# Patient Record
Sex: FEMALE | Race: WHITE
[De-identification: names, ages, dates, MRNs, and addresses within clinical notes are randomized per-mention and may not be internally consistent; named-entity substitution may affect disease eponyms.]

---

## 2017-02-02 ENCOUNTER — RECORD ABSTRACTING (OUTPATIENT)
Age: 32
End: 2017-02-02

## 2017-02-02 DIAGNOSIS — F15.90 OTHER STIMULANT USE, UNSPECIFIED, UNCOMPLICATED: ICD-10-CM

## 2017-02-17 ENCOUNTER — APPOINTMENT (OUTPATIENT)
Dept: ENDOCRINOLOGY | Facility: CLINIC | Age: 32
End: 2017-02-17

## 2017-03-22 ENCOUNTER — APPOINTMENT (OUTPATIENT)
Dept: OTOLARYNGOLOGY | Facility: CLINIC | Age: 32
End: 2017-03-22

## 2017-04-12 ENCOUNTER — APPOINTMENT (OUTPATIENT)
Dept: OTOLARYNGOLOGY | Facility: CLINIC | Age: 32
End: 2017-04-12

## 2017-04-20 ENCOUNTER — APPOINTMENT (OUTPATIENT)
Dept: OBGYN | Facility: CLINIC | Age: 32
End: 2017-04-20

## 2017-04-20 VITALS
HEIGHT: 64 IN | DIASTOLIC BLOOD PRESSURE: 90 MMHG | WEIGHT: 164 LBS | SYSTOLIC BLOOD PRESSURE: 140 MMHG | BODY MASS INDEX: 28 KG/M2

## 2017-04-24 LAB
APPEARANCE UR: CLEAR
BACTERIA UR CULT: NORMAL
BILIRUB UR QL STRIP: NEGATIVE
COLOR UR: YELLOW
GLUCOSE UR STRIP-MCNC: NEGATIVE MG/DL
HGB UR QL STRIP: NEGATIVE
KETONES UR STRIP-MCNC: NEGATIVE MG/DL
NITRITE UR QL STRIP: NEGATIVE
PH UR STRIP: 7.5
PROT UR STRIP-MCNC: NEGATIVE MG/DL
SP GR UR STRIP: 1.02
UROBILINOGEN UR STRIP-MCNC: 0.2 MG/DL
WBC URNS QL MICRO: NEGATIVE

## 2017-04-25 ENCOUNTER — RESULT REVIEW (OUTPATIENT)
Age: 32
End: 2017-04-25

## 2017-04-27 LAB
A VAGINAE DNA VAG NAA+PROBE-LOG#: NOT DETECTED
BV SCORE VAG QL NAA+PROBE: ABNORMAL
C GLABRATA DNA VAG QL NAA+PROBE: NOT DETECTED
C PARAP DNA VAG QL NAA+PROBE: NOT DETECTED
C TRACH RRNA SPEC QL NAA+PROBE: NOT DETECTED
C TROPICLS DNA VAG QL NAA+PROBE: NOT DETECTED
CANDIDA DNA VAG QL NAA+PROBE: NOT DETECTED
G VAGINALIS DNA VAG NAA+PROBE-LOG#: 7.1
LACTOBACILLUS DNA VAG NAA+PROBE-LOG#: 6.2
MEGASPHAERA SP DNA VAG NAA+PROBE-LOG#: NOT DETECTED
N GONORRHOEA RRNA SPEC QL NAA+PROBE: NOT DETECTED
T VAGINALIS RRNA SPEC QL NAA+PROBE: NOT DETECTED

## 2017-04-28 LAB
CHOLEST SERPL-MCNC: 159 MG/DL
HDLC SERPL-MCNC: 51 MG/DL
HDLC SERPL: 3.12
LDLC SERPL DIRECT ASSAY-MCNC: 94 MG/DL
TRIGL SERPL-MCNC: 75 MG/DL
VLDLC SERPL-MCNC: 15 MG/DL

## 2017-05-04 ENCOUNTER — APPOINTMENT (OUTPATIENT)
Dept: OBGYN | Facility: CLINIC | Age: 32
End: 2017-05-04

## 2017-05-04 VITALS
WEIGHT: 164 LBS | SYSTOLIC BLOOD PRESSURE: 134 MMHG | BODY MASS INDEX: 28 KG/M2 | DIASTOLIC BLOOD PRESSURE: 82 MMHG | HEIGHT: 64 IN

## 2017-05-10 ENCOUNTER — APPOINTMENT (OUTPATIENT)
Dept: OTOLARYNGOLOGY | Facility: CLINIC | Age: 32
End: 2017-05-10

## 2017-05-12 ENCOUNTER — OUTPATIENT (OUTPATIENT)
Dept: OUTPATIENT SERVICES | Facility: HOSPITAL | Age: 32
LOS: 1 days | Discharge: HOME | End: 2017-05-12

## 2017-05-12 ENCOUNTER — APPOINTMENT (OUTPATIENT)
Dept: ENDOCRINOLOGY | Facility: CLINIC | Age: 32
End: 2017-05-12

## 2017-05-12 VITALS
WEIGHT: 162 LBS | BODY MASS INDEX: 27.66 KG/M2 | DIASTOLIC BLOOD PRESSURE: 80 MMHG | HEART RATE: 106 BPM | SYSTOLIC BLOOD PRESSURE: 130 MMHG | HEIGHT: 64 IN

## 2017-05-18 ENCOUNTER — APPOINTMENT (OUTPATIENT)
Dept: OBGYN | Facility: CLINIC | Age: 32
End: 2017-05-18

## 2017-05-18 VITALS
BODY MASS INDEX: 28.34 KG/M2 | WEIGHT: 166 LBS | SYSTOLIC BLOOD PRESSURE: 116 MMHG | HEIGHT: 64 IN | DIASTOLIC BLOOD PRESSURE: 80 MMHG

## 2017-05-22 ENCOUNTER — OTHER (OUTPATIENT)
Age: 32
End: 2017-05-22

## 2017-05-26 ENCOUNTER — RECORD ABSTRACTING (OUTPATIENT)
Age: 32
End: 2017-05-26

## 2017-06-07 ENCOUNTER — APPOINTMENT (OUTPATIENT)
Dept: INTERNAL MEDICINE | Facility: CLINIC | Age: 32
End: 2017-06-07

## 2017-06-07 ENCOUNTER — RESULT REVIEW (OUTPATIENT)
Age: 32
End: 2017-06-07

## 2017-06-07 VITALS
WEIGHT: 166 LBS | BODY MASS INDEX: 28.34 KG/M2 | HEART RATE: 112 BPM | SYSTOLIC BLOOD PRESSURE: 131 MMHG | DIASTOLIC BLOOD PRESSURE: 83 MMHG | HEIGHT: 64 IN

## 2017-06-07 RX ORDER — PHENAZOPYRIDINE HCL 95 MG
95 TABLET ORAL 3 TIMES DAILY
Qty: 24 | Refills: 0 | Status: DISCONTINUED | COMMUNITY
Start: 2017-05-04 | End: 2017-06-07

## 2017-06-07 RX ORDER — DULOXETINE HYDROCHLORIDE 60 MG/1
60 CAPSULE, DELAYED RELEASE ORAL
Refills: 0 | Status: DISCONTINUED | COMMUNITY
End: 2017-06-07

## 2017-06-08 LAB
APPEARANCE UR: NORMAL
BACTERIA URNS QL MICRO: ABNORMAL
BILIRUB UR QL STRIP: NEGATIVE
COLOR UR: YELLOW
GLUCOSE UR STRIP-MCNC: 100 MG/DL
HGB UR QL STRIP: NEGATIVE
KETONES UR STRIP-MCNC: NEGATIVE MG/DL
NITRITE UR QL STRIP: NEGATIVE
PH UR STRIP: 6.5
PROT UR STRIP-MCNC: NEGATIVE MG/DL
SP GR UR STRIP: 1.02
URINE COMP/EPITH (NORTH): ABNORMAL
UROBILINOGEN UR STRIP-MCNC: 0.2 MG/DL
WBC URNS QL MICRO: ABNORMAL
WBC URNS QL MICRO: ABNORMAL P/HPF

## 2017-06-12 ENCOUNTER — TRANSCRIPTION ENCOUNTER (OUTPATIENT)
Age: 32
End: 2017-06-12

## 2017-06-12 LAB — BACTERIA UR CULT: NORMAL

## 2017-06-15 ENCOUNTER — APPOINTMENT (OUTPATIENT)
Dept: UROLOGY | Facility: CLINIC | Age: 32
End: 2017-06-15

## 2017-06-15 ENCOUNTER — OUTPATIENT (OUTPATIENT)
Dept: OUTPATIENT SERVICES | Facility: HOSPITAL | Age: 32
LOS: 1 days | Discharge: HOME | End: 2017-06-15

## 2017-06-15 VITALS — SYSTOLIC BLOOD PRESSURE: 130 MMHG | HEART RATE: 129 BPM | OXYGEN SATURATION: 99 % | DIASTOLIC BLOOD PRESSURE: 80 MMHG

## 2017-06-15 DIAGNOSIS — R25.1 TREMOR, UNSPECIFIED: ICD-10-CM

## 2017-06-15 DIAGNOSIS — E13.10 OTHER SPECIFIED DIABETES MELLITUS WITH KETOACIDOSIS WITHOUT COMA: ICD-10-CM

## 2017-06-15 RX ORDER — METRONIDAZOLE 500 MG/1
500 TABLET ORAL TWICE DAILY
Qty: 10 | Refills: 0 | Status: COMPLETED | COMMUNITY
Start: 2017-04-27 | End: 2017-06-15

## 2017-06-19 LAB
APPEARANCE UR: CLEAR
BILIRUB UR QL STRIP: NEGATIVE
COLOR UR: YELLOW
GLUCOSE UR STRIP-MCNC: 100 MG/DL
HGB UR QL STRIP: NEGATIVE
KETONES UR STRIP-MCNC: NEGATIVE MG/DL
NITRITE UR QL STRIP: NEGATIVE
PH UR STRIP: 6.5
PROT UR STRIP-MCNC: NEGATIVE MG/DL
SP GR UR STRIP: 1.01
UROBILINOGEN UR STRIP-MCNC: 0.2 MG/DL
WBC URNS QL MICRO: NEGATIVE

## 2017-06-21 ENCOUNTER — APPOINTMENT (OUTPATIENT)
Dept: INTERNAL MEDICINE | Facility: CLINIC | Age: 32
End: 2017-06-21

## 2017-06-21 ENCOUNTER — OUTPATIENT (OUTPATIENT)
Dept: OUTPATIENT SERVICES | Facility: HOSPITAL | Age: 32
LOS: 1 days | Discharge: HOME | End: 2017-06-21

## 2017-06-21 VITALS
HEART RATE: 121 BPM | HEIGHT: 64 IN | WEIGHT: 166 LBS | BODY MASS INDEX: 28.34 KG/M2 | SYSTOLIC BLOOD PRESSURE: 139 MMHG | DIASTOLIC BLOOD PRESSURE: 89 MMHG

## 2017-06-21 DIAGNOSIS — R39.9 UNSPECIFIED SYMPTOMS AND SIGNS INVOLVING THE GENITOURINARY SYSTEM: ICD-10-CM

## 2017-06-21 DIAGNOSIS — R25.1 TREMOR, UNSPECIFIED: ICD-10-CM

## 2017-06-21 DIAGNOSIS — E13.10 OTHER SPECIFIED DIABETES MELLITUS WITH KETOACIDOSIS WITHOUT COMA: ICD-10-CM

## 2017-06-28 DIAGNOSIS — L70.9 ACNE, UNSPECIFIED: ICD-10-CM

## 2017-06-28 DIAGNOSIS — R10.2 PELVIC AND PERINEAL PAIN: ICD-10-CM

## 2017-06-28 DIAGNOSIS — E10.9 TYPE 1 DIABETES MELLITUS WITHOUT COMPLICATIONS: ICD-10-CM

## 2017-06-29 ENCOUNTER — TRANSCRIPTION ENCOUNTER (OUTPATIENT)
Age: 32
End: 2017-06-29

## 2017-07-11 ENCOUNTER — TRANSCRIPTION ENCOUNTER (OUTPATIENT)
Age: 32
End: 2017-07-11

## 2017-07-24 ENCOUNTER — RX RENEWAL (OUTPATIENT)
Age: 32
End: 2017-07-24

## 2017-07-28 ENCOUNTER — OUTPATIENT (OUTPATIENT)
Dept: OUTPATIENT SERVICES | Facility: HOSPITAL | Age: 32
LOS: 1 days | Discharge: HOME | End: 2017-07-28

## 2017-07-28 DIAGNOSIS — E13.10 OTHER SPECIFIED DIABETES MELLITUS WITH KETOACIDOSIS WITHOUT COMA: ICD-10-CM

## 2017-07-28 DIAGNOSIS — R25.1 TREMOR, UNSPECIFIED: ICD-10-CM

## 2017-07-28 DIAGNOSIS — E11.9 TYPE 2 DIABETES MELLITUS WITHOUT COMPLICATIONS: ICD-10-CM

## 2017-08-03 ENCOUNTER — MED ADMIN CHARGE (OUTPATIENT)
Age: 32
End: 2017-08-03

## 2017-08-03 RX ORDER — LANCETS 28 GAUGE
EACH MISCELLANEOUS
Qty: 300 | Refills: 5 | Status: COMPLETED | COMMUNITY
Start: 2017-08-03

## 2017-08-04 ENCOUNTER — OTHER (OUTPATIENT)
Age: 32
End: 2017-08-04

## 2017-08-09 ENCOUNTER — APPOINTMENT (OUTPATIENT)
Dept: OTOLARYNGOLOGY | Facility: CLINIC | Age: 32
End: 2017-08-09
Payer: MEDICAID

## 2017-08-09 VITALS — WEIGHT: 166 LBS | HEIGHT: 64 IN | BODY MASS INDEX: 28.34 KG/M2

## 2017-08-09 PROCEDURE — 99213 OFFICE O/P EST LOW 20 MIN: CPT | Mod: 25

## 2017-08-09 PROCEDURE — 31575 DIAGNOSTIC LARYNGOSCOPY: CPT

## 2017-08-11 ENCOUNTER — APPOINTMENT (OUTPATIENT)
Dept: ENDOCRINOLOGY | Facility: CLINIC | Age: 32
End: 2017-08-11

## 2017-08-11 ENCOUNTER — OUTPATIENT (OUTPATIENT)
Dept: OUTPATIENT SERVICES | Facility: HOSPITAL | Age: 32
LOS: 1 days | Discharge: HOME | End: 2017-08-11

## 2017-08-11 VITALS — HEART RATE: 100 BPM | SYSTOLIC BLOOD PRESSURE: 136 MMHG | DIASTOLIC BLOOD PRESSURE: 80 MMHG

## 2017-08-11 VITALS — BODY MASS INDEX: 28.51 KG/M2 | HEIGHT: 64 IN | WEIGHT: 167 LBS

## 2017-08-11 DIAGNOSIS — E11.42 TYPE 2 DIABETES MELLITUS WITH DIABETIC POLYNEUROPATHY: ICD-10-CM

## 2017-08-11 DIAGNOSIS — E13.10 OTHER SPECIFIED DIABETES MELLITUS WITH KETOACIDOSIS WITHOUT COMA: ICD-10-CM

## 2017-08-11 DIAGNOSIS — E10.9 TYPE 1 DIABETES MELLITUS WITHOUT COMPLICATIONS: ICD-10-CM

## 2017-08-11 DIAGNOSIS — Z02.9 ENCOUNTER FOR ADMINISTRATIVE EXAMINATIONS, UNSPECIFIED: ICD-10-CM

## 2017-08-11 DIAGNOSIS — R25.1 TREMOR, UNSPECIFIED: ICD-10-CM

## 2017-08-21 ENCOUNTER — EMERGENCY (EMERGENCY)
Facility: HOSPITAL | Age: 32
LOS: 0 days | Discharge: HOME | End: 2017-08-21
Admitting: INTERNAL MEDICINE

## 2017-08-21 DIAGNOSIS — K21.9 GASTRO-ESOPHAGEAL REFLUX DISEASE WITHOUT ESOPHAGITIS: ICD-10-CM

## 2017-08-21 DIAGNOSIS — E10.65 TYPE 1 DIABETES MELLITUS WITH HYPERGLYCEMIA: ICD-10-CM

## 2017-08-21 DIAGNOSIS — J45.909 UNSPECIFIED ASTHMA, UNCOMPLICATED: ICD-10-CM

## 2017-08-21 DIAGNOSIS — Z88.8 ALLERGY STATUS TO OTHER DRUGS, MEDICAMENTS AND BIOLOGICAL SUBSTANCES: ICD-10-CM

## 2017-08-21 DIAGNOSIS — R25.1 TREMOR, UNSPECIFIED: ICD-10-CM

## 2017-08-21 DIAGNOSIS — Z88.7 ALLERGY STATUS TO SERUM AND VACCINE: ICD-10-CM

## 2017-08-21 DIAGNOSIS — Z79.4 LONG TERM (CURRENT) USE OF INSULIN: ICD-10-CM

## 2017-08-21 DIAGNOSIS — Z79.899 OTHER LONG TERM (CURRENT) DRUG THERAPY: ICD-10-CM

## 2017-08-21 DIAGNOSIS — Z88.0 ALLERGY STATUS TO PENICILLIN: ICD-10-CM

## 2017-08-21 DIAGNOSIS — Z88.1 ALLERGY STATUS TO OTHER ANTIBIOTIC AGENTS STATUS: ICD-10-CM

## 2017-08-21 DIAGNOSIS — E13.10 OTHER SPECIFIED DIABETES MELLITUS WITH KETOACIDOSIS WITHOUT COMA: ICD-10-CM

## 2017-08-30 ENCOUNTER — APPOINTMENT (OUTPATIENT)
Dept: PODIATRY | Facility: CLINIC | Age: 32
End: 2017-08-30

## 2017-08-30 ENCOUNTER — EMERGENCY (EMERGENCY)
Facility: HOSPITAL | Age: 32
LOS: 0 days | Discharge: HOME | End: 2017-08-31

## 2017-08-30 ENCOUNTER — TRANSCRIPTION ENCOUNTER (OUTPATIENT)
Age: 32
End: 2017-08-30

## 2017-08-30 DIAGNOSIS — E13.10 OTHER SPECIFIED DIABETES MELLITUS WITH KETOACIDOSIS WITHOUT COMA: ICD-10-CM

## 2017-08-30 DIAGNOSIS — Z88.8 ALLERGY STATUS TO OTHER DRUGS, MEDICAMENTS AND BIOLOGICAL SUBSTANCES: ICD-10-CM

## 2017-08-30 DIAGNOSIS — Z88.7 ALLERGY STATUS TO SERUM AND VACCINE: ICD-10-CM

## 2017-08-30 DIAGNOSIS — Z88.0 ALLERGY STATUS TO PENICILLIN: ICD-10-CM

## 2017-08-30 DIAGNOSIS — J45.909 UNSPECIFIED ASTHMA, UNCOMPLICATED: ICD-10-CM

## 2017-08-30 DIAGNOSIS — K21.9 GASTRO-ESOPHAGEAL REFLUX DISEASE WITHOUT ESOPHAGITIS: ICD-10-CM

## 2017-08-30 DIAGNOSIS — E11.65 TYPE 2 DIABETES MELLITUS WITH HYPERGLYCEMIA: ICD-10-CM

## 2017-08-30 DIAGNOSIS — Z79.4 LONG TERM (CURRENT) USE OF INSULIN: ICD-10-CM

## 2017-08-30 DIAGNOSIS — Z79.899 OTHER LONG TERM (CURRENT) DRUG THERAPY: ICD-10-CM

## 2017-08-30 DIAGNOSIS — R25.1 TREMOR, UNSPECIFIED: ICD-10-CM

## 2017-08-30 DIAGNOSIS — Z88.1 ALLERGY STATUS TO OTHER ANTIBIOTIC AGENTS STATUS: ICD-10-CM

## 2017-09-01 ENCOUNTER — APPOINTMENT (OUTPATIENT)
Dept: ENDOCRINOLOGY | Facility: CLINIC | Age: 32
End: 2017-09-01

## 2017-09-01 ENCOUNTER — OUTPATIENT (OUTPATIENT)
Dept: OUTPATIENT SERVICES | Facility: HOSPITAL | Age: 32
LOS: 1 days | Discharge: HOME | End: 2017-09-01

## 2017-09-01 VITALS
HEIGHT: 64 IN | WEIGHT: 165 LBS | BODY MASS INDEX: 28.17 KG/M2 | SYSTOLIC BLOOD PRESSURE: 130 MMHG | DIASTOLIC BLOOD PRESSURE: 80 MMHG | HEART RATE: 95 BPM

## 2017-09-01 DIAGNOSIS — Z02.9 ENCOUNTER FOR ADMINISTRATIVE EXAMINATIONS, UNSPECIFIED: ICD-10-CM

## 2017-09-01 DIAGNOSIS — R25.1 TREMOR, UNSPECIFIED: ICD-10-CM

## 2017-09-01 DIAGNOSIS — E13.10 OTHER SPECIFIED DIABETES MELLITUS WITH KETOACIDOSIS WITHOUT COMA: ICD-10-CM

## 2017-09-06 ENCOUNTER — APPOINTMENT (OUTPATIENT)
Dept: OTOLARYNGOLOGY | Facility: CLINIC | Age: 32
End: 2017-09-06
Payer: MEDICAID

## 2017-09-06 VITALS — WEIGHT: 165 LBS | BODY MASS INDEX: 28.17 KG/M2 | HEIGHT: 64 IN

## 2017-09-06 PROCEDURE — 99213 OFFICE O/P EST LOW 20 MIN: CPT | Mod: 25

## 2017-09-15 ENCOUNTER — OUTPATIENT (OUTPATIENT)
Dept: OUTPATIENT SERVICES | Facility: HOSPITAL | Age: 32
LOS: 1 days | Discharge: HOME | End: 2017-09-15

## 2017-09-15 ENCOUNTER — OTHER (OUTPATIENT)
Age: 32
End: 2017-09-15

## 2017-09-15 DIAGNOSIS — E13.10 OTHER SPECIFIED DIABETES MELLITUS WITH KETOACIDOSIS WITHOUT COMA: ICD-10-CM

## 2017-09-15 DIAGNOSIS — R25.1 TREMOR, UNSPECIFIED: ICD-10-CM

## 2017-09-19 DIAGNOSIS — E10.65 TYPE 1 DIABETES MELLITUS WITH HYPERGLYCEMIA: ICD-10-CM

## 2017-09-21 ENCOUNTER — OTHER (OUTPATIENT)
Age: 32
End: 2017-09-21

## 2017-10-11 ENCOUNTER — APPOINTMENT (OUTPATIENT)
Dept: OTOLARYNGOLOGY | Facility: CLINIC | Age: 32
End: 2017-10-11
Payer: MEDICAID

## 2017-10-11 VITALS — BODY MASS INDEX: 28.68 KG/M2 | WEIGHT: 168 LBS | HEIGHT: 64 IN

## 2017-10-11 PROCEDURE — 99213 OFFICE O/P EST LOW 20 MIN: CPT | Mod: 25

## 2017-10-11 PROCEDURE — 69210 REMOVE IMPACTED EAR WAX UNI: CPT

## 2017-10-16 ENCOUNTER — APPOINTMENT (OUTPATIENT)
Dept: PODIATRY | Facility: CLINIC | Age: 32
End: 2017-10-16

## 2017-10-16 ENCOUNTER — OUTPATIENT (OUTPATIENT)
Dept: OUTPATIENT SERVICES | Facility: HOSPITAL | Age: 32
LOS: 1 days | Discharge: HOME | End: 2017-10-16

## 2017-10-16 VITALS
WEIGHT: 166 LBS | HEART RATE: 114 BPM | BODY MASS INDEX: 28.34 KG/M2 | SYSTOLIC BLOOD PRESSURE: 125 MMHG | DIASTOLIC BLOOD PRESSURE: 86 MMHG | HEIGHT: 64 IN

## 2017-10-16 DIAGNOSIS — R25.1 TREMOR, UNSPECIFIED: ICD-10-CM

## 2017-10-16 DIAGNOSIS — E13.10 OTHER SPECIFIED DIABETES MELLITUS WITH KETOACIDOSIS WITHOUT COMA: ICD-10-CM

## 2017-10-18 DIAGNOSIS — M79.671 PAIN IN RIGHT FOOT: ICD-10-CM

## 2017-10-18 DIAGNOSIS — L85.3 XEROSIS CUTIS: ICD-10-CM

## 2017-10-18 DIAGNOSIS — E10.9 TYPE 1 DIABETES MELLITUS WITHOUT COMPLICATIONS: ICD-10-CM

## 2017-10-18 DIAGNOSIS — L84 CORNS AND CALLOSITIES: ICD-10-CM

## 2017-10-18 DIAGNOSIS — M79.672 PAIN IN LEFT FOOT: ICD-10-CM

## 2017-11-02 ENCOUNTER — APPOINTMENT (OUTPATIENT)
Dept: OBGYN | Facility: CLINIC | Age: 32
End: 2017-11-02

## 2017-11-06 ENCOUNTER — APPOINTMENT (OUTPATIENT)
Dept: OTOLARYNGOLOGY | Facility: CLINIC | Age: 32
End: 2017-11-06
Payer: MEDICAID

## 2017-11-06 VITALS — WEIGHT: 168 LBS | HEIGHT: 64 IN | BODY MASS INDEX: 28.68 KG/M2

## 2017-11-06 PROCEDURE — 99214 OFFICE O/P EST MOD 30 MIN: CPT | Mod: 25

## 2017-11-06 PROCEDURE — 31575 DIAGNOSTIC LARYNGOSCOPY: CPT

## 2017-11-10 ENCOUNTER — APPOINTMENT (OUTPATIENT)
Dept: ENDOCRINOLOGY | Facility: CLINIC | Age: 32
End: 2017-11-10

## 2017-11-10 ENCOUNTER — OUTPATIENT (OUTPATIENT)
Dept: OUTPATIENT SERVICES | Facility: HOSPITAL | Age: 32
LOS: 1 days | Discharge: HOME | End: 2017-11-10

## 2017-11-10 DIAGNOSIS — R25.1 TREMOR, UNSPECIFIED: ICD-10-CM

## 2017-11-10 DIAGNOSIS — E10.9 TYPE 1 DIABETES MELLITUS WITHOUT COMPLICATIONS: ICD-10-CM

## 2017-11-10 DIAGNOSIS — E13.10 OTHER SPECIFIED DIABETES MELLITUS WITH KETOACIDOSIS WITHOUT COMA: ICD-10-CM

## 2017-11-10 DIAGNOSIS — E11.42 TYPE 2 DIABETES MELLITUS WITH DIABETIC POLYNEUROPATHY: ICD-10-CM

## 2017-11-13 ENCOUNTER — RX RENEWAL (OUTPATIENT)
Age: 32
End: 2017-11-13

## 2017-11-29 ENCOUNTER — APPOINTMENT (OUTPATIENT)
Dept: OTOLARYNGOLOGY | Facility: CLINIC | Age: 32
End: 2017-11-29
Payer: MEDICAID

## 2017-11-29 VITALS — BODY MASS INDEX: 28.68 KG/M2 | WEIGHT: 168 LBS | HEIGHT: 64 IN

## 2017-11-29 PROCEDURE — 31575 DIAGNOSTIC LARYNGOSCOPY: CPT

## 2017-11-29 PROCEDURE — 99214 OFFICE O/P EST MOD 30 MIN: CPT | Mod: 25

## 2017-12-27 ENCOUNTER — APPOINTMENT (OUTPATIENT)
Dept: INTERNAL MEDICINE | Facility: CLINIC | Age: 32
End: 2017-12-27

## 2017-12-27 ENCOUNTER — OUTPATIENT (OUTPATIENT)
Dept: OUTPATIENT SERVICES | Facility: HOSPITAL | Age: 32
LOS: 1 days | Discharge: HOME | End: 2017-12-27

## 2017-12-27 VITALS
HEIGHT: 64 IN | BODY MASS INDEX: 28.17 KG/M2 | HEART RATE: 122 BPM | SYSTOLIC BLOOD PRESSURE: 145 MMHG | WEIGHT: 165 LBS | DIASTOLIC BLOOD PRESSURE: 84 MMHG

## 2017-12-27 DIAGNOSIS — R25.1 TREMOR, UNSPECIFIED: ICD-10-CM

## 2017-12-27 DIAGNOSIS — E13.10 OTHER SPECIFIED DIABETES MELLITUS WITH KETOACIDOSIS WITHOUT COMA: ICD-10-CM

## 2017-12-27 LAB — CYTOLOGY CVX/VAG DOC THIN PREP: NORMAL

## 2017-12-28 DIAGNOSIS — E10.9 TYPE 1 DIABETES MELLITUS WITHOUT COMPLICATIONS: ICD-10-CM

## 2017-12-28 DIAGNOSIS — N39.0 URINARY TRACT INFECTION, SITE NOT SPECIFIED: ICD-10-CM

## 2017-12-28 DIAGNOSIS — R25.1 TREMOR, UNSPECIFIED: ICD-10-CM

## 2017-12-28 DIAGNOSIS — R30.0 DYSURIA: ICD-10-CM

## 2017-12-28 LAB
APPEARANCE UR: CLEAR
BILIRUB UR QL STRIP: NEGATIVE
COLOR UR: YELLOW
GLUCOSE UR STRIP-MCNC: >=1000 MG/DL
HGB UR QL STRIP: NEGATIVE
KETONES UR STRIP-MCNC: NEGATIVE MG/DL
NITRITE UR QL STRIP: NEGATIVE
PH UR STRIP: 6.5
PROT UR STRIP-MCNC: NEGATIVE MG/DL
SP GR UR STRIP: >= 1.03
UROBILINOGEN UR STRIP-MCNC: 0.2 MG/DL
WBC URNS QL MICRO: NEGATIVE

## 2018-01-02 LAB — BACTERIA UR CULT: NORMAL

## 2018-01-12 ENCOUNTER — TRANSCRIPTION ENCOUNTER (OUTPATIENT)
Age: 33
End: 2018-01-12

## 2018-01-22 ENCOUNTER — OUTPATIENT (OUTPATIENT)
Dept: OUTPATIENT SERVICES | Facility: HOSPITAL | Age: 33
LOS: 1 days | Discharge: HOME | End: 2018-01-22

## 2018-01-22 ENCOUNTER — APPOINTMENT (OUTPATIENT)
Dept: PODIATRY | Facility: CLINIC | Age: 33
End: 2018-01-22

## 2018-01-22 VITALS
WEIGHT: 166 LBS | HEIGHT: 64 IN | HEART RATE: 111 BPM | SYSTOLIC BLOOD PRESSURE: 128 MMHG | DIASTOLIC BLOOD PRESSURE: 71 MMHG | BODY MASS INDEX: 28.34 KG/M2

## 2018-01-25 ENCOUNTER — APPOINTMENT (OUTPATIENT)
Dept: PODIATRY | Facility: CLINIC | Age: 33
End: 2018-01-25

## 2018-01-25 ENCOUNTER — OUTPATIENT (OUTPATIENT)
Dept: OUTPATIENT SERVICES | Facility: HOSPITAL | Age: 33
LOS: 1 days | Discharge: HOME | End: 2018-01-25

## 2018-01-25 ENCOUNTER — TRANSCRIPTION ENCOUNTER (OUTPATIENT)
Age: 33
End: 2018-01-25

## 2018-01-25 VITALS
HEIGHT: 64 IN | DIASTOLIC BLOOD PRESSURE: 77 MMHG | HEART RATE: 105 BPM | SYSTOLIC BLOOD PRESSURE: 135 MMHG | BODY MASS INDEX: 28.17 KG/M2 | WEIGHT: 165 LBS

## 2018-01-25 DIAGNOSIS — M79.671 PAIN IN RIGHT FOOT: ICD-10-CM

## 2018-01-25 DIAGNOSIS — M77.9 ENTHESOPATHY, UNSPECIFIED: ICD-10-CM

## 2018-01-26 DIAGNOSIS — M79.671 PAIN IN RIGHT FOOT: ICD-10-CM

## 2018-01-26 DIAGNOSIS — M77.9 ENTHESOPATHY, UNSPECIFIED: ICD-10-CM

## 2018-01-26 DIAGNOSIS — L84 CORNS AND CALLOSITIES: ICD-10-CM

## 2018-01-26 DIAGNOSIS — E11.42 TYPE 2 DIABETES MELLITUS WITH DIABETIC POLYNEUROPATHY: ICD-10-CM

## 2018-01-26 DIAGNOSIS — L85.3 XEROSIS CUTIS: ICD-10-CM

## 2018-01-26 DIAGNOSIS — M79.672 PAIN IN LEFT FOOT: ICD-10-CM

## 2018-01-28 ENCOUNTER — TRANSCRIPTION ENCOUNTER (OUTPATIENT)
Age: 33
End: 2018-01-28

## 2018-01-29 ENCOUNTER — OUTPATIENT (OUTPATIENT)
Dept: OUTPATIENT SERVICES | Facility: HOSPITAL | Age: 33
LOS: 1 days | Discharge: HOME | End: 2018-01-29

## 2018-01-29 DIAGNOSIS — M79.671 PAIN IN RIGHT FOOT: ICD-10-CM

## 2018-01-29 DIAGNOSIS — M77.9 ENTHESOPATHY, UNSPECIFIED: ICD-10-CM

## 2018-02-02 ENCOUNTER — APPOINTMENT (OUTPATIENT)
Dept: ENDOCRINOLOGY | Facility: CLINIC | Age: 33
End: 2018-02-02

## 2018-02-02 ENCOUNTER — OUTPATIENT (OUTPATIENT)
Dept: OUTPATIENT SERVICES | Facility: HOSPITAL | Age: 33
LOS: 1 days | Discharge: HOME | End: 2018-02-02

## 2018-02-02 VITALS
WEIGHT: 165 LBS | SYSTOLIC BLOOD PRESSURE: 122 MMHG | HEIGHT: 64 IN | BODY MASS INDEX: 28.17 KG/M2 | DIASTOLIC BLOOD PRESSURE: 80 MMHG | HEART RATE: 90 BPM

## 2018-02-04 DIAGNOSIS — E13.10 OTHER SPECIFIED DIABETES MELLITUS WITH KETOACIDOSIS WITHOUT COMA: ICD-10-CM

## 2018-02-04 DIAGNOSIS — R25.1 TREMOR, UNSPECIFIED: ICD-10-CM

## 2018-02-05 ENCOUNTER — APPOINTMENT (OUTPATIENT)
Dept: OTOLARYNGOLOGY | Facility: CLINIC | Age: 33
End: 2018-02-05
Payer: MEDICAID

## 2018-02-05 VITALS — BODY MASS INDEX: 28.17 KG/M2 | HEIGHT: 64 IN | WEIGHT: 165 LBS

## 2018-02-05 DIAGNOSIS — J34.0 ABSCESS, FURUNCLE AND CARBUNCLE OF NOSE: ICD-10-CM

## 2018-02-05 PROCEDURE — 31575 DIAGNOSTIC LARYNGOSCOPY: CPT

## 2018-02-05 PROCEDURE — 99214 OFFICE O/P EST MOD 30 MIN: CPT | Mod: 25

## 2018-02-05 PROCEDURE — 92567 TYMPANOMETRY: CPT

## 2018-02-10 ENCOUNTER — RX RENEWAL (OUTPATIENT)
Age: 33
End: 2018-02-10

## 2018-02-12 ENCOUNTER — OUTPATIENT (OUTPATIENT)
Dept: OUTPATIENT SERVICES | Facility: HOSPITAL | Age: 33
LOS: 1 days | Discharge: HOME | End: 2018-02-12

## 2018-02-12 ENCOUNTER — APPOINTMENT (OUTPATIENT)
Dept: PODIATRY | Facility: CLINIC | Age: 33
End: 2018-02-12
Payer: MEDICAID

## 2018-02-12 VITALS
SYSTOLIC BLOOD PRESSURE: 117 MMHG | HEART RATE: 120 BPM | HEIGHT: 64 IN | WEIGHT: 165 LBS | BODY MASS INDEX: 28.17 KG/M2 | DIASTOLIC BLOOD PRESSURE: 79 MMHG

## 2018-02-12 PROCEDURE — 99212 OFFICE O/P EST SF 10 MIN: CPT

## 2018-02-15 ENCOUNTER — RX RENEWAL (OUTPATIENT)
Age: 33
End: 2018-02-15

## 2018-02-16 ENCOUNTER — RX RENEWAL (OUTPATIENT)
Age: 33
End: 2018-02-16

## 2018-02-20 ENCOUNTER — EMERGENCY (EMERGENCY)
Facility: HOSPITAL | Age: 33
LOS: 0 days | Discharge: HOME | End: 2018-02-20
Attending: EMERGENCY MEDICINE

## 2018-02-20 VITALS
WEIGHT: 166.01 LBS | RESPIRATION RATE: 20 BRPM | HEART RATE: 122 BPM | OXYGEN SATURATION: 98 % | SYSTOLIC BLOOD PRESSURE: 143 MMHG | HEIGHT: 64 IN | DIASTOLIC BLOOD PRESSURE: 95 MMHG | TEMPERATURE: 98 F

## 2018-02-20 VITALS
HEART RATE: 104 BPM | TEMPERATURE: 98 F | DIASTOLIC BLOOD PRESSURE: 90 MMHG | OXYGEN SATURATION: 100 % | SYSTOLIC BLOOD PRESSURE: 148 MMHG | RESPIRATION RATE: 18 BRPM

## 2018-02-20 DIAGNOSIS — Z79.4 LONG TERM (CURRENT) USE OF INSULIN: ICD-10-CM

## 2018-02-20 DIAGNOSIS — Z79.1 LONG TERM (CURRENT) USE OF NON-STEROIDAL ANTI-INFLAMMATORIES (NSAID): ICD-10-CM

## 2018-02-20 DIAGNOSIS — R30.0 DYSURIA: ICD-10-CM

## 2018-02-20 DIAGNOSIS — E11.65 TYPE 2 DIABETES MELLITUS WITH HYPERGLYCEMIA: ICD-10-CM

## 2018-02-20 DIAGNOSIS — Z79.2 LONG TERM (CURRENT) USE OF ANTIBIOTICS: ICD-10-CM

## 2018-02-20 DIAGNOSIS — Z88.1 ALLERGY STATUS TO OTHER ANTIBIOTIC AGENTS STATUS: ICD-10-CM

## 2018-02-20 DIAGNOSIS — Z79.899 OTHER LONG TERM (CURRENT) DRUG THERAPY: ICD-10-CM

## 2018-02-20 DIAGNOSIS — Z88.0 ALLERGY STATUS TO PENICILLIN: ICD-10-CM

## 2018-02-20 LAB
ALBUMIN SERPL ELPH-MCNC: 4.2 G/DL — SIGNIFICANT CHANGE UP (ref 3–5.5)
ALP SERPL-CCNC: 80 U/L — SIGNIFICANT CHANGE UP (ref 30–115)
ALT FLD-CCNC: 22 U/L — SIGNIFICANT CHANGE UP (ref 0–41)
ANION GAP SERPL CALC-SCNC: 5 MMOL/L — LOW (ref 7–14)
APPEARANCE UR: CLEAR — SIGNIFICANT CHANGE UP
AST SERPL-CCNC: 63 U/L — HIGH (ref 0–41)
BASOPHILS # BLD AUTO: 0.07 K/UL — SIGNIFICANT CHANGE UP (ref 0–0.2)
BASOPHILS NFR BLD AUTO: 0.9 % — SIGNIFICANT CHANGE UP (ref 0–1)
BILIRUB SERPL-MCNC: 2.1 MG/DL — HIGH (ref 0.2–1.2)
BILIRUB UR-MCNC: NEGATIVE — SIGNIFICANT CHANGE UP
BUN SERPL-MCNC: 11 MG/DL — SIGNIFICANT CHANGE UP (ref 10–20)
CALCIUM SERPL-MCNC: 9.2 MG/DL — SIGNIFICANT CHANGE UP (ref 8.5–10.1)
CHLORIDE SERPL-SCNC: 101 MMOL/L — SIGNIFICANT CHANGE UP (ref 98–110)
CO2 SERPL-SCNC: 26 MMOL/L — SIGNIFICANT CHANGE UP (ref 17–32)
COLOR SPEC: YELLOW — SIGNIFICANT CHANGE UP
CREAT SERPL-MCNC: 1.1 MG/DL — SIGNIFICANT CHANGE UP (ref 0.7–1.5)
DIFF PNL FLD: NEGATIVE — SIGNIFICANT CHANGE UP
EOSINOPHIL # BLD AUTO: 0.14 K/UL — SIGNIFICANT CHANGE UP (ref 0–0.7)
EOSINOPHIL NFR BLD AUTO: 1.8 % — SIGNIFICANT CHANGE UP (ref 0–8)
GLUCOSE SERPL-MCNC: 234 MG/DL — HIGH (ref 70–110)
GLUCOSE UR QL: 500 MG/DL
HCT VFR BLD CALC: 40.8 % — SIGNIFICANT CHANGE UP (ref 37–47)
HGB BLD-MCNC: 14.2 G/DL — SIGNIFICANT CHANGE UP (ref 14–18)
IMM GRANULOCYTES NFR BLD AUTO: 0.3 % — SIGNIFICANT CHANGE UP (ref 0.1–0.3)
KETONES UR-MCNC: NEGATIVE — SIGNIFICANT CHANGE UP
LEUKOCYTE ESTERASE UR-ACNC: NEGATIVE — SIGNIFICANT CHANGE UP
LYMPHOCYTES # BLD AUTO: 2.26 K/UL — SIGNIFICANT CHANGE UP (ref 1.2–3.4)
LYMPHOCYTES # BLD AUTO: 29.2 % — SIGNIFICANT CHANGE UP (ref 20.5–51.1)
MCHC RBC-ENTMCNC: 30.9 PG — SIGNIFICANT CHANGE UP (ref 27–31)
MCHC RBC-ENTMCNC: 34.8 G/DL — SIGNIFICANT CHANGE UP (ref 32–37)
MCV RBC AUTO: 88.9 FL — SIGNIFICANT CHANGE UP (ref 81–91)
MONOCYTES # BLD AUTO: 0.47 K/UL — SIGNIFICANT CHANGE UP (ref 0.1–0.6)
MONOCYTES NFR BLD AUTO: 6.1 % — SIGNIFICANT CHANGE UP (ref 1.7–9.3)
NEUTROPHILS # BLD AUTO: 4.79 K/UL — SIGNIFICANT CHANGE UP (ref 1.4–6.5)
NEUTROPHILS NFR BLD AUTO: 61.7 % — SIGNIFICANT CHANGE UP (ref 42.2–75.2)
NITRITE UR-MCNC: NEGATIVE — SIGNIFICANT CHANGE UP
NRBC # BLD: 0 /100 WBCS — SIGNIFICANT CHANGE UP (ref 0–0)
PH UR: 7 — SIGNIFICANT CHANGE UP (ref 5–8)
PLATELET # BLD AUTO: 233 K/UL — SIGNIFICANT CHANGE UP (ref 130–400)
POTASSIUM SERPL-MCNC: 6.1 MMOL/L — CRITICAL HIGH (ref 3.5–5)
POTASSIUM SERPL-SCNC: 6.1 MMOL/L — CRITICAL HIGH (ref 3.5–5)
PROT SERPL-MCNC: 7.2 G/DL — SIGNIFICANT CHANGE UP (ref 6–8)
PROT UR-MCNC: NEGATIVE MG/DL — SIGNIFICANT CHANGE UP
RBC # BLD: 4.59 M/UL — SIGNIFICANT CHANGE UP (ref 4.2–5.4)
RBC # FLD: 12.3 % — SIGNIFICANT CHANGE UP (ref 11.5–14.5)
SODIUM SERPL-SCNC: 132 MMOL/L — LOW (ref 135–146)
SP GR SPEC: 1.02 — SIGNIFICANT CHANGE UP (ref 1.01–1.03)
UROBILINOGEN FLD QL: 0.2 MG/DL — SIGNIFICANT CHANGE UP (ref 0.2–0.2)
WBC # BLD: 7.75 K/UL — SIGNIFICANT CHANGE UP (ref 4.8–10.8)
WBC # FLD AUTO: 7.75 K/UL — SIGNIFICANT CHANGE UP (ref 4.8–10.8)

## 2018-02-20 RX ORDER — SODIUM CHLORIDE 9 MG/ML
1000 INJECTION INTRAMUSCULAR; INTRAVENOUS; SUBCUTANEOUS ONCE
Qty: 0 | Refills: 0 | Status: DISCONTINUED | OUTPATIENT
Start: 2018-02-20 | End: 2018-02-20

## 2018-02-20 RX ORDER — NITROFURANTOIN MACROCRYSTAL 50 MG
1 CAPSULE ORAL
Qty: 14 | Refills: 0 | OUTPATIENT
Start: 2018-02-20 | End: 2018-02-26

## 2018-02-20 NOTE — ED PROVIDER NOTE - PHYSICAL EXAMINATION
Vital Signs: I have reviewed the initial vital signs.  Constitutional: well-nourished, appears stated age, no acute distress  Cardiovascular: regular rate, regular rhythm, well-perfused extremities  Respiratory: unlabored respiratory effort, clear to auscultation bilaterally  Gastrointestinal: soft, non-tender abdomen, no pulsatile mass  Musculoskeletal: no CVA tenderness, supple neck, no lower extremity edema  Integumentary: warm, dry, no rash  Neurologic: awake, alert, cranial nerves II-XII grossly intact, extremities’ motor and sensory functions grossly intact  Psychiatric: appropriate mood, appropriate affect

## 2018-02-20 NOTE — ED PROVIDER NOTE - NS ED ROS FT
Review of Systems    Constitutional: (-) fever  Eyes/ENT: (-) blurry vision, (-) epistaxis  Cardiovascular: (-) chest pain, (-) syncope  Respiratory: (-) cough, (-) shortness of breath  Gastrointestinal: (+) vomiting, (-) diarrhea  Musculoskeletal: (-) neck pain, (-) back pain, (-) joint pain  Integumentary: (-) rash, (-) edema  Neurological: (-) headache, (-) altered mental status  Psychiatric: (-) hallucinations  Allergic/Immunologic: (-) pruritus

## 2018-02-20 NOTE — ED PROVIDER NOTE - OBJECTIVE STATEMENT
33 y/o insulin dependent diabetic c/o 2 months of dysuria. patient with multiple urine cultures presents with dysuria . patient started on bactrim yesterday. patient with nausea and vomited x 1 today. patient with elevated glucose readings today. patient denies any fever,chills. not sexually active at this time. patient with mucoid vaginal discharge.

## 2018-02-20 NOTE — ED PROVIDER NOTE - ATTENDING CONTRIBUTION TO CARE
32 year old female, pmhx DM and neuropathy, comes in with complaint of urinary symptoms, + frequency, + Dysuria, has been ongoing for 2 weeks, no cp/sob, no n/v/d, no loc, no fever, patient started on bactrim yesterday, states that it is upsetting her stomach and wants it changed, patient had macrobid in the past and it worked well and wants it again. Patient accompanied by mother.     Exam: Well appearing, no acute distress, AAO x 3, sitting up and providing history, EOMI, PERRL 3mm bilateral, no nystagmus, HEENT Unremarkable, + moist mucous membranes, no pooling of secretions, no jvd, + full passive rom in neck, negative Kernig, negative Brudzinski, s1s2, no mrg, rrr, + symmetric bilateral pulses, ctabl, no wrr, good air movement overall, no pulsatile abdominal mass, abd soft, nt nd, no rebound, no guarding, no signs of peritonitis, no cva tenderness, no rash, no leg edema, dp and pt pulses intact. No calf pain, swelling or erythema, Ambulatory. Strength intact symmetrically. CN 2-12 grossly intact, GCS 15. P: labs, tx, reassess

## 2018-02-21 ENCOUNTER — APPOINTMENT (OUTPATIENT)
Dept: OTOLARYNGOLOGY | Facility: CLINIC | Age: 33
End: 2018-02-21

## 2018-02-21 LAB
CULTURE RESULTS: NO GROWTH — SIGNIFICANT CHANGE UP
CULTURE RESULTS: SIGNIFICANT CHANGE UP
SPECIMEN SOURCE: SIGNIFICANT CHANGE UP

## 2018-02-26 ENCOUNTER — APPOINTMENT (OUTPATIENT)
Dept: PODIATRY | Facility: CLINIC | Age: 33
End: 2018-02-26

## 2018-02-26 ENCOUNTER — EMERGENCY (EMERGENCY)
Facility: HOSPITAL | Age: 33
LOS: 0 days | Discharge: HOME | End: 2018-02-26
Attending: EMERGENCY MEDICINE

## 2018-02-26 VITALS
TEMPERATURE: 98 F | WEIGHT: 160.06 LBS | SYSTOLIC BLOOD PRESSURE: 168 MMHG | HEIGHT: 64 IN | OXYGEN SATURATION: 98 % | HEART RATE: 123 BPM | RESPIRATION RATE: 20 BRPM | DIASTOLIC BLOOD PRESSURE: 103 MMHG

## 2018-02-26 VITALS — HEART RATE: 103 BPM

## 2018-02-26 DIAGNOSIS — Z88.8 ALLERGY STATUS TO OTHER DRUGS, MEDICAMENTS AND BIOLOGICAL SUBSTANCES: ICD-10-CM

## 2018-02-26 DIAGNOSIS — Z88.1 ALLERGY STATUS TO OTHER ANTIBIOTIC AGENTS STATUS: ICD-10-CM

## 2018-02-26 DIAGNOSIS — Z79.4 LONG TERM (CURRENT) USE OF INSULIN: ICD-10-CM

## 2018-02-26 DIAGNOSIS — K21.9 GASTRO-ESOPHAGEAL REFLUX DISEASE WITHOUT ESOPHAGITIS: ICD-10-CM

## 2018-02-26 DIAGNOSIS — E11.9 TYPE 2 DIABETES MELLITUS WITHOUT COMPLICATIONS: ICD-10-CM

## 2018-02-26 DIAGNOSIS — R10.84 GENERALIZED ABDOMINAL PAIN: ICD-10-CM

## 2018-02-26 DIAGNOSIS — M54.9 DORSALGIA, UNSPECIFIED: ICD-10-CM

## 2018-02-26 DIAGNOSIS — Z79.899 OTHER LONG TERM (CURRENT) DRUG THERAPY: ICD-10-CM

## 2018-02-26 DIAGNOSIS — Z88.0 ALLERGY STATUS TO PENICILLIN: ICD-10-CM

## 2018-02-26 LAB
ALBUMIN SERPL ELPH-MCNC: 4.5 G/DL — SIGNIFICANT CHANGE UP (ref 3–5.5)
ALP SERPL-CCNC: 78 U/L — SIGNIFICANT CHANGE UP (ref 30–115)
ALT FLD-CCNC: 23 U/L — SIGNIFICANT CHANGE UP (ref 0–41)
ANION GAP SERPL CALC-SCNC: 6 MMOL/L — LOW (ref 7–14)
APPEARANCE UR: CLEAR — SIGNIFICANT CHANGE UP
AST SERPL-CCNC: 35 U/L — SIGNIFICANT CHANGE UP (ref 0–41)
BASOPHILS # BLD AUTO: 0.05 K/UL — SIGNIFICANT CHANGE UP (ref 0–0.2)
BASOPHILS NFR BLD AUTO: 0.6 % — SIGNIFICANT CHANGE UP (ref 0–1)
BILIRUB SERPL-MCNC: 0.9 MG/DL — SIGNIFICANT CHANGE UP (ref 0.2–1.2)
BILIRUB UR-MCNC: NEGATIVE — SIGNIFICANT CHANGE UP
BUN SERPL-MCNC: 11 MG/DL — SIGNIFICANT CHANGE UP (ref 10–20)
CALCIUM SERPL-MCNC: 9.7 MG/DL — SIGNIFICANT CHANGE UP (ref 8.5–10.1)
CHLORIDE SERPL-SCNC: 102 MMOL/L — SIGNIFICANT CHANGE UP (ref 98–110)
CO2 SERPL-SCNC: 27 MMOL/L — SIGNIFICANT CHANGE UP (ref 17–32)
COLOR SPEC: YELLOW — SIGNIFICANT CHANGE UP
CREAT SERPL-MCNC: 0.6 MG/DL — LOW (ref 0.7–1.5)
DIFF PNL FLD: NEGATIVE — SIGNIFICANT CHANGE UP
EOSINOPHIL # BLD AUTO: 0.16 K/UL — SIGNIFICANT CHANGE UP (ref 0–0.7)
EOSINOPHIL NFR BLD AUTO: 2 % — SIGNIFICANT CHANGE UP (ref 0–8)
GLUCOSE SERPL-MCNC: 90 MG/DL — SIGNIFICANT CHANGE UP (ref 70–110)
GLUCOSE UR QL: NEGATIVE MG/DL — SIGNIFICANT CHANGE UP
HCT VFR BLD CALC: 42.4 % — SIGNIFICANT CHANGE UP (ref 37–47)
HGB BLD-MCNC: 14.4 G/DL — SIGNIFICANT CHANGE UP (ref 14–18)
IMM GRANULOCYTES NFR BLD AUTO: 0.4 % — HIGH (ref 0.1–0.3)
KETONES UR-MCNC: NEGATIVE — SIGNIFICANT CHANGE UP
LACTATE SERPL-SCNC: 1 MMOL/L — SIGNIFICANT CHANGE UP (ref 0.5–2.2)
LEUKOCYTE ESTERASE UR-ACNC: NEGATIVE — SIGNIFICANT CHANGE UP
LIDOCAIN IGE QN: 11 U/L — SIGNIFICANT CHANGE UP (ref 7–60)
LYMPHOCYTES # BLD AUTO: 2.8 K/UL — SIGNIFICANT CHANGE UP (ref 1.2–3.4)
LYMPHOCYTES # BLD AUTO: 34.4 % — SIGNIFICANT CHANGE UP (ref 20.5–51.1)
MCHC RBC-ENTMCNC: 30.3 PG — SIGNIFICANT CHANGE UP (ref 27–31)
MCHC RBC-ENTMCNC: 34 G/DL — SIGNIFICANT CHANGE UP (ref 32–37)
MCV RBC AUTO: 89.1 FL — SIGNIFICANT CHANGE UP (ref 81–91)
MONOCYTES # BLD AUTO: 0.6 K/UL — SIGNIFICANT CHANGE UP (ref 0.1–0.6)
MONOCYTES NFR BLD AUTO: 7.4 % — SIGNIFICANT CHANGE UP (ref 1.7–9.3)
NEUTROPHILS # BLD AUTO: 4.49 K/UL — SIGNIFICANT CHANGE UP (ref 1.4–6.5)
NEUTROPHILS NFR BLD AUTO: 55.2 % — SIGNIFICANT CHANGE UP (ref 42.2–75.2)
NITRITE UR-MCNC: NEGATIVE — SIGNIFICANT CHANGE UP
NRBC # BLD: 0 /100 WBCS — SIGNIFICANT CHANGE UP (ref 0–0)
PH UR: 7.5 — SIGNIFICANT CHANGE UP (ref 5–8)
PLATELET # BLD AUTO: 363 K/UL — SIGNIFICANT CHANGE UP (ref 130–400)
POTASSIUM SERPL-MCNC: 5.1 MMOL/L — HIGH (ref 3.5–5)
POTASSIUM SERPL-SCNC: 5.1 MMOL/L — HIGH (ref 3.5–5)
PROT SERPL-MCNC: 8.2 G/DL — HIGH (ref 6–8)
PROT UR-MCNC: NEGATIVE MG/DL — SIGNIFICANT CHANGE UP
RBC # BLD: 4.76 M/UL — SIGNIFICANT CHANGE UP (ref 4.2–5.4)
RBC # FLD: 12 % — SIGNIFICANT CHANGE UP (ref 11.5–14.5)
SODIUM SERPL-SCNC: 135 MMOL/L — SIGNIFICANT CHANGE UP (ref 135–146)
SP GR SPEC: 1.02 — SIGNIFICANT CHANGE UP (ref 1.01–1.03)
UROBILINOGEN FLD QL: 0.2 MG/DL — SIGNIFICANT CHANGE UP (ref 0.2–0.2)
WBC # BLD: 8.13 K/UL — SIGNIFICANT CHANGE UP (ref 4.8–10.8)
WBC # FLD AUTO: 8.13 K/UL — SIGNIFICANT CHANGE UP (ref 4.8–10.8)

## 2018-02-26 RX ORDER — SODIUM CHLORIDE 9 MG/ML
3 INJECTION INTRAMUSCULAR; INTRAVENOUS; SUBCUTANEOUS ONCE
Qty: 0 | Refills: 0 | Status: COMPLETED | OUTPATIENT
Start: 2018-02-26 | End: 2018-02-26

## 2018-02-26 RX ORDER — FAMOTIDINE 10 MG/ML
20 INJECTION INTRAVENOUS ONCE
Qty: 0 | Refills: 0 | Status: COMPLETED | OUTPATIENT
Start: 2018-02-26 | End: 2018-02-26

## 2018-02-26 RX ADMIN — SODIUM CHLORIDE 3 MILLILITER(S): 9 INJECTION INTRAMUSCULAR; INTRAVENOUS; SUBCUTANEOUS at 16:47

## 2018-02-26 RX ADMIN — FAMOTIDINE 20 MILLIGRAM(S): 10 INJECTION INTRAVENOUS at 16:47

## 2018-02-26 NOTE — ED PROVIDER NOTE - GASTROINTESTINAL, MLM
soft, diffusely tender. negative CVA tenderss, negative Mcburney, negative murphys. soft, diffusely tender. negative CVA tenderness, negative Mcburney, negative murphys.

## 2018-02-26 NOTE — ED PROVIDER NOTE - PROGRESS NOTE DETAILS
pt checked her blood sugar- 83...will continue to monitor. currently NAD supervising care of this patient Dr. Tanner printed results and discussed with patient. pt expressed verbal understanding of the results and agrees to follow up at rehab clinic for back pain.

## 2018-02-26 NOTE — ED PROVIDER NOTE - ATTENDING CONTRIBUTION TO CARE
I discussed with patient treatment, need to follow-up as well as reasons to return and they agree.  Copies of results were reviewed with and given to patient to bring to f/u.

## 2018-02-26 NOTE — ED PROVIDER NOTE - OBJECTIVE STATEMENT
31y/o F, h/o DM type I-has insulin pump, presents with diffuse abdominal, chest, and back pain ongoing for 3 weeks. Pt was seen initially by her OBGYn and eve 33y/o F, h/o DM type I-has insulin pump, presents with diffuse abdominal, chest, and back pain ongoing for 3 weeks. Pt was seen initially by her OBGYN about 3 weeks ago for dysuria and rx'd bactrim for a UTI. pt was seen here last week for no change in pain and increased abd discomfort from the bactrim. Pt is currently on her 6th day of Macrobid.   Pt states she is not sexually active. Denies new vaginal discharge, vaginal bleeding, rash, fever, n/v/d. 31y/o F, h/o DM type I-has insulin pump, GERD, vaginal neuropathy, presents with diffuse abdominal, chest, and back pain ongoing for 3 weeks. Pt was seen initially by her OBGYN about 3 weeks ago for dysuria and generalized abd pain and rx'd bactrim for a UTI. pt was seen here last week for no change in pain and increased abd discomfort from the bactrim. Pt is currently on her 6th day of Macrobid and is still experiencing dysuria, and generalized abd pain. Pt states she is not sexually active. Denies new vaginal discharge, vaginal bleeding, rash, fever, n/v/d, incontinence, saddle anesthesia,

## 2018-02-28 ENCOUNTER — OTHER (OUTPATIENT)
Age: 33
End: 2018-02-28

## 2018-02-28 LAB
CULTURE RESULTS: SIGNIFICANT CHANGE UP
SPECIMEN SOURCE: SIGNIFICANT CHANGE UP

## 2018-03-05 ENCOUNTER — OUTPATIENT (OUTPATIENT)
Dept: OUTPATIENT SERVICES | Facility: HOSPITAL | Age: 33
LOS: 1 days | Discharge: HOME | End: 2018-03-05

## 2018-03-05 ENCOUNTER — OTHER (OUTPATIENT)
Age: 33
End: 2018-03-05

## 2018-03-05 DIAGNOSIS — M54.5 LOW BACK PAIN: ICD-10-CM

## 2018-03-09 ENCOUNTER — EMERGENCY (EMERGENCY)
Facility: HOSPITAL | Age: 33
LOS: 0 days | Discharge: HOME | End: 2018-03-10
Attending: EMERGENCY MEDICINE

## 2018-03-09 VITALS
OXYGEN SATURATION: 100 % | TEMPERATURE: 97 F | RESPIRATION RATE: 17 BRPM | HEART RATE: 125 BPM | SYSTOLIC BLOOD PRESSURE: 154 MMHG | DIASTOLIC BLOOD PRESSURE: 93 MMHG

## 2018-03-09 DIAGNOSIS — Z88.0 ALLERGY STATUS TO PENICILLIN: ICD-10-CM

## 2018-03-09 DIAGNOSIS — Z79.2 LONG TERM (CURRENT) USE OF ANTIBIOTICS: ICD-10-CM

## 2018-03-09 DIAGNOSIS — Z88.8 ALLERGY STATUS TO OTHER DRUGS, MEDICAMENTS AND BIOLOGICAL SUBSTANCES STATUS: ICD-10-CM

## 2018-03-09 DIAGNOSIS — R07.89 OTHER CHEST PAIN: ICD-10-CM

## 2018-03-09 DIAGNOSIS — E10.9 TYPE 1 DIABETES MELLITUS WITHOUT COMPLICATIONS: ICD-10-CM

## 2018-03-09 DIAGNOSIS — R42 DIZZINESS AND GIDDINESS: ICD-10-CM

## 2018-03-09 DIAGNOSIS — R00.0 TACHYCARDIA, UNSPECIFIED: ICD-10-CM

## 2018-03-09 DIAGNOSIS — Z79.891 LONG TERM (CURRENT) USE OF OPIATE ANALGESIC: ICD-10-CM

## 2018-03-09 DIAGNOSIS — R07.81 PLEURODYNIA: ICD-10-CM

## 2018-03-09 DIAGNOSIS — Z79.4 LONG TERM (CURRENT) USE OF INSULIN: ICD-10-CM

## 2018-03-09 DIAGNOSIS — Z79.899 OTHER LONG TERM (CURRENT) DRUG THERAPY: ICD-10-CM

## 2018-03-09 DIAGNOSIS — Z88.1 ALLERGY STATUS TO OTHER ANTIBIOTIC AGENTS STATUS: ICD-10-CM

## 2018-03-09 DIAGNOSIS — R06.02 SHORTNESS OF BREATH: ICD-10-CM

## 2018-03-09 LAB
ALBUMIN SERPL ELPH-MCNC: 4.4 G/DL — SIGNIFICANT CHANGE UP (ref 3–5.5)
ALP SERPL-CCNC: 68 U/L — SIGNIFICANT CHANGE UP (ref 30–115)
ALT FLD-CCNC: 22 U/L — SIGNIFICANT CHANGE UP (ref 0–41)
ANION GAP SERPL CALC-SCNC: 13 MMOL/L — SIGNIFICANT CHANGE UP (ref 7–14)
AST SERPL-CCNC: 23 U/L — SIGNIFICANT CHANGE UP (ref 0–41)
BASOPHILS # BLD AUTO: 0.04 K/UL — SIGNIFICANT CHANGE UP (ref 0–0.2)
BASOPHILS NFR BLD AUTO: 0.4 % — SIGNIFICANT CHANGE UP (ref 0–1)
BILIRUB SERPL-MCNC: 0.6 MG/DL — SIGNIFICANT CHANGE UP (ref 0.2–1.2)
BUN SERPL-MCNC: 10 MG/DL — SIGNIFICANT CHANGE UP (ref 10–20)
CALCIUM SERPL-MCNC: 9.6 MG/DL — SIGNIFICANT CHANGE UP (ref 8.5–10.1)
CHLORIDE SERPL-SCNC: 105 MMOL/L — SIGNIFICANT CHANGE UP (ref 98–110)
CK MB CFR SERPL CALC: 0.8 NG/ML — SIGNIFICANT CHANGE UP (ref 0.6–6.3)
CK SERPL-CCNC: 55 U/L — SIGNIFICANT CHANGE UP (ref 0–225)
CO2 SERPL-SCNC: 20 MMOL/L — SIGNIFICANT CHANGE UP (ref 17–32)
CREAT SERPL-MCNC: 0.6 MG/DL — LOW (ref 0.7–1.5)
D DIMER BLD IA.RAPID-MCNC: 114 NG/ML DDU — SIGNIFICANT CHANGE UP (ref 0–230)
EOSINOPHIL # BLD AUTO: 0.24 K/UL — SIGNIFICANT CHANGE UP (ref 0–0.7)
EOSINOPHIL NFR BLD AUTO: 2.4 % — SIGNIFICANT CHANGE UP (ref 0–8)
GLUCOSE SERPL-MCNC: 70 MG/DL — SIGNIFICANT CHANGE UP (ref 70–110)
HCT VFR BLD CALC: 40.9 % — SIGNIFICANT CHANGE UP (ref 37–47)
HGB BLD-MCNC: 13.9 G/DL — SIGNIFICANT CHANGE UP (ref 12–16)
IMM GRANULOCYTES NFR BLD AUTO: 0.5 % — HIGH (ref 0.1–0.3)
LYMPHOCYTES # BLD AUTO: 3 K/UL — SIGNIFICANT CHANGE UP (ref 1.2–3.4)
LYMPHOCYTES # BLD AUTO: 30.5 % — SIGNIFICANT CHANGE UP (ref 20.5–51.1)
MCHC RBC-ENTMCNC: 30.4 PG — SIGNIFICANT CHANGE UP (ref 27–31)
MCHC RBC-ENTMCNC: 34 G/DL — SIGNIFICANT CHANGE UP (ref 32–37)
MCV RBC AUTO: 89.5 FL — SIGNIFICANT CHANGE UP (ref 81–99)
MONOCYTES # BLD AUTO: 0.77 K/UL — HIGH (ref 0.1–0.6)
MONOCYTES NFR BLD AUTO: 7.8 % — SIGNIFICANT CHANGE UP (ref 1.7–9.3)
NEUTROPHILS # BLD AUTO: 5.74 K/UL — SIGNIFICANT CHANGE UP (ref 1.4–6.5)
NEUTROPHILS NFR BLD AUTO: 58.4 % — SIGNIFICANT CHANGE UP (ref 42.2–75.2)
NRBC # BLD: 0 /100 WBCS — SIGNIFICANT CHANGE UP (ref 0–0)
PLATELET # BLD AUTO: 365 K/UL — SIGNIFICANT CHANGE UP (ref 130–400)
POTASSIUM SERPL-MCNC: 3.6 MMOL/L — SIGNIFICANT CHANGE UP (ref 3.5–5)
POTASSIUM SERPL-SCNC: 3.6 MMOL/L — SIGNIFICANT CHANGE UP (ref 3.5–5)
PROT SERPL-MCNC: 7.9 G/DL — SIGNIFICANT CHANGE UP (ref 6–8)
RBC # BLD: 4.57 M/UL — SIGNIFICANT CHANGE UP (ref 4.2–5.4)
RBC # FLD: 12.5 % — SIGNIFICANT CHANGE UP (ref 11.5–14.5)
SODIUM SERPL-SCNC: 138 MMOL/L — SIGNIFICANT CHANGE UP (ref 135–146)
TROPONIN I SERPL-MCNC: <0.02 NG/ML — SIGNIFICANT CHANGE UP (ref 0–0.05)
WBC # BLD: 9.84 K/UL — SIGNIFICANT CHANGE UP (ref 4.8–10.8)
WBC # FLD AUTO: 9.84 K/UL — SIGNIFICANT CHANGE UP (ref 4.8–10.8)

## 2018-03-09 RX ORDER — SODIUM CHLORIDE 9 MG/ML
3 INJECTION INTRAMUSCULAR; INTRAVENOUS; SUBCUTANEOUS ONCE
Qty: 0 | Refills: 0 | Status: COMPLETED | OUTPATIENT
Start: 2018-03-09 | End: 2018-03-09

## 2018-03-09 RX ADMIN — SODIUM CHLORIDE 3 MILLILITER(S): 9 INJECTION INTRAMUSCULAR; INTRAVENOUS; SUBCUTANEOUS at 21:53

## 2018-03-09 NOTE — ED ADULT NURSE NOTE - OBJECTIVE STATEMENT
pt c/o epigastric and mid sternal chest pain intermittently x 3 weeks. pt also reports palpitations, however states she is tachycardic and has palpitations at baseline.

## 2018-03-09 NOTE — ED PROVIDER NOTE - PHYSICAL EXAMINATION
CONST: Well appearing in NAD  EYES: Sclera and conjunctiva clear.   CARD: Normal S1 S2; Normal rate and rhythm. + sternal chest wall tenderness  RESP: Equal BS B/L, No wheezes, rhonchi or rales. No distress  GI: Soft, non-tender, non-distended.  NEURO: A&Ox3, EOM intact. Cerebellar intact.

## 2018-03-09 NOTE — ED PROVIDER NOTE - PMH
Diabetes mellitus, type 2    Neuropathy    Urinary tract infection Neuropathy    Tachycardia    Type 1 diabetes    Urinary tract infection

## 2018-03-09 NOTE — ED PROVIDER NOTE - CARE PLAN
Principal Discharge DX:	Atypical chest pain Principal Discharge DX:	Atypical chest pain  Secondary Diagnosis:	Tachycardia

## 2018-03-09 NOTE — ED PROVIDER NOTE - OBJECTIVE STATEMENT
32 year old female with a PMHx of DM type 1, neuropathy c/o intermittent sharp sternal chest pain x 3 weeks. The chest pain is not exertional, positional, pleuritic. It lasts for a few hours at a time. She admits to SOB yesterday and dizziness. Pt sts she feels unsteady when she walks. She denies smoking hx, recent travel, calf pain/swelling, n/v, syncope, recent URI, abdominal pain. 32 year old female with a PMHx of DM type 1, neuropathy c/o intermittent sharp sternal chest pain x 3 weeks. The chest pain is not exertional, positional, pleuritic. It lasts for a few hours at a time. She admits to SOB yesterday and dizziness. Pt sts she feels unsteady when she walks. She denies smoking hx, recent travel, calf pain/swelling, n/v, syncope, recent URI, abdominal pain. Pt was sent in from her neurologist Dr. Engle for the chest pain.

## 2018-03-09 NOTE — ED ADULT NURSE NOTE - PMH
Diabetes mellitus, type 2    Neuropathy    Urinary tract infection Neuropathy    Type 1 diabetes    Urinary tract infection

## 2018-03-09 NOTE — ED PROVIDER NOTE - NS ED ROS FT
CONST: No fever, chills or bodyaches  EYES: No visual changes  CARD: + midsernal chest pain, b/l rib pain.   RESP: + SOB. No cough, hemoptysis.   GI: No abdominal pain, N/V/D  NEURO: + dizziness. No headache, paresthesias or LOC

## 2018-03-09 NOTE — ED PROVIDER NOTE - ATTENDING CONTRIBUTION TO CARE
31 y/o F here for 3 weeks of cp. Pt has been following with Dr. Tabares for a gait abnl and had a recent lumbar MRI that was normal.  Pt with h/o tachycardia, and is followed by Dr. Kennedy her PCP. 31 y/o F here for 3 weeks of cp. Pt has been following with Dr. Tabares for a gait abnl and had a recent lumbar MRI that was normal.  Pt with h/o tachycardia, and is followed by Dr. Kennedy her PCP.  pt states the pain is b/l under her breasts and intermittent. No n/v/d. no new back pain. no cough.  no new leg pain or swelling.  No ap.  EXAM: well appearing. NAD. s1s2, reg. CTAB. abd soft, nd, nt.  Pt with slow gait and will not lift or bed knees while walking.  However, Pt when getting onto the stretcher and then turning and laying back in the stretcher will bend and use knees.  P: labs, ekg, cxr 31 y/o F here for 3 weeks of cp. Pt has been following with Dr. Tabares for a gait abnl and had a recent lumbar MRI that was normal.  Pt with h/o tachycardia, and is followed by Dr. Kennedy her PCP.  pt states the pain is b/l under her breasts and intermittent. No n/v/d. no new back pain. no cough.  no new leg pain or swelling.  No ap.  EXAM: well appearing. NAD. s1s2, reg. CTAB. abd soft, nd, nt.  kenna on palp of chest wall. Pt with slow gait and will not lift or bed knees while walking.  However, Pt when getting onto the stretcher and then turning and laying back in the stretcher will bend and use knees.  P: labs, ekg, cxr

## 2018-03-09 NOTE — ED PROVIDER NOTE - MEDICAL DECISION MAKING DETAILS
f/u with PCP and Pt should also f/u with a cardiologist for tachycardia.  Mom and Pt aware. f/u with PCP and Pt should also f/u with a cardiologist for her persistent tachycardia that is noted on very visit that she has had to the ER.   Mom and Pt aware.

## 2018-03-10 VITALS — RESPIRATION RATE: 18 BRPM | OXYGEN SATURATION: 99 %

## 2018-03-13 ENCOUNTER — APPOINTMENT (OUTPATIENT)
Dept: INTERNAL MEDICINE | Facility: CLINIC | Age: 33
End: 2018-03-13

## 2018-03-13 ENCOUNTER — OUTPATIENT (OUTPATIENT)
Dept: OUTPATIENT SERVICES | Facility: HOSPITAL | Age: 33
LOS: 1 days | Discharge: HOME | End: 2018-03-13

## 2018-03-13 VITALS
HEIGHT: 64 IN | BODY MASS INDEX: 28.51 KG/M2 | WEIGHT: 167 LBS | HEART RATE: 131 BPM | DIASTOLIC BLOOD PRESSURE: 89 MMHG | SYSTOLIC BLOOD PRESSURE: 134 MMHG

## 2018-03-13 RX ORDER — RANITIDINE HYDROCHLORIDE 300 MG/1
300 CAPSULE ORAL
Qty: 30 | Refills: 3 | Status: DISCONTINUED | COMMUNITY
Start: 2017-07-24 | End: 2018-03-13

## 2018-03-13 RX ORDER — ASPIRIN 81 MG
6.5 TABLET, DELAYED RELEASE (ENTERIC COATED) ORAL
Qty: 1 | Refills: 0 | Status: DISCONTINUED | COMMUNITY
Start: 2017-06-21 | End: 2018-03-13

## 2018-03-13 RX ORDER — RANITIDINE HYDROCHLORIDE 300 MG/1
TABLET, FILM COATED ORAL
Refills: 0 | Status: DISCONTINUED | COMMUNITY
End: 2018-03-13

## 2018-03-13 RX ORDER — AZELASTINE HYDROCHLORIDE 137 UG/1
137 SPRAY, METERED NASAL EVERY MORNING
Qty: 1 | Refills: 0 | Status: DISCONTINUED | COMMUNITY
Start: 2017-11-06 | End: 2018-03-13

## 2018-03-20 ENCOUNTER — OUTPATIENT (OUTPATIENT)
Dept: OUTPATIENT SERVICES | Facility: HOSPITAL | Age: 33
LOS: 1 days | Discharge: HOME | End: 2018-03-20

## 2018-03-20 ENCOUNTER — INPATIENT (INPATIENT)
Facility: HOSPITAL | Age: 33
LOS: 2 days | Discharge: HOME | End: 2018-03-23
Attending: INTERNAL MEDICINE

## 2018-03-20 VITALS
SYSTOLIC BLOOD PRESSURE: 134 MMHG | TEMPERATURE: 97 F | HEART RATE: 64 BPM | RESPIRATION RATE: 20 BRPM | DIASTOLIC BLOOD PRESSURE: 65 MMHG | OXYGEN SATURATION: 100 %

## 2018-03-20 DIAGNOSIS — R07.9 CHEST PAIN, UNSPECIFIED: ICD-10-CM

## 2018-03-20 LAB
ALBUMIN SERPL ELPH-MCNC: 4.1 G/DL — SIGNIFICANT CHANGE UP (ref 3.5–5.2)
ALP SERPL-CCNC: 81 U/L — SIGNIFICANT CHANGE UP (ref 30–115)
ALT FLD-CCNC: 17 U/L — SIGNIFICANT CHANGE UP (ref 0–41)
ANION GAP SERPL CALC-SCNC: 13 MMOL/L — SIGNIFICANT CHANGE UP (ref 7–14)
AST SERPL-CCNC: 17 U/L — SIGNIFICANT CHANGE UP (ref 0–41)
BILIRUB SERPL-MCNC: 0.2 MG/DL — SIGNIFICANT CHANGE UP (ref 0.2–1.2)
BUN SERPL-MCNC: 13 MG/DL — SIGNIFICANT CHANGE UP (ref 10–20)
CALCIUM SERPL-MCNC: 9 MG/DL — SIGNIFICANT CHANGE UP (ref 8.5–10.1)
CHLORIDE SERPL-SCNC: 101 MMOL/L — SIGNIFICANT CHANGE UP (ref 98–110)
CHOLEST SERPL-MCNC: 147 MG/DL — SIGNIFICANT CHANGE UP (ref 100–200)
CK MB BLD-MCNC: <0 % — SIGNIFICANT CHANGE UP (ref 0–4)
CK MB CFR SERPL CALC: <0.1 NG/ML — LOW (ref 0.6–6.3)
CK SERPL-CCNC: 49 U/L — SIGNIFICANT CHANGE UP (ref 0–225)
CK SERPL-CCNC: 53 U/L — SIGNIFICANT CHANGE UP (ref 0–225)
CO2 SERPL-SCNC: 24 MMOL/L — SIGNIFICANT CHANGE UP (ref 17–32)
CREAT SERPL-MCNC: 0.7 MG/DL — SIGNIFICANT CHANGE UP (ref 0.7–1.5)
D DIMER BLD IA.RAPID-MCNC: 122 NG/ML DDU — SIGNIFICANT CHANGE UP (ref 0–230)
GLUCOSE SERPL-MCNC: 197 MG/DL — HIGH (ref 70–110)
HCT VFR BLD CALC: 36.7 % — LOW (ref 37–47)
HDLC SERPL-MCNC: 50 MG/DL — SIGNIFICANT CHANGE UP (ref 40–125)
HGB BLD-MCNC: 12.6 G/DL — SIGNIFICANT CHANGE UP (ref 12–16)
LIPID PNL WITH DIRECT LDL SERPL: 85 MG/DL — SIGNIFICANT CHANGE UP (ref 4–129)
MCHC RBC-ENTMCNC: 30.4 PG — SIGNIFICANT CHANGE UP (ref 27–31)
MCHC RBC-ENTMCNC: 34.3 G/DL — SIGNIFICANT CHANGE UP (ref 32–37)
MCV RBC AUTO: 88.4 FL — SIGNIFICANT CHANGE UP (ref 81–99)
NRBC # BLD: 0 /100 WBCS — SIGNIFICANT CHANGE UP (ref 0–0)
PLATELET # BLD AUTO: 351 K/UL — SIGNIFICANT CHANGE UP (ref 130–400)
POTASSIUM SERPL-MCNC: 4.2 MMOL/L — SIGNIFICANT CHANGE UP (ref 3.5–5)
POTASSIUM SERPL-SCNC: 4.2 MMOL/L — SIGNIFICANT CHANGE UP (ref 3.5–5)
PROT SERPL-MCNC: 7 G/DL — SIGNIFICANT CHANGE UP (ref 6–8)
RBC # BLD: 4.15 M/UL — LOW (ref 4.2–5.4)
RBC # FLD: 12.3 % — SIGNIFICANT CHANGE UP (ref 11.5–14.5)
SODIUM SERPL-SCNC: 138 MMOL/L — SIGNIFICANT CHANGE UP (ref 135–146)
TOTAL CHOLESTEROL/HDL RATIO MEASUREMENT: 2.9 RATIO — LOW (ref 4–5.5)
TRIGL SERPL-MCNC: 125 MG/DL — SIGNIFICANT CHANGE UP (ref 10–149)
TROPONIN T SERPL-MCNC: <0.01 NG/ML — SIGNIFICANT CHANGE UP
TROPONIN T SERPL-MCNC: <0.01 NG/ML — SIGNIFICANT CHANGE UP
WBC # BLD: 9.01 K/UL — SIGNIFICANT CHANGE UP (ref 4.8–10.8)
WBC # FLD AUTO: 9.01 K/UL — SIGNIFICANT CHANGE UP (ref 4.8–10.8)

## 2018-03-20 RX ORDER — RANITIDINE HYDROCHLORIDE 150 MG/1
0 TABLET, FILM COATED ORAL
Qty: 0 | Refills: 0 | COMMUNITY

## 2018-03-20 RX ORDER — IBUPROFEN 200 MG
600 TABLET ORAL ONCE
Qty: 0 | Refills: 0 | Status: COMPLETED | OUTPATIENT
Start: 2018-03-20 | End: 2018-03-20

## 2018-03-20 RX ORDER — AMITRIPTYLINE HCL 25 MG
0 TABLET ORAL
Qty: 0 | Refills: 0 | COMMUNITY

## 2018-03-20 RX ADMIN — Medication 600 MILLIGRAM(S): at 19:06

## 2018-03-20 RX ADMIN — Medication 600 MILLIGRAM(S): at 16:51

## 2018-03-20 NOTE — ED PROVIDER NOTE - NEUROLOGICAL, MLM
Alert and oriented, no focal deficits. Strength 5/5 in b/l upper and lower extremities. Cranial nerves 2-11 intact.

## 2018-03-20 NOTE — ED ADULT NURSE REASSESSMENT NOTE - NS ED NURSE REASSESS COMMENT FT1
Pt aaoc4 denies chest discomfort, v/s stable. Nsr on the cardiac monitor,  Pt ambulated to the bathroom with  discomfort. safety maintained will continue to monitor.

## 2018-03-20 NOTE — CONSULT NOTE ADULT - ASSESSMENT
32 year female presented with chest pain. nuclear stress test at [facility] (Poor quality )   Plan:  tele monitoring OBSU   CMP CBC Cardiac emzymes  D-Dimer   CCTA   Echocardiogram   Lower extremity Duplex

## 2018-03-20 NOTE — ED ADULT NURSE NOTE - OBJECTIVE STATEMENT
Pt complains of chest discomfort during a stress test. Pt has a pmh of DM htn. Pt denies nausea vomiting or radiation.

## 2018-03-20 NOTE — CONSULT NOTE ADULT - SUBJECTIVE AND OBJECTIVE BOX
CHIEF COMPLAINT:Patient is a 32y old  Female who presents with a chief complaint of chest pain   HISTORY OF PRESENT ILLNESS:  HPI:  32 year old female with PMHx of Dm type 1 on insulin pump sent in from [facility] for chest pain during nuclear stress test. the patient has been having chest pain fro more than a month and was sent by her PMD for nuclear stress test. the pain is retrosternal radiating to both sides undneath the breast area. the pain si worse on climbing stairs and today was associated with Dyspnea.     PAST MEDICAL & SURGICAL HISTORY:  Tachycardia  Type 1 diabetes  Urinary tract infection  Neuropathy    FAMILY HISTORY:  unknown the patient is adopted   Allergies  amoxicillin (Unknown)  Ceclor (Rash)  ciprofloxacin (Unknown)  clindamycin (Unknown)  gabapentin (Unknown)  penicillins (Unknown)      Home Medications:  amitriptyline 50 mg oral tablet:  (20 Feb 2018 14:00)  ammonium lactate topical:  (20 Feb 2018 14:00)  Bactrim:  (20 Feb 2018 14:00)  cyclobenzaprine 5 mg oral tablet:  (20 Feb 2018 14:00)  HumaLOG:  (20 Feb 2018 14:00)  indomethacin 25 mg oral capsule:  (20 Feb 2018 14:00)  raNITIdine:  (20 Feb 2018 14:00)        SOCIAL HISTORY:    [ ] Non-smoker    REVIEW OF SYSTEMS:  Patient denies chest pain, shortness of breath, dyspnea on exertion, palpitations.   PHYSICAL EXAM:  T(C): 36.1 (03-20-18 @ 11:12), Max: 36.1 (03-20-18 @ 11:12)  HR: 64 (03-20-18 @ 11:12) (64 - 64)  BP: 134/65 (03-20-18 @ 11:12) (134/65 - 134/65)  RR: 20 (03-20-18 @ 11:12) (20 - 20)  SpO2: 100% (03-20-18 @ 11:12) (100% - 100%)    General Appearance: Normal	  Cardiovascular: Normal S1 S2, No JVD, No murmurs, No edema  Respiratory: Lungs clear to auscultation	  Gastrointestinal:  Soft, Non-tender  Skin: No rashes, No ecchymoses, No cyanosis	  Neurologic: Non-focal  Extremities: Normal range of motion, No clubbing, cyanosis or edema  Vascular: Peripheral pulses palpable 2+ bilaterally  LABS:	     Pending 	      CARDIAC MARKERS:      TELEMETRY EVENTS: 	    ECG:  	Nrmal Sinus rhythm

## 2018-03-20 NOTE — ED PROVIDER NOTE - PMH
Degenerative disc disease, thoracic    Gastroesophageal reflux disease, esophagitis presence not specified    Hypertension, unspecified type    Intractable headache, unspecified chronicity pattern, unspecified headache type    Major depressive disorder with single episode, in full remission  previously treated with zyprexa, celexa and lexapro  Neuropathy  right lower extremity, vaginal  Tachycardia    Type 1 diabetes    Urinary tract infection    Urinary tract infection, recurrent

## 2018-03-20 NOTE — ED PROVIDER NOTE - MUSCULOSKELETAL, MLM
Back non-tender. Spine appears normal, range of motion is not limited, no muscle or joint tenderness

## 2018-03-20 NOTE — ED CDU PROVIDER INITIAL DAY NOTE - ATTENDING CONTRIBUTION TO CARE
Patient was brought to EDOU for further evaluation of chest pains, lungs- clear, abdomen- soft no tenderness to region, neuro- non focal ,s1s2 no gallops or murmurs, full workup pending will continue to re-evaluate

## 2018-03-20 NOTE — ED PROVIDER NOTE - OBJECTIVE STATEMENT
31yo female with pmh of DMI on insulin pump, neuropathy, GERD, chronic headaches, HTN, recurrent UTIs and right hand tremor. Pt presenting for abnormal stress test today. H/o retrosternal squeezing chest pain x 1month, constant, radiating under b/l breast with tendernerness in sternal region. Pain associated with tachycardia. Referred for stress test by PMD. During the exercise she experienced SOB and tremors in addition to chest pain. Pt reports trying ibuprofen with no relief. Patient denies noting anything that worsens or improves her CP: climbing stairs, sitting, standing, laying, eating. Patient denies any fevers, chills, nausea, abdominal pain, diarrhea, or constipation. Denies exacerbation of chronic headache, vaginal neuropathy, right lower extremity neuropathy.   PMD: Juan Antonio  Neuro: Kadie

## 2018-03-20 NOTE — ED ADULT NURSE REASSESSMENT NOTE - NS ED NURSE REASSESS COMMENT FT1
Pt received from Main ER; pt A&Ox4. Pt states she is feeling much better than earlier; currently denies pain or discomfort. Continuous cardiac monitoring maintained. Will continue to monitor.

## 2018-03-20 NOTE — ED PROVIDER NOTE - ATTENDING CONTRIBUTION TO CARE
pt presents to ED with CP and elevated HR/Bp during outpt stress test.  h/o DM, no fevers, no sick contacts, no injury or fall. pt well appears well now and no pain.    AVSS, exam as noted, CTAB, RRR, abdomen soft NTND, (+) bowel sounds, neuro nonfocal

## 2018-03-21 RX ORDER — CYCLOBENZAPRINE HYDROCHLORIDE 10 MG/1
5 TABLET, FILM COATED ORAL AT BEDTIME
Qty: 0 | Refills: 0 | Status: DISCONTINUED | OUTPATIENT
Start: 2018-03-21 | End: 2018-03-23

## 2018-03-21 RX ORDER — SODIUM CHLORIDE 9 MG/ML
1000 INJECTION INTRAMUSCULAR; INTRAVENOUS; SUBCUTANEOUS
Qty: 0 | Refills: 0 | Status: DISCONTINUED | OUTPATIENT
Start: 2018-03-21 | End: 2018-03-23

## 2018-03-21 RX ORDER — FLUTICASONE PROPIONATE 50 MCG
0 SPRAY, SUSPENSION NASAL
Qty: 0 | Refills: 0 | COMMUNITY

## 2018-03-21 RX ORDER — SOD,AMMONIUM,POTASSIUM LACTATE
0 CREAM (GRAM) TOPICAL
Qty: 0 | Refills: 0 | COMMUNITY

## 2018-03-21 RX ORDER — CYCLOBENZAPRINE HYDROCHLORIDE 10 MG/1
0 TABLET, FILM COATED ORAL
Qty: 0 | Refills: 0 | COMMUNITY

## 2018-03-21 RX ORDER — LOPERAMIDE HCL 2 MG
2 TABLET ORAL
Qty: 0 | Refills: 0 | Status: DISCONTINUED | OUTPATIENT
Start: 2018-03-21 | End: 2018-03-23

## 2018-03-21 RX ORDER — FAMOTIDINE 10 MG/ML
20 INJECTION INTRAVENOUS ONCE
Qty: 0 | Refills: 0 | Status: COMPLETED | OUTPATIENT
Start: 2018-03-21 | End: 2018-03-21

## 2018-03-21 RX ORDER — METOPROLOL TARTRATE 50 MG
100 TABLET ORAL ONCE
Qty: 0 | Refills: 0 | Status: COMPLETED | OUTPATIENT
Start: 2018-03-21 | End: 2018-03-21

## 2018-03-21 RX ORDER — DEXTROSE 50 % IN WATER 50 %
25 SYRINGE (ML) INTRAVENOUS ONCE
Qty: 0 | Refills: 0 | Status: COMPLETED | OUTPATIENT
Start: 2018-03-21 | End: 2018-03-21

## 2018-03-21 RX ORDER — ENOXAPARIN SODIUM 100 MG/ML
40 INJECTION SUBCUTANEOUS DAILY
Qty: 0 | Refills: 0 | Status: DISCONTINUED | OUTPATIENT
Start: 2018-03-21 | End: 2018-03-23

## 2018-03-21 RX ORDER — INDOMETHACIN 50 MG
0 CAPSULE ORAL
Qty: 0 | Refills: 0 | COMMUNITY

## 2018-03-21 RX ORDER — METOPROLOL TARTRATE 50 MG
12.5 TABLET ORAL
Qty: 0 | Refills: 0 | Status: DISCONTINUED | OUTPATIENT
Start: 2018-03-21 | End: 2018-03-23

## 2018-03-21 RX ORDER — METOPROLOL TARTRATE 50 MG
0 TABLET ORAL
Qty: 0 | Refills: 0 | COMMUNITY

## 2018-03-21 RX ORDER — FAMOTIDINE 10 MG/ML
20 INJECTION INTRAVENOUS DAILY
Qty: 0 | Refills: 0 | Status: DISCONTINUED | OUTPATIENT
Start: 2018-03-21 | End: 2018-03-23

## 2018-03-21 RX ORDER — METOPROLOL TARTRATE 50 MG
50 TABLET ORAL ONCE
Qty: 0 | Refills: 0 | Status: COMPLETED | OUTPATIENT
Start: 2018-03-21 | End: 2018-03-21

## 2018-03-21 RX ORDER — METOPROLOL TARTRATE 50 MG
5 TABLET ORAL ONCE
Qty: 0 | Refills: 0 | Status: COMPLETED | OUTPATIENT
Start: 2018-03-21 | End: 2018-03-21

## 2018-03-21 RX ORDER — AMITRIPTYLINE HCL 25 MG
75 TABLET ORAL AT BEDTIME
Qty: 0 | Refills: 0 | Status: DISCONTINUED | OUTPATIENT
Start: 2018-03-21 | End: 2018-03-23

## 2018-03-21 RX ORDER — RANITIDINE HYDROCHLORIDE 150 MG/1
1 TABLET, FILM COATED ORAL
Qty: 0 | Refills: 0 | COMMUNITY

## 2018-03-21 RX ORDER — FLUTICASONE PROPIONATE 50 MCG
1 SPRAY, SUSPENSION NASAL
Qty: 0 | Refills: 0 | Status: DISCONTINUED | OUTPATIENT
Start: 2018-03-21 | End: 2018-03-23

## 2018-03-21 RX ORDER — ACETAMINOPHEN 500 MG
650 TABLET ORAL EVERY 4 HOURS
Qty: 0 | Refills: 0 | Status: DISCONTINUED | OUTPATIENT
Start: 2018-03-21 | End: 2018-03-23

## 2018-03-21 RX ADMIN — Medication 25 MILLILITER(S): at 11:21

## 2018-03-21 RX ADMIN — FAMOTIDINE 20 MILLIGRAM(S): 10 INJECTION INTRAVENOUS at 09:55

## 2018-03-21 RX ADMIN — FAMOTIDINE 20 MILLIGRAM(S): 10 INJECTION INTRAVENOUS at 18:17

## 2018-03-21 RX ADMIN — Medication 50 MILLIGRAM(S): at 10:58

## 2018-03-21 RX ADMIN — SODIUM CHLORIDE 1000 MILLILITER(S): 9 INJECTION INTRAMUSCULAR; INTRAVENOUS; SUBCUTANEOUS at 09:27

## 2018-03-21 RX ADMIN — Medication 50 MILLIGRAM(S): at 09:29

## 2018-03-21 RX ADMIN — CYCLOBENZAPRINE HYDROCHLORIDE 5 MILLIGRAM(S): 10 TABLET, FILM COATED ORAL at 22:02

## 2018-03-21 RX ADMIN — Medication 75 MILLIGRAM(S): at 22:02

## 2018-03-21 RX ADMIN — Medication 5 MILLIGRAM(S): at 12:50

## 2018-03-21 RX ADMIN — Medication 100 MILLIGRAM(S): at 08:00

## 2018-03-21 RX ADMIN — SODIUM CHLORIDE 1000 MILLILITER(S): 9 INJECTION INTRAMUSCULAR; INTRAVENOUS; SUBCUTANEOUS at 22:52

## 2018-03-21 NOTE — ED CDU PROVIDER SUBSEQUENT DAY NOTE - NS ED ATTENDING STATEMENT MOD
I have personally performed a face to face diagnostic evaluation on this patient. I have reviewed the ACP note and agree with the history, exam and plan of care, except as noted. Pt reports having unprotected sex last night and after being told partner was HIV+ with undetectable viral load.

## 2018-03-21 NOTE — ED ADULT NURSE REASSESSMENT NOTE - NS ED NURSE REASSESS COMMENT FT1
Pt currently has mild chest discomfort, nsr on the cardiac monitor, v/s stable, pt outstanding for cardiac echo in am.

## 2018-03-21 NOTE — ED CDU PROVIDER DISPOSITION NOTE - CLINICAL COURSE
Patient was sent to EDOU for further evaluation of atypical chest pains, ekgs times two no evidence of ischemic chnages, enzymes times two normal, Attempted to get CCTA of coronaries unable to complete secondary to increase in HR despite Lopressor PO and IV, spoke to radiology Monica CORRAL unable to complete it today secondary to fast hear rate, Cardiology fellow and Monica Dhillon aware of situation, they want patient admitted to telemetry

## 2018-03-21 NOTE — ED ADULT NURSE REASSESSMENT NOTE - NS ED NURSE REASSESS COMMENT FT1
Patient awaiting CTA .Medicated as per order. Alert, oriented X 3 ,resting in bed, cardiac moniter in place .Will continue to moniter

## 2018-03-21 NOTE — H&P ADULT - NSHPPHYSICALEXAM_GEN_ALL_CORE
Vital Signs Last 24 Hrs  T(C): 36.1 (21 Mar 2018 16:38), Max: 36.6 (20 Mar 2018 19:22)  T(F): 97 (21 Mar 2018 16:38), Max: 97.8 (20 Mar 2018 19:22)  HR: 76 (21 Mar 2018 16:38) (76 - 106)  BP: 105/65 (21 Mar 2018 16:38) (105/65 - 139/102)  RR: 20 (21 Mar 2018 16:38) (18 - 20)  SpO2: 99% (21 Mar 2018 16:38) (97% - 100%)      GENERAL: NAD, well-developed, Non-toxic, stated age   HEAD:  Atraumatic, Normocephalic  EYES: EOMI, PERRLA, conjunctiva and sclera clear  NECK: Supple, No JVD  CHEST/LUNG: Clear to auscultation bilaterally; No wheezing, rhonchi, or crackles  HEART: Regular rate and rhythm; s1, s2, No murmurs, rubs, or gallops  ABDOMEN: Soft, Nontender, Nondistended; Bowel sounds present, No rebound or guarding noted   EXTREMITIES:  No lower extremity edema or calf tenderness to palpation.  No clubbing or cyanosis  PSYCH: AAOx3  NEUROLOGY: non-focal  SKIN: No rashes or lesions

## 2018-03-21 NOTE — H&P ADULT - NSHPREVIEWOFSYSTEMS_GEN_ALL_CORE
CONSTITUTIONAL: No weakness, fevers or chills  EYES/ENT: No visual changes;  No vertigo or throat pain   NECK: No pain or stiffness  RESPIRATORY: No cough, wheezing, hemoptysis; No shortness of breath  CARDIOVASCULAR: See HPI   GASTROINTESTINAL: No abdominal or epigastric pain. No nausea, vomiting, or hematemesis; No diarrhea or constipation. No melena or hematochezia.  GENITOURINARY: No dysuria, frequency or hematuria  NEUROLOGICAL: No numbness or weakness  MUSCULOSKELETAL: No myalgias, arthralgias, or joint swelling  SKIN: No itching, rashes

## 2018-03-21 NOTE — H&P ADULT - NSHPSOCIALHISTORY_GEN_ALL_CORE
Marital Status:  (   )    (  x ) Single    (   )    (  )   Lives with: (  ) alone  (  ) children   (  ) spouse   ( x ) parents  (  ) other  Recent Travel: None    Substance Use (street drugs): (x  ) never used  (  ) other:  Tobacco Usage:  (  x ) never smoked   (   ) former smoker   (   ) current smoker  (     ) pack year  Alcohol Usage: Denied

## 2018-03-21 NOTE — H&P ADULT - ASSESSMENT
32 F with pmhx of Type I DM, DM neuropathy, GERD, Degenerative disc disease, Headaches, and R hand Tremor presents to ED for CP x1 month, now associated with SOB. Possible Unstable angina from CAD vs Atypical CP from GERD     Chest Pain: Unstable Angina vs GERD  CE Neg x3, no events on tele.   Echo normal   NM stress test shows reversible lesion suspicious for ischemia   Cardiology Follow up  Patient may need attempt CCTA again tomorrow. Decision to be made by cardio. ASA started   Continue home medications     GERD: uncontrolled  Continue with pepcid q12  Her endcrinologist doesnt want her on a PPI  Maalox PRN    DM I:  Pt managing with insulin pump.  check finger sticks     Degenerative Disc Dx: MRI in 3/2018 showed no changes  Continue with home meds  No acute management at this time  Is being worked up as out pat. for this.     Headache: Stable     DVT: Lovenox

## 2018-03-21 NOTE — H&P ADULT - NSHPLABSRESULTS_GEN_ALL_CORE
12.6   9.01  )-----------( 351      ( 20 Mar 2018 13:24 )             36.7       03-20    138  |  101  |  13  ----------------------------<  197<H>  4.2   |  24  |  0.7    Ca    9.0      20 Mar 2018 13:24  Mg     1.9     03-20    TPro  7.0  /  Alb  4.1  /  TBili  0.2  /  DBili  x   /  AST  17  /  ALT  17  /  AlkPhos  81  03-20          CARDIAC MARKERS ( 20 Mar 2018 17:58 )  x     / <0.01 ng/mL / 53 U/L / x     / <0.1 ng/mL  CARDIAC MARKERS ( 20 Mar 2018 13:24 )  x     / <0.01 ng/mL / 49 U/L / x     / <0.1 ng/mL

## 2018-03-21 NOTE — H&P ADULT - ATTENDING COMMENTS
Patient seen and examined independently. Agree with resident note/ history / physical exam and plan of care with following exceptions. Case discussed with house-staff, nursing and patient/pt decision maker.     CC: Patient is a 32y old  Female who presents with a chief complaint of persistent CP and palpitations x 1 month (21 Mar 2018 16:35)    HPI:  32 F with pmhx of Type I DM, DM neuropathy, GERD, Degenerative disc disease, Headaches, and R hand Tremor presents to ED for CP associated with SOB. Patient states the CP began on month ago,~7/10, intermittent, squeezing pain, beneath b/l breasts with radiation to the back, worsened with laying down and no alleviating factors. Pain is also associated with palpitations. Was seen by PMD and scheduled for a NM stress test at [facility]. During the later portion of the stress test pain began experiencing worsening CP, SOB (for the first time), R hand tremors, Leg and back pain. Patient also admits to worsening GERD symptoms, though no night time cough, and no morning sore throat. Patient was sent to ED. Cardiology was consulted in ED and told to schedule for CCTA and held in obs. unit. No significant events while in the unit. Pt couldnt receive CCTA due to inability to lower HR enough, received Diltiazem 60, Metoprolol , 50, 50, and 5mg IV push.   Currently no SOB but CP remains constant. Started on metoprolol 3/14/2018  CE neg x3, D-Dimer neg  2d Echo:  EF 55-60%, Trave MV regrug, Mild PV and TV regurg. Aortal normal.   NM Stress Test: Mod Size, Mild-Mod severity distal anterior, anteroapical defect with complete reversibility which may be consistent with ischemia in the distribution   of the mid to distal left anterior descending coronary artery  LE Venous Duplex: Negative for DVT (21 Mar 2018 16:35)    PAST MEDICAL & SURGICAL HISTORY:  Tremor of right hand  Major depressive disorder with single episode, in full remission: previously treated with zyprexa, celexa and lexapro  Intractable headache, unspecified chronicity pattern, unspecified headache type  Gastroesophageal reflux disease, esophagitis presence not specified  Urinary tract infection, recurrent  Degenerative disc disease, thoracic  Type 1 diabetes  Neuropathy: right lower extremity, vaginal  No significant past surgical history    Vital Signs Last 24 Hrs  T(C): 35.7 (22 Mar 2018 08:02), Max: 36.9 (21 Mar 2018 20:10)  T(F): 96.3 (22 Mar 2018 08:02), Max: 98.5 (21 Mar 2018 20:10)  HR: 90 (22 Mar 2018 11:37) (76 - 90)  BP: 93/53 (22 Mar 2018 11:37) (93/53 - 120/78)  BP(mean): --  RR: 18 (22 Mar 2018 08:02) (18 - 20)  SpO2: 99% (21 Mar 2018 23:15) (99% - 100%)                        12.6   9.01  )-----------( 351      ( 20 Mar 2018 13:24 )             36.7     03-20    138  |  101  |  13  ----------------------------<  197<H>  4.2   |  24  |  0.7    Ca    9.0      20 Mar 2018 13:24  Mg     1.9     03-20    TPro  7.0  /  Alb  4.1  /  TBili  0.2  /  DBili  x   /  AST  17  /  ALT  17  /  AlkPhos  81  03-20    CARDIAC MARKERS ( 20 Mar 2018 17:58 )  x     / <0.01 ng/mL / 53 U/L / x     / <0.1 ng/mL  CARDIAC MARKERS ( 20 Mar 2018 13:24 )  x     / <0.01 ng/mL / 49 U/L / x     / <0.1 ng/mL            MEDICATIONS  (STANDING):  amitriptyline 75 milliGRAM(s) Oral at bedtime  cyclobenzaprine 5 milliGRAM(s) Oral at bedtime  diltiazem Infusion 10 mG/Hr (10 mL/Hr) IV Continuous <Continuous>  enoxaparin Injectable 40 milliGRAM(s) SubCutaneous daily  famotidine    Tablet 20 milliGRAM(s) Oral daily  fluticasone propionate 50 MICROgram(s)/spray Nasal Spray 1 Spray(s) Both Nostrils <User Schedule>  loperamide 2 milliGRAM(s) Oral two times a day  metoprolol tartrate 12.5 milliGRAM(s) Oral two times a day  multivitamin 1 Tablet(s) Oral daily  sodium chloride 0.9% Bolus 500 milliLiter(s) IV Bolus once  sodium chloride 0.9%. 1000 milliLiter(s) (1000 mL/Hr) IV Continuous <Continuous>    Spoke with cardio    A/P  - CP( resolved, atypical : not related to activity) , positive stress test, for CCTA today ( postponed because HR is still too fast) cont Cardizem drip , when HR is <60 will reorder CCTA.   - DM with neuropathy, out pt follow up.     DC home if CCTA negative.

## 2018-03-21 NOTE — H&P ADULT - PMH
Degenerative disc disease, thoracic    Gastroesophageal reflux disease, esophagitis presence not specified    Intractable headache, unspecified chronicity pattern, unspecified headache type    Major depressive disorder with single episode, in full remission  previously treated with zyprexa, celexa and lexapro  Neuropathy  right lower extremity, vaginal  Tremor of right hand    Type 1 diabetes    Urinary tract infection, recurrent

## 2018-03-21 NOTE — ED CDU PROVIDER SUBSEQUENT DAY NOTE - PROGRESS NOTE DETAILS
Pt seen and examined at bedside, resting comfortably without any complaints.  CCTA ordered, will give Metoprolol 100mg for HR.  Will reasses and continue to monitor. Pt HR not coming down despite Metoprolol 200 mg PO, Cardizem 60 mg PO, Lopressor 5 mg IV.  CCTA to be cancelled after discussion with Radiology and Cardiology.  Pt to be admitted and started on IV cardizem.

## 2018-03-21 NOTE — H&P ADULT - HISTORY OF PRESENT ILLNESS
32 F with pmhx of Type I DM, DM neuropathy, GERD, Degenerative disc disease, Headaches, and R hand Tremor presents to ED for CP associated with SOB. Patient states the CP began on month ago,~7/10, intermittent, squeezing pain, beneath b/l breasts with radiation to the back, worsened with laying down and no alleviating factors. Pain is also associated with palpitations. Was seen by PMD and scheduled for a NM stress test at [facility]. During the later portion of the stress test pain began experiencing worsening CP, SOB (for the first time), R hand tremors, Leg and back pain. Patient also admits to worsening GERD symptoms, though no night time cough, and no morning sore throat. Patient was sent to ED. Cardiology was consulted in ED and told to schedule for CCTA and held in obs. unit. No significant events while in the unit. Pt couldnt receive CCTA due to inability to lower HR enough, received Diltiazem 60, Metoprolol , 50, 50, and 5mg IV push.   Currently no SOB but CP remains constant. Started on metoprolol 3/14/2018  CE neg x3, D-Dimer neg  2d Echo:  EF 55-60%, Trave MV regrug, Mild PV and TV regurg. Aortal normal.   NM Stress Test: Mod Size, Mild-Mod severity distal anterior, anteroapical defect with complete reversibility which may be consistent with ischemia in the distribution   of the mid to distal left anterior descending coronary artery  LE Venous Duplex: Negative for DVT

## 2018-03-22 ENCOUNTER — APPOINTMENT (OUTPATIENT)
Dept: UROLOGY | Facility: CLINIC | Age: 33
End: 2018-03-22

## 2018-03-22 DIAGNOSIS — R07.89 OTHER CHEST PAIN: ICD-10-CM

## 2018-03-22 DIAGNOSIS — E10.9 TYPE 1 DIABETES MELLITUS WITHOUT COMPLICATIONS: ICD-10-CM

## 2018-03-22 RX ORDER — DILTIAZEM HCL 120 MG
10 CAPSULE, EXT RELEASE 24 HR ORAL
Qty: 125 | Refills: 0 | Status: DISCONTINUED | OUTPATIENT
Start: 2018-03-22 | End: 2018-03-23

## 2018-03-22 RX ORDER — SODIUM CHLORIDE 9 MG/ML
500 INJECTION INTRAMUSCULAR; INTRAVENOUS; SUBCUTANEOUS ONCE
Qty: 0 | Refills: 0 | Status: COMPLETED | OUTPATIENT
Start: 2018-03-22 | End: 2018-03-22

## 2018-03-22 RX ORDER — ALPRAZOLAM 0.25 MG
0.25 TABLET ORAL ONCE
Qty: 0 | Refills: 0 | Status: DISCONTINUED | OUTPATIENT
Start: 2018-03-22 | End: 2018-03-22

## 2018-03-22 RX ADMIN — Medication 75 MILLIGRAM(S): at 21:59

## 2018-03-22 RX ADMIN — SODIUM CHLORIDE 1000 MILLILITER(S): 9 INJECTION INTRAMUSCULAR; INTRAVENOUS; SUBCUTANEOUS at 11:11

## 2018-03-22 RX ADMIN — SODIUM CHLORIDE 1000 MILLILITER(S): 9 INJECTION INTRAMUSCULAR; INTRAVENOUS; SUBCUTANEOUS at 13:07

## 2018-03-22 RX ADMIN — Medication 12.5 MILLIGRAM(S): at 17:14

## 2018-03-22 RX ADMIN — Medication 1 TABLET(S): at 12:15

## 2018-03-22 RX ADMIN — ENOXAPARIN SODIUM 40 MILLIGRAM(S): 100 INJECTION SUBCUTANEOUS at 12:15

## 2018-03-22 RX ADMIN — FAMOTIDINE 20 MILLIGRAM(S): 10 INJECTION INTRAVENOUS at 12:15

## 2018-03-22 RX ADMIN — Medication 1 SPRAY(S): at 12:15

## 2018-03-22 RX ADMIN — CYCLOBENZAPRINE HYDROCHLORIDE 5 MILLIGRAM(S): 10 TABLET, FILM COATED ORAL at 21:59

## 2018-03-22 RX ADMIN — Medication 10 MG/HR: at 11:11

## 2018-03-22 RX ADMIN — Medication 0.25 MILLIGRAM(S): at 15:54

## 2018-03-22 RX ADMIN — Medication 12.5 MILLIGRAM(S): at 06:19

## 2018-03-22 NOTE — PROGRESS NOTE ADULT - ASSESSMENT
32 F with pmhx of Type I DM, DM neuropathy, GERD, Degenerative disc disease, Headaches, and R hand Tremor presents to ED for CP x1 month, now associated with SOB. Possible Unstable angina from CAD vs Atypical CP from GERD     Chest Pain: r/o acs  CE Neg x3, no events on tele.   Echo normal   NM stress test shows reversible lesion suspicious for ischemia   Cardiology -  CCTA after HR lowered to 60's by cardizem drip;. ASA started   Continue home medications     GERD: uncontrolled  Continue with pepcid q12  Her endcrinologist doesnt want her on a PPI  Maalox PRN    DM I:  Pt managing with insulin pump.  check finger sticks     Degenerative Disc Dx: MRI in 3/2018 showed no changes  Continue with home meds  No acute management at this time  Is being worked up as out pat. for this.     Headache: Stable     DVT: Lovenox     dispo  home

## 2018-03-22 NOTE — PROGRESS NOTE ADULT - SUBJECTIVE AND OBJECTIVE BOX
SUBJECTIVE:    Patient is a 32y old Female who presents with a chief complaint of persistent CP and palpitations x 1 month (21 Mar 2018 16:35)    Currently admitted to medicine with the primary diagnosis of Chest pain, unspecified type     Today is hospital day 2d. This morning she is resting comfortably in bed and reports no new issues or overnight events.     PAST MEDICAL & SURGICAL HISTORY  Tremor of right hand  Major depressive disorder with single episode, in full remission: previously treated with zyprexa, celexa and lexapro  Intractable headache, unspecified chronicity pattern, unspecified headache type  Gastroesophageal reflux disease, esophagitis presence not specified  Urinary tract infection, recurrent  Degenerative disc disease, thoracic  Type 1 diabetes  Neuropathy: right lower extremity, vaginal  No significant past surgical history    SOCIAL HISTORY:  Negative for smoking/alcohol/drug use.     ALLERGIES:  amoxicillin (Unknown)  Ceclor (Rash)  ciprofloxacin (Unknown)  clindamycin (Unknown)  gabapentin (Unknown)  Influenza Virus Vaccine, H5N1, Inactivated (Unknown)  penicillins (Unknown)    MEDICATIONS:  STANDING MEDICATIONS  amitriptyline 75 milliGRAM(s) Oral at bedtime  cyclobenzaprine 5 milliGRAM(s) Oral at bedtime  diltiazem Infusion 10 mG/Hr IV Continuous <Continuous>  enoxaparin Injectable 40 milliGRAM(s) SubCutaneous daily  famotidine    Tablet 20 milliGRAM(s) Oral daily  fluticasone propionate 50 MICROgram(s)/spray Nasal Spray 1 Spray(s) Both Nostrils <User Schedule>  loperamide 2 milliGRAM(s) Oral two times a day  metoprolol tartrate 12.5 milliGRAM(s) Oral two times a day  multivitamin 1 Tablet(s) Oral daily  sodium chloride 0.9%. 1000 milliLiter(s) IV Continuous <Continuous>    PRN MEDICATIONS  acetaminophen   Tablet. 650 milliGRAM(s) Oral every 4 hours PRN  aluminum hydroxide/magnesium hydroxide/simethicone Suspension 30 milliLiter(s) Oral every 4 hours PRN    VITALS:   T(F): 96.3  HR: 79  BP: 109/63  RR: 18  SpO2: 99%    LABS:                        12.6   9.01  )-----------( 351      ( 20 Mar 2018 13:24 )             36.7     03-20    138  |  101  |  13  ----------------------------<  197<H>  4.2   |  24  |  0.7    Ca    9.0      20 Mar 2018 13:24  Mg     1.9     03-20    TPro  7.0  /  Alb  4.1  /  TBili  0.2  /  DBili  x   /  AST  17  /  ALT  17  /  AlkPhos  81  03-20              CARDIAC MARKERS ( 20 Mar 2018 17:58 )  x     / <0.01 ng/mL / 53 U/L / x     / <0.1 ng/mL  CARDIAC MARKERS ( 20 Mar 2018 13:24 )  x     / <0.01 ng/mL / 49 U/L / x     / <0.1 ng/mL      RADIOLOGY:    PHYSICAL EXAM:  GEN: No acute distress  LUNGS: Clear to auscultation bilaterally   HEART: S1/S2 present. RRR.   ABD: Soft, non-tender, non-distended. Bowel sounds present  EXT: NC/NC/NE/2+PP/JOHNSON  NEURO: AAOX3

## 2018-03-23 ENCOUNTER — TRANSCRIPTION ENCOUNTER (OUTPATIENT)
Age: 33
End: 2018-03-23

## 2018-03-23 VITALS
OXYGEN SATURATION: 100 % | SYSTOLIC BLOOD PRESSURE: 112 MMHG | HEART RATE: 111 BPM | DIASTOLIC BLOOD PRESSURE: 67 MMHG | TEMPERATURE: 98 F | RESPIRATION RATE: 18 BRPM

## 2018-03-23 LAB
ANION GAP SERPL CALC-SCNC: 12 MMOL/L — SIGNIFICANT CHANGE UP (ref 7–14)
BUN SERPL-MCNC: 11 MG/DL — SIGNIFICANT CHANGE UP (ref 10–20)
CALCIUM SERPL-MCNC: 8.8 MG/DL — SIGNIFICANT CHANGE UP (ref 8.5–10.1)
CHLORIDE SERPL-SCNC: 104 MMOL/L — SIGNIFICANT CHANGE UP (ref 98–110)
CO2 SERPL-SCNC: 22 MMOL/L — SIGNIFICANT CHANGE UP (ref 17–32)
CREAT SERPL-MCNC: 0.6 MG/DL — LOW (ref 0.7–1.5)
GLUCOSE SERPL-MCNC: 160 MG/DL — HIGH (ref 70–110)
HCT VFR BLD CALC: 36.9 % — LOW (ref 37–47)
HGB BLD-MCNC: 12.5 G/DL — SIGNIFICANT CHANGE UP (ref 12–16)
MAGNESIUM SERPL-MCNC: 1.7 MG/DL — LOW (ref 1.8–2.4)
MCHC RBC-ENTMCNC: 30.4 PG — SIGNIFICANT CHANGE UP (ref 27–31)
MCHC RBC-ENTMCNC: 33.9 G/DL — SIGNIFICANT CHANGE UP (ref 32–37)
MCV RBC AUTO: 89.8 FL — SIGNIFICANT CHANGE UP (ref 81–99)
NRBC # BLD: 0 /100 WBCS — SIGNIFICANT CHANGE UP (ref 0–0)
PLATELET # BLD AUTO: 322 K/UL — SIGNIFICANT CHANGE UP (ref 130–400)
POTASSIUM SERPL-MCNC: 4.1 MMOL/L — SIGNIFICANT CHANGE UP (ref 3.5–5)
POTASSIUM SERPL-SCNC: 4.1 MMOL/L — SIGNIFICANT CHANGE UP (ref 3.5–5)
RBC # BLD: 4.11 M/UL — LOW (ref 4.2–5.4)
RBC # FLD: 12.4 % — SIGNIFICANT CHANGE UP (ref 11.5–14.5)
SODIUM SERPL-SCNC: 138 MMOL/L — SIGNIFICANT CHANGE UP (ref 135–146)
WBC # BLD: 7.84 K/UL — SIGNIFICANT CHANGE UP (ref 4.8–10.8)
WBC # FLD AUTO: 7.84 K/UL — SIGNIFICANT CHANGE UP (ref 4.8–10.8)

## 2018-03-23 RX ORDER — MAGNESIUM SULFATE 500 MG/ML
1 VIAL (ML) INJECTION ONCE
Qty: 0 | Refills: 0 | Status: DISCONTINUED | OUTPATIENT
Start: 2018-03-23 | End: 2018-03-23

## 2018-03-23 RX ORDER — ALPRAZOLAM 0.25 MG
0.25 TABLET ORAL THREE TIMES A DAY
Qty: 0 | Refills: 0 | Status: DISCONTINUED | OUTPATIENT
Start: 2018-03-23 | End: 2018-03-23

## 2018-03-23 RX ADMIN — Medication 1 TABLET(S): at 13:40

## 2018-03-23 RX ADMIN — Medication 1 SPRAY(S): at 14:50

## 2018-03-23 RX ADMIN — ENOXAPARIN SODIUM 40 MILLIGRAM(S): 100 INJECTION SUBCUTANEOUS at 13:41

## 2018-03-23 RX ADMIN — Medication 12.5 MILLIGRAM(S): at 05:32

## 2018-03-23 RX ADMIN — Medication 0.25 MILLIGRAM(S): at 10:50

## 2018-03-23 RX ADMIN — FAMOTIDINE 20 MILLIGRAM(S): 10 INJECTION INTRAVENOUS at 13:41

## 2018-03-23 NOTE — DISCHARGE NOTE ADULT - PATIENT PORTAL LINK FT
You can access the [facility] Patient Portal, offered by [facility], by registering with the following website: [email or web address]

## 2018-03-23 NOTE — DISCHARGE NOTE ADULT - CARE PLAN
Principal Discharge DX:	Chest pain, unspecified type  Goal:	finding etiology; r/o CAD  Assessment and plan of treatment:	f/u with cardiology

## 2018-03-23 NOTE — DISCHARGE NOTE ADULT - MEDICATION SUMMARY - MEDICATIONS TO TAKE
I will START or STAY ON the medications listed below when I get home from the hospital:    amitriptyline 75 mg oral tablet  -- 1 tab(s) by mouth once a day (at bedtime)  -- Indication: For Major depressive disorder with single episode, in full remission    HumaLOG  -- Indication: For Diabetes    Imodium 2 mg oral capsule  -- 1 tab(s) by mouth 2 times a day  -- Indication: For Constipation    Metoprolol Tartrate 25 mg oral tablet  -- 0.5 tab(s) by mouth 2 times a day  -- Indication: For CHEST PAIN UNSPECIFIED TYPE    clotrimazole 2% vaginal cream with applicator  -- 1 applicatorful vaginally 2 times a day  -- Indication: For vaginal infection    raNITIdine 300 mg oral capsule  -- 1 cap(s) by mouth once a day (in the evening)  -- Indication: For Gastroesophageal reflux disease, esophagitis presence not specified    cyclobenzaprine 5 mg oral tablet  -- 1 tab(s) by mouth once a day (at bedtime)  -- Indication: For Degenerative disc disease, thoracic    Flonase 50 mcg/inh nasal spray  -- 1 spray(s) into nose once a day  -- Indication: For allergy    multivitamin  -- 1 tab(s) by mouth once a day  -- Indication: For health maintenance

## 2018-03-23 NOTE — DISCHARGE NOTE ADULT - CARE PROVIDER_API CALL
Vladimir Kennedy), Internal Medicine  [address] [place] [address]  Phone: [phone]  Fax: [phone]    Casey Christian), Cardiovascular Disease; Interventional Cardiology  [address] [place] [address]  Phone: [phone]  Fax: [phone]

## 2018-03-24 LAB — TSH SERPL-MCNC: 1.38 UIU/ML — SIGNIFICANT CHANGE UP (ref 0.27–4.2)

## 2018-03-26 DIAGNOSIS — E10.40 TYPE 1 DIABETES MELLITUS WITH DIABETIC NEUROPATHY, UNSPECIFIED: ICD-10-CM

## 2018-03-26 DIAGNOSIS — I95.9 HYPOTENSION, UNSPECIFIED: ICD-10-CM

## 2018-03-26 DIAGNOSIS — R00.0 TACHYCARDIA, UNSPECIFIED: ICD-10-CM

## 2018-03-26 DIAGNOSIS — R25.1 TREMOR, UNSPECIFIED: ICD-10-CM

## 2018-03-26 DIAGNOSIS — I34.0 NONRHEUMATIC MITRAL (VALVE) INSUFFICIENCY: ICD-10-CM

## 2018-03-26 DIAGNOSIS — R51 HEADACHE: ICD-10-CM

## 2018-03-26 DIAGNOSIS — I36.1 NONRHEUMATIC TRICUSPID (VALVE) INSUFFICIENCY: ICD-10-CM

## 2018-03-26 DIAGNOSIS — R07.9 CHEST PAIN, UNSPECIFIED: ICD-10-CM

## 2018-03-26 DIAGNOSIS — Z96.41 PRESENCE OF INSULIN PUMP (EXTERNAL) (INTERNAL): ICD-10-CM

## 2018-03-26 DIAGNOSIS — K21.9 GASTRO-ESOPHAGEAL REFLUX DISEASE WITHOUT ESOPHAGITIS: ICD-10-CM

## 2018-03-26 DIAGNOSIS — Z56.0 UNEMPLOYMENT, UNSPECIFIED: ICD-10-CM

## 2018-03-26 DIAGNOSIS — I10 ESSENTIAL (PRIMARY) HYPERTENSION: ICD-10-CM

## 2018-03-26 DIAGNOSIS — I37.1 NONRHEUMATIC PULMONARY VALVE INSUFFICIENCY: ICD-10-CM

## 2018-03-26 DIAGNOSIS — Z87.440 PERSONAL HISTORY OF URINARY (TRACT) INFECTIONS: ICD-10-CM

## 2018-03-26 DIAGNOSIS — M51.34 OTHER INTERVERTEBRAL DISC DEGENERATION, THORACIC REGION: ICD-10-CM

## 2018-03-26 PROBLEM — F32.5 MAJOR DEPRESSIVE DISORDER, SINGLE EPISODE, IN FULL REMISSION: Chronic | Status: ACTIVE | Noted: 2018-03-20

## 2018-03-26 PROBLEM — N39.0 URINARY TRACT INFECTION, SITE NOT SPECIFIED: Chronic | Status: ACTIVE | Noted: 2018-03-20

## 2018-03-26 SDOH — ECONOMIC STABILITY - INCOME SECURITY: UNEMPLOYMENT, UNSPECIFIED: Z56.0

## 2018-03-29 ENCOUNTER — APPOINTMENT (OUTPATIENT)
Dept: INTERNAL MEDICINE | Facility: CLINIC | Age: 33
End: 2018-03-29

## 2018-03-29 ENCOUNTER — EMERGENCY (EMERGENCY)
Facility: HOSPITAL | Age: 33
LOS: 0 days | Discharge: HOME | End: 2018-03-29
Attending: EMERGENCY MEDICINE

## 2018-03-29 VITALS
RESPIRATION RATE: 18 BRPM | OXYGEN SATURATION: 98 % | SYSTOLIC BLOOD PRESSURE: 136 MMHG | DIASTOLIC BLOOD PRESSURE: 82 MMHG | TEMPERATURE: 99 F | HEART RATE: 109 BPM

## 2018-03-29 VITALS
TEMPERATURE: 97 F | OXYGEN SATURATION: 99 % | RESPIRATION RATE: 18 BRPM | SYSTOLIC BLOOD PRESSURE: 121 MMHG | DIASTOLIC BLOOD PRESSURE: 82 MMHG | HEART RATE: 108 BPM

## 2018-03-29 NOTE — ED PROVIDER NOTE - PHYSICAL EXAMINATION
CONSTITUTIONAL: Well-developed; well-nourished; in no acute distress.   SKIN: warm, dry  HEAD: Normocephalic; atraumatic.  EYES: no conj injection  ENT: No nasal discharge; airway clear.  CARD: S1, S2 normal  RESP: clear to auscultation bilaterally.   BREAST:  left breast with normal skin.  no tenderness no palpable masses.  no nipple discharge.   ABD: soft ntnd.  no rebound or guarding  EXT: Normal ROM.  5/5 strength bilaterally. normal gait. 2+ radial pulses bilaterally.  no left arm edema.   LYMPH: No axillary lymphadenopathy  NEURO: alert, oriented. nonfocal exam

## 2018-03-29 NOTE — ED ADULT NURSE NOTE - OBJECTIVE STATEMENT
pt presents with left breast pain started 2 weeks ago and generalized abd pain . pt states last BM was on Friday. pt has used laxatives for the first time last night with no BM.  pt denies chest pain , SOB or fever at home

## 2018-03-29 NOTE — ED PROVIDER NOTE - NS ED ROS FT
Eyes:  no visual changes.    Cardiac:  No chest pain or sob.   Respiratory:  No cough or respiratory distress  Breast: left breast burning sensation.  no skin changes  GI:  No nausea, vomiting, or diarrhea. (+) constipation.   :  No dysuria, frequency or burning.  MS:  no myalgias. no back pain.   Neuro:  No headache or weakness.   Skin:  no skin rash.   Endocrine: (+) diabetes

## 2018-03-29 NOTE — ED PROVIDER NOTE - MEDICAL DECISION MAKING DETAILS
1. constipation - recommend miralax. Will f/u with PMRIK Black.  2. Breast tingling - will refer to breast clinic for further evaluation.  3. Cardiac - Will f/u with DR. Hopper for stress echo.

## 2018-03-29 NOTE — ED PROVIDER NOTE - PROGRESS NOTE DETAILS
ATTENDING NOTE: 33 y/o female with hx of DM, constipation. c/o pins and needles sensation to left breast. No nipple discharge. No fever. No redness. No chest pain, no SOB. Different than the chest pain symptoms she had on previous visit. Also reports constipation. Last BM 6 days ago. No vomiting. Not on opioids. Took bisacodyl with no improvement.   Seen in ED for chest pain. Had inconclusive stress test. Was unable to have CCTA because they were unable to get her heart rate low enough. Scheduled for stress echo with Dr. Hopper.  O/:E Non-toxic in appearance. No pallor, no jaundice. Lungs CTA b/l. Breast with no mass, no erythema, no cellulitis, no abscess, no skin changes, no nipple discharge. S1 S2 regular, no murmur. ABD soft, no tenderness. Rectal: No fecal impaction. No pedal edema, no calf tenderness. No skin rash. No neurologic deficits. A/P: 1. constipation - recommend miralax. Will f/u with PMRIK Black.  2. Breast tingling - will refer to breast clinic for further evaluation.  3. Cardiac - Will f/u with DR. Hopper for stress echo.

## 2018-03-29 NOTE — ED PROVIDER NOTE - OBJECTIVE STATEMENT
32 F pmh gerd, major depressive disorder, dm presents for left sided breast pain. reports burning sensation within the breast intermittently over the past month.  no nipple discharge. no skin changes. no breast trauma. denies chest pain. no sob. patient reports she was in the hospital recently for chest pain workup. inconclusive stress test. plan for stress echo outpatient. no nausea/vomiting. patient also complaining of constipation.  patient reports history of constipation.  reports she attempted bisacodyl last night without improvement. last normal bm 3 days ago.  patient denies urinary symptoms.  no fever/chills

## 2018-04-08 ENCOUNTER — TRANSCRIPTION ENCOUNTER (OUTPATIENT)
Age: 33
End: 2018-04-08

## 2018-04-09 ENCOUNTER — APPOINTMENT (OUTPATIENT)
Dept: PODIATRY | Facility: CLINIC | Age: 33
End: 2018-04-09

## 2018-04-09 ENCOUNTER — RX RENEWAL (OUTPATIENT)
Age: 33
End: 2018-04-09

## 2018-04-13 ENCOUNTER — TRANSCRIPTION ENCOUNTER (OUTPATIENT)
Age: 33
End: 2018-04-13

## 2018-04-16 ENCOUNTER — APPOINTMENT (OUTPATIENT)
Dept: OTOLARYNGOLOGY | Facility: CLINIC | Age: 33
End: 2018-04-16
Payer: MEDICAID

## 2018-04-16 VITALS
WEIGHT: 168 LBS | BODY MASS INDEX: 28.68 KG/M2 | HEIGHT: 64 IN | DIASTOLIC BLOOD PRESSURE: 70 MMHG | SYSTOLIC BLOOD PRESSURE: 112 MMHG

## 2018-04-16 PROCEDURE — 99213 OFFICE O/P EST LOW 20 MIN: CPT

## 2018-04-17 ENCOUNTER — OUTPATIENT (OUTPATIENT)
Dept: OUTPATIENT SERVICES | Facility: HOSPITAL | Age: 33
LOS: 1 days | Discharge: HOME | End: 2018-04-17

## 2018-04-17 DIAGNOSIS — N64.4 MASTODYNIA: ICD-10-CM

## 2018-04-18 ENCOUNTER — APPOINTMENT (OUTPATIENT)
Dept: OTOLARYNGOLOGY | Facility: CLINIC | Age: 33
End: 2018-04-18

## 2018-04-18 ENCOUNTER — OUTPATIENT (OUTPATIENT)
Dept: OUTPATIENT SERVICES | Facility: HOSPITAL | Age: 33
LOS: 1 days | Discharge: HOME | End: 2018-04-18

## 2018-04-18 DIAGNOSIS — K21.9 GASTRO-ESOPHAGEAL REFLUX DISEASE WITHOUT ESOPHAGITIS: ICD-10-CM

## 2018-04-25 ENCOUNTER — LABORATORY RESULT (OUTPATIENT)
Age: 33
End: 2018-04-25

## 2018-04-25 ENCOUNTER — APPOINTMENT (OUTPATIENT)
Dept: OTOLARYNGOLOGY | Facility: CLINIC | Age: 33
End: 2018-04-25
Payer: MEDICAID

## 2018-04-25 VITALS
HEIGHT: 64 IN | SYSTOLIC BLOOD PRESSURE: 123 MMHG | BODY MASS INDEX: 28.68 KG/M2 | DIASTOLIC BLOOD PRESSURE: 89 MMHG | WEIGHT: 168 LBS

## 2018-04-25 PROCEDURE — 99213 OFFICE O/P EST LOW 20 MIN: CPT

## 2018-04-26 ENCOUNTER — TRANSCRIPTION ENCOUNTER (OUTPATIENT)
Age: 33
End: 2018-04-26

## 2018-04-27 ENCOUNTER — RX RENEWAL (OUTPATIENT)
Age: 33
End: 2018-04-27

## 2018-04-30 LAB
T3FREE SERPL-MCNC: 3.21 PG/ML
T4 FREE SERPL-MCNC: 1.2 NG/DL
TSH SERPL-ACNC: 2.37 UIU/ML

## 2018-05-03 ENCOUNTER — APPOINTMENT (OUTPATIENT)
Dept: UROLOGY | Facility: CLINIC | Age: 33
End: 2018-05-03
Payer: MEDICAID

## 2018-05-03 ENCOUNTER — OUTPATIENT (OUTPATIENT)
Dept: OUTPATIENT SERVICES | Facility: HOSPITAL | Age: 33
LOS: 1 days | Discharge: HOME | End: 2018-05-03

## 2018-05-03 ENCOUNTER — LABORATORY RESULT (OUTPATIENT)
Age: 33
End: 2018-05-03

## 2018-05-03 VITALS
BODY MASS INDEX: 28.34 KG/M2 | HEART RATE: 103 BPM | SYSTOLIC BLOOD PRESSURE: 106 MMHG | DIASTOLIC BLOOD PRESSURE: 70 MMHG | HEIGHT: 64 IN | WEIGHT: 166 LBS

## 2018-05-03 PROCEDURE — 99213 OFFICE O/P EST LOW 20 MIN: CPT

## 2018-05-04 ENCOUNTER — OUTPATIENT (OUTPATIENT)
Dept: OUTPATIENT SERVICES | Facility: HOSPITAL | Age: 33
LOS: 1 days | Discharge: HOME | End: 2018-05-04

## 2018-05-04 ENCOUNTER — APPOINTMENT (OUTPATIENT)
Dept: ENDOCRINOLOGY | Facility: CLINIC | Age: 33
End: 2018-05-04

## 2018-05-04 VITALS
WEIGHT: 168 LBS | SYSTOLIC BLOOD PRESSURE: 127 MMHG | HEART RATE: 90 BPM | DIASTOLIC BLOOD PRESSURE: 75 MMHG | HEIGHT: 64 IN | BODY MASS INDEX: 28.68 KG/M2

## 2018-05-04 DIAGNOSIS — E11.42 TYPE 2 DIABETES MELLITUS WITH DIABETIC POLYNEUROPATHY: ICD-10-CM

## 2018-05-04 DIAGNOSIS — E10.9 TYPE 1 DIABETES MELLITUS WITHOUT COMPLICATIONS: ICD-10-CM

## 2018-05-11 RX ORDER — SULFAMETHOXAZOLE AND TRIMETHOPRIM 800; 160 MG/1; MG/1
800-160 TABLET ORAL
Qty: 10 | Refills: 0 | Status: DISCONTINUED | COMMUNITY
Start: 2018-05-10 | End: 2018-05-11

## 2018-05-21 ENCOUNTER — TRANSCRIPTION ENCOUNTER (OUTPATIENT)
Age: 33
End: 2018-05-21

## 2018-05-21 LAB
CHOLEST SERPL-MCNC: 148 MG/DL
CHOLEST/HDLC SERPL: 3.2 RATIO
HDLC SERPL-MCNC: 46 MG/DL
LDLC SERPL CALC-MCNC: 82 MG/DL
TRIGL SERPL-MCNC: 97 MG/DL

## 2018-05-29 ENCOUNTER — OTHER (OUTPATIENT)
Age: 33
End: 2018-05-29

## 2018-05-30 ENCOUNTER — EMERGENCY (EMERGENCY)
Facility: HOSPITAL | Age: 33
LOS: 0 days | Discharge: HOME | End: 2018-05-30
Attending: EMERGENCY MEDICINE | Admitting: EMERGENCY MEDICINE

## 2018-05-30 VITALS
DIASTOLIC BLOOD PRESSURE: 83 MMHG | TEMPERATURE: 98 F | OXYGEN SATURATION: 100 % | SYSTOLIC BLOOD PRESSURE: 135 MMHG | HEART RATE: 100 BPM | RESPIRATION RATE: 18 BRPM

## 2018-05-30 VITALS
TEMPERATURE: 98 F | OXYGEN SATURATION: 100 % | RESPIRATION RATE: 18 BRPM | DIASTOLIC BLOOD PRESSURE: 87 MMHG | SYSTOLIC BLOOD PRESSURE: 153 MMHG | HEART RATE: 95 BPM

## 2018-05-30 DIAGNOSIS — R10.13 EPIGASTRIC PAIN: ICD-10-CM

## 2018-05-30 DIAGNOSIS — K21.9 GASTRO-ESOPHAGEAL REFLUX DISEASE WITHOUT ESOPHAGITIS: ICD-10-CM

## 2018-05-30 DIAGNOSIS — R10.9 UNSPECIFIED ABDOMINAL PAIN: ICD-10-CM

## 2018-05-30 DIAGNOSIS — E10.9 TYPE 1 DIABETES MELLITUS WITHOUT COMPLICATIONS: ICD-10-CM

## 2018-05-30 DIAGNOSIS — R19.7 DIARRHEA, UNSPECIFIED: ICD-10-CM

## 2018-05-30 DIAGNOSIS — Z88.1 ALLERGY STATUS TO OTHER ANTIBIOTIC AGENTS STATUS: ICD-10-CM

## 2018-05-30 DIAGNOSIS — Z88.8 ALLERGY STATUS TO OTHER DRUGS, MEDICAMENTS AND BIOLOGICAL SUBSTANCES: ICD-10-CM

## 2018-05-30 DIAGNOSIS — Z79.899 OTHER LONG TERM (CURRENT) DRUG THERAPY: ICD-10-CM

## 2018-05-30 DIAGNOSIS — Z88.0 ALLERGY STATUS TO PENICILLIN: ICD-10-CM

## 2018-05-30 DIAGNOSIS — R11.2 NAUSEA WITH VOMITING, UNSPECIFIED: ICD-10-CM

## 2018-05-30 LAB
ALBUMIN SERPL ELPH-MCNC: 4.3 G/DL — SIGNIFICANT CHANGE UP (ref 3.5–5.2)
ALP SERPL-CCNC: 95 U/L — SIGNIFICANT CHANGE UP (ref 30–115)
ALT FLD-CCNC: 21 U/L — SIGNIFICANT CHANGE UP (ref 0–41)
ANION GAP SERPL CALC-SCNC: 16 MMOL/L — HIGH (ref 7–14)
APPEARANCE UR: (no result)
AST SERPL-CCNC: 20 U/L — SIGNIFICANT CHANGE UP (ref 0–41)
BACTERIA # UR AUTO: (no result) /HPF
BASE EXCESS BLDV CALC-SCNC: 3.7 MMOL/L — HIGH (ref -2–2)
BILIRUB DIRECT SERPL-MCNC: <0.2 MG/DL — SIGNIFICANT CHANGE UP (ref 0–0.2)
BILIRUB INDIRECT FLD-MCNC: >0.1 MG/DL — LOW (ref 0.2–1.2)
BILIRUB SERPL-MCNC: 0.3 MG/DL — SIGNIFICANT CHANGE UP (ref 0.2–1.2)
BILIRUB UR-MCNC: NEGATIVE — SIGNIFICANT CHANGE UP
BUN SERPL-MCNC: 11 MG/DL — SIGNIFICANT CHANGE UP (ref 10–20)
CA-I SERPL-SCNC: 1.2 MMOL/L — SIGNIFICANT CHANGE UP (ref 1.12–1.3)
CALCIUM SERPL-MCNC: 9.4 MG/DL — SIGNIFICANT CHANGE UP (ref 8.5–10.1)
CHLORIDE SERPL-SCNC: 95 MMOL/L — LOW (ref 98–110)
CO2 SERPL-SCNC: 24 MMOL/L — SIGNIFICANT CHANGE UP (ref 17–32)
COLOR SPEC: YELLOW — SIGNIFICANT CHANGE UP
CREAT SERPL-MCNC: 0.6 MG/DL — LOW (ref 0.7–1.5)
DIFF PNL FLD: NEGATIVE — SIGNIFICANT CHANGE UP
EPI CELLS # UR: (no result) /HPF
GAS PNL BLDV: 136 MMOL/L — SIGNIFICANT CHANGE UP (ref 136–145)
GAS PNL BLDV: SIGNIFICANT CHANGE UP
GLUCOSE SERPL-MCNC: 233 MG/DL — HIGH (ref 70–99)
GLUCOSE UR QL: 500 MG/DL
HCO3 BLDV-SCNC: 29 MMOL/L — SIGNIFICANT CHANGE UP (ref 22–29)
HCT VFR BLD CALC: 41.8 % — SIGNIFICANT CHANGE UP (ref 37–47)
HCT VFR BLDA CALC: 46 % — HIGH (ref 34–44)
HGB BLD CALC-MCNC: 15 G/DL — SIGNIFICANT CHANGE UP (ref 14–18)
HGB BLD-MCNC: 14.3 G/DL — SIGNIFICANT CHANGE UP (ref 12–16)
KETONES UR-MCNC: NEGATIVE — SIGNIFICANT CHANGE UP
LACTATE BLDV-MCNC: 0.9 MMOL/L — SIGNIFICANT CHANGE UP (ref 0.5–1.6)
LEUKOCYTE ESTERASE UR-ACNC: NEGATIVE — SIGNIFICANT CHANGE UP
MCHC RBC-ENTMCNC: 30.3 PG — SIGNIFICANT CHANGE UP (ref 27–31)
MCHC RBC-ENTMCNC: 34.2 G/DL — SIGNIFICANT CHANGE UP (ref 32–37)
MCV RBC AUTO: 88.6 FL — SIGNIFICANT CHANGE UP (ref 81–99)
NITRITE UR-MCNC: NEGATIVE — SIGNIFICANT CHANGE UP
NRBC # BLD: 0 /100 WBCS — SIGNIFICANT CHANGE UP (ref 0–0)
PCO2 BLDV: 47 MMHG — SIGNIFICANT CHANGE UP (ref 41–51)
PH BLDV: 7.4 — SIGNIFICANT CHANGE UP (ref 7.26–7.43)
PH UR: 7.5 — SIGNIFICANT CHANGE UP (ref 5–8)
PLATELET # BLD AUTO: 443 K/UL — HIGH (ref 130–400)
PO2 BLDV: 42 MMHG — HIGH (ref 20–40)
POTASSIUM BLDV-SCNC: 4.1 MMOL/L — SIGNIFICANT CHANGE UP (ref 3.3–5.6)
POTASSIUM SERPL-MCNC: 4.8 MMOL/L — SIGNIFICANT CHANGE UP (ref 3.5–5)
POTASSIUM SERPL-SCNC: 4.8 MMOL/L — SIGNIFICANT CHANGE UP (ref 3.5–5)
PROT SERPL-MCNC: 8 G/DL — SIGNIFICANT CHANGE UP (ref 6–8)
PROT UR-MCNC: 30
RBC # BLD: 4.72 M/UL — SIGNIFICANT CHANGE UP (ref 4.2–5.4)
RBC # FLD: 12.2 % — SIGNIFICANT CHANGE UP (ref 11.5–14.5)
SAO2 % BLDV: 77 % — SIGNIFICANT CHANGE UP
SODIUM SERPL-SCNC: 135 MMOL/L — SIGNIFICANT CHANGE UP (ref 135–146)
SP GR SPEC: >=1.03 — SIGNIFICANT CHANGE UP (ref 1.01–1.03)
UROBILINOGEN FLD QL: 0.2 — SIGNIFICANT CHANGE UP (ref 0.2–0.2)
WBC # BLD: 17.88 K/UL — HIGH (ref 4.8–10.8)
WBC # FLD AUTO: 17.88 K/UL — HIGH (ref 4.8–10.8)

## 2018-05-30 RX ORDER — METOCLOPRAMIDE HCL 10 MG
10 TABLET ORAL ONCE
Qty: 0 | Refills: 0 | Status: COMPLETED | OUTPATIENT
Start: 2018-05-30 | End: 2018-05-30

## 2018-05-30 RX ORDER — ONDANSETRON 8 MG/1
1 TABLET, FILM COATED ORAL
Qty: 21 | Refills: 0
Start: 2018-05-30

## 2018-05-30 RX ORDER — ACETAMINOPHEN 500 MG
650 TABLET ORAL ONCE
Qty: 0 | Refills: 0 | Status: COMPLETED | OUTPATIENT
Start: 2018-05-30 | End: 2018-05-30

## 2018-05-30 RX ORDER — SODIUM CHLORIDE 9 MG/ML
1000 INJECTION INTRAMUSCULAR; INTRAVENOUS; SUBCUTANEOUS ONCE
Qty: 0 | Refills: 0 | Status: COMPLETED | OUTPATIENT
Start: 2018-05-30 | End: 2018-05-30

## 2018-05-30 RX ORDER — DIATRIZOATE MEGLUMINE 180 MG/ML
20 INJECTION, SOLUTION INTRAVESICAL ONCE
Qty: 0 | Refills: 0 | Status: COMPLETED | OUTPATIENT
Start: 2018-05-30 | End: 2018-05-30

## 2018-05-30 RX ORDER — METOCLOPRAMIDE HCL 10 MG
10 TABLET ORAL ONCE
Qty: 0 | Refills: 0 | Status: DISCONTINUED | OUTPATIENT
Start: 2018-05-30 | End: 2018-05-30

## 2018-05-30 RX ORDER — KETOROLAC TROMETHAMINE 30 MG/ML
15 SYRINGE (ML) INJECTION ONCE
Qty: 0 | Refills: 0 | Status: DISCONTINUED | OUTPATIENT
Start: 2018-05-30 | End: 2018-05-30

## 2018-05-30 RX ADMIN — SODIUM CHLORIDE 1000 MILLILITER(S): 9 INJECTION INTRAMUSCULAR; INTRAVENOUS; SUBCUTANEOUS at 16:32

## 2018-05-30 RX ADMIN — SODIUM CHLORIDE 1000 MILLILITER(S): 9 INJECTION INTRAMUSCULAR; INTRAVENOUS; SUBCUTANEOUS at 14:46

## 2018-05-30 RX ADMIN — DIATRIZOATE MEGLUMINE 20 MILLILITER(S): 180 INJECTION, SOLUTION INTRAVESICAL at 16:50

## 2018-05-30 RX ADMIN — Medication 15 MILLIGRAM(S): at 20:27

## 2018-05-30 RX ADMIN — Medication 650 MILLIGRAM(S): at 17:41

## 2018-05-30 RX ADMIN — Medication 15 MILLIGRAM(S): at 21:03

## 2018-05-30 RX ADMIN — Medication 10 MILLIGRAM(S): at 20:28

## 2018-05-30 NOTE — ED PROVIDER NOTE - OBJECTIVE STATEMENT
This is a 32yo female with history of type 1 diabetes, diabetic neuropathy, GERD, anxiety who is presenting today with nausea and vomiting, and 3 episodes of diarrhea after having been constipated for 3 days and taking miralax this morning. She states that she is having abdominal pain along with the nausea/vomiting/diarrrhea. She states her vomit is food particles, brown/yellow in color. She reports that she hasn't been able to keep any food or liquid down since the nausea and vomiting started. She states she has left breast pain that she has been seen in the ED for in the past and is to schedule an appointment with breast surgeon. She denies any other acute complaints at this time.

## 2018-05-30 NOTE — ED PROVIDER NOTE - ATTENDING CONTRIBUTION TO CARE
I personally evaluated the patient. I reviewed the Resident’s or Physician Assistant’s note (as assigned above), and agree with the findings and plan except as documented in my note.     33 female here for evaluation of abdominal pain with constipation, subsequent intractable vomiting and nausea. No fever or chills.     PE: female in no distress. HEENT: non icteric. normal mucosa. CHEST: normal work of breathing. CV: pulses intact ABD: soft, non rigid, no guarding. SKIN: no pallor    Impression: abdominal pain    Plan: IV labs imaging supportive care and reevaluation

## 2018-05-30 NOTE — ED ADULT NURSE NOTE - CHIEF COMPLAINT QUOTE
Pt BIBA from home c/o abd pain, constipated x 3 days. Took Miralax before coming to ED, +BM & vomited x1 on arrival

## 2018-05-30 NOTE — ED PROVIDER NOTE - PROGRESS NOTE DETAILS
ADDENDUM by Amna Corbett M.D.: I received sign-off from Dr. DINESH Concepcion. 33yoM with h/o DM p/w abdominal pain, nausea/vomiting, labs remarkable for leukocytosis, I was to f/u CT, anticipated discharge. On my exam, pt well appearing, no more vomiting since presentation and reports mild persisting abdominal discomfort, abdomen entirely NT and benign. Given another 1L NS and Tylenol, awaiting CT. Patient began having more diarrhea in the ED, no more emesis. Given Toradol and Reglan and NS. CT unremarkable. Following this, well appearing, symptoms resolved. Hemodynamically stable, tolerating PO, pt and fam comfortable with discharge. Discharged with rx for Zofran, hydration, PMD f/u, and return precautions.

## 2018-05-30 NOTE — ED ADULT NURSE NOTE - OBJECTIVE STATEMENT
Pt presents c/o abd pain. Pt reports she was constipated and took a miralax and has been having diarrhea and vomiting. Pt states she had 5 episodes of vomiting PTA and 1 episode in ED. No fever. Pt reports mild relief of pain

## 2018-06-04 ENCOUNTER — APPOINTMENT (OUTPATIENT)
Dept: OTOLARYNGOLOGY | Facility: CLINIC | Age: 33
End: 2018-06-04
Payer: MEDICAID

## 2018-06-04 VITALS
SYSTOLIC BLOOD PRESSURE: 125 MMHG | WEIGHT: 170 LBS | HEIGHT: 64 IN | BODY MASS INDEX: 29.02 KG/M2 | DIASTOLIC BLOOD PRESSURE: 74 MMHG

## 2018-06-04 PROCEDURE — 99214 OFFICE O/P EST MOD 30 MIN: CPT

## 2018-06-05 ENCOUNTER — APPOINTMENT (OUTPATIENT)
Dept: SURGERY | Facility: CLINIC | Age: 33
End: 2018-06-05
Payer: MEDICAID

## 2018-06-05 VITALS — OXYGEN SATURATION: 98 % | HEIGHT: 64 IN | WEIGHT: 166 LBS | BODY MASS INDEX: 28.34 KG/M2 | HEART RATE: 104 BPM

## 2018-06-05 PROCEDURE — 99203 OFFICE O/P NEW LOW 30 MIN: CPT

## 2018-06-05 RX ORDER — INSULIN LISPRO 100 [IU]/ML
100 INJECTION, SOLUTION INTRAVENOUS; SUBCUTANEOUS
Qty: 3 | Refills: 5 | Status: DISCONTINUED | COMMUNITY
Start: 2018-04-27 | End: 2018-06-05

## 2018-06-05 RX ORDER — GLUCAGON 1 MG
1 KIT INJECTION
Qty: 1 | Refills: 0 | Status: DISCONTINUED | COMMUNITY
Start: 2018-05-04 | End: 2018-06-05

## 2018-06-05 RX ORDER — NITROFURANTOIN MACROCRYSTALS 100 MG/1
100 CAPSULE ORAL
Qty: 10 | Refills: 0 | Status: DISCONTINUED | COMMUNITY
Start: 2018-05-11 | End: 2018-06-05

## 2018-06-07 ENCOUNTER — OUTPATIENT (OUTPATIENT)
Dept: OUTPATIENT SERVICES | Facility: HOSPITAL | Age: 33
LOS: 1 days | Discharge: HOME | End: 2018-06-07

## 2018-06-07 ENCOUNTER — APPOINTMENT (OUTPATIENT)
Dept: UROLOGY | Facility: CLINIC | Age: 33
End: 2018-06-07
Payer: MEDICAID

## 2018-06-07 VITALS
SYSTOLIC BLOOD PRESSURE: 129 MMHG | BODY MASS INDEX: 28.34 KG/M2 | HEIGHT: 64 IN | WEIGHT: 166 LBS | HEART RATE: 103 BPM | DIASTOLIC BLOOD PRESSURE: 83 MMHG

## 2018-06-07 LAB — BACTERIA UR CULT: ABNORMAL

## 2018-06-07 PROCEDURE — 99213 OFFICE O/P EST LOW 20 MIN: CPT

## 2018-06-08 ENCOUNTER — MEDICATION RENEWAL (OUTPATIENT)
Age: 33
End: 2018-06-08

## 2018-06-12 ENCOUNTER — APPOINTMENT (OUTPATIENT)
Dept: INTERNAL MEDICINE | Facility: CLINIC | Age: 33
End: 2018-06-12

## 2018-06-14 DIAGNOSIS — N39.0 URINARY TRACT INFECTION, SITE NOT SPECIFIED: ICD-10-CM

## 2018-06-15 ENCOUNTER — RX RENEWAL (OUTPATIENT)
Age: 33
End: 2018-06-15

## 2018-06-19 ENCOUNTER — OTHER (OUTPATIENT)
Age: 33
End: 2018-06-19

## 2018-06-20 ENCOUNTER — OUTPATIENT (OUTPATIENT)
Dept: OUTPATIENT SERVICES | Facility: HOSPITAL | Age: 33
LOS: 1 days | Discharge: HOME | End: 2018-06-20

## 2018-06-20 ENCOUNTER — APPOINTMENT (OUTPATIENT)
Dept: INTERNAL MEDICINE | Facility: CLINIC | Age: 33
End: 2018-06-20

## 2018-06-20 VITALS
HEIGHT: 64 IN | HEART RATE: 102 BPM | DIASTOLIC BLOOD PRESSURE: 80 MMHG | SYSTOLIC BLOOD PRESSURE: 119 MMHG | WEIGHT: 165 LBS | BODY MASS INDEX: 28.17 KG/M2

## 2018-06-20 DIAGNOSIS — N39.0 URINARY TRACT INFECTION, SITE NOT SPECIFIED: ICD-10-CM

## 2018-06-20 DIAGNOSIS — R10.2 PELVIC AND PERINEAL PAIN: ICD-10-CM

## 2018-06-20 DIAGNOSIS — E10.9 TYPE 1 DIABETES MELLITUS WITHOUT COMPLICATIONS: ICD-10-CM

## 2018-06-20 LAB
CREAT SPEC-SCNC: 86 MG/DL
HBA1C MFR BLD HPLC: 7.6 %
MICROALBUMIN 24H UR DL<=1MG/L-MCNC: <0.3 MG/DL
MICROALBUMIN/CREAT 24H UR-RTO: NORMAL

## 2018-06-20 RX ORDER — NITROFURANTOIN (MONOHYDRATE/MACROCRYSTALS) 25; 75 MG/1; MG/1
100 CAPSULE ORAL
Qty: 14 | Refills: 0 | Status: DISCONTINUED | COMMUNITY
Start: 2018-06-07 | End: 2018-06-20

## 2018-06-20 NOTE — PHYSICAL EXAM
[No Acute Distress] : no acute distress [Normal Sclera/Conjunctiva] : normal sclera/conjunctiva [Normal Oropharynx] : the oropharynx was normal [Supple] : supple [Clear to Auscultation] : lungs were clear to auscultation bilaterally [No Accessory Muscle Use] : no accessory muscle use [Normal Rate] : normal rate  [Regular Rhythm] : with a regular rhythm [Normal S1, S2] : normal S1 and S2 [No Edema] : there was no peripheral edema [No CVA Tenderness] : no CVA  tenderness [Normal Gait] : normal gait [Coordination Grossly Intact] : coordination grossly intact [No Focal Deficits] : no focal deficits [Normal Affect] : the affect was normal [Normal Insight/Judgement] : insight and judgment were intact

## 2018-06-20 NOTE — HISTORY OF PRESENT ILLNESS
[de-identified] : 33 yo lady with pmhx of dm, reflux disease, atypical chest pain, diabetic neuropathy, recurrent UTI, neck pain, left breast pain came in here for regular follow up and c/o pain in her left ear and blockage of left ear since 3 months followed up by ENT no intervention was done. Denies any hearing loss, ear discharge, tinnitus Upper resp symptoms

## 2018-06-20 NOTE — ASSESSMENT
[FreeTextEntry1] : 33 yo lady with pmhx of dm came here for follow up was last seen in March 2018.\par \par #)DM\par cqN5o-4.6 followed up with endo in may.\par c/w insulin pump.\par \par #)Atypical chest pain/Tachycardia followed up with Dr. Christian echo stress test was done normal.\par Follow up with cardiology.\par c/w metoprolol.\par \par #)Recurrent UTI with positive cultures for Enterococcus faecalis([id number])\par Started on macrobid 100 BID completed on June 14 and repeat urine cultures showed Enterococcus faecalis([id number]).\par Follow up with urology decision about IV Abx.\par \par #)Vulvodynia\par following up with pain management she was scheduled for pudendal nerve block on June 14 but postponed for UTI. Needs clearance from urology for procedure.\par \par #)Left breast pain followed up with surgery Dr. Pool, likely musculoskeletal/proliferative breast, given mammo and US normal recommended to see plastic surgeon if pain persists for reduction mammoplasty.\par \par #)Neck pain resolved.was seen by ENT neck US was done negative.\par  \par #)Ear pain and blockage of left ear no signs of infection.\par follow up with ENT\par \par #)Tremors and herniated disc following up with neurology she has an appointment in sept 14\par \par #)HCM:\par follow up in 3 months.

## 2018-06-20 NOTE — REVIEW OF SYSTEMS
[Palpitations] : palpitations [Negative] : Psychiatric [FreeTextEntry4] : ear pain and blockage of ear. [FreeTextEntry5] : intermittent palpitations [FreeTextEntry8] : vaginal discharge

## 2018-06-21 ENCOUNTER — RESULT REVIEW (OUTPATIENT)
Age: 33
End: 2018-06-21

## 2018-06-22 ENCOUNTER — RX RENEWAL (OUTPATIENT)
Age: 33
End: 2018-06-22

## 2018-06-22 LAB — BACTERIA UR CULT: ABNORMAL

## 2018-07-03 RX ORDER — INSULIN LISPRO 100 [IU]/ML
100 INJECTION, SOLUTION INTRAVENOUS; SUBCUTANEOUS
Qty: 1 | Refills: 5 | Status: COMPLETED | COMMUNITY
End: 2018-06-15

## 2018-07-03 RX ORDER — LANCETS 28 GAUGE
EACH MISCELLANEOUS
Qty: 300 | Refills: 5 | Status: COMPLETED | COMMUNITY
Start: 2017-08-04 | End: 2018-07-03

## 2018-07-05 ENCOUNTER — LABORATORY RESULT (OUTPATIENT)
Age: 33
End: 2018-07-05

## 2018-07-08 ENCOUNTER — TRANSCRIPTION ENCOUNTER (OUTPATIENT)
Age: 33
End: 2018-07-08

## 2018-07-16 ENCOUNTER — APPOINTMENT (OUTPATIENT)
Dept: OTOLARYNGOLOGY | Facility: CLINIC | Age: 33
End: 2018-07-16
Payer: MEDICAID

## 2018-07-16 VITALS
BODY MASS INDEX: 28.17 KG/M2 | WEIGHT: 165 LBS | SYSTOLIC BLOOD PRESSURE: 128 MMHG | DIASTOLIC BLOOD PRESSURE: 81 MMHG | HEIGHT: 64 IN

## 2018-07-16 PROCEDURE — 99213 OFFICE O/P EST LOW 20 MIN: CPT | Mod: 25

## 2018-07-16 PROCEDURE — 92557 COMPREHENSIVE HEARING TEST: CPT

## 2018-07-16 PROCEDURE — 92550 TYMPANOMETRY & REFLEX THRESH: CPT

## 2018-07-17 PROBLEM — N39.0 URINARY TRACT INFECTION, SITE NOT SPECIFIED: Chronic | Status: INACTIVE | Noted: 2018-02-26 | Resolved: 2018-03-21

## 2018-07-17 PROBLEM — R00.0 TACHYCARDIA, UNSPECIFIED: Chronic | Status: INACTIVE | Noted: 2018-03-10 | Resolved: 2018-03-21

## 2018-07-17 PROBLEM — G62.9 POLYNEUROPATHY, UNSPECIFIED: Chronic | Status: ACTIVE | Noted: 2018-02-20

## 2018-07-17 PROBLEM — E11.9 TYPE 2 DIABETES MELLITUS WITHOUT COMPLICATIONS: Chronic | Status: INACTIVE | Noted: 2018-02-20 | Resolved: 2018-03-09

## 2018-08-08 NOTE — ED PROVIDER NOTE - NS ED MD DISPO DISCHARGE CCDA
August 8, 2018      [facility]  [address] [address]  Phone: [phone]  Fax: [phone]       Patient: Hossein Price   YOB: 2004  Date of Visit: 08/08/2018    To Whom It May Concern:    DEWEY Price  was at [facility] on 08/08/2018. He has no activity restrictions and can participate in all sports. If you have any questions or concerns, or if I can be of further assistance, please do not hesitate to contact me.    Sincerely,        Jose Hernandez MD  Pediatric Cardiologist  Director of Pediatric Heart Transplant and Heart Failure  [facility]  [address] [place] [address]    Pager: [phone]        Patient/Caregiver provided printed discharge information.

## 2018-08-23 ENCOUNTER — RX RENEWAL (OUTPATIENT)
Age: 33
End: 2018-08-23

## 2018-08-24 ENCOUNTER — RX RENEWAL (OUTPATIENT)
Age: 33
End: 2018-08-24

## 2018-08-27 ENCOUNTER — OTHER (OUTPATIENT)
Age: 33
End: 2018-08-27

## 2018-08-31 LAB
ALBUMIN SERPL ELPH-MCNC: 4.3 G/DL
ALP BLD-CCNC: 88 U/L
ALT SERPL-CCNC: 23 U/L
ANION GAP SERPL CALC-SCNC: 9 MMOL/L
AST SERPL-CCNC: 14 U/L
BILIRUB SERPL-MCNC: 0.3 MG/DL
BUN SERPL-MCNC: 9 MG/DL
CALCIUM SERPL-MCNC: 9.3 MG/DL
CHLORIDE SERPL-SCNC: 96 MMOL/L
CHOLEST SERPL-MCNC: 162 MG/DL
CHOLEST/HDLC SERPL: 3.2 RATIO
CO2 SERPL-SCNC: 28 MMOL/L
CREAT SERPL-MCNC: 0.7 MG/DL
ESTIMATED AVERAGE GLUCOSE: 197 MG/DL
GLUCOSE SERPL-MCNC: 143 MG/DL
HBA1C MFR BLD HPLC: 8.5 %
HDLC SERPL-MCNC: 51 MG/DL
LDLC SERPL CALC-MCNC: 107 MG/DL
POTASSIUM SERPL-SCNC: 4.4 MMOL/L
PROT SERPL-MCNC: 7.6 G/DL
SODIUM SERPL-SCNC: 133 MMOL/L
TRIGL SERPL-MCNC: 71 MG/DL

## 2018-09-06 ENCOUNTER — EMERGENCY (EMERGENCY)
Facility: HOSPITAL | Age: 33
LOS: 0 days | Discharge: HOME | End: 2018-09-07
Attending: EMERGENCY MEDICINE | Admitting: EMERGENCY MEDICINE

## 2018-09-06 ENCOUNTER — OTHER (OUTPATIENT)
Age: 33
End: 2018-09-06

## 2018-09-06 VITALS
SYSTOLIC BLOOD PRESSURE: 141 MMHG | OXYGEN SATURATION: 100 % | DIASTOLIC BLOOD PRESSURE: 88 MMHG | TEMPERATURE: 98 F | HEART RATE: 117 BPM | RESPIRATION RATE: 20 BRPM

## 2018-09-06 DIAGNOSIS — Z88.8 ALLERGY STATUS TO OTHER DRUGS, MEDICAMENTS AND BIOLOGICAL SUBSTANCES: ICD-10-CM

## 2018-09-06 DIAGNOSIS — K21.9 GASTRO-ESOPHAGEAL REFLUX DISEASE WITHOUT ESOPHAGITIS: ICD-10-CM

## 2018-09-06 DIAGNOSIS — R07.89 OTHER CHEST PAIN: ICD-10-CM

## 2018-09-06 DIAGNOSIS — E03.9 HYPOTHYROIDISM, UNSPECIFIED: ICD-10-CM

## 2018-09-06 DIAGNOSIS — Z79.899 OTHER LONG TERM (CURRENT) DRUG THERAPY: ICD-10-CM

## 2018-09-06 DIAGNOSIS — Z88.7 ALLERGY STATUS TO SERUM AND VACCINE: ICD-10-CM

## 2018-09-06 DIAGNOSIS — R07.9 CHEST PAIN, UNSPECIFIED: ICD-10-CM

## 2018-09-06 DIAGNOSIS — Z88.1 ALLERGY STATUS TO OTHER ANTIBIOTIC AGENTS STATUS: ICD-10-CM

## 2018-09-06 DIAGNOSIS — E10.9 TYPE 1 DIABETES MELLITUS WITHOUT COMPLICATIONS: ICD-10-CM

## 2018-09-06 DIAGNOSIS — Z88.0 ALLERGY STATUS TO PENICILLIN: ICD-10-CM

## 2018-09-06 DIAGNOSIS — F32.9 MAJOR DEPRESSIVE DISORDER, SINGLE EPISODE, UNSPECIFIED: ICD-10-CM

## 2018-09-06 LAB
ALBUMIN SERPL ELPH-MCNC: 4.5 G/DL — SIGNIFICANT CHANGE UP (ref 3.5–5.2)
ALP SERPL-CCNC: 115 U/L — SIGNIFICANT CHANGE UP (ref 30–115)
ALT FLD-CCNC: 23 U/L — SIGNIFICANT CHANGE UP (ref 0–41)
ANION GAP SERPL CALC-SCNC: 14 MMOL/L — SIGNIFICANT CHANGE UP (ref 7–14)
AST SERPL-CCNC: 16 U/L — SIGNIFICANT CHANGE UP (ref 0–41)
BASOPHILS # BLD AUTO: 0.08 K/UL — SIGNIFICANT CHANGE UP (ref 0–0.2)
BASOPHILS NFR BLD AUTO: 0.7 % — SIGNIFICANT CHANGE UP (ref 0–1)
BILIRUB SERPL-MCNC: <0.2 MG/DL — SIGNIFICANT CHANGE UP (ref 0.2–1.2)
BUN SERPL-MCNC: 13 MG/DL — SIGNIFICANT CHANGE UP (ref 10–20)
CALCIUM SERPL-MCNC: 9.5 MG/DL — SIGNIFICANT CHANGE UP (ref 8.5–10.1)
CHLORIDE SERPL-SCNC: 96 MMOL/L — LOW (ref 98–110)
CO2 SERPL-SCNC: 25 MMOL/L — SIGNIFICANT CHANGE UP (ref 17–32)
CREAT SERPL-MCNC: 0.7 MG/DL — SIGNIFICANT CHANGE UP (ref 0.7–1.5)
EOSINOPHIL # BLD AUTO: 0.21 K/UL — SIGNIFICANT CHANGE UP (ref 0–0.7)
EOSINOPHIL NFR BLD AUTO: 1.9 % — SIGNIFICANT CHANGE UP (ref 0–8)
GLUCOSE SERPL-MCNC: 288 MG/DL — HIGH (ref 70–99)
HCT VFR BLD CALC: 39.7 % — SIGNIFICANT CHANGE UP (ref 37–47)
HGB BLD-MCNC: 13.6 G/DL — SIGNIFICANT CHANGE UP (ref 12–16)
IMM GRANULOCYTES NFR BLD AUTO: 0.4 % — HIGH (ref 0.1–0.3)
LACTATE SERPL-SCNC: 1.8 MMOL/L — SIGNIFICANT CHANGE UP (ref 0.5–2.2)
LYMPHOCYTES # BLD AUTO: 27.1 % — SIGNIFICANT CHANGE UP (ref 20.5–51.1)
LYMPHOCYTES # BLD AUTO: 3.03 K/UL — SIGNIFICANT CHANGE UP (ref 1.2–3.4)
MCHC RBC-ENTMCNC: 30.7 PG — SIGNIFICANT CHANGE UP (ref 27–31)
MCHC RBC-ENTMCNC: 34.3 G/DL — SIGNIFICANT CHANGE UP (ref 32–37)
MCV RBC AUTO: 89.6 FL — SIGNIFICANT CHANGE UP (ref 81–99)
MONOCYTES # BLD AUTO: 0.77 K/UL — HIGH (ref 0.1–0.6)
MONOCYTES NFR BLD AUTO: 6.9 % — SIGNIFICANT CHANGE UP (ref 1.7–9.3)
NEUTROPHILS # BLD AUTO: 7.03 K/UL — HIGH (ref 1.4–6.5)
NEUTROPHILS NFR BLD AUTO: 63 % — SIGNIFICANT CHANGE UP (ref 42.2–75.2)
NRBC # BLD: 0 /100 WBCS — SIGNIFICANT CHANGE UP (ref 0–0)
PLATELET # BLD AUTO: 355 K/UL — SIGNIFICANT CHANGE UP (ref 130–400)
POTASSIUM SERPL-MCNC: 4.6 MMOL/L — SIGNIFICANT CHANGE UP (ref 3.5–5)
POTASSIUM SERPL-SCNC: 4.6 MMOL/L — SIGNIFICANT CHANGE UP (ref 3.5–5)
PROT SERPL-MCNC: 7.9 G/DL — SIGNIFICANT CHANGE UP (ref 6–8)
RBC # BLD: 4.43 M/UL — SIGNIFICANT CHANGE UP (ref 4.2–5.4)
RBC # FLD: 12.1 % — SIGNIFICANT CHANGE UP (ref 11.5–14.5)
SODIUM SERPL-SCNC: 135 MMOL/L — SIGNIFICANT CHANGE UP (ref 135–146)
TROPONIN T SERPL-MCNC: <0.01 NG/ML — SIGNIFICANT CHANGE UP
WBC # BLD: 11.17 K/UL — HIGH (ref 4.8–10.8)
WBC # FLD AUTO: 11.17 K/UL — HIGH (ref 4.8–10.8)

## 2018-09-06 RX ORDER — SODIUM CHLORIDE 9 MG/ML
1000 INJECTION INTRAMUSCULAR; INTRAVENOUS; SUBCUTANEOUS ONCE
Qty: 0 | Refills: 0 | Status: COMPLETED | OUTPATIENT
Start: 2018-09-06 | End: 2018-09-06

## 2018-09-06 RX ADMIN — SODIUM CHLORIDE 2000 MILLILITER(S): 9 INJECTION INTRAMUSCULAR; INTRAVENOUS; SUBCUTANEOUS at 23:11

## 2018-09-06 NOTE — ED ADULT NURSE NOTE - OBJECTIVE STATEMENT
Pt presents with midsternal and breast pain x 6 months. Pt denies sob, chills, n/v, fever. Pt states pain worsens with moving.

## 2018-09-06 NOTE — ED ADULT NURSE NOTE - NSIMPLEMENTINTERV_GEN_ALL_ED
Implemented All Universal Safety Interventions:  [place] to call system. Call bell, personal items and telephone within reach. Instruct patient to call for assistance. Room bathroom lighting operational. Non-slip footwear when patient is off stretcher. Physically safe environment: no spills, clutter or unnecessary equipment. Stretcher in lowest position, wheels locked, appropriate side rails in place.

## 2018-09-07 ENCOUNTER — APPOINTMENT (OUTPATIENT)
Dept: ENDOCRINOLOGY | Facility: CLINIC | Age: 33
End: 2018-09-07

## 2018-09-07 VITALS
TEMPERATURE: 98 F | SYSTOLIC BLOOD PRESSURE: 133 MMHG | RESPIRATION RATE: 18 BRPM | OXYGEN SATURATION: 99 % | DIASTOLIC BLOOD PRESSURE: 78 MMHG | HEART RATE: 86 BPM

## 2018-09-07 PROBLEM — E10.9 TYPE 1 DIABETES MELLITUS WITHOUT COMPLICATIONS: Chronic | Status: ACTIVE | Noted: 2018-03-09

## 2018-09-07 PROBLEM — R25.1 TREMOR, UNSPECIFIED: Chronic | Status: ACTIVE | Noted: 2018-03-21

## 2018-09-07 RX ORDER — ACETAMINOPHEN 500 MG
975 TABLET ORAL ONCE
Qty: 0 | Refills: 0 | Status: COMPLETED | OUTPATIENT
Start: 2018-09-07 | End: 2018-09-07

## 2018-09-07 RX ADMIN — Medication 975 MILLIGRAM(S): at 00:49

## 2018-09-07 NOTE — ED PROVIDER NOTE - NS ED ROS FT
Constitutional:  no fevers, no chills, no malaise  Eyes:  No visual changes  ENMT: No neck pain or stiffness, no nasal congestion, no ear pain, no throat pain  Cardiac:  As per HPI  Respiratory:  No cough or sob  GI:  No nausea, vomiting, diarrhea or abdominal pain, + hx of GERD  :  No dysuria, frequency or burning.  MS:  No back pain, no joint pain.  Neuro:  No headache, no dizziness, no change in mental status  Skin:  No skin rash

## 2018-09-07 NOTE — ED PROVIDER NOTE - OBJECTIVE STATEMENT
33 yr F with hx of DMI, hypothyroidism, GERD, depression, UTIs presents with complaints of non radiating mid chest pain. Also reports left sided breast pain, not new. Pt with hx of chest pain and has had a neg stress test, sees cardiologist Dr. Hopper. Reports CP ongoing for the past 8 months but worsened yesterday. Denies associated SOB, no nausea, vomiting or dizziness. Pt has also had workup with Dr. Pool, breast surgeon and has had a negative mammogram.

## 2018-09-07 NOTE — ED PROVIDER NOTE - PHYSICAL EXAMINATION
CONSTITUTIONAL: Well-developed; well-nourished; in no acute distress, nontoxic appearing  SKIN: skin exam is warm and dry,  HEAD: Normocephalic; atraumatic.  EYES: PERRL, 3 mm bilateral, no nystagmus, EOM intact; conjunctiva and sclera clear.  ENT: MMM, no nasal congestion  NECK: Supple; non tender.+ full passive ROM in all directions. No JVD  CARD: S1, S2 normal, no murmur  RESP: No wheezes, rales or rhonchi. Good air movement bilaterally  CHEST: + reproducible smid chest ttp  ABD: soft; non-distended; non-tender. No Rebound, No guarding  EXT: Normal ROM. No cyanosis or edema. Dp Pulses intact.   NEURO: awake, alert, following commands, oriented, grossly unremarkable. No Focal deficits. GCS 15.   PSYCH: Cooperative, appropriate.

## 2018-09-07 NOTE — ED PROVIDER NOTE - MEDICAL DECISION MAKING DETAILS
Pt w/o findings in ED. Given anticipatory guidance and return precautions. Has f/up with Dr. Hopper in 1 wk

## 2018-09-13 ENCOUNTER — APPOINTMENT (OUTPATIENT)
Dept: UROLOGY | Facility: CLINIC | Age: 33
End: 2018-09-13
Payer: MEDICAID

## 2018-09-13 ENCOUNTER — OUTPATIENT (OUTPATIENT)
Dept: OUTPATIENT SERVICES | Facility: HOSPITAL | Age: 33
LOS: 1 days | Discharge: HOME | End: 2018-09-13

## 2018-09-13 VITALS
BODY MASS INDEX: 28.34 KG/M2 | SYSTOLIC BLOOD PRESSURE: 127 MMHG | WEIGHT: 166 LBS | DIASTOLIC BLOOD PRESSURE: 88 MMHG | HEIGHT: 64 IN

## 2018-09-13 PROCEDURE — 99214 OFFICE O/P EST MOD 30 MIN: CPT

## 2018-09-14 ENCOUNTER — APPOINTMENT (OUTPATIENT)
Dept: ENDOCRINOLOGY | Facility: CLINIC | Age: 33
End: 2018-09-14

## 2018-09-14 ENCOUNTER — OUTPATIENT (OUTPATIENT)
Dept: OUTPATIENT SERVICES | Facility: HOSPITAL | Age: 33
LOS: 1 days | Discharge: HOME | End: 2018-09-14

## 2018-09-14 VITALS
HEART RATE: 95 BPM | HEIGHT: 64 IN | BODY MASS INDEX: 28.17 KG/M2 | SYSTOLIC BLOOD PRESSURE: 120 MMHG | DIASTOLIC BLOOD PRESSURE: 65 MMHG | WEIGHT: 165 LBS

## 2018-09-14 DIAGNOSIS — E10.9 TYPE 1 DIABETES MELLITUS WITHOUT COMPLICATIONS: ICD-10-CM

## 2018-09-14 DIAGNOSIS — E11.42 TYPE 2 DIABETES MELLITUS WITH DIABETIC POLYNEUROPATHY: ICD-10-CM

## 2018-09-14 RX ORDER — FLUTICASONE PROPIONATE 50 UG/1
50 SPRAY, METERED NASAL DAILY
Qty: 31.6 | Refills: 3 | Status: DISCONTINUED | COMMUNITY
Start: 2017-09-21 | End: 2018-09-14

## 2018-09-14 RX ORDER — SYRINGE-NEEDLE,INSULIN,0.5 ML 31 GX5/16"
31G X 5/16" SYRINGE, EMPTY DISPOSABLE MISCELLANEOUS
Qty: 1 | Refills: 3 | Status: DISCONTINUED | COMMUNITY
Start: 2017-06-06 | End: 2018-09-14

## 2018-09-14 RX ORDER — RANITIDINE 300 MG/1
300 TABLET ORAL
Qty: 30 | Refills: 3 | Status: DISCONTINUED | COMMUNITY
Start: 2017-11-13 | End: 2018-09-14

## 2018-09-14 RX ORDER — AMMONIUM LACTATE 12 %
12 CREAM (GRAM) TOPICAL TWICE DAILY
Qty: 1 | Refills: 10 | Status: DISCONTINUED | COMMUNITY
Start: 2017-10-16 | End: 2018-09-14

## 2018-09-19 ENCOUNTER — OUTPATIENT (OUTPATIENT)
Dept: OUTPATIENT SERVICES | Facility: HOSPITAL | Age: 33
LOS: 1 days | Discharge: HOME | End: 2018-09-19

## 2018-09-19 ENCOUNTER — OTHER (OUTPATIENT)
Age: 33
End: 2018-09-19

## 2018-10-01 ENCOUNTER — APPOINTMENT (OUTPATIENT)
Dept: OTOLARYNGOLOGY | Facility: CLINIC | Age: 33
End: 2018-10-01
Payer: MEDICAID

## 2018-10-01 VITALS — HEIGHT: 64 IN | WEIGHT: 165 LBS | BODY MASS INDEX: 28.17 KG/M2

## 2018-10-01 PROCEDURE — 31575 DIAGNOSTIC LARYNGOSCOPY: CPT

## 2018-10-01 PROCEDURE — 99214 OFFICE O/P EST MOD 30 MIN: CPT | Mod: 25

## 2018-10-01 PROCEDURE — 69210 REMOVE IMPACTED EAR WAX UNI: CPT

## 2018-10-03 ENCOUNTER — APPOINTMENT (OUTPATIENT)
Dept: INTERNAL MEDICINE | Facility: CLINIC | Age: 33
End: 2018-10-03

## 2018-10-03 ENCOUNTER — OUTPATIENT (OUTPATIENT)
Dept: OUTPATIENT SERVICES | Facility: HOSPITAL | Age: 33
LOS: 1 days | Discharge: HOME | End: 2018-10-03

## 2018-10-03 VITALS
BODY MASS INDEX: 29.53 KG/M2 | HEART RATE: 99 BPM | SYSTOLIC BLOOD PRESSURE: 122 MMHG | TEMPERATURE: 98.6 F | DIASTOLIC BLOOD PRESSURE: 83 MMHG | WEIGHT: 173 LBS | HEIGHT: 64 IN

## 2018-10-03 DIAGNOSIS — R00.2 PALPITATIONS: ICD-10-CM

## 2018-10-03 DIAGNOSIS — R07.89 OTHER CHEST PAIN: ICD-10-CM

## 2018-10-03 DIAGNOSIS — E10.9 TYPE 1 DIABETES MELLITUS WITHOUT COMPLICATIONS: ICD-10-CM

## 2018-10-03 DIAGNOSIS — Z87.440 PERSONAL HISTORY OF URINARY (TRACT) INFECTIONS: ICD-10-CM

## 2018-10-03 LAB — CYTOLOGY CVX/VAG DOC THIN PREP: NORMAL

## 2018-10-03 RX ORDER — CYCLOBENZAPRINE HYDROCHLORIDE 5 MG/1
5 TABLET, FILM COATED ORAL
Qty: 30 | Refills: 0 | Status: DISCONTINUED | COMMUNITY
Start: 2018-03-13 | End: 2018-10-03

## 2018-10-03 RX ORDER — METOPROLOL TARTRATE 25 MG/1
25 TABLET, FILM COATED ORAL TWICE DAILY
Qty: 30 | Refills: 5 | Status: DISCONTINUED | COMMUNITY
Start: 2018-03-13 | End: 2018-10-03

## 2018-10-03 NOTE — REVIEW OF SYSTEMS
[Chest Pain] : chest pain [Palpitations] : palpitations [Nausea] : nausea [Heartburn] : heartburn [Joint Pain] : joint pain [Fever] : no fever [Chills] : no chills [Night Sweats] : no night sweats [Discharge] : no discharge [Vision Problems] : no vision problems [Earache] : no earache [Nasal Discharge] : no nasal discharge [Leg Claudication] : no leg claudication [Lower Ext Edema] : no lower extremity edema [Shortness Of Breath] : no shortness of breath [Wheezing] : no wheezing [Cough] : no cough [Dyspnea on Exertion] : no dyspnea on exertion [Abdominal Pain] : no abdominal pain [Vomiting] : no vomiting [Itching] : no itching [Skin Rash] : no skin rash [Headache] : no headache [Dizziness] : no dizziness [Memory Loss] : no memory loss [Unsteady Walking] : no ataxia

## 2018-10-03 NOTE — PHYSICAL EXAM
[No Acute Distress] : no acute distress [Well Nourished] : well nourished [Normal Sclera/Conjunctiva] : normal sclera/conjunctiva [Normal Outer Ear/Nose] : the outer ears and nose were normal in appearance [Normal Oropharynx] : the oropharynx was normal [Supple] : supple [No Respiratory Distress] : no respiratory distress  [Clear to Auscultation] : lungs were clear to auscultation bilaterally [No Accessory Muscle Use] : no accessory muscle use [Normal Rate] : normal rate  [Regular Rhythm] : with a regular rhythm [No Edema] : there was no peripheral edema [Soft] : abdomen soft [Non Tender] : non-tender [No HSM] : no HSM [Normal Bowel Sounds] : normal bowel sounds [No Rash] : no rash [Normal Gait] : normal gait [Coordination Grossly Intact] : coordination grossly intact [No Focal Deficits] : no focal deficits [No Murmur] : no murmur heard [No CVA Tenderness] : no CVA  tenderness [No Joint Swelling] : no joint swelling [de-identified] : +overweight [de-identified] : +insulin pump

## 2018-10-03 NOTE — HISTORY OF PRESENT ILLNESS
[FreeTextEntry1] : follow up [de-identified] : 32 y/o F with PMHx chronic pelvic pain 2/2 vulvodynia/spasms, type I DM a/w neuropathy, acid reflux, recurrent UTI, tachycardia on metoprolol, R hand tremor, chronic headaches, degenerative disk dx, presenting for routine follow up. Pt just finished a couse of abx for a UTI, last day yesterday. Abx were given by Dr Alfaro (ID), and she has a follow up appt in 1 week. She also c/o midsternal chest pain, which has been going on for months. No association with activity, not associated with any sob, however she does have associated palpitations when it occurs. No connection to changes in position. She follows with Dr Christian for cardio. Pt endorses that her FS have been very high recently due to overeating from stress (her family has to move houses). She has an insulin pump and has gone to a DM educator a couple weeks ago.

## 2018-10-03 NOTE — PLAN
[FreeTextEntry1] : 1. Type 1 DM: c/w insulin pump, A1c in 8/2018 was 8.5, has been trending up due to stress and poor diet\par -pt has already seen a DM Educator\par -will need to f/up with Dr Velasquez\par -Optho appt at end of this month, will refer to podiatry\par -will do diabetic eye exam here\par 2. Tachycardiac: likely 2/2 anxiety, follows with Dr Christian, on metoprolol 25mg q8 (which was inc from q12), she states she notices some slight improvement\par 3. Atypical CP: EKG in office-sinus tachcardia\par follows Cardio-Dr Christian, has echo scheduled for this month, then CTA when her HR comes down, has f/up appt with Cardio in Nov\par 4. Recurrent UTI: finished abx x5days yest, pt will f/up with outpt ID-Dr Alfaro in 1 wk\par 5. Headaches: c/w amityptyline\par 6. GERD: c/w famotidine, has GI referral already but needs to make appt\par 7. MSK Pain: c/w cyclobenzaprine\par 8. HCM: pap 8/17/2018 wnl, refused flu shot bc it caused DKA before

## 2018-10-19 ENCOUNTER — OUTPATIENT (OUTPATIENT)
Dept: OUTPATIENT SERVICES | Facility: HOSPITAL | Age: 33
LOS: 1 days | Discharge: HOME | End: 2018-10-19

## 2018-10-19 ENCOUNTER — APPOINTMENT (OUTPATIENT)
Dept: PODIATRY | Facility: CLINIC | Age: 33
End: 2018-10-19
Payer: MEDICAID

## 2018-10-19 VITALS
HEIGHT: 64 IN | WEIGHT: 173 LBS | BODY MASS INDEX: 29.53 KG/M2 | SYSTOLIC BLOOD PRESSURE: 125 MMHG | DIASTOLIC BLOOD PRESSURE: 81 MMHG | HEART RATE: 102 BPM

## 2018-10-19 PROCEDURE — 11721 DEBRIDE NAIL 6 OR MORE: CPT

## 2018-10-30 DIAGNOSIS — M79.671 PAIN IN RIGHT FOOT: ICD-10-CM

## 2018-10-30 DIAGNOSIS — M79.672 PAIN IN LEFT FOOT: ICD-10-CM

## 2018-10-30 DIAGNOSIS — B35.1 TINEA UNGUIUM: ICD-10-CM

## 2018-12-07 ENCOUNTER — OUTPATIENT (OUTPATIENT)
Dept: OUTPATIENT SERVICES | Facility: HOSPITAL | Age: 33
LOS: 1 days | Discharge: HOME | End: 2018-12-07

## 2018-12-07 ENCOUNTER — APPOINTMENT (OUTPATIENT)
Dept: GASTROENTEROLOGY | Facility: CLINIC | Age: 33
End: 2018-12-07

## 2018-12-07 VITALS
WEIGHT: 172.5 LBS | HEART RATE: 102 BPM | HEIGHT: 64 IN | SYSTOLIC BLOOD PRESSURE: 142 MMHG | BODY MASS INDEX: 29.45 KG/M2 | DIASTOLIC BLOOD PRESSURE: 89 MMHG

## 2018-12-14 ENCOUNTER — RX RENEWAL (OUTPATIENT)
Age: 33
End: 2018-12-14

## 2018-12-14 ENCOUNTER — LABORATORY RESULT (OUTPATIENT)
Age: 33
End: 2018-12-14

## 2018-12-14 LAB
CHOLEST SERPL-MCNC: 148 MG/DL
CHOLEST/HDLC SERPL: 3.4 RATIO
HDLC SERPL-MCNC: 44 MG/DL
LDLC SERPL CALC-MCNC: 95 MG/DL
TRIGL SERPL-MCNC: 77 MG/DL

## 2018-12-21 ENCOUNTER — APPOINTMENT (OUTPATIENT)
Dept: ENDOCRINOLOGY | Facility: CLINIC | Age: 33
End: 2018-12-21

## 2018-12-21 ENCOUNTER — OUTPATIENT (OUTPATIENT)
Dept: OUTPATIENT SERVICES | Facility: HOSPITAL | Age: 33
LOS: 1 days | Discharge: HOME | End: 2018-12-21

## 2018-12-21 VITALS
SYSTOLIC BLOOD PRESSURE: 126 MMHG | DIASTOLIC BLOOD PRESSURE: 74 MMHG | BODY MASS INDEX: 29.02 KG/M2 | WEIGHT: 170 LBS | HEART RATE: 90 BPM | HEIGHT: 64 IN

## 2018-12-21 DIAGNOSIS — E10.9 TYPE 1 DIABETES MELLITUS WITHOUT COMPLICATIONS: ICD-10-CM

## 2018-12-21 LAB
BASOPHILS # BLD AUTO: 0.06 K/UL
BASOPHILS NFR BLD AUTO: 0.7 %
EOSINOPHIL # BLD AUTO: 0.26 K/UL
EOSINOPHIL NFR BLD AUTO: 3.2 %
HCT VFR BLD CALC: 42.4 %
HGB BLD-MCNC: 14 G/DL
IMM GRANULOCYTES NFR BLD AUTO: 0.5 %
LYMPHOCYTES # BLD AUTO: 3.04 K/UL
LYMPHOCYTES NFR BLD AUTO: 36.8 %
MAN DIFF?: NORMAL
MCHC RBC-ENTMCNC: 30.5 PG
MCHC RBC-ENTMCNC: 33 G/DL
MCV RBC AUTO: 92.4 FL
MONOCYTES # BLD AUTO: 0.54 K/UL
MONOCYTES NFR BLD AUTO: 6.5 %
NEUTROPHILS # BLD AUTO: 4.31 K/UL
NEUTROPHILS NFR BLD AUTO: 52.3 %
PLATELET # BLD AUTO: 360 K/UL
RBC # BLD: 4.59 M/UL
RBC # FLD: 12.3 %
WBC # FLD AUTO: 8.25 K/UL

## 2019-01-09 ENCOUNTER — OUTPATIENT (OUTPATIENT)
Dept: OUTPATIENT SERVICES | Facility: HOSPITAL | Age: 34
LOS: 1 days | Discharge: HOME | End: 2019-01-09

## 2019-01-09 ENCOUNTER — APPOINTMENT (OUTPATIENT)
Dept: INTERNAL MEDICINE | Facility: CLINIC | Age: 34
End: 2019-01-09

## 2019-01-09 VITALS
BODY MASS INDEX: 29.53 KG/M2 | WEIGHT: 173 LBS | DIASTOLIC BLOOD PRESSURE: 83 MMHG | HEIGHT: 64 IN | TEMPERATURE: 98.8 F | SYSTOLIC BLOOD PRESSURE: 120 MMHG | HEART RATE: 97 BPM

## 2019-01-09 NOTE — REVIEW OF SYSTEMS
[Negative] : Psychiatric [Incontinence] : no incontinence [Nocturia] : no nocturia [Poor Libido] : libido not poor [Hematuria] : no hematuria [Frequency] : no frequency [Vaginal Discharge] : no vaginal discharge [FreeTextEntry4] : left ear pain  [FreeTextEntry8] : vaginal pain

## 2019-01-09 NOTE — PHYSICAL EXAM
[No Acute Distress] : no acute distress [Well Nourished] : well nourished [Well Developed] : well developed [Well-Appearing] : well-appearing [Normal Sclera/Conjunctiva] : normal sclera/conjunctiva [Normal Outer Ear/Nose] : the outer ears and nose were normal in appearance [Normal Oropharynx] : the oropharynx was normal [Supple] : supple [No Lymphadenopathy] : no lymphadenopathy [Thyroid Normal, No Nodules] : the thyroid was normal and there were no nodules present [No Respiratory Distress] : no respiratory distress  [Clear to Auscultation] : lungs were clear to auscultation bilaterally [No Accessory Muscle Use] : no accessory muscle use [Normal Rate] : normal rate  [Regular Rhythm] : with a regular rhythm [Normal S1, S2] : normal S1 and S2 [No Carotid Bruits] : no carotid bruits [No Abdominal Bruit] : a ~M bruit was not heard ~T in the abdomen [No Varicosities] : no varicosities [Pedal Pulses Present] : the pedal pulses are present [No Edema] : there was no peripheral edema [No Extremity Clubbing/Cyanosis] : no extremity clubbing/cyanosis [No Palpable Aorta] : no palpable aorta [Soft] : abdomen soft [Non Tender] : non-tender [Non-distended] : non-distended [No HSM] : no HSM [Normal Bowel Sounds] : normal bowel sounds [Normal Posterior Cervical Nodes] : no posterior cervical lymphadenopathy [Normal Anterior Cervical Nodes] : no anterior cervical lymphadenopathy [No CVA Tenderness] : no CVA  tenderness [No Spinal Tenderness] : no spinal tenderness [No Joint Swelling] : no joint swelling [Grossly Normal Strength/Tone] : grossly normal strength/tone [Normal Gait] : normal gait [Coordination Grossly Intact] : coordination grossly intact [No Focal Deficits] : no focal deficits [Normal Insight/Judgement] : insight and judgment were intact

## 2019-01-09 NOTE — ASSESSMENT
[FreeTextEntry1] : 33 year old female with multiple medical problems. \par \par # DM1: Ha1c 8.6, f/u with endocrinology, \par \par # tachycardia: f/u with doctor alexander, c/w metoprolol\par \par # CPP? recurrent UTI: f/u with urology, and physical therapy, pain management referral DR GARDNER ( nerve block ?)\par \par # TMJ pain/ left ear pain \par - f/u ENT \par # HCM \par pap smear 8/2018\par refuses flu shot

## 2019-01-11 DIAGNOSIS — R00.2 PALPITATIONS: ICD-10-CM

## 2019-01-11 DIAGNOSIS — R07.89 OTHER CHEST PAIN: ICD-10-CM

## 2019-01-11 DIAGNOSIS — Z87.440 PERSONAL HISTORY OF URINARY (TRACT) INFECTIONS: ICD-10-CM

## 2019-01-11 DIAGNOSIS — E10.9 TYPE 1 DIABETES MELLITUS WITHOUT COMPLICATIONS: ICD-10-CM

## 2019-02-08 ENCOUNTER — RX RENEWAL (OUTPATIENT)
Age: 34
End: 2019-02-08

## 2019-02-08 ENCOUNTER — APPOINTMENT (OUTPATIENT)
Dept: OTOLARYNGOLOGY | Facility: CLINIC | Age: 34
End: 2019-02-08
Payer: MEDICAID

## 2019-02-08 VITALS
HEIGHT: 64 IN | DIASTOLIC BLOOD PRESSURE: 84 MMHG | WEIGHT: 175 LBS | BODY MASS INDEX: 29.88 KG/M2 | SYSTOLIC BLOOD PRESSURE: 122 MMHG

## 2019-02-08 PROCEDURE — 31575 DIAGNOSTIC LARYNGOSCOPY: CPT

## 2019-02-08 PROCEDURE — 69210 REMOVE IMPACTED EAR WAX UNI: CPT

## 2019-02-08 PROCEDURE — 99214 OFFICE O/P EST MOD 30 MIN: CPT | Mod: 25

## 2019-02-08 NOTE — HISTORY OF PRESENT ILLNESS
[de-identified] : Patient returns to the office c/o left otalgia, mainly at night. Patient has a h/o of TMJ. has been seeing Dr Moore and uses an mouth guard.\par No dizziness. no otorrhea, no change in hearing.\par She also has a history of acid reflux. Has been feeling ok except a scratchy throat in the past few days. No dysphonia. No dyspnea.

## 2019-02-08 NOTE — PHYSICAL EXAM
[de-identified] : bilateral impacted wax cleaned with curette [Midline] : trachea located in midline position [Normal] : no rashes

## 2019-02-15 ENCOUNTER — APPOINTMENT (OUTPATIENT)
Dept: PODIATRY | Facility: CLINIC | Age: 34
End: 2019-02-15
Payer: MEDICAID

## 2019-02-15 ENCOUNTER — OUTPATIENT (OUTPATIENT)
Dept: OUTPATIENT SERVICES | Facility: HOSPITAL | Age: 34
LOS: 1 days | Discharge: HOME | End: 2019-02-15

## 2019-02-15 VITALS
HEART RATE: 92 BPM | BODY MASS INDEX: 29.88 KG/M2 | SYSTOLIC BLOOD PRESSURE: 120 MMHG | WEIGHT: 175 LBS | HEIGHT: 64 IN | DIASTOLIC BLOOD PRESSURE: 79 MMHG

## 2019-02-15 PROCEDURE — 11721 DEBRIDE NAIL 6 OR MORE: CPT

## 2019-02-19 ENCOUNTER — TRANSCRIPTION ENCOUNTER (OUTPATIENT)
Age: 34
End: 2019-02-19

## 2019-02-24 NOTE — PHYSICAL EXAM
[General Appearance - Alert] : alert [General Appearance - In No Acute Distress] : in no acute distress [Abnormal Walk] : normal gait [Nail Clubbing] : no clubbing  or cyanosis of the fingernails [Involuntary Movements] : no involuntary movements were seen [Motor Tone] : muscle strength and tone were normal [Skin Color & Pigmentation] : normal skin color and pigmentation [] : no rash [Right Foot Was Examined] : The right foot was examined [Left Foot Was Examined] : The left foot was examined [Normal Appearance] : normal in appearance [Delayed in the Right Toes] : normal in the toes [Normal in Appearance] : normal in appearance [Full ROM] : with limited range of motion [Tenderness] : tender [Delayed in the Left Toes] : normal in the toes [1+] : 1+ in the dorsalis pedis [FreeTextEntry1] : calluses, plantar heel B/L [Deep Tendon Reflexes (DTR)] : deep tendon reflexes were 2+ and symmetric [Sensation] : the sensory exam was normal to light touch and pinprick [No Focal Deficits] : no focal deficits [Oriented To Time, Place, And Person] : oriented to person, place, and time [Impaired Insight] : insight and judgment were intact [Affect] : the affect was normal

## 2019-02-24 NOTE — HISTORY OF PRESENT ILLNESS
[FreeTextEntry1] : CRISTI MONTGOMERY   presents for a foot examination, patient was referred by PCP. Patient complains of  pain of both feet and denies an acute injury.  Patient states pain is worse at and about the nail folds. Patient denies NVFC and denies SOB.\par

## 2019-02-27 ENCOUNTER — TRANSCRIPTION ENCOUNTER (OUTPATIENT)
Age: 34
End: 2019-02-27

## 2019-03-01 ENCOUNTER — APPOINTMENT (OUTPATIENT)
Dept: GASTROENTEROLOGY | Facility: CLINIC | Age: 34
End: 2019-03-01

## 2019-03-01 ENCOUNTER — OUTPATIENT (OUTPATIENT)
Dept: OUTPATIENT SERVICES | Facility: HOSPITAL | Age: 34
LOS: 1 days | Discharge: HOME | End: 2019-03-01

## 2019-03-01 VITALS
DIASTOLIC BLOOD PRESSURE: 84 MMHG | SYSTOLIC BLOOD PRESSURE: 127 MMHG | WEIGHT: 174 LBS | HEART RATE: 77 BPM | BODY MASS INDEX: 29.87 KG/M2

## 2019-03-01 DIAGNOSIS — M79.671 PAIN IN RIGHT FOOT: ICD-10-CM

## 2019-03-01 DIAGNOSIS — B35.1 TINEA UNGUIUM: ICD-10-CM

## 2019-03-01 DIAGNOSIS — M79.672 PAIN IN LEFT FOOT: ICD-10-CM

## 2019-03-01 NOTE — HISTORY OF PRESENT ILLNESS
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 \par Pt c/o burning sensation in chest, gerd symptoms , currently on famotidine at dinner time , with this pt c/o exacerebations twice in a week also wakes her up from sleep. \par Pt never tried PPI \par Pt denies abd pain, dysphagia, odynophagia, vomiting, blood in stool, melena, nsaids use , weight loss, smoking . \par Never had EGD before \par  She follows with Dr Christian for cardio as pt has sinus tachycardia and takes metoprolol. Pt endorses that her FS have been very high recently due to overeating from stress (her family has to move houses). She has an insulin pump and has gone to a DM educator a couple weeks ago.\par \par \par \par  \par

## 2019-03-01 NOTE — END OF VISIT
[FreeTextEntry3] : Pt seen and examined with GI team.  For GERD we will start omeprazole 20 mg with dinner and schedule egd.

## 2019-03-01 NOTE — ASSESSMENT
[FreeTextEntry1] : 34 yo F known DMI, neuropathy, sinus tachycardia,GERD\par Is here c/o uncontrolled GERD symptoms \par \par Pt c/o burning sensation in chest, gerd symptoms , currently on famotidine at dinner time , with this pt c/o exacerebations twice in a week also wakes her up from sleep. \par Pt never tried PPI, no prior EGD  . Pt denies abd pain, dysphagia, odynophagia, vomiting, blood in stool, melena, nsaids use , weight loss, smoking . \par \par Rec:\par - Protonix 40 mg q12 \par - Will need EGD \par - Cardiac optimization prior to EGD as pt c/o tachycardia on and off \par - Avoid NSAIDS\par -Antireflux measures \par \par

## 2019-03-01 NOTE — PHYSICAL EXAM
[General Appearance - Alert] : alert [General Appearance - In No Acute Distress] : in no acute distress [Sclera] : the sclera and conjunctiva were normal [PERRL With Normal Accommodation] : pupils were equal in size, round, and reactive to light [Extraocular Movements] : extraocular movements were intact [Outer Ear] : the ears and nose were normal in appearance [Oropharynx] : the oropharynx was normal [Neck Appearance] : the appearance of the neck was normal [Neck Cervical Mass (___cm)] : no neck mass was observed [Jugular Venous Distention Increased] : there was no jugular-venous distention [Thyroid Diffuse Enlargement] : the thyroid was not enlarged [Thyroid Nodule] : there were no palpable thyroid nodules [Auscultation Breath Sounds / Voice Sounds] : lungs were clear to auscultation bilaterally [Bowel Sounds] : normal bowel sounds [Abdomen Soft] : soft [Abdomen Tenderness] : non-tender [] : no hepato-splenomegaly [Abdomen Mass (___ Cm)] : no abdominal mass palpated [No CVA Tenderness] : no ~M costovertebral angle tenderness [No Spinal Tenderness] : no spinal tenderness [Abnormal Walk] : normal gait [Nail Clubbing] : no clubbing  or cyanosis of the fingernails [Musculoskeletal - Swelling] : no joint swelling seen [Motor Tone] : muscle strength and tone were normal

## 2019-03-07 ENCOUNTER — EMERGENCY (EMERGENCY)
Facility: HOSPITAL | Age: 34
LOS: 0 days | Discharge: HOME | End: 2019-03-08
Attending: EMERGENCY MEDICINE | Admitting: EMERGENCY MEDICINE

## 2019-03-07 VITALS
DIASTOLIC BLOOD PRESSURE: 74 MMHG | HEART RATE: 115 BPM | OXYGEN SATURATION: 97 % | SYSTOLIC BLOOD PRESSURE: 125 MMHG | TEMPERATURE: 101 F | RESPIRATION RATE: 18 BRPM

## 2019-03-07 DIAGNOSIS — Z88.8 ALLERGY STATUS TO OTHER DRUGS, MEDICAMENTS AND BIOLOGICAL SUBSTANCES STATUS: ICD-10-CM

## 2019-03-07 DIAGNOSIS — Z88.1 ALLERGY STATUS TO OTHER ANTIBIOTIC AGENTS STATUS: ICD-10-CM

## 2019-03-07 DIAGNOSIS — F32.9 MAJOR DEPRESSIVE DISORDER, SINGLE EPISODE, UNSPECIFIED: ICD-10-CM

## 2019-03-07 DIAGNOSIS — E10.9 TYPE 1 DIABETES MELLITUS WITHOUT COMPLICATIONS: ICD-10-CM

## 2019-03-07 DIAGNOSIS — R11.2 NAUSEA WITH VOMITING, UNSPECIFIED: ICD-10-CM

## 2019-03-07 DIAGNOSIS — K21.9 GASTRO-ESOPHAGEAL REFLUX DISEASE WITHOUT ESOPHAGITIS: ICD-10-CM

## 2019-03-07 DIAGNOSIS — R10.31 RIGHT LOWER QUADRANT PAIN: ICD-10-CM

## 2019-03-07 DIAGNOSIS — R10.33 PERIUMBILICAL PAIN: ICD-10-CM

## 2019-03-07 DIAGNOSIS — R10.9 UNSPECIFIED ABDOMINAL PAIN: ICD-10-CM

## 2019-03-07 DIAGNOSIS — Z79.899 OTHER LONG TERM (CURRENT) DRUG THERAPY: ICD-10-CM

## 2019-03-07 DIAGNOSIS — Z88.0 ALLERGY STATUS TO PENICILLIN: ICD-10-CM

## 2019-03-07 DIAGNOSIS — R50.9 FEVER, UNSPECIFIED: ICD-10-CM

## 2019-03-07 DIAGNOSIS — R19.7 DIARRHEA, UNSPECIFIED: ICD-10-CM

## 2019-03-07 LAB
ALBUMIN SERPL ELPH-MCNC: 4.3 G/DL — SIGNIFICANT CHANGE UP (ref 3.5–5.2)
ALP SERPL-CCNC: 91 U/L — SIGNIFICANT CHANGE UP (ref 30–115)
ALT FLD-CCNC: 25 U/L — SIGNIFICANT CHANGE UP (ref 0–41)
ANION GAP SERPL CALC-SCNC: 13 MMOL/L — SIGNIFICANT CHANGE UP (ref 7–14)
APPEARANCE UR: ABNORMAL
AST SERPL-CCNC: 16 U/L — SIGNIFICANT CHANGE UP (ref 0–41)
BASOPHILS # BLD AUTO: 0.02 K/UL — SIGNIFICANT CHANGE UP (ref 0–0.2)
BASOPHILS NFR BLD AUTO: 0.2 % — SIGNIFICANT CHANGE UP (ref 0–1)
BILIRUB SERPL-MCNC: 0.4 MG/DL — SIGNIFICANT CHANGE UP (ref 0.2–1.2)
BILIRUB UR-MCNC: NEGATIVE — SIGNIFICANT CHANGE UP
BUN SERPL-MCNC: 15 MG/DL — SIGNIFICANT CHANGE UP (ref 10–20)
CALCIUM SERPL-MCNC: 9.2 MG/DL — SIGNIFICANT CHANGE UP (ref 8.5–10.1)
CHLORIDE SERPL-SCNC: 98 MMOL/L — SIGNIFICANT CHANGE UP (ref 98–110)
CO2 SERPL-SCNC: 24 MMOL/L — SIGNIFICANT CHANGE UP (ref 17–32)
COLOR SPEC: SIGNIFICANT CHANGE UP
CREAT SERPL-MCNC: 0.7 MG/DL — SIGNIFICANT CHANGE UP (ref 0.7–1.5)
DIFF PNL FLD: NEGATIVE — SIGNIFICANT CHANGE UP
EOSINOPHIL # BLD AUTO: 0 K/UL — SIGNIFICANT CHANGE UP (ref 0–0.7)
EOSINOPHIL NFR BLD AUTO: 0 % — SIGNIFICANT CHANGE UP (ref 0–8)
EPI CELLS # UR: ABNORMAL /HPF
GLUCOSE SERPL-MCNC: 260 MG/DL — HIGH (ref 70–99)
GLUCOSE UR QL: >=1000
HCG SERPL QL: NEGATIVE — SIGNIFICANT CHANGE UP
HCT VFR BLD CALC: 40.7 % — SIGNIFICANT CHANGE UP (ref 37–47)
HGB BLD-MCNC: 13.8 G/DL — SIGNIFICANT CHANGE UP (ref 12–16)
IMM GRANULOCYTES NFR BLD AUTO: 0.8 % — HIGH (ref 0.1–0.3)
KETONES UR-MCNC: ABNORMAL
LACTATE SERPL-SCNC: 1.8 MMOL/L — SIGNIFICANT CHANGE UP (ref 0.5–2.2)
LEUKOCYTE ESTERASE UR-ACNC: NEGATIVE — SIGNIFICANT CHANGE UP
LIDOCAIN IGE QN: 12 U/L — SIGNIFICANT CHANGE UP (ref 7–60)
LYMPHOCYTES # BLD AUTO: 1.31 K/UL — SIGNIFICANT CHANGE UP (ref 1.2–3.4)
LYMPHOCYTES # BLD AUTO: 12.4 % — LOW (ref 20.5–51.1)
MCHC RBC-ENTMCNC: 30.9 PG — SIGNIFICANT CHANGE UP (ref 27–31)
MCHC RBC-ENTMCNC: 33.9 G/DL — SIGNIFICANT CHANGE UP (ref 32–37)
MCV RBC AUTO: 91.3 FL — SIGNIFICANT CHANGE UP (ref 81–99)
MONOCYTES # BLD AUTO: 0.65 K/UL — HIGH (ref 0.1–0.6)
MONOCYTES NFR BLD AUTO: 6.2 % — SIGNIFICANT CHANGE UP (ref 1.7–9.3)
NEUTROPHILS # BLD AUTO: 8.47 K/UL — HIGH (ref 1.4–6.5)
NEUTROPHILS NFR BLD AUTO: 80.4 % — HIGH (ref 42.2–75.2)
NITRITE UR-MCNC: NEGATIVE — SIGNIFICANT CHANGE UP
NRBC # BLD: 0 /100 WBCS — SIGNIFICANT CHANGE UP (ref 0–0)
PH UR: 6 — SIGNIFICANT CHANGE UP (ref 5–8)
PLATELET # BLD AUTO: 362 K/UL — SIGNIFICANT CHANGE UP (ref 130–400)
POTASSIUM SERPL-MCNC: 4 MMOL/L — SIGNIFICANT CHANGE UP (ref 3.5–5)
POTASSIUM SERPL-SCNC: 4 MMOL/L — SIGNIFICANT CHANGE UP (ref 3.5–5)
PROT SERPL-MCNC: 8.2 G/DL — HIGH (ref 6–8)
PROT UR-MCNC: 30
RBC # BLD: 4.46 M/UL — SIGNIFICANT CHANGE UP (ref 4.2–5.4)
RBC # FLD: 12.3 % — SIGNIFICANT CHANGE UP (ref 11.5–14.5)
SODIUM SERPL-SCNC: 135 MMOL/L — SIGNIFICANT CHANGE UP (ref 135–146)
SP GR SPEC: >=1.03 — SIGNIFICANT CHANGE UP (ref 1.01–1.03)
UROBILINOGEN FLD QL: 0.2 — SIGNIFICANT CHANGE UP (ref 0.2–0.2)
WBC # BLD: 10.53 K/UL — SIGNIFICANT CHANGE UP (ref 4.8–10.8)
WBC # FLD AUTO: 10.53 K/UL — SIGNIFICANT CHANGE UP (ref 4.8–10.8)

## 2019-03-07 RX ORDER — ONDANSETRON 8 MG/1
4 TABLET, FILM COATED ORAL ONCE
Qty: 0 | Refills: 0 | Status: COMPLETED | OUTPATIENT
Start: 2019-03-07 | End: 2019-03-07

## 2019-03-07 RX ORDER — SODIUM CHLORIDE 9 MG/ML
1000 INJECTION INTRAMUSCULAR; INTRAVENOUS; SUBCUTANEOUS ONCE
Qty: 0 | Refills: 0 | Status: COMPLETED | OUTPATIENT
Start: 2019-03-07 | End: 2019-03-07

## 2019-03-07 RX ORDER — ACETAMINOPHEN 500 MG
975 TABLET ORAL ONCE
Qty: 0 | Refills: 0 | Status: COMPLETED | OUTPATIENT
Start: 2019-03-07 | End: 2019-03-07

## 2019-03-07 RX ORDER — SODIUM CHLORIDE 9 MG/ML
3 INJECTION INTRAMUSCULAR; INTRAVENOUS; SUBCUTANEOUS ONCE
Qty: 0 | Refills: 0 | Status: COMPLETED | OUTPATIENT
Start: 2019-03-07 | End: 2019-03-07

## 2019-03-07 RX ORDER — MORPHINE SULFATE 50 MG/1
4 CAPSULE, EXTENDED RELEASE ORAL ONCE
Qty: 0 | Refills: 0 | Status: DISCONTINUED | OUTPATIENT
Start: 2019-03-07 | End: 2019-03-07

## 2019-03-07 RX ADMIN — ONDANSETRON 4 MILLIGRAM(S): 8 TABLET, FILM COATED ORAL at 23:45

## 2019-03-07 RX ADMIN — ONDANSETRON 4 MILLIGRAM(S): 8 TABLET, FILM COATED ORAL at 20:18

## 2019-03-07 RX ADMIN — SODIUM CHLORIDE 3 MILLILITER(S): 9 INJECTION INTRAMUSCULAR; INTRAVENOUS; SUBCUTANEOUS at 20:18

## 2019-03-07 RX ADMIN — SODIUM CHLORIDE 1000 MILLILITER(S): 9 INJECTION INTRAMUSCULAR; INTRAVENOUS; SUBCUTANEOUS at 20:19

## 2019-03-07 RX ADMIN — Medication 975 MILLIGRAM(S): at 20:18

## 2019-03-07 NOTE — ED ADULT NURSE NOTE - OBJECTIVE STATEMENT
pt aox4 complaining of abdominal pain nausea + diarrhea.  denies any episodes of vomiting pt febrile on initial assessment.  iv in place, ivf infusing,  Will continue to monitor/assess.  urine sent. Will continue to monitor/assess

## 2019-03-08 ENCOUNTER — EMERGENCY (EMERGENCY)
Facility: HOSPITAL | Age: 34
LOS: 0 days | Discharge: HOME | End: 2019-03-09
Attending: EMERGENCY MEDICINE | Admitting: EMERGENCY MEDICINE

## 2019-03-08 VITALS
HEART RATE: 115 BPM | OXYGEN SATURATION: 98 % | DIASTOLIC BLOOD PRESSURE: 72 MMHG | RESPIRATION RATE: 20 BRPM | SYSTOLIC BLOOD PRESSURE: 131 MMHG | TEMPERATURE: 97 F

## 2019-03-08 VITALS — HEART RATE: 98 BPM | TEMPERATURE: 98 F

## 2019-03-08 DIAGNOSIS — R10.9 UNSPECIFIED ABDOMINAL PAIN: ICD-10-CM

## 2019-03-08 DIAGNOSIS — E11.9 TYPE 2 DIABETES MELLITUS WITHOUT COMPLICATIONS: ICD-10-CM

## 2019-03-08 DIAGNOSIS — Z88.0 ALLERGY STATUS TO PENICILLIN: ICD-10-CM

## 2019-03-08 DIAGNOSIS — E10.9 TYPE 1 DIABETES MELLITUS WITHOUT COMPLICATIONS: ICD-10-CM

## 2019-03-08 DIAGNOSIS — Z79.899 OTHER LONG TERM (CURRENT) DRUG THERAPY: ICD-10-CM

## 2019-03-08 DIAGNOSIS — F32.9 MAJOR DEPRESSIVE DISORDER, SINGLE EPISODE, UNSPECIFIED: ICD-10-CM

## 2019-03-08 DIAGNOSIS — R00.0 TACHYCARDIA, UNSPECIFIED: ICD-10-CM

## 2019-03-08 DIAGNOSIS — Z88.7 ALLERGY STATUS TO SERUM AND VACCINE: ICD-10-CM

## 2019-03-08 DIAGNOSIS — Z79.4 LONG TERM (CURRENT) USE OF INSULIN: ICD-10-CM

## 2019-03-08 DIAGNOSIS — Z88.1 ALLERGY STATUS TO OTHER ANTIBIOTIC AGENTS STATUS: ICD-10-CM

## 2019-03-08 DIAGNOSIS — R53.0 NEOPLASTIC (MALIGNANT) RELATED FATIGUE: ICD-10-CM

## 2019-03-08 DIAGNOSIS — R10.13 EPIGASTRIC PAIN: ICD-10-CM

## 2019-03-08 RX ORDER — SODIUM CHLORIDE 9 MG/ML
2000 INJECTION, SOLUTION INTRAVENOUS ONCE
Qty: 0 | Refills: 0 | Status: COMPLETED | OUTPATIENT
Start: 2019-03-08 | End: 2019-03-08

## 2019-03-08 RX ORDER — FAMOTIDINE 10 MG/ML
20 INJECTION INTRAVENOUS ONCE
Qty: 0 | Refills: 0 | Status: COMPLETED | OUTPATIENT
Start: 2019-03-08 | End: 2019-03-08

## 2019-03-08 RX ORDER — ONDANSETRON 8 MG/1
4 TABLET, FILM COATED ORAL ONCE
Qty: 0 | Refills: 0 | Status: COMPLETED | OUTPATIENT
Start: 2019-03-08 | End: 2019-03-08

## 2019-03-08 NOTE — ED ADULT TRIAGE NOTE - CHIEF COMPLAINT QUOTE
pt sts " I have been vomiting and diarrhea for the past two days and now I feel weak and nauseas and I have abdominal pain"

## 2019-03-08 NOTE — ED PROVIDER NOTE - PROGRESS NOTE DETAILS
VS improved.  afebrile. no abd pain on re-exam. tolerating po in the ED.  discussed s/s that warrant return to ED. Discussed results with pt.  All questions were answered and return precautions discussed.  Pt is asx and comfortable at this time.  Unremarkable re-exam.  No further concerns at this time from pt.  Will follow up with PMD and GI.  Pt understands and agrees with tx plan.

## 2019-03-08 NOTE — ED PROVIDER NOTE - OBJECTIVE STATEMENT
33 y.o female w/ PMHX stated below presents to the ED for evaluation of N/V/D.  states that since yesterday 5 episodes of NBNB emesis.  Also 4-5 episodes of watery diarrhea over same time span.  No recent abxs, travel, hospital admissions.  Today developed periumbilical cramping sensation and fever prompting visit to the ED.  Pain is dull/achy, intermittent, worse w/ ROM, alleviated w/ rest, non radiating, mild severity.  Denies chest pain, dyspnea, back pain, urinary sxs, vaginal /dc or bleeding.  not sexually active.

## 2019-03-08 NOTE — ED PROVIDER NOTE - NSFOLLOWUPINSTRUCTIONS_ED_ALL_ED_FT
Abdominal Pain    Many things can cause abdominal pain. Many times, abdominal pain is not caused by a disease and will improve without treatment. Your health care provider will do a physical exam to determine if there is a dangerous cause of your pain; blood tests and imaging may help determine the cause of your pain. However, in many cases, no cause may be found and you may need further testing as an outpatient. Monitor your abdominal pain for any changes.     SEEK IMMEDIATE MEDICAL CARE IF YOU HAVE ANY OF THE FOLLOWING SYMPTOMS: worsening abdominal pain, uncontrollable vomiting, profuse diarrhea, inability to have bowel movements or pass gas, black or bloody stools, fever accompanying chest pain or back pain, or fainting. These symptoms may represent a serious problem that is an emergency. Do not wait to see if the symptoms will go away. Get medical help right away. Call 911 and do not drive yourself to the hospital.    Nausea / Vomiting    Nausea is the feeling that you have to vomit. As nausea gets worse, it can lead to vomiting. Vomiting puts you at an increased risk for dehydration. Older adults and people with other diseases or a weak immune system are at higher risk for dehydration. Drink clear fluids in small but frequent amounts as tolerated. Eat bland, easy-to-digest foods in small amounts as tolerated.    SEEK IMMEDIATE MEDICAL CARE IF YOU HAVE ANY OF THE FOLLOWING SYMPTOMS: fever, inability to keep sufficient fluids down, black or bloody vomitus, black or bloody stools, lightheadedness/dizziness, chest pain, severe headache, rash, shortness of breath, cold or clammy skin, confusion, pain with urination, or any signs of dehydration.    Follow up with your primary medical doctor in 1-2 days  Follow up with Gastroenterology this week.

## 2019-03-08 NOTE — ED PROVIDER NOTE - ATTENDING CONTRIBUTION TO CARE
33 yr old female here for eval n/v/d X 1 day. however today with periumbilical pain associated with fever. no urinary sx's, back pain, vaginal discharge. CON: ao x 3, HENMT: clear oropharynx,  neck supple,  CV: rrr, equal pulses b/l, RESP: cta b/l, GI:  soft, ttp  rlq ttp no r/g  MSk; no cva ttp NEURO: no gross motor or sensory deficit Psychiatric: appropriate mood, appropriate affect

## 2019-03-08 NOTE — ED PROVIDER NOTE - GASTROINTESTINAL, MLM
+ TTP of periumbilical and RLQ abd, no rebound or guarding, soft, non-distended, no rebound or guarding, no cva tenderness

## 2019-03-08 NOTE — ED PROVIDER NOTE - CLINICAL SUMMARY MEDICAL DECISION MAKING FREE TEXT BOX
pt presented for abdominal pain and fever. workup negative, pt denies vaginal bleeding, no high risk sexual behaviors. pt tolerated po. dc home

## 2019-03-09 VITALS
OXYGEN SATURATION: 98 % | RESPIRATION RATE: 18 BRPM | DIASTOLIC BLOOD PRESSURE: 70 MMHG | SYSTOLIC BLOOD PRESSURE: 137 MMHG | HEART RATE: 100 BPM

## 2019-03-09 LAB
ALBUMIN SERPL ELPH-MCNC: 3.9 G/DL — SIGNIFICANT CHANGE UP (ref 3.5–5.2)
ALP SERPL-CCNC: 85 U/L — SIGNIFICANT CHANGE UP (ref 30–115)
ALT FLD-CCNC: 26 U/L — SIGNIFICANT CHANGE UP (ref 0–41)
ANION GAP SERPL CALC-SCNC: 12 MMOL/L — SIGNIFICANT CHANGE UP (ref 7–14)
APPEARANCE UR: ABNORMAL
AST SERPL-CCNC: 24 U/L — SIGNIFICANT CHANGE UP (ref 0–41)
BACTERIA # UR AUTO: ABNORMAL /HPF
BASOPHILS # BLD AUTO: 0.02 K/UL — SIGNIFICANT CHANGE UP (ref 0–0.2)
BASOPHILS NFR BLD AUTO: 0.3 % — SIGNIFICANT CHANGE UP (ref 0–1)
BILIRUB SERPL-MCNC: <0.2 MG/DL — SIGNIFICANT CHANGE UP (ref 0.2–1.2)
BILIRUB UR-MCNC: NEGATIVE — SIGNIFICANT CHANGE UP
BUN SERPL-MCNC: 8 MG/DL — LOW (ref 10–20)
CALCIUM SERPL-MCNC: 8.9 MG/DL — SIGNIFICANT CHANGE UP (ref 8.5–10.1)
CHLORIDE SERPL-SCNC: 103 MMOL/L — SIGNIFICANT CHANGE UP (ref 98–110)
CO2 SERPL-SCNC: 24 MMOL/L — SIGNIFICANT CHANGE UP (ref 17–32)
COLOR SPEC: YELLOW — SIGNIFICANT CHANGE UP
CREAT SERPL-MCNC: 0.6 MG/DL — LOW (ref 0.7–1.5)
DIFF PNL FLD: NEGATIVE — SIGNIFICANT CHANGE UP
EOSINOPHIL # BLD AUTO: 0.1 K/UL — SIGNIFICANT CHANGE UP (ref 0–0.7)
EOSINOPHIL NFR BLD AUTO: 1.5 % — SIGNIFICANT CHANGE UP (ref 0–8)
EPI CELLS # UR: ABNORMAL /HPF
GLUCOSE SERPL-MCNC: 139 MG/DL — HIGH (ref 70–99)
GLUCOSE UR QL: NEGATIVE MG/DL — SIGNIFICANT CHANGE UP
HCT VFR BLD CALC: 39.2 % — SIGNIFICANT CHANGE UP (ref 37–47)
HGB BLD-MCNC: 13.2 G/DL — SIGNIFICANT CHANGE UP (ref 12–16)
IMM GRANULOCYTES NFR BLD AUTO: 0.6 % — HIGH (ref 0.1–0.3)
KETONES UR-MCNC: >=80
LACTATE SERPL-SCNC: 0.9 MMOL/L — SIGNIFICANT CHANGE UP (ref 0.5–2.2)
LEUKOCYTE ESTERASE UR-ACNC: NEGATIVE — SIGNIFICANT CHANGE UP
LIDOCAIN IGE QN: 13 U/L — SIGNIFICANT CHANGE UP (ref 7–60)
LYMPHOCYTES # BLD AUTO: 1.45 K/UL — SIGNIFICANT CHANGE UP (ref 1.2–3.4)
LYMPHOCYTES # BLD AUTO: 21.5 % — SIGNIFICANT CHANGE UP (ref 20.5–51.1)
MCHC RBC-ENTMCNC: 30.6 PG — SIGNIFICANT CHANGE UP (ref 27–31)
MCHC RBC-ENTMCNC: 33.7 G/DL — SIGNIFICANT CHANGE UP (ref 32–37)
MCV RBC AUTO: 91 FL — SIGNIFICANT CHANGE UP (ref 81–99)
MONOCYTES # BLD AUTO: 0.58 K/UL — SIGNIFICANT CHANGE UP (ref 0.1–0.6)
MONOCYTES NFR BLD AUTO: 8.6 % — SIGNIFICANT CHANGE UP (ref 1.7–9.3)
NEUTROPHILS # BLD AUTO: 4.54 K/UL — SIGNIFICANT CHANGE UP (ref 1.4–6.5)
NEUTROPHILS NFR BLD AUTO: 67.5 % — SIGNIFICANT CHANGE UP (ref 42.2–75.2)
NITRITE UR-MCNC: NEGATIVE — SIGNIFICANT CHANGE UP
NRBC # BLD: 0 /100 WBCS — SIGNIFICANT CHANGE UP (ref 0–0)
PH UR: 6 — SIGNIFICANT CHANGE UP (ref 5–8)
PLATELET # BLD AUTO: 297 K/UL — SIGNIFICANT CHANGE UP (ref 130–400)
POTASSIUM SERPL-MCNC: 4.2 MMOL/L — SIGNIFICANT CHANGE UP (ref 3.5–5)
POTASSIUM SERPL-SCNC: 4.2 MMOL/L — SIGNIFICANT CHANGE UP (ref 3.5–5)
PROT SERPL-MCNC: 6.9 G/DL — SIGNIFICANT CHANGE UP (ref 6–8)
PROT UR-MCNC: 30 MG/DL
RBC # BLD: 4.31 M/UL — SIGNIFICANT CHANGE UP (ref 4.2–5.4)
RBC # FLD: 12.1 % — SIGNIFICANT CHANGE UP (ref 11.5–14.5)
SODIUM SERPL-SCNC: 139 MMOL/L — SIGNIFICANT CHANGE UP (ref 135–146)
SP GR SPEC: 1.02 — SIGNIFICANT CHANGE UP (ref 1.01–1.03)
UROBILINOGEN FLD QL: 0.2 MG/DL — SIGNIFICANT CHANGE UP (ref 0.2–0.2)
WBC # BLD: 6.73 K/UL — SIGNIFICANT CHANGE UP (ref 4.8–10.8)
WBC # FLD AUTO: 6.73 K/UL — SIGNIFICANT CHANGE UP (ref 4.8–10.8)

## 2019-03-09 RX ORDER — ONDANSETRON 8 MG/1
4 TABLET, FILM COATED ORAL ONCE
Qty: 0 | Refills: 0 | Status: COMPLETED | OUTPATIENT
Start: 2019-03-09 | End: 2019-03-09

## 2019-03-09 RX ORDER — METOCLOPRAMIDE HCL 10 MG
10 TABLET ORAL ONCE
Qty: 0 | Refills: 0 | Status: COMPLETED | OUTPATIENT
Start: 2019-03-09 | End: 2019-03-09

## 2019-03-09 RX ADMIN — ONDANSETRON 4 MILLIGRAM(S): 8 TABLET, FILM COATED ORAL at 02:57

## 2019-03-09 RX ADMIN — ONDANSETRON 4 MILLIGRAM(S): 8 TABLET, FILM COATED ORAL at 01:00

## 2019-03-09 RX ADMIN — FAMOTIDINE 20 MILLIGRAM(S): 10 INJECTION INTRAVENOUS at 01:15

## 2019-03-09 RX ADMIN — SODIUM CHLORIDE 2000 MILLILITER(S): 9 INJECTION, SOLUTION INTRAVENOUS at 01:00

## 2019-03-09 RX ADMIN — Medication 20 MILLIGRAM(S): at 02:57

## 2019-03-09 RX ADMIN — Medication 10 MILLIGRAM(S): at 04:49

## 2019-03-09 RX ADMIN — Medication 30 MILLILITER(S): at 04:49

## 2019-03-09 NOTE — ED PROVIDER NOTE - CARE PROVIDER_API CALL
Casey Christian)  Cardiovascular Disease; Interventional Cardiology  [address] [place] [address], [place] [address]  Phone: [phone]  Fax: [phone]  Follow Up Time: 1-3 Days    Steve Delatorre)  Gastroenterology; Internal Medicine  [address] [place] [address]  Phone: [phone]  Fax: [phone]  Follow Up Time: 1-3 Days

## 2019-03-09 NOTE — ED PROVIDER NOTE - CLINICAL SUMMARY MEDICAL DECISION MAKING FREE TEXT BOX
n, v, ab pain. relief with fluids, meds. ab soft, nt. labs done. imagnig done neg from yest.  margaret po. dc home.

## 2019-03-09 NOTE — ED PROVIDER NOTE - CARE PROVIDERS DIRECT ADDRESSES
,DirectAddress_Unknown,[email or web address]

## 2019-03-09 NOTE — ED PROVIDER NOTE - PHYSICAL EXAMINATION
CONSTITUTIONAL: WA / WN / NAD  HEAD: NCAT  EYES: PERRL; EOMI;   ENT: Normal pharynx; mucous membranes pink/moist, no erythema.  NECK: Supple; no meningeal signs  CARD: tachycardic nl S1/S2; no M/R/G.   RESP: Respiratory rate and effort are normal; breath sounds clear and equal bilaterally.  ABD: Soft, ND + epigastric ttp  MSK/EXT: No gross deformities; full range of motion.  SKIN: Warm and dry;   NEURO: AAOx3,  PSYCH: Memory Intact, Normal Affect

## 2019-03-09 NOTE — ED PROVIDER NOTE - PROVIDER TOKENS
PROVIDER:[TOKEN:[[id number]],FOLLOWUP:[1-3 Days]],PROVIDER:[TOKEN:[[id number]],FOLLOWUP:[1-3 Days]]

## 2019-03-09 NOTE — ED PROVIDER NOTE - OBJECTIVE STATEMENT
33 year old female with a pmh of diabetes on an insulin pump, tachycardia (on 50mg metoprolol bid), gerd presents here for abdominal pain and nausea. Patient was seen here in the ED yesterday for multiple episodes of vomiting and diarrhea in which she had a ct  scan performed which was negative. Patient states the vomiting and diarrhea resolved but continued to have nausea and now epigastric pain. Patient admits today she has been having urinary frequency but no burning. Patient states she has been feeling her palpitations today again and has taken her metoprolol. Patient has an appointment with dr. mckenzie in 1 week for followup.

## 2019-03-09 NOTE — ED PROVIDER NOTE - NS ED ROS FT
Constitutional: See HPI.  Eyes: No visual changes  ENMT: No neck pain   Cardiac: No cp + palpitations   Respiratory: No cough   GI: see hpi  : No dysuria, +frequency no burning.  MS: No myalgia, muscle weakness, joint pain or back pain.  Neuro: No headache or weakness. No LOC.  Skin: No skin rash.

## 2019-03-09 NOTE — ED PROVIDER NOTE - NSPTACCESSSVCSAPPTDETAILS_ED_ALL_ED_FT
Please follow up with your primary care doctor within 1-2 days.  Please follow up with your cardiologist   Please follow up with your a gastroenterologist

## 2019-03-09 NOTE — ED PROVIDER NOTE - ATTENDING CONTRIBUTION TO CARE
pt with dm, c/o n, v, ab cramps. seen recently for same. also had diarrhea which has resolved. still having cramps. no fever, cp, sob. +palps. hx of such. has cardio appt next week. pt in nad, anict/pink conj, necks up, ctab, rrr, ab sof,t nt, nd, nml bs. moist mm. will hydrate, check labs.

## 2019-03-10 LAB
CULTURE RESULTS: SIGNIFICANT CHANGE UP
SPECIMEN SOURCE: SIGNIFICANT CHANGE UP

## 2019-03-22 ENCOUNTER — TRANSCRIPTION ENCOUNTER (OUTPATIENT)
Age: 34
End: 2019-03-22

## 2019-03-22 ENCOUNTER — OUTPATIENT (OUTPATIENT)
Dept: OUTPATIENT SERVICES | Facility: HOSPITAL | Age: 34
LOS: 1 days | Discharge: HOME | End: 2019-03-22

## 2019-03-22 ENCOUNTER — APPOINTMENT (OUTPATIENT)
Dept: ENDOCRINOLOGY | Facility: CLINIC | Age: 34
End: 2019-03-22

## 2019-03-22 VITALS
SYSTOLIC BLOOD PRESSURE: 132 MMHG | BODY MASS INDEX: 29.53 KG/M2 | WEIGHT: 173 LBS | DIASTOLIC BLOOD PRESSURE: 78 MMHG | HEART RATE: 90 BPM | HEIGHT: 64 IN

## 2019-03-22 LAB
ALBUMIN SERPL ELPH-MCNC: 4.2 G/DL
ALP BLD-CCNC: 82 U/L
ALT SERPL-CCNC: 54 U/L
ANION GAP SERPL CALC-SCNC: 17 MMOL/L
AST SERPL-CCNC: 34 U/L
BILIRUB SERPL-MCNC: 0.2 MG/DL
BUN SERPL-MCNC: 7 MG/DL
CALCIUM SERPL-MCNC: 9.3 MG/DL
CHLORIDE SERPL-SCNC: 101 MMOL/L
CHOLEST SERPL-MCNC: 158 MG/DL
CHOLEST/HDLC SERPL: 4.3 RATIO
CO2 SERPL-SCNC: 28 MMOL/L
CREAT SERPL-MCNC: 0.6 MG/DL
CREAT SPEC-SCNC: 150 MG/DL
ESTIMATED AVERAGE GLUCOSE: 189 MG/DL
GLUCOSE SERPL-MCNC: 117 MG/DL
HBA1C MFR BLD HPLC: 8.2 %
HDLC SERPL-MCNC: 37 MG/DL
LDLC SERPL CALC-MCNC: 108 MG/DL
MICROALBUMIN 24H UR DL<=1MG/L-MCNC: <1.2 MG/DL
MICROALBUMIN/CREAT 24H UR-RTO: NORMAL MG/G
POTASSIUM SERPL-SCNC: 4.8 MMOL/L
PROT SERPL-MCNC: 7.1 G/DL
SODIUM SERPL-SCNC: 146 MMOL/L
TRIGL SERPL-MCNC: 134 MG/DL

## 2019-03-22 NOTE — ASSESSMENT
[FreeTextEntry1] : 34 yo Fwith DM I on insulin pump presenting for fu\par \par  - DM I\par Hba1c 8.2, from 8.6. \par - No microalbuminuria\par - pt educated on dietary modifications and how to adjust insulin dosages accordingly \par - Medications and supplies refilled\par - Get A1C, lipid profile and microalbumin/creatinine in 3 months\par - F/up in 3 months\par . \par

## 2019-03-22 NOTE — HISTORY OF PRESENT ILLNESS
[FreeTextEntry1] : 32 yo F with DM I on insulin pump presenting for regular fu\par She still has vagina yeast infection and was seen in urgent care yesterday and prescribed fluconazole 150 mg qd for 2 days and Terconazole cream 0.8 % 1 wesly a day for 3 days.\par She confesses that she is not compliant with insulin pump and most of the time especially at night, forgets to pump additional insulin when she has " a cheat meal". She is going through a lot of stress now as well. She also complains of chronic ear pain

## 2019-03-26 ENCOUNTER — TRANSCRIPTION ENCOUNTER (OUTPATIENT)
Age: 34
End: 2019-03-26

## 2019-03-28 NOTE — ED CDU PROVIDER INITIAL DAY NOTE - INDICATION FOR OBSERVATION
Problem: Adult Inpatient Plan of Care  Goal: Plan of Care Review  Outcome: Ongoing (interventions implemented as appropriate)     Pt  oriented x3.Voiding without difficulty. Tolerating regular diet. Denies passing flatus but has been belching.  LTV incision with aquacel dressing intact with one small area of drainage marked.  Pain management discussed and maintained; patient reports pain controlled with ordered medications. Bed in low position, side rails up x2, call light within reach, patient safety maintained. Educated patient to call for any needs or assistance. Patient verbalized understanding.  VSS; BP's 90's/40-50's.  NAD noted. Will continue to monitor.       Diagnostic Uncertainty

## 2019-03-29 ENCOUNTER — EMERGENCY (EMERGENCY)
Facility: HOSPITAL | Age: 34
LOS: 0 days | Discharge: HOME | End: 2019-03-30
Attending: EMERGENCY MEDICINE | Admitting: EMERGENCY MEDICINE

## 2019-03-29 VITALS
HEART RATE: 132 BPM | SYSTOLIC BLOOD PRESSURE: 130 MMHG | DIASTOLIC BLOOD PRESSURE: 70 MMHG | TEMPERATURE: 98 F | OXYGEN SATURATION: 97 % | RESPIRATION RATE: 20 BRPM

## 2019-03-29 DIAGNOSIS — E10.9 TYPE 1 DIABETES MELLITUS WITHOUT COMPLICATIONS: ICD-10-CM

## 2019-03-29 DIAGNOSIS — R10.2 PELVIC AND PERINEAL PAIN: ICD-10-CM

## 2019-03-29 DIAGNOSIS — Z79.4 LONG TERM (CURRENT) USE OF INSULIN: ICD-10-CM

## 2019-03-29 DIAGNOSIS — F32.9 MAJOR DEPRESSIVE DISORDER, SINGLE EPISODE, UNSPECIFIED: ICD-10-CM

## 2019-03-29 DIAGNOSIS — M54.5 LOW BACK PAIN: ICD-10-CM

## 2019-03-29 DIAGNOSIS — Z88.0 ALLERGY STATUS TO PENICILLIN: ICD-10-CM

## 2019-03-29 DIAGNOSIS — R30.0 DYSURIA: ICD-10-CM

## 2019-03-29 DIAGNOSIS — K21.9 GASTRO-ESOPHAGEAL REFLUX DISEASE WITHOUT ESOPHAGITIS: ICD-10-CM

## 2019-03-29 DIAGNOSIS — Z79.899 OTHER LONG TERM (CURRENT) DRUG THERAPY: ICD-10-CM

## 2019-03-29 DIAGNOSIS — Z79.2 LONG TERM (CURRENT) USE OF ANTIBIOTICS: ICD-10-CM

## 2019-03-29 DIAGNOSIS — K62.89 OTHER SPECIFIED DISEASES OF ANUS AND RECTUM: ICD-10-CM

## 2019-03-29 DIAGNOSIS — Z88.1 ALLERGY STATUS TO OTHER ANTIBIOTIC AGENTS STATUS: ICD-10-CM

## 2019-03-29 LAB
ALBUMIN SERPL ELPH-MCNC: 4.7 G/DL — SIGNIFICANT CHANGE UP (ref 3.5–5.2)
ALP SERPL-CCNC: 100 U/L — SIGNIFICANT CHANGE UP (ref 30–115)
ALT FLD-CCNC: 26 U/L — SIGNIFICANT CHANGE UP (ref 0–41)
ANION GAP SERPL CALC-SCNC: 13 MMOL/L — SIGNIFICANT CHANGE UP (ref 7–14)
APPEARANCE UR: CLEAR — SIGNIFICANT CHANGE UP
AST SERPL-CCNC: 15 U/L — SIGNIFICANT CHANGE UP (ref 0–41)
BASOPHILS # BLD AUTO: 0.01 K/UL — SIGNIFICANT CHANGE UP (ref 0–0.2)
BASOPHILS NFR BLD AUTO: 0.1 % — SIGNIFICANT CHANGE UP (ref 0–1)
BILIRUB DIRECT SERPL-MCNC: <0.2 MG/DL — SIGNIFICANT CHANGE UP (ref 0–0.2)
BILIRUB INDIRECT FLD-MCNC: >0.1 MG/DL — LOW (ref 0.2–1.2)
BILIRUB SERPL-MCNC: 0.3 MG/DL — SIGNIFICANT CHANGE UP (ref 0.2–1.2)
BILIRUB UR-MCNC: NEGATIVE — SIGNIFICANT CHANGE UP
BUN SERPL-MCNC: 16 MG/DL — SIGNIFICANT CHANGE UP (ref 10–20)
CALCIUM SERPL-MCNC: 9.9 MG/DL — SIGNIFICANT CHANGE UP (ref 8.5–10.1)
CHLORIDE SERPL-SCNC: 101 MMOL/L — SIGNIFICANT CHANGE UP (ref 98–110)
CO2 SERPL-SCNC: 23 MMOL/L — SIGNIFICANT CHANGE UP (ref 17–32)
COLOR SPEC: YELLOW — SIGNIFICANT CHANGE UP
CREAT SERPL-MCNC: 0.8 MG/DL — SIGNIFICANT CHANGE UP (ref 0.7–1.5)
DIFF PNL FLD: NEGATIVE — SIGNIFICANT CHANGE UP
EOSINOPHIL # BLD AUTO: 0 K/UL — SIGNIFICANT CHANGE UP (ref 0–0.7)
EOSINOPHIL NFR BLD AUTO: 0 % — SIGNIFICANT CHANGE UP (ref 0–8)
GLUCOSE SERPL-MCNC: 277 MG/DL — HIGH (ref 70–99)
GLUCOSE UR QL: >=1000
HCT VFR BLD CALC: 40.8 % — SIGNIFICANT CHANGE UP (ref 37–47)
HGB BLD-MCNC: 13.6 G/DL — SIGNIFICANT CHANGE UP (ref 12–16)
IMM GRANULOCYTES NFR BLD AUTO: 0.4 % — HIGH (ref 0.1–0.3)
KETONES UR-MCNC: ABNORMAL
LACTATE SERPL-SCNC: 1.5 MMOL/L — SIGNIFICANT CHANGE UP (ref 0.5–2.2)
LEUKOCYTE ESTERASE UR-ACNC: NEGATIVE — SIGNIFICANT CHANGE UP
LIDOCAIN IGE QN: 12 U/L — SIGNIFICANT CHANGE UP (ref 7–60)
LYMPHOCYTES # BLD AUTO: 1.1 K/UL — LOW (ref 1.2–3.4)
LYMPHOCYTES # BLD AUTO: 10.4 % — LOW (ref 20.5–51.1)
MCHC RBC-ENTMCNC: 30.1 PG — SIGNIFICANT CHANGE UP (ref 27–31)
MCHC RBC-ENTMCNC: 33.3 G/DL — SIGNIFICANT CHANGE UP (ref 32–37)
MCV RBC AUTO: 90.3 FL — SIGNIFICANT CHANGE UP (ref 81–99)
MONOCYTES # BLD AUTO: 0.13 K/UL — SIGNIFICANT CHANGE UP (ref 0.1–0.6)
MONOCYTES NFR BLD AUTO: 1.2 % — LOW (ref 1.7–9.3)
NEUTROPHILS # BLD AUTO: 9.26 K/UL — HIGH (ref 1.4–6.5)
NEUTROPHILS NFR BLD AUTO: 87.9 % — HIGH (ref 42.2–75.2)
NITRITE UR-MCNC: NEGATIVE — SIGNIFICANT CHANGE UP
NRBC # BLD: 0 /100 WBCS — SIGNIFICANT CHANGE UP (ref 0–0)
PH UR: 6 — SIGNIFICANT CHANGE UP (ref 5–8)
PLATELET # BLD AUTO: 374 K/UL — SIGNIFICANT CHANGE UP (ref 130–400)
POTASSIUM SERPL-MCNC: 4.3 MMOL/L — SIGNIFICANT CHANGE UP (ref 3.5–5)
POTASSIUM SERPL-SCNC: 4.3 MMOL/L — SIGNIFICANT CHANGE UP (ref 3.5–5)
PROT SERPL-MCNC: 8.3 G/DL — HIGH (ref 6–8)
PROT UR-MCNC: NEGATIVE — SIGNIFICANT CHANGE UP
RBC # BLD: 4.52 M/UL — SIGNIFICANT CHANGE UP (ref 4.2–5.4)
RBC # FLD: 12.1 % — SIGNIFICANT CHANGE UP (ref 11.5–14.5)
SODIUM SERPL-SCNC: 137 MMOL/L — SIGNIFICANT CHANGE UP (ref 135–146)
SP GR SPEC: >=1.03 — SIGNIFICANT CHANGE UP (ref 1.01–1.03)
UROBILINOGEN FLD QL: 0.2 — SIGNIFICANT CHANGE UP (ref 0.2–0.2)
WBC # BLD: 10.54 K/UL — SIGNIFICANT CHANGE UP (ref 4.8–10.8)
WBC # FLD AUTO: 10.54 K/UL — SIGNIFICANT CHANGE UP (ref 4.8–10.8)

## 2019-03-29 RX ORDER — ONDANSETRON 8 MG/1
4 TABLET, FILM COATED ORAL ONCE
Qty: 0 | Refills: 0 | Status: COMPLETED | OUTPATIENT
Start: 2019-03-29 | End: 2019-03-29

## 2019-03-29 RX ORDER — METOPROLOL TARTRATE 50 MG
50 TABLET ORAL ONCE
Qty: 0 | Refills: 0 | Status: COMPLETED | OUTPATIENT
Start: 2019-03-29 | End: 2019-03-29

## 2019-03-29 RX ORDER — ACETAMINOPHEN 500 MG
650 TABLET ORAL ONCE
Qty: 0 | Refills: 0 | Status: COMPLETED | OUTPATIENT
Start: 2019-03-29 | End: 2019-03-29

## 2019-03-29 RX ORDER — ACETAMINOPHEN WITH CODEINE 300MG-30MG
1 TABLET ORAL ONCE
Qty: 0 | Refills: 0 | Status: DISCONTINUED | OUTPATIENT
Start: 2019-03-29 | End: 2019-03-29

## 2019-03-29 RX ADMIN — ONDANSETRON 4 MILLIGRAM(S): 8 TABLET, FILM COATED ORAL at 22:59

## 2019-03-29 RX ADMIN — Medication 650 MILLIGRAM(S): at 20:19

## 2019-03-29 RX ADMIN — Medication 1 TABLET(S): at 22:58

## 2019-03-29 NOTE — ED PROVIDER NOTE - NS ED ROS FT
Constitutional: no fever, chills, no recent weight loss, change in appetite or malaise  Eyes: no redness/discharge/pain/vision changes  Cardiac: No chest pain, SOB or edema.  Respiratory: No cough or respiratory distress  GI: + crampy lower abdominal pain. No nausea, vomiting, diarrhea   : + vulvar burning pain. + dysuria. No frequency, urgency or hematuria  MS: + mid lower back pain.  no loss of ROM, no weakness  Neuro: No headache or weakness. No LOC.  Skin: No skin rash.  Endocrine: + hx of DM  Except as documented in the HPI, all other systems are negative.

## 2019-03-29 NOTE — ED PROVIDER NOTE - CLINICAL SUMMARY MEDICAL DECISION MAKING FREE TEXT BOX
Pt with recent pudendal nerve block, increase in pain, found to have air in the subcutaneous fat.  surgery consulted to r/o any viscous perforation, evaluated and cleared for dc.  due to pt's h/o iddm, will give rx for abx to prophylactically treat.  pt to f/u with her pain mgt doctor in 2-3 days

## 2019-03-29 NOTE — ED PROVIDER NOTE - PROGRESS NOTE DETAILS
discussed with surgery, will evaluate pt in ED Discussed with surgery, sts free air is most likely due to post procedure. Will d/c with follow up with pain management doctor.

## 2019-03-29 NOTE — ED PROVIDER NOTE - OBJECTIVE STATEMENT
33 year old F with pmhx of type 1 DM, GERD, spinal stenosis, tachycardia c/o vulvar pain, lower back pain and crampy lower abdominal pain after nerve block today. Pt has hx of chronic vulvar dynia and had pudendal nerve block done by her pain management dr. Pt sts 20 minutes after the procedure she has had worsening burning pain over vulvar, lower back pain and crampy lower abdominal pain. + dysuria (pt has hx of frequent UTIs). She denies any fever/chills, n/v/d, bladder/bowel incontinence, chest pain, sob.

## 2019-03-29 NOTE — ED ADULT NURSE NOTE - OBJECTIVE STATEMENT
Pt s/p nerve block from today, pt experiencing burning in groin and back. pt is a&ox4, steady gait, neuro exam intact.

## 2019-03-29 NOTE — ED PROVIDER NOTE - CARE PLAN
Principal Discharge DX:	Abdominal pain Principal Discharge DX:	Abdominal pain  Secondary Diagnosis:	Rectal pain

## 2019-03-29 NOTE — ED PROVIDER NOTE - PHYSICAL EXAMINATION
CONSTITUTIONAL: Well-appearing; well-nourished; in no apparent distress.   NECK: Supple; non-tender; no cervical lymphadenopathy. No JVD.   CARDIOVASCULAR: Normal S1, S2; no murmurs, rubs, or gallops.   RESPIRATORY: Normal chest excursion with respiration; breath sounds clear and equal bilaterally; no wheezes, rhonchi, or rales.  GI/: Normal bowel sounds; non-distended; non-tender; no rebound or guarding. no palpable organomegaly.  exam chaperoned by PCA. No external abnormalities, rashes/lesions. + ttp over vulvar region  MS: No evidence of trauma or deformity. No midline spinal ttp. Normal ROM in all four extremities; non-tender to palpation;   SKIN: Normal for age and race; warm; dry; good turgor; no apparent lesions or exudate.   NEURO/PSYCH: A & O x 4; grossly unremarkable. No focal deficits. No facial droop. No tongue deviation, cerebellar intact. Strength equal b/l 5/5 throughout. Sensation intact

## 2019-03-30 VITALS
HEART RATE: 85 BPM | TEMPERATURE: 97 F | OXYGEN SATURATION: 98 % | DIASTOLIC BLOOD PRESSURE: 60 MMHG | SYSTOLIC BLOOD PRESSURE: 115 MMHG | RESPIRATION RATE: 18 BRPM

## 2019-03-30 NOTE — CONSULT NOTE ADULT - SUBJECTIVE AND OBJECTIVE BOX
General Surgery Consultation Note  =====================================================  HPI: 33y Female w/ PMH of vulvodynia, depression, tremors, headaches, GERD, T1DM, and neuropathy is sent in from her pain mgmt provider for persistent pain after pudendal nerve block procedure at 10am yesterday. Pt reports that the pain is located in the vagina, which is her usual pain, but is also c/o diffuse mild abdominal pain, lower back pain, and buttock pain in addition. Pt states that her usual vulvodynia pain returned 5-30 minutes after the procedure, and that the additional pains began 2 hours later, around 12:00 noon on 3/29. Pt states that the pain is "like a knot" in her abdomen in nature, 8/10 in severity, and associated with nausea. Pt denies vom, diarrhea, CP, SOB.         PAST MEDICAL & SURGICAL HISTORY:  Tremor of right hand  Major depressive disorder with single episode, in full remission: previously treated with zyprexa, celexa and lexapro  Intractable headache, unspecified chronicity pattern, unspecified headache type  Gastroesophageal reflux disease, esophagitis presence not specified  Urinary tract infection, recurrent  Degenerative disc disease, thoracic  Type 1 diabetes  Neuropathy: right lower extremity, vaginal  No significant past surgical history    Home Meds: Home Medications:  amitriptyline 75 mg oral tablet: 1 tab(s) orally once a day (at bedtime) (30 May 2018 14:27)  ammonium lactate 12% topical lotion:  (30 May 2018 14:27)  cyclobenzaprine 5 mg oral tablet: 1 tab(s) orally once a day (at bedtime) (30 May 2018 14:27)  Flonase 50 mcg/inh nasal spray: 1 spray(s) nasal once a day (30 May 2018 14:27)  HumaLOG:  (30 May 2018 14:27)  Imodium 2 mg oral capsule: 1 tab(s) orally 2 times a day (30 May 2018 14:27)  indomethacin 25 mg oral capsule:  (30 May 2018 14:27)  Metoprolol Tartrate 25 mg oral tablet: 0.5 tab(s) orally 2 times a day (30 May 2018 14:27)  multivitamin: 1 tab(s) orally once a day (30 May 2018 14:27)  raNITIdine 300 mg oral capsule: 1 cap(s) orally once a day (in the evening) (30 May 2018 14:27)    Allergies: Allergies    amoxicillin (Unknown)  Ceclor (Rash)  ciprofloxacin (Unknown)  clindamycin (Unknown)  gabapentin (Unknown)  Influenza Virus Vaccine, H5N1, Inactivated (Unknown)  penicillins (Unknown)      Soc:   Advanced Directives: Presumed Full Code     ROS:    REVIEW OF SYSTEMS    [x] A ten-point review of systems was otherwise negative except as noted.  [ ] Due to altered mental status/intubation, subjective information were not able to be obtained from the patient. History was obtained, to the extent possible, from review of the chart and collateral sources of information.      CURRENT MEDICATIONS:   --------------------------------------------------------------------------------------  Neurologic Medications    Respiratory Medications    Cardiovascular Medications    Gastrointestinal Medications    Genitourinary Medications    Hematologic/Oncologic Medications    Antimicrobial/Immunologic Medications    Endocrine/Metabolic Medications    Topical/Other Medications    --------------------------------------------------------------------------------------    VITAL SIGNS, INS/OUTS (last 24 hours):  --------------------------------------------------------------------------------------  ICU Vital Signs Last 24 Hrs  T(C): 36.1 (29 Mar 2019 23:30), Max: 36.8 (29 Mar 2019 19:06)  T(F): 96.9 (29 Mar 2019 23:30), Max: 98.3 (29 Mar 2019 19:06)  HR: 77 (29 Mar 2019 23:30) (77 - 132)  BP: 114/57 (29 Mar 2019 23:30) (114/57 - 137/92)  BP(mean): --  ABP: --  ABP(mean): --  RR: 18 (29 Mar 2019 23:30) (18 - 20)  SpO2: 98% (29 Mar 2019 23:30) (97% - 98%)    I&O's Summary    --------------------------------------------------------------------------------------  PHYSICAL EXAM    General: NAD, AAOx3, calm and cooperative  HEENT: Trachea ML, Neck supple  Cardiac: RRR  Respiratory: CTABL, normal respiratory effort  Abdomen: Soft, non-distended, mild diffuse tenderness  Buttocks: multiple small punctate needle sites. No drainage, pain, or swelling.   Rectal exam: good tone, no palpable hematoma, no pain  Skin: Warm/dry, normal color, no jaundice      LABS  --------------------------------------------------------------------------------------  Labs:  CAPILLARY BLOOD GLUCOSE                              13.6   10.54 )-----------( 374      ( 29 Mar 2019 21:20 )             40.8       Auto Neutrophil %: 87.9 % (03-29-19 @ 21:20)  Auto Immature Granulocyte %: 0.4 % (03-29-19 @ 21:20)    03-29    137  |  101  |  16  ----------------------------<  277<H>  4.3   |  23  |  0.8      Calcium, Total Serum: 9.9 mg/dL (03-29-19 @ 21:20)      LFTs:             8.3  | 0.3  | 15       ------------------[100     ( 29 Mar 2019 21:20 )  4.7  | <0.2 | 26          Lipase:12     Amylase:x         Lactate, Blood: 1.5 mmol/L (03-29-19 @ 21:20)      Urinalysis Basic - ( 29 Mar 2019 20:45 )    Color: Yellow / Appearance: Clear / SG: >=1.030 / pH: x  Gluc: x / Ketone: Trace  / Bili: Negative / Urobili: 0.2   Blood: x / Protein: Negative / Nitrite: Negative   Leuk Esterase: Negative / RBC: x / WBC x   Sq Epi: x / Non Sq Epi: x / Bacteria: x    --------------------------------------------------------------------------------------  IMAGING RESULTS    < from: CT Abdomen and Pelvis w/ IV Cont (03.30.19 @ 01:07) >  IMPRESSION:    Inflammatory changesand several flecks of air along the left pelvic   sidewall are compatible with sequela of treatment related changes.     Several flecks of air in the mesorectal fat region. Underlying   perforation is not entirely excluded.    < end of copied text >    ---------------------------------------------------------------------------------------    ASSESSMENT:  33y Female ***    PLAN:       --------------------------------------------------------------------------------------    03-30-19 @ 04:35 General Surgery Consultation Note  =====================================================  HPI: 33y Female w/ PMH of vulvodynia, depression, tremors, headaches, GERD, T1DM, and neuropathy is sent in from her pain mgmt provider for persistent pain after pudendal nerve block procedure at 10am yesterday. Pt reports that the pain is located in the vagina, which is her usual pain, but is also c/o diffuse mild abdominal pain, lower back pain, and buttock pain in addition. Pt states that her usual vulvodynia pain returned 5-30 minutes after the procedure, and that the additional pains began 2 hours later, around 12:00 noon on 3/29. Pt states that the pain is "like a knot" in her abdomen in nature, 8/10 in severity, and associated with nausea. Pt denies vom, diarrhea, CP, SOB.         PAST MEDICAL & SURGICAL HISTORY:  Tremor of right hand  Major depressive disorder with single episode, in full remission: previously treated with zyprexa, celexa and lexapro  Intractable headache, unspecified chronicity pattern, unspecified headache type  Gastroesophageal reflux disease, esophagitis presence not specified  Urinary tract infection, recurrent  Degenerative disc disease, thoracic  Type 1 diabetes  Neuropathy: right lower extremity, vaginal  No significant past surgical history    Home Meds: Home Medications:  amitriptyline 75 mg oral tablet: 1 tab(s) orally once a day (at bedtime) (30 May 2018 14:27)  ammonium lactate 12% topical lotion:  (30 May 2018 14:27)  cyclobenzaprine 5 mg oral tablet: 1 tab(s) orally once a day (at bedtime) (30 May 2018 14:27)  Flonase 50 mcg/inh nasal spray: 1 spray(s) nasal once a day (30 May 2018 14:27)  HumaLOG:  (30 May 2018 14:27)  Imodium 2 mg oral capsule: 1 tab(s) orally 2 times a day (30 May 2018 14:27)  indomethacin 25 mg oral capsule:  (30 May 2018 14:27)  Metoprolol Tartrate 25 mg oral tablet: 0.5 tab(s) orally 2 times a day (30 May 2018 14:27)  multivitamin: 1 tab(s) orally once a day (30 May 2018 14:27)  raNITIdine 300 mg oral capsule: 1 cap(s) orally once a day (in the evening) (30 May 2018 14:27)    Allergies: Allergies    amoxicillin (Unknown)  Ceclor (Rash)  ciprofloxacin (Unknown)  clindamycin (Unknown)  gabapentin (Unknown)  Influenza Virus Vaccine, H5N1, Inactivated (Unknown)  penicillins (Unknown)      Soc:   Advanced Directives: Presumed Full Code     ROS:    REVIEW OF SYSTEMS    [x] A ten-point review of systems was otherwise negative except as noted.  [ ] Due to altered mental status/intubation, subjective information were not able to be obtained from the patient. History was obtained, to the extent possible, from review of the chart and collateral sources of information.      CURRENT MEDICATIONS:   --------------------------------------------------------------------------------------  Neurologic Medications    Respiratory Medications    Cardiovascular Medications    Gastrointestinal Medications    Genitourinary Medications    Hematologic/Oncologic Medications    Antimicrobial/Immunologic Medications    Endocrine/Metabolic Medications    Topical/Other Medications    --------------------------------------------------------------------------------------    VITAL SIGNS, INS/OUTS (last 24 hours):  --------------------------------------------------------------------------------------  ICU Vital Signs Last 24 Hrs  T(C): 36.1 (29 Mar 2019 23:30), Max: 36.8 (29 Mar 2019 19:06)  T(F): 96.9 (29 Mar 2019 23:30), Max: 98.3 (29 Mar 2019 19:06)  HR: 77 (29 Mar 2019 23:30) (77 - 132)  BP: 114/57 (29 Mar 2019 23:30) (114/57 - 137/92)  BP(mean): --  ABP: --  ABP(mean): --  RR: 18 (29 Mar 2019 23:30) (18 - 20)  SpO2: 98% (29 Mar 2019 23:30) (97% - 98%)    I&O's Summary    --------------------------------------------------------------------------------------  PHYSICAL EXAM    General: NAD, AAOx3, calm and cooperative  HEENT: Trachea ML, Neck supple  Cardiac: RRR  Respiratory: CTABL, normal respiratory effort  Abdomen: Soft, non-distended, mild diffuse tenderness  Buttocks: multiple small punctate needle sites. No drainage, pain, or swelling.   Rectal exam: good tone, no palpable hematoma, no pain, no blood  Skin: Warm/dry, normal color, no jaundice      LABS  --------------------------------------------------------------------------------------  Labs:  CAPILLARY BLOOD GLUCOSE                              13.6   10.54 )-----------( 374      ( 29 Mar 2019 21:20 )             40.8       Auto Neutrophil %: 87.9 % (03-29-19 @ 21:20)  Auto Immature Granulocyte %: 0.4 % (03-29-19 @ 21:20)    03-29    137  |  101  |  16  ----------------------------<  277<H>  4.3   |  23  |  0.8      Calcium, Total Serum: 9.9 mg/dL (03-29-19 @ 21:20)      LFTs:             8.3  | 0.3  | 15       ------------------[100     ( 29 Mar 2019 21:20 )  4.7  | <0.2 | 26          Lipase:12     Amylase:x         Lactate, Blood: 1.5 mmol/L (03-29-19 @ 21:20)      Urinalysis Basic - ( 29 Mar 2019 20:45 )    Color: Yellow / Appearance: Clear / SG: >=1.030 / pH: x  Gluc: x / Ketone: Trace  / Bili: Negative / Urobili: 0.2   Blood: x / Protein: Negative / Nitrite: Negative   Leuk Esterase: Negative / RBC: x / WBC x   Sq Epi: x / Non Sq Epi: x / Bacteria: x    --------------------------------------------------------------------------------------  IMAGING RESULTS    < from: CT Abdomen and Pelvis w/ IV Cont (03.30.19 @ 01:07) >  IMPRESSION:    Inflammatory changesand several flecks of air along the left pelvic   sidewall are compatible with sequela of treatment related changes.     Several flecks of air in the mesorectal fat region. Underlying   perforation is not entirely excluded.    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   CT abd pelvis reviewed, air likely post-procedural  No fever, no leucocytosis, lactate normal  Rectal exam nontender, with no blood    PLAN:     No acute surgical findings on exam  No acute surgical intervention at this time  Pt should f/u with her anesthesiologist as scheduled    Pt and her mother instructed on the following:  If the pt develops fevers, chills, naus/vom, diarrhea, bloody BM, increasing or new abdominal or rectal pain, she should return to the ED for re-evaluation.       --------------------------------------------------------------------------------------    03-30-19 @ 04:35 General Surgery Consultation Note  =====================================================  HPI: 33y Female w/ PMH of vulvodynia, depression, tremors, headaches, GERD, T1DM, and neuropathy is sent in from her pain mgmt provider for persistent pain after pudendal nerve block procedure at 10am yesterday. Pt reports that the pain is located in the vagina, which is her usual pain, but is also c/o diffuse mild abdominal pain, lower back pain, and buttock pain in addition. Pt states that her usual vulvodynia pain returned 5-30 minutes after the procedure, and that the additional pains began 2 hours later, around 12:00 noon on 3/29. Pt states that the pain is "like a knot" in her abdomen in nature, 8/10 in severity, and associated with nausea. Pt denies vom, diarrhea, CP, SOB.     PAST MEDICAL & SURGICAL HISTORY:  Tremor of right hand  Major depressive disorder with single episode, in full remission: previously treated with zyprexa, celexa and lexapro  Intractable headache, unspecified chronicity pattern, unspecified headache type  Gastroesophageal reflux disease, esophagitis presence not specified  Urinary tract infection, recurrent  Degenerative disc disease, thoracic  Type 1 diabetes  Neuropathy: right lower extremity, vaginal  No significant past surgical history    Home Meds: Home Medications:  amitriptyline 75 mg oral tablet: 1 tab(s) orally once a day (at bedtime) (30 May 2018 14:27)  ammonium lactate 12% topical lotion:  (30 May 2018 14:27)  cyclobenzaprine 5 mg oral tablet: 1 tab(s) orally once a day (at bedtime) (30 May 2018 14:27)  Flonase 50 mcg/inh nasal spray: 1 spray(s) nasal once a day (30 May 2018 14:27)  HumaLOG:  (30 May 2018 14:27)  Imodium 2 mg oral capsule: 1 tab(s) orally 2 times a day (30 May 2018 14:27)  indomethacin 25 mg oral capsule:  (30 May 2018 14:27)  Metoprolol Tartrate 25 mg oral tablet: 0.5 tab(s) orally 2 times a day (30 May 2018 14:27)  multivitamin: 1 tab(s) orally once a day (30 May 2018 14:27)  raNITIdine 300 mg oral capsule: 1 cap(s) orally once a day (in the evening) (30 May 2018 14:27)    Allergies: Allergies    amoxicillin (Unknown)  Ceclor (Rash)  ciprofloxacin (Unknown)  clindamycin (Unknown)  gabapentin (Unknown)  Influenza Virus Vaccine, H5N1, Inactivated (Unknown)  penicillins (Unknown)      Soc:   Advanced Directives: Presumed Full Code     ROS:    REVIEW OF SYSTEMS    [x] A ten-point review of systems was otherwise negative except as noted.  [ ] Due to altered mental status/intubation, subjective information were not able to be obtained from the patient. History was obtained, to the extent possible, from review of the chart and collateral sources of information.      CURRENT MEDICATIONS:   --------------------------------------------------------------------------------------  Neurologic Medications    Respiratory Medications    Cardiovascular Medications    Gastrointestinal Medications    Genitourinary Medications    Hematologic/Oncologic Medications    Antimicrobial/Immunologic Medications    Endocrine/Metabolic Medications    Topical/Other Medications    --------------------------------------------------------------------------------------    VITAL SIGNS, INS/OUTS (last 24 hours):  --------------------------------------------------------------------------------------  ICU Vital Signs Last 24 Hrs  T(C): 36.1 (29 Mar 2019 23:30), Max: 36.8 (29 Mar 2019 19:06)  T(F): 96.9 (29 Mar 2019 23:30), Max: 98.3 (29 Mar 2019 19:06)  HR: 77 (29 Mar 2019 23:30) (77 - 132)  BP: 114/57 (29 Mar 2019 23:30) (114/57 - 137/92)  BP(mean): --  ABP: --  ABP(mean): --  RR: 18 (29 Mar 2019 23:30) (18 - 20)  SpO2: 98% (29 Mar 2019 23:30) (97% - 98%)    I&O's Summary    --------------------------------------------------------------------------------------  PHYSICAL EXAM    General: NAD, AAOx3, calm and cooperative  HEENT: Trachea ML, Neck supple  Cardiac: RRR  Respiratory: CTABL, normal respiratory effort  Abdomen: Soft, non-distended, mild diffuse tenderness  Buttocks: multiple small punctate needle sites. No drainage, pain, or swelling.   Rectal exam: good tone, no palpable hematoma, no pain, no blood  Skin: Warm/dry, normal color, no jaundice      LABS  --------------------------------------------------------------------------------------  Labs:  CAPILLARY BLOOD GLUCOSE                              13.6   10.54 )-----------( 374      ( 29 Mar 2019 21:20 )             40.8       Auto Neutrophil %: 87.9 % (03-29-19 @ 21:20)  Auto Immature Granulocyte %: 0.4 % (03-29-19 @ 21:20)    03-29    137  |  101  |  16  ----------------------------<  277<H>  4.3   |  23  |  0.8      Calcium, Total Serum: 9.9 mg/dL (03-29-19 @ 21:20)      LFTs:             8.3  | 0.3  | 15       ------------------[100     ( 29 Mar 2019 21:20 )  4.7  | <0.2 | 26          Lipase:12     Amylase:x         Lactate, Blood: 1.5 mmol/L (03-29-19 @ 21:20)      Urinalysis Basic - ( 29 Mar 2019 20:45 )    Color: Yellow / Appearance: Clear / SG: >=1.030 / pH: x  Gluc: x / Ketone: Trace  / Bili: Negative / Urobili: 0.2   Blood: x / Protein: Negative / Nitrite: Negative   Leuk Esterase: Negative / RBC: x / WBC x   Sq Epi: x / Non Sq Epi: x / Bacteria: x    --------------------------------------------------------------------------------------  IMAGING RESULTS    < from: CT Abdomen and Pelvis w/ IV Cont (03.30.19 @ 01:07) >  IMPRESSION:    Inflammatory changesand several flecks of air along the left pelvic   sidewall are compatible with sequela of treatment related changes.     Several flecks of air in the mesorectal fat region. Underlying   perforation is not entirely excluded.    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   CT abd pelvis reviewed, air likely post-procedural  No fever, no leucocytosis, lactate normal  Rectal exam nontender, with no blood    PLAN:     No acute surgical findings on exam  No acute surgical intervention at this time  Pt should f/u with her anesthesiologist as scheduled    Pt and her mother instructed on the following:  If the pt develops fevers, chills, naus/vom, diarrhea, bloody BM, increasing or new abdominal or rectal pain, she should return to the ED for re-evaluation.       --------------------------------------------------------------------------------------    03-30-19 @ 04:35

## 2019-03-31 LAB
CULTURE RESULTS: SIGNIFICANT CHANGE UP
SPECIMEN SOURCE: SIGNIFICANT CHANGE UP

## 2019-04-03 ENCOUNTER — RX RENEWAL (OUTPATIENT)
Age: 34
End: 2019-04-03

## 2019-04-12 ENCOUNTER — RX RENEWAL (OUTPATIENT)
Age: 34
End: 2019-04-12

## 2019-05-01 ENCOUNTER — OUTPATIENT (OUTPATIENT)
Dept: OUTPATIENT SERVICES | Facility: HOSPITAL | Age: 34
LOS: 1 days | End: 2019-05-01

## 2019-05-01 ENCOUNTER — OUTPATIENT (OUTPATIENT)
Dept: OUTPATIENT SERVICES | Facility: HOSPITAL | Age: 34
LOS: 1 days | End: 2019-05-01
Payer: MEDICAID

## 2019-05-01 PROCEDURE — G9001: CPT

## 2019-05-10 ENCOUNTER — EMERGENCY (EMERGENCY)
Facility: HOSPITAL | Age: 34
LOS: 0 days | Discharge: HOME | End: 2019-05-10
Attending: EMERGENCY MEDICINE | Admitting: EMERGENCY MEDICINE
Payer: MEDICAID

## 2019-05-10 VITALS
HEIGHT: 64 IN | TEMPERATURE: 99 F | OXYGEN SATURATION: 100 % | SYSTOLIC BLOOD PRESSURE: 121 MMHG | RESPIRATION RATE: 19 BRPM | DIASTOLIC BLOOD PRESSURE: 90 MMHG | WEIGHT: 173.94 LBS | HEART RATE: 90 BPM

## 2019-05-10 VITALS
RESPIRATION RATE: 18 BRPM | DIASTOLIC BLOOD PRESSURE: 87 MMHG | HEART RATE: 75 BPM | TEMPERATURE: 97 F | SYSTOLIC BLOOD PRESSURE: 120 MMHG | OXYGEN SATURATION: 100 %

## 2019-05-10 DIAGNOSIS — M54.5 LOW BACK PAIN: ICD-10-CM

## 2019-05-10 DIAGNOSIS — M79.10 MYALGIA, UNSPECIFIED SITE: ICD-10-CM

## 2019-05-10 DIAGNOSIS — Z88.0 ALLERGY STATUS TO PENICILLIN: ICD-10-CM

## 2019-05-10 DIAGNOSIS — K21.9 GASTRO-ESOPHAGEAL REFLUX DISEASE WITHOUT ESOPHAGITIS: ICD-10-CM

## 2019-05-10 DIAGNOSIS — Z79.899 OTHER LONG TERM (CURRENT) DRUG THERAPY: ICD-10-CM

## 2019-05-10 DIAGNOSIS — Z88.8 ALLERGY STATUS TO OTHER DRUGS, MEDICAMENTS AND BIOLOGICAL SUBSTANCES: ICD-10-CM

## 2019-05-10 DIAGNOSIS — Z79.891 LONG TERM (CURRENT) USE OF OPIATE ANALGESIC: ICD-10-CM

## 2019-05-10 DIAGNOSIS — R35.0 FREQUENCY OF MICTURITION: ICD-10-CM

## 2019-05-10 DIAGNOSIS — E11.9 TYPE 2 DIABETES MELLITUS WITHOUT COMPLICATIONS: ICD-10-CM

## 2019-05-10 LAB
ANION GAP SERPL CALC-SCNC: 11 MMOL/L — SIGNIFICANT CHANGE UP (ref 7–14)
APPEARANCE UR: CLEAR — SIGNIFICANT CHANGE UP
BASOPHILS # BLD AUTO: 0.05 K/UL — SIGNIFICANT CHANGE UP (ref 0–0.2)
BASOPHILS NFR BLD AUTO: 0.6 % — SIGNIFICANT CHANGE UP (ref 0–1)
BILIRUB UR-MCNC: NEGATIVE — SIGNIFICANT CHANGE UP
BUN SERPL-MCNC: 9 MG/DL — LOW (ref 10–20)
CALCIUM SERPL-MCNC: 9.4 MG/DL — SIGNIFICANT CHANGE UP (ref 8.5–10.1)
CHLORIDE SERPL-SCNC: 102 MMOL/L — SIGNIFICANT CHANGE UP (ref 98–110)
CO2 SERPL-SCNC: 25 MMOL/L — SIGNIFICANT CHANGE UP (ref 17–32)
COLOR SPEC: YELLOW — SIGNIFICANT CHANGE UP
CREAT SERPL-MCNC: 0.7 MG/DL — SIGNIFICANT CHANGE UP (ref 0.7–1.5)
DIFF PNL FLD: NEGATIVE — SIGNIFICANT CHANGE UP
EOSINOPHIL # BLD AUTO: 0.1 K/UL — SIGNIFICANT CHANGE UP (ref 0–0.7)
EOSINOPHIL NFR BLD AUTO: 1.3 % — SIGNIFICANT CHANGE UP (ref 0–8)
GLUCOSE SERPL-MCNC: 162 MG/DL — HIGH (ref 70–99)
GLUCOSE UR QL: NEGATIVE MG/DL — SIGNIFICANT CHANGE UP
HCT VFR BLD CALC: 42.4 % — SIGNIFICANT CHANGE UP (ref 37–47)
HGB BLD-MCNC: 14.2 G/DL — SIGNIFICANT CHANGE UP (ref 12–16)
IMM GRANULOCYTES NFR BLD AUTO: 0.3 % — SIGNIFICANT CHANGE UP (ref 0.1–0.3)
KETONES UR-MCNC: ABNORMAL
LEUKOCYTE ESTERASE UR-ACNC: NEGATIVE — SIGNIFICANT CHANGE UP
LYMPHOCYTES # BLD AUTO: 2.5 K/UL — SIGNIFICANT CHANGE UP (ref 1.2–3.4)
LYMPHOCYTES # BLD AUTO: 32.2 % — SIGNIFICANT CHANGE UP (ref 20.5–51.1)
MCHC RBC-ENTMCNC: 30.7 PG — SIGNIFICANT CHANGE UP (ref 27–31)
MCHC RBC-ENTMCNC: 33.5 G/DL — SIGNIFICANT CHANGE UP (ref 32–37)
MCV RBC AUTO: 91.6 FL — SIGNIFICANT CHANGE UP (ref 81–99)
MONOCYTES # BLD AUTO: 0.5 K/UL — SIGNIFICANT CHANGE UP (ref 0.1–0.6)
MONOCYTES NFR BLD AUTO: 6.4 % — SIGNIFICANT CHANGE UP (ref 1.7–9.3)
NEUTROPHILS # BLD AUTO: 4.59 K/UL — SIGNIFICANT CHANGE UP (ref 1.4–6.5)
NEUTROPHILS NFR BLD AUTO: 59.2 % — SIGNIFICANT CHANGE UP (ref 42.2–75.2)
NITRITE UR-MCNC: NEGATIVE — SIGNIFICANT CHANGE UP
NRBC # BLD: 0 /100 WBCS — SIGNIFICANT CHANGE UP (ref 0–0)
PH UR: 5.5 — SIGNIFICANT CHANGE UP (ref 5–8)
PLATELET # BLD AUTO: 329 K/UL — SIGNIFICANT CHANGE UP (ref 130–400)
POTASSIUM SERPL-MCNC: 4.4 MMOL/L — SIGNIFICANT CHANGE UP (ref 3.5–5)
POTASSIUM SERPL-SCNC: 4.4 MMOL/L — SIGNIFICANT CHANGE UP (ref 3.5–5)
PROT UR-MCNC: NEGATIVE MG/DL — SIGNIFICANT CHANGE UP
RBC # BLD: 4.63 M/UL — SIGNIFICANT CHANGE UP (ref 4.2–5.4)
RBC # FLD: 12.2 % — SIGNIFICANT CHANGE UP (ref 11.5–14.5)
SODIUM SERPL-SCNC: 138 MMOL/L — SIGNIFICANT CHANGE UP (ref 135–146)
SP GR SPEC: 1.02 — SIGNIFICANT CHANGE UP (ref 1.01–1.03)
UROBILINOGEN FLD QL: 0.2 MG/DL — SIGNIFICANT CHANGE UP (ref 0.2–0.2)
WBC # BLD: 7.76 K/UL — SIGNIFICANT CHANGE UP (ref 4.8–10.8)
WBC # FLD AUTO: 7.76 K/UL — SIGNIFICANT CHANGE UP (ref 4.8–10.8)

## 2019-05-10 PROCEDURE — 99284 EMERGENCY DEPT VISIT MOD MDM: CPT

## 2019-05-10 PROCEDURE — 72190 X-RAY EXAM OF PELVIS: CPT | Mod: 26

## 2019-05-10 RX ORDER — ACETAMINOPHEN 500 MG
975 TABLET ORAL ONCE
Refills: 0 | Status: COMPLETED | OUTPATIENT
Start: 2019-05-10 | End: 2019-05-10

## 2019-05-10 RX ORDER — SODIUM CHLORIDE 9 MG/ML
1000 INJECTION INTRAMUSCULAR; INTRAVENOUS; SUBCUTANEOUS ONCE
Refills: 0 | Status: COMPLETED | OUTPATIENT
Start: 2019-05-10 | End: 2019-05-10

## 2019-05-10 RX ORDER — KETOROLAC TROMETHAMINE 30 MG/ML
15 SYRINGE (ML) INJECTION ONCE
Refills: 0 | Status: DISCONTINUED | OUTPATIENT
Start: 2019-05-10 | End: 2019-05-10

## 2019-05-10 RX ORDER — RANITIDINE HYDROCHLORIDE 150 MG/1
1 TABLET, FILM COATED ORAL
Qty: 0 | Refills: 0 | DISCHARGE

## 2019-05-10 RX ORDER — MICONAZOLE NITRATE 2 %
1 CREAM (GRAM) TOPICAL
Qty: 1 | Refills: 0
Start: 2019-05-10 | End: 2019-05-12

## 2019-05-10 RX ADMIN — Medication 15 MILLIGRAM(S): at 12:58

## 2019-05-10 RX ADMIN — Medication 975 MILLIGRAM(S): at 11:36

## 2019-05-10 RX ADMIN — SODIUM CHLORIDE 1000 MILLILITER(S): 9 INJECTION INTRAMUSCULAR; INTRAVENOUS; SUBCUTANEOUS at 12:57

## 2019-05-10 NOTE — ED PROVIDER NOTE - OBJECTIVE STATEMENT
33 yo F with hx of DM Type I with insulin pump, GERD, spinal stenosis, presents for evaluation of fall, onset PTA, associated with buttock pain and right knee pain. No fever, no chills, no headache, no n/v/d, no chest pain, no back pain, no abdominal pain. Pt states that she rolled out of the air mattress which was 8 in off the floor. She reports that last time she urinated was last night at 10pm and has had urgency today but has not been able to. Pt states she has a history of UTI and this feels similar to previous.

## 2019-05-10 NOTE — ED ADULT NURSE NOTE - NSIMPLEMENTINTERV_GEN_ALL_ED
Implemented All Universal Safety Interventions:  [place] to call system. Call bell, personal items and telephone within reach. Instruct patient to call for assistance. Room bathroom lighting operational. Non-slip footwear when patient is off stretcher. Physically safe environment: no spills, clutter or unnecessary equipment. Stretcher in lowest position, wheels locked, appropriate side rails in place.

## 2019-05-10 NOTE — ED PROVIDER NOTE - CARE PROVIDER_API CALL
Hugo Meyer)  [facility]  [address] [place] [address], [address]  [place] [address]  Phone: [phone]  Fax: [phone]  Follow Up Time: 4-6 Days

## 2019-05-10 NOTE — ED PROVIDER NOTE - ATTENDING CONTRIBUTION TO CARE
I personally evaluated patient. I agree with the findings and plan with all documentation on chart except as documented  in my note.    35 yo F with hx of DM Type I with insulin pump, GERD, spinal stenosis, presents for evaluation of fall, onset PTA, associated with buttock pain and right knee pain. No fever, no chills, no headache, no n/v/d, no chest pain, no back pain, no abdominal pain. Pt states that she rolled out of the air mattress which was 8 in off the floor. She reports that last time she urinated was last night at 10pm and has had urgency today but has not been able to. Pt states she has a history of UTI and this feels similar to previous.    On exam, VS reviewed. Patient non-toxic appearing. + Mild tenderness to right iliac crest and SI joint.  ED work up negative for UTI, serious trauma.  Patient felt improved with medications.  Will DC with proper follow up and strict return precautions.  Patient aware she may require an MRI for continued pain or symptoms. Patient ambulatory at baseline.    Full DC instructions discussed and patient knows when to seek immediate medical attention.  Patient has proper follow up.  All results discussed and patient aware they may require further work up.  Proper follow up ensured. Limitations of ED work up discussed.  Medications administered and prescribed/OTC home meds discussed.  All questions and concerns from patient or family addressed. Understanding of instructions verbalized.

## 2019-05-10 NOTE — ED ADULT NURSE NOTE - PMH
Degenerative disc disease, thoracic    Gastroesophageal reflux disease, esophagitis presence not specified    Intractable headache, unspecified chronicity pattern, unspecified headache type    Major depressive disorder with single episode, in full remission  previously treated with zyprexa, celexa and lexapro  Neuropathy  right lower extremity, vaginal  Tachycardia    Tremor of right hand    Type 1 diabetes    Urinary tract infection, recurrent

## 2019-05-10 NOTE — ED PROVIDER NOTE - DATE/TIME 1
Physical Therapy Daily Treatment    Visit Count: 4  Plan of Care: 11/7/2018 Through: 1/3/2019  30-day reassessment due on or before 12/7/18    Insurance Information:  [employer]/MEDIBLUE DUAL [id number]  Fax received from [facility]  Authorized for 6 total visits  11/07/2018 thru 12/22/2018  Auth # [id number]     Referred by: Zafar Salgado MD; Next provider visit (if known/scheduled): none scheduled  Medical Diagnosis (from order):  724.4 (ICD-9-CM) - M54.16 (ICD-10-CM) - Lumbar radiculitis    Diagnosis Precautions: chronic neck pain, cervical radiculopathy, lumbar surgery in past x 3, right total knee replacement    Other precautions: EtOH dependence/tobacco abuse, generalized anxiety disorder, recurrent major depressive disorder, chronic pain syndrome, COPD, GERD, history of hepatitis C, brown sequard syndrome    SUBJECTIVE   I had a little set back. I fell forward when trying to carry a 12 pack and gallon of milk. I should not have tried to do that. Feeling it through my left back of the hip. This is the area that bothers me when I am walking.    Current Pain (0-10 scale): 8, low back  Functional Change: increased discomfort since last PT session    OBJECTIVE   Hunched posture in sitting on mat table  Crouched gait with use of SPC to ambulate back to clinic    Treatment   Moist Heat (20256): 10 minutes  Location: left low back, posterior hip  Position: sitting Temperature: 160° F  Duration: 10 minutes   Results: decreased pain; no adverse reaction to treatment    Therapeutic Exercise: 29 minutes  Exercise Repetitions Sets 11-29-18 Position/Cues   Sitting hamstring stretch 2 30 seconds  ANATOLIY   Sitting calf stretch with heel on floor - using strap 2 30 seconds   ANATOLIY    Supine single knee to chest 2  30 seconds  2 x 30 seconds ANATOLIY   Double knee to chest   2 x 30 seconds    Hooklying lumbar rotation 2 30 seconds 15 x 1 ANATOLIY   Seated lumbar flexion stretch 2 30 seconds 2 x 30 seconds, adding in  sidebend/rotation    Clamshell   5 x 1 with yellow tband around distal thigh ANATOLIY   Sidelying hip abduction   5 x 1 with yellow tband around distal thigh ANATOLIY   Seated LAQ 10 1 10 x 1 with yellow tband around ANATOLIY ankles  ANATOLIY   Seated hip abduction   10 x 1 with yellow tband around distal thigh    Seated marching 10 1 10 x 1 with yellow tband around distal thigh 3 second holds   Seated lumbar extension       Seated heel raises/toe raises 10 1  Of each, ANATOLIY   Seated toe taps out to the side (hip abduction) 10 1 10 x 1 with yellow tband around ankles ANATOLIY ANATOLIY   Seated heel taps forward 10 1 10 x 1 with yellow tband around ankles ANATOLIY ANATOLIY                 Comments: Educated to complete 5-10 reps as able to progress strength. Work into more repetitions as exercises become easier.  Educated regarding calf strengthening - eg heel raises to build up calf strength.  Educated to add TB when exercises above continue to get easier.     Skilled input: HEP selection, updated HEP. Education as listed above. Educated regarding exercise progression with resistance     Home Program:   Exercise: Date issued Date DC Comments   Seated lumbar flexion stretch, seated SKTC stretch, seated hamstring stretch  Use of heating pad prior to stretching Eval     Clamshells, sidelie hip abduction, seated marching, lumbar extension in sitting, seated calf stretch with strap. 11-16-18     LAQ, seated heel and toe raises, seated toe taps sideway, seated heel taps forward 11-23-18 11-29-18: Added yellow TB to exercises (encouraged add yellow band gradually to exercises)   Seated hamstring curl with band, hooklying lumbar rotation 11-29-18  [id number]                Writer verbally educated the patient and received verbal consent from the patient on hand placement, positioning of patient, and techniques to be performed today including hand placement for cueing of exercises, education as above and how they are pertinent to the patient's plan of care.       Suggestions for next session as indicated: progress per plan of care,   progress per plan of care,  Assess response to HEP.  STM as needed to musculature surrounding spine.  Work into extension motion.   ANATOLIY LE strengthening progression as able.  LE stretching (hip flexors, quads, hamstrings, gastrocs).  TA bracing with LE movements.      ASSESSMENT   Increased pain through low back, hip on left side today following fall at home after last PT session. Patient stating feeling sore through these areas. Did not seek medical attention - do not feel that is a need for patient to follow up with primary care physician at this time. Will continue to assess symptoms over next few PT sessions.  Progressed current HEP exercises to include yellow TB when able. Encouraged gradual progression of adding in resistance bands to improve strength - working from low reps to higher reps as strength improves. Patient feeling comfortable with this, receptive to HEP suggestions. Plan to continue PT 1x/week with 2 more visits remaining. Plan to finalize HEP within next 2 sessions.    Continued skilled PT required to progress ANATOLIY LE and core strength, improve soft-tissue flexibility, reduce pain, improve activity tolerance, progress safe gait, with transition to independent HEP.    Pain after treatment (patient reported, 0-10 scale): 0,  \"feel good, tired, muscles are tight\"  Result of above outlined education: Verbalizes understanding and Needs reinforcement    THERAPY DAILY BILLING   Insurance: [employer] MEDICARE 2. [place] MEDICAID    Evaluation Procedures:  No evaluation codes were used on this date of service    Timed Procedures:  Therapeutic Exercise, 29 minutes    Untimed Procedures:  Hot/Cold Pack Therapy x 10 minutes    Total Treatment Time: 39 minutes    G-Code:  G-Code Score ABN form  reporting not required this treatment session  Modifier based on outcome measure(s)/functional testing/clinical judgement as listed above    Referring  provider signature on file   10-May-2019 12:05

## 2019-05-10 NOTE — ED PROVIDER NOTE - CLINICAL SUMMARY MEDICAL DECISION MAKING FREE TEXT BOX
On exam, VS reviewed. Patient non-toxic appearing. + Mild tenderness to right iliac crest and SI joint.  ED work up negative for UTI, serious trauma.  Patient felt improved with medications.  Will DC with proper follow up and strict return precautions.  Patient aware she may require an MRI for continued pain or symptoms. Patient ambulatory at baseline.    Full DC instructions discussed and patient knows when to seek immediate medical attention.  Patient has proper follow up.  All results discussed and patient aware they may require further work up.  Proper follow up ensured. Limitations of ED work up discussed.  Medications administered and prescribed/OTC home meds discussed.  All questions and concerns from patient or family addressed. Understanding of instructions verbalized. Hemostasis: Electrocautery

## 2019-05-10 NOTE — ED ADULT TRIAGE NOTE - CHIEF COMPLAINT QUOTE
pt states she fell off her mattress last night which was approx. 8 inches. Pt states now she cant urinate.

## 2019-05-10 NOTE — ED PROVIDER NOTE - NSFOLLOWUPINSTRUCTIONS_ED_ALL_ED_FT
Muscle Pain, Adult  Muscle pain (myalgia) may be mild or severe. In most cases, the pain lasts only a short time and it goes away without treatment. It is normal to feel some muscle pain after starting a workout program. Muscles that have not been used often will be sore at first.    Muscle pain may also be caused by many other things, including:  Overuse or muscle strain, especially if you are not in shape. This is the most common cause of muscle pain.  Injury.  Bruises.  Viruses, such as the flu.  Infectious diseases.  A chronic condition that causes muscle tenderness, fatigue, and headache (fibromyalgia).  A condition, such as lupus, in which the body’s disease-fighting system attacks other organs in the body (autoimmune or rheumatologic diseases).  Certain drugs, including ACE inhibitors and statins.  To diagnose the cause of your muscle pain, your health care provider will do a physical exam and ask questions about the pain and when it began. If you have not had muscle pain for very long, your health care provider may want to wait before doing much testing. If your muscle pain has lasted a long time, your health care provider may want to run tests right away. In some cases, this may include tests to rule out certain conditions or illnesses.    Treatment for muscle pain depends on the cause. Home care is often enough to relieve muscle pain. Your health care provider may also prescribe anti-inflammatory medicine.    Follow these instructions at home:  Activity     If overuse is causing your muscle pain:  Slow down your activities until the pain goes away.  Do regular, gentle exercises if you are not usually active.  Warm up before exercising. Stretch before and after exercising. This can help lower the risk of muscle pain.  Do not continue working out if the pain is very bad. Bad pain could mean that you have injured a muscle.  Managing pain and discomfort     Image   If directed, apply ice to the sore muscle:  Put ice in a plastic bag.  Place a towel between your skin and the bag.  Leave the ice on for 20 minutes, 2–3 times a day.  You may also alternate between applying ice and applying heat as told by your health care provider. To apply heat, use the heat source that your health care provider recommends, such as a moist heat pack or a heating pad.  Place a towel between your skin and the heat source.  Leave the heat on for 20–30 minutes.  Remove the heat if your skin turns bright red. This is especially important if you are unable to feel pain, heat, or cold. You may have a greater risk of getting burned.  Medicines     Take over-the-counter and prescription medicines only as told by your health care provider.  Do not drive or use heavy machinery while taking prescription pain medicine.  Contact a health care provider if:  Your muscle pain gets worse and medicines do not help.  You have muscle pain that lasts longer than 3 days.  You have a rash or fever along with muscle pain.  You have muscle pain after a tick bite.  You have muscle pain while working out, even though you are in good physical condition.  You have redness, soreness, or swelling along with muscle pain.  You have muscle pain after starting a new medicine or changing the dose of a medicine.  Get help right away if:  You have trouble breathing.  You have trouble swallowing.  You have muscle pain along with a stiff neck, fever, and vomiting.  You have severe muscle weakness or cannot move part of your body.  This information is not intended to replace advice given to you by your health care provider. Make sure you discuss any questions you have with your health care provider.

## 2019-05-10 NOTE — ED PROVIDER NOTE - PROGRESS NOTE DETAILS
Patient voided in the ED with no difficulty Discussed with patient labs and imaging. Discussed need to follow up with urologist. Pt would like monistat. Discussed signs and symptoms to return to the ED for. Pt understands and agrees with discharge plan

## 2019-05-11 LAB
CULTURE RESULTS: SIGNIFICANT CHANGE UP
SPECIMEN SOURCE: SIGNIFICANT CHANGE UP

## 2019-05-12 ENCOUNTER — EMERGENCY (EMERGENCY)
Facility: HOSPITAL | Age: 34
LOS: 0 days | Discharge: HOME | End: 2019-05-12
Attending: EMERGENCY MEDICINE | Admitting: EMERGENCY MEDICINE
Payer: MEDICAID

## 2019-05-12 VITALS
DIASTOLIC BLOOD PRESSURE: 82 MMHG | SYSTOLIC BLOOD PRESSURE: 120 MMHG | OXYGEN SATURATION: 99 % | HEART RATE: 98 BPM | RESPIRATION RATE: 18 BRPM | WEIGHT: 173.94 LBS | TEMPERATURE: 96 F | HEIGHT: 64 IN

## 2019-05-12 DIAGNOSIS — E10.9 TYPE 1 DIABETES MELLITUS WITHOUT COMPLICATIONS: ICD-10-CM

## 2019-05-12 DIAGNOSIS — F32.5 MAJOR DEPRESSIVE DISORDER, SINGLE EPISODE, IN FULL REMISSION: ICD-10-CM

## 2019-05-12 DIAGNOSIS — Z88.1 ALLERGY STATUS TO OTHER ANTIBIOTIC AGENTS STATUS: ICD-10-CM

## 2019-05-12 DIAGNOSIS — Z88.8 ALLERGY STATUS TO OTHER DRUGS, MEDICAMENTS AND BIOLOGICAL SUBSTANCES: ICD-10-CM

## 2019-05-12 DIAGNOSIS — Z79.899 OTHER LONG TERM (CURRENT) DRUG THERAPY: ICD-10-CM

## 2019-05-12 DIAGNOSIS — Z88.0 ALLERGY STATUS TO PENICILLIN: ICD-10-CM

## 2019-05-12 DIAGNOSIS — Z87.39 PERSONAL HISTORY OF OTHER DISEASES OF THE MUSCULOSKELETAL SYSTEM AND CONNECTIVE TISSUE: ICD-10-CM

## 2019-05-12 DIAGNOSIS — K21.9 GASTRO-ESOPHAGEAL REFLUX DISEASE WITHOUT ESOPHAGITIS: ICD-10-CM

## 2019-05-12 DIAGNOSIS — R06.02 SHORTNESS OF BREATH: ICD-10-CM

## 2019-05-12 DIAGNOSIS — N39.0 URINARY TRACT INFECTION, SITE NOT SPECIFIED: ICD-10-CM

## 2019-05-12 DIAGNOSIS — R06.00 DYSPNEA, UNSPECIFIED: ICD-10-CM

## 2019-05-12 LAB
APPEARANCE UR: CLEAR — SIGNIFICANT CHANGE UP
BILIRUB UR-MCNC: NEGATIVE — SIGNIFICANT CHANGE UP
COLOR SPEC: YELLOW — SIGNIFICANT CHANGE UP
DIFF PNL FLD: ABNORMAL
GLUCOSE UR QL: NEGATIVE MG/DL — SIGNIFICANT CHANGE UP
KETONES UR-MCNC: NEGATIVE — SIGNIFICANT CHANGE UP
LEUKOCYTE ESTERASE UR-ACNC: SIGNIFICANT CHANGE UP
NITRITE UR-MCNC: POSITIVE
PH UR: 7 — SIGNIFICANT CHANGE UP (ref 5–8)
PROT UR-MCNC: 30 MG/DL
RBC CASTS # UR COMP ASSIST: ABNORMAL /HPF
SP GR SPEC: 1.02 — SIGNIFICANT CHANGE UP (ref 1.01–1.03)
UROBILINOGEN FLD QL: 0.2 MG/DL — SIGNIFICANT CHANGE UP (ref 0.2–0.2)
WBC UR QL: >50 /HPF

## 2019-05-12 PROCEDURE — 71046 X-RAY EXAM CHEST 2 VIEWS: CPT | Mod: 26

## 2019-05-12 PROCEDURE — 99285 EMERGENCY DEPT VISIT HI MDM: CPT

## 2019-05-12 RX ORDER — ALBUTEROL 90 UG/1
2.5 AEROSOL, METERED ORAL ONCE
Refills: 0 | Status: COMPLETED | OUTPATIENT
Start: 2019-05-12 | End: 2019-05-12

## 2019-05-12 RX ORDER — NITROFURANTOIN MACROCRYSTAL 50 MG
1 CAPSULE ORAL
Qty: 10 | Refills: 0
Start: 2019-05-12 | End: 2019-05-16

## 2019-05-12 RX ADMIN — ALBUTEROL 2.5 MILLIGRAM(S): 90 AEROSOL, METERED ORAL at 10:50

## 2019-05-12 NOTE — ED PROVIDER NOTE - OBJECTIVE STATEMENT
34 y.o female w/ hx of tachycardia, MDD, HA, GERD, DM I, spinal stenosis presents to the ED of dyspnea.  States that she woke up this morning not feeling well.  States that she feels short of breath which is not made worse with walking.  No associated chest pain, hemoptysis, edema of lower extremities, calf pain, orthopnea, URI sxs. no further complaints at this time. PERC negative.
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
## 2019-05-12 NOTE — ED PROVIDER NOTE - PROGRESS NOTE DETAILS
perc negative. feels improved with nebs.  requesting d/c.  large leukocytes/nitrates on dip.  will tx. Discussed results with pt.  All questions were answered and return precautions discussed.  Pt is asx and comfortable at this time.  Unremarkable re-exam.  No further concerns at this time from pt.  Will follow up with PMD.  Pt understands and agrees with tx plan.

## 2019-05-12 NOTE — ED PROVIDER NOTE - PHYSICAL EXAMINATION
CONST: Well appearing in NAD  EYES: Sclera and conjunctiva clear.  ENT: No nasal discharge. Oropharynx normal appearing, no erythema or exudates. Uvula midline.  NECK: Non-tender,  supple, no lymphadenopathy   CARD: Normal S1 S2; Normal rate and rhythm  RESP: Equal BS B/L, No wheezes, rhonchi or rales. No distress  MS: Normal ROM in all extremities. No edema of lower extremities, no calf pain, radial pulses 2+ bilaterally  SKIN: Warm, dry, no acute rashes. Good turgor

## 2019-05-12 NOTE — ED PROVIDER NOTE - NS ED ROS FT
Constitutional: See HPI.  Eyes: No visual changes, eye pain or discharge.   ENMT: No hearing changes, pain, discharge or infections.   Cardiac: No SOB or edema. No chest pain with exertion.  Respiratory: + dyspnea. No cough or respiratory distress.   GI: No nausea, vomiting, diarrhea or abdominal pain.  : No dysuria, frequency or burning. No Discharge  MS: No myalgia, muscle weakness, joint pain or back pain.  Neuro: No headache or weakness.  Skin: No skin rash.  Except as documented in the HPI, all other systems are negative.

## 2019-05-12 NOTE — ED ADULT NURSE NOTE - CHPI ED NUR SYMPTOMS NEG
no body aches/no chills/no cough/no wheezing/no edema/no headache/no hemoptysis/no fever/no shortness of breath/no diaphoresis/no chest pain

## 2019-05-12 NOTE — ED PROVIDER NOTE - NSFOLLOWUPINSTRUCTIONS_ED_ALL_ED_FT
Urinary Tract Infection    A urinary tract infection (UTI) is an infection of any part of the urinary tract, which includes the kidneys, ureters, bladder, and urethra. Risk factors include ignoring your need to urinate, wiping back to front if female, being an uncircumcised male, and having diabetes or a weak immune system. Symptoms include frequent urination, pain or burning with urination, foul smelling urine, cloudy urine, pain in the lower abdomen, blood in the urine, and fever. If you were prescribed an antibiotic medicine, take it as told by your health care provider. Do not stop taking the antibiotic even if you start to feel better.    SEEK IMMEDIATE MEDICAL CARE IF YOU HAVE ANY OF THE FOLLOWING SYMPTOMS: severe back or abdominal pain, fever, inability to keep fluids or medicine down, dizziness/lightheadedness, or a change in mental status.    Shortness of breath    Shortness of breath (dyspnea) means you have trouble breathing and could indicate a medical problem. Causes include lung disease, heart disease, low amount of red blood cells (anemia), poor physical fitness, being overweight, smoking, etc. Your health care provider today may not be able to find a cause for your shortness of breath after your exam. In this case, it is important to have a follow-up exam with your primary care physician as instructed. If medicines were prescribed, take them as directed for the full length of time directed. Refrain from tobacco products.    SEEK IMMEDIATE MEDICAL CARE IF YOU HAVE ANY OF THE FOLLOWING SYMPTOMS: worsening shortness of breath, chest pain, back pain, abdominal pain, fever, coughing up blood, lightheadedness/dizziness.      Follow up with your primary medical doctor in 1-2 days

## 2019-05-12 NOTE — ED ADULT NURSE NOTE - NSIMPLEMENTINTERV_GEN_ALL_ED
Implemented All Universal Safety Interventions:  [place] to call system. Call bell, personal items and telephone within reach. Instruct patient to call for assistance. Room bathroom lighting operational. Non-slip footwear when patient is off stretcher. Physically safe environment: no spills, clutter or unnecessary equipment. Stretcher in lowest position, wheels locked, appropriate side rails in place.

## 2019-05-13 DIAGNOSIS — Z71.89 OTHER SPECIFIED COUNSELING: ICD-10-CM

## 2019-05-14 LAB
-  AMIKACIN: SIGNIFICANT CHANGE UP
-  AMPICILLIN/SULBACTAM: SIGNIFICANT CHANGE UP
-  AMPICILLIN: SIGNIFICANT CHANGE UP
-  AZTREONAM: SIGNIFICANT CHANGE UP
-  CEFAZOLIN: SIGNIFICANT CHANGE UP
-  CEFEPIME: SIGNIFICANT CHANGE UP
-  CEFOXITIN: SIGNIFICANT CHANGE UP
-  CEFTRIAXONE: SIGNIFICANT CHANGE UP
-  CIPROFLOXACIN: SIGNIFICANT CHANGE UP
-  ERTAPENEM: SIGNIFICANT CHANGE UP
-  GENTAMICIN: SIGNIFICANT CHANGE UP
-  IMIPENEM: SIGNIFICANT CHANGE UP
-  LEVOFLOXACIN: SIGNIFICANT CHANGE UP
-  MEROPENEM: SIGNIFICANT CHANGE UP
-  NITROFURANTOIN: SIGNIFICANT CHANGE UP
-  PIPERACILLIN/TAZOBACTAM: SIGNIFICANT CHANGE UP
-  TIGECYCLINE: SIGNIFICANT CHANGE UP
-  TOBRAMYCIN: SIGNIFICANT CHANGE UP
-  TRIMETHOPRIM/SULFAMETHOXAZOLE: SIGNIFICANT CHANGE UP
CULTURE RESULTS: SIGNIFICANT CHANGE UP
METHOD TYPE: SIGNIFICANT CHANGE UP
ORGANISM # SPEC MICROSCOPIC CNT: SIGNIFICANT CHANGE UP
ORGANISM # SPEC MICROSCOPIC CNT: SIGNIFICANT CHANGE UP
SPECIMEN SOURCE: SIGNIFICANT CHANGE UP

## 2019-05-17 PROBLEM — R00.0 TACHYCARDIA, UNSPECIFIED: Chronic | Status: ACTIVE | Noted: 2019-05-10

## 2019-05-21 ENCOUNTER — APPOINTMENT (OUTPATIENT)
Age: 34
End: 2019-05-21
Payer: MEDICAID

## 2019-05-21 PROCEDURE — 76830 TRANSVAGINAL US NON-OB: CPT

## 2019-05-21 PROCEDURE — 99214 OFFICE O/P EST MOD 30 MIN: CPT | Mod: 25

## 2019-05-21 PROCEDURE — 81000 URINALYSIS NONAUTO W/SCOPE: CPT

## 2019-05-21 PROCEDURE — 81025 URINE PREGNANCY TEST: CPT

## 2019-05-24 ENCOUNTER — RECORD ABSTRACTING (OUTPATIENT)
Age: 34
End: 2019-05-24

## 2019-05-24 DIAGNOSIS — M54.18 RADICULOPATHY, SACRAL AND SACROCOCCYGEAL REGION: ICD-10-CM

## 2019-05-31 ENCOUNTER — OUTPATIENT (OUTPATIENT)
Dept: OUTPATIENT SERVICES | Facility: HOSPITAL | Age: 34
LOS: 1 days | Discharge: HOME | End: 2019-05-31

## 2019-05-31 ENCOUNTER — APPOINTMENT (OUTPATIENT)
Dept: PODIATRY | Facility: CLINIC | Age: 34
End: 2019-05-31
Payer: MEDICAID

## 2019-05-31 VITALS
HEIGHT: 64 IN | HEART RATE: 91 BPM | SYSTOLIC BLOOD PRESSURE: 118 MMHG | WEIGHT: 174 LBS | DIASTOLIC BLOOD PRESSURE: 80 MMHG | BODY MASS INDEX: 29.71 KG/M2

## 2019-05-31 DIAGNOSIS — M79.672 PAIN IN LEFT FOOT: ICD-10-CM

## 2019-05-31 DIAGNOSIS — M79.671 PAIN IN RIGHT FOOT: ICD-10-CM

## 2019-05-31 DIAGNOSIS — L60.3 NAIL DYSTROPHY: ICD-10-CM

## 2019-05-31 DIAGNOSIS — B35.1 TINEA UNGUIUM: ICD-10-CM

## 2019-05-31 PROCEDURE — 11721 DEBRIDE NAIL 6 OR MORE: CPT

## 2019-06-03 ENCOUNTER — EMERGENCY (EMERGENCY)
Facility: HOSPITAL | Age: 34
LOS: 0 days | Discharge: HOME | End: 2019-06-03
Attending: EMERGENCY MEDICINE | Admitting: EMERGENCY MEDICINE
Payer: MEDICAID

## 2019-06-03 VITALS — OXYGEN SATURATION: 98 % | RESPIRATION RATE: 20 BRPM | HEART RATE: 106 BPM | TEMPERATURE: 100 F

## 2019-06-03 VITALS
OXYGEN SATURATION: 97 % | DIASTOLIC BLOOD PRESSURE: 68 MMHG | RESPIRATION RATE: 20 BRPM | TEMPERATURE: 101 F | HEART RATE: 107 BPM | SYSTOLIC BLOOD PRESSURE: 144 MMHG

## 2019-06-03 DIAGNOSIS — J06.9 ACUTE UPPER RESPIRATORY INFECTION, UNSPECIFIED: ICD-10-CM

## 2019-06-03 DIAGNOSIS — Z88.8 ALLERGY STATUS TO OTHER DRUGS, MEDICAMENTS AND BIOLOGICAL SUBSTANCES: ICD-10-CM

## 2019-06-03 DIAGNOSIS — Z79.899 OTHER LONG TERM (CURRENT) DRUG THERAPY: ICD-10-CM

## 2019-06-03 DIAGNOSIS — Z79.4 LONG TERM (CURRENT) USE OF INSULIN: ICD-10-CM

## 2019-06-03 DIAGNOSIS — Z88.0 ALLERGY STATUS TO PENICILLIN: ICD-10-CM

## 2019-06-03 DIAGNOSIS — K21.9 GASTRO-ESOPHAGEAL REFLUX DISEASE WITHOUT ESOPHAGITIS: ICD-10-CM

## 2019-06-03 DIAGNOSIS — H92.09 OTALGIA, UNSPECIFIED EAR: ICD-10-CM

## 2019-06-03 DIAGNOSIS — Z88.1 ALLERGY STATUS TO OTHER ANTIBIOTIC AGENTS STATUS: ICD-10-CM

## 2019-06-03 DIAGNOSIS — F32.9 MAJOR DEPRESSIVE DISORDER, SINGLE EPISODE, UNSPECIFIED: ICD-10-CM

## 2019-06-03 DIAGNOSIS — Z79.891 LONG TERM (CURRENT) USE OF OPIATE ANALGESIC: ICD-10-CM

## 2019-06-03 DIAGNOSIS — B34.9 VIRAL INFECTION, UNSPECIFIED: ICD-10-CM

## 2019-06-03 DIAGNOSIS — E10.9 TYPE 1 DIABETES MELLITUS WITHOUT COMPLICATIONS: ICD-10-CM

## 2019-06-03 LAB
ANION GAP SERPL CALC-SCNC: 9 MMOL/L — SIGNIFICANT CHANGE UP (ref 7–14)
BASE EXCESS BLDV CALC-SCNC: 1.4 MMOL/L — SIGNIFICANT CHANGE UP (ref -2–2)
BASOPHILS # BLD AUTO: 0.03 K/UL — SIGNIFICANT CHANGE UP (ref 0–0.2)
BASOPHILS NFR BLD AUTO: 0.4 % — SIGNIFICANT CHANGE UP (ref 0–1)
BUN SERPL-MCNC: 9 MG/DL — LOW (ref 10–20)
CA-I SERPL-SCNC: 1.2 MMOL/L — SIGNIFICANT CHANGE UP (ref 1.12–1.3)
CALCIUM SERPL-MCNC: 9 MG/DL — SIGNIFICANT CHANGE UP (ref 8.5–10.1)
CHLORIDE SERPL-SCNC: 98 MMOL/L — SIGNIFICANT CHANGE UP (ref 98–110)
CO2 SERPL-SCNC: 27 MMOL/L — SIGNIFICANT CHANGE UP (ref 17–32)
CREAT SERPL-MCNC: 0.6 MG/DL — LOW (ref 0.7–1.5)
EOSINOPHIL # BLD AUTO: 0.03 K/UL — SIGNIFICANT CHANGE UP (ref 0–0.7)
EOSINOPHIL NFR BLD AUTO: 0.4 % — SIGNIFICANT CHANGE UP (ref 0–8)
GAS PNL BLDV: 135 MMOL/L — LOW (ref 136–145)
GAS PNL BLDV: SIGNIFICANT CHANGE UP
GLUCOSE SERPL-MCNC: 249 MG/DL — HIGH (ref 70–99)
HCO3 BLDV-SCNC: 28 MMOL/L — SIGNIFICANT CHANGE UP (ref 22–29)
HCT VFR BLD CALC: 42 % — SIGNIFICANT CHANGE UP (ref 37–47)
HCT VFR BLDA CALC: 45.9 % — HIGH (ref 34–44)
HGB BLD CALC-MCNC: 15 G/DL — SIGNIFICANT CHANGE UP (ref 14–18)
HGB BLD-MCNC: 14 G/DL — SIGNIFICANT CHANGE UP (ref 12–16)
HOROWITZ INDEX BLDV+IHG-RTO: 21 — SIGNIFICANT CHANGE UP
IMM GRANULOCYTES NFR BLD AUTO: 0.4 % — HIGH (ref 0.1–0.3)
LACTATE BLDV-MCNC: 1.6 MMOL/L — SIGNIFICANT CHANGE UP (ref 0.5–1.6)
LYMPHOCYTES # BLD AUTO: 1.22 K/UL — SIGNIFICANT CHANGE UP (ref 1.2–3.4)
LYMPHOCYTES # BLD AUTO: 15.3 % — LOW (ref 20.5–51.1)
MCHC RBC-ENTMCNC: 30.5 PG — SIGNIFICANT CHANGE UP (ref 27–31)
MCHC RBC-ENTMCNC: 33.3 G/DL — SIGNIFICANT CHANGE UP (ref 32–37)
MCV RBC AUTO: 91.5 FL — SIGNIFICANT CHANGE UP (ref 81–99)
MONOCYTES # BLD AUTO: 0.62 K/UL — HIGH (ref 0.1–0.6)
MONOCYTES NFR BLD AUTO: 7.8 % — SIGNIFICANT CHANGE UP (ref 1.7–9.3)
NEUTROPHILS # BLD AUTO: 6.07 K/UL — SIGNIFICANT CHANGE UP (ref 1.4–6.5)
NEUTROPHILS NFR BLD AUTO: 75.7 % — HIGH (ref 42.2–75.2)
NRBC # BLD: 0 /100 WBCS — SIGNIFICANT CHANGE UP (ref 0–0)
PCO2 BLDV: 51 MMHG — SIGNIFICANT CHANGE UP (ref 41–51)
PH BLDV: 7.35 — SIGNIFICANT CHANGE UP (ref 7.26–7.43)
PLATELET # BLD AUTO: 327 K/UL — SIGNIFICANT CHANGE UP (ref 130–400)
PO2 BLDV: 46 MMHG — HIGH (ref 20–40)
POTASSIUM BLDV-SCNC: 4.9 MMOL/L — SIGNIFICANT CHANGE UP (ref 3.3–5.6)
POTASSIUM SERPL-MCNC: 5.2 MMOL/L — HIGH (ref 3.5–5)
POTASSIUM SERPL-SCNC: 5.2 MMOL/L — HIGH (ref 3.5–5)
RBC # BLD: 4.59 M/UL — SIGNIFICANT CHANGE UP (ref 4.2–5.4)
RBC # FLD: 12.3 % — SIGNIFICANT CHANGE UP (ref 11.5–14.5)
SAO2 % BLDV: 80 % — SIGNIFICANT CHANGE UP
SODIUM SERPL-SCNC: 134 MMOL/L — LOW (ref 135–146)
WBC # BLD: 8 K/UL — SIGNIFICANT CHANGE UP (ref 4.8–10.8)
WBC # FLD AUTO: 8 K/UL — SIGNIFICANT CHANGE UP (ref 4.8–10.8)

## 2019-06-03 PROCEDURE — 99284 EMERGENCY DEPT VISIT MOD MDM: CPT

## 2019-06-03 PROCEDURE — 71046 X-RAY EXAM CHEST 2 VIEWS: CPT | Mod: 26

## 2019-06-03 RX ORDER — SODIUM CHLORIDE 9 MG/ML
1000 INJECTION INTRAMUSCULAR; INTRAVENOUS; SUBCUTANEOUS ONCE
Refills: 0 | Status: DISCONTINUED | OUTPATIENT
Start: 2019-06-03 | End: 2019-06-03

## 2019-06-03 RX ORDER — ACETAMINOPHEN 500 MG
975 TABLET ORAL ONCE
Refills: 0 | Status: COMPLETED | OUTPATIENT
Start: 2019-06-03 | End: 2019-06-03

## 2019-06-03 RX ORDER — SODIUM CHLORIDE 9 MG/ML
3 INJECTION INTRAMUSCULAR; INTRAVENOUS; SUBCUTANEOUS EVERY 8 HOURS
Refills: 0 | Status: DISCONTINUED | OUTPATIENT
Start: 2019-06-03 | End: 2019-06-03

## 2019-06-03 RX ORDER — IBUPROFEN 200 MG
600 TABLET ORAL ONCE
Refills: 0 | Status: COMPLETED | OUTPATIENT
Start: 2019-06-03 | End: 2019-06-03

## 2019-06-03 RX ADMIN — Medication 975 MILLIGRAM(S): at 12:16

## 2019-06-03 RX ADMIN — Medication 600 MILLIGRAM(S): at 11:35

## 2019-06-03 NOTE — ED PROVIDER NOTE - OBJECTIVE STATEMENT
35 y/o female with hx DM, Neuropathy, GERD, Spinal stenosis presents to the ED c/o "I have a sore throat, burning, loss of voice, cough with yellow phlegm and fever Tmax 100.8 since Friday. I have left ear pain for about 1 year." no CP/ abdominal pain/ nausea/ vomit/ weakness

## 2019-06-03 NOTE — ED ADULT NURSE NOTE - NSIMPLEMENTINTERV_GEN_ALL_ED
Implemented All Universal Safety Interventions:  [place] to call system. Call bell, personal items and telephone within reach. Instruct patient to call for assistance. Room bathroom lighting operational. Non-slip footwear when patient is off stretcher. Physically safe environment: no spills, clutter or unnecessary equipment. Stretcher in lowest position, wheels locked, appropriate side rails in place.

## 2019-06-03 NOTE — ED PROVIDER NOTE - ATTENDING CONTRIBUTION TO CARE
viral syndrome, well appearing, active, well hydrated, non-toxic.  No warning signs.  Patient is stable for supportive care and discharge.

## 2019-06-05 ENCOUNTER — FORM ENCOUNTER (OUTPATIENT)
Age: 34
End: 2019-06-05

## 2019-06-06 ENCOUNTER — APPOINTMENT (OUTPATIENT)
Dept: INTERNAL MEDICINE | Facility: CLINIC | Age: 34
End: 2019-06-06

## 2019-06-06 ENCOUNTER — OUTPATIENT (OUTPATIENT)
Dept: OUTPATIENT SERVICES | Facility: HOSPITAL | Age: 34
LOS: 1 days | Discharge: HOME | End: 2019-06-06

## 2019-06-06 ENCOUNTER — OUTPATIENT (OUTPATIENT)
Dept: OUTPATIENT SERVICES | Facility: HOSPITAL | Age: 34
LOS: 1 days | Discharge: HOME | End: 2019-06-06
Payer: MEDICAID

## 2019-06-06 VITALS
HEIGHT: 64 IN | BODY MASS INDEX: 29.71 KG/M2 | WEIGHT: 174 LBS | SYSTOLIC BLOOD PRESSURE: 119 MMHG | HEART RATE: 91 BPM | DIASTOLIC BLOOD PRESSURE: 79 MMHG

## 2019-06-06 DIAGNOSIS — J18.9 PNEUMONIA, UNSPECIFIED ORGANISM: ICD-10-CM

## 2019-06-06 PROCEDURE — 71046 X-RAY EXAM CHEST 2 VIEWS: CPT | Mod: 26

## 2019-06-06 RX ORDER — FAMOTIDINE 40 MG/1
40 TABLET, FILM COATED ORAL
Qty: 30 | Refills: 3 | Status: DISCONTINUED | COMMUNITY
Start: 2019-04-12 | End: 2019-06-06

## 2019-06-06 RX ORDER — OMEPRAZOLE 20 MG/1
20 CAPSULE, DELAYED RELEASE ORAL DAILY
Qty: 30 | Refills: 3 | Status: DISCONTINUED | COMMUNITY
Start: 2019-03-01 | End: 2019-06-06

## 2019-06-06 RX ORDER — FAMOTIDINE 20 MG/1
20 TABLET, FILM COATED ORAL
Qty: 60 | Refills: 0 | Status: DISCONTINUED | COMMUNITY
Start: 2018-09-06 | End: 2019-06-06

## 2019-06-06 NOTE — PHYSICAL EXAM
[General Appearance - Alert] : alert [General Appearance - In No Acute Distress] : in no acute distress [Abnormal Walk] : normal gait [Nail Clubbing] : no clubbing  or cyanosis of the fingernails [Involuntary Movements] : no involuntary movements were seen [Motor Tone] : muscle strength and tone were normal [Skin Color & Pigmentation] : normal skin color and pigmentation [] : no rash [Right Foot Was Examined] : The right foot was examined [Left Foot Was Examined] : The left foot was examined [Normal Appearance] : normal in appearance [Delayed in the Right Toes] : normal in the toes [Normal in Appearance] : normal in appearance [Full ROM] : with limited range of motion [Tenderness] : tender [Delayed in the Left Toes] : normal in the toes [1+] : 1+ in the dorsalis pedis [FreeTextEntry1] : calluses, plantar heel B/L [Deep Tendon Reflexes (DTR)] : deep tendon reflexes were 2+ and symmetric [Sensation] : the sensory exam was normal to light touch and pinprick [No Focal Deficits] : no focal deficits [Oriented To Time, Place, And Person] : oriented to person, place, and time [Affect] : the affect was normal [Impaired Insight] : insight and judgment were intact

## 2019-06-07 ENCOUNTER — APPOINTMENT (OUTPATIENT)
Dept: OTOLARYNGOLOGY | Facility: CLINIC | Age: 34
End: 2019-06-07
Payer: MEDICAID

## 2019-06-07 VITALS — BODY MASS INDEX: 29.71 KG/M2 | WEIGHT: 174 LBS | HEIGHT: 64 IN

## 2019-06-07 DIAGNOSIS — M26.609 UNSPECIFIED TEMPOROMANDIBULAR JOINT DISORDER: ICD-10-CM

## 2019-06-07 PROCEDURE — 31575 DIAGNOSTIC LARYNGOSCOPY: CPT

## 2019-06-07 PROCEDURE — 99214 OFFICE O/P EST MOD 30 MIN: CPT | Mod: 25

## 2019-06-07 NOTE — REVIEW OF SYSTEMS
[Patient Intake Form Reviewed] : Patient intake form was reviewed [FreeTextEntry1] : all ros negative

## 2019-06-07 NOTE — HISTORY OF PRESENT ILLNESS
[FreeTextEntry1] : 6/7/19 Patient is following up for acid reflux, TMJ, and H/O OME. Patient currently being treated for pneumonia, she is being treated with azithromycin, cefpodoxime and benzonatate for cough.  Associated symptoms: cough, nasal congestion, frontal headaches, PND. \par Patient also continues to have tMJ complaints. She was given a mouth guard to wear by her dentist.

## 2019-06-07 NOTE — REASON FOR VISIT
[Subsequent Evaluation] : a subsequent evaluation for [FreeTextEntry2] : acid reflux, TMJ, and H/O OME

## 2019-06-10 DIAGNOSIS — Z00.00 ENCOUNTER FOR GENERAL ADULT MEDICAL EXAMINATION WITHOUT ABNORMAL FINDINGS: ICD-10-CM

## 2019-06-10 DIAGNOSIS — N39.0 URINARY TRACT INFECTION, SITE NOT SPECIFIED: ICD-10-CM

## 2019-06-11 ENCOUNTER — RX CHANGE (OUTPATIENT)
Age: 34
End: 2019-06-11

## 2019-06-12 NOTE — ASSESSMENT
[FreeTextEntry1] : 34 F w/ recent ED visits for URI\par \par # URI w/ underlying PNA \par - rpt CXR \par - start abx: azithromycin (500 mg once for first day, then 250 mg daily for 4 days, and Vantin for 5 days\par - tessalon pearls for 7 days\par \par # DM1: Ha1c 8.2%, follows up with Dr. Varela (endo)\par \par # tachycardia: f/u with doctor alexander, metoprolol increased to 50 mg bid\par \par # recurrent UTI w/ pelvic pain.\par - pt had nerve block with Dr. Fajardo for pelvic pain, however it did not work. The pt went to the ED for severe pain.\par - pain management recommends spinal cord stimulator \par - cytoscopy w/Dr. Meyer at end of this month\par \par # TMJ pain/ left ear pain \par - follows with ENT, they recommended dental eval\par \par # HCM \par - pap smear 8/2018- negative pt pt.\par - pt refuses influenza due to severe adverse reaction per pt\par - RTC in 3 months

## 2019-06-12 NOTE — PHYSICAL EXAM
[No Acute Distress] : no acute distress [Well Developed] : well developed [Well-Appearing] : well-appearing [PERRL] : pupils equal round and reactive to light [Normal Sclera/Conjunctiva] : normal sclera/conjunctiva [EOMI] : extraocular movements intact [Normal Outer Ear/Nose] : the outer ears and nose were normal in appearance [Normal Oropharynx] : the oropharynx was normal [No Accessory Muscle Use] : no accessory muscle use [No Respiratory Distress] : no respiratory distress  [Normal Rate] : normal rate  [Normal S1, S2] : normal S1 and S2 [Soft] : abdomen soft [Non Tender] : non-tender [No HSM] : no HSM [Normal Posterior Cervical Nodes] : no posterior cervical lymphadenopathy [Normal Anterior Cervical Nodes] : no anterior cervical lymphadenopathy [No CVA Tenderness] : no CVA  tenderness [No Spinal Tenderness] : no spinal tenderness [Grossly Normal Strength/Tone] : grossly normal strength/tone [No Rash] : no rash [Normal Gait] : normal gait [Normal Affect] : the affect was normal [Normal Insight/Judgement] : insight and judgment were intact [de-identified] : R lung crackles, + cough [de-identified] : tachycardic
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
## 2019-06-12 NOTE — REVIEW OF SYSTEMS
[Fever] : fever [Chills] : chills [Earache] : earache [Nasal Discharge] : nasal discharge [Sore Throat] : sore throat [Chest Pain] : chest pain [Palpitations] : palpitations [Cough] : cough [Headache] : headache [Dizziness] : dizziness [Anxiety] : anxiety [Negative] : Heme/Lymph [Fatigue] : no fatigue [Hot Flashes] : no hot flashes [Night Sweats] : no night sweats [Recent Change In Weight] : ~T no recent weight change [Nosebleed] : no nosebleeds [Hoarseness] : no hoarseness [Postnasal Drip] : no postnasal drip [Leg Claudication] : no leg claudication [Lower Ext Edema] : no lower extremity edema [Orthopnea] : no orthopnea [Paroysmal Nocturnal Dyspnea] : no paroysmal nocturnal dyspnea [Shortness Of Breath] : no shortness of breath [Wheezing] : no wheezing [Nausea] : no nausea [Vomiting] : no vomiting [Heartburn] : no heartburn [Dysuria] : no dysuria [Incontinence] : no incontinence [Nocturia] : no nocturia [Hematuria] : no hematuria [Frequency] : no frequency [Vaginal Discharge] : no vaginal discharge [Muscle Pain] : no muscle pain [Back Pain] : no back pain [Suicidal] : not suicidal [Insomnia] : no insomnia [FreeTextEntry4] : ear aches from TMJ [FreeTextEntry9] : back pain-fell off air matress unto coccyx [de-identified] : social issues

## 2019-06-14 ENCOUNTER — RX RENEWAL (OUTPATIENT)
Age: 34
End: 2019-06-14

## 2019-06-17 ENCOUNTER — LABORATORY RESULT (OUTPATIENT)
Age: 34
End: 2019-06-17

## 2019-06-17 ENCOUNTER — OUTPATIENT (OUTPATIENT)
Dept: OUTPATIENT SERVICES | Facility: HOSPITAL | Age: 34
LOS: 1 days | Discharge: HOME | End: 2019-06-17

## 2019-06-17 DIAGNOSIS — E10.9 TYPE 1 DIABETES MELLITUS WITHOUT COMPLICATIONS: ICD-10-CM

## 2019-06-27 ENCOUNTER — LABORATORY RESULT (OUTPATIENT)
Age: 34
End: 2019-06-27

## 2019-06-27 ENCOUNTER — APPOINTMENT (OUTPATIENT)
Dept: UROLOGY | Facility: CLINIC | Age: 34
End: 2019-06-27
Payer: MEDICAID

## 2019-06-27 ENCOUNTER — OUTPATIENT (OUTPATIENT)
Dept: OUTPATIENT SERVICES | Facility: HOSPITAL | Age: 34
LOS: 1 days | Discharge: HOME | End: 2019-06-27

## 2019-06-27 VITALS — WEIGHT: 170 LBS | BODY MASS INDEX: 29.18 KG/M2 | DIASTOLIC BLOOD PRESSURE: 87 MMHG | SYSTOLIC BLOOD PRESSURE: 133 MMHG

## 2019-06-27 PROCEDURE — 99213 OFFICE O/P EST LOW 20 MIN: CPT

## 2019-06-27 NOTE — PHYSICAL EXAM
[General Appearance - Well Developed] : well developed [General Appearance - Well Nourished] : well nourished [Normal Appearance] : normal appearance [Well Groomed] : well groomed [Abdomen Soft] : soft [General Appearance - In No Acute Distress] : no acute distress [Costovertebral Angle Tenderness] : no ~M costovertebral angle tenderness [Skin Color & Pigmentation] : normal skin color and pigmentation [Affect] : the affect was normal [] : no respiratory distress [Oriented To Time, Place, And Person] : oriented to person, place, and time [Not Anxious] : not anxious [Normal Station and Gait] : the gait and station were normal for the patient's age [Mood] : the mood was normal [No Focal Deficits] : no focal deficits

## 2019-06-28 ENCOUNTER — APPOINTMENT (OUTPATIENT)
Dept: ENDOCRINOLOGY | Facility: CLINIC | Age: 34
End: 2019-06-28
Payer: MEDICAID

## 2019-06-28 ENCOUNTER — OUTPATIENT (OUTPATIENT)
Dept: OUTPATIENT SERVICES | Facility: HOSPITAL | Age: 34
LOS: 1 days | Discharge: HOME | End: 2019-06-28

## 2019-06-28 VITALS
HEIGHT: 64 IN | BODY MASS INDEX: 29.02 KG/M2 | DIASTOLIC BLOOD PRESSURE: 75 MMHG | HEART RATE: 80 BPM | SYSTOLIC BLOOD PRESSURE: 125 MMHG | WEIGHT: 170 LBS

## 2019-06-28 PROCEDURE — 99215 OFFICE O/P EST HI 40 MIN: CPT

## 2019-06-28 RX ORDER — INSULIN LISPRO 100 [IU]/ML
100 INJECTION, SOLUTION INTRAVENOUS; SUBCUTANEOUS
Qty: 1.2 | Refills: 5 | Status: DISCONTINUED | COMMUNITY
Start: 2017-05-12 | End: 2019-06-28

## 2019-06-28 RX ORDER — AZITHROMYCIN 500 MG/1
500 TABLET, FILM COATED ORAL ONCE
Qty: 1 | Refills: 0 | Status: DISCONTINUED | COMMUNITY
Start: 2019-06-06 | End: 2019-06-28

## 2019-06-28 RX ORDER — FAMOTIDINE 40 MG/1
40 TABLET, FILM COATED ORAL TWICE DAILY
Qty: 120 | Refills: 0 | Status: DISCONTINUED | COMMUNITY
Start: 2018-12-07 | End: 2019-06-28

## 2019-06-28 RX ORDER — AZITHROMYCIN 250 MG/1
250 TABLET, FILM COATED ORAL DAILY
Qty: 1 | Refills: 0 | Status: DISCONTINUED | COMMUNITY
Start: 2019-06-06 | End: 2019-06-28

## 2019-06-28 RX ORDER — CEFPODOXIME PROXETIL 200 MG/1
200 TABLET, FILM COATED ORAL TWICE DAILY
Qty: 10 | Refills: 0 | Status: DISCONTINUED | COMMUNITY
Start: 2019-06-06 | End: 2019-06-28

## 2019-06-28 RX ORDER — FAMOTIDINE 40 MG/1
40 TABLET, FILM COATED ORAL TWICE DAILY
Qty: 60 | Refills: 0 | Status: DISCONTINUED | COMMUNITY
Start: 2019-03-01 | End: 2019-06-28

## 2019-06-28 NOTE — HISTORY OF PRESENT ILLNESS
[FreeTextEntry1] : 33 y/o F PMHx IDDM on insulin pump, recurrent UTI, Vulvodynia, TMJ last seen in clinic 3/22/19 presenting for followup. \par Since last visit pt notes having fluctuating glucose levels difficult to control worsened by recent UTI and PNA (with steroid treatment) and family stress due to housing problems. She was also diagnosed with PCOS and brought lab work done by her OBGYN showing elevated testosterone levels. \par Pt notes improved diet compliance with decreased cheating and increased exercise leading to a few pounds weight loss. \par

## 2019-06-28 NOTE — PHYSICAL EXAM
[Alert] : alert [No Acute Distress] : no acute distress [Normal Sclera/Conjunctiva] : normal sclera/conjunctiva [Normal Rate and Effort] : normal respiratory rhythm and effort [No Accessory Muscle Use] : no accessory muscle use [No Respiratory Distress] : no respiratory distress [Normal Rate] : heart rate was normal  [Normal S1, S2] : normal S1 and S2 [Clear to Auscultation] : lungs were clear to auscultation bilaterally [Regular Rhythm] : with a regular rhythm [No Edema] : there was no peripheral edema [Not Tender] : non-tender [Soft] : abdomen soft [Not Distended] : not distended [Oriented x3] : oriented to person, place, and time [Normal Insight/Judgement] : insight and judgment were intact [Normal Affect] : the affect was normal [Hirsutism] : hirsutism [de-identified] : mild facial hair on chin (pt notes that this is not new)

## 2019-06-28 NOTE — ASSESSMENT
[FreeTextEntry1] : 33 y/o F PMHx IDDM on insulin pump, PCOS, recurrent UTI, Vulvodynia last seen in clinic 3/22/19 presenting for followup. \par \par # INDM\par -Hgb A1c 6/2019: 8.5%\par -c/w FS diary\par -c/w insulin pump\par -repeat Hgb A1c in 3 months\par -pt recently saw opthalmology and podiatry\par -RTC in 3 months\par \par # PCOS\par -pt notes US at OBGYN consistent with PCOS\par -mild hirsutism noted on exam\par -elevated testosterone level 66\par -no current intervention from endocrinology\par

## 2019-06-28 NOTE — REVIEW OF SYSTEMS
[Palpitations] : palpitations [Stress] : stress [Anxiety] : anxiety [Negative] : Musculoskeletal [Depression] : no depression [FreeTextEntry4] : TMJ [FreeTextEntry5] : during exercise

## 2019-07-02 ENCOUNTER — FORM ENCOUNTER (OUTPATIENT)
Age: 34
End: 2019-07-02

## 2019-07-02 LAB
APPEARANCE: CLEAR
BACTERIA UR CULT: NORMAL
BILIRUBIN URINE: NEGATIVE
BLOOD URINE: NEGATIVE
COLOR: YELLOW
GLUCOSE QUALITATIVE U: NEGATIVE
KETONES URINE: NEGATIVE
LEUKOCYTE ESTERASE URINE: NEGATIVE
NITRITE URINE: NEGATIVE
PH URINE: 6
PROTEIN URINE: 30
SPECIFIC GRAVITY URINE: >=1.03
UROBILINOGEN URINE: 0.2 (ref 0.2–?)

## 2019-07-03 ENCOUNTER — OUTPATIENT (OUTPATIENT)
Dept: OUTPATIENT SERVICES | Facility: HOSPITAL | Age: 34
LOS: 1 days | Discharge: HOME | End: 2019-07-03
Payer: MEDICAID

## 2019-07-03 DIAGNOSIS — N39.0 URINARY TRACT INFECTION, SITE NOT SPECIFIED: ICD-10-CM

## 2019-07-03 PROCEDURE — 76857 US EXAM PELVIC LIMITED: CPT | Mod: 26

## 2019-07-06 NOTE — HISTORY OF PRESENT ILLNESS
[Dysuria] : dysuria [3] : 3 [FreeTextEntry1] : 35 yo female presents for f/u with mom, pt was seen 1 year ago, she was treated for uti at that time and has not followed up since. 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 Pt has seen multiple specialists for this issue and has tried various treatment modalities.Pt states she tried Gabapentin, Cymbalta and Amitriptyline in past and either the medication didn't work or she developed a side effect from the medication. In November 2018 pt was seen in [facility] by physical therapy where she underwent pelvic floor exercises and pelvic floor biofeedback w/o any improvement in her symptoms. Pt was referred to pain management where in 3/2019 she underwent a pudendal block, as per pt she had severe pain after the injection and no relief of symptoms. Pt was referred to  by Dr. Christine Leslie for cystoscopy to evaluate the chronic dysuria. Of note, pt is a Type 1 diabetic, has insulin pump, but her HgA1c is 8.5%, pt is poorly controlled, she sees endo already\par \par Pt states she had UTI treated in May\par \par

## 2019-07-06 NOTE — ASSESSMENT
[Urinary Symptom or Sign (788.99\R39.89)] : implantation [FreeTextEntry1] : 33 yo female presents for f/u with mom, pt was seen 1 year ago, she was treated for uti at that time and has not followed up since. 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 Pt has seen multiple specialists for this issue and has tried various treatment modalities.Pt states she tried Gabapentin, Cymbalta and Amitriptyline in past and either the medication didn't work or she developed a side effect from the medication. In November 2018 pt was seen in [facility] by physical therapy where she underwent pelvic floor exercises and pelvic floor biofeedback w/o any improvement in her symptoms. Pt was referred to pain management where in 3/2019 she underwent a pudendal block, as per pt she had severe pain after the injection and no relief of symptoms. Pt was referred to  by Dr. Christine Leslie for cystoscopy to evaluate the chronic dysuria. Of note, pt is a Type 1 diabetic, has insulin pump, but her HgA1c is 8.5%, pt is poorly controlled, she sees endo already\par \par Pt states she had UTI treated in May\par \par Pt has  issues which are multifactorial, pt may have neuropathic pain from uncontrolled diabetes, pt may have pudendal neuralgia or Interstitial Cystitis, she will most likely need a cystoscopic evaluation, with pt's extensive past hx and having seen a urogynecologist Dr. Cathy Ramos in past, we will refer pt to Dr. Kinney for re-evaluation\par \par all of mom's and pt's questions were fully answered  \par \par plan\par -ua, ucx, cytology\par -renal/bladder sonogram with pvr\par - CONSULTATION WITH DR.SARAH KINNEY UROGYNECOLOGIST \par \par

## 2019-07-10 ENCOUNTER — OUTPATIENT (OUTPATIENT)
Dept: OUTPATIENT SERVICES | Facility: HOSPITAL | Age: 34
LOS: 1 days | Discharge: HOME | End: 2019-07-10

## 2019-07-10 ENCOUNTER — APPOINTMENT (OUTPATIENT)
Dept: INTERNAL MEDICINE | Facility: CLINIC | Age: 34
End: 2019-07-10

## 2019-07-10 VITALS
BODY MASS INDEX: 29.19 KG/M2 | WEIGHT: 171 LBS | HEART RATE: 102 BPM | SYSTOLIC BLOOD PRESSURE: 109 MMHG | TEMPERATURE: 98.4 F | DIASTOLIC BLOOD PRESSURE: 76 MMHG | HEIGHT: 64 IN

## 2019-07-10 NOTE — HISTORY OF PRESENT ILLNESS
[FreeTextEntry1] : zach f/u  [de-identified] : 34 F with PMHx of chronic pelvic pain 2/2 vulvodynia/ spasm. type 1 DM, recurrent UTIs, PCOS, s/p nerve block for vulvodynia which did not improve her sxs, she was recommended for spinal stimulator which the pt is not interesting in trying at this time. The pt has palpitations she follows up with cardiology, urology, endocrinology for these problems. The pt was recently seen by a gyn (private). The pt still c/o vulvadynia and dysuria. She saw Dr. Meyer who recommended she sees UroGyn to evaluate for interstitial cystitis.  The pt recently saw Dr. Velasquez who tested her thyroid, and states that everything is normal. \par \par Of note, the pt was in the office in June for persistent URI, treated with azithromycin and her sxs resolved. \par

## 2019-07-10 NOTE — ASSESSMENT
[FreeTextEntry1] : 34 F with PMHx of chronic pelvic pain 2/2 vulvodynia/ spasm. type 1 DM, recurrent UTIs, PCOS, s/p nerve block for vulvodynia \par \par # URI- resolved \par - CXR negative \par - completed course of abx \par \par # DM1: Ha1c 8.5%, follows up with Dr. Varela (endo)\par - per endo, no changes in current DM regimen \par \par # tachycardia: f/u with doctor tonja, metoprolol increased to 50 mg bid\par \par # recurrent UTI w/ pelvic pain.\par - pt had nerve block with Dr. Fajardo for pelvic pain, however it did not work. The pt went to the ED for severe pain.\par - pain management recommends spinal cord stimulator, pt not interested in this\par - pt to go see UroGyn to evaluate for interstitial cystitis \par \par # TMJ pain/ left ear pain \par - follows with ENT, they recommended dental eval, pt will go see dental eval \par \par # HCM \par - pap smear 8/2018- negative pt pt.\par - pt refuses influenza due to severe adverse reaction per pt\par - RTC in 6 months

## 2019-07-10 NOTE — PHYSICAL EXAM
[No Acute Distress] : no acute distress [Well Nourished] : well nourished [Well Developed] : well developed [Well-Appearing] : well-appearing [Normal Sclera/Conjunctiva] : normal sclera/conjunctiva [EOMI] : extraocular movements intact [Normal Outer Ear/Nose] : the outer ears and nose were normal in appearance [PERRL] : pupils equal round and reactive to light [No Lymphadenopathy] : no lymphadenopathy [Normal Oropharynx] : the oropharynx was normal [No JVD] : no jugular venous distention [Supple] : supple [Thyroid Normal, No Nodules] : the thyroid was normal and there were no nodules present [Clear to Auscultation] : lungs were clear to auscultation bilaterally [No Respiratory Distress] : no respiratory distress  [No Accessory Muscle Use] : no accessory muscle use [Normal Rate] : normal rate  [Normal S1, S2] : normal S1 and S2 [Regular Rhythm] : with a regular rhythm [No Abdominal Bruit] : a ~M bruit was not heard ~T in the abdomen [No Carotid Bruits] : no carotid bruits [No Murmur] : no murmur heard [Pedal Pulses Present] : the pedal pulses are present [No Varicosities] : no varicosities [No Edema] : there was no peripheral edema [No Extremity Clubbing/Cyanosis] : no extremity clubbing/cyanosis [No Palpable Aorta] : no palpable aorta [Soft] : abdomen soft [Non Tender] : non-tender [No Masses] : no abdominal mass palpated [Non-distended] : non-distended [No HSM] : no HSM [Normal Bowel Sounds] : normal bowel sounds [Normal Anterior Cervical Nodes] : no anterior cervical lymphadenopathy [No CVA Tenderness] : no CVA  tenderness [Normal Posterior Cervical Nodes] : no posterior cervical lymphadenopathy [No Joint Swelling] : no joint swelling [Grossly Normal Strength/Tone] : grossly normal strength/tone [No Spinal Tenderness] : no spinal tenderness [No Rash] : no rash [Coordination Grossly Intact] : coordination grossly intact [No Focal Deficits] : no focal deficits [Normal Affect] : the affect was normal [Normal Gait] : normal gait [Deep Tendon Reflexes (DTR)] : deep tendon reflexes were 2+ and symmetric [Normal Insight/Judgement] : insight and judgment were intact [de-identified] : left ear canal redness with small abrasion, c/o pain

## 2019-07-10 NOTE — REVIEW OF SYSTEMS
[Dysuria] : dysuria [Negative] : Heme/Lymph [Hematuria] : no hematuria [Frequency] : no frequency [Vaginal Discharge] : no vaginal discharge

## 2019-07-10 NOTE — PAST MEDICAL HISTORY
[Menstruating] : menstruating [Normal Amount/Duration] : it was of a normal amount and duration [Normal Duration] : the duration was normal

## 2019-07-11 DIAGNOSIS — E10.9 TYPE 1 DIABETES MELLITUS WITHOUT COMPLICATIONS: ICD-10-CM

## 2019-07-11 DIAGNOSIS — Z00.00 ENCOUNTER FOR GENERAL ADULT MEDICAL EXAMINATION WITHOUT ABNORMAL FINDINGS: ICD-10-CM

## 2019-07-11 DIAGNOSIS — K21.9 GASTRO-ESOPHAGEAL REFLUX DISEASE WITHOUT ESOPHAGITIS: ICD-10-CM

## 2019-07-11 DIAGNOSIS — R00.0 TACHYCARDIA, UNSPECIFIED: ICD-10-CM

## 2019-07-11 DIAGNOSIS — J18.9 PNEUMONIA, UNSPECIFIED ORGANISM: ICD-10-CM

## 2019-07-22 ENCOUNTER — RECORD ABSTRACTING (OUTPATIENT)
Age: 34
End: 2019-07-22

## 2019-07-22 DIAGNOSIS — N83.202 UNSPECIFIED OVARIAN CYST, LEFT SIDE: ICD-10-CM

## 2019-07-22 LAB — CYTOLOGY CVX/VAG DOC THIN PREP: NORMAL

## 2019-07-23 ENCOUNTER — APPOINTMENT (OUTPATIENT)
Dept: OBGYN | Facility: CLINIC | Age: 34
End: 2019-07-23
Payer: MEDICAID

## 2019-07-23 VITALS
BODY MASS INDEX: 29.02 KG/M2 | SYSTOLIC BLOOD PRESSURE: 112 MMHG | HEIGHT: 64 IN | DIASTOLIC BLOOD PRESSURE: 68 MMHG | WEIGHT: 170 LBS

## 2019-07-23 DIAGNOSIS — R79.89 OTHER SPECIFIED ABNORMAL FINDINGS OF BLOOD CHEMISTRY: ICD-10-CM

## 2019-07-23 DIAGNOSIS — N92.6 IRREGULAR MENSTRUATION, UNSPECIFIED: ICD-10-CM

## 2019-07-23 LAB
GLUCOSE UR-MCNC: NORMAL
HGB UR QL STRIP.AUTO: NORMAL
LEUKOCYTE ESTERASE UR QL STRIP: NORMAL
PROT UR STRIP-MCNC: NORMAL

## 2019-07-23 PROCEDURE — 81002 URINALYSIS NONAUTO W/O SCOPE: CPT

## 2019-07-23 PROCEDURE — 99214 OFFICE O/P EST MOD 30 MIN: CPT | Mod: 25

## 2019-07-26 ENCOUNTER — APPOINTMENT (OUTPATIENT)
Dept: UROGYNECOLOGY | Facility: CLINIC | Age: 34
End: 2019-07-26
Payer: MEDICAID

## 2019-07-26 ENCOUNTER — OUTPATIENT (OUTPATIENT)
Dept: OUTPATIENT SERVICES | Facility: HOSPITAL | Age: 34
LOS: 1 days | Discharge: HOME | End: 2019-07-26

## 2019-07-26 VITALS
SYSTOLIC BLOOD PRESSURE: 122 MMHG | HEIGHT: 64 IN | WEIGHT: 170 LBS | DIASTOLIC BLOOD PRESSURE: 84 MMHG | BODY MASS INDEX: 29.02 KG/M2

## 2019-07-26 DIAGNOSIS — B96.89 ACUTE VAGINITIS: ICD-10-CM

## 2019-07-26 DIAGNOSIS — Z86.69 PERSONAL HISTORY OF OTHER DISEASES OF THE NERVOUS SYSTEM AND SENSE ORGANS: ICD-10-CM

## 2019-07-26 DIAGNOSIS — H65.90 UNSPECIFIED NONSUPPURATIVE OTITIS MEDIA, UNSPECIFIED EAR: ICD-10-CM

## 2019-07-26 DIAGNOSIS — Z87.898 PERSONAL HISTORY OF OTHER SPECIFIED CONDITIONS: ICD-10-CM

## 2019-07-26 DIAGNOSIS — M79.18 MYALGIA, OTHER SITE: ICD-10-CM

## 2019-07-26 DIAGNOSIS — M79.672 PAIN IN LEFT FOOT: ICD-10-CM

## 2019-07-26 DIAGNOSIS — H60.90 UNSPECIFIED OTITIS EXTERNA, UNSPECIFIED EAR: ICD-10-CM

## 2019-07-26 DIAGNOSIS — Z01.419 ENCOUNTER FOR GYNECOLOGICAL EXAMINATION (GENERAL) (ROUTINE) W/OUT ABNORMAL FINDINGS: ICD-10-CM

## 2019-07-26 DIAGNOSIS — M79.671 PAIN IN RIGHT FOOT: ICD-10-CM

## 2019-07-26 DIAGNOSIS — R49.0 DYSPHONIA: ICD-10-CM

## 2019-07-26 DIAGNOSIS — N76.0 ACUTE VAGINITIS: ICD-10-CM

## 2019-07-26 DIAGNOSIS — E28.2 POLYCYSTIC OVARIAN SYNDROME: ICD-10-CM

## 2019-07-26 DIAGNOSIS — Z87.42 PERSONAL HISTORY OF OTHER DISEASES OF THE FEMALE GENITAL TRACT: ICD-10-CM

## 2019-07-26 DIAGNOSIS — K21.9 GASTRO-ESOPHAGEAL REFLUX DISEASE W/OUT ESOPHAGITIS: ICD-10-CM

## 2019-07-26 DIAGNOSIS — L84 CORNS AND CALLOSITIES: ICD-10-CM

## 2019-07-26 DIAGNOSIS — Z87.39 PERSONAL HISTORY OF OTHER DISEASES OF THE MUSCULOSKELETAL SYSTEM AND CONNECTIVE TISSUE: ICD-10-CM

## 2019-07-26 DIAGNOSIS — B37.9 CANDIDIASIS, UNSPECIFIED: ICD-10-CM

## 2019-07-26 DIAGNOSIS — N39.0 URINARY TRACT INFECTION, SITE NOT SPECIFIED: ICD-10-CM

## 2019-07-26 DIAGNOSIS — H66.90 OTITIS MEDIA, UNSPECIFIED, UNSPECIFIED EAR: ICD-10-CM

## 2019-07-26 DIAGNOSIS — Z86.19 PERSONAL HISTORY OF OTHER INFECTIOUS AND PARASITIC DISEASES: ICD-10-CM

## 2019-07-26 DIAGNOSIS — J45.909 UNSPECIFIED ASTHMA, UNCOMPLICATED: ICD-10-CM

## 2019-07-26 DIAGNOSIS — R10.2 PELVIC AND PERINEAL PAIN: ICD-10-CM

## 2019-07-26 DIAGNOSIS — Z87.19 PERSONAL HISTORY OF OTHER DISEASES OF THE DIGESTIVE SYSTEM: ICD-10-CM

## 2019-07-26 DIAGNOSIS — Z87.01 PERSONAL HISTORY OF PNEUMONIA (RECURRENT): ICD-10-CM

## 2019-07-26 DIAGNOSIS — H92.10 OTORRHEA, UNSPECIFIED EAR: ICD-10-CM

## 2019-07-26 DIAGNOSIS — R39.9 UNSPECIFIED SYMPTOMS AND SIGNS INVOLVING THE GENITOURINARY SYSTEM: ICD-10-CM

## 2019-07-26 DIAGNOSIS — Z87.440 PERSONAL HISTORY OF URINARY (TRACT) INFECTIONS: ICD-10-CM

## 2019-07-26 DIAGNOSIS — E11.42 TYPE 2 DIABETES MELLITUS WITH DIABETIC POLYNEUROPATHY: ICD-10-CM

## 2019-07-26 DIAGNOSIS — H61.20 IMPACTED CERUMEN, UNSPECIFIED EAR: ICD-10-CM

## 2019-07-26 DIAGNOSIS — L29.2 PRURITUS VULVAE: ICD-10-CM

## 2019-07-26 DIAGNOSIS — Z78.9 OTHER SPECIFIED HEALTH STATUS: ICD-10-CM

## 2019-07-26 DIAGNOSIS — Z87.09 PERSONAL HISTORY OF OTHER DISEASES OF THE RESPIRATORY SYSTEM: ICD-10-CM

## 2019-07-26 DIAGNOSIS — A49.8 OTHER BACTERIAL INFECTIONS OF UNSPECIFIED SITE: ICD-10-CM

## 2019-07-26 DIAGNOSIS — S03.03XS: ICD-10-CM

## 2019-07-26 PROCEDURE — 99205 OFFICE O/P NEW HI 60 MIN: CPT

## 2019-07-26 RX ORDER — BENZONATATE 100 MG/1
100 CAPSULE ORAL 3 TIMES DAILY
Qty: 21 | Refills: 0 | Status: DISCONTINUED | COMMUNITY
Start: 2019-06-06 | End: 2019-07-26

## 2019-07-26 RX ORDER — OFLOXACIN OTIC 3 MG/ML
0.3 SOLUTION AURICULAR (OTIC)
Qty: 1 | Refills: 0 | Status: DISCONTINUED | COMMUNITY
Start: 2019-07-10 | End: 2019-07-26

## 2019-07-26 RX ORDER — CIPROFLOXACIN HYDROCHLORIDE AND HYDROCORTISONE 2; 10 MG/ML; MG/ML
0.2-1 SUSPENSION/ DROPS AURICULAR (OTIC) TWICE DAILY
Qty: 1 | Refills: 0 | Status: DISCONTINUED | COMMUNITY
Start: 2019-07-10 | End: 2019-07-26

## 2019-07-29 ENCOUNTER — RESULT REVIEW (OUTPATIENT)
Age: 34
End: 2019-07-29

## 2019-07-29 LAB
APPEARANCE: CLEAR
BACTERIA UR CULT: NORMAL
BILIRUBIN URINE: NEGATIVE
BLOOD URINE: NEGATIVE
CANDIDA VAG CYTO: NORMAL
COLOR: COLORLESS
G VAGINALIS+PREV SP MTYP VAG QL MICRO: NORMAL
GLUCOSE QUALITATIVE U: NEGATIVE
KETONES URINE: NEGATIVE
LEUKOCYTE ESTERASE URINE: NEGATIVE
NITRITE URINE: NEGATIVE
PH URINE: 7
PROTEIN URINE: NEGATIVE
SPECIFIC GRAVITY URINE: 1
T VAGINALIS VAG QL WET PREP: NORMAL
UROBILINOGEN URINE: NORMAL

## 2019-07-29 NOTE — HISTORY OF PRESENT ILLNESS
[FreeTextEntry1] : \par Pt with pelvic floor dysfunction here for urogynecologic evaluation. 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 3 months)\par s/p 3/19 pudendal nerve block- caused severe pain (Dr. Leslie on [facility]; through the buttocks), ended up in the ER\par s/p creams- Gabapentin (helped temporarily)\par 6/27/19 NGTD, micro neg\par Symptoms: itching and burning in the vagina. Tested at the hospital or by Dr. Monroy (always negative). Denies hematuria\par \par Pelvic organ prolapse: no bulge, denies splinting\par Stress urinary incontinence: denies\par Overactive bladder syndrome: hx of DM I (AIC 6/21/19 8.5), voids q few hours. Denies incontinence, urgency, and pain when she attempts to hold urine. Not bothered by frequency\par Voiding dysfunction: no Incomplete bladder emptying, no hesitancy \par Lower urinary tract/vaginal symptoms: as above UTIs per year, no hematuria, + dysuria, no bladder pain \par Fecal incontinence: denies\par Defecatory dysfunction: sausage\par Sexual dysfunction: not active (has never been active)\par Pelvic pain: on Elavil (75 mg, for the past 3 yrs), on Flexeril (1.5 years), constant burning in the vagina. Sitting aggravates the pain. Denies alleviating factors.\par Vaginal dryness: denies\par \par Her pelvic floor symptoms are significantly bothersome and negatively impacting her quality of life.

## 2019-07-29 NOTE — DISCUSSION/SUMMARY
[FreeTextEntry1] : \par Vulvodynia:\par Discussed the pathophysiology of the above condition. A handout was provided with greater detail. Management options were reviewed, including oral medications (neurontin or elavil) or estrogen cream treatment. We reviewed the risks, benefits, alternatives and indications of local estrogen therapy. She does opt to begin this therapy. I have given her a prescription and specific instructions on how to use the estrogen cream, applied with a finger at a low dose.

## 2019-07-30 DIAGNOSIS — N94.819 VULVODYNIA, UNSPECIFIED: ICD-10-CM

## 2019-08-02 ENCOUNTER — RX RENEWAL (OUTPATIENT)
Age: 34
End: 2019-08-02

## 2019-08-02 ENCOUNTER — RX CHANGE (OUTPATIENT)
Age: 34
End: 2019-08-02

## 2019-08-02 RX ORDER — FAMOTIDINE 10 MG/ML
1 INJECTION INTRAVENOUS
Qty: 0 | Refills: 0 | DISCHARGE

## 2019-08-08 ENCOUNTER — APPOINTMENT (OUTPATIENT)
Dept: NEUROLOGY | Facility: CLINIC | Age: 34
End: 2019-08-08
Payer: MEDICAID

## 2019-08-08 VITALS
DIASTOLIC BLOOD PRESSURE: 82 MMHG | WEIGHT: 172 LBS | HEART RATE: 94 BPM | BODY MASS INDEX: 29.37 KG/M2 | SYSTOLIC BLOOD PRESSURE: 127 MMHG | HEIGHT: 64 IN

## 2019-08-08 PROCEDURE — 99213 OFFICE O/P EST LOW 20 MIN: CPT

## 2019-08-08 NOTE — PHYSICAL EXAM
[FreeTextEntry1] : Neurological Exam: \par Mental status: Awake, alert and oriented x3.  Recent and remote memory intact.  Naming, repetition and comprehension intact.  Attention/concentration intact.  No dysarthria, no aphasia.  Fund of knowledge appropriate.  \par Cranial nerves: Pupils equally round and reactive to light, visual fields full, no nystagmus, extraocular muscles intact, V1 through V3 intact bilaterally and symmetric, face symmetric, hearing intact to finger rub, palate elevation symmetric, tongue was midline.\par Motor:  MRC grading 5/5 b/l UE/LE.   strength 5/5.  Normal tone and bulk.  No abnormal movements.  \par Sensation: Intact to light touch, proprioception, and pinprick. \par Coordination: No dysmetria on finger-to-nose and heel-to-shin.  No dysdiadokinesia.\par Reflexes: 2+ in bilateral UE/LE, downgoing toes bilaterally. (-) Humphreys.\par Gait: Narrow and steady. No ataxia.  Romberg negative\par \par

## 2019-08-08 NOTE — ASSESSMENT
[FreeTextEntry1] : PATIENT IS A 35 YO FEMALE WITH THE AFOREMENTIONED HX \par \par CONT INDOMETHACIN 50 MG PRN HA\par CONT FLEXERIL 5MG QHS AND ELAVIL 75 MG QHS FOR L/S RADICULOPATHY\par F/U IN 6 MOS

## 2019-08-08 NOTE — HISTORY OF PRESENT ILLNESS
[FreeTextEntry1] : PATIENT IS A 33 YO FEMALE WHO PRESENTS FOR F/U TODAY. PATIENT WITH DX OF MIGRAINE, ET, DIABETIC POLYNEUROPATHY, CLITORAL NEUROPATHY , L/S RADICULOPATHY\par PATIENT REPORTS THAT SHE IS STABLE\par FOLLOWING WITH UROGYN FOR VULVODYNOA\par HAD PUDENDAL NERVE BLOCK WHICH WAS NOT SUCCESSFUL\par MIGRAINES HAVE BEEN INTERMITTENT. RESPOND TO PRN INDOMETHACIN\par LBP CONTROLLED WITH ELAVIL AND FLEXERIL

## 2019-08-09 ENCOUNTER — RX RENEWAL (OUTPATIENT)
Age: 34
End: 2019-08-09

## 2019-08-16 ENCOUNTER — APPOINTMENT (OUTPATIENT)
Dept: GASTROENTEROLOGY | Facility: CLINIC | Age: 34
End: 2019-08-16
Payer: MEDICAID

## 2019-08-16 ENCOUNTER — OUTPATIENT (OUTPATIENT)
Dept: OUTPATIENT SERVICES | Facility: HOSPITAL | Age: 34
LOS: 1 days | Discharge: HOME | End: 2019-08-16

## 2019-08-16 VITALS
WEIGHT: 172 LBS | DIASTOLIC BLOOD PRESSURE: 86 MMHG | HEART RATE: 98 BPM | BODY MASS INDEX: 29.37 KG/M2 | SYSTOLIC BLOOD PRESSURE: 125 MMHG | HEIGHT: 64 IN

## 2019-08-16 PROCEDURE — 99214 OFFICE O/P EST MOD 30 MIN: CPT

## 2019-08-16 NOTE — REVIEW OF SYSTEMS
[Earache] : earache [As Noted in HPI] : as noted in HPI [Negative] : Neurological [Eye Pain] : no eye pain [Fever] : no fever [Chest Pain] : no chest pain [Shortness Of Breath] : no shortness of breath [Arthralgias] : no arthralgias [Dysuria] : no dysuria [Muscle Weakness] : no muscle weakness [Change In Personality] : no personality change [Easy Bleeding] : no tendency for easy bleeding

## 2019-08-16 NOTE — ASSESSMENT
[FreeTextEntry1] : 33 y/o F with PMHx chronic pelvic pain 2/2 vulvodynia/spasms, type I DM a/w neuropathy has insulin pump, acid reflux, recurrent UTI, tachycardia on metoprolol, R hand tremor, chronic headaches, degenerative disk dx, presenting for gerd symptoms. She is scheduled for egd but has questions about her diet before egd. She c/o heartburn, 1-2 x per week, not better on pepcid qd, with nausea, no wt loss. \par \par 1- Heartburn \par refused to take PPI because of possible worsening of her tremor  \par  Avoid NSAIDS\par Antireflux measures \par continue with pepcid 40mg qhs, can increase to twice daily \par hold off  egd in view of absence of alarm symptoms  \par If symptoms worsen, re-consider EGD\par \par 2- Questionable constipation\par patient unsure about frequency of her bms \par advised to keep diary about her bms \par high fiber diet

## 2019-08-16 NOTE — PHYSICAL EXAM
[General Appearance - Alert] : alert [Sclera] : the sclera and conjunctiva were normal [Neck Appearance] : the appearance of the neck was normal [Apical Impulse] : the apical impulse was normal [Bowel Sounds] : normal bowel sounds [Abdomen Soft] : soft [Abdomen Tenderness] : non-tender [No CVA Tenderness] : no ~M costovertebral angle tenderness [Abnormal Walk] : normal gait [Cranial Nerves] : cranial nerves 2-12 were intact [Outer Ear] : the ears and nose were normal in appearance [Edema] : there was no peripheral edema [] : no rash [Impaired Insight] : insight and judgment were intact

## 2019-08-16 NOTE — HISTORY OF PRESENT ILLNESS
[___ Month(s) Ago] : [unfilled] month(s) ago [None] : had no significant interval events [Adding Medication ___] : adding [unfilled] [Vomiting] : denies vomiting [Nausea] : denies nausea [Diarrhea] : denies diarrhea [Constipation] : denies constipation [Yellow Skin Or Eyes (Jaundice)] : denies jaundice [Abdominal Pain] : denies abdominal pain [Abdominal Swelling] : denies abdominal swelling [Rectal Pain] : denies rectal pain [Fair Compliance] : fair compliance with treatment [Heartburn] : heartburn [Wt Loss ___ Lbs] : no recent weight loss [de-identified] : \par \par \par  \par  [FreeTextEntry1] : 35 y/o F with PMHx chronic pelvic pain 2/2 vulvodynia/spasms, type I DM a/w neuropathy has insulin pump, acid reflux, recurrent UTI, tachycardia on metoprolol, R hand tremor, chronic headaches, degenerative disc dx, presenting for gerd symptoms. She is scheduled for egd but has questions about her diet before egd. She c/o heartburn, 1 x per week despite being off pepcid for 1 week.Not better when she is on pepcid qd, with nausea, no wt loss. \par \par no egd or colonoscopy\par FH ? adopted \par usually has 1 bm q1-6 days, no blood, good appetite

## 2019-08-20 DIAGNOSIS — K21.9 GASTRO-ESOPHAGEAL REFLUX DISEASE WITHOUT ESOPHAGITIS: ICD-10-CM

## 2019-08-20 NOTE — ED ADULT TRIAGE NOTE - AS HEIGHT TYPE
Parvez Osborn is a 45year old male who presents for a complete physical exam.   HPI:   Pt complains of nothing. 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  Has had testing done at [facility]. . fats closely     REVIEW OF SYSTEMS:     GENERAL: feels well otherwise  SKIN: denies any unusual skin lesions  EYES:denies blurred vision or double vision  HEENT: denies nasal congestion, sinus pain or ST  LUNGS: denies shortness of breath with exertion  CA self exam and prostate cancer screening. 2. Habitual snoring    Has habitual snoring wife states and seems to almost stop breathing at nite. Has brother with YUMIKO. Not much daytime sedation or other AM symptoms from the snoring.  Will check home sleep anastasiya stated

## 2019-08-23 ENCOUNTER — APPOINTMENT (OUTPATIENT)
Dept: GASTROENTEROLOGY | Facility: CLINIC | Age: 34
End: 2019-08-23

## 2019-08-23 ENCOUNTER — MEDICATION RENEWAL (OUTPATIENT)
Age: 34
End: 2019-08-23

## 2019-08-23 ENCOUNTER — OTHER (OUTPATIENT)
Age: 34
End: 2019-08-23

## 2019-08-23 ENCOUNTER — APPOINTMENT (OUTPATIENT)
Dept: PODIATRY | Facility: CLINIC | Age: 34
End: 2019-08-23
Payer: MEDICAID

## 2019-08-23 ENCOUNTER — OUTPATIENT (OUTPATIENT)
Dept: OUTPATIENT SERVICES | Facility: HOSPITAL | Age: 34
LOS: 1 days | Discharge: HOME | End: 2019-08-23

## 2019-08-23 VITALS
HEART RATE: 103 BPM | HEIGHT: 64 IN | SYSTOLIC BLOOD PRESSURE: 122 MMHG | DIASTOLIC BLOOD PRESSURE: 75 MMHG | BODY MASS INDEX: 29.37 KG/M2 | WEIGHT: 172 LBS

## 2019-08-23 PROCEDURE — 99213 OFFICE O/P EST LOW 20 MIN: CPT

## 2019-08-28 ENCOUNTER — APPOINTMENT (OUTPATIENT)
Dept: INTERNAL MEDICINE | Facility: CLINIC | Age: 34
End: 2019-08-28

## 2019-08-30 NOTE — ASSESSMENT
[FreeTextEntry1] : Patient examined, history and chart reviewed\par pinch callus dbx left 1st medial plantar using #15\par Patient tolerated procedure well\par patient educated on diabetic foot health and maintenance at length \par Follow up in 6 months or PRN\par \par

## 2019-08-30 NOTE — PHYSICAL EXAM
[General Appearance - In No Acute Distress] : in no acute distress [General Appearance - Alert] : alert [Abnormal Walk] : normal gait [Nail Clubbing] : no clubbing  or cyanosis of the fingernails [Involuntary Movements] : no involuntary movements were seen [Motor Tone] : muscle strength and tone were normal [Skin Color & Pigmentation] : normal skin color and pigmentation [] : no rash [Right Foot Was Examined] : The right foot was examined [Left Foot Was Examined] : The left foot was examined [Normal Appearance] : normal in appearance [Normal in Appearance] : normal in appearance [Tenderness] : tender [1+] : 1+ in the dorsalis pedis [Deep Tendon Reflexes (DTR)] : deep tendon reflexes were 2+ and symmetric [Sensation] : the sensory exam was normal to light touch and pinprick [No Focal Deficits] : no focal deficits [Oriented To Time, Place, And Person] : oriented to person, place, and time [Impaired Insight] : insight and judgment were intact [Affect] : the affect was normal [Delayed in the Right Toes] : normal in the toes [Full ROM] : with limited range of motion [Delayed in the Left Toes] : normal in the toes [FreeTextEntry1] : calluses, plantar heel B/L

## 2019-09-09 ENCOUNTER — LABORATORY RESULT (OUTPATIENT)
Age: 34
End: 2019-09-09

## 2019-09-09 ENCOUNTER — OUTPATIENT (OUTPATIENT)
Dept: OUTPATIENT SERVICES | Facility: HOSPITAL | Age: 34
LOS: 1 days | Discharge: HOME | End: 2019-09-09

## 2019-09-09 DIAGNOSIS — E10.9 TYPE 1 DIABETES MELLITUS WITHOUT COMPLICATIONS: ICD-10-CM

## 2019-09-18 ENCOUNTER — OUTPATIENT (OUTPATIENT)
Dept: OUTPATIENT SERVICES | Facility: HOSPITAL | Age: 34
LOS: 1 days | Discharge: HOME | End: 2019-09-18

## 2019-09-18 ENCOUNTER — APPOINTMENT (OUTPATIENT)
Dept: UROGYNECOLOGY | Facility: CLINIC | Age: 34
End: 2019-09-18
Payer: MEDICAID

## 2019-09-18 VITALS
BODY MASS INDEX: 29.37 KG/M2 | HEIGHT: 64 IN | WEIGHT: 172 LBS | SYSTOLIC BLOOD PRESSURE: 124 MMHG | DIASTOLIC BLOOD PRESSURE: 84 MMHG

## 2019-09-18 DIAGNOSIS — N89.8 OTHER SPECIFIED NONINFLAMMATORY DISORDERS OF VAGINA: ICD-10-CM

## 2019-09-18 PROCEDURE — 99213 OFFICE O/P EST LOW 20 MIN: CPT | Mod: 25

## 2019-09-18 PROCEDURE — 51702 INSERT TEMP BLADDER CATH: CPT

## 2019-09-18 NOTE — DISCUSSION/SUMMARY
[FreeTextEntry1] : \par Vulvodynia:\par Prescribed Amitriptyline 100 mg at bedtime\par Precautions reviewed\par Will follow up in 6 weeks or earlier if she has any issues\par \par Urinary Frequency:\par Urine culture obtained.\par Will follow up.\par Will treat accordingly if necessary \par \par Vaginal Itching:\par Aptima obtained.\par Will follow up.\par Will treat accordingly if necessary

## 2019-09-18 NOTE — HISTORY OF PRESENT ILLNESS
[FreeTextEntry1] : \par Patient is here for med check for vulvodynia\par Last seen 7/26/19 as NP for vulvodynia\par \par h/o DM I\par \par Diagnosed previously with vulvodynia and clitoral neuropathy and pudendal neuropathy\par \par On elavil 75mg, past 3 years\par On Flexeril, past 1.5 years\par s/p gabapentin- essential tremors (but tremors continued and still has them today)\par s/p cymbalta- no improvement\par s/p pelvic floor PT 11/18 no change in symptoms ([facility]. 3 months)\par s/p 3/19 pudendal nerve block- caused severe pain (Dr. Leslie on [facility]; through the buttocks), ended up in the ER\par s/p creams- Gabapentin (helped temporarily)\par \par Today, patient states for the past week, she has been experiencing dysuria and vaginal itching. Frequency since yesterday. Denies discharge, hematuria, fever, and confusion.\par \par Dr. Engle (Neurologist), states patient had worsening tremor when on gabapentin in the past. She recommended increase in Elavil. Dr. Engle prescribed Elavil 75 mg daily.\par \par Patient states she continued to have burning since using compound Estrace cream x 7 weeks. She does not think it is helping.

## 2019-09-18 NOTE — COUNSELING
[FreeTextEntry1] : \par We will contact you if the urine results are abnormal. \par \par We will contact you if the vaginal culture results are abnormal. \par \par Please continue to apply a pea size amount of the cream to the opening of the vagina three nights per week. \par \par Please begin taking Elavil 100 mg, at bedtime (vulvodynia). It takes up to 6 weeks to go into full effect. \par \par Please call my office if you have any issues with the cost or side effects of the medication. \par \par Schedule 6 week med check with TORY Grewal (Vulvodynia)

## 2019-09-22 LAB — BACTERIA UR CULT: NORMAL

## 2019-09-25 LAB
A VAGINAE DNA VAG QL NAA+PROBE: NORMAL
BVAB2 DNA VAG QL NAA+PROBE: NORMAL
C KRUSEI DNA VAG QL NAA+PROBE: NEGATIVE
C TRACH RRNA SPEC QL NAA+PROBE: NEGATIVE
MEGA1 DNA VAG QL NAA+PROBE: NORMAL
N GONORRHOEA RRNA SPEC QL NAA+PROBE: NEGATIVE
T VAGINALIS RRNA SPEC QL NAA+PROBE: NEGATIVE

## 2019-09-26 DIAGNOSIS — N94.819 VULVODYNIA, UNSPECIFIED: ICD-10-CM

## 2019-09-26 DIAGNOSIS — N89.8 OTHER SPECIFIED NONINFLAMMATORY DISORDERS OF VAGINA: ICD-10-CM

## 2019-09-26 DIAGNOSIS — R35.0 FREQUENCY OF MICTURITION: ICD-10-CM

## 2019-09-27 ENCOUNTER — APPOINTMENT (OUTPATIENT)
Dept: ENDOCRINOLOGY | Facility: CLINIC | Age: 34
End: 2019-09-27
Payer: MEDICAID

## 2019-09-27 ENCOUNTER — OUTPATIENT (OUTPATIENT)
Dept: OUTPATIENT SERVICES | Facility: HOSPITAL | Age: 34
LOS: 1 days | Discharge: HOME | End: 2019-09-27

## 2019-09-27 VITALS
HEART RATE: 78 BPM | SYSTOLIC BLOOD PRESSURE: 120 MMHG | HEIGHT: 64 IN | BODY MASS INDEX: 29.37 KG/M2 | WEIGHT: 172 LBS | DIASTOLIC BLOOD PRESSURE: 70 MMHG

## 2019-09-27 DIAGNOSIS — Z86.59 PERSONAL HISTORY OF OTHER MENTAL AND BEHAVIORAL DISORDERS: ICD-10-CM

## 2019-09-27 DIAGNOSIS — Z87.2 PERSONAL HISTORY OF DISEASES OF THE SKIN AND SUBCUTANEOUS TISSUE: ICD-10-CM

## 2019-09-27 DIAGNOSIS — G44.229 CHRONIC TENSION-TYPE HEADACHE, NOT INTRACTABLE: ICD-10-CM

## 2019-09-27 DIAGNOSIS — E11.10 TYPE 2 DIABETES MELLITUS WITH KETOACIDOSIS WITHOUT COMA: ICD-10-CM

## 2019-09-27 PROCEDURE — 99215 OFFICE O/P EST HI 40 MIN: CPT

## 2019-10-04 ENCOUNTER — TRANSCRIPTION ENCOUNTER (OUTPATIENT)
Age: 34
End: 2019-10-04

## 2019-10-04 NOTE — ASSESSMENT
[FreeTextEntry1] : Patient is a 35 y/o female presenting for a 3 month follow up for her type 1 DM. She is on a Humalog pump. Patient states she has felt fatigue and has been having headaches. She attributes the headaches to stress and has a pmh of migraines. Patient denies urinary frequency, excessive thirst, diaphoresis, lightheadedness, chest pain or SOB. Patient states she has had very few episodes of hypoglycemia, with the lowest with a glucose of 60. \par \par \par  INDM\par -Hgb A1c 6/2019: 8.0%\par -c/w FS diary\par -c/w insulin pump\par -repeat Hgb A1c in 3 months\par -pt recently saw opthalmology and podiatry\par -RTC in 3 months\par \par # PCOS\par -pt notes US at OBGYN consistent with PCOS\par -mild hirsutism noted on exam\par -elevated testosterone level 66\par -no current intervention from endocrinology\par

## 2019-10-04 NOTE — HISTORY OF PRESENT ILLNESS
[FreeTextEntry1] : Patient is a 33 y/o female presenting for a 3 month follow up for her type 1 DM. She is on a Humalog pump. Patient states she has felt fatigue and has been having headaches. She attributes the headaches to stress and has a pmh of migraines. Patient denies urinary frequency, excessive thirst, diaphoresis, lightheadedness, chest pain or SOB. Patient states she has had very few episodes of hypoglycemia, with the lowest with a glucose of 60.

## 2019-10-09 ENCOUNTER — APPOINTMENT (OUTPATIENT)
Dept: OTOLARYNGOLOGY | Facility: CLINIC | Age: 34
End: 2019-10-09
Payer: MEDICAID

## 2019-10-09 VITALS
DIASTOLIC BLOOD PRESSURE: 77 MMHG | HEIGHT: 64 IN | BODY MASS INDEX: 29.53 KG/M2 | SYSTOLIC BLOOD PRESSURE: 118 MMHG | WEIGHT: 173 LBS

## 2019-10-09 DIAGNOSIS — R09.89 OTHER SPECIFIED SYMPTOMS AND SIGNS INVOLVING THE CIRCULATORY AND RESPIRATORY SYSTEMS: ICD-10-CM

## 2019-10-09 PROCEDURE — 31575 DIAGNOSTIC LARYNGOSCOPY: CPT

## 2019-10-09 PROCEDURE — 99213 OFFICE O/P EST LOW 20 MIN: CPT | Mod: 25

## 2019-10-09 NOTE — PHYSICAL EXAM
[de-identified] : bilateral impacted wax cleaned with curette [Midline] : trachea located in midline position [Normal] : orientation to person, place, and time: normal

## 2019-10-09 NOTE — HISTORY OF PRESENT ILLNESS
[de-identified] : Patient feeling something stuck in throat. It started a week ago. she is on famotidine for acid reflux. She choked on a piece of egg then her throat started hurting.\par SHe went to the ER, told to start nystatin. this is her fourth day, she had no real improvement.\par no dysphonia. no dysphagia. No fever.

## 2019-10-09 NOTE — REASON FOR VISIT
[Subsequent Evaluation] : a subsequent evaluation for [FreeTextEntry2] : oral thrush, globus sensation

## 2019-10-12 ENCOUNTER — OUTPATIENT (OUTPATIENT)
Dept: OUTPATIENT SERVICES | Facility: HOSPITAL | Age: 34
LOS: 1 days | Discharge: HOME | End: 2019-10-12
Payer: MEDICAID

## 2019-10-12 DIAGNOSIS — R09.89 OTHER SPECIFIED SYMPTOMS AND SIGNS INVOLVING THE CIRCULATORY AND RESPIRATORY SYSTEMS: ICD-10-CM

## 2019-10-12 PROCEDURE — 70490 CT SOFT TISSUE NECK W/O DYE: CPT | Mod: 26

## 2019-10-24 ENCOUNTER — EMERGENCY (EMERGENCY)
Facility: HOSPITAL | Age: 34
LOS: 0 days | Discharge: HOME | End: 2019-10-24
Attending: EMERGENCY MEDICINE | Admitting: EMERGENCY MEDICINE
Payer: MEDICAID

## 2019-10-24 VITALS — OXYGEN SATURATION: 100 % | RESPIRATION RATE: 18 BRPM | HEART RATE: 98 BPM

## 2019-10-24 VITALS
WEIGHT: 171.96 LBS | HEART RATE: 112 BPM | RESPIRATION RATE: 17 BRPM | HEIGHT: 64 IN | OXYGEN SATURATION: 100 % | DIASTOLIC BLOOD PRESSURE: 88 MMHG | SYSTOLIC BLOOD PRESSURE: 148 MMHG | TEMPERATURE: 98 F

## 2019-10-24 DIAGNOSIS — Z88.7 ALLERGY STATUS TO SERUM AND VACCINE: ICD-10-CM

## 2019-10-24 DIAGNOSIS — J02.9 ACUTE PHARYNGITIS, UNSPECIFIED: ICD-10-CM

## 2019-10-24 DIAGNOSIS — Z88.0 ALLERGY STATUS TO PENICILLIN: ICD-10-CM

## 2019-10-24 DIAGNOSIS — R07.89 OTHER CHEST PAIN: ICD-10-CM

## 2019-10-24 DIAGNOSIS — Z88.1 ALLERGY STATUS TO OTHER ANTIBIOTIC AGENTS STATUS: ICD-10-CM

## 2019-10-24 LAB
ALBUMIN SERPL ELPH-MCNC: 4.6 G/DL — SIGNIFICANT CHANGE UP (ref 3.5–5.2)
ALP SERPL-CCNC: 102 U/L — SIGNIFICANT CHANGE UP (ref 30–115)
ALT FLD-CCNC: 21 U/L — SIGNIFICANT CHANGE UP (ref 0–41)
ANION GAP SERPL CALC-SCNC: 12 MMOL/L — SIGNIFICANT CHANGE UP (ref 7–14)
AST SERPL-CCNC: 20 U/L — SIGNIFICANT CHANGE UP (ref 0–41)
BILIRUB SERPL-MCNC: <0.2 MG/DL — SIGNIFICANT CHANGE UP (ref 0.2–1.2)
BUN SERPL-MCNC: 9 MG/DL — LOW (ref 10–20)
CALCIUM SERPL-MCNC: 9.9 MG/DL — SIGNIFICANT CHANGE UP (ref 8.5–10.1)
CHLORIDE SERPL-SCNC: 100 MMOL/L — SIGNIFICANT CHANGE UP (ref 98–110)
CO2 SERPL-SCNC: 28 MMOL/L — SIGNIFICANT CHANGE UP (ref 17–32)
CREAT SERPL-MCNC: 0.7 MG/DL — SIGNIFICANT CHANGE UP (ref 0.7–1.5)
GLUCOSE SERPL-MCNC: 69 MG/DL — LOW (ref 70–99)
HCT VFR BLD CALC: 41.8 % — SIGNIFICANT CHANGE UP (ref 37–47)
HGB BLD-MCNC: 14.2 G/DL — SIGNIFICANT CHANGE UP (ref 12–16)
INR BLD: 1.04 RATIO — SIGNIFICANT CHANGE UP (ref 0.65–1.3)
MCHC RBC-ENTMCNC: 30.7 PG — SIGNIFICANT CHANGE UP (ref 27–31)
MCHC RBC-ENTMCNC: 34 G/DL — SIGNIFICANT CHANGE UP (ref 32–37)
MCV RBC AUTO: 90.5 FL — SIGNIFICANT CHANGE UP (ref 81–99)
NRBC # BLD: 0 /100 WBCS — SIGNIFICANT CHANGE UP (ref 0–0)
PLATELET # BLD AUTO: 395 K/UL — SIGNIFICANT CHANGE UP (ref 130–400)
POTASSIUM SERPL-MCNC: 4.1 MMOL/L — SIGNIFICANT CHANGE UP (ref 3.5–5)
POTASSIUM SERPL-SCNC: 4.1 MMOL/L — SIGNIFICANT CHANGE UP (ref 3.5–5)
PROT SERPL-MCNC: 8.1 G/DL — HIGH (ref 6–8)
PROTHROM AB SERPL-ACNC: 12 SEC — SIGNIFICANT CHANGE UP (ref 9.95–12.87)
RBC # BLD: 4.62 M/UL — SIGNIFICANT CHANGE UP (ref 4.2–5.4)
RBC # FLD: 12.2 % — SIGNIFICANT CHANGE UP (ref 11.5–14.5)
SODIUM SERPL-SCNC: 140 MMOL/L — SIGNIFICANT CHANGE UP (ref 135–146)
TROPONIN T SERPL-MCNC: <0.01 NG/ML — SIGNIFICANT CHANGE UP
WBC # BLD: 10.75 K/UL — SIGNIFICANT CHANGE UP (ref 4.8–10.8)
WBC # FLD AUTO: 10.75 K/UL — SIGNIFICANT CHANGE UP (ref 4.8–10.8)

## 2019-10-24 PROCEDURE — 93010 ELECTROCARDIOGRAM REPORT: CPT

## 2019-10-24 PROCEDURE — 99285 EMERGENCY DEPT VISIT HI MDM: CPT

## 2019-10-24 PROCEDURE — 71046 X-RAY EXAM CHEST 2 VIEWS: CPT | Mod: 26

## 2019-10-24 NOTE — ED PROVIDER NOTE - PATIENT PORTAL LINK FT
You can access the FollowMyHealth Patient Portal offered by [facility] by registering at the following website: [email or web address]. By joining [employer]’s FollowMyHealth portal, you will also be able to view your health information using other applications (apps) compatible with our system.

## 2019-10-24 NOTE — ED PROVIDER NOTE - PROGRESS NOTE DETAILS
pt feeling better, drank 2 cups of water without effort at bedside without effort, (+) - US done at bedside pt feeling better, ready for d/c

## 2019-10-24 NOTE — ED PROVIDER NOTE - NSFOLLOWUPINSTRUCTIONS_ED_ALL_ED_FT
Nonspecific Chest Pain, Adult  Chest pain can be caused by many different conditions. It can be caused by a condition that is life-threatening and requires treatment right away. It can also be caused by something that is not life-threatening. If you have chest pain, it can be hard to know the difference, so it is important to get help right away to make sure that you do not have a serious condition.  Some life-threatening causes of chest pain include:  Heart attack.A tear in the body's main blood vessel (aortic dissection).Inflammation around your heart (pericarditis).A problem in the lungs, such as a blood clot (pulmonary embolism) or a collapsed lung (pneumothorax).Some non life-threatening causes of chest pain include:  Heartburn.Anxiety or stress.Damage to the bones, muscles, and cartilage that make up your chest wall.Pneumonia or bronchitis.Shingles infection (varicella-zoster virus).Chest pain can feel like:  Pain or discomfort on the surface of your chest or deep in your chest.Crushing, pressure, aching, or squeezing pain.Burning or tingling.Dull or sharp pain that is worse when you move, cough, or take a deep breath.Pain or discomfort that is also felt in your back, neck, jaw, shoulder, or arm, or pain that spreads to any of these areas.Your chest pain may come and go. It may also be constant. Your health care provider will do lab tests and other studies to find the cause of your pain. Treatment will depend on the cause of your chest pain.  Follow these instructions at home:  Medicines     Take over-the-counter and prescription medicines only as told by your health care provider.If you were prescribed an antibiotic, take it as told by your health care provider. Do not stop taking the antibiotic even if you start to feel better.Lifestyle     Image   Rest as directed by your health care provider.Do not use any products that contain nicotine or tobacco, such as cigarettes and e-cigarettes. If you need help quitting, ask your health care provider.Do not drink alcohol.Make healthy lifestyle choices as recommended. These may include:  Getting regular exercise. Ask your health care provider to suggest some activities that are safe for you.Eating a heart-healthy diet. This includes plenty of fresh fruits and vegetables, whole grains, low-fat (lean) protein, and low-fat dairy products. A dietitian can help you find healthy eating options.Maintaining a healthy weight.Managing any other health conditions you have, such as high blood pressure (hypertension) or diabetes.Reducing stress, such as with yoga or relaxation techniques.General instructions     Pay attention to any changes in your symptoms. Tell your health care provider about them or any new symptoms.Avoid any activities that cause chest pain.Keep all follow-up visits as told by your health care provider. This is important. This includes visits for any further testing if your chest pain does not go away.Contact a health care provider if:  Your chest pain does not go away.You feel depressed.You have a fever.Get help right away if:  Your chest pain gets worse.You have a cough that gets worse, or you cough up blood.You have severe pain in your abdomen.You faint.You have sudden, unexplained chest discomfort.You have sudden, unexplained discomfort in your arms, back, neck, or jaw.You have shortness of breath at any time.You suddenly start to sweat, or your skin gets clammy.You feel nausea or you vomit.You suddenly feel lightheaded or dizzy.You have severe weakness, or unexplained weakness or fatigue.Your heart begins to beat quickly, or it feels like it is skipping beats.These symptoms may represent a serious problem that is an emergency. Do not wait to see if the symptoms will go away. Get medical help right away. Call your local emergency services (911 in the [place] Do not drive yourself to the hospital.   Summary  Chest pain can be caused by a condition that is serious and requires urgent treatment. It may also be caused by something that is not life-threatening.If you have chest pain, it is very important to see your health care provider. Your health care provider may do lab tests and other studies to find the cause of your pain.Follow your health care provider's instructions on taking medicines, making lifestyle changes, and getting emergency treatment if symptoms become worse.Keep all follow-up visits as told by your health care provider. This includes visits for any further testing if your chest pain does not go away.This information is not intended to replace advice given to you by your health care provider. Make sure you discuss any questions you have with your health care provider.    Document Released: 09/27/2006 Document Revised: 06/20/2019 Document Reviewed: 06/20/2019  Elsevier Interactive Patient Education © 2019 [employer]

## 2019-10-24 NOTE — ED PROVIDER NOTE - ATTENDING CONTRIBUTION TO CARE
35 y/o F c/o of sore throat for one month for the past 5 days. Pain is in throat and radiates into her chest. Pt has no fevers, chills, SOB, or back pain. Exam: Well appearing, awake and alert. Posterior oropharynx clear with no exudates, MMM, no lymphadenopathy, Cardio – S1S2. Resp - CTAB. Abdomen – soft, NTND. Ext- no calf swelling. Neuro – grossly unremarkable. Impression: Sore throat, low risk CP, atypical sounding, pt not able to be perked out, low suspicion for PE. Pt feels better and will discharge.

## 2019-10-24 NOTE — ED PROVIDER NOTE - CARDIAC, MLM
Normal rate, regular rhythm.  Heart sounds S1, S2.  No murmurs, rubs or gallops.  No extremity edema.  No calf tenderness.

## 2019-10-24 NOTE — ED PROVIDER NOTE - OBJECTIVE STATEMENT
34 y.o. female with a PMH of DM, HTN, tremor, anxiety, GERD, vulvodynia, and allergic rhinitis presented to the ER c/o chest pain and throat pain for the past month worse over the past 5 days.  Pt seen by ENT.  CT neck and scope done.  CT negative for acute pathology.  Scope consistent with GERD.  Pt worried.  Admits to late night eating due to her diabetes.  Pt has been treated with po anbx and Nystatin.  Pt denies fever, chills, cough, SOB, nausea, vomiting, diaphoresis, palpitations, recent travel, smoking, drug use, family h/o early heart disease/sudden cardiac death/congenital heart disease. No other complaints.

## 2019-10-24 NOTE — ED ADULT NURSE NOTE - OBJECTIVE STATEMENT
Sore throat x1 month, feels like there is foreign body sensation, however has seen ENT with CT scan and there is no foreign body

## 2019-10-30 ENCOUNTER — OUTPATIENT (OUTPATIENT)
Dept: OUTPATIENT SERVICES | Facility: HOSPITAL | Age: 34
LOS: 1 days | Discharge: HOME | End: 2019-10-30

## 2019-10-30 ENCOUNTER — APPOINTMENT (OUTPATIENT)
Dept: UROGYNECOLOGY | Facility: CLINIC | Age: 34
End: 2019-10-30
Payer: MEDICAID

## 2019-10-30 VITALS
DIASTOLIC BLOOD PRESSURE: 78 MMHG | BODY MASS INDEX: 29.53 KG/M2 | HEIGHT: 64 IN | WEIGHT: 173 LBS | SYSTOLIC BLOOD PRESSURE: 128 MMHG

## 2019-10-30 DIAGNOSIS — R35.0 FREQUENCY OF MICTURITION: ICD-10-CM

## 2019-10-30 PROCEDURE — 99214 OFFICE O/P EST MOD 30 MIN: CPT

## 2019-10-30 RX ORDER — AMITRIPTYLINE HYDROCHLORIDE 100 MG/1
100 TABLET, FILM COATED ORAL
Qty: 30 | Refills: 1 | Status: DISCONTINUED | COMMUNITY
Start: 2019-09-18 | End: 2019-10-30

## 2019-10-30 NOTE — COUNSELING
[FreeTextEntry1] : \par I will contact your insurance company regarding Gabapentin compound cream.\par \par We will contact you if the urine results are abnormal. \par \par If you feel like you have an infection it is important for you to call our office and we will arrange testing of your urine.\par \par Please increase your daily water intake (at least 4 cups a day). \par \par Please take AZO (over the counter) three times a day. It will turn your urine red/orange. \par \par Please STOP taking Elavil 100 mg daily.\par \par Please start taking Elavil 75 mg daily.\par \par Please continue to apply a pea size amount to the opening of the vagina three times a week. \par \par For 4-5 days, cut everything out on the list. \par \par After 4-5 days, you may include one item from the list back into your diet for 4-5 days.\par \par Only put one thing back at a time to see which food is causing you pain. \par \par Follow up in 1 year or earlier if you have any issues

## 2019-10-30 NOTE — HISTORY OF PRESENT ILLNESS
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 3 months)\par s/p 3/19 pudendal nerve block- caused severe pain [address]; through the buttocks), ended up in the ER\par s/p creams- Gabapentin (helped temporarily)\par \par Dr. Engle (Neurologist), states patient had worsening tremor when on gabapentin in the past. She recommended increase in Elavil. Dr. Engle prescribed Elavil 75 mg daily.\par \par Prescribed Elavil 100 mg daily for vulvodynia\par Estrace cream for vulvodynia\par \par Today, patient states she since starting Elavil 100 mg, she has been getting daily frontal headaches, 7/10 intensity, no alleviation with Ibuprofen. Denies blurry vision, visual spots, or loss of consciousness. States she is not seeing an improvement with Elavil 100 mg or Estrace cream. Patient states she has dysuria for the past few days.\par \par Patient would like to return back to her regular dosage of Elavil, 75 mg daily. Also would like to try Gabapentin compound cream again if it's affordable.

## 2019-10-30 NOTE — DISCUSSION/SUMMARY
[FreeTextEntry1] : \par Vulvodynia:\par Discontinued Elavil 100 mg daily due to daily frontal headaches\par Will contact insurance company regarding Gabapentin compound cream\par Recommended diet changes\par Continue to apply a pea size amount of the cream to the opening of the vagina three nights per week. \par Follow up in 1 year for follow up or earlier if she has any issues\par \par Urinary Frequency:\par Urine culture obtained via clean catch.\par Will follow up.\par Will treat accordingly if necessary

## 2019-10-31 DIAGNOSIS — R35.0 FREQUENCY OF MICTURITION: ICD-10-CM

## 2019-10-31 DIAGNOSIS — N94.819 VULVODYNIA, UNSPECIFIED: ICD-10-CM

## 2019-11-01 ENCOUNTER — CHART COPY (OUTPATIENT)
Age: 34
End: 2019-11-01

## 2019-11-01 LAB — BACTERIA UR CULT: NORMAL

## 2019-11-08 ENCOUNTER — APPOINTMENT (OUTPATIENT)
Dept: OTOLARYNGOLOGY | Facility: CLINIC | Age: 34
End: 2019-11-08
Payer: MEDICAID

## 2019-11-08 PROCEDURE — 31575 DIAGNOSTIC LARYNGOSCOPY: CPT

## 2019-11-08 PROCEDURE — 99214 OFFICE O/P EST MOD 30 MIN: CPT | Mod: 25

## 2019-11-08 NOTE — ASSESSMENT
[FreeTextEntry1] : I reviewed CT images myself.\par \par Patient sent to pulmonology for tracheobronchomalacia.\par \par

## 2019-11-08 NOTE — HISTORY OF PRESENT ILLNESS
[FreeTextEntry1] : Patient following up on foreign body sensation in the throat. Patient admits sensation is still there. Feeling is not persistent. It comes and goes. Patient went for CT scan; \par She is also diabetic and has right TMJ pain that has been bothering her intermittently. getting better overall.\par No recent infection.

## 2019-11-14 ENCOUNTER — OTHER (OUTPATIENT)
Age: 34
End: 2019-11-14

## 2019-11-18 ENCOUNTER — RX CHANGE (OUTPATIENT)
Age: 34
End: 2019-11-18

## 2019-12-05 ENCOUNTER — APPOINTMENT (OUTPATIENT)
Dept: OBGYN | Facility: CLINIC | Age: 34
End: 2019-12-05
Payer: MEDICAID

## 2019-12-05 VITALS — DIASTOLIC BLOOD PRESSURE: 80 MMHG | WEIGHT: 174 LBS | SYSTOLIC BLOOD PRESSURE: 118 MMHG | BODY MASS INDEX: 29.87 KG/M2

## 2019-12-05 DIAGNOSIS — N94.819 VULVODYNIA, UNSPECIFIED: ICD-10-CM

## 2019-12-05 PROCEDURE — 99395 PREV VISIT EST AGE 18-39: CPT

## 2019-12-05 PROCEDURE — 99213 OFFICE O/P EST LOW 20 MIN: CPT | Mod: 25

## 2019-12-06 ENCOUNTER — RX RENEWAL (OUTPATIENT)
Age: 34
End: 2019-12-06

## 2019-12-12 ENCOUNTER — RX CHANGE (OUTPATIENT)
Age: 34
End: 2019-12-12

## 2019-12-12 ENCOUNTER — APPOINTMENT (OUTPATIENT)
Dept: OBGYN | Facility: CLINIC | Age: 34
End: 2019-12-12
Payer: MEDICAID

## 2019-12-12 DIAGNOSIS — N64.4 MASTODYNIA: ICD-10-CM

## 2019-12-12 DIAGNOSIS — Z87.42 PERSONAL HISTORY OF OTHER DISEASES OF THE FEMALE GENITAL TRACT: ICD-10-CM

## 2019-12-12 PROCEDURE — 99214 OFFICE O/P EST MOD 30 MIN: CPT

## 2019-12-12 RX ORDER — NYSTATIN 100000 [USP'U]/ML
100000 SUSPENSION ORAL
Qty: 140 | Refills: 0 | Status: COMPLETED | COMMUNITY
Start: 2019-10-05

## 2019-12-12 RX ORDER — ESTRADIOL 0.1 MG/G
0.1 CREAM VAGINAL
Qty: 1 | Refills: 3 | Status: DISCONTINUED | COMMUNITY
Start: 2019-07-26 | End: 2019-12-12

## 2019-12-12 NOTE — PHYSICAL EXAM
[Vulvitis] : vulvitis [Normal] : cervix [Discharge] : a  ~M vaginal discharge was present [Tenderness] : tenderness [Uterine Adnexae] : were not tender and not enlarged [No Bleeding] : there was no active vaginal bleeding

## 2019-12-17 ENCOUNTER — APPOINTMENT (OUTPATIENT)
Dept: OBGYN | Facility: CLINIC | Age: 34
End: 2019-12-17
Payer: MEDICAID

## 2019-12-17 VITALS — WEIGHT: 174 LBS | SYSTOLIC BLOOD PRESSURE: 118 MMHG | DIASTOLIC BLOOD PRESSURE: 80 MMHG | BODY MASS INDEX: 29.87 KG/M2

## 2019-12-17 DIAGNOSIS — Z86.19 PERSONAL HISTORY OF OTHER INFECTIOUS AND PARASITIC DISEASES: ICD-10-CM

## 2019-12-17 DIAGNOSIS — Z87.42 PERSONAL HISTORY OF OTHER DISEASES OF THE FEMALE GENITAL TRACT: ICD-10-CM

## 2019-12-17 DIAGNOSIS — N76.2 ACUTE VULVITIS: ICD-10-CM

## 2019-12-17 PROCEDURE — 81002 URINALYSIS NONAUTO W/O SCOPE: CPT

## 2019-12-17 PROCEDURE — 99214 OFFICE O/P EST MOD 30 MIN: CPT | Mod: 25

## 2019-12-17 NOTE — PHYSICAL EXAM
[Vulvitis] : vulvitis [Discharge] : a  ~M vaginal discharge was present [Normal] : uterus [Tenderness] : tenderness [No Bleeding] : there was no active vaginal bleeding [Uterine Adnexae] : were not tender and not enlarged

## 2019-12-20 ENCOUNTER — OUTPATIENT (OUTPATIENT)
Dept: OUTPATIENT SERVICES | Facility: HOSPITAL | Age: 34
LOS: 1 days | Discharge: HOME | End: 2019-12-20

## 2019-12-20 ENCOUNTER — APPOINTMENT (OUTPATIENT)
Dept: GASTROENTEROLOGY | Facility: CLINIC | Age: 34
End: 2019-12-20
Payer: MEDICAID

## 2019-12-20 VITALS
SYSTOLIC BLOOD PRESSURE: 126 MMHG | BODY MASS INDEX: 29.88 KG/M2 | HEART RATE: 91 BPM | HEIGHT: 64 IN | DIASTOLIC BLOOD PRESSURE: 80 MMHG | WEIGHT: 175 LBS

## 2019-12-20 PROCEDURE — 99214 OFFICE O/P EST MOD 30 MIN: CPT

## 2019-12-20 NOTE — PHYSICAL EXAM
[General Appearance - Alert] : alert [Sclera] : the sclera and conjunctiva were normal [Outer Ear] : the ears and nose were normal in appearance [Neck Appearance] : the appearance of the neck was normal [Apical Impulse] : the apical impulse was normal [Edema] : there was no peripheral edema [Bowel Sounds] : normal bowel sounds [Abdomen Soft] : soft [Abdomen Tenderness] : non-tender [No CVA Tenderness] : no ~M costovertebral angle tenderness [] : no rash [Abnormal Walk] : normal gait [Cranial Nerves] : cranial nerves 2-12 were intact [Impaired Insight] : insight and judgment were intact [General Appearance - Well Developed] : well developed [General Appearance - In No Acute Distress] : in no acute distress

## 2019-12-20 NOTE — REVIEW OF SYSTEMS
[Earache] : earache [As Noted in HPI] : as noted in HPI [Negative] : Neurological [Fever] : no fever [Eye Pain] : no eye pain [Chest Pain] : no chest pain [Shortness Of Breath] : no shortness of breath [Dysuria] : no dysuria [Arthralgias] : no arthralgias [Change In Personality] : no personality change [Muscle Weakness] : no muscle weakness [Easy Bleeding] : no tendency for easy bleeding

## 2019-12-20 NOTE — ASSESSMENT
[FreeTextEntry1] : 33 y/o F with PMHx chronic pelvic pain 2/2 vulvodynia/spasms, type I DM a/w neuropathy has insulin pump, acid reflux, recurrent UTI, tachycardia on metoprolol, R hand tremor, chronic headaches, degenerative disc dx, presenting for follow-up of her GERD symptoms. \par \par \par 1- GERD\par refused to take PPI because of possible worsening of her tremor, as advised by her endocrinologist\par  Avoid NSAIDS\par Antireflux measures \par continue with pepcid 40mg twice daily \par hold off EGD in view of absence of alarm symptoms  \par If symptoms worsen, re-consider EGD\par \par 2- History of constipation\par Currently 1 bowel movement every other day\par advised high fiber diet\par \par RTC as needed. Follow with Dr. John

## 2019-12-20 NOTE — HISTORY OF PRESENT ILLNESS
[___ Month(s) Ago] : [unfilled] month(s) ago [Adding Medication ___] : adding [unfilled] [Heartburn] : stable heartburn [None] : had no significant interval events [Nausea] : denies nausea [Vomiting] : denies vomiting [Diarrhea] : denies diarrhea [Constipation] : denies constipation [Yellow Skin Or Eyes (Jaundice)] : denies jaundice [Abdominal Pain] : denies abdominal pain [Abdominal Swelling] : denies abdominal swelling [Rectal Pain] : denies rectal pain [Fair Compliance] : fair compliance with treatment [GERD] : gastroesophageal reflux disease [Wt Loss ___ Lbs] : no recent weight loss [de-identified] : \par \par \par  \par  [FreeTextEntry1] : 33 y/o F with PMHx chronic pelvic pain 2/2 vulvodynia/spasms, type I DM a/w neuropathy has insulin pump, acid reflux, recurrent UTI, tachycardia on metoprolol, R hand tremor, chronic headaches, degenerative disc dx, presenting for follow-up of her gerd symptoms. \par \par She was scheduled for EGD but has questions about her diet before egd. She c/o heartburn, 1 x per week despite being off pepcid for 1 week. Now controlled when she is on pepcid qd, with nausea, no wt loss. Her endocrinologist recommend against using PPI, thus she never tried it. \par \par no egd or colonoscopy\par FH ? adopted \par usually has 1 bm q1-2 days, no blood, good appetite

## 2019-12-23 DIAGNOSIS — K21.9 GASTRO-ESOPHAGEAL REFLUX DISEASE WITHOUT ESOPHAGITIS: ICD-10-CM

## 2019-12-25 PROBLEM — R10.2 PELVIC PAIN IN FEMALE: Status: RESOLVED | Noted: 2019-06-27 | Resolved: 2019-12-25

## 2019-12-31 ENCOUNTER — LABORATORY RESULT (OUTPATIENT)
Age: 34
End: 2019-12-31

## 2019-12-31 ENCOUNTER — OUTPATIENT (OUTPATIENT)
Dept: OUTPATIENT SERVICES | Facility: HOSPITAL | Age: 34
LOS: 1 days | Discharge: HOME | End: 2019-12-31

## 2019-12-31 DIAGNOSIS — Z00.00 ENCOUNTER FOR GENERAL ADULT MEDICAL EXAMINATION WITHOUT ABNORMAL FINDINGS: ICD-10-CM

## 2020-01-03 ENCOUNTER — OUTPATIENT (OUTPATIENT)
Dept: OUTPATIENT SERVICES | Facility: HOSPITAL | Age: 35
LOS: 1 days | Discharge: HOME | End: 2020-01-03

## 2020-01-03 ENCOUNTER — APPOINTMENT (OUTPATIENT)
Dept: INTERNAL MEDICINE | Facility: CLINIC | Age: 35
End: 2020-01-03
Payer: MEDICAID

## 2020-01-03 VITALS
SYSTOLIC BLOOD PRESSURE: 123 MMHG | DIASTOLIC BLOOD PRESSURE: 83 MMHG | HEIGHT: 64 IN | WEIGHT: 173 LBS | HEART RATE: 90 BPM | BODY MASS INDEX: 29.53 KG/M2

## 2020-01-03 DIAGNOSIS — J39.8 OTHER SPECIFIED DISEASES OF UPPER RESPIRATORY TRACT: ICD-10-CM

## 2020-01-03 DIAGNOSIS — Z86.69 PERSONAL HISTORY OF OTHER DISEASES OF THE NERVOUS SYSTEM AND SENSE ORGANS: ICD-10-CM

## 2020-01-03 DIAGNOSIS — Z86.79 PERSONAL HISTORY OF OTHER DISEASES OF THE CIRCULATORY SYSTEM: ICD-10-CM

## 2020-01-03 DIAGNOSIS — N94.819 VULVODYNIA, UNSPECIFIED: ICD-10-CM

## 2020-01-03 LAB
ESTIMATED AVERAGE GLUCOSE: 183 MG/DL
HBA1C MFR BLD HPLC: 8 %

## 2020-01-03 PROCEDURE — 99213 OFFICE O/P EST LOW 20 MIN: CPT | Mod: GC

## 2020-01-03 RX ORDER — INDOMETHACIN 50 MG/1
CAPSULE ORAL
Refills: 0 | Status: DISCONTINUED | COMMUNITY
End: 2020-01-03

## 2020-01-03 RX ORDER — FAMOTIDINE 40 MG/1
40 TABLET, FILM COATED ORAL
Qty: 30 | Refills: 3 | Status: DISCONTINUED | COMMUNITY
Start: 2019-02-08 | End: 2020-01-03

## 2020-01-03 RX ORDER — FLUCONAZOLE 150 MG/1
150 TABLET ORAL DAILY
Qty: 3 | Refills: 0 | Status: DISCONTINUED | COMMUNITY
Start: 2019-12-17 | End: 2020-01-03

## 2020-01-03 RX ORDER — FLUCONAZOLE 150 MG/1
150 TABLET ORAL
Qty: 1 | Refills: 0 | Status: DISCONTINUED | COMMUNITY
Start: 2019-12-12 | End: 2020-01-03

## 2020-01-03 RX ORDER — FAMOTIDINE 40 MG/1
40 TABLET, FILM COATED ORAL
Qty: 30 | Refills: 3 | Status: DISCONTINUED | COMMUNITY
Start: 2019-12-06 | End: 2020-01-03

## 2020-01-03 NOTE — PHYSICAL EXAM
[No Acute Distress] : no acute distress [Well-Appearing] : well-appearing [Well Developed] : well developed [Well Nourished] : well nourished [Normal Sclera/Conjunctiva] : normal sclera/conjunctiva [Normal Outer Ear/Nose] : the outer ears and nose were normal in appearance [EOMI] : extraocular movements intact [PERRL] : pupils equal round and reactive to light [Normal Oropharynx] : the oropharynx was normal [No JVD] : no jugular venous distention [No Lymphadenopathy] : no lymphadenopathy [Thyroid Normal, No Nodules] : the thyroid was normal and there were no nodules present [Supple] : supple [No Accessory Muscle Use] : no accessory muscle use [No Respiratory Distress] : no respiratory distress  [Normal Rate] : normal rate  [Regular Rhythm] : with a regular rhythm [Clear to Auscultation] : lungs were clear to auscultation bilaterally [No Murmur] : no murmur heard [No Abdominal Bruit] : a ~M bruit was not heard ~T in the abdomen [Normal S1, S2] : normal S1 and S2 [No Carotid Bruits] : no carotid bruits [No Edema] : there was no peripheral edema [Pedal Pulses Present] : the pedal pulses are present [No Extremity Clubbing/Cyanosis] : no extremity clubbing/cyanosis [Soft] : abdomen soft [Non Tender] : non-tender [Non-distended] : non-distended [Normal Bowel Sounds] : normal bowel sounds [Normal Posterior Cervical Nodes] : no posterior cervical lymphadenopathy [Normal Anterior Cervical Nodes] : no anterior cervical lymphadenopathy [No CVA Tenderness] : no CVA  tenderness [No Joint Swelling] : no joint swelling [No Spinal Tenderness] : no spinal tenderness [Grossly Normal Strength/Tone] : grossly normal strength/tone [No Focal Deficits] : no focal deficits [Coordination Grossly Intact] : coordination grossly intact [No Rash] : no rash [Deep Tendon Reflexes (DTR)] : deep tendon reflexes were 2+ and symmetric [Normal Gait] : normal gait [Normal Affect] : the affect was normal [Normal Insight/Judgement] : insight and judgment were intact

## 2020-01-03 NOTE — COUNSELING
[Fall prevention counseling provided] : Fall prevention counseling provided [Behavioral health counseling provided] : Behavioral health counseling provided [Risk of tobacco use and health benefits of smoking cessation discussed] : Risk of tobacco use and health benefits of smoking cessation discussed [Potential consequences of obesity discussed] : Potential consequences of obesity discussed [Benefits of weight loss discussed] : Benefits of weight loss discussed [Encouraged to maintain food diary] : Encouraged to maintain food diary [Encouraged to use exercise tracking device] : Encouraged to use exercise tracking device [Encouraged to increase physical activity] : Encouraged to increase physical activity [Good understanding] : Patient has a good understanding of disease, goals and obesity follow-up plan

## 2020-01-10 ENCOUNTER — RX RENEWAL (OUTPATIENT)
Age: 35
End: 2020-01-10

## 2020-01-10 ENCOUNTER — APPOINTMENT (OUTPATIENT)
Dept: ENDOCRINOLOGY | Facility: CLINIC | Age: 35
End: 2020-01-10

## 2020-01-10 VITALS
WEIGHT: 173 LBS | BODY MASS INDEX: 29.53 KG/M2 | DIASTOLIC BLOOD PRESSURE: 60 MMHG | HEIGHT: 64 IN | SYSTOLIC BLOOD PRESSURE: 110 MMHG

## 2020-01-13 DIAGNOSIS — N94.819 VULVODYNIA, UNSPECIFIED: ICD-10-CM

## 2020-01-13 DIAGNOSIS — R51 HEADACHE: ICD-10-CM

## 2020-01-13 DIAGNOSIS — E28.2 POLYCYSTIC OVARIAN SYNDROME: ICD-10-CM

## 2020-01-13 DIAGNOSIS — K21.9 GASTRO-ESOPHAGEAL REFLUX DISEASE WITHOUT ESOPHAGITIS: ICD-10-CM

## 2020-01-13 DIAGNOSIS — M54.16 RADICULOPATHY, LUMBAR REGION: ICD-10-CM

## 2020-01-13 DIAGNOSIS — J39.8 OTHER SPECIFIED DISEASES OF UPPER RESPIRATORY TRACT: ICD-10-CM

## 2020-01-13 DIAGNOSIS — E10.9 TYPE 1 DIABETES MELLITUS WITHOUT COMPLICATIONS: ICD-10-CM

## 2020-01-16 NOTE — ASSESSMENT
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 Patient reports that her symptoms of polyneuropathy in the lower extremities have been worsening minimally but overall she is stable.  Patient denies fever, chills, cough, rhinorrhea, nasal congestion, ear pain, ear drainage, throat discomfort, chest pain, shortness of breath, dizziness, headache, blurry vision, abdominal pain, diarrhea, constipation, dysuria, increased urinary frequency, increased thirst, lower extremity swelling/tenderness/erythema. \par Patient has an appointment scheduled with a Breast Specialist on 1/31/20, will be making an appointment with a Pulmonologist today, is following with Podiatry and Ophthalmology, is following with Urogynecology, has an appointment with Endocrinology next week. Patient has had blood work done last week.\par \par # DM1: Ha1c 8%, follows up Endocrinology next week\par - per endo, no changes in current DM regimen, on an insulin pump.\par - home FS ranging "all over the place"\par - patient reports she is not compliant with diet and exercise, instructed patient on importance of diet and exercise and potential complications of worsening DM\par \par # Palpitations/Tachycardia: on Metoprolol 50mg bid, f/u with Cardiology. Currently asymptomatic\par # GERD; improved with Famotidine\par # Chronic pelvic pain, Vulvodynia; on Amitriptyline, Flexeril and Gabapentin. Follows with Urogyn \par # TMJ pain; currently denies pain, f/u with ENT and dental\par # Incidental Tracheobronchomalacia on CT scan; primary vs secondary. Can be congenital or can be due to chronic URI (PNA and OM) as a child. Scheduling an appointment with Pulmonologist for further recommendations\par # HCM \par - pap smear 8/2018- negative\par - pt refuses influenza due to severe adverse reaction per pt\par - RTC in 6 months

## 2020-01-16 NOTE — HISTORY OF PRESENT ILLNESS
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 Patient reports that her symptoms of polyneuropathy in the lower extremities have been worsening minimally but overall she is stable.  Patient denies fever, chills, cough, rhinorrhea, nasal congestion, ear pain, ear drainage, throat discomfort, chest pain, shortness of breath, dizziness, headache, blurry vision, abdominal pain, diarrhea, constipation, dysuria, increased urinary frequency, increased thirst, lower extremity swelling/tenderness/erythema. \par Patient has an appointment scheduled with a Breast Specialist on 1/31/20, will be making an appointment with a Pulmonologist today, is following with Podiatry and Ophthalmology, is following with Urogynecology, has an appointment with Endocrinology next week. Patient has had blood work done last week.

## 2020-01-16 NOTE — REVIEW OF SYSTEMS
[Nail Changes] : nail changes [Anxiety] : anxiety [Negative] : Heme/Lymph [Dysuria] : no dysuria [Incontinence] : no incontinence [Nocturia] : no nocturia [Hematuria] : no hematuria [Frequency] : no frequency [Vaginal Discharge] : no vaginal discharge [Itching] : no itching [Skin Rash] : no skin rash [Headache] : no headache [Dizziness] : no dizziness [Confusion] : no confusion [Fainting] : no fainting [Memory Loss] : no memory loss [Unsteady Walking] : no ataxia [FreeTextEntry8] : occasional vulvodynia [de-identified] : chronic polyneuropathy

## 2020-01-23 ENCOUNTER — APPOINTMENT (OUTPATIENT)
Dept: BREAST CENTER | Facility: CLINIC | Age: 35
End: 2020-01-23
Payer: MEDICAID

## 2020-01-23 VITALS
BODY MASS INDEX: 29.53 KG/M2 | DIASTOLIC BLOOD PRESSURE: 70 MMHG | HEIGHT: 64 IN | WEIGHT: 173 LBS | SYSTOLIC BLOOD PRESSURE: 112 MMHG | TEMPERATURE: 98.4 F

## 2020-01-23 DIAGNOSIS — R92.2 INCONCLUSIVE MAMMOGRAM: ICD-10-CM

## 2020-01-23 DIAGNOSIS — N64.4 MASTODYNIA: ICD-10-CM

## 2020-01-23 DIAGNOSIS — E28.2 POLYCYSTIC OVARIAN SYNDROME: ICD-10-CM

## 2020-01-23 PROCEDURE — 99203 OFFICE O/P NEW LOW 30 MIN: CPT

## 2020-01-23 NOTE — REVIEW OF SYSTEMS
[Breast Pain] : breast pain [Negative] : Heme/Lymph [Skin Lesions] : no skin lesions [Abn Vaginal Bleeding] : unexplained vaginal bleeding [As Noted in HPI] : as noted in HPI [Breast Lump] : no breast lump [Skin Wound] : no skin wound

## 2020-01-23 NOTE — ASSESSMENT
[FreeTextEntry1] : Ania is a 34 F with bilateral breast pain. \par \par On exam, there was no suspicious abnormalities palpated within either breast.  HOwever, because of her symptoms, I will schedule her for a b/l dx mammogram and US to ensure there are no underlying lesions contributing to her pain.  If the imaging is unrevealing, however, then I will have her follow up in 3 months for re-evaluation of her breast pain. \par \par In regards to her breast pain, it may be related to fibrocystic changes within her breast that are hormonally influenced. We spoke about possible interventions including evening primrose oil, supportive bras, and decreasing caffeine intake.  Although none of these have been consistently proven to improve breast pain, they may be tried.  If the pain becomes very severe, there have been studies of tamoxifen being effective for the treatment of breast pain, although there are risks with tamoxifen.  At this time she will try supportive measures.\par \par SHe is otherwise at an average risk for breast cancer, although her family history was not able to be obtained as she is adopted. \par \par All of her questions were answered. She knows to call with any further questions or concerns. \par Of note, her mother was present for the entirety of this consultation. \par \par PLAN: \par -b/l dx mammogram and US \par -f/up in three months for re-evaluation of breast pain if above imaging is unrevealing

## 2020-01-23 NOTE — HISTORY OF PRESENT ILLNESS
[FreeTextEntry1] : Ania is a 34 F with b/l breast pain. \par \par She has had b/l breast pain described as a burning sensation especially in the retroareolar location for the past several years.  THe pain had stopped for many months, but has recurred since 2019.  She has tried ibuprofen with little relief of her symptoms. \par \par SHe otherwise has not palpated any abnormal masses in either breast and denies any nipple discharge or retraction. \par \par Of note, she has irregular menses 2/2  PCOS, and has had her hormones checked in the past which revealed high testosterone per patient.  She also has type 1 DM and is being maintained on humalog. \par \par HISTORICAL RISK FACTORS: \par -no prior breast biopsies or surgeries \par -unknown family history as patient was adopted\par -\par -no prior OCP use \par -no gyn surgeries\par

## 2020-01-23 NOTE — PHYSICAL EXAM
[Normocephalic] : normocephalic [No Supraclavicular Adenopathy] : no supraclavicular adenopathy [No Cervical Adenopathy] : no cervical adenopathy [EOMI] : extra ocular movement intact [Atraumatic] : atraumatic [Examined in the supine and seated position] : examined in the supine and seated position [No dominant masses] : no dominant masses in right breast  [Bra Size: ___] : Bra Size: [unfilled] [Asymmetrical] : asymmetrical [No dominant masses] : no dominant masses left breast [No Nipple Retraction] : no left nipple retraction [No Nipple Discharge] : no left nipple discharge [No Axillary Lymphadenopathy] : no left axillary lymphadenopathy [Soft] : abdomen soft [Not Tender] : non-tender [No Edema] : no edema [No Ulceration] : no ulceration [No Rashes] : no rashes [de-identified] : L breast larger than right, dense breast tissue b/l, but no suspicious masses palpated within either breast

## 2020-01-23 NOTE — PAST MEDICAL HISTORY
[Menstruating] : The patient is menstruating [Menarche Age ____] : age at menarche was [unfilled] [Normal Amount/Duration] : it was of a normal amount and duration [Definite ___ (Date)] : the last menstrual period was [unfilled] [History of Hormone Replacement Treatment] : has no history of hormone replacement treatment [Irregular Cycle Intervals] : are  irregular [Total Preg ___] : G[unfilled] [FreeTextEntry5] : denies  [FreeTextEntry6] : denies [FreeTextEntry7] : denies [FreeTextEntry8] : n/a

## 2020-02-05 ENCOUNTER — APPOINTMENT (OUTPATIENT)
Dept: NEUROLOGY | Facility: CLINIC | Age: 35
End: 2020-02-05
Payer: MEDICAID

## 2020-02-05 ENCOUNTER — FORM ENCOUNTER (OUTPATIENT)
Age: 35
End: 2020-02-05

## 2020-02-05 PROCEDURE — 99213 OFFICE O/P EST LOW 20 MIN: CPT

## 2020-02-05 NOTE — ASSESSMENT
[FreeTextEntry1] : 33 yo female with hx of tremor, DM, vulvodynia, clitoral neuropathy, headaches\par \par will increase cyclobenzaprine 10 mg qd as this has helped her tremors in the past

## 2020-02-05 NOTE — HISTORY OF PRESENT ILLNESS
[FreeTextEntry1] : patient returns for follow up today\par patient with baseline UE tremor but now she feels the trmor is all over\par she is taking cyclobenzaprine 5 mg as needed\par patient had worsening ha on elavil 100 mg so she went back to 75 mg and ha improved\par she has worsening breast pain ? fibromyalgia vs fibrocystic changes in the breast\par patient's bad HAs are better with indomethacin 25 mg as needed\par \par

## 2020-02-06 ENCOUNTER — OUTPATIENT (OUTPATIENT)
Dept: OUTPATIENT SERVICES | Facility: HOSPITAL | Age: 35
LOS: 1 days | Discharge: HOME | End: 2020-02-06
Payer: MEDICAID

## 2020-02-06 DIAGNOSIS — N64.59 OTHER SIGNS AND SYMPTOMS IN BREAST: ICD-10-CM

## 2020-02-06 DIAGNOSIS — N64.4 MASTODYNIA: ICD-10-CM

## 2020-02-06 PROCEDURE — 77066 DX MAMMO INCL CAD BI: CPT | Mod: 26

## 2020-02-06 PROCEDURE — G0279: CPT | Mod: 26

## 2020-02-06 PROCEDURE — 76641 ULTRASOUND BREAST COMPLETE: CPT | Mod: 26,50

## 2020-02-06 PROCEDURE — 77062 BREAST TOMOSYNTHESIS BI: CPT | Mod: 26

## 2020-02-14 ENCOUNTER — OUTPATIENT (OUTPATIENT)
Dept: OUTPATIENT SERVICES | Facility: HOSPITAL | Age: 35
LOS: 1 days | Discharge: HOME | End: 2020-02-14

## 2020-02-14 ENCOUNTER — APPOINTMENT (OUTPATIENT)
Dept: ENDOCRINOLOGY | Facility: CLINIC | Age: 35
End: 2020-02-14
Payer: MEDICAID

## 2020-02-14 VITALS
WEIGHT: 173 LBS | SYSTOLIC BLOOD PRESSURE: 114 MMHG | HEIGHT: 64 IN | BODY MASS INDEX: 29.53 KG/M2 | DIASTOLIC BLOOD PRESSURE: 70 MMHG

## 2020-02-14 PROCEDURE — 99214 OFFICE O/P EST MOD 30 MIN: CPT

## 2020-02-14 NOTE — PHYSICAL EXAM
[Alert] : alert [No Acute Distress] : no acute distress [EOMI] : extra ocular movement intact [PERRL] : pupils equal, round and reactive to light [Clear to Auscultation] : lungs were clear to auscultation bilaterally [No Respiratory Distress] : no respiratory distress [Normal Rate and Effort] : normal respiratory rhythm and effort [Normal Rate] : heart rate was normal  [Normal S1, S2] : normal S1 and S2 [Regular Rhythm] : with a regular rhythm [Oriented x3] : oriented to person, place, and time [de-identified] : N [de-identified] : No gross sensory deficits on B/L LE. 5/5 motor strength in LE bilaterally

## 2020-02-14 NOTE — ASSESSMENT
[Carbohydrate Consistent Diet] : carbohydrate consistent diet [Diabetes Foot Care] : diabetes foot care [Importance of Diet and Exercise] : importance of diet and exercise to improve glycemic control, achieve weight loss and improve cardiovascular health [FreeTextEntry1] : Patient is a 35 y/o female presenting for a 3 month follow up for her type 1 DM. She is on a Humalog pump. Patient states she has b/l lower extremity burning that is worse with walking, preventing her from walking more than 2 blocks\par \par #T1DM\par -Complaining of burning in B/L LE extremities when walking\par -Hgb A1c 6/2019: 8.0%\par -c/w FS diary\par -c/w insulin pump\par -repeat Hgb A1c this visit, lipids, microalbumin, CMP\par -RTC in 3 months\par -Refilled humalog solution and ketostix\par

## 2020-02-14 NOTE — HISTORY OF PRESENT ILLNESS
[FreeTextEntry1] : Patient is a 33 y/o female presenting for a 3 month follow up for her type 1 DM. She is on a Humalog pump. Patient states she has b/l lower extremity burning that is worse with walking, preventing her from walking more than 2 blocks. Patient is with her mother, and they complain of severe life stress with looking for an apartment. Cannot cook their own food as they do not have a stove. Patient denies urinary frequency, excessive thirst, diaphoresis, lightheadedness, chest pain or SOB. Patient brought carbohydrate and fingerstick diary.

## 2020-02-17 ENCOUNTER — TRANSCRIPTION ENCOUNTER (OUTPATIENT)
Age: 35
End: 2020-02-17

## 2020-02-19 ENCOUNTER — EMERGENCY (EMERGENCY)
Facility: HOSPITAL | Age: 35
LOS: 0 days | Discharge: HOME | End: 2020-02-19
Attending: EMERGENCY MEDICINE | Admitting: EMERGENCY MEDICINE
Payer: MEDICAID

## 2020-02-19 VITALS
RESPIRATION RATE: 20 BRPM | TEMPERATURE: 101 F | OXYGEN SATURATION: 96 % | SYSTOLIC BLOOD PRESSURE: 132 MMHG | WEIGHT: 173.06 LBS | DIASTOLIC BLOOD PRESSURE: 78 MMHG | HEART RATE: 102 BPM

## 2020-02-19 VITALS
HEART RATE: 100 BPM | DIASTOLIC BLOOD PRESSURE: 76 MMHG | RESPIRATION RATE: 20 BRPM | OXYGEN SATURATION: 95 % | SYSTOLIC BLOOD PRESSURE: 149 MMHG

## 2020-02-19 DIAGNOSIS — Z88.8 ALLERGY STATUS TO OTHER DRUGS, MEDICAMENTS AND BIOLOGICAL SUBSTANCES STATUS: ICD-10-CM

## 2020-02-19 DIAGNOSIS — Z88.1 ALLERGY STATUS TO OTHER ANTIBIOTIC AGENTS STATUS: ICD-10-CM

## 2020-02-19 DIAGNOSIS — Z88.0 ALLERGY STATUS TO PENICILLIN: ICD-10-CM

## 2020-02-19 DIAGNOSIS — J06.9 ACUTE UPPER RESPIRATORY INFECTION, UNSPECIFIED: ICD-10-CM

## 2020-02-19 DIAGNOSIS — R05 COUGH: ICD-10-CM

## 2020-02-19 DIAGNOSIS — E10.9 TYPE 1 DIABETES MELLITUS WITHOUT COMPLICATIONS: ICD-10-CM

## 2020-02-19 PROCEDURE — 99284 EMERGENCY DEPT VISIT MOD MDM: CPT

## 2020-02-19 PROCEDURE — 71046 X-RAY EXAM CHEST 2 VIEWS: CPT | Mod: 26

## 2020-02-19 RX ORDER — CYCLOBENZAPRINE HYDROCHLORIDE 10 MG/1
1 TABLET, FILM COATED ORAL
Qty: 0 | Refills: 0 | DISCHARGE

## 2020-02-19 RX ORDER — ALBUTEROL 90 UG/1
2 AEROSOL, METERED ORAL ONCE
Refills: 0 | Status: COMPLETED | OUTPATIENT
Start: 2020-02-19 | End: 2020-02-19

## 2020-02-19 RX ORDER — IBUPROFEN 200 MG
600 TABLET ORAL ONCE
Refills: 0 | Status: COMPLETED | OUTPATIENT
Start: 2020-02-19 | End: 2020-02-19

## 2020-02-19 RX ORDER — INDOMETHACIN 50 MG
0 CAPSULE ORAL
Qty: 0 | Refills: 0 | DISCHARGE

## 2020-02-19 RX ORDER — SOD,AMMONIUM,POTASSIUM LACTATE
0 CREAM (GRAM) TOPICAL
Qty: 0 | Refills: 0 | DISCHARGE

## 2020-02-19 RX ORDER — IPRATROPIUM/ALBUTEROL SULFATE 18-103MCG
3 AEROSOL WITH ADAPTER (GRAM) INHALATION ONCE
Refills: 0 | Status: COMPLETED | OUTPATIENT
Start: 2020-02-19 | End: 2020-02-19

## 2020-02-19 RX ORDER — METOPROLOL TARTRATE 50 MG
0.5 TABLET ORAL
Qty: 0 | Refills: 0 | DISCHARGE

## 2020-02-19 RX ADMIN — Medication 3 MILLILITER(S): at 12:38

## 2020-02-19 RX ADMIN — ALBUTEROL 2 PUFF(S): 90 AEROSOL, METERED ORAL at 13:08

## 2020-02-19 RX ADMIN — Medication 600 MILLIGRAM(S): at 11:42

## 2020-02-19 NOTE — ED PROVIDER NOTE - CLINICAL SUMMARY MEDICAL DECISION MAKING FREE TEXT BOX
34yF T1DM p/w fever, cough, URI, ear/throat pain x 4-5d.  Pt febrile on arrival w/ borderline tachycardia (improving w/ PO antipyretics) but no resp distress (no tachypnea, inc WOB or dyspnea despite expiratory wheezing). CXR w/o ptx or pna.  No clinical concern for AOM, pharyngitis, meningitis or other serious bacterial infection.  Recommend supportive care, alb inh/neb, f/u PCP, return precautions.

## 2020-02-19 NOTE — ED PROVIDER NOTE - NSFOLLOWUPINSTRUCTIONS_ED_ALL_ED_FT
Respiratory Infection    A respiratory infection is an illness that affects parts of the body used for breathing, like the lungs, nose, and throat. It is usually caused by a germ called a virus. Symptoms can include runny nose, coughing, sneezing, fatigue, body aches, sore throat, fever, or headache. Over the counter medicine can be used to manage the symptoms but the infection typically goes away on its own in 5 to 10 days.     SEEK IMMEDIATE MEDICAL CARE IF YOU HAVE ANY OF THE FOLLOWING SYMPTOMS: shortness of breath, chest pain, fever over 10 days, or lightheadedness/dizziness.     Cough    Coughing is a reflex that clears your throat and your airways. Coughing helps to heal and protect your lungs. It is normal to cough occasionally, but a cough that happens with other symptoms or lasts a long time may be a sign of a condition that needs treatment. Coughing may be caused by infections, asthma or COPD, smoking, postnasal drip, gastroesophageal reflux, as well as other medical conditions. Take medicines only as instructed by your health care provider. Avoid environments or triggers that causes you to cough at work or at home.    SEEK IMMEDIATE MEDICAL CARE IF YOU HAVE ANY OF THE FOLLOWING SYMPTOMS: coughing up blood, shortness of breath, rapid heart rate, chest pain, unexplained weight loss or night sweats.

## 2020-02-19 NOTE — ED PROVIDER NOTE - NS ED ROS FT
Review of Systems:  	•	CONSTITUTIONAL - no fever, no diaphoresis, no chills  	•	SKIN - no rash  	•	HEMATOLOGIC - no bleeding, no bruising  	•	EYES - no eye pain, no blurry vision  	•	ENT - no change in hearing, no sore throat, no ear pain or tinnitus  	•	RESPIRATORY - no shortness of breath, positive cough  	•	CARDIAC - no chest pain, no palpitations  	  	•	MUSCULOSKELETAL - no joint paint, no swelling, no redness  	•	NEUROLOGIC - no weakness, no headache, no paresthesias, no LOC  	•	PSYCH - no anxiety, non suicidal, non homicidal, no hallucination, no depression

## 2020-02-19 NOTE — ED PROVIDER NOTE - ATTENDING CONTRIBUTION TO CARE
34yF T1DM on insulin GERD childhood asthma (no inh use x 15yr) p/w URI, fever, cough, congestion, b/l ear/throat burning x 4-5d.  Has been taking antipyretics w/ only temporary relief.  Had neg flu/strep rapid swabs at urgent care.  Unknown if she got flu shot this year or any sick contacts.    CONSTITUTIONAL: well developed; well nourished; well appearing in no acute distress  HEAD: normocephalic; atraumatic  EYES: no conjunctival injection, no scleral icterus  ENT: no nasal discharge; b/l TM w/ good light reflex, no purulent drainage, +mild throat erythema w/o edema or exudate, no sig submandibular lymphadenopathy  NECK: supple; non tender. + full passive ROM in all directions  CARD: S1, S2 normal; no murmurs, gallops, or rubs. Regular rate and rhythm  RESP: +b/l expiratory wheezing, rales or rhonchi. Good air movement bilaterally without significant accessory muscle use  ABD: soft; non-distended; non-tender. No rebound, no guarding, no pulsatile abdominal mass  EXT: moving all extremities spontaneously, normal ROM. No clubbing, cyanosis or edema  SKIN: warm and dry, no lesions noted  NEURO: alert, oriented, CN II-XII grossly intact, motor and sensory grossly intact, speech nonslurred, no focal deficits. GCS 15  PSYCH: calm, cooperative, appropriate, good eye contact, logical thought process, no apparent danger to self or others

## 2020-02-19 NOTE — ED PROVIDER NOTE - PHYSICAL EXAMINATION
--EXAM--  VITAL SIGNS: I have reviewed vs documented at present.  CONSTITUTIONAL: Well-developed; well-nourished; in no acute distress.   SKIN: Warm and dry, no acute rash.   HEAD: Normocephalic; atraumatic.  EYES: PERRL, EOM intact; conjunctiva and sclera clear. No nystagmus.  ENT: No nasal discharge; airway clear.  NECK: Supple; non tender.  CARD: S1, S2, Regular rate and rhythm.   RESP: No wheezes, rales or rhonchi.    EXT: Normal ROM.   NEURO: Alert, oriented, grossly unremarkable. Strength 5/5 in all extremities. Sensation intact throughout.  PSYCH: Cooperative, appropriate.

## 2020-02-19 NOTE — ED PROVIDER NOTE - OBJECTIVE STATEMENT
this is 35 yo female who presents to ed for evaluation of cough since friday. patient states that she went to urgent care and test negative for flu and strep. patient was prescribed tesselon and nasal spray. patient is still having cough and wanted to make sure she was ok and did not need antibiotics

## 2020-02-19 NOTE — ED PROVIDER NOTE - PATIENT PORTAL LINK FT
You can access the FollowMyHealth Patient Portal offered by [facility] by registering at the following website: [email or web address]. By joining [facility]’s FollowMyHealth portal, you will also be able to view your health information using other applications (apps) compatible with our system.

## 2020-02-19 NOTE — ED ADULT NURSE NOTE - CHIEF COMPLAINT QUOTE
"Since Friday I have had fever, cough and laryngitis." As per mom pt went to [facility] on Friday and was diagnosed with Pharyngitis and Acute URI. "She was up all night coughing." Pt prescribed Benzonatate 100 mg 3 X a day as needed, as well as Ipratropium Bromide  nasal spray. Pt took 2 Ibuprofen at 10:15 a.m.

## 2020-02-19 NOTE — ED ADULT NURSE NOTE - PMH
Degenerative disc disease, thoracic    Gastroesophageal reflux disease, esophagitis presence not specified    Intractable headache, unspecified chronicity pattern, unspecified headache type    Major depressive disorder with single episode, in full remission  previously treated with zyprexa, celexa and lexapro  Neuropathy  right lower extremity, vaginal  Spinal stenosis    Tachycardia    Tremor of right hand    Type 1 diabetes    Urinary tract infection, recurrent

## 2020-02-19 NOTE — ED ADULT TRIAGE NOTE - CHIEF COMPLAINT QUOTE
"Since Friday I have had fever, cough and laryngitis." As per mom pt went to [facility] on Friday and was diagnosed with Pharyngitis and Acute URI. "She was up all night coughing." Pt prescribed Benzonatate 100 mg 3 X a day as needed, as well as Ipratropium Bromide  nasal spray. Pt took 2 Ibuprofen at 10:15 a.m.

## 2020-02-19 NOTE — ED PROVIDER NOTE - CARE PLAN
Principal Discharge DX:	Fever  Secondary Diagnosis:	URI (upper respiratory infection)  Secondary Diagnosis:	Cough  Secondary Diagnosis:	Type 1 diabetes

## 2020-03-13 ENCOUNTER — APPOINTMENT (OUTPATIENT)
Dept: PODIATRY | Facility: CLINIC | Age: 35
End: 2020-03-13

## 2020-03-30 ENCOUNTER — TRANSCRIPTION ENCOUNTER (OUTPATIENT)
Age: 35
End: 2020-03-30

## 2020-06-04 NOTE — ED ADULT NURSE NOTE - NS ED NURSE DC INFO COMPLEXITY
0 = swallows foods/liquids without difficulty Verbalized Understanding/Simple: Patient demonstrates quick and easy understanding

## 2020-06-05 ENCOUNTER — APPOINTMENT (OUTPATIENT)
Dept: NEUROLOGY | Facility: CLINIC | Age: 35
End: 2020-06-05

## 2020-06-08 ENCOUNTER — APPOINTMENT (OUTPATIENT)
Dept: OTOLARYNGOLOGY | Facility: CLINIC | Age: 35
End: 2020-06-08

## 2020-06-23 PROBLEM — M48.00 SPINAL STENOSIS, SITE UNSPECIFIED: Chronic | Status: ACTIVE | Noted: 2020-02-19

## 2020-06-25 ENCOUNTER — APPOINTMENT (OUTPATIENT)
Dept: INTERNAL MEDICINE | Facility: CLINIC | Age: 35
End: 2020-06-25

## 2020-07-17 ENCOUNTER — APPOINTMENT (OUTPATIENT)
Dept: ENDOCRINOLOGY | Facility: CLINIC | Age: 35
End: 2020-07-17

## 2020-07-24 NOTE — ED ADULT NURSE NOTE - NS ED NURSE LEVEL OF CONSCIOUSNESS ORIENTATION
2nd message left, attempted to reach her before the weekend.   Oriented - self; Oriented - place; Oriented - time

## 2020-08-19 ENCOUNTER — APPOINTMENT (OUTPATIENT)
Dept: ENDOCRINOLOGY | Facility: CLINIC | Age: 35
End: 2020-08-19

## 2020-08-19 ENCOUNTER — OUTPATIENT (OUTPATIENT)
Dept: OUTPATIENT SERVICES | Facility: HOSPITAL | Age: 35
LOS: 1 days | Discharge: HOME | End: 2020-08-19

## 2020-08-19 VITALS
HEIGHT: 64 IN | BODY MASS INDEX: 29.88 KG/M2 | SYSTOLIC BLOOD PRESSURE: 110 MMHG | HEART RATE: 68 BPM | WEIGHT: 175 LBS | DIASTOLIC BLOOD PRESSURE: 70 MMHG

## 2020-08-19 NOTE — ASSESSMENT
[FreeTextEntry1] : patient is refusing any changes, and refusing glucose sensors. refusing diabetes education for now. [Long Term Vascular Complications] : long term vascular complications of diabetes [Diabetes Foot Care] : diabetes foot care [Hypoglycemia Management] : hypoglycemia management [Carbohydrate Consistent Diet] : carbohydrate consistent diet [Importance of Diet and Exercise] : importance of diet and exercise to improve glycemic control, achieve weight loss and improve cardiovascular health

## 2020-08-19 NOTE — PHYSICAL EXAM
[Alert] : alert [Well Developed] : well developed [Normal Sclera/Conjunctiva] : normal sclera/conjunctiva [No Acute Distress] : no acute distress [Well Nourished] : well nourished [EOMI] : extra ocular movement intact [No Proptosis] : no proptosis [Thyroid Not Enlarged] : the thyroid was not enlarged [Normal Oropharynx] : the oropharynx was normal [No Thyroid Nodules] : no palpable thyroid nodules [No Respiratory Distress] : no respiratory distress [No Accessory Muscle Use] : no accessory muscle use [Normal Rate] : heart rate was normal [Normal S1, S2] : normal S1 and S2 [Clear to Auscultation] : lungs were clear to auscultation bilaterally [Regular Rhythm] : with a regular rhythm [No Edema] : no peripheral edema [Pedal Pulses Normal] : the pedal pulses are present [Normal Bowel Sounds] : normal bowel sounds [Not Tender] : non-tender [Not Distended] : not distended [Normal Posterior Cervical Nodes] : no posterior cervical lymphadenopathy [Soft] : abdomen soft [Normal Anterior Cervical Nodes] : no anterior cervical lymphadenopathy [No Stigmata of Cushings Syndrome] : no stigmata of Cushings Syndrome [No Spinal Tenderness] : no spinal tenderness [Spine Straight] : spine straight [Normal Gait] : normal gait [Normal Strength/Tone] : muscle strength and tone were normal [No Rash] : no rash [No Tremors] : no tremors [Acanthosis Nigricans] : no acanthosis nigricans [Normal Reflexes] : deep tendon reflexes were 2+ and symmetric [Oriented x3] : oriented to person, place, and time

## 2020-08-21 ENCOUNTER — APPOINTMENT (OUTPATIENT)
Dept: PULMONOLOGY | Facility: CLINIC | Age: 35
End: 2020-08-21

## 2020-09-14 ENCOUNTER — APPOINTMENT (OUTPATIENT)
Dept: BREAST CENTER | Facility: CLINIC | Age: 35
End: 2020-09-14

## 2020-09-26 NOTE — ED ADULT NURSE NOTE - NS ED NURSE RECORD ANOTHER VITAL SIGN
Take Bentyl as needed for abdominal cramping. Take OTC Miralax as needed for soft bowel movements. Eat a high fiber diet.   Drink plenty of water to prevent dehydration.  Call your doctor to schedule a follow up appointment in 3 days if you still feel ill.  Return to ER if you develop any signs of dehydration (decreased urine output, weakness, lethargy), increased abdominal pain, or worsening of symptoms in any way.      Yes

## 2020-10-16 ENCOUNTER — TRANSCRIPTION ENCOUNTER (OUTPATIENT)
Age: 35
End: 2020-10-16

## 2020-10-28 ENCOUNTER — APPOINTMENT (OUTPATIENT)
Dept: UROGYNECOLOGY | Facility: CLINIC | Age: 35
End: 2020-10-28

## 2020-11-25 ENCOUNTER — APPOINTMENT (OUTPATIENT)
Dept: NEUROLOGY | Facility: CLINIC | Age: 35
End: 2020-11-25
Payer: MEDICAID

## 2020-11-25 VITALS
BODY MASS INDEX: 29.88 KG/M2 | HEART RATE: 99 BPM | WEIGHT: 175 LBS | DIASTOLIC BLOOD PRESSURE: 78 MMHG | TEMPERATURE: 97.8 F | SYSTOLIC BLOOD PRESSURE: 122 MMHG | HEIGHT: 64 IN | OXYGEN SATURATION: 99 %

## 2020-11-25 PROCEDURE — 99072 ADDL SUPL MATRL&STAF TM PHE: CPT

## 2020-11-25 PROCEDURE — 99213 OFFICE O/P EST LOW 20 MIN: CPT

## 2020-11-25 NOTE — ASSESSMENT
[FreeTextEntry1] : 36 yo female with hx of tremor, DM POLYNEUROPATHY, vulvodynia, clitoral neuropathy, headaches\par \par cont elavil 75 mg qd and indomethacin 25 mg prn for ha\par cont flexeril 10 mg prn for tremor\par f/u in 6 mos\par \par \par \par  \par

## 2020-11-25 NOTE — HISTORY OF PRESENT ILLNESS
[FreeTextEntry1] : 34 yo female who presents for follow up. She has dx of dm polyneuropathy, migraines, ET\par she is maintained on flexeril, elavil and indomethacin prn\par \par she is stable with no new complaints

## 2020-11-25 NOTE — PHYSICAL EXAM
[FreeTextEntry1] : Neurological Exam: \par Mental status: Awake, alert and oriented x3.  Recent and remote memory intact.  Naming, repetition and comprehension intact.  Attention/concentration intact.  No dysarthria, no aphasia.  Fund of knowledge appropriate.  \par Cranial nerves: Pupils equally round and reactive to light, visual fields full, no nystagmus, extraocular muscles intact, V1 through V3 intact bilaterally and symmetric, face symmetric, hearing intact to finger rub, palate elevation symmetric, tongue was midline.\par Motor:  MRC grading 5/5 b/l UE/LE.   strength 5/5.  Normal tone and bulk.  No abnormal movements.  \par Sensation: Intact to light touch, proprioception, and pinprick. \par Coordination: No dysmetria on finger-to-nose and heel-to-shin.  No dysdiadokinesia.\par Reflexes: hyporeflexic x 4\par Gait: Narrow and steady. No ataxia.  Romberg negative\par \par

## 2020-12-21 PROBLEM — Z86.19 HISTORY OF CANDIDAL VULVOVAGINITIS: Status: RESOLVED | Noted: 2019-12-17 | Resolved: 2020-12-21

## 2021-01-07 ENCOUNTER — RX RENEWAL (OUTPATIENT)
Age: 36
End: 2021-01-07

## 2021-01-23 LAB
25(OH)D3 SERPL-MCNC: 22 NG/ML
ALBUMIN SERPL ELPH-MCNC: 4.3 G/DL
ALP BLD-CCNC: 99 U/L
ALT SERPL-CCNC: 18 U/L
ANION GAP SERPL CALC-SCNC: 13 MMOL/L
AST SERPL-CCNC: 15 U/L
BASOPHILS # BLD AUTO: 0.06 K/UL
BASOPHILS NFR BLD AUTO: 0.6 %
BILIRUB SERPL-MCNC: 0.3 MG/DL
BUN SERPL-MCNC: 8 MG/DL
CALCIUM SERPL-MCNC: 9.7 MG/DL
CHLORIDE SERPL-SCNC: 98 MMOL/L
CHOLEST SERPL-MCNC: 175 MG/DL
CO2 SERPL-SCNC: 25 MMOL/L
CREAT SERPL-MCNC: 0.7 MG/DL
EOSINOPHIL # BLD AUTO: 0.12 K/UL
EOSINOPHIL NFR BLD AUTO: 1.2 %
ESTIMATED AVERAGE GLUCOSE: 203 MG/DL
GLUCOSE SERPL-MCNC: 257 MG/DL
HBA1C MFR BLD HPLC: 8.7 %
HCT VFR BLD CALC: 42.3 %
HDLC SERPL-MCNC: 48 MG/DL
HGB BLD-MCNC: 13.6 G/DL
IMM GRANULOCYTES NFR BLD AUTO: 0.5 %
LDLC SERPL CALC-MCNC: 116 MG/DL
LYMPHOCYTES # BLD AUTO: 2.92 K/UL
LYMPHOCYTES NFR BLD AUTO: 29.1 %
MAN DIFF?: NORMAL
MCHC RBC-ENTMCNC: 29.5 PG
MCHC RBC-ENTMCNC: 32.2 G/DL
MCV RBC AUTO: 91.8 FL
MONOCYTES # BLD AUTO: 0.72 K/UL
MONOCYTES NFR BLD AUTO: 7.2 %
NEUTROPHILS # BLD AUTO: 6.17 K/UL
NEUTROPHILS NFR BLD AUTO: 61.4 %
NONHDLC SERPL-MCNC: 127 MG/DL
PLATELET # BLD AUTO: 358 K/UL
POTASSIUM SERPL-SCNC: 4.7 MMOL/L
PROT SERPL-MCNC: 7.6 G/DL
RBC # BLD: 4.61 M/UL
RBC # FLD: 12.5 %
SODIUM SERPL-SCNC: 136 MMOL/L
TRIGL SERPL-MCNC: 66 MG/DL
TSH SERPL-ACNC: 1.71 UIU/ML
WBC # FLD AUTO: 10.04 K/UL

## 2021-01-25 ENCOUNTER — RX RENEWAL (OUTPATIENT)
Age: 36
End: 2021-01-25

## 2021-01-26 ENCOUNTER — OUTPATIENT (OUTPATIENT)
Dept: OUTPATIENT SERVICES | Facility: HOSPITAL | Age: 36
LOS: 1 days | Discharge: HOME | End: 2021-01-26

## 2021-01-26 ENCOUNTER — APPOINTMENT (OUTPATIENT)
Dept: INTERNAL MEDICINE | Facility: CLINIC | Age: 36
End: 2021-01-26
Payer: MEDICAID

## 2021-01-26 VITALS
HEART RATE: 88 BPM | DIASTOLIC BLOOD PRESSURE: 82 MMHG | OXYGEN SATURATION: 99 % | BODY MASS INDEX: 30.9 KG/M2 | WEIGHT: 181 LBS | TEMPERATURE: 97.9 F | HEIGHT: 64 IN | SYSTOLIC BLOOD PRESSURE: 134 MMHG

## 2021-01-26 PROCEDURE — 99213 OFFICE O/P EST LOW 20 MIN: CPT | Mod: GC

## 2021-01-26 RX ORDER — NYSTATIN 100000 U/G
100000 OINTMENT TOPICAL 3 TIMES DAILY
Qty: 1 | Refills: 0 | Status: DISCONTINUED | COMMUNITY
Start: 2019-12-17 | End: 2021-01-26

## 2021-01-26 RX ORDER — FLUTICASONE PROPIONATE 50 UG/1
50 SPRAY, METERED NASAL DAILY
Qty: 1 | Refills: 0 | Status: DISCONTINUED | COMMUNITY
Start: 2019-11-14 | End: 2021-01-26

## 2021-01-26 RX ORDER — LORATADINE 10 MG/1
10 TABLET ORAL
Qty: 30 | Refills: 0 | Status: DISCONTINUED | COMMUNITY
Start: 2019-10-04 | End: 2021-01-26

## 2021-01-26 RX ORDER — TERCONAZOLE 4 MG/G
0.4 CREAM VAGINAL
Qty: 1 | Refills: 2 | Status: DISCONTINUED | COMMUNITY
Start: 2019-12-12 | End: 2021-01-26

## 2021-01-26 RX ORDER — GABAPENTIN 100 %
POWDER (GRAM) MISCELLANEOUS
Qty: 1 | Refills: 0 | Status: DISCONTINUED | COMMUNITY
Start: 2019-11-19 | End: 2021-01-26

## 2021-01-26 NOTE — PHYSICAL EXAM
[No Respiratory Distress] : no respiratory distress  [No Accessory Muscle Use] : no accessory muscle use [Clear to Auscultation] : lungs were clear to auscultation bilaterally [Normal Rate] : normal rate  [Regular Rhythm] : with a regular rhythm [Normal S1, S2] : normal S1 and S2 [No Edema] : there was no peripheral edema [Soft] : abdomen soft [No HSM] : no HSM [No Rash] : no rash [Normal Gait] : normal gait

## 2021-01-27 DIAGNOSIS — E11.8 TYPE 2 DIABETES MELLITUS WITH UNSPECIFIED COMPLICATIONS: ICD-10-CM

## 2021-01-27 DIAGNOSIS — E55.9 VITAMIN D DEFICIENCY, UNSPECIFIED: ICD-10-CM

## 2021-01-27 DIAGNOSIS — E10.9 TYPE 1 DIABETES MELLITUS WITHOUT COMPLICATIONS: ICD-10-CM

## 2021-02-01 ENCOUNTER — APPOINTMENT (OUTPATIENT)
Dept: PODIATRY | Facility: CLINIC | Age: 36
End: 2021-02-01

## 2021-02-06 NOTE — ASSESSMENT
[FreeTextEntry1] : 35 year old female with a past medical history of chronic pelvic pain secondary to vulvodynia/spasm s/p pudendal nerve block for vulvodynia with no past improvement (recommended spinal stimulator but patient refused it, then estrogen cream was recommended but patient started experiencing breast tenderness and discontinued it, currently asymptomatic), Type 1 DM with chronic polyneuropathy of the lower extremities on an insulin pump, recurrent UTIs (most recently Candida), PCOS (elevated testosterone level of 66), GERD, migraines presenting for a follow up visit.\par Pt presently states that she feels fine. Pt has been having some family issues- so states her diet has been off track. \par \par # DM1:\par -  Ha1c 8.7 up from 8\par - follows with Dr Aguirre- has appt in feb 2021\par - C/w insulin pump\par - endorsed lifestyle changes. \par - follows with opthalmology, podiatry referral given\par \par #) H/o Migraines\par - follows with Neurology\par - c/w Amitriptyline, indomethacin prn\par \par #) Vitamin D insufficiency\par - start on supplementation\par \par # Palpitations/Tachycardia: on Metoprolol 50mg bid. Currently asymptomatic\par \par # GERD: improved with Famotidine\par \par # Chronic pelvic pain, Vulvodynia; on Amitriptyline, Flexeril and Gabapentin. Follows with Urogyn \par \par # HCM \par - pap smear 8/2018- negative\par - pt refuses influenza due to severe adverse reaction per pt, got Pneumovax\par - Mammogram negative in 2020\par - RTC in 3 months

## 2021-02-06 NOTE — HISTORY OF PRESENT ILLNESS
[FreeTextEntry1] : Pt is a 34 yo F who comes to the clinic for follow up.  [de-identified] : 35 year old female with a past medical history of chronic pelvic pain secondary to vulvodynia/spasm s/p pudendal nerve block for vulvodynia with no past improvement (recommended spinal stimulator but patient refused it, then estrogen cream was recommended but patient started experiencing breast tenderness and discontinued it, currently asymptomatic), Type 1 DM with chronic polyneuropathy of the lower extremities on an insulin pump, recurrent UTIs (most recently Candida), PCOS (elevated testosterone level of 66), GERD, migraines presenting for a follow up visit.\par Pt presently states that she feels fine. Pt has been having some family issues- so states her diet has been off track. \par No other complaints. \par \par \par \par

## 2021-02-17 ENCOUNTER — APPOINTMENT (OUTPATIENT)
Dept: ENDOCRINOLOGY | Facility: CLINIC | Age: 36
End: 2021-02-17

## 2021-03-08 ENCOUNTER — APPOINTMENT (OUTPATIENT)
Dept: PODIATRY | Facility: CLINIC | Age: 36
End: 2021-03-08

## 2021-03-15 ENCOUNTER — APPOINTMENT (OUTPATIENT)
Dept: ENDOCRINOLOGY | Facility: CLINIC | Age: 36
End: 2021-03-15
Payer: MEDICAID

## 2021-03-15 VITALS
BODY MASS INDEX: 30.39 KG/M2 | DIASTOLIC BLOOD PRESSURE: 74 MMHG | SYSTOLIC BLOOD PRESSURE: 130 MMHG | WEIGHT: 178 LBS | HEIGHT: 64 IN | OXYGEN SATURATION: 97 % | HEART RATE: 79 BPM | TEMPERATURE: 97 F

## 2021-03-15 PROCEDURE — 99214 OFFICE O/P EST MOD 30 MIN: CPT

## 2021-03-15 PROCEDURE — 99072 ADDL SUPL MATRL&STAF TM PHE: CPT

## 2021-03-15 RX ORDER — CLOTRIMAZOLE AND BETAMETHASONE DIPROPIONATE 10; .5 MG/G; MG/G
1-0.05 CREAM TOPICAL TWICE DAILY
Qty: 1 | Refills: 0 | Status: DISCONTINUED | COMMUNITY
Start: 2019-12-12 | End: 2021-03-15

## 2021-03-15 RX ORDER — INSULIN LISPRO 100 [IU]/ML
100 INJECTION, SOLUTION INTRAVENOUS; SUBCUTANEOUS
Qty: 3 | Refills: 5 | Status: DISCONTINUED | COMMUNITY
Start: 2018-06-15 | End: 2021-03-15

## 2021-03-15 RX ORDER — INSULIN LISPRO 100 [IU]/ML
100 INJECTION, SOLUTION INTRAVENOUS; SUBCUTANEOUS
Qty: 3 | Refills: 11 | Status: DISCONTINUED | COMMUNITY
Start: 2021-01-26 | End: 2021-03-15

## 2021-03-15 RX ORDER — FLUTICASONE PROPIONATE 50 MCG
50 SPRAY, SUSPENSION NASAL
Refills: 0 | Status: DISCONTINUED | COMMUNITY
End: 2021-03-15

## 2021-03-15 NOTE — ASSESSMENT
[Diabetes Foot Care] : diabetes foot care [Long Term Vascular Complications] : long term vascular complications of diabetes [Carbohydrate Consistent Diet] : carbohydrate consistent diet [Importance of Diet and Exercise] : importance of diet and exercise to improve glycemic control, achieve weight loss and improve cardiovascular health [Exercise/Effect on Glucose] : exercise/effect on glucose [Hypoglycemia Management] : hypoglycemia management [Action and use of Insulin] : action and use of short and long-acting insulin [Self Monitoring of Blood Glucose] : self monitoring of blood glucose [Insulin Self-Administration] : insulin self-administration [Injection Technique, Storage, Sharps Disposal] : injection technique, storage, and sharps disposal [FreeTextEntry1] : 35 year old patient with type 1 DM on insulin pump presents for evaluation ( new to me ) \par \par - log reviewed and pattern of fasting as well as prelunch hyperglycemia noted \par - I:C ratio changes from 1:14 g to 1:12 g with breakfast \par - advised not to eat a snack at bedtime if BG >200 and take correction instead , if BG <150 can have snack , advised about type of snack ( very limited food access ) \par - advised not to correct within 2 hours from taking insulin to avoid Stalking and hypoglycemia \par - uptodate with eye exam \par - patient not interested in CGM because of inability to be placed on her arms /legs , very happy with her pump \par - discussed diet and patient was given resources about carb counting \par - follow up in 3 months with me or Dr Velasquez \par - continue vitamin D \par

## 2021-03-15 NOTE — HISTORY OF PRESENT ILLNESS
[Finger Stick] : Finger Stick: Yes [Hypoglycemia] : Patient is hypoglycemic. [Continuous Glucose Monitoring] : Continuous Glucose Monitoring: No [FreeTextEntry1] : log scanned

## 2021-03-15 NOTE — DATA REVIEWED
[FreeTextEntry1] : previous data and record reviewed \par 1/15/21:  hba1c 8.7% glucose 203  LDL 116crea 0.7  AST 15  ALT 18  hb 13.6 \par

## 2021-03-15 NOTE — PHYSICAL EXAM
[Alert] : alert [Well Nourished] : well nourished [Healthy Appearance] : healthy appearance [No Acute Distress] : no acute distress [No Proptosis] : no proptosis [No Lid Lag] : no lid lag [No LAD] : no lymphadenopathy [Thyroid Not Enlarged] : the thyroid was not enlarged [No Thyroid Nodules] : no palpable thyroid nodules [No Accessory Muscle Use] : no accessory muscle use [Clear to Auscultation] : lungs were clear to auscultation bilaterally [Normal S1, S2] : normal S1 and S2 [No Murmurs] : no murmurs [Normal Rate] : heart rate was normal [Not Tender] : non-tender [Soft] : abdomen soft [No CVA Tenderness] : no ~M costovertebral angle tenderness [No Stigmata of Cushings Syndrome] : no stigmata of Cushings Syndrome [Normal Reflexes] : deep tendon reflexes were 2+ and symmetric [Oriented x3] : oriented to person, place, and time [Abdominal Striae] : no abdominal striae [Acanthosis Nigricans] : no acanthosis nigricans [Hirsutism] : no hirsutism

## 2021-03-29 ENCOUNTER — APPOINTMENT (OUTPATIENT)
Dept: PODIATRY | Facility: CLINIC | Age: 36
End: 2021-03-29
Payer: MEDICAID

## 2021-03-29 ENCOUNTER — OUTPATIENT (OUTPATIENT)
Dept: OUTPATIENT SERVICES | Facility: HOSPITAL | Age: 36
LOS: 1 days | Discharge: HOME | End: 2021-03-29

## 2021-03-29 PROCEDURE — 99213 OFFICE O/P EST LOW 20 MIN: CPT

## 2021-03-31 DIAGNOSIS — B35.1 TINEA UNGUIUM: ICD-10-CM

## 2021-03-31 DIAGNOSIS — E11.8 TYPE 2 DIABETES MELLITUS WITH UNSPECIFIED COMPLICATIONS: ICD-10-CM

## 2021-03-31 DIAGNOSIS — L60.3 NAIL DYSTROPHY: ICD-10-CM

## 2021-03-31 DIAGNOSIS — M79.671 PAIN IN RIGHT FOOT: ICD-10-CM

## 2021-03-31 DIAGNOSIS — E11.9 TYPE 2 DIABETES MELLITUS WITHOUT COMPLICATIONS: ICD-10-CM

## 2021-03-31 DIAGNOSIS — L85.3 XEROSIS CUTIS: ICD-10-CM

## 2021-03-31 NOTE — PHYSICAL EXAM
[General Appearance - Alert] : alert [General Appearance - In No Acute Distress] : in no acute distress [Abnormal Walk] : normal gait [Nail Clubbing] : no clubbing  or cyanosis of the fingernails [Involuntary Movements] : no involuntary movements were seen [Motor Tone] : muscle strength and tone were normal [Skin Color & Pigmentation] : normal skin color and pigmentation [] : no rash [Right Foot Was Examined] : The right foot was examined [Left Foot Was Examined] : The left foot was examined [Normal Appearance] : normal in appearance [Normal in Appearance] : normal in appearance [Tenderness] : tender [1+] : 1+ in the dorsalis pedis [Deep Tendon Reflexes (DTR)] : deep tendon reflexes were 2+ and symmetric [Sensation] : the sensory exam was normal to light touch and pinprick [No Focal Deficits] : no focal deficits [Oriented To Time, Place, And Person] : oriented to person, place, and time [Impaired Insight] : insight and judgment were intact [Affect] : the affect was normal [Delayed in the Right Toes] : normal in the toes [Full ROM] : with limited range of motion [Delayed in the Left Toes] : normal in the toes

## 2021-03-31 NOTE — ASSESSMENT
[FreeTextEntry1] : Patient examined, history and chart reviewed\par Patient tolerated procedure well\par patient educated on diabetic foot health and maintenance at length \par Follow up in 6 months or PRN\par control glucose \par \par \par

## 2021-03-31 NOTE — HISTORY OF PRESENT ILLNESS
[FreeTextEntry1] : CRISTI MONTGOMERY   presents for a foot examination, patient was referred by PCP. Patient complains of  pain of both feet and denies an acute injury.  Patient states pain is worse at and about the nail folds. Patient denies NVFC and denies SOB.\par last hgA1c 8.7% (1/22/2021)

## 2021-04-02 ENCOUNTER — APPOINTMENT (OUTPATIENT)
Dept: OBGYN | Facility: CLINIC | Age: 36
End: 2021-04-02
Payer: MEDICAID

## 2021-04-02 DIAGNOSIS — N76.2 ACUTE VULVITIS: ICD-10-CM

## 2021-04-02 DIAGNOSIS — Z01.411 ENCOUNTER FOR GYNECOLOGICAL EXAMINATION (GENERAL) (ROUTINE) WITH ABNORMAL FINDINGS: ICD-10-CM

## 2021-04-02 DIAGNOSIS — R30.0 DYSURIA: ICD-10-CM

## 2021-04-02 PROCEDURE — 99072 ADDL SUPL MATRL&STAF TM PHE: CPT

## 2021-04-02 PROCEDURE — 99395 PREV VISIT EST AGE 18-39: CPT

## 2021-04-02 PROCEDURE — 99213 OFFICE O/P EST LOW 20 MIN: CPT | Mod: 25

## 2021-04-02 NOTE — PHYSICAL EXAM
[Appropriately responsive] : appropriately responsive [Alert] : alert [No Acute Distress] : no acute distress [No Lymphadenopathy] : no lymphadenopathy [Regular Rate Rhythm] : regular rate rhythm [No Murmurs] : no murmurs [Clear to Auscultation B/L] : clear to auscultation bilaterally [Soft] : soft [Non-tender] : non-tender [No HSM] : No HSM [Non-distended] : non-distended [No Lesions] : no lesions [No Mass] : no mass [Oriented x3] : oriented x3 [Examination Of The Breasts] : a normal appearance [No Masses] : no breast masses were palpable [Vulvitis] : vulvitis [Labia Majora] : normal [Labia Minora] : normal [Normal] : normal [Uterine Adnexae] : normal

## 2021-04-02 NOTE — HISTORY OF PRESENT ILLNESS
[FreeTextEntry1] : Patient is 35 years old para 0-0-0-0 last menstrual period March 8, 2021, history of DM type I\par She notes regular menstrual periods every month\par She states that she recently had a urinary tract infection and recently finished Bactrim\par She notes burning on urination and vulvar irritation/inflammation\par Urine dip is noted to be within normal limits

## 2021-04-02 NOTE — DISCUSSION/SUMMARY
[FreeTextEntry1] : Pap done \par Self breast exam stressed\par Prescribed Lotrisone cream to be applied twice daily x7 days\par Follow-up 1 month or as needed

## 2021-04-20 NOTE — HISTORY OF PRESENT ILLNESS
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 type 1 DM, recurrent UTIs, and palpitations\par she follows up with cardiology, urology, endocrinology for these problems. she sees ENT for TMJ and left ear chronic pain. \par presents today for regular follow up, complains that the vaginal pain is not controlled on current plan asking to be referred to pain management for further options \par  several years ago

## 2021-04-26 ENCOUNTER — OUTPATIENT (OUTPATIENT)
Dept: OUTPATIENT SERVICES | Facility: HOSPITAL | Age: 36
LOS: 1 days | Discharge: HOME | End: 2021-04-26

## 2021-04-26 ENCOUNTER — APPOINTMENT (OUTPATIENT)
Dept: INTERNAL MEDICINE | Facility: CLINIC | Age: 36
End: 2021-04-26
Payer: MEDICAID

## 2021-04-26 VITALS
HEART RATE: 99 BPM | DIASTOLIC BLOOD PRESSURE: 84 MMHG | OXYGEN SATURATION: 98 % | HEIGHT: 64 IN | SYSTOLIC BLOOD PRESSURE: 136 MMHG | TEMPERATURE: 98.9 F | WEIGHT: 176 LBS | BODY MASS INDEX: 30.05 KG/M2

## 2021-04-26 DIAGNOSIS — E10.9 TYPE 1 DIABETES MELLITUS WITHOUT COMPLICATIONS: ICD-10-CM

## 2021-04-26 DIAGNOSIS — E66.9 OBESITY, UNSPECIFIED: ICD-10-CM

## 2021-04-26 DIAGNOSIS — J45.909 UNSPECIFIED ASTHMA, UNCOMPLICATED: ICD-10-CM

## 2021-04-26 PROCEDURE — 99213 OFFICE O/P EST LOW 20 MIN: CPT | Mod: GC

## 2021-04-26 NOTE — PHYSICAL EXAM
[Normal] : soft, non-tender, non-distended, no masses palpated, no HSM and normal bowel sounds [No Focal Deficits] : no focal deficits [Normal Affect] : the affect was normal [Normal Mood] : the mood was normal

## 2021-05-03 NOTE — ASSESSMENT
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

## 2021-05-03 NOTE — HISTORY OF PRESENT ILLNESS
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 Is following routinely with Endocrine, insulin pump was recently adjusted. Has follow up in June. Is following routinely with Optho, Podiatry. \par Was previously following with Urogyn for vulvodynia, but stopped following due to no therapies or treatment being offered. Was recommend to go to [place] to a vulvodynia specialist, however, cannot go there due to transportation issues because she lives in  group home.

## 2021-05-03 NOTE — PLAN
[FreeTextEntry1] : #DM Type I\par - HbA1c 8.7%\par - Follows with Endo, has follow up in June\par - Follows with Optho, Podiatry\par - C/w insulin pump\par \par #Hx of Migraines\par - Well controlled with NSAIDs as needed\par \par #Hx of Palpitations, Tachycardia\par - Reports significant improvement, has occasional palpitations\par - C/w Metoprolol 50 mg BID\par \par #GERD\par - C/w Famotidine\par \par #Chronic pelvic pain, vulvodynia\par - Was following with Uro gyn, no longer following due to no relief offered \par - C/w Amitriptyline, Flexeril\par \par #HCM\par - Pap Smear done this month, pending result\par - Mammo done 2020 BIRADS 1

## 2021-05-04 ENCOUNTER — APPOINTMENT (OUTPATIENT)
Dept: OBGYN | Facility: CLINIC | Age: 36
End: 2021-05-04
Payer: MEDICAID

## 2021-05-04 VITALS
HEIGHT: 64 IN | WEIGHT: 176 LBS | BODY MASS INDEX: 30.05 KG/M2 | SYSTOLIC BLOOD PRESSURE: 125 MMHG | TEMPERATURE: 97.7 F | DIASTOLIC BLOOD PRESSURE: 78 MMHG

## 2021-05-04 DIAGNOSIS — B37.3 CANDIDIASIS OF VULVA AND VAGINA: ICD-10-CM

## 2021-05-04 DIAGNOSIS — N76.2 ACUTE VULVITIS: ICD-10-CM

## 2021-05-04 DIAGNOSIS — Z87.42 PERSONAL HISTORY OF OTHER DISEASES OF THE FEMALE GENITAL TRACT: ICD-10-CM

## 2021-05-04 PROCEDURE — 99072 ADDL SUPL MATRL&STAF TM PHE: CPT

## 2021-05-04 PROCEDURE — 99214 OFFICE O/P EST MOD 30 MIN: CPT

## 2021-05-04 NOTE — HISTORY OF PRESENT ILLNESS
[FreeTextEntry1] : Patient is 36 years old para 0-0-0-0 last menstrual period April 10, 2021\par She has a history of DM type I\par Patient complains of recurrent vaginitis and vulvar irritation/inflammation

## 2021-05-04 NOTE — DISCUSSION/SUMMARY
[FreeTextEntry1] : B VV test done\par Prescribed terconazole 3 and nystatin-triamcinolone ointment\par Follow-up 3 months or as needed\par

## 2021-05-19 ENCOUNTER — APPOINTMENT (OUTPATIENT)
Dept: NEUROLOGY | Facility: CLINIC | Age: 36
End: 2021-05-19
Payer: MEDICAID

## 2021-05-19 VITALS
TEMPERATURE: 97.9 F | OXYGEN SATURATION: 98 % | HEIGHT: 64 IN | DIASTOLIC BLOOD PRESSURE: 85 MMHG | WEIGHT: 177 LBS | BODY MASS INDEX: 30.22 KG/M2 | HEART RATE: 92 BPM | SYSTOLIC BLOOD PRESSURE: 130 MMHG

## 2021-05-19 PROCEDURE — 99214 OFFICE O/P EST MOD 30 MIN: CPT

## 2021-05-19 NOTE — REVIEW OF SYSTEMS
[Numbness] : numbness [Tingling] : tingling [Abnormal Sensation] : an abnormal sensation [Migraine Headache] : migraine headaches [Tension Headache] : tension-type headaches [Negative] : Eyes

## 2021-05-19 NOTE — HISTORY OF PRESENT ILLNESS
[FreeTextEntry1] : CRISTI MONTGOMERY is a 36 year old woman if here as a follow up visit. She is former patient of Dr Engle been following with her for headache,right hand tremor, polyneuropathy, vulvodynia and clitoral neuropathy. She reports allodynia in her vagina and burning sensation in her feet . She is on Elavil 75 mg qhs and could not tolerate 100 mg in the past.  She tried gabapentin, Cymbalta, pudendal nerve block and creams and none of them were helpful. She developed tremor after using  gabapentin. For headache she takes Elavil 75 mg qhs and takes Indomethacin PRN for every few months. Her shaking is better and headaches are better controlled. Reports dropping objects occasionally. Reports that her left ankle is twisted outward.

## 2021-05-19 NOTE — ASSESSMENT
[FreeTextEntry1] : 37 yo female with hx of tremor, DM POLYNEUROPATHY, vulvodynia, clitoral neuropathy and headaches is here for follow up visit. Headache and tremor are well controlled. Still reports burning sensation in her feet and vagina. Also noted to have left ankle twisted outwards with no significant weakness. \par \par - Cont elavil 75 mg qd and indomethacin 25 mg prn for ha\par - Cont flexeril 10 mg prn for tremor\par - PT eval for left ankle \par - RTC in 6 months \par \par \par

## 2021-05-19 NOTE — PHYSICAL EXAM
[FreeTextEntry1] : Neurological Exam: \par Mental status: Awake, alert and oriented x3. Recent and remote memory intact. Naming, repetition and comprehension intact. Attention/concentration intact. No dysarthria, no aphasia. Fund of knowledge appropriate. \par Cranial nerves: Pupils equally round and reactive to light, visual fields full, no nystagmus, extraocular muscles intact, V1 through V3 intact bilaterally and symmetric, face symmetric, hearing intact to finger rub, palate elevation symmetric, tongue was midline.\par Motor: MRC grading 5/5 b/l UE/LE.  strength 5/5. Normal tone and bulk. No abnormal movements. OUtward rotated ankle.  \par Sensation: Intact to light touch, proprioception, and pinprick. \par Coordination: No dysmetria on finger-to-nose and heel-to-shin. No dysdiadokinesia.\par Reflexes: hyporeflexic\par Gait: Narrow and steady. No ataxia. Romberg negative\par

## 2021-05-25 ENCOUNTER — NON-APPOINTMENT (OUTPATIENT)
Age: 36
End: 2021-05-25

## 2021-06-01 ENCOUNTER — EMERGENCY (EMERGENCY)
Facility: HOSPITAL | Age: 36
LOS: 0 days | Discharge: HOME | End: 2021-06-02
Attending: EMERGENCY MEDICINE
Payer: MEDICAID

## 2021-06-01 VITALS
RESPIRATION RATE: 18 BRPM | HEIGHT: 64 IN | WEIGHT: 179.9 LBS | TEMPERATURE: 98 F | DIASTOLIC BLOOD PRESSURE: 86 MMHG | OXYGEN SATURATION: 97 % | HEART RATE: 115 BPM | SYSTOLIC BLOOD PRESSURE: 135 MMHG

## 2021-06-01 DIAGNOSIS — Z88.8 ALLERGY STATUS TO OTHER DRUGS, MEDICAMENTS AND BIOLOGICAL SUBSTANCES STATUS: ICD-10-CM

## 2021-06-01 DIAGNOSIS — Z88.0 ALLERGY STATUS TO PENICILLIN: ICD-10-CM

## 2021-06-01 DIAGNOSIS — Z87.448 PERSONAL HISTORY OF OTHER DISEASES OF URINARY SYSTEM: ICD-10-CM

## 2021-06-01 DIAGNOSIS — Z79.4 LONG TERM (CURRENT) USE OF INSULIN: ICD-10-CM

## 2021-06-01 DIAGNOSIS — Z88.1 ALLERGY STATUS TO OTHER ANTIBIOTIC AGENTS STATUS: ICD-10-CM

## 2021-06-01 DIAGNOSIS — Z86.59 PERSONAL HISTORY OF OTHER MENTAL AND BEHAVIORAL DISORDERS: ICD-10-CM

## 2021-06-01 DIAGNOSIS — Z79.899 OTHER LONG TERM (CURRENT) DRUG THERAPY: ICD-10-CM

## 2021-06-01 DIAGNOSIS — Z86.79 PERSONAL HISTORY OF OTHER DISEASES OF THE CIRCULATORY SYSTEM: ICD-10-CM

## 2021-06-01 DIAGNOSIS — E10.9 TYPE 1 DIABETES MELLITUS WITHOUT COMPLICATIONS: ICD-10-CM

## 2021-06-01 DIAGNOSIS — R10.31 RIGHT LOWER QUADRANT PAIN: ICD-10-CM

## 2021-06-01 DIAGNOSIS — K21.9 GASTRO-ESOPHAGEAL REFLUX DISEASE WITHOUT ESOPHAGITIS: ICD-10-CM

## 2021-06-01 DIAGNOSIS — Z86.69 PERSONAL HISTORY OF OTHER DISEASES OF THE NERVOUS SYSTEM AND SENSE ORGANS: ICD-10-CM

## 2021-06-01 DIAGNOSIS — R10.9 UNSPECIFIED ABDOMINAL PAIN: ICD-10-CM

## 2021-06-01 LAB
ALBUMIN SERPL ELPH-MCNC: 4.3 G/DL — SIGNIFICANT CHANGE UP (ref 3.5–5.2)
ALP SERPL-CCNC: 122 U/L — HIGH (ref 30–115)
ALT FLD-CCNC: 22 U/L — SIGNIFICANT CHANGE UP (ref 0–41)
ANION GAP SERPL CALC-SCNC: 9 MMOL/L — SIGNIFICANT CHANGE UP (ref 7–14)
APPEARANCE UR: CLEAR — SIGNIFICANT CHANGE UP
AST SERPL-CCNC: 16 U/L — SIGNIFICANT CHANGE UP (ref 0–41)
BASOPHILS # BLD AUTO: 0.05 K/UL — SIGNIFICANT CHANGE UP (ref 0–0.2)
BASOPHILS NFR BLD AUTO: 0.5 % — SIGNIFICANT CHANGE UP (ref 0–1)
BILIRUB DIRECT SERPL-MCNC: <0.2 MG/DL — SIGNIFICANT CHANGE UP (ref 0–0.2)
BILIRUB INDIRECT FLD-MCNC: SIGNIFICANT CHANGE UP MG/DL (ref 0.2–1.2)
BILIRUB SERPL-MCNC: <0.2 MG/DL — SIGNIFICANT CHANGE UP (ref 0.2–1.2)
BILIRUB UR-MCNC: NEGATIVE — SIGNIFICANT CHANGE UP
BUN SERPL-MCNC: 10 MG/DL — SIGNIFICANT CHANGE UP (ref 10–20)
CALCIUM SERPL-MCNC: 9 MG/DL — SIGNIFICANT CHANGE UP (ref 8.5–10.1)
CHLORIDE SERPL-SCNC: 100 MMOL/L — SIGNIFICANT CHANGE UP (ref 98–110)
CO2 SERPL-SCNC: 25 MMOL/L — SIGNIFICANT CHANGE UP (ref 17–32)
COLOR SPEC: YELLOW — SIGNIFICANT CHANGE UP
CREAT SERPL-MCNC: 0.6 MG/DL — LOW (ref 0.7–1.5)
DIFF PNL FLD: NEGATIVE — SIGNIFICANT CHANGE UP
EOSINOPHIL # BLD AUTO: 0.18 K/UL — SIGNIFICANT CHANGE UP (ref 0–0.7)
EOSINOPHIL NFR BLD AUTO: 1.8 % — SIGNIFICANT CHANGE UP (ref 0–8)
GLUCOSE SERPL-MCNC: 330 MG/DL — HIGH (ref 70–99)
GLUCOSE UR QL: ABNORMAL
HCG SERPL QL: NEGATIVE — SIGNIFICANT CHANGE UP
HCT VFR BLD CALC: 37 % — SIGNIFICANT CHANGE UP (ref 37–47)
HGB BLD-MCNC: 12.4 G/DL — SIGNIFICANT CHANGE UP (ref 12–16)
IMM GRANULOCYTES NFR BLD AUTO: 0.3 % — SIGNIFICANT CHANGE UP (ref 0.1–0.3)
KETONES UR-MCNC: NEGATIVE — SIGNIFICANT CHANGE UP
LACTATE SERPL-SCNC: 1.1 MMOL/L — SIGNIFICANT CHANGE UP (ref 0.7–2)
LEUKOCYTE ESTERASE UR-ACNC: NEGATIVE — SIGNIFICANT CHANGE UP
LIDOCAIN IGE QN: 11 U/L — SIGNIFICANT CHANGE UP (ref 7–60)
LYMPHOCYTES # BLD AUTO: 3.04 K/UL — SIGNIFICANT CHANGE UP (ref 1.2–3.4)
LYMPHOCYTES # BLD AUTO: 30.9 % — SIGNIFICANT CHANGE UP (ref 20.5–51.1)
MCHC RBC-ENTMCNC: 29.3 PG — SIGNIFICANT CHANGE UP (ref 27–31)
MCHC RBC-ENTMCNC: 33.5 G/DL — SIGNIFICANT CHANGE UP (ref 32–37)
MCV RBC AUTO: 87.5 FL — SIGNIFICANT CHANGE UP (ref 81–99)
MONOCYTES # BLD AUTO: 0.65 K/UL — HIGH (ref 0.1–0.6)
MONOCYTES NFR BLD AUTO: 6.6 % — SIGNIFICANT CHANGE UP (ref 1.7–9.3)
NEUTROPHILS # BLD AUTO: 5.9 K/UL — SIGNIFICANT CHANGE UP (ref 1.4–6.5)
NEUTROPHILS NFR BLD AUTO: 59.9 % — SIGNIFICANT CHANGE UP (ref 42.2–75.2)
NITRITE UR-MCNC: NEGATIVE — SIGNIFICANT CHANGE UP
NRBC # BLD: 0 /100 WBCS — SIGNIFICANT CHANGE UP (ref 0–0)
PH UR: 6.5 — SIGNIFICANT CHANGE UP (ref 5–8)
PLATELET # BLD AUTO: 356 K/UL — SIGNIFICANT CHANGE UP (ref 130–400)
POTASSIUM SERPL-MCNC: 4.2 MMOL/L — SIGNIFICANT CHANGE UP (ref 3.5–5)
POTASSIUM SERPL-SCNC: 4.2 MMOL/L — SIGNIFICANT CHANGE UP (ref 3.5–5)
PROT SERPL-MCNC: 7.2 G/DL — SIGNIFICANT CHANGE UP (ref 6–8)
PROT UR-MCNC: SIGNIFICANT CHANGE UP
RBC # BLD: 4.23 M/UL — SIGNIFICANT CHANGE UP (ref 4.2–5.4)
RBC # FLD: 12.3 % — SIGNIFICANT CHANGE UP (ref 11.5–14.5)
SODIUM SERPL-SCNC: 134 MMOL/L — LOW (ref 135–146)
SP GR SPEC: 1.03 — HIGH (ref 1.01–1.03)
UROBILINOGEN FLD QL: SIGNIFICANT CHANGE UP
WBC # BLD: 9.85 K/UL — SIGNIFICANT CHANGE UP (ref 4.8–10.8)
WBC # FLD AUTO: 9.85 K/UL — SIGNIFICANT CHANGE UP (ref 4.8–10.8)

## 2021-06-01 PROCEDURE — 99285 EMERGENCY DEPT VISIT HI MDM: CPT

## 2021-06-01 PROCEDURE — 71045 X-RAY EXAM CHEST 1 VIEW: CPT | Mod: 26

## 2021-06-01 RX ORDER — SODIUM CHLORIDE 9 MG/ML
1000 INJECTION INTRAMUSCULAR; INTRAVENOUS; SUBCUTANEOUS ONCE
Refills: 0 | Status: COMPLETED | OUTPATIENT
Start: 2021-06-01 | End: 2021-06-01

## 2021-06-01 RX ORDER — ACETAMINOPHEN 500 MG
975 TABLET ORAL ONCE
Refills: 0 | Status: COMPLETED | OUTPATIENT
Start: 2021-06-01 | End: 2021-06-01

## 2021-06-01 RX ORDER — SODIUM CHLORIDE 9 MG/ML
1000 INJECTION, SOLUTION INTRAVENOUS ONCE
Refills: 0 | Status: COMPLETED | OUTPATIENT
Start: 2021-06-01 | End: 2021-06-01

## 2021-06-01 RX ADMIN — Medication 975 MILLIGRAM(S): at 22:40

## 2021-06-01 RX ADMIN — SODIUM CHLORIDE 1000 MILLILITER(S): 9 INJECTION INTRAMUSCULAR; INTRAVENOUS; SUBCUTANEOUS at 22:40

## 2021-06-01 NOTE — ED PROVIDER NOTE - CARE PROVIDER_API CALL
Amadeo Jones  GASTROENTEROLOGY  [address] [place] [address]  Phone: [phone]  Fax: [phone]  Follow Up Time: 4-6 Days    Lyndsey Arellano)  Gastroenterology  [address] [place] [address]  Phone: [phone]  Fax: [phone]  Follow Up Time: 4-6 Days    Fatmata Jarrell  GASTROENTEROLOGY  [address] [place] [address]  Phone: [phone]  Fax: [phone]  Follow Up Time: 4-6 Days    Shaka Saab)  Gastroenterology; Internal Medicine  [address] [place] [address]  Phone: [phone]  Fax: [phone]  Follow Up Time: 4-6 Days

## 2021-06-01 NOTE — ED PROVIDER NOTE - PROVIDER TOKENS
PROVIDER:[TOKEN:[[id number]],FOLLOWUP:[4-6 Days]],PROVIDER:[TOKEN:[[id number]],FOLLOWUP:[4-6 Days]],PROVIDER:[TOKEN:[[id number]],FOLLOWUP:[4-6 Days]],PROVIDER:[TOKEN:[[id number]],FOLLOWUP:[4-6 Days]]

## 2021-06-01 NOTE — ED PROVIDER NOTE - OBJECTIVE STATEMENT
35 yo F with PMHx of depression, DM, tachycardia, and GERD presents to the ED for evaluation of abdominal pain x 2 weeks. Pain is mostly above her belly button and wraps around to her back. Pt states she thought her symptoms were related to constipation but she drank MOM and has BM but pain is persisting so she came to ED. She denies other complaints. Pt denies fever, chills, nausea, vomiting, diarrhea, headache, dizziness, weakness, chest pain, SOB, back pain, LOC, trauma, urinary symptoms, cough, calf pain/swelling, recent travel, recent surgery.

## 2021-06-01 NOTE — ED PROVIDER NOTE - PATIENT PORTAL LINK FT
You can access the FollowMyHealth Patient Portal offered by [facility] by registering at the following website: [email or web address]. By joining [employer]’s FollowMyHealth portal, you will also be able to view your health information using other applications (apps) compatible with our system.

## 2021-06-01 NOTE — ED PROVIDER NOTE - PROGRESS NOTE DETAILS
Pt reports improvement in symptoms. Discussed results and provided copy to pt. Pt understands to follow-up with PMD/GI. Pt understands to return to ED if symptoms return/worsen. Agreeable to discharge. KT: pt discharged, awaiting EMS transport back to [facility]; Dr. Vonda krishna

## 2021-06-01 NOTE — ED PROVIDER NOTE - PHYSICAL EXAMINATION
VITAL SIGNS: I have reviewed nursing notes and confirm.  CONSTITUTIONAL: Well-developed; well-nourished; in no acute distress.  SKIN: Skin exam is warm and dry, no acute rash.  HEAD: Normocephalic; atraumatic.  EYES: Conjunctiva and sclera clear.  ENT: No nasal discharge; airway clear.   CARD: S1, S2 normal; no murmurs, gallops, or rubs. Regular rate and rhythm.  RESP: No wheezes, rales or rhonchi. Speaking in full sentences.   ABD: Normal bowel sounds; soft; non-distended; (+) RLQ TTP; No rebound or guarding. No CVA tenderness.  EXT: Normal ROM. No clubbing, cyanosis or edema.  NEURO: Alert, oriented. Grossly unremarkable. No focal deficits.

## 2021-06-01 NOTE — ED PROVIDER NOTE - CARE PROVIDERS DIRECT ADDRESSES
,DirectAddress_Unknown,[email or web address],DirectAddress_Unknown,[email or web address]

## 2021-06-01 NOTE — ED ADULT NURSE NOTE - CHIEF COMPLAINT QUOTE
I've been having pain above my belly button going to both sides in the back for two weeks, I've been constipated also, I took MOM today and I went to the bathroom but it still hurts - patient    Mom at bedside, patient took her routine Metoprolol 50 mg PO and Pepcid 40 mg PO while waiting in the ambulance bay for a room assignment

## 2021-06-01 NOTE — ED PROVIDER NOTE - NS ED ROS FT
Review of Systems  Constitutional:  No fever, chills.  Eyes:  No visual changes, eye pain, or discharge.  ENMT:  No hearing changes, pain, or discharge. No nasal congestion, discharge, or bleeding. No throat pain, swelling, or difficulty swallowing.  Cardiac:  No chest pain, palpitations, syncope, or edema.  Respiratory:  No dyspnea, cough. No hemoptysis.  GI:  No nausea, vomiting, diarrhea. (+) abd pain  :  No dysuria, hematuria, frequency, or burning.   MS:  No back pain.  Skin:  No skin rash, pruritis, jaundice, or lesions.  Neuro:  No headache, dizziness, loss of sensation, or focal weakness.  No change in mental status.

## 2021-06-01 NOTE — ED PROVIDER NOTE - CLINICAL SUMMARY MEDICAL DECISION MAKING FREE TEXT BOX
Authored by Dr. Love Mandjuano: s/o to me by dr. Green - 36F p/w abd pain - ua/labs nl, s/o to me pending ct ap which was nl - all results d/w pt & copies given, strict return precautions discussed, rec outpt GI f/u

## 2021-06-01 NOTE — ED PROVIDER NOTE - ATTENDING CONTRIBUTION TO CARE
36 yr old f w/ a pmh significant for GERD, depression, DM who presents with abd pain. Pt states that for the past couple of days she has been having constipation and abd pain. Pt states that the pain is mostly "above the belly button". Pt did attempt milk of mg today and had a bowel movement but continues to have abd pain. Pt denies any nausea, vomiting, vaginal discharge, urinary symptoms, fevers, chills, sob, chest pain. Pt also denies any history of STIs or recent sexual activity.     VITAL SIGNS: I have reviewed nursing notes and confirm.  CONSTITUTIONAL: non-toxic, well appearing  SKIN: no rash, no petechiae.  EYES: PERRL, EOMI, pink conjunctiva, anicteric  ENT: tongue midline, no exudates, MMM  NECK: Supple; no meningismus, no JVD  CARD: RRR, no murmurs, equal radial pulses bilaterally 2+  RESP: CTAB, no respiratory distress  ABD: Soft, tender in the lower abd, non-distended, no peritoneal signs, no HSM, no CVA tenderness  EXT: Normal ROM x4. No edema. No calves tenderness  NEURO: Alert, oriented. CN2-12 intact, equal strength bilaterally, nl gait.  PSYCH: Cooperative, appropriate.    a/p  36 yr old f that presents with abd pain   -labs  -ua  -imaging  -reassess  -dispo pending

## 2021-06-02 VITALS
TEMPERATURE: 97 F | SYSTOLIC BLOOD PRESSURE: 113 MMHG | RESPIRATION RATE: 17 BRPM | HEART RATE: 87 BPM | OXYGEN SATURATION: 98 % | DIASTOLIC BLOOD PRESSURE: 56 MMHG

## 2021-06-02 PROCEDURE — 74177 CT ABD & PELVIS W/CONTRAST: CPT | Mod: 26,ME

## 2021-06-02 PROCEDURE — 93010 ELECTROCARDIOGRAM REPORT: CPT

## 2021-06-02 PROCEDURE — G1004: CPT

## 2021-06-02 RX ADMIN — SODIUM CHLORIDE 1000 MILLILITER(S): 9 INJECTION, SOLUTION INTRAVENOUS at 00:21

## 2021-06-02 NOTE — ED ADULT NURSE REASSESSMENT NOTE - NS ED NURSE REASSESS COMMENT FT1
Pt awaiting EMS transport since 2 AM; [facility] called and found out ETA is about 15 mins. Pt & mother informed and understanding.

## 2021-06-08 ENCOUNTER — APPOINTMENT (OUTPATIENT)
Dept: ENDOCRINOLOGY | Facility: CLINIC | Age: 36
End: 2021-06-08
Payer: MEDICAID

## 2021-06-08 VITALS
DIASTOLIC BLOOD PRESSURE: 80 MMHG | HEART RATE: 84 BPM | SYSTOLIC BLOOD PRESSURE: 130 MMHG | WEIGHT: 179 LBS | HEIGHT: 64 IN | TEMPERATURE: 97.2 F | OXYGEN SATURATION: 98 % | BODY MASS INDEX: 30.56 KG/M2

## 2021-06-08 PROCEDURE — 99214 OFFICE O/P EST MOD 30 MIN: CPT

## 2021-06-08 NOTE — ASSESSMENT
[Diabetes Foot Care] : diabetes foot care [Long Term Vascular Complications] : long term vascular complications of diabetes [Carbohydrate Consistent Diet] : carbohydrate consistent diet [Importance of Diet and Exercise] : importance of diet and exercise to improve glycemic control, achieve weight loss and improve cardiovascular health [Exercise/Effect on Glucose] : exercise/effect on glucose [Hypoglycemia Management] : hypoglycemia management [Action and use of Insulin] : action and use of short and long-acting insulin [Self Monitoring of Blood Glucose] : self monitoring of blood glucose [Insulin Self-Administration] : insulin self-administration [Injection Technique, Storage, Sharps Disposal] : injection technique, storage, and sharps disposal [FreeTextEntry1] : 35 year old patient with type 1 DM on insulin pump presents for evaluation ( new to me ) \par \par - log reviewed and pattern of hyperglycemia noted  \par - basal rate increased as follow :\par 12am-6am : 1.4 from 1.2 \par 6am -12pm : 1 u/hr from 0.8 \par 12pm-12am 1.6 from 1.4 u/hr \par also change her i:c for dinner and bedtime snack to 1:12 from 1:14 \par - advised not to correct within 2 hours from taking insulin to avoid Stalking and hypoglycemia \par - uptodate with eye exam , has cataract \par - patient not interested in CGM because of inability to be placed on her arms /legs , very happy with her pump \par - discussed diet and patient was given resources about carb counting \par - follow up in 3 months \par - continue vitamin D \par

## 2021-06-08 NOTE — REVIEW OF SYSTEMS
[Fatigue] : no fatigue [Decreased Appetite] : appetite not decreased [Recent Weight Gain (___ Lbs)] : no recent weight gain [Recent Weight Loss (___ Lbs)] : no recent weight loss [Blurred Vision] : no blurred vision [Visual Field Defect] : no visual field defect [Dysphagia] : no dysphagia [Dysphonia] : no dysphonia [Shortness Of Breath] : no shortness of breath [Cough] : no cough [Orthopnea] : no orthopnea [PND] : no Paroxysmal Nocturnal Dyspnea [Nausea] : no nausea [Constipation] : no constipation [Vomiting] : no vomiting [Diarrhea] : no diarrhea [Nocturia] : no nocturia [Acanthosis] : no acanthosis  [Acne] : no acne [Dry Skin] : no dry skin [Hirsutism] : no hirsutism [Headaches] : no headaches [Tremors] : no tremors [Cold Intolerance] : no cold intolerance [Heat Intolerance] : no heat intolerance

## 2021-06-08 NOTE — DATA REVIEWED
[FreeTextEntry1] : previous data and record reviewed \par 1/15/21:  hba1c 8.7% glucose 203  LDL 116crea 0.7  AST 15  ALT 18  hb 13.6 \par 6/1/21: Hba1c 8.9% glucose 283  crea0.62  \par

## 2021-06-08 NOTE — HISTORY OF PRESENT ILLNESS
[Finger Stick] : Finger Stick: Yes [Continuous Glucose Monitoring] : Continuous Glucose Monitoring: No [Hypoglycemia] : Patient is not hypoglycemic. [FreeTextEntry1] : log scanned

## 2021-07-15 ENCOUNTER — NON-APPOINTMENT (OUTPATIENT)
Age: 36
End: 2021-07-15

## 2021-07-27 ENCOUNTER — APPOINTMENT (OUTPATIENT)
Dept: INTERNAL MEDICINE | Facility: CLINIC | Age: 36
End: 2021-07-27

## 2021-08-18 ENCOUNTER — TRANSCRIPTION ENCOUNTER (OUTPATIENT)
Age: 36
End: 2021-08-18

## 2021-08-18 RX ORDER — LANCETS 33 GAUGE
EACH MISCELLANEOUS
Qty: 1 | Refills: 3 | Status: ACTIVE | COMMUNITY
Start: 2021-03-15 | End: 1900-01-01

## 2021-08-23 ENCOUNTER — RX RENEWAL (OUTPATIENT)
Age: 36
End: 2021-08-23

## 2021-08-26 ENCOUNTER — APPOINTMENT (OUTPATIENT)
Dept: OBGYN | Facility: CLINIC | Age: 36
End: 2021-08-26
Payer: MEDICAID

## 2021-08-26 VITALS — WEIGHT: 182 LBS | DIASTOLIC BLOOD PRESSURE: 80 MMHG | BODY MASS INDEX: 31.24 KG/M2 | SYSTOLIC BLOOD PRESSURE: 124 MMHG

## 2021-08-26 DIAGNOSIS — Z87.42 PERSONAL HISTORY OF OTHER DISEASES OF THE FEMALE GENITAL TRACT: ICD-10-CM

## 2021-08-26 DIAGNOSIS — N76.2 ACUTE VULVITIS: ICD-10-CM

## 2021-08-26 PROCEDURE — 99213 OFFICE O/P EST LOW 20 MIN: CPT

## 2021-08-26 NOTE — PHYSICAL EXAM
[Appropriately responsive] : appropriately responsive [Alert] : alert [No Acute Distress] : no acute distress [No Lymphadenopathy] : no lymphadenopathy [Regular Rate Rhythm] : regular rate rhythm [No Murmurs] : no murmurs [Clear to Auscultation B/L] : clear to auscultation bilaterally [Soft] : soft [Non-tender] : non-tender [Non-distended] : non-distended [No HSM] : No HSM [No Lesions] : no lesions [No Mass] : no mass [Oriented x3] : oriented x3 [Vulvitis] : vulvitis [Tenderness] : tenderness [Discharge] : a  ~M vaginal discharge was present

## 2021-08-31 DIAGNOSIS — B96.89 ACUTE VAGINITIS: ICD-10-CM

## 2021-08-31 DIAGNOSIS — N76.0 ACUTE VAGINITIS: ICD-10-CM

## 2021-09-14 ENCOUNTER — APPOINTMENT (OUTPATIENT)
Dept: OBGYN | Facility: CLINIC | Age: 36
End: 2021-09-14
Payer: MEDICAID

## 2021-09-14 VITALS — WEIGHT: 181 LBS | SYSTOLIC BLOOD PRESSURE: 122 MMHG | BODY MASS INDEX: 31.07 KG/M2 | DIASTOLIC BLOOD PRESSURE: 86 MMHG

## 2021-09-14 DIAGNOSIS — N89.8 OTHER SPECIFIED NONINFLAMMATORY DISORDERS OF VAGINA: ICD-10-CM

## 2021-09-14 DIAGNOSIS — Z87.42 PERSONAL HISTORY OF OTHER DISEASES OF THE FEMALE GENITAL TRACT: ICD-10-CM

## 2021-09-14 PROCEDURE — 99214 OFFICE O/P EST MOD 30 MIN: CPT

## 2021-09-14 NOTE — DISCUSSION/SUMMARY
[FreeTextEntry1] : B VV test done\par Prescribe Diflucan 150 mg #3 as directed\par Advise trial of Aquaphor/petroleum jelly as directed.

## 2021-09-14 NOTE — HISTORY OF PRESENT ILLNESS
[FreeTextEntry1] : Patient is 36 years old para 0-0-0-0 last menstrual period August 8, 2021\par Patient complains of vaginal discharge with burning and itching.  She is status post treatment for Gardnerella with metronidazole 500 mg twice daily x7 days.

## 2021-09-14 NOTE — HISTORY OF PRESENT ILLNESS
[FreeTextEntry1] : Patient is 36 years old last menstrual period August 16, 2021\par She complains of vaginal discharge with vulvar irritation, inflammation and pruritus.

## 2021-09-27 ENCOUNTER — APPOINTMENT (OUTPATIENT)
Dept: PODIATRY | Facility: CLINIC | Age: 36
End: 2021-09-27
Payer: MEDICAID

## 2021-09-27 ENCOUNTER — OUTPATIENT (OUTPATIENT)
Dept: OUTPATIENT SERVICES | Facility: HOSPITAL | Age: 36
LOS: 1 days | Discharge: HOME | End: 2021-09-27

## 2021-09-27 PROCEDURE — 99213 OFFICE O/P EST LOW 20 MIN: CPT

## 2021-09-27 NOTE — ASSESSMENT
[FreeTextEntry1] : Patient examined, history and chart reviewed\par Patient tolerated procedure well\par patient educated on diabetic foot health and maintenance at length \par Follow up in 3 months or PRN\par Xerosis bilateral feet \par rx amlac \par control glucose \par \par \par

## 2021-09-27 NOTE — PHYSICAL EXAM
[General Appearance - Alert] : alert [General Appearance - In No Acute Distress] : in no acute distress [Abnormal Walk] : normal gait [Nail Clubbing] : no clubbing  or cyanosis of the fingernails [Involuntary Movements] : no involuntary movements were seen [Motor Tone] : muscle strength and tone were normal [Skin Color & Pigmentation] : normal skin color and pigmentation [] : no rash [Right Foot Was Examined] : The right foot was examined [Left Foot Was Examined] : The left foot was examined [Normal Appearance] : normal in appearance [Delayed in the Right Toes] : normal in the toes [Normal in Appearance] : normal in appearance [Full ROM] : with limited range of motion [Tenderness] : tender [Delayed in the Left Toes] : normal in the toes [1+] : 1+ in the dorsalis pedis [Deep Tendon Reflexes (DTR)] : deep tendon reflexes were 2+ and symmetric [Sensation] : the sensory exam was normal to light touch and pinprick [No Focal Deficits] : no focal deficits [Oriented To Time, Place, And Person] : oriented to person, place, and time [Impaired Insight] : insight and judgment were intact [Affect] : the affect was normal

## 2021-09-29 DIAGNOSIS — L85.3 XEROSIS CUTIS: ICD-10-CM

## 2021-09-29 DIAGNOSIS — M79.672 PAIN IN LEFT FOOT: ICD-10-CM

## 2021-09-29 DIAGNOSIS — M79.671 PAIN IN RIGHT FOOT: ICD-10-CM

## 2021-09-30 ENCOUNTER — APPOINTMENT (OUTPATIENT)
Dept: INTERNAL MEDICINE | Facility: CLINIC | Age: 36
End: 2021-09-30

## 2021-10-03 ENCOUNTER — EMERGENCY (EMERGENCY)
Facility: HOSPITAL | Age: 36
LOS: 0 days | Discharge: LEFT BEFORE DISCHARGE | End: 2021-10-04
Attending: STUDENT IN AN ORGANIZED HEALTH CARE EDUCATION/TRAINING PROGRAM | Admitting: EMERGENCY MEDICINE
Payer: MEDICAID

## 2021-10-03 VITALS
HEIGHT: 64 IN | TEMPERATURE: 96 F | OXYGEN SATURATION: 99 % | DIASTOLIC BLOOD PRESSURE: 80 MMHG | HEART RATE: 90 BPM | SYSTOLIC BLOOD PRESSURE: 129 MMHG | WEIGHT: 182.1 LBS | RESPIRATION RATE: 20 BRPM

## 2021-10-03 DIAGNOSIS — Z88.0 ALLERGY STATUS TO PENICILLIN: ICD-10-CM

## 2021-10-03 DIAGNOSIS — Z88.7 ALLERGY STATUS TO SERUM AND VACCINE: ICD-10-CM

## 2021-10-03 DIAGNOSIS — M51.34 OTHER INTERVERTEBRAL DISC DEGENERATION, THORACIC REGION: ICD-10-CM

## 2021-10-03 DIAGNOSIS — R07.9 CHEST PAIN, UNSPECIFIED: ICD-10-CM

## 2021-10-03 DIAGNOSIS — Z79.899 OTHER LONG TERM (CURRENT) DRUG THERAPY: ICD-10-CM

## 2021-10-03 DIAGNOSIS — Z87.440 PERSONAL HISTORY OF URINARY (TRACT) INFECTIONS: ICD-10-CM

## 2021-10-03 DIAGNOSIS — E11.40 TYPE 2 DIABETES MELLITUS WITH DIABETIC NEUROPATHY, UNSPECIFIED: ICD-10-CM

## 2021-10-03 DIAGNOSIS — G89.29 OTHER CHRONIC PAIN: ICD-10-CM

## 2021-10-03 DIAGNOSIS — K21.9 GASTRO-ESOPHAGEAL REFLUX DISEASE WITHOUT ESOPHAGITIS: ICD-10-CM

## 2021-10-03 DIAGNOSIS — R06.00 DYSPNEA, UNSPECIFIED: ICD-10-CM

## 2021-10-03 DIAGNOSIS — Z20.822 CONTACT WITH AND (SUSPECTED) EXPOSURE TO COVID-19: ICD-10-CM

## 2021-10-03 DIAGNOSIS — Z88.1 ALLERGY STATUS TO OTHER ANTIBIOTIC AGENTS STATUS: ICD-10-CM

## 2021-10-03 DIAGNOSIS — F32.9 MAJOR DEPRESSIVE DISORDER, SINGLE EPISODE, UNSPECIFIED: ICD-10-CM

## 2021-10-03 LAB
ALBUMIN SERPL ELPH-MCNC: 4.4 G/DL — SIGNIFICANT CHANGE UP (ref 3.5–5.2)
ALP SERPL-CCNC: 124 U/L — HIGH (ref 30–115)
ALT FLD-CCNC: 53 U/L — HIGH (ref 0–41)
ANION GAP SERPL CALC-SCNC: 16 MMOL/L — HIGH (ref 7–14)
AST SERPL-CCNC: 39 U/L — SIGNIFICANT CHANGE UP (ref 0–41)
BASOPHILS # BLD AUTO: 0.07 K/UL — SIGNIFICANT CHANGE UP (ref 0–0.2)
BASOPHILS NFR BLD AUTO: 0.8 % — SIGNIFICANT CHANGE UP (ref 0–1)
BILIRUB SERPL-MCNC: <0.2 MG/DL — SIGNIFICANT CHANGE UP (ref 0.2–1.2)
BUN SERPL-MCNC: 8 MG/DL — LOW (ref 10–20)
CALCIUM SERPL-MCNC: 9.7 MG/DL — SIGNIFICANT CHANGE UP (ref 8.5–10.1)
CHLORIDE SERPL-SCNC: 100 MMOL/L — SIGNIFICANT CHANGE UP (ref 98–110)
CO2 SERPL-SCNC: 20 MMOL/L — SIGNIFICANT CHANGE UP (ref 17–32)
CREAT SERPL-MCNC: 0.6 MG/DL — LOW (ref 0.7–1.5)
EOSINOPHIL # BLD AUTO: 0.11 K/UL — SIGNIFICANT CHANGE UP (ref 0–0.7)
EOSINOPHIL NFR BLD AUTO: 1.2 % — SIGNIFICANT CHANGE UP (ref 0–8)
GLUCOSE SERPL-MCNC: 131 MG/DL — HIGH (ref 70–99)
HCT VFR BLD CALC: 37.8 % — SIGNIFICANT CHANGE UP (ref 37–47)
HGB BLD-MCNC: 12 G/DL — SIGNIFICANT CHANGE UP (ref 12–16)
IMM GRANULOCYTES NFR BLD AUTO: 0.4 % — HIGH (ref 0.1–0.3)
LIDOCAIN IGE QN: 8 U/L — SIGNIFICANT CHANGE UP (ref 7–60)
LYMPHOCYTES # BLD AUTO: 2.15 K/UL — SIGNIFICANT CHANGE UP (ref 1.2–3.4)
LYMPHOCYTES # BLD AUTO: 24 % — SIGNIFICANT CHANGE UP (ref 20.5–51.1)
MAGNESIUM SERPL-MCNC: 1.8 MG/DL — SIGNIFICANT CHANGE UP (ref 1.8–2.4)
MCHC RBC-ENTMCNC: 27.1 PG — SIGNIFICANT CHANGE UP (ref 27–31)
MCHC RBC-ENTMCNC: 31.7 G/DL — LOW (ref 32–37)
MCV RBC AUTO: 85.3 FL — SIGNIFICANT CHANGE UP (ref 81–99)
MONOCYTES # BLD AUTO: 0.69 K/UL — HIGH (ref 0.1–0.6)
MONOCYTES NFR BLD AUTO: 7.7 % — SIGNIFICANT CHANGE UP (ref 1.7–9.3)
NEUTROPHILS # BLD AUTO: 5.9 K/UL — SIGNIFICANT CHANGE UP (ref 1.4–6.5)
NEUTROPHILS NFR BLD AUTO: 65.9 % — SIGNIFICANT CHANGE UP (ref 42.2–75.2)
NRBC # BLD: 0 /100 WBCS — SIGNIFICANT CHANGE UP (ref 0–0)
PLATELET # BLD AUTO: 411 K/UL — HIGH (ref 130–400)
POTASSIUM SERPL-MCNC: 4.8 MMOL/L — SIGNIFICANT CHANGE UP (ref 3.5–5)
POTASSIUM SERPL-SCNC: 4.8 MMOL/L — SIGNIFICANT CHANGE UP (ref 3.5–5)
PROT SERPL-MCNC: 8 G/DL — SIGNIFICANT CHANGE UP (ref 6–8)
RBC # BLD: 4.43 M/UL — SIGNIFICANT CHANGE UP (ref 4.2–5.4)
RBC # FLD: 13.4 % — SIGNIFICANT CHANGE UP (ref 11.5–14.5)
SARS-COV-2 RNA SPEC QL NAA+PROBE: SIGNIFICANT CHANGE UP
SODIUM SERPL-SCNC: 136 MMOL/L — SIGNIFICANT CHANGE UP (ref 135–146)
TROPONIN T SERPL-MCNC: <0.01 NG/ML — SIGNIFICANT CHANGE UP
TROPONIN T SERPL-MCNC: <0.01 NG/ML — SIGNIFICANT CHANGE UP
WBC # BLD: 8.96 K/UL — SIGNIFICANT CHANGE UP (ref 4.8–10.8)
WBC # FLD AUTO: 8.96 K/UL — SIGNIFICANT CHANGE UP (ref 4.8–10.8)

## 2021-10-03 PROCEDURE — 93010 ELECTROCARDIOGRAM REPORT: CPT

## 2021-10-03 PROCEDURE — 71045 X-RAY EXAM CHEST 1 VIEW: CPT | Mod: 26

## 2021-10-03 PROCEDURE — 99220: CPT

## 2021-10-03 PROCEDURE — 76705 ECHO EXAM OF ABDOMEN: CPT | Mod: 26

## 2021-10-03 RX ORDER — FAMOTIDINE 10 MG/ML
20 INJECTION INTRAVENOUS ONCE
Refills: 0 | Status: COMPLETED | OUTPATIENT
Start: 2021-10-03 | End: 2021-10-03

## 2021-10-03 RX ORDER — LOPERAMIDE HCL 2 MG
1 TABLET ORAL
Qty: 0 | Refills: 0 | DISCHARGE

## 2021-10-03 RX ORDER — KETOROLAC TROMETHAMINE 30 MG/ML
15 SYRINGE (ML) INJECTION ONCE
Refills: 0 | Status: DISCONTINUED | OUTPATIENT
Start: 2021-10-03 | End: 2021-10-03

## 2021-10-03 RX ORDER — MAGNESIUM SULFATE 500 MG/ML
2 VIAL (ML) INJECTION ONCE
Refills: 0 | Status: COMPLETED | OUTPATIENT
Start: 2021-10-03 | End: 2021-10-03

## 2021-10-03 RX ORDER — INSULIN LISPRO 100/ML
0 VIAL (ML) SUBCUTANEOUS
Qty: 0 | Refills: 0 | DISCHARGE

## 2021-10-03 RX ORDER — CYCLOBENZAPRINE HYDROCHLORIDE 10 MG/1
10 TABLET, FILM COATED ORAL ONCE
Refills: 0 | Status: COMPLETED | OUTPATIENT
Start: 2021-10-03 | End: 2021-10-03

## 2021-10-03 RX ORDER — METOPROLOL TARTRATE 50 MG
100 TABLET ORAL ONCE
Refills: 0 | Status: COMPLETED | OUTPATIENT
Start: 2021-10-03 | End: 2021-10-03

## 2021-10-03 RX ORDER — AMITRIPTYLINE HCL 25 MG
75 TABLET ORAL ONCE
Refills: 0 | Status: COMPLETED | OUTPATIENT
Start: 2021-10-03 | End: 2021-10-03

## 2021-10-03 RX ADMIN — Medication 2 GRAM(S): at 20:59

## 2021-10-03 RX ADMIN — FAMOTIDINE 104 MILLIGRAM(S): 10 INJECTION INTRAVENOUS at 17:37

## 2021-10-03 RX ADMIN — Medication 15 MILLIGRAM(S): at 17:37

## 2021-10-03 RX ADMIN — FAMOTIDINE 20 MILLIGRAM(S): 10 INJECTION INTRAVENOUS at 18:45

## 2021-10-03 RX ADMIN — Medication 50 GRAM(S): at 19:19

## 2021-10-03 RX ADMIN — FAMOTIDINE 104 MILLIGRAM(S): 10 INJECTION INTRAVENOUS at 12:27

## 2021-10-03 RX ADMIN — Medication 100 MILLIGRAM(S): at 21:16

## 2021-10-03 RX ADMIN — CYCLOBENZAPRINE HYDROCHLORIDE 10 MILLIGRAM(S): 10 TABLET, FILM COATED ORAL at 22:06

## 2021-10-03 RX ADMIN — Medication 75 MILLIGRAM(S): at 22:01

## 2021-10-03 NOTE — ED ADULT NURSE NOTE - NSICDXPASTMEDICALHX_GEN_ALL_CORE_FT
PAST MEDICAL HISTORY:  Degenerative disc disease, thoracic     Gastroesophageal reflux disease, esophagitis presence not specified     Intractable headache, unspecified chronicity pattern, unspecified headache type     Major depressive disorder with single episode, in full remission previously treated with zyprexa, celexa and lexapro    Neuropathy right lower extremity, vaginal    Spinal stenosis     Tachycardia     Tremor of right hand     Type 1 diabetes     Urinary tract infection, recurrent

## 2021-10-03 NOTE — ED PROVIDER NOTE - CLINICAL SUMMARY MEDICAL DECISION MAKING FREE TEXT BOX
ed work up unremarkable, dispo to edou for acs r/o and provocative testing. dr de la cruz called back for adan, recommends ccta

## 2021-10-03 NOTE — ED ADULT TRIAGE NOTE - CHIEF COMPLAINT QUOTE
Patient BIBA from [facility] for midsternal chest pain for 3 months worsening over last 3 days.

## 2021-10-03 NOTE — ED PROVIDER NOTE - ATTENDING CONTRIBUTION TO CARE
36F PMH DM type 1, GERD, depression, p/w 3 months of intermittent substernal chest pain/heaviness, sob. associated. no fever, cough. no tearing bp. no trauma. nonsmoker. no le edema, le pain, immobilization, hormones, hemoptysis. lives at [facility] Resident RAFFI is a resident along with her parents. last stress 2018 was abnormal, unable to obtain CCTA after due to tachycardia. Cards- Dr Moya.    on exam, AFVSS, well wesly nad, ncat, eomi, perrla, mmm, lctab, rrr nl s1s2 no mrg, abd soft ntnd, aaox3, no focal deficits, no le edema or calf ttp,     a/p; CP, r/o ACS. PERC neg. will do labs, ekg/trop, cxr, tele asa, consult pts cardiologist re-eval

## 2021-10-03 NOTE — ED CDU PROVIDER INITIAL DAY NOTE - OBJECTIVE STATEMENT
35 y/o female with a PMH of DM type 1 with insulin pump, tachtycardia on metorprolol tartate 50mg 2x daily,  and MDD presents to the ED from [facility] for evaluation of constant of pressure like midsternal chest pain x 2 months that worsened over the past two days. pt reports today she feels dizzy. pt reports >2 years ago she had a nuclear stress test and echo done but does not know the results. pt report she was sent for CCTA in the past but could not lower her heart rate enough so it was not performed. pt follows cardiologist dr. deleon. pt reports she does not know her family hx of cardiac disease because she is adopted. pt denies cigarette smoking, use of alcohol, illicit drug use, sob, back pain, abdominal pain, n/v/d/c, neck pain, arm pain, jaw pain, headache, or visual changes. pt pending second trop & ekg. malou calderon who signed pt out to obs reports she spoke with dr. deleon partner dr. prado who states to plan for ccta in the AM, and if unable to perform ccta pt will have to undergo cardia cath.

## 2021-10-03 NOTE — ED PROVIDER NOTE - NS ED ROS FT
Constitutional: (-) fever (-) chills (-) (-) lightheadedness   Eyes/ENT: (-) blurry vision, (-) epistaxis (-) rhinorrhea (-) nasal congestion  Cardiovascular: (+) chest pain, (-) syncope (-) palpitations   Respiratory: (-) cough, (-) shortness of breath (-) pleurisy   Gastrointestinal: (-) vomiting, (-) diarrhea (+) abdominal pain (-) nausea (-) anorexia  Musculoskeletal: (-) neck pain, (-) back pain, (-) joint pain (-) joint swelling (-) painful ROM  Integumentary: (-) rash, (-) edema (-) lacerations (-) pruritis   Neurological: (-) headache, (-) altered mental status (-) LOC (-) dizziness (-) paresthesias (-) gait abnormalities

## 2021-10-03 NOTE — ED PROVIDER NOTE - PROGRESS NOTE DETAILS
dc: plan for EKG, CXR, labs, RUQ sono, CXr pepcid and meds. Will consult dr. de la rosa regarding previous CCTA.

## 2021-10-03 NOTE — ED ADULT NURSE NOTE - EXPLANATION OF PATIENT'S REASON FOR LEAVING
No longer wanted to stay because mother is unable to visit while on inpatient unit. Mother unvaccinated.

## 2021-10-03 NOTE — ED CDU PROVIDER INITIAL DAY NOTE - ATTENDING CONTRIBUTION TO CARE
36F PMH DM type 1, GERD, depression, p/w 3 months of intermittent substernal chest pain/heaviness, sob. associated. no fever, cough. no tearing bp. no trauma. nonsmoker. no le edema, le pain, immobilization, hormones, hemoptysis. lives at [facility] Resident RAFFI is a resident along with her parents. last stress 2018 was abnormal, unable to obtain CCTA after due to tachycardia. Cards- Dr Moya.    on exam, AFVSS, well wesly nad, ncat, eomi, perrla, mmm, lctab, rrr nl s1s2 no mrg, abd soft ntnd, aaox3, no focal deficits, no le edema or calf ttp,     a/p; CP, r/o ACS. PERC neg. will do labs, ekg/trop, cxr, tele asa, consult pts cardiologist re-eval. pt placed in EDOU for ACS r/o and provocative testing

## 2021-10-03 NOTE — ED ADULT NURSE NOTE - OBJECTIVE STATEMENT
Patient c/o midsternal chest pain x 3 months with increase in pain the last 3 days. Patient describes the pain as a constant tightness. Denies nausea/vomiting/fever/chills/SOB/diaphoresis. CM in place. No s/s of distress noted. Patient states she had +dizziness this morning.

## 2021-10-03 NOTE — ED CDU PROVIDER INITIAL DAY NOTE - PHYSICAL EXAMINATION
Physical Exam    Vital Signs: I have reviewed the initial vital signs.  Constitutional: well-nourished, appears stated age, no acute distress  Eyes: Conjunctiva pink, Sclera clear  Cardiovascular: S1 and S2, regular rate, regular rhythm, well-perfused extremities, radial pulses equal and 2+ b/l.   Respiratory: unlabored respiratory effort, clear to auscultation bilaterally no wheezing, rales and rhonchi. pt is speaking full sentences. no accessory muscle use. (+) mild reproducible anterior mid sternal chest tenderness  Gastrointestinal: soft, non-tender, nondistended abdomen, no pulsatile mass, normal bowl sounds, no rebound, no guarding, no organomegaly.   Musculoskeletal: supple neck, no lower extremity edema, no calf tenderness  Integumentary: warm, dry, no rash  Neurologic: awake, alert, cranial nerves II-XII grossly intact, extremities’ motor and sensory functions grossly intact.   Psychiatric: appropriate mood, appropriate affect

## 2021-10-03 NOTE — ED PROVIDER NOTE - PHYSICAL EXAMINATION
Physical Exam    Vital Signs: I have reviewed the initial vital signs.  Constitutional: well-nourished, appears stated age, no acute distress  Eyes: Conjunctiva pink, Sclera clear, PERRLA,  Cardiovascular: S1 and S2, regular rate, regular rhythm, well-perfused extremities, radial pulses equal and 2+, calves nonttp, equal in size  Respiratory: unlabored respiratory effort, speaking in full sentences, handling oral secretions, % on RA , on nasal cannula, clear to auscultation bilaterally no wheezing, rales and rhonchi  Gastrointestinal: soft, ttp RUQ, no CVAT b/l  Musculoskeletal: supple neck, no lower extremity edema,   Integumentary: warm, dry, no rashes, lacerations,  Neurologic: awake, alert x3

## 2021-10-03 NOTE — ED ADULT NURSE REASSESSMENT NOTE - NS ED NURSE REASSESS COMMENT FT1
pt received from previous RN. Cardiac monitoring ongoing. Resting comfortable in stretcher. Denies pain at this time. Will continue to monitor.

## 2021-10-03 NOTE — ED ADULT NURSE NOTE - NSSUHOSCREENINGYN_ED_ALL_ED
vomiting NBNB without diarrhea. abd soft nondistended and nontender. admin zofran and PO trial.  not concerned for acute abdomen. Yes - the patient is able to be screened

## 2021-10-03 NOTE — ED ADULT NURSE NOTE - CHIEF COMPLAINT QUOTE
Patient BIBA from [facility] for midsternal chest pain for 3 months worsening over last 3 days.

## 2021-10-03 NOTE — ED CDU PROVIDER INITIAL DAY NOTE - PROGRESS NOTE DETAILS
FF: pt repeat ekg shows qtc of 500, mag ordered mag level ordered received signout from Ishan Gordon - pt in obs for evaluation of chest pain - ce/ekg x 2 unremarkable for ischemia; previous ccta unsuccessful - as per pt's covering cardiologist Dr. ROBERTS - attempt ccta in am else pt to be admitted for cath;   covid neg;

## 2021-10-04 VITALS — RESPIRATION RATE: 18 BRPM | HEART RATE: 76 BPM | OXYGEN SATURATION: 100 %

## 2021-10-04 PROCEDURE — 99217: CPT

## 2021-10-04 RX ORDER — METOPROLOL TARTRATE 50 MG
100 TABLET ORAL ONCE
Refills: 0 | Status: COMPLETED | OUTPATIENT
Start: 2021-10-04 | End: 2021-10-04

## 2021-10-04 RX ORDER — SODIUM CHLORIDE 9 MG/ML
1000 INJECTION INTRAMUSCULAR; INTRAVENOUS; SUBCUTANEOUS ONCE
Refills: 0 | Status: COMPLETED | OUTPATIENT
Start: 2021-10-04 | End: 2021-10-04

## 2021-10-04 RX ADMIN — SODIUM CHLORIDE 1000 MILLILITER(S): 9 INJECTION INTRAMUSCULAR; INTRAVENOUS; SUBCUTANEOUS at 08:36

## 2021-10-04 RX ADMIN — Medication 100 MILLIGRAM(S): at 08:36

## 2021-10-04 RX ADMIN — Medication 100 MILLIGRAM(S): at 06:26

## 2021-10-04 NOTE — ED CDU PROVIDER DISPOSITION NOTE - PATIENT PORTAL LINK FT
You can access the FollowMyHealth Patient Portal offered by [facility] by registering at the following website: [email or web address]. By joining [employer]’s FollowMyHealth portal, you will also be able to view your health information using other applications (apps) compatible with our system.

## 2021-10-04 NOTE — ED CDU PROVIDER DISPOSITION NOTE - CARE PROVIDER_API CALL
Casey Christian  CARDIOVASCULAR DISEASE  [address]  [place] [address]  Phone: [phone]  Fax: [phone]  Follow Up Time: Urgent    Oskar John (DO)  Medicine  Physicians  [address] [place] [address]  Phone: [phone]  Fax: [phone]  Follow Up Time: Urgent

## 2021-10-04 NOTE — ED CDU PROVIDER SUBSEQUENT DAY NOTE - PROGRESS NOTE DETAILS
Patient comfortable, awaiting CCTA HR still 79 after meds. Spoke with Dr Sahu who recommends admission for cath tomorrow. Patient does not want to be admitted, will leave AMA. Aware of risks of severe illness including death.

## 2021-10-04 NOTE — ED CDU PROVIDER SUBSEQUENT DAY NOTE - ATTENDING CONTRIBUTION TO CARE
36-year-old female past medical history as stated in the chart presents with chest pain.  Patient has been undergoing cardiac work-up with her outpatient cardiologist for the past few months.  Patient still endorsing midsternal chest pain, discussed with cardiologist, plan for CCTA today if patient able to tolerate, if not plan for admission and potential cath.  No shortness of breath, no palpitations, no nausea/vomiting/diarrhea, no dizziness, no lightheadedness.    CONSTITUTIONAL: Well-developed; well-nourished; in no acute distress. Sitting up and providing appropriate history and physical examination  SKIN: skin exam is warm and dry, no acute rash.  HEAD: Normocephalic; atraumatic.  EYES: PERRL, 3 mm bilateral, no nystagmus, EOM intact; conjunctiva and sclera clear.  ENT: No nasal discharge; airway clear.  NECK: Supple; non tender. + full passive ROM in all directions. No JVD  CARD: S1, S2 normal; no murmurs, gallops, or rubs. Regular rate and rhythm. + Symmetric Strong Pulses  RESP: No wheezes, rales or rhonchi. Good air movement bilaterally  ABD: soft; non-distended; non-tender. No Rebound, No Guarding, No signs of peritonitis, No CVA tenderness. No pulsatile abdominal mass. + Strong and Symmetric Pulses  EXT: Normal ROM. No clubbing, cyanosis or edema. Dp and Pt Pulses intact. Cap refill less than 3 seconds  NEURO: CN 2-12 intact, normal finger to nose, normal romberg, stable gait, no sensory or motor deficits, Alert, oriented, grossly unremarkable. No Focal deficits. GCS 15. NIH 0  PSYCH: Cooperative, appropriate.

## 2021-10-04 NOTE — ED ADULT NURSE REASSESSMENT NOTE - NS ED NURSE REASSESS COMMENT FT1
Pt received AAo3, NAD, respirations even/unlabored. Cardiac monitor in place. Mother at bedside. Pending CCTA at this time.

## 2021-10-04 NOTE — ED CDU PROVIDER SUBSEQUENT DAY NOTE - HISTORY
pt resting in obs - ce/ekg x2 neg; covid neg; upreg neg; Hr 87 currently; will add metoprolol tart 100mg @6am;

## 2021-10-04 NOTE — ED CDU PROVIDER DISPOSITION NOTE - NSFOLLOWUPINSTRUCTIONS_ED_ALL_ED_FT
YOU ARE LEAVING AGAINST MEDICAL ADVICE. YOU ARE RISKING SEVERE ILLNESS INCLUDING DEATH. YOU MUST FOLLOW UP WITH YOUR DOCTOR WITHIN 24 HOURS. RETURN FOR WORSENING OF SYMPTOMS OR ANY OTHER CONCERNS.     Chest Pain    Chest pain can be caused by many different conditions which may or may not be dangerous. Causes include heartburn, lung infections, heart attack, blood clot in lungs, skin infections, strain or damage to muscle, cartilage, or bones, etc. In addition to a history and physical examination, an electrocardiogram (ECG) or other lab tests may have been performed to determine the cause of your chest pain. Follow up with your primary care provider or with a cardiologist as instructed.     SEEK IMMEDIATE MEDICAL CARE IF YOU HAVE ANY OF THE FOLLOWING SYMPTOMS: worsening chest pain, coughing up blood, unexplained back/neck/jaw pain, severe abdominal pain, dizziness or lightheadedness, fainting, shortness of breath, sweaty or clammy skin, vomiting, or racing heart beat. These symptoms may represent a serious problem that is an emergency. Do not wait to see if the symptoms will go away. Get medical help right away. Call 911 and do not drive yourself to the hospital.

## 2021-10-04 NOTE — ED CDU PROVIDER DISPOSITION NOTE - CLINICAL COURSE
Patient evaluated for chest pain.  Labs, EKG performed.  Cardiac enzymes and EKG negative x2.  Attempted to control heart rate and optimize for CCTA today, patient's heart rate unable to reach optimization.  Discussed with patient's cardiologist, recommending admission.  Discussed with mother and patient who stated that they did not want to stay in the hospital.  Mother states she will only allow her daughter to stay in the hospital if they are allowed to stay with her.  Patient and mother opted to leave AGAINST MEDICAL ADVICE. The patient wishes to leave against medical advice.  I have discussed the risks, benefits and alternatives (including the possibility of worsening of disease, pain, permanent disability, and/or death) with the patient and his/her family (if available).  The patient voices understanding of these risks, benefits, and alternatives and still wishes to sign out against medical advice.  The patient is awake, alert, oriented  x 3 and has demonstrated capacity to refuse/direct care.  I have advised the patient that they can and should return immediately should they develop any worse/different/additional symptoms, or if they change their mind and want to continue their care.

## 2021-10-04 NOTE — ED CDU PROVIDER DISPOSITION NOTE - PROVIDER TOKENS
PROVIDER:[TOKEN:[[id number]],FOLLOWUP:[Urgent]],PROVIDER:[TOKEN:[[id number]],FOLLOWUP:[Urgent]]

## 2021-10-04 NOTE — CONSULT NOTE ADULT - ASSESSMENT
Patient with DM and long Hx of chest pain, abnormal Adenosine nuclear stress test, unable to treadmill nuclear stress test or CCTA  Resume her medications  ASA 81 mg daily  DT prophylaxis   keep NPO after midnight for the cardiac Cath tomorrow by her cardiologist Dr Christian

## 2021-10-04 NOTE — ED CDU PROVIDER SUBSEQUENT DAY NOTE - MEDICAL DECISION MAKING DETAILS
Patient evaluated for chest pain.  Labs, EKG, chest x-ray performed during ED stay, placed in observation for further evaluation and treatment.  Cardiac enzymes and EKG negative for ischemic changes x2.  Plan to give metoprolol and continue heart rate control for CCTA today, will continue to monitor.

## 2021-10-07 ENCOUNTER — NON-APPOINTMENT (OUTPATIENT)
Age: 36
End: 2021-10-07

## 2021-10-08 ENCOUNTER — NON-APPOINTMENT (OUTPATIENT)
Age: 36
End: 2021-10-08

## 2021-10-12 ENCOUNTER — APPOINTMENT (OUTPATIENT)
Dept: ENDOCRINOLOGY | Facility: CLINIC | Age: 36
End: 2021-10-12
Payer: MEDICAID

## 2021-10-12 VITALS
HEART RATE: 109 BPM | TEMPERATURE: 97.3 F | SYSTOLIC BLOOD PRESSURE: 138 MMHG | BODY MASS INDEX: 30.39 KG/M2 | OXYGEN SATURATION: 97 % | WEIGHT: 178 LBS | HEIGHT: 64 IN | DIASTOLIC BLOOD PRESSURE: 88 MMHG

## 2021-10-12 PROCEDURE — 99214 OFFICE O/P EST MOD 30 MIN: CPT

## 2021-10-12 NOTE — REVIEW OF SYSTEMS
[Fatigue] : no fatigue [Decreased Appetite] : appetite not decreased [Recent Weight Gain (___ Lbs)] : no recent weight gain [Recent Weight Loss (___ Lbs)] : no recent weight loss [Visual Field Defect] : no visual field defect [Blurred Vision] : no blurred vision [Dysphagia] : no dysphagia [Dysphonia] : no dysphonia [Palpitations] : palpitations [Shortness Of Breath] : no shortness of breath [Cough] : no cough [Orthopnea] : no orthopnea [PND] : no Paroxysmal Nocturnal Dyspnea [Nausea] : no nausea [Constipation] : no constipation [Vomiting] : no vomiting [Diarrhea] : no diarrhea [Nocturia] : no nocturia [Acanthosis] : no acanthosis  [Acne] : no acne [Dry Skin] : no dry skin [Hirsutism] : no hirsutism [Headaches] : no headaches [Tremors] : no tremors [Cold Intolerance] : no cold intolerance [Heat Intolerance] : no heat intolerance

## 2021-10-12 NOTE — DATA REVIEWED
[FreeTextEntry1] : previous data and record reviewed \par 1/15/21:  hba1c 8.7% glucose 203  LDL 116crea 0.7  AST 15  ALT 18  hb 13.6 \par 6/1/21: Hba1c 8.9% glucose 283  crea0.62  \par 10/4/21: HBA1c 10.8% glucose 190   TSH 1.310  ft4 1.22 ALT 56

## 2021-10-12 NOTE — ASSESSMENT
[Diabetes Foot Care] : diabetes foot care [Long Term Vascular Complications] : long term vascular complications of diabetes [Carbohydrate Consistent Diet] : carbohydrate consistent diet [Importance of Diet and Exercise] : importance of diet and exercise to improve glycemic control, achieve weight loss and improve cardiovascular health [Exercise/Effect on Glucose] : exercise/effect on glucose [Hypoglycemia Management] : hypoglycemia management [Action and use of Insulin] : action and use of short and long-acting insulin [Self Monitoring of Blood Glucose] : self monitoring of blood glucose [Insulin Self-Administration] : insulin self-administration [Injection Technique, Storage, Sharps Disposal] : injection technique, storage, and sharps disposal [FreeTextEntry1] : 35 year old patient with type 1 DM on insulin pump presents for evaluation ( new to me ) \par \par - log reviewed and pattern noted mostly when she had anxiety attack , and few bedtime hyperglycemia due to snacking \par - basal rate not changed for now \par 12am-6am : 1.4 from 1.2 \par 6am -12pm : 1 u/hr from 0.8 \par 12pm-12am 1.6 from 1.4 u/hr \par - A1c not correlating with FS checks , she will notify me if numbers in 200 to adjust further \par - advised not to correct within 2 hours from taking insulin to avoid Stalking and hypoglycemia \par - uptodate with eye exam , has cataract \par - patient not interested in CGM because of inability to be placed on her arms /legs , very happy with her pump \par  - follow up in 3 months \par - continue vitamin D \par

## 2021-10-14 RX ORDER — URINE ACETONE TEST STRIPS
STRIP MISCELLANEOUS
Qty: 50 | Refills: 5 | Status: ACTIVE | COMMUNITY
Start: 2020-02-14 | End: 1900-01-01

## 2021-11-04 ENCOUNTER — EMERGENCY (EMERGENCY)
Facility: HOSPITAL | Age: 36
LOS: 0 days | Discharge: NURSING, REHAB OR LONG-TERM CARE | End: 2021-11-04
Attending: EMERGENCY MEDICINE | Admitting: EMERGENCY MEDICINE
Payer: MEDICAID

## 2021-11-04 VITALS
SYSTOLIC BLOOD PRESSURE: 122 MMHG | HEART RATE: 166 BPM | TEMPERATURE: 98 F | OXYGEN SATURATION: 100 % | DIASTOLIC BLOOD PRESSURE: 70 MMHG | RESPIRATION RATE: 18 BRPM

## 2021-11-04 VITALS — WEIGHT: 175.05 LBS | HEIGHT: 64 IN

## 2021-11-04 DIAGNOSIS — M48.00 SPINAL STENOSIS, SITE UNSPECIFIED: ICD-10-CM

## 2021-11-04 DIAGNOSIS — F32.A DEPRESSION, UNSPECIFIED: ICD-10-CM

## 2021-11-04 DIAGNOSIS — K21.9 GASTRO-ESOPHAGEAL REFLUX DISEASE WITHOUT ESOPHAGITIS: ICD-10-CM

## 2021-11-04 DIAGNOSIS — Z88.1 ALLERGY STATUS TO OTHER ANTIBIOTIC AGENTS STATUS: ICD-10-CM

## 2021-11-04 DIAGNOSIS — M51.34 OTHER INTERVERTEBRAL DISC DEGENERATION, THORACIC REGION: ICD-10-CM

## 2021-11-04 DIAGNOSIS — Z88.0 ALLERGY STATUS TO PENICILLIN: ICD-10-CM

## 2021-11-04 DIAGNOSIS — E11.9 TYPE 2 DIABETES MELLITUS WITHOUT COMPLICATIONS: ICD-10-CM

## 2021-11-04 DIAGNOSIS — R07.9 CHEST PAIN, UNSPECIFIED: ICD-10-CM

## 2021-11-04 DIAGNOSIS — R42 DIZZINESS AND GIDDINESS: ICD-10-CM

## 2021-11-04 DIAGNOSIS — R53.1 WEAKNESS: ICD-10-CM

## 2021-11-04 DIAGNOSIS — Z88.7 ALLERGY STATUS TO SERUM AND VACCINE: ICD-10-CM

## 2021-11-04 DIAGNOSIS — Z88.8 ALLERGY STATUS TO OTHER DRUGS, MEDICAMENTS AND BIOLOGICAL SUBSTANCES STATUS: ICD-10-CM

## 2021-11-04 DIAGNOSIS — R10.9 UNSPECIFIED ABDOMINAL PAIN: ICD-10-CM

## 2021-11-04 LAB
ALBUMIN SERPL ELPH-MCNC: 4.3 G/DL — SIGNIFICANT CHANGE UP (ref 3.5–5.2)
ALP SERPL-CCNC: 100 U/L — SIGNIFICANT CHANGE UP (ref 30–115)
ALT FLD-CCNC: 34 U/L — SIGNIFICANT CHANGE UP (ref 0–41)
ANION GAP SERPL CALC-SCNC: 9 MMOL/L — SIGNIFICANT CHANGE UP (ref 7–14)
AST SERPL-CCNC: 19 U/L — SIGNIFICANT CHANGE UP (ref 0–41)
BASOPHILS # BLD AUTO: 0.04 K/UL — SIGNIFICANT CHANGE UP (ref 0–0.2)
BASOPHILS NFR BLD AUTO: 0.4 % — SIGNIFICANT CHANGE UP (ref 0–1)
BILIRUB SERPL-MCNC: 0.2 MG/DL — SIGNIFICANT CHANGE UP (ref 0.2–1.2)
BUN SERPL-MCNC: 6 MG/DL — LOW (ref 10–20)
CALCIUM SERPL-MCNC: 9.5 MG/DL — SIGNIFICANT CHANGE UP (ref 8.5–10.1)
CHLORIDE SERPL-SCNC: 100 MMOL/L — SIGNIFICANT CHANGE UP (ref 98–110)
CO2 SERPL-SCNC: 27 MMOL/L — SIGNIFICANT CHANGE UP (ref 17–32)
CREAT SERPL-MCNC: 0.6 MG/DL — LOW (ref 0.7–1.5)
EOSINOPHIL # BLD AUTO: 0.01 K/UL — SIGNIFICANT CHANGE UP (ref 0–0.7)
EOSINOPHIL NFR BLD AUTO: 0.1 % — SIGNIFICANT CHANGE UP (ref 0–8)
GLUCOSE SERPL-MCNC: 192 MG/DL — HIGH (ref 70–99)
HCG SERPL QL: NEGATIVE — SIGNIFICANT CHANGE UP
HCT VFR BLD CALC: 36.4 % — LOW (ref 37–47)
HGB BLD-MCNC: 11.7 G/DL — LOW (ref 12–16)
IMM GRANULOCYTES NFR BLD AUTO: 0.4 % — HIGH (ref 0.1–0.3)
LIDOCAIN IGE QN: 13 U/L — SIGNIFICANT CHANGE UP (ref 7–60)
LYMPHOCYTES # BLD AUTO: 1.68 K/UL — SIGNIFICANT CHANGE UP (ref 1.2–3.4)
LYMPHOCYTES # BLD AUTO: 14.8 % — LOW (ref 20.5–51.1)
MAGNESIUM SERPL-MCNC: 1.9 MG/DL — SIGNIFICANT CHANGE UP (ref 1.8–2.4)
MCHC RBC-ENTMCNC: 28 PG — SIGNIFICANT CHANGE UP (ref 27–31)
MCHC RBC-ENTMCNC: 32.1 G/DL — SIGNIFICANT CHANGE UP (ref 32–37)
MCV RBC AUTO: 87.1 FL — SIGNIFICANT CHANGE UP (ref 81–99)
MONOCYTES # BLD AUTO: 0.5 K/UL — SIGNIFICANT CHANGE UP (ref 0.1–0.6)
MONOCYTES NFR BLD AUTO: 4.4 % — SIGNIFICANT CHANGE UP (ref 1.7–9.3)
NEUTROPHILS # BLD AUTO: 9.09 K/UL — HIGH (ref 1.4–6.5)
NEUTROPHILS NFR BLD AUTO: 79.9 % — HIGH (ref 42.2–75.2)
NRBC # BLD: 0 /100 WBCS — SIGNIFICANT CHANGE UP (ref 0–0)
PLATELET # BLD AUTO: 432 K/UL — HIGH (ref 130–400)
POTASSIUM SERPL-MCNC: 4.2 MMOL/L — SIGNIFICANT CHANGE UP (ref 3.5–5)
POTASSIUM SERPL-SCNC: 4.2 MMOL/L — SIGNIFICANT CHANGE UP (ref 3.5–5)
PROT SERPL-MCNC: 7.5 G/DL — SIGNIFICANT CHANGE UP (ref 6–8)
RBC # BLD: 4.18 M/UL — LOW (ref 4.2–5.4)
RBC # FLD: 13.6 % — SIGNIFICANT CHANGE UP (ref 11.5–14.5)
SODIUM SERPL-SCNC: 136 MMOL/L — SIGNIFICANT CHANGE UP (ref 135–146)
TROPONIN T SERPL-MCNC: <0.01 NG/ML — SIGNIFICANT CHANGE UP
WBC # BLD: 11.36 K/UL — HIGH (ref 4.8–10.8)
WBC # FLD AUTO: 11.36 K/UL — HIGH (ref 4.8–10.8)

## 2021-11-04 PROCEDURE — 71046 X-RAY EXAM CHEST 2 VIEWS: CPT | Mod: 26

## 2021-11-04 PROCEDURE — 99285 EMERGENCY DEPT VISIT HI MDM: CPT

## 2021-11-04 PROCEDURE — 93010 ELECTROCARDIOGRAM REPORT: CPT

## 2021-11-04 RX ORDER — SODIUM CHLORIDE 9 MG/ML
1000 INJECTION INTRAMUSCULAR; INTRAVENOUS; SUBCUTANEOUS ONCE
Refills: 0 | Status: COMPLETED | OUTPATIENT
Start: 2021-11-04 | End: 2021-11-04

## 2021-11-04 RX ORDER — ONDANSETRON 8 MG/1
8 TABLET, FILM COATED ORAL ONCE
Refills: 0 | Status: DISCONTINUED | OUTPATIENT
Start: 2021-11-04 | End: 2021-11-04

## 2021-11-04 RX ORDER — INDOMETHACIN 50 MG
0 CAPSULE ORAL
Qty: 0 | Refills: 0 | DISCHARGE

## 2021-11-04 RX ORDER — INSULIN LISPRO 100/ML
0 VIAL (ML) SUBCUTANEOUS
Qty: 0 | Refills: 0 | DISCHARGE

## 2021-11-04 RX ORDER — FLUTICASONE PROPIONATE 50 MCG
1 SPRAY, SUSPENSION NASAL
Qty: 0 | Refills: 0 | DISCHARGE

## 2021-11-04 RX ORDER — METOPROLOL TARTRATE 50 MG
1 TABLET ORAL
Qty: 0 | Refills: 0 | DISCHARGE

## 2021-11-04 RX ORDER — FAMOTIDINE 10 MG/ML
20 INJECTION INTRAVENOUS ONCE
Refills: 0 | Status: COMPLETED | OUTPATIENT
Start: 2021-11-04 | End: 2021-11-04

## 2021-11-04 RX ORDER — ONDANSETRON 8 MG/1
4 TABLET, FILM COATED ORAL ONCE
Refills: 0 | Status: COMPLETED | OUTPATIENT
Start: 2021-11-04 | End: 2021-11-04

## 2021-11-04 RX ADMIN — SODIUM CHLORIDE 1000 MILLILITER(S): 9 INJECTION INTRAMUSCULAR; INTRAVENOUS; SUBCUTANEOUS at 12:10

## 2021-11-04 RX ADMIN — FAMOTIDINE 104 MILLIGRAM(S): 10 INJECTION INTRAVENOUS at 13:42

## 2021-11-04 RX ADMIN — SODIUM CHLORIDE 1000 MILLILITER(S): 9 INJECTION INTRAMUSCULAR; INTRAVENOUS; SUBCUTANEOUS at 15:05

## 2021-11-04 RX ADMIN — ONDANSETRON 4 MILLIGRAM(S): 8 TABLET, FILM COATED ORAL at 13:42

## 2021-11-04 RX ADMIN — FAMOTIDINE 20 MILLIGRAM(S): 10 INJECTION INTRAVENOUS at 15:05

## 2021-11-04 NOTE — ED PROVIDER NOTE - CLINICAL SUMMARY MEDICAL DECISION MAKING FREE TEXT BOX
persisting sx for 1 month currently being w/u in by cardiology.  appears well.  In my opinion, out patient treatment and follow up are appropriate.

## 2021-11-04 NOTE — ED PROVIDER NOTE - OBJECTIVE STATEMENT
37 y/o female with hx of IDDM, GERD, depression, tachycardia presents to the ED with lightheadedness upon standing x 1 month. patient also c/o persistant abdominal cramping and chest pain. patient denies any sob.  No associated fever, cough, no trauma, nonsmoker, no leg edema, hemoptysis. lives at [facility] Last stress 2018 was abnormal, unable to obtain CCTA after due to tachycardia. Patient just had a 24 hr hour holter monitor by dr. deleon . patient is to have stress echo upcoming. patient treated in the ED multiple times for similar.

## 2021-11-04 NOTE — ED PROVIDER NOTE - PHYSICAL EXAMINATION
Vital Signs: I have reviewed the initial vital signs.  Constitutional: well-nourished, no acute distress, normocephalic  Eyes: PERRLA, EOMI, no nystagmus, clear conjunctiva  ENT: MMM,  Cardiovascular: regular rate, regular rhythm, no murmur appreciated  Respiratory: unlabored respiratory effort, clear to auscultation bilaterally  Gastrointestinal: soft, non-tender, non-distended  abdomen, no pulsatile mass  Musculoskeletal: supple neck, no calf tenderness or swelling, no bony tenderness  Integumentary: warm, dry, no rash  Neurologic: awake, alert, cranial nerves II-XII grossly intact, extremities’ motor and sensory functions grossly intact, no focal deficits,  Psychiatric: appropriate mood, appropriate affect

## 2021-11-04 NOTE — ED PROVIDER NOTE - PATIENT PORTAL LINK FT
You can access the FollowMyHealth Patient Portal offered by [facility] by registering at the following website: [email or web address]. By joining [employer]’s FollowMyHealth portal, you will also be able to view your health information using other applications (apps) compatible with our system.

## 2021-11-04 NOTE — ED PROVIDER NOTE - CARE PROVIDER_API CALL
Casey Christian  CARDIOVASCULAR DISEASE  [address]  [place] [address]  Phone: [phone]  Fax: [phone]  Follow Up Time:

## 2021-11-04 NOTE — ED PROVIDER NOTE - ATTENDING CONTRIBUTION TO CARE
lightheaded for 1 month.  no cp/sob.  VS noted.  Chest clear.  Heart RR no murmur.  abd NT.  Ext FROM.  =pulses.   dx testing reviewed.

## 2021-11-06 ENCOUNTER — EMERGENCY (EMERGENCY)
Facility: HOSPITAL | Age: 36
LOS: 0 days | Discharge: HOME | End: 2021-11-06
Attending: EMERGENCY MEDICINE | Admitting: EMERGENCY MEDICINE
Payer: MEDICAID

## 2021-11-06 VITALS
OXYGEN SATURATION: 100 % | HEIGHT: 64 IN | WEIGHT: 175.05 LBS | HEART RATE: 100 BPM | TEMPERATURE: 97 F | SYSTOLIC BLOOD PRESSURE: 131 MMHG | RESPIRATION RATE: 20 BRPM | DIASTOLIC BLOOD PRESSURE: 74 MMHG

## 2021-11-06 DIAGNOSIS — F32.9 MAJOR DEPRESSIVE DISORDER, SINGLE EPISODE, UNSPECIFIED: ICD-10-CM

## 2021-11-06 DIAGNOSIS — R10.9 UNSPECIFIED ABDOMINAL PAIN: ICD-10-CM

## 2021-11-06 DIAGNOSIS — Z96.41 PRESENCE OF INSULIN PUMP (EXTERNAL) (INTERNAL): ICD-10-CM

## 2021-11-06 DIAGNOSIS — R11.0 NAUSEA: ICD-10-CM

## 2021-11-06 DIAGNOSIS — Z88.7 ALLERGY STATUS TO SERUM AND VACCINE: ICD-10-CM

## 2021-11-06 DIAGNOSIS — E10.9 TYPE 1 DIABETES MELLITUS WITHOUT COMPLICATIONS: ICD-10-CM

## 2021-11-06 DIAGNOSIS — Z88.1 ALLERGY STATUS TO OTHER ANTIBIOTIC AGENTS STATUS: ICD-10-CM

## 2021-11-06 DIAGNOSIS — F41.9 ANXIETY DISORDER, UNSPECIFIED: ICD-10-CM

## 2021-11-06 DIAGNOSIS — M48.00 SPINAL STENOSIS, SITE UNSPECIFIED: ICD-10-CM

## 2021-11-06 DIAGNOSIS — R07.89 OTHER CHEST PAIN: ICD-10-CM

## 2021-11-06 DIAGNOSIS — G62.9 POLYNEUROPATHY, UNSPECIFIED: ICD-10-CM

## 2021-11-06 DIAGNOSIS — K21.9 GASTRO-ESOPHAGEAL REFLUX DISEASE WITHOUT ESOPHAGITIS: ICD-10-CM

## 2021-11-06 DIAGNOSIS — Z88.8 ALLERGY STATUS TO OTHER DRUGS, MEDICAMENTS AND BIOLOGICAL SUBSTANCES: ICD-10-CM

## 2021-11-06 DIAGNOSIS — R10.13 EPIGASTRIC PAIN: ICD-10-CM

## 2021-11-06 DIAGNOSIS — R00.2 PALPITATIONS: ICD-10-CM

## 2021-11-06 DIAGNOSIS — M51.34 OTHER INTERVERTEBRAL DISC DEGENERATION, THORACIC REGION: ICD-10-CM

## 2021-11-06 DIAGNOSIS — K59.00 CONSTIPATION, UNSPECIFIED: ICD-10-CM

## 2021-11-06 DIAGNOSIS — Z88.0 ALLERGY STATUS TO PENICILLIN: ICD-10-CM

## 2021-11-06 LAB
ALBUMIN SERPL ELPH-MCNC: 4.1 G/DL — SIGNIFICANT CHANGE UP (ref 3.5–5.2)
ALP SERPL-CCNC: 103 U/L — SIGNIFICANT CHANGE UP (ref 30–115)
ALT FLD-CCNC: 37 U/L — SIGNIFICANT CHANGE UP (ref 0–41)
ANION GAP SERPL CALC-SCNC: 12 MMOL/L — SIGNIFICANT CHANGE UP (ref 7–14)
APPEARANCE UR: CLEAR — SIGNIFICANT CHANGE UP
AST SERPL-CCNC: 23 U/L — SIGNIFICANT CHANGE UP (ref 0–41)
BASOPHILS # BLD AUTO: 0.04 K/UL — SIGNIFICANT CHANGE UP (ref 0–0.2)
BASOPHILS NFR BLD AUTO: 0.3 % — SIGNIFICANT CHANGE UP (ref 0–1)
BILIRUB SERPL-MCNC: <0.2 MG/DL — SIGNIFICANT CHANGE UP (ref 0.2–1.2)
BILIRUB UR-MCNC: NEGATIVE — SIGNIFICANT CHANGE UP
BUN SERPL-MCNC: 10 MG/DL — SIGNIFICANT CHANGE UP (ref 10–20)
CALCIUM SERPL-MCNC: 9.5 MG/DL — SIGNIFICANT CHANGE UP (ref 8.5–10.1)
CHLORIDE SERPL-SCNC: 100 MMOL/L — SIGNIFICANT CHANGE UP (ref 98–110)
CO2 SERPL-SCNC: 24 MMOL/L — SIGNIFICANT CHANGE UP (ref 17–32)
COLOR SPEC: YELLOW — SIGNIFICANT CHANGE UP
CREAT SERPL-MCNC: 0.6 MG/DL — LOW (ref 0.7–1.5)
DIFF PNL FLD: NEGATIVE — SIGNIFICANT CHANGE UP
EOSINOPHIL # BLD AUTO: 0.01 K/UL — SIGNIFICANT CHANGE UP (ref 0–0.7)
EOSINOPHIL NFR BLD AUTO: 0.1 % — SIGNIFICANT CHANGE UP (ref 0–8)
GLUCOSE SERPL-MCNC: 282 MG/DL — HIGH (ref 70–99)
GLUCOSE UR QL: >=1000 MG/DL
HCT VFR BLD CALC: 35.5 % — LOW (ref 37–47)
HGB BLD-MCNC: 11.3 G/DL — LOW (ref 12–16)
IMM GRANULOCYTES NFR BLD AUTO: 0.5 % — HIGH (ref 0.1–0.3)
KETONES UR-MCNC: ABNORMAL
LEUKOCYTE ESTERASE UR-ACNC: NEGATIVE — SIGNIFICANT CHANGE UP
LIDOCAIN IGE QN: 13 U/L — SIGNIFICANT CHANGE UP (ref 7–60)
LYMPHOCYTES # BLD AUTO: 1.68 K/UL — SIGNIFICANT CHANGE UP (ref 1.2–3.4)
LYMPHOCYTES # BLD AUTO: 13.9 % — LOW (ref 20.5–51.1)
MAGNESIUM SERPL-MCNC: 2 MG/DL — SIGNIFICANT CHANGE UP (ref 1.8–2.4)
MCHC RBC-ENTMCNC: 27.6 PG — SIGNIFICANT CHANGE UP (ref 27–31)
MCHC RBC-ENTMCNC: 31.8 G/DL — LOW (ref 32–37)
MCV RBC AUTO: 86.8 FL — SIGNIFICANT CHANGE UP (ref 81–99)
MONOCYTES # BLD AUTO: 0.53 K/UL — SIGNIFICANT CHANGE UP (ref 0.1–0.6)
MONOCYTES NFR BLD AUTO: 4.4 % — SIGNIFICANT CHANGE UP (ref 1.7–9.3)
NEUTROPHILS # BLD AUTO: 9.75 K/UL — HIGH (ref 1.4–6.5)
NEUTROPHILS NFR BLD AUTO: 80.8 % — HIGH (ref 42.2–75.2)
NITRITE UR-MCNC: NEGATIVE — SIGNIFICANT CHANGE UP
NRBC # BLD: 0 /100 WBCS — SIGNIFICANT CHANGE UP (ref 0–0)
PH UR: 6.5 — SIGNIFICANT CHANGE UP (ref 5–8)
PLATELET # BLD AUTO: 427 K/UL — HIGH (ref 130–400)
POTASSIUM SERPL-MCNC: 4.8 MMOL/L — SIGNIFICANT CHANGE UP (ref 3.5–5)
POTASSIUM SERPL-SCNC: 4.8 MMOL/L — SIGNIFICANT CHANGE UP (ref 3.5–5)
PROT SERPL-MCNC: 7.1 G/DL — SIGNIFICANT CHANGE UP (ref 6–8)
PROT UR-MCNC: NEGATIVE MG/DL — SIGNIFICANT CHANGE UP
RBC # BLD: 4.09 M/UL — LOW (ref 4.2–5.4)
RBC # FLD: 13.8 % — SIGNIFICANT CHANGE UP (ref 11.5–14.5)
SODIUM SERPL-SCNC: 136 MMOL/L — SIGNIFICANT CHANGE UP (ref 135–146)
SP GR SPEC: 1.02 — SIGNIFICANT CHANGE UP (ref 1.01–1.03)
TROPONIN T SERPL-MCNC: <0.01 NG/ML — SIGNIFICANT CHANGE UP
UROBILINOGEN FLD QL: 0.2 MG/DL — SIGNIFICANT CHANGE UP
WBC # BLD: 12.07 K/UL — HIGH (ref 4.8–10.8)
WBC # FLD AUTO: 12.07 K/UL — HIGH (ref 4.8–10.8)

## 2021-11-06 PROCEDURE — 74177 CT ABD & PELVIS W/CONTRAST: CPT | Mod: 26,MA

## 2021-11-06 PROCEDURE — 99285 EMERGENCY DEPT VISIT HI MDM: CPT

## 2021-11-06 PROCEDURE — 93010 ELECTROCARDIOGRAM REPORT: CPT

## 2021-11-06 RX ORDER — ACETAMINOPHEN 500 MG
975 TABLET ORAL ONCE
Refills: 0 | Status: COMPLETED | OUTPATIENT
Start: 2021-11-06 | End: 2021-11-06

## 2021-11-06 RX ORDER — ASPIRIN/CALCIUM CARB/MAGNESIUM 324 MG
324 TABLET ORAL ONCE
Refills: 0 | Status: COMPLETED | OUTPATIENT
Start: 2021-11-06 | End: 2021-11-06

## 2021-11-06 RX ORDER — ASPIRIN/CALCIUM CARB/MAGNESIUM 324 MG
1 TABLET ORAL
Qty: 30 | Refills: 0
Start: 2021-11-06 | End: 2021-12-05

## 2021-11-06 RX ADMIN — Medication 324 MILLIGRAM(S): at 16:11

## 2021-11-06 RX ADMIN — Medication 975 MILLIGRAM(S): at 12:49

## 2021-11-06 NOTE — ED PROVIDER NOTE - NS ED ROS FT
Review of Systems    Constitutional: (-) fever/ chills (-)loss of appetite   Eyes (-) visual changes  ENT: (-) epistaxis (-) sore throat (-) ear pain  Cardiovascular: (-) chest pain, (-) syncope (-) palpitations  Respiratory: (-) cough, (-) shortness of breath  Gastrointestinal: (-) vomiting, (-) diarrhea (+) abdominal pain  : (-) dysuria , hematuria   Musculoskeletal:  (-) back pain, (-) joint pain   Integumentary: (-) rash, (-) swelling  Neurological: (-) headache, (-) altered mental status

## 2021-11-06 NOTE — ED PROVIDER NOTE - NSFOLLOWUPCLINICS_GEN_ALL_ED_FT
[facility]  [facility]  [address] [place] [address]  Phone: [phone]  Fax:

## 2021-11-06 NOTE — ED PROVIDER NOTE - OBJECTIVE STATEMENT
35 y/o female with hx of anxiety, diabetes, tachycardia presents to the Ed with abdominal pain and constipation . patient c/o crampy pain. patient under increased stress . patient denies any vomiting  or fevers. patient had virtural appt with GI . patient not worse with movement or after eating . no dysuria or back pain. patient seen in the Ed two days earlier for chest pain and nausea. no black or bloody stool. patient states small amount of brown hard stools

## 2021-11-06 NOTE — ED PROVIDER NOTE - CARE PROVIDER_API CALL
Jaime Tracy (MD)  Gastroenterology  [address] [place] [address]  Phone: [phone]  Fax: [phone]  Follow Up Time:

## 2021-11-06 NOTE — ED PROVIDER NOTE - ATTENDING CONTRIBUTION TO CARE
Pt has a history of diabetes, IDDM on insulin pump. Hx of chest pain x 4 years. Under the care of Dr. Dhillon who has done Nuclear stress, Stress Echo and last month attempted to do a cardiac angiogram that the patient could not tolerate due to anxiety. Pt today reports palpitations, some mild chest pain, abdominal pain and felt slightly short of breath. Occas dose feel nausea. No diaphoresis. Pain dose not radiate. Pt states the type of pain and intensity is the same as usual. On exam S1S2 rrr, lungs clear, abdomen is soft nontender, ext neg for edema. There is tenderness to the left upper chest.

## 2021-11-06 NOTE — ED PROVIDER NOTE - PATIENT PORTAL LINK FT
You can access the FollowMyHealth Patient Portal offered by [facility] by registering at the following website: [email or web address]. By joining [employer]’s FollowMyHealth portal, you will also be able to view your health information using other applications (apps) compatible with our system.

## 2021-11-06 NOTE — ED PROVIDER NOTE - PHYSICAL EXAMINATION
Vital Signs: I have reviewed the initial vital signs.  Constitutional: well-nourished, no acute distress, normocephalic  Eyes: PERRLA, EOMI, , clear conjunctiva  ENT: MMM,   Cardiovascular: regular rate, regular rhythm, no murmur appreciated  Respiratory: unlabored respiratory effort, clear to auscultation bilaterally  Gastrointestinal: soft, non-tender, non-distended  abdomen, no pulsatile mass  Musculoskeletal: supple neck, no lower extremity edema, no bony tenderness  Integumentary: warm, dry, no rash  Neurologic: awake, alert, cranial nerves II-XII grossly intact, extremities’ motor and sensory functions grossly intact, no focal deficits

## 2021-11-06 NOTE — ED ADULT NURSE NOTE - NSIMPLEMENTINTERV_GEN_ALL_ED
Implemented All Universal Safety Interventions:  [place] to call system. Call bell, personal items and telephone within reach. Instruct patient to call for assistance. Room bathroom lighting operational. Non-slip footwear when patient is off stretcher. Physically safe environment: no spills, clutter or unnecessary equipment. Stretcher in lowest position, wheels locked, appropriate side rails in place.

## 2021-11-06 NOTE — ED PROVIDER NOTE - CLINICAL SUMMARY MEDICAL DECISION MAKING FREE TEXT BOX
Abdominal pain>>CT scan is normal. Chest pains>>Troponin neg. Pt has follow up with cardiology in a few weeks. Pt advised to start aspirin daily for now.

## 2021-11-06 NOTE — ED ADULT NURSE NOTE - EXTENSIONS OF SELF_ADULT
"Chief Complaint   Patient presents with     Ear Problem       Initial Pulse 118  Temp 98.8  F (37.1  C) (Tympanic)  Resp 20  Ht 2' 8.68\" (0.83 m)  Wt 28 lb (12.7 kg)  SpO2 100%  BMI 18.44 kg/m2 Estimated body mass index is 18.44 kg/(m^2) as calculated from the following:    Height as of this encounter: 2' 8.68\" (0.83 m).    Weight as of this encounter: 28 lb (12.7 kg).  Medication Reconciliation: complete  " None

## 2021-11-06 NOTE — ED PROVIDER NOTE - NSFOLLOWUPINSTRUCTIONS_ED_ALL_ED_FT
Abdominal Pain    Many things can cause abdominal pain. Usually, abdominal pain is not caused by a disease and will improve without treatment. Your health care provider will do a physical exam and possibly order blood tests and imaging to help determine the seriousness of your pain. However, in many cases, no cause may be found and you may need further testing as an outpatient. Monitor your abdominal pain for any changes.     SEEK IMMEDIATE MEDICAL CARE IF YOU HAVE THE FOLLOWING SYMPTOMS: worsening abdominal pain, vomiting, diarrhea, inability to have bowel movements or pass gas, black or bloody stool, fever accompanying chest pain or back pain, or dizziness/lightheadedness.    Gastritis    Gastritis is soreness and swelling (inflammation) of the lining of the stomach. Gastritis can develop as a sudden onset (acute) or long-term (chronic) condition. If gastritis is not treated, it can lead to stomach bleeding and ulcers. Causes include viral and bacterial infections, excessive alcohol consumption, tobacco use, or certain medications. Symptoms include abdominal pain or burning especially after eating, nausea, vomiting. Avoid foods or drinks that make your symptoms worse such as caffeine, chocolate, spicy foods, acidic foods, alcohol.    SEEK IMMEDIATE MEDICAL CARE IF YOU HAVE THE FOLLOWING SYMPTOMS: black or bloody stools, blood or coffee-ground-colored vomitus, worsening abdominal pain, fever, or inability to keep fluids down.

## 2021-11-13 ENCOUNTER — EMERGENCY (EMERGENCY)
Facility: HOSPITAL | Age: 36
LOS: 0 days | Discharge: HOME | End: 2021-11-13
Attending: STUDENT IN AN ORGANIZED HEALTH CARE EDUCATION/TRAINING PROGRAM | Admitting: STUDENT IN AN ORGANIZED HEALTH CARE EDUCATION/TRAINING PROGRAM
Payer: MEDICAID

## 2021-11-13 VITALS — RESPIRATION RATE: 18 BRPM | HEART RATE: 89 BPM | OXYGEN SATURATION: 98 %

## 2021-11-13 VITALS
SYSTOLIC BLOOD PRESSURE: 133 MMHG | HEART RATE: 104 BPM | RESPIRATION RATE: 19 BRPM | TEMPERATURE: 98 F | OXYGEN SATURATION: 98 % | DIASTOLIC BLOOD PRESSURE: 80 MMHG

## 2021-11-13 DIAGNOSIS — R09.89 OTHER SPECIFIED SYMPTOMS AND SIGNS INVOLVING THE CIRCULATORY AND RESPIRATORY SYSTEMS: ICD-10-CM

## 2021-11-13 DIAGNOSIS — F32.9 MAJOR DEPRESSIVE DISORDER, SINGLE EPISODE, UNSPECIFIED: ICD-10-CM

## 2021-11-13 DIAGNOSIS — E10.9 TYPE 1 DIABETES MELLITUS WITHOUT COMPLICATIONS: ICD-10-CM

## 2021-11-13 DIAGNOSIS — K21.9 GASTRO-ESOPHAGEAL REFLUX DISEASE WITHOUT ESOPHAGITIS: ICD-10-CM

## 2021-11-13 DIAGNOSIS — R05.1 ACUTE COUGH: ICD-10-CM

## 2021-11-13 DIAGNOSIS — Z88.1 ALLERGY STATUS TO OTHER ANTIBIOTIC AGENTS STATUS: ICD-10-CM

## 2021-11-13 DIAGNOSIS — Z20.822 CONTACT WITH AND (SUSPECTED) EXPOSURE TO COVID-19: ICD-10-CM

## 2021-11-13 DIAGNOSIS — R00.0 TACHYCARDIA, UNSPECIFIED: ICD-10-CM

## 2021-11-13 DIAGNOSIS — Z88.7 ALLERGY STATUS TO SERUM AND VACCINE: ICD-10-CM

## 2021-11-13 DIAGNOSIS — F41.9 ANXIETY DISORDER, UNSPECIFIED: ICD-10-CM

## 2021-11-13 DIAGNOSIS — Z88.0 ALLERGY STATUS TO PENICILLIN: ICD-10-CM

## 2021-11-13 DIAGNOSIS — Z88.8 ALLERGY STATUS TO OTHER DRUGS, MEDICAMENTS AND BIOLOGICAL SUBSTANCES STATUS: ICD-10-CM

## 2021-11-13 DIAGNOSIS — R07.0 PAIN IN THROAT: ICD-10-CM

## 2021-11-13 LAB
HPIV3 RNA SPEC QL NAA+PROBE: DETECTED
RAPID RVP RESULT: DETECTED
SARS-COV-2 RNA SPEC QL NAA+PROBE: SIGNIFICANT CHANGE UP

## 2021-11-13 PROCEDURE — 71046 X-RAY EXAM CHEST 2 VIEWS: CPT | Mod: 26

## 2021-11-13 PROCEDURE — 93010 ELECTROCARDIOGRAM REPORT: CPT

## 2021-11-13 PROCEDURE — 99284 EMERGENCY DEPT VISIT MOD MDM: CPT

## 2021-11-13 RX ORDER — DEXAMETHASONE 0.5 MG/5ML
10 ELIXIR ORAL ONCE
Refills: 0 | Status: COMPLETED | OUTPATIENT
Start: 2021-11-13 | End: 2021-11-13

## 2021-11-13 RX ORDER — KETOROLAC TROMETHAMINE 30 MG/ML
30 SYRINGE (ML) INJECTION ONCE
Refills: 0 | Status: DISCONTINUED | OUTPATIENT
Start: 2021-11-13 | End: 2021-11-13

## 2021-11-13 RX ORDER — ALBUTEROL 90 UG/1
1 AEROSOL, METERED ORAL ONCE
Refills: 0 | Status: COMPLETED | OUTPATIENT
Start: 2021-11-13 | End: 2021-11-13

## 2021-11-13 RX ADMIN — Medication 30 MILLIGRAM(S): at 14:45

## 2021-11-13 RX ADMIN — ALBUTEROL 1 PUFF(S): 90 AEROSOL, METERED ORAL at 13:25

## 2021-11-13 RX ADMIN — Medication 10 MILLIGRAM(S): at 14:45

## 2021-11-13 NOTE — ED PROVIDER NOTE - NS ED ROS FT
Constitutional: no fever, chills  Eyes: no redness/discharge/pain/vision changes  ENT: + runny nose, scratchy throat.   Cardiac: No chest pain, SOB or edema.  Respiratory: + cough. No respiratory distress  GI: No nausea, vomiting, diarrhea or abdominal pain.  : No dysuria, frequency, urgency or hematuria  MS: no pain to back or extremities, no loss of ROM, no weakness  Neuro: No headache or weakness. No LOC.  Skin: No skin rash.  Endocrine: + hx of diabetes.  Except as documented in the HPI, all other systems are negative.

## 2021-11-13 NOTE — ED PROVIDER NOTE - PATIENT PORTAL LINK FT
You can access the FollowMyHealth Patient Portal offered by [facility] by registering at the following website: [email or web address]. By joining [employer]’s FollowMyHealth portal, you will also be able to view your health information using other applications (apps) compatible with our system.

## 2021-11-13 NOTE — ED PROVIDER NOTE - PHYSICAL EXAMINATION
CONSTITUTIONAL: Well-appearing; well-nourished; in no apparent distress.   EYES: PERRL; EOM intact.   ENT: normal nose; no rhinorrhea; normal pharynx with no tonsillar hypertrophy.   NECK: Supple; non-tender; no cervical lymphadenopathy.   CARDIOVASCULAR: Normal S1, S2; no murmurs, rubs, or gallops.   RESPIRATORY: no signs of resp distress. Normal chest excursion with respiration; breath sounds clear and equal bilaterally; no wheezes, rhonchi, or rales.  GI/: Normal bowel sounds; non-distended; non-tender; no palpable organomegaly.   MS: No evidence of trauma or deformity. Normal ROM in all four extremities; non-tender to palpation; distal pulses are normal.   SKIN: Normal for age and race; warm; dry; good turgor; no apparent lesions or exudate.   NEURO/PSYCH: A & O x 4; grossly unremarkable. mood and manner are appropriate.

## 2021-11-13 NOTE — ED PROVIDER NOTE - NSFOLLOWUPINSTRUCTIONS_ED_ALL_ED_FT
Cough    Coughing is a reflex that clears your throat and your airways. Coughing helps to heal and protect your lungs. It is normal to cough occasionally, but a cough that happens with other symptoms or lasts a long time may be a sign of a condition that needs treatment. Coughing may be caused by infections, asthma or COPD, smoking, postnasal drip, gastroesophageal reflux, as well as other medical conditions. Take medicines only as instructed by your health care provider. Avoid environments or triggers that causes you to cough at work or at home.    SEEK IMMEDIATE MEDICAL CARE IF YOU HAVE ANY OF THE FOLLOWING SYMPTOMS: coughing up blood, shortness of breath, rapid heart rate, chest pain, unexplained weight loss or night sweats.    Please follow up with your primary care doctor within one week

## 2021-11-13 NOTE — ED PROVIDER NOTE - OBJECTIVE STATEMENT
36 year old F with hx of IDDM, anxiety/depression 36 year old F with hx of IDDM, anxiety/depression c/o 1 week of dry cough, runny nose and scratchy throat. Pt was given zpak and tessalon pearles yesterday. Sts has hx of bronchitis and symptoms feel similar. Pts mother is also sick with uri symptoms. No fever, sob, chills, n/v/d/abd pain, recent travel, leg pain/swelling, tobacco use. pt not utd with covid vaccine

## 2021-11-13 NOTE — ED ADULT TRIAGE NOTE - CHIEF COMPLAINT QUOTE
BIBA from [employer] for cough that started a week ago, pt started on the zpack yesterday. c/o back and chest pain

## 2021-11-13 NOTE — ED PROVIDER NOTE - CLINICAL SUMMARY MEDICAL DECISION MAKING FREE TEXT BOX
36 yr old f that presents with viral like illness. will obtain viral panel and cxr. meds. will dc pt with pcp follow up and strict return precautions

## 2021-11-13 NOTE — ED PROVIDER NOTE - ATTENDING CONTRIBUTION TO CARE
36 yr old f w/ a pmh significant for DM, anxiety/depression who presents with a week of cough, runny nose and scratchy throat. Pt states that she completed a course of azithromycin with no relief. Pt denies any fevers, chills, sob, chills, abd pain, nausea, vomiting, diarrhea or any other complaints.     VITAL SIGNS: I have reviewed nursing notes and confirm.  CONSTITUTIONAL: non-toxic, well appearing  SKIN: no rash, no petechiae.  EYES:  EOMI, pink conjunctiva, anicteric  ENT: tongue midline, no exudates, MMM  NECK: Supple; no meningismus, no JVD  CARD: RRR, no murmurs, equal radial pulses bilaterally 2+  RESP: CTAB, no respiratory distress  ABD: Soft, non-tender, non-distended, no peritoneal signs, no HSM, no CVA tenderness  EXT: Normal ROM x4. No edema. No calves tenderness  NEURO: Alert, oriented. CN2-12 intact, equal strength bilaterally, nl gait.  PSYCH: Cooperative, appropriate.    a/p  36 yr old f that presents with viral like illness. will obtain viral panel and cxr. meds. will dc pt with pcp follow up and strict return precautions.

## 2021-11-13 NOTE — ED ADULT NURSE NOTE - NS ED NURSE DISCH DISPOSITION
Left voicemail message for patient requesting a return call regarding scheduled video appt for 4/13/20.  Please send My Chart msg with link.  
Discharged

## 2021-11-13 NOTE — ED ADULT NURSE NOTE - CHIEF COMPLAINT QUOTE
BIBA from [employer] for cough that started a week ago, pt started on the zpack yesterday. c/o back and chest pain

## 2021-11-14 ENCOUNTER — EMERGENCY (EMERGENCY)
Facility: HOSPITAL | Age: 36
LOS: 0 days | Discharge: HOME | End: 2021-11-14
Attending: EMERGENCY MEDICINE | Admitting: EMERGENCY MEDICINE
Payer: MEDICAID

## 2021-11-14 VITALS
RESPIRATION RATE: 18 BRPM | HEART RATE: 103 BPM | DIASTOLIC BLOOD PRESSURE: 76 MMHG | OXYGEN SATURATION: 97 % | HEIGHT: 64 IN | WEIGHT: 175.05 LBS | SYSTOLIC BLOOD PRESSURE: 130 MMHG | TEMPERATURE: 97 F

## 2021-11-14 DIAGNOSIS — Z88.8 ALLERGY STATUS TO OTHER DRUGS, MEDICAMENTS AND BIOLOGICAL SUBSTANCES STATUS: ICD-10-CM

## 2021-11-14 DIAGNOSIS — E10.9 TYPE 1 DIABETES MELLITUS WITHOUT COMPLICATIONS: ICD-10-CM

## 2021-11-14 DIAGNOSIS — Z88.1 ALLERGY STATUS TO OTHER ANTIBIOTIC AGENTS STATUS: ICD-10-CM

## 2021-11-14 DIAGNOSIS — R00.0 TACHYCARDIA, UNSPECIFIED: ICD-10-CM

## 2021-11-14 DIAGNOSIS — Z88.7 ALLERGY STATUS TO SERUM AND VACCINE: ICD-10-CM

## 2021-11-14 DIAGNOSIS — Z88.0 ALLERGY STATUS TO PENICILLIN: ICD-10-CM

## 2021-11-14 DIAGNOSIS — K62.5 HEMORRHAGE OF ANUS AND RECTUM: ICD-10-CM

## 2021-11-14 DIAGNOSIS — Z79.82 LONG TERM (CURRENT) USE OF ASPIRIN: ICD-10-CM

## 2021-11-14 PROCEDURE — 99283 EMERGENCY DEPT VISIT LOW MDM: CPT

## 2021-11-14 NOTE — ED PROVIDER NOTE - PATIENT PORTAL LINK FT
You can access the FollowMyHealth Patient Portal offered by [facility] by registering at the following website: [email or web address]. By joining [employer]’s FollowMyHealth portal, you will also be able to view your health information using other applications (apps) compatible with our system.

## 2021-11-14 NOTE — ED PROVIDER NOTE - CLINICAL SUMMARY MEDICAL DECISION MAKING FREE TEXT BOX
right red blood when gong to bathroom. Pt is here clinically stable. Likely hemorrhoids. pt in ed for prolonged period no futher episodes.

## 2021-11-14 NOTE — ED PROVIDER NOTE - OBJECTIVE STATEMENT
35 y/o female with hx of tachycardia, hemorrhoid, DM who presents with rectal bleeding. Reports that she had a bowel movement earlier today and noticed some bright red blood in the tool and on her toilet paper. Denies rectal pain with bowel movement. Denies fever, vomiting, diarrhea, constipation, vaginal discharge/bleeding. 35 y/o female with hx of tachycardia, hemorrhoid, DM who presents with rectal bleeding. Reports that she had a bowel movement earlier today and noticed some bright red blood in the stool and on her toilet paper. Denies rectal pain with bowel movement. Denies fever, vomiting, diarrhea, constipation, vaginal discharge/bleeding.

## 2021-11-14 NOTE — ED PROVIDER NOTE - NS ED ROS FT
Constitutional: Negative for fever  HENT: Negative for headache,   Eyes: Negative for vision change.  Cardiovascular: Negative for chest pain, palpitation, and orthopnea.  Respiratory: Negative for SOB, wheezing, cough and sputum production.  Gastrointestinal: +hematochezia. Negative for nausea, vomiting, constipation, diarrhea  Genitourinary: Negative for flank pain, dysuria, urgency, frequency, hematuria, and urinary retention.  Neurological: Negative for dizziness, syncope, and loss of consciousness.  Musculoskeletal: Negative for joint swelling, arthralgias, back pain  Hematological: Does not bruise/bleed easily.

## 2021-11-14 NOTE — ED PROVIDER NOTE - NSFOLLOWUPINSTRUCTIONS_ED_ALL_ED_FT
Rectal Bleeding    WHAT YOU NEED TO KNOW:    What can cause rectal bleeding?   •Constipation      •Hemorrhoids (swollen blood vessels in your rectum)      •Anal fissures (tears in the tissue inside your anus)      •Medical conditions, such as cancer, colitis, or diverticulitis       •Growths, such as tumors or polyps      •Medical treatments, such as radiation or rectal surgery      What increases my risk for rectal bleeding?   •Older age      •Certain medicines, such as blood thinners and NSAIDs      •Medical conditions, such as inflammatory bowel disease, liver disease, or HIV      What other signs and symptoms may happen with rectal bleeding? You may have pain in your rectum or anus. You may also have abdominal pain or cramping.    How is the cause of rectal bleeding diagnosed?   •Rectal exam: Your healthcare provider may gently insert a gloved finger into your anus. He will collect a bowel movement sample and send it to a lab for tests.      •Blood tests: You may need blood taken to check for anemia (low amount of red blood cells).      •CT scan: This test is also called a CAT scan. An x-ray machine uses a computer to take pictures of the organs and blood vessels in your abdomen. The pictures may show problems that could cause bleeding. You may be given a dye before the pictures are taken to help healthcare providers see the pictures better. Tell the healthcare provider if you have ever had an allergic reaction to contrast dye.      •Colonoscopy: This is a procedure to look inside your lower bowel. It may show where the bleeding is coming from and what is causing it. A tube with a light on the end will be put into your anus and then moved into your colon. If your healthcare provider finds a growth, he may remove it.      •Endoscopy: This is a procedure to look inside your upper bowel. It may show where the bleeding is coming from and what is causing it. A tube with a light on the end is inserted into your throat and moved down into your stomach and upper bowel. If your healthcare provider finds a growth, he may remove it. He may put a shot of medicine in bleeding areas to narrow the blood vessels and stop the bleeding. Heat, laser, or electric currents may also be used to make the blood clot.      How is rectal bleeding treated?   •Medicine: ?Pain medicine: You may be given a prescription medicine to decrease pain. Do not wait until the pain is severe before you take this medicine.      ?Vasoconstrictors: This medicine decreases the size of your blood vessels and may help stop the bleeding.       ?Iron supplement: Iron helps your body make more red blood cells.       ?Steroids: This medicine decreases inflammation in your rectum. It may be applied as a cream, ointment, or lotion.      •IV: You may need an IV if you are dehydrated and need extra liquids.      •Blood transfusion: You will get whole or parts of blood through an IV during a transfusion. Blood is tested for diseases, such as hepatitis and HIV, to be sure it is safe.       •Surgery: You may need surgery to remove hemorrhoids, tumors, or polyps.      What are the risks of rectal bleeding?   •You may have abdominal pain or damage to nearby organs and blood vessels with surgery. Even with treatment, rectal bleeding may continue. Or, it may go away for a time and start again.       •Without treatment, you may continue to have pain and cramping. You may develop anemia. You may need a blood transfusion. You may lose a large amount of blood. This can be life-threatening.      How can I manage my symptoms? Ask your healthcare provider how much liquid to drink each day and which liquids are best for you. This will help prevent dehydration and constipation.    When should I contact my healthcare provider?   •You have a fever.      •Your rectal bleeding stopped for a time, but has started again.       •You have nausea.      •You have cold, sweaty, pale skin.      •You have changes in your bowel movements, such as diarrhea.      •You have questions or concerns about your condition or care.      When should I seek immediate care or call 911?   •You are breathing faster than usual.      •You are dizzy, lightheaded, or feel faint.      •You are confused or cannot think clearly.      •You urinate less than usual or not at all.      •Your rectal bleeding is constant or heavy.      •You have severe abdominal pain or cramping.      CARE AGREEMENT:    You have the right to help plan your care. Learn about your health condition and how it may be treated. Discuss treatment options with your healthcare providers to decide what care you want to receive. You always have the right to refuse treatment.

## 2021-11-14 NOTE — ED PROVIDER NOTE - PHYSICAL EXAMINATION
CONSTITUTIONAL: Well-appearing; well-nourished; in no apparent distress.   ENT: Hearing is intact with good acuity to spoken voice. Patient is speaking clearly, not muffled and airway is intact.   RESPIRATORY:  No signs of respiratory distress. Lung sounds are clear in all lobes bilaterally without rales, rhonchi, or wheezes.  CARDIOVASCULAR: Regular rate and rhythm.   GI: Abdomen is soft, non-tender,   Rectal: Chaperone: Lori . Normal rectal sphincter tone. No external masses or lesions. Small amount of bright red blood noticed with no profusely bleeding; no dark colored stool noticed.  EXT: Normal appearance and ROM in all four extremities. Steady gait noted.  NEURO: A & O x 4. Normal speech.   PSYCHOLOGICAL: Appropriate mood and affect. Good judgement and insight.

## 2021-11-14 NOTE — ED PROVIDER NOTE - PROGRESS NOTE DETAILS
ATTENDING NOTE:  37 y/o F, not on blood thinners, here for rectal bleeding. This AM, pt went to the bathroom and noticed blood on the toilet paper and in the bowl. To me, denies abdominal pain and light headedness.   Well appearing, walking around the room with no difficulty. Abdomen soft, non-tender. Rectal exam by TORY Diaz. No hemorrhoids visualized and traced bright red blood.   Impression: bright red blood when gong to bathroom. Pt is here clinically stable. Likely hemorrhoids. Will d/c after repeat heartrate. Concerning signs and Sx discussed with pt. Discharge HR is 106, but pt reports that her baseline HR is around 113. PA fellow note reviewed and agree, patient HR noted 106,, Noted patients previous ED visits with HR always low 100s as her normal baseline

## 2021-11-14 NOTE — ED PROVIDER NOTE - CARE PROVIDER_API CALL
Semaj Stroud)  Internal Medicine  [address], [address]  [place] [address]  Phone: [phone]  Fax: [phone]  Follow Up Time: 1-3 Days

## 2021-11-17 ENCOUNTER — OUTPATIENT (OUTPATIENT)
Dept: OUTPATIENT SERVICES | Facility: HOSPITAL | Age: 36
LOS: 1 days | Discharge: HOME | End: 2021-11-17

## 2021-11-17 ENCOUNTER — APPOINTMENT (OUTPATIENT)
Dept: INTERNAL MEDICINE | Facility: CLINIC | Age: 36
End: 2021-11-17
Payer: MEDICAID

## 2021-11-17 ENCOUNTER — NON-APPOINTMENT (OUTPATIENT)
Age: 36
End: 2021-11-17

## 2021-11-17 VITALS
TEMPERATURE: 98.5 F | HEIGHT: 64 IN | HEART RATE: 114 BPM | DIASTOLIC BLOOD PRESSURE: 96 MMHG | OXYGEN SATURATION: 98 % | BODY MASS INDEX: 29.37 KG/M2 | SYSTOLIC BLOOD PRESSURE: 154 MMHG | WEIGHT: 172 LBS

## 2021-11-17 PROCEDURE — 99214 OFFICE O/P EST MOD 30 MIN: CPT | Mod: GC

## 2021-11-17 RX ORDER — FLUCONAZOLE 150 MG/1
150 TABLET ORAL
Qty: 3 | Refills: 0 | Status: DISCONTINUED | COMMUNITY
Start: 2021-08-26 | End: 2021-11-17

## 2021-11-17 NOTE — REVIEW OF SYSTEMS
[Palpitations] : palpitations [Cough] : cough [Abdominal Pain] : abdominal pain [Anxiety] : anxiety [Negative] : Musculoskeletal [Chest Pain] : no chest pain [Shortness Of Breath] : no shortness of breath [Wheezing] : no wheezing [Nausea] : no nausea [Vomiting] : no vomiting [Suicidal] : not suicidal [Insomnia] : no insomnia [Depression] : no depression [FreeTextEntry4] : productive cough

## 2021-11-17 NOTE — ASSESSMENT
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 \par pt noted that she is having daily panic attack with palpitations, epigastric pain , light headedness. \par She is following routinely with Endocrine, psych for anxiety , insulin pump was recently adjusted, FS>200 persistently . She is following routinely with Optho, Podiatry. \par \par Was previously following with Uro-gyn for vulvodynia, but stopped following due to no therapies or treatment being offered. Was recommend to go to [place] to a vulvodynia specialist, however, cannot go there due to transportation issues because she lives in  group home.\par \par Pt has panic attack during the encounter and was sent to ED. \par \par \par # Recent viral URI:\par - still complaining of cough \par - will renew her tesslaon and z-pack for 1 week\par \par #Hx of Palpitations, Tachycardia\par # Hx of anxiety \par - next week stress echo test with cardio ( [facility]) \par - pt seen psych, now taking amitriptyline 75 OD, d/c melatonin and buspar\par - C/w Metoprolol 50 mg BID and amitriptyline \par - no SI/HI \par - Wishes to changes her psych, referral given \par \par # DM Type I\par - Uncontrolled, consistently>200, self injects insulin PRN \par - HbA1c 8.7% Jan 2021\par - will repeat for next visit \par - Follows with Endo\par - Follows with Optho, Podiatry\par - C/w insulin pump\par \par #Hx of Migraines\par - Well controlled with NSAIDs as needed\par \par # Hx of tremors:\par - Following with neuro, next week appt \par - c/w cyclobenzaprine and toprol\par \par #GERD\par - C/w Famotidine\par \par #Chronic pelvic pain, vulvodynia\par - Was following with Uro gyn, no longer following due to no relief offered \par - C/w Amitriptyline, Flexeril\par \par #HCM\par - Pap Smear 2021 , nl \par - Mammo done 2020 BIRADS 1\par - pt says she is unable to take vaccine( flu and covid)  d/t allergic reaction after flu vaccin previously . \par - will repeat routine labs

## 2021-11-17 NOTE — PHYSICAL EXAM
[Normal] : soft, non-tender, non-distended, no masses palpated, no HSM and normal bowel sounds [No Focal Deficits] : no focal deficits [Normal Affect] : the affect was normal [Normal Mood] : the mood was normal [de-identified] : anxious and rest

## 2021-11-17 NOTE — HISTORY OF PRESENT ILLNESS
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 will follow with GI outpt.  \par She is following routinely with Endocrine, insulin pump was recently adjusted, FS>200 persistently . She is following routinely with Optho, Podiatry. also following w/ psych for anxiety.\par \par Was previously following with Uro-gyn for vulvodynia, but stopped following due to no therapies or treatment being offered. Was recommend to go to [place] to a vulvodynia specialist, however, cannot go there due to transportation issues because she lives in  group home.\par \par Pt has panic attack during the encounter. \par Denies any fever, chills, dysuria, change in BM or urinary sx.

## 2021-11-18 DIAGNOSIS — R51.9 HEADACHE, UNSPECIFIED: ICD-10-CM

## 2021-11-18 DIAGNOSIS — F41.9 ANXIETY DISORDER, UNSPECIFIED: ICD-10-CM

## 2021-11-18 DIAGNOSIS — J45.909 UNSPECIFIED ASTHMA, UNCOMPLICATED: ICD-10-CM

## 2021-11-19 ENCOUNTER — EMERGENCY (EMERGENCY)
Facility: HOSPITAL | Age: 36
LOS: 0 days | Discharge: NURSING, REHAB OR LONG-TERM CARE | End: 2021-11-19
Attending: EMERGENCY MEDICINE | Admitting: EMERGENCY MEDICINE
Payer: MEDICAID

## 2021-11-19 VITALS
SYSTOLIC BLOOD PRESSURE: 140 MMHG | DIASTOLIC BLOOD PRESSURE: 90 MMHG | HEART RATE: 100 BPM | OXYGEN SATURATION: 100 % | RESPIRATION RATE: 18 BRPM

## 2021-11-19 VITALS
RESPIRATION RATE: 18 BRPM | TEMPERATURE: 98 F | HEART RATE: 100 BPM | DIASTOLIC BLOOD PRESSURE: 84 MMHG | SYSTOLIC BLOOD PRESSURE: 135 MMHG | OXYGEN SATURATION: 100 %

## 2021-11-19 DIAGNOSIS — K92.1 MELENA: ICD-10-CM

## 2021-11-19 DIAGNOSIS — R19.7 DIARRHEA, UNSPECIFIED: ICD-10-CM

## 2021-11-19 DIAGNOSIS — Z88.1 ALLERGY STATUS TO OTHER ANTIBIOTIC AGENTS STATUS: ICD-10-CM

## 2021-11-19 DIAGNOSIS — Z88.8 ALLERGY STATUS TO OTHER DRUGS, MEDICAMENTS AND BIOLOGICAL SUBSTANCES STATUS: ICD-10-CM

## 2021-11-19 DIAGNOSIS — K21.9 GASTRO-ESOPHAGEAL REFLUX DISEASE WITHOUT ESOPHAGITIS: ICD-10-CM

## 2021-11-19 DIAGNOSIS — Z20.822 CONTACT WITH AND (SUSPECTED) EXPOSURE TO COVID-19: ICD-10-CM

## 2021-11-19 DIAGNOSIS — R11.0 NAUSEA: ICD-10-CM

## 2021-11-19 DIAGNOSIS — Z88.7 ALLERGY STATUS TO SERUM AND VACCINE: ICD-10-CM

## 2021-11-19 DIAGNOSIS — E10.9 TYPE 1 DIABETES MELLITUS WITHOUT COMPLICATIONS: ICD-10-CM

## 2021-11-19 DIAGNOSIS — F32.9 MAJOR DEPRESSIVE DISORDER, SINGLE EPISODE, UNSPECIFIED: ICD-10-CM

## 2021-11-19 DIAGNOSIS — R10.30 LOWER ABDOMINAL PAIN, UNSPECIFIED: ICD-10-CM

## 2021-11-19 DIAGNOSIS — Z88.0 ALLERGY STATUS TO PENICILLIN: ICD-10-CM

## 2021-11-19 LAB
ALBUMIN SERPL ELPH-MCNC: 4.5 G/DL — SIGNIFICANT CHANGE UP (ref 3.5–5.2)
ALP SERPL-CCNC: 106 U/L — SIGNIFICANT CHANGE UP (ref 30–115)
ALT FLD-CCNC: 73 U/L — HIGH (ref 0–41)
ANION GAP SERPL CALC-SCNC: 14 MMOL/L — SIGNIFICANT CHANGE UP (ref 7–14)
APTT BLD: 34.8 SEC — SIGNIFICANT CHANGE UP (ref 27–39.2)
AST SERPL-CCNC: 43 U/L — HIGH (ref 0–41)
BASOPHILS # BLD AUTO: 0.03 K/UL — SIGNIFICANT CHANGE UP (ref 0–0.2)
BASOPHILS NFR BLD AUTO: 0.3 % — SIGNIFICANT CHANGE UP (ref 0–1)
BILIRUB SERPL-MCNC: 0.3 MG/DL — SIGNIFICANT CHANGE UP (ref 0.2–1.2)
BLD GP AB SCN SERPL QL: SIGNIFICANT CHANGE UP
BUN SERPL-MCNC: 9 MG/DL — LOW (ref 10–20)
CALCIUM SERPL-MCNC: 9.2 MG/DL — SIGNIFICANT CHANGE UP (ref 8.5–10.1)
CHLORIDE SERPL-SCNC: 101 MMOL/L — SIGNIFICANT CHANGE UP (ref 98–110)
CO2 SERPL-SCNC: 21 MMOL/L — SIGNIFICANT CHANGE UP (ref 17–32)
CREAT SERPL-MCNC: 0.6 MG/DL — LOW (ref 0.7–1.5)
EOSINOPHIL # BLD AUTO: 0.04 K/UL — SIGNIFICANT CHANGE UP (ref 0–0.7)
EOSINOPHIL NFR BLD AUTO: 0.4 % — SIGNIFICANT CHANGE UP (ref 0–8)
GLUCOSE SERPL-MCNC: 280 MG/DL — HIGH (ref 70–99)
HCG SERPL QL: NEGATIVE — SIGNIFICANT CHANGE UP
HCT VFR BLD CALC: 37.2 % — SIGNIFICANT CHANGE UP (ref 37–47)
HGB BLD-MCNC: 11.9 G/DL — LOW (ref 12–16)
IMM GRANULOCYTES NFR BLD AUTO: 0.5 % — HIGH (ref 0.1–0.3)
INR BLD: 1.11 RATIO — SIGNIFICANT CHANGE UP (ref 0.65–1.3)
LACTATE SERPL-SCNC: 1.3 MMOL/L — SIGNIFICANT CHANGE UP (ref 0.7–2)
LIDOCAIN IGE QN: 12 U/L — SIGNIFICANT CHANGE UP (ref 7–60)
LYMPHOCYTES # BLD AUTO: 2.07 K/UL — SIGNIFICANT CHANGE UP (ref 1.2–3.4)
LYMPHOCYTES # BLD AUTO: 20.8 % — SIGNIFICANT CHANGE UP (ref 20.5–51.1)
MCHC RBC-ENTMCNC: 27.5 PG — SIGNIFICANT CHANGE UP (ref 27–31)
MCHC RBC-ENTMCNC: 32 G/DL — SIGNIFICANT CHANGE UP (ref 32–37)
MCV RBC AUTO: 86.1 FL — SIGNIFICANT CHANGE UP (ref 81–99)
MONOCYTES # BLD AUTO: 0.5 K/UL — SIGNIFICANT CHANGE UP (ref 0.1–0.6)
MONOCYTES NFR BLD AUTO: 5 % — SIGNIFICANT CHANGE UP (ref 1.7–9.3)
NEUTROPHILS # BLD AUTO: 7.27 K/UL — HIGH (ref 1.4–6.5)
NEUTROPHILS NFR BLD AUTO: 73 % — SIGNIFICANT CHANGE UP (ref 42.2–75.2)
NRBC # BLD: 0 /100 WBCS — SIGNIFICANT CHANGE UP (ref 0–0)
PLATELET # BLD AUTO: 454 K/UL — HIGH (ref 130–400)
POTASSIUM SERPL-MCNC: 4.6 MMOL/L — SIGNIFICANT CHANGE UP (ref 3.5–5)
POTASSIUM SERPL-SCNC: 4.6 MMOL/L — SIGNIFICANT CHANGE UP (ref 3.5–5)
PROT SERPL-MCNC: 7.4 G/DL — SIGNIFICANT CHANGE UP (ref 6–8)
PROTHROM AB SERPL-ACNC: 12.8 SEC — SIGNIFICANT CHANGE UP (ref 9.95–12.87)
RBC # BLD: 4.32 M/UL — SIGNIFICANT CHANGE UP (ref 4.2–5.4)
RBC # FLD: 13.5 % — SIGNIFICANT CHANGE UP (ref 11.5–14.5)
SARS-COV-2 RNA SPEC QL NAA+PROBE: SIGNIFICANT CHANGE UP
SODIUM SERPL-SCNC: 136 MMOL/L — SIGNIFICANT CHANGE UP (ref 135–146)
WBC # BLD: 9.96 K/UL — SIGNIFICANT CHANGE UP (ref 4.8–10.8)
WBC # FLD AUTO: 9.96 K/UL — SIGNIFICANT CHANGE UP (ref 4.8–10.8)

## 2021-11-19 PROCEDURE — 99284 EMERGENCY DEPT VISIT MOD MDM: CPT

## 2021-11-19 RX ORDER — ONDANSETRON 8 MG/1
1 TABLET, FILM COATED ORAL
Qty: 9 | Refills: 0
Start: 2021-11-19 | End: 2021-11-21

## 2021-11-19 RX ORDER — HYDROXYZINE HCL 10 MG
25 TABLET ORAL ONCE
Refills: 0 | Status: COMPLETED | OUTPATIENT
Start: 2021-11-19 | End: 2021-11-19

## 2021-11-19 RX ORDER — SODIUM CHLORIDE 9 MG/ML
1000 INJECTION INTRAMUSCULAR; INTRAVENOUS; SUBCUTANEOUS ONCE
Refills: 0 | Status: COMPLETED | OUTPATIENT
Start: 2021-11-19 | End: 2021-11-19

## 2021-11-19 RX ADMIN — Medication 25 MILLIGRAM(S): at 13:41

## 2021-11-19 RX ADMIN — SODIUM CHLORIDE 1000 MILLILITER(S): 9 INJECTION INTRAMUSCULAR; INTRAVENOUS; SUBCUTANEOUS at 10:00

## 2021-11-19 NOTE — ED PROVIDER NOTE - PROVIDER TOKENS
FREE:[LAST:[your PMD],PHONE:[(   )    -],FAX:[(   )    -],FOLLOWUP:[1-3 Days]],PROVIDER:[TOKEN:[[id number]],FOLLOWUP:[1-3 Days]],PROVIDER:[TOKEN:[[id number]],FOLLOWUP:[1-3 Days]],PROVIDER:[TOKEN:[[id number]],FOLLOWUP:[1-3 Days]]

## 2021-11-19 NOTE — ED PROVIDER NOTE - NS ED ROS FT
Review of Systems    Constitutional: (-) fever, (-) chills  Eyes/ENT: (-) blurry vision, (-) epistaxis, (-) sore throat  Cardiovascular: (-) chest pain, (-) syncope  Respiratory: (-) cough, (-) shortness of breath  Gastrointestinal: (+) cramps, (-) nausea, (-) vomiting, (+) black and bloody diarrhea  Musculoskeletal: (-) neck pain, (-) back pain, (-) body aches  Integumentary: (-) rash, (-) edema  Neurological: (-) headache, (-) altered mental status  Psychiatric: (-) hallucinations  Allergic/Immunologic: (-) pruritus

## 2021-11-19 NOTE — ED PROVIDER NOTE - CARE PROVIDERS DIRECT ADDRESSES
,DirectAddress_Unknown,[email or web address],DirectAddress_Unknown,DirectAddress_Unknown

## 2021-11-19 NOTE — ED PROVIDER NOTE - OBJECTIVE STATEMENT
35 yo F hx of  angina, depression, GERD c/o black and bloody stools/diarrhea and abdominal cramping since yesterday. No f/c/n/v. Patient was seen in ED recently for abdominal pains and bloody stools. 35 yo F hx of  angina, depression, GERD c/o black and bloody stools/diarrhea and abdominal cramping since yesterday. No f/c/n/v. Patient was seen in ED recently for abdominal pains and bloody stools. Patient unable to get appt with GI until next year.

## 2021-11-19 NOTE — ED PROVIDER NOTE - CLINICAL SUMMARY MEDICAL DECISION MAKING FREE TEXT BOX
Patient presents with diarrhea. non tender abdomen. labs done. Feeling better after fluids. Tolerating po. Discharged with pmd follow up and return precautions.

## 2021-11-19 NOTE — ED PROVIDER NOTE - PHYSICAL EXAMINATION
Gen: Alert, NAD, well appearing  Head: NC, AT, PERRL, EOMI, normal lids/conjunctiva  ENT: normal hearing  Neck: +supple, no tenderness/meningismus,  Pulm: Bilateral BS, normal resp effort, no wheeze/stridor/retractions  CV: S1S2, tachy  Abd: soft, NT/ND. Rectal: No stool. No blood noted. chaperoned by Love, CANDELARIA  Mskel: no edema/erythema/cyanosis  Skin: no rash, warm/dry  Neuro: AAOx3, no sensory/motor deficits

## 2021-11-19 NOTE — ED PROVIDER NOTE - CARE PROVIDER_API CALL
your PMD,   Phone: (   )    -  Fax: (   )    -  Follow Up Time: 1-3 Days    Lyndsey Arellano)  Gastroenterology  [address] [place] [address]  Phone: [phone]  Fax: [phone]  Follow Up Time: 1-3 Days    Amadeo Jones (DO)  Gastroenterology  [address] [place] [address]  Phone: [phone]  Fax: [phone]  Follow Up Time: 1-3 Days    Shaka Harper)  Gastroenterology  [address] [place] [address]  Phone: [phone]  Fax: [phone]  Follow Up Time: 1-3 Days

## 2021-11-19 NOTE — ED PROVIDER NOTE - PROGRESS NOTE DETAILS
ATTENDING NOTE: I personally evaluated the patient. I reviewed the Resident’s note (as assigned above), and agree with the findings and plan except as documented in my note. 37 y/o F PMH DM, diabetic neuropathy and tachycardia presents with lower ABD pain and rectal bleeding. Pt reports that over the past 2 days, she has had multiple episodes of diarrhea and the last two noted black stools. Pt denies any bright red blood. Pt also reports cramping pain to the lower ABD, + nausea but no vomiting. Denies fever or urinary symptoms. Pt called her GI Dr. Mejia and has an appointment in January. No previous endoscopy or colonoscopy. Pt did get a CT ABD done November 6th which was normal.    On exam: CONSTITUTIONAL: Well-developed; well-nourished; in no acute distress. SKIN: warm, dry. HEAD: Normocephalic; atraumatic. EYES: PERRL, EOMI, no conjunctival erythema. ENT: No nasal discharge; airway clear. NECK: Supple; non tender. CARD: S1, S2 normal. Regular rate and rhythm.  RESP: No wheezes, rales or rhonchi. ABD: soft non tender, non distended, no rebound or guarding, no CVA tenderness. EXT:  5/5 strength in all 4 extremities. LYMPH: No acute cervical adenopathy. NEURO: responsive to painful stimuli, no focal deficits, neurovascularly intact. PSYCH: Cooperative, appropriate.    Plan for blood work, urine and meds. Patient tolerated PO

## 2021-11-19 NOTE — ED ADULT NURSE NOTE - NSIMPLEMENTINTERV_GEN_ALL_ED
Implemented All Universal Safety Interventions:  [place] to call system. Call bell, personal items and telephone within reach. Instruct patient to call for assistance. Room bathroom lighting operational. Non-slip footwear when patient is off stretcher. Physically safe environment: no spills, clutter or unnecessary equipment. Stretcher in lowest position, wheels locked, appropriate side rails in place.

## 2021-11-19 NOTE — ED PROVIDER NOTE - PATIENT PORTAL LINK FT
You can access the FollowMyHealth Patient Portal offered by [facility] by registering at the following website: [email or web address]. By joining [employer]’s FollowMyHealth portal, you will also be able to view your health information using other applications (apps) compatible with our system.

## 2021-11-19 NOTE — ED PROVIDER NOTE - NSFOLLOWUPINSTRUCTIONS_ED_ALL_ED_FT
Diarrhea    Diarrhea is frequent loose and watery bowel movements that has many causes. Diarrhea can make you feel weak and cause you to become dehydrated. Diarrhea typically lasts 3-5 days. However, it can last longer if it is a sign of something more serious. Drink clear fluids to prevent dehydration. Eat bland, easy-to-digest foods as tolerated.     SEEK IMMEDIATE MEDICAL CARE IF YOU HAVE THE FOLLOWING SYMPTOMS: fever, lightheadedness/dizziness, chest pain, black or bloody stools, shortness of breath, severe abdominal or back pain, or any signs of dehydration.    Rectal Bleeding      Rectal bleeding is when blood comes out of the opening of the butt (anus). People with this kind of bleeding may notice bright red blood in their underwear or in the toilet after they poop (have a bowel movement). They may also have dark red or black poop (stool). Rectal bleeding is often a sign that something is wrong. It needs to be checked by a doctor.      Follow these instructions at home:    Watch for any changes in your condition. Take these actions to help with bleeding and discomfort:  Eat a diet that is high in fiber. This will keep your poop soft so it is easier for you to poop without pushing too hard. Ask your doctor to tell you what foods and drinks are high in fiber.  Drink enough fluid to keep your pee (urine) clear or pale yellow. This also helps keep your poop soft.  Try taking a warm bath. This may help with pain.  Keep all follow-up visits as told by your doctor. This is important.      Get help right away if:    You have new bleeding.  ImageYou have more bleeding than before.  You have black or dark red poop.  You throw up (vomit) blood or something that looks like coffee grounds.  You have pain or tenderness in your belly (abdomen).  You have a fever.  You feel weak.  You feel sick to your stomach (nauseous).  You pass out (faint).  You have very bad pain in your butt.  You cannot poop.  This information is not intended to replace advice given to you by your health care provider. Make sure you discuss any questions you have with your health care provider.

## 2021-11-22 ENCOUNTER — APPOINTMENT (OUTPATIENT)
Dept: NEUROLOGY | Facility: CLINIC | Age: 36
End: 2021-11-22
Payer: MEDICAID

## 2021-11-22 ENCOUNTER — NON-APPOINTMENT (OUTPATIENT)
Age: 36
End: 2021-11-22

## 2021-11-22 VITALS
WEIGHT: 172 LBS | TEMPERATURE: 98.1 F | OXYGEN SATURATION: 96 % | SYSTOLIC BLOOD PRESSURE: 128 MMHG | BODY MASS INDEX: 29.37 KG/M2 | HEIGHT: 64 IN | HEART RATE: 105 BPM | DIASTOLIC BLOOD PRESSURE: 85 MMHG

## 2021-11-22 PROCEDURE — 99212 OFFICE O/P EST SF 10 MIN: CPT

## 2021-11-24 NOTE — PHYSICAL EXAM
[FreeTextEntry1] : Neurological Exam: \par Mental status: Awake, alert and oriented x3. Recent and remote memory intact. Naming, repetition and comprehension intact. Attention/concentration intact. No dysarthria, no aphasia. Fund of knowledge appropriate. \par Cranial nerves: Pupils equally round and reactive to light, visual fields full, no nystagmus, extraocular muscles intact, V1 through V3 intact bilaterally and symmetric, face symmetric, hearing intact to finger rub, palate elevation symmetric, tongue was midline.\par Motor: MRC grading 5/5 b/l UE/LE.  strength 5/5. Normal tone and bulk. No abnormal movements. OUtward rotated ankle. \par Sensation: Intact to light touch, proprioception, and pinprick. \par Coordination: No dysmetria on finger-to-nose and heel-to-shin. No dysdiadokinesia.\par Reflexes: hyporeflexic\par Gait: Narrow and steady. No ataxia. Romberg negative

## 2021-11-24 NOTE — HISTORY OF PRESENT ILLNESS
[FreeTextEntry1] : CRISTI MONTGOMERY is a 36 year old woman is here a follow up for migraine. In the last visit stated that Elavil was stopped since her psychiatrist changes her anxiety medication and stopped Elavil concerned to medication interaction. Her anxiety symptoms got worse so she is now back on Elavil. She always had good response to Elavil with good control of her headaches. For past three week she was having bronchitis with chest pain and productive cough.

## 2021-11-29 ENCOUNTER — EMERGENCY (EMERGENCY)
Facility: HOSPITAL | Age: 36
LOS: 0 days | Discharge: HOME | End: 2021-11-29
Attending: EMERGENCY MEDICINE | Admitting: EMERGENCY MEDICINE
Payer: MEDICAID

## 2021-11-29 VITALS
DIASTOLIC BLOOD PRESSURE: 78 MMHG | TEMPERATURE: 98 F | HEART RATE: 100 BPM | OXYGEN SATURATION: 99 % | RESPIRATION RATE: 18 BRPM | SYSTOLIC BLOOD PRESSURE: 130 MMHG

## 2021-11-29 VITALS
HEART RATE: 116 BPM | RESPIRATION RATE: 18 BRPM | WEIGHT: 169.98 LBS | HEIGHT: 64 IN | OXYGEN SATURATION: 99 % | SYSTOLIC BLOOD PRESSURE: 143 MMHG | DIASTOLIC BLOOD PRESSURE: 87 MMHG | TEMPERATURE: 98 F

## 2021-11-29 DIAGNOSIS — Z20.822 CONTACT WITH AND (SUSPECTED) EXPOSURE TO COVID-19: ICD-10-CM

## 2021-11-29 DIAGNOSIS — K52.9 NONINFECTIVE GASTROENTERITIS AND COLITIS, UNSPECIFIED: ICD-10-CM

## 2021-11-29 DIAGNOSIS — R07.81 PLEURODYNIA: ICD-10-CM

## 2021-11-29 DIAGNOSIS — F32.9 MAJOR DEPRESSIVE DISORDER, SINGLE EPISODE, UNSPECIFIED: ICD-10-CM

## 2021-11-29 DIAGNOSIS — Z88.8 ALLERGY STATUS TO OTHER DRUGS, MEDICAMENTS AND BIOLOGICAL SUBSTANCES STATUS: ICD-10-CM

## 2021-11-29 DIAGNOSIS — Z88.0 ALLERGY STATUS TO PENICILLIN: ICD-10-CM

## 2021-11-29 DIAGNOSIS — K21.9 GASTRO-ESOPHAGEAL REFLUX DISEASE WITHOUT ESOPHAGITIS: ICD-10-CM

## 2021-11-29 DIAGNOSIS — E10.9 TYPE 1 DIABETES MELLITUS WITHOUT COMPLICATIONS: ICD-10-CM

## 2021-11-29 DIAGNOSIS — Z88.7 ALLERGY STATUS TO SERUM AND VACCINE: ICD-10-CM

## 2021-11-29 DIAGNOSIS — Z88.1 ALLERGY STATUS TO OTHER ANTIBIOTIC AGENTS STATUS: ICD-10-CM

## 2021-11-29 LAB
ALBUMIN SERPL ELPH-MCNC: 4.6 G/DL — SIGNIFICANT CHANGE UP (ref 3.5–5.2)
ALP SERPL-CCNC: 134 U/L — HIGH (ref 30–115)
ALT FLD-CCNC: 135 U/L — HIGH (ref 0–41)
ANION GAP SERPL CALC-SCNC: 14 MMOL/L — SIGNIFICANT CHANGE UP (ref 7–14)
APPEARANCE UR: CLEAR — SIGNIFICANT CHANGE UP
AST SERPL-CCNC: 108 U/L — HIGH (ref 0–41)
B-OH-BUTYR SERPL-SCNC: 0.8 MMOL/L — HIGH
BASOPHILS # BLD AUTO: 0.05 K/UL — SIGNIFICANT CHANGE UP (ref 0–0.2)
BASOPHILS NFR BLD AUTO: 0.4 % — SIGNIFICANT CHANGE UP (ref 0–1)
BILIRUB SERPL-MCNC: 0.4 MG/DL — SIGNIFICANT CHANGE UP (ref 0.2–1.2)
BILIRUB UR-MCNC: ABNORMAL
BUN SERPL-MCNC: 7 MG/DL — LOW (ref 10–20)
CALCIUM SERPL-MCNC: 9.7 MG/DL — SIGNIFICANT CHANGE UP (ref 8.5–10.1)
CHLORIDE SERPL-SCNC: 98 MMOL/L — SIGNIFICANT CHANGE UP (ref 98–110)
CO2 SERPL-SCNC: 21 MMOL/L — SIGNIFICANT CHANGE UP (ref 17–32)
COLOR SPEC: YELLOW — SIGNIFICANT CHANGE UP
CREAT SERPL-MCNC: 0.6 MG/DL — LOW (ref 0.7–1.5)
D DIMER BLD IA.RAPID-MCNC: 124 NG/ML DDU — SIGNIFICANT CHANGE UP (ref 0–230)
DIFF PNL FLD: NEGATIVE — SIGNIFICANT CHANGE UP
EOSINOPHIL # BLD AUTO: 0.04 K/UL — SIGNIFICANT CHANGE UP (ref 0–0.7)
EOSINOPHIL NFR BLD AUTO: 0.3 % — SIGNIFICANT CHANGE UP (ref 0–8)
GLUCOSE SERPL-MCNC: 301 MG/DL — HIGH (ref 70–99)
GLUCOSE UR QL: >=1000
HCG SERPL QL: NEGATIVE — SIGNIFICANT CHANGE UP
HCT VFR BLD CALC: 40.5 % — SIGNIFICANT CHANGE UP (ref 37–47)
HGB BLD-MCNC: 12.8 G/DL — SIGNIFICANT CHANGE UP (ref 12–16)
IMM GRANULOCYTES NFR BLD AUTO: 0.3 % — SIGNIFICANT CHANGE UP (ref 0.1–0.3)
KETONES UR-MCNC: 80 — SIGNIFICANT CHANGE UP
LACTATE SERPL-SCNC: 1.3 MMOL/L — SIGNIFICANT CHANGE UP (ref 0.7–2)
LEUKOCYTE ESTERASE UR-ACNC: NEGATIVE — SIGNIFICANT CHANGE UP
LIDOCAIN IGE QN: 18 U/L — SIGNIFICANT CHANGE UP (ref 7–60)
LYMPHOCYTES # BLD AUTO: 1.42 K/UL — SIGNIFICANT CHANGE UP (ref 1.2–3.4)
LYMPHOCYTES # BLD AUTO: 12.2 % — LOW (ref 20.5–51.1)
MAGNESIUM SERPL-MCNC: 2.1 MG/DL — SIGNIFICANT CHANGE UP (ref 1.8–2.4)
MCHC RBC-ENTMCNC: 27.4 PG — SIGNIFICANT CHANGE UP (ref 27–31)
MCHC RBC-ENTMCNC: 31.6 G/DL — LOW (ref 32–37)
MCV RBC AUTO: 86.7 FL — SIGNIFICANT CHANGE UP (ref 81–99)
MONOCYTES # BLD AUTO: 0.33 K/UL — SIGNIFICANT CHANGE UP (ref 0.1–0.6)
MONOCYTES NFR BLD AUTO: 2.8 % — SIGNIFICANT CHANGE UP (ref 1.7–9.3)
NEUTROPHILS # BLD AUTO: 9.73 K/UL — HIGH (ref 1.4–6.5)
NEUTROPHILS NFR BLD AUTO: 84 % — HIGH (ref 42.2–75.2)
NITRITE UR-MCNC: NEGATIVE — SIGNIFICANT CHANGE UP
NRBC # BLD: 0 /100 WBCS — SIGNIFICANT CHANGE UP (ref 0–0)
NT-PROBNP SERPL-SCNC: 31 PG/ML — SIGNIFICANT CHANGE UP (ref 0–300)
PH UR: 5 — SIGNIFICANT CHANGE UP (ref 5–8)
PLATELET # BLD AUTO: 400 K/UL — SIGNIFICANT CHANGE UP (ref 130–400)
POTASSIUM SERPL-MCNC: 7.3 MMOL/L — CRITICAL HIGH (ref 3.5–5)
POTASSIUM SERPL-SCNC: 7.3 MMOL/L — CRITICAL HIGH (ref 3.5–5)
PROT SERPL-MCNC: 8.2 G/DL — HIGH (ref 6–8)
PROT UR-MCNC: NEGATIVE — SIGNIFICANT CHANGE UP
RBC # BLD: 4.67 M/UL — SIGNIFICANT CHANGE UP (ref 4.2–5.4)
RBC # FLD: 13.8 % — SIGNIFICANT CHANGE UP (ref 11.5–14.5)
SARS-COV-2 RNA SPEC QL NAA+PROBE: SIGNIFICANT CHANGE UP
SODIUM SERPL-SCNC: 133 MMOL/L — LOW (ref 135–146)
SP GR SPEC: 1.02 — SIGNIFICANT CHANGE UP (ref 1.01–1.03)
TROPONIN T SERPL-MCNC: <0.01 NG/ML — SIGNIFICANT CHANGE UP
UROBILINOGEN FLD QL: 0.2 — SIGNIFICANT CHANGE UP
WBC # BLD: 11.61 K/UL — HIGH (ref 4.8–10.8)
WBC # FLD AUTO: 11.61 K/UL — HIGH (ref 4.8–10.8)

## 2021-11-29 PROCEDURE — 93010 ELECTROCARDIOGRAM REPORT: CPT

## 2021-11-29 PROCEDURE — 93010 ELECTROCARDIOGRAM REPORT: CPT | Mod: 76

## 2021-11-29 PROCEDURE — 71045 X-RAY EXAM CHEST 1 VIEW: CPT | Mod: 26

## 2021-11-29 PROCEDURE — 74177 CT ABD & PELVIS W/CONTRAST: CPT | Mod: 26,MA

## 2021-11-29 PROCEDURE — 99285 EMERGENCY DEPT VISIT HI MDM: CPT

## 2021-11-29 RX ORDER — SODIUM CHLORIDE 9 MG/ML
1000 INJECTION INTRAMUSCULAR; INTRAVENOUS; SUBCUTANEOUS ONCE
Refills: 0 | Status: COMPLETED | OUTPATIENT
Start: 2021-11-29 | End: 2021-11-29

## 2021-11-29 RX ORDER — METRONIDAZOLE 500 MG
1 TABLET ORAL
Qty: 20 | Refills: 0
Start: 2021-11-29 | End: 2021-12-08

## 2021-11-29 RX ORDER — KETOROLAC TROMETHAMINE 30 MG/ML
15 SYRINGE (ML) INJECTION ONCE
Refills: 0 | Status: DISCONTINUED | OUTPATIENT
Start: 2021-11-29 | End: 2021-11-29

## 2021-11-29 RX ORDER — SODIUM CHLORIDE 9 MG/ML
1000 INJECTION INTRAMUSCULAR; INTRAVENOUS; SUBCUTANEOUS ONCE
Refills: 0 | Status: DISCONTINUED | OUTPATIENT
Start: 2021-11-29 | End: 2021-11-29

## 2021-11-29 RX ORDER — HYDROXYZINE HCL 10 MG
50 TABLET ORAL ONCE
Refills: 0 | Status: COMPLETED | OUTPATIENT
Start: 2021-11-29 | End: 2021-11-29

## 2021-11-29 RX ORDER — ACETAMINOPHEN 500 MG
650 TABLET ORAL ONCE
Refills: 0 | Status: COMPLETED | OUTPATIENT
Start: 2021-11-29 | End: 2021-11-29

## 2021-11-29 RX ADMIN — Medication 50 MILLIGRAM(S): at 10:31

## 2021-11-29 RX ADMIN — Medication 650 MILLIGRAM(S): at 12:31

## 2021-11-29 RX ADMIN — SODIUM CHLORIDE 1000 MILLILITER(S): 9 INJECTION INTRAMUSCULAR; INTRAVENOUS; SUBCUTANEOUS at 10:31

## 2021-11-29 RX ADMIN — Medication 15 MILLIGRAM(S): at 15:12

## 2021-11-29 NOTE — ED PROVIDER NOTE - PHYSICAL EXAMINATION
CONST: Well appearing in NAD  EYES:  Sclera and conjunctiva clear.  CARD: tachycardic, regular.   RESP: Equal BS B/L, No wheezes, rhonchi or rales. No distress  GI: Soft, non-tender, non-distended.  MS: Normal ROM in all extremities. No edema of lower extremities, no calf pain, radial pulses 2+ bilaterally  SKIN: Warm, dry, no acute rashes. Good turgor  NEURO: A&Ox3, No focal deficits. Strength 5/5 with no sensory deficits

## 2021-11-29 NOTE — ED PROVIDER NOTE - PROGRESS NOTE DETAILS
pt developed abd pain.  ct shows colitis.  will prescribe abx.  multiple drug allergies.  tolerating po in the ED.  Discussed case w/ sukhjinder cardio.  clear from cardiac standpoint. Discussed results with pt.  All questions were answered and return precautions discussed.  Pt is asx and comfortable at this time.  Unremarkable re-exam.  No further concerns at this time from pt.  Will follow up with PMD, cardio and GI.  Pt understands and agrees with tx plan.

## 2021-11-29 NOTE — ED PROVIDER NOTE - NS ED ROS FT
Constitutional: See HPI.  Eyes: No visual changes, eye pain or discharge.   ENMT: No hearing changes, pain, discharge or infections.  Cardiac: + chest pain. No SOB or edema. No chest pain with exertion.  Respiratory: No cough or respiratory distress. No hemoptysis  GI: No nausea, vomiting, diarrhea or abdominal pain.  : No dysuria, frequency or burning. No Discharge  MS: No myalgia, muscle weakness, joint pain or back pain.  Neuro: No headache or weakness.   Skin: No skin rash.  Except as documented in the HPI, all other systems are negative.

## 2021-11-29 NOTE — ED PROVIDER NOTE - ATTENDING CONTRIBUTION TO CARE
c/o chest pain.  VS noted.  Chest clear.  Heart RR no murmur.  abd NT.  Ext FROM.  =pulses.  dx testing reviewed.  close f/u arranged.

## 2021-11-29 NOTE — ED PROVIDER NOTE - PATIENT PORTAL LINK FT
You can access the FollowMyHealth Patient Portal offered by [facility] by registering at the following website: [email or web address]. By joining [employer]’s FollowMyHealth portal, you will also be able to view your health information using other applications (apps) compatible with our system.

## 2021-11-29 NOTE — ED PROVIDER NOTE - OBJECTIVE STATEMENT
36 y.o female w/ hx of DM, GERD presents to the ED for evaluation of chest pain.  Midsternal, pleuritic, mild severity, no radiation of pain. Recent stress test on Friday w/ Dr. Hopper. No fever, chills, abd pain, cough, edema of lower extremities, calf pain, hemoptysis.

## 2021-11-29 NOTE — ED ADULT NURSE NOTE - NS ED NURSE RECORD ANOTHER HT AND WT
Continue Regimen: Sun Screen daily with SPF of 30 or higher with re -application every 2 hours.  Wear sun protective clothing.
Plan: If areas continue to be of concern may consider laser treatment.
Detail Level: Zone
Yes

## 2021-12-02 ENCOUNTER — EMERGENCY (EMERGENCY)
Facility: HOSPITAL | Age: 36
LOS: 0 days | Discharge: HOME | End: 2021-12-02
Attending: EMERGENCY MEDICINE | Admitting: EMERGENCY MEDICINE
Payer: MEDICAID

## 2021-12-02 VITALS
TEMPERATURE: 98 F | HEART RATE: 112 BPM | WEIGHT: 175.05 LBS | DIASTOLIC BLOOD PRESSURE: 81 MMHG | RESPIRATION RATE: 17 BRPM | SYSTOLIC BLOOD PRESSURE: 143 MMHG | HEIGHT: 64 IN | OXYGEN SATURATION: 99 %

## 2021-12-02 VITALS
RESPIRATION RATE: 18 BRPM | DIASTOLIC BLOOD PRESSURE: 78 MMHG | SYSTOLIC BLOOD PRESSURE: 135 MMHG | OXYGEN SATURATION: 99 % | HEART RATE: 82 BPM

## 2021-12-02 DIAGNOSIS — Z88.1 ALLERGY STATUS TO OTHER ANTIBIOTIC AGENTS STATUS: ICD-10-CM

## 2021-12-02 DIAGNOSIS — E10.9 TYPE 1 DIABETES MELLITUS WITHOUT COMPLICATIONS: ICD-10-CM

## 2021-12-02 DIAGNOSIS — R10.9 UNSPECIFIED ABDOMINAL PAIN: ICD-10-CM

## 2021-12-02 DIAGNOSIS — R19.7 DIARRHEA, UNSPECIFIED: ICD-10-CM

## 2021-12-02 DIAGNOSIS — Z79.82 LONG TERM (CURRENT) USE OF ASPIRIN: ICD-10-CM

## 2021-12-02 DIAGNOSIS — R11.0 NAUSEA: ICD-10-CM

## 2021-12-02 DIAGNOSIS — Z88.0 ALLERGY STATUS TO PENICILLIN: ICD-10-CM

## 2021-12-02 DIAGNOSIS — Z88.7 ALLERGY STATUS TO SERUM AND VACCINE: ICD-10-CM

## 2021-12-02 DIAGNOSIS — Z88.8 ALLERGY STATUS TO OTHER DRUGS, MEDICAMENTS AND BIOLOGICAL SUBSTANCES STATUS: ICD-10-CM

## 2021-12-02 LAB
ALBUMIN SERPL ELPH-MCNC: 4.2 G/DL — SIGNIFICANT CHANGE UP (ref 3.5–5.2)
ALP SERPL-CCNC: 94 U/L — SIGNIFICANT CHANGE UP (ref 30–115)
ALT FLD-CCNC: 144 U/L — HIGH (ref 0–41)
ANION GAP SERPL CALC-SCNC: 16 MMOL/L — HIGH (ref 7–14)
APPEARANCE UR: CLEAR — SIGNIFICANT CHANGE UP
AST SERPL-CCNC: 79 U/L — HIGH (ref 0–41)
B-OH-BUTYR SERPL-SCNC: 1 MMOL/L — HIGH
BASOPHILS # BLD AUTO: 0.03 K/UL — SIGNIFICANT CHANGE UP (ref 0–0.2)
BASOPHILS NFR BLD AUTO: 0.3 % — SIGNIFICANT CHANGE UP (ref 0–1)
BILIRUB DIRECT SERPL-MCNC: <0.2 MG/DL — SIGNIFICANT CHANGE UP (ref 0–0.3)
BILIRUB INDIRECT FLD-MCNC: >0.1 MG/DL — LOW (ref 0.2–1.2)
BILIRUB SERPL-MCNC: 0.3 MG/DL — SIGNIFICANT CHANGE UP (ref 0.2–1.2)
BILIRUB UR-MCNC: NEGATIVE — SIGNIFICANT CHANGE UP
BUN SERPL-MCNC: 6 MG/DL — LOW (ref 10–20)
CALCIUM SERPL-MCNC: 9.2 MG/DL — SIGNIFICANT CHANGE UP (ref 8.5–10.1)
CHLORIDE SERPL-SCNC: 100 MMOL/L — SIGNIFICANT CHANGE UP (ref 98–110)
CO2 SERPL-SCNC: 21 MMOL/L — SIGNIFICANT CHANGE UP (ref 17–32)
COLOR SPEC: YELLOW — SIGNIFICANT CHANGE UP
CREAT SERPL-MCNC: 0.5 MG/DL — LOW (ref 0.7–1.5)
DIFF PNL FLD: NEGATIVE — SIGNIFICANT CHANGE UP
EOSINOPHIL # BLD AUTO: 0.02 K/UL — SIGNIFICANT CHANGE UP (ref 0–0.7)
EOSINOPHIL NFR BLD AUTO: 0.2 % — SIGNIFICANT CHANGE UP (ref 0–8)
GLUCOSE SERPL-MCNC: 152 MG/DL — HIGH (ref 70–99)
GLUCOSE UR QL: ABNORMAL
HCG SERPL QL: NEGATIVE — SIGNIFICANT CHANGE UP
HCT VFR BLD CALC: 35.7 % — LOW (ref 37–47)
HGB BLD-MCNC: 11.3 G/DL — LOW (ref 12–16)
IMM GRANULOCYTES NFR BLD AUTO: 0.6 % — HIGH (ref 0.1–0.3)
KETONES UR-MCNC: ABNORMAL
LACTATE SERPL-SCNC: 1.1 MMOL/L — SIGNIFICANT CHANGE UP (ref 0.7–2)
LEUKOCYTE ESTERASE UR-ACNC: NEGATIVE — SIGNIFICANT CHANGE UP
LIDOCAIN IGE QN: 8 U/L — SIGNIFICANT CHANGE UP (ref 7–60)
LYMPHOCYTES # BLD AUTO: 1.41 K/UL — SIGNIFICANT CHANGE UP (ref 1.2–3.4)
LYMPHOCYTES # BLD AUTO: 14.5 % — LOW (ref 20.5–51.1)
MCHC RBC-ENTMCNC: 27.6 PG — SIGNIFICANT CHANGE UP (ref 27–31)
MCHC RBC-ENTMCNC: 31.7 G/DL — LOW (ref 32–37)
MCV RBC AUTO: 87.1 FL — SIGNIFICANT CHANGE UP (ref 81–99)
MONOCYTES # BLD AUTO: 0.45 K/UL — SIGNIFICANT CHANGE UP (ref 0.1–0.6)
MONOCYTES NFR BLD AUTO: 4.6 % — SIGNIFICANT CHANGE UP (ref 1.7–9.3)
NEUTROPHILS # BLD AUTO: 7.77 K/UL — HIGH (ref 1.4–6.5)
NEUTROPHILS NFR BLD AUTO: 79.8 % — HIGH (ref 42.2–75.2)
NITRITE UR-MCNC: NEGATIVE — SIGNIFICANT CHANGE UP
NRBC # BLD: 0 /100 WBCS — SIGNIFICANT CHANGE UP (ref 0–0)
PH UR: 6.5 — SIGNIFICANT CHANGE UP (ref 5–8)
PLATELET # BLD AUTO: 407 K/UL — HIGH (ref 130–400)
POTASSIUM SERPL-MCNC: 4.1 MMOL/L — SIGNIFICANT CHANGE UP (ref 3.5–5)
POTASSIUM SERPL-SCNC: 4.1 MMOL/L — SIGNIFICANT CHANGE UP (ref 3.5–5)
PROT SERPL-MCNC: 7 G/DL — SIGNIFICANT CHANGE UP (ref 6–8)
PROT UR-MCNC: SIGNIFICANT CHANGE UP
RBC # BLD: 4.1 M/UL — LOW (ref 4.2–5.4)
RBC # FLD: 14 % — SIGNIFICANT CHANGE UP (ref 11.5–14.5)
SODIUM SERPL-SCNC: 137 MMOL/L — SIGNIFICANT CHANGE UP (ref 135–146)
SP GR SPEC: 1.02 — SIGNIFICANT CHANGE UP (ref 1.01–1.03)
UROBILINOGEN FLD QL: SIGNIFICANT CHANGE UP
WBC # BLD: 9.74 K/UL — SIGNIFICANT CHANGE UP (ref 4.8–10.8)
WBC # FLD AUTO: 9.74 K/UL — SIGNIFICANT CHANGE UP (ref 4.8–10.8)

## 2021-12-02 PROCEDURE — 99285 EMERGENCY DEPT VISIT HI MDM: CPT

## 2021-12-02 PROCEDURE — 74177 CT ABD & PELVIS W/CONTRAST: CPT | Mod: 26,MA

## 2021-12-02 RX ORDER — SODIUM CHLORIDE 9 MG/ML
1000 INJECTION INTRAMUSCULAR; INTRAVENOUS; SUBCUTANEOUS ONCE
Refills: 0 | Status: DISCONTINUED | OUTPATIENT
Start: 2021-12-02 | End: 2021-12-02

## 2021-12-02 RX ADMIN — Medication 20 MILLIGRAM(S): at 13:44

## 2021-12-02 NOTE — ED PROVIDER NOTE - PHYSICAL EXAMINATION
CONSTITUTIONAL: Well-developed; well-nourished; in no acute distress, nontoxic appearing  SKIN: skin exam is warm and dry  ENT: MMM    CARD: S1, S2 normal, no murmur  RESP:  Good air movement bilaterally  ABD: soft; non-distended; non-tender. No Rebound, No guarding  EXT: Normal ROM.    NEURO: awake, alert, following commands, oriented, grossly unremarkable. No Focal deficits. GCS 15.   PSYCH: Cooperative, appropriate.

## 2021-12-02 NOTE — ED PROVIDER NOTE - ATTENDING CONTRIBUTION TO CARE
35 y/o female h/o DM, denies PSH, non-smoker p/w lower abdominal pain x 11/28, persistent, denies radiation / modifying factors, + nausea and loose stools (w/blood streaks), passing flatus, denies fever, v/d, anorexia, cough, respiratory sx, urinary sx, vaginal d/c or other associated complaints at present. Seen 11/29 for similar complaints, dx w/colitis and on PO flagyl.    Old chart reviewed.  I have reviewed and agree with the initial nursing note, except as documented in my note.    VSS, awake, alert, no scleral icterus, oropharynx clear, mmm, no jaundice, skin rash or lesions, chest CTAB, non-labored breathing, no w/r/r, +S1/S2, RRR, no m/r/g, abdomen soft, mild ttp lower abdomen w/o peritoneal signs, ND, +BS, no hernias or distention, no pulsatile masses or bruits appreciated, no CVA tenderness, no peripheral edema or deformities, alert, clear speech and steady gait.

## 2021-12-02 NOTE — ED ADULT NURSE NOTE - OBJECTIVE STATEMENT
patient c/o abdominal pain with n/v for 3 days. denies any fevers patient c/o abdominal pain with n/v for 3 days. denies any fevers. states she was at [facility] ED the other day and told she has colitis

## 2021-12-02 NOTE — ED PROVIDER NOTE - PATIENT PORTAL LINK FT
You can access the FollowMyHealth Patient Portal offered by [facility] by registering at the following website: [email or web address]. By joining [employer]’s FollowMyHealth portal, you will also be able to view your health information using other applications (apps) compatible with our system.

## 2021-12-02 NOTE — ED PROVIDER NOTE - NS ED ROS FT
Review of Systems:  	•	CONSTITUTIONAL: no fever, no chills  	•	SKIN: no rash   	•	ENT: no sore throat   	•	RESPIRATORY: no shortness of breath, no cough  	•	CARDIAC: no chest pain, no palpitations  	•	GI: +abd pain, no nausea, no vomiting, +diarrhea   	•	GENITO-URINARY: no discharge, no dysuria; no hematuria, no increased urinary frequency  	•	MUSCULOSKELETAL: no joint paint, no swelling, no redness  	•	NEUROLOGIC: no weakness, no headache, no syncope  	•	PSYCH: no anxiety, non suicidal, non homicidal, no hallucination, no depression

## 2021-12-02 NOTE — ED PROVIDER NOTE - OBJECTIVE STATEMENT
36 year old female, past medical history 36 year old female, past medical history dm, htn, chronic back pain, who presents with abd pain and diarrhea. patient with recent presentation to ED for similar, diagnosed with colitis and started on flagyl. patient reports persistent symptoms prompting eval. denies fever, chills, sore throat, nausea/vomiting, back pain, urinary symptoms. patient has been compliant with antibiotics.

## 2021-12-02 NOTE — ED PROVIDER NOTE - CARE PROVIDER_API CALL
Oskar John (DO)  Medicine  [facility]  [address], [address] [place] [address]  Phone: [phone]  Fax: [phone]  Follow Up Time: Routine

## 2021-12-02 NOTE — ED PROVIDER NOTE - PROGRESS NOTE DETAILS
ck: patient reports moderate improvement of symptoms, rpt abd exam non-tender, no rebound/guarding. tolerating po. will give gi fu BH: Patient's labs, UA, CT WNL, no acute intra-abdominal process identified at this time, abdomen soft, non-tender. No indication for admission or further emergent evaluation at this time. Was explained that the source of their symptoms is unclear, possible due to resolving colitis and that the patient should still follow up with their primary physician or gastroenterology for further evaluation and management. These elements were discussed with the patient and they are amenable to outpatient follow-up at this time.    The patient was given detailed return precautions and advised to return to the emergency department in 2-3 days if not improving or sooner if any new symptoms develop, symptoms worsened or for any concerns. The patient was offered the opportunity to ask questions and verbalized that they understand the diagnosis and discharge instructions.

## 2021-12-03 ENCOUNTER — NON-APPOINTMENT (OUTPATIENT)
Age: 36
End: 2021-12-03

## 2021-12-04 LAB
CULTURE RESULTS: SIGNIFICANT CHANGE UP
SPECIMEN SOURCE: SIGNIFICANT CHANGE UP

## 2021-12-09 ENCOUNTER — APPOINTMENT (OUTPATIENT)
Dept: PSYCHIATRY | Facility: CLINIC | Age: 36
End: 2021-12-09

## 2021-12-09 ENCOUNTER — OUTPATIENT (OUTPATIENT)
Dept: OUTPATIENT SERVICES | Facility: HOSPITAL | Age: 36
LOS: 1 days | Discharge: HOME | End: 2021-12-09

## 2021-12-09 DIAGNOSIS — F41.9 ANXIETY DISORDER, UNSPECIFIED: ICD-10-CM

## 2021-12-10 ENCOUNTER — APPOINTMENT (OUTPATIENT)
Dept: GASTROENTEROLOGY | Facility: CLINIC | Age: 36
End: 2021-12-10
Payer: MEDICAID

## 2021-12-10 VITALS — HEIGHT: 64 IN | WEIGHT: 172 LBS | TEMPERATURE: 98.2 F | BODY MASS INDEX: 29.37 KG/M2

## 2021-12-10 DIAGNOSIS — K92.1 MELENA: ICD-10-CM

## 2021-12-10 DIAGNOSIS — R11.0 NAUSEA: ICD-10-CM

## 2021-12-10 DIAGNOSIS — R19.7 DIARRHEA, UNSPECIFIED: ICD-10-CM

## 2021-12-10 DIAGNOSIS — K52.9 NONINFECTIVE GASTROENTERITIS AND COLITIS, UNSPECIFIED: ICD-10-CM

## 2021-12-10 PROCEDURE — 99214 OFFICE O/P EST MOD 30 MIN: CPT

## 2021-12-10 PROCEDURE — 99204 OFFICE O/P NEW MOD 45 MIN: CPT

## 2021-12-10 RX ORDER — LANCETS 33 GAUGE
EACH MISCELLANEOUS
Qty: 1 | Refills: 3 | Status: DISCONTINUED | COMMUNITY
Start: 2021-04-09 | End: 2021-12-10

## 2021-12-10 RX ORDER — CLOTRIMAZOLE AND BETAMETHASONE DIPROPIONATE 10; .5 MG/G; MG/G
1-0.05 CREAM TOPICAL TWICE DAILY
Qty: 1 | Refills: 0 | Status: DISCONTINUED | COMMUNITY
Start: 2021-05-06 | End: 2021-12-10

## 2021-12-10 NOTE — HISTORY OF PRESENT ILLNESS
[de-identified] : Patient is a 36-year-old female with diabetes mellitus hypertension and chronic GERD who was recently in the ED on 11/29/2021 with the complaints of abdominal pain and diarrhea. CAT scan done at that time showed evidence of colitis. The patient was started on antibiotics Cipro and Flagyl and her symptoms have resolved. She no longer has any diarrhea and only has minimal abdominal pain.She also states that she noticed that there was blood in the stools when she was having diarrhea. Her appetite is good she gets reflux symptoms once or twice a week which is well controlled with Pepcid . She has never had an endoscopy.

## 2021-12-10 NOTE — PHYSICAL EXAM
[General Appearance - Alert] : alert [General Appearance - Well Nourished] : well nourished [Sclera] : the sclera and conjunctiva were normal [Outer Ear] : the ears and nose were normal in appearance [Neck Appearance] : the appearance of the neck was normal [] : no respiratory distress [Exaggerated Use Of Accessory Muscles For Inspiration] : no accessory muscle use [Auscultation Breath Sounds / Voice Sounds] : lungs were clear to auscultation bilaterally [Apical Impulse] : the apical impulse was normal [Heart Sounds] : normal S1 and S2 [Murmurs] : no murmurs [Bowel Sounds] : normal bowel sounds [Abdomen Tenderness] : non-tender [Abdomen Mass (___ Cm)] : no abdominal mass palpated [FreeTextEntry1] : mild tenderness.

## 2021-12-10 NOTE — ASSESSMENT
[FreeTextEntry1] : Patient's lab work reviewed hemoglobin on 12/2/21 was 11.3. LFTs were elevated and AST of 79 and an ALT of 144. The LFTs were normal at the beginning of November.\par CAT scan done on 11/29 and 12/ 2 both indicated a questionable lesion in liver.\par Problem #1 acute colitis symptoms have resolved. Although there was blood in the stools it appears to be a case of acute self-limited colitis will not recommend colonoscopy at that time however if symptoms recur will consider colonoscopy.\par Problem 2 abnormal LFTs recommend repeat LFTs and check acute hepatitis panel.\par Problem 3 abnormal CAT scan showing lesion in the liver advised to outline the lesions better with an MRI MRI ordered.\par Problem 4 GERD patient well controlled with Pepcid once a day diet advised to continue Pepcid.

## 2021-12-13 ENCOUNTER — OUTPATIENT (OUTPATIENT)
Dept: OUTPATIENT SERVICES | Facility: HOSPITAL | Age: 36
LOS: 1 days | Discharge: HOME | End: 2021-12-13

## 2021-12-13 ENCOUNTER — APPOINTMENT (OUTPATIENT)
Dept: PODIATRY | Facility: CLINIC | Age: 36
End: 2021-12-13
Payer: MEDICAID

## 2021-12-13 VITALS
BODY MASS INDEX: 28.85 KG/M2 | DIASTOLIC BLOOD PRESSURE: 86 MMHG | SYSTOLIC BLOOD PRESSURE: 136 MMHG | HEIGHT: 64 IN | HEART RATE: 111 BPM | WEIGHT: 169 LBS | OXYGEN SATURATION: 99 %

## 2021-12-13 DIAGNOSIS — L85.3 XEROSIS CUTIS: ICD-10-CM

## 2021-12-13 DIAGNOSIS — G62.9 POLYNEUROPATHY, UNSPECIFIED: ICD-10-CM

## 2021-12-13 DIAGNOSIS — M79.671 PAIN IN RIGHT FOOT: ICD-10-CM

## 2021-12-13 DIAGNOSIS — L84 CORNS AND CALLOSITIES: ICD-10-CM

## 2021-12-13 DIAGNOSIS — M79.672 PAIN IN LEFT FOOT: ICD-10-CM

## 2021-12-13 DIAGNOSIS — E11.8 TYPE 2 DIABETES MELLITUS WITH UNSPECIFIED COMPLICATIONS: ICD-10-CM

## 2021-12-13 DIAGNOSIS — G89.29 PAIN IN RIGHT FOOT: ICD-10-CM

## 2021-12-13 DIAGNOSIS — L60.3 NAIL DYSTROPHY: ICD-10-CM

## 2021-12-13 PROCEDURE — 99213 OFFICE O/P EST LOW 20 MIN: CPT

## 2021-12-13 NOTE — ASSESSMENT
[FreeTextEntry1] : Patient examined, history and chart reviewed\par patient educated on diabetic foot health and maintenance at length \par Complete Diabetic foot examination performed\par control glucose \par Follow up in 3 months or PRN\par \par \par \par

## 2021-12-13 NOTE — HISTORY OF PRESENT ILLNESS
[FreeTextEntry1] : CRISTI MONTGOMERY  returns to clinic for a follow-up visit for a routine diabetic foot examination. Pt. states that she will be having her A1C done in February. Patient denies NVFC and denies SOB. Pt. denies any other pedal complaints at this time. \par

## 2021-12-14 ENCOUNTER — APPOINTMENT (OUTPATIENT)
Dept: INTERNAL MEDICINE | Facility: CLINIC | Age: 36
End: 2021-12-14

## 2021-12-16 ENCOUNTER — OUTPATIENT (OUTPATIENT)
Dept: OUTPATIENT SERVICES | Facility: HOSPITAL | Age: 36
LOS: 1 days | Discharge: HOME | End: 2021-12-16
Payer: MEDICAID

## 2021-12-16 ENCOUNTER — RESULT REVIEW (OUTPATIENT)
Age: 36
End: 2021-12-16

## 2021-12-16 DIAGNOSIS — R94.5 ABNORMAL RESULTS OF LIVER FUNCTION STUDIES: ICD-10-CM

## 2021-12-16 PROCEDURE — 74183 MRI ABD W/O CNTR FLWD CNTR: CPT | Mod: 26

## 2021-12-22 ENCOUNTER — EMERGENCY (EMERGENCY)
Facility: HOSPITAL | Age: 36
LOS: 1 days | Discharge: HOME | End: 2021-12-22
Attending: EMERGENCY MEDICINE | Admitting: EMERGENCY MEDICINE
Payer: MEDICAID

## 2021-12-22 ENCOUNTER — NON-APPOINTMENT (OUTPATIENT)
Age: 36
End: 2021-12-22

## 2021-12-22 VITALS
RESPIRATION RATE: 18 BRPM | SYSTOLIC BLOOD PRESSURE: 141 MMHG | OXYGEN SATURATION: 99 % | TEMPERATURE: 97 F | HEIGHT: 64 IN | HEART RATE: 103 BPM | WEIGHT: 171.96 LBS | DIASTOLIC BLOOD PRESSURE: 85 MMHG

## 2021-12-22 VITALS
RESPIRATION RATE: 18 BRPM | HEART RATE: 111 BPM | OXYGEN SATURATION: 99 % | SYSTOLIC BLOOD PRESSURE: 138 MMHG | TEMPERATURE: 98 F | DIASTOLIC BLOOD PRESSURE: 74 MMHG

## 2021-12-22 DIAGNOSIS — Z88.7 ALLERGY STATUS TO SERUM AND VACCINE: ICD-10-CM

## 2021-12-22 DIAGNOSIS — R10.30 LOWER ABDOMINAL PAIN, UNSPECIFIED: ICD-10-CM

## 2021-12-22 DIAGNOSIS — Z88.1 ALLERGY STATUS TO OTHER ANTIBIOTIC AGENTS STATUS: ICD-10-CM

## 2021-12-22 DIAGNOSIS — Z88.8 ALLERGY STATUS TO OTHER DRUGS, MEDICAMENTS AND BIOLOGICAL SUBSTANCES STATUS: ICD-10-CM

## 2021-12-22 DIAGNOSIS — Z88.0 ALLERGY STATUS TO PENICILLIN: ICD-10-CM

## 2021-12-22 DIAGNOSIS — E11.9 TYPE 2 DIABETES MELLITUS WITHOUT COMPLICATIONS: ICD-10-CM

## 2021-12-22 DIAGNOSIS — I10 ESSENTIAL (PRIMARY) HYPERTENSION: ICD-10-CM

## 2021-12-22 PROCEDURE — 74018 RADEX ABDOMEN 1 VIEW: CPT | Mod: 26

## 2021-12-22 PROCEDURE — 99284 EMERGENCY DEPT VISIT MOD MDM: CPT

## 2021-12-22 PROCEDURE — 71045 X-RAY EXAM CHEST 1 VIEW: CPT | Mod: 26

## 2021-12-22 NOTE — ED PROVIDER NOTE - NS ED ROS FT
Constitutional: (-) fever (-) malaise (-) diaphoresis (-) chills (-) wt. loss (-) bodyaches (-) night sweats  Eyes: (-) visual changes (-) eye pain (-) eye discharge (-) photophobia (-) FB sensation  Cardiac: (-) chest pain  (-) palpitations (-) syncope (-) edema  Respiratory: (-) cough (-) SOB (-) LALAN  GI: (-) nausea (-) vomiting (-) diarrhea (+) abdominal pain  : (-) dysuria (-) increased frequency  (-) hematuria (-) incontinence  MS: (-) back pain (-) myalgia (-) muscle weakness (-)  joint pain  Neuro: (-) headache (-) dizziness (-) numbness/tingling to extremities B/L (-) weakness   Skin: (-) rash (-) laceration    Except as documented in the HPI, all other systems are negative.

## 2021-12-22 NOTE — ED PROVIDER NOTE - OBJECTIVE STATEMENT
35 yo F pmhx DM, HTN, chronic back pain BIBEMS from [facility] to the ED for evaluation of intermittent lower abdominal pain, cramping sensation x 1 month. Pt endorsing her FS was 500 today, administer 3.9 units of insulin prior to arrival. Denies any alleviating factors. Pt had CT abd/pelvis on 11/29/21 showing colitis, completed course of abx. Pt had MRI to evaluate a lesion on the liver on 12/16/21. Denies fever, chills, nausea, vomiting, diarrhea, chest pain, sob, dysuria, hematuria.

## 2021-12-22 NOTE — ED PROVIDER NOTE - ATTENDING CONTRIBUTION TO CARE
pt sts her glucose was over 500 at home, when checked by EMS it was 184. pt sts she gave herself 3.9units of insulin. she is having some abdominal cramps. this is going on for 1 month. no n, v, d, fever or chills.  she had a CT a/p 11/29/21 showing colitis. she finished a course of abx. she had another CT on 12/2/21 which was normal. She also had an MRI to evaluate a lesion on the liver on 12/16/21.     pt is well appearing, in nad, smiling, comfortable.  FS is nml in ED.   anicteric, neck sup, ctab, rrr, ab soft, nt, nd. nml bs.

## 2021-12-22 NOTE — ED PROVIDER NOTE - PATIENT PORTAL LINK FT
You can access the FollowMyHealth Patient Portal offered by [facility] by registering at the following website: [email or web address]. By joining [employer]’s FollowMyHealth portal, you will also be able to view your health information using other applications (apps) compatible with our system.

## 2021-12-22 NOTE — ED ADULT NURSE NOTE - CHIEF COMPLAINT QUOTE
BIBA from [facility] with complaints of lower abdominal pain radiating to back and hyperglycemia.

## 2021-12-22 NOTE — ED ADULT NURSE NOTE - PRIMARY CARE PROVIDER
PMD
71M hx NAGI, DM, MO, HTN and right ureteral stone s/p ureteral stent due to ureteral stenosis previously who presents for URS. No recent fevers or chills, difficulty urination.

## 2021-12-22 NOTE — ED PROVIDER NOTE - NSFOLLOWUPINSTRUCTIONS_ED_ALL_ED_FT
**FOLLOW UP WITH YOUR GI DOCTOR WITHIN 1-3 DAYS***    Acute Abdominal Pain    WHAT YOU NEED TO KNOW:    The cause of your abdominal pain may not be found. If a cause is found, treatment will depend on what the cause is.     DISCHARGE INSTRUCTIONS:    Return to the emergency department if:     You vomit blood or cannot stop vomiting.      You have blood in your bowel movement or it looks like tar.       You have bleeding from your rectum.       Your abdomen is larger than usual, more painful, and hard.       You have severe pain in your abdomen.       You stop passing gas and having bowel movements.       You feel weak, dizzy, or faint.    Contact your healthcare provider if:     You have a fever.      You have new signs and symptoms.      Your symptoms do not get better with treatment.       You have questions or concerns about your condition or care.    Medicines may be given to decrease pain, treat an infection, and manage your symptoms. Take your medicine as directed. Call your healthcare provider if you think your medicine is not helping or if you have side effects. Tell him if you are allergic to any medicine. Keep a list of the medicines, vitamins, and herbs you take. Include the amounts, and when and why you take them. Bring the list or the pill bottles to follow-up visits. Carry your medicine list with you in case of an emergency.    Manage your symptoms:     Apply heat on your abdomen for 20 to 30 minutes every 2 hours for as many days as directed. Heat helps decrease pain and muscle spasms.       Manage your stress. Stress may cause abdominal pain. Your healthcare provider may recommend relaxation techniques and deep breathing exercises to help decrease your stress. Your healthcare provider may recommend you talk to someone about your stress or anxiety, such as a counselor or a trusted friend. Get plenty of sleep and exercise regularly.       Limit or do not drink alcohol. Alcohol can make your abdominal pain worse. Ask your healthcare provider if it is safe for you to drink alcohol. Also ask how much is safe for you to drink.       Do not smoke. Nicotine and other chemicals in cigarettes can damage your esophagus and stomach. Ask your healthcare provider for information if you currently smoke and need help to quit. E-cigarettes or smokeless tobacco still contain nicotine. Talk to your healthcare provider before you use these products.     Make changes to the food you eat as directed: Do not eat foods that cause abdominal pain or other symptoms. Eat small meals more often.     Eat more high-fiber foods if you are constipated. High-fiber foods include fruits, vegetables, whole-grain foods, and legumes.       Do not eat foods that cause gas if you have bloating. Examples include broccoli, cabbage, and cauliflower. Do not drink soda or carbonated drinks, because these may also cause gas.       Do not eat foods or drinks that contain sorbitol or fructose if you have diarrhea and bloating. Some examples are fruit juices, candy, jelly, and sugar-free gum.       Do not eat high-fat foods, such as fried foods, cheeseburgers, hot dogs, and desserts.      Limit or do not drink caffeine. Caffeine may make symptoms, such as heart burn or nausea, worse.       Drink plenty of liquids to prevent dehydration from diarrhea or vomiting. Ask your healthcare provider how much liquid to drink each day and which liquids are best for you.     Follow up with your healthcare provider as directed: Write down your questions so you remember to ask them during your visits.       © Copyright [employer] 2019 All illustrations and images included in CareNotes are the copyrighted property of [employer] or [employer] Only

## 2021-12-23 ENCOUNTER — INPATIENT (INPATIENT)
Facility: HOSPITAL | Age: 36
LOS: 19 days | Discharge: HOME | End: 2022-01-12
Attending: INTERNAL MEDICINE | Admitting: INTERNAL MEDICINE
Payer: MEDICAID

## 2021-12-23 VITALS
HEART RATE: 115 BPM | HEIGHT: 64 IN | RESPIRATION RATE: 18 BRPM | OXYGEN SATURATION: 97 % | TEMPERATURE: 98 F | DIASTOLIC BLOOD PRESSURE: 98 MMHG | SYSTOLIC BLOOD PRESSURE: 154 MMHG | WEIGHT: 171.96 LBS

## 2021-12-23 LAB
ALBUMIN SERPL ELPH-MCNC: 4.6 G/DL — SIGNIFICANT CHANGE UP (ref 3.5–5.2)
ALP SERPL-CCNC: 109 U/L — SIGNIFICANT CHANGE UP (ref 30–115)
ALT FLD-CCNC: 108 U/L — HIGH (ref 0–41)
ANION GAP SERPL CALC-SCNC: 16 MMOL/L — HIGH (ref 7–14)
ANION GAP SERPL CALC-SCNC: 18 MMOL/L — HIGH (ref 7–14)
APPEARANCE UR: CLEAR — SIGNIFICANT CHANGE UP
AST SERPL-CCNC: 53 U/L — HIGH (ref 0–41)
B-OH-BUTYR SERPL-SCNC: 1.5 MMOL/L — HIGH
BASE EXCESS BLDV CALC-SCNC: 1.1 MMOL/L — SIGNIFICANT CHANGE UP (ref -2–3)
BASOPHILS # BLD AUTO: 0.06 K/UL — SIGNIFICANT CHANGE UP (ref 0–0.2)
BASOPHILS NFR BLD AUTO: 0.5 % — SIGNIFICANT CHANGE UP (ref 0–1)
BILIRUB SERPL-MCNC: 0.4 MG/DL — SIGNIFICANT CHANGE UP (ref 0.2–1.2)
BILIRUB UR-MCNC: NEGATIVE — SIGNIFICANT CHANGE UP
BUN SERPL-MCNC: 6 MG/DL — LOW (ref 10–20)
BUN SERPL-MCNC: 8 MG/DL — LOW (ref 10–20)
CA-I SERPL-SCNC: 1.26 MMOL/L — SIGNIFICANT CHANGE UP (ref 1.15–1.33)
CALCIUM SERPL-MCNC: 9.3 MG/DL — SIGNIFICANT CHANGE UP (ref 8.5–10.1)
CALCIUM SERPL-MCNC: 9.7 MG/DL — SIGNIFICANT CHANGE UP (ref 8.5–10.1)
CHLORIDE SERPL-SCNC: 97 MMOL/L — LOW (ref 98–110)
CHLORIDE SERPL-SCNC: 98 MMOL/L — SIGNIFICANT CHANGE UP (ref 98–110)
CO2 SERPL-SCNC: 19 MMOL/L — SIGNIFICANT CHANGE UP (ref 17–32)
CO2 SERPL-SCNC: 20 MMOL/L — SIGNIFICANT CHANGE UP (ref 17–32)
COLOR SPEC: YELLOW — SIGNIFICANT CHANGE UP
CREAT SERPL-MCNC: 0.5 MG/DL — LOW (ref 0.7–1.5)
CREAT SERPL-MCNC: 0.6 MG/DL — LOW (ref 0.7–1.5)
DIFF PNL FLD: NEGATIVE — SIGNIFICANT CHANGE UP
EOSINOPHIL # BLD AUTO: 0.01 K/UL — SIGNIFICANT CHANGE UP (ref 0–0.7)
EOSINOPHIL NFR BLD AUTO: 0.1 % — SIGNIFICANT CHANGE UP (ref 0–8)
ERYTHROCYTE [SEDIMENTATION RATE] IN BLOOD: 10 MM/HR — SIGNIFICANT CHANGE UP (ref 0–20)
ETHANOL SERPL-MCNC: <10 MG/DL — SIGNIFICANT CHANGE UP
GAS PNL BLDV: 134 MMOL/L — LOW (ref 136–145)
GAS PNL BLDV: SIGNIFICANT CHANGE UP
GGT SERPL-CCNC: 81 U/L — HIGH (ref 1–40)
GLUCOSE BLDC GLUCOMTR-MCNC: 103 MG/DL — HIGH (ref 70–99)
GLUCOSE BLDC GLUCOMTR-MCNC: 159 MG/DL — HIGH (ref 70–99)
GLUCOSE BLDC GLUCOMTR-MCNC: 182 MG/DL — HIGH (ref 70–99)
GLUCOSE BLDC GLUCOMTR-MCNC: 209 MG/DL — HIGH (ref 70–99)
GLUCOSE BLDC GLUCOMTR-MCNC: 293 MG/DL — HIGH (ref 70–99)
GLUCOSE BLDC GLUCOMTR-MCNC: 383 MG/DL — HIGH (ref 70–99)
GLUCOSE BLDC GLUCOMTR-MCNC: 393 MG/DL — HIGH (ref 70–99)
GLUCOSE BLDC GLUCOMTR-MCNC: 443 MG/DL — HIGH (ref 70–99)
GLUCOSE BLDC GLUCOMTR-MCNC: 463 MG/DL — CRITICAL HIGH (ref 70–99)
GLUCOSE SERPL-MCNC: 276 MG/DL — HIGH (ref 70–99)
GLUCOSE SERPL-MCNC: 396 MG/DL — HIGH (ref 70–99)
GLUCOSE UR QL: ABNORMAL
HCG SERPL QL: NEGATIVE — SIGNIFICANT CHANGE UP
HCO3 BLDV-SCNC: 27 MMOL/L — SIGNIFICANT CHANGE UP (ref 22–29)
HCT VFR BLD CALC: 35.5 % — LOW (ref 37–47)
HCT VFR BLDA CALC: 35 % — SIGNIFICANT CHANGE UP (ref 34.5–46.5)
HGB BLD CALC-MCNC: 11.5 G/DL — LOW (ref 11.7–16.1)
HGB BLD-MCNC: 11.4 G/DL — LOW (ref 12–16)
IMM GRANULOCYTES NFR BLD AUTO: 0.3 % — SIGNIFICANT CHANGE UP (ref 0.1–0.3)
IRON SATN MFR SERPL: 14 % — LOW (ref 15–50)
IRON SATN MFR SERPL: 41 UG/DL — SIGNIFICANT CHANGE UP (ref 35–150)
KETONES UR-MCNC: ABNORMAL
LACTATE BLDV-MCNC: 1.4 MMOL/L — SIGNIFICANT CHANGE UP (ref 0.5–2)
LEUKOCYTE ESTERASE UR-ACNC: NEGATIVE — SIGNIFICANT CHANGE UP
LIDOCAIN IGE QN: 10 U/L — SIGNIFICANT CHANGE UP (ref 7–60)
LYMPHOCYTES # BLD AUTO: 1.38 K/UL — SIGNIFICANT CHANGE UP (ref 1.2–3.4)
LYMPHOCYTES # BLD AUTO: 11.3 % — LOW (ref 20.5–51.1)
MAGNESIUM SERPL-MCNC: 1.8 MG/DL — SIGNIFICANT CHANGE UP (ref 1.8–2.4)
MCHC RBC-ENTMCNC: 27.1 PG — SIGNIFICANT CHANGE UP (ref 27–31)
MCHC RBC-ENTMCNC: 32.1 G/DL — SIGNIFICANT CHANGE UP (ref 32–37)
MCV RBC AUTO: 84.3 FL — SIGNIFICANT CHANGE UP (ref 81–99)
MONOCYTES # BLD AUTO: 0.48 K/UL — SIGNIFICANT CHANGE UP (ref 0.1–0.6)
MONOCYTES NFR BLD AUTO: 3.9 % — SIGNIFICANT CHANGE UP (ref 1.7–9.3)
NEUTROPHILS # BLD AUTO: 10.29 K/UL — HIGH (ref 1.4–6.5)
NEUTROPHILS NFR BLD AUTO: 83.9 % — HIGH (ref 42.2–75.2)
NITRITE UR-MCNC: NEGATIVE — SIGNIFICANT CHANGE UP
NRBC # BLD: 0 /100 WBCS — SIGNIFICANT CHANGE UP (ref 0–0)
NT-PROBNP SERPL-SCNC: 21 PG/ML — SIGNIFICANT CHANGE UP (ref 0–300)
PCO2 BLDV: 48 MMHG — HIGH (ref 39–42)
PH BLDV: 7.36 — SIGNIFICANT CHANGE UP (ref 7.32–7.43)
PH UR: 5.5 — SIGNIFICANT CHANGE UP (ref 5–8)
PHOSPHATE SERPL-MCNC: 2.9 MG/DL — SIGNIFICANT CHANGE UP (ref 2.1–4.9)
PLATELET # BLD AUTO: 451 K/UL — HIGH (ref 130–400)
PO2 BLDV: 26 MMHG — SIGNIFICANT CHANGE UP
POTASSIUM BLDV-SCNC: 4.4 MMOL/L — SIGNIFICANT CHANGE UP (ref 3.5–5.1)
POTASSIUM SERPL-MCNC: 4.1 MMOL/L — SIGNIFICANT CHANGE UP (ref 3.5–5)
POTASSIUM SERPL-MCNC: 4.2 MMOL/L — SIGNIFICANT CHANGE UP (ref 3.5–5)
POTASSIUM SERPL-SCNC: 4.1 MMOL/L — SIGNIFICANT CHANGE UP (ref 3.5–5)
POTASSIUM SERPL-SCNC: 4.2 MMOL/L — SIGNIFICANT CHANGE UP (ref 3.5–5)
PROT SERPL-MCNC: 7.8 G/DL — SIGNIFICANT CHANGE UP (ref 6–8)
PROT UR-MCNC: SIGNIFICANT CHANGE UP
RBC # BLD: 4.21 M/UL — SIGNIFICANT CHANGE UP (ref 4.2–5.4)
RBC # FLD: 14.4 % — SIGNIFICANT CHANGE UP (ref 11.5–14.5)
SAO2 % BLDV: 46.1 % — SIGNIFICANT CHANGE UP
SARS-COV-2 RNA SPEC QL NAA+PROBE: SIGNIFICANT CHANGE UP
SODIUM SERPL-SCNC: 133 MMOL/L — LOW (ref 135–146)
SODIUM SERPL-SCNC: 135 MMOL/L — SIGNIFICANT CHANGE UP (ref 135–146)
SP GR SPEC: 1.04 — HIGH (ref 1.01–1.03)
TIBC SERPL-MCNC: 288 UG/DL — SIGNIFICANT CHANGE UP (ref 220–430)
TRANSFERRIN SERPL-MCNC: 243 MG/DL — SIGNIFICANT CHANGE UP (ref 200–360)
TROPONIN T SERPL-MCNC: <0.01 NG/ML — SIGNIFICANT CHANGE UP
TROPONIN T SERPL-MCNC: <0.01 NG/ML — SIGNIFICANT CHANGE UP
UIBC SERPL-MCNC: 247 UG/DL — SIGNIFICANT CHANGE UP (ref 110–370)
UROBILINOGEN FLD QL: SIGNIFICANT CHANGE UP
WBC # BLD: 12.26 K/UL — HIGH (ref 4.8–10.8)
WBC # FLD AUTO: 12.26 K/UL — HIGH (ref 4.8–10.8)

## 2021-12-23 PROCEDURE — 93010 ELECTROCARDIOGRAM REPORT: CPT

## 2021-12-23 PROCEDURE — 99291 CRITICAL CARE FIRST HOUR: CPT

## 2021-12-23 PROCEDURE — 73030 X-RAY EXAM OF SHOULDER: CPT | Mod: 26,LT

## 2021-12-23 PROCEDURE — 99285 EMERGENCY DEPT VISIT HI MDM: CPT

## 2021-12-23 PROCEDURE — 71045 X-RAY EXAM CHEST 1 VIEW: CPT | Mod: 26

## 2021-12-23 RX ORDER — CHLORHEXIDINE GLUCONATE 213 G/1000ML
1 SOLUTION TOPICAL DAILY
Refills: 0 | Status: DISCONTINUED | OUTPATIENT
Start: 2021-12-23 | End: 2022-01-12

## 2021-12-23 RX ORDER — FAMOTIDINE 10 MG/ML
40 INJECTION INTRAVENOUS
Refills: 0 | Status: DISCONTINUED | OUTPATIENT
Start: 2021-12-23 | End: 2022-01-12

## 2021-12-23 RX ORDER — SOD,AMMONIUM,POTASSIUM LACTATE
1 CREAM (GRAM) TOPICAL EVERY 12 HOURS
Refills: 0 | Status: DISCONTINUED | OUTPATIENT
Start: 2021-12-23 | End: 2022-01-12

## 2021-12-23 RX ORDER — AMITRIPTYLINE HCL 25 MG
75 TABLET ORAL AT BEDTIME
Refills: 0 | Status: DISCONTINUED | OUTPATIENT
Start: 2021-12-23 | End: 2022-01-12

## 2021-12-23 RX ORDER — CYCLOBENZAPRINE HYDROCHLORIDE 10 MG/1
10 TABLET, FILM COATED ORAL ONCE
Refills: 0 | Status: COMPLETED | OUTPATIENT
Start: 2021-12-23 | End: 2021-12-23

## 2021-12-23 RX ORDER — METOPROLOL TARTRATE 50 MG
25 TABLET ORAL
Refills: 0 | Status: DISCONTINUED | OUTPATIENT
Start: 2021-12-23 | End: 2022-01-12

## 2021-12-23 RX ORDER — DEXTROSE 50 % IN WATER 50 %
50 SYRINGE (ML) INTRAVENOUS ONCE
Refills: 0 | Status: COMPLETED | OUTPATIENT
Start: 2021-12-23 | End: 2021-12-23

## 2021-12-23 RX ORDER — ASPIRIN/CALCIUM CARB/MAGNESIUM 324 MG
81 TABLET ORAL DAILY
Refills: 0 | Status: DISCONTINUED | OUTPATIENT
Start: 2021-12-23 | End: 2022-01-12

## 2021-12-23 RX ORDER — MULTIVIT-MIN/FERROUS GLUCONATE 9 MG/15 ML
0 LIQUID (ML) ORAL
Qty: 0 | Refills: 0 | DISCHARGE

## 2021-12-23 RX ORDER — INSULIN HUMAN 100 [IU]/ML
6 INJECTION, SOLUTION SUBCUTANEOUS
Qty: 100 | Refills: 0 | Status: DISCONTINUED | OUTPATIENT
Start: 2021-12-23 | End: 2021-12-24

## 2021-12-23 RX ORDER — SODIUM CHLORIDE 9 MG/ML
1000 INJECTION, SOLUTION INTRAVENOUS ONCE
Refills: 0 | Status: COMPLETED | OUTPATIENT
Start: 2021-12-23 | End: 2021-12-23

## 2021-12-23 RX ORDER — DEXTROSE MONOHYDRATE, SODIUM CHLORIDE, AND POTASSIUM CHLORIDE 50; .745; 4.5 G/1000ML; G/1000ML; G/1000ML
1000 INJECTION, SOLUTION INTRAVENOUS
Refills: 0 | Status: DISCONTINUED | OUTPATIENT
Start: 2021-12-23 | End: 2021-12-23

## 2021-12-23 RX ORDER — SODIUM CHLORIDE 9 MG/ML
1000 INJECTION INTRAMUSCULAR; INTRAVENOUS; SUBCUTANEOUS
Refills: 0 | Status: DISCONTINUED | OUTPATIENT
Start: 2021-12-23 | End: 2021-12-24

## 2021-12-23 RX ADMIN — INSULIN HUMAN 4 UNIT(S)/HR: 100 INJECTION, SOLUTION SUBCUTANEOUS at 14:35

## 2021-12-23 RX ADMIN — Medication 25 MILLIGRAM(S): at 23:50

## 2021-12-23 RX ADMIN — Medication 75 MILLIGRAM(S): at 22:56

## 2021-12-23 RX ADMIN — Medication 50 GRAM(S): at 15:44

## 2021-12-23 RX ADMIN — SODIUM CHLORIDE 1000 MILLILITER(S): 9 INJECTION, SOLUTION INTRAVENOUS at 09:15

## 2021-12-23 RX ADMIN — CYCLOBENZAPRINE HYDROCHLORIDE 10 MILLIGRAM(S): 10 TABLET, FILM COATED ORAL at 23:50

## 2021-12-23 RX ADMIN — INSULIN HUMAN 7.8 UNIT(S)/HR: 100 INJECTION, SOLUTION SUBCUTANEOUS at 12:27

## 2021-12-23 RX ADMIN — DEXTROSE MONOHYDRATE, SODIUM CHLORIDE, AND POTASSIUM CHLORIDE 250 MILLILITER(S): 50; .745; 4.5 INJECTION, SOLUTION INTRAVENOUS at 16:41

## 2021-12-23 RX ADMIN — INSULIN HUMAN 6 UNIT(S)/HR: 100 INJECTION, SOLUTION SUBCUTANEOUS at 12:51

## 2021-12-23 NOTE — ED PROVIDER NOTE - PROGRESS NOTE DETAILS
Authored by Dr. Busby: ED CXR prelim: No PTX, No infiltrates, No Free air stacia- called by nurse patient felt light headed getting up from toilet going to bathroom.  patient sat back down and hit left shoulder against wall while going back to sitting down on the toilet.  no LOC.  no N/v. will get left shoulder xray and reeval patient accepted to ICU

## 2021-12-23 NOTE — ED ADULT NURSE REASSESSMENT NOTE - NS ED NURSE REASSESS COMMENT FT1
pt slipped and fell in thew bathroom. denies LOC/ headstrike, pt states she was feeling dizzy. MD [place] at bedside. pt placed back on cardiac monitor. FS checked. close monitoring in progress. safety measures in place

## 2021-12-23 NOTE — ED ADULT TRIAGE NOTE - CHIEF COMPLAINT QUOTE
Patient from [facility] , recently seen for abdominal pain in ED. Presents today c/o worsening abdominal pain Nausea and midsternal CP that is not reproducible or radiating

## 2021-12-23 NOTE — H&P ADULT - NSHPLABSRESULTS_GEN_ALL_CORE
WBC Count: 12.26 K/uL   RBC Count: 4.21 M/uL   Hemoglobin: 11.4 g/dL   Hematocrit: 35.5 %   Mean Cell Volume: 84.3 fL   Mean Cell Hemoglobin: 27.1 pg   Mean Cell Hemoglobin Conc: 32.1 g/dL   Red Cell Distrib Width: 14.4 %   Platelet Count - Automated: 451 K/uL   Auto Neutrophil #: 10.29 K/uL   Auto Lymphocyte #: 1.38 K/uL   Auto Monocyte #: 0.48 K/uL   Auto Eosinophil #: 0.01 K/uL   Auto Basophil #: 0.06 K/uL   Auto Neutrophil %: 83.9: Differential percentages must be correlated with absolute numbers for   clinical significance. %   Auto Lymphocyte %: 11.3 %   Auto Monocyte %: 3.9 %   Auto Eosinophil %: 0.1 %   Auto Basophil %: 0.5 %   Auto Immature Granulocyte %: 0.3: (Includes meta, myelo and promyelocytes) %   Nucleated RBC: 0 /100 WBCs     Sodium, Serum: 133 mmol/L   Potassium, Serum: 4.2 mmol/L   Chloride, Serum: 97 mmol/L   Carbon Dioxide, Serum: 20 mmol/L   Anion Gap, Serum: 16 mmol/L   Blood Urea Nitrogen, Serum: 8 mg/dL   Creatinine, Serum: 0.6 mg/dL   Glucose, Serum: 276 mg/dL   Calcium, Total Serum: 9.7 mg/dL   Protein Total, Serum: 7.8 g/dL   Albumin, Serum: 4.6 g/dL   Bilirubin Total, Serum: 0.4 mg/dL   Alkaline Phosphatase, Serum: 109 U/L   Aspartate Aminotransferase (AST/SGOT): 53 U/L   Alanine Aminotransferase (ALT/SGPT): 108 U/L     uGlucose Qualitative, Urine: >= 1000 mg/dL   Blood, Urine: Negative   pH Urine: 5.5   Color: Yellow   Urine Appearance: Clear   Bilirubin: Negative   Ketone - Urine: Large   Specific Gravity: 1.037   Protein, Urine: Trace   Urobilinogen: <2 mg/dL   Nitrite: Negative   Leukocyte Esterase Concentration: Negative

## 2021-12-23 NOTE — PATIENT PROFILE ADULT - FALL HARM RISK - HARM RISK INTERVENTIONS

## 2021-12-23 NOTE — ED PROVIDER NOTE - NS ED ROS FT
Constitutional:  (+) weakness No fevers or chills.  Eyes:  No visual changes, eye pain, or discharge.  ENT:  No hearing changes. No sore throat.  Neck:  No neck pain or stiffness.  Cardiac:  No CP or edema.  Resp:  No cough or SOB. No hemoptysis.   GI:  (+) nausea, no vomiting or diarrhea, (+) abdominal pain.  :  No dysuria, frequency, or hematuria.  MSK:  No myalgias or joint pain/swelling.  Neuro:  No headache, dizziness, or weakness.  Skin:  No skin rash.

## 2021-12-23 NOTE — CONSULT NOTE ADULT - ASSESSMENT
IMPRESSION:    DKA  HO IDDM on insulin pump  Colitis  HO Neuropathy      PLAN:    CNS: Avoid CNS depressants    HEENT: Oral care    PULMONARY:  HOB @ 45 degrees    CARDIOVASCULAR: D5LR + K @ 150 cc/hr    GI: GI prophylaxis.  Feeding. Lipase.     RENAL:  Follow up lytes.  Correct as needed.    INFECTIOUS DISEASE: Follow up cultures. UA    HEMATOLOGICAL:  DVT prophylaxis.    ENDOCRINE:  Follow up FS.  Insulin protocol for DKA. Endocrine evaluation.    MUSCULOSKELETAL: OOBTC    MICU

## 2021-12-23 NOTE — ED PROVIDER NOTE - OBJECTIVE STATEMENT
36y female with PMh of DM on insulin pump, HTN, chronic back pain presents with non localized abd pain, nausea and generalized weakness for 3 days.  was seen yesterday for the same and d/c after unremarkable workup.  patient FS today was 277 with worsening abd pain.  Denies HA, dizziness, weakness, fever, cough, CP, SOB, palpitations, Leg swelling, dysuria, hematuria, dark or bloody stool.

## 2021-12-23 NOTE — H&P ADULT - NSHPPHYSICALEXAM_GEN_ALL_CORE
General: NAD   HEENT: Moderately injected conjunctiva, anicteric sclera  Lungs: GBAE, no added sounds   Heart: Tachycardic, normal s1s2, no audible murmurs  Abdomen: +BS, soft, non-distended, nontender. Suprapubic tenderness. No CVA tenderness.   LE: Mild Bilateral LE, pitting, +1.

## 2021-12-23 NOTE — ED ADULT NURSE NOTE - OBJECTIVE STATEMENT
pt presents to the ED with abd x 1 month and mid sternal CP that radiates to L side of back, dizziness.  pt denies fever/chills, n/v/d

## 2021-12-23 NOTE — H&P ADULT - HISTORY OF PRESENT ILLNESS
36-year-old, F, patient, PMHx: Type I DM ( since the age of 16 months) on an insulin pump complicated by Diabetic neuropathy and vulvodynia HTN, chronic back pain, migraine headaches,  presents with lower quadrant abdominal pain, nausea and generalized weakness for 3 days. The patient was seen yesterday for the same compliant and d/c after unremarkable workup from the ED. Today the patient represents to the hospital still complaining of lower quadrant abdominal pain. No Nausea, no vomiting. She denies any diarrhea, but reports that a few weeks ago she was having episodes of bloody diarrhea, and was given a diagnosis of colitis ( no further work up was done). The patient reports urinary frequency. No urgency or dysuria.  36-year-old, F, patient, PMHx: Type I DM ( since the age of 16 months) on an insulin pump complicated by Diabetic neuropathy , HTN, chronic back pain, migraine headaches,  presents with lower quadrant abdominal pain, nausea and generalized weakness for 3 days. The patient was seen yesterday for the same compliant and d/c after unremarkable workup from the ED. Today the patient represents to the hospital still complaining of lower quadrant abdominal pain. No Nausea, no vomiting. She denies any diarrhea, but reports that a few weeks ago she was having episodes of bloody diarrhea, and was given a diagnosis of colitis ( no further work up was done). The patient reports urinary frequency. No urgency or dysuria.

## 2021-12-23 NOTE — H&P ADULT - ASSESSMENT
36-year-old, F, patient, PMHx: Type I DM ( since the age of 16 months) on an insulin pump, complicated by Diabetic neuropathy and vulvodynia HTN, chronic back pain, migraine headaches,  presents with lower quadrant abdominal pain, nausea and generalized weakness for 3 days. The patient was seen yesterday for the same compliant and d/c after unremarkable workup from the ED. Today the patient represents to the hospital still complaining of lower quadrant abdominal pain. No Nausea, no vomiting. She denies any diarrhea, but reports that a few weeks ago she was having episodes of bloody diarrhea, and was given a diagnosis of colitis ( no further work up was done). The patient reports urinary frequency. No urgency or dysuria.     IMPRESSION:  #Mild DKA  #HTN  #Transaminitis  #Liver lesions ( likely fatty infiltration)  #Suspected Inappropriate Sinus Tachycardia      PLAN:    CNS: Avoid Sedatives.     HEENT: Oral care, HOB at 45.     PULMONARY: HOB at 45. Keep Sao2: 92%-96%    CARDIOVASCULAR: 1st Troponin negative, repeat second set. Obtain TTE. EKG. Continue Metoprolol q12hrs.     GI: Feeding ok, no indication for PPIs. Bowel regimen if needed. Transaminitis work up. Right upper quadrant sonogram.     RENAL: I=0, monitor electrolytes and replete as needed.  D5W + LR + 20mEq KCL at 150mL/hour for now.     INFECTIOUS DISEASE: UA is negative, CXR negative, no signs of infection that may have exacerbated or triggered DKA.     HEMATOLOGICAL:  DVT prophylaxis.     ENDOCRINE:  Insulin drip for now. DC patient's PUMP for now. Endocrinology follow up with Dr. Galvez. D5W + LR + 20mEq KCL at 150mL/hour for now.            36-year-old, F, patient, PMHx: Type I DM ( since the age of 16 months) on an insulin pump, complicated by Diabetic neuropathy and vulvodynia HTN, chronic back pain, migraine headaches,  presents with lower quadrant abdominal pain, nausea and generalized weakness for 3 days. The patient was seen yesterday for the same compliant and d/c after unremarkable workup from the ED. Today the patient represents to the hospital still complaining of lower quadrant abdominal pain. No Nausea, no vomiting. She denies any diarrhea, but reports that a few weeks ago she was having episodes of bloody diarrhea, and was given a diagnosis of colitis ( no further work up was done). The patient reports urinary frequency. No urgency or dysuria.     IMPRESSION:  #Mild DKA  #HTN  # abd pain sp CT AP 12/2 result reviewed  #Transaminitis  #Liver lesions ( likely fatty infiltration)  #Suspected Inappropriate Sinus Tachycardia      PLAN:    CNS: Avoid Sedatives.     HEENT: Oral care, HOB at 45.     PULMONARY: HOB at 45. Keep Sao2: 92%-96%    CARDIOVASCULAR: 1st Troponin negative, repeat second set. Obtain TTE. EKG. Continue Metoprolol q12hrs.     GI: Feeding. Bowel regimen if needed. Transaminitis work up. Right upper quadrant sonogram.     RENAL: I=0, monitor electrolytes and replete as needed.  D5W + LR + 20mEq KCL at 150mL/hour for now.     INFECTIOUS DISEASE: UA is negative, CXR negative, no signs of infection that may have exacerbated or triggered DKA.     HEMATOLOGICAL:  DVT prophylaxis.     ENDOCRINE:  Insulin drip for now. DC patient's PUMP for now. Endocrinology follow up with Dr. Galvez. D5W + LR + 20mEq KCL at 150mL/hour for now.

## 2021-12-23 NOTE — ED PROVIDER NOTE - ATTENDING CONTRIBUTION TO CARE
36 F hx of psych illness, gerd, dm s/p pump, now with chronic abd pain and chronic headache, no change in quality. followed by GI already. had MRI and CT scans. recent visit with axr with poss ileus, passing gas. no vomiting.  vss, nontoxic, well appearing, pink conj, anicteric, MMM, neck supple, CTAB, RRR, equal radial pulses bilat, abd soft/nt/nd, no cva tend. no calves tend, no edema, no fnd. no rashes.   a/p: labs, imaging, reassess

## 2021-12-23 NOTE — ED PROVIDER NOTE - CHIEF COMPLAINT
SUBJECTIVE:   Citlali Adrian is a 43 year old female who presents to clinic today for the following health issues:    RESPIRATORY SYMPTOMS      Duration: 1 month    Description  ear pain left and headache    Severity: severe at times    Accompanying signs and symptoms: vision concerns at times    History (predisposing factors):  none    Precipitating or alleviating factors: None    Therapies tried and outcome:  Excedrin    Symptoms consist of  isolated headache to the left side of head and plugged/muffled sensation of left ear. When she goes to unplug ear it sends pain down left side of neck. Headache is lasting 10-30 seconds while ear feels constantly plugged. She also notes occasional eye pain and visual disturbances, as though the upper part of her vision is blocked. Recurrent isolated headaches in past. Denies clenching her teeth, difficulty hearing. Has not tried any OTC medication for treatment. Had a head CT scan for similar symptoms in 2014 with normal results.     Migraine: Most recent migraine occurred over past weekend (03/17). Monthly migraine with period, lasting up to 3 days. Began months ago. Onset of migraine is sometime within the week of period. Resides on left side of head. Will take Excedrin with relief. Ibuprofen didn't help. Denies vision disturbance, light sensitivity, nausea, vomiting or dizziness with migraines.  Is able to go to work and complete normal daily activities when she has a headache.  Family hx of menstrual migraines.     Problem list and histories reviewed & adjusted, as indicated.  Additional history: as documented    Patient Active Problem List   Diagnosis     Other psoriasis     Abdominal pain, right upper quadrant     Performance anxiety     CARDIOVASCULAR SCREENING; LDL GOAL LESS THAN 160     Shoulder impingement syndrome     Fatty liver     Family hx-stroke (mom)     Hydronephrosis, unspecified hydronephrosis type     Past Surgical History:   Procedure Laterality Date      "D & C         Social History   Substance Use Topics     Smoking status: Never Smoker     Smokeless tobacco: Never Used     Alcohol use Yes      Comment: socially     Family History   Problem Relation Age of Onset     Hypertension Mother      Lipids Mother      CEREBROVASCULAR DISEASE Mother      Cancer - colorectal Father      age 78     Breast Cancer Maternal Grandmother      post-menopausal     Hypertension Brother      Lipids Brother      DIABETES Maternal Uncle      Genitourinary Problems Brother      kidney stones - age 50s     Hypertension Sister      50s         Current Outpatient Prescriptions   Medication Sig Dispense Refill     clobetasol propionate 0.05 % LIQD   2     clobetasol (TEMOVATE) 0.05 % ointment Apply sparingly to affected area twice daily as needed.  Do not apply to face. 45 g 1     Allergies   Allergen Reactions     No Known Allergies        Reviewed and updated as needed this visit by clinical staff       Reviewed and updated as needed this visit by Provider         ROS:  HENT: Positive for ear problems   Constitutional, HEENT, cardiovascular, pulmonary, GI,  neuro, and psych systems are negative, except as otherwise noted.    This document serves as a record of the services and decisions personally performed and made by Susan Haase, CNP. It was created on her behalf by Carolyn Dawkins, a trained medical scribe. The creation of this document is based on the provider's statements to the medical scribe.  Carolyn Dawkins 8:51 AM March 21, 2018  OBJECTIVE:   /76 (BP Location: Left arm, Patient Position: Chair, Cuff Size: Adult Regular)  Pulse 70  Temp 98.1  F (36.7  C) (Oral)  Resp 12  Ht 1.626 m (5' 4\")  Wt 69.4 kg (153 lb)  SpO2 100%  BMI 26.26 kg/m2  Body mass index is 26.26 kg/(m^2).  GENERAL: healthy, alert and no distress  HENT: ear canals and TM's normal, nose and mouth without ulcers or lesions  NECK: no adenopathy, no asymmetry, masses, or scars and thyroid normal to " The patient is a 36y Female complaining of multiple medical complaints. palpation  RESP: lungs clear to auscultation - no rales, rhonchi or wheezes  CV: regular rate and rhythm, normal S1 S2, no S3 or S4, no murmur, click or rub, no peripheral edema  NEURO:  Alert, oriented.  PSYCH: mentation appears normal, affect normal/bright    ASSESSMENT/PLAN:   Diagnoses and all orders for this visit:    Dysfunction of left eustachian tube: discussed taking a daily decongestant (sudafed) for the next 2 weeks to see if symptoms resolve. If symptoms continue would recommend referral to ENT    Nonintractable episodic headache, unspecified headache type: headaches are menstrual related, unclear if true menstrual migraines (not the usual symptoms).  Discussed taking Excedrin migraines at onset of headache.  If migraines occur at other times of the month would suggest referral to neurology.       FUTURE APPOINTMENTS:       - Follow-up visit in 2 weeks if symptoms get worse or do not improve     The information in this document, created by the medical scribe for me, accurately reflects the services I personally performed and the decisions made by me. I have reviewed and approved this document for accuracy prior to leaving the patient care area.  March 21, 2018 8:58 AM  Susan Haase, APRN [facility]

## 2021-12-23 NOTE — CONSULT NOTE ADULT - SUBJECTIVE AND OBJECTIVE BOX
Patient is a 36y old  Female who presents with a chief complaint of     HPI:      PAST MEDICAL & SURGICAL HISTORY:  Neuropathy  right lower extremity, vaginal    Type 1 diabetes    Degenerative disc disease, thoracic    Urinary tract infection, recurrent    Gastroesophageal reflux disease, esophagitis presence not specified    Intractable headache, unspecified chronicity pattern, unspecified headache type    Major depressive disorder with single episode, in full remission  previously treated with zyprexa, celexa and lexapro    Tremor of right hand    Tachycardia    Spinal stenosis    No significant past surgical history        Occupational hx:    Social hx:    FAMILY HISTORY:  :  No known cardiovacular family hisotry     ROS:  See HPI     Allergies    amoxicillin (Unknown)  Ceclor (Rash)  ciprofloxacin (Unknown)  clindamycin (Unknown)  flu vaccines (Other)  gabapentin (Unknown)  Influenza Virus Vaccine, H5N1, Inactivated (Unknown)  penicillins (Unknown)    Intolerances          PHYSICAL EXAM    ICU Vital Signs Last 24 Hrs  T(C): 36.8 (23 Dec 2021 08:06), Max: 36.8 (23 Dec 2021 08:06)  T(F): 98.3 (23 Dec 2021 08:06), Max: 98.3 (23 Dec 2021 08:06)  HR: 121 (23 Dec 2021 10:31) (115 - 121)  BP: 146/73 (23 Dec 2021 10:31) (146/73 - 154/98)  BP(mean): --  ABP: --  ABP(mean): --  RR: 26 (23 Dec 2021 10:31) (18 - 26)  SpO2: 100% (23 Dec 2021 10:31) (97% - 100%)      CONSTITUTIONAL:  Well nourished.  NAD    ENT:   Airway patent,   No thrush    EYES:   Clear bilaterally,   pupils equal,   round and reactive to light.    CARDIAC:   Normal rate,   regular rhythm.    no edema      CAROTID:   normal systolic impulse  no bruits    RESPIRATORY:   No wheezing  Normal chest expansion  Not tachypneic,  No use of accessory muscles    GASTROINTESTINAL:  Abdomen soft,   non-tender,   no guarding,   + BS    MUSCULOSKELETAL:   range of motion is not limited,  no clubbing, cyanosis    NEUROLOGICAL:   Alert and oriented   no motor deficits.    SKIN:   Skin normal color for race,   No evidence of rash.    PSYCHIATRIC:   normal mood and affect.   no apparent risk to self or others.        LABS:                          11.4   12.26 )-----------( 451      ( 23 Dec 2021 09:12 )             35.5                                               12    133<L>  |  97<L>  |  8<L>  ----------------------------<  276<H>  4.2   |  20  |  0.6<L>    Ca    9.7      23 Dec 2021 09:12  Phos  2.9       Mg     1.8         TPro  7.8  /  Alb  4.6  /  TBili  0.4  /  DBili  x   /  AST  53<H>  /  ALT  108<H>  /  AlkPhos  109                                               Urinalysis Basic - ( 23 Dec 2021 09:44 )    Color: Yellow / Appearance: Clear / S.037 / pH: x  Gluc: x / Ketone: Large  / Bili: Negative / Urobili: <2 mg/dL   Blood: x / Protein: Trace / Nitrite: Negative   Leuk Esterase: Negative / RBC: x / WBC x   Sq Epi: x / Non Sq Epi: x / Bacteria: x        CARDIAC MARKERS ( 23 Dec 2021 09:12 )  x     / <0.01 ng/mL / x     / x     / x                                                LIVER FUNCTIONS - ( 23 Dec 2021 09:12 )  Alb: 4.6 g/dL / Pro: 7.8 g/dL / ALK PHOS: 109 U/L / ALT: 108 U/L / AST: 53 U/L / GGT: x                                                                                                                                       X-Rays                                                                                     ECHO    MEDICATIONS  (STANDING):  chlorhexidine 4% Liquid 1 Application(s) Topical daily  insulin regular Infusion 6 Unit(s)/Hr (6 mL/Hr) IV Continuous <Continuous>    MEDICATIONS  (PRN):

## 2021-12-23 NOTE — ED ADULT NURSE NOTE - CHIEF COMPLAINT QUOTE
Patient from [facility] , recently seen for abdominal pain in ED. Presents today c/o worsening abdominal pain Nausea and midsternal CP that is not reproducible or radiating

## 2021-12-23 NOTE — ED ADULT NURSE NOTE - ED STAT RN HANDOFF DETAILS
pt endorsed to KALLIE Freed in critical care. pt on cardiac monitor, bed alarm in place. insulin at 7.8 initiated. safety measures in place

## 2021-12-24 LAB
ANION GAP SERPL CALC-SCNC: 12 MMOL/L — SIGNIFICANT CHANGE UP (ref 7–14)
ANION GAP SERPL CALC-SCNC: 15 MMOL/L — HIGH (ref 7–14)
ANION GAP SERPL CALC-SCNC: 15 MMOL/L — HIGH (ref 7–14)
ANION GAP SERPL CALC-SCNC: 19 MMOL/L — HIGH (ref 7–14)
BUN SERPL-MCNC: 6 MG/DL — LOW (ref 10–20)
BUN SERPL-MCNC: 7 MG/DL — LOW (ref 10–20)
BUN SERPL-MCNC: 8 MG/DL — LOW (ref 10–20)
BUN SERPL-MCNC: 9 MG/DL — LOW (ref 10–20)
CALCIUM SERPL-MCNC: 8.6 MG/DL — SIGNIFICANT CHANGE UP (ref 8.5–10.1)
CALCIUM SERPL-MCNC: 9.1 MG/DL — SIGNIFICANT CHANGE UP (ref 8.5–10.1)
CALCIUM SERPL-MCNC: 9.1 MG/DL — SIGNIFICANT CHANGE UP (ref 8.5–10.1)
CALCIUM SERPL-MCNC: 9.5 MG/DL — SIGNIFICANT CHANGE UP (ref 8.5–10.1)
CERULOPLASMIN SERPL-MCNC: 28 MG/DL — SIGNIFICANT CHANGE UP (ref 16–45)
CHLORIDE SERPL-SCNC: 102 MMOL/L — SIGNIFICANT CHANGE UP (ref 98–110)
CHLORIDE SERPL-SCNC: 106 MMOL/L — SIGNIFICANT CHANGE UP (ref 98–110)
CHLORIDE SERPL-SCNC: 107 MMOL/L — SIGNIFICANT CHANGE UP (ref 98–110)
CHLORIDE SERPL-SCNC: 95 MMOL/L — LOW (ref 98–110)
CO2 SERPL-SCNC: 17 MMOL/L — SIGNIFICANT CHANGE UP (ref 17–32)
CO2 SERPL-SCNC: 19 MMOL/L — SIGNIFICANT CHANGE UP (ref 17–32)
CO2 SERPL-SCNC: 19 MMOL/L — SIGNIFICANT CHANGE UP (ref 17–32)
CO2 SERPL-SCNC: 20 MMOL/L — SIGNIFICANT CHANGE UP (ref 17–32)
CREAT SERPL-MCNC: 0.5 MG/DL — LOW (ref 0.7–1.5)
CREAT SERPL-MCNC: 0.5 MG/DL — LOW (ref 0.7–1.5)
CREAT SERPL-MCNC: 0.6 MG/DL — LOW (ref 0.7–1.5)
CREAT SERPL-MCNC: 0.7 MG/DL — SIGNIFICANT CHANGE UP (ref 0.7–1.5)
D DIMER BLD IA.RAPID-MCNC: 13 NG/ML DDU — SIGNIFICANT CHANGE UP (ref 0–230)
FERRITIN SERPL-MCNC: 9 NG/ML — LOW (ref 15–150)
GLUCOSE BLDC GLUCOMTR-MCNC: 159 MG/DL — HIGH (ref 70–99)
GLUCOSE BLDC GLUCOMTR-MCNC: 164 MG/DL — HIGH (ref 70–99)
GLUCOSE BLDC GLUCOMTR-MCNC: 174 MG/DL — HIGH (ref 70–99)
GLUCOSE BLDC GLUCOMTR-MCNC: 217 MG/DL — HIGH (ref 70–99)
GLUCOSE BLDC GLUCOMTR-MCNC: 239 MG/DL — HIGH (ref 70–99)
GLUCOSE BLDC GLUCOMTR-MCNC: 249 MG/DL — HIGH (ref 70–99)
GLUCOSE BLDC GLUCOMTR-MCNC: 252 MG/DL — HIGH (ref 70–99)
GLUCOSE BLDC GLUCOMTR-MCNC: 258 MG/DL — HIGH (ref 70–99)
GLUCOSE BLDC GLUCOMTR-MCNC: 268 MG/DL — HIGH (ref 70–99)
GLUCOSE BLDC GLUCOMTR-MCNC: 278 MG/DL — HIGH (ref 70–99)
GLUCOSE BLDC GLUCOMTR-MCNC: 287 MG/DL — HIGH (ref 70–99)
GLUCOSE BLDC GLUCOMTR-MCNC: 300 MG/DL — HIGH (ref 70–99)
GLUCOSE BLDC GLUCOMTR-MCNC: 325 MG/DL — HIGH (ref 70–99)
GLUCOSE BLDC GLUCOMTR-MCNC: 84 MG/DL — SIGNIFICANT CHANGE UP (ref 70–99)
GLUCOSE BLDC GLUCOMTR-MCNC: 95 MG/DL — SIGNIFICANT CHANGE UP (ref 70–99)
GLUCOSE BLDC GLUCOMTR-MCNC: 99 MG/DL — SIGNIFICANT CHANGE UP (ref 70–99)
GLUCOSE SERPL-MCNC: 159 MG/DL — HIGH (ref 70–99)
GLUCOSE SERPL-MCNC: 279 MG/DL — HIGH (ref 70–99)
GLUCOSE SERPL-MCNC: 280 MG/DL — HIGH (ref 70–99)
GLUCOSE SERPL-MCNC: 92 MG/DL — SIGNIFICANT CHANGE UP (ref 70–99)
HAV IGM SER-ACNC: SIGNIFICANT CHANGE UP
HBV CORE IGM SER-ACNC: SIGNIFICANT CHANGE UP
HBV SURFACE AG SER-ACNC: SIGNIFICANT CHANGE UP
HCG SERPL QL: NEGATIVE — SIGNIFICANT CHANGE UP
HCG UR QL: NEGATIVE — SIGNIFICANT CHANGE UP
HCT VFR BLD CALC: 30.2 % — LOW (ref 37–47)
HCV AB S/CO SERPL IA: 0.1 S/CO — SIGNIFICANT CHANGE UP (ref 0–0.99)
HCV AB SERPL-IMP: SIGNIFICANT CHANGE UP
HGB BLD-MCNC: 9.5 G/DL — LOW (ref 12–16)
MAGNESIUM SERPL-MCNC: 1.7 MG/DL — LOW (ref 1.8–2.4)
MCHC RBC-ENTMCNC: 26.8 PG — LOW (ref 27–31)
MCHC RBC-ENTMCNC: 31.5 G/DL — LOW (ref 32–37)
MCV RBC AUTO: 85.3 FL — SIGNIFICANT CHANGE UP (ref 81–99)
NRBC # BLD: 0 /100 WBCS — SIGNIFICANT CHANGE UP (ref 0–0)
PLATELET # BLD AUTO: 386 K/UL — SIGNIFICANT CHANGE UP (ref 130–400)
POTASSIUM SERPL-MCNC: 3.8 MMOL/L — SIGNIFICANT CHANGE UP (ref 3.5–5)
POTASSIUM SERPL-MCNC: 4 MMOL/L — SIGNIFICANT CHANGE UP (ref 3.5–5)
POTASSIUM SERPL-MCNC: 4.2 MMOL/L — SIGNIFICANT CHANGE UP (ref 3.5–5)
POTASSIUM SERPL-MCNC: 4.9 MMOL/L — SIGNIFICANT CHANGE UP (ref 3.5–5)
POTASSIUM SERPL-SCNC: 3.8 MMOL/L — SIGNIFICANT CHANGE UP (ref 3.5–5)
POTASSIUM SERPL-SCNC: 4 MMOL/L — SIGNIFICANT CHANGE UP (ref 3.5–5)
POTASSIUM SERPL-SCNC: 4.2 MMOL/L — SIGNIFICANT CHANGE UP (ref 3.5–5)
POTASSIUM SERPL-SCNC: 4.9 MMOL/L — SIGNIFICANT CHANGE UP (ref 3.5–5)
RBC # BLD: 3.54 M/UL — LOW (ref 4.2–5.4)
RBC # FLD: 14.6 % — HIGH (ref 11.5–14.5)
SODIUM SERPL-SCNC: 131 MMOL/L — LOW (ref 135–146)
SODIUM SERPL-SCNC: 136 MMOL/L — SIGNIFICANT CHANGE UP (ref 135–146)
SODIUM SERPL-SCNC: 138 MMOL/L — SIGNIFICANT CHANGE UP (ref 135–146)
SODIUM SERPL-SCNC: 141 MMOL/L — SIGNIFICANT CHANGE UP (ref 135–146)
WBC # BLD: 9.22 K/UL — SIGNIFICANT CHANGE UP (ref 4.8–10.8)
WBC # FLD AUTO: 9.22 K/UL — SIGNIFICANT CHANGE UP (ref 4.8–10.8)

## 2021-12-24 PROCEDURE — 99233 SBSQ HOSP IP/OBS HIGH 50: CPT

## 2021-12-24 PROCEDURE — 99222 1ST HOSP IP/OBS MODERATE 55: CPT

## 2021-12-24 PROCEDURE — 76705 ECHO EXAM OF ABDOMEN: CPT | Mod: 26

## 2021-12-24 PROCEDURE — 93306 TTE W/DOPPLER COMPLETE: CPT | Mod: 26

## 2021-12-24 RX ORDER — ENOXAPARIN SODIUM 100 MG/ML
40 INJECTION SUBCUTANEOUS DAILY
Refills: 0 | Status: DISCONTINUED | OUTPATIENT
Start: 2021-12-24 | End: 2022-01-12

## 2021-12-24 RX ORDER — INSULIN GLARGINE 100 [IU]/ML
24 INJECTION, SOLUTION SUBCUTANEOUS AT BEDTIME
Refills: 0 | Status: DISCONTINUED | OUTPATIENT
Start: 2021-12-24 | End: 2021-12-24

## 2021-12-24 RX ORDER — POLYETHYLENE GLYCOL 3350 17 G/17G
17 POWDER, FOR SOLUTION ORAL ONCE
Refills: 0 | Status: DISCONTINUED | OUTPATIENT
Start: 2021-12-24 | End: 2021-12-24

## 2021-12-24 RX ORDER — INSULIN LISPRO 100/ML
VIAL (ML) SUBCUTANEOUS
Refills: 0 | Status: DISCONTINUED | OUTPATIENT
Start: 2021-12-24 | End: 2021-12-24

## 2021-12-24 RX ORDER — DEXTROSE MONOHYDRATE, SODIUM CHLORIDE, AND POTASSIUM CHLORIDE 50; .745; 4.5 G/1000ML; G/1000ML; G/1000ML
1000 INJECTION, SOLUTION INTRAVENOUS
Refills: 0 | Status: DISCONTINUED | OUTPATIENT
Start: 2021-12-24 | End: 2021-12-24

## 2021-12-24 RX ORDER — INSULIN LISPRO 100/ML
5 VIAL (ML) SUBCUTANEOUS
Refills: 0 | Status: DISCONTINUED | OUTPATIENT
Start: 2021-12-24 | End: 2021-12-24

## 2021-12-24 RX ORDER — DEXTROSE 50 % IN WATER 50 %
25 SYRINGE (ML) INTRAVENOUS ONCE
Refills: 0 | Status: DISCONTINUED | OUTPATIENT
Start: 2021-12-24 | End: 2022-01-03

## 2021-12-24 RX ORDER — CYCLOBENZAPRINE HYDROCHLORIDE 10 MG/1
10 TABLET, FILM COATED ORAL THREE TIMES A DAY
Refills: 0 | Status: DISCONTINUED | OUTPATIENT
Start: 2021-12-24 | End: 2022-01-12

## 2021-12-24 RX ORDER — ACETAMINOPHEN 500 MG
650 TABLET ORAL EVERY 6 HOURS
Refills: 0 | Status: DISCONTINUED | OUTPATIENT
Start: 2021-12-24 | End: 2022-01-12

## 2021-12-24 RX ORDER — SODIUM CHLORIDE 9 MG/ML
1000 INJECTION, SOLUTION INTRAVENOUS
Refills: 0 | Status: DISCONTINUED | OUTPATIENT
Start: 2021-12-24 | End: 2022-01-03

## 2021-12-24 RX ORDER — INSULIN HUMAN 100 [IU]/ML
2 INJECTION, SOLUTION SUBCUTANEOUS
Qty: 100 | Refills: 0 | Status: DISCONTINUED | OUTPATIENT
Start: 2021-12-24 | End: 2021-12-25

## 2021-12-24 RX ORDER — POTASSIUM CHLORIDE 20 MEQ
20 PACKET (EA) ORAL ONCE
Refills: 0 | Status: COMPLETED | OUTPATIENT
Start: 2021-12-24 | End: 2021-12-24

## 2021-12-24 RX ORDER — INSULIN LISPRO 100/ML
8 VIAL (ML) SUBCUTANEOUS
Refills: 0 | Status: DISCONTINUED | OUTPATIENT
Start: 2021-12-24 | End: 2021-12-24

## 2021-12-24 RX ORDER — GLUCAGON INJECTION, SOLUTION 0.5 MG/.1ML
1 INJECTION, SOLUTION SUBCUTANEOUS ONCE
Refills: 0 | Status: DISCONTINUED | OUTPATIENT
Start: 2021-12-24 | End: 2022-01-03

## 2021-12-24 RX ORDER — SENNA PLUS 8.6 MG/1
1 TABLET ORAL ONCE
Refills: 0 | Status: DISCONTINUED | OUTPATIENT
Start: 2021-12-24 | End: 2021-12-24

## 2021-12-24 RX ORDER — DEXTROSE 50 % IN WATER 50 %
50 SYRINGE (ML) INTRAVENOUS ONCE
Refills: 0 | Status: COMPLETED | OUTPATIENT
Start: 2021-12-24 | End: 2021-12-24

## 2021-12-24 RX ORDER — DEXTROSE 50 % IN WATER 50 %
15 SYRINGE (ML) INTRAVENOUS ONCE
Refills: 0 | Status: DISCONTINUED | OUTPATIENT
Start: 2021-12-24 | End: 2022-01-03

## 2021-12-24 RX ORDER — POLYETHYLENE GLYCOL 3350 17 G/17G
17 POWDER, FOR SOLUTION ORAL DAILY
Refills: 0 | Status: DISCONTINUED | OUTPATIENT
Start: 2021-12-24 | End: 2022-01-06

## 2021-12-24 RX ORDER — ACETAMINOPHEN 500 MG
650 TABLET ORAL ONCE
Refills: 0 | Status: COMPLETED | OUTPATIENT
Start: 2021-12-24 | End: 2021-12-24

## 2021-12-24 RX ORDER — DEXTROSE MONOHYDRATE, SODIUM CHLORIDE, AND POTASSIUM CHLORIDE 50; .745; 4.5 G/1000ML; G/1000ML; G/1000ML
1000 INJECTION, SOLUTION INTRAVENOUS
Refills: 0 | Status: DISCONTINUED | OUTPATIENT
Start: 2021-12-24 | End: 2021-12-25

## 2021-12-24 RX ORDER — ONDANSETRON 8 MG/1
4 TABLET, FILM COATED ORAL ONCE
Refills: 0 | Status: COMPLETED | OUTPATIENT
Start: 2021-12-24 | End: 2021-12-24

## 2021-12-24 RX ORDER — DEXTROSE 50 % IN WATER 50 %
12.5 SYRINGE (ML) INTRAVENOUS ONCE
Refills: 0 | Status: DISCONTINUED | OUTPATIENT
Start: 2021-12-24 | End: 2022-01-03

## 2021-12-24 RX ORDER — INSULIN GLARGINE 100 [IU]/ML
18 INJECTION, SOLUTION SUBCUTANEOUS EVERY MORNING
Refills: 0 | Status: DISCONTINUED | OUTPATIENT
Start: 2021-12-24 | End: 2021-12-24

## 2021-12-24 RX ORDER — ONDANSETRON 8 MG/1
4 TABLET, FILM COATED ORAL ONCE
Refills: 0 | Status: DISCONTINUED | OUTPATIENT
Start: 2021-12-24 | End: 2021-12-24

## 2021-12-24 RX ORDER — SENNA PLUS 8.6 MG/1
1 TABLET ORAL DAILY
Refills: 0 | Status: DISCONTINUED | OUTPATIENT
Start: 2021-12-24 | End: 2021-12-27

## 2021-12-24 RX ORDER — MAGNESIUM SULFATE 500 MG/ML
2 VIAL (ML) INJECTION ONCE
Refills: 0 | Status: COMPLETED | OUTPATIENT
Start: 2021-12-24 | End: 2021-12-24

## 2021-12-24 RX ADMIN — Medication 25 MILLIGRAM(S): at 16:53

## 2021-12-24 RX ADMIN — Medication 650 MILLIGRAM(S): at 01:06

## 2021-12-24 RX ADMIN — ONDANSETRON 4 MILLIGRAM(S): 8 TABLET, FILM COATED ORAL at 09:53

## 2021-12-24 RX ADMIN — Medication 1 APPLICATION(S): at 17:01

## 2021-12-24 RX ADMIN — FAMOTIDINE 40 MILLIGRAM(S): 10 INJECTION INTRAVENOUS at 06:00

## 2021-12-24 RX ADMIN — Medication 650 MILLIGRAM(S): at 18:34

## 2021-12-24 RX ADMIN — Medication 650 MILLIGRAM(S): at 12:59

## 2021-12-24 RX ADMIN — Medication 3: at 16:54

## 2021-12-24 RX ADMIN — DEXTROSE MONOHYDRATE, SODIUM CHLORIDE, AND POTASSIUM CHLORIDE 200 MILLILITER(S): 50; .745; 4.5 INJECTION, SOLUTION INTRAVENOUS at 06:37

## 2021-12-24 RX ADMIN — Medication 25 GRAM(S): at 06:50

## 2021-12-24 RX ADMIN — Medication 1 APPLICATION(S): at 05:01

## 2021-12-24 RX ADMIN — FAMOTIDINE 40 MILLIGRAM(S): 10 INJECTION INTRAVENOUS at 16:53

## 2021-12-24 RX ADMIN — SENNA PLUS 1 TABLET(S): 8.6 TABLET ORAL at 15:16

## 2021-12-24 RX ADMIN — DEXTROSE MONOHYDRATE, SODIUM CHLORIDE, AND POTASSIUM CHLORIDE 200 MILLILITER(S): 50; .745; 4.5 INJECTION, SOLUTION INTRAVENOUS at 01:31

## 2021-12-24 RX ADMIN — CYCLOBENZAPRINE HYDROCHLORIDE 10 MILLIGRAM(S): 10 TABLET, FILM COATED ORAL at 22:38

## 2021-12-24 RX ADMIN — CYCLOBENZAPRINE HYDROCHLORIDE 10 MILLIGRAM(S): 10 TABLET, FILM COATED ORAL at 15:16

## 2021-12-24 RX ADMIN — Medication 650 MILLIGRAM(S): at 18:55

## 2021-12-24 RX ADMIN — Medication 20 MILLIEQUIVALENT(S): at 05:00

## 2021-12-24 RX ADMIN — Medication 81 MILLIGRAM(S): at 12:13

## 2021-12-24 RX ADMIN — ENOXAPARIN SODIUM 40 MILLIGRAM(S): 100 INJECTION SUBCUTANEOUS at 12:12

## 2021-12-24 RX ADMIN — Medication 8 UNIT(S): at 16:54

## 2021-12-24 RX ADMIN — Medication 25 MILLIGRAM(S): at 06:01

## 2021-12-24 RX ADMIN — CHLORHEXIDINE GLUCONATE 1 APPLICATION(S): 213 SOLUTION TOPICAL at 12:58

## 2021-12-24 RX ADMIN — Medication 75 MILLIGRAM(S): at 21:07

## 2021-12-24 RX ADMIN — POLYETHYLENE GLYCOL 3350 17 GRAM(S): 17 POWDER, FOR SOLUTION ORAL at 15:16

## 2021-12-24 RX ADMIN — Medication 650 MILLIGRAM(S): at 00:19

## 2021-12-24 RX ADMIN — INSULIN GLARGINE 18 UNIT(S): 100 INJECTION, SOLUTION SUBCUTANEOUS at 10:07

## 2021-12-24 RX ADMIN — Medication 650 MILLIGRAM(S): at 11:25

## 2021-12-24 NOTE — CONSULT NOTE ADULT - SUBJECTIVE AND OBJECTIVE BOX
CRISTI MONTGOMERY 36y Female  MRN#: [id number]   Hospital Day: 1d    HPI:  36-year-old, F, patient, PMHx: Type I DM ( since the age of 16 months) on an insulin pump complicated by Diabetic neuropathy , HTN, chronic back pain, migraine headaches,  presents with lower quadrant abdominal pain, nausea and generalized weakness for 3 days. The patient was seen yesterday for the same compliant and d/c after unremarkable workup from the ED. Today the patient represents to the hospital still complaining of lower quadrant abdominal pain. No Nausea, no vomiting. She denies any diarrhea, but reports that a few weeks ago she was having episodes of bloody diarrhea, and was given a diagnosis of colitis ( no further work up was done). The patient reports urinary frequency. No urgency or dysuria.  (23 Dec 2021 13:20)      OBJECTIVE  PAST MEDICAL & SURGICAL HISTORY  Neuropathy  right lower extremity, vaginal    Type 1 diabetes    Degenerative disc disease, thoracic    Urinary tract infection, recurrent    Gastroesophageal reflux disease, esophagitis presence not specified    Intractable headache, unspecified chronicity pattern, unspecified headache type    Major depressive disorder with single episode, in full remission  previously treated with zyprexa, celexa and lexapro    Tremor of right hand    Tachycardia    Spinal stenosis    No significant past surgical history      ALLERGIES:  amoxicillin (Unknown)  Ceclor (Rash)  ciprofloxacin (Unknown)  clindamycin (Unknown)  flu vaccines (Other)  gabapentin (Unknown)  Influenza Virus Vaccine, H5N1, Inactivated (Unknown)  penicillins (Unknown)    MEDICATIONS:  STANDING MEDICATIONS  amitriptyline Oral Tab/Cap - Peds 75 milliGRAM(s) Oral at bedtime  ammonium lactate 12% Lotion 1 Application(s) Topical every 12 hours  aspirin  chewable 81 milliGRAM(s) Oral daily  chlorhexidine 4% Liquid 1 Application(s) Topical daily  dextrose 40% Gel 15 Gram(s) Oral once  dextrose 5% + lactated ringers with potassium chloride 20 mEq/L 1000 milliLiter(s) IV Continuous <Continuous>  dextrose 5%. 1000 milliLiter(s) IV Continuous <Continuous>  dextrose 5%. 1000 milliLiter(s) IV Continuous <Continuous>  dextrose 50% Injectable 25 Gram(s) IV Push once  dextrose 50% Injectable 12.5 Gram(s) IV Push once  dextrose 50% Injectable 25 Gram(s) IV Push once  famotidine    Tablet 40 milliGRAM(s) Oral two times a day  glucagon  Injectable 1 milliGRAM(s) IntraMuscular once  insulin glargine Injectable (LANTUS) 18 Unit(s) SubCutaneous every morning  insulin lispro (ADMELOG) corrective regimen sliding scale   SubCutaneous three times a day before meals  insulin lispro Injectable (ADMELOG) 5 Unit(s) SubCutaneous before breakfast  insulin lispro Injectable (ADMELOG) 5 Unit(s) SubCutaneous before lunch  insulin lispro Injectable (ADMELOG) 5 Unit(s) SubCutaneous before dinner  insulin regular Infusion 6 Unit(s)/Hr IV Continuous <Continuous>  metoprolol tartrate 25 milliGRAM(s) Oral two times a day    PRN MEDICATIONS      VITAL SIGNS: Last 24 Hours  T(C): 36.4 (24 Dec 2021 08:00), Max: 37.6 (23 Dec 2021 22:10)  T(F): 97.5 (24 Dec 2021 08:00), Max: 99.7 (23 Dec 2021 22:10)  HR: 100 (24 Dec 2021 09:00) (94 - 126)  BP: 98/46 (24 Dec 2021 09:00) (98/46 - 146/73)  BP(mean): 66 (24 Dec 2021 09:00) (65 - 96)  RR: 28 (24 Dec 2021 09:00) (16 - 35)  SpO2: 100% (24 Dec 2021 09:00) (97% - 100%)    LABS:                        9.5    9.22  )-----------( 386      ( 24 Dec 2021 04:50 )             30.2     12-    138  |  106  |  6<L>  ----------------------------<  159<H>  4.0   |  20  |  0.6<L>    Ca    8.6      24 Dec 2021 04:50  Phos  2.9     12-  Mg     1.7     12-    TPro  7.8  /  Alb  4.6  /  TBili  0.4  /  DBili  x   /  AST  53<H>  /  ALT  108<H>  /  AlkPhos  109  12      Urinalysis Basic - ( 23 Dec 2021 09:44 )    Color: Yellow / Appearance: Clear / S.037 / pH: x  Gluc: x / Ketone: Large  / Bili: Negative / Urobili: <2 mg/dL   Blood: x / Protein: Trace / Nitrite: Negative   Leuk Esterase: Negative / RBC: x / WBC x   Sq Epi: x / Non Sq Epi: x / Bacteria: x        Troponin T, Serum: <0.01 ng/mL (21 @ 16:52)  Sedimentation Rate, Erythrocyte: 10 mm/Hr (21 @ 16:52)      CARDIAC MARKERS ( 23 Dec 2021 16:52 )  x     / <0.01 ng/mL / x     / x     / x      CARDIAC MARKERS ( 23 Dec 2021 09:12 )  x     / <0.01 ng/mL / x     / x     / x          RADIOLOGY:  < from: US Abdomen Upper Quadrant Right (21 @ 06:31) >  IMPRESSION:    Unremarkable right upper quadrant ultrasound.    < end of copied text >      PHYSICAL EXAM:  CONSTITUTIONAL: No acute distress, well-developed, well-groomed, AAOx3  ENT: moist mucous membranes  PULMONARY: Dec BS  CARDIOVASCULAR: Tachy   GASTROINTESTINAL: tenderness +ve  MUSCULOSKELETAL: 1+ LE edema   NEUROLOGY: Moves all extremities

## 2021-12-24 NOTE — CHART NOTE - NSCHARTNOTEFT_GEN_A_CORE
36-year-old, F, patient, PMHx: Type I DM, on an insulin pump, complicated by Diabetic neuropathy and vulvodynia, HTN, chronic back pain, migraine headaches,  presents with lower quadrant abdominal pain, nausea and generalized weakness for 3 days, admitted for management of DKA.    Patient was put on IV fluids D5+LR, and insulin drip started.  Potassium was repleted and it is running in the IV fluids.  CT abdomen shows aspect of colitis, no need for antibiotics right now, might have decompensated her state  Patient is now tolerating feeds, will be switched to lantus and subcutaneous lispro now that the anion gap has closed.    Lantus order as per endocrine is 24 units, and pre-meal lispro as 8 units each      To continue lopressor for her inappropriate sinus tachycardia 36-year-old, F, patient, PMHx: Type I DM, on an insulin pump, complicated by Diabetic neuropathy and vulvodynia, HTN, chronic back pain, migraine headaches,  presents with lower quadrant abdominal pain, nausea and generalized weakness for 3 days, admitted for management of DKA.    Patient was put on IV fluids D5+LR, and insulin drip started.  Potassium was repleted and it is running in the IV fluids.  CT abdomen shows aspect of colitis, no need for antibiotics right now, might have decompensated her state  Patient is now tolerating feeds, will be switched to lantus and subcutaneous lispro now that the anion gap has closed.    Lantus order as per endocrine is 35 units, and pre-meal lispro as 15 units each  Also, patient had an episode of abnormal movements of both lower and upper extremities, that might be secondary to her polyneuropathy.  Neuro were consulted, recommended vEEG, and a psych consult; consult is in    To continue lopressor for her inappropriate sinus tachycardia

## 2021-12-24 NOTE — PROGRESS NOTE ADULT - SUBJECTIVE AND OBJECTIVE BOX
Patient is a 36y old  Female who presents with a chief complaint of Mild DKA (23 Dec 2021 13:20)        Over Night Events:  remains on IV insulin.  Off pressors.  Feels Nauseous         ROS:     All ROS are negative except HPI         PHYSICAL EXAM    ICU Vital Signs Last 24 Hrs  T(C): 37.4 (24 Dec 2021 04:00), Max: 37.6 (23 Dec 2021 22:10)  T(F): 99.4 (24 Dec 2021 04:00), Max: 99.7 (23 Dec 2021 22:10)  HR: 106 (24 Dec 2021 06:00) (96 - 126)  BP: 113/69 (24 Dec 2021 06:00) (100/48 - 146/73)  BP(mean): 88 (24 Dec 2021 06:00) (65 - 96)  ABP: --  ABP(mean): --  RR: 24 (24 Dec 2021 06:00) (16 - 35)  SpO2: 100% (24 Dec 2021 06:00) (97% - 100%)      CONSTITUTIONAL:  Well nourished.  NAD    ENT:   Airway patent,   Mouth with normal mucosa.   No thrush    EYES:   Pupils equal,   Round and reactive to light.    CARDIAC:   Normal rate,   Regular rhythm.    No edema      Vascular:  Normal systolic impulse  No Carotid bruits    RESPIRATORY:   No wheezing  Bilateral BS  Normal chest expansion  Not tachypneic,  No use of accessory muscles    GASTROINTESTINAL:  Abdomen soft,   Non-tender,   No guarding,   + BS    MUSCULOSKELETAL:   Range of motion is not limited,  No clubbing, cyanosis    NEUROLOGICAL:   Alert and oriented   No motor  deficits.    SKIN:   Skin normal color for race,   Warm and dry and intact.   No evidence of rash.    PSYCHIATRIC:   Normal mood and affect.   No apparent risk to self or others.    HEMATOLOGICAL:  No cervical  lymphadenopathy.  no inguinal lymphadenopathy      21 @ 07:01  -  21 @ 07:00  --------------------------------------------------------  IN:    dextrose 5% + lactated ringers w/ Additives: 1400 mL    Insulin: 26 mL    IV PiggyBack: 50 mL    sodium chloride 0.9%: 600 mL  Total IN: 2076 mL    OUT:    Voided (mL): 1600 mL  Total OUT: 1600 mL    Total NET: 476 mL          LABS:                            9.5    9.22  )-----------( 386      ( 24 Dec 2021 04:50 )             30.2                                                   138  |  106  |  6<L>  ----------------------------<  159<H>  4.0   |  20  |  0.6<L>    Ca    8.6      24 Dec 2021 04:50  Phos  2.9       Mg     1.7         TPro  7.8  /  Alb  4.6  /  TBili  0.4  /  DBili  x   /  AST  53<H>  /  ALT  108<H>  /  AlkPhos  109                                               Urinalysis Basic - ( 23 Dec 2021 09:44 )    Color: Yellow / Appearance: Clear / S.037 / pH: x  Gluc: x / Ketone: Large  / Bili: Negative / Urobili: <2 mg/dL   Blood: x / Protein: Trace / Nitrite: Negative   Leuk Esterase: Negative / RBC: x / WBC x   Sq Epi: x / Non Sq Epi: x / Bacteria: x        CARDIAC MARKERS ( 23 Dec 2021 16:52 )  x     / <0.01 ng/mL / x     / x     / x      CARDIAC MARKERS ( 23 Dec 2021 09:12 )  x     / <0.01 ng/mL / x     / x     / x                                                LIVER FUNCTIONS - ( 23 Dec 2021 16:52 )  Alb: x     / Pro: x     / ALK PHOS: x     / ALT: x     / AST: x     / GGT: 81 U/L                                                                                                                                   MEDICATIONS  (STANDING):  amitriptyline Oral Tab/Cap - Peds 75 milliGRAM(s) Oral at bedtime  ammonium lactate 12% Lotion 1 Application(s) Topical every 12 hours  aspirin  chewable 81 milliGRAM(s) Oral daily  chlorhexidine 4% Liquid 1 Application(s) Topical daily  dextrose 40% Gel 15 Gram(s) Oral once  dextrose 5% + lactated ringers with potassium chloride 20 mEq/L 1000 milliLiter(s) (200 mL/Hr) IV Continuous <Continuous>  dextrose 5%. 1000 milliLiter(s) (50 mL/Hr) IV Continuous <Continuous>  dextrose 5%. 1000 milliLiter(s) (100 mL/Hr) IV Continuous <Continuous>  dextrose 50% Injectable 25 Gram(s) IV Push once  dextrose 50% Injectable 12.5 Gram(s) IV Push once  dextrose 50% Injectable 25 Gram(s) IV Push once  famotidine    Tablet 40 milliGRAM(s) Oral two times a day  glucagon  Injectable 1 milliGRAM(s) IntraMuscular once  insulin glargine Injectable (LANTUS) 18 Unit(s) SubCutaneous every morning  insulin lispro (ADMELOG) corrective regimen sliding scale   SubCutaneous three times a day before meals  insulin lispro Injectable (ADMELOG) 5 Unit(s) SubCutaneous before breakfast  insulin lispro Injectable (ADMELOG) 5 Unit(s) SubCutaneous before lunch  insulin lispro Injectable (ADMELOG) 5 Unit(s) SubCutaneous before dinner  insulin regular Infusion 6 Unit(s)/Hr (6 mL/Hr) IV Continuous <Continuous>  metoprolol tartrate 25 milliGRAM(s) Oral two times a day    MEDICATIONS  (PRN):      New X-rays reviewed:                                                                                  ECHO    CXR interpreted by me:  NO infiltrates

## 2021-12-24 NOTE — CONSULT NOTE ADULT - ASSESSMENT
#)Lower quadrant abdominal pain/alternate constipation/diarrhea  -Last BM 4 days ago passing gas  -reported 4-5 loose stools 2 times/week rest of the days 1-2 BM non bloody   -h/o bloody stools and continuous diarrhea one month ago resolved with flagyl  -ESR 10    Recs:   continue with regular diet  Check CRP, fecal calprotectin   if pain is improving tolerating diet can follow up as an OP for colonoscopy and EGD      #)Transaminitis hepatocellular pattern likely NAFLD vs r/o other CLD  -Two liver lesions in CT scan likely fatty deposition   -Normal liver enzymes in 10/2021  -US hepatic steatosis, CBD 3 mm  -perform a CLD work up which includes HAV IgM/IgG, HBsAg, HBsAb, HBcAb IgM/IgG, HCV Ab, Anti HEV, Serum Ferritin, Transferrin Saturation, Ceruloplasmin level, SETH, SMA, immunoglobulins panel, AMA, Anti LK microsomal Ab, anti-soluble liver Ag, EBV, HSV, CMV, Tissue transglutaminase IgA, Tital IgA, Deaminated gliadin peptide IgG, Tissue transglutaminase IgG    will discuss with attending  36-year-old, F, patient, PMHx: Type I DM ( since the age of 16 months) on an insulin pump complicated by Diabetic neuropathy , HTN, chronic back pain, migraine headaches,  presents with lower quadrant abdominal pain, nausea and generalized weakness for 3 days admitted for DKA. She has lower abdominal pain which started last month crampy 7-8/10 associated with 10-15 loose stools with 2 episodes of blood mixed with stool came to D CT showed colitis was given flagyl resolved in a week with Abx. Again same pain started on 12/2 with no diarrhea came to ED CT scan showed no colitis was discharged to f/u as an OP. She saw Dr. Mejia after that as an OP.   Currently c/o lower quadrant pain 4-5/1o in intensity, constant pain, not associated with food or bowel movement. Denies any nausea, vomiting, melena, hematochezia, weight loss, loss of appetite, family h/o GI cancers.     #)Chronic Lower quadrant abdominal pain/alternate constipation/diarrhea  -Last BM 4 days ago passing gas, able to tolerate diet  -reported 4-5 loose stools 2 times/week rest of the days 1-2 BM non bloody   -h/o bloody stools and continuous diarrhea one month ago resolved with flagyl  -ESR 10  -CT scan 11/29 showed colitis  -CT scan 12/2-No colitis  -MRI 12/26 limited due to motion     Recs:   continue with regular diet  Check CRP, fecal calprotectin even though low suspicion for IBD  tolerating diet can follow up as an OP for colonoscopy and EGD    #)Transaminitis hepatocellular pattern likely NAFLD vs r/o other CLD  -Two liver lesions in CT scan likely fatty deposition   -Normal liver enzymes in 10/2021  -US hepatic steatosis, CBD 3 mm  -perform a CLD work up which includes HAV IgM/IgG, HBsAg, HBsAb, HBcAb IgM/IgG, HCV Ab, Anti HEV, Serum Ferritin, Transferrin Saturation, Ceruloplasmin level, SETH, SMA, immunoglobulins panel, AMA, Anti LK microsomal Ab, anti-soluble liver Ag, EBV, HSV, CMV, Tissue transglutaminase IgA, Tital IgA, Deaminated gliadin peptide IgG, Tissue transglutaminase IgG  -OP MRI for further evaluation of liver lesions    recall as needed

## 2021-12-24 NOTE — PROGRESS NOTE ADULT - ASSESSMENT
36-year-old, F, patient, PMHx: Type I DM, on an insulin pump, complicated by Diabetic neuropathy and vulvodynia, HTN, chronic back pain, migraine headaches,  presents with lower quadrant abdominal pain, nausea and generalized weakness for 3 days, admitted for management of DKA.  IMPRESSION:  #Mild DKA  # abd pain sp CT abdomen/ pelvis showing colitis  #Transaminitis  #Liver lesions ( likely fatty infiltration)  #Suspected Inappropriate Sinus Tachycardia      PLAN:    CNS: Avoid Sedatives.     HEENT: Oral care, HOB at 45.     PULMONARY: HOB at 45. Keep Sao2: 92%-96%    CARDIOVASCULAR: Troponins are negative, on Lopressor for inappropriate sinus tachycardia    GI: Feeding: Feeds right now, will see if patient tolerates    RENAL: I=0, monitor electrolytes and replete as needed.  D5W + LR + 20mEq KCL at 200 mL/hour for now.     INFECTIOUS DISEASE: UA is negative, CXR negative, no signs of infection that may have exacerbated or triggered DKA.     HEMATOLOGICAL:  DVT prophylaxis.     ENDOCRINE:  Insulin drip for now running according to patient's hourly finger stick. Pump d/c'ed. Endocrinology consult pending. D5W + LR + 20mEq KCL at 200 mL/hour for now.   Now that anion gap has closed, will be monitoring patient's finger sticks and if she is tolerating feeds, switch her to Lantus + lispro

## 2021-12-24 NOTE — PROGRESS NOTE ADULT - SUBJECTIVE AND OBJECTIVE BOX
SUBJECTIVE:    Patient is a 36y old Female who presents with a chief complaint of Mild DKA (24 Dec 2021 08:42)    Overnight Events: No overnight events. Patient is less symptomatic, with less nausea, no vomiting, less abdominal pain, no diarrhea overnight.  Patient is still receiving the insulin drip, and considering transition to subcutaneous insulin once finger stick is less than 200, patient tolerating feeds.  Anion gap has already closed.    PAST MEDICAL & SURGICAL HISTORY  Neuropathy  right lower extremity, vaginal    Type 1 diabetes    Degenerative disc disease, thoracic    Urinary tract infection, recurrent    Gastroesophageal reflux disease, esophagitis presence not specified    Intractable headache, unspecified chronicity pattern, unspecified headache type    Major depressive disorder with single episode, in full remission  previously treated with zyprexa, celexa and lexapro    Tremor of right hand    Tachycardia    Spinal stenosis    No significant past surgical history      SOCIAL HISTORY:  Negative for smoking/alcohol/drug use.     ALLERGIES:  amoxicillin (Unknown)  Ceclor (Rash)  ciprofloxacin (Unknown)  clindamycin (Unknown)  flu vaccines (Other)  gabapentin (Unknown)  Influenza Virus Vaccine, H5N1, Inactivated (Unknown)  penicillins (Unknown)    MEDICATIONS:  STANDING MEDICATIONS  amitriptyline Oral Tab/Cap - Peds 75 milliGRAM(s) Oral at bedtime  ammonium lactate 12% Lotion 1 Application(s) Topical every 12 hours  aspirin  chewable 81 milliGRAM(s) Oral daily  chlorhexidine 4% Liquid 1 Application(s) Topical daily  dextrose 40% Gel 15 Gram(s) Oral once  dextrose 5% + lactated ringers with potassium chloride 20 mEq/L 1000 milliLiter(s) IV Continuous <Continuous>  dextrose 5%. 1000 milliLiter(s) IV Continuous <Continuous>  dextrose 5%. 1000 milliLiter(s) IV Continuous <Continuous>  dextrose 50% Injectable 25 Gram(s) IV Push once  dextrose 50% Injectable 12.5 Gram(s) IV Push once  dextrose 50% Injectable 25 Gram(s) IV Push once  famotidine    Tablet 40 milliGRAM(s) Oral two times a day  glucagon  Injectable 1 milliGRAM(s) IntraMuscular once  insulin glargine Injectable (LANTUS) 18 Unit(s) SubCutaneous every morning  insulin lispro (ADMELOG) corrective regimen sliding scale   SubCutaneous three times a day before meals  insulin lispro Injectable (ADMELOG) 5 Unit(s) SubCutaneous before breakfast  insulin lispro Injectable (ADMELOG) 5 Unit(s) SubCutaneous before lunch  insulin lispro Injectable (ADMELOG) 5 Unit(s) SubCutaneous before dinner  insulin regular Infusion 6 Unit(s)/Hr IV Continuous <Continuous>  metoprolol tartrate 25 milliGRAM(s) Oral two times a day    PRN MEDICATIONS    VITALS:   T(F): 97.5, Max: 99.7 (21 @ 22:10)  HR: 100 (94 - 126)  BP: 98/46 (98/46 - 146/73)  RR: 28 (16 - 35)  SpO2: 100% (97% - 100%)    LABS:                        9.5    9.22  )-----------( 386      ( 24 Dec 2021 04:50 )             30.2         138  |  106  |  6<L>  ----------------------------<  159<H>  4.0   |  20  |  0.6<L>    Ca    8.6      24 Dec 2021 04:50  Phos  2.9       Mg     1.7         TPro  7.8  /  Alb  4.6  /  TBili  0.4  /  DBili  x   /  AST  53<H>  /  ALT  108<H>  /  AlkPhos  109        Urinalysis Basic - ( 23 Dec 2021 09:44 )    Color: Yellow / Appearance: Clear / S.037 / pH: x  Gluc: x / Ketone: Large  / Bili: Negative / Urobili: <2 mg/dL   Blood: x / Protein: Trace / Nitrite: Negative   Leuk Esterase: Negative / RBC: x / WBC x   Sq Epi: x / Non Sq Epi: x / Bacteria: x        Troponin T, Serum: <0.01 ng/mL (21 @ 16:52)  Sedimentation Rate, Erythrocyte: 10 mm/Hr (21 @ 16:52)      CARDIAC MARKERS ( 23 Dec 2021 16:52 )  x     / <0.01 ng/mL / x     / x     / x      CARDIAC MARKERS ( 23 Dec 2021 09:12 )  x     / <0.01 ng/mL / x     / x     / x              21 @ 07:01  -  21 @ 07:00  --------------------------------------------------------  IN: 2076 mL / OUT: 1600 mL / NET: 476 mL    21 @ 07:01  -  21 @ 10:05  --------------------------------------------------------  IN: 603 mL / OUT: 0 mL / NET: 603 mL          IMAGING/EKG: No overnight imaging    PHYSICAL EXAM:  GEN: NAD, comfortable  LUNGS: CTAB, no w/r/r  HEART: RRR, s1 and s2 appreciated, no m/r/g  ABD: Diffuse abdominal tenderness, no guarding, no rebound tenderness  EXT: no edema, PP b/l  NEURO: AAOX3

## 2021-12-24 NOTE — CONSULT NOTE ADULT - ASSESSMENT
36-year-old, F, patient, PMHx: Type I DM ( since the age of 16 months) on an insulin pump complicated by Diabetic neuropathy , HTN, chronic back pain, migraine headaches, presents with lower quadrant abdominal pain, nausea and generalized weakness for 3 days.     IMPRESSION:  #DM type I; on Insulin pump. Last seen by Dr. Galvez on 10/12/2021; rate adjusted to 1.4 unit/hr from 12am to 6am, 1 unit/hr from 6AM to 12PM, 1.6 unit/hr from 12PM to 12AM  #DKA resolved    PLAN: 36-year-old, F, patient, PMHx: Type I DM ( since the age of 16 months) on an insulin pump complicated by Diabetic neuropathy , HTN, chronic back pain, migraine headaches, presents with lower quadrant abdominal pain, nausea and generalized weakness for 3 days.     IMPRESSION:  #DM type I; on Insulin pump. Last seen by Dr. Galvez on 10/12/2021; rate adjusted to 1.4 unit/hr from 12am to 6am, 1 unit/hr from 6AM to 12PM, 1.6 unit/hr from 12PM to 12AM  #DKA resolved    PLAN:  increase glargine to 24 units, and lispro to 8 units before meals. discuss with family whether to resume pump, or stay on injections until has had further diabetes education.

## 2021-12-24 NOTE — CONSULT NOTE ADULT - SUBJECTIVE AND OBJECTIVE BOX
Gastroenterology Consultation:    Patient is a 36y old  Female who presents with a chief complaint of Mild DKA (24 Dec 2021 08:10)      Admitted on: 12-23-21  HPI:  36-year-old, F, patient, PMHx: Type I DM ( since the age of 16 months) on an insulin pump complicated by Diabetic neuropathy , HTN, chronic back pain, migraine headaches,  presents with lower quadrant abdominal pain, nausea and generalized weakness for 3 days. The patient was seen yesterday for the same compliant and d/c after unremarkable workup from the ED. Today the patient represents to the hospital still complaining of lower quadrant abdominal pain. No Nausea, no vomiting. She denies any diarrhea, but reports that a few weeks ago she was having episodes of bloody diarrhea, and was given a diagnosis of colitis ( no further work up was done). The patient reports urinary frequency. No urgency or dysuria.  (23 Dec 2021 13:20)      Prior EGD:  Prior Colonoscopy:      PAST MEDICAL & SURGICAL HISTORY:  Neuropathy  right lower extremity, vaginal    Type 1 diabetes    Degenerative disc disease, thoracic    Urinary tract infection, recurrent    Gastroesophageal reflux disease, esophagitis presence not specified    Intractable headache, unspecified chronicity pattern, unspecified headache type    Major depressive disorder with single episode, in full remission  previously treated with zyprexa, celexa and lexapro    Tremor of right hand    Tachycardia    Spinal stenosis    No significant past surgical history        FAMILY HISTORY:      Social History:  Tobacco:  Alcohol:  Drugs:    Home Medications:  amitriptyline 75 mg oral tablet: 1 tab(s) orally once a day (at bedtime) (04 Nov 2021 15:06)  ammonium lactate 12% topical cream: Apply topically to affected area 2 times a day (23 Dec 2021 13:53)  cyclobenzaprine 10 mg oral tablet:  (04 Nov 2021 15:06)  famotidine 40 mg oral tablet: 1 tab(s) orally 2 times a day (23 Dec 2021 13:53)  metoprolol tartrate 25 mg oral tablet: 1 tab(s) orally 2 times a day (04 Nov 2021 15:06)  multivitamin: 1 tab(s) orally once a day (04 Nov 2021 15:06)  naproxen 500 mg oral delayed release tablet: 1 tab(s) orally 2 times a day (04 Nov 2021 15:06)    MEDICATIONS  (STANDING):  amitriptyline Oral Tab/Cap - Peds 75 milliGRAM(s) Oral at bedtime  ammonium lactate 12% Lotion 1 Application(s) Topical every 12 hours  aspirin  chewable 81 milliGRAM(s) Oral daily  chlorhexidine 4% Liquid 1 Application(s) Topical daily  dextrose 40% Gel 15 Gram(s) Oral once  dextrose 5% + lactated ringers with potassium chloride 20 mEq/L 1000 milliLiter(s) (200 mL/Hr) IV Continuous <Continuous>  dextrose 5%. 1000 milliLiter(s) (50 mL/Hr) IV Continuous <Continuous>  dextrose 5%. 1000 milliLiter(s) (100 mL/Hr) IV Continuous <Continuous>  dextrose 50% Injectable 25 Gram(s) IV Push once  dextrose 50% Injectable 12.5 Gram(s) IV Push once  dextrose 50% Injectable 25 Gram(s) IV Push once  famotidine    Tablet 40 milliGRAM(s) Oral two times a day  glucagon  Injectable 1 milliGRAM(s) IntraMuscular once  insulin glargine Injectable (LANTUS) 18 Unit(s) SubCutaneous every morning  insulin lispro (ADMELOG) corrective regimen sliding scale   SubCutaneous three times a day before meals  insulin lispro Injectable (ADMELOG) 5 Unit(s) SubCutaneous before breakfast  insulin lispro Injectable (ADMELOG) 5 Unit(s) SubCutaneous before lunch  insulin lispro Injectable (ADMELOG) 5 Unit(s) SubCutaneous before dinner  insulin regular Infusion 6 Unit(s)/Hr (6 mL/Hr) IV Continuous <Continuous>  metoprolol tartrate 25 milliGRAM(s) Oral two times a day    MEDICATIONS  (PRN):      Allergies  amoxicillin (Unknown)  Ceclor (Rash)  ciprofloxacin (Unknown)  clindamycin (Unknown)  flu vaccines (Other)  gabapentin (Unknown)  Influenza Virus Vaccine, H5N1, Inactivated (Unknown)  penicillins (Unknown)      Review of Systems:   Constitutional:  No Fever, No Chills  ENT/Mouth:  No Hearing Changes,  No Difficulty Swallowing  Eyes:  No Eye Pain, No Vision Changes  Cardiovascular:  No Chest Pain, No Palpitations  Respiratory:  No Cough, No Dyspnea  Gastrointestinal:  As described in HPI  Musculoskeletal:  No Joint Swelling, No Back Pain  Skin:  No Skin Lesions, No Jaundice  Neuro:  No Syncope, No Dizziness  Heme/Lymph:  No Bruising, No Bleeding.          Physical Examination:  T(C): 37.4 (12-24-21 @ 04:00), Max: 37.6 (12-23-21 @ 22:10)  HR: 106 (12-24-21 @ 06:00) (96 - 126)  BP: 113/69 (12-24-21 @ 06:00) (100/48 - 146/73)  RR: 24 (12-24-21 @ 06:00) (16 - 35)  SpO2: 100% (12-24-21 @ 08:21) (97% - 100%)      12-23-21 @ 07:01  -  12-24-21 @ 07:00  --------------------------------------------------------  IN: 2076 mL / OUT: 1600 mL / NET: 476 mL        Constitutional: No acute distress.  Eyes:. Conjunctivae are clear, Sclera is non-icteric.  Ears Nose and Throat: The external ears are normal appearing,  Oral mucosa is pink and moist.  Respiratory:  No signs of respiratory distress. Lung sounds are clear bilaterally.  Cardiovascular:  S1 S2, Regular rate and rhythm.  GI: Abdomen is soft, symmetric, and non-tender without distention. There are no visible lesions or scars. Bowel sounds are present and normoactive in all four quadrants. No masses, hepatomegaly, or splenomegaly are noted.   Neuro: No Tremor, No involuntary movements  Skin: No rashes, No Jaundice.          Data:                        9.5    9.22  )-----------( 386      ( 24 Dec 2021 04:50 )             30.2     Hgb Trend:  9.5  12-24-21 @ 04:50  11.4  12-23-21 @ 09:12        12-24    138  |  106  |  6<L>  ----------------------------<  159<H>  4.0   |  20  |  0.6<L>    Ca    8.6      24 Dec 2021 04:50  Phos  2.9     12-23  Mg     1.7     12-24    TPro  7.8  /  Alb  4.6  /  TBili  0.4  /  DBili  x   /  AST  53<H>  /  ALT  108<H>  /  AlkPhos  109  12-23    Liver panel trend:  TBili 0.4   /   AST 53   /      /   AlkP 109   /   Tptn 7.8   /   Alb 4.6    /   DBili --      12-23              Radiology:    US Abdomen Upper Quadrant Right:   ACC: [id number] EXAM:  US ABDOMEN RT UPR QUADRANT                          PROCEDURE DATE:  12/24/2021          INTERPRETATION:  CLINICAL HISTORY / REASON FOR EXAM: Transaminitis.    CORRELATION: CT abdomen and pelvis from December 2, 2021    PROCEDURE: Ultrasound of the right upper quadrant was performed.    FINDINGS:    LIVER: Measures 18.1 cm. Within normal limits.  BILE DUCTS: Normal caliber. Common bile duct measures 5 mm.  GALLBLADDER: Within normal limits.  PANCREAS: Poorly visualized.  RIGHT KIDNEY: 10.3 cm. No hydronephrosis.  ASCITES: None.  IVC: Visualized portions are within normal limits.      IMPRESSION:    Unremarkable right upper quadrant ultrasound.    --- End of Report ---            PATRICIA ROCHE MD; Attending Radiologist  This document has been electronically signed. Dec 24 2021  8:38AM (12-24-21 @ 06:31)     Gastroenterology Consultation:    Patient is a 36y old  Female who presents with a chief complaint of Mild DKA (24 Dec 2021 08:10)      Admitted on: 12-23-21  HPI:  36-year-old, F, patient, PMHx: Type I DM ( since the age of 16 months) on an insulin pump complicated by Diabetic neuropathy , HTN, chronic back pain, migraine headaches,  presents with lower quadrant abdominal pain, nausea and generalized weakness for 3 days admitted for DKA. She has lower abdominal pain which started last month crampy 7-8/10 associated with 10-15 loose stools with 2 episodes of blood mixed with stool came to D CT showed colitis was given flagyl resolved in a week with Abx. Again same pain started on 12/2 with no diarrhea came to ED CT scan showed no colitis was discharged to f/u as an OP. She saw Dr. Mejia after that as an OP.   Thiagoenдмитрий c/o lower quadrant pain 4-5/1o in intensity, constant pain, not associated with food or bowel movement. Denies any nausea, vomiting, melena, hematochezia, weight loss, loss of appetite, family h/o GI cancers.       Prior EGD: never had  Prior Colonoscopy: never had      PAST MEDICAL & SURGICAL HISTORY:  Neuropathy  right lower extremity, vaginal    Type 1 diabetes    Degenerative disc disease, thoracic    Urinary tract infection, recurrent    Gastroesophageal reflux disease, esophagitis presence not specified    Intractable headache, unspecified chronicity pattern, unspecified headache type    Major depressive disorder with single episode, in full remission  previously treated with zyprexa, celexa and lexapro    Tremor of right hand    Tachycardia    Spinal stenosis    No significant past surgical history        FAMILY HISTORY:  no family h/o GI cnacers    Social History:  Tobacco: N  Alcohol: N  Drugs: N    Home Medications:  amitriptyline 75 mg oral tablet: 1 tab(s) orally once a day (at bedtime) (04 Nov 2021 15:06)  ammonium lactate 12% topical cream: Apply topically to affected area 2 times a day (23 Dec 2021 13:53)  cyclobenzaprine 10 mg oral tablet:  (04 Nov 2021 15:06)  famotidine 40 mg oral tablet: 1 tab(s) orally 2 times a day (23 Dec 2021 13:53)  metoprolol tartrate 25 mg oral tablet: 1 tab(s) orally 2 times a day (04 Nov 2021 15:06)  multivitamin: 1 tab(s) orally once a day (04 Nov 2021 15:06)  naproxen 500 mg oral delayed release tablet: 1 tab(s) orally 2 times a day (04 Nov 2021 15:06)    MEDICATIONS  (STANDING):  amitriptyline Oral Tab/Cap - Peds 75 milliGRAM(s) Oral at bedtime  ammonium lactate 12% Lotion 1 Application(s) Topical every 12 hours  aspirin  chewable 81 milliGRAM(s) Oral daily  chlorhexidine 4% Liquid 1 Application(s) Topical daily  dextrose 40% Gel 15 Gram(s) Oral once  dextrose 5% + lactated ringers with potassium chloride 20 mEq/L 1000 milliLiter(s) (200 mL/Hr) IV Continuous <Continuous>  dextrose 5%. 1000 milliLiter(s) (50 mL/Hr) IV Continuous <Continuous>  dextrose 5%. 1000 milliLiter(s) (100 mL/Hr) IV Continuous <Continuous>  dextrose 50% Injectable 25 Gram(s) IV Push once  dextrose 50% Injectable 12.5 Gram(s) IV Push once  dextrose 50% Injectable 25 Gram(s) IV Push once  famotidine    Tablet 40 milliGRAM(s) Oral two times a day  glucagon  Injectable 1 milliGRAM(s) IntraMuscular once  insulin glargine Injectable (LANTUS) 18 Unit(s) SubCutaneous every morning  insulin lispro (ADMELOG) corrective regimen sliding scale   SubCutaneous three times a day before meals  insulin lispro Injectable (ADMELOG) 5 Unit(s) SubCutaneous before breakfast  insulin lispro Injectable (ADMELOG) 5 Unit(s) SubCutaneous before lunch  insulin lispro Injectable (ADMELOG) 5 Unit(s) SubCutaneous before dinner  insulin regular Infusion 6 Unit(s)/Hr (6 mL/Hr) IV Continuous <Continuous>  metoprolol tartrate 25 milliGRAM(s) Oral two times a day    MEDICATIONS  (PRN):      Allergies  amoxicillin (Unknown)  Ceclor (Rash)  ciprofloxacin (Unknown)  clindamycin (Unknown)  flu vaccines (Other)  gabapentin (Unknown)  Influenza Virus Vaccine, H5N1, Inactivated (Unknown)  penicillins (Unknown)      Review of Systems:   Constitutional:  No Fever, No Chills  ENT/Mouth:  No Hearing Changes,  No Difficulty Swallowing  Eyes:  No Eye Pain, No Vision Changes  Cardiovascular:  No Chest Pain, No Palpitations  Respiratory:  No Cough, No Dyspnea  Gastrointestinal:  As described in HPI  Musculoskeletal:  No Joint Swelling, No Back Pain  Skin:  No Skin Lesions, No Jaundice  Neuro:  No Syncope, No Dizziness  Heme/Lymph:  No Bruising, No Bleeding.          Physical Examination:  T(C): 37.4 (12-24-21 @ 04:00), Max: 37.6 (12-23-21 @ 22:10)  HR: 106 (12-24-21 @ 06:00) (96 - 126)  BP: 113/69 (12-24-21 @ 06:00) (100/48 - 146/73)  RR: 24 (12-24-21 @ 06:00) (16 - 35)  SpO2: 100% (12-24-21 @ 08:21) (97% - 100%)      12-23-21 @ 07:01  -  12-24-21 @ 07:00  --------------------------------------------------------  IN: 2076 mL / OUT: 1600 mL / NET: 476 mL        Constitutional: No acute distress.  Eyes:. Conjunctivae are clear, Sclera is non-icteric.  Ears Nose and Throat: The external ears are normal appearing,  Oral mucosa is pink and moist.  Respiratory:  No signs of respiratory distress. Lung sounds are clear bilaterally.  Cardiovascular:  S1 S2, Regular rate and rhythm.  GI: Abdomen is soft, symmetric, and non-tender without distention. There are no visible lesions or scars. Bowel sounds are present and normoactive in all four quadrants. No masses, hepatomegaly, or splenomegaly are noted.   Neuro: No Tremor, No involuntary movements  Skin: No rashes, No Jaundice.          Data:                        9.5    9.22  )-----------( 386      ( 24 Dec 2021 04:50 )             30.2     Hgb Trend:  9.5  12-24-21 @ 04:50  11.4  12-23-21 @ 09:12        12-24    138  |  106  |  6<L>  ----------------------------<  159<H>  4.0   |  20  |  0.6<L>    Ca    8.6      24 Dec 2021 04:50  Phos  2.9     12-23  Mg     1.7     12-24    TPro  7.8  /  Alb  4.6  /  TBili  0.4  /  DBili  x   /  AST  53<H>  /  ALT  108<H>  /  AlkPhos  109  12-23    Liver panel trend:  TBili 0.4   /   AST 53   /      /   AlkP 109   /   Tptn 7.8   /   Alb 4.6    /   DBili --      12-23              Radiology:    US Abdomen Upper Quadrant Right:   ACC: [id number] EXAM:  US ABDOMEN RT UPR QUADRANT                          PROCEDURE DATE:  12/24/2021          INTERPRETATION:  CLINICAL HISTORY / REASON FOR EXAM: Transaminitis.    CORRELATION: CT abdomen and pelvis from December 2, 2021    PROCEDURE: Ultrasound of the right upper quadrant was performed.    FINDINGS:    LIVER: Measures 18.1 cm. Within normal limits.  BILE DUCTS: Normal caliber. Common bile duct measures 5 mm.  GALLBLADDER: Within normal limits.  PANCREAS: Poorly visualized.  RIGHT KIDNEY: 10.3 cm. No hydronephrosis.  ASCITES: None.  IVC: Visualized portions are within normal limits.      IMPRESSION:    Unremarkable right upper quadrant ultrasound.    --- End of Report ---            PATRICIA ROCHE MD; Attending Radiologist  This document has been electronically signed. Dec 24 2021  8:38AM (12-24-21 @ 06:31)

## 2021-12-25 LAB
A1C WITH ESTIMATED AVERAGE GLUCOSE RESULT: 8.6 % — HIGH (ref 4–5.6)
ALBUMIN SERPL ELPH-MCNC: 3.8 G/DL — SIGNIFICANT CHANGE UP (ref 3.5–5.2)
ALBUMIN SERPL ELPH-MCNC: 4.1 G/DL — SIGNIFICANT CHANGE UP (ref 3.5–5.2)
ALP SERPL-CCNC: 88 U/L — SIGNIFICANT CHANGE UP (ref 30–115)
ALP SERPL-CCNC: 98 U/L — SIGNIFICANT CHANGE UP (ref 30–115)
ALT FLD-CCNC: 73 U/L — HIGH (ref 0–41)
ALT FLD-CCNC: 79 U/L — HIGH (ref 0–41)
ANION GAP SERPL CALC-SCNC: 12 MMOL/L — SIGNIFICANT CHANGE UP (ref 7–14)
ANION GAP SERPL CALC-SCNC: 13 MMOL/L — SIGNIFICANT CHANGE UP (ref 7–14)
ANION GAP SERPL CALC-SCNC: 15 MMOL/L — HIGH (ref 7–14)
ANION GAP SERPL CALC-SCNC: 17 MMOL/L — HIGH (ref 7–14)
AST SERPL-CCNC: 30 U/L — SIGNIFICANT CHANGE UP (ref 0–41)
AST SERPL-CCNC: 32 U/L — SIGNIFICANT CHANGE UP (ref 0–41)
BILIRUB SERPL-MCNC: 0.2 MG/DL — SIGNIFICANT CHANGE UP (ref 0.2–1.2)
BILIRUB SERPL-MCNC: 0.3 MG/DL — SIGNIFICANT CHANGE UP (ref 0.2–1.2)
BUN SERPL-MCNC: 5 MG/DL — LOW (ref 10–20)
BUN SERPL-MCNC: 5 MG/DL — LOW (ref 10–20)
BUN SERPL-MCNC: 7 MG/DL — LOW (ref 10–20)
BUN SERPL-MCNC: 8 MG/DL — LOW (ref 10–20)
CALCIUM SERPL-MCNC: 8.4 MG/DL — LOW (ref 8.5–10.1)
CALCIUM SERPL-MCNC: 8.8 MG/DL — SIGNIFICANT CHANGE UP (ref 8.5–10.1)
CALCIUM SERPL-MCNC: 9.3 MG/DL — SIGNIFICANT CHANGE UP (ref 8.5–10.1)
CALCIUM SERPL-MCNC: 9.4 MG/DL — SIGNIFICANT CHANGE UP (ref 8.5–10.1)
CHLORIDE SERPL-SCNC: 102 MMOL/L — SIGNIFICANT CHANGE UP (ref 98–110)
CHLORIDE SERPL-SCNC: 103 MMOL/L — SIGNIFICANT CHANGE UP (ref 98–110)
CHLORIDE SERPL-SCNC: 104 MMOL/L — SIGNIFICANT CHANGE UP (ref 98–110)
CHLORIDE SERPL-SCNC: 99 MMOL/L — SIGNIFICANT CHANGE UP (ref 98–110)
CO2 SERPL-SCNC: 17 MMOL/L — SIGNIFICANT CHANGE UP (ref 17–32)
CO2 SERPL-SCNC: 21 MMOL/L — SIGNIFICANT CHANGE UP (ref 17–32)
CO2 SERPL-SCNC: 21 MMOL/L — SIGNIFICANT CHANGE UP (ref 17–32)
CO2 SERPL-SCNC: 22 MMOL/L — SIGNIFICANT CHANGE UP (ref 17–32)
CREAT SERPL-MCNC: 0.5 MG/DL — LOW (ref 0.7–1.5)
CREAT SERPL-MCNC: 0.6 MG/DL — LOW (ref 0.7–1.5)
ESTIMATED AVERAGE GLUCOSE: 200 MG/DL — HIGH (ref 68–114)
GLUCOSE BLDC GLUCOMTR-MCNC: 114 MG/DL — HIGH (ref 70–99)
GLUCOSE BLDC GLUCOMTR-MCNC: 134 MG/DL — HIGH (ref 70–99)
GLUCOSE BLDC GLUCOMTR-MCNC: 141 MG/DL — HIGH (ref 70–99)
GLUCOSE BLDC GLUCOMTR-MCNC: 152 MG/DL — HIGH (ref 70–99)
GLUCOSE BLDC GLUCOMTR-MCNC: 185 MG/DL — HIGH (ref 70–99)
GLUCOSE BLDC GLUCOMTR-MCNC: 189 MG/DL — HIGH (ref 70–99)
GLUCOSE BLDC GLUCOMTR-MCNC: 195 MG/DL — HIGH (ref 70–99)
GLUCOSE BLDC GLUCOMTR-MCNC: 196 MG/DL — HIGH (ref 70–99)
GLUCOSE BLDC GLUCOMTR-MCNC: 215 MG/DL — HIGH (ref 70–99)
GLUCOSE BLDC GLUCOMTR-MCNC: 216 MG/DL — HIGH (ref 70–99)
GLUCOSE BLDC GLUCOMTR-MCNC: 223 MG/DL — HIGH (ref 70–99)
GLUCOSE BLDC GLUCOMTR-MCNC: 224 MG/DL — HIGH (ref 70–99)
GLUCOSE BLDC GLUCOMTR-MCNC: 224 MG/DL — HIGH (ref 70–99)
GLUCOSE BLDC GLUCOMTR-MCNC: 229 MG/DL — HIGH (ref 70–99)
GLUCOSE BLDC GLUCOMTR-MCNC: 229 MG/DL — HIGH (ref 70–99)
GLUCOSE BLDC GLUCOMTR-MCNC: 245 MG/DL — HIGH (ref 70–99)
GLUCOSE BLDC GLUCOMTR-MCNC: 258 MG/DL — HIGH (ref 70–99)
GLUCOSE BLDC GLUCOMTR-MCNC: 274 MG/DL — HIGH (ref 70–99)
GLUCOSE BLDC GLUCOMTR-MCNC: 279 MG/DL — HIGH (ref 70–99)
GLUCOSE BLDC GLUCOMTR-MCNC: 285 MG/DL — HIGH (ref 70–99)
GLUCOSE BLDC GLUCOMTR-MCNC: 300 MG/DL — HIGH (ref 70–99)
GLUCOSE SERPL-MCNC: 129 MG/DL — HIGH (ref 70–99)
GLUCOSE SERPL-MCNC: 229 MG/DL — HIGH (ref 70–99)
GLUCOSE SERPL-MCNC: 235 MG/DL — HIGH (ref 70–99)
GLUCOSE SERPL-MCNC: 240 MG/DL — HIGH (ref 70–99)
HCT VFR BLD CALC: 32.1 % — LOW (ref 37–47)
HGB BLD-MCNC: 10.1 G/DL — LOW (ref 12–16)
MAGNESIUM SERPL-MCNC: 1.7 MG/DL — LOW (ref 1.8–2.4)
MAGNESIUM SERPL-MCNC: 1.7 MG/DL — LOW (ref 1.8–2.4)
MCHC RBC-ENTMCNC: 26.9 PG — LOW (ref 27–31)
MCHC RBC-ENTMCNC: 31.5 G/DL — LOW (ref 32–37)
MCV RBC AUTO: 85.6 FL — SIGNIFICANT CHANGE UP (ref 81–99)
MITOCHONDRIA AB SER-ACNC: SIGNIFICANT CHANGE UP
NRBC # BLD: 0 /100 WBCS — SIGNIFICANT CHANGE UP (ref 0–0)
PHOSPHATE SERPL-MCNC: 3.3 MG/DL — SIGNIFICANT CHANGE UP (ref 2.1–4.9)
PHOSPHATE SERPL-MCNC: 3.3 MG/DL — SIGNIFICANT CHANGE UP (ref 2.1–4.9)
PLATELET # BLD AUTO: 373 K/UL — SIGNIFICANT CHANGE UP (ref 130–400)
POTASSIUM SERPL-MCNC: 3.8 MMOL/L — SIGNIFICANT CHANGE UP (ref 3.5–5)
POTASSIUM SERPL-MCNC: 4.2 MMOL/L — SIGNIFICANT CHANGE UP (ref 3.5–5)
POTASSIUM SERPL-MCNC: 4.4 MMOL/L — SIGNIFICANT CHANGE UP (ref 3.5–5)
POTASSIUM SERPL-MCNC: 4.5 MMOL/L — SIGNIFICANT CHANGE UP (ref 3.5–5)
POTASSIUM SERPL-SCNC: 3.8 MMOL/L — SIGNIFICANT CHANGE UP (ref 3.5–5)
POTASSIUM SERPL-SCNC: 4.2 MMOL/L — SIGNIFICANT CHANGE UP (ref 3.5–5)
POTASSIUM SERPL-SCNC: 4.4 MMOL/L — SIGNIFICANT CHANGE UP (ref 3.5–5)
POTASSIUM SERPL-SCNC: 4.5 MMOL/L — SIGNIFICANT CHANGE UP (ref 3.5–5)
PROCALCITONIN SERPL-MCNC: 0.49 NG/ML — HIGH (ref 0.02–0.1)
PROT SERPL-MCNC: 6.4 G/DL — SIGNIFICANT CHANGE UP (ref 6–8)
PROT SERPL-MCNC: 6.7 G/DL — SIGNIFICANT CHANGE UP (ref 6–8)
RBC # BLD: 3.75 M/UL — LOW (ref 4.2–5.4)
RBC # FLD: 14.6 % — HIGH (ref 11.5–14.5)
SODIUM SERPL-SCNC: 133 MMOL/L — LOW (ref 135–146)
SODIUM SERPL-SCNC: 136 MMOL/L — SIGNIFICANT CHANGE UP (ref 135–146)
SODIUM SERPL-SCNC: 138 MMOL/L — SIGNIFICANT CHANGE UP (ref 135–146)
SODIUM SERPL-SCNC: 139 MMOL/L — SIGNIFICANT CHANGE UP (ref 135–146)
WBC # BLD: 5.28 K/UL — SIGNIFICANT CHANGE UP (ref 4.8–10.8)
WBC # FLD AUTO: 5.28 K/UL — SIGNIFICANT CHANGE UP (ref 4.8–10.8)

## 2021-12-25 PROCEDURE — 93010 ELECTROCARDIOGRAM REPORT: CPT

## 2021-12-25 PROCEDURE — 74018 RADEX ABDOMEN 1 VIEW: CPT | Mod: 26

## 2021-12-25 PROCEDURE — 99232 SBSQ HOSP IP/OBS MODERATE 35: CPT

## 2021-12-25 RX ORDER — DEXTROSE MONOHYDRATE, SODIUM CHLORIDE, AND POTASSIUM CHLORIDE 50; .745; 4.5 G/1000ML; G/1000ML; G/1000ML
1000 INJECTION, SOLUTION INTRAVENOUS
Refills: 0 | Status: DISCONTINUED | OUTPATIENT
Start: 2021-12-25 | End: 2021-12-25

## 2021-12-25 RX ORDER — MAGNESIUM SULFATE 500 MG/ML
2 VIAL (ML) INJECTION ONCE
Refills: 0 | Status: COMPLETED | OUTPATIENT
Start: 2021-12-25 | End: 2021-12-25

## 2021-12-25 RX ORDER — SODIUM CHLORIDE 9 MG/ML
1000 INJECTION, SOLUTION INTRAVENOUS
Refills: 0 | Status: DISCONTINUED | OUTPATIENT
Start: 2021-12-25 | End: 2021-12-26

## 2021-12-25 RX ORDER — INSULIN GLARGINE 100 [IU]/ML
24 INJECTION, SOLUTION SUBCUTANEOUS EVERY MORNING
Refills: 0 | Status: DISCONTINUED | OUTPATIENT
Start: 2021-12-25 | End: 2021-12-25

## 2021-12-25 RX ORDER — MAGNESIUM OXIDE 400 MG ORAL TABLET 241.3 MG
400 TABLET ORAL EVERY 4 HOURS
Refills: 0 | Status: COMPLETED | OUTPATIENT
Start: 2021-12-25 | End: 2021-12-25

## 2021-12-25 RX ORDER — POTASSIUM CHLORIDE 20 MEQ
20 PACKET (EA) ORAL ONCE
Refills: 0 | Status: COMPLETED | OUTPATIENT
Start: 2021-12-25 | End: 2021-12-25

## 2021-12-25 RX ORDER — CYCLOBENZAPRINE HYDROCHLORIDE 10 MG/1
10 TABLET, FILM COATED ORAL ONCE
Refills: 0 | Status: COMPLETED | OUTPATIENT
Start: 2021-12-25 | End: 2021-12-25

## 2021-12-25 RX ORDER — INSULIN HUMAN 100 [IU]/ML
4 INJECTION, SOLUTION SUBCUTANEOUS
Qty: 100 | Refills: 0 | Status: DISCONTINUED | OUTPATIENT
Start: 2021-12-25 | End: 2021-12-26

## 2021-12-25 RX ORDER — DIPHENHYDRAMINE HCL 50 MG
50 CAPSULE ORAL ONCE
Refills: 0 | Status: COMPLETED | OUTPATIENT
Start: 2021-12-25 | End: 2021-12-25

## 2021-12-25 RX ORDER — KETOROLAC TROMETHAMINE 30 MG/ML
15 SYRINGE (ML) INJECTION THREE TIMES A DAY
Refills: 0 | Status: DISCONTINUED | OUTPATIENT
Start: 2021-12-25 | End: 2021-12-28

## 2021-12-25 RX ORDER — SODIUM CHLORIDE 9 MG/ML
1000 INJECTION, SOLUTION INTRAVENOUS
Refills: 0 | Status: DISCONTINUED | OUTPATIENT
Start: 2021-12-25 | End: 2021-12-25

## 2021-12-25 RX ORDER — KETOROLAC TROMETHAMINE 30 MG/ML
15 SYRINGE (ML) INJECTION ONCE
Refills: 0 | Status: DISCONTINUED | OUTPATIENT
Start: 2021-12-25 | End: 2021-12-25

## 2021-12-25 RX ADMIN — Medication 650 MILLIGRAM(S): at 09:33

## 2021-12-25 RX ADMIN — Medication 15 MILLIGRAM(S): at 14:41

## 2021-12-25 RX ADMIN — Medication 20 MILLIEQUIVALENT(S): at 05:57

## 2021-12-25 RX ADMIN — Medication 15 MILLIGRAM(S): at 11:30

## 2021-12-25 RX ADMIN — CYCLOBENZAPRINE HYDROCHLORIDE 10 MILLIGRAM(S): 10 TABLET, FILM COATED ORAL at 12:03

## 2021-12-25 RX ADMIN — Medication 2 MILLIGRAM(S): at 15:48

## 2021-12-25 RX ADMIN — Medication 1 APPLICATION(S): at 18:09

## 2021-12-25 RX ADMIN — Medication 50 MILLIGRAM(S): at 15:25

## 2021-12-25 RX ADMIN — Medication 0.5 MILLIGRAM(S): at 14:59

## 2021-12-25 RX ADMIN — SENNA PLUS 1 TABLET(S): 8.6 TABLET ORAL at 10:43

## 2021-12-25 RX ADMIN — CYCLOBENZAPRINE HYDROCHLORIDE 10 MILLIGRAM(S): 10 TABLET, FILM COATED ORAL at 15:26

## 2021-12-25 RX ADMIN — FAMOTIDINE 40 MILLIGRAM(S): 10 INJECTION INTRAVENOUS at 18:04

## 2021-12-25 RX ADMIN — DEXTROSE MONOHYDRATE, SODIUM CHLORIDE, AND POTASSIUM CHLORIDE 150 MILLILITER(S): 50; .745; 4.5 INJECTION, SOLUTION INTRAVENOUS at 00:40

## 2021-12-25 RX ADMIN — MAGNESIUM OXIDE 400 MG ORAL TABLET 400 MILLIGRAM(S): 241.3 TABLET ORAL at 05:57

## 2021-12-25 RX ADMIN — CHLORHEXIDINE GLUCONATE 1 APPLICATION(S): 213 SOLUTION TOPICAL at 11:14

## 2021-12-25 RX ADMIN — MAGNESIUM OXIDE 400 MG ORAL TABLET 400 MILLIGRAM(S): 241.3 TABLET ORAL at 05:58

## 2021-12-25 RX ADMIN — Medication 650 MILLIGRAM(S): at 09:06

## 2021-12-25 RX ADMIN — Medication 1 APPLICATION(S): at 05:56

## 2021-12-25 RX ADMIN — Medication 25 GRAM(S): at 07:53

## 2021-12-25 RX ADMIN — CYCLOBENZAPRINE HYDROCHLORIDE 10 MILLIGRAM(S): 10 TABLET, FILM COATED ORAL at 15:35

## 2021-12-25 RX ADMIN — Medication 15 MILLIGRAM(S): at 10:43

## 2021-12-25 RX ADMIN — Medication 25 GRAM(S): at 14:59

## 2021-12-25 RX ADMIN — ENOXAPARIN SODIUM 40 MILLIGRAM(S): 100 INJECTION SUBCUTANEOUS at 11:31

## 2021-12-25 RX ADMIN — Medication 81 MILLIGRAM(S): at 11:31

## 2021-12-25 RX ADMIN — POLYETHYLENE GLYCOL 3350 17 GRAM(S): 17 POWDER, FOR SOLUTION ORAL at 10:43

## 2021-12-25 RX ADMIN — FAMOTIDINE 40 MILLIGRAM(S): 10 INJECTION INTRAVENOUS at 05:56

## 2021-12-25 RX ADMIN — Medication 15 MILLIGRAM(S): at 14:50

## 2021-12-25 RX ADMIN — Medication 25 MILLIGRAM(S): at 05:55

## 2021-12-25 RX ADMIN — Medication 75 MILLIGRAM(S): at 21:11

## 2021-12-25 RX ADMIN — Medication 10 MILLIGRAM(S): at 10:44

## 2021-12-25 NOTE — CONSULT NOTE ADULT - SUBJECTIVE AND OBJECTIVE BOX
Neurology Consult    Patient is a 36y old  Female who presents with a chief complaint of Mild DKA (25 Dec 2021 09:38)      HPI:  36-year-old, F, patient, PMHx: Type I DM ( since the age of 16 months) on an insulin pump complicated by Diabetic neuropathy , HTN, chronic back pain, migraine headaches,  presents with lower quadrant abdominal pain, nausea and generalized weakness for 3 days. The patient was seen yesterday for the same compliant and d/c after unremarkable workup from the ED. Today the patient represents to the hospital still complaining of lower quadrant abdominal pain. No Nausea, no vomiting. She denies any diarrhea, but reports that a few weeks ago she was having episodes of bloody diarrhea, and was given a diagnosis of colitis ( no further work up was done). The patient reports urinary frequency. No urgency or dysuria.  (23 Dec 2021 13:20)    Neurology team consulted for Abnormal involuntary movements involving her whole body that started today.Patient used to follow up with Dr Engel currently following with Dr Dumont for headache, right hand tremor, polyneuropathy, vulvodynia and clitoral neuropathy. She is on Elavil 75 mg qhs and could not tolerate 100 mg in the past. She tried gabapentin, Cymbalta, pudendal nerve block and creams and none of them were helpful. She developed tremor after using gabapentin. For headache she takes Elavil 75 mg qhs and takes Indomethacin PRN for every few months.Tremors and headaches were controlled during her last visit.        PAST MEDICAL & SURGICAL HISTORY:  Neuropathy  right lower extremity, vaginal    Type 1 diabetes    Degenerative disc disease, thoracic    Urinary tract infection, recurrent    Gastroesophageal reflux disease, esophagitis presence not specified    Intractable headache, unspecified chronicity pattern, unspecified headache type    Major depressive disorder with single episode, in full remission  previously treated with zyprexa, celexa and lexapro    Tremor of right hand    Tachycardia    Spinal stenosis    No significant past surgical history        FAMILY HISTORY:      Social History: (-) x 3    Allergies    amoxicillin (Unknown)  Ceclor (Rash)  ciprofloxacin (Unknown)  clindamycin (Unknown)  flu vaccines (Other)  gabapentin (Unknown)  Influenza Virus Vaccine, H5N1, Inactivated (Unknown)  penicillins (Unknown)    Intolerances        MEDICATIONS  (STANDING):  amitriptyline Oral Tab/Cap - Peds 75 milliGRAM(s) Oral at bedtime  ammonium lactate 12% Lotion 1 Application(s) Topical every 12 hours  aspirin  chewable 81 milliGRAM(s) Oral daily  chlorhexidine 4% Liquid 1 Application(s) Topical daily  dextrose 40% Gel 15 Gram(s) Oral once  dextrose 5% + sodium chloride 0.9%. 1000 milliLiter(s) (200 mL/Hr) IV Continuous <Continuous>  dextrose 5%. 1000 milliLiter(s) (100 mL/Hr) IV Continuous <Continuous>  dextrose 5%. 1000 milliLiter(s) (50 mL/Hr) IV Continuous <Continuous>  dextrose 50% Injectable 25 Gram(s) IV Push once  dextrose 50% Injectable 12.5 Gram(s) IV Push once  dextrose 50% Injectable 25 Gram(s) IV Push once  enoxaparin Injectable 40 milliGRAM(s) SubCutaneous daily  famotidine    Tablet 40 milliGRAM(s) Oral two times a day  glucagon  Injectable 1 milliGRAM(s) IntraMuscular once  insulin regular Infusion 4 Unit(s)/Hr (4 mL/Hr) IV Continuous <Continuous>  LORazepam   Injectable 2 milliGRAM(s) IV Push once  metoprolol tartrate 25 milliGRAM(s) Oral two times a day  polyethylene glycol 3350 17 Gram(s) Oral daily  senna 1 Tablet(s) Oral daily    MEDICATIONS  (PRN):  acetaminophen     Tablet .. 650 milliGRAM(s) Oral every 6 hours PRN Moderate Pain (4 - 6)  bisacodyl Suppository 10 milliGRAM(s) Rectal daily PRN Constipation  cyclobenzaprine 10 milliGRAM(s) Oral three times a day PRN Muscle Spasm  ketorolac   Injectable 15 milliGRAM(s) IV Push three times a day PRN Severe Pain (7 - 10)      Vital Signs Last 24 Hrs  T(C): 36.4 (25 Dec 2021 08:00), Max: 36.4 (25 Dec 2021 08:00)  T(F): 97.5 (25 Dec 2021 08:00), Max: 97.5 (25 Dec 2021 08:00)  HR: 120 (25 Dec 2021 15:00) (94 - 120)  BP: 132/69 (25 Dec 2021 15:00) (98/63 - 155/60)  BP(mean): 92 (25 Dec 2021 15:00) (73 - 110)  RR: 32 (25 Dec 2021 15:00) (0 - 34)  SpO2: 100% (25 Dec 2021 15:00) (95% - 100%)      Neurological Examination:  General:  Appearance is consistent with chronologic age.  No abnormal facies.  Gross skin survey within normal limits.    Cognitive/Language:  Awake, alert, and oriented to person, place, time and date.  Recent and remote memory intact.  Fund of knowledge is appropriate.  Naming, repetition and comprehension intact.   Nondysarthric.    Cranial Nerves  - Eyes: Visual acuity intact, Visual fields full.  EOMI w/o nystagmus, skew or reported double vision.  PERRL.  No ptosis/weakness of eyelid closure.    - Face:  Facial sensation normal V1 - 3, no facial asymmetry.    - Ears/Nose/Throat:  Hearing grossly intact b/l to finger rub.  Palate elevates midline.  Tongue and uvula midline.   Motor examination:  No observable drift. Normal tone and bulk/ B/L upper , abdomen and lower extremities abnormal jerky movements that can be stopped  Sensory examination:  No sensory defecit  Reflexes:   2+ b/l biceps, triceps, brachioradialis, patella and achilles.        Labs:   CBC Full  -  ( 25 Dec 2021 04:30 )  WBC Count : 5.28 K/uL  RBC Count : 3.75 M/uL  Hemoglobin : 10.1 g/dL  Hematocrit : 32.1 %  Platelet Count - Automated : 373 K/uL  Mean Cell Volume : 85.6 fL  Mean Cell Hemoglobin : 26.9 pg  Mean Cell Hemoglobin Concentration : 31.5 g/dL  Auto Neutrophil # : x  Auto Lymphocyte # : x  Auto Monocyte # : x  Auto Eosinophil # : x  Auto Basophil # : x  Auto Neutrophil % : x  Auto Lymphocyte % : x  Auto Monocyte % : x  Auto Eosinophil % : x  Auto Basophil % : x    12-25    133<L>  |  99  |  5<L>  ----------------------------<  240<H>  4.5   |  22  |  0.5<L>    Ca    9.4      25 Dec 2021 11:00  Phos  3.3     12-25  Mg     1.7     12-25    TPro  6.4  /  Alb  3.8  /  TBili  0.3  /  DBili  x   /  AST  30  /  ALT  73<H>  /  AlkPhos  88  12-25    LIVER FUNCTIONS - ( 25 Dec 2021 04:30 )  Alb: 3.8 g/dL / Pro: 6.4 g/dL / ALK PHOS: 88 U/L / ALT: 73 U/L / AST: 30 U/L / GGT: x

## 2021-12-25 NOTE — PROGRESS NOTE ADULT - ASSESSMENT
type 1 diabetes, now stable, can change to insulin glargine 35 units stat now, then 35 units daily in AM, insulinl ispro 15 units before meals 1200 kcal diet, stop all fruit juices,mobilize patient out of bed,. lives in adult home, so when she goes home she will be on insulin injections, until she is seen by diabetes educator, and then can resume pump, this will not happen until next year, so home on insulin injections.

## 2021-12-25 NOTE — CONSULT NOTE ADULT - ASSESSMENT
36-year-old, F, patient, PMHx: Type I DM ( since the age of 16 months) on an insulin pump complicated by Diabetic neuropathy , HTN, chronic back pain, migraine headaches,  presents with lower quadrant abdominal pain, nausea and generalized weakness for 3 days. Admitted for mild DKA  Neurology team consulted for Abnormal involuntary movements involving her whole body that started today. Patient used to follow up with Dr Engle currently following with Dr Dumont for headache, right hand tremor, polyneuropathy, vulvodynia and clitoral neuropathy. She is on Elavil 75 mg qhs and could not tolerate 100 mg in the past. She tried gabapentin, Cymbalta, pudendal nerve block and creams and none of them were helpful. She developed tremor after using gabapentin. Tremors and headaches were controlled during her last visit. She has a history of Major depressive disorder with single episode, in full remission, previously treated with zyprexa, celexa and lexapro.    #Abnormal Involuntary jerky movements  -No impaired awareness  -Ativan 0.5 mg IV for  36-year-old, F, patient, PMHx: Type I DM ( since the age of 16 months) on an insulin pump complicated by Diabetic neuropathy , HTN, chronic back pain, migraine headaches,  presents with lower quadrant abdominal pain, nausea and generalized weakness for 3 days. Admitted for mild DKA  Neurology team consulted for Abnormal involuntary movements involving her whole body that started today. Patient used to follow up with Dr Engle currently following with Dr Dumont for headache, right hand tremor, polyneuropathy, vulvodynia and clitoral neuropathy. She is on Elavil 75 mg qhs and could not tolerate 100 mg in the past. She tried gabapentin, Cymbalta, pudendal nerve block and creams and none of them were helpful. She developed tremor after using gabapentin. Tremors and headaches were controlled during her last visit. She has a history of Major depressive disorder with single episode, in full remission, previously treated with zyprexa, celexa and lexapro.    #Abnormal Involuntary jerky movements  -No impaired awareness  -Ativan 0.5 mg IV once and follow up response  -VEEG in am  -can consider clonazepam 0.5 mg daily after VEEG  -Consider psych consult    Discussed with Attending 36-year-old, F, patient, PMHx: Type I DM ( since the age of 16 months) on an insulin pump complicated by Diabetic neuropathy , HTN, chronic back pain, migraine headaches,  presents with lower quadrant abdominal pain, nausea and generalized weakness for 3 days. Admitted for mild DKA  Neurology team consulted for Abnormal involuntary movements involving her whole body that started today. Patient used to follow up with Dr Engle currently following with Dr Dumont for headache, right hand tremor, polyneuropathy, vulvodynia and clitoral neuropathy. She is on Elavil 75 mg qhs and could not tolerate 100 mg in the past. She tried gabapentin, Cymbalta, pudendal nerve block and creams and none of them were helpful. She developed tremor after using gabapentin. Tremors and headaches were controlled during her last visit. She has a history of Major depressive disorder with single episode, in full remission, previously treated with zyprexa, celexa and lexapro.    #Abnormal Involuntary jerky movements  -No impaired awareness  -Ativan 0.5 mg IV once and follow up response. It was stopped   -VEEG in am. if not available will do REEG in am   -Psych consult for anxiety.     Discussed with Attending

## 2021-12-25 NOTE — CONSULT NOTE ADULT - ATTENDING COMMENTS
Patient known to me from outpatient follow-up. Admittted for DKA. pain chronic but tolerating food.
I have personally seen and examined this patient on 12/26.   I have fully participated in the care of this patient.  I have reviewed all pertinent clinical information, including history, physical exam, plan and note.  Patient with history of tremor and peripheral diabetic neuropathy is here for DKA. Overnight noted to have involuntary jerky shaking movement in her entire body. It was stopped after one dose 0.5 mg Ativan. More suggestive of anxiety. On exam today she has very mild tremor with no parkinsonisms. Low suspicion for seizure but will assess with REEG and VEEG when machine becomes available.  Alternatively could do outpatient 72 hours ambulatory EEG in outpatient setting. Patient has history of anxiety and is not on any anxiety medication. Her migraine is well controlled on Elavil.  I have reviewed all pertinent clinical information and reviewed all relevant imaging and diagnostic studies personally.  Recommendations as above.  Agree with above assessment except as noted.

## 2021-12-25 NOTE — PROGRESS NOTE ADULT - SUBJECTIVE AND OBJECTIVE BOX
Reason for Endocrinology Consult: Diabetes    HPI: 36y Female      PAST MEDICAL & SURGICAL HISTORY:  Neuropathy  right lower extremity, vaginal    Type 1 diabetes    Degenerative disc disease, thoracic    Urinary tract infection, recurrent    Gastroesophageal reflux disease, esophagitis presence not specified    Intractable headache, unspecified chronicity pattern, unspecified headache type    Major depressive disorder with single episode, in full remission  previously treated with zyprexa, celexa and lexapro    Tremor of right hand    Tachycardia    Spinal stenosis    No significant past surgical history      FAMILY HISTORY:      SH:  Smoking  Etoh:  Recreational Drugs:    Home Medications:  amitriptyline 75 mg oral tablet: 1 tab(s) orally once a day (at bedtime) (2021 15:06)  ammonium lactate 12% topical cream: Apply topically to affected area 2 times a day (23 Dec 2021 13:53)  cyclobenzaprine 10 mg oral tablet:  (2021 15:06)  famotidine 40 mg oral tablet: 1 tab(s) orally 2 times a day (23 Dec 2021 13:53)  metoprolol tartrate 25 mg oral tablet: 1 tab(s) orally 2 times a day (2021 15:06)  multivitamin: 1 tab(s) orally once a day (2021 15:06)  naproxen 500 mg oral delayed release tablet: 1 tab(s) orally 2 times a day (2021 15:06)      Current (Non-Endocrine) Meds:  acetaminophen     Tablet .. 650 milliGRAM(s) Oral every 6 hours PRN  amitriptyline Oral Tab/Cap - Peds 75 milliGRAM(s) Oral at bedtime  ammonium lactate 12% Lotion 1 Application(s) Topical every 12 hours  aspirin  chewable 81 milliGRAM(s) Oral daily  bisacodyl Suppository 10 milliGRAM(s) Rectal daily PRN  chlorhexidine 4% Liquid 1 Application(s) Topical daily  cyclobenzaprine 10 milliGRAM(s) Oral three times a day PRN  dextrose 5%. 1000 milliLiter(s) IV Continuous <Continuous>  dextrose 5%. 1000 milliLiter(s) IV Continuous <Continuous>  enoxaparin Injectable 40 milliGRAM(s) SubCutaneous daily  famotidine    Tablet 40 milliGRAM(s) Oral two times a day  ketorolac   Injectable 15 milliGRAM(s) IV Push three times a day PRN  metoprolol tartrate 25 milliGRAM(s) Oral two times a day  polyethylene glycol 3350 17 Gram(s) Oral daily  senna 1 Tablet(s) Oral daily  sodium chloride 0.9% with potassium chloride 20 mEq/L 1000 milliLiter(s) IV Continuous <Continuous>      Current Endocrine Meds:   dextrose 40% Gel 15 Gram(s) Oral once  dextrose 50% Injectable 25 Gram(s) IV Push once  dextrose 50% Injectable 12.5 Gram(s) IV Push once  dextrose 50% Injectable 25 Gram(s) IV Push once  glucagon  Injectable 1 milliGRAM(s) IntraMuscular once  insulin regular Infusion 4 Unit(s)/Hr IV Continuous <Continuous>      Allergies:  amoxicillin (Unknown)  Ceclor (Rash)  ciprofloxacin (Unknown)  clindamycin (Unknown)  flu vaccines (Other)  gabapentin (Unknown)  Influenza Virus Vaccine, H5N1, Inactivated (Unknown)  penicillins (Unknown)      ROS:  Denies the following except as indicated.    General: weight loss/weight gain, decreased appetite, fatigue, fever  Eyes: blurry vision, double vision  ENT: neck swelling, dysphagia, voice changes   CV: palpitations, SOB, chest pain, cough  GI: nausea, vomiting, diarrhea, constipation, abdominal pain  : nocturia,  polyuria, dysuria  Endo: decreased libido, heat/cold intolerance, jitteriness  MSK: arthralgias, myalgias  Skin: rash, dryness, diaphoresis  Neuro: pedal numbness,pedal paresthesias, pedal pain    Height (cm): 162.6 ( @ 08:06), 162.6 ( @ 01:49)  Weight (kg): 78 ( @ 08:06), 78 ( @ 01:49)  BMI (kg/m2): 29.5 ( @ 08:06), 29.5 ( @ 01:49)    Vital Signs Last 24 Hrs  T(C): 36.4 (25 Dec 2021 08:00), Max: 36.5 (24 Dec 2021 12:00)  T(F): 97.5 (25 Dec 2021 08:00), Max: 97.7 (24 Dec 2021 12:00)  HR: 98 (25 Dec 2021 08:00) (94 - 114)  BP: 144/87 (25 Dec 2021 08:00) (98/63 - 155/60)  BP(mean): 104 (25 Dec 2021 08:00) (71 - 110)  RR: 22 (25 Dec 2021 08:00) (0 - 32)  SpO2: 100% (25 Dec 2021 08:33) (95% - 100%)  Constitutional: WN/WD in NAD.   Neck: no thyromegaly or palpable thyroid nodules   Respiratory: lungs CTAB.  Cardiovascular: regular rate and rhythm, normal S1 and S2, no audible murmurs  GI: soft, NT/ND, no masses/HSM appreciated.  Ext: no edema, no ulcers, pedal pulses palpable bilaterally  Neurology: no tremor, monofilament sensation intact in feet  Psychiatric: A&O x 3, normal affect/mood.        LABS:                        10.1   5.28  )-----------( 373      ( 25 Dec 2021 04:30 )             32.1     12-25    139  |  103  |  5<L>  ----------------------------<  235<H>  4.4   |  21  |  0.5<L>    Ca    8.8      25 Dec 2021 04:30  Phos  3.3     12-25  Mg     1.7     12-25    TPro  6.4  /  Alb  3.8  /  TBili  0.3  /  DBili  x   /  AST  30  /  ALT  73<H>  /  AlkPhos  88  12-25      Urinalysis Basic - ( 23 Dec 2021 09:44 )    Color: Yellow / Appearance: Clear / S.037 / pH: x  Gluc: x / Ketone: Large  / Bili: Negative / Urobili: <2 mg/dL   Blood: x / Protein: Trace / Nitrite: Negative   Leuk Esterase: Negative / RBC: x / WBC x   Sq Epi: x / Non Sq Epi: x / Bacteria: x                                     RADIOLOGY & ADDITIONAL STUDIES:    A/P:36yFemale

## 2021-12-25 NOTE — PROGRESS NOTE ADULT - ASSESSMENT
IMPRESSION:    DKA resolved  Abdominal pain SP CTA   HO DM     PLAN:    CNS:  No depressants     HEENT: Oral care    PULMONARY:  HOB @ 45 degrees.  Aspiration precautions.      CARDIOVASCULAR:  Decrease IVF and DC once tolerating PO     GI: GI prophylaxis.  Feeding.  Bowel regimen.  GI eval     RENAL:  Follow up lytes.  Correct as needed    INFECTIOUS DISEASE: Follow up cultures    HEMATOLOGICAL:  DVT prophylaxis.  Dimer     ENDOCRINE:  Follow up FS.  Insulin protocol if needed.  Switch to SQ insulin once tolerating PO     MUSCULOSKELETAL:  OOB to chair     Downgrade to floor          IMPRESSION:    DKA   Colitis   HO DM     PLAN:    CNS:  No depressants     HEENT: Oral care    PULMONARY:  HOB @ 45 degrees.  Aspiration precautions.      CARDIOVASCULAR:  Decrease IVF and DC once tolerating PO     GI: GI prophylaxis.  Feeding.  Bowel regimen.  GI f/u, RUQ sono unremarkable, no bowel movements reportedly for 5 days, increase lactulose add miralax, suppository, KUB     RENAL:  Follow up lytes.  Correct as needed    INFECTIOUS DISEASE: Follow up cultures    HEMATOLOGICAL:  DVT prophylaxis.  Dimer     ENDOCRINE:  Follow up FS.  Insulin protocol if needed.      MUSCULOSKELETAL:  OOB to chair     MICU monitoring          IMPRESSION:    DKA   Colitis   HO DM     PLAN:    CNS:  No depressants     HEENT: Oral care    PULMONARY:  HOB @ 45 degrees.  Aspiration precautions.      CARDIOVASCULAR:  Decrease IVF and DC once tolerating PO     GI: GI prophylaxis.  Feeding.  Bowel regimen.  GI f/u, RUQ sono unremarkable, no bowel movements reportedly for 5 days, increase lactulose add miralax, suppository, KUB     RENAL:  Follow up lytes.  Correct as needed    INFECTIOUS DISEASE: Follow up cultures    HEMATOLOGICAL:  DVT prophylaxis.  Dimer     ENDOCRINE:  Follow up FS.  can change to SQ Insulin once taking PO well  MUSCULOSKELETAL:  OOB to chair     MICU monitoring

## 2021-12-25 NOTE — PROGRESS NOTE ADULT - SUBJECTIVE AND OBJECTIVE BOX
Patient is a 36y old  Female who presents with a chief complaint of Mild DKA (24 Dec 2021 10:16)        Over Night Events:        ROS:     All pertinent ROS are negative except HPI         PHYSICAL EXAM    ICU Vital Signs Last 24 Hrs  T(C): 36.1 (25 Dec 2021 04:00), Max: 36.5 (24 Dec 2021 12:00)  T(F): 96.9 (25 Dec 2021 04:00), Max: 97.7 (24 Dec 2021 12:00)  HR: 106 (25 Dec 2021 06:00) (94 - 114)  BP: 135/81 (25 Dec 2021 06:00) (98/46 - 155/60)  BP(mean): 102 (25 Dec 2021 06:00) (66 - 110)  ABP: --  ABP(mean): --  RR: 24 (25 Dec 2021 06:00) (0 - 32)  SpO2: 99% (25 Dec 2021 06:00) (95% - 100%)      CONSTITUTIONAL:   Ill appearing.  Well nourished.  NAD    ENT:   Airway patent,   Mouth with normal mucosa.   No thrush    EYES:   Pupils equal,   Round and reactive to light.    CARDIAC:   Normal rate,   Regular rhythm.    No edema      Vascular:  Normal systolic impulse  No Carotid bruits    RESPIRATORY:   No wheezing  Bilateral BS  Normal chest expansion  Not tachypneic,  No use of accessory muscles    GASTROINTESTINAL:  Abdomen soft,   Non-tender,   No guarding,   + BS    GENITOURINARY  normal genitalia for sex  no edema    MUSCULOSKELETAL:   Range of motion is not limited,  No clubbing, cyanosis    NEUROLOGICAL:   Alert and oriented   No motor  deficits.  pertinent DTRs normal    SKIN:   Skin normal color for race,   Warm and dry  No evidence of rash.    PSYCHIATRIC:   Normal mood and affect.   No apparent risk to self or others.    HEMATOLOGICAL:  No cervical  lymphadenopathy.  no inguinal lymphadenopathy      21 @ 07:01  -  21 @ 07:00  --------------------------------------------------------  IN:    dextrose 5% + lactated ringers w/ Additives: 300 mL    dextrose 5% + lactated ringers w/ Additives: 750 mL    dextrose 5% + lactated ringers w/ Additives: 600 mL    Insulin: 26 mL    sodium chloride 0.9% w/ Additives: 350 mL  Total IN:  mL    OUT:  Total OUT: 0 mL    Total NET:  mL          LABS:                            10.1   5.28  )-----------( 373      ( 25 Dec 2021 04:30 )             32.1                                                   139  |  103  |  5<L>  ----------------------------<  235<H>  4.4   |  21  |  0.5<L>    Ca    8.8      25 Dec 2021 04:30  Phos  3.3       Mg     1.7         TPro  6.4  /  Alb  3.8  /  TBili  0.3  /  DBili  x   /  AST  30  /  ALT  73<H>  /  AlkPhos  88                                               Urinalysis Basic - ( 23 Dec 2021 09:44 )    Color: Yellow / Appearance: Clear / S.037 / pH: x  Gluc: x / Ketone: Large  / Bili: Negative / Urobili: <2 mg/dL   Blood: x / Protein: Trace / Nitrite: Negative   Leuk Esterase: Negative / RBC: x / WBC x   Sq Epi: x / Non Sq Epi: x / Bacteria: x        CARDIAC MARKERS ( 23 Dec 2021 16:52 )  x     / <0.01 ng/mL / x     / x     / x      CARDIAC MARKERS ( 23 Dec 2021 09:12 )  x     / <0.01 ng/mL / x     / x     / x                                                LIVER FUNCTIONS - ( 25 Dec 2021 04:30 )  Alb: 3.8 g/dL / Pro: 6.4 g/dL / ALK PHOS: 88 U/L / ALT: 73 U/L / AST: 30 U/L / GGT: x                                                                                                                                       MEDICATIONS  (STANDING):  amitriptyline Oral Tab/Cap - Peds 75 milliGRAM(s) Oral at bedtime  ammonium lactate 12% Lotion 1 Application(s) Topical every 12 hours  aspirin  chewable 81 milliGRAM(s) Oral daily  chlorhexidine 4% Liquid 1 Application(s) Topical daily  dextrose 40% Gel 15 Gram(s) Oral once  dextrose 5%. 1000 milliLiter(s) (100 mL/Hr) IV Continuous <Continuous>  dextrose 5%. 1000 milliLiter(s) (50 mL/Hr) IV Continuous <Continuous>  dextrose 50% Injectable 25 Gram(s) IV Push once  dextrose 50% Injectable 12.5 Gram(s) IV Push once  dextrose 50% Injectable 25 Gram(s) IV Push once  enoxaparin Injectable 40 milliGRAM(s) SubCutaneous daily  famotidine    Tablet 40 milliGRAM(s) Oral two times a day  glucagon  Injectable 1 milliGRAM(s) IntraMuscular once  insulin regular Infusion 4 Unit(s)/Hr (4 mL/Hr) IV Continuous <Continuous>  magnesium sulfate  IVPB 2 Gram(s) IV Intermittent once  metoprolol tartrate 25 milliGRAM(s) Oral two times a day  polyethylene glycol 3350 17 Gram(s) Oral daily  senna 1 Tablet(s) Oral daily  sodium chloride 0.9% with potassium chloride 20 mEq/L 1000 milliLiter(s) (200 mL/Hr) IV Continuous <Continuous>    MEDICATIONS  (PRN):  acetaminophen     Tablet .. 650 milliGRAM(s) Oral every 6 hours PRN Moderate Pain (4 - 6)  cyclobenzaprine 10 milliGRAM(s) Oral three times a day PRN Muscle Spasm      New X-rays reviewed:                                                                                  ECHO    CXR interpreted by me:       Patient is a 36y old  Female who presents with a chief complaint of Mild DKA (24 Dec 2021 10:16)        Over Night Events: AG opened overnight         ROS:     All pertinent ROS are negative except HPI         PHYSICAL EXAM    ICU Vital Signs Last 24 Hrs  T(C): 36.1 (25 Dec 2021 04:00), Max: 36.5 (24 Dec 2021 12:00)  T(F): 96.9 (25 Dec 2021 04:00), Max: 97.7 (24 Dec 2021 12:00)  HR: 106 (25 Dec 2021 06:00) (94 - 114)  BP: 135/81 (25 Dec 2021 06:00) (98/46 - 155/60)  BP(mean): 102 (25 Dec 2021 06:00) (66 - 110)  ABP: --  ABP(mean): --  RR: 24 (25 Dec 2021 06:00) (0 - 32)  SpO2: 99% (25 Dec 2021 06:00) (95% - 100%)      CONSTITUTIONAL:  NAD    ENT:   Airway patent,   Mouth with normal mucosa.   No thrush    EYES:   Pupils equal,   Round and reactive to light.    CARDIAC:   Normal rate,   Regular rhythm.    No edema      Vascular:  Normal systolic impulse  No Carotid bruits    RESPIRATORY:   No wheezing  Bilateral BS  Normal chest expansion  Not tachypneic,  No use of accessory muscles    GASTROINTESTINAL:  Abdomen soft,   Non-tender,   No guarding,   + BS    GENITOURINARY  normal genitalia for sex  no edema    MUSCULOSKELETAL:   Range of motion is not limited,  No clubbing, cyanosis    NEUROLOGICAL:   Alert and oriented   No motor  deficits.  pertinent DTRs normal    SKIN:   Skin normal color for race,   Warm and dry  No evidence of rash.    PSYCHIATRIC:   Normal mood and affect.   No apparent risk to self or others.    HEMATOLOGICAL:  No cervical  lymphadenopathy.  no inguinal lymphadenopathy      21 @ 07:01  -  21 @ 07:00  --------------------------------------------------------  IN:    dextrose 5% + lactated ringers w/ Additives: 300 mL    dextrose 5% + lactated ringers w/ Additives: 750 mL    dextrose 5% + lactated ringers w/ Additives: 600 mL    Insulin: 26 mL    sodium chloride 0.9% w/ Additives: 350 mL  Total IN:  mL    OUT:  Total OUT: 0 mL    Total NET:  mL          LABS:                            10.1   5.28  )-----------( 373      ( 25 Dec 2021 04:30 )             32.1                                                   139  |  103  |  5<L>  ----------------------------<  235<H>  4.4   |  21  |  0.5<L>    Ca    8.8      25 Dec 2021 04:30  Phos  3.3       Mg     1.7         TPro  6.4  /  Alb  3.8  /  TBili  0.3  /  DBili  x   /  AST  30  /  ALT  73<H>  /  AlkPhos  88                                               Urinalysis Basic - ( 23 Dec 2021 09:44 )    Color: Yellow / Appearance: Clear / S.037 / pH: x  Gluc: x / Ketone: Large  / Bili: Negative / Urobili: <2 mg/dL   Blood: x / Protein: Trace / Nitrite: Negative   Leuk Esterase: Negative / RBC: x / WBC x   Sq Epi: x / Non Sq Epi: x / Bacteria: x        CARDIAC MARKERS ( 23 Dec 2021 16:52 )  x     / <0.01 ng/mL / x     / x     / x      CARDIAC MARKERS ( 23 Dec 2021 09:12 )  x     / <0.01 ng/mL / x     / x     / x                                                LIVER FUNCTIONS - ( 25 Dec 2021 04:30 )  Alb: 3.8 g/dL / Pro: 6.4 g/dL / ALK PHOS: 88 U/L / ALT: 73 U/L / AST: 30 U/L / GGT: x                                                                                                                                       MEDICATIONS  (STANDING):  amitriptyline Oral Tab/Cap - Peds 75 milliGRAM(s) Oral at bedtime  ammonium lactate 12% Lotion 1 Application(s) Topical every 12 hours  aspirin  chewable 81 milliGRAM(s) Oral daily  chlorhexidine 4% Liquid 1 Application(s) Topical daily  dextrose 40% Gel 15 Gram(s) Oral once  dextrose 5%. 1000 milliLiter(s) (100 mL/Hr) IV Continuous <Continuous>  dextrose 5%. 1000 milliLiter(s) (50 mL/Hr) IV Continuous <Continuous>  dextrose 50% Injectable 25 Gram(s) IV Push once  dextrose 50% Injectable 12.5 Gram(s) IV Push once  dextrose 50% Injectable 25 Gram(s) IV Push once  enoxaparin Injectable 40 milliGRAM(s) SubCutaneous daily  famotidine    Tablet 40 milliGRAM(s) Oral two times a day  glucagon  Injectable 1 milliGRAM(s) IntraMuscular once  insulin regular Infusion 4 Unit(s)/Hr (4 mL/Hr) IV Continuous <Continuous>  magnesium sulfate  IVPB 2 Gram(s) IV Intermittent once  metoprolol tartrate 25 milliGRAM(s) Oral two times a day  polyethylene glycol 3350 17 Gram(s) Oral daily  senna 1 Tablet(s) Oral daily  sodium chloride 0.9% with potassium chloride 20 mEq/L 1000 milliLiter(s) (200 mL/Hr) IV Continuous <Continuous>    MEDICATIONS  (PRN):  acetaminophen     Tablet .. 650 milliGRAM(s) Oral every 6 hours PRN Moderate Pain (4 - 6)  cyclobenzaprine 10 milliGRAM(s) Oral three times a day PRN Muscle Spasm

## 2021-12-25 NOTE — CHART NOTE - NSCHARTNOTEFT_GEN_A_CORE
Around 2-3pm, notified by RN that patient was having "spasmic movements" of the stomach. Went to see patient and she started having involuntary tremors of her arms, head, neck, and legs. Patient then started complaining that she "could not breath" due to the movements she was making. Mother was at bedside when this was happening. No stridor on physical exam. Gave benadryl x1. Consulted neuro. On exam, patient's movements can be stopped if hand is placed on area. Video was recorded and discussed with neuro team. Movements may be psychogenic. Consulted psych. Placed patient back into bed for seizure precaution. Ordered vEEG. Did 1x 2mg IV ativan which subsided her movements.

## 2021-12-26 LAB
ALBUMIN SERPL ELPH-MCNC: 3.2 G/DL — LOW (ref 3.5–5.2)
ALP SERPL-CCNC: 93 U/L — SIGNIFICANT CHANGE UP (ref 30–115)
ALT FLD-CCNC: 195 U/L — HIGH (ref 0–41)
ANION GAP SERPL CALC-SCNC: 13 MMOL/L — SIGNIFICANT CHANGE UP (ref 7–14)
ANION GAP SERPL CALC-SCNC: 14 MMOL/L — SIGNIFICANT CHANGE UP (ref 7–14)
ANION GAP SERPL CALC-SCNC: 14 MMOL/L — SIGNIFICANT CHANGE UP (ref 7–14)
AST SERPL-CCNC: 175 U/L — HIGH (ref 0–41)
BASOPHILS # BLD AUTO: 0.02 K/UL — SIGNIFICANT CHANGE UP (ref 0–0.2)
BASOPHILS NFR BLD AUTO: 0.2 % — SIGNIFICANT CHANGE UP (ref 0–1)
BILIRUB SERPL-MCNC: 0.2 MG/DL — SIGNIFICANT CHANGE UP (ref 0.2–1.2)
BUN SERPL-MCNC: 8 MG/DL — LOW (ref 10–20)
BUN SERPL-MCNC: 8 MG/DL — LOW (ref 10–20)
BUN SERPL-MCNC: 9 MG/DL — LOW (ref 10–20)
CALCIUM SERPL-MCNC: 7.9 MG/DL — LOW (ref 8.5–10.1)
CALCIUM SERPL-MCNC: 8 MG/DL — LOW (ref 8.5–10.1)
CALCIUM SERPL-MCNC: 8.3 MG/DL — LOW (ref 8.5–10.1)
CHLORIDE SERPL-SCNC: 106 MMOL/L — SIGNIFICANT CHANGE UP (ref 98–110)
CHLORIDE SERPL-SCNC: 107 MMOL/L — SIGNIFICANT CHANGE UP (ref 98–110)
CHLORIDE SERPL-SCNC: 109 MMOL/L — SIGNIFICANT CHANGE UP (ref 98–110)
CO2 SERPL-SCNC: 17 MMOL/L — SIGNIFICANT CHANGE UP (ref 17–32)
CO2 SERPL-SCNC: 17 MMOL/L — SIGNIFICANT CHANGE UP (ref 17–32)
CO2 SERPL-SCNC: 18 MMOL/L — SIGNIFICANT CHANGE UP (ref 17–32)
CREAT SERPL-MCNC: 0.5 MG/DL — LOW (ref 0.7–1.5)
CREAT SERPL-MCNC: 0.6 MG/DL — LOW (ref 0.7–1.5)
CREAT SERPL-MCNC: 0.6 MG/DL — LOW (ref 0.7–1.5)
CULTURE RESULTS: SIGNIFICANT CHANGE UP
EOSINOPHIL # BLD AUTO: 0.06 K/UL — SIGNIFICANT CHANGE UP (ref 0–0.7)
EOSINOPHIL NFR BLD AUTO: 0.5 % — SIGNIFICANT CHANGE UP (ref 0–8)
GLUCOSE BLDC GLUCOMTR-MCNC: 115 MG/DL — HIGH (ref 70–99)
GLUCOSE BLDC GLUCOMTR-MCNC: 124 MG/DL — HIGH (ref 70–99)
GLUCOSE BLDC GLUCOMTR-MCNC: 142 MG/DL — HIGH (ref 70–99)
GLUCOSE BLDC GLUCOMTR-MCNC: 148 MG/DL — HIGH (ref 70–99)
GLUCOSE BLDC GLUCOMTR-MCNC: 166 MG/DL — HIGH (ref 70–99)
GLUCOSE BLDC GLUCOMTR-MCNC: 168 MG/DL — HIGH (ref 70–99)
GLUCOSE BLDC GLUCOMTR-MCNC: 183 MG/DL — HIGH (ref 70–99)
GLUCOSE BLDC GLUCOMTR-MCNC: 189 MG/DL — HIGH (ref 70–99)
GLUCOSE BLDC GLUCOMTR-MCNC: 197 MG/DL — HIGH (ref 70–99)
GLUCOSE BLDC GLUCOMTR-MCNC: 203 MG/DL — HIGH (ref 70–99)
GLUCOSE BLDC GLUCOMTR-MCNC: 211 MG/DL — HIGH (ref 70–99)
GLUCOSE BLDC GLUCOMTR-MCNC: 282 MG/DL — HIGH (ref 70–99)
GLUCOSE BLDC GLUCOMTR-MCNC: 63 MG/DL — LOW (ref 70–99)
GLUCOSE BLDC GLUCOMTR-MCNC: 65 MG/DL — LOW (ref 70–99)
GLUCOSE SERPL-MCNC: 158 MG/DL — HIGH (ref 70–99)
GLUCOSE SERPL-MCNC: 213 MG/DL — HIGH (ref 70–99)
GLUCOSE SERPL-MCNC: 243 MG/DL — HIGH (ref 70–99)
HCT VFR BLD CALC: 28.9 % — LOW (ref 37–47)
HGB BLD-MCNC: 9.3 G/DL — LOW (ref 12–16)
IMM GRANULOCYTES NFR BLD AUTO: 0.6 % — HIGH (ref 0.1–0.3)
LYMPHOCYTES # BLD AUTO: 1.18 K/UL — LOW (ref 1.2–3.4)
LYMPHOCYTES # BLD AUTO: 10.5 % — LOW (ref 20.5–51.1)
MAGNESIUM SERPL-MCNC: 1.9 MG/DL — SIGNIFICANT CHANGE UP (ref 1.8–2.4)
MCHC RBC-ENTMCNC: 27.1 PG — SIGNIFICANT CHANGE UP (ref 27–31)
MCHC RBC-ENTMCNC: 32.2 G/DL — SIGNIFICANT CHANGE UP (ref 32–37)
MCV RBC AUTO: 84.3 FL — SIGNIFICANT CHANGE UP (ref 81–99)
MONOCYTES # BLD AUTO: 0.72 K/UL — HIGH (ref 0.1–0.6)
MONOCYTES NFR BLD AUTO: 6.4 % — SIGNIFICANT CHANGE UP (ref 1.7–9.3)
NEUTROPHILS # BLD AUTO: 9.24 K/UL — HIGH (ref 1.4–6.5)
NEUTROPHILS NFR BLD AUTO: 81.8 % — HIGH (ref 42.2–75.2)
NRBC # BLD: 0 /100 WBCS — SIGNIFICANT CHANGE UP (ref 0–0)
PHOSPHATE SERPL-MCNC: 2.8 MG/DL — SIGNIFICANT CHANGE UP (ref 2.1–4.9)
PLATELET # BLD AUTO: 361 K/UL — SIGNIFICANT CHANGE UP (ref 130–400)
POTASSIUM SERPL-MCNC: 4.1 MMOL/L — SIGNIFICANT CHANGE UP (ref 3.5–5)
POTASSIUM SERPL-MCNC: 4.4 MMOL/L — SIGNIFICANT CHANGE UP (ref 3.5–5)
POTASSIUM SERPL-MCNC: 4.5 MMOL/L — SIGNIFICANT CHANGE UP (ref 3.5–5)
POTASSIUM SERPL-SCNC: 4.1 MMOL/L — SIGNIFICANT CHANGE UP (ref 3.5–5)
POTASSIUM SERPL-SCNC: 4.4 MMOL/L — SIGNIFICANT CHANGE UP (ref 3.5–5)
POTASSIUM SERPL-SCNC: 4.5 MMOL/L — SIGNIFICANT CHANGE UP (ref 3.5–5)
PROT SERPL-MCNC: 5.7 G/DL — LOW (ref 6–8)
RBC # BLD: 3.43 M/UL — LOW (ref 4.2–5.4)
RBC # FLD: 14.8 % — HIGH (ref 11.5–14.5)
SODIUM SERPL-SCNC: 138 MMOL/L — SIGNIFICANT CHANGE UP (ref 135–146)
SODIUM SERPL-SCNC: 138 MMOL/L — SIGNIFICANT CHANGE UP (ref 135–146)
SODIUM SERPL-SCNC: 139 MMOL/L — SIGNIFICANT CHANGE UP (ref 135–146)
SPECIMEN SOURCE: SIGNIFICANT CHANGE UP
WBC # BLD: 11.29 K/UL — HIGH (ref 4.8–10.8)
WBC # FLD AUTO: 11.29 K/UL — HIGH (ref 4.8–10.8)

## 2021-12-26 PROCEDURE — 95819 EEG AWAKE AND ASLEEP: CPT | Mod: 26

## 2021-12-26 PROCEDURE — 99291 CRITICAL CARE FIRST HOUR: CPT

## 2021-12-26 PROCEDURE — 99232 SBSQ HOSP IP/OBS MODERATE 35: CPT

## 2021-12-26 RX ORDER — INSULIN LISPRO 100/ML
4 VIAL (ML) SUBCUTANEOUS ONCE
Refills: 0 | Status: COMPLETED | OUTPATIENT
Start: 2021-12-26 | End: 2021-12-26

## 2021-12-26 RX ORDER — INSULIN LISPRO 100/ML
15 VIAL (ML) SUBCUTANEOUS ONCE
Refills: 0 | Status: COMPLETED | OUTPATIENT
Start: 2021-12-26 | End: 2021-12-26

## 2021-12-26 RX ORDER — FLUCONAZOLE 150 MG/1
200 TABLET ORAL ONCE
Refills: 0 | Status: DISCONTINUED | OUTPATIENT
Start: 2021-12-26 | End: 2021-12-26

## 2021-12-26 RX ORDER — INSULIN GLARGINE 100 [IU]/ML
24 INJECTION, SOLUTION SUBCUTANEOUS EVERY MORNING
Refills: 0 | Status: DISCONTINUED | OUTPATIENT
Start: 2021-12-26 | End: 2021-12-26

## 2021-12-26 RX ORDER — ESCITALOPRAM OXALATE 10 MG/1
5 TABLET, FILM COATED ORAL DAILY
Refills: 0 | Status: DISCONTINUED | OUTPATIENT
Start: 2021-12-26 | End: 2022-01-05

## 2021-12-26 RX ORDER — FLUCONAZOLE 150 MG/1
150 TABLET ORAL ONCE
Refills: 0 | Status: COMPLETED | OUTPATIENT
Start: 2021-12-26 | End: 2021-12-26

## 2021-12-26 RX ORDER — INSULIN LISPRO 100/ML
15 VIAL (ML) SUBCUTANEOUS
Refills: 0 | Status: DISCONTINUED | OUTPATIENT
Start: 2021-12-26 | End: 2021-12-27

## 2021-12-26 RX ORDER — INSULIN LISPRO 100/ML
VIAL (ML) SUBCUTANEOUS
Refills: 0 | Status: DISCONTINUED | OUTPATIENT
Start: 2021-12-26 | End: 2021-12-27

## 2021-12-26 RX ORDER — INSULIN GLARGINE 100 [IU]/ML
35 INJECTION, SOLUTION SUBCUTANEOUS EVERY MORNING
Refills: 0 | Status: DISCONTINUED | OUTPATIENT
Start: 2021-12-27 | End: 2021-12-27

## 2021-12-26 RX ADMIN — Medication 15 UNIT(S): at 11:48

## 2021-12-26 RX ADMIN — SENNA PLUS 1 TABLET(S): 8.6 TABLET ORAL at 11:12

## 2021-12-26 RX ADMIN — FAMOTIDINE 40 MILLIGRAM(S): 10 INJECTION INTRAVENOUS at 05:50

## 2021-12-26 RX ADMIN — Medication 4 UNIT(S): at 22:13

## 2021-12-26 RX ADMIN — Medication 25 MILLIGRAM(S): at 05:49

## 2021-12-26 RX ADMIN — ENOXAPARIN SODIUM 40 MILLIGRAM(S): 100 INJECTION SUBCUTANEOUS at 11:12

## 2021-12-26 RX ADMIN — Medication 650 MILLIGRAM(S): at 19:27

## 2021-12-26 RX ADMIN — Medication 75 MILLIGRAM(S): at 21:43

## 2021-12-26 RX ADMIN — CHLORHEXIDINE GLUCONATE 1 APPLICATION(S): 213 SOLUTION TOPICAL at 11:50

## 2021-12-26 RX ADMIN — FLUCONAZOLE 150 MILLIGRAM(S): 150 TABLET ORAL at 16:09

## 2021-12-26 RX ADMIN — POLYETHYLENE GLYCOL 3350 17 GRAM(S): 17 POWDER, FOR SOLUTION ORAL at 11:13

## 2021-12-26 RX ADMIN — Medication 15 MILLIGRAM(S): at 05:50

## 2021-12-26 RX ADMIN — INSULIN GLARGINE 24 UNIT(S): 100 INJECTION, SOLUTION SUBCUTANEOUS at 03:34

## 2021-12-26 RX ADMIN — Medication 81 MILLIGRAM(S): at 11:12

## 2021-12-26 RX ADMIN — Medication 650 MILLIGRAM(S): at 20:00

## 2021-12-26 RX ADMIN — FAMOTIDINE 40 MILLIGRAM(S): 10 INJECTION INTRAVENOUS at 17:53

## 2021-12-26 RX ADMIN — Medication 1 APPLICATION(S): at 17:53

## 2021-12-26 RX ADMIN — Medication 25 MILLIGRAM(S): at 17:52

## 2021-12-26 RX ADMIN — Medication 15 MILLIGRAM(S): at 06:29

## 2021-12-26 RX ADMIN — Medication 4: at 11:48

## 2021-12-26 RX ADMIN — Medication 15 UNIT(S): at 09:30

## 2021-12-26 NOTE — PROGRESS NOTE ADULT - ASSESSMENT
type 1 diabetes, it seems to be fairly well controlled on current regimen, please add an extre 10 units glargine at bedtime, in addition to am glargine. patient can go back to adult home with basaglar kwik pen as glargine, and admelog solostar pen as insulin lispro, needs a box of pen needles, she will follow up in endocrinology clinic.

## 2021-12-26 NOTE — BEHAVIORAL HEALTH ASSESSMENT NOTE - NSBHCHARTREVIEWVS_PSY_A_CORE FT
Vital Signs Last 24 Hrs  T(C): 37.3 (26 Dec 2021 04:00), Max: 37.3 (26 Dec 2021 04:00)  T(F): 99.1 (26 Dec 2021 04:00), Max: 99.1 (26 Dec 2021 04:00)  HR: 102 (26 Dec 2021 11:00) (96 - 128)  BP: 120/76 (26 Dec 2021 11:00) (79/53 - 151/64)  BP(mean): 88 (26 Dec 2021 11:00) (55 - 99)  RR: 13 (26 Dec 2021 11:00) (13 - 34)  SpO2: 99% (26 Dec 2021 11:00) (95% - 100%)

## 2021-12-26 NOTE — BEHAVIORAL HEALTH ASSESSMENT NOTE - RISK ASSESSMENT
risk factors: hx of suicide attempts, hx of mood disorder, acute medical issues, not engaged in school or work, dissatisfied with living conditions     protective: care seeking behaviors, not suicidal Low Acute Suicide Risk

## 2021-12-26 NOTE — PROGRESS NOTE ADULT - SUBJECTIVE AND OBJECTIVE BOX
Patient is a 36y old  Female who presents with a chief complaint of Mild DKA (25 Dec 2021 15:37)        Over Night Events:  Full body tonic clonic clonic activity while awake, s/p ativan s/p cyclobenzaprine, toradol, Mg. Not postictal after.    No overnight events, tolerating feeds.      ROS:  See HPI    PHYSICAL EXAM    ICU Vital Signs Last 24 Hrs  T(C): 37.3 (26 Dec 2021 04:00), Max: 37.3 (26 Dec 2021 04:00)  T(F): 99.1 (26 Dec 2021 04:00), Max: 99.1 (26 Dec 2021 04:00)  HR: 98 (26 Dec 2021 09:00) (96 - 128)  BP: 97/60 (26 Dec 2021 09:00) (79/53 - 151/64)  BP(mean): 76 (26 Dec 2021 09:00) (55 - 99)  ABP: --  ABP(mean): --  RR: 21 (26 Dec 2021 09:00) (16 - 34)  SpO2: 100% (26 Dec 2021 09:19) (95% - 100%)      12-25-21 @ 07:01  -  12-26-21 @ 07:00  --------------------------------------------------------  IN:    dextrose 5% + sodium chloride 0.9%: 2800 mL    Insulin: 20 mL    Insulin: 14 mL    Insulin: 61 mL    IV PiggyBack: 100 mL    sodium chloride 0.9% w/ Additives: 1400 mL  Total IN: 4395 mL    OUT:    Voided (mL): 400 mL  Total OUT: 400 mL    Total NET: 3995 mL            CONSTITUTIONAL:  In NAD    ENT:   Airway patent,   No thrush    EYES:   Clear bilaterally,   pupils equal,   round and reactive to light.    CARDIAC:   Normal rate,   regular rhythm.    no edema      CAROTID:   normal systolic impulse  no bruits    RESPIRATORY:   No wheezing  Normal chest expansion  Not tachypneic,  No use of accessory muscles    GASTROINTESTINAL:  Abdomen soft,   non-tender,   no guarding,   + BS    MUSCULOSKELETAL:   range of motion is not limited,  no clubbing, cyanosis    NEUROLOGICAL:   Alert and oriented   no motor deficits.    SKIN:   Skin normal color for race,   No evidence of rash.    PSYCHIATRIC:   normal mood and affect.   no apparent risk to self or others.        LABS:                            9.3    11.29 )-----------( 361      ( 26 Dec 2021 04:46 )             28.9                                               12-26    139  |  109  |  8<L>  ----------------------------<  158<H>  4.1   |  17  |  0.6<L>    Ca    7.9<L>      26 Dec 2021 04:46  Phos  2.8     12-26  Mg     1.9     12-26    TPro  5.7<L>  /  Alb  3.2<L>  /  TBili  0.2  /  DBili  x   /  AST  175<H>  /  ALT  195<H>  /  AlkPhos  93  12-26                                                                                           LIVER FUNCTIONS - ( 26 Dec 2021 04:46 )  Alb: 3.2 g/dL / Pro: 5.7 g/dL / ALK PHOS: 93 U/L / ALT: 195 U/L / AST: 175 U/L / GGT: x                    Procalcitonin, Serum: 0.49 ng/mL (12-24-21 @ 11:00)  D-Dimer Assay, Quantitative: 13 ng/mL DDU (12-24-21 @ 11:00)  Ferritin, Serum: 9 ng/mL (12-23-21 @ 16:52)                    POCT Blood Glucose.: 203 mg/dL (12-26-21 @ 09:27)  POCT Blood Glucose.: 166 mg/dL (12-26-21 @ 08:00)  POCT Blood Glucose.: 124 mg/dL (12-26-21 @ 06:59)  POCT Blood Glucose.: 142 mg/dL (12-26-21 @ 06:07)  POCT Blood Glucose.: 148 mg/dL (12-26-21 @ 04:58)  POCT Blood Glucose.: 168 mg/dL (12-26-21 @ 03:54)  POCT Blood Glucose.: 183 mg/dL (12-26-21 @ 02:55)  POCT Blood Glucose.: 197 mg/dL (12-26-21 @ 01:57)  POCT Blood Glucose.: 189 mg/dL (12-26-21 @ 00:55)  POCT Blood Glucose.: 245 mg/dL (12-25-21 @ 23:53)  POCT Blood Glucose.: 285 mg/dL (12-25-21 @ 22:59)  POCT Blood Glucose.: 300 mg/dL (12-25-21 @ 22:14)  POCT Blood Glucose.: 279 mg/dL (12-25-21 @ 20:54)  POCT Blood Glucose.: 224 mg/dL (12-25-21 @ 19:58)  POCT Blood Glucose.: 223 mg/dL (12-25-21 @ 18:57)  POCT Blood Glucose.: 216 mg/dL (12-25-21 @ 18:06)  POCT Blood Glucose.: 229 mg/dL (12-25-21 @ 17:04)  POCT Blood Glucose.: 229 mg/dL (12-25-21 @ 16:02)  POCT Blood Glucose.: 141 mg/dL (12-25-21 @ 14:34)  POCT Blood Glucose.: 152 mg/dL (12-25-21 @ 14:02)  POCT Blood Glucose.: 196 mg/dL (12-25-21 @ 13:04)  POCT Blood Glucose.: 224 mg/dL (12-25-21 @ 12:06)  POCT Blood Glucose.: 245 mg/dL (12-25-21 @ 11:02)                                                                                                           MEDICATIONS  (STANDING):  amitriptyline Oral Tab/Cap - Peds 75 milliGRAM(s) Oral at bedtime  ammonium lactate 12% Lotion 1 Application(s) Topical every 12 hours  aspirin  chewable 81 milliGRAM(s) Oral daily  chlorhexidine 4% Liquid 1 Application(s) Topical daily  dextrose 40% Gel 15 Gram(s) Oral once  dextrose 5% + sodium chloride 0.9%. 1000 milliLiter(s) (200 mL/Hr) IV Continuous <Continuous>  dextrose 5%. 1000 milliLiter(s) (100 mL/Hr) IV Continuous <Continuous>  dextrose 5%. 1000 milliLiter(s) (50 mL/Hr) IV Continuous <Continuous>  dextrose 50% Injectable 25 Gram(s) IV Push once  dextrose 50% Injectable 12.5 Gram(s) IV Push once  dextrose 50% Injectable 25 Gram(s) IV Push once  enoxaparin Injectable 40 milliGRAM(s) SubCutaneous daily  famotidine    Tablet 40 milliGRAM(s) Oral two times a day  glucagon  Injectable 1 milliGRAM(s) IntraMuscular once  insulin lispro (ADMELOG) corrective regimen sliding scale   SubCutaneous three times a day before meals  insulin lispro Injectable (ADMELOG) 15 Unit(s) SubCutaneous three times a day before meals  insulin regular Infusion 4 Unit(s)/Hr (4 mL/Hr) IV Continuous <Continuous>  metoprolol tartrate 25 milliGRAM(s) Oral two times a day  polyethylene glycol 3350 17 Gram(s) Oral daily  senna 1 Tablet(s) Oral daily    MEDICATIONS  (PRN):  acetaminophen     Tablet .. 650 milliGRAM(s) Oral every 6 hours PRN Moderate Pain (4 - 6)  bisacodyl Suppository 10 milliGRAM(s) Rectal daily PRN Constipation  cyclobenzaprine 10 milliGRAM(s) Oral three times a day PRN Muscle Spasm  ketorolac   Injectable 15 milliGRAM(s) IV Push three times a day PRN Severe Pain (7 - 10)      Xrays:                                                                                     ECHO

## 2021-12-26 NOTE — BEHAVIORAL HEALTH ASSESSMENT NOTE - NSBHCHARTREVIEWINVESTIGATE_PSY_A_CORE FT
< from: 12 Lead ECG (12.25.21 @ 07:17) >    Ventricular Rate 92 BPM    Atrial Rate 92 BPM    P-R Interval 129 ms    QRS Duration 83 ms    Q-T Interval 371 ms    QTC Calculation(Bazett) 459 ms    P Axis 30 degrees    R Axis 108 degrees    T Axis 26 degrees    Diagnosis Line Normal sinus rhythm  Possible Right ventricular hypertrophy  Abnormal ECG    Confirmed by Lazaro Bright ([id number]) on 12/25/2021 9:00:03 AM    < end of copied text >

## 2021-12-26 NOTE — PROGRESS NOTE ADULT - ASSESSMENT
IMPRESSION:       DKA resolved  Abdominal pain SP CTA   HO DM  Seizure?    PLAN:    CNS: VVEG. Neuro f/u. Psych f/u.    HEENT: Oral care    PULMONARY:  HOB @ 45 degrees    CARDIOVASCULAR:    GI: GI prophylaxis.  Feeding     RENAL:  Follow up lytes.  Correct as needed    INFECTIOUS DISEASE: Follow up cultures    HEMATOLOGICAL:  DVT prophylaxis.    ENDOCRINE:  Follow up FS.  Insulin protocol if needed. Insulin lantus and lispro. Endo f/u    MUSCULOSKELETAL:    Possible downgrade to step down.

## 2021-12-26 NOTE — PROGRESS NOTE ADULT - SUBJECTIVE AND OBJECTIVE BOX
Reason for Endocrinology Consult: Diabetes    HPI: 36y Female        PAST MEDICAL & SURGICAL HISTORY:  Neuropathy  right lower extremity, vaginal    Type 1 diabetes    Degenerative disc disease, thoracic    Urinary tract infection, recurrent    Gastroesophageal reflux disease, esophagitis presence not specified    Intractable headache, unspecified chronicity pattern, unspecified headache type    Major depressive disorder with single episode, in full remission  previously treated with zyprexa, celexa and lexapro    Tremor of right hand    Tachycardia    Spinal stenosis    No significant past surgical history      FAMILY HISTORY:      SH:  Smoking  Etoh:  Recreational Drugs:    Home Medications:  amitriptyline 75 mg oral tablet: 1 tab(s) orally once a day (at bedtime) (04 Nov 2021 15:06)  ammonium lactate 12% topical cream: Apply topically to affected area 2 times a day (23 Dec 2021 13:53)  cyclobenzaprine 10 mg oral tablet:  (04 Nov 2021 15:06)  famotidine 40 mg oral tablet: 1 tab(s) orally 2 times a day (23 Dec 2021 13:53)  metoprolol tartrate 25 mg oral tablet: 1 tab(s) orally 2 times a day (04 Nov 2021 15:06)  multivitamin: 1 tab(s) orally once a day (04 Nov 2021 15:06)  naproxen 500 mg oral delayed release tablet: 1 tab(s) orally 2 times a day (04 Nov 2021 15:06)      Current (Non-Endocrine) Meds:  acetaminophen     Tablet .. 650 milliGRAM(s) Oral every 6 hours PRN  amitriptyline Oral Tab/Cap - Peds 75 milliGRAM(s) Oral at bedtime  ammonium lactate 12% Lotion 1 Application(s) Topical every 12 hours  aspirin  chewable 81 milliGRAM(s) Oral daily  bisacodyl Suppository 10 milliGRAM(s) Rectal daily PRN  chlorhexidine 4% Liquid 1 Application(s) Topical daily  cyclobenzaprine 10 milliGRAM(s) Oral three times a day PRN  dextrose 5%. 1000 milliLiter(s) IV Continuous <Continuous>  dextrose 5%. 1000 milliLiter(s) IV Continuous <Continuous>  enoxaparin Injectable 40 milliGRAM(s) SubCutaneous daily  famotidine    Tablet 40 milliGRAM(s) Oral two times a day  ketorolac   Injectable 15 milliGRAM(s) IV Push three times a day PRN  metoprolol tartrate 25 milliGRAM(s) Oral two times a day  polyethylene glycol 3350 17 Gram(s) Oral daily  senna 1 Tablet(s) Oral daily      Current Endocrine Meds:   dextrose 40% Gel 15 Gram(s) Oral once  dextrose 50% Injectable 25 Gram(s) IV Push once  dextrose 50% Injectable 12.5 Gram(s) IV Push once  dextrose 50% Injectable 25 Gram(s) IV Push once  glucagon  Injectable 1 milliGRAM(s) IntraMuscular once  insulin lispro (ADMELOG) corrective regimen sliding scale   SubCutaneous three times a day before meals  insulin lispro Injectable (ADMELOG) 15 Unit(s) SubCutaneous three times a day before meals      Allergies:  amoxicillin (Unknown)  Ceclor (Rash)  ciprofloxacin (Unknown)  clindamycin (Unknown)  flu vaccines (Other)  gabapentin (Unknown)  Influenza Virus Vaccine, H5N1, Inactivated (Unknown)  penicillins (Unknown)      ROS:  Denies the following except as indicated.    General: weight loss/weight gain, decreased appetite, fatigue, fever  Eyes: blurry vision, double vision  ENT: neck swelling, dysphagia, voice changes   CV: palpitations, SOB, chest pain, cough  GI: nausea, vomiting, diarrhea, constipation, abdominal pain  : nocturia,  polyuria, dysuria  Endo: decreased libido, heat/cold intolerance, jitteriness  MSK: arthralgias, myalgias  Skin: rash, dryness, diaphoresis  Neuro: pedal numbness,pedal paresthesias, pedal pain    Height (cm): 162.6 (12-23 @ 08:06), 162.6 (12-22 @ 01:49)  Weight (kg): 78 (12-23 @ 08:06), 78 (12-22 @ 01:49)  BMI (kg/m2): 29.5 (12-23 @ 08:06), 29.5 (12-22 @ 01:49)    Vital Signs Last 24 Hrs  T(C): 37.3 (26 Dec 2021 04:00), Max: 37.3 (26 Dec 2021 04:00)  T(F): 99.1 (26 Dec 2021 04:00), Max: 99.1 (26 Dec 2021 04:00)  HR: 102 (26 Dec 2021 11:00) (96 - 128)  BP: 120/76 (26 Dec 2021 11:00) (79/53 - 151/64)  BP(mean): 88 (26 Dec 2021 11:00) (55 - 99)  RR: 13 (26 Dec 2021 11:00) (13 - 34)  SpO2: 99% (26 Dec 2021 11:00) (95% - 100%)  Constitutional: WN/WD in NAD.   Neck: no thyromegaly or palpable thyroid nodules   Respiratory: lungs CTAB.  Cardiovascular: regular rate and rhythm, normal S1 and S2, no audible murmurs  GI: soft, NT/ND, no masses/HSM appreciated.  Ext: no edema, no ulcers, pedal pulses palpable bilaterally  Neurology: no tremor, monofilament sensation intact in feet  Psychiatric: A&O x 3, normal affect/mood.        LABS:                        9.3    11.29 )-----------( 361      ( 26 Dec 2021 04:46 )             28.9     12-26    139  |  109  |  8<L>  ----------------------------<  158<H>  4.1   |  17  |  0.6<L>    Ca    7.9<L>      26 Dec 2021 04:46  Phos  2.8     12-26  Mg     1.9     12-26    TPro  5.7<L>  /  Alb  3.2<L>  /  TBili  0.2  /  DBili  x   /  AST  175<H>  /  ALT  195<H>  /  AlkPhos  93  12-26                                       RADIOLOGY & ADDITIONAL STUDIES:    A/P:36yFemale

## 2021-12-26 NOTE — PROGRESS NOTE ADULT - ASSESSMENT
36-year-old, F, patient, PMHx: Type I DM, on an insulin pump, complicated by Diabetic neuropathy and vulvodynia, HTN, chronic back pain, migraine headaches,  presents with lower quadrant abdominal pain, nausea and generalized weakness for 3 days, admitted for management of DKA.    IMPRESSION:  #DKA  # abd pain sp CT abdomen/ pelvis showing colitis  #Transaminitis  #Liver lesions ( likely fatty infiltration)  #Suspected Inappropriate Sinus Tachycardia  # Chaotic movements      PLAN:    CNS: Behavioral health consulted; recommended starting Xalepro 5 mg and then increase, with OP f/u for possible anxiety disorders    HEENT: Oral care, HOB at 45.     PULMONARY: HOB at 45. Keep Sao2: 92%-96%    CARDIOVASCULAR: Troponins are negative, on Lopressor for inappropriate sinus tachycardia    GI: Feeding: Feeds right now, patient is tolerating; for abdominal pain, the patient is taking cyclobenzaprine 100 mg q12h    RENAL: I=0, monitor electrolytes and replete as needed.  d/c'ed IV fluids    INFECTIOUS DISEASE: UA is negative, CXR negative, no signs of infection that may have exacerbated or triggered DKA.     HEMATOLOGICAL:  DVT prophylaxis.     ENDOCRINE:  Insulin drip d/c'ed, on Lantus 35 and 15 units of premeal lispros

## 2021-12-26 NOTE — BEHAVIORAL HEALTH ASSESSMENT NOTE - HPI (INCLUDE ILLNESS QUALITY, SEVERITY, DURATION, TIMING, CONTEXT, MODIFYING FACTORS, ASSOCIATED SIGNS AND SYMPTOMS)
36F, domiciled at [facility] with parents, unemployed, single, with PMHx of T1DM, Diabetic neuropathy , HTN, chronic back pain, migraine headaches, PPHx of anxiety/depression, 3 IPP admissions for suicide attempts as a teenager (all by overdose); admitted for DKA and colitis.  Psych consulted for involuntary movements/tremors.      Upon approach pt is sleeping calmly in bed, alert to voice and oriented x3.  No abnormal movements seen on exam.  Speech, behavior, thought process and content unremarkable. Per nursing team and medical resident patient had an episode of tremors last night that involved the whole body, during which the pt was alert and able to speak/follow commands; reportedly the tremors were diminished if someone touched the patient's arm and/or consoled her; there was no post-ictal state.      Patient states that yesterday she had an episode of tremors that similar to previous episodes however more sever due to involvement of the entire body.  pt states that in the past, tremors were isolated to her hands only but last night involved both UEs and LEs, as well as her trunk.  Patient endorses being alert during the event and being able to hear and see others around her, but had  a hard time speaking because her speech was "slurred" and "garbled."  Patient denies any events that occurred prior to the tremors, denies feeling anxious at the time, denies headaches or paresthesias before or after.     Patient endorses hx of ARMAAN and MDD since her teen years.  She endorses hx of taking zyprexa, celexa, lexapro in the past.  Also takes Amitriptyline for migraines.  PT states that lexapro was helpful for anxiety and depression in the past but this was stopped once she turned 18 and couldn't get a psychiatrist, per the pt. Patient endorses hx of 3 suicide attempts during teen years, by overdosing on various medications and one time with hydrogen peroxide.  Patient was admitted to IPP 3 times, each following these SAs.  Currently has no psychiatrist and has not taken meds for ARMAAN or MDD in years, however she recently had an intake appointment with [facility] on 12/7 and will start seeing a psychiatrist at the clinic in January.      Patient denies current SI, HI, AVH.  Denies depressive symptoms, but endorses anxiety attacks/panic attacks that occur 2-3 times per week that are associated with fear of having another attack.  Denies agoraphobia or social anxiety.  She does endorse a lot of anxiety related to her living condition at [facility], stating that there is no diabetic diet/food available, and that she is in general not well taken care of there.  Patient has been living there for the past 14 months ever since her parents lost their apartment and her father became sick.  Patient has never worked, has always lived with parents, and is supported by parent's half-way fund.  Patient does not get SSD or SSI, but had EBT and "cash assistance from a elizabeth" at one point.  Patient is generally anxious about her living condition, her own health, parents' health.

## 2021-12-26 NOTE — PROGRESS NOTE ADULT - SUBJECTIVE AND OBJECTIVE BOX
SUBJECTIVE:    Patient is a 36y old Female who presents with a chief complaint of Mild DKA (26 Dec 2021 11:10)    Overnight Events: No overnight events. Patient was still receiving the insulin infusion; anion gap closed, multiple fingerstick reads less than 200 and patient asymptomatic.  Lantus started at 3 am, and regular insulin infusion stopped at 6 am.    PAST MEDICAL & SURGICAL HISTORY  Neuropathy  right lower extremity, vaginal    Type 1 diabetes    Degenerative disc disease, thoracic    Urinary tract infection, recurrent    Gastroesophageal reflux disease, esophagitis presence not specified    Intractable headache, unspecified chronicity pattern, unspecified headache type    Major depressive disorder with single episode, in full remission  previously treated with zyprexa, celexa and lexapro    Tremor of right hand    Tachycardia    Spinal stenosis    No significant past surgical history      SOCIAL HISTORY:  Negative for smoking/alcohol/drug use.     ALLERGIES:  amoxicillin (Unknown)  Ceclor (Rash)  ciprofloxacin (Unknown)  clindamycin (Unknown)  flu vaccines (Other)  gabapentin (Unknown)  Influenza Virus Vaccine, H5N1, Inactivated (Unknown)  penicillins (Unknown)    MEDICATIONS:  STANDING MEDICATIONS  amitriptyline Oral Tab/Cap - Peds 75 milliGRAM(s) Oral at bedtime  ammonium lactate 12% Lotion 1 Application(s) Topical every 12 hours  aspirin  chewable 81 milliGRAM(s) Oral daily  chlorhexidine 4% Liquid 1 Application(s) Topical daily  dextrose 40% Gel 15 Gram(s) Oral once  dextrose 5%. 1000 milliLiter(s) IV Continuous <Continuous>  dextrose 5%. 1000 milliLiter(s) IV Continuous <Continuous>  dextrose 50% Injectable 25 Gram(s) IV Push once  dextrose 50% Injectable 12.5 Gram(s) IV Push once  dextrose 50% Injectable 25 Gram(s) IV Push once  enoxaparin Injectable 40 milliGRAM(s) SubCutaneous daily  escitalopram 5 milliGRAM(s) Oral daily  famotidine    Tablet 40 milliGRAM(s) Oral two times a day  glucagon  Injectable 1 milliGRAM(s) IntraMuscular once  insulin lispro (ADMELOG) corrective regimen sliding scale   SubCutaneous three times a day before meals  insulin lispro Injectable (ADMELOG) 15 Unit(s) SubCutaneous three times a day before meals  metoprolol tartrate 25 milliGRAM(s) Oral two times a day  polyethylene glycol 3350 17 Gram(s) Oral daily  senna 1 Tablet(s) Oral daily    PRN MEDICATIONS  acetaminophen     Tablet .. 650 milliGRAM(s) Oral every 6 hours PRN  bisacodyl Suppository 10 milliGRAM(s) Rectal daily PRN  cyclobenzaprine 10 milliGRAM(s) Oral three times a day PRN  ketorolac   Injectable 15 milliGRAM(s) IV Push three times a day PRN    VITALS:   T(F): 98.3, Max: 99.1 (12-26-21 @ 04:00)  HR: 114 (96 - 128)  BP: 129/75 (79/53 - 151/64)  RR: 29 (13 - 34)  SpO2: 99% (95% - 100%)    LABS:                        9.3    11.29 )-----------( 361      ( 26 Dec 2021 04:46 )             28.9     12-26    138  |  107  |  8<L>  ----------------------------<  213<H>  4.4   |  17  |  0.5<L>    Ca    8.0<L>      26 Dec 2021 11:41  Phos  2.8     12-26  Mg     1.9     12-26    TPro  5.7<L>  /  Alb  3.2<L>  /  TBili  0.2  /  DBili  x   /  AST  175<H>  /  ALT  195<H>  /  AlkPhos  93  12-26 12-25-21 @ 07:01  -  12-26-21 @ 07:00  --------------------------------------------------------  IN: 4395 mL / OUT: 400 mL / NET: 3995 mL          IMAGING/EKG:     Moderate colonic stool burden, without evidence of small bowel  obstruction; correlate for constipation    PHYSICAL EXAM:  GEN: NAD, comfortable  LUNGS: CTAB, no w/r/r  HEART: RRR, s1 and s2 appreciated, no m/r/g  ABD: soft, NT/ND, +BS  EXT: no edema, PP b/l  NEURO: AAOX3

## 2021-12-26 NOTE — BEHAVIORAL HEALTH ASSESSMENT NOTE - NSBHREFERDETAILS_PSY_A_CORE_FT
consulting for management of her anxiety+involuntary movements (see neuro note) suspecting from psychogenic etiology. Patient gets very anxious, feels like her throat will close up, and have involuntary shaking of entire body that can be controlled when physically holding her down.

## 2021-12-26 NOTE — BEHAVIORAL HEALTH ASSESSMENT NOTE - SUMMARY
36F, domiciled at [facility] with parents, unemployed, single, with PMHx of T1DM, Diabetic neuropathy , HTN, chronic back pain, migraine headaches, PPHx of anxiety/depression, 3 IPP admissions for suicide attempts as a teenager (all by overdose); admitted for DKA and colitis.  Psych consulted for involuntary movements/tremors.    Patient appears to be suffering form worsening anxiety in the context of acute medical problems and stressors about her living situation.  She is not suffering from an acute depressive episode, manic symptoms, or psychotic symptoms; she does not require IPP admission at this time.  She would benefit from starting Lexapro for anxiety as this was helpful in the past.  Currently her tremors and involuntary movements are pending full neuro workup with VEEG, and as such, is it unclear whether these symptoms are related to an organic cause vs. PNES.  PT has followup with [facility]    Recommendations:  - Start lexapro 5mg QD; can titrate up to 10mg QD after 4-5 days if pt is still in the hospital at that time.    - pt will followup with [facility] in January

## 2021-12-26 NOTE — BEHAVIORAL HEALTH ASSESSMENT NOTE - NSBHCHARTREVIEWLAB_PSY_A_CORE FT
9.3    11.29 )-----------( 361      ( 26 Dec 2021 04:46 )             28.9   12-26    139  |  109  |  8<L>  ----------------------------<  158<H>  4.1   |  17  |  0.6<L>    Ca    7.9<L>      26 Dec 2021 04:46  Phos  2.8     12-26  Mg     1.9     12-26    TPro  5.7<L>  /  Alb  3.2<L>  /  TBili  0.2  /  DBili  x   /  AST  175<H>  /  ALT  195<H>  /  AlkPhos  93  12-26

## 2021-12-26 NOTE — BEHAVIORAL HEALTH ASSESSMENT NOTE - OTHER
denies SI at this time or recently hx of anxiety and anxiety/panic attacks and pseudoseizures dissatisfaction with current living situation/residence

## 2021-12-26 NOTE — BEHAVIORAL HEALTH ASSESSMENT NOTE - ORIENTED TO PERSON
Records from [address] [place] [address]. Corder received and scanned into pt's MEDIA. Yes

## 2021-12-27 ENCOUNTER — NON-APPOINTMENT (OUTPATIENT)
Age: 36
End: 2021-12-27

## 2021-12-27 LAB
ALBUMIN SERPL ELPH-MCNC: 3.5 G/DL — SIGNIFICANT CHANGE UP (ref 3.5–5.2)
ALP SERPL-CCNC: 135 U/L — HIGH (ref 30–115)
ALT FLD-CCNC: 209 U/L — HIGH (ref 0–41)
ANION GAP SERPL CALC-SCNC: 16 MMOL/L — HIGH (ref 7–14)
AST SERPL-CCNC: 112 U/L — HIGH (ref 0–41)
BASOPHILS # BLD AUTO: 0.04 K/UL — SIGNIFICANT CHANGE UP (ref 0–0.2)
BASOPHILS NFR BLD AUTO: 0.5 % — SIGNIFICANT CHANGE UP (ref 0–1)
BILIRUB SERPL-MCNC: 0.3 MG/DL — SIGNIFICANT CHANGE UP (ref 0.2–1.2)
BUN SERPL-MCNC: 8 MG/DL — LOW (ref 10–20)
CALCIUM SERPL-MCNC: 8.8 MG/DL — SIGNIFICANT CHANGE UP (ref 8.5–10.1)
CHLORIDE SERPL-SCNC: 104 MMOL/L — SIGNIFICANT CHANGE UP (ref 98–110)
CO2 SERPL-SCNC: 19 MMOL/L — SIGNIFICANT CHANGE UP (ref 17–32)
CREAT SERPL-MCNC: 0.6 MG/DL — LOW (ref 0.7–1.5)
EOSINOPHIL # BLD AUTO: 0.27 K/UL — SIGNIFICANT CHANGE UP (ref 0–0.7)
EOSINOPHIL NFR BLD AUTO: 3.2 % — SIGNIFICANT CHANGE UP (ref 0–8)
GLUCOSE BLDC GLUCOMTR-MCNC: 115 MG/DL — HIGH (ref 70–99)
GLUCOSE BLDC GLUCOMTR-MCNC: 233 MG/DL — HIGH (ref 70–99)
GLUCOSE BLDC GLUCOMTR-MCNC: 297 MG/DL — HIGH (ref 70–99)
GLUCOSE BLDC GLUCOMTR-MCNC: 298 MG/DL — HIGH (ref 70–99)
GLUCOSE BLDC GLUCOMTR-MCNC: 355 MG/DL — HIGH (ref 70–99)
GLUCOSE BLDC GLUCOMTR-MCNC: 373 MG/DL — HIGH (ref 70–99)
GLUCOSE SERPL-MCNC: 270 MG/DL — HIGH (ref 70–99)
HCT VFR BLD CALC: 31.9 % — LOW (ref 37–47)
HGB BLD-MCNC: 10.1 G/DL — LOW (ref 12–16)
IMM GRANULOCYTES NFR BLD AUTO: 0.5 % — HIGH (ref 0.1–0.3)
LKM AB SER-ACNC: <20.1 UNITS — SIGNIFICANT CHANGE UP (ref 0–20)
LYMPHOCYTES # BLD AUTO: 1.72 K/UL — SIGNIFICANT CHANGE UP (ref 1.2–3.4)
LYMPHOCYTES # BLD AUTO: 20.4 % — LOW (ref 20.5–51.1)
MAGNESIUM SERPL-MCNC: 1.8 MG/DL — SIGNIFICANT CHANGE UP (ref 1.8–2.4)
MCHC RBC-ENTMCNC: 27.2 PG — SIGNIFICANT CHANGE UP (ref 27–31)
MCHC RBC-ENTMCNC: 31.7 G/DL — LOW (ref 32–37)
MCV RBC AUTO: 86 FL — SIGNIFICANT CHANGE UP (ref 81–99)
MONOCYTES # BLD AUTO: 0.72 K/UL — HIGH (ref 0.1–0.6)
MONOCYTES NFR BLD AUTO: 8.6 % — SIGNIFICANT CHANGE UP (ref 1.7–9.3)
NEUTROPHILS # BLD AUTO: 5.63 K/UL — SIGNIFICANT CHANGE UP (ref 1.4–6.5)
NEUTROPHILS NFR BLD AUTO: 66.8 % — SIGNIFICANT CHANGE UP (ref 42.2–75.2)
NRBC # BLD: 0 /100 WBCS — SIGNIFICANT CHANGE UP (ref 0–0)
PLATELET # BLD AUTO: 376 K/UL — SIGNIFICANT CHANGE UP (ref 130–400)
POTASSIUM SERPL-MCNC: 5 MMOL/L — SIGNIFICANT CHANGE UP (ref 3.5–5)
POTASSIUM SERPL-SCNC: 5 MMOL/L — SIGNIFICANT CHANGE UP (ref 3.5–5)
PROT SERPL-MCNC: 6.3 G/DL — SIGNIFICANT CHANGE UP (ref 6–8)
RBC # BLD: 3.71 M/UL — LOW (ref 4.2–5.4)
RBC # FLD: 14.7 % — HIGH (ref 11.5–14.5)
SODIUM SERPL-SCNC: 139 MMOL/L — SIGNIFICANT CHANGE UP (ref 135–146)
WBC # BLD: 8.42 K/UL — SIGNIFICANT CHANGE UP (ref 4.8–10.8)
WBC # FLD AUTO: 8.42 K/UL — SIGNIFICANT CHANGE UP (ref 4.8–10.8)

## 2021-12-27 PROCEDURE — 99232 SBSQ HOSP IP/OBS MODERATE 35: CPT

## 2021-12-27 RX ORDER — INSULIN GLARGINE 100 [IU]/ML
10 INJECTION, SOLUTION SUBCUTANEOUS AT BEDTIME
Refills: 0 | Status: DISCONTINUED | OUTPATIENT
Start: 2021-12-27 | End: 2022-01-03

## 2021-12-27 RX ORDER — INSULIN GLARGINE 100 [IU]/ML
35 INJECTION, SOLUTION SUBCUTANEOUS EVERY MORNING
Refills: 0 | Status: DISCONTINUED | OUTPATIENT
Start: 2021-12-27 | End: 2022-01-03

## 2021-12-27 RX ORDER — INSULIN HUMAN 100 [IU]/ML
10 INJECTION, SOLUTION SUBCUTANEOUS ONCE
Refills: 0 | Status: COMPLETED | OUTPATIENT
Start: 2021-12-27 | End: 2021-12-27

## 2021-12-27 RX ORDER — INSULIN LISPRO 100/ML
12 VIAL (ML) SUBCUTANEOUS
Refills: 0 | Status: DISCONTINUED | OUTPATIENT
Start: 2021-12-27 | End: 2021-12-30

## 2021-12-27 RX ORDER — SENNA PLUS 8.6 MG/1
2 TABLET ORAL AT BEDTIME
Refills: 0 | Status: DISCONTINUED | OUTPATIENT
Start: 2021-12-27 | End: 2022-01-06

## 2021-12-27 RX ORDER — INSULIN LISPRO 100/ML
VIAL (ML) SUBCUTANEOUS
Refills: 0 | Status: DISCONTINUED | OUTPATIENT
Start: 2021-12-27 | End: 2022-01-11

## 2021-12-27 RX ADMIN — INSULIN GLARGINE 10 UNIT(S): 100 INJECTION, SOLUTION SUBCUTANEOUS at 19:49

## 2021-12-27 RX ADMIN — FAMOTIDINE 40 MILLIGRAM(S): 10 INJECTION INTRAVENOUS at 17:29

## 2021-12-27 RX ADMIN — Medication 1 APPLICATION(S): at 05:04

## 2021-12-27 RX ADMIN — CYCLOBENZAPRINE HYDROCHLORIDE 10 MILLIGRAM(S): 10 TABLET, FILM COATED ORAL at 08:52

## 2021-12-27 RX ADMIN — SENNA PLUS 2 TABLET(S): 8.6 TABLET ORAL at 21:05

## 2021-12-27 RX ADMIN — INSULIN GLARGINE 35 UNIT(S): 100 INJECTION, SOLUTION SUBCUTANEOUS at 13:05

## 2021-12-27 RX ADMIN — Medication 15 UNIT(S): at 08:48

## 2021-12-27 RX ADMIN — FAMOTIDINE 40 MILLIGRAM(S): 10 INJECTION INTRAVENOUS at 05:03

## 2021-12-27 RX ADMIN — Medication 650 MILLIGRAM(S): at 15:28

## 2021-12-27 RX ADMIN — ENOXAPARIN SODIUM 40 MILLIGRAM(S): 100 INJECTION SUBCUTANEOUS at 13:08

## 2021-12-27 RX ADMIN — INSULIN HUMAN 10 UNIT(S): 100 INJECTION, SOLUTION SUBCUTANEOUS at 21:02

## 2021-12-27 RX ADMIN — ESCITALOPRAM OXALATE 5 MILLIGRAM(S): 10 TABLET, FILM COATED ORAL at 13:07

## 2021-12-27 RX ADMIN — CHLORHEXIDINE GLUCONATE 1 APPLICATION(S): 213 SOLUTION TOPICAL at 13:06

## 2021-12-27 RX ADMIN — Medication 10: at 08:49

## 2021-12-27 RX ADMIN — Medication 25 MILLIGRAM(S): at 05:04

## 2021-12-27 RX ADMIN — Medication 25 MILLIGRAM(S): at 17:28

## 2021-12-27 RX ADMIN — Medication 81 MILLIGRAM(S): at 13:11

## 2021-12-27 RX ADMIN — Medication 75 MILLIGRAM(S): at 21:04

## 2021-12-27 NOTE — PROGRESS NOTE ADULT - ASSESSMENT
type 1 diabetes, high fasting glucoses, add glargine 10 at bedtime, reduce lispro to 12 units, patient will follow up with dr. mcbride as an outpatient. please write for basaglar kwik pen as glargine, and admelog solostar pen as lispro, also needs a box of pen needles

## 2021-12-27 NOTE — PROGRESS NOTE ADULT - SUBJECTIVE AND OBJECTIVE BOX
Reason for Endocrinology Consult: Diabetes    HPI: 36y Female      PAST MEDICAL & SURGICAL HISTORY:  Neuropathy  right lower extremity, vaginal    Type 1 diabetes    Degenerative disc disease, thoracic    Urinary tract infection, recurrent    Gastroesophageal reflux disease, esophagitis presence not specified    Intractable headache, unspecified chronicity pattern, unspecified headache type    Major depressive disorder with single episode, in full remission  previously treated with zyprexa, celexa and lexapro    Tremor of right hand    Tachycardia    Spinal stenosis    No significant past surgical history      FAMILY HISTORY:      SH:  Smoking  Etoh:  Recreational Drugs:    Home Medications:  amitriptyline 75 mg oral tablet: 1 tab(s) orally once a day (at bedtime) (04 Nov 2021 15:06)  ammonium lactate 12% topical cream: Apply topically to affected area 2 times a day (23 Dec 2021 13:53)  cyclobenzaprine 10 mg oral tablet:  (04 Nov 2021 15:06)  famotidine 40 mg oral tablet: 1 tab(s) orally 2 times a day (23 Dec 2021 13:53)  metoprolol tartrate 25 mg oral tablet: 1 tab(s) orally 2 times a day (04 Nov 2021 15:06)  multivitamin: 1 tab(s) orally once a day (04 Nov 2021 15:06)  naproxen 500 mg oral delayed release tablet: 1 tab(s) orally 2 times a day (04 Nov 2021 15:06)      Current (Non-Endocrine) Meds:  acetaminophen     Tablet .. 650 milliGRAM(s) Oral every 6 hours PRN  amitriptyline Oral Tab/Cap - Peds 75 milliGRAM(s) Oral at bedtime  ammonium lactate 12% Lotion 1 Application(s) Topical every 12 hours  aspirin  chewable 81 milliGRAM(s) Oral daily  bisacodyl Suppository 10 milliGRAM(s) Rectal daily PRN  chlorhexidine 4% Liquid 1 Application(s) Topical daily  cyclobenzaprine 10 milliGRAM(s) Oral three times a day PRN  dextrose 5%. 1000 milliLiter(s) IV Continuous <Continuous>  dextrose 5%. 1000 milliLiter(s) IV Continuous <Continuous>  enoxaparin Injectable 40 milliGRAM(s) SubCutaneous daily  escitalopram 5 milliGRAM(s) Oral daily  famotidine    Tablet 40 milliGRAM(s) Oral two times a day  ketorolac   Injectable 15 milliGRAM(s) IV Push three times a day PRN  metoprolol tartrate 25 milliGRAM(s) Oral two times a day  polyethylene glycol 3350 17 Gram(s) Oral daily  senna 2 Tablet(s) Oral at bedtime      Current Endocrine Meds:   dextrose 40% Gel 15 Gram(s) Oral once  dextrose 50% Injectable 25 Gram(s) IV Push once  dextrose 50% Injectable 12.5 Gram(s) IV Push once  dextrose 50% Injectable 25 Gram(s) IV Push once  glucagon  Injectable 1 milliGRAM(s) IntraMuscular once  insulin glargine Injectable (LANTUS) 35 Unit(s) SubCutaneous every morning  insulin glargine Injectable (LANTUS) 10 Unit(s) SubCutaneous at bedtime  insulin lispro (ADMELOG) corrective regimen sliding scale   SubCutaneous three times a day before meals  insulin lispro Injectable (ADMELOG) 12 Unit(s) SubCutaneous three times a day before meals      Allergies:  amoxicillin (Unknown)  Ceclor (Rash)  ciprofloxacin (Unknown)  clindamycin (Unknown)  flu vaccines (Other)  gabapentin (Unknown)  Influenza Virus Vaccine, H5N1, Inactivated (Unknown)  penicillins (Unknown)      ROS:  Denies the following except as indicated.    General: weight loss/weight gain, decreased appetite, fatigue, fever  Eyes: blurry vision, double vision  ENT: neck swelling, dysphagia, voice changes   CV: palpitations, SOB, chest pain, cough  GI: nausea, vomiting, diarrhea, constipation, abdominal pain  : nocturia,  polyuria, dysuria  Endo: decreased libido, heat/cold intolerance, jitteriness  MSK: arthralgias, myalgias  Skin: rash, dryness, diaphoresis  Neuro: pedal numbness,pedal paresthesias, pedal pain    Height (cm): 162.6 (12-23 @ 08:06), 162.6 (12-22 @ 01:49)  Weight (kg): 78 (12-23 @ 08:06), 78 (12-22 @ 01:49)  BMI (kg/m2): 29.5 (12-23 @ 08:06), 29.5 (12-22 @ 01:49)    Vital Signs Last 24 Hrs  T(C): 37.2 (27 Dec 2021 07:26), Max: 37.2 (27 Dec 2021 07:26)  T(F): 98.9 (27 Dec 2021 07:26), Max: 98.9 (27 Dec 2021 07:26)  HR: 104 (27 Dec 2021 07:26) (99 - 114)  BP: 135/84 (27 Dec 2021 07:26) (112/73 - 148/85)  BP(mean): 103 (26 Dec 2021 21:00) (79 - 103)  RR: 18 (27 Dec 2021 07:26) (13 - 31)  SpO2: 98% (27 Dec 2021 00:00) (97% - 99%)  Constitutional: WN/WD in NAD.   Neck: no thyromegaly or palpable thyroid nodules   Respiratory: lungs CTAB.  Cardiovascular: regular rate and rhythm, normal S1 and S2, no audible murmurs  GI: soft, NT/ND, no masses/HSM appreciated.  Ext: no edema, no ulcers, pedal pulses palpable bilaterally  Neurology: no tremor, monofilament sensation intact in feet  Psychiatric: A&O x 3, normal affect/mood.        LABS:                        10.1   8.42  )-----------( 376      ( 27 Dec 2021 04:30 )             31.9     12-27    139  |  104  |  8<L>  ----------------------------<  270<H>  5.0   |  19  |  0.6<L>    Ca    8.8      27 Dec 2021 04:30  Phos  2.8     12-26  Mg     1.8     12-27    TPro  6.3  /  Alb  3.5  /  TBili  0.3  /  DBili  x   /  AST  112<H>  /  ALT  209<H>  /  AlkPhos  135<H>  12-27                                       RADIOLOGY & ADDITIONAL STUDIES:    A/P:36yFemale

## 2021-12-27 NOTE — PROGRESS NOTE ADULT - SUBJECTIVE AND OBJECTIVE BOX
CRISTI MONTGOMERY 36y Female  MRN#: [id number]   Hospital Day: 4d    SUBJECTIVE  Patient is a 36y old Female who presents with a chief complaint of Mild DKA (27 Dec 2021 10:15)  Currently admitted to medicine with the primary diagnosis of DKA (diabetic ketoacidosis)    INTERVAL HPI AND OVERNIGHT EVENTS:  Patient was examined and seen at bedside. This morning she is resting comfortably in bed and reports no issues or overnight events    OBJECTIVE  PAST MEDICAL & SURGICAL HISTORY  Neuropathy  right lower extremity, vaginal  Type 1 diabetes  Degenerative disc disease, thoracic  Urinary tract infection, recurrent  Gastroesophageal reflux disease, esophagitis presence not specified  Intractable headache, unspecified chronicity pattern, unspecified headache type  Major depressive disorder with single episode, in full remission  previously treated with zyprexa, celexa and lexapro  Tremor of right hand  Tachycardia  Spinal stenosis  No significant past surgical history      ALLERGIES:  amoxicillin (Unknown)  Ceclor (Rash)  ciprofloxacin (Unknown)  clindamycin (Unknown)  flu vaccines (Other)  gabapentin (Unknown)  Influenza Virus Vaccine, H5N1, Inactivated (Unknown)  penicillins (Unknown)    MEDICATIONS:  STANDING MEDICATIONS  amitriptyline Oral Tab/Cap - Peds 75 milliGRAM(s) Oral at bedtime  ammonium lactate 12% Lotion 1 Application(s) Topical every 12 hours  aspirin  chewable 81 milliGRAM(s) Oral daily  chlorhexidine 4% Liquid 1 Application(s) Topical daily  dextrose 40% Gel 15 Gram(s) Oral once  dextrose 5%. 1000 milliLiter(s) IV Continuous <Continuous>  dextrose 5%. 1000 milliLiter(s) IV Continuous <Continuous>  dextrose 50% Injectable 25 Gram(s) IV Push once  dextrose 50% Injectable 12.5 Gram(s) IV Push once  dextrose 50% Injectable 25 Gram(s) IV Push once  enoxaparin Injectable 40 milliGRAM(s) SubCutaneous daily  escitalopram 5 milliGRAM(s) Oral daily  famotidine    Tablet 40 milliGRAM(s) Oral two times a day  glucagon  Injectable 1 milliGRAM(s) IntraMuscular once  insulin glargine Injectable (LANTUS) 35 Unit(s) SubCutaneous every morning  insulin glargine Injectable (LANTUS) 10 Unit(s) SubCutaneous at bedtime  insulin lispro (ADMELOG) corrective regimen sliding scale   SubCutaneous three times a day before meals  insulin lispro Injectable (ADMELOG) 12 Unit(s) SubCutaneous three times a day before meals  metoprolol tartrate 25 milliGRAM(s) Oral two times a day  polyethylene glycol 3350 17 Gram(s) Oral daily  senna 2 Tablet(s) Oral at bedtime    PRN MEDICATIONS  acetaminophen     Tablet .. 650 milliGRAM(s) Oral every 6 hours PRN  bisacodyl Suppository 10 milliGRAM(s) Rectal daily PRN  cyclobenzaprine 10 milliGRAM(s) Oral three times a day PRN  ketorolac   Injectable 15 milliGRAM(s) IV Push three times a day PRN      VITAL SIGNS: Last 24 Hours  T(C): 37.2 (27 Dec 2021 07:26), Max: 37.2 (27 Dec 2021 07:26)  T(F): 98.9 (27 Dec 2021 07:26), Max: 98.9 (27 Dec 2021 07:26)  HR: 104 (27 Dec 2021 07:26) (99 - 114)  BP: 135/84 (27 Dec 2021 07:26) (112/73 - 148/85)  BP(mean): 103 (26 Dec 2021 21:00) (86 - 103)  RR: 18 (27 Dec 2021 07:26) (18 - 29)  SpO2: 98% (27 Dec 2021 00:00) (97% - 99%)    LABS:                        10.1   8.42  )-----------( 376      ( 27 Dec 2021 04:30 )             31.9     12-27    139  |  104  |  8<L>  ----------------------------<  270<H>  5.0   |  19  |  0.6<L>    Ca    8.8      27 Dec 2021 04:30  Phos  2.8     12-26  Mg     1.8     12-27    TPro  6.3  /  Alb  3.5  /  TBili  0.3  /  DBili  x   /  AST  112<H>  /  ALT  209<H>  /  AlkPhos  135<H>  12-27      PHYSICAL EXAM:  CONSTITUTIONAL: No acute distress, well-developed, well-groomed, AAOx3  HEAD: Atraumatic, normocephalic  PULMONARY: Clear to auscultation bilaterally  CARDIOVASCULAR: Regular rate and rhythm  GASTROINTESTINAL: Soft, non-tender, non-distended  MUSCULOSKELETAL: no edema  NEUROLOGY: non-focal  SKIN: No rashes or lesions    ASSESSMENT & PLAN    Diabetic Ketoacidosis- resolved  - patient treated in the ICU for DKA, now resolved  - followed by Endocrinology, previously on insulin pump  - Per Dr. Hendricks, d/c insulin pump  - c/w lantus 35 units at breakfast, lantus 10 units at bedtime, lispro 12 units qAC  - patient requesting consult with Dr. Galvez- pending     Anxiety Disorder   - patient with hx of anxiety- started on lexapro 5 mg per psychiatry  - continue to monitor     Sinus Tachycardia  - continue with metoprolol       Abdominal Pain   -CT Abdomen and Pelvis w/ IV Cont (12.02.21 @ 12:13)  No acute intra-abdominal or pelvic pathology is identified  - CLD Work up: completed, all labs collected   - follow up outpatient with GI For MRI of liver lesions   - complaining of abdominal pain 2/2 to constipation    Dispo: prepare for discharge on 12/28/21 to group home

## 2021-12-28 ENCOUNTER — TRANSCRIPTION ENCOUNTER (OUTPATIENT)
Age: 36
End: 2021-12-28

## 2021-12-28 LAB
ALBUMIN SERPL ELPH-MCNC: 3.7 G/DL — SIGNIFICANT CHANGE UP (ref 3.5–5.2)
ALBUMIN SERPL ELPH-MCNC: 3.9 G/DL — SIGNIFICANT CHANGE UP (ref 3.5–5.2)
ALBUMIN SERPL ELPH-MCNC: 4.3 G/DL — SIGNIFICANT CHANGE UP (ref 3.5–5.2)
ALP SERPL-CCNC: 128 U/L — HIGH (ref 30–115)
ALP SERPL-CCNC: 151 U/L — HIGH (ref 30–115)
ALP SERPL-CCNC: 156 U/L — HIGH (ref 30–115)
ALT FLD-CCNC: 169 U/L — HIGH (ref 0–41)
ALT FLD-CCNC: 196 U/L — HIGH (ref 0–41)
ALT FLD-CCNC: 198 U/L — HIGH (ref 0–41)
ANA TITR SER: NEGATIVE — SIGNIFICANT CHANGE UP
ANION GAP SERPL CALC-SCNC: 13 MMOL/L — SIGNIFICANT CHANGE UP (ref 7–14)
ANION GAP SERPL CALC-SCNC: 16 MMOL/L — HIGH (ref 7–14)
ANION GAP SERPL CALC-SCNC: 18 MMOL/L — HIGH (ref 7–14)
ANION GAP SERPL CALC-SCNC: 19 MMOL/L — HIGH (ref 7–14)
AST SERPL-CCNC: 121 U/L — HIGH (ref 0–41)
AST SERPL-CCNC: 55 U/L — HIGH (ref 0–41)
AST SERPL-CCNC: 75 U/L — HIGH (ref 0–41)
B-OH-BUTYR SERPL-SCNC: 0.2 MMOL/L — SIGNIFICANT CHANGE UP
BASOPHILS # BLD AUTO: 0.04 K/UL — SIGNIFICANT CHANGE UP (ref 0–0.2)
BASOPHILS NFR BLD AUTO: 0.6 % — SIGNIFICANT CHANGE UP (ref 0–1)
BILIRUB SERPL-MCNC: 0.2 MG/DL — SIGNIFICANT CHANGE UP (ref 0.2–1.2)
BILIRUB SERPL-MCNC: 0.3 MG/DL — SIGNIFICANT CHANGE UP (ref 0.2–1.2)
BILIRUB SERPL-MCNC: <0.2 MG/DL — SIGNIFICANT CHANGE UP (ref 0.2–1.2)
BUN SERPL-MCNC: 10 MG/DL — SIGNIFICANT CHANGE UP (ref 10–20)
BUN SERPL-MCNC: 13 MG/DL — SIGNIFICANT CHANGE UP (ref 10–20)
CALCIUM SERPL-MCNC: 8.9 MG/DL — SIGNIFICANT CHANGE UP (ref 8.5–10.1)
CALCIUM SERPL-MCNC: 9.2 MG/DL — SIGNIFICANT CHANGE UP (ref 8.5–10.1)
CALCIUM SERPL-MCNC: 9.6 MG/DL — SIGNIFICANT CHANGE UP (ref 8.5–10.1)
CALCIUM SERPL-MCNC: 9.6 MG/DL — SIGNIFICANT CHANGE UP (ref 8.5–10.1)
CALPROTECTIN STL-MCNT: 77 UG/G — SIGNIFICANT CHANGE UP (ref 0–120)
CHLORIDE SERPL-SCNC: 100 MMOL/L — SIGNIFICANT CHANGE UP (ref 98–110)
CHLORIDE SERPL-SCNC: 97 MMOL/L — LOW (ref 98–110)
CHLORIDE SERPL-SCNC: 98 MMOL/L — SIGNIFICANT CHANGE UP (ref 98–110)
CHLORIDE SERPL-SCNC: 99 MMOL/L — SIGNIFICANT CHANGE UP (ref 98–110)
CO2 SERPL-SCNC: 19 MMOL/L — SIGNIFICANT CHANGE UP (ref 17–32)
CO2 SERPL-SCNC: 20 MMOL/L — SIGNIFICANT CHANGE UP (ref 17–32)
CO2 SERPL-SCNC: 22 MMOL/L — SIGNIFICANT CHANGE UP (ref 17–32)
CO2 SERPL-SCNC: 22 MMOL/L — SIGNIFICANT CHANGE UP (ref 17–32)
CREAT SERPL-MCNC: 0.6 MG/DL — LOW (ref 0.7–1.5)
CREAT SERPL-MCNC: 0.6 MG/DL — LOW (ref 0.7–1.5)
CREAT SERPL-MCNC: 0.7 MG/DL — SIGNIFICANT CHANGE UP (ref 0.7–1.5)
CREAT SERPL-MCNC: 0.7 MG/DL — SIGNIFICANT CHANGE UP (ref 0.7–1.5)
EOSINOPHIL # BLD AUTO: 0.17 K/UL — SIGNIFICANT CHANGE UP (ref 0–0.7)
EOSINOPHIL NFR BLD AUTO: 2.4 % — SIGNIFICANT CHANGE UP (ref 0–8)
GLUCOSE BLDC GLUCOMTR-MCNC: 170 MG/DL — HIGH (ref 70–99)
GLUCOSE BLDC GLUCOMTR-MCNC: 197 MG/DL — HIGH (ref 70–99)
GLUCOSE BLDC GLUCOMTR-MCNC: 234 MG/DL — HIGH (ref 70–99)
GLUCOSE BLDC GLUCOMTR-MCNC: 270 MG/DL — HIGH (ref 70–99)
GLUCOSE BLDC GLUCOMTR-MCNC: 295 MG/DL — HIGH (ref 70–99)
GLUCOSE BLDC GLUCOMTR-MCNC: 69 MG/DL — LOW (ref 70–99)
GLUCOSE SERPL-MCNC: 205 MG/DL — HIGH (ref 70–99)
GLUCOSE SERPL-MCNC: 222 MG/DL — HIGH (ref 70–99)
GLUCOSE SERPL-MCNC: 305 MG/DL — HIGH (ref 70–99)
GLUCOSE SERPL-MCNC: 91 MG/DL — SIGNIFICANT CHANGE UP (ref 70–99)
HCT VFR BLD CALC: 32.3 % — LOW (ref 37–47)
HGB BLD-MCNC: 10.1 G/DL — LOW (ref 12–16)
IMM GRANULOCYTES NFR BLD AUTO: 0.4 % — HIGH (ref 0.1–0.3)
LYMPHOCYTES # BLD AUTO: 2.31 K/UL — SIGNIFICANT CHANGE UP (ref 1.2–3.4)
LYMPHOCYTES # BLD AUTO: 33 % — SIGNIFICANT CHANGE UP (ref 20.5–51.1)
MCHC RBC-ENTMCNC: 26.8 PG — LOW (ref 27–31)
MCHC RBC-ENTMCNC: 31.3 G/DL — LOW (ref 32–37)
MCV RBC AUTO: 85.7 FL — SIGNIFICANT CHANGE UP (ref 81–99)
MONOCYTES # BLD AUTO: 0.8 K/UL — HIGH (ref 0.1–0.6)
MONOCYTES NFR BLD AUTO: 11.4 % — HIGH (ref 1.7–9.3)
NEUTROPHILS # BLD AUTO: 3.64 K/UL — SIGNIFICANT CHANGE UP (ref 1.4–6.5)
NEUTROPHILS NFR BLD AUTO: 52.2 % — SIGNIFICANT CHANGE UP (ref 42.2–75.2)
NRBC # BLD: 0 /100 WBCS — SIGNIFICANT CHANGE UP (ref 0–0)
PLATELET # BLD AUTO: 365 K/UL — SIGNIFICANT CHANGE UP (ref 130–400)
POTASSIUM SERPL-MCNC: 4.2 MMOL/L — SIGNIFICANT CHANGE UP (ref 3.5–5)
POTASSIUM SERPL-MCNC: 4.2 MMOL/L — SIGNIFICANT CHANGE UP (ref 3.5–5)
POTASSIUM SERPL-MCNC: 4.3 MMOL/L — SIGNIFICANT CHANGE UP (ref 3.5–5)
POTASSIUM SERPL-MCNC: 4.5 MMOL/L — SIGNIFICANT CHANGE UP (ref 3.5–5)
POTASSIUM SERPL-SCNC: 4.2 MMOL/L — SIGNIFICANT CHANGE UP (ref 3.5–5)
POTASSIUM SERPL-SCNC: 4.2 MMOL/L — SIGNIFICANT CHANGE UP (ref 3.5–5)
POTASSIUM SERPL-SCNC: 4.3 MMOL/L — SIGNIFICANT CHANGE UP (ref 3.5–5)
POTASSIUM SERPL-SCNC: 4.5 MMOL/L — SIGNIFICANT CHANGE UP (ref 3.5–5)
PROT SERPL-MCNC: 6.7 G/DL — SIGNIFICANT CHANGE UP (ref 6–8)
PROT SERPL-MCNC: 6.7 G/DL — SIGNIFICANT CHANGE UP (ref 6–8)
PROT SERPL-MCNC: 7.6 G/DL — SIGNIFICANT CHANGE UP (ref 6–8)
RBC # BLD: 3.77 M/UL — LOW (ref 4.2–5.4)
RBC # FLD: 14.5 % — SIGNIFICANT CHANGE UP (ref 11.5–14.5)
SODIUM SERPL-SCNC: 134 MMOL/L — LOW (ref 135–146)
SODIUM SERPL-SCNC: 135 MMOL/L — SIGNIFICANT CHANGE UP (ref 135–146)
SODIUM SERPL-SCNC: 137 MMOL/L — SIGNIFICANT CHANGE UP (ref 135–146)
SODIUM SERPL-SCNC: 137 MMOL/L — SIGNIFICANT CHANGE UP (ref 135–146)
WBC # BLD: 6.99 K/UL — SIGNIFICANT CHANGE UP (ref 4.8–10.8)
WBC # FLD AUTO: 6.99 K/UL — SIGNIFICANT CHANGE UP (ref 4.8–10.8)

## 2021-12-28 RX ORDER — INSULIN LISPRO 100/ML
8 VIAL (ML) SUBCUTANEOUS ONCE
Refills: 0 | Status: COMPLETED | OUTPATIENT
Start: 2021-12-28 | End: 2021-12-28

## 2021-12-28 RX ORDER — SODIUM CHLORIDE 9 MG/ML
1000 INJECTION INTRAMUSCULAR; INTRAVENOUS; SUBCUTANEOUS ONCE
Refills: 0 | Status: COMPLETED | OUTPATIENT
Start: 2021-12-28 | End: 2021-12-28

## 2021-12-28 RX ORDER — INSULIN GLARGINE 100 [IU]/ML
10 INJECTION, SOLUTION SUBCUTANEOUS
Qty: 20 | Refills: 2
Start: 2021-12-28 | End: 2022-03-27

## 2021-12-28 RX ORDER — ISOPROPYL ALCOHOL, BENZOCAINE .7; .06 ML/ML; ML/ML
1 SWAB TOPICAL
Qty: 100 | Refills: 1
Start: 2021-12-28 | End: 2022-02-15

## 2021-12-28 RX ORDER — FAMOTIDINE 10 MG/ML
1 INJECTION INTRAVENOUS
Qty: 0 | Refills: 0 | DISCHARGE

## 2021-12-28 RX ORDER — INSULIN GLARGINE 100 [IU]/ML
35 INJECTION, SOLUTION SUBCUTANEOUS
Qty: 15 | Refills: 2
Start: 2021-12-28 | End: 2022-03-27

## 2021-12-28 RX ORDER — INSULIN LISPRO 100/ML
12 VIAL (ML) SUBCUTANEOUS
Qty: 20 | Refills: 0
Start: 2021-12-28 | End: 2022-01-26

## 2021-12-28 RX ORDER — CYCLOBENZAPRINE HYDROCHLORIDE 10 MG/1
0 TABLET, FILM COATED ORAL
Qty: 0 | Refills: 0 | DISCHARGE

## 2021-12-28 RX ORDER — SENNA PLUS 8.6 MG/1
2 TABLET ORAL
Qty: 30 | Refills: 0
Start: 2021-12-28 | End: 2022-01-11

## 2021-12-28 RX ORDER — SODIUM CHLORIDE 9 MG/ML
1000 INJECTION INTRAMUSCULAR; INTRAVENOUS; SUBCUTANEOUS
Refills: 0 | Status: DISCONTINUED | OUTPATIENT
Start: 2021-12-28 | End: 2022-01-03

## 2021-12-28 RX ORDER — HYDROMORPHONE HYDROCHLORIDE 2 MG/ML
8 INJECTION INTRAMUSCULAR; INTRAVENOUS; SUBCUTANEOUS EVERY 8 HOURS
Refills: 0 | Status: DISCONTINUED | OUTPATIENT
Start: 2021-12-28 | End: 2021-12-28

## 2021-12-28 RX ORDER — INSULIN LISPRO 100/ML
1 VIAL (ML) SUBCUTANEOUS ONCE
Refills: 0 | Status: COMPLETED | OUTPATIENT
Start: 2021-12-28 | End: 2021-12-28

## 2021-12-28 RX ORDER — POLYETHYLENE GLYCOL 3350 17 G/17G
17 POWDER, FOR SOLUTION ORAL
Qty: 510 | Refills: 0
Start: 2021-12-28 | End: 2022-01-26

## 2021-12-28 RX ORDER — ASPIRIN/CALCIUM CARB/MAGNESIUM 324 MG
1 TABLET ORAL
Qty: 0 | Refills: 0 | DISCHARGE
Start: 2021-12-28

## 2021-12-28 RX ORDER — ESCITALOPRAM OXALATE 10 MG/1
1 TABLET, FILM COATED ORAL
Qty: 30 | Refills: 1
Start: 2021-12-28 | End: 2022-02-25

## 2021-12-28 RX ADMIN — ESCITALOPRAM OXALATE 5 MILLIGRAM(S): 10 TABLET, FILM COATED ORAL at 11:27

## 2021-12-28 RX ADMIN — Medication 12 UNIT(S): at 08:14

## 2021-12-28 RX ADMIN — Medication 12 UNIT(S): at 11:45

## 2021-12-28 RX ADMIN — Medication 3: at 08:15

## 2021-12-28 RX ADMIN — Medication 15 MILLIGRAM(S): at 20:18

## 2021-12-28 RX ADMIN — FAMOTIDINE 40 MILLIGRAM(S): 10 INJECTION INTRAVENOUS at 05:52

## 2021-12-28 RX ADMIN — CYCLOBENZAPRINE HYDROCHLORIDE 10 MILLIGRAM(S): 10 TABLET, FILM COATED ORAL at 21:41

## 2021-12-28 RX ADMIN — FAMOTIDINE 40 MILLIGRAM(S): 10 INJECTION INTRAVENOUS at 17:50

## 2021-12-28 RX ADMIN — Medication 25 MILLIGRAM(S): at 05:52

## 2021-12-28 RX ADMIN — Medication 1 APPLICATION(S): at 05:51

## 2021-12-28 RX ADMIN — Medication 81 MILLIGRAM(S): at 11:28

## 2021-12-28 RX ADMIN — Medication 8 UNIT(S): at 13:40

## 2021-12-28 RX ADMIN — Medication 25 MILLIGRAM(S): at 17:49

## 2021-12-28 RX ADMIN — SODIUM CHLORIDE 1000 MILLILITER(S): 9 INJECTION INTRAMUSCULAR; INTRAVENOUS; SUBCUTANEOUS at 13:40

## 2021-12-28 RX ADMIN — Medication 2: at 11:36

## 2021-12-28 RX ADMIN — INSULIN GLARGINE 35 UNIT(S): 100 INJECTION, SOLUTION SUBCUTANEOUS at 11:29

## 2021-12-28 RX ADMIN — Medication 75 MILLIGRAM(S): at 21:32

## 2021-12-28 RX ADMIN — INSULIN GLARGINE 10 UNIT(S): 100 INJECTION, SOLUTION SUBCUTANEOUS at 21:32

## 2021-12-28 RX ADMIN — Medication 1 UNIT(S): at 14:45

## 2021-12-28 RX ADMIN — ENOXAPARIN SODIUM 40 MILLIGRAM(S): 100 INJECTION SUBCUTANEOUS at 11:28

## 2021-12-28 RX ADMIN — Medication 1 APPLICATION(S): at 17:50

## 2021-12-28 NOTE — DISCHARGE NOTE PROVIDER - NSDCFUSCHEDAPPT_GEN_ALL_CORE_FT
CRISTI MONTGOMERY ; 01/05/2022 ; NPP Internal Med [address] ; 01/07/2022 ; NPP Gastro Doc Off [address] ; 01/24/2022 ; NPP PSYCHIATRY  CRISTI Pabon ; 02/01/2022 ; [facility] ; 02/17/2022 ; NPP Neurology [facility] CRISTI Jacobs ; 03/14/2022 ; NPP Podiatry [address] CRISTI MONTGOMERY ; 01/19/2022 ; NPP Internal Med [address] ; 01/24/2022 ; NPP PSYCHIATRY  [place]  CRISTI MONTGOMERY ; 02/01/2022 ; NPP Endocrin [address] ; 02/17/2022 ; NPP Neurology [facility] CRISTI Jacobs ; 03/14/2022 ; NPP Podiatry [address] CRISTI MONTGOMERY ; 01/14/2022 ; NPP Gastro Doc Off [address] ; 01/19/2022 ; NPP Internal Med [address] ; 01/24/2022 ; NPP PSYCHIATRY  CRISTI Pabon ; 02/01/2022 ; [facility] ; 02/17/2022 ; NPP Neurology [facility] CRISTI Jacobs ; 03/14/2022 ; NPP Podiatry [address]

## 2021-12-28 NOTE — DISCHARGE NOTE PROVIDER - NSDCMRMEDTOKEN_GEN_ALL_CORE_FT
alcohol swabs : Apply topically to affected area 4 times a day   amitriptyline 75 mg oral tablet: 1 tab(s) orally once a day (at bedtime)  ammonium lactate 12% topical cream: Apply topically to affected area 2 times a day  Basaglar KwikPen 100 units/mL subcutaneous solution: 35 unit(s) subcutaneous once a day   Basaglar KwikPen 100 units/mL subcutaneous solution: 10 unit(s) subcutaneous once a day (at bedtime)   ClearLax oral powder for reconstitution: 17 gram(s) orally once a day  cyclobenzaprine 10 mg oral tablet: 1 tab(s) orally once a day (at bedtime)  glucometer (per patient&#x27;s insurance): Test blood sugars four times a day. Dispense #1 glucometer.  glucose tablets: Follow instructions on bottle when sugar is low.  HumaLOG KwikPen 100 units/mL injectable solution: 12 unit(s) subcutaneous 3 times a day (with meals)   Innerclean oral tablet: 2 tab(s) orally once a day (at bedtime)  Insulin Pen Needles, 4mm: 1 application subcutaneously 4 times a day. ** Use with insulin pen **   lancets: 1 application subcutaneously 4 times a day   Lexapro 5 mg oral tablet: 1 tab(s) orally once a day  metoprolol tartrate 25 mg oral tablet: 1 tab(s) orally 2 times a day  multivitamin: 1 tab(s) orally once a day  test strips (per patient&#x27;s insurance): 1 application subcutaneously 4 times a day. ** Compatible with patient&#x27;s glucometer **   alcohol swabs : Apply topically to affected area 4 times a day   amitriptyline 75 mg oral tablet: 1 tab(s) orally once a day (at bedtime)  ammonium lactate 12% topical cream: Apply topically to affected area 2 times a day  aspirin 81 mg oral tablet, chewable: 1 tab(s) orally once a day  Basaglar KwikPen 100 units/mL subcutaneous solution: 35 unit(s) subcutaneous once a day   Basaglar KwikPen 100 units/mL subcutaneous solution: 10 unit(s) subcutaneous once a day (at bedtime)   ClearLax oral powder for reconstitution: 17 gram(s) orally once a day  cyclobenzaprine 10 mg oral tablet: 1 tab(s) orally once a day (at bedtime)  glucometer (per patient&#x27;s insurance): Test blood sugars four times a day. Dispense #1 glucometer.  glucose tablets: Follow instructions on bottle when sugar is low.  guaifenesin-dextromethorphan 100 mg-10 mg/5 mL oral liquid: 10 milliliter(s) orally every 4 hours, As needed, Cough  HumaLOG KwikPen 100 units/mL injectable solution: 1 Unit(s) if Glucose 151 - 200  2 Unit(s) if Glucose 201 - 250  3 Unit(s) if Glucose 251 - 300  4 Unit(s) if Glucose 301 - 350  5 Unit(s) if Glucose 351 - 400  6 Unit(s) if Glucose Greater Than 400  HumaLOG KwikPen 100 units/mL injectable solution: 12 unit(s) subcutaneous 3 times a day (with meals)   Innerclean oral tablet: 2 tab(s) orally once a day (at bedtime)  Insulin Pen Needles, 4mm: 1 application subcutaneously 4 times a day. ** Use with insulin pen **   lancets: 1 application subcutaneously 4 times a day   Lexapro 5 mg oral tablet: 1 tab(s) orally once a day  metoprolol tartrate 25 mg oral tablet: 1 tab(s) orally 2 times a day  multivitamin: 1 tab(s) orally once a day  test strips (per patient&#x27;s insurance): 1 application subcutaneously 4 times a day. ** Compatible with patient&#x27;s glucometer **   alcohol swabs : Apply topically to affected area 4 times a day   amitriptyline 75 mg oral tablet: 1 tab(s) orally once a day (at bedtime)  ammonium lactate 12% topical cream: Apply topically to affected area 2 times a day  aspirin 81 mg oral tablet, chewable: 1 tab(s) orally once a day  ClearLax oral powder for reconstitution: 17 gram(s) orally once a day  cyclobenzaprine 10 mg oral tablet: 1 tab(s) orally once a day (at bedtime)  famotidine 40 mg oral tablet: 1 tab(s) orally 2 times a day  glucometer (per patient&#x27;s insurance): Test blood sugars four times a day. Dispense #1 glucometer.  glucose tablets: Follow instructions on bottle when sugar is low.  guaifenesin-dextromethorphan 100 mg-10 mg/5 mL oral liquid: 10 milliliter(s) orally every 4 hours, As needed, Cough  Innerclean oral tablet: 2 tab(s) orally once a day (at bedtime)  Insulin Pen Needles, 4mm: 1 application subcutaneously 4 times a day. ** Use with insulin pen **   lancets: 1 application subcutaneously 4 times a day   Lexapro 5 mg oral tablet: 1 tab(s) orally once a day  metoprolol tartrate 25 mg oral tablet: 1 tab(s) orally 2 times a day  multivitamin: 1 tab(s) orally once a day  naproxen 500 mg oral delayed release tablet: 1 tab(s) orally 2 times a day, As Needed  -for bladder spasms   test strips (per patient&#x27;s insurance): 1 application subcutaneously 4 times a day. ** Compatible with patient&#x27;s glucometer **   Admelog 100 units/mL injectable solution: 80 unit(s) injectable once a day for pump  alcohol swabs : Apply topically to affected area 4 times a day   amitriptyline 75 mg oral tablet: 1 tab(s) orally once a day (at bedtime)  ammonium lactate 12% topical cream: Apply topically to affected area 2 times a day  aspirin 81 mg oral tablet, chewable: 1 tab(s) orally once a day  ClearLax oral powder for reconstitution: 17 gram(s) orally once a day  cyclobenzaprine 10 mg oral tablet: 1 tab(s) orally once a day (at bedtime)  famotidine 40 mg oral tablet: 1 tab(s) orally 2 times a day  glucometer (per patient&#x27;s insurance): Test blood sugars four times a day. Dispense #1 glucometer.  glucose tablets: Follow instructions on bottle when sugar is low.  guaifenesin-dextromethorphan 100 mg-10 mg/5 mL oral liquid: 10 milliliter(s) orally every 4 hours, As needed, Cough  Innerclean oral tablet: 2 tab(s) orally once a day (at bedtime)  Insulin Pen Needles, 4mm: 1 application subcutaneously 4 times a day. ** Use with insulin pen **   lancets: 1 application subcutaneously 4 times a day   Lexapro 5 mg oral tablet: 1 tab(s) orally once a day  metoprolol tartrate 25 mg oral tablet: 1 tab(s) orally 2 times a day  multivitamin: 1 tab(s) orally once a day  naproxen 500 mg oral delayed release tablet: 1 tab(s) orally 2 times a day, As Needed  -for bladder spasms   test strips (per patient&#x27;s insurance): 1 application subcutaneously 4 times a day. ** Compatible with patient&#x27;s glucometer **

## 2021-12-28 NOTE — DISCHARGE NOTE PROVIDER - PROVIDER TOKENS
PROVIDER:[TOKEN:[[id number]],FOLLOWUP:[1 week]] PROVIDER:[TOKEN:[[id number]],FOLLOWUP:[1 week]],PROVIDER:[TOKEN:[[id number]],FOLLOWUP:[1 week]],PROVIDER:[TOKEN:[[id number]],FOLLOWUP:[1 week]]

## 2021-12-28 NOTE — DISCHARGE NOTE PROVIDER - CARE PROVIDER_API CALL
Elizabeth Galvez)  Medicine  [address] [place] [address]  Phone: [phone]  Fax: [phone]  Follow Up Time: 1 week   Elizabeth Galvez)  Medicine  [address] [place] [id number]  Phone: [phone]  Fax: [phone]  Follow Up Time: 1 week    Leonardo Mejia)  Gastroenterology; Internal Medicine  [address] [place] [address]  Phone: [phone]  Fax: [phone]  Follow Up Time: 1 week    Martha Robertson)  Internal Medicine  [address] [place] [address]  Phone: [phone]  Fax: [phone]  Follow Up Time: 1 week

## 2021-12-28 NOTE — CHART NOTE - NSCHARTNOTEFT_GEN_A_CORE
Patient noted to have steadily increasing anion gap over the last 24 hours    discussed case with resident-draw stat BMP, hydrate with IVF, and administer SQ insulin    Observe until AM    Discharge hold until 12/29

## 2021-12-28 NOTE — DISCHARGE NOTE PROVIDER - CARE PROVIDERS DIRECT ADDRESSES
,DirectAddress_Unknown ,DirectAddress_Unknown,[email or web address],DirectAddress_Unknown

## 2021-12-28 NOTE — DISCHARGE NOTE PROVIDER - HOSPITAL COURSE
36-year-old, F, patient, PMHx: Type I DM ( since the age of 16 months) on an insulin pump complicated by Diabetic neuropathy , HTN, chronic back pain, migraine headaches,  presents with lower quadrant abdominal pain, nausea and generalized weakness for 3 days. The patient was seen yesterday for the same compliant and d/c after unremarkable workup from the ED. Today the patient represents to the hospital still complaining of lower quadrant abdominal pain. No Nausea, no vomiting. She denies any diarrhea, but reports that a few weeks ago she was having episodes of bloody diarrhea, and was given a diagnosis of colitis ( no further work up was done). The patient reports urinary frequency. No urgency or dysuria.   she was treated in the icu for dka   she is stable for dc 36-year-old, F, patient, PMHx: Type I DM ( since the age of 16 months) on an insulin pump complicated by Diabetic neuropathy , HTN, chronic back pain, migraine headaches,  presents with lower quadrant abdominal pain, nausea and generalized weakness for 3 days. The patient was seen yesterday for the same compliant and d/c after unremarkable workup from the ED. Today the patient represents to the hospital still complaining of lower quadrant abdominal pain. No Nausea, no vomiting. She denies any diarrhea, but reports that a few weeks ago she was having episodes of bloody diarrhea, and was given a diagnosis of colitis ( no further work up was done). The patient reports urinary frequency. No urgency or dysuria.     Patient was admitted to ICU for management of DKA which resolved and patient was   followed by Endocrinology, previously on insulin pump however admitted for DKA likely because of pump malfunctioning, patient unable to take insulin by pen because of policy where she lives (can not take injection and there is not nurse on site 24 hours. Patient had a delayed discharging waiting for new pump to be delivered which was successfully delivered today 1/11/2022; During patients hospital stay she was on lantus 40 units AM, 13 units bedtime, lispro 10 units before meals. Patient also found to be COVID + on admission; on room air and endorsed abdominal pain which CT abd/pelvis: negative for acute pathology; KUB + for moderate stool burden in which patient currently having adequate bowel movements. Patient is stable for discharged after be advised how to use insulin pump and to follow up with endocrinology and gastroenterology outpatient for continue workup of intermittent abd pain.       # COVID-19 PNA  - PCR positive 12/31 and 1/4  - 1 episode of high grade fever  - was started on dexamethasone but ID said not necessary  - s/p remdesivir 200mg x1 - now stopped  - on RA and pulse ox is acceptable  - possibly worsening abdominal pain and diarrhea due to COVID and laxatives  - continue isolation per protocol  - c/w Robitussin 10mL Q4 PRN Cough  - c/w Benzonatate 100mg Oral three times a day PRN Cough      # Abdominal Pain, resolved  # Uptrending LFTs, resolved  -CT Abdomen and Pelvis w/ IV Cont 12/2/21:  No acute intra-abdominal or pelvic pathology is identified  - CLD Work up: completed, all labs collected   - LFTs uptrending, stopped RDV, steroids as per ID  - blood cultures 12/31 NGTD  - RUQ US no significant findings  - stopped miralax ,senna due to complaints of multiple episodes of diarrhea  - pepto-bismol for cramping, diarrhea  - monitor LFTs  - follow up outpatient with GI For MRI of liver lesions and colonoscopy to r/o IBD    # Diabetic Ketoacidosis, resolved  - patient treated in the ICU for DKA, now resolved  - episode of hypoglycemia, tx w/ dextrose, lispro decreased  - insulin lantus 40 units AM, 13 units bedtime, lispro 10 units before meals, SS  - followed by Endocrinology, previously on insulin pump however admitted for DKA likely because of pump malfunctioning, patient unable to take insulin by pen because of policy where she lives (can not take injection and there is not nurse on site 24 hours  - Pending delivery of new pump sent to [employer] and will be delivered to the hospital and to have virtual training to be trained on new pump    # Anxiety Disorder   - patient with hx of anxiety- started on lexapro 5 mg per psychiatry   - requests xanax occasionally    # Sinus Tachycardia, resolved  - c/w metoprolol     # Abdominal Pain   # Uptrending LFTs  -CT Abdomen and Pelvis w/ IV Cont 12/2/21:  No acute intra-abdominal or pelvic pathology is identified  - CLD Work up: completed, all labs collected   - RUQ US: no significant findings  - follow up outpatient with GI For MRI of liver lesions and colonoscopy to r/o ibd 36-year-old, F, patient, PMHx: Type I DM ( since the age of 16 months) on an insulin pump complicated by Diabetic neuropathy , HTN, chronic back pain, migraine headaches,  presents with lower quadrant abdominal pain, nausea and generalized weakness for 3 days. The patient was seen yesterday for the same compliant and d/c after unremarkable workup from the ED. Today the patient represents to the hospital still complaining of lower quadrant abdominal pain. No Nausea, no vomiting. She denies any diarrhea, but reports that a few weeks ago she was having episodes of bloody diarrhea, and was given a diagnosis of colitis ( no further work up was done). The patient reports urinary frequency. No urgency or dysuria.     Patient was admitted to ICU for management of DKA which resolved and patient was   followed by Endocrinology, previously on insulin pump however admitted for DKA likely because of pump malfunctioning, patient unable to take insulin by pen because of policy where she lives (can not take injection and there is not nurse on site 24 hours. Patient had a delayed discharging waiting for new pump to be delivered which was successfully delivered today 1/11/2022; During patients hospital stay she was on lantus 40 units AM, 13 units bedtime, lispro 10 units before meals. Patient also found to be COVID + on admission; on room air and endorsed abdominal pain which CT abd/pelvis: negative for acute pathology; KUB + for moderate stool burden in which patient currently having adequate bowel movements. Patient is stable for discharged after be advised how to use insulin pump and to follow up with endocrinology and gastroenterology outpatient for continue workup of intermittent abd pain.       # COVID-19 PNA  - PCR positive 12/31, 1/4 and 1/11  - 1 episode of high grade fever  - was started on dexamethasone but ID said not necessary  - s/p remdesivir 200mg x1 - now stopped  - on RA and pulse ox is acceptable  - possibly worsening abdominal pain and diarrhea due to COVID and laxatives  - continue isolation per protocol  - c/w Robitussin 10mL Q4 PRN Cough  - c/w Benzonatate 100mg Oral three times a day PRN Cough      # Abdominal Pain, resolved  # Uptrending LFTs, resolved  -CT Abdomen and Pelvis w/ IV Cont 12/2/21:  No acute intra-abdominal or pelvic pathology is identified  - CLD Work up: completed, all labs collected   - LFTs uptrending, stopped RDV, steroids as per ID  - blood cultures 12/31 NGTD  - RUQ US no significant findings  - stopped miralax ,senna due to complaints of multiple episodes of diarrhea  - pepto-bismol for cramping, diarrhea  - monitor LFTs  - follow up outpatient with GI For MRI of liver lesions and colonoscopy to r/o IBD    # Diabetic Ketoacidosis, resolved  - patient treated in the ICU for DKA, now resolved  - episode of hypoglycemia, tx w/ dextrose, lispro decreased  - insulin lantus 40 units AM, 13 units bedtime, lispro 10 units before meals, SS  - followed by Endocrinology, previously on insulin pump however admitted for DKA likely because of pump malfunctioning, patient unable to take insulin by pen because of policy where she lives (can not take injection and there is not nurse on site 24 hours  - Pending delivery of new pump sent to [employer] and will be delivered to the hospital and to have virtual training to be trained on new pump    # Anxiety Disorder   - patient with hx of anxiety- started on lexapro 5 mg per psychiatry   - requests xanax occasionally    # Sinus Tachycardia, resolved  - c/w metoprolol     # Abdominal Pain   # Uptrending LFTs  -CT Abdomen and Pelvis w/ IV Cont 12/2/21:  No acute intra-abdominal or pelvic pathology is identified  - CLD Work up: completed, all labs collected   - RUQ US: no significant findings  - follow up outpatient with GI For MRI of liver lesions and colonoscopy to r/o ibd

## 2021-12-28 NOTE — DISCHARGE NOTE PROVIDER - NSDCCPCAREPLAN_GEN_ALL_CORE_FT
PRINCIPAL DISCHARGE DIAGNOSIS  Diagnosis: DKA (diabetic ketoacidosis)  Assessment and Plan of Treatment: You presented here for high acid levels in the blood due to diabetic ketoacidosis. You were treated with insulin and fluids and your blood sugars are contolled now. Please stop the insulin oum and use the injectavle insulin as prescribed. Please follow up with dr mcbride      SECONDARY DISCHARGE DIAGNOSES  Diagnosis: Liver lesion  Assessment and Plan of Treatment: You had a liver lesion that is probably fat, however other diagnosis should be excluded. Please follow up with the gastroenterologists as out patient.     PRINCIPAL DISCHARGE DIAGNOSIS  Diagnosis: DKA (diabetic ketoacidosis)  Assessment and Plan of Treatment: You presented here for high acid levels in the blood due to diabetic ketoacidosis. You were treated with insulin and fluids and your blood sugars are contolled now. Your home insulin pump was found to be malfunctioned which probably lead to you being in diabetic ketoacidosis (emergency medical condition). We had a new insulin pump deliverred to the hospital and you were shown how instructions on how to use this device. Please check your blood sugars frequently, consume a healthy diet and lifestyle. Its very important you follow closely with your endocrinologist on discharge.      SECONDARY DISCHARGE DIAGNOSES  Diagnosis: Abdominal pain  Assessment and Plan of Treatment: During your admission you experienced intermittent abdominal pain in which we performed at CT abdomen: which did not show any abnormality. We performed an xray of your abdomen which showed moderate stool burden and improved on laxatives. Please follow up with your gastroenterologist for further work up of your chronic intermittent abdominal pain symptoms as it may warrant a colonoscopy. If you experience worsening unbearable symptoms please go to your nearest emergency department.    Diagnosis: COVID-19 viremia  Assessment and Plan of Treatment:      PRINCIPAL DISCHARGE DIAGNOSIS  Diagnosis: DKA (diabetic ketoacidosis)  Assessment and Plan of Treatment: You presented here for high acid levels in the blood due to diabetic ketoacidosis. You were treated with insulin and fluids and your blood sugars are contolled now. Your home insulin pump was found to be malfunctioned which probably lead to you being in diabetic ketoacidosis (emergency medical condition). We had a new insulin pump deliverred to the hospital and you were shown how instructions on how to use this device. Please check your blood sugars frequently, consume a healthy diet and lifestyle. Its very important you follow closely with your endocrinologist on discharge.      SECONDARY DISCHARGE DIAGNOSES  Diagnosis: Abdominal pain  Assessment and Plan of Treatment: During your admission you experienced intermittent abdominal pain in which we performed at CT abdomen: which did not show any abnormality. We performed an xray of your abdomen which showed moderate stool burden and improved on laxatives. Please follow up with your gastroenterologist for further work up of your chronic intermittent abdominal pain symptoms as it may warrant a colonoscopy. If you experience worsening unbearable symptoms please go to your nearest emergency department.    Diagnosis: COVID-19 viremia  Assessment and Plan of Treatment: 1. You should restrict activities outside your home, except for getting medical care.  2. Do not go to  public areas.  3. Avoid using public transportation, ride-sharing, or taxis.  4. Separate yourself from other people and animals in your home.  5.. Wear a facemask when around other people or pets.  6. Cover your coughs and sneezes.   7. Clean your hands often.  Avoid touching your face with unwashed hands.  8. Avoid sharing personal household items.    9. Clean all “high-touch” surfaces every day.  ie counters, tabletops, doorknobs, bathroom fixtures, toilets, phones, keyboards, tablets, and bedside tables.  10.Monitor your symptoms.  12. Maitain home isolation precautions as per CDC guidelines

## 2021-12-29 LAB
ALBUMIN SERPL ELPH-MCNC: 3.5 G/DL — SIGNIFICANT CHANGE UP (ref 3.5–5.2)
ALP SERPL-CCNC: 119 U/L — HIGH (ref 30–115)
ALT FLD-CCNC: 191 U/L — HIGH (ref 0–41)
ANION GAP SERPL CALC-SCNC: 14 MMOL/L — SIGNIFICANT CHANGE UP (ref 7–14)
AST SERPL-CCNC: 117 U/L — HIGH (ref 0–41)
BILIRUB SERPL-MCNC: <0.2 MG/DL — SIGNIFICANT CHANGE UP (ref 0.2–1.2)
BUN SERPL-MCNC: 9 MG/DL — LOW (ref 10–20)
CALCIUM SERPL-MCNC: 8.4 MG/DL — LOW (ref 8.5–10.1)
CHLORIDE SERPL-SCNC: 102 MMOL/L — SIGNIFICANT CHANGE UP (ref 98–110)
CO2 SERPL-SCNC: 19 MMOL/L — SIGNIFICANT CHANGE UP (ref 17–32)
CREAT SERPL-MCNC: 0.5 MG/DL — LOW (ref 0.7–1.5)
GLUCOSE BLDC GLUCOMTR-MCNC: 120 MG/DL — HIGH (ref 70–99)
GLUCOSE BLDC GLUCOMTR-MCNC: 165 MG/DL — HIGH (ref 70–99)
GLUCOSE BLDC GLUCOMTR-MCNC: 181 MG/DL — HIGH (ref 70–99)
GLUCOSE BLDC GLUCOMTR-MCNC: 201 MG/DL — HIGH (ref 70–99)
GLUCOSE BLDC GLUCOMTR-MCNC: 45 MG/DL — CRITICAL LOW (ref 70–99)
GLUCOSE BLDC GLUCOMTR-MCNC: 66 MG/DL — LOW (ref 70–99)
GLUCOSE BLDC GLUCOMTR-MCNC: 85 MG/DL — SIGNIFICANT CHANGE UP (ref 70–99)
GLUCOSE SERPL-MCNC: 167 MG/DL — HIGH (ref 70–99)
HCT VFR BLD CALC: 31.5 % — LOW (ref 37–47)
HGB BLD-MCNC: 10 G/DL — LOW (ref 12–16)
MCHC RBC-ENTMCNC: 26.5 PG — LOW (ref 27–31)
MCHC RBC-ENTMCNC: 31.7 G/DL — LOW (ref 32–37)
MCV RBC AUTO: 83.6 FL — SIGNIFICANT CHANGE UP (ref 81–99)
NRBC # BLD: 0 /100 WBCS — SIGNIFICANT CHANGE UP (ref 0–0)
PLATELET # BLD AUTO: 352 K/UL — SIGNIFICANT CHANGE UP (ref 130–400)
POTASSIUM SERPL-MCNC: 4.2 MMOL/L — SIGNIFICANT CHANGE UP (ref 3.5–5)
POTASSIUM SERPL-SCNC: 4.2 MMOL/L — SIGNIFICANT CHANGE UP (ref 3.5–5)
PROT SERPL-MCNC: 6.2 G/DL — SIGNIFICANT CHANGE UP (ref 6–8)
RBC # BLD: 3.77 M/UL — LOW (ref 4.2–5.4)
RBC # FLD: 14.5 % — SIGNIFICANT CHANGE UP (ref 11.5–14.5)
SARS-COV-2 RNA SPEC QL NAA+PROBE: SIGNIFICANT CHANGE UP
SODIUM SERPL-SCNC: 135 MMOL/L — SIGNIFICANT CHANGE UP (ref 135–146)
WBC # BLD: 6.06 K/UL — SIGNIFICANT CHANGE UP (ref 4.8–10.8)
WBC # FLD AUTO: 6.06 K/UL — SIGNIFICANT CHANGE UP (ref 4.8–10.8)

## 2021-12-29 PROCEDURE — 99232 SBSQ HOSP IP/OBS MODERATE 35: CPT

## 2021-12-29 RX ORDER — DEXTROSE 50 % IN WATER 50 %
25 SYRINGE (ML) INTRAVENOUS ONCE
Refills: 0 | Status: DISCONTINUED | OUTPATIENT
Start: 2021-12-29 | End: 2022-01-03

## 2021-12-29 RX ORDER — DEXTROSE 50 % IN WATER 50 %
25 SYRINGE (ML) INTRAVENOUS ONCE
Refills: 0 | Status: COMPLETED | OUTPATIENT
Start: 2021-12-29 | End: 2021-12-29

## 2021-12-29 RX ORDER — INSULIN LISPRO 100/ML
1 VIAL (ML) SUBCUTANEOUS
Qty: 3 | Refills: 0
Start: 2021-12-29 | End: 2022-01-27

## 2021-12-29 RX ADMIN — Medication 12 UNIT(S): at 07:55

## 2021-12-29 RX ADMIN — FAMOTIDINE 40 MILLIGRAM(S): 10 INJECTION INTRAVENOUS at 17:48

## 2021-12-29 RX ADMIN — ENOXAPARIN SODIUM 40 MILLIGRAM(S): 100 INJECTION SUBCUTANEOUS at 12:00

## 2021-12-29 RX ADMIN — CYCLOBENZAPRINE HYDROCHLORIDE 10 MILLIGRAM(S): 10 TABLET, FILM COATED ORAL at 22:02

## 2021-12-29 RX ADMIN — Medication 1: at 07:56

## 2021-12-29 RX ADMIN — Medication 25 MILLIGRAM(S): at 17:48

## 2021-12-29 RX ADMIN — INSULIN GLARGINE 35 UNIT(S): 100 INJECTION, SOLUTION SUBCUTANEOUS at 07:54

## 2021-12-29 RX ADMIN — SENNA PLUS 2 TABLET(S): 8.6 TABLET ORAL at 22:03

## 2021-12-29 RX ADMIN — CHLORHEXIDINE GLUCONATE 1 APPLICATION(S): 213 SOLUTION TOPICAL at 11:59

## 2021-12-29 RX ADMIN — Medication 25 MILLIGRAM(S): at 06:15

## 2021-12-29 RX ADMIN — Medication 1 APPLICATION(S): at 06:11

## 2021-12-29 RX ADMIN — Medication 12 UNIT(S): at 17:46

## 2021-12-29 RX ADMIN — Medication 25 GRAM(S): at 14:31

## 2021-12-29 RX ADMIN — POLYETHYLENE GLYCOL 3350 17 GRAM(S): 17 POWDER, FOR SOLUTION ORAL at 12:00

## 2021-12-29 RX ADMIN — FAMOTIDINE 40 MILLIGRAM(S): 10 INJECTION INTRAVENOUS at 06:15

## 2021-12-29 RX ADMIN — Medication 1 APPLICATION(S): at 18:11

## 2021-12-29 RX ADMIN — Medication 12 UNIT(S): at 12:00

## 2021-12-29 RX ADMIN — Medication 81 MILLIGRAM(S): at 11:59

## 2021-12-29 RX ADMIN — ESCITALOPRAM OXALATE 5 MILLIGRAM(S): 10 TABLET, FILM COATED ORAL at 12:00

## 2021-12-29 RX ADMIN — Medication 75 MILLIGRAM(S): at 22:02

## 2021-12-29 NOTE — CHART NOTE - NSCHARTNOTEFT_GEN_A_CORE
This morning at about 9:20 am, the patient fell in the bathroom and was shaking. However she was responsive and following commands.   There was no tongue bitting or loss of bowel or bladder control. During the episodes the patient was aware of the surrounding and was even following commands.   After the episodes all her vitals including the fs was normal   Her physical exam was non focal   the patient has a hx of such symptoms with negative workup   no need for imaging   psychological pseudoseizures

## 2021-12-29 NOTE — PROGRESS NOTE ADULT - SUBJECTIVE AND OBJECTIVE BOX
Chart note reviewed, extensive discussion with mother at [facility]    Awaiting insulin pump per Dr. Galvez, insulin pen not realistic nor practical in assisted living facility    Allergy: amoxicillin (Unknown)  Ceclor (Rash)  ciprofloxacin (Unknown)  clindamycin (Unknown)  flu vaccines (Other)  gabapentin (Unknown)  Influenza Virus Vaccine, H5N1, Inactivated (Unknown)  penicillins (Unknown)      OVERNIGHT EVENTS-per resident chart note    PAST MEDICAL & SURGICAL HISTORY:  Neuropathy  right lower extremity, vaginal    Type 1 diabetes    Degenerative disc disease, thoracic    Urinary tract infection, recurrent    Gastroesophageal reflux disease, esophagitis presence not specified    Intractable headache, unspecified chronicity pattern, unspecified headache type    Major depressive disorder with single episode, in full remission  previously treated with zyprexa, celexa and lexapro    Tremor of right hand    Tachycardia    Spinal stenosis    No significant past surgical history        VITALS:  T(F): 98.3 (12-29-21 @ 08:18), Max: 98.3 (12-29-21 @ 08:18)  HR: 93 (12-29-21 @ 08:18)  BP: 153/95 (12-29-21 @ 08:18) (134/58 - 153/95)  RR: 16 (12-29-21 @ 08:18)  SpO2: --    TESTS & MEASUREMENTS:      12-28-21 @ 07:01  -  12-29-21 @ 07:00  --------------------------------------------------------  IN: 400 mL / OUT: 2 mL / NET: 398 mL    OPTHO: EOMI  NEURO: 5/5 B/L WITH TREMORS  PSYCH: A X O X 3                              10.0   6.06  )-----------( 352      ( 29 Dec 2021 04:30 )             31.5       12-29    135  |  102  |  9<L>  ----------------------------<  167<H>  4.2   |  19  |  0.5<L>    Ca    8.4<L>      29 Dec 2021 04:30    TPro  6.2  /  Alb  3.5  /  TBili  <0.2  /  DBili  x   /  AST  117<H>  /  ALT  191<H>  /  AlkPhos  119<H>  12-29    LIVER FUNCTIONS - ( 29 Dec 2021 04:30 )  Alb: 3.5 g/dL / Pro: 6.2 g/dL / ALK PHOS: 119 U/L / ALT: 191 U/L / AST: 117 U/L / GGT: x                 Culture - Urine (collected 12-23-21 @ 09:44)  Source: Clean Catch Clean Catch (Midstream)  Final Report (12-26-21 @ 15:09):    10,000 - 49,000 CFU/mL Streptococcus agalactiae (Group B) isolated    Group B streptococci are susceptible to ampicillin,    penicillin and cefazolin, but may be resistant to    erythromycin and clindamycin.    Recommendations for intrapartum prophylaxis for Group B    streptococci are penicillin or ampicillin.              MEDICATIONS:  MEDICATIONS  (STANDING):  amitriptyline Oral Tab/Cap - Peds 75 milliGRAM(s) Oral at bedtime  ammonium lactate 12% Lotion 1 Application(s) Topical every 12 hours  aspirin  chewable 81 milliGRAM(s) Oral daily  chlorhexidine 4% Liquid 1 Application(s) Topical daily  dextrose 40% Gel 15 Gram(s) Oral once  dextrose 5%. 1000 milliLiter(s) (100 mL/Hr) IV Continuous <Continuous>  dextrose 5%. 1000 milliLiter(s) (50 mL/Hr) IV Continuous <Continuous>  dextrose 50% Injectable 25 Gram(s) IV Push once  dextrose 50% Injectable 12.5 Gram(s) IV Push once  dextrose 50% Injectable 25 Gram(s) IV Push once  dextrose 50% Injectable 25 milliLiter(s) IV Push once  enoxaparin Injectable 40 milliGRAM(s) SubCutaneous daily  escitalopram 5 milliGRAM(s) Oral daily  famotidine    Tablet 40 milliGRAM(s) Oral two times a day  glucagon  Injectable 1 milliGRAM(s) IntraMuscular once  insulin glargine Injectable (LANTUS) 35 Unit(s) SubCutaneous every morning  insulin glargine Injectable (LANTUS) 10 Unit(s) SubCutaneous at bedtime  insulin lispro (ADMELOG) corrective regimen sliding scale   SubCutaneous three times a day before meals  insulin lispro Injectable (ADMELOG) 12 Unit(s) SubCutaneous three times a day before meals  metoprolol tartrate 25 milliGRAM(s) Oral two times a day  polyethylene glycol 3350 17 Gram(s) Oral daily  senna 2 Tablet(s) Oral at bedtime  sodium chloride 0.9%. 1000 milliLiter(s) (150 mL/Hr) IV Continuous <Continuous>    MEDICATIONS  (PRN):  acetaminophen     Tablet .. 650 milliGRAM(s) Oral every 6 hours PRN Moderate Pain (4 - 6)  bisacodyl Suppository 10 milliGRAM(s) Rectal daily PRN Constipation  cyclobenzaprine 10 milliGRAM(s) Oral three times a day PRN Muscle Spasm  ketorolac   Injectable 15 milliGRAM(s) IV Push three times a day PRN Severe Pain (7 - 10)      A/P  T1DM, CONTROLLED S/P DKA  PSEUDUSEIZURES  -HOPEFUL D/C IN AM  -CONT TO MONITOR FS  -NO RX FOR TREMORS REQUIRED

## 2021-12-30 LAB
ANION GAP SERPL CALC-SCNC: 14 MMOL/L — SIGNIFICANT CHANGE UP (ref 7–14)
BILIRUB SERPL-MCNC: 0.2 MG/DL — SIGNIFICANT CHANGE UP (ref 0.2–1.2)
BUN SERPL-MCNC: 6 MG/DL — LOW (ref 10–20)
CALCIUM SERPL-MCNC: 8.5 MG/DL — SIGNIFICANT CHANGE UP (ref 8.5–10.1)
CHLORIDE SERPL-SCNC: 104 MMOL/L — SIGNIFICANT CHANGE UP (ref 98–110)
CO2 SERPL-SCNC: 20 MMOL/L — SIGNIFICANT CHANGE UP (ref 17–32)
CREAT SERPL-MCNC: 0.5 MG/DL — LOW (ref 0.7–1.5)
GLUCOSE BLDC GLUCOMTR-MCNC: 114 MG/DL — HIGH (ref 70–99)
GLUCOSE BLDC GLUCOMTR-MCNC: 152 MG/DL — HIGH (ref 70–99)
GLUCOSE BLDC GLUCOMTR-MCNC: 190 MG/DL — HIGH (ref 70–99)
GLUCOSE BLDC GLUCOMTR-MCNC: 262 MG/DL — HIGH (ref 70–99)
GLUCOSE BLDC GLUCOMTR-MCNC: 286 MG/DL — HIGH (ref 70–99)
GLUCOSE BLDC GLUCOMTR-MCNC: 290 MG/DL — HIGH (ref 70–99)
GLUCOSE BLDC GLUCOMTR-MCNC: 50 MG/DL — CRITICAL LOW (ref 70–99)
GLUCOSE BLDC GLUCOMTR-MCNC: 97 MG/DL — SIGNIFICANT CHANGE UP (ref 70–99)
GLUCOSE SERPL-MCNC: 155 MG/DL — HIGH (ref 70–99)
INR BLD: 1.09 RATIO — SIGNIFICANT CHANGE UP (ref 0.65–1.3)
MELD SCORE WITH DIALYSIS: 21 POINTS — SIGNIFICANT CHANGE UP
MELD SCORE WITHOUT DIALYSIS: 7 POINTS — SIGNIFICANT CHANGE UP
POTASSIUM SERPL-MCNC: 4.2 MMOL/L — SIGNIFICANT CHANGE UP (ref 3.5–5)
POTASSIUM SERPL-SCNC: 4.2 MMOL/L — SIGNIFICANT CHANGE UP (ref 3.5–5)
PROTHROM AB SERPL-ACNC: 12.5 SEC — SIGNIFICANT CHANGE UP (ref 9.95–12.87)
SODIUM SERPL-SCNC: 138 MMOL/L — SIGNIFICANT CHANGE UP (ref 135–146)

## 2021-12-30 RX ORDER — DEXTROSE 50 % IN WATER 50 %
15 SYRINGE (ML) INTRAVENOUS ONCE
Refills: 0 | Status: COMPLETED | OUTPATIENT
Start: 2021-12-30 | End: 2021-12-30

## 2021-12-30 RX ORDER — ALPRAZOLAM 0.25 MG
0.5 TABLET ORAL ONCE
Refills: 0 | Status: DISCONTINUED | OUTPATIENT
Start: 2021-12-30 | End: 2021-12-30

## 2021-12-30 RX ORDER — ESCITALOPRAM OXALATE 10 MG/1
10 TABLET, FILM COATED ORAL DAILY
Refills: 0 | Status: DISCONTINUED | OUTPATIENT
Start: 2021-12-30 | End: 2022-01-12

## 2021-12-30 RX ORDER — INSULIN LISPRO 100/ML
10 VIAL (ML) SUBCUTANEOUS
Refills: 0 | Status: DISCONTINUED | OUTPATIENT
Start: 2021-12-30 | End: 2022-01-03

## 2021-12-30 RX ORDER — DEXTROSE 50 % IN WATER 50 %
25 SYRINGE (ML) INTRAVENOUS ONCE
Refills: 0 | Status: COMPLETED | OUTPATIENT
Start: 2021-12-30 | End: 2021-12-30

## 2021-12-30 RX ADMIN — INSULIN GLARGINE 10 UNIT(S): 100 INJECTION, SOLUTION SUBCUTANEOUS at 21:02

## 2021-12-30 RX ADMIN — ENOXAPARIN SODIUM 40 MILLIGRAM(S): 100 INJECTION SUBCUTANEOUS at 11:36

## 2021-12-30 RX ADMIN — ESCITALOPRAM OXALATE 10 MILLIGRAM(S): 10 TABLET, FILM COATED ORAL at 10:57

## 2021-12-30 RX ADMIN — Medication 1: at 11:37

## 2021-12-30 RX ADMIN — Medication 0.5 MILLIGRAM(S): at 11:34

## 2021-12-30 RX ADMIN — Medication 25 MILLIGRAM(S): at 06:23

## 2021-12-30 RX ADMIN — SENNA PLUS 2 TABLET(S): 8.6 TABLET ORAL at 21:02

## 2021-12-30 RX ADMIN — FAMOTIDINE 40 MILLIGRAM(S): 10 INJECTION INTRAVENOUS at 06:24

## 2021-12-30 RX ADMIN — FAMOTIDINE 40 MILLIGRAM(S): 10 INJECTION INTRAVENOUS at 17:08

## 2021-12-30 RX ADMIN — INSULIN GLARGINE 35 UNIT(S): 100 INJECTION, SOLUTION SUBCUTANEOUS at 08:14

## 2021-12-30 RX ADMIN — Medication 12 UNIT(S): at 11:34

## 2021-12-30 RX ADMIN — Medication 1 APPLICATION(S): at 17:06

## 2021-12-30 RX ADMIN — CYCLOBENZAPRINE HYDROCHLORIDE 10 MILLIGRAM(S): 10 TABLET, FILM COATED ORAL at 21:02

## 2021-12-30 RX ADMIN — Medication 81 MILLIGRAM(S): at 11:35

## 2021-12-30 RX ADMIN — Medication 75 MILLIGRAM(S): at 21:02

## 2021-12-30 RX ADMIN — Medication 25 MILLIGRAM(S): at 17:08

## 2021-12-30 RX ADMIN — CHLORHEXIDINE GLUCONATE 1 APPLICATION(S): 213 SOLUTION TOPICAL at 11:37

## 2021-12-30 RX ADMIN — Medication 1 APPLICATION(S): at 06:22

## 2021-12-30 RX ADMIN — Medication 1: at 08:13

## 2021-12-30 RX ADMIN — Medication 12 UNIT(S): at 08:13

## 2021-12-30 RX ADMIN — Medication 15 GRAM(S): at 15:17

## 2021-12-30 NOTE — PROGRESS NOTE ADULT - SUBJECTIVE AND OBJECTIVE BOX
Reason for Endocrinology Consult: Diabetes    HPI: 36y Female      PAST MEDICAL & SURGICAL HISTORY:  Neuropathy  right lower extremity, vaginal    Type 1 diabetes    Degenerative disc disease, thoracic    Urinary tract infection, recurrent    Gastroesophageal reflux disease, esophagitis presence not specified    Intractable headache, unspecified chronicity pattern, unspecified headache type    Major depressive disorder with single episode, in full remission  previously treated with zyprexa, celexa and lexapro    Tremor of right hand    Tachycardia    Spinal stenosis    No significant past surgical history      FAMILY HISTORY:      SH:  Smoking  Etoh:  Recreational Drugs:    Home Medications:  amitriptyline 75 mg oral tablet: 1 tab(s) orally once a day (at bedtime) (04 Nov 2021 15:06)  ammonium lactate 12% topical cream: Apply topically to affected area 2 times a day (23 Dec 2021 13:53)  aspirin 81 mg oral tablet, chewable: 1 tab(s) orally once a day (28 Dec 2021 09:29)  cyclobenzaprine 10 mg oral tablet: 1 tab(s) orally once a day (at bedtime) (28 Dec 2021 08:33)  metoprolol tartrate 25 mg oral tablet: 1 tab(s) orally 2 times a day (04 Nov 2021 15:06)  multivitamin: 1 tab(s) orally once a day (04 Nov 2021 15:06)      Current (Non-Endocrine) Meds:  acetaminophen     Tablet .. 650 milliGRAM(s) Oral every 6 hours PRN  amitriptyline Oral Tab/Cap - Peds 75 milliGRAM(s) Oral at bedtime  ammonium lactate 12% Lotion 1 Application(s) Topical every 12 hours  aspirin  chewable 81 milliGRAM(s) Oral daily  bisacodyl Suppository 10 milliGRAM(s) Rectal daily PRN  chlorhexidine 4% Liquid 1 Application(s) Topical daily  cyclobenzaprine 10 milliGRAM(s) Oral three times a day PRN  dextrose 5%. 1000 milliLiter(s) IV Continuous <Continuous>  dextrose 5%. 1000 milliLiter(s) IV Continuous <Continuous>  enoxaparin Injectable 40 milliGRAM(s) SubCutaneous daily  escitalopram 10 milliGRAM(s) Oral daily  escitalopram 5 milliGRAM(s) Oral daily  famotidine    Tablet 40 milliGRAM(s) Oral two times a day  ketorolac   Injectable 15 milliGRAM(s) IV Push three times a day PRN  metoprolol tartrate 25 milliGRAM(s) Oral two times a day  polyethylene glycol 3350 17 Gram(s) Oral daily  senna 2 Tablet(s) Oral at bedtime  sodium chloride 0.9%. 1000 milliLiter(s) IV Continuous <Continuous>      Current Endocrine Meds:   dextrose 40% Gel 15 Gram(s) Oral once  dextrose 50% Injectable 25 Gram(s) IV Push once  dextrose 50% Injectable 12.5 Gram(s) IV Push once  dextrose 50% Injectable 25 Gram(s) IV Push once  dextrose 50% Injectable 25 milliLiter(s) IV Push once  glucagon  Injectable 1 milliGRAM(s) IntraMuscular once  insulin glargine Injectable (LANTUS) 35 Unit(s) SubCutaneous every morning  insulin glargine Injectable (LANTUS) 10 Unit(s) SubCutaneous at bedtime  insulin lispro (ADMELOG) corrective regimen sliding scale   SubCutaneous three times a day before meals  insulin lispro Injectable (ADMELOG) 12 Unit(s) SubCutaneous three times a day before meals      Allergies:  amoxicillin (Unknown)  Ceclor (Rash)  ciprofloxacin (Unknown)  clindamycin (Unknown)  flu vaccines (Other)  gabapentin (Unknown)  Influenza Virus Vaccine, H5N1, Inactivated (Unknown)  penicillins (Unknown)      ROS:  Denies the following except as indicated.    General: weight loss/weight gain, decreased appetite, fatigue, fever  Eyes: blurry vision, double vision  ENT: neck swelling, dysphagia, voice changes   CV: palpitations, SOB, chest pain, cough  GI: nausea, vomiting, diarrhea, constipation, abdominal pain  : nocturia,  polyuria, dysuria  Endo: decreased libido, heat/cold intolerance, jitteriness  MSK: arthralgias, myalgias  Skin: rash, dryness, diaphoresis  Neuro: pedal numbness,pedal paresthesias, pedal pain        Vital Signs Last 24 Hrs  T(C): 36.3 (30 Dec 2021 09:18), Max: 36.3 (30 Dec 2021 09:18)  T(F): 97.4 (30 Dec 2021 09:18), Max: 97.4 (30 Dec 2021 09:18)  HR: 92 (30 Dec 2021 09:18) (92 - 99)  BP: 136/74 (30 Dec 2021 09:18) (136/74 - 137/72)  BP(mean): --  RR: 18 (30 Dec 2021 09:18) (18 - 18)  SpO2: 98% (30 Dec 2021 09:18) (98% - 98%)  Constitutional: WN/WD in NAD.   Neck: no thyromegaly or palpable thyroid nodules   Respiratory: lungs CTAB.  Cardiovascular: regular rate and rhythm, normal S1 and S2, no audible murmurs  GI: soft, NT/ND, no masses/HSM appreciated.  Ext: no edema, no ulcers, pedal pulses palpable bilaterally  Neurology: no tremor, monofilament sensation intact in feet  Psychiatric: A&O x 3, normal affect/mood.        LABS:                        10.0   6.06  )-----------( 352      ( 29 Dec 2021 04:30 )             31.5     12-30    138  |  104  |  6<L>  ----------------------------<  155<H>  4.2   |  20  |  0.5<L>    Ca    8.5      30 Dec 2021 06:14    TPro  x   /  Alb  x   /  TBili  0.2  /  DBili  x   /  AST  x   /  ALT  x   /  AlkPhos  x   12-30    PT/INR - ( 30 Dec 2021 06:14 )   PT: 12.50 sec;   INR: 1.09 ratio                                            RADIOLOGY & ADDITIONAL STUDIES:    A/P:36yFemale    1.  DM  For now:  Glargine-    units at bedtime  Lispro-    units three times a day before meals   Will continue to monitor     At discharge, recommend:      Pt can follow up at discharge with:    Above discussed with resident.

## 2021-12-30 NOTE — PROGRESS NOTE ADULT - ASSESSMENT
Diabetic Ketoacidosis- resolved  - patient treated in the ICU for DKA, now resolved  - followed by Endocrinology, previously on insulin pump  - Per Dr. Hendricks, insulin pump will be available on tuesday, so the family are refusing dc and wants to wait till tuesday  has episode of hypoglycemia, ttt with dextrose and her lispro decreased       Anxiety Disorder   - patient with hx of anxiety- started on lexapro 5 mg per psychiatry  - continue to monitor   -required xanax 0.5 today     Sinus Tachycardia  - continue with metoprolol       Abdominal Pain   -CT Abdomen and Pelvis w/ IV Cont (12.02.21 @ 12:13)  No acute intra-abdominal or pelvic pathology is identified  - CLD Work up: completed, all labs collected   - follow up outpatient with GI For MRI of liver lesions   - complaining of abdominal pain 2/2 to constipation

## 2021-12-30 NOTE — PROGRESS NOTE ADULT - ASSESSMENT
type 1 diabetes, glucoses are controlled on multiple insulin injections, but reduce lispro dosage to 10 units before meals, family and patient is refusing to go home without insulin pump, her old pump is out of warranty, and no longer available, she will be upgraded to a new insulin pump, but all her settings etc., need to be reprogrammed into the new pump,so no diabetes educators at the weekend, so we are looking towards next monday for discharge, dr rebecca daniels is on the weekend,he may be able to help, new pump needs to be shipped to her first.. please call dr perry , or dr daniels at [facility] .

## 2021-12-30 NOTE — PROGRESS NOTE ADULT - SUBJECTIVE AND OBJECTIVE BOX
SUBJECTIVE:    Patient is a 36y old Female who presents with a chief complaint of Mild DKA (30 Dec 2021 13:33)    Currently admitted to medicine with the primary diagnosis of DKA (diabetic ketoacidosis)       Today is hospital day 7d. This morning she is resting comfortably in bed and reports no new issues or overnight events.     PAST MEDICAL & SURGICAL HISTORY  Neuropathy  right lower extremity, vaginal    Type 1 diabetes    Degenerative disc disease, thoracic    Urinary tract infection, recurrent    Gastroesophageal reflux disease, esophagitis presence not specified    Intractable headache, unspecified chronicity pattern, unspecified headache type    Major depressive disorder with single episode, in full remission  previously treated with zyprexa, celexa and lexapro    Tremor of right hand    Tachycardia    Spinal stenosis    No significant past surgical history      SOCIAL HISTORY:  Negative for smoking/alcohol/drug use.     ALLERGIES:  amoxicillin (Unknown)  Ceclor (Rash)  ciprofloxacin (Unknown)  clindamycin (Unknown)  flu vaccines (Other)  gabapentin (Unknown)  Influenza Virus Vaccine, H5N1, Inactivated (Unknown)  penicillins (Unknown)    MEDICATIONS:  STANDING MEDICATIONS  amitriptyline Oral Tab/Cap - Peds 75 milliGRAM(s) Oral at bedtime  ammonium lactate 12% Lotion 1 Application(s) Topical every 12 hours  aspirin  chewable 81 milliGRAM(s) Oral daily  chlorhexidine 4% Liquid 1 Application(s) Topical daily  dextrose 40% Gel 15 Gram(s) Oral once  dextrose 40% Gel 15 Gram(s) Oral once  dextrose 5%. 1000 milliLiter(s) IV Continuous <Continuous>  dextrose 5%. 1000 milliLiter(s) IV Continuous <Continuous>  dextrose 50% Injectable 25 Gram(s) IV Push once  dextrose 50% Injectable 12.5 Gram(s) IV Push once  dextrose 50% Injectable 25 Gram(s) IV Push once  dextrose 50% Injectable 25 milliLiter(s) IV Push once  dextrose 50% Injectable 25 milliLiter(s) IV Push once  enoxaparin Injectable 40 milliGRAM(s) SubCutaneous daily  escitalopram 10 milliGRAM(s) Oral daily  escitalopram 5 milliGRAM(s) Oral daily  famotidine    Tablet 40 milliGRAM(s) Oral two times a day  glucagon  Injectable 1 milliGRAM(s) IntraMuscular once  insulin glargine Injectable (LANTUS) 35 Unit(s) SubCutaneous every morning  insulin glargine Injectable (LANTUS) 10 Unit(s) SubCutaneous at bedtime  insulin lispro (ADMELOG) corrective regimen sliding scale   SubCutaneous three times a day before meals  insulin lispro Injectable (ADMELOG) 10 Unit(s) SubCutaneous three times a day before meals  metoprolol tartrate 25 milliGRAM(s) Oral two times a day  polyethylene glycol 3350 17 Gram(s) Oral daily  senna 2 Tablet(s) Oral at bedtime  sodium chloride 0.9%. 1000 milliLiter(s) IV Continuous <Continuous>    PRN MEDICATIONS  acetaminophen     Tablet .. 650 milliGRAM(s) Oral every 6 hours PRN  bisacodyl Suppository 10 milliGRAM(s) Rectal daily PRN  cyclobenzaprine 10 milliGRAM(s) Oral three times a day PRN  ketorolac   Injectable 15 milliGRAM(s) IV Push three times a day PRN    VITALS:   T(F): 97.4  HR: 92  BP: 136/74  RR: 18  SpO2: 98%    LABS:                        10.0   6.06  )-----------( 352      ( 29 Dec 2021 04:30 )             31.5     12-30    138  |  104  |  6<L>  ----------------------------<  155<H>  4.2   |  20  |  0.5<L>    Ca    8.5      30 Dec 2021 06:14    TPro  x   /  Alb  x   /  TBili  0.2  /  DBili  x   /  AST  x   /  ALT  x   /  AlkPhos  x   12-30    PT/INR - ( 30 Dec 2021 06:14 )   PT: 12.50 sec;   INR: 1.09 ratio                       RADIOLOGY:    PHYSICAL EXAM:  GEN: No acute distress  LUNGS: Clear to auscultation bilaterally   HEART: S1/S2 present. RRR.   ABD: Soft, non-tender, non-distended. Bowel sounds present  EXT: NC/NC/NE/2+PP/JOHNSON/Skin Intact.   NEURO: AAOX3    Intravenous access:   NG tube:   Freitas Catheter:

## 2021-12-31 LAB
ALBUMIN SERPL ELPH-MCNC: 3.8 G/DL — SIGNIFICANT CHANGE UP (ref 3.5–5.2)
ALP SERPL-CCNC: 166 U/L — HIGH (ref 30–115)
ALT FLD-CCNC: 294 U/L — HIGH (ref 0–41)
ANION GAP SERPL CALC-SCNC: 13 MMOL/L — SIGNIFICANT CHANGE UP (ref 7–14)
AST SERPL-CCNC: 199 U/L — HIGH (ref 0–41)
BILIRUB SERPL-MCNC: <0.2 MG/DL — SIGNIFICANT CHANGE UP (ref 0.2–1.2)
BUN SERPL-MCNC: 7 MG/DL — LOW (ref 10–20)
CALCIUM SERPL-MCNC: 8.5 MG/DL — SIGNIFICANT CHANGE UP (ref 8.5–10.1)
CHLORIDE SERPL-SCNC: 101 MMOL/L — SIGNIFICANT CHANGE UP (ref 98–110)
CO2 SERPL-SCNC: 19 MMOL/L — SIGNIFICANT CHANGE UP (ref 17–32)
CREAT SERPL-MCNC: 0.5 MG/DL — LOW (ref 0.7–1.5)
GLUCOSE BLDC GLUCOMTR-MCNC: 167 MG/DL — HIGH (ref 70–99)
GLUCOSE BLDC GLUCOMTR-MCNC: 193 MG/DL — HIGH (ref 70–99)
GLUCOSE BLDC GLUCOMTR-MCNC: 210 MG/DL — HIGH (ref 70–99)
GLUCOSE BLDC GLUCOMTR-MCNC: 218 MG/DL — HIGH (ref 70–99)
GLUCOSE BLDC GLUCOMTR-MCNC: 219 MG/DL — HIGH (ref 70–99)
GLUCOSE BLDC GLUCOMTR-MCNC: 238 MG/DL — HIGH (ref 70–99)
GLUCOSE SERPL-MCNC: 292 MG/DL — HIGH (ref 70–99)
HCT VFR BLD CALC: 28.7 % — LOW (ref 37–47)
HGB BLD-MCNC: 9.1 G/DL — LOW (ref 12–16)
MAGNESIUM SERPL-MCNC: 1.5 MG/DL — LOW (ref 1.8–2.4)
MCHC RBC-ENTMCNC: 26.7 PG — LOW (ref 27–31)
MCHC RBC-ENTMCNC: 31.7 G/DL — LOW (ref 32–37)
MCV RBC AUTO: 84.2 FL — SIGNIFICANT CHANGE UP (ref 81–99)
NRBC # BLD: 0 /100 WBCS — SIGNIFICANT CHANGE UP (ref 0–0)
PLATELET # BLD AUTO: 357 K/UL — SIGNIFICANT CHANGE UP (ref 130–400)
POTASSIUM SERPL-MCNC: 4 MMOL/L — SIGNIFICANT CHANGE UP (ref 3.5–5)
POTASSIUM SERPL-SCNC: 4 MMOL/L — SIGNIFICANT CHANGE UP (ref 3.5–5)
PROT SERPL-MCNC: 6.4 G/DL — SIGNIFICANT CHANGE UP (ref 6–8)
RBC # BLD: 3.41 M/UL — LOW (ref 4.2–5.4)
RBC # FLD: 14.5 % — SIGNIFICANT CHANGE UP (ref 11.5–14.5)
SODIUM SERPL-SCNC: 133 MMOL/L — LOW (ref 135–146)
WBC # BLD: 6.47 K/UL — SIGNIFICANT CHANGE UP (ref 4.8–10.8)
WBC # FLD AUTO: 6.47 K/UL — SIGNIFICANT CHANGE UP (ref 4.8–10.8)

## 2021-12-31 PROCEDURE — 71045 X-RAY EXAM CHEST 1 VIEW: CPT | Mod: 26

## 2021-12-31 PROCEDURE — 99232 SBSQ HOSP IP/OBS MODERATE 35: CPT | Mod: GC

## 2021-12-31 RX ORDER — ALPRAZOLAM 0.25 MG
0.5 TABLET ORAL ONCE
Refills: 0 | Status: DISCONTINUED | OUTPATIENT
Start: 2021-12-31 | End: 2021-12-31

## 2021-12-31 RX ORDER — MEROPENEM 1 G/30ML
1000 INJECTION INTRAVENOUS ONCE
Refills: 0 | Status: COMPLETED | OUTPATIENT
Start: 2021-12-31 | End: 2021-12-31

## 2021-12-31 RX ADMIN — Medication 1: at 11:32

## 2021-12-31 RX ADMIN — Medication 25 MILLIGRAM(S): at 16:49

## 2021-12-31 RX ADMIN — Medication 650 MILLIGRAM(S): at 21:12

## 2021-12-31 RX ADMIN — CHLORHEXIDINE GLUCONATE 1 APPLICATION(S): 213 SOLUTION TOPICAL at 11:31

## 2021-12-31 RX ADMIN — Medication 10 UNIT(S): at 16:45

## 2021-12-31 RX ADMIN — Medication 1 APPLICATION(S): at 16:49

## 2021-12-31 RX ADMIN — Medication 10 UNIT(S): at 07:38

## 2021-12-31 RX ADMIN — MEROPENEM 100 MILLIGRAM(S): 1 INJECTION INTRAVENOUS at 23:00

## 2021-12-31 RX ADMIN — ESCITALOPRAM OXALATE 10 MILLIGRAM(S): 10 TABLET, FILM COATED ORAL at 08:22

## 2021-12-31 RX ADMIN — INSULIN GLARGINE 35 UNIT(S): 100 INJECTION, SOLUTION SUBCUTANEOUS at 07:39

## 2021-12-31 RX ADMIN — FAMOTIDINE 20 MILLIGRAM(S): 10 INJECTION INTRAVENOUS at 16:47

## 2021-12-31 RX ADMIN — Medication 0.5 MILLIGRAM(S): at 15:28

## 2021-12-31 RX ADMIN — ESCITALOPRAM OXALATE 5 MILLIGRAM(S): 10 TABLET, FILM COATED ORAL at 08:22

## 2021-12-31 RX ADMIN — CYCLOBENZAPRINE HYDROCHLORIDE 10 MILLIGRAM(S): 10 TABLET, FILM COATED ORAL at 11:22

## 2021-12-31 RX ADMIN — Medication 75 MILLIGRAM(S): at 21:13

## 2021-12-31 RX ADMIN — Medication 2: at 16:44

## 2021-12-31 RX ADMIN — Medication 1 APPLICATION(S): at 05:54

## 2021-12-31 RX ADMIN — ENOXAPARIN SODIUM 40 MILLIGRAM(S): 100 INJECTION SUBCUTANEOUS at 11:20

## 2021-12-31 RX ADMIN — Medication 2: at 07:37

## 2021-12-31 RX ADMIN — INSULIN GLARGINE 10 UNIT(S): 100 INJECTION, SOLUTION SUBCUTANEOUS at 21:36

## 2021-12-31 RX ADMIN — Medication 81 MILLIGRAM(S): at 11:19

## 2021-12-31 RX ADMIN — FAMOTIDINE 40 MILLIGRAM(S): 10 INJECTION INTRAVENOUS at 05:53

## 2021-12-31 RX ADMIN — Medication 25 MILLIGRAM(S): at 05:53

## 2021-12-31 RX ADMIN — Medication 10 UNIT(S): at 11:33

## 2021-12-31 RX ADMIN — SENNA PLUS 2 TABLET(S): 8.6 TABLET ORAL at 21:14

## 2021-12-31 RX ADMIN — POLYETHYLENE GLYCOL 3350 17 GRAM(S): 17 POWDER, FOR SOLUTION ORAL at 11:20

## 2021-12-31 RX ADMIN — Medication 650 MILLIGRAM(S): at 15:28

## 2021-12-31 NOTE — PROGRESS NOTE ADULT - SUBJECTIVE AND OBJECTIVE BOX
SUBJECTIVE:    Patient is a 36y old Female who presents with a chief complaint of Mild DKA (30 Dec 2021 15:14)    Currently admitted to medicine with the primary diagnosis of DKA (diabetic ketoacidosis)       Today is hospital day 8d. This morning she is resting comfortably in bed and reports no new issues or overnight events.     PAST MEDICAL & SURGICAL HISTORY  Neuropathy  right lower extremity, vaginal    Type 1 diabetes    Degenerative disc disease, thoracic    Urinary tract infection, recurrent    Gastroesophageal reflux disease, esophagitis presence not specified    Intractable headache, unspecified chronicity pattern, unspecified headache type    Major depressive disorder with single episode, in full remission  previously treated with zyprexa, celexa and lexapro    Tremor of right hand    Tachycardia    Spinal stenosis    No significant past surgical history      SOCIAL HISTORY:  Negative for smoking/alcohol/drug use.     ALLERGIES:  amoxicillin (Unknown)  Ceclor (Rash)  ciprofloxacin (Unknown)  clindamycin (Unknown)  flu vaccines (Other)  gabapentin (Unknown)  Influenza Virus Vaccine, H5N1, Inactivated (Unknown)  penicillins (Unknown)    MEDICATIONS:  STANDING MEDICATIONS  amitriptyline Oral Tab/Cap - Peds 75 milliGRAM(s) Oral at bedtime  ammonium lactate 12% Lotion 1 Application(s) Topical every 12 hours  aspirin  chewable 81 milliGRAM(s) Oral daily  chlorhexidine 4% Liquid 1 Application(s) Topical daily  dextrose 40% Gel 15 Gram(s) Oral once  dextrose 5%. 1000 milliLiter(s) IV Continuous <Continuous>  dextrose 5%. 1000 milliLiter(s) IV Continuous <Continuous>  dextrose 50% Injectable 25 Gram(s) IV Push once  dextrose 50% Injectable 12.5 Gram(s) IV Push once  dextrose 50% Injectable 25 Gram(s) IV Push once  dextrose 50% Injectable 25 milliLiter(s) IV Push once  enoxaparin Injectable 40 milliGRAM(s) SubCutaneous daily  escitalopram 10 milliGRAM(s) Oral daily  escitalopram 5 milliGRAM(s) Oral daily  famotidine    Tablet 40 milliGRAM(s) Oral two times a day  glucagon  Injectable 1 milliGRAM(s) IntraMuscular once  insulin glargine Injectable (LANTUS) 35 Unit(s) SubCutaneous every morning  insulin glargine Injectable (LANTUS) 10 Unit(s) SubCutaneous at bedtime  insulin lispro (ADMELOG) corrective regimen sliding scale   SubCutaneous three times a day before meals  insulin lispro Injectable (ADMELOG) 10 Unit(s) SubCutaneous three times a day before meals  metoprolol tartrate 25 milliGRAM(s) Oral two times a day  polyethylene glycol 3350 17 Gram(s) Oral daily  senna 2 Tablet(s) Oral at bedtime  sodium chloride 0.9%. 1000 milliLiter(s) IV Continuous <Continuous>    PRN MEDICATIONS  acetaminophen     Tablet .. 650 milliGRAM(s) Oral every 6 hours PRN  bisacodyl Suppository 10 milliGRAM(s) Rectal daily PRN  cyclobenzaprine 10 milliGRAM(s) Oral three times a day PRN    VITALS:   T(F): 97.5  HR: 95  BP: 151/86  RR: 18  SpO2: --    LABS:    12-30    138  |  104  |  6<L>  ----------------------------<  155<H>  4.2   |  20  |  0.5<L>    Ca    8.5      30 Dec 2021 06:14    TPro  x   /  Alb  x   /  TBili  0.2  /  DBili  x   /  AST  x   /  ALT  x   /  AlkPhos  x   12-30    PT/INR - ( 30 Dec 2021 06:14 )   PT: 12.50 sec;   INR: 1.09 ratio                       RADIOLOGY:    PHYSICAL EXAM:  GEN: No acute distress  LUNGS: Clear to auscultation bilaterally   HEART: S1/S2 present. RRR.   ABD: Soft, non-tender, non-distended. Bowel sounds present  EXT: NC/NC/NE/2+PP/JOHNSON/Skin Intact.   NEURO: AAOX3    Intravenous access:   NG tube:   Freitas Catheter:

## 2021-12-31 NOTE — PROGRESS NOTE ADULT - ASSESSMENT
Diabetic Ketoacidosis- resolved  - patient treated in the ICU for DKA, now resolved  - followed by Endocrinology, previously on insulin pump  - Per Dr. Hendricks, insulin pump will be available on tuesday, so the family are refusing dc and wants to wait till tuesday  has episode of hypoglycemia, ttt with dextrose and her lispro decreased       Anxiety Disorder   - patient with hx of anxiety- started on lexapro 5 mg per psychiatry  - continue to monitor   -required xanax 0.5 today     Sinus Tachycardia  - continue with metoprolol       Abdominal Pain   -CT Abdomen and Pelvis w/ IV Cont (12.02.21 @ 12:13)  No acute intra-abdominal or pelvic pathology is identified  - CLD Work up: completed, all labs collected   - follow up outpatient with GI For MRI of liver lesions and colonoscopy to r/o ibd

## 2022-01-01 LAB
ALBUMIN SERPL ELPH-MCNC: 3.6 G/DL — SIGNIFICANT CHANGE UP (ref 3.5–5.2)
ALBUMIN SERPL ELPH-MCNC: 4 G/DL — SIGNIFICANT CHANGE UP (ref 3.5–5.2)
ALP SERPL-CCNC: 151 U/L — HIGH (ref 30–115)
ALP SERPL-CCNC: 176 U/L — HIGH (ref 30–115)
ALT FLD-CCNC: 287 U/L — HIGH (ref 0–41)
ALT FLD-CCNC: 436 U/L — HIGH (ref 0–41)
ANION GAP SERPL CALC-SCNC: 14 MMOL/L — SIGNIFICANT CHANGE UP (ref 7–14)
APPEARANCE UR: CLEAR — SIGNIFICANT CHANGE UP
AST SERPL-CCNC: 186 U/L — HIGH (ref 0–41)
AST SERPL-CCNC: 310 U/L — HIGH (ref 0–41)
BILIRUB DIRECT SERPL-MCNC: <0.2 MG/DL — SIGNIFICANT CHANGE UP (ref 0–0.3)
BILIRUB INDIRECT FLD-MCNC: SIGNIFICANT CHANGE UP MG/DL (ref 0.2–1.2)
BILIRUB SERPL-MCNC: <0.2 MG/DL — SIGNIFICANT CHANGE UP (ref 0.2–1.2)
BILIRUB SERPL-MCNC: <0.2 MG/DL — SIGNIFICANT CHANGE UP (ref 0.2–1.2)
BILIRUB UR-MCNC: NEGATIVE — SIGNIFICANT CHANGE UP
BUN SERPL-MCNC: 6 MG/DL — LOW (ref 10–20)
CALCIUM SERPL-MCNC: 8.5 MG/DL — SIGNIFICANT CHANGE UP (ref 8.5–10.1)
CHLORIDE SERPL-SCNC: 103 MMOL/L — SIGNIFICANT CHANGE UP (ref 98–110)
CO2 SERPL-SCNC: 18 MMOL/L — SIGNIFICANT CHANGE UP (ref 17–32)
COLOR SPEC: SIGNIFICANT CHANGE UP
CREAT SERPL-MCNC: 0.6 MG/DL — LOW (ref 0.7–1.5)
CREAT SERPL-MCNC: 0.6 MG/DL — LOW (ref 0.7–1.5)
DIFF PNL FLD: NEGATIVE — SIGNIFICANT CHANGE UP
GLUCOSE BLDC GLUCOMTR-MCNC: 151 MG/DL — HIGH (ref 70–99)
GLUCOSE BLDC GLUCOMTR-MCNC: 180 MG/DL — HIGH (ref 70–99)
GLUCOSE BLDC GLUCOMTR-MCNC: 240 MG/DL — HIGH (ref 70–99)
GLUCOSE BLDC GLUCOMTR-MCNC: 252 MG/DL — HIGH (ref 70–99)
GLUCOSE BLDC GLUCOMTR-MCNC: 275 MG/DL — HIGH (ref 70–99)
GLUCOSE SERPL-MCNC: 251 MG/DL — HIGH (ref 70–99)
GLUCOSE UR QL: ABNORMAL
HCT VFR BLD CALC: 30.2 % — LOW (ref 37–47)
HGB BLD-MCNC: 9.7 G/DL — LOW (ref 12–16)
INR BLD: 1.09 RATIO — SIGNIFICANT CHANGE UP (ref 0.65–1.3)
KETONES UR-MCNC: ABNORMAL
LEUKOCYTE ESTERASE UR-ACNC: NEGATIVE — SIGNIFICANT CHANGE UP
MAGNESIUM SERPL-MCNC: 1.6 MG/DL — LOW (ref 1.8–2.4)
MCHC RBC-ENTMCNC: 26.9 PG — LOW (ref 27–31)
MCHC RBC-ENTMCNC: 32.1 G/DL — SIGNIFICANT CHANGE UP (ref 32–37)
MCV RBC AUTO: 83.9 FL — SIGNIFICANT CHANGE UP (ref 81–99)
NITRITE UR-MCNC: NEGATIVE — SIGNIFICANT CHANGE UP
NRBC # BLD: 0 /100 WBCS — SIGNIFICANT CHANGE UP (ref 0–0)
PH UR: 6.5 — SIGNIFICANT CHANGE UP (ref 5–8)
PLATELET # BLD AUTO: 317 K/UL — SIGNIFICANT CHANGE UP (ref 130–400)
POTASSIUM SERPL-MCNC: 4.3 MMOL/L — SIGNIFICANT CHANGE UP (ref 3.5–5)
POTASSIUM SERPL-SCNC: 4.3 MMOL/L — SIGNIFICANT CHANGE UP (ref 3.5–5)
PROCALCITONIN SERPL-MCNC: 0.47 NG/ML — HIGH (ref 0.02–0.1)
PROT SERPL-MCNC: 6.3 G/DL — SIGNIFICANT CHANGE UP (ref 6–8)
PROT SERPL-MCNC: 7.2 G/DL — SIGNIFICANT CHANGE UP (ref 6–8)
PROT UR-MCNC: NEGATIVE — SIGNIFICANT CHANGE UP
PROTHROM AB SERPL-ACNC: 12.5 SEC — SIGNIFICANT CHANGE UP (ref 9.95–12.87)
RBC # BLD: 3.6 M/UL — LOW (ref 4.2–5.4)
RBC # FLD: 14.5 % — SIGNIFICANT CHANGE UP (ref 11.5–14.5)
SARS-COV-2 RNA SPEC QL NAA+PROBE: DETECTED
SODIUM SERPL-SCNC: 135 MMOL/L — SIGNIFICANT CHANGE UP (ref 135–146)
SP GR SPEC: 1.02 — SIGNIFICANT CHANGE UP (ref 1.01–1.03)
UROBILINOGEN FLD QL: SIGNIFICANT CHANGE UP
WBC # BLD: 6.05 K/UL — SIGNIFICANT CHANGE UP (ref 4.8–10.8)
WBC # FLD AUTO: 6.05 K/UL — SIGNIFICANT CHANGE UP (ref 4.8–10.8)

## 2022-01-01 PROCEDURE — 99233 SBSQ HOSP IP/OBS HIGH 50: CPT

## 2022-01-01 RX ORDER — GUAIFENESIN/DEXTROMETHORPHAN 600MG-30MG
10 TABLET, EXTENDED RELEASE 12 HR ORAL EVERY 4 HOURS
Refills: 0 | Status: DISCONTINUED | OUTPATIENT
Start: 2022-01-01 | End: 2022-01-12

## 2022-01-01 RX ORDER — REMDESIVIR 5 MG/ML
200 INJECTION INTRAVENOUS ONCE
Refills: 0 | Status: COMPLETED | OUTPATIENT
Start: 2022-01-01 | End: 2022-01-01

## 2022-01-01 RX ORDER — MAGNESIUM SULFATE 500 MG/ML
2 VIAL (ML) INJECTION EVERY 4 HOURS
Refills: 0 | Status: COMPLETED | OUTPATIENT
Start: 2022-01-01 | End: 2022-01-01

## 2022-01-01 RX ORDER — MAGNESIUM SULFATE 500 MG/ML
2 VIAL (ML) INJECTION ONCE
Refills: 0 | Status: COMPLETED | OUTPATIENT
Start: 2022-01-01 | End: 2022-01-03

## 2022-01-01 RX ORDER — MAGNESIUM SULFATE 500 MG/ML
2 VIAL (ML) INJECTION EVERY 4 HOURS
Refills: 0 | Status: DISCONTINUED | OUTPATIENT
Start: 2022-01-01 | End: 2022-01-01

## 2022-01-01 RX ORDER — DEXAMETHASONE 0.5 MG/5ML
6 ELIXIR ORAL DAILY
Refills: 0 | Status: DISCONTINUED | OUTPATIENT
Start: 2022-01-01 | End: 2022-01-02

## 2022-01-01 RX ADMIN — Medication 75 MILLIGRAM(S): at 22:08

## 2022-01-01 RX ADMIN — ENOXAPARIN SODIUM 40 MILLIGRAM(S): 100 INJECTION SUBCUTANEOUS at 11:47

## 2022-01-01 RX ADMIN — INSULIN GLARGINE 35 UNIT(S): 100 INJECTION, SOLUTION SUBCUTANEOUS at 08:31

## 2022-01-01 RX ADMIN — Medication 1: at 17:20

## 2022-01-01 RX ADMIN — POLYETHYLENE GLYCOL 3350 17 GRAM(S): 17 POWDER, FOR SOLUTION ORAL at 11:48

## 2022-01-01 RX ADMIN — FAMOTIDINE 40 MILLIGRAM(S): 10 INJECTION INTRAVENOUS at 05:35

## 2022-01-01 RX ADMIN — Medication 10 UNIT(S): at 11:43

## 2022-01-01 RX ADMIN — Medication 10 UNIT(S): at 17:20

## 2022-01-01 RX ADMIN — REMDESIVIR 500 MILLIGRAM(S): 5 INJECTION INTRAVENOUS at 17:23

## 2022-01-01 RX ADMIN — Medication 3: at 11:42

## 2022-01-01 RX ADMIN — Medication 25 GRAM(S): at 05:35

## 2022-01-01 RX ADMIN — Medication 1 APPLICATION(S): at 18:18

## 2022-01-01 RX ADMIN — Medication 1 APPLICATION(S): at 05:30

## 2022-01-01 RX ADMIN — FAMOTIDINE 40 MILLIGRAM(S): 10 INJECTION INTRAVENOUS at 17:25

## 2022-01-01 RX ADMIN — ESCITALOPRAM OXALATE 10 MILLIGRAM(S): 10 TABLET, FILM COATED ORAL at 11:47

## 2022-01-01 RX ADMIN — SENNA PLUS 2 TABLET(S): 8.6 TABLET ORAL at 22:08

## 2022-01-01 RX ADMIN — ESCITALOPRAM OXALATE 5 MILLIGRAM(S): 10 TABLET, FILM COATED ORAL at 11:49

## 2022-01-01 RX ADMIN — INSULIN GLARGINE 10 UNIT(S): 100 INJECTION, SOLUTION SUBCUTANEOUS at 22:08

## 2022-01-01 RX ADMIN — Medication 10 UNIT(S): at 08:31

## 2022-01-01 RX ADMIN — Medication 650 MILLIGRAM(S): at 03:15

## 2022-01-01 RX ADMIN — Medication 6 MILLIGRAM(S): at 17:24

## 2022-01-01 RX ADMIN — Medication 81 MILLIGRAM(S): at 11:47

## 2022-01-01 RX ADMIN — Medication 25 MILLIGRAM(S): at 05:35

## 2022-01-01 RX ADMIN — Medication 25 GRAM(S): at 03:00

## 2022-01-01 RX ADMIN — Medication 3: at 08:30

## 2022-01-01 RX ADMIN — Medication 25 MILLIGRAM(S): at 17:25

## 2022-01-01 NOTE — PROGRESS NOTE ADULT - SUBJECTIVE AND OBJECTIVE BOX
VALENTINASTARLA DEELE  36y  Female      Patient is a 36y old  Female who presents with a chief complaint of Mild DKA (31 Dec 2021 09:29)      INTERVAL HPI/OVERNIGHT EVENTS: patient spike fever(102F) and required supplemental 02. Gave a stat dose of Meropenem x1. COVID swab done and came up positive. HDS and stable on NC. per nursing coughing alot.       REVIEW OF SYSTEMS:  CARDIOVASCULAR: No chest pain, palpitations, dizziness, or leg swelling  GASTROINTESTINAL: No abdominal or epigastric pain. No nausea, vomiting, or hematemesis; No diarrhea or constipation. No melena or hematochezia.  GENITOURINARY: No dysuria, frequency, hematuria, or incontinence  NEUROLOGICAL: No headaches, memory loss, loss of strength, numbness, or tremors  SKIN: No itching, burning, rashes, or lesions   MUSCULOSKELETAL: No joint pain or swelling; No muscle, back, or extremity pain  PSYCHIATRIC: No depression, anxiety, mood swings, or difficulty sleeping  All other review of systems negative    VITALS  T(C): 37.7 (22 @ 12:12), Max: 39.3 (21 @ 21:00)  HR: 94 (22 @ 08:10) (94 - 119)  BP: 122/77 (22 @ 08:10) (122/59 - 141/79)  RR: 20 (22 @ 08:10) (18 - 20)  SpO2: --  Wt(kg): --Vital Signs Last 24 Hrs  T(C): 37.7 (2022 12:12), Max: 39.3 (31 Dec 2021 21:00)  T(F): 99.8 (2022 12:12), Max: 102.7 (31 Dec 2021 21:00)  HR: 94 (2022 08:10) (94 - 119)  BP: 122/77 (2022 08:10) (122/59 - 141/79)  BP(mean): --  RR: 20 (2022 08:10) (18 - 20)  SpO2: --      21 @ 07:01  -  22 @ 07:00  --------------------------------------------------------  IN: 1650 mL / OUT: 500 mL / NET: 1150 mL        PHYSICAL EXAM:  GENERAL: young F, toxic appearing  EYES: anicteric sclera, non injected conjunctiva, EOMI  PSYCH: no agitation, baseline mentation  NERVOUS SYSTEM:  Alert & Oriented X3, normal mentation, CN 2-12 grossly intact  PULMONARY: rhonchitic breath sounds b/l, no accessory muscle use  CARDIOVASCULAR: Regular rate and rhythm; No murmurs, rubs, or gallops  GI: Soft, Nontender, Nondistended; Bowel sounds present  EXTREMITIES:  2+ Peripheral Pulses, No clubbing, cyanosis, or edema  SKIN: No rashes or lesions    Consultant(s) Notes Reviewed:  [x ] YES  [ ] NO    Discussed with Consultants/Other Providers [ x] YES     LABS                          9.7    6.05  )-----------( 317      ( 31 Dec 2021 23:30 )             30.2         135  |  103  |  6<L>  ----------------------------<  251<H>  4.3   |  18  |  0.6<L>    Ca    8.5      31 Dec 2021 23:30  Mg     1.6         TPro  6.3  /  Alb  3.6  /  TBili  <0.2  /  DBili  x   /  AST  186<H>  /  ALT  287<H>  /  AlkPhos  151<H>        Urinalysis Basic - ( 2022 03:39 )    Color: Light Yellow / Appearance: Clear / S.018 / pH: x  Gluc: x / Ketone: Moderate  / Bili: Negative / Urobili: <2 mg/dL   Blood: x / Protein: Negative / Nitrite: Negative   Leuk Esterase: Negative / RBC: x / WBC x   Sq Epi: x / Non Sq Epi: x / Bacteria: x    POCT Blood Glucose.: 252 mg/dL (2022 11:25)    RADIOLOGY & ADDITIONAL TESTS:   Xray Chest 1 View- PORTABLE-Urgent (Xray Chest 1 View- PORTABLE-Urgent .) (21 @ 21:51) >  Impression:  No radiographic evidence of acute cardiopulmonary disease.      Imaging Personally Reviewed:  [ ] YES  [ ] NO    HEALTH ISSUES - PROBLEM Dx:    MEDICATIONS  (STANDING):  amitriptyline Oral Tab/Cap - Peds 75 milliGRAM(s) Oral at bedtime  ammonium lactate 12% Lotion 1 Application(s) Topical every 12 hours  aspirin  chewable 81 milliGRAM(s) Oral daily  chlorhexidine 4% Liquid 1 Application(s) Topical daily  dexAMETHasone     Tablet 6 milliGRAM(s) Oral daily  dextrose 40% Gel 15 Gram(s) Oral once  dextrose 5%. 1000 milliLiter(s) (100 mL/Hr) IV Continuous <Continuous>  dextrose 5%. 1000 milliLiter(s) (50 mL/Hr) IV Continuous <Continuous>  dextrose 50% Injectable 25 Gram(s) IV Push once  dextrose 50% Injectable 12.5 Gram(s) IV Push once  dextrose 50% Injectable 25 Gram(s) IV Push once  dextrose 50% Injectable 25 milliLiter(s) IV Push once  enoxaparin Injectable 40 milliGRAM(s) SubCutaneous daily  escitalopram 10 milliGRAM(s) Oral daily  escitalopram 5 milliGRAM(s) Oral daily  famotidine    Tablet 40 milliGRAM(s) Oral two times a day  glucagon  Injectable 1 milliGRAM(s) IntraMuscular once  insulin glargine Injectable (LANTUS) 35 Unit(s) SubCutaneous every morning  insulin glargine Injectable (LANTUS) 10 Unit(s) SubCutaneous at bedtime  insulin lispro (ADMELOG) corrective regimen sliding scale   SubCutaneous three times a day before meals  insulin lispro Injectable (ADMELOG) 10 Unit(s) SubCutaneous three times a day before meals  magnesium sulfate  IVPB 2 Gram(s) IV Intermittent once  metoprolol tartrate 25 milliGRAM(s) Oral two times a day  polyethylene glycol 3350 17 Gram(s) Oral daily  remdesivir  IVPB 200 milliGRAM(s) IV Intermittent once  senna 2 Tablet(s) Oral at bedtime  sodium chloride 0.9%. 1000 milliLiter(s) (150 mL/Hr) IV Continuous <Continuous>    MEDICATIONS  (PRN):  acetaminophen     Tablet .. 650 milliGRAM(s) Oral every 6 hours PRN Moderate Pain (4 - 6)  benzonatate 100 milliGRAM(s) Oral three times a day PRN Cough  bisacodyl Suppository 10 milliGRAM(s) Rectal daily PRN Constipation  cyclobenzaprine 10 milliGRAM(s) Oral three times a day PRN Muscle Spasm  guaifenesin/dextromethorphan Oral Liquid 10 milliLiter(s) Oral every 4 hours PRN Cough

## 2022-01-01 NOTE — CHART NOTE - NSCHARTNOTEFT_GEN_A_CORE
Pt febrile >102 F no cbc for 2 days no BMP for 1 day. Sky stat labs and cultures    #SIRS  - F/U CBC, Cultures, UA, Urine culture   - CXR without opacities  - F/U covid swab  - 1 time stat dose of meropenum as pt has many ABX allergies (please re-assess in the am)  - BP stable.

## 2022-01-01 NOTE — PROGRESS NOTE ADULT - ASSESSMENT
36-year-old, F, patient, PMHx Type I DM on an insulin pump, complicated by Diabetic neuropathy and vulvodynia HTN, chronic back pain, migraine headaches,  presents with lower quadrant abdominal pain, nausea and generalized weakness for 3 days in the setting of elevated sugars, elevated beta-hydroxybuturate, mild GAP, all concerning for DKA.    # COVID-19 PNA  - spike high grade fever overnight  - PCR positive  - started dexamethasone 6mg q24h(day 1 of 10)  - dose of remdesivir 200mg x1 today; holding further doses till ID consult  - ID consult pending  - guaifenesin/dextromethorphan Oral Liquid 10 milliLiter(s) Oral every 4 hours PRN Cough  - benzonatate 100 milliGRAM(s) Oral three times a day PRN Cough  - wean 02 as titrated    # Diabetic Ketoacidosis- resolved  - patient treated in the ICU for DKA, now resolved  - followed by Endocrinology, previously on insulin pump  - Per Dr. Hendricks, insulin pump will be available on tuesday, so the family are refusing dc and wants to wait till tuesday  has episode of hypoglycemia, ttt with dextrose and her lispro decreased     Anxiety Disorder   - patient with hx of anxiety- started on lexapro 5 mg per psychiatry  - continue to monitor   -required xanax 0.5 today     Sinus Tachycardia  - continue with metoprolol       Abdominal Pain   -CT Abdomen and Pelvis w/ IV Cont (12.02.21 @ 12:13)  No acute intra-abdominal or pelvic pathology is identified  - CLD Work up: completed, all labs collected   - follow up outpatient with GI For MRI of liver lesions and colonoscopy to r/o ibd       #Progress Note Handoff  Pending (specify):  Clinical improvement, ID consult  Family discussion: updated. in agreement of plan  Disposition: Unknown at this time________

## 2022-01-02 LAB
CULTURE RESULTS: SIGNIFICANT CHANGE UP
D DIMER BLD IA.RAPID-MCNC: 236 NG/ML DDU — HIGH (ref 0–230)
GLUCOSE BLDC GLUCOMTR-MCNC: 254 MG/DL — HIGH (ref 70–99)
GLUCOSE BLDC GLUCOMTR-MCNC: 290 MG/DL — HIGH (ref 70–99)
GLUCOSE BLDC GLUCOMTR-MCNC: 312 MG/DL — HIGH (ref 70–99)
GLUCOSE BLDC GLUCOMTR-MCNC: 320 MG/DL — HIGH (ref 70–99)
GLUCOSE BLDC GLUCOMTR-MCNC: 325 MG/DL — HIGH (ref 70–99)
GLUCOSE BLDC GLUCOMTR-MCNC: 405 MG/DL — HIGH (ref 70–99)
HCT VFR BLD CALC: 30.9 % — LOW (ref 37–47)
HCT VFR BLD CALC: 32.4 % — LOW (ref 37–47)
HGB BLD-MCNC: 10.4 G/DL — LOW (ref 12–16)
HGB BLD-MCNC: 9.6 G/DL — LOW (ref 12–16)
MAGNESIUM SERPL-MCNC: 1.9 MG/DL — SIGNIFICANT CHANGE UP (ref 1.8–2.4)
MCHC RBC-ENTMCNC: 26.4 PG — LOW (ref 27–31)
MCHC RBC-ENTMCNC: 26.8 PG — LOW (ref 27–31)
MCHC RBC-ENTMCNC: 31.1 G/DL — LOW (ref 32–37)
MCHC RBC-ENTMCNC: 32.1 G/DL — SIGNIFICANT CHANGE UP (ref 32–37)
MCV RBC AUTO: 83.5 FL — SIGNIFICANT CHANGE UP (ref 81–99)
MCV RBC AUTO: 85.1 FL — SIGNIFICANT CHANGE UP (ref 81–99)
NRBC # BLD: 0 /100 WBCS — SIGNIFICANT CHANGE UP (ref 0–0)
NRBC # BLD: 0 /100 WBCS — SIGNIFICANT CHANGE UP (ref 0–0)
PLATELET # BLD AUTO: 322 K/UL — SIGNIFICANT CHANGE UP (ref 130–400)
PLATELET # BLD AUTO: 400 K/UL — SIGNIFICANT CHANGE UP (ref 130–400)
RBC # BLD: 3.63 M/UL — LOW (ref 4.2–5.4)
RBC # BLD: 3.88 M/UL — LOW (ref 4.2–5.4)
RBC # FLD: 14.6 % — HIGH (ref 11.5–14.5)
RBC # FLD: 14.7 % — HIGH (ref 11.5–14.5)
SPECIMEN SOURCE: SIGNIFICANT CHANGE UP
WBC # BLD: 8.26 K/UL — SIGNIFICANT CHANGE UP (ref 4.8–10.8)
WBC # BLD: 8.74 K/UL — SIGNIFICANT CHANGE UP (ref 4.8–10.8)
WBC # FLD AUTO: 8.26 K/UL — SIGNIFICANT CHANGE UP (ref 4.8–10.8)
WBC # FLD AUTO: 8.74 K/UL — SIGNIFICANT CHANGE UP (ref 4.8–10.8)

## 2022-01-02 PROCEDURE — 99233 SBSQ HOSP IP/OBS HIGH 50: CPT

## 2022-01-02 RX ORDER — REMDESIVIR 5 MG/ML
100 INJECTION INTRAVENOUS ONCE
Refills: 0 | Status: DISCONTINUED | OUTPATIENT
Start: 2022-01-02 | End: 2022-01-02

## 2022-01-02 RX ADMIN — Medication 1 APPLICATION(S): at 05:44

## 2022-01-02 RX ADMIN — Medication 3: at 08:40

## 2022-01-02 RX ADMIN — INSULIN GLARGINE 10 UNIT(S): 100 INJECTION, SOLUTION SUBCUTANEOUS at 22:35

## 2022-01-02 RX ADMIN — Medication 10 UNIT(S): at 15:48

## 2022-01-02 RX ADMIN — ESCITALOPRAM OXALATE 5 MILLIGRAM(S): 10 TABLET, FILM COATED ORAL at 12:07

## 2022-01-02 RX ADMIN — Medication 25 MILLIGRAM(S): at 05:42

## 2022-01-02 RX ADMIN — Medication 81 MILLIGRAM(S): at 12:06

## 2022-01-02 RX ADMIN — ENOXAPARIN SODIUM 40 MILLIGRAM(S): 100 INJECTION SUBCUTANEOUS at 12:08

## 2022-01-02 RX ADMIN — FAMOTIDINE 40 MILLIGRAM(S): 10 INJECTION INTRAVENOUS at 05:42

## 2022-01-02 RX ADMIN — Medication 25 MILLIGRAM(S): at 17:17

## 2022-01-02 RX ADMIN — Medication 10 UNIT(S): at 08:41

## 2022-01-02 RX ADMIN — Medication 75 MILLIGRAM(S): at 22:41

## 2022-01-02 RX ADMIN — Medication 4: at 15:48

## 2022-01-02 RX ADMIN — INSULIN GLARGINE 35 UNIT(S): 100 INJECTION, SOLUTION SUBCUTANEOUS at 08:39

## 2022-01-02 RX ADMIN — ESCITALOPRAM OXALATE 10 MILLIGRAM(S): 10 TABLET, FILM COATED ORAL at 12:06

## 2022-01-02 RX ADMIN — Medication 10 UNIT(S): at 12:06

## 2022-01-02 RX ADMIN — SENNA PLUS 2 TABLET(S): 8.6 TABLET ORAL at 22:44

## 2022-01-02 RX ADMIN — FAMOTIDINE 40 MILLIGRAM(S): 10 INJECTION INTRAVENOUS at 17:16

## 2022-01-02 RX ADMIN — Medication 1 APPLICATION(S): at 17:12

## 2022-01-02 RX ADMIN — Medication 6 MILLIGRAM(S): at 05:42

## 2022-01-02 RX ADMIN — Medication 6: at 12:04

## 2022-01-02 NOTE — PROGRESS NOTE ADULT - ASSESSMENT
35 y/o F with PMHx Type I DM on an insulin pump, complicated by Diabetic neuropathy and vulvodynia, HTN, chronic back pain, migraine headaches,  presents with lower quadrant abdominal pain, nausea and generalized weakness for 3 days in the setting of elevated sugars, elevated beta-hydroxybuturate, mild GAP, all concerning for DKA.    # COVID-19 PNA  - PCR positive  - 1 episode of high grade fever  - on dexamethasone 6mg q24h (day 2 of 10)  - s/p remdesivir 200mg x1, awaiting further doses till ID consult  - ID consulted  - Robitussin 10mL Q4 PRN Cough  - Benzonatate 100mg Oral three times a day PRN Cough  - wean 02 as titrated    # Diabetic Ketoacidosis, resolved  - patient treated in the ICU for DKA, now resolved  - episode of hypoglycemia, tx w/ dextrose, lispro decreased  - followed by Endocrinology, previously on insulin pump  - Per Dr. Hendricks, insulin pump will be available on Tuesday, family refusing discharge until insulin pump delivered     # Anxiety Disorder   - patient with hx of anxiety- started on lexapro 5 mg per psychiatry   - requests xanax occasionally    # Sinus Tachycardia  - c/w metoprolol     # Abdominal Pain   -CT Abdomen and Pelvis w/ IV Cont 12/2/21:  No acute intra-abdominal or pelvic pathology is identified  - CLD Work up: completed, all labs collected   - follow up outpatient with GI For MRI of liver lesions and colonoscopy to r/o ibd     Misc:   # DVT ppx: lovenox 40mg subQ daily  # GI ppx: N/A  # Diet: DASH/TLC & CC  # Activity: AAT  # CODE: Full Code  # Dispo: acute    #Progress Note Handoff  Pending (specify):  Clinical improvement, ID consult

## 2022-01-02 NOTE — PROGRESS NOTE ADULT - SUBJECTIVE AND OBJECTIVE BOX
CRISTI MONTGOMERY 36y Female  MRN#: [id number]     SUBJECTIVE  Patient is a 36y old Female who presents with a chief complaint of Mild DKA (2022 14:44)    Interval/Overnight Events:    Today is hospital day 10d, and this morning she is lying in bed without distress.   No acute overnight events.     OBJECTIVE  PAST MEDICAL & SURGICAL HISTORY  Neuropathy  right lower extremity, vaginal    Type 1 diabetes    Degenerative disc disease, thoracic    Urinary tract infection, recurrent    Gastroesophageal reflux disease, esophagitis presence not specified    Intractable headache, unspecified chronicity pattern, unspecified headache type    Major depressive disorder with single episode, in full remission  previously treated with zyprexa, celexa and lexapro    Tremor of right hand    Tachycardia    Spinal stenosis    No significant past surgical history      ALLERGIES:  amoxicillin (Unknown)  Ceclor (Rash)  ciprofloxacin (Unknown)  clindamycin (Unknown)  flu vaccines (Other)  gabapentin (Unknown)  Influenza Virus Vaccine, H5N1, Inactivated (Unknown)  penicillins (Unknown)    MEDICATIONS:  STANDING MEDICATIONS  amitriptyline Oral Tab/Cap - Peds 75 milliGRAM(s) Oral at bedtime  ammonium lactate 12% Lotion 1 Application(s) Topical every 12 hours  aspirin  chewable 81 milliGRAM(s) Oral daily  chlorhexidine 4% Liquid 1 Application(s) Topical daily  dexAMETHasone     Tablet 6 milliGRAM(s) Oral daily  dextrose 40% Gel 15 Gram(s) Oral once  dextrose 5%. 1000 milliLiter(s) IV Continuous <Continuous>  dextrose 5%. 1000 milliLiter(s) IV Continuous <Continuous>  dextrose 50% Injectable 25 Gram(s) IV Push once  dextrose 50% Injectable 12.5 Gram(s) IV Push once  dextrose 50% Injectable 25 Gram(s) IV Push once  dextrose 50% Injectable 25 milliLiter(s) IV Push once  enoxaparin Injectable 40 milliGRAM(s) SubCutaneous daily  escitalopram 10 milliGRAM(s) Oral daily  escitalopram 5 milliGRAM(s) Oral daily  famotidine    Tablet 40 milliGRAM(s) Oral two times a day  glucagon  Injectable 1 milliGRAM(s) IntraMuscular once  insulin glargine Injectable (LANTUS) 35 Unit(s) SubCutaneous every morning  insulin glargine Injectable (LANTUS) 10 Unit(s) SubCutaneous at bedtime  insulin lispro (ADMELOG) corrective regimen sliding scale   SubCutaneous three times a day before meals  insulin lispro Injectable (ADMELOG) 10 Unit(s) SubCutaneous three times a day before meals  magnesium sulfate  IVPB 2 Gram(s) IV Intermittent once  metoprolol tartrate 25 milliGRAM(s) Oral two times a day  polyethylene glycol 3350 17 Gram(s) Oral daily  senna 2 Tablet(s) Oral at bedtime  sodium chloride 0.9%. 1000 milliLiter(s) IV Continuous <Continuous>    PRN MEDICATIONS  acetaminophen     Tablet .. 650 milliGRAM(s) Oral every 6 hours PRN  benzonatate 100 milliGRAM(s) Oral three times a day PRN  bisacodyl Suppository 10 milliGRAM(s) Rectal daily PRN  cyclobenzaprine 10 milliGRAM(s) Oral three times a day PRN  guaifenesin/dextromethorphan Oral Liquid 10 milliLiter(s) Oral every 4 hours PRN    HOME MEDICATIONS  Home Medications:  amitriptyline 75 mg oral tablet: 1 tab(s) orally once a day (at bedtime) (2021 15:06)  ammonium lactate 12% topical cream: Apply topically to affected area 2 times a day (23 Dec 2021 13:53)  aspirin 81 mg oral tablet, chewable: 1 tab(s) orally once a day (28 Dec 2021 09:29)  cyclobenzaprine 10 mg oral tablet: 1 tab(s) orally once a day (at bedtime) (28 Dec 2021 08:33)  metoprolol tartrate 25 mg oral tablet: 1 tab(s) orally 2 times a day (2021 15:06)  multivitamin: 1 tab(s) orally once a day (2021 15:06)      LABS:                        9.7    6.05  )-----------( 317      ( 31 Dec 2021 23:30 )             30.2         x   |  x   |  x   ----------------------------<  x   x    |  x   |  0.6<L>    Ca    8.5      31 Dec 2021 23:30  Mg     1.6         TPro  7.2  /  Alb  4.0  /  TBili  <0.2  /  DBili  <0.2  /  AST  310<H>  /  ALT  436<H>  /  AlkPhos  176<H>      LIVER FUNCTIONS - ( 2022 16:00 )  Alb: 4.0 g/dL / Pro: 7.2 g/dL / ALK PHOS: 176 U/L / ALT: 436 U/L / AST: 310 U/L / GGT: x           PT/INR - ( 2022 16:00 )   PT: 12.50 sec;   INR: 1.09 ratio           Urinalysis Basic - ( 2022 03:39 )    Color: Light Yellow / Appearance: Clear / S.018 / pH: x  Gluc: x / Ketone: Moderate  / Bili: Negative / Urobili: <2 mg/dL   Blood: x / Protein: Negative / Nitrite: Negative   Leuk Esterase: Negative / RBC: x / WBC x   Sq Epi: x / Non Sq Epi: x / Bacteria: x                CAPILLARY BLOOD GLUCOSE      POCT Blood Glucose.: 151 mg/dL (2022 22:04)      PHYSICAL EXAM:  T(C): 36.7 (22 @ 15:00), Max: 38.6 (22 @ 03:00)  HR: 102 (22 @ 15:00) (94 - 108)  BP: 136/74 (22 @ 15:00) (122/59 - 136/74)  RR: 19 (22 @ 15:00) (19 - 20)  SpO2: 100% (22 @ 15:00) (97% - 100%)    GENERAL: NAD, well-developed, 36y  EENT: EOMI, conjunctiva and sclera clear, No nasal obstruction or discharge  RESPIRATORY: Clear to auscultation bilaterally; No wheeze or crackles  CARDIOVASCULAR: Regular rate and rhythm; No murmurs, rubs, or gallops, no pitting edema  GASTROINTESTINAL: Abdomen Soft, Nontender, Nondistended  MUSCULOSKELETAL:  No cyanosis, extremities grossly symmetrical  PSYCH: AAOx3, affect appropriate  NEUROLOGY: non-focal, cognition grossly intact, MAEE    ADMISSION SUMMARY  Patient is a 36y old Female who presents with a chief complaint of Mild DKA (2022 14:44)

## 2022-01-02 NOTE — CONSULT NOTE ADULT - SUBJECTIVE AND OBJECTIVE BOX
CRISTI MONTGOMERY  36y, Female  Allergy: amoxicillin (Unknown)  Ceclor (Rash)  ciprofloxacin (Unknown)  clindamycin (Unknown)  flu vaccines (Other)  gabapentin (Unknown)  Influenza Virus Vaccine, H5N1, Inactivated (Unknown)  penicillins (Unknown)      All historical available data reviewed.    HPI:  36-year-old, F, patient, PMHx: Type I DM ( since the age of 16 months) on an insulin pump complicated by Diabetic neuropathy , HTN, chronic back pain, migraine headaches,  presents with lower quadrant abdominal pain, nausea and generalized weakness for 3 days. The patient was seen yesterday for the same compliant and d/c after unremarkable workup from the ED. Today the patient represents to the hospital still complaining of lower quadrant abdominal pain. No Nausea, no vomiting. She denies any diarrhea, but reports that a few weeks ago she was having episodes of bloody diarrhea, and was given a diagnosis of colitis ( no further work up was done). The patient reports urinary frequency. No urgency or dysuria.  (23 Dec 2021 13:20)   COVID-19 NG   COVID-19 positive    FAMILY HISTORY:    PAST MEDICAL & SURGICAL HISTORY:  Neuropathy  right lower extremity, vaginal    Type 1 diabetes    Degenerative disc disease, thoracic    Urinary tract infection, recurrent    Gastroesophageal reflux disease, esophagitis presence not specified    Intractable headache, unspecified chronicity pattern, unspecified headache type    Major depressive disorder with single episode, in full remission  previously treated with zyprexa, celexa and lexapro    Tremor of right hand    Tachycardia    Spinal stenosis    No significant past surgical history          VITALS:  T(F): 97, Max: 99.1 (22 @ 00:00)  HR: 91  BP: 122/57  RR: 18Vital Signs Last 24 Hrs  T(C): 36.1 (2022 05:29), Max: 37.3 (2022 00:00)  T(F): 97 (2022 05:29), Max: 99.1 (2022 00:00)  HR: 91 (2022 05:29) (91 - 102)  BP: 122/57 (2022 05:29) (112/66 - 136/74)  BP(mean): --  RR: 18 (2022 05:29) (18 - 19)  SpO2: 98% (2022 05:29) (98% - 100%)    TESTS & MEASUREMENTS:                        9.6    8.74  )-----------( 322      ( 2022 07:10 )             30.9         x   |  x   |  x   ----------------------------<  x   x    |  x   |  0.6<L>    Ca    8.5      31 Dec 2021 23:30  Mg     1.9         TPro  7.2  /  Alb  4.0  /  TBili  <0.2  /  DBili  <0.2  /  AST  310<H>  /  ALT  436<H>  /  AlkPhos  176<H>      LIVER FUNCTIONS - ( 2022 16:00 )  Alb: 4.0 g/dL / Pro: 7.2 g/dL / ALK PHOS: 176 U/L / ALT: 436 U/L / AST: 310 U/L / GGT: x             Culture - Urine (collected 22 @ 03:38)  Source: Clean Catch Clean Catch (Midstream)  Final Report (22 @ 12:01):    <10,000 CFU/mL Normal Urogenital Yadira    Culture - Blood (collected 21 @ 23:30)  Source: .Blood Blood  Preliminary Report (22 @ 13:01):    No growth to date.    Culture - Blood (collected 21 @ 22:44)  Source: .Blood Blood  Preliminary Report (22 @ 09:01):    No growth to date.      Urinalysis Basic - ( 2022 03:39 )    Color: Light Yellow / Appearance: Clear / S.018 / pH: x  Gluc: x / Ketone: Moderate  / Bili: Negative / Urobili: <2 mg/dL   Blood: x / Protein: Negative / Nitrite: Negative   Leuk Esterase: Negative / RBC: x / WBC x   Sq Epi: x / Non Sq Epi: x / Bacteria: x          RADIOLOGY & ADDITIONAL TESTS:  Personal review of radiological diagnostics performed  Echo and EKG results noted when applicable.     MEDICATIONS:  acetaminophen     Tablet .. 650 milliGRAM(s) Oral every 6 hours PRN  amitriptyline Oral Tab/Cap - Peds 75 milliGRAM(s) Oral at bedtime  ammonium lactate 12% Lotion 1 Application(s) Topical every 12 hours  aspirin  chewable 81 milliGRAM(s) Oral daily  benzonatate 100 milliGRAM(s) Oral three times a day PRN  bisacodyl Suppository 10 milliGRAM(s) Rectal daily PRN  chlorhexidine 4% Liquid 1 Application(s) Topical daily  cyclobenzaprine 10 milliGRAM(s) Oral three times a day PRN  dexAMETHasone     Tablet 6 milliGRAM(s) Oral daily  dextrose 40% Gel 15 Gram(s) Oral once  dextrose 5%. 1000 milliLiter(s) IV Continuous <Continuous>  dextrose 5%. 1000 milliLiter(s) IV Continuous <Continuous>  dextrose 50% Injectable 25 Gram(s) IV Push once  dextrose 50% Injectable 12.5 Gram(s) IV Push once  dextrose 50% Injectable 25 Gram(s) IV Push once  dextrose 50% Injectable 25 milliLiter(s) IV Push once  enoxaparin Injectable 40 milliGRAM(s) SubCutaneous daily  escitalopram 10 milliGRAM(s) Oral daily  escitalopram 5 milliGRAM(s) Oral daily  famotidine    Tablet 40 milliGRAM(s) Oral two times a day  glucagon  Injectable 1 milliGRAM(s) IntraMuscular once  guaifenesin/dextromethorphan Oral Liquid 10 milliLiter(s) Oral every 4 hours PRN  insulin glargine Injectable (LANTUS) 35 Unit(s) SubCutaneous every morning  insulin glargine Injectable (LANTUS) 10 Unit(s) SubCutaneous at bedtime  insulin lispro (ADMELOG) corrective regimen sliding scale   SubCutaneous three times a day before meals  insulin lispro Injectable (ADMELOG) 10 Unit(s) SubCutaneous three times a day before meals  magnesium sulfate  IVPB 2 Gram(s) IV Intermittent once  metoprolol tartrate 25 milliGRAM(s) Oral two times a day  polyethylene glycol 3350 17 Gram(s) Oral daily  senna 2 Tablet(s) Oral at bedtime  sodium chloride 0.9%. 1000 milliLiter(s) IV Continuous <Continuous>      ANTIBIOTICS:

## 2022-01-02 NOTE — CONSULT NOTE ADULT - ASSESSMENT
37 y/o F with PMHx Type I DM on an insulin pump, complicated by Diabetic neuropathy and vulvodynia, HTN, chronic back pain, migraine headaches,  presents with lower quadrant abdominal pain, nausea and generalized weakness for 3 days in the setting of elevated sugars, elevated beta-hydroxybuturate, mild GAP, all concerning for DKA.    IMPRESSION;  COVID with Moderate illness : clinically has  lower respiratory disease ( SOB/cough ) and abnormal CXR and a SaO2>93% on supplemental O2  12/28 COVID-19 NG  12/31 COVID-19 positive  Pt is in the early viral replicative phase based on the timeline/onset of symptoms.  CXR no GGO  Transaminitis : increasing. Clinically no cholangitis > but will r/o  WBC 8.7    RECOMMENDATIONS;  D/c RDV/steroids  BCx  RUQ sono  Monitor LFTs

## 2022-01-03 LAB
ALBUMIN SERPL ELPH-MCNC: 3.8 G/DL — SIGNIFICANT CHANGE UP (ref 3.5–5.2)
ALBUMIN SERPL ELPH-MCNC: 4.2 G/DL — SIGNIFICANT CHANGE UP (ref 3.5–5.2)
ALP SERPL-CCNC: 151 U/L — HIGH (ref 30–115)
ALP SERPL-CCNC: 165 U/L — HIGH (ref 30–115)
ALT FLD-CCNC: 281 U/L — HIGH (ref 0–41)
ALT FLD-CCNC: 331 U/L — HIGH (ref 0–41)
ANION GAP SERPL CALC-SCNC: 17 MMOL/L — HIGH (ref 7–14)
ANION GAP SERPL CALC-SCNC: 20 MMOL/L — HIGH (ref 7–14)
AST SERPL-CCNC: 103 U/L — HIGH (ref 0–41)
AST SERPL-CCNC: 125 U/L — HIGH (ref 0–41)
BASOPHILS # BLD AUTO: 0.04 K/UL — SIGNIFICANT CHANGE UP (ref 0–0.2)
BASOPHILS NFR BLD AUTO: 0.6 % — SIGNIFICANT CHANGE UP (ref 0–1)
BILIRUB SERPL-MCNC: <0.2 MG/DL — SIGNIFICANT CHANGE UP (ref 0.2–1.2)
BILIRUB SERPL-MCNC: <0.2 MG/DL — SIGNIFICANT CHANGE UP (ref 0.2–1.2)
BUN SERPL-MCNC: 10 MG/DL — SIGNIFICANT CHANGE UP (ref 10–20)
BUN SERPL-MCNC: 9 MG/DL — LOW (ref 10–20)
CALCIUM SERPL-MCNC: 8.7 MG/DL — SIGNIFICANT CHANGE UP (ref 8.5–10.1)
CALCIUM SERPL-MCNC: 8.8 MG/DL — SIGNIFICANT CHANGE UP (ref 8.5–10.1)
CHLORIDE SERPL-SCNC: 102 MMOL/L — SIGNIFICANT CHANGE UP (ref 98–110)
CHLORIDE SERPL-SCNC: 98 MMOL/L — SIGNIFICANT CHANGE UP (ref 98–110)
CO2 SERPL-SCNC: 19 MMOL/L — SIGNIFICANT CHANGE UP (ref 17–32)
CO2 SERPL-SCNC: 20 MMOL/L — SIGNIFICANT CHANGE UP (ref 17–32)
CREAT SERPL-MCNC: 0.6 MG/DL — LOW (ref 0.7–1.5)
CREAT SERPL-MCNC: 0.6 MG/DL — LOW (ref 0.7–1.5)
EOSINOPHIL # BLD AUTO: 0.01 K/UL — SIGNIFICANT CHANGE UP (ref 0–0.7)
EOSINOPHIL NFR BLD AUTO: 0.1 % — SIGNIFICANT CHANGE UP (ref 0–8)
GLUCOSE BLDC GLUCOMTR-MCNC: 195 MG/DL — HIGH (ref 70–99)
GLUCOSE BLDC GLUCOMTR-MCNC: 255 MG/DL — HIGH (ref 70–99)
GLUCOSE BLDC GLUCOMTR-MCNC: 279 MG/DL — HIGH (ref 70–99)
GLUCOSE BLDC GLUCOMTR-MCNC: 294 MG/DL — HIGH (ref 70–99)
GLUCOSE SERPL-MCNC: 200 MG/DL — HIGH (ref 70–99)
GLUCOSE SERPL-MCNC: 273 MG/DL — HIGH (ref 70–99)
HCT VFR BLD CALC: 34 % — LOW (ref 37–47)
HGB BLD-MCNC: 10.6 G/DL — LOW (ref 12–16)
IMM GRANULOCYTES NFR BLD AUTO: 0.3 % — SIGNIFICANT CHANGE UP (ref 0.1–0.3)
LYMPHOCYTES # BLD AUTO: 3.31 K/UL — SIGNIFICANT CHANGE UP (ref 1.2–3.4)
LYMPHOCYTES # BLD AUTO: 49.4 % — SIGNIFICANT CHANGE UP (ref 20.5–51.1)
MAGNESIUM SERPL-MCNC: 1.6 MG/DL — LOW (ref 1.8–2.4)
MCHC RBC-ENTMCNC: 26.4 PG — LOW (ref 27–31)
MCHC RBC-ENTMCNC: 31.2 G/DL — LOW (ref 32–37)
MCV RBC AUTO: 84.8 FL — SIGNIFICANT CHANGE UP (ref 81–99)
MONOCYTES # BLD AUTO: 0.52 K/UL — SIGNIFICANT CHANGE UP (ref 0.1–0.6)
MONOCYTES NFR BLD AUTO: 7.8 % — SIGNIFICANT CHANGE UP (ref 1.7–9.3)
NEUTROPHILS # BLD AUTO: 2.8 K/UL — SIGNIFICANT CHANGE UP (ref 1.4–6.5)
NEUTROPHILS NFR BLD AUTO: 41.8 % — LOW (ref 42.2–75.2)
NRBC # BLD: 0 /100 WBCS — SIGNIFICANT CHANGE UP (ref 0–0)
PLATELET # BLD AUTO: 364 K/UL — SIGNIFICANT CHANGE UP (ref 130–400)
POTASSIUM SERPL-MCNC: 4 MMOL/L — SIGNIFICANT CHANGE UP (ref 3.5–5)
POTASSIUM SERPL-MCNC: 4.2 MMOL/L — SIGNIFICANT CHANGE UP (ref 3.5–5)
POTASSIUM SERPL-SCNC: 4 MMOL/L — SIGNIFICANT CHANGE UP (ref 3.5–5)
POTASSIUM SERPL-SCNC: 4.2 MMOL/L — SIGNIFICANT CHANGE UP (ref 3.5–5)
PROT SERPL-MCNC: 6.9 G/DL — SIGNIFICANT CHANGE UP (ref 6–8)
PROT SERPL-MCNC: 7.5 G/DL — SIGNIFICANT CHANGE UP (ref 6–8)
RBC # BLD: 4.01 M/UL — LOW (ref 4.2–5.4)
RBC # FLD: 14.6 % — HIGH (ref 11.5–14.5)
SODIUM SERPL-SCNC: 134 MMOL/L — LOW (ref 135–146)
SODIUM SERPL-SCNC: 142 MMOL/L — SIGNIFICANT CHANGE UP (ref 135–146)
WBC # BLD: 6.7 K/UL — SIGNIFICANT CHANGE UP (ref 4.8–10.8)
WBC # FLD AUTO: 6.7 K/UL — SIGNIFICANT CHANGE UP (ref 4.8–10.8)

## 2022-01-03 PROCEDURE — 99233 SBSQ HOSP IP/OBS HIGH 50: CPT

## 2022-01-03 PROCEDURE — 76705 ECHO EXAM OF ABDOMEN: CPT | Mod: 26

## 2022-01-03 RX ORDER — INSULIN LISPRO 100/ML
16 VIAL (ML) SUBCUTANEOUS ONCE
Refills: 0 | Status: DISCONTINUED | OUTPATIENT
Start: 2022-01-03 | End: 2022-01-04

## 2022-01-03 RX ORDER — SODIUM CHLORIDE 9 MG/ML
1000 INJECTION INTRAMUSCULAR; INTRAVENOUS; SUBCUTANEOUS
Refills: 0 | Status: DISCONTINUED | OUTPATIENT
Start: 2022-01-03 | End: 2022-01-07

## 2022-01-03 RX ORDER — INSULIN GLARGINE 100 [IU]/ML
40 INJECTION, SOLUTION SUBCUTANEOUS EVERY MORNING
Refills: 0 | Status: DISCONTINUED | OUTPATIENT
Start: 2022-01-04 | End: 2022-01-11

## 2022-01-03 RX ORDER — INSULIN LISPRO 100/ML
13 VIAL (ML) SUBCUTANEOUS
Refills: 0 | Status: DISCONTINUED | OUTPATIENT
Start: 2022-01-03 | End: 2022-01-10

## 2022-01-03 RX ORDER — MAGNESIUM SULFATE 500 MG/ML
2 VIAL (ML) INJECTION ONCE
Refills: 0 | Status: COMPLETED | OUTPATIENT
Start: 2022-01-03 | End: 2022-01-03

## 2022-01-03 RX ORDER — INSULIN GLARGINE 100 [IU]/ML
5 INJECTION, SOLUTION SUBCUTANEOUS ONCE
Refills: 0 | Status: COMPLETED | OUTPATIENT
Start: 2022-01-03 | End: 2022-01-03

## 2022-01-03 RX ORDER — INSULIN LISPRO 100/ML
5 VIAL (ML) SUBCUTANEOUS ONCE
Refills: 0 | Status: COMPLETED | OUTPATIENT
Start: 2022-01-03 | End: 2022-01-03

## 2022-01-03 RX ORDER — INSULIN GLARGINE 100 [IU]/ML
15 INJECTION, SOLUTION SUBCUTANEOUS AT BEDTIME
Refills: 0 | Status: DISCONTINUED | OUTPATIENT
Start: 2022-01-03 | End: 2022-01-04

## 2022-01-03 RX ADMIN — Medication 75 MILLIGRAM(S): at 21:55

## 2022-01-03 RX ADMIN — Medication 81 MILLIGRAM(S): at 11:22

## 2022-01-03 RX ADMIN — Medication 3: at 11:18

## 2022-01-03 RX ADMIN — Medication 5 UNIT(S): at 15:32

## 2022-01-03 RX ADMIN — INSULIN GLARGINE 35 UNIT(S): 100 INJECTION, SOLUTION SUBCUTANEOUS at 08:16

## 2022-01-03 RX ADMIN — FAMOTIDINE 40 MILLIGRAM(S): 10 INJECTION INTRAVENOUS at 16:52

## 2022-01-03 RX ADMIN — Medication 30 MILLILITER(S): at 16:04

## 2022-01-03 RX ADMIN — CYCLOBENZAPRINE HYDROCHLORIDE 10 MILLIGRAM(S): 10 TABLET, FILM COATED ORAL at 00:04

## 2022-01-03 RX ADMIN — Medication 25 GRAM(S): at 08:16

## 2022-01-03 RX ADMIN — Medication 3: at 08:29

## 2022-01-03 RX ADMIN — SODIUM CHLORIDE 100 MILLILITER(S): 9 INJECTION INTRAMUSCULAR; INTRAVENOUS; SUBCUTANEOUS at 15:35

## 2022-01-03 RX ADMIN — Medication 25 MILLIGRAM(S): at 05:36

## 2022-01-03 RX ADMIN — SENNA PLUS 2 TABLET(S): 8.6 TABLET ORAL at 21:55

## 2022-01-03 RX ADMIN — INSULIN GLARGINE 15 UNIT(S): 100 INJECTION, SOLUTION SUBCUTANEOUS at 21:55

## 2022-01-03 RX ADMIN — CHLORHEXIDINE GLUCONATE 1 APPLICATION(S): 213 SOLUTION TOPICAL at 11:18

## 2022-01-03 RX ADMIN — Medication 1 APPLICATION(S): at 16:50

## 2022-01-03 RX ADMIN — INSULIN GLARGINE 5 UNIT(S): 100 INJECTION, SOLUTION SUBCUTANEOUS at 16:49

## 2022-01-03 RX ADMIN — FAMOTIDINE 40 MILLIGRAM(S): 10 INJECTION INTRAVENOUS at 05:36

## 2022-01-03 RX ADMIN — ENOXAPARIN SODIUM 40 MILLIGRAM(S): 100 INJECTION SUBCUTANEOUS at 11:19

## 2022-01-03 RX ADMIN — Medication 10 UNIT(S): at 08:29

## 2022-01-03 RX ADMIN — ESCITALOPRAM OXALATE 5 MILLIGRAM(S): 10 TABLET, FILM COATED ORAL at 11:19

## 2022-01-03 RX ADMIN — Medication 3: at 16:50

## 2022-01-03 RX ADMIN — SODIUM CHLORIDE 150 MILLILITER(S): 9 INJECTION INTRAMUSCULAR; INTRAVENOUS; SUBCUTANEOUS at 05:36

## 2022-01-03 RX ADMIN — Medication 13 UNIT(S): at 16:51

## 2022-01-03 RX ADMIN — ESCITALOPRAM OXALATE 10 MILLIGRAM(S): 10 TABLET, FILM COATED ORAL at 11:22

## 2022-01-03 RX ADMIN — Medication 1 APPLICATION(S): at 08:16

## 2022-01-03 RX ADMIN — POLYETHYLENE GLYCOL 3350 17 GRAM(S): 17 POWDER, FOR SOLUTION ORAL at 11:20

## 2022-01-03 RX ADMIN — Medication 10 UNIT(S): at 11:18

## 2022-01-03 RX ADMIN — Medication 25 MILLIGRAM(S): at 16:51

## 2022-01-03 NOTE — PROGRESS NOTE ADULT - ASSESSMENT
37 y/o F with PMHx Type I DM on an insulin pump, complicated by Diabetic neuropathy and vulvodynia, HTN, chronic back pain, migraine headaches,  presents with lower quadrant abdominal pain, nausea and generalized weakness for 3 days in the setting of elevated sugars, elevated beta-hydroxybuturate, mild GAP, all concerning for DKA.    # COVID-19 PNA  - PCR positive  - 1 episode of high grade fever  - on dexamethasone 6mg q24h (day 2 of 10)  - s/p remdesivir 200mg x1, awaiting further doses till ID consult  - ID consulted  - Robitussin 10mL Q4 PRN Cough  - Benzonatate 100mg Oral three times a day PRN Cough  - wean 02 as titrated    # Abdominal Pain   # Uptrending LFTs  -CT Abdomen and Pelvis w/ IV Cont 12/2/21:  No acute intra-abdominal or pelvic pathology is identified  - CLD Work up: completed, all labs collected   - follow up outpatient with GI For MRI of liver lesions and colonoscopy to r/o ibd     # Diabetic Ketoacidosis, resolved  - patient treated in the ICU for DKA, now resolved  - episode of hypoglycemia, tx w/ dextrose, lispro decreased  - followed by Endocrinology, previously on insulin pump  - Per Dr. Hendricks, insulin pump will be available on Tuesday, family refusing discharge until insulin pump delivered     # Anxiety Disorder   - patient with hx of anxiety- started on lexapro 5 mg per psychiatry   - requests xanax occasionally    # Sinus Tachycardia  - c/w metoprolol     # Abdominal Pain   # Uptrending LFTs  -CT Abdomen and Pelvis w/ IV Cont 12/2/21:  No acute intra-abdominal or pelvic pathology is identified  - CLD Work up: completed, all labs collected   - follow up outpatient with GI For MRI of liver lesions and colonoscopy to r/o ibd     Misc:   # DVT ppx: lovenox 40mg subQ daily  # GI ppx: N/A  # Diet: DASH/TLC & CC  # Activity: AAT  # CODE: Full Code  # Dispo: acute    #Progress Note Handoff  Pending (specify): trend LFTs, f/u RUQ US   35 y/o F with PMHx Type I DM on an insulin pump, complicated by Diabetic neuropathy and vulvodynia, HTN, chronic back pain, migraine headaches,  presents with lower quadrant abdominal pain, nausea and generalized weakness for 3 days in the setting of elevated sugars, elevated beta-hydroxybuturate, mild GAP, all concerning for DKA.    # COVID-19 PNA  - PCR positive  - 1 episode of high grade fever  - on dexamethasone 6mg q24h (day 2 of 10)  - s/p remdesivir 200mg x1  - ID consulted  - course complicated by rising LFTs, AST/ALT: 310/436  - stopped RDV, steroids as per ID. LFTs trending down  - c/w Robitussin 10mL Q4 PRN Cough  - c/w Benzonatate 100mg Oral three times a day PRN Cough  - wean 02 as titrated    # Abdominal Pain   # Uptrending LFTs  -CT Abdomen and Pelvis w/ IV Cont 12/2/21:  No acute intra-abdominal or pelvic pathology is identified  - CLD Work up: completed, all labs collected   - LFTs uptrending, stopped RDV, steroids as per ID  - obtained blood culture  - RUQ US orderd  - monitor LFTs  - follow up outpatient with GI For MRI of liver lesions and colonoscopy to r/o ibd     # Diabetic Ketoacidosis, resolved  - patient treated in the ICU for DKA, now resolved  - episode of hypoglycemia, tx w/ dextrose, lispro decreased  - followed by Endocrinology, previously on insulin pump  - Per Dr. Hendricks, insulin pump will be available on Tuesday, family refusing discharge until insulin pump delivered     # Anxiety Disorder   - patient with hx of anxiety- started on lexapro 5 mg per psychiatry   - requests xanax occasionally    # Sinus Tachycardia  - c/w metoprolol     # Abdominal Pain   # Uptrending LFTs  -CT Abdomen and Pelvis w/ IV Cont 12/2/21:  No acute intra-abdominal or pelvic pathology is identified  - CLD Work up: completed, all labs collected   - follow up outpatient with GI For MRI of liver lesions and colonoscopy to r/o ibd     Misc:   # DVT ppx: lovenox 40mg subQ daily  # GI ppx: N/A  # Diet: DASH/TLC & CC  # Activity: AAT  # CODE: Full Code  # Dispo: acute    #Progress Note Handoff  Pending (specify): trend LFTs, f/u RUQ US   35 y/o F with PMHx Type I DM on an insulin pump, complicated by Diabetic neuropathy and vulvodynia, HTN, chronic back pain, migraine headaches,  presents with lower quadrant abdominal pain, nausea and generalized weakness for 3 days in the setting of elevated sugars, elevated beta-hydroxybuturate, mild GAP, all concerning for DKA, now resolved, was waiting for d/c upon arrival of new insulin pump but became COVID + while waiting     # COVID-19 PNA  - PCR positive  - 1 episode of high grade fever  - on dexamethasone 6mg q24h (day 2 of 10)  - s/p remdesivir 200mg x1  - ID consulted  - course complicated by rising LFTs, AST/ALT: 310/436  - stopped RDV, steroids as per ID. LFTs trending down  - c/w Robitussin 10mL Q4 PRN Cough  - c/w Benzonatate 100mg Oral three times a day PRN Cough  - wean 02 as titrated    # Abdominal Pain   # Uptrending LFTs  -CT Abdomen and Pelvis w/ IV Cont 12/2/21:  No acute intra-abdominal or pelvic pathology is identified  - CLD Work up: completed, all labs collected   - LFTs uptrending, stopped RDV, steroids as per ID  - obtained blood culture  - RUQ US orderd  - monitor LFTs  - follow up outpatient with GI For MRI of liver lesions and colonoscopy to r/o ibd     # Diabetic Ketoacidosis, resolved  - patient treated in the ICU for DKA, now resolved  - episode of hypoglycemia, tx w/ dextrose, lispro decreased  - followed by Endocrinology, previously on insulin pump  - Per Dr. Hendricks, insulin pump will be available on Tuesday, family refusing discharge until insulin pump delivered     # Anxiety Disorder   - patient with hx of anxiety- started on lexapro 5 mg per psychiatry   - requests xanax occasionally    # Sinus Tachycardia  - c/w metoprolol     # Abdominal Pain   # Uptrending LFTs  -CT Abdomen and Pelvis w/ IV Cont 12/2/21:  No acute intra-abdominal or pelvic pathology is identified  - CLD Work up: completed, all labs collected   - follow up outpatient with GI For MRI of liver lesions and colonoscopy to r/o ibd     Misc:   # DVT ppx: lovenox 40mg subQ daily  # GI ppx: N/A  # Diet: DASH/TLC & CC  # Activity: AAT  # CODE: Full Code  # Dispo: acute    #Progress Note Handoff  Pending (specify): trend LFTs, f/u RUQ US

## 2022-01-03 NOTE — PROGRESS NOTE ADULT - SUBJECTIVE AND OBJECTIVE BOX
CRISTI MONTGOMERY 36y Female  MRN#: [id number]     SUBJECTIVE  Patient is a 36y old Female who presents with a chief complaint of Mild DKA (02 Jan 2022 14:19)    Interval/Overnight Events:    Today is hospital day 11d, and this morning she is lying in bed without distress. Patient endorsed having 1 episode of bowel movement with blood.  No acute overnight events.     OBJECTIVE  PAST MEDICAL & SURGICAL HISTORY  Neuropathy  right lower extremity, vaginal    Type 1 diabetes    Degenerative disc disease, thoracic    Urinary tract infection, recurrent    Gastroesophageal reflux disease, esophagitis presence not specified    Intractable headache, unspecified chronicity pattern, unspecified headache type    Major depressive disorder with single episode, in full remission  previously treated with zyprexa, celexa and lexapro    Tremor of right hand    Tachycardia    Spinal stenosis    No significant past surgical history      ALLERGIES:  amoxicillin (Unknown)  Ceclor (Rash)  ciprofloxacin (Unknown)  clindamycin (Unknown)  flu vaccines (Other)  gabapentin (Unknown)  Influenza Virus Vaccine, H5N1, Inactivated (Unknown)  penicillins (Unknown)    MEDICATIONS:  STANDING MEDICATIONS  amitriptyline Oral Tab/Cap - Peds 75 milliGRAM(s) Oral at bedtime  ammonium lactate 12% Lotion 1 Application(s) Topical every 12 hours  aspirin  chewable 81 milliGRAM(s) Oral daily  chlorhexidine 4% Liquid 1 Application(s) Topical daily  dextrose 40% Gel 15 Gram(s) Oral once  dextrose 5%. 1000 milliLiter(s) IV Continuous <Continuous>  dextrose 5%. 1000 milliLiter(s) IV Continuous <Continuous>  dextrose 50% Injectable 25 Gram(s) IV Push once  dextrose 50% Injectable 12.5 Gram(s) IV Push once  dextrose 50% Injectable 25 Gram(s) IV Push once  dextrose 50% Injectable 25 milliLiter(s) IV Push once  enoxaparin Injectable 40 milliGRAM(s) SubCutaneous daily  escitalopram 10 milliGRAM(s) Oral daily  escitalopram 5 milliGRAM(s) Oral daily  famotidine    Tablet 40 milliGRAM(s) Oral two times a day  glucagon  Injectable 1 milliGRAM(s) IntraMuscular once  insulin glargine Injectable (LANTUS) 35 Unit(s) SubCutaneous every morning  insulin glargine Injectable (LANTUS) 10 Unit(s) SubCutaneous at bedtime  insulin lispro (ADMELOG) corrective regimen sliding scale   SubCutaneous three times a day before meals  insulin lispro Injectable (ADMELOG) 10 Unit(s) SubCutaneous three times a day before meals  metoprolol tartrate 25 milliGRAM(s) Oral two times a day  polyethylene glycol 3350 17 Gram(s) Oral daily  senna 2 Tablet(s) Oral at bedtime  sodium chloride 0.9%. 1000 milliLiter(s) IV Continuous <Continuous>    PRN MEDICATIONS  acetaminophen     Tablet .. 650 milliGRAM(s) Oral every 6 hours PRN  benzonatate 100 milliGRAM(s) Oral three times a day PRN  bisacodyl Suppository 10 milliGRAM(s) Rectal daily PRN  cyclobenzaprine 10 milliGRAM(s) Oral three times a day PRN  guaifenesin/dextromethorphan Oral Liquid 10 milliLiter(s) Oral every 4 hours PRN    HOME MEDICATIONS  Home Medications:  amitriptyline 75 mg oral tablet: 1 tab(s) orally once a day (at bedtime) (04 Nov 2021 15:06)  ammonium lactate 12% topical cream: Apply topically to affected area 2 times a day (23 Dec 2021 13:53)  aspirin 81 mg oral tablet, chewable: 1 tab(s) orally once a day (28 Dec 2021 09:29)  cyclobenzaprine 10 mg oral tablet: 1 tab(s) orally once a day (at bedtime) (28 Dec 2021 08:33)  metoprolol tartrate 25 mg oral tablet: 1 tab(s) orally 2 times a day (04 Nov 2021 15:06)  multivitamin: 1 tab(s) orally once a day (04 Nov 2021 15:06)      LABS:                        10.4   8.26  )-----------( 400      ( 02 Jan 2022 13:20 )             32.4     01-03    142  |  102  |  10  ----------------------------<  273<H>  4.2   |  20  |  0.6<L>    Ca    8.8      03 Jan 2022 04:30  Mg     1.9     01-02    TPro  7.5  /  Alb  4.2  /  TBili  <0.2  /  DBili  x   /  AST  125<H>  /  ALT  331<H>  /  AlkPhos  151<H>  01-03    LIVER FUNCTIONS - ( 03 Jan 2022 04:30 )  Alb: 4.2 g/dL / Pro: 7.5 g/dL / ALK PHOS: 151 U/L / ALT: 331 U/L / AST: 125 U/L / GGT: x           PT/INR - ( 01 Jan 2022 16:00 )   PT: 12.50 sec;   INR: 1.09 ratio                   Culture - Urine (collected 01 Jan 2022 03:38)  Source: Clean Catch Clean Catch (Midstream)  Final Report (02 Jan 2022 12:01):    <10,000 CFU/mL Normal Urogenital Yadira    Culture - Blood (collected 31 Dec 2021 23:30)  Source: .Blood Blood  Preliminary Report (02 Jan 2022 13:01):    No growth to date.    Culture - Blood (collected 31 Dec 2021 22:44)  Source: .Blood Blood  Preliminary Report (02 Jan 2022 09:01):    No growth to date.          CAPILLARY BLOOD GLUCOSE      POCT Blood Glucose.: 255 mg/dL (03 Jan 2022 08:19)      PHYSICAL EXAM:  T(C): 35.7 (01-03-22 @ 07:00), Max: 36.7 (01-03-22 @ 01:00)  HR: 96 (01-03-22 @ 07:00) (96 - 103)  BP: 120/65 (01-03-22 @ 07:00) (120/65 - 138/73)  RR: 19 (01-03-22 @ 07:00) (18 - 19)  SpO2: 100% (01-03-22 @ 07:00) (100% - 100%)    GENERAL: NAD, well-developed, 36y  EENT: EOMI, conjunctiva and sclera clear, No nasal obstruction or discharge  RESPIRATORY: Clear to auscultation bilaterally; No wheeze or crackles  CARDIOVASCULAR: Regular rate and rhythm; No murmurs, rubs, or gallops, no pitting edema  GASTROINTESTINAL: Abdomen Soft, Nontender, Nondistended, +BS  MUSCULOSKELETAL:  No cyanosis, extremities grossly symmetrical  PSYCH: AAOx3, affect appropriate  NEUROLOGY: non-focal, cognition grossly intact, MAEE    ADMISSION SUMMARY  Patient is a 36y old Female who presents with a chief complaint of Mild DKA (02 Jan 2022 14:19)    CRISTI MONTGOMERY 36y Female  MRN#: [id number]     SUBJECTIVE      Interval/Overnight Events:    Today is hospital day 11d, and this morning she is lying in bed without distress. Patient endorsed having 1 episode of bowel movement with blood.  No acute overnight events.     OBJECTIVE  PAST MEDICAL & SURGICAL HISTORY  Neuropathy  right lower extremity, vaginal    Type 1 diabetes    Degenerative disc disease, thoracic    Urinary tract infection, recurrent    Gastroesophageal reflux disease, esophagitis presence not specified    Intractable headache, unspecified chronicity pattern, unspecified headache type    Major depressive disorder with single episode, in full remission  previously treated with zyprexa, celexa and lexapro    Tremor of right hand    Tachycardia    Spinal stenosis    No significant past surgical history      ALLERGIES:  amoxicillin (Unknown)  Ceclor (Rash)  ciprofloxacin (Unknown)  clindamycin (Unknown)  flu vaccines (Other)  gabapentin (Unknown)  Influenza Virus Vaccine, H5N1, Inactivated (Unknown)  penicillins (Unknown)    MEDICATIONS:  STANDING MEDICATIONS  amitriptyline Oral Tab/Cap - Peds 75 milliGRAM(s) Oral at bedtime  ammonium lactate 12% Lotion 1 Application(s) Topical every 12 hours  aspirin  chewable 81 milliGRAM(s) Oral daily  chlorhexidine 4% Liquid 1 Application(s) Topical daily  dextrose 40% Gel 15 Gram(s) Oral once  dextrose 5%. 1000 milliLiter(s) IV Continuous <Continuous>  dextrose 5%. 1000 milliLiter(s) IV Continuous <Continuous>  dextrose 50% Injectable 25 Gram(s) IV Push once  dextrose 50% Injectable 12.5 Gram(s) IV Push once  dextrose 50% Injectable 25 Gram(s) IV Push once  dextrose 50% Injectable 25 milliLiter(s) IV Push once  enoxaparin Injectable 40 milliGRAM(s) SubCutaneous daily  escitalopram 10 milliGRAM(s) Oral daily  escitalopram 5 milliGRAM(s) Oral daily  famotidine    Tablet 40 milliGRAM(s) Oral two times a day  glucagon  Injectable 1 milliGRAM(s) IntraMuscular once  insulin glargine Injectable (LANTUS) 35 Unit(s) SubCutaneous every morning  insulin glargine Injectable (LANTUS) 10 Unit(s) SubCutaneous at bedtime  insulin lispro (ADMELOG) corrective regimen sliding scale   SubCutaneous three times a day before meals  insulin lispro Injectable (ADMELOG) 10 Unit(s) SubCutaneous three times a day before meals  metoprolol tartrate 25 milliGRAM(s) Oral two times a day  polyethylene glycol 3350 17 Gram(s) Oral daily  senna 2 Tablet(s) Oral at bedtime  sodium chloride 0.9%. 1000 milliLiter(s) IV Continuous <Continuous>    PRN MEDICATIONS  acetaminophen     Tablet .. 650 milliGRAM(s) Oral every 6 hours PRN  benzonatate 100 milliGRAM(s) Oral three times a day PRN  bisacodyl Suppository 10 milliGRAM(s) Rectal daily PRN  cyclobenzaprine 10 milliGRAM(s) Oral three times a day PRN  guaifenesin/dextromethorphan Oral Liquid 10 milliLiter(s) Oral every 4 hours PRN    HOME MEDICATIONS  Home Medications:  amitriptyline 75 mg oral tablet: 1 tab(s) orally once a day (at bedtime) (04 Nov 2021 15:06)  ammonium lactate 12% topical cream: Apply topically to affected area 2 times a day (23 Dec 2021 13:53)  aspirin 81 mg oral tablet, chewable: 1 tab(s) orally once a day (28 Dec 2021 09:29)  cyclobenzaprine 10 mg oral tablet: 1 tab(s) orally once a day (at bedtime) (28 Dec 2021 08:33)  metoprolol tartrate 25 mg oral tablet: 1 tab(s) orally 2 times a day (04 Nov 2021 15:06)  multivitamin: 1 tab(s) orally once a day (04 Nov 2021 15:06)      LABS:                        10.4   8.26  )-----------( 400      ( 02 Jan 2022 13:20 )             32.4     01-03    142  |  102  |  10  ----------------------------<  273<H>  4.2   |  20  |  0.6<L>    Ca    8.8      03 Jan 2022 04:30  Mg     1.9     01-02    TPro  7.5  /  Alb  4.2  /  TBili  <0.2  /  DBili  x   /  AST  125<H>  /  ALT  331<H>  /  AlkPhos  151<H>  01-03    LIVER FUNCTIONS - ( 03 Jan 2022 04:30 )  Alb: 4.2 g/dL / Pro: 7.5 g/dL / ALK PHOS: 151 U/L / ALT: 331 U/L / AST: 125 U/L / GGT: x           PT/INR - ( 01 Jan 2022 16:00 )   PT: 12.50 sec;   INR: 1.09 ratio                   Culture - Urine (collected 01 Jan 2022 03:38)  Source: Clean Catch Clean Catch (Midstream)  Final Report (02 Jan 2022 12:01):    <10,000 CFU/mL Normal Urogenital Yadira    Culture - Blood (collected 31 Dec 2021 23:30)  Source: .Blood Blood  Preliminary Report (02 Jan 2022 13:01):    No growth to date.    Culture - Blood (collected 31 Dec 2021 22:44)  Source: .Blood Blood  Preliminary Report (02 Jan 2022 09:01):    No growth to date.          CAPILLARY BLOOD GLUCOSE      POCT Blood Glucose.: 255 mg/dL (03 Jan 2022 08:19)      PHYSICAL EXAM:  T(C): 35.7 (01-03-22 @ 07:00), Max: 36.7 (01-03-22 @ 01:00)  HR: 96 (01-03-22 @ 07:00) (96 - 103)  BP: 120/65 (01-03-22 @ 07:00) (120/65 - 138/73)  RR: 19 (01-03-22 @ 07:00) (18 - 19)  SpO2: 100% (01-03-22 @ 07:00) (100% - 100%)    GENERAL: NAD, well-developed, 36y  EENT: EOMI, conjunctiva and sclera clear, No nasal obstruction or discharge  RESPIRATORY: Clear to auscultation bilaterally; No wheeze or crackles  CARDIOVASCULAR: Regular rate and rhythm; No murmurs, rubs, or gallops, no pitting edema  GASTROINTESTINAL: Abdomen Soft, Nontender, Nondistended, +BS  MUSCULOSKELETAL:  No cyanosis, extremities grossly symmetrical  PSYCH: AAOx3, affect appropriate  NEUROLOGY: non-focal, cognition grossly intact, MAEE

## 2022-01-04 LAB
ALBUMIN SERPL ELPH-MCNC: 4 G/DL — SIGNIFICANT CHANGE UP (ref 3.5–5.2)
ALP SERPL-CCNC: 123 U/L — HIGH (ref 30–115)
ALT FLD-CCNC: 244 U/L — HIGH (ref 0–41)
ANION GAP SERPL CALC-SCNC: 15 MMOL/L — HIGH (ref 7–14)
ANION GAP SERPL CALC-SCNC: 16 MMOL/L — HIGH (ref 7–14)
AST SERPL-CCNC: 71 U/L — HIGH (ref 0–41)
B-OH-BUTYR SERPL-SCNC: 0.4 MMOL/L — SIGNIFICANT CHANGE UP
BASOPHILS # BLD AUTO: 0.02 K/UL — SIGNIFICANT CHANGE UP (ref 0–0.2)
BASOPHILS NFR BLD AUTO: 0.5 % — SIGNIFICANT CHANGE UP (ref 0–1)
BILIRUB SERPL-MCNC: <0.2 MG/DL — SIGNIFICANT CHANGE UP (ref 0.2–1.2)
BUN SERPL-MCNC: 5 MG/DL — LOW (ref 10–20)
BUN SERPL-MCNC: 7 MG/DL — LOW (ref 10–20)
CALCIUM SERPL-MCNC: 8.5 MG/DL — SIGNIFICANT CHANGE UP (ref 8.5–10.1)
CALCIUM SERPL-MCNC: 8.8 MG/DL — SIGNIFICANT CHANGE UP (ref 8.5–10.1)
CHLORIDE SERPL-SCNC: 101 MMOL/L — SIGNIFICANT CHANGE UP (ref 98–110)
CHLORIDE SERPL-SCNC: 99 MMOL/L — SIGNIFICANT CHANGE UP (ref 98–110)
CO2 SERPL-SCNC: 21 MMOL/L — SIGNIFICANT CHANGE UP (ref 17–32)
CO2 SERPL-SCNC: 23 MMOL/L — SIGNIFICANT CHANGE UP (ref 17–32)
CREAT SERPL-MCNC: 0.5 MG/DL — LOW (ref 0.7–1.5)
CREAT SERPL-MCNC: 0.5 MG/DL — LOW (ref 0.7–1.5)
EOSINOPHIL # BLD AUTO: 0.01 K/UL — SIGNIFICANT CHANGE UP (ref 0–0.7)
EOSINOPHIL NFR BLD AUTO: 0.2 % — SIGNIFICANT CHANGE UP (ref 0–8)
GLUCOSE BLDC GLUCOMTR-MCNC: 116 MG/DL — HIGH (ref 70–99)
GLUCOSE BLDC GLUCOMTR-MCNC: 159 MG/DL — HIGH (ref 70–99)
GLUCOSE BLDC GLUCOMTR-MCNC: 162 MG/DL — HIGH (ref 70–99)
GLUCOSE BLDC GLUCOMTR-MCNC: 253 MG/DL — HIGH (ref 70–99)
GLUCOSE BLDC GLUCOMTR-MCNC: 263 MG/DL — HIGH (ref 70–99)
GLUCOSE BLDC GLUCOMTR-MCNC: 290 MG/DL — HIGH (ref 70–99)
GLUCOSE BLDC GLUCOMTR-MCNC: 61 MG/DL — LOW (ref 70–99)
GLUCOSE BLDC GLUCOMTR-MCNC: 75 MG/DL — SIGNIFICANT CHANGE UP (ref 70–99)
GLUCOSE SERPL-MCNC: 174 MG/DL — HIGH (ref 70–99)
GLUCOSE SERPL-MCNC: 191 MG/DL — HIGH (ref 70–99)
HCT VFR BLD CALC: 30 % — LOW (ref 37–47)
HCT VFR BLD CALC: 33.2 % — LOW (ref 37–47)
HGB BLD-MCNC: 10.6 G/DL — LOW (ref 12–16)
HGB BLD-MCNC: 9.4 G/DL — LOW (ref 12–16)
IMM GRANULOCYTES NFR BLD AUTO: 0.5 % — HIGH (ref 0.1–0.3)
LYMPHOCYTES # BLD AUTO: 1.82 K/UL — SIGNIFICANT CHANGE UP (ref 1.2–3.4)
LYMPHOCYTES # BLD AUTO: 41.3 % — SIGNIFICANT CHANGE UP (ref 20.5–51.1)
MAGNESIUM SERPL-MCNC: 1.5 MG/DL — LOW (ref 1.8–2.4)
MAGNESIUM SERPL-MCNC: 2.4 MG/DL — SIGNIFICANT CHANGE UP (ref 1.8–2.4)
MCHC RBC-ENTMCNC: 26.3 PG — LOW (ref 27–31)
MCHC RBC-ENTMCNC: 26.6 PG — LOW (ref 27–31)
MCHC RBC-ENTMCNC: 31.3 G/DL — LOW (ref 32–37)
MCHC RBC-ENTMCNC: 31.9 G/DL — LOW (ref 32–37)
MCV RBC AUTO: 83.4 FL — SIGNIFICANT CHANGE UP (ref 81–99)
MCV RBC AUTO: 84 FL — SIGNIFICANT CHANGE UP (ref 81–99)
MONOCYTES # BLD AUTO: 0.49 K/UL — SIGNIFICANT CHANGE UP (ref 0.1–0.6)
MONOCYTES NFR BLD AUTO: 11.1 % — HIGH (ref 1.7–9.3)
NEUTROPHILS # BLD AUTO: 2.05 K/UL — SIGNIFICANT CHANGE UP (ref 1.4–6.5)
NEUTROPHILS NFR BLD AUTO: 46.4 % — SIGNIFICANT CHANGE UP (ref 42.2–75.2)
NRBC # BLD: 0 /100 WBCS — SIGNIFICANT CHANGE UP (ref 0–0)
NRBC # BLD: 0 /100 WBCS — SIGNIFICANT CHANGE UP (ref 0–0)
PLATELET # BLD AUTO: 220 K/UL — SIGNIFICANT CHANGE UP (ref 130–400)
PLATELET # BLD AUTO: 397 K/UL — SIGNIFICANT CHANGE UP (ref 130–400)
POTASSIUM SERPL-MCNC: 3.9 MMOL/L — SIGNIFICANT CHANGE UP (ref 3.5–5)
POTASSIUM SERPL-MCNC: 4.3 MMOL/L — SIGNIFICANT CHANGE UP (ref 3.5–5)
POTASSIUM SERPL-SCNC: 3.9 MMOL/L — SIGNIFICANT CHANGE UP (ref 3.5–5)
POTASSIUM SERPL-SCNC: 4.3 MMOL/L — SIGNIFICANT CHANGE UP (ref 3.5–5)
PROT SERPL-MCNC: 6.9 G/DL — SIGNIFICANT CHANGE UP (ref 6–8)
RBC # BLD: 3.57 M/UL — LOW (ref 4.2–5.4)
RBC # BLD: 3.98 M/UL — LOW (ref 4.2–5.4)
RBC # FLD: 14.4 % — SIGNIFICANT CHANGE UP (ref 11.5–14.5)
RBC # FLD: 14.4 % — SIGNIFICANT CHANGE UP (ref 11.5–14.5)
SARS-COV-2 RNA SPEC QL NAA+PROBE: DETECTED
SODIUM SERPL-SCNC: 137 MMOL/L — SIGNIFICANT CHANGE UP (ref 135–146)
SODIUM SERPL-SCNC: 138 MMOL/L — SIGNIFICANT CHANGE UP (ref 135–146)
WBC # BLD: 4.41 K/UL — LOW (ref 4.8–10.8)
WBC # BLD: 4.84 K/UL — SIGNIFICANT CHANGE UP (ref 4.8–10.8)
WBC # FLD AUTO: 4.41 K/UL — LOW (ref 4.8–10.8)
WBC # FLD AUTO: 4.84 K/UL — SIGNIFICANT CHANGE UP (ref 4.8–10.8)

## 2022-01-04 PROCEDURE — 99233 SBSQ HOSP IP/OBS HIGH 50: CPT

## 2022-01-04 PROCEDURE — 99232 SBSQ HOSP IP/OBS MODERATE 35: CPT

## 2022-01-04 RX ORDER — INSULIN GLARGINE 100 [IU]/ML
12 INJECTION, SOLUTION SUBCUTANEOUS AT BEDTIME
Refills: 0 | Status: DISCONTINUED | OUTPATIENT
Start: 2022-01-04 | End: 2022-01-11

## 2022-01-04 RX ORDER — INSULIN LISPRO 100/ML
10 VIAL (ML) SUBCUTANEOUS ONCE
Refills: 0 | Status: COMPLETED | OUTPATIENT
Start: 2022-01-04 | End: 2022-01-04

## 2022-01-04 RX ORDER — MAGNESIUM OXIDE 400 MG ORAL TABLET 241.3 MG
400 TABLET ORAL ONCE
Refills: 0 | Status: COMPLETED | OUTPATIENT
Start: 2022-01-04 | End: 2022-01-04

## 2022-01-04 RX ADMIN — CHLORHEXIDINE GLUCONATE 1 APPLICATION(S): 213 SOLUTION TOPICAL at 12:26

## 2022-01-04 RX ADMIN — Medication 3: at 11:31

## 2022-01-04 RX ADMIN — Medication 13 UNIT(S): at 11:32

## 2022-01-04 RX ADMIN — Medication 75 MILLIGRAM(S): at 22:28

## 2022-01-04 RX ADMIN — Medication 13 UNIT(S): at 17:16

## 2022-01-04 RX ADMIN — ESCITALOPRAM OXALATE 10 MILLIGRAM(S): 10 TABLET, FILM COATED ORAL at 11:33

## 2022-01-04 RX ADMIN — ESCITALOPRAM OXALATE 5 MILLIGRAM(S): 10 TABLET, FILM COATED ORAL at 11:34

## 2022-01-04 RX ADMIN — Medication 3: at 17:15

## 2022-01-04 RX ADMIN — FAMOTIDINE 40 MILLIGRAM(S): 10 INJECTION INTRAVENOUS at 05:43

## 2022-01-04 RX ADMIN — FAMOTIDINE 40 MILLIGRAM(S): 10 INJECTION INTRAVENOUS at 17:18

## 2022-01-04 RX ADMIN — Medication 25 MILLIGRAM(S): at 05:47

## 2022-01-04 RX ADMIN — CYCLOBENZAPRINE HYDROCHLORIDE 10 MILLIGRAM(S): 10 TABLET, FILM COATED ORAL at 22:57

## 2022-01-04 RX ADMIN — Medication 25 GRAM(S): at 05:43

## 2022-01-04 RX ADMIN — INSULIN GLARGINE 40 UNIT(S): 100 INJECTION, SOLUTION SUBCUTANEOUS at 12:24

## 2022-01-04 RX ADMIN — Medication 1 APPLICATION(S): at 18:08

## 2022-01-04 RX ADMIN — INSULIN GLARGINE 12 UNIT(S): 100 INJECTION, SOLUTION SUBCUTANEOUS at 22:10

## 2022-01-04 RX ADMIN — MAGNESIUM OXIDE 400 MG ORAL TABLET 400 MILLIGRAM(S): 241.3 TABLET ORAL at 05:47

## 2022-01-04 RX ADMIN — Medication 10 UNIT(S): at 00:30

## 2022-01-04 RX ADMIN — SENNA PLUS 2 TABLET(S): 8.6 TABLET ORAL at 22:27

## 2022-01-04 RX ADMIN — POLYETHYLENE GLYCOL 3350 17 GRAM(S): 17 POWDER, FOR SOLUTION ORAL at 11:35

## 2022-01-04 RX ADMIN — Medication 25 MILLIGRAM(S): at 17:18

## 2022-01-04 RX ADMIN — Medication 81 MILLIGRAM(S): at 11:32

## 2022-01-04 RX ADMIN — ENOXAPARIN SODIUM 40 MILLIGRAM(S): 100 INJECTION SUBCUTANEOUS at 11:33

## 2022-01-04 NOTE — PROGRESS NOTE ADULT - ASSESSMENT
37 y/o F with PMHx Type I DM on an insulin pump, complicated by Diabetic neuropathy and vulvodynia, HTN, chronic back pain, migraine headaches,  presents with lower quadrant abdominal pain, nausea and generalized weakness for 3 days in the setting of elevated sugars, elevated beta-hydroxybuturate, mild GAP, all concerning for DKA, now resolved, was waiting for d/c upon arrival of new insulin pump but became COVID + while waiting     # COVID-19 PNA  - PCR positive  - 1 episode of high grade fever  - on dexamethasone 6mg q24h (day 2 of 10)  - s/p remdesivir 200mg x1  - ID consulted  - course complicated by rising LFTs, AST/ALT: 310/436  - stopped RDV, steroids as per ID. LFTs trending down  - c/w Robitussin 10mL Q4 PRN Cough  - c/w Benzonatate 100mg Oral three times a day PRN Cough  - wean 02 as titrated    # Abdominal Pain   # Uptrending LFTs  -CT Abdomen and Pelvis w/ IV Cont 12/2/21:  No acute intra-abdominal or pelvic pathology is identified  - CLD Work up: completed, all labs collected   - LFTs uptrending, stopped RDV, steroids as per ID  - obtained blood culture  - RUQ US no significant findings  - monitor LFTs  - follow up outpatient with GI For MRI of liver lesions and colonoscopy to r/o ibd     # Diabetic Ketoacidosis, resolved  - patient treated in the ICU for DKA, now resolved  - episode of hypoglycemia, tx w/ dextrose, lispro decreased  - followed by Endocrinology, previously on insulin pump  - Per Dr. Hendricks, insulin pump will be available on Tuesday, family refusing discharge until insulin pump delivered     # Anxiety Disorder   - patient with hx of anxiety- started on lexapro 5 mg per psychiatry   - requests xanax occasionally    # Sinus Tachycardia, resolved  - c/w metoprolol     # Abdominal Pain   # Uptrending LFTs  -CT Abdomen and Pelvis w/ IV Cont 12/2/21:  No acute intra-abdominal or pelvic pathology is identified  - CLD Work up: completed, all labs collected   - RUQ US: no significant findings  - follow up outpatient with GI For MRI of liver lesions and colonoscopy to r/o ibd     Misc:   # DVT ppx: lovenox 40mg subQ daily  # GI ppx: N/A  # Diet: DASH/TLC & CC  # Activity: AAT  # CODE: Full Code  # Dispo: discharge planning    #Progress Note Handoff  Pending (specify): trend LFTs   37 y/o F with PMHx Type I DM on an insulin pump, complicated by Diabetic neuropathy and vulvodynia, HTN, chronic back pain, migraine headaches,  presents with lower quadrant abdominal pain, nausea and generalized weakness for 3 days in the setting of elevated sugars, elevated beta-hydroxybuturate, mild GAP, all concerning for DKA, now resolved, was waiting for d/c upon arrival of new insulin pump but became COVID + while waiting     # COVID-19 PNA  - PCR positive  - 1 episode of high grade fever  - on dexamethasone 6mg q24h (day 2 of 10)  - s/p remdesivir 200mg x1  - ID consulted  - course complicated by rising LFTs, AST/ALT: 310/436  - stopped RDV, steroids as per ID. LFTs trending down  - c/w Robitussin 10mL Q4 PRN Cough  - c/w Benzonatate 100mg Oral three times a day PRN Cough  - wean 02 as titrated    # Abdominal Pain, resolved  # Uptrending LFTs, resolved  -CT Abdomen and Pelvis w/ IV Cont 12/2/21:  No acute intra-abdominal or pelvic pathology is identified  - CLD Work up: completed, all labs collected   - LFTs uptrending, stopped RDV, steroids as per ID  - obtained blood culture  - RUQ US no significant findings  - monitor LFTs  - follow up outpatient with GI For MRI of liver lesions and colonoscopy to r/o ibd     # Diabetic Ketoacidosis, resolved  - patient treated in the ICU for DKA, now resolved  - episode of hypoglycemia, tx w/ dextrose, lispro decreased  - followed by Endocrinology, previously on insulin pump  - Per Dr. Hendricks, insulin pump will be available on Tuesday, family refusing discharge until insulin pump delivered     # Anxiety Disorder   - patient with hx of anxiety- started on lexapro 5 mg per psychiatry   - requests xanax occasionally    # Sinus Tachycardia, resolved  - c/w metoprolol     # Abdominal Pain   # Uptrending LFTs  -CT Abdomen and Pelvis w/ IV Cont 12/2/21:  No acute intra-abdominal or pelvic pathology is identified  - CLD Work up: completed, all labs collected   - RUQ US: no significant findings  - follow up outpatient with GI For MRI of liver lesions and colonoscopy to r/o ibd     Misc:   # DVT ppx: lovenox 40mg subQ daily  # GI ppx: N/A  # Diet: DASH/TLC & CC  # Activity: AAT  # CODE: Full Code  # Dispo: discharge planning    #Progress Note Handoff  Pending (specify): trend LFTs   37 y/o F with PMHx Type I DM on an insulin pump, complicated by Diabetic neuropathy and vulvodynia, HTN, chronic back pain, migraine headaches,  presents with lower quadrant abdominal pain, nausea and generalized weakness for 3 days in the setting of elevated sugars, elevated beta-hydroxybuturate, mild GAP, all concerning for DKA, now resolved, was waiting for d/c upon arrival of new insulin pump but became COVID + while waiting     # COVID-19 PNA  - PCR positive  - 1 episode of high grade fever  - on dexamethasone 6mg q24h (day 2 of 10)  - s/p remdesivir 200mg x1  - ID consulted  - course complicated by rising LFTs, AST/ALT: 310/436  - stopped RDV, steroids as per ID. LFTs trending down  - c/w Robitussin 10mL Q4 PRN Cough  - c/w Benzonatate 100mg Oral three times a day PRN Cough  - wean 02 as titrated    # Abdominal Pain, resolved  # Uptrending LFTs, resolved  -CT Abdomen and Pelvis w/ IV Cont 12/2/21:  No acute intra-abdominal or pelvic pathology is identified  - CLD Work up: completed, all labs collected   - LFTs uptrending, stopped RDV, steroids as per ID  - blood cultures 12/31 NGTD  - RUQ US no significant findings  - monitor LFTs  - follow up outpatient with GI For MRI of liver lesions and colonoscopy to r/o ibd     # Diabetic Ketoacidosis, resolved  - patient treated in the ICU for DKA, now resolved  - episode of hypoglycemia, tx w/ dextrose, lispro decreased  - insulin lantus 40 units AM, 12 units bedtime, lispro 13 units before meals, SS  - followed by Endocrinology, previously on insulin pump  - Per Dr. Hendricks, insulin pump will be available on Tuesday, family refusing discharge until insulin pump delivered     # Anxiety Disorder   - patient with hx of anxiety- started on lexapro 5 mg per psychiatry   - requests xanax occasionally    # Sinus Tachycardia, resolved  - c/w metoprolol     # Abdominal Pain   # Uptrending LFTs  -CT Abdomen and Pelvis w/ IV Cont 12/2/21:  No acute intra-abdominal or pelvic pathology is identified  - CLD Work up: completed, all labs collected   - RUQ US: no significant findings  - follow up outpatient with GI For MRI of liver lesions and colonoscopy to r/o ibd     Misc:   # DVT ppx: lovenox 40mg subQ daily  # GI ppx: N/A  # Diet: DASH/TLC & CC  # Activity: AAT  # CODE: Full Code  # Dispo: discharge planning    #Progress Note Handoff  Pending (specify): trend LFTs

## 2022-01-04 NOTE — PROGRESS NOTE ADULT - SUBJECTIVE AND OBJECTIVE BOX
S: patient seen this pm , continue to have chest pain and nausea , no SOB and not on any O2   o:  T(C): 36.6 (04 Jan 2022 07:00), Max: 36.9 (04 Jan 2022 00:00)  T(F): 97.9 (04 Jan 2022 07:00), Max: 98.5 (04 Jan 2022 00:00)  HR: 92 (04 Jan 2022 07:00) (85 - 92)  BP: 140/78 (04 Jan 2022 07:00) (140/78 - 146/70)  BP(mean): --  ABP: --  ABP(mean): --  RR: 20 (04 Jan 2022 07:00) (18 - 20)  SpO2: 100% (04 Jan 2022 07:00) (95% - 100%)      PE: not in acute distress , awake alert and oriented x3   deferred      MEDICATIONS  (STANDING):  amitriptyline Oral Tab/Cap - Peds 75 milliGRAM(s) Oral at bedtime  ammonium lactate 12% Lotion 1 Application(s) Topical every 12 hours  aspirin  chewable 81 milliGRAM(s) Oral daily  chlorhexidine 4% Liquid 1 Application(s) Topical daily  enoxaparin Injectable 40 milliGRAM(s) SubCutaneous daily  escitalopram 5 milliGRAM(s) Oral daily  escitalopram 10 milliGRAM(s) Oral daily  famotidine    Tablet 40 milliGRAM(s) Oral two times a day  insulin glargine Injectable (LANTUS) 40 Unit(s) SubCutaneous every morning  insulin glargine Injectable (LANTUS) 12 Unit(s) SubCutaneous at bedtime  insulin lispro (ADMELOG) corrective regimen sliding scale   SubCutaneous three times a day before meals  insulin lispro Injectable (ADMELOG) 13 Unit(s) SubCutaneous three times a day before meals  metoprolol tartrate 25 milliGRAM(s) Oral two times a day  polyethylene glycol 3350 17 Gram(s) Oral daily  senna 2 Tablet(s) Oral at bedtime  sodium chloride 0.9%. 1000 milliLiter(s) (100 mL/Hr) IV Continuous <Continuous>    MEDICATIONS  (PRN):  acetaminophen     Tablet .. 650 milliGRAM(s) Oral every 6 hours PRN Moderate Pain (4 - 6)  aluminum hydroxide/magnesium hydroxide/simethicone Suspension 30 milliLiter(s) Oral every 4 hours PRN Dyspepsia  benzonatate 100 milliGRAM(s) Oral three times a day PRN Cough  bisacodyl Suppository 10 milliGRAM(s) Rectal daily PRN Constipation  cyclobenzaprine 10 milliGRAM(s) Oral three times a day PRN Muscle Spasm  guaifenesin/dextromethorphan Oral Liquid 10 milliLiter(s) Oral every 4 hours PRN Cough         01-04    138  |  99  |  5<L>  ----------------------------<  191<H>  4.3   |  23  |  0.5<L>    Ca    8.8      04 Jan 2022 08:56  Mg     2.4     01-04    TPro  6.9  /  Alb  4.0  /  TBili  <0.2  /  DBili  x   /  AST  71<H>  /  ALT  244<H>  /  AlkPhos  123<H>  01-04      A1C with Estimated Average Glucose Result: 8.6: Method: Immunoassay

## 2022-01-04 NOTE — PROGRESS NOTE ADULT - SUBJECTIVE AND OBJECTIVE BOX
CRISTI MONTGOMERY 36y Female  MRN#: [id number]     SUBJECTIVE  Patient is a 36y old Female who presents with a chief complaint of Mild DKA (04 Jan 2022 09:20)    Interval/Overnight Events:    Today is hospital day 12d, and this morning she is lying in bed without distress.   No acute overnight events.     OBJECTIVE  PAST MEDICAL & SURGICAL HISTORY  Neuropathy  right lower extremity, vaginal    Type 1 diabetes    Degenerative disc disease, thoracic    Urinary tract infection, recurrent    Gastroesophageal reflux disease, esophagitis presence not specified    Intractable headache, unspecified chronicity pattern, unspecified headache type    Major depressive disorder with single episode, in full remission  previously treated with zyprexa, celexa and lexapro    Tremor of right hand    Tachycardia    Spinal stenosis    No significant past surgical history      ALLERGIES:  amoxicillin (Unknown)  Ceclor (Rash)  ciprofloxacin (Unknown)  clindamycin (Unknown)  flu vaccines (Other)  gabapentin (Unknown)  Influenza Virus Vaccine, H5N1, Inactivated (Unknown)  penicillins (Unknown)    MEDICATIONS:  STANDING MEDICATIONS  amitriptyline Oral Tab/Cap - Peds 75 milliGRAM(s) Oral at bedtime  ammonium lactate 12% Lotion 1 Application(s) Topical every 12 hours  aspirin  chewable 81 milliGRAM(s) Oral daily  chlorhexidine 4% Liquid 1 Application(s) Topical daily  enoxaparin Injectable 40 milliGRAM(s) SubCutaneous daily  escitalopram 10 milliGRAM(s) Oral daily  escitalopram 5 milliGRAM(s) Oral daily  famotidine    Tablet 40 milliGRAM(s) Oral two times a day  insulin glargine Injectable (LANTUS) 40 Unit(s) SubCutaneous every morning  insulin glargine Injectable (LANTUS) 12 Unit(s) SubCutaneous at bedtime  insulin lispro (ADMELOG) corrective regimen sliding scale   SubCutaneous three times a day before meals  insulin lispro Injectable (ADMELOG) 13 Unit(s) SubCutaneous three times a day before meals  metoprolol tartrate 25 milliGRAM(s) Oral two times a day  polyethylene glycol 3350 17 Gram(s) Oral daily  senna 2 Tablet(s) Oral at bedtime  sodium chloride 0.9%. 1000 milliLiter(s) IV Continuous <Continuous>    PRN MEDICATIONS  acetaminophen     Tablet .. 650 milliGRAM(s) Oral every 6 hours PRN  aluminum hydroxide/magnesium hydroxide/simethicone Suspension 30 milliLiter(s) Oral every 4 hours PRN  benzonatate 100 milliGRAM(s) Oral three times a day PRN  bisacodyl Suppository 10 milliGRAM(s) Rectal daily PRN  cyclobenzaprine 10 milliGRAM(s) Oral three times a day PRN  guaifenesin/dextromethorphan Oral Liquid 10 milliLiter(s) Oral every 4 hours PRN    HOME MEDICATIONS  Home Medications:  amitriptyline 75 mg oral tablet: 1 tab(s) orally once a day (at bedtime) (04 Nov 2021 15:06)  ammonium lactate 12% topical cream: Apply topically to affected area 2 times a day (23 Dec 2021 13:53)  aspirin 81 mg oral tablet, chewable: 1 tab(s) orally once a day (28 Dec 2021 09:29)  cyclobenzaprine 10 mg oral tablet: 1 tab(s) orally once a day (at bedtime) (28 Dec 2021 08:33)  metoprolol tartrate 25 mg oral tablet: 1 tab(s) orally 2 times a day (04 Nov 2021 15:06)  multivitamin: 1 tab(s) orally once a day (04 Nov 2021 15:06)      LABS:                        10.6   4.41  )-----------( 397      ( 04 Jan 2022 08:56 )             33.2     01-04    138  |  99  |  5<L>  ----------------------------<  191<H>  4.3   |  23  |  0.5<L>    Ca    8.8      04 Jan 2022 08:56  Mg     2.4     01-04    TPro  6.9  /  Alb  4.0  /  TBili  <0.2  /  DBili  x   /  AST  71<H>  /  ALT  244<H>  /  AlkPhos  123<H>  01-04    LIVER FUNCTIONS - ( 04 Jan 2022 08:56 )  Alb: 4.0 g/dL / Pro: 6.9 g/dL / ALK PHOS: 123 U/L / ALT: 244 U/L / AST: 71 U/L / GGT: x                         CAPILLARY BLOOD GLUCOSE      POCT Blood Glucose.: 290 mg/dL (04 Jan 2022 11:27)      PHYSICAL EXAM:  T(C): 36.6 (01-04-22 @ 07:00), Max: 36.9 (01-04-22 @ 00:00)  HR: 92 (01-04-22 @ 07:00) (70 - 92)  BP: 140/78 (01-04-22 @ 07:00) (140/78 - 151/86)  RR: 20 (01-04-22 @ 07:00) (18 - 20)  SpO2: 100% (01-04-22 @ 07:00) (95% - 100%)    GENERAL: NAD, well-developed, 36y  EENT: EOMI, conjunctiva and sclera clear, No nasal obstruction or discharge  RESPIRATORY: Clear to auscultation bilaterally; No wheeze or crackles  CARDIOVASCULAR: Regular rate and rhythm; No murmurs, rubs, or gallops, no pitting edema  GASTROINTESTINAL: Abdomen Soft, Nontender, Nondistended, +BS  MUSCULOSKELETAL:  No cyanosis, extremities grossly symmetrical  PSYCH: AAOx3, affect appropriate  NEUROLOGY: non-focal, cognition grossly intact, MAEE    ADMISSION SUMMARY  Patient is a 36y old Female who presents with a chief complaint of Mild DKA (04 Jan 2022 09:20)

## 2022-01-04 NOTE — PROGRESS NOTE ADULT - ASSESSMENT
· Assessment	  35 y/o F with PMHx Type I DM on an insulin pump, complicated by Diabetic neuropathy and vulvodynia, HTN, chronic back pain, migraine headaches,  presents with lower quadrant abdominal pain, nausea and generalized weakness for 3 days in the setting of elevated sugars, elevated beta-hydroxybuturate, mild GAP, all concerning for DKA.    IMPRESSION;  Pt is clinically stable. No complaints, improved oxygenation,   COVID with Moderate illness : clinically has  lower respiratory disease ( SOB/cough ) and abnormal CXR and a SaO2>93% on supplemental O2  12/28 COVID-19 NG  12/31 COVID-19 positive  Pt is in the early viral replicative phase based on the timeline/onset of symptoms.  CXR no GGO  Transaminitis : decreased . Clinically no cholangitis > US unremarkbale  WBC 4.8    RECOMMENDATIONS;  No RDV/steroids  Could d/c  Please do not hesitate to recall ID if any questions arise either through [facility] or through [facility] teams

## 2022-01-04 NOTE — PROGRESS NOTE ADULT - SUBJECTIVE AND OBJECTIVE BOX
CRISTI MONTGOMERY  36y, Female    All available historical data reviewed    OVERNIGHT EVENTS:  no fevers  having breakfast  98% RA    ROS:  General: Denies rigors, nightsweats  HEENT: Denies headache, rhinorrhea, sore throat, eye pain  CV: Denies CP, palpitations  PULM: Denies wheezing, hemoptysis  GI: Denies hematemesis, hematochezia, melena  : Denies discharge, hematuria  MSK: Denies arthralgias, myalgias  SKIN: Denies rash, lesions  NEURO: Denies paresthesias, weakness  PSYCH: Denies depression, anxiety    VITALS:  T(F): 97.9, Max: 98.5 (01-04-22 @ 00:00)  HR: 92  BP: 140/78  RR: 20Vital Signs Last 24 Hrs  T(C): 36.6 (04 Jan 2022 07:00), Max: 36.9 (04 Jan 2022 00:00)  T(F): 97.9 (04 Jan 2022 07:00), Max: 98.5 (04 Jan 2022 00:00)  HR: 92 (04 Jan 2022 07:00) (70 - 92)  BP: 140/78 (04 Jan 2022 07:00) (140/78 - 151/86)  BP(mean): --  RR: 20 (04 Jan 2022 07:00) (18 - 20)  SpO2: 100% (04 Jan 2022 07:00) (95% - 100%)    TESTS & MEASUREMENTS:                        9.4    4.84  )-----------( 220      ( 04 Jan 2022 00:58 )             30.0     01-04    137  |  101  |  7<L>  ----------------------------<  174<H>  3.9   |  21  |  0.5<L>    Ca    8.5      04 Jan 2022 00:58  Mg     1.5     01-04    TPro  6.9  /  Alb  3.8  /  TBili  <0.2  /  DBili  x   /  AST  103<H>  /  ALT  281<H>  /  AlkPhos  165<H>  01-03    LIVER FUNCTIONS - ( 03 Jan 2022 16:00 )  Alb: 3.8 g/dL / Pro: 6.9 g/dL / ALK PHOS: 165 U/L / ALT: 281 U/L / AST: 103 U/L / GGT: x             Culture - Urine (collected 01-01-22 @ 03:38)  Source: Clean Catch Clean Catch (Midstream)  Final Report (01-02-22 @ 12:01):    <10,000 CFU/mL Normal Urogenital Yadira    Culture - Blood (collected 12-31-21 @ 23:30)  Source: .Blood Blood  Preliminary Report (01-02-22 @ 13:01):    No growth to date.    Culture - Blood (collected 12-31-21 @ 22:44)  Source: .Blood Blood  Preliminary Report (01-02-22 @ 09:01):    No growth to date.            RADIOLOGY & ADDITIONAL TESTS:  Personal review of radiological diagnostics performed  Echo and EKG results noted when applicable.     MEDICATIONS:  acetaminophen     Tablet .. 650 milliGRAM(s) Oral every 6 hours PRN  aluminum hydroxide/magnesium hydroxide/simethicone Suspension 30 milliLiter(s) Oral every 4 hours PRN  amitriptyline Oral Tab/Cap - Peds 75 milliGRAM(s) Oral at bedtime  ammonium lactate 12% Lotion 1 Application(s) Topical every 12 hours  aspirin  chewable 81 milliGRAM(s) Oral daily  benzonatate 100 milliGRAM(s) Oral three times a day PRN  bisacodyl Suppository 10 milliGRAM(s) Rectal daily PRN  chlorhexidine 4% Liquid 1 Application(s) Topical daily  cyclobenzaprine 10 milliGRAM(s) Oral three times a day PRN  enoxaparin Injectable 40 milliGRAM(s) SubCutaneous daily  escitalopram 5 milliGRAM(s) Oral daily  escitalopram 10 milliGRAM(s) Oral daily  famotidine    Tablet 40 milliGRAM(s) Oral two times a day  guaifenesin/dextromethorphan Oral Liquid 10 milliLiter(s) Oral every 4 hours PRN  insulin glargine Injectable (LANTUS) 40 Unit(s) SubCutaneous every morning  insulin glargine Injectable (LANTUS) 12 Unit(s) SubCutaneous at bedtime  insulin lispro (ADMELOG) corrective regimen sliding scale   SubCutaneous three times a day before meals  insulin lispro Injectable (ADMELOG) 13 Unit(s) SubCutaneous three times a day before meals  metoprolol tartrate 25 milliGRAM(s) Oral two times a day  polyethylene glycol 3350 17 Gram(s) Oral daily  senna 2 Tablet(s) Oral at bedtime  sodium chloride 0.9%. 1000 milliLiter(s) IV Continuous <Continuous>      ANTIBIOTICS:

## 2022-01-04 NOTE — PROGRESS NOTE ADULT - ASSESSMENT
35 y/o F with PMHx Type I DM on an insulin pump, complicated by Diabetic neuropathy and vulvodynia, HTN, chronic back pain, migraine headaches,  presented with  DKA, now resolved, was waiting for d/c upon arrival of new insulin pump but became COVID + required steroids , now off .   patient well known to e from outpatient setting , DKA likely because of pump malfunctioning , patient unable to take insulin by pen because of policy where she lives ( can not take injection and there is not nurse on site 24 hours ).  - i resent prescription for new insulin pump and and will arrange her a virtual training to be trained on new pump . will keep same settings as her old pump (all in allscripts )   - Fs reviewed and hypo/hyperglycemia noted , now off insulin   - continue Lantus 40 units in am and agree with decreasing Lantus to 12 units at bedtime if any hypoglycemia stop bedtime lantus   - Continue lispro 13 units TIDAC   - change ISS to 2:50 >150 from 1:50 >150 TIDAC     will follow

## 2022-01-05 LAB
ALBUMIN SERPL ELPH-MCNC: 3.9 G/DL — SIGNIFICANT CHANGE UP (ref 3.5–5.2)
ALP SERPL-CCNC: 125 U/L — HIGH (ref 30–115)
ALT FLD-CCNC: 192 U/L — HIGH (ref 0–41)
ANION GAP SERPL CALC-SCNC: 15 MMOL/L — HIGH (ref 7–14)
AST SERPL-CCNC: 59 U/L — HIGH (ref 0–41)
BASOPHILS # BLD AUTO: 0.02 K/UL — SIGNIFICANT CHANGE UP (ref 0–0.2)
BASOPHILS NFR BLD AUTO: 0.6 % — SIGNIFICANT CHANGE UP (ref 0–1)
BILIRUB SERPL-MCNC: 0.3 MG/DL — SIGNIFICANT CHANGE UP (ref 0.2–1.2)
BLD GP AB SCN SERPL QL: SIGNIFICANT CHANGE UP
BUN SERPL-MCNC: 5 MG/DL — LOW (ref 10–20)
CALCIUM SERPL-MCNC: 8.8 MG/DL — SIGNIFICANT CHANGE UP (ref 8.5–10.1)
CHLORIDE SERPL-SCNC: 103 MMOL/L — SIGNIFICANT CHANGE UP (ref 98–110)
CO2 SERPL-SCNC: 20 MMOL/L — SIGNIFICANT CHANGE UP (ref 17–32)
CREAT SERPL-MCNC: 0.5 MG/DL — LOW (ref 0.7–1.5)
EOSINOPHIL # BLD AUTO: 0.02 K/UL — SIGNIFICANT CHANGE UP (ref 0–0.7)
EOSINOPHIL NFR BLD AUTO: 0.6 % — SIGNIFICANT CHANGE UP (ref 0–8)
GLUCOSE BLDC GLUCOMTR-MCNC: 103 MG/DL — HIGH (ref 70–99)
GLUCOSE BLDC GLUCOMTR-MCNC: 104 MG/DL — HIGH (ref 70–99)
GLUCOSE BLDC GLUCOMTR-MCNC: 137 MG/DL — HIGH (ref 70–99)
GLUCOSE BLDC GLUCOMTR-MCNC: 55 MG/DL — LOW (ref 70–99)
GLUCOSE BLDC GLUCOMTR-MCNC: 98 MG/DL — SIGNIFICANT CHANGE UP (ref 70–99)
GLUCOSE SERPL-MCNC: 138 MG/DL — HIGH (ref 70–99)
HCT VFR BLD CALC: 34.1 % — LOW (ref 37–47)
HGB BLD-MCNC: 10.7 G/DL — LOW (ref 12–16)
IMM GRANULOCYTES NFR BLD AUTO: 0.3 % — SIGNIFICANT CHANGE UP (ref 0.1–0.3)
LYMPHOCYTES # BLD AUTO: 1.54 K/UL — SIGNIFICANT CHANGE UP (ref 1.2–3.4)
LYMPHOCYTES # BLD AUTO: 44.1 % — SIGNIFICANT CHANGE UP (ref 20.5–51.1)
MAGNESIUM SERPL-MCNC: 2 MG/DL — SIGNIFICANT CHANGE UP (ref 1.8–2.4)
MCHC RBC-ENTMCNC: 26.4 PG — LOW (ref 27–31)
MCHC RBC-ENTMCNC: 31.4 G/DL — LOW (ref 32–37)
MCV RBC AUTO: 84.2 FL — SIGNIFICANT CHANGE UP (ref 81–99)
MONOCYTES # BLD AUTO: 0.39 K/UL — SIGNIFICANT CHANGE UP (ref 0.1–0.6)
MONOCYTES NFR BLD AUTO: 11.2 % — HIGH (ref 1.7–9.3)
NEUTROPHILS # BLD AUTO: 1.51 K/UL — SIGNIFICANT CHANGE UP (ref 1.4–6.5)
NEUTROPHILS NFR BLD AUTO: 43.2 % — SIGNIFICANT CHANGE UP (ref 42.2–75.2)
NRBC # BLD: 0 /100 WBCS — SIGNIFICANT CHANGE UP (ref 0–0)
PLATELET # BLD AUTO: 403 K/UL — HIGH (ref 130–400)
POTASSIUM SERPL-MCNC: 4.3 MMOL/L — SIGNIFICANT CHANGE UP (ref 3.5–5)
POTASSIUM SERPL-SCNC: 4.3 MMOL/L — SIGNIFICANT CHANGE UP (ref 3.5–5)
PROT SERPL-MCNC: 6.9 G/DL — SIGNIFICANT CHANGE UP (ref 6–8)
RBC # BLD: 4.05 M/UL — LOW (ref 4.2–5.4)
RBC # FLD: 14.5 % — SIGNIFICANT CHANGE UP (ref 11.5–14.5)
SODIUM SERPL-SCNC: 138 MMOL/L — SIGNIFICANT CHANGE UP (ref 135–146)
WBC # BLD: 3.49 K/UL — LOW (ref 4.8–10.8)
WBC # FLD AUTO: 3.49 K/UL — LOW (ref 4.8–10.8)

## 2022-01-05 PROCEDURE — 93010 ELECTROCARDIOGRAM REPORT: CPT

## 2022-01-05 PROCEDURE — 99232 SBSQ HOSP IP/OBS MODERATE 35: CPT

## 2022-01-05 PROCEDURE — 99233 SBSQ HOSP IP/OBS HIGH 50: CPT

## 2022-01-05 RX ORDER — ESCITALOPRAM OXALATE 10 MG/1
1 TABLET, FILM COATED ORAL
Qty: 30 | Refills: 1
Start: 2022-01-05 | End: 2022-03-05

## 2022-01-05 RX ADMIN — Medication 81 MILLIGRAM(S): at 11:21

## 2022-01-05 RX ADMIN — ESCITALOPRAM OXALATE 5 MILLIGRAM(S): 10 TABLET, FILM COATED ORAL at 11:20

## 2022-01-05 RX ADMIN — SODIUM CHLORIDE 100 MILLILITER(S): 9 INJECTION INTRAMUSCULAR; INTRAVENOUS; SUBCUTANEOUS at 00:43

## 2022-01-05 RX ADMIN — Medication 13 UNIT(S): at 11:23

## 2022-01-05 RX ADMIN — INSULIN GLARGINE 40 UNIT(S): 100 INJECTION, SOLUTION SUBCUTANEOUS at 11:24

## 2022-01-05 RX ADMIN — Medication 1 APPLICATION(S): at 05:05

## 2022-01-05 RX ADMIN — Medication 1 APPLICATION(S): at 17:37

## 2022-01-05 RX ADMIN — Medication 25 MILLIGRAM(S): at 17:34

## 2022-01-05 RX ADMIN — ENOXAPARIN SODIUM 40 MILLIGRAM(S): 100 INJECTION SUBCUTANEOUS at 11:20

## 2022-01-05 RX ADMIN — Medication 25 MILLIGRAM(S): at 05:04

## 2022-01-05 RX ADMIN — FAMOTIDINE 40 MILLIGRAM(S): 10 INJECTION INTRAVENOUS at 05:04

## 2022-01-05 RX ADMIN — Medication 13 UNIT(S): at 08:09

## 2022-01-05 RX ADMIN — ESCITALOPRAM OXALATE 10 MILLIGRAM(S): 10 TABLET, FILM COATED ORAL at 11:20

## 2022-01-05 RX ADMIN — POLYETHYLENE GLYCOL 3350 17 GRAM(S): 17 POWDER, FOR SOLUTION ORAL at 11:19

## 2022-01-05 RX ADMIN — FAMOTIDINE 40 MILLIGRAM(S): 10 INJECTION INTRAVENOUS at 17:34

## 2022-01-05 RX ADMIN — Medication 13 UNIT(S): at 17:34

## 2022-01-05 RX ADMIN — CHLORHEXIDINE GLUCONATE 1 APPLICATION(S): 213 SOLUTION TOPICAL at 11:19

## 2022-01-05 RX ADMIN — Medication 75 MILLIGRAM(S): at 21:41

## 2022-01-05 NOTE — DIETITIAN INITIAL EVALUATION ADULT. - OTHER INFO
Pt is a 37 y/o F with PMHx Type I DM on an insulin pump, complicated by Diabetic neuropathy and vulvodynia, HTN, chronic back pain, migraine headaches,  presents with lower quadrant abdominal pain, nausea and generalized weakness for 3 days in the setting of elevated sugars, elevated beta-hydroxybuturate, mild GAP, all concerning for DKA, now resolved, was waiting for d/c upon arrival of new insulin pump but became COVID + while waiting.    # COVID-19 PNA  # Abdominal Pain, resolved  # Uptrending LFTs, resolved  # Diabetic Ketoacidosis, resolved  # Anxiety Disorder   # Sinus Tachycardia, resolved  # Abdominal Pain   # Uptrending LFTs  - follow up outpatient with GI For MRI of liver lesions and colonoscopy to r/o ibd UBW 81.6 kg from previous admission taken on 6/1/2021.    Subjective  Reports ongoing abdominal pain. Also endorses persistent diarrhea with "a lot" of episodes. Denies n/v. Reports 75% tray intake on average.     Pt is a 37 y/o F with PMHx Type I DM on an insulin pump, complicated by Diabetic neuropathy and vulvodynia, HTN, chronic back pain, migraine headaches,  presents with lower quadrant abdominal pain, nausea and generalized weakness for 3 days in the setting of elevated sugars, elevated beta-hydroxybuturate, mild GAP, all concerning for DKA, now resolved, was waiting for d/c upon arrival of new insulin pump but became COVID + while waiting.    # COVID-19 PNA  # Abdominal Pain, resolved  # Uptrending LFTs, resolved  # Diabetic Ketoacidosis, resolved  # Anxiety Disorder   # Sinus Tachycardia, resolved  # Abdominal Pain   # Uptrending LFTs  - follow up outpatient with GI For MRI of liver lesions and colonoscopy to r/o ibd UBW 81.6 kg from previous admission taken on 6/1/2021.    Subjective  Reports ongoing abdominal pain. Also endorses persistent diarrhea with "a lot" of episodes. Denies n/v. Reports 75% tray intake on average.     Pt is a 37 y/o F with PMHx Type I DM on an insulin pump, complicated by Diabetic neuropathy and vulvodynia, HTN, chronic back pain, migraine headaches,  presents with lower quadrant abdominal pain, nausea and generalized weakness for 3 days in the setting of elevated sugars, elevated beta-hydroxybuturate, mild GAP, all concerning for DKA, now resolved, was waiting for d/c upon arrival of new insulin pump but became COVID + while waiting.    # COVID-19 PNA  # Abdominal Pain, resolved  # Uptrending LFTs, resolved  # Diabetic Ketoacidosis, resolved  # Anxiety Disorder   # Sinus Tachycardia, resolved  # Abdominal Pain   # Uptrending LFTs  - follow up outpatient with GI For MRI of liver lesions and colonoscopy to r/o ibd    Endocrinology 1/4:   - continue Lantus 40 units in am and agree with decreasing Lantus to 12 units at bedtime if any hypoglycemia stop bedtime lantus   - Continue lispro 13 units TIDAC   - change ISS to 2:50 >150 from 1:50 >150 TIDAC

## 2022-01-05 NOTE — PROGRESS NOTE ADULT - SUBJECTIVE AND OBJECTIVE BOX
CRISTI MONTGOMERY  36y Female    INTERVAL HPI/OVERNIGHT EVENTS:    Pt c/o abdominal bloating, diarrhea (occasionally with blood)  did not eat much breakfast  she contacted Dr. Mejia today  no fever  no SOB    T(F): 98.3 (01-05-22 @ 07:30), Max: 98.5 (01-04-22 @ 15:20)  HR: 95 (01-05-22 @ 07:30) (88 - 99)  BP: 152/74 (01-05-22 @ 07:30) (134/71 - 153/70)  RR: 18 (01-05-22 @ 07:30) (18 - 19)  SpO2: 99% (01-05-22 @ 07:30) (99% - 99%) on RA  I&O's Summary    04 Jan 2022 07:01  -  05 Jan 2022 07:00  --------------------------------------------------------  IN: 1110 mL / OUT: 0 mL / NET: 1110 mL    05 Jan 2022 07:01  -  05 Jan 2022 12:26  --------------------------------------------------------  IN: 200 mL / OUT: 0 mL / NET: 200 mL      CAPILLARY BLOOD GLUCOSE      POCT Blood Glucose.: 137 mg/dL (05 Jan 2022 11:19)  POCT Blood Glucose.: 103 mg/dL (05 Jan 2022 08:04)  POCT Blood Glucose.: 116 mg/dL (04 Jan 2022 21:23)  POCT Blood Glucose.: 253 mg/dL (04 Jan 2022 16:52)  POCT Blood Glucose.: 263 mg/dL (04 Jan 2022 12:28)        PHYSICAL EXAM:  GENERAL: NAD  HEAD:  Normocephalic  EYES:  conjunctiva and sclera clear  ENMT: Moist mucous membranes  NERVOUS SYSTEM:  Alert, awake, Good concentration  CHEST/LUNG: CTA b/l  HEART: Regular rate and rhythm  ABDOMEN: Soft, Nontender, distended; Bowel sounds present  EXTREMITIES:   No edema    Consultant(s) Notes Reviewed:  [x ] YES  [ ] NO  Care Discussed with Consultants/Other Providers [ x] YES  [ ] NO    MEDICATIONS  (STANDING):  amitriptyline Oral Tab/Cap - Peds 75 milliGRAM(s) Oral at bedtime  ammonium lactate 12% Lotion 1 Application(s) Topical every 12 hours  aspirin  chewable 81 milliGRAM(s) Oral daily  chlorhexidine 4% Liquid 1 Application(s) Topical daily  enoxaparin Injectable 40 milliGRAM(s) SubCutaneous daily  escitalopram 10 milliGRAM(s) Oral daily  escitalopram 5 milliGRAM(s) Oral daily  famotidine    Tablet 40 milliGRAM(s) Oral two times a day  insulin glargine Injectable (LANTUS) 40 Unit(s) SubCutaneous every morning  insulin glargine Injectable (LANTUS) 12 Unit(s) SubCutaneous at bedtime  insulin lispro (ADMELOG) corrective regimen sliding scale   SubCutaneous three times a day before meals  insulin lispro Injectable (ADMELOG) 13 Unit(s) SubCutaneous three times a day before meals  metoprolol tartrate 25 milliGRAM(s) Oral two times a day  polyethylene glycol 3350 17 Gram(s) Oral daily  senna 2 Tablet(s) Oral at bedtime  sodium chloride 0.9%. 1000 milliLiter(s) (100 mL/Hr) IV Continuous <Continuous>    MEDICATIONS  (PRN):  acetaminophen     Tablet .. 650 milliGRAM(s) Oral every 6 hours PRN Moderate Pain (4 - 6)  aluminum hydroxide/magnesium hydroxide/simethicone Suspension 30 milliLiter(s) Oral every 4 hours PRN Dyspepsia  benzonatate 100 milliGRAM(s) Oral three times a day PRN Cough  bisacodyl Suppository 10 milliGRAM(s) Rectal daily PRN Constipation  cyclobenzaprine 10 milliGRAM(s) Oral three times a day PRN Muscle Spasm  guaifenesin/dextromethorphan Oral Liquid 10 milliLiter(s) Oral every 4 hours PRN Cough      LABS:                        10.7   3.49  )-----------( 403      ( 05 Jan 2022 04:30 )             34.1     01-05    138  |  103  |  5<L>  ----------------------------<  138<H>  4.3   |  20  |  0.5<L>    Ca    8.8      05 Jan 2022 04:30  Mg     2.0     01-05    TPro  6.9  /  Alb  3.9  /  TBili  0.3  /  DBili  x   /  AST  59<H>  /  ALT  192<H>  /  AlkPhos  125<H>  01-05          COVID-19 PCR: Detected (04 Jan 2022 15:21)  COVID-19 PCR: Detected (31 Dec 2021 21:46)  COVID-19 PCR: NotDetec (28 Dec 2021 09:46)  COVID-19 PCR: NotDetec (23 Dec 2021 09:56)  COVID-19 PCR: NotDetec (29 Nov 2021 10:00)  COVID-19 PCR: NotDetec (19 Nov 2021 10:00)  SARS-CoV-2: NotDetec (13 Nov 2021 13:25)  COVID-19 PCR: NotDetec (03 Oct 2021 14:45)    Ferritin, Serum: 9 ng/mL (12-23-21 @ 16:52)      D-Dimer Assay, Quantitative: 236 ng/mL DDU (01-02-22 @ 13:20)    Procalcitonin, Serum: 0.47 ng/mL (12-31-21 @ 22:44)      RADIOLOGY & ADDITIONAL TESTS:    Imaging or report Personally Reviewed:  [x ] YES  [ ] NO    < from: US Abdomen Upper Quadrant Right (01.03.22 @ 17:41) >  IMPRESSION:    Normal right upper quadrant abdominal ultrasound.    < end of copied text >  < from: Xray Chest 1 View- PORTABLE-Urgent (Xray Chest 1 View- PORTABLE-Urgent .) (12.31.21 @ 21:51) >  Impression:    No radiographic evidence of acute cardiopulmonary disease.    < end of copied text >            Case discussed with residents and RN on rounds today    Care discussed with pt

## 2022-01-05 NOTE — PROGRESS NOTE ADULT - ASSESSMENT
35 y/o woman with PMH of Type I DM on insulin pump, complicated by Diabetic neuropathy and vulvodynia, HTN, chronic back pain and migraine headaches presents with lower quadrant abdominal pain, nausea and generalized weakness for 3 days in the setting of elevated sugars, elevated beta-hydroxybuturate, mild HAGMA, all concerning for DKA which is now resolved. She was waiting for new insulin pump and then became COVID + during this time.     1. COVID 19  - PCR positive 12/31 and 1/4  - 1 episode of high grade fever  - was started on dexamethasone but ID said not necessary  - s/p remdesivir 200mg x1 - now stopped  - on RA and pulse ox is acceptable  - asymptomatic   - continue isolation per protocol    2. Intermittent episodes of abdominal Pain, diarrhea (sometimes with some blood) and constipation  - CT Abdomen and Pelvis w/ IV Cont 12/2/21:  No acute intra-abdominal or pelvic pathology is identified  - seen by GI earlier this admission  - miralax and senna  - follows with Dr. Mejia  - tolerating current diet  - outpt f/u for EGD and colonoscopy per GI - need to rule out IBD    3. Transaminitis  - now resolving  - remdesivir stopped  - s/p CLD Work up   - RUQ US normal  - monitor LFTs  - s/p recent MRI of abdomen 12/16     4. DKA now resolved  - on lantus 40 units AM, 12 units bedtime, lispro 13 units before meals  - Endocrine f/u appreciated  - awaiting insulin pump    5. Anxiety Disorder   - started on lexapro 5 mg per psychiatry and increased to 10mg daily    6. Sinus Tachycardia, resolved  - c/w metoprolol     7. DVT ppx: lovenox 40mg subQ daily      PROGRESS NOTE HANDOFF    Pending: insulin pump    pt aware of the plan of care    Disposition: group home

## 2022-01-05 NOTE — PROGRESS NOTE ADULT - SUBJECTIVE AND OBJECTIVE BOX
CRISTI MONTGOMERY 36y Female  MRN#: [id number]     SUBJECTIVE  Patient is a 36y old Female who presents with a chief complaint of Mild DKA (05 Jan 2022 11:23)    Interval/Overnight Events:    Today is hospital day 13d, and this morning she is lying in bed without distress.   No acute overnight events.     OBJECTIVE  PAST MEDICAL & SURGICAL HISTORY  Neuropathy  right lower extremity, vaginal    Type 1 diabetes    Degenerative disc disease, thoracic    Urinary tract infection, recurrent    Gastroesophageal reflux disease, esophagitis presence not specified    Intractable headache, unspecified chronicity pattern, unspecified headache type    Major depressive disorder with single episode, in full remission  previously treated with zyprexa, celexa and lexapro    Tremor of right hand    Tachycardia    Spinal stenosis    No significant past surgical history      ALLERGIES:  amoxicillin (Unknown)  Ceclor (Rash)  ciprofloxacin (Unknown)  clindamycin (Unknown)  flu vaccines (Other)  gabapentin (Unknown)  Influenza Virus Vaccine, H5N1, Inactivated (Unknown)  penicillins (Unknown)    MEDICATIONS:  STANDING MEDICATIONS  amitriptyline Oral Tab/Cap - Peds 75 milliGRAM(s) Oral at bedtime  ammonium lactate 12% Lotion 1 Application(s) Topical every 12 hours  aspirin  chewable 81 milliGRAM(s) Oral daily  chlorhexidine 4% Liquid 1 Application(s) Topical daily  enoxaparin Injectable 40 milliGRAM(s) SubCutaneous daily  escitalopram 10 milliGRAM(s) Oral daily  escitalopram 5 milliGRAM(s) Oral daily  famotidine    Tablet 40 milliGRAM(s) Oral two times a day  insulin glargine Injectable (LANTUS) 40 Unit(s) SubCutaneous every morning  insulin glargine Injectable (LANTUS) 12 Unit(s) SubCutaneous at bedtime  insulin lispro (ADMELOG) corrective regimen sliding scale   SubCutaneous three times a day before meals  insulin lispro Injectable (ADMELOG) 13 Unit(s) SubCutaneous three times a day before meals  metoprolol tartrate 25 milliGRAM(s) Oral two times a day  polyethylene glycol 3350 17 Gram(s) Oral daily  senna 2 Tablet(s) Oral at bedtime  sodium chloride 0.9%. 1000 milliLiter(s) IV Continuous <Continuous>    PRN MEDICATIONS  acetaminophen     Tablet .. 650 milliGRAM(s) Oral every 6 hours PRN  aluminum hydroxide/magnesium hydroxide/simethicone Suspension 30 milliLiter(s) Oral every 4 hours PRN  benzonatate 100 milliGRAM(s) Oral three times a day PRN  bisacodyl Suppository 10 milliGRAM(s) Rectal daily PRN  cyclobenzaprine 10 milliGRAM(s) Oral three times a day PRN  guaifenesin/dextromethorphan Oral Liquid 10 milliLiter(s) Oral every 4 hours PRN    HOME MEDICATIONS  Home Medications:  amitriptyline 75 mg oral tablet: 1 tab(s) orally once a day (at bedtime) (04 Nov 2021 15:06)  ammonium lactate 12% topical cream: Apply topically to affected area 2 times a day (23 Dec 2021 13:53)  aspirin 81 mg oral tablet, chewable: 1 tab(s) orally once a day (28 Dec 2021 09:29)  cyclobenzaprine 10 mg oral tablet: 1 tab(s) orally once a day (at bedtime) (28 Dec 2021 08:33)  metoprolol tartrate 25 mg oral tablet: 1 tab(s) orally 2 times a day (04 Nov 2021 15:06)  multivitamin: 1 tab(s) orally once a day (04 Nov 2021 15:06)      LABS:                        10.7   3.49  )-----------( 403      ( 05 Jan 2022 04:30 )             34.1     01-05    138  |  103  |  5<L>  ----------------------------<  138<H>  4.3   |  20  |  0.5<L>    Ca    8.8      05 Jan 2022 04:30  Mg     2.0     01-05    TPro  6.9  /  Alb  3.9  /  TBili  0.3  /  DBili  x   /  AST  59<H>  /  ALT  192<H>  /  AlkPhos  125<H>  01-05    LIVER FUNCTIONS - ( 05 Jan 2022 04:30 )  Alb: 3.9 g/dL / Pro: 6.9 g/dL / ALK PHOS: 125 U/L / ALT: 192 U/L / AST: 59 U/L / GGT: x                         CAPILLARY BLOOD GLUCOSE      POCT Blood Glucose.: 137 mg/dL (05 Jan 2022 11:19)      PHYSICAL EXAM:  T(C): 36.8 (01-05-22 @ 07:30), Max: 36.9 (01-04-22 @ 15:20)  HR: 95 (01-05-22 @ 07:30) (88 - 99)  BP: 152/74 (01-05-22 @ 07:30) (134/71 - 153/70)  RR: 18 (01-05-22 @ 07:30) (18 - 19)  SpO2: 99% (01-05-22 @ 07:30) (99% - 99%)    GENERAL: NAD, well-developed, 36y  EENT: EOMI, conjunctiva and sclera clear, No nasal obstruction or discharge  RESPIRATORY: Clear to auscultation bilaterally; No wheeze or crackles  CARDIOVASCULAR: Regular rate and rhythm; No murmurs, rubs, or gallops, no pitting edema  GASTROINTESTINAL: Abdomen Soft, Nontender, Nondistended, +BS  MUSCULOSKELETAL:  No cyanosis, extremities grossly symmetrical  PSYCH: AAOx3, affect appropriate  NEUROLOGY: non-focal, cognition grossly intact, MAEE    ADMISSION SUMMARY  Patient is a 36y old Female who presents with a chief complaint of Mild DKA (05 Jan 2022 11:23)

## 2022-01-05 NOTE — PROGRESS NOTE ADULT - ASSESSMENT
35 y/o F with PMHx Type I DM on an insulin pump, complicated by Diabetic neuropathy and vulvodynia, HTN, chronic back pain, migraine headaches,  presented with  DKA, now resolved, was waiting for d/c upon arrival of new insulin pump but became COVID + required steroids , now off .   patient well known to me from outpatient setting , DKA likely because of pump malfunctioning , patient unable to take insulin by pen because of policy where she lives ( can not take injection and there is not nurse on site 24 hours ).  - prescription for new pump sent to [facility] and will be delivered to the hospital ,  will arrange her a virtual training to be trained on new pump . will keep same settings as her old pump (all in allscripts )   - FS reviewed   - continue Lantus 40 units in am and  Lantus to 12 units at bedtime if any hypoglycemia stop bedtime lantus   - Continue lispro 13 units TIDAC   - continue 1:50 >150 TIDAC   - discussed with dr Guerrero , and      will follow

## 2022-01-05 NOTE — DIETITIAN INITIAL EVALUATION ADULT. - ORAL INTAKE PTA/DIET HISTORY
UBW 81.6 kg from previous admission taken on 6/1/2021. Pt greeted at bedside and detailed hx obtained via room phone [phone]. Pt reports good PO intake PTA with stable weight. UBW 170lbs. Follows DM diet with 60g CHO at 3 meals and 1-2 snacks daily of 25g CHO. Only recent change to her insulin regimen is switch from insulin pump to injection since her pump malfunctioned. Before pump malfunction, she was able to maintain her BG at 100-130 with target of 120. No food allergy/intolerance. No supplement use. No chewing/swallowing issues. Pt greeted at bedside and detailed hx obtained via room phone [phone]. Pt reports good PO intake PTA with stable weight. UBW 170lbs. Follows DM diet with 60g CHO at 3 meals and 1-2 snacks daily of 25g CHO. Only recent change to her insulin regimen was switching from insulin pump to injection since her pump malfunctioned. Before pump malfunction, she was able to maintain her BG at 100-130 with target of 120. No food allergy/intolerance. No supplement use. No chewing/swallowing issues.

## 2022-01-05 NOTE — DIETITIAN INITIAL EVALUATION ADULT. - PERTINENT MEDS FT
NS@100ml/hr, lantus 40U in AM, lantus 12U at HS, SSI lispro, maalox, senna, dulcolax, miralax, pepcid

## 2022-01-05 NOTE — DIETITIAN INITIAL EVALUATION ADULT. - PERTINENT LABORATORY DATA
(1/8) WBC 3.49, Na 138, K 4.3, Cl 103, Co2 20, BUN 5, creat 0.5, gluc 138, Mag 2.0    CAPILLARY BLOOD GLUCOSE  POCT Blood Glucose.: 137 mg/dL (05 Jan 2022 11:19)  POCT Blood Glucose.: 103 mg/dL (05 Jan 2022 08:04)  POCT Blood Glucose.: 116 mg/dL (04 Jan 2022 21:23)  POCT Blood Glucose.: 253 mg/dL (04 Jan 2022 16:52)  POCT Blood Glucose.: 263 mg/dL (04 Jan 2022 12:28)  POCT Blood Glucose.: 290 mg/dL (04 Jan 2022 11:27)    A1C 8.6 (12/25/21) (1/8) WBC 3.49, Na 138, K 4.3, Cl 103, Co2 20, BUN 5, creat 0.5, gluc 138, Mag 2.0    CAPILLARY BLOOD GLUCOSE  POCT Blood Glucose.: 137 mg/dL (05 Jan 2022 11:19)  POCT Blood Glucose.: 103 mg/dL (05 Jan 2022 08:04)  POCT Blood Glucose.: 116 mg/dL (04 Jan 2022 21:23)  POCT Blood Glucose.: 253 mg/dL (04 Jan 2022 16:52)  POCT Blood Glucose.: 263 mg/dL (04 Jan 2022 12:28)  POCT Blood Glucose.: 290 mg/dL (04 Jan 2022 11:27)    A1C 8.6 (12/25/21)    Depressed BUN more likely related to medical condition and less likely nutritional.

## 2022-01-05 NOTE — PROGRESS NOTE ADULT - ASSESSMENT
37 y/o F with PMHx Type I DM on an insulin pump, complicated by Diabetic neuropathy and vulvodynia, HTN, chronic back pain, migraine headaches,  presents with lower quadrant abdominal pain, nausea and generalized weakness for 3 days in the setting of elevated sugars, elevated beta-hydroxybuturate, mild GAP, all concerning for DKA, now resolved, was waiting for d/c upon arrival of new insulin pump but became COVID + while waiting     # COVID-19 PNA  - PCR positive  - 1 episode of high grade fever  - on dexamethasone 6mg q24h (day 2 of 10)  - s/p remdesivir 200mg x1  - ID consulted  - course complicated by rising LFTs, AST/ALT: 310/436  - stopped RDV, steroids as per ID. LFTs trending down  - c/w Robitussin 10mL Q4 PRN Cough  - c/w Benzonatate 100mg Oral three times a day PRN Cough  - wean 02 as titrated    # Abdominal Pain, resolved  # Uptrending LFTs, resolved  -CT Abdomen and Pelvis w/ IV Cont 12/2/21:  No acute intra-abdominal or pelvic pathology is identified  - CLD Work up: completed, all labs collected   - LFTs uptrending, stopped RDV, steroids as per ID  - blood cultures 12/31 NGTD  - RUQ US no significant findings  - monitor LFTs  - follow up outpatient with GI For MRI of liver lesions and colonoscopy to r/o ibd     # Diabetic Ketoacidosis, resolved  - patient treated in the ICU for DKA, now resolved  - episode of hypoglycemia, tx w/ dextrose, lispro decreased  - insulin lantus 40 units AM, 12 units bedtime, lispro 13 units before meals, SS  - followed by Endocrinology, previously on insulin pump  - Insulin pump prescription sent to [facility] as per endocrine     # Anxiety Disorder   - patient with hx of anxiety- started on lexapro 5 mg per psychiatry   - requests xanax occasionally    # Sinus Tachycardia, resolved  - c/w metoprolol     # Abdominal Pain   # Uptrending LFTs  -CT Abdomen and Pelvis w/ IV Cont 12/2/21:  No acute intra-abdominal or pelvic pathology is identified  - CLD Work up: completed, all labs collected   - RUQ US: no significant findings  - follow up outpatient with GI For MRI of liver lesions and colonoscopy to r/o ibd     Misc:   # DVT ppx: lovenox 40mg subQ daily  # GI ppx: N/A  # Diet: DASH/TLC & CC  # Activity: AAT  # CODE: Full Code  # Dispo: discharge planning

## 2022-01-05 NOTE — PROGRESS NOTE ADULT - SUBJECTIVE AND OBJECTIVE BOX
S: patient seen this pm, feels ok except for diarrhea   O:Vital Signs Last 24 Hrs  T(C): 36.8 (05 Jan 2022 07:30), Max: 36.9 (04 Jan 2022 15:20)  T(F): 98.3 (05 Jan 2022 07:30), Max: 98.5 (04 Jan 2022 15:20)  HR: 95 (05 Jan 2022 07:30) (88 - 99)  BP: 152/74 (05 Jan 2022 07:30) (134/71 - 153/70)  BP(mean): --  RR: 18 (05 Jan 2022 07:30) (18 - 19)  SpO2: 99% (05 Jan 2022 07:30) (99% - 99%)      MEDICATIONS  (STANDING):  amitriptyline Oral Tab/Cap - Peds 75 milliGRAM(s) Oral at bedtime  ammonium lactate 12% Lotion 1 Application(s) Topical every 12 hours  aspirin  chewable 81 milliGRAM(s) Oral daily  chlorhexidine 4% Liquid 1 Application(s) Topical daily  enoxaparin Injectable 40 milliGRAM(s) SubCutaneous daily  escitalopram 10 milliGRAM(s) Oral daily  famotidine    Tablet 40 milliGRAM(s) Oral two times a day  insulin glargine Injectable (LANTUS) 40 Unit(s) SubCutaneous every morning  insulin glargine Injectable (LANTUS) 12 Unit(s) SubCutaneous at bedtime  insulin lispro (ADMELOG) corrective regimen sliding scale   SubCutaneous three times a day before meals  insulin lispro Injectable (ADMELOG) 13 Unit(s) SubCutaneous three times a day before meals  metoprolol tartrate 25 milliGRAM(s) Oral two times a day  polyethylene glycol 3350 17 Gram(s) Oral daily  senna 2 Tablet(s) Oral at bedtime  sodium chloride 0.9%. 1000 milliLiter(s) (100 mL/Hr) IV Continuous <Continuous>    MEDICATIONS  (PRN):  acetaminophen     Tablet .. 650 milliGRAM(s) Oral every 6 hours PRN Moderate Pain (4 - 6)  aluminum hydroxide/magnesium hydroxide/simethicone Suspension 30 milliLiter(s) Oral every 4 hours PRN Dyspepsia  benzonatate 100 milliGRAM(s) Oral three times a day PRN Cough  bisacodyl Suppository 10 milliGRAM(s) Rectal daily PRN Constipation  cyclobenzaprine 10 milliGRAM(s) Oral three times a day PRN Muscle Spasm  guaifenesin/dextromethorphan Oral Liquid 10 milliLiter(s) Oral every 4 hours PRN Cough      01-05    138  |  103  |  5<L>  ----------------------------<  138<H>  4.3   |  20  |  0.5<L>    Ca    8.8      05 Jan 2022 04:30  Mg     2.0     01-05    TPro  6.9  /  Alb  3.9  /  TBili  0.3  /  DBili  x   /  AST  59<H>  /  ALT  192<H>  /  AlkPhos  125<H>  01-05

## 2022-01-06 ENCOUNTER — APPOINTMENT (OUTPATIENT)
Dept: INTERNAL MEDICINE | Facility: CLINIC | Age: 37
End: 2022-01-06

## 2022-01-06 LAB
ALBUMIN SERPL ELPH-MCNC: 4.3 G/DL — SIGNIFICANT CHANGE UP (ref 3.5–5.2)
ALP SERPL-CCNC: 135 U/L — HIGH (ref 30–115)
ALT FLD-CCNC: 177 U/L — HIGH (ref 0–41)
ANION GAP SERPL CALC-SCNC: 18 MMOL/L — HIGH (ref 7–14)
AST SERPL-CCNC: 61 U/L — HIGH (ref 0–41)
BASOPHILS # BLD AUTO: 0.02 K/UL — SIGNIFICANT CHANGE UP (ref 0–0.2)
BASOPHILS NFR BLD AUTO: 0.5 % — SIGNIFICANT CHANGE UP (ref 0–1)
BILIRUB SERPL-MCNC: 0.3 MG/DL — SIGNIFICANT CHANGE UP (ref 0.2–1.2)
BUN SERPL-MCNC: 3 MG/DL — LOW (ref 10–20)
CALCIUM SERPL-MCNC: 9.5 MG/DL — SIGNIFICANT CHANGE UP (ref 8.5–10.1)
CHLORIDE SERPL-SCNC: 103 MMOL/L — SIGNIFICANT CHANGE UP (ref 98–110)
CO2 SERPL-SCNC: 19 MMOL/L — SIGNIFICANT CHANGE UP (ref 17–32)
CREAT SERPL-MCNC: 0.5 MG/DL — LOW (ref 0.7–1.5)
CULTURE RESULTS: SIGNIFICANT CHANGE UP
CULTURE RESULTS: SIGNIFICANT CHANGE UP
EOSINOPHIL # BLD AUTO: 0.02 K/UL — SIGNIFICANT CHANGE UP (ref 0–0.7)
EOSINOPHIL NFR BLD AUTO: 0.5 % — SIGNIFICANT CHANGE UP (ref 0–8)
GLUCOSE BLDC GLUCOMTR-MCNC: 107 MG/DL — HIGH (ref 70–99)
GLUCOSE BLDC GLUCOMTR-MCNC: 117 MG/DL — HIGH (ref 70–99)
GLUCOSE BLDC GLUCOMTR-MCNC: 213 MG/DL — HIGH (ref 70–99)
GLUCOSE BLDC GLUCOMTR-MCNC: 216 MG/DL — HIGH (ref 70–99)
GLUCOSE SERPL-MCNC: 132 MG/DL — HIGH (ref 70–99)
HCT VFR BLD CALC: 36.8 % — LOW (ref 37–47)
HGB BLD-MCNC: 11.6 G/DL — LOW (ref 12–16)
IMM GRANULOCYTES NFR BLD AUTO: 0.5 % — HIGH (ref 0.1–0.3)
LYMPHOCYTES # BLD AUTO: 1.59 K/UL — SIGNIFICANT CHANGE UP (ref 1.2–3.4)
LYMPHOCYTES # BLD AUTO: 43.4 % — SIGNIFICANT CHANGE UP (ref 20.5–51.1)
MAGNESIUM SERPL-MCNC: 1.9 MG/DL — SIGNIFICANT CHANGE UP (ref 1.8–2.4)
MCHC RBC-ENTMCNC: 26.3 PG — LOW (ref 27–31)
MCHC RBC-ENTMCNC: 31.5 G/DL — LOW (ref 32–37)
MCV RBC AUTO: 83.4 FL — SIGNIFICANT CHANGE UP (ref 81–99)
MONOCYTES # BLD AUTO: 0.4 K/UL — SIGNIFICANT CHANGE UP (ref 0.1–0.6)
MONOCYTES NFR BLD AUTO: 10.9 % — HIGH (ref 1.7–9.3)
NEUTROPHILS # BLD AUTO: 1.61 K/UL — SIGNIFICANT CHANGE UP (ref 1.4–6.5)
NEUTROPHILS NFR BLD AUTO: 44.2 % — SIGNIFICANT CHANGE UP (ref 42.2–75.2)
NRBC # BLD: 0 /100 WBCS — SIGNIFICANT CHANGE UP (ref 0–0)
PLATELET # BLD AUTO: 438 K/UL — HIGH (ref 130–400)
POTASSIUM SERPL-MCNC: 4.7 MMOL/L — SIGNIFICANT CHANGE UP (ref 3.5–5)
POTASSIUM SERPL-SCNC: 4.7 MMOL/L — SIGNIFICANT CHANGE UP (ref 3.5–5)
PROT SERPL-MCNC: 7.7 G/DL — SIGNIFICANT CHANGE UP (ref 6–8)
RBC # BLD: 4.41 M/UL — SIGNIFICANT CHANGE UP (ref 4.2–5.4)
RBC # FLD: 14.4 % — SIGNIFICANT CHANGE UP (ref 11.5–14.5)
SODIUM SERPL-SCNC: 140 MMOL/L — SIGNIFICANT CHANGE UP (ref 135–146)
SPECIMEN SOURCE: SIGNIFICANT CHANGE UP
SPECIMEN SOURCE: SIGNIFICANT CHANGE UP
WBC # BLD: 3.66 K/UL — LOW (ref 4.8–10.8)
WBC # FLD AUTO: 3.66 K/UL — LOW (ref 4.8–10.8)

## 2022-01-06 PROCEDURE — 99233 SBSQ HOSP IP/OBS HIGH 50: CPT

## 2022-01-06 PROCEDURE — 99232 SBSQ HOSP IP/OBS MODERATE 35: CPT

## 2022-01-06 RX ADMIN — Medication 13 UNIT(S): at 11:20

## 2022-01-06 RX ADMIN — SODIUM CHLORIDE 100 MILLILITER(S): 9 INJECTION INTRAMUSCULAR; INTRAVENOUS; SUBCUTANEOUS at 02:10

## 2022-01-06 RX ADMIN — CYCLOBENZAPRINE HYDROCHLORIDE 10 MILLIGRAM(S): 10 TABLET, FILM COATED ORAL at 21:46

## 2022-01-06 RX ADMIN — ESCITALOPRAM OXALATE 10 MILLIGRAM(S): 10 TABLET, FILM COATED ORAL at 11:19

## 2022-01-06 RX ADMIN — INSULIN GLARGINE 40 UNIT(S): 100 INJECTION, SOLUTION SUBCUTANEOUS at 08:03

## 2022-01-06 RX ADMIN — ENOXAPARIN SODIUM 40 MILLIGRAM(S): 100 INJECTION SUBCUTANEOUS at 11:19

## 2022-01-06 RX ADMIN — Medication 25 MILLIGRAM(S): at 17:21

## 2022-01-06 RX ADMIN — Medication 75 MILLIGRAM(S): at 21:40

## 2022-01-06 RX ADMIN — FAMOTIDINE 40 MILLIGRAM(S): 10 INJECTION INTRAVENOUS at 17:22

## 2022-01-06 RX ADMIN — Medication 1 APPLICATION(S): at 05:43

## 2022-01-06 RX ADMIN — Medication 2: at 11:19

## 2022-01-06 RX ADMIN — Medication 13 UNIT(S): at 17:21

## 2022-01-06 RX ADMIN — Medication 30 MILLILITER(S): at 21:43

## 2022-01-06 RX ADMIN — INSULIN GLARGINE 12 UNIT(S): 100 INJECTION, SOLUTION SUBCUTANEOUS at 21:40

## 2022-01-06 RX ADMIN — FAMOTIDINE 40 MILLIGRAM(S): 10 INJECTION INTRAVENOUS at 05:41

## 2022-01-06 RX ADMIN — POLYETHYLENE GLYCOL 3350 17 GRAM(S): 17 POWDER, FOR SOLUTION ORAL at 11:21

## 2022-01-06 RX ADMIN — Medication 2: at 17:21

## 2022-01-06 RX ADMIN — Medication 1 APPLICATION(S): at 17:22

## 2022-01-06 RX ADMIN — Medication 25 MILLIGRAM(S): at 05:41

## 2022-01-06 RX ADMIN — Medication 13 UNIT(S): at 08:02

## 2022-01-06 RX ADMIN — Medication 81 MILLIGRAM(S): at 11:18

## 2022-01-06 NOTE — PROGRESS NOTE ADULT - SUBJECTIVE AND OBJECTIVE BOX
CRISTI MONTGOMERY 36y Female  MRN#: [id number]     SUBJECTIVE  Patient is a 36y old Female who presents with a chief complaint of Mild DKA (05 Jan 2022 14:39)    Interval/Overnight Events:    Today is hospital day 14d, and this morning she is lying in bed without distress.   No acute overnight events.     OBJECTIVE  PAST MEDICAL & SURGICAL HISTORY  Neuropathy  right lower extremity, vaginal    Type 1 diabetes    Degenerative disc disease, thoracic    Urinary tract infection, recurrent    Gastroesophageal reflux disease, esophagitis presence not specified    Intractable headache, unspecified chronicity pattern, unspecified headache type    Major depressive disorder with single episode, in full remission  previously treated with zyprexa, celexa and lexapro    Tremor of right hand    Tachycardia    Spinal stenosis    No significant past surgical history      ALLERGIES:  amoxicillin (Unknown)  Ceclor (Rash)  ciprofloxacin (Unknown)  clindamycin (Unknown)  flu vaccines (Other)  gabapentin (Unknown)  Influenza Virus Vaccine, H5N1, Inactivated (Unknown)  penicillins (Unknown)    MEDICATIONS:  STANDING MEDICATIONS  amitriptyline Oral Tab/Cap - Peds 75 milliGRAM(s) Oral at bedtime  ammonium lactate 12% Lotion 1 Application(s) Topical every 12 hours  aspirin  chewable 81 milliGRAM(s) Oral daily  chlorhexidine 4% Liquid 1 Application(s) Topical daily  enoxaparin Injectable 40 milliGRAM(s) SubCutaneous daily  escitalopram 10 milliGRAM(s) Oral daily  famotidine    Tablet 40 milliGRAM(s) Oral two times a day  insulin glargine Injectable (LANTUS) 40 Unit(s) SubCutaneous every morning  insulin glargine Injectable (LANTUS) 12 Unit(s) SubCutaneous at bedtime  insulin lispro (ADMELOG) corrective regimen sliding scale   SubCutaneous three times a day before meals  insulin lispro Injectable (ADMELOG) 13 Unit(s) SubCutaneous three times a day before meals  metoprolol tartrate 25 milliGRAM(s) Oral two times a day  polyethylene glycol 3350 17 Gram(s) Oral daily  senna 2 Tablet(s) Oral at bedtime  sodium chloride 0.9%. 1000 milliLiter(s) IV Continuous <Continuous>    PRN MEDICATIONS  acetaminophen     Tablet .. 650 milliGRAM(s) Oral every 6 hours PRN  aluminum hydroxide/magnesium hydroxide/simethicone Suspension 30 milliLiter(s) Oral every 4 hours PRN  benzonatate 100 milliGRAM(s) Oral three times a day PRN  bisacodyl Suppository 10 milliGRAM(s) Rectal daily PRN  cyclobenzaprine 10 milliGRAM(s) Oral three times a day PRN  guaifenesin/dextromethorphan Oral Liquid 10 milliLiter(s) Oral every 4 hours PRN    HOME MEDICATIONS  Home Medications:  amitriptyline 75 mg oral tablet: 1 tab(s) orally once a day (at bedtime) (04 Nov 2021 15:06)  ammonium lactate 12% topical cream: Apply topically to affected area 2 times a day (23 Dec 2021 13:53)  aspirin 81 mg oral tablet, chewable: 1 tab(s) orally once a day (28 Dec 2021 09:29)  cyclobenzaprine 10 mg oral tablet: 1 tab(s) orally once a day (at bedtime) (28 Dec 2021 08:33)  metoprolol tartrate 25 mg oral tablet: 1 tab(s) orally 2 times a day (04 Nov 2021 15:06)  multivitamin: 1 tab(s) orally once a day (04 Nov 2021 15:06)      LABS:                        10.7   3.49  )-----------( 403      ( 05 Jan 2022 04:30 )             34.1     01-05    138  |  103  |  5<L>  ----------------------------<  138<H>  4.3   |  20  |  0.5<L>    Ca    8.8      05 Jan 2022 04:30  Mg     2.0     01-05    TPro  6.9  /  Alb  3.9  /  TBili  0.3  /  DBili  x   /  AST  59<H>  /  ALT  192<H>  /  AlkPhos  125<H>  01-05    LIVER FUNCTIONS - ( 05 Jan 2022 04:30 )  Alb: 3.9 g/dL / Pro: 6.9 g/dL / ALK PHOS: 125 U/L / ALT: 192 U/L / AST: 59 U/L / GGT: x                         CAPILLARY BLOOD GLUCOSE      POCT Blood Glucose.: 107 mg/dL (06 Jan 2022 07:19)      PHYSICAL EXAM:  T(C): 36.6 (01-06-22 @ 08:00), Max: 37.6 (01-05-22 @ 15:29)  HR: 102 (01-06-22 @ 08:00) (102 - 114)  BP: 147/89 (01-06-22 @ 08:00) (141/78 - 147/89)  RR: 19 (01-06-22 @ 08:00) (18 - 19)  SpO2: 99% (01-06-22 @ 08:00) (98% - 100%)    GENERAL: NAD, well-developed, 36y  EENT: EOMI, conjunctiva and sclera clear, No nasal obstruction or discharge  RESPIRATORY: Clear to auscultation bilaterally; No wheeze or crackles  CARDIOVASCULAR: Regular rate and rhythm; No murmurs, rubs, or gallops, no pitting edema  GASTROINTESTINAL: Abdomen Soft, Nontender, Nondistended, +BS  MUSCULOSKELETAL:  No cyanosis, extremities grossly symmetrical  PSYCH: AAOx3, affect appropriate  NEUROLOGY: non-focal, cognition grossly intact, MAEE    ADMISSION SUMMARY  Patient is a 36y old Female who presents with a chief complaint of Mild DKA (05 Jan 2022 14:39)

## 2022-01-06 NOTE — PROGRESS NOTE ADULT - ASSESSMENT
37 y/o woman with PMH of Type I DM on insulin pump, complicated by Diabetic neuropathy and vulvodynia, HTN, chronic back pain and migraine headaches presents with lower quadrant abdominal pain, nausea and generalized weakness for 3 days in the setting of elevated sugars, elevated beta-hydroxybuturate, mild HAGMA, all concerning for DKA which is now resolved. She was waiting for new insulin pump and then became COVID + during this time.     1. COVID 19  - PCR positive 12/31 and 1/4  - 1 episode of high grade fever  - was started on dexamethasone but ID said not necessary  - s/p remdesivir 200mg x1 - now stopped  - on RA and pulse ox is acceptable  - ? worsening abdominal pain and diarrhea due to COVID or GI pathology  - continue isolation per protocol    2. Intermittent episodes of abdominal Pain, diarrhea (sometimes with some blood) and constipation  - CT Abdomen and Pelvis w/ IV Cont 12/2/21:  No acute intra-abdominal or pelvic pathology is identified  - seen by GI earlier this admission  - on miralax and senna  - follows with Dr. Mejia - recall GI for f/u due to persistent symptoms  - tolerating current diet per staff  - check GI PCR, CRP and fecal calprotectin  - ? repeat CT scan of abdomen and pelvis  - outpt f/u for EGD and colonoscopy per GI - need to rule out IBD    3. Transaminitis  - now resolving  - remdesivir stopped  - s/p CLD Work up   - RUQ US normal  - monitor LFTs  - s/p recent MRI of abdomen 12/16     4. DKA now resolved  - on lantus 40 units AM, 12 units bedtime, lispro 13 units before meals  - Endocrine f/u appreciated  - awaiting insulin pump    5. Anxiety Disorder   - started on lexapro 5 mg per psychiatry and increased to 10mg daily    6. Sinus Tachycardia  - improved  - c/w metoprolol     7. DVT ppx: lovenox 40mg subQ daily      PROGRESS NOTE HANDOFF    Pending: insulin pump, GI f/u    pt aware of the plan of care  pt's mother updated today on the plan of care    Disposition: group home

## 2022-01-06 NOTE — PROGRESS NOTE ADULT - ASSESSMENT
35 y/o F with PMHx Type I DM on an insulin pump, complicated by Diabetic neuropathy and vulvodynia, HTN, chronic back pain, migraine headaches,  presented with  DKA, now resolved, was waiting for d/c upon arrival of new insulin pump but became COVID + required steroids , now off .   patient well known to me from outpatient setting , DKA likely because of pump malfunctioning , patient unable to take insulin by pen because of policy where she lives ( can not take injection and there is not nurse on site 24 hours ).  - prescription for new pump sent to [facility] and will be delivered to the hospital ,  will arrange her a virtual training to be trained on new pump . will keep same settings as her old pump (all in allscripts )   - FS reviewed   - continue Lantus 40 units in am agree with stopping   Lantus to 12 units at bedtime    - Continue lispro 13 units TIDAC   - continue 1:50 >150 TIDAC   - patient has to call [employer] (number provided to her ) to confirm she is agreeable that pump is delivered to the hospital and to order also a quick serter , i confirmed she has all other supplies needed to insert pump , will do virtual visit with [employer] from [employer] as soon as she get pump delivered

## 2022-01-06 NOTE — PROGRESS NOTE ADULT - SUBJECTIVE AND OBJECTIVE BOX
S: continue to have loose stools but improved from yesterday on IV fluids   O: Vital Signs Last 24 Hrs  T(C): 36.8 (06 Jan 2022 17:00), Max: 37.6 (05 Jan 2022 16:50)  T(F): 98.2 (06 Jan 2022 17:00), Max: 99.7 (05 Jan 2022 16:50)  HR: 106 (06 Jan 2022 17:00) (102 - 114)  BP: 127/74 (06 Jan 2022 17:00) (127/74 - 147/89)  BP(mean): 18 (06 Jan 2022 17:00) (18 - 18)  ABP: --  ABP(mean): --  RR: 100 (06 Jan 2022 17:00) (18 - 100)  SpO2: 99% (06 Jan 2022 08:00) (98% - 100%)      MEDICATIONS  (STANDING):  amitriptyline Oral Tab/Cap - Peds 75 milliGRAM(s) Oral at bedtime  ammonium lactate 12% Lotion 1 Application(s) Topical every 12 hours  aspirin  chewable 81 milliGRAM(s) Oral daily  chlorhexidine 4% Liquid 1 Application(s) Topical daily  enoxaparin Injectable 40 milliGRAM(s) SubCutaneous daily  escitalopram 10 milliGRAM(s) Oral daily  famotidine    Tablet 40 milliGRAM(s) Oral two times a day  insulin glargine Injectable (LANTUS) 40 Unit(s) SubCutaneous every morning  insulin glargine Injectable (LANTUS) 12 Unit(s) SubCutaneous at bedtime  insulin lispro (ADMELOG) corrective regimen sliding scale   SubCutaneous three times a day before meals  insulin lispro Injectable (ADMELOG) 13 Unit(s) SubCutaneous three times a day before meals  metoprolol tartrate 25 milliGRAM(s) Oral two times a day  sodium chloride 0.9%. 1000 milliLiter(s) (100 mL/Hr) IV Continuous <Continuous>    MEDICATIONS  (PRN):  acetaminophen     Tablet .. 650 milliGRAM(s) Oral every 6 hours PRN Moderate Pain (4 - 6)  aluminum hydroxide/magnesium hydroxide/simethicone Suspension 30 milliLiter(s) Oral every 4 hours PRN Dyspepsia  benzonatate 100 milliGRAM(s) Oral three times a day PRN Cough  bisacodyl Suppository 10 milliGRAM(s) Rectal daily PRN Constipation  cyclobenzaprine 10 milliGRAM(s) Oral three times a day PRN Muscle Spasm  guaifenesin/dextromethorphan Oral Liquid 10 milliLiter(s) Oral every 4 hours PRN Cough    01-06    140  |  103  |  3<L>  ----------------------------<  132<H>  4.7   |  19  |  0.5<L>    Ca    9.5      06 Jan 2022 04:30  Mg     1.9     01-06    TPro  7.7  /  Alb  4.3  /  TBili  0.3  /  DBili  x   /  AST  61<H>  /  ALT  177<H>  /  AlkPhos  135<H>  01-06

## 2022-01-06 NOTE — PROGRESS NOTE ADULT - SUBJECTIVE AND OBJECTIVE BOX
CRISTI MONTGOMERY  36y Female    INTERVAL HPI/OVERNIGHT EVENTS:    pt still c/o abdominal cramps, diarrhea - no bleeding  ate breakfast per staff  no fever  no SOB or cough    T(F): 97.8 (01-06-22 @ 08:00), Max: 99.7 (01-05-22 @ 15:29)  HR: 102 (01-06-22 @ 08:00) (102 - 114)  BP: 147/89 (01-06-22 @ 08:00) (141/78 - 147/89)  RR: 19 (01-06-22 @ 08:00) (18 - 19)  SpO2: 99% (01-06-22 @ 08:00) (98% - 100%) on RA      I&O's Summary    05 Jan 2022 07:01  -  06 Jan 2022 07:00  --------------------------------------------------------  IN: 3200 mL / OUT: 0 mL / NET: 3200 mL      CAPILLARY BLOOD GLUCOSE      POCT Blood Glucose.: 216 mg/dL (06 Jan 2022 11:09)  POCT Blood Glucose.: 107 mg/dL (06 Jan 2022 07:19)  POCT Blood Glucose.: 104 mg/dL (05 Jan 2022 22:02)  POCT Blood Glucose.: 55 mg/dL (05 Jan 2022 21:10)  POCT Blood Glucose.: 98 mg/dL (05 Jan 2022 17:02)        PHYSICAL EXAM:  GENERAL: NAD  HEAD:  Normocephalic  EYES:  conjunctiva and sclera clear  ENMT: Moist mucous membranes  NERVOUS SYSTEM:  Alert, awake, Good concentration  CHEST/LUNG: CTA b/l  HEART: Regular rate and rhythm; No murmurs  ABDOMEN: Soft, tender, distended; Bowel sounds present  EXTREMITIES:   No edema    Consultant(s) Notes Reviewed:  [x ] YES  [ ] NO  Care Discussed with Consultants/Other Providers [ x] YES  [ ] NO    MEDICATIONS  (STANDING):  amitriptyline Oral Tab/Cap - Peds 75 milliGRAM(s) Oral at bedtime  ammonium lactate 12% Lotion 1 Application(s) Topical every 12 hours  aspirin  chewable 81 milliGRAM(s) Oral daily  chlorhexidine 4% Liquid 1 Application(s) Topical daily  enoxaparin Injectable 40 milliGRAM(s) SubCutaneous daily  escitalopram 10 milliGRAM(s) Oral daily  famotidine    Tablet 40 milliGRAM(s) Oral two times a day  insulin glargine Injectable (LANTUS) 40 Unit(s) SubCutaneous every morning  insulin glargine Injectable (LANTUS) 12 Unit(s) SubCutaneous at bedtime  insulin lispro (ADMELOG) corrective regimen sliding scale   SubCutaneous three times a day before meals  insulin lispro Injectable (ADMELOG) 13 Unit(s) SubCutaneous three times a day before meals  metoprolol tartrate 25 milliGRAM(s) Oral two times a day  polyethylene glycol 3350 17 Gram(s) Oral daily  senna 2 Tablet(s) Oral at bedtime  sodium chloride 0.9%. 1000 milliLiter(s) (100 mL/Hr) IV Continuous <Continuous>    MEDICATIONS  (PRN):  acetaminophen     Tablet .. 650 milliGRAM(s) Oral every 6 hours PRN Moderate Pain (4 - 6)  aluminum hydroxide/magnesium hydroxide/simethicone Suspension 30 milliLiter(s) Oral every 4 hours PRN Dyspepsia  benzonatate 100 milliGRAM(s) Oral three times a day PRN Cough  bisacodyl Suppository 10 milliGRAM(s) Rectal daily PRN Constipation  cyclobenzaprine 10 milliGRAM(s) Oral three times a day PRN Muscle Spasm  guaifenesin/dextromethorphan Oral Liquid 10 milliLiter(s) Oral every 4 hours PRN Cough      LABS:                        11.6   3.66  )-----------( 438      ( 06 Jan 2022 04:30 )             36.8     01-06    140  |  103  |  3<L>  ----------------------------<  132<H>  4.7   |  19  |  0.5<L>    Ca    9.5      06 Jan 2022 04:30  Mg     1.9     01-06    TPro  7.7  /  Alb  4.3  /  TBili  0.3  /  DBili  x   /  AST  61<H>  /  ALT  177<H>  /  AlkPhos  135<H>  01-06          COVID-19 PCR: Detected (04 Jan 2022 15:21)  COVID-19 PCR: Detected (31 Dec 2021 21:46)  COVID-19 PCR: NotDetec (28 Dec 2021 09:46)  COVID-19 PCR: NotDetec (23 Dec 2021 09:56)  COVID-19 PCR: NotDetec (29 Nov 2021 10:00)  COVID-19 PCR: NotDetec (19 Nov 2021 10:00)  SARS-CoV-2: NotDetec (13 Nov 2021 13:25)  COVID-19 PCR: NotDetec (03 Oct 2021 14:45)    Ferritin, Serum: 9 ng/mL (12-23-21 @ 16:52)      D-Dimer Assay, Quantitative: 236 ng/mL DDU (01-02-22 @ 13:20)    Procalcitonin, Serum: 0.47 ng/mL (12-31-21 @ 22:44)              Case discussed with residents and RN on rounds today    Care discussed with pt and family

## 2022-01-06 NOTE — PROGRESS NOTE ADULT - ASSESSMENT
37 y/o F with PMHx Type I DM on an insulin pump, complicated by Diabetic neuropathy and vulvodynia, HTN, chronic back pain, migraine headaches,  presents with lower quadrant abdominal pain, nausea and generalized weakness for 3 days in the setting of elevated sugars, elevated beta-hydroxybuturate, mild GAP, all concerning for DKA, now resolved, was waiting for d/c upon arrival of new insulin pump but became COVID + while waiting. Pt initially required oxygen support w/ NC, but currently on room air, asymptomatic. Pt is awaiting delivery of insulin pump.    # COVID-19 PNA  - PCR positive  - 1 episode of high grade fever  - on dexamethasone 6mg q24h (day 2 of 10)  - s/p remdesivir 200mg x1  - ID consulted  - course complicated by rising LFTs, AST/ALT: 310/436  - stopped RDV, steroids as per ID. LFTs trending down  - c/w Robitussin 10mL Q4 PRN Cough  - c/w Benzonatate 100mg Oral three times a day PRN Cough  - wean 02 as titrated    # Abdominal Pain, resolved  # Uptrending LFTs, resolved  -CT Abdomen and Pelvis w/ IV Cont 12/2/21:  No acute intra-abdominal or pelvic pathology is identified  - CLD Work up: completed, all labs collected   - LFTs uptrending, stopped RDV, steroids as per ID  - blood cultures 12/31 NGTD  - RUQ US no significant findings  - monitor LFTs  - follow up outpatient with GI For MRI of liver lesions and colonoscopy to r/o ibd     # Diabetic Ketoacidosis, resolved  - patient treated in the ICU for DKA, now resolved  - episode of hypoglycemia, tx w/ dextrose, lispro decreased  - insulin lantus 40 units AM, 12 units bedtime, lispro 13 units before meals, SS  - followed by Endocrinology, previously on insulin pump  - Insulin pump prescription sent to [facility] as per endocrine     # Anxiety Disorder   - patient with hx of anxiety- started on lexapro 5 mg per psychiatry   - requests xanax occasionally    # Sinus Tachycardia, resolved  - c/w metoprolol     # Abdominal Pain   # Uptrending LFTs  -CT Abdomen and Pelvis w/ IV Cont 12/2/21:  No acute intra-abdominal or pelvic pathology is identified  - CLD Work up: completed, all labs collected   - RUQ US: no significant findings  - follow up outpatient with GI For MRI of liver lesions and colonoscopy to r/o ibd     Misc:   # DVT ppx: lovenox 40mg subQ daily  # GI ppx: N/A  # Diet: DASH/TLC & CC  # Activity: AAT  # CODE: Full Code  # Dispo: discharge planning

## 2022-01-07 ENCOUNTER — APPOINTMENT (OUTPATIENT)
Dept: GASTROENTEROLOGY | Facility: CLINIC | Age: 37
End: 2022-01-07

## 2022-01-07 LAB
ALBUMIN SERPL ELPH-MCNC: 3.9 G/DL — SIGNIFICANT CHANGE UP (ref 3.5–5.2)
ALP SERPL-CCNC: 119 U/L — HIGH (ref 30–115)
ALT FLD-CCNC: 135 U/L — HIGH (ref 0–41)
ANION GAP SERPL CALC-SCNC: 12 MMOL/L — SIGNIFICANT CHANGE UP (ref 7–14)
AST SERPL-CCNC: 47 U/L — HIGH (ref 0–41)
BASOPHILS # BLD AUTO: 0.02 K/UL — SIGNIFICANT CHANGE UP (ref 0–0.2)
BASOPHILS NFR BLD AUTO: 0.5 % — SIGNIFICANT CHANGE UP (ref 0–1)
BILIRUB SERPL-MCNC: 0.3 MG/DL — SIGNIFICANT CHANGE UP (ref 0.2–1.2)
BUN SERPL-MCNC: 6 MG/DL — LOW (ref 10–20)
C DIFF BY PCR RESULT: NEGATIVE — SIGNIFICANT CHANGE UP
C DIFF TOX GENS STL QL NAA+PROBE: SIGNIFICANT CHANGE UP
CALCIUM SERPL-MCNC: 8.4 MG/DL — LOW (ref 8.5–10.1)
CHLORIDE SERPL-SCNC: 100 MMOL/L — SIGNIFICANT CHANGE UP (ref 98–110)
CO2 SERPL-SCNC: 20 MMOL/L — SIGNIFICANT CHANGE UP (ref 17–32)
CREAT SERPL-MCNC: 0.5 MG/DL — LOW (ref 0.7–1.5)
EOSINOPHIL # BLD AUTO: 0.03 K/UL — SIGNIFICANT CHANGE UP (ref 0–0.7)
EOSINOPHIL NFR BLD AUTO: 0.7 % — SIGNIFICANT CHANGE UP (ref 0–8)
GLUCOSE BLDC GLUCOMTR-MCNC: 182 MG/DL — HIGH (ref 70–99)
GLUCOSE BLDC GLUCOMTR-MCNC: 185 MG/DL — HIGH (ref 70–99)
GLUCOSE BLDC GLUCOMTR-MCNC: 201 MG/DL — HIGH (ref 70–99)
GLUCOSE BLDC GLUCOMTR-MCNC: 87 MG/DL — SIGNIFICANT CHANGE UP (ref 70–99)
GLUCOSE SERPL-MCNC: 218 MG/DL — HIGH (ref 70–99)
HCT VFR BLD CALC: 33.8 % — LOW (ref 37–47)
HGB BLD-MCNC: 10.5 G/DL — LOW (ref 12–16)
IMM GRANULOCYTES NFR BLD AUTO: 0.2 % — SIGNIFICANT CHANGE UP (ref 0.1–0.3)
LYMPHOCYTES # BLD AUTO: 1.87 K/UL — SIGNIFICANT CHANGE UP (ref 1.2–3.4)
LYMPHOCYTES # BLD AUTO: 45 % — SIGNIFICANT CHANGE UP (ref 20.5–51.1)
MAGNESIUM SERPL-MCNC: 1.8 MG/DL — SIGNIFICANT CHANGE UP (ref 1.8–2.4)
MCHC RBC-ENTMCNC: 26.1 PG — LOW (ref 27–31)
MCHC RBC-ENTMCNC: 31.1 G/DL — LOW (ref 32–37)
MCV RBC AUTO: 84.1 FL — SIGNIFICANT CHANGE UP (ref 81–99)
MONOCYTES # BLD AUTO: 0.46 K/UL — SIGNIFICANT CHANGE UP (ref 0.1–0.6)
MONOCYTES NFR BLD AUTO: 11.1 % — HIGH (ref 1.7–9.3)
NEUTROPHILS # BLD AUTO: 1.77 K/UL — SIGNIFICANT CHANGE UP (ref 1.4–6.5)
NEUTROPHILS NFR BLD AUTO: 42.5 % — SIGNIFICANT CHANGE UP (ref 42.2–75.2)
NRBC # BLD: 0 /100 WBCS — SIGNIFICANT CHANGE UP (ref 0–0)
PLATELET # BLD AUTO: 377 K/UL — SIGNIFICANT CHANGE UP (ref 130–400)
POTASSIUM SERPL-MCNC: 4.1 MMOL/L — SIGNIFICANT CHANGE UP (ref 3.5–5)
POTASSIUM SERPL-SCNC: 4.1 MMOL/L — SIGNIFICANT CHANGE UP (ref 3.5–5)
PROT SERPL-MCNC: 6.6 G/DL — SIGNIFICANT CHANGE UP (ref 6–8)
RBC # BLD: 4.02 M/UL — LOW (ref 4.2–5.4)
RBC # FLD: 14.3 % — SIGNIFICANT CHANGE UP (ref 11.5–14.5)
SODIUM SERPL-SCNC: 132 MMOL/L — LOW (ref 135–146)
WBC # BLD: 4.16 K/UL — LOW (ref 4.8–10.8)
WBC # FLD AUTO: 4.16 K/UL — LOW (ref 4.8–10.8)

## 2022-01-07 PROCEDURE — 99232 SBSQ HOSP IP/OBS MODERATE 35: CPT

## 2022-01-07 RX ADMIN — Medication 650 MILLIGRAM(S): at 21:46

## 2022-01-07 RX ADMIN — INSULIN GLARGINE 40 UNIT(S): 100 INJECTION, SOLUTION SUBCUTANEOUS at 08:00

## 2022-01-07 RX ADMIN — ESCITALOPRAM OXALATE 10 MILLIGRAM(S): 10 TABLET, FILM COATED ORAL at 11:41

## 2022-01-07 RX ADMIN — Medication 2: at 08:01

## 2022-01-07 RX ADMIN — INSULIN GLARGINE 12 UNIT(S): 100 INJECTION, SOLUTION SUBCUTANEOUS at 21:40

## 2022-01-07 RX ADMIN — Medication 13 UNIT(S): at 11:42

## 2022-01-07 RX ADMIN — FAMOTIDINE 40 MILLIGRAM(S): 10 INJECTION INTRAVENOUS at 05:57

## 2022-01-07 RX ADMIN — Medication 13 UNIT(S): at 17:22

## 2022-01-07 RX ADMIN — SODIUM CHLORIDE 100 MILLILITER(S): 9 INJECTION INTRAMUSCULAR; INTRAVENOUS; SUBCUTANEOUS at 02:30

## 2022-01-07 RX ADMIN — Medication 1: at 17:22

## 2022-01-07 RX ADMIN — Medication 13 UNIT(S): at 08:01

## 2022-01-07 RX ADMIN — ENOXAPARIN SODIUM 40 MILLIGRAM(S): 100 INJECTION SUBCUTANEOUS at 11:41

## 2022-01-07 RX ADMIN — Medication 1 APPLICATION(S): at 17:21

## 2022-01-07 RX ADMIN — Medication 81 MILLIGRAM(S): at 11:41

## 2022-01-07 RX ADMIN — Medication 25 MILLIGRAM(S): at 05:57

## 2022-01-07 RX ADMIN — Medication 30 MILLILITER(S): at 06:06

## 2022-01-07 RX ADMIN — Medication 75 MILLIGRAM(S): at 21:40

## 2022-01-07 RX ADMIN — Medication 1: at 11:42

## 2022-01-07 RX ADMIN — Medication 1 APPLICATION(S): at 06:09

## 2022-01-07 RX ADMIN — FAMOTIDINE 40 MILLIGRAM(S): 10 INJECTION INTRAVENOUS at 17:37

## 2022-01-07 RX ADMIN — Medication 25 MILLIGRAM(S): at 17:22

## 2022-01-07 NOTE — PROGRESS NOTE ADULT - ASSESSMENT
37 y/o F with PMHx Type I DM on an insulin pump, complicated by Diabetic neuropathy and vulvodynia, HTN, chronic back pain, migraine headaches,  presents with lower quadrant abdominal pain, nausea and generalized weakness for 3 days in the setting of elevated sugars, elevated beta-hydroxybuturate, mild GAP, all concerning for DKA, now resolved, was waiting for d/c upon arrival of new insulin pump but became COVID + while waiting. Pt initially required oxygen support w/ NC, but currently on room air, asymptomatic. Pt is awaiting delivery of insulin pump.    # COVID-19 PNA  - PCR positive  - 1 episode of high grade fever  - on dexamethasone 6mg q24h (day 2 of 10)  - s/p remdesivir 200mg x1  - ID consulted  - course complicated by rising LFTs, AST/ALT: 310/436  - stopped RDV, steroids as per ID. LFTs trending down  - c/w Robitussin 10mL Q4 PRN Cough  - c/w Benzonatate 100mg Oral three times a day PRN Cough  - wean 02 as titrated    # Abdominal Pain, resolved  # Uptrending LFTs, resolved  -CT Abdomen and Pelvis w/ IV Cont 12/2/21:  No acute intra-abdominal or pelvic pathology is identified  - CLD Work up: completed, all labs collected   - LFTs uptrending, stopped RDV, steroids as per ID  - blood cultures 12/31 NGTD  - RUQ US no significant findings  - monitor LFTs  - follow up outpatient with GI For MRI of liver lesions and colonoscopy to r/o IBD    # Diabetic Ketoacidosis, resolved  - patient treated in the ICU for DKA, now resolved  - episode of hypoglycemia, tx w/ dextrose, lispro decreased  - insulin lantus 40 units AM, 12 units bedtime, lispro 13 units before meals, SS  - followed by Endocrinology, previously on insulin pump  - Insulin pump prescription sent to [facility] as per endocrine     # Anxiety Disorder   - patient with hx of anxiety- started on lexapro 5 mg per psychiatry   - requests xanax occasionally    # Sinus Tachycardia, resolved  - c/w metoprolol     # Abdominal Pain   # Uptrending LFTs  -CT Abdomen and Pelvis w/ IV Cont 12/2/21:  No acute intra-abdominal or pelvic pathology is identified  - CLD Work up: completed, all labs collected   - RUQ US: no significant findings  - follow up outpatient with GI For MRI of liver lesions and colonoscopy to r/o ibd     Misc:   # DVT ppx: lovenox 40mg subQ daily  # GI ppx: N/A  # Diet: DASH/TLC & CC  # Activity: AAT  # CODE: Full Code  # Dispo: discharge planning   35 y/o F with PMHx Type I DM on an insulin pump, complicated by Diabetic neuropathy and vulvodynia, HTN, chronic back pain, migraine headaches,  presents with lower quadrant abdominal pain, nausea and generalized weakness for 3 days in the setting of elevated sugars, elevated beta-hydroxybuturate, mild GAP, all concerning for DKA, now resolved, was waiting for d/c upon arrival of new insulin pump but became COVID + while waiting. Pt initially required oxygen support w/ NC, but currently on room air, asymptomatic. Pt is awaiting delivery of insulin pump.    # COVID-19 PNA  - PCR positive  - 1 episode of high grade fever  - on dexamethasone 6mg q24h (day 2 of 10)  - s/p remdesivir 200mg x1  - ID consulted  - course complicated by rising LFTs, AST/ALT: 310/436  - stopped RDV, steroids as per ID. LFTs trending down  - c/w Robitussin 10mL Q4 PRN Cough  - c/w Benzonatate 100mg Oral three times a day PRN Cough  - wean 02 as titrated    # Abdominal Pain, resolved  # Uptrending LFTs, resolved  -CT Abdomen and Pelvis w/ IV Cont 12/2/21:  No acute intra-abdominal or pelvic pathology is identified  - CLD Work up: completed, all labs collected   - LFTs uptrending, stopped RDV, steroids as per ID  - blood cultures 12/31 NGTD  - RUQ US no significant findings  - stopped miralax ,senna due to complaints of multiple episodes of diarrhea  - pepto-bismol for cramping, diarrhea  - monitor LFTs  - follow up outpatient with GI For MRI of liver lesions and colonoscopy to r/o IBD    # Diabetic Ketoacidosis, resolved  - patient treated in the ICU for DKA, now resolved  - episode of hypoglycemia, tx w/ dextrose, lispro decreased  - insulin lantus 40 units AM, 12 units bedtime, lispro 13 units before meals, SS  - followed by Endocrinology, previously on insulin pump  - Insulin pump prescription sent to [facility] as per endocrine     # Anxiety Disorder   - patient with hx of anxiety- started on lexapro 5 mg per psychiatry   - requests xanax occasionally    # Sinus Tachycardia, resolved  - c/w metoprolol     # Abdominal Pain   # Uptrending LFTs  -CT Abdomen and Pelvis w/ IV Cont 12/2/21:  No acute intra-abdominal or pelvic pathology is identified  - CLD Work up: completed, all labs collected   - RUQ US: no significant findings  - follow up outpatient with GI For MRI of liver lesions and colonoscopy to r/o ibd     Misc:   # DVT ppx: lovenox 40mg subQ daily  # GI ppx: N/A  # Diet: DASH/TLC & CC  # Activity: AAT  # CODE: Full Code  # Dispo: discharge planning, awaiting insulin pump delivery

## 2022-01-07 NOTE — PROGRESS NOTE ADULT - SUBJECTIVE AND OBJECTIVE BOX
CRISTI MONTGOMERY 36y Female  MRN#: [id number]     SUBJECTIVE  Patient is a 36y old Female who presents with a chief complaint of Mild DKA (06 Jan 2022 16:23)    Interval/Overnight Events:    Today is hospital day 15d, and this morning she is lying in bed without distress.   No acute overnight events.     OBJECTIVE  PAST MEDICAL & SURGICAL HISTORY  Neuropathy  right lower extremity, vaginal    Type 1 diabetes    Degenerative disc disease, thoracic    Urinary tract infection, recurrent    Gastroesophageal reflux disease, esophagitis presence not specified    Intractable headache, unspecified chronicity pattern, unspecified headache type    Major depressive disorder with single episode, in full remission  previously treated with zyprexa, celexa and lexapro    Tremor of right hand    Tachycardia    Spinal stenosis    No significant past surgical history      ALLERGIES:  amoxicillin (Unknown)  Ceclor (Rash)  ciprofloxacin (Unknown)  clindamycin (Unknown)  flu vaccines (Other)  gabapentin (Unknown)  Influenza Virus Vaccine, H5N1, Inactivated (Unknown)  penicillins (Unknown)    MEDICATIONS:  STANDING MEDICATIONS  amitriptyline Oral Tab/Cap - Peds 75 milliGRAM(s) Oral at bedtime  ammonium lactate 12% Lotion 1 Application(s) Topical every 12 hours  aspirin  chewable 81 milliGRAM(s) Oral daily  chlorhexidine 4% Liquid 1 Application(s) Topical daily  enoxaparin Injectable 40 milliGRAM(s) SubCutaneous daily  escitalopram 10 milliGRAM(s) Oral daily  famotidine    Tablet 40 milliGRAM(s) Oral two times a day  insulin glargine Injectable (LANTUS) 40 Unit(s) SubCutaneous every morning  insulin glargine Injectable (LANTUS) 12 Unit(s) SubCutaneous at bedtime  insulin lispro (ADMELOG) corrective regimen sliding scale   SubCutaneous three times a day before meals  insulin lispro Injectable (ADMELOG) 13 Unit(s) SubCutaneous three times a day before meals  metoprolol tartrate 25 milliGRAM(s) Oral two times a day  sodium chloride 0.9%. 1000 milliLiter(s) IV Continuous <Continuous>    PRN MEDICATIONS  acetaminophen     Tablet .. 650 milliGRAM(s) Oral every 6 hours PRN  aluminum hydroxide/magnesium hydroxide/simethicone Suspension 30 milliLiter(s) Oral every 4 hours PRN  benzonatate 100 milliGRAM(s) Oral three times a day PRN  bisacodyl Suppository 10 milliGRAM(s) Rectal daily PRN  cyclobenzaprine 10 milliGRAM(s) Oral three times a day PRN  guaifenesin/dextromethorphan Oral Liquid 10 milliLiter(s) Oral every 4 hours PRN    HOME MEDICATIONS  Home Medications:  amitriptyline 75 mg oral tablet: 1 tab(s) orally once a day (at bedtime) (04 Nov 2021 15:06)  ammonium lactate 12% topical cream: Apply topically to affected area 2 times a day (23 Dec 2021 13:53)  aspirin 81 mg oral tablet, chewable: 1 tab(s) orally once a day (28 Dec 2021 09:29)  cyclobenzaprine 10 mg oral tablet: 1 tab(s) orally once a day (at bedtime) (28 Dec 2021 08:33)  metoprolol tartrate 25 mg oral tablet: 1 tab(s) orally 2 times a day (04 Nov 2021 15:06)  multivitamin: 1 tab(s) orally once a day (04 Nov 2021 15:06)      LABS:                        10.5   4.16  )-----------( 377      ( 07 Jan 2022 07:00 )             33.8     01-07    132<L>  |  100  |  6<L>  ----------------------------<  218<H>  4.1   |  20  |  0.5<L>    Ca    8.4<L>      07 Jan 2022 07:00  Mg     1.8     01-07    TPro  6.6  /  Alb  3.9  /  TBili  0.3  /  DBili  x   /  AST  47<H>  /  ALT  135<H>  /  AlkPhos  119<H>  01-07    LIVER FUNCTIONS - ( 07 Jan 2022 07:00 )  Alb: 3.9 g/dL / Pro: 6.6 g/dL / ALK PHOS: 119 U/L / ALT: 135 U/L / AST: 47 U/L / GGT: x                         CAPILLARY BLOOD GLUCOSE      POCT Blood Glucose.: 201 mg/dL (07 Jan 2022 07:48)      PHYSICAL EXAM:  T(C): 35.7 (01-07-22 @ 08:00), Max: 36.8 (01-06-22 @ 15:27)  HR: 95 (01-07-22 @ 08:00) (94 - 106)  BP: 145/90 (01-07-22 @ 08:00) (124/74 - 145/90)  RR: 18 (01-07-22 @ 08:00) (18 - 18)  SpO2: 100% (01-07-22 @ 08:00) (99% - 100%)    GENERAL: NAD, well-developed, 36y  EENT: EOMI, conjunctiva and sclera clear, No nasal obstruction or discharge  RESPIRATORY: Clear to auscultation bilaterally; No wheeze or crackles  CARDIOVASCULAR: Regular rate and rhythm; No murmurs, rubs, or gallops, no pitting edema  GASTROINTESTINAL: Abdomen Soft, Nontender, Nondistended, +BS  MUSCULOSKELETAL:  No cyanosis, extremities grossly symmetrical  PSYCH: AAOx3, affect appropriate  NEUROLOGY: non-focal, cognition grossly intact, MAEE    ADMISSION SUMMARY  Patient is a 36y old Female who presents with a chief complaint of Mild DKA (06 Jan 2022 16:23)

## 2022-01-07 NOTE — PROGRESS NOTE ADULT - ASSESSMENT
37 y/o woman with PMH of Type I DM on insulin pump, complicated by Diabetic neuropathy and vulvodynia, HTN, chronic back pain and migraine headaches presents with lower quadrant abdominal pain, nausea and generalized weakness for 3 days in the setting of elevated sugars, elevated beta-hydroxybuturate, mild HAGMA, all concerning for DKA which is now resolved. She was waiting for new insulin pump and then became COVID + during this time.     1. COVID 19  - PCR positive 12/31 and 1/4  - 1 episode of high grade fever  - was started on dexamethasone but ID said not necessary  - s/p remdesivir 200mg x1 - now stopped  - on RA and pulse ox is acceptable  -  possibly worsening abdominal pain and diarrhea due to COVID and laxatives  - continue isolation per protocol    2. Intermittent episodes of abdominal Pain, diarrhea (sometimes with some blood) and constipation  - CT Abdomen and Pelvis w/ IV Cont 12/2/21:  No acute intra-abdominal or pelvic pathology is identified  - seen by GI earlier this admission  - on miralax and senna - now stopped  - follows with Dr. Mejia - recalled GI on 1/6 who recommended to stop laxatives and for outpt f/u - pt with chronic symptoms  - tolerating current diet per staff  - check GI PCR, CRP and fecal calprotectin if persistent diarrhea off laxatives  - outpt f/u for EGD and colonoscopy per GI - need to rule out IBD  - PPI and maalox for heartburn - pt instructed on antireflux measures    3. Transaminitis  - now resolving  - remdesivir stopped  - s/p CLD Work up   - RUQ US normal  - monitor LFTs  - s/p recent MRI of abdomen 12/16     4. DKA now resolved  - on lantus 40 units AM, 12 units bedtime, lispro 13 units before meals  - Endocrine f/u appreciated  - awaiting insulin pump delivery - pt needs to use insulin pump at her residence    5. Anxiety Disorder   - started on lexapro 5 mg per psychiatry and increased to 10mg daily    6. Sinus Tachycardia  - improved  - c/w metoprolol     7. DVT ppx: lovenox 40mg subQ daily      PROGRESS NOTE HANDOFF    Pending: insulin pump delivery    pt aware of the plan of care  pt's mother updated on 1/6 on the plan of care    [facility]: [facility]

## 2022-01-07 NOTE — PROGRESS NOTE ADULT - SUBJECTIVE AND OBJECTIVE BOX
CRISTI MONTGOMERY  36y Female    INTERVAL HPI/OVERNIGHT EVENTS:    Diarrhea now resolved after laxatives stopped  had BM  still with abdominal cramping  + chronic heartburn  pt admits to not sitting upright after eating - usually lies down  instructed pt to sit up after meals  + cough  no fever    T(F): 96.2 (01-07-22 @ 08:00), Max: 98.2 (01-06-22 @ 15:27)  HR: 95 (01-07-22 @ 08:00) (94 - 106)  BP: 145/90 (01-07-22 @ 08:00) (124/74 - 145/90)  RR: 18 (01-07-22 @ 08:00) (18 - 18)  SpO2: 100% (01-07-22 @ 08:00) (99% - 100%) on RA  I&O's Summary    CAPILLARY BLOOD GLUCOSE      POCT Blood Glucose.: 185 mg/dL (07 Jan 2022 11:27)  POCT Blood Glucose.: 201 mg/dL (07 Jan 2022 07:48)  POCT Blood Glucose.: 117 mg/dL (06 Jan 2022 21:21)  POCT Blood Glucose.: 213 mg/dL (06 Jan 2022 17:00)        PHYSICAL EXAM:  GENERAL: NAD  HEAD:  Normocephalic  EYES:  conjunctiva and sclera clear  ENMT: Moist mucous membranes  NERVOUS SYSTEM:  Alert, awake, Good concentration  CHEST/LUNG: CTA b/l  HEART: Regular rate and rhythm  ABDOMEN: Soft, Nontender, distended; Bowel sounds present  EXTREMITIES:   No edema    Consultant(s) Notes Reviewed:  [x ] YES  [ ] NO  Care Discussed with Consultants/Other Providers [ x] YES  [ ] NO    MEDICATIONS  (STANDING):  amitriptyline Oral Tab/Cap - Peds 75 milliGRAM(s) Oral at bedtime  ammonium lactate 12% Lotion 1 Application(s) Topical every 12 hours  aspirin  chewable 81 milliGRAM(s) Oral daily  chlorhexidine 4% Liquid 1 Application(s) Topical daily  enoxaparin Injectable 40 milliGRAM(s) SubCutaneous daily  escitalopram 10 milliGRAM(s) Oral daily  famotidine    Tablet 40 milliGRAM(s) Oral two times a day  insulin glargine Injectable (LANTUS) 40 Unit(s) SubCutaneous every morning  insulin glargine Injectable (LANTUS) 12 Unit(s) SubCutaneous at bedtime  insulin lispro (ADMELOG) corrective regimen sliding scale   SubCutaneous three times a day before meals  insulin lispro Injectable (ADMELOG) 13 Unit(s) SubCutaneous three times a day before meals  metoprolol tartrate 25 milliGRAM(s) Oral two times a day    MEDICATIONS  (PRN):  acetaminophen     Tablet .. 650 milliGRAM(s) Oral every 6 hours PRN Moderate Pain (4 - 6)  aluminum hydroxide/magnesium hydroxide/simethicone Suspension 30 milliLiter(s) Oral every 4 hours PRN Dyspepsia  benzonatate 100 milliGRAM(s) Oral three times a day PRN Cough  bisacodyl Suppository 10 milliGRAM(s) Rectal daily PRN Constipation  bismuth subsalicylate Liquid 10 milliLiter(s) Oral every 8 hours PRN Diarrhea  cyclobenzaprine 10 milliGRAM(s) Oral three times a day PRN Muscle Spasm  guaifenesin/dextromethorphan Oral Liquid 10 milliLiter(s) Oral every 4 hours PRN Cough      LABS:                        10.5   4.16  )-----------( 377      ( 07 Jan 2022 07:00 )             33.8     01-07    132<L>  |  100  |  6<L>  ----------------------------<  218<H>  4.1   |  20  |  0.5<L>    Ca    8.4<L>      07 Jan 2022 07:00  Mg     1.8     01-07    TPro  6.6  /  Alb  3.9  /  TBili  0.3  /  DBili  x   /  AST  47<H>  /  ALT  135<H>  /  AlkPhos  119<H>  01-07          COVID-19 PCR: Detected (04 Jan 2022 15:21)  COVID-19 PCR: Detected (31 Dec 2021 21:46)  COVID-19 PCR: NotDetec (28 Dec 2021 09:46)  COVID-19 PCR: NotDetec (23 Dec 2021 09:56)  COVID-19 PCR: NotDetec (29 Nov 2021 10:00)  COVID-19 PCR: NotDetec (19 Nov 2021 10:00)  SARS-CoV-2: NotDetec (13 Nov 2021 13:25)  COVID-19 PCR: NotDetec (03 Oct 2021 14:45)    Ferritin, Serum: 9 ng/mL (12-23-21 @ 16:52)      D-Dimer Assay, Quantitative: 236 ng/mL DDU (01-02-22 @ 13:20)    Procalcitonin, Serum: 0.47 ng/mL (12-31-21 @ 22:44)      Case discussed with residents and RN on rounds today    Care discussed with pt

## 2022-01-08 LAB
ALBUMIN SERPL ELPH-MCNC: 4.3 G/DL — SIGNIFICANT CHANGE UP (ref 3.5–5.2)
ALP SERPL-CCNC: 122 U/L — HIGH (ref 30–115)
ALT FLD-CCNC: 132 U/L — HIGH (ref 0–41)
ANION GAP SERPL CALC-SCNC: 17 MMOL/L — HIGH (ref 7–14)
AST SERPL-CCNC: 49 U/L — HIGH (ref 0–41)
BASOPHILS # BLD AUTO: 0.03 K/UL — SIGNIFICANT CHANGE UP (ref 0–0.2)
BASOPHILS NFR BLD AUTO: 0.4 % — SIGNIFICANT CHANGE UP (ref 0–1)
BILIRUB SERPL-MCNC: 0.3 MG/DL — SIGNIFICANT CHANGE UP (ref 0.2–1.2)
BUN SERPL-MCNC: 9 MG/DL — LOW (ref 10–20)
CALCIUM SERPL-MCNC: 9.3 MG/DL — SIGNIFICANT CHANGE UP (ref 8.5–10.1)
CHLORIDE SERPL-SCNC: 99 MMOL/L — SIGNIFICANT CHANGE UP (ref 98–110)
CO2 SERPL-SCNC: 21 MMOL/L — SIGNIFICANT CHANGE UP (ref 17–32)
CREAT SERPL-MCNC: 0.6 MG/DL — LOW (ref 0.7–1.5)
EOSINOPHIL # BLD AUTO: 0.02 K/UL — SIGNIFICANT CHANGE UP (ref 0–0.7)
EOSINOPHIL NFR BLD AUTO: 0.3 % — SIGNIFICANT CHANGE UP (ref 0–8)
GLUCOSE BLDC GLUCOMTR-MCNC: 173 MG/DL — HIGH (ref 70–99)
GLUCOSE BLDC GLUCOMTR-MCNC: 220 MG/DL — HIGH (ref 70–99)
GLUCOSE BLDC GLUCOMTR-MCNC: 225 MG/DL — HIGH (ref 70–99)
GLUCOSE BLDC GLUCOMTR-MCNC: 70 MG/DL — SIGNIFICANT CHANGE UP (ref 70–99)
GLUCOSE SERPL-MCNC: 269 MG/DL — HIGH (ref 70–99)
HCT VFR BLD CALC: 36.4 % — LOW (ref 37–47)
HGB BLD-MCNC: 11.1 G/DL — LOW (ref 12–16)
IMM GRANULOCYTES NFR BLD AUTO: 0.6 % — HIGH (ref 0.1–0.3)
LYMPHOCYTES # BLD AUTO: 1.62 K/UL — SIGNIFICANT CHANGE UP (ref 1.2–3.4)
LYMPHOCYTES # BLD AUTO: 24.1 % — SIGNIFICANT CHANGE UP (ref 20.5–51.1)
MAGNESIUM SERPL-MCNC: 1.9 MG/DL — SIGNIFICANT CHANGE UP (ref 1.8–2.4)
MCHC RBC-ENTMCNC: 25.8 PG — LOW (ref 27–31)
MCHC RBC-ENTMCNC: 30.5 G/DL — LOW (ref 32–37)
MCV RBC AUTO: 84.7 FL — SIGNIFICANT CHANGE UP (ref 81–99)
MONOCYTES # BLD AUTO: 0.51 K/UL — SIGNIFICANT CHANGE UP (ref 0.1–0.6)
MONOCYTES NFR BLD AUTO: 7.6 % — SIGNIFICANT CHANGE UP (ref 1.7–9.3)
NEUTROPHILS # BLD AUTO: 4.51 K/UL — SIGNIFICANT CHANGE UP (ref 1.4–6.5)
NEUTROPHILS NFR BLD AUTO: 67 % — SIGNIFICANT CHANGE UP (ref 42.2–75.2)
NRBC # BLD: 0 /100 WBCS — SIGNIFICANT CHANGE UP (ref 0–0)
PLATELET # BLD AUTO: 418 K/UL — HIGH (ref 130–400)
POTASSIUM SERPL-MCNC: 5.1 MMOL/L — HIGH (ref 3.5–5)
POTASSIUM SERPL-SCNC: 5.1 MMOL/L — HIGH (ref 3.5–5)
PROT SERPL-MCNC: 7.3 G/DL — SIGNIFICANT CHANGE UP (ref 6–8)
RBC # BLD: 4.3 M/UL — SIGNIFICANT CHANGE UP (ref 4.2–5.4)
RBC # FLD: 14.3 % — SIGNIFICANT CHANGE UP (ref 11.5–14.5)
SODIUM SERPL-SCNC: 137 MMOL/L — SIGNIFICANT CHANGE UP (ref 135–146)
WBC # BLD: 6.73 K/UL — SIGNIFICANT CHANGE UP (ref 4.8–10.8)
WBC # FLD AUTO: 6.73 K/UL — SIGNIFICANT CHANGE UP (ref 4.8–10.8)

## 2022-01-08 PROCEDURE — 99232 SBSQ HOSP IP/OBS MODERATE 35: CPT

## 2022-01-08 RX ORDER — SODIUM ZIRCONIUM CYCLOSILICATE 10 G/10G
5 POWDER, FOR SUSPENSION ORAL ONCE
Refills: 0 | Status: COMPLETED | OUTPATIENT
Start: 2022-01-08 | End: 2022-01-08

## 2022-01-08 RX ORDER — OXYCODONE HYDROCHLORIDE 5 MG/1
5 TABLET ORAL ONCE
Refills: 0 | Status: DISCONTINUED | OUTPATIENT
Start: 2022-01-08 | End: 2022-01-08

## 2022-01-08 RX ADMIN — Medication 25 MILLIGRAM(S): at 05:59

## 2022-01-08 RX ADMIN — Medication 13 UNIT(S): at 11:36

## 2022-01-08 RX ADMIN — Medication 10 MILLILITER(S): at 05:58

## 2022-01-08 RX ADMIN — CYCLOBENZAPRINE HYDROCHLORIDE 10 MILLIGRAM(S): 10 TABLET, FILM COATED ORAL at 21:44

## 2022-01-08 RX ADMIN — Medication 1: at 11:36

## 2022-01-08 RX ADMIN — Medication 25 MILLIGRAM(S): at 17:06

## 2022-01-08 RX ADMIN — Medication 2: at 07:39

## 2022-01-08 RX ADMIN — CHLORHEXIDINE GLUCONATE 1 APPLICATION(S): 213 SOLUTION TOPICAL at 11:37

## 2022-01-08 RX ADMIN — OXYCODONE HYDROCHLORIDE 5 MILLIGRAM(S): 5 TABLET ORAL at 16:20

## 2022-01-08 RX ADMIN — SODIUM ZIRCONIUM CYCLOSILICATE 5 GRAM(S): 10 POWDER, FOR SUSPENSION ORAL at 12:00

## 2022-01-08 RX ADMIN — INSULIN GLARGINE 40 UNIT(S): 100 INJECTION, SOLUTION SUBCUTANEOUS at 07:40

## 2022-01-08 RX ADMIN — Medication 1 APPLICATION(S): at 16:49

## 2022-01-08 RX ADMIN — FAMOTIDINE 40 MILLIGRAM(S): 10 INJECTION INTRAVENOUS at 17:06

## 2022-01-08 RX ADMIN — INSULIN GLARGINE 12 UNIT(S): 100 INJECTION, SOLUTION SUBCUTANEOUS at 21:35

## 2022-01-08 RX ADMIN — Medication 1 APPLICATION(S): at 06:00

## 2022-01-08 RX ADMIN — ESCITALOPRAM OXALATE 10 MILLIGRAM(S): 10 TABLET, FILM COATED ORAL at 11:37

## 2022-01-08 RX ADMIN — ENOXAPARIN SODIUM 40 MILLIGRAM(S): 100 INJECTION SUBCUTANEOUS at 11:37

## 2022-01-08 RX ADMIN — Medication 13 UNIT(S): at 07:39

## 2022-01-08 RX ADMIN — Medication 75 MILLIGRAM(S): at 21:35

## 2022-01-08 RX ADMIN — FAMOTIDINE 40 MILLIGRAM(S): 10 INJECTION INTRAVENOUS at 05:59

## 2022-01-08 RX ADMIN — Medication 81 MILLIGRAM(S): at 11:37

## 2022-01-08 NOTE — PROVIDER CONTACT NOTE (OTHER) - BACKGROUND
Pt still complaining about abd pain and cramping. has not had pain meds ordered yet, requesting medication
Pt c/o of abd pain and diarrhea this morning. pt states abd pain and cramping is not new. pt requesting pain medication. tylenol ordered but refusing tylenol

## 2022-01-08 NOTE — PROGRESS NOTE ADULT - ASSESSMENT
37 y/o F with PMHx Type I DM on an insulin pump, complicated by Diabetic neuropathy and vulvodynia, HTN, chronic back pain, migraine headaches,  presents with lower quadrant abdominal pain, nausea and generalized weakness for 3 days in the setting of elevated sugars, elevated beta-hydroxybuturate, mild GAP, all concerning for DKA, now resolved, was waiting for d/c upon arrival of new insulin pump but became COVID + while waiting. Pt initially required oxygen support w/ NC, but currently on room air, asymptomatic. Pt is awaiting delivery of insulin pump.    # COVID-19 PNA  - PCR positive  - 1 episode of high grade fever  - on dexamethasone 6mg q24h (day 2 of 10)  - s/p remdesivir 200mg x1  - ID consulted  - course complicated by rising LFTs, AST/ALT: 310/436  - stopped RDV, steroids as per ID. LFTs trending down  - c/w Robitussin 10mL Q4 PRN Cough  - c/w Benzonatate 100mg Oral three times a day PRN Cough  - wean 02 as titrated    # Abdominal Pain, resolved  # Uptrending LFTs, resolved  -CT Abdomen and Pelvis w/ IV Cont 12/2/21:  No acute intra-abdominal or pelvic pathology is identified  - CLD Work up: completed, all labs collected   - LFTs uptrending, stopped RDV, steroids as per ID  - blood cultures 12/31 NGTD  - RUQ US no significant findings  - stopped miralax ,senna due to complaints of multiple episodes of diarrhea  - pepto-bismol for cramping, diarrhea  - monitor LFTs  - follow up outpatient with GI For MRI of liver lesions and colonoscopy to r/o IBD    # Diabetic Ketoacidosis, resolved  - patient treated in the ICU for DKA, now resolved  - episode of hypoglycemia, tx w/ dextrose, lispro decreased  - insulin lantus 40 units AM, 12 units bedtime, lispro 13 units before meals, SS  - followed by Endocrinology, previously on insulin pump  - Insulin pump prescription sent to [facility] as per endocrine     # Anxiety Disorder   - patient with hx of anxiety- started on lexapro 5 mg per psychiatry   - requests xanax occasionally    # Sinus Tachycardia, resolved  - c/w metoprolol     # Abdominal Pain   # Uptrending LFTs  -CT Abdomen and Pelvis w/ IV Cont 12/2/21:  No acute intra-abdominal or pelvic pathology is identified  - CLD Work up: completed, all labs collected   - RUQ US: no significant findings  - follow up outpatient with GI For MRI of liver lesions and colonoscopy to r/o ibd     Misc:   # DVT ppx: lovenox 40mg subQ daily  # GI ppx: N/A  # Diet: DASH/TLC & CC  # Activity: AAT  # CODE: Full Code  # Dispo: discharge planning, awaiting insulin pump delivery

## 2022-01-08 NOTE — PROGRESS NOTE ADULT - ATTENDING COMMENTS
seen this morning    as above    d/w resident    hopeful d/c to group home 12/28
37 y/o F with PMHx Type I DM on an insulin pump, complicated by Diabetic neuropathy and vulvodynia, HTN, chronic back pain, migraine headaches,  presents with lower quadrant abdominal pain, nausea and generalized weakness for 3 days in the setting of elevated sugars, elevated beta-hydroxybuturate, mild GAP, all concerning for DKA. Hospital stay complicated by fevers, dyspnea/desaturations, and COVID + PCR. ID following along- asked to discontinue the remdesivir and dexamethasone that was empirically started. Rising LFTs- last normal in November admission but have been wax/waning in mild range but have acutely risen in the last several days. No abdominal abdominal pain currently. Imaging earlier on admission including MRI have not demonstrated any focal findings granted very limited study 2/2 to motion. Consider hepatology c/s in AM if continue to rise.
37 y/o woman with PMH of Type I DM on insulin pump, complicated by Diabetic neuropathy and vulvodynia, HTN, chronic back pain and migraine headaches presents with lower quadrant abdominal pain, nausea and generalized weakness for 3 days in the setting of elevated sugars, elevated beta-hydroxybuturate, mild HAGMA, all concerning for DKA which is now resolved. She was waiting for new insulin pump and then became COVID + during this time.     [] COVID 19  - PCR positive 12/31 and 1/4  - 1 episode of high grade fever  - was started on dexamethasone but ID said not necessary  - s/p remdesivir 200mg x1 - now stopped  - on RA and pulse ox is acceptable  -  possibly worsening abdominal pain and diarrhea due to COVID and laxatives  - continue isolation per protocol    [] Intermittent episodes of abdominal Pain, diarrhea (sometimes with some blood) and constipation  today she has worsning of the pain, on exam: the abdomen is soft, mild generalized tenderness, no guarding   she stated that she has still diarrhea, and she passes gases.   one dose of oxycoden 5 mg given   - CT Abdomen and Pelvis w/ IV Cont 12/2/21:  No acute intra-abdominal or pelvic pathology is identified  - seen by GI earlier this admission  - was on miralax and senna - stopped recenty   - follows with Dr. Mejia - recalled GI on 1/6 who recommended to stop laxatives and for outpt f/u - pt with chronic symptoms  - tolerating current diet per staff  -  c diff negative   will check stool culture and fecal calprotectin   - outpt f/u for EGD and colonoscopy per GI - need to rule out IBD  - PPI and maalox for heartburn - pt instructed on antireflux measures    []Hyperkalemia 5.1   lokelma, monitor     [] Transaminitis  - now resolving  - remdesivir stopped  - s/p CLD Work up   - RUQ US normal  - monitor LFTs  - s/p recent MRI of abdomen 12/16   avoid hepatotoxic     [] DKA now resolved  - on lantus 40 units AM, 12 units bedtime, lispro 13 units before meals  - Endocrine f/u appreciated  - awaiting insulin pump delivery - pt needs to use insulin pump at her residence    [] Anxiety Disorder   - started on lexapro 5 mg per psychiatry and increased to 10mg daily    [] Sinus Tachycardia  - improved  - c/w metoprolol     [] DVT ppx: lovenox 40mg subQ daily      PROGRESS NOTE HANDOFF    Pending: insulin pump delivery    pt aware of the plan of care  pt's mother updated on 1/8 on the plan of care ( over the phon)     Disposition: [facility]
Diabetic Ketoacidosis- resolved  - patient treated in the ICU for DKA, now resolved  - followed by Endocrinology, previously on insulin pump  Today she stated that she feels fine with some intermittent abdominal discomfort,   Her mother at bedside- they stated that they insisted to wait for the pump to be delivered   monitor Fs and adjust insulin accordingly - her insulin decreased due to low Fs   consider PT evaluation.  reportedly that the insulin pump will be available on Tuesday
Attending Statement: I have personally performed a face to face diagnostic evaluation on this patient. The patient is suffering from:  DKA   Colitis   I have reviewed the above note and agree with the history, exam and suggestions for care, except as I have noted in the text. I have amended the treatment plans as necessary.
****My note supersedes any discrepancies that may be above in the resident's note***    37 y/o F with PMHx Type I DM on an insulin pump, complicated by Diabetic neuropathy and vulvodynia, HTN, chronic back pain, migraine headaches,  presents with lower quadrant abdominal pain, nausea and generalized weakness for 3 days in the setting of elevated sugars, elevated beta-hydroxybuturate, mild GAP, all concerning for DKA, now resolved, was waiting for d/c upon arrival of new insulin pump but became COVID + while waiting     # COVID-19 PNA  - PCR positive  - 1 episode of high grade fever  - was started on dexamethasone but ID said not necessary  - s/p remdesivir 200mg x1  - on RA and satting well  - asymptomatic currently    # Abdominal Pain, resolved  # Uptrending LFTs, resolved  -CT Abdomen and Pelvis w/ IV Cont 12/2/21:  No acute intra-abdominal or pelvic pathology is identified  - CLD Work up: completed, all labs collected   - LFTs uptrending, stopped RDV, steroids as per ID  - blood cultures 12/31 NGTD  - RUQ US no significant findings  - monitor LFTs  - follow up outpatient with GI For MRI of liver lesions and colonoscopy to r/o ibd     # Diabetic Ketoacidosis, resolved  - patient treated in the ICU for DKA, now resolved  - episode of hypoglycemia, tx w/ dextrose, lispro decreased  - insulin lantus 40 units AM, 12 units bedtime, lispro 13 units before meals, SS  - followed by Endocrinology, previously on insulin pump  - Dr. Wells to arrange insulin pump- requires paperwork?    # Anxiety Disorder   - patient with hx of anxiety- started on lexapro 5 mg per psychiatry   - requests xanax occasionally    # Sinus Tachycardia, resolved  - c/w metoprolol     # Abdominal Pain   # Uptrending LFTs  -CT Abdomen and Pelvis w/ IV Cont 12/2/21:  No acute intra-abdominal or pelvic pathology is identified  - CLD Work up: completed, all labs collected   - RUQ US: no significant findings  - follow up outpatient with GI For MRI of liver lesions and colonoscopy to r/o ibd     Misc:   # DVT ppx: lovenox 40mg subQ daily  # GI ppx: N/A  # Diet: DASH/TLC & CC  # Activity: AAT  # CODE: Full Code  # Dispo: discharge planning    #Progress Note Handoff  Pending (specify): insulin pump and then can return back to [facility].
Attending Statement: I have personally performed a face to face diagnostic evaluation on this patient. The patient is suffering from:  DKA   Colitis   I have reviewed the above note and agree with the history, exam and suggestions for care, except as I have noted in the text. I have amended the treatment plans as necessary.
as above    Anion gap this morning 20 but HCO3 WNL  FS persistently elevated    discussed with resident [id number] to recheck BMP for status of anion gap     need to increase insulin dose    careful monitoring to avoid DKA     hydrate

## 2022-01-08 NOTE — PROVIDER CONTACT NOTE (OTHER) - ACTION/TREATMENT ORDERED:
MD Mireles aware.
MD Mireles aware. Will continue to monitor.
MD to assess at bedside and order pain meds
MD Mireles aware. Covid swab performed, labs ordered, Safety and comfort maintained. Will continue to monitor.
will take a look at med orders

## 2022-01-08 NOTE — PROGRESS NOTE ADULT - SUBJECTIVE AND OBJECTIVE BOX
CRISTI MONTGOMERY 36y Female  MRN#: [id number]     SUBJECTIVE  Patient is a 36y old Female who presents with a chief complaint of Mild DKA (07 Jan 2022 13:46)    Interval/Overnight Events:    Today is hospital day 16d, and this morning she is lying in bed without distress.   No acute overnight events.     OBJECTIVE  PAST MEDICAL & SURGICAL HISTORY  Neuropathy  right lower extremity, vaginal    Type 1 diabetes    Degenerative disc disease, thoracic    Urinary tract infection, recurrent    Gastroesophageal reflux disease, esophagitis presence not specified    Intractable headache, unspecified chronicity pattern, unspecified headache type    Major depressive disorder with single episode, in full remission  previously treated with zyprexa, celexa and lexapro    Tremor of right hand    Tachycardia    Spinal stenosis    No significant past surgical history      ALLERGIES:  amoxicillin (Unknown)  Ceclor (Rash)  ciprofloxacin (Unknown)  clindamycin (Unknown)  flu vaccines (Other)  gabapentin (Unknown)  Influenza Virus Vaccine, H5N1, Inactivated (Unknown)  penicillins (Unknown)    MEDICATIONS:  STANDING MEDICATIONS  amitriptyline Oral Tab/Cap - Peds 75 milliGRAM(s) Oral at bedtime  ammonium lactate 12% Lotion 1 Application(s) Topical every 12 hours  aspirin  chewable 81 milliGRAM(s) Oral daily  chlorhexidine 4% Liquid 1 Application(s) Topical daily  enoxaparin Injectable 40 milliGRAM(s) SubCutaneous daily  escitalopram 10 milliGRAM(s) Oral daily  famotidine    Tablet 40 milliGRAM(s) Oral two times a day  insulin glargine Injectable (LANTUS) 40 Unit(s) SubCutaneous every morning  insulin glargine Injectable (LANTUS) 12 Unit(s) SubCutaneous at bedtime  insulin lispro (ADMELOG) corrective regimen sliding scale   SubCutaneous three times a day before meals  insulin lispro Injectable (ADMELOG) 13 Unit(s) SubCutaneous three times a day before meals  metoprolol tartrate 25 milliGRAM(s) Oral two times a day    PRN MEDICATIONS  acetaminophen     Tablet .. 650 milliGRAM(s) Oral every 6 hours PRN  aluminum hydroxide/magnesium hydroxide/simethicone Suspension 30 milliLiter(s) Oral every 4 hours PRN  benzonatate 100 milliGRAM(s) Oral three times a day PRN  bisacodyl Suppository 10 milliGRAM(s) Rectal daily PRN  bismuth subsalicylate Liquid 10 milliLiter(s) Oral every 8 hours PRN  cyclobenzaprine 10 milliGRAM(s) Oral three times a day PRN  guaifenesin/dextromethorphan Oral Liquid 10 milliLiter(s) Oral every 4 hours PRN    HOME MEDICATIONS  Home Medications:  amitriptyline 75 mg oral tablet: 1 tab(s) orally once a day (at bedtime) (04 Nov 2021 15:06)  ammonium lactate 12% topical cream: Apply topically to affected area 2 times a day (23 Dec 2021 13:53)  aspirin 81 mg oral tablet, chewable: 1 tab(s) orally once a day (28 Dec 2021 09:29)  cyclobenzaprine 10 mg oral tablet: 1 tab(s) orally once a day (at bedtime) (28 Dec 2021 08:33)  metoprolol tartrate 25 mg oral tablet: 1 tab(s) orally 2 times a day (04 Nov 2021 15:06)  multivitamin: 1 tab(s) orally once a day (04 Nov 2021 15:06)      LABS:                        11.1   6.73  )-----------( 418      ( 08 Jan 2022 04:30 )             36.4     01-08    137  |  99  |  9<L>  ----------------------------<  269<H>  5.1<H>   |  21  |  0.6<L>    Ca    9.3      08 Jan 2022 04:30  Mg     1.9     01-08    TPro  7.3  /  Alb  4.3  /  TBili  0.3  /  DBili  x   /  AST  49<H>  /  ALT  132<H>  /  AlkPhos  122<H>  01-08    LIVER FUNCTIONS - ( 08 Jan 2022 04:30 )  Alb: 4.3 g/dL / Pro: 7.3 g/dL / ALK PHOS: 122 U/L / ALT: 132 U/L / AST: 49 U/L / GGT: x                         CAPILLARY BLOOD GLUCOSE      POCT Blood Glucose.: 173 mg/dL (08 Jan 2022 11:18)      PHYSICAL EXAM:  T(C): 36.7 (01-08-22 @ 08:00), Max: 36.9 (01-07-22 @ 15:11)  HR: 89 (01-08-22 @ 08:00) (89 - 111)  BP: 133/82 (01-08-22 @ 08:00) (131/79 - 136/81)  RR: 18 (01-08-22 @ 08:00) (18 - 18)  SpO2: 99% (01-08-22 @ 00:00) (99% - 99%)    GENERAL: NAD, well-developed, 36y  EENT: EOMI, conjunctiva and sclera clear, No nasal obstruction or discharge  RESPIRATORY: Clear to auscultation bilaterally; No wheeze or crackles  CARDIOVASCULAR: Regular rate and rhythm; No murmurs, rubs, or gallops, no pitting edema  GASTROINTESTINAL: Abdomen Soft, Nontender, Nondistended, +BS  MUSCULOSKELETAL:  No cyanosis, extremities grossly symmetrical  PSYCH: AAOx3, affect appropriate  NEUROLOGY: non-focal, cognition grossly intact, MAEE    ADMISSION SUMMARY  Patient is a 36y old Female who presents with a chief complaint of Mild DKA (07 Jan 2022 13:46)

## 2022-01-09 LAB
ALBUMIN SERPL ELPH-MCNC: 4.2 G/DL — SIGNIFICANT CHANGE UP (ref 3.5–5.2)
ALP SERPL-CCNC: 133 U/L — HIGH (ref 30–115)
ALT FLD-CCNC: 112 U/L — HIGH (ref 0–41)
ANION GAP SERPL CALC-SCNC: 14 MMOL/L — SIGNIFICANT CHANGE UP (ref 7–14)
AST SERPL-CCNC: 38 U/L — SIGNIFICANT CHANGE UP (ref 0–41)
BASOPHILS # BLD AUTO: 0.03 K/UL — SIGNIFICANT CHANGE UP (ref 0–0.2)
BASOPHILS NFR BLD AUTO: 0.5 % — SIGNIFICANT CHANGE UP (ref 0–1)
BILIRUB SERPL-MCNC: 0.3 MG/DL — SIGNIFICANT CHANGE UP (ref 0.2–1.2)
BUN SERPL-MCNC: 12 MG/DL — SIGNIFICANT CHANGE UP (ref 10–20)
CALCIUM SERPL-MCNC: 9.3 MG/DL — SIGNIFICANT CHANGE UP (ref 8.5–10.1)
CHLORIDE SERPL-SCNC: 97 MMOL/L — LOW (ref 98–110)
CO2 SERPL-SCNC: 23 MMOL/L — SIGNIFICANT CHANGE UP (ref 17–32)
CREAT SERPL-MCNC: 0.6 MG/DL — LOW (ref 0.7–1.5)
EOSINOPHIL # BLD AUTO: 0.03 K/UL — SIGNIFICANT CHANGE UP (ref 0–0.7)
EOSINOPHIL NFR BLD AUTO: 0.5 % — SIGNIFICANT CHANGE UP (ref 0–8)
GLUCOSE BLDC GLUCOMTR-MCNC: 130 MG/DL — HIGH (ref 70–99)
GLUCOSE BLDC GLUCOMTR-MCNC: 131 MG/DL — HIGH (ref 70–99)
GLUCOSE BLDC GLUCOMTR-MCNC: 141 MG/DL — HIGH (ref 70–99)
GLUCOSE BLDC GLUCOMTR-MCNC: 304 MG/DL — HIGH (ref 70–99)
GLUCOSE BLDC GLUCOMTR-MCNC: 61 MG/DL — LOW (ref 70–99)
GLUCOSE BLDC GLUCOMTR-MCNC: 73 MG/DL — SIGNIFICANT CHANGE UP (ref 70–99)
GLUCOSE SERPL-MCNC: 322 MG/DL — HIGH (ref 70–99)
HCT VFR BLD CALC: 35.1 % — LOW (ref 37–47)
HGB BLD-MCNC: 11.1 G/DL — LOW (ref 12–16)
IMM GRANULOCYTES NFR BLD AUTO: 0.7 % — HIGH (ref 0.1–0.3)
LYMPHOCYTES # BLD AUTO: 1.74 K/UL — SIGNIFICANT CHANGE UP (ref 1.2–3.4)
LYMPHOCYTES # BLD AUTO: 31.5 % — SIGNIFICANT CHANGE UP (ref 20.5–51.1)
MAGNESIUM SERPL-MCNC: 1.8 MG/DL — SIGNIFICANT CHANGE UP (ref 1.8–2.4)
MCHC RBC-ENTMCNC: 26.6 PG — LOW (ref 27–31)
MCHC RBC-ENTMCNC: 31.6 G/DL — LOW (ref 32–37)
MCV RBC AUTO: 84.2 FL — SIGNIFICANT CHANGE UP (ref 81–99)
MONOCYTES # BLD AUTO: 0.49 K/UL — SIGNIFICANT CHANGE UP (ref 0.1–0.6)
MONOCYTES NFR BLD AUTO: 8.9 % — SIGNIFICANT CHANGE UP (ref 1.7–9.3)
NEUTROPHILS # BLD AUTO: 3.2 K/UL — SIGNIFICANT CHANGE UP (ref 1.4–6.5)
NEUTROPHILS NFR BLD AUTO: 57.9 % — SIGNIFICANT CHANGE UP (ref 42.2–75.2)
NRBC # BLD: 0 /100 WBCS — SIGNIFICANT CHANGE UP (ref 0–0)
PLATELET # BLD AUTO: 428 K/UL — HIGH (ref 130–400)
POTASSIUM SERPL-MCNC: 4.9 MMOL/L — SIGNIFICANT CHANGE UP (ref 3.5–5)
POTASSIUM SERPL-SCNC: 4.9 MMOL/L — SIGNIFICANT CHANGE UP (ref 3.5–5)
PROT SERPL-MCNC: 7.2 G/DL — SIGNIFICANT CHANGE UP (ref 6–8)
RBC # BLD: 4.17 M/UL — LOW (ref 4.2–5.4)
RBC # FLD: 14.4 % — SIGNIFICANT CHANGE UP (ref 11.5–14.5)
SODIUM SERPL-SCNC: 134 MMOL/L — LOW (ref 135–146)
WBC # BLD: 5.53 K/UL — SIGNIFICANT CHANGE UP (ref 4.8–10.8)
WBC # FLD AUTO: 5.53 K/UL — SIGNIFICANT CHANGE UP (ref 4.8–10.8)

## 2022-01-09 PROCEDURE — 99232 SBSQ HOSP IP/OBS MODERATE 35: CPT

## 2022-01-09 RX ORDER — KETOROLAC TROMETHAMINE 30 MG/ML
15 SYRINGE (ML) INJECTION ONCE
Refills: 0 | Status: DISCONTINUED | OUTPATIENT
Start: 2022-01-09 | End: 2022-01-09

## 2022-01-09 RX ADMIN — Medication 15 MILLIGRAM(S): at 09:38

## 2022-01-09 RX ADMIN — Medication 75 MILLIGRAM(S): at 21:49

## 2022-01-09 RX ADMIN — Medication 25 MILLIGRAM(S): at 05:05

## 2022-01-09 RX ADMIN — ESCITALOPRAM OXALATE 10 MILLIGRAM(S): 10 TABLET, FILM COATED ORAL at 11:50

## 2022-01-09 RX ADMIN — CYCLOBENZAPRINE HYDROCHLORIDE 10 MILLIGRAM(S): 10 TABLET, FILM COATED ORAL at 21:55

## 2022-01-09 RX ADMIN — Medication 1 APPLICATION(S): at 17:02

## 2022-01-09 RX ADMIN — INSULIN GLARGINE 40 UNIT(S): 100 INJECTION, SOLUTION SUBCUTANEOUS at 07:56

## 2022-01-09 RX ADMIN — CHLORHEXIDINE GLUCONATE 1 APPLICATION(S): 213 SOLUTION TOPICAL at 11:50

## 2022-01-09 RX ADMIN — Medication 81 MILLIGRAM(S): at 11:50

## 2022-01-09 RX ADMIN — Medication 4: at 07:56

## 2022-01-09 RX ADMIN — Medication 13 UNIT(S): at 07:57

## 2022-01-09 RX ADMIN — Medication 13 UNIT(S): at 16:48

## 2022-01-09 RX ADMIN — Medication 650 MILLIGRAM(S): at 23:13

## 2022-01-09 RX ADMIN — Medication 650 MILLIGRAM(S): at 21:49

## 2022-01-09 RX ADMIN — Medication 15 MILLIGRAM(S): at 09:08

## 2022-01-09 RX ADMIN — Medication 25 MILLIGRAM(S): at 17:01

## 2022-01-09 RX ADMIN — FAMOTIDINE 40 MILLIGRAM(S): 10 INJECTION INTRAVENOUS at 05:07

## 2022-01-09 RX ADMIN — Medication 1 APPLICATION(S): at 05:11

## 2022-01-09 RX ADMIN — Medication 13 UNIT(S): at 11:49

## 2022-01-09 RX ADMIN — FAMOTIDINE 40 MILLIGRAM(S): 10 INJECTION INTRAVENOUS at 17:01

## 2022-01-09 RX ADMIN — ENOXAPARIN SODIUM 40 MILLIGRAM(S): 100 INJECTION SUBCUTANEOUS at 11:50

## 2022-01-09 NOTE — PROGRESS NOTE ADULT - SUBJECTIVE AND OBJECTIVE BOX
T H I S   I S    N O  T   A    F I N A L I Z E D   N O T E      CRISTI MONTGOMERY  36y, Female  Allergy: amoxicillin (Unknown)  Ceclor (Rash)  ciprofloxacin (Unknown)  clindamycin (Unknown)  flu vaccines (Other)  gabapentin (Unknown)  Influenza Virus Vaccine, H5N1, Inactivated (Unknown)  penicillins (Unknown)    Hospital Day: 17d    Patient seen and examined earlier today.     PMH/PSH:  PAST MEDICAL & SURGICAL HISTORY:  Neuropathy  right lower extremity, vaginal    Type 1 diabetes    Degenerative disc disease, thoracic    Urinary tract infection, recurrent    Gastroesophageal reflux disease, esophagitis presence not specified    Intractable headache, unspecified chronicity pattern, unspecified headache type    Major depressive disorder with single episode, in full remission  previously treated with zyprexa, celexa and lexapro    Tremor of right hand    Tachycardia    Spinal stenosis    No significant past surgical history        LAST 24-Hr EVENTS:    VITALS:  T(F): 97.9 (01-09-22 @ 08:00), Max: 97.9 (01-09-22 @ 08:00)  HR: 95 (01-09-22 @ 08:00)  BP: 130/72 (01-09-22 @ 08:00) (130/72 - 150/78)  RR: 18 (01-09-22 @ 08:00)  SpO2: 100% (01-09-22 @ 00:14)          TESTS & MEASUREMENTS:  Weight/BMI                            11.1   5.53  )-----------( 428      ( 09 Jan 2022 04:30 )             35.1         01-09    134<L>  |  97<L>  |  12  ----------------------------<  322<H>  4.9   |  23  |  0.6<L>    Ca    9.3      09 Jan 2022 04:30  Mg     1.8     01-09    TPro  7.2  /  Alb  4.2  /  TBili  0.3  /  DBili  x   /  AST  38  /  ALT  112<H>  /  AlkPhos  133<H>  01-09    LIVER FUNCTIONS - ( 09 Jan 2022 04:30 )  Alb: 4.2 g/dL / Pro: 7.2 g/dL / ALK PHOS: 133 U/L / ALT: 112 U/L / AST: 38 U/L / GGT: x                   Procalcitonin, Serum: 0.47 ng/mL (12-31-21 @ 22:44)    D-Dimer Assay, Quantitative: 236 ng/mL DDU (01-02-22 @ 13:20)        COVID-19 PCR: Detected (01-04-22 @ 15:21)        A1C with Estimated Average Glucose Result: 8.6 % (12-25-21 @ 08:05)          RADIOLOGY, ECG, & ADDITIONAL TESTS:  12 Lead ECG:   Ventricular Rate 92 BPM    Atrial Rate 92 BPM    P-R Interval 128 ms    QRS Duration 84 ms    Q-T Interval 396 ms    QTC Calculation(Bazett) 489 ms    P Axis 24 degrees    R Axis 42 degrees    T Axis 73 degrees    Diagnosis Line Normal sinus rhythm  Prolonged QT  Abnormal ECG    Confirmed by Umer Robertson ([id number]) on 1/6/2022 7:46:15 AM (01-05-22 @ 19:41)      RECENT DIAGNOSTIC ORDERS:  Prothrombin Time and INR, Plasma:  Start Date:09-Jan-2022. Routine  Addl Info: Repeat Order: Daily for 10 occurrences (01-09-22 @ 00:00)  Creatinine, Serum: Routine  Addl Info: Repeat Order: Daily for 10 occurrences (01-09-22 @ 00:00)  Hepatic Function Panel: Routine  Addl Info: Repeat Order: Daily for 10 occurrences (01-09-22 @ 00:00)  Calprotectin, Stool: Routine  Specimen Source: feces (01-08-22 @ 17:45)  Culture - Stool: Routine  Specimen Source: Feces (01-08-22 @ 17:45)      MEDICATIONS:  MEDICATIONS  (STANDING):  amitriptyline Oral Tab/Cap - Peds 75 milliGRAM(s) Oral at bedtime  ammonium lactate 12% Lotion 1 Application(s) Topical every 12 hours  aspirin  chewable 81 milliGRAM(s) Oral daily  chlorhexidine 4% Liquid 1 Application(s) Topical daily  enoxaparin Injectable 40 milliGRAM(s) SubCutaneous daily  escitalopram 10 milliGRAM(s) Oral daily  famotidine    Tablet 40 milliGRAM(s) Oral two times a day  insulin glargine Injectable (LANTUS) 40 Unit(s) SubCutaneous every morning  insulin glargine Injectable (LANTUS) 12 Unit(s) SubCutaneous at bedtime  insulin lispro (ADMELOG) corrective regimen sliding scale   SubCutaneous three times a day before meals  insulin lispro Injectable (ADMELOG) 13 Unit(s) SubCutaneous three times a day before meals  metoprolol tartrate 25 milliGRAM(s) Oral two times a day    MEDICATIONS  (PRN):  acetaminophen     Tablet .. 650 milliGRAM(s) Oral every 6 hours PRN Moderate Pain (4 - 6)  aluminum hydroxide/magnesium hydroxide/simethicone Suspension 30 milliLiter(s) Oral every 4 hours PRN Dyspepsia  benzonatate 100 milliGRAM(s) Oral three times a day PRN Cough  bisacodyl Suppository 10 milliGRAM(s) Rectal daily PRN Constipation  bismuth subsalicylate Liquid 10 milliLiter(s) Oral every 8 hours PRN Diarrhea  cyclobenzaprine 10 milliGRAM(s) Oral three times a day PRN Muscle Spasm  guaifenesin/dextromethorphan Oral Liquid 10 milliLiter(s) Oral every 4 hours PRN Cough      HOME MEDICATIONS:  alcohol swabs  (12-28)  amitriptyline 75 mg oral tablet (11-04)  ammonium lactate 12% topical cream (12-23)  aspirin 81 mg oral tablet, chewable (12-28)  Basaglar KwikPen 100 units/mL subcutaneous solution (12-28)  Basaglar KwikPen 100 units/mL subcutaneous solution (12-28)  ClearLax oral powder for reconstitution (12-28)  cyclobenzaprine 10 mg oral tablet (12-28)  glucometer (per patient&#x27;s insurance) (12-28)  glucose tablets (12-28)  HumaLOG KwikPen 100 units/mL injectable solution (12-29)  HumaLOG KwikPen 100 units/mL injectable solution (12-28)  Innerclean oral tablet (12-28)  Insulin Pen Needles, 4mm (12-28)  lancets (12-28)  Lexapro 5 mg oral tablet (01-05)  metoprolol tartrate 25 mg oral tablet (11-04)  multivitamin (11-04)  test strips (per patient&#x27;s insurance) (12-28)      PHYSICAL EXAM:  GENERAL: aler, oriented, NAD   CHEST/LUNG:  CTA B//L   HEART: REGULAR RHYTHM   ABDOMEN: SOFT,VERY MILD TENDERNESS GENERLIZED, NOT DISTENDED   EXTREMITIES:

## 2022-01-09 NOTE — PROGRESS NOTE ADULT - ASSESSMENT
35 y/o woman with PMH of Type I DM on insulin pump, complicated by Diabetic neuropathy and vulvodynia, HTN, chronic back pain and migraine headaches presents with lower quadrant abdominal pain, nausea and generalized weakness for 3 days in the setting of elevated sugars, elevated beta-hydroxybuturate, mild HAGMA, all concerning for DKA which is now resolved. She was waiting for new insulin pump and then became COVID + during this time.     today the patient stated that she feels the same , intermittent abd discomfort and that the oxy did not help.       [] COVID 19  - PCR positive 12/31 and 1/4  - 1 episode of high grade fever  - was started on dexamethasone but ID said not necessary  - s/p remdesivir 200mg x1 - now stopped  - on RA and pulse ox is acceptable  -  possibly worsening abdominal pain and diarrhea due to COVID and laxatives  - continue isolation per protocol    [] Intermittent episodes of abdominal Pain, diarrhea (sometimes with some blood) and constipation    - CT Abdomen and Pelvis w/ IV Cont 12/2/21:  No acute intra-abdominal or pelvic pathology is identified  - seen by GI earlier this admission  - was on miralax and senna - stopped recenty   - follows with Dr. Mejia - recalled GI on 1/6 who recommended to stop laxatives and for outpt f/u - pt with chronic symptoms  - tolerating current diet per staff  -  c diff negative   F/U stool culture and fecal calprotectin   - outpt f/u for EGD and colonoscopy per GI - need to rule out IBD  - PPI and maalox for heartburn - pt instructed on antireflux measures    []Hyperkalemia 5.1   lokelma, monitor     [] Transaminitis  - now resolving  - remdesivir stopped  - s/p CLD Work up   - RUQ US normal  - monitor LFTs  - s/p recent MRI of abdomen 12/16   avoid hepatotoxic     [] DKA now resolved  - on lantus 40 units AM, 12 units bedtime, lispro 13 units before meals  - Endocrine f/u appreciated  - awaiting insulin pump delivery - pt needs to use insulin pump at her residence    [] Anxiety Disorder   - started on lexapro 5 mg per psychiatry and increased to 10mg daily    [] Sinus Tachycardia  - improved  - c/w metoprolol     [] DVT ppx: lovenox 40mg subQ daily      PROGRESS NOTE HANDOFF    Pending:  F/U Stool studies,  insulin pump delivery    pt aware of the plan of care  pt's mother updated on 1/8 on the plan of care ( over the phon)     Disposition: [facility] .

## 2022-01-10 LAB
ALBUMIN SERPL ELPH-MCNC: 4 G/DL — SIGNIFICANT CHANGE UP (ref 3.5–5.2)
ALP SERPL-CCNC: 115 U/L — SIGNIFICANT CHANGE UP (ref 30–115)
ALT FLD-CCNC: 90 U/L — HIGH (ref 0–41)
ANION GAP SERPL CALC-SCNC: 16 MMOL/L — HIGH (ref 7–14)
AST SERPL-CCNC: 34 U/L — SIGNIFICANT CHANGE UP (ref 0–41)
BASOPHILS # BLD AUTO: 0.04 K/UL — SIGNIFICANT CHANGE UP (ref 0–0.2)
BASOPHILS NFR BLD AUTO: 0.9 % — SIGNIFICANT CHANGE UP (ref 0–1)
BILIRUB SERPL-MCNC: 0.3 MG/DL — SIGNIFICANT CHANGE UP (ref 0.2–1.2)
BUN SERPL-MCNC: 9 MG/DL — LOW (ref 10–20)
CALCIUM SERPL-MCNC: 9.2 MG/DL — SIGNIFICANT CHANGE UP (ref 8.5–10.1)
CHLORIDE SERPL-SCNC: 100 MMOL/L — SIGNIFICANT CHANGE UP (ref 98–110)
CO2 SERPL-SCNC: 20 MMOL/L — SIGNIFICANT CHANGE UP (ref 17–32)
CREAT SERPL-MCNC: 0.6 MG/DL — LOW (ref 0.7–1.5)
EOSINOPHIL # BLD AUTO: 0.06 K/UL — SIGNIFICANT CHANGE UP (ref 0–0.7)
EOSINOPHIL NFR BLD AUTO: 1.4 % — SIGNIFICANT CHANGE UP (ref 0–8)
GLUCOSE BLDC GLUCOMTR-MCNC: 118 MG/DL — HIGH (ref 70–99)
GLUCOSE BLDC GLUCOMTR-MCNC: 183 MG/DL — HIGH (ref 70–99)
GLUCOSE BLDC GLUCOMTR-MCNC: 267 MG/DL — HIGH (ref 70–99)
GLUCOSE BLDC GLUCOMTR-MCNC: 70 MG/DL — SIGNIFICANT CHANGE UP (ref 70–99)
GLUCOSE SERPL-MCNC: 278 MG/DL — HIGH (ref 70–99)
HCT VFR BLD CALC: 35.4 % — LOW (ref 37–47)
HGB BLD-MCNC: 11.2 G/DL — LOW (ref 12–16)
IMM GRANULOCYTES NFR BLD AUTO: 0.7 % — HIGH (ref 0.1–0.3)
LYMPHOCYTES # BLD AUTO: 1.98 K/UL — SIGNIFICANT CHANGE UP (ref 1.2–3.4)
LYMPHOCYTES # BLD AUTO: 44.7 % — SIGNIFICANT CHANGE UP (ref 20.5–51.1)
MAGNESIUM SERPL-MCNC: 1.9 MG/DL — SIGNIFICANT CHANGE UP (ref 1.8–2.4)
MCHC RBC-ENTMCNC: 26.4 PG — LOW (ref 27–31)
MCHC RBC-ENTMCNC: 31.6 G/DL — LOW (ref 32–37)
MCV RBC AUTO: 83.3 FL — SIGNIFICANT CHANGE UP (ref 81–99)
MONOCYTES # BLD AUTO: 0.6 K/UL — SIGNIFICANT CHANGE UP (ref 0.1–0.6)
MONOCYTES NFR BLD AUTO: 13.5 % — HIGH (ref 1.7–9.3)
NEUTROPHILS # BLD AUTO: 1.72 K/UL — SIGNIFICANT CHANGE UP (ref 1.4–6.5)
NEUTROPHILS NFR BLD AUTO: 38.8 % — LOW (ref 42.2–75.2)
NRBC # BLD: 0 /100 WBCS — SIGNIFICANT CHANGE UP (ref 0–0)
PLATELET # BLD AUTO: 378 K/UL — SIGNIFICANT CHANGE UP (ref 130–400)
POTASSIUM SERPL-MCNC: 4.8 MMOL/L — SIGNIFICANT CHANGE UP (ref 3.5–5)
POTASSIUM SERPL-SCNC: 4.8 MMOL/L — SIGNIFICANT CHANGE UP (ref 3.5–5)
PROT SERPL-MCNC: 7 G/DL — SIGNIFICANT CHANGE UP (ref 6–8)
RBC # BLD: 4.25 M/UL — SIGNIFICANT CHANGE UP (ref 4.2–5.4)
RBC # FLD: 14.3 % — SIGNIFICANT CHANGE UP (ref 11.5–14.5)
SODIUM SERPL-SCNC: 136 MMOL/L — SIGNIFICANT CHANGE UP (ref 135–146)
WBC # BLD: 4.43 K/UL — LOW (ref 4.8–10.8)
WBC # FLD AUTO: 4.43 K/UL — LOW (ref 4.8–10.8)

## 2022-01-10 PROCEDURE — 99232 SBSQ HOSP IP/OBS MODERATE 35: CPT

## 2022-01-10 RX ORDER — KETOROLAC TROMETHAMINE 30 MG/ML
15 SYRINGE (ML) INJECTION ONCE
Refills: 0 | Status: DISCONTINUED | OUTPATIENT
Start: 2022-01-10 | End: 2022-01-10

## 2022-01-10 RX ORDER — INSULIN LISPRO 100/ML
10 VIAL (ML) SUBCUTANEOUS
Refills: 0 | Status: DISCONTINUED | OUTPATIENT
Start: 2022-01-10 | End: 2022-01-11

## 2022-01-10 RX ADMIN — FAMOTIDINE 40 MILLIGRAM(S): 10 INJECTION INTRAVENOUS at 05:06

## 2022-01-10 RX ADMIN — Medication 15 MILLIGRAM(S): at 06:06

## 2022-01-10 RX ADMIN — FAMOTIDINE 40 MILLIGRAM(S): 10 INJECTION INTRAVENOUS at 17:25

## 2022-01-10 RX ADMIN — ENOXAPARIN SODIUM 40 MILLIGRAM(S): 100 INJECTION SUBCUTANEOUS at 12:25

## 2022-01-10 RX ADMIN — CHLORHEXIDINE GLUCONATE 1 APPLICATION(S): 213 SOLUTION TOPICAL at 12:24

## 2022-01-10 RX ADMIN — Medication 1 APPLICATION(S): at 17:26

## 2022-01-10 RX ADMIN — Medication 10 UNIT(S): at 12:23

## 2022-01-10 RX ADMIN — Medication 30 MILLILITER(S): at 15:05

## 2022-01-10 RX ADMIN — Medication 81 MILLIGRAM(S): at 12:23

## 2022-01-10 RX ADMIN — Medication 10 UNIT(S): at 17:20

## 2022-01-10 RX ADMIN — Medication 3: at 08:01

## 2022-01-10 RX ADMIN — Medication 1: at 12:21

## 2022-01-10 RX ADMIN — Medication 13 UNIT(S): at 08:03

## 2022-01-10 RX ADMIN — CYCLOBENZAPRINE HYDROCHLORIDE 10 MILLIGRAM(S): 10 TABLET, FILM COATED ORAL at 22:21

## 2022-01-10 RX ADMIN — ESCITALOPRAM OXALATE 10 MILLIGRAM(S): 10 TABLET, FILM COATED ORAL at 12:25

## 2022-01-10 RX ADMIN — Medication 25 MILLIGRAM(S): at 17:25

## 2022-01-10 RX ADMIN — INSULIN GLARGINE 40 UNIT(S): 100 INJECTION, SOLUTION SUBCUTANEOUS at 13:49

## 2022-01-10 RX ADMIN — Medication 25 MILLIGRAM(S): at 05:06

## 2022-01-10 RX ADMIN — Medication 1 APPLICATION(S): at 05:09

## 2022-01-10 RX ADMIN — Medication 75 MILLIGRAM(S): at 22:21

## 2022-01-10 NOTE — PROGRESS NOTE ADULT - ASSESSMENT
35 y/o woman with PMH of Type I DM on insulin pump, complicated by Diabetic neuropathy and vulvodynia, HTN, chronic back pain and migraine headaches presents with lower quadrant abdominal pain, nausea and generalized weakness for 3 days in the setting of elevated sugars, elevated beta-hydroxybuturate, mild HAGMA, all concerning for DKA which is now resolved. She was waiting for new insulin pump and then became COVID + during this time.     1. COVID 19  - PCR positive 12/31 and 1/4  - 1 episode of high grade fever  - was started on dexamethasone but ID said not necessary  - s/p remdesivir 200mg x1 - now stopped  - on RA and pulse ox is acceptable  - possibly worsening abdominal pain and diarrhea due to COVID and laxatives  - continue isolation per protocol    2. Intermittent episodes of abdominal Pain, diarrhea (sometimes with some blood) and constipation  - CT Abdomen and Pelvis w/ IV Cont 12/2/21:  No acute intra-abdominal or pelvic pathology is identified  - seen by GI earlier this admission  - on miralax and senna - now stopped  - follows with Dr. Mejia - recalled GI on 1/6 who recommended to stop laxatives and for outpt f/u - pt with chronic symptoms  - tolerating current diet per staff  - check pending stool studies  - outpt f/u for EGD and colonoscopy per GI - need to rule out IBD  - PPI and maalox for heartburn - pt instructed on antireflux measures    3. Transaminitis  - now resolving  - remdesivir stopped  - s/p CLD Work up   - RUQ US normal  - monitor LFTs  - s/p recent MRI of abdomen 12/16     4. DKA now resolved  - on lantus 40 units AM, 12 units bedtime  - decreased lispro to 10 units before meals  - Endocrine f/u appreciated  - awaiting insulin pump delivery - pt needs to use insulin pump at her residence    5. Anxiety Disorder   - started on lexapro 5 mg per psychiatry and increased to 10mg daily    6. Sinus Tachycardia  - improved  - c/w metoprolol     7. DVT ppx: lovenox 40mg subQ daily      PROGRESS NOTE HANDOFF    Pending: insulin pump delivery    pt aware of the plan of care  communication team will update mother on the plan of care    Disposition: [facility]

## 2022-01-10 NOTE — PROGRESS NOTE ADULT - ASSESSMENT
37 y/o F with PMHx Type I DM on an insulin pump, complicated by Diabetic neuropathy and vulvodynia, HTN, chronic back pain, migraine headaches,  presents with lower quadrant abdominal pain, nausea and generalized weakness for 3 days in the setting of elevated sugars, elevated beta-hydroxybuturate, mild GAP, all concerning for DKA, now resolved, was waiting for d/c upon arrival of new insulin pump but became COVID + while waiting. Pt initially required oxygen support w/ NC, but currently on room air, asymptomatic. Pt is awaiting delivery of insulin pump.    # COVID-19 PNA  - PCR positive 12/31 and 1/4  - 1 episode of high grade fever  - was started on dexamethasone but ID said not necessary  - s/p remdesivir 200mg x1 - now stopped  - on RA and pulse ox is acceptable  - possibly worsening abdominal pain and diarrhea due to COVID and laxatives  - continue isolation per protocol  - c/w Robitussin 10mL Q4 PRN Cough  - c/w Benzonatate 100mg Oral three times a day PRN Cough      # Abdominal Pain, resolved  # Uptrending LFTs, resolved  -CT Abdomen and Pelvis w/ IV Cont 12/2/21:  No acute intra-abdominal or pelvic pathology is identified  - CLD Work up: completed, all labs collected   - LFTs uptrending, stopped RDV, steroids as per ID  - blood cultures 12/31 NGTD  - RUQ US no significant findings  - stopped miralax ,senna due to complaints of multiple episodes of diarrhea  - pepto-bismol for cramping, diarrhea  - monitor LFTs  - follow up outpatient with GI For MRI of liver lesions and colonoscopy to r/o IBD    # Diabetic Ketoacidosis, resolved  - patient treated in the ICU for DKA, now resolved  - episode of hypoglycemia, tx w/ dextrose, lispro decreased  - insulin lantus 40 units AM, 13 units bedtime, lispro 10 units before meals, SS  - followed by Endocrinology, previously on insulin pump however admitted for DKA likely because of pump malfunctioning, patient unable to take insulin by pen because of policy where she lives (can not take injection and there is not nurse on site 24 hours  - Pending delivery of new pump sent to [facility] [employer] and will be delivered to the hospital and to have virtual training to be trained on new pump    # Anxiety Disorder   - patient with hx of anxiety- started on lexapro 5 mg per psychiatry   - requests xanax occasionally    # Sinus Tachycardia, resolved  - c/w metoprolol     # Abdominal Pain   # Uptrending LFTs  -CT Abdomen and Pelvis w/ IV Cont 12/2/21:  No acute intra-abdominal or pelvic pathology is identified  - CLD Work up: completed, all labs collected   - RUQ US: no significant findings  - follow up outpatient with GI For MRI of liver lesions and colonoscopy to r/o ibd     Misc:   # DVT ppx: lovenox 40mg subQ daily  # GI ppx: N/A  # Diet: DASH/TLC & CC  # Activity: AAT  # CODE: Full Code  # Dispo: discharge planning, awaiting insulin pump delivery

## 2022-01-10 NOTE — PROGRESS NOTE ADULT - ASSESSMENT
type 1 diabetes, needs training on insulin pump, this can only be done as an outpatient, so patient can be discharged to nursing home today on basaglar kwik pen as insulin glargine , and admelog solostar pen as insulin lispro, please reduce insulin lisprio to 10 units before meals.. please call dr perry . patient will not know how to use insulin pump without outpatient training and this will require several training sessions.

## 2022-01-10 NOTE — PROGRESS NOTE ADULT - SUBJECTIVE AND OBJECTIVE BOX
Reason for Endocrinology Consult: Diabetes    HPI: 36y Female      PAST MEDICAL & SURGICAL HISTORY:  Neuropathy  right lower extremity, vaginal    Type 1 diabetes    Degenerative disc disease, thoracic    Urinary tract infection, recurrent    Gastroesophageal reflux disease, esophagitis presence not specified    Intractable headache, unspecified chronicity pattern, unspecified headache type    Major depressive disorder with single episode, in full remission  previously treated with zyprexa, celexa and lexapro    Tremor of right hand    Tachycardia    Spinal stenosis    No significant past surgical history      FAMILY HISTORY:      SH:  Smoking  Etoh:  Recreational Drugs:    Home Medications:  amitriptyline 75 mg oral tablet: 1 tab(s) orally once a day (at bedtime) (04 Nov 2021 15:06)  ammonium lactate 12% topical cream: Apply topically to affected area 2 times a day (23 Dec 2021 13:53)  aspirin 81 mg oral tablet, chewable: 1 tab(s) orally once a day (28 Dec 2021 09:29)  cyclobenzaprine 10 mg oral tablet: 1 tab(s) orally once a day (at bedtime) (28 Dec 2021 08:33)  metoprolol tartrate 25 mg oral tablet: 1 tab(s) orally 2 times a day (04 Nov 2021 15:06)  multivitamin: 1 tab(s) orally once a day (04 Nov 2021 15:06)      Current (Non-Endocrine) Meds:  acetaminophen     Tablet .. 650 milliGRAM(s) Oral every 6 hours PRN  aluminum hydroxide/magnesium hydroxide/simethicone Suspension 30 milliLiter(s) Oral every 4 hours PRN  amitriptyline Oral Tab/Cap - Peds 75 milliGRAM(s) Oral at bedtime  ammonium lactate 12% Lotion 1 Application(s) Topical every 12 hours  aspirin  chewable 81 milliGRAM(s) Oral daily  benzonatate 100 milliGRAM(s) Oral three times a day PRN  bisacodyl Suppository 10 milliGRAM(s) Rectal daily PRN  bismuth subsalicylate Liquid 10 milliLiter(s) Oral every 8 hours PRN  chlorhexidine 4% Liquid 1 Application(s) Topical daily  cyclobenzaprine 10 milliGRAM(s) Oral three times a day PRN  enoxaparin Injectable 40 milliGRAM(s) SubCutaneous daily  escitalopram 10 milliGRAM(s) Oral daily  famotidine    Tablet 40 milliGRAM(s) Oral two times a day  guaifenesin/dextromethorphan Oral Liquid 10 milliLiter(s) Oral every 4 hours PRN  metoprolol tartrate 25 milliGRAM(s) Oral two times a day      Current Endocrine Meds:   insulin glargine Injectable (LANTUS) 12 Unit(s) SubCutaneous at bedtime  insulin glargine Injectable (LANTUS) 40 Unit(s) SubCutaneous every morning  insulin lispro (ADMELOG) corrective regimen sliding scale   SubCutaneous three times a day before meals  insulin lispro Injectable (ADMELOG) 13 Unit(s) SubCutaneous three times a day before meals      Allergies:  amoxicillin (Unknown)  Ceclor (Rash)  ciprofloxacin (Unknown)  clindamycin (Unknown)  flu vaccines (Other)  gabapentin (Unknown)  Influenza Virus Vaccine, H5N1, Inactivated (Unknown)  penicillins (Unknown)      ROS:  Denies the following except as indicated.    General: weight loss/weight gain, decreased appetite, fatigue, fever  Eyes: blurry vision, double vision  ENT: neck swelling, dysphagia, voice changes   CV: palpitations, SOB, chest pain, cough  GI: nausea, vomiting, diarrhea, constipation, abdominal pain  : nocturia,  polyuria, dysuria  Endo: decreased libido, heat/cold intolerance, jitteriness  MSK: arthralgias, myalgias  Skin: rash, dryness, diaphoresis  Neuro: pedal numbness,pedal paresthesias, pedal pain    Height (cm): 162.6 (01-05 @ 11:23)    Vital Signs Last 24 Hrs  T(C): 36.6 (10 Brayden 2022 08:00), Max: 37 (09 Jan 2022 15:00)  T(F): 97.9 (10 Brayden 2022 08:00), Max: 98.6 (09 Jan 2022 15:00)  HR: 92 (10 Brayden 2022 08:00) (92 - 101)  BP: 124/78 (10 Brayden 2022 08:00) (124/78 - 134/68)  BP(mean): --  RR: 18 (10 Brayden 2022 08:00) (18 - 18)  SpO2: 100% (10 Brayden 2022 08:00) (100% - 100%)  Constitutional: WN/WD in NAD.   Neck: no thyromegaly or palpable thyroid nodules   Respiratory: lungs CTAB.  Cardiovascular: regular rate and rhythm, normal S1 and S2, no audible murmurs  GI: soft, NT/ND, no masses/HSM appreciated.  Ext: no edema, no ulcers, pedal pulses palpable bilaterally  Neurology: no tremor, monofilament sensation intact in feet  Psychiatric: A&O x 3, normal affect/mood.        LABS:                        11.2   4.43  )-----------( 378      ( 10 Brayden 2022 06:35 )             35.4     01-10    136  |  100  |  9<L>  ----------------------------<  278<H>  4.8   |  20  |  0.6<L>    Ca    9.2      10 Brayden 2022 06:35  Mg     1.9     01-10    TPro  7.0  /  Alb  4.0  /  TBili  0.3  /  DBili  x   /  AST  34  /  ALT  90<H>  /  AlkPhos  115  01-10                                       RADIOLOGY & ADDITIONAL STUDIES:    A/P:36yFemale      Pt can follow up at discharge with:    Above discussed with resident.

## 2022-01-10 NOTE — CHART NOTE - NSCHARTNOTEFT_GEN_A_CORE
I attended COVID rounds with the Hospitalist and housestaff and called family.    [  ] I attempted to reach                            but was unable to leave a message  [ x] I left a message with mother Argelia [phone]  [  ] I reached                              and gave an update on the patient's condition

## 2022-01-10 NOTE — PROGRESS NOTE ADULT - SUBJECTIVE AND OBJECTIVE BOX
CRISTI MONTGOMERY  36y Female    INTERVAL HPI/OVERNIGHT EVENTS:    Pt still with abdominal cramping and nausea at times  states she vomited earlier  no SOB, fever, cough    T(F): 97.9 (01-10-22 @ 08:00), Max: 98.6 (01-09-22 @ 15:00)  HR: 92 (01-10-22 @ 08:00) (92 - 101)  BP: 124/78 (01-10-22 @ 08:00) (124/78 - 134/68)  RR: 18 (01-10-22 @ 08:00) (18 - 18)  SpO2: 100% (01-10-22 @ 08:00) (100% - 100%) on RA      I&O's Summary    CAPILLARY BLOOD GLUCOSE      POCT Blood Glucose.: 183 mg/dL (10 Brayden 2022 11:18)  POCT Blood Glucose.: 267 mg/dL (10 Brayden 2022 07:43)  POCT Blood Glucose.: 131 mg/dL (09 Jan 2022 22:33)  POCT Blood Glucose.: 61 mg/dL (09 Jan 2022 20:51)  POCT Blood Glucose.: 130 mg/dL (09 Jan 2022 16:45)  POCT Blood Glucose.: 73 mg/dL (09 Jan 2022 14:40)        PHYSICAL EXAM:  GENERAL: NAD  HEAD:  Normocephalic  EYES:  conjunctiva and sclera clear  ENMT: Moist mucous membranes  NERVOUS SYSTEM:  Alert, awake, Good concentration  CHEST/LUNG: CTA b/l  HEART: Regular rate and rhythms  ABDOMEN: Soft, tender in lower quadrants, Nondistended; Bowel sounds present  EXTREMITIES:   No edema    Consultant(s) Notes Reviewed:  [x ] YES  [ ] NO  Care Discussed with Consultants/Other Providers [ x] YES  [ ] NO    MEDICATIONS  (STANDING):  amitriptyline Oral Tab/Cap - Peds 75 milliGRAM(s) Oral at bedtime  ammonium lactate 12% Lotion 1 Application(s) Topical every 12 hours  aspirin  chewable 81 milliGRAM(s) Oral daily  chlorhexidine 4% Liquid 1 Application(s) Topical daily  enoxaparin Injectable 40 milliGRAM(s) SubCutaneous daily  escitalopram 10 milliGRAM(s) Oral daily  famotidine    Tablet 40 milliGRAM(s) Oral two times a day  insulin glargine Injectable (LANTUS) 40 Unit(s) SubCutaneous every morning  insulin glargine Injectable (LANTUS) 12 Unit(s) SubCutaneous at bedtime  insulin lispro (ADMELOG) corrective regimen sliding scale   SubCutaneous three times a day before meals  insulin lispro Injectable (ADMELOG) 10 Unit(s) SubCutaneous three times a day before meals  metoprolol tartrate 25 milliGRAM(s) Oral two times a day    MEDICATIONS  (PRN):  acetaminophen     Tablet .. 650 milliGRAM(s) Oral every 6 hours PRN Moderate Pain (4 - 6)  aluminum hydroxide/magnesium hydroxide/simethicone Suspension 30 milliLiter(s) Oral every 4 hours PRN Dyspepsia  benzonatate 100 milliGRAM(s) Oral three times a day PRN Cough  bisacodyl Suppository 10 milliGRAM(s) Rectal daily PRN Constipation  bismuth subsalicylate Liquid 10 milliLiter(s) Oral every 8 hours PRN Diarrhea  cyclobenzaprine 10 milliGRAM(s) Oral three times a day PRN Muscle Spasm  guaifenesin/dextromethorphan Oral Liquid 10 milliLiter(s) Oral every 4 hours PRN Cough      LABS:                        11.2   4.43  )-----------( 378      ( 10 Brayden 2022 06:35 )             35.4     01-10    136  |  100  |  9<L>  ----------------------------<  278<H>  4.8   |  20  |  0.6<L>    Ca    9.2      10 Brayden 2022 06:35  Mg     1.9     01-10    TPro  7.0  /  Alb  4.0  /  TBili  0.3  /  DBili  x   /  AST  34  /  ALT  90<H>  /  AlkPhos  115  01-10          COVID-19 PCR: Detected (04 Jan 2022 15:21)  COVID-19 PCR: Detected (31 Dec 2021 21:46)  COVID-19 PCR: NotDetec (28 Dec 2021 09:46)  COVID-19 PCR: NotDetec (23 Dec 2021 09:56)  COVID-19 PCR: NotDetec (29 Nov 2021 10:00)  COVID-19 PCR: NotDetec (19 Nov 2021 10:00)  SARS-CoV-2: NotDetec (13 Nov 2021 13:25)  COVID-19 PCR: NotDetec (03 Oct 2021 14:45)    Ferritin, Serum: 9 ng/mL (12-23-21 @ 16:52)                  Case discussed with residents and RN on rounds today    Care discussed with pt

## 2022-01-10 NOTE — PROGRESS NOTE ADULT - SUBJECTIVE AND OBJECTIVE BOX
----------Daily Progress Note----------    HISTORY OF PRESENT ILLNESS:  Patient is a 36y old Female who presents with a chief complaint of Mild DKA (10 Brayden 2022 13:48)    Currently admitted to medicine with the primary diagnosis of DKA (diabetic ketoacidosis)       Today is hospital day 18d.     INTERVAL HOSPITAL COURSE / OVERNIGHT EVENTS:    Patient was examined and seen at bedside. This morning she is resting comfortably in bed and reports no new issues or overnight events. Patient pending delivery of insulin pump.    Review of Systems: Otherwise unremarkable     <<<<<PAST MEDICAL & SURGICAL HISTORY>>>>>  Neuropathy  right lower extremity, vaginal    Type 1 diabetes    Degenerative disc disease, thoracic    Urinary tract infection, recurrent    Gastroesophageal reflux disease, esophagitis presence not specified    Intractable headache, unspecified chronicity pattern, unspecified headache type    Major depressive disorder with single episode, in full remission  previously treated with zyprexa, celexa and lexapro    Tremor of right hand    Tachycardia    Spinal stenosis    No significant past surgical history      ALLERGIES  amoxicillin (Unknown)  Ceclor (Rash)  ciprofloxacin (Unknown)  clindamycin (Unknown)  flu vaccines (Other)  gabapentin (Unknown)  Influenza Virus Vaccine, H5N1, Inactivated (Unknown)  penicillins (Unknown)      Home Medications:  amitriptyline 75 mg oral tablet: 1 tab(s) orally once a day (at bedtime) (04 Nov 2021 15:06)  ammonium lactate 12% topical cream: Apply topically to affected area 2 times a day (23 Dec 2021 13:53)  aspirin 81 mg oral tablet, chewable: 1 tab(s) orally once a day (28 Dec 2021 09:29)  cyclobenzaprine 10 mg oral tablet: 1 tab(s) orally once a day (at bedtime) (28 Dec 2021 08:33)  metoprolol tartrate 25 mg oral tablet: 1 tab(s) orally 2 times a day (04 Nov 2021 15:06)  multivitamin: 1 tab(s) orally once a day (04 Nov 2021 15:06)        MEDICATIONS  STANDING MEDICATIONS  amitriptyline Oral Tab/Cap - Peds 75 milliGRAM(s) Oral at bedtime  ammonium lactate 12% Lotion 1 Application(s) Topical every 12 hours  aspirin  chewable 81 milliGRAM(s) Oral daily  chlorhexidine 4% Liquid 1 Application(s) Topical daily  enoxaparin Injectable 40 milliGRAM(s) SubCutaneous daily  escitalopram 10 milliGRAM(s) Oral daily  famotidine    Tablet 40 milliGRAM(s) Oral two times a day  insulin glargine Injectable (LANTUS) 12 Unit(s) SubCutaneous at bedtime  insulin glargine Injectable (LANTUS) 40 Unit(s) SubCutaneous every morning  insulin lispro (ADMELOG) corrective regimen sliding scale   SubCutaneous three times a day before meals  insulin lispro Injectable (ADMELOG) 10 Unit(s) SubCutaneous three times a day before meals  metoprolol tartrate 25 milliGRAM(s) Oral two times a day    PRN MEDICATIONS  acetaminophen     Tablet .. 650 milliGRAM(s) Oral every 6 hours PRN  aluminum hydroxide/magnesium hydroxide/simethicone Suspension 30 milliLiter(s) Oral every 4 hours PRN  benzonatate 100 milliGRAM(s) Oral three times a day PRN  bisacodyl Suppository 10 milliGRAM(s) Rectal daily PRN  bismuth subsalicylate Liquid 10 milliLiter(s) Oral every 8 hours PRN  cyclobenzaprine 10 milliGRAM(s) Oral three times a day PRN  guaifenesin/dextromethorphan Oral Liquid 10 milliLiter(s) Oral every 4 hours PRN    VITALS:  T(F): 97.9  HR: 92  BP: 124/78  RR: 18  SpO2: 100%    <<<<<LABS>>>>>                        11.2   4.43  )-----------( 378      ( 10 Brayden 2022 06:35 )             35.4     01-10    136  |  100  |  9<L>  ----------------------------<  278<H>  4.8   |  20  |  0.6<L>    Ca    9.2      10 Brayden 2022 06:35  Mg     1.9     01-10    TPro  7.0  /  Alb  4.0  /  TBili  0.3  /  DBili  x   /  AST  34  /  ALT  90<H>  /  AlkPhos  115  01-10            [id number]        <<<<<RADIOLOGY>>>>>    <<<<<PHYSICAL EXAM>>>>>  GENERAL: Well developed, well nourished and in no acute distress. Resting comfortably in bed.  HEENT: Normocephalic, atraumatic, mucous membranes moist, EOMI, PERRLA, bilateral sclera anicteric, no conjunctival injection  Neck: Supple, non-tender, no lymphadenopathy.  PULMONARY: Clear to auscultation bilaterally. No rales, rhonchi, or wheezing.  CARDIOVASCULAR: Regular rate and rhythm, S1-S2, no murmurs  GASTROINTESTINAL: Soft, non-tender, non-distended, no guarding.  RENAL: No CVA tenderness.  SKIN/EXTREMITIES: No clubbing or edema  NEUROLOGIC/MUSCULOSKELETAL: AOx4, grossly moving all extremities, no focal deficits.      -----------------------------------------------------------------------------------------------------------------------------------------------------------------------------------------------

## 2022-01-11 LAB
ANION GAP SERPL CALC-SCNC: 15 MMOL/L — HIGH (ref 7–14)
BASOPHILS # BLD AUTO: 0.03 K/UL — SIGNIFICANT CHANGE UP (ref 0–0.2)
BASOPHILS NFR BLD AUTO: 0.6 % — SIGNIFICANT CHANGE UP (ref 0–1)
BUN SERPL-MCNC: 12 MG/DL — SIGNIFICANT CHANGE UP (ref 10–20)
CALCIUM SERPL-MCNC: 9.3 MG/DL — SIGNIFICANT CHANGE UP (ref 8.5–10.1)
CALPROTECTIN STL-MCNT: 96 UG/G — SIGNIFICANT CHANGE UP (ref 0–120)
CHLORIDE SERPL-SCNC: 100 MMOL/L — SIGNIFICANT CHANGE UP (ref 98–110)
CO2 SERPL-SCNC: 23 MMOL/L — SIGNIFICANT CHANGE UP (ref 17–32)
CREAT SERPL-MCNC: 0.6 MG/DL — LOW (ref 0.7–1.5)
CULTURE RESULTS: SIGNIFICANT CHANGE UP
EOSINOPHIL # BLD AUTO: 0.07 K/UL — SIGNIFICANT CHANGE UP (ref 0–0.7)
EOSINOPHIL NFR BLD AUTO: 1.3 % — SIGNIFICANT CHANGE UP (ref 0–8)
GLUCOSE BLDC GLUCOMTR-MCNC: 133 MG/DL — HIGH (ref 70–99)
GLUCOSE BLDC GLUCOMTR-MCNC: 140 MG/DL — HIGH (ref 70–99)
GLUCOSE BLDC GLUCOMTR-MCNC: 150 MG/DL — HIGH (ref 70–99)
GLUCOSE BLDC GLUCOMTR-MCNC: 235 MG/DL — HIGH (ref 70–99)
GLUCOSE BLDC GLUCOMTR-MCNC: 64 MG/DL — LOW (ref 70–99)
GLUCOSE SERPL-MCNC: 189 MG/DL — HIGH (ref 70–99)
HCT VFR BLD CALC: 34.6 % — LOW (ref 37–47)
HGB BLD-MCNC: 10.9 G/DL — LOW (ref 12–16)
IMM GRANULOCYTES NFR BLD AUTO: 0.4 % — HIGH (ref 0.1–0.3)
LYMPHOCYTES # BLD AUTO: 2.16 K/UL — SIGNIFICANT CHANGE UP (ref 1.2–3.4)
LYMPHOCYTES # BLD AUTO: 40.7 % — SIGNIFICANT CHANGE UP (ref 20.5–51.1)
MAGNESIUM SERPL-MCNC: 1.9 MG/DL — SIGNIFICANT CHANGE UP (ref 1.8–2.4)
MCHC RBC-ENTMCNC: 26.2 PG — LOW (ref 27–31)
MCHC RBC-ENTMCNC: 31.5 G/DL — LOW (ref 32–37)
MCV RBC AUTO: 83.2 FL — SIGNIFICANT CHANGE UP (ref 81–99)
MONOCYTES # BLD AUTO: 0.71 K/UL — HIGH (ref 0.1–0.6)
MONOCYTES NFR BLD AUTO: 13.4 % — HIGH (ref 1.7–9.3)
NEUTROPHILS # BLD AUTO: 2.32 K/UL — SIGNIFICANT CHANGE UP (ref 1.4–6.5)
NEUTROPHILS NFR BLD AUTO: 43.6 % — SIGNIFICANT CHANGE UP (ref 42.2–75.2)
NRBC # BLD: 0 /100 WBCS — SIGNIFICANT CHANGE UP (ref 0–0)
PLATELET # BLD AUTO: 431 K/UL — HIGH (ref 130–400)
POTASSIUM SERPL-MCNC: 4.8 MMOL/L — SIGNIFICANT CHANGE UP (ref 3.5–5)
POTASSIUM SERPL-SCNC: 4.8 MMOL/L — SIGNIFICANT CHANGE UP (ref 3.5–5)
RBC # BLD: 4.16 M/UL — LOW (ref 4.2–5.4)
RBC # FLD: 14.3 % — SIGNIFICANT CHANGE UP (ref 11.5–14.5)
SARS-COV-2 RNA SPEC QL NAA+PROBE: DETECTED
SODIUM SERPL-SCNC: 138 MMOL/L — SIGNIFICANT CHANGE UP (ref 135–146)
SPECIMEN SOURCE: SIGNIFICANT CHANGE UP
WBC # BLD: 5.31 K/UL — SIGNIFICANT CHANGE UP (ref 4.8–10.8)
WBC # FLD AUTO: 5.31 K/UL — SIGNIFICANT CHANGE UP (ref 4.8–10.8)

## 2022-01-11 PROCEDURE — 99232 SBSQ HOSP IP/OBS MODERATE 35: CPT

## 2022-01-11 RX ORDER — ISOPROPYL ALCOHOL, BENZOCAINE .7; .06 ML/ML; ML/ML
1 SWAB TOPICAL
Qty: 100 | Refills: 1
Start: 2022-01-11 | End: 2022-03-01

## 2022-01-11 RX ORDER — FAMOTIDINE 10 MG/ML
1 INJECTION INTRAVENOUS
Qty: 0 | Refills: 0 | DISCHARGE
Start: 2022-01-11

## 2022-01-11 RX ORDER — GUAIFENESIN/DEXTROMETHORPHAN 600MG-30MG
10 TABLET, EXTENDED RELEASE 12 HR ORAL
Qty: 50 | Refills: 0
Start: 2022-01-11 | End: 2022-01-17

## 2022-01-11 RX ORDER — KETOROLAC TROMETHAMINE 30 MG/ML
15 SYRINGE (ML) INJECTION ONCE
Refills: 0 | Status: DISCONTINUED | OUTPATIENT
Start: 2022-01-11 | End: 2022-01-11

## 2022-01-11 RX ORDER — GUAIFENESIN/DEXTROMETHORPHAN 600MG-30MG
10 TABLET, EXTENDED RELEASE 12 HR ORAL
Qty: 0 | Refills: 0 | DISCHARGE
Start: 2022-01-11

## 2022-01-11 RX ORDER — KETOROLAC TROMETHAMINE 30 MG/ML
15 SYRINGE (ML) INJECTION EVERY 6 HOURS
Refills: 0 | Status: DISCONTINUED | OUTPATIENT
Start: 2022-01-11 | End: 2022-01-11

## 2022-01-11 RX ORDER — ONDANSETRON 8 MG/1
4 TABLET, FILM COATED ORAL EVERY 6 HOURS
Refills: 0 | Status: DISCONTINUED | OUTPATIENT
Start: 2022-01-11 | End: 2022-01-11

## 2022-01-11 RX ORDER — IBUPROFEN 200 MG
200 TABLET ORAL EVERY 6 HOURS
Refills: 0 | Status: DISCONTINUED | OUTPATIENT
Start: 2022-01-11 | End: 2022-01-12

## 2022-01-11 RX ORDER — ONDANSETRON 8 MG/1
4 TABLET, FILM COATED ORAL EVERY 6 HOURS
Refills: 0 | Status: DISCONTINUED | OUTPATIENT
Start: 2022-01-11 | End: 2022-01-12

## 2022-01-11 RX ADMIN — FAMOTIDINE 40 MILLIGRAM(S): 10 INJECTION INTRAVENOUS at 17:37

## 2022-01-11 RX ADMIN — Medication 1 APPLICATION(S): at 05:41

## 2022-01-11 RX ADMIN — FAMOTIDINE 40 MILLIGRAM(S): 10 INJECTION INTRAVENOUS at 05:42

## 2022-01-11 RX ADMIN — Medication 75 MILLIGRAM(S): at 21:03

## 2022-01-11 RX ADMIN — Medication 1 APPLICATION(S): at 17:35

## 2022-01-11 RX ADMIN — ENOXAPARIN SODIUM 40 MILLIGRAM(S): 100 INJECTION SUBCUTANEOUS at 11:18

## 2022-01-11 RX ADMIN — Medication 15 MILLIGRAM(S): at 11:16

## 2022-01-11 RX ADMIN — Medication 25 MILLIGRAM(S): at 17:37

## 2022-01-11 RX ADMIN — ESCITALOPRAM OXALATE 10 MILLIGRAM(S): 10 TABLET, FILM COATED ORAL at 11:17

## 2022-01-11 RX ADMIN — Medication 10 UNIT(S): at 12:12

## 2022-01-11 RX ADMIN — INSULIN GLARGINE 40 UNIT(S): 100 INJECTION, SOLUTION SUBCUTANEOUS at 08:49

## 2022-01-11 RX ADMIN — Medication 2: at 08:43

## 2022-01-11 RX ADMIN — CYCLOBENZAPRINE HYDROCHLORIDE 10 MILLIGRAM(S): 10 TABLET, FILM COATED ORAL at 21:11

## 2022-01-11 RX ADMIN — Medication 15 MILLIGRAM(S): at 05:41

## 2022-01-11 RX ADMIN — CHLORHEXIDINE GLUCONATE 1 APPLICATION(S): 213 SOLUTION TOPICAL at 11:22

## 2022-01-11 RX ADMIN — Medication 81 MILLIGRAM(S): at 11:17

## 2022-01-11 RX ADMIN — Medication 10 UNIT(S): at 08:44

## 2022-01-11 RX ADMIN — ONDANSETRON 4 MILLIGRAM(S): 8 TABLET, FILM COATED ORAL at 09:55

## 2022-01-11 NOTE — PROGRESS NOTE ADULT - SUBJECTIVE AND OBJECTIVE BOX
CRISTI MONTGOMERY  36y Female    INTERVAL HPI/OVERNIGHT EVENTS:    still with chronic abdominal cramps  occasional vomiting (small amount)  ate breakfast  no fever, diarrhea  had bM yesterday  still lying flat in bed - continuously educating pt on need to sit up after eating to reduce reflux  pt was supposed to get teaching on new insulin pump at bedside but she postponed it to this afternoon or tomorrow due to vomiting  suspect pt is trying to delay her discharge  anticipate discharge 1/12    T(F): 97.2 (01-11-22 @ 08:00), Max: 97.2 (01-11-22 @ 08:00)  HR: 111 (01-11-22 @ 08:00) (81 - 111)  BP: 130/77 (01-11-22 @ 08:00) (109/58 - 130/77)  RR: 18 (01-11-22 @ 08:00) (18 - 19)  SpO2: 99% (01-11-22 @ 08:00) (98% - 99%) on RA    I&O's Summary    CAPILLARY BLOOD GLUCOSE  112 (11 Jan 2022 11:00)      POCT Blood Glucose.: 235 mg/dL (11 Jan 2022 08:41)  POCT Blood Glucose.: 140 mg/dL (11 Jan 2022 00:12)  POCT Blood Glucose.: 70 mg/dL (10 Brayden 2022 21:16)  POCT Blood Glucose.: 118 mg/dL (10 Brayden 2022 16:47)        PHYSICAL EXAM:  GENERAL: NAD  HEAD:  Normocephalic  EYES:  conjunctiva and sclera clear  ENMT: Moist mucous membranes  NERVOUS SYSTEM:  Alert, awake, Good concentration  CHEST/LUNG: CTA b/l  HEART: Regular rate and rhythm  ABDOMEN: Soft, mildly tender in lower quadrants, no guarding, less distended;   Bowel sounds present  EXTREMITIES:   No edema    Consultant(s) Notes Reviewed:  [x ] YES  [ ] NO  Care Discussed with Consultants/Other Providers [ x] YES  [ ] NO    MEDICATIONS  (STANDING):  amitriptyline Oral Tab/Cap - Peds 75 milliGRAM(s) Oral at bedtime  ammonium lactate 12% Lotion 1 Application(s) Topical every 12 hours  aspirin  chewable 81 milliGRAM(s) Oral daily  chlorhexidine 4% Liquid 1 Application(s) Topical daily  enoxaparin Injectable 40 milliGRAM(s) SubCutaneous daily  escitalopram 10 milliGRAM(s) Oral daily  famotidine    Tablet 40 milliGRAM(s) Oral two times a day  insulin glargine Injectable (LANTUS) 40 Unit(s) SubCutaneous every morning  insulin glargine Injectable (LANTUS) 12 Unit(s) SubCutaneous at bedtime  insulin lispro (ADMELOG) corrective regimen sliding scale   SubCutaneous three times a day before meals  insulin lispro Injectable (ADMELOG) 10 Unit(s) SubCutaneous three times a day before meals  metoprolol tartrate 25 milliGRAM(s) Oral two times a day    MEDICATIONS  (PRN):  acetaminophen     Tablet .. 650 milliGRAM(s) Oral every 6 hours PRN Moderate Pain (4 - 6)  aluminum hydroxide/magnesium hydroxide/simethicone Suspension 30 milliLiter(s) Oral every 4 hours PRN Dyspepsia  benzonatate 100 milliGRAM(s) Oral three times a day PRN Cough  bisacodyl Suppository 10 milliGRAM(s) Rectal daily PRN Constipation  bismuth subsalicylate Liquid 10 milliLiter(s) Oral every 8 hours PRN Diarrhea  cyclobenzaprine 10 milliGRAM(s) Oral three times a day PRN Muscle Spasm  guaifenesin/dextromethorphan Oral Liquid 10 milliLiter(s) Oral every 4 hours PRN Cough  ketorolac   Injectable 15 milliGRAM(s) IV Push every 6 hours PRN Moderate Pain (4 - 6)  ondansetron Injectable 4 milliGRAM(s) IV Push every 6 hours PRN Nausea and/or Vomiting      LABS:                        10.9   5.31  )-----------( 431      ( 11 Jan 2022 06:20 )             34.6     01-11    138  |  100  |  12  ----------------------------<  189<H>  4.8   |  23  |  0.6<L>    Ca    9.3      11 Jan 2022 06:20  Mg     1.9     01-11    TPro  7.0  /  Alb  4.0  /  TBili  0.3  /  DBili  x   /  AST  34  /  ALT  90<H>  /  AlkPhos  115  01-10        Culture - Stool (collected 09 Jan 2022 12:20)  Source: .Stool Feces  Preliminary Report (10 Brayden 2022 18:03):    No enteric pathogens to date: Final culture pending        COVID-19 PCR: Detected (04 Jan 2022 15:21)  COVID-19 PCR: Detected (31 Dec 2021 21:46)  COVID-19 PCR: NotDetec (28 Dec 2021 09:46)  COVID-19 PCR: NotDetec (23 Dec 2021 09:56)  COVID-19 PCR: NotDetec (29 Nov 2021 10:00)  COVID-19 PCR: NotDetec (19 Nov 2021 10:00)  SARS-CoV-2: NotDetec (13 Nov 2021 13:25)  COVID-19 PCR: NotDetec (03 Oct 2021 14:45)    Ferritin, Serum: 9 ng/mL (12-23-21 @ 16:52)                    Case discussed with residents and RN on rounds today    Care discussed with pt

## 2022-01-11 NOTE — PROVIDER CONTACT NOTE (OTHER) - SITUATION
Pts P: 108
patient blood sugar was 61  patient asymptomatic . Patient has an insulin pump in place MD stated to recheck sugar patient given juice
pt P: 105
pts temp 102.7 and developed a cough.

## 2022-01-11 NOTE — PROVIDER CONTACT NOTE (OTHER) - REASON
Patient is scheduled for surgery with Dr. Justice      left message with: Omid    Date of Surgery: 6/30/2020    Location: [facility]    Informed patient they will need an adult  yes    Pre-op with surgeon (if applicable): n/a    H&P: Patient to schedule with pcp    Additional imaging/appointments: n/a    Surgery packet: mailed via [employer] 6/19/2020     Additional comments: n/a    
abd pain
low blood sugar
abd pain

## 2022-01-11 NOTE — PROGRESS NOTE ADULT - ASSESSMENT
35 y/o F with PMHx Type I DM on an insulin pump, complicated by Diabetic neuropathy and vulvodynia, HTN, chronic back pain, migraine headaches,  presents with lower quadrant abdominal pain, nausea and generalized weakness for 3 days in the setting of elevated sugars, elevated beta-hydroxybuturate, mild GAP, all concerning for DKA, now resolved, was waiting for d/c upon arrival of new insulin pump but became COVID + while waiting. Pt initially required oxygen support w/ NC, but currently on room air, asymptomatic. Pt is awaiting delivery of insulin pump.    # COVID-19 PNA  - PCR positive 12/31 and 1/4  - 1 episode of high grade fever  - was started on dexamethasone but ID said not necessary  - s/p remdesivir 200mg x1 - now stopped  - on RA and pulse ox is acceptable  - possibly worsening abdominal pain and diarrhea due to COVID and laxatives  - continue isolation per protocol  - c/w Robitussin 10mL Q4 PRN Cough  - c/w Benzonatate 100mg Oral three times a day PRN Cough      # Abdominal Pain  # Uptrending LFTs, resolved  -CT Abdomen and Pelvis w/ IV Cont 12/2/21:  No acute intra-abdominal or pelvic pathology is identified  - CLD Work up: completed, all labs collected   - LFTs uptrending, stopped RDV, steroids as per ID  - blood cultures 12/31 NGTD  - RUQ US no significant findings  - stopped miralax ,senna due to complaints of multiple episodes of diarrhea  - pepto-bismol for cramping, diarrhea  - monitor LFTs  - follow up outpatient with GI For MRI of liver lesions and colonoscopy to r/o IBD    # Diabetic Ketoacidosis, resolved  - patient treated in the ICU for DKA, now resolved  - episode of hypoglycemia, tx w/ dextrose, lispro decreased  - insulin lantus 40 units AM, 13 units bedtime, lispro 10 units before meals, SS  - followed by Endocrinology, previously on insulin pump however admitted for DKA likely because of pump malfunctioning, patient unable to take insulin by pen because of policy where she lives (can not take injection and there is not nurse on site 24 hours  - Pending delivery of new pump sent to [employer] and will be delivered to the hospital and to have virtual training to be trained on new pump    # Anxiety Disorder   - patient with hx of anxiety- started on lexapro 5 mg per psychiatry   - requests xanax occasionally    # Sinus Tachycardia, resolved  - c/w metoprolol     # Abdominal Pain   # Uptrending LFTs  -CT Abdomen and Pelvis w/ IV Cont 12/2/21:  No acute intra-abdominal or pelvic pathology is identified  - CLD Work up: completed, all labs collected   - RUQ US: no significant findings  - follow up outpatient with GI For MRI of liver lesions and colonoscopy to r/o ibd     Misc:   # DVT ppx: lovenox 40mg subQ daily  # GI ppx: N/A  # Diet: DASH/TLC & CC  # Activity: AAT  # CODE: Full Code  # Dispo: discharge planning, awaiting instructions on usage of insulin pump

## 2022-01-11 NOTE — CHART NOTE - NSCHARTNOTEFT_GEN_A_CORE
I attended COVID rounds with the Hospitalist and housestaff and called family.    [  ] I attempted to reach                            but was unable to leave a message  [x ] I left a message with patients mother Argelia [phone]  [  ] I reached                              and gave an update on the patient's condition     Left message that the patient's insulin pump arrived and the patient will be taught today how to use it; the patient will be discharged home tomorrow 1/12/22.

## 2022-01-11 NOTE — PROGRESS NOTE ADULT - SUBJECTIVE AND OBJECTIVE BOX
----------Daily Progress Note----------    HISTORY OF PRESENT ILLNESS:  Patient is a 36y old Female who presents with a chief complaint of Mild DKA (10 Brayden 2022 14:57)    Currently admitted to medicine with the primary diagnosis of DKA (diabetic ketoacidosis)       Today is hospital day 19d.     INTERVAL HOSPITAL COURSE / OVERNIGHT EVENTS:    Patient was examined and seen at bedside. This morning patient complaining of intermittent lower abdominal pain, 6/10 associated with nausea. Patient reports this a similar pain she had on admission, requesting for pain medications. No other complaints, patient has insulin pump delivered.    Review of Systems: Otherwise unremarkable     <<<<<PAST MEDICAL & SURGICAL HISTORY>>>>>  Neuropathy  right lower extremity, vaginal    Type 1 diabetes    Degenerative disc disease, thoracic    Urinary tract infection, recurrent    Gastroesophageal reflux disease, esophagitis presence not specified    Intractable headache, unspecified chronicity pattern, unspecified headache type    Major depressive disorder with single episode, in full remission  previously treated with zyprexa, celexa and lexapro    Tremor of right hand    Tachycardia    Spinal stenosis    No significant past surgical history      ALLERGIES  amoxicillin (Unknown)  Ceclor (Rash)  ciprofloxacin (Unknown)  clindamycin (Unknown)  flu vaccines (Other)  gabapentin (Unknown)  Influenza Virus Vaccine, H5N1, Inactivated (Unknown)  penicillins (Unknown)      Home Medications:  amitriptyline 75 mg oral tablet: 1 tab(s) orally once a day (at bedtime) (04 Nov 2021 15:06)  ammonium lactate 12% topical cream: Apply topically to affected area 2 times a day (23 Dec 2021 13:53)  aspirin 81 mg oral tablet, chewable: 1 tab(s) orally once a day (28 Dec 2021 09:29)  cyclobenzaprine 10 mg oral tablet: 1 tab(s) orally once a day (at bedtime) (28 Dec 2021 08:33)  guaifenesin-dextromethorphan 100 mg-10 mg/5 mL oral liquid: 10 milliliter(s) orally every 4 hours, As needed, Cough (11 Jan 2022 09:23)  metoprolol tartrate 25 mg oral tablet: 1 tab(s) orally 2 times a day (04 Nov 2021 15:06)  multivitamin: 1 tab(s) orally once a day (04 Nov 2021 15:06)        MEDICATIONS  STANDING MEDICATIONS  amitriptyline Oral Tab/Cap - Peds 75 milliGRAM(s) Oral at bedtime  ammonium lactate 12% Lotion 1 Application(s) Topical every 12 hours  aspirin  chewable 81 milliGRAM(s) Oral daily  chlorhexidine 4% Liquid 1 Application(s) Topical daily  enoxaparin Injectable 40 milliGRAM(s) SubCutaneous daily  escitalopram 10 milliGRAM(s) Oral daily  famotidine    Tablet 40 milliGRAM(s) Oral two times a day  insulin glargine Injectable (LANTUS) 40 Unit(s) SubCutaneous every morning  insulin glargine Injectable (LANTUS) 12 Unit(s) SubCutaneous at bedtime  insulin lispro (ADMELOG) corrective regimen sliding scale   SubCutaneous three times a day before meals  insulin lispro Injectable (ADMELOG) 10 Unit(s) SubCutaneous three times a day before meals  metoprolol tartrate 25 milliGRAM(s) Oral two times a day    PRN MEDICATIONS  acetaminophen     Tablet .. 650 milliGRAM(s) Oral every 6 hours PRN  aluminum hydroxide/magnesium hydroxide/simethicone Suspension 30 milliLiter(s) Oral every 4 hours PRN  benzonatate 100 milliGRAM(s) Oral three times a day PRN  bisacodyl Suppository 10 milliGRAM(s) Rectal daily PRN  bismuth subsalicylate Liquid 10 milliLiter(s) Oral every 8 hours PRN  cyclobenzaprine 10 milliGRAM(s) Oral three times a day PRN  guaifenesin/dextromethorphan Oral Liquid 10 milliLiter(s) Oral every 4 hours PRN  ketorolac   Injectable 15 milliGRAM(s) IV Push every 6 hours PRN  ondansetron Injectable 4 milliGRAM(s) IV Push every 6 hours PRN    VITALS:  T(F): 97.2  HR: 111  BP: 130/77  RR: 18  SpO2: 99%    <<<<<LABS>>>>>                        10.9   5.31  )-----------( 431      ( 11 Jan 2022 06:20 )             34.6     01-11    138  |  100  |  12  ----------------------------<  189<H>  4.8   |  23  |  0.6<L>    Ca    9.3      11 Jan 2022 06:20  Mg     1.9     01-11    TPro  7.0  /  Alb  4.0  /  TBili  0.3  /  DBili  x   /  AST  34  /  ALT  90<H>  /  AlkPhos  115  01-10              Culture - Stool (collected 09 Jan 2022 12:20)  Source: .Stool Feces  Preliminary Report (10 Brayden 2022 18:03):    No enteric pathogens to date: Final culture pending    [id number]        <<<<<RADIOLOGY>>>>>    <<<<<PHYSICAL EXAM>>>>>  GENERAL: Well developed, well nourished, curled in bed   HEENT: Normocephalic, atraumatic  Neck: Supple, non-tender, no lymphadenopathy.  PULMONARY: Clear to auscultation bilaterally. No rales, rhonchi, or wheezing.  CARDIOVASCULAR: Regular rate and rhythm, S1-S2, no murmurs  GASTROINTESTINAL: Soft, +tenderness in lower quadrants   RENAL: No CVA tenderness.  SKIN/EXTREMITIES: No clubbing or edema  NEUROLOGIC/MUSCULOSKELETAL: AOx4, grossly moving all extremities, no focal deficits.      -----------------------------------------------------------------------------------------------------------------------------------------------------------------------------------------------

## 2022-01-11 NOTE — PROGRESS NOTE ADULT - ASSESSMENT
37 y/o woman with PMH of Type I DM on insulin pump, complicated by Diabetic neuropathy and vulvodynia, HTN, chronic back pain and migraine headaches presents with lower quadrant abdominal pain, nausea and generalized weakness for 3 days in the setting of elevated sugars, elevated beta-hydroxybuturate, mild HAGMA, all concerning for DKA which is now resolved. She was waiting for new insulin pump and then became COVID + during this time.     1. COVID 19  - PCR positive 12/31 and 1/4  - 1 episode of high grade fever  - was started on dexamethasone but ID said not necessary  - s/p remdesivir 200mg x1 - now stopped  - on RA and pulse ox is acceptable  - possibly increased abdominal pain and diarrhea due to COVID and laxatives  - continue isolation per protocol  - repeat PCR ordered    2. Intermittent episodes of abdominal Pain, diarrhea (sometimes with some blood) and constipation  - CT Abdomen and Pelvis w/ IV Cont 12/2/21:  No acute intra-abdominal or pelvic pathology is identified  - seen by GI earlier this admission  - on miralax and senna - now stopped  - follows with Dr. Mejia - recalled GI on 1/6 who recommended to stop laxatives and for outpt f/u - pt with chronic symptoms  - tolerating current diet per staff  - check pending stool studies - stool culture so far negative growth  - outpt f/u for EGD and colonoscopy per GI - need to rule out IBD  - PPI and maalox for heartburn - pt instructed on antireflux measures    3. Transaminitis  - now resolving  - remdesivir stopped  - s/p CLD Work up   - RUQ US normal  - s/p recent MRI of abdomen 12/16     4. DKA now resolved  - on lantus 40 units AM, 12 units bedtime  - decreased lispro to 10 units before meals  - Endocrine f/u appreciated  - insulin pump arrived on 1/10 - pt needs to do virtual teaching today     5. Anxiety Disorder   - started on lexapro 5 mg per psychiatry and increased to 10mg daily    6. Sinus Tachycardia  - improved  - c/w metoprolol     7. DVT ppx: lovenox 40mg subQ daily      PROGRESS NOTE HANDOFF    Pending: insulin pump virtual teaching    pt aware of the plan of care  communication team will update mother on the plan of care    Disposition: [facility] - anticipate discharge 1/12

## 2022-01-11 NOTE — CHART NOTE - NSCHARTNOTEFT_GEN_A_CORE
Registered Dietitian Follow-Up     Patient Profile Reviewed                           Yes [x]   No []     Nutrition History Previously Obtained        Yes [x]  No []       Pertinent Subjective Information: pt hysterically crying upon RD assessment. Discussed w/ RN -- aware, pt likely crying d/t severe pain. However, says pt did eat this morning, had some spit-up this morning, not exactly vomiting. EMR po doc: 1/4-1/10: 0-100%. No chewing/swallowing difficulty reported.      Pertinent Medical Interventions:  # COVID-19 PNA  # Abdominal Pain  # Uptrending LFTs, resolved- follow up outpatient with GI For MRI of liver lesions and colonoscopy to r/o IBD  # Diabetic Ketoacidosis, resolved  - patient treated in the ICU for DKA, now resolved     Diet order: Diet, Consistent Carbohydrate w/Evening Snack:   Fiber/Residue Restricted  Lactose Restricted (Milk Sugar Intoler.)  Banatrol TF     Qty per Day:  2 (01-05-22 @ 13:37) [Active]    Anthropometrics:  Height (cm): 162.6 (01-05-22 @ 11:23)  wt: 78kg vs. 12/24: 74.8, 12/26: 81.1kg -- wt fluctuations d/t?? will need to monitor wts   BMI: 29.5 using 78kg   IBW: 54.4kg     MEDICATIONS  (STANDING):  amitriptyline Oral Tab/Cap - Peds 75 milliGRAM(s) Oral at bedtime  aspirin  chewable 81 milliGRAM(s) Oral daily  enoxaparin Injectable 40 milliGRAM(s) SubCutaneous daily  escitalopram 10 milliGRAM(s) Oral daily  famotidine    Tablet 40 milliGRAM(s) Oral two times a day  insulin glargine Injectable (LANTUS) 40 Unit(s) SubCutaneous every morning  insulin glargine Injectable (LANTUS) 12 Unit(s) SubCutaneous at bedtime  insulin lispro (ADMELOG) corrective regimen sliding scale   SubCutaneous three times a day before meals  insulin lispro Injectable (ADMELOG) 10 Unit(s) SubCutaneous three times a day before meals  metoprolol tartrate 25 milliGRAM(s) Oral two times a day    MEDICATIONS  (PRN):  aluminum hydroxide/magnesium hydroxide/simethicone Suspension 30 milliLiter(s) Oral every 4 hours PRN Dyspepsia  benzonatate 100 milliGRAM(s) Oral three times a day PRN Cough  bisacodyl Suppository 10 milliGRAM(s) Rectal daily PRN Constipation  bismuth subsalicylate Liquid 10 milliLiter(s) Oral every 8 hours PRN Diarrhea  cyclobenzaprine 10 milliGRAM(s) Oral three times a day PRN Muscle Spasm  ondansetron Injectable 4 milliGRAM(s) IV Push every 6 hours PRN Nausea and/or Vomiting    Pertinent Labs: 01-11 @ 06:20: Na 138, BUN 12, Cr 0.6<L>, <H>, K+ 4.8, Phos --, Mg 1.9, Alk Phos --, ALT/SGPT --, AST/SGOT --, HbA1c --    Finger Sticks:  POCT Blood Glucose.: 235 mg/dL (01-11 @ 08:41)  POCT Blood Glucose.: 140 mg/dL (01-11 @ 00:12)  POCT Blood Glucose.: 70 mg/dL (01-10 @ 21:16)  POCT Blood Glucose.: 118 mg/dL (01-10 @ 16:47)    Physical Findings:  - Appearance: AAOX4; no edema noted   - GI function: abdominal discomfort, LBM -- RN unsure LBM 1/7?? unable to clarify w/ pt about diarrhaea   - Tubes: n/a   - Oral/Mouth cavity: no chewing/swallowing difficult reported   - Skin: WDL      Nutrition Requirements: per initial RD assessment 1/5  Weight Used: 78kg      Estimated Energy Needs    Continue [x]  Adjust []  MSJ*1.1= 1603kcal/day      Estimated Protein Needs    Continue []  Adjust []  78-94g/day (1-1.2g/kg -- covid vs. near obese BMI considered)      Estimated Fluid Needs        Continue [x]  Adjust []  2340-2730mL/day (30-35mL/day)     Nutrient Intake: 0-100% per EMR      [] Previous Nutrition Diagnosis: Increased Nutrient Needs...             [x] Ongoing          [] Resolved     Nutrition Intervention: meals and snacks     Goal/Expected Outcome: Pt to consume and tolerate >75% of meals/snacks and PO supplements in 4 days.      Nutrition Monitoring:diet order,energy intake,body composition,NFPF, glucose profile     Recommendation:  1. cont w/ current diet order   2. Will monitor PO closely and attempt to speak w/ pt at f/u   3. Obtain daily wts     RD remains available: Danika Patel, RDN [phone]

## 2022-01-12 ENCOUNTER — TRANSCRIPTION ENCOUNTER (OUTPATIENT)
Age: 37
End: 2022-01-12

## 2022-01-12 VITALS
DIASTOLIC BLOOD PRESSURE: 68 MMHG | HEART RATE: 111 BPM | RESPIRATION RATE: 19 BRPM | OXYGEN SATURATION: 99 % | SYSTOLIC BLOOD PRESSURE: 135 MMHG

## 2022-01-12 LAB
GLUCOSE BLDC GLUCOMTR-MCNC: 106 MG/DL — HIGH (ref 70–99)
GLUCOSE BLDC GLUCOMTR-MCNC: 173 MG/DL — HIGH (ref 70–99)
GLUCOSE BLDC GLUCOMTR-MCNC: 255 MG/DL — HIGH (ref 70–99)
GLUCOSE BLDC GLUCOMTR-MCNC: 91 MG/DL — SIGNIFICANT CHANGE UP (ref 70–99)

## 2022-01-12 PROCEDURE — 99232 SBSQ HOSP IP/OBS MODERATE 35: CPT

## 2022-01-12 PROCEDURE — 93010 ELECTROCARDIOGRAM REPORT: CPT

## 2022-01-12 RX ORDER — INSULIN LISPRO 100/ML
80 VIAL (ML) SUBCUTANEOUS
Qty: 100 | Refills: 0
Start: 2022-01-12

## 2022-01-12 RX ORDER — CYCLOBENZAPRINE HYDROCHLORIDE 10 MG/1
1 TABLET, FILM COATED ORAL
Qty: 30 | Refills: 0
Start: 2022-01-12 | End: 2022-02-10

## 2022-01-12 RX ADMIN — Medication 75 MILLIGRAM(S): at 20:59

## 2022-01-12 RX ADMIN — ENOXAPARIN SODIUM 40 MILLIGRAM(S): 100 INJECTION SUBCUTANEOUS at 11:24

## 2022-01-12 RX ADMIN — FAMOTIDINE 40 MILLIGRAM(S): 10 INJECTION INTRAVENOUS at 17:19

## 2022-01-12 RX ADMIN — Medication 650 MILLIGRAM(S): at 20:59

## 2022-01-12 RX ADMIN — Medication 200 MILLIGRAM(S): at 08:41

## 2022-01-12 RX ADMIN — Medication 30 MILLILITER(S): at 09:16

## 2022-01-12 RX ADMIN — CYCLOBENZAPRINE HYDROCHLORIDE 10 MILLIGRAM(S): 10 TABLET, FILM COATED ORAL at 21:11

## 2022-01-12 RX ADMIN — Medication 200 MILLIGRAM(S): at 02:57

## 2022-01-12 RX ADMIN — Medication 30 MILLILITER(S): at 20:59

## 2022-01-12 RX ADMIN — Medication 81 MILLIGRAM(S): at 11:24

## 2022-01-12 RX ADMIN — Medication 25 MILLIGRAM(S): at 05:33

## 2022-01-12 RX ADMIN — Medication 200 MILLIGRAM(S): at 09:12

## 2022-01-12 RX ADMIN — Medication 25 MILLIGRAM(S): at 17:19

## 2022-01-12 RX ADMIN — ONDANSETRON 4 MILLIGRAM(S): 8 TABLET, FILM COATED ORAL at 08:47

## 2022-01-12 RX ADMIN — Medication 1 APPLICATION(S): at 05:32

## 2022-01-12 RX ADMIN — CHLORHEXIDINE GLUCONATE 1 APPLICATION(S): 213 SOLUTION TOPICAL at 11:23

## 2022-01-12 RX ADMIN — Medication 1 APPLICATION(S): at 17:18

## 2022-01-12 RX ADMIN — ESCITALOPRAM OXALATE 10 MILLIGRAM(S): 10 TABLET, FILM COATED ORAL at 11:24

## 2022-01-12 RX ADMIN — Medication 200 MILLIGRAM(S): at 02:35

## 2022-01-12 RX ADMIN — FAMOTIDINE 40 MILLIGRAM(S): 10 INJECTION INTRAVENOUS at 05:33

## 2022-01-12 NOTE — PROGRESS NOTE ADULT - REASON FOR ADMISSION
Mild DKA
Diabetic ketoacidosis
Mild DKA

## 2022-01-12 NOTE — PROGRESS NOTE ADULT - SUBJECTIVE AND OBJECTIVE BOX
CRISTI MONTGOMERY  36y  [facility] [id number]      Patient is a 36y old  Female who presents with a chief complaint of Mild DKA (11 Jan 2022 12:19)      INTERVAL HPI/OVERNIGHT EVENTS:  no acute events overnight       REVIEW OF SYSTEMS:  CONSTITUTIONAL: No fever, weight loss, or fatigue  EYES: No eye pain, visual disturbances, or discharge  ENMT:  No difficulty hearing, tinnitus, vertigo; No sinus or throat pain  NECK: No pain or stiffness  BREASTS: No pain, masses, or nipple discharge  RESPIRATORY: No cough, wheezing, chills or hemoptysis; No shortness of breath  CARDIOVASCULAR: No chest pain, palpitations, dizziness, or leg swelling  GASTROINTESTINAL: Epigastric pain radiating retrosternal post breakfast  GENITOURINARY: No dysuria, frequency, hematuria, or incontinence  NEUROLOGICAL: No headaches, memory loss, loss of strength, numbness, or tremors  SKIN: No itching, burning, rashes, or lesions   LYMPH NODES: No enlarged glands  ENDOCRINE: No heat or cold intolerance; No hair loss  MUSCULOSKELETAL: No joint pain or swelling; No muscle, back, or extremity pain  PSYCHIATRIC: No depression, anxiety, mood swings, or difficulty sleeping  HEME/LYMPH: No easy bruising, or bleeding gums  ALLERY AND IMMUNOLOGIC: No hives or eczema  FAMILY HISTORY:    T(C): 36.2 (01-12-22 @ 08:00), Max: 37.1 (01-12-22 @ 00:00)  HR: 111 (01-12-22 @ 08:54) (97 - 129)  BP: 135/68 (01-12-22 @ 08:54) (117/71 - 137/81)  RR: 19 (01-12-22 @ 08:54) (18 - 19)  SpO2: 99% (01-12-22 @ 08:54) (99% - 99%)  Wt(kg): --Vital Signs Last 24 Hrs  T(C): 36.2 (12 Jan 2022 08:00), Max: 37.1 (12 Jan 2022 00:00)  T(F): 97.2 (12 Jan 2022 08:00), Max: 98.8 (12 Jan 2022 00:00)  HR: 111 (12 Jan 2022 08:54) (97 - 129)  BP: 135/68 (12 Jan 2022 08:54) (117/71 - 137/81)  BP(mean): --  RR: 19 (12 Jan 2022 08:54) (18 - 19)  SpO2: 99% (12 Jan 2022 08:54) (99% - 99%)    PHYSICAL EXAM:  GENERAL: NAD, well-groomed, well-developed  HEAD:  Atraumatic, Normocephalic  EYES: EOMI, PERRLA, conjunctiva and sclera clear  ENMT: No tonsillar erythema, exudates, or enlargement; Moist mucous membranes, Good dentition, No lesions  NECK: Supple, No JVD, Normal thyroid  NERVOUS SYSTEM:  Alert & Oriented X3, Good concentration; Motor Strength 5/5 B/L upper and lower extremities; DTRs 2+ intact and symmetric  PULM: Clear to auscultation bilaterally  CARDIAC: Regular rate and rhythm; No murmurs, rubs, or gallops  GI: Soft, Nontender, Nondistended; Bowel sounds present  EXTREMITIES:  2+ Peripheral Pulses, No clubbing, cyanosis, or edema  LYMPH: No lymphadenopathy noted  SKIN: No rashes or lesions    Consultant(s) Notes Reviewed:  [x ] YES  [ ] NO  Care Discussed with Consultants/Other Providers [ x] YES  [ ] NO    LABS:                            10.9   5.31  )-----------( 431      ( 11 Jan 2022 06:20 )             34.6   01-11    138  |  100  |  12  ----------------------------<  189<H>  4.8   |  23  |  0.6<L>    Ca    9.3      11 Jan 2022 06:20  Mg     1.9     01-11              Culture - Stool (collected 09 Jan 2022 12:20)  Source: .Stool Feces  Final Report (11 Jan 2022 18:33):    No enteric pathogens isolated.    (Stool culture examined for Salmonella,    Shigella, Campylobacter, Aeromonas, Plesiomonas,    Vibrio, E.coli O157 and Yersinia)      acetaminophen     Tablet .. 650 milliGRAM(s) Oral every 6 hours PRN  aluminum hydroxide/magnesium hydroxide/simethicone Suspension 30 milliLiter(s) Oral every 4 hours PRN  amitriptyline Oral Tab/Cap - Peds 75 milliGRAM(s) Oral at bedtime  ammonium lactate 12% Lotion 1 Application(s) Topical every 12 hours  aspirin  chewable 81 milliGRAM(s) Oral daily  benzonatate 100 milliGRAM(s) Oral three times a day PRN  bisacodyl Suppository 10 milliGRAM(s) Rectal daily PRN  bismuth subsalicylate Liquid 10 milliLiter(s) Oral every 8 hours PRN  chlorhexidine 4% Liquid 1 Application(s) Topical daily  cyclobenzaprine 10 milliGRAM(s) Oral three times a day PRN  enoxaparin Injectable 40 milliGRAM(s) SubCutaneous daily  escitalopram 10 milliGRAM(s) Oral daily  famotidine    Tablet 40 milliGRAM(s) Oral two times a day  guaifenesin/dextromethorphan Oral Liquid 10 milliLiter(s) Oral every 4 hours PRN  ibuprofen  Tablet. 200 milliGRAM(s) Oral every 6 hours PRN  metoprolol tartrate 25 milliGRAM(s) Oral two times a day  ondansetron    Tablet 4 milliGRAM(s) Oral every 6 hours PRN      HEALTH ISSUES - PROBLEM Dx:          Case Discussed with House Staff     Spectra [phone]

## 2022-01-12 NOTE — DISCHARGE NOTE NURSING/CASE MANAGEMENT/SOCIAL WORK - PATIENT PORTAL LINK FT
You can access the FollowMyHealth Patient Portal offered by [facility] by registering at the following website: [email or web address]. By joining [employer]’s FollowMyHealth portal, you will also be able to view your health information using other applications (apps) compatible with our system.

## 2022-01-12 NOTE — DISCHARGE NOTE NURSING/CASE MANAGEMENT/SOCIAL WORK - NSDCPEFALRISK_GEN_ALL_CORE
For information on Fall & Injury Prevention, visit: [email or web address] OR  [email or web address] OR  https://www.cdc.gov/steadi/patient.html

## 2022-01-12 NOTE — CHART NOTE - NSCHARTNOTESELECT_GEN_ALL_CORE
Event Note
Transfer Note
covid communication team/Event Note
covid communication team/Event Note
Event Note
Nutrition/Event Note

## 2022-01-12 NOTE — PROGRESS NOTE ADULT - ASSESSMENT
37 y/o woman with PMH of Type I DM on insulin pump, complicated by Diabetic neuropathy and vulvodynia, HTN, chronic back pain and migraine headaches presents with lower quadrant abdominal pain, nausea and generalized weakness for 3 days in the setting of elevated sugars, elevated beta-hydroxybuturate, mild HAGMA, all concerning for DKA which is now resolved. She was waiting for new insulin pump and then became COVID + during this time.     #. COVID 19  assymptomatic     #. Intermittent episodes of abdominal Pain, diarrhea (sometimes with some blood) and constipation  better controlled pain , no episodes of hematochezia     #HTN   BP: 135/68 (12 Jan 2022 08:54) (117/71 - 137/81)  controlled    #. Transaminitis    #. DKA now resolved in the setting of Diabetes  on insulin pump   CAPILLARY BLOOD GLUCOSE  112 (11 Jan 2022 11:00)      POCT Blood Glucose.: 173 mg/dL (12 Jan 2022 07:58)  POCT Blood Glucose.: 150 mg/dL (11 Jan 2022 22:42)  POCT Blood Glucose.: 64 mg/dL (11 Jan 2022 21:10)  POCT Blood Glucose.: 133 mg/dL (11 Jan 2022 17:28)  controlled    #. Anxiety Disorder     #. Sinus Tachycardia improved    PROGRESS NOTE HANDOFF    Pending: DC     pt aware of the plan of care  communication team will update mother on the plan of care    Disposition: [facility] -

## 2022-01-12 NOTE — PROGRESS NOTE ADULT - PROVIDER SPECIALTY LIST ADULT
Endocrinology
Endocrinology
Hospitalist
Internal Medicine
Critical Care
Endocrinology
Hospitalist
Internal Medicine
Endocrinology
Hospitalist
Hospitalist
Internal Medicine
Critical Care
Critical Care
Endocrinology
Hospitalist
Hospitalist
Internal Medicine
Infectious Disease

## 2022-01-13 ENCOUNTER — INPATIENT (INPATIENT)
Facility: HOSPITAL | Age: 37
LOS: 6 days | Discharge: HOME | End: 2022-01-20
Attending: STUDENT IN AN ORGANIZED HEALTH CARE EDUCATION/TRAINING PROGRAM | Admitting: STUDENT IN AN ORGANIZED HEALTH CARE EDUCATION/TRAINING PROGRAM
Payer: MEDICAID

## 2022-01-13 VITALS
DIASTOLIC BLOOD PRESSURE: 75 MMHG | SYSTOLIC BLOOD PRESSURE: 138 MMHG | OXYGEN SATURATION: 100 % | HEART RATE: 95 BPM | TEMPERATURE: 98 F | HEIGHT: 64 IN | RESPIRATION RATE: 18 BRPM

## 2022-01-13 DIAGNOSIS — Z79.4 LONG TERM (CURRENT) USE OF INSULIN: ICD-10-CM

## 2022-01-13 DIAGNOSIS — Z96.41 PRESENCE OF INSULIN PUMP (EXTERNAL) (INTERNAL): ICD-10-CM

## 2022-01-13 DIAGNOSIS — G43.909 MIGRAINE, UNSPECIFIED, NOT INTRACTABLE, WITHOUT STATUS MIGRAINOSUS: ICD-10-CM

## 2022-01-13 DIAGNOSIS — N94.819 VULVODYNIA, UNSPECIFIED: ICD-10-CM

## 2022-01-13 DIAGNOSIS — Z86.16 PERSONAL HISTORY OF COVID-19: ICD-10-CM

## 2022-01-13 DIAGNOSIS — E10.40 TYPE 1 DIABETES MELLITUS WITH DIABETIC NEUROPATHY, UNSPECIFIED: ICD-10-CM

## 2022-01-13 DIAGNOSIS — E86.0 DEHYDRATION: ICD-10-CM

## 2022-01-13 DIAGNOSIS — R56.9 UNSPECIFIED CONVULSIONS: ICD-10-CM

## 2022-01-13 DIAGNOSIS — R55 SYNCOPE AND COLLAPSE: ICD-10-CM

## 2022-01-13 DIAGNOSIS — I10 ESSENTIAL (PRIMARY) HYPERTENSION: ICD-10-CM

## 2022-01-13 DIAGNOSIS — F41.9 ANXIETY DISORDER, UNSPECIFIED: ICD-10-CM

## 2022-01-13 DIAGNOSIS — K58.9 IRRITABLE BOWEL SYNDROME WITHOUT DIARRHEA: ICD-10-CM

## 2022-01-13 LAB
ALBUMIN SERPL ELPH-MCNC: 4.5 G/DL — SIGNIFICANT CHANGE UP (ref 3.5–5.2)
ALP SERPL-CCNC: 122 U/L — HIGH (ref 30–115)
ALT FLD-CCNC: 83 U/L — HIGH (ref 0–41)
ANION GAP SERPL CALC-SCNC: 13 MMOL/L — SIGNIFICANT CHANGE UP (ref 7–14)
APPEARANCE UR: CLEAR — SIGNIFICANT CHANGE UP
AST SERPL-CCNC: 43 U/L — HIGH (ref 0–41)
BASOPHILS # BLD AUTO: 0.02 K/UL — SIGNIFICANT CHANGE UP (ref 0–0.2)
BASOPHILS NFR BLD AUTO: 0.4 % — SIGNIFICANT CHANGE UP (ref 0–1)
BILIRUB SERPL-MCNC: 0.3 MG/DL — SIGNIFICANT CHANGE UP (ref 0.2–1.2)
BILIRUB UR-MCNC: NEGATIVE — SIGNIFICANT CHANGE UP
BUN SERPL-MCNC: 7 MG/DL — LOW (ref 10–20)
CALCIUM SERPL-MCNC: 9.4 MG/DL — SIGNIFICANT CHANGE UP (ref 8.5–10.1)
CHLORIDE SERPL-SCNC: 98 MMOL/L — SIGNIFICANT CHANGE UP (ref 98–110)
CO2 SERPL-SCNC: 21 MMOL/L — SIGNIFICANT CHANGE UP (ref 17–32)
COLOR SPEC: YELLOW — SIGNIFICANT CHANGE UP
CREAT SERPL-MCNC: 0.6 MG/DL — LOW (ref 0.7–1.5)
DIFF PNL FLD: NEGATIVE — SIGNIFICANT CHANGE UP
EOSINOPHIL # BLD AUTO: 0.02 K/UL — SIGNIFICANT CHANGE UP (ref 0–0.7)
EOSINOPHIL NFR BLD AUTO: 0.4 % — SIGNIFICANT CHANGE UP (ref 0–8)
GLUCOSE BLDC GLUCOMTR-MCNC: 117 MG/DL — HIGH (ref 70–99)
GLUCOSE SERPL-MCNC: 262 MG/DL — HIGH (ref 70–99)
GLUCOSE UR QL: ABNORMAL
HCG SERPL QL: NEGATIVE — SIGNIFICANT CHANGE UP
HCT VFR BLD CALC: 34.4 % — LOW (ref 37–47)
HGB BLD-MCNC: 10.6 G/DL — LOW (ref 12–16)
IMM GRANULOCYTES NFR BLD AUTO: 0.4 % — HIGH (ref 0.1–0.3)
KETONES UR-MCNC: NEGATIVE — SIGNIFICANT CHANGE UP
LACTATE SERPL-SCNC: 1.9 MMOL/L — SIGNIFICANT CHANGE UP (ref 0.7–2)
LEUKOCYTE ESTERASE UR-ACNC: NEGATIVE — SIGNIFICANT CHANGE UP
LIDOCAIN IGE QN: 11 U/L — SIGNIFICANT CHANGE UP (ref 7–60)
LYMPHOCYTES # BLD AUTO: 1.47 K/UL — SIGNIFICANT CHANGE UP (ref 1.2–3.4)
LYMPHOCYTES # BLD AUTO: 32.4 % — SIGNIFICANT CHANGE UP (ref 20.5–51.1)
MCHC RBC-ENTMCNC: 25.8 PG — LOW (ref 27–31)
MCHC RBC-ENTMCNC: 30.8 G/DL — LOW (ref 32–37)
MCV RBC AUTO: 83.7 FL — SIGNIFICANT CHANGE UP (ref 81–99)
MONOCYTES # BLD AUTO: 0.42 K/UL — SIGNIFICANT CHANGE UP (ref 0.1–0.6)
MONOCYTES NFR BLD AUTO: 9.3 % — SIGNIFICANT CHANGE UP (ref 1.7–9.3)
NEUTROPHILS # BLD AUTO: 2.59 K/UL — SIGNIFICANT CHANGE UP (ref 1.4–6.5)
NEUTROPHILS NFR BLD AUTO: 57.1 % — SIGNIFICANT CHANGE UP (ref 42.2–75.2)
NITRITE UR-MCNC: NEGATIVE — SIGNIFICANT CHANGE UP
NRBC # BLD: 0 /100 WBCS — SIGNIFICANT CHANGE UP (ref 0–0)
NT-PROBNP SERPL-SCNC: <5 PG/ML — SIGNIFICANT CHANGE UP (ref 0–300)
PH UR: 7 — SIGNIFICANT CHANGE UP (ref 5–8)
PLATELET # BLD AUTO: 425 K/UL — HIGH (ref 130–400)
POTASSIUM SERPL-MCNC: 5.1 MMOL/L — HIGH (ref 3.5–5)
POTASSIUM SERPL-SCNC: 5.1 MMOL/L — HIGH (ref 3.5–5)
PROT SERPL-MCNC: 7.6 G/DL — SIGNIFICANT CHANGE UP (ref 6–8)
PROT UR-MCNC: SIGNIFICANT CHANGE UP
RBC # BLD: 4.11 M/UL — LOW (ref 4.2–5.4)
RBC # FLD: 14.3 % — SIGNIFICANT CHANGE UP (ref 11.5–14.5)
SARS-COV-2 RNA SPEC QL NAA+PROBE: SIGNIFICANT CHANGE UP
SODIUM SERPL-SCNC: 132 MMOL/L — LOW (ref 135–146)
SP GR SPEC: 1.02 — SIGNIFICANT CHANGE UP (ref 1.01–1.03)
TROPONIN T SERPL-MCNC: <0.01 NG/ML — SIGNIFICANT CHANGE UP
TROPONIN T SERPL-MCNC: <0.01 NG/ML — SIGNIFICANT CHANGE UP
UROBILINOGEN FLD QL: SIGNIFICANT CHANGE UP
WBC # BLD: 4.54 K/UL — LOW (ref 4.8–10.8)
WBC # FLD AUTO: 4.54 K/UL — LOW (ref 4.8–10.8)

## 2022-01-13 PROCEDURE — 71045 X-RAY EXAM CHEST 1 VIEW: CPT | Mod: 26

## 2022-01-13 PROCEDURE — 99223 1ST HOSP IP/OBS HIGH 75: CPT

## 2022-01-13 PROCEDURE — 71275 CT ANGIOGRAPHY CHEST: CPT | Mod: 26,MA

## 2022-01-13 PROCEDURE — 99218: CPT

## 2022-01-13 PROCEDURE — 99233 SBSQ HOSP IP/OBS HIGH 50: CPT

## 2022-01-13 PROCEDURE — 93010 ELECTROCARDIOGRAM REPORT: CPT

## 2022-01-13 RX ORDER — METOPROLOL TARTRATE 50 MG
25 TABLET ORAL
Refills: 0 | Status: DISCONTINUED | OUTPATIENT
Start: 2022-01-13 | End: 2022-01-13

## 2022-01-13 RX ORDER — SODIUM CHLORIDE 9 MG/ML
2000 INJECTION, SOLUTION INTRAVENOUS ONCE
Refills: 0 | Status: COMPLETED | OUTPATIENT
Start: 2022-01-13 | End: 2022-01-13

## 2022-01-13 RX ORDER — AMITRIPTYLINE HCL 25 MG
75 TABLET ORAL AT BEDTIME
Refills: 0 | Status: DISCONTINUED | OUTPATIENT
Start: 2022-01-13 | End: 2022-01-20

## 2022-01-13 RX ORDER — MAGNESIUM SULFATE 500 MG/ML
2 VIAL (ML) INJECTION ONCE
Refills: 0 | Status: COMPLETED | OUTPATIENT
Start: 2022-01-13 | End: 2022-01-13

## 2022-01-13 RX ORDER — IVABRADINE 7.5 MG/1
5 TABLET, FILM COATED ORAL
Refills: 0 | Status: DISCONTINUED | OUTPATIENT
Start: 2022-01-13 | End: 2022-01-19

## 2022-01-13 RX ORDER — FAMOTIDINE 10 MG/ML
40 INJECTION INTRAVENOUS
Refills: 0 | Status: DISCONTINUED | OUTPATIENT
Start: 2022-01-13 | End: 2022-01-20

## 2022-01-13 RX ORDER — SODIUM CHLORIDE 9 MG/ML
1000 INJECTION INTRAMUSCULAR; INTRAVENOUS; SUBCUTANEOUS
Refills: 0 | Status: DISCONTINUED | OUTPATIENT
Start: 2022-01-13 | End: 2022-01-15

## 2022-01-13 RX ORDER — SOD,AMMONIUM,POTASSIUM LACTATE
1 CREAM (GRAM) TOPICAL
Refills: 0 | Status: DISCONTINUED | OUTPATIENT
Start: 2022-01-13 | End: 2022-01-20

## 2022-01-13 RX ORDER — POLYETHYLENE GLYCOL 3350 17 G/17G
17 POWDER, FOR SOLUTION ORAL DAILY
Refills: 0 | Status: DISCONTINUED | OUTPATIENT
Start: 2022-01-13 | End: 2022-01-15

## 2022-01-13 RX ORDER — SENNA PLUS 8.6 MG/1
2 TABLET ORAL AT BEDTIME
Refills: 0 | Status: DISCONTINUED | OUTPATIENT
Start: 2022-01-13 | End: 2022-01-13

## 2022-01-13 RX ORDER — ASPIRIN/CALCIUM CARB/MAGNESIUM 324 MG
81 TABLET ORAL DAILY
Refills: 0 | Status: DISCONTINUED | OUTPATIENT
Start: 2022-01-13 | End: 2022-01-20

## 2022-01-13 RX ORDER — ESCITALOPRAM OXALATE 10 MG/1
5 TABLET, FILM COATED ORAL DAILY
Refills: 0 | Status: DISCONTINUED | OUTPATIENT
Start: 2022-01-13 | End: 2022-01-20

## 2022-01-13 RX ORDER — METOPROLOL TARTRATE 50 MG
25 TABLET ORAL EVERY 8 HOURS
Refills: 0 | Status: DISCONTINUED | OUTPATIENT
Start: 2022-01-13 | End: 2022-01-20

## 2022-01-13 RX ORDER — CHLORHEXIDINE GLUCONATE 213 G/1000ML
1 SOLUTION TOPICAL
Refills: 0 | Status: DISCONTINUED | OUTPATIENT
Start: 2022-01-13 | End: 2022-01-20

## 2022-01-13 RX ORDER — ENOXAPARIN SODIUM 100 MG/ML
40 INJECTION SUBCUTANEOUS DAILY
Refills: 0 | Status: DISCONTINUED | OUTPATIENT
Start: 2022-01-13 | End: 2022-01-20

## 2022-01-13 RX ORDER — CYCLOBENZAPRINE HYDROCHLORIDE 10 MG/1
10 TABLET, FILM COATED ORAL ONCE
Refills: 0 | Status: COMPLETED | OUTPATIENT
Start: 2022-01-13 | End: 2022-01-14

## 2022-01-13 RX ORDER — GUAIFENESIN/DEXTROMETHORPHAN 600MG-30MG
10 TABLET, EXTENDED RELEASE 12 HR ORAL EVERY 4 HOURS
Refills: 0 | Status: DISCONTINUED | OUTPATIENT
Start: 2022-01-13 | End: 2022-01-20

## 2022-01-13 RX ADMIN — SODIUM CHLORIDE 2000 MILLILITER(S): 9 INJECTION, SOLUTION INTRAVENOUS at 16:03

## 2022-01-13 RX ADMIN — Medication 75 MILLIGRAM(S): at 22:28

## 2022-01-13 RX ADMIN — Medication 2 GRAM(S): at 16:03

## 2022-01-13 RX ADMIN — Medication 25 GRAM(S): at 12:37

## 2022-01-13 RX ADMIN — SODIUM CHLORIDE 75 MILLILITER(S): 9 INJECTION INTRAMUSCULAR; INTRAVENOUS; SUBCUTANEOUS at 22:29

## 2022-01-13 RX ADMIN — Medication 25 MILLIGRAM(S): at 22:28

## 2022-01-13 NOTE — ED ADULT NURSE NOTE - NSIMPLEMENTINTERV_GEN_ALL_ED
Implemented All Fall Risk Interventions:  [place] to call system. Call bell, personal items and telephone within reach. Instruct patient to call for assistance. Room bathroom lighting operational. Non-slip footwear when patient is off stretcher. Physically safe environment: no spills, clutter or unnecessary equipment. Stretcher in lowest position, wheels locked, appropriate side rails in place. Provide visual cue, wrist band, yellow gown, etc. Monitor gait and stability. Monitor for mental status changes and reorient to person, place, and time. Review medications for side effects contributing to fall risk. Reinforce activity limits and safety measures with patient and family.

## 2022-01-13 NOTE — H&P ADULT - ATTENDING COMMENTS
in progress 36 yr old female with hx of DMI on insulin pump, diabetic neuropathy, HTN, anxiety/depression, history of suicide attempts 3x during adolescence, pseudoseizures?, recent admission for DKA complicated by COVID-19 infection, discharged yesterday (lives at assisted living facility), coming back today because of 2 episodes of fainting. Patient says she was at the assisted living, waiting for the elevator, when she collapsed in her mom's hands. She said she was having continuous chest pain before the event, as well as shortness of breath and palpitations. She says she was dizzy, weak, and nauseous and shaking. The second time, she was alone, and also fainted.    # Syncope   - etiology unclear orthostatic vs arrhythmia vs neurovascular vs somatic disorder   - trop negx2  - ekg: NSR with no acute ST changes   - orthostatic negative   - CCTA  - EP following: inc metoprolol to 25mg q8; start ivabradine; mcot prior to discharge    - cardiology consult (Dr. Christian)  - Psychiatry evaluation    # Mild Transaminitis   - check hep panel   - trend LFT    #  Type I DM   # Diabetic neuropathy  - HbA1C (12/25/21): 8.6  - c/w insulin pump    # Anxiety Disorder   - c/w Lexapro and amitriptyline    # DVT ppx  - start Lovenox      # DASH diet, CHO consistent     # Ambualte as tolerated    # Full code 36 yr old female with hx of COVID 19, DM type I on insulin pump, diabetic neuropathy, HTN, anxiety/depression, history of suicide attempts 3x during adolescence, pseudoseizures?, recent admission for DKA complicated by COVID-19 infection, discharged yesterday (lives at assisted living facility), coming back today because of 2 episodes of fainting. Patient says she was at the assisted living, waiting for the elevator, when she felt lightheaded but never lost consciousness and fell in her mom's hands. She said she was having continuous chest pain before the event, as well as shortness of breath and palpitations. She says she was dizzy, weak, and nauseous and shaking. Later in the day, patient states she was getting ready to come to the hospital and synopsized in the bathroom unwitnessed.     # Syncope   - etiology unclear: orthostatic vs arrhythmia vs neurovascular vs somatic disorder vs Factitious disorder  - trop negx2  - ekg: NSR with no acute ST changes   - orthostatic negative   - CCTA, reeg  - EP following: inc metoprolol to 25mg q8; start ivabradine; mcot prior to discharge    - cardiology consult (Dr. Dhillon)  - Psychiatry evaluation  - F/u Neurology recommendations     # Mild Transaminitis   - check hep panel   - trend LFT    #  Type I DM   # Diabetic neuropathy  - HbA1C (12/25/21): 8.6  - c/w insulin pump    # Anxiety Disorder   - c/w Lexapro and amitriptyline    # DVT ppx  - start Lovenox      # DASH diet, CHO consistent     # Ambulate as tolerated    # Full code

## 2022-01-13 NOTE — CONSULT NOTE ADULT - SUBJECTIVE AND OBJECTIVE BOX
NEUROLOGY CONSULT    HPI:  36-year-old, F, patient, PMHx: Type I DM ( since the age of 16 months) on an insulin pump complicated by Diabetic neuropathy , HTN, chronic back pain, migraine headaches, recently admitted to the hospital fo DKA and COVID, discharged yesterday presents to the hospital due to syncope.   Patient reports syncopal episode twice today, which was witnessed by her mother. Pt was at the assisted living when she collapsed, and her mother caught her; second time patient was in bathroom getting dressed and felt lightheaded and weak, and passed out. She reported having continuous chest pain, SOB and palpitations before the event.   + dizzy, weakness, and nausea. Denies head injury, no seizure like activity, no urinary or bowel incontinence, no tongue biting.   Pt dad 3 similar syncopal episodes during her last admission, has also been having retrosternal chest pain for past 3 weeks during her past hospitalization.  Reports lower abdominal pain for past 3 weeks, not sexually active.   Denies fever, chills, headache, SOB, palpitations, urinary symptoms, leg pain, leg swelling, and pain elsewhere.    Labs significant for glucose 262. Hgb 10.6, WBC 4.5. Na 132. UA negative. Trop negative.   EKG QTc 492.  CXR negative for cardiopulmonary disease. CTA negative for Pulm embolism.  Pt was given 2g Mag and 2L of LR bolus in ED.      (2022 18:34)    Neurology assessment:   Ptn states she had 3 falls during the recent hospital stay, had Hx of RUE shakes and tongue bite during ICU stay, rEEG and 24h EEG resulted negative for any epileptiform activities, background 10HZ. Pt was at the assisted living when she collapsed, and her mother caught her; second time patient was in bathroom getting dressed and felt lightheaded and weak, and passed out.  she experienced nausea before the episodes and felt nauseous and tired afterwards, does not remember about the incidence, denies incontinence, tongue bite or any shake. She also complains of being more anxious recently. She had episode of hypoglycemia and received a new insuline pump 2 days ago, mentioned reading of finger stick glucose 55 today. Labs . SHe is currently back to baseline and no complains.      MEDICATIONS  Home Medications:  amitriptyline 75 mg oral tablet: 1 tab(s) orally once a day (at bedtime) (2021 15:06)  ammonium lactate 12% topical cream: Apply topically to affected area 2 times a day (23 Dec 2021 13:53)  aspirin 81 mg oral tablet, chewable: 1 tab(s) orally once a day (28 Dec 2021 09:29)  cyclobenzaprine 10 mg oral tablet: 1 tab(s) orally once a day (at bedtime) (28 Dec 2021 08:33)  famotidine 40 mg oral tablet: 1 tab(s) orally 2 times a day (2022 14:26)  metoprolol tartrate 25 mg oral tablet: 1 tab(s) orally 2 times a day (2021 15:06)  multivitamin: 1 tab(s) orally once a day (2021 15:06)    MEDICATIONS  (STANDING):  amitriptyline Oral Tab/Cap - Peds 75 milliGRAM(s) Oral at bedtime  ammonium lactate 12% Lotion 1 Application(s) Topical two times a day  aspirin  chewable 81 milliGRAM(s) Oral daily  chlorhexidine 4% Liquid 1 Application(s) Topical <User Schedule>  cyclobenzaprine Oral Tab/Cap - Peds 10 milliGRAM(s) Oral once  enoxaparin Injectable 40 milliGRAM(s) SubCutaneous daily  escitalopram 5 milliGRAM(s) Oral daily  famotidine    Tablet 40 milliGRAM(s) Oral two times a day  ivabradine 5 milliGRAM(s) Oral two times a day  metoprolol tartrate 25 milliGRAM(s) Oral every 8 hours  multivitamin Oral Tab/Cap - Peds 1 Tablet(s) Oral daily  naproxen 250 milliGRAM(s) Oral two times a day  polyethylene glycol 3350 17 Gram(s) Oral daily  sodium chloride 0.9%. 1000 milliLiter(s) (75 mL/Hr) IV Continuous <Continuous>    MEDICATIONS  (PRN):  guaifenesin/dextromethorphan Oral Liquid 10 milliLiter(s) Oral every 4 hours PRN Cough      FAMILY HISTORY:    SOCIAL HISTORY: negative for tobacco, alcohol, or ilicit drug use.    Allergies    amoxicillin (Unknown)  Ceclor (Rash)  ciprofloxacin (Unknown)  clindamycin (Unknown)  flu vaccines (Other)  gabapentin (Unknown)  Influenza Virus Vaccine, H5N1, Inactivated (Unknown)  penicillins (Unknown)    Intolerances        Physical exam:  Constitutional: Mildly anxious, conversant  Eyes: Anicteric sclerae, moist conjunctivae, see below for CNs  Neck: trachea midline, FROM, supple, no thyromegaly or lymphadenopathy  Cardiovascular: Regular rate and rhythm, no murmurs, rubs, or gallops. No carotid bruits.   Pulmonary: Anterior breath sounds clear bilaterally, no crackles or wheezing. No use of accessory muscles  GI: Abdomen soft, non-distended, non-tender  Extremities: Radial and DP pulses +2, no edema    Neurologic:  -Mental status: Awake, alert, oriented to person, place, and time. Speech is fluent with intact naming, repetition, and comprehension, no dysarthria. Recent and remote memory intact. Follows commands. Attention/concentration intact. Fund of knowledge appropriate.  -Cranial nerves:   II: Visual fields are full to confrontation.  III, IV, VI: Extraocular movements are intact without nystagmus. Pupils equally round and reactive to light  V:  Facial sensation V1-V3 equal and intact   VII: Face is symmetric with normal eye closure and smile  VIII: Hearing is bilaterally intact to finger rub  IX, X: Uvula is midline and soft palate rises symmetrically  XI: Head turning and shoulder shrug are intact.  XII: Tongue protrudes midline  Motor: Normal bulk and tone. No pronator drift. Strength bilateral upper extremity 5/5, bilateral lower extremities 5/5.  Rapid alternating movements intact and symmetric  Sensation: Intact to light touch bilaterally. No neglect or extinction on double simultaneous testing.  Coordination: No dysmetria  Reflexes: 2+ B/L upper and LE.    Gait: Narrow gait and steady    NIHSS: 0    LABS:                        10.6   4.54  )-----------( 425      ( 2022 11:47 )             34.4         132<L>  |  98  |  7<L>  ----------------------------<  262<H>  5.1<H>   |  21  |  0.6<L>    Ca    9.4      2022 11:47    TPro  7.6  /  Alb  4.5  /  TBili  0.3  /  DBili  x   /  AST  43<H>  /  ALT  83<H>  /  AlkPhos  122<H>      Hemoglobin A1C:   Vitamin B12     CAPILLARY BLOOD GLUCOSE      POCT Blood Glucose.: 91 mg/dL (2022 22:43)      Urinalysis Basic - ( 2022 12:55 )    Color: Yellow / Appearance: Clear / S.024 / pH: x  Gluc: x / Ketone: Negative  / Bili: Negative / Urobili: <2 mg/dL   Blood: x / Protein: Trace / Nitrite: Negative   Leuk Esterase: Negative / RBC: x / WBC x   Sq Epi: x / Non Sq Epi: x / Bacteria: x            Microbiology:      RADIOLOGY, EKG AND ADDITIONAL TESTS: Reviewed.  `< from: CT Angio Chest PE Protocol w/ IV Cont (22 @ 13:17) >  IMPRESSION:    1.  No evidence of a pulmonary embolism.    < end of copied text >  < from: Xray Chest 1 View- PORTABLE-Urgent (22 @ 12:43) >  Impression:    No radiographic evidence of acute cardiopulmonary disease.    < end of copied text >           NEUROLOGY CONSULT    HPI:  36-year-old, F, patient, PMHx: Type I DM ( since the age of 16 months) on an insulin pump complicated by Diabetic neuropathy ,MDD, HTN, chronic back pain, migraine headaches, recently admitted to the hospital fo DKA and COVID, discharged yesterday presents to the hospital due to syncope.   Patient reports syncopal episode twice today, which was witnessed by her mother. Pt was at the assisted living when she collapsed, and her mother caught her; second time patient was in bathroom getting dressed and felt lightheaded and weak, and passed out. She reported having continuous chest pain, SOB and palpitations before the event.   + dizzy, weakness, and nausea. Denies head injury, no seizure like activity, no urinary or bowel incontinence, no tongue biting.   Pt dad 3 similar syncopal episodes during her last admission, has also been having retrosternal chest pain for past 3 weeks during her past hospitalization.  Reports lower abdominal pain for past 3 weeks, not sexually active.   Denies fever, chills, headache, SOB, palpitations, urinary symptoms, leg pain, leg swelling, and pain elsewhere.    Labs significant for glucose 262. Hgb 10.6, WBC 4.5. Na 132. UA negative. Trop negative.   EKG QTc 492.  CXR negative for cardiopulmonary disease. CTA negative for Pulm embolism.  Pt was given 2g Mag and 2L of LR bolus in ED.      (2022 18:34)    Neurology assessment:   Ptn states she had 3 falls during the recent hospital stay, had Hx of RUE shakes and tongue bite during ICU stay, rEEG and 24h EEG resulted negative for any epileptiform activities, background 10HZ. Pt was at the assisted living when she collapsed, and her mother caught her; second time patient was in bathroom getting dressed and felt lightheaded and weak, and passed out.  she experienced nausea before the episodes and felt nauseous and tired afterwards, does not remember about the incidence, denies incontinence, tongue bite or any shake. She also complains of being more anxious recently. She had episode of hypoglycemia and received a new insuline pump 2 days ago, mentioned reading of finger stick glucose 55 today. Labs . SHe is currently back to baseline and no complains.      MEDICATIONS  Home Medications:  amitriptyline 75 mg oral tablet: 1 tab(s) orally once a day (at bedtime) (2021 15:06)  ammonium lactate 12% topical cream: Apply topically to affected area 2 times a day (23 Dec 2021 13:53)  aspirin 81 mg oral tablet, chewable: 1 tab(s) orally once a day (28 Dec 2021 09:29)  cyclobenzaprine 10 mg oral tablet: 1 tab(s) orally once a day (at bedtime) (28 Dec 2021 08:33)  famotidine 40 mg oral tablet: 1 tab(s) orally 2 times a day (2022 14:26)  metoprolol tartrate 25 mg oral tablet: 1 tab(s) orally 2 times a day (2021 15:06)  multivitamin: 1 tab(s) orally once a day (2021 15:06)    MEDICATIONS  (STANDING):  amitriptyline Oral Tab/Cap - Peds 75 milliGRAM(s) Oral at bedtime  ammonium lactate 12% Lotion 1 Application(s) Topical two times a day  aspirin  chewable 81 milliGRAM(s) Oral daily  chlorhexidine 4% Liquid 1 Application(s) Topical <User Schedule>  cyclobenzaprine Oral Tab/Cap - Peds 10 milliGRAM(s) Oral once  enoxaparin Injectable 40 milliGRAM(s) SubCutaneous daily  escitalopram 5 milliGRAM(s) Oral daily  famotidine    Tablet 40 milliGRAM(s) Oral two times a day  ivabradine 5 milliGRAM(s) Oral two times a day  metoprolol tartrate 25 milliGRAM(s) Oral every 8 hours  multivitamin Oral Tab/Cap - Peds 1 Tablet(s) Oral daily  naproxen 250 milliGRAM(s) Oral two times a day  polyethylene glycol 3350 17 Gram(s) Oral daily  sodium chloride 0.9%. 1000 milliLiter(s) (75 mL/Hr) IV Continuous <Continuous>    MEDICATIONS  (PRN):  guaifenesin/dextromethorphan Oral Liquid 10 milliLiter(s) Oral every 4 hours PRN Cough      FAMILY HISTORY:    SOCIAL HISTORY: negative for tobacco, alcohol, or ilicit drug use.    Allergies    amoxicillin (Unknown)  Ceclor (Rash)  ciprofloxacin (Unknown)  clindamycin (Unknown)  flu vaccines (Other)  gabapentin (Unknown)  Influenza Virus Vaccine, H5N1, Inactivated (Unknown)  penicillins (Unknown)    Intolerances        Physical exam:  Constitutional: Mildly anxious, conversant  Eyes: Anicteric sclerae, moist conjunctivae, see below for CNs  Neck: trachea midline, FROM, supple, no thyromegaly or lymphadenopathy  Cardiovascular: Regular rate and rhythm, no murmurs, rubs, or gallops. No carotid bruits.   Pulmonary: Anterior breath sounds clear bilaterally, no crackles or wheezing. No use of accessory muscles  GI: Abdomen soft, non-distended, non-tender  Extremities: Radial and DP pulses +2, no edema    Neurologic:  -Mental status: Awake, alert, oriented to person, place, and time. Speech is fluent with intact naming, repetition, and comprehension, no dysarthria. Recent and remote memory intact. Follows commands. Attention/concentration intact. Fund of knowledge appropriate.  -Cranial nerves:   II: Visual fields are full to confrontation.  III, IV, VI: Extraocular movements are intact without nystagmus. Pupils equally round and reactive to light  V:  Facial sensation V1-V3 equal and intact   VII: Face is symmetric with normal eye closure and smile  VIII: Hearing is bilaterally intact to finger rub  IX, X: Uvula is midline and soft palate rises symmetrically  XI: Head turning and shoulder shrug are intact.  XII: Tongue protrudes midline  Motor: Normal bulk and tone. No pronator drift. Strength bilateral upper extremity 5/5, bilateral lower extremities 5/5.  Rapid alternating movements intact and symmetric  Sensation: Intact to light touch bilaterally. No neglect or extinction on double simultaneous testing.  Coordination: No dysmetria  Reflexes: 2+ B/L upper and LE.    Gait: Narrow gait and steady    NIHSS: 0    LABS:                        10.6   4.54  )-----------( 425      ( 2022 11:47 )             34.4         132<L>  |  98  |  7<L>  ----------------------------<  262<H>  5.1<H>   |  21  |  0.6<L>    Ca    9.4      2022 11:47    TPro  7.6  /  Alb  4.5  /  TBili  0.3  /  DBili  x   /  AST  43<H>  /  ALT  83<H>  /  AlkPhos  122<H>      Hemoglobin A1C:   Vitamin B12     CAPILLARY BLOOD GLUCOSE      POCT Blood Glucose.: 91 mg/dL (2022 22:43)      Urinalysis Basic - ( 2022 12:55 )    Color: Yellow / Appearance: Clear / S.024 / pH: x  Gluc: x / Ketone: Negative  / Bili: Negative / Urobili: <2 mg/dL   Blood: x / Protein: Trace / Nitrite: Negative   Leuk Esterase: Negative / RBC: x / WBC x   Sq Epi: x / Non Sq Epi: x / Bacteria: x            Microbiology:      RADIOLOGY, EKG AND ADDITIONAL TESTS: Reviewed.  `< from: CT Angio Chest PE Protocol w/ IV Cont (22 @ 13:17) >  IMPRESSION:    1.  No evidence of a pulmonary embolism.    < end of copied text >  < from: Xray Chest 1 View- PORTABLE-Urgent (22 @ 12:43) >  Impression:    No radiographic evidence of acute cardiopulmonary disease.    < end of copied text >           NEUROLOGY CONSULT    HPI:  36-year-old, F, patient, PMHx: Type I DM ( since the age of 16 months) on an insulin pump complicated by Diabetic neuropathy ,MDD, HTN, chronic back pain, migraine headaches, recently admitted to the hospital fo DKA and COVID, discharged yesterday presents to the hospital due to syncope.   Patient reports syncopal episode twice today, which was witnessed by her mother. Pt was at the assisted living when she collapsed, and her mother caught her; second time patient was in bathroom getting dressed and felt lightheaded and weak, and passed out. She reported having continuous chest pain, SOB and palpitations before the event.   + dizzy, weakness, and nausea. Denies head injury, no seizure like activity, no urinary or bowel incontinence, no tongue biting.   Pt had 3 similar syncopal episodes during her last admission, has also been having retrosternal chest pain for past 3 weeks during her past hospitalization.  Reports lower abdominal pain for past 3 weeks, not sexually active.   Denies fever, chills, headache, SOB, palpitations, urinary symptoms, leg pain, leg swelling, and pain elsewhere.    Labs significant for glucose 262. Hgb 10.6, WBC 4.5. Na 132. UA negative. Trop negative.   EKG QTc 492.  CXR negative for cardiopulmonary disease. CTA negative for Pulm embolism.  Pt was given 2g Mag and 2L of LR bolus in ED.      (2022 18:34)    Neurology assessment:   Ptn states she had 3 falls during the recent hospital stay, had Hx of RUE shakes and tongue bite during ICU stay, rEEG and 24h EEG resulted negative for any epileptiform activities, background 10HZ. Pt was at the assisted living when she collapsed, and her mother caught her; second time patient was in bathroom getting dressed and felt lightheaded and weak, and passed out.  she experienced nausea before the episodes and felt nauseous and tired afterwards, does not remember about the incidence, denies incontinence, tongue bite or any shake. She also complains of being more anxious recently. She had episode of hypoglycemia and received a new insuline pump 2 days ago, mentioned reading of finger stick glucose 55 today. Labs . SHe is currently back to baseline and no complains.      MEDICATIONS  Home Medications:  amitriptyline 75 mg oral tablet: 1 tab(s) orally once a day (at bedtime) (2021 15:06)  ammonium lactate 12% topical cream: Apply topically to affected area 2 times a day (23 Dec 2021 13:53)  aspirin 81 mg oral tablet, chewable: 1 tab(s) orally once a day (28 Dec 2021 09:29)  cyclobenzaprine 10 mg oral tablet: 1 tab(s) orally once a day (at bedtime) (28 Dec 2021 08:33)  famotidine 40 mg oral tablet: 1 tab(s) orally 2 times a day (2022 14:26)  metoprolol tartrate 25 mg oral tablet: 1 tab(s) orally 2 times a day (2021 15:06)  multivitamin: 1 tab(s) orally once a day (2021 15:06)    MEDICATIONS  (STANDING):  amitriptyline Oral Tab/Cap - Peds 75 milliGRAM(s) Oral at bedtime  ammonium lactate 12% Lotion 1 Application(s) Topical two times a day  aspirin  chewable 81 milliGRAM(s) Oral daily  chlorhexidine 4% Liquid 1 Application(s) Topical <User Schedule>  cyclobenzaprine Oral Tab/Cap - Peds 10 milliGRAM(s) Oral once  enoxaparin Injectable 40 milliGRAM(s) SubCutaneous daily  escitalopram 5 milliGRAM(s) Oral daily  famotidine    Tablet 40 milliGRAM(s) Oral two times a day  ivabradine 5 milliGRAM(s) Oral two times a day  metoprolol tartrate 25 milliGRAM(s) Oral every 8 hours  multivitamin Oral Tab/Cap - Peds 1 Tablet(s) Oral daily  naproxen 250 milliGRAM(s) Oral two times a day  polyethylene glycol 3350 17 Gram(s) Oral daily  sodium chloride 0.9%. 1000 milliLiter(s) (75 mL/Hr) IV Continuous <Continuous>    MEDICATIONS  (PRN):  guaifenesin/dextromethorphan Oral Liquid 10 milliLiter(s) Oral every 4 hours PRN Cough      FAMILY HISTORY:    SOCIAL HISTORY: negative for tobacco, alcohol, or ilicit drug use.    Allergies    amoxicillin (Unknown)  Ceclor (Rash)  ciprofloxacin (Unknown)  clindamycin (Unknown)  flu vaccines (Other)  gabapentin (Unknown)  Influenza Virus Vaccine, H5N1, Inactivated (Unknown)  penicillins (Unknown)    Intolerances        Physical exam:  Constitutional: Mildly anxious, conversant  Eyes: Anicteric sclerae, moist conjunctivae, see below for CNs  Neck: trachea midline, FROM, supple, no thyromegaly or lymphadenopathy  Cardiovascular: Regular rate and rhythm, no murmurs, rubs, or gallops. No carotid bruits.   Pulmonary: Anterior breath sounds clear bilaterally, no crackles or wheezing. No use of accessory muscles  GI: Abdomen soft, non-distended, non-tender  Extremities: Radial and DP pulses +2, no edema    Neurologic:  -Mental status: Awake, alert, oriented to person, place, and time. Speech is fluent with intact naming, repetition, and comprehension, no dysarthria. Recent and remote memory intact. Follows commands. Attention/concentration intact. Fund of knowledge appropriate.  -Cranial nerves:   II: Visual fields are full to confrontation.  III, IV, VI: Extraocular movements are intact without nystagmus. Pupils equally round and reactive to light  V:  Facial sensation V1-V3 equal and intact   VII: Face is symmetric with normal eye closure and smile  VIII: Hearing is bilaterally intact to finger rub  IX, X: Uvula is midline and soft palate rises symmetrically  XI: Head turning and shoulder shrug are intact.  XII: Tongue protrudes midline  Motor: Normal bulk and tone. No pronator drift. Strength bilateral upper extremity 5/5, bilateral lower extremities 5/5.  Rapid alternating movements intact and symmetric  Sensation: Intact to light touch bilaterally. No neglect or extinction on double simultaneous testing.  Coordination: No dysmetria  Reflexes: 2+ B/L upper and LE.    Gait: Narrow gait and steady    NIHSS: 0    LABS:                        10.6   4.54  )-----------( 425      ( 2022 11:47 )             34.4         132<L>  |  98  |  7<L>  ----------------------------<  262<H>  5.1<H>   |  21  |  0.6<L>    Ca    9.4      2022 11:47    TPro  7.6  /  Alb  4.5  /  TBili  0.3  /  DBili  x   /  AST  43<H>  /  ALT  83<H>  /  AlkPhos  122<H>      Hemoglobin A1C:   Vitamin B12     CAPILLARY BLOOD GLUCOSE      POCT Blood Glucose.: 91 mg/dL (2022 22:43)      Urinalysis Basic - ( 2022 12:55 )    Color: Yellow / Appearance: Clear / S.024 / pH: x  Gluc: x / Ketone: Negative  / Bili: Negative / Urobili: <2 mg/dL   Blood: x / Protein: Trace / Nitrite: Negative   Leuk Esterase: Negative / RBC: x / WBC x   Sq Epi: x / Non Sq Epi: x / Bacteria: x            Microbiology:      RADIOLOGY, EKG AND ADDITIONAL TESTS: Reviewed.  `< from: CT Angio Chest PE Protocol w/ IV Cont (22 @ 13:17) >  IMPRESSION:    1.  No evidence of a pulmonary embolism.    < end of copied text >  < from: Xray Chest 1 View- PORTABLE-Urgent (22 @ 12:43) >  Impression:    No radiographic evidence of acute cardiopulmonary disease.    < end of copied text >

## 2022-01-13 NOTE — ED CDU PROVIDER DISPOSITION NOTE - CLINICAL COURSE
37 yo female w/ PMH of DM, DKA, HTN, recurrent UTIs, recently admitted for DKA and COVID+ 3 weeks ago Presents for syncope.  According to patient she has had 2 episodes of syncope within the last 24 hours, has had prior episodes as well.  She has seen cardiology and had echo and Holter in the past.  ED work-up was unremarkable, patient is nontoxic-appearing on my eval, no orthostasis.  Lungs clear, CV S1-S2, RRR, no murmur.  Abdomen soft, no peripheral edema.  EP was consulted due to recurrent symptoms, recommended admission for additional work-up and management.  Patient amenable to plan.

## 2022-01-13 NOTE — H&P ADULT - ASSESSMENT
36-year-old, F, patient, PMHx: Type I DM ( since the age of 16 months) on an insulin pump complicated by Diabetic neuropathy , HTN, chronic back pain, migraine headaches, recently admitted to the hospital fo DKA and COVID, discharged yesterday, presents to the hospital due to syncope.    # fainting x2, r/o syncope  # r/o Munchausen's syndrome, factitious disorder. Pt with multiple somatic complaints  - Tele admit  - trops neg x 2, f/u cardiac enzymes  - orthostatic VS negative in ED  - f/u EKG in AM  - EP consult appreciated.  - s/p 2L LR bolus in ED, c/w IV hydration  - Psychiatry and Neurology cs  - obtain records from patient's cardiologist (Dr. Christian), check results of stress echo and holter monitor( pt reports it was done in nov. 2021)  - Start corlanor 5mg po bid  - Increase metoprolol to 25mg q8hrs  - CCTA  - 30-day cardiac monitor (MCOT) prior to discharg- f/u TSH, folate/B12   - Fall precautions.     # Hx of COVID-19 PNA, resolved  - c/w Robitussin 10mL Q4 PRN Cough    # Abdominal Pain, diarrhea  # mild LFTs elevation  - CT Abdomen and Pelvis w/ IV Cont 12/2/21:  No acute intra-abdominal or pelvic pathology is identified  - RUQ US no significant findings  - monitor LFTs  - follow up outpatient with GI For MRI of liver lesions and colonoscopy to r/o IBD    #  Type I DM  # Diabetic neuropathy  - pt is on insulin pump  - insulin lantus 40 units AM, 13 units bedtime, lispro 10 units before meals, SS    # Anxiety Disorder   - c/w lexapro 5 mg and amitriptyline 75mg HS    Diet: CC  Activity: as tolerated  DVT Prophylaxis: lovenox  GI Prophylaxis: famotidine  CHG Order  Code Status: full code  Disposition: telemetry     36-year-old, F, patient, PMHx: Type I DM ( since the age of 16 months) on an insulin pump complicated by Diabetic neuropathy , HTN, chronic back pain, migraine headaches, recently admitted to the hospital fo DKA and COVID, discharged yesterday, presents to the hospital due to syncope.    # fainting x2, r/o syncope  # r/o Munchausen's syndrome, factitious disorder. Pt with multiple somatic complaints  - Tele admit  - trops neg x 2, f/u cardiac enzymes  - orthostatic VS negative in ED  - ECG (01.13.22 @ 12:13): Line Sinus rhythm with short WY; Prolonged QT  - f/u EKG in AM  - EP consult appreciated.  - s/p 2L LR bolus in ED, c/w IV hydration  - Psychiatry and Neurology cs  - obtain records from patient's cardiologist (Dr. Christian), check results of stress echo and holter monitor( pt reports it was done in nov. 2021)  - Start corlanor 5mg po bid  - Increase metoprolol to 25mg q8hrs  - CCTA  - 30-day cardiac monitor (MCOT) prior to discharg- f/u TSH, folate/B12   - Fall precautions.     # Hx of COVID-19 PNA, resolved  - c/w Robitussin 10mL Q4 PRN Cough    # Abdominal Pain,  # mild LFTs elevation  - CT Abdomen and Pelvis w/ IV Cont 12/2/21:  No acute intra-abdominal or pelvic pathology is identified  - RUQ US no significant findings  - monitor LFTs  - follow up outpatient with GI For MRI of liver lesions and colonoscopy to r/o IBD    #  Type I DM   # Diabetic neuropathy  - pt is on insulin pump ( insulin supply will last for 3 days in the pump. May need to switch to subQ when insulin running out from the pump, or get non-formulary from pharmacy)    # Anxiety Disorder   - c/w lexapro 5 mg and amitriptyline 75mg HS    Diet: CC  Activity: as tolerated  DVT Prophylaxis: lovenox subQ  GI Prophylaxis: famotidine  CHG Order  Code Status: full code  Disposition: telemetry

## 2022-01-13 NOTE — CONSULT NOTE ADULT - SUBJECTIVE AND OBJECTIVE BOX
HPI:  37 yo female patient with PMHx of DMI on insulin pump, diabetic neuropathy, HTN, anxiety/depression, history of suicide attempts 3x during adolescence, pseudoseizures?, recent admission for DKA complicated by COVID-19 infection, discharged yesterday (lives at assisted living facility), coming back today because of 2 episodes of fainting.  Patient says she was at the assisted living, waiting for the elevator, when she collapsed in her mom's hands. She said she was having continuous chest pain before the event, as well as shortness of breath and palpitations. She says she was dizzy, weak, and nauseous and shaking. The second time, she was wearing her clothes, alone, and also fainted.    PAST MEDICAL & SURGICAL HISTORY  Neuropathy  right lower extremity, vaginal    Type 1 diabetes    Degenerative disc disease, thoracic    Urinary tract infection, recurrent    Gastroesophageal reflux disease, esophagitis presence not specified    Intractable headache, unspecified chronicity pattern, unspecified headache type    Major depressive disorder with single episode, in full remission  previously treated with zyprexa, celexa and lexapro    Tremor of right hand    Tachycardia    Spinal stenosis    No significant past surgical history      SOCIAL HISTORY:  []smoker  []Alcohol  []Drug    ALLERGIES:  amoxicillin (Unknown)  Ceclor (Rash)  ciprofloxacin (Unknown)  clindamycin (Unknown)  flu vaccines (Other)  gabapentin (Unknown)  Influenza Virus Vaccine, H5N1, Inactivated (Unknown)  penicillins (Unknown)    HOME MEDICATIONS:  amitriptyline 75 mg oral tablet: 1 tab(s) orally once a day (at bedtime) (04 Nov 2021 15:06)  ammonium lactate 12% topical cream: Apply topically to affected area 2 times a day (23 Dec 2021 13:53)  aspirin 81 mg oral tablet, chewable: 1 tab(s) orally once a day (28 Dec 2021 09:29)  cyclobenzaprine 10 mg oral tablet: 1 tab(s) orally once a day (at bedtime) (28 Dec 2021 08:33)  famotidine 40 mg oral tablet: 1 tab(s) orally 2 times a day (11 Jan 2022 14:26)  metoprolol tartrate 25 mg oral tablet: 1 tab(s) orally 2 times a day (04 Nov 2021 15:06)  multivitamin: 1 tab(s) orally once a day (04 Nov 2021 15:06)      VITALS:   T(F): 98.9 (01-13 @ 15:30), Max: 98.9 (01-13 @ 15:30)  HR: 108 (01-13 @ 15:30) (81 - 129)  BP: 139/83 (01-13 @ 15:30) (109/58 - 139/83)  BP(mean): --  RR: 18 (01-13 @ 15:30) (18 - 19)  SpO2: 100% (01-13 @ 15:30) (98% - 100%)    I&O's Summary      REVIEW OF SYSTEMS:  CONSTITUTIONAL: (+) weakness, no fevers or chills  EYES: No visual changes  ENT: No vertigo or throat pain (+) dizziness  NECK: No pain or stiffness  RESPIRATORY: (+) cough, wheezing, no hemoptysis; (+) shortness of breath  CARDIOVASCULAR: (+) chest pain (+) palpitations  GASTROINTESTINAL: (+) abdominal pain. (+) nausea (+) vomiting, no hematemesis; (+) diarrhea no constipation. (+) melena (+) hematochezia.  GENITOURINARY: No dysuria, frequency or hematuria  NEUROLOGICAL: No numbness or weakness  SKIN: No itching, no rashes  MSK: (+) pain    PHYSICAL EXAM:  NEURO: patient is awake , alert and oriented  GEN: Not in acute distress  NECK: no thyroid enlargement, no JVD  LUNGS: Clear to auscultation bilaterally   CARDIOVASCULAR: S1/S2 present, RRR , no murmurs or rubs, no carotid bruits,  + PP bilaterally  ABD: Soft, non-tender, non-distended, +BS  EXT: No KASSANDRA  SKIN: Intact    LABS:                        10.6   4.54  )-----------( 425      ( 13 Jan 2022 11:47 )             34.4     01-13    132<L>  |  98  |  7<L>  ----------------------------<  262<H>  5.1<H>   |  21  |  0.6<L>    Ca    9.4      13 Jan 2022 11:47    TPro  7.6  /  Alb  4.5  /  TBili  0.3  /  DBili  x   /  AST  43<H>  /  ALT  83<H>  /  AlkPhos  122<H>  01-13      Troponin T, Serum: <0.01 ng/mL (01-13-22 @ 11:47)  Lactate, Blood: 1.9 mmol/L (01-13-22 @ 11:47)    CARDIAC MARKERS ( 13 Jan 2022 11:47 )  x     / <0.01 ng/mL / x     / x     / x            Troponin trend:    Serum Pro-Brain Natriuretic Peptide: <5 pg/mL (01-13-22 @ 11:47)          RADIOLOGY:  -CXR: No acute pathology    -TTE: < from: TTE Echo Complete w/o Contrast w/ Doppler (12.24.21 @ 07:28) >    Summary:   1. Normal global left ventricular systolic function.   2. LV Ejection Fraction by Leahy's Method with a biplane EF of 56 %.   3. Normal left ventricular internal cavity size.   4. The mean global longitudinal peak strain by speckle tracking is   -16.8% which is borderline normal.   5. Normal left atrial size.   6. Normal right atrial size.   7. No evidence of mitral valve regurgitation.   8. Mild tricuspid regurgitation.   9. LA volume Index is 21.2 ml/m² ml/m2.    < end of copied text >    -STRESS TEST: < from: NM Nuclear Stress Pharmacologic Multiple (03.20.18 @ 10:29) >  Impression:  1. IV Adenosine Dual Isotope Study which was negative with respect to   symptoms and EKG changes.  2. Myocardial perfusion imaging reveals a moderate in size mild to   moderate in severity distal anterior, anteroapical defect with complete   reversibility which may be consistent with ischemia in the distribution   of the mid to distal left anterior descending coronary artery  3. Gated imaging reveals mild global hypokinesia with normal thickening   with ejection fraction borderline low at 50%  Recommendation:  Medical therapy.    Risk factor modification.    Compare to two-dimensional echo with Doppler.    In lieu of quality of thestudy with marked hepatobiliary uptake which   may accentuate the inferior wall resulting in a false positive anterior   wall defect with consider CTA.    < end of copied text >      ECG:  sinus tachycardia 100bpm

## 2022-01-13 NOTE — ED CDU PROVIDER INITIAL DAY NOTE - PHYSICAL EXAMINATION
GENERAL: NAD   	SKIN: warm, dry  	HEAD: Normocephalic; atraumatic.  	EYES: PERRLA, EOMI   	CARD: S1, S2 normal; no murmurs, gallops, or rubs. tachycardic  	RESP: LCTAB; No wheezes, rales, rhonchi, or stridor.  	ABD: Diffuse TTP  	BACK: No CVA tenderness   	EXT: No LE TTP or edema bilaterally.  	NEURO: Alert, oriented, grossly unremarkable. Strength 5/5 and sensation intact in bilateral UE and LEs. CN2-12 grossly intact.   PSYCH: Cooperative, appropriate.

## 2022-01-13 NOTE — H&P ADULT - HISTORY OF PRESENT ILLNESS
36-year-old, F, patient, PMHx: Type I DM ( since the age of 16 months) on an insulin pump complicated by Diabetic neuropathy , HTN, chronic back pain, migraine headaches, recently admitted to the hospital fo DKA and COVID, discharged yesterday presents to the hospital due to syncope.   Patient reports syncopal episode twice today, which was witnessed by her mother. Pt was at the assisted living when she collapsed, and her mother caught her; second time patient was in bathroom getting dressed and felt lightheaded and weak, and passed out. She reported having continuous chest pain, SOB and palpitations before the event.   + dizzy, weakness, and nausea. Denies head injury, no seizure like activity, no urinary or bowel incontinence, no tongue biting.   Pt dad 3 similar syncopal episodes during her last admission, has also been having retrosternal chest pain for past 3 weeks during her past hospitalization.  Reports lower abdominal pain for past 3 weeks, not sexually active.   Denies fever, chills, headache, SOB, palpitations, urinary symptoms, leg pain, leg swelling, and pain elsewhere.    Labs significant for glucose 262. Hgb 10.6, WBC 4.5. Na 132. UA negative. Trop negative.   EKG QTc 492.  CXR negative for cardiopulmonary disease. CTA negative for Pulm embolism.  Pt was given 2g Mag and 2L of LR bolus in ED.

## 2022-01-13 NOTE — H&P ADULT - NSHPPHYSICALEXAM_GEN_ALL_CORE
PHYSICAL EXAM:  GENERAL: NAD, AAO x 4, 36y F  HEAD:  Atraumatic, Normocephalic  EYES: EOMI, conjunctiva and sclera white  NECK: Supple, No JVD  CHEST/LUNG: Clear to auscultation bilaterally; No wheeze; No crackles; No accessory muscles used  HEART: Regular rate and rhythm; No murmurs;   ABDOMEN: Soft, Nontender, Nondistended; Bowel sounds present; No guarding, No organomegaly  EXTREMITIES:  2+ Peripheral Pulses, No cyanosis or edema  NEUROLOGY: non-focal

## 2022-01-13 NOTE — H&P ADULT - NSHPRISKHIVSCREEN_GEN_ALL_CORE
Offered and patient declined

## 2022-01-13 NOTE — ED PROVIDER NOTE - PROGRESS NOTE DETAILS
ED Attending OBIE Barreto  Pt aware of all results, reports no syncope work up including during her previous admission, due to comorbidities and frequency of syncope pt did not feel comfortable going home, discussed obs for syncope work up, pt aware and agrees, obs team aware of pt.

## 2022-01-13 NOTE — H&P ADULT - NSHPLABSRESULTS_GEN_ALL_CORE
LABS:                        10.6   4.54  )-----------( 425      ( 2022 11:47 )             34.4           132<L>  |  98  |  7<L>  ----------------------------<  262<H>  5.1<H>   |  21  |  0.6<L>    Ca    9.4      2022 11:47    TPro  7.6  /  Alb  4.5  /  TBili  0.3  /  DBili  x   /  AST  43<H>  /  ALT  83<H>  /  AlkPhos  122<H>                Urinalysis Basic - ( 2022 12:55 )    Color: Yellow / Appearance: Clear / S.024 / pH: x  Gluc: x / Ketone: Negative  / Bili: Negative / Urobili: <2 mg/dL   Blood: x / Protein: Trace / Nitrite: Negative   Leuk Esterase: Negative / RBC: x / WBC x   Sq Epi: x / Non Sq Epi: x / Bacteria: x            Lactate Trend   @ 11:47 Lactate:1.9       CARDIAC MARKERS ( 2022 15:26 )  x     / <0.01 ng/mL / x     / x     / x      CARDIAC MARKERS ( 2022 11:47 )  x     / <0.01 ng/mL / x     / x     / x            CAPILLARY BLOOD GLUCOSE      POCT Blood Glucose.: 91 mg/dL (2022 22:43)        Culture Results:   No enteric pathogens isolated.  (Stool culture examined for Salmonella,  Shigella, Campylobacter, Aeromonas, Plesiomonas,  Vibrio, E.coli O157 and Yersinia) ( @ 12:20)  Culture Results:   <10,000 CFU/mL Normal Urogenital Yadira ( @ 03:38)  Culture Results:   No Growth Final ( @ 23:30)  Culture Results:   No Growth Final ( @ 22:44)  Culture Results:   10,000 - 49,000 CFU/mL Streptococcus agalactiae (Group B) isolated  Group B streptococci are susceptible to ampicillin,  penicillin and cefazolin, but may be resistant to  erythromycin and clindamycin.  Recommendations for intrapartum prophylaxis for Group B  streptococci are penicillin or ampicillin. ( @ 09:44)    < from: CT Angio Chest PE Protocol w/ IV Cont (22 @ 13:17) >      IMPRESSION:    1.  No evidence of a pulmonary embolism.    < end of copied text >    < from: Xray Chest 1 View- PORTABLE-Urgent (22 @ 12:43) >    Impression:    No radiographic evidence of acute cardiopulmonary disease.    < end of copied text >

## 2022-01-13 NOTE — ED PROVIDER NOTE - NS ED ROS FT
Constitutional: No fevers, chills, or malaise.  HEENT: No headache, visual changes  Cardiac:  Reports chest pain. No SOB, leg edema, or leg pain.  Respiratory:  No cough, respiratory distress, or hemoptysis.  GI:  No nausea, vomiting, diarrhea, or abdominal pain.  :  No dysuria, frequency, or urgency.  MS:  No myalgia, muscle weakness  Neuro:  Reports LOC. No paralysis, or N/T.  Skin:  No skin rash.   Endocrine: No polyuria, polyphagia, or polydipsia.

## 2022-01-13 NOTE — CONSULT NOTE ADULT - ASSESSMENT
36-year-old, F, patient, PMHx: Type I DM ( since the age of 16 months) on an insulin pump (New device 2 days ago) , HTN, migraine headaches, MDD, recently admitted to the hospital fo DKA and COVID, discharged yesterday presents to the hospital due to passing out. two episodes today, which was witnessed by her mother. Pt was at the assisted living when she collapsed, and her mother caught her; second time patient was in bathroom getting dressed and felt lightheaded and weak, and passed out. She reported having nausea, continuous chest pain, SOB and palpitations before the event.   Denies head injury, no seizure like activity, no urinary or bowel incontinence, no tongue biting.   Pt dad 3 similar syncopal episodes during her last admission, has also been having retrosternal chest pain for past 3 weeks during her past hospitalization.   Recent rEEG and 24h EEG resulted negative for any epileptiform activities, background 10HZ. She had episodes of hypoglycemia and received a new insuline pump 2 days ago, mentioned reading of finger stick glucose 55 today. Labs . She is currently back to baseline and no complains.    Seizure vs hypoglycemia vs factitious disorder vs panic attack:    Recs:   rEEG  CT scan of head w and w/o  CTA Head and neck  Reassess the insuline pump function for possible hypoglycemia episodes  Psychiatric consult.      Attending note will follow.    36-year-old, F, patient, PMHx: Type I DM ( since the age of 16 months) on an insulin pump (New device 2 days ago) , HTN, migraine headaches, MDD, recently admitted to the hospital fo DKA and COVID, discharged yesterday presents to the hospital due to passing out. two episodes today, which was witnessed by her mother. Pt was at the assisted living when she collapsed, and her mother caught her; second time patient was in bathroom getting dressed and felt lightheaded and weak, and passed out. She reported having nausea, continuous chest pain, SOB and palpitations before the event.   Denies head injury, no seizure like activity, no urinary or bowel incontinence, no tongue biting.   Pt dad 3 similar syncopal episodes during her last admission, has also been having retrosternal chest pain for past 3 weeks during her past hospitalization.   Recent rEEG and 24h EEG resulted negative for any epileptiform activities, background 10HZ. She had episodes of hypoglycemia and received a new insuline pump 2 days ago, mentioned reading of finger stick glucose 55 today. Labs . She is currently back to baseline and no complains.    Seizure vs hypoglycemia vs factitious disorder vs panic attack:    Recs:   rEEG  CT scan of head w and w/o  CTA Head and neck  Reassess the insuline pump function for possible hypoglycemia episodes  Serum insuline and C-Peptide   Psychiatric consult.      Attending note will follow.    36-year-old, F, patient, PMHx: Type I DM ( since the age of 16 months) on an insulin pump (New device 2 days ago) , HTN, migraine headaches, MDD, recently admitted to the hospital fo DKA and COVID, discharged yesterday presents to the hospital due to passing out. two episodes today, which was witnessed by her mother. Pt was at the assisted living when she collapsed, and her mother caught her; second time patient was in bathroom getting dressed and felt lightheaded and weak, and passed out. She reported having nausea, continuous chest pain, SOB and palpitations before the event, no seizure like activity, no urinary or bowel incontinence, no tongue biting.   Pt Had 3 similar syncopal episodes during her last admission, has also been having retrosternal chest pain for past 3 weeks during her past hospitalization.   Recent rEEG and 24h EEG resulted negative for any epileptiform activities, background 10HZ. She reported episodes of hypoglycemia and received a new insuline pump 2 days ago, mentioned reading of finger stick glucose 55 today. Labs . She is currently back to baseline and no complains.    Seizure vs true hypoglycemia vs factitious hypoglycemia and other factitious disorder spectrum vs panic attack:    Recs:   rEEG  CT scan of head w and w/o  CTA Head and neck  Reassess the insuline pump function for possible hypoglycemia episodes  Serum insulin, serum Proinsulin, and C-Peptide, sulfonylurea and meglitinides level.   Utox  Psychiatric consult.      Attending note will follow.    36-year-old, F, patient, PMHx: Type I DM ( since the age of 16 months) on an insulin pump (New device 2 days ago) , HTN, migraine headaches, MDD, recently admitted to the hospital fo DKA and COVID, discharged yesterday presents to the hospital due to passing out. two episodes today, which was witnessed by her mother. Pt was at the assisted living when she collapsed, and her mother caught her; second time patient was in bathroom getting dressed and felt lightheaded and weak, and passed out. She reported having nausea, continuous chest pain, SOB and palpitations before the event, no seizure like activity, no urinary or bowel incontinence, no tongue biting.   Pt Had 3 similar syncopal episodes during her last admission, has also been having retrosternal chest pain for past 3 weeks during her past hospitalization.   Recent rEEG and 24h EEG resulted negative for any epileptiform activities, background 10HZ. She reported episodes of hypoglycemia and received a new insuline pump 2 days ago, mentioned reading of finger stick glucose 55 today. Labs . She is currently back to baseline and no complains.    Seizure vs Panic attack vs true hypoglycemia vs factitious hypoglycemia and other pscyh underlying disorders:   Recs:   rEEG  CT scan of head w and w/o  CTA Head and neck  Reassess the insulin pump  for possible malfunction and hypoglycemia episodes  Serum insulin, serum Proinsulin, and C-Peptide, sulfonylurea and meglitinides level.   Utox, TSH.   Psychiatric consult.      Attending note will follow.    36-year-old, F, patient, PMHx: Type I DM ( since the age of 16 months) on an insulin pump (New device 2 days ago) , HTN, migraine headaches, MDD, recently admitted to the hospital fo DKA and COVID, discharged yesterday presents to the hospital due to passing out. two episodes today, which was witnessed by her mother. Pt was at the assisted living when she collapsed, and her mother caught her; second time patient was in bathroom getting dressed and felt lightheaded and weak, and passed out. She reported having nausea, continuous chest pain, SOB and palpitations before the event, no seizure like activity, no urinary or bowel incontinence, no tongue biting.   Pt Had 3 similar syncopal episodes during her last admission, has also been having retrosternal chest pain for past 3 weeks during her past hospitalization.   Recent rEEG and 24h EEG resulted negative for any epileptiform activities, background 10HZ. She reported episodes of hypoglycemia and received a new insuline pump 2 days ago, mentioned reading of finger stick glucose 55 today. Labs . She is currently back to baseline and no complains.    Seizure vs Panic attack vs true hypoglycemia vs factitious hypoglycemia and other pscyh underlying disorders:   Recs:   rEEG  CT scan of head w and w/o  CTA Head and neck  Medical evaluation for causes of hypoglycemia per primary team.   Utox, TSH.   Psychiatric consult.      Attending note will follow.

## 2022-01-13 NOTE — CONSULT NOTE ADULT - ASSESSMENT
IMPRESSION:  37 yo female, DM I on insulin pump, Anxiety/depression, history of suicide attempts and pseudoseizures, presenting for syncope  - Fainted while waiting for the elevator and when she was wearing her clothes - R/O syncope  - Orthostatic vital signs negative (performed at bedside)  - Chest pain with history of positive nuclear stress test 2018; per patient she had a repeat stress echo in Nov. 2021 and a 24h Holter monitoring with her cardiologist which were negative - no cath performed  - Chronic tachycardia r/o inappropriate sinus tachycardia  - Multiple somatic complaints: dizziness, nausea, vomiting, abdominal pain, diarrhea, blood in stools, chest pain, shortness of breath, palpitations --> R/O Munchausen's syndrome, factitious disorder    PLAN:  - IV hydration (patient has been having diarrhea more than 8 episodes daily?)  - Official psychiatry evaluation  - F/u with patient's cardiologist (Dr. Christian), check results of stress echo and holter monitor that were done in nov. 2021 per patient    ** INCOMPLETE ** IMPRESSION:  37 yo female, DM I on insulin pump, Anxiety/depression, history of suicide attempts and pseudoseizures, presenting for syncope  - Fainted while waiting for the elevator and when she was wearing her clothes - R/O syncope  - Orthostatic vital signs negative (performed at bedside)  - Chest pain with history of positive nuclear stress test 2018; per patient she had a repeat stress echo in Nov. 2021 and a 24h Holter monitoring with her cardiologist which were negative - no cath performed  - Chronic tachycardia r/o inappropriate sinus tachycardia  - Multiple somatic complaints: dizziness, nausea, vomiting, abdominal pain, diarrhea, blood in stools, chest pain, shortness of breath, palpitations --> R/O Munchausen's syndrome, factitious disorder    PLAN:  - IV hydration (patient has been having diarrhea more than 8 episodes daily?)  - Psychiatry evaluation  - Neurology evaluation  - F/u with patient's cardiologist (Dr. Christian), check results of stress echo and holter monitor that were done in nov. 2021 per patient  - Start corlanor 5mg po bid  - Increase metoprolol to 25mg q8hrs  - CCTA  - 30-day cardiac monitor (MCOT) prior to discharge  - Follow-up with EP in a month for MCOT results as outpatient

## 2022-01-13 NOTE — ED CDU PROVIDER DISPOSITION NOTE - ATTENDING CONTRIBUTION TO CARE
35 yo female w/ PMH of DM, DKA, HTN, recurrent UTIs, recently admitted for DKA and COVID+ 3 weeks ago Presents for syncope.  According to patient she has had 2 episodes of syncope within the last 24 hours, has had prior episodes as well.  She has seen cardiology and had echo and Holter in the past.  ED work-up was unremarkable, patient is nontoxic-appearing on my eval, no orthostasis.  Lungs clear, CV S1-S2, RRR, no murmur.  Abdomen soft, no peripheral edema.  EP was consulted due to recurrent symptoms, recommended admission for additional work-up and management.  Patient amenable to plan.

## 2022-01-13 NOTE — ED CDU PROVIDER INITIAL DAY NOTE - ATTENDING CONTRIBUTION TO CARE
37 y/o f w/ pmhx of Type I DM (on an insulin pump complicated by Diabetic neuropathy , HTN, chronic back pain, migraine headaches, (+) COVID, admission from 12/231-1/12 for DKA and abd pain presents today for syncope x 2 today, mom was with pt and caught pt first time, second time was in bathroom getting dressed and felt LH and weak again, and passed out, did not hit head, last few seconds, witnessed, no seizure like activity, no urinary or bowel incontinence, no seizure like activity, no tongue biting.   Had similar episode during admission x3. Pt reports mid sternal chest pain, sharp, intermittent, non radiating, mild in intensity, no alleviating or precipitating factors, associated with sob , worse with deep breaths. Had this pain during admission as well. Pt still with the bad pain she also had during admission, lower, constant, dull, non-radiating, no alleviating or precipitating factors.   denies fever, chills, n/v, palpitations, diaphoresis, cough, ear pain, hearing loss, neck pain/stiffness, back pain, photophobia/phonophobia, blurry vision/visual changes, diarrhea, constipation, melena/brbpr, urinary symptoms, numbness/tingling, recent travel or rash.     on exam:   Constitutional: wdwn pt sitting on stretcher in nad.  Skin: no rash, no signs of trauma:  HEENT: PERRL, EOM intact, no nystagmus, mmm. No tongue deviation.   NECK and BACK: neck supple, no spinous ttp to neck or back, FROM, no palpable shelves or step offs, no meningeal signs.  CARDIO: regular rate, radial pulses 2/4 b/l, dp and pt pulses 2/4 b/l.  Lungs: Ctabl w/ breath sounds present b/l, no wheezing or crackles, no accessory muscle use, no tachypnea, no stridor  ABD: BS present throughout all 4 quadrants, abd soft, nd, nt, no rebound tenderness or guarding, no cvat,;  EXT: FROM of upper and lower ext, no drift, no calf pain/swelling/erythema.  NEURO: AAOx3. Motor 5/5 and sensation intact throughout upper and lower ext. CN II-XII intact. No facial droop or slurring of speech. (-) Pronator (NIHSS O.

## 2022-01-13 NOTE — ED PROVIDER NOTE - OBJECTIVE STATEMENT
37 yo female w/ PMH of DM, DKA, HTN, recurrent UTIs, recently admitted for DKA and COVID+ 3 weeks ago presents for syncopal episodes.  Had 2 episodes today, one witnessed by mother who caught her and another unwitnessed.  Had 3 similar syncopal episodes during her last admission, has also been having retrosternal chest pain, no radiation, no alleviating/provoking factors for past 3 weeks since she was hospitalized.  Reports lower abdominal pain for past 3 weeks, not sexually active. Denies headache, SOB, leg pain, leg swelling, hemoptysis, hx of blood clots, and pain elsewhere.

## 2022-01-13 NOTE — CONSULT NOTE ADULT - ATTENDING COMMENTS
Complex patient   Reported 2 episodes of syncope;  Etiology unclear, could be related to dehydration  Recommend CCTA to r/o obstructive CAD  Recommend MCOT at discharge to evaluate for arrhythmias
Patient seen and examined and agree with above except as noted.  Patients history, notes, labs, imaging, vitals and meds reviewed personally.  Patient with recent evaluation for seizure like episodes found to not be seizures.  Presenting with once again similar possibilities.  Exam normal no tremors or shaking  Unlikely epilepsy and no further neurological workup required at this time    Plan  1. Recall as needed  2. No need ot repeat EEG  3. Cardiac workup  4. Medical management

## 2022-01-13 NOTE — ED PROVIDER NOTE - CARE PLAN
Assessment and plan of treatment:	Plan: Monitor, EKG, CXR, labs, u preg, urine, imaging, reassess.   Principal Discharge DX:	Syncope  Assessment and plan of treatment:	Plan: Monitor, EKG, CXR, labs, u preg, urine, imaging, reassess.   1

## 2022-01-13 NOTE — ED PROVIDER NOTE - CLINICAL SUMMARY MEDICAL DECISION MAKING FREE TEXT BOX
I have fully discussed the medical management and delivery of care with the patient. I have discussed any available labs, imaging and treatment options with the patient.  Pt admitted for further care & management.  Obs team aware of pt. 36 year old female with a history of DM, DKA, HTN, recurrent UTIs, recently admitted for DKA and Covid+ 3 weeks ago presenting for syncopal episodes. and c/o chest pain and sob. No HI, no seizure like activity. On exam. no focal neuro deficits. Labs significant for glucose 262. UA negative for infection or ketones. Trop negative. EKG QTc 492; 2g Mag given. CXR within normal limits. CTA negative for PE. Will place in obs for syncope workup.    I have fully discussed the medical management and delivery of care with the patient. I have discussed any available labs, imaging and treatment options with the patient.  Pt admitted for further care & management.  Obs team aware of pt.

## 2022-01-14 ENCOUNTER — APPOINTMENT (OUTPATIENT)
Dept: GASTROENTEROLOGY | Facility: CLINIC | Age: 37
End: 2022-01-14

## 2022-01-14 DIAGNOSIS — F41.1 GENERALIZED ANXIETY DISORDER: ICD-10-CM

## 2022-01-14 LAB
ALBUMIN SERPL ELPH-MCNC: 4 G/DL — SIGNIFICANT CHANGE UP (ref 3.5–5.2)
ALP SERPL-CCNC: 106 U/L — SIGNIFICANT CHANGE UP (ref 30–115)
ALT FLD-CCNC: 65 U/L — HIGH (ref 0–41)
ANION GAP SERPL CALC-SCNC: 13 MMOL/L — SIGNIFICANT CHANGE UP (ref 7–14)
AST SERPL-CCNC: 27 U/L — SIGNIFICANT CHANGE UP (ref 0–41)
BASOPHILS # BLD AUTO: 0.03 K/UL — SIGNIFICANT CHANGE UP (ref 0–0.2)
BASOPHILS NFR BLD AUTO: 0.6 % — SIGNIFICANT CHANGE UP (ref 0–1)
BILIRUB SERPL-MCNC: <0.2 MG/DL — SIGNIFICANT CHANGE UP (ref 0.2–1.2)
BUN SERPL-MCNC: 5 MG/DL — LOW (ref 10–20)
CALCIUM SERPL-MCNC: 9.4 MG/DL — SIGNIFICANT CHANGE UP (ref 8.5–10.1)
CALPROTECTIN STL-MCNT: 70 UG/G — SIGNIFICANT CHANGE UP (ref 0–120)
CHLORIDE SERPL-SCNC: 103 MMOL/L — SIGNIFICANT CHANGE UP (ref 98–110)
CO2 SERPL-SCNC: 23 MMOL/L — SIGNIFICANT CHANGE UP (ref 17–32)
CREAT SERPL-MCNC: 0.5 MG/DL — LOW (ref 0.7–1.5)
EOSINOPHIL # BLD AUTO: 0.08 K/UL — SIGNIFICANT CHANGE UP (ref 0–0.7)
EOSINOPHIL NFR BLD AUTO: 1.5 % — SIGNIFICANT CHANGE UP (ref 0–8)
FOLATE SERPL-MCNC: 15.8 NG/ML — SIGNIFICANT CHANGE UP
GLUCOSE BLDC GLUCOMTR-MCNC: 223 MG/DL — HIGH (ref 70–99)
GLUCOSE BLDC GLUCOMTR-MCNC: 245 MG/DL — HIGH (ref 70–99)
GLUCOSE BLDC GLUCOMTR-MCNC: 271 MG/DL — HIGH (ref 70–99)
GLUCOSE BLDC GLUCOMTR-MCNC: 296 MG/DL — HIGH (ref 70–99)
GLUCOSE BLDC GLUCOMTR-MCNC: 345 MG/DL — HIGH (ref 70–99)
GLUCOSE SERPL-MCNC: 137 MG/DL — HIGH (ref 70–99)
HCT VFR BLD CALC: 30.7 % — LOW (ref 37–47)
HGB BLD-MCNC: 9.4 G/DL — LOW (ref 12–16)
IMM GRANULOCYTES NFR BLD AUTO: 0.6 % — HIGH (ref 0.1–0.3)
LYMPHOCYTES # BLD AUTO: 2.59 K/UL — SIGNIFICANT CHANGE UP (ref 1.2–3.4)
LYMPHOCYTES # BLD AUTO: 48.1 % — SIGNIFICANT CHANGE UP (ref 20.5–51.1)
MAGNESIUM SERPL-MCNC: 2.1 MG/DL — SIGNIFICANT CHANGE UP (ref 1.8–2.4)
MCHC RBC-ENTMCNC: 26.1 PG — LOW (ref 27–31)
MCHC RBC-ENTMCNC: 30.6 G/DL — LOW (ref 32–37)
MCV RBC AUTO: 85.3 FL — SIGNIFICANT CHANGE UP (ref 81–99)
MONOCYTES # BLD AUTO: 0.69 K/UL — HIGH (ref 0.1–0.6)
MONOCYTES NFR BLD AUTO: 12.8 % — HIGH (ref 1.7–9.3)
NEUTROPHILS # BLD AUTO: 1.97 K/UL — SIGNIFICANT CHANGE UP (ref 1.4–6.5)
NEUTROPHILS NFR BLD AUTO: 36.4 % — LOW (ref 42.2–75.2)
NRBC # BLD: 0 /100 WBCS — SIGNIFICANT CHANGE UP (ref 0–0)
PLATELET # BLD AUTO: 377 K/UL — SIGNIFICANT CHANGE UP (ref 130–400)
POTASSIUM SERPL-MCNC: 4.5 MMOL/L — SIGNIFICANT CHANGE UP (ref 3.5–5)
POTASSIUM SERPL-SCNC: 4.5 MMOL/L — SIGNIFICANT CHANGE UP (ref 3.5–5)
PROT SERPL-MCNC: 6.6 G/DL — SIGNIFICANT CHANGE UP (ref 6–8)
RBC # BLD: 3.6 M/UL — LOW (ref 4.2–5.4)
RBC # FLD: 14.6 % — HIGH (ref 11.5–14.5)
SODIUM SERPL-SCNC: 139 MMOL/L — SIGNIFICANT CHANGE UP (ref 135–146)
TROPONIN T SERPL-MCNC: <0.01 NG/ML — SIGNIFICANT CHANGE UP
TROPONIN T SERPL-MCNC: <0.01 NG/ML — SIGNIFICANT CHANGE UP
TSH SERPL-MCNC: 1.89 UIU/ML — SIGNIFICANT CHANGE UP (ref 0.27–4.2)
VIT B12 SERPL-MCNC: 707 PG/ML — SIGNIFICANT CHANGE UP (ref 232–1245)
WBC # BLD: 5.39 K/UL — SIGNIFICANT CHANGE UP (ref 4.8–10.8)
WBC # FLD AUTO: 5.39 K/UL — SIGNIFICANT CHANGE UP (ref 4.8–10.8)

## 2022-01-14 PROCEDURE — 99221 1ST HOSP IP/OBS SF/LOW 40: CPT | Mod: GC

## 2022-01-14 PROCEDURE — 99253 IP/OBS CNSLTJ NEW/EST LOW 45: CPT | Mod: GC

## 2022-01-14 PROCEDURE — 99222 1ST HOSP IP/OBS MODERATE 55: CPT

## 2022-01-14 RX ORDER — DEXTROSE 50 % IN WATER 50 %
25 SYRINGE (ML) INTRAVENOUS ONCE
Refills: 0 | Status: DISCONTINUED | OUTPATIENT
Start: 2022-01-14 | End: 2022-01-20

## 2022-01-14 RX ORDER — INSULIN LISPRO 100/ML
10 VIAL (ML) SUBCUTANEOUS
Refills: 0 | Status: DISCONTINUED | OUTPATIENT
Start: 2022-01-14 | End: 2022-01-14

## 2022-01-14 RX ORDER — SODIUM CHLORIDE 9 MG/ML
1000 INJECTION, SOLUTION INTRAVENOUS
Refills: 0 | Status: DISCONTINUED | OUTPATIENT
Start: 2022-01-14 | End: 2022-01-20

## 2022-01-14 RX ORDER — INSULIN GLARGINE 100 [IU]/ML
12 INJECTION, SOLUTION SUBCUTANEOUS AT BEDTIME
Refills: 0 | Status: COMPLETED | OUTPATIENT
Start: 2022-01-14 | End: 2022-01-14

## 2022-01-14 RX ORDER — ACETAMINOPHEN 500 MG
650 TABLET ORAL EVERY 6 HOURS
Refills: 0 | Status: DISCONTINUED | OUTPATIENT
Start: 2022-01-14 | End: 2022-01-20

## 2022-01-14 RX ORDER — INSULIN LISPRO 100/ML
3 VIAL (ML) SUBCUTANEOUS
Refills: 0 | Status: DISCONTINUED | OUTPATIENT
Start: 2022-01-14 | End: 2022-01-14

## 2022-01-14 RX ORDER — INSULIN LISPRO 100/ML
13 VIAL (ML) SUBCUTANEOUS
Refills: 0 | Status: DISCONTINUED | OUTPATIENT
Start: 2022-01-14 | End: 2022-01-15

## 2022-01-14 RX ORDER — CYCLOBENZAPRINE HYDROCHLORIDE 10 MG/1
5 TABLET, FILM COATED ORAL ONCE
Refills: 0 | Status: COMPLETED | OUTPATIENT
Start: 2022-01-14 | End: 2022-01-14

## 2022-01-14 RX ORDER — DEXTROSE 50 % IN WATER 50 %
12.5 SYRINGE (ML) INTRAVENOUS ONCE
Refills: 0 | Status: DISCONTINUED | OUTPATIENT
Start: 2022-01-14 | End: 2022-01-20

## 2022-01-14 RX ORDER — DEXTROSE 50 % IN WATER 50 %
15 SYRINGE (ML) INTRAVENOUS ONCE
Refills: 0 | Status: DISCONTINUED | OUTPATIENT
Start: 2022-01-14 | End: 2022-01-20

## 2022-01-14 RX ORDER — INSULIN GLARGINE 100 [IU]/ML
12 INJECTION, SOLUTION SUBCUTANEOUS AT BEDTIME
Refills: 0 | Status: DISCONTINUED | OUTPATIENT
Start: 2022-01-14 | End: 2022-01-14

## 2022-01-14 RX ORDER — INSULIN GLARGINE 100 [IU]/ML
40 INJECTION, SOLUTION SUBCUTANEOUS EVERY MORNING
Refills: 0 | Status: DISCONTINUED | OUTPATIENT
Start: 2022-01-15 | End: 2022-01-18

## 2022-01-14 RX ORDER — INSULIN GLARGINE 100 [IU]/ML
7 INJECTION, SOLUTION SUBCUTANEOUS AT BEDTIME
Refills: 0 | Status: DISCONTINUED | OUTPATIENT
Start: 2022-01-14 | End: 2022-01-14

## 2022-01-14 RX ORDER — GLUCAGON INJECTION, SOLUTION 0.5 MG/.1ML
1 INJECTION, SOLUTION SUBCUTANEOUS ONCE
Refills: 0 | Status: DISCONTINUED | OUTPATIENT
Start: 2022-01-14 | End: 2022-01-20

## 2022-01-14 RX ORDER — INSULIN LISPRO 100/ML
VIAL (ML) SUBCUTANEOUS
Refills: 0 | Status: DISCONTINUED | OUTPATIENT
Start: 2022-01-14 | End: 2022-01-15

## 2022-01-14 RX ADMIN — INSULIN GLARGINE 12 UNIT(S): 100 INJECTION, SOLUTION SUBCUTANEOUS at 22:01

## 2022-01-14 RX ADMIN — ENOXAPARIN SODIUM 40 MILLIGRAM(S): 100 INJECTION SUBCUTANEOUS at 11:42

## 2022-01-14 RX ADMIN — Medication 2: at 08:15

## 2022-01-14 RX ADMIN — FAMOTIDINE 40 MILLIGRAM(S): 10 INJECTION INTRAVENOUS at 05:20

## 2022-01-14 RX ADMIN — Medication 81 MILLIGRAM(S): at 11:41

## 2022-01-14 RX ADMIN — Medication 250 MILLIGRAM(S): at 17:27

## 2022-01-14 RX ADMIN — CYCLOBENZAPRINE HYDROCHLORIDE 5 MILLIGRAM(S): 10 TABLET, FILM COATED ORAL at 22:00

## 2022-01-14 RX ADMIN — Medication 25 MILLIGRAM(S): at 14:09

## 2022-01-14 RX ADMIN — ESCITALOPRAM OXALATE 5 MILLIGRAM(S): 10 TABLET, FILM COATED ORAL at 16:17

## 2022-01-14 RX ADMIN — POLYETHYLENE GLYCOL 3350 17 GRAM(S): 17 POWDER, FOR SOLUTION ORAL at 11:42

## 2022-01-14 RX ADMIN — Medication 25 MILLIGRAM(S): at 22:01

## 2022-01-14 RX ADMIN — FAMOTIDINE 40 MILLIGRAM(S): 10 INJECTION INTRAVENOUS at 17:27

## 2022-01-14 RX ADMIN — Medication 250 MILLIGRAM(S): at 05:20

## 2022-01-14 RX ADMIN — CYCLOBENZAPRINE HYDROCHLORIDE 10 MILLIGRAM(S): 10 TABLET, FILM COATED ORAL at 03:50

## 2022-01-14 RX ADMIN — Medication 75 MILLIGRAM(S): at 22:01

## 2022-01-14 RX ADMIN — Medication 650 MILLIGRAM(S): at 14:45

## 2022-01-14 RX ADMIN — Medication 4: at 17:31

## 2022-01-14 RX ADMIN — Medication 1 APPLICATION(S): at 17:28

## 2022-01-14 RX ADMIN — Medication 1 TABLET(S): at 11:42

## 2022-01-14 RX ADMIN — IVABRADINE 5 MILLIGRAM(S): 7.5 TABLET, FILM COATED ORAL at 05:21

## 2022-01-14 RX ADMIN — IVABRADINE 5 MILLIGRAM(S): 7.5 TABLET, FILM COATED ORAL at 18:36

## 2022-01-14 RX ADMIN — Medication 650 MILLIGRAM(S): at 15:30

## 2022-01-14 RX ADMIN — Medication 3 UNIT(S): at 08:15

## 2022-01-14 RX ADMIN — Medication 25 MILLIGRAM(S): at 05:20

## 2022-01-14 RX ADMIN — Medication 13 UNIT(S): at 17:24

## 2022-01-14 RX ADMIN — Medication 1 APPLICATION(S): at 05:20

## 2022-01-14 NOTE — BEHAVIORAL HEALTH ASSESSMENT NOTE - NSBHCHARTREVIEWVS_PSY_A_CORE FT
Vital Signs Last 24 Hrs  T(C): 36.3 (14 Jan 2022 12:58), Max: 37 (13 Jan 2022 21:59)  T(F): 97.4 (14 Jan 2022 12:58), Max: 98.6 (13 Jan 2022 21:59)  HR: 91 (14 Jan 2022 12:58) (90 - 114)  BP: 117/56 (14 Jan 2022 12:58) (117/56 - 132/65)  BP(mean): --  RR: 18 (14 Jan 2022 12:58) (18 - 18)  SpO2: 99% (13 Jan 2022 21:59) (99% - 99%)

## 2022-01-14 NOTE — CONSULT NOTE ADULT - CONSULT REQUESTED BY NAME
Discussed message below with Lexi, Dr. Verdugo's nurse.  Lexi reports that patient was referred to Dr. Verdugo as he needed sedation for the injection.  Lexi sent an update to Dr. Cochran's staff to make them aware of the denial as they were the ordering provider of the injection and insurance company is requiring more documentation.  Explained that writer will call patient and update him to contact Dr. Cochran's office.     Patient contacted and updated call from Lexi.  Patient verbalized understanding and will reach out to Dr. Cochran's office.    
ED team
Patient contacted regarding message below.  Explained to patient that writer has contacted Dr. Verdugo's nurse to discuss denial for more information and am waiting on a response back.  Patient verbalized understanding and thanked writer for working on it.    
Pt is scheduled for injection 7/1/2021. Pt states insurance denied injection and he was told to reach out and see what the office can do so he can still get injection 7/1/2021. Thank You  
ED Team
medicine

## 2022-01-14 NOTE — BEHAVIORAL HEALTH ASSESSMENT NOTE - SUMMARY
36F who is domiciled at [facility] with parents, unemployed, single, with PMHx of T1DM, Diabetic neuropathy, HTN, chronic back pain, migraine headaches, PPHx of anxiety/depression, 3 IPP admissions for suicide attempts as a teenager (once by taking a capful of acetone, another by drinking hydrogen peroxide, another attempt by taking half a bottle of ibuprofen or acetaminophen), was re-admitted for syncopal episode the day after her discharge for an admission for COVID and DKA. Psychiatry was consulted for a mental health evaluation.     Pt's medical condition appears to still be resolving post-COVID infection and near 3-week long hospitalization. Her syncopal episode may be secondary to dehydration and does not appear to be associated with psychiatric symptoms. She endorse mild anxiety regarding her ongoing medical investigations and unknown etiology for multiple syncopal episodes, but feels that her sxs are managed on the lexapro 5 mg PO qdaily. She is amenable to following up with her psychiatrist OP at [address] [place] [id number].

## 2022-01-14 NOTE — CHART NOTE - NSCHARTNOTEFT_GEN_A_CORE
The patient is a 37 y/o female with PMHx of T1DM, Diabetic neuropathy, HTN, chronic back pain, migraine headaches admitted for syncopal episodes. Psychiatry was consulted for mental status examination. She has a past psychiatric history of anxiety, unspecified bipolar disorder, and 3 IPP admissions for suicide attempts as a teenager. She reports multiple episodes of dizziness, diaphoresis, and feelings of severe anxiety which come on in seconds and occur almost every day for the last two months    - Social history: The patient has been living at [facility] for 1 year with her two parents after her family was evicted from their apartment and she alternated living with her aunt and in her car for a span of a few months. She is single and currently unemployed, and receives financial assistance from her parents. 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 She spends most of her time with her parents and reports that she does not have many peer relationships as many of her friends had kids and moved away, though she has one close friend who she met online years ago who lives in [place]. She has attempted to socialize and make friends with the other residents at [facility] but notes that the age gap has made it difficult. She reports being adopted and does not know anything about her family history nor has she ever met any biological family members. Her highest level education was graduating high school. She denies any substance use currently or in the past. She is not Sabianism.

## 2022-01-14 NOTE — CONSULT NOTE ADULT - SUBJECTIVE AND OBJECTIVE BOX
HPI:  36-year-old, F, patient, PMHx: Type I DM ( since the age of 16 months) on an insulin pump complicated by Diabetic neuropathy , HTN, chronic back pain, migraine headaches, recently admitted to the hospital fo DKA and COVID, discharged yesterday presents to the hospital due to syncope.   Patient reports syncopal episode twice today, which was witnessed by her mother. Pt was at the assisted living when she collapsed, and her mother caught her; second time patient was in bathroom getting dressed and felt lightheaded and weak, and passed out. She reported having continuous chest pain, SOB and palpitations before the event.   + dizzy, weakness, and nausea. Denies head injury, no seizure like activity, no urinary or bowel incontinence, no tongue biting.   Pt dad 3 similar syncopal episodes during her last admission, has also been having retrosternal chest pain for past 3 weeks during her past hospitalization.  Reports lower abdominal pain for past 3 weeks, not sexually active.   Denies fever, chills, headache, SOB, palpitations, urinary symptoms, leg pain, leg swelling, and pain elsewhere.    Labs significant for glucose 262. Hgb 10.6, WBC 4.5. Na 132. UA negative. Trop negative.   EKG QTc 492.  CXR negative for cardiopulmonary disease. CTA negative for Pulm embolism.  Pt was given 2g Mag and 2L of LR bolus in ED.           PAST MEDICAL & SURGICAL HISTORY:  Neuropathy  right lower extremity, vaginal    Type 1 diabetes    Degenerative disc disease, thoracic    Urinary tract infection, recurrent    Gastroesophageal reflux disease, esophagitis presence not specified    Intractable headache, unspecified chronicity pattern, unspecified headache type    Major depressive disorder with single episode, in full remission  previously treated with zyprexa, celexa and lexapro    Tremor of right hand    Tachycardia    Spinal stenosis    No significant past surgical history        Hospital Course:    TODAY'S SUBJECTIVE & REVIEW OF SYMPTOMS:     Constitutional WNL   Cardio WNL   Resp WNL   GI WNL  Heme WNL  Endo WNL  Skin WNL  MSK Weakness  Neuro WNL  Cognitive syncope  Psych WNL      MEDICATIONS  (STANDING):  amitriptyline Oral Tab/Cap - Peds 75 milliGRAM(s) Oral at bedtime  ammonium lactate 12% Lotion 1 Application(s) Topical two times a day  aspirin  chewable 81 milliGRAM(s) Oral daily  chlorhexidine 4% Liquid 1 Application(s) Topical <User Schedule>  dextrose 40% Gel 15 Gram(s) Oral once  dextrose 5%. 1000 milliLiter(s) (50 mL/Hr) IV Continuous <Continuous>  dextrose 5%. 1000 milliLiter(s) (100 mL/Hr) IV Continuous <Continuous>  dextrose 50% Injectable 25 Gram(s) IV Push once  dextrose 50% Injectable 12.5 Gram(s) IV Push once  dextrose 50% Injectable 25 Gram(s) IV Push once  enoxaparin Injectable 40 milliGRAM(s) SubCutaneous daily  escitalopram 5 milliGRAM(s) Oral daily  famotidine    Tablet 40 milliGRAM(s) Oral two times a day  glucagon  Injectable 1 milliGRAM(s) IntraMuscular once  insulin glargine Injectable (LANTUS) 7 Unit(s) SubCutaneous at bedtime  insulin lispro (ADMELOG) corrective regimen sliding scale   SubCutaneous three times a day before meals  insulin lispro Injectable (ADMELOG) 3 Unit(s) SubCutaneous three times a day before meals  ivabradine 5 milliGRAM(s) Oral two times a day  metoprolol tartrate 25 milliGRAM(s) Oral every 8 hours  multivitamin Oral Tab/Cap - Peds 1 Tablet(s) Oral daily  naproxen 250 milliGRAM(s) Oral two times a day  polyethylene glycol 3350 17 Gram(s) Oral daily  sodium chloride 0.9%. 1000 milliLiter(s) (75 mL/Hr) IV Continuous <Continuous>    MEDICATIONS  (PRN):  guaifenesin/dextromethorphan Oral Liquid 10 milliLiter(s) Oral every 4 hours PRN Cough      FAMILY HISTORY:      Allergies    amoxicillin (Unknown)  Ceclor (Rash)  ciprofloxacin (Unknown)  clindamycin (Unknown)  flu vaccines (Other)  gabapentin (Unknown)  Influenza Virus Vaccine, H5N1, Inactivated (Unknown)  penicillins (Unknown)    Intolerances        SOCIAL HISTORY:    [  ] Etoh  [  ] Smoking  [  ] Substance abuse     Home Environment:  [   ] Home Alone  [   ] Lives with Family  [   ] Home Health Aid    Dwelling:  [   ] Apartment  [   ] Private House  [   ] Adult Home  [ a  ] Skilled Nursing Facility      [   ] Short Term  [   ] Long Term  [   ] Stairs       Elevator [   ]    FUNCTIONAL STATUS PTA: (Check all that apply)  Ambulation: [  x  ]Independent    [   ] Dependent     [   ] Non-Ambulatory  Assistive Device: [   ] SA Cane  [   ]  Q Cane  [   ] Walker  [   ]  Wheelchair  ADL : [   x] Independent  [    ]  Dependent       Vital Signs Last 24 Hrs  T(C): 36.3 (2022 04:19), Max: 37.2 (2022 15:30)  T(F): 97.4 (2022 04:19), Max: 98.9 (2022 15:30)  HR: 90 (2022 04:19) (90 - 114)  BP: 122/58 (2022 04:19) (122/58 - 139/83)  BP(mean): --  RR: 18 (2022 04:19) (18 - 18)  SpO2: 99% (2022 21:59) (99% - 100%)      Physical Exam: referenced - ED H&P on 22  GENERAL: NAD, AAO x 4, 36y F  HEAD:  Atraumatic, Normocephalic  EYES: EOMI, conjunctiva and sclera white  NECK: Supple, No JVD  CHEST/LUNG: Clear to auscultation bilaterally; No wheeze; No crackles; No accessory muscles used  HEART: Regular rate and rhythm; No murmurs;   ABDOMEN: Soft, Nontender, Nondistended; Bowel sounds present; No guarding, No organomegaly  EXTREMITIES:  2+ Peripheral Pulses, No cyanosis or edema  NEUROLOGY: non-focal      LABS:                        9.4    5.39  )-----------( 377      ( 2022 04:30 )             30.7     14    139  |  103  |  5<L>  ----------------------------<  137<H>  4.5   |  23  |  0.5<L>    Ca    9.4      2022 04:30  Mg     2.1         TPro  6.6  /  Alb  4.0  /  TBili  <0.2  /  DBili  x   /  AST  27  /  ALT  65<H>  /  AlkPhos  106  14      Urinalysis Basic - ( 2022 12:55 )    Color: Yellow / Appearance: Clear / S.024 / pH: x  Gluc: x / Ketone: Negative  / Bili: Negative / Urobili: <2 mg/dL   Blood: x / Protein: Trace / Nitrite: Negative   Leuk Esterase: Negative / RBC: x / WBC x   Sq Epi: x / Non Sq Epi: x / Bacteria: x        RADIOLOGY & ADDITIONAL STUDIES:

## 2022-01-14 NOTE — BEHAVIORAL HEALTH ASSESSMENT NOTE - PAST PSYCHOTROPIC MEDICATION
lexapro, buspar, zyprexa, celexa (latter 2 stopped over 18 years ago reportedly; zyprexa reportedly gave her galactorrhea.)

## 2022-01-14 NOTE — BEHAVIORAL HEALTH ASSESSMENT NOTE - DETAILS
Patient has a remote hx of 3 suicide attempts in her teens after 9/11. Hx of multiple recent familial deaths that have been very distressing to the pt.

## 2022-01-14 NOTE — BEHAVIORAL HEALTH ASSESSMENT NOTE - COMMENTS ON SUICIDE RISK/PROTECTIVE FACTORS:
Remote hx of suicide attempts, however pt has not felt suicidal in years and has many supports in place to make her feel safe at her current residence.

## 2022-01-14 NOTE — BEHAVIORAL HEALTH ASSESSMENT NOTE - RISK ASSESSMENT
Risk factors: hx of suicide attempts, hx of mood disorder, acute medical issues, not engaged in school or work    Protective: care seeking behaviors, not suicidal Low Acute Suicide Risk

## 2022-01-14 NOTE — BEHAVIORAL HEALTH ASSESSMENT NOTE - SUICIDE PROTECTIVE FACTORS
Responsibility to family and others/Identifies reasons for living/Supportive social network of family or friends/Positive therapeutic relationships/Frustration tolerance

## 2022-01-14 NOTE — PATIENT PROFILE ADULT - FALL HARM RISK - UNIVERSAL INTERVENTIONS
Bed in lowest position, wheels locked, appropriate side rails in place/Call bell, personal items and telephone in reach/Instruct patient to call for assistance before getting out of bed or chair/Non-slip footwear when patient is out of bed/[place] to call system/Physically safe environment - no spills, clutter or unnecessary equipment/Purposeful Proactive Rounding/Room/bathroom lighting operational, light cord in reach

## 2022-01-14 NOTE — BEHAVIORAL HEALTH ASSESSMENT NOTE - CASE SUMMARY
Ms Mei is a 36 year old woman with a history of Depression and anxiety who was admitted to the medical floor for the evaluation of Syncope . Patient was discharged yesterday following a medical admission for COVID pneumonia and Diabetes Keto acidosis.    Psychiatry consult was called for a mental health evaluation.  The causer of patient's syncopal episode is unclear. A medical cause can not be completely ruled out given her recent medical hospitalization and her multiple medical problems. However, there is a possibility that patient's syncope could be related to anxiety .   For now, patient does not appear to have acute symptoms of psychosis, alvina or depression. She denies having current suicidal ideations, intent or plan.  At this time, patient is not considered an imminent danger to himself or others and does not need inpatient psychiatric hospitalization. Patient can continue her current dose of Lexapro with the plan to follow up with her new outpatient psychiatrist Dr Gallegos at  [facility], [address] [place] [address], and Phone number: [phone]

## 2022-01-14 NOTE — BEHAVIORAL HEALTH ASSESSMENT NOTE - NSBHCHARTREVIEWINVESTIGATE_PSY_A_CORE FT
< from: 12 Lead ECG (01.13.22 @ 12:13) >      Ventricular Rate 98 BPM    Atrial Rate 98 BPM    P-R Interval 110 ms    QRS Duration 90 ms    Q-T Interval 386 ms    QTC Calculation(Bazett) 492 ms    P Axis 45 degrees    R Axis 42 degrees    T Axis 40 degrees    Diagnosis Line Sinus rhythm with short MS  Prolonged QT  Abnormal ECG    Confirmed by Umer Robertson ([id number]) on 1/13/2022 12:42:34 PM    < end of copied text >

## 2022-01-14 NOTE — BEHAVIORAL HEALTH ASSESSMENT NOTE - HPI (INCLUDE ILLNESS QUALITY, SEVERITY, DURATION, TIMING, CONTEXT, MODIFYING FACTORS, ASSOCIATED SIGNS AND SYMPTOMS)
36F who is domiciled at [facility] with parents, unemployed, single, with PMHx of T1DM, Diabetic neuropathy, HTN, chronic back pain, migraine headaches, PPHx of anxiety/depression, 3 [facility] admissions for suicide attempts as a teenager (once by taking a capful of acetone, another by drinking hydrogen peroxide, another attempt by taking half a bottle of ibuprofen or acetaminophen), was re-admitted for syncopal episode the day after her discharge for an admission for COVID and DKA. Psychiatry was consulted for a mental health evaluation.     Upon approach the pt was lying calmly in bed, alert to voice and oriented x3. Speech, behavior, thought process and content were unremarkable. She states that she had fainted 3 times during her last admission for COVID and DKA, and after she was discharged on 1/12/22, she fainted twice at [facility]. She stated that she felt she had not fully recovered and her mother called 911 to revisit the hospital. She denied any trigger for her fainting episode, but stated that upon waking that morning she endorsed a headache, lightheadedness, sweating and intense nausea. She stated that she might have been dehydrated, but endorsed good food intake and a stable blood glucose level of approximately 200 at the time of fainting.     Currently, she states that she is mildly anxious due to concern for her current symptoms and ongoing medical investigations, such as several CT scans. She states that her anxiety can also be triggered by the recent passings of residents at [facility]. She currently denies any feelings of depression or alvina. She endorsed adequate sleep and appetite. She denies any desire to end her life or harm herself or others. She denies any auditory or visual hallucinations, paranoia, or delusions.     Pt endorses hx of bipolar disorder since her teen years. Reports a hx of taking zyprexa, celexa, lexapro and buspar in the past. Also takes Amitriptyline for migraines, which was briefly switched to buspar and melatonin by a psychiatrist at [facility], Dr. Worley, but was switched back after 2 weeks due to "feeling horrible on them." She also endorses following with a psychotherapist named Dr. Pawan Alves occasionally (every few weeks) when she requires it. PT states that lexapro 5 mg qdaily which was restarted for her during a previous psychiatric evaluation at [facility] on 12/26/21 has been helpful for anxiety. She stated in the past her Lexapro had been stopped once she turned 18 and could not continue with [facility] for psychiatric care. Patient endorses hx of 3 suicide attempts during teen years, by overdosing on various medications and one time with hydrogen peroxide, after which the patient was reportedly admitted to [facility] following each suicide attempt. She states this all occurred after losing a cousin in 9/11 and that she was diagnosed with bipolar disorder at age 18. Notably, she has completed her intake with [facility] at [address] [place] [address]. She is scheduled for her first appointment with the psychiatrist on 1/24/22 and is agreeable to follow up.

## 2022-01-14 NOTE — CHART NOTE - NSCHARTNOTEFT_GEN_A_CORE
Pt seen and examiend at bedside. Insulin adjusted based on endocrine recs from last week. Case d/w ep, awaiting ccta and cardio eval. c/w telemetry.

## 2022-01-14 NOTE — CONSULT NOTE ADULT - ASSESSMENT
IMPRESSION: Rehab of gait dysfunction / s/p fall, covid-19 +    PRECAUTIONS: [   ] Cardiac  [  x ] Respiratory  [   ] Seizures [  x ] Contact Isolation  [ x  ] Droplet Isolation  [   ] Other    Weight Bearing Status:     RECOMMENDATION:    Out of Bed to Chair     DVT/Decubiti Prophylaxis    REHAB PLAN:     [ x   ] Bedside P/T 3-5 times a week   [    ]   Bedside O/T  2-3 times a week             [    ] Speech Therapy               [    ]  No Rehab Therapy Indicated   Conditioning/ROM                                    ADL  Bed Mobility                                               Conditioning/ROM  Transfers                                                     Bed Mobility  Sitting /Standing Balance                         Transfers                                        Gait Training                                               Sitting/Standing Balance  Stair Training [   ]Applicable                    Home equipment Eval                                                                        Splinting  [   ] Only      GOALS:   ADL   [    ]   Independent                    Transfers  [  x  ] Independent                          Ambulation  [x    ] Independent     [x     ] With device                            [    ]  CG                                                         [    ]  CG                                                                  [    ] CG                            [    ] Min A                                                   [    ] Min A                                                              [    ] Min  A          DISCHARGE PLAN:   [    ]  Good candidate for Intensive Rehabilitation/Hospital based                                             Will tolerate 3hrs Intensive Rehab Daily                                       [ x    ]  Short Term Rehab in Skilled Nursing Facility / assisted living facility                                        [     ]  Home with Outpatient or  services                                         [     ]  Possible Candidate for Intensive Hospital based Rehab

## 2022-01-14 NOTE — PROGRESS NOTE ADULT - SUBJECTIVE AND OBJECTIVE BOX
CRISTI MONTGOMERY 36y Female  MRN#: [id number]   CODE STATUS: FULL     Hospital Day: 1d    Pt is currently admitted with the primary diagnosis of     SUBJECTIVE  Hospital Course  36-year-old, F, patient, PMHx: Type I DM ( since the age of 16 months) on an insulin pump complicated by Diabetic neuropathy , HTN, chronic back pain, migraine headaches, recently admitted to the hospital fo DKA and COVID, discharged yesterday presents to the hospital due to syncope.   Patient reports syncopal episode twice today, which was witnessed by her mother. Pt was at the assisted living when she collapsed, and her mother caught her; second time patient was in bathroom getting dressed and felt lightheaded and weak, and passed out. She reported having continuous chest pain, SOB and palpitations before the event.   + dizzy, weakness, and nausea. Denies head injury, no seizure like activity, no urinary or bowel incontinence, no tongue biting.   Pt dad 3 similar syncopal episodes during her last admission, has also been having retrosternal chest pain for past 3 weeks during her past hospitalization.  Reports lower abdominal pain for past 3 weeks, not sexually active.   Denies fever, chills, headache, SOB, palpitations, urinary symptoms, leg pain, leg swelling, and pain elsewhere.    Labs significant for glucose 262. Hgb 10.6, WBC 4.5. Na 132. UA negative. Trop negative.   EKG QTc 492.  CXR negative for cardiopulmonary disease. CTA negative for Pulm embolism.  Pt was given 2g Mag and 2L of LR bolus in ED.     Overnight events: No events overnight     Subjective complaints     Present Today:   - Freitas:  No [  ], Yes [   ] : Indication:     - Type of IV Access:       .. CVC/Piccline:  No [  ], Yes [   ] : Indication:       .. Midline: No [  ], Yes [   ] : Indication:                                             ----------------------------------------------------------  OBJECTIVE  PAST MEDICAL & SURGICAL HISTORY  Neuropathy  right lower extremity, vaginal    Type 1 diabetes    Degenerative disc disease, thoracic    Urinary tract infection, recurrent    Gastroesophageal reflux disease, esophagitis presence not specified    Intractable headache, unspecified chronicity pattern, unspecified headache type    Major depressive disorder with single episode, in full remission  previously treated with zyprexa, celexa and lexapro    Tremor of right hand    Tachycardia    Spinal stenosis    No significant past surgical history                                              -----------------------------------------------------------  ALLERGIES:  amoxicillin (Unknown)  Ceclor (Rash)  ciprofloxacin (Unknown)  clindamycin (Unknown)  flu vaccines (Other)  gabapentin (Unknown)  Influenza Virus Vaccine, H5N1, Inactivated (Unknown)  penicillins (Unknown)                                            ------------------------------------------------------------    HOME MEDICATIONS  Home Medications:  amitriptyline 75 mg oral tablet: 1 tab(s) orally once a day (at bedtime) (2021 15:06)  ammonium lactate 12% topical cream: Apply topically to affected area 2 times a day (23 Dec 2021 13:53)  aspirin 81 mg oral tablet, chewable: 1 tab(s) orally once a day (28 Dec 2021 09:29)  cyclobenzaprine 10 mg oral tablet: 1 tab(s) orally once a day (at bedtime) (28 Dec 2021 08:33)  famotidine 40 mg oral tablet: 1 tab(s) orally 2 times a day (2022 14:26)  metoprolol tartrate 25 mg oral tablet: 1 tab(s) orally 2 times a day (2021 15:06)  multivitamin: 1 tab(s) orally once a day (2021 15:06)                           MEDICATIONS:  STANDING MEDICATIONS  amitriptyline Oral Tab/Cap - Peds 75 milliGRAM(s) Oral at bedtime  ammonium lactate 12% Lotion 1 Application(s) Topical two times a day  aspirin  chewable 81 milliGRAM(s) Oral daily  chlorhexidine 4% Liquid 1 Application(s) Topical <User Schedule>  dextrose 40% Gel 15 Gram(s) Oral once  dextrose 5%. 1000 milliLiter(s) IV Continuous <Continuous>  dextrose 5%. 1000 milliLiter(s) IV Continuous <Continuous>  dextrose 50% Injectable 25 Gram(s) IV Push once  dextrose 50% Injectable 12.5 Gram(s) IV Push once  dextrose 50% Injectable 25 Gram(s) IV Push once  enoxaparin Injectable 40 milliGRAM(s) SubCutaneous daily  escitalopram 5 milliGRAM(s) Oral daily  famotidine    Tablet 40 milliGRAM(s) Oral two times a day  glucagon  Injectable 1 milliGRAM(s) IntraMuscular once  insulin glargine Injectable (LANTUS) 12 Unit(s) SubCutaneous at bedtime  insulin lispro (ADMELOG) corrective regimen sliding scale   SubCutaneous three times a day before meals  insulin lispro Injectable (ADMELOG) 10 Unit(s) SubCutaneous three times a day before meals  ivabradine 5 milliGRAM(s) Oral two times a day  metoprolol tartrate 25 milliGRAM(s) Oral every 8 hours  multivitamin Oral Tab/Cap - Peds 1 Tablet(s) Oral daily  naproxen 250 milliGRAM(s) Oral two times a day  polyethylene glycol 3350 17 Gram(s) Oral daily  sodium chloride 0.9%. 1000 milliLiter(s) IV Continuous <Continuous>    PRN MEDICATIONS  acetaminophen     Tablet .. 650 milliGRAM(s) Oral every 6 hours PRN  guaifenesin/dextromethorphan Oral Liquid 10 milliLiter(s) Oral every 4 hours PRN                                            ------------------------------------------------------------  VITAL SIGNS: Last 24 Hours  T(C): 36.3 (2022 12:58), Max: 37.2 (2022 15:30)  T(F): 97.4 (2022 12:58), Max: 98.9 (2022 15:30)  HR: 91 (2022 12:58) (90 - 114)  BP: 117/56 (2022 12:58) (117/56 - 139/83)  BP(mean): --  RR: 18 (2022 12:58) (18 - 18)  SpO2: 99% (2022 21:59) (99% - 100%)      22 @ 07:01  -  22 @ 07:00  --------------------------------------------------------  IN: 435 mL / OUT: 300 mL / NET: 135 mL    22 @ 07:  -  22 @ 14:53  --------------------------------------------------------  IN: 330 mL / OUT: 0 mL / NET: 330 mL                                             --------------------------------------------------------------  LABS:                        9.4    5.39  )-----------( 377      ( 2022 04:30 )             30.7         139  |  103  |  5<L>  ----------------------------<  137<H>  4.5   |  23  |  0.5<L>    Ca    9.4      2022 04:30  Mg     2.1         TPro  6.6  /  Alb  4.0  /  TBili  <0.2  /  DBili  x   /  AST  27  /  ALT  65<H>  /  AlkPhos  106        Urinalysis Basic - ( 2022 12:55 )    Color: Yellow / Appearance: Clear / S.024 / pH: x  Gluc: x / Ketone: Negative  / Bili: Negative / Urobili: <2 mg/dL   Blood: x / Protein: Trace / Nitrite: Negative   Leuk Esterase: Negative / RBC: x / WBC x   Sq Epi: x / Non Sq Epi: x / Bacteria: x        Troponin T, Serum: <0.01 ng/mL (22 @ 04:30)  Troponin T, Serum: <0.01 ng/mL (22 @ 23:30)  Troponin T, Serum: <0.01 ng/mL (22 @ 15:26)          CARDIAC MARKERS ( 2022 04:30 )  x     / <0.01 ng/mL / x     / x     / x      CARDIAC MARKERS ( 2022 23:30 )  x     / <0.01 ng/mL / x     / x     / x      CARDIAC MARKERS ( 2022 15:26 )  x     / <0.01 ng/mL / x     / x     / x      CARDIAC MARKERS ( 2022 11:47 )  x     / <0.01 ng/mL / x     / x     / x                                                  -------------------------------------------------------------  RADIOLOGY:                                            --------------------------------------------------------------    PHYSICAL EXAM:                                             --------------------------------------------------------------    ASSESSMENT & PLAN   36-year-old, F, patient, PMHx: Type I DM ( since the age of 16 months) on an insulin pump complicated by Diabetic neuropathy , HTN, chronic back pain, migraine headaches, recently admitted to the hospital fo DKA and COVID, discharged yesterday, presents to the hospital due to syncope.    # fainting x2, r/o syncope  # r/o Munchausen's syndrome, factitious disorder. Pt with multiple somatic complaints  - Tele admit  - trops neg x 2, f/u cardiac enzymes  - orthostatic VS negative in ED  - ECG (22 @ 12:13): Line Sinus rhythm with short NC; Prolonged QT  - f/u EKG in AM  - EP consult appreciated.  - s/p 2L LR bolus in ED, c/w IV hydration  - Psychiatry and Neurology cs  - obtain records from patient's cardiologist (Dr. Christian), check results of stress echo and holter monitor( pt reports it was done in 2021)  - Start corlanor 5mg po bid  - Increase metoprolol to 25mg q8hrs  - CCTA  - 30-day cardiac monitor (MCOT) prior to discharg- f/u TSH, folate/B12   - Fall precautions.     # Hx of COVID-19 PNA, resolved  - c/w Robitussin 10mL Q4 PRN Cough    # Abdominal Pain,  # mild LFTs elevation  - CT Abdomen and Pelvis w/ IV Cont 21:  No acute intra-abdominal or pelvic pathology is identified  - RUQ US no significant findings  - monitor LFTs  - follow up outpatient with GI For MRI of liver lesions and colonoscopy to r/o IBD    #  Type I DM   # Diabetic neuropathy  - pt is on insulin pump ( insulin supply will last for 3 days in the pump. May need to switch to subQ when insulin running out from the pump, or get non-formulary from pharmacy)    # Anxiety Disorder   - c/w lexapro 5 mg and amitriptyline 75mg HS    Diet: CC  Activity: as tolerated  DVT Prophylaxis: lovenox subQ  GI Prophylaxis: famotidine  CHG Order  Code Status: full code  Disposition: telemetry         CRISTI MONTGOMERY 36y Female  MRN#: [id number]   CODE STATUS: FULL     Hospital Day: 1d    Pt is currently admitted with the primary diagnosis of     SUBJECTIVE  Hospital Course  36-year-old, F, patient, PMHx: Type I DM ( since the age of 16 months) on an insulin pump complicated by Diabetic neuropathy , HTN, chronic back pain, migraine headaches, recently admitted to the hospital fo DKA and COVID, discharged yesterday presents to the hospital due to syncope.   Patient reports syncopal episode twice today, which was witnessed by her mother. Pt was at the assisted living when she collapsed, and her mother caught her; second time patient was in bathroom getting dressed and felt lightheaded and weak, and passed out. She reported having continuous chest pain, SOB and palpitations before the event.   + dizzy, weakness, and nausea. Denies head injury, no seizure like activity, no urinary or bowel incontinence, no tongue biting.   Pt dad 3 similar syncopal episodes during her last admission, has also been having retrosternal chest pain for past 3 weeks during her past hospitalization.  Reports lower abdominal pain for past 3 weeks, not sexually active.   Denies fever, chills, headache, SOB, palpitations, urinary symptoms, leg pain, leg swelling, and pain elsewhere.    Labs significant for glucose 262. Hgb 10.6, WBC 4.5. Na 132. UA negative. Trop negative.   EKG QTc 492.  CXR negative for cardiopulmonary disease. CTA negative for Pulm embolism.  Pt was given 2g Mag and 2L of LR bolus in ED.     Overnight events: No events overnight, no events on tele     Subjective complaints: She states that chest pain is pressure like, has palpitations and has been occurring for years. Abdominal pain has also been occurring on and off for years. She is in assisted living with her parents. Family was evicted from apartment, was living temporarily with family and in there car. Now in assisted living.     Present Today:   - Freitas:  No [  ], Yes [   ] : Indication:     - Type of IV Access:       .. CVC/Piccline:  No [  ], Yes [   ] : Indication:       .. Midline: No [  ], Yes [   ] : Indication:                                             ----------------------------------------------------------  OBJECTIVE  PAST MEDICAL & SURGICAL HISTORY  Neuropathy  right lower extremity, vaginal    Type 1 diabetes    Degenerative disc disease, thoracic    Urinary tract infection, recurrent    Gastroesophageal reflux disease, esophagitis presence not specified    Intractable headache, unspecified chronicity pattern, unspecified headache type    Major depressive disorder with single episode, in full remission  previously treated with zyprexa, celexa and lexapro    Tremor of right hand    Tachycardia    Spinal stenosis    No significant past surgical history                                              -----------------------------------------------------------  ALLERGIES:  amoxicillin (Unknown)  Ceclor (Rash)  ciprofloxacin (Unknown)  clindamycin (Unknown)  flu vaccines (Other)  gabapentin (Unknown)  Influenza Virus Vaccine, H5N1, Inactivated (Unknown)  penicillins (Unknown)                                            ------------------------------------------------------------    HOME MEDICATIONS  Home Medications:  amitriptyline 75 mg oral tablet: 1 tab(s) orally once a day (at bedtime) (2021 15:06)  ammonium lactate 12% topical cream: Apply topically to affected area 2 times a day (23 Dec 2021 13:53)  aspirin 81 mg oral tablet, chewable: 1 tab(s) orally once a day (28 Dec 2021 09:29)  cyclobenzaprine 10 mg oral tablet: 1 tab(s) orally once a day (at bedtime) (28 Dec 2021 08:33)  famotidine 40 mg oral tablet: 1 tab(s) orally 2 times a day (2022 14:26)  metoprolol tartrate 25 mg oral tablet: 1 tab(s) orally 2 times a day (2021 15:06)  multivitamin: 1 tab(s) orally once a day (2021 15:06)                           MEDICATIONS:  STANDING MEDICATIONS  amitriptyline Oral Tab/Cap - Peds 75 milliGRAM(s) Oral at bedtime  ammonium lactate 12% Lotion 1 Application(s) Topical two times a day  aspirin  chewable 81 milliGRAM(s) Oral daily  chlorhexidine 4% Liquid 1 Application(s) Topical <User Schedule>  dextrose 40% Gel 15 Gram(s) Oral once  dextrose 5%. 1000 milliLiter(s) IV Continuous <Continuous>  dextrose 5%. 1000 milliLiter(s) IV Continuous <Continuous>  dextrose 50% Injectable 25 Gram(s) IV Push once  dextrose 50% Injectable 12.5 Gram(s) IV Push once  dextrose 50% Injectable 25 Gram(s) IV Push once  enoxaparin Injectable 40 milliGRAM(s) SubCutaneous daily  escitalopram 5 milliGRAM(s) Oral daily  famotidine    Tablet 40 milliGRAM(s) Oral two times a day  glucagon  Injectable 1 milliGRAM(s) IntraMuscular once  insulin glargine Injectable (LANTUS) 12 Unit(s) SubCutaneous at bedtime  insulin lispro (ADMELOG) corrective regimen sliding scale   SubCutaneous three times a day before meals  insulin lispro Injectable (ADMELOG) 10 Unit(s) SubCutaneous three times a day before meals  ivabradine 5 milliGRAM(s) Oral two times a day  metoprolol tartrate 25 milliGRAM(s) Oral every 8 hours  multivitamin Oral Tab/Cap - Peds 1 Tablet(s) Oral daily  naproxen 250 milliGRAM(s) Oral two times a day  polyethylene glycol 3350 17 Gram(s) Oral daily  sodium chloride 0.9%. 1000 milliLiter(s) IV Continuous <Continuous>    PRN MEDICATIONS  acetaminophen     Tablet .. 650 milliGRAM(s) Oral every 6 hours PRN  guaifenesin/dextromethorphan Oral Liquid 10 milliLiter(s) Oral every 4 hours PRN                                            ------------------------------------------------------------  VITAL SIGNS: Last 24 Hours  T(C): 36.3 (2022 12:58), Max: 37.2 (2022 15:30)  T(F): 97.4 (2022 12:58), Max: 98.9 (2022 15:30)  HR: 91 (2022 12:58) (90 - 114)  BP: 117/56 (2022 12:58) (117/56 - 139/83)  BP(mean): --  RR: 18 (2022 12:58) (18 - 18)  SpO2: 99% (2022 21:59) (99% - 100%)      22 @ 07:01  -  22 @ 07:00  --------------------------------------------------------  IN: 435 mL / OUT: 300 mL / NET: 135 mL    22 @ 07:01  -  22 @ 14:53  --------------------------------------------------------  IN: 330 mL / OUT: 0 mL / NET: 330 mL                                             --------------------------------------------------------------  LABS:                        9.4    5.39  )-----------( 377      ( 2022 04:30 )             30.7         139  |  103  |  5<L>  ----------------------------<  137<H>  4.5   |  23  |  0.5<L>    Ca    9.4      2022 04:30  Mg     2.1         TPro  6.6  /  Alb  4.0  /  TBili  <0.2  /  DBili  x   /  AST  27  /  ALT  65<H>  /  AlkPhos  106        Urinalysis Basic - ( 2022 12:55 )    Color: Yellow / Appearance: Clear / S.024 / pH: x  Gluc: x / Ketone: Negative  / Bili: Negative / Urobili: <2 mg/dL   Blood: x / Protein: Trace / Nitrite: Negative   Leuk Esterase: Negative / RBC: x / WBC x   Sq Epi: x / Non Sq Epi: x / Bacteria: x        Troponin T, Serum: <0.01 ng/mL (22 @ 04:30)  Troponin T, Serum: <0.01 ng/mL (22 @ 23:30)  Troponin T, Serum: <0.01 ng/mL (22 @ 15:26)          CARDIAC MARKERS ( 2022 04:30 )  x     / <0.01 ng/mL / x     / x     / x      CARDIAC MARKERS ( 2022 23:30 )  x     / <0.01 ng/mL / x     / x     / x      CARDIAC MARKERS ( 2022 15:26 )  x     / <0.01 ng/mL / x     / x     / x      CARDIAC MARKERS ( 2022 11:47 )  x     / <0.01 ng/mL / x     / x     / x                                                  -------------------------------------------------------------  RADIOLOGY:  Chest Xray noted: No acute cardiopulmonary disease   CTA noted: No PE                                          --------------------------------------------------------------    PHYSICAL EXAM:  GENERAL: NAD, AAO x 4, 36y F  HEAD:  Atraumatic, Normocephalic  EYES: EOMI, conjunctiva and sclera white  NECK: Supple, No JVD  CHEST/LUNG: Clear to auscultation bilaterally; No wheeze; No crackles; No accessory muscles used  HEART: Regular rate and rhythm; No murmurs;   ABDOMEN: Soft, Nontender, Nondistended; Bowel sounds present; No guarding, No organomegaly  EXTREMITIES:  2+ Peripheral Pulses, No cyanosis or edema  NEUROLOGY: non-focal                                           --------------------------------------------------------------    ASSESSMENT & PLAN   36-year-old, F, patient, PMHx: Type I DM ( since the age of 16 months) on an insulin pump complicated by Diabetic neuropathy , HTN, chronic back pain, migraine headaches, recently admitted to the hospital fo DKA and COVID, discharged yesterday, presents to the hospital due to syncope.    # Fainting x2, r/o syncope  # R/o Munchausen's syndrome, factitious disorder. Pt with multiple somatic complaints  - trops neg x 3   - orthostatic VS negative in ED  - s/p 2L LR bolus in ED, c/w IV hydration  - ECG (22 @ 12:13): Line Sinus rhythm with short AR; Prolonged QT  - EP consult appreciated: Patient had outpatient workup with Dr. Hopper   - obtain records from patient's cardiologist (Dr. Christian), check results of stress echo and holter monitor( pt reports it was done in 2021)  - Start corlanor 5mg po bid  - Increase metoprolol to 25mg q8hrs  - CCTA  - 30-day cardiac monitor (MCOT) prior to discharg- f/u TSH, folate/B12   - Fall precautions.   - Psychiatry and Neurology cs    # Hx of COVID-19 PNA, resolved  - c/w Robitussin 10mL Q4 PRN Cough  - Currently COVID neg  - On RA, afebrile, no indication for steroid     # Abdominal Pain, possibly IBS  # mild LFTs elevation  - Seen by GI during last admission   CT Abdomen and Pelvis w/ IV Cont 21:  No acute intra-abdominal or pelvic pathology is identified  - RUQ US no significant findings  - monitor LFTs > downtrending   - follow up outpatient with GI For MRI of liver lesions and colonoscopy to r/o IBD    #  Type I DM   # Diabetic neuropathy  - pt is on insulin pump (insulin supply will last for 3 days in the pump. May need to switch to subQ when insulin running out from the pump, or get non-formulary from pharmacy)  - patient discharged on  with pump.   - Last admission: Seen by endocrine. Was on Lantus 40 am, 12 pm, and 10 lispro TID before meals  - Will restart Lantus pm and lispro TID, f/u am for lantus adjustment   - Patient not using insulin pump currently     # Anxiety Disorder   - c/w lexapro 5 mg and amitriptyline 75mg HS  - Psych consult f/u     Diet: CC  Activity: as tolerated  DVT Prophylaxis: lovenox subQ  GI Prophylaxis: famotidine  CHG Order  Code Status: full code  Disposition: From assisted living, acute          CRISTI MONTGOMERY 36y Female  MRN#: [id number]   CODE STATUS: FULL     Hospital Day: 1d    Pt is currently admitted with the primary diagnosis of syncope     SUBJECTIVE  Hospital Course  36-year-old, F, patient, PMHx: Type I DM ( since the age of 16 months) on an insulin pump complicated by Diabetic neuropathy , HTN, chronic back pain, migraine headaches, recently admitted to the hospital fo DKA and COVID, discharged yesterday presents to the hospital due to syncope.   Patient reports syncopal episode twice today, which was witnessed by her mother. Pt was at the assisted living when she collapsed, and her mother caught her; second time patient was in bathroom getting dressed and felt lightheaded and weak, and passed out. She reported having continuous chest pain, SOB and palpitations before the event.   + dizzy, weakness, and nausea. Denies head injury, no seizure like activity, no urinary or bowel incontinence, no tongue biting.   Pt dad 3 similar syncopal episodes during her last admission, has also been having retrosternal chest pain for past 3 weeks during her past hospitalization.  Reports lower abdominal pain for past 3 weeks, not sexually active.   Denies fever, chills, headache, SOB, palpitations, urinary symptoms, leg pain, leg swelling, and pain elsewhere.    Labs significant for glucose 262. Hgb 10.6, WBC 4.5. Na 132. UA negative. Trop negative.   EKG QTc 492.  CXR negative for cardiopulmonary disease. CTA negative for Pulm embolism.  Pt was given 2g Mag and 2L of LR bolus in ED.     Overnight events: No events overnight, no events on tele     Subjective complaints: She states that chest pain is pressure like, has palpitations and has been occurring for years. Abdominal pain has also been occurring on and off for years. She is in assisted living with her parents. Family was evicted from apartment, was living temporarily with family and in there car. Now in assisted living.     Present Today:   - Freitas:  No [  ], Yes [   ] : Indication:     - Type of IV Access:       .. CVC/Piccline:  No [  ], Yes [   ] : Indication:       .. Midline: No [  ], Yes [   ] : Indication:                                             ----------------------------------------------------------  OBJECTIVE  PAST MEDICAL & SURGICAL HISTORY  Neuropathy  right lower extremity, vaginal    Type 1 diabetes    Degenerative disc disease, thoracic    Urinary tract infection, recurrent    Gastroesophageal reflux disease, esophagitis presence not specified    Intractable headache, unspecified chronicity pattern, unspecified headache type    Major depressive disorder with single episode, in full remission  previously treated with zyprexa, celexa and lexapro    Tremor of right hand    Tachycardia    Spinal stenosis    No significant past surgical history                                              -----------------------------------------------------------  ALLERGIES:  amoxicillin (Unknown)  Ceclor (Rash)  ciprofloxacin (Unknown)  clindamycin (Unknown)  flu vaccines (Other)  gabapentin (Unknown)  Influenza Virus Vaccine, H5N1, Inactivated (Unknown)  penicillins (Unknown)                                            ------------------------------------------------------------    HOME MEDICATIONS  Home Medications:  amitriptyline 75 mg oral tablet: 1 tab(s) orally once a day (at bedtime) (2021 15:06)  ammonium lactate 12% topical cream: Apply topically to affected area 2 times a day (23 Dec 2021 13:53)  aspirin 81 mg oral tablet, chewable: 1 tab(s) orally once a day (28 Dec 2021 09:29)  cyclobenzaprine 10 mg oral tablet: 1 tab(s) orally once a day (at bedtime) (28 Dec 2021 08:33)  famotidine 40 mg oral tablet: 1 tab(s) orally 2 times a day (2022 14:26)  metoprolol tartrate 25 mg oral tablet: 1 tab(s) orally 2 times a day (2021 15:06)  multivitamin: 1 tab(s) orally once a day (2021 15:06)                           MEDICATIONS:  STANDING MEDICATIONS  amitriptyline Oral Tab/Cap - Peds 75 milliGRAM(s) Oral at bedtime  ammonium lactate 12% Lotion 1 Application(s) Topical two times a day  aspirin  chewable 81 milliGRAM(s) Oral daily  chlorhexidine 4% Liquid 1 Application(s) Topical <User Schedule>  dextrose 40% Gel 15 Gram(s) Oral once  dextrose 5%. 1000 milliLiter(s) IV Continuous <Continuous>  dextrose 5%. 1000 milliLiter(s) IV Continuous <Continuous>  dextrose 50% Injectable 25 Gram(s) IV Push once  dextrose 50% Injectable 12.5 Gram(s) IV Push once  dextrose 50% Injectable 25 Gram(s) IV Push once  enoxaparin Injectable 40 milliGRAM(s) SubCutaneous daily  escitalopram 5 milliGRAM(s) Oral daily  famotidine    Tablet 40 milliGRAM(s) Oral two times a day  glucagon  Injectable 1 milliGRAM(s) IntraMuscular once  insulin glargine Injectable (LANTUS) 12 Unit(s) SubCutaneous at bedtime  insulin lispro (ADMELOG) corrective regimen sliding scale   SubCutaneous three times a day before meals  insulin lispro Injectable (ADMELOG) 10 Unit(s) SubCutaneous three times a day before meals  ivabradine 5 milliGRAM(s) Oral two times a day  metoprolol tartrate 25 milliGRAM(s) Oral every 8 hours  multivitamin Oral Tab/Cap - Peds 1 Tablet(s) Oral daily  naproxen 250 milliGRAM(s) Oral two times a day  polyethylene glycol 3350 17 Gram(s) Oral daily  sodium chloride 0.9%. 1000 milliLiter(s) IV Continuous <Continuous>    PRN MEDICATIONS  acetaminophen     Tablet .. 650 milliGRAM(s) Oral every 6 hours PRN  guaifenesin/dextromethorphan Oral Liquid 10 milliLiter(s) Oral every 4 hours PRN                                            ------------------------------------------------------------  VITAL SIGNS: Last 24 Hours  T(C): 36.3 (2022 12:58), Max: 37.2 (2022 15:30)  T(F): 97.4 (2022 12:58), Max: 98.9 (2022 15:30)  HR: 91 (2022 12:58) (90 - 114)  BP: 117/56 (2022 12:58) (117/56 - 139/83)  BP(mean): --  RR: 18 (2022 12:58) (18 - 18)  SpO2: 99% (2022 21:59) (99% - 100%)      22 @ 07:01  -  22 @ 07:00  --------------------------------------------------------  IN: 435 mL / OUT: 300 mL / NET: 135 mL    22 @ 07:01  -  22 @ 14:53  --------------------------------------------------------  IN: 330 mL / OUT: 0 mL / NET: 330 mL                                             --------------------------------------------------------------  LABS:                        9.4    5.39  )-----------( 377      ( 2022 04:30 )             30.7         139  |  103  |  5<L>  ----------------------------<  137<H>  4.5   |  23  |  0.5<L>    Ca    9.4      2022 04:30  Mg     2.1         TPro  6.6  /  Alb  4.0  /  TBili  <0.2  /  DBili  x   /  AST  27  /  ALT  65<H>  /  AlkPhos  106        Urinalysis Basic - ( 2022 12:55 )    Color: Yellow / Appearance: Clear / S.024 / pH: x  Gluc: x / Ketone: Negative  / Bili: Negative / Urobili: <2 mg/dL   Blood: x / Protein: Trace / Nitrite: Negative   Leuk Esterase: Negative / RBC: x / WBC x   Sq Epi: x / Non Sq Epi: x / Bacteria: x        Troponin T, Serum: <0.01 ng/mL (22 @ 04:30)  Troponin T, Serum: <0.01 ng/mL (22 @ 23:30)  Troponin T, Serum: <0.01 ng/mL (22 @ 15:26)          CARDIAC MARKERS ( 2022 04:30 )  x     / <0.01 ng/mL / x     / x     / x      CARDIAC MARKERS ( 2022 23:30 )  x     / <0.01 ng/mL / x     / x     / x      CARDIAC MARKERS ( 2022 15:26 )  x     / <0.01 ng/mL / x     / x     / x      CARDIAC MARKERS ( 2022 11:47 )  x     / <0.01 ng/mL / x     / x     / x                                                  -------------------------------------------------------------  RADIOLOGY:  Chest Xray noted: No acute cardiopulmonary disease   CTA noted: No PE                                          --------------------------------------------------------------    PHYSICAL EXAM:  GENERAL: NAD, AAO x 4, 36y F  HEAD:  Atraumatic, Normocephalic  EYES: EOMI, conjunctiva and sclera white  NECK: Supple, No JVD  CHEST/LUNG: Clear to auscultation bilaterally; No wheeze; No crackles; No accessory muscles used  HEART: Regular rate and rhythm; No murmurs;   ABDOMEN: Soft, Nontender, Nondistended; Bowel sounds present; No guarding, No organomegaly  EXTREMITIES:  2+ Peripheral Pulses, No cyanosis or edema  NEUROLOGY: non-focal                                           --------------------------------------------------------------    ASSESSMENT & PLAN   36-year-old, F, patient, PMHx: Type I DM ( since the age of 16 months) on an insulin pump complicated by Diabetic neuropathy , HTN, chronic back pain, migraine headaches, recently admitted to the hospital fo DKA and COVID, discharged yesterday, presents to the hospital due to syncope.    # Fainting x2, r/o syncope  # R/o Munchausen's syndrome, factitious disorder. Pt with multiple somatic complaints  - trops neg x 3   - orthostatic VS negative in ED  - s/p 2L LR bolus in ED, c/w IV hydration  - ECG (22 @ 12:13): Line Sinus rhythm with short IA; Prolonged QT  - EP consult appreciated: Patient had outpatient workup with Dr. Hopper. F/U records. Recommending [facility] on d/c and CCTA. Metoprolol increased. Started on Corlanor.   - obtain records from patient's cardiologist (Dr. Christian), check results of stress echo and holter monitor( pt reports it was done in 2021)  - Started on corlanor 5mg po bid as per EP  - Increased metoprolol to 25mg q8hrs as per EP  - CCTA ordered however, she cannot  - f/u TSH, folate/B12   - Fall precautions.   - Psychiatry Consulted: Not factitious disorder. Likely Anxiety related  - Neurology consult: Recommended cardiac workup. No EEG needed. From neuro standpoint, no further work up needed.   - F/U Cardiology Dr. Hopper     # Hx of COVID-19 PNA, resolved  - c/w Robitussin 10mL Q4 PRN Cough  - Currently COVID neg  - On RA, afebrile, no indication for steroid     # Abdominal Pain, possibly IBS  # mild LFTs elevation  - Seen by GI during last admission   CT Abdomen and Pelvis w/ IV Cont 21:  No acute intra-abdominal or pelvic pathology is identified  - RUQ US no significant findings  - monitor LFTs > downtrending   - follow up outpatient with GI For MRI of liver lesions and colonoscopy to r/o IBD    #  Type I DM   # Diabetic neuropathy  - pt is on insulin pump (insulin supply will last for 3 days in the pump. May need to switch to subQ when insulin running out from the pump, or get non-formulary from pharmacy)  - patient discharged on  with pump.   - Last admission: Seen by endocrine. Was on Lantus 40 am, 12 pm, and 10 lispro TID before meals  - Will restart Lantus pm and lispro TID, f/u am for lantus adjustment   - Patient not using insulin pump currently     # Anxiety Disorder   - c/w lexapro 5 mg and amitriptyline 75mg HS  - Psych consult f/u     Diet: CC  Activity: as tolerated  DVT Prophylaxis: lovenox subQ  GI Prophylaxis: famotidine  CHG Order  Code Status: full code  Disposition: From assisted living, acute

## 2022-01-15 LAB
ALBUMIN SERPL ELPH-MCNC: 4 G/DL — SIGNIFICANT CHANGE UP (ref 3.5–5.2)
ALP SERPL-CCNC: 120 U/L — HIGH (ref 30–115)
ALT FLD-CCNC: 56 U/L — HIGH (ref 0–41)
ANION GAP SERPL CALC-SCNC: 14 MMOL/L — SIGNIFICANT CHANGE UP (ref 7–14)
AST SERPL-CCNC: 19 U/L — SIGNIFICANT CHANGE UP (ref 0–41)
BASOPHILS # BLD AUTO: 0.03 K/UL — SIGNIFICANT CHANGE UP (ref 0–0.2)
BASOPHILS NFR BLD AUTO: 0.7 % — SIGNIFICANT CHANGE UP (ref 0–1)
BILIRUB SERPL-MCNC: 0.3 MG/DL — SIGNIFICANT CHANGE UP (ref 0.2–1.2)
BUN SERPL-MCNC: 9 MG/DL — LOW (ref 10–20)
CALCIUM SERPL-MCNC: 9.1 MG/DL — SIGNIFICANT CHANGE UP (ref 8.5–10.1)
CHLORIDE SERPL-SCNC: 96 MMOL/L — LOW (ref 98–110)
CO2 SERPL-SCNC: 21 MMOL/L — SIGNIFICANT CHANGE UP (ref 17–32)
CREAT SERPL-MCNC: 0.6 MG/DL — LOW (ref 0.7–1.5)
CULTURE RESULTS: SIGNIFICANT CHANGE UP
EOSINOPHIL # BLD AUTO: 0.12 K/UL — SIGNIFICANT CHANGE UP (ref 0–0.7)
EOSINOPHIL NFR BLD AUTO: 2.8 % — SIGNIFICANT CHANGE UP (ref 0–8)
GLUCOSE BLDC GLUCOMTR-MCNC: 255 MG/DL — HIGH (ref 70–99)
GLUCOSE BLDC GLUCOMTR-MCNC: 294 MG/DL — HIGH (ref 70–99)
GLUCOSE BLDC GLUCOMTR-MCNC: 49 MG/DL — CRITICAL LOW (ref 70–99)
GLUCOSE BLDC GLUCOMTR-MCNC: 82 MG/DL — SIGNIFICANT CHANGE UP (ref 70–99)
GLUCOSE BLDC GLUCOMTR-MCNC: 92 MG/DL — SIGNIFICANT CHANGE UP (ref 70–99)
GLUCOSE SERPL-MCNC: 380 MG/DL — HIGH (ref 70–99)
HAV IGM SER-ACNC: SIGNIFICANT CHANGE UP
HBV CORE IGM SER-ACNC: SIGNIFICANT CHANGE UP
HBV SURFACE AG SER-ACNC: SIGNIFICANT CHANGE UP
HCT VFR BLD CALC: 33.2 % — LOW (ref 37–47)
HCV AB S/CO SERPL IA: 0.1 S/CO — SIGNIFICANT CHANGE UP (ref 0–0.99)
HCV AB SERPL-IMP: SIGNIFICANT CHANGE UP
HGB BLD-MCNC: 10.3 G/DL — LOW (ref 12–16)
IMM GRANULOCYTES NFR BLD AUTO: 0.5 % — HIGH (ref 0.1–0.3)
LYMPHOCYTES # BLD AUTO: 2.22 K/UL — SIGNIFICANT CHANGE UP (ref 1.2–3.4)
LYMPHOCYTES # BLD AUTO: 52.2 % — HIGH (ref 20.5–51.1)
MAGNESIUM SERPL-MCNC: 1.8 MG/DL — SIGNIFICANT CHANGE UP (ref 1.8–2.4)
MCHC RBC-ENTMCNC: 26.1 PG — LOW (ref 27–31)
MCHC RBC-ENTMCNC: 31 G/DL — LOW (ref 32–37)
MCV RBC AUTO: 84.1 FL — SIGNIFICANT CHANGE UP (ref 81–99)
MONOCYTES # BLD AUTO: 0.47 K/UL — SIGNIFICANT CHANGE UP (ref 0.1–0.6)
MONOCYTES NFR BLD AUTO: 11.1 % — HIGH (ref 1.7–9.3)
NEUTROPHILS # BLD AUTO: 1.39 K/UL — LOW (ref 1.4–6.5)
NEUTROPHILS NFR BLD AUTO: 32.7 % — LOW (ref 42.2–75.2)
NRBC # BLD: 0 /100 WBCS — SIGNIFICANT CHANGE UP (ref 0–0)
PLATELET # BLD AUTO: 376 K/UL — SIGNIFICANT CHANGE UP (ref 130–400)
POTASSIUM SERPL-MCNC: 5 MMOL/L — SIGNIFICANT CHANGE UP (ref 3.5–5)
POTASSIUM SERPL-SCNC: 5 MMOL/L — SIGNIFICANT CHANGE UP (ref 3.5–5)
PROT SERPL-MCNC: 6.6 G/DL — SIGNIFICANT CHANGE UP (ref 6–8)
RBC # BLD: 3.95 M/UL — LOW (ref 4.2–5.4)
RBC # FLD: 14.2 % — SIGNIFICANT CHANGE UP (ref 11.5–14.5)
SODIUM SERPL-SCNC: 131 MMOL/L — LOW (ref 135–146)
SPECIMEN SOURCE: SIGNIFICANT CHANGE UP
WBC # BLD: 4.25 K/UL — LOW (ref 4.8–10.8)
WBC # FLD AUTO: 4.25 K/UL — LOW (ref 4.8–10.8)

## 2022-01-15 PROCEDURE — 99232 SBSQ HOSP IP/OBS MODERATE 35: CPT

## 2022-01-15 RX ORDER — INSULIN LISPRO 100/ML
VIAL (ML) SUBCUTANEOUS
Refills: 0 | Status: DISCONTINUED | OUTPATIENT
Start: 2022-01-15 | End: 2022-01-20

## 2022-01-15 RX ORDER — INSULIN LISPRO 100/ML
15 VIAL (ML) SUBCUTANEOUS
Refills: 0 | Status: DISCONTINUED | OUTPATIENT
Start: 2022-01-15 | End: 2022-01-18

## 2022-01-15 RX ORDER — INSULIN LISPRO 100/ML
4 VIAL (ML) SUBCUTANEOUS ONCE
Refills: 0 | Status: DISCONTINUED | OUTPATIENT
Start: 2022-01-15 | End: 2022-01-15

## 2022-01-15 RX ORDER — INSULIN HUMAN 100 [IU]/ML
4 INJECTION, SOLUTION SUBCUTANEOUS ONCE
Refills: 0 | Status: COMPLETED | OUTPATIENT
Start: 2022-01-15 | End: 2022-01-15

## 2022-01-15 RX ORDER — INSULIN LISPRO 100/ML
5 VIAL (ML) SUBCUTANEOUS ONCE
Refills: 0 | Status: COMPLETED | OUTPATIENT
Start: 2022-01-15 | End: 2022-01-15

## 2022-01-15 RX ORDER — CYCLOBENZAPRINE HYDROCHLORIDE 10 MG/1
10 TABLET, FILM COATED ORAL AT BEDTIME
Refills: 0 | Status: DISCONTINUED | OUTPATIENT
Start: 2022-01-15 | End: 2022-01-20

## 2022-01-15 RX ADMIN — INSULIN GLARGINE 40 UNIT(S): 100 INJECTION, SOLUTION SUBCUTANEOUS at 08:18

## 2022-01-15 RX ADMIN — Medication 1 TABLET(S): at 12:13

## 2022-01-15 RX ADMIN — FAMOTIDINE 40 MILLIGRAM(S): 10 INJECTION INTRAVENOUS at 18:06

## 2022-01-15 RX ADMIN — Medication 75 MILLIGRAM(S): at 21:16

## 2022-01-15 RX ADMIN — Medication 1 APPLICATION(S): at 05:34

## 2022-01-15 RX ADMIN — ESCITALOPRAM OXALATE 5 MILLIGRAM(S): 10 TABLET, FILM COATED ORAL at 12:13

## 2022-01-15 RX ADMIN — ENOXAPARIN SODIUM 40 MILLIGRAM(S): 100 INJECTION SUBCUTANEOUS at 12:12

## 2022-01-15 RX ADMIN — Medication 5 UNIT(S): at 03:30

## 2022-01-15 RX ADMIN — Medication 250 MILLIGRAM(S): at 18:23

## 2022-01-15 RX ADMIN — Medication 81 MILLIGRAM(S): at 12:13

## 2022-01-15 RX ADMIN — FAMOTIDINE 40 MILLIGRAM(S): 10 INJECTION INTRAVENOUS at 05:36

## 2022-01-15 RX ADMIN — Medication 3: at 08:17

## 2022-01-15 RX ADMIN — Medication 25 MILLIGRAM(S): at 21:16

## 2022-01-15 RX ADMIN — Medication 13 UNIT(S): at 08:17

## 2022-01-15 RX ADMIN — Medication 250 MILLIGRAM(S): at 05:32

## 2022-01-15 RX ADMIN — IVABRADINE 5 MILLIGRAM(S): 7.5 TABLET, FILM COATED ORAL at 18:07

## 2022-01-15 RX ADMIN — Medication 25 MILLIGRAM(S): at 05:32

## 2022-01-15 RX ADMIN — Medication 25 MILLIGRAM(S): at 18:07

## 2022-01-15 RX ADMIN — CYCLOBENZAPRINE HYDROCHLORIDE 10 MILLIGRAM(S): 10 TABLET, FILM COATED ORAL at 22:36

## 2022-01-15 RX ADMIN — Medication 250 MILLIGRAM(S): at 18:06

## 2022-01-15 RX ADMIN — IVABRADINE 5 MILLIGRAM(S): 7.5 TABLET, FILM COATED ORAL at 05:35

## 2022-01-15 RX ADMIN — INSULIN HUMAN 4 UNIT(S): 100 INJECTION, SOLUTION SUBCUTANEOUS at 06:49

## 2022-01-15 NOTE — PHYSICAL THERAPY INITIAL EVALUATION ADULT - LEVEL OF INDEPENDENCE: STAIR NEGOTIATION, REHAB EVAL
Due to COVID-19 precautions; however pt. independent with mobility and lives in home without stairs./unable to perform

## 2022-01-15 NOTE — PHYSICAL THERAPY INITIAL EVALUATION ADULT - SPECIFY REASON(S)
PT not indicated in acute setting. Education provided at pt. request regarding OP PT for balance training due to diabetic neuropathy. Rec. cont mobility with nursing staff Supervision.

## 2022-01-15 NOTE — PROGRESS NOTE ADULT - SUBJECTIVE AND OBJECTIVE BOX
CRISTI MONTGOMERY 36y Female  MRN#: [id number]   CODE STATUS: full     Hospital Day: 2d    Pt is currently admitted with the primary diagnosis of syncope     SUBJECTIVE    Overnight events: No events overnight     Subjective complaints: She still endorses chest pain, abdominal     Present Today:   - Freitas:  No [  ], Yes [   ] : Indication:     - Type of IV Access:       .. CVC/Piccline:  No [  ], Yes [   ] : Indication:       .. Midline: No [  ], Yes [   ] : Indication:                                             ----------------------------------------------------------  OBJECTIVE  PAST MEDICAL & SURGICAL HISTORY  Neuropathy  right lower extremity, vaginal    Type 1 diabetes    Degenerative disc disease, thoracic    Urinary tract infection, recurrent    Gastroesophageal reflux disease, esophagitis presence not specified    Intractable headache, unspecified chronicity pattern, unspecified headache type    Major depressive disorder with single episode, in full remission  previously treated with zyprexa, celexa and lexapro    Tremor of right hand    Tachycardia    Spinal stenosis    No significant past surgical history                                              -----------------------------------------------------------  ALLERGIES:  amoxicillin (Unknown)  Ceclor (Rash)  ciprofloxacin (Unknown)  clindamycin (Unknown)  flu vaccines (Other)  gabapentin (Unknown)  Influenza Virus Vaccine, H5N1, Inactivated (Unknown)  penicillins (Unknown)                                            ------------------------------------------------------------    HOME MEDICATIONS  Home Medications:  amitriptyline 75 mg oral tablet: 1 tab(s) orally once a day (at bedtime) (04 Nov 2021 15:06)  ammonium lactate 12% topical cream: Apply topically to affected area 2 times a day (23 Dec 2021 13:53)  aspirin 81 mg oral tablet, chewable: 1 tab(s) orally once a day (28 Dec 2021 09:29)  cyclobenzaprine 10 mg oral tablet: 1 tab(s) orally once a day (at bedtime) (28 Dec 2021 08:33)  famotidine 40 mg oral tablet: 1 tab(s) orally 2 times a day (11 Jan 2022 14:26)  metoprolol tartrate 25 mg oral tablet: 1 tab(s) orally 2 times a day (04 Nov 2021 15:06)  multivitamin: 1 tab(s) orally once a day (04 Nov 2021 15:06)                           MEDICATIONS:  STANDING MEDICATIONS  amitriptyline Oral Tab/Cap - Peds 75 milliGRAM(s) Oral at bedtime  ammonium lactate 12% Lotion 1 Application(s) Topical two times a day  aspirin  chewable 81 milliGRAM(s) Oral daily  chlorhexidine 4% Liquid 1 Application(s) Topical <User Schedule>  dextrose 40% Gel 15 Gram(s) Oral once  dextrose 5%. 1000 milliLiter(s) IV Continuous <Continuous>  dextrose 5%. 1000 milliLiter(s) IV Continuous <Continuous>  dextrose 50% Injectable 25 Gram(s) IV Push once  dextrose 50% Injectable 12.5 Gram(s) IV Push once  dextrose 50% Injectable 25 Gram(s) IV Push once  enoxaparin Injectable 40 milliGRAM(s) SubCutaneous daily  escitalopram 5 milliGRAM(s) Oral daily  famotidine    Tablet 40 milliGRAM(s) Oral two times a day  glucagon  Injectable 1 milliGRAM(s) IntraMuscular once  insulin glargine Injectable (LANTUS) 40 Unit(s) SubCutaneous every morning  insulin lispro (ADMELOG) corrective regimen sliding scale   SubCutaneous three times a day before meals  insulin lispro Injectable (ADMELOG) 15 Unit(s) SubCutaneous three times a day before meals  ivabradine 5 milliGRAM(s) Oral two times a day  metoprolol tartrate 25 milliGRAM(s) Oral every 8 hours  multivitamin Oral Tab/Cap - Peds 1 Tablet(s) Oral daily  naproxen 250 milliGRAM(s) Oral two times a day  polyethylene glycol 3350 17 Gram(s) Oral daily    PRN MEDICATIONS  acetaminophen     Tablet .. 650 milliGRAM(s) Oral every 6 hours PRN  guaifenesin/dextromethorphan Oral Liquid 10 milliLiter(s) Oral every 4 hours PRN                                            ------------------------------------------------------------  VITAL SIGNS: Last 24 Hours  T(C): 36.1 (15 Brayden 2022 05:17), Max: 36.1 (14 Jan 2022 20:09)  T(F): 96.9 (15 Brayden 2022 05:17), Max: 96.9 (14 Jan 2022 20:09)  HR: 75 (15 Brayden 2022 05:17) (75 - 94)  BP: 108/55 (15 Brayden 2022 05:17) (108/55 - 134/70)  BP(mean): --  RR: 18 (15 Brayden 2022 08:52) (18 - 18)  SpO2: 100% (15 Brayden 2022 08:52) (100% - 100%)      01-14-22 @ 07:01  -  01-15-22 @ 07:00  --------------------------------------------------------  IN: 1790 mL / OUT: 500 mL / NET: 1290 mL                                             --------------------------------------------------------------  LABS:                        10.3   4.25  )-----------( 376      ( 15 Brayden 2022 06:49 )             33.2     01-15    131<L>  |  96<L>  |  9<L>  ----------------------------<  380<H>  5.0   |  21  |  0.6<L>    Ca    9.1      15 Brayden 2022 06:49  Mg     1.8     01-15    TPro  6.6  /  Alb  4.0  /  TBili  0.3  /  DBili  x   /  AST  19  /  ALT  56<H>  /  AlkPhos  120<H>  01-15                Culture - Urine (collected 13 Jan 2022 12:55)  Source: Clean Catch Clean Catch (Midstream)  Preliminary Report (15 Brayden 2022 11:01):    >100,000 CFU/ml Gram positive organisms    <10,000 CFU/ml Normal Urogenital susie present        CARDIAC MARKERS ( 14 Jan 2022 04:30 )  x     / <0.01 ng/mL / x     / x     / x      CARDIAC MARKERS ( 13 Jan 2022 23:30 )  x     / <0.01 ng/mL / x     / x     / x      CARDIAC MARKERS ( 13 Jan 2022 15:26 )  x     / <0.01 ng/mL / x     / x     / x                                                  -------------------------------------------------------------  RADIOLOGY:                                            --------------------------------------------------------------    PHYSICAL EXAM:  GENERAL: NAD, AAO x 4, 36y F  HEAD:  Atraumatic, Normocephalic  EYES: EOMI, conjunctiva and sclera white  NECK: Supple, No JVD  CHEST/LUNG: Clear to auscultation bilaterally; No wheeze; No crackles; No accessory muscles used  HEART: Regular rate and rhythm; No murmurs;   ABDOMEN: Soft, Nontender, Nondistended; Bowel sounds present; No guarding, No organomegaly  EXTREMITIES:  2+ Peripheral Pulses, No cyanosis or edema  NEUROLOGY: non-focal                                           --------------------------------------------------------------    ASSESSMENT & PLAN  36-year-old, F, patient, PMHx: Type I DM ( since the age of 16 months) on an insulin pump complicated by Diabetic neuropathy , HTN, chronic back pain, migraine headaches, recently admitted to the hospital fo DKA and COVID, discharged yesterday, presents to the hospital due to syncope.    # Fainting x2, r/o syncope  - trops neg x 3   - orthostatic VS negative in ED  - s/p 2L LR bolus in ED, c/w IV hydration  - ECG (01.13.22 @ 12:13): Line Sinus rhythm with short [place]; Prolonged QT  - EP consult appreciated: Patient had outpatient workup with Dr. Hopper. F/U records. Recommending [facility] on d/c and CCTA. Metoprolol increased. Started on Corlanor.   - obtain records from patient's cardiologist (Dr. Christian), check results of stress echo and holter monitor( pt reports it was done in nov. 2021)  - Started on corlanor 5mg po bid as per EP  - Increased metoprolol to 25mg q8hrs as per EP  - f/u TSH, folate/B12   - Fall precautions.   - Psychiatry Consulted: Not factitious disorder. Likely Anxiety related  - Neurology consult: Recommended cardiac workup. No EEG needed. From neuro standpoint, no further work up needed.   - Cardiology Dr. Hopper: No further cardiology work up this admission. No CCTA. Can f/u outpatient for [facility] with Dr. Hopper's office.   - D/C tele  - Cannot return to assisted living till Monday     # Hx of COVID-19 PNA, resolved  - c/w Robitussin 10mL Q4 PRN Cough  - Currently COVID neg  - On RA, afebrile, no indication for steroid     # Abdominal Pain, possibly IBS  # mild LFTs elevation  - Seen by GI during last admission   CT Abdomen and Pelvis w/ IV Cont 12/2/21:  No acute intra-abdominal or pelvic pathology is identified  - RUQ US no significant findings  - monitor LFTs > downtrending   - follow up outpatient with GI For MRI of liver lesions and colonoscopy to r/o IBD    #  Type I DM   # Diabetic neuropathy  - pt is on insulin pump (insulin supply will last for 3 days in the pump. May need to switch to subQ when insulin running out from the pump, or get non-formulary from pharmacy)  - patient discharged on 1/12 with pump.   - Last admission: Seen by endocrine. Was on Lantus 40 am, 12 pm, and 10 lispro TID before meals  - Lantus 40 am, Lispro 15 TID this admission   - Patient needs instruction on insulin pump    # Anxiety Disorder   - c/w lexapro 5 mg and amitriptyline 75mg HS  - Psych consult f/u     Diet: CC  Activity: as tolerated  DVT Prophylaxis: lovenox subQ  GI Prophylaxis: famotidine  CHG Order  Code Status: full code  Disposition: From assisted living    Progress note handoff:  - No further workup for syncope/ chest pain. F/u with Dr. Hopper outpatient for [facility]    - No further neuro workup   - Gi f/u outpatient for IBS  - D/C Monday back to assisted living        CRISTI MONTGOMERY 36y Female  MRN#: [id number]   CODE STATUS: full     Hospital Day: 2d    Pt is currently admitted with the primary diagnosis of syncope     SUBJECTIVE    Overnight events: No events overnight     Subjective complaints: She still endorses chest pain, abdominal pain.     Present Today:   - Freitas:  No [  ], Yes [   ] : Indication:     - Type of IV Access:       .. CVC/Piccline:  No [  ], Yes [   ] : Indication:       .. Midline: No [  ], Yes [   ] : Indication:                                             ----------------------------------------------------------  OBJECTIVE  PAST MEDICAL & SURGICAL HISTORY  Neuropathy  right lower extremity, vaginal    Type 1 diabetes    Degenerative disc disease, thoracic    Urinary tract infection, recurrent    Gastroesophageal reflux disease, esophagitis presence not specified    Intractable headache, unspecified chronicity pattern, unspecified headache type    Major depressive disorder with single episode, in full remission  previously treated with zyprexa, celexa and lexapro    Tremor of right hand    Tachycardia    Spinal stenosis    No significant past surgical history                                              -----------------------------------------------------------  ALLERGIES:  amoxicillin (Unknown)  Ceclor (Rash)  ciprofloxacin (Unknown)  clindamycin (Unknown)  flu vaccines (Other)  gabapentin (Unknown)  Influenza Virus Vaccine, H5N1, Inactivated (Unknown)  penicillins (Unknown)                                            ------------------------------------------------------------    HOME MEDICATIONS  Home Medications:  amitriptyline 75 mg oral tablet: 1 tab(s) orally once a day (at bedtime) (04 Nov 2021 15:06)  ammonium lactate 12% topical cream: Apply topically to affected area 2 times a day (23 Dec 2021 13:53)  aspirin 81 mg oral tablet, chewable: 1 tab(s) orally once a day (28 Dec 2021 09:29)  cyclobenzaprine 10 mg oral tablet: 1 tab(s) orally once a day (at bedtime) (28 Dec 2021 08:33)  famotidine 40 mg oral tablet: 1 tab(s) orally 2 times a day (11 Jan 2022 14:26)  metoprolol tartrate 25 mg oral tablet: 1 tab(s) orally 2 times a day (04 Nov 2021 15:06)  multivitamin: 1 tab(s) orally once a day (04 Nov 2021 15:06)                           MEDICATIONS:  STANDING MEDICATIONS  amitriptyline Oral Tab/Cap - Peds 75 milliGRAM(s) Oral at bedtime  ammonium lactate 12% Lotion 1 Application(s) Topical two times a day  aspirin  chewable 81 milliGRAM(s) Oral daily  chlorhexidine 4% Liquid 1 Application(s) Topical <User Schedule>  dextrose 40% Gel 15 Gram(s) Oral once  dextrose 5%. 1000 milliLiter(s) IV Continuous <Continuous>  dextrose 5%. 1000 milliLiter(s) IV Continuous <Continuous>  dextrose 50% Injectable 25 Gram(s) IV Push once  dextrose 50% Injectable 12.5 Gram(s) IV Push once  dextrose 50% Injectable 25 Gram(s) IV Push once  enoxaparin Injectable 40 milliGRAM(s) SubCutaneous daily  escitalopram 5 milliGRAM(s) Oral daily  famotidine    Tablet 40 milliGRAM(s) Oral two times a day  glucagon  Injectable 1 milliGRAM(s) IntraMuscular once  insulin glargine Injectable (LANTUS) 40 Unit(s) SubCutaneous every morning  insulin lispro (ADMELOG) corrective regimen sliding scale   SubCutaneous three times a day before meals  insulin lispro Injectable (ADMELOG) 15 Unit(s) SubCutaneous three times a day before meals  ivabradine 5 milliGRAM(s) Oral two times a day  metoprolol tartrate 25 milliGRAM(s) Oral every 8 hours  multivitamin Oral Tab/Cap - Peds 1 Tablet(s) Oral daily  naproxen 250 milliGRAM(s) Oral two times a day  polyethylene glycol 3350 17 Gram(s) Oral daily    PRN MEDICATIONS  acetaminophen     Tablet .. 650 milliGRAM(s) Oral every 6 hours PRN  guaifenesin/dextromethorphan Oral Liquid 10 milliLiter(s) Oral every 4 hours PRN                                            ------------------------------------------------------------  VITAL SIGNS: Last 24 Hours  T(C): 36.1 (15 Brayden 2022 05:17), Max: 36.1 (14 Jan 2022 20:09)  T(F): 96.9 (15 Brayden 2022 05:17), Max: 96.9 (14 Jan 2022 20:09)  HR: 75 (15 Brayden 2022 05:17) (75 - 94)  BP: 108/55 (15 Brayden 2022 05:17) (108/55 - 134/70)  BP(mean): --  RR: 18 (15 Brayden 2022 08:52) (18 - 18)  SpO2: 100% (15 Brayden 2022 08:52) (100% - 100%)      01-14-22 @ 07:01  -  01-15-22 @ 07:00  --------------------------------------------------------  IN: 1790 mL / OUT: 500 mL / NET: 1290 mL                                             --------------------------------------------------------------  LABS:                        10.3   4.25  )-----------( 376      ( 15 Brayden 2022 06:49 )             33.2     01-15    131<L>  |  96<L>  |  9<L>  ----------------------------<  380<H>  5.0   |  21  |  0.6<L>    Ca    9.1      15 Brayden 2022 06:49  Mg     1.8     01-15    TPro  6.6  /  Alb  4.0  /  TBili  0.3  /  DBili  x   /  AST  19  /  ALT  56<H>  /  AlkPhos  120<H>  01-15                Culture - Urine (collected 13 Jan 2022 12:55)  Source: Clean Catch Clean Catch (Midstream)  Preliminary Report (15 Brayden 2022 11:01):    >100,000 CFU/ml Gram positive organisms    <10,000 CFU/ml Normal Urogenital susie present        CARDIAC MARKERS ( 14 Jan 2022 04:30 )  x     / <0.01 ng/mL / x     / x     / x      CARDIAC MARKERS ( 13 Jan 2022 23:30 )  x     / <0.01 ng/mL / x     / x     / x      CARDIAC MARKERS ( 13 Jan 2022 15:26 )  x     / <0.01 ng/mL / x     / x     / x                                                  -------------------------------------------------------------  RADIOLOGY:                                            --------------------------------------------------------------    PHYSICAL EXAM:  GENERAL: NAD, AAO x 4, 36y F  HEAD:  Atraumatic, Normocephalic  EYES: EOMI, conjunctiva and sclera white  NECK: Supple, No JVD  CHEST/LUNG: Clear to auscultation bilaterally; No wheeze; No crackles; No accessory muscles used  HEART: Regular rate and rhythm; No murmurs;   ABDOMEN: Soft, Nontender, Nondistended; Bowel sounds present; No guarding, No organomegaly  EXTREMITIES:  2+ Peripheral Pulses, No cyanosis or edema  NEUROLOGY: non-focal                                           --------------------------------------------------------------    ASSESSMENT & PLAN  36-year-old, F, patient, PMHx: Type I DM ( since the age of 16 months) on an insulin pump complicated by Diabetic neuropathy , HTN, chronic back pain, migraine headaches, recently admitted to the hospital fo DKA and COVID, discharged yesterday, presents to the hospital due to syncope.    # Fainting x2, r/o syncope  - trops neg x 3   - orthostatic VS negative in ED  - s/p 2L LR bolus in ED, c/w IV hydration  - ECG (01.13.22 @ 12:13): Line Sinus rhythm with short [place]; Prolonged QT  - EP consult appreciated: Patient had outpatient workup with Dr. Hopper. F/U records. Recommending [facility] on d/c and CCTA. Metoprolol increased. Started on Corlanor.   - obtain records from patient's cardiologist (Dr. Christian), check results of stress echo and holter monitor( pt reports it was done in nov. 2021)  - Started on corlanor 5mg po bid as per EP  - Increased metoprolol to 25mg q8hrs as per EP  - f/u TSH, folate/B12   - Fall precautions.   - Psychiatry Consulted: Not factitious disorder. Likely Anxiety related  - Neurology consult: Recommended cardiac workup. No EEG needed. From neuro standpoint, no further work up needed.   - Cardiology Dr. Hopper: No further cardiology work up this admission. No CCTA. Can f/u outpatient for [facility] with Dr. Hopper's office.   - D/C tele  - Cannot return to assisted living till Monday     # Hx of COVID-19 PNA, resolved  - c/w Robitussin 10mL Q4 PRN Cough  - Currently COVID neg  - On RA, afebrile, no indication for steroid     # Abdominal Pain, possibly IBS  # mild LFTs elevation  - Seen by GI during last admission   CT Abdomen and Pelvis w/ IV Cont 12/2/21:  No acute intra-abdominal or pelvic pathology is identified  - RUQ US no significant findings  - monitor LFTs > downtrending   - follow up outpatient with GI For MRI of liver lesions and colonoscopy to r/o IBD    #  Type I DM   # Diabetic neuropathy  - pt is on insulin pump (insulin supply will last for 3 days in the pump. May need to switch to subQ when insulin running out from the pump, or get non-formulary from pharmacy)  - patient discharged on 1/12 with pump.   - Last admission: Seen by endocrine. Was on Lantus 40 am, 12 pm, and 10 lispro TID before meals  - Lantus 40 am, Lispro 15 TID this admission   - Patient needs instruction on insulin pump    # Anxiety Disorder   - c/w lexapro 5 mg and amitriptyline 75mg HS  - Psych consult f/u     Diet: CC  Activity: as tolerated  DVT Prophylaxis: lovenox subQ  GI Prophylaxis: famotidine  CHG Order  Code Status: full code  Disposition: From assisted living    Progress note handoff:  - No further workup for syncope/ chest pain. F/u with Dr. Hopper outpatient for [facility]    - No further neuro workup   - Gi f/u outpatient for IBS  - D/C Monday back to assisted living        CRISTI MONTGOMERY 36y Female  MRN#: [id number]   CODE STATUS: full     Hospital Day: 2d    Pt is currently admitted with the primary diagnosis of syncope     SUBJECTIVE    Overnight events: No events overnight     Subjective complaints: She still endorses chest pain, abdominal pain.     Present Today:   - Freitas:  No [  ], Yes [   ] : Indication:     - Type of IV Access:       .. CVC/Piccline:  No [  ], Yes [   ] : Indication:       .. Midline: No [  ], Yes [   ] : Indication:                                             ----------------------------------------------------------  OBJECTIVE  PAST MEDICAL & SURGICAL HISTORY  Neuropathy  right lower extremity, vaginal    Type 1 diabetes    Degenerative disc disease, thoracic    Urinary tract infection, recurrent    Gastroesophageal reflux disease, esophagitis presence not specified    Intractable headache, unspecified chronicity pattern, unspecified headache type    Major depressive disorder with single episode, in full remission  previously treated with zyprexa, celexa and lexapro    Tremor of right hand    Tachycardia    Spinal stenosis    No significant past surgical history                                              -----------------------------------------------------------  ALLERGIES:  amoxicillin (Unknown)  Ceclor (Rash)  ciprofloxacin (Unknown)  clindamycin (Unknown)  flu vaccines (Other)  gabapentin (Unknown)  Influenza Virus Vaccine, H5N1, Inactivated (Unknown)  penicillins (Unknown)                                            ------------------------------------------------------------    HOME MEDICATIONS  Home Medications:  amitriptyline 75 mg oral tablet: 1 tab(s) orally once a day (at bedtime) (04 Nov 2021 15:06)  ammonium lactate 12% topical cream: Apply topically to affected area 2 times a day (23 Dec 2021 13:53)  aspirin 81 mg oral tablet, chewable: 1 tab(s) orally once a day (28 Dec 2021 09:29)  cyclobenzaprine 10 mg oral tablet: 1 tab(s) orally once a day (at bedtime) (28 Dec 2021 08:33)  famotidine 40 mg oral tablet: 1 tab(s) orally 2 times a day (11 Jan 2022 14:26)  metoprolol tartrate 25 mg oral tablet: 1 tab(s) orally 2 times a day (04 Nov 2021 15:06)  multivitamin: 1 tab(s) orally once a day (04 Nov 2021 15:06)                           MEDICATIONS:  STANDING MEDICATIONS  amitriptyline Oral Tab/Cap - Peds 75 milliGRAM(s) Oral at bedtime  ammonium lactate 12% Lotion 1 Application(s) Topical two times a day  aspirin  chewable 81 milliGRAM(s) Oral daily  chlorhexidine 4% Liquid 1 Application(s) Topical <User Schedule>  dextrose 40% Gel 15 Gram(s) Oral once  dextrose 5%. 1000 milliLiter(s) IV Continuous <Continuous>  dextrose 5%. 1000 milliLiter(s) IV Continuous <Continuous>  dextrose 50% Injectable 25 Gram(s) IV Push once  dextrose 50% Injectable 12.5 Gram(s) IV Push once  dextrose 50% Injectable 25 Gram(s) IV Push once  enoxaparin Injectable 40 milliGRAM(s) SubCutaneous daily  escitalopram 5 milliGRAM(s) Oral daily  famotidine    Tablet 40 milliGRAM(s) Oral two times a day  glucagon  Injectable 1 milliGRAM(s) IntraMuscular once  insulin glargine Injectable (LANTUS) 40 Unit(s) SubCutaneous every morning  insulin lispro (ADMELOG) corrective regimen sliding scale   SubCutaneous three times a day before meals  insulin lispro Injectable (ADMELOG) 15 Unit(s) SubCutaneous three times a day before meals  ivabradine 5 milliGRAM(s) Oral two times a day  metoprolol tartrate 25 milliGRAM(s) Oral every 8 hours  multivitamin Oral Tab/Cap - Peds 1 Tablet(s) Oral daily  naproxen 250 milliGRAM(s) Oral two times a day  polyethylene glycol 3350 17 Gram(s) Oral daily    PRN MEDICATIONS  acetaminophen     Tablet .. 650 milliGRAM(s) Oral every 6 hours PRN  guaifenesin/dextromethorphan Oral Liquid 10 milliLiter(s) Oral every 4 hours PRN                                            ------------------------------------------------------------  VITAL SIGNS: Last 24 Hours  T(C): 36.1 (15 Brayden 2022 05:17), Max: 36.1 (14 Jan 2022 20:09)  T(F): 96.9 (15 Brayden 2022 05:17), Max: 96.9 (14 Jan 2022 20:09)  HR: 75 (15 Brayden 2022 05:17) (75 - 94)  BP: 108/55 (15 Brayden 2022 05:17) (108/55 - 134/70)  BP(mean): --  RR: 18 (15 Brayden 2022 08:52) (18 - 18)  SpO2: 100% (15 Brayden 2022 08:52) (100% - 100%)      01-14-22 @ 07:01  -  01-15-22 @ 07:00  --------------------------------------------------------  IN: 1790 mL / OUT: 500 mL / NET: 1290 mL                                             --------------------------------------------------------------  LABS:                        10.3   4.25  )-----------( 376      ( 15 Brayden 2022 06:49 )             33.2     01-15    131<L>  |  96<L>  |  9<L>  ----------------------------<  380<H>  5.0   |  21  |  0.6<L>    Ca    9.1      15 Brayden 2022 06:49  Mg     1.8     01-15    TPro  6.6  /  Alb  4.0  /  TBili  0.3  /  DBili  x   /  AST  19  /  ALT  56<H>  /  AlkPhos  120<H>  01-15                Culture - Urine (collected 13 Jan 2022 12:55)  Source: Clean Catch Clean Catch (Midstream)  Preliminary Report (15 Brayden 2022 11:01):    >100,000 CFU/ml Gram positive organisms    <10,000 CFU/ml Normal Urogenital susie present        CARDIAC MARKERS ( 14 Jan 2022 04:30 )  x     / <0.01 ng/mL / x     / x     / x      CARDIAC MARKERS ( 13 Jan 2022 23:30 )  x     / <0.01 ng/mL / x     / x     / x      CARDIAC MARKERS ( 13 Jan 2022 15:26 )  x     / <0.01 ng/mL / x     / x     / x                                                  -------------------------------------------------------------  RADIOLOGY:                                            --------------------------------------------------------------    PHYSICAL EXAM:  GENERAL: NAD,  36y F  HEAD:  Atraumatic, Normocephalic  EYES: EOMI, conjunctiva and sclera white  NECK: Supple, No JVD  CHEST/LUNG: Clear to auscultation bilaterally; No wheeze; No crackles; No accessory muscles used  HEART: Regular rate and rhythm; No murmurs;   ABDOMEN: Soft, Nontender, Nondistended; Bowel sounds present; No guarding, No organomegaly  EXTREMITIES:  2+ Peripheral Pulses, No cyanosis or edema  NEUROLOGY: non-focal  PSYCH: AAO x 4,                                           --------------------------------------------------------------    ASSESSMENT & PLAN  36-year-old, F, patient, PMHx: Type I DM ( since the age of 16 months) on an insulin pump complicated by Diabetic neuropathy , HTN, chronic back pain, migraine headaches, recently admitted to the hospital fo DKA and COVID, discharged yesterday, presents to the hospital due to syncope.    # Syncope x2,  - trops neg x 3   - orthostatic VS negative in ED  - s/p 2L LR bolus in ED, c/w IV hydration  - ECG (01.13.22 @ 12:13): Line Sinus rhythm with short MI; Prolonged QT  - EP consult appreciated: Patient had outpatient workup with Dr. Hopper. F/U records. Recommending [facility]OT on d/c and CCTA. Metoprolol increased. Started on Corlanor.   - obtain records from patient's cardiologist (Dr. Christian), check results of stress echo and holter monitor( pt reports it was done in nov. 2021)  - Started on corlanor 5mg po bid as per EP  - Increased metoprolol to 25mg q8hrs as per EP  - f/u TSH, folate/B12   - Fall precautions.   - Psychiatry Consulted: Not factitious disorder. Likely Anxiety related  - Neurology consult: Recommended cardiac workup. No EEG needed. From neuro standpoint, no further work up needed.   - Cardiology Dr. Hopper: No further cardiology work up this admission. No CCTA. Can f/u outpatient for MCOT with Dr. Hopper's office.   - D/C tele  - Cannot return to assisted living till Monday     # Hx of COVID-19 PNA, resolved  - c/w Robitussin 10mL Q4 PRN Cough  - Currently COVID neg  - On RA, afebrile, no indication for steroid     # Abdominal Pain, possibly IBS  # mild LFTs elevation  - Seen by GI during last admission   CT Abdomen and Pelvis w/ IV Cont 12/2/21:  No acute intra-abdominal or pelvic pathology is identified  - RUQ US no significant findings  - monitor LFTs > downtrending   - follow up outpatient with GI For MRI of liver lesions and colonoscopy to r/o IBD    #  Type I DM   # Diabetic neuropathy  - pt is on insulin pump (insulin supply will last for 3 days in the pump. May need to switch to subQ when insulin running out from the pump, or get non-formulary from pharmacy)  - patient discharged on 1/12 with pump.   - Last admission: Seen by endocrine. Was on Lantus 40 am, 12 pm, and 10 lispro TID before meals  - Lantus 40 am, Lispro 15 TID this admission   - Patient needs instruction on insulin pump    # Anxiety Disorder   - c/w lexapro 5 mg and amitriptyline 75mg HS  - Psych consult f/u     Diet: CC  Activity: as tolerated  DVT Prophylaxis: lovenox subQ  GI Prophylaxis: famotidine  CHG Order  Code Status: full code  Disposition: From assisted living    Progress note handoff:  - No further workup for syncope/ chest pain. F/u with Dr. Hopper outpatient for MCOT    - No further neuro workup   - Gi f/u outpatient for IBS  - D/C Monday back to assisted living

## 2022-01-15 NOTE — PROGRESS NOTE ADULT - ATTENDING COMMENTS
Pt seen and examined at bedside. Case d/w cardiology who recommended no further in patient work up therefore patient is ready for discharge, however pt cannot return to [facility] until Monday (1/17). Will c/w telemetry until discharge/when pt gets MCOT.    #Progress Note Handoff:  Pending (specify):  MCOT, return to group home  Family discussion: see above  Disposition: Home___/SNF___/Other__group home______/Unknown at this time________

## 2022-01-15 NOTE — PROGRESS NOTE ADULT - SUBJECTIVE AND OBJECTIVE BOX
Date of Admission: 1/13/22    CHIEF COMPLAINT: syncope, multiple medical cmoplaints    HISTORY OF PRESENT ILLNESS: 36yFemale with PMH below presented to the hospital for multiple medical complaints but mainly syncope. Well known to our practice, visits the ER every few days with a new complaint. EPS following for syncope. Had a recent stress echo in our office which was WNL.     PAST MEDICAL & SURGICAL HISTORY:  Neuropathy  right lower extremity, vaginal    Type 1 diabetes    Degenerative disc disease, thoracic    Urinary tract infection, recurrent    Gastroesophageal reflux disease, esophagitis presence not specified    Intractable headache, unspecified chronicity pattern, unspecified headache type    Major depressive disorder with single episode, in full remission  previously treated with zyprexa, celexa and lexapro    Tremor of right hand    Tachycardia    Spinal stenosis    No significant past surgical history        FAMILY HISTORY:  [ ] no pertinent family history of premature cardiovascular disease in first degree relatives.  Mother:   Father:   Siblings:     SOCIAL HISTORY:    [ ] Non-smoker  [ ] Smoker  [ ] Alcohol    Allergies    amoxicillin (Unknown)  Ceclor (Rash)  ciprofloxacin (Unknown)  clindamycin (Unknown)  flu vaccines (Other)  gabapentin (Unknown)  Influenza Virus Vaccine, H5N1, Inactivated (Unknown)  penicillins (Unknown)    Intolerances    	    REVIEW OF SYSTEMS:  unable to appropriately assess due to patient stating that everything is wrong with her.     PHYSICAL EXAM:  T(C): 36.1 (01-15-22 @ 05:17), Max: 36.3 (01-14-22 @ 12:58)  HR: 75 (01-15-22 @ 05:17) (75 - 94)  BP: 108/55 (01-15-22 @ 05:17) (108/55 - 134/70)  RR: 18 (01-15-22 @ 08:52) (18 - 18)  SpO2: 100% (01-15-22 @ 08:52) (100% - 100%)  Wt(kg): --  I&O's Summary    14 Jan 2022 07:01  -  15 Brayden 2022 07:00  --------------------------------------------------------  IN: 1790 mL / OUT: 500 mL / NET: 1290 mL        General Appearance: older than stated age. 	  Neck: normal JVP, no bruit.   Eyes: No xanthomalasia, Extra Ocular muscles intact.   Cardiovascular: regular rate and rhythm S1 S2, No JVD, No murmurs, No edema  Respiratory: Lungs clear to auscultation	  Psychiatry: Alert and oriented x 3, Mood & affect appropriate  Gastrointestinal:  Soft, Non-tender  Skin/Integumen: No rashes, No ecchymoses, No cyanosis	  Neurologic: Non-focal  Musculoskeletal/ extremities: Normal range of motion, No clubbing, cyanosis or edema  Vascular: Peripheral pulses palpable 2+ bilaterally    LABS:	 	                          10.3   4.25  )-----------( 376      ( 15 Brayden 2022 06:49 )             33.2     01-15    131<L>  |  96<L>  |  9<L>  ----------------------------<  380<H>  5.0   |  21  |  0.6<L>    Ca    9.1      15 Brayden 2022 06:49  Mg     1.8     01-15    TPro  6.6  /  Alb  4.0  /  TBili  0.3  /  DBili  x   /  AST  19  /  ALT  56<H>  /  AlkPhos  120<H>  01-15    CARDIAC MARKERS ( 14 Jan 2022 04:30 )  x     / <0.01 ng/mL / x     / x     / x      CARDIAC MARKERS ( 13 Jan 2022 23:30 )  x     / <0.01 ng/mL / x     / x     / x      CARDIAC MARKERS ( 13 Jan 2022 15:26 )  x     / <0.01 ng/mL / x     / x     / x      CARDIAC MARKERS ( 13 Jan 2022 11:47 )  x     / <0.01 ng/mL / x     / x     / x              CARDIAC MARKERS:            TELEMETRY EVENTS: 	    ECG: < from: 12 Lead ECG (01.13.22 @ 12:13) >  Diagnosis Line Sinus rhythm with short NH  Prolonged QT  Abnormal ECG    < end of copied text >   	  RADIOLOGY:  OTHER: 	    PREVIOUS DIAGNOSTIC TESTING:    [x ] Echocardiogram: e< from: TTE Echo Complete w/o Contrast w/ Doppler (12.24.21 @ 07:28) >   1. Normal global left ventricular systolic function.   2. LV Ejection Fraction by Leahy's Method with a biplane EF of 56 %.   3. Normal left ventricular internal cavity size.   4. The mean global longitudinal peak strain by speckle tracking is   -16.8% which is borderline normal.   5. Normal left atrial size.   6. Normal right atrial size.   7. No evidence of mitral valve regurgitation.   8. Mild tricuspid regurgitation.   9. LA volume Index is 21.2 ml/m² ml/m2.    < end of copied text >    [ ]  Catheterization:  [x ] Stress Test:  12/1/21 1. Submaximal Stress test  2. There was no clinical, EKG or echocardiographic evidence of exercise induced ischemia at target HR  <85%.  3. The LVEF is 50-55%.  4. There is trace tricuspid regurgitation.  5. There is trace mitral regurgitation.  6. There is no pericardial effusion.  	  	    Home Medications:  amitriptyline 75 mg oral tablet: 1 tab(s) orally once a day (at bedtime) (04 Nov 2021 15:06)  ammonium lactate 12% topical cream: Apply topically to affected area 2 times a day (23 Dec 2021 13:53)  aspirin 81 mg oral tablet, chewable: 1 tab(s) orally once a day (28 Dec 2021 09:29)  cyclobenzaprine 10 mg oral tablet: 1 tab(s) orally once a day (at bedtime) (28 Dec 2021 08:33)  famotidine 40 mg oral tablet: 1 tab(s) orally 2 times a day (11 Jan 2022 14:26)  metoprolol tartrate 25 mg oral tablet: 1 tab(s) orally 2 times a day (04 Nov 2021 15:06)  multivitamin: 1 tab(s) orally once a day (04 Nov 2021 15:06)    MEDICATIONS  (STANDING):  amitriptyline Oral Tab/Cap - Peds 75 milliGRAM(s) Oral at bedtime  ammonium lactate 12% Lotion 1 Application(s) Topical two times a day  aspirin  chewable 81 milliGRAM(s) Oral daily  chlorhexidine 4% Liquid 1 Application(s) Topical <User Schedule>  dextrose 40% Gel 15 Gram(s) Oral once  dextrose 5%. 1000 milliLiter(s) (50 mL/Hr) IV Continuous <Continuous>  dextrose 5%. 1000 milliLiter(s) (100 mL/Hr) IV Continuous <Continuous>  dextrose 50% Injectable 25 Gram(s) IV Push once  dextrose 50% Injectable 12.5 Gram(s) IV Push once  dextrose 50% Injectable 25 Gram(s) IV Push once  enoxaparin Injectable 40 milliGRAM(s) SubCutaneous daily  escitalopram 5 milliGRAM(s) Oral daily  famotidine    Tablet 40 milliGRAM(s) Oral two times a day  glucagon  Injectable 1 milliGRAM(s) IntraMuscular once  insulin glargine Injectable (LANTUS) 40 Unit(s) SubCutaneous every morning  insulin lispro (ADMELOG) corrective regimen sliding scale   SubCutaneous three times a day before meals  insulin lispro Injectable (ADMELOG) 15 Unit(s) SubCutaneous three times a day before meals  ivabradine 5 milliGRAM(s) Oral two times a day  metoprolol tartrate 25 milliGRAM(s) Oral every 8 hours  multivitamin Oral Tab/Cap - Peds 1 Tablet(s) Oral daily  naproxen 250 milliGRAM(s) Oral two times a day  polyethylene glycol 3350 17 Gram(s) Oral daily    MEDICATIONS  (PRN):  acetaminophen     Tablet .. 650 milliGRAM(s) Oral every 6 hours PRN Mild Pain (1 - 3)  guaifenesin/dextromethorphan Oral Liquid 10 milliLiter(s) Oral every 4 hours PRN Cough

## 2022-01-15 NOTE — PHYSICAL THERAPY INITIAL EVALUATION ADULT - GENERAL OBSERVATIONS, REHAB EVAL
Chart reviewed. Pt. seated EOB upon PT arrival. D/w RN Venita who reports pt. is independent with mobility, ase reflected in AM-PAC scoring. Pt. without c/o, reports no needs for PT in acute setting.

## 2022-01-15 NOTE — PROGRESS NOTE ADULT - ASSESSMENT
2 episodes Syncope  - agree with MCOT on discharge either through EPS or our office.   - continue corlanor 5 mg po q12h and metoprolol. she did not tolerate 50 mg po 12h (hypotension)  - possible dehrydation as a cause of syncope  - syncope workup as per EPS   2 episodes Syncope  - agree with MCOT on discharge either through EPS or our office.   - continue corlanor 5 mg po q12h and metoprolol. she did not tolerate 50 mg po 12h (hypotension)  - possible dehrydation as a cause of syncope  - dispo as per primary team

## 2022-01-16 LAB
GLUCOSE BLDC GLUCOMTR-MCNC: 134 MG/DL — HIGH (ref 70–99)
GLUCOSE BLDC GLUCOMTR-MCNC: 182 MG/DL — HIGH (ref 70–99)
GLUCOSE BLDC GLUCOMTR-MCNC: 202 MG/DL — HIGH (ref 70–99)
GLUCOSE BLDC GLUCOMTR-MCNC: 221 MG/DL — HIGH (ref 70–99)
GLUCOSE BLDC GLUCOMTR-MCNC: 283 MG/DL — HIGH (ref 70–99)
GLUCOSE BLDC GLUCOMTR-MCNC: 368 MG/DL — HIGH (ref 70–99)
GLUCOSE BLDC GLUCOMTR-MCNC: 69 MG/DL — LOW (ref 70–99)

## 2022-01-16 PROCEDURE — 99233 SBSQ HOSP IP/OBS HIGH 50: CPT

## 2022-01-16 RX ORDER — NITROFURANTOIN MACROCRYSTAL 50 MG
100 CAPSULE ORAL
Refills: 0 | Status: COMPLETED | OUTPATIENT
Start: 2022-01-16 | End: 2022-01-19

## 2022-01-16 RX ORDER — INSULIN LISPRO 100/ML
5 VIAL (ML) SUBCUTANEOUS ONCE
Refills: 0 | Status: COMPLETED | OUTPATIENT
Start: 2022-01-16 | End: 2022-01-16

## 2022-01-16 RX ADMIN — ENOXAPARIN SODIUM 40 MILLIGRAM(S): 100 INJECTION SUBCUTANEOUS at 11:50

## 2022-01-16 RX ADMIN — ESCITALOPRAM OXALATE 5 MILLIGRAM(S): 10 TABLET, FILM COATED ORAL at 11:51

## 2022-01-16 RX ADMIN — Medication 2: at 08:10

## 2022-01-16 RX ADMIN — Medication 1 TABLET(S): at 11:51

## 2022-01-16 RX ADMIN — CHLORHEXIDINE GLUCONATE 1 APPLICATION(S): 213 SOLUTION TOPICAL at 05:13

## 2022-01-16 RX ADMIN — IVABRADINE 5 MILLIGRAM(S): 7.5 TABLET, FILM COATED ORAL at 18:05

## 2022-01-16 RX ADMIN — Medication 25 MILLIGRAM(S): at 21:39

## 2022-01-16 RX ADMIN — Medication 1 APPLICATION(S): at 17:08

## 2022-01-16 RX ADMIN — Medication 15 UNIT(S): at 11:49

## 2022-01-16 RX ADMIN — Medication 25 MILLIGRAM(S): at 17:08

## 2022-01-16 RX ADMIN — Medication 5 UNIT(S): at 00:25

## 2022-01-16 RX ADMIN — Medication 15 UNIT(S): at 08:10

## 2022-01-16 RX ADMIN — INSULIN GLARGINE 40 UNIT(S): 100 INJECTION, SOLUTION SUBCUTANEOUS at 08:11

## 2022-01-16 RX ADMIN — Medication 81 MILLIGRAM(S): at 11:50

## 2022-01-16 RX ADMIN — CYCLOBENZAPRINE HYDROCHLORIDE 10 MILLIGRAM(S): 10 TABLET, FILM COATED ORAL at 21:42

## 2022-01-16 RX ADMIN — Medication 15 UNIT(S): at 17:07

## 2022-01-16 RX ADMIN — FAMOTIDINE 40 MILLIGRAM(S): 10 INJECTION INTRAVENOUS at 18:05

## 2022-01-16 RX ADMIN — Medication 75 MILLIGRAM(S): at 21:39

## 2022-01-16 RX ADMIN — Medication 1: at 11:49

## 2022-01-16 RX ADMIN — Medication 2: at 17:07

## 2022-01-16 RX ADMIN — Medication 25 MILLIGRAM(S): at 05:13

## 2022-01-16 RX ADMIN — IVABRADINE 5 MILLIGRAM(S): 7.5 TABLET, FILM COATED ORAL at 05:14

## 2022-01-16 RX ADMIN — Medication 250 MILLIGRAM(S): at 17:09

## 2022-01-16 RX ADMIN — Medication 1 APPLICATION(S): at 05:14

## 2022-01-16 RX ADMIN — FAMOTIDINE 40 MILLIGRAM(S): 10 INJECTION INTRAVENOUS at 05:13

## 2022-01-16 RX ADMIN — Medication 250 MILLIGRAM(S): at 05:42

## 2022-01-16 RX ADMIN — Medication 250 MILLIGRAM(S): at 05:12

## 2022-01-16 RX ADMIN — Medication 100 MILLIGRAM(S): at 17:09

## 2022-01-16 RX ADMIN — Medication 250 MILLIGRAM(S): at 17:20

## 2022-01-16 NOTE — CHART NOTE - NSCHARTNOTEFT_GEN_A_CORE
Electrophysiology PA Note    Pt was discussed with primary cardiology, Dr Ovalles, yesterday  Pt is known to their office  Office, Dr Christian, will arrange and place MCOT as out pt  con't corlanor  no further EP work up at this time  recall per [id number]

## 2022-01-16 NOTE — PROGRESS NOTE ADULT - ASSESSMENT
35 yo F PMHx DM I since the age of 16 months) on an insulin pump complicated by Diabetic neuropathy , HTN, chronic back pain, migraine headaches, suspected IBS, and vulvodynia recently admitted to the hospital fo DKA and COVID, discharged yesterday, presents to the hospital due to syncope.    Syncope  - unclear etiology,  - trops neg x 3   - orthostatic VS negative in ED  - NSR on EKG  - I spoke with cardiology yesterday-recommended getting MCOT in office upon discharge  - had normal stress and holter monitor several months ago, as per cardiology no furhte rwork up  - c/ corlanor, metoprolol  - Psychiatry Consulted: Not factitious disorder. Likely Anxiety related  - Neurology consult: Recommended cardiac workup. No EEG needed. From neuro standpoint, no further work up needed.       # Hx of COVID-19 PNA, resolved  - c/w Robitussin 10mL Q4 PRN Cough  - Currently COVID neg  - On RA, afebrile, no indication for steroid     Vulvodynia  - chronic  - UA sent, pyuria, will do short course of macrobid  - follow up outpatient with GI For MRI of liver lesions and colonoscopy to r/o IBD    Type I DM   Diabetic neuropathy  - pt had pump teaching as outpt, states she is comfortable with it  - c/w Lantus/lispro while in patient       Anxiety Disorder   - c/w lexapro 5 mg and amitriptyline 75mg HS      Diet: CC  Activity: as tolerated  DVT Prophylaxis: lovenox subQ  GI Prophylaxis: famotidine  CHG Order  Code Status: full code  Disposition: From assisted living    #Progress Note Handoff:  Pending (specify):  [facility] tomorrow  Family discussion: discussed pending discharge  Disposition: Home___/SNF___/Other_____[facility]___/Unknown at this time________

## 2022-01-16 NOTE — PROGRESS NOTE ADULT - SUBJECTIVE AND OBJECTIVE BOX
CHIEF COMPLAINT:    Patient is a 36y old  Female who presents with a chief complaint of syncope       INTERVAL HPI/OVERNIGHT EVENTS:    Patient seen and examined at bedside. No acute overnight events occurred.    ROS: Reports frequent BM, suprapubic pain. All other systems are negative.    Vital Signs:    T(F): 97.2 (22 @ 04:24), Max: 97.8 (01-15-22 @ 13:54)  HR: 80 (22 @ 04:24) (80 - 89)  BP: 125/69 (22 @ 04:24) (111/61 - 125/69)  RR: 18 (01-15-22 @ 13:54) (18 - 18)  SpO2: --  I&O's Summary    15 Brayden 2022 07:01  -  2022 07:00  --------------------------------------------------------  IN: 960 mL / OUT: 1000 mL / NET: -40 mL      Daily     Daily Weight in k.6 (2022 04:24)  CAPILLARY BLOOD GLUCOSE      POCT Blood Glucose.: 202 mg/dL (2022 07:49)  POCT Blood Glucose.: 283 mg/dL (2022 01:55)  POCT Blood Glucose.: 368 mg/dL (2022 00:13)  POCT Blood Glucose.: 294 mg/dL (15 Brayden 2022 21:37)  POCT Blood Glucose.: 82 mg/dL (15 Brayden 2022 16:25)  POCT Blood Glucose.: 49 mg/dL (15 Brayden 2022 15:28)  POCT Blood Glucose.: 92 mg/dL (15 Brayden 2022 11:48)      PHYSICAL EXAM:  GENERAL:  NAD  SKIN: No rashes or lesions  HEENT: Atraumatic. Normocephalic. Anicteric  NECK:  No JVD.   PULMONARY: Clear to ausculation bilaterally. No wheezing. No rales  CVS: Normal S1, S2. Regular rate and rhythm. No murmurs.  ABDOMEN/GI: Soft, Nontender, Nondistended; Bowel sounds are present  EXTREMITIES:  No edema B/L LE.  NEUROLOGIC:  No motor deficit.  PSYCH: Alert & oriented x 3, normal affect    LABS:                        10.3   4.25  )-----------( 376      ( 15 Brayden 2022 06:49 )             33.2     01-15    131<L>  |  96<L>  |  9<L>  ----------------------------<  380<H>  5.0   |  21  |  0.6<L>    Ca    9.1      15 Brayden 2022 06:49  Mg     1.8     01-15    TPro  6.6  /  Alb  4.0  /  TBili  0.3  /  DBili  x   /  AST  19  /  ALT  56<H>  /  AlkPhos  120<H>  01-15      Serum Pro-Brain Natriuretic Peptide: <5 pg/mL (22 @ 11:47)    Trop <0.01, CKMB --, CK --, 22 @ 04:30  Trop <0.01, CKMB --, CK --, 22 @ 23:30  Trop <0.01, CKMB --, CK --, 22 @ 15:26  Trop <0.01, CKMB --, CK --, 22 @ 11:47      Culture - Urine (collected 2022 12:55)  Source: Clean Catch Clean Catch (Midstream)  Final Report (15 Brayden 2022 20:36):    >100,000 CFU/ml Streptococcus agalactiae (Group B) isolated    Group B streptococci are susceptible to ampicillin,    penicillin and cefazolin, but may be resistant to    erythromycin and clindamycin.    Recommendations for intrapartum prophylaxis for Group B    streptococci are penicillin or ampicillin.    <10,000 CFU/ml Normal Urogenital susie present        RADIOLOGY & ADDITIONAL TESTS:  Imaging or report Personally Reviewed:  [ ] YES  [ ] NO    Telemetry reviewed independently -     Medications:  Standing  amitriptyline Oral Tab/Cap - Peds 75 milliGRAM(s) Oral at bedtime  ammonium lactate 12% Lotion 1 Application(s) Topical two times a day  aspirin  chewable 81 milliGRAM(s) Oral daily  chlorhexidine 4% Liquid 1 Application(s) Topical <User Schedule>  dextrose 40% Gel 15 Gram(s) Oral once  dextrose 5%. 1000 milliLiter(s) IV Continuous <Continuous>  dextrose 5%. 1000 milliLiter(s) IV Continuous <Continuous>  dextrose 50% Injectable 25 Gram(s) IV Push once  dextrose 50% Injectable 12.5 Gram(s) IV Push once  dextrose 50% Injectable 25 Gram(s) IV Push once  enoxaparin Injectable 40 milliGRAM(s) SubCutaneous daily  escitalopram 5 milliGRAM(s) Oral daily  famotidine    Tablet 40 milliGRAM(s) Oral two times a day  glucagon  Injectable 1 milliGRAM(s) IntraMuscular once  insulin glargine Injectable (LANTUS) 40 Unit(s) SubCutaneous every morning  insulin lispro (ADMELOG) corrective regimen sliding scale   SubCutaneous Before meals and at bedtime  insulin lispro Injectable (ADMELOG) 15 Unit(s) SubCutaneous three times a day before meals  ivabradine 5 milliGRAM(s) Oral two times a day  metoprolol tartrate 25 milliGRAM(s) Oral every 8 hours  multivitamin Oral Tab/Cap - Peds 1 Tablet(s) Oral daily  naproxen 250 milliGRAM(s) Oral two times a day    PRN Meds  acetaminophen     Tablet .. 650 milliGRAM(s) Oral every 6 hours PRN  cyclobenzaprine 10 milliGRAM(s) Oral at bedtime PRN  guaifenesin/dextromethorphan Oral Liquid 10 milliLiter(s) Oral every 4 hours PRN      Case discussed with resident  Care discussed with pt         CHIEF COMPLAINT:    Patient is a 36y old  Female who presents with a chief complaint of syncope       INTERVAL HPI/OVERNIGHT EVENTS:    Patient seen and examined at bedside. No acute overnight events occurred.    ROS: Reports frequent BM, suprapubic pain. All other systems are negative.    Vital Signs:    T(F): 97.2 (22 @ 04:24), Max: 97.8 (01-15-22 @ 13:54)  HR: 80 (22 @ 04:24) (80 - 89)  BP: 125/69 (22 @ 04:24) (111/61 - 125/69)  RR: 18 (01-15-22 @ 13:54) (18 - 18)  SpO2: --  I&O's Summary    15 Brayden 2022 07:01  -  2022 07:00  --------------------------------------------------------  IN: 960 mL / OUT: 1000 mL / NET: -40 mL      Daily     Daily Weight in k.6 (2022 04:24)  CAPILLARY BLOOD GLUCOSE      POCT Blood Glucose.: 202 mg/dL (2022 07:49)  POCT Blood Glucose.: 283 mg/dL (2022 01:55)  POCT Blood Glucose.: 368 mg/dL (2022 00:13)  POCT Blood Glucose.: 294 mg/dL (15 Brayden 2022 21:37)  POCT Blood Glucose.: 82 mg/dL (15 Brayden 2022 16:25)  POCT Blood Glucose.: 49 mg/dL (15 Brayden 2022 15:28)  POCT Blood Glucose.: 92 mg/dL (15 Brayden 2022 11:48)      PHYSICAL EXAM:  GENERAL:  NAD  SKIN: No rashes or lesions  HEENT: Atraumatic. Normocephalic. Anicteric  NECK:  No JVD.   PULMONARY: Clear to ausculation bilaterally. No wheezing. No rales  CVS: Normal S1, S2. Regular rate and rhythm. No murmurs.  ABDOMEN/GI: Soft, Nontender, Nondistended; Bowel sounds are present  EXTREMITIES:  No edema B/L LE.  NEUROLOGIC:  No motor deficit.  PSYCH: Alert & oriented x 3, normal affect    LABS:                        10.3   4.25  )-----------( 376      ( 15 Brayden 2022 06:49 )             33.2     01-15    131<L>  |  96<L>  |  9<L>  ----------------------------<  380<H>  5.0   |  21  |  0.6<L>    Ca    9.1      15 Brayden 2022 06:49  Mg     1.8     01-15    TPro  6.6  /  Alb  4.0  /  TBili  0.3  /  DBili  x   /  AST  19  /  ALT  56<H>  /  AlkPhos  120<H>  01-15      Serum Pro-Brain Natriuretic Peptide: <5 pg/mL (22 @ 11:47)    Trop <0.01, CKMB --, CK --, 22 @ 04:30  Trop <0.01, CKMB --, CK --, 22 @ 23:30  Trop <0.01, CKMB --, CK --, 22 @ 15:26  Trop <0.01, CKMB --, CK --, 22 @ 11:47      Culture - Urine (collected 2022 12:55)  Source: Clean Catch Clean Catch (Midstream)  Final Report (15 Brayden 2022 20:36):    >100,000 CFU/ml Streptococcus agalactiae (Group B) isolated    Group B streptococci are susceptible to ampicillin,    penicillin and cefazolin, but may be resistant to    erythromycin and clindamycin.    Recommendations for intrapartum prophylaxis for Group B    streptococci are penicillin or ampicillin.    <10,000 CFU/ml Normal Urogenital susie present        RADIOLOGY & ADDITIONAL TESTS:  Imaging or report Personally Reviewed:  [ ] YES  [ ] NO    Telemetry reviewed independently - rapid heart rate briefly this morning    Medications:  Standing  amitriptyline Oral Tab/Cap - Peds 75 milliGRAM(s) Oral at bedtime  ammonium lactate 12% Lotion 1 Application(s) Topical two times a day  aspirin  chewable 81 milliGRAM(s) Oral daily  chlorhexidine 4% Liquid 1 Application(s) Topical <User Schedule>  dextrose 40% Gel 15 Gram(s) Oral once  dextrose 5%. 1000 milliLiter(s) IV Continuous <Continuous>  dextrose 5%. 1000 milliLiter(s) IV Continuous <Continuous>  dextrose 50% Injectable 25 Gram(s) IV Push once  dextrose 50% Injectable 12.5 Gram(s) IV Push once  dextrose 50% Injectable 25 Gram(s) IV Push once  enoxaparin Injectable 40 milliGRAM(s) SubCutaneous daily  escitalopram 5 milliGRAM(s) Oral daily  famotidine    Tablet 40 milliGRAM(s) Oral two times a day  glucagon  Injectable 1 milliGRAM(s) IntraMuscular once  insulin glargine Injectable (LANTUS) 40 Unit(s) SubCutaneous every morning  insulin lispro (ADMELOG) corrective regimen sliding scale   SubCutaneous Before meals and at bedtime  insulin lispro Injectable (ADMELOG) 15 Unit(s) SubCutaneous three times a day before meals  ivabradine 5 milliGRAM(s) Oral two times a day  metoprolol tartrate 25 milliGRAM(s) Oral every 8 hours  multivitamin Oral Tab/Cap - Peds 1 Tablet(s) Oral daily  naproxen 250 milliGRAM(s) Oral two times a day    PRN Meds  acetaminophen     Tablet .. 650 milliGRAM(s) Oral every 6 hours PRN  cyclobenzaprine 10 milliGRAM(s) Oral at bedtime PRN  guaifenesin/dextromethorphan Oral Liquid 10 milliLiter(s) Oral every 4 hours PRN      Case discussed with resident  Care discussed with pt

## 2022-01-17 ENCOUNTER — TRANSCRIPTION ENCOUNTER (OUTPATIENT)
Age: 37
End: 2022-01-17

## 2022-01-17 LAB
GLUCOSE BLDC GLUCOMTR-MCNC: 105 MG/DL — HIGH (ref 70–99)
GLUCOSE BLDC GLUCOMTR-MCNC: 126 MG/DL — HIGH (ref 70–99)
GLUCOSE BLDC GLUCOMTR-MCNC: 146 MG/DL — HIGH (ref 70–99)
GLUCOSE BLDC GLUCOMTR-MCNC: 229 MG/DL — HIGH (ref 70–99)
GLUCOSE BLDC GLUCOMTR-MCNC: 69 MG/DL — LOW (ref 70–99)
SARS-COV-2 RNA SPEC QL NAA+PROBE: DETECTED

## 2022-01-17 PROCEDURE — 99232 SBSQ HOSP IP/OBS MODERATE 35: CPT

## 2022-01-17 RX ORDER — IVABRADINE 7.5 MG/1
1 TABLET, FILM COATED ORAL
Qty: 2 | Refills: 0
Start: 2022-01-17 | End: 2022-01-17

## 2022-01-17 RX ADMIN — ENOXAPARIN SODIUM 40 MILLIGRAM(S): 100 INJECTION SUBCUTANEOUS at 12:03

## 2022-01-17 RX ADMIN — CHLORHEXIDINE GLUCONATE 1 APPLICATION(S): 213 SOLUTION TOPICAL at 06:00

## 2022-01-17 RX ADMIN — INSULIN GLARGINE 40 UNIT(S): 100 INJECTION, SOLUTION SUBCUTANEOUS at 07:57

## 2022-01-17 RX ADMIN — IVABRADINE 5 MILLIGRAM(S): 7.5 TABLET, FILM COATED ORAL at 17:13

## 2022-01-17 RX ADMIN — Medication 250 MILLIGRAM(S): at 17:13

## 2022-01-17 RX ADMIN — Medication 15 UNIT(S): at 07:58

## 2022-01-17 RX ADMIN — Medication 15 UNIT(S): at 17:13

## 2022-01-17 RX ADMIN — IVABRADINE 5 MILLIGRAM(S): 7.5 TABLET, FILM COATED ORAL at 06:00

## 2022-01-17 RX ADMIN — Medication 100 MILLIGRAM(S): at 05:59

## 2022-01-17 RX ADMIN — Medication 1 APPLICATION(S): at 06:00

## 2022-01-17 RX ADMIN — Medication 81 MILLIGRAM(S): at 12:03

## 2022-01-17 RX ADMIN — FAMOTIDINE 40 MILLIGRAM(S): 10 INJECTION INTRAVENOUS at 18:05

## 2022-01-17 RX ADMIN — ESCITALOPRAM OXALATE 5 MILLIGRAM(S): 10 TABLET, FILM COATED ORAL at 12:03

## 2022-01-17 RX ADMIN — Medication 1 APPLICATION(S): at 17:16

## 2022-01-17 RX ADMIN — CYCLOBENZAPRINE HYDROCHLORIDE 10 MILLIGRAM(S): 10 TABLET, FILM COATED ORAL at 22:18

## 2022-01-17 RX ADMIN — Medication 15 UNIT(S): at 12:03

## 2022-01-17 RX ADMIN — Medication 25 MILLIGRAM(S): at 05:59

## 2022-01-17 RX ADMIN — FAMOTIDINE 40 MILLIGRAM(S): 10 INJECTION INTRAVENOUS at 05:59

## 2022-01-17 RX ADMIN — Medication 2: at 07:58

## 2022-01-17 RX ADMIN — Medication 25 MILLIGRAM(S): at 13:26

## 2022-01-17 RX ADMIN — Medication 100 MILLIGRAM(S): at 17:13

## 2022-01-17 RX ADMIN — Medication 25 MILLIGRAM(S): at 21:45

## 2022-01-17 RX ADMIN — Medication 1 TABLET(S): at 12:03

## 2022-01-17 RX ADMIN — Medication 650 MILLIGRAM(S): at 14:19

## 2022-01-17 RX ADMIN — Medication 250 MILLIGRAM(S): at 17:43

## 2022-01-17 RX ADMIN — Medication 250 MILLIGRAM(S): at 05:59

## 2022-01-17 RX ADMIN — Medication 75 MILLIGRAM(S): at 21:45

## 2022-01-17 RX ADMIN — Medication 650 MILLIGRAM(S): at 14:49

## 2022-01-17 NOTE — DISCHARGE NOTE PROVIDER - NSDCCPCAREPLAN_GEN_ALL_CORE_FT
PRINCIPAL DISCHARGE DIAGNOSIS  Diagnosis: Syncope  Assessment and Plan of Treatment: Syncope  Syncope is when you temporarily lose consciousness, also called fainting or passing out. It is caused by a sudden decrease in blood flow to the brain. Even though most causes of syncope are not dangerous, syncope can possibly be a sign of a serious medical problem. Signs that you may be about to faint include feeling dizzy, lightheaded, nausea, visual changes, or cold/clammy skin. Do not drive, operate heavy machinery, or play sports until your health care provider says it is okay.  SEEK IMMEDIATE MEDICAL CARE IF YOU HAVE ANY OF THE FOLLOWING SYMPTOMS: severe headache, pain in your chest/abdomen/back, bleeding from your mouth or rectum, palpitations, shortness of breath, pain with breathing, seizure, confusion, or trouble walking.  A MCOT was placed to monitor your heart. Please follow up with Dr. Hopper and his office.          PRINCIPAL DISCHARGE DIAGNOSIS  Diagnosis: Syncope  Assessment and Plan of Treatment: Syncope  Syncope is when you temporarily lose consciousness, also called fainting or passing out. It is caused by a sudden decrease in blood flow to the brain. Even though most causes of syncope are not dangerous, syncope can possibly be a sign of a serious medical problem. Signs that you may be about to faint include feeling dizzy, lightheaded, nausea, visual changes, or cold/clammy skin. Do not drive, operate heavy machinery, or play sports until your health care provider says it is okay.  SEEK IMMEDIATE MEDICAL CARE IF YOU HAVE ANY OF THE FOLLOWING SYMPTOMS: severe headache, pain in your chest/abdomen/back, bleeding from your mouth or rectum, palpitations, shortness of breath, pain with breathing, seizure, confusion, or trouble walking.  A MCOT was placed to monitor your heart. Please follow up with Dr. Hopper and his office.         SECONDARY DISCHARGE DIAGNOSES  Diagnosis: Anxiety  Assessment and Plan of Treatment: You have an appointment with psychiatry.

## 2022-01-17 NOTE — DISCHARGE NOTE PROVIDER - CARE PROVIDERS DIRECT ADDRESSES
,DirectAddress_Unknown ,DirectAddress_Unknown,DirectAddress_Unknown ,DirectAddress_Unknown,DirectAddress_Unknown,DirectAddress_Unknown ,DirectAddress_Unknown,DirectAddress_Unknown,DirectAddress_Unknown,[email or web address] ,DirectAddress_Unknown,DirectAddress_Unknown,DirectAddress_Unknown,[email or web address],DirectAddress_Unknown

## 2022-01-17 NOTE — PROGRESS NOTE ADULT - ASSESSMENT
35 yo F PMHx DM I since the age of 16 months) on an insulin pump complicated by Diabetic neuropathy , HTN, chronic back pain, migraine headaches, suspected IBS, and vulvodynia recently admitted to the hospital fo DKA and COVID, discharged yesterday, presents to the hospital due to syncope.    Syncope  - unclear etiology,  - trops neg x 3   - orthostatic VS negative in ED  - NSR on EKG  - MCOT in place, can dc telemetry  - had normal stress and holter monitor several months ago, as per cardiology no furhte rwork up  - c/ corlanor, metoprolol  - Psychiatry Consulted: Not factitious disorder. Likely Anxiety related  - Neurology consult: Recommended cardiac workup. No EEG needed. From neuro standpoint, no further work up needed.       # Hx of COVID-19 PNA, resolved  - c/w Robitussin 10mL Q4 PRN Cough  - Currently COVID neg  - On RA, afebrile, no indication for steroid     Vulvodynia  - chronic  - UA sent, pyuria, will do short course of macrobid  - follow up outpatient with GI For MRI of liver lesions and colonoscopy to r/o IBD    Type I DM   Diabetic neuropathy  - pt had pump teaching as outpt, states she is comfortable with it  - c/w Lantus/lispro while in patient       Anxiety Disorder   - c/w lexapro 5 mg and amitriptyline 75mg HS      Diet: CC  Activity: as tolerated  DVT Prophylaxis: lovenox subQ  GI Prophylaxis: famotidine  CHG Order  Code Status: full code  Disposition: From assisted living    #Progress Note Handoff:  Pending (specify):  negative covid swab, prior auth for ibravadine  Family discussion: discussed pending discharge  Disposition: Home___/SNF___/Other_____[facility]'s residence___/Unknown at this time________

## 2022-01-17 NOTE — DISCHARGE NOTE PROVIDER - CARE PROVIDER_API CALL
Casey Christian (MD)  Cardiovascular Disease; Interventional Cardiology  [address] [facility] [address]  [place] [address]  Phone: [phone]  Fax: [phone]  Follow Up Time:    Casey Christian)  Cardiovascular Disease; Interventional Cardiology  [address] [place] [address]  Phone: [phone]  Fax: [phone]  Follow Up Time:     Martha Robertson)  Internal Medicine  [address] [place] [address]  Phone: [phone]  Fax: [phone]  Follow Up Time:    Casey Christian)  Cardiovascular Disease; Interventional Cardiology  [address] [facility] [address]  [place] [address]  Phone: [phone]  Fax: [phone]  Follow Up Time:     Martha Robertson)  Internal Medicine  [address] [place] [address]  Phone: [phone]  Fax: [phone]  Follow Up Time:     Trevor Gallegos)  Psychiatry  [address] [place] [address]  Phone: [phone]  Fax: [phone]  Follow Up Time: 1 week   Casey Christian)  Cardiovascular Disease; Interventional Cardiology  [address] [facility] [address]  [place] [address]  Phone: [phone]  Fax: [phone]  Follow Up Time:     Martha Robertson)  Internal Medicine  [address] [place] [address]  Phone: [phone]  Fax: [phone]  Follow Up Time:     Trevor Gallegos)  Psychiatry  [address] [place] [address]  Phone: [phone]  Fax: [phone]  Follow Up Time: 1 week    aRissa Schaeffer)  Gastroenterology; Internal Medicine  [address] [place] [address]  Phone: [phone]  Fax: [phone]  Follow Up Time: 2 weeks   Casey Christian)  Cardiovascular Disease; Interventional Cardiology  [address] [facility] [address]  [place] [address]  Phone: [phone]  Fax: [phone]  Follow Up Time:     Martha Robertson)  Internal Medicine  [address] [place] [address]  Phone: [phone]  Fax: [phone]  Follow Up Time:     Trevor Gallegos)  Psychiatry  [address] [place] [address]  Phone: [phone]  Fax: [phone]  Follow Up Time: 1 week    Raissa Schaeffer)  Gastroenterology; Internal Medicine  [address] [place] [address]  Phone: [phone]  Fax: [phone]  Follow Up Time: 2 weeks    Elizabeth Galvez)  Medicine  [address] [place] [address]  Phone: [phone]  Fax: [phone]  Established Patient  Follow Up Time: 1 week

## 2022-01-17 NOTE — DISCHARGE NOTE PROVIDER - NSDCMRMEDTOKEN_GEN_ALL_CORE_FT
Admelog 100 units/mL injectable solution: 80 unit(s) injectable once a day for pump  amitriptyline 75 mg oral tablet: 1 tab(s) orally once a day (at bedtime)  ammonium lactate 12% topical cream: Apply topically to affected area 2 times a day  aspirin 81 mg oral tablet, chewable: 1 tab(s) orally once a day  ClearLax oral powder for reconstitution: 17 gram(s) orally once a day  cyclobenzaprine 10 mg oral tablet: 1 tab(s) orally once a day (at bedtime)  famotidine 40 mg oral tablet: 1 tab(s) orally 2 times a day  guaifenesin-dextromethorphan 100 mg-10 mg/5 mL oral liquid: 10 milliliter(s) orally every 4 hours, As needed, Cough  Innerclean oral tablet: 2 tab(s) orally once a day (at bedtime)  Lexapro 5 mg oral tablet: 1 tab(s) orally once a day  metoprolol tartrate 25 mg oral tablet: 1 tab(s) orally 2 times a day  multivitamin: 1 tab(s) orally once a day  naproxen 500 mg oral delayed release tablet: 1 tab(s) orally 2 times a day, As Needed  -for bladder spasms    Admelog 100 units/mL injectable solution: 80 unit(s) injectable once a day for pump  amitriptyline 75 mg oral tablet: 1 tab(s) orally once a day (at bedtime)  ammonium lactate 12% topical cream: Apply topically to affected area 2 times a day  aspirin 81 mg oral tablet, chewable: 1 tab(s) orally once a day  ClearLax oral powder for reconstitution: 17 gram(s) orally once a day  cyclobenzaprine 10 mg oral tablet: 1 tab(s) orally once a day (at bedtime)  famotidine 40 mg oral tablet: 1 tab(s) orally 2 times a day  guaifenesin-dextromethorphan 100 mg-10 mg/5 mL oral liquid: 10 milliliter(s) orally every 4 hours, As needed, Cough  Innerclean oral tablet: 2 tab(s) orally once a day (at bedtime)  ivabradine 5 mg oral tablet: 1 tab(s) orally 2 times a day  Lexapro 5 mg oral tablet: 1 tab(s) orally once a day  metoprolol tartrate 25 mg oral tablet: 1 tab(s) orally 2 times a day  multivitamin: 1 tab(s) orally once a day  naproxen 500 mg oral delayed release tablet: 1 tab(s) orally 2 times a day, As Needed  -for bladder spasms

## 2022-01-17 NOTE — DISCHARGE NOTE PROVIDER - NSDCFUADDAPPT_GEN_ALL_CORE_FT
Please follow up with your outpatient neurologist to work up further your episodes of syncpe and whether they may be due to your current medication regimen.  Please follow up with your outpatient neurologist to work up further your episodes of syncope and whether they may be due to your current medication regimen. You have an appointment scheduled for neurology at the [facility] [place] clinic on 2/17/22    Please follow up with your cardiologist and psychiatrist recommended in this paperwork. You have appointments scheduled for psychiatry on 1/24/22.     Please call to make an appointment with Dr Schaeffer, the gastroenterologist for further work up of your possible IBD and liver lesions found on imaging. Please call her office to make sure you can be seen by her or one of her associates.

## 2022-01-17 NOTE — CHART NOTE - NSCHARTNOTEFT_GEN_A_CORE
Spoke with Madhu Conley, Infection , regarding patient's recent positive COVID swab. Patient originally tested positive on 12/31/2021. As patient is 10 days out from original positive swab, and patient is asymptomatic (on RA, afebrile), patient is not considered infectious and there is no need for patient - or patient's roommate in [facility] - to be isolated. Infection Control has followed up with bed-management regarding update. Spoke with Madhu Conley, Infection , regarding patient's recent positive COVID swab. Patient originally tested positive on 12/31/2021. As patient is 10 days out from original positive swab, and patient is asymptomatic (on RA, afebrile), patient is not considered infectious and there is no need for patient - or patient's roommate - to be isolated. Infection Control has followed up with bed-management regarding update.

## 2022-01-17 NOTE — DISCHARGE NOTE PROVIDER - PROVIDER TOKENS
PROVIDER:[TOKEN:[[id number]]] PROVIDER:[TOKEN:[[id number]]],PROVIDER:[TOKEN:[[id number]]] PROVIDER:[TOKEN:[[id number]]],PROVIDER:[TOKEN:[[id number]]],PROVIDER:[TOKEN:[[id number]],FOLLOWUP:[1 week]] PROVIDER:[TOKEN:[[id number]]],PROVIDER:[TOKEN:[[id number]]],PROVIDER:[TOKEN:[[id number]],FOLLOWUP:[1 week]],PROVIDER:[TOKEN:[[id number]],FOLLOWUP:[2 weeks]] PROVIDER:[TOKEN:[[id number]]],PROVIDER:[TOKEN:[[id number]]],PROVIDER:[TOKEN:[[id number]],FOLLOWUP:[1 week]],PROVIDER:[TOKEN:[[id number]],FOLLOWUP:[2 weeks]],PROVIDER:[TOKEN:[[id number]],FOLLOWUP:[1 week],ESTABLISHEDPATIENT:[T]]

## 2022-01-17 NOTE — DISCHARGE NOTE PROVIDER - HOSPITAL COURSE
37 yo F PMHx DM I since the age of 16 months) on an insulin pump complicated by Diabetic neuropathy , HTN, chronic back pain, migraine headaches, suspected IBS, and vulvodynia recently admitted to the hospital fo DKA and COVID, discharged yesterday, presents to the hospital due to syncope.    Syncope  - unclear etiology,  - trops neg x 3   - orthostatic VS negative in ED  - NSR on EKG  - Per Cardiology Dr. hopper: recommended, getting MCOT in office upon discharge > F/U with Dr. Hopper's office  - had normal stress and holter monitor several months ago, as per cardiology no further work up  - c/ corlanor, metoprolol  - Psychiatry Consulted: Not factitious disorder. Likely Anxiety related  - Neurology consult: Recommended cardiac workup. No EEG needed. From neuro standpoint, no further work up needed.     # Hx of COVID-19 PNA, resolved  - c/w Robitussin 10mL Q4 PRN Cough  - Currently COVID neg  - On RA, afebrile, no indication for steroid     Vulvodynia  - chronic  - UA sent, pyuria, will do short course of macrobid  - follow up outpatient with GI For MRI of liver lesions and colonoscopy to r/o IBD    Type I DM   Diabetic neuropathy  - pt had pump teaching as outpt, states she is comfortable with it  - c/w Lantus/lispro while in patient  - C/w insulin pump upon return to assisted living      Anxiety Disorder   - c/w lexapro 5 mg and amitriptyline 75mg HS     37 yo F PMHx DM I since the age of 16 months) on an insulin pump complicated by Diabetic neuropathy , HTN, chronic back pain, migraine headaches, suspected IBS, and vulvodynia recently admitted to the hospital fo DKA and COVID, discharged yesterday, presents to the hospital due to syncope. Patient reported syncopal episode twice at assisted living which was witnessed by her mother. Pt was at the assisted living when she collapsed, and her mother caught her; second time patient was in bathroom getting dressed and felt lightheaded and weak, and passed out. She reported having continuous chest pain, SOB and palpitations before the event. + dizzy, weakness, and nausea. Denies head injury, no seizure like activity, no urinary or bowel incontinence, no tongue biting.   Pt dad 3 similar syncopal episodes during her last admission, has also been having retrosternal chest pain for past 3 weeks during her past hospitalization.  Reports lower abdominal pain for past 3 weeks, not sexually active. Labs significant for glucose 262. Hgb 10.6, WBC 4.5. Na 132. UA negative. Trop negative x3. EKG QTc 492. NSR on EKG. No events on telemetry. CXR negative for cardiopulmonary disease. CTA negative for Pulm embolism. Patient had normal stress and holter monitor several months ago with Dr. Christian. Evaluated by EP this admission as well who recommended MCOT. MCOT provided today. She will follow up with Dr. Hopper's office for MCOT monitoring. She can continue with corlanor and metoprolol. Of note, evaluated by Psych this admission for possible factitious disorder given multiple symptoms with negative work up and readmission. Per psych team, likely anxiety reated. She can f/u outpatient with psych for anxiety. Neurlogy was also consulted and recommended no further workup from neurology stand point.   Today, she is stable for discharge.     Syncope  - unclear etiology,  - trops neg x 3   - orthostatic VS negative in ED  - NSR on EKG  - Per Cardiology Dr. hopper: recommended, getting MCOT in office upon discharge > F/U with Dr. Hopper's office  - had normal stress and holter monitor several months ago, as per cardiology no further work up  - c/ corlanor, metoprolol  - Psychiatry Consulted: Not factitious disorder. Likely Anxiety related  - Neurology consult: Recommended cardiac workup. No EEG needed. From neuro standpoint, no further work up needed.     # Hx of COVID-19 PNA, resolved  - c/w Robitussin 10mL Q4 PRN Cough  - Currently COVID neg  - On RA, afebrile, no indication for steroid     Vulvodynia  - chronic  - UA sent, pyuria, will do short course of macrobid  - follow up outpatient with GI For MRI of liver lesions and colonoscopy to r/o IBD    Type I DM   Diabetic neuropathy  - pt had pump teaching as outpt, states she is comfortable with it  - c/w Lantus/lispro while in patient  - C/w insulin pump upon return to assisted living      Anxiety Disorder   - c/w lexapro 5 mg and amitriptyline 75mg HS     37 yo F PMHx DM I since the age of 16 months) on an insulin pump complicated by Diabetic neuropathy , HTN, chronic back pain, migraine headaches, suspected IBS, and vulvodynia recently admitted to the hospital fo DKA and COVID, discharged yesterday, presents to the hospital due to syncope. Patient reported syncopal episode twice at assisted living which was witnessed by her mother. Pt was at the assisted living when she collapsed, and her mother caught her; second time patient was in bathroom getting dressed and felt lightheaded and weak, and passed out. She reported having continuous chest pain, SOB and palpitations before the event. + dizzy, weakness, and nausea. Denies head injury, no seizure like activity, no urinary or bowel incontinence, no tongue biting.   Pt had 3 similar syncopal episodes during her last admission, has also been having retrosternal chest pain for past 3 weeks during her past hospitalization.  Reports lower abdominal pain for past 3 weeks, not sexually active. Labs significant for glucose 262. Hgb 10.6, WBC 4.5. Na 132. UA negative. Trop negative x3. EKG QTc 492. NSR on EKG. No events on telemetry. CXR negative for cardiopulmonary disease. CTA negative for Pulm embolism. Patient had normal stress and holter monitor several months ago with Dr. Christian. Evaluated by EP this admission as well who recommended MCOT. MCOT provided. She will follow up with Dr. Hopper's office for MCOT monitoring. She can continue with metoprolol at current dose. Of note, evaluated by Psych this admission for possible factitious disorder given multiple symptoms with negative work up and readmission. Per psych team, likely anxiety reated. She can f/u outpatient with psych for anxiety. Neurlogy was also consulted and recommended no further workup from neurology stand point.     Syncope  - unclear etiology, orthostatic neg in ED but ortho static positive after most recent episode in hospital on 1/19; received liter bolus  - trops neg x 3   - NSR on EKG  - Per Cardiology Dr. hopper: recommended, getting MCOT upon discharge > F/U with Dr. Hopper's office  - had normal stress and holter monitor several months ago, as per cardiology no further work up  - c/w  metoprolol  - Psychiatry Consulted: Not factitious disorder. Likely Anxiety related. needs to f/u with psych o/p   - Neurology consult: Recommended cardiac workup. No EEG needed. From neuro standpoint, no further work up needed.     # Hx of COVID-19 PNA, resolved  - c/w Robitussin 10mL Q4 PRN Cough  - Currently COVID neg  - On RA, afebrile, no indication for steroid     Vulvodynia  - chronic  - UA sent, pyuria, will do short course of macrobid  - follow up outpatient with GI For MRI of liver lesions and colonoscopy to r/o IBD    Type I DM   Diabetic neuropathy  - pt had pump teaching as outpt, states she is comfortable with it  - c/w Lantus/lispro while in patient  - C/w insulin pump upon return to assisted living      Anxiety Disorder   - c/w lexapro 5 mg and amitriptyline 75mg HS

## 2022-01-17 NOTE — PROGRESS NOTE ADULT - SUBJECTIVE AND OBJECTIVE BOX
CHIEF COMPLAINT:    Patient is a 36y old  Female who presents with a chief complaint of syncope       INTERVAL HPI/OVERNIGHT EVENTS:    Patient seen and examined at bedside. No acute overnight events occurred.    ROS: Reports chronic abdominal pain. All other systems are negative.    Vital Signs:    T(F): 97.8 (22 @ 14:08), Max: 97.8 (22 @ 14:08)  HR: 88 (22 @ 14:08) (81 - 103)  BP: 135/64 (22 @ 14:08) (107/79 - 135/64)  RR: 18 (22 @ 14:08) (17 - 18)  SpO2: 99% (22 @ 19:28) (99% - 99%)  I&O's Summary    2022 07:01  -  2022 07:00  --------------------------------------------------------  IN: 100 mL / OUT: 0 mL / NET: 100 mL    2022 07:01  -  2022 16:30  --------------------------------------------------------  IN: 1330 mL / OUT: 1275 mL / NET: 55 mL      Daily     Daily Weight in k.3 (2022 04:54)  CAPILLARY BLOOD GLUCOSE      POCT Blood Glucose.: 126 mg/dL (2022 11:21)  POCT Blood Glucose.: 229 mg/dL (2022 07:34)  POCT Blood Glucose.: 134 mg/dL (2022 21:50)  POCT Blood Glucose.: 69 mg/dL (2022 20:38)      PHYSICAL EXAM:  GENERAL:  NAD  SKIN: No rashes or lesions  HEENT: Atraumatic. Normocephalic. Anicteric  NECK:  No JVD.   PULMONARY: Clear to ausculation bilaterally. No wheezing. No rales  CVS: Normal S1, S2. Regular rate and rhythm. No murmurs.  ABDOMEN/GI: Soft, Nontender, Nondistended; Bowel sounds are present  EXTREMITIES:  No edema B/L LE.  NEUROLOGIC:  No motor deficit.  PSYCH: Alert & oriented x 3, normal affect    Consultant(s) Notes Reviewed:  [x ] YES  [ ] NO  Care Discussed with Consultants/Other Providers [ x] YES  [ ] NO    LABS:            Serum Pro-Brain Natriuretic Peptide: <5 pg/mL (22 @ 11:47)          RADIOLOGY & ADDITIONAL TESTS:  Imaging or report Personally Reviewed:  [ ] YES  [ ] NO    Telemetry reviewed independently - no events    Medications:  Standing  amitriptyline Oral Tab/Cap - Peds 75 milliGRAM(s) Oral at bedtime  ammonium lactate 12% Lotion 1 Application(s) Topical two times a day  aspirin  chewable 81 milliGRAM(s) Oral daily  chlorhexidine 4% Liquid 1 Application(s) Topical <User Schedule>  dextrose 40% Gel 15 Gram(s) Oral once  dextrose 5%. 1000 milliLiter(s) IV Continuous <Continuous>  dextrose 5%. 1000 milliLiter(s) IV Continuous <Continuous>  dextrose 50% Injectable 25 Gram(s) IV Push once  dextrose 50% Injectable 12.5 Gram(s) IV Push once  dextrose 50% Injectable 25 Gram(s) IV Push once  enoxaparin Injectable 40 milliGRAM(s) SubCutaneous daily  escitalopram 5 milliGRAM(s) Oral daily  famotidine    Tablet 40 milliGRAM(s) Oral two times a day  glucagon  Injectable 1 milliGRAM(s) IntraMuscular once  insulin glargine Injectable (LANTUS) 40 Unit(s) SubCutaneous every morning  insulin lispro (ADMELOG) corrective regimen sliding scale   SubCutaneous Before meals and at bedtime  insulin lispro Injectable (ADMELOG) 15 Unit(s) SubCutaneous three times a day before meals  ivabradine 5 milliGRAM(s) Oral two times a day  metoprolol tartrate 25 milliGRAM(s) Oral every 8 hours  multivitamin Oral Tab/Cap - Peds 1 Tablet(s) Oral daily  naproxen 250 milliGRAM(s) Oral two times a day  nitrofurantoin monohydrate/macrocrystals (MACROBID) 100 milliGRAM(s) Oral two times a day    PRN Meds  acetaminophen     Tablet .. 650 milliGRAM(s) Oral every 6 hours PRN  cyclobenzaprine 10 milliGRAM(s) Oral at bedtime PRN  guaifenesin/dextromethorphan Oral Liquid 10 milliLiter(s) Oral every 4 hours PRN      Case discussed with resident  Care discussed with pt

## 2022-01-17 NOTE — DISCHARGE NOTE PROVIDER - NSDCFUSCHEDAPPT_GEN_ALL_CORE_FT
CRISTI MONTGOMERY ; 01/24/2022 ; NPP PSYCHIATRY  [place]  CRISTI MONTGOMERY ; 02/01/2022 ; NPP Endocrin [address] [facility] CRISTI Jacobs ; 02/17/2022 ; NPP Neurology [address] [facility] CRISTI Jacobs ; 03/14/2022 ; NPP Podiatry [address]

## 2022-01-17 NOTE — DISCHARGE NOTE PROVIDER - NPI NUMBER (FOR SYSADMIN USE ONLY) :
[[id number]] [[id number] [[id number]] [[id number]],[[id number]],[[id number]],[[id number]] [[id number]],[[id number]],[[id number]],[[id number]],[[id number]]

## 2022-01-18 LAB
ALBUMIN SERPL ELPH-MCNC: 4 G/DL — SIGNIFICANT CHANGE UP (ref 3.5–5.2)
ALP SERPL-CCNC: 105 U/L — SIGNIFICANT CHANGE UP (ref 30–115)
ALT FLD-CCNC: 52 U/L — HIGH (ref 0–41)
ANION GAP SERPL CALC-SCNC: 15 MMOL/L — HIGH (ref 7–14)
AST SERPL-CCNC: 28 U/L — SIGNIFICANT CHANGE UP (ref 0–41)
BASOPHILS # BLD AUTO: 0.04 K/UL — SIGNIFICANT CHANGE UP (ref 0–0.2)
BASOPHILS NFR BLD AUTO: 0.6 % — SIGNIFICANT CHANGE UP (ref 0–1)
BILIRUB SERPL-MCNC: <0.2 MG/DL — SIGNIFICANT CHANGE UP (ref 0.2–1.2)
BUN SERPL-MCNC: 12 MG/DL — SIGNIFICANT CHANGE UP (ref 10–20)
CALCIUM SERPL-MCNC: 9.1 MG/DL — SIGNIFICANT CHANGE UP (ref 8.5–10.1)
CHLORIDE SERPL-SCNC: 101 MMOL/L — SIGNIFICANT CHANGE UP (ref 98–110)
CO2 SERPL-SCNC: 22 MMOL/L — SIGNIFICANT CHANGE UP (ref 17–32)
CREAT SERPL-MCNC: 0.6 MG/DL — LOW (ref 0.7–1.5)
EOSINOPHIL # BLD AUTO: 0.23 K/UL — SIGNIFICANT CHANGE UP (ref 0–0.7)
EOSINOPHIL NFR BLD AUTO: 3.6 % — SIGNIFICANT CHANGE UP (ref 0–8)
GLUCOSE BLDC GLUCOMTR-MCNC: 121 MG/DL — HIGH (ref 70–99)
GLUCOSE BLDC GLUCOMTR-MCNC: 307 MG/DL — HIGH (ref 70–99)
GLUCOSE BLDC GLUCOMTR-MCNC: 332 MG/DL — HIGH (ref 70–99)
GLUCOSE BLDC GLUCOMTR-MCNC: 333 MG/DL — HIGH (ref 70–99)
GLUCOSE BLDC GLUCOMTR-MCNC: 336 MG/DL — HIGH (ref 70–99)
GLUCOSE BLDC GLUCOMTR-MCNC: 356 MG/DL — HIGH (ref 70–99)
GLUCOSE BLDC GLUCOMTR-MCNC: 381 MG/DL — HIGH (ref 70–99)
GLUCOSE BLDC GLUCOMTR-MCNC: 54 MG/DL — CRITICAL LOW (ref 70–99)
GLUCOSE BLDC GLUCOMTR-MCNC: 92 MG/DL — SIGNIFICANT CHANGE UP (ref 70–99)
GLUCOSE BLDC GLUCOMTR-MCNC: 97 MG/DL — SIGNIFICANT CHANGE UP (ref 70–99)
GLUCOSE SERPL-MCNC: 94 MG/DL — SIGNIFICANT CHANGE UP (ref 70–99)
HCT VFR BLD CALC: 33.9 % — LOW (ref 37–47)
HGB BLD-MCNC: 10.5 G/DL — LOW (ref 12–16)
IMM GRANULOCYTES NFR BLD AUTO: 0.6 % — HIGH (ref 0.1–0.3)
LYMPHOCYTES # BLD AUTO: 3.04 K/UL — SIGNIFICANT CHANGE UP (ref 1.2–3.4)
LYMPHOCYTES # BLD AUTO: 47.1 % — SIGNIFICANT CHANGE UP (ref 20.5–51.1)
MAGNESIUM SERPL-MCNC: 1.9 MG/DL — SIGNIFICANT CHANGE UP (ref 1.8–2.4)
MCHC RBC-ENTMCNC: 26.3 PG — LOW (ref 27–31)
MCHC RBC-ENTMCNC: 31 G/DL — LOW (ref 32–37)
MCV RBC AUTO: 84.8 FL — SIGNIFICANT CHANGE UP (ref 81–99)
MONOCYTES # BLD AUTO: 0.72 K/UL — HIGH (ref 0.1–0.6)
MONOCYTES NFR BLD AUTO: 11.1 % — HIGH (ref 1.7–9.3)
NEUTROPHILS # BLD AUTO: 2.39 K/UL — SIGNIFICANT CHANGE UP (ref 1.4–6.5)
NEUTROPHILS NFR BLD AUTO: 37 % — LOW (ref 42.2–75.2)
NRBC # BLD: 0 /100 WBCS — SIGNIFICANT CHANGE UP (ref 0–0)
PLATELET # BLD AUTO: 371 K/UL — SIGNIFICANT CHANGE UP (ref 130–400)
POTASSIUM SERPL-MCNC: 4.5 MMOL/L — SIGNIFICANT CHANGE UP (ref 3.5–5)
POTASSIUM SERPL-SCNC: 4.5 MMOL/L — SIGNIFICANT CHANGE UP (ref 3.5–5)
PROT SERPL-MCNC: 6.7 G/DL — SIGNIFICANT CHANGE UP (ref 6–8)
RBC # BLD: 4 M/UL — LOW (ref 4.2–5.4)
RBC # FLD: 14.5 % — SIGNIFICANT CHANGE UP (ref 11.5–14.5)
SODIUM SERPL-SCNC: 138 MMOL/L — SIGNIFICANT CHANGE UP (ref 135–146)
WBC # BLD: 6.46 K/UL — SIGNIFICANT CHANGE UP (ref 4.8–10.8)
WBC # FLD AUTO: 6.46 K/UL — SIGNIFICANT CHANGE UP (ref 4.8–10.8)

## 2022-01-18 PROCEDURE — 99233 SBSQ HOSP IP/OBS HIGH 50: CPT

## 2022-01-18 PROCEDURE — 95816 EEG AWAKE AND DROWSY: CPT | Mod: 26

## 2022-01-18 PROCEDURE — 99232 SBSQ HOSP IP/OBS MODERATE 35: CPT

## 2022-01-18 RX ORDER — INSULIN LISPRO 100/ML
10 VIAL (ML) SUBCUTANEOUS
Refills: 0 | Status: DISCONTINUED | OUTPATIENT
Start: 2022-01-18 | End: 2022-01-20

## 2022-01-18 RX ORDER — MAGNESIUM OXIDE 400 MG ORAL TABLET 241.3 MG
400 TABLET ORAL ONCE
Refills: 0 | Status: COMPLETED | OUTPATIENT
Start: 2022-01-18 | End: 2022-01-20

## 2022-01-18 RX ORDER — INSULIN GLARGINE 100 [IU]/ML
30 INJECTION, SOLUTION SUBCUTANEOUS EVERY MORNING
Refills: 0 | Status: DISCONTINUED | OUTPATIENT
Start: 2022-01-18 | End: 2022-01-20

## 2022-01-18 RX ADMIN — Medication 1 TABLET(S): at 12:40

## 2022-01-18 RX ADMIN — Medication 4: at 21:53

## 2022-01-18 RX ADMIN — IVABRADINE 5 MILLIGRAM(S): 7.5 TABLET, FILM COATED ORAL at 17:17

## 2022-01-18 RX ADMIN — Medication 75 MILLIGRAM(S): at 21:51

## 2022-01-18 RX ADMIN — Medication 25 MILLIGRAM(S): at 06:16

## 2022-01-18 RX ADMIN — CHLORHEXIDINE GLUCONATE 1 APPLICATION(S): 213 SOLUTION TOPICAL at 05:36

## 2022-01-18 RX ADMIN — Medication 100 MILLIGRAM(S): at 06:16

## 2022-01-18 RX ADMIN — FAMOTIDINE 40 MILLIGRAM(S): 10 INJECTION INTRAVENOUS at 06:16

## 2022-01-18 RX ADMIN — Medication 25 MILLIGRAM(S): at 13:35

## 2022-01-18 RX ADMIN — Medication 4: at 12:39

## 2022-01-18 RX ADMIN — IVABRADINE 5 MILLIGRAM(S): 7.5 TABLET, FILM COATED ORAL at 06:16

## 2022-01-18 RX ADMIN — INSULIN GLARGINE 40 UNIT(S): 100 INJECTION, SOLUTION SUBCUTANEOUS at 08:36

## 2022-01-18 RX ADMIN — ESCITALOPRAM OXALATE 5 MILLIGRAM(S): 10 TABLET, FILM COATED ORAL at 12:40

## 2022-01-18 RX ADMIN — Medication 15 UNIT(S): at 12:39

## 2022-01-18 RX ADMIN — ENOXAPARIN SODIUM 40 MILLIGRAM(S): 100 INJECTION SUBCUTANEOUS at 12:40

## 2022-01-18 RX ADMIN — Medication 250 MILLIGRAM(S): at 17:32

## 2022-01-18 RX ADMIN — Medication 81 MILLIGRAM(S): at 12:40

## 2022-01-18 RX ADMIN — Medication 250 MILLIGRAM(S): at 06:16

## 2022-01-18 RX ADMIN — Medication 25 MILLIGRAM(S): at 21:50

## 2022-01-18 RX ADMIN — Medication 250 MILLIGRAM(S): at 17:18

## 2022-01-18 RX ADMIN — FAMOTIDINE 40 MILLIGRAM(S): 10 INJECTION INTRAVENOUS at 17:32

## 2022-01-18 RX ADMIN — Medication 650 MILLIGRAM(S): at 11:09

## 2022-01-18 RX ADMIN — Medication 1 APPLICATION(S): at 05:36

## 2022-01-18 RX ADMIN — Medication 1 APPLICATION(S): at 17:16

## 2022-01-18 RX ADMIN — Medication 650 MILLIGRAM(S): at 11:10

## 2022-01-18 RX ADMIN — Medication 100 MILLIGRAM(S): at 17:18

## 2022-01-18 NOTE — PROGRESS NOTE BEHAVIORAL HEALTH - NSBHCHARTREVIEWVS_PSY_A_CORE FT
Vital Signs Last 24 Hrs  T(C): 36.3 (18 Jan 2022 13:41), Max: 37 (17 Jan 2022 20:06)  T(F): 97.3 (18 Jan 2022 13:41), Max: 98.6 (17 Jan 2022 20:06)  HR: 82 (18 Jan 2022 13:41) (82 - 86)  BP: 124/60 (18 Jan 2022 13:41) (124/60 - 153/81)  BP(mean): --  RR: 18 (18 Jan 2022 13:41) (18 - 18)  SpO2: 97% (17 Jan 2022 20:06) (97% - 97%)

## 2022-01-18 NOTE — PROGRESS NOTE BEHAVIORAL HEALTH - NSBHCHARTREVIEWLAB_PSY_A_CORE FT
10.5   6.46  )-----------( 371      ( 18 Jan 2022 06:00 )             33.9       01-18    138  |  101  |  12  ----------------------------<  94  4.5   |  22  |  0.6<L>    Ca    9.1      18 Jan 2022 06:00  Mg     1.9     01-18    TPro  6.7  /  Alb  4.0  /  TBili  <0.2  /  DBili  x   /  AST  28  /  ALT  52<H>  /  AlkPhos  105  01-18

## 2022-01-18 NOTE — PROGRESS NOTE BEHAVIORAL HEALTH - NSBHCONSULTFOLLOWAFTERCARE_PSY_A_CORE FT
Upon discharge, patient can follow up with her outpatient psychiatrist Dr Gallegos at  [facility], [address] [place] [address], and Phone number: [phone] on Monday January 24th , 2022.

## 2022-01-18 NOTE — PROGRESS NOTE BEHAVIORAL HEALTH - NSBHCHARTREVIEWIMAGING_PSY_A_CORE FT
EEG  Consistent with non-epileptic event. A normal EEG does not exclude the   possibility of seizure disorder. Clinical correlation is recommended.   1/18/22

## 2022-01-18 NOTE — PROGRESS NOTE BEHAVIORAL HEALTH - SUMMARY
Ms Mei is a 36 year old woman with a history of Depression and anxiety who was admitted to the medical floor for the evaluation of Syncope . Patient was discharged yesterday following a medical admission for COVID pneumonia and Diabetes Keto acidosis.    Psychiatry consult was initially called for a mental health evaluation as it was unclear if her syncope was secondary to an acute psychiatric cause. At that time, it was determined that there was no acute psychiatric cause for her syncope.    The psychiatry team was re-called because patient was said to have Non epileptiform seizure like activity.  The cause of patient's behavior is unclear at this time. It is possible that it is secondary to an anxiety disorder , however patient does not appear to have any acute symptoms of anxiety or panic attacks. in addition , she does not appear to have acute symptoms of psychosis, alvina or depression. She denies having current suicidal ideations, intent or plan.  At this time, patient is not considered an imminent danger to himself or others and does not need inpatient psychiatric hospitalization. We agree with the Neurology consult to evaluate for the cause of her syncope  and re-evaluation of her medications . We recommend that patient follows up with her neurologist on out patient basis . Patient can continue Lexapro 5mg p.o daily  with the plan to follow up with her new outpatient psychiatrist Dr Gallegos at  [facility], [address] [place] [address], and Phone number: [phone]

## 2022-01-18 NOTE — PROGRESS NOTE ADULT - SUBJECTIVE AND OBJECTIVE BOX
CHIEF COMPLAINT:  Patient is a 36y old  Female who presents with a chief complaint of syncope (17 Jan 2022 16:29)      INTERVAL HISTORY/OVERNIGHT EVENTS:  1/18  -rapid response for fall in bathroom, pt shaking uncontrollably, bilateral UE; on exam less likely seizure; stat EEG ruled out epileptiform activity  -f/u psych for medication adjustment (ho depression and anxiety)    ======================  MEDICATIONS:  amitriptyline Oral Tab/Cap - Peds 75 milliGRAM(s) Oral at bedtime  ammonium lactate 12% Lotion 1 Application(s) Topical two times a day  aspirin  chewable 81 milliGRAM(s) Oral daily  chlorhexidine 4% Liquid 1 Application(s) Topical <User Schedule>  dextrose 40% Gel 15 Gram(s) Oral once  dextrose 50% Injectable 25 Gram(s) IV Push once  dextrose 50% Injectable 12.5 Gram(s) IV Push once  dextrose 50% Injectable 25 Gram(s) IV Push once  enoxaparin Injectable 40 milliGRAM(s) SubCutaneous daily  escitalopram 5 milliGRAM(s) Oral daily  famotidine    Tablet 40 milliGRAM(s) Oral two times a day  glucagon  Injectable 1 milliGRAM(s) IntraMuscular once  insulin glargine Injectable (LANTUS) 40 Unit(s) SubCutaneous every morning  insulin lispro (ADMELOG) corrective regimen sliding scale   SubCutaneous Before meals and at bedtime  insulin lispro Injectable (ADMELOG) 15 Unit(s) SubCutaneous three times a day before meals  ivabradine 5 milliGRAM(s) Oral two times a day  metoprolol tartrate 25 milliGRAM(s) Oral every 8 hours  multivitamin Oral Tab/Cap - Peds 1 Tablet(s) Oral daily  naproxen 250 milliGRAM(s) Oral two times a day  nitrofurantoin monohydrate/macrocrystals (MACROBID) 100 milliGRAM(s) Oral two times a day    DRIPS:  dextrose 5%. 1000 milliLiter(s) (50 mL/Hr) IV Continuous <Continuous>  dextrose 5%. 1000 milliLiter(s) (100 mL/Hr) IV Continuous <Continuous>    PRN:       ======================  PHYSICAL EXAMINATION:  GEN:  nad.   HEENT:  eomi. ncat  PULM:  b/l bs.  clear.  no wheezing. no crackles or rales.   CARD: regular. s1, s2.  no murmurs.   ABD: +bs. ntnd  EXT:  no new rashes.  no pitting edema b/l .   NEURO:  no new focal deficits.   ======================  OBJECTIVE:        VS:  T(F): 96 (01-18 @ 05:46), Max: 98.6 (01-17 @ 20:06)  HR: 86 (01-18 @ 05:46) (85 - 88)  BP: 153/81 (01-18 @ 05:46) (125/59 - 153/81)  RR: 18 (01-17 @ 20:06) (18 - 18)  SpO2: 97% (01-17 @ 20:06) (97% - 97%)  CVP(mm Hg): --  CO: --  CI: --  PA: --  PCWP: --    I/O:      01-15 @ 07:01  -  01-16 @ 07:00  --------------------------------------------------------  IN: 960 mL / OUT: 1000 mL / NET: -40 mL    01-16 @ 07:01 - 01-17 @ 07:00  --------------------------------------------------------  IN: 100 mL / OUT: 0 mL / NET: 100 mL    01-17 @ 07:01  -  01-18 @ 07:00  --------------------------------------------------------  IN: 2090 mL / OUT: 1525 mL / NET: 565 mL    01-18 @ 07:01 - 01-18 @ 10:54  --------------------------------------------------------  IN: 500 mL / OUT: 0 mL / NET: 500 mL        Weight trend:  Weight (kg): 76.4 (01-14)    ======================    LABS:                          10.5   6.46  )-----------( 371      ( 18 Jan 2022 06:00 )             33.9     01-18    138  |  101  |  12  ----------------------------<  94  4.5   |  22  |  0.6<L>    Ca    9.1      18 Jan 2022 06:00  Mg     1.9     01-18    TPro  6.7  /  Alb  4.0  /  TBili  <0.2  /  DBili  x   /  AST  28  /  ALT  52<H>  /  AlkPhos  105  01-18    LIVER FUNCTIONS - ( 18 Jan 2022 06:00 )  Alb: 4.0 g/dL / Pro: 6.7 g/dL / ALK PHOS: 105 U/L / ALT: 52 U/L / AST: 28 U/L / GGT: x                   Serum Pro-Brain Natriuretic Peptide: <5 pg/mL (01-13)        Cultures:    Culture - Urine (collected 01-13)  Source: Clean Catch Clean Catch (Midstream)  Final Report:    >100,000 CFU/ml Streptococcus agalactiae (Group B) isolated    Group B streptococci are susceptible to ampicillin,    penicillin and cefazolin, but may be resistant to    erythromycin and clindamycin.    Recommendations for intrapartum prophylaxis for Group B    streptococci are penicillin or ampicillin.    <10,000 CFU/ml Normal Urogenital susie present           CHIEF COMPLAINT:  Patient is a 36y old  Female who presents with a chief complaint of syncope (17 Jan 2022 16:29)      INTERVAL HISTORY/OVERNIGHT EVENTS:  1/18  -rapid response for fall in bathroom, pt shaking uncontrollably, bilateral UE; on exam less likely seizure; stat EEG ruled out epileptiform activity  -f/u psych for medication adjustment (ho depression and anxiety)    ======================  MEDICATIONS:  amitriptyline Oral Tab/Cap - Peds 75 milliGRAM(s) Oral at bedtime  ammonium lactate 12% Lotion 1 Application(s) Topical two times a day  aspirin  chewable 81 milliGRAM(s) Oral daily  chlorhexidine 4% Liquid 1 Application(s) Topical <User Schedule>  dextrose 40% Gel 15 Gram(s) Oral once  dextrose 50% Injectable 25 Gram(s) IV Push once  dextrose 50% Injectable 12.5 Gram(s) IV Push once  dextrose 50% Injectable 25 Gram(s) IV Push once  enoxaparin Injectable 40 milliGRAM(s) SubCutaneous daily  escitalopram 5 milliGRAM(s) Oral daily  famotidine    Tablet 40 milliGRAM(s) Oral two times a day  glucagon  Injectable 1 milliGRAM(s) IntraMuscular once  insulin glargine Injectable (LANTUS) 40 Unit(s) SubCutaneous every morning  insulin lispro (ADMELOG) corrective regimen sliding scale   SubCutaneous Before meals and at bedtime  insulin lispro Injectable (ADMELOG) 15 Unit(s) SubCutaneous three times a day before meals  ivabradine 5 milliGRAM(s) Oral two times a day  metoprolol tartrate 25 milliGRAM(s) Oral every 8 hours  multivitamin Oral Tab/Cap - Peds 1 Tablet(s) Oral daily  naproxen 250 milliGRAM(s) Oral two times a day  nitrofurantoin monohydrate/macrocrystals (MACROBID) 100 milliGRAM(s) Oral two times a day    DRIPS:  dextrose 5%. 1000 milliLiter(s) (50 mL/Hr) IV Continuous <Continuous>  dextrose 5%. 1000 milliLiter(s) (100 mL/Hr) IV Continuous <Continuous>    PRN:       ======================  PHYSICAL EXAMINATION:  GEN:  nad.   HEENT:  eomi. ncat  PULM:  b/l bs.  clear.  no wheezing. no crackles or rales.   CARD: regular. s1, s2.  no murmurs.   ABD: +bs. ntnd  EXT:  no new rashes.  no pitting edema b/l .   NEURO:  cn 2-12 intact, normal strength, normal sensation  ======================  OBJECTIVE:        VS:  T(F): 96 (01-18 @ 05:46), Max: 98.6 (01-17 @ 20:06)  HR: 86 (01-18 @ 05:46) (85 - 88)  BP: 153/81 (01-18 @ 05:46) (125/59 - 153/81)  RR: 18 (01-17 @ 20:06) (18 - 18)  SpO2: 97% (01-17 @ 20:06) (97% - 97%)  CVP(mm Hg): --  CO: --  CI: --  PA: --  PCWP: --    I/O:      01-15 @ 07:01  -  01-16 @ 07:00  --------------------------------------------------------  IN: 960 mL / OUT: 1000 mL / NET: -40 mL    01-16 @ 07:01 - 01-17 @ 07:00  --------------------------------------------------------  IN: 100 mL / OUT: 0 mL / NET: 100 mL    01-17 @ 07:01 - 01-18 @ 07:00  --------------------------------------------------------  IN: 2090 mL / OUT: 1525 mL / NET: 565 mL    01-18 @ 07:01 - 01-18 @ 10:54  --------------------------------------------------------  IN: 500 mL / OUT: 0 mL / NET: 500 mL        Weight trend:  Weight (kg): 76.4 (01-14)    ======================    LABS:                          10.5   6.46  )-----------( 371      ( 18 Jan 2022 06:00 )             33.9     01-18    138  |  101  |  12  ----------------------------<  94  4.5   |  22  |  0.6<L>    Ca    9.1      18 Jan 2022 06:00  Mg     1.9     01-18    TPro  6.7  /  Alb  4.0  /  TBili  <0.2  /  DBili  x   /  AST  28  /  ALT  52<H>  /  AlkPhos  105  01-18    LIVER FUNCTIONS - ( 18 Jan 2022 06:00 )  Alb: 4.0 g/dL / Pro: 6.7 g/dL / ALK PHOS: 105 U/L / ALT: 52 U/L / AST: 28 U/L / GGT: x                   Serum Pro-Brain Natriuretic Peptide: <5 pg/mL (01-13)        Cultures:    Culture - Urine (collected 01-13)  Source: Clean Catch Clean Catch (Midstream)  Final Report:    >100,000 CFU/ml Streptococcus agalactiae (Group B) isolated    Group B streptococci are susceptible to ampicillin,    penicillin and cefazolin, but may be resistant to    erythromycin and clindamycin.    Recommendations for intrapartum prophylaxis for Group B    streptococci are penicillin or ampicillin.    <10,000 CFU/ml Normal Urogenital susie present

## 2022-01-18 NOTE — PROGRESS NOTE BEHAVIORAL HEALTH - NSBHFUPINTERVALHXFT_PSY_A_CORE
Patient was seen and interviewed.  Upon approach, patient was observed to be on her bed , sleeping, easily work up when we called her name. She reports that she remembers getting up from bed to go to the bathroom and was later told that she was found on the floor appearing to have seizure like activity. she reports that she noting precipitated the event, and she had no feelings of dizziness or anxiety prior to going to the bathroom. she reports that she was informed by the team that she was observed to be shaking. patient reports that she has had similar episodes in the past, last one was during her last discharge from the hospital. She however reports that she had an episode of dizziness prior in that episode.   She reports that she follows with a neurologist on outpatient basis for the management of her migraines and peripheral neuropathy. She reports that she is on Amytriptalin and metoprolol for over 6 years. patyient states ' someone said , it could be the medication". Writer informed patient that this is a discussion she should have with her neurologist and medical doctors however , it her syncopal episodes are secondary to the medications, it is likely she would have more frequent episodes and they would have started earlier.   Patient denies having any acute symptoms of depression, alvina or psychosis , she denies having current suicidal thoughts , intent or plan.   According to the nurse taking care of patient, patient has found on the floor after an alarm shubham off . She reports that patient was observed to be blinking and later shaking and was not talking . she reports that patient was not orthostatic and was not hypoglycemic. She report that the whole episode lasted about 30mins after which patient started talking and interacting appropriately with staff .

## 2022-01-18 NOTE — PROVIDER CONTACT NOTE (FALL NOTIFICATION) - SITUATION
Bathroom alarm going off, Patient found on bathroom floor  as per patient roommate, pt. has been getting up without calling for assistance and turning off alarm by herself throughout the night

## 2022-01-18 NOTE — PROGRESS NOTE ADULT - SUBJECTIVE AND OBJECTIVE BOX
CHIEF COMPLAINT:    Patient is a 36y old  Female who presents with a chief complaint of syncope     INTERVAL HPI/OVERNIGHT EVENTS:    Patient seen and examined at bedside. pt had rapid response this morning    ROS: Reports feeling anxious. All other systems are negative.    Vital Signs:    T(F): 96 (22 @ 05:46), Max: 98.6 (22 @ 20:06)  HR: 86 (22 @ 05:46) (85 - 88)  BP: 153/81 (22 @ 05:46) (125/59 - 153/81)  RR: 18 (22 @ 20:06) (18 - 18)  SpO2: 97% (22 @ 20:06) (97% - 97%)  I&O's Summary    2022 07:01  -  2022 07:00  --------------------------------------------------------  IN: 2090 mL / OUT: 1525 mL / NET: 565 mL    2022 07:01  -  2022 13:22  --------------------------------------------------------  IN: 500 mL / OUT: 0 mL / NET: 500 mL      Daily     Daily Weight in k.3 (2022 05:46)  CAPILLARY BLOOD GLUCOSE      POCT Blood Glucose.: 307 mg/dL (2022 11:45)  POCT Blood Glucose.: 97 mg/dL (2022 08:20)  POCT Blood Glucose.: 92 mg/dL (2022 07:29)  POCT Blood Glucose.: 146 mg/dL (2022 21:23)  POCT Blood Glucose.: 69 mg/dL (2022 19:30)  POCT Blood Glucose.: 105 mg/dL (2022 16:48)      PHYSICAL EXAM:  GENERAL:  NAD  SKIN: No rashes or lesions  HEENT: Atraumatic. Normocephalic. Anicteric  NECK:  No JVD.   PULMONARY: Clear to ausculation bilaterally. No wheezing. No rales  CVS: Normal S1, S2. Regular rate and rhythm. No murmurs.  ABDOMEN/GI: Soft, Nontender, Nondistended; Bowel sounds are present  EXTREMITIES:  No edema B/L LE.  NEUROLOGIC:  No motor deficit.  PSYCH: Alert & oriented x 3, normal affect    Consultant(s) Notes Reviewed:  [x ] YES  [ ] NO  Care Discussed with Consultants/Other Providers [ x] YES  [ ] NO - elipepsy, psychiatry    LABS:                        10.5   6.46  )-----------( 371      ( 2022 06:00 )             33.9         138  |  101  |  12  ----------------------------<  94  4.5   |  22  |  0.6<L>    Ca    9.1      2022 06:00  Mg     1.9         TPro  6.7  /  Alb  4.0  /  TBili  <0.2  /  DBili  x   /  AST  28  /  ALT  52<H>  /  AlkPhos  105        Serum Pro-Brain Natriuretic Peptide: <5 pg/mL (22 @ 11:47)      RADIOLOGY & ADDITIONAL TESTS:  Imaging or report Personally Reviewed:  [ ] YES  [ ] NO    Telemetry reviewed independently - no events    Medications:  Standing  amitriptyline Oral Tab/Cap - Peds 75 milliGRAM(s) Oral at bedtime  ammonium lactate 12% Lotion 1 Application(s) Topical two times a day  aspirin  chewable 81 milliGRAM(s) Oral daily  chlorhexidine 4% Liquid 1 Application(s) Topical <User Schedule>  dextrose 40% Gel 15 Gram(s) Oral once  dextrose 5%. 1000 milliLiter(s) IV Continuous <Continuous>  dextrose 5%. 1000 milliLiter(s) IV Continuous <Continuous>  dextrose 50% Injectable 25 Gram(s) IV Push once  dextrose 50% Injectable 12.5 Gram(s) IV Push once  dextrose 50% Injectable 25 Gram(s) IV Push once  enoxaparin Injectable 40 milliGRAM(s) SubCutaneous daily  escitalopram 5 milliGRAM(s) Oral daily  famotidine    Tablet 40 milliGRAM(s) Oral two times a day  glucagon  Injectable 1 milliGRAM(s) IntraMuscular once  insulin glargine Injectable (LANTUS) 40 Unit(s) SubCutaneous every morning  insulin lispro (ADMELOG) corrective regimen sliding scale   SubCutaneous Before meals and at bedtime  insulin lispro Injectable (ADMELOG) 15 Unit(s) SubCutaneous three times a day before meals  ivabradine 5 milliGRAM(s) Oral two times a day  magnesium oxide 400 milliGRAM(s) Oral once  metoprolol tartrate 25 milliGRAM(s) Oral every 8 hours  multivitamin Oral Tab/Cap - Peds 1 Tablet(s) Oral daily  naproxen 250 milliGRAM(s) Oral two times a day  nitrofurantoin monohydrate/macrocrystals (MACROBID) 100 milliGRAM(s) Oral two times a day    PRN Meds  acetaminophen     Tablet .. 650 milliGRAM(s) Oral every 6 hours PRN  cyclobenzaprine 10 milliGRAM(s) Oral at bedtime PRN  guaifenesin/dextromethorphan Oral Liquid 10 milliLiter(s) Oral every 4 hours PRN      Case discussed with resident  Care discussed with pt

## 2022-01-18 NOTE — PROGRESS NOTE ADULT - ASSESSMENT
36-year-old, F, patient, PMHx: Type I DM ( since the age of 16 months) on an insulin pump complicated by Diabetic neuropathy , HTN, chronic back pain, migraine headaches, recently admitted to the hospital fo DKA and COVID, discharged yesterday, presents to the hospital due to syncope.    # Syncope x2,  - trops neg x 3   - orthostatic VS negative in ED  - s/p 2L LR bolus in ED, c/w IV hydration  - ECG (01.13.22 @ 12:13): Line Sinus rhythm with short IN; Prolonged QT  - EP consult appreciated: Patient had outpatient workup with Dr. Hopper. F/U records. Recommending MCOT on d/c and CCTA. Metoprolol increased. Started on Corlanor.   - obtain records from patient's cardiologist (Dr. Christian), check results of stress echo and holter monitor( pt reports it was done in nov. 2021)  - Started on corlanor 5mg po bid as per EP  - Increased metoprolol to 25mg q8hrs as per EP  - f/u TSH, folate/B12   - Fall precautions.   - Psychiatry Consulted: Not factitious disorder. Likely Anxiety related o/p f/u  - Neurology consult: Recommended cardiac workup. No EEG needed. From neuro standpoint, no further work up needed.   - Cardiology Dr. Hopper: No further cardiology work up this admission. No CCTA. Can f/u outpatient for MCOT with Dr. Hopper's office.   -mcot placed 1/17  - D/C tele      #pseudoseizures/Psychogenic nonepileptic seizures  -seizure like episode this am  -no eye rolling, no flaccid muscle tone, no tongue biting, response to pain stimuli  -EEG done during episode, no epileptiform activity  -psych f/u; no acute intervention, no acute barrier to d/c  -f/u with Dr Gallegos o/p (psych) and o/p neurologist    # Abdominal Pain, possibly IBS  # mild LFTs elevation  - Seen by GI during last admission   CT Abdomen and Pelvis w/ IV Cont 12/2/21:  No acute intra-abdominal or pelvic pathology is identified  - RUQ US no significant findings  - monitor LFTs > downtrending   - follow up outpatient with GI For MRI of liver lesions and colonoscopy to r/o IBD    # Type I DM   # Diabetic neuropathy  - pt is on insulin pump (insulin supply will last for 3 days in the pump. May need to switch to subQ when insulin running out from the pump, or get non-formulary from pharmacy)  - patient discharged on 1/12 with pump.   - Last admission: Seen by endocrine. Was on Lantus 40 am, 12 pm, and 10 lispro TID before meals  - Lantus 40 am, Lispro 15 TID this admission   >>sugars uncontrolled today  - Patient needs instruction on insulin pump    # Hx of COVID-19 PNA, resolved  - c/w Robitussin 10mL Q4 PRN Cough  - Currently COVID neg  - On RA, afebrile, no indication for steroid   # Anxiety Disorder   - c/w lexapro 5 mg and amitriptyline 75mg HS  - o/p psych follow up with Dr Gallegos    Diet: CC  Activity: as tolerated  DVT Prophylaxis: lovenox subQ  GI Prophylaxis: famotidine  CHG Order  Code Status: full code  Disposition: From [facility], [facility], d/c tomorrow    Progress note handoff:  - No further workup for syncope/ chest pain. F/u with Dr. Hopper outpatient for MCOT    - No further neuro workup; f/u with neurologist o/p  - Gi f/u outpatient for IBD  - D/C Wednesday back to assisted living ([facility])

## 2022-01-19 ENCOUNTER — APPOINTMENT (OUTPATIENT)
Dept: INTERNAL MEDICINE | Facility: CLINIC | Age: 37
End: 2022-01-19

## 2022-01-19 LAB
GLUCOSE BLDC GLUCOMTR-MCNC: 129 MG/DL — HIGH (ref 70–99)
GLUCOSE BLDC GLUCOMTR-MCNC: 133 MG/DL — HIGH (ref 70–99)
GLUCOSE BLDC GLUCOMTR-MCNC: 205 MG/DL — HIGH (ref 70–99)
GLUCOSE BLDC GLUCOMTR-MCNC: 278 MG/DL — HIGH (ref 70–99)
GLUCOSE BLDC GLUCOMTR-MCNC: 54 MG/DL — CRITICAL LOW (ref 70–99)
SARS-COV-2 RNA SPEC QL NAA+PROBE: DETECTED

## 2022-01-19 PROCEDURE — 99232 SBSQ HOSP IP/OBS MODERATE 35: CPT

## 2022-01-19 RX ORDER — SODIUM CHLORIDE 9 MG/ML
1000 INJECTION, SOLUTION INTRAVENOUS ONCE
Refills: 0 | Status: COMPLETED | OUTPATIENT
Start: 2022-01-19 | End: 2022-01-19

## 2022-01-19 RX ADMIN — FAMOTIDINE 40 MILLIGRAM(S): 10 INJECTION INTRAVENOUS at 05:13

## 2022-01-19 RX ADMIN — Medication 250 MILLIGRAM(S): at 05:14

## 2022-01-19 RX ADMIN — Medication 3: at 22:58

## 2022-01-19 RX ADMIN — Medication 1 APPLICATION(S): at 06:31

## 2022-01-19 RX ADMIN — Medication 650 MILLIGRAM(S): at 21:09

## 2022-01-19 RX ADMIN — ESCITALOPRAM OXALATE 5 MILLIGRAM(S): 10 TABLET, FILM COATED ORAL at 12:05

## 2022-01-19 RX ADMIN — ENOXAPARIN SODIUM 40 MILLIGRAM(S): 100 INJECTION SUBCUTANEOUS at 12:06

## 2022-01-19 RX ADMIN — Medication 1 APPLICATION(S): at 17:11

## 2022-01-19 RX ADMIN — Medication 25 MILLIGRAM(S): at 13:35

## 2022-01-19 RX ADMIN — Medication 650 MILLIGRAM(S): at 22:37

## 2022-01-19 RX ADMIN — IVABRADINE 5 MILLIGRAM(S): 7.5 TABLET, FILM COATED ORAL at 05:16

## 2022-01-19 RX ADMIN — Medication 1 TABLET(S): at 12:05

## 2022-01-19 RX ADMIN — Medication 75 MILLIGRAM(S): at 21:16

## 2022-01-19 RX ADMIN — Medication 10 UNIT(S): at 08:10

## 2022-01-19 RX ADMIN — Medication 100 MILLIGRAM(S): at 05:15

## 2022-01-19 RX ADMIN — Medication 81 MILLIGRAM(S): at 12:05

## 2022-01-19 RX ADMIN — Medication 250 MILLIGRAM(S): at 06:30

## 2022-01-19 RX ADMIN — Medication 2: at 08:11

## 2022-01-19 RX ADMIN — SODIUM CHLORIDE 2000 MILLILITER(S): 9 INJECTION, SOLUTION INTRAVENOUS at 22:22

## 2022-01-19 RX ADMIN — Medication 250 MILLIGRAM(S): at 17:10

## 2022-01-19 RX ADMIN — Medication 10 UNIT(S): at 12:05

## 2022-01-19 RX ADMIN — FAMOTIDINE 40 MILLIGRAM(S): 10 INJECTION INTRAVENOUS at 17:11

## 2022-01-19 RX ADMIN — INSULIN GLARGINE 30 UNIT(S): 100 INJECTION, SOLUTION SUBCUTANEOUS at 08:29

## 2022-01-19 RX ADMIN — IVABRADINE 5 MILLIGRAM(S): 7.5 TABLET, FILM COATED ORAL at 17:11

## 2022-01-19 RX ADMIN — CYCLOBENZAPRINE HYDROCHLORIDE 10 MILLIGRAM(S): 10 TABLET, FILM COATED ORAL at 21:12

## 2022-01-19 RX ADMIN — Medication 25 MILLIGRAM(S): at 05:14

## 2022-01-19 RX ADMIN — Medication 25 MILLIGRAM(S): at 21:08

## 2022-01-19 NOTE — PROVIDER CONTACT NOTE (FALL NOTIFICATION) - BACKGROUND
Patient admitted for syncopal event
Patient found on the bathroom floor near the door. Patient felt dizzy, fell to the floor and pulled on the bathroom call light. PCA + RN went to answer call light and patient was already on the floor.

## 2022-01-19 NOTE — PROGRESS NOTE ADULT - ASSESSMENT
36-year-old, F, patient, PMHx: Type I DM ( since the age of 16 months) on an insulin pump complicated by Diabetic neuropathy , HTN, chronic back pain, migraine headaches, recently admitted to the hospital fo DKA and COVID, discharged yesterday, presents to the hospital due to syncope.    # Syncope x4  # generalized tremors secondary to pscyhogenic nonepileptic seizures  -two episodes prior to admission, two episodes on 1/18 1/19  - trops neg x 3, orthostatic neg in ED, received IV fluids; orthostatics positive on 1/19 will receive 1L bolus; please measure orthostatics 1/20 prior to d/c  -pt had severe diarrhea, recent admission for dka recently; episodes of presyncope likely combination of orthostatics and pseudoseizures  - ECG (01.13.22 @ 12:13): Line Sinus rhythm with short OR; Prolonged QT  - Increased metoprolol to 25mg q8hrs as per EP; do not increase any further per Dr Ovalles  - Sees Dr Christian o/p, had workup done; MCOT placed this admission will f/u with him o/p for MCOT management  - Psychiatry Consulted: Not factitious disorder. Likely Anxiety related o/p f/u with Dr Gallegos  - Neurology consult: Recommended cardiac workup. No EEG needed. From neuro standpoint, no further work up needed. Should f/u with o/p neurologist    #pseudoseizures/Psychogenic nonepileptic seizures  -seizure like episode this am  -no eye rolling, no flaccid muscle tone, no tongue biting, response to pain stimuli, no post ictal period  -EEG done during episode, no epileptiform activity  -pt with hx of suicide attempts, labile emotional behaviors; depression/anxiety  -when interviewed by psych pt denied any anxiety or depression  -f/u with Dr Gallegos o/p (psych) and o/p neurologist    # Type I DM   # Diabetic neuropathy  - pt is on insulin pump (insulin supply will last for 3 days in the pump. May need to switch to subQ when insulin running out from the pump, or get non-formulary from pharmacy)  - patient discharged on 1/12 with pump.   - Last admission: Seen by endocrine. Was on Lantus 40 am, 12 pm, and 10 lispro TID before meals  -CONFUSION REGARDING CURRENT D/C PLAN REGARDING INSULIN PUMP; PRIMARY TEAM NEEDS TO F/U WITH DR GALVEZ on 1/20; PT WAS VERY DIFFICULT on 1/19 REFUSING TO SPEAK WITH DR GALVEZ; POSSIBLY MALINGERING TO CONTINUE STAY HERE pt states insulin pump not working and she cannot go home until it is working, per Dr Galvez insuliln pump was installed last admission and should be fine; Dr Galvez could not get a hold of pt to talk to her and pt refused to speak to her when I had the doctor on the phone; pt then began to throw fit and began crying inconsolably  - Patient needs instruction on insulin pump but received virtual training on prior admission; unsure what the issue is here as her supplies are at home, if d/c'd in am pt can have [facility] nurse do her insulin injections while she sets up her insulin pump; if she leaves later in day then pt shoud receive long acting insulin in AM to help her keep sugars down until pump is set up but please clarify with endo Dr Galvez    # Abdominal Pain, possibly IBS  # mild LFTs elevation  - Seen by GI during last admission   CT Abdomen and Pelvis w/ IV Cont 12/2/21:  No acute intra-abdominal or pelvic pathology is identified  - RUQ US no significant findings  - monitor LFTs > downtrending   - follow up outpatient with GI For MRI of liver lesions and colonoscopy to r/o IBD    # Hx of COVID-19 PNA, resolved  - c/w Robitussin 10mL Q4 PRN Cough  - Currently COVID neg  - On RA, afebrile, no indication for steroid   # Anxiety Disorder   - c/w lexapro 5 mg and amitriptyline 75mg HS  - o/p psych follow up with Dr Gallegos    Diet: CC  Activity: as tolerated  DVT Prophylaxis: lovenox subQ  GI Prophylaxis: famotidine  CHG Order  Code Status: full code  Disposition: From [facility], [facility], d/c tomorrow    Progress note handoff:  - No further workup for syncope/ chest pain. F/u with Dr. Hopper outpatient for MCOT    - No further neuro workup; f/u with neurologist o/p  - Gi f/u outpatient for IBD    PENDING: REPEAT ORTHOSTATICS, F/U DR GALVEZ REGARDING INSULIN PUMP

## 2022-01-19 NOTE — CHART NOTE - NSCHARTNOTEFT_GEN_A_CORE
pt found by nurse after pulling emergency cord in bathroom pt found by nurse after pulling emergency cord in bathroom  pt has bed alarm hooked up but disconnects the alarm when she needs to use the bathroom and "can't hold it in"  on hx, presyncopal, no loc, no prodromal symptoms, found to be mildly shaking bilaterally, no postictal period with almost immediate return to baseline  (pt has been diagnosed with psychogenic pseudoepileptic seizures)    pt states she hit the back of her head on the door  no significant trauma on physical exam, there is a reddish/purple lesion at top of head which appears to be a self inflicted scratch sandy (pt states she has been scratching at her head after application of eeg leads)  neurological exam intact  cnII-XII intact, no motor or sensory deficits, intact nose to finger touch    this is second presyncopal episode in last two days, rapid response yesterday for fall and shaking (likely pseudogenic, stat eeg negative)  orthostatics were checked today and positive 138 systolic to 100 systolic laying to standing up  will d/w team appropriate management

## 2022-01-19 NOTE — PROGRESS NOTE ADULT - ASSESSMENT
35 yo F PMHx DM I since the age of 16 months) on an insulin pump complicated by Diabetic neuropathy , HTN, chronic back pain, migraine headaches, suspected IBS, and vulvodynia recently admitted to the hospital fo DKA and COVID, discharged yesterday, presents to the hospital due to syncope.    A/P:   Questionable Syncope:   Patient with found on the floor yesterday with shaking with evidence of pseudoseizure, kept her eyelid shut tight, legs up.   EEG was negative.   EKG and telemetry no arrhythmia. Serial Troponin negative.   MCOT in place  Patient had normal stress and holter monitor several months ago.     Orthostatic hypotension:   Fluid challenge, recheck orthostatic tomorrow.     COVID-19 infection:   Still with Cough.   COVID-PCR negative.   No hypoxia, no fever.     Vulvodynia  chronic  UTI: continue Macrobid.     Type I DM   Diabetic neuropathy  Continue Lantus/lispro, plan to switch to Insulin pump.     Anxiety Disorder: Lexapro 5 mg and amitriptyline 75mg HS    DVT Prophylaxis: lovenox subQ      #Progress Note Handoff:  Pending (specify): improving orthostatic.   Family discussion: discussed pending discharge  Disposition: Home in 24 hrs.

## 2022-01-19 NOTE — PROVIDER CONTACT NOTE (FALL NOTIFICATION) - ACTION/TREATMENT ORDERED:
Pt. educated to call for assistance before getting up out of bed. Pt. educated to not turn off mattress alarm on own. At this time patient refusing to respond- MD Aguilar aware.
Patient is not compliant with call bell thus placed patient on constant observation for safety.

## 2022-01-19 NOTE — PROVIDER CONTACT NOTE (FALL NOTIFICATION) - ASSESSMENT
upon assessment, patient BP 98/50, HR 77, Pulse ox 99% on room air, Fingerstick of 97. Pt. found laying on left side with red socks and fall risk bracelet in place.
Patient is alert. No LOC. Vitals stable. No bruising noted.

## 2022-01-19 NOTE — PROGRESS NOTE ADULT - SUBJECTIVE AND OBJECTIVE BOX
CHIEF COMPLAINT:  Patient is a 36y old  Female who presents with a chief complaint of syncope (19 Jan 2022 17:06)      INTERVAL HISTORY/OVERNIGHT EVENTS:  1/19  -presyncope again this AM, see fall note  -pt ready for discharge, confusion regarding insulin pump; please contact Dr Galvez via teams to clarify tomorrow in AM See A&P regarding DM for further details  -ivabradine discontinued; per Dr Ovalles it is not needed on discharge; was Rx'd to help lower HR during cardiac CT which was never peformed  -pt being transferred to  as she is no longer tele monitoring; pt is not covid positive but only shedding virus which explains recent positive test (tested positive over ten days ago with one negative test in-between, positive test 2 days ago) asymptomatic, past quarantine period    1/18  -rapid response for fall in bathroom, pt shaking uncontrollably, bilateral UE; on exam less likely seizure; stat EEG ruled out epileptiform activity  -f/u psych for medication adjustment (ho depression and anxiety)    ======================  MEDICATIONS:  amitriptyline Oral Tab/Cap - Peds 75 milliGRAM(s) Oral at bedtime  ammonium lactate 12% Lotion 1 Application(s) Topical two times a day  aspirin  chewable 81 milliGRAM(s) Oral daily  chlorhexidine 4% Liquid 1 Application(s) Topical <User Schedule>  dextrose 40% Gel 15 Gram(s) Oral once  dextrose 50% Injectable 25 Gram(s) IV Push once  dextrose 50% Injectable 12.5 Gram(s) IV Push once  dextrose 50% Injectable 25 Gram(s) IV Push once  enoxaparin Injectable 40 milliGRAM(s) SubCutaneous daily  escitalopram 5 milliGRAM(s) Oral daily  famotidine    Tablet 40 milliGRAM(s) Oral two times a day  glucagon  Injectable 1 milliGRAM(s) IntraMuscular once  insulin glargine Injectable (LANTUS) 30 Unit(s) SubCutaneous every morning  insulin lispro (ADMELOG) corrective regimen sliding scale   SubCutaneous Before meals and at bedtime  insulin lispro Injectable (ADMELOG) 10 Unit(s) SubCutaneous three times a day before meals  lactated ringers Bolus 1000 milliLiter(s) IV Bolus once  magnesium oxide 400 milliGRAM(s) Oral once  metoprolol tartrate 25 milliGRAM(s) Oral every 8 hours  multivitamin Oral Tab/Cap - Peds 1 Tablet(s) Oral daily  naproxen 250 milliGRAM(s) Oral two times a day    DRIPS:  dextrose 5%. 1000 milliLiter(s) (100 mL/Hr) IV Continuous <Continuous>  dextrose 5%. 1000 milliLiter(s) (50 mL/Hr) IV Continuous <Continuous>    PRN:       ======================  PHYSICAL EXAMINATION:  GEN:  nad.   HEENT:  eomi. ncat  PULM:  b/l bs.  clear.  no wheezing. no crackles or rales.   CARD: regular. s1, s2.  no murmurs.   ABD: +bs. ntnd  EXT:  no new rashes.  no pitting edema b/l .   NEURO:  no new focal deficits.   ======================  OBJECTIVE:        VS:  T(F): 98.3 (01-19 @ 20:18), Max: 98.4 (01-19 @ 12:58)  HR: 95 (01-19 @ 20:18) (71 - 95)  BP: 123/66 (01-19 @ 20:18) (101/55 - 155/76)  RR: 18 (01-19 @ 20:18) (18 - 18)  SpO2: 98% (01-19 @ 20:18) (97% - 98%)  CVP(mm Hg): --  CO: --  CI: --  PA: --  PCWP: --    I/O:      01-16 @ 07:01  -  01-17 @ 07:00  --------------------------------------------------------  IN: 100 mL / OUT: 0 mL / NET: 100 mL    01-17 @ 07:01  -  01-18 @ 07:00  --------------------------------------------------------  IN: 2090 mL / OUT: 1525 mL / NET: 565 mL    01-18 @ 07:01  -  01-19 @ 07:00  --------------------------------------------------------  IN: 500 mL / OUT: 0 mL / NET: 500 mL    01-19 @ 07:01  -  01-19 @ 22:01  --------------------------------------------------------  IN: 870 mL / OUT: 500 mL / NET: 370 mL        Weight trend:      ======================    LABS:                          10.5   6.46  )-----------( 371      ( 18 Jan 2022 06:00 )             33.9     01-18    138  |  101  |  12  ----------------------------<  94  4.5   |  22  |  0.6<L>    Ca    9.1      18 Jan 2022 06:00  Mg     1.9     01-18    TPro  6.7  /  Alb  4.0  /  TBili  <0.2  /  DBili  x   /  AST  28  /  ALT  52<H>  /  AlkPhos  105  01-18    LIVER FUNCTIONS - ( 18 Jan 2022 06:00 )  Alb: 4.0 g/dL / Pro: 6.7 g/dL / ALK PHOS: 105 U/L / ALT: 52 U/L / AST: 28 U/L / GGT: x                         Cultures:

## 2022-01-19 NOTE — PROGRESS NOTE ADULT - SUBJECTIVE AND OBJECTIVE BOX
CRISTI MONTGOMERY  36y  Female      Patient is a 36y old  Female who presents with a chief complaint of syncope (18 Jan 2022 13:22)      INTERVAL HPI/OVERNIGHT EVENTS:  She had another episode of pre-syncope,   Vital Signs Last 24 Hrs  T(C): 36.9 (19 Jan 2022 12:58), Max: 36.9 (19 Jan 2022 12:58)  T(F): 98.4 (19 Jan 2022 12:58), Max: 98.4 (19 Jan 2022 12:58)  HR: 90 (19 Jan 2022 12:58) (71 - 91)  BP: 123/66 (19 Jan 2022 12:58) (101/55 - 155/76)  BP(mean): --  RR: 18 (19 Jan 2022 12:58) (18 - 18)  SpO2: 97% (18 Jan 2022 22:10) (97% - 97%)      01-18-22 @ 07:01  - 01-19-22 @ 07:00  --------------------------------------------------------  IN: 500 mL / OUT: 0 mL / NET: 500 mL    01-19-22 @ 07:01  - 01-19-22 @ 17:19  --------------------------------------------------------  IN: 550 mL / OUT: 500 mL / NET: 50 mL            Consultant(s) Notes Reviewed:  [x ] YES  [ ] NO          MEDICATIONS  (STANDING):  amitriptyline Oral Tab/Cap - Peds 75 milliGRAM(s) Oral at bedtime  ammonium lactate 12% Lotion 1 Application(s) Topical two times a day  aspirin  chewable 81 milliGRAM(s) Oral daily  chlorhexidine 4% Liquid 1 Application(s) Topical <User Schedule>  dextrose 40% Gel 15 Gram(s) Oral once  dextrose 5%. 1000 milliLiter(s) (100 mL/Hr) IV Continuous <Continuous>  dextrose 5%. 1000 milliLiter(s) (50 mL/Hr) IV Continuous <Continuous>  dextrose 50% Injectable 25 Gram(s) IV Push once  dextrose 50% Injectable 12.5 Gram(s) IV Push once  dextrose 50% Injectable 25 Gram(s) IV Push once  enoxaparin Injectable 40 milliGRAM(s) SubCutaneous daily  escitalopram 5 milliGRAM(s) Oral daily  famotidine    Tablet 40 milliGRAM(s) Oral two times a day  glucagon  Injectable 1 milliGRAM(s) IntraMuscular once  insulin glargine Injectable (LANTUS) 30 Unit(s) SubCutaneous every morning  insulin lispro (ADMELOG) corrective regimen sliding scale   SubCutaneous Before meals and at bedtime  insulin lispro Injectable (ADMELOG) 10 Unit(s) SubCutaneous three times a day before meals  ivabradine 5 milliGRAM(s) Oral two times a day  magnesium oxide 400 milliGRAM(s) Oral once  metoprolol tartrate 25 milliGRAM(s) Oral every 8 hours  multivitamin Oral Tab/Cap - Peds 1 Tablet(s) Oral daily  naproxen 250 milliGRAM(s) Oral two times a day    MEDICATIONS  (PRN):  acetaminophen     Tablet .. 650 milliGRAM(s) Oral every 6 hours PRN Mild Pain (1 - 3)  cyclobenzaprine 10 milliGRAM(s) Oral at bedtime PRN Muscle Spasm  guaifenesin/dextromethorphan Oral Liquid 10 milliLiter(s) Oral every 4 hours PRN Cough      LABS                          10.5   6.46  )-----------( 371      ( 18 Jan 2022 06:00 )             33.9     01-18    138  |  101  |  12  ----------------------------<  94  4.5   |  22  |  0.6<L>    Ca    9.1      18 Jan 2022 06:00  Mg     1.9     01-18    TPro  6.7  /  Alb  4.0  /  TBili  <0.2  /  DBili  x   /  AST  28  /  ALT  52<H>  /  AlkPhos  105  01-18          Lactate Trend        CAPILLARY BLOOD GLUCOSE      POCT Blood Glucose.: 129 mg/dL (19 Jan 2022 17:11)      Culture - Urine (collected 01-13-22 @ 12:55)  Source: Clean Catch Clean Catch (Midstream)  Final Report (01-15-22 @ 20:36):    >100,000 CFU/ml Streptococcus agalactiae (Group B) isolated    Group B streptococci are susceptible to ampicillin,    penicillin and cefazolin, but may be resistant to    erythromycin and clindamycin.    Recommendations for intrapartum prophylaxis for Group B    streptococci are penicillin or ampicillin.    <10,000 CFU/ml Normal Urogenital susie present        RADIOLOGY & ADDITIONAL TESTS:    Imaging Personally Reviewed:  [ ] YES  [ ] NO    HEALTH ISSUES - PROBLEM Dx:  ARMAAN (generalized anxiety disorder)            PHYSICAL EXAM:  GENERAL: NAD, well-developed.  HEAD:  Atraumatic, Normocephalic.  EYES: EOMI, PERRLA, conjunctiva and sclera clear.  NECK: Supple, No JVD.  CHEST/LUNG: Clear to auscultation bilaterally; No wheeze.  HEART: Regular rate and rhythm; S1 S2.   ABDOMEN: Soft, Nontender, Nondistended; Bowel sounds present.  EXTREMITIES:  2+ Peripheral Pulses, No clubbing, cyanosis, or edema.  PSYCH: AAOx3.  NEUROLOGY: non-focal.  SKIN: No rashes or lesions.

## 2022-01-19 NOTE — PROVIDER CONTACT NOTE (FALL NOTIFICATION) - SITUATION
Dr Mireles and Dr Abbasi notified in person of fall incident. Dr Mireles at bedside assessing patient.

## 2022-01-20 ENCOUNTER — TRANSCRIPTION ENCOUNTER (OUTPATIENT)
Age: 37
End: 2022-01-20

## 2022-01-20 VITALS
TEMPERATURE: 98 F | RESPIRATION RATE: 18 BRPM | DIASTOLIC BLOOD PRESSURE: 60 MMHG | HEART RATE: 91 BPM | SYSTOLIC BLOOD PRESSURE: 132 MMHG

## 2022-01-20 LAB
ALBUMIN SERPL ELPH-MCNC: 4 G/DL — SIGNIFICANT CHANGE UP (ref 3.5–5.2)
ALP SERPL-CCNC: 102 U/L — SIGNIFICANT CHANGE UP (ref 30–115)
ALT FLD-CCNC: 56 U/L — HIGH (ref 0–41)
ANION GAP SERPL CALC-SCNC: 14 MMOL/L — SIGNIFICANT CHANGE UP (ref 7–14)
AST SERPL-CCNC: 35 U/L — SIGNIFICANT CHANGE UP (ref 0–41)
BASOPHILS # BLD AUTO: 0.02 K/UL — SIGNIFICANT CHANGE UP (ref 0–0.2)
BASOPHILS NFR BLD AUTO: 0.4 % — SIGNIFICANT CHANGE UP (ref 0–1)
BILIRUB SERPL-MCNC: 0.3 MG/DL — SIGNIFICANT CHANGE UP (ref 0.2–1.2)
BUN SERPL-MCNC: 9 MG/DL — LOW (ref 10–20)
CALCIUM SERPL-MCNC: 9 MG/DL — SIGNIFICANT CHANGE UP (ref 8.5–10.1)
CHLORIDE SERPL-SCNC: 99 MMOL/L — SIGNIFICANT CHANGE UP (ref 98–110)
CO2 SERPL-SCNC: 22 MMOL/L — SIGNIFICANT CHANGE UP (ref 17–32)
CREAT SERPL-MCNC: 0.5 MG/DL — LOW (ref 0.7–1.5)
EOSINOPHIL # BLD AUTO: 0.14 K/UL — SIGNIFICANT CHANGE UP (ref 0–0.7)
EOSINOPHIL NFR BLD AUTO: 2.8 % — SIGNIFICANT CHANGE UP (ref 0–8)
GLUCOSE BLDC GLUCOMTR-MCNC: 152 MG/DL — HIGH (ref 70–99)
GLUCOSE BLDC GLUCOMTR-MCNC: 165 MG/DL — HIGH (ref 70–99)
GLUCOSE BLDC GLUCOMTR-MCNC: 58 MG/DL — LOW (ref 70–99)
GLUCOSE BLDC GLUCOMTR-MCNC: 70 MG/DL — SIGNIFICANT CHANGE UP (ref 70–99)
GLUCOSE SERPL-MCNC: 173 MG/DL — HIGH (ref 70–99)
HCT VFR BLD CALC: 32.8 % — LOW (ref 37–47)
HGB BLD-MCNC: 10.4 G/DL — LOW (ref 12–16)
IMM GRANULOCYTES NFR BLD AUTO: 0.2 % — SIGNIFICANT CHANGE UP (ref 0.1–0.3)
LYMPHOCYTES # BLD AUTO: 2.28 K/UL — SIGNIFICANT CHANGE UP (ref 1.2–3.4)
LYMPHOCYTES # BLD AUTO: 44.8 % — SIGNIFICANT CHANGE UP (ref 20.5–51.1)
MAGNESIUM SERPL-MCNC: 1.8 MG/DL — SIGNIFICANT CHANGE UP (ref 1.8–2.4)
MCHC RBC-ENTMCNC: 26.6 PG — LOW (ref 27–31)
MCHC RBC-ENTMCNC: 31.7 G/DL — LOW (ref 32–37)
MCV RBC AUTO: 83.9 FL — SIGNIFICANT CHANGE UP (ref 81–99)
MONOCYTES # BLD AUTO: 0.46 K/UL — SIGNIFICANT CHANGE UP (ref 0.1–0.6)
MONOCYTES NFR BLD AUTO: 9 % — SIGNIFICANT CHANGE UP (ref 1.7–9.3)
NEUTROPHILS # BLD AUTO: 2.18 K/UL — SIGNIFICANT CHANGE UP (ref 1.4–6.5)
NEUTROPHILS NFR BLD AUTO: 42.8 % — SIGNIFICANT CHANGE UP (ref 42.2–75.2)
NRBC # BLD: 0 /100 WBCS — SIGNIFICANT CHANGE UP (ref 0–0)
PLATELET # BLD AUTO: 351 K/UL — SIGNIFICANT CHANGE UP (ref 130–400)
POTASSIUM SERPL-MCNC: 4.4 MMOL/L — SIGNIFICANT CHANGE UP (ref 3.5–5)
POTASSIUM SERPL-SCNC: 4.4 MMOL/L — SIGNIFICANT CHANGE UP (ref 3.5–5)
PROT SERPL-MCNC: 6.7 G/DL — SIGNIFICANT CHANGE UP (ref 6–8)
RBC # BLD: 3.91 M/UL — LOW (ref 4.2–5.4)
RBC # FLD: 14.9 % — HIGH (ref 11.5–14.5)
SODIUM SERPL-SCNC: 135 MMOL/L — SIGNIFICANT CHANGE UP (ref 135–146)
WBC # BLD: 5.09 K/UL — SIGNIFICANT CHANGE UP (ref 4.8–10.8)
WBC # FLD AUTO: 5.09 K/UL — SIGNIFICANT CHANGE UP (ref 4.8–10.8)

## 2022-01-20 PROCEDURE — 99239 HOSP IP/OBS DSCHRG MGMT >30: CPT

## 2022-01-20 RX ORDER — SODIUM CHLORIDE 9 MG/ML
1000 INJECTION, SOLUTION INTRAVENOUS ONCE
Refills: 0 | Status: COMPLETED | OUTPATIENT
Start: 2022-01-20 | End: 2022-01-20

## 2022-01-20 RX ADMIN — Medication 10 UNIT(S): at 08:30

## 2022-01-20 RX ADMIN — CYCLOBENZAPRINE HYDROCHLORIDE 10 MILLIGRAM(S): 10 TABLET, FILM COATED ORAL at 05:46

## 2022-01-20 RX ADMIN — Medication 650 MILLIGRAM(S): at 05:43

## 2022-01-20 RX ADMIN — INSULIN GLARGINE 30 UNIT(S): 100 INJECTION, SOLUTION SUBCUTANEOUS at 08:29

## 2022-01-20 RX ADMIN — Medication 81 MILLIGRAM(S): at 12:00

## 2022-01-20 RX ADMIN — SODIUM CHLORIDE 1000 MILLILITER(S): 9 INJECTION, SOLUTION INTRAVENOUS at 12:00

## 2022-01-20 RX ADMIN — ESCITALOPRAM OXALATE 5 MILLIGRAM(S): 10 TABLET, FILM COATED ORAL at 12:00

## 2022-01-20 RX ADMIN — Medication 250 MILLIGRAM(S): at 05:40

## 2022-01-20 RX ADMIN — Medication 25 MILLIGRAM(S): at 05:40

## 2022-01-20 RX ADMIN — ENOXAPARIN SODIUM 40 MILLIGRAM(S): 100 INJECTION SUBCUTANEOUS at 12:07

## 2022-01-20 RX ADMIN — Medication 1: at 12:04

## 2022-01-20 RX ADMIN — CHLORHEXIDINE GLUCONATE 1 APPLICATION(S): 213 SOLUTION TOPICAL at 05:42

## 2022-01-20 RX ADMIN — Medication 650 MILLIGRAM(S): at 06:31

## 2022-01-20 RX ADMIN — Medication 1: at 08:29

## 2022-01-20 RX ADMIN — Medication 250 MILLIGRAM(S): at 06:31

## 2022-01-20 RX ADMIN — Medication 1 TABLET(S): at 12:00

## 2022-01-20 RX ADMIN — Medication 10 UNIT(S): at 12:05

## 2022-01-20 RX ADMIN — FAMOTIDINE 40 MILLIGRAM(S): 10 INJECTION INTRAVENOUS at 05:41

## 2022-01-20 RX ADMIN — MAGNESIUM OXIDE 400 MG ORAL TABLET 400 MILLIGRAM(S): 241.3 TABLET ORAL at 05:42

## 2022-01-20 NOTE — PROGRESS NOTE ADULT - PROVIDER SPECIALTY LIST ADULT
Cardiology
Internal Medicine
Internal Medicine
Hospitalist
Internal Medicine

## 2022-01-20 NOTE — PROGRESS NOTE ADULT - ASSESSMENT
36-year-old, F, patient, PMHx: Type I DM ( since the age of 16 months) on an insulin pump complicated by Diabetic neuropathy , HTN, chronic back pain, migraine headaches, recently admitted to the hospital fo DKA and COVID, discharged yesterday, presents to the hospital due to syncope.    # Syncope x4  # generalized tremors secondary to pscyhogenic nonepileptic seizures  -two episodes prior to admission, two episodes on 1/18 1/19  - trops neg x 3, orthostatic neg in ED, received IV fluids; orthostatics positive on 1/19 will receive 1L bolus; please measure orthostatics 1/20 prior to d/c  -pt had severe diarrhea, recent admission for dka recently; episodes of presyncope likely combination of orthostatics and pseudoseizures  - ECG (01.13.22 @ 12:13): Line Sinus rhythm with short CA; Prolonged QT  - Increased metoprolol to 25mg q8hrs as per EP; do not increase any further per Dr Ovalles  - Sees Dr Christian o/p, had workup done; MCOT placed this admission will f/u with him o/p for MCOT management  - Psychiatry Consulted: Not factitious disorder. Likely Anxiety related o/p f/u with Dr Gallegos  - Neurology consult: f/u with o/p neurologist  - check orthostatics today     # Type I DM   # Diabetic neuropathy  - pt is on insulin pump (insulin supply will last for 3 days in the pump. May need to switch to subQ when insulin running out from the pump, or get non-formulary from pharmacy)  - patient discharged on 1/12 with pump.   - Last admission: Seen by endocrine. Was on Lantus 40 am, 12 pm, and 10 lispro TID before meals    # Abdominal Pain, possibly IBS  # mild LFTs elevation  - Seen by GI during last admission   CT Abdomen and Pelvis w/ IV Cont 12/2/21:  No acute intra-abdominal or pelvic pathology is identified  - RUQ US no significant findings  - monitor LFTs > downtrending   - follow up outpatient with GI For MRI of liver lesions and colonoscopy to r/o IBD    # Hx of COVID-19 PNA, resolved  - c/w Robitussin 10mL Q4 PRN Cough  - Currently COVID neg  - On RA, afebrile, no indication for steroid     # Anxiety Disorder   - c/w lexapro 5 mg and amitriptyline 75mg HS  - o/p psych follow up with Dr Gallegos    Diet: CC  Activity: as tolerated  DVT Prophylaxis: lovenox subQ  GI Prophylaxis: famotidine  CHG Order  Code Status: full code  Disposition: From [facility], d/c tomorrow

## 2022-01-20 NOTE — DISCHARGE NOTE NURSING/CASE MANAGEMENT/SOCIAL WORK - NSDCFUADDAPPT_GEN_ALL_CORE_FT
Please follow up with your outpatient neurologist to work up further your episodes of syncope and whether they may be due to your current medication regimen. You have an appointment scheduled for neurology at the [facility] clinic on 2/17/22    Please follow up with your cardiologist and psychiatrist recommended in this paperwork. You have appointments scheduled for psychiatry on 1/24/22.     Please call to make an appointment with Dr Schaeffer, the gastroenterologist for further work up of your possible IBD and liver lesions found on imaging. Please call her office to make sure you can be seen by her or one of her associates.

## 2022-01-20 NOTE — PROGRESS NOTE ADULT - SUBJECTIVE AND OBJECTIVE BOX
SUBJECTIVE:    Patient is a 36y old Female who presents with a chief complaint of syncope (19 Jan 2022 22:00)    Currently admitted to medicine with the primary diagnosis of Syncope    Today is hospital day 7d. This morning she is in bed and was not responding to medical team. Pt was squinting her eyes and remained silent. Pt later had breakfast and spoke to attending. Pt is alert, oriented and is responsive.       PAST MEDICAL & SURGICAL HISTORY  Neuropathy  right lower extremity, vaginal    Type 1 diabetes    Degenerative disc disease, thoracic    Urinary tract infection, recurrent    Gastroesophageal reflux disease, esophagitis presence not specified    Intractable headache, unspecified chronicity pattern, unspecified headache type    Major depressive disorder with single episode, in full remission  previously treated with zyprexa, celexa and lexapro    Tremor of right hand    Tachycardia    Spinal stenosis    No significant past surgical history    ALLERGIES:  amoxicillin (Unknown)  Ceclor (Rash)  ciprofloxacin (Unknown)  clindamycin (Unknown)  flu vaccines (Other)  gabapentin (Unknown)  Influenza Virus Vaccine, H5N1, Inactivated (Unknown)  penicillins (Unknown)    MEDICATIONS:  STANDING MEDICATIONS  amitriptyline Oral Tab/Cap - Peds 75 milliGRAM(s) Oral at bedtime  ammonium lactate 12% Lotion 1 Application(s) Topical two times a day  aspirin  chewable 81 milliGRAM(s) Oral daily  chlorhexidine 4% Liquid 1 Application(s) Topical <User Schedule>  dextrose 40% Gel 15 Gram(s) Oral once  dextrose 5%. 1000 milliLiter(s) IV Continuous <Continuous>  dextrose 5%. 1000 milliLiter(s) IV Continuous <Continuous>  dextrose 50% Injectable 25 Gram(s) IV Push once  dextrose 50% Injectable 12.5 Gram(s) IV Push once  dextrose 50% Injectable 25 Gram(s) IV Push once  enoxaparin Injectable 40 milliGRAM(s) SubCutaneous daily  escitalopram 5 milliGRAM(s) Oral daily  famotidine    Tablet 40 milliGRAM(s) Oral two times a day  glucagon  Injectable 1 milliGRAM(s) IntraMuscular once  insulin glargine Injectable (LANTUS) 30 Unit(s) SubCutaneous every morning  insulin lispro (ADMELOG) corrective regimen sliding scale   SubCutaneous Before meals and at bedtime  insulin lispro Injectable (ADMELOG) 10 Unit(s) SubCutaneous three times a day before meals  lactated ringers Bolus 1000 milliLiter(s) IV Bolus once  metoprolol tartrate 25 milliGRAM(s) Oral every 8 hours  multivitamin Oral Tab/Cap - Peds 1 Tablet(s) Oral daily  naproxen 250 milliGRAM(s) Oral two times a day    PRN MEDICATIONS  acetaminophen     Tablet .. 650 milliGRAM(s) Oral every 6 hours PRN  cyclobenzaprine 10 milliGRAM(s) Oral at bedtime PRN  guaifenesin/dextromethorphan Oral Liquid 10 milliLiter(s) Oral every 4 hours PRN    VITALS:   T(F): 96.7  HR: 86  BP: 126/60  RR: 18  SpO2: 97%    LABS:                        10.4   5.09  )-----------( 351      ( 20 Jan 2022 04:30 )             32.8     01-20    135  |  99  |  9<L>  ----------------------------<  173<H>  4.4   |  22  |  0.5<L>    Ca    9.0      20 Jan 2022 04:30  Mg     1.8     01-20    TPro  6.7  /  Alb  4.0  /  TBili  0.3  /  DBili  x   /  AST  35  /  ALT  56<H>  /  AlkPhos  102  01-20    RADIOLOGY:    < from: CT Angio Chest PE Protocol w/ IV Cont (01.13.22 @ 13:17) >    ACC: [id number] EXAM:  CT ANGIO CHEST PULM [facility]                          PROCEDURE DATE:  01/13/2022      INTERPRETATION:  CLINICAL HISTORY: Chest pain.    TECHNIQUE: Multislice helical sections were obtained from the thoracic   inlet to thelung bases during rapid administration of intravenous   contrast. Thin sections were reconstructed through the pulmonary   vasculature. Coronal and sagittal reformatted images are also submitted.   MIP images were also added.    COMPARISON: None.    FINDINGS:    PULMONARY EMBOLUS: No filling defects to suggest a pulmonary embolism    LUNGS, PLEURA, AIRWAYS: No lobar consolidation, mass, pleural effusion,   or pneumothorax. No evidence of central endobronchial obstruction. No   bronchiectasis or honeycombing.    THORACIC NODES: No mediastinal, hilar, or axillary lymphadenopathy.    MEDIASTINUM/GREAT VESSELS: No pericardial effusion. Heart size is within   normal limits. The aorta and main pulmonary artery are normal in caliber.    BONES/SOFT TISSUES: Unremarkable.    VISUALIZED UPPER ABDOMEN: Unremarkable.      IMPRESSION:    1.  No evidence of a pulmonary embolism.    --- End of Report ---    MESFIN BLEVINS MD; Attending Radiologist  This document has been electronically signed. Jan13 2022  1:48PM    < end of copied text >  < from: Xray Chest 1 View- PORTABLE-Urgent (01.13.22 @ 12:43) >    ACC: [id number] EXAM:  XR CHEST PORTABLE URGENT 1V                          PROCEDURE DATE:  01/13/2022      INTERPRETATION:  Clinical History / Reason for exam: Chest pain    Comparison : Chest radiograph December 31, 2021.    Technique/Positioning: AP.    Findings:    Support devices: None.    Cardiac/mediastinum/hilum: Unremarkable.    Lung parenchyma/Pleura: Within normal limits.    Skeleton/soft tissues: Unremarkable.    Impression:    No radiographic evidence of acute cardiopulmonary disease.    --- End of Report ---    < end of copied text >      PHYSICAL EXAM:  GEN: No acute distress  PULM/CHEST: Clear to auscultation bilaterally, no rales, rhonchi or wheezes   CVS: Regular rate and rhythm, S1-S2, no murmurs  ABD: Soft, non-tender, non-distended, +BS  EXT: No edema  NEURO: AAOx3

## 2022-01-20 NOTE — DISCHARGE NOTE NURSING/CASE MANAGEMENT/SOCIAL WORK - PATIENT PORTAL LINK FT
You can access the FollowMyHealth Patient Portal offered by [facility] by registering at the following website: [email or web address]. By joining [employer]’s FollowMyHealth portal, you will also be able to view your health information using other applications (apps) compatible with our system.

## 2022-01-20 NOTE — PROGRESS NOTE ADULT - ATTENDING COMMENTS
36-year-old, F, patient, PMHx: Type I DM ( since the age of 16 months) on an insulin pump complicated by Diabetic neuropathy , HTN, chronic back pain, migraine headaches, recently admitted to the hospital fo DKA and COVID, presents to the hospital due to syncope.    # Syncope x4  # generalized tremors secondary to psychogenic nonepileptic seizures  -two episodes prior to admission, two episodes on 1/18 1/19  - trops neg x 3, orthostatic neg in ED, received IV fluids; now orthostatics positive however asymptomatic   -pt had severe diarrhea, recent admission for dka recently; episodes of presyncope likely combination of orthostatics and pseudoseizures  - ECG (01.13.22 @ 12:13): Line Sinus rhythm with short MD; Prolonged QT  - Sees Dr Christian o/p, had workup done; MCOT placed this admission will f/u with him o/p for MCOT management  - Psychiatry Consulted: Not factitious disorder. Likely Anxiety related o/p f/u with Dr. Gallegos  - Neurology consult: f/u with o/p neurologist    # Type I DM   # Diabetic neuropathy  - pt is on insulin pump (insulin supply will last for 3 days in the pump. May need to switch to subQ when insulin running out from the pump, or get non-formulary from pharmacy)  - patient discharged on 1/12 with pump.   - Last admission: Seen by endocrine. Was on Lantus 40 am, 12 pm, and 10 lispro TID before meals    # Abdominal Pain, possibly IBS  # Transaminitis   - Seen by GI during last admission   CT Abdomen and Pelvis w/ IV Cont 12/2/21:  No acute intra-abdominal or pelvic pathology is identified  - RUQ US no significant findings  - monitor LFTs > downtrending   - follow up outpatient with GI For MRI of liver lesions and colonoscopy to r/o IBD    # Hx of COVID-19 PNA, resolved  - stable     # Anxiety Disorder   - c/w lexapro 5 mg and amitriptyline 75mg HS  - o/p psych follow up with Dr. Gallegos      DVT Prophylaxis: lovenox subQ  GI Prophylaxis: famotidine    Disposition: From [facility], d/c today

## 2022-01-20 NOTE — DISCHARGE NOTE NURSING/CASE MANAGEMENT/SOCIAL WORK - NSDCPEFALRISK_GEN_ALL_CORE
For information on Fall & Injury Prevention, visit: [email or web address] OR  [email or web address] OR  https://www.cdc.gov/steadi/patient.html

## 2022-01-20 NOTE — CHART NOTE - NSCHARTNOTEFT_GEN_A_CORE
Pt was d/cd today, prior to d/c she was screaming regarding concerns of her MCOT not working. Mother was on the phone with the pt. MD was notified and came to speak to pt and mother. MD explained how the pt can go back to her assisted living facility and that she can follow with cardio o/p. Mother and pt stated they were told that it is dangerous to leave without the MCOT working and that they received a call from a nurse saying how it was seen on her device that her heart stopped multiple times and that she 'could die if she didn't have the MCOT working'. She also emphasized that Dr. Rodriguez told them to stay in the hospital to get the device fixed. Dr. Rodriguez was called he said pt is cleared for d/c, no abnormalities were seen during her syncopal episodes, no one called the pt from the office, he DID NOT speak to the pt or family today, pt can safely f/u o/p with Dr. Hopper. MCOT still works, likely a reception issue. Mother was called and she agrees with the plan. Pt agreed to leave. As MD is writing this pt locked herself in the bathroom. On call team notified. Pt was d/cd today, prior to d/c she was screaming regarding concerns of her MCOT not working. Mother was on the phone with the pt. MD was notified and came to speak to pt and mother. MD explained how the pt can go back to her assisted living facility and that she can follow with cardio o/p. Mother and pt stated they were told that it is dangerous to leave without the MCOT working and that they received a call from a nurse saying how it was seen on her device that her heart stopped multiple times and that she 'could die if she didn't have the MCOT working'. She also emphasized that Dr. Somers told them to stay in the hospital to get the device fixed. Dr. Somers was called he said pt is cleared for d/c, no abnormalities were seen during her syncopal episodes, no one called the pt from the office, he DID NOT speak to the pt or family today, pt can safely f/u o/p with Dr. Hopper. MCOT still works, likely a reception issue. Mother was called and she agrees with the plan. Pt agreed to leave. As MD is writing this pt locked herself in the bathroom. On call team notified.
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
## 2022-01-22 DIAGNOSIS — F41.1 GENERALIZED ANXIETY DISORDER: ICD-10-CM

## 2022-01-22 DIAGNOSIS — E10.40 TYPE 1 DIABETES MELLITUS WITH DIABETIC NEUROPATHY, UNSPECIFIED: ICD-10-CM

## 2022-01-22 DIAGNOSIS — E87.5 HYPERKALEMIA: ICD-10-CM

## 2022-01-22 DIAGNOSIS — R19.7 DIARRHEA, UNSPECIFIED: ICD-10-CM

## 2022-01-22 DIAGNOSIS — Z88.0 ALLERGY STATUS TO PENICILLIN: ICD-10-CM

## 2022-01-22 DIAGNOSIS — Z96.41 PRESENCE OF INSULIN PUMP (EXTERNAL) (INTERNAL): ICD-10-CM

## 2022-01-22 DIAGNOSIS — K21.9 GASTRO-ESOPHAGEAL REFLUX DISEASE WITHOUT ESOPHAGITIS: ICD-10-CM

## 2022-01-22 DIAGNOSIS — R56.9 UNSPECIFIED CONVULSIONS: ICD-10-CM

## 2022-01-22 DIAGNOSIS — Z71.3 DIETARY COUNSELING AND SURVEILLANCE: ICD-10-CM

## 2022-01-22 DIAGNOSIS — K76.0 FATTY (CHANGE OF) LIVER, NOT ELSEWHERE CLASSIFIED: ICD-10-CM

## 2022-01-22 DIAGNOSIS — Z79.82 LONG TERM (CURRENT) USE OF ASPIRIN: ICD-10-CM

## 2022-01-22 DIAGNOSIS — R74.01 ELEVATION OF LEVELS OF LIVER TRANSAMINASE LEVELS: ICD-10-CM

## 2022-01-22 DIAGNOSIS — E10.649 TYPE 1 DIABETES MELLITUS WITH HYPOGLYCEMIA WITHOUT COMA: ICD-10-CM

## 2022-01-22 DIAGNOSIS — J12.82 PNEUMONIA DUE TO CORONAVIRUS DISEASE 2019: ICD-10-CM

## 2022-01-22 DIAGNOSIS — U07.1 COVID-19: ICD-10-CM

## 2022-01-22 DIAGNOSIS — Z56.0 UNEMPLOYMENT, UNSPECIFIED: ICD-10-CM

## 2022-01-22 DIAGNOSIS — K59.00 CONSTIPATION, UNSPECIFIED: ICD-10-CM

## 2022-01-22 DIAGNOSIS — I10 ESSENTIAL (PRIMARY) HYPERTENSION: ICD-10-CM

## 2022-01-22 DIAGNOSIS — Z88.1 ALLERGY STATUS TO OTHER ANTIBIOTIC AGENTS STATUS: ICD-10-CM

## 2022-01-22 DIAGNOSIS — Z88.7 ALLERGY STATUS TO SERUM AND VACCINE: ICD-10-CM

## 2022-01-22 DIAGNOSIS — T85.614A BREAKDOWN (MECHANICAL) OF INSULIN PUMP, INITIAL ENCOUNTER: ICD-10-CM

## 2022-01-22 DIAGNOSIS — E10.10 TYPE 1 DIABETES MELLITUS WITH KETOACIDOSIS WITHOUT COMA: ICD-10-CM
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
## 2022-01-22 DIAGNOSIS — R00.0 TACHYCARDIA, UNSPECIFIED: ICD-10-CM

## 2022-01-22 SDOH — ECONOMIC STABILITY - INCOME SECURITY: UNEMPLOYMENT, UNSPECIFIED: Z56.0

## 2022-01-24 ENCOUNTER — APPOINTMENT (OUTPATIENT)
Dept: PSYCHIATRY | Facility: CLINIC | Age: 37
End: 2022-01-24

## 2022-01-25 ENCOUNTER — EMERGENCY (EMERGENCY)
Facility: HOSPITAL | Age: 37
LOS: 0 days | Discharge: HOME | End: 2022-01-25
Attending: EMERGENCY MEDICINE | Admitting: EMERGENCY MEDICINE
Payer: MEDICAID

## 2022-01-25 VITALS
RESPIRATION RATE: 20 BRPM | HEIGHT: 64 IN | TEMPERATURE: 98 F | OXYGEN SATURATION: 99 % | HEART RATE: 117 BPM | SYSTOLIC BLOOD PRESSURE: 122 MMHG | DIASTOLIC BLOOD PRESSURE: 64 MMHG

## 2022-01-25 VITALS
HEART RATE: 110 BPM | TEMPERATURE: 98 F | SYSTOLIC BLOOD PRESSURE: 147 MMHG | DIASTOLIC BLOOD PRESSURE: 73 MMHG | RESPIRATION RATE: 20 BRPM | OXYGEN SATURATION: 100 %

## 2022-01-25 DIAGNOSIS — Z88.1 ALLERGY STATUS TO OTHER ANTIBIOTIC AGENTS STATUS: ICD-10-CM

## 2022-01-25 DIAGNOSIS — R56.9 UNSPECIFIED CONVULSIONS: ICD-10-CM

## 2022-01-25 DIAGNOSIS — R55 SYNCOPE AND COLLAPSE: ICD-10-CM
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
## 2022-01-25 DIAGNOSIS — Z88.0 ALLERGY STATUS TO PENICILLIN: ICD-10-CM

## 2022-01-25 DIAGNOSIS — Z88.8 ALLERGY STATUS TO OTHER DRUGS, MEDICAMENTS AND BIOLOGICAL SUBSTANCES: ICD-10-CM
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
## 2022-01-25 DIAGNOSIS — Z79.82 LONG TERM (CURRENT) USE OF ASPIRIN: ICD-10-CM

## 2022-01-25 DIAGNOSIS — Z88.7 ALLERGY STATUS TO SERUM AND VACCINE: ICD-10-CM

## 2022-01-25 DIAGNOSIS — Z87.890 PERSONAL HISTORY OF SEX REASSIGNMENT: ICD-10-CM

## 2022-01-25 LAB
ALBUMIN SERPL ELPH-MCNC: 4.4 G/DL — SIGNIFICANT CHANGE UP (ref 3.5–5.2)
ALP SERPL-CCNC: 136 U/L — HIGH (ref 30–115)
ALT FLD-CCNC: 80 U/L — HIGH (ref 0–41)
ANION GAP SERPL CALC-SCNC: 14 MMOL/L — SIGNIFICANT CHANGE UP (ref 7–14)
AST SERPL-CCNC: 41 U/L — SIGNIFICANT CHANGE UP (ref 0–41)
BASOPHILS # BLD AUTO: 0.04 K/UL — SIGNIFICANT CHANGE UP (ref 0–0.2)
BASOPHILS NFR BLD AUTO: 0.5 % — SIGNIFICANT CHANGE UP (ref 0–1)
BILIRUB SERPL-MCNC: 0.2 MG/DL — SIGNIFICANT CHANGE UP (ref 0.2–1.2)
BUN SERPL-MCNC: 10 MG/DL — SIGNIFICANT CHANGE UP (ref 10–20)
CALCIUM SERPL-MCNC: 9.2 MG/DL — SIGNIFICANT CHANGE UP (ref 8.5–10.1)
CHLORIDE SERPL-SCNC: 100 MMOL/L — SIGNIFICANT CHANGE UP (ref 98–110)
CO2 SERPL-SCNC: 21 MMOL/L — SIGNIFICANT CHANGE UP (ref 17–32)
CREAT SERPL-MCNC: 0.6 MG/DL — LOW (ref 0.7–1.5)
EOSINOPHIL # BLD AUTO: 0.05 K/UL — SIGNIFICANT CHANGE UP (ref 0–0.7)
EOSINOPHIL NFR BLD AUTO: 0.6 % — SIGNIFICANT CHANGE UP (ref 0–8)
GLUCOSE SERPL-MCNC: 220 MG/DL — HIGH (ref 70–99)
HCG SERPL QL: NEGATIVE — SIGNIFICANT CHANGE UP
HCT VFR BLD CALC: 34.4 % — LOW (ref 37–47)
HGB BLD-MCNC: 10.8 G/DL — LOW (ref 12–16)
IMM GRANULOCYTES NFR BLD AUTO: 0.4 % — HIGH (ref 0.1–0.3)
LACTATE SERPL-SCNC: 1.8 MMOL/L — SIGNIFICANT CHANGE UP (ref 0.7–2)
LYMPHOCYTES # BLD AUTO: 1.82 K/UL — SIGNIFICANT CHANGE UP (ref 1.2–3.4)
LYMPHOCYTES # BLD AUTO: 22.1 % — SIGNIFICANT CHANGE UP (ref 20.5–51.1)
MAGNESIUM SERPL-MCNC: 1.9 MG/DL — SIGNIFICANT CHANGE UP (ref 1.8–2.4)
MCHC RBC-ENTMCNC: 26.2 PG — LOW (ref 27–31)
MCHC RBC-ENTMCNC: 31.4 G/DL — LOW (ref 32–37)
MCV RBC AUTO: 83.3 FL — SIGNIFICANT CHANGE UP (ref 81–99)
MONOCYTES # BLD AUTO: 0.59 K/UL — SIGNIFICANT CHANGE UP (ref 0.1–0.6)
MONOCYTES NFR BLD AUTO: 7.2 % — SIGNIFICANT CHANGE UP (ref 1.7–9.3)
NEUTROPHILS # BLD AUTO: 5.69 K/UL — SIGNIFICANT CHANGE UP (ref 1.4–6.5)
NEUTROPHILS NFR BLD AUTO: 69.2 % — SIGNIFICANT CHANGE UP (ref 42.2–75.2)
NRBC # BLD: 0 /100 WBCS — SIGNIFICANT CHANGE UP (ref 0–0)
PLATELET # BLD AUTO: 423 K/UL — HIGH (ref 130–400)
POTASSIUM SERPL-MCNC: 4.4 MMOL/L — SIGNIFICANT CHANGE UP (ref 3.5–5)
POTASSIUM SERPL-SCNC: 4.4 MMOL/L — SIGNIFICANT CHANGE UP (ref 3.5–5)
PROT SERPL-MCNC: 7.1 G/DL — SIGNIFICANT CHANGE UP (ref 6–8)
RBC # BLD: 4.13 M/UL — LOW (ref 4.2–5.4)
RBC # FLD: 15.2 % — HIGH (ref 11.5–14.5)
SODIUM SERPL-SCNC: 135 MMOL/L — SIGNIFICANT CHANGE UP (ref 135–146)
WBC # BLD: 8.22 K/UL — SIGNIFICANT CHANGE UP (ref 4.8–10.8)
WBC # FLD AUTO: 8.22 K/UL — SIGNIFICANT CHANGE UP (ref 4.8–10.8)

## 2022-01-25 PROCEDURE — 99284 EMERGENCY DEPT VISIT MOD MDM: CPT

## 2022-01-25 PROCEDURE — 71045 X-RAY EXAM CHEST 1 VIEW: CPT | Mod: 26

## 2022-01-25 PROCEDURE — 93010 ELECTROCARDIOGRAM REPORT: CPT

## 2022-01-25 NOTE — ED PROVIDER NOTE - CARE PROVIDER_API CALL
Casey Christian (MD)  Cardiovascular Disease; Interventional Cardiology  [address]  [place] [address]  Phone: [phone]  Fax: [phone]  Follow Up Time: 1-3 Days

## 2022-01-25 NOTE — ED ADULT NURSE NOTE - CINV DISCH MEDS REVIEWED YN
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 Treated for                           HLH with Anakinra and Dexamethasone from 9/27/2019. HLH genetic panel negative. New lymphadenopathy while on Anakinra and diagnosed with ALCL on                                   11/20/19\par PROTOCOL:     [id number] with Brentuximab - 6 cycles of Arm BV\par PATHOLOGY:   At diagnosis - right mandibular lymph node showed effaced architecture with large atypical cells which were CD 30+ with cytoplasmic (non nuclear) ALK + \par FLOW CYTOMETRY: 12 % cells were dim CD45, dim CD4, + CD56 ; CD30 not performed\par CYTOGENETICS/FISH: 49,XY,+7,zaid(8)t(2;8)(p?11.2;p?11.2),+19/46,XY/ 65 % nuclei with positive ALK rearrangement\par FOUNDATION ONE: TPM4-ALK fusion with stable MS status ; VUS - CREBBP, EGFR, EPHA5, MAP3K6, MLL2\par \par DISEASE RESPONSE:\par AFTER CYCLE 2, CT/PET showed small residual lymph nodes in the mesentry and right cervical area, significant reduction from prior to chemotherapy\par AFTER CYCLE 4, CT showed no change from after CYCLE 2\par \par DISEASE SURVEILLANCE - \par COMPLETE REMISSION after 6 CYCLES of [id number] Arm BV (Brentuximab cycles)\par END OF THERAPY SCAN: 4/8/20 PET CT [place] 1\par MEDIPORT REMOVAL: 6/19/20\par END OF THERAPY ECHO: Normal\par CUMULATIVE ANTHRACYCLINE EXPOSURE: 150 mg/m2 of Doxorubicin\par \par 3 month scans on 7/21/20 - CT neck, chest, abdomen, pelvis - no evidence of disease, some evidence of thymus hyperplasia reflecting post chemo changes\par 6 month scans on 10/16/20 - CT neck, chest and abdomen show no interval change. Residual cervical and shotty mesenteric lymphadenopathy unchanged from end of therapy\par 9 month scans on 1/18/21 - CT neck, chest and abdomen show no interval change. Residual cervical lymphadenopathy unchanged from end of therapy\par 12 month scans on 4/12/21- No interval change [de-identified] : Yehuda is following today for surveillance of his anaplastic large cell lymphoma\par He is doing well overall. \par No fevers, swelling anywhere on the body\par He had some increased fatigue over the fall which is now improving. He is doing well at school as well. Excited about the upcoming year.\par Received COVID-19 vaccines and booster Yes

## 2022-01-25 NOTE — ED PROVIDER NOTE - ATTENDING CONTRIBUTION TO CARE
36-year-old female to ED with falls patient states she was at [facility] and had a mechanical fall in the bathroom.   She was worried because she had a second fall and was feeling shaky so came to ED for evaluation.     AVSS, exam as noted, CTAB, RRR, abdomen soft NTND, (+) bowel sounds, neuro nonfocal

## 2022-01-25 NOTE — ED ADULT TRIAGE NOTE - CHIEF COMPLAINT QUOTE
c/o possible seizure while walking  to the bathroom today, patient states she did not hit her head, denies anticoagulation, denies seizure history, patient had a recent EEG, Hx halter monitor in place

## 2022-01-25 NOTE — ED ADULT NURSE NOTE - OBJECTIVE STATEMENT
Pt from assistant living facility C/O tremors S/P fall  times 2 today pas out , going to the bathroom pt report no injury from the fall  report pas out ,pt HX Anxiety and DM ,pt report  chest pain on and off for 2 weeks .

## 2022-01-25 NOTE — ED ADULT NURSE REASSESSMENT NOTE - NS ED NURSE REASSESS COMMENT FT1
Pt reassessed A/O times 4 Vs stable remain safety no pain no seizures activity no pain , VS stable ambulate steady  pt is seen evaluate by ED attending clear to go home  waiting for transport .

## 2022-01-25 NOTE — ED PROVIDER NOTE - PATIENT PORTAL LINK FT
You can access the FollowMyHealth Patient Portal offered by [facility] by registering at the following website: [email or web address]. By joining [facility]’s FollowMyHealth portal, you will also be able to view your health information using other applications (apps) compatible with our system.

## 2022-01-25 NOTE — ED PROVIDER NOTE - OBJECTIVE STATEMENT
37 yo female presenting with mechanical fall while getting out of bathroom and then second fall while leaving the room, witnessed. no loc, chest pain, sob, abd pain, weakness, numbness, tingling. Currently being worked up for syncope with holter monitor, extensively worked up for seizure by neurology, has outpatient f/u.

## 2022-01-25 NOTE — ED PROVIDER NOTE - NSFOLLOWUPINSTRUCTIONS_ED_ALL_ED_FT
Fall Prevention in the Home, Adult  Falls can cause injuries. They can happen to people of all ages. There are many things you can do to make your home safe and to help prevent falls. Ask for help when making these changes, if needed.    What actions can I take to prevent falls?  General Instructions     Use good lighting in all rooms. Replace any light bulbs that burn out.  Turn on the lights when you go into a dark area. Use night-lights.  Keep items that you use often in easy-to-reach places. Lower the shelves around your home if necessary.  Set up your furniture so you have a clear path. Avoid moving your furniture around.  Do not have throw rugs and other things on the floor that can make you trip.  Avoid walking on wet floors.  If any of your floors are uneven, fix them.  Add color or contrast paint or tape to clearly sandy and help you see:  Any grab bars or handrails.  First and last steps of stairways.  Where the edge of each step is.  If you use a stepladder:  Make sure that it is fully opened. Do not climb a closed stepladder.  Make sure that both sides of the stepladder are locked into place.  Ask someone to hold the stepladder for you while you use it.  If there are any pets around you, be aware of where they are.  What can I do in the bathroom?     Keep the floor dry. Clean up any water that spills onto the floor as soon as it happens.  Remove soap buildup in the tub or shower regularly.  Use non-skid mats or decals on the floor of the tub or shower.  Attach bath mats securely with double-sided, non-slip rug tape.  If you need to sit down in the shower, use a plastic, non-slip stool.  Install grab bars by the toilet and in the tub and shower. Do not use towel bars as grab bars.  What can I do in the bedroom?     Make sure that you have a light by your bed that is easy to reach.  Do not use any sheets or blankets that are too big for your bed. They should not hang down onto the floor.  Have a firm chair that has side arms. You can use this for support while you get dressed.  What can I do in the kitchen?     Clean up any spills right away.  If you need to reach something above you, use a strong step stool that has a grab bar.  Keep electrical cords out of the way.  Do not use floor polish or wax that makes floors slippery. If you must use wax, use non-skid floor wax.  What can I do with my stairs?     Do not leave any items on the stairs.  Make sure that you have a light switch at the top of the stairs and the bottom of the stairs. If you do not have them, ask someone to add them for you.  Make sure that there are handrails on both sides of the stairs, and use them. Fix handrails that are broken or loose. Make sure that handrails are as long as the stairways.  Install non-slip stair treads on all stairs in your home.  Avoid having throw rugs at the top or bottom of the stairs. If you do have throw rugs, attach them to the floor with carpet tape.  Choose a carpet that does not hide the edge of the steps on the stairway.  Check any carpeting to make sure that it is firmly attached to the stairs. Fix any carpet that is loose or worn.  What can I do on the outside of my home?     Use bright outdoor lighting.  Regularly fix the edges of walkways and driveways and fix any cracks.  Remove anything that might make you trip as you walk through a door, such as a raised step or threshold.  Trim any bushes or trees on the path to your home.  Regularly check to see if handrails are loose or broken. Make sure that both sides of any steps have handrails.  Install guardrails along the edges of any raised decks and porches.  Clear walking paths of anything that might make someone trip, such as tools or rocks.  Have any leaves, snow, or ice cleared regularly.  Use sand or salt on walking paths during winter.  Clean up any spills in your garage right away. This includes grease or oil spills.  What other actions can I take?     Wear shoes that:  Have a low heel. Do not wear high heels.  Have rubber bottoms.  Are comfortable and fit you well.  Are closed at the toe. Do not wear open-toe sandals.  Use tools that help you move around (mobility aids) if they are needed. These include:  Canes.  Walkers.  Scooters.  Crutches.  Review your medicines with your doctor. Some medicines can make you feel dizzy. This can increase your chance of falling.  Ask your doctor what other things you can do to help prevent falls.    Where to find more information  Centers for Disease Control and PreventionROGER: https://cdc.gov  National [place] on Aging: https://hb9qbvv.guera.nih.gov  Contact a doctor if:  You are afraid of falling at home.  You feel weak, drowsy, or dizzy at home.  You fall at home.  Summary  There are many simple things that you can do to make your home safe and to help prevent falls.  Ways to make your home safe include removing tripping hazards and installing grab bars in the bathroom.  Ask for help when making these changes in your home.  This information is not intended to replace advice given to you by your health care provider. Make sure you discuss any questions you have with your health care provider.

## 2022-01-26 ENCOUNTER — INPATIENT (INPATIENT)
Facility: HOSPITAL | Age: 37
LOS: 4 days | Discharge: HOME HEALTH | End: 2022-01-31
Attending: FAMILY MEDICINE | Admitting: FAMILY MEDICINE
Payer: MEDICAID

## 2022-01-26 VITALS
HEART RATE: 125 BPM | DIASTOLIC BLOOD PRESSURE: 72 MMHG | SYSTOLIC BLOOD PRESSURE: 140 MMHG | HEIGHT: 64 IN | OXYGEN SATURATION: 100 % | WEIGHT: 158.07 LBS | RESPIRATION RATE: 18 BRPM

## 2022-01-26 DIAGNOSIS — K21.9 GASTRO-ESOPHAGEAL REFLUX DISEASE WITHOUT ESOPHAGITIS: ICD-10-CM

## 2022-01-26 DIAGNOSIS — E10.9 TYPE 1 DIABETES MELLITUS WITHOUT COMPLICATIONS: ICD-10-CM

## 2022-01-26 DIAGNOSIS — F32.5 MAJOR DEPRESSIVE DISORDER, SINGLE EPISODE, IN FULL REMISSION: ICD-10-CM

## 2022-01-26 DIAGNOSIS — R55 SYNCOPE AND COLLAPSE: ICD-10-CM

## 2022-01-26 LAB
ALBUMIN SERPL ELPH-MCNC: 4.5 G/DL — SIGNIFICANT CHANGE UP (ref 3.5–5.2)
ALP SERPL-CCNC: 132 U/L — HIGH (ref 30–115)
ALT FLD-CCNC: 73 U/L — HIGH (ref 0–41)
ANION GAP SERPL CALC-SCNC: 15 MMOL/L — HIGH (ref 7–14)
ANION GAP SERPL CALC-SCNC: 18 MMOL/L — HIGH (ref 7–14)
APPEARANCE UR: CLEAR — SIGNIFICANT CHANGE UP
APTT BLD: 35.8 SEC — SIGNIFICANT CHANGE UP (ref 27–39.2)
AST SERPL-CCNC: 40 U/L — SIGNIFICANT CHANGE UP (ref 0–41)
B-OH-BUTYR SERPL-SCNC: 0.7 MMOL/L — HIGH
B-OH-BUTYR SERPL-SCNC: 3.5 MMOL/L — HIGH
BASOPHILS # BLD AUTO: 0.04 K/UL — SIGNIFICANT CHANGE UP (ref 0–0.2)
BASOPHILS NFR BLD AUTO: 0.3 % — SIGNIFICANT CHANGE UP (ref 0–1)
BILIRUB SERPL-MCNC: 0.4 MG/DL — SIGNIFICANT CHANGE UP (ref 0.2–1.2)
BILIRUB UR-MCNC: NEGATIVE — SIGNIFICANT CHANGE UP
BUN SERPL-MCNC: 10 MG/DL — SIGNIFICANT CHANGE UP (ref 10–20)
BUN SERPL-MCNC: 9 MG/DL — LOW (ref 10–20)
CALCIUM SERPL-MCNC: 9.7 MG/DL — SIGNIFICANT CHANGE UP (ref 8.5–10.1)
CALCIUM SERPL-MCNC: 9.7 MG/DL — SIGNIFICANT CHANGE UP (ref 8.5–10.1)
CHLORIDE SERPL-SCNC: 101 MMOL/L — SIGNIFICANT CHANGE UP (ref 98–110)
CHLORIDE SERPL-SCNC: 95 MMOL/L — LOW (ref 98–110)
CK MB CFR SERPL CALC: 1.7 NG/ML — SIGNIFICANT CHANGE UP (ref 0.6–6.3)
CK MB CFR SERPL CALC: 1.9 NG/ML — SIGNIFICANT CHANGE UP (ref 0.6–6.3)
CK SERPL-CCNC: 58 U/L — SIGNIFICANT CHANGE UP (ref 0–225)
CO2 SERPL-SCNC: 18 MMOL/L — SIGNIFICANT CHANGE UP (ref 17–32)
CO2 SERPL-SCNC: 19 MMOL/L — SIGNIFICANT CHANGE UP (ref 17–32)
COLOR SPEC: YELLOW — SIGNIFICANT CHANGE UP
CREAT SERPL-MCNC: 0.7 MG/DL — SIGNIFICANT CHANGE UP (ref 0.7–1.5)
CREAT SERPL-MCNC: 0.7 MG/DL — SIGNIFICANT CHANGE UP (ref 0.7–1.5)
D DIMER BLD IA.RAPID-MCNC: 148 NG/ML DDU — SIGNIFICANT CHANGE UP (ref 0–230)
D DIMER BLD IA.RAPID-MCNC: 88 NG/ML DDU — SIGNIFICANT CHANGE UP (ref 0–230)
DIFF PNL FLD: NEGATIVE — SIGNIFICANT CHANGE UP
EOSINOPHIL # BLD AUTO: 0.01 K/UL — SIGNIFICANT CHANGE UP (ref 0–0.7)
EOSINOPHIL NFR BLD AUTO: 0.1 % — SIGNIFICANT CHANGE UP (ref 0–8)
GLUCOSE BLDC GLUCOMTR-MCNC: 232 MG/DL — HIGH (ref 70–99)
GLUCOSE BLDC GLUCOMTR-MCNC: 288 MG/DL — HIGH (ref 70–99)
GLUCOSE BLDC GLUCOMTR-MCNC: 291 MG/DL — HIGH (ref 70–99)
GLUCOSE BLDC GLUCOMTR-MCNC: 340 MG/DL — HIGH (ref 70–99)
GLUCOSE BLDC GLUCOMTR-MCNC: 366 MG/DL — HIGH (ref 70–99)
GLUCOSE BLDC GLUCOMTR-MCNC: 375 MG/DL — HIGH (ref 70–99)
GLUCOSE SERPL-MCNC: 146 MG/DL — HIGH (ref 70–99)
GLUCOSE SERPL-MCNC: 382 MG/DL — HIGH (ref 70–99)
GLUCOSE UR QL: 500 MG/DL
HCG SERPL QL: NEGATIVE — SIGNIFICANT CHANGE UP
HCT VFR BLD CALC: 36.2 % — LOW (ref 37–47)
HGB BLD-MCNC: 11.3 G/DL — LOW (ref 12–16)
IMM GRANULOCYTES NFR BLD AUTO: 0.7 % — HIGH (ref 0.1–0.3)
INR BLD: 1.24 RATIO — SIGNIFICANT CHANGE UP (ref 0.65–1.3)
KETONES UR-MCNC: 160
LEUKOCYTE ESTERASE UR-ACNC: NEGATIVE — SIGNIFICANT CHANGE UP
LYMPHOCYTES # BLD AUTO: 1.4 K/UL — SIGNIFICANT CHANGE UP (ref 1.2–3.4)
LYMPHOCYTES # BLD AUTO: 11.9 % — LOW (ref 20.5–51.1)
MAGNESIUM SERPL-MCNC: 1.8 MG/DL — SIGNIFICANT CHANGE UP (ref 1.8–2.4)
MCHC RBC-ENTMCNC: 26.1 PG — LOW (ref 27–31)
MCHC RBC-ENTMCNC: 31.2 G/DL — LOW (ref 32–37)
MCV RBC AUTO: 83.6 FL — SIGNIFICANT CHANGE UP (ref 81–99)
MONOCYTES # BLD AUTO: 0.61 K/UL — HIGH (ref 0.1–0.6)
MONOCYTES NFR BLD AUTO: 5.2 % — SIGNIFICANT CHANGE UP (ref 1.7–9.3)
NEUTROPHILS # BLD AUTO: 9.65 K/UL — HIGH (ref 1.4–6.5)
NEUTROPHILS NFR BLD AUTO: 81.8 % — HIGH (ref 42.2–75.2)
NITRITE UR-MCNC: NEGATIVE — SIGNIFICANT CHANGE UP
NRBC # BLD: 0 /100 WBCS — SIGNIFICANT CHANGE UP (ref 0–0)
NT-PROBNP SERPL-SCNC: 33 PG/ML — SIGNIFICANT CHANGE UP (ref 0–300)
PH UR: 5.5 — SIGNIFICANT CHANGE UP (ref 5–8)
PLATELET # BLD AUTO: 426 K/UL — HIGH (ref 130–400)
POTASSIUM SERPL-MCNC: 4.3 MMOL/L — SIGNIFICANT CHANGE UP (ref 3.5–5)
POTASSIUM SERPL-MCNC: 5.4 MMOL/L — HIGH (ref 3.5–5)
POTASSIUM SERPL-SCNC: 4.3 MMOL/L — SIGNIFICANT CHANGE UP (ref 3.5–5)
POTASSIUM SERPL-SCNC: 5.4 MMOL/L — HIGH (ref 3.5–5)
PROT SERPL-MCNC: 7.9 G/DL — SIGNIFICANT CHANGE UP (ref 6–8)
PROT UR-MCNC: NEGATIVE MG/DL — SIGNIFICANT CHANGE UP
PROTHROM AB SERPL-ACNC: 14.2 SEC — HIGH (ref 9.95–12.87)
RBC # BLD: 4.33 M/UL — SIGNIFICANT CHANGE UP (ref 4.2–5.4)
RBC # FLD: 15 % — HIGH (ref 11.5–14.5)
SARS-COV-2 RNA SPEC QL NAA+PROBE: DETECTED
SODIUM SERPL-SCNC: 131 MMOL/L — LOW (ref 135–146)
SODIUM SERPL-SCNC: 135 MMOL/L — SIGNIFICANT CHANGE UP (ref 135–146)
SP GR SPEC: 1.02 — SIGNIFICANT CHANGE UP (ref 1.01–1.03)
TROPONIN T SERPL-MCNC: <0.01 NG/ML — SIGNIFICANT CHANGE UP
UROBILINOGEN FLD QL: 0.2 MG/DL — SIGNIFICANT CHANGE UP
WBC # BLD: 11.79 K/UL — HIGH (ref 4.8–10.8)
WBC # FLD AUTO: 11.79 K/UL — HIGH (ref 4.8–10.8)

## 2022-01-26 PROCEDURE — 99254 IP/OBS CNSLTJ NEW/EST MOD 60: CPT

## 2022-01-26 PROCEDURE — 99233 SBSQ HOSP IP/OBS HIGH 50: CPT

## 2022-01-26 PROCEDURE — 99221 1ST HOSP IP/OBS SF/LOW 40: CPT

## 2022-01-26 PROCEDURE — 70450 CT HEAD/BRAIN W/O DYE: CPT | Mod: 26,MA

## 2022-01-26 PROCEDURE — 93010 ELECTROCARDIOGRAM REPORT: CPT

## 2022-01-26 PROCEDURE — 71045 X-RAY EXAM CHEST 1 VIEW: CPT | Mod: 26

## 2022-01-26 PROCEDURE — 99285 EMERGENCY DEPT VISIT HI MDM: CPT

## 2022-01-26 RX ORDER — SODIUM CHLORIDE 9 MG/ML
1000 INJECTION, SOLUTION INTRAVENOUS
Refills: 0 | Status: DISCONTINUED | OUTPATIENT
Start: 2022-01-26 | End: 2022-01-31

## 2022-01-26 RX ORDER — HYDROXYZINE HCL 10 MG
50 TABLET ORAL EVERY 6 HOURS
Refills: 0 | Status: DISCONTINUED | OUTPATIENT
Start: 2022-01-26 | End: 2022-01-31

## 2022-01-26 RX ORDER — INSULIN GLARGINE 100 [IU]/ML
10 INJECTION, SOLUTION SUBCUTANEOUS EVERY MORNING
Refills: 0 | Status: DISCONTINUED | OUTPATIENT
Start: 2022-01-27 | End: 2022-01-27

## 2022-01-26 RX ORDER — GLUCAGON INJECTION, SOLUTION 0.5 MG/.1ML
1 INJECTION, SOLUTION SUBCUTANEOUS ONCE
Refills: 0 | Status: DISCONTINUED | OUTPATIENT
Start: 2022-01-26 | End: 2022-01-31

## 2022-01-26 RX ORDER — AMITRIPTYLINE HCL 25 MG
75 TABLET ORAL AT BEDTIME
Refills: 0 | Status: DISCONTINUED | OUTPATIENT
Start: 2022-01-26 | End: 2022-01-31

## 2022-01-26 RX ORDER — ENOXAPARIN SODIUM 100 MG/ML
40 INJECTION SUBCUTANEOUS DAILY
Refills: 0 | Status: DISCONTINUED | OUTPATIENT
Start: 2022-01-26 | End: 2022-01-31

## 2022-01-26 RX ORDER — INSULIN HUMAN 100 [IU]/ML
6 INJECTION, SOLUTION SUBCUTANEOUS ONCE
Refills: 0 | Status: COMPLETED | OUTPATIENT
Start: 2022-01-26 | End: 2022-01-26

## 2022-01-26 RX ORDER — CHLORHEXIDINE GLUCONATE 213 G/1000ML
1 SOLUTION TOPICAL
Refills: 0 | Status: DISCONTINUED | OUTPATIENT
Start: 2022-01-26 | End: 2022-01-31

## 2022-01-26 RX ORDER — SODIUM CHLORIDE 9 MG/ML
2000 INJECTION, SOLUTION INTRAVENOUS
Refills: 0 | Status: DISCONTINUED | OUTPATIENT
Start: 2022-01-26 | End: 2022-01-26

## 2022-01-26 RX ORDER — ESCITALOPRAM OXALATE 10 MG/1
5 TABLET, FILM COATED ORAL DAILY
Refills: 0 | Status: DISCONTINUED | OUTPATIENT
Start: 2022-01-26 | End: 2022-01-26

## 2022-01-26 RX ORDER — METOPROLOL TARTRATE 50 MG
25 TABLET ORAL
Refills: 0 | Status: DISCONTINUED | OUTPATIENT
Start: 2022-01-26 | End: 2022-01-26

## 2022-01-26 RX ORDER — CYCLOBENZAPRINE HYDROCHLORIDE 10 MG/1
10 TABLET, FILM COATED ORAL AT BEDTIME
Refills: 0 | Status: DISCONTINUED | OUTPATIENT
Start: 2022-01-26 | End: 2022-01-31

## 2022-01-26 RX ORDER — DEXTROSE 50 % IN WATER 50 %
25 SYRINGE (ML) INTRAVENOUS ONCE
Refills: 0 | Status: DISCONTINUED | OUTPATIENT
Start: 2022-01-26 | End: 2022-01-31

## 2022-01-26 RX ORDER — ASPIRIN/CALCIUM CARB/MAGNESIUM 324 MG
81 TABLET ORAL DAILY
Refills: 0 | Status: DISCONTINUED | OUTPATIENT
Start: 2022-01-26 | End: 2022-01-31

## 2022-01-26 RX ORDER — METOPROLOL TARTRATE 50 MG
25 TABLET ORAL EVERY 12 HOURS
Refills: 0 | Status: DISCONTINUED | OUTPATIENT
Start: 2022-01-26 | End: 2022-01-27

## 2022-01-26 RX ORDER — INSULIN LISPRO 100/ML
5 VIAL (ML) SUBCUTANEOUS
Refills: 0 | Status: DISCONTINUED | OUTPATIENT
Start: 2022-01-26 | End: 2022-01-28

## 2022-01-26 RX ORDER — DEXTROSE 50 % IN WATER 50 %
15 SYRINGE (ML) INTRAVENOUS ONCE
Refills: 0 | Status: DISCONTINUED | OUTPATIENT
Start: 2022-01-26 | End: 2022-01-31

## 2022-01-26 RX ORDER — INSULIN LISPRO 100/ML
VIAL (ML) SUBCUTANEOUS
Refills: 0 | Status: DISCONTINUED | OUTPATIENT
Start: 2022-01-26 | End: 2022-01-29

## 2022-01-26 RX ORDER — INSULIN LISPRO 100/ML
3 VIAL (ML) SUBCUTANEOUS ONCE
Refills: 0 | Status: COMPLETED | OUTPATIENT
Start: 2022-01-26 | End: 2022-01-26

## 2022-01-26 RX ORDER — SODIUM CHLORIDE 9 MG/ML
500 INJECTION INTRAMUSCULAR; INTRAVENOUS; SUBCUTANEOUS ONCE
Refills: 0 | Status: COMPLETED | OUTPATIENT
Start: 2022-01-26 | End: 2022-01-26

## 2022-01-26 RX ORDER — INSULIN GLARGINE 100 [IU]/ML
10 INJECTION, SOLUTION SUBCUTANEOUS ONCE
Refills: 0 | Status: COMPLETED | OUTPATIENT
Start: 2022-01-26 | End: 2022-01-26

## 2022-01-26 RX ORDER — SOD,AMMONIUM,POTASSIUM LACTATE
1 CREAM (GRAM) TOPICAL
Qty: 0 | Refills: 0 | DISCHARGE

## 2022-01-26 RX ORDER — DEXTROSE 50 % IN WATER 50 %
12.5 SYRINGE (ML) INTRAVENOUS ONCE
Refills: 0 | Status: DISCONTINUED | OUTPATIENT
Start: 2022-01-26 | End: 2022-01-31

## 2022-01-26 RX ORDER — ONDANSETRON 8 MG/1
4 TABLET, FILM COATED ORAL EVERY 4 HOURS
Refills: 0 | Status: DISCONTINUED | OUTPATIENT
Start: 2022-01-26 | End: 2022-01-31

## 2022-01-26 RX ORDER — ESCITALOPRAM OXALATE 10 MG/1
10 TABLET, FILM COATED ORAL DAILY
Refills: 0 | Status: DISCONTINUED | OUTPATIENT
Start: 2022-01-26 | End: 2022-01-31

## 2022-01-26 RX ADMIN — ENOXAPARIN SODIUM 40 MILLIGRAM(S): 100 INJECTION SUBCUTANEOUS at 11:58

## 2022-01-26 RX ADMIN — SODIUM CHLORIDE 500 MILLILITER(S): 9 INJECTION INTRAMUSCULAR; INTRAVENOUS; SUBCUTANEOUS at 09:26

## 2022-01-26 RX ADMIN — Medication 5: at 12:26

## 2022-01-26 RX ADMIN — INSULIN GLARGINE 10 UNIT(S): 100 INJECTION, SOLUTION SUBCUTANEOUS at 11:00

## 2022-01-26 RX ADMIN — ONDANSETRON 4 MILLIGRAM(S): 8 TABLET, FILM COATED ORAL at 20:22

## 2022-01-26 RX ADMIN — ESCITALOPRAM OXALATE 5 MILLIGRAM(S): 10 TABLET, FILM COATED ORAL at 11:58

## 2022-01-26 RX ADMIN — SODIUM CHLORIDE 1000 MILLILITER(S): 9 INJECTION, SOLUTION INTRAVENOUS at 04:49

## 2022-01-26 RX ADMIN — Medication 1 TABLET(S): at 11:58

## 2022-01-26 RX ADMIN — ONDANSETRON 4 MILLIGRAM(S): 8 TABLET, FILM COATED ORAL at 11:02

## 2022-01-26 RX ADMIN — Medication 75 MILLIGRAM(S): at 21:16

## 2022-01-26 RX ADMIN — Medication 25 MILLIGRAM(S): at 14:21

## 2022-01-26 RX ADMIN — Medication 81 MILLIGRAM(S): at 11:58

## 2022-01-26 RX ADMIN — Medication 3 UNIT(S): at 21:28

## 2022-01-26 RX ADMIN — Medication 5 UNIT(S): at 12:27

## 2022-01-26 RX ADMIN — INSULIN HUMAN 6 UNIT(S): 100 INJECTION, SOLUTION SUBCUTANEOUS at 04:49

## 2022-01-26 NOTE — ED PROVIDER NOTE - PHYSICAL EXAMINATION
VITAL SIGNS: I have reviewed nursing notes and confirm.  CONSTITUTIONAL: Well-developed; well-nourished; in no acute distress.  SKIN: Skin exam is warm and dry, no acute rash.  HEAD: Normocephalic; atraumatic.  EYES: PERRL, EOM intact; conjunctiva and sclera clear.  ENT: No nasal discharge; airway clear.   NECK: Supple; non tender.  CARD: S1, S2 normal; no murmurs, gallops, or rubs. Regular rate and rhythm.  RESP: No wheezes, rales or rhonchi. Speaking in full sentences.   ABD: Normal bowel sounds; soft; non-distended; non-tender; No rebound or guarding.   EXT: Normal ROM.   NEURO: Alert, oriented. Grossly unremarkable. No focal deficits.

## 2022-01-26 NOTE — H&P ADULT - HISTORY OF PRESENT ILLNESS
37yo female whose PMH includes type 1 diabetes on insulin pump and tachycardia, presents to ER after recurrent episodes of syncope (or near syncope). She reports that yesterday she had episode of body shaking. Saw her cardiologist who recommended she go to [facility] for psych evaluation. Tonight, while at home, she went into her parent's room where she apparently collapsed  35yo female whose PMH includes type 1 diabetes on insulin pump and tachycardia, presents to ER after recurrent episodes of syncope (or near syncope). She reports that yesterday she had episode of body shaking. Saw her cardiologist who recommended she go to [facility] for psych evaluation. Tonight, while at home, she went into her parent's room where she apparently collapsed . At this point I left to obtain copy of medications from EMR to review but when I returned she was on the phone with her father hysterical crying and not stopping to talk to me Hospitalist Note:   37yo female whose PMH includes type 1 diabetes on insulin pump and tachycardia, presents to ER after recurrent episodes of syncope (or near syncope). She reports that yesterday she had episode of body shaking. Saw her cardiologist who recommended she go to [facility] [place] for psych evaluation. Tonight, while at home, she went into her parent's room where she apparently collapsed . At this point I left to obtain copy of medications from EMR to review but when I returned she was on the phone with her father hysterical crying and not stopping to talk to me    Medical PA Note:  Patient is a resident at [facility] Residence: 7:30 AM yesterday fainted in her bathroom after a shower. Fainting spells recently since Christmas. Was admitted to [facility] about 1 1/2 weeks ago for evaluation. She was placed on a MCOT by the Cardiologist: Dr Hopper.   - she was taken to [facility] yesterday after the the above fainting spell: EKG, Labwork, CXR done. Seen by Neurologist: Dr Dash who recommends 24-48 hr EEG to r/o Seizure activity.  She was D/C back to [facility] but had a shaking episode: sent to [facility] [place]: evaluated and discharged home again. At home she fell again and sent to [facility] for evaluation.  -

## 2022-01-26 NOTE — ED PROVIDER NOTE - OBJECTIVE STATEMENT
37 yo F with PMHx of DM I, diabetic neuropathy, HTN, chronic back pain, migraine headaches, IBS, anxiety, depression, GERD, R hand tremor, and pseudoseizures? presents to the ED for evaluation of possible seizure. According to staff at [facility] pt was having episodes of unresponsiveness/syncope. Pt had multiple episodes today, was evaluated in hospital earlier today and sent back because symptoms reoccurred. Pt also states "I might be in DKA because I'm nauseous and threw up once." Pt denies other complaints. Pt denies fever, chills, abdominal pain, diarrhea, headache, dizziness, weakness, chest pain, SOB, back pain, urinary symptoms, cough.

## 2022-01-26 NOTE — H&P ADULT - NSHPLABSRESULTS_GEN_ALL_CORE
11.3   11.79 )-----------( 426      ( 2022 02:55 )             36.2           135  |  101  |  9<L>  ----------------------------<  146<H>  4.3   |  19  |  0.7    Ca    9.7      2022 05:25  Mg     1.8         TPro  7.9  /  Alb  4.5  /  TBili  0.4  /  DBili  x   /  AST  40  /  ALT  73<H>  /  AlkPhos  132<H>                Urinalysis Basic - ( 2022 04:20 )    Color: Yellow / Appearance: Clear / S.025 / pH: x  Gluc: x / Ketone: 160  / Bili: Negative / Urobili: 0.2 mg/dL   Blood: x / Protein: Negative mg/dL / Nitrite: Negative   Leuk Esterase: Negative / RBC: x / WBC x   Sq Epi: x / Non Sq Epi: x / Bacteria: x        PT/INR - ( 2022 02:55 )   PT: 14.20 sec;   INR: 1.24 ratio         PTT - ( 2022 02:55 )  PTT:35.8 sec    Lactate Trend   @ 09:12 Lactate:1.8       CARDIAC MARKERS ( 2022 02:55 )  x     / <0.01 ng/mL / 58 U/L / x     / x            CAPILLARY BLOOD GLUCOSE      POCT Blood Glucose.: 142 mg/dL (2022 06:24)        Culture Results:   >100,000 CFU/ml Streptococcus agalactiae (Group B) isolated  Group B streptococci are susceptible to ampicillin,  penicillin and cefazolin, but may be resistant to  erythromycin and clindamycin.  Recommendations for intrapartum prophylaxis for Group B  streptococci are penicillin or ampicillin.  <10,000 CFU/ml Normal Urogenital susie present ( @ 12:55)  Culture Results:   No enteric pathogens isolated.  (Stool culture examined for Salmonella,  Shigella, Campylobacter, Aeromonas, Plesiomonas,  Vibrio, E.coli O157 and Yersinia) ( @ 12:20)  Culture Results:   <10,000 CFU/mL Normal Urogenital Susie ( @ 03:38)  Culture Results:   No Growth Final ( @ 23:30)  Culture Results:   No Growth Final ( @ 22:44)    CT Head No Cont (22 @ 03:50) >    Impression:    No acute intracranial abnormality.    < from: Xray Chest 1 View- PORTABLE-Urgent (22 @ 09:41) >    Impression:    No radiographic evidence of acute cardiopulmonary disease.     CT Angio Chest PE Protocol w/ IV Cont (22 @ 13:17) >    IMPRESSION:    1.  No evidence of a pulmonary embolism.

## 2022-01-26 NOTE — PATIENT PROFILE ADULT - FALL HARM RISK - HARM RISK INTERVENTIONS

## 2022-01-26 NOTE — CONSULT NOTE ADULT - SUBJECTIVE AND OBJECTIVE BOX
HPI: Pt is a 36F who is domiciled at [facility] with parents, unemployed, single, with PMHx of T1DM, Diabetic neuropathy, HTN, chronic back pain, migraine headaches, PPHx of anxiety/depression, 3 IPP admissions for suicide attempts as a teenager, presented to the ED for episodes of shaking and syncopal episodes. Psychiatry consulted for depression.     On evaluation, pt reports that she had a tough few weeks. Reports she was in [facility] for diabetic ketoacidod due to her pump malfunction, she then contracted COVID while in the hospital. States for the last 4 months she has been having "hand tremors" then about 3 months ago started to have "leg tremors." States recently she had "whole body shakes" with periods where she faints. She has been following up with a neurologist. Reports she was also sent to [facility] for a psychiatric evaluation as someone told her she is "faking her symptoms." Reports that she has been in and out of the hospitals for te last several weeks. States she is currentlyon a heart monitor also. States that her neurologist recommended her to have a video EEG done.     Pt denied any acute stressors lately. Reports she had a stressful situation with being evicted from her house with her family and having to move to [facility]. However, she has been living there for 1 year. Endorsed fair appetite, though today is feeling nauseous. Reports fair sleep. She denied depressed mood. Denied manic symptoms. Denied auditory/visual hallucinations. Denied paranoia. Denied suicidal/homicidal ideation, intent or plan.      PMHx: PMHx of T1DM, Diabetic neuropathy , HTN, chronic back pain, migraine headaches      PPHx:History of bipolar vs. depression. 3 past IPP admissions as a teenager, about 20 years ago. Recently has been started on lexapro 5 mg qd while she was admitted to medicine at [facility]. Had an outpatient appointment this past Mon, which she did not make due to her symptoms.     Past trials of: lexapro, buspar, zyprexa, celexa (latter 2 stopped over 18 years ago reportedly; zyprexa reportedly gave her galactorrhea.)    Substance Hx: Denied drug/alcohol use.     Family Hx: Pt is adopted and does not know her family medical/psychiatric hx.    Social Hx: Lives at [facility].       Vitals:  ICU Vital Signs Last 24 Hrs  T(C): 36.9 (26 Jan 2022 02:17), Max: 36.9 (26 Jan 2022 02:17)  T(F): 98.5 (26 Jan 2022 02:17), Max: 98.5 (26 Jan 2022 02:17)  HR: 126 (26 Jan 2022 09:44) (119 - 126)  BP: 115/58 (26 Jan 2022 09:26) (108/59 - 145/77)  BP(mean): --  ABP: --  ABP(mean): --  RR: 18 (26 Jan 2022 09:44) (18 - 18)  SpO2: 100% (26 Jan 2022 09:44) (100% - 100%)    EKG:  < from: 12 Lead ECG (01.25.22 @ 09:45) >  Ventricular Rate 107 BPM    Atrial Rate 107 BPM    P-R Interval 124 ms    QRS Duration 86 ms    Q-T Interval 360 ms    QTC Calculation(Bazett) 480 ms    < end of copied text >    MSE:  MSE: In bed, calm, cooperative, with good eye contact, AAO x3,, no pma/r, speech soft, Nl rate/rhythm, mood "ok", affect constricted , t/p linear, t/c denies SI/HI/AVH, Insight/judgement: fair   Diagnosis:  ARMAAN    Assessment: Pt is a 36F who is domiciled at [facility] with parents, unemployed, single, with PMHx of T1DM, Diabetic neuropathy, HTN, chronic back pain, migraine headaches, PPHx of anxiety/depression, 3 IPP admissions for suicide attempts as a teenager, presented to the ED for episodes of shaking and syncopal episodes. Psychiatry consulted for depression. Pt denied acute depressive mood or acute anxiety.     The cause of patient's behavior is unclear at this time. It is possible that it is secondary to an anxiety disorder , however patient does not appear to have any acute symptoms of anxiety or panic attacks. in addition , she does not appear to have acute symptoms of psychosis, alvina or depression. She denies having current suicidal ideations, intent or plan.At this time, patient is not considered an imminent danger to himself or others and does not need inpatient psychiatric hospitalization.     Plan:  -Can increase Lexapro to 10 mg qd  -Hydroxyzine 50 mg q6h prn anxiety/insomnia  -No indication for IPP admission at this time  - Neuro workup recommended   -Psych CL is signing off, please call back with any further questions

## 2022-01-26 NOTE — ED PROVIDER NOTE - NS ED ROS FT
Review of Systems  Constitutional:  No fever, chills.  Eyes:  No visual changes, eye pain, or discharge.  ENMT:  No hearing changes, pain, or discharge. No nasal congestion, discharge, or bleeding. No throat pain, swelling, or difficulty swallowing.  Cardiac:  No chest pain, palpitations, or edema. (+) LOC  Respiratory:  No dyspnea, cough. No hemoptysis.  GI:  No diarrhea, or abdominal pain. (+) nausea, vomiting  :  No dysuria, hematuria, frequency, or burning.   MS:  No back pain.  Skin:  No skin rash, pruritis, jaundice, or lesions.  Neuro:  No headache, dizziness, loss of sensation, or focal weakness.  No change in mental status.

## 2022-01-26 NOTE — CONSULT NOTE ADULT - ASSESSMENT
Assessment:  This is a 36y Female who is COVID +, with h/o type 1 DM, HTN, chronic back pain, peripheral neuropathy, migraine headaches, mood disorder, with recurrent episodes of passing out. Patient describes typical episode as being preceded by lightheadedness, at times associated with generalized body shaking that patient remembers. Patient had recent hospital admission, and had non-epileptic event captured during EEG.    Discussed with Dr. Da Silva    Plan:   - VEEG monitoring for further characterization and assessment  - Seizure precautions  - Ativan 2mg IV PRN for generalized tonic-clonic seizure lasting longer than 2 minutes, or two consecutive seizures without return to baseline in-between (ordered)  - CBC, CMP, Mg  - Keep Mg above 2  - Orthostatic vital signs  - cardiology evaluation for arrhythmias/syncope  - if VEEG (-), no further inpt neurologic w/u and f/u with Dr. Dumont in 4 wks    Plan discussed with patient in details, all questions answered.    Discussed with nursing team, hospitalist.

## 2022-01-26 NOTE — H&P ADULT - PROBLEM SELECTOR PLAN 1
admit to Telemetry  Cardiac evaluation  Neurology consult  trop x 2 more sets  ECHO  Orthostatic's   DVT prophylaxis admit to Telemetry  Cardiac evaluation  Neurology consult  trop x 2 more sets  ECHO  Orthostatic's   VEEG  DVT prophylaxis

## 2022-01-26 NOTE — ED PROVIDER NOTE - CLINICAL SUMMARY MEDICAL DECISION MAKING FREE TEXT BOX
36-year-old, F, patient, PMHx: Type I DM ( since the age of 16 months) on an insulin pump complicated by Diabetic neuropathy ,Muscle spasm HTN, chronic back pain, migraine headaches, recently admitted to the hospital fo DKA and COVID,Presents with third visit to ED for syncope EKG sinus tach, D-dimer negative Hgb 11, hyperglycemic 382 elevated beta hydroxy butyrate AG 18 HCO3 22 pH 7.34 IV push insulin 2 L LR No indication for insulin drip given beta hydroxybutyrate repeated 0.7 patient with Holter monitor to left chest patient concern for DC given third presentation for syncope patient admitted to LRT

## 2022-01-26 NOTE — ED ADULT NURSE REASSESSMENT NOTE - NS ED NURSE REASSESS COMMENT FT1
Pt. noted to have episode of shaking while on the phone, states she felt like she was going to pass out. V/S: /58, , SP O2 100%. FS= 340. Alert and responsive. MD Huff made aware. No new orders at this time. Monitoring ongoing.

## 2022-01-26 NOTE — H&P ADULT - NSHPPHYSICALEXAM_GEN_ALL_CORE
Vital Signs Last 24 Hrs    T(F): 98.5 (01-26-22 @ 02:17), Max: 98.5 (01-26-22 @ 02:17)  HR: 123 (01-26-22 @ 07:50) (110 - 125)  BP: 132/60 (01-26-22 @ 07:50)  RR: 18 (01-26-22 @ 07:50) (18 - 20)  SpO2: 100% (01-26-22 @ 07:50) (99% - 100%)  PHYSICAL EXAM:      Constitutional: NAD, A&O x3    Eyes: PERRLA    Respiratory: +air entry, no rales, no rhonchi, no wheezes    Cardiovascular: +S1 and S2, regular rate and rhythm    Gastrointestinal: +BS, soft, non-tender, not distended    Extremities:  no edema, no calf tenderness    Vascular: +dorsal pedis and radial pulses, no extremity cyanosis    Neurological: sensation intact, ROM equal B/L, CN II-XII intact    Skin: no rashes, normal turgor    ** Overweight

## 2022-01-26 NOTE — CONSULT NOTE ADULT - ASSESSMENT
35yo female whose PMH includes type 1 diabetes on insulin pump, was admitted for evaluation recurrent episodes of syncope (or near syncope).      Impression:  #Syncope(or near syncope)?        Plan:  -Pt c/o CP: appears Atypical. Doubt atheroembolic event. Troponin flat x1. Trend x2 more  -ECG: Sinus Tachy. No stable arrhthymias noted  -Check D-dimer  -TSH: (1/14/22)-1.89  -ECHO (12/24/2021): EF 56%. Normal global LV systolic function  -C/w home meds and ASA and Metoprolol  -Has MCOT in place. F/u with Dr Christian outpt  -F/u with Neuro recommendations    Discussed with Dr caballero

## 2022-01-26 NOTE — H&P ADULT - ASSESSMENT
syncope with known history of tachycardia- admit to telementry, if no cardiac issues found consider neurology evaluation / EEG    Type 1 DM on insulin - monitor on insulin pump     obesity- noted     neuropathy- noted

## 2022-01-26 NOTE — CONSULT NOTE ADULT - SUBJECTIVE AND OBJECTIVE BOX
HPI:  Hospitalist Note:   37yo female whose PMH includes type 1 diabetes on insulin pump and tachycardia, presents to ER after recurrent episodes of syncope (or near syncope). She reports that yesterday she had episode of body shaking. Saw her cardiologist who recommended she go to [facility] for psych evaluation. Tonight, while at home, she went into her parent's room where she apparently collapsed . At this point I left to obtain copy of medications from EMR to review but when I returned she was on the phone with her father hysterical crying and not stopping to talk to me    Medical PA Note:  Patient is a resident at [facility]: 7:30 AM yesterday fainted in her bathroom after a shower. Fainting spells recently since Christmas. Was admitted to [facility] about 1 1/2 weeks ago for evaluation. She was placed on a MCOT by the Cardiologist: Dr Hopper.   - she was taken to [facility] yesterday after the the above fainting spell: EKG, Labwork, CXR done. Seen by Neurologist: Dr Dash who recommends 24-48 hr EEG to r/o Seizure activity.  She was D/C back to [facility] but had a shaking episode: sent to [facility]: evaluated and discharged home again. At home she fell again and sent to [facility] for evaluation.  -    (26 Jan 2022 06:42)        HPI-Cardiology   Pt evaluated at bedside with Dr Teague. Currently admitted for evaluation of body shaking and recurrent syncopal episodes. Radiology tests and hospital records, were reviewed, as well as previous notes on this patient. Pt states that she was recently discharged from [facility] with episodes of "fainting, and body shakes". Was sent home experienced similar symptoms again and came back to the hospital ([facility]) for evaluation. Pt states she has an MCOT placed by her cardiologist Dr Christian and is supposed to come off on 2/15/2022. Pt endorses CP, and describes it as discomfort, non-radaiting, 4/10 in pain scale. Pt endorses palpitations but denies lightheadedness/dizziness.       PAST MEDICAL & SURGICAL HISTORY  Neuropathy  right lower extremity, vaginal    Type 1 diabetes    Degenerative disc disease, thoracic    Urinary tract infection, recurrent    Gastroesophageal reflux disease, esophagitis presence not specified    Intractable headache, unspecified chronicity pattern, unspecified headache type    Major depressive disorder with single episode, in full remission  previously treated with zyprexa, celexa and lexapro    Tremor of right hand    Tachycardia    Spinal stenosis        FAMILY HISTORY:  FAMILY HISTORY:  unknown    SOCIAL HISTORY:  []smoker-denies  []Alcohol-denies  []Drug-denies    ALLERGIES:  Ceclor (Rash)  clindamycin (Hives; Nephrotoxicity)  penicillins (Hives)      MEDICATIONS:  MEDICATIONS  (STANDING):  amitriptyline 75 milliGRAM(s) Oral at bedtime  aspirin  chewable 81 milliGRAM(s) Oral daily  chlorhexidine 4% Liquid 1 Application(s) Topical <User Schedule>  dextrose 40% Gel 15 Gram(s) Oral once  dextrose 5%. 1000 milliLiter(s) (50 mL/Hr) IV Continuous <Continuous>  dextrose 5%. 1000 milliLiter(s) (100 mL/Hr) IV Continuous <Continuous>  dextrose 50% Injectable 25 Gram(s) IV Push once  dextrose 50% Injectable 12.5 Gram(s) IV Push once  dextrose 50% Injectable 25 Gram(s) IV Push once  enoxaparin Injectable 40 milliGRAM(s) SubCutaneous daily  escitalopram 5 milliGRAM(s) Oral daily  glucagon  Injectable 1 milliGRAM(s) IntraMuscular once  insulin lispro (ADMELOG) corrective regimen sliding scale   SubCutaneous three times a day before meals  insulin lispro Injectable (ADMELOG) 5 Unit(s) SubCutaneous three times a day before meals  lactated ringers. 2000 milliLiter(s) (1000 mL/Hr) IV Continuous <Continuous>  metoprolol tartrate 25 milliGRAM(s) Oral two times a day  multivitamin 1 Tablet(s) Oral daily    MEDICATIONS  (PRN):  cyclobenzaprine 10 milliGRAM(s) Oral at bedtime PRN Muscle Spasm  ondansetron Injectable 4 milliGRAM(s) IV Push every 4 hours PRN Nausea and/or Vomiting      HOME MEDICATIONS:  Home Medications:  amitriptyline 75 mg oral tablet: 1 tab(s) orally once a day (at bedtime) (26 Jan 2022 07:51)  aspirin 81 mg oral tablet, chewable: 1 tab(s) orally once a day (26 Jan 2022 07:51)  cyclobenzaprine 10 mg oral tablet: 1 tab(s) orally once a day (at bedtime) (26 Jan 2022 07:51)  famotidine 40 mg oral tablet: 1 tab(s) orally 2 times a day (26 Jan 2022 07:51)  metoprolol tartrate 25 mg oral tablet: 1 tab(s) orally 2 times a day (26 Jan 2022 07:51)  multivitamin: 1 tab(s) orally once a day (26 Jan 2022 07:51)      VITALS:   T(F): 98.5 (01-26 @ 02:17), Max: 98.5 (01-26 @ 02:17)  HR: 126 (01-26 @ 09:44) (110 - 126)  BP: 115/58 (01-26 @ 09:26) (108/59 - 147/73)  BP(mean): --  RR: 18 (01-26 @ 09:44) (18 - 20)  SpO2: 100% (01-26 @ 09:44) (99% - 100%)    I&O's Summary      REVIEW OF SYSTEMS:  CONSTITUTIONAL: No weakness, fevers or chills  EYES: No visual changes  ENT: No vertigo or throat pain   NECK: No pain or stiffness  RESPIRATORY: No cough, wheezing, hemoptysis; No shortness of breath  CARDIOVASCULAR: Endorses CP and palpitations  GASTROINTESTINAL: No abdominal or epigastric pain. No nausea, vomiting, or hematemesis; No diarrhea or constipation. No melena or hematochezia.  GENITOURINARY: No dysuria, frequency or hematuria  NEUROLOGICAL: No numbness or weakness  SKIN: No itching, no rashes  MSK: no    PHYSICAL EXAM:  NEURO: patient is awake , alert and oriented  GEN: Not in acute distress  NECK: no thyroid enlargement, no JVD  LUNGS: Clear to auscultation bilaterally   CARDIOVASCULAR: S1/S2 present, Tachycardic , no murmurs or rubs, no carotid bruits,  + PP bilaterally  ABD: Soft, non-tender, non-distended, +BS  EXT: No KASSANDRA  SKIN: Intact    LABS:                        11.3   11.79 )-----------( 426      ( 26 Jan 2022 02:55 )             36.2     01-26    135  |  101  |  9<L>  ----------------------------<  146<H>  4.3   |  19  |  0.7    Ca    9.7      26 Jan 2022 05:25  Mg     1.8     01-26    TPro  7.9  /  Alb  4.5  /  TBili  0.4  /  DBili  x   /  AST  40  /  ALT  73<H>  /  AlkPhos  132<H>  01-26    PT/INR - ( 26 Jan 2022 02:55 )   PT: 14.20 sec;   INR: 1.24 ratio         PTT - ( 26 Jan 2022 02:55 )  PTT:35.8 sec  Troponin T, Serum: <0.01 ng/mL (01-26-22 @ 02:55)  Creatine Kinase, Serum: 58 U/L (01-26-22 @ 02:55)    CARDIAC MARKERS ( 26 Jan 2022 02:55 )  x     / <0.01 ng/mL / 58 U/L / x     / x            Serum Pro-Brain Natriuretic Peptide: 33 pg/mL (01-26-22 @ 02:55)          RADIOLOGY:  -CXR:  < from: Xray Chest 1 View- PORTABLE-Urgent (Xray Chest 1 View- PORTABLE-Urgent .) (01.26.22 @ 03:03) >  Impression:    No radiographic evidence of acute cardiopulmonary disease.        --- End of Report ---            NICOLAS MERIDA MD; Attending Radiologist  This document has been electronically signed. Jan 26 2022  8:08AM    < end of copied text >        ECHO:  < from: TTE Echo Complete w/o Contrast w/ Doppler (12.24.21 @ 07:28) >  ummary:   1. Normal global left ventricular systolic function.   2. LV Ejection Fraction by Leahy's Method with a biplane EF of 56 %.   3. Normal left ventricular internal cavity size.   4. The mean global longitudinal peak strain by speckle tracking is   -16.8% which is borderline normal.   5. Normal left atrial size.   6. Normal right atrial size.   7. No evidence of mitral valve regurgitation.   8. Mild tricuspid regurgitation.   9. LA volume Index is 21.2 ml/m² ml/m2.    < end of copied text >        ECG:  < from: 12 Lead ECG (01.25.22 @ 09:45) >  Sinus tachycardia  Possible Left atrial enlargement  Borderline ECG    < end of copied text >

## 2022-01-26 NOTE — H&P ADULT - PROBLEM SELECTOR PLAN 2
Has Insulin pump with Ademalog: will use Sliding scale while hospitalized.  Pump needs to be changed today Pump needs to be changed today  will start patient on lantus, and continue with ISS  Monitor POC

## 2022-01-26 NOTE — CONSULT NOTE ADULT - SUBJECTIVE AND OBJECTIVE BOX
Neurology/Epilepsy Consult:    CRISTI MONTGOMERY 36y Female  MRN-[id number]    Patient is a 36y old right-handed Female who presents with a chief complaint of LOC      HPI: History obtained from patient. EMR reviewed.  Patient presents to ER for evaluation of recurrent episodes of syncope/near syncope.   Yesterday morning she fainted at her residence after taking a shower. Patient describes this episode as feeling very lightheaded with bilateral legs weakness, followed by fall but no LOC. Patient was taken to ED [place] for evaluation, was not admitted. After returning to her residence in the afternoon patient had an episode of typical " seizure" that patient recalls. She reports having on and off body shaking that she recalls. House doctor recommended sending her to [facility] for evaluation by psychiatrist. Patient was cleared for discharge by psych, but upon return to her residence fainted again and fell.  Patient is being followed as outpatient by Dr. Dumont for headaches and back pain.  She reports multiple prior episodes that were similar and most were associated with LOC of unclear duration. During recent admission to [facility] for LOC she had REEG during which nonepileptic event was captured (see below). She also had 1 prior VEEG in 2016 that was normal with one LIDA.        PAST MEDICAL & SURGICAL HISTORY:  Neuropathy  Type 1 diabetes  Degenerative disc disease  Urinary tract infection, recurrent  Gastroesophageal reflux disease  Migraine headaches  Mood disorder  Tachycardia        FAMILY HISTORY:  No seizures        Social History:  Lives at assisted living facility  Denies smoking/ETOH/illicit drug use        Risk factors:  No febrile seizures/CNS infections  No head trauma with loss of consciousness      Allergy:  Ceclor (Rash)  Cipro (Other)  clindamycin (Hives; Nephrotoxicity)  gabapentin (Other)  Influenza Virus Vaccine, H5N1, Inactivated (Other)  penicillins (Hives)        Home Medications:  amitriptyline 75 mg oral tablet: 1 tab(s) orally once a day (at bedtime) (2022 07:51)  aspirin 81 mg oral tablet, chewable: 1 tab(s) orally once a day (2022 07:51)  cyclobenzaprine 10 mg oral tablet: 1 tab(s) orally once a day (at bedtime) (2022 07:51)  famotidine 40 mg oral tablet: 1 tab(s) orally 2 times a day (2022 07:51)  metoprolol tartrate 25 mg oral tablet: 1 tab(s) orally 2 times a day (2022 07:51)  multivitamin: 1 tab(s) orally once a day (2022 07:51)        MEDICATIONS  (STANDING):  amitriptyline 75 milliGRAM(s) Oral at bedtime  aspirin  chewable 81 milliGRAM(s) Oral daily  chlorhexidine 4% Liquid 1 Application(s) Topical <User Schedule>  dextrose 40% Gel 15 Gram(s) Oral once  dextrose 5%. 1000 milliLiter(s) (50 mL/Hr) IV Continuous <Continuous>  dextrose 5%. 1000 milliLiter(s) (100 mL/Hr) IV Continuous <Continuous>  dextrose 50% Injectable 25 Gram(s) IV Push once  dextrose 50% Injectable 12.5 Gram(s) IV Push once  dextrose 50% Injectable 25 Gram(s) IV Push once  enoxaparin Injectable 40 milliGRAM(s) SubCutaneous daily  escitalopram 10 milliGRAM(s) Oral daily  glucagon  Injectable 1 milliGRAM(s) IntraMuscular once  insulin lispro (ADMELOG) corrective regimen sliding scale   SubCutaneous three times a day before meals  insulin lispro Injectable (ADMELOG) 5 Unit(s) SubCutaneous three times a day before meals  metoprolol tartrate 25 milliGRAM(s) Oral every 12 hours  multivitamin 1 Tablet(s) Oral daily    MEDICATIONS  (PRN):  cyclobenzaprine 10 milliGRAM(s) Oral at bedtime PRN Muscle Spasm  hydrOXYzine hydrochloride 50 milliGRAM(s) Oral every 6 hours PRN Anxiety  ondansetron Injectable 4 milliGRAM(s) IV Push every 4 hours PRN Nausea and/or Vomiting          T(F): 98.9 (22 @ 14:21), Max: 98.9 (22 @ 14:21)  HR: 130 (22 @ 14:21) (119 - 130)  BP: 130/82 (22 @ 14:21) (108/59 - 145/77)  RR: 18 (22 @ 14:21) (18 - 18)  SpO2: 100% (22 @ 14:21) (100% - 100%)        Neurologic Examination:  General:  Appearance is consistent with chronologic age.  No abnormal facies.   General: The patient is oriented to person, place, time and date.  Recent and remote memory intact.  Follows 4-step directions. Fund of knowledge is intact and normal.  Language with normal repetition, comprehension and naming.  Nondysarthric.    Cranial nerves: EOMI w/o nystagmus, skew or reported double vision.  PERRL.  No ptosis/weakness of eyelid closure.  Facial sensation is normal with normal bite.  No facial asymmetry.  Hearing grossly intact b/l.  Palate elevates midline.  Tongue midline.  Motor examination:   Normal tone, bulk and range of motion.  No tenderness, twitching, tremors or involuntary movements.  Formal Muscle Strength Testin/5 UE; 5/5 LE.  No observable drift.  Reflexes:   !+ b/l   Sensory examination:   Intact to touch and pain.  Cerebellum:   FTN/HKS intact with normal MARIN in all limbs.  No dysmetria or dysdiadokinesia.  Gait examination deferred.        Labs:                         11.3   11.79 )-----------( 426      ( 2022 02:55 )             36.2         135  |  101  |  9<L>  ----------------------------<  146<H>  4.3   |  19  |  0.7    Ca    9.7      2022 05:25  Mg     1.8         TPro  7.9  /  Alb  4.5  /  TBili  0.4  /  DBili  x   /  AST  40  /  ALT  73<H>  /  AlkPhos  132<H>      LIVER FUNCTIONS - ( 2022 02:55 )  Alb: 4.5 g/dL / Pro: 7.9 g/dL / ALK PHOS: 132 U/L / ALT: 73 U/L / AST: 40 U/L / GGT: x           PT/INR - ( 2022 02:55 )   PT: 14.20 sec;   INR: 1.24 ratio         PTT - ( 2022 02:55 )  PTT:35.8 sec  Urinalysis Basic - ( 2022 04:20 )    Color: Yellow / Appearance: Clear / S.025 / pH: x  Gluc: x / Ketone: 160  / Bili: Negative / Urobili: 0.2 mg/dL   Blood: x / Protein: Negative mg/dL / Nitrite: Negative   Leuk Esterase: Negative / RBC: x / WBC x   Sq Epi: x / Non Sq Epi: x / Bacteria: x          Serum Pregnancy: Negative (22 @ 02:55)        COVID-19 PCR: Detected (22 @ 02:55)  COVID-19 PCR: Detected (22 @ 17:25)  COVID-19 PCR: Detected (22 @ 15:22)  COVID-19 PCR: NotDetec (22 @ 11:47)  COVID-19 PCR: Detected (22 @ 13:54)  COVID-19 PCR: Detected (22 @ 15:21)  COVID-19 PCR: Detected (21 @ 21:46)  COVID-19 PCR: NotDetec (21 @ 09:46)  COVID-19 PCR: NotDetec (21 @ 09:56)  COVID-19 PCR: NotDetec (21 @ 10:00)          Neuroimaging:  CT Head: CT Head No Cont:   ACC: [id number] EXAM:  CT BRAIN                          PROCEDURE DATE:  2022      INTERPRETATION:  Clinical History / Reason for exam: Trauma.    Technique: Multiple axial CT images were obtained from the base of skull   to the vertex without the administration of IV contrast.  Coronal and   Sagittal reformations provided.    Comparison: 2016.    Findings:    The ventricular system, basal cisterns, and sulcal pattern are   appropriate for patients age.    There is no acute extra-axial collection.  No mass effect, midline shift   or acute hemorrhage is seen.    Gray-white differentiation appears preserved.    There is no depressed skull fracture.    The paranasal sinuses and mastoid air cells are well-aerated.    Impression:    No acute intracranial abnormality.    --- End of Report ---          EEG:     EXAM:  EEG AWAKE AND ASLEEP      PROCEDURE DATE:  2022      INTERPRETATION:  Exam Performed on: 16 Channel Digital Routine EEG done   on [employer] recording system.    History  Possible Seizures  HEADACHES, NEUROPATHY, SPINAL STENOSIS, DM, UTI, TACHYCARDIA, DEPRESSION    Medications    Medication  Date  <Medication>FLEXERIL  <Date>  <Medication>TYLENOL  <Date>  <Medication>LOPRESSOR  <Date>  <Medication>CORLANTOR  <Date>  <Medication>LANTUS  <Date>  <Medication>ELAVIL  <Date>  <Medication>LOVENOX  <Date>  <Medication>LEXAPRO  <Date>  <Medication>PEPCID  <Date>  <Medication>Aspirin  <Date>  <Medication>INSULIN  <Date>    State  Awake, Unresponsive    Symmetry  Symmetric    Organization  Well organized    PDR  Continuous  Background: 10 hz    Generalized Slowing  No    Focal Slowing  No    Breach Artifact:  No    Activation Procedure  Hyper Ventilation  No    Photic Stimulation  No      Epileptiform Activity  No    Events:  Yes  clinical  Rapid head shaking up and down. Movement artifact on EEG.    Impression:  Normal      Clinical Correlation & Recommendations  Consistent with non-epileptic event. A normal EEG does not exclude the   possibility of seizure disorder. Clinical correlation is recommended.    Signed by:  Laina Ellington    --- End of Report ---      REEG 2021 - normal    VEEG x 24hrs 2016 - normal, 1 non-epileptic event captured          Assessment:  This is a 36y Female who is COVID +, with h/o type 1 DM, HTN, chronic back pain, peripheral neuropathy, migraine headaches, mood disorder, with recurrent episodes of passing out. Patient describes typical episode as being preceded by lightheadedness, at times associated with generalized body shaking that patient remembers. Patient had recent hospital admission, and had non-epileptic event captured during EEG.        Discussed with Dr. Da Silva        Plan:   - VEEG monitoring for further characterization and assessment  - Seizure precautions  - Ativan 2mg IV PRN for generalized tonic-clonic seizure lasting longer than 2 minutes, or two consecutive seizures without return to baseline in-between (ordered)  - CBC, CMP, Mg  - Keep Mg above 2  - Orthostatic vital signs    Plan discussed with patient in details, all questions answered.    Discussed with nursing team, hospitalist. Neurology/Epilepsy Consult:    CRISTI MONTGOMERY 36y Female  MRN-[id number]    Patient is a 36y old right-handed Female who presents with a chief complaint of LOC      HPI: History obtained from patient. EMR reviewed.  Patient presents to ER for evaluation of recurrent episodes of LOC/near LOC.   Yesterday morning she fainted at her residence after taking a shower. Patient describes this episode as feeling very lightheaded with bilateral legs weakness, followed by fall but no LOC. Patient was taken to ED [place] for evaluation, was not admitted. After returning to her residence in the afternoon patient had an episode of typical " seizure" that patient recalls. She reports having on and off body shaking that she recalls. House doctor recommended sending her to [facility] for evaluation by psychiatrist. Patient was cleared for discharge by psych, but upon return to her residence fainted again and fell.  Patient is being followed as outpatient by Dr. Dumont for headaches and back pain.  She reports multiple prior episodes that were similar and most were associated with LOC of unclear duration. During recent admission to [facility] for LOC she had REEG during which nonepileptic event was captured (see below). She also had 1 prior VEEG in 2016 that was normal with one LIDA.        PAST MEDICAL & SURGICAL HISTORY:  Neuropathy  Type 1 diabetes  Degenerative disc disease  Urinary tract infection, recurrent  Gastroesophageal reflux disease  Migraine headaches  Mood disorder  Tachycardia        FAMILY HISTORY:  No seizures        Social History:  Lives at assisted living facility  Denies smoking/ETOH/illicit drug use        Risk factors:  No febrile seizures/CNS infections  No head trauma with loss of consciousness      Allergy:  Ceclor (Rash)  Cipro (Other)  clindamycin (Hives; Nephrotoxicity)  gabapentin (Other)  Influenza Virus Vaccine, H5N1, Inactivated (Other)  penicillins (Hives)        Home Medications:  amitriptyline 75 mg oral tablet: 1 tab(s) orally once a day (at bedtime) (2022 07:51)  aspirin 81 mg oral tablet, chewable: 1 tab(s) orally once a day (2022 07:51)  cyclobenzaprine 10 mg oral tablet: 1 tab(s) orally once a day (at bedtime) (2022 07:51)  famotidine 40 mg oral tablet: 1 tab(s) orally 2 times a day (2022 07:51)  metoprolol tartrate 25 mg oral tablet: 1 tab(s) orally 2 times a day (2022 07:51)  multivitamin: 1 tab(s) orally once a day (2022 07:51)        MEDICATIONS  (STANDING):  amitriptyline 75 milliGRAM(s) Oral at bedtime  aspirin  chewable 81 milliGRAM(s) Oral daily  chlorhexidine 4% Liquid 1 Application(s) Topical <User Schedule>  dextrose 40% Gel 15 Gram(s) Oral once  dextrose 5%. 1000 milliLiter(s) (50 mL/Hr) IV Continuous <Continuous>  dextrose 5%. 1000 milliLiter(s) (100 mL/Hr) IV Continuous <Continuous>  dextrose 50% Injectable 25 Gram(s) IV Push once  dextrose 50% Injectable 12.5 Gram(s) IV Push once  dextrose 50% Injectable 25 Gram(s) IV Push once  enoxaparin Injectable 40 milliGRAM(s) SubCutaneous daily  escitalopram 10 milliGRAM(s) Oral daily  glucagon  Injectable 1 milliGRAM(s) IntraMuscular once  insulin lispro (ADMELOG) corrective regimen sliding scale   SubCutaneous three times a day before meals  insulin lispro Injectable (ADMELOG) 5 Unit(s) SubCutaneous three times a day before meals  metoprolol tartrate 25 milliGRAM(s) Oral every 12 hours  multivitamin 1 Tablet(s) Oral daily    MEDICATIONS  (PRN):  cyclobenzaprine 10 milliGRAM(s) Oral at bedtime PRN Muscle Spasm  hydrOXYzine hydrochloride 50 milliGRAM(s) Oral every 6 hours PRN Anxiety  ondansetron Injectable 4 milliGRAM(s) IV Push every 4 hours PRN Nausea and/or Vomiting          T(F): 98.9 (22 @ 14:21), Max: 98.9 (22 @ 14:21)  HR: 130 (22 @ 14:21) (119 - 130)  BP: 130/82 (22 @ 14:21) (108/59 - 145/77)  RR: 18 (22 @ 14:21) (18 - 18)  SpO2: 100% (22 @ 14:21) (100% - 100%)        Neurologic Examination:  General:  Appearance is consistent with chronologic age.  No abnormal facies.   General: The patient is oriented to person, place, time and date.  Recent and remote memory intact.  Follows 4-step directions. Fund of knowledge is intact and normal.  Language with normal repetition, comprehension and naming.  Nondysarthric.    Cranial nerves: EOMI w/o nystagmus, skew or reported double vision.  PERRL.  No ptosis/weakness of eyelid closure.  Facial sensation is normal with normal bite.  No facial asymmetry.  Hearing grossly intact b/l.  Palate elevates midline.  Tongue midline.  Motor examination:   Normal tone, bulk and range of motion.  No tenderness, twitching, tremors or involuntary movements.  Formal Muscle Strength Testin/5 UE; 5/5 LE.  No observable drift.  Reflexes:   !+ b/l   Sensory examination:   Intact to touch and pain.  Cerebellum:   FTN/HKS intact with normal MARIN in all limbs.  No dysmetria or dysdiadokinesia.  Gait examination deferred.        Labs:                         11.3   11.79 )-----------( 426      ( 2022 02:55 )             36.2         135  |  101  |  9<L>  ----------------------------<  146<H>  4.3   |  19  |  0.7    Ca    9.7      2022 05:25  Mg     1.8         TPro  7.9  /  Alb  4.5  /  TBili  0.4  /  DBili  x   /  AST  40  /  ALT  73<H>  /  AlkPhos  132<H>      LIVER FUNCTIONS - ( 2022 02:55 )  Alb: 4.5 g/dL / Pro: 7.9 g/dL / ALK PHOS: 132 U/L / ALT: 73 U/L / AST: 40 U/L / GGT: x           PT/INR - ( 2022 02:55 )   PT: 14.20 sec;   INR: 1.24 ratio         PTT - ( 2022 02:55 )  PTT:35.8 sec  Urinalysis Basic - ( 2022 04:20 )    Color: Yellow / Appearance: Clear / S.025 / pH: x  Gluc: x / Ketone: 160  / Bili: Negative / Urobili: 0.2 mg/dL   Blood: x / Protein: Negative mg/dL / Nitrite: Negative   Leuk Esterase: Negative / RBC: x / WBC x   Sq Epi: x / Non Sq Epi: x / Bacteria: x          Serum Pregnancy: Negative (22 @ 02:55)        COVID-19 PCR: Detected (22 @ 02:55)  COVID-19 PCR: Detected (22 @ 17:25)  COVID-19 PCR: Detected (22 @ 15:22)  COVID-19 PCR: NotDetec (22 @ 11:47)  COVID-19 PCR: Detected (22 @ 13:54)  COVID-19 PCR: Detected (22 @ 15:21)  COVID-19 PCR: Detected (21 @ 21:46)  COVID-19 PCR: NotDetec (21 @ 09:46)  COVID-19 PCR: NotDetec (21 @ 09:56)  COVID-19 PCR: NotDetec (21 @ 10:00)          Neuroimaging:  CT Head: CT Head No Cont:   ACC: [id number] EXAM:  CT BRAIN                          PROCEDURE DATE:  2022      INTERPRETATION:  Clinical History / Reason for exam: Trauma.    Technique: Multiple axial CT images were obtained from the base of skull   to the vertex without the administration of IV contrast.  Coronal and   Sagittal reformations provided.    Comparison: 2016.    Findings:    The ventricular system, basal cisterns, and sulcal pattern are   appropriate for patients age.    There is no acute extra-axial collection.  No mass effect, midline shift   or acute hemorrhage is seen.    Gray-white differentiation appears preserved.    There is no depressed skull fracture.    The paranasal sinuses and mastoid air cells are well-aerated.    Impression:    No acute intracranial abnormality.    --- End of Report ---          EEG:     EXAM:  EEG AWAKE AND ASLEEP      PROCEDURE DATE:  2022      INTERPRETATION:  Exam Performed on: 16 Channel Digital Routine EEG done   on [employer] recording system.    History  Possible Seizures  HEADACHES, NEUROPATHY, SPINAL STENOSIS, DM, UTI, TACHYCARDIA, DEPRESSION    Medications    Medication  Date  <Medication>FLEXERIL  <Date>  <Medication>TYLENOL  <Date>  <Medication>LOPRESSOR  <Date>  <Medication>CORLANTOR  <Date>  <Medication>LANTUS  <Date>  <Medication>ELAVIL  <Date>  <Medication>LOVENOX  <Date>  <Medication>LEXAPRO  <Date>  <Medication>PEPCID  <Date>  <Medication>Aspirin  <Date>  <Medication>INSULIN  <Date>    State  Awake, Unresponsive    Symmetry  Symmetric    Organization  Well organized    PDR  Continuous  Background: 10 hz    Generalized Slowing  No    Focal Slowing  No    Breach Artifact:  No    Activation Procedure  Hyper Ventilation  No    Photic Stimulation  No      Epileptiform Activity  No    Events:  Yes  clinical  Rapid head shaking up and down. Movement artifact on EEG.    Impression:  Normal      Clinical Correlation & Recommendations  Consistent with non-epileptic event. A normal EEG does not exclude the   possibility of seizure disorder. Clinical correlation is recommended.    Signed by:  Laina Ellington    --- End of Report ---      REEG 2021 - normal    VEEG x 24hrs 2016 - normal, 1 non-epileptic event captured          Assessment:  This is a 36y Female who is COVID +, with h/o type 1 DM, HTN, chronic back pain, peripheral neuropathy, migraine headaches, mood disorder, with recurrent episodes of passing out. Patient describes typical episode as being preceded by lightheadedness, at times associated with generalized body shaking that patient remembers. Patient had recent hospital admission, and had non-epileptic event captured during EEG.        Discussed with Dr. Da Silva        Plan:   - VEEG monitoring for further characterization and assessment  - Seizure precautions  - Ativan 2mg IV PRN for generalized tonic-clonic seizure lasting longer than 2 minutes, or two consecutive seizures without return to baseline in-between (ordered)  - CBC, CMP, Mg  - Keep Mg above 2  - Orthostatic vital signs    Plan discussed with patient in details, all questions answered.    Discussed with nursing team, hospitalist. Neurology/Epilepsy Consult:    CRISTI MONTGOMERY 36y Female  MRN-[id number]    Patient is a 36y old right-handed Female who presents with a chief complaint of LOC      HPI: History obtained from patient. EMR reviewed.  Patient presents to ER for evaluation of recurrent episodes of LOC/near LOC.   Yesterday morning she fainted at her residence after taking a shower. Patient describes this episode as feeling very lightheaded with bilateral legs weakness, followed by fall but no LOC. Patient was taken to ED [place] for evaluation, was not admitted. After returning to her residence in the afternoon patient had an episode of typical " seizure" that patient recalls. She reports having on and off body shaking that she recalls. House doctor recommended sending her to [facility] for evaluation by psychiatrist. Patient was cleared for discharge by psych, but upon return to her residence fainted again and fell.  Patient is being followed as outpatient by Dr. Dumont for headaches and back pain.  She reports multiple prior episodes that were similar and most were associated with LOC of unclear duration. During recent admission to [facility] for LOC she had REEG during which nonepileptic event was captured (see below). She also had 1 prior VEEG in 2016 that was normal with one LIDA.        PAST MEDICAL & SURGICAL HISTORY:  Neuropathy  Type 1 diabetes  Degenerative disc disease  Urinary tract infection, recurrent  Gastroesophageal reflux disease  Migraine headaches  Mood disorder  Tachycardia        FAMILY HISTORY:  No seizures        Social History:  Lives at assisted living facility  Denies smoking/ETOH/illicit drug use        Risk factors:  No febrile seizures/CNS infections  No head trauma with loss of consciousness      Allergy:  Ceclor (Rash)  Cipro (Other)  clindamycin (Hives; Nephrotoxicity)  gabapentin (Other)  Influenza Virus Vaccine, H5N1, Inactivated (Other)  penicillins (Hives)        Home Medications:  amitriptyline 75 mg oral tablet: 1 tab(s) orally once a day (at bedtime) (2022 07:51)  aspirin 81 mg oral tablet, chewable: 1 tab(s) orally once a day (2022 07:51)  cyclobenzaprine 10 mg oral tablet: 1 tab(s) orally once a day (at bedtime) (2022 07:51)  famotidine 40 mg oral tablet: 1 tab(s) orally 2 times a day (2022 07:51)  metoprolol tartrate 25 mg oral tablet: 1 tab(s) orally 2 times a day (2022 07:51)  multivitamin: 1 tab(s) orally once a day (2022 07:51)        MEDICATIONS  (STANDING):  amitriptyline 75 milliGRAM(s) Oral at bedtime  aspirin  chewable 81 milliGRAM(s) Oral daily  chlorhexidine 4% Liquid 1 Application(s) Topical <User Schedule>  dextrose 40% Gel 15 Gram(s) Oral once  dextrose 5%. 1000 milliLiter(s) (50 mL/Hr) IV Continuous <Continuous>  dextrose 5%. 1000 milliLiter(s) (100 mL/Hr) IV Continuous <Continuous>  dextrose 50% Injectable 25 Gram(s) IV Push once  dextrose 50% Injectable 12.5 Gram(s) IV Push once  dextrose 50% Injectable 25 Gram(s) IV Push once  enoxaparin Injectable 40 milliGRAM(s) SubCutaneous daily  escitalopram 10 milliGRAM(s) Oral daily  glucagon  Injectable 1 milliGRAM(s) IntraMuscular once  insulin lispro (ADMELOG) corrective regimen sliding scale   SubCutaneous three times a day before meals  insulin lispro Injectable (ADMELOG) 5 Unit(s) SubCutaneous three times a day before meals  metoprolol tartrate 25 milliGRAM(s) Oral every 12 hours  multivitamin 1 Tablet(s) Oral daily    MEDICATIONS  (PRN):  cyclobenzaprine 10 milliGRAM(s) Oral at bedtime PRN Muscle Spasm  hydrOXYzine hydrochloride 50 milliGRAM(s) Oral every 6 hours PRN Anxiety  ondansetron Injectable 4 milliGRAM(s) IV Push every 4 hours PRN Nausea and/or Vomiting          T(F): 98.9 (22 @ 14:21), Max: 98.9 (22 @ 14:21)  HR: 130 (22 @ 14:21) (119 - 130)  BP: 130/82 (22 @ 14:21) (108/59 - 145/77)  RR: 18 (22 @ 14:21) (18 - 18)  SpO2: 100% (22 @ 14:21) (100% - 100%)        Neurologic Examination:  General:  Appearance is consistent with chronologic age.  No abnormal facies.   General: The patient is oriented to person, place, time and date.  Recent and remote memory intact.  Follows 4-step directions. Fund of knowledge is intact and normal.  Language with normal repetition, comprehension and naming.  Nondysarthric.    Cranial nerves: EOMI w/o nystagmus, skew or reported double vision.  PERRL.  No ptosis/weakness of eyelid closure.  Facial sensation is normal with normal bite.  No facial asymmetry.  Hearing grossly intact b/l.  Palate elevates midline.  Tongue midline.  Motor examination:   Normal tone, bulk and range of motion.  No tenderness, twitching, tremors or involuntary movements.  Formal Muscle Strength Testin/5 UE; 5/5 LE.  No observable drift.  Reflexes:   !+ b/l   Sensory examination:   Intact to touch and pain.  Cerebellum:   FTN/HKS intact with normal MARIN in all limbs.  No dysmetria or dysdiadokinesia.  Gait examination deferred.        Labs:                         11.3   11.79 )-----------( 426      ( 2022 02:55 )             36.2         135  |  101  |  9<L>  ----------------------------<  146<H>  4.3   |  19  |  0.7    Ca    9.7      2022 05:25  Mg     1.8         TPro  7.9  /  Alb  4.5  /  TBili  0.4  /  DBili  x   /  AST  40  /  ALT  73<H>  /  AlkPhos  132<H>      LIVER FUNCTIONS - ( 2022 02:55 )  Alb: 4.5 g/dL / Pro: 7.9 g/dL / ALK PHOS: 132 U/L / ALT: 73 U/L / AST: 40 U/L / GGT: x           PT/INR - ( 2022 02:55 )   PT: 14.20 sec;   INR: 1.24 ratio         PTT - ( 2022 02:55 )  PTT:35.8 sec  Urinalysis Basic - ( 2022 04:20 )    Color: Yellow / Appearance: Clear / S.025 / pH: x  Gluc: x / Ketone: 160  / Bili: Negative / Urobili: 0.2 mg/dL   Blood: x / Protein: Negative mg/dL / Nitrite: Negative   Leuk Esterase: Negative / RBC: x / WBC x   Sq Epi: x / Non Sq Epi: x / Bacteria: x          Serum Pregnancy: Negative (22 @ 02:55)        COVID-19 PCR: Detected (22 @ 02:55)  COVID-19 PCR: Detected (22 @ 17:25)  COVID-19 PCR: Detected (22 @ 15:22)  COVID-19 PCR: NotDetec (22 @ 11:47)  COVID-19 PCR: Detected (22 @ 13:54)  COVID-19 PCR: Detected (22 @ 15:21)  COVID-19 PCR: Detected (21 @ 21:46)  COVID-19 PCR: NotDetec (21 @ 09:46)  COVID-19 PCR: NotDetec (21 @ 09:56)  COVID-19 PCR: NotDetec (21 @ 10:00)          Neuroimaging:  CT Head: CT Head No Cont:   ACC: [id number] EXAM:  CT BRAIN                          PROCEDURE DATE:  2022      INTERPRETATION:  Clinical History / Reason for exam: Trauma.    Technique: Multiple axial CT images were obtained from the base of skull   to the vertex without the administration of IV contrast.  Coronal and   Sagittal reformations provided.    Comparison: 2016.    Findings:    The ventricular system, basal cisterns, and sulcal pattern are   appropriate for patients age.    There is no acute extra-axial collection.  No mass effect, midline shift   or acute hemorrhage is seen.    Gray-white differentiation appears preserved.    There is no depressed skull fracture.    The paranasal sinuses and mastoid air cells are well-aerated.    Impression:    No acute intracranial abnormality.    --- End of Report ---          EEG:     EXAM:  EEG AWAKE AND ASLEEP      PROCEDURE DATE:  2022      INTERPRETATION:  Exam Performed on: 16 Channel Digital Routine EEG done   on [email or web address] recording system.    History  Possible Seizures  HEADACHES, NEUROPATHY, SPINAL STENOSIS, DM, UTI, TACHYCARDIA, DEPRESSION    Medications    Medication  Date  <Medication>FLEXERIL  <Date>  <Medication>TYLENOL  <Date>  <Medication>LOPRESSOR  <Date>  <Medication>CORLANTOR  <Date>  <Medication>LANTUS  <Date>  <Medication>ELAVIL  <Date>  <Medication>LOVENOX  <Date>  <Medication>LEXAPRO  <Date>  <Medication>PEPCID  <Date>  <Medication>Aspirin  <Date>  <Medication>INSULIN  <Date>    State  Awake, Unresponsive    Symmetry  Symmetric    Organization  Well organized    PDR  Continuous  Background: 10 hz    Generalized Slowing  No    Focal Slowing  No    Breach Artifact:  No    Activation Procedure  Hyper Ventilation  No    Photic Stimulation  No      Epileptiform Activity  No    Events:  Yes  clinical  Rapid head shaking up and down. Movement artifact on EEG.    Impression:  Normal      Clinical Correlation & Recommendations  Consistent with non-epileptic event. A normal EEG does not exclude the   possibility of seizure disorder. Clinical correlation is recommended.    Signed by:  Laina Ellington    --- End of Report ---      REEG 2021 - normal    VEEG x 24hrs 2016 - normal, 1 non-epileptic event captured

## 2022-01-27 LAB
GLUCOSE BLDC GLUCOMTR-MCNC: 277 MG/DL — HIGH (ref 70–99)
GLUCOSE BLDC GLUCOMTR-MCNC: 278 MG/DL — HIGH (ref 70–99)
GLUCOSE BLDC GLUCOMTR-MCNC: 286 MG/DL — HIGH (ref 70–99)
GLUCOSE BLDC GLUCOMTR-MCNC: 367 MG/DL — HIGH (ref 70–99)
GLUCOSE BLDC GLUCOMTR-MCNC: 394 MG/DL — HIGH (ref 70–99)

## 2022-01-27 PROCEDURE — 99222 1ST HOSP IP/OBS MODERATE 55: CPT

## 2022-01-27 PROCEDURE — 99232 SBSQ HOSP IP/OBS MODERATE 35: CPT

## 2022-01-27 PROCEDURE — 99231 SBSQ HOSP IP/OBS SF/LOW 25: CPT

## 2022-01-27 PROCEDURE — 99252 IP/OBS CONSLTJ NEW/EST SF 35: CPT

## 2022-01-27 PROCEDURE — 93306 TTE W/DOPPLER COMPLETE: CPT | Mod: 26

## 2022-01-27 PROCEDURE — 95720 EEG PHY/QHP EA INCR W/VEEG: CPT

## 2022-01-27 RX ORDER — INSULIN GLARGINE 100 [IU]/ML
10 INJECTION, SOLUTION SUBCUTANEOUS EVERY MORNING
Refills: 0 | Status: DISCONTINUED | OUTPATIENT
Start: 2022-01-27 | End: 2022-01-27

## 2022-01-27 RX ORDER — SODIUM CHLORIDE 9 MG/ML
1000 INJECTION INTRAMUSCULAR; INTRAVENOUS; SUBCUTANEOUS
Refills: 0 | Status: DISCONTINUED | OUTPATIENT
Start: 2022-01-27 | End: 2022-01-28

## 2022-01-27 RX ORDER — INSULIN GLARGINE 100 [IU]/ML
20 INJECTION, SOLUTION SUBCUTANEOUS EVERY MORNING
Refills: 0 | Status: DISCONTINUED | OUTPATIENT
Start: 2022-01-27 | End: 2022-01-27

## 2022-01-27 RX ORDER — METOPROLOL TARTRATE 50 MG
25 TABLET ORAL EVERY 8 HOURS
Refills: 0 | Status: DISCONTINUED | OUTPATIENT
Start: 2022-01-27 | End: 2022-01-31

## 2022-01-27 RX ORDER — INSULIN GLARGINE 100 [IU]/ML
25 INJECTION, SOLUTION SUBCUTANEOUS EVERY MORNING
Refills: 0 | Status: DISCONTINUED | OUTPATIENT
Start: 2022-01-27 | End: 2022-01-28

## 2022-01-27 RX ORDER — PANTOPRAZOLE SODIUM 20 MG/1
40 TABLET, DELAYED RELEASE ORAL
Refills: 0 | Status: DISCONTINUED | OUTPATIENT
Start: 2022-01-27 | End: 2022-01-31

## 2022-01-27 RX ADMIN — ONDANSETRON 4 MILLIGRAM(S): 8 TABLET, FILM COATED ORAL at 15:58

## 2022-01-27 RX ADMIN — Medication 5: at 16:18

## 2022-01-27 RX ADMIN — Medication 5: at 07:56

## 2022-01-27 RX ADMIN — INSULIN GLARGINE 25 UNIT(S): 100 INJECTION, SOLUTION SUBCUTANEOUS at 16:18

## 2022-01-27 RX ADMIN — Medication 81 MILLIGRAM(S): at 18:02

## 2022-01-27 RX ADMIN — Medication 25 MILLIGRAM(S): at 21:46

## 2022-01-27 RX ADMIN — Medication 75 MILLIGRAM(S): at 21:46

## 2022-01-27 RX ADMIN — Medication 25 MILLIGRAM(S): at 15:47

## 2022-01-27 RX ADMIN — Medication 5 UNIT(S): at 07:57

## 2022-01-27 RX ADMIN — Medication 25 MILLIGRAM(S): at 05:34

## 2022-01-27 RX ADMIN — ONDANSETRON 4 MILLIGRAM(S): 8 TABLET, FILM COATED ORAL at 21:47

## 2022-01-27 RX ADMIN — SODIUM CHLORIDE 100 MILLILITER(S): 9 INJECTION INTRAMUSCULAR; INTRAVENOUS; SUBCUTANEOUS at 20:29

## 2022-01-27 RX ADMIN — PANTOPRAZOLE SODIUM 40 MILLIGRAM(S): 20 TABLET, DELAYED RELEASE ORAL at 16:19

## 2022-01-27 RX ADMIN — ESCITALOPRAM OXALATE 10 MILLIGRAM(S): 10 TABLET, FILM COATED ORAL at 18:02

## 2022-01-27 RX ADMIN — Medication 5 UNIT(S): at 16:19

## 2022-01-27 NOTE — CONSULT NOTE ADULT - SUBJECTIVE AND OBJECTIVE BOX
HPI:     37yo female whose PMH includes type 1 diabetes on insulin pump and tachycardia, presents to ER after recurrent episodes of syncope (or near syncope). She reports that yesterday she had episode of body shaking. Saw her cardiologist who recommended she go to [facility] for psych evaluation. Tonight, while at home, she went into her parent's room where she apparently collapsed . At this point I left to obtain copy of medications from EMR to review but when I returned she was on the phone with her father hysterical crying and not stopping to talk to me    Medical PA Note:  Patient is a resident at [facility]: 7:30 AM yesterday fainted in her bathroom after a shower. Fainting spells recently since Christmas. Was admitted to [facility] about 1 1/2 weeks ago for evaluation. She was placed on a MCOT by the Cardiologist: Dr Hopper.   - she was taken to [facility] yesterday after the the above fainting spell: EKG, Labwork, CXR done. Seen by Neurologist: Dr Dash who recommends 24-48 hr EEG to r/o Seizure activity.  She was D/C back to [facility] but had a shaking episode: sent to [facility]: evaluated and discharged home again. At home she fell again and sent to [facility] for evaluation.  -    (26 Jan 2022 06:42)      PAST MEDICAL & SURGICAL HISTORY  Neuropathy  right lower extremity, vaginal    Type 1 diabetes    Degenerative disc disease, thoracic    Urinary tract infection, recurrent    Gastroesophageal reflux disease, esophagitis presence not specified    Intractable headache, unspecified chronicity pattern, unspecified headache type    Major depressive disorder with single episode, in full remission  previously treated with zyprexa, celexa and lexapro    Tremor of right hand    Tachycardia    Spinal stenosis        FAMILY HISTORY:  FAMILY HISTORY:      SOCIAL HISTORY:  []smoker  []Alcohol  []Drug    ALLERGIES:  Ceclor (Rash)  clindamycin (Hives; Nephrotoxicity)  penicillins (Hives)      MEDICATIONS:  MEDICATIONS  (STANDING):  amitriptyline 75 milliGRAM(s) Oral at bedtime  aspirin  chewable 81 milliGRAM(s) Oral daily  chlorhexidine 4% Liquid 1 Application(s) Topical <User Schedule>  dextrose 40% Gel 15 Gram(s) Oral once  dextrose 5%. 1000 milliLiter(s) (50 mL/Hr) IV Continuous <Continuous>  dextrose 5%. 1000 milliLiter(s) (100 mL/Hr) IV Continuous <Continuous>  dextrose 50% Injectable 25 Gram(s) IV Push once  dextrose 50% Injectable 12.5 Gram(s) IV Push once  dextrose 50% Injectable 25 Gram(s) IV Push once  enoxaparin Injectable 40 milliGRAM(s) SubCutaneous daily  escitalopram 10 milliGRAM(s) Oral daily  glucagon  Injectable 1 milliGRAM(s) IntraMuscular once  insulin glargine Injectable (LANTUS) 25 Unit(s) SubCutaneous every morning  insulin lispro (ADMELOG) corrective regimen sliding scale   SubCutaneous three times a day before meals  insulin lispro Injectable (ADMELOG) 5 Unit(s) SubCutaneous three times a day before meals  metoprolol tartrate 25 milliGRAM(s) Oral every 8 hours  multivitamin 1 Tablet(s) Oral daily    MEDICATIONS  (PRN):  cyclobenzaprine 10 milliGRAM(s) Oral at bedtime PRN Muscle Spasm  hydrOXYzine hydrochloride 50 milliGRAM(s) Oral every 6 hours PRN Anxiety  ondansetron Injectable 4 milliGRAM(s) IV Push every 4 hours PRN Nausea and/or Vomiting      HOME MEDICATIONS:  Home Medications:  amitriptyline 75 mg oral tablet: 1 tab(s) orally once a day (at bedtime) (26 Jan 2022 07:51)  aspirin 81 mg oral tablet, chewable: 1 tab(s) orally once a day (26 Jan 2022 07:51)  cyclobenzaprine 10 mg oral tablet: 1 tab(s) orally once a day (at bedtime) (26 Jan 2022 07:51)  famotidine 40 mg oral tablet: 1 tab(s) orally 2 times a day (26 Jan 2022 07:51)  metoprolol tartrate 25 mg oral tablet: 1 tab(s) orally 2 times a day (26 Jan 2022 07:51)  multivitamin: 1 tab(s) orally once a day (26 Jan 2022 07:51)      VITALS:   T(F): 96.4 (01-27 @ 14:35), Max: 99 (01-26 @ 18:30)  HR: 111 (01-27 @ 14:35) (108 - 130)  BP: 115/63 (01-27 @ 14:35) (108/59 - 147/73)  BP(mean): --  RR: 16 (01-27 @ 14:35) (16 - 20)  SpO2: 100% (01-27 @ 08:59) (99% - 100%)    I&O's Summary      REVIEW OF SYSTEMS:  CONSTITUTIONAL: No weakness, fevers or chills  EYES: occasional blurry vision   NECK: No pain or stiffness  RESPIRATORY: No cough, wheezing, hemoptysis; No shortness of breath  CARDIOVASCULAR: episodic chest pain no palpitations  GASTROINTESTINAL: No abdominal or epigastric pain. No nausea, vomiting, or hematemesis; No diarrhea or constipation.   GENITOURINARY: No Polyuria  NEUROLOGICAL:  No tremors, no Weakness or numbness  SKIN: No itching, no rashes,  MSK: no joint pain    PHYSICAL EXAM:(limited )   GENERAL: Patient is awake , alert and oriented,  not in acute distress getting EEG done   EYES: No proptosis, no lid lag  NECK: No thyroid enlargement  LUNGS: Clear to auscultation bilaterally   CARDIOVASCULAR: S1/S2 present, RRR , no murmurs or rubs  ABD: Soft, tender to deep palpation , non-distended, +BS  EXT: No KASSANDRA  SKIN: No abdominal striae, + hirsutism   NEURO: No tremors, DTR 2+    LABS:                        11.3   11.79 )-----------( 426      ( 26 Jan 2022 02:55 )             36.2     01-26    135  |  101  |  9<L>  ----------------------------<  146<H>  4.3   |  19  |  0.7    Ca    9.7      26 Jan 2022 05:25  Mg     1.8     01-26    TPro  7.9  /  Alb  4.5  /  TBili  0.4  /  DBili  x   /  AST  40  /  ALT  73<H>  /  AlkPhos  132<H>  01-26    PT/INR - ( 26 Jan 2022 02:55 )   PT: 14.20 sec;   INR: 1.24 ratio         PTT - ( 26 Jan 2022 02:55 )  PTT:35.8 sec  Creatine Kinase, Serum: 58 U/L (01-26-22 @ 18:50)  Troponin T, Serum: <0.01 ng/mL (01-26-22 @ 18:50)    CARDIAC MARKERS ( 26 Jan 2022 18:50 )  x     / <0.01 ng/mL / 58 U/L / x     / 1.7 ng/mL  CARDIAC MARKERS ( 26 Jan 2022 12:59 )  x     / <0.01 ng/mL / 58 U/L / x     / 1.9 ng/mL  CARDIAC MARKERS ( 26 Jan 2022 02:55 )  x     / <0.01 ng/mL / 58 U/L / x     / x            POCT Blood Glucose.: 286 mg/dL (01-27-22 @ 12:51)  POCT Blood Glucose.: 277 mg/dL (01-27-22 @ 11:14)  POCT Blood Glucose.: 367 mg/dL (01-27-22 @ 07:46)  POCT Blood Glucose.: 288 mg/dL (01-26-22 @ 20:34)  POCT Blood Glucose.: 232 mg/dL (01-26-22 @ 18:18)  POCT Blood Glucose.: 375 mg/dL (01-26-22 @ 12:20)  POCT Blood Glucose.: 366 mg/dL (01-26-22 @ 10:57)  POCT Blood Glucose.: 340 mg/dL (01-26-22 @ 09:34)  POCT Blood Glucose.: 291 mg/dL (01-26-22 @ 08:38)  POCT Blood Glucose.: 142 mg/dL (01-26-22 @ 06:24)  POCT Blood Glucose.: 254 mg/dL (01-26-22 @ 04:46)  POCT Blood Glucose.: 376 mg/dL (01-26-22 @ 02:08)    TSH 1.89; Total T3 --; Free T4 --

## 2022-01-27 NOTE — PROGRESS NOTE ADULT - ASSESSMENT
Patient is 37 yo female with hx of Degenerative disc disease, thoracic, Gastroesophageal reflux disease, Intractable headache, major depressive disorder with single episode, Neuropathy, spinal stenosis, Tachycardia, Tremor of right hand, Type 1 diabetes   and urinary tract infection, recurrent presenting with     #Syncope.   -Cardiac monitor shows no arrythemia   -Trop negative x3  -D-dimer negative  -CXR shows no acute process  -TTE shows NL EF  -Cardiology recommended outpatient followup  evaluation  -Neurology recommended VEEG  -No seizure activity  -F/U orthostatic BP       #Type 1 diabetes.   -Continue with lantus, scheduled insulin lispro, and ISS  -Monitor POC    # Major depressive disorder with single episode, in full remission.   -Continue with increased dose of Lexapro  -Psychiatry to follow up     #Gastroesophageal reflux disease,   -Continue with PPI          #Progress Note Handoff  Pending (specify):  as above   Family discussion:  plan of care was discussed with patient   in details.  all questions were answered.  seems to understand, and in agreement  Disposition:  unknown

## 2022-01-27 NOTE — EEG REPORT - NS EEG TEXT BOX
KOKI MONTGOMERY   MRN [id number]    Patient is a 36y old  Female who presents with a chief complaint of     INTERVAL/OVERNIGHT EVENTS:      PAST MEDICAL & SURGICAL HISTORY:  Neuropathy  right lower extremity, vaginal    Type 1 diabetes    Degenerative disc disease, thoracic    Urinary tract infection, recurrent    Gastroesophageal reflux disease, esophagitis presence not specified    Intractable headache, unspecified chronicity pattern, unspecified headache type    Major depressive disorder with single episode, in full remission  previously treated with zyprexa, celexa and lexapro    Tremor of right hand    Tachycardia    Spinal stenosis        FAMILY HISTORY:      MEDICATIONS, ALLERGIES, & DIET:  MEDICATIONS  (STANDING):  amitriptyline 75 milliGRAM(s) Oral at bedtime  aspirin  chewable 81 milliGRAM(s) Oral daily  chlorhexidine 4% Liquid 1 Application(s) Topical <User Schedule>  dextrose 40% Gel 15 Gram(s) Oral once  dextrose 5%. 1000 milliLiter(s) (50 mL/Hr) IV Continuous <Continuous>  dextrose 5%. 1000 milliLiter(s) (100 mL/Hr) IV Continuous <Continuous>  dextrose 50% Injectable 25 Gram(s) IV Push once  dextrose 50% Injectable 12.5 Gram(s) IV Push once  dextrose 50% Injectable 25 Gram(s) IV Push once  enoxaparin Injectable 40 milliGRAM(s) SubCutaneous daily  escitalopram 10 milliGRAM(s) Oral daily  glucagon  Injectable 1 milliGRAM(s) IntraMuscular once  insulin glargine Injectable (LANTUS) 10 Unit(s) SubCutaneous every morning  insulin lispro (ADMELOG) corrective regimen sliding scale   SubCutaneous three times a day before meals  insulin lispro Injectable (ADMELOG) 5 Unit(s) SubCutaneous three times a day before meals  metoprolol tartrate 25 milliGRAM(s) Oral every 12 hours  multivitamin 1 Tablet(s) Oral daily    MEDICATIONS  (PRN):  cyclobenzaprine 10 milliGRAM(s) Oral at bedtime PRN Muscle Spasm  hydrOXYzine hydrochloride 50 milliGRAM(s) Oral every 6 hours PRN Anxiety  ondansetron Injectable 4 milliGRAM(s) IV Push every 4 hours PRN Nausea and/or Vomiting    Allergies    Ceclor (Rash)  clindamycin (Hives; Nephrotoxicity)  penicillins (Hives)    Intolerances    Cipro (Other)  gabapentin (Other)  Influenza Virus Vaccine, H5N1, Inactivated (Other)        VITALS, INTAKE/OUTPUT:  Vital Signs Last 24 Hrs  T(C): 36.5 (2022 05:07), Max: 37.2 (2022 14:21)  T(F): 97.7 (2022 05:07), Max: 99 (2022 18:30)  HR: 112 (2022 05:07) (108 - 130)  BP: 125/65 (2022 21:59) (122/57 - 130/82)  BP(mean): --  RR: 16 (2022 08:59) (16 - 18)  SpO2: 100% (2022 08:59) (100% - 100%)    Daily Height in cm: 165.1 (2022 18:30)    Daily   BMI (kg/m2): 26.8 ( @ 18:30)    I&O's Summary        PHYSICAL EXAM:  I examined the patient at approximately_____ during Family Centered rounds with mother/father present at bedside  VS reviewed, stable.  Gen: patient is _________________, smiling, interactive, well appearing, no acute distress  HEENT: NC/AT, pupils equal, responsive, reactive to light and accomodation, no conjunctivitis or scleral icterus; no nasal discharge or congestion. OP without exudates/erythema.   Neck: FROM, supple, no cervical LAD  Chest: CTA b/l, no crackles/wheezes, good air entry, no tachypnea or retractions  CV: regular rate and rhythm, no murmurs   Abd: soft, nontender, nondistended, no HSM appreciated, +BS  : normal external genitalia  Back: no vertebral or paraspinal tenderness along entire spine; no CVAT  Extrem: No joint effusion or tenderness; FROM of all joints; no deformities or erythema noted. 2+ peripheral pulses, WWP.   Neuro: CN II-XII intact--did not test visual acuity. Strength in B/L UEs and LEs 5/5; sensation intact and equal in b/l LEs and b/l UEs. Gait wnl. Patellar DTRs 2+ b/l     INTERVAL LAB RESULTS:                        11.3   11.79 )-----------( 426      ( 2022 02:55 )             36.2                         10.8   8.22  )-----------( 423      ( 2022 09:12 )             34.4         Urinalysis Basic - ( 2022 04:20 )    Color: Yellow / Appearance: Clear / S.025 / pH: x  Gluc: x / Ketone: 160  / Bili: Negative / Urobili: 0.2 mg/dL   Blood: x / Protein: Negative mg/dL / Nitrite: Negative   Leuk Esterase: Negative / RBC: x / WBC x   Sq Epi: x / Non Sq Epi: x / Bacteria: x      UCx           INTERVAL IMAGING STUDIES:    VEEGx24 hrs:       Background---  continues, symmetrical well organized reaching 8-9 hz     Intericatal activity------none        Focal and generalized slowing---------  none    Events-------------  none    Impression:     normal V-EEG X 24 hours    Clinical correlation:----- -none    Plan - as/Neurology team

## 2022-01-28 LAB
ALBUMIN SERPL ELPH-MCNC: 4.3 G/DL — SIGNIFICANT CHANGE UP (ref 3.5–5.2)
ALP SERPL-CCNC: 133 U/L — HIGH (ref 30–115)
ALT FLD-CCNC: 46 U/L — HIGH (ref 0–41)
ANION GAP SERPL CALC-SCNC: 14 MMOL/L — SIGNIFICANT CHANGE UP (ref 7–14)
ANION GAP SERPL CALC-SCNC: 24 MMOL/L — HIGH (ref 7–14)
AST SERPL-CCNC: 16 U/L — SIGNIFICANT CHANGE UP (ref 0–41)
BILIRUB SERPL-MCNC: 0.3 MG/DL — SIGNIFICANT CHANGE UP (ref 0.2–1.2)
BUN SERPL-MCNC: 11 MG/DL — SIGNIFICANT CHANGE UP (ref 10–20)
BUN SERPL-MCNC: 12 MG/DL — SIGNIFICANT CHANGE UP (ref 10–20)
CALCIUM SERPL-MCNC: 9.1 MG/DL — SIGNIFICANT CHANGE UP (ref 8.5–10.1)
CALCIUM SERPL-MCNC: 9.3 MG/DL — SIGNIFICANT CHANGE UP (ref 8.5–10.1)
CHLORIDE SERPL-SCNC: 104 MMOL/L — SIGNIFICANT CHANGE UP (ref 98–110)
CHLORIDE SERPL-SCNC: 97 MMOL/L — LOW (ref 98–110)
CO2 SERPL-SCNC: 10 MMOL/L — LOW (ref 17–32)
CO2 SERPL-SCNC: 16 MMOL/L — LOW (ref 17–32)
CREAT SERPL-MCNC: 0.8 MG/DL — SIGNIFICANT CHANGE UP (ref 0.7–1.5)
CREAT SERPL-MCNC: 0.8 MG/DL — SIGNIFICANT CHANGE UP (ref 0.7–1.5)
GLUCOSE BLDC GLUCOMTR-MCNC: 104 MG/DL — HIGH (ref 70–99)
GLUCOSE BLDC GLUCOMTR-MCNC: 186 MG/DL — HIGH (ref 70–99)
GLUCOSE BLDC GLUCOMTR-MCNC: 193 MG/DL — HIGH (ref 70–99)
GLUCOSE BLDC GLUCOMTR-MCNC: 222 MG/DL — HIGH (ref 70–99)
GLUCOSE BLDC GLUCOMTR-MCNC: 306 MG/DL — HIGH (ref 70–99)
GLUCOSE BLDC GLUCOMTR-MCNC: 307 MG/DL — HIGH (ref 70–99)
GLUCOSE BLDC GLUCOMTR-MCNC: 340 MG/DL — HIGH (ref 70–99)
GLUCOSE BLDC GLUCOMTR-MCNC: 84 MG/DL — SIGNIFICANT CHANGE UP (ref 70–99)
GLUCOSE SERPL-MCNC: 129 MG/DL — HIGH (ref 70–99)
GLUCOSE SERPL-MCNC: 359 MG/DL — HIGH (ref 70–99)
HCT VFR BLD CALC: 35.9 % — LOW (ref 37–47)
HGB BLD-MCNC: 10.9 G/DL — LOW (ref 12–16)
MCHC RBC-ENTMCNC: 26.3 PG — LOW (ref 27–31)
MCHC RBC-ENTMCNC: 30.4 G/DL — LOW (ref 32–37)
MCV RBC AUTO: 86.7 FL — SIGNIFICANT CHANGE UP (ref 81–99)
NRBC # BLD: 0 /100 WBCS — SIGNIFICANT CHANGE UP (ref 0–0)
PLATELET # BLD AUTO: 520 K/UL — HIGH (ref 130–400)
POTASSIUM SERPL-MCNC: 4.5 MMOL/L — SIGNIFICANT CHANGE UP (ref 3.5–5)
POTASSIUM SERPL-MCNC: 5 MMOL/L — SIGNIFICANT CHANGE UP (ref 3.5–5)
POTASSIUM SERPL-SCNC: 4.5 MMOL/L — SIGNIFICANT CHANGE UP (ref 3.5–5)
POTASSIUM SERPL-SCNC: 5 MMOL/L — SIGNIFICANT CHANGE UP (ref 3.5–5)
PROT SERPL-MCNC: 7.7 G/DL — SIGNIFICANT CHANGE UP (ref 6–8)
RBC # BLD: 4.14 M/UL — LOW (ref 4.2–5.4)
RBC # FLD: 15.6 % — HIGH (ref 11.5–14.5)
SODIUM SERPL-SCNC: 131 MMOL/L — LOW (ref 135–146)
SODIUM SERPL-SCNC: 134 MMOL/L — LOW (ref 135–146)
WBC # BLD: 17.4 K/UL — HIGH (ref 4.8–10.8)
WBC # FLD AUTO: 17.4 K/UL — HIGH (ref 4.8–10.8)

## 2022-01-28 PROCEDURE — 95720 EEG PHY/QHP EA INCR W/VEEG: CPT

## 2022-01-28 PROCEDURE — 74177 CT ABD & PELVIS W/CONTRAST: CPT | Mod: 26

## 2022-01-28 PROCEDURE — 99223 1ST HOSP IP/OBS HIGH 75: CPT

## 2022-01-28 PROCEDURE — 99232 SBSQ HOSP IP/OBS MODERATE 35: CPT

## 2022-01-28 PROCEDURE — 99231 SBSQ HOSP IP/OBS SF/LOW 25: CPT

## 2022-01-28 RX ORDER — INSULIN GLARGINE 100 [IU]/ML
10 INJECTION, SOLUTION SUBCUTANEOUS ONCE
Refills: 0 | Status: COMPLETED | OUTPATIENT
Start: 2022-01-28 | End: 2022-01-28

## 2022-01-28 RX ORDER — SENNA PLUS 8.6 MG/1
2 TABLET ORAL AT BEDTIME
Refills: 0 | Status: DISCONTINUED | OUTPATIENT
Start: 2022-01-28 | End: 2022-01-31

## 2022-01-28 RX ORDER — POLYETHYLENE GLYCOL 3350 17 G/17G
17 POWDER, FOR SOLUTION ORAL DAILY
Refills: 0 | Status: DISCONTINUED | OUTPATIENT
Start: 2022-01-28 | End: 2022-01-31

## 2022-01-28 RX ORDER — SODIUM CHLORIDE 9 MG/ML
1000 INJECTION, SOLUTION INTRAVENOUS
Refills: 0 | Status: DISCONTINUED | OUTPATIENT
Start: 2022-01-28 | End: 2022-01-28

## 2022-01-28 RX ORDER — INSULIN GLARGINE 100 [IU]/ML
40 INJECTION, SOLUTION SUBCUTANEOUS EVERY MORNING
Refills: 0 | Status: DISCONTINUED | OUTPATIENT
Start: 2022-01-29 | End: 2022-01-29

## 2022-01-28 RX ORDER — INSULIN HUMAN 100 [IU]/ML
5 INJECTION, SOLUTION SUBCUTANEOUS ONCE
Refills: 0 | Status: COMPLETED | OUTPATIENT
Start: 2022-01-28 | End: 2022-01-28

## 2022-01-28 RX ORDER — SODIUM CHLORIDE 9 MG/ML
1000 INJECTION INTRAMUSCULAR; INTRAVENOUS; SUBCUTANEOUS
Refills: 0 | Status: DISCONTINUED | OUTPATIENT
Start: 2022-01-28 | End: 2022-01-29

## 2022-01-28 RX ADMIN — INSULIN GLARGINE 10 UNIT(S): 100 INJECTION, SOLUTION SUBCUTANEOUS at 11:55

## 2022-01-28 RX ADMIN — Medication 25 MILLIGRAM(S): at 06:08

## 2022-01-28 RX ADMIN — INSULIN HUMAN 5 UNIT(S): 100 INJECTION, SOLUTION SUBCUTANEOUS at 11:54

## 2022-01-28 RX ADMIN — Medication 4: at 12:05

## 2022-01-28 RX ADMIN — CHLORHEXIDINE GLUCONATE 1 APPLICATION(S): 213 SOLUTION TOPICAL at 05:29

## 2022-01-28 RX ADMIN — Medication 4: at 08:03

## 2022-01-28 RX ADMIN — ESCITALOPRAM OXALATE 10 MILLIGRAM(S): 10 TABLET, FILM COATED ORAL at 17:03

## 2022-01-28 RX ADMIN — PANTOPRAZOLE SODIUM 40 MILLIGRAM(S): 20 TABLET, DELAYED RELEASE ORAL at 06:08

## 2022-01-28 RX ADMIN — Medication 1 TABLET(S): at 12:06

## 2022-01-28 RX ADMIN — SODIUM CHLORIDE 100 MILLILITER(S): 9 INJECTION INTRAMUSCULAR; INTRAVENOUS; SUBCUTANEOUS at 20:47

## 2022-01-28 RX ADMIN — Medication 75 MILLIGRAM(S): at 22:04

## 2022-01-28 RX ADMIN — Medication 5 UNIT(S): at 12:05

## 2022-01-28 RX ADMIN — SENNA PLUS 2 TABLET(S): 8.6 TABLET ORAL at 22:04

## 2022-01-28 RX ADMIN — Medication 5 UNIT(S): at 08:04

## 2022-01-28 RX ADMIN — INSULIN GLARGINE 25 UNIT(S): 100 INJECTION, SOLUTION SUBCUTANEOUS at 08:06

## 2022-01-28 RX ADMIN — Medication 25 MILLIGRAM(S): at 12:19

## 2022-01-28 RX ADMIN — Medication 81 MILLIGRAM(S): at 12:06

## 2022-01-28 RX ADMIN — ENOXAPARIN SODIUM 40 MILLIGRAM(S): 100 INJECTION SUBCUTANEOUS at 12:08

## 2022-01-28 RX ADMIN — Medication 25 MILLIGRAM(S): at 22:03

## 2022-01-28 NOTE — EEG REPORT - NS EEG TEXT BOX
Epilepsy Attending Note:     CRISTI MONTGOMERY    36y Female  MRN [id number]    Vital Signs Last 24 Hrs  T(C): 36.4 (28 Jan 2022 05:00), Max: 37.1 (27 Jan 2022 20:35)  T(F): 97.5 (28 Jan 2022 05:00), Max: 98.7 (27 Jan 2022 20:35)  HR: 105 (28 Jan 2022 05:00) (98 - 113)  BP: 107/56 (28 Jan 2022 05:00) (107/56 - 122/60)  BP(mean): --  RR: 18 (28 Jan 2022 05:00) (16 - 18)  SpO2: 100% (27 Jan 2022 20:35) (100% - 100%)                MEDICATIONS  (STANDING):  amitriptyline 75 milliGRAM(s) Oral at bedtime  aspirin  chewable 81 milliGRAM(s) Oral daily  chlorhexidine 4% Liquid 1 Application(s) Topical <User Schedule>  dextrose 40% Gel 15 Gram(s) Oral once  dextrose 5%. 1000 milliLiter(s) (50 mL/Hr) IV Continuous <Continuous>  dextrose 5%. 1000 milliLiter(s) (100 mL/Hr) IV Continuous <Continuous>  dextrose 50% Injectable 25 Gram(s) IV Push once  dextrose 50% Injectable 12.5 Gram(s) IV Push once  dextrose 50% Injectable 25 Gram(s) IV Push once  enoxaparin Injectable 40 milliGRAM(s) SubCutaneous daily  escitalopram 10 milliGRAM(s) Oral daily  glucagon  Injectable 1 milliGRAM(s) IntraMuscular once  insulin glargine Injectable (LANTUS) 25 Unit(s) SubCutaneous every morning  insulin lispro (ADMELOG) corrective regimen sliding scale   SubCutaneous three times a day before meals  insulin lispro Injectable (ADMELOG) 5 Unit(s) SubCutaneous three times a day before meals  metoprolol tartrate 25 milliGRAM(s) Oral every 8 hours  multivitamin 1 Tablet(s) Oral daily  pantoprazole    Tablet 40 milliGRAM(s) Oral before breakfast  sodium chloride 0.9%. 1000 milliLiter(s) (100 mL/Hr) IV Continuous <Continuous>    MEDICATIONS  (PRN):  cyclobenzaprine 10 milliGRAM(s) Oral at bedtime PRN Muscle Spasm  hydrOXYzine hydrochloride 50 milliGRAM(s) Oral every 6 hours PRN Anxiety  ondansetron Injectable 4 milliGRAM(s) IV Push every 4 hours PRN Nausea and/or Vomiting            VEEG in the last 24 hours:    Background--------------------continues, well organized reaching frequencies in the range of 8-9 hz    Focal and generalized slowing-------------   none    Interictal activity-----none    Events---------one event  reported and captured on the video yesterday about 10 am.                       It was seen as decreased responsiveness and eyelid blinking. The event was not associated with epileptiform EEG changes from the baseline    Seizures-----------  none    Impression:    normal A/D/S V-EEG and none epileptic event    Plan - as/ the primary teams. would not recommend AED

## 2022-01-28 NOTE — CONSULT NOTE ADULT - ASSESSMENT
36yFemale pmh UTI, DM, admitted with syncope. GI consulted for abdominal pain.       Problem 1-Suprapubic tenderness  Rec  -CT scan abdomen and pelvis with IV contrast  -IVF  -CBC,CMP,INR    Problem 2-Previously noted regions of focal hypoattenuation measuring approximately 1.2 cm and 2.8 cm are redemonstrated without short interval change. Findings suspected to be on the basis of focal fat deposition, however evaluation with MRI can be obtained as previously recommended.  Rec  - Follow up with our GI office located on [address] [place] [id number], Phone number [phone] with Dr. Munoz 36yFemale pmh UTI, DM, admitted with syncope. GI consulted for abdominal pain.       Problem 1-Suprapubic tenderness  Rec  -CT scan abdomen and pelvis with IV contrast  -IVF  -CBC,CMP,INR  will follow up    Problem 2-Previously noted regions of focal hypoattenuation measuring approximately 1.2 cm and 2.8 cm are redemonstrated without short interval change. Findings suspected to be on the basis of focal fat deposition, however evaluation with MRI can be obtained as previously recommended.  Rec  - Follow up with our GI office located on [address] [place] [address], Phone number [phone] with Dr. Munoz

## 2022-01-28 NOTE — CONSULT NOTE ADULT - CONSULT REASON
Recurrent LOC, r/o seizure
Recurrent syncopal episodes
abdominal pain
depression
type 1 DM on insulin pump

## 2022-01-28 NOTE — CONSULT NOTE ADULT - SUBJECTIVE AND OBJECTIVE BOX
Chief complaint/Reason for consult: abdominal pain     HPI:  Hospitalist Note:   37yo female whose PMH includes type 1 diabetes on insulin pump and tachycardia, presents to ER after recurrent episodes of syncope (or near syncope). She reports that yesterday she had episode of body shaking. Saw her cardiologist who recommended she go to [facility] for psych evaluation. Tonight, while at home, she went into her parent's room where she apparently collapsed . At this point I left to obtain copy of medications from EMR to review but when I returned she was on the phone with her father hysterical crying and not stopping to talk to me    Medical PA Note:  Patient is a resident at [facility]: 7:30 AM yesterday fainted in her bathroom after a shower. Fainting spells recently since Christmas. Was admitted to [facility] about 1 1/2 weeks ago for evaluation. She was placed on a MCOT by the Cardiologist: Dr Hopper.   - she was taken to [facility] yesterday after the the above fainting spell: EKG, Labwork, CXR done. Seen by Neurologist: Dr Dash who recommends 24-48 hr EEG to r/o Seizure activity.  She was D/C back to [facility] but had a shaking episode: sent to [facility]: evaluated and discharged home again. At home she fell again and sent to [facility] for evaluation.  -    (26 Jan 2022 06:42)    GI updates: 36yFemale pmh UTI, DM, admitted with syncope. GI consulted for abdominal pain. Patient denies nausea, vomiting, hematemesis, melena, blood in stool, diarrhea, constipation. Patient reports suprapubic tenderness.      PAST MEDICAL & SURGICAL HISTORY:  Neuropathy  right lower extremity, vaginal    Type 1 diabetes    Degenerative disc disease, thoracic    Urinary tract infection, recurrent    Gastroesophageal reflux disease, esophagitis presence not specified    Intractable headache, unspecified chronicity pattern, unspecified headache type    Major depressive disorder with single episode, in full remission  previously treated with zyprexa, celexa and lexapro    Tremor of right hand    Tachycardia    Spinal stenosis          Family history:  FAMILY HISTORY:    No GI cancers in first or second degree relatives    Social History: No smoking. No alcohol. No illegal drug use.    Allergies:   Ceclor (Rash)  clindamycin (Hives; Nephrotoxicity)  penicillins (Hives)  Intolerances  Cipro (Other)  gabapentin (Other)  Influenza Virus Vaccine, H5N1, Inactivated (Other)          MEDICATIONS: Home Medications:  amitriptyline 75 mg oral tablet: 1 tab(s) orally once a day (at bedtime) (26 Jan 2022 07:51)  aspirin 81 mg oral tablet, chewable: 1 tab(s) orally once a day (26 Jan 2022 07:51)  cyclobenzaprine 10 mg oral tablet: 1 tab(s) orally once a day (at bedtime) (26 Jan 2022 07:51)  famotidine 40 mg oral tablet: 1 tab(s) orally 2 times a day (26 Jan 2022 07:51)  metoprolol tartrate 25 mg oral tablet: 1 tab(s) orally 2 times a day (26 Jan 2022 07:51)  multivitamin: 1 tab(s) orally once a day (26 Jan 2022 07:51)    MEDICATIONS  (STANDING):  amitriptyline 75 milliGRAM(s) Oral at bedtime  aspirin  chewable 81 milliGRAM(s) Oral daily  chlorhexidine 4% Liquid 1 Application(s) Topical <User Schedule>  dextrose 40% Gel 15 Gram(s) Oral once  dextrose 5%. 1000 milliLiter(s) (50 mL/Hr) IV Continuous <Continuous>  dextrose 5%. 1000 milliLiter(s) (100 mL/Hr) IV Continuous <Continuous>  dextrose 50% Injectable 25 Gram(s) IV Push once  dextrose 50% Injectable 12.5 Gram(s) IV Push once  dextrose 50% Injectable 25 Gram(s) IV Push once  enoxaparin Injectable 40 milliGRAM(s) SubCutaneous daily  escitalopram 10 milliGRAM(s) Oral daily  glucagon  Injectable 1 milliGRAM(s) IntraMuscular once  insulin lispro (ADMELOG) corrective regimen sliding scale   SubCutaneous three times a day before meals  insulin lispro Injectable (ADMELOG) 5 Unit(s) SubCutaneous three times a day before meals  metoprolol tartrate 25 milliGRAM(s) Oral every 8 hours  multivitamin 1 Tablet(s) Oral daily  pantoprazole    Tablet 40 milliGRAM(s) Oral before breakfast  polyethylene glycol 3350 17 Gram(s) Oral daily  senna 2 Tablet(s) Oral at bedtime  sodium chloride 0.9%. 1000 milliLiter(s) (100 mL/Hr) IV Continuous <Continuous>    MEDICATIONS  (PRN):  cyclobenzaprine 10 milliGRAM(s) Oral at bedtime PRN Muscle Spasm  hydrOXYzine hydrochloride 50 milliGRAM(s) Oral every 6 hours PRN Anxiety  ondansetron Injectable 4 milliGRAM(s) IV Push every 4 hours PRN Nausea and/or Vomiting        REVIEW OF SYSTEMS  General:  No weight loss, fevers, or chills.  Eyes:  No reported pain or visual changes  ENT:  No sore throat or runny nose.  NECK: No stiffness or lymphadenopathy  CV:  No chest pain or palpitations.  Resp:  No shortness of breath, cough, wheezing or hemoptysis  GI:  +suprapubic tenderness, No nausea, vomiting, dysphagia, diarrhea or constipation. No rectal bleeding, melena, or hematemesis.  Muscle:  No aches or weakness  Neuro:  No tingling, numbness       VITALS:   T(F): 97.8 (01-28-22 @ 14:39), Max: 98.7 (01-27-22 @ 20:35)  HR: 92 (01-28-22 @ 14:39) (92 - 105)  BP: 126/63 (01-28-22 @ 14:39) (107/56 - 126/63)  RR: 18 (01-28-22 @ 14:39) (18 - 18)  SpO2: 100% (01-27-22 @ 20:35) (100% - 100%)    PHYSICAL EXAM:  GENERAL: AAOx3, no acute distress.  HEAD:  Atraumatic, Normocephalic  EYES: conjunctiva and sclera clear  NECK: Supple, No thyromegaly   CHEST/LUNG: Clear to auscultation bilaterally; No wheeze, rhonchi, or rales  HEART: Regular rate and rhythm; normal S1, S2, No murmurs.  ABDOMEN: Soft, nontender, nondistended; Bowel sounds present  NEUROLOGY: No asterixis or tremor  SKIN: Intact, no jaundice          LABS:  01-28    131<L>  |  97<L>  |  12  ----------------------------<  359<H>  5.0   |  10<L>  |  0.8    Ca    9.1      28 Jan 2022 07:11    TPro  7.7  /  Alb  4.3  /  TBili  0.3  /  DBili  x   /  AST  16  /  ALT  46<H>  /  AlkPhos  133<H>  01-28                          10.9   17.40 )-----------( 520      ( 28 Jan 2022 07:11 )             35.9     LIVER FUNCTIONS - ( 28 Jan 2022 07:11 )  Alb: 4.3 g/dL / Pro: 7.7 g/dL / ALK PHOS: 133 U/L / ALT: 46 U/L / AST: 16 U/L / GGT: x               IMAGING:      < from: CT Abdomen and Pelvis w/ IV Cont (12.02.21 @ 12:13) >    EXAM:  CT ABDOMEN AND PELVIS IC            PROCEDURE DATE:  12/02/2021            INTERPRETATION:  CLINICAL STATEMENT: Intractable lower abdominal pain    TECHNIQUE: Contiguous CT images were obtained of the abdomen and pelvis.  Intravenous Contrast:  Intravenous contrast administered.  Oral contrast: was not administered.      COMPARISON:  CT abdomen pelvis dated 11/29/2021    FINDINGS:    LOWER CHEST: Unremarkable.    LIVER: Previously noted regions of focal hypoattenuation measuring approximately 1.2 cm and 2.8 cm are redemonstrated without short interval change. Findings suspected to be on the basis of focal fat deposition, however evaluation with MRI can be obtained as previously recommended.    SPLEEN: Unremarkable.    PANCREAS: Unremarkable.    GALLBLADDER AND BILIARY TREE: Unremarkable CT appearance of the gallbladder. No biliary ductal dilatation.    ADRENALS: Unremarkable.    KIDNEYS: Symmetric pattern of renal enhancement. No hydronephrosis.    LYMPH NODES: There are no enlarged abdominal or pelvic lymph nodes.    VASCULATURE: The abdominal aorta is normal in caliber.    BOWEL: No evidence for bowel obstruction or bowel wall thickening. Unremarkable appendix.    PERITONEUM/RETROPERITONEUM/MESENTERY: There is no ascites orpneumoperitoneum.    PELVIC VISCERA: Unremarkable as visualized.    BONES AND SOFT TISSUES: No acute osseous abnormality is noted.      IMPRESSION:    No acute intra-abdominal or pelvic pathology is identified.    --- End of Report ---              CLAUDIA ANNE MD; Attending Radiologist  This document has been electronically signed. Dec  2 2021 12:34PM    < end of copied text >

## 2022-01-28 NOTE — PROGRESS NOTE ADULT - ASSESSMENT
- increase Lantus to 40 units ( dose patient was on when off pump ) daily in am starting today   - increase Admelog to 10 units TIDAC   - continue sliding scale  - BMP pending

## 2022-01-28 NOTE — PROGRESS NOTE ADULT - ASSESSMENT
Patient is 35 yo female with hx of Degenerative disc disease, thoracic, Gastroesophageal reflux disease, Intractable headache, major depressive disorder with single episode, Neuropathy, spinal stenosis, Tachycardia, Tremor of right hand, Type 1 diabetes   and urinary tract infection, recurrent presenting with     #Syncope.   -Cardiac monitor shows no arrythemia   -Trop negative x3  -D-dimer negative  -CXR shows no acute process  -TTE shows NL EF  -Cardiology recommended outpatient followup  evaluation  -Neurology recommended VEEG, which showed no seizure activity, outpatient follow up with neurology  -No seizure activity  -F/U orthostatic BP       #Type 1 diabetes.   -Elevated POC with increased anion gap  -Improved with hydrations, and increased dose of insulin  -Continue with increased dose of lantus, scheduled insulin lispro, and ISS  -Monitor POC    # Major depressive disorder with single episode, in full remission.   -Continue with increased dose of Lexapro  -Psychiatry to follow up     #Abdm pain  -Seems to be secondary to GERD  -Continue with PPI   -Abdm CT scan shows no acute process  -LFT ok       #Progress Note Handoff  Pending (specify):  anticipate for am  Family discussion:  plan of care was discussed with patient   in details.  all questions were answered.  seems to understand, and in agreement  Disposition:  unknown

## 2022-01-28 NOTE — CONSULT NOTE ADULT - ATTENDING COMMENTS
Patient with reported syncope. No clear cardiac etiology. Neuro to see, Check thyroid and d- dimer,  F/u with Dr Christian outpatient
I edited the note
I have personally seen and examined this patient.  I have fully participated in the care of this patient.  I have reviewed all pertinent clinical information, including history, physical exam, plan and note.   I have reviewed all pertinent clinical information and reviewed all relevant imaging and diagnostic studies personally.  Pt w/ episodes of LOC w/ ? arrhythmia on heart monitor currently here for recurrent events on VEEG at Dr. Dumont's request.  Recommendations as above.  Agree with above assessment except as noted.

## 2022-01-29 LAB
ALBUMIN SERPL ELPH-MCNC: 3.5 G/DL — SIGNIFICANT CHANGE UP (ref 3.5–5.2)
ALP SERPL-CCNC: 103 U/L — SIGNIFICANT CHANGE UP (ref 30–115)
ALT FLD-CCNC: 32 U/L — SIGNIFICANT CHANGE UP (ref 0–41)
ANION GAP SERPL CALC-SCNC: 13 MMOL/L — SIGNIFICANT CHANGE UP (ref 7–14)
AST SERPL-CCNC: 19 U/L — SIGNIFICANT CHANGE UP (ref 0–41)
BILIRUB SERPL-MCNC: 1.1 MG/DL — SIGNIFICANT CHANGE UP (ref 0.2–1.2)
BUN SERPL-MCNC: 8 MG/DL — LOW (ref 10–20)
CALCIUM SERPL-MCNC: 8.6 MG/DL — SIGNIFICANT CHANGE UP (ref 8.5–10.1)
CHLORIDE SERPL-SCNC: 103 MMOL/L — SIGNIFICANT CHANGE UP (ref 98–110)
CO2 SERPL-SCNC: 20 MMOL/L — SIGNIFICANT CHANGE UP (ref 17–32)
CREAT SERPL-MCNC: 0.5 MG/DL — LOW (ref 0.7–1.5)
GLUCOSE BLDC GLUCOMTR-MCNC: 127 MG/DL — HIGH (ref 70–99)
GLUCOSE BLDC GLUCOMTR-MCNC: 135 MG/DL — HIGH (ref 70–99)
GLUCOSE BLDC GLUCOMTR-MCNC: 156 MG/DL — HIGH (ref 70–99)
GLUCOSE BLDC GLUCOMTR-MCNC: 157 MG/DL — HIGH (ref 70–99)
GLUCOSE BLDC GLUCOMTR-MCNC: 258 MG/DL — HIGH (ref 70–99)
GLUCOSE BLDC GLUCOMTR-MCNC: 57 MG/DL — LOW (ref 70–99)
GLUCOSE BLDC GLUCOMTR-MCNC: 79 MG/DL — SIGNIFICANT CHANGE UP (ref 70–99)
GLUCOSE SERPL-MCNC: 162 MG/DL — HIGH (ref 70–99)
HCT VFR BLD CALC: 30.7 % — LOW (ref 37–47)
HGB BLD-MCNC: 9.8 G/DL — LOW (ref 12–16)
MCHC RBC-ENTMCNC: 26.6 PG — LOW (ref 27–31)
MCHC RBC-ENTMCNC: 31.9 G/DL — LOW (ref 32–37)
MCV RBC AUTO: 83.2 FL — SIGNIFICANT CHANGE UP (ref 81–99)
NRBC # BLD: 0 /100 WBCS — SIGNIFICANT CHANGE UP (ref 0–0)
PLATELET # BLD AUTO: 428 K/UL — HIGH (ref 130–400)
POTASSIUM SERPL-MCNC: 4 MMOL/L — SIGNIFICANT CHANGE UP (ref 3.5–5)
POTASSIUM SERPL-SCNC: 4 MMOL/L — SIGNIFICANT CHANGE UP (ref 3.5–5)
PROT SERPL-MCNC: 6.3 G/DL — SIGNIFICANT CHANGE UP (ref 6–8)
RBC # BLD: 3.69 M/UL — LOW (ref 4.2–5.4)
RBC # FLD: 15.4 % — HIGH (ref 11.5–14.5)
SODIUM SERPL-SCNC: 136 MMOL/L — SIGNIFICANT CHANGE UP (ref 135–146)
WBC # BLD: 9.44 K/UL — SIGNIFICANT CHANGE UP (ref 4.8–10.8)
WBC # FLD AUTO: 9.44 K/UL — SIGNIFICANT CHANGE UP (ref 4.8–10.8)

## 2022-01-29 PROCEDURE — 99231 SBSQ HOSP IP/OBS SF/LOW 25: CPT

## 2022-01-29 PROCEDURE — 99232 SBSQ HOSP IP/OBS MODERATE 35: CPT

## 2022-01-29 RX ORDER — INSULIN GLARGINE 100 [IU]/ML
15 INJECTION, SOLUTION SUBCUTANEOUS EVERY MORNING
Refills: 0 | Status: DISCONTINUED | OUTPATIENT
Start: 2022-01-30 | End: 2022-01-30

## 2022-01-29 RX ORDER — INSULIN GLARGINE 100 [IU]/ML
30 INJECTION, SOLUTION SUBCUTANEOUS EVERY MORNING
Refills: 0 | Status: DISCONTINUED | OUTPATIENT
Start: 2022-01-29 | End: 2022-01-29

## 2022-01-29 RX ADMIN — Medication 25 MILLIGRAM(S): at 13:22

## 2022-01-29 RX ADMIN — SODIUM CHLORIDE 100 MILLILITER(S): 9 INJECTION INTRAMUSCULAR; INTRAVENOUS; SUBCUTANEOUS at 05:44

## 2022-01-29 RX ADMIN — Medication 75 MILLIGRAM(S): at 22:47

## 2022-01-29 RX ADMIN — INSULIN GLARGINE 30 UNIT(S): 100 INJECTION, SOLUTION SUBCUTANEOUS at 10:53

## 2022-01-29 RX ADMIN — PANTOPRAZOLE SODIUM 40 MILLIGRAM(S): 20 TABLET, DELAYED RELEASE ORAL at 05:43

## 2022-01-29 RX ADMIN — Medication 30 MILLILITER(S): at 13:22

## 2022-01-29 RX ADMIN — Medication 81 MILLIGRAM(S): at 13:22

## 2022-01-29 RX ADMIN — ESCITALOPRAM OXALATE 10 MILLIGRAM(S): 10 TABLET, FILM COATED ORAL at 13:22

## 2022-01-29 RX ADMIN — CHLORHEXIDINE GLUCONATE 1 APPLICATION(S): 213 SOLUTION TOPICAL at 04:16

## 2022-01-29 RX ADMIN — Medication 1 TABLET(S): at 13:22

## 2022-01-29 RX ADMIN — Medication 25 MILLIGRAM(S): at 22:47

## 2022-01-29 RX ADMIN — Medication 25 MILLIGRAM(S): at 05:43

## 2022-01-29 RX ADMIN — POLYETHYLENE GLYCOL 3350 17 GRAM(S): 17 POWDER, FOR SOLUTION ORAL at 13:22

## 2022-01-29 RX ADMIN — ENOXAPARIN SODIUM 40 MILLIGRAM(S): 100 INJECTION SUBCUTANEOUS at 13:23

## 2022-01-29 RX ADMIN — Medication 1: at 13:27

## 2022-01-29 NOTE — PROGRESS NOTE ADULT - ASSESSMENT
Patient is 37 yo female with hx of Degenerative disc disease, thoracic, Gastroesophageal reflux disease, Intractable headache, major depressive disorder with single episode, Neuropathy, spinal stenosis, Tachycardia, Tremor of right hand, Type 1 diabetes   and urinary tract infection, recurrent presenting with     #Syncope.   -Cardiac monitor shows no arrythemia   -Trop negative x3  -D-dimer negative  -CXR shows no acute process  -TTE shows NL EF  -Cardiology recommended outpatient followup  evaluation  -Neurology recommended VEEG, which showed no seizure activity, outpatient follow up with neurology  -No seizure activity  -F/U orthostatic BP       #Type 1 diabetes.   -Anion gap resolved  -Continue with current dose of lantus, and ISS  -Monitor POC  -Discussed with endocrinology    # Major depressive disorder with single episode, in full remission.   -Continue with increased dose of Lexapro  -Psychiatry to follow up     #Abdm pain  -Seems to be secondary to GERD  -Continue with PPI   -Abdm CT scan shows no acute process  -LFT ok       #Progress Note Handoff  Pending (specify):  anticipate for discharge on monday  Family discussion:  plan of care was discussed with patient   in details.  all questions were answered.  seems to understand, and in agreement  Disposition:  unknown

## 2022-01-29 NOTE — PROGRESS NOTE ADULT - ASSESSMENT
#type 1 DM on insulin pump at home   - Fs reviewed noted low after getting admelog correction   - for tomorrow decrease Lantus ro 32 units daily in am ( please do not hold lantus as patient has type 1 DM and can go into DKA )   - decrease Ademlog to 3 units TIDAC , hold if NPO   - change sliding scale to 1:50 >200    - upon discharge she can resume her insulin pump , she will follow in office ( has Laurence 2/1 with me ) to get CGM

## 2022-01-30 LAB
GLUCOSE BLDC GLUCOMTR-MCNC: 106 MG/DL — HIGH (ref 70–99)
GLUCOSE BLDC GLUCOMTR-MCNC: 175 MG/DL — HIGH (ref 70–99)
GLUCOSE BLDC GLUCOMTR-MCNC: 237 MG/DL — HIGH (ref 70–99)
GLUCOSE BLDC GLUCOMTR-MCNC: 278 MG/DL — HIGH (ref 70–99)
GLUCOSE BLDC GLUCOMTR-MCNC: 303 MG/DL — HIGH (ref 70–99)
GLUCOSE BLDC GLUCOMTR-MCNC: 313 MG/DL — HIGH (ref 70–99)

## 2022-01-30 PROCEDURE — 99232 SBSQ HOSP IP/OBS MODERATE 35: CPT

## 2022-01-30 RX ORDER — INSULIN LISPRO 100/ML
8 VIAL (ML) SUBCUTANEOUS ONCE
Refills: 0 | Status: COMPLETED | OUTPATIENT
Start: 2022-01-30 | End: 2022-01-30

## 2022-01-30 RX ORDER — INSULIN LISPRO 100/ML
3 VIAL (ML) SUBCUTANEOUS
Refills: 0 | Status: DISCONTINUED | OUTPATIENT
Start: 2022-01-30 | End: 2022-01-31

## 2022-01-30 RX ORDER — SUCRALFATE 1 G
1 TABLET ORAL
Refills: 0 | Status: DISCONTINUED | OUTPATIENT
Start: 2022-01-30 | End: 2022-01-31

## 2022-01-30 RX ORDER — INSULIN LISPRO 100/ML
VIAL (ML) SUBCUTANEOUS
Refills: 0 | Status: DISCONTINUED | OUTPATIENT
Start: 2022-01-30 | End: 2022-01-31

## 2022-01-30 RX ORDER — INSULIN GLARGINE 100 [IU]/ML
25 INJECTION, SOLUTION SUBCUTANEOUS EVERY MORNING
Refills: 0 | Status: DISCONTINUED | OUTPATIENT
Start: 2022-01-30 | End: 2022-01-31

## 2022-01-30 RX ADMIN — PANTOPRAZOLE SODIUM 40 MILLIGRAM(S): 20 TABLET, DELAYED RELEASE ORAL at 05:35

## 2022-01-30 RX ADMIN — Medication 2: at 16:05

## 2022-01-30 RX ADMIN — POLYETHYLENE GLYCOL 3350 17 GRAM(S): 17 POWDER, FOR SOLUTION ORAL at 13:01

## 2022-01-30 RX ADMIN — INSULIN GLARGINE 25 UNIT(S): 100 INJECTION, SOLUTION SUBCUTANEOUS at 08:12

## 2022-01-30 RX ADMIN — ESCITALOPRAM OXALATE 10 MILLIGRAM(S): 10 TABLET, FILM COATED ORAL at 13:00

## 2022-01-30 RX ADMIN — Medication 81 MILLIGRAM(S): at 13:00

## 2022-01-30 RX ADMIN — Medication 75 MILLIGRAM(S): at 23:10

## 2022-01-30 RX ADMIN — CHLORHEXIDINE GLUCONATE 1 APPLICATION(S): 213 SOLUTION TOPICAL at 05:38

## 2022-01-30 RX ADMIN — Medication 1 TABLET(S): at 13:00

## 2022-01-30 RX ADMIN — ENOXAPARIN SODIUM 40 MILLIGRAM(S): 100 INJECTION SUBCUTANEOUS at 13:00

## 2022-01-30 RX ADMIN — Medication 1 GRAM(S): at 18:00

## 2022-01-30 RX ADMIN — Medication 25 MILLIGRAM(S): at 13:35

## 2022-01-30 RX ADMIN — Medication 8 UNIT(S): at 23:18

## 2022-01-30 RX ADMIN — Medication 25 MILLIGRAM(S): at 23:10

## 2022-01-30 RX ADMIN — Medication 25 MILLIGRAM(S): at 05:35

## 2022-01-30 NOTE — PROGRESS NOTE ADULT - ASSESSMENT
Patient is 37 yo female with hx of Degenerative disc disease, thoracic, Gastroesophageal reflux disease, Intractable headache, major depressive disorder with single episode, Neuropathy, spinal stenosis, Tachycardia, Tremor of right hand, Type 1 diabetes   and urinary tract infection, recurrent presenting with     #Syncope.   -Cardiac monitor shows no arrythemia   -Trop negative x3  -D-dimer negative  -CXR shows no acute process  -TTE shows NL EF.  Negative stress one month ago  -Cardiology recommended outpatient followup    -Neurology recommended VEEG, which showed no seizure activity, outpatient follow up with neurology  -No seizure activity  - orthostatic BP negative      #Type 1 diabetes.   -Anion gap resolved  -Continue with current dose of lantus, and ISS  -Monitor POC  -Discussed with endocrinology    # Major depressive disorder with single episode, in full remission.   -Continue with increased dose of Lexapro  -Psychiatry to follow up     #Abdm pain  -Seems to be secondary to GERD  -Continue with PPI   -Abdm CT scan shows no acute process  -LFT ok       #Progress Note Handoff  Pending (specify):  anticipate for discharge on monday  Family discussion:  plan of care was discussed with patient   in details.  all questions were answered.  seems to understand, and in agreement  Disposition:  unknown

## 2022-01-30 NOTE — CHART NOTE - NSCHARTNOTEFT_GEN_A_CORE
Patient is anticipated for dc tomorrow.  Patient seen at bedside resting comfortably.  Routine Covid swab obtained via nare, atraumatic no complications  Patient without questions of concerns at this time.  Swab sent to lab.

## 2022-01-31 ENCOUNTER — TRANSCRIPTION ENCOUNTER (OUTPATIENT)
Age: 37
End: 2022-01-31

## 2022-01-31 VITALS
TEMPERATURE: 98 F | RESPIRATION RATE: 16 BRPM | HEART RATE: 111 BPM | DIASTOLIC BLOOD PRESSURE: 61 MMHG | SYSTOLIC BLOOD PRESSURE: 135 MMHG

## 2022-01-31 LAB
GLUCOSE BLDC GLUCOMTR-MCNC: 153 MG/DL — HIGH (ref 70–99)
GLUCOSE BLDC GLUCOMTR-MCNC: 230 MG/DL — HIGH (ref 70–99)
GLUCOSE BLDC GLUCOMTR-MCNC: 263 MG/DL — HIGH (ref 70–99)
GLUCOSE BLDC GLUCOMTR-MCNC: 71 MG/DL — SIGNIFICANT CHANGE UP (ref 70–99)
GLUCOSE BLDC GLUCOMTR-MCNC: 95 MG/DL — SIGNIFICANT CHANGE UP (ref 70–99)
SARS-COV-2 RNA SPEC QL NAA+PROBE: SIGNIFICANT CHANGE UP

## 2022-01-31 PROCEDURE — 99233 SBSQ HOSP IP/OBS HIGH 50: CPT

## 2022-01-31 PROCEDURE — 99239 HOSP IP/OBS DSCHRG MGMT >30: CPT

## 2022-01-31 RX ORDER — POLYETHYLENE GLYCOL 3350 17 G/17G
17 POWDER, FOR SOLUTION ORAL
Qty: 510 | Refills: 0
Start: 2022-01-31 | End: 2022-03-01

## 2022-01-31 RX ORDER — PANTOPRAZOLE SODIUM 20 MG/1
1 TABLET, DELAYED RELEASE ORAL
Qty: 30 | Refills: 0
Start: 2022-01-31 | End: 2022-03-01

## 2022-01-31 RX ORDER — INSULIN LISPRO 100/ML
0 VIAL (ML) SUBCUTANEOUS
Qty: 0 | Refills: 0 | DISCHARGE
Start: 2022-01-31

## 2022-01-31 RX ORDER — SENNA PLUS 8.6 MG/1
2 TABLET ORAL
Qty: 60 | Refills: 0
Start: 2022-01-31 | End: 2022-03-01

## 2022-01-31 RX ORDER — SUCRALFATE 1 G
1 TABLET ORAL
Qty: 60 | Refills: 0
Start: 2022-01-31 | End: 2022-03-01

## 2022-01-31 RX ORDER — INSULIN GLARGINE 100 [IU]/ML
28 INJECTION, SOLUTION SUBCUTANEOUS EVERY MORNING
Refills: 0 | Status: DISCONTINUED | OUTPATIENT
Start: 2022-01-31 | End: 2022-01-31

## 2022-01-31 RX ORDER — METOPROLOL TARTRATE 50 MG
1 TABLET ORAL
Qty: 0 | Refills: 0 | DISCHARGE

## 2022-01-31 RX ADMIN — CHLORHEXIDINE GLUCONATE 1 APPLICATION(S): 213 SOLUTION TOPICAL at 06:02

## 2022-01-31 RX ADMIN — INSULIN GLARGINE 28 UNIT(S): 100 INJECTION, SOLUTION SUBCUTANEOUS at 09:23

## 2022-01-31 RX ADMIN — Medication 25 MILLIGRAM(S): at 06:00

## 2022-01-31 RX ADMIN — Medication 81 MILLIGRAM(S): at 12:04

## 2022-01-31 RX ADMIN — PANTOPRAZOLE SODIUM 40 MILLIGRAM(S): 20 TABLET, DELAYED RELEASE ORAL at 06:01

## 2022-01-31 RX ADMIN — Medication 3 UNIT(S): at 12:02

## 2022-01-31 RX ADMIN — Medication 1 TABLET(S): at 12:04

## 2022-01-31 RX ADMIN — Medication 1 GRAM(S): at 06:00

## 2022-01-31 RX ADMIN — ESCITALOPRAM OXALATE 10 MILLIGRAM(S): 10 TABLET, FILM COATED ORAL at 12:04

## 2022-01-31 RX ADMIN — Medication 3: at 12:03

## 2022-01-31 RX ADMIN — ENOXAPARIN SODIUM 40 MILLIGRAM(S): 100 INJECTION SUBCUTANEOUS at 12:05

## 2022-01-31 NOTE — DISCHARGE NOTE PROVIDER - CARE PROVIDER_API CALL
Casey Christian)  Cardiovascular Disease; Interventional Cardiology  [address]  [place] [address]  Phone: [phone]  Fax: [phone]  Follow Up Time: 1 week    Steve Delatorre)  Gastroenterology; Internal Medicine  [address] [place] [address]  Phone: [phone]  Fax: [phone]  Follow Up Time: 1 week    Elizabeth Galvez)  Medicine  [address] [place] [address]  Phone: [phone]  Fax: [phone]  Follow Up Time: 1-3 days    Ajith Kearney)  Neurology  [address] [place] [address], [address]  [place] [address]  Phone: [phone]  Fax: [phone]  Follow Up Time: 1 week

## 2022-01-31 NOTE — DISCHARGE NOTE PROVIDER - PROVIDER TOKENS
PROVIDER:[TOKEN:[[id number]],FOLLOWUP:[1 week]],PROVIDER:[TOKEN:[[id number]],FOLLOWUP:[1 week]],PROVIDER:[TOKEN:[[id number]],FOLLOWUP:[1-3 days]],PROVIDER:[TOKEN:[[id number]],FOLLOWUP:[1 week]]

## 2022-01-31 NOTE — DISCHARGE NOTE NURSING/CASE MANAGEMENT/SOCIAL WORK - PATIENT PORTAL LINK FT
You can access the FollowMyHealth Patient Portal offered by [facility] by registering at the following website: [email or web address]. By joining [employer]’s FollowMyHealth portal, you will also be able to view your health information using other applications (apps) compatible with our system.

## 2022-01-31 NOTE — DISCHARGE NOTE PROVIDER - NSDCCPCAREPLAN_GEN_ALL_CORE_FT
PRINCIPAL DISCHARGE DIAGNOSIS  Diagnosis: Recurrent syncope  Assessment and Plan of Treatment: outpatient follow up with cardiology, and neurology in one week      SECONDARY DISCHARGE DIAGNOSES  Diagnosis: DM (diabetes mellitus)  Assessment and Plan of Treatment: continue with insulin pump.  starts on 2/1/2022 in the morning follow up with endocrinology in 2-3 days    Diagnosis: GERD (gastroesophageal reflux disease)  Assessment and Plan of Treatment: resume current medications, and follow up with gastroenterology in one week

## 2022-01-31 NOTE — DISCHARGE NOTE PROVIDER - HOSPITAL COURSE
Patient is 35 yo female with hx of Degenerative disc disease, thoracic, Gastroesophageal reflux disease, Intractable headache, major depressive disorder with single episode, Neuropathy, spinal stenosis, Tachycardia, Tremor of right hand, Type 1 diabetes   and urinary tract infection, recurrent presenting with     #Syncope.   -Cardiac monitor shows no arrythemia   -Trop negative x3  -D-dimer negative  -CXR shows no acute process  -TTE shows NL EF.  Negative stress one month ago  -Cardiology recommended outpatient followup    -Neurology recommended VEEG, which showed no seizure activity, outpatient follow up with neurology  -No seizure activity  - orthostatic BP negative      #Type 1 diabetes.   -Anion gap resolved  -Continue with current dose of lantus, and ISS  -Monitor POC  -Discussed with endocrinology    # Major depressive disorder with single episode, in full remission.   -Continue with increased dose of Lexapro  -Psychiatry to follow up     #Abdm pain  -Seems to be secondary to GERD  -Continue with PPI   -Abdm CT scan shows no acute process  -LFT ok     Hospital course, and discharge planning were discussed with patient, and mother in details.  all questions were answered.  seems to understand, and in agreement.  time 70 min

## 2022-01-31 NOTE — PROGRESS NOTE ADULT - ASSESSMENT
36yFemale pmh UTI, DM, admitted with syncope. GI consulted for abdominal pain.       Problem 1-Suprapubic tenderness--->resolved  Rec  -CT scan abdomen and pelvis with IV contrast appreciated  -No Acute GI intervention    Problem 2-Previously noted regions of focal hypoattenuation measuring approximately 1.2 cm and 2.8 cm are redemonstrated without short interval change. Findings suspected to be on the basis of focal fat deposition, however evaluation with MRI can be obtained as previously recommended.  Hepatic steatosis is noted.  Rec  -dietary and lifestyle modifications discussed   - Follow up with our GI office located on [address] [place] [address], Phone number [phone] with Dr. Munoz    Recall GI if needed  36yFemale pmh UTI, DM, admitted with syncope. GI consulted for abdominal pain.       Problem 1-Suprapubic tenderness--->resolved  Rec  -CT scan abdomen and pelvis with IV contrast appreciated  -No Acute GI intervention    Problem 2-Previously noted regions of focal hypoattenuation in the liver  measuring approximately 1.2 cm and 2.8 cm are redemonstrated without short interval change. Findings suspected to be on the basis of focal fat deposition, however evaluation with MRI can be obtained as previously recommended.  Hepatic steatosis is noted.  Rec  -dietary and lifestyle modifications discussed   - Follow up with our GI office located on [address] [place] [address], Phone number [phone] with Dr. Munoz    Recall GI if needed

## 2022-01-31 NOTE — PROGRESS NOTE ADULT - ASSESSMENT
Patient is 35 yo female with hx of Degenerative disc disease, thoracic, Gastroesophageal reflux disease, Intractable headache, major depressive disorder with single episode, Neuropathy, spinal stenosis, Tachycardia, Tremor of right hand, Type 1 diabetes   and urinary tract infection, recurrent presenting with     #Syncope.   -Cardiac monitor shows no arrythemia   -Trop negative x3  -D-dimer negative  -CXR shows no acute process  -TTE shows NL EF.  Negative stress one month ago  -Cardiology recommended outpatient followup    -Neurology recommended VEEG, which showed no seizure activity, outpatient follow up with neurology  -No seizure activity  - orthostatic BP negative      #Type 1 diabetes.   -Anion gap resolved  -Continue with current dose of lantus, and ISS  -Monitor POC  -Discussed with endocrinology    # Major depressive disorder with single episode, in full remission.   -Continue with increased dose of Lexapro  -Psychiatry to follow up     #Abdm pain  -Seems to be secondary to GERD  -Continue with PPI   -Abdm CT scan shows no acute process  -LFT ok       #Progress Note Handoff  Pending (specify):  anticipate for discharge on monday  Family discussion:  plan of care was discussed with patient   in details.  all questions were answered.  seems to understand, and in agreement  Disposition:  unknown

## 2022-01-31 NOTE — DISCHARGE NOTE PROVIDER - NSDCFUSCHEDAPPT_GEN_ALL_CORE_FT
CRISTI MONTGOMERY ; 02/01/2022 ; NPP Endocrin [address] [facility] CRISTI Jacobs ; 02/23/2022 ; NP Neurology [address] CRISTI Jacobs ; 03/14/2022 ; NPP Podiatry [address]

## 2022-01-31 NOTE — DISCHARGE NOTE PROVIDER - NSDCMRMEDTOKEN_GEN_ALL_CORE_FT
amitriptyline 75 mg oral tablet: 1 tab(s) orally once a day (at bedtime)  aspirin 81 mg oral tablet, chewable: 1 tab(s) orally once a day  cyclobenzaprine 10 mg oral tablet: 1 tab(s) orally once a day (at bedtime)  insulin lispro: once a day  ivabradine 5 mg oral tablet: 1 tab(s) orally 2 times a day  Lexapro 5 mg oral tablet: 1 tab(s) orally once a day  metoprolol tartrate 25 mg oral tablet: 1 tab(s) orally 2 times a day  multivitamin: 1 tab(s) orally once a day  pantoprazole 40 mg oral delayed release tablet: 1 tab(s) orally once a day (before a meal)  polyethylene glycol 3350 oral powder for reconstitution: 17 gram(s) orally once a day  senna oral tablet: 2 tab(s) orally once a day (at bedtime)  sucralfate 1 g oral tablet: 1 tab(s) orally 2 times a day

## 2022-01-31 NOTE — DISCHARGE NOTE NURSING/CASE MANAGEMENT/SOCIAL WORK - NSDCPEFALRISK_GEN_ALL_CORE
For information on Fall & Injury Prevention, visit: [email or web address] OR  [email or web address] OR  https://www.cdc.gov/steadi/patient.html

## 2022-01-31 NOTE — PROGRESS NOTE ADULT - PROVIDER SPECIALTY LIST ADULT
Endocrinology
Hospitalist
Gastroenterology
Hospitalist
Hospitalist
Endocrinology
Hospitalist
Hospitalist

## 2022-01-31 NOTE — DISCHARGE NOTE PROVIDER - CARE PROVIDERS DIRECT ADDRESSES
,DirectAddress_Unknown,[email or web address],DirectAddress_Unknown,[email or web address]

## 2022-01-31 NOTE — PROGRESS NOTE ADULT - SUBJECTIVE AND OBJECTIVE BOX
CC.  Syncope  HPI  Patient reports heart burn improving   afebrile  Offers no other complaints             Constitutional: No fever, fatigue or weight loss.  Skin: No rash.  Eyes: No recent vision problems or eye pain.  ENT: No congestion, ear pain, or sore throat.  Endocrine: No thyroid problems.  Cardiovascular: No chest pain or palpation.  Respiratory: No cough, shortness of breath, congestion, or wheezing.  Gastrointestinal: No diarrhea.  Genitourinary: No dysuria.  Musculoskeletal: No joint swelling.  Neurologic: No headache.      Vital Signs Last 24 Hrs  T(C): 36.2 (30 Jan 2022 05:18), Max: 37.2 (29 Jan 2022 20:24)  T(F): 97.2 (30 Jan 2022 05:18), Max: 98.9 (29 Jan 2022 20:24)  HR: 98 (30 Jan 2022 05:18) (98 - 112)  BP: 123/60 (30 Jan 2022 05:18) (119/65 - 123/60)  BP(mean): --  RR: 18 (30 Jan 2022 05:18) (16 - 18)  SpO2: 97% (29 Jan 2022 22:35) (97% - 97%)        PHYSICAL EXAM-  GENERAL: NAD,   HEAD:  Atraumatic, Normocephalic  EYES: EOMI, PERRLA, conjunctiva and sclera clear  NECK: Supple, No JVD, Normal thyroid  NERVOUS SYSTEM:  Alert & Oriented X3, Moving all extremities  CHEST/LUNG: Clear to percussion bilaterally; No rales, rhonchi, wheezing, or rubs  HEART: Regular rate and rhythm; No murmurs, rubs, or gallops  ABDOMEN: Soft, min epigastric tenderness , Nondistended; Bowel sounds present  EXTREMITIES:    No clubbing, cyanosis, or edema  SKIN: No rashes or lesions                               9.8    9.44  )-----------( 428      ( 29 Jan 2022 08:46 )             30.7     01-29    136  |  103  |  8<L>  ----------------------------<  162<H>  4.0   |  20  |  0.5<L>    Ca    8.6      29 Jan 2022 08:46    TPro  6.3  /  Alb  3.5  /  TBili  1.1  /  DBili  x   /  AST  19  /  ALT  32  /  AlkPhos  103  01-29                         MEDICATIONS  (STANDING):  amitriptyline 75 milliGRAM(s) Oral at bedtime  aspirin  chewable 81 milliGRAM(s) Oral daily  chlorhexidine 4% Liquid 1 Application(s) Topical <User Schedule>  dextrose 40% Gel 15 Gram(s) Oral once  dextrose 5%. 1000 milliLiter(s) (50 mL/Hr) IV Continuous <Continuous>  dextrose 5%. 1000 milliLiter(s) (100 mL/Hr) IV Continuous <Continuous>  dextrose 50% Injectable 25 Gram(s) IV Push once  dextrose 50% Injectable 12.5 Gram(s) IV Push once  dextrose 50% Injectable 25 Gram(s) IV Push once  enoxaparin Injectable 40 milliGRAM(s) SubCutaneous daily  escitalopram 10 milliGRAM(s) Oral daily  glucagon  Injectable 1 milliGRAM(s) IntraMuscular once  insulin glargine Injectable (LANTUS) 30 Unit(s) SubCutaneous every morning  insulin lispro (ADMELOG) corrective regimen sliding scale   SubCutaneous three times a day before meals  metoprolol tartrate 25 milliGRAM(s) Oral every 8 hours  multivitamin 1 Tablet(s) Oral daily  pantoprazole    Tablet 40 milliGRAM(s) Oral before breakfast  polyethylene glycol 3350 17 Gram(s) Oral daily  senna 2 Tablet(s) Oral at bedtime    MEDICATIONS  (PRN):  cyclobenzaprine 10 milliGRAM(s) Oral at bedtime PRN Muscle Spasm  hydrOXYzine hydrochloride 50 milliGRAM(s) Oral every 6 hours PRN Anxiety  ondansetron Injectable 4 milliGRAM(s) IV Push every 4 hours PRN Nausea and/or Vomiting                                       Imaging Personally Reviewed:     [x ] YES  [ ] NO    Consultant(s) Notes Reviewed:  [x ] YES  [ ] NO    Care Discussed with Consultants/Other Providers [x ] YES  [ ] No medical contraindication for discharge  
S: fs noted , despite lowering Lantus to 30 units , Bg dropped after getting 1 unit correction   O:Vital Signs Last 24 Hrs  T(C): 36.7 (29 Jan 2022 13:19), Max: 37.3 (28 Jan 2022 21:00)  T(F): 98.1 (29 Jan 2022 13:19), Max: 99.2 (28 Jan 2022 21:00)  HR: 107 (29 Jan 2022 13:19) (101 - 107)  BP: 119/65 (29 Jan 2022 13:19) (117/56 - 131/70)  BP(mean): --  RR: 16 (29 Jan 2022 13:19) (16 - 18)  SpO2: 100% (28 Jan 2022 21:03) (100% - 100%)      MEDICATIONS  (STANDING):  amitriptyline 75 milliGRAM(s) Oral at bedtime  aspirin  chewable 81 milliGRAM(s) Oral daily  chlorhexidine 4% Liquid 1 Application(s) Topical <User Schedule>  dextrose 40% Gel 15 Gram(s) Oral once  dextrose 5%. 1000 milliLiter(s) (50 mL/Hr) IV Continuous <Continuous>  dextrose 5%. 1000 milliLiter(s) (100 mL/Hr) IV Continuous <Continuous>  dextrose 50% Injectable 25 Gram(s) IV Push once  dextrose 50% Injectable 12.5 Gram(s) IV Push once  dextrose 50% Injectable 25 Gram(s) IV Push once  enoxaparin Injectable 40 milliGRAM(s) SubCutaneous daily  escitalopram 10 milliGRAM(s) Oral daily  glucagon  Injectable 1 milliGRAM(s) IntraMuscular once  metoprolol tartrate 25 milliGRAM(s) Oral every 8 hours  multivitamin 1 Tablet(s) Oral daily  pantoprazole    Tablet 40 milliGRAM(s) Oral before breakfast  polyethylene glycol 3350 17 Gram(s) Oral daily  senna 2 Tablet(s) Oral at bedtime    MEDICATIONS  (PRN):  aluminum hydroxide/magnesium hydroxide/simethicone Suspension 30 milliLiter(s) Oral every 6 hours PRN Dyspepsia  cyclobenzaprine 10 milliGRAM(s) Oral at bedtime PRN Muscle Spasm  hydrOXYzine hydrochloride 50 milliGRAM(s) Oral every 6 hours PRN Anxiety  ondansetron Injectable 4 milliGRAM(s) IV Push every 4 hours PRN Nausea and/or Vomiting      CAPILLARY BLOOD GLUCOSE      POCT Blood Glucose.: 127 mg/dL (29 Jan 2022 17:34)  POCT Blood Glucose.: 57 mg/dL (29 Jan 2022 16:50)  POCT Blood Glucose.: 157 mg/dL (29 Jan 2022 11:38)  POCT Blood Glucose.: 156 mg/dL (29 Jan 2022 10:39)  POCT Blood Glucose.: 79 mg/dL (29 Jan 2022 07:44)  POCT Blood Glucose.: 135 mg/dL (29 Jan 2022 04:19)  POCT Blood Glucose.: 186 mg/dL (28 Jan 2022 22:40)  POCT Blood Glucose.: 84 mg/dL (28 Jan 2022 21:02)  
CC.  Syncope  HPI  Patient reports that she feels better  had an episode of hard to wake where he eyelid is blinking, and appears to be responsive, when mentioned to that no seizure activity on the monitor, patient became wide awake            Constitutional: No fever, fatigue or weight loss.  Skin: No rash.  Eyes: No recent vision problems or eye pain.  ENT: No congestion, ear pain, or sore throat.  Endocrine: No thyroid problems.  Cardiovascular: No chest pain or palpation.  Respiratory: No cough, shortness of breath, congestion, or wheezing.  Gastrointestinal: No abdominal pain, nausea, vomiting, or diarrhea.  Genitourinary: No dysuria.  Musculoskeletal: No joint swelling.  Neurologic: No headache.      Vital Signs Last 24 Hrs  T(C): 36.1 (22 @ 15:50), Max: 37.2 (22 @ 18:30)  T(F): 97 (22 @ 15:50), Max: 99 (22 @ 18:30)  HR: 113 (22 @ 15:50) (108 - 113)  BP: 122/60 (22 @ 15:50) (115/63 - 125/65)  BP(mean): --  RR: 18 (22 @ 15:50) (16 - 18)  SpO2: 100% (22 @ 08:59) (100% - 100%)        PHYSICAL EXAM-  GENERAL: NAD,   HEAD:  Atraumatic, Normocephalic  EYES: EOMI, PERRLA, conjunctiva and sclera clear  NECK: Supple, No JVD, Normal thyroid  NERVOUS SYSTEM:  Alert & Oriented X3, Moving all extremities  CHEST/LUNG: Clear to percussion bilaterally; No rales, rhonchi, wheezing, or rubs  HEART: Regular rate and rhythm; No murmurs, rubs, or gallops  ABDOMEN: Soft, Nontender, Nondistended; Bowel sounds present  EXTREMITIES:    No clubbing, cyanosis, or edema  SKIN: No rashes or lesions                                  11.3   11.79 )-----------( 426      ( 2022 02:55 )             36.2         135  |  101  |  9<L>  ----------------------------<  146<H>  4.3   |  19  |  0.7    Ca    9.7      2022 05:25  Mg     1.8         TPro  7.9  /  Alb  4.5  /  TBili  0.4  /  DBili  x   /  AST  40  /  ALT  73<H>  /  AlkPhos  132<H>      CARDIAC MARKERS ( 2022 18:50 )  x     / <0.01 ng/mL / 58 U/L / x     / 1.7 ng/mL  CARDIAC MARKERS ( 2022 12:59 )  x     / <0.01 ng/mL / 58 U/L / x     / 1.9 ng/mL  CARDIAC MARKERS ( 2022 02:55 )  x     / <0.01 ng/mL / 58 U/L / x     / x            Urinalysis Basic - ( 2022 04:20 )    Color: Yellow / Appearance: Clear / S.025 / pH: x  Gluc: x / Ketone: 160  / Bili: Negative / Urobili: 0.2 mg/dL   Blood: x / Protein: Negative mg/dL / Nitrite: Negative   Leuk Esterase: Negative / RBC: x / WBC x   Sq Epi: x / Non Sq Epi: x / Bacteria: x      PT/INR - ( 2022 02:55 )   PT: 14.20 sec;   INR: 1.24 ratio         PTT - ( 2022 02:55 )  PTT:35.8 sec        MEDICATIONS  (STANDING):  amitriptyline 75 milliGRAM(s) Oral at bedtime  aspirin  chewable 81 milliGRAM(s) Oral daily  chlorhexidine 4% Liquid 1 Application(s) Topical <User Schedule>  dextrose 40% Gel 15 Gram(s) Oral once  dextrose 5%. 1000 milliLiter(s) (50 mL/Hr) IV Continuous <Continuous>  dextrose 5%. 1000 milliLiter(s) (100 mL/Hr) IV Continuous <Continuous>  dextrose 50% Injectable 25 Gram(s) IV Push once  dextrose 50% Injectable 12.5 Gram(s) IV Push once  dextrose 50% Injectable 25 Gram(s) IV Push once  enoxaparin Injectable 40 milliGRAM(s) SubCutaneous daily  escitalopram 10 milliGRAM(s) Oral daily  glucagon  Injectable 1 milliGRAM(s) IntraMuscular once  insulin glargine Injectable (LANTUS) 25 Unit(s) SubCutaneous every morning  insulin lispro (ADMELOG) corrective regimen sliding scale   SubCutaneous three times a day before meals  insulin lispro Injectable (ADMELOG) 5 Unit(s) SubCutaneous three times a day before meals  metoprolol tartrate 25 milliGRAM(s) Oral every 8 hours  multivitamin 1 Tablet(s) Oral daily  pantoprazole    Tablet 40 milliGRAM(s) Oral before breakfast    MEDICATIONS  (PRN):  cyclobenzaprine 10 milliGRAM(s) Oral at bedtime PRN Muscle Spasm  hydrOXYzine hydrochloride 50 milliGRAM(s) Oral every 6 hours PRN Anxiety  ondansetron Injectable 4 milliGRAM(s) IV Push every 4 hours PRN Nausea and/or Vomiting      Imaging Personally Reviewed:     [x ] YES  [ ] NO    Consultant(s) Notes Reviewed:  [x ] YES  [ ] NO    Care Discussed with Consultants/Other Providers [x ] YES  [ ] No medical contraindication for discharge  
FS reviewed     CAPILLARY BLOOD GLUCOSE      POCT Blood Glucose.: 340 mg/dL (28 Jan 2022 07:39)  POCT Blood Glucose.: 278 mg/dL (27 Jan 2022 20:50)  POCT Blood Glucose.: 394 mg/dL (27 Jan 2022 16:15)  POCT Blood Glucose.: 286 mg/dL (27 Jan 2022 12:51)  POCT Blood Glucose.: 277 mg/dL (27 Jan 2022 11:14)      MEDICATIONS  (STANDING):  amitriptyline 75 milliGRAM(s) Oral at bedtime  aspirin  chewable 81 milliGRAM(s) Oral daily  chlorhexidine 4% Liquid 1 Application(s) Topical <User Schedule>  dextrose 40% Gel 15 Gram(s) Oral once  dextrose 5%. 1000 milliLiter(s) (50 mL/Hr) IV Continuous <Continuous>  dextrose 5%. 1000 milliLiter(s) (100 mL/Hr) IV Continuous <Continuous>  dextrose 50% Injectable 25 Gram(s) IV Push once  dextrose 50% Injectable 12.5 Gram(s) IV Push once  dextrose 50% Injectable 25 Gram(s) IV Push once  enoxaparin Injectable 40 milliGRAM(s) SubCutaneous daily  escitalopram 10 milliGRAM(s) Oral daily  glucagon  Injectable 1 milliGRAM(s) IntraMuscular once  insulin glargine Injectable (LANTUS) 25 Unit(s) SubCutaneous every morning  insulin lispro (ADMELOG) corrective regimen sliding scale   SubCutaneous three times a day before meals  insulin lispro Injectable (ADMELOG) 5 Unit(s) SubCutaneous three times a day before meals  metoprolol tartrate 25 milliGRAM(s) Oral every 8 hours  multivitamin 1 Tablet(s) Oral daily  pantoprazole    Tablet 40 milliGRAM(s) Oral before breakfast  sodium chloride 0.9%. 1000 milliLiter(s) (100 mL/Hr) IV Continuous <Continuous>    MEDICATIONS  (PRN):  cyclobenzaprine 10 milliGRAM(s) Oral at bedtime PRN Muscle Spasm  hydrOXYzine hydrochloride 50 milliGRAM(s) Oral every 6 hours PRN Anxiety  ondansetron Injectable 4 milliGRAM(s) IV Push every 4 hours PRN Nausea and/or Vomiting  
36yFemale  Being followed for abdominal pain  Interval history: Patient denies nausea, vomiting, hematemesis, melena, blood in stool, diarrhea, constipation, abdominal pain. Patient reports abdominal pain resolved.      PAST MEDICAL & SURGICAL HISTORY:   Neuropathy  right lower extremity, vaginal    Type 1 diabetes    Degenerative disc disease, thoracic    Urinary tract infection, recurrent    Gastroesophageal reflux disease, esophagitis presence not specified    Intractable headache, unspecified chronicity pattern, unspecified headache type    Major depressive disorder with single episode, in full remission  previously treated with zyprexa, celexa and lexapro    Tremor of right hand    Tachycardia    Spinal stenosis            Social History: No smoking. No alcohol. No illegal drug use.          MEDICATIONS  (STANDING):  amitriptyline 75 milliGRAM(s) Oral at bedtime  aspirin  chewable 81 milliGRAM(s) Oral daily  chlorhexidine 4% Liquid 1 Application(s) Topical <User Schedule>  dextrose 40% Gel 15 Gram(s) Oral once  dextrose 5%. 1000 milliLiter(s) (50 mL/Hr) IV Continuous <Continuous>  dextrose 5%. 1000 milliLiter(s) (100 mL/Hr) IV Continuous <Continuous>  dextrose 50% Injectable 25 Gram(s) IV Push once  dextrose 50% Injectable 12.5 Gram(s) IV Push once  dextrose 50% Injectable 25 Gram(s) IV Push once  enoxaparin Injectable 40 milliGRAM(s) SubCutaneous daily  escitalopram 10 milliGRAM(s) Oral daily  glucagon  Injectable 1 milliGRAM(s) IntraMuscular once  insulin glargine Injectable (LANTUS) 28 Unit(s) SubCutaneous every morning  insulin lispro (ADMELOG) corrective regimen sliding scale   SubCutaneous three times a day before meals  insulin lispro Injectable (ADMELOG) 3 Unit(s) SubCutaneous three times a day before meals  metoprolol tartrate 25 milliGRAM(s) Oral every 8 hours  multivitamin 1 Tablet(s) Oral daily  pantoprazole    Tablet 40 milliGRAM(s) Oral before breakfast  polyethylene glycol 3350 17 Gram(s) Oral daily  senna 2 Tablet(s) Oral at bedtime  sucralfate 1 Gram(s) Oral two times a day    MEDICATIONS  (PRN):  aluminum hydroxide/magnesium hydroxide/simethicone Suspension 30 milliLiter(s) Oral every 6 hours PRN Dyspepsia  cyclobenzaprine 10 milliGRAM(s) Oral at bedtime PRN Muscle Spasm  hydrOXYzine hydrochloride 50 milliGRAM(s) Oral every 6 hours PRN Anxiety  ondansetron Injectable 4 milliGRAM(s) IV Push every 4 hours PRN Nausea and/or Vomiting      Allergies:   Ceclor (Rash)  clindamycin (Hives; Nephrotoxicity)  penicillins (Hives)  Intolerances  Cipro (Other)  gabapentin (Other)  Influenza Virus Vaccine, H5N1, Inactivated (Other)        REVIEW OF SYSTEMS:  General:  No weight loss, fevers, or chills.  Eyes:  No reported pain or visual changes  ENT:  No sore throat or runny nose.  NECK: No stiffness   CV:  No chest pain or palpitations.  Resp:  No shortness of breath, cough  GI:  No abdominal pain, nausea, vomiting, dysphagia, diarrhea or constipation. No rectal bleeding, melena, or hematemesis.  Muscle:  No aches or weakness  Neuro:  No tingling, numbness           VITAL SIGNS:   T(F): 97.4 (01-31-22 @ 04:34), Max: 99.4 (01-30-22 @ 21:26)  HR: 92 (01-31-22 @ 04:34) (88 - 101)  BP: 134/78 (01-31-22 @ 04:34) (124/72 - 136/68)  RR: 18 (01-31-22 @ 04:34) (18 - 18)  SpO2: 96% (01-31-22 @ 05:38) (96% - 96%)    PHYSICAL EXAM:  GENERAL: AAOx3, no acute distress.  HEAD:  Atraumatic, Normocephalic  EYES: conjunctiva and sclera clear  NECK: Supple, no JVD or thyromegaly  CHEST/LUNG: Clear to auscultation bilaterally; No wheeze, rhonchi, or rales  HEART: Regular rate and rhythm; normal S1, S2, No murmurs.  ABDOMEN: Soft, nontender, nondistended; Bowel sounds present  NEUROLOGY: No asterixis or tremor.   SKIN: Intact, no jaundice            LABS:      no labs so far today          IMAGING:    < from: CT Abdomen and Pelvis w/ IV Cont (01.28.22 @ 17:00) >    ACC: [id number] EXAM:  CT ABDOMEN AND PELVIS IC                          PROCEDURE DATE:  01/28/2022          INTERPRETATION:  REASON FOR EXAM / CLINICAL STATEMENT:  Abdominal pain.   WBC 17.40    PMHx of Neuropathy, right lower extremity, Type 1 diabetes,   Degenerative disc disease, thoracic,Spinal stenosis. Urinary tract   infection, Gastroesophageal reflux disease, esophagitis, headache, Major   depressive disorder    TECHNIQUE:  Contiguous axial CT images were obtained from the lower chest   to the pubic symphysis with IV contrast.  Reformatted images in the   coronal and sagittal planes were acquired.      COMPARISON CT: CT scan of the abdomen and pelvis dated 12/2/2021    OTHER STUDIES USED FOR CORRELATION: None.    FINDINGS:    TUBES AND LINES: None.    LOWER CHEST: The lung bases are clear. No pleural or pericardial effusion.    HEPATIC: The liver is normal in size with no evidence of solid mass or   bile duct dilatation. Hepatic steatosis is noted. The portal vein is   patent. The hepatic veins are opacified.    BILIARY: No calcified gallstones are noted.    SPLEEN: Unremarkable.    PANCREAS: The pancreas is normal in size and configuration. No evidence   of mass or pancreatitis.    ADRENAL GLANDS: Unremarkable.    KIDNEYS: There is symmetric renal enhancement. No evidence of   hydronephrosis, calcified stones, or solid mass.    ABDOMINOPELVIC NODES: Unremarkable.    PELVIC ORGANS: 2.6 cm right ovarian cyst. No evidence of pelvic mass,   lymphadenopathy, or fluid collection.    BLADDER: Unremarkable.    PERITONEUM/MESENTERY/BOWEL: No evidence of bowel obstruction, colitis,   inflammatory process, or ascites. No pneumoperitoneum.  The appendix is   normal in appearance. Small nonspecific mesenteric lymph nodes are noted   in the pericecal region.    BONES/SOFT TISSUES: Degenerative changes of the spine are noted at L5-S1.    OTHER: Normal caliber aorta.      IMPRESSION:    No evidence of abdominal or pelvic mass or inflammatory process    --- End of Report ---            ANTONIO TREADWELL MD; Attending Interventional Radiologist  This document has been electronically signed. Jan 28 2022  5:36PM    < end of copied text >        
CC.  Syncope  HPI  Patient reports heart burn  afebrile  Offers no other complaints             Constitutional: No fever, fatigue or weight loss.  Skin: No rash.  Eyes: No recent vision problems or eye pain.  ENT: No congestion, ear pain, or sore throat.  Endocrine: No thyroid problems.  Cardiovascular: No chest pain or palpation.  Respiratory: No cough, shortness of breath, congestion, or wheezing.  Gastrointestinal: No diarrhea.  Genitourinary: No dysuria.  Musculoskeletal: No joint swelling.  Neurologic: No headache.      Vital Signs Last 24 Hrs  T(C): 36.8 (29 Jan 2022 05:00), Max: 37.3 (28 Jan 2022 21:00)  T(F): 98.2 (29 Jan 2022 05:00), Max: 99.2 (28 Jan 2022 21:00)  HR: 101 (29 Jan 2022 05:00) (92 - 105)  BP: 131/70 (29 Jan 2022 05:00) (117/56 - 131/70)  BP(mean): --  RR: 18 (29 Jan 2022 05:00) (16 - 18)  SpO2: 100% (28 Jan 2022 21:03) (100% - 100%)        PHYSICAL EXAM-  GENERAL: NAD,   HEAD:  Atraumatic, Normocephalic  EYES: EOMI, PERRLA, conjunctiva and sclera clear  NECK: Supple, No JVD, Normal thyroid  NERVOUS SYSTEM:  Alert & Oriented X3, Moving all extremities  CHEST/LUNG: Clear to percussion bilaterally; No rales, rhonchi, wheezing, or rubs  HEART: Regular rate and rhythm; No murmurs, rubs, or gallops  ABDOMEN: Soft, min epigastric tenderness , Nondistended; Bowel sounds present  EXTREMITIES:    No clubbing, cyanosis, or edema  SKIN: No rashes or lesions                              10.9   17.40 )-----------( 520      ( 28 Jan 2022 07:11 )             35.9     01-28    134<L>  |  104  |  11  ----------------------------<  129<H>  4.5   |  16<L>  |  0.8    Ca    9.3      28 Jan 2022 15:30    TPro  7.7  /  Alb  4.3  /  TBili  0.3  /  DBili  x   /  AST  16  /  ALT  46<H>  /  AlkPhos  133<H>  01-28               MEDICATIONS  (STANDING):  amitriptyline 75 milliGRAM(s) Oral at bedtime  aspirin  chewable 81 milliGRAM(s) Oral daily  chlorhexidine 4% Liquid 1 Application(s) Topical <User Schedule>  dextrose 40% Gel 15 Gram(s) Oral once  dextrose 5%. 1000 milliLiter(s) (50 mL/Hr) IV Continuous <Continuous>  dextrose 5%. 1000 milliLiter(s) (100 mL/Hr) IV Continuous <Continuous>  dextrose 50% Injectable 25 Gram(s) IV Push once  dextrose 50% Injectable 12.5 Gram(s) IV Push once  dextrose 50% Injectable 25 Gram(s) IV Push once  enoxaparin Injectable 40 milliGRAM(s) SubCutaneous daily  escitalopram 10 milliGRAM(s) Oral daily  glucagon  Injectable 1 milliGRAM(s) IntraMuscular once  insulin glargine Injectable (LANTUS) 30 Unit(s) SubCutaneous every morning  insulin lispro (ADMELOG) corrective regimen sliding scale   SubCutaneous three times a day before meals  metoprolol tartrate 25 milliGRAM(s) Oral every 8 hours  multivitamin 1 Tablet(s) Oral daily  pantoprazole    Tablet 40 milliGRAM(s) Oral before breakfast  polyethylene glycol 3350 17 Gram(s) Oral daily  senna 2 Tablet(s) Oral at bedtime    MEDICATIONS  (PRN):  cyclobenzaprine 10 milliGRAM(s) Oral at bedtime PRN Muscle Spasm  hydrOXYzine hydrochloride 50 milliGRAM(s) Oral every 6 hours PRN Anxiety  ondansetron Injectable 4 milliGRAM(s) IV Push every 4 hours PRN Nausea and/or Vomiting                                       Imaging Personally Reviewed:     [x ] YES  [ ] NO    Consultant(s) Notes Reviewed:  [x ] YES  [ ] NO    Care Discussed with Consultants/Other Providers [x ] YES  [ ] No medical contraindication for discharge  
CC.  Syncope  HPI  Patient reports epigastric pain with multiple episode of N/V overnight  afebrile  Offers no other complaints             Constitutional: No fever, fatigue or weight loss.  Skin: No rash.  Eyes: No recent vision problems or eye pain.  ENT: No congestion, ear pain, or sore throat.  Endocrine: No thyroid problems.  Cardiovascular: No chest pain or palpation.  Respiratory: No cough, shortness of breath, congestion, or wheezing.  Gastrointestinal: No diarrhea.  Genitourinary: No dysuria.  Musculoskeletal: No joint swelling.  Neurologic: No headache.      Vital Signs Last 24 Hrs  T(C): 36.6 (28 Jan 2022 14:39), Max: 37.1 (27 Jan 2022 20:35)  T(F): 97.8 (28 Jan 2022 14:39), Max: 98.7 (27 Jan 2022 20:35)  HR: 92 (28 Jan 2022 14:39) (92 - 105)  BP: 126/63 (28 Jan 2022 14:39) (107/56 - 126/63)  BP(mean): --  RR: 18 (28 Jan 2022 14:39) (18 - 18)  SpO2: 100% (27 Jan 2022 20:35) (100% - 100%)        PHYSICAL EXAM-  GENERAL: NAD,   HEAD:  Atraumatic, Normocephalic  EYES: EOMI, PERRLA, conjunctiva and sclera clear  NECK: Supple, No JVD, Normal thyroid  NERVOUS SYSTEM:  Alert & Oriented X3, Moving all extremities  CHEST/LUNG: Clear to percussion bilaterally; No rales, rhonchi, wheezing, or rubs  HEART: Regular rate and rhythm; No murmurs, rubs, or gallops  ABDOMEN: Soft, min epigastric tenderness , Nondistended; Bowel sounds present  EXTREMITIES:    No clubbing, cyanosis, or edema  SKIN: No rashes or lesions                                10.9   17.40 )-----------( 520      ( 28 Jan 2022 07:11 )             35.9     01-28    134<L>  |  104  |  11  ----------------------------<  129<H>  4.5   |  16<L>  |  0.8    Ca    9.3      28 Jan 2022 15:30    TPro  7.7  /  Alb  4.3  /  TBili  0.3  /  DBili  x   /  AST  16  /  ALT  46<H>  /  AlkPhos  133<H>  01-28    CARDIAC MARKERS ( 26 Jan 2022 18:50 )  x     / <0.01 ng/mL / 58 U/L / x     / 1.7 ng/mL      MEDICATIONS  (STANDING):  amitriptyline 75 milliGRAM(s) Oral at bedtime  aspirin  chewable 81 milliGRAM(s) Oral daily  chlorhexidine 4% Liquid 1 Application(s) Topical <User Schedule>  dextrose 40% Gel 15 Gram(s) Oral once  dextrose 5%. 1000 milliLiter(s) (50 mL/Hr) IV Continuous <Continuous>  dextrose 5%. 1000 milliLiter(s) (100 mL/Hr) IV Continuous <Continuous>  dextrose 50% Injectable 25 Gram(s) IV Push once  dextrose 50% Injectable 12.5 Gram(s) IV Push once  dextrose 50% Injectable 25 Gram(s) IV Push once  enoxaparin Injectable 40 milliGRAM(s) SubCutaneous daily  escitalopram 10 milliGRAM(s) Oral daily  glucagon  Injectable 1 milliGRAM(s) IntraMuscular once  insulin lispro (ADMELOG) corrective regimen sliding scale   SubCutaneous three times a day before meals  metoprolol tartrate 25 milliGRAM(s) Oral every 8 hours  multivitamin 1 Tablet(s) Oral daily  pantoprazole    Tablet 40 milliGRAM(s) Oral before breakfast  polyethylene glycol 3350 17 Gram(s) Oral daily  senna 2 Tablet(s) Oral at bedtime  sodium chloride 0.9%. 1000 milliLiter(s) (100 mL/Hr) IV Continuous <Continuous>    MEDICATIONS  (PRN):  cyclobenzaprine 10 milliGRAM(s) Oral at bedtime PRN Muscle Spasm  hydrOXYzine hydrochloride 50 milliGRAM(s) Oral every 6 hours PRN Anxiety  ondansetron Injectable 4 milliGRAM(s) IV Push every 4 hours PRN Nausea and/or Vomiting                                Imaging Personally Reviewed:     [x ] YES  [ ] NO    Consultant(s) Notes Reviewed:  [x ] YES  [ ] NO    Care Discussed with Consultants/Other Providers [x ] YES  [ ] No medical contraindication for discharge  
CC.  Syncope  HPI  Patient reports heart burn improving   afebrile  Offers no other complaints             Constitutional: No fever, fatigue or weight loss.  Skin: No rash.  Eyes: No recent vision problems or eye pain.  ENT: No congestion, ear pain, or sore throat.  Endocrine: No thyroid problems.  Cardiovascular: No chest pain or palpation.  Respiratory: No cough, shortness of breath, congestion, or wheezing.  Gastrointestinal: No diarrhea.  Genitourinary: No dysuria.  Musculoskeletal: No joint swelling.  Neurologic: No headache.      Vital Signs Last 24 Hrs  T(C): 36.3 (31 Jan 2022 04:34), Max: 37.4 (30 Jan 2022 21:26)  T(F): 97.4 (31 Jan 2022 04:34), Max: 99.4 (30 Jan 2022 21:26)  HR: 92 (31 Jan 2022 04:34) (88 - 101)  BP: 134/78 (31 Jan 2022 04:34) (124/72 - 136/68)  BP(mean): --  RR: 18 (31 Jan 2022 04:34) (18 - 18)  SpO2: 96% (31 Jan 2022 05:38) (96% - 96%)      PHYSICAL EXAM-  GENERAL: NAD,   HEAD:  Atraumatic, Normocephalic  EYES: EOMI, PERRLA, conjunctiva and sclera clear  NECK: Supple, No JVD, Normal thyroid  NERVOUS SYSTEM:  Alert & Oriented X3, Moving all extremities  CHEST/LUNG: Clear to percussion bilaterally; No rales, rhonchi, wheezing, or rubs  HEART: Regular rate and rhythm; No murmurs, rubs, or gallops  ABDOMEN: Soft, min epigastric tenderness , Nondistended; Bowel sounds present  EXTREMITIES:    No clubbing, cyanosis, or edema  SKIN: No rashes or lesions                           MEDICATIONS  (STANDING):  amitriptyline 75 milliGRAM(s) Oral at bedtime  aspirin  chewable 81 milliGRAM(s) Oral daily  chlorhexidine 4% Liquid 1 Application(s) Topical <User Schedule>  dextrose 40% Gel 15 Gram(s) Oral once  dextrose 5%. 1000 milliLiter(s) (50 mL/Hr) IV Continuous <Continuous>  dextrose 5%. 1000 milliLiter(s) (100 mL/Hr) IV Continuous <Continuous>  dextrose 50% Injectable 25 Gram(s) IV Push once  dextrose 50% Injectable 12.5 Gram(s) IV Push once  dextrose 50% Injectable 25 Gram(s) IV Push once  enoxaparin Injectable 40 milliGRAM(s) SubCutaneous daily  escitalopram 10 milliGRAM(s) Oral daily  glucagon  Injectable 1 milliGRAM(s) IntraMuscular once  insulin glargine Injectable (LANTUS) 30 Unit(s) SubCutaneous every morning  insulin lispro (ADMELOG) corrective regimen sliding scale   SubCutaneous three times a day before meals  metoprolol tartrate 25 milliGRAM(s) Oral every 8 hours  multivitamin 1 Tablet(s) Oral daily  pantoprazole    Tablet 40 milliGRAM(s) Oral before breakfast  polyethylene glycol 3350 17 Gram(s) Oral daily  senna 2 Tablet(s) Oral at bedtime    MEDICATIONS  (PRN):  cyclobenzaprine 10 milliGRAM(s) Oral at bedtime PRN Muscle Spasm  hydrOXYzine hydrochloride 50 milliGRAM(s) Oral every 6 hours PRN Anxiety  ondansetron Injectable 4 milliGRAM(s) IV Push every 4 hours PRN Nausea and/or Vomiting                                       Imaging Personally Reviewed:     [x ] YES  [ ] NO    Consultant(s) Notes Reviewed:  [x ] YES  [ ] NO    Care Discussed with Consultants/Other Providers [x ] YES  [ ] No medical contraindication for discharge

## 2022-01-31 NOTE — DISCHARGE NOTE PROVIDER - NSDCFUADDAPPT_GEN_ALL_CORE_FT
Please follow up with your doctor, and your specialist as indicated above  Please come back to the hospital if you developed any new symptoms or concerns

## 2022-02-03 NOTE — CDI QUERY NOTE - NSCDIOTHERTXTBX_GEN_ALL_CORE_HH
_______________________________________________________________________________________________________________________________    Clinical Indicators:  Lab:  Covid 19PCR :  Not Detected: - 2022                                            Detected- 2022, 2022  PN: Attendin2022: COVID-19 infection:   Still with Cough.  COVID-PCR negative.  No hypoxia, no fever.     2022:  D/C note: Attending:   # Hx of COVID-19 PNA, resolved  - c/w Robitussin 10mL Q4 PRN Cough  - Currently COVID neg  - On RA, afebrile, no indication for steroid    Based on your professional judgment and clinical indicators, can the diagnosis of Covid 19 infection be further specified as:     []  Covid 19 infection, symptomatic, resolving  []  Covid 19 infection, resolved, personal/past medical history  []  Other (please specify)  []  Clinically unable to determine    Thank you.

## 2022-02-07 ENCOUNTER — NON-APPOINTMENT (OUTPATIENT)
Age: 37
End: 2022-02-07

## 2022-02-08 ENCOUNTER — NON-APPOINTMENT (OUTPATIENT)
Age: 37
End: 2022-02-08

## 2022-02-08 DIAGNOSIS — R55 SYNCOPE AND COLLAPSE: ICD-10-CM

## 2022-02-08 DIAGNOSIS — K21.9 GASTRO-ESOPHAGEAL REFLUX DISEASE WITHOUT ESOPHAGITIS: ICD-10-CM

## 2022-02-08 DIAGNOSIS — K58.9 IRRITABLE BOWEL SYNDROME WITHOUT DIARRHEA: ICD-10-CM

## 2022-02-08 DIAGNOSIS — R25.1 TREMOR, UNSPECIFIED: ICD-10-CM

## 2022-02-08 DIAGNOSIS — G89.29 OTHER CHRONIC PAIN: ICD-10-CM

## 2022-02-08 DIAGNOSIS — Z88.0 ALLERGY STATUS TO PENICILLIN: ICD-10-CM

## 2022-02-08 DIAGNOSIS — M54.9 DORSALGIA, UNSPECIFIED: ICD-10-CM

## 2022-02-08 DIAGNOSIS — Z88.1 ALLERGY STATUS TO OTHER ANTIBIOTIC AGENTS STATUS: ICD-10-CM

## 2022-02-08 DIAGNOSIS — I10 ESSENTIAL (PRIMARY) HYPERTENSION: ICD-10-CM

## 2022-02-08 DIAGNOSIS — F41.1 GENERALIZED ANXIETY DISORDER: ICD-10-CM

## 2022-02-08 DIAGNOSIS — F32.5 MAJOR DEPRESSIVE DISORDER, SINGLE EPISODE, IN FULL REMISSION: ICD-10-CM

## 2022-02-08 DIAGNOSIS — E11.65 TYPE 2 DIABETES MELLITUS WITH HYPERGLYCEMIA: ICD-10-CM

## 2022-02-08 DIAGNOSIS — U07.1 COVID-19: ICD-10-CM

## 2022-02-08 DIAGNOSIS — Z96.41 PRESENCE OF INSULIN PUMP (EXTERNAL) (INTERNAL): ICD-10-CM

## 2022-02-08 DIAGNOSIS — E10.40 TYPE 1 DIABETES MELLITUS WITH DIABETIC NEUROPATHY, UNSPECIFIED: ICD-10-CM

## 2022-02-08 DIAGNOSIS — E66.9 OBESITY, UNSPECIFIED: ICD-10-CM

## 2022-02-11 ENCOUNTER — NON-APPOINTMENT (OUTPATIENT)
Age: 37
End: 2022-02-11

## 2022-02-13 ENCOUNTER — RX RENEWAL (OUTPATIENT)
Age: 37
End: 2022-02-13

## 2022-02-17 ENCOUNTER — NON-APPOINTMENT (OUTPATIENT)
Age: 37
End: 2022-02-17

## 2022-02-18 ENCOUNTER — APPOINTMENT (OUTPATIENT)
Dept: NUTRITION | Facility: CLINIC | Age: 37
End: 2022-02-18

## 2022-02-18 ENCOUNTER — OUTPATIENT (OUTPATIENT)
Dept: OUTPATIENT SERVICES | Facility: HOSPITAL | Age: 37
LOS: 1 days | Discharge: HOME | End: 2022-02-18

## 2022-02-21 ENCOUNTER — EMERGENCY (EMERGENCY)
Facility: HOSPITAL | Age: 37
LOS: 0 days | Discharge: HOME | End: 2022-02-21
Attending: EMERGENCY MEDICINE | Admitting: EMERGENCY MEDICINE
Payer: MEDICAID

## 2022-02-21 VITALS
HEART RATE: 90 BPM | TEMPERATURE: 98 F | OXYGEN SATURATION: 98 % | DIASTOLIC BLOOD PRESSURE: 68 MMHG | SYSTOLIC BLOOD PRESSURE: 124 MMHG | WEIGHT: 160.94 LBS | RESPIRATION RATE: 18 BRPM | HEIGHT: 65 IN

## 2022-02-21 VITALS
HEART RATE: 87 BPM | RESPIRATION RATE: 18 BRPM | SYSTOLIC BLOOD PRESSURE: 140 MMHG | DIASTOLIC BLOOD PRESSURE: 67 MMHG | OXYGEN SATURATION: 100 %

## 2022-02-21 DIAGNOSIS — R05.9 COUGH, UNSPECIFIED: ICD-10-CM

## 2022-02-21 DIAGNOSIS — R07.9 CHEST PAIN, UNSPECIFIED: ICD-10-CM

## 2022-02-21 DIAGNOSIS — M79.10 MYALGIA, UNSPECIFIED SITE: ICD-10-CM

## 2022-02-21 DIAGNOSIS — Z79.4 LONG TERM (CURRENT) USE OF INSULIN: ICD-10-CM

## 2022-02-21 DIAGNOSIS — E11.9 TYPE 2 DIABETES MELLITUS WITHOUT COMPLICATIONS: ICD-10-CM

## 2022-02-21 DIAGNOSIS — Z88.1 ALLERGY STATUS TO OTHER ANTIBIOTIC AGENTS STATUS: ICD-10-CM

## 2022-02-21 DIAGNOSIS — R00.0 TACHYCARDIA, UNSPECIFIED: ICD-10-CM

## 2022-02-21 DIAGNOSIS — Z88.8 ALLERGY STATUS TO OTHER DRUGS, MEDICAMENTS AND BIOLOGICAL SUBSTANCES: ICD-10-CM

## 2022-02-21 DIAGNOSIS — Z79.82 LONG TERM (CURRENT) USE OF ASPIRIN: ICD-10-CM

## 2022-02-21 DIAGNOSIS — K21.9 GASTRO-ESOPHAGEAL REFLUX DISEASE WITHOUT ESOPHAGITIS: ICD-10-CM

## 2022-02-21 DIAGNOSIS — U07.1 COVID-19: ICD-10-CM

## 2022-02-21 LAB
APPEARANCE UR: ABNORMAL
BACTERIA # UR AUTO: NEGATIVE — SIGNIFICANT CHANGE UP
BILIRUB UR-MCNC: NEGATIVE — SIGNIFICANT CHANGE UP
COLOR SPEC: ABNORMAL
DIFF PNL FLD: ABNORMAL
EPI CELLS # UR: 4 /HPF — SIGNIFICANT CHANGE UP (ref 0–5)
GLUCOSE UR QL: SIGNIFICANT CHANGE UP
HYALINE CASTS # UR AUTO: 1 /LPF — SIGNIFICANT CHANGE UP (ref 0–7)
KETONES UR-MCNC: ABNORMAL
LEUKOCYTE ESTERASE UR-ACNC: NEGATIVE — SIGNIFICANT CHANGE UP
NITRITE UR-MCNC: NEGATIVE — SIGNIFICANT CHANGE UP
PH UR: 6 — SIGNIFICANT CHANGE UP (ref 5–8)
PROT UR-MCNC: ABNORMAL
RBC CASTS # UR COMP ASSIST: 25 /HPF — HIGH (ref 0–4)
SARS-COV-2 RNA SPEC QL NAA+PROBE: DETECTED
SP GR SPEC: 1.03 — SIGNIFICANT CHANGE UP (ref 1.01–1.03)
UROBILINOGEN FLD QL: SIGNIFICANT CHANGE UP
WBC UR QL: 3 /HPF — SIGNIFICANT CHANGE UP (ref 0–5)

## 2022-02-21 PROCEDURE — 71045 X-RAY EXAM CHEST 1 VIEW: CPT | Mod: 26

## 2022-02-21 PROCEDURE — 99284 EMERGENCY DEPT VISIT MOD MDM: CPT

## 2022-02-21 PROCEDURE — 93010 ELECTROCARDIOGRAM REPORT: CPT

## 2022-02-21 RX ORDER — IBUPROFEN 200 MG
600 TABLET ORAL ONCE
Refills: 0 | Status: COMPLETED | OUTPATIENT
Start: 2022-02-21 | End: 2022-02-21

## 2022-02-21 RX ORDER — DEXAMETHASONE 0.5 MG/5ML
10 ELIXIR ORAL ONCE
Refills: 0 | Status: COMPLETED | OUTPATIENT
Start: 2022-02-21 | End: 2022-02-21

## 2022-02-21 RX ADMIN — Medication 600 MILLIGRAM(S): at 11:23

## 2022-02-21 RX ADMIN — Medication 600 MILLIGRAM(S): at 12:30

## 2022-02-21 RX ADMIN — Medication 10 MILLIGRAM(S): at 12:51

## 2022-02-21 NOTE — ED PROVIDER NOTE - ATTENDING CONTRIBUTION TO CARE
egenerative disc disease, thoracic, Gastroesophageal reflux disease, Intractable headache, major depressive disorder with single episode, Neuropathy, spinal stenosis, Tachycardia, Tremor of right hand, Type 1 diabetes   and urinary tract infection, recurrent 35 y/o f w/ pmhx of degenerative disc disease, thoracic, Gastroesophageal reflux disease, Intractable headache, major depressive disorder with single episode, Neuropathy, spinal stenosis, Tachycardia, Tremor of right hand, Type 1 diabetes, urinary tract infection, recurrent, syncope with recent work up that was negative presents for dry cough, body aches, and patient reports had dysuria but that subsided, for the past 2 days.  Reports her mom and dad are sick at home, mom was recently tested for Covid and was negative, patient and dad had Covid last month.  Patient does states she had a recent stress test and further work-up for her syncope that was all negative, last menstrual.–Patient currently menstruating. pt denies sore throat, states that dry cough irritates her throat at times and also causes her to feel chest tightness when she coughs. No fever, chills, n/v,  sob, pleuritic cp, palpitations, diaphoresis, ear pain,, neck pain/stiffness, abd pain, diarrhea, constipation, urinary symptoms, trauma, ha/lh/dizziness, numbness/tingling, sick contacts, recent travel, or rash. Denies smoking or etoh abuse.     on exam: WDWN appearing non toxic appearing pt sitting on stretcher in nad, speaking full sentences, no rash, mmm, no erythema, exudates, or petchiae, no rhinorrhea, TM's visualzied b/l with good cone of light, no erythema or effusions, regular rate, radial pulses 2/4 b/l, no jvd, ctabl w/ breath sounds present b/l, no wheezing or crackles,  no accessory muscle use, no tachypnea, no stridor, bs present throughout all 4 quadrants, abd soft, nd, nt, no rebound tenderness or guarding, no cvat, no pelvic pain to palpation, no suprapubic pain to palpation, FROM of ext, no edema, no calf pain/swelling/erythema, AAOx3.  motor 5/5 and sensation intact throughout upper and lower ext. No focal deficits.

## 2022-02-21 NOTE — ED ADULT NURSE NOTE - NSIMPLEMENTINTERV_GEN_ALL_ED
Implemented All Universal Safety Interventions:  [place] to call system. Call bell, personal items and telephone within reach. Instruct patient to call for assistance. Room bathroom lighting operational. Non-slip footwear when patient is off stretcher. Physically safe environment: no spills, clutter or unnecessary equipment. Stretcher in lowest position, wheels locked, appropriate side rails in place. Implemented All Fall with Harm Risk Interventions:  [place] to call system. Call bell, personal items and telephone within reach. Instruct patient to call for assistance. Room bathroom lighting operational. Non-slip footwear when patient is off stretcher. Physically safe environment: no spills, clutter or unnecessary equipment. Stretcher in lowest position, wheels locked, appropriate side rails in place. Provide visual cue, wrist band, yellow gown, etc. Monitor gait and stability. Monitor for mental status changes and reorient to person, place, and time. Review medications for side effects contributing to fall risk. Reinforce activity limits and safety measures with patient and family. Provide visual clues: red socks.

## 2022-02-21 NOTE — ED PROVIDER NOTE - NS ED ROS FT
Constitutional: (-) fever  Eyes/ENT: (-) blurry vision, (-) epistaxis  Cardiovascular: (+) chest pain, (-) syncope  Respiratory: (+) cough, (-) shortness of breath  Gastrointestinal: (-) vomiting, (-) diarrhea  Musculoskeletal: (+) generalized myalgias   Integumentary: (-) rash, (-) edema  Neurological: (-) headache, (-) altered mental status  Psychiatric: (-) hallucinations  Allergic/Immunologic: (-) pruritus

## 2022-02-21 NOTE — ED ADULT NURSE NOTE - OBJECTIVE STATEMENT
pt c/o sore throat, cough, body aches x 2 days pt c/o sore throat, cough, body aches x 2 days, denies dizziness

## 2022-02-21 NOTE — ED PROVIDER NOTE - CARE PLAN
1 Principal Discharge DX:	Viral illness   Principal Discharge DX:	Viral illness  Assessment and plan of treatment:	Plan: EKG, u preg, urine, cxr, decadron, covid swab, reassess.

## 2022-02-21 NOTE — ED PROVIDER NOTE - OBJECTIVE STATEMENT
The pt is a 36y F w/ hx of DM, GERD, tachycardia on metoprolol presenting with cough, chest pain, generalized myalgias, dysuria x 2 days. Afebrile. Mom and dad at home are sick. Denies SOB, N/V, abdominal pain, diarrhea.

## 2022-02-21 NOTE — ED PROVIDER NOTE - PROGRESS NOTE DETAILS
Pt advised to drink lots of fluids, Tylenol/Motrin for symptoms at home. Aware that they will call for any positive swab results. F/u with PMD. ED Attending Estevan, S  Patient aware of all results, understands symptomatic treatment, signs and symptoms to her forearm for, will follow up with PMD.  Tolerated p.o. in ED, ambulatory in ED, saturation 100, heart rates in 60s, copy of results provided.

## 2022-02-21 NOTE — ED PROVIDER NOTE - CLINICAL SUMMARY MEDICAL DECISION MAKING FREE TEXT BOX
37 yo female hx of dm, gerd, syncope recently worked up with neg stress one month ago/neg echo presenting with cough, mild chest pain, sore throat for the past 2 days with sick contacts at home with similar symptoms (all tested covid negative). Pt had covid last month, covid unvaccinated. Exam as noted. Pt non toxic-appearing, no respiratory distress, lungs clear b/l, oropharynx no erythema, abd soft nontender.     Plan  - ekg, cxr, ua, covid swab, reassess 37 yo female hx of dm, gerd, syncope recently worked up with neg stress one month ago/neg echo presenting with cough, mild chest pain, sore throat for the past 2 days with sick contacts at home with similar symptoms (all tested covid negative). Pt had covid last month, covid unvaccinated. Exam as noted. Pt non toxic-appearing, no respiratory distress, lungs clear b/l, oropharynx no erythema, abd soft nontender.     Plan  - ekg, cxr, ua, covid swab, reassess    Patient is a good candidate to attempt outpatient management. Supportive care and home care discussed in detail. Patient aware they may have to return for re-evaluation if outpatient treatment fails. Strict return precautions discussed. Will follow up as discussed.

## 2022-02-21 NOTE — ED ADULT NURSE NOTE - NS ED NURSE DISCH DISPOSITION
Subjective   Patient ID: Daisy is a 22 year old female who presents today for prenatal visit.  She is of 31w5d gestation.  OB History    Para Term  AB Living   1 0 0 0 0 0   SAB TAB Ectopic Molar Multiple Live Births   0 0 0 0 0 0        Patient denies any problem at present.  No bleeding, No rupture of membranes, No uterine contractions, good fetal movements.  Fetal movement explained to the patient.    Patient is known case of multiple anomalies with the fetus.  Patient had a follow-up ultrasound which confirms the diagnosis.  Patient advised to do the fetal echocardiogram for follow-up at [facility].  Considering patient's baby has a multiple problems and patient wants to do best for the baby so patient advised to transfer to the high-risk unit at [facility].  Considering patient's had a problem with the baby's heart and the baby requires immediate surgery there is a cardiothoracic surgeon at the [facility] so it is better for the baby and give good chances/more chances for survival informed to the patient.  Patient verbalized understand everything and.    Patient knows to [facility].  Patient went there for echocardiogram..  Patient advised to go back again for follow-up echocardiogram and possible transfer of the care.    ASSESSMENT:  Pregnancy at 31w5d weeks gestation.      PLAN:  Follow up visit in 1 weeks     Reviewed COVID-19 precautions:  Instructed to practice frequent hand washing and social distancing.    Discharged

## 2022-02-21 NOTE — ED PROVIDER NOTE - PATIENT PORTAL LINK FT
You can access the FollowMyHealth Patient Portal offered by [facility] by registering at the following website: [email or web address]. By joining [employer]’s FollowMyHealth portal, you will also be able to view your health information using other applications (apps) compatible with our system.

## 2022-02-23 ENCOUNTER — APPOINTMENT (OUTPATIENT)
Dept: NEUROLOGY | Facility: CLINIC | Age: 37
End: 2022-02-23
Payer: MEDICAID

## 2022-02-23 VITALS
HEART RATE: 108 BPM | HEIGHT: 64 IN | BODY MASS INDEX: 27.31 KG/M2 | WEIGHT: 160 LBS | SYSTOLIC BLOOD PRESSURE: 130 MMHG | OXYGEN SATURATION: 97 % | DIASTOLIC BLOOD PRESSURE: 76 MMHG

## 2022-02-23 LAB
CULTURE RESULTS: SIGNIFICANT CHANGE UP
SPECIMEN SOURCE: SIGNIFICANT CHANGE UP

## 2022-02-23 PROCEDURE — 99213 OFFICE O/P EST LOW 20 MIN: CPT

## 2022-02-23 RX ORDER — CYCLOBENZAPRINE HYDROCHLORIDE 10 MG/1
10 TABLET, FILM COATED ORAL
Qty: 30 | Refills: 5 | Status: DISCONTINUED | COMMUNITY
Start: 2019-08-08 | End: 2022-02-23

## 2022-02-23 RX ORDER — CYCLOBENZAPRINE HCL 5 MG
5 TABLET ORAL 3 TIMES DAILY
Refills: 0 | Status: DISCONTINUED | COMMUNITY
End: 2022-02-23

## 2022-02-23 RX ORDER — INDOMETHACIN 25 MG/1
25 CAPSULE ORAL DAILY
Qty: 30 | Refills: 1 | Status: DISCONTINUED | COMMUNITY
Start: 2019-08-09 | End: 2022-02-23

## 2022-02-23 NOTE — PHYSICAL EXAM
[FreeTextEntry1] : Neurological Exam: \par Mental status: Awake, alert and oriented x3. Recent and remote memory intact. Naming, repetition and comprehension intact. Attention/concentration intact. No dysarthria, no aphasia. Fund of knowledge appropriate. \par Cranial nerves: Pupils equally round and reactive to light, visual fields full, no nystagmus, extraocular muscles intact, V1 through V3 intact bilaterally and symmetric, face symmetric, hearing intact to finger rub, palate elevation symmetric, tongue was midline.\par Motor: MRC grading 5/5 b/l UE/LE.  strength 5/5. Normal tone and bulk. No abnormal movements. OUtward rotated ankle. \par Sensation: Reduced vibration in the toes. \par Coordination: No dysmetria on finger-to-nose and heel-to-shin. No dysdiadokinesia.\par Reflexes: 1+ in the knees and absent in the ankles \par Gait: Unsteady tandem. No ataxia. Romberg positive

## 2022-02-23 NOTE — ASSESSMENT
[FreeTextEntry1] : 35 yo female with hx of tremor, DM POLYNEUROPATHY, vulvodynia, clitoral neuropathy and headaches is here for follow up visit. Her headaches are better controlled on Elavil 75 mg qhs. Right hand tremor is not worse since she is off from Flexeril. It only gets worse when she is nervous. She was diagnosed with non epileptic seizure during her multiple admissions recently to [facility] based on VEEG. She is going to follow up with psychiatrist. \par \par # Neuropathy: \par - Repeat EMG/NCS of one arm and leg \par - Refer to rolling walker due to imbalance \par - OT already declined her request \par \par # Migraine: \par - Continue Elavil \par \par # Tremor: \par Monitor clinically \par \par # Non epileptic seizures: \par - Follow up with psychiatrist \par \par RTC in 6 months \par \par

## 2022-02-23 NOTE — HISTORY OF PRESENT ILLNESS
[FreeTextEntry1] : CRISTI MONTGOMERY is a 36 year old woman is here as a follow up visit for neuropathy, migraine and tremor. Since last visit she was admitted multiple times to [facility] and then [facility] for syncope, fall and collapse. Multiple VEEGs showed no evidence of epileptiform discharges and she was discharged with diagnosis of non epileptic seizures. She is scheduled for follow up with psychiatrist. She stopped taking Flexeril and did not notice any worsening on her right hand tremor. Headaches are better since she is back on Elavil. She reports numbness in her feet and has poor balance. Her last A1C level: 8.4.

## 2022-02-25 ENCOUNTER — APPOINTMENT (OUTPATIENT)
Dept: GASTROENTEROLOGY | Facility: CLINIC | Age: 37
End: 2022-02-25

## 2022-02-25 ENCOUNTER — APPOINTMENT (OUTPATIENT)
Dept: GASTROENTEROLOGY | Facility: CLINIC | Age: 37
End: 2022-02-25
Payer: MEDICAID

## 2022-02-25 PROCEDURE — 99443: CPT

## 2022-02-25 RX ORDER — FAMOTIDINE 40 MG/1
40 TABLET, FILM COATED ORAL TWICE DAILY
Qty: 60 | Refills: 5 | Status: DISCONTINUED | COMMUNITY
Start: 2019-08-16 | End: 2022-02-25

## 2022-02-25 NOTE — ASSESSMENT
[FreeTextEntry1] : 1) Abnormal Ct scan of [place]. Patient has had @ CT scans in the last year and MRI which was inconclusive because of motion artefact. Latest CT scan shows no liver lesion. Will not pursue work up further.\par 2) Abnormal LFTs continue to be elevated Hep serology negative Will do AMA< SETH IgG levels \par 3) Abdominal Pain Prilosec 40 mg PO Od.

## 2022-02-25 NOTE — HISTORY OF PRESENT ILLNESS
[Home] : at home, [unfilled] , at the time of the visit. [Medical Office: ([facility])___] : at the medical office located in  [Verbal consent obtained from patient] : the patient, [unfilled] [___ Month(s) Ago] : [unfilled] month(s) ago [FreeTextEntry4] : [place] [de-identified] : 12/10/21

## 2022-03-01 ENCOUNTER — APPOINTMENT (OUTPATIENT)
Dept: PSYCHIATRY | Facility: CLINIC | Age: 37
End: 2022-03-01
Payer: MEDICAID

## 2022-03-01 ENCOUNTER — OUTPATIENT (OUTPATIENT)
Dept: OUTPATIENT SERVICES | Facility: HOSPITAL | Age: 37
LOS: 1 days | Discharge: HOME | End: 2022-03-01

## 2022-03-01 DIAGNOSIS — F41.9 ANXIETY DISORDER, UNSPECIFIED: ICD-10-CM

## 2022-03-01 PROCEDURE — 99215 OFFICE O/P EST HI 40 MIN: CPT

## 2022-03-08 DIAGNOSIS — E10.65 TYPE 1 DIABETES MELLITUS WITH HYPERGLYCEMIA: ICD-10-CM

## 2022-03-09 ENCOUNTER — INPATIENT (INPATIENT)
Facility: HOSPITAL | Age: 37
LOS: 8 days | Discharge: HOME | End: 2022-03-18
Attending: INTERNAL MEDICINE | Admitting: STUDENT IN AN ORGANIZED HEALTH CARE EDUCATION/TRAINING PROGRAM
Payer: MEDICAID

## 2022-03-09 VITALS
DIASTOLIC BLOOD PRESSURE: 71 MMHG | RESPIRATION RATE: 20 BRPM | SYSTOLIC BLOOD PRESSURE: 138 MMHG | OXYGEN SATURATION: 100 % | HEART RATE: 135 BPM | WEIGHT: 160.94 LBS | TEMPERATURE: 102 F | HEIGHT: 65 IN

## 2022-03-09 DIAGNOSIS — K92.1 MELENA: ICD-10-CM

## 2022-03-09 DIAGNOSIS — E83.42 HYPOMAGNESEMIA: ICD-10-CM

## 2022-03-09 DIAGNOSIS — I10 ESSENTIAL (PRIMARY) HYPERTENSION: ICD-10-CM

## 2022-03-09 DIAGNOSIS — A08.11 ACUTE GASTROENTEROPATHY DUE TO NORWALK AGENT: ICD-10-CM

## 2022-03-09 DIAGNOSIS — F41.9 ANXIETY DISORDER, UNSPECIFIED: ICD-10-CM

## 2022-03-09 DIAGNOSIS — R56.9 UNSPECIFIED CONVULSIONS: ICD-10-CM

## 2022-03-09 DIAGNOSIS — E10.40 TYPE 1 DIABETES MELLITUS WITH DIABETIC NEUROPATHY, UNSPECIFIED: ICD-10-CM

## 2022-03-09 DIAGNOSIS — Z88.1 ALLERGY STATUS TO OTHER ANTIBIOTIC AGENTS STATUS: ICD-10-CM

## 2022-03-09 DIAGNOSIS — M54.9 DORSALGIA, UNSPECIFIED: ICD-10-CM

## 2022-03-09 DIAGNOSIS — E10.65 TYPE 1 DIABETES MELLITUS WITH HYPERGLYCEMIA: ICD-10-CM

## 2022-03-09 DIAGNOSIS — Z91.11 PATIENT'S NONCOMPLIANCE WITH DIETARY REGIMEN: ICD-10-CM

## 2022-03-09 DIAGNOSIS — K21.9 GASTRO-ESOPHAGEAL REFLUX DISEASE WITHOUT ESOPHAGITIS: ICD-10-CM

## 2022-03-09 DIAGNOSIS — G89.29 OTHER CHRONIC PAIN: ICD-10-CM

## 2022-03-09 DIAGNOSIS — K52.9 NONINFECTIVE GASTROENTERITIS AND COLITIS, UNSPECIFIED: ICD-10-CM

## 2022-03-09 DIAGNOSIS — Z96.41 PRESENCE OF INSULIN PUMP (EXTERNAL) (INTERNAL): ICD-10-CM

## 2022-03-09 DIAGNOSIS — Z88.0 ALLERGY STATUS TO PENICILLIN: ICD-10-CM

## 2022-03-09 DIAGNOSIS — R74.01 ELEVATION OF LEVELS OF LIVER TRANSAMINASE LEVELS: ICD-10-CM

## 2022-03-09 DIAGNOSIS — A41.9 SEPSIS, UNSPECIFIED ORGANISM: ICD-10-CM

## 2022-03-09 DIAGNOSIS — E86.0 DEHYDRATION: ICD-10-CM

## 2022-03-09 LAB
ALBUMIN SERPL ELPH-MCNC: 4.9 G/DL — SIGNIFICANT CHANGE UP (ref 3.5–5.2)
ALP SERPL-CCNC: 108 U/L — SIGNIFICANT CHANGE UP (ref 30–115)
ALT FLD-CCNC: 26 U/L — SIGNIFICANT CHANGE UP (ref 0–41)
ANION GAP SERPL CALC-SCNC: 16 MMOL/L — HIGH (ref 7–14)
ANISOCYTOSIS BLD QL: SLIGHT — SIGNIFICANT CHANGE UP
APPEARANCE UR: CLEAR — SIGNIFICANT CHANGE UP
AST SERPL-CCNC: 18 U/L — SIGNIFICANT CHANGE UP (ref 0–41)
B-OH-BUTYR SERPL-SCNC: 1 MMOL/L — HIGH
BASE EXCESS BLDV CALC-SCNC: -1.5 MMOL/L — SIGNIFICANT CHANGE UP (ref -2–3)
BASOPHILS # BLD AUTO: 0 K/UL — SIGNIFICANT CHANGE UP (ref 0–0.2)
BASOPHILS NFR BLD AUTO: 0 % — SIGNIFICANT CHANGE UP (ref 0–1)
BILIRUB SERPL-MCNC: 0.5 MG/DL — SIGNIFICANT CHANGE UP (ref 0.2–1.2)
BILIRUB UR-MCNC: NEGATIVE — SIGNIFICANT CHANGE UP
BUN SERPL-MCNC: 12 MG/DL — SIGNIFICANT CHANGE UP (ref 10–20)
CALCIUM SERPL-MCNC: 9.6 MG/DL — SIGNIFICANT CHANGE UP (ref 8.5–10.1)
CHLORIDE SERPL-SCNC: 94 MMOL/L — LOW (ref 98–110)
CO2 SERPL-SCNC: 22 MMOL/L — SIGNIFICANT CHANGE UP (ref 17–32)
COLOR SPEC: SIGNIFICANT CHANGE UP
CREAT SERPL-MCNC: 0.7 MG/DL — SIGNIFICANT CHANGE UP (ref 0.7–1.5)
DIFF PNL FLD: NEGATIVE — SIGNIFICANT CHANGE UP
EGFR: 115 ML/MIN/1.73M2 — SIGNIFICANT CHANGE UP
EOSINOPHIL # BLD AUTO: 0 K/UL — SIGNIFICANT CHANGE UP (ref 0–0.7)
EOSINOPHIL NFR BLD AUTO: 0 % — SIGNIFICANT CHANGE UP (ref 0–8)
FLUAV AG NPH QL: SIGNIFICANT CHANGE UP
FLUBV AG NPH QL: SIGNIFICANT CHANGE UP
GIANT PLATELETS BLD QL SMEAR: PRESENT — SIGNIFICANT CHANGE UP
GLUCOSE BLDC GLUCOMTR-MCNC: 312 MG/DL — HIGH (ref 70–99)
GLUCOSE BLDC GLUCOMTR-MCNC: 316 MG/DL — HIGH (ref 70–99)
GLUCOSE SERPL-MCNC: 246 MG/DL — HIGH (ref 70–99)
GLUCOSE UR QL: ABNORMAL
HCG SERPL QL: NEGATIVE — SIGNIFICANT CHANGE UP
HCO3 BLDV-SCNC: 24 MMOL/L — SIGNIFICANT CHANGE UP (ref 22–29)
HCT VFR BLD CALC: 37.4 % — SIGNIFICANT CHANGE UP (ref 37–47)
HGB BLD-MCNC: 11.4 G/DL — LOW (ref 12–16)
HYPOCHROMIA BLD QL: SLIGHT — SIGNIFICANT CHANGE UP
KETONES UR-MCNC: ABNORMAL
LACTATE BLDV-MCNC: 2.5 MMOL/L — HIGH (ref 0.5–2)
LACTATE SERPL-SCNC: 2 MMOL/L — SIGNIFICANT CHANGE UP (ref 0.7–2)
LEUKOCYTE ESTERASE UR-ACNC: NEGATIVE — SIGNIFICANT CHANGE UP
LIDOCAIN IGE QN: 9 U/L — SIGNIFICANT CHANGE UP (ref 7–60)
LYMPHOCYTES # BLD AUTO: 0.09 K/UL — LOW (ref 1.2–3.4)
LYMPHOCYTES # BLD AUTO: 0.9 % — LOW (ref 20.5–51.1)
MAGNESIUM SERPL-MCNC: 1.4 MG/DL — LOW (ref 1.8–2.4)
MANUAL SMEAR VERIFICATION: SIGNIFICANT CHANGE UP
MCHC RBC-ENTMCNC: 24.4 PG — LOW (ref 27–31)
MCHC RBC-ENTMCNC: 30.5 G/DL — LOW (ref 32–37)
MCV RBC AUTO: 80.1 FL — LOW (ref 81–99)
MICROCYTES BLD QL: SLIGHT — SIGNIFICANT CHANGE UP
MONOCYTES # BLD AUTO: 0.09 K/UL — LOW (ref 0.1–0.6)
MONOCYTES NFR BLD AUTO: 0.9 % — LOW (ref 1.7–9.3)
NEUTROPHILS # BLD AUTO: 10.17 K/UL — HIGH (ref 1.4–6.5)
NEUTROPHILS NFR BLD AUTO: 92.2 % — HIGH (ref 42.2–75.2)
NEUTS BAND # BLD: 6 % — SIGNIFICANT CHANGE UP (ref 0–6)
NITRITE UR-MCNC: NEGATIVE — SIGNIFICANT CHANGE UP
OSMOLALITY SERPL: 284 MOS/KG — SIGNIFICANT CHANGE UP (ref 275–300)
PCO2 BLDV: 44 MMHG — HIGH (ref 39–42)
PH BLDV: 7.35 — SIGNIFICANT CHANGE UP (ref 7.32–7.43)
PH UR: 6 — SIGNIFICANT CHANGE UP (ref 5–8)
PHOSPHATE SERPL-MCNC: 2.6 MG/DL — SIGNIFICANT CHANGE UP (ref 2.1–4.9)
PLAT MORPH BLD: NORMAL — SIGNIFICANT CHANGE UP
PLATELET # BLD AUTO: 527 K/UL — HIGH (ref 130–400)
PO2 BLDV: 24 MMHG — SIGNIFICANT CHANGE UP
POLYCHROMASIA BLD QL SMEAR: SIGNIFICANT CHANGE UP
POTASSIUM SERPL-MCNC: 4.4 MMOL/L — SIGNIFICANT CHANGE UP (ref 3.5–5)
POTASSIUM SERPL-SCNC: 4.4 MMOL/L — SIGNIFICANT CHANGE UP (ref 3.5–5)
PROT SERPL-MCNC: 8.2 G/DL — HIGH (ref 6–8)
PROT UR-MCNC: NEGATIVE — SIGNIFICANT CHANGE UP
RBC # BLD: 4.67 M/UL — SIGNIFICANT CHANGE UP (ref 4.2–5.4)
RBC # FLD: 15.9 % — HIGH (ref 11.5–14.5)
RBC BLD AUTO: ABNORMAL
RSV RNA NPH QL NAA+NON-PROBE: SIGNIFICANT CHANGE UP
SAO2 % BLDV: 31.6 % — SIGNIFICANT CHANGE UP
SARS-COV-2 RNA SPEC QL NAA+PROBE: SIGNIFICANT CHANGE UP
SODIUM SERPL-SCNC: 132 MMOL/L — LOW (ref 135–146)
SP GR SPEC: 1.01 — SIGNIFICANT CHANGE UP (ref 1.01–1.03)
TROPONIN T SERPL-MCNC: <0.01 NG/ML — SIGNIFICANT CHANGE UP
UROBILINOGEN FLD QL: SIGNIFICANT CHANGE UP
WBC # BLD: 10.36 K/UL — SIGNIFICANT CHANGE UP (ref 4.8–10.8)
WBC # FLD AUTO: 10.36 K/UL — SIGNIFICANT CHANGE UP (ref 4.8–10.8)

## 2022-03-09 PROCEDURE — 93010 ELECTROCARDIOGRAM REPORT: CPT | Mod: 77

## 2022-03-09 PROCEDURE — 74177 CT ABD & PELVIS W/CONTRAST: CPT | Mod: 26,MA

## 2022-03-09 PROCEDURE — 93010 ELECTROCARDIOGRAM REPORT: CPT

## 2022-03-09 PROCEDURE — 99285 EMERGENCY DEPT VISIT HI MDM: CPT

## 2022-03-09 PROCEDURE — 99223 1ST HOSP IP/OBS HIGH 75: CPT

## 2022-03-09 PROCEDURE — 71045 X-RAY EXAM CHEST 1 VIEW: CPT | Mod: 26

## 2022-03-09 RX ORDER — SODIUM CHLORIDE 9 MG/ML
1000 INJECTION, SOLUTION INTRAVENOUS
Refills: 0 | Status: DISCONTINUED | OUTPATIENT
Start: 2022-03-09 | End: 2022-03-14

## 2022-03-09 RX ORDER — ESCITALOPRAM OXALATE 10 MG/1
10 TABLET, FILM COATED ORAL DAILY
Refills: 0 | Status: DISCONTINUED | OUTPATIENT
Start: 2022-03-09 | End: 2022-03-18

## 2022-03-09 RX ORDER — METOPROLOL TARTRATE 50 MG
25 TABLET ORAL
Refills: 0 | Status: DISCONTINUED | OUTPATIENT
Start: 2022-03-09 | End: 2022-03-18

## 2022-03-09 RX ORDER — INSULIN GLARGINE 100 [IU]/ML
20 INJECTION, SOLUTION SUBCUTANEOUS AT BEDTIME
Refills: 0 | Status: DISCONTINUED | OUTPATIENT
Start: 2022-03-09 | End: 2022-03-10

## 2022-03-09 RX ORDER — CYCLOBENZAPRINE HYDROCHLORIDE 10 MG/1
1 TABLET, FILM COATED ORAL
Qty: 0 | Refills: 0 | DISCHARGE

## 2022-03-09 RX ORDER — AZTREONAM 2 G
2000 VIAL (EA) INJECTION EVERY 8 HOURS
Refills: 0 | Status: DISCONTINUED | OUTPATIENT
Start: 2022-03-09 | End: 2022-03-11

## 2022-03-09 RX ORDER — SODIUM CHLORIDE 9 MG/ML
1000 INJECTION, SOLUTION INTRAVENOUS ONCE
Refills: 0 | Status: COMPLETED | OUTPATIENT
Start: 2022-03-09 | End: 2022-03-09

## 2022-03-09 RX ORDER — SOD,AMMONIUM,POTASSIUM LACTATE
1 CREAM (GRAM) TOPICAL
Refills: 0 | Status: DISCONTINUED | OUTPATIENT
Start: 2022-03-09 | End: 2022-03-18

## 2022-03-09 RX ORDER — INSULIN LISPRO 100/ML
VIAL (ML) SUBCUTANEOUS
Refills: 0 | Status: DISCONTINUED | OUTPATIENT
Start: 2022-03-09 | End: 2022-03-18

## 2022-03-09 RX ORDER — AMITRIPTYLINE HCL 25 MG
75 TABLET ORAL AT BEDTIME
Refills: 0 | Status: DISCONTINUED | OUTPATIENT
Start: 2022-03-09 | End: 2022-03-18

## 2022-03-09 RX ORDER — SENNA PLUS 8.6 MG/1
2 TABLET ORAL AT BEDTIME
Refills: 0 | Status: DISCONTINUED | OUTPATIENT
Start: 2022-03-09 | End: 2022-03-18

## 2022-03-09 RX ORDER — GLUCAGON INJECTION, SOLUTION 0.5 MG/.1ML
1 INJECTION, SOLUTION SUBCUTANEOUS ONCE
Refills: 0 | Status: DISCONTINUED | OUTPATIENT
Start: 2022-03-09 | End: 2022-03-14

## 2022-03-09 RX ORDER — METRONIDAZOLE 500 MG
500 TABLET ORAL EVERY 8 HOURS
Refills: 0 | Status: DISCONTINUED | OUTPATIENT
Start: 2022-03-09 | End: 2022-03-11

## 2022-03-09 RX ORDER — PANTOPRAZOLE SODIUM 20 MG/1
40 TABLET, DELAYED RELEASE ORAL
Refills: 0 | Status: DISCONTINUED | OUTPATIENT
Start: 2022-03-09 | End: 2022-03-18

## 2022-03-09 RX ORDER — ONDANSETRON 8 MG/1
4 TABLET, FILM COATED ORAL ONCE
Refills: 0 | Status: COMPLETED | OUTPATIENT
Start: 2022-03-09 | End: 2022-03-09

## 2022-03-09 RX ORDER — FAMOTIDINE 10 MG/ML
20 INJECTION INTRAVENOUS ONCE
Refills: 0 | Status: COMPLETED | OUTPATIENT
Start: 2022-03-09 | End: 2022-03-09

## 2022-03-09 RX ORDER — KETOROLAC TROMETHAMINE 30 MG/ML
15 SYRINGE (ML) INJECTION ONCE
Refills: 0 | Status: DISCONTINUED | OUTPATIENT
Start: 2022-03-09 | End: 2022-03-09

## 2022-03-09 RX ORDER — METRONIDAZOLE 500 MG
500 TABLET ORAL ONCE
Refills: 0 | Status: COMPLETED | OUTPATIENT
Start: 2022-03-09 | End: 2022-03-09

## 2022-03-09 RX ORDER — INSULIN LISPRO 100/ML
VIAL (ML) SUBCUTANEOUS AT BEDTIME
Refills: 0 | Status: DISCONTINUED | OUTPATIENT
Start: 2022-03-09 | End: 2022-03-09

## 2022-03-09 RX ORDER — INSULIN LISPRO 100/ML
5 VIAL (ML) SUBCUTANEOUS
Refills: 0 | Status: DISCONTINUED | OUTPATIENT
Start: 2022-03-09 | End: 2022-03-10

## 2022-03-09 RX ORDER — DEXTROSE 50 % IN WATER 50 %
25 SYRINGE (ML) INTRAVENOUS ONCE
Refills: 0 | Status: DISCONTINUED | OUTPATIENT
Start: 2022-03-09 | End: 2022-03-14

## 2022-03-09 RX ORDER — ACETAMINOPHEN 500 MG
650 TABLET ORAL ONCE
Refills: 0 | Status: COMPLETED | OUTPATIENT
Start: 2022-03-09 | End: 2022-03-09

## 2022-03-09 RX ORDER — CEFEPIME 1 G/1
2000 INJECTION, POWDER, FOR SOLUTION INTRAMUSCULAR; INTRAVENOUS ONCE
Refills: 0 | Status: DISCONTINUED | OUTPATIENT
Start: 2022-03-09 | End: 2022-03-09

## 2022-03-09 RX ORDER — ASPIRIN/CALCIUM CARB/MAGNESIUM 324 MG
81 TABLET ORAL DAILY
Refills: 0 | Status: DISCONTINUED | OUTPATIENT
Start: 2022-03-09 | End: 2022-03-18

## 2022-03-09 RX ORDER — DEXTROSE 50 % IN WATER 50 %
12.5 SYRINGE (ML) INTRAVENOUS ONCE
Refills: 0 | Status: DISCONTINUED | OUTPATIENT
Start: 2022-03-09 | End: 2022-03-14

## 2022-03-09 RX ORDER — MAGNESIUM SULFATE 500 MG/ML
2 VIAL (ML) INJECTION ONCE
Refills: 0 | Status: COMPLETED | OUTPATIENT
Start: 2022-03-09 | End: 2022-03-09

## 2022-03-09 RX ORDER — AZTREONAM 2 G
2000 VIAL (EA) INJECTION ONCE
Refills: 0 | Status: COMPLETED | OUTPATIENT
Start: 2022-03-09 | End: 2022-03-09

## 2022-03-09 RX ORDER — ENOXAPARIN SODIUM 100 MG/ML
40 INJECTION SUBCUTANEOUS EVERY 24 HOURS
Refills: 0 | Status: DISCONTINUED | OUTPATIENT
Start: 2022-03-09 | End: 2022-03-18

## 2022-03-09 RX ORDER — DEXTROSE 50 % IN WATER 50 %
15 SYRINGE (ML) INTRAVENOUS ONCE
Refills: 0 | Status: DISCONTINUED | OUTPATIENT
Start: 2022-03-09 | End: 2022-03-14

## 2022-03-09 RX ADMIN — SODIUM CHLORIDE 100 MILLILITER(S): 9 INJECTION, SOLUTION INTRAVENOUS at 18:05

## 2022-03-09 RX ADMIN — SODIUM CHLORIDE 1000 MILLILITER(S): 9 INJECTION, SOLUTION INTRAVENOUS at 11:47

## 2022-03-09 RX ADMIN — SODIUM CHLORIDE 1000 MILLILITER(S): 9 INJECTION, SOLUTION INTRAVENOUS at 14:01

## 2022-03-09 RX ADMIN — FAMOTIDINE 104 MILLIGRAM(S): 10 INJECTION INTRAVENOUS at 11:58

## 2022-03-09 RX ADMIN — Medication 100 MILLIGRAM(S): at 17:46

## 2022-03-09 RX ADMIN — SENNA PLUS 2 TABLET(S): 8.6 TABLET ORAL at 22:55

## 2022-03-09 RX ADMIN — INSULIN GLARGINE 20 UNIT(S): 100 INJECTION, SOLUTION SUBCUTANEOUS at 22:21

## 2022-03-09 RX ADMIN — Medication 25 GRAM(S): at 13:57

## 2022-03-09 RX ADMIN — Medication 25 MILLIGRAM(S): at 18:06

## 2022-03-09 RX ADMIN — SODIUM CHLORIDE 1000 MILLILITER(S): 9 INJECTION, SOLUTION INTRAVENOUS at 14:02

## 2022-03-09 RX ADMIN — Medication 650 MILLIGRAM(S): at 14:01

## 2022-03-09 RX ADMIN — FAMOTIDINE 20 MILLIGRAM(S): 10 INJECTION INTRAVENOUS at 14:01

## 2022-03-09 RX ADMIN — Medication 75 MILLIGRAM(S): at 22:22

## 2022-03-09 RX ADMIN — Medication 650 MILLIGRAM(S): at 11:47

## 2022-03-09 RX ADMIN — Medication 100 MILLIGRAM(S): at 16:39

## 2022-03-09 RX ADMIN — SODIUM CHLORIDE 1000 MILLILITER(S): 9 INJECTION, SOLUTION INTRAVENOUS at 11:58

## 2022-03-09 RX ADMIN — ONDANSETRON 4 MILLIGRAM(S): 8 TABLET, FILM COATED ORAL at 11:58

## 2022-03-09 RX ADMIN — Medication 4: at 18:04

## 2022-03-09 RX ADMIN — ENOXAPARIN SODIUM 40 MILLIGRAM(S): 100 INJECTION SUBCUTANEOUS at 18:05

## 2022-03-09 RX ADMIN — Medication 100 MILLIGRAM(S): at 22:20

## 2022-03-09 NOTE — ED PROVIDER NOTE - PHYSICAL EXAMINATION
CONSTITUTIONAL: Well-appearing; well-nourished; in no apparent distress.   EYES: PERRL; EOM intact.   ENT: Normal pharynx.   CARDIOVASCULAR: Tachycardia. Normal S1, S2; no murmurs, rubs, or gallops.   RESPIRATORY: Normal chest excursion with respiration; breath sounds clear and equal bilaterally; no wheezes, rhonchi, or rales.  GI/: Right lower quadrant tenderness; Normal bowel sounds; non-distended; no palpable organomegaly. Rectal: Brown stool   MS: No calf swelling and tenderness. No pitting edema to b/l LE.   SKIN: Normal for age and race; warm; dry; good turgor; no apparent lesions or exudate.   NEURO/PSYCH: A & O x 4; grossly unremarkable.

## 2022-03-09 NOTE — PROVIDER CONTACT NOTE (OTHER) - RECOMMENDATIONS
RN asked MD whether she would like to give the pt any additional insulin on top of the 20 units Lantus

## 2022-03-09 NOTE — ED ADULT NURSE NOTE - NSIMPLEMENTINTERV_GEN_ALL_ED
Implemented All Fall with Harm Risk Interventions:  [place] to call system. Call bell, personal items and telephone within reach. Instruct patient to call for assistance. Room bathroom lighting operational. Non-slip footwear when patient is off stretcher. Physically safe environment: no spills, clutter or unnecessary equipment. Stretcher in lowest position, wheels locked, appropriate side rails in place. Provide visual cue, wrist band, yellow gown, etc. Monitor gait and stability. Monitor for mental status changes and reorient to person, place, and time. Review medications for side effects contributing to fall risk. Reinforce activity limits and safety measures with patient and family. Provide visual clues: red socks.

## 2022-03-09 NOTE — H&P ADULT - ASSESSMENT
This is a 36 year old female with PMHx of Type 1 Diabetes on insulin pump, Diabetic Neuropathy, sinus tachycardia, Anxiety with syncope, pseudoseizures, HTN, Chronic back pain, Migraines who presented to the ED with one day history of NBNB emesis and diarrhea (non bloody).     #Fevers, Abdominal pain, Nausea, Vomiting, Diarrhea  #Suspected Colitis   - Admit to medicine  - CT results noted  - Given 2L in the ED; Will continue on LR at 100cc/hour  - Continue on Aztreonam and Flagyl given antibiotic intolerances this appears to be the best regimen for her    - Stool studies ordered: GI PCR, C. Diff, Lactoferrin, Calprotectin  - Will likely need colonoscopy in 2-3 months following discharge    #Type 1 Diabetes with Diabetic Neuropathy  - Insulin pump removed by patient   - Endo; Dr. Galvez consulted   - Start on Lantus 20 and Sliding Scale  - Check a1c  - Adjust insulin orders as needed    #Anxiety  #Pseudoseizures  - Continue on home meds: lexapro 10mg PO daily, Amitriptyline 75mg PO qHS    #Sinus Tachycardia  - Patient states this a known issue and normally has HR up to 130s.   - Continue on Metoprolol 25mg PO BID     #GERD  - Continue on Protonix    Activity: As tolerated   Diet: Clear Liquid  DVT ppx: Lovenox  GI ppx: Protonix  Code Status: Full Code  DISPO: Acute     Plan discussed with hospitalist [phone] This is a 36 year old female with PMHx of Type 1 Diabetes on insulin pump, Diabetic Neuropathy, sinus tachycardia, Anxiety with syncope, pseudoseizures, HTN, Chronic back pain, Migraines who presented to the ED with one day history of NBNB emesis and diarrhea (non bloody).     # Colitis   # Sepsis on Admission   - hemodynamically stable  - CT Abdomen and Pelvis w/ IV Cont (03.09.22): Questionable cecal wall thickening, which may represent colitis.  - Given 2L in the ED; Will continue on LR at 100cc/hour  - Continue on Aztreonam and Flagyl given antibiotic intolerances  - Stool studies ordered: GI PCR, C. Diff, Lactoferrin, Calprotectin  - Will likely need colonoscopy in 2-3 months following discharge    # Type 1 Diabetes with Diabetic Neuropathy  - Insulin pump removed by patient   - Endo; Dr. Galvez consulted   - Start on Lantus 20 and Sliding Scale  - Check a1c  - Adjust insulin orders as needed    # Incidental Hepatic Hypodensities   - outpt GI and  Outpatient MRI follow-up     # Anxiety  # Pseudoseizures  - Continue on home meds: lexapro 10mg PO daily, Amitriptyline 75mg PO qHS    # GERD  - Continue on Protonix    Activity: As tolerated   Diet: Clear Liquid  DVT ppx: Lovenox  GI ppx: Protonix  Code Status: Full Code  DISPO: Acute

## 2022-03-09 NOTE — PATIENT PROFILE ADULT - FALL HARM RISK - HARM RISK INTERVENTIONS

## 2022-03-09 NOTE — H&P ADULT - NSHPPHYSICALEXAM_GEN_ALL_CORE
T(C): 37.1 (03-09-22 @ 17:29), Max: 38.8 (03-09-22 @ 11:18)  HR: 124 (03-09-22 @ 17:29) (124 - 135)  BP: 130/64 (03-09-22 @ 17:29) (126/57 - 143/63)  RR: 19 (03-09-22 @ 17:29) (17 - 20)  SpO2: 100% (03-09-22 @ 17:29) (100% - 100%)    PHYSICAL EXAM:  GENERAL: NAD, well-developed, appearing stated age   HEAD:  Atraumatic, Normocephalic  EYES: EOMI, PERRLA, conjunctiva and sclera clear  ENT:No nasal obstruction or discharge. No tonsillar exudate, swelling or erythema.  NECK: Supple, No JVD  CHEST/LUNG: Clear to auscultation bilaterally; No wheeze  HEART: tachycardic rate and regular rhythm; No murmurs, rubs, or gallops  ABDOMEN: Soft, Nontender, Nondistended; Bowel sounds present  EXTREMITIES:  2+ Peripheral Pulses, No clubbing, cyanosis, or edema  PSYCH: AAOx3  NEUROLOGY: Sensation diminished in bilateral lower extremities from knees down, Muscle strength 5/5 bilaterally upper and lower extremities, cranial nerves ii-xii grossly intact   SKIN: No rashes or lesions

## 2022-03-09 NOTE — H&P ADULT - NSHPLABSRESULTS_GEN_ALL_CORE
11.4   10.36 )-----------( 527      ( 09 Mar 2022 12:30 )             37.4       -    132<L>  |  94<L>  |  12  ----------------------------<  246<H>  4.4   |  22  |  0.7    Ca    9.6      09 Mar 2022 12:30  Phos  2.6     -  Mg     1.4         TPro  8.2<H>  /  Alb  4.9  /  TBili  0.5  /  DBili  x   /  AST  18  /  ALT  26  /  AlkPhos  108          Urinalysis Basic - ( 09 Mar 2022 13:42 )    Color: Light Yellow / Appearance: Clear / S.014 / pH: x  Gluc: x / Ketone: Large  / Bili: Negative / Urobili: <2 mg/dL   Blood: x / Protein: Negative / Nitrite: Negative   Leuk Esterase: Negative / RBC: x / WBC x   Sq Epi: x / Non Sq Epi: x / Bacteria: x    Lactate Trend   @ 12:30 Lactate:2.0     CARDIAC MARKERS ( 09 Mar 2022 12:30 )  x     / <0.01 ng/mL / x     / x     / x        CAPILLARY BLOOD GLUCOSE  POCT Blood Glucose.: 229 mg/dL (09 Mar 2022 14:14)    Culture Results:   <10,000 CFU/mL Normal Urogenital Yadira ( @ 10:10)    CT Abdomen and Pelvis w/ IV Cont (22 @ 13:50)  IMPRESSION:  Questionable cecal wall thickening, which may represent colitis.    Interval follow-up is recommended to demonstrate resolution.    Stable indeterminate hepatic hypodensities dating back to at least   abdominal pelvic CT of 2021. Outpatient MRI follow-up is   recommended when the patient is able to      12 Lead ECG (22 @ 12:05)  Ventricular Rate 121 BPM  Atrial Rate 121 BPM  P-R Interval 138 ms  QRS Duration 80 ms  Q-T Interval 334 ms  QTC Calculation(Bazett) 474 ms  P Axis 21 degrees  R Axis 61 degrees  T Axis 41 degrees    Diagnosis Line Sinus tachycardia  Possible Left atrial enlargement  Nonspecific T wave abnormality  Abnormal ECG 11.4   10.36 )-----------( 527      ( 09 Mar 2022 12:30 )             37.4       -    132<L>  |  94<L>  |  12  ----------------------------<  246<H>  4.4   |  22  |  0.7    Ca    9.6      09 Mar 2022 12:30  Phos  2.6     -  Mg     1.4         TPro  8.2<H>  /  Alb  4.9  /  TBili  0.5  /  DBili  x   /  AST  18  /  ALT  26  /  AlkPhos  108          Urinalysis Basic - ( 09 Mar 2022 13:42 )    Color: Light Yellow / Appearance: Clear / S.014 / pH: x  Gluc: x / Ketone: Large  / Bili: Negative / Urobili: <2 mg/dL   Blood: x / Protein: Negative / Nitrite: Negative       Leuk Esterase: Negative / RBC: x / WBC x   Sq Epi: x / Non Sq Epi: x / Bacteria: x    Lactate Trend   @ 12:30 Lactate:2.0     CARDIAC MARKERS ( 09 Mar 2022 12:30 )  x     / <0.01 ng/mL / x     / x     / x        CAPILLARY BLOOD GLUCOSE  POCT Blood Glucose.: 229 mg/dL (09 Mar 2022 14:14)    Culture Results:   <10,000 CFU/mL Normal Urogenital Yadira ( @ 10:10)    CT Abdomen and Pelvis w/ IV Cont (22 @ 13:50)  IMPRESSION:  Questionable cecal wall thickening, which may represent colitis.    Interval follow-up is recommended to demonstrate resolution.    Stable indeterminate hepatic hypodensities dating back to at least   abdominal pelvic CT of 2021. Outpatient MRI follow-up is   recommended when the patient is able to      12 Lead ECG (22 @ 12:05)  Ventricular Rate 121 BPM  Atrial Rate 121 BPM  P-R Interval 138 ms  QRS Duration 80 ms  Q-T Interval 334 ms  QTC Calculation(Bazett) 474 ms  P Axis 21 degrees  R Axis 61 degrees  T Axis 41 degrees    Diagnosis Line Sinus tachycardia  Possible Left atrial enlargement  Nonspecific T wave abnormality  Abnormal ECG

## 2022-03-09 NOTE — ED PROVIDER NOTE - NS ED ROS FT
Constitutional: See HPI  Eyes: no redness/discharge/pain/vision changes  ENT: no rhinorrhea/ear pain/sore throat  Cardiac: No chest pain, SOB or edema.  Respiratory: + cough No respiratory distress  GI: See HPI  : No dysuria, frequency, urgency or hematuria  MS: no pain to back or extremities, no loss of ROM, no weakness  Neuro: No headache or weakness. No LOC.  Skin: No skin rash.  Endocrine: No history of thyroid disease or diabetes.

## 2022-03-09 NOTE — ED ADULT NURSE NOTE - OBJECTIVE STATEMENT
brought in from adult facility for nausea and vomiting since last night, per pt she checked her fs and it was 450. has an insulin pump. per pt she dipped her urine and found to have a lot of ketones. also endorses bleeding from her rectum, denies any rectal pain. per pt she has an 2 episodes of syncope and fell twice

## 2022-03-09 NOTE — ED ADULT TRIAGE NOTE - BP NONINVASIVE SYSTOLIC (MM HG)
RN called and spoke to mom, RN had some follow up from the e-advice messages sent by Urszula. Mom stated that Urszula is aware at college that asked that RN call Urszula directly and ask her the follow up questions.     RN called and spoke to Urszula. Per Urszula, she not had any other medication changes recently. She currently is taking Concerta 36mg and Methylphenidate 5mg in the morning around 1000 and Methylphenidate 5mg around [id number] in the afternoon. She said especially in the afternoon, the medication has only helped her stay awake and alert for about an hour after taking it then she feels sleepy around 1700. If she can, she will take a nap at that time. She also said that in the morning she has not been feeling as alert as she used too but it is not as bad as it is in the afternoon.     Urszula states her feelings of anxiety are \"better\". She said she did not see a therapist as she wanted to see how she felt when she went away to college and now that she is at college she does not feel anxious.     She is asking if her Methylphenidate dose can be increased, particularly the afternoon dose.     RN will speak with Dr. Luciano and get back to Urszula with the plan.     Patient verbalized understanding and is in agreement with the plan. Patient knows to call/message with any further questions or concerns.    138

## 2022-03-09 NOTE — H&P ADULT - ATTENDING COMMENTS
Agree with resident's Assessment and Plan  Pt seen on 03/10/22 36 year old non compliant (to diet) female with Hx of Type 1 Diabetes on insulin pump, Diabetic Neuropathy, sinus tachycardia, Anxiety with syncope, pseudoseizures, HTN, Chronic back pain, Migraines admitted for diffuse abdominal pain associated with NBNB emesis and diarrhea. Patient also states having BM with fresh blood yesterday.    GENERAL: obese  HEAD:  Atraumatic, Normocephalic  EYES: EOMI, Sclera White   NECK: Supple, No JVD  CHEST/LUNG: clear b/l breath sounds; No wheezing, rhonchi, or crackles  HEART: Regular rate and rhythm; s1, s2, No murmurs, rubs, or gallops  ABDOMEN: Soft, diffuse abd tenderness, Nondistended; Bowel sounds present, No rebound or guarding noted   EXTREMITIES:  No lower extremity edema or calf tenderness to palpation.  No clubbing or cyanosis  PSYCH: AAOx3  NEUROLOGY: CN intact, 5/5 strength in all extremities, Sensation grossly intact.   SKIN: No rashes or lesions      # Acute Colitis   # Hematochezia  # Sepsis on Admission   - hemodynamically stable  - CT Abdomen and Pelvis w/ IV Cont (03.09.22): Questionable cecal wall thickening, which may represent colitis.  - pt never had EGD or colonoscopy   - Clear liquid diet  - c/w IVF  - c/w abx  - f/u stool studies  - GI consult     # Type 1 Diabetes with elevated blood glucose  # Diabetic Neuropathy  - pt not complaint with diet, drinks soda, ice cream   - Insulin pump removed by patient   - monitor FS  - SS insulin   - Endo consult     # Incidental Hepatic Hypodensities   - outpt GI and  Outpatient MRI follow-up 36 year old non compliant (to diet) female with Hx of Type 1 Diabetes on insulin pump, Diabetic Neuropathy, sinus tachycardia, Anxiety with syncope, pseudoseizures, HTN, Chronic back pain, Migraines admitted for diffuse abdominal pain associated with NBNB emesis and diarrhea. Patient also states having BM with fresh blood yesterday.    GENERAL: obese  HEAD:  Atraumatic, Normocephalic  EYES: EOMI, Sclera White   NECK: Supple, No JVD  CHEST/LUNG: clear b/l breath sounds; No wheezing, rhonchi, or crackles  HEART: Regular rate and rhythm; s1, s2, No murmurs, rubs, or gallops  ABDOMEN: Soft, diffuse abd tenderness, Nondistended; Bowel sounds present, No rebound or guarding noted   EXTREMITIES:  No lower extremity edema or calf tenderness to palpation.  No clubbing or cyanosis  PSYCH: AAOx3  NEUROLOGY: CN intact, 5/5 strength in all extremities, Sensation grossly intact.   SKIN: No rashes or lesions    # Acute Colitis   # Hematochezia  # Sepsis on Admission   - hemodynamically stable  - CT Abdomen and Pelvis w/ IV Cont (03.09.22): Questionable cecal wall thickening, which may represent colitis.  - pt never had EGD or colonoscopy   - Clear liquid diet  - c/w IVF  - c/w abx  - f/u stool studies  - GI consult     # Type 1 Diabetes with elevated blood glucose  # Diabetic Neuropathy  - pt not complaint with diet, drinks soda, ice cream   - Insulin pump removed by patient   - monitor FS  - SS insulin   - Endo consult     # Incidental Hepatic Hypodensities   - outpt GI and  Outpatient MRI follow-up    **Pt seen on 03/09/22

## 2022-03-09 NOTE — H&P ADULT - NSHPREVIEWOFSYSTEMS_GEN_ALL_CORE
General: No fevers, chills, weight changes	  Skin/Breast: No skin rashes or wounds  Ophthalmologic: No blurry vision, double vision, recent changes in vision  ENMT: No difficulty hearing, ringing in ears, nasal discharge, throat pain, difficulty swallowing  Respiratory and Thorax: No coughing, wheezing, shortness of breath  Cardiovascular: No chest pain, palpitations  Gastrointestinal: + abdominal pain, diarrhea, nausea, vomiting; No constipation   Genitourinary: No dysuria, polyuria, pyuria, hematuria  Musculoskeletal: No muscle aches or joint aches  Neurological: No numbness or tingling  Psychiatric: Regular mood  Hematology/Lymphatics: No easy bruising	  Endocrine: No hot or cold intolerance

## 2022-03-09 NOTE — ED PROVIDER NOTE - ATTENDING CONTRIBUTION TO CARE
36-year-old female with a past medical history significant for diabetes, GERD depression who presents with vomiting and diarrhea.  Patient states that since yesterday she has been having vomiting diarrhea and abdominal pain.  Of note patient noted that her blood sugar was above 400 and gave herself 9 units of insulin.  Patient states that she also noted ketones in her urine today while she performed a dipstick at home.  Patient denies any chest pain shortness of breath or any other complaints.    VITAL SIGNS: I have reviewed nursing notes and confirm.  CONSTITUTIONAL: non-toxic, well appearing  SKIN: no rash, no petechiae.  EYES: EOMI, pink conjunctiva, anicteric  ENT: tongue midline, no exudates, MMM  NECK: Supple; no meningismus, no JVD  CARD: RRR, no murmurs, equal radial pulses bilaterally 2+  RESP: CTAB, no respiratory distress  ABD: Soft, non-tender, non-distended, no peritoneal signs, no HSM, no CVA tenderness  EXT: Normal ROM x4.    a/p  36 yr old f that presents with n/v/abd pain   -labs  -imaging  -ua  -ivf  -reassess  -dispo pending

## 2022-03-09 NOTE — H&P ADULT - HISTORY OF PRESENT ILLNESS
This is a 36 year old female with PMHx of Type 1 Diabetes on insulin pump, Diabetic Neuropathy, Anxiety with syncope, pseudoseizures, HTN, Chronic back pain, Migraines who presented to the ED  This is a 36 year old female with PMHx of Type 1 Diabetes on insulin pump, Diabetic Neuropathy, sinus tachycardia, Anxiety with syncope, pseudoseizures, HTN, Chronic back pain, Migraines who presented to the ED with one day history of NBNB emesis and diarrhea (non bloody). The patient states that it started one day ago and she had been unable to keep anything down. She notes that her insulin pump detected a finger stick over 400 and she subsequently gave herself 9 units of insulin. The patient then came to the ED.     In the ED initial vitals: /71, , T 101.8, Sat 100% on room air. Labs noted for mildly elevated Beta-hydroxy at 1.0, UA noted for ketones  in the urine and glucose over 1000. VBG pH 7.35 with lactate over 2.5. CT concerning for colitis. Given antibiotics and admitted to medicine.     [facility] rec reviewed with the patient who has an active list of her medications and is of sound mind.

## 2022-03-09 NOTE — ED PROVIDER NOTE - OBJECTIVE STATEMENT
36-year-old female history of GERD, depressions, type 1 diabetes BIBA from adult home secondary to multiple vomiting and diarrhea associated with abdominal pain since yesterday.  Reports mother has similar symptoms.  Denies recent hospitalizations, antibiotic use and recent travel.  Patient also report fever, chills and coughing.  Also noted mild blood in stool.  Denies ear pain, sore throat, headache, chest pain, shortness of breath, rash and urinary symptoms.

## 2022-03-10 LAB
A1C WITH ESTIMATED AVERAGE GLUCOSE RESULT: 7.8 % — HIGH (ref 4–5.6)
ALBUMIN SERPL ELPH-MCNC: 4.1 G/DL — SIGNIFICANT CHANGE UP (ref 3.5–5.2)
ALP SERPL-CCNC: 89 U/L — SIGNIFICANT CHANGE UP (ref 30–115)
ALT FLD-CCNC: 25 U/L — SIGNIFICANT CHANGE UP (ref 0–41)
ANION GAP SERPL CALC-SCNC: 13 MMOL/L — SIGNIFICANT CHANGE UP (ref 7–14)
AST SERPL-CCNC: 20 U/L — SIGNIFICANT CHANGE UP (ref 0–41)
BASOPHILS # BLD AUTO: 0.04 K/UL — SIGNIFICANT CHANGE UP (ref 0–0.2)
BASOPHILS NFR BLD AUTO: 0.6 % — SIGNIFICANT CHANGE UP (ref 0–1)
BILIRUB SERPL-MCNC: <0.2 MG/DL — SIGNIFICANT CHANGE UP (ref 0.2–1.2)
BUN SERPL-MCNC: 7 MG/DL — LOW (ref 10–20)
C DIFF BY PCR RESULT: NEGATIVE — SIGNIFICANT CHANGE UP
C DIFF TOX GENS STL QL NAA+PROBE: SIGNIFICANT CHANGE UP
CALCIUM SERPL-MCNC: 9 MG/DL — SIGNIFICANT CHANGE UP (ref 8.5–10.1)
CHLORIDE SERPL-SCNC: 100 MMOL/L — SIGNIFICANT CHANGE UP (ref 98–110)
CO2 SERPL-SCNC: 21 MMOL/L — SIGNIFICANT CHANGE UP (ref 17–32)
CREAT SERPL-MCNC: 0.6 MG/DL — LOW (ref 0.7–1.5)
EGFR: 119 ML/MIN/1.73M2 — SIGNIFICANT CHANGE UP
EOSINOPHIL # BLD AUTO: 0.01 K/UL — SIGNIFICANT CHANGE UP (ref 0–0.7)
EOSINOPHIL NFR BLD AUTO: 0.2 % — SIGNIFICANT CHANGE UP (ref 0–8)
ESTIMATED AVERAGE GLUCOSE: 177 MG/DL — HIGH (ref 68–114)
GLUCOSE BLDC GLUCOMTR-MCNC: 127 MG/DL — HIGH (ref 70–99)
GLUCOSE BLDC GLUCOMTR-MCNC: 152 MG/DL — HIGH (ref 70–99)
GLUCOSE BLDC GLUCOMTR-MCNC: 205 MG/DL — HIGH (ref 70–99)
GLUCOSE BLDC GLUCOMTR-MCNC: 270 MG/DL — HIGH (ref 70–99)
GLUCOSE BLDC GLUCOMTR-MCNC: 314 MG/DL — HIGH (ref 70–99)
GLUCOSE BLDC GLUCOMTR-MCNC: 333 MG/DL — HIGH (ref 70–99)
GLUCOSE BLDC GLUCOMTR-MCNC: 353 MG/DL — HIGH (ref 70–99)
GLUCOSE SERPL-MCNC: 208 MG/DL — HIGH (ref 70–99)
HCT VFR BLD CALC: 32.8 % — LOW (ref 37–47)
HGB BLD-MCNC: 10 G/DL — LOW (ref 12–16)
IMM GRANULOCYTES NFR BLD AUTO: 0.6 % — HIGH (ref 0.1–0.3)
LYMPHOCYTES # BLD AUTO: 1.27 K/UL — SIGNIFICANT CHANGE UP (ref 1.2–3.4)
LYMPHOCYTES # BLD AUTO: 20.6 % — SIGNIFICANT CHANGE UP (ref 20.5–51.1)
MAGNESIUM SERPL-MCNC: 1.8 MG/DL — SIGNIFICANT CHANGE UP (ref 1.8–2.4)
MCHC RBC-ENTMCNC: 24.5 PG — LOW (ref 27–31)
MCHC RBC-ENTMCNC: 30.5 G/DL — LOW (ref 32–37)
MCV RBC AUTO: 80.4 FL — LOW (ref 81–99)
MONOCYTES # BLD AUTO: 0.57 K/UL — SIGNIFICANT CHANGE UP (ref 0.1–0.6)
MONOCYTES NFR BLD AUTO: 9.2 % — SIGNIFICANT CHANGE UP (ref 1.7–9.3)
NEUTROPHILS # BLD AUTO: 4.24 K/UL — SIGNIFICANT CHANGE UP (ref 1.4–6.5)
NEUTROPHILS NFR BLD AUTO: 68.8 % — SIGNIFICANT CHANGE UP (ref 42.2–75.2)
NRBC # BLD: 0 /100 WBCS — SIGNIFICANT CHANGE UP (ref 0–0)
PLATELET # BLD AUTO: 438 K/UL — HIGH (ref 130–400)
POTASSIUM SERPL-MCNC: 4.4 MMOL/L — SIGNIFICANT CHANGE UP (ref 3.5–5)
POTASSIUM SERPL-SCNC: 4.4 MMOL/L — SIGNIFICANT CHANGE UP (ref 3.5–5)
PROT SERPL-MCNC: 6.8 G/DL — SIGNIFICANT CHANGE UP (ref 6–8)
RBC # BLD: 4.08 M/UL — LOW (ref 4.2–5.4)
RBC # FLD: 15.9 % — HIGH (ref 11.5–14.5)
SODIUM SERPL-SCNC: 134 MMOL/L — LOW (ref 135–146)
WBC # BLD: 6.17 K/UL — SIGNIFICANT CHANGE UP (ref 4.8–10.8)
WBC # FLD AUTO: 6.17 K/UL — SIGNIFICANT CHANGE UP (ref 4.8–10.8)

## 2022-03-10 PROCEDURE — 99233 SBSQ HOSP IP/OBS HIGH 50: CPT

## 2022-03-10 PROCEDURE — 99254 IP/OBS CNSLTJ NEW/EST MOD 60: CPT

## 2022-03-10 PROCEDURE — 99231 SBSQ HOSP IP/OBS SF/LOW 25: CPT | Mod: GC

## 2022-03-10 PROCEDURE — 99252 IP/OBS CONSLTJ NEW/EST SF 35: CPT | Mod: GC

## 2022-03-10 RX ORDER — INSULIN LISPRO 100/ML
6 VIAL (ML) SUBCUTANEOUS
Refills: 0 | Status: DISCONTINUED | OUTPATIENT
Start: 2022-03-10 | End: 2022-03-15

## 2022-03-10 RX ORDER — INSULIN LISPRO 100/ML
8 VIAL (ML) SUBCUTANEOUS ONCE
Refills: 0 | Status: COMPLETED | OUTPATIENT
Start: 2022-03-10 | End: 2022-03-10

## 2022-03-10 RX ORDER — ACETAMINOPHEN 500 MG
650 TABLET ORAL EVERY 6 HOURS
Refills: 0 | Status: DISCONTINUED | OUTPATIENT
Start: 2022-03-10 | End: 2022-03-18

## 2022-03-10 RX ORDER — INSULIN GLARGINE 100 [IU]/ML
26 INJECTION, SOLUTION SUBCUTANEOUS AT BEDTIME
Refills: 0 | Status: DISCONTINUED | OUTPATIENT
Start: 2022-03-10 | End: 2022-03-13

## 2022-03-10 RX ADMIN — Medication 75 MILLIGRAM(S): at 21:19

## 2022-03-10 RX ADMIN — ENOXAPARIN SODIUM 40 MILLIGRAM(S): 100 INJECTION SUBCUTANEOUS at 17:53

## 2022-03-10 RX ADMIN — Medication 100 MILLIGRAM(S): at 11:51

## 2022-03-10 RX ADMIN — Medication 8 UNIT(S): at 03:48

## 2022-03-10 RX ADMIN — Medication 25 MILLIGRAM(S): at 17:52

## 2022-03-10 RX ADMIN — Medication 5 UNIT(S): at 11:52

## 2022-03-10 RX ADMIN — Medication 2: at 08:10

## 2022-03-10 RX ADMIN — Medication 100 MILLIGRAM(S): at 05:04

## 2022-03-10 RX ADMIN — ESCITALOPRAM OXALATE 10 MILLIGRAM(S): 10 TABLET, FILM COATED ORAL at 11:51

## 2022-03-10 RX ADMIN — Medication 100 MILLIGRAM(S): at 02:47

## 2022-03-10 RX ADMIN — Medication 100 MILLIGRAM(S): at 21:18

## 2022-03-10 RX ADMIN — Medication 100 MILLIGRAM(S): at 14:25

## 2022-03-10 RX ADMIN — Medication 5 UNIT(S): at 08:11

## 2022-03-10 RX ADMIN — INSULIN GLARGINE 26 UNIT(S): 100 INJECTION, SOLUTION SUBCUTANEOUS at 21:19

## 2022-03-10 RX ADMIN — Medication 81 MILLIGRAM(S): at 11:51

## 2022-03-10 RX ADMIN — Medication 25 MILLIGRAM(S): at 05:04

## 2022-03-10 RX ADMIN — Medication 1 APPLICATION(S): at 04:59

## 2022-03-10 RX ADMIN — Medication 1: at 17:52

## 2022-03-10 RX ADMIN — Medication 650 MILLIGRAM(S): at 19:47

## 2022-03-10 RX ADMIN — Medication 100 MILLIGRAM(S): at 17:05

## 2022-03-10 RX ADMIN — Medication 6 UNIT(S): at 17:53

## 2022-03-10 RX ADMIN — SENNA PLUS 2 TABLET(S): 8.6 TABLET ORAL at 21:19

## 2022-03-10 RX ADMIN — Medication 1 TABLET(S): at 11:55

## 2022-03-10 RX ADMIN — PANTOPRAZOLE SODIUM 40 MILLIGRAM(S): 20 TABLET, DELAYED RELEASE ORAL at 05:04

## 2022-03-10 NOTE — PROGRESS NOTE ADULT - SUBJECTIVE AND OBJECTIVE BOX
Progress Note:  Provider Speciality                            Hospitalist      CRISTI MONTGOMERY MRN-[id number] 36y Female     CHIEF PRESENTING COMPLAINT:  Patient is a 36y old  Female who presents with a chief complaint of Colitis (10 Mar 2022 10:13)        SUBJECTIVE:  Patient was seen and examined at bedside. Reports no active symptoms of bleeding in AM  No significant overnight events reported.     HISTORY OF PRESENTING ILLNESS:  HPI:  This is a 36 year old female with PMHx of Type 1 Diabetes on insulin pump, Diabetic Neuropathy, sinus tachycardia, Anxiety with syncope, pseudoseizures, HTN, Chronic back pain, Migraines who presented to the ED with one day history of NBNB emesis and diarrhea (non bloody). The patient states that it started one day ago and she had been unable to keep anything down. She notes that her insulin pump detected a finger stick over 400 and she subsequently gave herself 9 units of insulin. The patient then came to the ED.     In the ED initial vitals: /71, , T 101.8, Sat 100% on room air. Labs noted for mildly elevated Beta-hydroxy at 1.0, UA noted for ketones  in the urine and glucose over 1000. VBG pH 7.35 with lactate over 2.5. CT concerning for colitis. Given antibiotics and admitted to medicine.     [facility] rec reviewed with the patient who has an active list of her medications and is of sound mind.  (09 Mar 2022 17:20)        REVIEW OF SYSTEMS:  Patient denies any headache, any vision complaints, runny nose, fever, chills. Denies chest pain, shortness of breath, palpitation. Denies nausea, vomiting, abdominal pain , Denies dysuria. Denies  localized weakness in any part of the body or numbness.   At least 10 systems were reviewed in ROS. All systems reviewed  are within normal limits except for the complaints as described in Subjective.    PAST MEDICAL & SURGICAL HISTORY:  PAST MEDICAL & SURGICAL HISTORY:  Neuropathy  right lower extremity, vaginal    Type 1 diabetes    Degenerative disc disease, thoracic    Urinary tract infection, recurrent    Gastroesophageal reflux disease, esophagitis presence not specified    Intractable headache, unspecified chronicity pattern, unspecified headache type    Major depressive disorder with single episode, in full remission  previously treated with zyprexa, celexa and lexapro    Tremor of right hand    Tachycardia    Spinal stenosis    No significant past surgical history            VITAL SIGNS:  Vital Signs Last 24 Hrs  T(C): 36.6 (10 Mar 2022 08:00), Max: 37.2 (09 Mar 2022 14:00)  T(F): 97.9 (10 Mar 2022 08:00), Max: 98.9 (09 Mar 2022 14:00)  HR: 89 (10 Mar 2022 08:00) (89 - 124)  BP: 99/52 (10 Mar 2022 08:00) (99/52 - 130/64)  BP(mean): --  RR: 18 (10 Mar 2022 08:00) (17 - 19)  SpO2: 97% (10 Mar 2022 08:00) (97% - 100%)          PHYSICAL EXAMINATION:  Not in acute distress  General: No icterus  HEENT:   no JVD.  Heart: S1+S2 audible  Lungs: bilateral  moderate air entry, no wheezing, no crepitations.  Abdomen: Soft, non-tender, non-distended , no  rigidity or guarding.  CNS: Awake alert, CN  grossly intact.  Extremities:  No edema            CONSULTS:  Consultant(s) Notes Reviewed by me.   Care Discussed with Consultants/Other Providers where required.        MEDICATIONS:  MEDICATIONS  (STANDING):  amitriptyline 75 milliGRAM(s) Oral at bedtime  ammonium lactate 12% Lotion 1 Application(s) Topical two times a day  aspirin  chewable 81 milliGRAM(s) Oral daily  aztreonam  IVPB 2000 milliGRAM(s) IV Intermittent every 8 hours  dextrose 40% Gel 15 Gram(s) Oral once  dextrose 5%. 1000 milliLiter(s) (50 mL/Hr) IV Continuous <Continuous>  dextrose 5%. 1000 milliLiter(s) (100 mL/Hr) IV Continuous <Continuous>  dextrose 50% Injectable 25 Gram(s) IV Push once  dextrose 50% Injectable 12.5 Gram(s) IV Push once  dextrose 50% Injectable 25 Gram(s) IV Push once  enoxaparin Injectable 40 milliGRAM(s) SubCutaneous every 24 hours  escitalopram 10 milliGRAM(s) Oral daily  glucagon  Injectable 1 milliGRAM(s) IntraMuscular once  insulin glargine Injectable (LANTUS) 20 Unit(s) SubCutaneous at bedtime  insulin lispro (ADMELOG) corrective regimen sliding scale   SubCutaneous three times a day before meals  insulin lispro Injectable (ADMELOG) 5 Unit(s) SubCutaneous three times a day before meals  lactated ringers. 1000 milliLiter(s) (100 mL/Hr) IV Continuous <Continuous>  metoprolol tartrate 25 milliGRAM(s) Oral two times a day  metroNIDAZOLE  IVPB 500 milliGRAM(s) IV Intermittent every 8 hours  multivitamin 1 Tablet(s) Oral daily  pantoprazole    Tablet 40 milliGRAM(s) Oral before breakfast  senna 2 Tablet(s) Oral at bedtime    MEDICATIONS  (PRN):            ASSESSMENT:

## 2022-03-10 NOTE — CONSULT NOTE ADULT - ATTENDING COMMENTS
Rt sided colitis (second episode). Awaiting infectious stool studies. Discussed with the patient the importance of colonoscopy once colitis subsides, [facility] work up.

## 2022-03-10 NOTE — CONSULT NOTE ADULT - SUBJECTIVE AND OBJECTIVE BOX
HPI:  This is a 36 year old female with PMHx of Type 1 Diabetes on insulin pump, Diabetic Neuropathy, sinus tachycardia, Anxiety with syncope, pseudoseizures, HTN, Chronic back pain, Migraines who presented to the ED with one day history of NBNB emesis and diarrhea (non bloody). The patient states that it started one day ago and she had been unable to keep anything down. She notes that her insulin pump detected a finger stick over 400 and she subsequently gave herself 9 units of insulin. The patient then came to the ED.     In the ED initial vitals: /71, , T 101.8, Sat 100% on room air. Labs noted for mildly elevated Beta-hydroxy at 1.0, UA noted for ketones  in the urine and glucose over 1000. VBG pH 7.35 with lactate over 2.5. CT concerning for colitis. Given antibiotics and admitted to medicine.     [facility] reviewed with the patient who has an active list of her medications and is of sound mind.  (09 Mar 2022 17:20)      PAST MEDICAL & SURGICAL HISTORY  Neuropathy  right lower extremity, vaginal    Type 1 diabetes    Degenerative disc disease, thoracic    Urinary tract infection, recurrent    Gastroesophageal reflux disease, esophagitis presence not specified    Intractable headache, unspecified chronicity pattern, unspecified headache type    Major depressive disorder with single episode, in full remission  previously treated with zyprexa, celexa and lexapro    Tremor of right hand    Tachycardia    Spinal stenosis    No significant past surgical history      FAMILY HISTORY:  FAMILY HISTORY:  No pertinent family history in first degree relatives      SOCIAL HISTORY:  [  ] Non-smoker  [  ] Smoker  [  ] Alcohol use:  [  ] Drug use:     ALLERGIES:  Ceclor (Rash)  clindamycin (Hives; Nephrotoxicity)  penicillins (Hives)      MEDICATIONS:  MEDICATIONS  (STANDING):  amitriptyline 75 milliGRAM(s) Oral at bedtime  ammonium lactate 12% Lotion 1 Application(s) Topical two times a day  aspirin  chewable 81 milliGRAM(s) Oral daily  aztreonam  IVPB 2000 milliGRAM(s) IV Intermittent every 8 hours  dextrose 40% Gel 15 Gram(s) Oral once  dextrose 5%. 1000 milliLiter(s) (50 mL/Hr) IV Continuous <Continuous>  dextrose 5%. 1000 milliLiter(s) (100 mL/Hr) IV Continuous <Continuous>  dextrose 50% Injectable 25 Gram(s) IV Push once  dextrose 50% Injectable 12.5 Gram(s) IV Push once  dextrose 50% Injectable 25 Gram(s) IV Push once  enoxaparin Injectable 40 milliGRAM(s) SubCutaneous every 24 hours  escitalopram 10 milliGRAM(s) Oral daily  glucagon  Injectable 1 milliGRAM(s) IntraMuscular once  insulin glargine Injectable (LANTUS) 20 Unit(s) SubCutaneous at bedtime  insulin lispro (ADMELOG) corrective regimen sliding scale   SubCutaneous three times a day before meals  insulin lispro Injectable (ADMELOG) 5 Unit(s) SubCutaneous three times a day before meals  lactated ringers. 1000 milliLiter(s) (100 mL/Hr) IV Continuous <Continuous>  metoprolol tartrate 25 milliGRAM(s) Oral two times a day  metroNIDAZOLE  IVPB 500 milliGRAM(s) IV Intermittent every 8 hours  multivitamin 1 Tablet(s) Oral daily  pantoprazole    Tablet 40 milliGRAM(s) Oral before breakfast  senna 2 Tablet(s) Oral at bedtime    MEDICATIONS  (PRN):      HOME MEDICATIONS:  Home Medications:  amitriptyline 75 mg oral tablet: 1 tab(s) orally once a day (at bedtime) (09 Mar 2022 17:50)  ammonium lactate 12% topical lotion: Apply topically to affected area 2 times a day (09 Mar 2022 17:50)  aspirin 81 mg oral tablet, chewable: 1 tab(s) orally once a day (09 Mar 2022 17:50)  indomethacin 50 mg oral capsule: 1 cap(s) orally 3 times a day, As Needed (09 Mar 2022 17:50)  insulin lispro: once a day (09 Mar 2022 17:50)  Lexapro 10 mg oral tablet: 1 tab(s) orally once a day (09 Mar 2022 17:50)  metoprolol tartrate 25 mg oral tablet: 1 tab(s) orally 2 times a day (09 Mar 2022 17:50)  multivitamin: 1 tab(s) orally once a day (09 Mar 2022 17:50)      VITALS:   T(F): 98.3 (03-10 @ 00:17), Max: 101.8 (03-09 @ 11:18)  HR: 107 (03-10 @ 00:17) (89 - 135)  BP: 117/61 (03-10 @ 00:17) (117/61 - 143/63)  BP(mean): 89 (03-09 @ 11:42) (89 - 89)  RR: 18 (03-10 @ 00:17) (17 - 20)  SpO2: 100% (03-09 @ 21:23) (99% - 100%)    I&O's Summary      REVIEW OF SYSTEMS:  CONSTITUTIONAL: No weakness, fevers or chills  EYES: No visual changes  ENT: No vertigo or throat pain   NECK: No pain or stiffness  RESPIRATORY: No cough, wheezing, hemoptysis; No shortness of breath  CARDIOVASCULAR: No chest pain or palpitations  GASTROINTESTINAL: No abdominal or epigastric pain. No nausea, vomiting, or hematemesis; No diarrhea or constipation. No melena or hematochezia.  GENITOURINARY: No Polyuria  NEUROLOGICAL:  No tremors, no Weakness or numbness  SKIN: No itching, no rashes  MSK: no joint pain    PHYSICAL EXAM:  GENERAL: Patient is awake , alert and oriented,  not in acute distress  EYES: No proptosis, no lid lag  NECK: No thyroid enlargement, no palpable nodules , no bruit  LUNGS: Clear to auscultation bilaterally   CARDIOVASCULAR: S1/S2 present, RRR , no murmurs or rubs  ABD: Soft, non-tender, non-distended, +BS  EXT: No KASSANDRA  SKIN: No abdominal striae  NEURO: No tremors, DTR 2+    LABS:                        11.4   10.36 )-----------( 527      ( 09 Mar 2022 12:30 )             37.4     03-09    132<L>  |  94<L>  |  12  ----------------------------<  246<H>  4.4   |  22  |  0.7    Ca    9.6      09 Mar 2022 12:30  Phos  2.6     03-09  Mg     1.4     03-09    TPro  8.2<H>  /  Alb  4.9  /  TBili  0.5  /  DBili  x   /  AST  18  /  ALT  26  /  AlkPhos  108  03-09      Lactate, Blood: 2.0 mmol/L (03-09-22 @ 12:30)  Troponin T, Serum: <0.01 ng/mL (03-09-22 @ 12:30)    CARDIAC MARKERS ( 09 Mar 2022 12:30 )  x     / <0.01 ng/mL / x     / x     / x            POCT Blood Glucose.: 205 mg/dL (03-10-22 @ 08:01)  POCT Blood Glucose.: 314 mg/dL (03-10-22 @ 05:05)  POCT Blood Glucose.: 353 mg/dL (03-10-22 @ 02:16)  POCT Blood Glucose.: 333 mg/dL (03-09-22 @ 22:06)  POCT Blood Glucose.: 312 mg/dL (03-09-22 @ 20:13)  POCT Blood Glucose.: 316 mg/dL (03-09-22 @ 17:58)  POCT Blood Glucose.: 229 mg/dL (03-09-22 @ 14:14)  POCT Blood Glucose.: 227 mg/dL (03-09-22 @ 12:56)  POCT Blood Glucose.: 248 mg/dL (03-09-22 @ 11:20)    TSH 1.89; Total T3 --; Free T4 --   HPI:  This is a 36 year old female with PMHx of Type 1 Diabetes on insulin pump, Diabetic Neuropathy, sinus tachycardia, Anxiety with syncope, pseudoseizures, HTN, Chronic back pain, Migraines who presented to the ED with one day history of NBNB emesis and diarrhea (non bloody). The patient states that it started one day ago and she had been unable to keep anything down. She notes that her insulin pump detected a finger stick over 400 and she subsequently gave herself 9 units of insulin. The patient then came to the ED.     In the ED initial vitals: /71, , T 101.8, Sat 100% on room air. Labs noted for mildly elevated Beta-hydroxy at 1.0, UA noted for ketones  in the urine and glucose over 1000. VBG pH 7.35 with lactate over 2.5. CT concerning for colitis. Given antibiotics and admitted to medicine.     [facility] reviewed with the patient who has an active list of her medications and is of sound mind.  (09 Mar 2022 17:20)      PAST MEDICAL & SURGICAL HISTORY  Neuropathy  right lower extremity, vaginal    Type 1 diabetes    Degenerative disc disease, thoracic    Urinary tract infection, recurrent    Gastroesophageal reflux disease, esophagitis presence not specified    Intractable headache, unspecified chronicity pattern, unspecified headache type    Major depressive disorder with single episode, in full remission  previously treated with zyprexa, celexa and lexapro    Tremor of right hand    Tachycardia    Spinal stenosis    No significant past surgical history      FAMILY HISTORY:  FAMILY HISTORY:  No pertinent family history in first degree relatives      SOCIAL HISTORY:  [  ] Non-smoker  [  ] Smoker  [  ] Alcohol use:  [  ] Drug use:     ALLERGIES:  Ceclor (Rash)  clindamycin (Hives; Nephrotoxicity)  penicillins (Hives)      MEDICATIONS:  MEDICATIONS  (STANDING):  amitriptyline 75 milliGRAM(s) Oral at bedtime  ammonium lactate 12% Lotion 1 Application(s) Topical two times a day  aspirin  chewable 81 milliGRAM(s) Oral daily  aztreonam  IVPB 2000 milliGRAM(s) IV Intermittent every 8 hours  dextrose 40% Gel 15 Gram(s) Oral once  dextrose 5%. 1000 milliLiter(s) (50 mL/Hr) IV Continuous <Continuous>  dextrose 5%. 1000 milliLiter(s) (100 mL/Hr) IV Continuous <Continuous>  dextrose 50% Injectable 25 Gram(s) IV Push once  dextrose 50% Injectable 12.5 Gram(s) IV Push once  dextrose 50% Injectable 25 Gram(s) IV Push once  enoxaparin Injectable 40 milliGRAM(s) SubCutaneous every 24 hours  escitalopram 10 milliGRAM(s) Oral daily  glucagon  Injectable 1 milliGRAM(s) IntraMuscular once  insulin glargine Injectable (LANTUS) 20 Unit(s) SubCutaneous at bedtime  insulin lispro (ADMELOG) corrective regimen sliding scale   SubCutaneous three times a day before meals  insulin lispro Injectable (ADMELOG) 5 Unit(s) SubCutaneous three times a day before meals  lactated ringers. 1000 milliLiter(s) (100 mL/Hr) IV Continuous <Continuous>  metoprolol tartrate 25 milliGRAM(s) Oral two times a day  metroNIDAZOLE  IVPB 500 milliGRAM(s) IV Intermittent every 8 hours  multivitamin 1 Tablet(s) Oral daily  pantoprazole    Tablet 40 milliGRAM(s) Oral before breakfast  senna 2 Tablet(s) Oral at bedtime    MEDICATIONS  (PRN):      HOME MEDICATIONS:  Home Medications:  amitriptyline 75 mg oral tablet: 1 tab(s) orally once a day (at bedtime) (09 Mar 2022 17:50)  ammonium lactate 12% topical lotion: Apply topically to affected area 2 times a day (09 Mar 2022 17:50)  aspirin 81 mg oral tablet, chewable: 1 tab(s) orally once a day (09 Mar 2022 17:50)  indomethacin 50 mg oral capsule: 1 cap(s) orally 3 times a day, As Needed (09 Mar 2022 17:50)  insulin lispro: once a day (09 Mar 2022 17:50)  Lexapro 10 mg oral tablet: 1 tab(s) orally once a day (09 Mar 2022 17:50)  metoprolol tartrate 25 mg oral tablet: 1 tab(s) orally 2 times a day (09 Mar 2022 17:50)  multivitamin: 1 tab(s) orally once a day (09 Mar 2022 17:50)      VITALS:   T(F): 98.3 (03-10 @ 00:17), Max: 101.8 (03-09 @ 11:18)  HR: 107 (03-10 @ 00:17) (89 - 135)  BP: 117/61 (03-10 @ 00:17) (117/61 - 143/63)  BP(mean): 89 (03-09 @ 11:42) (89 - 89)  RR: 18 (03-10 @ 00:17) (17 - 20)  SpO2: 100% (03-09 @ 21:23) (99% - 100%)    I&O's Summary      REVIEW OF SYSTEMS:  CONSTITUTIONAL: No weakness, fevers or chills  EYES: occasional blurry vision   RESPIRATORY: No cough, wheezing No shortness of breath  CARDIOVASCULAR: No chest pain or palpitations  GASTROINTESTINAL: No abdominal + vomiting but improved now and diarrhea   GENITOURINARY: No Polyuria  SKIN: No itching, no rashes  MSK: no joint pain    PHYSICAL EXAM:  GENERAL: Patient is awake , alert and oriented,  not in acute distress  EYES: No proptosis, no lid lag  NECK: No thyroid enlargement  LUNGS: Clear to auscultation bilaterally   CARDIOVASCULAR: S1/S2 present, RRR , no murmurs or rubs  ABD: Soft, non-tender, non-distended, +BS, + pump site   EXT: No KASSANDRA  SKIN: No abdominal striae      LABS:                        11.4   10.36 )-----------( 527      ( 09 Mar 2022 12:30 )             37.4     03-09    132<L>  |  94<L>  |  12  ----------------------------<  246<H>  4.4   |  22  |  0.7    Ca    9.6      09 Mar 2022 12:30  Phos  2.6     03-09  Mg     1.4     03-09    TPro  8.2<H>  /  Alb  4.9  /  TBili  0.5  /  DBili  x   /  AST  18  /  ALT  26  /  AlkPhos  108  03-09      Lactate, Blood: 2.0 mmol/L (03-09-22 @ 12:30)  Troponin T, Serum: <0.01 ng/mL (03-09-22 @ 12:30)    CARDIAC MARKERS ( 09 Mar 2022 12:30 )  x     / <0.01 ng/mL / x     / x     / x            POCT Blood Glucose.: 205 mg/dL (03-10-22 @ 08:01)  POCT Blood Glucose.: 314 mg/dL (03-10-22 @ 05:05)  POCT Blood Glucose.: 353 mg/dL (03-10-22 @ 02:16)  POCT Blood Glucose.: 333 mg/dL (03-09-22 @ 22:06)  POCT Blood Glucose.: 312 mg/dL (03-09-22 @ 20:13)  POCT Blood Glucose.: 316 mg/dL (03-09-22 @ 17:58)  POCT Blood Glucose.: 229 mg/dL (03-09-22 @ 14:14)  POCT Blood Glucose.: 227 mg/dL (03-09-22 @ 12:56)  POCT Blood Glucose.: 248 mg/dL (03-09-22 @ 11:20)    TSH 1.89; Total T3 --; Free T4 --

## 2022-03-10 NOTE — PROGRESS NOTE ADULT - ASSESSMENT
36 year old female with PMHx of Type 1 Diabetes on insulin pump, Diabetic Neuropathy, sinus tachycardia, Anxiety with syncope, pseudoseizures, HTN, Chronic back pain, Migraines who presented to the ED with one day history of NBNB emesis and diarrhea (non bloody)      ASSESSMENT & PLAN:      Acute Colitis   Hematochezia  Sepsis on Admission   Hyperglycemia      C diff needs to be ruled out  Follow GI pcr  Clears for now and advance as tolerated  Continue IVF until po improves  GI noted- outpatient colonoscopy & EGD in 6-8 weeks  Hyperglycemia- optimize Lantus & lispro with sliding scale coverage( possible non-compliance with diabetic diet)  Incidental Hepatic Hypodensities -MRI as outpatient & follow with GI  Continue current medical management for active chronic comorbid conditions.  DVT Prophylaxis- lovenox s/q  Supportive care including activity, diet, goals of care discussed in AM rounds.  Discussed with  / in AM rounds  Handoff: Acute inpatient management  required further

## 2022-03-10 NOTE — CONSULT NOTE ADULT - ATTENDING COMMENTS
well known to be from outpatient and multiple recent admissions, patient with type 1 DM on insulin pump , came in with vomiting and diarrhea found to have colitis , now on IV antibiotics , ketones in urine and gap mildly elevated.     #type 1 DM   - off insulin pump now   - POC reviewed   - Increase Lantus to 26 units at bedtime   - increase lispro to 6 units TIDAC ( on clear liquid )   - Continue ISS 2:50> 150 well known to be from outpatient and multiple recent admissions, patient with type 1 DM on insulin pump , came in with vomiting and diarrhea found to have colitis , now on IV antibiotics , ketones in urine and gap mildly elevated.     #type 1 DM   - off insulin pump now   - POC reviewed   - Increase Lantus to 26 units at bedtime   - increase lispro to 6 units TIDAC ( on clear liquid )   - Continue ISS 1:50> 150  please do not hold her basal insulin as patient has type 1 DM and can go into DKA

## 2022-03-10 NOTE — CONSULT NOTE ADULT - ASSESSMENT
IMPRESSION    ·	T1DM on insulin pump at home  ·	HO DKA 12/2021    PLAN    FS remain elevated on current regimen on 20/5/5/5 + 1 sliding scale > Increase Lantus to 26 w/ Lispro 8 pre-meals and 2 sliding scale IMPRESSION    ·	T1DM on insulin pump at home  ·	HO DKA 12/2021    PLAN    FS remain elevated on current regimen on 20/5/5/5 + 1 sliding scale > Increase Lantus to 26; add Lispro 6 pre-meals and 1 sliding scale if eating

## 2022-03-10 NOTE — PROGRESS NOTE ADULT - SUBJECTIVE AND OBJECTIVE BOX
CRISTI MONTGOMERY 36y Female  MRN#: [id number]   CODE STATUS:________    Hospital Day: 1d    Pt is currently admitted with the primary diagnosis of colitis.    SUBJECTIVE  Hospital Course    This is a 36 year old female with PMHx of Type 1 Diabetes on insulin pump, Diabetic Neuropathy, sinus tachycardia, Anxiety with syncope, pseudoseizures, HTN, Chronic back pain, Migraines who presented to the ED with one day history of NBNB emesis and diarrhea (non bloody). The patient states that it started one day ago and she had been unable to keep anything down. She notes that her insulin pump detected a finger stick over 400 and she subsequently gave herself 9 units of insulin. The patient then came to the ED.     The patient was seen and examined ,alert .oriented comfortable in bed.  Reports lower abdominal pain.                                            ----------------------------------------------------------  OBJECTIVE  PAST MEDICAL & SURGICAL HISTORY  Neuropathy  right lower extremity, vaginal    Type 1 diabetes    Degenerative disc disease, thoracic    Urinary tract infection, recurrent    Gastroesophageal reflux disease, esophagitis presence not specified    Intractable headache, unspecified chronicity pattern, unspecified headache type    Major depressive disorder with single episode, in full remission  previously treated with zyprexa, celexa and lexapro    Tremor of right hand    Tachycardia    Spinal stenosis    No significant past surgical history                                              -----------------------------------------------------------  ALLERGIES:  Ceclor (Rash)  clindamycin (Hives; Nephrotoxicity)  penicillins (Hives)                                            ------------------------------------------------------------    HOME MEDICATIONS  Home Medications:  amitriptyline 75 mg oral tablet: 1 tab(s) orally once a day (at bedtime) (09 Mar 2022 17:50)  ammonium lactate 12% topical lotion: Apply topically to affected area 2 times a day (09 Mar 2022 17:50)  aspirin 81 mg oral tablet, chewable: 1 tab(s) orally once a day (09 Mar 2022 17:50)  indomethacin 50 mg oral capsule: 1 cap(s) orally 3 times a day, As Needed (09 Mar 2022 17:50)  insulin lispro: once a day (09 Mar 2022 17:50)  Lexapro 10 mg oral tablet: 1 tab(s) orally once a day (09 Mar 2022 17:50)  metoprolol tartrate 25 mg oral tablet: 1 tab(s) orally 2 times a day (09 Mar 2022 17:50)  multivitamin: 1 tab(s) orally once a day (09 Mar 2022 17:50)                           MEDICATIONS:  STANDING MEDICATIONS  amitriptyline 75 milliGRAM(s) Oral at bedtime  ammonium lactate 12% Lotion 1 Application(s) Topical two times a day  aspirin  chewable 81 milliGRAM(s) Oral daily  aztreonam  IVPB 2000 milliGRAM(s) IV Intermittent every 8 hours  dextrose 40% Gel 15 Gram(s) Oral once  dextrose 5%. 1000 milliLiter(s) IV Continuous <Continuous>  dextrose 5%. 1000 milliLiter(s) IV Continuous <Continuous>  dextrose 50% Injectable 25 Gram(s) IV Push once  dextrose 50% Injectable 12.5 Gram(s) IV Push once  dextrose 50% Injectable 25 Gram(s) IV Push once  enoxaparin Injectable 40 milliGRAM(s) SubCutaneous every 24 hours  escitalopram 10 milliGRAM(s) Oral daily  glucagon  Injectable 1 milliGRAM(s) IntraMuscular once  insulin glargine Injectable (LANTUS) 20 Unit(s) SubCutaneous at bedtime  insulin lispro (ADMELOG) corrective regimen sliding scale   SubCutaneous three times a day before meals  insulin lispro Injectable (ADMELOG) 5 Unit(s) SubCutaneous three times a day before meals  lactated ringers. 1000 milliLiter(s) IV Continuous <Continuous>  metoprolol tartrate 25 milliGRAM(s) Oral two times a day  metroNIDAZOLE  IVPB 500 milliGRAM(s) IV Intermittent every 8 hours  multivitamin 1 Tablet(s) Oral daily  pantoprazole    Tablet 40 milliGRAM(s) Oral before breakfast  senna 2 Tablet(s) Oral at bedtime    PRN MEDICATIONS                                            ------------------------------------------------------------  VITAL SIGNS: Last 24 Hours  T(C): 36.6 (10 Mar 2022 08:00), Max: 37.1 (09 Mar 2022 17:29)  T(F): 97.9 (10 Mar 2022 08:00), Max: 98.8 (09 Mar 2022 17:29)  HR: 89 (10 Mar 2022 08:00) (89 - 124)  BP: 99/52 (10 Mar 2022 08:00) (99/52 - 130/64)  BP(mean): --  RR: 18 (10 Mar 2022 08:00) (18 - 19)  SpO2: 97% (10 Mar 2022 08:00) (97% - 100%)                                             --------------------------------------------------------------  LABS:                        10.0   6.17  )-----------( 438      ( 10 Mar 2022 04:30 )             32.8     03-10    134<L>  |  100  |  7<L>  ----------------------------<  208<H>  4.4   |  21  |  0.6<L>    Ca    9.0      10 Mar 2022 04:30  Phos  2.6     03-09  Mg     1.8     03-10    TPro  6.8  /  Alb  4.1  /  TBili  <0.2  /  DBili  x   /  AST  20  /  ALT  25  /  AlkPhos  89  03-10      Urinalysis Basic - ( 09 Mar 2022 13:42 )    Color: Light Yellow / Appearance: Clear / S.014 / pH: x  Gluc: x / Ketone: Large  / Bili: Negative / Urobili: <2 mg/dL   Blood: x / Protein: Negative / Nitrite: Negative   Leuk Esterase: Negative / RBC: x / WBC x   Sq Epi: x / Non Sq Epi: x / Bacteria: x          CARDIAC MARKERS ( 09 Mar 2022 12:30 )  x     / <0.01 ng/mL / x     / x     / x                                                  -------------------------------------------------------------  RADIOLOGY:  < from: CT Abdomen and Pelvis w/ IV Cont (22 @ 13:50) >  Questionable cecal wall thickening, which may represent colitis.    Interval follow-up is recommended to demonstrate resolution.    Stable indeterminate hepatic hypodensities dating back to at least   abdominal pelvic CT of 2021. Outpatient MRI follow-up is   recommended when the patient is able to    < end of copied text >  < from: Xray Chest 1 View- PORTABLE-Urgent (22 @ 13:03) >  Impression:    No radiographic evidence of acute cardiopulmonary disease.    < end of copied text >  < from: Xray Chest 1 View-PORTABLE IMMEDIATE (Xray Chest 1 View-PORTABLE IMMEDIATE .) (22 @ 09:41) >  No radiographic evidence of acute cardiopulmonary disease.    < end of copied text >                                            --------------------------------------------------------------    PHYSICAL EXAM:  GENERAL: NAD, lying in bed comfortably  HEAD:  Atraumatic, Normocephalic  EYES: EOMI, PERRLA, conjunctiva and sclera clear  ENT: Moist mucous membranes  CHEST/LUNG: Clear to auscultation bilaterally  HEART: Regular rate and rhythm; No murmurs, rubs, or gallops  ABDOMEN: Soft, Nontender, Nondistended.   EXTREMITIES: No clubbing, cyanosis, or edema  NERVOUS SYSTEM:  Alert & Oriented X3, speech clear. No deficits   MSK: FROM all 4 extremities, full and equal strength  SKIN: No rashes or lesions                                           --------------------------------------------------------------    ASSESSMENT & PLAN    Past medical history and hospital course     This is a 36 year old female with PMHx of Type 1 Diabetes on insulin pump, Diabetic Neuropathy, sinus tachycardia, Anxiety with syncope, pseudoseizures, HTN, Chronic back pain, Migraines who presented to the ED with one day history of NBNB emesis and diarrhea (non bloody).     # Colitis   # Sepsis on Admission   - Hematochezia   - hemodynamically stable  - CT Abdomen and Pelvis w/ IV Cont (22): Questionable cecal wall thickening, which may represent colitis.  - Given 2L in the ED; Will continue on LR at 100cc/hour  - Continue on Aztreonam and Flagyl given antibiotic intolerances  - Stool studies ordered: GI PCR, C. Diff, Lactoferrin, Calprotectin  - GI consult : f/u ESR and CRP , outpatient colonoscopy in 6-8 weeks + EGD   - clear liquid diet  - f/u Bcx ,Ux         # Type 1 Diabetes with Diabetic Neuropathy  - Insulin pump removed by patient   - Endo; Dr. Galvez consulted   - Start on Lantus 20 and Sliding Scale  - a1c 7.8  - Adjust insulin orders as needed    # Incidental Hepatic Hypodensities   - outpt GI and  Outpatient MRI follow-up     # Anxiety  # Pseudoseizures  - Continue on home meds: lexapro 10mg PO daily, Amitriptyline 75mg PO qHS    # GERD  - Continue on Protonix    Activity: As tolerated   Diet: Clear Liquid  DVT ppx: Lovenox  GI ppx: Protonix  Code Status: Full Code  DISPO: Acute   Handoff : f/u stool studies ,ESR ,CRP ,Bcx ,Ux                                                                                                        ----------------------------------------------------  # DVT prophylaxis     # GI prophylaxis     # Diet     # Activity Score (AM-PAC)    # Code status     # Disposition                                                                              --------------------------------------------------------    # Handoff      CRISTI MONTGOMERY 36y Female  MRN#: [id number]   CODE STATUS:________    Hospital Day: 1d    Pt is currently admitted with the primary diagnosis of colitis.    SUBJECTIVE  Hospital Course    This is a 36 year old female with PMHx of Type 1 Diabetes on insulin pump, Diabetic Neuropathy, sinus tachycardia, Anxiety with syncope, pseudoseizures, HTN, Chronic back pain, Migraines who presented to the ED with one day history of NBNB emesis and diarrhea (non bloody). The patient states that it started one day ago and she had been unable to keep anything down. She notes that her insulin pump detected a finger stick over 400 and she subsequently gave herself 9 units of insulin. The patient then came to the ED.     The patient was seen and examined ,alert .oriented comfortable in bed.  Reports lower abdominal pain.                                            ----------------------------------------------------------  OBJECTIVE  PAST MEDICAL & SURGICAL HISTORY  Neuropathy  right lower extremity, vaginal    Type 1 diabetes    Degenerative disc disease, thoracic    Urinary tract infection, recurrent    Gastroesophageal reflux disease, esophagitis presence not specified    Intractable headache, unspecified chronicity pattern, unspecified headache type    Major depressive disorder with single episode, in full remission  previously treated with zyprexa, celexa and lexapro    Tremor of right hand    Tachycardia    Spinal stenosis    No significant past surgical history                                              -----------------------------------------------------------  ALLERGIES:  Ceclor (Rash)  clindamycin (Hives; Nephrotoxicity)  penicillins (Hives)                                            ------------------------------------------------------------    HOME MEDICATIONS  Home Medications:  amitriptyline 75 mg oral tablet: 1 tab(s) orally once a day (at bedtime) (09 Mar 2022 17:50)  ammonium lactate 12% topical lotion: Apply topically to affected area 2 times a day (09 Mar 2022 17:50)  aspirin 81 mg oral tablet, chewable: 1 tab(s) orally once a day (09 Mar 2022 17:50)  indomethacin 50 mg oral capsule: 1 cap(s) orally 3 times a day, As Needed (09 Mar 2022 17:50)  insulin lispro: once a day (09 Mar 2022 17:50)  Lexapro 10 mg oral tablet: 1 tab(s) orally once a day (09 Mar 2022 17:50)  metoprolol tartrate 25 mg oral tablet: 1 tab(s) orally 2 times a day (09 Mar 2022 17:50)  multivitamin: 1 tab(s) orally once a day (09 Mar 2022 17:50)                           MEDICATIONS:  STANDING MEDICATIONS  amitriptyline 75 milliGRAM(s) Oral at bedtime  ammonium lactate 12% Lotion 1 Application(s) Topical two times a day  aspirin  chewable 81 milliGRAM(s) Oral daily  aztreonam  IVPB 2000 milliGRAM(s) IV Intermittent every 8 hours  dextrose 40% Gel 15 Gram(s) Oral once  dextrose 5%. 1000 milliLiter(s) IV Continuous <Continuous>  dextrose 5%. 1000 milliLiter(s) IV Continuous <Continuous>  dextrose 50% Injectable 25 Gram(s) IV Push once  dextrose 50% Injectable 12.5 Gram(s) IV Push once  dextrose 50% Injectable 25 Gram(s) IV Push once  enoxaparin Injectable 40 milliGRAM(s) SubCutaneous every 24 hours  escitalopram 10 milliGRAM(s) Oral daily  glucagon  Injectable 1 milliGRAM(s) IntraMuscular once  insulin glargine Injectable (LANTUS) 20 Unit(s) SubCutaneous at bedtime  insulin lispro (ADMELOG) corrective regimen sliding scale   SubCutaneous three times a day before meals  insulin lispro Injectable (ADMELOG) 5 Unit(s) SubCutaneous three times a day before meals  lactated ringers. 1000 milliLiter(s) IV Continuous <Continuous>  metoprolol tartrate 25 milliGRAM(s) Oral two times a day  metroNIDAZOLE  IVPB 500 milliGRAM(s) IV Intermittent every 8 hours  multivitamin 1 Tablet(s) Oral daily  pantoprazole    Tablet 40 milliGRAM(s) Oral before breakfast  senna 2 Tablet(s) Oral at bedtime    PRN MEDICATIONS                                            ------------------------------------------------------------  VITAL SIGNS: Last 24 Hours  T(C): 36.6 (10 Mar 2022 08:00), Max: 37.1 (09 Mar 2022 17:29)  T(F): 97.9 (10 Mar 2022 08:00), Max: 98.8 (09 Mar 2022 17:29)  HR: 89 (10 Mar 2022 08:00) (89 - 124)  BP: 99/52 (10 Mar 2022 08:00) (99/52 - 130/64)  BP(mean): --  RR: 18 (10 Mar 2022 08:00) (18 - 19)  SpO2: 97% (10 Mar 2022 08:00) (97% - 100%)                                             --------------------------------------------------------------  LABS:                        10.0   6.17  )-----------( 438      ( 10 Mar 2022 04:30 )             32.8     03-10    134<L>  |  100  |  7<L>  ----------------------------<  208<H>  4.4   |  21  |  0.6<L>    Ca    9.0      10 Mar 2022 04:30  Phos  2.6     03-09  Mg     1.8     03-10    TPro  6.8  /  Alb  4.1  /  TBili  <0.2  /  DBili  x   /  AST  20  /  ALT  25  /  AlkPhos  89  03-10      Urinalysis Basic - ( 09 Mar 2022 13:42 )    Color: Light Yellow / Appearance: Clear / S.014 / pH: x  Gluc: x / Ketone: Large  / Bili: Negative / Urobili: <2 mg/dL   Blood: x / Protein: Negative / Nitrite: Negative   Leuk Esterase: Negative / RBC: x / WBC x   Sq Epi: x / Non Sq Epi: x / Bacteria: x          CARDIAC MARKERS ( 09 Mar 2022 12:30 )  x     / <0.01 ng/mL / x     / x     / x                                                  -------------------------------------------------------------  RADIOLOGY:  < from: CT Abdomen and Pelvis w/ IV Cont (22 @ 13:50) >  Questionable cecal wall thickening, which may represent colitis.    Interval follow-up is recommended to demonstrate resolution.    Stable indeterminate hepatic hypodensities dating back to at least   abdominal pelvic CT of 2021. Outpatient MRI follow-up is   recommended when the patient is able to    < end of copied text >  < from: Xray Chest 1 View- PORTABLE-Urgent (22 @ 13:03) >  Impression:    No radiographic evidence of acute cardiopulmonary disease.    < end of copied text >  < from: Xray Chest 1 View-PORTABLE IMMEDIATE (Xray Chest 1 View-PORTABLE IMMEDIATE .) (22 @ 09:41) >  No radiographic evidence of acute cardiopulmonary disease.    < end of copied text >                                            --------------------------------------------------------------    PHYSICAL EXAM:  GENERAL: NAD, lying in bed comfortably  HEAD:  Atraumatic, Normocephalic  EYES: EOMI, PERRLA, conjunctiva and sclera clear  ENT: Moist mucous membranes  CHEST/LUNG: Clear to auscultation bilaterally  HEART: Regular rate and rhythm; No murmurs, rubs, or gallops  ABDOMEN: Soft, Nontender, Nondistended.   EXTREMITIES: No clubbing, cyanosis, or edema  NERVOUS SYSTEM:  Alert & Oriented X3, speech clear. No deficits   MSK: FROM all 4 extremities, full and equal strength  SKIN: No rashes or lesions                                           --------------------------------------------------------------    ASSESSMENT & PLAN    Past medical history and hospital course     This is a 36 year old female with PMHx of Type 1 Diabetes on insulin pump, Diabetic Neuropathy, sinus tachycardia, Anxiety with syncope, pseudoseizures, HTN, Chronic back pain, Migraines who presented to the ED with one day history of NBNB emesis and diarrhea (non bloody).     # Colitis   # Sepsis on Admission   - Hematochezia   - hemodynamically stable  - CT Abdomen and Pelvis w/ IV Cont (22): Questionable cecal wall thickening, which may represent colitis.  - Given 2L in the ED; Will continue on LR at 100cc/hour  - Continue on Aztreonam and Flagyl given antibiotic intolerances  - Stool studies ordered: GI PCR, C. Diff, Lactoferrin, Calprotectin  - GI consult : f/u ESR and CRP , outpatient colonoscopy in 6-8 weeks + EGD   - clear liquid diet  - f/u Bcx ,Ux         # Type 1 Diabetes with Diabetic Neuropathy  - Insulin pump removed by patient   - Endo; Dr. Galvez consulted - Increased to Lantus to 26; Lispro 6 pre-meals and 1 sliding scale if eating  - Start on Lantus 20 and Sliding Scale  - a1c 7.8      # Incidental Hepatic Hypodensities   - outpt GI and  Outpatient MRI follow-up     # Anxiety  # Pseudoseizures  - Continue on home meds: lexapro 10mg PO daily, Amitriptyline 75mg PO qHS    # GERD  - Continue on Protonix    Activity: As tolerated   Diet: Clear Liquid  DVT ppx: Lovenox  GI ppx: Protonix  Code Status: Full Code  DISPO: Acute   Handoff : f/u stool studies ,ESR ,CRP ,Bcx ,Ux                                                                                                        ----------------------------------------------------  # DVT prophylaxis     # GI prophylaxis     # Diet     # Activity Score (AM-PAC)    # Code status     # Disposition                                                                              --------------------------------------------------------    Quincy Garcia

## 2022-03-10 NOTE — CONSULT NOTE ADULT - ASSESSMENT
36 year old female with PMHx of Type 1 Diabetes on insulin pump, Diabetic Neuropathy, sinus tachycardia, Anxiety with syncope, pseudoseizures, HTN, Chronic back pain, Migraines who presented to the ED with one day history of NBNB emesis and diarrhea (non bloody).     #2nd episode of acute colitis (involving cecum) in 1 year - r/o infectious vs IBD etiology  - +sepsis  - never had egd/cf  - CT findings as above    Rec  - Please send GI PCR and C. diff along with fecal calprotectin and lactoferrin  - please send ESR and CRP  - IV abx  - clear liquid diet  - outpatient colonoscopy in 6-8 weeks +EGd

## 2022-03-10 NOTE — CONSULT NOTE ADULT - SUBJECTIVE AND OBJECTIVE BOX
Gastroenterology Consultation:    Patient is a 36y old  Female who presents with a chief complaint of Colitis (10 Mar 2022 08:13)        Admitted on: 03-09-22      HPI:  This is a 36 year old female with PMHx of Type 1 Diabetes on insulin pump, Diabetic Neuropathy, sinus tachycardia, Anxiety with syncope, pseudoseizures, HTN, Chronic back pain, Migraines who presented to the ED with one day history of NBNB emesis and diarrhea (non bloody). The patient states that it started one day ago and she had been unable to keep anything down. She notes that her insulin pump detected a finger stick over 400 and she subsequently gave herself 9 units of insulin. The patient then came to the ED.     In the ED initial vitals: /71, , T 101.8, Sat 100% on room air. Labs noted for mildly elevated Beta-hydroxy at 1.0, UA noted for ketones  in the urine and glucose over 1000. VBG pH 7.35 with lactate over 2.5. CT concerning for colitis. Given antibiotics and admitted to medicine.     [facility] rec reviewed with the patient who has an active list of her medications and is of sound mind.  (09 Mar 2022 17:20)        Prior EGD: none     Prior Colonoscopy: none      PAST MEDICAL & SURGICAL HISTORY:  Neuropathy  right lower extremity, vaginal    Type 1 diabetes    Degenerative disc disease, thoracic    Urinary tract infection, recurrent    Gastroesophageal reflux disease, esophagitis presence not specified    Intractable headache, unspecified chronicity pattern, unspecified headache type    Major depressive disorder with single episode, in full remission  previously treated with zyprexa, celexa and lexapro    Tremor of right hand    Tachycardia    Spinal stenosis    No significant past surgical history          FAMILY HISTORY:  No pertinent family history in first degree relatives        Social History:  Tobacco: deneis   Alcohol: denies   Drugs: denies    Home Medications:  amitriptyline 75 mg oral tablet: 1 tab(s) orally once a day (at bedtime) (09 Mar 2022 17:50)  ammonium lactate 12% topical lotion: Apply topically to affected area 2 times a day (09 Mar 2022 17:50)  aspirin 81 mg oral tablet, chewable: 1 tab(s) orally once a day (09 Mar 2022 17:50)  indomethacin 50 mg oral capsule: 1 cap(s) orally 3 times a day, As Needed (09 Mar 2022 17:50)  insulin lispro: once a day (09 Mar 2022 17:50)  Lexapro 10 mg oral tablet: 1 tab(s) orally once a day (09 Mar 2022 17:50)  metoprolol tartrate 25 mg oral tablet: 1 tab(s) orally 2 times a day (09 Mar 2022 17:50)  multivitamin: 1 tab(s) orally once a day (09 Mar 2022 17:50)        MEDICATIONS  (STANDING):  amitriptyline 75 milliGRAM(s) Oral at bedtime  ammonium lactate 12% Lotion 1 Application(s) Topical two times a day  aspirin  chewable 81 milliGRAM(s) Oral daily  aztreonam  IVPB 2000 milliGRAM(s) IV Intermittent every 8 hours  dextrose 40% Gel 15 Gram(s) Oral once  dextrose 5%. 1000 milliLiter(s) (50 mL/Hr) IV Continuous <Continuous>  dextrose 5%. 1000 milliLiter(s) (100 mL/Hr) IV Continuous <Continuous>  dextrose 50% Injectable 25 Gram(s) IV Push once  dextrose 50% Injectable 12.5 Gram(s) IV Push once  dextrose 50% Injectable 25 Gram(s) IV Push once  enoxaparin Injectable 40 milliGRAM(s) SubCutaneous every 24 hours  escitalopram 10 milliGRAM(s) Oral daily  glucagon  Injectable 1 milliGRAM(s) IntraMuscular once  insulin glargine Injectable (LANTUS) 20 Unit(s) SubCutaneous at bedtime  insulin lispro (ADMELOG) corrective regimen sliding scale   SubCutaneous three times a day before meals  insulin lispro Injectable (ADMELOG) 5 Unit(s) SubCutaneous three times a day before meals  lactated ringers. 1000 milliLiter(s) (100 mL/Hr) IV Continuous <Continuous>  metoprolol tartrate 25 milliGRAM(s) Oral two times a day  metroNIDAZOLE  IVPB 500 milliGRAM(s) IV Intermittent every 8 hours  multivitamin 1 Tablet(s) Oral daily  pantoprazole    Tablet 40 milliGRAM(s) Oral before breakfast  senna 2 Tablet(s) Oral at bedtime    MEDICATIONS  (PRN):      Allergies  Ceclor (Rash)  Cipro (Other)  clindamycin (Hives; Nephrotoxicity)  gabapentin (Other)  Influenza Virus Vaccine, H5N1, Inactivated (Other)  penicillins (Hives)      Review of Systems:   Constitutional:  No Fever, No Chills  ENT/Mouth:  No Hearing Changes,  No Difficulty Swallowing  Eyes:  No Eye Pain, No Vision Changes  Cardiovascular:  No Chest Pain, No Palpitations  Respiratory:  No Cough, No Dyspnea  Gastrointestinal:  As described in HPI  Musculoskeletal:  No Joint Swelling, No Back Pain  Skin:  No Skin Lesions, No Jaundice  Neuro:  No Syncope, No Dizziness  Heme/Lymph:  No Bruising, No Bleeding.          Physical Examination:  T(C): 36.6 (03-10-22 @ 08:00), Max: 38.8 (03-09-22 @ 11:18)  HR: 89 (03-10-22 @ 08:00) (89 - 135)  BP: 99/52 (03-10-22 @ 08:00) (99/52 - 143/63)  RR: 18 (03-10-22 @ 08:00) (17 - 20)  SpO2: 97% (03-10-22 @ 08:00) (97% - 100%)  Height (cm): 165.1 (03-09-22 @ 11:18)  Weight (kg): 73 (03-09-22 @ 11:18)        GENERAL: AAOx3, no acute distress.  HEAD:  Atraumatic, Normocephalic  EYES: conjunctiva and sclera clear  NECK: Supple, no JVD or thyromegaly  CHEST/LUNG: Clear to auscultation bilaterally; No wheeze, rhonchi, or rales  HEART: Regular rate and rhythm; normal S1, S2, No murmurs.  ABDOMEN: Soft, nontender, nondistended; Bowel sounds present  NEUROLOGY: No asterixis or tremor.   SKIN: Intact, no jaundice        Data:                        10.0   6.17  )-----------( 438      ( 10 Mar 2022 04:30 )             32.8     Hgb Trend:  10.0  03-10-22 @ 04:30  11.4  03-09-22 @ 12:30      03-10    134<L>  |  100  |  7<L>  ----------------------------<  208<H>  4.4   |  21  |  0.6<L>    Ca    9.0      10 Mar 2022 04:30  Phos  2.6     03-09  Mg     1.8     03-10    TPro  6.8  /  Alb  4.1  /  TBili  <0.2  /  DBili  x   /  AST  20  /  ALT  25  /  AlkPhos  89  03-10    Liver panel trend:  TBili <0.2   /   AST 20   /   ALT 25   /   AlkP 89   /   Tptn 6.8   /   Alb 4.1    /   DBili --      03-10  TBili 0.5   /   AST 18   /   ALT 26   /   AlkP 108   /   Tptn 8.2   /   Alb 4.9    /   DBili --      03-09              Radiology:  CT Abdomen and Pelvis w/ IV Cont:   ACC: [id number] EXAM:  CT ABDOMEN AND PELVIS IC                          PROCEDURE DATE:  03/09/2022          INTERPRETATION:  CLINICAL STATEMENT: Right lower quadrant pain    TECHNIQUE: Contiguous axial CT images were obtained from the lower chest   to the pubic symphysis after 100 cc of Omnipaque 350 intravenous   contrast.  Oral contrast was not given.  Reformatted images in the   coronal and sagittal planes were acquired.    COMPARISON CT: 11/29/2021 and 12/2/2021 and 1/28/2022      FINDINGS:    LOWER CHEST: Minimal atelectasis..    HEPATOBILIARY: Fatty infiltration of the liver. The gallbladder is   present. Previously noted hepatic hypodensities noted on CT of 12/2/2021   are stable, but are better seen on that prior examination.    SPLEEN: Unremarkable..    PANCREAS: Unremarkable..    ADRENAL GLANDS: Unremarkable..    KIDNEYS: Unremarkable..    ABDOMINOPELVIC NODES: Stable mesenteric and right lower quadrant   subcentimeter lymph nodes, nonspecific.    PELVIC ORGANS: Uterus and adnexa are incompletely evaluated on CT.    PERITONEUM/MESENTERY/BOWEL: There is questionable cecal wall thickening.   There is no free air or obstruction. The appendix is unremarkable.    BONES/SOFT TISSUES: Unremarkable for age..    OTHER: Abdominal aorta is normal in caliber..      IMPRESSION:    Questionable cecal wall thickening, which may represent colitis.    Interval follow-up is recommended to demonstrate resolution.    Stable indeterminate hepatic hypodensities dating back to at least   abdominal pelvic CT of 11/29/2021. Outpatient MRI follow-up is   recommended when the patient is able to    --- End of Report ---            SALOME FOX MD; Attending Radiologist  This document has been electronically signed. Mar  9 2022  3:27PM (03-09-22 @ 13:50)

## 2022-03-11 ENCOUNTER — NON-APPOINTMENT (OUTPATIENT)
Age: 37
End: 2022-03-11

## 2022-03-11 LAB
ALBUMIN SERPL ELPH-MCNC: 3.7 G/DL — SIGNIFICANT CHANGE UP (ref 3.5–5.2)
ALP SERPL-CCNC: 79 U/L — SIGNIFICANT CHANGE UP (ref 30–115)
ALT FLD-CCNC: 44 U/L — HIGH (ref 0–41)
ANION GAP SERPL CALC-SCNC: 13 MMOL/L — SIGNIFICANT CHANGE UP (ref 7–14)
AST SERPL-CCNC: 40 U/L — SIGNIFICANT CHANGE UP (ref 0–41)
BASOPHILS # BLD AUTO: 0.04 K/UL — SIGNIFICANT CHANGE UP (ref 0–0.2)
BASOPHILS NFR BLD AUTO: 0.8 % — SIGNIFICANT CHANGE UP (ref 0–1)
BILIRUB SERPL-MCNC: <0.2 MG/DL — SIGNIFICANT CHANGE UP (ref 0.2–1.2)
BUN SERPL-MCNC: 5 MG/DL — LOW (ref 10–20)
CALCIUM SERPL-MCNC: 8.5 MG/DL — SIGNIFICANT CHANGE UP (ref 8.5–10.1)
CHLORIDE SERPL-SCNC: 101 MMOL/L — SIGNIFICANT CHANGE UP (ref 98–110)
CO2 SERPL-SCNC: 22 MMOL/L — SIGNIFICANT CHANGE UP (ref 17–32)
CREAT SERPL-MCNC: 0.5 MG/DL — LOW (ref 0.7–1.5)
CRP SERPL-MCNC: 45 MG/L — HIGH
CULTURE RESULTS: SIGNIFICANT CHANGE UP
CULTURE RESULTS: SIGNIFICANT CHANGE UP
EGFR: 125 ML/MIN/1.73M2 — SIGNIFICANT CHANGE UP
EOSINOPHIL # BLD AUTO: 0.06 K/UL — SIGNIFICANT CHANGE UP (ref 0–0.7)
EOSINOPHIL NFR BLD AUTO: 1.2 % — SIGNIFICANT CHANGE UP (ref 0–8)
ERYTHROCYTE [SEDIMENTATION RATE] IN BLOOD: 20 MM/HR — SIGNIFICANT CHANGE UP (ref 0–20)
GLUCOSE BLDC GLUCOMTR-MCNC: 134 MG/DL — HIGH (ref 70–99)
GLUCOSE BLDC GLUCOMTR-MCNC: 136 MG/DL — HIGH (ref 70–99)
GLUCOSE BLDC GLUCOMTR-MCNC: 141 MG/DL — HIGH (ref 70–99)
GLUCOSE BLDC GLUCOMTR-MCNC: 205 MG/DL — HIGH (ref 70–99)
GLUCOSE BLDC GLUCOMTR-MCNC: 252 MG/DL — HIGH (ref 70–99)
GLUCOSE BLDC GLUCOMTR-MCNC: 271 MG/DL — HIGH (ref 70–99)
GLUCOSE BLDC GLUCOMTR-MCNC: 48 MG/DL — CRITICAL LOW (ref 70–99)
GLUCOSE SERPL-MCNC: 266 MG/DL — HIGH (ref 70–99)
HCT VFR BLD CALC: 30.3 % — LOW (ref 37–47)
HGB BLD-MCNC: 9.4 G/DL — LOW (ref 12–16)
IMM GRANULOCYTES NFR BLD AUTO: 0.4 % — HIGH (ref 0.1–0.3)
LYMPHOCYTES # BLD AUTO: 1.63 K/UL — SIGNIFICANT CHANGE UP (ref 1.2–3.4)
LYMPHOCYTES # BLD AUTO: 33.2 % — SIGNIFICANT CHANGE UP (ref 20.5–51.1)
MAGNESIUM SERPL-MCNC: 1.7 MG/DL — LOW (ref 1.8–2.4)
MCHC RBC-ENTMCNC: 24.4 PG — LOW (ref 27–31)
MCHC RBC-ENTMCNC: 31 G/DL — LOW (ref 32–37)
MCV RBC AUTO: 78.5 FL — LOW (ref 81–99)
MONOCYTES # BLD AUTO: 0.6 K/UL — SIGNIFICANT CHANGE UP (ref 0.1–0.6)
MONOCYTES NFR BLD AUTO: 12.2 % — HIGH (ref 1.7–9.3)
NEUTROPHILS # BLD AUTO: 2.56 K/UL — SIGNIFICANT CHANGE UP (ref 1.4–6.5)
NEUTROPHILS NFR BLD AUTO: 52.2 % — SIGNIFICANT CHANGE UP (ref 42.2–75.2)
NRBC # BLD: 0 /100 WBCS — SIGNIFICANT CHANGE UP (ref 0–0)
PLATELET # BLD AUTO: 330 K/UL — SIGNIFICANT CHANGE UP (ref 130–400)
POTASSIUM SERPL-MCNC: 4 MMOL/L — SIGNIFICANT CHANGE UP (ref 3.5–5)
POTASSIUM SERPL-SCNC: 4 MMOL/L — SIGNIFICANT CHANGE UP (ref 3.5–5)
PROT SERPL-MCNC: 6.5 G/DL — SIGNIFICANT CHANGE UP (ref 6–8)
RBC # BLD: 3.86 M/UL — LOW (ref 4.2–5.4)
RBC # FLD: 15.9 % — HIGH (ref 11.5–14.5)
SARS-COV-2 RNA SPEC QL NAA+PROBE: SIGNIFICANT CHANGE UP
SODIUM SERPL-SCNC: 136 MMOL/L — SIGNIFICANT CHANGE UP (ref 135–146)
SPECIMEN SOURCE: SIGNIFICANT CHANGE UP
SPECIMEN SOURCE: SIGNIFICANT CHANGE UP
WBC # BLD: 4.91 K/UL — SIGNIFICANT CHANGE UP (ref 4.8–10.8)
WBC # FLD AUTO: 4.91 K/UL — SIGNIFICANT CHANGE UP (ref 4.8–10.8)

## 2022-03-11 PROCEDURE — 99231 SBSQ HOSP IP/OBS SF/LOW 25: CPT

## 2022-03-11 PROCEDURE — 99233 SBSQ HOSP IP/OBS HIGH 50: CPT

## 2022-03-11 RX ORDER — SIMETHICONE 80 MG/1
80 TABLET, CHEWABLE ORAL ONCE
Refills: 0 | Status: COMPLETED | OUTPATIENT
Start: 2022-03-11 | End: 2022-03-11

## 2022-03-11 RX ORDER — DEXTROSE 10 % IN WATER 10 %
10 INTRAVENOUS SOLUTION INTRAVENOUS ONCE
Refills: 0 | Status: COMPLETED | OUTPATIENT
Start: 2022-03-11 | End: 2022-03-11

## 2022-03-11 RX ORDER — MAGNESIUM SULFATE 500 MG/ML
2 VIAL (ML) INJECTION ONCE
Refills: 0 | Status: COMPLETED | OUTPATIENT
Start: 2022-03-11 | End: 2022-03-11

## 2022-03-11 RX ADMIN — Medication 1 APPLICATION(S): at 05:16

## 2022-03-11 RX ADMIN — Medication 6 UNIT(S): at 09:01

## 2022-03-11 RX ADMIN — Medication 100 MILLIGRAM(S): at 05:17

## 2022-03-11 RX ADMIN — Medication 100 MILLIGRAM(S): at 14:31

## 2022-03-11 RX ADMIN — Medication 25 GRAM(S): at 12:58

## 2022-03-11 RX ADMIN — ENOXAPARIN SODIUM 40 MILLIGRAM(S): 100 INJECTION SUBCUTANEOUS at 17:09

## 2022-03-11 RX ADMIN — Medication 75 MILLIGRAM(S): at 22:36

## 2022-03-11 RX ADMIN — Medication 25 MILLIGRAM(S): at 05:17

## 2022-03-11 RX ADMIN — Medication 25 MILLIGRAM(S): at 17:09

## 2022-03-11 RX ADMIN — SIMETHICONE 80 MILLIGRAM(S): 80 TABLET, CHEWABLE ORAL at 02:13

## 2022-03-11 RX ADMIN — Medication 81 MILLIGRAM(S): at 12:57

## 2022-03-11 RX ADMIN — Medication 650 MILLIGRAM(S): at 22:36

## 2022-03-11 RX ADMIN — Medication 1 TABLET(S): at 12:57

## 2022-03-11 RX ADMIN — INSULIN GLARGINE 26 UNIT(S): 100 INJECTION, SOLUTION SUBCUTANEOUS at 22:01

## 2022-03-11 RX ADMIN — Medication 6 UNIT(S): at 12:59

## 2022-03-11 RX ADMIN — Medication 100 MILLIGRAM(S): at 05:16

## 2022-03-11 RX ADMIN — Medication 3: at 09:01

## 2022-03-11 RX ADMIN — Medication 1 APPLICATION(S): at 17:19

## 2022-03-11 RX ADMIN — Medication 40 MILLILITER(S): at 17:19

## 2022-03-11 RX ADMIN — ESCITALOPRAM OXALATE 10 MILLIGRAM(S): 10 TABLET, FILM COATED ORAL at 12:57

## 2022-03-11 NOTE — PROGRESS NOTE ADULT - ASSESSMENT
36 year old female with PMHx of Type 1 Diabetes on insulin pump, Diabetic Neuropathy, sinus tachycardia, Anxiety with syncope, pseudoseizures, HTN, Chronic back pain, Migraines who presented to the ED with one day history of NBNB emesis and diarrhea (non bloody)      ASSESSMENT & PLAN:      Acute Colitis   Hematochezia  Sepsis on Admission   Hyperglycemia      C diff  ruled out  GI pcr pending  Advance diet as tolerated  Discontinue IVF  GI noted- outpatient colonoscopy & EGD in 6-8 weeks  Hyperglycemia- optimize Lantus & lispro with sliding scale coverage( possible non-compliance with diabetic diet)  Incidental Hepatic Hypodensities -MRI as outpatient & follow with GI  Continue current medical management for active chronic comorbid conditions.  DVT Prophylaxis- lovenox s/q  Supportive care including activity, diet, goals of care discussed in AM rounds.  Discussed with  / in AM rounds  Handoff: Acute inpatient management  required further

## 2022-03-11 NOTE — PROGRESS NOTE ADULT - SUBJECTIVE AND OBJECTIVE BOX
Progress Note:  Provider Speciality                            Hospitalist      CRISTI MONTGOMERY MRN-[id number] 36y Female     CHIEF PRESENTING COMPLAINT:  Patient is a 36y old  Female who presents with a chief complaint of Colitis (10 Mar 2022 10:13)        SUBJECTIVE:  Patient was seen and examined at bedside. Reports no active symptoms of bleeding in AM  No significant overnight events reported.     HISTORY OF PRESENTING ILLNESS:  HPI:  This is a 36 year old female with PMHx of Type 1 Diabetes on insulin pump, Diabetic Neuropathy, sinus tachycardia, Anxiety with syncope, pseudoseizures, HTN, Chronic back pain, Migraines who presented to the ED with one day history of NBNB emesis and diarrhea (non bloody). The patient states that it started one day ago and she had been unable to keep anything down. She notes that her insulin pump detected a finger stick over 400 and she subsequently gave herself 9 units of insulin. The patient then came to the ED.     In the ED initial vitals: /71, , T 101.8, Sat 100% on room air. Labs noted for mildly elevated Beta-hydroxy at 1.0, UA noted for ketones  in the urine and glucose over 1000. VBG pH 7.35 with lactate over 2.5. CT concerning for colitis. Given antibiotics and admitted to medicine.     [facility] rec reviewed with the patient who has an active list of her medications and is of sound mind.  (09 Mar 2022 17:20)        REVIEW OF SYSTEMS:  Patient denies any headache, any vision complaints, runny nose, fever, chills. Denies chest pain, shortness of breath, palpitation. Denies nausea, vomiting, abdominal pain , Denies dysuria. Denies  localized weakness in any part of the body or numbness.   At least 10 systems were reviewed in ROS. All systems reviewed  are within normal limits except for the complaints as described in Subjective.    PAST MEDICAL & SURGICAL HISTORY:  PAST MEDICAL & SURGICAL HISTORY:  Neuropathy  right lower extremity, vaginal    Type 1 diabetes    Degenerative disc disease, thoracic    Urinary tract infection, recurrent    Gastroesophageal reflux disease, esophagitis presence not specified    Intractable headache, unspecified chronicity pattern, unspecified headache type    Major depressive disorder with single episode, in full remission  previously treated with zyprexa, celexa and lexapro    Tremor of right hand    Tachycardia    Spinal stenosis    No significant past surgical history            VITAL SIGNS:  Vital Signs Last 24 Hrs  T(C): 36.6 (11 Mar 2022 08:02), Max: 36.6 (11 Mar 2022 08:02)  T(F): 97.8 (11 Mar 2022 08:02), Max: 97.8 (11 Mar 2022 08:02)  HR: 89 (11 Mar 2022 08:02) (89 - 104)  BP: 132/63 (11 Mar 2022 08:02) (116/59 - 132/63)  BP(mean): --  RR: 18 (11 Mar 2022 08:02) (18 - 18)  SpO2: --        PHYSICAL EXAMINATION:  Not in acute distress  General: No icterus  HEENT:   no JVD.  Heart: S1+S2 audible  Lungs: bilateral  moderate air entry, no wheezing, no crepitations.  Abdomen: Soft, non-tender, non-distended , no  rigidity or guarding.  CNS: Awake alert, CN  grossly intact.  Extremities:  No edema            CONSULTS:  Consultant(s) Notes Reviewed by me.   Care Discussed with Consultants/Other Providers where required.        MEDICATIONS:  MEDICATIONS  (STANDING):  amitriptyline 75 milliGRAM(s) Oral at bedtime  ammonium lactate 12% Lotion 1 Application(s) Topical two times a day  aspirin  chewable 81 milliGRAM(s) Oral daily  aztreonam  IVPB 2000 milliGRAM(s) IV Intermittent every 8 hours  dextrose 40% Gel 15 Gram(s) Oral once  dextrose 5%. 1000 milliLiter(s) (50 mL/Hr) IV Continuous <Continuous>  dextrose 5%. 1000 milliLiter(s) (100 mL/Hr) IV Continuous <Continuous>  dextrose 50% Injectable 25 Gram(s) IV Push once  dextrose 50% Injectable 12.5 Gram(s) IV Push once  dextrose 50% Injectable 25 Gram(s) IV Push once  enoxaparin Injectable 40 milliGRAM(s) SubCutaneous every 24 hours  escitalopram 10 milliGRAM(s) Oral daily  glucagon  Injectable 1 milliGRAM(s) IntraMuscular once  insulin glargine Injectable (LANTUS) 20 Unit(s) SubCutaneous at bedtime  insulin lispro (ADMELOG) corrective regimen sliding scale   SubCutaneous three times a day before meals  insulin lispro Injectable (ADMELOG) 5 Unit(s) SubCutaneous three times a day before meals  lactated ringers. 1000 milliLiter(s) (100 mL/Hr) IV Continuous <Continuous>  metoprolol tartrate 25 milliGRAM(s) Oral two times a day  metroNIDAZOLE  IVPB 500 milliGRAM(s) IV Intermittent every 8 hours  multivitamin 1 Tablet(s) Oral daily  pantoprazole    Tablet 40 milliGRAM(s) Oral before breakfast  senna 2 Tablet(s) Oral at bedtime    MEDICATIONS  (PRN):            ASSESSMENT:

## 2022-03-11 NOTE — PROGRESS NOTE ADULT - ASSESSMENT
#type 1 DM   - off insulin pump now   - POC reviewed , tolerated clear liquid this am but was unable to tolerate lunch   -  Keep Lantus  26 units at bedtime , if in am FS > 180 then increase to 28 units   - Continue  lispro  6 units TIDAC , hold if NPO or not tolerating diet   - Continue ISS 1:50> 150  please do not hold her basal insulin as patient has type 1 DM and can go into DKA.   on discharge she will resume her insulin pump

## 2022-03-11 NOTE — PROGRESS NOTE ADULT - SUBJECTIVE AND OBJECTIVE BOX
S: patient seen this pm ,tolerated breakfast but since lunch time  feeling lightheaded and dizzy did not eat much for lunch , FS checked stat was 136   O:Vital Signs Last 24 Hrs  T(C): 36.6 (11 Mar 2022 08:02), Max: 36.6 (11 Mar 2022 08:02)  T(F): 97.8 (11 Mar 2022 08:02), Max: 97.8 (11 Mar 2022 08:02)  HR: 89 (11 Mar 2022 08:02) (89 - 104)  BP: 132/63 (11 Mar 2022 08:02) (116/59 - 132/63)  BP(mean): --  RR: 18 (11 Mar 2022 08:02) (18 - 18)  SpO2: --    PE: awake alert, lying in bed   Heart : Rs1s2   lungs : clear   abdomen : soft   LE: no edema     03-11    136  |  101  |  5<L>  ----------------------------<  266<H>  4.0   |  22  |  0.5<L>    Ca    8.5      11 Mar 2022 07:04  Mg     1.7     03-11    TPro  6.5  /  Alb  3.7  /  TBili  <0.2  /  DBili  x   /  AST  40  /  ALT  44<H>  /  AlkPhos  79  03-11      CAPILLARY BLOOD GLUCOSE      POCT Blood Glucose.: 134 mg/dL (11 Mar 2022 14:00)  POCT Blood Glucose.: 136 mg/dL (11 Mar 2022 12:10)  POCT Blood Glucose.: 271 mg/dL (11 Mar 2022 08:45)  POCT Blood Glucose.: 252 mg/dL (11 Mar 2022 06:40)  POCT Blood Glucose.: 270 mg/dL (10 Mar 2022 21:14)  POCT Blood Glucose.: 152 mg/dL (10 Mar 2022 17:09)        MEDICATIONS  (STANDING):  amitriptyline 75 milliGRAM(s) Oral at bedtime  ammonium lactate 12% Lotion 1 Application(s) Topical two times a day  aspirin  chewable 81 milliGRAM(s) Oral daily  dextrose 40% Gel 15 Gram(s) Oral once  dextrose 5%. 1000 milliLiter(s) (50 mL/Hr) IV Continuous <Continuous>  dextrose 5%. 1000 milliLiter(s) (100 mL/Hr) IV Continuous <Continuous>  dextrose 50% Injectable 25 Gram(s) IV Push once  dextrose 50% Injectable 12.5 Gram(s) IV Push once  dextrose 50% Injectable 25 Gram(s) IV Push once  enoxaparin Injectable 40 milliGRAM(s) SubCutaneous every 24 hours  escitalopram 10 milliGRAM(s) Oral daily  glucagon  Injectable 1 milliGRAM(s) IntraMuscular once  insulin glargine Injectable (LANTUS) 26 Unit(s) SubCutaneous at bedtime  insulin lispro (ADMELOG) corrective regimen sliding scale   SubCutaneous three times a day before meals  insulin lispro Injectable (ADMELOG) 6 Unit(s) SubCutaneous three times a day before meals  lactated ringers. 1000 milliLiter(s) (100 mL/Hr) IV Continuous <Continuous>  metoprolol tartrate 25 milliGRAM(s) Oral two times a day  multivitamin 1 Tablet(s) Oral daily  pantoprazole    Tablet 40 milliGRAM(s) Oral before breakfast  senna 2 Tablet(s) Oral at bedtime    MEDICATIONS  (PRN):  acetaminophen     Tablet .. 650 milliGRAM(s) Oral every 6 hours PRN Moderate Pain (4 - 6)

## 2022-03-11 NOTE — PROGRESS NOTE ADULT - SUBJECTIVE AND OBJECTIVE BOX
CRISTI MONTGOMERY 36y Female  MRN#: [id number]   CODE STATUS:________    Hospital Day: 2d    Pt is currently admitted with the primary diagnosis of colitis.    SUBJECTIVE  Hospital Course  This is a 36 year old female with PMHx of Type 1 Diabetes on insulin pump, Diabetic Neuropathy, sinus tachycardia, Anxiety with syncope, pseudoseizures, HTN, Chronic back pain, Migraines who presented to the ED with one day history of NBNB emesis and diarrhea (non bloody). The patient states that it started one day ago and she had been unable to keep anything down. She notes that her insulin pump detected a finger stick over 400 and she subsequently gave herself 9 units of insulin. The patient then came to the ED.     The patient was seen and examined ,alert .oriented comfortable in bed.  Reports lower abdominal pain.                                              ----------------------------------------------------------  OBJECTIVE  PAST MEDICAL & SURGICAL HISTORY  Neuropathy  right lower extremity, vaginal    Type 1 diabetes    Degenerative disc disease, thoracic    Urinary tract infection, recurrent    Gastroesophageal reflux disease, esophagitis presence not specified    Intractable headache, unspecified chronicity pattern, unspecified headache type    Major depressive disorder with single episode, in full remission  previously treated with zyprexa, celexa and lexapro    Tremor of right hand    Tachycardia    Spinal stenosis    No significant past surgical history                                              -----------------------------------------------------------  ALLERGIES:  Ceclor (Rash)  clindamycin (Hives; Nephrotoxicity)  penicillins (Hives)                                            ------------------------------------------------------------    HOME MEDICATIONS  Home Medications:  amitriptyline 75 mg oral tablet: 1 tab(s) orally once a day (at bedtime) (09 Mar 2022 17:50)  ammonium lactate 12% topical lotion: Apply topically to affected area 2 times a day (09 Mar 2022 17:50)  aspirin 81 mg oral tablet, chewable: 1 tab(s) orally once a day (09 Mar 2022 17:50)  indomethacin 50 mg oral capsule: 1 cap(s) orally 3 times a day, As Needed (09 Mar 2022 17:50)  insulin lispro: once a day (09 Mar 2022 17:50)  Lexapro 10 mg oral tablet: 1 tab(s) orally once a day (09 Mar 2022 17:50)  metoprolol tartrate 25 mg oral tablet: 1 tab(s) orally 2 times a day (09 Mar 2022 17:50)  multivitamin: 1 tab(s) orally once a day (09 Mar 2022 17:50)                           MEDICATIONS:  STANDING MEDICATIONS  amitriptyline 75 milliGRAM(s) Oral at bedtime  ammonium lactate 12% Lotion 1 Application(s) Topical two times a day  aspirin  chewable 81 milliGRAM(s) Oral daily  aztreonam  IVPB 2000 milliGRAM(s) IV Intermittent every 8 hours  dextrose 40% Gel 15 Gram(s) Oral once  dextrose 5%. 1000 milliLiter(s) IV Continuous <Continuous>  dextrose 5%. 1000 milliLiter(s) IV Continuous <Continuous>  dextrose 50% Injectable 25 Gram(s) IV Push once  dextrose 50% Injectable 12.5 Gram(s) IV Push once  dextrose 50% Injectable 25 Gram(s) IV Push once  enoxaparin Injectable 40 milliGRAM(s) SubCutaneous every 24 hours  escitalopram 10 milliGRAM(s) Oral daily  glucagon  Injectable 1 milliGRAM(s) IntraMuscular once  insulin glargine Injectable (LANTUS) 26 Unit(s) SubCutaneous at bedtime  insulin lispro (ADMELOG) corrective regimen sliding scale   SubCutaneous three times a day before meals  insulin lispro Injectable (ADMELOG) 6 Unit(s) SubCutaneous three times a day before meals  lactated ringers. 1000 milliLiter(s) IV Continuous <Continuous>  magnesium sulfate  IVPB 2 Gram(s) IV Intermittent once  metoprolol tartrate 25 milliGRAM(s) Oral two times a day  metroNIDAZOLE  IVPB 500 milliGRAM(s) IV Intermittent every 8 hours  multivitamin 1 Tablet(s) Oral daily  pantoprazole    Tablet 40 milliGRAM(s) Oral before breakfast  senna 2 Tablet(s) Oral at bedtime    PRN MEDICATIONS  acetaminophen     Tablet .. 650 milliGRAM(s) Oral every 6 hours PRN                                            ------------------------------------------------------------  VITAL SIGNS: Last 24 Hours  T(C): 36.5 (11 Mar 2022 00:51), Max: 36.5 (11 Mar 2022 00:51)  T(F): 97.7 (11 Mar 2022 00:51), Max: 97.7 (11 Mar 2022 00:51)  HR: 89 (11 Mar 2022 00:51) (89 - 104)  BP: 116/59 (11 Mar 2022 00:51) (116/59 - 123/58)  BP(mean): --  RR: 18 (11 Mar 2022 00:51) (18 - 18)  SpO2: --                                             --------------------------------------------------------------  LABS:                        9.4    4.91  )-----------( 330      ( 11 Mar 2022 07:04 )             30.3     03-11    136  |  101  |  5<L>  ----------------------------<  266<H>  4.0   |  22  |  0.5<L>    Ca    8.5      11 Mar 2022 07:04  Phos  2.6     03-09  Mg     1.7     03-11    TPro  6.5  /  Alb  3.7  /  TBili  <0.2  /  DBili  x   /  AST  40  /  ALT  44<H>  /  AlkPhos  79  03-11      Urinalysis Basic - ( 09 Mar 2022 13:42 )    Color: Light Yellow / Appearance: Clear / S.014 / pH: x  Gluc: x / Ketone: Large  / Bili: Negative / Urobili: <2 mg/dL   Blood: x / Protein: Negative / Nitrite: Negative   Leuk Esterase: Negative / RBC: x / WBC x   Sq Epi: x / Non Sq Epi: x / Bacteria: x        Sedimentation Rate, Erythrocyte: 20 mm/Hr (22 @ 07:04)        GI PCR Panel, Stool (collected 10 Mar 2022 15:08)  Source: .Stool Feces  Final Report (11 Mar 2022 07:28):    Norovirus GI/GII    DETECTED by PCR    *******Please Note:*******    GI panel PCR evaluates for:    Campylobacter, Plesiomonas shigelloides, Salmonella,    Vibrio, Yersinia enterocolitica, Enteroaggregative    Escherichia coli (EAEC), Enteropathogenic E.coli (EPEC),    Enterotoxigenic E. coli (ETEC) lt/st, Shiga-like    toxin-producing E. coli (STEC) stx1/stx2,    Shigella/ Enteroinvasive E. coli (EIEC), Cryptosporidium,    Cyclospora cayetanensis, Entamoeba histolytica,    Giardia lamblia, Adenovirus F 40/41, Astrovirus,    Norovirus GI/GII, Rotavirus A, Sapovirus    Culture - Blood (collected 09 Mar 2022 11:50)  Source: .Blood Blood-Peripheral  Preliminary Report (10 Mar 2022 22:01):    No growth to date.    Culture - Blood (collected 09 Mar 2022 11:50)  Source: .Blood Blood-Peripheral  Preliminary Report (10 Mar 2022 22:01):    No growth to date.        CARDIAC MARKERS ( 09 Mar 2022 12:30 )  x     / <0.01 ng/mL / x     / x     / x                                                  -------------------------------------------------------------  RADIOLOGY:  < from: CT Abdomen and Pelvis w/ IV Cont (22 @ 13:50) >  Questionable cecal wall thickening, which may represent colitis.    Interval follow-up is recommended to demonstrate resolution.    Stable indeterminate hepatic hypodensities dating back to at least   abdominal pelvic CT of 2021. Outpatient MRI follow-up is   recommended when the patient is able to    < end of copied text >  < from: Xray Chest 1 View- PORTABLE-Urgent (22 @ 13:03) >  Impression:    No radiographic evidence of acute cardiopulmonary disease.    < end of copied text >                                            --------------------------------------------------------------    PHYSICAL EXAM:  GENERAL: NAD, lying in bed comfortably  HEAD:  Atraumatic, Normocephalic  EYES: EOMI, PERRLA, conjunctiva and sclera clear  ENT: Moist mucous membranes  CHEST/LUNG: Clear to auscultation bilaterally  HEART: Regular rate and rhythm; No murmurs, rubs, or gallops  ABDOMEN: Soft, Nontender, Nondistended.   EXTREMITIES: No clubbing, cyanosis, or edema  NERVOUS SYSTEM:  Alert & Oriented X3, speech clear. No deficits   MSK: FROM all 4 extremities, full and equal strength  SKIN: No rashes or lesions                                           --------------------------------------------------------------    ASSESSMENT & PLAN    Past medical history and hospital course     This is a 36 year old female with PMHx of Type 1 Diabetes on insulin pump, Diabetic Neuropathy, sinus tachycardia, Anxiety with syncope, pseudoseizures, HTN, Chronic back pain, Migraines who presented to the ED with one day history of NBNB emesis and diarrhea (non bloody).     # Colitis   # Sepsis on Admission   - Hematochezia   - hemodynamically stable  - CT Abdomen and Pelvis w/ IV Cont (22): Questionable cecal wall thickening, which may represent colitis.  - Given 2L in the ED; Will continue on LR at 100cc/hour  - Continue on Aztreonam and Flagyl given antibiotic intolerances  - Stool studies ordered: GI PCR, C. Diff, Lactoferrin, Calprotectin  - GI consult : f/u ESR and CRP , outpatient colonoscopy in 6-8 weeks + EGD   - clear liquid diet ,advanced to soft   - Bcx negative ,f/u Ucx         # Type 1 Diabetes with Diabetic Neuropathy  - Insulin pump removed by patient   - Endo; Dr. Galvez consulted - Increased to Lantus to 26; Lispro 6 pre-meals and 1 sliding scale if eating  - Start on Lantus 20 and Sliding Scale  - a1c 7.8      # Incidental Hepatic Hypodensities   - outpt GI and  Outpatient MRI follow-up     # Anxiety  # Pseudoseizures  - Continue on home meds: lexapro 10mg PO daily, Amitriptyline 75mg PO qHS    # GERD  - Continue on Protonix    Activity: As tolerated   Diet: Clear Liquid  DVT ppx: Lovenox  GI ppx: Protonix  Code Status: Full Code  DISPO: Acute   Handoff : f/u stool studies ,ESR ,CRP ,Bcx ,Ux ,advanced diet.

## 2022-03-12 LAB
ALBUMIN SERPL ELPH-MCNC: 3.8 G/DL — SIGNIFICANT CHANGE UP (ref 3.5–5.2)
ALP SERPL-CCNC: 87 U/L — SIGNIFICANT CHANGE UP (ref 30–115)
ALT FLD-CCNC: 44 U/L — HIGH (ref 0–41)
ANION GAP SERPL CALC-SCNC: 12 MMOL/L — SIGNIFICANT CHANGE UP (ref 7–14)
AST SERPL-CCNC: 33 U/L — SIGNIFICANT CHANGE UP (ref 0–41)
BASOPHILS # BLD AUTO: 0.03 K/UL — SIGNIFICANT CHANGE UP (ref 0–0.2)
BASOPHILS NFR BLD AUTO: 0.5 % — SIGNIFICANT CHANGE UP (ref 0–1)
BILIRUB SERPL-MCNC: <0.2 MG/DL — SIGNIFICANT CHANGE UP (ref 0.2–1.2)
BUN SERPL-MCNC: 3 MG/DL — LOW (ref 10–20)
CALCIUM SERPL-MCNC: 8.5 MG/DL — SIGNIFICANT CHANGE UP (ref 8.5–10.1)
CHLORIDE SERPL-SCNC: 98 MMOL/L — SIGNIFICANT CHANGE UP (ref 98–110)
CO2 SERPL-SCNC: 24 MMOL/L — SIGNIFICANT CHANGE UP (ref 17–32)
CREAT SERPL-MCNC: 0.5 MG/DL — LOW (ref 0.7–1.5)
EGFR: 125 ML/MIN/1.73M2 — SIGNIFICANT CHANGE UP
EOSINOPHIL # BLD AUTO: 0.05 K/UL — SIGNIFICANT CHANGE UP (ref 0–0.7)
EOSINOPHIL NFR BLD AUTO: 0.9 % — SIGNIFICANT CHANGE UP (ref 0–8)
GLUCOSE BLDC GLUCOMTR-MCNC: 184 MG/DL — HIGH (ref 70–99)
GLUCOSE BLDC GLUCOMTR-MCNC: 240 MG/DL — HIGH (ref 70–99)
GLUCOSE BLDC GLUCOMTR-MCNC: 317 MG/DL — HIGH (ref 70–99)
GLUCOSE BLDC GLUCOMTR-MCNC: 328 MG/DL — HIGH (ref 70–99)
GLUCOSE SERPL-MCNC: 242 MG/DL — HIGH (ref 70–99)
HCT VFR BLD CALC: 31.7 % — LOW (ref 37–47)
HGB BLD-MCNC: 9.9 G/DL — LOW (ref 12–16)
IMM GRANULOCYTES NFR BLD AUTO: 0.2 % — SIGNIFICANT CHANGE UP (ref 0.1–0.3)
LYMPHOCYTES # BLD AUTO: 1.77 K/UL — SIGNIFICANT CHANGE UP (ref 1.2–3.4)
LYMPHOCYTES # BLD AUTO: 31.9 % — SIGNIFICANT CHANGE UP (ref 20.5–51.1)
MAGNESIUM SERPL-MCNC: 1.8 MG/DL — SIGNIFICANT CHANGE UP (ref 1.8–2.4)
MCHC RBC-ENTMCNC: 24.8 PG — LOW (ref 27–31)
MCHC RBC-ENTMCNC: 31.2 G/DL — LOW (ref 32–37)
MCV RBC AUTO: 79.4 FL — LOW (ref 81–99)
MONOCYTES # BLD AUTO: 0.53 K/UL — SIGNIFICANT CHANGE UP (ref 0.1–0.6)
MONOCYTES NFR BLD AUTO: 9.5 % — HIGH (ref 1.7–9.3)
NEUTROPHILS # BLD AUTO: 3.16 K/UL — SIGNIFICANT CHANGE UP (ref 1.4–6.5)
NEUTROPHILS NFR BLD AUTO: 57 % — SIGNIFICANT CHANGE UP (ref 42.2–75.2)
NRBC # BLD: 0 /100 WBCS — SIGNIFICANT CHANGE UP (ref 0–0)
PLATELET # BLD AUTO: 394 K/UL — SIGNIFICANT CHANGE UP (ref 130–400)
POTASSIUM SERPL-MCNC: 4.5 MMOL/L — SIGNIFICANT CHANGE UP (ref 3.5–5)
POTASSIUM SERPL-SCNC: 4.5 MMOL/L — SIGNIFICANT CHANGE UP (ref 3.5–5)
PROT SERPL-MCNC: 6.4 G/DL — SIGNIFICANT CHANGE UP (ref 6–8)
RBC # BLD: 3.99 M/UL — LOW (ref 4.2–5.4)
RBC # FLD: 15.7 % — HIGH (ref 11.5–14.5)
SODIUM SERPL-SCNC: 134 MMOL/L — LOW (ref 135–146)
WBC # BLD: 5.55 K/UL — SIGNIFICANT CHANGE UP (ref 4.8–10.8)
WBC # FLD AUTO: 5.55 K/UL — SIGNIFICANT CHANGE UP (ref 4.8–10.8)

## 2022-03-12 PROCEDURE — 99231 SBSQ HOSP IP/OBS SF/LOW 25: CPT

## 2022-03-12 PROCEDURE — 99233 SBSQ HOSP IP/OBS HIGH 50: CPT

## 2022-03-12 RX ORDER — ONDANSETRON 8 MG/1
4 TABLET, FILM COATED ORAL ONCE
Refills: 0 | Status: COMPLETED | OUTPATIENT
Start: 2022-03-12 | End: 2022-03-12

## 2022-03-12 RX ADMIN — ONDANSETRON 4 MILLIGRAM(S): 8 TABLET, FILM COATED ORAL at 23:11

## 2022-03-12 RX ADMIN — PANTOPRAZOLE SODIUM 40 MILLIGRAM(S): 20 TABLET, DELAYED RELEASE ORAL at 09:30

## 2022-03-12 RX ADMIN — Medication 10 MILLIGRAM(S): at 17:23

## 2022-03-12 RX ADMIN — Medication 1 TABLET(S): at 12:36

## 2022-03-12 RX ADMIN — SODIUM CHLORIDE 100 MILLILITER(S): 9 INJECTION, SOLUTION INTRAVENOUS at 17:25

## 2022-03-12 RX ADMIN — ENOXAPARIN SODIUM 40 MILLIGRAM(S): 100 INJECTION SUBCUTANEOUS at 17:24

## 2022-03-12 RX ADMIN — ESCITALOPRAM OXALATE 10 MILLIGRAM(S): 10 TABLET, FILM COATED ORAL at 12:36

## 2022-03-12 RX ADMIN — Medication 10 MILLIGRAM(S): at 12:27

## 2022-03-12 RX ADMIN — Medication 4: at 11:41

## 2022-03-12 RX ADMIN — INSULIN GLARGINE 26 UNIT(S): 100 INJECTION, SOLUTION SUBCUTANEOUS at 22:16

## 2022-03-12 RX ADMIN — SENNA PLUS 2 TABLET(S): 8.6 TABLET ORAL at 22:16

## 2022-03-12 RX ADMIN — Medication 25 MILLIGRAM(S): at 05:54

## 2022-03-12 RX ADMIN — Medication 4: at 09:30

## 2022-03-12 RX ADMIN — Medication 650 MILLIGRAM(S): at 05:53

## 2022-03-12 RX ADMIN — Medication 25 MILLIGRAM(S): at 17:24

## 2022-03-12 RX ADMIN — Medication 1 APPLICATION(S): at 05:53

## 2022-03-12 RX ADMIN — Medication 6 UNIT(S): at 09:30

## 2022-03-12 RX ADMIN — Medication 6 UNIT(S): at 11:42

## 2022-03-12 RX ADMIN — Medication 75 MILLIGRAM(S): at 22:15

## 2022-03-12 RX ADMIN — Medication 81 MILLIGRAM(S): at 12:36

## 2022-03-12 RX ADMIN — Medication 1 APPLICATION(S): at 17:25

## 2022-03-12 NOTE — PROGRESS NOTE ADULT - ASSESSMENT
36 year old female with PMHx of Type 1 Diabetes on insulin pump, Diabetic Neuropathy, sinus tachycardia, Anxiety with syncope, pseudoseizures, HTN, Chronic back pain, Migraines who presented to the ED with one day history of NBNB emesis and diarrhea (non bloody)      ASSESSMENT & PLAN:      Viral gastroenteritis  Hematochezia  Sepsis on Admission   Hyperglycemia      C diff  ruled out  GI pcr positive for norovirus  Advance diet as tolerated  Continue IVF  Added bentyl for abd cramps  GI noted- outpatient colonoscopy & EGD in 6-8 weeks  Hyperglycemia- optimize Lantus & lispro with sliding scale coverage( possible non-compliance with diabetic diet)  Incidental Hepatic Hypodensities -MRI as outpatient & follow with GI  Continue current medical management for active chronic comorbid conditions.  DVT Prophylaxis- lovenox s/q  Supportive care including activity, diet, goals of care discussed in AM rounds.  Discussed with  / in AM rounds  Handoff: Acute inpatient management  required further as still no resolution of GI symtoms breast exam done by MD

## 2022-03-12 NOTE — PROGRESS NOTE ADULT - SUBJECTIVE AND OBJECTIVE BOX
CRISTI MONTGOMERY 36y Female  MRN#: [id number]   CODE STATUS:________    Hospital Day: 3d    Pt is currently admitted with the primary diagnosis of colitis.    SUBJECTIVE  Hospital Course    This is a 36 year old female with PMHx of Type 1 Diabetes on insulin pump, Diabetic Neuropathy, sinus tachycardia, Anxiety with syncope, pseudoseizures, HTN, Chronic back pain, Migraines who presented to the ED with one day history of NBNB emesis and diarrhea (non bloody). The patient states that it started one day ago and she had been unable to keep anything down. She notes that her insulin pump detected a finger stick over 400 and she subsequently gave herself 9 units of insulin. The patient then came to the ED.     The patient was seen and examined ,alert .oriented comfortable in bed.  Reports lower abdominal pain.                                            ----------------------------------------------------------  OBJECTIVE  PAST MEDICAL & SURGICAL HISTORY  Neuropathy  right lower extremity, vaginal    Type 1 diabetes    Degenerative disc disease, thoracic    Urinary tract infection, recurrent    Gastroesophageal reflux disease, esophagitis presence not specified    Intractable headache, unspecified chronicity pattern, unspecified headache type    Major depressive disorder with single episode, in full remission  previously treated with zyprexa, celexa and lexapro    Tremor of right hand    Tachycardia    Spinal stenosis    No significant past surgical history                                              -----------------------------------------------------------  ALLERGIES:  Ceclor (Rash)  clindamycin (Hives; Nephrotoxicity)  penicillins (Hives)                                            ------------------------------------------------------------    HOME MEDICATIONS  Home Medications:  amitriptyline 75 mg oral tablet: 1 tab(s) orally once a day (at bedtime) (09 Mar 2022 17:50)  ammonium lactate 12% topical lotion: Apply topically to affected area 2 times a day (09 Mar 2022 17:50)  aspirin 81 mg oral tablet, chewable: 1 tab(s) orally once a day (09 Mar 2022 17:50)  indomethacin 50 mg oral capsule: 1 cap(s) orally 3 times a day, As Needed (09 Mar 2022 17:50)  insulin lispro: once a day (09 Mar 2022 17:50)  Lexapro 10 mg oral tablet: 1 tab(s) orally once a day (09 Mar 2022 17:50)  metoprolol tartrate 25 mg oral tablet: 1 tab(s) orally 2 times a day (09 Mar 2022 17:50)  multivitamin: 1 tab(s) orally once a day (09 Mar 2022 17:50)                           MEDICATIONS:  STANDING MEDICATIONS  amitriptyline 75 milliGRAM(s) Oral at bedtime  ammonium lactate 12% Lotion 1 Application(s) Topical two times a day  aspirin  chewable 81 milliGRAM(s) Oral daily  dextrose 40% Gel 15 Gram(s) Oral once  dextrose 5%. 1000 milliLiter(s) IV Continuous <Continuous>  dextrose 5%. 1000 milliLiter(s) IV Continuous <Continuous>  dextrose 50% Injectable 25 Gram(s) IV Push once  dextrose 50% Injectable 12.5 Gram(s) IV Push once  dextrose 50% Injectable 25 Gram(s) IV Push once  dicyclomine 10 milliGRAM(s) Oral three times a day before meals  enoxaparin Injectable 40 milliGRAM(s) SubCutaneous every 24 hours  escitalopram 10 milliGRAM(s) Oral daily  glucagon  Injectable 1 milliGRAM(s) IntraMuscular once  insulin glargine Injectable (LANTUS) 26 Unit(s) SubCutaneous at bedtime  insulin lispro (ADMELOG) corrective regimen sliding scale   SubCutaneous three times a day before meals  insulin lispro Injectable (ADMELOG) 6 Unit(s) SubCutaneous three times a day before meals  lactated ringers. 1000 milliLiter(s) IV Continuous <Continuous>  metoprolol tartrate 25 milliGRAM(s) Oral two times a day  multivitamin 1 Tablet(s) Oral daily  pantoprazole    Tablet 40 milliGRAM(s) Oral before breakfast  senna 2 Tablet(s) Oral at bedtime    PRN MEDICATIONS  acetaminophen     Tablet .. 650 milliGRAM(s) Oral every 6 hours PRN                                            ------------------------------------------------------------  VITAL SIGNS: Last 24 Hours  T(C): 35.9 (12 Mar 2022 07:18), Max: 35.9 (12 Mar 2022 07:18)  T(F): 96.7 (12 Mar 2022 07:18), Max: 96.7 (12 Mar 2022 07:18)  HR: 109 (12 Mar 2022 07:18) (104 - 109)  BP: 142/61 (12 Mar 2022 07:18) (120/62 - 142/61)  BP(mean): --  RR: 18 (12 Mar 2022 07:18) (18 - 18)  SpO2: --                                             --------------------------------------------------------------  LABS:                        9.9    5.55  )-----------( 394      ( 12 Mar 2022 07:21 )             31.7     03-12    134<L>  |  98  |  3<L>  ----------------------------<  242<H>  4.5   |  24  |  0.5<L>    Ca    8.5      12 Mar 2022 07:21  Mg     1.8     03-12    TPro  6.4  /  Alb  3.8  /  TBili  <0.2  /  DBili  x   /  AST  33  /  ALT  44<H>  /  AlkPhos  87  03-12                GI PCR Panel, Stool (collected 10 Mar 2022 15:08)  Source: .Stool Feces  Final Report (11 Mar 2022 07:28):    Norovirus GI/GII    DETECTED by PCR    *******Please Note:*******    GI panel PCR evaluates for:    Campylobacter, Plesiomonas shigelloides, Salmonella,    Vibrio, Yersinia enterocolitica, Enteroaggregative    Escherichia coli (EAEC), Enteropathogenic E.coli (EPEC),    Enterotoxigenic E. coli (ETEC) lt/st, Shiga-like    toxin-producing E. coli (STEC) stx1/stx2,    Shigella/ Enteroinvasive E. coli (EIEC), Cryptosporidium,    Cyclospora cayetanensis, Entamoeba histolytica,    Giardia lamblia, Adenovirus F 40/41, Astrovirus,    Norovirus GI/GII, Rotavirus A, Sapovirus    Culture - Urine (collected 09 Mar 2022 13:42)  Source: Clean Catch Clean Catch (Midstream)  Final Report (11 Mar 2022 13:55):    >100,000 CFU/ml Streptococcus agalactiae (Group B) isolated    Group B streptococci are susceptible to ampicillin,    penicillin and cefazolin, but may be resistant to    erythromycin and clindamycin.    Recommendations for intrapartum prophylaxis for Group B    streptococci are penicillin or ampicillin.    Culture - Blood (collected 09 Mar 2022 11:50)  Source: .Blood Blood-Peripheral  Preliminary Report (10 Mar 2022 22:01):    No growth to date.    Culture - Blood (collected 09 Mar 2022 11:50)  Source: .Blood Blood-Peripheral  Preliminary Report (10 Mar 2022 22:01):    No growth to date.                                                    -------------------------------------------------------------  RADIOLOGY:                                            --------------------------------------------------------------    PHYSICAL EXAM:  GENERAL: NAD, lying in bed comfortably  HEAD:  Atraumatic, Normocephalic  EYES: EOMI, PERRLA, conjunctiva and sclera clear  ENT: Moist mucous membranes  CHEST/LUNG: Clear to auscultation bilaterally  HEART: Regular rate and rhythm; No murmurs, rubs, or gallops  ABDOMEN: Soft, Nontender, Nondistended.   EXTREMITIES: No clubbing, cyanosis, or edema  NERVOUS SYSTEM:  Alert & Oriented X3, speech clear. No deficits   MSK: FROM all 4 extremities, full and equal strength  SKIN: No rashes or lesions                                           --------------------------------------------------------------    ASSESSMENT & PLAN    Past medical history and hospital course     This is a 36 year old female with PMHx of Type 1 Diabetes on insulin pump, Diabetic Neuropathy, sinus tachycardia, Anxiety with syncope, pseudoseizures, HTN, Chronic back pain, Migraines who presented to the ED with one day history of NBNB emesis and diarrhea (non bloody).     # Colitis   # Sepsis on Admission   - Hematochezia   - hemodynamically stable  - CT Abdomen and Pelvis w/ IV Cont (03.09.22): Questionable cecal wall thickening, which may represent colitis.  - Given 2L in the ED; Will continue on LR at 100cc/hour  - Continue on Aztreonam and Flagyl given antibiotic intolerances  - Stool studies ordered: GI PCR, C. Diff, Lactoferrin, Calprotectin  - Ucx >100,000 CFU/ml Streptococcus agalactiae ,asymptomatic  - GI PCR positive for Norovirus  - Bcx 3/9 negative x2  - GI consult :  outpatient colonoscopy in 6-8 weeks + EGD   - clear liquid diet ,advanced to soft did not tolerate yesterday          # Type 1 Diabetes with Diabetic Neuropathy  - Insulin pump removed by patient   - Endo; Dr. Galvez consulted - Increased to Lantus to 26; Lispro 6 units TIDAC , hold if NPO or not tolerating diet  - Start on Lantus 20 and Sliding Scale  - a1c 7.8      # Incidental Hepatic Hypodensities   - outpt GI and  Outpatient MRI follow-up     # Anxiety  # Pseudoseizures  - Continue on home meds: lexapro 10mg PO daily, Amitriptyline 75mg PO qHS    # GERD  - Continue on Protonix    Activity: As tolerated   Diet: Clear Liquid  DVT ppx: Lovenox  GI ppx: Protonix  Code Status: Full Code  DISPO: Acute   Handoff : advanced diet.

## 2022-03-12 NOTE — PROGRESS NOTE ADULT - ASSESSMENT
#type 1 DM   - off insulin pump now   - POC reviewed    -  increase Lantus to   28 units at bedtime   - Continue  lispro  6 units TIDAC , hold if NPO or not tolerating diet   - Continue ISS 1:50> 150  please do not hold her basal insulin as patient has type 1 DM and can go into DKA.   on discharge she will resume her insulin pump

## 2022-03-12 NOTE — PROGRESS NOTE ADULT - SUBJECTIVE AND OBJECTIVE BOX
Progress Note:  Provider Speciality                            Hospitalist      CRISTI MONTGOMERY MRN-[id number] 36y Female     CHIEF PRESENTING COMPLAINT:  Patient is a 36y old  Female who presents with a chief complaint of Colitis (10 Mar 2022 10:13)        SUBJECTIVE:  Patient was seen and examined at bedside. Reports no active symptoms of bleeding in AM  No significant overnight events reported.     HISTORY OF PRESENTING ILLNESS:  HPI:  This is a 36 year old female with PMHx of Type 1 Diabetes on insulin pump, Diabetic Neuropathy, sinus tachycardia, Anxiety with syncope, pseudoseizures, HTN, Chronic back pain, Migraines who presented to the ED with one day history of NBNB emesis and diarrhea (non bloody). The patient states that it started one day ago and she had been unable to keep anything down. She notes that her insulin pump detected a finger stick over 400 and she subsequently gave herself 9 units of insulin. The patient then came to the ED.     In the ED initial vitals: /71, , T 101.8, Sat 100% on room air. Labs noted for mildly elevated Beta-hydroxy at 1.0, UA noted for ketones  in the urine and glucose over 1000. VBG pH 7.35 with lactate over 2.5. CT concerning for colitis. Given antibiotics and admitted to medicine.     [facility] rec reviewed with the patient who has an active list of her medications and is of sound mind.  (09 Mar 2022 17:20)        REVIEW OF SYSTEMS:  Patient denies any headache, any vision complaints, runny nose, fever, chills. Denies chest pain, shortness of breath, palpitation. Denies nausea, vomiting, abdominal pain , Denies dysuria. Denies  localized weakness in any part of the body or numbness.   At least 10 systems were reviewed in ROS. All systems reviewed  are within normal limits except for the complaints as described in Subjective.    PAST MEDICAL & SURGICAL HISTORY:  PAST MEDICAL & SURGICAL HISTORY:  Neuropathy  right lower extremity, vaginal    Type 1 diabetes    Degenerative disc disease, thoracic    Urinary tract infection, recurrent    Gastroesophageal reflux disease, esophagitis presence not specified    Intractable headache, unspecified chronicity pattern, unspecified headache type    Major depressive disorder with single episode, in full remission  previously treated with zyprexa, celexa and lexapro    Tremor of right hand    Tachycardia    Spinal stenosis    No significant past surgical history            VITAL SIGNS:  Vital Signs Last 24 Hrs  T(C): 35.9 (12 Mar 2022 07:18), Max: 35.9 (12 Mar 2022 07:18)  T(F): 96.7 (12 Mar 2022 07:18), Max: 96.7 (12 Mar 2022 07:18)  HR: 109 (12 Mar 2022 07:18) (104 - 109)  BP: 142/61 (12 Mar 2022 07:18) (120/62 - 142/61)  BP(mean): --  RR: 18 (12 Mar 2022 07:18) (18 - 18)  SpO2: --      PHYSICAL EXAMINATION:  Not in acute distress  General: No icterus  HEENT:   no JVD.  Heart: S1+S2 audible  Lungs: bilateral  moderate air entry, no wheezing, no crepitations.  Abdomen: Soft, non-tender, non-distended , no  rigidity or guarding.  CNS: Awake alert, CN  grossly intact.  Extremities:  No edema            CONSULTS:  Consultant(s) Notes Reviewed by me.   Care Discussed with Consultants/Other Providers where required.        MEDICATIONS:  MEDICATIONS  (STANDING):  amitriptyline 75 milliGRAM(s) Oral at bedtime  ammonium lactate 12% Lotion 1 Application(s) Topical two times a day  aspirin  chewable 81 milliGRAM(s) Oral daily  aztreonam  IVPB 2000 milliGRAM(s) IV Intermittent every 8 hours  dextrose 40% Gel 15 Gram(s) Oral once  dextrose 5%. 1000 milliLiter(s) (50 mL/Hr) IV Continuous <Continuous>  dextrose 5%. 1000 milliLiter(s) (100 mL/Hr) IV Continuous <Continuous>  dextrose 50% Injectable 25 Gram(s) IV Push once  dextrose 50% Injectable 12.5 Gram(s) IV Push once  dextrose 50% Injectable 25 Gram(s) IV Push once  enoxaparin Injectable 40 milliGRAM(s) SubCutaneous every 24 hours  escitalopram 10 milliGRAM(s) Oral daily  glucagon  Injectable 1 milliGRAM(s) IntraMuscular once  insulin glargine Injectable (LANTUS) 20 Unit(s) SubCutaneous at bedtime  insulin lispro (ADMELOG) corrective regimen sliding scale   SubCutaneous three times a day before meals  insulin lispro Injectable (ADMELOG) 5 Unit(s) SubCutaneous three times a day before meals  lactated ringers. 1000 milliLiter(s) (100 mL/Hr) IV Continuous <Continuous>  metoprolol tartrate 25 milliGRAM(s) Oral two times a day  metroNIDAZOLE  IVPB 500 milliGRAM(s) IV Intermittent every 8 hours  multivitamin 1 Tablet(s) Oral daily  pantoprazole    Tablet 40 milliGRAM(s) Oral before breakfast  senna 2 Tablet(s) Oral at bedtime    MEDICATIONS  (PRN):

## 2022-03-12 NOTE — PROGRESS NOTE ADULT - SUBJECTIVE AND OBJECTIVE BOX
S: FS reviewed         CAPILLARY BLOOD GLUCOSE      POCT Blood Glucose.: 317 mg/dL (12 Mar 2022 11:22)  POCT Blood Glucose.: 328 mg/dL (12 Mar 2022 09:25)  POCT Blood Glucose.: 205 mg/dL (11 Mar 2022 21:17)  POCT Blood Glucose.: 141 mg/dL (11 Mar 2022 17:52)  POCT Blood Glucose.: 48 mg/dL (11 Mar 2022 16:42)        MEDICATIONS  (STANDING):  amitriptyline 75 milliGRAM(s) Oral at bedtime  ammonium lactate 12% Lotion 1 Application(s) Topical two times a day  aspirin  chewable 81 milliGRAM(s) Oral daily  dextrose 40% Gel 15 Gram(s) Oral once  dextrose 5%. 1000 milliLiter(s) (50 mL/Hr) IV Continuous <Continuous>  dextrose 5%. 1000 milliLiter(s) (100 mL/Hr) IV Continuous <Continuous>  dextrose 50% Injectable 25 Gram(s) IV Push once  dextrose 50% Injectable 12.5 Gram(s) IV Push once  dextrose 50% Injectable 25 Gram(s) IV Push once  dicyclomine 10 milliGRAM(s) Oral three times a day before meals  enoxaparin Injectable 40 milliGRAM(s) SubCutaneous every 24 hours  escitalopram 10 milliGRAM(s) Oral daily  glucagon  Injectable 1 milliGRAM(s) IntraMuscular once  insulin glargine Injectable (LANTUS) 26 Unit(s) SubCutaneous at bedtime  insulin lispro (ADMELOG) corrective regimen sliding scale   SubCutaneous three times a day before meals  insulin lispro Injectable (ADMELOG) 6 Unit(s) SubCutaneous three times a day before meals  lactated ringers. 1000 milliLiter(s) (100 mL/Hr) IV Continuous <Continuous>  metoprolol tartrate 25 milliGRAM(s) Oral two times a day  multivitamin 1 Tablet(s) Oral daily  pantoprazole    Tablet 40 milliGRAM(s) Oral before breakfast  senna 2 Tablet(s) Oral at bedtime    MEDICATIONS  (PRN):  acetaminophen     Tablet .. 650 milliGRAM(s) Oral every 6 hours PRN Moderate Pain (4 - 6)   S: FS reviewed , continue to have diarrhea and not tolerating diet well     O: Vital Signs Last 24 Hrs  T(C): 35.9 (12 Mar 2022 16:00), Max: 35.9 (12 Mar 2022 07:18)  T(F): 96.6 (12 Mar 2022 16:00), Max: 96.7 (12 Mar 2022 07:18)  HR: 94 (12 Mar 2022 16:00) (94 - 109)  BP: 128/78 (12 Mar 2022 16:00) (120/62 - 142/61)  BP(mean): --  RR: 18 (12 Mar 2022 16:00) (18 - 18)  SpO2: --      PE: awake , alert, not in acute distress   heart : rs1s2  heart: clear  soft abdomen   LE: no edema     CAPILLARY BLOOD GLUCOSE      POCT Blood Glucose.: 317 mg/dL (12 Mar 2022 11:22)  POCT Blood Glucose.: 328 mg/dL (12 Mar 2022 09:25)  POCT Blood Glucose.: 205 mg/dL (11 Mar 2022 21:17)  POCT Blood Glucose.: 141 mg/dL (11 Mar 2022 17:52)  POCT Blood Glucose.: 48 mg/dL (11 Mar 2022 16:42)        MEDICATIONS  (STANDING):  amitriptyline 75 milliGRAM(s) Oral at bedtime  ammonium lactate 12% Lotion 1 Application(s) Topical two times a day  aspirin  chewable 81 milliGRAM(s) Oral daily  dextrose 40% Gel 15 Gram(s) Oral once  dextrose 5%. 1000 milliLiter(s) (50 mL/Hr) IV Continuous <Continuous>  dextrose 5%. 1000 milliLiter(s) (100 mL/Hr) IV Continuous <Continuous>  dextrose 50% Injectable 25 Gram(s) IV Push once  dextrose 50% Injectable 12.5 Gram(s) IV Push once  dextrose 50% Injectable 25 Gram(s) IV Push once  dicyclomine 10 milliGRAM(s) Oral three times a day before meals  enoxaparin Injectable 40 milliGRAM(s) SubCutaneous every 24 hours  escitalopram 10 milliGRAM(s) Oral daily  glucagon  Injectable 1 milliGRAM(s) IntraMuscular once  insulin glargine Injectable (LANTUS) 26 Unit(s) SubCutaneous at bedtime  insulin lispro (ADMELOG) corrective regimen sliding scale   SubCutaneous three times a day before meals  insulin lispro Injectable (ADMELOG) 6 Unit(s) SubCutaneous three times a day before meals  lactated ringers. 1000 milliLiter(s) (100 mL/Hr) IV Continuous <Continuous>  metoprolol tartrate 25 milliGRAM(s) Oral two times a day  multivitamin 1 Tablet(s) Oral daily  pantoprazole    Tablet 40 milliGRAM(s) Oral before breakfast  senna 2 Tablet(s) Oral at bedtime    MEDICATIONS  (PRN):  acetaminophen     Tablet .. 650 milliGRAM(s) Oral every 6 hours PRN Moderate Pain (4 - 6)

## 2022-03-13 LAB
ALBUMIN SERPL ELPH-MCNC: 4 G/DL — SIGNIFICANT CHANGE UP (ref 3.5–5.2)
ALP SERPL-CCNC: 105 U/L — SIGNIFICANT CHANGE UP (ref 30–115)
ALT FLD-CCNC: 55 U/L — HIGH (ref 0–41)
ANION GAP SERPL CALC-SCNC: 13 MMOL/L — SIGNIFICANT CHANGE UP (ref 7–14)
AST SERPL-CCNC: 48 U/L — HIGH (ref 0–41)
BASOPHILS # BLD AUTO: 0.02 K/UL — SIGNIFICANT CHANGE UP (ref 0–0.2)
BASOPHILS NFR BLD AUTO: 0.3 % — SIGNIFICANT CHANGE UP (ref 0–1)
BILIRUB SERPL-MCNC: <0.2 MG/DL — SIGNIFICANT CHANGE UP (ref 0.2–1.2)
BUN SERPL-MCNC: 4 MG/DL — LOW (ref 10–20)
CALCIUM SERPL-MCNC: 8.8 MG/DL — SIGNIFICANT CHANGE UP (ref 8.5–10.1)
CHLORIDE SERPL-SCNC: 101 MMOL/L — SIGNIFICANT CHANGE UP (ref 98–110)
CO2 SERPL-SCNC: 25 MMOL/L — SIGNIFICANT CHANGE UP (ref 17–32)
CREAT SERPL-MCNC: 0.5 MG/DL — LOW (ref 0.7–1.5)
EGFR: 125 ML/MIN/1.73M2 — SIGNIFICANT CHANGE UP
EOSINOPHIL # BLD AUTO: 0.15 K/UL — SIGNIFICANT CHANGE UP (ref 0–0.7)
EOSINOPHIL NFR BLD AUTO: 2.2 % — SIGNIFICANT CHANGE UP (ref 0–8)
GLUCOSE BLDC GLUCOMTR-MCNC: 193 MG/DL — HIGH (ref 70–99)
GLUCOSE BLDC GLUCOMTR-MCNC: 212 MG/DL — HIGH (ref 70–99)
GLUCOSE BLDC GLUCOMTR-MCNC: 219 MG/DL — HIGH (ref 70–99)
GLUCOSE BLDC GLUCOMTR-MCNC: 271 MG/DL — HIGH (ref 70–99)
GLUCOSE SERPL-MCNC: 244 MG/DL — HIGH (ref 70–99)
HCT VFR BLD CALC: 32.5 % — LOW (ref 37–47)
HGB BLD-MCNC: 10.3 G/DL — LOW (ref 12–16)
IMM GRANULOCYTES NFR BLD AUTO: 0.3 % — SIGNIFICANT CHANGE UP (ref 0.1–0.3)
LYMPHOCYTES # BLD AUTO: 2.66 K/UL — SIGNIFICANT CHANGE UP (ref 1.2–3.4)
LYMPHOCYTES # BLD AUTO: 38.2 % — SIGNIFICANT CHANGE UP (ref 20.5–51.1)
MAGNESIUM SERPL-MCNC: 1.7 MG/DL — LOW (ref 1.8–2.4)
MCHC RBC-ENTMCNC: 25.4 PG — LOW (ref 27–31)
MCHC RBC-ENTMCNC: 31.7 G/DL — LOW (ref 32–37)
MCV RBC AUTO: 80.2 FL — LOW (ref 81–99)
MONOCYTES # BLD AUTO: 0.59 K/UL — SIGNIFICANT CHANGE UP (ref 0.1–0.6)
MONOCYTES NFR BLD AUTO: 8.5 % — SIGNIFICANT CHANGE UP (ref 1.7–9.3)
NEUTROPHILS # BLD AUTO: 3.52 K/UL — SIGNIFICANT CHANGE UP (ref 1.4–6.5)
NEUTROPHILS NFR BLD AUTO: 50.5 % — SIGNIFICANT CHANGE UP (ref 42.2–75.2)
NRBC # BLD: 0 /100 WBCS — SIGNIFICANT CHANGE UP (ref 0–0)
PLATELET # BLD AUTO: 457 K/UL — HIGH (ref 130–400)
POTASSIUM SERPL-MCNC: 4.4 MMOL/L — SIGNIFICANT CHANGE UP (ref 3.5–5)
POTASSIUM SERPL-SCNC: 4.4 MMOL/L — SIGNIFICANT CHANGE UP (ref 3.5–5)
PROT SERPL-MCNC: 6.4 G/DL — SIGNIFICANT CHANGE UP (ref 6–8)
RBC # BLD: 4.05 M/UL — LOW (ref 4.2–5.4)
RBC # FLD: 15.9 % — HIGH (ref 11.5–14.5)
SODIUM SERPL-SCNC: 139 MMOL/L — SIGNIFICANT CHANGE UP (ref 135–146)
WBC # BLD: 6.96 K/UL — SIGNIFICANT CHANGE UP (ref 4.8–10.8)
WBC # FLD AUTO: 6.96 K/UL — SIGNIFICANT CHANGE UP (ref 4.8–10.8)

## 2022-03-13 PROCEDURE — 93010 ELECTROCARDIOGRAM REPORT: CPT

## 2022-03-13 PROCEDURE — 99233 SBSQ HOSP IP/OBS HIGH 50: CPT

## 2022-03-13 RX ORDER — METRONIDAZOLE 500 MG
500 TABLET ORAL EVERY 8 HOURS
Refills: 0 | Status: DISCONTINUED | OUTPATIENT
Start: 2022-03-13 | End: 2022-03-16

## 2022-03-13 RX ORDER — ONDANSETRON 8 MG/1
4 TABLET, FILM COATED ORAL ONCE
Refills: 0 | Status: COMPLETED | OUTPATIENT
Start: 2022-03-13 | End: 2022-03-13

## 2022-03-13 RX ORDER — AZTREONAM 2 G
2000 VIAL (EA) INJECTION EVERY 8 HOURS
Refills: 0 | Status: DISCONTINUED | OUTPATIENT
Start: 2022-03-13 | End: 2022-03-16

## 2022-03-13 RX ORDER — INSULIN GLARGINE 100 [IU]/ML
28 INJECTION, SOLUTION SUBCUTANEOUS AT BEDTIME
Refills: 0 | Status: DISCONTINUED | OUTPATIENT
Start: 2022-03-13 | End: 2022-03-15

## 2022-03-13 RX ADMIN — Medication 6 UNIT(S): at 12:40

## 2022-03-13 RX ADMIN — Medication 2: at 17:05

## 2022-03-13 RX ADMIN — ESCITALOPRAM OXALATE 10 MILLIGRAM(S): 10 TABLET, FILM COATED ORAL at 12:29

## 2022-03-13 RX ADMIN — Medication 10 MILLIGRAM(S): at 12:29

## 2022-03-13 RX ADMIN — Medication 100 MILLIGRAM(S): at 12:28

## 2022-03-13 RX ADMIN — Medication 6 UNIT(S): at 17:04

## 2022-03-13 RX ADMIN — Medication 25 MILLIGRAM(S): at 05:24

## 2022-03-13 RX ADMIN — Medication 1 TABLET(S): at 12:29

## 2022-03-13 RX ADMIN — Medication 100 MILLIGRAM(S): at 22:35

## 2022-03-13 RX ADMIN — Medication 81 MILLIGRAM(S): at 12:29

## 2022-03-13 RX ADMIN — PANTOPRAZOLE SODIUM 40 MILLIGRAM(S): 20 TABLET, DELAYED RELEASE ORAL at 05:25

## 2022-03-13 RX ADMIN — Medication 10 MILLIGRAM(S): at 08:05

## 2022-03-13 RX ADMIN — Medication 75 MILLIGRAM(S): at 22:35

## 2022-03-13 RX ADMIN — Medication 25 MILLIGRAM(S): at 18:35

## 2022-03-13 RX ADMIN — ENOXAPARIN SODIUM 40 MILLIGRAM(S): 100 INJECTION SUBCUTANEOUS at 18:35

## 2022-03-13 RX ADMIN — Medication 1: at 12:38

## 2022-03-13 RX ADMIN — ONDANSETRON 4 MILLIGRAM(S): 8 TABLET, FILM COATED ORAL at 12:28

## 2022-03-13 RX ADMIN — Medication 2: at 08:06

## 2022-03-13 RX ADMIN — SODIUM CHLORIDE 100 MILLILITER(S): 9 INJECTION, SOLUTION INTRAVENOUS at 08:09

## 2022-03-13 RX ADMIN — INSULIN GLARGINE 28 UNIT(S): 100 INJECTION, SOLUTION SUBCUTANEOUS at 22:35

## 2022-03-13 RX ADMIN — Medication 100 MILLIGRAM(S): at 14:48

## 2022-03-13 RX ADMIN — Medication 6 UNIT(S): at 08:06

## 2022-03-13 RX ADMIN — Medication 100 MILLIGRAM(S): at 14:43

## 2022-03-13 RX ADMIN — Medication 10 MILLIGRAM(S): at 17:06

## 2022-03-13 NOTE — PROGRESS NOTE ADULT - ASSESSMENT
36 year old female with PMHx of Type 1 Diabetes on insulin pump, Diabetic Neuropathy, sinus tachycardia, Anxiety with syncope, pseudoseizures, HTN, Chronic back pain, Migraines who presented to the ED with one day history of NBNB emesis and diarrhea (non bloody)      ASSESSMENT & PLAN:      Viral gastroenteritis  Hematochezia  Sepsis on Admission   Hyperglycemia      C diff  ruled out  GI pcr positive for norovirus  Advance diet as tolerated  Restarted antibiotics as she was not improving clinically  Added bentyl for abd cramps  GI noted- outpatient complaint oflonoscopy & EGD in 6-8 weeks  Hyperglycemia- optimize Lantus & lispro with sliding scale coverage( possible non-compliance with diabetic diet)  Incidental Hepatic Hypodensities -MRI as outpatient & follow with GI  Continue current medical management for active chronic comorbid conditions.  DVT Prophylaxis- lovenox s/q  Supportive care including activity, diet, goals of care discussed in AM rounds.  Discussed with  / in AM rounds  Handoff: Acute inpatient management  required further as still no resolution of GI symtoms

## 2022-03-13 NOTE — PROGRESS NOTE ADULT - SUBJECTIVE AND OBJECTIVE BOX
Progress Note:  Provider Speciality                            Hospitalist      CRISTI MONTGOMERY MRN-[id number] 36y Female     CHIEF PRESENTING COMPLAINT:  Patient is a 36y old  Female who presents with a chief complaint of Colitis (10 Mar 2022 10:13)        SUBJECTIVE:  Patient was seen and examined at bedside. Reports no active symptoms of bleeding in AM. Complaints of persistent abdominal cramps  No significant overnight events reported.     HISTORY OF PRESENTING ILLNESS:  HPI:  This is a 36 year old female with PMHx of Type 1 Diabetes on insulin pump, Diabetic Neuropathy, sinus tachycardia, Anxiety with syncope, pseudoseizures, HTN, Chronic back pain, Migraines who presented to the ED with one day history of NBNB emesis and diarrhea (non bloody). The patient states that it started one day ago and she had been unable to keep anything down. She notes that her insulin pump detected a finger stick over 400 and she subsequently gave herself 9 units of insulin. The patient then came to the ED.     In the ED initial vitals: /71, , T 101.8, Sat 100% on room air. Labs noted for mildly elevated Beta-hydroxy at 1.0, UA noted for ketones  in the urine and glucose over 1000. VBG pH 7.35 with lactate over 2.5. CT concerning for colitis. Given antibiotics and admitted to medicine.     [facility] rec reviewed with the patient who has an active list of her medications and is of sound mind.  (09 Mar 2022 17:20)        REVIEW OF SYSTEMS:  Patient denies any headache, any vision complaints, runny nose, fever, chills. Denies chest pain, shortness of breath, palpitation.  Denies dysuria. Denies  localized weakness in any part of the body or numbness.   At least 10 systems were reviewed in ROS. All systems reviewed  are within normal limits except for the complaints as described in Subjective.    PAST MEDICAL & SURGICAL HISTORY:  PAST MEDICAL & SURGICAL HISTORY:  Neuropathy  right lower extremity, vaginal    Type 1 diabetes    Degenerative disc disease, thoracic    Urinary tract infection, recurrent    Gastroesophageal reflux disease, esophagitis presence not specified    Intractable headache, unspecified chronicity pattern, unspecified headache type    Major depressive disorder with single episode, in full remission  previously treated with zyprexa, celexa and lexapro    Tremor of right hand    Tachycardia    Spinal stenosis    No significant past surgical history            VITAL SIGNS:      PHYSICAL EXAMINATION:  Not in acute distress  General: No icterus  HEENT:   no JVD.  Heart: S1+S2 audible  Lungs: bilateral  moderate air entry, no wheezing, no crepitations.  Abdomen: Soft, non-tender, non-distended , no  rigidity or guarding.  CNS: Awake alert, CN  grossly intact.  Extremities:  No edema            CONSULTS:  Consultant(s) Notes Reviewed by me.   Care Discussed with Consultants/Other Providers where required.        MEDICATIONS:  MEDICATIONS  (STANDING):  amitriptyline 75 milliGRAM(s) Oral at bedtime  ammonium lactate 12% Lotion 1 Application(s) Topical two times a day  aspirin  chewable 81 milliGRAM(s) Oral daily  aztreonam  IVPB 2000 milliGRAM(s) IV Intermittent every 8 hours  dextrose 40% Gel 15 Gram(s) Oral once  dextrose 5%. 1000 milliLiter(s) (50 mL/Hr) IV Continuous <Continuous>  dextrose 5%. 1000 milliLiter(s) (100 mL/Hr) IV Continuous <Continuous>  dextrose 50% Injectable 25 Gram(s) IV Push once  dextrose 50% Injectable 12.5 Gram(s) IV Push once  dextrose 50% Injectable 25 Gram(s) IV Push once  enoxaparin Injectable 40 milliGRAM(s) SubCutaneous every 24 hours  escitalopram 10 milliGRAM(s) Oral daily  glucagon  Injectable 1 milliGRAM(s) IntraMuscular once  insulin glargine Injectable (LANTUS) 20 Unit(s) SubCutaneous at bedtime  insulin lispro (ADMELOG) corrective regimen sliding scale   SubCutaneous three times a day before meals  insulin lispro Injectable (ADMELOG) 5 Unit(s) SubCutaneous three times a day before meals  lactated ringers. 1000 milliLiter(s) (100 mL/Hr) IV Continuous <Continuous>  metoprolol tartrate 25 milliGRAM(s) Oral two times a day  metroNIDAZOLE  IVPB 500 milliGRAM(s) IV Intermittent every 8 hours  multivitamin 1 Tablet(s) Oral daily  pantoprazole    Tablet 40 milliGRAM(s) Oral before breakfast  senna 2 Tablet(s) Oral at bedtime    MEDICATIONS  (PRN):

## 2022-03-14 LAB
ALBUMIN SERPL ELPH-MCNC: 3.7 G/DL — SIGNIFICANT CHANGE UP (ref 3.5–5.2)
ALP SERPL-CCNC: 79 U/L — SIGNIFICANT CHANGE UP (ref 30–115)
ALT FLD-CCNC: 70 U/L — HIGH (ref 0–41)
ANION GAP SERPL CALC-SCNC: 10 MMOL/L — SIGNIFICANT CHANGE UP (ref 7–14)
AST SERPL-CCNC: 67 U/L — HIGH (ref 0–41)
BASOPHILS # BLD AUTO: 0.02 K/UL — SIGNIFICANT CHANGE UP (ref 0–0.2)
BASOPHILS NFR BLD AUTO: 0.3 % — SIGNIFICANT CHANGE UP (ref 0–1)
BILIRUB SERPL-MCNC: <0.2 MG/DL — SIGNIFICANT CHANGE UP (ref 0.2–1.2)
BUN SERPL-MCNC: 4 MG/DL — LOW (ref 10–20)
CALCIUM SERPL-MCNC: 8.6 MG/DL — SIGNIFICANT CHANGE UP (ref 8.5–10.1)
CHLORIDE SERPL-SCNC: 102 MMOL/L — SIGNIFICANT CHANGE UP (ref 98–110)
CO2 SERPL-SCNC: 26 MMOL/L — SIGNIFICANT CHANGE UP (ref 17–32)
CREAT SERPL-MCNC: <0.5 MG/DL — LOW (ref 0.7–1.5)
CULTURE RESULTS: SIGNIFICANT CHANGE UP
CULTURE RESULTS: SIGNIFICANT CHANGE UP
EGFR: 131 ML/MIN/1.73M2 — SIGNIFICANT CHANGE UP
EOSINOPHIL # BLD AUTO: 0.21 K/UL — SIGNIFICANT CHANGE UP (ref 0–0.7)
EOSINOPHIL NFR BLD AUTO: 3.5 % — SIGNIFICANT CHANGE UP (ref 0–8)
GLUCOSE BLDC GLUCOMTR-MCNC: 129 MG/DL — HIGH (ref 70–99)
GLUCOSE BLDC GLUCOMTR-MCNC: 136 MG/DL — HIGH (ref 70–99)
GLUCOSE BLDC GLUCOMTR-MCNC: 141 MG/DL — HIGH (ref 70–99)
GLUCOSE BLDC GLUCOMTR-MCNC: 245 MG/DL — HIGH (ref 70–99)
GLUCOSE SERPL-MCNC: 138 MG/DL — HIGH (ref 70–99)
HCT VFR BLD CALC: 29.8 % — LOW (ref 37–47)
HGB BLD-MCNC: 9.4 G/DL — LOW (ref 12–16)
IMM GRANULOCYTES NFR BLD AUTO: 0.3 % — SIGNIFICANT CHANGE UP (ref 0.1–0.3)
LYMPHOCYTES # BLD AUTO: 2.42 K/UL — SIGNIFICANT CHANGE UP (ref 1.2–3.4)
LYMPHOCYTES # BLD AUTO: 40.5 % — SIGNIFICANT CHANGE UP (ref 20.5–51.1)
MAGNESIUM SERPL-MCNC: 1.5 MG/DL — LOW (ref 1.8–2.4)
MCHC RBC-ENTMCNC: 25 PG — LOW (ref 27–31)
MCHC RBC-ENTMCNC: 31.5 G/DL — LOW (ref 32–37)
MCV RBC AUTO: 79.3 FL — LOW (ref 81–99)
MONOCYTES # BLD AUTO: 0.7 K/UL — HIGH (ref 0.1–0.6)
MONOCYTES NFR BLD AUTO: 11.7 % — HIGH (ref 1.7–9.3)
NEUTROPHILS # BLD AUTO: 2.61 K/UL — SIGNIFICANT CHANGE UP (ref 1.4–6.5)
NEUTROPHILS NFR BLD AUTO: 43.7 % — SIGNIFICANT CHANGE UP (ref 42.2–75.2)
NRBC # BLD: 0 /100 WBCS — SIGNIFICANT CHANGE UP (ref 0–0)
PLATELET # BLD AUTO: 382 K/UL — SIGNIFICANT CHANGE UP (ref 130–400)
POTASSIUM SERPL-MCNC: 4 MMOL/L — SIGNIFICANT CHANGE UP (ref 3.5–5)
POTASSIUM SERPL-SCNC: 4 MMOL/L — SIGNIFICANT CHANGE UP (ref 3.5–5)
PROT SERPL-MCNC: 6.1 G/DL — SIGNIFICANT CHANGE UP (ref 6–8)
RBC # BLD: 3.76 M/UL — LOW (ref 4.2–5.4)
RBC # FLD: 15.9 % — HIGH (ref 11.5–14.5)
SODIUM SERPL-SCNC: 138 MMOL/L — SIGNIFICANT CHANGE UP (ref 135–146)
SPECIMEN SOURCE: SIGNIFICANT CHANGE UP
SPECIMEN SOURCE: SIGNIFICANT CHANGE UP
TROPONIN T SERPL-MCNC: <0.01 NG/ML — SIGNIFICANT CHANGE UP
WBC # BLD: 5.98 K/UL — SIGNIFICANT CHANGE UP (ref 4.8–10.8)
WBC # FLD AUTO: 5.98 K/UL — SIGNIFICANT CHANGE UP (ref 4.8–10.8)

## 2022-03-14 PROCEDURE — 99233 SBSQ HOSP IP/OBS HIGH 50: CPT

## 2022-03-14 PROCEDURE — 93010 ELECTROCARDIOGRAM REPORT: CPT

## 2022-03-14 RX ORDER — MAGNESIUM SULFATE 500 MG/ML
2 VIAL (ML) INJECTION
Refills: 0 | Status: COMPLETED | OUTPATIENT
Start: 2022-03-14 | End: 2022-03-14

## 2022-03-14 RX ORDER — OXYCODONE AND ACETAMINOPHEN 5; 325 MG/1; MG/1
2 TABLET ORAL EVERY 6 HOURS
Refills: 0 | Status: DISCONTINUED | OUTPATIENT
Start: 2022-03-14 | End: 2022-03-16

## 2022-03-14 RX ADMIN — Medication 100 MILLIGRAM(S): at 16:28

## 2022-03-14 RX ADMIN — Medication 1 APPLICATION(S): at 06:08

## 2022-03-14 RX ADMIN — Medication 1 TABLET(S): at 12:07

## 2022-03-14 RX ADMIN — Medication 6 UNIT(S): at 16:41

## 2022-03-14 RX ADMIN — ENOXAPARIN SODIUM 40 MILLIGRAM(S): 100 INJECTION SUBCUTANEOUS at 17:48

## 2022-03-14 RX ADMIN — Medication 25 GRAM(S): at 09:52

## 2022-03-14 RX ADMIN — Medication 6 UNIT(S): at 08:42

## 2022-03-14 RX ADMIN — Medication 81 MILLIGRAM(S): at 12:07

## 2022-03-14 RX ADMIN — PANTOPRAZOLE SODIUM 40 MILLIGRAM(S): 20 TABLET, DELAYED RELEASE ORAL at 08:42

## 2022-03-14 RX ADMIN — Medication 100 MILLIGRAM(S): at 23:12

## 2022-03-14 RX ADMIN — Medication 10 MILLIGRAM(S): at 07:14

## 2022-03-14 RX ADMIN — Medication 25 MILLIGRAM(S): at 17:48

## 2022-03-14 RX ADMIN — Medication 650 MILLIGRAM(S): at 06:05

## 2022-03-14 RX ADMIN — Medication 100 MILLIGRAM(S): at 07:14

## 2022-03-14 RX ADMIN — Medication 10 MILLIGRAM(S): at 16:28

## 2022-03-14 RX ADMIN — Medication 100 MILLIGRAM(S): at 21:36

## 2022-03-14 RX ADMIN — Medication 75 MILLIGRAM(S): at 21:37

## 2022-03-14 RX ADMIN — SENNA PLUS 2 TABLET(S): 8.6 TABLET ORAL at 21:36

## 2022-03-14 RX ADMIN — Medication 100 MILLIGRAM(S): at 17:48

## 2022-03-14 RX ADMIN — Medication 100 MILLIGRAM(S): at 06:05

## 2022-03-14 RX ADMIN — Medication 1 APPLICATION(S): at 17:48

## 2022-03-14 RX ADMIN — Medication 10 MILLIGRAM(S): at 12:08

## 2022-03-14 RX ADMIN — Medication 6 UNIT(S): at 12:08

## 2022-03-14 RX ADMIN — OXYCODONE AND ACETAMINOPHEN 2 TABLET(S): 5; 325 TABLET ORAL at 16:40

## 2022-03-14 RX ADMIN — ESCITALOPRAM OXALATE 10 MILLIGRAM(S): 10 TABLET, FILM COATED ORAL at 12:07

## 2022-03-14 RX ADMIN — Medication 25 MILLIGRAM(S): at 06:07

## 2022-03-14 RX ADMIN — INSULIN GLARGINE 28 UNIT(S): 100 INJECTION, SOLUTION SUBCUTANEOUS at 21:43

## 2022-03-14 RX ADMIN — Medication 25 GRAM(S): at 12:42

## 2022-03-14 NOTE — PROGRESS NOTE ADULT - SUBJECTIVE AND OBJECTIVE BOX
Progress Note:  Provider Speciality                            Hospitalist      CRISTI MONTGOMERY MRN-[id number] 36y Female     CHIEF PRESENTING COMPLAINT:  Patient is a 36y old  Female who presents with a chief complaint of Colitis (10 Mar 2022 10:13)        SUBJECTIVE:  Patient was seen and examined at bedside. Reports no active symptoms of bleeding in AM. Complaints of intermittent abdominal cramps  No significant overnight events reported.     HISTORY OF PRESENTING ILLNESS:  HPI:  This is a 36 year old female with PMHx of Type 1 Diabetes on insulin pump, Diabetic Neuropathy, sinus tachycardia, Anxiety with syncope, pseudoseizures, HTN, Chronic back pain, Migraines who presented to the ED with one day history of NBNB emesis and diarrhea (non bloody). The patient states that it started one day ago and she had been unable to keep anything down. She notes that her insulin pump detected a finger stick over 400 and she subsequently gave herself 9 units of insulin. The patient then came to the ED.     In the ED initial vitals: /71, , T 101.8, Sat 100% on room air. Labs noted for mildly elevated Beta-hydroxy at 1.0, UA noted for ketones  in the urine and glucose over 1000. VBG pH 7.35 with lactate over 2.5. CT concerning for colitis. Given antibiotics and admitted to medicine.     [facility] rec reviewed with the patient who has an active list of her medications and is of sound mind.  (09 Mar 2022 17:20)        REVIEW OF SYSTEMS:  Patient denies any headache, any vision complaints, runny nose, fever, chills. Denies chest pain, shortness of breath, palpitation.  Denies dysuria. Denies  localized weakness in any part of the body or numbness.   At least 10 systems were reviewed in ROS. All systems reviewed  are within normal limits except for the complaints as described in Subjective.    PAST MEDICAL & SURGICAL HISTORY:  PAST MEDICAL & SURGICAL HISTORY:  Neuropathy  right lower extremity, vaginal    Type 1 diabetes    Degenerative disc disease, thoracic    Urinary tract infection, recurrent    Gastroesophageal reflux disease, esophagitis presence not specified    Intractable headache, unspecified chronicity pattern, unspecified headache type    Major depressive disorder with single episode, in full remission  previously treated with zyprexa, celexa and lexapro    Tremor of right hand    Tachycardia    Spinal stenosis    No significant past surgical history            VITAL SIGNS:  Vital Signs Last 24 Hrs  T(C): 36.8 (14 Mar 2022 08:00), Max: 36.8 (14 Mar 2022 08:00)  T(F): 98.2 (14 Mar 2022 08:00), Max: 98.2 (14 Mar 2022 08:00)  HR: 87 (14 Mar 2022 08:00) (87 - 101)  BP: 119/61 (14 Mar 2022 08:00) (106/58 - 134/67)  BP(mean): --  RR: 18 (14 Mar 2022 08:00) (18 - 19)  SpO2: 98% (14 Mar 2022 08:00) (98% - 98%)      PHYSICAL EXAMINATION:  Not in acute distress  General: No icterus  HEENT:   no JVD.  Heart: S1+S2 audible  Lungs: bilateral  moderate air entry, no wheezing, no crepitations.  Abdomen: Soft, non-tender, non-distended , no  rigidity or guarding.  CNS: Awake alert, CN  grossly intact.  Extremities:  No edema            CONSULTS:  Consultant(s) Notes Reviewed by me.   Care Discussed with Consultants/Other Providers where required.        MEDICATIONS:  MEDICATIONS  (STANDING):  amitriptyline 75 milliGRAM(s) Oral at bedtime  ammonium lactate 12% Lotion 1 Application(s) Topical two times a day  aspirin  chewable 81 milliGRAM(s) Oral daily  aztreonam  IVPB 2000 milliGRAM(s) IV Intermittent every 8 hours  dextrose 40% Gel 15 Gram(s) Oral once  dextrose 5%. 1000 milliLiter(s) (50 mL/Hr) IV Continuous <Continuous>  dextrose 5%. 1000 milliLiter(s) (100 mL/Hr) IV Continuous <Continuous>  dextrose 50% Injectable 25 Gram(s) IV Push once  dextrose 50% Injectable 12.5 Gram(s) IV Push once  dextrose 50% Injectable 25 Gram(s) IV Push once  enoxaparin Injectable 40 milliGRAM(s) SubCutaneous every 24 hours  escitalopram 10 milliGRAM(s) Oral daily  glucagon  Injectable 1 milliGRAM(s) IntraMuscular once  insulin glargine Injectable (LANTUS) 20 Unit(s) SubCutaneous at bedtime  insulin lispro (ADMELOG) corrective regimen sliding scale   SubCutaneous three times a day before meals  insulin lispro Injectable (ADMELOG) 5 Unit(s) SubCutaneous three times a day before meals  lactated ringers. 1000 milliLiter(s) (100 mL/Hr) IV Continuous <Continuous>  metoprolol tartrate 25 milliGRAM(s) Oral two times a day  metroNIDAZOLE  IVPB 500 milliGRAM(s) IV Intermittent every 8 hours  multivitamin 1 Tablet(s) Oral daily  pantoprazole    Tablet 40 milliGRAM(s) Oral before breakfast  senna 2 Tablet(s) Oral at bedtime    MEDICATIONS  (PRN):

## 2022-03-14 NOTE — PROGRESS NOTE ADULT - ASSESSMENT
This is a 36 year old female with PMHx of Type 1 Diabetes on insulin pump, Diabetic Neuropathy, sinus tachycardia, Anxiety with syncope, pseudoseizures, HTN, Chronic back pain, Migraines who presented to the ED with one day history of NBNB emesis and diarrhea (non bloody).     # Colitis   # Sepsis on Admission   - Hematochezia   - hemodynamically stable  - CT Abdomen and Pelvis w/ IV Cont (03.09.22): Questionable cecal wall thickening, which may represent colitis.  - Abx Aztreonam and Flagyl   - Stool studies ordered: GI PCR, C. Diff, Lactoferrin, Calprotectin  - Ucx >100,000 CFU/ml Streptococcus agalactiae ,asymptomatic  - GI PCR positive for Norovirus  - Bcx 3/9 negative x2  - GI consult :  outpatient colonoscopy in 6-8 weeks + EGD   - clear liquid diet ,advanced to soft did not tolerate yesterday          # Type 1 Diabetes with Diabetic Neuropathy  - Insulin pump removed by patient   - Endo; Dr. Galvez consulted - Increased to Lantus to 26; Lispro 6 units TIDAC , hold if NPO or not tolerating diet  - Start on Lantus 20 and Sliding Scale  - a1c 7.8      # Incidental Hepatic Hypodensities   - outpt GI and  Outpatient MRI follow-up     # Anxiety  # Pseudoseizures  - Continue on home meds: lexapro 10mg PO daily, Amitriptyline 75mg PO qHS    # GERD  - Continue on Protonix    Activity: As tolerated   Diet: Clear Liquid  DVT ppx: Lovenox  GI ppx: Protonix  Code Status: Full Code  DISPO: Acute   Handoff : advanced diet. This is a 36 year old female with PMHx of Type 1 Diabetes on insulin pump, Diabetic Neuropathy, sinus tachycardia, Anxiety with syncope, pseudoseizures, HTN, Chronic back pain, Migraines who presented to the ED with one day history of NBNB emesis and diarrhea (non bloody).     # Colitis   # Norovirus  - septic on admission   - currently afebrile and WBC WNL     - CT Abdomen and Pelvis w/ IV Cont (03.09.22): Questionable cecal wall thickening, which may represent colitis.  - Abx c/w Aztreonam and Flagyl  - GI c/s: given  that this is the second episode of acute colitis--> r/o infectious vs IBD etiology --> outpatient colonoscopy in 6-8 weeks + EGD   - GI PCR +ve Norovirus   - Calprotectin 70   - C diff negative   - Bcx 3/9 NGTD x2     # Type 1 Diabetes with Diabetic Neuropathy  - Insulin pump removed by patient   - Endo; Dr. Galvez consulted   - lantus 28 and lispro 6 TID       # Incidental Hepatic Hypodensities   - outpt GI and  Outpatient MRI follow-up     # Anxiety  # Pseudoseizures  - Continue on home meds: lexapro 10mg PO daily, Amitriptyline 75mg PO qHS    # GERD  - Continue on Protonix    Activity: As tolerated   Diet: soft and bite sized   DVT ppx: Lovenox  GI ppx: Protonix  Code Status: Full Code  DISPO: anticipate for tomorrow

## 2022-03-14 NOTE — PROGRESS NOTE ADULT - SUBJECTIVE AND OBJECTIVE BOX
CRISTI MONTGOMERY 36y Female      Patient is a 36y old  Female who presents with a chief complaint of Colitis (14 Mar 2022 13:38)        INTERVAL HPI/OVERNIGHT EVENTS: Pt with chest pain overnight. EKG and trops negative. Pt still complaining of nausea and abd pain.       PHYSICAL EXAM:  GENERAL: NAD  HEAD:  Normocephalic  EYES:  conjunctiva and sclera clear  ENMT: Moist mucous membranes  NECK: Supple  NERVOUS SYSTEM:  Alert, awake  CHEST/LUNG: Good air exchange bilaterally, no wheeze  HEART: Regular rate and rhythm        Vital Signs Last 24 Hrs  T(C): 36.8 (14 Mar 2022 08:00), Max: 36.8 (14 Mar 2022 08:00)  T(F): 98.2 (14 Mar 2022 08:00), Max: 98.2 (14 Mar 2022 08:00)  HR: 87 (14 Mar 2022 08:00) (87 - 101)  BP: 119/61 (14 Mar 2022 08:00) (106/58 - 134/67)  BP(mean): --  RR: 18 (14 Mar 2022 08:00) (18 - 19)  SpO2: 98% (14 Mar 2022 08:00) (98% - 98%)      Consultant(s) Notes Reviewed:  [X] YES  [ ] NO  Care Discussed with Consultants/Other Providers [X] YES  [ ] NO  Imaging Personally Reviewed:  [X] YES  [ ] NO      LABS:                        9.4    5.98  )-----------( 382      ( 14 Mar 2022 04:30 )             29.8     03-14    138  |  102  |  4<L>  ----------------------------<  138<H>  4.0   |  26  |  <0.5<L>    Ca    8.6      14 Mar 2022 04:30  Mg     1.5     03-14    TPro  6.1  /  Alb  3.7  /  TBili  <0.2  /  DBili  x   /  AST  67<H>  /  ALT  70<H>  /  AlkPhos  79  03-14          CAPILLARY BLOOD GLUCOSE      POCT Blood Glucose.: 141 mg/dL (14 Mar 2022 12:05)  POCT Blood Glucose.: 136 mg/dL (14 Mar 2022 08:06)  POCT Blood Glucose.: 271 mg/dL (13 Mar 2022 21:45)  POCT Blood Glucose.: 212 mg/dL (13 Mar 2022 16:36)

## 2022-03-14 NOTE — PROGRESS NOTE ADULT - ASSESSMENT
36 year old female with PMHx of Type 1 Diabetes on insulin pump, Diabetic Neuropathy, sinus tachycardia, Anxiety with syncope, pseudoseizures, HTN, Chronic back pain, Migraines who presented to the ED with one day history of NBNB emesis and diarrhea (non bloody)      ASSESSMENT & PLAN:      Viral gastroenteritis  Hematochezia  Sepsis on Admission   Hyperglycemia      C diff  ruled out  GI pcr positive for norovirus  Advance diet as tolerated  Restarted antibiotics as she was not improving clinically  Added bentyl for abd cramps  GI noted- outpatient complaint oflonoscopy & EGD in 6-8 weeks  Hyperglycemia- optimize Lantus & lispro with sliding scale coverage( possible non-compliance with diabetic diet)  Incidental Hepatic Hypodensities -MRI as outpatient & follow with GI  Continue current medical management for active chronic comorbid conditions.  DVT Prophylaxis- lovenox s/q  Supportive care including activity, diet, goals of care discussed in AM rounds.  Discussed with  / in AM rounds  Handoff: PO intake still minimal due to persistent GI symptoms including nausea & abd cramps & diarrhoea . Anticipated for discharge tomorrow

## 2022-03-14 NOTE — CHART NOTE - NSCHARTNOTEFT_GEN_A_CORE
Called by RN patient with chest pain onset 1 hour ago.  Assessed patient at bedside. Reporting midsternal "squeezing" chest pain, non-radiating, non-exertional, non-pleuritic, 5/10 woke her up from sleep. Associated with SOB which has now resolved and fast palpitations, no nausea, vomiting, diaphoresis.  Reports she has had similar episode 2 days ago as well as a few months ago. Evaluated by cardiologist, had a stress test a few months ago which was normal. Also states has had a loop recorder for palpitations, results pending. Unknown family history (adopted), risk factors include HTN and T1DM.   On my assessment, VS okay - slightly tachy. Lower sternum noted tender to palpation, however feels like a dull ache different from her "squeezing chest pain". Heart RRR no murmurs, Lungs CTA b/l, abdomen soft non-tender, no peripheral edema, otherwise benign exam.    Impression:  Atypical chest pain, acute - uncertain etiology however cannot exclude ACS    Plan:  12-lead EKG  Troponin in AM  Tylenol for pain, avoiding nsaids given hematochezia documented in chart

## 2022-03-15 ENCOUNTER — APPOINTMENT (OUTPATIENT)
Dept: PSYCHIATRY | Facility: CLINIC | Age: 37
End: 2022-03-15

## 2022-03-15 LAB
ALBUMIN SERPL ELPH-MCNC: 3.7 G/DL — SIGNIFICANT CHANGE UP (ref 3.5–5.2)
ALP SERPL-CCNC: 93 U/L — SIGNIFICANT CHANGE UP (ref 30–115)
ALT FLD-CCNC: 76 U/L — HIGH (ref 0–41)
ANION GAP SERPL CALC-SCNC: 9 MMOL/L — SIGNIFICANT CHANGE UP (ref 7–14)
AST SERPL-CCNC: 58 U/L — HIGH (ref 0–41)
BASOPHILS # BLD AUTO: 0.04 K/UL — SIGNIFICANT CHANGE UP (ref 0–0.2)
BASOPHILS NFR BLD AUTO: 0.7 % — SIGNIFICANT CHANGE UP (ref 0–1)
BILIRUB SERPL-MCNC: <0.2 MG/DL — SIGNIFICANT CHANGE UP (ref 0.2–1.2)
BUN SERPL-MCNC: 4 MG/DL — LOW (ref 10–20)
CALCIUM SERPL-MCNC: 8.3 MG/DL — LOW (ref 8.5–10.1)
CHLORIDE SERPL-SCNC: 104 MMOL/L — SIGNIFICANT CHANGE UP (ref 98–110)
CO2 SERPL-SCNC: 25 MMOL/L — SIGNIFICANT CHANGE UP (ref 17–32)
CREAT SERPL-MCNC: 0.6 MG/DL — LOW (ref 0.7–1.5)
EGFR: 119 ML/MIN/1.73M2 — SIGNIFICANT CHANGE UP
EOSINOPHIL # BLD AUTO: 0.31 K/UL — SIGNIFICANT CHANGE UP (ref 0–0.7)
EOSINOPHIL NFR BLD AUTO: 5.2 % — SIGNIFICANT CHANGE UP (ref 0–8)
GLUCOSE BLDC GLUCOMTR-MCNC: 196 MG/DL — HIGH (ref 70–99)
GLUCOSE BLDC GLUCOMTR-MCNC: 214 MG/DL — HIGH (ref 70–99)
GLUCOSE BLDC GLUCOMTR-MCNC: 221 MG/DL — HIGH (ref 70–99)
GLUCOSE BLDC GLUCOMTR-MCNC: 234 MG/DL — HIGH (ref 70–99)
GLUCOSE SERPL-MCNC: 246 MG/DL — HIGH (ref 70–99)
HCT VFR BLD CALC: 30.3 % — LOW (ref 37–47)
HGB BLD-MCNC: 9.4 G/DL — LOW (ref 12–16)
IMM GRANULOCYTES NFR BLD AUTO: 0.7 % — HIGH (ref 0.1–0.3)
LYMPHOCYTES # BLD AUTO: 2.14 K/UL — SIGNIFICANT CHANGE UP (ref 1.2–3.4)
LYMPHOCYTES # BLD AUTO: 35.8 % — SIGNIFICANT CHANGE UP (ref 20.5–51.1)
MAGNESIUM SERPL-MCNC: 2 MG/DL — SIGNIFICANT CHANGE UP (ref 1.8–2.4)
MCHC RBC-ENTMCNC: 24.7 PG — LOW (ref 27–31)
MCHC RBC-ENTMCNC: 31 G/DL — LOW (ref 32–37)
MCV RBC AUTO: 79.5 FL — LOW (ref 81–99)
MONOCYTES # BLD AUTO: 0.65 K/UL — HIGH (ref 0.1–0.6)
MONOCYTES NFR BLD AUTO: 10.9 % — HIGH (ref 1.7–9.3)
NEUTROPHILS # BLD AUTO: 2.79 K/UL — SIGNIFICANT CHANGE UP (ref 1.4–6.5)
NEUTROPHILS NFR BLD AUTO: 46.7 % — SIGNIFICANT CHANGE UP (ref 42.2–75.2)
NRBC # BLD: 0 /100 WBCS — SIGNIFICANT CHANGE UP (ref 0–0)
PLATELET # BLD AUTO: 379 K/UL — SIGNIFICANT CHANGE UP (ref 130–400)
POTASSIUM SERPL-MCNC: 5 MMOL/L — SIGNIFICANT CHANGE UP (ref 3.5–5)
POTASSIUM SERPL-SCNC: 5 MMOL/L — SIGNIFICANT CHANGE UP (ref 3.5–5)
PROT SERPL-MCNC: 5.9 G/DL — LOW (ref 6–8)
RBC # BLD: 3.81 M/UL — LOW (ref 4.2–5.4)
RBC # FLD: 15.9 % — HIGH (ref 11.5–14.5)
SODIUM SERPL-SCNC: 138 MMOL/L — SIGNIFICANT CHANGE UP (ref 135–146)
WBC # BLD: 5.97 K/UL — SIGNIFICANT CHANGE UP (ref 4.8–10.8)
WBC # FLD AUTO: 5.97 K/UL — SIGNIFICANT CHANGE UP (ref 4.8–10.8)

## 2022-03-15 PROCEDURE — 99233 SBSQ HOSP IP/OBS HIGH 50: CPT

## 2022-03-15 RX ORDER — INSULIN GLARGINE 100 [IU]/ML
30 INJECTION, SOLUTION SUBCUTANEOUS AT BEDTIME
Refills: 0 | Status: DISCONTINUED | OUTPATIENT
Start: 2022-03-15 | End: 2022-03-16

## 2022-03-15 RX ORDER — INSULIN LISPRO 100/ML
10 VIAL (ML) SUBCUTANEOUS
Refills: 0 | Status: DISCONTINUED | OUTPATIENT
Start: 2022-03-15 | End: 2022-03-16

## 2022-03-15 RX ADMIN — Medication 10 MILLIGRAM(S): at 11:25

## 2022-03-15 RX ADMIN — Medication 2: at 16:48

## 2022-03-15 RX ADMIN — Medication 2: at 08:36

## 2022-03-15 RX ADMIN — ESCITALOPRAM OXALATE 10 MILLIGRAM(S): 10 TABLET, FILM COATED ORAL at 11:25

## 2022-03-15 RX ADMIN — Medication 1 APPLICATION(S): at 05:24

## 2022-03-15 RX ADMIN — OXYCODONE AND ACETAMINOPHEN 2 TABLET(S): 5; 325 TABLET ORAL at 22:18

## 2022-03-15 RX ADMIN — Medication 25 MILLIGRAM(S): at 17:16

## 2022-03-15 RX ADMIN — Medication 100 MILLIGRAM(S): at 13:05

## 2022-03-15 RX ADMIN — Medication 1 TABLET(S): at 11:25

## 2022-03-15 RX ADMIN — INSULIN GLARGINE 30 UNIT(S): 100 INJECTION, SOLUTION SUBCUTANEOUS at 22:01

## 2022-03-15 RX ADMIN — Medication 100 MILLIGRAM(S): at 21:59

## 2022-03-15 RX ADMIN — Medication 100 MILLIGRAM(S): at 05:22

## 2022-03-15 RX ADMIN — Medication 25 MILLIGRAM(S): at 05:22

## 2022-03-15 RX ADMIN — Medication 100 MILLIGRAM(S): at 06:36

## 2022-03-15 RX ADMIN — Medication 6 UNIT(S): at 08:37

## 2022-03-15 RX ADMIN — Medication 10 MILLIGRAM(S): at 08:38

## 2022-03-15 RX ADMIN — Medication 75 MILLIGRAM(S): at 22:00

## 2022-03-15 RX ADMIN — SENNA PLUS 2 TABLET(S): 8.6 TABLET ORAL at 22:00

## 2022-03-15 RX ADMIN — PANTOPRAZOLE SODIUM 40 MILLIGRAM(S): 20 TABLET, DELAYED RELEASE ORAL at 08:36

## 2022-03-15 RX ADMIN — Medication 10 UNIT(S): at 16:49

## 2022-03-15 RX ADMIN — ENOXAPARIN SODIUM 40 MILLIGRAM(S): 100 INJECTION SUBCUTANEOUS at 17:17

## 2022-03-15 RX ADMIN — Medication 6 UNIT(S): at 11:26

## 2022-03-15 RX ADMIN — Medication 1 APPLICATION(S): at 17:18

## 2022-03-15 RX ADMIN — Medication 81 MILLIGRAM(S): at 11:25

## 2022-03-15 RX ADMIN — Medication 100 MILLIGRAM(S): at 23:30

## 2022-03-15 RX ADMIN — Medication 1: at 11:26

## 2022-03-15 RX ADMIN — Medication 10 MILLIGRAM(S): at 17:16

## 2022-03-15 NOTE — PROGRESS NOTE ADULT - SUBJECTIVE AND OBJECTIVE BOX
CRISTI MONTGOMERY 36y Female      Patient is a 36y old  Female who presents with a chief complaint of Colitis (14 Mar 2022 13:38)        INTERVAL HPI/OVERNIGHT EVENTS: Pt with chest pain overnight. EKG and trops negative. Pt still complaining of nausea and abd pain.       PHYSICAL EXAM:  GENERAL: NAD  HEAD:  Normocephalic  EYES:  conjunctiva and sclera clear  ENMT: Moist mucous membranes  NECK: Supple  NERVOUS SYSTEM:  Alert, awake  CHEST/LUNG: Good air exchange bilaterally, no wheeze  HEART: Regular rate and rhythm        Vital Signs Last 24 Hrs  T(C): 36.8 (14 Mar 2022 08:00), Max: 36.8 (14 Mar 2022 08:00)  T(F): 98.2 (14 Mar 2022 08:00), Max: 98.2 (14 Mar 2022 08:00)  HR: 87 (14 Mar 2022 08:00) (87 - 101)  BP: 119/61 (14 Mar 2022 08:00) (106/58 - 134/67)  BP(mean): --  RR: 18 (14 Mar 2022 08:00) (18 - 19)  SpO2: 98% (14 Mar 2022 08:00) (98% - 98%)      Consultant(s) Notes Reviewed:  [X] YES  [ ] NO  Care Discussed with Consultants/Other Providers [X] YES  [ ] NO  Imaging Personally Reviewed:  [X] YES  [ ] NO      LABS:                        9.4    5.98  )-----------( 382      ( 14 Mar 2022 04:30 )             29.8     03-14    138  |  102  |  4<L>  ----------------------------<  138<H>  4.0   |  26  |  <0.5<L>    Ca    8.6      14 Mar 2022 04:30  Mg     1.5     03-14    TPro  6.1  /  Alb  3.7  /  TBili  <0.2  /  DBili  x   /  AST  67<H>  /  ALT  70<H>  /  AlkPhos  79  03-14          CAPILLARY BLOOD GLUCOSE      POCT Blood Glucose.: 141 mg/dL (14 Mar 2022 12:05)  POCT Blood Glucose.: 136 mg/dL (14 Mar 2022 08:06)  POCT Blood Glucose.: 271 mg/dL (13 Mar 2022 21:45)  POCT Blood Glucose.: 212 mg/dL (13 Mar 2022 16:36)           CRISTI MONTGOMERY 36y Female      Patient is a 36y old  Female who presents with a chief complaint of Colitis (14 Mar 2022 13:38)        INTERVAL HPI/OVERNIGHT EVENTS: Pt still with mild nausea and abdominal cramping. No vomit or diarrhea reported.       PHYSICAL EXAM:  GENERAL: NAD  HEAD:  Normocephalic  EYES:  conjunctiva and sclera clear  ENMT: Moist mucous membranes  NECK: Supple  NERVOUS SYSTEM:  Alert, awake  CHEST/LUNG: Good air exchange bilaterally, no wheeze  HEART: Regular rate and rhythm        Vital Signs Last 24 Hrs  T(C): 36.8 (14 Mar 2022 08:00), Max: 36.8 (14 Mar 2022 08:00)  T(F): 98.2 (14 Mar 2022 08:00), Max: 98.2 (14 Mar 2022 08:00)  HR: 87 (14 Mar 2022 08:00) (87 - 101)  BP: 119/61 (14 Mar 2022 08:00) (106/58 - 134/67)  BP(mean): --  RR: 18 (14 Mar 2022 08:00) (18 - 19)  SpO2: 98% (14 Mar 2022 08:00) (98% - 98%)      Consultant(s) Notes Reviewed:  [X] YES  [ ] NO  Care Discussed with Consultants/Other Providers [X] YES  [ ] NO  Imaging Personally Reviewed:  [X] YES  [ ] NO      LABS:                        9.4    5.98  )-----------( 382      ( 14 Mar 2022 04:30 )             29.8     03-14    138  |  102  |  4<L>  ----------------------------<  138<H>  4.0   |  26  |  <0.5<L>    Ca    8.6      14 Mar 2022 04:30  Mg     1.5     03-14    TPro  6.1  /  Alb  3.7  /  TBili  <0.2  /  DBili  x   /  AST  67<H>  /  ALT  70<H>  /  AlkPhos  79  03-14          CAPILLARY BLOOD GLUCOSE      POCT Blood Glucose.: 141 mg/dL (14 Mar 2022 12:05)  POCT Blood Glucose.: 136 mg/dL (14 Mar 2022 08:06)  POCT Blood Glucose.: 271 mg/dL (13 Mar 2022 21:45)  POCT Blood Glucose.: 212 mg/dL (13 Mar 2022 16:36)

## 2022-03-15 NOTE — PROGRESS NOTE ADULT - ASSESSMENT
36 year old female with PMHx of Type 1 Diabetes on insulin pump, Diabetic Neuropathy, sinus tachycardia, Anxiety with syncope, pseudoseizures, HTN, Chronic back pain, Migraines who presented to the ED with one day history of NBNB emesis and diarrhea (non bloody)      ASSESSMENT & PLAN:      Viral gastroenteritis  Hematochezia  Sepsis on Admission   Hyperglycemia      C diff  ruled out  GI pcr positive for norovirus  Advance diet as tolerated  Restarted antibiotics as she was not improving clinically  Added bentyl for abd cramps  GI noted- outpatient complaint oflonoscopy & EGD in 6-8 weeks  Hyperglycemia- optimize Lantus & lispro with sliding scale coverage( possible non-compliance with diabetic diet)  Incidental Hepatic Hypodensities -MRI as outpatient & follow with GI  Continue current medical management for active chronic comorbid conditions.  DVT Prophylaxis- lovenox s/q  Supportive care including activity, diet, goals of care discussed in AM rounds.  Discussed with  / in AM rounds  Handoff: PO intake still minimal due to persistent GI symptoms including nausea & abd cramps & diarrhoea . Anticipated for discharge tomorrow again

## 2022-03-15 NOTE — PROGRESS NOTE ADULT - ASSESSMENT
This is a 36 year old female with PMHx of Type 1 Diabetes on insulin pump, Diabetic Neuropathy, sinus tachycardia, Anxiety with syncope, pseudoseizures, HTN, Chronic back pain, Migraines who presented to the ED with one day history of NBNB emesis and diarrhea (non bloody).     # Colitis   # Norovirus  - septic on admission   - currently afebrile and WBC WNL     - CT Abdomen and Pelvis w/ IV Cont (03.09.22): Questionable cecal wall thickening, which may represent colitis.  - Abx c/w Aztreonam and Flagyl  - GI c/s: given  that this is the second episode of acute colitis--> r/o infectious vs IBD etiology --> outpatient colonoscopy in 6-8 weeks + EGD   - GI PCR +ve Norovirus   - Calprotectin 70   - C diff negative   - Bcx 3/9 NGTD x2     # Type 1 Diabetes with Diabetic Neuropathy  - Insulin pump removed by patient   - Endo; Dr. Galvez consulted   - lantus 30 and lispro 10 TID       # Incidental Hepatic Hypodensities   - outpt GI and  Outpatient MRI follow-up     # Anxiety  # Pseudoseizures  - Continue on home meds: lexapro 10mg PO daily, Amitriptyline 75mg PO qHS    # GERD  - Continue on Protonix    Activity: As tolerated   Diet: regular   DVT ppx: Lovenox  GI ppx: Protonix  Code Status: Full Code  DISPO: anticipate for tomorrow

## 2022-03-15 NOTE — PROGRESS NOTE ADULT - SUBJECTIVE AND OBJECTIVE BOX
Progress Note:  Provider Speciality                            Hospitalist      CRISTI MONTGOMERY MRN-[id number] 36y Female     CHIEF PRESENTING COMPLAINT:  Patient is a 36y old  Female who presents with a chief complaint of Colitis (10 Mar 2022 10:13)        SUBJECTIVE:  Patient was seen and examined at bedside. Reports no active symptoms of bleeding in AM. Complaints of intermittent abdominal cramps  No significant overnight events reported.     HISTORY OF PRESENTING ILLNESS:  HPI:  This is a 36 year old female with PMHx of Type 1 Diabetes on insulin pump, Diabetic Neuropathy, sinus tachycardia, Anxiety with syncope, pseudoseizures, HTN, Chronic back pain, Migraines who presented to the ED with one day history of NBNB emesis and diarrhea (non bloody). The patient states that it started one day ago and she had been unable to keep anything down. She notes that her insulin pump detected a finger stick over 400 and she subsequently gave herself 9 units of insulin. The patient then came to the ED.     In the ED initial vitals: /71, , T 101.8, Sat 100% on room air. Labs noted for mildly elevated Beta-hydroxy at 1.0, UA noted for ketones  in the urine and glucose over 1000. VBG pH 7.35 with lactate over 2.5. CT concerning for colitis. Given antibiotics and admitted to medicine.     [facility] rec reviewed with the patient who has an active list of her medications and is of sound mind.  (09 Mar 2022 17:20)        REVIEW OF SYSTEMS:  Patient denies any headache, any vision complaints, runny nose, fever, chills. Denies chest pain, shortness of breath, palpitation.  Denies dysuria. Denies  localized weakness in any part of the body or numbness.   At least 10 systems were reviewed in ROS. All systems reviewed  are within normal limits except for the complaints as described in Subjective.    PAST MEDICAL & SURGICAL HISTORY:  PAST MEDICAL & SURGICAL HISTORY:  Neuropathy  right lower extremity, vaginal    Type 1 diabetes    Degenerative disc disease, thoracic    Urinary tract infection, recurrent    Gastroesophageal reflux disease, esophagitis presence not specified    Intractable headache, unspecified chronicity pattern, unspecified headache type    Major depressive disorder with single episode, in full remission  previously treated with zyprexa, celexa and lexapro    Tremor of right hand    Tachycardia    Spinal stenosis    No significant past surgical history            VITAL SIGNS:  Vital Signs Last 24 Hrs  T(C): 36.7 (15 Mar 2022 08:00), Max: 36.9 (14 Mar 2022 15:49)  T(F): 98.1 (15 Mar 2022 08:00), Max: 98.4 (14 Mar 2022 15:49)  HR: 90 (15 Mar 2022 08:00) (85 - 109)  BP: 121/60 (15 Mar 2022 08:00) (119/70 - 130/82)  BP(mean): --  RR: 18 (15 Mar 2022 08:00) (18 - 18)  SpO2: 99% (15 Mar 2022 08:00) (98% - 99%)      PHYSICAL EXAMINATION:  Not in acute distress  General: No icterus  HEENT:   no JVD.  Heart: S1+S2 audible  Lungs: bilateral  moderate air entry, no wheezing, no crepitations.  Abdomen: Soft, non-tender, non-distended , no  rigidity or guarding.  CNS: Awake alert, CN  grossly intact.  Extremities:  No edema            CONSULTS:  Consultant(s) Notes Reviewed by me.   Care Discussed with Consultants/Other Providers where required.        MEDICATIONS:  MEDICATIONS  (STANDING):  amitriptyline 75 milliGRAM(s) Oral at bedtime  ammonium lactate 12% Lotion 1 Application(s) Topical two times a day  aspirin  chewable 81 milliGRAM(s) Oral daily  aztreonam  IVPB 2000 milliGRAM(s) IV Intermittent every 8 hours  dextrose 40% Gel 15 Gram(s) Oral once  dextrose 5%. 1000 milliLiter(s) (50 mL/Hr) IV Continuous <Continuous>  dextrose 5%. 1000 milliLiter(s) (100 mL/Hr) IV Continuous <Continuous>  dextrose 50% Injectable 25 Gram(s) IV Push once  dextrose 50% Injectable 12.5 Gram(s) IV Push once  dextrose 50% Injectable 25 Gram(s) IV Push once  enoxaparin Injectable 40 milliGRAM(s) SubCutaneous every 24 hours  escitalopram 10 milliGRAM(s) Oral daily  glucagon  Injectable 1 milliGRAM(s) IntraMuscular once  insulin glargine Injectable (LANTUS) 20 Unit(s) SubCutaneous at bedtime  insulin lispro (ADMELOG) corrective regimen sliding scale   SubCutaneous three times a day before meals  insulin lispro Injectable (ADMELOG) 5 Unit(s) SubCutaneous three times a day before meals  lactated ringers. 1000 milliLiter(s) (100 mL/Hr) IV Continuous <Continuous>  metoprolol tartrate 25 milliGRAM(s) Oral two times a day  metroNIDAZOLE  IVPB 500 milliGRAM(s) IV Intermittent every 8 hours  multivitamin 1 Tablet(s) Oral daily  pantoprazole    Tablet 40 milliGRAM(s) Oral before breakfast  senna 2 Tablet(s) Oral at bedtime    MEDICATIONS  (PRN):

## 2022-03-16 ENCOUNTER — TRANSCRIPTION ENCOUNTER (OUTPATIENT)
Age: 37
End: 2022-03-16

## 2022-03-16 LAB
ALBUMIN SERPL ELPH-MCNC: 3.6 G/DL — SIGNIFICANT CHANGE UP (ref 3.5–5.2)
ALP SERPL-CCNC: 98 U/L — SIGNIFICANT CHANGE UP (ref 30–115)
ALT FLD-CCNC: 84 U/L — HIGH (ref 0–41)
ANION GAP SERPL CALC-SCNC: 9 MMOL/L — SIGNIFICANT CHANGE UP (ref 7–14)
APPEARANCE UR: CLEAR — SIGNIFICANT CHANGE UP
AST SERPL-CCNC: 71 U/L — HIGH (ref 0–41)
BASOPHILS # BLD AUTO: 0.04 K/UL — SIGNIFICANT CHANGE UP (ref 0–0.2)
BASOPHILS NFR BLD AUTO: 0.6 % — SIGNIFICANT CHANGE UP (ref 0–1)
BILIRUB SERPL-MCNC: <0.2 MG/DL — SIGNIFICANT CHANGE UP (ref 0.2–1.2)
BILIRUB UR-MCNC: NEGATIVE — SIGNIFICANT CHANGE UP
BUN SERPL-MCNC: 8 MG/DL — LOW (ref 10–20)
CALCIUM SERPL-MCNC: 8.8 MG/DL — SIGNIFICANT CHANGE UP (ref 8.5–10.1)
CHLORIDE SERPL-SCNC: 98 MMOL/L — SIGNIFICANT CHANGE UP (ref 98–110)
CO2 SERPL-SCNC: 26 MMOL/L — SIGNIFICANT CHANGE UP (ref 17–32)
COLOR SPEC: YELLOW — SIGNIFICANT CHANGE UP
CREAT SERPL-MCNC: 0.5 MG/DL — LOW (ref 0.7–1.5)
DIFF PNL FLD: NEGATIVE — SIGNIFICANT CHANGE UP
EGFR: 125 ML/MIN/1.73M2 — SIGNIFICANT CHANGE UP
EOSINOPHIL # BLD AUTO: 0.39 K/UL — SIGNIFICANT CHANGE UP (ref 0–0.7)
EOSINOPHIL NFR BLD AUTO: 6.1 % — SIGNIFICANT CHANGE UP (ref 0–8)
GLUCOSE BLDC GLUCOMTR-MCNC: 200 MG/DL — HIGH (ref 70–99)
GLUCOSE BLDC GLUCOMTR-MCNC: 228 MG/DL — HIGH (ref 70–99)
GLUCOSE BLDC GLUCOMTR-MCNC: 245 MG/DL — HIGH (ref 70–99)
GLUCOSE BLDC GLUCOMTR-MCNC: 286 MG/DL — HIGH (ref 70–99)
GLUCOSE SERPL-MCNC: 255 MG/DL — HIGH (ref 70–99)
GLUCOSE UR QL: ABNORMAL
HCT VFR BLD CALC: 31.2 % — LOW (ref 37–47)
HGB BLD-MCNC: 9.7 G/DL — LOW (ref 12–16)
IMM GRANULOCYTES NFR BLD AUTO: 0.8 % — HIGH (ref 0.1–0.3)
KETONES UR-MCNC: ABNORMAL
LEUKOCYTE ESTERASE UR-ACNC: NEGATIVE — SIGNIFICANT CHANGE UP
LYMPHOCYTES # BLD AUTO: 2.86 K/UL — SIGNIFICANT CHANGE UP (ref 1.2–3.4)
LYMPHOCYTES # BLD AUTO: 44.4 % — SIGNIFICANT CHANGE UP (ref 20.5–51.1)
MAGNESIUM SERPL-MCNC: 1.7 MG/DL — LOW (ref 1.8–2.4)
MCHC RBC-ENTMCNC: 24.9 PG — LOW (ref 27–31)
MCHC RBC-ENTMCNC: 31.1 G/DL — LOW (ref 32–37)
MCV RBC AUTO: 80 FL — LOW (ref 81–99)
MONOCYTES # BLD AUTO: 0.53 K/UL — SIGNIFICANT CHANGE UP (ref 0.1–0.6)
MONOCYTES NFR BLD AUTO: 8.2 % — SIGNIFICANT CHANGE UP (ref 1.7–9.3)
NEUTROPHILS # BLD AUTO: 2.57 K/UL — SIGNIFICANT CHANGE UP (ref 1.4–6.5)
NEUTROPHILS NFR BLD AUTO: 39.9 % — LOW (ref 42.2–75.2)
NITRITE UR-MCNC: NEGATIVE — SIGNIFICANT CHANGE UP
NRBC # BLD: 0 /100 WBCS — SIGNIFICANT CHANGE UP (ref 0–0)
PH UR: 6.5 — SIGNIFICANT CHANGE UP (ref 5–8)
PLATELET # BLD AUTO: 411 K/UL — HIGH (ref 130–400)
POTASSIUM SERPL-MCNC: 4.4 MMOL/L — SIGNIFICANT CHANGE UP (ref 3.5–5)
POTASSIUM SERPL-SCNC: 4.4 MMOL/L — SIGNIFICANT CHANGE UP (ref 3.5–5)
PROT SERPL-MCNC: 6.4 G/DL — SIGNIFICANT CHANGE UP (ref 6–8)
PROT UR-MCNC: SIGNIFICANT CHANGE UP
RBC # BLD: 3.9 M/UL — LOW (ref 4.2–5.4)
RBC # FLD: 15.8 % — HIGH (ref 11.5–14.5)
SODIUM SERPL-SCNC: 133 MMOL/L — LOW (ref 135–146)
SP GR SPEC: 1.03 — HIGH (ref 1.01–1.03)
UROBILINOGEN FLD QL: SIGNIFICANT CHANGE UP
WBC # BLD: 6.44 K/UL — SIGNIFICANT CHANGE UP (ref 4.8–10.8)
WBC # FLD AUTO: 6.44 K/UL — SIGNIFICANT CHANGE UP (ref 4.8–10.8)

## 2022-03-16 PROCEDURE — 99232 SBSQ HOSP IP/OBS MODERATE 35: CPT

## 2022-03-16 RX ORDER — INSULIN HUMAN 100 [IU]/ML
1 INJECTION, SOLUTION SUBCUTANEOUS
Qty: 1 | Refills: 0
Start: 2022-03-16 | End: 2022-04-14

## 2022-03-16 RX ORDER — INSULIN LISPRO 100/ML
11 VIAL (ML) SUBCUTANEOUS
Refills: 0 | Status: DISCONTINUED | OUTPATIENT
Start: 2022-03-16 | End: 2022-03-17

## 2022-03-16 RX ORDER — INDOMETHACIN 50 MG
1 CAPSULE ORAL
Qty: 0 | Refills: 0 | DISCHARGE

## 2022-03-16 RX ORDER — MAGNESIUM SULFATE 500 MG/ML
2 VIAL (ML) INJECTION ONCE
Refills: 0 | Status: COMPLETED | OUTPATIENT
Start: 2022-03-16 | End: 2022-03-16

## 2022-03-16 RX ORDER — INSULIN GLARGINE 100 [IU]/ML
33 INJECTION, SOLUTION SUBCUTANEOUS AT BEDTIME
Refills: 0 | Status: DISCONTINUED | OUTPATIENT
Start: 2022-03-16 | End: 2022-03-18

## 2022-03-16 RX ADMIN — ENOXAPARIN SODIUM 40 MILLIGRAM(S): 100 INJECTION SUBCUTANEOUS at 17:24

## 2022-03-16 RX ADMIN — Medication 2: at 11:43

## 2022-03-16 RX ADMIN — ESCITALOPRAM OXALATE 10 MILLIGRAM(S): 10 TABLET, FILM COATED ORAL at 11:42

## 2022-03-16 RX ADMIN — Medication 75 MILLIGRAM(S): at 22:36

## 2022-03-16 RX ADMIN — Medication 1 TABLET(S): at 11:41

## 2022-03-16 RX ADMIN — SENNA PLUS 2 TABLET(S): 8.6 TABLET ORAL at 22:37

## 2022-03-16 RX ADMIN — Medication 1: at 17:24

## 2022-03-16 RX ADMIN — Medication 10 MILLIGRAM(S): at 11:42

## 2022-03-16 RX ADMIN — Medication 81 MILLIGRAM(S): at 11:42

## 2022-03-16 RX ADMIN — Medication 25 GRAM(S): at 11:42

## 2022-03-16 RX ADMIN — Medication 10 MILLIGRAM(S): at 07:32

## 2022-03-16 RX ADMIN — Medication 100 MILLIGRAM(S): at 14:13

## 2022-03-16 RX ADMIN — Medication 25 MILLIGRAM(S): at 05:45

## 2022-03-16 RX ADMIN — Medication 11 UNIT(S): at 17:24

## 2022-03-16 RX ADMIN — Medication 2: at 07:32

## 2022-03-16 RX ADMIN — Medication 1 APPLICATION(S): at 05:45

## 2022-03-16 RX ADMIN — Medication 10 MILLIGRAM(S): at 17:23

## 2022-03-16 RX ADMIN — OXYCODONE AND ACETAMINOPHEN 2 TABLET(S): 5; 325 TABLET ORAL at 05:50

## 2022-03-16 RX ADMIN — Medication 10 UNIT(S): at 07:32

## 2022-03-16 RX ADMIN — Medication 100 MILLIGRAM(S): at 07:07

## 2022-03-16 RX ADMIN — Medication 100 MILLIGRAM(S): at 05:45

## 2022-03-16 RX ADMIN — Medication 1 APPLICATION(S): at 17:27

## 2022-03-16 RX ADMIN — INSULIN GLARGINE 33 UNIT(S): 100 INJECTION, SOLUTION SUBCUTANEOUS at 22:05

## 2022-03-16 RX ADMIN — PANTOPRAZOLE SODIUM 40 MILLIGRAM(S): 20 TABLET, DELAYED RELEASE ORAL at 07:32

## 2022-03-16 RX ADMIN — Medication 25 MILLIGRAM(S): at 17:23

## 2022-03-16 RX ADMIN — Medication 11 UNIT(S): at 11:43

## 2022-03-16 NOTE — PROGRESS NOTE ADULT - SUBJECTIVE AND OBJECTIVE BOX
SUBJECTIVE:    Patient is a 36y old Female who presents with a chief complaint of Colitis (16 Mar 2022 09:12)    Currently admitted to medicine with the primary diagnosis of Acute diarrhea       Today is hospital day 7d.     PAST MEDICAL & SURGICAL HISTORY  Neuropathy  right lower extremity, vaginal    Type 1 diabetes    Degenerative disc disease, thoracic    Urinary tract infection, recurrent    Gastroesophageal reflux disease, esophagitis presence not specified    Intractable headache, unspecified chronicity pattern, unspecified headache type    Major depressive disorder with single episode, in full remission  previously treated with zyprexa, celexa and lexapro    Tremor of right hand    Tachycardia    Spinal stenosis    No significant past surgical history      ALLERGIES:  Ceclor (Rash)  clindamycin (Hives; Nephrotoxicity)  penicillins (Hives)    MEDICATIONS:  STANDING MEDICATIONS  amitriptyline 75 milliGRAM(s) Oral at bedtime  ammonium lactate 12% Lotion 1 Application(s) Topical two times a day  aspirin  chewable 81 milliGRAM(s) Oral daily  dicyclomine 10 milliGRAM(s) Oral three times a day before meals  enoxaparin Injectable 40 milliGRAM(s) SubCutaneous every 24 hours  escitalopram 10 milliGRAM(s) Oral daily  insulin glargine Injectable (LANTUS) 33 Unit(s) SubCutaneous at bedtime  insulin lispro (ADMELOG) corrective regimen sliding scale   SubCutaneous three times a day before meals  insulin lispro Injectable (ADMELOG) 11 Unit(s) SubCutaneous three times a day before meals  metoprolol tartrate 25 milliGRAM(s) Oral two times a day  multivitamin 1 Tablet(s) Oral daily  pantoprazole    Tablet 40 milliGRAM(s) Oral before breakfast  senna 2 Tablet(s) Oral at bedtime    PRN MEDICATIONS  acetaminophen     Tablet .. 650 milliGRAM(s) Oral every 6 hours PRN    VITALS:   T(F): 97.5  HR: 85  BP: 127/61  RR: 18  SpO2: 98%    LABS:                        9.7    6.44  )-----------( 411      ( 16 Mar 2022 06:40 )             31.2     03-16    133<L>  |  98  |  8<L>  ----------------------------<  255<H>  4.4   |  26  |  0.5<L>    Ca    8.8      16 Mar 2022 06:40  Mg     1.7     03-16    TPro  6.4  /  Alb  3.6  /  TBili  <0.2  /  DBili  x   /  AST  71<H>  /  ALT  84<H>  /  AlkPhos  98        Urinalysis Basic - ( 16 Mar 2022 06:10 )    Color: Yellow / Appearance: Clear / S.035 / pH: x  Gluc: x / Ketone: Small  / Bili: Negative / Urobili: <2 mg/dL   Blood: x / Protein: Trace / Nitrite: Negative   Leuk Esterase: Negative / RBC: x / WBC x   Sq Epi: x / Non Sq Epi: x / Bacteria: x                RADIOLOGY:    PHYSICAL EXAM:  GEN: No acute distress  LUNGS: Clear to auscultation bilaterally   HEART: S1/S2 present. RRR.   ABD/ GI: Soft, non-tender, non-distended. Bowel sounds present  EXT: NC/NC/NE/2+PP/JOHNSON  NEURO: AAOX3

## 2022-03-16 NOTE — PROGRESS NOTE ADULT - SUBJECTIVE AND OBJECTIVE BOX
CRISTI MONTGOMERY 36y Female      Patient is a 36y old  Female who presents with a chief complaint of Colitis (14 Mar 2022 13:38)        INTERVAL HPI/OVERNIGHT EVENTS: Pt still with mild nausea and abdominal cramping. No vomit or diarrhea reported.       PHYSICAL EXAM:  GENERAL: NAD  HEAD:  Normocephalic  EYES:  conjunctiva and sclera clear  ENMT: Moist mucous membranes  NECK: Supple  NERVOUS SYSTEM:  Alert, awake  CHEST/LUNG: Good air exchange bilaterally, no wheeze  HEART: Regular rate and rhythm        Vital Signs Last 24 Hrs  T(C): 36.8 (14 Mar 2022 08:00), Max: 36.8 (14 Mar 2022 08:00)  T(F): 98.2 (14 Mar 2022 08:00), Max: 98.2 (14 Mar 2022 08:00)  HR: 87 (14 Mar 2022 08:00) (87 - 101)  BP: 119/61 (14 Mar 2022 08:00) (106/58 - 134/67)  BP(mean): --  RR: 18 (14 Mar 2022 08:00) (18 - 19)  SpO2: 98% (14 Mar 2022 08:00) (98% - 98%)      Consultant(s) Notes Reviewed:  [X] YES  [ ] NO  Care Discussed with Consultants/Other Providers [X] YES  [ ] NO  Imaging Personally Reviewed:  [X] YES  [ ] NO      LABS:                        9.4    5.98  )-----------( 382      ( 14 Mar 2022 04:30 )             29.8     03-14    138  |  102  |  4<L>  ----------------------------<  138<H>  4.0   |  26  |  <0.5<L>    Ca    8.6      14 Mar 2022 04:30  Mg     1.5     03-14    TPro  6.1  /  Alb  3.7  /  TBili  <0.2  /  DBili  x   /  AST  67<H>  /  ALT  70<H>  /  AlkPhos  79  03-14          CAPILLARY BLOOD GLUCOSE      POCT Blood Glucose.: 141 mg/dL (14 Mar 2022 12:05)  POCT Blood Glucose.: 136 mg/dL (14 Mar 2022 08:06)  POCT Blood Glucose.: 271 mg/dL (13 Mar 2022 21:45)  POCT Blood Glucose.: 212 mg/dL (13 Mar 2022 16:36)

## 2022-03-16 NOTE — DISCHARGE NOTE NURSING/CASE MANAGEMENT/SOCIAL WORK - PATIENT PORTAL LINK FT
You can access the FollowMyHealth Patient Portal offered by [facility] by registering at the following website: [email or web address]. By joining [employer]’s FollowMyHealth portal, you will also be able to view your health information using other applications (apps) compatible with our system.

## 2022-03-16 NOTE — DISCHARGE NOTE PROVIDER - HOSPITAL COURSE
36 year old female with PMHx of Type 1 Diabetes on insulin pump, Diabetic Neuropathy, sinus tachycardia, Anxiety with syncope, pseudoseizures, HTN, Chronic back pain, Migraines who presented to the ED with one day history of NBNB emesis and diarrhea (non bloody). The patient states that it started one day ago and she had been unable to keep anything down. She notes that her insulin pump detected a finger stick over 400 and she subsequently gave herself 9 units of insulin. The patient then came to the ED.     In the ED initial vitals: /71, , T 101.8, Sat 100% on room air. Labs noted for mildly elevated Beta-hydroxy at 1.0, UA noted for ketones  in the urine and glucose over 1000. VBG pH 7.35 with lactate over 2.5. CT concerning for colitis. Given antibiotics and admitted to medicine.     [facility] rec reviewed with the patient who has an active list of her medications and is of sound mind.   Hospital course was uncomplicated. Pt's stool sample tested positive for Norovirus, cdiff (-). Pt was started on Bentyl for persistent abd spasms and IV hydration. Diarrhea resolved spontaneously, labs were unremarkable but pt's continued to complain of feeling unwell, which delayed hospital stay. She was also seen by endocrinology who recommended Lantus lispro + SS and returning to insulin pump upon d/c.   Pt is stabel for d/c home today with PMD and endocrine follow up.      36 year old female with PMHx of Type 1 Diabetes on insulin pump, Diabetic Neuropathy, sinus tachycardia, Anxiety with syncope, pseudoseizures, HTN, Chronic back pain, Migraines who presented to the ED with one day history of NBNB emesis and diarrhea (non bloody). The patient states that it started one day ago and she had been unable to keep anything down. She notes that her insulin pump detected a finger stick over 400 and she subsequently gave herself 9 units of insulin. The patient then came to the ED.     In the ED initial vitals: /71, , T 101.8, Sat 100% on room air. Labs noted for mildly elevated Beta-hydroxy at 1.0, UA noted for ketones  in the urine and glucose over 1000. VBG pH 7.35 with lactate over 2.5. CT concerning for colitis. Given antibiotics and admitted to medicine.     [facility] rec reviewed with the patient who has an active list of her medications and is of sound mind.   Hospital course was uncomplicated. Pt's stool sample tested positive for Norovirus, cdiff (-). Pt was started on Bentyl for persistent abd spasms and IV hydration. Diarrhea resolved spontaneously, labs were unremarkable but pt's continued to complain of feeling unwell, which delayed hospital stay. Pt to follow up with GI as outpt to r/o IBD as this is the second episode of colitis. She was also seen by endocrinology who recommended Lantus lispro + SS and returning to insulin pump upon d/c.   Pt is stabel for d/c home today with PMD and endocrine follow up.

## 2022-03-16 NOTE — DISCHARGE NOTE PROVIDER - NSDCCPCAREPLAN_GEN_ALL_CORE_FT
PRINCIPAL DISCHARGE DIAGNOSIS  Diagnosis: Acute diarrhea  Assessment and Plan of Treatment: Your stool sample was positive for Norovirus which is an infection that commonly couses diarrhea and abdominal spasms. We worked you up for cdiff infection, which was negative. You completed a short course of antibiotics for slow clinical improvement, but you do not need to continue it on discharge.      SECONDARY DISCHARGE DIAGNOSES  Diagnosis: Dehydration  Assessment and Plan of Treatment: Due to dirrhea. Please continue with ample hydration.    Diagnosis: H/O type 1 diabetes mellitus  Assessment and Plan of Treatment: You received subcutenous insuline during your hospital admission. Please follow up with endocrinology in 1-3 days and continue with your insulin pump.

## 2022-03-16 NOTE — DISCHARGE NOTE PROVIDER - CARE PROVIDER_API CALL
Davidson Navas)  Internal Medicine  [address] [place] [id number]  Phone: [phone]  Fax: [phone]  Follow Up Time: 1 week    Elizabeth Galvez)  Medicine  [address] [place] [address]  Phone: [phone]  Fax: [phone]  Follow Up Time: 1-3 days

## 2022-03-16 NOTE — DISCHARGE NOTE PROVIDER - PROVIDER TOKENS
PROVIDER:[TOKEN:[[id number]],FOLLOWUP:[1 week]],PROVIDER:[TOKEN:[[id number]],FOLLOWUP:[1-3 days]]

## 2022-03-16 NOTE — DISCHARGE NOTE NURSING/CASE MANAGEMENT/SOCIAL WORK - NSDCPEFALRISK_GEN_ALL_CORE
For information on Fall & Injury Prevention, visit: [email or web address] OR  [email or web address] OR  https://www.cdc.gov/steadi/patient.html

## 2022-03-16 NOTE — DISCHARGE NOTE PROVIDER - NSDCFUSCHEDAPPT_GEN_ALL_CORE_FT
CRISTI MONTGOMERY ; 03/22/2022 ; NPP Endocrin [address] ; 04/11/2022 ; NPP Podiatry [address] ; 05/10/2022 ; NPP OB/ CRISTI Sawyer ; 05/16/2022 ; NPP Internal Med [address] ; 06/13/2022 ; NPP Gastro Doc Off [id number]

## 2022-03-16 NOTE — DISCHARGE NOTE PROVIDER - NSDCMRMEDTOKEN_GEN_ALL_CORE_FT
amitriptyline 75 mg oral tablet: 1 tab(s) orally once a day (at bedtime)  ammonium lactate 12% topical lotion: Apply topically to affected area 2 times a day  aspirin 81 mg oral tablet, chewable: 1 tab(s) orally once a day  dicyclomine 10 mg oral capsule: 1 cap(s) orally 3 times a day (before meals)  indomethacin 50 mg oral capsule: 1 cap(s) orally 3 times a day, As Needed  insulin lispro: once a day  Lexapro 10 mg oral tablet: 1 tab(s) orally once a day  metoprolol tartrate 25 mg oral tablet: 1 tab(s) orally 2 times a day  multivitamin: 1 tab(s) orally once a day  pantoprazole 40 mg oral delayed release tablet: 1 tab(s) orally once a day (before a meal)   amitriptyline 75 mg oral tablet: 1 tab(s) orally once a day (at bedtime)  ammonium lactate 12% topical lotion: Apply topically to affected area 2 times a day  aspirin 81 mg oral tablet, chewable: 1 tab(s) orally once a day  dicyclomine 10 mg oral capsule: 1 cap(s) orally 3 times a day (before meals)  insulin lispro: once a day  insulin pump: 1 unit(s) subcutaneous once a day   Lexapro 10 mg oral tablet: 1 tab(s) orally once a day  metoprolol tartrate 25 mg oral tablet: 1 tab(s) orally 2 times a day  multivitamin: 1 tab(s) orally once a day  pantoprazole 40 mg oral delayed release tablet: 1 tab(s) orally once a day (before a meal)

## 2022-03-16 NOTE — PROGRESS NOTE ADULT - ASSESSMENT
36 year old female with PMHx of Type 1 Diabetes on insulin pump, Diabetic Neuropathy, sinus tachycardia, Anxiety with syncope, pseudoseizures, HTN, Chronic back pain, Migraines who presented to the ED with one day history of NBNB emesis and diarrhea (non bloody)      ASSESSMENT & PLAN:      Viral gastroenteritis  Hematochezia  Sepsis on Admission   Hyperglycemia      C diff  ruled out  GI pcr positive for norovirus  Advance diet as tolerated  Added bentyl for abd cramps  GI noted- outpatient complaint colonoscopy & EGD in 6-8 weeks  Hyperglycemia/ DM- optimize Lantus & lispro with sliding scale coverage( possible non-compliance with diabetic diet)  Incidental Hepatic Hypodensities -MRI as outpatient & follow with GI  patient is still refusing DC-- she said [facility] will not take her as her father is ill-- DC to [facility] today-- spent more than 30mins.   36 year old female with PMHx of Type 1 Diabetes on insulin pump, Diabetic Neuropathy, sinus tachycardia, Anxiety with syncope, pseudoseizures, HTN, Chronic back pain, Migraines who presented to the ED with one day history of NBNB emesis and diarrhea (non bloody)      ASSESSMENT & PLAN:      Viral gastroenteritis  Hematochezia  Sepsis on Admission   Hyperglycemia      C diff  ruled out  GI pcr positive for norovirus   diet as tolerated  Added bentyl for abd cramps  GI noted- outpatient complaint colonoscopy & EGD in 6-8 weeks   DM- hba1c is 7.8-- has insulin pump  Incidental Hepatic Hypodensities -MRI as outpatient & follow with GI  patient is still refusing DC-- she said [facility] will not take her as her father is ill-- DC to [facility] today-- spent more than 30mins.

## 2022-03-17 LAB
ALBUMIN SERPL ELPH-MCNC: 4 G/DL — SIGNIFICANT CHANGE UP (ref 3.5–5.2)
ALP SERPL-CCNC: 100 U/L — SIGNIFICANT CHANGE UP (ref 30–115)
ALT FLD-CCNC: 110 U/L — HIGH (ref 0–41)
ANION GAP SERPL CALC-SCNC: 12 MMOL/L — SIGNIFICANT CHANGE UP (ref 7–14)
AST SERPL-CCNC: 82 U/L — HIGH (ref 0–41)
BASOPHILS # BLD AUTO: 0.03 K/UL — SIGNIFICANT CHANGE UP (ref 0–0.2)
BASOPHILS NFR BLD AUTO: 0.5 % — SIGNIFICANT CHANGE UP (ref 0–1)
BILIRUB SERPL-MCNC: <0.2 MG/DL — SIGNIFICANT CHANGE UP (ref 0.2–1.2)
BUN SERPL-MCNC: 6 MG/DL — LOW (ref 10–20)
CALCIUM SERPL-MCNC: 9.1 MG/DL — SIGNIFICANT CHANGE UP (ref 8.5–10.1)
CALPROTECTIN STL-MCNT: 555 UG/G — HIGH (ref 0–120)
CHLORIDE SERPL-SCNC: 98 MMOL/L — SIGNIFICANT CHANGE UP (ref 98–110)
CO2 SERPL-SCNC: 24 MMOL/L — SIGNIFICANT CHANGE UP (ref 17–32)
CREAT SERPL-MCNC: 0.5 MG/DL — LOW (ref 0.7–1.5)
CULTURE RESULTS: SIGNIFICANT CHANGE UP
EGFR: 125 ML/MIN/1.73M2 — SIGNIFICANT CHANGE UP
EOSINOPHIL # BLD AUTO: 0.35 K/UL — SIGNIFICANT CHANGE UP (ref 0–0.7)
EOSINOPHIL NFR BLD AUTO: 5.5 % — SIGNIFICANT CHANGE UP (ref 0–8)
GLUCOSE BLDC GLUCOMTR-MCNC: 160 MG/DL — HIGH (ref 70–99)
GLUCOSE BLDC GLUCOMTR-MCNC: 237 MG/DL — HIGH (ref 70–99)
GLUCOSE BLDC GLUCOMTR-MCNC: 279 MG/DL — HIGH (ref 70–99)
GLUCOSE BLDC GLUCOMTR-MCNC: 88 MG/DL — SIGNIFICANT CHANGE UP (ref 70–99)
GLUCOSE SERPL-MCNC: 168 MG/DL — HIGH (ref 70–99)
HCT VFR BLD CALC: 33.1 % — LOW (ref 37–47)
HGB BLD-MCNC: 10.5 G/DL — LOW (ref 12–16)
IMM GRANULOCYTES NFR BLD AUTO: 0.6 % — HIGH (ref 0.1–0.3)
LYMPHOCYTES # BLD AUTO: 2.14 K/UL — SIGNIFICANT CHANGE UP (ref 1.2–3.4)
LYMPHOCYTES # BLD AUTO: 33.5 % — SIGNIFICANT CHANGE UP (ref 20.5–51.1)
MAGNESIUM SERPL-MCNC: 1.8 MG/DL — SIGNIFICANT CHANGE UP (ref 1.8–2.4)
MCHC RBC-ENTMCNC: 24.8 PG — LOW (ref 27–31)
MCHC RBC-ENTMCNC: 31.7 G/DL — LOW (ref 32–37)
MCV RBC AUTO: 78.3 FL — LOW (ref 81–99)
MONOCYTES # BLD AUTO: 0.57 K/UL — SIGNIFICANT CHANGE UP (ref 0.1–0.6)
MONOCYTES NFR BLD AUTO: 8.9 % — SIGNIFICANT CHANGE UP (ref 1.7–9.3)
NEUTROPHILS # BLD AUTO: 3.25 K/UL — SIGNIFICANT CHANGE UP (ref 1.4–6.5)
NEUTROPHILS NFR BLD AUTO: 51 % — SIGNIFICANT CHANGE UP (ref 42.2–75.2)
NRBC # BLD: 0 /100 WBCS — SIGNIFICANT CHANGE UP (ref 0–0)
PLATELET # BLD AUTO: 476 K/UL — HIGH (ref 130–400)
POTASSIUM SERPL-MCNC: 4.6 MMOL/L — SIGNIFICANT CHANGE UP (ref 3.5–5)
POTASSIUM SERPL-SCNC: 4.6 MMOL/L — SIGNIFICANT CHANGE UP (ref 3.5–5)
PROT SERPL-MCNC: 6.8 G/DL — SIGNIFICANT CHANGE UP (ref 6–8)
RBC # BLD: 4.23 M/UL — SIGNIFICANT CHANGE UP (ref 4.2–5.4)
RBC # FLD: 16.1 % — HIGH (ref 11.5–14.5)
SODIUM SERPL-SCNC: 134 MMOL/L — LOW (ref 135–146)
SPECIMEN SOURCE: SIGNIFICANT CHANGE UP
WBC # BLD: 6.38 K/UL — SIGNIFICANT CHANGE UP (ref 4.8–10.8)
WBC # FLD AUTO: 6.38 K/UL — SIGNIFICANT CHANGE UP (ref 4.8–10.8)

## 2022-03-17 PROCEDURE — 99232 SBSQ HOSP IP/OBS MODERATE 35: CPT

## 2022-03-17 RX ORDER — INSULIN LISPRO 100/ML
13 VIAL (ML) SUBCUTANEOUS
Refills: 0 | Status: DISCONTINUED | OUTPATIENT
Start: 2022-03-17 | End: 2022-03-18

## 2022-03-17 RX ADMIN — Medication 650 MILLIGRAM(S): at 06:17

## 2022-03-17 RX ADMIN — Medication 1 TABLET(S): at 11:55

## 2022-03-17 RX ADMIN — Medication 11 UNIT(S): at 07:55

## 2022-03-17 RX ADMIN — ESCITALOPRAM OXALATE 10 MILLIGRAM(S): 10 TABLET, FILM COATED ORAL at 11:55

## 2022-03-17 RX ADMIN — Medication 10 MILLIGRAM(S): at 06:18

## 2022-03-17 RX ADMIN — Medication 81 MILLIGRAM(S): at 11:55

## 2022-03-17 RX ADMIN — Medication 13 UNIT(S): at 17:15

## 2022-03-17 RX ADMIN — Medication 25 MILLIGRAM(S): at 06:18

## 2022-03-17 RX ADMIN — Medication 75 MILLIGRAM(S): at 21:49

## 2022-03-17 RX ADMIN — Medication 1 APPLICATION(S): at 06:18

## 2022-03-17 RX ADMIN — Medication 1: at 07:55

## 2022-03-17 RX ADMIN — Medication 2: at 11:55

## 2022-03-17 RX ADMIN — Medication 10 MILLIGRAM(S): at 17:16

## 2022-03-17 RX ADMIN — Medication 25 MILLIGRAM(S): at 17:16

## 2022-03-17 RX ADMIN — Medication 11 UNIT(S): at 11:56

## 2022-03-17 RX ADMIN — Medication 1 APPLICATION(S): at 17:14

## 2022-03-17 RX ADMIN — PANTOPRAZOLE SODIUM 40 MILLIGRAM(S): 20 TABLET, DELAYED RELEASE ORAL at 07:55

## 2022-03-17 RX ADMIN — Medication 3: at 17:15

## 2022-03-17 RX ADMIN — ENOXAPARIN SODIUM 40 MILLIGRAM(S): 100 INJECTION SUBCUTANEOUS at 17:16

## 2022-03-17 RX ADMIN — Medication 10 MILLIGRAM(S): at 11:54

## 2022-03-17 NOTE — PROGRESS NOTE ADULT - ASSESSMENT
This is a 36 year old female with PMHx of Type 1 Diabetes on insulin pump, Diabetic Neuropathy, sinus tachycardia, Anxiety with syncope, pseudoseizures, HTN, Chronic back pain, Migraines who presented to the ED with one day history of NBNB emesis and diarrhea (non bloody).     # Colitis   # Norovirus  - septic on admission   - currently afebrile and WBC WNL     - CT Abdomen and Pelvis w/ IV Cont (03.09.22): Questionable cecal wall thickening, which may represent colitis.  - sp aztreonam and flagyl   - GI c/s: given  that this is the second episode of acute colitis--> r/o infectious vs IBD etiology --> outpatient colonoscopy in 6-8 weeks + EGD   - GI PCR +ve Norovirus   - Calprotectin 70   - C diff negative   - Bcx 3/9 NGTD x2    # Transaminitis   - likely in the setting of taking percocet      # Type 1 Diabetes with Diabetic Neuropathy  - Insulin pump removed by patient   - Endo; Dr. Galvez consulted   - lantus 33 and lispro 13 TID       # Incidental Hepatic Hypodensities   - outpt GI and  Outpatient MRI follow-up     # Anxiety  # Pseudoseizures  - Continue on home meds: lexapro 10mg PO daily, Amitriptyline 75mg PO qHS    # GERD  - Continue on Protonix    Activity: As tolerated   Diet: regular   DVT ppx: Lovenox  GI ppx: Protonix  Code Status: Full Code  DISPO: anticipate for tomorrow

## 2022-03-17 NOTE — PROGRESS NOTE ADULT - ATTENDING COMMENTS
Viral gastroenteritis  Hematochezia  Sepsis on Admission   Hyperglycemia      C diff  ruled out  GI pcr positive for norovirus   diet as tolerated  on bentyl for abd cramps  GI noted- outpatient complaint colonoscopy & EGD in 6-8 weeks   DM- hba1c is 7.8-- has insulin pump  Incidental Hepatic Hypodensities -MRI as outpatient & follow with GI  patient is still refusing DC-- she said [facility] will not take her as her father is ill-- DC to [facility]-- patient will appeal for DC.

## 2022-03-17 NOTE — PROGRESS NOTE ADULT - SUBJECTIVE AND OBJECTIVE BOX
CRISTI MONTGOMERY 36y Female      Patient is a 36y old  Female who presents with a chief complaint of Colitis (16 Mar 2022 15:29)        INTERVAL HPI/OVERNIGHT EVENTS: Pt appealing dc. Pt still complaining of crampy abdominal pain.     PHYSICAL EXAM:  GENERAL: NAD  HEAD:  Normocephalic  EYES:  conjunctiva and sclera clear  ENMT: Moist mucous membranes  NECK: Supple  NERVOUS SYSTEM:  Alert, awake  CHEST/LUNG: Good air exchange bilaterally, no wheeze  HEART: Regular rate and rhythm        Vital Signs Last 24 Hrs  T(C): 36.8 (17 Mar 2022 07:31), Max: 37.1 (17 Mar 2022 00:00)  T(F): 98.3 (17 Mar 2022 07:31), Max: 98.8 (17 Mar 2022 00:00)  HR: 81 (17 Mar 2022 07:31) (81 - 104)  BP: 121/71 (17 Mar 2022 07:31) (108/62 - 129/64)  BP(mean): --  RR: 18 (17 Mar 2022 07:31) (18 - 18)  SpO2: 100% (17 Mar 2022 07:31) (100% - 100%)      Consultant(s) Notes Reviewed:  [X] YES  [ ] NO  Care Discussed with Consultants/Other Providers [X] YES  [ ] NO  Imaging Personally Reviewed:  [X] YES  [ ] NO      LABS:                        10.5   6.38  )-----------( 476      ( 17 Mar 2022 07:52 )             33.1     03-17    134<L>  |  98  |  6<L>  ----------------------------<  168<H>  4.6   |  24  |  0.5<L>    Ca    9.1      17 Mar 2022 07:52  Mg     1.8     03-    TPro  6.8  /  Alb  4.0  /  TBili  <0.2  /  DBili  x   /  AST  82<H>  /  ALT  110<H>  /  AlkPhos  100  03-      Urinalysis Basic - ( 16 Mar 2022 06:10 )    Color: Yellow / Appearance: Clear / S.035 / pH: x  Gluc: x / Ketone: Small  / Bili: Negative / Urobili: <2 mg/dL   Blood: x / Protein: Trace / Nitrite: Negative   Leuk Esterase: Negative / RBC: x / WBC x   Sq Epi: x / Non Sq Epi: x / Bacteria: x        CAPILLARY BLOOD GLUCOSE      POCT Blood Glucose.: 237 mg/dL (17 Mar 2022 11:53)  POCT Blood Glucose.: 160 mg/dL (17 Mar 2022 07:51)  POCT Blood Glucose.: 286 mg/dL (16 Mar 2022 21:20)  POCT Blood Glucose.: 200 mg/dL (16 Mar 2022 16:47)

## 2022-03-18 VITALS
HEART RATE: 94 BPM | TEMPERATURE: 98 F | SYSTOLIC BLOOD PRESSURE: 111 MMHG | RESPIRATION RATE: 18 BRPM | DIASTOLIC BLOOD PRESSURE: 57 MMHG

## 2022-03-18 LAB
GLUCOSE BLDC GLUCOMTR-MCNC: 214 MG/DL — HIGH (ref 70–99)
GLUCOSE BLDC GLUCOMTR-MCNC: 215 MG/DL — HIGH (ref 70–99)
GLUCOSE BLDC GLUCOMTR-MCNC: 303 MG/DL — HIGH (ref 70–99)
GLUCOSE BLDC GLUCOMTR-MCNC: 317 MG/DL — HIGH (ref 70–99)
GLUCOSE BLDC GLUCOMTR-MCNC: 320 MG/DL — HIGH (ref 70–99)

## 2022-03-18 PROCEDURE — 99239 HOSP IP/OBS DSCHRG MGMT >30: CPT

## 2022-03-18 RX ORDER — IBUPROFEN 200 MG
400 TABLET ORAL ONCE
Refills: 0 | Status: COMPLETED | OUTPATIENT
Start: 2022-03-18 | End: 2022-03-18

## 2022-03-18 RX ORDER — TRAMADOL HYDROCHLORIDE 50 MG/1
25 TABLET ORAL ONCE
Refills: 0 | Status: DISCONTINUED | OUTPATIENT
Start: 2022-03-18 | End: 2022-03-18

## 2022-03-18 RX ADMIN — ESCITALOPRAM OXALATE 10 MILLIGRAM(S): 10 TABLET, FILM COATED ORAL at 12:28

## 2022-03-18 RX ADMIN — Medication 4: at 17:18

## 2022-03-18 RX ADMIN — Medication 13 UNIT(S): at 17:18

## 2022-03-18 RX ADMIN — Medication 4: at 10:40

## 2022-03-18 RX ADMIN — Medication 4: at 08:10

## 2022-03-18 RX ADMIN — Medication 400 MILLIGRAM(S): at 12:28

## 2022-03-18 RX ADMIN — Medication 10 MILLIGRAM(S): at 17:19

## 2022-03-18 RX ADMIN — Medication 81 MILLIGRAM(S): at 12:28

## 2022-03-18 RX ADMIN — ENOXAPARIN SODIUM 40 MILLIGRAM(S): 100 INJECTION SUBCUTANEOUS at 17:19

## 2022-03-18 RX ADMIN — Medication 10 MILLIGRAM(S): at 06:02

## 2022-03-18 RX ADMIN — Medication 10 MILLIGRAM(S): at 10:43

## 2022-03-18 RX ADMIN — Medication 25 MILLIGRAM(S): at 06:02

## 2022-03-18 RX ADMIN — Medication 400 MILLIGRAM(S): at 12:57

## 2022-03-18 RX ADMIN — Medication 13 UNIT(S): at 08:11

## 2022-03-18 RX ADMIN — Medication 13 UNIT(S): at 10:41

## 2022-03-18 RX ADMIN — Medication 1 APPLICATION(S): at 06:02

## 2022-03-18 RX ADMIN — Medication 25 MILLIGRAM(S): at 17:19

## 2022-03-18 RX ADMIN — PANTOPRAZOLE SODIUM 40 MILLIGRAM(S): 20 TABLET, DELAYED RELEASE ORAL at 06:03

## 2022-03-18 RX ADMIN — Medication 1 TABLET(S): at 12:28

## 2022-03-18 RX ADMIN — TRAMADOL HYDROCHLORIDE 25 MILLIGRAM(S): 50 TABLET ORAL at 06:07

## 2022-03-18 RX ADMIN — Medication 1 APPLICATION(S): at 17:19

## 2022-03-18 NOTE — PROGRESS NOTE ADULT - SUBJECTIVE AND OBJECTIVE BOX
SUBJECTIVE:    Patient is a 36y old Female who presents with a chief complaint of Colitis (18 Mar 2022 09:03)    Currently admitted to medicine with the primary diagnosis of Acute diarrhea       Today is hospital day 9d.     PAST MEDICAL & SURGICAL HISTORY  Neuropathy  right lower extremity, vaginal    Type 1 diabetes    Degenerative disc disease, thoracic    Urinary tract infection, recurrent    Gastroesophageal reflux disease, esophagitis presence not specified    Intractable headache, unspecified chronicity pattern, unspecified headache type    Major depressive disorder with single episode, in full remission  previously treated with zyprexa, celexa and lexapro    Tremor of right hand    Tachycardia    Spinal stenosis    No significant past surgical history      ALLERGIES:  Ceclor (Rash)  clindamycin (Hives; Nephrotoxicity)  penicillins (Hives)    MEDICATIONS:  STANDING MEDICATIONS  amitriptyline 75 milliGRAM(s) Oral at bedtime  ammonium lactate 12% Lotion 1 Application(s) Topical two times a day  aspirin  chewable 81 milliGRAM(s) Oral daily  dicyclomine 10 milliGRAM(s) Oral three times a day before meals  enoxaparin Injectable 40 milliGRAM(s) SubCutaneous every 24 hours  escitalopram 10 milliGRAM(s) Oral daily  insulin glargine Injectable (LANTUS) 33 Unit(s) SubCutaneous at bedtime  insulin lispro (ADMELOG) corrective regimen sliding scale   SubCutaneous three times a day before meals  insulin lispro Injectable (ADMELOG) 13 Unit(s) SubCutaneous three times a day before meals  metoprolol tartrate 25 milliGRAM(s) Oral two times a day  multivitamin 1 Tablet(s) Oral daily  pantoprazole    Tablet 40 milliGRAM(s) Oral before breakfast  senna 2 Tablet(s) Oral at bedtime    PRN MEDICATIONS  acetaminophen     Tablet .. 650 milliGRAM(s) Oral every 6 hours PRN    VITALS:   T(F): 98.4  HR: 94  BP: 111/57  RR: 18  SpO2: --    LABS:                        10.5   6.38  )-----------( 476      ( 17 Mar 2022 07:52 )             33.1     03-17    134<L>  |  98  |  6<L>  ----------------------------<  168<H>  4.6   |  24  |  0.5<L>    Ca    9.1      17 Mar 2022 07:52  Mg     1.8     03-17    TPro  6.8  /  Alb  4.0  /  TBili  <0.2  /  DBili  x   /  AST  82<H>  /  ALT  110<H>  /  AlkPhos  100  03-17              Culture - Urine (collected 16 Mar 2022 06:10)  Source: Clean Catch Clean Catch (Midstream)  Final Report (17 Mar 2022 11:16):    <10,000 CFU/mL Normal Urogenital Yadira          RADIOLOGY:    PHYSICAL EXAM:  GEN: No acute distress  LUNGS: Clear to auscultation bilaterally   HEART: S1/S2 present. RRR.   ABD/ GI: Soft, non-tender, non-distended. Bowel sounds present  EXT: NC/NC/NE/2+PP/JOHNSON  NEURO: AAOX3

## 2022-03-18 NOTE — PROGRESS NOTE ADULT - REASON FOR ADMISSION
Colitis

## 2022-03-18 NOTE — PROGRESS NOTE ADULT - ASSESSMENT
Viral gastroenteritis  Hematochezia  Sepsis on Admission   Hyperglycemia      C diff  ruled out  GI pcr positive for norovirus   diet as tolerated  on bentyl for abd cramps  GI noted- outpatient complaint colonoscopy & EGD in 6-8 weeks   DM- hba1c is 7.8-- has insulin pump  Incidental Hepatic Hypodensities -MRI as outpatient & follow with GI  patient is still refusing DC-- she said [facility] will not take her as her father is ill-- DC to [facility]-- patient had appealed discharge but was rejected.  Dc home --spent more than 30mins.

## 2022-03-18 NOTE — PROGRESS NOTE ADULT - PROVIDER SPECIALTY LIST ADULT
Endocrinology
Hospitalist
Internal Medicine
Endocrinology
Hospitalist
Internal Medicine
Hospitalist
Internal Medicine

## 2022-03-19 ENCOUNTER — EMERGENCY (EMERGENCY)
Facility: HOSPITAL | Age: 37
LOS: 0 days | Discharge: HOME | End: 2022-03-19
Attending: STUDENT IN AN ORGANIZED HEALTH CARE EDUCATION/TRAINING PROGRAM | Admitting: STUDENT IN AN ORGANIZED HEALTH CARE EDUCATION/TRAINING PROGRAM
Payer: MEDICAID

## 2022-03-19 VITALS
HEIGHT: 65 IN | TEMPERATURE: 99 F | OXYGEN SATURATION: 100 % | WEIGHT: 160.06 LBS | RESPIRATION RATE: 18 BRPM | HEART RATE: 105 BPM | DIASTOLIC BLOOD PRESSURE: 78 MMHG | SYSTOLIC BLOOD PRESSURE: 123 MMHG

## 2022-03-19 VITALS
SYSTOLIC BLOOD PRESSURE: 130 MMHG | DIASTOLIC BLOOD PRESSURE: 72 MMHG | OXYGEN SATURATION: 100 % | HEART RATE: 101 BPM | TEMPERATURE: 97 F | RESPIRATION RATE: 20 BRPM

## 2022-03-19 DIAGNOSIS — Z96.41 PRESENCE OF INSULIN PUMP (EXTERNAL) (INTERNAL): ICD-10-CM

## 2022-03-19 DIAGNOSIS — R19.7 DIARRHEA, UNSPECIFIED: ICD-10-CM

## 2022-03-19 DIAGNOSIS — Z88.1 ALLERGY STATUS TO OTHER ANTIBIOTIC AGENTS STATUS: ICD-10-CM

## 2022-03-19 DIAGNOSIS — R11.0 NAUSEA: ICD-10-CM

## 2022-03-19 DIAGNOSIS — Z88.7 ALLERGY STATUS TO SERUM AND VACCINE: ICD-10-CM

## 2022-03-19 DIAGNOSIS — R00.0 TACHYCARDIA, UNSPECIFIED: ICD-10-CM

## 2022-03-19 DIAGNOSIS — Z79.4 LONG TERM (CURRENT) USE OF INSULIN: ICD-10-CM

## 2022-03-19 DIAGNOSIS — I10 ESSENTIAL (PRIMARY) HYPERTENSION: ICD-10-CM

## 2022-03-19 DIAGNOSIS — E10.65 TYPE 1 DIABETES MELLITUS WITH HYPERGLYCEMIA: ICD-10-CM

## 2022-03-19 DIAGNOSIS — Z79.82 LONG TERM (CURRENT) USE OF ASPIRIN: ICD-10-CM

## 2022-03-19 DIAGNOSIS — E10.40 TYPE 1 DIABETES MELLITUS WITH DIABETIC NEUROPATHY, UNSPECIFIED: ICD-10-CM

## 2022-03-19 DIAGNOSIS — F41.9 ANXIETY DISORDER, UNSPECIFIED: ICD-10-CM

## 2022-03-19 DIAGNOSIS — R10.9 UNSPECIFIED ABDOMINAL PAIN: ICD-10-CM

## 2022-03-19 DIAGNOSIS — K52.9 NONINFECTIVE GASTROENTERITIS AND COLITIS, UNSPECIFIED: ICD-10-CM

## 2022-03-19 DIAGNOSIS — Z88.8 ALLERGY STATUS TO OTHER DRUGS, MEDICAMENTS AND BIOLOGICAL SUBSTANCES STATUS: ICD-10-CM

## 2022-03-19 DIAGNOSIS — Z88.0 ALLERGY STATUS TO PENICILLIN: ICD-10-CM

## 2022-03-19 DIAGNOSIS — A08.11 ACUTE GASTROENTEROPATHY DUE TO NORWALK AGENT: ICD-10-CM

## 2022-03-19 LAB
ALBUMIN SERPL ELPH-MCNC: 4.6 G/DL — SIGNIFICANT CHANGE UP (ref 3.5–5.2)
ALP SERPL-CCNC: 111 U/L — SIGNIFICANT CHANGE UP (ref 30–115)
ALT FLD-CCNC: 103 U/L — HIGH (ref 0–41)
ANION GAP SERPL CALC-SCNC: 13 MMOL/L — SIGNIFICANT CHANGE UP (ref 7–14)
APPEARANCE UR: CLEAR — SIGNIFICANT CHANGE UP
AST SERPL-CCNC: 50 U/L — HIGH (ref 0–41)
B-OH-BUTYR SERPL-SCNC: 0.6 MMOL/L — HIGH
BASE EXCESS BLDV CALC-SCNC: 0.2 MMOL/L — SIGNIFICANT CHANGE UP (ref -2–3)
BASOPHILS # BLD AUTO: 0.05 K/UL — SIGNIFICANT CHANGE UP (ref 0–0.2)
BASOPHILS NFR BLD AUTO: 0.5 % — SIGNIFICANT CHANGE UP (ref 0–1)
BILIRUB DIRECT SERPL-MCNC: <0.2 MG/DL — SIGNIFICANT CHANGE UP (ref 0–0.3)
BILIRUB INDIRECT FLD-MCNC: >0 MG/DL — LOW (ref 0.2–1.2)
BILIRUB SERPL-MCNC: 0.2 MG/DL — SIGNIFICANT CHANGE UP (ref 0.2–1.2)
BILIRUB UR-MCNC: NEGATIVE — SIGNIFICANT CHANGE UP
BUN SERPL-MCNC: 6 MG/DL — LOW (ref 10–20)
CA-I SERPL-SCNC: 1.26 MMOL/L — SIGNIFICANT CHANGE UP (ref 1.15–1.33)
CALCIUM SERPL-MCNC: 9.8 MG/DL — SIGNIFICANT CHANGE UP (ref 8.5–10.1)
CHLORIDE SERPL-SCNC: 98 MMOL/L — SIGNIFICANT CHANGE UP (ref 98–110)
CO2 SERPL-SCNC: 23 MMOL/L — SIGNIFICANT CHANGE UP (ref 17–32)
COLOR SPEC: SIGNIFICANT CHANGE UP
CREAT SERPL-MCNC: 0.5 MG/DL — LOW (ref 0.7–1.5)
DIFF PNL FLD: NEGATIVE — SIGNIFICANT CHANGE UP
EGFR: 125 ML/MIN/1.73M2 — SIGNIFICANT CHANGE UP
EOSINOPHIL # BLD AUTO: 0.02 K/UL — SIGNIFICANT CHANGE UP (ref 0–0.7)
EOSINOPHIL NFR BLD AUTO: 0.2 % — SIGNIFICANT CHANGE UP (ref 0–8)
GAS PNL BLDV: 129 MMOL/L — LOW (ref 136–145)
GAS PNL BLDV: SIGNIFICANT CHANGE UP
GAS PNL BLDV: SIGNIFICANT CHANGE UP
GLUCOSE SERPL-MCNC: 232 MG/DL — HIGH (ref 70–99)
GLUCOSE UR QL: ABNORMAL
HCO3 BLDV-SCNC: 27 MMOL/L — SIGNIFICANT CHANGE UP (ref 22–29)
HCT VFR BLD CALC: 30.7 % — LOW (ref 37–47)
HCT VFR BLDA CALC: 39 % — SIGNIFICANT CHANGE UP (ref 39–51)
HGB BLD CALC-MCNC: 13 G/DL — SIGNIFICANT CHANGE UP (ref 12.6–17.4)
HGB BLD-MCNC: 9.9 G/DL — LOW (ref 12–16)
IMM GRANULOCYTES NFR BLD AUTO: 0.7 % — HIGH (ref 0.1–0.3)
KETONES UR-MCNC: ABNORMAL
LACTATE BLDV-MCNC: 1.8 MMOL/L — SIGNIFICANT CHANGE UP (ref 0.5–2)
LEUKOCYTE ESTERASE UR-ACNC: NEGATIVE — SIGNIFICANT CHANGE UP
LIDOCAIN IGE QN: 18 U/L — SIGNIFICANT CHANGE UP (ref 7–60)
LYMPHOCYTES # BLD AUTO: 1.45 K/UL — SIGNIFICANT CHANGE UP (ref 1.2–3.4)
LYMPHOCYTES # BLD AUTO: 15.9 % — LOW (ref 20.5–51.1)
MAGNESIUM SERPL-MCNC: 1.8 MG/DL — SIGNIFICANT CHANGE UP (ref 1.8–2.4)
MCHC RBC-ENTMCNC: 25.1 PG — LOW (ref 27–31)
MCHC RBC-ENTMCNC: 32.2 G/DL — SIGNIFICANT CHANGE UP (ref 32–37)
MCV RBC AUTO: 77.7 FL — LOW (ref 81–99)
MONOCYTES # BLD AUTO: 0.63 K/UL — HIGH (ref 0.1–0.6)
MONOCYTES NFR BLD AUTO: 6.9 % — SIGNIFICANT CHANGE UP (ref 1.7–9.3)
NEUTROPHILS # BLD AUTO: 6.9 K/UL — HIGH (ref 1.4–6.5)
NEUTROPHILS NFR BLD AUTO: 75.8 % — HIGH (ref 42.2–75.2)
NITRITE UR-MCNC: NEGATIVE — SIGNIFICANT CHANGE UP
NRBC # BLD: 0 /100 WBCS — SIGNIFICANT CHANGE UP (ref 0–0)
PCO2 BLDV: 51 MMHG — HIGH (ref 39–42)
PH BLDV: 7.33 — SIGNIFICANT CHANGE UP (ref 7.32–7.43)
PH UR: 6 — SIGNIFICANT CHANGE UP (ref 5–8)
PLATELET # BLD AUTO: 524 K/UL — HIGH (ref 130–400)
PO2 BLDV: 31 MMHG — SIGNIFICANT CHANGE UP
POTASSIUM BLDV-SCNC: 4.3 MMOL/L — SIGNIFICANT CHANGE UP (ref 3.5–5.1)
POTASSIUM SERPL-MCNC: 4.5 MMOL/L — SIGNIFICANT CHANGE UP (ref 3.5–5)
POTASSIUM SERPL-SCNC: 4.5 MMOL/L — SIGNIFICANT CHANGE UP (ref 3.5–5)
PROT SERPL-MCNC: 7.5 G/DL — SIGNIFICANT CHANGE UP (ref 6–8)
PROT UR-MCNC: NEGATIVE — SIGNIFICANT CHANGE UP
RBC # BLD: 3.95 M/UL — LOW (ref 4.2–5.4)
RBC # FLD: 16.2 % — HIGH (ref 11.5–14.5)
SAO2 % BLDV: 49.1 % — SIGNIFICANT CHANGE UP
SODIUM SERPL-SCNC: 134 MMOL/L — LOW (ref 135–146)
SP GR SPEC: 1.01 — LOW (ref 1.01–1.03)
UROBILINOGEN FLD QL: SIGNIFICANT CHANGE UP
WBC # BLD: 9.11 K/UL — SIGNIFICANT CHANGE UP (ref 4.8–10.8)
WBC # FLD AUTO: 9.11 K/UL — SIGNIFICANT CHANGE UP (ref 4.8–10.8)

## 2022-03-19 PROCEDURE — 93010 ELECTROCARDIOGRAM REPORT: CPT

## 2022-03-19 PROCEDURE — 99285 EMERGENCY DEPT VISIT HI MDM: CPT

## 2022-03-19 RX ORDER — ACETAMINOPHEN 500 MG
975 TABLET ORAL ONCE
Refills: 0 | Status: COMPLETED | OUTPATIENT
Start: 2022-03-19 | End: 2022-03-19

## 2022-03-19 RX ORDER — SODIUM CHLORIDE 9 MG/ML
2000 INJECTION, SOLUTION INTRAVENOUS ONCE
Refills: 0 | Status: COMPLETED | OUTPATIENT
Start: 2022-03-19 | End: 2022-03-19

## 2022-03-19 RX ORDER — ONDANSETRON 8 MG/1
4 TABLET, FILM COATED ORAL ONCE
Refills: 0 | Status: COMPLETED | OUTPATIENT
Start: 2022-03-19 | End: 2022-03-19

## 2022-03-19 RX ADMIN — ONDANSETRON 4 MILLIGRAM(S): 8 TABLET, FILM COATED ORAL at 14:10

## 2022-03-19 RX ADMIN — Medication 10 MILLIGRAM(S): at 14:21

## 2022-03-19 RX ADMIN — Medication 975 MILLIGRAM(S): at 14:10

## 2022-03-19 RX ADMIN — SODIUM CHLORIDE 2000 MILLILITER(S): 9 INJECTION, SOLUTION INTRAVENOUS at 14:09

## 2022-03-19 NOTE — ED PROVIDER NOTE - NS ED ATTENDING STATEMENT MOD
This was a shared visit with the JABARI. I reviewed and verified the documentation and independently performed the documented:

## 2022-03-19 NOTE — ED PROVIDER NOTE - OBJECTIVE STATEMENT
Check 1 check nonsequential: This is check 1 check 1 check 1 check line check to check check check to check check 36-year-old female past medical history type 1 diabetes on insulin pump, diabetic neuropathy, anxiety, pseudoseizures, hypertension, chronic back pain, with recent admission for diarrhea, abdominal pain, hyperglycemia, with stool sample positive for norovirus, p/w persistent abdominal crampy pain, loose stool, and elevated fingerstick.  Patient can maintain p.o. but has decreased appetite and generally feels unwell.  + nausea. No fever, shortness of breath, bloody stool, or vomiting. 36-year-old female past medical history type 1 diabetes on insulin pump, diabetic neuropathy, anxiety, pseudoseizures, hypertension, chronic back pain, with recent admission for diarrhea, abdominal pain, hyperglycemia, with stool sample positive for norovirus, p/w persistent abdominal crampy pain, loose stool, and elevated fingerstick.  Patient can maintain p.o. but has decreased appetite and generally feels unwell.  + nausea. No fever, shortness of breath, bloody stool, or vomiting.

## 2022-03-19 NOTE — ED ADULT TRIAGE NOTE - NS ED TRIAGE AVPU SCALE
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 SUBJECTIVE:   Kneyetta Butler has now received 3600 cGy in 16/25 fractions directed to the larynx. Seen after completing today XRT treatment. Accompanied by his brother Perla Elliott into exam room. Patient denies skin complaints. Patient continues to eat solid foods/ drink liquids by mouth. Reports early start of odynophagia, denies dysphagia. No fevers, chills, nausea or decreased appetite. Patient follows with Dr. Leonela Cortes for Otolaryngology. Past medical, surgical, social and family histories reviewed and updated as indicated. Pain: odynophagia     ALLERGIES:  Patient has no known allergies. Current Outpatient Medications   Medication Sig Dispense Refill    finasteride (PROSCAR) 5 MG tablet Take 1 tablet every day by oral route.       spironolactone (ALDACTONE) 25 MG tablet       SITagliptin (JANUVIA) 100 MG tablet Take 100 mg by mouth Daily with lunch      furosemide (LASIX) 40 MG tablet Take 20 mg by mouth every morning       Colchicine (MITIGARE) 0.6 MG CAPS Take 1 capsule by mouth every evening       albuterol sulfate  (90 Base) MCG/ACT inhaler Inhale 2 puffs into the lungs every 6 hours as needed for Wheezing or Shortness of Breath       apixaban (ELIQUIS) 5 MG TABS tablet Take 5 mg by mouth 2 times daily       atorvastatin (LIPITOR) 20 MG tablet Take 20 mg by mouth nightly       glimepiride (AMARYL) 2 MG tablet Take 2 mg by mouth Daily with supper       potassium chloride (KLOR-CON M) 20 MEQ extended release tablet Take 20 mEq by mouth Daily with lunch       insulin detemir (LEVEMIR) 100 UNIT/ML injection Inject 24 Units into the skin every morning (before breakfast)       ramipril (ALTACE) 5 MG capsule Take 10 mg by mouth every morning
Alert-The patient is alert, awake and responds to voice. The patient is oriented to time, place, and person. The triage nurse is able to obtain subjective information.

## 2022-03-19 NOTE — ED PROVIDER NOTE - PHYSICAL EXAMINATION
Gen: NAD  Head: NCAT  ENT: MMM  Eyes: NL inspection  Neck: supple  Cardio: RRR  Pulm: No resp distress  Abd: Soft, + mild diffuse ttp, no r/g  Extrem: No pedal edema  Neuro: Grossly intact  Psyche: cooperative

## 2022-03-19 NOTE — ED PROVIDER NOTE - NS ED ROS FT
Constitutional:  See HPI  Eyes:  No visual changes  ENMT: No neck pain or stiffness  Cardiac:  No chest pain  Respiratory:  No cough or respiratory distress.   GI:  See HPI  :  No frequency. See HPI  MS:  No back pain.  Neuro:  No headache   Skin:  No skin rash  Except as documented in the HPI,  all other systems are negative

## 2022-03-19 NOTE — ED ADULT TRIAGE NOTE - CHIEF COMPLAINT QUOTE
"I woke up dizzy, diarrhea. I took my sugar it was 500. I have type 1 diabetes. im also having chest pain for about 9 days " fs triage 279

## 2022-03-19 NOTE — ED PROVIDER NOTE - CLINICAL SUMMARY MEDICAL DECISION MAKING FREE TEXT BOX
.    Pt w/ decreased PO, hyperglycemia, diarrhea. Labs reviewed. Pt not in DKA. PO tolerant in ED. EKG NL sinus, similar to prior.  Pt walking around ED w/o distress. IMP: diarrhea/ norovirus. Pt stable for dc w/ continued oupt w/up, pmd f/up, and care as discussed.  Pt understands plan and signs and symptoms for ED return. DC home.     .

## 2022-03-19 NOTE — ED PROVIDER NOTE - ATTENDING CONTRIBUTION TO CARE
Check 1 check nonsequential: This is check 1 check 1 check 1 check line check to check check check to check check 36-year-old female past medical history type 1 diabetes on insulin pump, diabetic neuropathy, anxiety, pseudoseizures, hypertension, chronic back pain, with recent admission for diarrhea, abdominal pain, hyperglycemia, with stool sample positive for norovirus, p/w persistent abdominal crampy pain, loose stool, and elevated fingerstick.  Patient can maintain p.o. but has decreased appetite and generally feels unwell.  + nausea . No fever, shortness of breath, bloody stool, or vomiting.    Gen: NAD  Head: NCAT  ENT: MMM  Eyes: NL inspection  Neck: supple  Cardio: RRR  Pulm: No resp distress  Abd: Soft, + mild diffuse ttp, no r/g  Extrem: No pedal edema  Neuro: Grossly intact  Psyche: cooperative    IMP: decreased PO, abd pain. hyperglycemia  P: labs, ivf, ua, zofran, Bentyl, reassess. 36-year-old female past medical history type 1 diabetes on insulin pump, diabetic neuropathy, anxiety, pseudoseizures, hypertension, chronic back pain, with recent admission for diarrhea, abdominal pain, hyperglycemia, with stool sample positive for norovirus, p/w persistent abdominal crampy pain, loose stool, and elevated fingerstick.  Patient can maintain p.o. but has decreased appetite and generally feels unwell.  + nausea . No fever, shortness of breath, bloody stool, or vomiting.    Gen: NAD  Head: NCAT  ENT: MMM  Eyes: NL inspection  Neck: supple  Cardio: RRR  Pulm: No resp distress  Abd: Soft, + mild diffuse ttp, no r/g  Extrem: No pedal edema  Neuro: Grossly intact  Psyche: cooperative    IMP: decreased PO, abd pain. hyperglycemia  P: labs, ivf, ua, zofran, Bentyl, reassess. 36-year-old female past medical history type 1 diabetes on insulin pump, diabetic neuropathy, anxiety, pseudoseizures, hypertension, chronic back pain, with recent admission for diarrhea, abdominal pain, hyperglycemia, with stool sample positive for norovirus, p/w persistent abdominal crampy pain, loose stool, and elevated fingerstick.  Patient can maintain p.o. but has decreased appetite and generally feels unwell.  + nausea . No fever, shortness of breath, bloody stool, or vomiting. Pt c/o also constant chest discomfort that is unchanged > 1 wk.    Gen: NAD  Head: NCAT  ENT: MMM  Eyes: NL inspection  Neck: supple  Cardio: RRR  Pulm: No resp distress  Abd: Soft, + mild diffuse ttp, no r/g  Extrem: No pedal edema  Neuro: Grossly intact  Psyche: cooperative    IMP: decreased PO, abd pain. hyperglycemia  P: labs, ivf, ua, zofran, EKG, Bentyl, reassess.

## 2022-03-19 NOTE — ED ADULT NURSE NOTE - NSIMPLEMENTINTERV_GEN_ALL_ED
Implemented All Universal Safety Interventions:  [place] to call system. Call bell, personal items and telephone within reach. Instruct patient to call for assistance. Room bathroom lighting operational. Non-slip footwear when patient is off stretcher. Physically safe environment: no spills, clutter or unnecessary equipment. Stretcher in lowest position, wheels locked, appropriate side rails in place.

## 2022-03-19 NOTE — ED PROVIDER NOTE - PATIENT PORTAL LINK FT
You can access the FollowMyHealth Patient Portal offered by [facility] by registering at the following website: [email or web address]. By joining [employer]’s FollowMyHealth portal, you will also be able to view your health information using other applications (apps) compatible with our system.

## 2022-03-20 LAB
CULTURE RESULTS: SIGNIFICANT CHANGE UP
SPECIMEN SOURCE: SIGNIFICANT CHANGE UP

## 2022-03-21 LAB — LACTOFERRIN STL-MCNC: 353.11 — HIGH (ref 0–7.24)

## 2022-03-22 ENCOUNTER — EMERGENCY (EMERGENCY)
Facility: HOSPITAL | Age: 37
LOS: 0 days | Discharge: HOME | End: 2022-03-22
Attending: EMERGENCY MEDICINE | Admitting: EMERGENCY MEDICINE
Payer: MEDICAID

## 2022-03-22 ENCOUNTER — APPOINTMENT (OUTPATIENT)
Dept: ENDOCRINOLOGY | Facility: CLINIC | Age: 37
End: 2022-03-22
Payer: MEDICAID

## 2022-03-22 VITALS
RESPIRATION RATE: 18 BRPM | OXYGEN SATURATION: 99 % | SYSTOLIC BLOOD PRESSURE: 131 MMHG | TEMPERATURE: 98 F | HEART RATE: 88 BPM | DIASTOLIC BLOOD PRESSURE: 72 MMHG

## 2022-03-22 VITALS
HEART RATE: 97 BPM | WEIGHT: 154.1 LBS | RESPIRATION RATE: 18 BRPM | OXYGEN SATURATION: 100 % | DIASTOLIC BLOOD PRESSURE: 66 MMHG | HEIGHT: 65 IN | SYSTOLIC BLOOD PRESSURE: 128 MMHG

## 2022-03-22 DIAGNOSIS — E10.40 TYPE 1 DIABETES MELLITUS WITH DIABETIC NEUROPATHY, UNSPECIFIED: ICD-10-CM

## 2022-03-22 DIAGNOSIS — Z88.7 ALLERGY STATUS TO SERUM AND VACCINE: ICD-10-CM

## 2022-03-22 DIAGNOSIS — R11.2 NAUSEA WITH VOMITING, UNSPECIFIED: ICD-10-CM

## 2022-03-22 DIAGNOSIS — R55 SYNCOPE AND COLLAPSE: ICD-10-CM
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
## 2022-03-22 DIAGNOSIS — Z87.39 PERSONAL HISTORY OF OTHER DISEASES OF THE MUSCULOSKELETAL SYSTEM AND CONNECTIVE TISSUE: ICD-10-CM

## 2022-03-22 DIAGNOSIS — R19.7 DIARRHEA, UNSPECIFIED: ICD-10-CM

## 2022-03-22 DIAGNOSIS — Z88.0 ALLERGY STATUS TO PENICILLIN: ICD-10-CM

## 2022-03-22 DIAGNOSIS — Z88.8 ALLERGY STATUS TO OTHER DRUGS, MEDICAMENTS AND BIOLOGICAL SUBSTANCES: ICD-10-CM

## 2022-03-22 DIAGNOSIS — K21.9 GASTRO-ESOPHAGEAL REFLUX DISEASE WITHOUT ESOPHAGITIS: ICD-10-CM

## 2022-03-22 DIAGNOSIS — Z88.1 ALLERGY STATUS TO OTHER ANTIBIOTIC AGENTS STATUS: ICD-10-CM

## 2022-03-22 DIAGNOSIS — Z79.82 LONG TERM (CURRENT) USE OF ASPIRIN: ICD-10-CM

## 2022-03-22 DIAGNOSIS — F32.9 MAJOR DEPRESSIVE DISORDER, SINGLE EPISODE, UNSPECIFIED: ICD-10-CM
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
## 2022-03-22 DIAGNOSIS — Z79.4 LONG TERM (CURRENT) USE OF INSULIN: ICD-10-CM

## 2022-03-22 DIAGNOSIS — Z96.41 PRESENCE OF INSULIN PUMP (EXTERNAL) (INTERNAL): ICD-10-CM

## 2022-03-22 LAB
ALBUMIN SERPL ELPH-MCNC: 4.2 G/DL — SIGNIFICANT CHANGE UP (ref 3.5–5.2)
ALP SERPL-CCNC: 95 U/L — SIGNIFICANT CHANGE UP (ref 30–115)
ALT FLD-CCNC: 75 U/L — HIGH (ref 0–41)
ANION GAP SERPL CALC-SCNC: 11 MMOL/L — SIGNIFICANT CHANGE UP (ref 7–14)
AST SERPL-CCNC: 30 U/L — SIGNIFICANT CHANGE UP (ref 0–41)
B-OH-BUTYR SERPL-SCNC: 0.3 MMOL/L — SIGNIFICANT CHANGE UP
BASE EXCESS BLDV CALC-SCNC: 1.4 MMOL/L — SIGNIFICANT CHANGE UP (ref -2–3)
BASOPHILS # BLD AUTO: 0.03 K/UL — SIGNIFICANT CHANGE UP (ref 0–0.2)
BASOPHILS NFR BLD AUTO: 0.4 % — SIGNIFICANT CHANGE UP (ref 0–1)
BILIRUB SERPL-MCNC: <0.2 MG/DL — SIGNIFICANT CHANGE UP (ref 0.2–1.2)
BUN SERPL-MCNC: 5 MG/DL — LOW (ref 10–20)
CA-I SERPL-SCNC: 1.23 MMOL/L — SIGNIFICANT CHANGE UP (ref 1.15–1.33)
CALCIUM SERPL-MCNC: 9 MG/DL — SIGNIFICANT CHANGE UP (ref 8.5–10.1)
CHLORIDE SERPL-SCNC: 99 MMOL/L — SIGNIFICANT CHANGE UP (ref 98–110)
CO2 SERPL-SCNC: 24 MMOL/L — SIGNIFICANT CHANGE UP (ref 17–32)
CREAT SERPL-MCNC: 0.5 MG/DL — LOW (ref 0.7–1.5)
EGFR: 125 ML/MIN/1.73M2 — SIGNIFICANT CHANGE UP
EOSINOPHIL # BLD AUTO: 0.04 K/UL — SIGNIFICANT CHANGE UP (ref 0–0.7)
EOSINOPHIL NFR BLD AUTO: 0.5 % — SIGNIFICANT CHANGE UP (ref 0–8)
GAS PNL BLDV: 135 MMOL/L — LOW (ref 136–145)
GAS PNL BLDV: SIGNIFICANT CHANGE UP
GLUCOSE BLDC GLUCOMTR-MCNC: 264
GLUCOSE SERPL-MCNC: 218 MG/DL — HIGH (ref 70–99)
HCG SERPL QL: NEGATIVE — SIGNIFICANT CHANGE UP
HCO3 BLDV-SCNC: 27 MMOL/L — SIGNIFICANT CHANGE UP (ref 22–29)
HCT VFR BLD CALC: 28.1 % — LOW (ref 37–47)
HCT VFR BLDA CALC: 28 % — LOW (ref 39–51)
HGB BLD CALC-MCNC: 9.2 G/DL — LOW (ref 12.6–17.4)
HGB BLD-MCNC: 8.7 G/DL — LOW (ref 12–16)
IMM GRANULOCYTES NFR BLD AUTO: 0.4 % — HIGH (ref 0.1–0.3)
LACTATE BLDV-MCNC: 1.2 MMOL/L — SIGNIFICANT CHANGE UP (ref 0.5–2)
LIDOCAIN IGE QN: 16 U/L — SIGNIFICANT CHANGE UP (ref 7–60)
LYMPHOCYTES # BLD AUTO: 1.61 K/UL — SIGNIFICANT CHANGE UP (ref 1.2–3.4)
LYMPHOCYTES # BLD AUTO: 19.4 % — LOW (ref 20.5–51.1)
MAGNESIUM SERPL-MCNC: 1.8 MG/DL — SIGNIFICANT CHANGE UP (ref 1.8–2.4)
MCHC RBC-ENTMCNC: 24.4 PG — LOW (ref 27–31)
MCHC RBC-ENTMCNC: 31 G/DL — LOW (ref 32–37)
MCV RBC AUTO: 78.9 FL — LOW (ref 81–99)
MONOCYTES # BLD AUTO: 0.89 K/UL — HIGH (ref 0.1–0.6)
MONOCYTES NFR BLD AUTO: 10.7 % — HIGH (ref 1.7–9.3)
NEUTROPHILS # BLD AUTO: 5.69 K/UL — SIGNIFICANT CHANGE UP (ref 1.4–6.5)
NEUTROPHILS NFR BLD AUTO: 68.6 % — SIGNIFICANT CHANGE UP (ref 42.2–75.2)
NRBC # BLD: 0 /100 WBCS — SIGNIFICANT CHANGE UP (ref 0–0)
PCO2 BLDV: 44 MMHG — HIGH (ref 39–42)
PLATELET # BLD AUTO: 565 K/UL — HIGH (ref 130–400)
PO2 BLDV: 46 MMHG — SIGNIFICANT CHANGE UP
POTASSIUM BLDV-SCNC: 4 MMOL/L — SIGNIFICANT CHANGE UP (ref 3.5–5.1)
POTASSIUM SERPL-MCNC: 4 MMOL/L — SIGNIFICANT CHANGE UP (ref 3.5–5)
POTASSIUM SERPL-SCNC: 4 MMOL/L — SIGNIFICANT CHANGE UP (ref 3.5–5)
PROT SERPL-MCNC: 7.1 G/DL — SIGNIFICANT CHANGE UP (ref 6–8)
RBC # BLD: 3.56 M/UL — LOW (ref 4.2–5.4)
RBC # FLD: 16.1 % — HIGH (ref 11.5–14.5)
SAO2 % BLDV: 81.9 % — SIGNIFICANT CHANGE UP
SODIUM SERPL-SCNC: 134 MMOL/L — LOW (ref 135–146)
TROPONIN T SERPL-MCNC: <0.01 NG/ML — SIGNIFICANT CHANGE UP
WBC # BLD: 8.29 K/UL — SIGNIFICANT CHANGE UP (ref 4.8–10.8)
WBC # FLD AUTO: 8.29 K/UL — SIGNIFICANT CHANGE UP (ref 4.8–10.8)

## 2022-03-22 PROCEDURE — 93010 ELECTROCARDIOGRAM REPORT: CPT

## 2022-03-22 PROCEDURE — 99285 EMERGENCY DEPT VISIT HI MDM: CPT

## 2022-03-22 PROCEDURE — 82962 GLUCOSE BLOOD TEST: CPT

## 2022-03-22 RX ORDER — SODIUM CHLORIDE 9 MG/ML
2200 INJECTION, SOLUTION INTRAVENOUS ONCE
Refills: 0 | Status: COMPLETED | OUTPATIENT
Start: 2022-03-22 | End: 2022-03-22

## 2022-03-22 RX ORDER — ONDANSETRON 8 MG/1
4 TABLET, FILM COATED ORAL ONCE
Refills: 0 | Status: COMPLETED | OUTPATIENT
Start: 2022-03-22 | End: 2022-03-22

## 2022-03-22 RX ADMIN — ONDANSETRON 4 MILLIGRAM(S): 8 TABLET, FILM COATED ORAL at 15:55

## 2022-03-22 RX ADMIN — SODIUM CHLORIDE 2200 MILLILITER(S): 9 INJECTION, SOLUTION INTRAVENOUS at 15:55

## 2022-03-22 NOTE — ED PROVIDER NOTE - ATTENDING CONTRIBUTION TO CARE
36y female well known to ED sent from endocrinologist's office s/p syncope, pt reports persistent n/v/d (was admitted for same, discharged after appeal, lives at [facility]), on exam vital signs appreciated, nontoxic, head nc/at, perrla, EOMI, conj pink op clear neck supple cor rrr lungs cta abd snt no c/c/e pulses equal calves nontender neuro intact, will check ekg, labs, ivf, reassess
no

## 2022-03-22 NOTE — ED ADULT NURSE NOTE - NSIMPLEMENTINTERV_GEN_ALL_ED
Implemented All Fall with Harm Risk Interventions:  [place] to call system. Call bell, personal items and telephone within reach. Instruct patient to call for assistance. Room bathroom lighting operational. Non-slip footwear when patient is off stretcher. Physically safe environment: no spills, clutter or unnecessary equipment. Stretcher in lowest position, wheels locked, appropriate side rails in place. Provide visual cue, wrist band, yellow gown, etc. Monitor gait and stability. Monitor for mental status changes and reorient to person, place, and time. Review medications for side effects contributing to fall risk. Reinforce activity limits and safety measures with patient and family. Provide visual clues: red socks.

## 2022-03-22 NOTE — ED ADULT NURSE NOTE - CHIEF COMPLAINT QUOTE
Pt was at doctors office and was walking with a walker, felt dizzy and lightheaded and fell forewards hitting the L side of head and her L wrist against the walker. No LOC  pt also states she fell this morning at "[employer]"

## 2022-03-22 NOTE — ED PROVIDER NOTE - PATIENT PORTAL LINK FT
You can access the FollowMyHealth Patient Portal offered by [facility] by registering at the following website: [email or web address]. By joining [employer]’s FollowMyHealth portal, you will also be able to view your health information using other applications (apps) compatible with our system.

## 2022-03-22 NOTE — ED ADULT TRIAGE NOTE - CHIEF COMPLAINT QUOTE
Pt was at doctors office and was walking with a walker, felt dizzy and lightheaded and fell forewards hitting the L side of head and her L wrist against the walker. No LOC  pt also states she fell this morning at [employer]

## 2022-03-22 NOTE — ED PROVIDER NOTE - NSFOLLOWUPINSTRUCTIONS_ED_ALL_ED_FT
MAKE AN APPOINTMENT WITH YOUR GASTROENTEROLOGIST. DRINK PEDIALITE AND EAT BREAD/APPLES/RICE/SOUP.      Viral Gastroenteritis, Adult       Viral gastroenteritis is also known as the stomach flu. This condition may affect your stomach, your small intestine, and your large intestine. It can cause sudden watery poop (diarrhea), fever, and throwing up (vomiting). This condition is caused by certain germs (viruses). These germs can be passed from person to person very easily (are contagious).    Having watery poop and throwing up can make you feel weak and cause you to not have enough water in your body (get dehydrated). This can make you tired and thirsty, make you have a dry mouth, and make it so you pee (urinate) less often. It is important to replace the fluids that you lose from having watery poop and throwing up.      What are the causes?    •You can get sick by catching viruses from other people.    •You can also get sick by:  •Eating food, drinking water, or touching a surface that has the viruses on it (is contaminated).      •Sharing utensils or other personal items with a person who is sick.          What increases the risk?    •Having a weak body defense system (immune system).      •Living with one or more children who are younger than 2 years old.      •Living in a nursing home.      •Going on cruise ships.        What are the signs or symptoms?    Symptoms of this condition start suddenly. Symptoms may last for a few days or for as long as a week.•Common symptoms include:  •Watery poop.      •Throwing up.      •Other symptoms include:  •Fever.      •Headache.      •Feeling tired (fatigue).      •Pain in the belly (abdomen).      •Chills.      •Feeling weak.      •Feeling sick to your stomach (nauseous).      •Muscle aches.      •Not feeling hungry.          How is this treated?    •This condition typically goes away on its own.    •The focus of treatment is to replace the fluids that you lose. This condition may be treated with:  •An ORS (oral rehydration solution). This is a drink that is sold at pharmacies and stores.      •Medicines to help with your symptoms.      •Probiotic supplements to reduce symptoms of diarrhea.      •Fluids given through an IV tube, if needed.        •Older adults and people with other diseases or a weak body defense system are at higher risk for not having enough water in the body.        Follow these instructions at home:      Eating and drinking      •Take an ORS as told by your doctor.    •Drink clear fluids in small amounts as you are able. Clear fluids include:  •Water.      •Ice chips.      •Fruit juice with water added to it (diluted).      •Low-calorie sports drinks.        •Drink enough fluid to keep your pee (urine) pale yellow.      •Eat small amounts of healthy foods every 3–4 hours as you are able. This may include whole grains, fruits, vegetables, lean meats, and yogurt.      •Avoid fluids that have a lot of sugar or caffeine in them, such as energy drinks, sports drinks, and soda.      •Avoid spicy or fatty foods.      •Avoid alcohol.        General instructions      •Wash your hands often. This is very important after you have watery poop or you throw up. If you cannot use soap and water, use hand .      •Make sure that all people in your home wash their hands well and often.      •Take over-the-counter and prescription medicines only as told by your doctor.      •Rest at home while you get better.      •Watch your condition for any changes.      •Take a warm bath to help with any burning or pain from having watery poop.      •Keep all follow-up visits as told by your doctor. This is important.        Contact a doctor if:    •You cannot keep fluids down.      •Your symptoms get worse.      •You have new symptoms.      •You feel light-headed.      •You feel dizzy.      •You have muscle cramps.        Get help right away if:    •You have chest pain.      •You feel very weak.      •You pass out (faint).      •You see blood in your throw-up.      •Your throw-up looks like coffee grounds.      •You have bloody or black poop (stools) or poop that looks like tar.      •You have a very bad headache, or a stiff neck, or both.      •You have a rash.      •You have very bad pain, cramping, or bloating in your belly.      •You have trouble breathing.      •You are breathing very quickly.      •You have a fast heartbeat.      •Your skin feels cold and clammy.      •You feel mixed up (confused).      •You have pain when you pee.    •You have signs of not having enough water in the body, such as:  •Dark pee, hardly any pee, or no pee.      •Cracked lips.      •Dry mouth.      •Sunken eyes.      •Feeling very sleepy.      •Feeling weak.          Summary    •Viral gastroenteritis is also known as the stomach flu.      •This condition can cause sudden watery poop (diarrhea), fever, and throwing up (vomiting).      •These germs can be passed from person to person very easily.      •Take an ORS as told by your doctor. This is a drink that is sold at pharmacies and stores.      •Drink fluids in small amounts many times each day as you are able.      This information is not intended to replace advice given to you by

## 2022-03-22 NOTE — ED PROVIDER NOTE - CLINICAL SUMMARY MEDICAL DECISION MAKING FREE TEXT BOX
Labs obtained.  Clinically pt not in DKA.  Labs confirm,  Pt treated with IVFs.  Will need to cont oral hydration.  will d/c to residence.  Pt to f/u with PMD

## 2022-03-22 NOTE — ED PROVIDER NOTE - PHYSICAL EXAMINATION
Physical Exam    Vital Signs: I have reviewed the initial vital signs.  Constitutional: well-nourished, appears stated age, no acute distress, nontoxic appearing  Head: NCAT no crepitus or tenderness  Eyes: Conjunctiva pink, Sclera clear, PERRLA  Cardiovascular: S1 and S2, regular rate, regular rhythm, well-perfused extremities, radial pulses equal and 2+, calves nonttp, equal in size  Respiratory: unlabored respiratory effort, speaking in full sentences, handling oral secretions, , clear to auscultation bilaterally no wheezing, rales and rhonchi  Gastrointestinal: soft, non-tender abdomen, no pulsatile mass, normal bowl sounds  Musculoskeletal: supple neck, no lower extremity edema,   Integumentary: warm, dry, no rashes, lacerations,  Neurologic: awake, alert

## 2022-03-22 NOTE — ED PROVIDER NOTE - CARE PROVIDER_API CALL
Shaka Saab)  Gastroenterology; Internal Medicine  [address] [place] [address]  Phone: [phone]  Fax: [phone]  Follow Up Time:

## 2022-03-22 NOTE — ED PROVIDER NOTE - OBJECTIVE STATEMENT
36 y.o. F,  pmh of DMI insulin pump dependent,. recently d/c'd from hospital for colitis, here for mechanical trip and fall while in endocrinology office. Pt denies LOC, HA, vision loss, paresthesias, neck pain back pain urinary/bowel incontinence. +N/V yesterday. + Diarrhea continued ( recently dx'd with noro virus). No cp or sob

## 2022-03-24 ENCOUNTER — APPOINTMENT (OUTPATIENT)
Dept: PSYCHIATRY | Facility: CLINIC | Age: 37
End: 2022-03-24
Payer: MEDICAID

## 2022-03-24 ENCOUNTER — OUTPATIENT (OUTPATIENT)
Dept: OUTPATIENT SERVICES | Facility: HOSPITAL | Age: 37
LOS: 1 days | Discharge: HOME | End: 2022-03-24

## 2022-03-24 DIAGNOSIS — F41.9 ANXIETY DISORDER, UNSPECIFIED: ICD-10-CM

## 2022-03-24 PROCEDURE — 99214 OFFICE O/P EST MOD 30 MIN: CPT | Mod: 95

## 2022-03-24 NOTE — CDI QUERY NOTE - NSCDIOTHERTXTBX_GEN_ALL_CORE_HH
DOCUMENTATION CLARIFICATION FORM      Encounter #: [id number]                                       Patient’s Name: Ania Mei  Medical Record #: [id number]                             Admit Date: 3-9-2022  CDI Specialist/: Kimmy                             Contact #: [phone]    Dear Dr. Troncoso,                        Date: 3-                  The Physician’s or Provider’s documentation of the patient’s presentation, evaluation and  medical management, as identified below, may support a diagnosis that is not documented in the medical record.  In order to accurately capture all diagnoses to the greatest degree of specificity reflecting the patient’s actual severity of illness, the documentation in this patient’s medical record requires additional clarification.  Please include more specific documentation, either known or suspected, of a corresponding diagnosis associated with the clinical information described below in your Progress Note and/or Discharge Summary.    CLINICAL INDICATORS  •	3-09 Blood Magnesium Level 1.4  •	3-10 Blood Magnesium Level 1.8  •	3-11 Blood Magnesium Level 1.7  •	3-12 Blood Magnesium Level 1.8  •	3-14 Blood Magnesium Level 1.5  •	3-15 Blood Magnesium Level 2.0  •	3-16 Blood Magnesium Level 1.7  •	3-18 Blood Magnesium Level 1.8  Treatment:   •	3-09 Daily serum Magnesium levels  •	3-09 Magnesium Sulfate 2G IVPB x 1 dose  •	3-11 Magnesium Sulfate 2G IVPB x 1 dose  •	3-14 Magnesium Sulfate 2G IVPB x 1 dose  •	3-16 Magnesium Sulfate 2G IVPB x 1 dose    QUERY  Based on your clinical judgment and the above clinical indicators, can a blood Magnesium level above treated with IV Magnesium Sulfate be further specified as:  (  ) Hypomagnesemia treated and resolved  (  ) Other (please specify) __________  (  ) Unable to clinically determine     Documentation clarification is required for compliance, accuracy in coding and billing, and reporting severity of illness, quality data   and risk of mortality.  --------------------------------------------------------------------------------------------------------------------------------------------  DO NOT REMOVE THIS RECORD WITHOUT FIRST NOTIFYING THE CDI SPECIALIST  This form is NOT a part of the permanent Medical Record.

## 2022-03-25 ENCOUNTER — EMERGENCY (EMERGENCY)
Facility: HOSPITAL | Age: 37
LOS: 0 days | Discharge: NURSING, REHAB OR LONG-TERM CARE | End: 2022-03-25
Attending: EMERGENCY MEDICINE | Admitting: EMERGENCY MEDICINE
Payer: MEDICAID

## 2022-03-25 VITALS
SYSTOLIC BLOOD PRESSURE: 142 MMHG | OXYGEN SATURATION: 99 % | RESPIRATION RATE: 18 BRPM | TEMPERATURE: 97 F | HEART RATE: 100 BPM | DIASTOLIC BLOOD PRESSURE: 72 MMHG

## 2022-03-25 VITALS
OXYGEN SATURATION: 95 % | WEIGHT: 154.1 LBS | HEART RATE: 98 BPM | TEMPERATURE: 100 F | SYSTOLIC BLOOD PRESSURE: 129 MMHG | RESPIRATION RATE: 18 BRPM | DIASTOLIC BLOOD PRESSURE: 70 MMHG | HEIGHT: 65 IN

## 2022-03-25 DIAGNOSIS — Z79.82 LONG TERM (CURRENT) USE OF ASPIRIN: ICD-10-CM

## 2022-03-25 DIAGNOSIS — K62.5 HEMORRHAGE OF ANUS AND RECTUM: ICD-10-CM

## 2022-03-25 DIAGNOSIS — K21.9 GASTRO-ESOPHAGEAL REFLUX DISEASE WITHOUT ESOPHAGITIS: ICD-10-CM

## 2022-03-25 DIAGNOSIS — Z88.8 ALLERGY STATUS TO OTHER DRUGS, MEDICAMENTS AND BIOLOGICAL SUBSTANCES STATUS: ICD-10-CM

## 2022-03-25 DIAGNOSIS — Z87.891 PERSONAL HISTORY OF NICOTINE DEPENDENCE: ICD-10-CM

## 2022-03-25 DIAGNOSIS — Z79.4 LONG TERM (CURRENT) USE OF INSULIN: ICD-10-CM

## 2022-03-25 DIAGNOSIS — Z88.1 ALLERGY STATUS TO OTHER ANTIBIOTIC AGENTS STATUS: ICD-10-CM

## 2022-03-25 DIAGNOSIS — Z88.7 ALLERGY STATUS TO SERUM AND VACCINE: ICD-10-CM

## 2022-03-25 DIAGNOSIS — R10.30 LOWER ABDOMINAL PAIN, UNSPECIFIED: ICD-10-CM

## 2022-03-25 DIAGNOSIS — Z88.0 ALLERGY STATUS TO PENICILLIN: ICD-10-CM

## 2022-03-25 DIAGNOSIS — K52.9 NONINFECTIVE GASTROENTERITIS AND COLITIS, UNSPECIFIED: ICD-10-CM

## 2022-03-25 DIAGNOSIS — F32.A DEPRESSION, UNSPECIFIED: ICD-10-CM

## 2022-03-25 LAB
ALBUMIN SERPL ELPH-MCNC: 4.4 G/DL — SIGNIFICANT CHANGE UP (ref 3.5–5.2)
ALP SERPL-CCNC: 99 U/L — SIGNIFICANT CHANGE UP (ref 30–115)
ALT FLD-CCNC: 62 U/L — HIGH (ref 0–41)
ANION GAP SERPL CALC-SCNC: 12 MMOL/L — SIGNIFICANT CHANGE UP (ref 7–14)
APPEARANCE UR: CLEAR — SIGNIFICANT CHANGE UP
APTT BLD: 36 SEC — SIGNIFICANT CHANGE UP (ref 27–39.2)
AST SERPL-CCNC: 30 U/L — SIGNIFICANT CHANGE UP (ref 0–41)
BASOPHILS # BLD AUTO: 0.05 K/UL — SIGNIFICANT CHANGE UP (ref 0–0.2)
BASOPHILS NFR BLD AUTO: 1.1 % — HIGH (ref 0–1)
BILIRUB SERPL-MCNC: <0.2 MG/DL — SIGNIFICANT CHANGE UP (ref 0.2–1.2)
BILIRUB UR-MCNC: NEGATIVE — SIGNIFICANT CHANGE UP
BLD GP AB SCN SERPL QL: SIGNIFICANT CHANGE UP
BUN SERPL-MCNC: 6 MG/DL — LOW (ref 10–20)
CALCIUM SERPL-MCNC: 8.9 MG/DL — SIGNIFICANT CHANGE UP (ref 8.5–10.1)
CHLORIDE SERPL-SCNC: 102 MMOL/L — SIGNIFICANT CHANGE UP (ref 98–110)
CO2 SERPL-SCNC: 24 MMOL/L — SIGNIFICANT CHANGE UP (ref 17–32)
COLOR SPEC: YELLOW — SIGNIFICANT CHANGE UP
CREAT SERPL-MCNC: 0.5 MG/DL — LOW (ref 0.7–1.5)
DIFF PNL FLD: NEGATIVE — SIGNIFICANT CHANGE UP
EGFR: 125 ML/MIN/1.73M2 — SIGNIFICANT CHANGE UP
EOSINOPHIL # BLD AUTO: 0.12 K/UL — SIGNIFICANT CHANGE UP (ref 0–0.7)
EOSINOPHIL NFR BLD AUTO: 2.7 % — SIGNIFICANT CHANGE UP (ref 0–8)
GLUCOSE SERPL-MCNC: 167 MG/DL — HIGH (ref 70–99)
GLUCOSE UR QL: NEGATIVE MG/DL — SIGNIFICANT CHANGE UP
HCG SERPL QL: NEGATIVE — SIGNIFICANT CHANGE UP
HCT VFR BLD CALC: 28.9 % — LOW (ref 37–47)
HGB BLD-MCNC: 8.7 G/DL — LOW (ref 12–16)
IMM GRANULOCYTES NFR BLD AUTO: 0.4 % — HIGH (ref 0.1–0.3)
INR BLD: 1.18 RATIO — SIGNIFICANT CHANGE UP (ref 0.65–1.3)
KETONES UR-MCNC: NEGATIVE — SIGNIFICANT CHANGE UP
LACTATE SERPL-SCNC: 1 MMOL/L — SIGNIFICANT CHANGE UP (ref 0.7–2)
LEUKOCYTE ESTERASE UR-ACNC: NEGATIVE — SIGNIFICANT CHANGE UP
LIDOCAIN IGE QN: 13 U/L — SIGNIFICANT CHANGE UP (ref 7–60)
LYMPHOCYTES # BLD AUTO: 1.35 K/UL — SIGNIFICANT CHANGE UP (ref 1.2–3.4)
LYMPHOCYTES # BLD AUTO: 30.1 % — SIGNIFICANT CHANGE UP (ref 20.5–51.1)
MAGNESIUM SERPL-MCNC: 1.9 MG/DL — SIGNIFICANT CHANGE UP (ref 1.8–2.4)
MCHC RBC-ENTMCNC: 24 PG — LOW (ref 27–31)
MCHC RBC-ENTMCNC: 30.1 G/DL — LOW (ref 32–37)
MCV RBC AUTO: 79.6 FL — LOW (ref 81–99)
MONOCYTES # BLD AUTO: 0.66 K/UL — HIGH (ref 0.1–0.6)
MONOCYTES NFR BLD AUTO: 14.7 % — HIGH (ref 1.7–9.3)
NEUTROPHILS # BLD AUTO: 2.29 K/UL — SIGNIFICANT CHANGE UP (ref 1.4–6.5)
NEUTROPHILS NFR BLD AUTO: 51 % — SIGNIFICANT CHANGE UP (ref 42.2–75.2)
NITRITE UR-MCNC: NEGATIVE — SIGNIFICANT CHANGE UP
NRBC # BLD: 0 /100 WBCS — SIGNIFICANT CHANGE UP (ref 0–0)
PH UR: 6 — SIGNIFICANT CHANGE UP (ref 5–8)
PLATELET # BLD AUTO: 581 K/UL — HIGH (ref 130–400)
POTASSIUM SERPL-MCNC: 4.4 MMOL/L — SIGNIFICANT CHANGE UP (ref 3.5–5)
POTASSIUM SERPL-SCNC: 4.4 MMOL/L — SIGNIFICANT CHANGE UP (ref 3.5–5)
PROT SERPL-MCNC: 6.9 G/DL — SIGNIFICANT CHANGE UP (ref 6–8)
PROT UR-MCNC: NEGATIVE MG/DL — SIGNIFICANT CHANGE UP
PROTHROM AB SERPL-ACNC: 13.5 SEC — HIGH (ref 9.95–12.87)
RBC # BLD: 3.63 M/UL — LOW (ref 4.2–5.4)
RBC # FLD: 16.2 % — HIGH (ref 11.5–14.5)
SODIUM SERPL-SCNC: 138 MMOL/L — SIGNIFICANT CHANGE UP (ref 135–146)
SP GR SPEC: 1.02 — SIGNIFICANT CHANGE UP (ref 1.01–1.03)
UROBILINOGEN FLD QL: 0.2 MG/DL — SIGNIFICANT CHANGE UP
WBC # BLD: 4.49 K/UL — LOW (ref 4.8–10.8)
WBC # FLD AUTO: 4.49 K/UL — LOW (ref 4.8–10.8)

## 2022-03-25 PROCEDURE — 99285 EMERGENCY DEPT VISIT HI MDM: CPT

## 2022-03-25 PROCEDURE — 74177 CT ABD & PELVIS W/CONTRAST: CPT | Mod: 26,MA

## 2022-03-25 RX ORDER — SODIUM CHLORIDE 9 MG/ML
1000 INJECTION, SOLUTION INTRAVENOUS ONCE
Refills: 0 | Status: COMPLETED | OUTPATIENT
Start: 2022-03-25 | End: 2022-03-25

## 2022-03-25 RX ORDER — MORPHINE SULFATE 50 MG/1
4 CAPSULE, EXTENDED RELEASE ORAL ONCE
Refills: 0 | Status: DISCONTINUED | OUTPATIENT
Start: 2022-03-25 | End: 2022-03-25

## 2022-03-25 RX ADMIN — MORPHINE SULFATE 4 MILLIGRAM(S): 50 CAPSULE, EXTENDED RELEASE ORAL at 18:55

## 2022-03-25 RX ADMIN — SODIUM CHLORIDE 1000 MILLILITER(S): 9 INJECTION, SOLUTION INTRAVENOUS at 17:36

## 2022-03-25 NOTE — ED PROVIDER NOTE - CLINICAL SUMMARY MEDICAL DECISION MAKING FREE TEXT BOX
36y female above PMH well known to ED recently admitted for colitis cdif neg GI PCR+ noro on bentyl with brbpr, has h/o same, referred by GI's office for eval as unable to see today, no n/v, +crampy lower abd pain, PE as above, labs and studies reviewed and d/w patient, will d/c to continue current regimen. Patient counseled regarding conditions which should prompt return.

## 2022-03-25 NOTE — ED ADULT NURSE NOTE - NS ED NURSE AMBULANCES2
Detail Level: Zone Plan: Courtesy cryotherapy right scalp Initiate Treatment: [facility].

## 2022-03-25 NOTE — ED ADULT TRIAGE NOTE - AS HEIGHT TYPE
Dr Karsten Bloch aware of bp. Pt denies pain. Pt given direct admit by car directions to him and his mother. Both verbalized understanding. stated

## 2022-03-25 NOTE — ED PROVIDER NOTE - PATIENT PORTAL LINK FT
You can access the FollowMyHealth Patient Portal offered by [facility] by registering at the following website: [email or web address]. By joining [employer]’s FollowMyHealth portal, you will also be able to view your health information using other applications (apps) compatible with our system.

## 2022-03-25 NOTE — ED PROVIDER NOTE - OBJECTIVE STATEMENT
Patient from [facility] c/o BRBPR with stool,lower abdominal pain,  past few days, 2-3 / day. no fever, no N/V, H/o colitis, Unable to see PMD, Told to come to ED for eval.

## 2022-03-25 NOTE — ED PROVIDER NOTE - CARE PROVIDER_API CALL
Leonardo Mejia)  Gastroenterology; Internal Medicine  [address] [place] [address]  Phone: [phone]  Fax: [phone]  Follow Up Time: 4-6 Days

## 2022-03-27 LAB
CULTURE RESULTS: SIGNIFICANT CHANGE UP
SPECIMEN SOURCE: SIGNIFICANT CHANGE UP

## 2022-03-31 ENCOUNTER — APPOINTMENT (OUTPATIENT)
Dept: PSYCHIATRY | Facility: CLINIC | Age: 37
End: 2022-03-31
Payer: MEDICAID

## 2022-03-31 ENCOUNTER — OUTPATIENT (OUTPATIENT)
Dept: OUTPATIENT SERVICES | Facility: HOSPITAL | Age: 37
LOS: 1 days | Discharge: HOME | End: 2022-03-31

## 2022-03-31 DIAGNOSIS — F41.9 ANXIETY DISORDER, UNSPECIFIED: ICD-10-CM

## 2022-03-31 PROCEDURE — 99213 OFFICE O/P EST LOW 20 MIN: CPT | Mod: 95

## 2022-04-06 ENCOUNTER — OUTPATIENT (OUTPATIENT)
Dept: OUTPATIENT SERVICES | Facility: HOSPITAL | Age: 37
LOS: 1 days | Discharge: HOME | End: 2022-04-06

## 2022-04-06 ENCOUNTER — APPOINTMENT (OUTPATIENT)
Dept: PSYCHIATRY | Facility: CLINIC | Age: 37
End: 2022-04-06
Payer: MEDICAID

## 2022-04-06 DIAGNOSIS — F41.9 ANXIETY DISORDER, UNSPECIFIED: ICD-10-CM

## 2022-04-06 PROCEDURE — 99214 OFFICE O/P EST MOD 30 MIN: CPT

## 2022-04-11 ENCOUNTER — OUTPATIENT (OUTPATIENT)
Dept: OUTPATIENT SERVICES | Facility: HOSPITAL | Age: 37
LOS: 1 days | Discharge: HOME | End: 2022-04-11

## 2022-04-11 ENCOUNTER — APPOINTMENT (OUTPATIENT)
Dept: PODIATRY | Facility: CLINIC | Age: 37
End: 2022-04-11
Payer: MEDICAID

## 2022-04-11 VITALS — HEIGHT: 64 IN | BODY MASS INDEX: 28.51 KG/M2 | WEIGHT: 167 LBS

## 2022-04-11 PROCEDURE — 99213 OFFICE O/P EST LOW 20 MIN: CPT

## 2022-04-11 NOTE — HISTORY OF PRESENT ILLNESS
[FreeTextEntry1] : CC: "I am here to check my routine foot care"\par HPI:\par Mrs. Mei is a 36 yr old female pt who presents for diabetic foot check up; eval BL feet;

## 2022-04-11 NOTE — PHYSICAL EXAM
[General Appearance - Alert] : alert [General Appearance - In No Acute Distress] : in no acute distress [General Appearance - Well Nourished] : well nourished [General Appearance - Well Developed] : well developed [de-identified] : No abnormality was found [FreeTextEntry1] : Mild xerosis noted on BL feet;  [Sensation] : the sensory exam was normal to light touch and pinprick [No Focal Deficits] : no focal deficits

## 2022-04-11 NOTE — ASSESSMENT
[FreeTextEntry1] : Assessment: xerosis cutis\par Plan:\par Pt seen and eval\par Pt already has ammonium lactate at home; educated to keep using it on xerosis area; \par Educated pt regarding importance of sugar control; \par F/u in 6 months for foot eval

## 2022-04-14 ENCOUNTER — APPOINTMENT (OUTPATIENT)
Dept: PSYCHIATRY | Facility: CLINIC | Age: 37
End: 2022-04-14

## 2022-04-18 DIAGNOSIS — R23.4 CHANGES IN SKIN TEXTURE: ICD-10-CM

## 2022-04-18 DIAGNOSIS — R26.2 DIFFICULTY IN WALKING, NOT ELSEWHERE CLASSIFIED: ICD-10-CM

## 2022-04-18 DIAGNOSIS — L85.3 XEROSIS CUTIS: ICD-10-CM

## 2022-04-18 DIAGNOSIS — E11.621 TYPE 2 DIABETES MELLITUS WITH FOOT ULCER: ICD-10-CM

## 2022-04-18 DIAGNOSIS — E11.42 TYPE 2 DIABETES MELLITUS WITH DIABETIC POLYNEUROPATHY: ICD-10-CM

## 2022-04-25 ENCOUNTER — EMERGENCY (EMERGENCY)
Facility: HOSPITAL | Age: 37
LOS: 0 days | Discharge: HOME | End: 2022-04-25
Attending: EMERGENCY MEDICINE | Admitting: EMERGENCY MEDICINE
Payer: MEDICAID

## 2022-04-25 VITALS
WEIGHT: 160.06 LBS | OXYGEN SATURATION: 100 % | HEIGHT: 65 IN | SYSTOLIC BLOOD PRESSURE: 137 MMHG | TEMPERATURE: 98 F | HEART RATE: 107 BPM | RESPIRATION RATE: 18 BRPM | DIASTOLIC BLOOD PRESSURE: 77 MMHG

## 2022-04-25 VITALS
OXYGEN SATURATION: 97 % | HEART RATE: 101 BPM | TEMPERATURE: 99 F | DIASTOLIC BLOOD PRESSURE: 60 MMHG | SYSTOLIC BLOOD PRESSURE: 120 MMHG | RESPIRATION RATE: 18 BRPM

## 2022-04-25 DIAGNOSIS — Z79.4 LONG TERM (CURRENT) USE OF INSULIN: ICD-10-CM

## 2022-04-25 DIAGNOSIS — D72.829 ELEVATED WHITE BLOOD CELL COUNT, UNSPECIFIED: ICD-10-CM

## 2022-04-25 DIAGNOSIS — R10.13 EPIGASTRIC PAIN: ICD-10-CM

## 2022-04-25 DIAGNOSIS — Z88.7 ALLERGY STATUS TO SERUM AND VACCINE: ICD-10-CM

## 2022-04-25 DIAGNOSIS — Z79.82 LONG TERM (CURRENT) USE OF ASPIRIN: ICD-10-CM

## 2022-04-25 DIAGNOSIS — Z87.19 PERSONAL HISTORY OF OTHER DISEASES OF THE DIGESTIVE SYSTEM: ICD-10-CM

## 2022-04-25 DIAGNOSIS — K21.9 GASTRO-ESOPHAGEAL REFLUX DISEASE WITHOUT ESOPHAGITIS: ICD-10-CM

## 2022-04-25 DIAGNOSIS — E10.40 TYPE 1 DIABETES MELLITUS WITH DIABETIC NEUROPATHY, UNSPECIFIED: ICD-10-CM

## 2022-04-25 DIAGNOSIS — Z88.8 ALLERGY STATUS TO OTHER DRUGS, MEDICAMENTS AND BIOLOGICAL SUBSTANCES STATUS: ICD-10-CM

## 2022-04-25 DIAGNOSIS — Z88.0 ALLERGY STATUS TO PENICILLIN: ICD-10-CM

## 2022-04-25 DIAGNOSIS — Z88.1 ALLERGY STATUS TO OTHER ANTIBIOTIC AGENTS STATUS: ICD-10-CM

## 2022-04-25 DIAGNOSIS — F32.9 MAJOR DEPRESSIVE DISORDER, SINGLE EPISODE, UNSPECIFIED: ICD-10-CM

## 2022-04-25 LAB
ALBUMIN SERPL ELPH-MCNC: 4.7 G/DL — SIGNIFICANT CHANGE UP (ref 3.5–5.2)
ALP SERPL-CCNC: 105 U/L — SIGNIFICANT CHANGE UP (ref 30–115)
ALT FLD-CCNC: 27 U/L — SIGNIFICANT CHANGE UP (ref 0–41)
ANION GAP SERPL CALC-SCNC: 14 MMOL/L — SIGNIFICANT CHANGE UP (ref 7–14)
APPEARANCE UR: CLEAR — SIGNIFICANT CHANGE UP
AST SERPL-CCNC: 25 U/L — SIGNIFICANT CHANGE UP (ref 0–41)
BASOPHILS # BLD AUTO: 0.04 K/UL — SIGNIFICANT CHANGE UP (ref 0–0.2)
BASOPHILS NFR BLD AUTO: 0.2 % — SIGNIFICANT CHANGE UP (ref 0–1)
BILIRUB SERPL-MCNC: 0.3 MG/DL — SIGNIFICANT CHANGE UP (ref 0.2–1.2)
BILIRUB UR-MCNC: NEGATIVE — SIGNIFICANT CHANGE UP
BUN SERPL-MCNC: 11 MG/DL — SIGNIFICANT CHANGE UP (ref 10–20)
CALCIUM SERPL-MCNC: 9.6 MG/DL — SIGNIFICANT CHANGE UP (ref 8.5–10.1)
CHLORIDE SERPL-SCNC: 102 MMOL/L — SIGNIFICANT CHANGE UP (ref 98–110)
CO2 SERPL-SCNC: 24 MMOL/L — SIGNIFICANT CHANGE UP (ref 17–32)
COLOR SPEC: SIGNIFICANT CHANGE UP
CREAT SERPL-MCNC: 0.6 MG/DL — LOW (ref 0.7–1.5)
DIFF PNL FLD: NEGATIVE — SIGNIFICANT CHANGE UP
EGFR: 119 ML/MIN/1.73M2 — SIGNIFICANT CHANGE UP
EOSINOPHIL # BLD AUTO: 0 K/UL — SIGNIFICANT CHANGE UP (ref 0–0.7)
EOSINOPHIL NFR BLD AUTO: 0 % — SIGNIFICANT CHANGE UP (ref 0–8)
GLUCOSE SERPL-MCNC: 151 MG/DL — HIGH (ref 70–99)
GLUCOSE UR QL: NEGATIVE — SIGNIFICANT CHANGE UP
HCG SERPL QL: NEGATIVE — SIGNIFICANT CHANGE UP
HCT VFR BLD CALC: 30.2 % — LOW (ref 37–47)
HGB BLD-MCNC: 9.1 G/DL — LOW (ref 12–16)
IMM GRANULOCYTES NFR BLD AUTO: 0.6 % — HIGH (ref 0.1–0.3)
KETONES UR-MCNC: NEGATIVE — SIGNIFICANT CHANGE UP
LACTATE SERPL-SCNC: 1.2 MMOL/L — SIGNIFICANT CHANGE UP (ref 0.7–2)
LEUKOCYTE ESTERASE UR-ACNC: NEGATIVE — SIGNIFICANT CHANGE UP
LIDOCAIN IGE QN: 9 U/L — SIGNIFICANT CHANGE UP (ref 7–60)
LYMPHOCYTES # BLD AUTO: 0.55 K/UL — LOW (ref 1.2–3.4)
LYMPHOCYTES # BLD AUTO: 3.2 % — LOW (ref 20.5–51.1)
MCHC RBC-ENTMCNC: 22.5 PG — LOW (ref 27–31)
MCHC RBC-ENTMCNC: 30.1 G/DL — LOW (ref 32–37)
MCV RBC AUTO: 74.8 FL — LOW (ref 81–99)
MONOCYTES # BLD AUTO: 0.61 K/UL — HIGH (ref 0.1–0.6)
MONOCYTES NFR BLD AUTO: 3.5 % — SIGNIFICANT CHANGE UP (ref 1.7–9.3)
NEUTROPHILS # BLD AUTO: 16.07 K/UL — HIGH (ref 1.4–6.5)
NEUTROPHILS NFR BLD AUTO: 92.5 % — HIGH (ref 42.2–75.2)
NITRITE UR-MCNC: NEGATIVE — SIGNIFICANT CHANGE UP
NRBC # BLD: 0 /100 WBCS — SIGNIFICANT CHANGE UP (ref 0–0)
PH UR: 8.5 — HIGH (ref 5–8)
PLATELET # BLD AUTO: 569 K/UL — HIGH (ref 130–400)
POTASSIUM SERPL-MCNC: 4.9 MMOL/L — SIGNIFICANT CHANGE UP (ref 3.5–5)
POTASSIUM SERPL-SCNC: 4.9 MMOL/L — SIGNIFICANT CHANGE UP (ref 3.5–5)
PROT SERPL-MCNC: 7.5 G/DL — SIGNIFICANT CHANGE UP (ref 6–8)
PROT UR-MCNC: SIGNIFICANT CHANGE UP
RBC # BLD: 4.04 M/UL — LOW (ref 4.2–5.4)
RBC # FLD: 16.9 % — HIGH (ref 11.5–14.5)
SODIUM SERPL-SCNC: 140 MMOL/L — SIGNIFICANT CHANGE UP (ref 135–146)
SP GR SPEC: >1.05 (ref 1.01–1.03)
UROBILINOGEN FLD QL: SIGNIFICANT CHANGE UP
WBC # BLD: 17.37 K/UL — HIGH (ref 4.8–10.8)
WBC # FLD AUTO: 17.37 K/UL — HIGH (ref 4.8–10.8)

## 2022-04-25 PROCEDURE — 74177 CT ABD & PELVIS W/CONTRAST: CPT | Mod: 26,MA

## 2022-04-25 PROCEDURE — 71045 X-RAY EXAM CHEST 1 VIEW: CPT | Mod: 26

## 2022-04-25 PROCEDURE — 99285 EMERGENCY DEPT VISIT HI MDM: CPT

## 2022-04-25 PROCEDURE — 93010 ELECTROCARDIOGRAM REPORT: CPT

## 2022-04-25 RX ORDER — SODIUM CHLORIDE 9 MG/ML
1000 INJECTION INTRAMUSCULAR; INTRAVENOUS; SUBCUTANEOUS ONCE
Refills: 0 | Status: COMPLETED | OUTPATIENT
Start: 2022-04-25 | End: 2022-04-25

## 2022-04-25 RX ORDER — FAMOTIDINE 10 MG/ML
20 INJECTION INTRAVENOUS ONCE
Refills: 0 | Status: COMPLETED | OUTPATIENT
Start: 2022-04-25 | End: 2022-04-25

## 2022-04-25 RX ADMIN — SODIUM CHLORIDE 1000 MILLILITER(S): 9 INJECTION INTRAMUSCULAR; INTRAVENOUS; SUBCUTANEOUS at 15:10

## 2022-04-25 RX ADMIN — Medication 30 MILLILITER(S): at 10:53

## 2022-04-25 RX ADMIN — Medication 20 MILLIGRAM(S): at 10:58

## 2022-04-25 RX ADMIN — SODIUM CHLORIDE 1000 MILLILITER(S): 9 INJECTION INTRAMUSCULAR; INTRAVENOUS; SUBCUTANEOUS at 10:53

## 2022-04-25 RX ADMIN — FAMOTIDINE 20 MILLIGRAM(S): 10 INJECTION INTRAVENOUS at 10:53

## 2022-04-25 NOTE — ED PROVIDER NOTE - PHYSICAL EXAMINATION
GENERAL: Well-nourished, Well-developed. NAD.  HEAD: No visible or palpable bumps or hematomas. No ecchymosis behind ears B/L.  Eyes: PERRLA, EOMI. No asymmetry. No nystagmus. No conjunctival injection. Non-icteric sclera.  ENMT: MMM.   Neck: Supple. FROM  CVS: RRR. Normal S1,S2. No murmurs appreciated on auscultation   RESP: No use of accessory muscles. Chest rise symmetrical with good expansion. Lungs clear to auscultation B/L. No wheezing, rales, or rhonchi auscultated.  GI: Normal auscultation of bowel sounds in all 4 quadrants. Soft, Nontender, Nondistended. No guarding or rebound tenderness. No CVAT B/L.  Skin: Warm, Dry. No rashes or lesions. Good cap refill < 2 sec B/L.  EXT: Radial and pedal pulses present B/L. No calf tenderness or swelling B/L. No palpable cords. No pedal edema B/L.

## 2022-04-25 NOTE — ED PROVIDER NOTE - OBJECTIVE STATEMENT
37 yo F pmhx GERD, colitis, DM presenting to the ED for evaluation of epigastric abdominal pain x 1 week. Pt reports persistent crampy/spasms to upper abdomen, denies any modifying factors. Denies any fever, chills, nausea, vomiting, dysuria, hematuria, chest pain, sob.

## 2022-04-25 NOTE — ED PROVIDER NOTE - CLINICAL SUMMARY MEDICAL DECISION MAKING FREE TEXT BOX
Patient presented with abdominal pain x 1 week. (+) tender on exam but otherwise afebrile, HD stable, well appearing. Obtained labs which showed non-specific leukocytosis but otherwise were grossly unremarkable including no significant anemia, signs of dehydration/CELINA, transaminitis or significant electrolyte abnormalities. UA negative for infection. CT abd/pelvis negative for emergent processes. Patient felt much better after tx in ED, and serial abdominal exams benign. Able to tolerate PO. Given the above, will discharge home with outpatient follow up. Patient agreeable with plan. Agrees to return to ED for any new or worsening symptoms.

## 2022-04-25 NOTE — ED PROVIDER NOTE - NSFOLLOWUPINSTRUCTIONS_ED_ALL_ED_FT
**Follow up with your GI doctor and primary care doctor within 1-3 days***  Acute Abdominal Pain    WHAT YOU NEED TO KNOW:    The cause of your abdominal pain may not be found. If a cause is found, treatment will depend on what the cause is.     DISCHARGE INSTRUCTIONS:    Return to the emergency department if:     You vomit blood or cannot stop vomiting.      You have blood in your bowel movement or it looks like tar.       You have bleeding from your rectum.       Your abdomen is larger than usual, more painful, and hard.       You have severe pain in your abdomen.       You stop passing gas and having bowel movements.       You feel weak, dizzy, or faint.    Contact your healthcare provider if:     You have a fever.      You have new signs and symptoms.      Your symptoms do not get better with treatment.       You have questions or concerns about your condition or care.    Medicines may be given to decrease pain, treat an infection, and manage your symptoms. Take your medicine as directed. Call your healthcare provider if you think your medicine is not helping or if you have side effects. Tell him if you are allergic to any medicine. Keep a list of the medicines, vitamins, and herbs you take. Include the amounts, and when and why you take them. Bring the list or the pill bottles to follow-up visits. Carry your medicine list with you in case of an emergency.    Manage your symptoms:     Apply heat on your abdomen for 20 to 30 minutes every 2 hours for as many days as directed. Heat helps decrease pain and muscle spasms.       Manage your stress. Stress may cause abdominal pain. Your healthcare provider may recommend relaxation techniques and deep breathing exercises to help decrease your stress. Your healthcare provider may recommend you talk to someone about your stress or anxiety, such as a counselor or a trusted friend. Get plenty of sleep and exercise regularly.       Limit or do not drink alcohol. Alcohol can make your abdominal pain worse. Ask your healthcare provider if it is safe for you to drink alcohol. Also ask how much is safe for you to drink.       Do not smoke. Nicotine and other chemicals in cigarettes can damage your esophagus and stomach. Ask your healthcare provider for information if you currently smoke and need help to quit. E-cigarettes or smokeless tobacco still contain nicotine. Talk to your healthcare provider before you use these products.     Make changes to the food you eat as directed: Do not eat foods that cause abdominal pain or other symptoms. Eat small meals more often.     Eat more high-fiber foods if you are constipated. High-fiber foods include fruits, vegetables, whole-grain foods, and legumes.       Do not eat foods that cause gas if you have bloating. Examples include broccoli, cabbage, and cauliflower. Do not drink soda or carbonated drinks, because these may also cause gas.       Do not eat foods or drinks that contain sorbitol or fructose if you have diarrhea and bloating. Some examples are fruit juices, candy, jelly, and sugar-free gum.       Do not eat high-fat foods, such as fried foods, cheeseburgers, hot dogs, and desserts.      Limit or do not drink caffeine. Caffeine may make symptoms, such as heart burn or nausea, worse.       Drink plenty of liquids to prevent dehydration from diarrhea or vomiting. Ask your healthcare provider how much liquid to drink each day and which liquids are best for you.     Follow up with your healthcare provider as directed: Write down your questions so you remember to ask them during your visits.       © Copyright [employer] 2019 All illustrations and images included in CareNotes are the copyrighted property of [employer]., Inc. or [employer]

## 2022-04-25 NOTE — ED PROVIDER NOTE - PATIENT PORTAL LINK FT
You can access the FollowMyHealth Patient Portal offered by [facility] by registering at the following website: [email or web address]. By joining [employer]’s FollowMyHealth portal, you will also be able to view your health information using other applications (apps) compatible with our system.

## 2022-04-25 NOTE — ED PROVIDER NOTE - NS ED ROS FT
Constitutional: (-) fever (-) malaise (-) diaphoresis (-) chills   Eyes: (-) visual changes (-) eye pain (-) eye discharge (-) photophobia (-) FB sensation  Cardiac: (-) chest pain  (-) palpitations (-) syncope (-) edema  Respiratory: (-) cough (-) SOB (-) ALLAN  GI: (-) nausea (-) vomiting (-) diarrhea (+) abdominal pain  : (-) dysuria (-) increased frequency  (-) hematuria (-) incontinence  MS: (-) back pain (-) myalgia (-) muscle weakness (-)  joint pain  Neuro: (-) headache (-) dizziness (-) numbness/tingling to extremities B/L (-) weakness   Skin: (-) rash (-) laceration  GYN: (-) pelvic pain (-) abnormal bleeding (-) abnormal discharge or odor  Except as documented in the HPI, all other systems are negative.

## 2022-04-25 NOTE — ED PROVIDER NOTE - ATTENDING APP SHARED VISIT CONTRIBUTION OF CARE
36-year-old female with a past medical history significant for diabetes colitis presents with abdominal pain.  Patient states that she has been having abdominal pain for the last week.  Patient denies any nausea vomiting fever chills diarrhea or any medical complaints.     VITAL SIGNS: I have reviewed nursing notes and confirm.  CONSTITUTIONAL: non-toxic, well appearing  SKIN: no rash, no petechiae.  EYES: EOMI, pink conjunctiva, anicteric  ENT: tongue midline, no exudates, MMM  NECK: Supple; no meningismus, no JVD  CARD: RRR, no murmurs, equal radial pulses bilaterally 2+  RESP: CTAB, no respiratory distress  ABD: Soft, non-tender, non-distended, no peritoneal signs, no HSM, no CVA tenderness  EXT: Normal ROM x4. No edema. No calves tenderness  NEURO: Alert, oriented x3. CN2-12 intact, equal strength bilaterally, nl gait.  PSYCH: Cooperative, appropriate.     a/p  36 yr old f that presents with abd pain   -labs  -imaging   -ekg  -pain management  -reassess  -dispo pending

## 2022-04-26 LAB
CULTURE RESULTS: SIGNIFICANT CHANGE UP
SPECIMEN SOURCE: SIGNIFICANT CHANGE UP

## 2022-04-29 ENCOUNTER — OUTPATIENT (OUTPATIENT)
Dept: OUTPATIENT SERVICES | Facility: HOSPITAL | Age: 37
LOS: 1 days | Discharge: HOME | End: 2022-04-29
Payer: MEDICAID

## 2022-04-29 ENCOUNTER — APPOINTMENT (OUTPATIENT)
Dept: PSYCHIATRY | Facility: CLINIC | Age: 37
End: 2022-04-29

## 2022-04-29 ENCOUNTER — NON-APPOINTMENT (OUTPATIENT)
Age: 37
End: 2022-04-29

## 2022-04-29 DIAGNOSIS — F41.9 ANXIETY DISORDER, UNSPECIFIED: ICD-10-CM

## 2022-04-29 PROCEDURE — 99214 OFFICE O/P EST MOD 30 MIN: CPT

## 2022-05-10 ENCOUNTER — APPOINTMENT (OUTPATIENT)
Dept: OBGYN | Facility: CLINIC | Age: 37
End: 2022-05-10
Payer: MEDICAID

## 2022-05-10 VITALS — BODY MASS INDEX: 26.61 KG/M2 | DIASTOLIC BLOOD PRESSURE: 80 MMHG | SYSTOLIC BLOOD PRESSURE: 124 MMHG | WEIGHT: 155 LBS

## 2022-05-10 PROCEDURE — 99395 PREV VISIT EST AGE 18-39: CPT

## 2022-05-10 NOTE — HISTORY OF PRESENT ILLNESS
[FreeTextEntry1] : Patient is 37 years old para 0-0-0-0 last menstrual period April 18, 2022.\par She has a history of irregular cycles and PCOS.  Patient notes menstrual cycles approximately monthly.\par She denies pain or heavy bleeding.

## 2022-05-10 NOTE — DISCUSSION/SUMMARY
[FreeTextEntry1] : Pap done\par Self breast exam stressed\par Prescribed bilateral screening mammogram and bilateral breast ultrasound\par Keep menstrual calendar\par Follow-up yearly or as needed

## 2022-05-13 ENCOUNTER — OUTPATIENT (OUTPATIENT)
Dept: OUTPATIENT SERVICES | Facility: HOSPITAL | Age: 37
LOS: 1 days | Discharge: HOME | End: 2022-05-13

## 2022-05-13 ENCOUNTER — EMERGENCY (EMERGENCY)
Facility: HOSPITAL | Age: 37
LOS: 0 days | Discharge: NURSING, REHAB OR LONG-TERM CARE | End: 2022-05-14
Attending: EMERGENCY MEDICINE | Admitting: EMERGENCY MEDICINE
Payer: MEDICAID

## 2022-05-13 ENCOUNTER — APPOINTMENT (OUTPATIENT)
Dept: PSYCHIATRY | Facility: CLINIC | Age: 37
End: 2022-05-13

## 2022-05-13 VITALS
TEMPERATURE: 99 F | OXYGEN SATURATION: 99 % | SYSTOLIC BLOOD PRESSURE: 132 MMHG | DIASTOLIC BLOOD PRESSURE: 72 MMHG | RESPIRATION RATE: 18 BRPM | HEART RATE: 109 BPM

## 2022-05-13 VITALS
HEART RATE: 115 BPM | RESPIRATION RATE: 18 BRPM | DIASTOLIC BLOOD PRESSURE: 57 MMHG | SYSTOLIC BLOOD PRESSURE: 137 MMHG | WEIGHT: 166.89 LBS | OXYGEN SATURATION: 99 % | TEMPERATURE: 98 F | HEIGHT: 65 IN

## 2022-05-13 DIAGNOSIS — G89.29 OTHER CHRONIC PAIN: ICD-10-CM

## 2022-05-13 DIAGNOSIS — R10.13 EPIGASTRIC PAIN: ICD-10-CM

## 2022-05-13 DIAGNOSIS — Z88.1 ALLERGY STATUS TO OTHER ANTIBIOTIC AGENTS STATUS: ICD-10-CM

## 2022-05-13 DIAGNOSIS — Z88.0 ALLERGY STATUS TO PENICILLIN: ICD-10-CM

## 2022-05-13 DIAGNOSIS — Z79.4 LONG TERM (CURRENT) USE OF INSULIN: ICD-10-CM

## 2022-05-13 DIAGNOSIS — R07.89 OTHER CHEST PAIN: ICD-10-CM

## 2022-05-13 DIAGNOSIS — Z88.7 ALLERGY STATUS TO SERUM AND VACCINE: ICD-10-CM

## 2022-05-13 DIAGNOSIS — Z96.41 PRESENCE OF INSULIN PUMP (EXTERNAL) (INTERNAL): ICD-10-CM

## 2022-05-13 DIAGNOSIS — Z79.82 LONG TERM (CURRENT) USE OF ASPIRIN: ICD-10-CM

## 2022-05-13 DIAGNOSIS — Z88.8 ALLERGY STATUS TO OTHER DRUGS, MEDICAMENTS AND BIOLOGICAL SUBSTANCES STATUS: ICD-10-CM

## 2022-05-13 DIAGNOSIS — Z91.51 PERSONAL HISTORY OF SUICIDAL BEHAVIOR: ICD-10-CM

## 2022-05-13 DIAGNOSIS — E11.9 TYPE 2 DIABETES MELLITUS WITHOUT COMPLICATIONS: ICD-10-CM

## 2022-05-13 DIAGNOSIS — I10 ESSENTIAL (PRIMARY) HYPERTENSION: ICD-10-CM

## 2022-05-13 DIAGNOSIS — F41.1 GENERALIZED ANXIETY DISORDER: ICD-10-CM

## 2022-05-13 DIAGNOSIS — Z20.822 CONTACT WITH AND (SUSPECTED) EXPOSURE TO COVID-19: ICD-10-CM

## 2022-05-13 LAB
ALBUMIN SERPL ELPH-MCNC: 4.4 G/DL — SIGNIFICANT CHANGE UP (ref 3.5–5.2)
ALP SERPL-CCNC: 113 U/L — SIGNIFICANT CHANGE UP (ref 30–115)
ALT FLD-CCNC: 21 U/L — SIGNIFICANT CHANGE UP (ref 0–41)
ANION GAP SERPL CALC-SCNC: 14 MMOL/L — SIGNIFICANT CHANGE UP (ref 7–14)
APAP SERPL-MCNC: <5 UG/ML — LOW (ref 10–30)
AST SERPL-CCNC: 17 U/L — SIGNIFICANT CHANGE UP (ref 0–41)
BASOPHILS # BLD AUTO: 0.05 K/UL — SIGNIFICANT CHANGE UP (ref 0–0.2)
BASOPHILS NFR BLD AUTO: 0.6 % — SIGNIFICANT CHANGE UP (ref 0–1)
BILIRUB SERPL-MCNC: <0.2 MG/DL — SIGNIFICANT CHANGE UP (ref 0.2–1.2)
BUN SERPL-MCNC: 10 MG/DL — SIGNIFICANT CHANGE UP (ref 10–20)
CALCIUM SERPL-MCNC: 9.3 MG/DL — SIGNIFICANT CHANGE UP (ref 8.5–10.1)
CHLORIDE SERPL-SCNC: 102 MMOL/L — SIGNIFICANT CHANGE UP (ref 98–110)
CO2 SERPL-SCNC: 21 MMOL/L — SIGNIFICANT CHANGE UP (ref 17–32)
CREAT SERPL-MCNC: 0.6 MG/DL — LOW (ref 0.7–1.5)
EGFR: 118 ML/MIN/1.73M2 — SIGNIFICANT CHANGE UP
EOSINOPHIL # BLD AUTO: 0.1 K/UL — SIGNIFICANT CHANGE UP (ref 0–0.7)
EOSINOPHIL NFR BLD AUTO: 1.2 % — SIGNIFICANT CHANGE UP (ref 0–8)
ETHANOL SERPL-MCNC: <10 MG/DL — SIGNIFICANT CHANGE UP
GLUCOSE SERPL-MCNC: 307 MG/DL — HIGH (ref 70–99)
HCG SERPL QL: NEGATIVE — SIGNIFICANT CHANGE UP
HCT VFR BLD CALC: 28.3 % — LOW (ref 37–47)
HGB BLD-MCNC: 8.2 G/DL — LOW (ref 12–16)
IMM GRANULOCYTES NFR BLD AUTO: 0.2 % — SIGNIFICANT CHANGE UP (ref 0.1–0.3)
LIDOCAIN IGE QN: 16 U/L — SIGNIFICANT CHANGE UP (ref 7–60)
LYMPHOCYTES # BLD AUTO: 2.43 K/UL — SIGNIFICANT CHANGE UP (ref 1.2–3.4)
LYMPHOCYTES # BLD AUTO: 28.9 % — SIGNIFICANT CHANGE UP (ref 20.5–51.1)
MCHC RBC-ENTMCNC: 21.4 PG — LOW (ref 27–31)
MCHC RBC-ENTMCNC: 29 G/DL — LOW (ref 32–37)
MCV RBC AUTO: 73.7 FL — LOW (ref 81–99)
MONOCYTES # BLD AUTO: 0.69 K/UL — HIGH (ref 0.1–0.6)
MONOCYTES NFR BLD AUTO: 8.2 % — SIGNIFICANT CHANGE UP (ref 1.7–9.3)
NEUTROPHILS # BLD AUTO: 5.12 K/UL — SIGNIFICANT CHANGE UP (ref 1.4–6.5)
NEUTROPHILS NFR BLD AUTO: 60.9 % — SIGNIFICANT CHANGE UP (ref 42.2–75.2)
NRBC # BLD: 0 /100 WBCS — SIGNIFICANT CHANGE UP (ref 0–0)
PLATELET # BLD AUTO: 449 K/UL — HIGH (ref 130–400)
POTASSIUM SERPL-MCNC: 4.3 MMOL/L — SIGNIFICANT CHANGE UP (ref 3.5–5)
POTASSIUM SERPL-SCNC: 4.3 MMOL/L — SIGNIFICANT CHANGE UP (ref 3.5–5)
PROT SERPL-MCNC: 7 G/DL — SIGNIFICANT CHANGE UP (ref 6–8)
RBC # BLD: 3.84 M/UL — LOW (ref 4.2–5.4)
RBC # FLD: 17.6 % — HIGH (ref 11.5–14.5)
SALICYLATES SERPL-MCNC: <0.3 MG/DL — LOW (ref 4–30)
SARS-COV-2 RNA SPEC QL NAA+PROBE: SIGNIFICANT CHANGE UP
SODIUM SERPL-SCNC: 137 MMOL/L — SIGNIFICANT CHANGE UP (ref 135–146)
TROPONIN T SERPL-MCNC: <0.01 NG/ML — SIGNIFICANT CHANGE UP
WBC # BLD: 8.41 K/UL — SIGNIFICANT CHANGE UP (ref 4.8–10.8)
WBC # FLD AUTO: 8.41 K/UL — SIGNIFICANT CHANGE UP (ref 4.8–10.8)

## 2022-05-13 PROCEDURE — XXXXX: CPT | Mod: 1L

## 2022-05-13 PROCEDURE — 93010 ELECTROCARDIOGRAM REPORT: CPT

## 2022-05-13 PROCEDURE — 99285 EMERGENCY DEPT VISIT HI MDM: CPT

## 2022-05-13 PROCEDURE — 71045 X-RAY EXAM CHEST 1 VIEW: CPT | Mod: 26

## 2022-05-13 RX ORDER — METOCLOPRAMIDE HCL 10 MG
10 TABLET ORAL ONCE
Refills: 0 | Status: COMPLETED | OUTPATIENT
Start: 2022-05-13 | End: 2022-05-14

## 2022-05-13 RX ORDER — SODIUM CHLORIDE 9 MG/ML
1000 INJECTION, SOLUTION INTRAVENOUS ONCE
Refills: 0 | Status: COMPLETED | OUTPATIENT
Start: 2022-05-13 | End: 2022-05-13

## 2022-05-13 RX ORDER — ACETAMINOPHEN 500 MG
650 TABLET ORAL ONCE
Refills: 0 | Status: COMPLETED | OUTPATIENT
Start: 2022-05-13 | End: 2022-05-13

## 2022-05-13 RX ADMIN — Medication 650 MILLIGRAM(S): at 21:00

## 2022-05-13 NOTE — ED PROVIDER NOTE - PHYSICAL EXAMINATION
CONSTITUTIONAL: Well-developed; well-nourished; in no acute distress.   SKIN: warm, dry  HEAD: Normocephalic; atraumatic.  EYES: PERRL, EOMI, normal sclera and conjunctiva   ENT: No nasal discharge; airway clear.  NECK: Supple; non tender.  CARD: S1, S2 normal; no murmurs, gallops, or rubs. tachycardia  RESP: No wheezes, rales or rhonchi.  ABD: soft ntnd  EXT: Normal ROM.  No clubbing, cyanosis or edema.   LYMPH: No acute cervical adenopathy.  NEURO: Alert, oriented, grossly unremarkable  PSYCH: Cooperative, appropriate.

## 2022-05-13 NOTE — ED PROVIDER NOTE - OBJECTIVE STATEMENT
38 yo female hx of dm, pseudoseizures, htn, prior suicidal attempts presenting with mild chest pain since tuesday, no alleviating or exacerbation factors and epigastric pain intermittently for the past year. no cough, fever, nausea, vomiting, diarrhea. Also reports having an argument with her parents and reports saying that "it would be better if I weren't alive". Currently denies SI/HI,AVH, drug/alcohol use.

## 2022-05-13 NOTE — ED PROVIDER NOTE - CARE PROVIDER_API CALL
Oskar John (DO)  Medicine  [address] [place] [address], [address] [place] [address]  Phone: [phone]  Fax: [phone]  Follow Up Time: 1-3 Days

## 2022-05-13 NOTE — ED PROVIDER NOTE - NSFOLLOWUPINSTRUCTIONS_ED_ALL_ED_FT
Nonspecific Chest Pain  ImageChest pain can be caused by many different conditions. There is always a chance that your pain could be related to something serious, such as a heart attack or a blood clot in your lungs. Chest pain can also be caused by conditions that are not life-threatening. If you have chest pain, it is very important to follow up with your health care provider.    What are the causes?  Causes of this condition include:    Heartburn.  Pneumonia or bronchitis.  Anxiety or stress.  Inflammation around your heart (pericarditis) or lung (pleuritis or pleurisy).  A blood clot in your lung.  A collapsed lung (pneumothorax). This can develop suddenly on its own (spontaneous pneumothorax) or from trauma to the chest.  Shingles infection (varicella-zoster virus).  Heart attack.  Damage to the bones, muscles, and cartilage that make up your chest wall. This can include:    Bruised bones due to injury.  Strained muscles or cartilage due to frequent or repeated coughing or overwork.  Fracture to one or more ribs.  Sore cartilage due to inflammation (costochondritis).      What increases the risk?  Risk factors for this condition may include:    Activities that increase your risk for trauma or injury to your chest.  Respiratory infections or conditions that cause frequent coughing.  Medical conditions or overeating that can cause heartburn.  Heart disease or family history of heart disease.  Conditions or health behaviors that increase your risk of developing a blood clot.  Having had chicken pox (varicella zoster).    What are the signs or symptoms?  Chest pain can feel like:    Burning or tingling on the surface of your chest or deep in your chest.  Crushing, pressure, aching, or squeezing pain.  Dull or sharp pain that is worse when you move, cough, or take a deep breath.  Pain that is also felt in your back, neck, shoulder, or arm, or pain that spreads to any of these areas.    Your chest pain may come and go, or it may stay constant.    How is this diagnosed?  Lab tests or other studies may be needed to find the cause of your pain. Your health care provider may have you take a test called an ECG (electrocardiogram). An ECG records your heartbeat patterns at the time the test is performed. You may also have other tests, such as:    Transthoracic echocardiogram (TTE). In this test, sound waves are used to create a picture of the heart structures and to look at how blood flows through your heart.  Transesophageal echocardiogram (ELAINA). This is a more advanced imaging test that takes images from inside your body. It allows your health care provider to see your heart in finer detail.  Cardiac monitoring. This allows your health care provider to monitor your heart rate and rhythm in real time.  Holter monitor. This is a portable device that records your heartbeat and can help to diagnose abnormal heartbeats. It allows your health care provider to track your heart activity for several days, if needed.  Stress tests. These can be done through exercise or by taking medicine that makes your heart beat more quickly.  Blood tests.  Other imaging tests.    How is this treated?  Treatment depends on what is causing your chest pain. Treatment may include:    Medicines. These may include:    Acid blockers for heartburn.  Anti-inflammatory medicine.  Pain medicine for inflammatory conditions.  Antibiotic medicine, if an infection is present.  Medicines to dissolve blood clots.  Medicines to treat coronary artery disease (CAD).    Supportive care for conditions that do not require medicines. This may include:    Resting.  Applying heat or cold packs to injured areas.  Limiting activities until pain decreases.      Follow these instructions at home:  Medicines     If you were prescribed an antibiotic, take it as told by your health care provider. Do not stop taking the antibiotic even if you start to feel better.  Take over-the-counter and prescription medicines only as told by your health care provider.  Lifestyle     Do not use any products that contain nicotine or tobacco, such as cigarettes and e-cigarettes. If you need help quitting, ask your health care provider.  Do not drink alcohol.  ImageMake lifestyle changes as directed by your health care provider. These may include:    Getting regular exercise. Ask your health care provider to suggest some activities that are safe for you.  Eating a heart-healthy diet. A registered dietitian can help you to learn healthy eating options.  Maintaining a healthy weight.  Managing diabetes, if necessary.  Reducing stress, such as with yoga or relaxation techniques.    General instructions     Avoid any activities that bring on chest pain.  If heartburn is the cause for your chest pain, raise (elevate) the head of your bed about 6 inches (15 cm) by putting blocks under the legs. Sleeping with more pillows does not effectively relieve heartburn because it only changes the position of your head.  Keep all follow-up visits as told by your health care provider. This is important. This includes any further testing if your chest pain does not go away.  Contact a health care provider if:  Your chest pain does not go away.  You have a rash with blisters on your chest.  You have a fever.  You have chills.  Get help right away if:  Your chest pain is worse.  You have a cough that gets worse, or you cough up blood.  You have severe pain in your abdomen.  You have severe weakness.  You faint.  You have sudden, unexplained chest discomfort.  You have sudden, unexplained discomfort in your arms, back, neck, or jaw.  You have shortness of breath at any time.  You suddenly start to sweat, or your skin gets clammy.  You feel nauseous or you vomit.  You suddenly feel light-headed or dizzy.  Your heart begins to beat quickly, or it feels like it is skipping beats.  These symptoms may represent a serious problem that is an emergency. Do not wait to see if the symptoms will go away. Get medical help right away. Call your local emergency services (911 in the [place] Do not drive yourself to the hospital.     This information is not intended to replace advice given to you by your health care provider. Make sure you discuss any questions you have with your health care provider.

## 2022-05-13 NOTE — ED PROVIDER NOTE - ATTENDING CONTRIBUTION TO CARE
Patient comes in for chronic chest and abdominal pains.  She was getting frustrated with her parents and said "maybe I don't want to be alive anymore" in a moment of heat.  For that remark she was forced to go for evaluation by the [facility]'s residence.  Patient denies any suicidality.    Exam: Regular rate and rhythm, lungs clear to auscultation, soft abdomen, no guarding, calm cooperative, no acute distress  Plan: Labs, chest x-ray, EKG

## 2022-05-13 NOTE — ED PROVIDER NOTE - PATIENT PORTAL LINK FT
You can access the FollowMyHealth Patient Portal offered by [facility] by registering at the following website: [email or web address]. By joining [employer]’s FollowMyHealth portal, you will also be able to view your health information using other applications (apps) compatible with our system.

## 2022-05-13 NOTE — ED PROVIDER NOTE - NS ED ROS FT
Constitutional:  see HPI  Head:  no headache, dizziness, loss of consciousness  Eyes:  no visual changes; no eye pain, redness, or discharge  ENMT:  no ear pain or discharge; no hearing problems; no mouth or throat sores or lesions; no throat pain  Cardiac: chest pain  Respiratory: no cough, wheezing, shortness of breath, chest tightness, or trouble breathing  GI: abd pain  :  no dysuria, frequency, or burning with urination; no change in urine output  MS: no myalgias, muscle weakness, joint pain,or  injury; no joint swelling  Neuro: no weakness; no numbness or tingling; no seizure  Skin:  no rashes or color changes; no lacerations or abrasions

## 2022-05-14 RX ADMIN — Medication 10 MILLIGRAM(S): at 00:09

## 2022-05-16 ENCOUNTER — OUTPATIENT (OUTPATIENT)
Dept: OUTPATIENT SERVICES | Facility: HOSPITAL | Age: 37
LOS: 1 days | Discharge: HOME | End: 2022-05-16

## 2022-05-16 ENCOUNTER — APPOINTMENT (OUTPATIENT)
Dept: INTERNAL MEDICINE | Facility: CLINIC | Age: 37
End: 2022-05-16
Payer: MEDICAID

## 2022-05-16 ENCOUNTER — NON-APPOINTMENT (OUTPATIENT)
Age: 37
End: 2022-05-16

## 2022-05-16 VITALS
HEIGHT: 64 IN | DIASTOLIC BLOOD PRESSURE: 87 MMHG | SYSTOLIC BLOOD PRESSURE: 143 MMHG | TEMPERATURE: 96.1 F | OXYGEN SATURATION: 99 % | HEART RATE: 103 BPM | BODY MASS INDEX: 26.63 KG/M2 | WEIGHT: 156 LBS

## 2022-05-16 DIAGNOSIS — R07.89 OTHER CHEST PAIN: ICD-10-CM

## 2022-05-16 PROCEDURE — 99213 OFFICE O/P EST LOW 20 MIN: CPT | Mod: GC

## 2022-05-16 RX ORDER — INSULIN LISPRO 100 U/ML
100 INJECTION, SOLUTION SUBCUTANEOUS DAILY
Qty: 30 | Refills: 5 | Status: DISCONTINUED | COMMUNITY
Start: 2021-06-08 | End: 2022-05-16

## 2022-05-16 NOTE — REVIEW OF SYSTEMS
[Vision Problems] : vision problems [Earache] : earache [Chest Pain] : chest pain [Palpitations] : palpitations [Abdominal Pain] : abdominal pain [Diarrhea] : diarrhea [Headache] : headache [Dizziness] : dizziness [Anxiety] : anxiety [Negative] : Genitourinary [Fever] : no fever [Chills] : no chills [Night Sweats] : no night sweats [Redness] : no redness [Itching] : no itching [Nasal Discharge] : no nasal discharge [Sore Throat] : no sore throat [Wheezing] : no wheezing [Cough] : no cough [Nausea] : no nausea [Vomiting] : no vomiting [Skin Rash] : no skin rash [Confusion] : no confusion [Memory Loss] : no memory loss [FreeTextEntry3] : ~ 1 month

## 2022-05-16 NOTE — ASSESSMENT
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 s/p pudendal nerve block for vulvodynia with no past improvement (recommended spinal stimulator but patient refused it, then estrogen cream was recommended but patient started experiencing breast tenderness and discontinued it, currently asymptomatic), Type 1 DM with chronic polyneuropathy of the lower extremities on an insulin pump, recurrent UTIs, PCOS (elevated testosterone level of 66), GERD, anxiety, and migraines who presents for a follow up visit.\par \par \par \par #Hx of Palpitations, Tachycardia\par # Hx of anxiety \par - echo in Oct 2021 showed EF 50-55%, trace TV and MV regurg, follows with Dr. Dhillon\par - pt seen psych, now taking amitriptyline 75 OD\par - C/w Metoprolol 50 mg BID and amitriptyline \par \par # DM Type I\par - Uncontrolled, consistently >200, self injects insulin PRN \par - HbA1c 8.7% Jan 2021\par - will repeat for next visit \par - Follows with Endo\par - Follows with Ophtho, Podiatry\par - C/w insulin pump\par \par #Hx of Migraines\par - Well controlled with NSAIDs as needed\par \par # Hx of tremors:\par - Following with neuro\par - c/w cyclobenzaprine and toprol\par \par #GERD\par - C/w Famotidine\par \par #Chronic pelvic pain, vulvodynia\par - Was following with Uro gyn, no longer following due to no relief offered \par - C/w Amitriptyline, Flexeril\par \par #HCM\par - Pap Smear 5/10/22, results pending\par - Mammo done 2020 BIRADS 1\par - pt says she is unable to take vaccine (flu and covid) d/t allergic reaction after flu vaccine previously. \par - will repeat routine labs: CBC, CMP, TSH, A1C, lipid panel, Vit D\par

## 2022-05-16 NOTE — PHYSICAL EXAM
[Well Nourished] : well nourished [Well Developed] : well developed [Well-Appearing] : well-appearing [Normal Sclera/Conjunctiva] : normal sclera/conjunctiva [Normal Outer Ear/Nose] : the outer ears and nose were normal in appearance [Supple] : supple [No Respiratory Distress] : no respiratory distress  [No Accessory Muscle Use] : no accessory muscle use [Clear to Auscultation] : lungs were clear to auscultation bilaterally [Regular Rhythm] : with a regular rhythm [Normal S1, S2] : normal S1 and S2 [No Edema] : there was no peripheral edema [Soft] : abdomen soft [Non-distended] : non-distended [No CVA Tenderness] : no CVA  tenderness [No Joint Swelling] : no joint swelling [No Rash] : no rash [No Focal Deficits] : no focal deficits [Alert and Oriented x3] : oriented to person, place, and time [de-identified] : tachycardic [de-identified] : mild distress [de-identified] : 2+ radial pulse bilaterally [de-identified] : suprapubic tenderness on palpation

## 2022-05-17 DIAGNOSIS — F41.9 ANXIETY DISORDER, UNSPECIFIED: ICD-10-CM

## 2022-05-17 DIAGNOSIS — E11.9 TYPE 2 DIABETES MELLITUS WITHOUT COMPLICATIONS: ICD-10-CM

## 2022-05-17 DIAGNOSIS — R51.9 HEADACHE, UNSPECIFIED: ICD-10-CM

## 2022-05-17 DIAGNOSIS — R07.89 OTHER CHEST PAIN: ICD-10-CM

## 2022-05-17 DIAGNOSIS — R10.9 UNSPECIFIED ABDOMINAL PAIN: ICD-10-CM

## 2022-05-17 DIAGNOSIS — Z00.00 ENCOUNTER FOR GENERAL ADULT MEDICAL EXAMINATION WITHOUT ABNORMAL FINDINGS: ICD-10-CM

## 2022-05-18 ENCOUNTER — EMERGENCY (EMERGENCY)
Facility: HOSPITAL | Age: 37
LOS: 0 days | Discharge: HOME | End: 2022-05-19
Attending: EMERGENCY MEDICINE | Admitting: EMERGENCY MEDICINE
Payer: MEDICAID

## 2022-05-18 VITALS
RESPIRATION RATE: 19 BRPM | HEART RATE: 87 BPM | OXYGEN SATURATION: 99 % | DIASTOLIC BLOOD PRESSURE: 63 MMHG | TEMPERATURE: 98 F | SYSTOLIC BLOOD PRESSURE: 118 MMHG

## 2022-05-18 VITALS — HEIGHT: 65 IN

## 2022-05-18 PROCEDURE — 99283 EMERGENCY DEPT VISIT LOW MDM: CPT

## 2022-05-18 NOTE — ED ADULT TRIAGE NOTE - IDEAL BODY WEIGHT(KG)
Chief complaint:   Chief Complaint   Patient presents with   • Numbness     right leg x 2 months       Vitals:  Visit Vitals  /66 (BP Location: RUE - Right upper extremity, Patient Position: Sitting, Cuff Size: Regular)   Pulse 66   Temp 98.4 °F (36.9 °C) (Temporal)   Resp 18   Ht 5' 8\" (1.727 m)   Wt 64 kg   LMP 09/23/2020   SpO2 98%   BMI 21.44 kg/m²       HISTORY OF PRESENT ILLNESS     Numbness  Associated symptoms include fatigue and numbness. Pertinent negatives include no fever, headaches or weakness.   Two months ago she started working out again by running and doing more of the elliptical .  She states after about a week of her workouts she started noticing anterior lower leg pain or shin splints.  She then went and had a deep tissue massage and since the massage she has noticed loss of sensation to both soft touch of the right lateral lower extremity.  She has not noticed any tripping, gait changes, difficultly walking, muscle weakness.  It has not worsened nor has improved.  She does not have any other numbness or tingling to any of the upper extremities.  She does occasionally have some urinary frequency.  She also has been fatigued.  This is not new for her.  She has been eating  A healthy eating plan and taking a multivitamin daily.    Other significant problems:  Patient Active Problem List    Diagnosis Date Noted   • Depression 04/27/2018     Priority: Low   • Cervical intraepithelial neoplasia grade 2 01/18/2013     Priority: Low     Declined LEEP, then repap 1/13 normal     • Hallux valgus (acquired) 10/14/2012     Priority: Low   • Panic disorder      Priority: Low   • Anxiety disorder      Priority: Low   • Allergic rhinitis      Priority: Low   • Cervical radiculopathy      Priority: Low       PAST MEDICAL, FAMILY AND SOCIAL HISTORY     Medications:  Current Outpatient Medications   Medication   • valACYclovir (VALTREX) 500 MG tablet   • Rhodiola 300 MG Cap   • Probiotic Product  (PROBIOTIC PO)   • Veronica Root Powder   • hydrOXYzine (ATARAX) 25 MG tablet   • alclometasone (ACLOVATE) 0.05 % cream   • adapalene (DIFFERIN) 0.3 % gel   • dapsone (ACZONE) 5 % gel   • VALERIAN ROOT PO   • fluticasone (FLONASE) 50 MCG/ACT nasal spray     No current facility-administered medications for this visit.        Allergies:  ALLERGIES:  No Known Allergies    Past Medical  History/Surgeries:  Past Medical History:   Diagnosis Date   • Abnormal Pap smear    • Anxiety disorder    • Depression    • Drug addiction (CMS/HCC)    • Genital HSV    • Panic disorder        Past Surgical History:   Procedure Laterality Date   • [place] tooth extraction         Family History:  Family History   Problem Relation Age of Onset   • Heart Mother         needs a defibrillator   • Arrhythmia Mother         has defibrillator   • Other Mother         precancerous cells on uterus   • Heart Paternal Grandfather         heart transplant   • TRANSPLANTATION RENAL Maternal Grandfather        Social History:  Social History     Tobacco Use   • Smoking status: Former Smoker     Packs/day: 0.00     Types: Cigarettes     Quit date: 2015     Years since quittin.3   • Smokeless tobacco: Never Used   Substance Use Topics   • Alcohol use: Yes     Alcohol/week: 0.0 standard drinks     Comment: occ       REVIEW OF SYSTEMS     Review of Systems   Constitutional: Positive for activity change and fatigue. Negative for fever.   Respiratory: Negative.    Cardiovascular: Negative.    Musculoskeletal: Negative.    Skin: Negative.    Neurological: Positive for numbness. Negative for dizziness, tremors, weakness and headaches.       PHYSICAL EXAM     Physical Exam  Constitutional:       General: She is not in acute distress.     Appearance: Normal appearance. She is not ill-appearing or toxic-appearing.   Cardiovascular:      Rate and Rhythm: Normal rate and regular rhythm.      Pulses: Normal pulses.      Heart sounds: Normal heart sounds. No  murmur.   Pulmonary:      Effort: Pulmonary effort is normal.      Breath sounds: Normal breath sounds. No wheezing or rhonchi.   Musculoskeletal: Normal range of motion.         General: No swelling, tenderness or deformity.      Right knee: She exhibits normal range of motion, no swelling, no ecchymosis, no deformity, no erythema, normal patellar mobility, no bony tenderness, normal meniscus and no MCL laxity. No tenderness found. No medial joint line, no lateral joint line, no MCL, no LCL and no patellar tendon tenderness noted.      Right lower leg: No edema.      Left lower leg: No edema.        Legs:       Comments: Plantar and dorsiflex feet against resistance.  Flex and extend LLE at knee.  No pain with palpation     Skin:     General: Skin is warm and dry.      Capillary Refill: Capillary refill takes less than 2 seconds.      Coloration: Skin is not pale.      Findings: No bruising, erythema, lesion or rash.   Neurological:      Mental Status: She is oriented to person, place, and time.      Sensory: Sensory deficit present.      Motor: No weakness.      Coordination: Coordination normal.      Gait: Gait normal.      Deep Tendon Reflexes: Reflexes normal.      Comments: Loss of soft touch and sharp sensation localized to right lower leg lateral aspect about 3 cm area. Deep peroneal nerve region   Psychiatric:         Mood and Affect: Mood normal.         Behavior: Behavior normal.         Thought Content: Thought content normal.         Judgment: Judgment normal.         ASSESSMENT/PLAN     Yadira was seen today for numbness.    Diagnoses and all orders for this visit:    Fatigue, unspecified type  -     CBC WITH DIFFERENTIAL; Future  -     COMPREHENSIVE METABOLIC PANEL  -     THYROID STIMULATING HORMONE; Future    Need for vaccination  -     TD TOXOID ADSORBED PRES FREE VACC 7+ YRS (TDVAX); Standing  -     Cancel: INFLUENZA QUADRIVALENT SPLIT PRES FREE 0.5 ML VACC, IM (FLULAVAL,FLUARIX,FLUZONE);  Standing  -     TD TOXOID ADSORBED PRES FREE VACC 7+ YRS (TDVAX)    Numbness in right leg  -     SEDIMENTATION RATE WESTERGREN; Future    Lipid screening  -     LIPID PANEL WITHOUT REFLEX      Probable peroneal nerve irritation.  Will continue to monitor for an additional 3 weeks.  Gentle stretching.  Avoid applying any pressure to the area.  No weakness, foot drop or gait change.  If this would occur or worsening to contact the office.   This will probably be self eliminating.  Avoid flip flops, running, elliptical for now, where she has to press off her toes or toe  aggravating the surrounding muscles.  Consider EMG studies if persist  Supervision Dr Deanna Estrada   [phone]

## 2022-05-18 NOTE — ED PROVIDER NOTE - NS ED ROS FT
Constitutional: no fever, chills, no recent weight loss, change in appetite or malaise  Cardiac: No chest pain, SOB or edema.  ENT: see HPI  Respiratory: No cough or respiratory distress  GI: No nausea, vomiting, diarrhea or abdominal pain.  MS: no pain to back or extremities  Neuro: No headache or weakness. No LOC.  Skin: No skin rash.  Except as documented in the HPI, all other systems are negative.

## 2022-05-18 NOTE — ED PROVIDER NOTE - PATIENT PORTAL LINK FT
You can access the FollowMyHealth Patient Portal offered by [facility] by registering at the following website: [email or web address]. By joining [employer]’s FollowMyHealth portal, you will also be able to view your health information using other applications (apps) compatible with our system.

## 2022-05-18 NOTE — ED PROVIDER NOTE - OBJECTIVE STATEMENT
37-year-old female, type I diabetic, presents to the ED for the assessment of foreign body in right ear.  Patient notes that just prior to arrival she was listening to music with ear buds and when she removed the earbud from her right ear, the plastic covering was still in her ear.  No pain or decreased hearing.

## 2022-05-18 NOTE — ED PROVIDER NOTE - NSFOLLOWUPINSTRUCTIONS_ED_ALL_ED_FT
Ear Foreign Body      An ear foreign body is an object that is stuck in the ear. The object is usually stuck in the ear canal.    Do not try to remove an ear foreign body by yourself. This could push the object farther into the ear. It is important to see a health care provider to have the object removed as soon as possible.      What are the causes?    It is common for young children to put objects into their ear canal. These may include food items, estefania, beads, parts of toys, and any other small objects that fit into the ear.    In adults, objects such as cotton swabs or hearing aid parts may get stuck in the ear canal.      What are the signs or symptoms?    An object in the ear may cause:  •Pain.      •Buzzing or roaring sounds.      •Hearing loss.      •Fluid or blood to come out of the ear.      •Nausea and vomiting.      •A feeling that your ear is stuffed up.        How is this diagnosed?    This condition may be diagnosed based on:  •Information you provide about what the object is and how it got stuck.      •Your symptoms.      •A physical exam.      •Tests of your hearing or the pressure inside your ear.        How is this treated?  Ear drops being put into an ear.   Treatment depends on what the object is, where it is in your ear, and whether any part of your ear is injured. If your health care provider can see the object in your ear, he or she may remove it by:  •Using a tool, such as medical tweezers (forceps) or a suction tube (catheter).      •Flushing your ear with water (irrigation). This is done only if the object is not likely to get bigger when it is put in water.      If your health care provider cannot see the object, or cannot remove it using one of these methods, you may need to see a specialist for removal.    Treatment may also include:  •Antibiotic medicine taken as pills or given as ear drops. These may be prescribed to prevent infection.      •Cleaning and treating any injuries in your ear.        Follow these instructions at home:    •Take over-the-counter and prescription medicines only as told by your health care provider.      •If you were prescribed an antibiotic medicine, such as pills or ear drops, take or use the antibiotic as told by your health care provider. Do not stop taking or using the antibiotic even if you start to feel better.    •To prevent getting objects stuck in your ear:  •Do not put anything into your ear, including cotton swabs. Talk with your health care provider about how to clean your ears safely.      •Keep small objects out of the reach of young children. Teach children not to put anything into their ears.        •Keep all follow-up visits. This is important.        Contact a health care provider if:    •You have a headache.      •You have a fever.      •You have pain or swelling that gets worse.      •You have reduced hearing in your ear.      •You have ringing in your ear.        Get help right away if:    •You notice blood or fluid coming from your ear.      •You have sudden hearing loss.        Summary    •An ear foreign body is an object that is stuck in the ear.      • Do not try to remove an ear foreign body by yourself. This can push the object farther into the ear.      •See a health care provider to have the object removed from your ear as soon as possible. This may keep you from developing an infection or hearing loss.      This information is not intended to replace advice given to you by your health care provider. Make sure you discuss any questions you have with your health care provider.

## 2022-05-18 NOTE — ED PROVIDER NOTE - PHYSICAL EXAMINATION
VITAL SIGNS: I have reviewed nursing notes and confirm.  CONSTITUTIONAL: Well-developed; well-nourished; in no acute distress.  SKIN: Skin exam is warm and dry, no acute rash.  HEAD: Normocephalic; atraumatic.  EYES: PERRL, EOM intact; conjunctiva and sclera clear.  ENT: No nasal discharge; airway clear. teal, soft FB in R ear, after removal both TMs normal, canal without redness, maceration, retained FB  NECK: Supple; non tender.  CARD: S1, S2 normal; no murmurs, gallops, or rubs. Regular rate and rhythm.  RESP: No wheezes, rales or rhonchi.  ABD: Normal bowel sounds; soft; non-distended; non-tender  EXT: Normal ROM.   NEURO: Alert, oriented. Grossly unremarkable. No focal deficits.  PSYCH: Cooperative, appropriate.

## 2022-05-19 ENCOUNTER — NON-APPOINTMENT (OUTPATIENT)
Age: 37
End: 2022-05-19

## 2022-05-19 DIAGNOSIS — Z88.8 ALLERGY STATUS TO OTHER DRUGS, MEDICAMENTS AND BIOLOGICAL SUBSTANCES: ICD-10-CM
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
## 2022-05-19 DIAGNOSIS — Z79.82 LONG TERM (CURRENT) USE OF ASPIRIN: ICD-10-CM
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
## 2022-05-19 DIAGNOSIS — Z88.1 ALLERGY STATUS TO OTHER ANTIBIOTIC AGENTS STATUS: ICD-10-CM

## 2022-05-19 DIAGNOSIS — E10.9 TYPE 1 DIABETES MELLITUS WITHOUT COMPLICATIONS: ICD-10-CM

## 2022-05-19 DIAGNOSIS — Z88.0 ALLERGY STATUS TO PENICILLIN: ICD-10-CM
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
## 2022-05-19 DIAGNOSIS — Z79.4 LONG TERM (CURRENT) USE OF INSULIN: ICD-10-CM

## 2022-05-19 DIAGNOSIS — T16.1XXA FOREIGN BODY IN RIGHT EAR, INITIAL ENCOUNTER: ICD-10-CM

## 2022-05-19 DIAGNOSIS — Z88.7 ALLERGY STATUS TO SERUM AND VACCINE: ICD-10-CM

## 2022-05-19 NOTE — ED ADULT NURSE NOTE - OBJECTIVE STATEMENT
Patient presents to ED in NAD a+ox4 co ear bud stuck in right ear. Pt denies change in hearing or any other complaints. Object removed by MD

## 2022-05-19 NOTE — ED PROCEDURE NOTE - CPROC ED FOREIGN BODY DETAIL1
normal canal and TM/The area was draped and prepped and the anatomic location of the suspected foreign body was explored in a bloodless field.

## 2022-05-23 ENCOUNTER — APPOINTMENT (OUTPATIENT)
Dept: NEUROLOGY | Facility: CLINIC | Age: 37
End: 2022-05-23

## 2022-05-26 ENCOUNTER — OUTPATIENT (OUTPATIENT)
Dept: OUTPATIENT SERVICES | Facility: HOSPITAL | Age: 37
LOS: 1 days | Discharge: HOME | End: 2022-05-26

## 2022-05-26 ENCOUNTER — APPOINTMENT (OUTPATIENT)
Dept: NUTRITION | Facility: CLINIC | Age: 37
End: 2022-05-26

## 2022-05-27 ENCOUNTER — APPOINTMENT (OUTPATIENT)
Dept: GASTROENTEROLOGY | Facility: CLINIC | Age: 37
End: 2022-05-27

## 2022-06-03 ENCOUNTER — APPOINTMENT (OUTPATIENT)
Dept: PSYCHIATRY | Facility: CLINIC | Age: 37
End: 2022-06-03
Payer: MEDICAID

## 2022-06-03 ENCOUNTER — OUTPATIENT (OUTPATIENT)
Dept: OUTPATIENT SERVICES | Facility: HOSPITAL | Age: 37
LOS: 1 days | Discharge: HOME | End: 2022-06-03

## 2022-06-03 DIAGNOSIS — F41.1 GENERALIZED ANXIETY DISORDER: ICD-10-CM

## 2022-06-03 DIAGNOSIS — F41.9 ANXIETY DISORDER, UNSPECIFIED: ICD-10-CM

## 2022-06-03 PROCEDURE — 99214 OFFICE O/P EST MOD 30 MIN: CPT

## 2022-06-09 ENCOUNTER — EMERGENCY (EMERGENCY)
Facility: HOSPITAL | Age: 37
LOS: 0 days | Discharge: NURSING, REHAB OR LONG-TERM CARE | End: 2022-06-09
Attending: EMERGENCY MEDICINE | Admitting: EMERGENCY MEDICINE
Payer: MEDICAID

## 2022-06-09 VITALS
SYSTOLIC BLOOD PRESSURE: 128 MMHG | HEART RATE: 94 BPM | DIASTOLIC BLOOD PRESSURE: 72 MMHG | OXYGEN SATURATION: 99 % | TEMPERATURE: 98 F | HEIGHT: 65 IN | RESPIRATION RATE: 18 BRPM | WEIGHT: 119.93 LBS

## 2022-06-09 DIAGNOSIS — Z88.7 ALLERGY STATUS TO SERUM AND VACCINE: ICD-10-CM

## 2022-06-09 DIAGNOSIS — R30.9 PAINFUL MICTURITION, UNSPECIFIED: ICD-10-CM

## 2022-06-09 DIAGNOSIS — Z79.84 LONG TERM (CURRENT) USE OF ORAL HYPOGLYCEMIC DRUGS: ICD-10-CM

## 2022-06-09 DIAGNOSIS — R10.9 UNSPECIFIED ABDOMINAL PAIN: ICD-10-CM

## 2022-06-09 DIAGNOSIS — K21.9 GASTRO-ESOPHAGEAL REFLUX DISEASE WITHOUT ESOPHAGITIS: ICD-10-CM

## 2022-06-09 DIAGNOSIS — R35.0 FREQUENCY OF MICTURITION: ICD-10-CM

## 2022-06-09 DIAGNOSIS — Z02.9 ENCOUNTER FOR ADMINISTRATIVE EXAMINATIONS, UNSPECIFIED: ICD-10-CM

## 2022-06-09 DIAGNOSIS — E10.65 TYPE 1 DIABETES MELLITUS WITH HYPERGLYCEMIA: ICD-10-CM

## 2022-06-09 DIAGNOSIS — Z79.4 LONG TERM (CURRENT) USE OF INSULIN: ICD-10-CM

## 2022-06-09 DIAGNOSIS — Z88.1 ALLERGY STATUS TO OTHER ANTIBIOTIC AGENTS STATUS: ICD-10-CM

## 2022-06-09 DIAGNOSIS — F32.9 MAJOR DEPRESSIVE DISORDER, SINGLE EPISODE, UNSPECIFIED: ICD-10-CM

## 2022-06-09 DIAGNOSIS — Z88.0 ALLERGY STATUS TO PENICILLIN: ICD-10-CM

## 2022-06-09 DIAGNOSIS — Z88.8 ALLERGY STATUS TO OTHER DRUGS, MEDICAMENTS AND BIOLOGICAL SUBSTANCES: ICD-10-CM

## 2022-06-09 DIAGNOSIS — E10.9 TYPE 1 DIABETES MELLITUS WITHOUT COMPLICATIONS: ICD-10-CM

## 2022-06-09 LAB
ALBUMIN SERPL ELPH-MCNC: 4.4 G/DL — SIGNIFICANT CHANGE UP (ref 3.5–5.2)
ALP SERPL-CCNC: 136 U/L — HIGH (ref 30–115)
ALT FLD-CCNC: 26 U/L — SIGNIFICANT CHANGE UP (ref 0–41)
ANION GAP SERPL CALC-SCNC: 12 MMOL/L — SIGNIFICANT CHANGE UP (ref 7–14)
APPEARANCE UR: CLEAR — SIGNIFICANT CHANGE UP
AST SERPL-CCNC: 18 U/L — SIGNIFICANT CHANGE UP (ref 0–41)
B-OH-BUTYR SERPL-SCNC: <0.2 MMOL/L — SIGNIFICANT CHANGE UP
BACTERIA # UR AUTO: ABNORMAL
BASE EXCESS BLDV CALC-SCNC: 0.9 MMOL/L — SIGNIFICANT CHANGE UP (ref -2–3)
BASOPHILS # BLD AUTO: 0.06 K/UL — SIGNIFICANT CHANGE UP (ref 0–0.2)
BASOPHILS NFR BLD AUTO: 0.5 % — SIGNIFICANT CHANGE UP (ref 0–1)
BILIRUB SERPL-MCNC: <0.2 MG/DL — SIGNIFICANT CHANGE UP (ref 0.2–1.2)
BILIRUB UR-MCNC: NEGATIVE — SIGNIFICANT CHANGE UP
BUN SERPL-MCNC: 9 MG/DL — LOW (ref 10–20)
CA-I SERPL-SCNC: 1.24 MMOL/L — SIGNIFICANT CHANGE UP (ref 1.15–1.33)
CALCIUM SERPL-MCNC: 9.4 MG/DL — SIGNIFICANT CHANGE UP (ref 8.5–10.1)
CHLORIDE SERPL-SCNC: 101 MMOL/L — SIGNIFICANT CHANGE UP (ref 98–110)
CO2 SERPL-SCNC: 24 MMOL/L — SIGNIFICANT CHANGE UP (ref 17–32)
COD CRY URNS QL: NEGATIVE — SIGNIFICANT CHANGE UP
COLOR SPEC: YELLOW — SIGNIFICANT CHANGE UP
CREAT SERPL-MCNC: 0.6 MG/DL — LOW (ref 0.7–1.5)
DIFF PNL FLD: ABNORMAL
EGFR: 118 ML/MIN/1.73M2 — SIGNIFICANT CHANGE UP
EOSINOPHIL # BLD AUTO: 0.07 K/UL — SIGNIFICANT CHANGE UP (ref 0–0.7)
EOSINOPHIL NFR BLD AUTO: 0.6 % — SIGNIFICANT CHANGE UP (ref 0–8)
EPI CELLS # UR: ABNORMAL /HPF
GAS PNL BLDV: 133 MMOL/L — LOW (ref 136–145)
GAS PNL BLDV: SIGNIFICANT CHANGE UP
GLUCOSE SERPL-MCNC: 221 MG/DL — HIGH (ref 70–99)
GLUCOSE UR QL: 500 MG/DL
GRAN CASTS # UR COMP ASSIST: NEGATIVE — SIGNIFICANT CHANGE UP
HCG SERPL QL: NEGATIVE — SIGNIFICANT CHANGE UP
HCO3 BLDV-SCNC: 27 MMOL/L — SIGNIFICANT CHANGE UP (ref 22–29)
HCT VFR BLD CALC: 30.4 % — LOW (ref 37–47)
HCT VFR BLDA CALC: 34 % — LOW (ref 39–51)
HGB BLD-MCNC: 8.7 G/DL — LOW (ref 12–16)
HYALINE CASTS # UR AUTO: NEGATIVE — SIGNIFICANT CHANGE UP
IMM GRANULOCYTES NFR BLD AUTO: 0.4 % — HIGH (ref 0.1–0.3)
KETONES UR-MCNC: NEGATIVE — SIGNIFICANT CHANGE UP
LACTATE BLDV-MCNC: 1.1 MMOL/L — SIGNIFICANT CHANGE UP (ref 0.5–2)
LEUKOCYTE ESTERASE UR-ACNC: NEGATIVE — SIGNIFICANT CHANGE UP
LIDOCAIN IGE QN: 19 U/L — SIGNIFICANT CHANGE UP (ref 7–60)
LYMPHOCYTES # BLD AUTO: 1.9 K/UL — SIGNIFICANT CHANGE UP (ref 1.2–3.4)
LYMPHOCYTES # BLD AUTO: 16 % — LOW (ref 20.5–51.1)
MCHC RBC-ENTMCNC: 20.4 PG — LOW (ref 27–31)
MCHC RBC-ENTMCNC: 28.6 G/DL — LOW (ref 32–37)
MCV RBC AUTO: 71.2 FL — LOW (ref 81–99)
MONOCYTES # BLD AUTO: 0.7 K/UL — HIGH (ref 0.1–0.6)
MONOCYTES NFR BLD AUTO: 5.9 % — SIGNIFICANT CHANGE UP (ref 1.7–9.3)
NEUTROPHILS # BLD AUTO: 9.1 K/UL — HIGH (ref 1.4–6.5)
NEUTROPHILS NFR BLD AUTO: 76.6 % — HIGH (ref 42.2–75.2)
NITRITE UR-MCNC: NEGATIVE — SIGNIFICANT CHANGE UP
NRBC # BLD: 0 /100 WBCS — SIGNIFICANT CHANGE UP (ref 0–0)
PCO2 BLDV: 49 MMHG — HIGH (ref 39–42)
PH BLDV: 7.35 — SIGNIFICANT CHANGE UP (ref 7.32–7.43)
PH UR: 7 — SIGNIFICANT CHANGE UP (ref 5–8)
PLATELET # BLD AUTO: 529 K/UL — HIGH (ref 130–400)
PO2 BLDV: 28 MMHG — SIGNIFICANT CHANGE UP
POTASSIUM BLDV-SCNC: 4.4 MMOL/L — SIGNIFICANT CHANGE UP (ref 3.5–5.1)
POTASSIUM SERPL-MCNC: 4.6 MMOL/L — SIGNIFICANT CHANGE UP (ref 3.5–5)
POTASSIUM SERPL-SCNC: 4.6 MMOL/L — SIGNIFICANT CHANGE UP (ref 3.5–5)
PROT SERPL-MCNC: 7.5 G/DL — SIGNIFICANT CHANGE UP (ref 6–8)
PROT UR-MCNC: NEGATIVE MG/DL — SIGNIFICANT CHANGE UP
RBC # BLD: 4.27 M/UL — SIGNIFICANT CHANGE UP (ref 4.2–5.4)
RBC # FLD: 17.9 % — HIGH (ref 11.5–14.5)
RBC CASTS # UR COMP ASSIST: ABNORMAL /HPF
SODIUM SERPL-SCNC: 137 MMOL/L — SIGNIFICANT CHANGE UP (ref 135–146)
SP GR SPEC: 1.02 — SIGNIFICANT CHANGE UP (ref 1.01–1.03)
TRI-PHOS CRY UR QL COMP ASSIST: NEGATIVE — SIGNIFICANT CHANGE UP
URATE CRY FLD QL MICRO: NEGATIVE — SIGNIFICANT CHANGE UP
UROBILINOGEN FLD QL: 0.2 MG/DL — SIGNIFICANT CHANGE UP
WBC # BLD: 11.88 K/UL — HIGH (ref 4.8–10.8)
WBC # FLD AUTO: 11.88 K/UL — HIGH (ref 4.8–10.8)
WBC UR QL: SIGNIFICANT CHANGE UP /HPF

## 2022-06-09 PROCEDURE — 74176 CT ABD & PELVIS W/O CONTRAST: CPT | Mod: 26,MA

## 2022-06-09 PROCEDURE — 99285 EMERGENCY DEPT VISIT HI MDM: CPT

## 2022-06-09 RX ORDER — ACETAMINOPHEN 500 MG
650 TABLET ORAL ONCE
Refills: 0 | Status: COMPLETED | OUTPATIENT
Start: 2022-06-09 | End: 2022-06-09

## 2022-06-09 RX ORDER — SODIUM CHLORIDE 9 MG/ML
1000 INJECTION, SOLUTION INTRAVENOUS ONCE
Refills: 0 | Status: COMPLETED | OUTPATIENT
Start: 2022-06-09 | End: 2022-06-09

## 2022-06-09 RX ADMIN — Medication 650 MILLIGRAM(S): at 16:04

## 2022-06-09 RX ADMIN — SODIUM CHLORIDE 1000 MILLILITER(S): 9 INJECTION, SOLUTION INTRAVENOUS at 12:51

## 2022-06-09 NOTE — ED ADULT NURSE NOTE - NSICDXNOPASTSURGICALHX_GEN_ALL_CORE
Post-Op Assessment Note    CV Status:  Stable    Pain management: adequate     Mental Status:  Alert and awake   Hydration Status:  Euvolemic   PONV Controlled:  Controlled   Airway Patency:  Patent   Post Op Vitals Reviewed: Yes      Staff: Anesthesiologist           BP     Temp      Pulse     Resp      SpO2 <-- Click to add NO significant Past Surgical History

## 2022-06-09 NOTE — ED PROVIDER NOTE - OBJECTIVE STATEMENT
37F hx of dm presents from group home for urinary frequency and hyperglycemia, notes mild abdominal pain for 2 days and nausea without fever/back pain/chest pain/shortness of breath/syncope. no vaginal bleeding.

## 2022-06-09 NOTE — ED PROVIDER NOTE - CARE PLAN
Assessment and plan of treatment:	Plan: Labs, urine, imaging, ivf, pain control, reassess.   1 Principal Discharge DX:	Hyperglycemia  Assessment and plan of treatment:	Plan: Labs, urine, imaging, ivf, pain control, reassess.   Principal Discharge DX:	Hyperglycemia  Assessment and plan of treatment:	Plan: Labs, urine, imaging, ivf, pain control, reassess.  Secondary Diagnosis:	Urinary symptom or sign

## 2022-06-09 NOTE — ED ADULT NURSE NOTE - OBJECTIVE STATEMENT
pt complaining of burning with urination and abdominal pain for 1 week  "it feels like a uti"   pt denies fevers, nausea or vomiting

## 2022-06-09 NOTE — ED PROVIDER NOTE - NSFOLLOWUPINSTRUCTIONS_ED_ALL_ED_FT
Hyperglycemia    Hyperglycemia occurs when the glucose (a sugar) level in your blood is too high. Symptoms include increased urination, increased thirst, a dry mouth, or changes in appetite. If started on a medication, take exactly as prescribed by your health care professional. Maintain a healthy lifestyle and follow up with your primary care physician.    SEEK IMMEDIATE MEDICAL CARE IF YOU HAVE THE FOLLOWING SYMPTOMS: shortness of breath, change in mental status, nausea or vomiting, fruity smell to your breath, or any signs of dehydration.      Abdominal Pain    Many things can cause abdominal pain. Usually, abdominal pain is not caused by a disease and will improve without treatment. Your health care provider will do a physical exam and possibly order blood tests and imaging to help determine the seriousness of your pain. However, in many cases, no cause may be found and you may need further testing as an outpatient. Monitor your abdominal pain for any changes.     SEEK IMMEDIATE MEDICAL CARE IF YOU HAVE THE FOLLOWING SYMPTOMS: worsening abdominal pain, vomiting, diarrhea, inability to have bowel movements or pass gas, black or bloody stool, fever accompanying chest pain or back pain, or dizziness/lightheadedness. Hyperglycemia    Hyperglycemia occurs when the glucose (a sugar) level in your blood is too high. Symptoms include increased urination, increased thirst, a dry mouth, or changes in appetite. If started on a medication, take exactly as prescribed by your health care professional. Maintain a healthy lifestyle and follow up with your primary care physician.    SEEK IMMEDIATE MEDICAL CARE IF YOU HAVE THE FOLLOWING SYMPTOMS: shortness of breath, change in mental status, nausea or vomiting, fruity smell to your breath, or any signs of dehydration.      Abdominal Pain    Many things can cause abdominal pain. Usually, abdominal pain is not caused by a disease and will improve without treatment. Your health care provider will do a physical exam and possibly order blood tests and imaging to help determine the seriousness of your pain. However, in many cases, no cause may be found and you may need further testing as an outpatient. Monitor your abdominal pain for any changes.     SEEK IMMEDIATE MEDICAL CARE IF YOU HAVE THE FOLLOWING SYMPTOMS: worsening abdominal pain, vomiting, diarrhea, inability to have bowel movements or pass gas, black or bloody stool, fever accompanying chest pain or back pain, or dizziness/lightheadedness.    Urinary Tract Infection    A urinary tract infection (UTI) is an infection of any part of the urinary tract, which includes the kidneys, ureters, bladder, and urethra. Risk factors include ignoring your need to urinate, wiping back to front if female, being an uncircumcised male, and having diabetes or a weak immune system. Symptoms include frequent urination, pain or burning with urination, foul smelling urine, cloudy urine, pain in the lower abdomen, blood in the urine, and fever. If you were prescribed an antibiotic medicine, take it as told by your health care provider. Do not stop taking the antibiotic even if you start to feel better.    SEEK IMMEDIATE MEDICAL CARE IF YOU HAVE ANY OF THE FOLLOWING SYMPTOMS: severe back or abdominal pain, fever, inability to keep fluids or medicine down, dizziness/lightheadedness, or a change in mental status.

## 2022-06-09 NOTE — ED PROVIDER NOTE - PROGRESS NOTE DETAILS
ED Attending OBIE Barreto  Patient aware of all results, history of anemia, previous 8.2, hemoglobin today 8.7, serum prep negative, glucose 221, reports will take her medications, prescribed, anion gap negative at 12, pH normal 7.35, beta hydroxy negative, urine with negative leukocytes, occasional epithelial cells, due to urinary symptoms discussed antibiotics, which patient prefers, urine culture sent.  Copy of results provided.  Patient aware signs and symptoms to return for, will follow up with PMD as discussed. ED Attending OBIE Barreto  Patient aware of all results, history of anemia, previous 8.2, hemoglobin today 8.7, serum preg negative, glucose 221, reports will take her medications,  anion gap negative at 12, pH normal 7.35, beta hydroxy negative, urine with negative leukocytes, occasional epithelial cells, due to urinary symptoms discussed antibiotics, which patient prefers, urine culture sent.  pt reports no allergy to bactrim and states this has helped her before. Copy of results provided.  Patient aware signs and symptoms to return for, will follow up with PMD as discussed. ED Attending OBIE Barreto  Pt endorsed to Dr. Greenfield, pending transport.

## 2022-06-09 NOTE — ED PROVIDER NOTE - PHYSICAL EXAMINATION
Vital Signs: I have reviewed the initial vital signs.  Constitutional: appears stated age, no acute distress.  HEENT: Airway patent, moist MM, no erythema/swelling/deformity of oral structures. EOMI, PERRLA.  CV: regular rate, regular rhythm, well-perfused extremities, 2+ b/l DP and radial pulses equal.  Lungs: BCTA, no increased WOB.  ABD: soft, mild lower abdominal ttp, ND, no guarding or rebound, no pulsatile mass, no hernias, no flank pain.   MSK: Neck supple, nontender, nl ROM, no stepoff. Chest nontender. Back nontender in TLS spine or to b/l bony structures. Ext nontender, nl rom, no deformity.   INTEG: Skin warm, dry, no rash.  NEURO: A&Ox3, moving all extremities, normal speech  PSYCH: Calm, cooperative, normal affect and interaction.

## 2022-06-09 NOTE — ED PROVIDER NOTE - ATTENDING CONTRIBUTION TO CARE
37-year-old female past medical history of diabetes presents with approximately a week of suprapubic discomfort, throbbing in nature, constant, radiating to the right flank, associated with burning upon urination, moderate in intensity, no alleviating or precipitating factors.  Patient reports last menstrual period May 18.    No fever, chills, n/v, cp,  pleuritic cp, sob, palpitations, diaphoresis, cough, ha/lh/dizziness, numbness/tingling, neck pain/ stiffness, diarrhea, constipation, melena/brbpr, vaginal bleeding/discharge/odor, trauma, weakness, edema, calf pain/swelling/erythema, sick contacts, recent travel or rash. pt states she is not sexually active.    On Exam: Vital Signs: I have reviewed the initial vital signs. Constitutional: Non toxic appearing pt sitting on stretcher speaking full sentences. Integumentary: No rash. No jaundice. EYES: No sclera Icterus. ENT: MMM NECK: Supple, non-tender, no meningeal signs. Cardiovascular: RRR, radial pulses 2/4 b/l. No JVD. Respiratory: BS present b/l, ctabl, no wheezing or crackles, no accessory muscle use, no stridor. Gastrointestinal: BS present throughout all 4 quadrants, soft, nd, suprapubic discomfort to palpation, no rebound tenderness or guarding, (+) R cvat. (-) Rojo's (-) Rovsing (-) Obturator (-) Psoas, Musculoskeletal: FROM, no edema, no calf pain/swelling/erythema. Neurologic: AAOx3, motor 5/5 and sensation intact throughout upper and lowe ext, CN II-XII intact, No facial droop or slurring of speech. No focal deficits.

## 2022-06-09 NOTE — ED PROVIDER NOTE - PATIENT PORTAL LINK FT
You can access the FollowMyHealth Patient Portal offered by [facility] by registering at the following website: [email or web address]. By joining [employer]’s FollowMyHealth portal, you will also be able to view your health information using other applications (apps) compatible with our system.

## 2022-06-10 LAB
CULTURE RESULTS: SIGNIFICANT CHANGE UP
SPECIMEN SOURCE: SIGNIFICANT CHANGE UP

## 2022-06-10 NOTE — ED PROVIDER NOTE - NS ED MD DISPO DISCHARGE CCDA
You are being discharged from [facility]. Please call to schedule a follow up appointment with Dr. Hurd as previously discussed in 1week. Please take the medications prescribed for you pre-operatively in the office by the nurse practitioner including the lovenox injections. Do not take NSAIDs or aspirin for pain. You have also been prescribed zofran for nausea, please only take as needed. You may take the carafate as needed up to 4 times a day.  You may shower, but do not submerge in a water bath, and do not scrub at your incisions. Please avoid heavy weight lifting (nothing greater than 10 lbs), or strenuous activity for the next 4-6 weeks.  If you have any further questions about your care, please do not hesitate to contact Dr. Hurd's office or return to the Emergency Department.  Patient/Caregiver provided printed discharge information.

## 2022-06-13 ENCOUNTER — APPOINTMENT (OUTPATIENT)
Dept: GASTROENTEROLOGY | Facility: CLINIC | Age: 37
End: 2022-06-13
Payer: MEDICAID

## 2022-06-13 VITALS — DIASTOLIC BLOOD PRESSURE: 79 MMHG | SYSTOLIC BLOOD PRESSURE: 129 MMHG | HEART RATE: 89 BPM | OXYGEN SATURATION: 99 %

## 2022-06-13 PROCEDURE — 99214 OFFICE O/P EST MOD 30 MIN: CPT

## 2022-06-13 RX ORDER — METOPROLOL TARTRATE 50 MG/1
50 TABLET, FILM COATED ORAL
Qty: 60 | Refills: 3 | Status: DISCONTINUED | COMMUNITY
End: 2022-06-13

## 2022-06-13 NOTE — HISTORY OF PRESENT ILLNESS
[de-identified] : 37-year-old female patient with severe anxiety being followed up for diffuse chronic abdominal pain.\par She had 1 self-limited episode of colitis a few months ago with bloody bowel movements.  Since then there has been no recurrence.  She also remains anemic\par Today she is complaining of diffuse abdominal pain but does not seem to be in apparent gets distress

## 2022-06-13 NOTE — ASSESSMENT
[FreeTextEntry1] : Patient being followed up for chronic abdominal pain the etiology of which is unclear multiple CAT scans have proved to be negative.\par To being followed up for abnormal LFTs however on her last blood tests apart from the alkaline phosphatase the LFTs were normal.  Hep serology negative SETH AMA also negative\par Patient continues to be anemic will discuss with her primary care physician and then decide about colonoscopy and endoscopy.\par

## 2022-06-13 NOTE — PHYSICAL EXAM
[General Appearance - Alert] : alert [General Appearance - Well Nourished] : well nourished [General Appearance - Well Developed] : well developed [Sclera] : the sclera and conjunctiva were normal [Outer Ear] : the ears and nose were normal in appearance [Neck Appearance] : the appearance of the neck was normal [] : no respiratory distress [Auscultation Breath Sounds / Voice Sounds] : lungs were clear to auscultation bilaterally [Apical Impulse] : the apical impulse was normal [Heart Sounds] : normal S1 and S2 [Murmurs] : no murmurs [Bowel Sounds] : normal bowel sounds [Abdomen Tenderness] : non-tender [Abdomen Hernia] : no hernia was discovered

## 2022-06-16 ENCOUNTER — EMERGENCY (EMERGENCY)
Facility: HOSPITAL | Age: 37
LOS: 0 days | Discharge: NURSING, REHAB OR LONG-TERM CARE | End: 2022-06-17
Attending: EMERGENCY MEDICINE | Admitting: EMERGENCY MEDICINE
Payer: MEDICAID

## 2022-06-16 VITALS
HEIGHT: 65 IN | DIASTOLIC BLOOD PRESSURE: 69 MMHG | WEIGHT: 158.07 LBS | OXYGEN SATURATION: 99 % | HEART RATE: 110 BPM | TEMPERATURE: 97 F | RESPIRATION RATE: 20 BRPM | SYSTOLIC BLOOD PRESSURE: 127 MMHG

## 2022-06-16 DIAGNOSIS — Z79.4 LONG TERM (CURRENT) USE OF INSULIN: ICD-10-CM

## 2022-06-16 DIAGNOSIS — R10.84 GENERALIZED ABDOMINAL PAIN: ICD-10-CM

## 2022-06-16 DIAGNOSIS — Z88.7 ALLERGY STATUS TO SERUM AND VACCINE: ICD-10-CM

## 2022-06-16 DIAGNOSIS — Z88.1 ALLERGY STATUS TO OTHER ANTIBIOTIC AGENTS STATUS: ICD-10-CM

## 2022-06-16 DIAGNOSIS — R10.13 EPIGASTRIC PAIN: ICD-10-CM

## 2022-06-16 DIAGNOSIS — Z87.39 PERSONAL HISTORY OF OTHER DISEASES OF THE MUSCULOSKELETAL SYSTEM AND CONNECTIVE TISSUE: ICD-10-CM

## 2022-06-16 DIAGNOSIS — G89.29 OTHER CHRONIC PAIN: ICD-10-CM

## 2022-06-16 DIAGNOSIS — Z96.41 PRESENCE OF INSULIN PUMP (EXTERNAL) (INTERNAL): ICD-10-CM

## 2022-06-16 DIAGNOSIS — R53.83 OTHER FATIGUE: ICD-10-CM

## 2022-06-16 DIAGNOSIS — Z88.8 ALLERGY STATUS TO OTHER DRUGS, MEDICAMENTS AND BIOLOGICAL SUBSTANCES: ICD-10-CM

## 2022-06-16 DIAGNOSIS — Z79.82 LONG TERM (CURRENT) USE OF ASPIRIN: ICD-10-CM

## 2022-06-16 DIAGNOSIS — Z87.440 PERSONAL HISTORY OF URINARY (TRACT) INFECTIONS: ICD-10-CM

## 2022-06-16 DIAGNOSIS — K21.9 GASTRO-ESOPHAGEAL REFLUX DISEASE WITHOUT ESOPHAGITIS: ICD-10-CM

## 2022-06-16 DIAGNOSIS — F32.5 MAJOR DEPRESSIVE DISORDER, SINGLE EPISODE, IN FULL REMISSION: ICD-10-CM

## 2022-06-16 DIAGNOSIS — E10.9 TYPE 1 DIABETES MELLITUS WITHOUT COMPLICATIONS: ICD-10-CM

## 2022-06-16 DIAGNOSIS — Z88.0 ALLERGY STATUS TO PENICILLIN: ICD-10-CM

## 2022-06-16 DIAGNOSIS — Z20.822 CONTACT WITH AND (SUSPECTED) EXPOSURE TO COVID-19: ICD-10-CM

## 2022-06-16 LAB
ALBUMIN SERPL ELPH-MCNC: 4.2 G/DL — SIGNIFICANT CHANGE UP (ref 3.5–5.2)
ALP SERPL-CCNC: 138 U/L — HIGH (ref 30–115)
ALT FLD-CCNC: 22 U/L — SIGNIFICANT CHANGE UP (ref 0–41)
ANION GAP SERPL CALC-SCNC: 9 MMOL/L — SIGNIFICANT CHANGE UP (ref 7–14)
ANISOCYTOSIS BLD QL: SIGNIFICANT CHANGE UP
AST SERPL-CCNC: 14 U/L — SIGNIFICANT CHANGE UP (ref 0–41)
BASE EXCESS BLDV CALC-SCNC: 1.9 MMOL/L — SIGNIFICANT CHANGE UP (ref -2–3)
BASOPHILS # BLD AUTO: 0.06 K/UL — SIGNIFICANT CHANGE UP (ref 0–0.2)
BASOPHILS NFR BLD AUTO: 0.8 % — SIGNIFICANT CHANGE UP (ref 0–1)
BILIRUB DIRECT SERPL-MCNC: <0.2 MG/DL — SIGNIFICANT CHANGE UP (ref 0–0.3)
BILIRUB INDIRECT FLD-MCNC: < 0.2 MG/DL — SIGNIFICANT CHANGE UP (ref 0.2–1.2)
BILIRUB SERPL-MCNC: <0.2 MG/DL — SIGNIFICANT CHANGE UP (ref 0.2–1.2)
BUN SERPL-MCNC: 9 MG/DL — LOW (ref 10–20)
CA-I SERPL-SCNC: 1.2 MMOL/L — SIGNIFICANT CHANGE UP (ref 1.15–1.33)
CALCIUM SERPL-MCNC: 8.8 MG/DL — SIGNIFICANT CHANGE UP (ref 8.5–10.1)
CHLORIDE SERPL-SCNC: 99 MMOL/L — SIGNIFICANT CHANGE UP (ref 98–110)
CO2 SERPL-SCNC: 24 MMOL/L — SIGNIFICANT CHANGE UP (ref 17–32)
CREAT SERPL-MCNC: 0.6 MG/DL — LOW (ref 0.7–1.5)
EGFR: 118 ML/MIN/1.73M2 — SIGNIFICANT CHANGE UP
EOSINOPHIL # BLD AUTO: 0.17 K/UL — SIGNIFICANT CHANGE UP (ref 0–0.7)
EOSINOPHIL NFR BLD AUTO: 2.4 % — SIGNIFICANT CHANGE UP (ref 0–8)
GAS PNL BLDV: 134 MMOL/L — LOW (ref 136–145)
GAS PNL BLDV: SIGNIFICANT CHANGE UP
GIANT PLATELETS BLD QL SMEAR: PRESENT — SIGNIFICANT CHANGE UP
GLUCOSE SERPL-MCNC: 263 MG/DL — HIGH (ref 70–99)
HCG SERPL QL: NEGATIVE — SIGNIFICANT CHANGE UP
HCO3 BLDV-SCNC: 28 MMOL/L — SIGNIFICANT CHANGE UP (ref 22–29)
HCT VFR BLD CALC: 26.9 % — LOW (ref 37–47)
HCT VFR BLDA CALC: 25 % — LOW (ref 39–51)
HGB BLD CALC-MCNC: 8.3 G/DL — LOW (ref 12.6–17.4)
HGB BLD-MCNC: 7.8 G/DL — LOW (ref 12–16)
HYPOCHROMIA BLD QL: SIGNIFICANT CHANGE UP
IMM GRANULOCYTES NFR BLD AUTO: 0.4 % — HIGH (ref 0.1–0.3)
LACTATE BLDV-MCNC: 1.4 MMOL/L — SIGNIFICANT CHANGE UP (ref 0.5–2)
LIDOCAIN IGE QN: 20 U/L — SIGNIFICANT CHANGE UP (ref 7–60)
LYMPHOCYTES # BLD AUTO: 2.94 K/UL — SIGNIFICANT CHANGE UP (ref 1.2–3.4)
LYMPHOCYTES # BLD AUTO: 41.4 % — SIGNIFICANT CHANGE UP (ref 20.5–51.1)
MAGNESIUM SERPL-MCNC: 1.9 MG/DL — SIGNIFICANT CHANGE UP (ref 1.8–2.4)
MCHC RBC-ENTMCNC: 20.3 PG — LOW (ref 27–31)
MCHC RBC-ENTMCNC: 29 G/DL — LOW (ref 32–37)
MCV RBC AUTO: 70.1 FL — LOW (ref 81–99)
MICROCYTES BLD QL: SIGNIFICANT CHANGE UP
MONOCYTES # BLD AUTO: 0.53 K/UL — SIGNIFICANT CHANGE UP (ref 0.1–0.6)
MONOCYTES NFR BLD AUTO: 7.5 % — SIGNIFICANT CHANGE UP (ref 1.7–9.3)
NEUTROPHILS # BLD AUTO: 3.37 K/UL — SIGNIFICANT CHANGE UP (ref 1.4–6.5)
NEUTROPHILS NFR BLD AUTO: 47.5 % — SIGNIFICANT CHANGE UP (ref 42.2–75.2)
NRBC # BLD: 0 /100 WBCS — SIGNIFICANT CHANGE UP (ref 0–0)
OVALOCYTES BLD QL SMEAR: SLIGHT — SIGNIFICANT CHANGE UP
PCO2 BLDV: 49 MMHG — HIGH (ref 39–42)
PH BLDV: 7.36 — SIGNIFICANT CHANGE UP (ref 7.32–7.43)
PLAT MORPH BLD: NORMAL — SIGNIFICANT CHANGE UP
PLATELET # BLD AUTO: 579 K/UL — HIGH (ref 130–400)
PLATELET COUNT - ESTIMATE: ABNORMAL
PO2 BLDV: 31 MMHG — SIGNIFICANT CHANGE UP
POIKILOCYTOSIS BLD QL AUTO: SLIGHT — SIGNIFICANT CHANGE UP
POLYCHROMASIA BLD QL SMEAR: SLIGHT — SIGNIFICANT CHANGE UP
POTASSIUM BLDV-SCNC: 4.3 MMOL/L — SIGNIFICANT CHANGE UP (ref 3.5–5.1)
POTASSIUM SERPL-MCNC: 4.5 MMOL/L — SIGNIFICANT CHANGE UP (ref 3.5–5)
POTASSIUM SERPL-SCNC: 4.5 MMOL/L — SIGNIFICANT CHANGE UP (ref 3.5–5)
PROT SERPL-MCNC: 7 G/DL — SIGNIFICANT CHANGE UP (ref 6–8)
RBC # BLD: 3.84 M/UL — LOW (ref 4.2–5.4)
RBC # FLD: 18.3 % — HIGH (ref 11.5–14.5)
RBC BLD AUTO: ABNORMAL
SAO2 % BLDV: 50.9 % — SIGNIFICANT CHANGE UP
SARS-COV-2 RNA SPEC QL NAA+PROBE: SIGNIFICANT CHANGE UP
SODIUM SERPL-SCNC: 132 MMOL/L — LOW (ref 135–146)
TROPONIN T SERPL-MCNC: <0.01 NG/ML — SIGNIFICANT CHANGE UP
WBC # BLD: 7.1 K/UL — SIGNIFICANT CHANGE UP (ref 4.8–10.8)
WBC # FLD AUTO: 7.1 K/UL — SIGNIFICANT CHANGE UP (ref 4.8–10.8)

## 2022-06-16 PROCEDURE — 93010 ELECTROCARDIOGRAM REPORT: CPT

## 2022-06-16 PROCEDURE — 99285 EMERGENCY DEPT VISIT HI MDM: CPT

## 2022-06-16 PROCEDURE — 71045 X-RAY EXAM CHEST 1 VIEW: CPT | Mod: 26

## 2022-06-16 RX ORDER — SODIUM CHLORIDE 9 MG/ML
1000 INJECTION, SOLUTION INTRAVENOUS ONCE
Refills: 0 | Status: COMPLETED | OUTPATIENT
Start: 2022-06-16 | End: 2022-06-16

## 2022-06-16 RX ORDER — KETOROLAC TROMETHAMINE 30 MG/ML
15 SYRINGE (ML) INJECTION ONCE
Refills: 0 | Status: DISCONTINUED | OUTPATIENT
Start: 2022-06-16 | End: 2022-06-16

## 2022-06-16 RX ADMIN — Medication 15 MILLIGRAM(S): at 22:32

## 2022-06-16 RX ADMIN — Medication 20 MILLIGRAM(S): at 22:31

## 2022-06-16 RX ADMIN — SODIUM CHLORIDE 1000 MILLILITER(S): 9 INJECTION, SOLUTION INTRAVENOUS at 22:32

## 2022-06-16 NOTE — ED ADULT TRIAGE NOTE - BEFAST SPEECH PHRASE
86 y/o female with PMHx Seizure disorder, HTN, CVA, GI bleed, CHF, UTIs, HLD, syncope, pacemaker,  shunt, dementia, presents to the ED from [facility] rehab with nausea, vomiting, weakness, and dizziness for 1-2 weeks. Her urine was checked today and she had a positive UTI, has not received antibiotics yet. Patient unable to give good story, AOx1. Family notes patient hasn't been eating well for 1-2 weeks, vomited twice today, and has been complaining of intermittent nausea and dizziness for 1-2 weeks  CT scan performed and showed Small bowel obstruction with a transition to collapsed loops in a right   inguinal hernia. Small volume interloop edema. Right-sided double-J ureteral stent with mild to moderate right   hydroureteronephrosis. This is not significantly changed from the prior study dated June 6, 2017.    Pt admitted ot surgical service. NGT placed for decompression. Cultures obtained and pt started on antibiotics.     Pt taken to OR emergently for exploratory laparotomy, R inguinal hernia repair, and small bowel resection. Pt tolerated procedure well. Pt transferred to SICU  post op intubated.     Pt underwent spontaneous breathing trial and was successfully extubated.    When hemodynamically stable pt transferred ot general surgical floor.    Cultures finalized as ecoli bacteremia. ID consulted and recommend pt be discharged on .....    Pt's NGT removed and diet slowly advanced as tolerated when return bowel function    Pt with EKG changes for which Cards consulted and recommend .......    PT consulted and recommend pt be discharged to rehab.    Per Attending pt now stable for discharge to rehab. Pt tolerating diet, pain well controlled, and +bowel function. Pt to follow up with Dr Montgomery as an outpatient, instructed to call to schedule appointment Yes 86 y/o female with PMHx Seizure disorder, HTN, CVA, GI bleed, CHF, UTIs, HLD, syncope, pacemaker,  shunt, dementia, presents to the ED from [facility] rehab with nausea, vomiting, weakness, and dizziness for 1-2 weeks. Her urine was checked today and she had a positive UTI, has not received antibiotics yet. Patient unable to give good story, AOx1. Family notes patient hasn't been eating well for 1-2 weeks, vomited twice today, and has been complaining of intermittent nausea and dizziness for 1-2 weeks  CT scan performed and showed Small bowel obstruction with a transition to collapsed loops in a right   inguinal hernia. Small volume interloop edema. Right-sided double-J ureteral stent with mild to moderate right   hydroureteronephrosis. This is not significantly changed from the prior study dated June 6, 2017.    Pt admitted ot surgical service. NGT placed for decompression. Cultures obtained and pt started on antibiotics.     Pt taken to OR emergently for exploratory laparotomy, R inguinal hernia repair, and small bowel resection. Pt tolerated procedure well. Pt transferred to SICU  post op intubated.     Pt underwent spontaneous breathing trial and was successfully extubated.    When hemodynamically stable pt transferred ot general surgical floor.    Cultures finalized as ecoli bacteremia. ID consulted and recommend pt complete antibiotic course as an outpatient with 6 more days of bactrim DS.    Pt's NGT removed and diet slowly advanced as tolerated when return bowel function    Pt with EKG changes for which Cards consulted.  Pt clinically stable; no current cardiovascular complaints BPs better controlled on oral regimen which has been resumed and no further cardiac testing needed at this time.    PT consulted and recommend pt be discharged to rehab.    Per Attending pt now stable for discharge to rehab. Pt tolerating diet, pain well controlled, and +bowel function. Pt to follow up with Dr Montgomery as an outpatient, instructed to call to schedule appointment 86 y/o female with PMHx Seizure disorder, HTN, CVA, GI bleed, CHF, UTIs, HLD, syncope, pacemaker,  shunt, dementia, presents to the ED from [facility] rehab with nausea, vomiting, weakness, and dizziness for 1-2 weeks. Her urine was checked today and she had a positive UTI, has not received antibiotics yet. Patient unable to give good story, AOx1. Family notes patient hasn't been eating well for 1-2 weeks, vomited twice today, and has been complaining of intermittent nausea and dizziness for 1-2 weeks  CT scan performed and showed Small bowel obstruction with a transition to collapsed loops in a right   inguinal hernia. Small volume interloop edema. Right-sided double-J ureteral stent with mild to moderate right   hydroureteronephrosis. This is not significantly changed from the prior study dated June 6, 2017.    Pt admitted ot surgical service. NGT placed for decompression. Cultures obtained and pt started on antibiotics.     Pt taken to OR emergently for exploratory laparotomy, R inguinal hernia repair, and small bowel resection. Pt tolerated procedure well. Pt transferred to SICU  post op intubated.     Pt underwent spontaneous breathing trial and was successfully extubated.    When hemodynamically stable pt transferred ot general surgical floor.    Cultures finalized as ecoli bacteremia. ID consulted and recommend pt complete antibiotic course as an outpatient with 6 more days of bactrim DS.    Pt's NGT removed and diet slowly advanced as tolerated when return bowel function    Pt with EKG changes for which Cards consulted.  Pt clinically stable; no current cardiovascular complaints BPs better controlled on oral regimen which has been resumed and no further cardiac testing needed at this time.    Pt failed TOV and hua catheter replaced, will follow up as an outpatient for removal at [facility].    PT consulted and recommend pt be discharged to rehab with hua catheter.    Per Attending pt now stable for discharge to rehab. Pt tolerating diet, pain well controlled, and +bowel function. Pt to follow up with Dr Montgomery as an outpatient, instructed to call to schedule appointment 86 y/o female with PMHx Seizure disorder, HTN, CVA, GI bleed, CHF, UTIs, HLD, syncope, pacemaker,  shunt, dementia, presents to the ED from [facility] rehab with nausea, vomiting, weakness, and dizziness for 1-2 weeks. Her urine was checked today and she had a positive UTI, has not received antibiotics yet. Patient unable to give good story, AOx1. Family notes patient hasn't been eating well for 1-2 weeks, vomited twice today, and has been complaining of intermittent nausea and dizziness for 1-2 weeks  CT scan performed and showed Small bowel obstruction with a transition to collapsed loops in a right   inguinal hernia. Small volume interloop edema. Right-sided double-J ureteral stent with mild to moderate right   hydroureteronephrosis. This is not significantly changed from the prior study dated June 6, 2017.    Pt admitted ot surgical service. NGT placed for decompression. Cultures obtained and pt started on antibiotics.     Pt taken to OR emergently for exploratory laparotomy, R inguinal hernia repair, and small bowel resection. Pt tolerated procedure well. Pt transferred to SICU  post op intubated.     Pt underwent spontaneous breathing trial and was successfully extubated.    When hemodynamically stable pt transferred to general surgical floor.    Cultures finalized as ecoli bacteremia. ID consulted and recommend pt complete antibiotic course as an outpatient with 2 additional days of bactrim double strength, concluding on 7/11/2018.    Pt's NGT removed and diet slowly advanced as tolerated when return bowel function    Pt with EKG changes for which Cards consulted.  Pt clinically stable; no current cardiovascular complaints BPs better controlled on oral regimen which has been resumed and no further cardiac testing needed at this time.    Pt failed TOV and hua catheter replaced, will follow up as an outpatient for removal at [facility].    PT consulted and recommend pt be discharged to rehab with hua catheter.    Per Attending pt now stable for discharge to rehab. Pt tolerating diet, pain well controlled, and +bowel function. Pt to follow up with Dr Montgomery as an outpatient, instructed to call to schedule appointment

## 2022-06-17 ENCOUNTER — APPOINTMENT (OUTPATIENT)
Dept: PSYCHIATRY | Facility: CLINIC | Age: 37
End: 2022-06-17

## 2022-06-17 ENCOUNTER — NON-APPOINTMENT (OUTPATIENT)
Age: 37
End: 2022-06-17

## 2022-06-17 ENCOUNTER — EMERGENCY (EMERGENCY)
Facility: HOSPITAL | Age: 37
LOS: 0 days | Discharge: HOME | End: 2022-06-17
Attending: EMERGENCY MEDICINE | Admitting: EMERGENCY MEDICINE
Payer: MEDICAID

## 2022-06-17 VITALS
RESPIRATION RATE: 18 BRPM | TEMPERATURE: 97 F | OXYGEN SATURATION: 98 % | SYSTOLIC BLOOD PRESSURE: 111 MMHG | DIASTOLIC BLOOD PRESSURE: 56 MMHG | HEART RATE: 85 BPM

## 2022-06-17 VITALS
SYSTOLIC BLOOD PRESSURE: 118 MMHG | DIASTOLIC BLOOD PRESSURE: 66 MMHG | HEIGHT: 65 IN | RESPIRATION RATE: 20 BRPM | OXYGEN SATURATION: 100 % | HEART RATE: 89 BPM | TEMPERATURE: 98 F

## 2022-06-17 DIAGNOSIS — Z96.41 PRESENCE OF INSULIN PUMP (EXTERNAL) (INTERNAL): ICD-10-CM

## 2022-06-17 DIAGNOSIS — Z79.4 LONG TERM (CURRENT) USE OF INSULIN: ICD-10-CM

## 2022-06-17 DIAGNOSIS — F41.9 ANXIETY DISORDER, UNSPECIFIED: ICD-10-CM

## 2022-06-17 DIAGNOSIS — R51.9 HEADACHE, UNSPECIFIED: ICD-10-CM

## 2022-06-17 DIAGNOSIS — K21.9 GASTRO-ESOPHAGEAL REFLUX DISEASE WITHOUT ESOPHAGITIS: ICD-10-CM

## 2022-06-17 DIAGNOSIS — R10.30 LOWER ABDOMINAL PAIN, UNSPECIFIED: ICD-10-CM

## 2022-06-17 DIAGNOSIS — Z87.440 PERSONAL HISTORY OF URINARY (TRACT) INFECTIONS: ICD-10-CM

## 2022-06-17 DIAGNOSIS — E10.40 TYPE 1 DIABETES MELLITUS WITH DIABETIC NEUROPATHY, UNSPECIFIED: ICD-10-CM

## 2022-06-17 DIAGNOSIS — G89.29 OTHER CHRONIC PAIN: ICD-10-CM

## 2022-06-17 DIAGNOSIS — Z88.1 ALLERGY STATUS TO OTHER ANTIBIOTIC AGENTS STATUS: ICD-10-CM

## 2022-06-17 DIAGNOSIS — Z79.82 LONG TERM (CURRENT) USE OF ASPIRIN: ICD-10-CM

## 2022-06-17 DIAGNOSIS — R42 DIZZINESS AND GIDDINESS: ICD-10-CM

## 2022-06-17 DIAGNOSIS — Z88.7 ALLERGY STATUS TO SERUM AND VACCINE: ICD-10-CM

## 2022-06-17 DIAGNOSIS — F32.9 MAJOR DEPRESSIVE DISORDER, SINGLE EPISODE, UNSPECIFIED: ICD-10-CM

## 2022-06-17 DIAGNOSIS — Z88.0 ALLERGY STATUS TO PENICILLIN: ICD-10-CM

## 2022-06-17 LAB
ALBUMIN SERPL ELPH-MCNC: 4.5 G/DL — SIGNIFICANT CHANGE UP (ref 3.5–5.2)
ALP SERPL-CCNC: 121 U/L — HIGH (ref 30–115)
ALT FLD-CCNC: 26 U/L — SIGNIFICANT CHANGE UP (ref 0–41)
ANION GAP SERPL CALC-SCNC: 11 MMOL/L — SIGNIFICANT CHANGE UP (ref 7–14)
ANISOCYTOSIS BLD QL: SIGNIFICANT CHANGE UP
APPEARANCE UR: ABNORMAL
AST SERPL-CCNC: 18 U/L — SIGNIFICANT CHANGE UP (ref 0–41)
BASOPHILS # BLD AUTO: 0 K/UL — SIGNIFICANT CHANGE UP (ref 0–0.2)
BASOPHILS NFR BLD AUTO: 0 % — SIGNIFICANT CHANGE UP (ref 0–1)
BILIRUB DIRECT SERPL-MCNC: <0.2 MG/DL — SIGNIFICANT CHANGE UP (ref 0–0.3)
BILIRUB INDIRECT FLD-MCNC: SIGNIFICANT CHANGE UP MG/DL (ref 0.2–1.2)
BILIRUB SERPL-MCNC: <0.2 MG/DL — SIGNIFICANT CHANGE UP (ref 0.2–1.2)
BILIRUB UR-MCNC: NEGATIVE — SIGNIFICANT CHANGE UP
BUN SERPL-MCNC: 11 MG/DL — SIGNIFICANT CHANGE UP (ref 10–20)
CALCIUM SERPL-MCNC: 9.3 MG/DL — SIGNIFICANT CHANGE UP (ref 8.5–10.1)
CHLORIDE SERPL-SCNC: 99 MMOL/L — SIGNIFICANT CHANGE UP (ref 98–110)
CO2 SERPL-SCNC: 22 MMOL/L — SIGNIFICANT CHANGE UP (ref 17–32)
COLOR SPEC: YELLOW — SIGNIFICANT CHANGE UP
CREAT SERPL-MCNC: 0.6 MG/DL — LOW (ref 0.7–1.5)
DIFF PNL FLD: ABNORMAL
EGFR: 118 ML/MIN/1.73M2 — SIGNIFICANT CHANGE UP
EOSINOPHIL # BLD AUTO: 0.08 K/UL — SIGNIFICANT CHANGE UP (ref 0–0.7)
EOSINOPHIL NFR BLD AUTO: 0.9 % — SIGNIFICANT CHANGE UP (ref 0–8)
EPI CELLS # UR: ABNORMAL /HPF
GIANT PLATELETS BLD QL SMEAR: PRESENT — SIGNIFICANT CHANGE UP
GLUCOSE SERPL-MCNC: 222 MG/DL — HIGH (ref 70–99)
GLUCOSE UR QL: 500 MG/DL
HCG SERPL QL: NEGATIVE — SIGNIFICANT CHANGE UP
HCT VFR BLD CALC: 28.9 % — LOW (ref 37–47)
HGB BLD-MCNC: 8.7 G/DL — LOW (ref 12–16)
HYPOCHROMIA BLD QL: SIGNIFICANT CHANGE UP
KETONES UR-MCNC: NEGATIVE — SIGNIFICANT CHANGE UP
LACTATE SERPL-SCNC: 1.2 MMOL/L — SIGNIFICANT CHANGE UP (ref 0.7–2)
LEUKOCYTE ESTERASE UR-ACNC: NEGATIVE — SIGNIFICANT CHANGE UP
LIDOCAIN IGE QN: 18 U/L — SIGNIFICANT CHANGE UP (ref 7–60)
LYMPHOCYTES # BLD AUTO: 2.07 K/UL — SIGNIFICANT CHANGE UP (ref 1.2–3.4)
LYMPHOCYTES # BLD AUTO: 23.7 % — SIGNIFICANT CHANGE UP (ref 20.5–51.1)
MANUAL SMEAR VERIFICATION: SIGNIFICANT CHANGE UP
MCHC RBC-ENTMCNC: 20.7 PG — LOW (ref 27–31)
MCHC RBC-ENTMCNC: 30.1 G/DL — LOW (ref 32–37)
MCV RBC AUTO: 68.8 FL — LOW (ref 81–99)
MICROCYTES BLD QL: SIGNIFICANT CHANGE UP
MONOCYTES # BLD AUTO: 0.31 K/UL — SIGNIFICANT CHANGE UP (ref 0.1–0.6)
MONOCYTES NFR BLD AUTO: 3.5 % — SIGNIFICANT CHANGE UP (ref 1.7–9.3)
NEUTROPHILS # BLD AUTO: 6.29 K/UL — SIGNIFICANT CHANGE UP (ref 1.4–6.5)
NEUTROPHILS NFR BLD AUTO: 71.9 % — SIGNIFICANT CHANGE UP (ref 42.2–75.2)
NITRITE UR-MCNC: NEGATIVE — SIGNIFICANT CHANGE UP
OVALOCYTES BLD QL SMEAR: SLIGHT — SIGNIFICANT CHANGE UP
PH UR: 6 — SIGNIFICANT CHANGE UP (ref 5–8)
PLAT MORPH BLD: NORMAL — SIGNIFICANT CHANGE UP
PLATELET # BLD AUTO: 621 K/UL — HIGH (ref 130–400)
POLYCHROMASIA BLD QL SMEAR: SIGNIFICANT CHANGE UP
POTASSIUM SERPL-MCNC: 4.6 MMOL/L — SIGNIFICANT CHANGE UP (ref 3.5–5)
POTASSIUM SERPL-SCNC: 4.6 MMOL/L — SIGNIFICANT CHANGE UP (ref 3.5–5)
PROT SERPL-MCNC: 7.3 G/DL — SIGNIFICANT CHANGE UP (ref 6–8)
PROT UR-MCNC: NEGATIVE MG/DL — SIGNIFICANT CHANGE UP
RBC # BLD: 4.2 M/UL — SIGNIFICANT CHANGE UP (ref 4.2–5.4)
RBC # FLD: 18.3 % — HIGH (ref 11.5–14.5)
RBC BLD AUTO: ABNORMAL
RBC CASTS # UR COMP ASSIST: >50 /HPF
SCHISTOCYTES BLD QL AUTO: SLIGHT — SIGNIFICANT CHANGE UP
SODIUM SERPL-SCNC: 132 MMOL/L — LOW (ref 135–146)
SP GR SPEC: >=1.03 (ref 1.01–1.03)
TROPONIN T SERPL-MCNC: <0.01 NG/ML — SIGNIFICANT CHANGE UP
UROBILINOGEN FLD QL: 0.2 MG/DL — SIGNIFICANT CHANGE UP
WBC # BLD: 8.75 K/UL — SIGNIFICANT CHANGE UP (ref 4.8–10.8)
WBC # FLD AUTO: 8.75 K/UL — SIGNIFICANT CHANGE UP (ref 4.8–10.8)

## 2022-06-17 PROCEDURE — 74177 CT ABD & PELVIS W/CONTRAST: CPT | Mod: 26,MG

## 2022-06-17 PROCEDURE — G1004: CPT

## 2022-06-17 PROCEDURE — 71045 X-RAY EXAM CHEST 1 VIEW: CPT | Mod: 26

## 2022-06-17 PROCEDURE — 99285 EMERGENCY DEPT VISIT HI MDM: CPT

## 2022-06-17 PROCEDURE — 93010 ELECTROCARDIOGRAM REPORT: CPT

## 2022-06-17 RX ORDER — MECLIZINE HCL 12.5 MG
25 TABLET ORAL ONCE
Refills: 0 | Status: COMPLETED | OUTPATIENT
Start: 2022-06-17 | End: 2022-06-17

## 2022-06-17 RX ORDER — SODIUM CHLORIDE 9 MG/ML
1000 INJECTION INTRAMUSCULAR; INTRAVENOUS; SUBCUTANEOUS ONCE
Refills: 0 | Status: COMPLETED | OUTPATIENT
Start: 2022-06-17 | End: 2022-06-17

## 2022-06-17 RX ORDER — ACETAMINOPHEN 500 MG
975 TABLET ORAL ONCE
Refills: 0 | Status: COMPLETED | OUTPATIENT
Start: 2022-06-17 | End: 2022-06-17

## 2022-06-17 RX ORDER — MECLIZINE HCL 12.5 MG
1 TABLET ORAL
Qty: 28 | Refills: 0
Start: 2022-06-17 | End: 2022-06-23

## 2022-06-17 RX ADMIN — SODIUM CHLORIDE 1000 MILLILITER(S): 9 INJECTION INTRAMUSCULAR; INTRAVENOUS; SUBCUTANEOUS at 15:00

## 2022-06-17 RX ADMIN — SODIUM CHLORIDE 1000 MILLILITER(S): 9 INJECTION INTRAMUSCULAR; INTRAVENOUS; SUBCUTANEOUS at 17:17

## 2022-06-17 RX ADMIN — Medication 25 MILLIGRAM(S): at 17:28

## 2022-06-17 RX ADMIN — Medication 975 MILLIGRAM(S): at 17:28

## 2022-06-17 NOTE — ED PROVIDER NOTE - PATIENT PORTAL LINK FT
You can access the FollowMyHealth Patient Portal offered by Health system by registering at the following website: [email or web address]. By joining [employer]’s FollowMyHealth portal, you will also be able to view your health information using other applications (apps) compatible with our system.

## 2022-06-17 NOTE — ED PROVIDER NOTE - PATIENT PORTAL LINK FT
You can access the FollowMyHealth Patient Portal offered by [facility] by registering at the following website: [email or web address]. By joining [employer]’s FollowMyHealth portal, you will also be able to view your health information using other applications (apps) compatible with our system.

## 2022-06-17 NOTE — ED PROVIDER NOTE - CARE PROVIDER_API CALL
Lyndsey Arellano (MD)  Gastroenterology  [address] [place] [address]  Phone: [phone]  Fax: [phone]  Follow Up Time:

## 2022-06-17 NOTE — ED ADULT NURSE NOTE - NSIMPLEMENTINTERV_GEN_ALL_ED
Implemented All Universal Safety Interventions:  [place] to call system. Call bell, personal items and telephone within reach. Instruct patient to call for assistance. Room bathroom lighting operational. Non-slip footwear when patient is off stretcher. Physically safe environment: no spills, clutter or unnecessary equipment. Stretcher in lowest position, wheels locked, appropriate side rails in place. Cold/Sinus

## 2022-06-17 NOTE — ED PROVIDER NOTE - OBJECTIVE STATEMENT
Patient is a 37-year-old female with a past medical history of pseudoseizures, anxiety, depression, diabetes here for evaluation of lower abdominal pain for 1 year, dizziness, headache.  Patient had appointment to see her psychiatrist this morning but had a pseudoseizure which prompted visit to the ED.  Patient has been evaluated multiple times in the past for same complaints.

## 2022-06-17 NOTE — ED PROVIDER NOTE - OBJECTIVE STATEMENT
37y F pmh DM, GERD, MDD, Spinal Stenosis presents for eval of abd pain. Pt has intermittent aching generalized abd pain x1yr, no inciting or relieving factors. Recently evaluated by GI with no intervention. Endorses fatigue.

## 2022-06-17 NOTE — ED ADULT NURSE NOTE - NSIMPLEMENTINTERV_GEN_ALL_ED
Implemented All Fall with Harm Risk Interventions:  [place] to call system. Call bell, personal items and telephone within reach. Instruct patient to call for assistance. Room bathroom lighting operational. Non-slip footwear when patient is off stretcher. Physically safe environment: no spills, clutter or unnecessary equipment. Stretcher in lowest position, wheels locked, appropriate side rails in place. Provide visual cue, wrist band, yellow gown, etc. Monitor gait and stability. Monitor for mental status changes and reorient to person, place, and time. Review medications for side effects contributing to fall risk. Reinforce activity limits and safety measures with patient and family. Provide visual clues: red socks.

## 2022-06-17 NOTE — ED PROVIDER NOTE - ATTENDING APP SHARED VISIT CONTRIBUTION OF CARE
Patient is a 37-year-old female presents emergency department planing of epigastric abdominal pain burning in nature mild no fevers no chills no vomiting no diarrhea onset over the past several months however patient had worsening tonight    On physical exam patient is normocephalic atraumatic pupils equal round react light accommodation extraocular muscles intact oropharynx clear chest clear to auscultation bilaterally abdomen soft nontender nondistended no guarding no rebound no CVA tenderness radial pulses 2+ pedal pulses 2+ no edema patient is moving all extremities well    Assessment plan patient complains of epigastric abdominal pain is chronic in nature however worsening tonight no fevers no vomiting vital signs are normal her abdominal exam reveals no guarding or rebound no CVA tenderness routine labs which are normal patient actually improved here in the emergency department currently asymptomatic tolerating p.o. she will ambulate well I reviewed prior charts patient had a negative CT abdomen pelvis on June 9 given her improvement and ability to tolerate p.o. unlikely to be related to ovarian torsion she is afebrile with normal urine no evidence of urinary tract infection at this time patient is a resident at [facility] will discharge to her facility instructed to return for further evaluation Patient is a 37-year-old female presents emergency department planing of epigastric abdominal pain burning in nature mild no fevers no chills no vomiting no diarrhea onset over the past several months however patient had worsening tonight    On physical exam patient is normocephalic atraumatic pupils equal round react light accommodation extraocular muscles intact oropharynx clear chest clear to auscultation bilaterally abdomen soft nontender nondistended no guarding no rebound no CVA tenderness radial pulses 2+ pedal pulses 2+ no edema patient is moving all extremities well    Assessment plan patient complains of epigastric abdominal pain is chronic in nature however worsening tonight no fevers no vomiting vital signs are normal her abdominal exam reveals no guarding or rebound no CVA tenderness routine labs which are normal patient actually improved here in the emergency department currently asymptomatic tolerating p.o. she will ambulate well I reviewed prior charts patient had a negative CT abdomen pelvis on June 9 given her improvement and ability to tolerate p.o. unlikely to be related to ovarian torsion she is afebrile with normal urine no evidence of urinary tract infection at this time patient is a resident at [facility] will discharge to her facility instructed to return for further evaluation. V-Y Plasty Text: The defect edges were debeveled with a #15 scalpel blade.  Given the location of the defect, shape of the defect and the proximity to free margins an V-Y advancement flap was deemed most appropriate.  Using a sterile surgical marker, an appropriate advancement flap was drawn incorporating the defect and placing the expected incisions within the relaxed skin tension lines where possible.    The area thus outlined was incised deep to adipose tissue with a #15 scalpel blade.  The skin margins were undermined to an appropriate distance in all directions utilizing iris scissors.

## 2022-06-17 NOTE — ED PROVIDER NOTE - ATTENDING APP SHARED VISIT CONTRIBUTION OF CARE
37-year-old female history of depression, neuropathy, GERD, now with chronic abdominal pain for about a year.  Worse today.  No fever.  No vomiting.    Exam as noted.  Nontoxic.  Abdomen soft.  No focal neurodeficits.  Lungs clear.

## 2022-06-17 NOTE — ED PROVIDER NOTE - NS ED ROS FT
Eyes:  No visual changes, eye pain or discharge.  ENMT:  No hearing changes, pain, discharge or infections. No neck pain or stiffness.  Cardiac:  No chest pain, SOB or edema. No chest pain with exertion.  Respiratory:  No cough or respiratory distress. No hemoptysis. No history of asthma or RAD.  GI:  +abd pain No nausea, vomiting, diarrhea   :  No dysuria, frequency or burning.  MS:  No myalgia, muscle weakness, joint pain or back pain.  Neuro:  No headache or weakness.  No LOC.  Skin:  No skin rash.   Endocrine: + DM No history of thyroid disease  Except as documented in the HPI,  all other systems are negative.

## 2022-06-17 NOTE — ED PROVIDER NOTE - CLINICAL SUMMARY MEDICAL DECISION MAKING FREE TEXT BOX
patient complains of epigastric abdominal pain is chronic in nature however worsening tonight no fevers no vomiting vital signs are normal her abdominal exam reveals no guarding or rebound no CVA tenderness routine labs which are normal patient actually improved here in the emergency department currently asymptomatic tolerating p.o. she will ambulate well I reviewed prior charts patient had a negative CT abdomen pelvis on June 9 given her improvement and ability to tolerate p.o. unlikely to be related to ovarian torsion she is afebrile with normal urine no evidence of urinary tract infection at this time patient is a resident at [facility] will discharge to her facility instructed to return for further evaluation

## 2022-06-17 NOTE — ED ADULT TRIAGE NOTE - CHIEF COMPLAINT QUOTE
s/p weakness and fall, patient states her legs felt weak and she fell, +dizziness, denies hitting her head, denies injury, denies anticoagulation Hx tremors and anxiety

## 2022-06-18 LAB
CULTURE RESULTS: SIGNIFICANT CHANGE UP
SPECIMEN SOURCE: SIGNIFICANT CHANGE UP

## 2022-06-21 ENCOUNTER — RX RENEWAL (OUTPATIENT)
Age: 37
End: 2022-06-21

## 2022-06-28 NOTE — ED ADULT TRIAGE NOTE - HEART RATE (BEATS/MIN)
----- Message from Parish Joshua sent at 6/28/2022  2:17 PM CDT -----  Regarding: appt  Type:  Sooner Apoointment Request    Caller is requesting a sooner appointment.  Caller declined first available appointment listed below.  Caller will not accept being placed on the waitlist and is requesting a message be sent to doctor.  Name of Caller:patient     When is the first available appointment?n/a    Symptoms:annual     Would the patient rather a call back or a response via [employer]? Call back     Best Call Back Number:[phone]  Additional Information: n/a         109

## 2022-06-30 ENCOUNTER — APPOINTMENT (OUTPATIENT)
Dept: OBGYN | Facility: CLINIC | Age: 37
End: 2022-06-30

## 2022-06-30 VITALS — WEIGHT: 165 LBS | DIASTOLIC BLOOD PRESSURE: 78 MMHG | SYSTOLIC BLOOD PRESSURE: 122 MMHG | BODY MASS INDEX: 28.32 KG/M2

## 2022-06-30 DIAGNOSIS — N76.2 ACUTE VULVITIS: ICD-10-CM

## 2022-06-30 DIAGNOSIS — Z87.42 PERSONAL HISTORY OF OTHER DISEASES OF THE FEMALE GENITAL TRACT: ICD-10-CM

## 2022-06-30 PROCEDURE — 81002 URINALYSIS NONAUTO W/O SCOPE: CPT

## 2022-06-30 PROCEDURE — 99214 OFFICE O/P EST MOD 30 MIN: CPT

## 2022-06-30 NOTE — DISCUSSION/SUMMARY
[FreeTextEntry1] : B VV test done\par Prescribed bilateral screening mammogram and bilateral breast ultrasound\par Keep menstrual calendar\par Follow-up yearly or as needed

## 2022-06-30 NOTE — PHYSICAL EXAM
[Appropriately responsive] : appropriately responsive [Alert] : alert [No Acute Distress] : no acute distress [No Lymphadenopathy] : no lymphadenopathy [Regular Rate Rhythm] : regular rate rhythm [No Murmurs] : no murmurs [Clear to Auscultation B/L] : clear to auscultation bilaterally [Soft] : soft [Non-tender] : non-tender [Non-distended] : non-distended [No HSM] : No HSM [No Lesions] : no lesions [No Mass] : no mass [Oriented x3] : oriented x3 [Labia Majora] : normal [Labia Minora] : normal [Discharge] : a  ~M vaginal discharge was present [Normal] : normal [Uterine Adnexae] : normal

## 2022-06-30 NOTE — HISTORY OF PRESENT ILLNESS
[FreeTextEntry1] : Patient is 37 years old para 0-0-0-0 last menstrual period June 18, 2022.\par Patient states that she was recently treated for urinary tract infection.  Urine dip is noted to be within normal limits.\par Patient also complains of vaginal discharge with vulvar irritation.  Patient is adopted and is unaware of her family history.  She is requesting bilateral screening mammogram/bilateral breast ultrasound.

## 2022-07-05 DIAGNOSIS — B96.89 ACUTE VAGINITIS: ICD-10-CM

## 2022-07-05 DIAGNOSIS — N76.0 ACUTE VAGINITIS: ICD-10-CM

## 2022-07-08 ENCOUNTER — APPOINTMENT (OUTPATIENT)
Dept: PSYCHIATRY | Facility: CLINIC | Age: 37
End: 2022-07-08

## 2022-07-08 ENCOUNTER — NON-APPOINTMENT (OUTPATIENT)
Age: 37
End: 2022-07-08

## 2022-07-08 ENCOUNTER — OUTPATIENT (OUTPATIENT)
Dept: OUTPATIENT SERVICES | Facility: HOSPITAL | Age: 37
LOS: 1 days | Discharge: HOME | End: 2022-07-08

## 2022-07-08 DIAGNOSIS — F41.1 GENERALIZED ANXIETY DISORDER: ICD-10-CM

## 2022-07-08 PROCEDURE — 99214 OFFICE O/P EST MOD 30 MIN: CPT

## 2022-07-13 NOTE — ED ADULT NURSE NOTE - CCCP TRG CHIEF CMPLNT
Informed wife of diflucan and she was very thankful      Will monitor for completed UC abdominal pain

## 2022-07-22 ENCOUNTER — OUTPATIENT (OUTPATIENT)
Dept: OUTPATIENT SERVICES | Facility: HOSPITAL | Age: 37
LOS: 1 days | Discharge: HOME | End: 2022-07-22

## 2022-07-22 ENCOUNTER — APPOINTMENT (OUTPATIENT)
Dept: PSYCHIATRY | Facility: CLINIC | Age: 37
End: 2022-07-22

## 2022-07-22 DIAGNOSIS — F41.1 GENERALIZED ANXIETY DISORDER: ICD-10-CM

## 2022-07-25 ENCOUNTER — APPOINTMENT (OUTPATIENT)
Dept: ENDOCRINOLOGY | Facility: CLINIC | Age: 37
End: 2022-07-25

## 2022-07-25 VITALS
OXYGEN SATURATION: 98 % | SYSTOLIC BLOOD PRESSURE: 150 MMHG | HEART RATE: 92 BPM | HEIGHT: 64 IN | WEIGHT: 165 LBS | TEMPERATURE: 97.4 F | BODY MASS INDEX: 28.17 KG/M2 | DIASTOLIC BLOOD PRESSURE: 80 MMHG

## 2022-07-25 PROCEDURE — 99214 OFFICE O/P EST MOD 30 MIN: CPT

## 2022-07-26 NOTE — ASSESSMENT
[Diabetes Foot Care] : diabetes foot care [Long Term Vascular Complications] : long term vascular complications of diabetes [Carbohydrate Consistent Diet] : carbohydrate consistent diet [Importance of Diet and Exercise] : importance of diet and exercise to improve glycemic control, achieve weight loss and improve cardiovascular health [Exercise/Effect on Glucose] : exercise/effect on glucose [Hypoglycemia Management] : hypoglycemia management [Action and use of Insulin] : action and use of short and long-acting insulin [Self Monitoring of Blood Glucose] : self monitoring of blood glucose [Insulin Self-Administration] : insulin self-administration [Injection Technique, Storage, Sharps Disposal] : injection technique, storage, and sharps disposal [FreeTextEntry1] : 37 year old patient with type 1 DM on insulin pump presents for  follow up \par \par \par #poorly controlled type 1 DM  on insulin pump Medtronic 630 G \par - log reviewed , pattern of hyperglycemia , when eating noted to maintain same numbers indicating good I:C ratio but given amount of insulin she is using TOTAL of aorund 60- 70 units will lower her sensitivity to 1:75 and if reamin hgih she will change it to 1:60 (   Ania is very worried about hypoglycemia ) \par - basal rate as follow \par 12am-6am : 1.4 from 1.2 \par 6am -12pm : 1 u/hr from 0.8 \par 12pm-12am 1.6 from 1.4 u/hr \par - advised not to correct within 2 hours from taking insulin to avoid Stalking and hypoglycemia \par - uptodate with eye exam , has cataract \par - periods regular, has hirsutism will do PCOS work up again \par - patient not interested in CGM because of inability to be placed on her arms /legs , very happy with her pump \par - Given situation with anxiety advised to closely follow with BT and psychiatrist as patient and mother were very anxious during visit \par

## 2022-07-26 NOTE — REVIEW OF SYSTEMS
[Palpitations] : palpitations [Fatigue] : no fatigue [Decreased Appetite] : appetite not decreased [Recent Weight Gain (___ Lbs)] : no recent weight gain [Recent Weight Loss (___ Lbs)] : no recent weight loss [Visual Field Defect] : no visual field defect [Blurred Vision] : no blurred vision [Dysphagia] : no dysphagia [Dysphonia] : no dysphonia [Shortness Of Breath] : no shortness of breath [Cough] : no cough [Orthopnea] : no orthopnea [PND] : no Paroxysmal Nocturnal Dyspnea [Nausea] : no nausea [Constipation] : no constipation [Vomiting] : no vomiting [Diarrhea] : no diarrhea [Nocturia] : no nocturia [Acanthosis] : no acanthosis  [Acne] : no acne [Dry Skin] : no dry skin [Hirsutism] : no hirsutism [Headaches] : no headaches [Tremors] : no tremors [Depression] : depression [Suicidal Ideation] : no suicidal ideation [Anxiety] : anxiety [Stress] : stress [As Noted in HPI] : as noted in HPI [Cold Intolerance] : no cold intolerance [Heat Intolerance] : no heat intolerance

## 2022-07-28 ENCOUNTER — EMERGENCY (EMERGENCY)
Facility: HOSPITAL | Age: 37
LOS: 0 days | Discharge: NURSING, REHAB OR LONG-TERM CARE | End: 2022-07-28
Attending: EMERGENCY MEDICINE | Admitting: EMERGENCY MEDICINE

## 2022-07-28 VITALS
HEIGHT: 65 IN | WEIGHT: 171.08 LBS | SYSTOLIC BLOOD PRESSURE: 144 MMHG | HEART RATE: 114 BPM | DIASTOLIC BLOOD PRESSURE: 86 MMHG | OXYGEN SATURATION: 98 % | RESPIRATION RATE: 18 BRPM | TEMPERATURE: 98 F

## 2022-07-28 DIAGNOSIS — R07.2 PRECORDIAL PAIN: ICD-10-CM

## 2022-07-28 DIAGNOSIS — K21.9 GASTRO-ESOPHAGEAL REFLUX DISEASE WITHOUT ESOPHAGITIS: ICD-10-CM

## 2022-07-28 DIAGNOSIS — R10.11 RIGHT UPPER QUADRANT PAIN: ICD-10-CM

## 2022-07-28 DIAGNOSIS — R53.1 WEAKNESS: ICD-10-CM

## 2022-07-28 DIAGNOSIS — Z79.82 LONG TERM (CURRENT) USE OF ASPIRIN: ICD-10-CM

## 2022-07-28 DIAGNOSIS — Z88.1 ALLERGY STATUS TO OTHER ANTIBIOTIC AGENTS STATUS: ICD-10-CM

## 2022-07-28 DIAGNOSIS — Z88.0 ALLERGY STATUS TO PENICILLIN: ICD-10-CM

## 2022-07-28 DIAGNOSIS — I10 ESSENTIAL (PRIMARY) HYPERTENSION: ICD-10-CM

## 2022-07-28 DIAGNOSIS — R11.0 NAUSEA: ICD-10-CM

## 2022-07-28 DIAGNOSIS — E10.40 TYPE 1 DIABETES MELLITUS WITH DIABETIC NEUROPATHY, UNSPECIFIED: ICD-10-CM

## 2022-07-28 DIAGNOSIS — Z79.4 LONG TERM (CURRENT) USE OF INSULIN: ICD-10-CM

## 2022-07-28 DIAGNOSIS — F41.9 ANXIETY DISORDER, UNSPECIFIED: ICD-10-CM

## 2022-07-28 DIAGNOSIS — R10.13 EPIGASTRIC PAIN: ICD-10-CM

## 2022-07-28 DIAGNOSIS — Z96.41 PRESENCE OF INSULIN PUMP (EXTERNAL) (INTERNAL): ICD-10-CM

## 2022-07-28 DIAGNOSIS — Z87.39 PERSONAL HISTORY OF OTHER DISEASES OF THE MUSCULOSKELETAL SYSTEM AND CONNECTIVE TISSUE: ICD-10-CM

## 2022-07-28 DIAGNOSIS — Z88.8 ALLERGY STATUS TO OTHER DRUGS, MEDICAMENTS AND BIOLOGICAL SUBSTANCES STATUS: ICD-10-CM

## 2022-07-28 DIAGNOSIS — K58.9 IRRITABLE BOWEL SYNDROME WITHOUT DIARRHEA: ICD-10-CM

## 2022-07-28 DIAGNOSIS — F32.A DEPRESSION, UNSPECIFIED: ICD-10-CM

## 2022-07-28 DIAGNOSIS — Z88.7 ALLERGY STATUS TO SERUM AND VACCINE: ICD-10-CM

## 2022-07-28 DIAGNOSIS — M54.9 DORSALGIA, UNSPECIFIED: ICD-10-CM

## 2022-07-28 DIAGNOSIS — G89.29 OTHER CHRONIC PAIN: ICD-10-CM

## 2022-07-28 DIAGNOSIS — R10.10 UPPER ABDOMINAL PAIN, UNSPECIFIED: ICD-10-CM

## 2022-07-28 DIAGNOSIS — Z20.822 CONTACT WITH AND (SUSPECTED) EXPOSURE TO COVID-19: ICD-10-CM

## 2022-07-28 DIAGNOSIS — R51.9 HEADACHE, UNSPECIFIED: ICD-10-CM

## 2022-07-28 LAB
ALBUMIN SERPL ELPH-MCNC: 4.2 G/DL — SIGNIFICANT CHANGE UP (ref 3.5–5.2)
ALP SERPL-CCNC: 136 U/L — HIGH (ref 30–115)
ALT FLD-CCNC: 52 U/L — HIGH (ref 0–41)
ANION GAP SERPL CALC-SCNC: 11 MMOL/L — SIGNIFICANT CHANGE UP (ref 7–14)
ANISOCYTOSIS BLD QL: SIGNIFICANT CHANGE UP
APPEARANCE UR: CLEAR — SIGNIFICANT CHANGE UP
AST SERPL-CCNC: 48 U/L — HIGH (ref 0–41)
B-OH-BUTYR SERPL-SCNC: <0.2 MMOL/L — SIGNIFICANT CHANGE UP
BACTERIA # UR AUTO: NEGATIVE — SIGNIFICANT CHANGE UP
BASE EXCESS BLDV CALC-SCNC: 1.7 MMOL/L — SIGNIFICANT CHANGE UP (ref -2–3)
BASOPHILS # BLD AUTO: 0.11 K/UL — SIGNIFICANT CHANGE UP (ref 0–0.2)
BASOPHILS NFR BLD AUTO: 0.9 % — SIGNIFICANT CHANGE UP (ref 0–1)
BILIRUB DIRECT SERPL-MCNC: <0.2 MG/DL — SIGNIFICANT CHANGE UP (ref 0–0.3)
BILIRUB INDIRECT FLD-MCNC: SIGNIFICANT CHANGE UP MG/DL (ref 0.2–1.2)
BILIRUB SERPL-MCNC: <0.2 MG/DL — SIGNIFICANT CHANGE UP (ref 0.2–1.2)
BILIRUB UR-MCNC: NEGATIVE — SIGNIFICANT CHANGE UP
BUN SERPL-MCNC: 10 MG/DL — SIGNIFICANT CHANGE UP (ref 10–20)
CA-I SERPL-SCNC: 1.18 MMOL/L — SIGNIFICANT CHANGE UP (ref 1.15–1.33)
CALCIUM SERPL-MCNC: 9 MG/DL — SIGNIFICANT CHANGE UP (ref 8.5–10.1)
CHLORIDE SERPL-SCNC: 96 MMOL/L — LOW (ref 98–110)
CO2 SERPL-SCNC: 25 MMOL/L — SIGNIFICANT CHANGE UP (ref 17–32)
COLOR SPEC: SIGNIFICANT CHANGE UP
CREAT SERPL-MCNC: 0.6 MG/DL — LOW (ref 0.7–1.5)
DIFF PNL FLD: NEGATIVE — SIGNIFICANT CHANGE UP
EGFR: 118 ML/MIN/1.73M2 — SIGNIFICANT CHANGE UP
EOSINOPHIL # BLD AUTO: 0 K/UL — SIGNIFICANT CHANGE UP (ref 0–0.7)
EOSINOPHIL NFR BLD AUTO: 0 % — SIGNIFICANT CHANGE UP (ref 0–8)
EPI CELLS # UR: 2 /HPF — SIGNIFICANT CHANGE UP (ref 0–5)
GAS PNL BLDV: 130 MMOL/L — LOW (ref 136–145)
GAS PNL BLDV: SIGNIFICANT CHANGE UP
GAS PNL BLDV: SIGNIFICANT CHANGE UP
GLUCOSE SERPL-MCNC: 333 MG/DL — HIGH (ref 70–99)
GLUCOSE UR QL: ABNORMAL
HCG SERPL QL: NEGATIVE — SIGNIFICANT CHANGE UP
HCO3 BLDV-SCNC: 27 MMOL/L — SIGNIFICANT CHANGE UP (ref 22–29)
HCT VFR BLD CALC: 30.1 % — LOW (ref 37–47)
HCT VFR BLDA CALC: 31 % — LOW (ref 39–51)
HGB BLD CALC-MCNC: 10.3 G/DL — LOW (ref 12.6–17.4)
HGB BLD-MCNC: 8.6 G/DL — LOW (ref 12–16)
HYALINE CASTS # UR AUTO: 1 /LPF — SIGNIFICANT CHANGE UP (ref 0–7)
HYPOCHROMIA BLD QL: SIGNIFICANT CHANGE UP
KETONES UR-MCNC: NEGATIVE — SIGNIFICANT CHANGE UP
LACTATE BLDV-MCNC: 1.8 MMOL/L — SIGNIFICANT CHANGE UP (ref 0.5–2)
LACTATE SERPL-SCNC: 1.7 MMOL/L — SIGNIFICANT CHANGE UP (ref 0.7–2)
LEUKOCYTE ESTERASE UR-ACNC: ABNORMAL
LIDOCAIN IGE QN: 15 U/L — SIGNIFICANT CHANGE UP (ref 7–60)
LYMPHOCYTES # BLD AUTO: 1.5 K/UL — SIGNIFICANT CHANGE UP (ref 1.2–3.4)
LYMPHOCYTES # BLD AUTO: 12.3 % — LOW (ref 20.5–51.1)
MAGNESIUM SERPL-MCNC: 1.7 MG/DL — LOW (ref 1.8–2.4)
MANUAL SMEAR VERIFICATION: SIGNIFICANT CHANGE UP
MCHC RBC-ENTMCNC: 19.3 PG — LOW (ref 27–31)
MCHC RBC-ENTMCNC: 28.6 G/DL — LOW (ref 32–37)
MCV RBC AUTO: 67.5 FL — LOW (ref 81–99)
MICROCYTES BLD QL: SIGNIFICANT CHANGE UP
MONOCYTES # BLD AUTO: 0.54 K/UL — SIGNIFICANT CHANGE UP (ref 0.1–0.6)
MONOCYTES NFR BLD AUTO: 4.4 % — SIGNIFICANT CHANGE UP (ref 1.7–9.3)
NEUTROPHILS # BLD AUTO: 10.02 K/UL — HIGH (ref 1.4–6.5)
NEUTROPHILS NFR BLD AUTO: 82.4 % — HIGH (ref 42.2–75.2)
NITRITE UR-MCNC: NEGATIVE — SIGNIFICANT CHANGE UP
NRBC # BLD: 1 /100 — HIGH (ref 0–0)
NRBC # BLD: SIGNIFICANT CHANGE UP /100 WBCS (ref 0–0)
OVALOCYTES BLD QL SMEAR: SLIGHT — SIGNIFICANT CHANGE UP
PCO2 BLDV: 42 MMHG — SIGNIFICANT CHANGE UP (ref 39–42)
PH BLDV: 7.41 — SIGNIFICANT CHANGE UP (ref 7.32–7.43)
PH UR: 7.5 — SIGNIFICANT CHANGE UP (ref 5–8)
PLAT MORPH BLD: NORMAL — SIGNIFICANT CHANGE UP
PLATELET # BLD AUTO: 514 K/UL — HIGH (ref 130–400)
PO2 BLDV: 74 MMHG — SIGNIFICANT CHANGE UP
POIKILOCYTOSIS BLD QL AUTO: SLIGHT — SIGNIFICANT CHANGE UP
POLYCHROMASIA BLD QL SMEAR: SLIGHT — SIGNIFICANT CHANGE UP
POTASSIUM BLDV-SCNC: 4.4 MMOL/L — SIGNIFICANT CHANGE UP (ref 3.5–5.1)
POTASSIUM SERPL-MCNC: 5.2 MMOL/L — HIGH (ref 3.5–5)
POTASSIUM SERPL-SCNC: 5.2 MMOL/L — HIGH (ref 3.5–5)
PROT SERPL-MCNC: 7.1 G/DL — SIGNIFICANT CHANGE UP (ref 6–8)
PROT UR-MCNC: SIGNIFICANT CHANGE UP
RBC # BLD: 4.46 M/UL — SIGNIFICANT CHANGE UP (ref 4.2–5.4)
RBC # FLD: 18.7 % — HIGH (ref 11.5–14.5)
RBC BLD AUTO: ABNORMAL
RBC CASTS # UR COMP ASSIST: 0 /HPF — SIGNIFICANT CHANGE UP (ref 0–4)
SAO2 % BLDV: 96 % — SIGNIFICANT CHANGE UP
SARS-COV-2 RNA SPEC QL NAA+PROBE: SIGNIFICANT CHANGE UP
SODIUM SERPL-SCNC: 132 MMOL/L — LOW (ref 135–146)
SP GR SPEC: 1.03 — SIGNIFICANT CHANGE UP (ref 1.01–1.03)
TROPONIN T SERPL-MCNC: <0.01 NG/ML — SIGNIFICANT CHANGE UP
UROBILINOGEN FLD QL: SIGNIFICANT CHANGE UP
WBC # BLD: 12.16 K/UL — HIGH (ref 4.8–10.8)
WBC # FLD AUTO: 12.16 K/UL — HIGH (ref 4.8–10.8)
WBC UR QL: 1 /HPF — SIGNIFICANT CHANGE UP (ref 0–5)

## 2022-07-28 PROCEDURE — 76705 ECHO EXAM OF ABDOMEN: CPT | Mod: 26

## 2022-07-28 PROCEDURE — 93010 ELECTROCARDIOGRAM REPORT: CPT

## 2022-07-28 PROCEDURE — 71045 X-RAY EXAM CHEST 1 VIEW: CPT | Mod: 26

## 2022-07-28 PROCEDURE — 99285 EMERGENCY DEPT VISIT HI MDM: CPT

## 2022-07-28 RX ORDER — MAGNESIUM SULFATE 500 MG/ML
2 VIAL (ML) INJECTION ONCE
Refills: 0 | Status: COMPLETED | OUTPATIENT
Start: 2022-07-28 | End: 2022-07-28

## 2022-07-28 RX ORDER — SODIUM CHLORIDE 9 MG/ML
1000 INJECTION, SOLUTION INTRAVENOUS ONCE
Refills: 0 | Status: COMPLETED | OUTPATIENT
Start: 2022-07-28 | End: 2022-07-28

## 2022-07-28 RX ORDER — FAMOTIDINE 10 MG/ML
20 INJECTION INTRAVENOUS ONCE
Refills: 0 | Status: COMPLETED | OUTPATIENT
Start: 2022-07-28 | End: 2022-07-28

## 2022-07-28 RX ADMIN — Medication 25 GRAM(S): at 12:16

## 2022-07-28 RX ADMIN — FAMOTIDINE 20 MILLIGRAM(S): 10 INJECTION INTRAVENOUS at 10:10

## 2022-07-28 RX ADMIN — SODIUM CHLORIDE 1000 MILLILITER(S): 9 INJECTION, SOLUTION INTRAVENOUS at 13:01

## 2022-07-28 NOTE — ED PROVIDER NOTE - ATTENDING APP SHARED VISIT CONTRIBUTION OF CARE
36 y/o female h/o DM, GERD, spinal stenosis, neuropathy, chronic HAs and abdominal pain, denies PSH, p/w upper abdominal pain x 3 days, persistent, denies modifying factors, + gradual onset non-exertional HA, tremor, non-exertional cp, generalized weakness, nausea and dyspnea, denies fever, v/d, cough, respiratory sx (other than noted above), change in bowel habits or urinary sx, vaginal d/c or other associated complaints at present.  Pt with multiple CT for abd pain (mmost recently 6/17 and 6/9), cardiac eval 1/27 with normal ELAINA, neuro eval 1/4 with normal CTA.    Old chart reviewed.  I have reviewed and agree with the initial nursing note, except as documented in my note.    VSS, awake, alert, non-toxic appearing, no scleral icterus, oropharynx clear, mmm, no jaundice, skin rash or lesions, chest CTAB, non-labored breathing, no w/r/r, +S1/S2, RRR, no m/r/g, abdomen soft, mild ttp upper abdomen w/o peritoneal signs, +BS, no hernias or distention, no pulsatile masses or bruits appreciated, no CVA tenderness, no peripheral edema or deformities, alert, clear speech and steady gait.

## 2022-07-28 NOTE — ED ADULT NURSE NOTE - ABDOMEN
[FreeTextEntry1] : Is a 41-year-old female for annual health assessment.  Surgically we will address her history of reflux and 2 fibroadenomas of the breast and exposure to Covid\par \par Patient has been under considerable stress this year because of her 's Covid and at work.  However she states she is feeling much better and relieved.  She does still get occasional reflux [de-identified] : Patient is feeling well she has no systemic complaints except for occasional reflux.  She had been drinking just a bit more which is 1 glass of wine a day which she has cut back on soft

## 2022-07-28 NOTE — ED PROVIDER NOTE - CARE PROVIDER_API CALL
Oskar John (DO)  Medicine  [address] [place] [address], [address] [place] [address]  Phone: [phone]  Fax: [phone]  Follow Up Time: 4-6 Days

## 2022-07-28 NOTE — ED PROVIDER NOTE - PROGRESS NOTE DETAILS
Pt reports improvement in symptoms, currently asymptomatic. Discussed results and provided copy to pt. Pt understands to follow-up with PMD/her GI doctor/her neurologist. Pt understands to return to ED if symptoms return/worsen. Agreeable to discharge.

## 2022-07-28 NOTE — ED PROVIDER NOTE - OBJECTIVE STATEMENT
38 yo F with PMHx of DM I, diabetic neuropathy, HTN, tachycardia, neuropathy, chronic back pain, migraine headaches, IBS, anxiety, depression, GERD, R hand tremor, and pseudoseizures? presents to the ED c/o moderate upper abdominal pain, mild substernal chest pain, mild frontal headache, and generalized weakness x 3 days. Pain is sharp, intermittent, unrelated to exertion, non-radiating and associated with nausea. She denies taking medication to improve symptoms. She denies other complaints. Pt denies fever, chills, vomiting, diarrhea, headache, dizziness, SOB, back pain, LOC, trauma, urinary symptoms, cough, calf pain/swelling, recent travel, recent surgery.

## 2022-07-28 NOTE — ED PROVIDER NOTE - PATIENT PORTAL LINK FT
You can access the FollowMyHealth Patient Portal offered by [facility] by registering at the following website: [email or web address]. By joining [facility] [employer]’s FollowMyHealth portal, you will also be able to view your health information using other applications (apps) compatible with our system.

## 2022-07-28 NOTE — ED PROVIDER NOTE - NSCAREINITIATED _GEN_ER
Your Child's Health  15-Month-Old Visit      Adrian Shelby  January 27, 2020    Visit Vitals  Pulse 126 Comment: Crying   Temp 97.7 °F (36.5 °C) (Axillary)   Ht 31.5\" (80 cm)   Wt 12.5 kg   HC 45.7 cm (18\")   BMI 19.57 kg/m²     Weight: 27.63 lbs    Patient: Adrian Shelby  Clinician: JULIUS Frey      [facility] Services Description     [x]    Cost Assessment Pharmacy will review therapeutic alternatives, $3.99 medication list, and apply coupons where appropriate     []  Automatic Refill   Pharmacy will assist patient in signing up for automatic refill      []  1:1 Patient  Appointment Pharmacy will schedule a patient appointment to review patient’s medications, provide education, and answer questions     []  Medication  Bubble  Packaging Pharmacy will presort and prepackage medications by dose, time of day and day of the week     []  Immunization Assessment* Pharmacy will assess patient for updated immunizations. Pharmacist will provide as appropriate*     []  Home Delivery Pharmacy will assist patient in signing up for home delivery service     []  Sharps container* ($3.99 initial cost) Pharmacy will provide initial sharps container for $3.99* Disposal and replacement will be free.     *Costs per insurance copay or as specified. Other services are free of charge.    [place] pharmacy locations can be found at [email or web address]      YOUR CHILD’S 15 MONTH-OLD VISIT      Key points at this age…  • Laia should continue to ride in a properly-installed car seat in the back seat. Correct use of a car seat is always critically important for your baby’s safety. For maximum safety, keep the seat rear facing until your child reaches the top height or weight limit allowed by the car seat .        • Lifelong nutritional habits begin now. Avoid starting unhealthy foods (fried fast food, sweets, chips, other bagged snacks) and sweetened drinks like juice and soda. If you do give juice,  limit it to 4 ounces or less of 100% fruit juice daily.    • Take care of that gislea smile! Brush twice daily with a tiny amount of regular (not baby) toothpaste. Avoid sugary and sticky foods as well as juice.       NUTRITION:    It is normal for a child’s appetite to go up and down quite a bit after one year of age. This is because the rate of growth is slowing down - it is normal! Let them work on their self-feeding skills (finger foods, using a spoon) even if they are messy and you don't think they are eating enough. Some days they will eat a lot, some days much less. The key is to offer them healthy food choices in small amounts. If they finish what you give them, they can have more. Most \"picky\" eaters still grow and gain weight normally! Overweight and obesity are serious health problems in our [place], and they often start in childhood. So don't push your child to eat more than they want and don’t give unhealthy foods (fried fast food, sweets, chips and other bagged snacks) to them. Right now you are in charge of what's going into them - keep it healthy!    Milk and water are the healthiest drink choices for your child. The milk is important because it provides calcium for bone health; they should ideally get 16 to 20 ounces of milk each day.     Eating fruit is much healthier than drinking juice. Even \"natural\" juices have extra sugars that can lead to tooth decay and weight gain. Children between 1 and 3 years of age should have no more than 4 ounces of 100% fruit juice daily. Do not water it down in a bottle or sippy cup that they can carry around and drink from over an extended period of time; this frequent exposure to the sugars in juice (even if diluted with water) just increases their risk of tooth decay.      Keep avoiding hard, chunky or chewy foods that a child could choke on. Be careful when serving foods like fruits (especially grapes) and vegetables--make sure they are cut into small pieces  that will not pose a choking risk.     Even when eating a very well balanced diet, children need some extra vitamin D. A liquid preparation of pure vitamin D (400 or 600 IU) or a multivitamin with iron drop is recommended.  (They are currently too young for a chewable vitamin because they could choke on those.)    Everything your child drinks at this age should be from a cup. If your child is not yet off the bottle, talk to your doctor about ways to work on this. Using the bottle at this age is only going to cause problems (nutritional, dental).      DEVELOPMENT:  Most children at this age will listen to a story and imitate what you do.  They may have just a few words (besides “mama” and “adriana”) but will let you know what they want by pulling you towards something, grunting and pointing. They understand when you tell them to do simple things (although they might not always do it!). They may scribble if you give them a crayon or marker. And they will start moving around faster and faster! Most will run and climb whenever they see an opportunity and really won’t be bothered by minor falls and bruises--watching a toddler requires constant attention!    They may bring a book to you to read--encourage this because books are a great way to work on their language. (You don’t even have to read the story--just point to pictures, talk about the story and encourage your child to say words.) Avoid the temptation to put them in front of the TV or to put a cell phone or pad in their hands--the American Academy of Pediatrics does not recommend any “screen time” before age 2 years.      DISCIPLINE:  Parents and older family members need to agree on rules about how to respond to your toddler when he or she does something dangerous or undesirable (like hitting or biting - those behaviors will not be cute as they grow older). Consistency is important. If all family members react to the child's behaviors in the same way, the child will  soon learn which behaviors are more likely to earn them praise and smiles (positive attention).     Keep it simple at this age. Saying a firm “no”, redirecting the child to a different toy or activity or briefly ignoring them (as long as they are not getting into harm’s way) will work better than yelling or long explanations about why you want them to not do something. Long lectures may actually reinforce the undesired behavior because you are paying attention to your child when you are lecturing them--remember that your attention is what your child wants most from you!     Temper tantrums may start in the near future. Toddlers get frustrated easily because they realize there are so many things they want to be doing, but they just are not capable (or permitted!) to do them. If your child starts having tantrums, leave them in a safe place (like the center of a room) and walk away or turn your back.  Don't say anything until they are done. Once they quiet down, don't lecture them (they don't get that!). Instead, start a new activity or grab a book - they need to focus on something new rather than dwell on what upset them. To make life easier (and reduce the number of rules in your home), set up your home to be as safe as possible. Put away dangerous and valuable or fragile items. If you have fewer things that are off-limits to children, there are fewer opportunities for them to get into trouble.    DENTAL CARE:   Establish a regular brushing routine twice a day, ideally after breakfast and before bed. You should be brushing twice a day with a tiny smear (the size of a grain of rice!) of regular (fluoridated) toothpaste (not baby toothpaste).  Many toddlers want to do this themselves--they may be enthusiastic about it, but they are going to miss spots! It’s important for a parent to get in there every time and make sure all the teeth have been cleaned.     Fluoride is very important for your child’s dental health.  In  addition to brushing with fluoridated toothpaste, they should be drinking the tap (city) water (which is carefully monitored so it has the ideal amount of fluoride for dental health). If you have well water instead of a city water supply, however, you should have it tested for fluoride content to determine whether your child might need fluoride supplements.     SAFETY/ACCIDENT PREVENTION:    Car Safety:  An approved car seat in the back seat is required by [place] law. Your child is safest in a rear-facing convertible car seat right now: keep the seat in a rear-facing position until your child reaches the top height or weight limit allowed by the car seat . (Even if your child is tall and they have to keep their legs bent, they are still safer when rear facing.)    Poisoning: Keep all cleaning and chemical products safely stored out of sight and out of reach. (Check for what is under your bathroom sink as well as your kitchen sink.) Keep purses (your own and ones belonging to visitors) out of reach of curious toddlers; they can quickly find medicines, cigarettes, and small objects to choke on!     Keep the original bottles and safety caps for all medicines, including vitamins; do not transfer medicines to non-child-proofed or unlabeled containers. Be especially careful when around older people because they may keep their medicines out in the open or in non-childproofed containers.     Is this number in your cell phone? Call the Poison Center at 1-360.830.5018 for any known or suspected poisoning.       Falls: Your toddler will start moving around faster and faster! Be aware of what they can run into (furniture with sharp edges) and fall down, out or off of (stairs, windows, stepstools). Keep gordon on stairs and window latches on upper-story windows.    Burns: A toddler’s independence puts them at risk for burns because they want to touch everything they see (pans on the stove or freshly out of the  oven, any water faucet within reach, irons, curling irons). Keep your water heater at a maximum temperature of 120-125°F to prevent scald burns. Be very careful to keep hot items out of reach. Have a family fire safety plan, including regular smoke detector (and carbon monoxide detector) battery checks, working fire extinguishers, and escape routes.    Water Safety: Toddlers often love the water but they need to be very closely supervised around it (this means tubs, buckets, wading pools and toilets as well as pools and lakes!).Children can drown in just a couple inches of water, so never leave an infant or toddler alone in a tub. Also, do not rely on older children to properly supervise the younger ones. An adult should always be within arm’s reach whenever a young child is in or around water.     Most children are not developmentally ready for swimming lessons until ~4 years of age. Swim programs for younger children have not been proven to prevent drowning, nor will life jackets and “swimmies” protect your child from drowning! Constant supervision by an adult who knows how to swim is critical. Permanent pools (in ground and above ground) should be fenced off (and locked), and wading pools should be drained when not in use. Keep large buckets empty and keep toilet seat lids down and secured with a safety lock if possible.     Smoke Exposure: Continue to protect your child from cigarette smoke. Exposure to smoke will increase their risk of asthma, ear infections, and respiratory infections (pneumonia). If you smoke and are ready to consider quitting, talk to your doctor. Nicotine replacement products can be very helpful in breaking this tough addiction.       SLEEP: Consistency is key to good sleep habits!  1.  Provide a consistent dinner time (ideally not immediately before bedtime).   2.  Provide a consistent bedtime--keep it the same for weeknights and weekdays.  3.  Avoid stimulating activity before bedtime,  including scary or intense movies or TV shows and high-energy physical activity or play.   4. Provide a consistent and reassuring routine (bath, brushing teeth, song/story, sleep). Your child should always be put to bed in the same bed.  5. Your child should always be put to bed sleepy but awake.  6.  Provide a “transition” object.  This could be a pillow, favorite blanket, or stuffed animal. Don't give pacifiers or other items to suck on; also avoid items on strings and anything with small pieces that could be swallowed.  7.  Provide a consistent wake-up time (unless your child is ill and needs extra sleep).    8. Night waking may develop, even in children who have previously slept well. If your child wakes up at night, visit them briefly, but do not spend a lot of time or do a lot of talking or extended reassurance. Make sure they have their “transition” object so they can console themselves back to sleep.     SHOES:  Children need shoes to protect their feet when they start walking outside. They don’t need special arches or  fashions.  Buy shoes that have a roomy fit and flat, flexible soles with nonskid bottoms. Inexpensive ones are okay--they outgrow them quickly!     SUN EXPOSURE:  Protect your child from direct sun as much as you can. Keep them in the shade as much as possible and use protective clothing (including hats and sunglasses) when you are out. Always use sunscreen when your child is going to be outside. Choose one labelled as “broad spectrum” (which means it protects against both UVA and UVB rays) and with an SPF of 15 to 30; apply it to your child’s skin at least 20 to 30 minutes before going out in the sun. Zinc oxide (or titanium dioxide) products are good for sensitive spots (nose, cheeks, ears, shoulders); these don’t “rub in” all the way, but kids often like the fun colors they come in!      MEDICATION FOR FEVER OR PAIN:   Acetaminophen liquid (e.g., Tylenol or Tempra) may be given  every four hours as needed for pain or fever.  Be sure to check which product CONCENTRATION you are using.    INFANT Tylenol/Acetaminophen  Drops (160 mg/5 mL)    Child’s Weight: Dose:  18 - 23 pounds:   120 mg (3.75 mL (3/4 Teaspoon))  23 - 27 pounds:   160 mg (5.0 mL (1 Teaspoon))    CHILDREN’S Tylenol/Acetaminophen  (160 mg/5 mL)    Child’s Weight: Dose:  18 - 23 pounds:   120 mg (3.75 mL (3/4 Teaspoon))  23 - 27 pounds:   160 mg (5.0 mL (1 Teaspoon))    INFANT Ibuprofen (50 mg/1.25 ml) liquid (for example Advil or Motrin) may be given every 6 hours as needed for pain or fever.    Child’s Weight: Dose:  18 - 23 pounds:   75 mg (1.875 mL)    CHILDREN'S Ibuprofen (100 mg/5 mL) liquid (for example Advil or Motrin) may be given every 6 hours as needed for pain or fever.    Child’s Weight: Dose:  18 - 23 pounds:   75 mg (3.75 mL (3/4 Teaspoon))  24 - 35 pounds:   100 mg (5 mL (1Teaspoon)))    If Laia is outside these weight ranges, call your pediatrician's office for advice.    Most Recent Immunizations   Administered Date(s) Administered   • DTaP/Hep B/IPV 07/23/2019   • Hib (PRP-T) 07/23/2019   • Influenza, injectable, quadrivalent, preservative-free 10/23/2019   • MMR 10/23/2019   • Pneumococcal Conjugate 13 valent 10/23/2019   • Rotavirus - pentavalent 04/23/2019   • Varicella 10/23/2019       If Laia develops any of the following reactions within 72 hours after an immunization, notify your pediatrician by calling the pediatric phone nurse:  1. A temperature of 105 degrees or above  2. More than 3 hours of continuous crying  3. A shrill, high-pitched cry  4. A pale, limp spell  5. A seizure or fainting spell.  In this case, you should call 911 or go immediately to the emergency room.      NEXT VISIT: 18 MONTHS OF AGE    Thank you for entrusting your care to [facility].      Also, check out “Children’s Health” on the [facility] Blog for updates on timely topics regarding children’s health!   Serge Corey(PA)

## 2022-07-28 NOTE — ED PROVIDER NOTE - NS ED ROS FT
Review of Systems  Constitutional:  No fever, chills. (+) generalized weakness  Eyes:  No visual changes, eye pain, or discharge.  ENMT:  No hearing changes, pain, or discharge. No nasal congestion, discharge, or bleeding. No throat pain, swelling, or difficulty swallowing.  Cardiac:  No palpitations, syncope, or edema. (+) chest pain  Respiratory:  No dyspnea, cough. No hemoptysis.  GI:  No vomiting, diarrhea. (+) abd pain, nausea  :  No dysuria, hematuria, frequency, or burning.   MS:  No back pain.  Skin:  No skin rash, pruritis, jaundice, or lesions.  Neuro:  No dizziness, loss of sensation, or focal weakness.  No change in mental status. (+) headache

## 2022-07-28 NOTE — ED PROVIDER NOTE - PHYSICAL EXAMINATION
VITAL SIGNS: I have reviewed nursing notes and confirm.  CONSTITUTIONAL: Well-developed; well-nourished; in no acute distress.  SKIN: Skin exam is warm and dry, no acute rash.  HEAD: Normocephalic; atraumatic.  EYES: PERRL, EOM intact; conjunctiva and sclera clear.  ENT: No nasal discharge; airway clear.  NECK: Supple; non tender.  CARD: S1, S2 normal; no murmurs, gallops, or rubs. Regular rate and rhythm.  RESP: No wheezes, rales or rhonchi. Speaking in full sentences.   ABD: Normal bowel sounds; soft; non-distended; (+) mild epigastric/RUQ TTP; No rebound or guarding. No CVA tenderness.  EXT: Normal ROM. No clubbing, cyanosis or edema. No calf TTP or swelling.   NEURO: Alert, oriented. Grossly unremarkable. No focal deficits.

## 2022-07-28 NOTE — ED ADULT TRIAGE NOTE - CHIEF COMPLAINT QUOTE
Patient complaining of headache. tremors, weakness in legs, chest pain and abdominal pain x3 days. Denies any nausea, vomiting or diarrhea.

## 2022-07-30 LAB
CULTURE RESULTS: SIGNIFICANT CHANGE UP
SPECIMEN SOURCE: SIGNIFICANT CHANGE UP

## 2022-08-01 RX ORDER — NITROFURANTOIN MACROCRYSTAL 50 MG
1 CAPSULE ORAL
Qty: 14 | Refills: 0
Start: 2022-08-01 | End: 2022-08-07

## 2022-08-01 NOTE — ED POST DISCHARGE NOTE - DETAILS
SPOKE TO JAYY AT [facility] AND [facility] SENT TO [facility]. SHE WILL NOTIFY PMD AT FACILITY AND CALL LAB TO GET RESULTS

## 2022-08-05 ENCOUNTER — APPOINTMENT (OUTPATIENT)
Dept: PSYCHIATRY | Facility: CLINIC | Age: 37
End: 2022-08-05

## 2022-08-05 ENCOUNTER — OUTPATIENT (OUTPATIENT)
Dept: OUTPATIENT SERVICES | Facility: HOSPITAL | Age: 37
LOS: 1 days | Discharge: HOME | End: 2022-08-05

## 2022-08-05 DIAGNOSIS — F41.1 GENERALIZED ANXIETY DISORDER: ICD-10-CM

## 2022-08-05 PROCEDURE — 99214 OFFICE O/P EST MOD 30 MIN: CPT

## 2022-08-12 ENCOUNTER — EMERGENCY (EMERGENCY)
Facility: HOSPITAL | Age: 37
LOS: 0 days | Discharge: HOME | End: 2022-08-13
Attending: STUDENT IN AN ORGANIZED HEALTH CARE EDUCATION/TRAINING PROGRAM | Admitting: STUDENT IN AN ORGANIZED HEALTH CARE EDUCATION/TRAINING PROGRAM

## 2022-08-12 VITALS
HEART RATE: 100 BPM | TEMPERATURE: 99 F | RESPIRATION RATE: 18 BRPM | DIASTOLIC BLOOD PRESSURE: 80 MMHG | SYSTOLIC BLOOD PRESSURE: 138 MMHG | HEIGHT: 65 IN | OXYGEN SATURATION: 100 %

## 2022-08-12 DIAGNOSIS — F32.9 MAJOR DEPRESSIVE DISORDER, SINGLE EPISODE, UNSPECIFIED: ICD-10-CM

## 2022-08-12 DIAGNOSIS — Z79.4 LONG TERM (CURRENT) USE OF INSULIN: ICD-10-CM
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
## 2022-08-12 DIAGNOSIS — Z88.8 ALLERGY STATUS TO OTHER DRUGS, MEDICAMENTS AND BIOLOGICAL SUBSTANCES STATUS: ICD-10-CM

## 2022-08-12 DIAGNOSIS — R30.0 DYSURIA: ICD-10-CM

## 2022-08-12 DIAGNOSIS — M25.552 PAIN IN LEFT HIP: ICD-10-CM

## 2022-08-12 DIAGNOSIS — R00.0 TACHYCARDIA, UNSPECIFIED: ICD-10-CM

## 2022-08-12 DIAGNOSIS — Z88.0 ALLERGY STATUS TO PENICILLIN: ICD-10-CM

## 2022-08-12 DIAGNOSIS — E10.9 TYPE 1 DIABETES MELLITUS WITHOUT COMPLICATIONS: ICD-10-CM

## 2022-08-12 DIAGNOSIS — Z79.82 LONG TERM (CURRENT) USE OF ASPIRIN: ICD-10-CM

## 2022-08-12 DIAGNOSIS — K21.9 GASTRO-ESOPHAGEAL REFLUX DISEASE WITHOUT ESOPHAGITIS: ICD-10-CM

## 2022-08-12 DIAGNOSIS — R11.10 VOMITING, UNSPECIFIED: ICD-10-CM

## 2022-08-12 DIAGNOSIS — Z88.7 ALLERGY STATUS TO SERUM AND VACCINE: ICD-10-CM
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
## 2022-08-12 DIAGNOSIS — Z88.1 ALLERGY STATUS TO OTHER ANTIBIOTIC AGENTS STATUS: ICD-10-CM

## 2022-08-12 LAB
ALBUMIN SERPL ELPH-MCNC: 4.3 G/DL — SIGNIFICANT CHANGE UP (ref 3.5–5.2)
ALP SERPL-CCNC: 177 U/L — HIGH (ref 30–115)
ALT FLD-CCNC: 30 U/L — SIGNIFICANT CHANGE UP (ref 0–41)
ANION GAP SERPL CALC-SCNC: 11 MMOL/L — SIGNIFICANT CHANGE UP (ref 7–14)
ANISOCYTOSIS BLD QL: SIGNIFICANT CHANGE UP
APPEARANCE UR: CLEAR — SIGNIFICANT CHANGE UP
AST SERPL-CCNC: 18 U/L — SIGNIFICANT CHANGE UP (ref 0–41)
BACTERIA # UR AUTO: NEGATIVE — SIGNIFICANT CHANGE UP
BASOPHILS # BLD AUTO: 0.14 K/UL — SIGNIFICANT CHANGE UP (ref 0–0.2)
BASOPHILS NFR BLD AUTO: 1.7 % — HIGH (ref 0–1)
BILIRUB DIRECT SERPL-MCNC: <0.2 MG/DL — SIGNIFICANT CHANGE UP (ref 0–0.3)
BILIRUB INDIRECT FLD-MCNC: SIGNIFICANT CHANGE UP MG/DL (ref 0.2–1.2)
BILIRUB SERPL-MCNC: <0.2 MG/DL — SIGNIFICANT CHANGE UP (ref 0.2–1.2)
BILIRUB UR-MCNC: NEGATIVE — SIGNIFICANT CHANGE UP
BUN SERPL-MCNC: 10 MG/DL — SIGNIFICANT CHANGE UP (ref 10–20)
CALCIUM SERPL-MCNC: 8.9 MG/DL — SIGNIFICANT CHANGE UP (ref 8.5–10.1)
CHLORIDE SERPL-SCNC: 105 MMOL/L — SIGNIFICANT CHANGE UP (ref 98–110)
CO2 SERPL-SCNC: 25 MMOL/L — SIGNIFICANT CHANGE UP (ref 17–32)
COLOR SPEC: SIGNIFICANT CHANGE UP
CREAT SERPL-MCNC: 0.5 MG/DL — LOW (ref 0.7–1.5)
DIFF PNL FLD: ABNORMAL
EGFR: 124 ML/MIN/1.73M2 — SIGNIFICANT CHANGE UP
EOSINOPHIL # BLD AUTO: 0.22 K/UL — SIGNIFICANT CHANGE UP (ref 0–0.7)
EOSINOPHIL NFR BLD AUTO: 2.6 % — SIGNIFICANT CHANGE UP (ref 0–8)
EPI CELLS # UR: 1 /HPF — SIGNIFICANT CHANGE UP (ref 0–5)
GIANT PLATELETS BLD QL SMEAR: PRESENT — SIGNIFICANT CHANGE UP
GLUCOSE SERPL-MCNC: 398 MG/DL — HIGH (ref 70–99)
GLUCOSE UR QL: ABNORMAL
HCG SERPL QL: NEGATIVE — SIGNIFICANT CHANGE UP
HCT VFR BLD CALC: 28.7 % — LOW (ref 37–47)
HGB BLD-MCNC: 8.1 G/DL — LOW (ref 12–16)
HYALINE CASTS # UR AUTO: 0 /LPF — SIGNIFICANT CHANGE UP (ref 0–7)
HYPOCHROMIA BLD QL: SIGNIFICANT CHANGE UP
KETONES UR-MCNC: NEGATIVE — SIGNIFICANT CHANGE UP
LACTATE SERPL-SCNC: 2.4 MMOL/L — HIGH (ref 0.7–2)
LEUKOCYTE ESTERASE UR-ACNC: NEGATIVE — SIGNIFICANT CHANGE UP
LIDOCAIN IGE QN: 28 U/L — SIGNIFICANT CHANGE UP (ref 7–60)
LYMPHOCYTES # BLD AUTO: 1.82 K/UL — SIGNIFICANT CHANGE UP (ref 1.2–3.4)
LYMPHOCYTES # BLD AUTO: 21.8 % — SIGNIFICANT CHANGE UP (ref 20.5–51.1)
MANUAL SMEAR VERIFICATION: SIGNIFICANT CHANGE UP
MCHC RBC-ENTMCNC: 18.9 PG — LOW (ref 27–31)
MCHC RBC-ENTMCNC: 28.2 G/DL — LOW (ref 32–37)
MCV RBC AUTO: 66.9 FL — LOW (ref 81–99)
MICROCYTES BLD QL: SIGNIFICANT CHANGE UP
MONOCYTES # BLD AUTO: 0.65 K/UL — HIGH (ref 0.1–0.6)
MONOCYTES NFR BLD AUTO: 7.8 % — SIGNIFICANT CHANGE UP (ref 1.7–9.3)
NEUTROPHILS # BLD AUTO: 5.52 K/UL — SIGNIFICANT CHANGE UP (ref 1.4–6.5)
NEUTROPHILS NFR BLD AUTO: 66.1 % — SIGNIFICANT CHANGE UP (ref 42.2–75.2)
NITRITE UR-MCNC: NEGATIVE — SIGNIFICANT CHANGE UP
PH UR: 6 — SIGNIFICANT CHANGE UP (ref 5–8)
PLAT MORPH BLD: NORMAL — SIGNIFICANT CHANGE UP
PLATELET # BLD AUTO: 624 K/UL — HIGH (ref 130–400)
POLYCHROMASIA BLD QL SMEAR: SIGNIFICANT CHANGE UP
POTASSIUM SERPL-MCNC: 4.8 MMOL/L — SIGNIFICANT CHANGE UP (ref 3.5–5)
POTASSIUM SERPL-SCNC: 4.8 MMOL/L — SIGNIFICANT CHANGE UP (ref 3.5–5)
PROT SERPL-MCNC: 7.1 G/DL — SIGNIFICANT CHANGE UP (ref 6–8)
PROT UR-MCNC: NEGATIVE — SIGNIFICANT CHANGE UP
RBC # BLD: 4.29 M/UL — SIGNIFICANT CHANGE UP (ref 4.2–5.4)
RBC # FLD: 19.8 % — HIGH (ref 11.5–14.5)
RBC BLD AUTO: ABNORMAL
RBC CASTS # UR COMP ASSIST: 131 /HPF — HIGH (ref 0–4)
SODIUM SERPL-SCNC: 141 MMOL/L — SIGNIFICANT CHANGE UP (ref 135–146)
SP GR SPEC: 1.04 — HIGH (ref 1.01–1.03)
UROBILINOGEN FLD QL: SIGNIFICANT CHANGE UP
WBC # BLD: 8.35 K/UL — SIGNIFICANT CHANGE UP (ref 4.8–10.8)
WBC # FLD AUTO: 8.35 K/UL — SIGNIFICANT CHANGE UP (ref 4.8–10.8)
WBC UR QL: 5 /HPF — SIGNIFICANT CHANGE UP (ref 0–5)

## 2022-08-12 PROCEDURE — 99284 EMERGENCY DEPT VISIT MOD MDM: CPT

## 2022-08-12 PROCEDURE — 73502 X-RAY EXAM HIP UNI 2-3 VIEWS: CPT | Mod: 26,LT

## 2022-08-12 RX ORDER — SODIUM CHLORIDE 9 MG/ML
1000 INJECTION, SOLUTION INTRAVENOUS ONCE
Refills: 0 | Status: COMPLETED | OUTPATIENT
Start: 2022-08-12 | End: 2022-08-12

## 2022-08-12 RX ORDER — ONDANSETRON 8 MG/1
4 TABLET, FILM COATED ORAL ONCE
Refills: 0 | Status: COMPLETED | OUTPATIENT
Start: 2022-08-12 | End: 2022-08-12

## 2022-08-12 RX ADMIN — SODIUM CHLORIDE 1000 MILLILITER(S): 9 INJECTION, SOLUTION INTRAVENOUS at 22:27

## 2022-08-12 RX ADMIN — ONDANSETRON 4 MILLIGRAM(S): 8 TABLET, FILM COATED ORAL at 22:27

## 2022-08-12 NOTE — ED PROVIDER NOTE - NS ED ROS FT
Review of Systems:  	•	CONSTITUTIONAL: no fever   	•	SKIN: no rash   	•	ENT: no sore throat   	•	RESPIRATORY: no shortness of breath, no cough  	•	CARDIAC: no chest pain, no palpitations  	•	GI: no abd pain, no diarrhea   	•	GENITO-URINARY: no discharge, no dysuria; no hematuria, no increased urinary frequency  	•	MUSCULOSKELETAL: +L sided hip pain, +L sided back pain   	•	NEUROLOGIC: no weakness, no headache, no syncope   	•	PSYCH: no anxiety, non suicidal, non homicidal, no hallucination, no depression

## 2022-08-12 NOTE — ED ADULT TRIAGE NOTE - NSPATIENTFLAG_GEN_A_ER
Purple DH (Discharge Huddle; Vulnerable Patient)
Clear bilaterally, pupils equal, round and reactive to light.

## 2022-08-12 NOTE — ED PROVIDER NOTE - NSFOLLOWUPINSTRUCTIONS_ED_ALL_ED_FT
Hip Pain    Your hip is the joint between your upper legs and your lower pelvis. The bones, cartilage, tendons, and muscles of your hip joint perform a lot of work each day supporting your body weight and allowing you to move around.    Hip pain can range from a minor ache to severe pain in one or both of your hips. Pain may be felt on the inside of the hip joint near the groin, or the outside near the buttocks and upper thigh. You may have swelling or stiffness as well.     HOME CARE INSTRUCTIONS  Take medicines only as directed by your health care provider.  Apply ice to the injured area:  Put ice in a plastic bag.  Place a towel between your skin and the bag.  Leave the ice on for 15–20 minutes at a time, 3–4 times a day.  Keep your leg raised (elevated) when possible to lessen swelling.  Avoid activities that cause pain.  Follow specific exercises as directed by your health care provider.  Sleep with a pillow between your legs on your most comfortable side.  Record how often you have hip pain, the location of the pain, and what it feels like.     SEEK MEDICAL CARE IF:  You are unable to put weight on your leg.  Your hip is red or swollen or very tender to touch.  Your pain or swelling continues or worsens after 1 week.  You have increasing difficulty walking.  You have a fever.    SEEK IMMEDIATE MEDICAL CARE IF:  You have fallen.  You have a sudden increase in pain and swelling in your hip.    MAKE SURE YOU:  Understand these instructions.  Will watch your condition.  Will get help right away if you are not doing well or get worse.    ADDITIONAL NOTES AND INSTRUCTIONS    Please follow up with your Primary MD in 24-48 hr.  Seek immediate medical care for any new/worsening signs or symptoms.     Fall Prevention in the Home    Falls can cause injuries and can affect people from all age groups. There are many simple things that you can do to make your home safe and to help prevent falls.    WHAT CAN I DO ON THE OUTSIDE OF MY HOME?  Regularly repair the edges of walkways and driveways and fix any cracks.  Remove high doorway thresholds.  Trim any shrubbery on the main path into your home.  Use bright outdoor lighting.  Clear walkways of debris and clutter, including tools and rocks.  Regularly check that handrails are securely fastened and in good repair. Both sides of any steps should have handrails.  Install guardrails along the edges of any raised decks or porches.  Have leaves, snow, and ice cleared regularly.  Use sand or salt on walkways during winter months.  In the garage, clean up any spills right away, including grease or oil spills.    WHAT CAN I DO IN THE BATHROOM?  Use night lights.  Install grab bars by the toilet and in the tub and shower. Do not use towel bars as grab bars.  Use non-skid mats or decals on the floor of the tub or shower.  If you need to sit down while you are in the shower, use a plastic, non-slip stool.  Keep the floor dry. Immediately clean up any water that spills on the floor.  Remove soap buildup in the tub or shower on a regular basis.  Attach bath mats securely with double-sided non-slip rug tape.  Remove throw rugs and other tripping hazards from the floor.    WHAT CAN I DO IN THE BEDROOM?  Use night lights.  Make sure that a bedside light is easy to reach.  Do not use oversized bedding that drapes onto the floor.  Have a firm chair that has side arms to use for getting dressed.  Remove throw rugs and other tripping hazards from the floor.    WHAT CAN I DO IN THE KITCHEN?  Clean up any spills right away.  Avoid walking on wet floors.  Place frequently used items in easy-to-reach places.  If you need to reach for something above you, use a sturdy step stool that has a grab bar.  Keep electrical cables out of the way.  Do not use floor polish or wax that makes floors slippery. If you have to use wax, make sure that it is non-skid floor wax.  Remove throw rugs and other tripping hazards from the floor.    WHAT CAN I DO IN THE STAIRWAYS?  Do not leave any items on the stairs.  Make sure that there are handrails on both sides of the stairs. Fix handrails that are broken or loose. Make sure that handrails are as long as the stairways.  Check any carpeting to make sure that it is firmly attached to the stairs. Fix any carpet that is loose or worn.  Avoid having throw rugs at the top or bottom of stairways, or secure the rugs with carpet tape to prevent them from moving.  Make sure that you have a light switch at the top of the stairs and the bottom of the stairs. If you do not have them, have them installed.    WHAT ARE SOME OTHER FALL PREVENTION TIPS?  Wear closed-toe shoes that fit well and support your feet. Wear shoes that have rubber soles or low heels.  When you use a stepladder, make sure that it is completely opened and that the sides are firmly locked. Have someone hold the ladder while you are using it. Do not climb a closed stepladder.  Add color or contrast paint or tape to grab bars and handrails in your home. Place contrasting color strips on the first and last steps.  Use mobility aids as needed, such as canes, walkers, scooters, and crutches.  Turn on lights if it is dark. Replace any light bulbs that burn out.  Set up furniture so that there are clear paths. Keep the furniture in the same spot.  Fix any uneven floor surfaces.  Choose a carpet design that does not hide the edge of steps of a stairway.  Be aware of any and all pets.  Review your medicines with your healthcare provider. Some medicines can cause dizziness or changes in blood pressure, which increase your risk of falling.     Talk with your health care provider about other ways that you can decrease your risk of falls. This may include working with a physical therapist or  to improve your strength, balance, and endurance.    ADDITIONAL NOTES AND INSTRUCTIONS    Please follow up with your Primary MD in 24-48 hr.  Seek immediate medical care for any new/worsening signs or symptoms.

## 2022-08-12 NOTE — ED PROVIDER NOTE - CARE PROVIDER_API CALL
Oskar John (DO)  Medicine  [address] [place] [address], [address] [place] [address]  Phone: [phone]  Fax: [phone]  Follow Up Time: 4-6 Days

## 2022-08-12 NOTE — ED ADULT NURSE NOTE - OBJECTIVE STATEMENT
pt presents to the department s/p fall 2 days ago. Pt endorses L sided lower back/hip pain at rest and walking. Pt states bilaterally her legs feel "wobbly." Pt also endorses vomiting. PMHx: DM and pt states her fingersticks have been running high this week (200s). Pt AAOx4, speaking in clear and complete sentences.

## 2022-08-12 NOTE — ED PROVIDER NOTE - PHYSICAL EXAMINATION
CONSTITUTIONAL: Well-developed; well-nourished; in no acute distress, nontoxic appearing  SKIN: skin exam is warm and dry  ENT: MMM   CARD: S1, S2 normal, no murmur  RESP: No wheezes, rales or rhonchi. Good air movement bilaterally  ABD: soft; non-distended; non-tender. No Rebound, No guarding. No CVAT   EXT: +TTP overlying L hip, no skin changes, no midline tenderness.   NEURO: awake, alert, following commands, oriented, grossly unremarkable. No Focal deficits. GCS 15.   PSYCH: Cooperative, appropriate.

## 2022-08-12 NOTE — ED ADULT NURSE REASSESSMENT NOTE - NS ED NURSE REASSESS COMMENT FT1
PIV #18 L AC was inserted via ultrasound by TORY Zafar. Pt received LR bolus and zofran 4mg outside of scheduled medication due to lack of access. Will continue to monitor.

## 2022-08-12 NOTE — ED PROVIDER NOTE - CLINICAL SUMMARY MEDICAL DECISION MAKING FREE TEXT BOX
36 yo F w/ hx as documented p/w left hip pain after fall as well as dysuria and 1 episode of vomiting this afternoon. no fevers. no abd pain. On exam, vitals stable, nontender abdomen, no cva tenderness bilaterally. ambulating without difficulty, tenderness along the lateral hip however ROM intact to the left hip without pain. no ecchymosis. labs reviewed. imaging without acute fracture. follow up with PMD. return precaution discussed in detail.

## 2022-08-12 NOTE — ED PROVIDER NOTE - OBJECTIVE STATEMENT
37 year old female, past medical history type I DM, degenerative disc disease, mdd, gerd, tachycardia, who presents with L hip pain. patient with recent mechanical slip and fall in bathroom x2 days ago, hitting L hip on corner of toilet. patient did not hit head, no loc, no ac use. patient reports pain worse with movement and upon palpation. patient recently diagnosed with uti, completed macrobid prescription, reports dysuria since. denies f/c, headache, sore throat, chest pain, shortness of breath, abd pain, diarrhea. patient currently on menses.

## 2022-08-12 NOTE — ED ADULT NURSE NOTE - CAS EDN DISCHARGE ASSESSMENT
Subjective:      CC:  Primary hyperparathyroidism     HPI:  Nely Arevalo is an 55 year old female whom Dr. Guerrero was consulted by Dr. Granger for evaluation of primary hyperparathyroidism. Elevated calcium levels were noted on routine labs. Susequent evaluation revealed elevated PTH and 24 hour urinary calcium (302). Highest calcium has been 10.6 with a PTH of 124. Vitamin D has been replete. Indications for surgical intervention include bone loss, elevated urinary calcium and relatively young age. Parathyroid US suggests right inferior parathyroid adenoma, CT suggests possible bilateral inferior parathyroid adenomas. Plan to initially target right inferior lesion. Patient denies major medical changes since last seen by Dr. Guerrero. 8/2/19.     Past Medical History:   Diagnosis Date   • Elbow fracture     right   • Osteopenia     Per DEXA     Past Surgical History:   Procedure Laterality Date   • Breast surgery  06/2014    reduction   • Colonoscopy  06/14/2011   • Colonoscopy  05/23/2017    Repeat in 5 years   • Hysterectomy  07/07/2009   • Knee surgery  1986    right   • Occult blood test tube  02/06/2012       ALLERGIES:   Allergen Reactions   • Penicillins RASH       Family History   Problem Relation Age of Onset   • Cancer Mother         colon   • Heart disease Mother    • Stroke Father        Review of Systems  Patient denies nausea and vomiting, tolerating oral intake    Objective:     Visit Vitals  /76   Pulse 51   Temp 97.4 °F (36.3 °C) (Temporal)   Resp 16   Ht 5' 5\" (1.651 m)   Wt 76.8 kg   SpO2 99%   BMI 28.18 kg/m²       General: alert, appears stated age and cooperative  Neck: No masses or lymphadenopathy, No thyromegaly and Trachea is midline  Pulmonary: CTA Bilat. No w/r/r  Heart: regular rate and rhythm, S1, S2 normal, no murmur, click, rub or gallop  Extremities: No edema BLE. Full ROM all 4 ext.   Psychiatric: Normal affect.  Skin: Warm and dry.    Labs Reviewed    I have independently  visualized and interpreted all images.    Imaging:   US Parathyroid:   Closely associated with the lower pole of the right lobe of thyroid gland and external to the thyroid gland is a 7 x 6 x 6 mm hypoechoic nodule with increased vascularity most suggestive of failure parathyroid adenoma in the given clinical setting of hypercalcemia and increased parathormone levels.     A small hypoechoic nodule is seen in the inferior pole of left lobe of thyroid gland with the maximal diameter of 7 mm and the thyroid gland score of 4. Continued follow-up is recommended. No other nodule is seen in the  neck. There is no adenopathy in the neck.    4D Parathyroid CT:   IMPRESSION:  A parathyroid adenoma is seen on the right side posterior and slightly medial to the lower lobe of thyroid gland just a few millimeters medially to the right common carotid artery as detailed above. This lesion demonstrates classical enhancement pattern.     An additional soft tissue lesion is seen on the left side posterior and medial to the inferior pole of left lobe of the thyroid gland, which also demonstrates classical enhancement pattern of a parathyroid adenoma. In addition the lesion seen on the ultrasound within the lower pole of the thyroid gland is not visualized on CT. Hence this lesion is also highly suspicious for a second parathyroid adenoma.      Assessment/Plan:     Nely Arevalo is a 55 year old female with primary hyperparathyroidism. Indications for surgical intervention include bone loss, elevated urinary calcium and relatively young age. Parathyroid US suggests right inferior parathyroid adenoma. I reviewed the H&P, I examined the patient, and there are no changes in the patient's condition. Proceed with parathyroidectomy as planned.      Delores Ruff PA-C     Alert and oriented to person, place and time

## 2022-08-13 PROCEDURE — 71046 X-RAY EXAM CHEST 2 VIEWS: CPT | Mod: 26

## 2022-08-13 RX ORDER — LIDOCAINE 4 G/100G
1 CREAM TOPICAL
Qty: 30 | Refills: 0
Start: 2022-08-13 | End: 2022-09-11

## 2022-08-13 RX ORDER — KETOROLAC TROMETHAMINE 30 MG/ML
15 SYRINGE (ML) INJECTION ONCE
Refills: 0 | Status: DISCONTINUED | OUTPATIENT
Start: 2022-08-13 | End: 2022-08-13

## 2022-08-13 RX ADMIN — Medication 15 MILLIGRAM(S): at 00:56

## 2022-08-14 LAB
CULTURE RESULTS: SIGNIFICANT CHANGE UP
SPECIMEN SOURCE: SIGNIFICANT CHANGE UP

## 2022-08-15 ENCOUNTER — NON-APPOINTMENT (OUTPATIENT)
Age: 37
End: 2022-08-15

## 2022-08-15 NOTE — ED POST DISCHARGE NOTE - RESULT SUMMARY
+ UCX- STREP. AGALACTICAE- [facility] PATIENT: MULTIPLE ABX ALLERGIES AND INTOLERANCES. LEFT MESSAGE WITH JAYY, THE ASSISTANT TO MD TO CALL LAB AND GET REPORT AND SHOW TO PMD. MAY NEED TO RETURN TO ED. + UCX- STREP. AGALACTICAE- [facility] PATIENT: MULTIPLE ABX ALLERGIES AND INTOLERANCES. SPOKE WITH JAYY, THE ASSISTANT TO MD TO CALL LAB AND GET REPORT AND SHOW TO PMD. MAY NEED TO RETURN TO ED.

## 2022-08-17 ENCOUNTER — OUTPATIENT (OUTPATIENT)
Dept: OUTPATIENT SERVICES | Facility: HOSPITAL | Age: 37
LOS: 1 days | Discharge: HOME | End: 2022-08-17

## 2022-08-17 ENCOUNTER — APPOINTMENT (OUTPATIENT)
Dept: INTERNAL MEDICINE | Facility: CLINIC | Age: 37
End: 2022-08-17

## 2022-08-17 ENCOUNTER — NON-APPOINTMENT (OUTPATIENT)
Age: 37
End: 2022-08-17

## 2022-08-17 ENCOUNTER — RESULT CHARGE (OUTPATIENT)
Age: 37
End: 2022-08-17

## 2022-08-17 VITALS
WEIGHT: 166 LBS | BODY MASS INDEX: 28.34 KG/M2 | TEMPERATURE: 97.3 F | OXYGEN SATURATION: 100 % | SYSTOLIC BLOOD PRESSURE: 136 MMHG | DIASTOLIC BLOOD PRESSURE: 86 MMHG | HEIGHT: 64 IN | HEART RATE: 101 BPM

## 2022-08-17 LAB
GLUCOSE BLDC GLUCOMTR-MCNC: 230
GLUCOSE BLDC GLUCOMTR-MCNC: 230 MG/DL — HIGH (ref 70–99)

## 2022-08-17 PROCEDURE — 99214 OFFICE O/P EST MOD 30 MIN: CPT | Mod: GC

## 2022-08-17 NOTE — REVIEW OF SYSTEMS
[Fever] : no fever [Chills] : no chills [Night Sweats] : no night sweats [Redness] : no redness [Vision Problems] : no vision problems [Itching] : no itching [Earache] : no earache [Nasal Discharge] : no nasal discharge [Sore Throat] : no sore throat [Chest Pain] : no chest pain [Palpitations] : palpitations [Wheezing] : no wheezing [Cough] : no cough [Abdominal Pain] : no abdominal pain [Nausea] : no nausea [Diarrhea] : diarrhea [Vomiting] : no vomiting [Itching] : no itching [Skin Rash] : no skin rash [Headache] : headache [Dizziness] : dizziness [Confusion] : no confusion [Memory Loss] : no memory loss [Anxiety] : anxiety [Negative] : Genitourinary [FreeTextEntry3] : ~ 1 month

## 2022-08-17 NOTE — ASSESSMENT
[FreeTextEntry1] : \par \par \par \par #Hx of Palpitations, Tachycardia\par # Hx of anxiety \par \par - f/u with psych\par - C/w Metoprolol 50 mg BID and amitriptyline \par \par # DM Type I\par - Uncontrolled, consistently >200, self injects insulin PRN \par - Follows with Endo\par - Follows with Ophtho, Podiatry\par - C/w insulin pump\par \par #Hx of Migraines\par - Well controlled with NSAIDs as needed\par \par # Hx of tremors:\par - Following with neuro\par - c/w cyclobenzaprine and toprol\par \par #GERD\par - C/w Famotidine\par \par #Chronic pelvic pain, vulvodynia\par - Was following with Uro gyn, no longer following due to no relief offered \par - C/w Amitriptyline, Flexeril\par \par #HCM\par - Pap Smear 5/10/22,\par \par

## 2022-08-17 NOTE — PHYSICAL EXAM
[Well Nourished] : well nourished [Well Developed] : well developed [Well-Appearing] : well-appearing [Normal Sclera/Conjunctiva] : normal sclera/conjunctiva [Normal Outer Ear/Nose] : the outer ears and nose were normal in appearance [Supple] : supple [No Respiratory Distress] : no respiratory distress  [No Accessory Muscle Use] : no accessory muscle use [Clear to Auscultation] : lungs were clear to auscultation bilaterally [Regular Rhythm] : with a regular rhythm [Normal S1, S2] : normal S1 and S2 [No Edema] : there was no peripheral edema [Soft] : abdomen soft [Non-distended] : non-distended [No CVA Tenderness] : no CVA  tenderness [No Joint Swelling] : no joint swelling [No Rash] : no rash [No Focal Deficits] : no focal deficits [Alert and Oriented x3] : oriented to person, place, and time [de-identified] : mild distress [de-identified] : tachycardic [de-identified] : 2+ radial pulse bilaterally [de-identified] : suprapubic tenderness on palpation [de-identified] : Very anxious

## 2022-08-17 NOTE — HISTORY OF PRESENT ILLNESS
[FreeTextEntry1] :  follow-up visit [de-identified] : 38yo female with a past medical history of chronic pelvic kenna Type 1 DM with chronic polyneuropathy of the lower extremities on an insulin pump, recurrent UTIs, PCOS (elevated testosterone level of 66), GERD, anxiety, and migraines who presents for a follow up visit.   recently treated in the ED for  UTI and anxiety. \par She is currently still feeling very anxious, has appt to see her psychiatrist in 2 days.\par \par She follows routinely with Endocrine, Ophtho, and Podiatry. Also follows w/ psych for anxiety.\par \par Was previously following with Uro-gyn for vulvodynia, but stopped following due to no therapies or treatment being offered. Was recommend to go to [place] to a vulvodynia specialist, however, cannot go there due to transportation issues because she lives in group home.

## 2022-08-19 ENCOUNTER — APPOINTMENT (OUTPATIENT)
Dept: PSYCHIATRY | Facility: CLINIC | Age: 37
End: 2022-08-19

## 2022-08-22 DIAGNOSIS — F41.9 ANXIETY DISORDER, UNSPECIFIED: ICD-10-CM

## 2022-08-22 DIAGNOSIS — Z00.00 ENCOUNTER FOR GENERAL ADULT MEDICAL EXAMINATION WITHOUT ABNORMAL FINDINGS: ICD-10-CM

## 2022-08-22 DIAGNOSIS — I10 ESSENTIAL (PRIMARY) HYPERTENSION: ICD-10-CM

## 2022-08-22 DIAGNOSIS — R51.9 HEADACHE, UNSPECIFIED: ICD-10-CM

## 2022-08-23 ENCOUNTER — APPOINTMENT (OUTPATIENT)
Dept: OBGYN | Facility: CLINIC | Age: 37
End: 2022-08-23

## 2022-08-25 ENCOUNTER — APPOINTMENT (OUTPATIENT)
Dept: NEUROLOGY | Facility: CLINIC | Age: 37
End: 2022-08-25

## 2022-08-25 DIAGNOSIS — M54.16 RADICULOPATHY, LUMBAR REGION: ICD-10-CM

## 2022-08-25 PROCEDURE — 95911 NRV CNDJ TEST 9-10 STUDIES: CPT

## 2022-08-25 PROCEDURE — 95886 MUSC TEST DONE W/N TEST COMP: CPT

## 2022-08-28 NOTE — ED PROVIDER NOTE - ATTENDING CONTRIBUTION TO CARE
35 y/o f w/ pmhx of Type I DM (on an insulin pump complicated by Diabetic neuropathy , HTN, chronic back pain, migraine headaches, (+) COVID, admission from 12/231-1/12 for DKA and abd pain present today for 35 y/o f w/ pmhx of Type I DM (on an insulin pump complicated by Diabetic neuropathy , HTN, chronic back pain, migraine headaches, (+) COVID, admission from 12/231-1/12 for DKA and abd pain presents today for syncope x 2 today, mom was with pt and caught pt first time, second time was in bathroom getting dressed and felt LH and weak again, and passed out, did not hit head, last few seconds, witnessed, no seizure like activity, no urinary or bowel incontinence, no seizure like activity, no tongue biting.   Has similar episode during admission x3. Pt reports mid sternal chest pain, sharp, intermittent, non radiating, mild in intensity, no alleviating or precipitating factors, associated with sob , worse with deep breaths. Had this pain during admission as well. Pt still with the bad pain she also had during admission, lower, constant, dull, non-radiating, no alleviating or precipitating factors.   denies fever, chills, n/v, palpitations, diaphoresis, cough, ear pain, hearing loss, neck pain/stiffness, back pain, photophobia/phonophobia, blurry vision/visual changes, diarrhea, constipation, melena/brbpr, urinary symptoms, numbness/tingling, recent travel or rash.     on exam:   Constitutional: wdwn pt sitting on stretcher in nad.  Skin: no rash, no signs of trauma:  HEENT: PERRL, EOM intact, no nystagmus, mmm. No tongue deviation.   NECK and BACK: neck supple, no spinous ttp to neck or back, FROM, no palpable shelves or step offs, no meningeal signs.  CARDIO: regular rate, radial pulses 2/4 b/l, dp and pt pulses 2/4 b/l.  Lungs: Ctabl w/ breath sounds present b/l, no wheezing or crackles, no accessory muscle use, no tachypnea, no stridor  ABD: BS present throughout all 4 quadrants, abd soft, nd, nt, no rebound tenderness or guarding, no cvat,;  EXT: FROM of upper and lower ext, no drift, no calf pain/swelling/erythema.  NEURO: AAOx3. Motor 5/5 and sensation intact throughout upper and lower ext. CN II-XII intact. No facial droop or slurring of speech. (-) Pronator (NIHSS O. <-- Click to add NO pertinent Past Medical History

## 2022-08-29 NOTE — ED ADULT NURSE NOTE - ASSOCIATED SYMPTOMS
Last refill 1/14/22 #90 x1  LOV 12/27/21 f/u appt  NOV not scheduled  Lipid 12/27/21    Please advise.   was seen yesterday and dx w/ gastritid

## 2022-08-31 ENCOUNTER — APPOINTMENT (OUTPATIENT)
Dept: PSYCHIATRY | Facility: CLINIC | Age: 37
End: 2022-08-31

## 2022-08-31 ENCOUNTER — OUTPATIENT (OUTPATIENT)
Dept: OUTPATIENT SERVICES | Facility: HOSPITAL | Age: 37
LOS: 1 days | Discharge: HOME | End: 2022-08-31

## 2022-08-31 DIAGNOSIS — F41.1 GENERALIZED ANXIETY DISORDER: ICD-10-CM

## 2022-09-02 ENCOUNTER — APPOINTMENT (OUTPATIENT)
Dept: PSYCHIATRY | Facility: CLINIC | Age: 37
End: 2022-09-02

## 2022-09-02 ENCOUNTER — OUTPATIENT (OUTPATIENT)
Dept: OUTPATIENT SERVICES | Facility: HOSPITAL | Age: 37
LOS: 1 days | Discharge: HOME | End: 2022-09-02

## 2022-09-02 DIAGNOSIS — F41.1 GENERALIZED ANXIETY DISORDER: ICD-10-CM

## 2022-09-02 PROCEDURE — 99214 OFFICE O/P EST MOD 30 MIN: CPT | Mod: 95

## 2022-09-07 ENCOUNTER — NON-APPOINTMENT (OUTPATIENT)
Age: 37
End: 2022-09-07

## 2022-09-08 ENCOUNTER — NON-APPOINTMENT (OUTPATIENT)
Age: 37
End: 2022-09-08

## 2022-09-09 ENCOUNTER — APPOINTMENT (OUTPATIENT)
Dept: GASTROENTEROLOGY | Facility: CLINIC | Age: 37
End: 2022-09-09

## 2022-09-09 VITALS
SYSTOLIC BLOOD PRESSURE: 144 MMHG | WEIGHT: 177.4 LBS | OXYGEN SATURATION: 99 % | BODY MASS INDEX: 30.29 KG/M2 | DIASTOLIC BLOOD PRESSURE: 94 MMHG | HEART RATE: 95 BPM | HEIGHT: 64 IN

## 2022-09-09 PROCEDURE — XXXXX: CPT | Mod: 1L

## 2022-09-09 NOTE — ASSESSMENT
[FreeTextEntry1] : Will proceed with EGD after discussing with Dr Galvez (endocrine) and Dr Robertson

## 2022-09-12 ENCOUNTER — APPOINTMENT (OUTPATIENT)
Dept: INTERNAL MEDICINE | Facility: CLINIC | Age: 37
End: 2022-09-12

## 2022-09-15 ENCOUNTER — RESULT REVIEW (OUTPATIENT)
Age: 37
End: 2022-09-15

## 2022-09-15 ENCOUNTER — OUTPATIENT (OUTPATIENT)
Dept: OUTPATIENT SERVICES | Facility: HOSPITAL | Age: 37
LOS: 1 days | Discharge: HOME | End: 2022-09-15

## 2022-09-15 DIAGNOSIS — R10.2 PELVIC AND PERINEAL PAIN: ICD-10-CM

## 2022-09-15 PROCEDURE — 76856 US EXAM PELVIC COMPLETE: CPT | Mod: 26

## 2022-09-19 ENCOUNTER — APPOINTMENT (OUTPATIENT)
Dept: OBGYN | Facility: CLINIC | Age: 37
End: 2022-09-19

## 2022-09-19 VITALS — SYSTOLIC BLOOD PRESSURE: 122 MMHG | HEIGHT: 64 IN | DIASTOLIC BLOOD PRESSURE: 80 MMHG

## 2022-09-19 DIAGNOSIS — N76.2 ACUTE VULVITIS: ICD-10-CM

## 2022-09-19 DIAGNOSIS — Z87.42 PERSONAL HISTORY OF OTHER DISEASES OF THE FEMALE GENITAL TRACT: ICD-10-CM

## 2022-09-19 DIAGNOSIS — L29.2 PRURITUS VULVAE: ICD-10-CM

## 2022-09-19 PROCEDURE — 99214 OFFICE O/P EST MOD 30 MIN: CPT

## 2022-09-19 NOTE — PHYSICAL EXAM
[Appropriately responsive] : appropriately responsive [Alert] : alert [No Acute Distress] : no acute distress [No Lymphadenopathy] : no lymphadenopathy [Regular Rate Rhythm] : regular rate rhythm [No Murmurs] : no murmurs [Clear to Auscultation B/L] : clear to auscultation bilaterally [Soft] : soft [Non-tender] : non-tender [Non-distended] : non-distended [No HSM] : No HSM [No Lesions] : no lesions [No Mass] : no mass [Oriented x3] : oriented x3 [Vulvitis] : vulvitis [Labia Majora] : normal [Labia Minora] : normal [Normal] : normal [Discharge] : a  ~M vaginal discharge was present

## 2022-09-19 NOTE — HISTORY OF PRESENT ILLNESS
[FreeTextEntry1] : Patient is 37 years old para 0-0-0-0 last menstrual period September 10, 2022.\par Patient complains of vaginal discharge with vulvar irritation and inflammation.\par Pelvic ultrasound (transabdominal) performed on September 15, 2022 is noted and reviewed with the patient.

## 2022-09-19 NOTE — DISCUSSION/SUMMARY
[FreeTextEntry1] : B VV test done\par Prescribed Lotrisone cream with instructions/precautions\par Follow-up as needed\par

## 2022-09-20 DIAGNOSIS — N76.0 ACUTE VAGINITIS: ICD-10-CM

## 2022-09-20 DIAGNOSIS — B96.89 ACUTE VAGINITIS: ICD-10-CM

## 2022-09-21 ENCOUNTER — NON-APPOINTMENT (OUTPATIENT)
Age: 37
End: 2022-09-21

## 2022-09-23 ENCOUNTER — OUTPATIENT (OUTPATIENT)
Dept: OUTPATIENT SERVICES | Facility: HOSPITAL | Age: 37
LOS: 1 days | Discharge: HOME | End: 2022-09-23

## 2022-09-23 ENCOUNTER — APPOINTMENT (OUTPATIENT)
Dept: PSYCHIATRY | Facility: CLINIC | Age: 37
End: 2022-09-23

## 2022-09-23 DIAGNOSIS — F44.5 CONVERSION DISORDER WITH SEIZURES OR CONVULSIONS: ICD-10-CM

## 2022-09-23 DIAGNOSIS — F41.1 GENERALIZED ANXIETY DISORDER: ICD-10-CM

## 2022-09-23 PROCEDURE — 99213 OFFICE O/P EST LOW 20 MIN: CPT

## 2022-10-01 PROBLEM — F44.5 PSEUDOSEIZURE: Status: ACTIVE | Noted: 2022-02-23

## 2022-10-03 ENCOUNTER — NON-APPOINTMENT (OUTPATIENT)
Age: 37
End: 2022-10-03

## 2022-10-04 ENCOUNTER — EMERGENCY (EMERGENCY)
Facility: HOSPITAL | Age: 37
LOS: 0 days | Discharge: HOME | End: 2022-10-04
Attending: EMERGENCY MEDICINE | Admitting: EMERGENCY MEDICINE

## 2022-10-04 VITALS
TEMPERATURE: 99 F | SYSTOLIC BLOOD PRESSURE: 146 MMHG | OXYGEN SATURATION: 98 % | HEIGHT: 65 IN | RESPIRATION RATE: 17 BRPM | DIASTOLIC BLOOD PRESSURE: 88 MMHG | HEART RATE: 103 BPM

## 2022-10-04 DIAGNOSIS — Z28.310 UNVACCINATED FOR COVID-19: ICD-10-CM

## 2022-10-04 DIAGNOSIS — Z87.39 PERSONAL HISTORY OF OTHER DISEASES OF THE MUSCULOSKELETAL SYSTEM AND CONNECTIVE TISSUE: ICD-10-CM

## 2022-10-04 DIAGNOSIS — F32.5 MAJOR DEPRESSIVE DISORDER, SINGLE EPISODE, IN FULL REMISSION: ICD-10-CM

## 2022-10-04 DIAGNOSIS — Z20.822 CONTACT WITH AND (SUSPECTED) EXPOSURE TO COVID-19: ICD-10-CM

## 2022-10-04 DIAGNOSIS — Z88.7 ALLERGY STATUS TO SERUM AND VACCINE: ICD-10-CM

## 2022-10-04 DIAGNOSIS — E10.9 TYPE 1 DIABETES MELLITUS WITHOUT COMPLICATIONS: ICD-10-CM

## 2022-10-04 DIAGNOSIS — Z79.4 LONG TERM (CURRENT) USE OF INSULIN: ICD-10-CM

## 2022-10-04 DIAGNOSIS — Z88.8 ALLERGY STATUS TO OTHER DRUGS, MEDICAMENTS AND BIOLOGICAL SUBSTANCES STATUS: ICD-10-CM

## 2022-10-04 DIAGNOSIS — K21.9 GASTRO-ESOPHAGEAL REFLUX DISEASE WITHOUT ESOPHAGITIS: ICD-10-CM

## 2022-10-04 DIAGNOSIS — Z79.82 LONG TERM (CURRENT) USE OF ASPIRIN: ICD-10-CM

## 2022-10-04 DIAGNOSIS — Z96.41 PRESENCE OF INSULIN PUMP (EXTERNAL) (INTERNAL): ICD-10-CM

## 2022-10-04 DIAGNOSIS — R09.81 NASAL CONGESTION: ICD-10-CM

## 2022-10-04 DIAGNOSIS — F41.9 ANXIETY DISORDER, UNSPECIFIED: ICD-10-CM

## 2022-10-04 DIAGNOSIS — R05.8 OTHER SPECIFIED COUGH: ICD-10-CM

## 2022-10-04 DIAGNOSIS — J06.9 ACUTE UPPER RESPIRATORY INFECTION, UNSPECIFIED: ICD-10-CM

## 2022-10-04 DIAGNOSIS — Z86.16 PERSONAL HISTORY OF COVID-19: ICD-10-CM

## 2022-10-04 DIAGNOSIS — I10 ESSENTIAL (PRIMARY) HYPERTENSION: ICD-10-CM

## 2022-10-04 DIAGNOSIS — Z88.0 ALLERGY STATUS TO PENICILLIN: ICD-10-CM

## 2022-10-04 DIAGNOSIS — Z88.1 ALLERGY STATUS TO OTHER ANTIBIOTIC AGENTS STATUS: ICD-10-CM

## 2022-10-04 LAB
FLUAV AG NPH QL: SIGNIFICANT CHANGE UP
FLUBV AG NPH QL: SIGNIFICANT CHANGE UP
RSV RNA NPH QL NAA+NON-PROBE: SIGNIFICANT CHANGE UP
SARS-COV-2 RNA SPEC QL NAA+PROBE: SIGNIFICANT CHANGE UP

## 2022-10-04 PROCEDURE — 71046 X-RAY EXAM CHEST 2 VIEWS: CPT | Mod: 26

## 2022-10-04 PROCEDURE — 99284 EMERGENCY DEPT VISIT MOD MDM: CPT

## 2022-10-04 RX ORDER — ALBUTEROL 90 UG/1
2 AEROSOL, METERED ORAL
Qty: 18 | Refills: 0
Start: 2022-10-04 | End: 2022-10-10

## 2022-10-04 NOTE — ED PROVIDER NOTE - ATTENDING APP SHARED VISIT CONTRIBUTION OF CARE
I personally evaluated the patient. I reviewed the Resident’s or Physician Assistant’s note (as assigned above), and agree with the findings and plan except as documented in my note.  37-year-old female past medical history significant for hypertension, diabetes (on insulin pump), anxiety/depression, GERD sent to the ED from [facility] residence with cough for 3 days.  Cough is nonproductive.  Positive chest pain with coughing.  No fever, chills.  No shortness of breath or dyspnea on exertion.  No nasal congestion, sore throat, headache.  No myalgias or joint pains.  No rash.  No nausea, vomiting or diarrhea.  Patient tolerating p.o. well.  No abdominal pain.  Patient is unvaccinated.  Vitals noted.  CONSTITUTIONAL: Well-appearing; well-nourished; in no apparent distress.   HEAD: Normocephalic; atraumatic.   EYES: PERRL; EOM intact. Conjunctiva normal B/L.   ENT: Normal pharynx with no tonsillar hypertrophy. MMM.  NECK: Supple; non-tender; no cervical lymphadenopathy.   CHEST: Normal chest excursion with respiration.   CARDIOVASCULAR: Normal S1, S2; no murmurs, rubs, or gallops.   RESPIRATORY: + Rhonchi B/L. L>R. No wheezing.   GI/: Normal bowel sounds; non-distended; non-tender.  BACK: No evidence of trauma or deformity. Non-tender to palpation. No CVA tenderness.   NEURO: A & O x 4; CN 2-12 intact. Grossly unremarkable.

## 2022-10-04 NOTE — ED PROVIDER NOTE - PHYSICAL EXAMINATION
CONST: Well appearing in NAD  EYES: PERRL, EOMI, Sclera and conjunctiva clear.   ENT: Oropharynx normal appearing, no erythema or exudates. Uvula midline.  NECK: Non-tender, no meningeal signs  CARD: Normal S1 S2; Normal rate and rhythm  RESP scattered rhonchi. Mostly left. No wheezing  GI: Soft, non-tender, non-distended.  MS: Normal ROM in all extremities. No midline spinal tenderness.  SKIN: Warm, dry, no acute rashes. Good turgor  NEURO: A&Ox3, No focal deficits. Strength 5/5 with no sensory deficits. Steady gait

## 2022-10-04 NOTE — ED PROVIDER NOTE - NS ED ROS FT
CONST: No fever, chills or bodyaches  EYES: No pain, redness, drainage or visual changes.  ENT: No ear pain or discharge,(+) nasal discharge congestion. No sore throat  CARD: No chest pain, palpitations  RESP: No SOB, (+)cough, No hemoptysis. No hx of asthma or COPD  GI: No abdominal pain, N/V/D  MS: No joint pain, back pain or extremity pain/injury  SKIN: No rashes  NEURO: No headache, dizziness, paresthesias or LOC

## 2022-10-04 NOTE — ED PROVIDER NOTE - OBJECTIVE STATEMENT
Pt with hx of DM, depression, GERD presents with 3 days of dry cough, nasal congestion. No fever, chills or SOB. Denies hx of asthma. Pt had Covid 10 months ago. Not vaccinated. Denies CP, leg pain or swelling

## 2022-10-04 NOTE — ED ADULT TRIAGE NOTE - HEIGHT IN FEET
[de-identified] : 5

## 2022-10-04 NOTE — ED ADULT TRIAGE NOTE - CHIEF COMPLAINT QUOTE
Pt BIBA from [facility] residence for cough and wheezing x 3 days. denies fevers

## 2022-10-04 NOTE — ED ADULT NURSE NOTE - OBJECTIVE STATEMENT
Pt BIBA from [facility] residence for cough and wheezing x 3 days. denies fevers

## 2022-10-04 NOTE — ED PROVIDER NOTE - CLINICAL SUMMARY MEDICAL DECISION MAKING FREE TEXT BOX
37-year-old female past medical history as documented with 3 days of cough.  Chest x-ray with no acute pathology. patient improved with symptomatic treatment in the ED.  Patient discharged back to [facility] with strict return instructions.

## 2022-10-04 NOTE — ED PROVIDER NOTE - PATIENT PORTAL LINK FT
You can access the FollowMyHealth Patient Portal offered by [facility] by registering at the following website: [email or web address]. By joining [facility]’s FollowMyHealth portal, you will also be able to view your health information using other applications (apps) compatible with our system.

## 2022-10-07 ENCOUNTER — NON-APPOINTMENT (OUTPATIENT)
Age: 37
End: 2022-10-07

## 2022-10-07 ENCOUNTER — APPOINTMENT (OUTPATIENT)
Dept: PSYCHIATRY | Facility: CLINIC | Age: 37
End: 2022-10-07

## 2022-10-13 ENCOUNTER — OUTPATIENT (OUTPATIENT)
Dept: OUTPATIENT SERVICES | Facility: HOSPITAL | Age: 37
LOS: 1 days | Discharge: HOME | End: 2022-10-13

## 2022-10-13 ENCOUNTER — APPOINTMENT (OUTPATIENT)
Dept: INTERNAL MEDICINE | Facility: CLINIC | Age: 37
End: 2022-10-13

## 2022-10-13 VITALS
OXYGEN SATURATION: 98 % | WEIGHT: 171 LBS | HEIGHT: 64 IN | HEART RATE: 105 BPM | TEMPERATURE: 96.5 F | BODY MASS INDEX: 29.19 KG/M2 | SYSTOLIC BLOOD PRESSURE: 134 MMHG | DIASTOLIC BLOOD PRESSURE: 95 MMHG

## 2022-10-13 DIAGNOSIS — J06.9 ACUTE UPPER RESPIRATORY INFECTION, UNSPECIFIED: ICD-10-CM

## 2022-10-13 PROCEDURE — 99213 OFFICE O/P EST LOW 20 MIN: CPT | Mod: GC

## 2022-10-13 NOTE — PLAN
[FreeTextEntry1] : #Post viral Cough\par - 2 weeks of dry cough, vitals stable\par - RVP at ED negative\par - Chest xray at ED negative\par - Took a week of Benzonatate\par - C/w Albuterol\par - Prescribed Promethazine PRN this visit\par - F/u with Dr. Robertson or as needed\par

## 2022-10-13 NOTE — ED PROCEDURE NOTE - NS ED PROC PERFORMED BY1 FT
[facility] on [address] [place] [address] is closed and patient would like it sent to [facility] on [address] [place] [address].
Rx sent to [facility]

## 2022-10-13 NOTE — ASSESSMENT
[FreeTextEntry1] : 37 year old female with a past medical history of chronic pelvic pain, Type 1 DM with chronic polyneuropathy of the lower extremities on an insulin pump, recurrent UTIs, PCOS, GERD, anxiety, and migraines who presents for a cough of 2 weeks associated with mild SOB and chest pain. No fever, no palpitations, no sputum. Patient went to ED on Oct 4 and vitals were stable, chest xray negative, RVP negative, she was discharged on cough medications and albuterol. No abx given.

## 2022-10-13 NOTE — REVIEW OF SYSTEMS
[Hoarseness] : hoarseness [Shortness Of Breath] : shortness of breath [Cough] : cough [Joint Pain] : joint pain [Headache] : headache [Anxiety] : anxiety [Negative] : Heme/Lymph [FreeTextEntry5] : Mild chest pain

## 2022-10-13 NOTE — HISTORY OF PRESENT ILLNESS
[FreeTextEntry1] : Cough [de-identified] : 37 year old female with a past medical history of chronic pelvic pain Type 1 DM with chronic polyneuropathy of the lower extremities on an insulin pump, recurrent UTIs, PCOS, GERD, anxiety, and migraines who presents for a cough of 2 weeks associated with mild SOB and chest pain. No fever, no palpitations, no sputum. Patient went to ED on Oct 4 and vitals were stable, chest xray negative, RVP negative, she was discharged on cough medications and albuterol. No abx given.

## 2022-10-13 NOTE — PHYSICAL EXAM
[Well Nourished] : well nourished [Well Developed] : well developed [Well-Appearing] : well-appearing [Normal Sclera/Conjunctiva] : normal sclera/conjunctiva [EOMI] : extraocular movements intact [Normal Outer Ear/Nose] : the outer ears and nose were normal in appearance [Normal Oropharynx] : the oropharynx was normal [No Lymphadenopathy] : no lymphadenopathy [Supple] : supple [No Respiratory Distress] : no respiratory distress  [No Accessory Muscle Use] : no accessory muscle use [Clear to Auscultation] : lungs were clear to auscultation bilaterally [Normal Rate] : normal rate  [Regular Rhythm] : with a regular rhythm [Normal S1, S2] : normal S1 and S2 [No Edema] : there was no peripheral edema [Soft] : abdomen soft [Non Tender] : non-tender [Non-distended] : non-distended [Normal Bowel Sounds] : normal bowel sounds [Normal Posterior Cervical Nodes] : no posterior cervical lymphadenopathy [Normal Anterior Cervical Nodes] : no anterior cervical lymphadenopathy [No CVA Tenderness] : no CVA  tenderness [No Joint Swelling] : no joint swelling [No Rash] : no rash [Coordination Grossly Intact] : coordination grossly intact [Normal Gait] : normal gait [Normal Affect] : the affect was normal [de-identified] : In mild distress

## 2022-10-14 ENCOUNTER — APPOINTMENT (OUTPATIENT)
Dept: PSYCHIATRY | Facility: CLINIC | Age: 37
End: 2022-10-14

## 2022-10-14 ENCOUNTER — NON-APPOINTMENT (OUTPATIENT)
Age: 37
End: 2022-10-14

## 2022-10-14 DIAGNOSIS — J06.9 ACUTE UPPER RESPIRATORY INFECTION, UNSPECIFIED: ICD-10-CM

## 2022-10-18 ENCOUNTER — APPOINTMENT (OUTPATIENT)
Dept: ENDOCRINOLOGY | Facility: CLINIC | Age: 37
End: 2022-10-18

## 2022-10-18 VITALS
HEIGHT: 64 IN | DIASTOLIC BLOOD PRESSURE: 82 MMHG | WEIGHT: 180 LBS | OXYGEN SATURATION: 98 % | HEART RATE: 85 BPM | BODY MASS INDEX: 30.73 KG/M2 | SYSTOLIC BLOOD PRESSURE: 122 MMHG | TEMPERATURE: 97.2 F

## 2022-10-18 DIAGNOSIS — E55.9 VITAMIN D DEFICIENCY, UNSPECIFIED: ICD-10-CM

## 2022-10-18 PROCEDURE — 99215 OFFICE O/P EST HI 40 MIN: CPT

## 2022-10-18 RX ORDER — PEN NEEDLE, DIABETIC 31 GX5/16"
NEEDLE, DISPOSABLE MISCELLANEOUS
Qty: 1 | Refills: 3 | Status: ACTIVE | COMMUNITY
Start: 2021-08-23 | End: 1900-01-01

## 2022-10-18 NOTE — PHYSICAL EXAM
[Alert] : alert [Well Nourished] : well nourished [Healthy Appearance] : healthy appearance [No Acute Distress] : no acute distress [No Proptosis] : no proptosis [No Lid Lag] : no lid lag [No LAD] : no lymphadenopathy [Thyroid Not Enlarged] : the thyroid was not enlarged [No Thyroid Nodules] : no palpable thyroid nodules [No Accessory Muscle Use] : no accessory muscle use [Clear to Auscultation] : lungs were clear to auscultation bilaterally [Normal S1, S2] : normal S1 and S2 [No Murmurs] : no murmurs [Normal Rate] : heart rate was normal [Soft] : abdomen soft [No CVA Tenderness] : no ~M costovertebral angle tenderness [No Stigmata of Cushings Syndrome] : no stigmata of Cushings Syndrome [Normal Reflexes] : deep tendon reflexes were 2+ and symmetric [Oriented x3] : oriented to person, place, and time [Abdominal Striae] : no abdominal striae [Acanthosis Nigricans] : no acanthosis nigricans [Hirsutism] : no hirsutism [de-identified] : [place] hump

## 2022-10-18 NOTE — ASSESSMENT
[Diabetes Foot Care] : diabetes foot care [Long Term Vascular Complications] : long term vascular complications of diabetes [Carbohydrate Consistent Diet] : carbohydrate consistent diet [Importance of Diet and Exercise] : importance of diet and exercise to improve glycemic control, achieve weight loss and improve cardiovascular health [Exercise/Effect on Glucose] : exercise/effect on glucose [Hypoglycemia Management] : hypoglycemia management [Action and use of Insulin] : action and use of short and long-acting insulin [Self Monitoring of Blood Glucose] : self monitoring of blood glucose [Insulin Self-Administration] : insulin self-administration [Injection Technique, Storage, Sharps Disposal] : injection technique, storage, and sharps disposal [FreeTextEntry1] : 37 year old patient with type 1 DM on insulin pump presents for  follow up \par \par \par #poorly controlled type 1 DM  on insulin pump Medtronic 630 G \par - log reviewed , pattern of hyperglycemia pre and post meals , attributed to recent infections but also to non compliance with diet , per mother and mario , she was not watching her carbs and \par - basal rate changed  \par 12am-6am : increased to 1.8  \par 6am -12pm : increased to 1.6 \par 12pm-12am increased to 2 u/hr \par - I: C ratio changed to 1: 10 from 1:12 and ISF 1: 75 \par - advised not to correct within 2 hours from taking insulin to avoid Stalking and hypoglycemia \par - uptodate with eye exam , has cataract \par - periods regular, has hirsutism total test ok following with GYn, might benefit from metformin in the future \par - patient now interested to use CGM but mom very anxious about it and prefer not to use it \par - she need better glucose control as she will undergo EGD and colonoscopy , aware of anemia \par - advised to keep her f/u with BT too \par \par \par  [Weight Loss] : weight loss

## 2022-10-18 NOTE — DATA REVIEWED
[FreeTextEntry1] : previous data and record reviewed \par 1/15/21:  hba1c 8.7% glucose 203  LDL 116crea 0.7  AST 15  ALT 18  hb 13.6 \par 6/1/21: Hba1c 8.9% glucose 283  crea0.62  \par 10/4/21: HBA1c 10.8% glucose 190   TSH 1.310  ft4 1.22 ALT 56 \par \par 9/30/22: HBA1c 9.9%   hb 8.3 crea 0.63    alk phos 138  alb/crea  39  25 vit D 25.2 TSH 1.02

## 2022-10-18 NOTE — REVIEW OF SYSTEMS
[Palpitations] : palpitations [Depression] : depression [Anxiety] : anxiety [Stress] : stress [As Noted in HPI] : as noted in HPI [Fatigue] : no fatigue [Decreased Appetite] : appetite not decreased [Recent Weight Gain (___ Lbs)] : no recent weight gain [Recent Weight Loss (___ Lbs)] : no recent weight loss [Visual Field Defect] : no visual field defect [Blurred Vision] : no blurred vision [Dysphagia] : no dysphagia [Dysphonia] : no dysphonia [Shortness Of Breath] : no shortness of breath [Cough] : no cough [Orthopnea] : no orthopnea [PND] : no Paroxysmal Nocturnal Dyspnea [Nausea] : no nausea [Constipation] : no constipation [Vomiting] : no vomiting [Diarrhea] : no diarrhea [Nocturia] : no nocturia [Acne] : no acne [Acanthosis] : no acanthosis  [Dry Skin] : no dry skin [Hirsutism] : no hirsutism [Headaches] : no headaches [Tremors] : no tremors [Suicidal Ideation] : no suicidal ideation [Cold Intolerance] : no cold intolerance [Heat Intolerance] : no heat intolerance

## 2022-10-19 ENCOUNTER — NON-APPOINTMENT (OUTPATIENT)
Age: 37
End: 2022-10-19

## 2022-10-20 ENCOUNTER — EMERGENCY (EMERGENCY)
Facility: HOSPITAL | Age: 37
LOS: 0 days | Discharge: NURSING, REHAB OR LONG-TERM CARE | End: 2022-10-21
Attending: EMERGENCY MEDICINE | Admitting: EMERGENCY MEDICINE

## 2022-10-20 VITALS
OXYGEN SATURATION: 99 % | RESPIRATION RATE: 18 BRPM | SYSTOLIC BLOOD PRESSURE: 142 MMHG | HEART RATE: 122 BPM | HEIGHT: 65 IN | DIASTOLIC BLOOD PRESSURE: 74 MMHG | TEMPERATURE: 100 F

## 2022-10-20 DIAGNOSIS — Z96.41 PRESENCE OF INSULIN PUMP (EXTERNAL) (INTERNAL): ICD-10-CM

## 2022-10-20 DIAGNOSIS — Z88.7 ALLERGY STATUS TO SERUM AND VACCINE: ICD-10-CM

## 2022-10-20 DIAGNOSIS — R05.8 OTHER SPECIFIED COUGH: ICD-10-CM

## 2022-10-20 DIAGNOSIS — R07.9 CHEST PAIN, UNSPECIFIED: ICD-10-CM

## 2022-10-20 DIAGNOSIS — Z88.0 ALLERGY STATUS TO PENICILLIN: ICD-10-CM

## 2022-10-20 DIAGNOSIS — R50.9 FEVER, UNSPECIFIED: ICD-10-CM

## 2022-10-20 DIAGNOSIS — E10.40 TYPE 1 DIABETES MELLITUS WITH DIABETIC NEUROPATHY, UNSPECIFIED: ICD-10-CM

## 2022-10-20 DIAGNOSIS — K21.9 GASTRO-ESOPHAGEAL REFLUX DISEASE WITHOUT ESOPHAGITIS: ICD-10-CM

## 2022-10-20 DIAGNOSIS — Z79.82 LONG TERM (CURRENT) USE OF ASPIRIN: ICD-10-CM

## 2022-10-20 DIAGNOSIS — M51.34 OTHER INTERVERTEBRAL DISC DEGENERATION, THORACIC REGION: ICD-10-CM

## 2022-10-20 DIAGNOSIS — Z88.8 ALLERGY STATUS TO OTHER DRUGS, MEDICAMENTS AND BIOLOGICAL SUBSTANCES: ICD-10-CM

## 2022-10-20 DIAGNOSIS — F32.9 MAJOR DEPRESSIVE DISORDER, SINGLE EPISODE, UNSPECIFIED: ICD-10-CM

## 2022-10-20 DIAGNOSIS — Z79.4 LONG TERM (CURRENT) USE OF INSULIN: ICD-10-CM

## 2022-10-20 DIAGNOSIS — Z88.1 ALLERGY STATUS TO OTHER ANTIBIOTIC AGENTS STATUS: ICD-10-CM

## 2022-10-20 DIAGNOSIS — Z87.440 PERSONAL HISTORY OF URINARY (TRACT) INFECTIONS: ICD-10-CM

## 2022-10-20 DIAGNOSIS — U07.1 COVID-19: ICD-10-CM

## 2022-10-20 LAB
ALBUMIN SERPL ELPH-MCNC: 4.3 G/DL — SIGNIFICANT CHANGE UP (ref 3.5–5.2)
ALP SERPL-CCNC: 172 U/L — HIGH (ref 30–115)
ALT FLD-CCNC: 31 U/L — SIGNIFICANT CHANGE UP (ref 0–41)
ANION GAP SERPL CALC-SCNC: 12 MMOL/L — SIGNIFICANT CHANGE UP (ref 7–14)
ANISOCYTOSIS BLD QL: SIGNIFICANT CHANGE UP
APPEARANCE UR: CLEAR — SIGNIFICANT CHANGE UP
AST SERPL-CCNC: 20 U/L — SIGNIFICANT CHANGE UP (ref 0–41)
B-OH-BUTYR SERPL-SCNC: <0.2 MMOL/L — SIGNIFICANT CHANGE UP
BASE EXCESS BLDV CALC-SCNC: -0.2 MMOL/L — SIGNIFICANT CHANGE UP (ref -2–3)
BASOPHILS # BLD AUTO: 0 K/UL — SIGNIFICANT CHANGE UP (ref 0–0.2)
BASOPHILS NFR BLD AUTO: 0 % — SIGNIFICANT CHANGE UP (ref 0–1)
BILIRUB SERPL-MCNC: <0.2 MG/DL — SIGNIFICANT CHANGE UP (ref 0.2–1.2)
BILIRUB UR-MCNC: NEGATIVE — SIGNIFICANT CHANGE UP
BUN SERPL-MCNC: 9 MG/DL — LOW (ref 10–20)
CA-I SERPL-SCNC: 1.12 MMOL/L — LOW (ref 1.15–1.33)
CALCIUM SERPL-MCNC: 8.8 MG/DL — SIGNIFICANT CHANGE UP (ref 8.4–10.4)
CHLORIDE SERPL-SCNC: 96 MMOL/L — LOW (ref 98–110)
CO2 SERPL-SCNC: 25 MMOL/L — SIGNIFICANT CHANGE UP (ref 17–32)
COLOR SPEC: SIGNIFICANT CHANGE UP
CREAT SERPL-MCNC: 0.6 MG/DL — LOW (ref 0.7–1.5)
DIFF PNL FLD: NEGATIVE — SIGNIFICANT CHANGE UP
EGFR: 118 ML/MIN/1.73M2 — SIGNIFICANT CHANGE UP
EOSINOPHIL # BLD AUTO: 0.16 K/UL — SIGNIFICANT CHANGE UP (ref 0–0.7)
EOSINOPHIL NFR BLD AUTO: 1.8 % — SIGNIFICANT CHANGE UP (ref 0–8)
GAS PNL BLDV: 132 MMOL/L — LOW (ref 136–145)
GAS PNL BLDV: SIGNIFICANT CHANGE UP
GIANT PLATELETS BLD QL SMEAR: PRESENT — SIGNIFICANT CHANGE UP
GLUCOSE SERPL-MCNC: 356 MG/DL — HIGH (ref 70–99)
GLUCOSE UR QL: ABNORMAL
HCO3 BLDV-SCNC: 25 MMOL/L — SIGNIFICANT CHANGE UP (ref 22–29)
HCT VFR BLD CALC: 32.6 % — LOW (ref 37–47)
HCT VFR BLDA CALC: 25 % — LOW (ref 39–51)
HGB BLD CALC-MCNC: 8.4 G/DL — LOW (ref 12.6–17.4)
HGB BLD-MCNC: 8.9 G/DL — LOW (ref 12–16)
HYPOCHROMIA BLD QL: SIGNIFICANT CHANGE UP
KETONES UR-MCNC: NEGATIVE — SIGNIFICANT CHANGE UP
LACTATE BLDV-MCNC: 0.9 MMOL/L — SIGNIFICANT CHANGE UP (ref 0.5–2)
LACTATE SERPL-SCNC: 2.1 MMOL/L — HIGH (ref 0.7–2)
LEUKOCYTE ESTERASE UR-ACNC: NEGATIVE — SIGNIFICANT CHANGE UP
LIDOCAIN IGE QN: 19 U/L — SIGNIFICANT CHANGE UP (ref 7–60)
LYMPHOCYTES # BLD AUTO: 0.84 K/UL — LOW (ref 1.2–3.4)
LYMPHOCYTES # BLD AUTO: 9.7 % — LOW (ref 20.5–51.1)
MANUAL SMEAR VERIFICATION: SIGNIFICANT CHANGE UP
MCHC RBC-ENTMCNC: 18.2 PG — LOW (ref 27–31)
MCHC RBC-ENTMCNC: 27.3 G/DL — LOW (ref 32–37)
MCV RBC AUTO: 66.7 FL — LOW (ref 81–99)
MICROCYTES BLD QL: SIGNIFICANT CHANGE UP
MONOCYTES # BLD AUTO: 0.69 K/UL — HIGH (ref 0.1–0.6)
MONOCYTES NFR BLD AUTO: 8 % — SIGNIFICANT CHANGE UP (ref 1.7–9.3)
NEUTROPHILS # BLD AUTO: 6.94 K/UL — HIGH (ref 1.4–6.5)
NEUTROPHILS NFR BLD AUTO: 75.2 % — SIGNIFICANT CHANGE UP (ref 42.2–75.2)
NEUTS BAND # BLD: 5.3 % — SIGNIFICANT CHANGE UP (ref 0–6)
NITRITE UR-MCNC: NEGATIVE — SIGNIFICANT CHANGE UP
PCO2 BLDV: 45 MMHG — HIGH (ref 39–42)
PH BLDV: 7.36 — SIGNIFICANT CHANGE UP (ref 7.32–7.43)
PH UR: 6 — SIGNIFICANT CHANGE UP (ref 5–8)
PLAT MORPH BLD: NORMAL — SIGNIFICANT CHANGE UP
PLATELET # BLD AUTO: 608 K/UL — HIGH (ref 130–400)
PO2 BLDV: 25 MMHG — SIGNIFICANT CHANGE UP
POLYCHROMASIA BLD QL SMEAR: SLIGHT — SIGNIFICANT CHANGE UP
POTASSIUM BLDV-SCNC: 3.3 MMOL/L — LOW (ref 3.5–5.1)
POTASSIUM SERPL-MCNC: 4 MMOL/L — SIGNIFICANT CHANGE UP (ref 3.5–5)
POTASSIUM SERPL-SCNC: 4 MMOL/L — SIGNIFICANT CHANGE UP (ref 3.5–5)
PROT SERPL-MCNC: 7.6 G/DL — SIGNIFICANT CHANGE UP (ref 6–8)
PROT UR-MCNC: SIGNIFICANT CHANGE UP
RBC # BLD: 4.89 M/UL — SIGNIFICANT CHANGE UP (ref 4.2–5.4)
RBC # FLD: 20.5 % — HIGH (ref 11.5–14.5)
RBC BLD AUTO: ABNORMAL
SAO2 % BLDV: 35.7 % — SIGNIFICANT CHANGE UP
SCHISTOCYTES BLD QL AUTO: SLIGHT — SIGNIFICANT CHANGE UP
SMUDGE CELLS # BLD: PRESENT — SIGNIFICANT CHANGE UP
SODIUM SERPL-SCNC: 133 MMOL/L — LOW (ref 135–146)
SP GR SPEC: 1.05 — HIGH (ref 1.01–1.03)
UROBILINOGEN FLD QL: SIGNIFICANT CHANGE UP
WBC # BLD: 8.62 K/UL — SIGNIFICANT CHANGE UP (ref 4.8–10.8)
WBC # FLD AUTO: 8.62 K/UL — SIGNIFICANT CHANGE UP (ref 4.8–10.8)

## 2022-10-20 PROCEDURE — 99285 EMERGENCY DEPT VISIT HI MDM: CPT

## 2022-10-20 PROCEDURE — 71045 X-RAY EXAM CHEST 1 VIEW: CPT | Mod: 26

## 2022-10-20 PROCEDURE — 93010 ELECTROCARDIOGRAM REPORT: CPT

## 2022-10-20 PROCEDURE — 71275 CT ANGIOGRAPHY CHEST: CPT | Mod: 26,MA

## 2022-10-20 RX ORDER — ACETAMINOPHEN 500 MG
975 TABLET ORAL ONCE
Refills: 0 | Status: COMPLETED | OUTPATIENT
Start: 2022-10-20 | End: 2022-10-20

## 2022-10-20 RX ORDER — SODIUM CHLORIDE 9 MG/ML
1000 INJECTION INTRAMUSCULAR; INTRAVENOUS; SUBCUTANEOUS ONCE
Refills: 0 | Status: COMPLETED | OUTPATIENT
Start: 2022-10-20 | End: 2022-10-20

## 2022-10-20 RX ORDER — DEXAMETHASONE 0.5 MG/5ML
6 ELIXIR ORAL ONCE
Refills: 0 | Status: COMPLETED | OUTPATIENT
Start: 2022-10-20 | End: 2022-10-20

## 2022-10-20 RX ADMIN — SODIUM CHLORIDE 2000 MILLILITER(S): 9 INJECTION INTRAMUSCULAR; INTRAVENOUS; SUBCUTANEOUS at 21:37

## 2022-10-20 NOTE — ED PROVIDER NOTE - NS ED ROS FT
Review of Systems:  CONSTITUTIONAL: (+)fever, (+)chills  SKIN: (-)rash  EYES: (-) eye pain, (-) vision changes  ENT: (+) rhinorrhea, (+) congestion, (+) sore throat  RESPIRATORY: (+) shortness of breath, (+) cough  CARDIAC: (+) chest pain, (+) palpitations  GI: (-) abdominal pain, (+) nausea, (+) vomiting, (-) diarrhea, (-) constipation, (-) melena (-) hematochezia  : (-) dysuria, (+) frequency, (-) hematuria  NEUROLOGIC: (-) numbness or tingling, (-) focal weakness, (-) headache, (-) dizziness  All other systems negative, unless specified in HPI

## 2022-10-20 NOTE — ED PROVIDER NOTE - PROGRESS NOTE DETAILS
ER: pt signed out Dr. Salazar. reassess. dispo pending. BG: pt reassessed. Feeling better. Still has cough AN: Sign out received from Dr. Green. Pt with diagnosis of covid 19 1 day ago here with worsening SOB. Required ekg, labs, imaging, meds. Symptoms improved. Will dc with outpt f/up

## 2022-10-20 NOTE — ED PROVIDER NOTE - PHYSICAL EXAMINATION
CONSTITUTIONAL: awake, sitting in stretcher in no acute distress  SKIN: Warm, dry  HEAD: Normocephalic; atraumatic  EYES: No conjunctival injection. EOMI. PERRL  ENT: +congestion; oropharynx nonerythematous; airway clear  NECK: Supple; non tender, no lymphadenopathy  CARD: tachycardic to 110s; S1, S2 normal; no murmurs, gallops, or rubs  RESP: + bibasilar crackles, no wheezing or rhonchi  ABD: Soft, nontender, nondistended, nonrigid, no guarding or rebound tenderness  BACK: no midline back pain or stepoff, left paraspinal and lower lumbar tenderness  EXT: Normal ROM,  no edema, good radial pulse  NEURO: A&O x3, speaking in full clear sentences, no facial asymmetry, moving all extremities

## 2022-10-20 NOTE — ED PROVIDER NOTE - PATIENT PORTAL LINK FT
You can access the FollowMyHealth Patient Portal offered by [facility] by registering at the following website: [email or web address]. By joining [facility]’s FollowMyHealth portal, you will also be able to view your health information using other applications (apps) compatible with our system.

## 2022-10-20 NOTE — ED PROVIDER NOTE - ATTENDING CONTRIBUTION TO CARE
37 yr old f w/ a pmh significant for DM (on insulin pump), sciatica, depression, pseuodseizures, who presents with cough, fever, sob. Pt states that she was recently ddx with covid and has been having worsening symptoms of sob, pt denies any chest pain. Of note, pt also states that she has been having urinary symptoms. Pt denies any other medical complaints.     VITAL SIGNS: I have reviewed nursing notes and confirm.  CONSTITUTIONAL: non-toxic, well appearing  SKIN: no rash, no petechiae.  EYES: EOMI, pink conjunctiva, anicteric  ENT: tongue midline, no exudates, MMM  NECK: Supple; no meningismus, no JVD  CARD: tachycardic , no murmurs, equal radial pulses bilaterally 2+  RESP: CTAB, no respiratory distress  ABD: Soft, non-tender, non-distended, no peritoneal signs, no HSM, no CVA tenderness  EXT: Normal ROM x4. No edema. No calves tenderness  NEURO: Alert, oriented x3.     a/p  37 yr old f that presents with cough and sob   -labs  -ekg  -imaging  -ua  -ivf  -reassess  -dispo pending

## 2022-10-20 NOTE — ED PROVIDER NOTE - OBJECTIVE STATEMENT
37yoF PMHx DM (on insulin pump), sciatica, depression, pseudoseizures presenting from assisted living home for evaluation of productive cough, fever, chest pain, vomiting, sob, back pain for 1 day. Patient reports the cough has been unchanged for the past month, previously evaluated at and urgent care and by her PMD, not improved by tessalon perlses, inhalers. Prior CXR was negative. Patient tested positive for COVID yesterday. Patient reports 3 episodes of vomiting up a teaspoon of clear fluid x3, most recently just after dinner. Patient also complains of urinary incontinence when she coughs, denies dysuria, abdominal pain. Patient states her sugar has also been high recently, and was over 500 3 days ago.

## 2022-10-20 NOTE — ED PROVIDER NOTE - CARE PLAN
1 Principal Discharge DX:	Pneumonia due to COVID-19 virus   Principal Discharge DX:	2019 novel coronavirus disease (COVID-19)

## 2022-10-20 NOTE — ED PROVIDER NOTE - CLINICAL SUMMARY MEDICAL DECISION MAKING FREE TEXT BOX
Sign out received from Dr. Green. Pt with diagnosis of covid 19 1 day ago here with worsening SOB. Required ekg, labs, imaging, meds. Symptoms improved. Will dc with outpt f/up

## 2022-10-21 ENCOUNTER — NON-APPOINTMENT (OUTPATIENT)
Age: 37
End: 2022-10-21

## 2022-10-21 ENCOUNTER — APPOINTMENT (OUTPATIENT)
Dept: PSYCHIATRY | Facility: CLINIC | Age: 37
End: 2022-10-21

## 2022-10-21 VITALS — HEART RATE: 98 BPM

## 2022-10-21 LAB — SARS-COV-2 RNA SPEC QL NAA+PROBE: DETECTED

## 2022-10-21 RX ORDER — IBUPROFEN 200 MG
1 TABLET ORAL
Qty: 40 | Refills: 0
Start: 2022-10-21 | End: 2022-10-30

## 2022-10-21 RX ORDER — ACETAMINOPHEN 500 MG
2 TABLET ORAL
Qty: 80 | Refills: 0
Start: 2022-10-21 | End: 2022-10-30

## 2022-10-21 RX ADMIN — Medication 975 MILLIGRAM(S): at 00:56

## 2022-10-21 RX ADMIN — SODIUM CHLORIDE 1000 MILLILITER(S): 9 INJECTION INTRAMUSCULAR; INTRAVENOUS; SUBCUTANEOUS at 00:00

## 2022-10-21 RX ADMIN — Medication 101.2 MILLIGRAM(S): at 00:56

## 2022-10-22 LAB
CULTURE RESULTS: SIGNIFICANT CHANGE UP
SPECIMEN SOURCE: SIGNIFICANT CHANGE UP

## 2022-10-28 ENCOUNTER — NON-APPOINTMENT (OUTPATIENT)
Age: 37
End: 2022-10-28

## 2022-11-02 ENCOUNTER — RX RENEWAL (OUTPATIENT)
Age: 37
End: 2022-11-02

## 2022-11-07 ENCOUNTER — APPOINTMENT (OUTPATIENT)
Dept: PODIATRY | Facility: CLINIC | Age: 37
End: 2022-11-07

## 2022-11-07 ENCOUNTER — OUTPATIENT (OUTPATIENT)
Dept: OUTPATIENT SERVICES | Facility: HOSPITAL | Age: 37
LOS: 1 days | Discharge: HOME | End: 2022-11-07

## 2022-11-07 PROCEDURE — 99213 OFFICE O/P EST LOW 20 MIN: CPT

## 2022-11-09 ENCOUNTER — APPOINTMENT (OUTPATIENT)
Dept: PSYCHIATRY | Facility: CLINIC | Age: 37
End: 2022-11-09

## 2022-11-09 ENCOUNTER — OUTPATIENT (OUTPATIENT)
Dept: OUTPATIENT SERVICES | Facility: HOSPITAL | Age: 37
LOS: 1 days | Discharge: HOME | End: 2022-11-09

## 2022-11-09 ENCOUNTER — RX RENEWAL (OUTPATIENT)
Age: 37
End: 2022-11-09

## 2022-11-09 DIAGNOSIS — F41.1 GENERALIZED ANXIETY DISORDER: ICD-10-CM

## 2022-11-09 LAB
25(OH)D3 SERPL-MCNC: 24 NG/ML
ALBUMIN SERPL ELPH-MCNC: 4.4 G/DL
ALP BLD-CCNC: 156 U/L
ALT SERPL-CCNC: 39 U/L
ANION GAP SERPL CALC-SCNC: 12 MMOL/L
AST SERPL-CCNC: 22 U/L
BASOPHILS # BLD AUTO: 0.05 K/UL
BASOPHILS NFR BLD AUTO: 0.6 %
BILIRUB SERPL-MCNC: <0.2 MG/DL
BUN SERPL-MCNC: 11 MG/DL
CALCIUM SERPL-MCNC: 9.2 MG/DL
CHLORIDE SERPL-SCNC: 98 MMOL/L
CHOLEST SERPL-MCNC: 200 MG/DL
CO2 SERPL-SCNC: 23 MMOL/L
CREAT SERPL-MCNC: 0.6 MG/DL
EGFR: 118 ML/MIN/1.73M2
EOSINOPHIL # BLD AUTO: 0.09 K/UL
EOSINOPHIL NFR BLD AUTO: 1.1 %
ESTIMATED AVERAGE GLUCOSE: 235 MG/DL
GLUCOSE SERPL-MCNC: 265 MG/DL
HBA1C MFR BLD HPLC: 9.8 %
HCT VFR BLD CALC: 32.4 %
HDLC SERPL-MCNC: 64 MG/DL
HGB BLD-MCNC: 8.8 G/DL
IMM GRANULOCYTES NFR BLD AUTO: 0.4 %
LDLC SERPL CALC-MCNC: 109 MG/DL
LYMPHOCYTES # BLD AUTO: 2.43 K/UL
LYMPHOCYTES NFR BLD AUTO: 29.2 %
MAN DIFF?: NORMAL
MCHC RBC-ENTMCNC: 18.4 PG
MCHC RBC-ENTMCNC: 27.2 G/DL
MCV RBC AUTO: 67.9 FL
MONOCYTES # BLD AUTO: 0.6 K/UL
MONOCYTES NFR BLD AUTO: 7.2 %
NEUTROPHILS # BLD AUTO: 5.12 K/UL
NEUTROPHILS NFR BLD AUTO: 61.5 %
NONHDLC SERPL-MCNC: 136 MG/DL
PLATELET # BLD AUTO: 589 K/UL
POTASSIUM SERPL-SCNC: 4.9 MMOL/L
PROT SERPL-MCNC: 7.6 G/DL
RBC # BLD: 4.77 M/UL
RBC # FLD: 21 %
SODIUM SERPL-SCNC: 133 MMOL/L
TRIGL SERPL-MCNC: 136 MG/DL
TSH SERPL-ACNC: 1.13 UIU/ML
WBC # FLD AUTO: 8.32 K/UL

## 2022-11-10 ENCOUNTER — OUTPATIENT (OUTPATIENT)
Dept: OUTPATIENT SERVICES | Facility: HOSPITAL | Age: 37
LOS: 1 days | Discharge: HOME | End: 2022-11-10

## 2022-11-10 ENCOUNTER — APPOINTMENT (OUTPATIENT)
Dept: PSYCHIATRY | Facility: CLINIC | Age: 37
End: 2022-11-10

## 2022-11-10 DIAGNOSIS — F45.0 SOMATIZATION DISORDER: ICD-10-CM

## 2022-11-10 DIAGNOSIS — F41.1 GENERALIZED ANXIETY DISORDER: ICD-10-CM

## 2022-11-10 PROCEDURE — 99215 OFFICE O/P EST HI 40 MIN: CPT

## 2022-11-10 RX ORDER — NYSTATIN AND TRIAMCINOLONE ACETONIDE 100000; 1 [USP'U]/G; MG/G
100000-0.1 OINTMENT TOPICAL 3 TIMES DAILY
Qty: 1 | Refills: 1 | Status: DISCONTINUED | COMMUNITY
Start: 2021-05-04 | End: 2022-11-10

## 2022-11-10 RX ORDER — INDOMETHACIN 50 MG/1
50 CAPSULE ORAL DAILY
Qty: 30 | Refills: 3 | Status: DISCONTINUED | COMMUNITY
Start: 2019-08-08 | End: 2022-11-10

## 2022-11-10 RX ORDER — BLOOD SUGAR DIAGNOSTIC
STRIP MISCELLANEOUS
Qty: 200 | Refills: 5 | Status: DISCONTINUED | COMMUNITY
Start: 2020-09-09 | End: 2022-11-10

## 2022-11-10 RX ORDER — NAPROXEN 500 MG/1
500 TABLET ORAL
Qty: 30 | Refills: 0 | Status: DISCONTINUED | COMMUNITY
Start: 2020-06-08 | End: 2022-11-10

## 2022-11-10 RX ORDER — CHOLECALCIFEROL (VITAMIN D3) 25 MCG
25 MCG TABLET ORAL DAILY
Qty: 30 | Refills: 5 | Status: DISCONTINUED | COMMUNITY
Start: 2021-01-26 | End: 2022-11-10

## 2022-11-10 RX ORDER — METRONIDAZOLE 7.5 MG/G
0.75 GEL VAGINAL
Qty: 1 | Refills: 0 | Status: DISCONTINUED | COMMUNITY
Start: 2022-07-05 | End: 2022-11-10

## 2022-11-10 RX ORDER — METRONIDAZOLE 500 MG/1
500 TABLET ORAL TWICE DAILY
Qty: 14 | Refills: 0 | Status: DISCONTINUED | COMMUNITY
Start: 2021-08-31 | End: 2022-11-10

## 2022-11-10 RX ORDER — BENZONATATE 200 MG/1
200 CAPSULE ORAL
Qty: 21 | Refills: 0 | Status: DISCONTINUED | COMMUNITY
Start: 2021-11-17 | End: 2022-11-10

## 2022-11-10 RX ORDER — BLOOD SUGAR DIAGNOSTIC
STRIP MISCELLANEOUS EVERY 4 HOURS
Qty: 200 | Refills: 11 | Status: DISCONTINUED | COMMUNITY
Start: 2020-08-19 | End: 2022-11-10

## 2022-11-10 RX ORDER — DEXTROMETHORPHAN HYDROBROMIDE AND GUAIFENESIN 20; 400 MG/20ML; MG/20ML
5-100 SOLUTION ORAL EVERY 8 HOURS
Qty: 1 | Refills: 0 | Status: DISCONTINUED | COMMUNITY
Start: 2022-10-13 | End: 2022-11-10

## 2022-11-10 RX ORDER — CLOTRIMAZOLE AND BETAMETHASONE DIPROPIONATE 10; .5 MG/G; MG/G
1-0.05 CREAM TOPICAL TWICE DAILY
Qty: 1 | Refills: 0 | Status: DISCONTINUED | COMMUNITY
Start: 2021-04-02 | End: 2022-11-10

## 2022-11-10 RX ORDER — FLUCONAZOLE 150 MG/1
150 TABLET ORAL
Qty: 1 | Refills: 0 | Status: DISCONTINUED | COMMUNITY
Start: 2022-07-05 | End: 2022-11-10

## 2022-11-10 RX ORDER — ESCITALOPRAM OXALATE 10 MG/1
10 TABLET, FILM COATED ORAL
Refills: 0 | Status: DISCONTINUED | COMMUNITY
End: 2022-11-10

## 2022-11-10 RX ORDER — AMMONIUM LACTATE 12 %
12 CREAM (GRAM) TOPICAL
Qty: 1 | Refills: 5 | Status: DISCONTINUED | COMMUNITY
Start: 2021-09-27 | End: 2022-11-10

## 2022-11-10 RX ORDER — DICLOFENAC SODIUM 1% 10 MG/G
1 GEL TOPICAL DAILY
Qty: 3 | Refills: 4 | Status: DISCONTINUED | COMMUNITY
Start: 2022-09-22 | End: 2022-11-10

## 2022-11-10 RX ORDER — SENNOSIDES 8.6 MG/1
8.6 TABLET ORAL
Qty: 60 | Refills: 3 | Status: DISCONTINUED | COMMUNITY
Start: 2022-02-25 | End: 2022-11-10

## 2022-11-10 RX ORDER — FLUCONAZOLE 150 MG/1
150 TABLET ORAL
Qty: 1 | Refills: 1 | Status: DISCONTINUED | COMMUNITY
Start: 2021-05-06 | End: 2022-11-10

## 2022-11-10 RX ORDER — METRONIDAZOLE 500 MG/1
500 TABLET ORAL TWICE DAILY
Qty: 14 | Refills: 0 | Status: DISCONTINUED | COMMUNITY
Start: 2022-09-20 | End: 2022-11-10

## 2022-11-10 RX ORDER — PROMETHAZINE HYDROCHLORIDE AND DEXTROMETHORPHAN HYDROBROMIDE ORAL SOLUTION 15; 6.25 MG/5ML; MG/5ML
6.25-15 SOLUTION ORAL
Qty: 1 | Refills: 0 | Status: DISCONTINUED | COMMUNITY
Start: 2022-10-13 | End: 2022-11-10

## 2022-11-10 RX ORDER — DICLOFENAC SODIUM 1% 10 MG/G
1 GEL TOPICAL DAILY
Qty: 4 | Refills: 2 | Status: DISCONTINUED | COMMUNITY
Start: 2022-09-27 | End: 2022-11-10

## 2022-11-10 RX ORDER — TERCONAZOLE 8 MG/G
0.8 CREAM VAGINAL DAILY
Qty: 1 | Refills: 1 | Status: DISCONTINUED | COMMUNITY
Start: 2021-05-04 | End: 2022-11-10

## 2022-11-10 NOTE — PLAN
[[facility]] : [facility]  [Supportive Therapy] : Supportive Therapy [Recommended Frequency of Visits: ____] : Recommended frequency of visits: [unfilled] [Return in ____ week(s)] : Return in [unfilled] week(s) [de-identified] : Patient complained of not feeling well physically and emotionally. She reports feeling overwhelmed and stressed with her health issues and father being in the hospital due to his cancer. She reports that she is trying to stay positive for him so he does not give up and is talking to him daily but unable to see him in the hospital since she is not vaccinated.She reports that her sugar was up to 500 today. She has been eating sweets related to emotional eating. Spoke about other interpersonal and relationship conflict. Addressed thoughts that trigger anxiety, panic and emotional eating. Addressed ways to manage, set limits, and focus on solutions to problems. She is afraid to be alone in her room which she had to do when she was quarantined during her COVID episode. She is afraid of getting a seizure and falling while she is alone. She will inquire about getting life alert. Assigned and provided patient with CBT/DBT exercises and worksheets.

## 2022-11-10 NOTE — DISCUSSION/SUMMARY
[FreeTextEntry1] : Ms. Ania Mei is a 38yo single  female.   Ania reported severe severe stressors sine beginning of 2020 , including homelessness , changes in living situation, stress over the covid pandemic with losses within the family and constant worries about her fathers deteriorating physical health.  Ania reported increased of anxiety with episodes of falling, generalized shakiness, head pressure and inability to move, patients EEG and video EEG were reportedly negative for any seizure activity \par \par Switch Clonazepam to 0.5mg po twice daily\par Escitalopram 20mg po daily\par Hydroxyzine 25mg po prn \par \par \par

## 2022-11-10 NOTE — PHYSICAL EXAM
[Cooperative] : cooperative [Euthymic] : euthymic [Full] : full [Clear] : clear [Linear/Goal Directed] : linear/goal directed [WNL] : within normal limits [Average] : average [de-identified] : Fair [de-identified] : Fair

## 2022-11-10 NOTE — PAST MEDICAL HISTORY
[FreeTextEntry1] : Four inpatient admissions, two admissions 2022.  (March 2022).  Pseudoseizures\par Three SA: 16-18yo in context of deaths of several family members.  OD on pills, nail polish remover, peroxide. . Trials of Zyprexa, Celexa.\par \par

## 2022-11-10 NOTE — REASON FOR VISIT
[Patient] : Patient [FreeTextEntry1] : Patient is a 37 year old female being seen for a follow up therapy session. COVID screening negative.  [Follow-Up Visit] : a follow-up visit

## 2022-11-10 NOTE — HISTORY OF PRESENT ILLNESS
[No] : no [FreeTextEntry1] : Ania presents to the clinic accompanied by her mother\par \par She is seen alone in the session. She expresses appropriate sleep. She has been stressed due to her 71yo father's health. As a result she has been 'overeating' daily and experienced subjective weight gain of thirty pounds over the past month. She expresses intermittent low mood, anxiety as she worries about a possible syncopal episode despite controlled sugars. She experiences episodes of chest tightness, headaches and shaking for duration of few minutes. \par \par She engages in recreational activities with peers. She is social with peers, goes on walks with mother or friends. She denies self injurious behavior. She is in agreement to switch Clonazepam to bid dosing.  Presently she denies thoughts of harm to self or others, safety plan is discussed.   [facility],  Reference [id number] (#60, last filled 11/3/22)\par  [de-identified] : Ms. Ania Mei is a 36 year old single  female of [place] descent, and she resides in [facility] an adult assisted living facility for “a little bit over a year” with her parents after they became homeless 2019. She reports a medical history relevant for diabetes, diabetic neuropathy, back pain, migraine headaches, hand & leg tremors, tachycardia, GERD, & vulvodynia, and she has a psychiatric history relevant for anxiety disorder & bipolar disorder as well as 4 IPP admissions for suicidal ideations & attempts about 20 years ago. She reported that she was last in therapy “4 or 5 years ago” in this clinic program.  \par \par Ms. Mei reported that she initially diagnosed with bipolar disorder "right after 911", that was when she first experienced suicidal ideations, and had her first IPP admission. She reported being in treatment at [facility] until she turned 18 years old and did not engage in treatment again until she was "31 or 32 years old. She reported that she attended this clinic, had 2 sessions and never returned to treatment.\par \par  She reports that she has had a great deal of stressors, she and her parents became homeless "just before the pandemic", they lived between hotels and their car, her father became severe medically ill (cancer on his nose, liver issues, kidney issues, gall bladder issues, nodule on his lung), after her father had surgery the [facility] was able to facilitate a place for the family and they have resided in [facility]  since 10/2020. With all these stressors in her life she noticed her anxiety started to spike, she also witnessed several residents die, her aunt  from COVID.\par \par She reports being anxious constantly where she experiences dizziness, light handedness, chest pains, heart palpitations, and stomach pains “and they have told me I’m having anxiety & panic attacks”. She reported that she saw the psychiatrist at her residence and she prescribed Buspar & melatonin but she “did not feel right on it” and she stopped taking the medications. \par

## 2022-11-10 NOTE — SOCIAL HISTORY
[Assisted Living Facility] : lives in an assisted living facility [Disabled] : disabled [Never ] : never  [High School] : high school [None] : none [No Known Substance Use] : no known substance use [No Known Use] : no known use [Yes] : yes [FreeTextEntry1] : [facility] for the past 2.5yrs [TextBox_52] : Prescribed

## 2022-11-14 NOTE — PLAN
[[facility]] : [facility]  [Supportive Therapy] : Supportive Therapy [Recommended Frequency of Visits: ____] : Recommended frequency of visits: [unfilled] [Return in ____ week(s)] : Return in [unfilled] week(s) [FreeTextEntry2] : Goal #1 " I want to feel better, and need help managing my anxiety and stress" \par \par Objective #1 Revised..  Patient will learn assertive communication and be able to set boundaries and limits.  \par Objective #2 Continued... Patient will learn and utilize positive coping, stress management, and CBT/DBT methods. \par  \par  [de-identified] : Patient complained of not feeling well physically and emotionally. She reports feeling overwhelmed and stressed with her health issues and father being in the hospital due to his cancer. She reports that she is trying to stay positive for him so he does not give up and is talking to him daily but unable to see him in the hospital since she is not vaccinated.She reports that her sugar was up to 500 today. She has been eating sweets related to emotional eating. Spoke about other interpersonal and relationship conflict. Addressed thoughts that trigger anxiety, panic and emotional eating. Addressed ways to manage, set limits, and focus on solutions to problems. She is afraid to be alone in her room which she had to do when she was quarantined during her COVID episode. She is afraid of getting a seizure and falling while she is alone. She will inquire about getting life alert. Assigned and provided patient with CBT/DBT exercises and worksheets.  [FreeTextEntry1] : Patient will focus on solutions to problems, positive coping, relaxation, stress management, and CBT methods. She will attend to medical issues and medical follow up appointments. She will follow up with psychiatrist tomorrow and follow up with therapy on 11/21.

## 2022-11-14 NOTE — PLAN
[[facility]] : [facility]  [Supportive Therapy] : Supportive Therapy [Recommended Frequency of Visits: ____] : Recommended frequency of visits: [unfilled] [Return in ____ week(s)] : Return in [unfilled] week(s) [FreeTextEntry2] : Goal #1 " I want to feel better, and need help managing my anxiety and stress" \par \par Objective #1 Revised..  Patient will learn assertive communication and be able to set boundaries and limits.  \par Objective #2 Continued... Patient will learn and utilize positive coping, stress management, and CBT/DBT methods. \par  \par  [de-identified] : Patient complained of not feeling well physically and emotionally. She reports feeling overwhelmed and stressed with her health issues and father being in the hospital due to his cancer. She reports that she is trying to stay positive for him so he does not give up and is talking to him daily but unable to see him in the hospital since she is not vaccinated.She reports that her sugar was up to 500 today. She has been eating sweets related to emotional eating. Spoke about other interpersonal and relationship conflict. Addressed thoughts that trigger anxiety, panic and emotional eating. Addressed ways to manage, set limits, and focus on solutions to problems. She is afraid to be alone in her room which she had to do when she was quarantined during her COVID episode. She is afraid of getting a seizure and falling while she is alone. She will inquire about getting life alert. Assigned and provided patient with CBT/DBT exercises and worksheets.  [FreeTextEntry1] : Patient will focus on solutions to problems, positive coping, relaxation, stress management, and CBT methods. She will attend to medical issues and medical follow up appointments. She will follow up with psychiatrist tomorrow and follow up with therapy on 11/21.

## 2022-11-14 NOTE — REASON FOR VISIT
[Patient] : Patient [FreeTextEntry1] : Patient is a 37 year old female being seen for a follow up therapy session. COVID screening negative.

## 2022-11-15 ENCOUNTER — APPOINTMENT (OUTPATIENT)
Dept: OPHTHALMOLOGY | Facility: CLINIC | Age: 37
End: 2022-11-15

## 2022-11-15 NOTE — ED PROVIDER NOTE - NS_EDPROVIDERDISPOUSERTYPE_ED_A_ED
[facility] Pre-Visit Screening/Summary     Sumi Roque is a 30 year old  who was referred to our service by Dr. Billy Calloway for dizziness.     This patient has a history of significant anxiety and panic disorder which is treated by psychiatry with benzodiazepines, propranolol, Remeron, hydroxyzine, Viibryd, and CBT.    She has a history of dizziness since at least 2018 per chart review. She has been evaluated by neurology and completed a tilt table test that was negative for POTS. She also completed autonomic testing in Sept 2021 noting: \"this findings on this study are not indicative of autonomic failure. This study does provide evidence for autonomic peripheral neuropathy in a somewhat length dependent pattern involving postganglionic sudomotor function\".  She has also completed a cardiac work-up including ECHO April 2021 which was unremarkable with EF 67%, a 7 day cardiac monitor in May 2021 showing underlying sinus rhythm with episodes of sinus tachycardia without SVT/supraventricular ectopy or arrhthymias, and a 48 hour Holter monitor in July 2021 with 4 symptomatic transmission which all correlated with sinus or mild sinus tachycardia and no arrhthymias.    Regarding an ENT work-up, she saw Dr. Qureshi 6/25/2021 and complained of lightheadedness at that time when rising from a seated position and noted worsening since starting mirtazapine and Klonopin. She denied room spinning dizziness or accompanying otologic symptoms. He did not feel her symptoms were otologic in nature. She was also evaluated by Dr. Calloway 10/26/2021 and described it as always feeling off balance and noted room spinning sensation for a couple minutes when turning her head quickly. She noted intermittent tinnitus which was not associated with the dizziness. Dr. Calloway also noted her presentation was not consistent with an otologic etiology. Further work-up included VNG completed 11/11/2021 which was unremarkable.    Most  recently she returned to Dr. Calloway 10/24/2022 complaining of dizziness with head position change and imbalance. She continued to deny other otologic symptoms, but did note she often has headaches with her dizziness. He suspected vestibular migraine or possible 3PD and discussed possible referral to neurotology which the patient was agreeable to.    She is not a smoker. She has a past medical history including, but not limited to, GERD, HTN, and anxiety/depression.    Past Medical History:   Diagnosis Date   • Anxiety 08/2020   • Chronic pain    • Depression 11/2020   • GERD (gastroesophageal reflux disease) 2020   • History of chickenpox    • History of normal resting EKG 2008   • HSV-2 (herpes simplex virus 2) infection 03/2021   • Hypertension 2021   • Infected pierced belly button    • Laceration     left forehead   • Mild dysplasia of cervix 2010    Positive HPV, 3/15 [facility] (NOT 16 or18)   • Miscarriage    • Molluscum contagiosum 07/2012    right inner thigh   • Trichomonas infection 05/15/2014   • Unplanned pregnancy      Patient Active Problem List    Diagnosis Date Noted   • Iron deficiency 07/03/2022     Priority: Low   • Obesity (BMI 30.0-34.9) 01/27/2022     Priority: Low   • Hyperlipidemia 01/27/2022     Priority: Low   • Restless legs syndrome 01/05/2022     Priority: Low   • Tachycardia/Palpitations 06/10/2021     Priority: Low     Echo 5/13/2021 - 67%  Event monitor - SR - average HR 92 bpm     • Acute non-recurrent sphenoidal sinusitis 05/18/2021     Priority: Low   • Chest wall pain 05/06/2021     Priority: Low   • Neck pain, bilateral 03/03/2021     Priority: Low   • Generalized anxiety disorder 01/28/2021     Priority: Low   • Herpes zoster dermatitis of eyelids 11/05/2020     Priority: Low       Imaging:   MRI Brain w/ and w/o Contrast 7/8/2022 (completed at [facility])  Comparison(s): MRI brain without and with contrast, 12/29/2021. CT head without contrast, 8/12/2021. CT head and neck  angiography with contrast, 4/10/2021.     Findings:   Parenchyma: Few similar small T2 FLAIR hyperintense signal foci of nonspecific gliosis scattered within the bilateral frontal subcortical white matter. No convincing new or enhancing parenchymal lesions are identified relative to 12/29/2021. No abnormal restricted diffusion to suggest acute or subacute ischemic infarct.     Additional comments: There is no midline shift, abnormal extra-axial fluid collection, or acute intracranial hemorrhage. No stigmata of remote hemosiderin deposition. The basal cisterns are patent.     Ventricles: Similar, normal ventricle sizes and configuration. No hydrocephalus.     Flow voids: The normal major intracranial arterial flow voids are visualized.     Enhancement: No abnormal intra-axial or extra-axial enhancement is identified.     Sinuses and mastoid air cells: The imaged paranasal sinuses and mastoid air cells are clear. The internal auditory canals and cerebellopontine angle cisterns appear preserved.     Orbits: The bilateral globes and retrobulbar soft tissues appear preserved.     Midline structures: The pituitary fossa and craniocervical junction are normal.     Marrow: Similar small lipid-rich hemangioma suspected within the left parietal calvarium. Otherwise, T1 marrow signal of the skull and upper cervical spine is appropriate for age.    Impression:  1. No convincing evidence of acute intracranial abnormality or significant interval changes occurred MRI brain study performed on 12/29/2021.     2. Few similar small hyperintense signal foci of nonspecific gliosis scattered within the bilateral frontal subcortical white matter, of uncertain clinical significance with similar relatively broad differential considerations delineated previously on 12/29/2021. No convincing new or enhancing parenchymal lesions are identified.     CT Head level 1 6/13/2022  COMPARISON:  CT head dated October 13, 2021. MRI brain dated February  24, 2021.     FINDINGS:    No acute intracranial hemorrhage. No evidence of regional hypodensity to suggest an evolving vascular insult in a major vascular territory.  MRI is more sensitive for detection of  acute ischemia. No evidence of hyperdense intravascular thrombosis. No pathologic extra-axial fluid collection, mass effect, or midline shift identified.  Brain morphology and volume are normal for age.  No significant white matter abnormality.  The imaged paranasal sinuses are clear.  The mastoid air cells are clear.  The osseous structures are unremarkable.     IMPRESSION:    1.  No acute intracranial abnormality identified.  If there is concern for an underlying abnormality, follow-up MRI could be considered.     Audiogram:   Comprehensive Hearing Test 10/24/2022  SUBJECTIVE:  Sumi Roque is seen today at the request of Nora Caraballo MD.      Patient has a history of dizziness. She had previous hearing testing and VNG testing completed last year which both showed normal results. Patient reported that she continues to struggle with her dizziness. This has not changed over the past year. She also reported occasional aural fullness in both ears. She noted occasional pain as well as ringing tinnitus more so in the right ear.     Patient denied hearing loss, noise exposure, family history of hearing loss.     OBJECTIVE:  AUDIOMETRIC RESULTS  Otoscopic Evaluation:  Examination reveals clear ear canals bilaterally     Audiogram:  Right ear:  Hearing within normal limits from 250-8000 Hz  Left ear:  Hearing within normal limits from 250-8000 Hz  Bone-conduction was not tested due to stable air-conduction thresholds.     Speech Audiometry:  Speech Reception Thresholds:  10 dB HL right ear and 5 dB HL left ear.  Word Recognition Test Scores:  100% right ear when presented at 50 dB HL in quiet and 100% left ear when presented at 50 dB HL in quiet.     Acoustic Immittance:  Right ear:  Middle ear  pressure and eardrum mobility within defined limits     Left ear:  Middle ear pressure and eardrum mobility within defined limits     IMPRESSIONS:  Hearing within normal limits in both ears. Thresholds have remained stable when compared with the previous evaluation. Dizziness.     VNG Testing 11/11/2021  OBJECTIVE:  VIDEONYSTAGMOGRAPHY (VNG) RESULTS:  Otoscopy: clear ear canals bilaterally  Spontaneous nystagmus: (Within Normal Limits) No spontaneous nystagmus was noted.  Saccade test: (Within Normal Limits) Saccades were normal for latency, velocity and accuracy.  Gaze test: (Within Normal Limits) No eyes-open horizontal or vertical nystagmus was noted.  Tracking tests:  Smooth pursuit: (Within Normal Limits) Satisfactory smooth pursuit tracking in each direction.  Optokinetic test : (Within Normal Limits) Optokinetic responses symmetric in both directions.  Head Shake test: (Within Normal Limits) Head-shake testing evoked no consistent nystagmus post active head shake.  Roslyn-Hallpike:              Head Right: No nystagmus, no dizziness while lying down or sitting up. Negative Flora-Hallpike.             Head Left: No nystagmus, no dizziness while lying down or sitting up. Negative Flora-Hallpike.  Vertebral Artery Screening Test (VAST):             Head Right: Patient showed no symptoms in test position. Negative VAST.             Head Left: Patient showed no symptoms in test position. Negative VAST.  Positional Testing:             Seated: no nystagmus observed.             Supine: no nystagmus observed.             Head Right: 3º/sec right-beating nystagmus              Head Left: no nystagmus observed.             Body Right: Did not test.             Body Left: Did not test.             Pre-Caloric: no nystagmus observed.  Caloric Testing (air irrigations):             Right Cool: 25º/sec left-beating nystagmus              Left Cool: 39º/sec right-beating nystagmus              Right Warm: 40º/sec right-beating  nystagmus              Left Warm: 53º/sec left-beating nystagmus   Caloric Weakness: 17% in the right ear     IMPRESSION:  Normal VNG. Caloric responses are robust and symmetrical. Oculomotor exam and positional/positioning tests are unremarkable.     Scheduling:  · PSRs: Please schedule as:  [facility] Consult In Office Appointment, TANDEM with PA, with PT evaluation   · Nursing: Please order above.  Also, please obtain the following records/imaging upload to PACS: MRI Brain w/ and w/o Contrast 7/8/2022 completed at [facility]       Attending Attestation (For Attendings USE Only)...

## 2022-11-16 ENCOUNTER — NON-APPOINTMENT (OUTPATIENT)
Age: 37
End: 2022-11-16

## 2022-11-16 ENCOUNTER — APPOINTMENT (OUTPATIENT)
Dept: INTERNAL MEDICINE | Facility: CLINIC | Age: 37
End: 2022-11-16

## 2022-11-16 ENCOUNTER — OUTPATIENT (OUTPATIENT)
Dept: OUTPATIENT SERVICES | Facility: HOSPITAL | Age: 37
LOS: 1 days | Discharge: HOME | End: 2022-11-16

## 2022-11-16 VITALS
BODY MASS INDEX: 31.41 KG/M2 | HEART RATE: 61 BPM | OXYGEN SATURATION: 99 % | SYSTOLIC BLOOD PRESSURE: 140 MMHG | DIASTOLIC BLOOD PRESSURE: 82 MMHG | WEIGHT: 184 LBS | HEIGHT: 64 IN | TEMPERATURE: 98 F

## 2022-11-16 DIAGNOSIS — D64.9 ANEMIA, UNSPECIFIED: ICD-10-CM

## 2022-11-16 PROCEDURE — 99214 OFFICE O/P EST MOD 30 MIN: CPT | Mod: GC

## 2022-11-16 RX ORDER — MULTIVITAMIN WITH FOLIC ACID 400 MCG
TABLET ORAL
Qty: 30 | Refills: 0 | Status: ACTIVE | COMMUNITY
Start: 2022-05-20

## 2022-11-16 RX ORDER — CLOTRIMAZOLE AND BETAMETHASONE DIPROPIONATE 10; .5 MG/G; MG/G
1-0.05 CREAM TOPICAL TWICE DAILY
Qty: 1 | Refills: 0 | Status: DISCONTINUED | COMMUNITY
Start: 2022-09-19 | End: 2022-11-16

## 2022-11-20 NOTE — PHYSICAL EXAM
[General Appearance - Alert] : alert [General Appearance - In No Acute Distress] : in no acute distress [Abnormal Walk] : normal gait [Nail Clubbing] : no clubbing  or cyanosis of the fingernails [Involuntary Movements] : no involuntary movements were seen [Motor Tone] : muscle strength and tone were normal [Skin Color & Pigmentation] : normal skin color and pigmentation [] : no rash [Right Foot Was Examined] : The right foot was examined [Left Foot Was Examined] : The left foot was examined [Normal Appearance] : normal in appearance [Normal in Appearance] : normal in appearance [Tenderness] : tender [1+] : 1+ in the dorsalis pedis [Deep Tendon Reflexes (DTR)] : deep tendon reflexes were 2+ and symmetric [Sensation] : the sensory exam was normal to light touch and pinprick [No Focal Deficits] : no focal deficits [Oriented To Time, Place, And Person] : oriented to person, place, and time [Impaired Insight] : insight and judgment were intact [Affect] : the affect was normal [Foot Ulcer] : no foot ulcer [FreeTextEntry1] : Hyperkeratotic tissue to medial hallux IPJ [Delayed in the Right Toes] : normal in the toes [Full ROM] : with limited range of motion [Delayed in the Left Toes] : normal in the toes

## 2022-11-20 NOTE — ASSESSMENT
[FreeTextEntry1] : Patient examined, history and chart reviewed\par Patient educated on diabetic foot health and maintenance at length \par Complete Diabetic foot examination performed, control glucose\par Hyperkeratotic tissue as noted above debrided with dermal curette and #15 blade w/o incident\par Follow up in 6 months or PRN\par \par \par \par

## 2022-11-20 NOTE — HISTORY OF PRESENT ILLNESS
[Sneakers] : reema [Walker] : a walker [FreeTextEntry1] : CRISTI MONTGOMERY  returns to clinic for a follow-up visit for a routine diabetic foot examination. Pt. states that she will be having her A1C done in February. Patient denies NVFC and denies SOB. Pt. denies any other pedal complaints at this time. Patient trims her own nails and callus with ped egg\par \par She was in the hospital a few months ago for COVID, colitis, ketoacidosis\par

## 2022-11-21 ENCOUNTER — APPOINTMENT (OUTPATIENT)
Dept: PSYCHIATRY | Facility: CLINIC | Age: 37
End: 2022-11-21

## 2022-11-21 ENCOUNTER — OUTPATIENT (OUTPATIENT)
Dept: OUTPATIENT SERVICES | Facility: HOSPITAL | Age: 37
LOS: 1 days | Discharge: HOME | End: 2022-11-21

## 2022-11-21 DIAGNOSIS — R26.2 DIFFICULTY IN WALKING, NOT ELSEWHERE CLASSIFIED: ICD-10-CM

## 2022-11-21 DIAGNOSIS — B35.1 TINEA UNGUIUM: ICD-10-CM

## 2022-11-21 DIAGNOSIS — M79.672 PAIN IN LEFT FOOT: ICD-10-CM

## 2022-11-21 DIAGNOSIS — M79.671 PAIN IN RIGHT FOOT: ICD-10-CM

## 2022-11-21 DIAGNOSIS — F45.0 SOMATIZATION DISORDER: ICD-10-CM

## 2022-11-21 DIAGNOSIS — E11.42 TYPE 2 DIABETES MELLITUS WITH DIABETIC POLYNEUROPATHY: ICD-10-CM

## 2022-11-21 DIAGNOSIS — F41.1 GENERALIZED ANXIETY DISORDER: ICD-10-CM

## 2022-11-21 PROBLEM — D64.9 ANEMIA, UNSPECIFIED TYPE: Status: ACTIVE | Noted: 2022-06-13

## 2022-11-21 NOTE — REVIEW OF SYSTEMS
[Earache] : earache [Sore Throat] : sore throat [Postnasal Drip] : postnasal drip [Negative] : Musculoskeletal [FreeTextEntry4] : cough

## 2022-11-21 NOTE — REASON FOR VISIT
[Patient] : Patient [FreeTextEntry1] : Patient is a 37 year old female being seen for a followup therapy session.COVID screening negative.

## 2022-11-21 NOTE — PLAN
[[facility]] : [facility]  [Supportive Therapy] : Supportive Therapy [Recommended Frequency of Visits: ____] : Recommended frequency of visits: [unfilled] [Return in ____ week(s)] : Return in [unfilled] week(s) [FreeTextEntry2] : Goal #1 " I want to feel better, and need help managing my anxiety and stress" \par \par Objective #1 Revised..  Patient will learn assertive communication and be able to set boundaries and limits.  \par Objective #2 Continued... Patient will learn and utilize positive coping, stress management, and CBT/DBT methods. \par  \par  [de-identified] : Patient reports that yesterday was a very difficult day since her father is back in the hospital due to pneumonia, MRSA infection, and other medical complications. She reports that she and mother do not want him back in [facility] since they feel he was not being taken care of properly. She reports that yesterday was the first time she has been father in a month and she was crying to see how he has deteriorated and is suffering. She reports that she was able to talk to him and get a positive response from him. She reports that she is also trying to be strong for her mother who is very upset, anxious, and dependent on her father. She reports that she and mother have been discussing what they will do if or when he dies. She reports that there is a DNR in place as her father wanted. She reports that she has been practicing deep breathing and reviewing CBT/DBT exercises that were provided at last session. She reports that she has been setting limits with male friend at her residence in order to prioritize time with father and for herself. She reports that her medication was adjusted to Klonopin .5mg twice a day. She reports that her Residence is not giving her the proper dosage and needs psychiatrist prescription on letterhead. She reports that she called psychiatrics to discuss this issue.  [FreeTextEntry1] : Patient will focus on positive coping, stress management, relaxation, and CBT methods. She will attend to her medical issues, continue medication regimen, and followup with therapy on 12/5.

## 2022-11-21 NOTE — HISTORY OF PRESENT ILLNESS
[FreeTextEntry1] : Follow up visit [de-identified] : 37 year old female with a past medical history of chronic pelvic pain Type 1 DM with chronic polyneuropathy of the lower extremities on an insulin pump, recurrent UTIs, PCOS, GERD, anxiety, and migraines presents for regular followup and cough of 4 weeks duration post COVID-19 on october 20/22 and anemia workup. she also reports mild SOB and chest pain. No fever, no palpitations.  Recently hospitalized on 10/22 for COVID-19 and  chest xray negative, RVP negative, she was discharged on cough medications and albuterol. No abx given. patient also reports ear pain and sore throat of 1 month duration most prominent over the past week. \par

## 2022-11-21 NOTE — PHYSICAL EXAM
[No Acute Distress] : no acute distress [Well Nourished] : well nourished [Well Developed] : well developed [Well-Appearing] : well-appearing [Normal Sclera/Conjunctiva] : normal sclera/conjunctiva [PERRL] : pupils equal round and reactive to light [EOMI] : extraocular movements intact [No JVD] : no jugular venous distention [No Lymphadenopathy] : no lymphadenopathy [Supple] : supple [Thyroid Normal, No Nodules] : the thyroid was normal and there were no nodules present [No Respiratory Distress] : no respiratory distress  [No Accessory Muscle Use] : no accessory muscle use [Clear to Auscultation] : lungs were clear to auscultation bilaterally [Normal Rate] : normal rate  [Regular Rhythm] : with a regular rhythm [Normal S1, S2] : normal S1 and S2 [No Murmur] : no murmur heard [No Carotid Bruits] : no carotid bruits [No Abdominal Bruit] : a ~M bruit was not heard ~T in the abdomen [No Varicosities] : no varicosities [Pedal Pulses Present] : the pedal pulses are present [No Edema] : there was no peripheral edema [No Palpable Aorta] : no palpable aorta [No Extremity Clubbing/Cyanosis] : no extremity clubbing/cyanosis [Soft] : abdomen soft [Non Tender] : non-tender [Non-distended] : non-distended [No Masses] : no abdominal mass palpated [No HSM] : no HSM [Normal Bowel Sounds] : normal bowel sounds [Normal Posterior Cervical Nodes] : no posterior cervical lymphadenopathy [Normal Anterior Cervical Nodes] : no anterior cervical lymphadenopathy [No CVA Tenderness] : no CVA  tenderness [No Spinal Tenderness] : no spinal tenderness [No Joint Swelling] : no joint swelling [Grossly Normal Strength/Tone] : grossly normal strength/tone [No Rash] : no rash [Coordination Grossly Intact] : coordination grossly intact [No Focal Deficits] : no focal deficits [Normal Gait] : normal gait [Deep Tendon Reflexes (DTR)] : deep tendon reflexes were 2+ and symmetric [Normal Affect] : the affect was normal [Normal Insight/Judgement] : insight and judgment were intact [de-identified] : mild inflammation in the left ear, tonsillar enlargement

## 2022-11-21 NOTE — ASSESSMENT
[FreeTextEntry1] : #Post viral Cough\par - 4 weeks of dry cough, vitals stable\par - RVP at ED negative\par - Chest xray at ED negative\par - Took a week of Benzonatate\par - C/w Albuterol\par - COVID-19 on 10/22\par - Promethazine prescribed last visit\par - losanges given \par \par \par # anemia :\par - Hb 8.8\par - FU iron studies, FU GI and GYN for anemia\par - possible upper endoscopy. \par \par # DM:\par - A1c 9.8 on 11/22 , recently her insulin regimen was increased on 10/18/22\par - Follows up with endocrinology \par

## 2022-11-22 DIAGNOSIS — J45.909 UNSPECIFIED ASTHMA, UNCOMPLICATED: ICD-10-CM

## 2022-11-22 DIAGNOSIS — Z00.00 ENCOUNTER FOR GENERAL ADULT MEDICAL EXAMINATION WITHOUT ABNORMAL FINDINGS: ICD-10-CM

## 2022-11-22 DIAGNOSIS — E11.9 TYPE 2 DIABETES MELLITUS WITHOUT COMPLICATIONS: ICD-10-CM

## 2022-11-22 DIAGNOSIS — D64.9 ANEMIA, UNSPECIFIED: ICD-10-CM

## 2022-11-23 ENCOUNTER — NON-APPOINTMENT (OUTPATIENT)
Age: 37
End: 2022-11-23

## 2022-11-23 DIAGNOSIS — J06.9 ACUTE UPPER RESPIRATORY INFECTION, UNSPECIFIED: ICD-10-CM

## 2022-11-30 ENCOUNTER — INPATIENT (INPATIENT)
Facility: HOSPITAL | Age: 37
LOS: 1 days | Discharge: HOME | End: 2022-12-02
Attending: HOSPITALIST | Admitting: HOSPITALIST

## 2022-11-30 VITALS
OXYGEN SATURATION: 100 % | SYSTOLIC BLOOD PRESSURE: 129 MMHG | HEART RATE: 120 BPM | RESPIRATION RATE: 22 BRPM | DIASTOLIC BLOOD PRESSURE: 65 MMHG | TEMPERATURE: 100 F | HEIGHT: 65 IN | WEIGHT: 158.07 LBS

## 2022-11-30 DIAGNOSIS — R11.2 NAUSEA WITH VOMITING, UNSPECIFIED: ICD-10-CM

## 2022-11-30 DIAGNOSIS — R10.9 UNSPECIFIED ABDOMINAL PAIN: ICD-10-CM

## 2022-11-30 DIAGNOSIS — D72.829 ELEVATED WHITE BLOOD CELL COUNT, UNSPECIFIED: ICD-10-CM

## 2022-11-30 DIAGNOSIS — U09.9 POST COVID-19 CONDITION, UNSPECIFIED: ICD-10-CM

## 2022-11-30 LAB
ALBUMIN SERPL ELPH-MCNC: 4.5 G/DL — SIGNIFICANT CHANGE UP (ref 3.5–5.2)
ALP SERPL-CCNC: 153 U/L — HIGH (ref 30–115)
ALT FLD-CCNC: 52 U/L — HIGH (ref 0–41)
ANION GAP SERPL CALC-SCNC: 14 MMOL/L — SIGNIFICANT CHANGE UP (ref 7–14)
APPEARANCE UR: CLEAR — SIGNIFICANT CHANGE UP
AST SERPL-CCNC: 47 U/L — HIGH (ref 0–41)
BASOPHILS # BLD AUTO: 0.07 K/UL — SIGNIFICANT CHANGE UP (ref 0–0.2)
BASOPHILS NFR BLD AUTO: 0.3 % — SIGNIFICANT CHANGE UP (ref 0–1)
BILIRUB SERPL-MCNC: 0.2 MG/DL — SIGNIFICANT CHANGE UP (ref 0.2–1.2)
BILIRUB UR-MCNC: NEGATIVE — SIGNIFICANT CHANGE UP
BUN SERPL-MCNC: 11 MG/DL — SIGNIFICANT CHANGE UP (ref 10–20)
CALCIUM SERPL-MCNC: 9.2 MG/DL — SIGNIFICANT CHANGE UP (ref 8.4–10.5)
CHLORIDE SERPL-SCNC: 97 MMOL/L — LOW (ref 98–110)
CO2 SERPL-SCNC: 23 MMOL/L — SIGNIFICANT CHANGE UP (ref 17–32)
COLOR SPEC: SIGNIFICANT CHANGE UP
CREAT SERPL-MCNC: 0.6 MG/DL — LOW (ref 0.7–1.5)
D DIMER BLD IA.RAPID-MCNC: 1049 NG/ML DDU — HIGH (ref 0–230)
DIFF PNL FLD: NEGATIVE — SIGNIFICANT CHANGE UP
EGFR: 118 ML/MIN/1.73M2 — SIGNIFICANT CHANGE UP
EOSINOPHIL # BLD AUTO: 0 K/UL — SIGNIFICANT CHANGE UP (ref 0–0.7)
EOSINOPHIL NFR BLD AUTO: 0 % — SIGNIFICANT CHANGE UP (ref 0–8)
FLUAV AG NPH QL: SIGNIFICANT CHANGE UP
FLUBV AG NPH QL: SIGNIFICANT CHANGE UP
GLUCOSE SERPL-MCNC: 234 MG/DL — HIGH (ref 70–99)
GLUCOSE UR QL: ABNORMAL
HCG SERPL QL: NEGATIVE — SIGNIFICANT CHANGE UP
HCT VFR BLD CALC: 34.2 % — LOW (ref 37–47)
HGB BLD-MCNC: 9.2 G/DL — LOW (ref 12–16)
IMM GRANULOCYTES NFR BLD AUTO: 0.6 % — HIGH (ref 0.1–0.3)
KETONES UR-MCNC: NEGATIVE — SIGNIFICANT CHANGE UP
LACTATE SERPL-SCNC: 2.4 MMOL/L — HIGH (ref 0.7–2)
LEUKOCYTE ESTERASE UR-ACNC: NEGATIVE — SIGNIFICANT CHANGE UP
LIDOCAIN IGE QN: 14 U/L — SIGNIFICANT CHANGE UP (ref 7–60)
LYMPHOCYTES # BLD AUTO: 1.06 K/UL — LOW (ref 1.2–3.4)
LYMPHOCYTES # BLD AUTO: 5 % — LOW (ref 20.5–51.1)
MCHC RBC-ENTMCNC: 18.1 PG — LOW (ref 27–31)
MCHC RBC-ENTMCNC: 26.9 G/DL — LOW (ref 32–37)
MCV RBC AUTO: 67.3 FL — LOW (ref 81–99)
MONOCYTES # BLD AUTO: 1.01 K/UL — HIGH (ref 0.1–0.6)
MONOCYTES NFR BLD AUTO: 4.8 % — SIGNIFICANT CHANGE UP (ref 1.7–9.3)
NEUTROPHILS # BLD AUTO: 18.81 K/UL — HIGH (ref 1.4–6.5)
NEUTROPHILS NFR BLD AUTO: 89.3 % — HIGH (ref 42.2–75.2)
NITRITE UR-MCNC: NEGATIVE — SIGNIFICANT CHANGE UP
NRBC # BLD: 0 /100 WBCS — SIGNIFICANT CHANGE UP (ref 0–0)
PH UR: 5.5 — SIGNIFICANT CHANGE UP (ref 5–8)
PLATELET # BLD AUTO: 482 K/UL — HIGH (ref 130–400)
POTASSIUM SERPL-MCNC: 5 MMOL/L — SIGNIFICANT CHANGE UP (ref 3.5–5)
POTASSIUM SERPL-SCNC: 5 MMOL/L — SIGNIFICANT CHANGE UP (ref 3.5–5)
PROT SERPL-MCNC: 7.8 G/DL — SIGNIFICANT CHANGE UP (ref 6–8)
PROT UR-MCNC: SIGNIFICANT CHANGE UP
RBC # BLD: 5.08 M/UL — SIGNIFICANT CHANGE UP (ref 4.2–5.4)
RBC # FLD: 20.8 % — HIGH (ref 11.5–14.5)
RSV RNA NPH QL NAA+NON-PROBE: SIGNIFICANT CHANGE UP
SARS-COV-2 RNA SPEC QL NAA+PROBE: DETECTED
SODIUM SERPL-SCNC: 134 MMOL/L — LOW (ref 135–146)
SP GR SPEC: 1.04 — HIGH (ref 1.01–1.03)
TROPONIN T SERPL-MCNC: <0.01 NG/ML — SIGNIFICANT CHANGE UP
UROBILINOGEN FLD QL: SIGNIFICANT CHANGE UP
WBC # BLD: 21.08 K/UL — HIGH (ref 4.8–10.8)
WBC # FLD AUTO: 21.08 K/UL — HIGH (ref 4.8–10.8)

## 2022-11-30 PROCEDURE — 99285 EMERGENCY DEPT VISIT HI MDM: CPT | Mod: 25

## 2022-11-30 PROCEDURE — 76705 ECHO EXAM OF ABDOMEN: CPT | Mod: 26

## 2022-11-30 PROCEDURE — 74177 CT ABD & PELVIS W/CONTRAST: CPT | Mod: 26,MA

## 2022-11-30 PROCEDURE — 93010 ELECTROCARDIOGRAM REPORT: CPT

## 2022-11-30 PROCEDURE — 71275 CT ANGIOGRAPHY CHEST: CPT | Mod: 26,MA

## 2022-11-30 PROCEDURE — 71045 X-RAY EXAM CHEST 1 VIEW: CPT | Mod: 26

## 2022-11-30 RX ORDER — IBUPROFEN 200 MG
800 TABLET ORAL ONCE
Refills: 0 | Status: COMPLETED | OUTPATIENT
Start: 2022-11-30 | End: 2022-11-30

## 2022-11-30 RX ORDER — SODIUM CHLORIDE 9 MG/ML
1000 INJECTION INTRAMUSCULAR; INTRAVENOUS; SUBCUTANEOUS ONCE
Refills: 0 | Status: COMPLETED | OUTPATIENT
Start: 2022-11-30 | End: 2022-11-30

## 2022-11-30 RX ORDER — ONDANSETRON 8 MG/1
4 TABLET, FILM COATED ORAL ONCE
Refills: 0 | Status: COMPLETED | OUTPATIENT
Start: 2022-11-30 | End: 2022-11-30

## 2022-11-30 RX ADMIN — ONDANSETRON 4 MILLIGRAM(S): 8 TABLET, FILM COATED ORAL at 11:47

## 2022-11-30 RX ADMIN — Medication 800 MILLIGRAM(S): at 11:40

## 2022-11-30 RX ADMIN — Medication 800 MILLIGRAM(S): at 12:10

## 2022-11-30 RX ADMIN — SODIUM CHLORIDE 1000 MILLILITER(S): 9 INJECTION INTRAMUSCULAR; INTRAVENOUS; SUBCUTANEOUS at 11:39

## 2022-11-30 RX ADMIN — SODIUM CHLORIDE 1000 MILLILITER(S): 9 INJECTION INTRAMUSCULAR; INTRAVENOUS; SUBCUTANEOUS at 13:49

## 2022-11-30 NOTE — ED PROVIDER NOTE - PHYSICAL EXAMINATION
Vital Signs: I have reviewed the initial vital signs.  Constitutional: NAD, well-nourished, appears stated age, no acute distress.  HEENT: Airway patent, moist MM, no erythema/swelling/deformity of oral structures. EOMI, PERRLA.  CV: regular rate, regular rhythm, well-perfused extremities, 2+ b/l DP and radial pulses equal.  Lungs: +wheezes, no increased WOB.  ABD: +tender RUQ, ND, no guarding or rebound, no pulsatile mass, no hernias.   MSK: Neck supple, nontender, nl ROM, no stepoff. Chest nontender. Back nontender. Ext nontender, nl rom, no deformity.   INTEG: Skin warm, dry, no rash.  NEURO: A&Ox3, normal strength, nl sensation throughout, normal speech.   PSYCH: Calm, cooperative, normal affect and interaction.

## 2022-11-30 NOTE — ED PROVIDER NOTE - NS ED ROS FT
Constitutional: (+) fever, (+)chills  Eyes/ENT: (-) blurry vision, (-) epistaxis  Cardiovascular: (-) chest pain, (-) syncope  Respiratory: (+) cough, (+) shortness of breath  Gastrointestinal:(+)nausea, (+) vomiting, (+) diarrhea  Musculoskeletal: (-) neck pain, (-) back pain, (-) joint pain  Integumentary: (-) rash, (-) edema  Neurological: (-) headache, (-) altered mental status  Psychiatric: (-) hallucinations  Allergic/Immunologic: (-) pruritus

## 2022-11-30 NOTE — ED ADULT NURSE NOTE - NSIMPLEMENTINTERV_GEN_ALL_ED
Implemented All Universal Safety Interventions:  [place] to call system. Call bell, personal items and telephone within reach. Instruct patient to call for assistance. Room bathroom lighting operational. Non-slip footwear when patient is off stretcher. Physically safe environment: no spills, clutter or unnecessary equipment. Stretcher in lowest position, wheels locked, appropriate side rails in place.

## 2022-11-30 NOTE — ED ADULT NURSE NOTE - CHIEF COMPLAINT QUOTE
Pt sent from [facility] c/o cough x 1 month, (+) shortness of breath x 2-3 days.

## 2022-11-30 NOTE — ED PROVIDER NOTE - CLINICAL SUMMARY MEDICAL DECISION MAKING FREE TEXT BOX
patient evaluated for chest pain and abd pain, lab work showed leukocytosis with multiple infectious complaints. a ddimer was obtained given her symptoms which was positive, a ct angio and ct abd were obtained. otherwise lab work was nml patient admitted fo rfurther management.

## 2022-11-30 NOTE — ED PROVIDER NOTE - OBJECTIVE STATEMENT
37-year-old female with history of diabetes, depression, pseudoseizures, GERD presents to the ED complaining of cough, chest pain, headache, nausea, diarrhea and weakness for a couple of days.  No fever, chills or urinary symptoms. 37-year-old female with history of diabetes, depression, pseudoseizures, GERD presents to the ED complaining of cough, chest pain, headache, nausea, diarrhea and weakness for a couple of days. Patient states had covid recently. No fever, chills or urinary symptoms.

## 2022-11-30 NOTE — ED ADULT TRIAGE NOTE - CHIEF COMPLAINT QUOTE
Pt sent from [facility] c/o cough x 1 month, (+) shortness of breath x 2-3 days.

## 2022-11-30 NOTE — ED PROVIDER NOTE - ATTENDING APP SHARED VISIT CONTRIBUTION OF CARE
36 yo f with pmh of depression, pseudoseizures, gerd, dm, presents with cough, headache, nausea, diarrhea and weakness.  pt says has been going on for a few days.  denies sob.  had recent covid.  no fever, chills.  no recent travel.  no leg pain or swelling.  exam: nad, ncat, perrl, eomi, mmm, rrr, ctab, abd soft, nt, nd, aox3, cn2-12 normal, 5/5 strength all ext, sensation intact, gait normal imp: pt with sx c/w viral syndrome, has cp, will check labs, ekg, cxr, trop and ddimer due to recent covid

## 2022-11-30 NOTE — ED ADULT NURSE NOTE - OBJECTIVE STATEMENT
Patient stated "I have chest pain, I'm short of breath and I have stomach pain for one month. I was taking a z-pack but it didn't help."

## 2022-12-01 LAB
ALBUMIN SERPL ELPH-MCNC: 4 G/DL — SIGNIFICANT CHANGE UP (ref 3.5–5.2)
ALP SERPL-CCNC: 124 U/L — HIGH (ref 30–115)
ALT FLD-CCNC: 37 U/L — SIGNIFICANT CHANGE UP (ref 0–41)
ANION GAP SERPL CALC-SCNC: 9 MMOL/L — SIGNIFICANT CHANGE UP (ref 7–14)
AST SERPL-CCNC: 20 U/L — SIGNIFICANT CHANGE UP (ref 0–41)
BILIRUB SERPL-MCNC: <0.2 MG/DL — SIGNIFICANT CHANGE UP (ref 0.2–1.2)
BUN SERPL-MCNC: 7 MG/DL — LOW (ref 10–20)
CALCIUM SERPL-MCNC: 8.7 MG/DL — SIGNIFICANT CHANGE UP (ref 8.4–10.5)
CHLORIDE SERPL-SCNC: 100 MMOL/L — SIGNIFICANT CHANGE UP (ref 98–110)
CO2 SERPL-SCNC: 24 MMOL/L — SIGNIFICANT CHANGE UP (ref 17–32)
CREAT SERPL-MCNC: 0.5 MG/DL — LOW (ref 0.7–1.5)
EGFR: 124 ML/MIN/1.73M2 — SIGNIFICANT CHANGE UP
GLUCOSE BLDC GLUCOMTR-MCNC: 143 MG/DL — HIGH (ref 70–99)
GLUCOSE BLDC GLUCOMTR-MCNC: 168 MG/DL — HIGH (ref 70–99)
GLUCOSE BLDC GLUCOMTR-MCNC: 206 MG/DL — HIGH (ref 70–99)
GLUCOSE BLDC GLUCOMTR-MCNC: 213 MG/DL — HIGH (ref 70–99)
GLUCOSE BLDC GLUCOMTR-MCNC: 285 MG/DL — HIGH (ref 70–99)
GLUCOSE SERPL-MCNC: 252 MG/DL — HIGH (ref 70–99)
HCT VFR BLD CALC: 27.8 % — LOW (ref 37–47)
HCT VFR BLD CALC: 28.7 % — LOW (ref 37–47)
HGB BLD-MCNC: 7.6 G/DL — LOW (ref 12–16)
HGB BLD-MCNC: 7.8 G/DL — LOW (ref 12–16)
LACTATE SERPL-SCNC: 1.5 MMOL/L — SIGNIFICANT CHANGE UP (ref 0.7–2)
MAGNESIUM SERPL-MCNC: 1.7 MG/DL — LOW (ref 1.8–2.4)
MCHC RBC-ENTMCNC: 18.4 PG — LOW (ref 27–31)
MCHC RBC-ENTMCNC: 18.5 PG — LOW (ref 27–31)
MCHC RBC-ENTMCNC: 27.2 G/DL — LOW (ref 32–37)
MCHC RBC-ENTMCNC: 27.3 G/DL — LOW (ref 32–37)
MCV RBC AUTO: 67.5 FL — LOW (ref 81–99)
MCV RBC AUTO: 67.8 FL — LOW (ref 81–99)
NRBC # BLD: 0 /100 WBCS — SIGNIFICANT CHANGE UP (ref 0–0)
NRBC # BLD: 0 /100 WBCS — SIGNIFICANT CHANGE UP (ref 0–0)
PLATELET # BLD AUTO: 508 K/UL — HIGH (ref 130–400)
PLATELET # BLD AUTO: 537 K/UL — HIGH (ref 130–400)
POTASSIUM SERPL-MCNC: 4.4 MMOL/L — SIGNIFICANT CHANGE UP (ref 3.5–5)
POTASSIUM SERPL-SCNC: 4.4 MMOL/L — SIGNIFICANT CHANGE UP (ref 3.5–5)
PROT SERPL-MCNC: 6.9 G/DL — SIGNIFICANT CHANGE UP (ref 6–8)
RBC # BLD: 4.1 M/UL — LOW (ref 4.2–5.4)
RBC # BLD: 4.25 M/UL — SIGNIFICANT CHANGE UP (ref 4.2–5.4)
RBC # FLD: 20.3 % — HIGH (ref 11.5–14.5)
RBC # FLD: 20.5 % — HIGH (ref 11.5–14.5)
SODIUM SERPL-SCNC: 133 MMOL/L — LOW (ref 135–146)
TROPONIN T SERPL-MCNC: <0.01 NG/ML — SIGNIFICANT CHANGE UP
WBC # BLD: 10.45 K/UL — SIGNIFICANT CHANGE UP (ref 4.8–10.8)
WBC # BLD: 9.66 K/UL — SIGNIFICANT CHANGE UP (ref 4.8–10.8)
WBC # FLD AUTO: 10.45 K/UL — SIGNIFICANT CHANGE UP (ref 4.8–10.8)
WBC # FLD AUTO: 9.66 K/UL — SIGNIFICANT CHANGE UP (ref 4.8–10.8)

## 2022-12-01 PROCEDURE — 76705 ECHO EXAM OF ABDOMEN: CPT | Mod: 26

## 2022-12-01 PROCEDURE — 99223 1ST HOSP IP/OBS HIGH 75: CPT

## 2022-12-01 PROCEDURE — 99291 CRITICAL CARE FIRST HOUR: CPT

## 2022-12-01 PROCEDURE — 99253 IP/OBS CNSLTJ NEW/EST LOW 45: CPT

## 2022-12-01 RX ORDER — GLUCAGON INJECTION, SOLUTION 0.5 MG/.1ML
1 INJECTION, SOLUTION SUBCUTANEOUS ONCE
Refills: 0 | Status: DISCONTINUED | OUTPATIENT
Start: 2022-12-01 | End: 2022-12-02

## 2022-12-01 RX ORDER — DEXTROSE 50 % IN WATER 50 %
25 SYRINGE (ML) INTRAVENOUS ONCE
Refills: 0 | Status: DISCONTINUED | OUTPATIENT
Start: 2022-12-01 | End: 2022-12-02

## 2022-12-01 RX ORDER — ACETAMINOPHEN 500 MG
650 TABLET ORAL EVERY 6 HOURS
Refills: 0 | Status: DISCONTINUED | OUTPATIENT
Start: 2022-12-01 | End: 2022-12-02

## 2022-12-01 RX ORDER — OMEPRAZOLE 10 MG/1
1 CAPSULE, DELAYED RELEASE ORAL
Qty: 0 | Refills: 0 | DISCHARGE

## 2022-12-01 RX ORDER — MAGNESIUM SULFATE 500 MG/ML
2 VIAL (ML) INJECTION ONCE
Refills: 0 | Status: COMPLETED | OUTPATIENT
Start: 2022-12-01 | End: 2022-12-01

## 2022-12-01 RX ORDER — CLONAZEPAM 1 MG
0.5 TABLET ORAL
Refills: 0 | Status: DISCONTINUED | OUTPATIENT
Start: 2022-12-01 | End: 2022-12-02

## 2022-12-01 RX ORDER — SODIUM CHLORIDE 9 MG/ML
1000 INJECTION, SOLUTION INTRAVENOUS
Refills: 0 | Status: DISCONTINUED | OUTPATIENT
Start: 2022-12-01 | End: 2022-12-02

## 2022-12-01 RX ORDER — SODIUM CHLORIDE 9 MG/ML
1000 INJECTION INTRAMUSCULAR; INTRAVENOUS; SUBCUTANEOUS
Refills: 0 | Status: DISCONTINUED | OUTPATIENT
Start: 2022-12-01 | End: 2022-12-01

## 2022-12-01 RX ORDER — METOPROLOL TARTRATE 50 MG
25 TABLET ORAL
Refills: 0 | Status: DISCONTINUED | OUTPATIENT
Start: 2022-12-01 | End: 2022-12-02

## 2022-12-01 RX ORDER — ENOXAPARIN SODIUM 100 MG/ML
40 INJECTION SUBCUTANEOUS EVERY 12 HOURS
Refills: 0 | Status: DISCONTINUED | OUTPATIENT
Start: 2022-12-01 | End: 2022-12-02

## 2022-12-01 RX ORDER — HYDROXYZINE HCL 10 MG
20 TABLET ORAL DAILY
Refills: 0 | Status: DISCONTINUED | OUTPATIENT
Start: 2022-12-01 | End: 2022-12-02

## 2022-12-01 RX ORDER — DEXTROSE 50 % IN WATER 50 %
15 SYRINGE (ML) INTRAVENOUS ONCE
Refills: 0 | Status: DISCONTINUED | OUTPATIENT
Start: 2022-12-01 | End: 2022-12-02

## 2022-12-01 RX ORDER — ESCITALOPRAM OXALATE 10 MG/1
1 TABLET, FILM COATED ORAL
Qty: 0 | Refills: 1 | DISCHARGE

## 2022-12-01 RX ORDER — ONDANSETRON 8 MG/1
4 TABLET, FILM COATED ORAL EVERY 8 HOURS
Refills: 0 | Status: DISCONTINUED | OUTPATIENT
Start: 2022-12-01 | End: 2022-12-02

## 2022-12-01 RX ORDER — MAGNESIUM OXIDE 400 MG ORAL TABLET 241.3 MG
400 TABLET ORAL
Refills: 0 | Status: DISCONTINUED | OUTPATIENT
Start: 2022-12-01 | End: 2022-12-02

## 2022-12-01 RX ORDER — ASPIRIN/CALCIUM CARB/MAGNESIUM 324 MG
81 TABLET ORAL DAILY
Refills: 0 | Status: DISCONTINUED | OUTPATIENT
Start: 2022-12-01 | End: 2022-12-02

## 2022-12-01 RX ORDER — INSULIN GLARGINE 100 [IU]/ML
22 INJECTION, SOLUTION SUBCUTANEOUS EVERY MORNING
Refills: 0 | Status: DISCONTINUED | OUTPATIENT
Start: 2022-12-01 | End: 2022-12-02

## 2022-12-01 RX ORDER — ESCITALOPRAM OXALATE 10 MG/1
20 TABLET, FILM COATED ORAL DAILY
Refills: 0 | Status: DISCONTINUED | OUTPATIENT
Start: 2022-12-01 | End: 2022-12-02

## 2022-12-01 RX ORDER — INSULIN LISPRO 100/ML
VIAL (ML) SUBCUTANEOUS
Refills: 0 | Status: DISCONTINUED | OUTPATIENT
Start: 2022-12-01 | End: 2022-12-02

## 2022-12-01 RX ORDER — ALBUTEROL 90 UG/1
2 AEROSOL, METERED ORAL EVERY 6 HOURS
Refills: 0 | Status: DISCONTINUED | OUTPATIENT
Start: 2022-12-01 | End: 2022-12-02

## 2022-12-01 RX ORDER — HYDROXYZINE HCL 10 MG
20 TABLET ORAL AT BEDTIME
Refills: 0 | Status: DISCONTINUED | OUTPATIENT
Start: 2022-12-01 | End: 2022-12-02

## 2022-12-01 RX ORDER — DEXTROSE 50 % IN WATER 50 %
12.5 SYRINGE (ML) INTRAVENOUS ONCE
Refills: 0 | Status: DISCONTINUED | OUTPATIENT
Start: 2022-12-01 | End: 2022-12-02

## 2022-12-01 RX ORDER — INSULIN LISPRO 100/ML
7 VIAL (ML) SUBCUTANEOUS
Refills: 0 | Status: DISCONTINUED | OUTPATIENT
Start: 2022-12-01 | End: 2022-12-02

## 2022-12-01 RX ORDER — POLYETHYLENE GLYCOL 3350 17 G/17G
17 POWDER, FOR SOLUTION ORAL EVERY 12 HOURS
Refills: 0 | Status: DISCONTINUED | OUTPATIENT
Start: 2022-12-01 | End: 2022-12-02

## 2022-12-01 RX ORDER — SODIUM CHLORIDE 9 MG/ML
1000 INJECTION INTRAMUSCULAR; INTRAVENOUS; SUBCUTANEOUS
Refills: 0 | Status: DISCONTINUED | OUTPATIENT
Start: 2022-12-01 | End: 2022-12-02

## 2022-12-01 RX ORDER — AMITRIPTYLINE HCL 25 MG
75 TABLET ORAL AT BEDTIME
Refills: 0 | Status: DISCONTINUED | OUTPATIENT
Start: 2022-12-01 | End: 2022-12-02

## 2022-12-01 RX ORDER — SENNA PLUS 8.6 MG/1
1 TABLET ORAL AT BEDTIME
Refills: 0 | Status: DISCONTINUED | OUTPATIENT
Start: 2022-12-01 | End: 2022-12-02

## 2022-12-01 RX ORDER — SOD,AMMONIUM,POTASSIUM LACTATE
1 CREAM (GRAM) TOPICAL
Qty: 0 | Refills: 0 | DISCHARGE

## 2022-12-01 RX ORDER — HEPARIN SODIUM 5000 [USP'U]/ML
5000 INJECTION INTRAVENOUS; SUBCUTANEOUS EVERY 12 HOURS
Refills: 0 | Status: DISCONTINUED | OUTPATIENT
Start: 2022-12-01 | End: 2022-12-01

## 2022-12-01 RX ORDER — PANTOPRAZOLE SODIUM 20 MG/1
40 TABLET, DELAYED RELEASE ORAL
Refills: 0 | Status: DISCONTINUED | OUTPATIENT
Start: 2022-12-01 | End: 2022-12-02

## 2022-12-01 RX ORDER — LANOLIN ALCOHOL/MO/W.PET/CERES
3 CREAM (GRAM) TOPICAL AT BEDTIME
Refills: 0 | Status: DISCONTINUED | OUTPATIENT
Start: 2022-12-01 | End: 2022-12-02

## 2022-12-01 RX ADMIN — POLYETHYLENE GLYCOL 3350 17 GRAM(S): 17 POWDER, FOR SOLUTION ORAL at 17:24

## 2022-12-01 RX ADMIN — Medication 7 UNIT(S): at 17:26

## 2022-12-01 RX ADMIN — Medication 20 MILLIGRAM(S): at 03:48

## 2022-12-01 RX ADMIN — MAGNESIUM OXIDE 400 MG ORAL TABLET 400 MILLIGRAM(S): 241.3 TABLET ORAL at 17:23

## 2022-12-01 RX ADMIN — Medication 75 MILLIGRAM(S): at 03:48

## 2022-12-01 RX ADMIN — Medication 81 MILLIGRAM(S): at 12:20

## 2022-12-01 RX ADMIN — PANTOPRAZOLE SODIUM 40 MILLIGRAM(S): 20 TABLET, DELAYED RELEASE ORAL at 06:06

## 2022-12-01 RX ADMIN — Medication 7 UNIT(S): at 08:04

## 2022-12-01 RX ADMIN — Medication 25 GRAM(S): at 14:41

## 2022-12-01 RX ADMIN — Medication 0.5 MILLIGRAM(S): at 12:20

## 2022-12-01 RX ADMIN — SENNA PLUS 1 TABLET(S): 8.6 TABLET ORAL at 21:57

## 2022-12-01 RX ADMIN — Medication 20 MILLIGRAM(S): at 21:57

## 2022-12-01 RX ADMIN — ENOXAPARIN SODIUM 40 MILLIGRAM(S): 100 INJECTION SUBCUTANEOUS at 12:20

## 2022-12-01 RX ADMIN — SODIUM CHLORIDE 75 MILLILITER(S): 9 INJECTION INTRAMUSCULAR; INTRAVENOUS; SUBCUTANEOUS at 10:33

## 2022-12-01 RX ADMIN — Medication 1: at 17:26

## 2022-12-01 RX ADMIN — Medication 2: at 12:21

## 2022-12-01 RX ADMIN — Medication 0.5 MILLIGRAM(S): at 08:44

## 2022-12-01 RX ADMIN — HEPARIN SODIUM 5000 UNIT(S): 5000 INJECTION INTRAVENOUS; SUBCUTANEOUS at 06:06

## 2022-12-01 RX ADMIN — ENOXAPARIN SODIUM 40 MILLIGRAM(S): 100 INJECTION SUBCUTANEOUS at 21:59

## 2022-12-01 RX ADMIN — Medication 25 MILLIGRAM(S): at 06:06

## 2022-12-01 RX ADMIN — Medication 75 MILLIGRAM(S): at 21:58

## 2022-12-01 RX ADMIN — ESCITALOPRAM OXALATE 20 MILLIGRAM(S): 10 TABLET, FILM COATED ORAL at 12:20

## 2022-12-01 RX ADMIN — INSULIN GLARGINE 22 UNIT(S): 100 INJECTION, SOLUTION SUBCUTANEOUS at 09:43

## 2022-12-01 RX ADMIN — Medication 7 UNIT(S): at 12:21

## 2022-12-01 RX ADMIN — Medication 25 MILLIGRAM(S): at 17:24

## 2022-12-01 NOTE — CONSULT NOTE ADULT - ASSESSMENT
37-year-old female with history of Obesity, T1DM, DLD depression, sinus tachycardia, ?IBS, pseudoseizures, GERD presents to the ED complaining of cough, chest pain, headache, and weakness for a couple of days and nausea/abdominal pain for 1 week. Pt states she tested herself for COVID 2 days ago and was negative, subsequently found positive in the ED here. Pt states her symptoms are very similar to prior COVID infections (last 1mo ago), has remained with cough since then but has had general worsening of symptoms over the past two days so decided to come in. Pt states abdominal pain has remained as a fullness and tenderness with pressure over the area for the past week, associated with nausea. Pt denies lightheadedness, dizziness, palpitations, vomiting, changes to stools or urination. 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 no gallstones or GBW thickening or edema. fecal retention.  - MRI liver protocol (2021): no cirrhosis. T2 hyperintense lesion in right inferior hepatic lobe. no size mentioned. limited by motion artifact  - Medications reviewed  - Liver injury workup: negative HAV/HBV, SETH, AMA, LKM. negative hemochromatosis    Rec  - Please obtain LFTs and INR qdaily  - Please send HCV a/b  - Please send Iron studies  - please send  ACE level  - Please send  anti-Smooth Muscle Ab type 1,  Anti-soluble liver Ag, immunoglobulin panel  - Please send CMV PCR, EBV PCR, HSV IgM  - Please send Serum Drug screen and Utox  - Please avoid hepatotoxic medications    #Constipation  - please give miralax and senna  - enemas prn if no bms while on oral laxatives    #Hx of Liver lesion  - - MRI liver protocol (2021): no cirrhosis. T2 hyperintense lesion in right inferior hepatic lobe. no size mentioned. limited by motion artifact    Plan  - outpatient MRI liver protocol with MRCP    #Iron Deficiency anemia  #High risk for Colon cancer d/t father   - outpatient EGD and colonoscopy       37-year-old female with history of Obesity, T1DM, DLD depression, sinus tachycardia, ?IBS, pseudoseizures, GERD presents to the ED complaining of cough, chest pain, headache, and weakness for a couple of days and nausea/abdominal pain for 1 week. Pt states she tested herself for COVID 2 days ago and was negative, subsequently found positive in the ED here. Pt states her symptoms are very similar to prior COVID infections (last 1mo ago), has remained with cough since then but has had general worsening of symptoms over the past two days so decided to come in. Pt states abdominal pain has remained as a fullness and tenderness with pressure over the area for the past week, associated with nausea. Pt denies lightheadedness, dizziness, palpitations, vomiting, changes to stools or urination. 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 no gallstones or GBW thickening or edema. fecal retention.  - MRI liver protocol (2021): no cirrhosis. T2 hyperintense lesion in right inferior hepatic lobe. no size mentioned. limited by motion artifact  - Medications reviewed  - Liver injury workup: negative HAV/HBV, SETH, AMA, LKM. negative hemochromatosis    Rec  - Please obtain LFTs and INR qdaily  - Please send HCV a/b  - Please send Iron studies  - please send  ACE level  - Please send  anti-Smooth Muscle Ab type 1,  Anti-soluble liver Ag, immunoglobulin panel  - Please send CMV PCR, EBV PCR, HSV IgM  - Please send Serum Drug screen and Utox  - Please avoid hepatotoxic medications  - Follow up with our [facility] located at [address] [place] [address], [id number]. Telephone No: [phone]  - diet and exercise counselling provided    #Constipation  - please give miralax and senna  - enemas prn if no bms while on oral laxatives    #Hx of Liver lesion  - - MRI liver protocol (2021): no cirrhosis. T2 hyperintense lesion in right inferior hepatic lobe. no size mentioned. limited by motion artifact    Plan  - outpatient MRI liver protocol with MRCP    #Iron Deficiency anemia  #High risk for Colon cancer d/t father   - outpatient EGD and colonoscopy

## 2022-12-01 NOTE — CONSULT NOTE ADULT - ATTENDING COMMENTS
37 y o  F with obesity HLD T2DM admitted with cough chest pain headache nausea and abdominal pain for a week seen for elevated liver tests.   Looks well  Abdomen soft non tender no hepatomegaly  Mild elevation of transaminases in past. Now has mild elevation of ALP along with transaminases which is improving.  Current rise may be related to COVID but NAFLD is likely   Can follow up as OP.

## 2022-12-01 NOTE — H&P ADULT - ASSESSMENT
37-year-old female with history of T1DD, depression, sinus tachycardia, ?IBS, pseudoseizures, GERD presents to the ED complaining of cough, chest pain, headache, and weakness for a couple of days and nausea/abdominal pain for 1 week.  37-year-old female with history of T1DD, depression, sinus tachycardia, ?IBS, pseudoseizures, GERD presents to the ED complaining of cough, chest pain, headache, and weakness for a couple of days and nausea/abdominal pain for 1 week.     #Acute Cholecystitis  #NAFLD    #Mild COVID-19 infection    #Sinus Tachycardia  #?hx Angina  #?Left fascicular block    # 37-year-old female with history of T1DD, depression, sinus tachycardia, ?IBS, pseudoseizures, GERD presents to the ED complaining of cough, chest pain, headache, and weakness for a couple of days and nausea/abdominal pain for 1 week.     #Acute Cholecystitis  #Leukocytosis  #NAFLD  -CTAP: no GB wall stranding but fatty infiltrates of liver  -ED US: GB wall 2.2mm, CBD 5.6mm  -pt with mild transaminitis on admission, franks +, tenderness with palpation for the past week  -f/u RUQ US  -withhold ABx for now, monitor fever curve and WBC  -f/u hepatology eval    #Mild COVID-19 infection  -similar symptoms to past infections  -pt recommended not to get COVID vaccine due to flu vaccine reaction  -off O2  -symptomatic treatment  -low dose IVF x12hr  -c/w albuterol PRN  -f/u infl markers    #Chest pain  #Sinus Tachycardia  #?hx Angina  #?Left fascicular block  -Likely secondary to COVID infection and chronic cough x1mo since last COVID infection  -Trop <0.01 x1  -EKG: sinus tachycardia with ?L fascicular block  -Pt states sinus tachycardia of unknown origin, follows Dr. Christian  -c/w metoprolol  -c/w ASA  -trend trop x2more    #Type I diabetes mellitus  -on 44u through insulin pump daily  -will start B/B/SS insulin, f/u daily needs    #Depression  -c/w hydroxyzine, clonazepam, lexapro, amitriptyline as prescribed outpt    #DVT ppx  -hep subq    #GERD  #GI ppx  -protonix therapeutic interchange     #Diet  -CC    #Activity  -IAT    #Code  -full code    #Dispo  -24-48hr, hep eval + WBC monitoring

## 2022-12-01 NOTE — H&P ADULT - ATTENDING COMMENTS
37-year-old female with history of T1DD, depression, sinus tachycardia, ?IBS, pseudoseizures, GERD presents to the ED complaining of cough, chest pain, headache, and weakness for a couple of days and nausea/abdominal pain for 1 week. Pt states she tested herself for COVID 2 days ago and was negative, subsequently found positive in the ED here. Pt states her symptoms are very similar to prior COVID infections (last 1mo ago), has remained with cough since then but has had general worsening of symptoms over the past two days so decided to come in. Pt states abdominal pain has remained as a fullness and tenderness with pressure over the area for the past week, associated with nausea. Pt denies lightheadedness, dizziness, palpitations, vomiting, changes to stools or urination.    In ED, VSS except for tachycardia up to the 120s. EKG showed Sinus tachycardia with ?L fascicular block. Pt had D-Dimer 1049, received CTangio chest PE protocol without PE. CTAP revealed fatty liver with RLQ lymphadenopathy. Pt received NS, zofran, ibuprofen with relief of symptoms. Labs relevant for WBC 21k, Trop <0.01 x1, HG 9.2 (baseline). Transaminitis (153/47/52) and lactate 2.4. UA relevant for high glucose and possible dehydration given specific gravity of 1.036. pt COVID +. Pt admitted for leukocytosis.    Interval history. Pt is poor/fair historian. This afternoon feels better. Tolerating diet. No further CP, SOB. Reports coming in due to persistent cough x1 month and chronic abdominal pain. +BMs at home. No further nuasea, denies D/C/F/chills, urinary Sx    ROS: all systems unremarkable except as above.     General: NAD. Looks stated age.  HEENT: clean oropharynx, PERRLA EOMI, no LAD  Neck: trachea midline, no thyromegaly  CV: S1 S2; no m/r/g  Resp: decreased breath sounds at base  GI: b/l flank tenderness, ND/S +BS  MS: no clubbing/cyanosis/edema, +pulses  Neuro: motor, sensory intact; + reflexes  Skin: no rashes, nl turgor  Psychiatric: AA0x3 w/ fair insight and judgement    EKG - nonspecific changes (my read)  Chart and consultant notes personally reviewed.  Care Discussed with Consultants/Other Providers/ Housestaff [ x] YES [ ] NO   Radiology, labs, old records personally reviewed.    #Leukocytosis/Elevated lactate/Chronic abd pain  #NAFLD  -CTAP: no GB wall stranding but fatty infiltrates of liver  -ED US: GB wall 2.2mm, CBD 5.6mm  -pt with mild transaminitis on admission,   -RUQ US: no acute pathology  -withhold ABx for now, monitor fever curve and WBC  -needs outpt EGD, c-scopy  - IVFs  - U/A negative but check renal/bladder U/s    #?Old COVID-19 infection / Persistent cough  -c/w albuterol     #Chronic Sinus Tachycardia  -Trop <0.01 x1  -EKG: sinus tachycardia with ?L fascicular block  -Pt states sinus tachycardia of unknown origin, follows Dr. Christian  -c/w metoprolol  -c/w ASA  -trend trop     #Type I diabetes mellitus Uncontrolled  -Insulin  -goal -180    #Depression  -c/w hydroxyzine, clonazepam, lexapro, amitriptyline as prescribed outpt    #DVT ppx  -hep subq  check LE doppler due to high d-dimer    #GERD  #GI ppx  -protonix therapeutic interchange     #Morbid Obesity  wt loss advised    #Progress Note Handoff  Pending: Clinical improvement and stability__x___LE doppler, Renal, bladder U/s, Resolution of leukocytosis and LA___  Pt/Family discussion: Pt/mother informed and agree with the current plan  Disposition: Home___    My note supersedes the residents note should a discrepancy arise.    Chart and notes personally reviewed.  Care Discussed with Consultants/Other Providers/ Housestaff [ x] YES [ ] NO   Radiology, labs, old records personally reviewed.    discussed w/ housestaff, nursing, case management, mother

## 2022-12-01 NOTE — H&P ADULT - NSHPLABSRESULTS_GEN_ALL_CORE
Vital Signs Last 24 Hrs  T(C): 36.2 (01 Dec 2022 00:14), Max: 37.6 (2022 10:07)  T(F): 97.2 (01 Dec 2022 00:14), Max: 99.7 (2022 10:07)  HR: 110 (01 Dec 2022 00:14) (104 - 120)  BP: 135/62 (01 Dec 2022 00:14) (129/65 - 147/70)  BP(mean): 99 (2022 17:32) (94 - 99)  RR: 20 (2022 17:32) (20 - 22)  SpO2: 99% (01 Dec 2022 00:14) (99% - 100%)    Parameters below as of 01 Dec 2022 00:14  Patient On (Oxygen Delivery Method): room air      CBC Full  -  ( 2022 12:17 )  WBC Count : 21.08 K/uL  RBC Count : 5.08 M/uL  Hemoglobin : 9.2 g/dL  Hematocrit : 34.2 %  Platelet Count - Automated : 482 K/uL  Mean Cell Volume : 67.3 fL  Mean Cell Hemoglobin : 18.1 pg  Mean Cell Hemoglobin Concentration : 26.9 g/dL  Auto Neutrophil # : 18.81 K/uL  Auto Lymphocyte # : 1.06 K/uL  Auto Monocyte # : 1.01 K/uL  Auto Eosinophil # : 0.00 K/uL  Auto Basophil # : 0.07 K/uL  Auto Neutrophil % : 89.3 %  Auto Lymphocyte % : 5.0 %  Auto Monocyte % : 4.8 %  Auto Eosinophil % : 0.0 %  Auto Basophil % : 0.3 %        134<L>  |  97<L>  |  11  ----------------------------<  234<H>  5.0   |  23  |  0.6<L>    Ca    9.2      2022 12:17    TPro  7.8  /  Alb  4.5  /  TBili  0.2  /  DBili  x   /  AST  47<H>  /  ALT  52<H>  /  AlkPhos  153<H>  1130    LIVER FUNCTIONS - ( 2022 12:17 )  Alb: 4.5 g/dL / Pro: 7.8 g/dL / ALK PHOS: 153 U/L / ALT: 52 U/L / AST: 47 U/L / GGT: x           Urinalysis Basic - ( 2022 13:08 )    Color: Light Yellow / Appearance: Clear / S.036 / pH: x  Gluc: x / Ketone: Negative  / Bili: Negative / Urobili: <2 mg/dL   Blood: x / Protein: Trace / Nitrite: Negative   Leuk Esterase: Negative / RBC: x / WBC x   Sq Epi: x / Non Sq Epi: x / Bacteria: x

## 2022-12-01 NOTE — H&P ADULT - HISTORY OF PRESENT ILLNESS
37-year-old female with history of T1DD, depression, sinus tachycardia, ?IBS, pseudoseizures, GERD presents to the ED complaining of cough, chest pain, headache, and weakness for a couple of days and nausea/abdominal pain for 1 week. Pt states she tested herself for COVID 2 days ago and was negative, subsequently found positive in the ED here. Pt states her symptoms are very similar to prior COVID infections (last 1mo ago), has remained with cough since then but has had general worsening of symptoms over the past two days so decided to come in. Pt states abdominal pain has remained as a fullness and tenderness with pressure over the area for the past week, associated with nausea. Pt denies lightheadedness, dizziness, palpitations, vomiting, changes to stools or urination.    In ED, VSS except for tachycardia up to the 120s. EKG showed NSR with ?L fascicular block. Pt had D-Dimer 1049, received CTangio chest PE protocol without PE. CTAP revealed fatty liver with RLQ lymphadenopathy. Pt received NS, zofran, ibuprofen with relief of symptoms. Labs relevant for WBC 21k, Trop <0.01 x1, HG 9.2 (baseline). Transaminitis (153/47/52) and lactate 2.4. UA relevant for high glucose and possible dehydration given specific gravity of 1.036. pt COVID +. Pt admitted for leukocytosis 37-year-old female with history of T1DD, depression, sinus tachycardia, ?IBS, pseudoseizures, GERD presents to the ED complaining of cough, chest pain, headache, and weakness for a couple of days and nausea/abdominal pain for 1 week. Pt states she tested herself for COVID 2 days ago and was negative, subsequently found positive in the ED here. Pt states her symptoms are very similar to prior COVID infections (last 1mo ago), has remained with cough since then but has had general worsening of symptoms over the past two days so decided to come in. Pt states abdominal pain has remained as a fullness and tenderness with pressure over the area for the past week, associated with nausea. Pt denies lightheadedness, dizziness, palpitations, vomiting, changes to stools or urination.    In ED, VSS except for tachycardia up to the 120s. EKG showed Sinus tachycardia with ?L fascicular block. Pt had D-Dimer 1049, received CTangio chest PE protocol without PE. CTAP revealed fatty liver with RLQ lymphadenopathy. Pt received NS, zofran, ibuprofen with relief of symptoms. Labs relevant for WBC 21k, Trop <0.01 x1, HG 9.2 (baseline). Transaminitis (153/47/52) and lactate 2.4. UA relevant for high glucose and possible dehydration given specific gravity of 1.036. pt COVID +. Pt admitted for leukocytosis.

## 2022-12-01 NOTE — PATIENT PROFILE ADULT - FALL HARM RISK - HARM RISK INTERVENTIONS

## 2022-12-01 NOTE — H&P ADULT - NSHPPHYSICALEXAM_GEN_ALL_CORE
GENERAL: AAOx4. Overweight, laying in bed appearing in no acute distress  HEENT: No FNDs, atraumatic, normocephalic, moist mucus membranes  LUNGS: Clear to auscultation bilaterally  HEART: S1/S2. No heaves or thrills  ABD: Obese, Rojo +. Bowel sounds present  EXT/NEURO: 5/5 strength in all extremity joints. Sensation and ROM grossly intact.  SKIN: No breaks, erythema, edema

## 2022-12-01 NOTE — CONSULT NOTE ADULT - SUBJECTIVE AND OBJECTIVE BOX
Gastroenterology Consultation:    Patient is a 37y old  Female who presents with a chief complaint of Leukocytosis (01 Dec 2022 00:54)        Admitted on: 11-30-22      HPI:    37-year-old female with history of Obesity, T1DM, DLD, depression, sinus tachycardia, ?IBS, pseudoseizures, GERD presents to the ED complaining of cough, chest pain, headache, and weakness for a couple of days and nausea/abdominal pain for 1 week. Pt states she tested herself for COVID 2 days ago and was negative, subsequently found positive in the ED here. Pt states her symptoms are very similar to prior COVID infections (last 1mo ago), has remained with cough since then but has had general worsening of symptoms over the past two days so decided to come in. Pt states abdominal pain has remained as a fullness and tenderness with pressure over the area for the past week, associated with nausea. Pt denies lightheadedness, dizziness, palpitations, vomiting, changes to stools or urination. Pt was noted to have elevated liver enzymes and hepatology was consulted for management    Prior EGD: none     Prior Colonoscopy: none      PAST MEDICAL & SURGICAL HISTORY:  Neuropathy  right lower extremity, vaginal      Type 1 diabetes      Degenerative disc disease, thoracic      Urinary tract infection, recurrent      Gastroesophageal reflux disease, esophagitis presence not specified      Intractable headache, unspecified chronicity pattern, unspecified headache type      Major depressive disorder with single episode, in full remission  previously treated with zyprexa, celexa and lexapro      Tremor of right hand      Tachycardia      Spinal stenosis      No significant past surgical history            FAMILY HISTORY:  famiyl hx of liver cirrhosis and HCC and Colon cancer    Social History:  Tobacco: denies   Alcohol: denhies   Drugs: denies    Home Medications:  amitriptyline 75 mg oral tablet: 1 tab(s) orally once a day (at bedtime) (01 Dec 2022 01:15)  aspirin 81 mg oral tablet, chewable: 1 tab(s) orally once a day (01 Dec 2022 01:15)  clonazePAM 0.5 mg oral tablet: 1 tab(s) orally 2 times a day at 8AM and 12 noon (01 Dec 2022 01:15)  dicyclomine 20 mg oral tablet: 1 tab(s) orally 3 times a day, As Needed for pain (01 Dec 2022 01:15)  hydrOXYzine hydrochloride 10 mg oral tablet: 2 tab(s) orally once a day (at bedtime) (01 Dec 2022 01:15)  indomethacin 50 mg oral capsule: 1 tab(s) orally once a day as needed for headache (01 Dec 2022 01:15)  Lexapro 20 mg oral tablet: 1 tab(s) orally once a day (01 Dec 2022 01:15)  metoprolol tartrate 25 mg oral tablet: 1 tab(s) orally 2 times a day (01 Dec 2022 01:15)  PriLOSEC 20 mg oral delayed release capsule: 1 cap(s) orally once a day (01 Dec 2022 01:15)  Senna 8.6 mg oral tablet: 1 tab(s) orally 2 times a day, As Needed for constipation (01 Dec 2022 01:15)  Zofran 4 mg oral tablet: 1 tab(s) orally every 8 hours, As Needed for pain (01 Dec 2022 01:15)        MEDICATIONS  (STANDING):  amitriptyline 75 milliGRAM(s) Oral at bedtime  aspirin  chewable 81 milliGRAM(s) Oral daily  clonazePAM  Tablet 0.5 milliGRAM(s) Oral <User Schedule>  dextrose 5%. 1000 milliLiter(s) (50 mL/Hr) IV Continuous <Continuous>  dextrose 5%. 1000 milliLiter(s) (100 mL/Hr) IV Continuous <Continuous>  dextrose 50% Injectable 25 Gram(s) IV Push once  dextrose 50% Injectable 12.5 Gram(s) IV Push once  dextrose 50% Injectable 25 Gram(s) IV Push once  enoxaparin Injectable 40 milliGRAM(s) SubCutaneous every 12 hours  escitalopram 20 milliGRAM(s) Oral daily  glucagon  Injectable 1 milliGRAM(s) IntraMuscular once  hydrOXYzine hydrochloride 20 milliGRAM(s) Oral at bedtime  insulin glargine Injectable (LANTUS) 22 Unit(s) SubCutaneous every morning  insulin lispro (ADMELOG) corrective regimen sliding scale   SubCutaneous three times a day before meals  insulin lispro Injectable (ADMELOG) 7 Unit(s) SubCutaneous three times a day before meals  magnesium sulfate  IVPB 2 Gram(s) IV Intermittent once  metoprolol tartrate 25 milliGRAM(s) Oral two times a day  pantoprazole    Tablet 40 milliGRAM(s) Oral before breakfast  senna 1 Tablet(s) Oral at bedtime  sodium chloride 0.9%. 1000 milliLiter(s) (75 mL/Hr) IV Continuous <Continuous>    MEDICATIONS  (PRN):  acetaminophen     Tablet .. 650 milliGRAM(s) Oral every 6 hours PRN Temp greater or equal to 38C (100.4F), Mild Pain (1 - 3)  albuterol    90 MICROgram(s) HFA Inhaler 2 Puff(s) Inhalation every 6 hours PRN Shortness of Breath and/or Wheezing  dextrose Oral Gel 15 Gram(s) Oral once PRN Blood Glucose LESS THAN 70 milliGRAM(s)/deciliter  dicyclomine 20 milliGRAM(s) Oral three times a day before meals PRN nausea  hydrOXYzine hydrochloride 20 milliGRAM(s) Oral daily PRN Anxiety  melatonin 3 milliGRAM(s) Oral at bedtime PRN Insomnia  ondansetron    Tablet 4 milliGRAM(s) Oral every 8 hours PRN Nausea and/or Vomiting      Allergies  amoxicillin (Hives)  Ceclor (Rash)  Cipro (Other)  clindamycin (Hives; Nephrotoxicity)  gabapentin (Other)  Influenza Virus Vaccine, H5N1, Inactivated (Other)  penicillins (Hives)      Review of Systems:   Constitutional:  No Fever, No Chills  ENT/Mouth:  No Hearing Changes,  No Difficulty Swallowing  Eyes:  No Eye Pain, No Vision Changes  Cardiovascular:  No Chest Pain, No Palpitations  Respiratory:  No Cough, No Dyspnea  Gastrointestinal:  As described in HPI          Physical Examination:  T(C): 37.1 (12-01-22 @ 13:25), Max: 37.1 (12-01-22 @ 13:25)  HR: 101 (12-01-22 @ 13:25) (101 - 110)  BP: 123/65 (12-01-22 @ 13:25) (123/65 - 147/70)  RR: 18 (12-01-22 @ 05:23) (18 - 20)  SpO2: 100% (12-01-22 @ 13:40) (99% - 100%)  Height (cm): 162.6 (12-01-22 @ 05:23)  Weight (kg): 83.5 (12-01-22 @ 05:23)        GENERAL: AAOx3, no acute distress.  HEAD:  Atraumatic, Normocephalic  EYES: conjunctiva and sclera clear  CHEST/LUNG: Clear to auscultation bilaterally; No wheeze, rhonchi, or rales  HEART: Regular rate and rhythm; normal S1, S2, No murmurs.  ABDOMEN: Soft, LLQ tenderness, nondistended; Bowel sounds present  SKIN: Intact, no jaundice        Data:                        7.8    9.66  )-----------( 537      ( 01 Dec 2022 12:55 )             28.7     Hgb Trend:  7.8  12-01-22 @ 12:55  9.2  11-30-22 @ 12:17      12-01    133<L>  |  100  |  7<L>  ----------------------------<  252<H>  4.4   |  24  |  0.5<L>    Ca    8.7      01 Dec 2022 12:55  Mg     1.7     12-01    TPro  6.9  /  Alb  4.0  /  TBili  <0.2  /  DBili  x   /  AST  20  /  ALT  37  /  AlkPhos  124<H>  12-01    Liver panel trend:  TBili <0.2   /   AST 20   /   ALT 37   /   AlkP 124   /   Tptn 6.9   /   Alb 4.0    /   DBili --      12-01  TBili 0.2   /   AST 47   /   ALT 52   /   AlkP 153   /   Tptn 7.8   /   Alb 4.5    /   DBili --      11-30              Radiology:

## 2022-12-02 ENCOUNTER — TRANSCRIPTION ENCOUNTER (OUTPATIENT)
Age: 37
End: 2022-12-02

## 2022-12-02 VITALS — RESPIRATION RATE: 20 BRPM | HEART RATE: 109 BPM | DIASTOLIC BLOOD PRESSURE: 67 MMHG | SYSTOLIC BLOOD PRESSURE: 130 MMHG

## 2022-12-02 LAB
ALBUMIN SERPL ELPH-MCNC: 4 G/DL — SIGNIFICANT CHANGE UP (ref 3.5–5.2)
ALP SERPL-CCNC: 120 U/L — HIGH (ref 30–115)
ALT FLD-CCNC: 35 U/L — SIGNIFICANT CHANGE UP (ref 0–41)
ANION GAP SERPL CALC-SCNC: 10 MMOL/L — SIGNIFICANT CHANGE UP (ref 7–14)
AST SERPL-CCNC: 16 U/L — SIGNIFICANT CHANGE UP (ref 0–41)
BASOPHILS # BLD AUTO: 0.06 K/UL — SIGNIFICANT CHANGE UP (ref 0–0.2)
BASOPHILS NFR BLD AUTO: 0.6 % — SIGNIFICANT CHANGE UP (ref 0–1)
BILIRUB SERPL-MCNC: 0.2 MG/DL — SIGNIFICANT CHANGE UP (ref 0.2–1.2)
BUN SERPL-MCNC: 8 MG/DL — LOW (ref 10–20)
CALCIUM SERPL-MCNC: 8.8 MG/DL — SIGNIFICANT CHANGE UP (ref 8.4–10.4)
CHLORIDE SERPL-SCNC: 101 MMOL/L — SIGNIFICANT CHANGE UP (ref 98–110)
CO2 SERPL-SCNC: 23 MMOL/L — SIGNIFICANT CHANGE UP (ref 17–32)
CREAT SERPL-MCNC: 0.5 MG/DL — LOW (ref 0.7–1.5)
EGFR: 124 ML/MIN/1.73M2 — SIGNIFICANT CHANGE UP
EOSINOPHIL # BLD AUTO: 0.13 K/UL — SIGNIFICANT CHANGE UP (ref 0–0.7)
EOSINOPHIL NFR BLD AUTO: 1.2 % — SIGNIFICANT CHANGE UP (ref 0–8)
FERRITIN SERPL-MCNC: 6 NG/ML — LOW (ref 15–150)
GLUCOSE BLDC GLUCOMTR-MCNC: 124 MG/DL — HIGH (ref 70–99)
GLUCOSE BLDC GLUCOMTR-MCNC: 279 MG/DL — HIGH (ref 70–99)
GLUCOSE BLDC GLUCOMTR-MCNC: 337 MG/DL — HIGH (ref 70–99)
GLUCOSE SERPL-MCNC: 366 MG/DL — HIGH (ref 70–99)
HCT VFR BLD CALC: 30 % — LOW (ref 37–47)
HGB BLD-MCNC: 8.2 G/DL — LOW (ref 12–16)
IMM GRANULOCYTES NFR BLD AUTO: 0.5 % — HIGH (ref 0.1–0.3)
IRON SATN MFR SERPL: 22 UG/DL — LOW (ref 35–150)
IRON SATN MFR SERPL: 7 % — LOW (ref 15–50)
LACTATE SERPL-SCNC: 1.3 MMOL/L — SIGNIFICANT CHANGE UP (ref 0.7–2)
LYMPHOCYTES # BLD AUTO: 2.19 K/UL — SIGNIFICANT CHANGE UP (ref 1.2–3.4)
LYMPHOCYTES # BLD AUTO: 20.4 % — LOW (ref 20.5–51.1)
MAGNESIUM SERPL-MCNC: 1.9 MG/DL — SIGNIFICANT CHANGE UP (ref 1.8–2.4)
MCHC RBC-ENTMCNC: 18.2 PG — LOW (ref 27–31)
MCHC RBC-ENTMCNC: 27.3 G/DL — LOW (ref 32–37)
MCV RBC AUTO: 66.7 FL — LOW (ref 81–99)
MONOCYTES # BLD AUTO: 0.77 K/UL — HIGH (ref 0.1–0.6)
MONOCYTES NFR BLD AUTO: 7.2 % — SIGNIFICANT CHANGE UP (ref 1.7–9.3)
NEUTROPHILS # BLD AUTO: 7.53 K/UL — HIGH (ref 1.4–6.5)
NEUTROPHILS NFR BLD AUTO: 70.1 % — SIGNIFICANT CHANGE UP (ref 42.2–75.2)
NRBC # BLD: 0 /100 WBCS — SIGNIFICANT CHANGE UP (ref 0–0)
PLATELET # BLD AUTO: 509 K/UL — HIGH (ref 130–400)
POTASSIUM SERPL-MCNC: 5 MMOL/L — SIGNIFICANT CHANGE UP (ref 3.5–5)
POTASSIUM SERPL-SCNC: 5 MMOL/L — SIGNIFICANT CHANGE UP (ref 3.5–5)
PROT SERPL-MCNC: 7 G/DL — SIGNIFICANT CHANGE UP (ref 6–8)
RBC # BLD: 4.5 M/UL — SIGNIFICANT CHANGE UP (ref 4.2–5.4)
RBC # FLD: 20.4 % — HIGH (ref 11.5–14.5)
SODIUM SERPL-SCNC: 134 MMOL/L — LOW (ref 135–146)
TIBC SERPL-MCNC: 326 UG/DL — SIGNIFICANT CHANGE UP (ref 220–430)
UIBC SERPL-MCNC: 304 UG/DL — SIGNIFICANT CHANGE UP (ref 110–370)
WBC # BLD: 10.73 K/UL — SIGNIFICANT CHANGE UP (ref 4.8–10.8)
WBC # FLD AUTO: 10.73 K/UL — SIGNIFICANT CHANGE UP (ref 4.8–10.8)

## 2022-12-02 PROCEDURE — 93970 EXTREMITY STUDY: CPT | Mod: 26

## 2022-12-02 PROCEDURE — 99233 SBSQ HOSP IP/OBS HIGH 50: CPT

## 2022-12-02 PROCEDURE — 76770 US EXAM ABDO BACK WALL COMP: CPT | Mod: 26

## 2022-12-02 RX ORDER — MAGNESIUM OXIDE 400 MG ORAL TABLET 241.3 MG
1 TABLET ORAL
Qty: 30 | Refills: 0
Start: 2022-12-02 | End: 2022-12-31

## 2022-12-02 RX ORDER — ASPIRIN/CALCIUM CARB/MAGNESIUM 324 MG
1 TABLET ORAL
Qty: 30 | Refills: 1
Start: 2022-12-02 | End: 2023-01-30

## 2022-12-02 RX ORDER — INSULIN GLARGINE 100 [IU]/ML
25 INJECTION, SOLUTION SUBCUTANEOUS EVERY MORNING
Refills: 0 | Status: DISCONTINUED | OUTPATIENT
Start: 2022-12-02 | End: 2022-12-02

## 2022-12-02 RX ORDER — ALBUTEROL 90 UG/1
2 AEROSOL, METERED ORAL
Qty: 18 | Refills: 0
Start: 2022-12-02 | End: 2022-12-08

## 2022-12-02 RX ORDER — IRON SUCROSE 20 MG/ML
200 INJECTION, SOLUTION INTRAVENOUS ONCE
Refills: 0 | Status: COMPLETED | OUTPATIENT
Start: 2022-12-02 | End: 2022-12-02

## 2022-12-02 RX ORDER — INSULIN LISPRO 100/ML
9 VIAL (ML) SUBCUTANEOUS
Refills: 0 | Status: DISCONTINUED | OUTPATIENT
Start: 2022-12-02 | End: 2022-12-02

## 2022-12-02 RX ORDER — BUDESONIDE AND FORMOTEROL FUMARATE DIHYDRATE 160; 4.5 UG/1; UG/1
2 AEROSOL RESPIRATORY (INHALATION)
Qty: 1 | Refills: 0
Start: 2022-12-02

## 2022-12-02 RX ORDER — POLYETHYLENE GLYCOL 3350 17 G/17G
17 POWDER, FOR SOLUTION ORAL
Qty: 1020 | Refills: 0
Start: 2022-12-02 | End: 2022-12-31

## 2022-12-02 RX ORDER — SENNA PLUS 8.6 MG/1
1 TABLET ORAL
Qty: 0 | Refills: 0 | DISCHARGE

## 2022-12-02 RX ORDER — INSULIN LISPRO 100/ML
8 VIAL (ML) SUBCUTANEOUS
Refills: 0 | Status: DISCONTINUED | OUTPATIENT
Start: 2022-12-02 | End: 2022-12-02

## 2022-12-02 RX ORDER — ACETAMINOPHEN 500 MG
2 TABLET ORAL
Qty: 0 | Refills: 0 | DISCHARGE
Start: 2022-12-02

## 2022-12-02 RX ORDER — INDOMETHACIN 50 MG
1 CAPSULE ORAL
Qty: 0 | Refills: 0 | DISCHARGE

## 2022-12-02 RX ORDER — BUDESONIDE AND FORMOTEROL FUMARATE DIHYDRATE 160; 4.5 UG/1; UG/1
2 AEROSOL RESPIRATORY (INHALATION)
Refills: 0 | Status: DISCONTINUED | OUTPATIENT
Start: 2022-12-02 | End: 2022-12-02

## 2022-12-02 RX ORDER — SENNA PLUS 8.6 MG/1
1 TABLET ORAL
Qty: 60 | Refills: 0
Start: 2022-12-02 | End: 2022-12-31

## 2022-12-02 RX ADMIN — Medication 0.5 MILLIGRAM(S): at 07:40

## 2022-12-02 RX ADMIN — Medication 81 MILLIGRAM(S): at 12:19

## 2022-12-02 RX ADMIN — Medication 9 UNIT(S): at 12:15

## 2022-12-02 RX ADMIN — Medication 3: at 12:14

## 2022-12-02 RX ADMIN — ENOXAPARIN SODIUM 40 MILLIGRAM(S): 100 INJECTION SUBCUTANEOUS at 05:33

## 2022-12-02 RX ADMIN — MAGNESIUM OXIDE 400 MG ORAL TABLET 400 MILLIGRAM(S): 241.3 TABLET ORAL at 12:19

## 2022-12-02 RX ADMIN — Medication 25 MILLIGRAM(S): at 17:24

## 2022-12-02 RX ADMIN — ESCITALOPRAM OXALATE 20 MILLIGRAM(S): 10 TABLET, FILM COATED ORAL at 12:18

## 2022-12-02 RX ADMIN — MAGNESIUM OXIDE 400 MG ORAL TABLET 400 MILLIGRAM(S): 241.3 TABLET ORAL at 07:40

## 2022-12-02 RX ADMIN — POLYETHYLENE GLYCOL 3350 17 GRAM(S): 17 POWDER, FOR SOLUTION ORAL at 05:33

## 2022-12-02 RX ADMIN — IRON SUCROSE 110 MILLIGRAM(S): 20 INJECTION, SOLUTION INTRAVENOUS at 11:12

## 2022-12-02 RX ADMIN — Medication 4: at 07:55

## 2022-12-02 RX ADMIN — INSULIN GLARGINE 22 UNIT(S): 100 INJECTION, SOLUTION SUBCUTANEOUS at 07:57

## 2022-12-02 RX ADMIN — Medication 25 MILLIGRAM(S): at 05:32

## 2022-12-02 RX ADMIN — Medication 7 UNIT(S): at 07:56

## 2022-12-02 RX ADMIN — Medication 9 UNIT(S): at 17:23

## 2022-12-02 RX ADMIN — BUDESONIDE AND FORMOTEROL FUMARATE DIHYDRATE 2 PUFF(S): 160; 4.5 AEROSOL RESPIRATORY (INHALATION) at 13:19

## 2022-12-02 RX ADMIN — PANTOPRAZOLE SODIUM 40 MILLIGRAM(S): 20 TABLET, DELAYED RELEASE ORAL at 06:50

## 2022-12-02 RX ADMIN — Medication 0.5 MILLIGRAM(S): at 12:18

## 2022-12-02 RX ADMIN — MAGNESIUM OXIDE 400 MG ORAL TABLET 400 MILLIGRAM(S): 241.3 TABLET ORAL at 17:25

## 2022-12-02 NOTE — DISCHARGE NOTE NURSING/CASE MANAGEMENT/SOCIAL WORK - PATIENT PORTAL LINK FT
You can access the FollowMyHealth Patient Portal offered by [facility] by registering at the following website: [email or web address]. By joining [email or web address]’s FollowMyHealth portal, you will also be able to view your health information using other applications (apps) compatible with our system.

## 2022-12-02 NOTE — DISCHARGE NOTE PROVIDER - PROVIDER TOKENS
PROVIDER:[TOKEN:[[id number]]] PROVIDER:[TOKEN:[[id number]],FOLLOWUP:[2 weeks]],PROVIDER:[TOKEN:[[id number]],FOLLOWUP:[1 week]],PROVIDER:[TOKEN:[[id number]],FOLLOWUP:[1 week]],PROVIDER:[TOKEN:[[id number]],FOLLOWUP:[2 weeks]],PROVIDER:[TOKEN:[[id number]],FOLLOWUP:[2 weeks]]

## 2022-12-02 NOTE — DISCHARGE NOTE PROVIDER - HOSPITAL COURSE
HPI:  37-year-old female with history of T1DD, depression, sinus tachycardia, ?IBS, pseudoseizures, GERD presents to the ED complaining of cough, chest pain, headache, and weakness for a couple of days and nausea/abdominal pain for 1 week. Pt states she tested herself for COVID 2 days ago and was negative, subsequently found positive in the ED here. Pt states her symptoms are very similar to prior COVID infections (last 1mo ago), has remained with cough since then but has had general worsening of symptoms over the past two days so decided to come in. Pt states abdominal pain has remained as a fullness and tenderness with pressure over the area for the past week, associated with nausea. Pt denies lightheadedness, dizziness, palpitations, vomiting, changes to stools or urination.    In ED, VSS except for tachycardia up to the 120s. EKG showed Sinus tachycardia with ?L fascicular block. Pt had D-Dimer 1049, received CTangio chest PE protocol without PE. CTAP revealed fatty liver with RLQ lymphadenopathy. Pt received NS, zofran, ibuprofen with relief of symptoms. Labs relevant for WBC 21k, Trop <0.01 x1, HG 9.2 (baseline). Transaminitis (153/47/52) and lactate 2.4. UA relevant for high glucose and possible dehydration given specific gravity of 1.036. pt COVID +. Pt admitted for leukocytosis. (01 Dec 2022 00:54)      #Leukocytosis  #NAFLD  -CTAP: no GB wall stranding but fatty infiltrates of liver  -ED US: GB wall 2.2mm, CBD 5.6mm  -pt with mild transaminitis on admission, franks +, tenderness with palpation for the past week  -f/u RUQ US  -withhold ABx for now, monitor fever curve and WBC  -f/u hepatology eval    #Mild COVID-19 infection  -similar symptoms to past infections  -pt recommended not to get COVID vaccine due to flu vaccine reaction  -off O2  -symptomatic treatment  -low dose IVF x12hr  -c/w albuterol PRN    #Chest pain  #Sinus Tachycardia  #?hx Angina  #?Left fascicular block  -Likely secondary to COVID infection and chronic cough x1mo since last COVID infection  -Trop <0.01 x1  -EKG: sinus tachycardia with ?L fascicular block  -Pt states sinus tachycardia of unknown origin, follows Dr. Christian  -c/w metoprolol  -c/w ASA  -trend trop x2more    #Type I diabetes mellitus  -on 44u through insulin pump daily  -will start B/B/SS insulin, f/u daily needs    #Depression  -c/w hydroxyzine, clonazepam, lexapro, amitriptyline as prescribed outpt    #DVT ppx  -hep subq    #GERD  #GI ppx  -protonix therapeutic interchange     #Diet  -CC    #Activity  -IAT    #Code  -full code    #Dispo  -24-48hr, hep eval + WBC monitoring   HPI:  37-year-old female with history of T1DD, depression, sinus tachycardia, ?IBS, pseudoseizures, GERD presents to the ED complaining of cough, chest pain, headache, and weakness for a couple of days and nausea/abdominal pain for 1 week. Pt states she tested herself for COVID 2 days ago and was negative, subsequently found positive in the ED here. Pt states her symptoms are very similar to prior COVID infections (last 1mo ago), has remained with cough since then but has had general worsening of symptoms over the past two days so decided to come in. Pt states abdominal pain has remained as a fullness and tenderness with pressure over the area for the past week, associated with nausea. Pt denies lightheadedness, dizziness, palpitations, vomiting, changes to stools or urination.    In ED, VSS except for tachycardia up to the 120s. EKG showed Sinus tachycardia with ?L fascicular block. Pt had D-Dimer 1049, received CTangio chest PE protocol without PE. CTAP revealed fatty liver with RLQ lymphadenopathy. Pt received NS, zofran, ibuprofen with relief of symptoms. Labs relevant for WBC 21k, Trop <0.01 x1, HG 9.2 (baseline). Transaminitis (153/47/52) and lactate 2.4. UA relevant for high glucose and possible dehydration given specific gravity of 1.036. pt COVID +. Pt admitted for leukocytosis. (01 Dec 2022 00:54)    12/2/22 Attending PN.    Pt seen and examined independently. No new complaints. Feeling better. Tolerating diet. Cough is still there but better. +BMs. Decreased abdominal discomfort after BMs. No N/V. Denies CP, SOB. Remainder ROS unremarkable.    Gen: NAD, AAOx3  CV: S1 S2  Resp: Decreased BS b/l  GI: NT/ND/S +BS  MS: neg c/c/e, +pulses  Neuro: nonfocal, +reflexes    Discharge instructions discussed and patient knows when to seek immediate medical attention.  Patient has proper follow up.  All results discussed and patient aware they require further follow up/work up.  Stressed importance of proper follow up.  Medications prescribed and changes discussed.  All questions and concerns from patient and family addressed. Understanding of instructions verbalized.    Time spent in completing discharge process and coordinating care 45 minutes.    Discussed with housestaff, nursing, case mgmt    #Leukocytosis - likely reactive due to vomiting  #Elevated lactate - resolved  #Chronic abd pain  #NAFLD  #Acute on chronic iron deficiency anemia  -CTAP: no GB wall stranding but fatty infiltrates of liver  -ED US: GB wall 2.2mm, CBD 5.6mm  -pt with mild transaminitis on admission,   -RUQ US: no acute pathology  -IV iron  -needs outpt EGD, c-scopy    #?Old COVID-19 infection / Persistent cough  -c/w albuterol   -added Symbicort  -outpt pulm f/u w/ pulm if persists    #Chronic Sinus Tachycardia  -Trop <0.01 x1  -EKG: sinus tachycardia with ?L fascicular block  -Pt states sinus tachycardia of unknown origin, follows Dr. Christian  -c/w metoprolol  -c/w ASA    #Type I diabetes mellitus Uncontrolled  -Insulin  -goal -180  - pt/mother counselled    #Depression/Anxiety  -c/w hydroxyzine, clonazepam, lexapro, amitriptyline as prescribed outpt    #Morbid Obesity  wt loss advised

## 2022-12-02 NOTE — DISCHARGE NOTE PROVIDER - NSDCMRMEDTOKEN_GEN_ALL_CORE_FT
acetaminophen 500 mg oral tablet: 2 tab(s) orally every 8 hours, As Needed  albuterol 90 mcg/inh inhalation powder: 2 puff(s) inhaled every 6 hours, As Needed  -for bronchospasm   amitriptyline 75 mg oral tablet: 1 tab(s) orally once a day (at bedtime)  aspirin 81 mg oral tablet, chewable: 1 tab(s) orally once a day  budesonide-formoterol 160 mcg-4.5 mcg/inh inhalation aerosol: 2 puff(s) inhaled 2 times a day   clonazePAM 0.5 mg oral tablet: 1 tab(s) orally 2 times a day at 8AM and 12 noon  dicyclomine 20 mg oral tablet: 1 tab(s) orally 3 times a day, As Needed for pain  hydrOXYzine hydrochloride 10 mg oral tablet: 2 tab(s) orally once a day (at bedtime)  insulin pump: 1 unit(s) subcutaneous once a day   Lexapro 20 mg oral tablet: 1 tab(s) orally once a day  magnesium oxide 400 mg oral tablet: 1 tab(s) orally once a day   metoprolol tartrate 25 mg oral tablet: 1 tab(s) orally 2 times a day  polyethylene glycol 3350 oral powder for reconstitution: 17 gram(s) orally every 12 hours  PriLOSEC 20 mg oral delayed release capsule: 1 cap(s) orally once a day  Senna 8.6 mg oral tablet: 1 tab(s) orally 2 times a day, As Needed for constipation  Zofran 4 mg oral tablet: 1 tab(s) orally every 8 hours, As Needed for pain

## 2022-12-02 NOTE — DISCHARGE NOTE PROVIDER - CARE PROVIDERS DIRECT ADDRESSES
,DirectAddress_Unknown ,DirectAddress_Unknown,DirectAddress_Unknown,[email or web address],DirectAddress_Unknown,DirectAddress_Unknown

## 2022-12-02 NOTE — DISCHARGE NOTE PROVIDER - EXTENDED VTE YES NO FOR MLM ENOXAPARIN
Date of Service: 08/09/2021    [facility]  [address] [place] [address]     CHIEF COMPLAINT:  1.  Morbid obesity.  2.  Type 2 diabetes mellitus with multiple complications thereof including diabetic retinopathy, microalbuminuria and polyneuropathy.  3.  Peripheral vascular disease.  4.  Glaucoma with legal blindness of the left eye.  5.  History of CVA.  6.  Dyslipidemia.  7.  Hypertension.    HISTORY OF PRESENT ILLNESS:  The patient is an unfortunate 63-year-old female who is seen today for routine monthly followup visit at [facility] in [place].  She is a long-term care resident.  She lives in the same room as her , Jerry at the facility.  She was admitted in 05/2020.    Both the  and the patient today confess to me that they have not received the COVID vaccine.  When asked why, they cannot seem to give me a good answer.    The patient's blood sugars continue to be in good control since modification of her diabetic program after admission.  She denies any other new symptoms or problems.  Unfortunately, the patient and her  do not leave the room, and in fact, I have never seen them out of their room since acquiring her as a patient now some 15 months ago.    MEDICATIONS:  The patient's medications are once again reviewed and updated.  She does remain on Plavix and a Baby Aspirin.    PHYSICAL EXAMINATION:  GENERAL:  The patient is alert and talkative.  VITAL SIGNS:  Blood pressure of 132/58, pulse 80 and regular, respirations are 16, the temperature is 97.6.  Patient is 5 feet 3 inches tall, weighs 402.3 pounds, giving her a body mass index of 71.26.  Her blood sugar this morning was 144.  NECK:  Supple.  I can detect no carotid bruits.  There is opacification of the lens of the left eye.  CHEST:  Fields are clear.  No rales, rhonchi, rubs or wheezes.  HEART:  Demonstrates a regular rate and rhythm, no murmur, gallop or rub.  ABDOMEN:  Morbidly obese.   I cannot palpate liver or spleen.  EXTREMITIES:  No skin breakdowns but significant edema.    MEDICAL DECISION MAKING:  A 63-year-old female with multiple comorbid medical problems.  She and for that matter, her  refused to make any lifestyle changes to improve their weight or increase their activity level, which places them at significant risk for other medical issues.  They also have not been immunized against COVID by their choice.    I explained the concerns and issues regarding not being immunized against COVID and living in the nursing home and having multiple comorbid medical problems.  They will consider and let me know.    PLAN:  Recheck again in 1 month.  No change in treatment.      Dictated By: Mikey Castaneda MD  Signing Provider: MD ALEA Love/lucila ([id number])  DD: 08/09/2021 13:39:09 TD: 08/10/2021 04:35:30    Copy Sent To:     cc: [facility]    ,

## 2022-12-02 NOTE — DISCHARGE NOTE PROVIDER - CARE PROVIDER_API CALL
Jeremy Munoz)  Internal Medicine  [address] [place] [id number]  Phone: [phone]  Fax: [phone]  Follow Up Time:    Jeremy Munoz)  Internal Medicine  [address] [place] [address]  Phone: [phone]  Fax: [phone]  Follow Up Time: 2 weeks    Martha Robertson)  Internal Medicine  [address] [place] [address]  Phone: [phone]  Fax: [phone]  Follow Up Time: 1 week    Leonardo Mejia)  Gastroenterology; Internal Medicine  [address] [place] [address]  Phone: [phone]  Fax: [phone]  Follow Up Time: 1 week    Elizabeth Galvez)  Medicine  [address] [place] [address]  Phone: [phone]  Fax: [phone]  Follow Up Time: 2 weeks    Raulito Escobar)  Critical Care Medicine; Internal Medicine; Pulmonary Disease; Sleep Medicine  [address] [place] [address]  Phone: [phone]  Fax: [phone]  Follow Up Time: 2 weeks

## 2022-12-02 NOTE — DISCHARGE NOTE PROVIDER - NSDCFUSCHEDAPPT_GEN_ALL_CORE_FT
[facility]  PSYCHIATRY  Vicky  Scheduled Appointment: 12/05/2022    Amadeo Palafox  [facility]  OPHTHALM  [address]  Scheduled Appointment: 12/21/2022    Elizabeth Galvez  [facility]  ENDOCRIN [address]  Scheduled Appointment: 01/19/2023    Martha Robertson  [facility]  INTMED [address]  Scheduled Appointment: 03/01/2023

## 2022-12-02 NOTE — DISCHARGE NOTE PROVIDER - NSDCCPCAREPLAN_GEN_ALL_CORE_FT
PRINCIPAL DISCHARGE DIAGNOSIS  Diagnosis: Abdominal pain  Assessment and Plan of Treatment:       SECONDARY DISCHARGE DIAGNOSES  Diagnosis: Lung nodule  Assessment and Plan of Treatment: CT chest in 6months    Diagnosis: Cough  Assessment and Plan of Treatment:     Diagnosis: Anemia due to chronic blood loss  Assessment and Plan of Treatment:      PRINCIPAL DISCHARGE DIAGNOSIS  Diagnosis: Abdominal pain  Assessment and Plan of Treatment: You came in with abdominal pain and SOB. CT chest was done to rule out clot in your lungs, it came back negative. CT abdomen was also done which didnt show signs of inflammed gall bladder or any emergent problem. Hepatologist was consulted to find out cause of abd pain and mildly elevated liver enzymes, please follow up with them as you will need to get workup outpaitent. Take all meds as prescribed. Come back to hospital if you have severe pain again.      SECONDARY DISCHARGE DIAGNOSES  Diagnosis: Lung nodule  Assessment and Plan of Treatment: CT chest in 6months    Diagnosis: Cough  Assessment and Plan of Treatment:     Diagnosis: Anemia due to chronic blood loss  Assessment and Plan of Treatment:     Diagnosis: Diabetes  Assessment and Plan of Treatment:   Please see your PCP  to have your A1c checked every 3 months. You will need to return at least once each year to have your feet checked. You will need an eye exam once a year to check for retinopathy. You will also need urine tests every year to check for kidney problems. You may need tests to monitor for heart disease such as an EKG, stress test, blood pressure monitoring, and blood tests. Write down your questions so you remember to ask them during your visits.  You will need to check your blood sugar level at least 3 times each day if you are on insulin. If you check your blood sugar level before a meal , it should be between 80 and 130 mg/dL. If you check your blood sugar level 1 to 2 hours after a meal , it should be less than 180 mg/dL.  Your blood sugar level is too low if it goes below 70 mg/dL. If the level is too low, eat or drink 15 grams of fast-acting carbohydrates, such as 1/2 cup fruit juice or 4 oz. regular soda. Check your blood sugar level 15 minutes later. If the level is still low (less than 100 mg/dL), drink another serving.   Do not skip meals. Your blood sugar level may drop too low if you have taken diabetes medicine and do not eat.  Please seek medical attention immediately if:  You have severe abdominal pain, or the pain spreads to your back. You may also be vomiting.  You have trouble staying awake or focusing.  You are shaking or sweating.  You have blurred or double vision.  Your breath has a fruity, sweet smell.  Your breathing is deep and labored, or rapid and shallow.  Your heartbeat is fast and weak.       PRINCIPAL DISCHARGE DIAGNOSIS  Diagnosis: Abdominal pain  Assessment and Plan of Treatment: You came in with abdominal pain and cough. CT chest was done to rule out clot in your lungs, it came back negative. CT abdomen was also done which didnt show signs of inflammed gall bladder or any emergent problem. Please schedule EGD, colonoscopy at earliest convenience. Take all meds as prescribed. Come back to hospital if you have severe pain again.      SECONDARY DISCHARGE DIAGNOSES  Diagnosis: Lung nodule  Assessment and Plan of Treatment: CT chest in 6months    Diagnosis: Cough  Assessment and Plan of Treatment: take Symbicort twice daily    Diagnosis: Anemia due to chronic blood loss  Assessment and Plan of Treatment: Please schedule EGD, colonoscopy at earliest convenience.    Diagnosis: Diabetes  Assessment and Plan of Treatment: please work on weight loss via diet, exercise and better control of your sugars

## 2022-12-05 ENCOUNTER — APPOINTMENT (OUTPATIENT)
Dept: PSYCHIATRY | Facility: CLINIC | Age: 37
End: 2022-12-05

## 2022-12-05 ENCOUNTER — OUTPATIENT (OUTPATIENT)
Dept: OUTPATIENT SERVICES | Facility: HOSPITAL | Age: 37
LOS: 1 days | Discharge: HOME | End: 2022-12-05

## 2022-12-05 DIAGNOSIS — F45.0 SOMATIZATION DISORDER: ICD-10-CM

## 2022-12-05 DIAGNOSIS — F41.1 GENERALIZED ANXIETY DISORDER: ICD-10-CM

## 2022-12-05 RX ORDER — BLOOD-GLUCOSE CONTROL, NORMAL
NORMAL EACH MISCELLANEOUS
Qty: 3 | Refills: 5 | Status: ACTIVE | COMMUNITY
Start: 2022-12-05 | End: 1900-01-01
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
## 2022-12-05 NOTE — REASON FOR VISIT
[Patient] : Patient [FreeTextEntry1] : Patient is a 37 year old female being seen for a followup therapy session COVID screening negative

## 2022-12-08 DIAGNOSIS — Z79.82 LONG TERM (CURRENT) USE OF ASPIRIN: ICD-10-CM

## 2022-12-08 DIAGNOSIS — D72.829 ELEVATED WHITE BLOOD CELL COUNT, UNSPECIFIED: ICD-10-CM

## 2022-12-08 DIAGNOSIS — D50.9 IRON DEFICIENCY ANEMIA, UNSPECIFIED: ICD-10-CM

## 2022-12-08 DIAGNOSIS — K59.00 CONSTIPATION, UNSPECIFIED: ICD-10-CM

## 2022-12-08 DIAGNOSIS — E86.0 DEHYDRATION: ICD-10-CM

## 2022-12-08 DIAGNOSIS — U07.1 COVID-19: ICD-10-CM

## 2022-12-08 DIAGNOSIS — F32.A DEPRESSION, UNSPECIFIED: ICD-10-CM

## 2022-12-08 DIAGNOSIS — R91.1 SOLITARY PULMONARY NODULE: ICD-10-CM

## 2022-12-08 DIAGNOSIS — F41.9 ANXIETY DISORDER, UNSPECIFIED: ICD-10-CM

## 2022-12-08 DIAGNOSIS — Z87.440 PERSONAL HISTORY OF URINARY (TRACT) INFECTIONS: ICD-10-CM

## 2022-12-08 DIAGNOSIS — K76.0 FATTY (CHANGE OF) LIVER, NOT ELSEWHERE CLASSIFIED: ICD-10-CM

## 2022-12-08 DIAGNOSIS — Z88.0 ALLERGY STATUS TO PENICILLIN: ICD-10-CM

## 2022-12-08 DIAGNOSIS — R00.0 TACHYCARDIA, UNSPECIFIED: ICD-10-CM

## 2022-12-08 DIAGNOSIS — K21.9 GASTRO-ESOPHAGEAL REFLUX DISEASE WITHOUT ESOPHAGITIS: ICD-10-CM

## 2022-12-08 DIAGNOSIS — E10.65 TYPE 1 DIABETES MELLITUS WITH HYPERGLYCEMIA: ICD-10-CM

## 2022-12-08 DIAGNOSIS — E66.01 MORBID (SEVERE) OBESITY DUE TO EXCESS CALORIES: ICD-10-CM

## 2022-12-08 DIAGNOSIS — Z86.16 PERSONAL HISTORY OF COVID-19: ICD-10-CM

## 2022-12-08 DIAGNOSIS — Z88.7 ALLERGY STATUS TO SERUM AND VACCINE: ICD-10-CM

## 2022-12-08 DIAGNOSIS — Z96.41 PRESENCE OF INSULIN PUMP (EXTERNAL) (INTERNAL): ICD-10-CM

## 2022-12-08 DIAGNOSIS — Z88.1 ALLERGY STATUS TO OTHER ANTIBIOTIC AGENTS STATUS: ICD-10-CM

## 2022-12-08 DIAGNOSIS — Z28.310 UNVACCINATED FOR COVID-19: ICD-10-CM

## 2022-12-08 DIAGNOSIS — Z88.8 ALLERGY STATUS TO OTHER DRUGS, MEDICAMENTS AND BIOLOGICAL SUBSTANCES STATUS: ICD-10-CM

## 2022-12-08 DIAGNOSIS — D50.0 IRON DEFICIENCY ANEMIA SECONDARY TO BLOOD LOSS (CHRONIC): ICD-10-CM

## 2022-12-15 ENCOUNTER — APPOINTMENT (OUTPATIENT)
Dept: PSYCHIATRY | Facility: CLINIC | Age: 37
End: 2022-12-15

## 2022-12-15 NOTE — ED ADULT NURSE NOTE - CCCP TRG CHIEF CMPLNT
Patients first measurements in the medical weight loss program     Date: 12/15/22    Neck 15 inches  Waist 52 inches  Hip 56 inches     urinary symptoms

## 2022-12-16 ENCOUNTER — APPOINTMENT (OUTPATIENT)
Dept: PSYCHIATRY | Facility: CLINIC | Age: 37
End: 2022-12-16

## 2022-12-16 ENCOUNTER — OUTPATIENT (OUTPATIENT)
Dept: OUTPATIENT SERVICES | Facility: HOSPITAL | Age: 37
LOS: 1 days | Discharge: HOME | End: 2022-12-16

## 2022-12-16 DIAGNOSIS — F41.1 GENERALIZED ANXIETY DISORDER: ICD-10-CM

## 2022-12-16 DIAGNOSIS — F45.0 SOMATIZATION DISORDER: ICD-10-CM

## 2022-12-16 NOTE — DISCUSSION/SUMMARY
[FreeTextEntry1] : \par \par Ms. Ania Mei is a 38yo single  female.   Ania reported severe severe stressors sine beginning of 2020 , including homelessness , changes in living situation, stress over the covid pandemic with losses within the family including his father on 11/28.  Ania reported increased of anxiety with episodes of falling, generalized shakiness, head pressure and inability to move, patients EEG and video EEG were reportedly negative for any seizure activity \par \par No suicidal or homicidal thoughts. No overt psychosis or alvina on exam. Patient has appropriate protective factors in place. Is engaged in his treatment. No acute safety concerns. Outpatient level of care remains appropriate.\par Risks, benefits, and alteratives of treatment options reviewed as well as importance of compliance with chosen options. Patient demonstrated an understanding of above and is amenable with plan.\par \par \par increase  Clonazepam 0.5mg po twice daily to three times daily \par Escitalopram 20mg po daily\par Hydroxyzine 20mg po qhs PRN\par RTC 4 weeks
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
## 2022-12-16 NOTE — SOCIAL HISTORY
[Assisted Living Facility] : lives in an assisted living facility [Disabled] : disabled [Never ] : never  [High School] : high school [None] : none [FreeTextEntry1] : [facility] for the past 2.5yrs [No Known Substance Use] : no known substance use [Yes] : yes [No Known Use] : no known use [TextBox_52] : Prescribed

## 2022-12-16 NOTE — REASON FOR VISIT
[Patient] : Patient [FreeTextEntry1] : Patient is a 37 year old female being seen for a followup therapy session. COVID screening negative.

## 2022-12-16 NOTE — HISTORY OF PRESENT ILLNESS
[FreeTextEntry1] : Ania presents to the clinic accompanied by her mother\par \par She is seen alone in the session. Pt reports increase anxiety and sadness after the death of her father on 11/28. She reports panic attacks occurring a few times weekly, described as chest tightness, SOB, lasting a few minutes. She also notes waking up with heart palpitations, head aches. She reports upset stomach and at times feels short of breath. She notes that she has also been having intermittently low mood. \par \par She engages in recreational activities with peers. She is social with peers, goes on walks with mother or friends. She denies self injurious behavior. She denies SI.  She is in agreement to switch Clonazepam to TID dosing. \par  [FreeTextEntry3] : celexa\par cymbalta (not effective)\par zyprexa\par buspar (not effective)\par gabapentin (hives)\par melatonin  [No] : no [de-identified] : Ms. Ania Mei is a 36 year old single  female of [place] descent, and she resides in [facility] an adult assisted living facility for “a little bit over a year” with her parents after they became homeless 2019. She reports a medical history relevant for diabetes, diabetic neuropathy, back pain, migraine headaches, hand & leg tremors, tachycardia, GERD, & vulvodynia, and she has a psychiatric history relevant for anxiety disorder & bipolar disorder as well as 4 IPP admissions for suicidal ideations & attempts about 20 years ago. She reported that she was last in therapy “4 or 5 years ago” in this clinic program.  \par \par Ms. Mei reported that she initially diagnosed with bipolar disorder "right after 911", that was when she first experienced suicidal ideations, and had her first IPP admission. She reported being in treatment at [facility] until she turned 18 years old and did not engage in treatment again until she was "31 or 32 years old. She reported that she attended this clinic, had 2 sessions and never returned to treatment.\par \par  She reports that she has had a great deal of stressors, she and her parents became homeless "just before the pandemic", they lived between hotels and their car, her father became severe medically ill (cancer on his nose, liver issues, kidney issues, gall bladder issues, nodule on his lung), after her father had surgery the [facility] was able to facilitate a place for the family and they have resided in [facility]  since 10/2020. With all these stressors in her life she noticed her anxiety started to spike, she also witnessed several residents die, her aunt  from COVID.\par \par She reports being anxious constantly where she experiences dizziness, light handedness, chest pains, heart palpitations, and stomach pains “and they have told me I’m having anxiety & panic attacks”. She reported that she saw the psychiatrist at her residence and she prescribed Buspar & melatonin but she “did not feel right on it” and she stopped taking the medications. \par

## 2022-12-16 NOTE — PHYSICAL EXAM
[Cooperative] : cooperative [Euthymic] : euthymic [Full] : full [Clear] : clear [Linear/Goal Directed] : linear/goal directed [WNL] : within normal limits [Average] : average [de-identified] : Fair [de-identified] : Fair

## 2022-12-21 ENCOUNTER — OUTPATIENT (OUTPATIENT)
Dept: OUTPATIENT SERVICES | Facility: HOSPITAL | Age: 37
LOS: 1 days | Discharge: HOME | End: 2022-12-21

## 2022-12-21 ENCOUNTER — APPOINTMENT (OUTPATIENT)
Dept: OPHTHALMOLOGY | Facility: CLINIC | Age: 37
End: 2022-12-21

## 2022-12-21 PROCEDURE — 92134 CPTRZ OPH DX IMG PST SGM RTA: CPT | Mod: 26

## 2022-12-21 PROCEDURE — 92004 COMPRE OPH EXAM NEW PT 1/>: CPT

## 2022-12-21 NOTE — ED ADULT NURSE NOTE - CHPI ED NUR QUALITY2
4 Eyes Skin Assessment     NAME:  Susana Mendoza  YOB: 1962  MEDICAL RECORD NUMBER:  [id number]    The patient is being assessed for  Admission    I agree that One RN have performed a thorough Head to Toe Skin Assessment on the patient. ALL assessment sites listed below have been assessed. Areas assessed by both nurses:    Head, Face, Ears, Shoulders, Back, Chest, Arms, Elbows, Hands, Sacrum. Buttock, Coccyx, Ischium, and Legs. Feet and Heels        Does the Patient have a Wound?  No noted wound(s)       Tomy Prevention initiated by RN: No   Wound Care Orders initiated by RN: NA    Pressure Injury (Stage 3,4, Unstageable, DTI, NWPT, and Complex wounds) if present place referral order by RN under : NA    New and Established Ostomies, if present place, referral order under : NA      Nurse 1 eSignature: Electronically signed by Jake Vyas RN on 12/21/22 at 12:42 AM EST    **SHARE this note so that the co-signing nurse is able to place an eSignature**    Nurse 2 eSignature: Electronically signed by Nya Curry RN on 12/21/22 at 1:07 AM EST burning

## 2022-12-27 NOTE — ED POST DISCHARGE NOTE - NSPOSTDCCALLS_ED_ALL_ED_NU
Office Note, Pulmonary  CHIEF COMPLAINT:  Chief Complaint   Patient presents with   • Follow-up     CT Results        VITALS:  Visit Vitals  /80 (BP Location: LUE - Left upper extremity, Patient Position: Sitting, Cuff Size: Regular)   Pulse 72   Ht 5' 8\" (1.727 m)   Wt 73 kg (161 lb)   SpO2 98%   BMI 24.48 kg/m²       HPI:  He is here for follow up on pulmonary nodule originally found on an abdominal CT scan. He denies any increased respiratory symptoms, hemoptysis. He had a follow up lung ct today to reevaluate and is here to discuss results. Denies significant cough without a recent URI or increased respiratory symptoms. No chest pain, weight loss or hemoptysis. Non smoker. He does use cpap for LOUIE as well. Denies problems using.    Current Outpatient Medications   Medication Sig Dispense Refill   • fluticasone (FLONASE) 50 MCG/ACT nasal spray Spray 1 spray in each nostril daily. 16 g 12   • Coenzyme Q10 (CO Q 10 PO) Take by mouth daily.     • Multiple Vitamins-Minerals (ZINC PO) Take by mouth daily.     • Pyridoxine HCl (B-6 PO) Take by mouth daily.     • Cyanocobalamin (B-12 PO) Take by mouth daily.     • TURMERIC CURCUMIN PO Take by mouth daily.     • VITAMIN E PO Take by mouth daily.     • Cholecalciferol (D3 PO) Take by mouth daily.     • Multiple Vitamins-Minerals (OCUVITE PO) Take by mouth daily.     • aspirin (ECOTRIN) 81 MG EC tablet Take 81 mg by mouth daily.     • Thiamine HCl (VITAMIN B1 PO) Take 1 tablet by mouth daily.     • Calcium Carbonate Antacid (TUMS PO) Take 1 tablet by mouth every 4 hours as needed (acid reflex).     • Multiple Vitamin (MULTI-VITAMIN DAILY PO) Take 1 tablet by mouth daily.     • metoPROLOL tartrate (LOPRESSOR) 25 MG tablet Take 0.5 tablets by mouth 2 times daily.        No current facility-administered medications for this visit.       I have reviewed the patient's medications and allergies, past medical, surgical, social and family history, updating these as 
Physical Therapy  Cancellation/No-show Note  Patient Name:  Justina Kelley  :  1955   Date:  2022  Cancelled visits to date: 02  No-shows to date: 0    For today's appointment patient:  [x]  Cancelled  []  Rescheduled appointment  []  No-show     Reason given by patient:  []  Patient ill  []  Conflicting appointment  []  No transportation    []  Conflict with work  [x]  No reason given  []  Other:     Comments:      Electronically signed by:  Flores Teixeira, PT, DPT
appropriate.  See Histories section of the EMR for a display of this information.    Social History     Tobacco Use   Smoking Status Never Smoker   Smokeless Tobacco Never Used       REVIEW OF SYSTEMS:  Otherwise unchanged      PHYSICAL EXAM:  Lungs: CTA bilaterally  Heart: RRR  Skin: no rashes    CT CHEST WO CONTRAST    Result Date: 5/4/2022  Narrative: INTERPRETATION LOCATION: [facility] PROCEDURE:  CT CHEST WO CONTRAST DATE: 5/4/2022 10:42 AM COMPARISONS: CT chest 8/24/2021, CT abdomen and pelvis 10/21/2021 CLINICAL INDICATION: 82 years Male  Lung nodule, < 6mm, high cancer risk  TECHNIQUE:  Axial images were obtained.  Coronal and sagittal reformations were created. Individualized dose optimization technique was used for the procedure performed. FINDINGS: LUNGS: The left hemidiaphragm is again elevated with linear scarring and/or atelectasis as well as scattered groundglass opacities at the left lung base. 5 mm solid pulmonary nodule at the right lower lobe posteriorly is no longer identified. No new or enlarging pulmonary nodule identified. Small calcified granuloma within left lower lobe. HEART AND GREAT VESSELS: The heart is normal in size.  The is a physiologic amount of pericardial fluid. Advanced coronary artery calcification and possible changes of stenting. The thoracic aorta is normal in caliber and contour with moderate atherosclerotic plaquing. MEDIASTINUM AND NITO: There is no evidence of mediastinal or hilar mass or adenopathy. Scattered prominent though nonenlarged mediastinal lymph nodes, the largest being a precarinal lymph node measuring 9 mm in short axis. PLEURAL SPACES: There is no pleural effusion or pneumothorax. CHEST WALL AND AXILLA: No axillary or supraclavicular lymphadenopathy is demonstrated. Stable subcentimeter hypoattenuating left thyroid nodules. No significant chest wall abnormalities are demonstrated. Degenerative changes of the thoracic spine with multilevel 
1
endplate Schmorl's nodes. UPPER ABDOMEN: Prior cholecystectomy. Stable cystic appearing lesion in the pancreatic tail measuring 2.3 cm.     Impression: IMPRESSION: 1. No acute abnormality within the chest. 2. The previous 5 mm solid pulmonary nodule within the right lower lobe is no longer identified, consistent with a benign etiology. 3. Stable 2.3 cm cystic appearing lesion at the pancreatic tail. Signed by: Ketan Neville    ASSESSMENT:  1. LOUIE (obstructive sleep apnea)    2. Resolved condition, follow-up        PLAN:  Lung nodule resolved - no further CT's needed  He would like to keep LOUIE appt with Dr Fregoso in sept  Return if symptoms worsen or fail to improve.

## 2022-12-29 ENCOUNTER — EMERGENCY (EMERGENCY)
Facility: HOSPITAL | Age: 37
LOS: 0 days | Discharge: HOME | End: 2022-12-29
Admitting: EMERGENCY MEDICINE
Payer: MEDICAID

## 2022-12-29 ENCOUNTER — EMERGENCY (EMERGENCY)
Facility: HOSPITAL | Age: 37
LOS: 0 days | Discharge: HOME | End: 2022-12-29
Attending: EMERGENCY MEDICINE | Admitting: EMERGENCY MEDICINE
Payer: MEDICAID

## 2022-12-29 VITALS
SYSTOLIC BLOOD PRESSURE: 124 MMHG | RESPIRATION RATE: 17 BRPM | HEIGHT: 64 IN | DIASTOLIC BLOOD PRESSURE: 71 MMHG | OXYGEN SATURATION: 99 % | HEART RATE: 97 BPM | TEMPERATURE: 98 F | WEIGHT: 158.07 LBS

## 2022-12-29 VITALS — HEART RATE: 88 BPM

## 2022-12-29 DIAGNOSIS — R05.8 OTHER SPECIFIED COUGH: ICD-10-CM

## 2022-12-29 DIAGNOSIS — F32.A DEPRESSION, UNSPECIFIED: ICD-10-CM

## 2022-12-29 DIAGNOSIS — Z88.7 ALLERGY STATUS TO SERUM AND VACCINE: ICD-10-CM

## 2022-12-29 DIAGNOSIS — Z88.0 ALLERGY STATUS TO PENICILLIN: ICD-10-CM

## 2022-12-29 DIAGNOSIS — Z88.8 ALLERGY STATUS TO OTHER DRUGS, MEDICAMENTS AND BIOLOGICAL SUBSTANCES STATUS: ICD-10-CM

## 2022-12-29 DIAGNOSIS — H43.829 VITREOMACULAR ADHESION, UNSPECIFIED EYE: ICD-10-CM

## 2022-12-29 DIAGNOSIS — E10.9 TYPE 1 DIABETES MELLITUS WITHOUT COMPLICATIONS: ICD-10-CM

## 2022-12-29 DIAGNOSIS — E11.9 TYPE 2 DIABETES MELLITUS WITHOUT COMPLICATIONS: ICD-10-CM

## 2022-12-29 DIAGNOSIS — Z79.82 LONG TERM (CURRENT) USE OF ASPIRIN: ICD-10-CM

## 2022-12-29 DIAGNOSIS — Z02.9 ENCOUNTER FOR ADMINISTRATIVE EXAMINATIONS, UNSPECIFIED: ICD-10-CM
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
## 2022-12-29 DIAGNOSIS — Z88.1 ALLERGY STATUS TO OTHER ANTIBIOTIC AGENTS STATUS: ICD-10-CM

## 2022-12-29 DIAGNOSIS — H04.129 DRY EYE SYNDROME OF UNSPECIFIED LACRIMAL GLAND: ICD-10-CM

## 2022-12-29 DIAGNOSIS — Z79.4 LONG TERM (CURRENT) USE OF INSULIN: ICD-10-CM

## 2022-12-29 DIAGNOSIS — K21.9 GASTRO-ESOPHAGEAL REFLUX DISEASE WITHOUT ESOPHAGITIS: ICD-10-CM

## 2022-12-29 DIAGNOSIS — H93.12 TINNITUS, LEFT EAR: ICD-10-CM

## 2022-12-29 DIAGNOSIS — R30.0 DYSURIA: ICD-10-CM

## 2022-12-29 DIAGNOSIS — T16.2XXA FOREIGN BODY IN LEFT EAR, INITIAL ENCOUNTER: ICD-10-CM

## 2022-12-29 DIAGNOSIS — R35.0 FREQUENCY OF MICTURITION: ICD-10-CM
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
## 2022-12-29 DIAGNOSIS — Z20.822 CONTACT WITH AND (SUSPECTED) EXPOSURE TO COVID-19: ICD-10-CM

## 2022-12-29 DIAGNOSIS — R05.3 CHRONIC COUGH: ICD-10-CM

## 2022-12-29 DIAGNOSIS — Z86.16 PERSONAL HISTORY OF COVID-19: ICD-10-CM
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
## 2022-12-29 LAB
ALBUMIN SERPL ELPH-MCNC: 4 G/DL — SIGNIFICANT CHANGE UP (ref 3.5–5.2)
ALP SERPL-CCNC: 150 U/L — HIGH (ref 30–115)
ALT FLD-CCNC: 63 U/L — HIGH (ref 0–41)
ANION GAP SERPL CALC-SCNC: 10 MMOL/L — SIGNIFICANT CHANGE UP (ref 7–14)
APPEARANCE UR: CLEAR — SIGNIFICANT CHANGE UP
AST SERPL-CCNC: 39 U/L — SIGNIFICANT CHANGE UP (ref 0–41)
BASE EXCESS BLDV CALC-SCNC: 1.4 MMOL/L — SIGNIFICANT CHANGE UP (ref -2–3)
BASOPHILS # BLD AUTO: 0.06 K/UL — SIGNIFICANT CHANGE UP (ref 0–0.2)
BASOPHILS NFR BLD AUTO: 0.6 % — SIGNIFICANT CHANGE UP (ref 0–1)
BILIRUB SERPL-MCNC: <0.2 MG/DL — SIGNIFICANT CHANGE UP (ref 0.2–1.2)
BILIRUB UR-MCNC: NEGATIVE — SIGNIFICANT CHANGE UP
BUN SERPL-MCNC: 9 MG/DL — LOW (ref 10–20)
CA-I SERPL-SCNC: 1.2 MMOL/L — SIGNIFICANT CHANGE UP (ref 1.15–1.33)
CALCIUM SERPL-MCNC: 8.8 MG/DL — SIGNIFICANT CHANGE UP (ref 8.4–10.5)
CHLORIDE SERPL-SCNC: 100 MMOL/L — SIGNIFICANT CHANGE UP (ref 98–110)
CO2 SERPL-SCNC: 23 MMOL/L — SIGNIFICANT CHANGE UP (ref 17–32)
COLOR SPEC: SIGNIFICANT CHANGE UP
CREAT SERPL-MCNC: 0.6 MG/DL — LOW (ref 0.7–1.5)
DIFF PNL FLD: NEGATIVE — SIGNIFICANT CHANGE UP
EGFR: 118 ML/MIN/1.73M2 — SIGNIFICANT CHANGE UP
EOSINOPHIL # BLD AUTO: 0.14 K/UL — SIGNIFICANT CHANGE UP (ref 0–0.7)
EOSINOPHIL NFR BLD AUTO: 1.4 % — SIGNIFICANT CHANGE UP (ref 0–8)
GAS PNL BLDV: 131 MMOL/L — LOW (ref 136–145)
GAS PNL BLDV: SIGNIFICANT CHANGE UP
GLUCOSE SERPL-MCNC: 203 MG/DL — HIGH (ref 70–99)
GLUCOSE UR QL: ABNORMAL
HCO3 BLDV-SCNC: 27 MMOL/L — SIGNIFICANT CHANGE UP (ref 22–29)
HCT VFR BLD CALC: 33.1 % — LOW (ref 37–47)
HCT VFR BLDA CALC: 48 % — SIGNIFICANT CHANGE UP (ref 39–51)
HGB BLD CALC-MCNC: 16 G/DL — SIGNIFICANT CHANGE UP (ref 12.6–17.4)
HGB BLD-MCNC: 9.1 G/DL — LOW (ref 12–16)
IMM GRANULOCYTES NFR BLD AUTO: 0.5 % — HIGH (ref 0.1–0.3)
KETONES UR-MCNC: NEGATIVE — SIGNIFICANT CHANGE UP
LACTATE BLDV-MCNC: 1.3 MMOL/L — SIGNIFICANT CHANGE UP (ref 0.5–2)
LEUKOCYTE ESTERASE UR-ACNC: NEGATIVE — SIGNIFICANT CHANGE UP
LYMPHOCYTES # BLD AUTO: 2.41 K/UL — SIGNIFICANT CHANGE UP (ref 1.2–3.4)
LYMPHOCYTES # BLD AUTO: 24.3 % — SIGNIFICANT CHANGE UP (ref 20.5–51.1)
MAGNESIUM SERPL-MCNC: 1.9 MG/DL — SIGNIFICANT CHANGE UP (ref 1.8–2.4)
MCHC RBC-ENTMCNC: 19.2 PG — LOW (ref 27–31)
MCHC RBC-ENTMCNC: 27.5 G/DL — LOW (ref 32–37)
MCV RBC AUTO: 70 FL — LOW (ref 81–99)
MONOCYTES # BLD AUTO: 0.62 K/UL — HIGH (ref 0.1–0.6)
MONOCYTES NFR BLD AUTO: 6.3 % — SIGNIFICANT CHANGE UP (ref 1.7–9.3)
NEUTROPHILS # BLD AUTO: 6.62 K/UL — HIGH (ref 1.4–6.5)
NEUTROPHILS NFR BLD AUTO: 66.9 % — SIGNIFICANT CHANGE UP (ref 42.2–75.2)
NITRITE UR-MCNC: NEGATIVE — SIGNIFICANT CHANGE UP
NRBC # BLD: 0 /100 WBCS — SIGNIFICANT CHANGE UP (ref 0–0)
PCO2 BLDV: 46 MMHG — HIGH (ref 39–42)
PH BLDV: 7.38 — SIGNIFICANT CHANGE UP (ref 7.32–7.43)
PH UR: 6.5 — SIGNIFICANT CHANGE UP (ref 5–8)
PLATELET # BLD AUTO: 535 K/UL — HIGH (ref 130–400)
PO2 BLDV: 60 MMHG — SIGNIFICANT CHANGE UP
POTASSIUM BLDV-SCNC: 4.2 MMOL/L — SIGNIFICANT CHANGE UP (ref 3.5–5.1)
POTASSIUM SERPL-MCNC: 5.4 MMOL/L — HIGH (ref 3.5–5)
POTASSIUM SERPL-SCNC: 5.4 MMOL/L — HIGH (ref 3.5–5)
PROT SERPL-MCNC: 6.9 G/DL — SIGNIFICANT CHANGE UP (ref 6–8)
PROT UR-MCNC: SIGNIFICANT CHANGE UP
RBC # BLD: 4.73 M/UL — SIGNIFICANT CHANGE UP (ref 4.2–5.4)
RBC # FLD: 22.8 % — HIGH (ref 11.5–14.5)
SAO2 % BLDV: 90.7 % — SIGNIFICANT CHANGE UP
SARS-COV-2 RNA SPEC QL NAA+PROBE: SIGNIFICANT CHANGE UP
SODIUM SERPL-SCNC: 133 MMOL/L — LOW (ref 135–146)
SP GR SPEC: 1.03 — HIGH (ref 1.01–1.03)
UROBILINOGEN FLD QL: SIGNIFICANT CHANGE UP
WBC # BLD: 9.9 K/UL — SIGNIFICANT CHANGE UP (ref 4.8–10.8)
WBC # FLD AUTO: 9.9 K/UL — SIGNIFICANT CHANGE UP (ref 4.8–10.8)

## 2022-12-29 PROCEDURE — 71045 X-RAY EXAM CHEST 1 VIEW: CPT | Mod: 26

## 2022-12-29 PROCEDURE — 99284 EMERGENCY DEPT VISIT MOD MDM: CPT | Mod: 25

## 2022-12-29 PROCEDURE — L9981: CPT

## 2022-12-29 PROCEDURE — 69200 CLEAR OUTER EAR CANAL: CPT

## 2022-12-29 NOTE — ED PROVIDER NOTE - NSFOLLOWUPINSTRUCTIONS_ED_ALL_ED_FT
Follow up with your pulmonary doctor and primary in 2-3 days.  If you do not have a doctor, CALL [phone] to find a physician to follow up with.      Cough, Adult      Coughing is a reflex that clears your throat and your airways (respiratory system). Coughing helps to heal and protect your lungs. It is normal to cough occasionally, but a cough that happens with other symptoms or lasts a long time may be a sign of a condition that needs treatment. An acute cough may only last 2–3 weeks, while a chronic cough may last 8 or more weeks.    Coughing is commonly caused by:  •Infection of the respiratory systemby viruses or bacteria.      •Breathing in substances that irritate your lungs.      •Allergies.      •Asthma.      •Mucus that runs down the back of your throat (postnasal drip).      •Smoking.      •Acid backing up from the stomach into the esophagus (gastroesophageal reflux).      •Certain medicines.      •Chronic lung problems.      •Other medical conditions such as heart failure or a blood clot in the lung (pulmonary embolism).        Follow these instructions at home:    Medicines     •Take over-the-counter and prescription medicines only as told by your health care provider.      •Talk with your health care provider before you take a cough suppressant medicine.        Lifestyle      •Avoid cigarette smoke. Do not use any products that contain nicotine or tobacco, such as cigarettes, e-cigarettes, and chewing tobacco. If you need help quitting, ask your health care provider.      •Drink enough fluid to keep your urine pale yellow.      •Avoid caffeine.      • Do not drink alcohol if your health care provider tells you not to drink.        General instructions      •Pay close attention to changes in your cough. Tell your health care provider about them.      •Always cover your mouth when you cough.      •Avoid things that make you cough, such as perfume, candles, cleaning products, or campfire or tobacco smoke.      •If the air is dry, use a cool mist vaporizer or humidifier in your bedroom or your home to help loosen secretions.      •If your cough is worse at night, try to sleep in a semi-upright position.      •Rest as needed.      •Keep all follow-up visits as told by your health care provider. This is important.        Contact a health care provider if you:    •Have new symptoms.      •Cough up pus.      •Have a cough that does not get better after 2–3 weeks or gets worse.      •Cannot control your cough with cough suppressant medicines and you are losing sleep.      •Have pain that gets worse or pain that is not helped with medicine.      •Have a fever.      •Have unexplained weight loss.      •Have night sweats.        Get help right away if:    •You cough up blood.      •You have difficulty breathing.      •Your heartbeat is very fast.      These symptoms may represent a serious problem that is an emergency. Do not wait to see if the symptoms will go away. Get medical help right away. Call your local emergency services (911 in the [place] Do not drive yourself to the hospital.       Summary    •Coughing is a reflex that clears your throat and your airways. It is normal to cough occasionally, but a cough that happens with other symptoms or lasts a long time may be a sign of a condition that needs treatment.      •Take over-the-counter and prescription medicines only as told by your health care provider.      •Always cover your mouth when you cough.      •Contact a health care provider if you have new symptoms or a cough that does not get better after 2–3 weeks or gets worse.

## 2022-12-29 NOTE — ED PROVIDER NOTE - CLINICAL SUMMARY MEDICAL DECISION MAKING FREE TEXT BOX
37-year-old female with history of T1DD, depression, sinus tachycardia, ?IBS, pseudoseizures, GERD presents to the ED complaining of persistent dry cough for more than 1 month. She was recently admitted in the hospital for similar complaints and discharged on Symbicort and albuterol inhalers and cough syrup. Patient tested positive for COVID 2 months ago and again 1 month ago during hospital admission. She also reports new onset ringing in left ear, increased urinary frequency and dysuria for the past few days. She uses insulin pump for DM but reports chronically uncontrolled blood glucose. Also c/o abdominal pain.    s/p Foreign body removal in ED  pending CXR, COVID PCR and labs.

## 2022-12-29 NOTE — ED PROVIDER NOTE - PATIENT PORTAL LINK FT
You can access the FollowMyHealth Patient Portal offered by [facility] by registering at the following website: [email or web address]. By joining [facility]’s FollowMyHealth portal, you will also be able to view your health information using other applications (apps) compatible with our system.

## 2022-12-29 NOTE — ED PROVIDER NOTE - NS ED ROS FT
Constitutional: No fever, chills.  Cardiac: No chest pain, SOB or edema.  Respiratory: No respiratory distress.   GI: No nausea, vomiting, diarrhea or abdominal pain.  Except as documented in the HPI, all other systems are negative.

## 2022-12-29 NOTE — ED PROVIDER NOTE - ATTENDING CONTRIBUTION TO CARE
38 y/o female h/o DM, GERD, sinus tachycardia, COVID two months ago, admitted 11/30-12/2 with cough / leukocytosis now p/w (1) cough x 1 month, denies modifying factors, also (2) left ear pain and tinnitus x several days, denies modifying factors, + dysuria and elevated blood glucose, states is compliant with home medications, denies fever, hemoptysis, orthopnea, PND, chest / abd pain, n/v/d, LE pain or swelling, recent immobilization or travel or other associated complaints at present. Old chart reviewed. I have reviewed and agree with the initial nursing note, except as documented in my note.    VSS, awake, alert, non-toxic appearing, left ear fb, otherwise ears clear, oropharynx clear, no skin rash or lesions, no tracheal deviation, non-labored breathing without accessory muscle use apparent or pursed lip breathing, no retractions, speaks full sentences, chest CTAB, no w/r/r, +S1/S2, RRR, no m/r/g, abdomen soft, NT, ND, +BS, AO x 3, clear speech and steady gait.

## 2022-12-29 NOTE — ED PROVIDER NOTE - PHYSICAL EXAMINATION
VITALS:  T(F): 98 (12-29-22 @ 11:34), Max: 98 (12-29-22 @ 11:34)  HR: 97 (12-29-22 @ 11:34) (97 - 97)  BP: 124/71 (12-29-22 @ 11:34) (124/71 - 124/71)  RR: 17 (12-29-22 @ 11:34) (17 - 17)  SpO2: 99% (12-29-22 @ 11:34) (99% - 99%)      PHYSICAL EXAM:  GENERAL: NAD, well-developed  HEENT: normal sclera, PERRL, normal external ears, no periauricular tenderness, redness or lymphadenopathy, foreign body in left ear canal noted on otoscopic examination  CHEST/LUNG: CTAB; No wheeze  HEART: RRR, No murmurs, rubs, or gallops  ABDOMEN: Soft, NT/ND; BS present  EXTREMITIES:  No cyanosis, or edema  NEUROLOGY: AAOx3  SKIN: No rashes or lesions

## 2022-12-29 NOTE — ED PROVIDER NOTE - CARE PLAN
Assessment and plan of treatment:	# Cough  - likely chronic post covid cough worsened by underlying anxiety  - c/w inhalers and cough syrup as needed  - CXR ordered  - Routine labs ordered  - VBG ordered    # Left ear canal foreign body  - part Q-tip in the ear canal noted  - foreign body removed with Alligator forceps in ED  - Patient advised to avoid using Q-tips   Principal Discharge DX:	Cough  Assessment and plan of treatment:	# Cough  - likely chronic post covid cough worsened by underlying anxiety  - c/w inhalers and cough syrup as needed  - CXR ordered  - Routine labs ordered  - VBG ordered    # Left ear canal foreign body  - part Q-tip in the ear canal noted  - foreign body removed with Alligator forceps in ED  - Patient advised to avoid using Q-tips  Secondary Diagnosis:	Foreign body in left ear   1

## 2022-12-29 NOTE — ED ADULT TRIAGE NOTE - CHIEF COMPLAINT QUOTE
Pt here for cough x 1month. Denies fevers.. Covid swab negative. From [facility].

## 2022-12-29 NOTE — ED PROVIDER NOTE - IV ALTEPLASE DOOR HIDDEN
Patient would like to speak to someone about her biometric screening forms. There is an issue with it. She would like to speak to ADE OCONNOR.    Please call patient.         show

## 2022-12-29 NOTE — PROCEDURE NOTE - ADDITIONAL PROCEDURE DETAILS
Foreign body removed from left ear canal using Alligator forceps Foreign body removed from left ear canal using Alligator forceps, re-examined and tm intact, no residual fb

## 2022-12-29 NOTE — ED PROVIDER NOTE - OBJECTIVE STATEMENT
37-year-old female with history of T1DD, depression, sinus tachycardia, ?IBS, pseudoseizures, GERD presents to the ED complaining of cough, chest pain, headache, and weakness for a couple of days and nausea/abdominal pain for 1 week. 37-year-old female with history of T1DD, depression, sinus tachycardia, ?IBS, pseudoseizures, GERD presents to the ED complaining of persistent dry cough for more than 1 month. She was recently admitted in the hospital for similar complaints and discharged on Symbicort and albuterol inhalers and cough syrup. Patient tested positive for COVID 2 months ago and again 1 month ago during hospital admission. She also reports new onset ringing in left ear, increased urinary frequency and dysuria for the past few days. She uses insulin pump for DM but reports chronically uncontrolled blood glucose. Also c/o abdominal pain.  Denies any SOB, fever, chills, nausea, vomiting, headache, weakness, numbness.

## 2022-12-29 NOTE — ED PROVIDER NOTE - PLAN OF CARE
# Cough  - likely chronic post covid cough worsened by underlying anxiety  - c/w inhalers and cough syrup as needed  - CXR ordered  - Routine labs ordered  - VBG ordered    # Left ear canal foreign body  - part Q-tip in the ear canal noted  - foreign body removed with Alligator forceps in ED  - Patient advised to avoid using Q-tips

## 2022-12-29 NOTE — ED ADULT NURSE NOTE - OBJECTIVE STATEMENT
Pt came form assisted living, c/o cough for 1 month. Labs drawn, PMH of depression, on meds, no suicidal thoughts voiced.

## 2023-01-04 LAB
ALBUMIN SERPL ELPH-MCNC: 4.4 G/DL
ALP BLD-CCNC: 149 U/L
ALT SERPL-CCNC: 52 U/L
ANION GAP SERPL CALC-SCNC: 11 MMOL/L
AST SERPL-CCNC: 26 U/L
BASOPHILS # BLD AUTO: 0.06 K/UL
BASOPHILS NFR BLD AUTO: 0.7 %
BILIRUB SERPL-MCNC: <0.2 MG/DL
BUN SERPL-MCNC: 11 MG/DL
CALCIUM SERPL-MCNC: 9.9 MG/DL
CHLORIDE SERPL-SCNC: 96 MMOL/L
CO2 SERPL-SCNC: 26 MMOL/L
CREAT SERPL-MCNC: 0.6 MG/DL
EGFR: 118 ML/MIN/1.73M2
EOSINOPHIL # BLD AUTO: 0.1 K/UL
EOSINOPHIL NFR BLD AUTO: 1.2 %
ESTIMATED AVERAGE GLUCOSE: 223 MG/DL
FERRITIN SERPL-MCNC: 13 NG/ML
GLUCOSE SERPL-MCNC: 309 MG/DL
HBA1C MFR BLD HPLC: 9.4 %
HCT VFR BLD CALC: 34.3 %
HGB BLD-MCNC: 9.5 G/DL
IMM GRANULOCYTES NFR BLD AUTO: 0.5 %
IRON SATN MFR SERPL: 7 %
IRON SERPL-MCNC: 23 UG/DL
LYMPHOCYTES # BLD AUTO: 2.29 K/UL
LYMPHOCYTES NFR BLD AUTO: 27.9 %
MAN DIFF?: NORMAL
MCHC RBC-ENTMCNC: 19.5 PG
MCHC RBC-ENTMCNC: 27.7 G/DL
MCV RBC AUTO: 70.6 FL
MONOCYTES # BLD AUTO: 0.52 K/UL
MONOCYTES NFR BLD AUTO: 6.3 %
NEUTROPHILS # BLD AUTO: 5.19 K/UL
NEUTROPHILS NFR BLD AUTO: 63.4 %
PLATELET # BLD AUTO: 589 K/UL
POTASSIUM SERPL-SCNC: 5.1 MMOL/L
PROT SERPL-MCNC: 7.7 G/DL
RBC # BLD: 4.86 M/UL
RBC # BLD: 4.86 M/UL
RBC # FLD: 23.9 %
RETICS # AUTO: 1.9 %
RETICS AGGREG/RBC NFR: 90.9 K/UL
SODIUM SERPL-SCNC: 133 MMOL/L
TIBC SERPL-MCNC: 317 UG/DL
TRANSFERRIN SERPL-MCNC: 273 MG/DL
UIBC SERPL-MCNC: 294 UG/DL
WBC # FLD AUTO: 8.2 K/UL

## 2023-01-10 ENCOUNTER — EMERGENCY (EMERGENCY)
Facility: HOSPITAL | Age: 38
LOS: 0 days | Discharge: HOME | End: 2023-01-10
Attending: STUDENT IN AN ORGANIZED HEALTH CARE EDUCATION/TRAINING PROGRAM | Admitting: STUDENT IN AN ORGANIZED HEALTH CARE EDUCATION/TRAINING PROGRAM
Payer: MEDICAID

## 2023-01-10 VITALS
OXYGEN SATURATION: 97 % | HEART RATE: 103 BPM | DIASTOLIC BLOOD PRESSURE: 59 MMHG | RESPIRATION RATE: 18 BRPM | SYSTOLIC BLOOD PRESSURE: 120 MMHG | TEMPERATURE: 100 F

## 2023-01-10 VITALS
TEMPERATURE: 100 F | WEIGHT: 149.91 LBS | HEART RATE: 108 BPM | HEIGHT: 64 IN | DIASTOLIC BLOOD PRESSURE: 68 MMHG | RESPIRATION RATE: 19 BRPM | SYSTOLIC BLOOD PRESSURE: 142 MMHG | OXYGEN SATURATION: 99 %

## 2023-01-10 DIAGNOSIS — Z79.4 LONG TERM (CURRENT) USE OF INSULIN: ICD-10-CM

## 2023-01-10 DIAGNOSIS — J02.9 ACUTE PHARYNGITIS, UNSPECIFIED: ICD-10-CM

## 2023-01-10 DIAGNOSIS — Z88.1 ALLERGY STATUS TO OTHER ANTIBIOTIC AGENTS STATUS: ICD-10-CM

## 2023-01-10 DIAGNOSIS — Z88.8 ALLERGY STATUS TO OTHER DRUGS, MEDICAMENTS AND BIOLOGICAL SUBSTANCES STATUS: ICD-10-CM

## 2023-01-10 DIAGNOSIS — Z88.7 ALLERGY STATUS TO SERUM AND VACCINE: ICD-10-CM

## 2023-01-10 DIAGNOSIS — R05.3 CHRONIC COUGH: ICD-10-CM

## 2023-01-10 DIAGNOSIS — Z88.0 ALLERGY STATUS TO PENICILLIN: ICD-10-CM

## 2023-01-10 DIAGNOSIS — Z79.82 LONG TERM (CURRENT) USE OF ASPIRIN: ICD-10-CM

## 2023-01-10 DIAGNOSIS — Z87.19 PERSONAL HISTORY OF OTHER DISEASES OF THE DIGESTIVE SYSTEM: ICD-10-CM

## 2023-01-10 DIAGNOSIS — H92.02 OTALGIA, LEFT EAR: ICD-10-CM

## 2023-01-10 DIAGNOSIS — F32.A DEPRESSION, UNSPECIFIED: ICD-10-CM

## 2023-01-10 DIAGNOSIS — K21.9 GASTRO-ESOPHAGEAL REFLUX DISEASE WITHOUT ESOPHAGITIS: ICD-10-CM

## 2023-01-10 DIAGNOSIS — E10.9 TYPE 1 DIABETES MELLITUS WITHOUT COMPLICATIONS: ICD-10-CM

## 2023-01-10 DIAGNOSIS — Z20.822 CONTACT WITH AND (SUSPECTED) EXPOSURE TO COVID-19: ICD-10-CM

## 2023-01-10 DIAGNOSIS — J10.1 INFLUENZA DUE TO OTHER IDENTIFIED INFLUENZA VIRUS WITH OTHER RESPIRATORY MANIFESTATIONS: ICD-10-CM

## 2023-01-10 LAB
FLUAV AG NPH QL: DETECTED
FLUBV AG NPH QL: SIGNIFICANT CHANGE UP
RSV RNA NPH QL NAA+NON-PROBE: SIGNIFICANT CHANGE UP
SARS-COV-2 RNA SPEC QL NAA+PROBE: SIGNIFICANT CHANGE UP

## 2023-01-10 PROCEDURE — 99284 EMERGENCY DEPT VISIT MOD MDM: CPT

## 2023-01-10 PROCEDURE — 71046 X-RAY EXAM CHEST 2 VIEWS: CPT | Mod: 26

## 2023-01-10 RX ORDER — IBUPROFEN 200 MG
600 TABLET ORAL ONCE
Refills: 0 | Status: COMPLETED | OUTPATIENT
Start: 2023-01-10 | End: 2023-01-10

## 2023-01-10 RX ORDER — AZITHROMYCIN 500 MG/1
1 TABLET, FILM COATED ORAL
Qty: 1 | Refills: 0
Start: 2023-01-10 | End: 2023-01-14

## 2023-01-10 RX ORDER — MECLIZINE HCL 12.5 MG
50 TABLET ORAL ONCE
Refills: 0 | Status: COMPLETED | OUTPATIENT
Start: 2023-01-10 | End: 2023-01-10

## 2023-01-10 RX ORDER — ACETAMINOPHEN 500 MG
1 TABLET ORAL
Qty: 24 | Refills: 0
Start: 2023-01-10 | End: 2023-01-13

## 2023-01-10 RX ORDER — ACETAMINOPHEN 500 MG
650 TABLET ORAL ONCE
Refills: 0 | Status: COMPLETED | OUTPATIENT
Start: 2023-01-10 | End: 2023-01-10

## 2023-01-10 RX ORDER — ONDANSETRON 8 MG/1
4 TABLET, FILM COATED ORAL ONCE
Refills: 0 | Status: COMPLETED | OUTPATIENT
Start: 2023-01-10 | End: 2023-01-10

## 2023-01-10 RX ADMIN — Medication 50 MILLIGRAM(S): at 11:52

## 2023-01-10 RX ADMIN — Medication 600 MILLIGRAM(S): at 10:35

## 2023-01-10 RX ADMIN — ONDANSETRON 4 MILLIGRAM(S): 8 TABLET, FILM COATED ORAL at 11:53

## 2023-01-10 RX ADMIN — Medication 650 MILLIGRAM(S): at 11:37

## 2023-01-10 NOTE — ED PROVIDER NOTE - ATTENDING CONTRIBUTION TO CARE
I personally evaluated the patient. I reviewed the Resident’s or Physician Assistant’s note (as assigned above), and agree with the findings and plan except as documented in my note.  37-year-old female with a past medical history of diabetes depression tachycardia history follows up with cardiologist Dr. Christian PMD Dr. Kwok's man resenting here with several complaints.  Patient states she has been having chronic cough for several months was told it is due to long COVID has an appointment with Dr. Mahendra Pinto pulmonologist this month began having some sore throat continued left ear pain has a history of frequent ear infections she had a Q-tip in her left ears 10 days ago which was removed but symptoms been having pain no worsening shortness of breath no vomiting   CONSTITUTIONAL: WA / WN / NAD  HEAD: NCAT  EYES: PERRL; EOMI; anicteric.  ENT: Normal pharynx; mucous membranes pink/moist, no erythema. right tm wnl left tm mild opacification  NECK: Supple;   CARD: RRR; nl S1/S2; no M/R/G.   RESP: Respiratory rate and effort are normal; breath sounds clear and equal bilaterally.  ABD: Soft, NT ND  MSK/EXT: No gross deformities; full range of motion.  SKIN: Warm and dry;   NEURO: AAOx3, Motor 5/5 x 4 extremities,  PSYCH: Memory Intact, Normal Affect

## 2023-01-10 NOTE — ED PROVIDER NOTE - PATIENT PORTAL LINK FT
You can access the FollowMyHealth Patient Portal offered by [facility] by registering at the following website: [email or web address]. By joining [employer]’s FollowMyHealth portal, you will also be able to view your health information using other applications (apps) compatible with our system.

## 2023-01-10 NOTE — ED PROVIDER NOTE - PROGRESS NOTE DETAILS
[place]: pt noted to be febrile while in ED, tylenol and motrin given.  Was also having dizziness and nausea, likely secondary to perforated L TM, meclizine given. [place]: pt tolerating PO, temp/HR improved, will DC

## 2023-01-10 NOTE — ED PROVIDER NOTE - CLINICAL SUMMARY MEDICAL DECISION MAKING FREE TEXT BOX
37-year-old female with a past medical history of diabetes depression tachycardia history follows up with cardiologist Dr. Christian PMD Dr. Kwok's man resenting here with several complaints.  Patient states she has been having chronic cough for several months was told it is due to long COVID has an appointment with Dr. Mahendra Pinto pulmonologist this month began having some sore throat continued left ear pain has a history of frequent ear infections she had a Q-tip in her left ears 10 days ago which was removed but symptoms been having pain no worsening shortness of breath no vomiting  vs reviewed meds given for fever with improvement in fever, patient has an appointment with her cardiologist next month for stress echo for tachycardia. found to be Flu + , given ear patient antibiotics sent. Return precautions provided.

## 2023-01-10 NOTE — ED PROVIDER NOTE - NS ED ROS FT
Constitutional: Reports elevated temp  HEENT: Reports sore throat.   Cardiac:  No chest pain, leg edema, or leg pain.  Respiratory:  Reports cough. no hemoptysis.  GI:  No nausea, vomiting, diarrhea, or abdominal pain.  :  No dysuria, frequency, or urgency.  MS:  No myalgia, muscle weakness   Neuro: No LOC   Skin:  No skin rash.   Endocrine: No polyuria, polyphagia, or polydipsia.

## 2023-01-10 NOTE — ED PROVIDER NOTE - NSFOLLOWUPINSTRUCTIONS_ED_ALL_ED_FT
Viral Illness, Adult  Viruses are tiny germs that can get into a person's body and cause illness. There are many different types of viruses, and they cause many types of illness. Viral illnesses can range from mild to severe. They can affect various parts of the body.    Common illnesses that are caused by a virus include colds and the flu. Viral illnesses also include serious conditions such as HIV/AIDS (human immunodeficiency virus/acquired immunodeficiency syndrome). A few viruses have been linked to certain cancers.    What are the causes?  Many types of viruses can cause illness. Viruses invade cells in your body, multiply, and cause the infected cells to malfunction or die. When the cell dies, it releases more of the virus. When this happens, you develop symptoms of the illness, and the virus continues to spread to other cells. If the virus takes over the function of the cell, it can cause the cell to divide and grow out of control, as is the case when a virus causes cancer.    Different viruses get into the body in different ways. You can get a virus by:    Swallowing food or water that is contaminated with the virus.  Breathing in droplets that have been coughed or sneezed into the air by an infected person.  Touching a surface that has been contaminated with the virus and then touching your eyes, nose, or mouth.  Being bitten by an insect or animal that carries the virus.  Having sexual contact with a person who is infected with the virus.  Being exposed to blood or fluids that contain the virus, either through an open cut or during a transfusion.    If a virus enters your body, your body's defense system (immune system) will try to fight the virus. You may be at higher risk for a viral illness if your immune system is weak.    What are the signs or symptoms?  Symptoms vary depending on the type of virus and the location of the cells that it invades. Common symptoms of the main types of viral illnesses include:    Cold and flu viruses     Fever.  Headache.  Sore throat.  Muscle aches.  Nasal congestion.  Cough.  Digestive system (gastrointestinal) viruses     Fever.  Abdominal pain.  Nausea.  Diarrhea.  Liver viruses (hepatitis)     Loss of appetite.  Tiredness.  Yellowing of the skin (jaundice).  Brain and spinal cord viruses     Fever.  Headache.  Stiff neck.  Nausea and vomiting.  Confusion or sleepiness.  Skin viruses     Warts.  Itching.  Rash.  Sexually transmitted viruses     Discharge.  Swelling.  Redness.  Rash.  How is this treated?  Viruses can be difficult to treat because they live within cells. Antibiotic medicines do not treat viruses because these drugs do not get inside cells. Treatment for a viral illness may include:    Resting and drinking plenty of fluids.  Medicines to relieve symptoms. These can include over-the-counter medicine for pain and fever, medicines for cough or congestion, and medicines to relieve diarrhea.  Antiviral medicines. These drugs are available only for certain types of viruses. They may help reduce flu symptoms if taken early. There are also many antiviral medicines for hepatitis and HIV/AIDS.    Some viral illnesses can be prevented with vaccinations. A common example is the flu shot.    Follow these instructions at home:  Medicines     Image   Take over-the-counter and prescription medicines only as told by your health care provider.  If you were prescribed an antiviral medicine, take it as told by your health care provider. Do not stop taking the medicine even if you start to feel better.  Be aware of when antibiotics are needed and when they are not needed. Antibiotics do not treat viruses. If your health care provider thinks that you may have a bacterial infection as well as a viral infection, you may get an antibiotic.    Do not ask for an antibiotic prescription if you have been diagnosed with a viral illness. That will not make your illness go away faster.  Frequently taking antibiotics when they are not needed can lead to antibiotic resistance. When this develops, the medicine no longer works against the bacteria that it normally fights.    General instructions     Drink enough fluids to keep your urine clear or pale yellow.  Rest as much as possible.  Return to your normal activities as told by your health care provider. Ask your health care provider what activities are safe for you.  Keep all follow-up visits as told by your health care provider. This is important.  How is this prevented?  ImageTake these actions to reduce your risk of viral infection:    Eat a healthy diet and get enough rest.  Wash your hands often with soap and water. This is especially important when you are in public places. If soap and water are not available, use hand .  Avoid close contact with friends and family who have a viral illness.  If you travel to areas where viral gastrointestinal infection is common, avoid drinking water or eating raw food.  Keep your immunizations up to date. Get a flu shot every year as told by your health care provider.  Do not share toothbrushes, nail clippers, razors, or needles with other people.  Always practice safe sex.    Contact a health care provider if:  You have symptoms of a viral illness that do not go away.  Your symptoms come back after going away.  Your symptoms get worse.  Get help right away if:  You have trouble breathing.  You have a severe headache or a stiff neck.  You have severe vomiting or abdominal pain.  This information is not intended to replace advice given to you by your health care provider. Make sure you discuss any questions you have with your health care provider.    Earache    WHAT YOU NEED TO KNOW:    An earache can be caused by a problem within your ear or from another body area. Common causes include earwax buildup, objects in your ear, injury, infections, or jaw or dental problems. Less often, earaches may be caused by arthritis in your upper spine.    DISCHARGE INSTRUCTIONS:    Return to the emergency department if:     You have a severe earache.    You have ear pain with itching, hearing loss, dizziness, a feeling of fullness in your ear, or ringing in your ears.    Contact your healthcare provider if:     Your ear pain worsens or does not go away with treatment.  You have drainage from your ear.  You have a fever.   Your outer ear becomes red, swollen, and warm.    You have questions or concerns about your condition or care.    Medicines: You may need any of the following:     NSAIDs, such as ibuprofen, help decrease swelling, pain, and fever. This medicine is available with or without a doctor's order. NSAIDs can cause stomach bleeding or kidney problems in certain people. If you take blood thinner medicine, always ask if NSAIDs are safe for you. Always read the medicine label and follow directions. Do not give these medicines to children under 6 months of age without direction from your child's healthcare provider.    Acetaminophen decreases pain and fever. It is available without a doctor's order. Ask how much to take and how often to take it. Follow directions. Acetaminophen can cause liver damage if not taken correctly.    Do not give aspirin to children under 18 years of age. Your child could develop Reye syndrome if he takes aspirin. Reye syndrome can cause life-threatening brain and liver damage. Check your child's medicine labels for aspirin, salicylates, or oil of wintergreen.     Take your medicine as directed. Call your healthcare provider if you think your medicine is not helping or if you have side effects. Tell him if you are allergic to any medicine. Keep a list of the medicines, vitamins, and herbs you take. Include the amounts, and when and why you take them. Bring the list or the pill bottles to follow-up visits. Carry your medicine list with you in case of an emergency.    Follow up with your healthcare provider as directed: Write down your questions so you remember to ask them during your visits.

## 2023-01-10 NOTE — ED PROVIDER NOTE - OBJECTIVE STATEMENT
38 yo female w/ PMH of T1DD, depression, sinus tachycardia, IBS, pseudoseizures, GERD presents for sore throat x 3 days and L ear pain x 10 days.  Was in ED 10 days ago, had Q-tip in L ear which was removed, has been having pain since then.  Has been having chronic cough and SOB for past 4 months, no worsening of cough/SOB. Associated elevated temp, Tm 99.8 at home.

## 2023-01-10 NOTE — ED PROVIDER NOTE - PHYSICAL EXAMINATION
GENERAL: NAD   SKIN: warm, dry  HEAD: Normocephalic; atraumatic.  EYES: PERRLA, EOMI   ENT: Oropharynx erythematous. No tonsillar exudates or palatal petechiae noted. L TM pinpoint perforation noted with mild erythema. No mastoid TTP.   CARD: S1, S2 normal; no murmurs, gallops, or rubs. Regular rate and rhythm.   RESP: LCTAB; No wheezes, rales, rhonchi, or stridor.  ABD: soft, nontender, and nondistended  NEURO: Alert, oriented, grossly unremarkable  PSYCH: Cooperative, appropriate.

## 2023-01-10 NOTE — ED ADULT NURSE NOTE - NSIMPLEMENTINTERV_GEN_ALL_ED
Implemented All Universal Safety Interventions:  [place] to call system. Call bell, personal items and telephone within reach. Instruct patient to call for assistance. Room bathroom lighting operational. Non-slip footwear when patient is off stretcher. Physically safe environment: no spills, clutter or unnecessary equipment. Stretcher in lowest position, wheels locked, appropriate side rails in place.

## 2023-01-10 NOTE — ED PROVIDER NOTE - CARE PROVIDER_API CALL
Martha Robertson)  Internal Medicine  [address] [place] [address]  Phone: [phone]  Fax: [phone]  Established Patient  Follow Up Time: 1-3 Days

## 2023-01-12 ENCOUNTER — NON-APPOINTMENT (OUTPATIENT)
Age: 38
End: 2023-01-12

## 2023-01-12 ENCOUNTER — EMERGENCY (EMERGENCY)
Facility: HOSPITAL | Age: 38
LOS: 0 days | Discharge: HOME | End: 2023-01-12
Attending: EMERGENCY MEDICINE | Admitting: EMERGENCY MEDICINE
Payer: MEDICAID

## 2023-01-12 VITALS
HEART RATE: 114 BPM | DIASTOLIC BLOOD PRESSURE: 73 MMHG | OXYGEN SATURATION: 100 % | TEMPERATURE: 103 F | HEIGHT: 64 IN | RESPIRATION RATE: 20 BRPM | SYSTOLIC BLOOD PRESSURE: 136 MMHG

## 2023-01-12 VITALS
HEART RATE: 102 BPM | RESPIRATION RATE: 18 BRPM | OXYGEN SATURATION: 96 % | DIASTOLIC BLOOD PRESSURE: 58 MMHG | TEMPERATURE: 98 F | SYSTOLIC BLOOD PRESSURE: 111 MMHG

## 2023-01-12 DIAGNOSIS — F41.9 ANXIETY DISORDER, UNSPECIFIED: ICD-10-CM

## 2023-01-12 DIAGNOSIS — J02.9 ACUTE PHARYNGITIS, UNSPECIFIED: ICD-10-CM

## 2023-01-12 DIAGNOSIS — M54.9 DORSALGIA, UNSPECIFIED: ICD-10-CM

## 2023-01-12 DIAGNOSIS — R94.31 ABNORMAL ELECTROCARDIOGRAM [ECG] [EKG]: ICD-10-CM

## 2023-01-12 DIAGNOSIS — Z88.0 ALLERGY STATUS TO PENICILLIN: ICD-10-CM

## 2023-01-12 DIAGNOSIS — Z79.82 LONG TERM (CURRENT) USE OF ASPIRIN: ICD-10-CM

## 2023-01-12 DIAGNOSIS — E10.40 TYPE 1 DIABETES MELLITUS WITH DIABETIC NEUROPATHY, UNSPECIFIED: ICD-10-CM

## 2023-01-12 DIAGNOSIS — Z88.1 ALLERGY STATUS TO OTHER ANTIBIOTIC AGENTS STATUS: ICD-10-CM

## 2023-01-12 DIAGNOSIS — F32.A DEPRESSION, UNSPECIFIED: ICD-10-CM

## 2023-01-12 DIAGNOSIS — Z88.8 ALLERGY STATUS TO OTHER DRUGS, MEDICAMENTS AND BIOLOGICAL SUBSTANCES STATUS: ICD-10-CM

## 2023-01-12 DIAGNOSIS — R00.0 TACHYCARDIA, UNSPECIFIED: ICD-10-CM

## 2023-01-12 DIAGNOSIS — G89.29 OTHER CHRONIC PAIN: ICD-10-CM

## 2023-01-12 DIAGNOSIS — G43.909 MIGRAINE, UNSPECIFIED, NOT INTRACTABLE, WITHOUT STATUS MIGRAINOSUS: ICD-10-CM

## 2023-01-12 DIAGNOSIS — R05.9 COUGH, UNSPECIFIED: ICD-10-CM

## 2023-01-12 DIAGNOSIS — Z87.19 PERSONAL HISTORY OF OTHER DISEASES OF THE DIGESTIVE SYSTEM: ICD-10-CM

## 2023-01-12 DIAGNOSIS — Z20.822 CONTACT WITH AND (SUSPECTED) EXPOSURE TO COVID-19: ICD-10-CM

## 2023-01-12 DIAGNOSIS — K21.9 GASTRO-ESOPHAGEAL REFLUX DISEASE WITHOUT ESOPHAGITIS: ICD-10-CM

## 2023-01-12 DIAGNOSIS — J10.1 INFLUENZA DUE TO OTHER IDENTIFIED INFLUENZA VIRUS WITH OTHER RESPIRATORY MANIFESTATIONS: ICD-10-CM

## 2023-01-12 DIAGNOSIS — Z79.4 LONG TERM (CURRENT) USE OF INSULIN: ICD-10-CM

## 2023-01-12 DIAGNOSIS — Z88.7 ALLERGY STATUS TO SERUM AND VACCINE: ICD-10-CM

## 2023-01-12 DIAGNOSIS — R50.9 FEVER, UNSPECIFIED: ICD-10-CM

## 2023-01-12 DIAGNOSIS — Z96.41 PRESENCE OF INSULIN PUMP (EXTERNAL) (INTERNAL): ICD-10-CM

## 2023-01-12 LAB
ALBUMIN SERPL ELPH-MCNC: 3.7 G/DL — SIGNIFICANT CHANGE UP (ref 3.5–5.2)
ALP SERPL-CCNC: 110 U/L — SIGNIFICANT CHANGE UP (ref 30–115)
ALT FLD-CCNC: 42 U/L — HIGH (ref 0–41)
ANION GAP SERPL CALC-SCNC: 12 MMOL/L — SIGNIFICANT CHANGE UP (ref 7–14)
ANISOCYTOSIS BLD QL: SIGNIFICANT CHANGE UP
APPEARANCE UR: CLEAR — SIGNIFICANT CHANGE UP
AST SERPL-CCNC: 44 U/L — HIGH (ref 0–41)
BASOPHILS # BLD AUTO: 0.11 K/UL — SIGNIFICANT CHANGE UP (ref 0–0.2)
BASOPHILS NFR BLD AUTO: 0.9 % — SIGNIFICANT CHANGE UP (ref 0–1)
BILIRUB SERPL-MCNC: <0.2 MG/DL — SIGNIFICANT CHANGE UP (ref 0.2–1.2)
BILIRUB UR-MCNC: NEGATIVE — SIGNIFICANT CHANGE UP
BUN SERPL-MCNC: 12 MG/DL — SIGNIFICANT CHANGE UP (ref 10–20)
CALCIUM SERPL-MCNC: 8.5 MG/DL — SIGNIFICANT CHANGE UP (ref 8.4–10.5)
CHLORIDE SERPL-SCNC: 98 MMOL/L — SIGNIFICANT CHANGE UP (ref 98–110)
CO2 SERPL-SCNC: 20 MMOL/L — SIGNIFICANT CHANGE UP (ref 17–32)
COLOR SPEC: SIGNIFICANT CHANGE UP
CREAT SERPL-MCNC: 0.7 MG/DL — SIGNIFICANT CHANGE UP (ref 0.7–1.5)
DIFF PNL FLD: NEGATIVE — SIGNIFICANT CHANGE UP
EGFR: 114 ML/MIN/1.73M2 — SIGNIFICANT CHANGE UP
EOSINOPHIL # BLD AUTO: 0 K/UL — SIGNIFICANT CHANGE UP (ref 0–0.7)
EOSINOPHIL NFR BLD AUTO: 0 % — SIGNIFICANT CHANGE UP (ref 0–8)
FLUAV AG NPH QL: DETECTED
FLUBV AG NPH QL: SIGNIFICANT CHANGE UP
GIANT PLATELETS BLD QL SMEAR: PRESENT — SIGNIFICANT CHANGE UP
GLUCOSE SERPL-MCNC: 301 MG/DL — HIGH (ref 70–99)
GLUCOSE UR QL: ABNORMAL
HCG SERPL QL: NEGATIVE — SIGNIFICANT CHANGE UP
HCT VFR BLD CALC: 32.9 % — LOW (ref 37–47)
HGB BLD-MCNC: 9.2 G/DL — LOW (ref 12–16)
HYPOCHROMIA BLD QL: SIGNIFICANT CHANGE UP
KETONES UR-MCNC: ABNORMAL
LEUKOCYTE ESTERASE UR-ACNC: NEGATIVE — SIGNIFICANT CHANGE UP
LIDOCAIN IGE QN: 15 U/L — SIGNIFICANT CHANGE UP (ref 7–60)
LYMPHOCYTES # BLD AUTO: 0.53 K/UL — LOW (ref 1.2–3.4)
LYMPHOCYTES # BLD AUTO: 4.3 % — LOW (ref 20.5–51.1)
MANUAL SMEAR VERIFICATION: SIGNIFICANT CHANGE UP
MCHC RBC-ENTMCNC: 19.7 PG — LOW (ref 27–31)
MCHC RBC-ENTMCNC: 28 G/DL — LOW (ref 32–37)
MCV RBC AUTO: 70.4 FL — LOW (ref 81–99)
MICROCYTES BLD QL: SIGNIFICANT CHANGE UP
MONOCYTES # BLD AUTO: 0.43 K/UL — SIGNIFICANT CHANGE UP (ref 0.1–0.6)
MONOCYTES NFR BLD AUTO: 3.5 % — SIGNIFICANT CHANGE UP (ref 1.7–9.3)
NEUTROPHILS # BLD AUTO: 10.89 K/UL — HIGH (ref 1.4–6.5)
NEUTROPHILS NFR BLD AUTO: 86.1 % — HIGH (ref 42.2–75.2)
NEUTS BAND # BLD: 2.6 % — SIGNIFICANT CHANGE UP (ref 0–6)
NITRITE UR-MCNC: NEGATIVE — SIGNIFICANT CHANGE UP
OVALOCYTES BLD QL SMEAR: SLIGHT — SIGNIFICANT CHANGE UP
PH UR: 6 — SIGNIFICANT CHANGE UP (ref 5–8)
PLAT MORPH BLD: ABNORMAL
PLATELET # BLD AUTO: 452 K/UL — HIGH (ref 130–400)
POIKILOCYTOSIS BLD QL AUTO: SLIGHT — SIGNIFICANT CHANGE UP
POLYCHROMASIA BLD QL SMEAR: SLIGHT — SIGNIFICANT CHANGE UP
POTASSIUM SERPL-MCNC: 4.9 MMOL/L — SIGNIFICANT CHANGE UP (ref 3.5–5)
POTASSIUM SERPL-SCNC: 4.9 MMOL/L — SIGNIFICANT CHANGE UP (ref 3.5–5)
PROT SERPL-MCNC: 7 G/DL — SIGNIFICANT CHANGE UP (ref 6–8)
PROT UR-MCNC: SIGNIFICANT CHANGE UP
RBC # BLD: 4.67 M/UL — SIGNIFICANT CHANGE UP (ref 4.2–5.4)
RBC # FLD: 22.2 % — HIGH (ref 11.5–14.5)
RBC BLD AUTO: ABNORMAL
RSV RNA NPH QL NAA+NON-PROBE: SIGNIFICANT CHANGE UP
SARS-COV-2 RNA SPEC QL NAA+PROBE: SIGNIFICANT CHANGE UP
SODIUM SERPL-SCNC: 130 MMOL/L — LOW (ref 135–146)
SP GR SPEC: 1.03 — HIGH (ref 1.01–1.03)
STOMATOCYTES BLD QL SMEAR: SLIGHT — SIGNIFICANT CHANGE UP
UROBILINOGEN FLD QL: SIGNIFICANT CHANGE UP
VARIANT LYMPHS # BLD: 2.6 % — SIGNIFICANT CHANGE UP (ref 0–5)
WBC # BLD: 12.28 K/UL — HIGH (ref 4.8–10.8)
WBC # FLD AUTO: 12.28 K/UL — HIGH (ref 4.8–10.8)

## 2023-01-12 PROCEDURE — 99284 EMERGENCY DEPT VISIT MOD MDM: CPT

## 2023-01-12 PROCEDURE — 71045 X-RAY EXAM CHEST 1 VIEW: CPT | Mod: 26

## 2023-01-12 PROCEDURE — 93010 ELECTROCARDIOGRAM REPORT: CPT

## 2023-01-12 RX ORDER — SODIUM CHLORIDE 9 MG/ML
1000 INJECTION, SOLUTION INTRAVENOUS ONCE
Refills: 0 | Status: COMPLETED | OUTPATIENT
Start: 2023-01-12 | End: 2023-01-12

## 2023-01-12 RX ORDER — KETOROLAC TROMETHAMINE 30 MG/ML
15 SYRINGE (ML) INJECTION ONCE
Refills: 0 | Status: DISCONTINUED | OUTPATIENT
Start: 2023-01-12 | End: 2023-01-12

## 2023-01-12 RX ORDER — METOCLOPRAMIDE HCL 10 MG
10 TABLET ORAL ONCE
Refills: 0 | Status: COMPLETED | OUTPATIENT
Start: 2023-01-12 | End: 2023-01-12

## 2023-01-12 RX ORDER — FAMOTIDINE 10 MG/ML
20 INJECTION INTRAVENOUS ONCE
Refills: 0 | Status: COMPLETED | OUTPATIENT
Start: 2023-01-12 | End: 2023-01-12

## 2023-01-12 RX ORDER — ACETAMINOPHEN 500 MG
650 TABLET ORAL ONCE
Refills: 0 | Status: COMPLETED | OUTPATIENT
Start: 2023-01-12 | End: 2023-01-12

## 2023-01-12 RX ADMIN — Medication 15 MILLIGRAM(S): at 12:29

## 2023-01-12 RX ADMIN — FAMOTIDINE 20 MILLIGRAM(S): 10 INJECTION INTRAVENOUS at 12:11

## 2023-01-12 RX ADMIN — SODIUM CHLORIDE 1000 MILLILITER(S): 9 INJECTION, SOLUTION INTRAVENOUS at 14:46

## 2023-01-12 RX ADMIN — Medication 15 MILLIGRAM(S): at 15:30

## 2023-01-12 RX ADMIN — Medication 15 MILLIGRAM(S): at 14:44

## 2023-01-12 RX ADMIN — Medication 15 MILLIGRAM(S): at 12:11

## 2023-01-12 RX ADMIN — Medication 650 MILLIGRAM(S): at 12:11

## 2023-01-12 RX ADMIN — SODIUM CHLORIDE 1000 MILLILITER(S): 9 INJECTION, SOLUTION INTRAVENOUS at 12:13

## 2023-01-12 RX ADMIN — Medication 650 MILLIGRAM(S): at 12:29

## 2023-01-12 RX ADMIN — Medication 104 MILLIGRAM(S): at 14:45

## 2023-01-12 NOTE — ED PROVIDER NOTE - OBJECTIVE STATEMENT
38 yo F with PMHx of DM I, diabetic neuropathy, HTN, tachycardia, neuropathy, chronic back pain, migraine headaches, IBS, anxiety, depression, GERD, R hand tremor, and pseudoseizures presents to the ED c/o 38 yo F with PMHx of DM I, diabetic neuropathy, HTN, tachycardia, neuropathy, chronic back pain, migraine headaches, IBS, anxiety, depression, GERD, R hand tremor, and pseudoseizures presents to the ED c/o fever, chills, body aches, non-productive cough, sore throat, and left ear pain x 1 week. Pt was seen in ED 2 days ago, tested positive for FLU and was sent home on azithromycin for ear infection. She took 325mg of Tylenol around 830AM without any improvement in symptoms. She denies other complaints.

## 2023-01-12 NOTE — ED PROVIDER NOTE - CLINICAL SUMMARY MEDICAL DECISION MAKING FREE TEXT BOX
VSS, non-toxic appearing, exam non-focal, ED testing non-diagnostic, clinical picture c/w VIRAL etiology, abx NOT recommended at this time, unlikely underlying cardiac or vascular complications, results d/w pt, states feels well enough to go home, no ALLAN, ambulating in ED w/o difficulty, advised they do not require hospitalization at this time although if symptoms change or worsen, may need to be hospitalized at a later date, also advised to self isolate.    The patient was given detailed return precautions and advised to return to the emergency department if any new symptoms developed, symptoms worsened or for any concerns. The patient was offered the opportunity to ask questions and verbalized that they understand the diagnosis and discharge instructions.

## 2023-01-12 NOTE — CDI QUERY NOTE - NSCDIOTHERTXTBX_GEN_ALL_CORE_HH
CLINICAL INDICATORS:    HP 12/1- 37 YOF Hx: T1DD, ?IBS, GERD presents to the ED complaining of cough, chest pain, HA, & weakness for a couple of days & nausea/abdominal pain for 1 week. tested herself for COVID 2 days ago and was negative, subsequently found positive in the ED here. Pt states her symptoms are very similar to prior COVID infections (last 1mo ago), has remained with cough since then but has had general worsening of symptoms over the past two days; A/P: #mild covid infection; Attestation: #?Old COVID-19 infection / Persistent cough    Hep CS 12/1- Mild elevation of transaminases in past. Now has mild elevation of ALP along with transaminases which is improving. Current rise may be related to COVID but NAFLD is likely    DCS 12/2- #?Old COVID-19 infection    Sars-CoV-2 11/30: Detected    Orders: BED Clean, service MED, Isolation: Airborne/Contact, confirmed COVID-19; Diagnosis: Leukocytosis, chest pain    MAR:  Symbicort 160 Microgram, inhaled 2 puffs BID, admin 12/2 once  Proventil HFA, inhaled 2 puffs Q6 PRN for SOB and or wheezing, admin 12/1-2  (not administered)    Based on the above clinical indicators, and your professional judgment, can the diagnosis of COVID be further clarified?    -	COVID infection current, active  -	Old COVID, no current active infection  -	Other, please specify  -	Unable to determine    Thank you,  Leydi Hogue, RN, BSN, BBA, CCDS, CCS  [phone]
CLINICAL INDICATORS:    HP 12/1- 37 YOF Hx: T1DD, ?IBS, GERD presents to the ED complaining of cough, chest pain, HA, & weakness for a couple of days & nausea/abdominal pain for 1 week.    Hepatology CS 12/1- reason: elevated liver enzymes. ROS: no fever, chills, chest pain, cough, or dyspnea. No jaundice. 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 negative hemochromatosis; #Constipation; Mild elevation of transaminases in past. Now has mild elevation of ALP along with transaminases which is improving. Current rise may be related to COVID but NAFLD is likely    DCS- Admitted for leukocytosis. Leukocytosis- likely reactive due to vomiting; +BMs. Decreased abdominal discomfort after BMs; Chronic abdominal pain; NAFLD; ?Old COVID-19 infection/persistent cough; PRINCIPAL DISCHARGE DIAGNOSIS: Abdominal pain. CT chest was done to rule out clot in your lungs, it came back negative. CT abdomen was also done which didn't show signs of inflamed gall bladder or any emergent problem. Please schedule EGD, colonoscopy    Liver panel trend:  Tbili: 0.2, <0.2, 0.2  AST: 47, 20, 16  ALT: 52, 37, 35  AlkP: 153, 124, 120  Tptn: 7.8, 6.9, 7.0    Sars-CoV-2 11/30: Detected    MAR:   0.9% bolus 1000ml over 60min, admin 11/30 then rate 75 stop after 12 hr admin  12/1 then rate 75 stop after 24 hr 12/1-2  Magnesium oxide 400mg PO BID, admin 12/1-2  Magnesium sulfate 2G IVPB once over 120 min, admin 12/1, indication  hypomagnesemia  Miralax 17G Po Q12 hr, admin 12/1-2  Senna 1 tab PO at HS, admin 12/1-2    CT Angio chest PE protocol w/IV Cont 11/30- No evidence of pulmonary embolism.  Subpleural nodule again noted in the left midlung which may be fibrotic or a  lymph node. Follow-up in 6 months is recommended.  CT abdomen and pelvis IC 11/30- IMPRESSION: Sub-centimeter lymph nodes within  the right lower quadrant. Question reactive to a viral syndrome. Appendix not  identified with no  inflammatory changes to suggest acute appendicitis. Fatty liver. Subpleural  nodule left midlung which could be fibrotic in nature. Seen on prior CAT scan  abdomen and pelvis or on CT chest October 20, 2022. Findings are therefore  likely fibrotic in nature.    Based on the above clinical indicators, and your professional judgment, can the  etiology of the patient's symptoms be further clarified?    -	Patient's symptoms possibly sequelae of recent COVID  -	Patient's symptoms possibly associated with long COVID w/current infection  -	Patient's symptoms possibly associated with constipation  -	Patient's symptoms possibly associated with fatty liver disease  -	Other, please specify:  -	Unable to determine    Thank you!  Leydi Hogue RN  [phone]

## 2023-01-12 NOTE — ED PROVIDER NOTE - ATTENDING APP SHARED VISIT CONTRIBUTION OF CARE
36 y/o female h/o DM, GERD, sinus tachycardia, COVID two months ago, admitted 11/30-12/2 with cough / leukocytosis now p/w sore throat, cough and left ear pain, seen in ED 1/10 and started on PO abx, flu positive identified 1/11, denies fever, hemoptysis, orthopnea, PND, chest / abd pain, n/v/d, LE pain or swelling, recent immobilization or travel or other associated complaints at present. Old chart reviewed. I have reviewed and agree with the initial nursing note, except as documented in my note.     VSS, awake, alert, non-toxic appearing, ears clear, oropharynx clear, no skin rash or lesions, no tracheal deviation, non-labored breathing without accessory muscle use apparent or pursed lip breathing, no retractions, speaks full sentences, chest CTAB, no w/r/r, +S1/S2, RRR, no m/r/g, abdomen soft, NT, ND, +BS, AO x 3, clear speech and steady gait.

## 2023-01-12 NOTE — ED PROVIDER NOTE - PATIENT PORTAL LINK FT
You can access the FollowMyHealth Patient Portal offered by [facility] by registering at the following website: [email or web address]. By joining [facility]’s FollowMyHealth portal, you will also be able to view your health information using other applications (apps) compatible with our system.

## 2023-01-12 NOTE — ED PROVIDER NOTE - PHYSICAL EXAMINATION
VITAL SIGNS: I have reviewed nursing notes and confirm.  CONSTITUTIONAL: Well-developed; well-nourished; in no acute distress.  SKIN: Skin exam is warm and dry, no acute rash.  HEAD: Normocephalic; atraumatic.  EYES: Conjunctiva and sclera clear.  ENT: No nasal discharge; airway clear. TMs clear.  NECK: Supple; non tender.  CARD: S1, S2 normal; no murmurs, gallops, or rubs. Tachycardic, regular.   RESP: No wheezes, rales or rhonchi. Speaking in full sentences.   ABD: Normal bowel sounds; soft; non-distended; non-tender; No rebound or guarding. No CVA tenderness.  EXT: Normal ROM. No clubbing, cyanosis or edema. No calf TTP or swelling.   NEURO: Alert, oriented. Grossly unremarkable. No focal deficits.

## 2023-01-12 NOTE — ED PROVIDER NOTE - NS ED ROS FT
Review of Systems  Constitutional:  (+) fever, chills.  Eyes:  No visual changes, eye pain, or discharge.  ENMT:  No hearing changes, or discharge. No nasal congestion, discharge, or bleeding. No throat swelling, or difficulty swallowing. (+) sore throat, L ear pain  Cardiac:  No chest pain, palpitations, syncope, or edema.  Respiratory:  No dyspnea. No hemoptysis. (+) cough  GI:  No nausea, vomiting, diarrhea, or abdominal pain.   :  No dysuria, hematuria, frequency, or burning.   MS:  No back pain.  Skin:  No skin rash, pruritis, jaundice, or lesions.  Neuro:  No headache, dizziness, loss of sensation, or focal weakness.  No change in mental status.

## 2023-01-13 ENCOUNTER — NON-APPOINTMENT (OUTPATIENT)
Age: 38
End: 2023-01-13

## 2023-01-13 NOTE — CHART NOTE - NSCHARTNOTEFT_GEN_A_CORE
Pt with suspected old COVID, no current active infection  Patient's symptoms likely sequelae of recent COVID

## 2023-01-14 LAB
CULTURE RESULTS: SIGNIFICANT CHANGE UP
SPECIMEN SOURCE: SIGNIFICANT CHANGE UP

## 2023-01-18 ENCOUNTER — APPOINTMENT (OUTPATIENT)
Dept: INTERNAL MEDICINE | Facility: CLINIC | Age: 38
End: 2023-01-18
Payer: MEDICAID

## 2023-01-18 ENCOUNTER — OUTPATIENT (OUTPATIENT)
Dept: OUTPATIENT SERVICES | Facility: HOSPITAL | Age: 38
LOS: 1 days | Discharge: HOME | End: 2023-01-18

## 2023-01-18 VITALS
HEIGHT: 64 IN | HEART RATE: 110 BPM | SYSTOLIC BLOOD PRESSURE: 134 MMHG | DIASTOLIC BLOOD PRESSURE: 84 MMHG | TEMPERATURE: 96.6 F | OXYGEN SATURATION: 98 %

## 2023-01-18 DIAGNOSIS — R79.89 OTHER SPECIFIED ABNORMAL FINDINGS OF BLOOD CHEMISTRY: ICD-10-CM

## 2023-01-18 DIAGNOSIS — D50.9 IRON DEFICIENCY ANEMIA, UNSPECIFIED: ICD-10-CM

## 2023-01-18 PROCEDURE — 99214 OFFICE O/P EST MOD 30 MIN: CPT | Mod: GC

## 2023-01-18 NOTE — ASSESSMENT
[FreeTextEntry1] : 37 year old female with a PMH of chronic pelvic pain, Type 1 DM with chronic polyneuropathy of the lower extremities on an insulin pump, recurrent UTIs, PCOS, GERD, anxiety, and migraines presents for post hospital discharge follow up . Patient has been admitted 12/1-12/2/2022 in hospital for leucocytosis.  Patient had CTAP: no GB wall stranding but fatty infiltrates of liver;  patient had mild transaminitis on admission, but RUQ US: no acute pathology. Patient was discharge with OP follow up with GI.\par \par #Recent admission for Leukocytosis, Elevated lactate, abdominal pain - resolved\par #NAFLD\par -CTAP: no GB wall stranding but fatty infiltrates of liver\par - RUQ US: no acute pathology\par - GI and Hepatologist referral\par \par # pulmonary nodule\par # chronic cough\par - pulm f/u\par - albuterol PRN\par  \par #Chronic Sinus Tachycardia\par -follows Dr. Christian\par -c/w metoprolol\par -c/w ASA\par \par #Depression/Anxiety\par -c/w clonazepam, lexapro, amitriptyline\par  \par #Obesity\par -wt loss advised\par \par # iron deficiency anemia\par - recent labs consistent with Iron deficiency: Iron 23, % sat 7\par - iron supplement start\par \par # type 1 DM:\par - A1c 9.4 on 12/22\par - Follows up with endocrinology \par \par HCM\par - Follows GYN regularly\par - Opthalmology f/u up to date\par RTC in 6 months or PRN\par

## 2023-01-18 NOTE — PHYSICAL EXAM
[No Acute Distress] : no acute distress [Well Nourished] : well nourished [Well Developed] : well developed [Normal Sclera/Conjunctiva] : normal sclera/conjunctiva [EOMI] : extraocular movements intact [No Lymphadenopathy] : no lymphadenopathy [Supple] : supple [No Respiratory Distress] : no respiratory distress  [No Accessory Muscle Use] : no accessory muscle use [Clear to Auscultation] : lungs were clear to auscultation bilaterally [Normal Rate] : normal rate  [Regular Rhythm] : with a regular rhythm [Normal S1, S2] : normal S1 and S2 [No Varicosities] : no varicosities [No Edema] : there was no peripheral edema [No Extremity Clubbing/Cyanosis] : no extremity clubbing/cyanosis [Soft] : abdomen soft [Non Tender] : non-tender [No Joint Swelling] : no joint swelling [No Rash] : no rash [Normal Affect] : the affect was normal [Normal Insight/Judgement] : insight and judgment were intact

## 2023-01-18 NOTE — HISTORY OF PRESENT ILLNESS
[FreeTextEntry1] :  follow up [de-identified] : 37 year old female with a PMH of chronic pelvic pain, Type 1 DM with chronic polyneuropathy of the lower extremities on an insulin pump, recurrent UTIs, PCOS, GERD, anxiety, and migraines presents for post hospital discharge follow up . Patient has been admitted 12/1-12/2/2022 in hospital for leucocytosis.  Patient had CTAP: no GB wall stranding but fatty infiltrates of liver;  patient had mild transaminitis on admission, but RUQ US: no acute pathology. Patient was discharge with OP follow up with GI.\par Patient would like to see pulmonologist due to subpleural nodule on the left mid lung found on the recent admission. Patient also would like to see Liver specialist Dr Alexander Luna. \par Patient denies any complaints. Reports recent flu infection, which is resolved now.\par \par \par

## 2023-01-18 NOTE — REVIEW OF SYSTEMS
[Fever] : no fever [Chills] : no chills [Fatigue] : no fatigue [Discharge] : no discharge [Earache] : no earache [Hearing Loss] : no hearing loss [Chest Pain] : no chest pain [Palpitations] : no palpitations [Shortness Of Breath] : no shortness of breath [Wheezing] : no wheezing [Cough] : no cough [Abdominal Pain] : no abdominal pain [Nausea] : no nausea [Constipation] : no constipation [Dysuria] : no dysuria [Incontinence] : no incontinence [Joint Pain] : no joint pain [Joint Stiffness] : no joint stiffness [Itching] : no itching [Headache] : no headache [Dizziness] : no dizziness [Anxiety] : no anxiety [Depression] : no depression

## 2023-01-19 ENCOUNTER — APPOINTMENT (OUTPATIENT)
Dept: ENDOCRINOLOGY | Facility: CLINIC | Age: 38
End: 2023-01-19
Payer: MEDICAID

## 2023-01-19 VITALS
SYSTOLIC BLOOD PRESSURE: 124 MMHG | HEART RATE: 85 BPM | WEIGHT: 187 LBS | BODY MASS INDEX: 31.92 KG/M2 | OXYGEN SATURATION: 98 % | HEIGHT: 64 IN | TEMPERATURE: 97.4 F | DIASTOLIC BLOOD PRESSURE: 80 MMHG

## 2023-01-19 PROCEDURE — 99214 OFFICE O/P EST MOD 30 MIN: CPT

## 2023-01-19 NOTE — REVIEW OF SYSTEMS
[Palpitations] : palpitations [Depression] : depression [Anxiety] : anxiety [Stress] : stress [As Noted in HPI] : as noted in HPI [Fatigue] : no fatigue [Decreased Appetite] : appetite not decreased [Recent Weight Gain (___ Lbs)] : no recent weight gain [Recent Weight Loss (___ Lbs)] : no recent weight loss [Visual Field Defect] : no visual field defect [Blurred Vision] : no blurred vision [Dysphagia] : no dysphagia [Dysphonia] : no dysphonia [Shortness Of Breath] : no shortness of breath [Cough] : no cough [Orthopnea] : no orthopnea [PND] : no Paroxysmal Nocturnal Dyspnea [Nausea] : no nausea [Constipation] : no constipation [Vomiting] : no vomiting [Diarrhea] : no diarrhea [Nocturia] : no nocturia [Acanthosis] : no acanthosis  [Acne] : no acne [Dry Skin] : no dry skin [Hirsutism] : no hirsutism [Headaches] : no headaches [Tremors] : no tremors [Suicidal Ideation] : no suicidal ideation [Cold Intolerance] : no cold intolerance [Heat Intolerance] : no heat intolerance

## 2023-01-19 NOTE — PHYSICAL EXAM
[Alert] : alert [Well Nourished] : well nourished [Healthy Appearance] : healthy appearance [No Acute Distress] : no acute distress [No Proptosis] : no proptosis [No Lid Lag] : no lid lag [No LAD] : no lymphadenopathy [Thyroid Not Enlarged] : the thyroid was not enlarged [No Thyroid Nodules] : no palpable thyroid nodules [No Accessory Muscle Use] : no accessory muscle use [Clear to Auscultation] : lungs were clear to auscultation bilaterally [Normal S1, S2] : normal S1 and S2 [No Murmurs] : no murmurs [Normal Rate] : heart rate was normal [Soft] : abdomen soft [No CVA Tenderness] : no ~M costovertebral angle tenderness [No Stigmata of Cushings Syndrome] : no stigmata of Cushings Syndrome [Normal Reflexes] : deep tendon reflexes were 2+ and symmetric [Oriented x3] : oriented to person, place, and time [Abdominal Striae] : no abdominal striae [Acanthosis Nigricans] : no acanthosis nigricans [Hirsutism] : no hirsutism [de-identified] : Hanover hump

## 2023-01-19 NOTE — ASSESSMENT
[Diabetes Foot Care] : diabetes foot care [Long Term Vascular Complications] : long term vascular complications of diabetes [Carbohydrate Consistent Diet] : carbohydrate consistent diet [Importance of Diet and Exercise] : importance of diet and exercise to improve glycemic control, achieve weight loss and improve cardiovascular health [Exercise/Effect on Glucose] : exercise/effect on glucose [Hypoglycemia Management] : hypoglycemia management [Action and use of Insulin] : action and use of short and long-acting insulin [Self Monitoring of Blood Glucose] : self monitoring of blood glucose [Insulin Self-Administration] : insulin self-administration [Injection Technique, Storage, Sharps Disposal] : injection technique, storage, and sharps disposal [Weight Loss] : weight loss [FreeTextEntry1] : 37 year old patient with type 1 DM on insulin pump presents for  follow up \par \par \par #poorly controlled type 1 DM  on insulin pump Medtronic 630 G \par - log reviewed , pattern of hyperglycemia pre and post meals , attributed to recent infections but also to non compliance with diet , per mother and mario , she was not watching her carbs and eating snack \par - basal rate changed  \par 12am-6am : increased to 1.9\par 6am -12pm : increased to 1.6 \par 12pm-12am increased to 2.1  u/hr \par \par - I: C ratio changed to 1: 9  with dinner but 1: 10 g with breakfast and lunch  from 1:12 and ISF 1: 60 from 1: 75 \par - discussed behavioral changes \par - uptodate with eye exam , has cataract \par - periods regular, has hirsutism total test ok following with GYn, might benefit from metformin in the future , prefer not to start \par - LDL ok , will benefit from statin if higher given long standing type 1 DM \par - following with podiatry , planned to see pulmonary \par \par \par

## 2023-01-19 NOTE — DATA REVIEWED
awaiting bed, no change [FreeTextEntry1] : previous data and record reviewed \par 1/15/21:  hba1c 8.7% glucose 203  LDL 116crea 0.7  AST 15  ALT 18  hb 13.6 \par 6/1/21: Hba1c 8.9% glucose 283  crea0.62  \par 10/4/21: HBA1c 10.8% glucose 190   TSH 1.310  ft4 1.22 ALT 56 \par \par 9/30/22: HBA1c 9.9%   hb 8.3 crea 0.63    alk phos 138  alb/crea  39  25 vit D 25.2 TSH 1.02\par \par 1/2023:A1c 9.8%  hb 9.6  glucose 355 crea 0.63   AST 29  ALT 52     ACR 24  TSH 0.791 fructosamine 440 b12 715

## 2023-01-20 ENCOUNTER — APPOINTMENT (OUTPATIENT)
Dept: PSYCHIATRY | Facility: CLINIC | Age: 38
End: 2023-01-20

## 2023-01-20 ENCOUNTER — OUTPATIENT (OUTPATIENT)
Dept: OUTPATIENT SERVICES | Facility: HOSPITAL | Age: 38
LOS: 1 days | Discharge: HOME | End: 2023-01-20

## 2023-01-20 DIAGNOSIS — E66.9 OBESITY, UNSPECIFIED: ICD-10-CM

## 2023-01-20 DIAGNOSIS — F41.1 GENERALIZED ANXIETY DISORDER: ICD-10-CM

## 2023-01-20 DIAGNOSIS — K21.9 GASTRO-ESOPHAGEAL REFLUX DISEASE WITHOUT ESOPHAGITIS: ICD-10-CM

## 2023-01-20 DIAGNOSIS — E28.2 POLYCYSTIC OVARIAN SYNDROME: ICD-10-CM

## 2023-01-20 DIAGNOSIS — E10.9 TYPE 1 DIABETES MELLITUS WITHOUT COMPLICATIONS: ICD-10-CM

## 2023-01-20 DIAGNOSIS — D50.9 IRON DEFICIENCY ANEMIA, UNSPECIFIED: ICD-10-CM

## 2023-01-20 DIAGNOSIS — R79.89 OTHER SPECIFIED ABNORMAL FINDINGS OF BLOOD CHEMISTRY: ICD-10-CM

## 2023-01-20 DIAGNOSIS — E11.9 TYPE 2 DIABETES MELLITUS WITHOUT COMPLICATIONS: ICD-10-CM

## 2023-01-20 DIAGNOSIS — I10 ESSENTIAL (PRIMARY) HYPERTENSION: ICD-10-CM
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
## 2023-01-20 NOTE — DISCUSSION/SUMMARY
[FreeTextEntry1] : Ms. Ania Mei is a 36yo single  female.   Ania reported severe severe stressors sine beginning of 2020 , including homelessness , changes in living situation, stress over the covid pandemic with losses within the family including his father on 11/28.  Ania reported increased of anxiety with episodes of falling, generalized shakiness, head pressure and inability to move, patients EEG and video EEG were reportedly negative for any seizure activity \par \par No suicidal or homicidal thoughts. No overt psychosis or alvina on exam. Patient has appropriate protective factors in place. Is engaged in his treatment. No acute safety concerns. Outpatient level of care remains appropriate.\par Risks, benefits, and alteratives of treatment options reviewed as well as importance of compliance with chosen options. Patient demonstrated an understanding of above and is amenable with plan.\par \par \par decrease klonpin 0.5mg BID to 0.5mg po qhs w/ plans to taper off \par Escitalopram 20mg po daily\par RTC 2 weeks

## 2023-01-20 NOTE — HISTORY OF PRESENT ILLNESS
[FreeTextEntry1] : \par  [FreeTextEntry3] : celexa\par cymbalta (not effective)\par zyprexa\par buspar (not effective)\par gabapentin (hives)\par melatonin  [No] : no [de-identified] : Ms. Ania Mei is a 36 year old single  female of [place] descent, and she resides in [facility] an adult assisted living facility for “a little bit over a year” with her parents after they became homeless 2019. She reports a medical history relevant for diabetes, diabetic neuropathy, back pain, migraine headaches, hand & leg tremors, tachycardia, GERD, & vulvodynia, and she has a psychiatric history relevant for anxiety disorder & bipolar disorder as well as 4 IPP admissions for suicidal ideations & attempts about 20 years ago. She reported that she was last in therapy “4 or 5 years ago” in this clinic program.  \par \par Ms. Mei reported that she initially diagnosed with bipolar disorder "right after 911", that was when she first experienced suicidal ideations, and had her first IPP admission. She reported being in treatment at [facility] until she turned 18 years old and did not engage in treatment again until she was "31 or 32 years old. She reported that she attended this clinic, had 2 sessions and never returned to treatment.\par \par  She reports that she has had a great deal of stressors, she and her parents became homeless "just before the pandemic", they lived between hotels and their car, her father became severe medically ill (cancer on his nose, liver issues, kidney issues, gall bladder issues, nodule on his lung), after her father had surgery the [facility] was able to facilitate a place for the family and they have resided in [facility]  since 10/2020. With all these stressors in her life she noticed her anxiety started to spike, she also witnessed several residents die, her aunt  from COVID.\par \par She reports being anxious constantly where she experiences dizziness, light handedness, chest pains, heart palpitations, and stomach pains “and they have told me I’m having anxiety & panic attacks”. She reported that she saw the psychiatrist at her residence and she prescribed Buspar & melatonin but she “did not feel right on it” and she stopped taking the medications. \par
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
## 2023-01-20 NOTE — REASON FOR VISIT
[Patient] : Patient [FreeTextEntry1] : Patient is a 37 year old female being seen for a followup therapy session. COVID screening is negative.

## 2023-01-20 NOTE — PHYSICAL EXAM
[Cooperative] : cooperative [Euthymic] : euthymic [Full] : full [Clear] : clear [Linear/Goal Directed] : linear/goal directed [WNL] : within normal limits [Average] : average [de-identified] : Fair [de-identified] : Fair

## 2023-01-20 NOTE — SOCIAL HISTORY
[Assisted Living Facility] : lives in an assisted living facility [Disabled] : disabled [Never ] : never  [High School] : high school [None] : none [FreeTextEntry1] : [facility] for the past 2.5yrs [No Known Substance Use] : no known substance use [Yes] : yes [No Known Use] : no known use [TextBox_52] : Prescribed

## 2023-01-31 ENCOUNTER — EMERGENCY (EMERGENCY)
Facility: HOSPITAL | Age: 38
LOS: 0 days | Discharge: HOME | End: 2023-01-31
Attending: STUDENT IN AN ORGANIZED HEALTH CARE EDUCATION/TRAINING PROGRAM | Admitting: STUDENT IN AN ORGANIZED HEALTH CARE EDUCATION/TRAINING PROGRAM
Payer: MEDICAID

## 2023-01-31 VITALS
SYSTOLIC BLOOD PRESSURE: 134 MMHG | HEART RATE: 121 BPM | OXYGEN SATURATION: 99 % | RESPIRATION RATE: 18 BRPM | TEMPERATURE: 100 F | WEIGHT: 190.04 LBS | DIASTOLIC BLOOD PRESSURE: 69 MMHG | HEIGHT: 64 IN

## 2023-01-31 VITALS — HEART RATE: 110 BPM

## 2023-01-31 DIAGNOSIS — R09.81 NASAL CONGESTION: ICD-10-CM

## 2023-01-31 DIAGNOSIS — Z88.7 ALLERGY STATUS TO SERUM AND VACCINE: ICD-10-CM

## 2023-01-31 DIAGNOSIS — N39.0 URINARY TRACT INFECTION, SITE NOT SPECIFIED: ICD-10-CM

## 2023-01-31 DIAGNOSIS — F32.A DEPRESSION, UNSPECIFIED: ICD-10-CM

## 2023-01-31 DIAGNOSIS — R51.9 HEADACHE, UNSPECIFIED: ICD-10-CM

## 2023-01-31 DIAGNOSIS — R00.0 TACHYCARDIA, UNSPECIFIED: ICD-10-CM

## 2023-01-31 DIAGNOSIS — R05.1 ACUTE COUGH: ICD-10-CM

## 2023-01-31 DIAGNOSIS — J02.9 ACUTE PHARYNGITIS, UNSPECIFIED: ICD-10-CM

## 2023-01-31 DIAGNOSIS — K21.9 GASTRO-ESOPHAGEAL REFLUX DISEASE WITHOUT ESOPHAGITIS: ICD-10-CM

## 2023-01-31 DIAGNOSIS — E83.42 HYPOMAGNESEMIA: ICD-10-CM

## 2023-01-31 DIAGNOSIS — Z87.39 PERSONAL HISTORY OF OTHER DISEASES OF THE MUSCULOSKELETAL SYSTEM AND CONNECTIVE TISSUE: ICD-10-CM

## 2023-01-31 DIAGNOSIS — R11.0 NAUSEA: ICD-10-CM

## 2023-01-31 DIAGNOSIS — M79.10 MYALGIA, UNSPECIFIED SITE: ICD-10-CM

## 2023-01-31 DIAGNOSIS — Z79.82 LONG TERM (CURRENT) USE OF ASPIRIN: ICD-10-CM

## 2023-01-31 DIAGNOSIS — Z88.1 ALLERGY STATUS TO OTHER ANTIBIOTIC AGENTS STATUS: ICD-10-CM

## 2023-01-31 DIAGNOSIS — E10.9 TYPE 1 DIABETES MELLITUS WITHOUT COMPLICATIONS: ICD-10-CM

## 2023-01-31 DIAGNOSIS — Z86.16 PERSONAL HISTORY OF COVID-19: ICD-10-CM

## 2023-01-31 DIAGNOSIS — Z88.8 ALLERGY STATUS TO OTHER DRUGS, MEDICAMENTS AND BIOLOGICAL SUBSTANCES STATUS: ICD-10-CM

## 2023-01-31 DIAGNOSIS — Z79.4 LONG TERM (CURRENT) USE OF INSULIN: ICD-10-CM

## 2023-01-31 DIAGNOSIS — R10.9 UNSPECIFIED ABDOMINAL PAIN: ICD-10-CM

## 2023-01-31 DIAGNOSIS — Z20.822 CONTACT WITH AND (SUSPECTED) EXPOSURE TO COVID-19: ICD-10-CM

## 2023-01-31 DIAGNOSIS — Z86.69 PERSONAL HISTORY OF OTHER DISEASES OF THE NERVOUS SYSTEM AND SENSE ORGANS: ICD-10-CM

## 2023-01-31 DIAGNOSIS — Z88.0 ALLERGY STATUS TO PENICILLIN: ICD-10-CM

## 2023-01-31 LAB
ALBUMIN SERPL ELPH-MCNC: 3.9 G/DL — SIGNIFICANT CHANGE UP (ref 3.5–5.2)
ALP SERPL-CCNC: 123 U/L — HIGH (ref 30–115)
ALT FLD-CCNC: 43 U/L — HIGH (ref 0–41)
ANION GAP SERPL CALC-SCNC: 12 MMOL/L — SIGNIFICANT CHANGE UP (ref 7–14)
APPEARANCE UR: ABNORMAL
AST SERPL-CCNC: 30 U/L — SIGNIFICANT CHANGE UP (ref 0–41)
BACTERIA # UR AUTO: ABNORMAL
BASOPHILS # BLD AUTO: 0.01 K/UL — SIGNIFICANT CHANGE UP (ref 0–0.2)
BASOPHILS NFR BLD AUTO: 0.1 % — SIGNIFICANT CHANGE UP (ref 0–1)
BILIRUB SERPL-MCNC: 0.3 MG/DL — SIGNIFICANT CHANGE UP (ref 0.2–1.2)
BILIRUB UR-MCNC: NEGATIVE — SIGNIFICANT CHANGE UP
BUN SERPL-MCNC: 11 MG/DL — SIGNIFICANT CHANGE UP (ref 10–20)
CALCIUM SERPL-MCNC: 8.9 MG/DL — SIGNIFICANT CHANGE UP (ref 8.4–10.4)
CHLORIDE SERPL-SCNC: 95 MMOL/L — LOW (ref 98–110)
CO2 SERPL-SCNC: 22 MMOL/L — SIGNIFICANT CHANGE UP (ref 17–32)
COLOR SPEC: YELLOW — SIGNIFICANT CHANGE UP
CREAT SERPL-MCNC: 0.6 MG/DL — LOW (ref 0.7–1.5)
DIFF PNL FLD: ABNORMAL
EGFR: 118 ML/MIN/1.73M2 — SIGNIFICANT CHANGE UP
EOSINOPHIL # BLD AUTO: 0.01 K/UL — SIGNIFICANT CHANGE UP (ref 0–0.7)
EOSINOPHIL NFR BLD AUTO: 0.1 % — SIGNIFICANT CHANGE UP (ref 0–8)
EPI CELLS # UR: 15 /HPF — HIGH (ref 0–5)
FLUAV AG NPH QL: SIGNIFICANT CHANGE UP
FLUBV AG NPH QL: SIGNIFICANT CHANGE UP
GLUCOSE SERPL-MCNC: 164 MG/DL — HIGH (ref 70–99)
GLUCOSE UR QL: ABNORMAL
HCG SERPL QL: NEGATIVE — SIGNIFICANT CHANGE UP
HCT VFR BLD CALC: 37.6 % — SIGNIFICANT CHANGE UP (ref 37–47)
HGB BLD-MCNC: 10.7 G/DL — LOW (ref 12–16)
HYALINE CASTS # UR AUTO: 4 /LPF — SIGNIFICANT CHANGE UP (ref 0–7)
IMM GRANULOCYTES NFR BLD AUTO: 0.5 % — HIGH (ref 0.1–0.3)
KETONES UR-MCNC: SIGNIFICANT CHANGE UP
LEUKOCYTE ESTERASE UR-ACNC: ABNORMAL
LYMPHOCYTES # BLD AUTO: 0.72 K/UL — LOW (ref 1.2–3.4)
LYMPHOCYTES # BLD AUTO: 7.9 % — LOW (ref 20.5–51.1)
MAGNESIUM SERPL-MCNC: 1.5 MG/DL — LOW (ref 1.8–2.4)
MCHC RBC-ENTMCNC: 20.5 PG — LOW (ref 27–31)
MCHC RBC-ENTMCNC: 28.5 G/DL — LOW (ref 32–37)
MCV RBC AUTO: 72 FL — LOW (ref 81–99)
MONOCYTES # BLD AUTO: 0.39 K/UL — SIGNIFICANT CHANGE UP (ref 0.1–0.6)
MONOCYTES NFR BLD AUTO: 4.3 % — SIGNIFICANT CHANGE UP (ref 1.7–9.3)
NEUTROPHILS # BLD AUTO: 7.95 K/UL — HIGH (ref 1.4–6.5)
NEUTROPHILS NFR BLD AUTO: 87.1 % — HIGH (ref 42.2–75.2)
NITRITE UR-MCNC: NEGATIVE — SIGNIFICANT CHANGE UP
NRBC # BLD: 0 /100 WBCS — SIGNIFICANT CHANGE UP (ref 0–0)
PH UR: 6 — SIGNIFICANT CHANGE UP (ref 5–8)
PLATELET # BLD AUTO: 546 K/UL — HIGH (ref 130–400)
POTASSIUM SERPL-MCNC: 4.6 MMOL/L — SIGNIFICANT CHANGE UP (ref 3.5–5)
POTASSIUM SERPL-SCNC: 4.6 MMOL/L — SIGNIFICANT CHANGE UP (ref 3.5–5)
PROT SERPL-MCNC: 7.8 G/DL — SIGNIFICANT CHANGE UP (ref 6–8)
PROT UR-MCNC: ABNORMAL
RBC # BLD: 5.22 M/UL — SIGNIFICANT CHANGE UP (ref 4.2–5.4)
RBC # FLD: 24.9 % — HIGH (ref 11.5–14.5)
RBC CASTS # UR COMP ASSIST: 25 /HPF — HIGH (ref 0–4)
RSV RNA NPH QL NAA+NON-PROBE: SIGNIFICANT CHANGE UP
SARS-COV-2 RNA SPEC QL NAA+PROBE: SIGNIFICANT CHANGE UP
SODIUM SERPL-SCNC: 129 MMOL/L — LOW (ref 135–146)
SP GR SPEC: 1.02 — SIGNIFICANT CHANGE UP (ref 1.01–1.03)
UROBILINOGEN FLD QL: SIGNIFICANT CHANGE UP
WBC # BLD: 9.13 K/UL — SIGNIFICANT CHANGE UP (ref 4.8–10.8)
WBC # FLD AUTO: 9.13 K/UL — SIGNIFICANT CHANGE UP (ref 4.8–10.8)
WBC UR QL: 8 /HPF — HIGH (ref 0–5)

## 2023-01-31 PROCEDURE — 71045 X-RAY EXAM CHEST 1 VIEW: CPT | Mod: 26

## 2023-01-31 PROCEDURE — 93010 ELECTROCARDIOGRAM REPORT: CPT

## 2023-01-31 PROCEDURE — 99284 EMERGENCY DEPT VISIT MOD MDM: CPT

## 2023-01-31 RX ORDER — ONDANSETRON 8 MG/1
1 TABLET, FILM COATED ORAL
Qty: 6 | Refills: 0
Start: 2023-01-31 | End: 2023-02-01

## 2023-01-31 RX ORDER — NITROFURANTOIN MACROCRYSTAL 50 MG
1 CAPSULE ORAL
Qty: 14 | Refills: 0
Start: 2023-01-31 | End: 2023-02-06

## 2023-01-31 RX ORDER — SODIUM CHLORIDE 9 MG/ML
1000 INJECTION, SOLUTION INTRAVENOUS ONCE
Refills: 0 | Status: COMPLETED | OUTPATIENT
Start: 2023-01-31 | End: 2023-01-31

## 2023-01-31 RX ORDER — MAGNESIUM SULFATE 500 MG/ML
2 VIAL (ML) INJECTION ONCE
Refills: 0 | Status: COMPLETED | OUTPATIENT
Start: 2023-01-31 | End: 2023-01-31

## 2023-01-31 RX ORDER — ONDANSETRON 8 MG/1
4 TABLET, FILM COATED ORAL ONCE
Refills: 0 | Status: COMPLETED | OUTPATIENT
Start: 2023-01-31 | End: 2023-01-31

## 2023-01-31 RX ORDER — METOCLOPRAMIDE HCL 10 MG
10 TABLET ORAL ONCE
Refills: 0 | Status: COMPLETED | OUTPATIENT
Start: 2023-01-31 | End: 2023-01-31

## 2023-01-31 RX ORDER — ACETAMINOPHEN 500 MG
650 TABLET ORAL ONCE
Refills: 0 | Status: COMPLETED | OUTPATIENT
Start: 2023-01-31 | End: 2023-01-31

## 2023-01-31 RX ADMIN — Medication 25 GRAM(S): at 18:09

## 2023-01-31 RX ADMIN — Medication 104 MILLIGRAM(S): at 16:48

## 2023-01-31 RX ADMIN — SODIUM CHLORIDE 1000 MILLILITER(S): 9 INJECTION, SOLUTION INTRAVENOUS at 17:48

## 2023-01-31 RX ADMIN — SODIUM CHLORIDE 1000 MILLILITER(S): 9 INJECTION, SOLUTION INTRAVENOUS at 16:08

## 2023-01-31 RX ADMIN — Medication 650 MILLIGRAM(S): at 16:07

## 2023-01-31 RX ADMIN — Medication 10 MILLIGRAM(S): at 17:47

## 2023-01-31 RX ADMIN — ONDANSETRON 4 MILLIGRAM(S): 8 TABLET, FILM COATED ORAL at 19:39

## 2023-01-31 RX ADMIN — Medication 650 MILLIGRAM(S): at 17:47

## 2023-01-31 RX ADMIN — Medication 2 GRAM(S): at 21:18

## 2023-01-31 NOTE — ED ADULT TRIAGE NOTE - CHIEF COMPLAINT QUOTE
Pt BIBA for generalized pain, fever, and pseudoseizure this morning. Pt had 101 temp today and took motrin at 12

## 2023-01-31 NOTE — ED ADULT TRIAGE NOTE - WEIGHT METHOD
Continue supportive treatment for likely viral upper respiratory infection. Call or return if new or worsening symptoms, or if no significant improvement over the next 4-6 days. stated

## 2023-01-31 NOTE — ED PROVIDER NOTE - PHYSICAL EXAMINATION
CONSTITUTIONAL: non-toxic appearing female, no acute distress   SKIN: skin exam is warm and dry  EYES: PERRL, conjunctiva and sclera clear.  ENT: MMM   CARD: S1, S2 normal, no murmur  RESP: No wheezes, rales or rhonchi. Good air movement bilaterally  ABD: soft; non-distended; non-tender   EXT: Normal ROM   NEURO: awake, alert, following commands, oriented, grossly unremarkable. No Focal deficits. GCS 15.   PSYCH: Cooperative, appropriate.

## 2023-01-31 NOTE — ED ADULT NURSE NOTE - OBJECTIVE STATEMENT
PT presents with body aches, cough, pseudo seizures similar to her usual presentation lasting 3 minutes.  pt presents from nursing home. family at bedside. pt reports she was febrile  101 today took motrin. reports nausea and abd pain denies cp/sob.

## 2023-01-31 NOTE — ED PROVIDER NOTE - NSFOLLOWUPINSTRUCTIONS_ED_ALL_ED_FT
Urinary Tract Infection    A urinary tract infection (UTI) is an infection of any part of the urinary tract, which includes the kidneys, ureters, bladder, and urethra. Risk factors include ignoring your need to urinate, wiping back to front if female, being an uncircumcised male, and having diabetes or a weak immune system. Symptoms include frequent urination, pain or burning with urination, foul smelling urine, cloudy urine, pain in the lower abdomen, blood in the urine, and fever. If you were prescribed an antibiotic medicine, take it as told by your health care provider. Do not stop taking the antibiotic even if you start to feel better.    SEEK IMMEDIATE MEDICAL CARE IF YOU HAVE THE FOLLOWING SYMPTOMS: severe back or abdominal pain, inability to keep fluids or medicine down, dizziness/lightheadedness, or a change in mental status.          Acute Nausea and Vomiting    WHAT YOU NEED TO KNOW:    Acute nausea and vomiting start suddenly, worsen quickly, and last a short time.    DISCHARGE INSTRUCTIONS:    Return to the emergency department if:     You see blood in your vomit or your bowel movements.      You have sudden, severe pain in your chest and upper abdomen after hard vomiting or retching.      You have swelling in your neck and chest.       You are dizzy, cold, and thirsty and your eyes and mouth are dry.      You are urinating very little or not at all.      You have muscle weakness, leg cramps, and trouble breathing.       Your heart is beating much faster than normal.       You continue to vomit for more than 48 hours.     Contact your healthcare provider if:     You have frequent dry heaves (vomiting but nothing comes out).      Your nausea and vomiting does not get better or go away after you use medicine.      You have questions or concerns about your condition or treatment.    Medicines: You may need any of the following:     Medicines may be given to calm your stomach and stop your vomiting. You may also need medicines to help you feel more relaxed or to stop nausea and vomiting caused by motion sickness.      Gastrointestinal stimulants are used to help empty your stomach and bowels. This may help decrease nausea and vomiting.      Take your medicine as directed. Contact your healthcare provider if you think your medicine is not helping or if you have side effects. Tell him or her if you are allergic to any medicine. Keep a list of the medicines, vitamins, and herbs you take. Include the amounts, and when and why you take them. Bring the list or the pill bottles to follow-up visits. Carry your medicine list with you in case of an emergency.    Prevent or manage acute nausea and vomiting:     Do not drink alcohol. Alcohol may upset or irritate your stomach. Too much alcohol can also cause acute nausea and vomiting.      Control stress. Headaches due to stress may cause nausea and vomiting. Find ways to relax and manage your stress. Get more rest and sleep.      Drink more liquids as directed. Vomiting can lead to dehydration. It is important to drink more liquids to help replace lost body fluids. Ask your healthcare provider how much liquid to drink each day and which liquids are best for you. Your provider may recommend that you drink an oral rehydration solution (ORS). ORS contains water, salts, and sugar that are needed to replace the lost body fluids. Ask what kind of ORS to use, how much to drink, and where to get it.      Eat smaller meals, more often. Eat small amounts of food every 2 to 3 hours, even if you are not hungry. Food in your stomach may decrease your nausea.      Talk to your healthcare provider before you take over-the-counter (OTC) medicines. These medicines can cause serious problems if you use certain other medicines, or you have a medical condition. You may have problems if you use too much or use them for longer than the label says. Follow directions on the label carefully.     Follow up with your healthcare provider as directed: Write down your questions so you remember to ask them during your follow-up visits.       © Copyright [employer] 2019 All illustrations and images included in CareNotes are the copyrighted property of [email or web address] or [employer].

## 2023-01-31 NOTE — ED PROVIDER NOTE - ATTENDING APP SHARED VISIT CONTRIBUTION OF CARE
38 yo f hx DM1, depression, sinus tachycardia, pseudosz, gerd, sciatica  pt presents for eval of body aches, frontal ha, ST, nasal congestion, cough. pt w/ fever to 101F. pt had witnessed pseudosz this am. sx lasted 3 seconds, no post ictal period. no incontinence of stool/urine. immediate return to baseline. no vomiting/diarrhea/vision changes. pt w/ nonspecific abd discomfort. pt menstruating now.  pt recently tested positive for flu and had covid 1-2 months ago.  pt took albuterol for wheezing.      vs sinus tach  gen- NAD, aaox3  card-sinus tach  lungs-no increased WOB, ctab, no wheezing or rhonchi  abd-sntnd, no guarding or rebound  neuro- full str/sensation, cn ii-xii grossly intact, normal coordination  Spine- no midline c/t/l spine ttp, neck supple, FROM to neck

## 2023-01-31 NOTE — ED PROVIDER NOTE - OBJECTIVE STATEMENT
37 year old female, past medical history dm, depression, sinus tachycardia, pseudoseizures, GERD, bib ems from [facility] for eval. patient with body aches, cough, nausea that began this morning with fever tmax 101 today. patient with witnessed pseudoseizure this morning lasting ~3 seconds and self-resolving, no post-ictal period, no urinary/bowel incontinence. no recent travel or sick contacts.

## 2023-01-31 NOTE — ED PROVIDER NOTE - PATIENT PORTAL LINK FT
You can access the FollowMyHealth Patient Portal offered by [facility] by registering at the following website: [email or web address]. By joining [employer]’s FollowMyHealth portal, you will also be able to view your health information using other applications (apps) compatible with our system.

## 2023-01-31 NOTE — ED PROVIDER NOTE - CARE PROVIDER_API CALL
Davidson Navas)  Internal Medicine  [address] [place] [address]  Phone: [phone]  Fax: [phone]  Follow Up Time: 1-3 Days

## 2023-01-31 NOTE — ED PROVIDER NOTE - CARE PLAN
Principal Discharge DX:	Urinary tract infection  Secondary Diagnosis:	Nausea and vomiting  Secondary Diagnosis:	Headache  Secondary Diagnosis:	Hypomagnesemia   1

## 2023-01-31 NOTE — ED PROVIDER NOTE - CLINICAL SUMMARY MEDICAL DECISION MAKING FREE TEXT BOX
Throughout ED observation period, pt remained clinically and hemodynamically stable. Serial abd exams benign.   sx more consistent w/ urti, xr negative for pna  labs w/ mild hypomag- repleted  ua positive- will treat for uti w/ macrobid (pt w/ multiple allergies limiting abx options)  will dc w/ rec for OP f/u and return precautions

## 2023-02-02 ENCOUNTER — NON-APPOINTMENT (OUTPATIENT)
Age: 38
End: 2023-02-02

## 2023-02-02 LAB
CULTURE RESULTS: SIGNIFICANT CHANGE UP
SPECIMEN SOURCE: SIGNIFICANT CHANGE UP

## 2023-02-03 ENCOUNTER — EMERGENCY (EMERGENCY)
Facility: HOSPITAL | Age: 38
LOS: 0 days | Discharge: HOME | End: 2023-02-04
Attending: EMERGENCY MEDICINE | Admitting: EMERGENCY MEDICINE
Payer: MEDICAID

## 2023-02-03 ENCOUNTER — APPOINTMENT (OUTPATIENT)
Dept: PSYCHIATRY | Facility: CLINIC | Age: 38
End: 2023-02-03

## 2023-02-03 VITALS
RESPIRATION RATE: 18 BRPM | HEIGHT: 64 IN | TEMPERATURE: 98 F | HEART RATE: 105 BPM | DIASTOLIC BLOOD PRESSURE: 82 MMHG | WEIGHT: 139.99 LBS | OXYGEN SATURATION: 99 % | SYSTOLIC BLOOD PRESSURE: 148 MMHG

## 2023-02-03 DIAGNOSIS — Z79.82 LONG TERM (CURRENT) USE OF ASPIRIN: ICD-10-CM

## 2023-02-03 DIAGNOSIS — N39.0 URINARY TRACT INFECTION, SITE NOT SPECIFIED: ICD-10-CM

## 2023-02-03 DIAGNOSIS — K21.9 GASTRO-ESOPHAGEAL REFLUX DISEASE WITHOUT ESOPHAGITIS: ICD-10-CM

## 2023-02-03 DIAGNOSIS — Z79.4 LONG TERM (CURRENT) USE OF INSULIN: ICD-10-CM

## 2023-02-03 DIAGNOSIS — I10 ESSENTIAL (PRIMARY) HYPERTENSION: ICD-10-CM

## 2023-02-03 DIAGNOSIS — R07.9 CHEST PAIN, UNSPECIFIED: ICD-10-CM

## 2023-02-03 DIAGNOSIS — Z87.440 PERSONAL HISTORY OF URINARY (TRACT) INFECTIONS: ICD-10-CM

## 2023-02-03 DIAGNOSIS — R10.9 UNSPECIFIED ABDOMINAL PAIN: ICD-10-CM

## 2023-02-03 DIAGNOSIS — Z88.1 ALLERGY STATUS TO OTHER ANTIBIOTIC AGENTS STATUS: ICD-10-CM

## 2023-02-03 DIAGNOSIS — Z86.69 PERSONAL HISTORY OF OTHER DISEASES OF THE NERVOUS SYSTEM AND SENSE ORGANS: ICD-10-CM

## 2023-02-03 DIAGNOSIS — Z87.39 PERSONAL HISTORY OF OTHER DISEASES OF THE MUSCULOSKELETAL SYSTEM AND CONNECTIVE TISSUE: ICD-10-CM

## 2023-02-03 DIAGNOSIS — Z88.7 ALLERGY STATUS TO SERUM AND VACCINE: ICD-10-CM

## 2023-02-03 DIAGNOSIS — F32.9 MAJOR DEPRESSIVE DISORDER, SINGLE EPISODE, UNSPECIFIED: ICD-10-CM

## 2023-02-03 DIAGNOSIS — E10.9 TYPE 1 DIABETES MELLITUS WITHOUT COMPLICATIONS: ICD-10-CM

## 2023-02-03 DIAGNOSIS — Z88.0 ALLERGY STATUS TO PENICILLIN: ICD-10-CM

## 2023-02-03 DIAGNOSIS — Z88.8 ALLERGY STATUS TO OTHER DRUGS, MEDICAMENTS AND BIOLOGICAL SUBSTANCES STATUS: ICD-10-CM

## 2023-02-03 DIAGNOSIS — R11.2 NAUSEA WITH VOMITING, UNSPECIFIED: ICD-10-CM

## 2023-02-03 PROCEDURE — 99285 EMERGENCY DEPT VISIT HI MDM: CPT

## 2023-02-03 NOTE — DISCUSSION/SUMMARY
[FreeTextEntry1] : Patient's mother called to cancel patient's appointments for therapy and with psychiatrist for tomorrow because she is sick with a UTI. Left message with rescheduled appointment for 2/10 10 AM for therapy and 11 AM with psychiatrist.

## 2023-02-03 NOTE — ED ADULT TRIAGE NOTE - CHIEF COMPLAINT QUOTE
Pt BIBA from [facility] residence c/o of abd pain and vomiting. EMS states pt started with vomiting yesterday with diarrhea. PT also was just D/C'd here and diagnosed with a UTI. [id number] by EMS

## 2023-02-04 LAB
ALBUMIN SERPL ELPH-MCNC: 3.9 G/DL — SIGNIFICANT CHANGE UP (ref 3.5–5.2)
ALP SERPL-CCNC: 101 U/L — SIGNIFICANT CHANGE UP (ref 30–115)
ALT FLD-CCNC: 46 U/L — HIGH (ref 0–41)
ANION GAP SERPL CALC-SCNC: 11 MMOL/L — SIGNIFICANT CHANGE UP (ref 7–14)
APPEARANCE UR: CLEAR — SIGNIFICANT CHANGE UP
AST SERPL-CCNC: 28 U/L — SIGNIFICANT CHANGE UP (ref 0–41)
BACTERIA # UR AUTO: ABNORMAL
BASOPHILS # BLD AUTO: 0.02 K/UL — SIGNIFICANT CHANGE UP (ref 0–0.2)
BASOPHILS NFR BLD AUTO: 0.2 % — SIGNIFICANT CHANGE UP (ref 0–1)
BILIRUB SERPL-MCNC: <0.2 MG/DL — SIGNIFICANT CHANGE UP (ref 0.2–1.2)
BILIRUB UR-MCNC: NEGATIVE — SIGNIFICANT CHANGE UP
BUN SERPL-MCNC: 11 MG/DL — SIGNIFICANT CHANGE UP (ref 10–20)
CALCIUM SERPL-MCNC: 8.8 MG/DL — SIGNIFICANT CHANGE UP (ref 8.4–10.5)
CHLORIDE SERPL-SCNC: 97 MMOL/L — LOW (ref 98–110)
CO2 SERPL-SCNC: 25 MMOL/L — SIGNIFICANT CHANGE UP (ref 17–32)
COLOR SPEC: YELLOW — SIGNIFICANT CHANGE UP
CREAT SERPL-MCNC: 0.6 MG/DL — LOW (ref 0.7–1.5)
DIFF PNL FLD: NEGATIVE — SIGNIFICANT CHANGE UP
EGFR: 118 ML/MIN/1.73M2 — SIGNIFICANT CHANGE UP
EOSINOPHIL # BLD AUTO: 0.02 K/UL — SIGNIFICANT CHANGE UP (ref 0–0.7)
EOSINOPHIL NFR BLD AUTO: 0.2 % — SIGNIFICANT CHANGE UP (ref 0–8)
EPI CELLS # UR: 9 /HPF — HIGH (ref 0–5)
GLUCOSE SERPL-MCNC: 247 MG/DL — HIGH (ref 70–99)
GLUCOSE UR QL: ABNORMAL
HCG SERPL QL: NEGATIVE — SIGNIFICANT CHANGE UP
HCT VFR BLD CALC: 36.6 % — LOW (ref 37–47)
HGB BLD-MCNC: 10.4 G/DL — LOW (ref 12–16)
HYALINE CASTS # UR AUTO: >145 /LPF — HIGH (ref 0–7)
IMM GRANULOCYTES NFR BLD AUTO: 0.6 % — HIGH (ref 0.1–0.3)
KETONES UR-MCNC: ABNORMAL
LACTATE SERPL-SCNC: 0.7 MMOL/L — SIGNIFICANT CHANGE UP (ref 0.7–2)
LEUKOCYTE ESTERASE UR-ACNC: ABNORMAL
LIDOCAIN IGE QN: 12 U/L — SIGNIFICANT CHANGE UP (ref 7–60)
LYMPHOCYTES # BLD AUTO: 1.97 K/UL — SIGNIFICANT CHANGE UP (ref 1.2–3.4)
LYMPHOCYTES # BLD AUTO: 20.7 % — SIGNIFICANT CHANGE UP (ref 20.5–51.1)
MCHC RBC-ENTMCNC: 21.2 PG — LOW (ref 27–31)
MCHC RBC-ENTMCNC: 28.4 G/DL — LOW (ref 32–37)
MCV RBC AUTO: 74.5 FL — LOW (ref 81–99)
MONOCYTES # BLD AUTO: 0.62 K/UL — HIGH (ref 0.1–0.6)
MONOCYTES NFR BLD AUTO: 6.5 % — SIGNIFICANT CHANGE UP (ref 1.7–9.3)
NEUTROPHILS # BLD AUTO: 6.83 K/UL — HIGH (ref 1.4–6.5)
NEUTROPHILS NFR BLD AUTO: 71.8 % — SIGNIFICANT CHANGE UP (ref 42.2–75.2)
NITRITE UR-MCNC: NEGATIVE — SIGNIFICANT CHANGE UP
NRBC # BLD: 0 /100 WBCS — SIGNIFICANT CHANGE UP (ref 0–0)
PH UR: 6 — SIGNIFICANT CHANGE UP (ref 5–8)
PLATELET # BLD AUTO: 506 K/UL — HIGH (ref 130–400)
POTASSIUM SERPL-MCNC: 4.4 MMOL/L — SIGNIFICANT CHANGE UP (ref 3.5–5)
POTASSIUM SERPL-SCNC: 4.4 MMOL/L — SIGNIFICANT CHANGE UP (ref 3.5–5)
PROT SERPL-MCNC: 7.3 G/DL — SIGNIFICANT CHANGE UP (ref 6–8)
PROT UR-MCNC: ABNORMAL
RBC # BLD: 4.91 M/UL — SIGNIFICANT CHANGE UP (ref 4.2–5.4)
RBC # FLD: 25 % — HIGH (ref 11.5–14.5)
RBC CASTS # UR COMP ASSIST: 1 /HPF — SIGNIFICANT CHANGE UP (ref 0–4)
SODIUM SERPL-SCNC: 133 MMOL/L — LOW (ref 135–146)
SP GR SPEC: >1.05 (ref 1.01–1.03)
TROPONIN T SERPL-MCNC: <0.01 NG/ML — SIGNIFICANT CHANGE UP
UROBILINOGEN FLD QL: SIGNIFICANT CHANGE UP
WBC # BLD: 9.52 K/UL — SIGNIFICANT CHANGE UP (ref 4.8–10.8)
WBC # FLD AUTO: 9.52 K/UL — SIGNIFICANT CHANGE UP (ref 4.8–10.8)
WBC UR QL: 50 /HPF — HIGH (ref 0–5)

## 2023-02-04 PROCEDURE — 71045 X-RAY EXAM CHEST 1 VIEW: CPT | Mod: 26

## 2023-02-04 PROCEDURE — 74177 CT ABD & PELVIS W/CONTRAST: CPT | Mod: 26,MA

## 2023-02-04 PROCEDURE — 93010 ELECTROCARDIOGRAM REPORT: CPT

## 2023-02-04 RX ORDER — SODIUM CHLORIDE 9 MG/ML
1000 INJECTION INTRAMUSCULAR; INTRAVENOUS; SUBCUTANEOUS ONCE
Refills: 0 | Status: COMPLETED | OUTPATIENT
Start: 2023-02-04 | End: 2023-02-04

## 2023-02-04 RX ORDER — ONDANSETRON 8 MG/1
4 TABLET, FILM COATED ORAL ONCE
Refills: 0 | Status: COMPLETED | OUTPATIENT
Start: 2023-02-04 | End: 2023-02-04

## 2023-02-04 RX ADMIN — ONDANSETRON 4 MILLIGRAM(S): 8 TABLET, FILM COATED ORAL at 01:31

## 2023-02-04 RX ADMIN — SODIUM CHLORIDE 1000 MILLILITER(S): 9 INJECTION INTRAMUSCULAR; INTRAVENOUS; SUBCUTANEOUS at 01:31

## 2023-02-04 RX ADMIN — ONDANSETRON 4 MILLIGRAM(S): 8 TABLET, FILM COATED ORAL at 07:09

## 2023-02-04 NOTE — ED PROVIDER NOTE - PROVIDER TOKENS
PROVIDER:[TOKEN:[[id number]],FOLLOWUP:[1-3 Days],ESTABLISHEDPATIENT:[T]],PROVIDER:[TOKEN:[[id number]],FOLLOWUP:[7-10 Days],ESTABLISHEDPATIENT:[T]]

## 2023-02-04 NOTE — ED PROVIDER NOTE - CARE PLAN
Principal Discharge DX:	Abdominal pain  Secondary Diagnosis:	Frequent UTI  Secondary Diagnosis:	Chest pain   1

## 2023-02-04 NOTE — ED PROVIDER NOTE - OBJECTIVE STATEMENT
37 yold female to Ed Pmhx Htn, Dm, depression, pseudoseizures presents to Ed with multiple complaints; pt c/o n/v, diffuse abdominal pain, chest pain x 4 days; pt seen here 4 days ago dx with Uti given macrobid(took 5 doses); pt with chronic abdominal pain - prior workups negative; sees Dr. Mejia(GI) no specific cause found; pt denies fever, chills, dysuria, frequency, urgency;

## 2023-02-04 NOTE — ED PROVIDER NOTE - ATTENDING APP SHARED VISIT CONTRIBUTION OF CARE
36 yo female with PMH DM, HTN, depression presents for evaluation of NBNB vomiting, abdominal discomfort and chest pain for several days. Pt seen several days ago and treated with Macrobid for UTI which she states she is taking. Denies any cough, diarrhea, urinary complaints, SOB, dizziness, or weakness. no exertional complaints. Prt reports she follows with Dr. Mejia for chronic abdominal discomfort.    VITAL SIGNS: noted  CONSTITUTIONAL: Well-developed; well-nourished; in no acute distress  HEAD: Normocephalic; atraumatic  EYES: PERRL, EOM intact; conjunctiva and sclera clear  ENT: No nasal discharge; airway clear. MMM  NECK: Supple; non tender.    CARD: S1, S2 normal; no murmurs, gallops, or rubs. Regular rate and rhythm  RESP: CTAB/L, no wheezes, rales or rhonchi  ABD: Normal bowel sounds; soft; non-distended; non-tender; no CVA tenderness  EXT: Normal ROM. No calf tenderness or edema. Distal pulses intact  NEURO: Alert, oriented. Grossly unremarkable. No focal deficits  SKIN: Skin exam is warm and dry, no acute rash  MS: No midline spinal tenderness

## 2023-02-04 NOTE — ED ADULT NURSE NOTE - CHIEF COMPLAINT QUOTE
Pt BIBA from [facility] residence c/o of abd pain and vomiting. EMS states pt started with vomiting yesterday with diarrhea. PT also was just D/C'd here and diagnosed with a UTI. FS-[id number] by EMS

## 2023-02-04 NOTE — ED PROVIDER NOTE - PATIENT PORTAL LINK FT
You can access the FollowMyHealth Patient Portal offered by [facility] by registering at the following website: [email or web address]. By joining [employer]’s FollowMyHealth portal, you will also be able to view your health information using other applications (apps) compatible with our system.

## 2023-02-04 NOTE — ED PROVIDER NOTE - CARE PROVIDERS DIRECT ADDRESSES
[email or web address]

## 2023-02-04 NOTE — ED PROVIDER NOTE - NSDCPRINTRESULTS_ED_ALL_ED
"  Occupational Therapy Daily Treatment Note      Date: 10/9/2018  Name: Constantino Mayorga  Clinic Number: [id number]     Medical Diagnosis: Crush injury to left hand without tendon, bone, or nerve involvement  Date of Onset: 08/17/2018  Date of Surgery: NA  Surgical Procedure: NA  Therapy Diagnosis:        Encounter Diagnoses   Name Primary?    Pain in left hand      Stiffness of finger joint of left hand        Precautions: Standard     Physician: Jason De La Torre Jr., *  Physician Orders: eval and treat  Date of Return to MD: 10/10/2018     Evaluation Date: 10/4/2018  Plan of Care Certification Period: 10/04/2018-11/15/2018     Visit #:/ Visits authorized: 2/12  Insurance Authorization period Expiration: 11/30/2018    Time In:1000AM  Time Out: 1055AM  Total Billable Time: 55 minutes  Charges for this Visit: FL, BONILLA x 3    Subjective     Pt reports: " I was able to open a water bottle- I surprised myself"   she was compliant with home exercise program given last session.   Response to previous treatment:difficulty with finger extension  Functional change: opened water bottle    Pain: 0/10  Location: left     Objective     Observation/Appearance: well healed incision to dorsal hand just proximal to MC heads     Edema. Measured in centimeters.    10/4/2018 10/4/2018     Right Left   2in. Above elbow       2in. Below elbow       Wrist Crease       Figure of 8       MCPs 21.0 21.0            Hand ROM. Measured in degrees.    10/4/2018 10/4/2018     Right Left *           Index: MP  80 75              PIP     104 95              DIP 65 62              ABBOTT 249 232           Long:  MP 85 80               99              DIP 75 64              ABBOTT 265 243           Ring:   MP 85 75               112              DIP 74 47              ABBOTT 264 234           Small:  MP 95 80               PIP 90 100               DIP 76 65              ABBOTT 261 245               Strength (Dynamometer) and Pinch Strength (Pinch " Gauge)  Measured in pounds.    10/4/2018 10/4/2018     Right Left   Rung II 70 21   Key Pinch 15 8   3pt Pinch 17 6   2pt Pinch 15 5      Sensation: intact per pt report                 CMS Impairment/Limitation/Restriction for FOTO Survey  Therapist reviewed FOTO scores for Constantino Mayorga.  FOTO documents entered into [facility] - see Media section.     Intake Limitation Score: 38% - 10/4/2018             Constantino received the following supervised modalities after being cleared for contradictions for 15 minutes:   -Fluidotherapy: To left hand, continuous air, 110 deg, air speed 100 to decrease pain, edema & scar tissue and increased tissue extensibility.      Constantino received the following direct contact modalities after being cleared for contraindications for 0 minutes:  -none today     Constantino received the following manual therapy techniques for 3 minutes:   -retrograde and scar massage     Constantino received therapeutic exercises for 37 minutes including:  -  Composite wrist flex with fist (elbow straight)     Prayer stretch 30 sec holds x 3 reps        30 sec holds x 3 reps   AROM   Wave, Hook, Straight Fist, Comp Fist, Lifts, ABD/ADD    X 10 reps each    Isospheres X 3 min    [place]ach Medium resistance putty X 3 min molding  X 3 min squeezing  X 5 pancake/blooms  X 3 logs key pinch  X 3 logs 3pt pinch   Red hand X  X 2/10 extension    velcro board Key pinch simulation x 10 passes             Home Exercises and Education Provided     Education provided:   - Progress towards goals     Written Home Exercises Provided: Patient instructed to cont prior HEP.  Exercises were reviewed and Constantino was able to demonstrate them prior to the end of the session.  Constantino demonstrated good  understanding of the education provided.   .   See EMR under Patient Instructions for exercises provided prior visit.     Assessment     Constantino aMyorga is a 61 y.o. female referred to outpatient occupational/hand therapy and presents with a medical  diagnosis of left hand laceration sans tendon, bone, or nerve involvement. She is Almost 2 months post injury. Pt. Able to complete HEP independently. Added light resistive strengthening with slight fatigue reported but no other complaints. Pt. Sees MD tomorrow.       Constantino is progressing well towards her goals and there are no updates to goals at this time. Pt prognosis continues as Excellent. Pt will continue to benefit from skilled outpatient occupational therapy to address the deficits listed in the problem list on initial evaluation, provide pt/family education and to maximize pt's level of independence in the home and community environment.     Anticipated barriers to continued occupational therapy: none    Pt's spiritual, cultural and educational needs considered and pt agreeable to plan of care and goals.    Goals     The following goals were discussed with the patient and patient is in agreement with them as to be addressed in the treatment plan.   Long Term Goals (LTGs); to be met by discharge.  LTG #1: Pt will report return to work full duty without pain or difficulty            LTG #2: Pt will demo improved FOTO score by 15 points.   LTG #3: Pt will return to prior level of function for ADLs and household management.      Short Term Goals (STGs); to be met within 4 weeks (11/05/2018).  STG #1a: Pt will demo ability to lift 25# box from floor to waist level without pain or difficulty in order to return to full duty at work safely.   STG #2a: Pt will report/demo [place] with opening containers and turning deadbolts.  STG #3a: Pt will demonstrate independence with issued HEP.   STG #3b: Pt will demo improved  strength of left hand by 20# needed to aid with work occupations.       Plan     Continue skilled occupational therapy with individualized plan of care 2x/week for 6 weeks from 10/04/2018 to 11/15/2018.    Discussed Plan of Care with patient: Yes  Updates/Grading for next session: cont to  progress as able    Alejandra Dong, OTANTONIO, OTR/L, CHT   Occupational therapist, Certified Hand Therapist              Patient requests all Lab, Cardiology, and Radiology Results on their Discharge Instructions

## 2023-02-04 NOTE — ED PROVIDER NOTE - CARE PROVIDER_API CALL
Davidson Navas)  Internal Medicine  [facility] [place] [address]  Phone: [phone]  Fax: [phone]  Established Patient  Follow Up Time: 1-3 Days    Leonardo Mejia)  Gastroenterology; Internal Medicine  [address] [place] [address]  Phone: [phone]  Fax: [phone]  Established Patient  Follow Up Time: 7-10 Days

## 2023-02-04 NOTE — ED PROVIDER NOTE - CLINICAL SUMMARY MEDICAL DECISION MAKING FREE TEXT BOX
pt evaluated for vomiting and abdominal discomfort, labs reviewed, EKG nonischemic, pt sx improved in ED. Pt reported feeling ok for dc, advised close follow up with PMD and GI for reevaluation and further workup and pt agreed. Strict return precautions advised and parent verbalized understanding. pt evaluated for vomiting and abdominal discomfort, labs reviewed, EKG nonischemic, pt sx improved in ED. Imaging negative for acute pathology. Pt reported feeling ok for dc, advised close follow up with PMD and GI for reevaluation and further workup and pt agreed. Strict return precautions advised and parent verbalized understanding.

## 2023-02-04 NOTE — ED PROVIDER NOTE - PHYSICAL EXAMINATION
Constitutional: Well developed, well nourished. NAD  Head: Normocephalic, atraumatic.  Eyes: PERRL, EOMI.  ENT: No nasal discharge. Mucous membranes dry.  Neck: Supple. Painless ROM.  Cardiovascular:  Regular rate and    Pulmonary: Lungs clear to auscultation bilaterally.    Abdominal: Soft. Nondistended. No rebound, guarding, rigidity.  Extremities. Pelvis stable. No lower extremity edema, symmetric calves.  Skin: No rashes, cyanosis.  Neuro: AAOx3. No focal neurological deficits.  Psych: Normal mood. Normal affect.

## 2023-02-05 LAB
CULTURE RESULTS: NO GROWTH — SIGNIFICANT CHANGE UP
SPECIMEN SOURCE: SIGNIFICANT CHANGE UP

## 2023-02-08 NOTE — ED PROVIDER NOTE - NECK [-], MLM
Acquired hypothyroidism with hx of MNG s/p total thyroidectomy 1/25/17  Patient takes synthroid 88 mcg daily. TSH 4 months ago within normal limits, on admission significantly elevated to 30.6 concerning for inadequate dosage. Patient is also taking famotidine.    Plan:  - Continue levothyroxine dosage to 100 mcg and recheck in 4-6 weeks. TSH may be falsely elevated in setting of infection     no neck mass

## 2023-02-09 ENCOUNTER — APPOINTMENT (OUTPATIENT)
Dept: OBGYN | Facility: CLINIC | Age: 38
End: 2023-02-09
Payer: MEDICAID

## 2023-02-09 VITALS — WEIGHT: 187 LBS | SYSTOLIC BLOOD PRESSURE: 118 MMHG | DIASTOLIC BLOOD PRESSURE: 68 MMHG | BODY MASS INDEX: 32.1 KG/M2

## 2023-02-09 DIAGNOSIS — Z87.42 PERSONAL HISTORY OF OTHER DISEASES OF THE FEMALE GENITAL TRACT: ICD-10-CM

## 2023-02-09 DIAGNOSIS — N39.0 URINARY TRACT INFECTION, SITE NOT SPECIFIED: ICD-10-CM

## 2023-02-09 DIAGNOSIS — N76.2 ACUTE VULVITIS: ICD-10-CM

## 2023-02-09 DIAGNOSIS — N39.3 STRESS INCONTINENCE (FEMALE) (MALE): ICD-10-CM

## 2023-02-09 DIAGNOSIS — L29.2 PRURITUS VULVAE: ICD-10-CM

## 2023-02-09 LAB
GLUCOSE UR-MCNC: NORMAL
HGB UR QL STRIP.AUTO: NORMAL
KETONES UR-MCNC: NORMAL
LEUKOCYTE ESTERASE UR QL STRIP: NORMAL
PROT UR STRIP-MCNC: NORMAL

## 2023-02-09 PROCEDURE — 99214 OFFICE O/P EST MOD 30 MIN: CPT

## 2023-02-09 PROCEDURE — 81002 URINALYSIS NONAUTO W/O SCOPE: CPT

## 2023-02-09 NOTE — DISCUSSION/SUMMARY
[FreeTextEntry1] : Issues regarding recurrent urinary tract infections discussed with patient and her mother\par Issues regarding urinary stress incontinence discussed with patient and her mother\par Advised consultation with urogynecologist\par B VV test done\par Advised use of Aquaphor to external genitalia as a skin protectant\par Prescribed Lotrisone cream as directed

## 2023-02-09 NOTE — REASON FOR VISIT
[Follow-Up] : a follow-up evaluation of [Urinary Complaints] : urinary complaints [Vulvar/Vaginal Complaint] : vulvar/vaginal complaint [Parent] : parent

## 2023-02-09 NOTE — HISTORY OF PRESENT ILLNESS
[FreeTextEntry1] : Patient is 37 years old para 0-0-0-0 last menstrual period January 17, 2023\par She is status post evaluation in the emergency department noted to have a urinary tract infection and treated with Macrobid x7 days.  Presently she notes resolution of urinary symptoms and urine dip is noted to be within normal limits.  Patient states that since having COVID in October 2022 she has had a persistent cough and notes urinary stress incontinence upon coughing.  She also complains of vaginal discharge and vulvar irritation/inflammation

## 2023-02-09 NOTE — PHYSICAL EXAM
[Appropriately responsive] : appropriately responsive [Alert] : alert [No Acute Distress] : no acute distress [No Lymphadenopathy] : no lymphadenopathy [Regular Rate Rhythm] : regular rate rhythm [No Murmurs] : no murmurs [Clear to Auscultation B/L] : clear to auscultation bilaterally [Soft] : soft [Non-tender] : non-tender [Non-distended] : non-distended [No HSM] : No HSM [No Lesions] : no lesions [No Mass] : no mass [Oriented x3] : oriented x3 [Normal] : normal [Discharge] : a  ~M vaginal discharge was present

## 2023-02-10 ENCOUNTER — APPOINTMENT (OUTPATIENT)
Dept: PSYCHIATRY | Facility: CLINIC | Age: 38
End: 2023-02-10

## 2023-02-10 ENCOUNTER — OUTPATIENT (OUTPATIENT)
Dept: OUTPATIENT SERVICES | Facility: HOSPITAL | Age: 38
LOS: 1 days | End: 2023-02-10
Payer: MEDICAID

## 2023-02-10 DIAGNOSIS — Z00.8 ENCOUNTER FOR OTHER GENERAL EXAMINATION: ICD-10-CM

## 2023-02-10 PROCEDURE — 99213 OFFICE O/P EST LOW 20 MIN: CPT | Mod: 25

## 2023-02-10 PROCEDURE — 90832 PSYTX W PT 30 MINUTES: CPT

## 2023-02-10 NOTE — PHYSICAL EXAM
[Cooperative] : cooperative [Euthymic] : euthymic [Full] : full [Clear] : clear [Linear/Goal Directed] : linear/goal directed [WNL] : within normal limits [Average] : average [de-identified] : Fair [de-identified] : Fair

## 2023-02-10 NOTE — SOCIAL HISTORY
[Assisted Living Facility] : lives in an assisted living facility [Disabled] : disabled [Never ] : never  [High School] : high school [None] : none [FreeTextEntry1] : [facility] for the past 2.5yrs [No Known Substance Use] : no known substance use [Yes] : yes [No Known Use] : no known use [TextBox_52] : Prescribed

## 2023-02-10 NOTE — PLAN
[FreeTextEntry2] : Goal #1 " I want to feel better, and need help managing my anxiety and stress" \par \par Objective #1 Revised..  Patient will learn assertive communication and be able to set boundaries and limits.  \par Objective #2 Continued... Patient will learn and utilize positive coping, stress management, and CBT/DBT methods. \par  \par  [[facility]] : [facility]  [Supportive Therapy] : Supportive Therapy [de-identified] : Patient reports that she is feeling a little better physically. She reports that she had to go to the ER last week due to UTI and stomach virus. SHe has been on antibiotics.She will be going for a biopsy of nodule in her lung. She expressed anxiety and feelings of grief. She reports use of positive grieving methods such as watching shows and listening to music that father liked. She will also wear some of father's clothes in his honor since he didn't get to wear them. Suggested bereavement group but patient is unable able to connect virtually. She continues to express some anxiety and thoughts that trigger or worsen anxiety. She reports that she had a Pseudoseizure while at physical therapy. She reports that her body is not used to exercises or movement instructed at PT.  She was able to recognize how anxiety and negative or catastrophizing thoughts also worsen taxiway and trigger Pseudoseizures. Redirected patient to practice thought restructuring methods. Also encouraged her to use mindfulness, being more aware of her body tension, and use PT or other stretching exercises at home. Spoke about conflict with male friend at her residence. She reports that she has been setting limits with him and having time for herself or with mother. She reports that he is now in treatment for his mental illness and anger so she hopes that there will be less conflict with him.  [Recommended Frequency of Visits: ____] : Recommended frequency of visits: [unfilled] [Return in ____ week(s)] : Return in [unfilled] week(s) [FreeTextEntry1] : Patient will focus on positive coping, grieving, and CBT methods. She will followup with medical needs, continue Physical Therapy, continue medication regimen,followup with psychiatrist, and followup with therapy on 2/24.

## 2023-02-10 NOTE — HISTORY OF PRESENT ILLNESS
[FreeTextEntry1] : \par  [FreeTextEntry3] : celexa\par cymbalta (not effective)\par zyprexa\par buspar (not effective)\par gabapentin (hives)\par melatonin  [No] : no [de-identified] : Ms. Ania Mei is a 36 year old single  female of [place] descent, and she resides in [facility] an adult assisted living facility for “a little bit over a year” with her parents after they became homeless 2019. She reports a medical history relevant for diabetes, diabetic neuropathy, back pain, migraine headaches, hand & leg tremors, tachycardia, GERD, & vulvodynia, and she has a psychiatric history relevant for anxiety disorder & bipolar disorder as well as 4 IPP admissions for suicidal ideations & attempts about 20 years ago. She reported that she was last in therapy “4 or 5 years ago” in this clinic program.  \par \par Ms. Mei reported that she initially diagnosed with bipolar disorder "right after 911", that was when she first experienced suicidal ideations, and had her first IPP admission. She reported being in treatment at [facility] until she turned 18 years old and did not engage in treatment again until she was "31 or 32 years old. She reported that she attended this clinic, had 2 sessions and never returned to treatment.\par \par  She reports that she has had a great deal of stressors, she and her parents became homeless "just before the pandemic", they lived between hotels and their car, her father became severe medically ill (cancer on his nose, liver issues, kidney issues, gall bladder issues, nodule on his lung), after her father had surgery the [facility] was able to facilitate a place for the family and they have resided in [facility]  since 10/2020. With all these stressors in her life she noticed her anxiety started to spike, she also witnessed several residents die, her aunt  from COVID.\par \par She reports being anxious constantly where she experiences dizziness, light handedness, chest pains, heart palpitations, and stomach pains “and they have told me I’m having anxiety & panic attacks”. She reported that she saw the psychiatrist at her residence and she prescribed Buspar & melatonin but she “did not feel right on it” and she stopped taking the medications. \par

## 2023-02-10 NOTE — DISCUSSION/SUMMARY
[FreeTextEntry1] : Ms. Ania Mei is a 36yo single  female.   Ania reported severe severe stressors sine beginning of 2020 , including homelessness , changes in living situation, stress over the covid pandemic with losses within the family including his father on 11/28.  Ania reported increased of anxiety with episodes of falling, generalized shakiness, head pressure and inability to move, patients EEG and video EEG were reportedly negative for any seizure activity \par \par No suicidal or homicidal thoughts. No overt psychosis or alvina on exam. Patient has appropriate protective factors in place. Is engaged in his treatment. No acute safety concerns. Outpatient level of care remains appropriate.\par Risks, benefits, and alteratives of treatment options reviewed as well as importance of compliance with chosen options. Patient demonstrated an understanding of above and is amenable with plan.\par \par \par decrease klonpin 0.5mg po qhs to 0.25mg po daily w/ plans to taper off \par Escitalopram 20mg po daily\par RTC 2 weeks

## 2023-02-13 ENCOUNTER — APPOINTMENT (OUTPATIENT)
Dept: GASTROENTEROLOGY | Facility: CLINIC | Age: 38
End: 2023-02-13
Payer: MEDICAID

## 2023-02-13 VITALS
BODY MASS INDEX: 31.76 KG/M2 | HEART RATE: 95 BPM | SYSTOLIC BLOOD PRESSURE: 127 MMHG | DIASTOLIC BLOOD PRESSURE: 91 MMHG | WEIGHT: 186 LBS | OXYGEN SATURATION: 98 % | HEIGHT: 64 IN

## 2023-02-13 DIAGNOSIS — R11.0 NAUSEA: ICD-10-CM

## 2023-02-13 DIAGNOSIS — N76.0 ACUTE VAGINITIS: ICD-10-CM

## 2023-02-13 DIAGNOSIS — B96.89 ACUTE VAGINITIS: ICD-10-CM

## 2023-02-13 DIAGNOSIS — R11.10 VOMITING, UNSPECIFIED: ICD-10-CM

## 2023-02-13 PROCEDURE — 99214 OFFICE O/P EST MOD 30 MIN: CPT

## 2023-02-13 RX ORDER — INSULIN LISPRO 100 U/ML
100 INJECTION, SOLUTION INTRAVENOUS; SUBCUTANEOUS DAILY
Qty: 30 | Refills: 11 | Status: DISCONTINUED | COMMUNITY
Start: 2020-08-19 | End: 2023-02-13

## 2023-02-22 ENCOUNTER — APPOINTMENT (OUTPATIENT)
Dept: PULMONOLOGY | Facility: CLINIC | Age: 38
End: 2023-02-22
Payer: MEDICAID

## 2023-02-22 ENCOUNTER — APPOINTMENT (OUTPATIENT)
Dept: PULMONOLOGY | Facility: CLINIC | Age: 38
End: 2023-02-22

## 2023-02-22 VITALS
WEIGHT: 186 LBS | BODY MASS INDEX: 31.76 KG/M2 | DIASTOLIC BLOOD PRESSURE: 78 MMHG | SYSTOLIC BLOOD PRESSURE: 124 MMHG | OXYGEN SATURATION: 98 % | HEIGHT: 64 IN | HEART RATE: 74 BPM

## 2023-02-22 DIAGNOSIS — T78.40XA ALLERGY, UNSPECIFIED, INITIAL ENCOUNTER: ICD-10-CM

## 2023-02-22 DIAGNOSIS — F41.1 GENERALIZED ANXIETY DISORDER: ICD-10-CM

## 2023-02-22 DIAGNOSIS — K21.9 GASTRO-ESOPHAGEAL REFLUX DISEASE W/OUT ESOPHAGITIS: ICD-10-CM

## 2023-02-22 DIAGNOSIS — R93.2 ABNORMAL FINDINGS ON DIAGNOSTIC IMAGING OF LIVER AND BILIARY TRACT: ICD-10-CM

## 2023-02-22 PROCEDURE — 99215 OFFICE O/P EST HI 40 MIN: CPT

## 2023-02-22 PROCEDURE — 99205 OFFICE O/P NEW HI 60 MIN: CPT

## 2023-02-23 ENCOUNTER — APPOINTMENT (OUTPATIENT)
Dept: GASTROENTEROLOGY | Facility: CLINIC | Age: 38
End: 2023-02-23
Payer: MEDICAID

## 2023-02-23 VITALS
WEIGHT: 186 LBS | BODY MASS INDEX: 31.76 KG/M2 | SYSTOLIC BLOOD PRESSURE: 140 MMHG | DIASTOLIC BLOOD PRESSURE: 87 MMHG | HEART RATE: 101 BPM | OXYGEN SATURATION: 99 % | HEIGHT: 64 IN

## 2023-02-23 VITALS
HEIGHT: 64 IN | SYSTOLIC BLOOD PRESSURE: 140 MMHG | OXYGEN SATURATION: 99 % | HEART RATE: 101 BPM | BODY MASS INDEX: 31.76 KG/M2 | WEIGHT: 186 LBS | DIASTOLIC BLOOD PRESSURE: 87 MMHG

## 2023-02-23 DIAGNOSIS — K75.81 NONALCOHOLIC STEATOHEPATITIS (NASH): ICD-10-CM

## 2023-02-23 PROCEDURE — 99417 PROLNG OP E/M EACH 15 MIN: CPT

## 2023-02-23 PROCEDURE — 99215 OFFICE O/P EST HI 40 MIN: CPT

## 2023-02-23 PROCEDURE — 91200 LIVER ELASTOGRAPHY: CPT

## 2023-02-23 RX ORDER — HYDROXYZINE HYDROCHLORIDE 10 MG/1
10 TABLET ORAL
Qty: 60 | Refills: 0 | Status: DISCONTINUED | COMMUNITY
End: 2023-02-23

## 2023-02-23 RX ORDER — AZITHROMYCIN 250 MG/1
250 TABLET, FILM COATED ORAL
Qty: 1 | Refills: 0 | Status: DISCONTINUED | COMMUNITY
Start: 2022-11-23 | End: 2023-02-23

## 2023-02-24 ENCOUNTER — OUTPATIENT (OUTPATIENT)
Dept: OUTPATIENT SERVICES | Facility: HOSPITAL | Age: 38
LOS: 1 days | End: 2023-02-24
Payer: MEDICAID

## 2023-02-24 ENCOUNTER — APPOINTMENT (OUTPATIENT)
Dept: PSYCHIATRY | Facility: CLINIC | Age: 38
End: 2023-02-24

## 2023-02-24 DIAGNOSIS — F41.0 PANIC DISORDER [EPISODIC PAROXYSMAL ANXIETY]: ICD-10-CM

## 2023-02-24 DIAGNOSIS — F41.1 GENERALIZED ANXIETY DISORDER: ICD-10-CM

## 2023-02-24 PROBLEM — K75.81 NASH (NONALCOHOLIC STEATOHEPATITIS): Status: ACTIVE | Noted: 2023-02-24

## 2023-02-24 PROCEDURE — 90832 PSYTX W PT 30 MINUTES: CPT

## 2023-02-24 PROCEDURE — 99213 OFFICE O/P EST LOW 20 MIN: CPT | Mod: 25

## 2023-02-24 NOTE — SOCIAL HISTORY
[Assisted Living Facility] : lives in an assisted living facility [Disabled] : disabled [Never ] : never  [High School] : high school [None] : none [FreeTextEntry1] : [facility] for the past 2.5yrs [No Known Substance Use] : no known substance use [Yes] : yes [No Known Use] : no known use [TextBox_52] : Prescribed

## 2023-02-24 NOTE — PHYSICAL EXAM
[General Appearance - Alert] : alert [Sclera] : the sclera and conjunctiva were normal [Outer Ear] : the ears and nose were normal in appearance [Neck Appearance] : the appearance of the neck was normal [Respiration, Rhythm And Depth] : normal respiratory rhythm and effort [Auscultation Breath Sounds / Voice Sounds] : lungs were clear to auscultation bilaterally [Heart Sounds] : normal S1 and S2 [Murmurs] : no murmurs [Edema] : there was no peripheral edema [Abdomen Soft] : soft [Abdomen Tenderness] : non-tender [Nail Clubbing] : no clubbing  or cyanosis of the fingernails [] : no rash [No Focal Deficits] : no focal deficits [Oriented To Time, Place, And Person] : oriented to person, place, and time [Scleral Icterus] : No Scleral Icterus [Spider Angioma] : No spider angioma(s) were observed [Abdominal  Ascites] : no ascites [Asterixis] : no asterixis observed [Jaundice] : No jaundice [FreeTextEntry1] : obese BMI 31

## 2023-02-24 NOTE — HISTORY OF PRESENT ILLNESS
[de-identified] : Has had intermittent elevation of liver enzymes intermittently since 2018. Had significant elevation of liver tests in Dec 2021 with    while admitted for abdominal pain and then returned to baseline. Admitted with chest pain due to COVID in Dec 2022 with elevation of liver tests. \par Labs in Jan 2023 AST 28 aLT 46 . Fatty liver on imaging since Nov 2022. \par No hx of jaundice confusion hematemesis or melena\par Reports upper abdominal pain for past 2 years. Pain radiates to back and sides. Crampy pain. Initially on and off and but now constant all day. No relation to food or BM or position. No specific increasing or reducing factors. No nausea vomiting or diarrhea or constipation. Reports lots of gas. Has heart burn acid reflux. \par No dysphagia or odynophagia. \par Hx of T1DM on insulin from first month \par Awaiting disability. Previously in retail. Lives with mother in assisted living.\par Adopted \par No alcohol smoking or drug use.

## 2023-02-24 NOTE — DISCUSSION/SUMMARY
[Plan Review] : Plan Review [Adherent to treatment recommendations] : adherent to treatment recommendations [Articulate] : articulate [Cognitively intact] : cognitively intact [Motivated to participate in treatment] : motivated to participate in treatment [Has vocational interests or hobbies] : has vocational interests or hobbies [Part of a supportive family] : part of a supportive family [Has a supportive network] : has a supportive network [Housing stability] : housing stability [English fluency] : English fluency [Connected to healthcare] : connected to healthcare [Social supports] : social supports [Mental Health] : Mental Health [Interpersonal Relationships] : Interpersonal Relationships [Revised - Rationale] : Revised - Rationale: [every ___ months] : every [unfilled] months [every ___ weeks] : every [unfilled] weeks [Improvement in symptoms as evidenced by:] : Improvement in symptoms as evidenced by: [None - Reason others did not participate:] : None - Reason others did not participate:  [Yes] : Yes [Psychiatric Provider/Prescriber] : Psychiatric Provider/Prescriber [Therapist] : Therapist [de-identified] : P [FreeTextEntry2] : 11/7/23 [FreeTextEntry3] : 3/1/22 [FreeTextEntry8] : None [FreeTextEntry9] : None  [de-identified] : None  [Physical Health] : Physical Health [Continued - Progress made] : Continued - Progress made: [Initial] : Initial [de-identified] : 11/7/23 [de-identified] : Patient continues to be reactive related to stress, conflict, and her emotions. She is dependent but has started to set limits with boyfriend so she can try to spend time alone and with father who is very ill.  [de-identified] : 11/7/23 [FreeTextEntry1] : Patient has not been attending to her Diabetic diet and overall health or self-care.  [FreeTextEntry4] : " I want to be healthier"  [de-identified] : Patient continues to have frequent ER visits due to medical issues and anxiety worsening somatic symptoms.  [de-identified] : Patient will followup with all medical appointments and procedures.  [de-identified] : Patient will comply with medical recommendations or treatment including diet.  [de-identified] : Patient was not been complaint with Diabetic diet.  [FreeTextEntry5] : Supportive and [place]  Individual therapy  [de-identified] : Symptoms have significantly reduced, patient can function independently, and continue medication regimen with PCP or other provider.  [de-identified] : Not clinically indicated

## 2023-02-24 NOTE — PHYSICAL EXAM
[FreeTextEntry2] : uses a walker  [Cooperative] : cooperative [Euthymic] : euthymic [Full] : full [Clear] : clear [Linear/Goal Directed] : linear/goal directed [WNL] : within normal limits [Average] : average [de-identified] : Fair [de-identified] : Fair

## 2023-02-24 NOTE — DISCUSSION/SUMMARY
[Plan Review] : Plan Review [Adherent to treatment recommendations] : adherent to treatment recommendations [Articulate] : articulate [Cognitively intact] : cognitively intact [Motivated to participate in treatment] : motivated to participate in treatment [Has vocational interests or hobbies] : has vocational interests or hobbies [Part of a supportive family] : part of a supportive family [Has a supportive network] : has a supportive network [Housing stability] : housing stability [English fluency] : English fluency [Connected to healthcare] : connected to healthcare [Social supports] : social supports [Mental Health] : Mental Health [Interpersonal Relationships] : Interpersonal Relationships [Revised - Rationale] : Revised - Rationale: [every ___ months] : every [unfilled] months [every ___ weeks] : every [unfilled] weeks [Improvement in symptoms as evidenced by:] : Improvement in symptoms as evidenced by: [None - Reason others did not participate:] : None - Reason others did not participate:  [Yes] : Yes [Psychiatric Provider/Prescriber] : Psychiatric Provider/Prescriber [Therapist] : Therapist [de-identified] : P [FreeTextEntry2] : 11/7/23 [FreeTextEntry3] : 3/1/22 [FreeTextEntry8] : None [FreeTextEntry9] : None  [de-identified] : None  [Physical Health] : Physical Health [Continued - Progress made] : Continued - Progress made: [Initial] : Initial [de-identified] : 11/7/23 [de-identified] : Patient continues to be reactive related to stress, conflict, and her emotions. She is dependent but has started to set limits with boyfriend so she can try to spend time alone and with father who is very ill.  [de-identified] : 11/7/23 [FreeTextEntry1] : Patient has not been attending to her Diabetic diet and overall health or self-care.  [FreeTextEntry4] : " I want to be healthier"  [de-identified] : Patient continues to have frequent ER visits due to medical issues and anxiety worsening somatic symptoms.  [de-identified] : Patient will followup with all medical appointments and procedures.  [de-identified] : Patient will comply with medical recommendations or treatment including diet.  [de-identified] : Patient was not been complaint with Diabetic diet.  [FreeTextEntry5] : Supportive and [place]  Individual therapy  [de-identified] : Symptoms have significantly reduced, patient can function independently, and continue medication regimen with PCP or other provider.  [de-identified] : Not clinically indicated

## 2023-02-24 NOTE — DISCUSSION/SUMMARY
[FreeTextEntry1] : Ms. Ania Mei is a 36yo single  female.   Ania reported severe severe stressors sine beginning of 2020 , including homelessness , changes in living situation, stress over the covid pandemic with losses within the family including his father on 11/28.  Ania reported increased of anxiety with episodes of falling, generalized shakiness, head pressure and inability to move, patients EEG and video EEG were reportedly negative for any seizure activity \par \par Pt is tolerating Klonopin taper, no acute exacerbation of anxiety sxs. No suicidal or homicidal thoughts. No overt psychosis or alvina on exam. Patient has appropriate protective factors in place. Is engaged in his treatment. No acute safety concerns. Outpatient level of care remains appropriate.\par Risks, benefits, and alteratives of treatment options reviewed as well as importance of compliance with chosen options. Patient demonstrated an understanding of above and is amenable with plan.\par \par continue klonopin 0.25mg x 8 8days then stop \par Escitalopram 20mg po daily\par nutritional counseling provided \par RTC 3 weeks

## 2023-02-24 NOTE — ADDENDUM
[FreeTextEntry1] : This review is late due to patient cancelling sessions since she was sick with COVID.

## 2023-02-24 NOTE — HISTORY OF PRESENT ILLNESS
[FreeTextEntry1] : \par  [FreeTextEntry3] : celexa\par cymbalta (not effective)\par zyprexa\par buspar (not effective)\par gabapentin (hives)\par melatonin  [No] : no [de-identified] : Ms. Ania Mei is a 36 year old single  female of [place] descent, and she resides in [facility] an adult assisted living facility for “a little bit over a year” with her parents after they became homeless 2019. She reports a medical history relevant for diabetes, diabetic neuropathy, back pain, migraine headaches, hand & leg tremors, tachycardia, GERD, & vulvodynia, and she has a psychiatric history relevant for anxiety disorder & bipolar disorder as well as 4 IPP admissions for suicidal ideations & attempts about 20 years ago. She reported that she was last in therapy “4 or 5 years ago” in this clinic program.  \par \par Ms. Mei reported that she initially diagnosed with bipolar disorder "right after 911", that was when she first experienced suicidal ideations, and had her first IPP admission. She reported being in treatment at [facility] until she turned 18 years old and did not engage in treatment again until she was "31 or 32 years old. She reported that she attended this clinic, had 2 sessions and never returned to treatment.\par \par  She reports that she has had a great deal of stressors, she and her parents became homeless "just before the pandemic", they lived between hotels and their car, her father became severe medically ill (cancer on his nose, liver issues, kidney issues, gall bladder issues, nodule on his lung), after her father had surgery the [facility] was able to facilitate a place for the family and they have resided in [facility]  since 10/2020. With all these stressors in her life she noticed her anxiety started to spike, she also witnessed several residents die, her aunt  from COVID.\par \par She reports being anxious constantly where she experiences dizziness, light handedness, chest pains, heart palpitations, and stomach pains “and they have told me I’m having anxiety & panic attacks”. She reported that she saw the psychiatrist at her residence and she prescribed Buspar & melatonin but she “did not feel right on it” and she stopped taking the medications. \par

## 2023-02-24 NOTE — ASSESSMENT
[FreeTextEntry1] : 37 y o  F with type 1 DM obesity seen for fatty liver with elevated liver tests\par \par JOEL\par Patient has elevated liver tests for several years. Mild elevation of ALT only recently\par Has Met S. US shows fatty liver\par SETH AMA hepatitis B C negative\par Fibroscan shows  S1 steatosis LS 7.3 F0-F1\par Multiple US abdomen CT abdomen and also had MRI abdomen with no lesion reported\par Patient is on insulin therapy. Would defer to endocrinology if can use GLP1 agonist in type 1 DM for wt loss.\par With T2DM would avoid Vitamin E as well\par Advised wt loss of 10 % - diet and  if possible exercise. Limited by neuropathy\par \par Abdominal pain\par Chronic non specific pain. No alarm symptoms  and no concerns on imaging  ESR 20 \par Patient appears to have anxiety. May be IBS\par Seeing GI. \par \par Health maintenance \par Will check immune status for hepatitis A and B.

## 2023-02-24 NOTE — REASON FOR VISIT
[Patient] : Patient [FreeTextEntry1] : Patient is a 37 year old female seen for a followup therapy session, 11:30 AM to 12:00 PM.

## 2023-02-24 NOTE — REVIEW OF SYSTEMS
[As Noted in HPI] : as noted in HPI [Emotional Problems] : emotional problems [Fever] : no fever [Chills] : no chills [Eye Pain] : no eye pain [Red Eyes] : eyes not red [Earache] : no earache [Loss Of Hearing] : no hearing loss [Chest Pain] : no chest pain [Palpitations] : no palpitations [Lower Ext Edema] : no lower extremity edema [Cough] : no cough [SOB on Exertion] : no shortness of breath during exertion [Skin Lesions] : no skin lesions [Confused] : no confusion [Easy Bleeding] : no tendency for easy bleeding [Easy Bruising] : no tendency for easy bruising
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
## 2023-02-24 NOTE — HISTORY OF PRESENT ILLNESS
[de-identified] : Has had intermittent elevation of liver enzymes intermittently since 2018. Had significant elevation of liver tests in Dec 2021 with    while admitted for abdominal pain and then returned to baseline. Admitted with chest pain due to COVID in Dec 2022 with elevation of liver tests. \par Labs in Jan 2023 AST 28 aLT 46 . Fatty liver on imaging since Nov 2022. \par No hx of jaundice confusion hematemesis or melena\par Reports upper abdominal pain for past 2 years. Pain radiates to back and sides. Crampy pain. Initially on and off and but now constant all day. No relation to food or BM or position. No specific increasing or reducing factors. No nausea vomiting or diarrhea or constipation. Reports lots of gas. Has heart burn acid reflux. \par No dysphagia or odynophagia. \par Hx of T1DM on insulin from first month \par Awaiting disability. Previously in retail. Lives with mother in assisted living.\par Adopted \par No alcohol smoking or drug use.

## 2023-02-26 ENCOUNTER — EMERGENCY (EMERGENCY)
Facility: HOSPITAL | Age: 38
LOS: 0 days | Discharge: HOME | End: 2023-02-27
Attending: STUDENT IN AN ORGANIZED HEALTH CARE EDUCATION/TRAINING PROGRAM
Payer: MEDICAID

## 2023-02-26 VITALS
TEMPERATURE: 96 F | DIASTOLIC BLOOD PRESSURE: 76 MMHG | HEART RATE: 106 BPM | HEIGHT: 64 IN | SYSTOLIC BLOOD PRESSURE: 146 MMHG | RESPIRATION RATE: 18 BRPM | OXYGEN SATURATION: 98 %

## 2023-02-26 DIAGNOSIS — Z79.82 LONG TERM (CURRENT) USE OF ASPIRIN: ICD-10-CM

## 2023-02-26 DIAGNOSIS — E10.9 TYPE 1 DIABETES MELLITUS WITHOUT COMPLICATIONS: ICD-10-CM

## 2023-02-26 DIAGNOSIS — Z87.39 PERSONAL HISTORY OF OTHER DISEASES OF THE MUSCULOSKELETAL SYSTEM AND CONNECTIVE TISSUE: ICD-10-CM

## 2023-02-26 DIAGNOSIS — G62.9 POLYNEUROPATHY, UNSPECIFIED: ICD-10-CM

## 2023-02-26 DIAGNOSIS — Z88.7 ALLERGY STATUS TO SERUM AND VACCINE: ICD-10-CM

## 2023-02-26 DIAGNOSIS — Z88.0 ALLERGY STATUS TO PENICILLIN: ICD-10-CM

## 2023-02-26 DIAGNOSIS — R07.89 OTHER CHEST PAIN: ICD-10-CM

## 2023-02-26 DIAGNOSIS — Z88.8 ALLERGY STATUS TO OTHER DRUGS, MEDICAMENTS AND BIOLOGICAL SUBSTANCES: ICD-10-CM

## 2023-02-26 DIAGNOSIS — Z79.4 LONG TERM (CURRENT) USE OF INSULIN: ICD-10-CM

## 2023-02-26 DIAGNOSIS — Z88.1 ALLERGY STATUS TO OTHER ANTIBIOTIC AGENTS STATUS: ICD-10-CM

## 2023-02-26 DIAGNOSIS — F32.9 MAJOR DEPRESSIVE DISORDER, SINGLE EPISODE, UNSPECIFIED: ICD-10-CM

## 2023-02-26 LAB
ALBUMIN SERPL ELPH-MCNC: 4.1 G/DL — SIGNIFICANT CHANGE UP (ref 3.5–5.2)
ALP SERPL-CCNC: 136 U/L — HIGH (ref 30–115)
ALT FLD-CCNC: 31 U/L — SIGNIFICANT CHANGE UP (ref 0–41)
ANION GAP SERPL CALC-SCNC: 9 MMOL/L — SIGNIFICANT CHANGE UP (ref 7–14)
AST SERPL-CCNC: 19 U/L — SIGNIFICANT CHANGE UP (ref 0–41)
BASE EXCESS BLDV CALC-SCNC: 3.9 MMOL/L — HIGH (ref -2–3)
BASOPHILS # BLD AUTO: 0.06 K/UL — SIGNIFICANT CHANGE UP (ref 0–0.2)
BASOPHILS NFR BLD AUTO: 0.6 % — SIGNIFICANT CHANGE UP (ref 0–1)
BILIRUB SERPL-MCNC: <0.2 MG/DL — SIGNIFICANT CHANGE UP (ref 0.2–1.2)
BUN SERPL-MCNC: 11 MG/DL — SIGNIFICANT CHANGE UP (ref 10–20)
CA-I SERPL-SCNC: 1.21 MMOL/L — SIGNIFICANT CHANGE UP (ref 1.15–1.33)
CALCIUM SERPL-MCNC: 9.4 MG/DL — SIGNIFICANT CHANGE UP (ref 8.4–10.5)
CHLORIDE SERPL-SCNC: 98 MMOL/L — SIGNIFICANT CHANGE UP (ref 98–110)
CO2 SERPL-SCNC: 27 MMOL/L — SIGNIFICANT CHANGE UP (ref 17–32)
CREAT SERPL-MCNC: 0.7 MG/DL — SIGNIFICANT CHANGE UP (ref 0.7–1.5)
EGFR: 114 ML/MIN/1.73M2 — SIGNIFICANT CHANGE UP
EOSINOPHIL # BLD AUTO: 0.32 K/UL — SIGNIFICANT CHANGE UP (ref 0–0.7)
EOSINOPHIL NFR BLD AUTO: 3.4 % — SIGNIFICANT CHANGE UP (ref 0–8)
GAS PNL BLDV: 131 MMOL/L — LOW (ref 136–145)
GAS PNL BLDV: SIGNIFICANT CHANGE UP
GAS PNL BLDV: SIGNIFICANT CHANGE UP
GLUCOSE SERPL-MCNC: 173 MG/DL — HIGH (ref 70–99)
HCO3 BLDV-SCNC: 29 MMOL/L — SIGNIFICANT CHANGE UP (ref 22–29)
HCT VFR BLD CALC: 38.5 % — SIGNIFICANT CHANGE UP (ref 37–47)
HCT VFR BLDA CALC: 36 % — LOW (ref 39–51)
HGB BLD CALC-MCNC: 11.9 G/DL — LOW (ref 12.6–17.4)
HGB BLD-MCNC: 11.4 G/DL — LOW (ref 12–16)
IMM GRANULOCYTES NFR BLD AUTO: 0.6 % — HIGH (ref 0.1–0.3)
LACTATE BLDV-MCNC: 1.2 MMOL/L — SIGNIFICANT CHANGE UP (ref 0.5–2)
LYMPHOCYTES # BLD AUTO: 3.48 K/UL — HIGH (ref 1.2–3.4)
LYMPHOCYTES # BLD AUTO: 36.7 % — SIGNIFICANT CHANGE UP (ref 20.5–51.1)
MCHC RBC-ENTMCNC: 22.3 PG — LOW (ref 27–31)
MCHC RBC-ENTMCNC: 29.6 G/DL — LOW (ref 32–37)
MCV RBC AUTO: 75.3 FL — LOW (ref 81–99)
MONOCYTES # BLD AUTO: 0.75 K/UL — HIGH (ref 0.1–0.6)
MONOCYTES NFR BLD AUTO: 7.9 % — SIGNIFICANT CHANGE UP (ref 1.7–9.3)
NEUTROPHILS # BLD AUTO: 4.82 K/UL — SIGNIFICANT CHANGE UP (ref 1.4–6.5)
NEUTROPHILS NFR BLD AUTO: 50.8 % — SIGNIFICANT CHANGE UP (ref 42.2–75.2)
NRBC # BLD: 0 /100 WBCS — SIGNIFICANT CHANGE UP (ref 0–0)
PCO2 BLDV: 46 MMHG — HIGH (ref 39–42)
PH BLDV: 7.41 — SIGNIFICANT CHANGE UP (ref 7.32–7.43)
PLATELET # BLD AUTO: 508 K/UL — HIGH (ref 130–400)
PO2 BLDV: 55 MMHG — SIGNIFICANT CHANGE UP
POTASSIUM BLDV-SCNC: 4.1 MMOL/L — SIGNIFICANT CHANGE UP (ref 3.5–5.1)
POTASSIUM SERPL-MCNC: 4.5 MMOL/L — SIGNIFICANT CHANGE UP (ref 3.5–5)
POTASSIUM SERPL-SCNC: 4.5 MMOL/L — SIGNIFICANT CHANGE UP (ref 3.5–5)
PROT SERPL-MCNC: 7.8 G/DL — SIGNIFICANT CHANGE UP (ref 6–8)
RBC # BLD: 5.11 M/UL — SIGNIFICANT CHANGE UP (ref 4.2–5.4)
RBC # FLD: 24.9 % — HIGH (ref 11.5–14.5)
SAO2 % BLDV: 85.5 % — SIGNIFICANT CHANGE UP
SODIUM SERPL-SCNC: 134 MMOL/L — LOW (ref 135–146)
TROPONIN T SERPL-MCNC: <0.01 NG/ML — SIGNIFICANT CHANGE UP
WBC # BLD: 9.49 K/UL — SIGNIFICANT CHANGE UP (ref 4.8–10.8)
WBC # FLD AUTO: 9.49 K/UL — SIGNIFICANT CHANGE UP (ref 4.8–10.8)

## 2023-02-26 PROCEDURE — 80053 COMPREHEN METABOLIC PANEL: CPT

## 2023-02-26 PROCEDURE — 84295 ASSAY OF SERUM SODIUM: CPT

## 2023-02-26 PROCEDURE — 99285 EMERGENCY DEPT VISIT HI MDM: CPT

## 2023-02-26 PROCEDURE — 93005 ELECTROCARDIOGRAM TRACING: CPT

## 2023-02-26 PROCEDURE — 82962 GLUCOSE BLOOD TEST: CPT

## 2023-02-26 PROCEDURE — 99285 EMERGENCY DEPT VISIT HI MDM: CPT | Mod: 25

## 2023-02-26 PROCEDURE — 36415 COLL VENOUS BLD VENIPUNCTURE: CPT

## 2023-02-26 PROCEDURE — 82803 BLOOD GASES ANY COMBINATION: CPT

## 2023-02-26 PROCEDURE — 85025 COMPLETE CBC W/AUTO DIFF WBC: CPT

## 2023-02-26 PROCEDURE — 84132 ASSAY OF SERUM POTASSIUM: CPT

## 2023-02-26 PROCEDURE — 85018 HEMOGLOBIN: CPT

## 2023-02-26 PROCEDURE — 71046 X-RAY EXAM CHEST 2 VIEWS: CPT | Mod: 26

## 2023-02-26 PROCEDURE — 82330 ASSAY OF CALCIUM: CPT

## 2023-02-26 PROCEDURE — 85014 HEMATOCRIT: CPT

## 2023-02-26 PROCEDURE — 84484 ASSAY OF TROPONIN QUANT: CPT

## 2023-02-26 PROCEDURE — 83605 ASSAY OF LACTIC ACID: CPT

## 2023-02-26 PROCEDURE — 71046 X-RAY EXAM CHEST 2 VIEWS: CPT

## 2023-02-26 RX ORDER — ACETAMINOPHEN 500 MG
975 TABLET ORAL ONCE
Refills: 0 | Status: COMPLETED | OUTPATIENT
Start: 2023-02-26 | End: 2023-02-26

## 2023-02-26 RX ADMIN — Medication 975 MILLIGRAM(S): at 22:12

## 2023-02-27 PROCEDURE — 93010 ELECTROCARDIOGRAM REPORT: CPT

## 2023-02-27 NOTE — ED PROVIDER NOTE - NSFOLLOWUPINSTRUCTIONS_ED_ALL_ED_FT
Noncardiac Chest Pain    WHAT YOU NEED TO KNOW:    Noncardiac chest pain is pain or discomfort in your chest not caused by a heart problem. Possible causes include acid reflux, nerve or muscle problems, emotions, or chest wall or rib pain.    DISCHARGE INSTRUCTIONS:    Return to the emergency department if:   •You have severe chest pain.    Call your doctor if:   •Your chest pain does not get better, even with treatment.  •You have questions or concerns about your condition or care.    Medicines:   •Medicines may be given to treat the cause of your chest pain. You may be given medicines to decrease pain, relieve anxiety, decrease acid reflux, or relax muscles in your esophagus.  •Take your medicine as directed. Contact your healthcare provider if you think your medicine is not helping or if you have side effects. Tell him of her if you are allergic to any medicine. Keep a list of the medicines, vitamins, and herbs you take. Include the amounts, and when and why you take them. Bring the list or the pill bottles to follow-up visits. Carry your medicine list with you in case of an emergency.    Cognitive therapy: Cognitive therapy may be helpful if you have panic attacks, anxiety, or depression. It can help you change how you react to situations that tend to trigger your chest pain.      Healthy living tips: The following are general healthy guidelines. If the cause of your chest pain is known, your healthcare provider will give you specific guidelines to follow.  •Do not smoke. Nicotine and other chemicals in cigarettes and cigars can cause lung and heart damage. Ask your healthcare provider for information if you currently smoke and need help to quit. E-cigarettes or smokeless tobacco still contain nicotine. Talk to your healthcare provider before you use these products.  •Choose a variety of healthy foods as often as possible. Include fresh, frozen, or canned fruits and vegetables. Also include low-fat dairy products, fish, chicken (without skin), and lean meats. Your healthcare provider or a dietitian can help you create meal plans. You may need to avoid certain foods or drinks if your pain is caused by a digestion problem.  Healthy Foods  •Lower your sodium (salt) intake. Limit foods that are high in sodium, such as canned foods, salty snacks, and cold cuts. If you add salt when you cook food, do not add more at the table. Choose low-sodium canned foods as much as possible.  •Ask about activity. Your healthcare provider will tell you which activities to limit or avoid. Ask when you can drive, return to work, and have sex. Ask about the best exercise plan for you.  •Maintain a healthy weight. Ask your healthcare provider what a healthy weight is for you. Ask him or her to help you create a weight loss plan if you are overweight.  •Ask about vaccines you may need. Get the influenza (flu) vaccine every year as soon as recommended, usually in September or October. You may also need a pneumococcal vaccine to prevent pneumonia. The vaccine is usually given every 5 years, starting at age 65. Your healthcare provider can tell you if should get other vaccines, and when to get them.    Follow up with your doctor as directed: Write down your questions so you remember to ask them during your visits.

## 2023-02-27 NOTE — ED PROVIDER NOTE - PATIENT PORTAL LINK FT
You can access the FollowMyHealth Patient Portal offered by [facility] by registering at the following website: [email or web address]. By joining [facility] [employer]’s FollowMyHealth portal, you will also be able to view your health information using other applications (apps) compatible with our system.

## 2023-02-27 NOTE — ED PROVIDER NOTE - CLINICAL SUMMARY MEDICAL DECISION MAKING FREE TEXT BOX
37-year-old female presenting today with left-sided chest pain.  Patient is hemodynamically stable and well-appearing on evaluation.  Patient's EKG was grossly unremarkable for cardiac ischemic changes as interpreted by myself.  Patient's x-ray was also unremarkable.  Patient's labs were indicative of no leukocytosis, normal troponin.  Patient at this time will be discharged to close follow-up with PMD.  Patient had resolution of her symptomatology on reevaluation.  Patient discharged with return precautions explained to her.

## 2023-02-27 NOTE — ED PROVIDER NOTE - PHYSICAL EXAMINATION
CONSTITUTIONAL: Well-appearing; well-nourished; in no apparent distress.   EYES: PERRL; EOM intact.   CARDIOVASCULAR: Normal S1, S2; no murmurs, rubs, or gallops.   RESPIRATORY: Normal chest excursion with respiration; breath sounds clear and equal bilaterally; no wheezes, rhonchi, or rales.  GI/: Normal bowel sounds; non-distended; non-tender; no palpable organomegaly.   MS: Reproducible chest wall tenderness.  No crepitus to chest wall.  SKIN: Normal for age and race; warm; dry; good turgor; no apparent lesions or exudate.   NEURO/PSYCH: A & O x 4; grossly unremarkable.

## 2023-02-27 NOTE — ED PROVIDER NOTE - OBJECTIVE STATEMENT
37 years old female history of diabetes, MDD, neuropathy present complaint left-sided chest pain for about 1 day.  Denies fall or injury.  Chest pain exacerbated with movement.  Denies shortness of breath, diaphoresis and weakness associated chest pain.  Denies exogenous hormone use/recent hospitalization/hx of DVT. denies recent illness/fever/chill/HA/dizziness/sob/abd pain/n/v/d/urinary sxs.

## 2023-03-01 ENCOUNTER — APPOINTMENT (OUTPATIENT)
Dept: INTERNAL MEDICINE | Facility: CLINIC | Age: 38
End: 2023-03-01

## 2023-03-06 ENCOUNTER — RX RENEWAL (OUTPATIENT)
Age: 38
End: 2023-03-06

## 2023-03-08 NOTE — REVIEW OF SYSTEMS
[Feeling Tired] : feeling tired [As Noted in HPI] : as noted in HPI [Abdominal Pain] : abdominal pain [Heartburn] : heartburn [Negative] : Heme/Lymph [Fever] : no fever [Chills] : no chills [Feeling Poorly] : not feeling poorly [Recent Weight Gain (___ Lbs)] : no recent weight gain [Recent Weight Loss (___ Lbs)] : no recent weight loss [Vomiting] : no vomiting [Constipation] : no constipation [Diarrhea] : no diarrhea [Melena (black stool)] : no melena [Bleeding] : no bleeding [Fecal Incontinence (soiling)] : no fecal incontinence [Bloating (gassiness)] : no bloating

## 2023-03-08 NOTE — PHYSICAL EXAM
[Alert] : alert [Normal Voice/Communication] : normal voice/communication [No Acute Distress] : no acute distress [Obese (BMI >= 30)] : obese (BMI >= 30) [Sclera] : the sclera and conjunctiva were normal [None] : no edema [Normal] : normal bowel sounds, non-tender, no masses, soft, no no hepato-splenomegaly [Normal Color / Pigmentation] : normal skin color and pigmentation [Oriented To Time, Place, And Person] : oriented to person, place, and time [de-identified] : Walker

## 2023-03-08 NOTE — ASSESSMENT
[FreeTextEntry1] : 37 yr old female with H/O GERD ,Anemia, and abdominal pain here for F/U. She was hospitalized 11/30/22 for abdominal pain; CT Scan of Abdomen showed Hepatic Steatosis without any other GI abnormalities. She was found to have severe Anemia (Hgb 7.8) with  low Iron and she was given an Iron infusion. It was recommended that she have an EGD and colonoscopy done as an outpatient.  \par \par Abdominal pain\par \par - Will schedule an EGD when her DM is well controlled (BS < or equal to 200)\par - CT Scan of Abdomen reviewed from 12/22 and 2/4/23 and showed Hepatic Steatosis without any other intra-abdominal pathology\par - Try Simethicone 1/2 hr before meals tid\par \par Anemia\par \par - CBCs from 12/22 and 2/4/23 reviewed\par - Patient needs an EGD and Colonoscopy once her BS are 200 or less; this was discussed with patient and her mother today\par - Continue Ferrous Sulfate\par - F/U with Hematology\par \par Hepatic Steatosis\par \par - CMP from 2/4/23 reviewed; ALT minimally increased at 46 and the rest of her LFTs were normal\par - CT Scan of Abdomen from 12/22 and 2/4/23 and US from 12/22 were reviewed and showed hepatis steatosis without any biliary duct dilation or hepatic lesions\par - F/U with Hepatologist, Dr. Munoz (has appt later this month)\par - MRI of Abdomen was recommended to be done as an outpatient \par \par H/O GERD\par \par - Symptoms are well controlled with PPI\par - Continue PPI OD\par \par \par \par \par \par

## 2023-03-08 NOTE — HISTORY OF PRESENT ILLNESS
[FreeTextEntry1] : 37 yr old female with H/O GERD ,Anemia, and abdominal pain here for F/U. She was hospitalized 11/30/22 for abdominal pain; CT Scan of Abdomen showed Hepatic Steatosis without any other GI abnormalities. She was found to have severe Anemia (Hgb 7.8) with  low Iron and she was given an Iron infusion. It was recommended that she have an EGD and colonoscopy done as an outpatient.  She went to the ED again on 2/4/23 for abdominal pain; CT Scan of Abdomen was done and showed Hepatic Steatosis without any acute intra-abdominal pathology. Her Hgb had improved to 10.4; she has been on Ferrous Sulfate 325 mg OD since January. She still C/O constant, abdominal pain across upper abdomen, described as crampy and sometimes radiating laterally. She stated that she stopped the Dicyclomine because it made the abdominal pain worse. She denied recent vomiting, hematemesis, dysphagia, constipation, diarrhea, melena, weight loss, jaundice, or blood in the stool. She never had an EGd and colonoscopy due to the poor control of her IDDM. Blood sugars were again reviewed today and have still gone as high as 400 recently. She has a H/O heartburn that is well controlled by Omeprazole OD. She was adopted so her family history is unknown.  [de-identified] : 2/4/2023

## 2023-03-09 ENCOUNTER — OUTPATIENT (OUTPATIENT)
Dept: OUTPATIENT SERVICES | Facility: HOSPITAL | Age: 38
LOS: 1 days | End: 2023-03-09
Payer: MEDICAID

## 2023-03-09 ENCOUNTER — APPOINTMENT (OUTPATIENT)
Dept: OPHTHALMOLOGY | Facility: CLINIC | Age: 38
End: 2023-03-09
Payer: COMMERCIAL

## 2023-03-09 DIAGNOSIS — H53.8 OTHER VISUAL DISTURBANCES: ICD-10-CM

## 2023-03-09 PROCEDURE — 92012 INTRM OPH EXAM EST PATIENT: CPT

## 2023-03-10 ENCOUNTER — OUTPATIENT (OUTPATIENT)
Dept: OUTPATIENT SERVICES | Facility: HOSPITAL | Age: 38
LOS: 1 days | End: 2023-03-10
Payer: MEDICAID

## 2023-03-10 ENCOUNTER — APPOINTMENT (OUTPATIENT)
Dept: PSYCHIATRY | Facility: CLINIC | Age: 38
End: 2023-03-10

## 2023-03-10 DIAGNOSIS — F41.0 PANIC DISORDER [EPISODIC PAROXYSMAL ANXIETY]: ICD-10-CM

## 2023-03-10 DIAGNOSIS — H35.373 PUCKERING OF MACULA, BILATERAL: ICD-10-CM

## 2023-03-10 DIAGNOSIS — E11.9 TYPE 2 DIABETES MELLITUS WITHOUT COMPLICATIONS: ICD-10-CM

## 2023-03-10 DIAGNOSIS — F41.1 GENERALIZED ANXIETY DISORDER: ICD-10-CM

## 2023-03-10 PROCEDURE — 90832 PSYTX W PT 30 MINUTES: CPT

## 2023-03-10 NOTE — REASON FOR VISIT
[Patient] : Patient [FreeTextEntry1] : Patient is a 37 year old female seen for a followup therapy session, 10:45 AM to 11:15 AM. COVID screening is negative.
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
## 2023-03-10 NOTE — END OF VISIT
[Individual Psychotherapy for 16-37 minutes] : Individual Psychotherapy for 16-37 minutes [Duration of Psychotherapy Visit (minutes spent in synchronous communication): ____] : Duration of Psychotherapy Visit (minutes spent in synchronous communication): [unfilled]

## 2023-03-11 DIAGNOSIS — F41.0 PANIC DISORDER [EPISODIC PAROXYSMAL ANXIETY]: ICD-10-CM

## 2023-03-11 DIAGNOSIS — F41.1 GENERALIZED ANXIETY DISORDER: ICD-10-CM

## 2023-03-14 ENCOUNTER — EMERGENCY (EMERGENCY)
Facility: HOSPITAL | Age: 38
LOS: 0 days | Discharge: HOME | End: 2023-03-14
Attending: STUDENT IN AN ORGANIZED HEALTH CARE EDUCATION/TRAINING PROGRAM
Payer: MEDICAID

## 2023-03-14 VITALS
HEIGHT: 64 IN | OXYGEN SATURATION: 99 % | WEIGHT: 186.07 LBS | SYSTOLIC BLOOD PRESSURE: 136 MMHG | HEART RATE: 98 BPM | DIASTOLIC BLOOD PRESSURE: 78 MMHG | RESPIRATION RATE: 20 BRPM | TEMPERATURE: 99 F

## 2023-03-14 DIAGNOSIS — E10.40 TYPE 1 DIABETES MELLITUS WITH DIABETIC NEUROPATHY, UNSPECIFIED: ICD-10-CM

## 2023-03-14 DIAGNOSIS — E10.65 TYPE 1 DIABETES MELLITUS WITH HYPERGLYCEMIA: ICD-10-CM

## 2023-03-14 DIAGNOSIS — R07.89 OTHER CHEST PAIN: ICD-10-CM

## 2023-03-14 DIAGNOSIS — Z88.7 ALLERGY STATUS TO SERUM AND VACCINE: ICD-10-CM

## 2023-03-14 DIAGNOSIS — R10.9 UNSPECIFIED ABDOMINAL PAIN: ICD-10-CM

## 2023-03-14 DIAGNOSIS — Z79.4 LONG TERM (CURRENT) USE OF INSULIN: ICD-10-CM

## 2023-03-14 DIAGNOSIS — R11.2 NAUSEA WITH VOMITING, UNSPECIFIED: ICD-10-CM

## 2023-03-14 DIAGNOSIS — Z86.69 PERSONAL HISTORY OF OTHER DISEASES OF THE NERVOUS SYSTEM AND SENSE ORGANS: ICD-10-CM

## 2023-03-14 DIAGNOSIS — Z87.440 PERSONAL HISTORY OF URINARY (TRACT) INFECTIONS: ICD-10-CM

## 2023-03-14 DIAGNOSIS — Z87.39 PERSONAL HISTORY OF OTHER DISEASES OF THE MUSCULOSKELETAL SYSTEM AND CONNECTIVE TISSUE: ICD-10-CM

## 2023-03-14 DIAGNOSIS — Z79.82 LONG TERM (CURRENT) USE OF ASPIRIN: ICD-10-CM

## 2023-03-14 DIAGNOSIS — Z88.8 ALLERGY STATUS TO OTHER DRUGS, MEDICAMENTS AND BIOLOGICAL SUBSTANCES STATUS: ICD-10-CM

## 2023-03-14 DIAGNOSIS — K21.9 GASTRO-ESOPHAGEAL REFLUX DISEASE WITHOUT ESOPHAGITIS: ICD-10-CM

## 2023-03-14 DIAGNOSIS — Z20.822 CONTACT WITH AND (SUSPECTED) EXPOSURE TO COVID-19: ICD-10-CM

## 2023-03-14 DIAGNOSIS — Z88.1 ALLERGY STATUS TO OTHER ANTIBIOTIC AGENTS STATUS: ICD-10-CM

## 2023-03-14 DIAGNOSIS — R42 DIZZINESS AND GIDDINESS: ICD-10-CM

## 2023-03-14 DIAGNOSIS — F32.9 MAJOR DEPRESSIVE DISORDER, SINGLE EPISODE, UNSPECIFIED: ICD-10-CM

## 2023-03-14 DIAGNOSIS — H92.01 OTALGIA, RIGHT EAR: ICD-10-CM

## 2023-03-14 DIAGNOSIS — I10 ESSENTIAL (PRIMARY) HYPERTENSION: ICD-10-CM

## 2023-03-14 DIAGNOSIS — Z88.0 ALLERGY STATUS TO PENICILLIN: ICD-10-CM

## 2023-03-14 LAB
ALBUMIN SERPL ELPH-MCNC: 4.2 G/DL — SIGNIFICANT CHANGE UP (ref 3.5–5.2)
ALP SERPL-CCNC: 166 U/L — HIGH (ref 30–115)
ALT FLD-CCNC: 53 U/L — HIGH (ref 0–41)
ANION GAP SERPL CALC-SCNC: 9 MMOL/L — SIGNIFICANT CHANGE UP (ref 7–14)
APPEARANCE UR: CLEAR — SIGNIFICANT CHANGE UP
AST SERPL-CCNC: 20 U/L — SIGNIFICANT CHANGE UP (ref 0–41)
B-OH-BUTYR SERPL-SCNC: <0.2 MMOL/L — SIGNIFICANT CHANGE UP
BASE EXCESS BLDV CALC-SCNC: 2.6 MMOL/L — SIGNIFICANT CHANGE UP (ref -2–3)
BASOPHILS # BLD AUTO: 0.07 K/UL — SIGNIFICANT CHANGE UP (ref 0–0.2)
BASOPHILS NFR BLD AUTO: 0.7 % — SIGNIFICANT CHANGE UP (ref 0–1)
BILIRUB SERPL-MCNC: <0.2 MG/DL — SIGNIFICANT CHANGE UP (ref 0.2–1.2)
BILIRUB UR-MCNC: NEGATIVE — SIGNIFICANT CHANGE UP
BUN SERPL-MCNC: 10 MG/DL — SIGNIFICANT CHANGE UP (ref 10–20)
CA-I SERPL-SCNC: 1.24 MMOL/L — SIGNIFICANT CHANGE UP (ref 1.15–1.33)
CALCIUM SERPL-MCNC: 9.1 MG/DL — SIGNIFICANT CHANGE UP (ref 8.4–10.5)
CHLORIDE SERPL-SCNC: 96 MMOL/L — LOW (ref 98–110)
CO2 SERPL-SCNC: 25 MMOL/L — SIGNIFICANT CHANGE UP (ref 17–32)
COLOR SPEC: SIGNIFICANT CHANGE UP
CREAT SERPL-MCNC: 0.6 MG/DL — LOW (ref 0.7–1.5)
DIFF PNL FLD: NEGATIVE — SIGNIFICANT CHANGE UP
EGFR: 118 ML/MIN/1.73M2 — SIGNIFICANT CHANGE UP
EOSINOPHIL # BLD AUTO: 0.09 K/UL — SIGNIFICANT CHANGE UP (ref 0–0.7)
EOSINOPHIL NFR BLD AUTO: 0.9 % — SIGNIFICANT CHANGE UP (ref 0–8)
FLUAV AG NPH QL: SIGNIFICANT CHANGE UP
FLUBV AG NPH QL: SIGNIFICANT CHANGE UP
GAS PNL BLDV: 131 MMOL/L — LOW (ref 136–145)
GAS PNL BLDV: SIGNIFICANT CHANGE UP
GAS PNL BLDV: SIGNIFICANT CHANGE UP
GLUCOSE SERPL-MCNC: 367 MG/DL — HIGH (ref 70–99)
GLUCOSE UR QL: ABNORMAL
HCG SERPL QL: NEGATIVE — SIGNIFICANT CHANGE UP
HCO3 BLDV-SCNC: 28 MMOL/L — SIGNIFICANT CHANGE UP (ref 22–29)
HCT VFR BLD CALC: 39.3 % — SIGNIFICANT CHANGE UP (ref 37–47)
HCT VFR BLDA CALC: 37 % — LOW (ref 39–51)
HGB BLD CALC-MCNC: 12.4 G/DL — LOW (ref 12.6–17.4)
HGB BLD-MCNC: 12.1 G/DL — SIGNIFICANT CHANGE UP (ref 12–16)
IMM GRANULOCYTES NFR BLD AUTO: 0.8 % — HIGH (ref 0.1–0.3)
KETONES UR-MCNC: NEGATIVE — SIGNIFICANT CHANGE UP
LACTATE BLDV-MCNC: 1.1 MMOL/L — SIGNIFICANT CHANGE UP (ref 0.5–2)
LEUKOCYTE ESTERASE UR-ACNC: NEGATIVE — SIGNIFICANT CHANGE UP
LIDOCAIN IGE QN: 14 U/L — SIGNIFICANT CHANGE UP (ref 7–60)
LYMPHOCYTES # BLD AUTO: 2.2 K/UL — SIGNIFICANT CHANGE UP (ref 1.2–3.4)
LYMPHOCYTES # BLD AUTO: 21.7 % — SIGNIFICANT CHANGE UP (ref 20.5–51.1)
MAGNESIUM SERPL-MCNC: 1.8 MG/DL — SIGNIFICANT CHANGE UP (ref 1.8–2.4)
MCHC RBC-ENTMCNC: 24.5 PG — LOW (ref 27–31)
MCHC RBC-ENTMCNC: 30.8 G/DL — LOW (ref 32–37)
MCV RBC AUTO: 79.6 FL — LOW (ref 81–99)
MONOCYTES # BLD AUTO: 0.73 K/UL — HIGH (ref 0.1–0.6)
MONOCYTES NFR BLD AUTO: 7.2 % — SIGNIFICANT CHANGE UP (ref 1.7–9.3)
NEUTROPHILS # BLD AUTO: 6.98 K/UL — HIGH (ref 1.4–6.5)
NEUTROPHILS NFR BLD AUTO: 68.7 % — SIGNIFICANT CHANGE UP (ref 42.2–75.2)
NITRITE UR-MCNC: NEGATIVE — SIGNIFICANT CHANGE UP
NRBC # BLD: 0 /100 WBCS — SIGNIFICANT CHANGE UP (ref 0–0)
PCO2 BLDV: 48 MMHG — HIGH (ref 39–42)
PH BLDV: 7.38 — SIGNIFICANT CHANGE UP (ref 7.32–7.43)
PH UR: 6 — SIGNIFICANT CHANGE UP (ref 5–8)
PHOSPHATE SERPL-MCNC: 3.1 MG/DL — SIGNIFICANT CHANGE UP (ref 2.1–4.9)
PLATELET # BLD AUTO: 453 K/UL — HIGH (ref 130–400)
PO2 BLDV: 49 MMHG — SIGNIFICANT CHANGE UP
POTASSIUM BLDV-SCNC: 4.4 MMOL/L — SIGNIFICANT CHANGE UP (ref 3.5–5.1)
POTASSIUM SERPL-MCNC: 4.6 MMOL/L — SIGNIFICANT CHANGE UP (ref 3.5–5)
POTASSIUM SERPL-SCNC: 4.6 MMOL/L — SIGNIFICANT CHANGE UP (ref 3.5–5)
PROT SERPL-MCNC: 7.6 G/DL — SIGNIFICANT CHANGE UP (ref 6–8)
PROT UR-MCNC: SIGNIFICANT CHANGE UP
RBC # BLD: 4.94 M/UL — SIGNIFICANT CHANGE UP (ref 4.2–5.4)
RBC # FLD: 25 % — HIGH (ref 11.5–14.5)
RSV RNA NPH QL NAA+NON-PROBE: SIGNIFICANT CHANGE UP
SAO2 % BLDV: 80.9 % — SIGNIFICANT CHANGE UP
SARS-COV-2 RNA SPEC QL NAA+PROBE: SIGNIFICANT CHANGE UP
SODIUM SERPL-SCNC: 130 MMOL/L — LOW (ref 135–146)
SP GR SPEC: 1.04 — HIGH (ref 1.01–1.03)
TROPONIN T SERPL-MCNC: <0.01 NG/ML — SIGNIFICANT CHANGE UP
UROBILINOGEN FLD QL: SIGNIFICANT CHANGE UP
WBC # BLD: 10.15 K/UL — SIGNIFICANT CHANGE UP (ref 4.8–10.8)
WBC # FLD AUTO: 10.15 K/UL — SIGNIFICANT CHANGE UP (ref 4.8–10.8)

## 2023-03-14 PROCEDURE — 0241U: CPT

## 2023-03-14 PROCEDURE — 96374 THER/PROPH/DIAG INJ IV PUSH: CPT | Mod: XU

## 2023-03-14 PROCEDURE — 99284 EMERGENCY DEPT VISIT MOD MDM: CPT

## 2023-03-14 PROCEDURE — 96375 TX/PRO/DX INJ NEW DRUG ADDON: CPT

## 2023-03-14 PROCEDURE — 99285 EMERGENCY DEPT VISIT HI MDM: CPT | Mod: 25

## 2023-03-14 PROCEDURE — 82010 KETONE BODYS QUAN: CPT

## 2023-03-14 PROCEDURE — 84703 CHORIONIC GONADOTROPIN ASSAY: CPT

## 2023-03-14 PROCEDURE — 93010 ELECTROCARDIOGRAM REPORT: CPT

## 2023-03-14 PROCEDURE — 82330 ASSAY OF CALCIUM: CPT

## 2023-03-14 PROCEDURE — 93005 ELECTROCARDIOGRAM TRACING: CPT

## 2023-03-14 PROCEDURE — 80053 COMPREHEN METABOLIC PANEL: CPT

## 2023-03-14 PROCEDURE — 85018 HEMOGLOBIN: CPT

## 2023-03-14 PROCEDURE — 36415 COLL VENOUS BLD VENIPUNCTURE: CPT

## 2023-03-14 PROCEDURE — 83605 ASSAY OF LACTIC ACID: CPT

## 2023-03-14 PROCEDURE — 85014 HEMATOCRIT: CPT

## 2023-03-14 PROCEDURE — 81003 URINALYSIS AUTO W/O SCOPE: CPT

## 2023-03-14 PROCEDURE — 84100 ASSAY OF PHOSPHORUS: CPT

## 2023-03-14 PROCEDURE — 71045 X-RAY EXAM CHEST 1 VIEW: CPT

## 2023-03-14 PROCEDURE — 84484 ASSAY OF TROPONIN QUANT: CPT

## 2023-03-14 PROCEDURE — 83735 ASSAY OF MAGNESIUM: CPT

## 2023-03-14 PROCEDURE — 83690 ASSAY OF LIPASE: CPT

## 2023-03-14 PROCEDURE — 84132 ASSAY OF SERUM POTASSIUM: CPT

## 2023-03-14 PROCEDURE — 85025 COMPLETE CBC W/AUTO DIFF WBC: CPT

## 2023-03-14 PROCEDURE — 82803 BLOOD GASES ANY COMBINATION: CPT

## 2023-03-14 PROCEDURE — 84295 ASSAY OF SERUM SODIUM: CPT

## 2023-03-14 PROCEDURE — 74177 CT ABD & PELVIS W/CONTRAST: CPT | Mod: MA

## 2023-03-14 PROCEDURE — 74177 CT ABD & PELVIS W/CONTRAST: CPT | Mod: 26,MA

## 2023-03-14 PROCEDURE — 71045 X-RAY EXAM CHEST 1 VIEW: CPT | Mod: 26

## 2023-03-14 PROCEDURE — 82962 GLUCOSE BLOOD TEST: CPT

## 2023-03-14 RX ORDER — FAMOTIDINE 10 MG/ML
20 INJECTION INTRAVENOUS ONCE
Refills: 0 | Status: COMPLETED | OUTPATIENT
Start: 2023-03-14 | End: 2023-03-14

## 2023-03-14 RX ORDER — SODIUM CHLORIDE 9 MG/ML
1000 INJECTION, SOLUTION INTRAVENOUS ONCE
Refills: 0 | Status: COMPLETED | OUTPATIENT
Start: 2023-03-14 | End: 2023-03-14

## 2023-03-14 RX ORDER — ALPRAZOLAM 0.25 MG
0.5 TABLET ORAL ONCE
Refills: 0 | Status: DISCONTINUED | OUTPATIENT
Start: 2023-03-14 | End: 2023-03-14

## 2023-03-14 RX ORDER — ONDANSETRON 8 MG/1
4 TABLET, FILM COATED ORAL ONCE
Refills: 0 | Status: COMPLETED | OUTPATIENT
Start: 2023-03-14 | End: 2023-03-14

## 2023-03-14 RX ORDER — KETOROLAC TROMETHAMINE 30 MG/ML
15 SYRINGE (ML) INJECTION ONCE
Refills: 0 | Status: DISCONTINUED | OUTPATIENT
Start: 2023-03-14 | End: 2023-03-14

## 2023-03-14 RX ADMIN — ONDANSETRON 4 MILLIGRAM(S): 8 TABLET, FILM COATED ORAL at 12:56

## 2023-03-14 RX ADMIN — FAMOTIDINE 20 MILLIGRAM(S): 10 INJECTION INTRAVENOUS at 12:56

## 2023-03-14 RX ADMIN — SODIUM CHLORIDE 1000 MILLILITER(S): 9 INJECTION, SOLUTION INTRAVENOUS at 13:15

## 2023-03-14 RX ADMIN — SODIUM CHLORIDE 1000 MILLILITER(S): 9 INJECTION, SOLUTION INTRAVENOUS at 11:15

## 2023-03-14 RX ADMIN — Medication 0.5 MILLIGRAM(S): at 15:41

## 2023-03-14 RX ADMIN — Medication 15 MILLIGRAM(S): at 14:50

## 2023-03-14 NOTE — ED ADULT NURSE NOTE - OBJECTIVE STATEMENT
BIBA from assisted living, pt c/o elevated blood sugar and chest tightness, fs 390 with ems pta, fs in triage 348

## 2023-03-14 NOTE — ED PROVIDER NOTE - CLINICAL SUMMARY MEDICAL DECISION MAKING FREE TEXT BOX
Patient signed out to me pending reevaluation and CT scan results.  Patient CT scan was grossly unremarkable.  Patient had improvement in her symptomatology and was discharged.

## 2023-03-14 NOTE — ED PROVIDER NOTE - ATTENDING APP SHARED VISIT CONTRIBUTION OF CARE
37-year-old female history of type 1 diabetes depression transfer evaluation of prolonged history of anterior chest pain as well as feeling lightheaded and elevated fingerstick.  Patient reports additionally right ear pain.  Lastly patient wants make sure she 9 diabetic acidosis due to hyperglycemia.  Her symptoms not associate with any vomiting but there is nausea. Here on exam, patient is no distress awake alert S1-S2 clear to auscultation belly soft nontender moist mucous membranes TMs clear bilaterally  Impression  Patient here with hyperglycemia and lightheaded as well as atypical anterior chest pain. The pain is prolonged in nature.  Here labs demonstrate no evidence of diabetic acidosis with improvement of hyperglycemia after IV fluids.  On reevaluation the patient is now hysterically crying complaining of abdominal discomfort.  Given Xanax with no improvement and having persistent abdominal pain will obtain CT scan

## 2023-03-14 NOTE — ED PROVIDER NOTE - NS ED ROS FT
Constitutional: (-) diaphoresis  Eyes/ENT: (-) runny nose  Cardiovascular: (-) chest pain  Respiratory: see HPI  Gastrointestinal: see HPI  : (-) dysuria   Musculoskeletal: (-) joint pain  Integumentary: (-) rash  Neurological: (-) LOC  Allergic/Immunologic: (-) pruritus

## 2023-03-14 NOTE — ED ADULT TRIAGE NOTE - HEIGHT IN CM
162.56 Received prescription renewal request for Methylphenidate HCl ER 36 MG Oral Tablet Extended Release ([facility])    Last appt: 10/5/20    Next appt:  none    Verified dosage(s) against:   [] provider appt note   [x] Other: Medication list/Rx history    Last Rx written on 9/14/20.    Per ePDMP, last dispensed on 9/14/20    Is the patient due for refill of this medication(s):   [x]  Yes  []  NO, Start date(s) adjusted accordingly    PDMP review:   [x]  Criteria met. Prescription sent to provider to sign.  []  Criteria NOT MET-

## 2023-03-14 NOTE — ED PROVIDER NOTE - PROGRESS NOTE DETAILS
s/o Dr. Guzmán f/u imaging and re-eval Ace: CT imaging without acute abdominal pathology.  Patient no reports abdominal pain resolved, tolerating p.o. Eager for discharge at this time.  Patient to be discharged from ED. Any available test results were discussed with patient and/or family. Verbal instructions given, including instructions to return to ED immediately for any new, worsening, or concerning symptoms. Patient endorsed understanding. Written discharge instructions additionally given, including follow-up plan.

## 2023-03-14 NOTE — ED PROVIDER NOTE - CARE PROVIDER_API CALL
Martha Robertson)  Internal Medicine  [address] [place] [address]  Phone: [phone]  Fax: [phone]  Follow Up Time:

## 2023-03-14 NOTE — ED ADULT NURSE NOTE - BEFAST SPEECH SLURRED
Pt calling to advise he was recently exposed on 9/9/20 to +COVID then was seen in office on 9/11/20 after exposure. Pt currently has not S&S is following CDC guidelines will be tested on 9/16/20 @ [facility].  RN instructed pt if he develops sx to go to [facility].  RN will f/u with pt on 9/16/20.   No

## 2023-03-14 NOTE — ED PROVIDER NOTE - NSFOLLOWUPINSTRUCTIONS_ED_ALL_ED_FT
Please follow up with your primary care doctor and GI doctor.  Take ibuprofen (Motrin) and acetaminophen (Tylenol) as needed for your symptoms.  A copy of your results is attached.    Abdominal Pain    Many things can cause abdominal pain. Many times, abdominal pain is not caused by a disease and will improve without treatment. Your health care provider will do a physical exam to determine if there is a dangerous cause of your pain; blood tests and imaging may help determine the cause of your pain. However, in many cases, no cause may be found and you may need further testing as an outpatient. Monitor your abdominal pain for any changes.     SEEK IMMEDIATE MEDICAL CARE IF YOU HAVE ANY OF THE FOLLOWING SYMPTOMS: worsening abdominal pain, uncontrollable vomiting, profuse diarrhea, inability to have bowel movements or pass gas, black or bloody stools, fever accompanying chest pain or back pain, or fainting. These symptoms may represent a serious problem that is an emergency. Do not wait to see if the symptoms will go away. Get medical help right away. Call 911 and do not drive yourself to the hospital.    Diabetes is a chronic condition caused by high blood sugar levels. Your blood sugar levels become high because your body does not have enough insulin. Insulin helps move sugar out of the blood so it can be used for energy. Increased sugar in your blood can cause problems in several organs of your body including but not limited to your blood vessels, your kidneys, your brain, and your eyes. The lack of insulin forces your body to use fat instead of sugar for energy. This can be dangerous if not controlled.  Seek Medical Attention If: You have a seizure. You begin to breathe fast, or are short of breath. You become weak and confused. You are more drowsy than usual. You have fruity, sweet breath. You have severe, new stomach pain and are vomiting. Your blood sugar level is lower or higher than your healthcare provider says it should be. You have ketones in your blood or urine. You have a fever or chills. You are more thirsty than usual. You are urinating more often than usual. You have questions or concerns about your condition or care.  Insulin and diabetes medicine decreases the amount of sugar in your blood. The best way to prevent problems from Diabetes is to control your blood sugars.  Monitor your blood sugar levels closely. Administer Insulin as directed by your physician.  Speak with your doctor and/or a nutritionist about the best way to change your lifestyle and dietary habits to avoid any problems from Diabetes in the future.

## 2023-03-14 NOTE — ED PROVIDER NOTE - PHYSICAL EXAMINATION
VITAL SIGNS: I have reviewed nursing notes and confirm.  CONSTITUTIONAL: well-appearing, non-toxic, NAD  SKIN: Warm dry, normal skin turgor  HEAD: NCAT  EYES: EOMI, no scleral icterus  ENT: Moist mucous membranes, normal pharynx with no erythema or exudates  NECK: Supple; non tender. Full ROM. No cervical LAD  CARD: RRR, no murmurs, rubs or gallops  RESP: clear to ausculation b/l.  No rales, rhonchi, or wheezing.  ABD: soft, non-tender, non-distended, no rebound or guarding. No CVA tenderness  EXT: Full ROM, no bony tenderness, no pedal edema, no calf tenderness  NEURO: normal motor. normal sensory. Normal gait.  PSYCH: Cooperative, appropriate.

## 2023-03-14 NOTE — ED ADULT TRIAGE NOTE - CHIEF COMPLAINT QUOTE
BIBA from assisted living, pt c/o elevated blood sugar and chest tightness, fs 390 with ems pta, fs in triage BIBA from assisted living, pt c/o elevated blood sugar and chest tightness, fs 390 with ems pta, fs in triage 348

## 2023-03-14 NOTE — ED ADULT NURSE NOTE - NSIMPLEMENTINTERV_GEN_ALL_ED
Implemented All Fall with Harm Risk Interventions:  [place] to call system. Call bell, personal items and telephone within reach. Instruct patient to call for assistance. Room bathroom lighting operational. Non-slip footwear when patient is off stretcher. Physically safe environment: no spills, clutter or unnecessary equipment. Stretcher in lowest position, wheels locked, appropriate side rails in place. Provide visual cue, wrist band, yellow gown, etc. Monitor gait and stability. Monitor for mental status changes and reorient to person, place, and time. Review medications for side effects contributing to fall risk. Reinforce activity limits and safety measures with patient and family. Provide visual clues: red socks.

## 2023-03-14 NOTE — ED PROVIDER NOTE - PATIENT PORTAL LINK FT
You can access the FollowMyHealth Patient Portal offered by [facility] by registering at the following website: [email or web address]. By joining [employer]’s FollowMyHealth portal, you will also be able to view your health information using other applications (apps) compatible with our system.

## 2023-03-14 NOTE — ED PROVIDER NOTE - OBJECTIVE STATEMENT
37-year-old female past medical history hypertension, diabetes, pseudoseizures, major depressive disorder presents to ED for elevated blood sugars.  Patient states she read she could be in diabetic ketoacidosis and came in to rule out DKA.  Denies fever, chills, congestion,, shortness of breath, diarrhea, constipation, urinary symptoms.  Endorses mild cough.  Reports mild diffuse abdominal pain. Endorses nausea and dry retching and vomiting.

## 2023-03-15 RX ORDER — LANCETS 28 GAUGE
EACH MISCELLANEOUS
Qty: 1 | Refills: 3 | Status: ACTIVE | COMMUNITY
Start: 2022-11-02 | End: 1900-01-01

## 2023-03-17 ENCOUNTER — EMERGENCY (EMERGENCY)
Facility: HOSPITAL | Age: 38
LOS: 0 days | Discharge: HOME | End: 2023-03-17
Attending: EMERGENCY MEDICINE
Payer: MEDICAID

## 2023-03-17 VITALS
HEIGHT: 64 IN | SYSTOLIC BLOOD PRESSURE: 136 MMHG | WEIGHT: 186.07 LBS | HEART RATE: 115 BPM | TEMPERATURE: 98 F | RESPIRATION RATE: 20 BRPM | DIASTOLIC BLOOD PRESSURE: 94 MMHG | OXYGEN SATURATION: 100 %

## 2023-03-17 DIAGNOSIS — E11.65 TYPE 2 DIABETES MELLITUS WITH HYPERGLYCEMIA: ICD-10-CM

## 2023-03-17 DIAGNOSIS — F32.9 MAJOR DEPRESSIVE DISORDER, SINGLE EPISODE, UNSPECIFIED: ICD-10-CM

## 2023-03-17 DIAGNOSIS — Z87.39 PERSONAL HISTORY OF OTHER DISEASES OF THE MUSCULOSKELETAL SYSTEM AND CONNECTIVE TISSUE: ICD-10-CM

## 2023-03-17 DIAGNOSIS — Z88.8 ALLERGY STATUS TO OTHER DRUGS, MEDICAMENTS AND BIOLOGICAL SUBSTANCES STATUS: ICD-10-CM

## 2023-03-17 DIAGNOSIS — Z79.82 LONG TERM (CURRENT) USE OF ASPIRIN: ICD-10-CM

## 2023-03-17 DIAGNOSIS — E10.65 TYPE 1 DIABETES MELLITUS WITH HYPERGLYCEMIA: ICD-10-CM

## 2023-03-17 DIAGNOSIS — Z79.4 LONG TERM (CURRENT) USE OF INSULIN: ICD-10-CM

## 2023-03-17 DIAGNOSIS — F41.9 ANXIETY DISORDER, UNSPECIFIED: ICD-10-CM

## 2023-03-17 DIAGNOSIS — Z88.1 ALLERGY STATUS TO OTHER ANTIBIOTIC AGENTS STATUS: ICD-10-CM

## 2023-03-17 DIAGNOSIS — E10.40 TYPE 1 DIABETES MELLITUS WITH DIABETIC NEUROPATHY, UNSPECIFIED: ICD-10-CM

## 2023-03-17 DIAGNOSIS — G89.29 OTHER CHRONIC PAIN: ICD-10-CM

## 2023-03-17 DIAGNOSIS — R10.9 UNSPECIFIED ABDOMINAL PAIN: ICD-10-CM

## 2023-03-17 DIAGNOSIS — Z86.69 PERSONAL HISTORY OF OTHER DISEASES OF THE NERVOUS SYSTEM AND SENSE ORGANS: ICD-10-CM

## 2023-03-17 DIAGNOSIS — I10 ESSENTIAL (PRIMARY) HYPERTENSION: ICD-10-CM

## 2023-03-17 DIAGNOSIS — Z88.0 ALLERGY STATUS TO PENICILLIN: ICD-10-CM

## 2023-03-17 DIAGNOSIS — Z88.7 ALLERGY STATUS TO SERUM AND VACCINE: ICD-10-CM

## 2023-03-17 LAB
ALBUMIN SERPL ELPH-MCNC: 4.3 G/DL — SIGNIFICANT CHANGE UP (ref 3.5–5.2)
ALP SERPL-CCNC: 124 U/L — HIGH (ref 30–115)
ALT FLD-CCNC: 47 U/L — HIGH (ref 0–41)
ANION GAP SERPL CALC-SCNC: 9 MMOL/L — SIGNIFICANT CHANGE UP (ref 7–14)
APPEARANCE UR: CLEAR — SIGNIFICANT CHANGE UP
AST SERPL-CCNC: 26 U/L — SIGNIFICANT CHANGE UP (ref 0–41)
B-OH-BUTYR SERPL-SCNC: 0.2 MMOL/L — SIGNIFICANT CHANGE UP
BACTERIA # UR AUTO: NEGATIVE — SIGNIFICANT CHANGE UP
BASOPHILS # BLD AUTO: 0.06 K/UL — SIGNIFICANT CHANGE UP (ref 0–0.2)
BASOPHILS NFR BLD AUTO: 0.6 % — SIGNIFICANT CHANGE UP (ref 0–1)
BILIRUB SERPL-MCNC: 0.2 MG/DL — SIGNIFICANT CHANGE UP (ref 0.2–1.2)
BILIRUB UR-MCNC: NEGATIVE — SIGNIFICANT CHANGE UP
BUN SERPL-MCNC: 12 MG/DL — SIGNIFICANT CHANGE UP (ref 10–20)
CALCIUM SERPL-MCNC: 9.2 MG/DL — SIGNIFICANT CHANGE UP (ref 8.4–10.5)
CHLORIDE SERPL-SCNC: 95 MMOL/L — LOW (ref 98–110)
CO2 SERPL-SCNC: 24 MMOL/L — SIGNIFICANT CHANGE UP (ref 17–32)
COLOR SPEC: YELLOW — SIGNIFICANT CHANGE UP
CREAT SERPL-MCNC: 0.6 MG/DL — LOW (ref 0.7–1.5)
DIFF PNL FLD: NEGATIVE — SIGNIFICANT CHANGE UP
EGFR: 118 ML/MIN/1.73M2 — SIGNIFICANT CHANGE UP
EOSINOPHIL # BLD AUTO: 0.03 K/UL — SIGNIFICANT CHANGE UP (ref 0–0.7)
EOSINOPHIL NFR BLD AUTO: 0.3 % — SIGNIFICANT CHANGE UP (ref 0–8)
EPI CELLS # UR: 4 /HPF — SIGNIFICANT CHANGE UP (ref 0–5)
GLUCOSE SERPL-MCNC: 239 MG/DL — HIGH (ref 70–99)
GLUCOSE UR QL: ABNORMAL
HCT VFR BLD CALC: 39.5 % — SIGNIFICANT CHANGE UP (ref 37–47)
HGB BLD-MCNC: 12.2 G/DL — SIGNIFICANT CHANGE UP (ref 12–16)
HYALINE CASTS # UR AUTO: 2 /LPF — SIGNIFICANT CHANGE UP (ref 0–7)
IMM GRANULOCYTES NFR BLD AUTO: 0.6 % — HIGH (ref 0.1–0.3)
KETONES UR-MCNC: ABNORMAL
LEUKOCYTE ESTERASE UR-ACNC: NEGATIVE — SIGNIFICANT CHANGE UP
LIDOCAIN IGE QN: 12 U/L — SIGNIFICANT CHANGE UP (ref 7–60)
LYMPHOCYTES # BLD AUTO: 1.9 K/UL — SIGNIFICANT CHANGE UP (ref 1.2–3.4)
LYMPHOCYTES # BLD AUTO: 19.5 % — LOW (ref 20.5–51.1)
MAGNESIUM SERPL-MCNC: 1.8 MG/DL — SIGNIFICANT CHANGE UP (ref 1.8–2.4)
MCHC RBC-ENTMCNC: 24.4 PG — LOW (ref 27–31)
MCHC RBC-ENTMCNC: 30.9 G/DL — LOW (ref 32–37)
MCV RBC AUTO: 79 FL — LOW (ref 81–99)
MONOCYTES # BLD AUTO: 0.6 K/UL — SIGNIFICANT CHANGE UP (ref 0.1–0.6)
MONOCYTES NFR BLD AUTO: 6.2 % — SIGNIFICANT CHANGE UP (ref 1.7–9.3)
NEUTROPHILS # BLD AUTO: 7.09 K/UL — HIGH (ref 1.4–6.5)
NEUTROPHILS NFR BLD AUTO: 72.8 % — SIGNIFICANT CHANGE UP (ref 42.2–75.2)
NITRITE UR-MCNC: NEGATIVE — SIGNIFICANT CHANGE UP
NRBC # BLD: 0 /100 WBCS — SIGNIFICANT CHANGE UP (ref 0–0)
PH UR: 5.5 — SIGNIFICANT CHANGE UP (ref 5–8)
PLATELET # BLD AUTO: 462 K/UL — HIGH (ref 130–400)
POTASSIUM SERPL-MCNC: 4.7 MMOL/L — SIGNIFICANT CHANGE UP (ref 3.5–5)
POTASSIUM SERPL-SCNC: 4.7 MMOL/L — SIGNIFICANT CHANGE UP (ref 3.5–5)
PROT SERPL-MCNC: 7.8 G/DL — SIGNIFICANT CHANGE UP (ref 6–8)
PROT UR-MCNC: ABNORMAL
RBC # BLD: 5 M/UL — SIGNIFICANT CHANGE UP (ref 4.2–5.4)
RBC # FLD: 24.7 % — HIGH (ref 11.5–14.5)
RBC CASTS # UR COMP ASSIST: 1 /HPF — SIGNIFICANT CHANGE UP (ref 0–4)
SODIUM SERPL-SCNC: 128 MMOL/L — LOW (ref 135–146)
SP GR SPEC: 1.03 — SIGNIFICANT CHANGE UP (ref 1.01–1.03)
UROBILINOGEN FLD QL: SIGNIFICANT CHANGE UP
WBC # BLD: 9.74 K/UL — SIGNIFICANT CHANGE UP (ref 4.8–10.8)
WBC # FLD AUTO: 9.74 K/UL — SIGNIFICANT CHANGE UP (ref 4.8–10.8)
WBC UR QL: 5 /HPF — SIGNIFICANT CHANGE UP (ref 0–5)

## 2023-03-17 PROCEDURE — 96374 THER/PROPH/DIAG INJ IV PUSH: CPT

## 2023-03-17 PROCEDURE — 99284 EMERGENCY DEPT VISIT MOD MDM: CPT | Mod: 25

## 2023-03-17 PROCEDURE — 81001 URINALYSIS AUTO W/SCOPE: CPT

## 2023-03-17 PROCEDURE — 85025 COMPLETE CBC W/AUTO DIFF WBC: CPT

## 2023-03-17 PROCEDURE — 80053 COMPREHEN METABOLIC PANEL: CPT

## 2023-03-17 PROCEDURE — 82962 GLUCOSE BLOOD TEST: CPT

## 2023-03-17 PROCEDURE — 83735 ASSAY OF MAGNESIUM: CPT

## 2023-03-17 PROCEDURE — 93010 ELECTROCARDIOGRAM REPORT: CPT

## 2023-03-17 PROCEDURE — 83690 ASSAY OF LIPASE: CPT

## 2023-03-17 PROCEDURE — 93005 ELECTROCARDIOGRAM TRACING: CPT

## 2023-03-17 PROCEDURE — 99285 EMERGENCY DEPT VISIT HI MDM: CPT

## 2023-03-17 PROCEDURE — 82010 KETONE BODYS QUAN: CPT

## 2023-03-17 PROCEDURE — 36415 COLL VENOUS BLD VENIPUNCTURE: CPT

## 2023-03-17 RX ORDER — KETOROLAC TROMETHAMINE 30 MG/ML
15 SYRINGE (ML) INJECTION ONCE
Refills: 0 | Status: DISCONTINUED | OUTPATIENT
Start: 2023-03-17 | End: 2023-03-17

## 2023-03-17 RX ORDER — HYDROXYZINE HCL 10 MG
25 TABLET ORAL ONCE
Refills: 0 | Status: COMPLETED | OUTPATIENT
Start: 2023-03-17 | End: 2023-03-17

## 2023-03-17 RX ORDER — SODIUM CHLORIDE 9 MG/ML
1000 INJECTION, SOLUTION INTRAVENOUS ONCE
Refills: 0 | Status: COMPLETED | OUTPATIENT
Start: 2023-03-17 | End: 2023-03-17

## 2023-03-17 RX ADMIN — Medication 15 MILLIGRAM(S): at 11:35

## 2023-03-17 RX ADMIN — Medication 25 MILLIGRAM(S): at 11:35

## 2023-03-17 RX ADMIN — Medication 15 MILLIGRAM(S): at 11:50

## 2023-03-17 RX ADMIN — SODIUM CHLORIDE 1000 MILLILITER(S): 9 INJECTION, SOLUTION INTRAVENOUS at 11:36

## 2023-03-17 NOTE — ED PROVIDER NOTE - PROGRESS NOTE DETAILS
TN -insulin pump functioning at baseline; pt does not alter her insulin dose as it is managed by her diabetic educator.

## 2023-03-17 NOTE — ED PROVIDER NOTE - NSFOLLOWUPINSTRUCTIONS_ED_ALL_ED_FT
Anxiety    Generalized anxiety disorder (ARMAAN) is a mental disorder. It is defined as anxiety that is not necessarily related to specific events or activities or is out of proportion to said events. Symptoms include restlessness, fatigue, difficulty concentrations, irritability and difficulty concentrating. It may interfere with life functions, including relationships, work, and school. If you were started on a medication, make sure to take exactly as prescribed and follow up with a psychiatrist.    SEEK IMMEDIATE MEDICAL CARE IF YOU HAVE ANY OF THE FOLLOWING SYMPTOMS: thoughts about hurting killing yourself, thoughts about hurting or killing somebody else, hallucinations, or worsening depression.        Diabetes and Exercise    WHAT YOU NEED TO KNOW:    How will exercise help me manage diabetes? Physical activity, such as exercise, can help keep your blood sugar level steady or improve insulin resistance. Activity can help decrease your risk for heart disease, and help you lose weight. Exercise can also help lower your A1c or keep it at goal. Your diabetes care team will help you create an exercise plan. The plan will be based on the type of diabetes you have and your starting fitness level.    What are some tips to help me create and meet my exercise goals? •Set a goal for 150 minutes (2.5 hours) of moderate to vigorous aerobic activity each week. Aerobic activity helps your heart stay strong. Aerobic activity includes walking, bicycling, dancing, swimming, and raking leaves. Spread aerobic activity over 3 to 5 days. Do not take more than 2 days off in a row. It is best to do at least 10 minutes at a time and 30 minutes each day. You can work up to these goals. Remember that any activity is better than no activity. Over time, you can make exercise more intense or last longer. You can also add more days of exercise as your fitness level improves. Your diabetes care team can help you make a step-by-step plan to achieve your goals.  FAMILY WALKING FOR EXERCISE      •Set a strength training goal of 2 to 3 times a week. Take at least 1 day off in between strength training sessions. Strength training helps you keep the muscles you have and build new muscles. Strength training includes lifting weights, climbing stairs, yoga, and gary chi. Strength Training for Adults      •Older adults should include balance training 2 to 3 times each week. These include walking backwards, standing on one foot, and walking heel to toe in a straight line. Walking Backward for Seniors     Balance on 1 Foot     Walking in a Straight Line for Seniors      What are some other healthy activity tips? •Stretch before and after you exercise to prevent injury. Warm up and Cool Down      •Drink water or liquids that do not contain sugar before, during, and after exercise. Ask your dietitian or healthcare provider which liquids you should drink when you exercise.     •Do not sit for longer than 30 minutes at a time. If you cannot walk around, at least stand up. This will help you stay active and keep your blood circulating. Try to be active throughout your day. Ways to Be Physically Active      What do I need to know about exercise and my blood sugar levels? Check your blood sugar level before and after exercise, if you use insulin. Healthcare providers may tell you to change the amount of insulin you take or food you eat. •If your blood sugar level is high, check your blood or urine for ketones before you exercise. Do not exercise if your blood sugar level is high and you have ketones.     •If your blood sugar level is less than 100 mg/dL, have a carbohydrate snack before you exercise. Examples are 4 to 6 crackers, ½ banana, 8 ounces (1 cup) of milk, or 4 ounces (½ cup) of juice. Ways to Raise Your Blood Sugar      Call your local emergency number (911 in the [place]) if: •You have chest pain or shortness of breath.         When should I seek immediate care? •You have a low blood sugar level and it does not improve with treatment. Symptoms are trouble thinking, a pounding heartbeat, and sweating.     •Your blood sugar level is above 240 mg/dL and does not come down within 15 minutes of treatment.     •You have blurred or double vision.     •Your breath has a fruity, sweet smell, or your breathing is shallow.     When should I call my doctor or diabetes care team? •You have ketones in your blood or urine.     •You have a fever.     •Your blood sugar levels are higher than your target goals.     •You often have low blood sugar levels.     •Your skin is red, dry, warm, or swollen.     •You have a wound that does not heal.     •You have trouble coping with diabetes, or you feel anxious or depressed.     •You have questions or concerns about your condition or care.     CARE AGREEMENT:    You have the right to help plan your care. Learn about your health condition and how it may be treated. Discuss treatment options with your healthcare providers to decide what care you want to receive. You always have the right to refuse treatment.      © Copyright [employer] 2023

## 2023-03-17 NOTE — ED ADULT NURSE NOTE - CHIEF COMPLAINT QUOTE
KRISTIN from [facility] for elevated blood sugar x few days, pt also c/o feeling anxious, denies si/hi, fs in triage 229

## 2023-03-17 NOTE — ED PROVIDER NOTE - OBJECTIVE STATEMENT
37-year-old female past medical history of pseudoseizures, MDD, anxiety, diabetes with an insulin pump that is managed by her diabetic educator,, hypertension that presents to the ED for hyperglycemia.  Patient states that in the home she had a glucose of 300 she states that she looked without what that meant and became increasingly concerned for DKA. pt states that she is suffering from her chronic abd pain that is not different from before.

## 2023-03-17 NOTE — ED ADULT NURSE NOTE - COVID-19 ORDERING FACILITY
[facility]:  I discussed with the patient the risks of golimumab including but not limited to myelosuppression, immunosuppression, autoimmune hepatitis, demyelinating diseases, lymphoma, and serious infections.  The patient understands that monitoring is required including a PPD at baseline and must alert us or the primary physician if symptoms of infection or other concerning signs are noted.

## 2023-03-17 NOTE — ED ADULT TRIAGE NOTE - CHIEF COMPLAINT QUOTE
KRISTIN from [facility] for elevated blood sugar x few days, pt also c/o feeling anxious, denies si/hi, fs in triage 229

## 2023-03-17 NOTE — ED PROVIDER NOTE - CLINICAL SUMMARY MEDICAL DECISION MAKING FREE TEXT BOX
37-year-old female, PMH DM, MDD, HTN, anxiety, presents with hyperglycemia and generalized abdominal pain.  Patient seen for abdominal pain 3 days ago, had negative CT abdomen and negative laboratory work-up.  Patient states pain is about the same, no fever, vomiting diarrhea urinary complaints or back pain.  Notes also elevated blood glucose.  No polyuria or polydipsia.    Exam as noted, patient is NAD, abdomen is soft nontender, no CVAT.  All available lab tests, imaging tests, and EKGs independently reviewed and interpreted by me.  Patient not in DKA.  EKG no STEMI.    IMP: Abdominal pain, elevated blood glucose.  Pt stable for dc w/ continued oupt w/up, pmd f/up, and care as discussed.  Pt understands plan and signs and symptoms for ED return. DC home.    .

## 2023-03-17 NOTE — ED PROVIDER NOTE - NSFOLLOWUPCLINICS_GEN_ALL_ED_FT
[facility]  Medicine  [address], [place]   Phone: [phone]  Fax:

## 2023-03-17 NOTE — ED PROVIDER NOTE - PATIENT PORTAL LINK FT
You can access the FollowMyHealth Patient Portal offered by [facility] by registering at the following website: [email or web address]. By joining [employer]’s FollowMyHealth portal, you will also be able to view your health information using other applications (apps) compatible with our system.

## 2023-03-22 ENCOUNTER — EMERGENCY (EMERGENCY)
Facility: HOSPITAL | Age: 38
LOS: 0 days | Discharge: HOME | End: 2023-03-23
Attending: EMERGENCY MEDICINE
Payer: MEDICAID

## 2023-03-22 DIAGNOSIS — I10 ESSENTIAL (PRIMARY) HYPERTENSION: ICD-10-CM

## 2023-03-22 DIAGNOSIS — Z79.4 LONG TERM (CURRENT) USE OF INSULIN: ICD-10-CM

## 2023-03-22 DIAGNOSIS — G43.909 MIGRAINE, UNSPECIFIED, NOT INTRACTABLE, WITHOUT STATUS MIGRAINOSUS: ICD-10-CM

## 2023-03-22 DIAGNOSIS — Z88.0 ALLERGY STATUS TO PENICILLIN: ICD-10-CM

## 2023-03-22 DIAGNOSIS — F41.9 ANXIETY DISORDER, UNSPECIFIED: ICD-10-CM

## 2023-03-22 DIAGNOSIS — Z79.82 LONG TERM (CURRENT) USE OF ASPIRIN: ICD-10-CM

## 2023-03-22 DIAGNOSIS — M54.9 DORSALGIA, UNSPECIFIED: ICD-10-CM

## 2023-03-22 DIAGNOSIS — Z20.822 CONTACT WITH AND (SUSPECTED) EXPOSURE TO COVID-19: ICD-10-CM

## 2023-03-22 DIAGNOSIS — E10.65 TYPE 1 DIABETES MELLITUS WITH HYPERGLYCEMIA: ICD-10-CM

## 2023-03-22 DIAGNOSIS — Z96.41 PRESENCE OF INSULIN PUMP (EXTERNAL) (INTERNAL): ICD-10-CM

## 2023-03-22 DIAGNOSIS — K21.9 GASTRO-ESOPHAGEAL REFLUX DISEASE WITHOUT ESOPHAGITIS: ICD-10-CM

## 2023-03-22 DIAGNOSIS — Z88.1 ALLERGY STATUS TO OTHER ANTIBIOTIC AGENTS STATUS: ICD-10-CM

## 2023-03-22 DIAGNOSIS — R73.9 HYPERGLYCEMIA, UNSPECIFIED: ICD-10-CM

## 2023-03-22 DIAGNOSIS — E10.40 TYPE 1 DIABETES MELLITUS WITH DIABETIC NEUROPATHY, UNSPECIFIED: ICD-10-CM

## 2023-03-22 DIAGNOSIS — G89.29 OTHER CHRONIC PAIN: ICD-10-CM

## 2023-03-22 DIAGNOSIS — Z87.19 PERSONAL HISTORY OF OTHER DISEASES OF THE DIGESTIVE SYSTEM: ICD-10-CM

## 2023-03-22 DIAGNOSIS — F32.A DEPRESSION, UNSPECIFIED: ICD-10-CM

## 2023-03-22 PROCEDURE — 82803 BLOOD GASES ANY COMBINATION: CPT

## 2023-03-22 PROCEDURE — 99284 EMERGENCY DEPT VISIT MOD MDM: CPT | Mod: 25

## 2023-03-22 PROCEDURE — 99284 EMERGENCY DEPT VISIT MOD MDM: CPT

## 2023-03-22 PROCEDURE — 82330 ASSAY OF CALCIUM: CPT

## 2023-03-22 PROCEDURE — 82962 GLUCOSE BLOOD TEST: CPT

## 2023-03-22 PROCEDURE — 83605 ASSAY OF LACTIC ACID: CPT

## 2023-03-22 PROCEDURE — 36415 COLL VENOUS BLD VENIPUNCTURE: CPT

## 2023-03-22 PROCEDURE — 81003 URINALYSIS AUTO W/O SCOPE: CPT

## 2023-03-22 PROCEDURE — 0241U: CPT

## 2023-03-22 PROCEDURE — 85025 COMPLETE CBC W/AUTO DIFF WBC: CPT

## 2023-03-22 PROCEDURE — 80053 COMPREHEN METABOLIC PANEL: CPT

## 2023-03-22 PROCEDURE — 82010 KETONE BODYS QUAN: CPT

## 2023-03-22 PROCEDURE — 84703 CHORIONIC GONADOTROPIN ASSAY: CPT

## 2023-03-22 PROCEDURE — 84295 ASSAY OF SERUM SODIUM: CPT

## 2023-03-22 PROCEDURE — 84132 ASSAY OF SERUM POTASSIUM: CPT

## 2023-03-22 PROCEDURE — 87086 URINE CULTURE/COLONY COUNT: CPT

## 2023-03-22 PROCEDURE — 83735 ASSAY OF MAGNESIUM: CPT

## 2023-03-22 PROCEDURE — 85014 HEMATOCRIT: CPT

## 2023-03-22 PROCEDURE — 96374 THER/PROPH/DIAG INJ IV PUSH: CPT

## 2023-03-22 PROCEDURE — 85018 HEMOGLOBIN: CPT

## 2023-03-23 VITALS
HEIGHT: 64 IN | RESPIRATION RATE: 18 BRPM | TEMPERATURE: 99 F | DIASTOLIC BLOOD PRESSURE: 78 MMHG | HEART RATE: 90 BPM | SYSTOLIC BLOOD PRESSURE: 135 MMHG | OXYGEN SATURATION: 98 %

## 2023-03-23 LAB
ALBUMIN SERPL ELPH-MCNC: 4.1 G/DL — SIGNIFICANT CHANGE UP (ref 3.5–5.2)
ALP SERPL-CCNC: 157 U/L — HIGH (ref 30–115)
ALT FLD-CCNC: 34 U/L — SIGNIFICANT CHANGE UP (ref 0–41)
ANION GAP SERPL CALC-SCNC: 12 MMOL/L — SIGNIFICANT CHANGE UP (ref 7–14)
APPEARANCE UR: CLEAR — SIGNIFICANT CHANGE UP
AST SERPL-CCNC: 14 U/L — SIGNIFICANT CHANGE UP (ref 0–41)
B-OH-BUTYR SERPL-SCNC: <0.2 MMOL/L — SIGNIFICANT CHANGE UP
BASE EXCESS BLDV CALC-SCNC: 1.7 MMOL/L — SIGNIFICANT CHANGE UP (ref -2–3)
BASOPHILS # BLD AUTO: 0.07 K/UL — SIGNIFICANT CHANGE UP (ref 0–0.2)
BASOPHILS NFR BLD AUTO: 0.6 % — SIGNIFICANT CHANGE UP (ref 0–1)
BILIRUB SERPL-MCNC: <0.2 MG/DL — SIGNIFICANT CHANGE UP (ref 0.2–1.2)
BILIRUB UR-MCNC: NEGATIVE — SIGNIFICANT CHANGE UP
BUN SERPL-MCNC: 15 MG/DL — SIGNIFICANT CHANGE UP (ref 10–20)
CA-I SERPL-SCNC: 1.23 MMOL/L — SIGNIFICANT CHANGE UP (ref 1.15–1.33)
CALCIUM SERPL-MCNC: 9.7 MG/DL — SIGNIFICANT CHANGE UP (ref 8.4–10.4)
CHLORIDE SERPL-SCNC: 96 MMOL/L — LOW (ref 98–110)
CO2 SERPL-SCNC: 23 MMOL/L — SIGNIFICANT CHANGE UP (ref 17–32)
COLOR SPEC: SIGNIFICANT CHANGE UP
CREAT SERPL-MCNC: 0.7 MG/DL — SIGNIFICANT CHANGE UP (ref 0.7–1.5)
DIFF PNL FLD: NEGATIVE — SIGNIFICANT CHANGE UP
EGFR: 114 ML/MIN/1.73M2 — SIGNIFICANT CHANGE UP
EOSINOPHIL # BLD AUTO: 0.13 K/UL — SIGNIFICANT CHANGE UP (ref 0–0.7)
EOSINOPHIL NFR BLD AUTO: 1.2 % — SIGNIFICANT CHANGE UP (ref 0–8)
FLUAV AG NPH QL: SIGNIFICANT CHANGE UP
FLUBV AG NPH QL: SIGNIFICANT CHANGE UP
GAS PNL BLDV: 130 MMOL/L — LOW (ref 136–145)
GAS PNL BLDV: SIGNIFICANT CHANGE UP
GLUCOSE SERPL-MCNC: 407 MG/DL — HIGH (ref 70–99)
GLUCOSE UR QL: ABNORMAL
HCG SERPL QL: NEGATIVE — SIGNIFICANT CHANGE UP
HCO3 BLDV-SCNC: 27 MMOL/L — SIGNIFICANT CHANGE UP (ref 22–29)
HCT VFR BLD CALC: 41.2 % — SIGNIFICANT CHANGE UP (ref 37–47)
HGB BLD-MCNC: 12.7 G/DL — SIGNIFICANT CHANGE UP (ref 12–16)
IMM GRANULOCYTES NFR BLD AUTO: 0.6 % — HIGH (ref 0.1–0.3)
KETONES UR-MCNC: NEGATIVE — SIGNIFICANT CHANGE UP
LACTATE BLDV-MCNC: 1.7 MMOL/L — SIGNIFICANT CHANGE UP (ref 0.5–2)
LEUKOCYTE ESTERASE UR-ACNC: NEGATIVE — SIGNIFICANT CHANGE UP
LYMPHOCYTES # BLD AUTO: 29.7 % — SIGNIFICANT CHANGE UP (ref 20.5–51.1)
LYMPHOCYTES # BLD AUTO: 3.21 K/UL — SIGNIFICANT CHANGE UP (ref 1.2–3.4)
MAGNESIUM SERPL-MCNC: 1.9 MG/DL — SIGNIFICANT CHANGE UP (ref 1.8–2.4)
MCHC RBC-ENTMCNC: 24.8 PG — LOW (ref 27–31)
MCHC RBC-ENTMCNC: 30.8 G/DL — LOW (ref 32–37)
MCV RBC AUTO: 80.5 FL — LOW (ref 81–99)
MONOCYTES # BLD AUTO: 0.84 K/UL — HIGH (ref 0.1–0.6)
MONOCYTES NFR BLD AUTO: 7.8 % — SIGNIFICANT CHANGE UP (ref 1.7–9.3)
NEUTROPHILS # BLD AUTO: 6.51 K/UL — HIGH (ref 1.4–6.5)
NEUTROPHILS NFR BLD AUTO: 60.1 % — SIGNIFICANT CHANGE UP (ref 42.2–75.2)
NITRITE UR-MCNC: NEGATIVE — SIGNIFICANT CHANGE UP
NRBC # BLD: 0 /100 WBCS — SIGNIFICANT CHANGE UP (ref 0–0)
PCO2 BLDV: 45 MMHG — HIGH (ref 39–42)
PH BLDV: 7.39 — SIGNIFICANT CHANGE UP (ref 7.32–7.43)
PH UR: 6 — SIGNIFICANT CHANGE UP (ref 5–8)
PLATELET # BLD AUTO: 454 K/UL — HIGH (ref 130–400)
PO2 BLDV: 78 MMHG — SIGNIFICANT CHANGE UP
POTASSIUM BLDV-SCNC: 5.2 MMOL/L — HIGH (ref 3.5–5.1)
POTASSIUM SERPL-MCNC: 4.9 MMOL/L — SIGNIFICANT CHANGE UP (ref 3.5–5)
POTASSIUM SERPL-SCNC: 4.9 MMOL/L — SIGNIFICANT CHANGE UP (ref 3.5–5)
PROT SERPL-MCNC: 7.7 G/DL — SIGNIFICANT CHANGE UP (ref 6–8)
PROT UR-MCNC: NEGATIVE — SIGNIFICANT CHANGE UP
RBC # BLD: 5.12 M/UL — SIGNIFICANT CHANGE UP (ref 4.2–5.4)
RBC # FLD: 23.9 % — HIGH (ref 11.5–14.5)
RSV RNA NPH QL NAA+NON-PROBE: SIGNIFICANT CHANGE UP
SAO2 % BLDV: 97 % — SIGNIFICANT CHANGE UP
SARS-COV-2 RNA SPEC QL NAA+PROBE: SIGNIFICANT CHANGE UP
SODIUM SERPL-SCNC: 131 MMOL/L — LOW (ref 135–146)
SP GR SPEC: 1.04 — HIGH (ref 1.01–1.03)
UROBILINOGEN FLD QL: SIGNIFICANT CHANGE UP
WBC # BLD: 10.82 K/UL — HIGH (ref 4.8–10.8)
WBC # FLD AUTO: 10.82 K/UL — HIGH (ref 4.8–10.8)

## 2023-03-23 RX ORDER — ONDANSETRON 8 MG/1
4 TABLET, FILM COATED ORAL ONCE
Refills: 0 | Status: COMPLETED | OUTPATIENT
Start: 2023-03-23 | End: 2023-03-23

## 2023-03-23 RX ORDER — SODIUM CHLORIDE 9 MG/ML
1000 INJECTION, SOLUTION INTRAVENOUS ONCE
Refills: 0 | Status: COMPLETED | OUTPATIENT
Start: 2023-03-23 | End: 2023-03-23

## 2023-03-23 RX ORDER — ONDANSETRON 8 MG/1
1 TABLET, FILM COATED ORAL
Qty: 9 | Refills: 0
Start: 2023-03-23 | End: 2023-03-25

## 2023-03-23 RX ADMIN — SODIUM CHLORIDE 1000 MILLILITER(S): 9 INJECTION, SOLUTION INTRAVENOUS at 02:16

## 2023-03-23 RX ADMIN — ONDANSETRON 4 MILLIGRAM(S): 8 TABLET, FILM COATED ORAL at 05:05

## 2023-03-23 NOTE — ED PROVIDER NOTE - CARE PROVIDER_API CALL
Martha Robertson)  Internal Medicine  [address] [place] [id number]  Phone: [phone]  Fax: [phone]  Follow Up Time: 1-3 Days

## 2023-03-23 NOTE — ED PROVIDER NOTE - PHYSICAL EXAMINATION
VITAL SIGNS: I have reviewed nursing notes and confirm.  CONSTITUTIONAL: Well-developed; well-nourished; in no acute distress.  SKIN: Skin exam is warm and dry, no acute rash.  HEAD: Normocephalic; atraumatic.  EYES: PERRL, EOM intact; conjunctiva and sclera clear.  ENT: No nasal discharge; airway clear. TMs clear.  NECK: Supple; non tender.  CARD: S1, S2 normal; no murmurs, gallops, or rubs. Regular rate and rhythm.  RESP: No wheezes, rales or rhonchi. Speaking in full sentences.   ABD: Normal bowel sounds; soft; non-distended; non-tender; No rebound or guarding. No CVA tenderness.  EXT: Normal ROM. No clubbing, cyanosis or edema.  NEURO: Alert, oriented. Grossly unremarkable. No focal deficits.

## 2023-03-23 NOTE — ED PROVIDER NOTE - ATTENDING APP SHARED VISIT CONTRIBUTION OF CARE
37-year-old female past medical history of pseudoseizures, MDD, anxiety, diabetes with insulin pump presents emergency department for hypoglycemia.  Patient states she had elevated glucose all day with readings in the 500s.  She has noticed some tingling creased urination but no nausea vomiting diarrhea constipation fever chills.    Const: NAD  Eyes: PERRL, no conjunctival injection  HENT:  Neck supple without meningismus   CV: RRR, Warm, well-perfused extremities  RESP: CTA B/L, no tachypnea   GI: soft, non-tender, non-distended  MSK: No gross deformities appreciated  Skin: Warm, dry. No rashes  Neuro: Alert, CNs II-XII grossly intact. Sensation and motor function of extremities grossly intact.    will check labs to ensure pt is not in DKA

## 2023-03-23 NOTE — ED PROVIDER NOTE - NSFOLLOWUPINSTRUCTIONS_ED_ALL_ED_FT
Hyperglycemia  Hyperglycemia occurs when the level of sugar (glucose) in the blood is too high. Glucose is a type of sugar that provides the body's main source of energy. Certain hormones (insulin and glucagon) control the level of glucose in the blood. Insulin lowers blood glucose, and glucagon increases blood glucose. Hyperglycemia can result from having too little insulin in the bloodstream, or from the body not responding normally to insulin.  Hyperglycemia occurs most often in people who have diabetes (diabetes mellitus), but it can happen in people who do not have diabetes. It can develop quickly, and it can be life-threatening if it causes you to become severely dehydrated (diabetic ketoacidosis or hyperglycemic hyperosmolar state). Severe hyperglycemia is a medical emergency.  What are the causes?  If you have diabetes, hyperglycemia may be caused by:  Diabetes medicine.Medicines that increase blood glucose or affect your diabetes control.Not eating enough, or not eating often enough.Changes in physical activity level.Being sick or having an infection.If you have prediabetes or undiagnosed diabetes:  Hyperglycemia may be caused by those conditions.If you do not have diabetes, hyperglycemia may be caused by:  Certain medicines, including steroid medicines, beta-blockers, epinephrine, and thiazide diuretics.Stress.Serious illness.Surgery.Diseases of the pancreas.Infection.What increases the risk?  Hyperglycemia is more likely to develop in people who have risk factors for diabetes, such as:  Having a family member with diabetes.Having a gene for type 1 diabetes that is passed from parent to child (inherited).Living in an area with cold weather conditions.Exposure to certain viruses.Certain conditions in which the body's disease-fighting (immune) system attacks itself (autoimmune disorders).Being overweight or obese.Having an inactive (sedentary) lifestyle.Having been diagnosed with insulin resistance.Having a history of prediabetes, gestational diabetes, or polycystic ovarian syndrome (PCOS).Being of American-Chinese, -American, /, or / descent.What are the signs or symptoms?  Hyperglycemia may not cause any symptoms. If you do have symptoms, they may include early warning signs, such as:  Increased thirst.Hunger.Feeling very tired.Needing to urinate more often than usual.Blurry vision.Other symptoms may develop if hyperglycemia gets worse, such as:  Dry mouth.Loss of appetite.Fruity-smelling breath.Weakness.Unexpected or rapid weight gain or weight loss.Tingling or numbness in the hands or feet.Headache.Skin that does not quickly return to normal after being lightly pinched and released (poor skin turgor).Abdominal pain.Cuts or bruises that are slow to heal.How is this diagnosed?  Hyperglycemia is diagnosed with a blood test to measure your blood glucose level. This blood test is usually done while you are having symptoms. Your health care provider may also do a physical exam and review your medical history.  You may have more tests to determine the cause of your hyperglycemia, such as:  A fasting blood glucose (FBG) test. You will not be allowed to eat (you will fast) for at least 8 hours before a blood sample is taken.An A1c (hemoglobin A1c) blood test. This provides information about blood glucose control over the previous 2–3 months.An oral glucose tolerance test (OGTT). This measures your blood glucose at two times:  After fasting. This is your baseline blood glucose level.Two hours after drinking a beverage that contains glucose.How is this treated?  Treatment depends on the cause of your hyperglycemia. Treatment may include:  Taking medicine to regulate your blood glucose levels. If you take insulin or other diabetes medicines, your medicine or dosage may be adjusted.Lifestyle changes, such as exercising more, eating healthier foods, or losing weight.Treating an illness or infection, if this caused your hyperglycemia.Checking your blood glucose more often.Stopping or reducing steroid medicines, if these caused your hyperglycemia.If your hyperglycemia becomes severe and it results in hyperglycemic hyperosmolar state, you must be hospitalized and given IV fluids.  Follow these instructions at home:  Image   General instructions     Take over-the-counter and prescription medicines only as told by your health care provider.Do not use any products that contain nicotine or tobacco, such as cigarettes and e-cigarettes. If you need help quitting, ask your health care provider.Limit alcohol intake to no more than 1 drink per day for nonpregnant women and 2 drinks per day for men. One drink equals 12 oz of beer, 5 oz of wine, or 1½ oz of hard liquor.Learn to manage stress. If you need help with this, ask your health care provider.Keep all follow-up visits as told by your health care provider. This is important.Eating and drinking     Image   Maintain a healthy weight.Exercise regularly, as directed by your health care provider.Stay hydrated, especially when you exercise, get sick, or spend time in hot temperatures.Eat healthy foods, such as:  Lean proteins.Complex carbohydrates.Fresh fruits and vegetables.Low-fat dairy products.Healthy fats.Drink enough fluid to keep your urine clear or pale yellow.If you have diabetes:     Make sure you know the symptoms of hyperglycemia.Follow your diabetes management plan, as told by your health care provider. Make sure you:  Take your insulin and medicines as directed.Follow your exercise plan.Follow your meal plan. Eat on time, and do not skip meals.Check your blood glucose as often as directed. Make sure to check your blood glucose before and after exercise. If you exercise longer or in a different way than usual, check your blood glucose more often.Follow your sick day plan whenever you cannot eat or drink normally. Make this plan in advance with your health care provider.Share your diabetes management plan with people in your workplace, school, and household.Check your urine for ketones when you are ill and as told by your health care provider.Carry a medical alert card or wear medical alert jewelry.Contact a health care provider if:  Your blood glucose is at or above 240 mg/dL (13.3 mmol/L) for 2 days in a row.You have problems keeping your blood glucose in your target range.You have frequent episodes of hyperglycemia.Get help right away if:  You have difficulty breathing.You have a change in how you think, feel, or act (mental status).You have nausea or vomiting that does not go away.These symptoms may represent a serious problem that is an emergency. Do not wait to see if the symptoms will go away. Get medical help right away. Call your local emergency services (911 in the [place] Do not drive yourself to the hospital.   Summary  Hyperglycemia occurs when the level of sugar (glucose) in the blood is too high.Hyperglycemia is diagnosed with a blood test to measure your blood glucose level. This blood test is usually done while you are having symptoms. Your health care provider may also do a physical exam and review your medical history.If you have diabetes, follow your diabetes management plan as told by your health care provider.Contact your health care provider if you have problems keeping your blood glucose in your target range.

## 2023-03-23 NOTE — ED PROVIDER NOTE - PATIENT PORTAL LINK FT
You can access the FollowMyHealth Patient Portal offered by [facility] by registering at the following website: [email or web address]. By joining [employer]’s FollowMyHealth portal, you will also be able to view your health information using other applications (apps) compatible with our system.

## 2023-03-23 NOTE — ED PROVIDER NOTE - CLINICAL SUMMARY MEDICAL DECISION MAKING FREE TEXT BOX
37 Y female presents emergency department for hyperglycemia.  Patient had labs to ensure she is not DKA which were normal.  Vital signs within normal limits.  DC home

## 2023-03-23 NOTE — ED ADULT TRIAGE NOTE - CHIEF COMPLAINT QUOTE
pt BIBEMS from [facility] residence for hypergylcemia, pt has insulin pump on right abdomen, FS is 443 in triage

## 2023-03-23 NOTE — ED ADULT NURSE NOTE - CHIEF COMPLAINT QUOTE
pt BIBEMS from [facility] residence for hypergylcemia, pt has insulin pump on right abdomen, FS is 443 in triage

## 2023-03-23 NOTE — ED PROVIDER NOTE - OBJECTIVE STATEMENT
36 yo F with PMHx of DM I, diabetic neuropathy, HTN, tachycardia, neuropathy, chronic back pain, migraine headaches, IBS, anxiety, depression, GERD, R hand tremor, and pseudoseizures presents to the ED c/o elevated blood sugar x few days. Pt has been taking her insulin as directed but sugar remains elevated. She denies other complaints. Pt denies fever, chills, nausea, vomiting, abdominal pain, diarrhea, headache, dizziness, weakness, chest pain, SOB, back pain, LOC, trauma, urinary symptoms, cough.

## 2023-03-23 NOTE — ED ADULT NURSE NOTE - OBJECTIVE STATEMENT
[facility], pt has insulin pump on right abdomen, FS is 443 in triage, pt c/o nausea, no vomiting

## 2023-03-23 NOTE — ED PROVIDER NOTE - MDM ORDERS SUBMITTED SELECTION
Your Child's Health             [age]-Year-Old Visit          Jessie Dsouza  January 22, 2018    Visit Vitals  Temp 97.4 °F (36.3 °C) (Temporal Artery)   Ht 35\" (88.9 cm)   Wt 11.4 kg   HC 47.6 cm (18.74\")   BMI 14.40 kg/m²     Weight: 25.09 lbs    NUTRITION:  Emphasize variety of foods over quantity.  Many children at this age resist meats and vegetables.  Ask relatives and friends for tips.  Parent magazines often have useful suggestions.  Children should drink between 16 and 24 ounces of milk per day for growth of bones and teeth.      Obesity and being overweight are serious problems in the [place].  You can help your child avoid these problems by following these guidelines:  1. Limit TV and video total time to 2 hours per day or less.  2. Don't encourage your children to eat if they tell you they are full.  Healthy children will not starve themselves.  3. Don't reward your children for cleaning their plates.  If they always leave food, reduce the size of the portions and tell them to ask for more if they are still hungry.  4. Don't allow children to dish out their own portions.  They will imitate adults or imitate what they have seen on TV; in both cases, the amount will be too large.  This results in increased calories and waste.  At least for children above 5 years of age and for adults, people eat more when given larger portions.    DENTAL CARE:  All children should brush their teeth twice a day.  Use a pea-sized lump of fluoride toothpaste and a soft toothbrush.  If Jessie swallows a little of the toothpaste, it will cause no harm; however, you should not let her eat paste from the tube.  Excess fluoride can cause spotted teeth.    DEVELOPMENT:  A two-year-old child can most often wash and dry his or her hands, help with simple tasks, and put on some clothes.  A rapidly increasing vocabulary generally exists along with only a 5-10 minute attention span.  The short attention span allows you to  distract Jessie out of impending tantrums.  Two-year-old children have a tendency to walk into things, to run too fast, and to have no fear of heights or danger.    Listen to Jessie and read to her often. This will help with language development.  Children who have been read to have higher grades all the way through school.    INDEPENDENCE:  The more independent a child can become, the more self-esteem will result.  Never do for a child what the child is capable of doing alone.  It may take you longer to do a job with \"help\" from Jessie, but otherwise she may grow up not knowing how to cook or clean.  You might think that Jessie would be grateful for your cooking and cleaning, but if you do everything for her, she will come to regard you as a servant and lose respect for you.  Help Jessie learn to dress and play and do simple household tasks.  At this age, she may actually enjoy doing these things.    TELEVISION:  Be careful what your child may see when you are watching television.  Scary adult content can lead to sleep and behavior problems.  Limit television and video to wholesome programs, and permit no more than two hours of viewing per day.  Excess television viewing is related to overweight problems in children.  Expect Jessie to be upset if the children in the TV show she is watching are upset.  Children cannot distinguish commercials from the program's content until they are 6-8 years old.  They are very susceptible to advertising, and TV will increase their demands on you for toys and junk food.    TOILET TRAINING:  Please keep in mind the following factors:  1. Most children are physically ready to begin toilet training at 18 months.  2. The average age for girls in the [place] to be trained is almost 3 years; boys are generally trained when they are a little older.  3. Toilet training should be a positive experience if possible.  If Jessie is resistant at the first attempt, it is best to forget about if for  a few months and then try again.  A child too small for the toilet seat may need a potty chair or may need to grow a little before beginning on the adult toilet.    DISCIPLINE:  Be as consistent as possible with discipline.  Think of it as teaching where repetition is necessary for learning.  If disrespectful language and hitting are difficult now, think of how hard it will be if you allow Jessie to go untrained until the teens.  Jessie craves your attention, so give her more attention for good behavior and less attention (for example, time-outs) for bad behavior.  You can give a time-out to a two-year-old by simply not looking at or talking to the child for 2-5 minutes, although a \"naughty chair\" also works very well for some children.    ACCIDENT PREVENTION:  1. Car Safety:  An approved car seat in the back seat of the car is required by [place] law until Jessie is four years old and weighs at least 40 pounds.  Please consider using one of the many free programs that check to make sure the seat is properly installed.  2. Street and Garage Safety: Children at this age can undo door latches. There have been some tragic accidents involving children who were found out in the snow or who ran out the door and ended up under the wheel of a car backing out of the driveway.  3. Pets: Two-year-old children don't know how to treat pets.  This is the most common age for dog bites  Eventually, children need to be taught not to hand-feed dogs and not to play with them when they are eating or sleeping.    SMOKING:  Children exposed to tobacco smoke have more ear infections and pneumonia. Cold symptoms last longer. If you smoke, please quit.  If you cannot quit, smoke outside. Do not smoke near Jessie, and do not let others smoke near her.    LEAD EXPOSURE:  If there is lead in the house, Jessie may be vulnerable.  Let us know if any of the following apply:  1. Your home was built before 1960 and has peeling or chipping or  chalking paint.  Homes built in older cities at the turn of the last century may have lead pipes.  Check to see if any of the above applies to Jessie's sitter's home, , , or any other house where Jessie spends time.  2. You may have other sources of lead in the home, including:  (a) herbal remedies like nery, sherin, ghasard, kandu, azarcon, pay-loo-ah, or balagoli; (b) antique toys, especially those made of lead or pewter; (c) imported mini blinds; and (d) imported canned goods, especially acid foods like tomato and citrus.  Do not cook or store foods in utensils made of crystal, pewter, or imported pottery.  3. You have another child or housemate who is being followed or treated for an elevated lead level.  4. Jessie lives with an adult whose job or hobby involves lead exposure (soldering, battery manufacture, recycling, casting, stained glass, etc.).  5. You live near an active lead smelter, a battery recycling plant, or a plant that processes hot metal.    TUBERCULOSIS:  There is such a low rate of tuberculosis in our area that frequent skin tests are no longer recommended.  However, certain situations do increase Jessie's risk, including contact with AIDS patients, nursing home residents, prisoners, immigrants, or homeless persons.  Please let us know if Jessie has contacts with such persons, or if she has contact with anyone known to have or suspected of having tuberculosis.    SUN EXPOSURE:  If you plan to have Jessie outside for more than 30 minutes, please follow the guidelines in our Sun Exposure handout.    MEDICATION FOR FEVER OR PAIN:   Acetaminophen liquid (e.g., Tylenol or Tempra) may be given every four hours as needed for pain or fever.  Acetaminophen liquid is less concentrated than the infant dropper bottle type.  Be sure to check which product CONCENTRATION you are using.    CHILDREN’S Tylenol/Acetaminophen  (160 MG/5 mL)    Child’s Weight: Dose:  24 - 35 pounds:   160 mg (5.0 mL  (1 Teaspoon))    CHILDREN’S Tylenol/Acetaminophen MELTAWAYS ( 80 MG tablets)    Child’s Weight:  Dose:  24 - 35 pounds:    160 mg (2 meltaway tablets)    CHILDREN'S Ibuprofen liquid (100MG/5mL) (e.g., Advil or Motrin) may be given every six hours as needed for pain or fever. Please check the concentration of your ibuprofen to be sure you are giving the correct amount.      Child’s Weight:  Dose:  24 - 35 pounds:    100 mg   (1 Teaspoon)    If Jessie is outside this weight range, call your pediatrician’s office for advice.    Most Recent Immunizations   Administered Date(s) Administered   • DTaP 04/25/2017   • DTaP/Hep B/IPV 2016   • HIB, Unspecified Formulation 04/25/2017   • Hep A, ped/adol, 2 dose 12/27/2017   • Influenza, injectable, quadrivalent, preservative free, pediatric 12/27/2017   • Influenza, injectable, quadrivalent, preservative-free 01/24/2017   • MMR 01/24/2017   • Pneumococcal Conjugate 13 valent 01/24/2017   • Rotavirus - pentavalent 2016   • Varicella 01/24/2017       If Jessie develops any of the following reactions within 72 hours after an immunization, notify your pediatrician by calling the pediatric phone nurse:  1. A temperature of 105 degrees or above.  2. More than 3 hours of continuous crying.  3. A shrill, high-pitched cry.  4. A pale, limp spell.  5. A seizure or fainting spell.  In this case, you should call 911 or go immediately to the emergency room.    NEXT VISIT:  2 1/2 YEARS OF AGE    Thank you for entrusting your care to [facility].     Medications Labs/Medications

## 2023-03-24 ENCOUNTER — NON-APPOINTMENT (OUTPATIENT)
Age: 38
End: 2023-03-24

## 2023-03-24 ENCOUNTER — APPOINTMENT (OUTPATIENT)
Dept: PSYCHIATRY | Facility: CLINIC | Age: 38
End: 2023-03-24

## 2023-03-24 LAB
CULTURE RESULTS: NO GROWTH — SIGNIFICANT CHANGE UP
SPECIMEN SOURCE: SIGNIFICANT CHANGE UP

## 2023-03-28 ENCOUNTER — NON-APPOINTMENT (OUTPATIENT)
Age: 38
End: 2023-03-28

## 2023-03-28 ENCOUNTER — EMERGENCY (EMERGENCY)
Facility: HOSPITAL | Age: 38
LOS: 0 days | Discharge: HOME | End: 2023-03-28
Attending: EMERGENCY MEDICINE
Payer: MEDICAID

## 2023-03-28 DIAGNOSIS — J06.9 ACUTE UPPER RESPIRATORY INFECTION, UNSPECIFIED: ICD-10-CM

## 2023-03-28 DIAGNOSIS — R07.81 PLEURODYNIA: ICD-10-CM

## 2023-03-28 DIAGNOSIS — R07.0 PAIN IN THROAT: ICD-10-CM

## 2023-03-28 DIAGNOSIS — Z88.1 ALLERGY STATUS TO OTHER ANTIBIOTIC AGENTS STATUS: ICD-10-CM

## 2023-03-28 DIAGNOSIS — Z79.4 LONG TERM (CURRENT) USE OF INSULIN: ICD-10-CM

## 2023-03-28 DIAGNOSIS — H92.02 OTALGIA, LEFT EAR: ICD-10-CM

## 2023-03-28 DIAGNOSIS — Z88.0 ALLERGY STATUS TO PENICILLIN: ICD-10-CM

## 2023-03-28 DIAGNOSIS — Z20.822 CONTACT WITH AND (SUSPECTED) EXPOSURE TO COVID-19: ICD-10-CM

## 2023-03-28 DIAGNOSIS — Z79.82 LONG TERM (CURRENT) USE OF ASPIRIN: ICD-10-CM

## 2023-03-28 PROCEDURE — 99284 EMERGENCY DEPT VISIT MOD MDM: CPT

## 2023-03-28 PROCEDURE — 0241U: CPT

## 2023-03-28 PROCEDURE — 99283 EMERGENCY DEPT VISIT LOW MDM: CPT

## 2023-03-28 NOTE — ED PROVIDER NOTE - NSFOLLOWUPINSTRUCTIONS_ED_ALL_ED_FT
Upper Respiratory Infection  An upper respiratory infection (URI) is a common viral infection of the nose, throat, and upper air passages that lead to the lungs. The most common type of URI is the common cold. URIs usually get better on their own, without medical treatment.    What are the causes?  A URI is caused by a virus. You may catch a virus by:    Breathing in droplets from an infected person's cough or sneeze.  Touching something that has been exposed to the virus (contaminated) and then touching your mouth, nose, or eyes.    What increases the risk?  You are more likely to get a URI if:    You are very young or very old.  It is dylan or winter.  You have close contact with others, such as at a , school, or health care facility.  You smoke.  You have long-term (chronic) heart or lung disease.  You have a weakened disease-fighting (immune) system.  You have nasal allergies or asthma.  You are experiencing a lot of stress.  You work in an area that has poor air circulation.  You have poor nutrition.    What are the signs or symptoms?  A URI usually involves some of the following symptoms:    Runny or stuffy (congested) nose.  Sneezing.  Cough.  Sore throat.  Headache.  Fatigue.  Fever.  Loss of appetite.  Pain in your forehead, behind your eyes, and over your cheekbones (sinus pain).  Muscle aches.  Redness or irritation of the eyes.  Pressure in the ears or face.    How is this diagnosed?  This condition may be diagnosed based on your medical history and symptoms, and a physical exam. Your health care provider may use a cotton swab to take a mucus sample from your nose (nasal swab). This sample can be tested to determine what virus is causing the illness.    How is this treated?  URIs usually get better on their own within 7–10 days. You can take steps at home to relieve your symptoms. Medicines cannot cure URIs, but your health care provider may recommend certain medicines to help relieve symptoms, such as:    Over-the-counter cold medicines.  Cough suppressants. Coughing is a type of defense against infection that helps to clear the respiratory system, so take these medicines only as recommended by your health care provider.  Fever-reducing medicines.    Follow these instructions at home:  Activity     Rest as needed.  If you have a fever, stay home from work or school until your fever is gone or until your health care provider says you are no longer contagious. Your health care provider may have you wear a face mask to prevent your infection from spreading.  Relieving symptoms     Gargle with a salt-water mixture 3–4 times a day or as needed. To make a salt-water mixture, completely dissolve ½–1 tsp of salt in 1 cup of warm water.  Use a cool-mist humidifier to add moisture to the air. This can help you breathe more easily.  Eating and drinking     Drink enough fluid to keep your urine pale yellow.  ImageEat soups and other clear broths.  General instructions     Take over-the-counter and prescription medicines only as told by your health care provider. These include cold medicines, fever reducers, and cough suppressants.  Do not use any products that contain nicotine or tobacco, such as cigarettes and e-cigarettes. If you need help quitting, ask your health care provider.   Stay away from secondhand smoke.  Stay up to date on all immunizations, including the yearly (annual) flu vaccine.  ImageKeep all follow-up visits as told by your health care provider. This is important.  How to prevent the spread of infection to others     ImageURIs can be passed from person to person (are contagious). To prevent the infection from spreading:    Wash your hands often with soap and water. If soap and water are not available, use hand .  Avoid touching your mouth, face, eyes, or nose.  Cough or sneeze into a tissue or your sleeve or elbow instead of into your hand or into the air.    Contact a health care provider if:  You are getting worse instead of better.  You have a fever or chills.  Your mucus is brown or red.  You have yellow or brown discharge coming from your nose.  You have pain in your face, especially when you bend forward.  You have swollen neck glands.  You have pain while swallowing.  You have white areas in the back of your throat.  Get help right away if:  You have shortness of breath that gets worse.  You have severe or persistent:    Headache.  Ear pain.  Sinus pain.  Chest pain.    You have chronic lung disease along with any of the following:    Wheezing.  Prolonged cough.  Coughing up blood.  A change in your usual mucus.    You have a stiff neck.  You have changes in your:    Vision.  Hearing.  Thinking.  Mood.    Summary  An upper respiratory infection (URI) is a common infection of the nose, throat, and upper air passages that lead to the lungs.  A URI is caused by a virus.  URIs usually get better on their own within 7–10 days.  Medicines cannot cure URIs, but your health care provider may recommend certain medicines to help relieve symptoms.  This information is not intended to replace advice given to you by your health care provider. Make sure you discuss any questions you have with your health care provider.

## 2023-03-28 NOTE — ED PROVIDER NOTE - CLINICAL SUMMARY MEDICAL DECISION MAKING FREE TEXT BOX
Patient here with throat,  ear, and left-sided pain with exam unremarkable.  Patient stable for discharge suspect viral URI.

## 2023-03-28 NOTE — ED PROVIDER NOTE - ATTENDING CONTRIBUTION TO CARE
Recent female here evaluation of ear pain, throat pain left-sided rib/abdominal pain for past 3 days.  No fever chills.  Agree with above exam  Impression  Patient here with throat,  ear, and left-sided pain with exam unremarkable.  Patient stable for discharge suspect viral URI.

## 2023-03-29 ENCOUNTER — NON-APPOINTMENT (OUTPATIENT)
Age: 38
End: 2023-03-29

## 2023-03-29 LAB — GLUCOSE BLDC GLUCOMTR-MCNC: 336 MG/DL — HIGH (ref 70–99)

## 2023-03-31 ENCOUNTER — APPOINTMENT (OUTPATIENT)
Dept: PSYCHIATRY | Facility: CLINIC | Age: 38
End: 2023-03-31

## 2023-03-31 VITALS — HEIGHT: 64 IN

## 2023-04-02 ENCOUNTER — EMERGENCY (EMERGENCY)
Facility: HOSPITAL | Age: 38
LOS: 0 days | Discharge: HOME | End: 2023-04-02
Attending: EMERGENCY MEDICINE
Payer: MEDICAID

## 2023-04-02 VITALS
OXYGEN SATURATION: 100 % | WEIGHT: 186.95 LBS | TEMPERATURE: 99 F | RESPIRATION RATE: 20 BRPM | HEART RATE: 109 BPM | HEIGHT: 64 IN | SYSTOLIC BLOOD PRESSURE: 140 MMHG | DIASTOLIC BLOOD PRESSURE: 83 MMHG

## 2023-04-02 VITALS
SYSTOLIC BLOOD PRESSURE: 131 MMHG | HEART RATE: 101 BPM | TEMPERATURE: 98 F | HEIGHT: 64 IN | DIASTOLIC BLOOD PRESSURE: 74 MMHG | OXYGEN SATURATION: 99 % | WEIGHT: 169.98 LBS | RESPIRATION RATE: 18 BRPM

## 2023-04-02 DIAGNOSIS — R06.2 WHEEZING: ICD-10-CM

## 2023-04-02 DIAGNOSIS — R05.9 COUGH, UNSPECIFIED: ICD-10-CM

## 2023-04-02 DIAGNOSIS — Z79.82 LONG TERM (CURRENT) USE OF ASPIRIN: ICD-10-CM

## 2023-04-02 DIAGNOSIS — J02.9 ACUTE PHARYNGITIS, UNSPECIFIED: ICD-10-CM

## 2023-04-02 DIAGNOSIS — Z96.41 PRESENCE OF INSULIN PUMP (EXTERNAL) (INTERNAL): ICD-10-CM

## 2023-04-02 DIAGNOSIS — I10 ESSENTIAL (PRIMARY) HYPERTENSION: ICD-10-CM

## 2023-04-02 DIAGNOSIS — E10.40 TYPE 1 DIABETES MELLITUS WITH DIABETIC NEUROPATHY, UNSPECIFIED: ICD-10-CM

## 2023-04-02 DIAGNOSIS — K21.9 GASTRO-ESOPHAGEAL REFLUX DISEASE WITHOUT ESOPHAGITIS: ICD-10-CM

## 2023-04-02 DIAGNOSIS — G43.909 MIGRAINE, UNSPECIFIED, NOT INTRACTABLE, WITHOUT STATUS MIGRAINOSUS: ICD-10-CM

## 2023-04-02 DIAGNOSIS — F32.A DEPRESSION, UNSPECIFIED: ICD-10-CM

## 2023-04-02 DIAGNOSIS — R00.0 TACHYCARDIA, UNSPECIFIED: ICD-10-CM

## 2023-04-02 DIAGNOSIS — Z88.1 ALLERGY STATUS TO OTHER ANTIBIOTIC AGENTS STATUS: ICD-10-CM

## 2023-04-02 DIAGNOSIS — Z88.7 ALLERGY STATUS TO SERUM AND VACCINE: ICD-10-CM

## 2023-04-02 DIAGNOSIS — Z87.19 PERSONAL HISTORY OF OTHER DISEASES OF THE DIGESTIVE SYSTEM: ICD-10-CM

## 2023-04-02 DIAGNOSIS — Z88.0 ALLERGY STATUS TO PENICILLIN: ICD-10-CM

## 2023-04-02 DIAGNOSIS — F41.9 ANXIETY DISORDER, UNSPECIFIED: ICD-10-CM

## 2023-04-02 DIAGNOSIS — M54.9 DORSALGIA, UNSPECIFIED: ICD-10-CM

## 2023-04-02 DIAGNOSIS — G89.29 OTHER CHRONIC PAIN: ICD-10-CM

## 2023-04-02 PROCEDURE — 99284 EMERGENCY DEPT VISIT MOD MDM: CPT

## 2023-04-02 PROCEDURE — 99283 EMERGENCY DEPT VISIT LOW MDM: CPT

## 2023-04-02 PROCEDURE — 94640 AIRWAY INHALATION TREATMENT: CPT

## 2023-04-02 PROCEDURE — 93010 ELECTROCARDIOGRAM REPORT: CPT

## 2023-04-02 PROCEDURE — 82962 GLUCOSE BLOOD TEST: CPT

## 2023-04-02 PROCEDURE — 71046 X-RAY EXAM CHEST 2 VIEWS: CPT | Mod: 26

## 2023-04-02 PROCEDURE — 71046 X-RAY EXAM CHEST 2 VIEWS: CPT

## 2023-04-02 PROCEDURE — 99283 EMERGENCY DEPT VISIT LOW MDM: CPT | Mod: 25

## 2023-04-02 PROCEDURE — 93005 ELECTROCARDIOGRAM TRACING: CPT

## 2023-04-02 RX ORDER — ALBUTEROL 90 UG/1
2 AEROSOL, METERED ORAL EVERY 6 HOURS
Refills: 0 | Status: DISCONTINUED | OUTPATIENT
Start: 2023-04-02 | End: 2023-04-02

## 2023-04-02 RX ORDER — FAMOTIDINE 10 MG/ML
20 INJECTION INTRAVENOUS ONCE
Refills: 0 | Status: COMPLETED | OUTPATIENT
Start: 2023-04-02 | End: 2023-04-02

## 2023-04-02 RX ORDER — ALBUTEROL 90 UG/1
1 AEROSOL, METERED ORAL ONCE
Refills: 0 | Status: COMPLETED | OUTPATIENT
Start: 2023-04-02 | End: 2023-04-02

## 2023-04-02 RX ORDER — DIPHENHYDRAMINE HYDROCHLORIDE AND LIDOCAINE HYDROCHLORIDE AND ALUMINUM HYDROXIDE AND MAGNESIUM HYDRO
30 KIT ONCE
Refills: 0 | Status: COMPLETED | OUTPATIENT
Start: 2023-04-02 | End: 2023-04-02

## 2023-04-02 RX ORDER — DEXAMETHASONE 0.5 MG/5ML
10 ELIXIR ORAL ONCE
Refills: 0 | Status: COMPLETED | OUTPATIENT
Start: 2023-04-02 | End: 2023-04-02

## 2023-04-02 RX ADMIN — Medication 100 MILLIGRAM(S): at 21:21

## 2023-04-02 RX ADMIN — DIPHENHYDRAMINE HYDROCHLORIDE AND LIDOCAINE HYDROCHLORIDE AND ALUMINUM HYDROXIDE AND MAGNESIUM HYDRO 30 MILLILITER(S): KIT at 20:58

## 2023-04-02 RX ADMIN — ALBUTEROL 1 PUFF(S): 90 AEROSOL, METERED ORAL at 18:28

## 2023-04-02 RX ADMIN — Medication 10 MILLIGRAM(S): at 21:24

## 2023-04-02 RX ADMIN — FAMOTIDINE 20 MILLIGRAM(S): 10 INJECTION INTRAVENOUS at 20:57

## 2023-04-02 NOTE — ED PROVIDER NOTE - ATTENDING APP SHARED VISIT CONTRIBUTION OF CARE
37-year-old female here for cough for the past 5 days sore throat x2 days no fever chills shortness of breath.  Patient reports that they had wheezing by EMS prior to arrival.  He on exam patient no distress normal speech normal posterior oropharynx S1-S2 clear to auscultation bilaterally mild decrease in air movement bilaterally soft nontender neurologic grossly intact  Impression  Patient with cough x5 days as well sore throat x2 days.  Patient treated for bronchitis versus pneumonia with azithromycin and albuterol pump.  Patient agreeable plan discharge home.

## 2023-04-02 NOTE — ED ADULT NURSE NOTE - OBJECTIVE STATEMENT
" I still have pain in my chest jaciel when I cough." Was just discharged few minutes ago with same complaints, ABT prescribed awaiting transport to [facility]. As per mom probably having anxiety now because she was upset and they didn't get x-ray result.

## 2023-04-02 NOTE — ED PROVIDER NOTE - CLINICAL SUMMARY MEDICAL DECISION MAKING FREE TEXT BOX
Patient with cough x5 days as well sore throat x2 days.  Patient treated for bronchitis versus pneumonia with azithromycin and albuterol pump.  Patient agreeable plan discharge home.

## 2023-04-02 NOTE — ED PROVIDER NOTE - PATIENT PORTAL LINK FT
You can access the FollowMyHealth Patient Portal offered by [facility] by registering at the following website: [email or web address]. By joining [facility]’s FollowMyHealth portal, you will also be able to view your health information using other applications (apps) compatible with our system.

## 2023-04-02 NOTE — ED PROVIDER NOTE - PHYSICAL EXAMINATION
CONSTITUTIONAL: Well-developed; well-nourished; in no acute distress.   SKIN: warm, dry  HEAD: Normocephalic; atraumatic.  EYES: PERRL, EOMI, no conjunctival erythema  ENT: No nasal discharge; airway clear.  NECK: Supple; non tender.  CARD: S1, S2 normal; Regular rate and rhythm.   RESP: No wheezes, rales or rhonchi.  ABD: soft ntnd  EXT: Normal ROM.  No clubbing, cyanosis or edema.   NEURO: Alert, oriented, grossly unremarkable  PSYCH: Cooperative, appropriate.

## 2023-04-02 NOTE — ED PROVIDER NOTE - ATTENDING CONTRIBUTION TO CARE
37-year-old female past medical history of asthma as a child, hypertension, neuropathy, pseudoseizures, anxiety presents with worsening cough.  Patient had some sore throat and coughing throughout the week.  No fevers or chills.  No congestion.  No vomiting or diarrhea.  Positive nausea.  Patient was found to have seen by EMS prior to arrival.  Was treated in the ED earlier with with albuterol and a chest x-ray.  Discharged with antibiotics.  While leaving patient states that her symptoms worsen.  Worsening cough and burning sensation.  Patient be registered.     CONSTITUTIONAL: Well-developed; well-nourished; in no acute distress.   SKIN: warm, dry  HEAD: Normocephalic; atraumatic.  EYES: PERRL, EOMI, no conjunctival erythema  ENT: No nasal discharge; airway clear.  NECK: Supple; non tender.  CARD: S1, S2 normal;  Regular rate and rhythm.   RESP: No wheezes, rales or rhonchi. no respiratory distress, no retractions.   ABD: soft non tender, non distended, no rebound or guarding  EXT: Normal ROM.  5/5 strength in all 4 extremities   LYMPH: No acute cervical adenopathy.  NEURO: Alert, oriented, grossly unremarkable. neurovascularly intact  PSYCH: Cooperative, appropriate.

## 2023-04-02 NOTE — ED ADULT NURSE NOTE - HIV OFFER
Called to let her know that Cardiac Rehab will be closed on Thurs Feb 3 2022. Will reschedule. Previously Declined (within the last year)

## 2023-04-02 NOTE — ED PROVIDER NOTE - OBJECTIVE STATEMENT
37-year-old female presenting for evaluation of cough and sore throat over the last week.  Symptoms are moderate.  No alleviating or aggravating factors.  No fevers, chills, chest pain or shortness of breath.

## 2023-04-02 NOTE — ED PROVIDER NOTE - NS ED ROS FT
GEN:  no fever, no chills, no generalized weakness  NEURO:  no headache, no dizziness  ENT: +sore throat, no runny nose  CV:  no chest pain, no palpitations, +reflux burning  RESP:  no sob, +cough  GI:  no nausea, no vomiting, no abdominal pain, no diarrhea  :  no dysuria, no urinary frequency, no hematuria  MSK:  no joint pain, no edema  SKIN:  no rash, no bruising

## 2023-04-02 NOTE — ED PROVIDER NOTE - OBJECTIVE STATEMENT
38 yo female, DM I, diabetic neuropathy, HTN, tachycardia, neuropathy, chronic back pain, migraine headaches, IBS, anxiety, depression, GERD, R hand tremor, and pseudoseizures, presents with cough x2 days, no alleviating or aggravating factors, now associated with a burning reflux sensation. Denies fevers, chills, nausea, vomiting, abdominal pain, headache, dizziness, chest pain, shortness of breath.

## 2023-04-02 NOTE — ED PROVIDER NOTE - CLINICAL SUMMARY MEDICAL DECISION MAKING FREE TEXT BOX
Chris presents with worsening cough over the last week. Patient had cxr and discharged with albuterol and abx. Worsening cough so re-registred as a patient. Lungs are clear, no respiratory distress. medications given for cough. Results discussed. Discharged with pmd follow up and return precautions.

## 2023-04-02 NOTE — ED ADULT NURSE NOTE - CHIEF COMPLAINT QUOTE
" I still have pain in my chest jaciel when I cough." Was just discharged few minutes ago with same complaints, ABT prescribed awaiting transport to [facility]. As per mom probably having anxiety now because she was upset and they didn't get x-ray result.

## 2023-04-02 NOTE — ED PROVIDER NOTE - PATIENT PORTAL LINK FT
You can access the FollowMyHealth Patient Portal offered by [facility] by registering at the following website: [email or web address]. By joining [facility]’s FollowMyHealth portal, you will also be able to view your health information using other applications (apps) compatible with our system.

## 2023-04-02 NOTE — ED ADULT NURSE NOTE - OBJECTIVE STATEMENT
Patient presents to ED for c/o cough. Patient c/o cough and sore throat x2 days. Patient denies pain/discomfort. Patient A&O x4.

## 2023-04-02 NOTE — ED ADULT TRIAGE NOTE - CHIEF COMPLAINT QUOTE
" I still have pain in my chest jaciel when I cough." Was just discharged few minutes ago with same complaints " I still have pain in my chest jaciel when I cough." Was just discharged few minutes ago with same complaints. As per mom probably having anxiety now. " I still have pain in my chest jaciel when I cough." Was just discharged few minutes ago with same complaints awaiting transport to [facility]. As per mom probably having anxiety now. " I still have pain in my chest jacile when I cough." Was just discharged few minutes ago with same complaints, ABT prescribed awaiting transport to [facility]. As per mom probably having anxiety now because she was upset and they didn't get x-ray result.

## 2023-04-02 NOTE — ED PROVIDER NOTE - CARE PROVIDER_API CALL
Martha Robertson)  Internal Medicine  [address] [place] [id number]  Phone: [phone]  Fax: [phone]  Follow Up Time: 1-3 Days

## 2023-04-04 ENCOUNTER — OUTPATIENT (OUTPATIENT)
Dept: OUTPATIENT SERVICES | Facility: HOSPITAL | Age: 38
LOS: 1 days | End: 2023-04-04
Payer: MEDICAID

## 2023-04-04 ENCOUNTER — APPOINTMENT (OUTPATIENT)
Dept: NUTRITION | Facility: CLINIC | Age: 38
End: 2023-04-04

## 2023-04-04 DIAGNOSIS — E11.9 TYPE 2 DIABETES MELLITUS WITHOUT COMPLICATIONS: ICD-10-CM

## 2023-04-04 PROCEDURE — G0108: CPT

## 2023-04-05 ENCOUNTER — EMERGENCY (EMERGENCY)
Facility: HOSPITAL | Age: 38
LOS: 0 days | Discharge: HOME | End: 2023-04-05
Attending: EMERGENCY MEDICINE
Payer: MEDICAID

## 2023-04-05 VITALS
TEMPERATURE: 97 F | HEART RATE: 98 BPM | SYSTOLIC BLOOD PRESSURE: 147 MMHG | WEIGHT: 187.39 LBS | DIASTOLIC BLOOD PRESSURE: 83 MMHG | OXYGEN SATURATION: 97 % | HEIGHT: 64 IN | RESPIRATION RATE: 16 BRPM

## 2023-04-05 DIAGNOSIS — F32.5 MAJOR DEPRESSIVE DISORDER, SINGLE EPISODE, IN FULL REMISSION: ICD-10-CM

## 2023-04-05 DIAGNOSIS — Z88.8 ALLERGY STATUS TO OTHER DRUGS, MEDICAMENTS AND BIOLOGICAL SUBSTANCES: ICD-10-CM

## 2023-04-05 DIAGNOSIS — M51.34 OTHER INTERVERTEBRAL DISC DEGENERATION, THORACIC REGION: ICD-10-CM

## 2023-04-05 DIAGNOSIS — Z79.82 LONG TERM (CURRENT) USE OF ASPIRIN: ICD-10-CM

## 2023-04-05 DIAGNOSIS — Z88.0 ALLERGY STATUS TO PENICILLIN: ICD-10-CM

## 2023-04-05 DIAGNOSIS — R05.9 COUGH, UNSPECIFIED: ICD-10-CM

## 2023-04-05 DIAGNOSIS — Z88.1 ALLERGY STATUS TO OTHER ANTIBIOTIC AGENTS STATUS: ICD-10-CM

## 2023-04-05 DIAGNOSIS — F41.9 ANXIETY DISORDER, UNSPECIFIED: ICD-10-CM

## 2023-04-05 DIAGNOSIS — K21.9 GASTRO-ESOPHAGEAL REFLUX DISEASE WITHOUT ESOPHAGITIS: ICD-10-CM

## 2023-04-05 DIAGNOSIS — M48.00 SPINAL STENOSIS, SITE UNSPECIFIED: ICD-10-CM

## 2023-04-05 DIAGNOSIS — R56.9 UNSPECIFIED CONVULSIONS: ICD-10-CM

## 2023-04-05 DIAGNOSIS — E10.9 TYPE 1 DIABETES MELLITUS WITHOUT COMPLICATIONS: ICD-10-CM

## 2023-04-05 DIAGNOSIS — Z88.7 ALLERGY STATUS TO SERUM AND VACCINE: ICD-10-CM

## 2023-04-05 LAB
ALBUMIN SERPL ELPH-MCNC: 3.9 G/DL — SIGNIFICANT CHANGE UP (ref 3.5–5.2)
ALP SERPL-CCNC: 139 U/L — HIGH (ref 30–115)
ALT FLD-CCNC: 36 U/L — SIGNIFICANT CHANGE UP (ref 0–41)
ANION GAP SERPL CALC-SCNC: 10 MMOL/L — SIGNIFICANT CHANGE UP (ref 7–14)
AST SERPL-CCNC: 19 U/L — SIGNIFICANT CHANGE UP (ref 0–41)
BASOPHILS # BLD AUTO: 0.07 K/UL — SIGNIFICANT CHANGE UP (ref 0–0.2)
BASOPHILS NFR BLD AUTO: 0.8 % — SIGNIFICANT CHANGE UP (ref 0–1)
BILIRUB DIRECT SERPL-MCNC: <0.2 MG/DL — SIGNIFICANT CHANGE UP (ref 0–0.3)
BILIRUB INDIRECT FLD-MCNC: SIGNIFICANT CHANGE UP MG/DL (ref 0.2–1.2)
BILIRUB SERPL-MCNC: <0.2 MG/DL — SIGNIFICANT CHANGE UP (ref 0.2–1.2)
BUN SERPL-MCNC: 8 MG/DL — LOW (ref 10–20)
CALCIUM SERPL-MCNC: 9.5 MG/DL — SIGNIFICANT CHANGE UP (ref 8.4–10.4)
CHLORIDE SERPL-SCNC: 101 MMOL/L — SIGNIFICANT CHANGE UP (ref 98–110)
CO2 SERPL-SCNC: 25 MMOL/L — SIGNIFICANT CHANGE UP (ref 17–32)
CREAT SERPL-MCNC: 0.5 MG/DL — LOW (ref 0.7–1.5)
EGFR: 124 ML/MIN/1.73M2 — SIGNIFICANT CHANGE UP
EOSINOPHIL # BLD AUTO: 0.09 K/UL — SIGNIFICANT CHANGE UP (ref 0–0.7)
EOSINOPHIL NFR BLD AUTO: 1 % — SIGNIFICANT CHANGE UP (ref 0–8)
GLUCOSE SERPL-MCNC: 140 MG/DL — HIGH (ref 70–99)
HCG SERPL QL: NEGATIVE — SIGNIFICANT CHANGE UP
HCT VFR BLD CALC: 41.1 % — SIGNIFICANT CHANGE UP (ref 37–47)
HGB BLD-MCNC: 12.6 G/DL — SIGNIFICANT CHANGE UP (ref 12–16)
IMM GRANULOCYTES NFR BLD AUTO: 0.6 % — HIGH (ref 0.1–0.3)
LIDOCAIN IGE QN: 12 U/L — SIGNIFICANT CHANGE UP (ref 7–60)
LYMPHOCYTES # BLD AUTO: 2.32 K/UL — SIGNIFICANT CHANGE UP (ref 1.2–3.4)
LYMPHOCYTES # BLD AUTO: 24.9 % — SIGNIFICANT CHANGE UP (ref 20.5–51.1)
MAGNESIUM SERPL-MCNC: 1.8 MG/DL — SIGNIFICANT CHANGE UP (ref 1.8–2.4)
MCHC RBC-ENTMCNC: 25.4 PG — LOW (ref 27–31)
MCHC RBC-ENTMCNC: 30.7 G/DL — LOW (ref 32–37)
MCV RBC AUTO: 82.7 FL — SIGNIFICANT CHANGE UP (ref 81–99)
MONOCYTES # BLD AUTO: 0.6 K/UL — SIGNIFICANT CHANGE UP (ref 0.1–0.6)
MONOCYTES NFR BLD AUTO: 6.4 % — SIGNIFICANT CHANGE UP (ref 1.7–9.3)
NEUTROPHILS # BLD AUTO: 6.18 K/UL — SIGNIFICANT CHANGE UP (ref 1.4–6.5)
NEUTROPHILS NFR BLD AUTO: 66.3 % — SIGNIFICANT CHANGE UP (ref 42.2–75.2)
NRBC # BLD: 0 /100 WBCS — SIGNIFICANT CHANGE UP (ref 0–0)
PLATELET # BLD AUTO: 356 K/UL — SIGNIFICANT CHANGE UP (ref 130–400)
POTASSIUM SERPL-MCNC: 4.7 MMOL/L — SIGNIFICANT CHANGE UP (ref 3.5–5)
POTASSIUM SERPL-SCNC: 4.7 MMOL/L — SIGNIFICANT CHANGE UP (ref 3.5–5)
PROT SERPL-MCNC: 7.5 G/DL — SIGNIFICANT CHANGE UP (ref 6–8)
RBC # BLD: 4.97 M/UL — SIGNIFICANT CHANGE UP (ref 4.2–5.4)
RBC # FLD: 21.3 % — HIGH (ref 11.5–14.5)
SODIUM SERPL-SCNC: 136 MMOL/L — SIGNIFICANT CHANGE UP (ref 135–146)
WBC # BLD: 9.32 K/UL — SIGNIFICANT CHANGE UP (ref 4.8–10.8)
WBC # FLD AUTO: 9.32 K/UL — SIGNIFICANT CHANGE UP (ref 4.8–10.8)

## 2023-04-05 PROCEDURE — 85025 COMPLETE CBC W/AUTO DIFF WBC: CPT

## 2023-04-05 PROCEDURE — 99284 EMERGENCY DEPT VISIT MOD MDM: CPT

## 2023-04-05 PROCEDURE — 80048 BASIC METABOLIC PNL TOTAL CA: CPT

## 2023-04-05 PROCEDURE — 84703 CHORIONIC GONADOTROPIN ASSAY: CPT

## 2023-04-05 PROCEDURE — 36415 COLL VENOUS BLD VENIPUNCTURE: CPT

## 2023-04-05 PROCEDURE — 83690 ASSAY OF LIPASE: CPT

## 2023-04-05 PROCEDURE — 99283 EMERGENCY DEPT VISIT LOW MDM: CPT

## 2023-04-05 PROCEDURE — 83735 ASSAY OF MAGNESIUM: CPT

## 2023-04-05 PROCEDURE — 80076 HEPATIC FUNCTION PANEL: CPT

## 2023-04-05 RX ORDER — SODIUM CHLORIDE 9 MG/ML
1000 INJECTION INTRAMUSCULAR; INTRAVENOUS; SUBCUTANEOUS ONCE
Refills: 0 | Status: COMPLETED | OUTPATIENT
Start: 2023-04-05 | End: 2023-04-05

## 2023-04-05 NOTE — ED PROVIDER NOTE - DIFFERENTIAL DIAGNOSIS
Known pseudoseizures, reportedly the same as prior episodes per mother at bedside. Will r/o electrolyte abnormality/dehydration Differential Diagnosis

## 2023-04-05 NOTE — ED PROVIDER NOTE - OBJECTIVE STATEMENT
37 pmh of pseudoseizures here for 5 episodes of pseudoseziures at home today. Denie sHA dizziness LOC paresthesias slurred speech or facial droop. Pt A+Ox3 in ER. Is currently being tx'd w abx and otc cough meds for URI/cough. Denies N/V/D or weakness

## 2023-04-05 NOTE — ED PROVIDER NOTE - PROGRESS NOTE DETAILS
DC no pseudoseizures while in ED. No medical complaints. BW unremarbkable. Advised to continue abx, avoid otc meds. F/U PMD, psych and phsyciatrist.

## 2023-04-05 NOTE — ED PROVIDER NOTE - PATIENT PORTAL LINK FT
You can access the FollowMyHealth Patient Portal offered by [facility] by registering at the following website: [email or web address]. By joining [employer]’s FollowMyHealth portal, you will also be able to view your health information using other applications (apps) compatible with our system.

## 2023-04-05 NOTE — ED PROVIDER NOTE - CLINICAL SUMMARY MEDICAL DECISION MAKING FREE TEXT BOX
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 No further complaints. Otherwise afebrile, HD stable, neurovascularly intact, well appearing. Obtained labs which were grossly unremarkable including no significant leukocytosis, anemia, signs of dehydration/CELINA, transaminitis or significant electrolyte abnormalities. Explained to patient and mother at bedside that pseudoseizures do not require emergent intervention and patient will need to follow up outpatient with her psychiatrist. Will provide outpatient information and discharge home. Patient and mother agreeable with plan. Agrees to return to ED for any new or worsening symptoms.

## 2023-04-06 ENCOUNTER — INPATIENT (INPATIENT)
Facility: HOSPITAL | Age: 38
LOS: 7 days | Discharge: HOME | DRG: 756 | End: 2023-04-14
Attending: PSYCHIATRY & NEUROLOGY | Admitting: PSYCHIATRY & NEUROLOGY
Payer: MEDICAID

## 2023-04-06 VITALS
DIASTOLIC BLOOD PRESSURE: 75 MMHG | RESPIRATION RATE: 18 BRPM | HEART RATE: 111 BPM | WEIGHT: 186.95 LBS | SYSTOLIC BLOOD PRESSURE: 141 MMHG | HEIGHT: 64 IN | TEMPERATURE: 96 F

## 2023-04-06 DIAGNOSIS — F60.9 PERSONALITY DISORDER, UNSPECIFIED: ICD-10-CM

## 2023-04-06 DIAGNOSIS — E10.65 TYPE 1 DIABETES MELLITUS WITH HYPERGLYCEMIA: ICD-10-CM

## 2023-04-06 DIAGNOSIS — R45.851 SUICIDAL IDEATIONS: ICD-10-CM

## 2023-04-06 LAB
ALBUMIN SERPL ELPH-MCNC: 4.3 G/DL — SIGNIFICANT CHANGE UP (ref 3.5–5.2)
ALP SERPL-CCNC: 127 U/L — HIGH (ref 30–115)
ALT FLD-CCNC: 38 U/L — SIGNIFICANT CHANGE UP (ref 0–41)
AMPHET UR-MCNC: NEGATIVE — SIGNIFICANT CHANGE UP
ANION GAP SERPL CALC-SCNC: 11 MMOL/L — SIGNIFICANT CHANGE UP (ref 7–14)
APAP SERPL-MCNC: <5 UG/ML — LOW (ref 10–30)
AST SERPL-CCNC: 20 U/L — SIGNIFICANT CHANGE UP (ref 0–41)
BARBITURATES UR SCN-MCNC: NEGATIVE — SIGNIFICANT CHANGE UP
BASOPHILS # BLD AUTO: 0.07 K/UL — SIGNIFICANT CHANGE UP (ref 0–0.2)
BASOPHILS NFR BLD AUTO: 0.6 % — SIGNIFICANT CHANGE UP (ref 0–1)
BENZODIAZ UR-MCNC: NEGATIVE — SIGNIFICANT CHANGE UP
BILIRUB SERPL-MCNC: 0.2 MG/DL — SIGNIFICANT CHANGE UP (ref 0.2–1.2)
BUN SERPL-MCNC: 8 MG/DL — LOW (ref 10–20)
CALCIUM SERPL-MCNC: 9.8 MG/DL — SIGNIFICANT CHANGE UP (ref 8.4–10.5)
CHLORIDE SERPL-SCNC: 100 MMOL/L — SIGNIFICANT CHANGE UP (ref 98–110)
CO2 SERPL-SCNC: 26 MMOL/L — SIGNIFICANT CHANGE UP (ref 17–32)
COCAINE METAB.OTHER UR-MCNC: NEGATIVE — SIGNIFICANT CHANGE UP
CREAT SERPL-MCNC: 0.7 MG/DL — SIGNIFICANT CHANGE UP (ref 0.7–1.5)
DRUG SCREEN 1, URINE RESULT: SIGNIFICANT CHANGE UP
EGFR: 114 ML/MIN/1.73M2 — SIGNIFICANT CHANGE UP
EOSINOPHIL # BLD AUTO: 0.1 K/UL — SIGNIFICANT CHANGE UP (ref 0–0.7)
EOSINOPHIL NFR BLD AUTO: 0.9 % — SIGNIFICANT CHANGE UP (ref 0–8)
ETHANOL SERPL-MCNC: <10 MG/DL — SIGNIFICANT CHANGE UP
FENTANYL UR QL: NEGATIVE — SIGNIFICANT CHANGE UP
GLUCOSE BLDC GLUCOMTR-MCNC: 197 MG/DL — HIGH (ref 70–99)
GLUCOSE BLDC GLUCOMTR-MCNC: 226 MG/DL — HIGH (ref 70–99)
GLUCOSE BLDC GLUCOMTR-MCNC: 291 MG/DL — HIGH (ref 70–99)
GLUCOSE BLDC GLUCOMTR-MCNC: 351 MG/DL — HIGH (ref 70–99)
GLUCOSE BLDC GLUCOMTR-MCNC: 388 MG/DL — HIGH (ref 70–99)
GLUCOSE BLDC GLUCOMTR-MCNC: 438 MG/DL — HIGH (ref 70–99)
GLUCOSE SERPL-MCNC: 235 MG/DL — HIGH (ref 70–99)
HCG SERPL QL: NEGATIVE — SIGNIFICANT CHANGE UP
HCT VFR BLD CALC: 41.3 % — SIGNIFICANT CHANGE UP (ref 37–47)
HGB BLD-MCNC: 12.8 G/DL — SIGNIFICANT CHANGE UP (ref 12–16)
IMM GRANULOCYTES NFR BLD AUTO: 0.4 % — HIGH (ref 0.1–0.3)
LYMPHOCYTES # BLD AUTO: 2.88 K/UL — SIGNIFICANT CHANGE UP (ref 1.2–3.4)
LYMPHOCYTES # BLD AUTO: 24.5 % — SIGNIFICANT CHANGE UP (ref 20.5–51.1)
MCHC RBC-ENTMCNC: 25.3 PG — LOW (ref 27–31)
MCHC RBC-ENTMCNC: 31 G/DL — LOW (ref 32–37)
MCV RBC AUTO: 81.8 FL — SIGNIFICANT CHANGE UP (ref 81–99)
METHADONE UR-MCNC: NEGATIVE — SIGNIFICANT CHANGE UP
MONOCYTES # BLD AUTO: 0.65 K/UL — HIGH (ref 0.1–0.6)
MONOCYTES NFR BLD AUTO: 5.5 % — SIGNIFICANT CHANGE UP (ref 1.7–9.3)
NEUTROPHILS # BLD AUTO: 7.99 K/UL — HIGH (ref 1.4–6.5)
NEUTROPHILS NFR BLD AUTO: 68.1 % — SIGNIFICANT CHANGE UP (ref 42.2–75.2)
NRBC # BLD: 0 /100 WBCS — SIGNIFICANT CHANGE UP (ref 0–0)
OPIATES UR-MCNC: NEGATIVE — SIGNIFICANT CHANGE UP
OXYCODONE UR-MCNC: NEGATIVE — SIGNIFICANT CHANGE UP
PCP UR-MCNC: NEGATIVE — SIGNIFICANT CHANGE UP
PLATELET # BLD AUTO: 457 K/UL — HIGH (ref 130–400)
POTASSIUM SERPL-MCNC: 3.9 MMOL/L — SIGNIFICANT CHANGE UP (ref 3.5–5)
POTASSIUM SERPL-SCNC: 3.9 MMOL/L — SIGNIFICANT CHANGE UP (ref 3.5–5)
PROPOXYPHENE QUALITATIVE URINE RESULT: NEGATIVE — SIGNIFICANT CHANGE UP
PROT SERPL-MCNC: 7.7 G/DL — SIGNIFICANT CHANGE UP (ref 6–8)
RBC # BLD: 5.05 M/UL — SIGNIFICANT CHANGE UP (ref 4.2–5.4)
RBC # FLD: 21.2 % — HIGH (ref 11.5–14.5)
SALICYLATES SERPL-MCNC: <0.3 MG/DL — LOW (ref 4–30)
SARS-COV-2 RNA SPEC QL NAA+PROBE: SIGNIFICANT CHANGE UP
SODIUM SERPL-SCNC: 137 MMOL/L — SIGNIFICANT CHANGE UP (ref 135–146)
THC UR QL: NEGATIVE — SIGNIFICANT CHANGE UP
WBC # BLD: 11.74 K/UL — HIGH (ref 4.8–10.8)
WBC # FLD AUTO: 11.74 K/UL — HIGH (ref 4.8–10.8)

## 2023-04-06 PROCEDURE — U0003: CPT

## 2023-04-06 PROCEDURE — 82947 ASSAY GLUCOSE BLOOD QUANT: CPT

## 2023-04-06 PROCEDURE — 82962 GLUCOSE BLOOD TEST: CPT

## 2023-04-06 PROCEDURE — 87635 SARS-COV-2 COVID-19 AMP PRB: CPT

## 2023-04-06 PROCEDURE — 71046 X-RAY EXAM CHEST 2 VIEWS: CPT | Mod: 26

## 2023-04-06 PROCEDURE — 93010 ELECTROCARDIOGRAM REPORT: CPT

## 2023-04-06 PROCEDURE — 99232 SBSQ HOSP IP/OBS MODERATE 35: CPT

## 2023-04-06 PROCEDURE — 83036 HEMOGLOBIN GLYCOSYLATED A1C: CPT

## 2023-04-06 PROCEDURE — 99285 EMERGENCY DEPT VISIT HI MDM: CPT

## 2023-04-06 PROCEDURE — 97162 PT EVAL MOD COMPLEX 30 MIN: CPT | Mod: GP

## 2023-04-06 PROCEDURE — U0005: CPT

## 2023-04-06 RX ORDER — HYDROXYZINE HCL 10 MG
2 TABLET ORAL
Qty: 0 | Refills: 0 | DISCHARGE

## 2023-04-06 RX ORDER — ESCITALOPRAM OXALATE 10 MG/1
20 TABLET, FILM COATED ORAL DAILY
Refills: 0 | Status: DISCONTINUED | OUTPATIENT
Start: 2023-04-06 | End: 2023-04-14

## 2023-04-06 RX ORDER — SODIUM CHLORIDE 9 MG/ML
1000 INJECTION, SOLUTION INTRAVENOUS
Refills: 0 | Status: DISCONTINUED | OUTPATIENT
Start: 2023-04-06 | End: 2023-04-14

## 2023-04-06 RX ORDER — INSULIN LISPRO 100/ML
VIAL (ML) SUBCUTANEOUS
Refills: 0 | Status: DISCONTINUED | OUTPATIENT
Start: 2023-04-06 | End: 2023-04-14

## 2023-04-06 RX ORDER — FERROUS SULFATE 325(65) MG
325 TABLET ORAL DAILY
Refills: 0 | Status: DISCONTINUED | OUTPATIENT
Start: 2023-04-06 | End: 2023-04-14

## 2023-04-06 RX ORDER — INSULIN GLARGINE 100 [IU]/ML
25 INJECTION, SOLUTION SUBCUTANEOUS AT BEDTIME
Refills: 0 | Status: DISCONTINUED | OUTPATIENT
Start: 2023-04-06 | End: 2023-04-07

## 2023-04-06 RX ORDER — DEXTROSE 50 % IN WATER 50 %
25 SYRINGE (ML) INTRAVENOUS ONCE
Refills: 0 | Status: DISCONTINUED | OUTPATIENT
Start: 2023-04-06 | End: 2023-04-14

## 2023-04-06 RX ORDER — GLUCAGON INJECTION, SOLUTION 0.5 MG/.1ML
1 INJECTION, SOLUTION SUBCUTANEOUS ONCE
Refills: 0 | Status: DISCONTINUED | OUTPATIENT
Start: 2023-04-06 | End: 2023-04-14

## 2023-04-06 RX ORDER — ACETAMINOPHEN 500 MG
650 TABLET ORAL ONCE
Refills: 0 | Status: COMPLETED | OUTPATIENT
Start: 2023-04-06 | End: 2023-04-06

## 2023-04-06 RX ORDER — DEXTROSE 50 % IN WATER 50 %
15 SYRINGE (ML) INTRAVENOUS ONCE
Refills: 0 | Status: DISCONTINUED | OUTPATIENT
Start: 2023-04-06 | End: 2023-04-14

## 2023-04-06 RX ORDER — PANTOPRAZOLE SODIUM 20 MG/1
40 TABLET, DELAYED RELEASE ORAL
Refills: 0 | Status: DISCONTINUED | OUTPATIENT
Start: 2023-04-06 | End: 2023-04-14

## 2023-04-06 RX ORDER — ONDANSETRON 8 MG/1
4 TABLET, FILM COATED ORAL EVERY 8 HOURS
Refills: 0 | Status: DISCONTINUED | OUTPATIENT
Start: 2023-04-06 | End: 2023-04-14

## 2023-04-06 RX ORDER — ALBUTEROL 90 UG/1
1 AEROSOL, METERED ORAL
Refills: 0 | Status: DISCONTINUED | OUTPATIENT
Start: 2023-04-06 | End: 2023-04-14

## 2023-04-06 RX ORDER — INSULIN LISPRO 100/ML
VIAL (ML) SUBCUTANEOUS AT BEDTIME
Refills: 0 | Status: DISCONTINUED | OUTPATIENT
Start: 2023-04-06 | End: 2023-04-14

## 2023-04-06 RX ORDER — DEXTROSE 50 % IN WATER 50 %
12.5 SYRINGE (ML) INTRAVENOUS ONCE
Refills: 0 | Status: DISCONTINUED | OUTPATIENT
Start: 2023-04-06 | End: 2023-04-14

## 2023-04-06 RX ORDER — ONDANSETRON 8 MG/1
1 TABLET, FILM COATED ORAL
Qty: 0 | Refills: 0 | DISCHARGE

## 2023-04-06 RX ORDER — METOPROLOL TARTRATE 50 MG
25 TABLET ORAL
Refills: 0 | Status: DISCONTINUED | OUTPATIENT
Start: 2023-04-06 | End: 2023-04-14

## 2023-04-06 RX ORDER — CLONAZEPAM 1 MG
1 TABLET ORAL
Qty: 0 | Refills: 0 | DISCHARGE

## 2023-04-06 RX ORDER — HYDROXYZINE HCL 10 MG
25 TABLET ORAL ONCE
Refills: 0 | Status: COMPLETED | OUTPATIENT
Start: 2023-04-06 | End: 2023-04-06

## 2023-04-06 RX ORDER — INSULIN LISPRO 100/ML
8 VIAL (ML) SUBCUTANEOUS
Refills: 0 | Status: DISCONTINUED | OUTPATIENT
Start: 2023-04-06 | End: 2023-04-14

## 2023-04-06 RX ADMIN — Medication 8: at 23:02

## 2023-04-06 RX ADMIN — Medication 650 MILLIGRAM(S): at 06:04

## 2023-04-06 RX ADMIN — INSULIN GLARGINE 25 UNIT(S): 100 INJECTION, SOLUTION SUBCUTANEOUS at 21:30

## 2023-04-06 RX ADMIN — Medication 8 UNIT(S): at 16:12

## 2023-04-06 RX ADMIN — ONDANSETRON 4 MILLIGRAM(S): 8 TABLET, FILM COATED ORAL at 20:25

## 2023-04-06 RX ADMIN — Medication 25 MILLIGRAM(S): at 06:04

## 2023-04-06 RX ADMIN — ESCITALOPRAM OXALATE 20 MILLIGRAM(S): 10 TABLET, FILM COATED ORAL at 15:53

## 2023-04-06 RX ADMIN — Medication 25 MILLIGRAM(S): at 20:09

## 2023-04-06 RX ADMIN — Medication 325 MILLIGRAM(S): at 15:53

## 2023-04-06 NOTE — BH INPATIENT PSYCHIATRY ASSESSMENT NOTE - NSBHCONTPROVIDER_PSY_ALL_CORE
Patient Specific Counseling (Will Not Stick From Patient To Patient): PT REPORTS THAT BOTH CLINDAMYCIN TOPICAL AND METRONIDAZOLE TOPICAL HELP HER
Detail Level: Zone
Yes...
Patient Specific Counseling (Will Not Stick From Patient To Patient): Recommended that patient schedule an appt with oculoplastics for complete resolution. Contact information for [facility] given to patient.
Detail Level: Detailed

## 2023-04-06 NOTE — BH INPATIENT PSYCHIATRY ASSESSMENT NOTE - NSTXDEPRESINTERMD_PSY_ALL_CORE
At this time intervention will be mainly focused on creating a validating, supportive environment, and increasing patient insight that there will be no magic pill that will "cure" her, though some may take the edge off of her emotional intensity

## 2023-04-06 NOTE — ED BEHAVIORAL HEALTH ASSESSMENT NOTE - PAST PSYCHOTROPIC MEDICATION
buspar, zyprexa, celexa (latter 2 stopped over 18 years ago reportedly; zyprexa reportedly gave her galactorrhea.)

## 2023-04-06 NOTE — BH PATIENT PROFILE - ALLERGIES
Allergies:-  <Other>  Clindamycin Phosphate  amoxicillin  Ceclor  clindamycin  penicillins    Intolerances:-   Influenza Virus Vaccine, H5N1, Inactivated  Cipro  gabapentin

## 2023-04-06 NOTE — ED BEHAVIORAL HEALTH NOTE - BEHAVIORAL HEALTH NOTE
==================     PRE-HOSPITAL COURSE     ===================     SOURCE: Desmond Garnica RN     DETAILS: Pt was [facility].     ============     ED COURSE     ============     SOURCE: Desmond Garnica RN      ARRIVAL: Pt was [facility].      BELONGINGS: Secured and collected. Pt is in a gown with a 1:1     BEHAVIOR: RN described patient to be calm, cooperative, good eye contact, clear speech, logical thought process, and is AAOx3. RN reported pt is not violent or aggressive. RN reported no marks, bruises, or lacerations on the body. Pt has appropraite grooming and hygiene. RN stated davidn has not endorsed any SI/HI/AVH.      TREATMENT: No medications needed.      VISITORS: Mother is in the waiting room.     ------------------------------------------------     COVID Exposure Screen- collateral (i.e. third-party, chart review, belongings, etc; include EMS and ED staff)     ---------------------------------------------------     1. Has the patient had a COVID-19 test in the last 90 days? Unknown.     2. Has the patient tested positive for COVID-19 antibodies? Unknown.     3.Has the patient received 2 doses of the COVID-19 vaccine?  Unknown.     4. In the past 10 days, has the patient been around anyone with a positive COVID-19 test?* Unknown.     5.Has the patient been out of [place] within the past 10 days? Unknown.

## 2023-04-06 NOTE — ED PROVIDER NOTE - PROGRESS NOTE DETAILS
Carly: upon being made aware of discharge plan patient became agitated, staying she can't live like this anymore and she is having thoughts of harm. No plan, states that she just doesn't want to live like this with her current psychiatrist not helping. I explained that we could try to help get her a new doctor, address her pain, however she became increasingly agitated stating she wasn't going home and if she did she would be suicidal. Though this appears to be reactionary, at this time the patient will cannot be cleared or contract for safety, will medically clear and get pysch eval.

## 2023-04-06 NOTE — BH PATIENT PROFILE - NSPROPTRIGHTNOTIFY_GEN_A_NUR
"  SUBJECTIVE:   Amanda Nails is a 76 year old female who presents to clinic today for the following health issues:      Musculoskeletal problem/pain      Duration: two weeks    Description  Location: started on left side of neck and moved into right side of neck and upper back    Intensity:  Currently:  2/10 sitting here, however, one week ago patient would rate it 8/10.    Accompanying signs and symptoms: none    History  Previous similar problem: no, however, history of lower lumbar issues.  Previous evaluation:  CT    Precipitating or alleviating factors:  Trauma or overuse: no - does hold phone on left side and also carries purse on her left side.  Was at a recent wedding and did enjoy some dancing.  Aggravating factors include: turning neck    Therapies tried and outcome: rest/inactivity, heat and Ibuprofen - helpful          Problem list and histories reviewed & adjusted, as indicated.  Additional history: as documented    Reviewed and updated as needed this visit by clinical staff  Tobacco  Allergies  Med Hx  Surg Hx  Fam Hx  Soc Hx      Reviewed and updated as needed this visit by Provider         ROS:  Constitutional, HEENT, cardiovascular, pulmonary, gi and gu systems are negative, except as otherwise noted.    OBJECTIVE:     /73  Pulse 71  Temp 97.8  F (36.6  C) (Tympanic)  Resp 14  Ht 4' 11\" (1.499 m)  Wt 111 lb 3 oz (50.4 kg)  BMI 22.46 kg/m2  Body mass index is 22.46 kg/(m^2).  GENERAL: healthy, alert and no distress  MS: Neck and upper back - appearance normal. No tenderness along the c-spine. Tender to touch over the upper trap muscle (left greater than right). ROM limited by pain with lateral extention and twisting in both directions. Shoulder ROM normal and painless.      ASSESSMENT/PLAN:       ICD-10-CM    1. Strain of neck muscle, initial encounter S16.1XXA PHYSICAL THERAPY REFERRAL     Recommend ongoing use of heating pad for 15 minutes several times per day.  May try " topical pain-relievers over-the-counter such as aspercreme with lidocaine or icy-hot.  Schedule PT.  Follow up as needed.    The risks, benefits and treatment options of prescribed medications or other treatments have been discussed with the patient. The patient verbalized their understanding and should call or follow up if no improvement or if they develop further problems.    JEMAL [facility]   yes

## 2023-04-06 NOTE — ED BEHAVIORAL HEALTH ASSESSMENT NOTE - OTHER PAST PSYCHIATRIC HISTORY (INCLUDE DETAILS REGARDING ONSET, COURSE OF ILLNESS, INPATIENT/OUTPATIENT TREATMENT)
Problem: Psychosocial  Goal: Patient's level of anxiety will decrease  Outcome: Progressing  Goal: Patient's ability to verbalize feelings about condition will improve  Outcome: Progressing  Goal: Patient's ability to re-evaluate and adapt role responsibilities will improve  Outcome: Progressing  Goal: Patient and family will demonstrate ability to cope with life altering diagnosis and/or procedure  Outcome: Progressing  Goal: Spiritual and cultural needs incorporated into hospitalization  Outcome: Progressing     Problem: Hemodynamics  Goal: Patient's hemodynamics, fluid balance and neurologic status will be stable or improve  Outcome: Progressing     Problem: Respiratory  Goal: Patient will achieve/maintain optimum respiratory ventilation and gas exchange  Outcome: Progressing     Problem: Risk for Aspiration  Goal: Patient's risk for aspiration will be absent or decrease  Outcome: Progressing     Problem: Urinary - Renal Perfusion  Goal: Ability to achieve and maintain adequate renal perfusion and functioning will improve  Outcome: Progressing     Problem: Venous Thromboembolism (VTE) Prevention  Goal: The patient will remain free from venous thromboembolism (VTE)  Outcome: Progressing     Problem: Urinary Elimination  Goal: Establish and maintain regular urinary output  Outcome: Progressing     Problem: Bowel Elimination  Goal: Establish and maintain regular bowel function  Outcome: Progressing     Problem: Communication  Goal: The ability to communicate needs accurately and effectively will improve  Outcome: Progressing     Problem: Discharge Barriers/Planning  Goal: Patient's continuum of care needs are met  Outcome: Progressing     Problem: Fluid Volume  Goal: Fluid volume balance will be maintained  Outcome: Progressing     Problem: Dysphagia  Goal: Dysphagia will improve  Outcome: Progressing     Problem: Mobility  Goal: Patient's capacity to carry out activities will improve  Outcome: Progressing      Problem: Self Care  Goal: Patient will have the ability to perform ADLs independently or with assistance (bathe, groom, dress, toilet and feed)  Outcome: Progressing     Problem: Infection - Standard  Goal: Patient will remain free from infection  Outcome: Progressing     Problem: Wound/ / Incision Healing  Goal: Patient's wound/surgical incision will decrease in size and heals properly  Outcome: Progressing          ARMAAN, Bipolar disorder

## 2023-04-06 NOTE — CHART NOTE - NSCHARTNOTEFT_GEN_A_CORE
pt having shaking episode currently alert and oriented x4 no focal deficits presently.   exam-  a&ox4 diffusely shaking  cv s1 s2 rrr  lungs cta b/l  neuro no focal deficits  pt reports long history of these episodes. Pt states atarax was not helpful in the past. Pt reports episodes usually have to run there course.  d/w wandasch attending request pt be placed on 1 to 1 for safety.  dx-panic diorder/pseudoseizure
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
## 2023-04-06 NOTE — ED BEHAVIORAL HEALTH ASSESSMENT NOTE - SUMMARY
37F who is domiciled at [facility] with mother, unemployed, single, with PMHx of T1DM, Diabetic neuropathy, HTN, chronic back pain, migraine headaches, PPHx of anxiety/depression, 3 prior psychiatric admissions for suicide attempts as a teenager (once by taking a capful of acetone, another by drinking hydrogen peroxide, another attempt by taking half a bottle of ibuprofen), initially presented for evaluation after ongoing pseudoseizures throughout the day today (upwards of 12 per her report), and as she was being discharged, made a suicidal statement, prompting this consultation. Pt presents w/neurocognitive depressive sx, anxiety, as well as SI w/plan to overdose on klonopin, in the context of recent SA about two months ago which she did not disclose to anybody at the time. Pt seeking and is appropriate for psychiatric admission, will be admitted voluntarily. Hold in ED pending bed availability.

## 2023-04-06 NOTE — CHART NOTE - NSCHARTNOTEFT_GEN_A_CORE
.  Called by RN for patient's fingerstick of 438.  Patient only on 8 units of Lispro before meals without a sliding scale.    Will place on Lispro sliding scale TID and at bedtime.    Patient to receive 12 units of Lispro now for FS of 438 and RN to recheck FS in one hour. .  Called by RN for patient's fingerstick of 438.  Patient only on 8 units of Lispro before meals and 25 units of Lantus at bedtime without a sliding scale.     Will place on Lispro sliding scale TID and at bedtime.    Patient to receive 8 units of Lispro now for FS of 438 and RN to recheck FS in one hour.

## 2023-04-06 NOTE — BH INPATIENT PSYCHIATRY ASSESSMENT NOTE - NSBHCHARTREVIEWVS_PSY_A_CORE FT
Vital Signs Last 24 Hrs  T(C): 36.7 (04-06-23 @ 09:18), Max: 36.7 (04-06-23 @ 09:18)  T(F): 98.1 (04-06-23 @ 09:18), Max: 98.1 (04-06-23 @ 09:18)  HR: 111 (04-06-23 @ 09:18) (98 - 112)  BP: 142/87 (04-06-23 @ 09:18) (141/75 - 147/83)  BP(mean): --  RR: 18 (04-06-23 @ 09:18) (16 - 18)  SpO2: 97% (04-05-23 @ 14:12) (97% - 97%)
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
## 2023-04-06 NOTE — BH INPATIENT PSYCHIATRY ASSESSMENT NOTE - CURRENT MEDICATION
MEDICATIONS  (STANDING):  dextrose 5%. 1000 milliLiter(s) (50 mL/Hr) IV Continuous <Continuous>  dextrose 5%. 1000 milliLiter(s) (100 mL/Hr) IV Continuous <Continuous>  dextrose 50% Injectable 25 Gram(s) IV Push once  dextrose 50% Injectable 12.5 Gram(s) IV Push once  dextrose 50% Injectable 25 Gram(s) IV Push once  escitalopram 20 milliGRAM(s) Oral daily  ferrous    sulfate 325 milliGRAM(s) Oral daily  glucagon  Injectable 1 milliGRAM(s) IntraMuscular once  insulin glargine Injectable (LANTUS) 25 Unit(s) SubCutaneous at bedtime  insulin lispro Injectable (ADMELOG) 8 Unit(s) SubCutaneous three times a day before meals  metoprolol tartrate 25 milliGRAM(s) Oral two times a day  pantoprazole    Tablet 40 milliGRAM(s) Oral before breakfast    MEDICATIONS  (PRN):  albuterol    90 MICROgram(s) HFA Inhaler 1 Puff(s) Inhalation four times a day PRN Shortness of Breath  dextrose Oral Gel 15 Gram(s) Oral once PRN Blood Glucose LESS THAN 70 milliGRAM(s)/deciliter  ondansetron    Tablet 4 milliGRAM(s) Oral every 8 hours PRN nausea

## 2023-04-06 NOTE — ED PROVIDER NOTE - OBJECTIVE STATEMENT
38 yo F, hx of pseudoseizure, anxiety, depression, chronic back, chest pain, resting tachycardia -- following with neuro, cardiology, psych -- sp recent echo, EEG, here for assessment.    Patient states she had 5 pseudoseizures today and is now having CP and dyspnea.    No nausea, vomiting, fever, chills, LE pain, edema.
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
## 2023-04-06 NOTE — BH PATIENT PROFILE - FALL HARM RISK - HARM RISK INTERVENTIONS

## 2023-04-06 NOTE — BH INPATIENT PSYCHIATRY ASSESSMENT NOTE - NSBHMETABOLIC_PSY_ALL_CORE_FT
BMI: BMI (kg/m2): 32.1 (04-06-23 @ 09:18)  HbA1c:   Glucose: POCT Blood Glucose.: 226 mg/dL (04-06-23 @ 06:50)    BP: 142/87 (04-06-23 @ 09:18) (141/75 - 144/64)  Lipid Panel:
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
## 2023-04-06 NOTE — ED BEHAVIORAL HEALTH ASSESSMENT NOTE - DETAILS
plan for admission discussed self galactorrhea w/zyprexa Hx of multiple recent familial deaths that have been very distressing to the pt. see HPI
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
## 2023-04-06 NOTE — ED BEHAVIORAL HEALTH ASSESSMENT NOTE - LEGAL HISTORY
CHIEF COMPLAINT:    Chief Complaint   Patient presents with   • Follow-up     ST. Mammotome results Left & Right breast 6-21-19.       HISTORY OF PRESENT ILLNESS:    Adriana Malcolm is a 68 y.o. female who underwent bilateral stereotactic breast biopsies.  She returns today for results.  Both sites showed benign periductal fibrosis with calcifications present.  This was discussed with her today.  She notes minimal soreness from the biopsies.    EXAM:  Vitals:    07/09/19 1355   BP: 132/86   Pulse: 80   Temp: 97.7 °F (36.5 °C)         Minimal bruising bilateral breasts. Small hematoma left breast    ASSESSMENT:    S/p benign bilateral breast biopsies    PLAN:    Will see in three months with repeat mammograms.          This document has been electronically signed by Aurelio Myers MD on July 9, 2019 2:14 PM      
none known

## 2023-04-06 NOTE — BH PATIENT PROFILE - FUNCTIONAL ASSESSMENT - BASIC MOBILITY 6.
4-calculated by average/Not able to assess (calculate score using [facility] averaging method)

## 2023-04-06 NOTE — BH INPATIENT PSYCHIATRY ASSESSMENT NOTE - NSBHASSESSSUMMFT_PSY_ALL_CORE
37F who is domiciled at [facility] with mother, unemployed, single, with PMHx of T1DM, Diabetic neuropathy, HTN, chronic back pain, migraine headaches, PPHx of anxiety/depression, 3 prior psychiatric admissions for suicide attempts as a teenager (once by taking a capful of acetone, another by drinking hydrogen peroxide, another attempt by taking half a bottle of ibuprofen), initially presented for evaluation after ongoing pseudoseizures throughout the day today (upwards of 12 per her report), and as she was being discharged, made a suicidal statement, prompting this consultation. Pt presents w/ anxiety as well as SI w/plan to overdose on klonopin (though this plan is not feasible for patient as she is not prescribed klonopin), in the context of recent SA about two months ago which she did not disclose to anybody at the time. Patient currently admitted voluntarily.    Impression: ARMAAN vs somatic symptoms disorder vs Cluster B traits    #ARMAAN   - Continue Lexapro 20 mg  - Consider changing to another antidepressant (previously trailed on Cymbalta and Celexa)  - Further discuss benzodiazepines with outpatient provider and patient  - Consider starting Prazosin for nightmares    #Diabetes   - DC insulin pump while on inpatient unit  - Lantus 25 at bedtime  - Lispro 8 TID    #HTN  - Metoprolol 25 mg BID    #Anemia  - Ferrous sulfate    #Nausea  - PRN Odansetron

## 2023-04-06 NOTE — BH PATIENT PROFILE - HOME MEDICATIONS
doxycycline hyclate 100 mg oral tablet , 1 tab(s) orally 2 times a day  ondansetron 4 mg oral tablet, disintegrating , 1 tab(s) orally 3 times a day as needed for nausea  Macrobid 100 mg oral capsule , 1 cap(s) orally 2 times a day   ondansetron 4 mg oral disintegrating strip , 1 each orally 3 times a day as needed for nausea  clonazePAM 0.5 mg oral tablet , 1 tab(s) orally once a day (at bedtime) MDD:0.5mg  Azithromycin 5 Day Dose Pack 250 mg oral tablet , 2 tablets day 1  1 tablet days 2-5  acetaminophen 500 mg oral capsule , 1 cap(s) orally every 4 hours   clonazePAM 0.5 mg oral tablet , 1 tab(s) orally 2 times a day MDD:1mg  benzonatate 100 mg oral capsule , 1 cap(s) orally 3 times a day, As Needed -for cough   Senna 8.6 mg oral tablet , 1 tab(s) orally 2 times a day, As Needed for constipation  aspirin 81 mg oral tablet, chewable , 1 tab(s) orally once a day  budesonide-formoterol 160 mcg-4.5 mcg/inh inhalation aerosol , 2 puff(s) inhaled 2 times a day   magnesium oxide 400 mg oral tablet , 1 tab(s) orally once a day   polyethylene glycol 3350 oral powder for reconstitution , 17 gram(s) orally every 12 hours  albuterol 90 mcg/inh inhalation powder , 2 puff(s) inhaled every 6 hours, As Needed  -for bronchospasm   acetaminophen 500 mg oral tablet , 2 tab(s) orally every 8 hours, As Needed  hydrOXYzine hydrochloride 10 mg oral tablet , 2 tab(s) orally once a day (at bedtime)  clonazePAM 0.5 mg oral tablet , 1 tab(s) orally 2 times a day at 8AM and 12 noon  Lexapro 20 mg oral tablet , 1 tab(s) orally once a day  dicyclomine 20 mg oral tablet , 1 tab(s) orally 3 times a day, As Needed for pain  Zofran 4 mg oral tablet , 1 tab(s) orally every 8 hours, As Needed for pain  PriLOSEC 20 mg oral delayed release capsule , 1 cap(s) orally once a day  insulin pump , 1 unit(s) subcutaneous once a day   metoprolol tartrate 25 mg oral tablet , 1 tab(s) orally 2 times a day  amitriptyline 75 mg oral tablet , 1 tab(s) orally once a day (at bedtime)

## 2023-04-06 NOTE — BH INPATIENT PSYCHIATRY ASSESSMENT NOTE - NSBHCONSIPREASONDETAILS_PSY_A_CORE FT
Suicidal ideation with plan to overdose on Klonopin, however she does not have Klonopin prescription at this time and medication dispensed by her residence

## 2023-04-06 NOTE — ED PROVIDER NOTE - PHYSICAL EXAMINATION
VITAL SIGNS: I have reviewed nursing notes and confirm.  CONSTITUTIONAL: Well-developed; well-nourished; in no acute distress.  SKIN: Skin exam is warm and dry, no acute rash.  HEAD: Normocephalic; atraumatic.  EYES: PERRL, EOM intact; conjunctiva and sclera clear.  ENT: No nasal discharge; airway clear.  CARD: tachy, no murmur  RESP: No wheezes, rales or rhonchi.  ABD: Normal bowel sounds; soft; non-distended; non-tender  EXT: Normal ROM.   NEURO: Alert, oriented. Grossly unremarkable. No focal deficits.  PSYCH: Cooperative, appropriate.

## 2023-04-06 NOTE — ED ADULT TRIAGE NOTE - MODE OF ARRIVAL
Render Note In Bullet Format When Appropriate: No Duration Of Freeze Thaw-Cycle (Seconds): 5 Show Aperture Variable?: Yes Post-Care Instructions: I reviewed with the patient in detail post-care instructions. Patient is to wear sunprotection, and avoid picking at any of the treated lesions. Pt may apply Vaseline to crusted or scabbing areas. Consent: The patient's consent was obtained including but not limited to risks of crusting, scabbing, blistering, scarring, darker or lighter pigmentary change, recurrence, incomplete removal and infection. Number Of Freeze-Thaw Cycles: 3 freeze-thaw cycles Detail Level: Detailed [facility] Amb[facility]

## 2023-04-07 ENCOUNTER — APPOINTMENT (OUTPATIENT)
Dept: PSYCHIATRY | Facility: CLINIC | Age: 38
End: 2023-04-07

## 2023-04-07 LAB
A1C WITH ESTIMATED AVERAGE GLUCOSE RESULT: 9.2 % — HIGH (ref 4–5.6)
ESTIMATED AVERAGE GLUCOSE: 217 MG/DL — HIGH (ref 68–114)
GLUCOSE BLDC GLUCOMTR-MCNC: 165 MG/DL — HIGH (ref 70–99)
GLUCOSE BLDC GLUCOMTR-MCNC: 188 MG/DL — HIGH (ref 70–99)
GLUCOSE BLDC GLUCOMTR-MCNC: 259 MG/DL — HIGH (ref 70–99)
GLUCOSE BLDC GLUCOMTR-MCNC: 351 MG/DL — HIGH (ref 70–99)
GLUCOSE BLDC GLUCOMTR-MCNC: 391 MG/DL — HIGH (ref 70–99)
GLUCOSE BLDC GLUCOMTR-MCNC: 404 MG/DL — HIGH (ref 70–99)
GLUCOSE SERPL-MCNC: 299 MG/DL — HIGH (ref 70–99)

## 2023-04-07 PROCEDURE — 99253 IP/OBS CNSLTJ NEW/EST LOW 45: CPT

## 2023-04-07 PROCEDURE — 99231 SBSQ HOSP IP/OBS SF/LOW 25: CPT

## 2023-04-07 PROCEDURE — 99221 1ST HOSP IP/OBS SF/LOW 40: CPT

## 2023-04-07 RX ORDER — IBUPROFEN 200 MG
200 TABLET ORAL EVERY 6 HOURS
Refills: 0 | Status: DISCONTINUED | OUTPATIENT
Start: 2023-04-07 | End: 2023-04-14

## 2023-04-07 RX ORDER — AMITRIPTYLINE HCL 25 MG
75 TABLET ORAL AT BEDTIME
Refills: 0 | Status: DISCONTINUED | OUTPATIENT
Start: 2023-04-07 | End: 2023-04-14

## 2023-04-07 RX ORDER — HYDROXYZINE HCL 10 MG
50 TABLET ORAL EVERY 6 HOURS
Refills: 0 | Status: DISCONTINUED | OUTPATIENT
Start: 2023-04-07 | End: 2023-04-14

## 2023-04-07 RX ORDER — FLUCONAZOLE 150 MG/1
150 TABLET ORAL ONCE
Refills: 0 | Status: COMPLETED | OUTPATIENT
Start: 2023-04-07 | End: 2023-04-07

## 2023-04-07 RX ORDER — LANOLIN ALCOHOL/MO/W.PET/CERES
5 CREAM (GRAM) TOPICAL AT BEDTIME
Refills: 0 | Status: DISCONTINUED | OUTPATIENT
Start: 2023-04-07 | End: 2023-04-14

## 2023-04-07 RX ORDER — INSULIN LISPRO 100/ML
6 VIAL (ML) SUBCUTANEOUS ONCE
Refills: 0 | Status: COMPLETED | OUTPATIENT
Start: 2023-04-07 | End: 2023-04-07

## 2023-04-07 RX ORDER — HALOPERIDOL DECANOATE 100 MG/ML
5 INJECTION INTRAMUSCULAR EVERY 6 HOURS
Refills: 0 | Status: DISCONTINUED | OUTPATIENT
Start: 2023-04-07 | End: 2023-04-14

## 2023-04-07 RX ORDER — INSULIN GLARGINE 100 [IU]/ML
30 INJECTION, SOLUTION SUBCUTANEOUS AT BEDTIME
Refills: 0 | Status: DISCONTINUED | OUTPATIENT
Start: 2023-04-07 | End: 2023-04-08

## 2023-04-07 RX ORDER — INSULIN LISPRO 100/ML
8 VIAL (ML) SUBCUTANEOUS ONCE
Refills: 0 | Status: COMPLETED | OUTPATIENT
Start: 2023-04-07 | End: 2023-04-07

## 2023-04-07 RX ADMIN — ESCITALOPRAM OXALATE 20 MILLIGRAM(S): 10 TABLET, FILM COATED ORAL at 08:09

## 2023-04-07 RX ADMIN — PANTOPRAZOLE SODIUM 40 MILLIGRAM(S): 20 TABLET, DELAYED RELEASE ORAL at 06:30

## 2023-04-07 RX ADMIN — Medication 6: at 21:18

## 2023-04-07 RX ADMIN — Medication 2: at 08:07

## 2023-04-07 RX ADMIN — Medication 8 UNIT(S): at 16:28

## 2023-04-07 RX ADMIN — Medication 25 MILLIGRAM(S): at 08:15

## 2023-04-07 RX ADMIN — Medication 8 UNIT(S): at 08:06

## 2023-04-07 RX ADMIN — FLUCONAZOLE 150 MILLIGRAM(S): 150 TABLET ORAL at 13:10

## 2023-04-07 RX ADMIN — Medication 10: at 11:42

## 2023-04-07 RX ADMIN — Medication 8 UNIT(S): at 11:42

## 2023-04-07 RX ADMIN — INSULIN GLARGINE 30 UNIT(S): 100 INJECTION, SOLUTION SUBCUTANEOUS at 21:20

## 2023-04-07 RX ADMIN — Medication 2: at 16:29

## 2023-04-07 RX ADMIN — Medication 100 MILLIGRAM(S): at 21:12

## 2023-04-07 RX ADMIN — Medication 8 UNIT(S): at 01:56

## 2023-04-07 RX ADMIN — Medication 25 MILLIGRAM(S): at 21:12

## 2023-04-07 RX ADMIN — Medication 1 DROP(S): at 21:12

## 2023-04-07 RX ADMIN — Medication 75 MILLIGRAM(S): at 21:12

## 2023-04-07 RX ADMIN — Medication 6 UNIT(S): at 21:33

## 2023-04-07 RX ADMIN — Medication 325 MILLIGRAM(S): at 08:08

## 2023-04-07 NOTE — CONSULT NOTE ADULT - TIME BILLING
reviewing previous notes,  labs, time spent at bedside interviewing/examining the patient, discussion of plan of care, prognosis and treatment.

## 2023-04-07 NOTE — PHYSICAL THERAPY INITIAL EVALUATION ADULT - GENERAL OBSERVATIONS, REHAB EVAL
13:00 -13:30 Chart reviewed. Order received.  Patient is ok to be  seen for PT,confirmed with RN. pt encountered  Supine in the bed  denies pain, and agrees to participate in session, 1:1 PCA at bedside  ,NAD.

## 2023-04-07 NOTE — DISCUSSION/SUMMARY
[FreeTextEntry1] : Appointment was cancelled as patient was hospitalized yesterday on the inpatient unit. Will reschedule once patient is discharged.

## 2023-04-07 NOTE — BH INPATIENT PSYCHIATRY PROGRESS NOTE - NSBHCHARTREVIEWVS_PSY_A_CORE FT
Vital Signs Last 24 Hrs  T(C): 36.9 (04-06-23 @ 15:55), Max: 36.9 (04-06-23 @ 15:55)  T(F): 98.5 (04-06-23 @ 15:55), Max: 98.5 (04-06-23 @ 15:55)  HR: 131 (04-06-23 @ 18:47) (111 - 131)  BP: 143/72 (04-06-23 @ 18:47) (142/87 - 154/95)  BP(mean): --  RR: 16 (04-06-23 @ 15:55) (16 - 18)  SpO2: --     Vital Signs Last 24 Hrs  T(C): 36.7 (04-07-23 @ 09:07), Max: 36.9 (04-06-23 @ 15:55)  T(F): 98.1 (04-07-23 @ 09:07), Max: 98.5 (04-06-23 @ 15:55)  HR: 114 (04-07-23 @ 09:07) (111 - 131)  BP: 137/93 (04-07-23 @ 09:07) (137/93 - 154/95)  BP(mean): --  RR: 13 (04-07-23 @ 09:07) (13 - 18)  SpO2: --     Vital Signs Last 24 Hrs  T(C): 36.7 (04-07-23 @ 09:07), Max: 36.9 (04-06-23 @ 15:55)  T(F): 98.1 (04-07-23 @ 09:07), Max: 98.5 (04-06-23 @ 15:55)  HR: 114 (04-07-23 @ 09:07) (114 - 131)  BP: 137/93 (04-07-23 @ 09:07) (137/93 - 154/95)  BP(mean): --  RR: 13 (04-07-23 @ 09:07) (13 - 16)  SpO2: --     Vital Signs Last 24 Hrs  T(C): 36.7 (04-07-23 @ 16:00), Max: 36.7 (04-07-23 @ 09:07)  T(F): 98.1 (04-07-23 @ 16:00), Max: 98.1 (04-07-23 @ 09:07)  HR: 107 (04-07-23 @ 16:00) (107 - 131)  BP: 148/83 (04-07-23 @ 16:00) (137/93 - 148/83)  BP(mean): --  RR: 16 (04-07-23 @ 16:00) (13 - 16)  SpO2: --

## 2023-04-07 NOTE — BH INPATIENT PSYCHIATRY PROGRESS NOTE - PRN MEDS
MEDICATIONS  (PRN):  albuterol    90 MICROgram(s) HFA Inhaler 1 Puff(s) Inhalation four times a day PRN Shortness of Breath  dextrose Oral Gel 15 Gram(s) Oral once PRN Blood Glucose LESS THAN 70 milliGRAM(s)/deciliter  dextrose Oral Gel 15 Gram(s) Oral once PRN Blood Glucose LESS THAN 70 milliGRAM(s)/deciliter  ondansetron    Tablet 4 milliGRAM(s) Oral every 8 hours PRN nausea   MEDICATIONS  (PRN):  albuterol    90 MICROgram(s) HFA Inhaler 1 Puff(s) Inhalation four times a day PRN Shortness of Breath  dextrose Oral Gel 15 Gram(s) Oral once PRN Blood Glucose LESS THAN 70 milliGRAM(s)/deciliter  dextrose Oral Gel 15 Gram(s) Oral once PRN Blood Glucose LESS THAN 70 milliGRAM(s)/deciliter  haloperidol     Tablet 5 milliGRAM(s) Oral every 6 hours PRN agitation  ibuprofen  Tablet. 200 milliGRAM(s) Oral every 6 hours PRN Mild Pain (1 - 3)  LORazepam     Tablet 2 milliGRAM(s) Oral every 6 hours PRN agitation  melatonin. 5 milliGRAM(s) Oral at bedtime PRN Insomnia  ondansetron    Tablet 4 milliGRAM(s) Oral every 8 hours PRN nausea   MEDICATIONS  (PRN):  albuterol    90 MICROgram(s) HFA Inhaler 1 Puff(s) Inhalation four times a day PRN Shortness of Breath  dextrose Oral Gel 15 Gram(s) Oral once PRN Blood Glucose LESS THAN 70 milliGRAM(s)/deciliter  dextrose Oral Gel 15 Gram(s) Oral once PRN Blood Glucose LESS THAN 70 milliGRAM(s)/deciliter  haloperidol     Tablet 5 milliGRAM(s) Oral every 6 hours PRN agitation  hydrOXYzine hydrochloride 50 milliGRAM(s) Oral every 6 hours PRN anxiety/psychogenic seizures  ibuprofen  Tablet. 200 milliGRAM(s) Oral every 6 hours PRN Mild Pain (1 - 3)  LORazepam     Tablet 2 milliGRAM(s) Oral every 6 hours PRN agitation  melatonin. 5 milliGRAM(s) Oral at bedtime PRN Insomnia  ondansetron    Tablet 4 milliGRAM(s) Oral every 8 hours PRN nausea

## 2023-04-07 NOTE — PHYSICAL THERAPY INITIAL EVALUATION ADULT - NSACTIVITYREC_GEN_A_PT
Patient is independent with Bed mobility , supervision W/ transfer , ambulation w/ good static / dynamic balance, Skill PT not recommended at this time ,Reconsult in future as appropriate. . Am Pac Mobility Score : 22
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
## 2023-04-07 NOTE — BH INPATIENT PSYCHIATRY PROGRESS NOTE - NSBHMETABOLIC_PSY_ALL_CORE_FT
BMI: BMI (kg/m2): 32.1 (04-06-23 @ 09:18)  HbA1c:   Glucose: POCT Blood Glucose.: 188 mg/dL (04-07-23 @ 06:34)    BP: 143/72 (04-06-23 @ 18:47) (141/75 - 154/95)  Lipid Panel:  BMI: BMI (kg/m2): 32.1 (04-06-23 @ 09:18)  HbA1c:   Glucose: POCT Blood Glucose.: 188 mg/dL (04-07-23 @ 06:34)    BP: 137/93 (04-07-23 @ 09:07) (137/93 - 154/95)  Lipid Panel:  BMI: BMI (kg/m2): 32.1 (04-06-23 @ 09:18)  HbA1c:   Glucose: POCT Blood Glucose.: 391 mg/dL (04-07-23 @ 11:26)    BP: 137/93 (04-07-23 @ 09:07) (137/93 - 154/95)  Lipid Panel:  BMI: BMI (kg/m2): 32.1 (04-06-23 @ 09:18)  HbA1c: A1C with Estimated Average Glucose Result: 9.2 % (04-07-23 @ 08:11)    Glucose: POCT Blood Glucose.: 165 mg/dL (04-07-23 @ 16:06)    BP: 148/83 (04-07-23 @ 16:00) (137/93 - 154/95)  Lipid Panel:

## 2023-04-07 NOTE — BH TREATMENT PLAN - NSTXDEPRESINTERRN_PSY_ALL_CORE
RN to encourage verbalization of feelings.  RN to encourage medication compliance and provide support and education as needed on Dx, coping skills, medication, and safety planning.  RN to encourage daily ADL's  RN to encourage group attendance  RN to assess and intervene for any depressive behaviors

## 2023-04-07 NOTE — CHART NOTE - NSCHARTNOTEFT_GEN_A_CORE
RN called and patient was given 8 units of Lispro 2 hours ago and her fingerstick is now 404.    Patient is sleeping but is easily arousable, alert and oriented and asymptomatic.     Will give Lispro 8 mg SQ now and recheck FS in 2 hours.

## 2023-04-07 NOTE — BH TREATMENT PLAN - NSPTSTATEDGOAL_PSY_ALL_CORE
"I want to develop better coping skills for anxiety and learn how to create distance from my boyfriend"

## 2023-04-07 NOTE — UM REPORT PROGRESS NOTE - NSUMRMPROVIDER_GEN_A_CORE FT
Patient: Sigifredo Jorge  : 1985  INSURANCE: [employer]  ID# [id number]  [phone]        Spoke to Ana, Auth # [id number]. Clinicals have been faxed to [phone]. Awaiting call back from .   Patient: Sigifredo Jorge  : 1985  INSURANCE: [employer]  ID# [id number]  [phone]          30 days authorized from -2023 review on  w Leeanne  Patient: Sigifredo Jorge  : 1985  INSURANCE: [employer]  ID# [id number]  [phone]        Spoke to Ana, Royal # [id number]. Clinicals have been faxed to [phone]. Awaiting call back from .   Patient: Sigifredo Jorge  : 1985  INSURANCE: [employer]  ID# [id number]  [phone]    30 days authorized from -2023 review on 5-5 w Leeanne     - Provided weekly update to Leeanne on her vm. [phone]. Anticipated dc .

## 2023-04-07 NOTE — PHYSICAL THERAPY INITIAL EVALUATION ADULT - PERTINENT HX OF CURRENT PROBLEM, REHAB EVAL
37F who is domiciled at [facility] with mother, unemployed, single, with PMHx of T1DM, Diabetic neuropathy, HTN, chronic back pain, migraine headaches, PPHx of anxiety/depression, 3 prior psychiatric admissions for suicide attempts as a teenager (once by taking a capful of acetone, another by drinking hydrogen peroxide, another attempt by taking half a bottle of ibuprofen), initially presented for evaluation after ongoing pseudoseizures throughout the day today (upwards of 12 per her report), and as she was being discharged, made a suicidal statement, prompting this consultation.

## 2023-04-07 NOTE — BH INPATIENT PSYCHIATRY PROGRESS NOTE - CURRENT MEDICATION
MEDICATIONS  (STANDING):  dextrose 5%. 1000 milliLiter(s) (50 mL/Hr) IV Continuous <Continuous>  dextrose 5%. 1000 milliLiter(s) (100 mL/Hr) IV Continuous <Continuous>  dextrose 5%. 1000 milliLiter(s) (50 mL/Hr) IV Continuous <Continuous>  dextrose 5%. 1000 milliLiter(s) (100 mL/Hr) IV Continuous <Continuous>  dextrose 50% Injectable 25 Gram(s) IV Push once  dextrose 50% Injectable 12.5 Gram(s) IV Push once  dextrose 50% Injectable 25 Gram(s) IV Push once  dextrose 50% Injectable 25 Gram(s) IV Push once  dextrose 50% Injectable 12.5 Gram(s) IV Push once  dextrose 50% Injectable 25 Gram(s) IV Push once  escitalopram 20 milliGRAM(s) Oral daily  ferrous    sulfate 325 milliGRAM(s) Oral daily  glucagon  Injectable 1 milliGRAM(s) IntraMuscular once  glucagon  Injectable 1 milliGRAM(s) IntraMuscular once  insulin glargine Injectable (LANTUS) 25 Unit(s) SubCutaneous at bedtime  insulin lispro (ADMELOG) corrective regimen sliding scale   SubCutaneous three times a day before meals  insulin lispro (ADMELOG) corrective regimen sliding scale   SubCutaneous at bedtime  insulin lispro Injectable (ADMELOG) 8 Unit(s) SubCutaneous three times a day before meals  metoprolol tartrate 25 milliGRAM(s) Oral two times a day  pantoprazole    Tablet 40 milliGRAM(s) Oral before breakfast    MEDICATIONS  (PRN):  albuterol    90 MICROgram(s) HFA Inhaler 1 Puff(s) Inhalation four times a day PRN Shortness of Breath  dextrose Oral Gel 15 Gram(s) Oral once PRN Blood Glucose LESS THAN 70 milliGRAM(s)/deciliter  dextrose Oral Gel 15 Gram(s) Oral once PRN Blood Glucose LESS THAN 70 milliGRAM(s)/deciliter  ondansetron    Tablet 4 milliGRAM(s) Oral every 8 hours PRN nausea   MEDICATIONS  (STANDING):  dextrose 5%. 1000 milliLiter(s) (50 mL/Hr) IV Continuous <Continuous>  dextrose 5%. 1000 milliLiter(s) (100 mL/Hr) IV Continuous <Continuous>  dextrose 5%. 1000 milliLiter(s) (50 mL/Hr) IV Continuous <Continuous>  dextrose 5%. 1000 milliLiter(s) (100 mL/Hr) IV Continuous <Continuous>  dextrose 50% Injectable 25 Gram(s) IV Push once  dextrose 50% Injectable 12.5 Gram(s) IV Push once  dextrose 50% Injectable 25 Gram(s) IV Push once  dextrose 50% Injectable 25 Gram(s) IV Push once  dextrose 50% Injectable 12.5 Gram(s) IV Push once  dextrose 50% Injectable 25 Gram(s) IV Push once  escitalopram 20 milliGRAM(s) Oral daily  ferrous    sulfate 325 milliGRAM(s) Oral daily  glucagon  Injectable 1 milliGRAM(s) IntraMuscular once  glucagon  Injectable 1 milliGRAM(s) IntraMuscular once  insulin glargine Injectable (LANTUS) 30 Unit(s) SubCutaneous at bedtime  insulin lispro (ADMELOG) corrective regimen sliding scale   SubCutaneous three times a day before meals  insulin lispro (ADMELOG) corrective regimen sliding scale   SubCutaneous at bedtime  insulin lispro Injectable (ADMELOG) 8 Unit(s) SubCutaneous three times a day before meals  metoprolol tartrate 25 milliGRAM(s) Oral two times a day  pantoprazole    Tablet 40 milliGRAM(s) Oral before breakfast    MEDICATIONS  (PRN):  albuterol    90 MICROgram(s) HFA Inhaler 1 Puff(s) Inhalation four times a day PRN Shortness of Breath  dextrose Oral Gel 15 Gram(s) Oral once PRN Blood Glucose LESS THAN 70 milliGRAM(s)/deciliter  dextrose Oral Gel 15 Gram(s) Oral once PRN Blood Glucose LESS THAN 70 milliGRAM(s)/deciliter  haloperidol     Tablet 5 milliGRAM(s) Oral every 6 hours PRN agitation  ibuprofen  Tablet. 200 milliGRAM(s) Oral every 6 hours PRN Mild Pain (1 - 3)  LORazepam     Tablet 2 milliGRAM(s) Oral every 6 hours PRN agitation  melatonin. 5 milliGRAM(s) Oral at bedtime PRN Insomnia  ondansetron    Tablet 4 milliGRAM(s) Oral every 8 hours PRN nausea   MEDICATIONS  (STANDING):  dextrose 5%. 1000 milliLiter(s) (50 mL/Hr) IV Continuous <Continuous>  dextrose 5%. 1000 milliLiter(s) (100 mL/Hr) IV Continuous <Continuous>  dextrose 5%. 1000 milliLiter(s) (50 mL/Hr) IV Continuous <Continuous>  dextrose 5%. 1000 milliLiter(s) (100 mL/Hr) IV Continuous <Continuous>  dextrose 50% Injectable 25 Gram(s) IV Push once  dextrose 50% Injectable 12.5 Gram(s) IV Push once  dextrose 50% Injectable 25 Gram(s) IV Push once  dextrose 50% Injectable 25 Gram(s) IV Push once  dextrose 50% Injectable 12.5 Gram(s) IV Push once  dextrose 50% Injectable 25 Gram(s) IV Push once  escitalopram 20 milliGRAM(s) Oral daily  ferrous    sulfate 325 milliGRAM(s) Oral daily  fluconAZOLE   Tablet 150 milliGRAM(s) Oral once  glucagon  Injectable 1 milliGRAM(s) IntraMuscular once  glucagon  Injectable 1 milliGRAM(s) IntraMuscular once  insulin glargine Injectable (LANTUS) 30 Unit(s) SubCutaneous at bedtime  insulin lispro (ADMELOG) corrective regimen sliding scale   SubCutaneous three times a day before meals  insulin lispro (ADMELOG) corrective regimen sliding scale   SubCutaneous at bedtime  insulin lispro Injectable (ADMELOG) 8 Unit(s) SubCutaneous three times a day before meals  metoprolol tartrate 25 milliGRAM(s) Oral two times a day  pantoprazole    Tablet 40 milliGRAM(s) Oral before breakfast    MEDICATIONS  (PRN):  albuterol    90 MICROgram(s) HFA Inhaler 1 Puff(s) Inhalation four times a day PRN Shortness of Breath  dextrose Oral Gel 15 Gram(s) Oral once PRN Blood Glucose LESS THAN 70 milliGRAM(s)/deciliter  dextrose Oral Gel 15 Gram(s) Oral once PRN Blood Glucose LESS THAN 70 milliGRAM(s)/deciliter  haloperidol     Tablet 5 milliGRAM(s) Oral every 6 hours PRN agitation  ibuprofen  Tablet. 200 milliGRAM(s) Oral every 6 hours PRN Mild Pain (1 - 3)  LORazepam     Tablet 2 milliGRAM(s) Oral every 6 hours PRN agitation  melatonin. 5 milliGRAM(s) Oral at bedtime PRN Insomnia  ondansetron    Tablet 4 milliGRAM(s) Oral every 8 hours PRN nausea   MEDICATIONS  (STANDING):  amitriptyline 75 milliGRAM(s) Oral at bedtime  artificial tears (preservative free) Ophthalmic Solution 1 Drop(s) Both EYES daily  dextrose 5%. 1000 milliLiter(s) (50 mL/Hr) IV Continuous <Continuous>  dextrose 5%. 1000 milliLiter(s) (100 mL/Hr) IV Continuous <Continuous>  dextrose 5%. 1000 milliLiter(s) (50 mL/Hr) IV Continuous <Continuous>  dextrose 5%. 1000 milliLiter(s) (100 mL/Hr) IV Continuous <Continuous>  dextrose 50% Injectable 25 Gram(s) IV Push once  dextrose 50% Injectable 12.5 Gram(s) IV Push once  dextrose 50% Injectable 25 Gram(s) IV Push once  dextrose 50% Injectable 25 Gram(s) IV Push once  dextrose 50% Injectable 12.5 Gram(s) IV Push once  dextrose 50% Injectable 25 Gram(s) IV Push once  doxycycline monohydrate Capsule 100 milliGRAM(s) Oral every 12 hours  escitalopram 20 milliGRAM(s) Oral daily  ferrous    sulfate 325 milliGRAM(s) Oral daily  glucagon  Injectable 1 milliGRAM(s) IntraMuscular once  glucagon  Injectable 1 milliGRAM(s) IntraMuscular once  insulin glargine Injectable (LANTUS) 30 Unit(s) SubCutaneous at bedtime  insulin lispro (ADMELOG) corrective regimen sliding scale   SubCutaneous three times a day before meals  insulin lispro (ADMELOG) corrective regimen sliding scale   SubCutaneous at bedtime  insulin lispro Injectable (ADMELOG) 8 Unit(s) SubCutaneous three times a day before meals  metoprolol tartrate 25 milliGRAM(s) Oral two times a day  pantoprazole    Tablet 40 milliGRAM(s) Oral before breakfast    MEDICATIONS  (PRN):  albuterol    90 MICROgram(s) HFA Inhaler 1 Puff(s) Inhalation four times a day PRN Shortness of Breath  dextrose Oral Gel 15 Gram(s) Oral once PRN Blood Glucose LESS THAN 70 milliGRAM(s)/deciliter  dextrose Oral Gel 15 Gram(s) Oral once PRN Blood Glucose LESS THAN 70 milliGRAM(s)/deciliter  haloperidol     Tablet 5 milliGRAM(s) Oral every 6 hours PRN agitation  hydrOXYzine hydrochloride 50 milliGRAM(s) Oral every 6 hours PRN anxiety/psychogenic seizures  ibuprofen  Tablet. 200 milliGRAM(s) Oral every 6 hours PRN Mild Pain (1 - 3)  LORazepam     Tablet 2 milliGRAM(s) Oral every 6 hours PRN agitation  melatonin. 5 milliGRAM(s) Oral at bedtime PRN Insomnia  ondansetron    Tablet 4 milliGRAM(s) Oral every 8 hours PRN nausea
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
## 2023-04-07 NOTE — BH INPATIENT PSYCHIATRY PROGRESS NOTE - NSBHASSESSSUMMFT_PSY_ALL_CORE
37F who is domiciled at [facility] with mother, unemployed, single, with PMHx of T1DM, Diabetic neuropathy, HTN, chronic back pain, migraine headaches, PPHx of anxiety/depression, 3 prior psychiatric admissions for suicide attempts as a teenager (once by taking a capful of acetone, another by drinking hydrogen peroxide, another attempt by taking half a bottle of ibuprofen), initially presented for evaluation after ongoing pseudoseizures throughout the day today (upwards of 12 per her report), and as she was being discharged, made a suicidal statement, prompting this consultation. Pt presents w/ anxiety as well as SI w/plan to overdose on klonopin (though this plan is not feasible for patient as she is not prescribed klonopin), in the context of recent SA about two months ago which she did not disclose to anybody at the time. Patient currently admitted voluntarily.    Impression: ARMAAN vs somatic symptoms disorder vs Cluster B traits    #ARMAAN   - Continue Lexapro 20 mg  - Consider changing to another antidepressant (previously trailed on Cymbalta and Celexa)  - Further discuss benzodiazepines with outpatient provider and patient  - Consider starting Prazosin for nightmares    #Agitation  -For episodes of acute agitation can offer PO Haldol 5 mg/Ativan 2 mg/Benadryl 50 mg Q6H PRN. If refusing PO and is in acute risk of harm to self/other, can offer IM Haldol 5 mg/Ativan 2 mg/Benadryl 50 mg Q6H PRN. If given, please repeat EKG and hold antipsychotics if QTC>500     #Diabetes   - DC insulin pump while on inpatient unit  - Lantus 25 at bedtime  - Lispro 8 TID  - Sliding scale insulin    #HTN  - Metoprolol 25 mg BID    #Anemia  - Ferrous sulfate    #Nausea  - PRN Odansetron   37F who is domiciled at [facility] with mother, unemployed, single, with PMHx of T1DM, Diabetic neuropathy, HTN, chronic back pain, migraine headaches, PPHx of anxiety/depression, 3 prior psychiatric admissions for suicide attempts as a teenager (once by taking a capful of acetone, another by drinking hydrogen peroxide, another attempt by taking half a bottle of ibuprofen), initially presented for evaluation after ongoing pseudoseizures throughout the day today (upwards of 12 per her report), and as she was being discharged, made a suicidal statement, prompting this consultation. Pt presents w/ anxiety as well as SI w/plan to overdose on klonopin (though this plan is not feasible for patient as she is not prescribed klonopin), in the context of recent SA about two months ago which she did not disclose to anybody at the time. While on the unit the patient has generally appeared euthymic, is sociable, and keenly interested in discussing the interpersonal difficulties she is currently experiencing at home. 04/07 The patient  had a pseudoseizure yesterday after discussing her boyfriend her mother. When interviewed however, the patient appeared euthymic, future oriented, and eager to develop more skills to help with her anxiety and interpersonal struggles (including creating distance between herself and her boyfriend); she denies any suicidal ideation today    Impression: ARMAAN vs somatic symptoms disorder vs Cluster B traits    #ARMAAN   - Continue Lexapro 20 mg  - Consider changing to another antidepressant (previously trailed on Cymbalta and Celexa), no change for now  - Further discuss benzodiazepines with outpatient provider and patient, no change for now  - Consider starting Prazosin for nightmares  - Treatment mainly centered on creating a validating environment for patient, and increasing insight into the nature of her anxieties and interpersonal difficulties    #Agitation  -For episodes of acute agitation can offer PO Haldol 5 mg/Ativan 2 mg/Benadryl 50 mg Q6H PRN. If refusing PO and is in acute risk of harm to self/other, can offer IM Haldol 5 mg/Ativan 2 mg/Benadryl 50 mg Q6H PRN. If given, please repeat EKG and hold antipsychotics if QTC>500     #Diabetes   - DC insulin pump while on inpatient unit  - Lantus 30 at bedtime  - Lispro 8 TID  - Sliding scale insulin    #HTN  - Metoprolol 25 mg BID    #Anemia  - Ferrous sulfate    #Nausea  - PRN Odansetron

## 2023-04-07 NOTE — PHYSICAL THERAPY INITIAL EVALUATION ADULT - ADDITIONAL COMMENTS
pt is 36 y/o F , lives [facility]  , information obtain from the pt herself ,  pt was independent using  Rollator with bed mobility , transfer , ambulation ,and basic ADLS.
No lymphadedenopathy

## 2023-04-08 LAB
GLUCOSE BLDC GLUCOMTR-MCNC: 239 MG/DL — HIGH (ref 70–99)
GLUCOSE BLDC GLUCOMTR-MCNC: 265 MG/DL — HIGH (ref 70–99)
GLUCOSE BLDC GLUCOMTR-MCNC: 286 MG/DL — HIGH (ref 70–99)
GLUCOSE BLDC GLUCOMTR-MCNC: 296 MG/DL — HIGH (ref 70–99)
GLUCOSE BLDC GLUCOMTR-MCNC: 323 MG/DL — HIGH (ref 70–99)

## 2023-04-08 PROCEDURE — 99232 SBSQ HOSP IP/OBS MODERATE 35: CPT

## 2023-04-08 RX ORDER — FLUCONAZOLE 150 MG/1
150 TABLET ORAL ONCE
Refills: 0 | Status: COMPLETED | OUTPATIENT
Start: 2023-04-10 | End: 2023-04-10

## 2023-04-08 RX ORDER — INSULIN GLARGINE 100 [IU]/ML
35 INJECTION, SOLUTION SUBCUTANEOUS AT BEDTIME
Refills: 0 | Status: DISCONTINUED | OUTPATIENT
Start: 2023-04-08 | End: 2023-04-14

## 2023-04-08 RX ADMIN — Medication 8 UNIT(S): at 16:26

## 2023-04-08 RX ADMIN — Medication 6: at 11:27

## 2023-04-08 RX ADMIN — Medication 25 MILLIGRAM(S): at 20:19

## 2023-04-08 RX ADMIN — ESCITALOPRAM OXALATE 20 MILLIGRAM(S): 10 TABLET, FILM COATED ORAL at 08:11

## 2023-04-08 RX ADMIN — INSULIN GLARGINE 35 UNIT(S): 100 INJECTION, SOLUTION SUBCUTANEOUS at 20:20

## 2023-04-08 RX ADMIN — Medication 75 MILLIGRAM(S): at 20:19

## 2023-04-08 RX ADMIN — Medication 6: at 16:26

## 2023-04-08 RX ADMIN — Medication 8: at 07:24

## 2023-04-08 RX ADMIN — PANTOPRAZOLE SODIUM 40 MILLIGRAM(S): 20 TABLET, DELAYED RELEASE ORAL at 06:41

## 2023-04-08 RX ADMIN — Medication 8 UNIT(S): at 11:27

## 2023-04-08 RX ADMIN — Medication 25 MILLIGRAM(S): at 08:11

## 2023-04-08 RX ADMIN — Medication 1 DROP(S): at 08:11

## 2023-04-08 RX ADMIN — Medication 100 MILLIGRAM(S): at 20:19

## 2023-04-08 RX ADMIN — Medication 100 MILLIGRAM(S): at 08:11

## 2023-04-08 RX ADMIN — Medication 200 MILLIGRAM(S): at 11:31

## 2023-04-08 RX ADMIN — Medication 8 UNIT(S): at 07:24

## 2023-04-08 RX ADMIN — Medication 325 MILLIGRAM(S): at 08:11

## 2023-04-08 NOTE — BH INPATIENT PSYCHIATRY PROGRESS NOTE - PRN MEDS
MEDICATIONS  (PRN):  albuterol    90 MICROgram(s) HFA Inhaler 1 Puff(s) Inhalation four times a day PRN Shortness of Breath  dextrose Oral Gel 15 Gram(s) Oral once PRN Blood Glucose LESS THAN 70 milliGRAM(s)/deciliter  dextrose Oral Gel 15 Gram(s) Oral once PRN Blood Glucose LESS THAN 70 milliGRAM(s)/deciliter  haloperidol     Tablet 5 milliGRAM(s) Oral every 6 hours PRN agitation  hydrOXYzine hydrochloride 50 milliGRAM(s) Oral every 6 hours PRN anxiety/psychogenic seizures  ibuprofen  Tablet. 200 milliGRAM(s) Oral every 6 hours PRN Mild Pain (1 - 3)  LORazepam     Tablet 2 milliGRAM(s) Oral every 6 hours PRN agitation  melatonin. 5 milliGRAM(s) Oral at bedtime PRN Insomnia  ondansetron    Tablet 4 milliGRAM(s) Oral every 8 hours PRN nausea

## 2023-04-08 NOTE — BH INPATIENT PSYCHIATRY PROGRESS NOTE - NSBHFUPINTERVALHXFT_PSY_A_CORE
Chart reviewed. Patient seen and evaluated at bedside. No acute overnight events reported.     Patient seen at bedside, noted to be sleeping in bed but wakes up for interviewers. Patient reports that she is okay, reporting that she continues to be with some anxiety, reporting that it is "about the same" since admission. No prns administered overnight. No acute complaints per patient.

## 2023-04-08 NOTE — PROGRESS NOTE ADULT - ASSESSMENT
PRINCIPAL PROBLEM  Diabetes mellitus type 1 with hyperglycemia with neuropathy   - increase lantus to 35 units HS, cont lispro 8 units TID ac, on SSI   - c/b yeast infection .   -  a1c 9.2 %     ACTIVE PROBLEMS  Yeast Infection, vaginal candidiasis  - diflucan 150 mg repeat 2nd dose on      GERD  - cont protonix 40 mg po daily   - add maalox prn     Pseudoseizure r/w worsening  bipolar depression   - mgmt per psychiatry   - cont lexapro 20 mg daily, prn haldol and ativan, CBT per psychiatry     Bipolar depression/ARMAAN with worsening mood, relapsing.   - hx/o SA 2 months ago   - trigger: father  from liver cancer     Obesity BMI 32   - wt loss counseling per PCP     CHRONIC PROBLEMS  DMT2 Neuropathy right lower extremity, vaginal  - improved diabetic control   - pt allergic to gabapentin     Degenerative disc disease, thoracic, Spinal stenosis  - stable     Hx/o headache/migraine  - currently stable     Tachycardia  - cont metoprolol BID     DVT/GI px  - ambulatory /ppi po     FULL CODE     Will sign off, please call prn, thank you for allowing us to participate in the care of this patient.

## 2023-04-08 NOTE — PROGRESS NOTE ADULT - SUBJECTIVE AND OBJECTIVE BOX
I was called by medical PA and RN. Pt had a severe episode of pseudoseizures (generalized shakes and tremors). Pt needs to be on 1:1 for safety.  No other recommendations at this time (she has medical orders). As per PA, yesterday, she had 12 episodes of pseudoseizures in her residence.  I also spoke to the head nurse, Chhaya regarding pt's treatment plan
CRISTI Mccoy    Subjective/Interval History   -vaginal burning improving  - indigestion.   - mood improved.     ROS  - no chest pain or SOB.     PHYSICAL EXAM  Vital Signs Last 24 Hrs  T(C): 36.7 (07 Apr 2023 16:00), Max: 36.7 (07 Apr 2023 16:00)  T(F): 98.1 (07 Apr 2023 16:00), Max: 98.1 (07 Apr 2023 16:00)  HR: 110 (08 Apr 2023 08:21) (107 - 110)  BP: 148/83 (07 Apr 2023 16:00) (148/83 - 148/83)  BP(mean): --  RR: 16 (08 Apr 2023 08:21) (16 - 16)  SpO2: --      GA : AAOX3, NAD   HEENT: PERRLA, EOMI  NECK: no JVD, no thyromegaly   CVS: S1 S2 no murmur no rubs no gallop  RESP: CTAB no wheeze, no rhonchi no rales  ABD: Soft, NT, ND, tympanic, no rebound or guarding   : No Freitas, No CVA tenderness   EXT; no pedal edema, no cyanosis  MSK: No ML spinal tenderness, normal ROM   NEURO: AAOX3, no new focal deficits     LABS/ IMAGING        04-07    x   |  x   |  x   ----------------------------<  299<H>  x    |  x   |  x         CAPILLARY BLOOD GLUCOSE      POCT Blood Glucose.: 296 mg/dL (08 Apr 2023 11:04)  POCT Blood Glucose.: 323 mg/dL (08 Apr 2023 06:32)  POCT Blood Glucose.: 351 mg/dL (07 Apr 2023 21:16)  POCT Blood Glucose.: 259 mg/dL (07 Apr 2023 19:56)  POCT Blood Glucose.: 165 mg/dL (07 Apr 2023 16:06)

## 2023-04-08 NOTE — BH INPATIENT PSYCHIATRY PROGRESS NOTE - NSBHMETABOLIC_PSY_ALL_CORE_FT
BMI: BMI (kg/m2): 32.1 (04-06-23 @ 09:18)  HbA1c: A1C with Estimated Average Glucose Result: 9.2 % (04-07-23 @ 08:11)    Glucose: POCT Blood Glucose.: 296 mg/dL (04-08-23 @ 11:04)    BP: 148/83 (04-07-23 @ 16:00) (137/93 - 154/95)  Lipid Panel:

## 2023-04-08 NOTE — BH INPATIENT PSYCHIATRY PROGRESS NOTE - NSBHCHARTREVIEWVS_PSY_A_CORE FT
Vital Signs Last 24 Hrs  T(C): 36.7 (04-07-23 @ 16:00), Max: 36.7 (04-07-23 @ 16:00)  T(F): 98.1 (04-07-23 @ 16:00), Max: 98.1 (04-07-23 @ 16:00)  HR: 110 (04-08-23 @ 08:21) (107 - 110)  BP: 148/83 (04-07-23 @ 16:00) (148/83 - 148/83)  BP(mean): --  RR: 16 (04-08-23 @ 08:21) (16 - 16)  SpO2: --    Orthostatic VS  04-08-23 @ 08:21  Lying BP: 129/70 HR: --  Sitting BP: --/-- HR: --  Standing BP: --/-- HR: --  Site: --  Mode: --

## 2023-04-08 NOTE — BH INPATIENT PSYCHIATRY PROGRESS NOTE - CURRENT MEDICATION
MEDICATIONS  (STANDING):  amitriptyline 75 milliGRAM(s) Oral at bedtime  artificial tears (preservative free) Ophthalmic Solution 1 Drop(s) Both EYES daily  dextrose 5%. 1000 milliLiter(s) (50 mL/Hr) IV Continuous <Continuous>  dextrose 5%. 1000 milliLiter(s) (100 mL/Hr) IV Continuous <Continuous>  dextrose 5%. 1000 milliLiter(s) (50 mL/Hr) IV Continuous <Continuous>  dextrose 5%. 1000 milliLiter(s) (100 mL/Hr) IV Continuous <Continuous>  dextrose 50% Injectable 25 Gram(s) IV Push once  dextrose 50% Injectable 12.5 Gram(s) IV Push once  dextrose 50% Injectable 25 Gram(s) IV Push once  dextrose 50% Injectable 25 Gram(s) IV Push once  dextrose 50% Injectable 12.5 Gram(s) IV Push once  dextrose 50% Injectable 25 Gram(s) IV Push once  doxycycline monohydrate Capsule 100 milliGRAM(s) Oral every 12 hours  escitalopram 20 milliGRAM(s) Oral daily  ferrous    sulfate 325 milliGRAM(s) Oral daily  glucagon  Injectable 1 milliGRAM(s) IntraMuscular once  glucagon  Injectable 1 milliGRAM(s) IntraMuscular once  insulin glargine Injectable (LANTUS) 35 Unit(s) SubCutaneous at bedtime  insulin lispro (ADMELOG) corrective regimen sliding scale   SubCutaneous three times a day before meals  insulin lispro (ADMELOG) corrective regimen sliding scale   SubCutaneous at bedtime  insulin lispro Injectable (ADMELOG) 8 Unit(s) SubCutaneous three times a day before meals  metoprolol tartrate 25 milliGRAM(s) Oral two times a day  pantoprazole    Tablet 40 milliGRAM(s) Oral before breakfast    MEDICATIONS  (PRN):  albuterol    90 MICROgram(s) HFA Inhaler 1 Puff(s) Inhalation four times a day PRN Shortness of Breath  dextrose Oral Gel 15 Gram(s) Oral once PRN Blood Glucose LESS THAN 70 milliGRAM(s)/deciliter  dextrose Oral Gel 15 Gram(s) Oral once PRN Blood Glucose LESS THAN 70 milliGRAM(s)/deciliter  haloperidol     Tablet 5 milliGRAM(s) Oral every 6 hours PRN agitation  hydrOXYzine hydrochloride 50 milliGRAM(s) Oral every 6 hours PRN anxiety/psychogenic seizures  ibuprofen  Tablet. 200 milliGRAM(s) Oral every 6 hours PRN Mild Pain (1 - 3)  LORazepam     Tablet 2 milliGRAM(s) Oral every 6 hours PRN agitation  melatonin. 5 milliGRAM(s) Oral at bedtime PRN Insomnia  ondansetron    Tablet 4 milliGRAM(s) Oral every 8 hours PRN nausea

## 2023-04-08 NOTE — BH INPATIENT PSYCHIATRY PROGRESS NOTE - NSBHASSESSSUMMFT_PSY_ALL_CORE
37F who is domiciled at [facility] with mother, unemployed, single, with PMHx of T1DM, Diabetic neuropathy, HTN, chronic back pain, migraine headaches, PPHx of anxiety/depression, 3 prior psychiatric admissions for suicide attempts as a teenager (once by taking a capful of acetone, another by drinking hydrogen peroxide, another attempt by taking half a bottle of ibuprofen), initially presented for evaluation after ongoing pseudoseizures throughout the day today (upwards of 12 per her report), but was admitted to psychiatric unit for having excessive anxiety along with SI w/plan to overdose on klonopin s/p recent undisclosed SA 2 months ago.     While on the unit the patient has generally appeared euthymic, sociable, and keenly interested in discussing the interpersonal difficulties she is currently experiencing at home. Today she reports continued feelings of anxiety and not provide any suicidal ideations. Her continued external stressors along with her ongoing anxiety, recent SI with plan, previous undisclosed SA and current limited improvement in mood put patient at elevated risk and therefore patient requires continued inpatient psychiatric care for safety and hospitalization.     #ARMAAN   - Continue Lexapro 20 mg  - Consider changing to another antidepressant (previously trailed on Cymbalta and Celexa), no change for now  - Further discuss benzodiazepines with outpatient provider and patient, no change for now  - Consider starting Prazosin for nightmares  - Treatment mainly centered on creating a validating environment for patient, and increasing insight into the nature of her anxieties and interpersonal difficulties    #Agitation  -For episodes of acute agitation can offer PO Haldol 5 mg/Ativan 2 mg/Benadryl 50 mg Q6H PRN. If refusing PO and is in acute risk of harm to self/other, can offer IM Haldol 5 mg/Ativan 2 mg/Benadryl 50 mg Q6H PRN. If given, please repeat EKG and hold antipsychotics if QTC>500     #Diabetes   - DC insulin pump while on inpatient unit  - Lantus 30 at bedtime  - Lispro 8 TID  - Sliding scale insulin    #HTN  - Metoprolol 25 mg BID    #Anemia  - Ferrous sulfate    #Nausea  - PRN Odansetron

## 2023-04-09 LAB
GLUCOSE BLDC GLUCOMTR-MCNC: 183 MG/DL — HIGH (ref 70–99)
GLUCOSE BLDC GLUCOMTR-MCNC: 187 MG/DL — HIGH (ref 70–99)
GLUCOSE BLDC GLUCOMTR-MCNC: 208 MG/DL — HIGH (ref 70–99)
GLUCOSE BLDC GLUCOMTR-MCNC: 254 MG/DL — HIGH (ref 70–99)

## 2023-04-09 RX ADMIN — INSULIN GLARGINE 35 UNIT(S): 100 INJECTION, SOLUTION SUBCUTANEOUS at 20:00

## 2023-04-09 RX ADMIN — Medication 25 MILLIGRAM(S): at 08:02

## 2023-04-09 RX ADMIN — Medication 8 UNIT(S): at 16:25

## 2023-04-09 RX ADMIN — Medication 8 UNIT(S): at 11:12

## 2023-04-09 RX ADMIN — Medication 75 MILLIGRAM(S): at 20:00

## 2023-04-09 RX ADMIN — Medication 325 MILLIGRAM(S): at 08:02

## 2023-04-09 RX ADMIN — Medication 25 MILLIGRAM(S): at 20:00

## 2023-04-09 RX ADMIN — ESCITALOPRAM OXALATE 20 MILLIGRAM(S): 10 TABLET, FILM COATED ORAL at 08:02

## 2023-04-09 RX ADMIN — PANTOPRAZOLE SODIUM 40 MILLIGRAM(S): 20 TABLET, DELAYED RELEASE ORAL at 06:13

## 2023-04-09 RX ADMIN — Medication 4: at 16:25

## 2023-04-09 RX ADMIN — Medication 100 MILLIGRAM(S): at 20:00

## 2023-04-09 RX ADMIN — Medication 1 DROP(S): at 08:01

## 2023-04-09 RX ADMIN — Medication 6: at 07:43

## 2023-04-09 RX ADMIN — Medication 8 UNIT(S): at 07:43

## 2023-04-09 RX ADMIN — Medication 100 MILLIGRAM(S): at 08:02

## 2023-04-09 RX ADMIN — Medication 2: at 11:12

## 2023-04-09 NOTE — BH INPATIENT PSYCHIATRY PROGRESS NOTE - NSBHFUPINTERVALHXFT_PSY_A_CORE
Chart reviewed. Patient seen and evaluated at bedside. No acute overnight events reported.     Patient seen at bedside, noted to be sleeping in bed but wakes up for interviewers. Patient reports that she is okay, reporting that she continues to be with some anxiety, reporting that it is "about the same" since admission. No prns administered overnight. No acute complaints per patient.  Advancement Flap (Double) Text: The defect edges were debeveled with a #15 scalpel blade.  Given the location of the defect and the proximity to free margins a double advancement flap was deemed most appropriate.  Using a sterile surgical marker, the appropriate advancement flaps were drawn incorporating the defect and placing the expected incisions within the relaxed skin tension lines where possible.    The area thus outlined was incised deep to adipose tissue with a #15 scalpel blade.  The skin margins were undermined to an appropriate distance in all directions utilizing iris scissors.

## 2023-04-09 NOTE — BH INPATIENT PSYCHIATRY PROGRESS NOTE - CURRENT MEDICATION
MEDICATIONS  (STANDING):  amitriptyline 75 milliGRAM(s) Oral at bedtime  artificial tears (preservative free) Ophthalmic Solution 1 Drop(s) Both EYES daily  dextrose 5%. 1000 milliLiter(s) (50 mL/Hr) IV Continuous <Continuous>  dextrose 5%. 1000 milliLiter(s) (100 mL/Hr) IV Continuous <Continuous>  dextrose 5%. 1000 milliLiter(s) (50 mL/Hr) IV Continuous <Continuous>  dextrose 5%. 1000 milliLiter(s) (100 mL/Hr) IV Continuous <Continuous>  dextrose 50% Injectable 25 Gram(s) IV Push once  dextrose 50% Injectable 12.5 Gram(s) IV Push once  dextrose 50% Injectable 25 Gram(s) IV Push once  dextrose 50% Injectable 25 Gram(s) IV Push once  dextrose 50% Injectable 12.5 Gram(s) IV Push once  dextrose 50% Injectable 25 Gram(s) IV Push once  doxycycline monohydrate Capsule 100 milliGRAM(s) Oral every 12 hours  escitalopram 20 milliGRAM(s) Oral daily  ferrous    sulfate 325 milliGRAM(s) Oral daily  glucagon  Injectable 1 milliGRAM(s) IntraMuscular once  glucagon  Injectable 1 milliGRAM(s) IntraMuscular once  insulin glargine Injectable (LANTUS) 35 Unit(s) SubCutaneous at bedtime  insulin lispro (ADMELOG) corrective regimen sliding scale   SubCutaneous three times a day before meals  insulin lispro (ADMELOG) corrective regimen sliding scale   SubCutaneous at bedtime  insulin lispro Injectable (ADMELOG) 8 Unit(s) SubCutaneous three times a day before meals  metoprolol tartrate 25 milliGRAM(s) Oral two times a day  pantoprazole    Tablet 40 milliGRAM(s) Oral before breakfast    MEDICATIONS  (PRN):  albuterol    90 MICROgram(s) HFA Inhaler 1 Puff(s) Inhalation four times a day PRN Shortness of Breath  aluminum hydroxide/magnesium hydroxide/simethicone Suspension 30 milliLiter(s) Oral every 6 hours PRN Dyspepsia  dextrose Oral Gel 15 Gram(s) Oral once PRN Blood Glucose LESS THAN 70 milliGRAM(s)/deciliter  dextrose Oral Gel 15 Gram(s) Oral once PRN Blood Glucose LESS THAN 70 milliGRAM(s)/deciliter  haloperidol     Tablet 5 milliGRAM(s) Oral every 6 hours PRN agitation  hydrOXYzine hydrochloride 50 milliGRAM(s) Oral every 6 hours PRN anxiety/psychogenic seizures  ibuprofen  Tablet. 200 milliGRAM(s) Oral every 6 hours PRN Mild Pain (1 - 3)  LORazepam     Tablet 2 milliGRAM(s) Oral every 6 hours PRN agitation  melatonin. 5 milliGRAM(s) Oral at bedtime PRN Insomnia  ondansetron    Tablet 4 milliGRAM(s) Oral every 8 hours PRN nausea

## 2023-04-09 NOTE — BH INPATIENT PSYCHIATRY PROGRESS NOTE - NSBHASSESSSUMMFT_PSY_ALL_CORE
37F who is domiciled at [facility] with mother, unemployed, single, with PMHx of T1DM, Diabetic neuropathy, HTN, chronic back pain, migraine headaches, PPHx of anxiety/depression, 3 prior psychiatric admissions for suicide attempts as a teenager (once by taking a capful of acetone, another by drinking hydrogen peroxide, another attempt by taking half a bottle of ibuprofen), initially presented for evaluation after ongoing pseudoseizures throughout the day today (upwards of 12 per her report), but was admitted to psychiatric unit for having excessive anxiety along with SI w/plan to overdose on klonopin s/p recent undisclosed SA 2 months ago.     While on the unit the patient has generally appeared euthymic, sociable, and keenly interested in discussing the interpersonal difficulties she is currently experiencing at home. Today she reports continued feelings of anxiety and not provide any suicidal ideations. Her continued external stressors along with her ongoing anxiety, recent SI with plan, previous undisclosed SA and current limited improvement in mood put patient at elevated risk and therefore patient requires continued inpatient psychiatric care for safety and hospitalization.     #ARMAAN   - Continue Lexapro 20 mg  - Consider changing to another antidepressant (previously trailed on Cymbalta and Celexa), no change for now  - Further discuss benzodiazepines with outpatient provider and patient, no change for now  - Consider starting Prazosin for nightmares  - Treatment mainly centered on creating a validating environment for patient, and increasing insight into the nature of her anxieties and interpersonal difficulties    #Agitation  -For episodes of acute agitation can offer PO Haldol 5 mg/Ativan 2 mg/Benadryl 50 mg Q6H PRN. If refusing PO and is in acute risk of harm to self/other, can offer IM Haldol 5 mg/Ativan 2 mg/Benadryl 50 mg Q6H PRN. If given, please repeat EKG and hold antipsychotics if QTC>500     #Diabetes   - DC insulin pump while on inpatient unit  - Lantus 30 at bedtime  - Lispro 8 TID  - Sliding scale insulin    #HTN  - Metoprolol 25 mg BID    #Anemia  - Ferrous sulfate    #Nausea  - PRN Odansetron

## 2023-04-09 NOTE — BH INPATIENT PSYCHIATRY PROGRESS NOTE - NSBHCHARTREVIEWVS_PSY_A_CORE FT
Vital Signs Last 24 Hrs  T(C): 36.6 (04-09-23 @ 15:31), Max: 36.6 (04-09-23 @ 15:31)  T(F): 97.9 (04-09-23 @ 15:31), Max: 97.9 (04-09-23 @ 15:31)  HR: 96 (04-09-23 @ 15:31) (96 - 103)  BP: 121/53 (04-09-23 @ 15:31) (119/59 - 121/53)  BP(mean): --  RR: 18 (04-09-23 @ 15:31) (18 - 18)  SpO2: --    Orthostatic VS  04-08-23 @ 08:21  Lying BP: 129/70 HR: --  Sitting BP: --/-- HR: --  Standing BP: --/-- HR: --  Site: --  Mode: --

## 2023-04-09 NOTE — BH INPATIENT PSYCHIATRY PROGRESS NOTE - NSBHMETABOLIC_PSY_ALL_CORE_FT
BMI: BMI (kg/m2): 32.1 (04-06-23 @ 09:18)  HbA1c: A1C with Estimated Average Glucose Result: 9.2 % (04-07-23 @ 08:11)    Glucose: POCT Blood Glucose.: 208 mg/dL (04-09-23 @ 16:21)    BP: 121/53 (04-09-23 @ 15:31) (119/59 - 158/69)  Lipid Panel:

## 2023-04-09 NOTE — BH INPATIENT PSYCHIATRY PROGRESS NOTE - PRN MEDS
MEDICATIONS  (PRN):  albuterol    90 MICROgram(s) HFA Inhaler 1 Puff(s) Inhalation four times a day PRN Shortness of Breath  aluminum hydroxide/magnesium hydroxide/simethicone Suspension 30 milliLiter(s) Oral every 6 hours PRN Dyspepsia  dextrose Oral Gel 15 Gram(s) Oral once PRN Blood Glucose LESS THAN 70 milliGRAM(s)/deciliter  dextrose Oral Gel 15 Gram(s) Oral once PRN Blood Glucose LESS THAN 70 milliGRAM(s)/deciliter  haloperidol     Tablet 5 milliGRAM(s) Oral every 6 hours PRN agitation  hydrOXYzine hydrochloride 50 milliGRAM(s) Oral every 6 hours PRN anxiety/psychogenic seizures  ibuprofen  Tablet. 200 milliGRAM(s) Oral every 6 hours PRN Mild Pain (1 - 3)  LORazepam     Tablet 2 milliGRAM(s) Oral every 6 hours PRN agitation  melatonin. 5 milliGRAM(s) Oral at bedtime PRN Insomnia  ondansetron    Tablet 4 milliGRAM(s) Oral every 8 hours PRN nausea

## 2023-04-10 ENCOUNTER — NON-APPOINTMENT (OUTPATIENT)
Age: 38
End: 2023-04-10

## 2023-04-10 DIAGNOSIS — F44.5 CONVERSION DISORDER WITH SEIZURES OR CONVULSIONS: ICD-10-CM

## 2023-04-10 LAB
GLUCOSE BLDC GLUCOMTR-MCNC: 173 MG/DL — HIGH (ref 70–99)
GLUCOSE BLDC GLUCOMTR-MCNC: 179 MG/DL — HIGH (ref 70–99)
GLUCOSE BLDC GLUCOMTR-MCNC: 183 MG/DL — HIGH (ref 70–99)
GLUCOSE BLDC GLUCOMTR-MCNC: 291 MG/DL — HIGH (ref 70–99)

## 2023-04-10 PROCEDURE — 99231 SBSQ HOSP IP/OBS SF/LOW 25: CPT

## 2023-04-10 RX ADMIN — Medication 25 MILLIGRAM(S): at 08:25

## 2023-04-10 RX ADMIN — Medication 75 MILLIGRAM(S): at 20:15

## 2023-04-10 RX ADMIN — Medication 8 UNIT(S): at 11:32

## 2023-04-10 RX ADMIN — Medication 8 UNIT(S): at 07:28

## 2023-04-10 RX ADMIN — Medication 325 MILLIGRAM(S): at 08:25

## 2023-04-10 RX ADMIN — ESCITALOPRAM OXALATE 20 MILLIGRAM(S): 10 TABLET, FILM COATED ORAL at 08:25

## 2023-04-10 RX ADMIN — Medication 2: at 16:39

## 2023-04-10 RX ADMIN — Medication 100 MILLIGRAM(S): at 08:24

## 2023-04-10 RX ADMIN — Medication 25 MILLIGRAM(S): at 20:15

## 2023-04-10 RX ADMIN — Medication 2: at 07:28

## 2023-04-10 RX ADMIN — PANTOPRAZOLE SODIUM 40 MILLIGRAM(S): 20 TABLET, DELAYED RELEASE ORAL at 07:27

## 2023-04-10 RX ADMIN — Medication 1 DROP(S): at 08:24

## 2023-04-10 RX ADMIN — FLUCONAZOLE 150 MILLIGRAM(S): 150 TABLET ORAL at 10:26

## 2023-04-10 RX ADMIN — Medication 8 UNIT(S): at 16:38

## 2023-04-10 RX ADMIN — Medication 6: at 11:33

## 2023-04-10 RX ADMIN — INSULIN GLARGINE 35 UNIT(S): 100 INJECTION, SOLUTION SUBCUTANEOUS at 20:15
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
## 2023-04-10 NOTE — BH INPATIENT PSYCHIATRY PROGRESS NOTE - NSBHFUPINTERVALHXFT_PSY_A_CORE
Nursing reports no acute events overnight, report that she's been sleeping through the night and has been taking her medications. Per chart review the patient has not required any behavioral PRNS since the last assessment.    Chart reviewed, patient seen and evaluated in AM. On approach, the patient reports that she feels "a little better" since first getting admitted. She spoke with the writer about how the weekend was somewhat difficult because she heard from her mother that her grandfather's health is unstable and that he has hospice involved. Her mother came to visit her and gave a her an Easter card which stated it was "from mom", which also made her sad because it reminded her that her father is no longer able to be part of holiday celebrations. She states that she had urges to call Jesus over the weekend, although she knows that he is not good for her mental health. She attributes these urges to being so used to having him as part of her everyday routine. She states that she feels like she may be ready for discharge soon but she is worried about what Jesus may do/say and worries that this will trigger decompensation of her emotional state. She made a plan with the writer to track the worrisome thoughts she has about what Jesus may do/say, re-evaluate these thoughts and think about what he is likely to do or say, and then have a plan for how she may respond. The patient also reflected on her suicide attempt from a few months ago where she attempted to drink [place] Sol and bleach. She states that this was in regards to distress over Jesus and her father's death, but that she only drank a capful, felt sick (vomited) and then became scared. She states that the action was impulsive, and now looks back on it as "stupid". She states that in part what prevented her from drinking more was the feeling that she is responsible for her mother, and she does not want to abandon her by dying.    Patient endorsed continuing to feel sleepy throughout the day, she often naps after breakfast and lunch. She endorses intact appetite.    Patient reports no suicidal ideation, intent or plan. Denied auditory or visual hallucinations. Denied paranoia. Patient compliant with medications; reports no side effects of medications.

## 2023-04-10 NOTE — BH INPATIENT PSYCHIATRY DISCHARGE NOTE - NSDCCPCAREPLAN_GEN_ALL_CORE_FT
PRINCIPAL DISCHARGE DIAGNOSIS  Diagnosis: ARMAAN (generalized anxiety disorder)  Assessment and Plan of Treatment: Generalized anxiety disorder (ARMAAN) is a condition that causes you to feel worried or nervous for at least 6 months. The anxiety may be much more severe than the event causing it. You may not be able to do your daily activities because of the anxiety.  DISCHARGE INSTRUCTIONS:  Call 911 for any of the following:  You feel like hurting yourself or someone else.  You have chest pain, tightness, or heaviness that may spread to your shoulders, arms, jaw, neck, or back.  Seek care immediately if:  You feel like fainting or are lightheaded or too dizzy to stand up.  Contact your healthcare provider if:  You have new symptoms since your last visit.  Your anxiety keeps you from doing your daily activities, such as self-care, family, or work.  You have problems that you think may be caused by the medicine you are taking.  Your symptoms are getting worse.  You have questions or concerns about your condition or care.  Medicines:  Medicines may be given to relieve anxiety and depression and help you feel calm and relaxed.  Take your medicine as directed. Contact your healthcare provider if you think your medicine is not helping or if you have side effects. Tell him or her if you are allergic to any medicine. Keep a list of the medicines, vitamins, and herbs you take. Include the amounts, and when and why you take them. Bring the list or the pill bottles to follow-up visits. Carry your medicine list with you in case of an emergency.  Follow up with your healthcare provider as directed:  Write down your questions so you remember to ask them during your visits.  Monitor your condition:  Keep a record of the situations that cause your symptoms. Bring the record with you when you see your healthcare provider. Include the following:  What were you doing when the symptoms started?  Had you eaten anything unusual, or taken a new medicine or herbal supplement?  Were you stressed or upset during the time leading up to the attack?  How often do you have symptoms? How long do they last?

## 2023-04-10 NOTE — BH SAFETY PLAN - LOCAL URGENT CARE ADDRESS
[FreeTextEntry1] : [facility]\par \par [facility] [phone] at [address] [place] [id number]\par  [address], [place] [address]

## 2023-04-10 NOTE — BH INPATIENT PSYCHIATRY DISCHARGE NOTE - NSBHDCMEDICALFT_PSY_A_CORE
#Diabetes   - DC insulin pump while on inpatient unit  - Lantus 30 at bedtime  - Lispro 8 TID  - Sliding scale insulin    #HTN  - Metoprolol 25 mg BID    #Anemia  - Ferrous sulfate    #Nausea  - PRN Odansetron

## 2023-04-10 NOTE — BH SAFETY PLAN - LOCAL URGENT CARE NAME
[facility] ([facility])

## 2023-04-10 NOTE — BH SAFETY PLAN - ENVIRONMENT SAFETY 3:
Another way I would make my environment safe would be to call for help once I get into one of these moods.
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
## 2023-04-10 NOTE — BH INPATIENT PSYCHIATRY PROGRESS NOTE - NSBHMETABOLIC_PSY_ALL_CORE_FT
BMI: BMI (kg/m2): 32.1 (04-06-23 @ 09:18)  HbA1c: A1C with Estimated Average Glucose Result: 9.2 % (04-07-23 @ 08:11)    Glucose: POCT Blood Glucose.: 179 mg/dL (04-10-23 @ 06:33)    BP: 97/49 (04-10-23 @ 08:30) (97/49 - 158/69)  Lipid Panel:

## 2023-04-10 NOTE — BH SAFETY PLAN - ENVIRONMENT SAFETY 1:
My environment would be safer if I could make sure I am aware of and stay away from the people, places and things that will trigger me or make me feel unsafe.

## 2023-04-10 NOTE — BH INPATIENT PSYCHIATRY PROGRESS NOTE - NSBHCHARTREVIEWVS_PSY_A_CORE FT
Vital Signs Last 24 Hrs  T(C): 36.7 (04-10-23 @ 08:30), Max: 36.7 (04-10-23 @ 08:30)  T(F): 98.1 (04-10-23 @ 08:30), Max: 98.1 (04-10-23 @ 08:30)  HR: 102 (04-10-23 @ 08:30) (96 - 102)  BP: 97/49 (04-10-23 @ 08:30) (97/49 - 121/53)  BP(mean): --  RR: 18 (04-10-23 @ 08:30) (18 - 18)  SpO2: --
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
## 2023-04-10 NOTE — DISCUSSION/SUMMARY
[FreeTextEntry1] : Dr. Levin reported through an email message on 4/6, that patient was admitted to [facility] due to suicide ideation with plan to overdose on her Klonopin. She reports that patient also disclosed that 2 months ago, she drank bleach and pine salt.

## 2023-04-10 NOTE — BH INPATIENT PSYCHIATRY PROGRESS NOTE - CURRENT MEDICATION
MEDICATIONS  (STANDING):  amitriptyline 75 milliGRAM(s) Oral at bedtime  artificial tears (preservative free) Ophthalmic Solution 1 Drop(s) Both EYES daily  dextrose 5%. 1000 milliLiter(s) (50 mL/Hr) IV Continuous <Continuous>  dextrose 5%. 1000 milliLiter(s) (100 mL/Hr) IV Continuous <Continuous>  dextrose 5%. 1000 milliLiter(s) (50 mL/Hr) IV Continuous <Continuous>  dextrose 5%. 1000 milliLiter(s) (100 mL/Hr) IV Continuous <Continuous>  dextrose 50% Injectable 25 Gram(s) IV Push once  dextrose 50% Injectable 12.5 Gram(s) IV Push once  dextrose 50% Injectable 25 Gram(s) IV Push once  dextrose 50% Injectable 25 Gram(s) IV Push once  dextrose 50% Injectable 12.5 Gram(s) IV Push once  dextrose 50% Injectable 25 Gram(s) IV Push once  escitalopram 20 milliGRAM(s) Oral daily  ferrous    sulfate 325 milliGRAM(s) Oral daily  fluconAZOLE   Tablet 150 milliGRAM(s) Oral once  glucagon  Injectable 1 milliGRAM(s) IntraMuscular once  glucagon  Injectable 1 milliGRAM(s) IntraMuscular once  insulin glargine Injectable (LANTUS) 35 Unit(s) SubCutaneous at bedtime  insulin lispro (ADMELOG) corrective regimen sliding scale   SubCutaneous three times a day before meals  insulin lispro (ADMELOG) corrective regimen sliding scale   SubCutaneous at bedtime  insulin lispro Injectable (ADMELOG) 8 Unit(s) SubCutaneous three times a day before meals  metoprolol tartrate 25 milliGRAM(s) Oral two times a day  pantoprazole    Tablet 40 milliGRAM(s) Oral before breakfast    MEDICATIONS  (PRN):  albuterol    90 MICROgram(s) HFA Inhaler 1 Puff(s) Inhalation four times a day PRN Shortness of Breath  aluminum hydroxide/magnesium hydroxide/simethicone Suspension 30 milliLiter(s) Oral every 6 hours PRN Dyspepsia  dextrose Oral Gel 15 Gram(s) Oral once PRN Blood Glucose LESS THAN 70 milliGRAM(s)/deciliter  dextrose Oral Gel 15 Gram(s) Oral once PRN Blood Glucose LESS THAN 70 milliGRAM(s)/deciliter  haloperidol     Tablet 5 milliGRAM(s) Oral every 6 hours PRN agitation  hydrOXYzine hydrochloride 50 milliGRAM(s) Oral every 6 hours PRN anxiety/psychogenic seizures  ibuprofen  Tablet. 200 milliGRAM(s) Oral every 6 hours PRN Mild Pain (1 - 3)  LORazepam     Tablet 2 milliGRAM(s) Oral every 6 hours PRN agitation  melatonin. 5 milliGRAM(s) Oral at bedtime PRN Insomnia  ondansetron    Tablet 4 milliGRAM(s) Oral every 8 hours PRN nausea

## 2023-04-10 NOTE — BH SAFETY PLAN - WARNING SIGN 1
The warning sign that was the most severe is that I was having suicidal thoughts because a crisis was developing in my assisted living facility.

## 2023-04-11 LAB
GLUCOSE BLDC GLUCOMTR-MCNC: 186 MG/DL — HIGH (ref 70–99)
GLUCOSE BLDC GLUCOMTR-MCNC: 188 MG/DL — HIGH (ref 70–99)
GLUCOSE BLDC GLUCOMTR-MCNC: 209 MG/DL — HIGH (ref 70–99)
GLUCOSE BLDC GLUCOMTR-MCNC: 258 MG/DL — HIGH (ref 70–99)

## 2023-04-11 PROCEDURE — 99231 SBSQ HOSP IP/OBS SF/LOW 25: CPT

## 2023-04-11 RX ADMIN — ESCITALOPRAM OXALATE 20 MILLIGRAM(S): 10 TABLET, FILM COATED ORAL at 09:23

## 2023-04-11 RX ADMIN — Medication 75 MILLIGRAM(S): at 20:16

## 2023-04-11 RX ADMIN — Medication 325 MILLIGRAM(S): at 09:23

## 2023-04-11 RX ADMIN — Medication 2: at 20:17

## 2023-04-11 RX ADMIN — Medication 8 UNIT(S): at 16:40

## 2023-04-11 RX ADMIN — Medication 25 MILLIGRAM(S): at 09:24

## 2023-04-11 RX ADMIN — Medication 2: at 11:21

## 2023-04-11 RX ADMIN — PANTOPRAZOLE SODIUM 40 MILLIGRAM(S): 20 TABLET, DELAYED RELEASE ORAL at 09:24

## 2023-04-11 RX ADMIN — INSULIN GLARGINE 35 UNIT(S): 100 INJECTION, SOLUTION SUBCUTANEOUS at 20:16

## 2023-04-11 RX ADMIN — Medication 2: at 07:34

## 2023-04-11 RX ADMIN — Medication 4: at 16:40

## 2023-04-11 RX ADMIN — Medication 25 MILLIGRAM(S): at 20:16

## 2023-04-11 RX ADMIN — Medication 1 DROP(S): at 09:21

## 2023-04-11 RX ADMIN — Medication 8 UNIT(S): at 07:36

## 2023-04-11 RX ADMIN — Medication 8 UNIT(S): at 11:20
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
## 2023-04-11 NOTE — BH INPATIENT PSYCHIATRY PROGRESS NOTE - NSBHMETABOLIC_PSY_ALL_CORE_FT
BMI: BMI (kg/m2): 32.1 (04-06-23 @ 09:18)  HbA1c: A1C with Estimated Average Glucose Result: 9.2 % (04-07-23 @ 08:11)    Glucose: POCT Blood Glucose.: 209 mg/dL (04-11-23 @ 16:10)    BP: 129/75 (04-11-23 @ 15:45) (97/49 - 151/81)  Lipid Panel:

## 2023-04-11 NOTE — BH INPATIENT PSYCHIATRY PROGRESS NOTE - CURRENT MEDICATION
MEDICATIONS  (STANDING):  amitriptyline 75 milliGRAM(s) Oral at bedtime  artificial tears (preservative free) Ophthalmic Solution 1 Drop(s) Both EYES daily  dextrose 5%. 1000 milliLiter(s) (50 mL/Hr) IV Continuous <Continuous>  dextrose 5%. 1000 milliLiter(s) (50 mL/Hr) IV Continuous <Continuous>  dextrose 5%. 1000 milliLiter(s) (100 mL/Hr) IV Continuous <Continuous>  dextrose 5%. 1000 milliLiter(s) (100 mL/Hr) IV Continuous <Continuous>  dextrose 50% Injectable 25 Gram(s) IV Push once  dextrose 50% Injectable 12.5 Gram(s) IV Push once  dextrose 50% Injectable 25 Gram(s) IV Push once  dextrose 50% Injectable 25 Gram(s) IV Push once  dextrose 50% Injectable 12.5 Gram(s) IV Push once  dextrose 50% Injectable 25 Gram(s) IV Push once  escitalopram 20 milliGRAM(s) Oral daily  ferrous    sulfate 325 milliGRAM(s) Oral daily  glucagon  Injectable 1 milliGRAM(s) IntraMuscular once  glucagon  Injectable 1 milliGRAM(s) IntraMuscular once  insulin glargine Injectable (LANTUS) 35 Unit(s) SubCutaneous at bedtime  insulin lispro (ADMELOG) corrective regimen sliding scale   SubCutaneous three times a day before meals  insulin lispro (ADMELOG) corrective regimen sliding scale   SubCutaneous at bedtime  insulin lispro Injectable (ADMELOG) 8 Unit(s) SubCutaneous three times a day before meals  metoprolol tartrate 25 milliGRAM(s) Oral two times a day  pantoprazole    Tablet 40 milliGRAM(s) Oral before breakfast    MEDICATIONS  (PRN):  albuterol    90 MICROgram(s) HFA Inhaler 1 Puff(s) Inhalation four times a day PRN Shortness of Breath  aluminum hydroxide/magnesium hydroxide/simethicone Suspension 30 milliLiter(s) Oral every 6 hours PRN Dyspepsia  dextrose Oral Gel 15 Gram(s) Oral once PRN Blood Glucose LESS THAN 70 milliGRAM(s)/deciliter  dextrose Oral Gel 15 Gram(s) Oral once PRN Blood Glucose LESS THAN 70 milliGRAM(s)/deciliter  haloperidol     Tablet 5 milliGRAM(s) Oral every 6 hours PRN agitation  hydrOXYzine hydrochloride 50 milliGRAM(s) Oral every 6 hours PRN anxiety/psychogenic seizures  ibuprofen  Tablet. 200 milliGRAM(s) Oral every 6 hours PRN Mild Pain (1 - 3)  LORazepam     Tablet 2 milliGRAM(s) Oral every 6 hours PRN agitation  melatonin. 5 milliGRAM(s) Oral at bedtime PRN Insomnia  ondansetron    Tablet 4 milliGRAM(s) Oral every 8 hours PRN nausea

## 2023-04-11 NOTE — BH INPATIENT PSYCHIATRY PROGRESS NOTE - NSBHATTESTCOMMENTATTENDFT_PSY_A_CORE
Case discussed with the resident and I concur with the findings and plan. Case discussed with resident.  I concur with the findings and plan.

## 2023-04-11 NOTE — ED ADULT NURSE NOTE - SEPSIS REFERENCE DATA FOR CRITERIA 1 WBC
In an effort to ensure that our patients LiveWell, a Team Member has reviewed your chart and identified an opportunity to provide the best care possible. An attempt was made to discuss or schedule overdue Preventive or Disease Management screening.     The Outcome was Contact was not made, letter/portal message sent If you have any questions or need help with scheduling, contact your primary care provider.. Care Gaps include Cervical Cancer Screening and Immunizations.       Abnormal WBC: < 4,000 OR > 12,000

## 2023-04-11 NOTE — BH INPATIENT PSYCHIATRY PROGRESS NOTE - NSBHCHARTREVIEWVS_PSY_A_CORE FT
Vital Signs Last 24 Hrs  T(C): 36.6 (04-11-23 @ 15:45), Max: 36.6 (04-11-23 @ 15:45)  T(F): 97.8 (04-11-23 @ 15:45), Max: 97.8 (04-11-23 @ 15:45)  HR: 94 (04-11-23 @ 15:45) (94 - 100)  BP: 129/75 (04-11-23 @ 15:45) (129/75 - 134/94)  BP(mean): --  RR: 16 (04-11-23 @ 15:45) (16 - 18)  SpO2: --
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
## 2023-04-12 ENCOUNTER — APPOINTMENT (OUTPATIENT)
Dept: NEUROLOGY | Facility: CLINIC | Age: 38
End: 2023-04-12

## 2023-04-12 LAB
GLUCOSE BLDC GLUCOMTR-MCNC: 100 MG/DL — HIGH (ref 70–99)
GLUCOSE BLDC GLUCOMTR-MCNC: 382 MG/DL — HIGH (ref 70–99)
GLUCOSE BLDC GLUCOMTR-MCNC: 410 MG/DL — HIGH (ref 70–99)
GLUCOSE BLDC GLUCOMTR-MCNC: 418 MG/DL — HIGH (ref 70–99)

## 2023-04-12 PROCEDURE — 99231 SBSQ HOSP IP/OBS SF/LOW 25: CPT

## 2023-04-12 RX ORDER — INSULIN LISPRO 100/ML
8 VIAL (ML) SUBCUTANEOUS ONCE
Refills: 0 | Status: COMPLETED | OUTPATIENT
Start: 2023-04-12 | End: 2023-04-12

## 2023-04-12 RX ADMIN — Medication 1 DROP(S): at 08:55

## 2023-04-12 RX ADMIN — Medication 12: at 16:06

## 2023-04-12 RX ADMIN — ESCITALOPRAM OXALATE 20 MILLIGRAM(S): 10 TABLET, FILM COATED ORAL at 08:54

## 2023-04-12 RX ADMIN — Medication 25 MILLIGRAM(S): at 08:54

## 2023-04-12 RX ADMIN — Medication 325 MILLIGRAM(S): at 08:54

## 2023-04-12 RX ADMIN — Medication 200 MILLIGRAM(S): at 20:03

## 2023-04-12 RX ADMIN — Medication 8 UNIT(S): at 11:27

## 2023-04-12 RX ADMIN — PANTOPRAZOLE SODIUM 40 MILLIGRAM(S): 20 TABLET, DELAYED RELEASE ORAL at 06:02

## 2023-04-12 RX ADMIN — Medication 25 MILLIGRAM(S): at 20:02

## 2023-04-12 RX ADMIN — Medication 8 UNIT(S): at 16:57

## 2023-04-12 RX ADMIN — Medication 10: at 11:28

## 2023-04-12 RX ADMIN — Medication 75 MILLIGRAM(S): at 20:02

## 2023-04-12 RX ADMIN — Medication 8 UNIT(S): at 16:05

## 2023-04-12 RX ADMIN — Medication 200 MILLIGRAM(S): at 10:53

## 2023-04-12 RX ADMIN — INSULIN GLARGINE 35 UNIT(S): 100 INJECTION, SOLUTION SUBCUTANEOUS at 20:03

## 2023-04-12 NOTE — BH INPATIENT PSYCHIATRY PROGRESS NOTE - NSBHCHARTREVIEWVS_PSY_A_CORE FT
Vital Signs Last 24 Hrs  T(C): 35.6 (12 Apr 2023 08:35), Max: 36.6 (11 Apr 2023 15:45)  T(F): 96.1 (12 Apr 2023 08:35), Max: 97.8 (11 Apr 2023 15:45)  HR: 120 (12 Apr 2023 08:35) (94 - 120)  BP: 113/74 (12 Apr 2023 08:35) (113/74 - 129/75)  BP(mean): --  RR: 20 (12 Apr 2023 08:35) (16 - 20)  SpO2: --

## 2023-04-12 NOTE — BH INPATIENT PSYCHIATRY PROGRESS NOTE - NSBHASSESSSUMMFT_PSY_ALL_CORE
37F who is domiciled at [facility] with mother, unemployed, single, with PMHx of T1DM, Diabetic neuropathy, HTN, chronic back pain, migraine headaches, PPHx of anxiety/depression, 3 prior psychiatric admissions for suicide attempts as a teenager (once by taking a capful of acetone, another by drinking hydrogen peroxide, another attempt by taking half a bottle of ibuprofen), initially presented for evaluation after ongoing pseudoseizures throughout the day today (upwards of 12 per her report), but was admitted to psychiatric unit for having excessive anxiety along with SI w/plan to overdose on klonopin s/p recent undisclosed SA 2 months ago.     While on the unit the patient has generally appeared euthymic, sociable, and keenly interested in discussing the interpersonal difficulties she is currently experiencing at home. While on the unit continued feelings of anxiety and not provide any suicidal ideations. Today on 04/11 the patient reports feeling less anxious and despairing about life than on initial intake. She states that she feels more equipped to deal with her interpersonal issues at [facility], and that she feels comfortable to be discharged on Thursday. Her continued external stressors along with her ongoing anxiety, recent SI with plan, previous undisclosed SA and current limited improvement in mood put patient at elevated risk and therefore patient requires continued inpatient psychiatric care for safety and hospitalization.    4-:  Patient is much improved and feels like she is "stabilized on the medicine".  She remains anxious about returning home to the stresses and dangers she perceives there but is demonstrating good insight into her behavior.   She is discussing strategies and coping mechanisms with staff and peers.  #ARMAAN   - Continue Lexapro 20 mg  - Consider changing to another antidepressant (previously trailed on Cymbalta and Celexa), no change for now  - Further discuss benzodiazepines with outpatient provider and patient, no change for now  - Consider starting Prazosin for nightmares  - Treatment mainly centered on creating a validating environment for patient, and increasing insight into the nature of her anxieties and interpersonal difficulties  - Follow up on assignment to track anxious thoughts about boyfriend, reframe those thoughts, and think of healthy responses    #Agitation  -For episodes of acute agitation can offer PO Haldol 5 mg/Ativan 2 mg/Benadryl 50 mg Q6H PRN. If refusing PO and is in acute risk of harm to self/other, can offer IM Haldol 5 mg/Ativan 2 mg/Benadryl 50 mg Q6H PRN. If given, please repeat EKG and hold antipsychotics if QTC>500     #Diabetes   - DC insulin pump while on inpatient unit  - Lantus 30 at bedtime  - Lispro 8 TID  - Sliding scale insulin    #HTN  - Metoprolol 25 mg BID    #Anemia  - Ferrous sulfate    #Nausea  - PRN Odansetron

## 2023-04-12 NOTE — BH INPATIENT PSYCHIATRY PROGRESS NOTE - NSBHMETABOLIC_PSY_ALL_CORE_FT
BMI: BMI (kg/m2): 32.1 (04-06-23 @ 09:18)  HbA1c: A1C with Estimated Average Glucose Result: 9.2 % (04-07-23 @ 08:11)    Glucose: POCT Blood Glucose.: 382 mg/dL (04-12-23 @ 10:49)    BP: 113/74 (04-12-23 @ 08:35) (97/49 - 151/81)  Lipid Panel:

## 2023-04-12 NOTE — ED PROVIDER NOTE - ATTENDING WITH...
Pre-Procedure Local Anesthetic Only     Procedure date: 4/12/2023    Planned Procedure:  Procedure: Peripheral nerve stimulator, SPR, one lead,  Right medial cluneal nerve      Informed Consent was obtained, patient or responsible party denies additional questions regarding procedure.    Visit Vitals  /62 (BP Location: RUE - Right upper extremity, Patient Position: Sitting)   Pulse 65   Temp 98 °F (36.7 °C) (Temporal)   Resp 18   Ht 5' 6\" (1.676 m)   Wt 97.1 kg (214 lb)   SpO2 96%   BMI 34.54 kg/m²     Constitutional: No acute distress       OTHER FINDINGS:  • Patient asked to hold any blood thinning medications for procedure: No, okay to continue taking per proceduralist   • History of any bleeding disorders: denies    • Reviewed pertinent lab/diagnostic tests:     Lab Results   Component Value Date    WBC 6.3 04/07/2023    HCT 38.5 04/07/2023    HGB 12.7 04/07/2023     04/07/2023   ,   Lab Results   Component Value Date    SODIUM 141 01/10/2023    POTASSIUM 3.6 01/10/2023    CHLORIDE 105 01/10/2023    CO2 30 01/10/2023    BUN 13 01/10/2023    CREATININE 0.73 01/10/2023    GLUCOSE 95 01/10/2023       Assessing Provider: HEIDY Cerna                         Time: 9:30 AM       Resident

## 2023-04-12 NOTE — BH INPATIENT PSYCHIATRY PROGRESS NOTE - CURRENT MEDICATION
MEDICATIONS  (STANDING):  amitriptyline 75 milliGRAM(s) Oral at bedtime  artificial tears (preservative free) Ophthalmic Solution 1 Drop(s) Both EYES daily  dextrose 5%. 1000 milliLiter(s) (50 mL/Hr) IV Continuous <Continuous>  dextrose 5%. 1000 milliLiter(s) (100 mL/Hr) IV Continuous <Continuous>  dextrose 5%. 1000 milliLiter(s) (50 mL/Hr) IV Continuous <Continuous>  dextrose 5%. 1000 milliLiter(s) (100 mL/Hr) IV Continuous <Continuous>  dextrose 50% Injectable 25 Gram(s) IV Push once  dextrose 50% Injectable 12.5 Gram(s) IV Push once  dextrose 50% Injectable 25 Gram(s) IV Push once  dextrose 50% Injectable 25 Gram(s) IV Push once  dextrose 50% Injectable 12.5 Gram(s) IV Push once  dextrose 50% Injectable 25 Gram(s) IV Push once  escitalopram 20 milliGRAM(s) Oral daily  ferrous    sulfate 325 milliGRAM(s) Oral daily  glucagon  Injectable 1 milliGRAM(s) IntraMuscular once  glucagon  Injectable 1 milliGRAM(s) IntraMuscular once  insulin glargine Injectable (LANTUS) 35 Unit(s) SubCutaneous at bedtime  insulin lispro (ADMELOG) corrective regimen sliding scale   SubCutaneous three times a day before meals  insulin lispro (ADMELOG) corrective regimen sliding scale   SubCutaneous at bedtime  insulin lispro Injectable (ADMELOG) 8 Unit(s) SubCutaneous three times a day before meals  metoprolol tartrate 25 milliGRAM(s) Oral two times a day  pantoprazole    Tablet 40 milliGRAM(s) Oral before breakfast    MEDICATIONS  (PRN):  albuterol    90 MICROgram(s) HFA Inhaler 1 Puff(s) Inhalation four times a day PRN Shortness of Breath  aluminum hydroxide/magnesium hydroxide/simethicone Suspension 30 milliLiter(s) Oral every 6 hours PRN Dyspepsia  dextrose Oral Gel 15 Gram(s) Oral once PRN Blood Glucose LESS THAN 70 milliGRAM(s)/deciliter  dextrose Oral Gel 15 Gram(s) Oral once PRN Blood Glucose LESS THAN 70 milliGRAM(s)/deciliter  haloperidol     Tablet 5 milliGRAM(s) Oral every 6 hours PRN agitation  hydrOXYzine hydrochloride 50 milliGRAM(s) Oral every 6 hours PRN anxiety/psychogenic seizures  ibuprofen  Tablet. 200 milliGRAM(s) Oral every 6 hours PRN Mild Pain (1 - 3)  LORazepam     Tablet 2 milliGRAM(s) Oral every 6 hours PRN agitation  melatonin. 5 milliGRAM(s) Oral at bedtime PRN Insomnia  ondansetron    Tablet 4 milliGRAM(s) Oral every 8 hours PRN nausea

## 2023-04-12 NOTE — BH INPATIENT PSYCHIATRY PROGRESS NOTE - NSBHFUPINTERVALHXFT_PSY_A_CORE
Nursing reports no acute events overnight, report that she's been sleeping through the night and has been taking her medications.     Patient remains euthymic but anxious today.  She feels her medications are stabilized  and that it would be safe for her to leave  in the next couple of days.  Denies any suicidal thoughts or thoughts of self harm and now thinks that when she returns to the stresses of her environment  she will be able to manage them without thoughts of self-harm or lapses in judgment.

## 2023-04-12 NOTE — BH INPATIENT PSYCHIATRY PROGRESS NOTE - NSBHATTESTBILLING_PSY_A_CORE
80690-Mckhvctrat OBS or IP - low complexity OR 25-34 mins
76879-Gtgeiubmbk OBS or IP - low complexity OR 25-34 mins
59158-Sybutxaabr OBS or IP - low complexity OR 25-34 mins
30322-Bxbbmjbbcw OBS or IP - low complexity OR 25-34 mins
49938-Otfmugzamv OBS or IP - low complexity OR 25-34 mins
64672-Dlwxsikcsd OBS or IP - low complexity OR 25-34 mins

## 2023-04-13 ENCOUNTER — NON-APPOINTMENT (OUTPATIENT)
Age: 38
End: 2023-04-13

## 2023-04-13 LAB
GLUCOSE BLDC GLUCOMTR-MCNC: 144 MG/DL — HIGH (ref 70–99)
GLUCOSE BLDC GLUCOMTR-MCNC: 210 MG/DL — HIGH (ref 70–99)
GLUCOSE BLDC GLUCOMTR-MCNC: 217 MG/DL — HIGH (ref 70–99)
GLUCOSE BLDC GLUCOMTR-MCNC: 240 MG/DL — HIGH (ref 70–99)
GLUCOSE BLDC GLUCOMTR-MCNC: 247 MG/DL — HIGH (ref 70–99)
GLUCOSE BLDC GLUCOMTR-MCNC: 259 MG/DL — HIGH (ref 70–99)
GLUCOSE BLDC GLUCOMTR-MCNC: 42 MG/DL — CRITICAL LOW (ref 70–99)
GLUCOSE BLDC GLUCOMTR-MCNC: 90 MG/DL — SIGNIFICANT CHANGE UP (ref 70–99)
SARS-COV-2 RNA SPEC QL NAA+PROBE: SIGNIFICANT CHANGE UP

## 2023-04-13 PROCEDURE — 99231 SBSQ HOSP IP/OBS SF/LOW 25: CPT

## 2023-04-13 RX ORDER — PANTOPRAZOLE SODIUM 20 MG/1
1 TABLET, DELAYED RELEASE ORAL
Qty: 14 | Refills: 0
Start: 2023-04-13 | End: 2023-04-26

## 2023-04-13 RX ORDER — ONDANSETRON 8 MG/1
1 TABLET, FILM COATED ORAL
Qty: 6 | Refills: 0
Start: 2023-04-13 | End: 2023-04-26

## 2023-04-13 RX ORDER — ONDANSETRON 8 MG/1
1 TABLET, FILM COATED ORAL
Qty: 42 | Refills: 0
Start: 2023-04-13 | End: 2023-04-26

## 2023-04-13 RX ORDER — OMEPRAZOLE 10 MG/1
1 CAPSULE, DELAYED RELEASE ORAL
Qty: 0 | Refills: 0 | DISCHARGE

## 2023-04-13 RX ORDER — LANOLIN ALCOHOL/MO/W.PET/CERES
1 CREAM (GRAM) TOPICAL
Qty: 14 | Refills: 0
Start: 2023-04-13 | End: 2023-04-26

## 2023-04-13 RX ORDER — HYDROXYZINE HCL 10 MG
1 TABLET ORAL
Qty: 56 | Refills: 0
Start: 2023-04-13 | End: 2023-04-26

## 2023-04-13 RX ORDER — METOPROLOL TARTRATE 50 MG
1 TABLET ORAL
Qty: 0 | Refills: 0 | DISCHARGE

## 2023-04-13 RX ORDER — AMITRIPTYLINE HCL 25 MG
1 TABLET ORAL
Qty: 0 | Refills: 0 | DISCHARGE

## 2023-04-13 RX ORDER — ESCITALOPRAM OXALATE 10 MG/1
1 TABLET, FILM COATED ORAL
Qty: 0 | Refills: 1 | DISCHARGE

## 2023-04-13 RX ORDER — ACETAMINOPHEN 500 MG
2 TABLET ORAL
Qty: 42 | Refills: 0
Start: 2023-04-13 | End: 2023-04-26

## 2023-04-13 RX ORDER — ESCITALOPRAM OXALATE 10 MG/1
1 TABLET, FILM COATED ORAL
Qty: 14 | Refills: 1
Start: 2023-04-13 | End: 2023-05-10

## 2023-04-13 RX ORDER — METOPROLOL TARTRATE 50 MG
1 TABLET ORAL
Qty: 28 | Refills: 0
Start: 2023-04-13 | End: 2023-04-26

## 2023-04-13 RX ORDER — ALBUTEROL 90 UG/1
1 AEROSOL, METERED ORAL
Qty: 1 | Refills: 0
Start: 2023-04-13 | End: 2023-04-26

## 2023-04-13 RX ORDER — AMITRIPTYLINE HCL 25 MG
1 TABLET ORAL
Qty: 14 | Refills: 0
Start: 2023-04-13 | End: 2023-04-26

## 2023-04-13 RX ORDER — FERROUS SULFATE 325(65) MG
1 TABLET ORAL
Qty: 14 | Refills: 0
Start: 2023-04-13 | End: 2023-04-26

## 2023-04-13 RX ADMIN — Medication 8 UNIT(S): at 11:18

## 2023-04-13 RX ADMIN — Medication 25 MILLIGRAM(S): at 08:53

## 2023-04-13 RX ADMIN — ESCITALOPRAM OXALATE 20 MILLIGRAM(S): 10 TABLET, FILM COATED ORAL at 08:53

## 2023-04-13 RX ADMIN — INSULIN GLARGINE 35 UNIT(S): 100 INJECTION, SOLUTION SUBCUTANEOUS at 20:13

## 2023-04-13 RX ADMIN — Medication 325 MILLIGRAM(S): at 08:53

## 2023-04-13 RX ADMIN — Medication 1 DROP(S): at 08:53

## 2023-04-13 RX ADMIN — Medication 75 MILLIGRAM(S): at 20:14

## 2023-04-13 RX ADMIN — Medication 8 UNIT(S): at 08:09

## 2023-04-13 RX ADMIN — Medication 2: at 20:14

## 2023-04-13 RX ADMIN — Medication 4: at 08:10

## 2023-04-13 RX ADMIN — Medication 8 UNIT(S): at 16:11

## 2023-04-13 RX ADMIN — Medication 25 MILLIGRAM(S): at 20:14

## 2023-04-13 RX ADMIN — PANTOPRAZOLE SODIUM 40 MILLIGRAM(S): 20 TABLET, DELAYED RELEASE ORAL at 06:31

## 2023-04-13 RX ADMIN — Medication 4: at 16:12

## 2023-04-13 RX ADMIN — Medication 200 MILLIGRAM(S): at 01:01

## 2023-04-13 RX ADMIN — Medication 4: at 11:18

## 2023-04-13 NOTE — BH INPATIENT PSYCHIATRY PROGRESS NOTE - NSDCCRITERIA_PSY_ALL_CORE
When patient is able to better safety plan

## 2023-04-13 NOTE — BH INPATIENT PSYCHIATRY PROGRESS NOTE - NSBHMSEKNOWHOW_PSY_ALL_CORE
Current Events/Vocabulary

## 2023-04-13 NOTE — BH DISCHARGE NOTE NURSING/SOCIAL WORK/PSYCH REHAB - NSCDUDCCRISIS_PSY_A_CORE
[facility] Well  1 (965) Critical access hospitalWELL ([phone])  Text "WELL" to 19656  Website: www.WatchFrog.[employer]/.National Suicide Prevention Lifeline 5 (305) 421-8864(970) 113-3898/988 Suicide and Crisis Lifeline [facility] Well  1 (679) [facility] ([phone])  Text "WELL" to 89128  Website: www.Gr[employer]In[employer]/.National Suicide Prevention Lifeline 0 (996) 258-1632/.  [employer]et  1 (226) LIFENET (186-6889)/988 Suicide and Crisis Lifeline

## 2023-04-13 NOTE — BH INPATIENT PSYCHIATRY PROGRESS NOTE - NSBHMSETHTCONTENT_PSY_A_CORE
Preoccupations/Suicidality

## 2023-04-13 NOTE — BH INPATIENT PSYCHIATRY PROGRESS NOTE - NSICDXBHSECONDARYDX_PSY_ALL_CORE
Cluster B personality disorder in adult   F60.9  
Cluster B personality disorder in adult   F60.9  Psychogenic nonepileptic seizure   F44.5  
Cluster B personality disorder in adult   F60.9  

## 2023-04-13 NOTE — BH INPATIENT PSYCHIATRY PROGRESS NOTE - NSBHFUPINTERVALHXFT_PSY_A_CORE
Nursing reports no acute events overnight, report that she's been sleeping through the night and has been taking her medications. Per nursing report and chart review, patient's glucose levels yesterday were somewhat erratic, ranging from 42 to 418.    Patient interviewed in the morning. She remains euthymic, well-related, and future oriented. She states that she is looking forward to being discharged so that she can see her mother. She feels ready to leave tomorrow She reports having a disagreement with her mother yesterday about Jesus, but that she feels that they left things on a good note. She states that she has been telling him while he has been calling the unit to leave her alone, and she remains committed to create distance from him after discharge. She states that her anxiety has decreased and she denies any thoughts of self harm or suicidality.

## 2023-04-13 NOTE — BH DISCHARGE NOTE NURSING/SOCIAL WORK/PSYCH REHAB - PATIENT PORTAL LINK FT
You can access the FollowMyHealth Patient Portal offered by [facility] by registering at the following website: [email or web address]. By joining [facility]’s FollowMyHealth portal, you will also be able to view your health information using other applications (apps) compatible with our system.

## 2023-04-13 NOTE — BH INPATIENT PSYCHIATRY PROGRESS NOTE - NSBHCHARTREVIEWVS_PSY_A_CORE FT
Vital Signs Last 24 Hrs  T(C): 36.6 (04-13-23 @ 08:36), Max: 36.6 (04-13-23 @ 08:36)  T(F): 97.8 (04-13-23 @ 08:36), Max: 97.8 (04-13-23 @ 08:36)  HR: 108 (04-13-23 @ 08:36) (99 - 108)  BP: 133/68 (04-13-23 @ 08:36) (133/60 - 133/68)  BP(mean): --  RR: 18 (04-13-23 @ 08:36) (16 - 18)  SpO2: --

## 2023-04-13 NOTE — BH INPATIENT PSYCHIATRY PROGRESS NOTE - NSBHASSESSSUMMFT_PSY_ALL_CORE
37F who is domiciled at [facility] with mother, unemployed, single, with PMHx of T1DM, Diabetic neuropathy, HTN, chronic back pain, migraine headaches, PPHx of anxiety/depression, 3 prior psychiatric admissions for suicide attempts as a teenager (once by taking a capful of acetone, another by drinking hydrogen peroxide, another attempt by taking half a bottle of ibuprofen), initially presented for evaluation after ongoing pseudoseizures throughout the day today (upwards of 12 per her report), but was admitted to psychiatric unit for having excessive anxiety along with SI w/plan to overdose on klonopin s/p recent undisclosed SA 2 months ago.     While on the unit the patient has generally appeared euthymic, sociable, and keenly interested in discussing the interpersonal difficulties she is currently experiencing at home. While on the unit continued feelings of anxiety and not provide any suicidal ideations. Today on 04/13 the patient reports feeling well, and looking forward to being discharged tomorrow. She continues to state that she feels more equipped to deal with her interpersonal issues at [facility]. Given patient's improved insight, her improved ability to safety plan, and her consistent safe behavior and denial of urges to attempt suicide or self harm she is ready to be discharged to [facility] tomorrow.    #ARMAAN   - Continue Lexapro 20 mg  - Consider changing to another antidepressant (previously trailed on Cymbalta and Celexa), no change for now  - Further discuss benzodiazepines with outpatient provider and patient, no change for now  - Consider starting Prazosin for nightmares  - Treatment mainly centered on creating a validating environment for patient, and increasing insight into the nature of her anxieties and interpersonal difficulties  - Follow up on assignment to track anxious thoughts about boyfriend, reframe those thoughts, and think of healthy responses    #Agitation  -For episodes of acute agitation can offer PO Haldol 5 mg/Ativan 2 mg/Benadryl 50 mg Q6H PRN. If refusing PO and is in acute risk of harm to self/other, can offer IM Haldol 5 mg/Ativan 2 mg/Benadryl 50 mg Q6H PRN. If given, please repeat EKG and hold antipsychotics if QTC>500     #Diabetes   - DC insulin pump while on inpatient unit  - Lantus 30 at bedtime  - Lispro 8 TID  - Sliding scale insulin    #HTN  - Metoprolol 25 mg BID    #Anemia  - Ferrous sulfate    #Nausea  - PRN Odansetron

## 2023-04-13 NOTE — BH INPATIENT PSYCHIATRY PROGRESS NOTE - NSICDXBHPRIMARYDX_PSY_ALL_CORE
Gastritis   K29.70  
ARMAAN (generalized anxiety disorder)   F41.1  

## 2023-04-13 NOTE — BH INPATIENT PSYCHIATRY PROGRESS NOTE - NSBHFUPINTERVALCCFT_PSY_A_CORE
"I am nervous but  I am getting there.  I am almost ready to go"
"I just want to go home"
"okay"
"okay"
"I know he's no good for me"
"I feel a little better"
"I'm feeling better"

## 2023-04-13 NOTE — BH INPATIENT PSYCHIATRY PROGRESS NOTE - NSBHCONSBHPROVDETAILS_PSY_A_CORE  FT
Spoke with Dr. Levin

## 2023-04-14 ENCOUNTER — NON-APPOINTMENT (OUTPATIENT)
Age: 38
End: 2023-04-14

## 2023-04-14 ENCOUNTER — INPATIENT (INPATIENT)
Facility: HOSPITAL | Age: 38
LOS: 11 days | Discharge: HOME | DRG: 756 | End: 2023-04-26
Attending: PSYCHIATRY & NEUROLOGY | Admitting: PSYCHIATRY & NEUROLOGY
Payer: MEDICAID

## 2023-04-14 VITALS
HEART RATE: 108 BPM | SYSTOLIC BLOOD PRESSURE: 142 MMHG | TEMPERATURE: 98 F | DIASTOLIC BLOOD PRESSURE: 80 MMHG | RESPIRATION RATE: 16 BRPM

## 2023-04-14 VITALS
WEIGHT: 205.03 LBS | HEIGHT: 64 IN | OXYGEN SATURATION: 97 % | DIASTOLIC BLOOD PRESSURE: 95 MMHG | TEMPERATURE: 98 F | SYSTOLIC BLOOD PRESSURE: 157 MMHG | RESPIRATION RATE: 20 BRPM | HEART RATE: 111 BPM

## 2023-04-14 LAB
ANION GAP SERPL CALC-SCNC: 9 MMOL/L — SIGNIFICANT CHANGE UP (ref 7–14)
APAP SERPL-MCNC: <5 UG/ML — LOW (ref 10–30)
APPEARANCE UR: CLEAR — SIGNIFICANT CHANGE UP
BASOPHILS # BLD AUTO: 0.06 K/UL — SIGNIFICANT CHANGE UP (ref 0–0.2)
BASOPHILS NFR BLD AUTO: 0.5 % — SIGNIFICANT CHANGE UP (ref 0–1)
BILIRUB UR-MCNC: NEGATIVE — SIGNIFICANT CHANGE UP
BUN SERPL-MCNC: 11 MG/DL — SIGNIFICANT CHANGE UP (ref 10–20)
CALCIUM SERPL-MCNC: 9.6 MG/DL — SIGNIFICANT CHANGE UP (ref 8.4–10.5)
CHLORIDE SERPL-SCNC: 99 MMOL/L — SIGNIFICANT CHANGE UP (ref 98–110)
CO2 SERPL-SCNC: 27 MMOL/L — SIGNIFICANT CHANGE UP (ref 17–32)
COLOR SPEC: YELLOW — SIGNIFICANT CHANGE UP
CREAT SERPL-MCNC: 0.7 MG/DL — SIGNIFICANT CHANGE UP (ref 0.7–1.5)
DIFF PNL FLD: NEGATIVE — SIGNIFICANT CHANGE UP
EGFR: 114 ML/MIN/1.73M2 — SIGNIFICANT CHANGE UP
EOSINOPHIL # BLD AUTO: 0.13 K/UL — SIGNIFICANT CHANGE UP (ref 0–0.7)
EOSINOPHIL NFR BLD AUTO: 1.1 % — SIGNIFICANT CHANGE UP (ref 0–8)
ETHANOL SERPL-MCNC: <10 MG/DL — SIGNIFICANT CHANGE UP
GLUCOSE BLDC GLUCOMTR-MCNC: 277 MG/DL — HIGH (ref 70–99)
GLUCOSE SERPL-MCNC: 213 MG/DL — HIGH (ref 70–99)
GLUCOSE UR QL: 500 MG/DL
HCT VFR BLD CALC: 41.4 % — SIGNIFICANT CHANGE UP (ref 37–47)
HGB BLD-MCNC: 13.2 G/DL — SIGNIFICANT CHANGE UP (ref 12–16)
IMM GRANULOCYTES NFR BLD AUTO: 0.4 % — HIGH (ref 0.1–0.3)
KETONES UR-MCNC: ABNORMAL
LEUKOCYTE ESTERASE UR-ACNC: NEGATIVE — SIGNIFICANT CHANGE UP
LYMPHOCYTES # BLD AUTO: 2.77 K/UL — SIGNIFICANT CHANGE UP (ref 1.2–3.4)
LYMPHOCYTES # BLD AUTO: 23.8 % — SIGNIFICANT CHANGE UP (ref 20.5–51.1)
MCHC RBC-ENTMCNC: 26.6 PG — LOW (ref 27–31)
MCHC RBC-ENTMCNC: 31.9 G/DL — LOW (ref 32–37)
MCV RBC AUTO: 83.3 FL — SIGNIFICANT CHANGE UP (ref 81–99)
MONOCYTES # BLD AUTO: 0.88 K/UL — HIGH (ref 0.1–0.6)
MONOCYTES NFR BLD AUTO: 7.6 % — SIGNIFICANT CHANGE UP (ref 1.7–9.3)
NEUTROPHILS # BLD AUTO: 7.73 K/UL — HIGH (ref 1.4–6.5)
NEUTROPHILS NFR BLD AUTO: 66.6 % — SIGNIFICANT CHANGE UP (ref 42.2–75.2)
NITRITE UR-MCNC: NEGATIVE — SIGNIFICANT CHANGE UP
NRBC # BLD: 0 /100 WBCS — SIGNIFICANT CHANGE UP (ref 0–0)
PH UR: 5 — SIGNIFICANT CHANGE UP (ref 5–8)
PLATELET # BLD AUTO: 417 K/UL — HIGH (ref 130–400)
PMV BLD: 9.6 FL — SIGNIFICANT CHANGE UP (ref 7.4–10.4)
POTASSIUM SERPL-MCNC: 4.6 MMOL/L — SIGNIFICANT CHANGE UP (ref 3.5–5)
POTASSIUM SERPL-SCNC: 4.6 MMOL/L — SIGNIFICANT CHANGE UP (ref 3.5–5)
PROT UR-MCNC: 30 MG/DL
RBC # BLD: 4.97 M/UL — SIGNIFICANT CHANGE UP (ref 4.2–5.4)
RBC # FLD: 20.2 % — HIGH (ref 11.5–14.5)
SALICYLATES SERPL-MCNC: <0.3 MG/DL — LOW (ref 4–30)
SODIUM SERPL-SCNC: 135 MMOL/L — SIGNIFICANT CHANGE UP (ref 135–146)
SP GR SPEC: >=1.03 (ref 1.01–1.03)
UROBILINOGEN FLD QL: 0.2 MG/DL — SIGNIFICANT CHANGE UP
WBC # BLD: 11.62 K/UL — HIGH (ref 4.8–10.8)
WBC # FLD AUTO: 11.62 K/UL — HIGH (ref 4.8–10.8)

## 2023-04-14 PROCEDURE — 99285 EMERGENCY DEPT VISIT HI MDM: CPT

## 2023-04-14 PROCEDURE — 99238 HOSP IP/OBS DSCHRG MGMT 30/<: CPT

## 2023-04-14 RX ORDER — PANTOPRAZOLE SODIUM 20 MG/1
1 TABLET, DELAYED RELEASE ORAL
Qty: 14 | Refills: 0
Start: 2023-04-14 | End: 2023-04-27

## 2023-04-14 RX ORDER — ALBUTEROL 90 UG/1
1 AEROSOL, METERED ORAL
Qty: 1 | Refills: 0
Start: 2023-04-14 | End: 2023-04-27

## 2023-04-14 RX ORDER — METOPROLOL TARTRATE 50 MG
1 TABLET ORAL
Qty: 28 | Refills: 0
Start: 2023-04-14 | End: 2023-04-27

## 2023-04-14 RX ORDER — ACETAMINOPHEN 500 MG
2 TABLET ORAL
Qty: 42 | Refills: 0
Start: 2023-04-14 | End: 2023-04-27

## 2023-04-14 RX ORDER — ESCITALOPRAM OXALATE 10 MG/1
1 TABLET, FILM COATED ORAL
Qty: 14 | Refills: 1
Start: 2023-04-14 | End: 2023-05-11

## 2023-04-14 RX ORDER — HYDROXYZINE HCL 10 MG
1 TABLET ORAL
Qty: 56 | Refills: 0
Start: 2023-04-14 | End: 2023-04-27

## 2023-04-14 RX ORDER — FERROUS SULFATE 325(65) MG
1 TABLET ORAL
Qty: 14 | Refills: 0
Start: 2023-04-14 | End: 2023-04-27

## 2023-04-14 RX ORDER — AMITRIPTYLINE HCL 25 MG
1 TABLET ORAL
Qty: 14 | Refills: 0
Start: 2023-04-14 | End: 2023-04-27

## 2023-04-14 RX ORDER — ONDANSETRON 8 MG/1
1 TABLET, FILM COATED ORAL
Qty: 6 | Refills: 0
Start: 2023-04-14 | End: 2023-04-27

## 2023-04-14 RX ORDER — LANOLIN ALCOHOL/MO/W.PET/CERES
1 CREAM (GRAM) TOPICAL
Qty: 14 | Refills: 0
Start: 2023-04-14 | End: 2023-04-27

## 2023-04-14 RX ADMIN — Medication 1 DROP(S): at 08:23

## 2023-04-14 RX ADMIN — Medication 6: at 07:39

## 2023-04-14 RX ADMIN — Medication 25 MILLIGRAM(S): at 08:22

## 2023-04-14 RX ADMIN — PANTOPRAZOLE SODIUM 40 MILLIGRAM(S): 20 TABLET, DELAYED RELEASE ORAL at 08:22

## 2023-04-14 RX ADMIN — Medication 8 UNIT(S): at 07:38

## 2023-04-14 RX ADMIN — Medication 325 MILLIGRAM(S): at 08:22

## 2023-04-14 RX ADMIN — ESCITALOPRAM OXALATE 20 MILLIGRAM(S): 10 TABLET, FILM COATED ORAL at 08:22

## 2023-04-14 NOTE — CHART NOTE - NSCHARTNOTEFT_GEN_A_CORE
Patient's mood has improved since admission. Insight and judgement have improved. Denies depressed mood, elevated mood, or irritability. Patient endorsed good sleep and good appetite. Patient denies suicidal ideation or homicidal ideation. Denies AVH, denies paranoid thoughts. Patient does not appear to be of imminent danger to self or others at this time. Denies SI/HI. Compliant with medication, denies significant side effects.    Patient is suitable for discharge today.

## 2023-04-14 NOTE — ED PROVIDER NOTE - PHYSICAL EXAMINATION
GENERAL: Well-nourished, Well-developed. NAD.  HEAD: NCAT  Neck: Supple. FROM  CVS: Normal S1,S2. No murmurs appreciated on auscultation   RESP: No use of accessory muscles. Chest rise symmetrical with good expansion. Lungs clear to auscultation B/L. No wheezing, rales, or rhonchi auscultated.  Skin: Warm, Dry. No rashes or lesions. Good cap refill < 2 sec B/L.  Neuro: AA&O x 3. Sensation grossly intact. Strength 5/5 B/L. Gait within normal limits  Psych:  Appropriate mood and affect. Cooperative. Calm

## 2023-04-14 NOTE — ED ADULT NURSE REASSESSMENT NOTE - NS ED NURSE REASSESS COMMENT FT1
Patient requesting to keep insulin pump on during treatment in ED. TORY Jorge made aware and approved

## 2023-04-14 NOTE — ED PROVIDER NOTE - ATTENDING APP SHARED VISIT CONTRIBUTION OF CARE
36 yo F pmh of DMI, HTN, anxiety, depression presents with suicidal ideations. Patient was discharged from psychiatry earlier today. States that she went back to [facility]. Wile there started to get worsening anxiety, reports having a few pseudoseizure episodes and started to have suicidal ideations. no homicidal ideations.     CONSTITUTIONAL: Well-developed; well-nourished; in no acute distress.   SKIN: warm, dry  HEAD: Normocephalic; atraumatic.  EYES: PERRL, EOMI, no conjunctival erythema  ENT: No nasal discharge; airway clear.  NECK: Supple; non tender.  CARD: S1, S2 normal;  Regular rate and rhythm.   RESP: No wheezes, rales or rhonchi.  ABD: soft non tender, non distended, no rebound or guarding  EXT: Normal ROM.  5/5 strength in all 4 extremities   LYMPH: No acute cervical adenopathy.  NEURO: Alert, oriented, grossly unremarkable. neurovascularly intact  PSYCH: Cooperative, appropriate. + anxiety, + suicidal ideations, no homicidal ideations.

## 2023-04-14 NOTE — ED PROVIDER NOTE - OBJECTIVE STATEMENT
37-year-old female past medical history of anxiety, depression, diabetes, pseudoseizures presenting to the ED for evaluation of anxiety and depression, states she is feeling "suicidal".  Patient was discharged from inpatient psych today.  Patient states she went back to [facility] residence and while she was there he developed anxiety and depression, patient states she has a new boyfriend and was supposed to meet up with him however was unable to and since that time she is feeling increasingly depressed and states she is suicidal.  Denies any hallucinations.  Denies any substance use.  Denies any medical complaints.

## 2023-04-14 NOTE — ED ADULT TRIAGE NOTE - CHIEF COMPLAINT QUOTE
gabi from [facility], dimitry's today from psych, complaining of anxiety, when ems arrived pt was in bathroom, medic heard noise, pt found on floor has a hx of pseudoseizures, unwitnessed fall

## 2023-04-15 DIAGNOSIS — F32.A DEPRESSION, UNSPECIFIED: ICD-10-CM

## 2023-04-15 LAB
GLUCOSE BLDC GLUCOMTR-MCNC: 112 MG/DL — HIGH (ref 70–99)
GLUCOSE BLDC GLUCOMTR-MCNC: 165 MG/DL — HIGH (ref 70–99)
GLUCOSE BLDC GLUCOMTR-MCNC: 179 MG/DL — HIGH (ref 70–99)
GLUCOSE BLDC GLUCOMTR-MCNC: 186 MG/DL — HIGH (ref 70–99)
GLUCOSE BLDC GLUCOMTR-MCNC: 98 MG/DL — SIGNIFICANT CHANGE UP (ref 70–99)
SARS-COV-2 RNA SPEC QL NAA+PROBE: SIGNIFICANT CHANGE UP

## 2023-04-15 PROCEDURE — 99232 SBSQ HOSP IP/OBS MODERATE 35: CPT

## 2023-04-15 PROCEDURE — 82962 GLUCOSE BLOOD TEST: CPT

## 2023-04-15 PROCEDURE — 70450 CT HEAD/BRAIN W/O DYE: CPT

## 2023-04-15 PROCEDURE — 80048 BASIC METABOLIC PNL TOTAL CA: CPT

## 2023-04-15 PROCEDURE — 73502 X-RAY EXAM HIP UNI 2-3 VIEWS: CPT | Mod: LT

## 2023-04-15 PROCEDURE — 93010 ELECTROCARDIOGRAM REPORT: CPT

## 2023-04-15 PROCEDURE — 85025 COMPLETE CBC W/AUTO DIFF WBC: CPT

## 2023-04-15 PROCEDURE — 0225U NFCT DS DNA&RNA 21 SARSCOV2: CPT

## 2023-04-15 PROCEDURE — 81025 URINE PREGNANCY TEST: CPT

## 2023-04-15 PROCEDURE — 36415 COLL VENOUS BLD VENIPUNCTURE: CPT

## 2023-04-15 RX ORDER — DEXTROSE 50 % IN WATER 50 %
12.5 SYRINGE (ML) INTRAVENOUS ONCE
Refills: 0 | Status: DISCONTINUED | OUTPATIENT
Start: 2023-04-15 | End: 2023-04-26

## 2023-04-15 RX ORDER — INSULIN GLARGINE 100 [IU]/ML
30 INJECTION, SOLUTION SUBCUTANEOUS AT BEDTIME
Refills: 0 | Status: DISCONTINUED | OUTPATIENT
Start: 2023-04-15 | End: 2023-04-19

## 2023-04-15 RX ORDER — DEXTROSE 50 % IN WATER 50 %
25 SYRINGE (ML) INTRAVENOUS ONCE
Refills: 0 | Status: DISCONTINUED | OUTPATIENT
Start: 2023-04-15 | End: 2023-04-26

## 2023-04-15 RX ORDER — GLUCAGON INJECTION, SOLUTION 0.5 MG/.1ML
1 INJECTION, SOLUTION SUBCUTANEOUS ONCE
Refills: 0 | Status: DISCONTINUED | OUTPATIENT
Start: 2023-04-15 | End: 2023-04-26

## 2023-04-15 RX ORDER — ALBUTEROL 90 UG/1
1 AEROSOL, METERED ORAL EVERY 6 HOURS
Refills: 0 | Status: DISCONTINUED | OUTPATIENT
Start: 2023-04-15 | End: 2023-04-26

## 2023-04-15 RX ORDER — INSULIN LISPRO 100/ML
8 VIAL (ML) SUBCUTANEOUS
Refills: 0 | Status: DISCONTINUED | OUTPATIENT
Start: 2023-04-15 | End: 2023-04-19

## 2023-04-15 RX ORDER — ESCITALOPRAM OXALATE 10 MG/1
20 TABLET, FILM COATED ORAL DAILY
Refills: 0 | Status: DISCONTINUED | OUTPATIENT
Start: 2023-04-15 | End: 2023-04-26

## 2023-04-15 RX ORDER — SODIUM CHLORIDE 9 MG/ML
1000 INJECTION, SOLUTION INTRAVENOUS
Refills: 0 | Status: DISCONTINUED | OUTPATIENT
Start: 2023-04-15 | End: 2023-04-26

## 2023-04-15 RX ORDER — INSULIN LISPRO 100/ML
VIAL (ML) SUBCUTANEOUS
Refills: 0 | Status: DISCONTINUED | OUTPATIENT
Start: 2023-04-15 | End: 2023-04-26

## 2023-04-15 RX ORDER — DIPHENHYDRAMINE HCL 50 MG
50 CAPSULE ORAL EVERY 6 HOURS
Refills: 0 | Status: DISCONTINUED | OUTPATIENT
Start: 2023-04-15 | End: 2023-04-20

## 2023-04-15 RX ORDER — ESCITALOPRAM OXALATE 10 MG/1
20 TABLET, FILM COATED ORAL ONCE
Refills: 0 | Status: COMPLETED | OUTPATIENT
Start: 2023-04-15 | End: 2023-04-15

## 2023-04-15 RX ORDER — LANOLIN ALCOHOL/MO/W.PET/CERES
3 CREAM (GRAM) TOPICAL AT BEDTIME
Refills: 0 | Status: DISCONTINUED | OUTPATIENT
Start: 2023-04-15 | End: 2023-04-17

## 2023-04-15 RX ORDER — HALOPERIDOL DECANOATE 100 MG/ML
5 INJECTION INTRAMUSCULAR EVERY 6 HOURS
Refills: 0 | Status: DISCONTINUED | OUTPATIENT
Start: 2023-04-15 | End: 2023-04-20

## 2023-04-15 RX ORDER — AMITRIPTYLINE HCL 25 MG
75 TABLET ORAL DAILY
Refills: 0 | Status: DISCONTINUED | OUTPATIENT
Start: 2023-04-15 | End: 2023-04-26

## 2023-04-15 RX ORDER — PANTOPRAZOLE SODIUM 20 MG/1
40 TABLET, DELAYED RELEASE ORAL ONCE
Refills: 0 | Status: COMPLETED | OUTPATIENT
Start: 2023-04-15 | End: 2023-04-15

## 2023-04-15 RX ORDER — DEXTROSE 50 % IN WATER 50 %
15 SYRINGE (ML) INTRAVENOUS ONCE
Refills: 0 | Status: DISCONTINUED | OUTPATIENT
Start: 2023-04-15 | End: 2023-04-26

## 2023-04-15 RX ORDER — ACETAMINOPHEN 500 MG
650 TABLET ORAL EVERY 6 HOURS
Refills: 0 | Status: DISCONTINUED | OUTPATIENT
Start: 2023-04-15 | End: 2023-04-26

## 2023-04-15 RX ORDER — PANTOPRAZOLE SODIUM 20 MG/1
40 TABLET, DELAYED RELEASE ORAL
Refills: 0 | Status: DISCONTINUED | OUTPATIENT
Start: 2023-04-15 | End: 2023-04-26

## 2023-04-15 RX ORDER — METOPROLOL TARTRATE 50 MG
25 TABLET ORAL ONCE
Refills: 0 | Status: COMPLETED | OUTPATIENT
Start: 2023-04-15 | End: 2023-04-15

## 2023-04-15 RX ORDER — METOPROLOL TARTRATE 50 MG
25 TABLET ORAL
Refills: 0 | Status: DISCONTINUED | OUTPATIENT
Start: 2023-04-15 | End: 2023-04-26

## 2023-04-15 RX ORDER — FERROUS SULFATE 325(65) MG
325 TABLET ORAL DAILY
Refills: 0 | Status: DISCONTINUED | OUTPATIENT
Start: 2023-04-15 | End: 2023-04-26

## 2023-04-15 RX ORDER — ONDANSETRON 8 MG/1
4 TABLET, FILM COATED ORAL THREE TIMES A DAY
Refills: 0 | Status: DISCONTINUED | OUTPATIENT
Start: 2023-04-15 | End: 2023-04-26

## 2023-04-15 RX ORDER — HYDROXYZINE HCL 10 MG
50 TABLET ORAL EVERY 6 HOURS
Refills: 0 | Status: DISCONTINUED | OUTPATIENT
Start: 2023-04-15 | End: 2023-04-18

## 2023-04-15 RX ADMIN — PANTOPRAZOLE SODIUM 40 MILLIGRAM(S): 20 TABLET, DELAYED RELEASE ORAL at 12:54

## 2023-04-15 RX ADMIN — Medication 30 MILLILITER(S): at 12:53

## 2023-04-15 RX ADMIN — Medication 8 UNIT(S): at 17:34

## 2023-04-15 RX ADMIN — Medication 25 MILLIGRAM(S): at 11:50

## 2023-04-15 RX ADMIN — Medication 25 MILLIGRAM(S): at 17:33

## 2023-04-15 RX ADMIN — ESCITALOPRAM OXALATE 20 MILLIGRAM(S): 10 TABLET, FILM COATED ORAL at 12:54

## 2023-04-15 RX ADMIN — Medication 50 MILLIGRAM(S): at 17:33

## 2023-04-15 RX ADMIN — Medication 2: at 17:34

## 2023-04-15 RX ADMIN — INSULIN GLARGINE 30 UNIT(S): 100 INJECTION, SOLUTION SUBCUTANEOUS at 20:35

## 2023-04-15 RX ADMIN — Medication 3 MILLIGRAM(S): at 20:30

## 2023-04-15 RX ADMIN — Medication 75 MILLIGRAM(S): at 17:33

## 2023-04-15 NOTE — ED BEHAVIORAL HEALTH ASSESSMENT NOTE - RISK ASSESSMENT
unmodifiable risk factors: hx of admissions, hx of SA, chronic medical concerns  modifiable risk factors: active mood sx, under medicated, ongoing SI w/plan  protective factors: no access to firearms, help seeking behaviors, engaged in treatment, no substance use

## 2023-04-15 NOTE — BH PATIENT PROFILE - NSFLUVACAGEDISCH_IMM_ALL_CORE
Subjective   Patient ID: Sena is a 75 year old female.    Chief Complaint   Patient presents with   • Cough     SOB, chest and rib pain      Cough for 2 weeks  Seen in urgent care around the time of Shalonda for influenza-like symptoms, no prescription given  Has continued to have productive cough, malaise  Feels congestion in her throat  Feels chest / sternal pain from coughing  No respiratory distress  No documented fevers  No known sick contacts  Not sleeping because of her cough      Patient's medications, allergies, past medical, surgical, social and family histories were reviewed and updated as appropriate.    Review of Systems   Constitutional: Negative for fever and unexpected weight change.   Eyes: Negative for redness.   Gastrointestinal: Negative for abdominal pain.   Endocrine: Negative for cold intolerance and heat intolerance.   Genitourinary: Negative for dysuria.   Skin: Negative for rash.   Neurological: Negative for syncope.       Objective   Physical Exam  Vitals signs reviewed.   Constitutional:       Appearance: Normal appearance.   HENT:      Head: Normocephalic and atraumatic.      Nose: Nose normal.      Mouth/Throat:      Mouth: Mucous membranes are moist.   Eyes:      Pupils: Pupils are equal, round, and reactive to light.   Neck:      Musculoskeletal: Neck supple.   Cardiovascular:      Rate and Rhythm: Normal rate and regular rhythm.      Heart sounds: Normal heart sounds.   Pulmonary:      Effort: Pulmonary effort is normal. No respiratory distress.      Comments: +cough,  Coarse breath sounds clear with cough  Abdominal:      Palpations: Abdomen is soft.   Lymphadenopathy:      Cervical: No cervical adenopathy.   Skin:     General: Skin is warm and dry.   Neurological:      Mental Status: She is alert. Mental status is at baseline.   Psychiatric:         Mood and Affect: Mood normal.         Patient Active Problem List   Diagnosis   • Vertigo   • Thigh pain, musculoskeletal   •  Rheumatoid arthritis (CMS/HCC)   • Pain, upper back   • Myalgia   • Muscle spasm of back   • Left-sided low back pain with left-sided sciatica   • Essential hypertension   • Hypothyroidism   • Hyperlipidemia   • Degeneration, intervertebral disc, lumbar   • Visit for screening mammogram   • Pain of left calf   • Major depressive disorder, recurrent episode, mild (CMS/HCC)     Current Medications    ACETAMINOPHEN ER PO        ASPIRIN 81 MG EC TABLET    Take 1 tablet by mouth daily.    ATORVASTATIN (LIPITOR) 20 MG TABLET    Take 1 tablet by mouth daily.    CALCIUM 600-D 600-400 MG-UNIT TAB    TOME ANJEL TABLETA TODOS LOS SOLIMAN    DORZOLAMIDE-TIMOLOL (COSOPT) 22.3-6.8 MG/ML OPHTHALMIC SOLUTION    PLACE 1 DROP INTO BOTH EYES 2 TIMES A DAY    FOLIC ACID (FOLATE) 1 MG TABLET    Take 1 tablet by mouth daily.    LATANOPROST (XALATAN) 0.005 % OPHTHALMIC SOLUTION    PLACE 1 DROP INTO BOTH EYES AT BEDTIME    LEVOTHYROXINE (SYNTHROID, LEVOTHROID) 100 MCG TABLET    Take 1 tablet by mouth daily.    LOTEMAX 0.5 % OPHTHALMIC GEL    PLACE 1 DROP INTO BOTH EYES 4 TIMES PER DAY    METHOTREXATE (RHEUMATREX) 2.5 MG TABLET    TOME CUATRO TABLETAS POR VIA ORAL ANJEL VEZ A LA SEMANA    METOPROLOL SUCCINATE (TOPROL-XL) 50 MG 24 HR TABLET    Take 1 tablet by mouth daily.    OMEPRAZOLE 20 MG TABLET    Take 1 tablet by mouth daily.    TRAMADOL (ULTRAM) 50 MG TABLET    Take 0.5 tablets by mouth 3 times daily as needed for Pain.       Assessment   Problem List Items Addressed This Visit     None      Visit Diagnoses     Community acquired pneumonia, unspecified laterality    -  Primary    Relevant Medications    azithromycin (ZITHROMAX) 250 MG tablet    fluticasone (FLONASE) 50 MCG/ACT nasal spray    benzonatate (TESSALON PERLES) 100 MG capsule      will treat as CAP given duration of symptoms and exam  Start antibiotics today  Tessalon pearles for relief of cough  Stay well hydrated  Use home remedies: honey, ginger, tea, etc as helpful  Rest as  needed  Will also send Flonase to help with postnasal drip    Blood pressure high - has been downtrending at home and patient complying with prescribed medications. Keep log and reeval in about 3 weeks.    Return in about 3 weeks (around 1/31/2020) for BP check.    Adult

## 2023-04-15 NOTE — ED BEHAVIORAL HEALTH ASSESSMENT NOTE - DETAILS
+barbi plan for collateral from inpatient galactorrhea w/zyprexa see HPI Hx of multiple recent familial deaths that have been very distressing to the pt. discharged earlier today from inpatient discussed with ED off hours - will call for collateral in the AM

## 2023-04-15 NOTE — ED BEHAVIORAL HEALTH ASSESSMENT NOTE - SUMMARY
37F who is domiciled at [facility] with mother, unemployed, single, with PMHx of T1DM, Diabetic neuropathy, HTN, chronic back pain, migraine headaches, PPHx of anxiety/depression, 5 or 6 prior lifetime psychiatric admissions for suicide attempts  (once by taking a capful of acetone, another by drinking hydrogen peroxide, another attempt by taking half a bottle of ibuprofen), and discharged earlier today form inpatient admission presenting for suicidal ideation, intent and plan. Patient does not feel safe to return home at this time. Will need to obtain collateral from inpatient attending.

## 2023-04-15 NOTE — ED BEHAVIORAL HEALTH ASSESSMENT NOTE - CURRENT MEDICATION
Per DC summary, ferrous 325mg daily, protonix 40mg daily, amitriptyline 75mg QHS, lexapro 20mg daily, zofran 4mg TID PRN, hydroxyzine 50mg q6hrs PRN spells, metoprolol 25mg BID, albuterol inhaler, melatonin 3mg QHS

## 2023-04-15 NOTE — ED BEHAVIORAL HEALTH ASSESSMENT NOTE - DESCRIPTION
PMHx of T1DM, Diabetic neuropathy , HTN, chronic back pain, migraine headaches see hpi Per BTCM note

## 2023-04-15 NOTE — ED BEHAVIORAL HEALTH NOTE - BEHAVIORAL HEALTH NOTE
per Dr. Stevenson assessment : "37F who is domiciled at [facility] with mother, unemployed, single, with PMHx of T1DM, Diabetic neuropathy, HTN, chronic back pain, migraine headaches, PPHx of anxiety/depression, 5 or 6 prior lifetime psychiatric admissions for suicide attempts  (once by taking a capful of acetone, another by drinking hydrogen peroxide, another attempt by taking half a bottle of ibuprofen), and discharged earlier today form inpatient admission presenting for suicidal ideation, intent and plan. Patient does not feel safe to return home at this time. Will need to obtain collateral from inpatient attending."    Client reassessed 2pm, continues to endorse feelings of unsafe return to home, refuses to contract to safety, requesting admission to hospital, voicing vague SI, intent, no clear plan at this time.  Discussed with inpatient attending   requested voluntary admission.

## 2023-04-15 NOTE — BH PATIENT PROFILE - FALL HARM RISK - HARM RISK INTERVENTIONS

## 2023-04-15 NOTE — ED BEHAVIORAL HEALTH NOTE - BEHAVIORAL HEALTH NOTE
==================  PRE-HOSPITAL COURSE  ===================  SOURCE:  RN and secondhand ED documentation  DETAILS: 37-year-old female past medical history of anxiety, depression, diabetes, pseudoseizures presenting to the ED for evaluation of anxiety and depression, states she is feeling "suicidal".  =========  ED COURSE  =========  SOURCE:  KALLIE Gold and secondhand ED documentation.  ARRIVAL:  Per chart and RN, patient arrived via EMS. Per RN, patient was calm upon arrival, and cooperative with triage process.  BELONGINGS:  Per RN, patient arrived with belongings. All belongings were provided to hospital security, and patient currently in a gown with a 1:1 staff member.  BEHAVIOR: RN described patient to be calm and cooperative, presenting with linear thought process, AAOx3, presenting with normal mood and appropriate affect, remains in behavioral and impulse control, is not violent/aggressive. RN stated that the patient is endorsing SI. RN stated that there are no visible marks, bruises, or lacerations on the body. RN stated that the patient appears to be well groomed, maintains good hygiene, and reports good ADLs.  TREATMENT:  Per chart and RN, patient did not receive PRN medications.   VISITORS:  Per RN, mother at bedside.         COVID Exposure Screen- collateral (i.e. third-party, chart review, belongings, etc; include EMS and ED staff)   ---------------------------------------------------  1. Has the patient had a COVID-19 test in the last 90 days? Unknown.   2. Has the patient tested positive for COVID-19 antibodies? Unknown.   3. Has the patient received 2 doses of the COVID-19 vaccine? Unknown  4. In the past 10 days, has the patient been around anyone with a positive COVID-19 test?* Unknown.   5. Has the patient been out of [place] within the past 10 days? Unknown

## 2023-04-15 NOTE — BH PATIENT PROFILE - HOME MEDICATIONS
pantoprazole 40 mg oral delayed release tablet , 1 tab(s) orally once a day (before a meal)  melatonin 3 mg oral tablet , 1 tab(s) orally once a day (at bedtime)  ferrous sulfate 325 mg (65 mg elemental iron) oral tablet , 1 tab(s) orally once a day  albuterol 90 mcg/inh inhalation powder , 1 puff(s) inhaled every 6 hours as needed for  bronchospasm  metoprolol tartrate 25 mg oral tablet , 1 tab(s) orally 2 times a day  hydrOXYzine hydrochloride 50 mg oral tablet , 1 tab(s) orally every 6 hours as needed for anxiety/psychogenic seizures  ondansetron 4 mg oral tablet, disintegrating , 1 tab(s) orally 3 times a day as needed for  nausea  Lexapro 20 mg oral tablet , 1 tab(s) orally once a day  amitriptyline 75 mg oral tablet , 1 tab(s) orally once a day (at bedtime)  aluminum hydroxide-magnesium hydroxide 200 mg-200 mg/5 mL oral suspension , 30 milliliter(s) orally every 6 hours as needed for Dyspepsia  acetaminophen 500 mg oral tablet , 2 tab(s) orally every 8 hours

## 2023-04-16 ENCOUNTER — RX RENEWAL (OUTPATIENT)
Age: 38
End: 2023-04-16

## 2023-04-16 LAB
GLUCOSE BLDC GLUCOMTR-MCNC: 185 MG/DL — HIGH (ref 70–99)
GLUCOSE BLDC GLUCOMTR-MCNC: 196 MG/DL — HIGH (ref 70–99)
GLUCOSE BLDC GLUCOMTR-MCNC: 261 MG/DL — HIGH (ref 70–99)
GLUCOSE BLDC GLUCOMTR-MCNC: 352 MG/DL — HIGH (ref 70–99)

## 2023-04-16 PROCEDURE — 99221 1ST HOSP IP/OBS SF/LOW 40: CPT

## 2023-04-16 RX ORDER — ACETAMINOPHEN 500 MG
650 TABLET ORAL EVERY 6 HOURS
Refills: 0 | Status: DISCONTINUED | OUTPATIENT
Start: 2023-04-16 | End: 2023-04-26

## 2023-04-16 RX ADMIN — Medication 3 MILLIGRAM(S): at 20:27

## 2023-04-16 RX ADMIN — Medication 8 UNIT(S): at 07:19

## 2023-04-16 RX ADMIN — Medication 50 MILLIGRAM(S): at 06:25

## 2023-04-16 RX ADMIN — Medication 50 MILLIGRAM(S): at 17:27

## 2023-04-16 RX ADMIN — Medication 25 MILLIGRAM(S): at 17:27

## 2023-04-16 RX ADMIN — Medication 75 MILLIGRAM(S): at 11:09

## 2023-04-16 RX ADMIN — Medication 8 UNIT(S): at 16:19

## 2023-04-16 RX ADMIN — INSULIN GLARGINE 30 UNIT(S): 100 INJECTION, SOLUTION SUBCUTANEOUS at 20:29

## 2023-04-16 RX ADMIN — Medication 10: at 12:05

## 2023-04-16 RX ADMIN — Medication 50 MILLIGRAM(S): at 12:04

## 2023-04-16 RX ADMIN — PANTOPRAZOLE SODIUM 40 MILLIGRAM(S): 20 TABLET, DELAYED RELEASE ORAL at 06:25

## 2023-04-16 RX ADMIN — Medication 325 MILLIGRAM(S): at 11:09

## 2023-04-16 RX ADMIN — Medication 25 MILLIGRAM(S): at 06:25

## 2023-04-16 RX ADMIN — Medication 2: at 16:18

## 2023-04-16 RX ADMIN — Medication 6: at 07:18

## 2023-04-16 RX ADMIN — ESCITALOPRAM OXALATE 20 MILLIGRAM(S): 10 TABLET, FILM COATED ORAL at 11:09

## 2023-04-16 RX ADMIN — Medication 8 UNIT(S): at 12:05

## 2023-04-16 NOTE — BH SOCIAL WORK INITIAL PSYCHOSOCIAL EVALUATION - OTHER PAST PSYCHIATRIC HISTORY (INCLUDE DETAILS REGARDING ONSET, COURSE OF ILLNESS, INPATIENT/OUTPATIENT TREATMENT)
Anxiety, Depression.  Prior inpatient psychiatry admissions following suicide attempts.  Patient was discharged from [facility] IPP and returned endorsing suicidal ideation, intent and plan.

## 2023-04-16 NOTE — CONSULT NOTE ADULT - ASSESSMENT
37F who is domiciled at [facility] with mother, unemployed, single, with PMHx of T1DM, Diabetic neuropathy, HTN, chronic back pain, migraine headaches, PPHx of anxiety/depression, 5 or 6 prior lifetime psychiatric admissions for suicide attempts  (once by taking a capful of acetone, another by drinking hydrogen peroxide, another attempt by taking half a bottle of ibuprofen), and discharged earlier today form inpatient admission presenting for suicidal ideation, intent and plan. Patient does not feel safe to return home at this time.     Type I DM complicated by Neuropathy   -Home Insulin Pump Discontinued   -Continue insulin short and long acting insulin in house  -Finger-stick is uncontrolled due to unlimited access to sugary snacks   -will follow for continued management   -Patient denies H/O HTN : BP: 96/51  -c/w Metoprolol     DVT PPX: Ambulation Encouraged   GI PPX: PPI

## 2023-04-16 NOTE — CONSULT NOTE ADULT - SUBJECTIVE AND OBJECTIVE BOX
Patient is a 37y old  Female who presents with a chief complaint of Suicidal Ideation and depression       MEDICATIONS  (STANDING):  amitriptyline 75 milliGRAM(s) Oral daily  dextrose 5%. 1000 milliLiter(s) (50 mL/Hr) IV Continuous <Continuous>  dextrose 5%. 1000 milliLiter(s) (100 mL/Hr) IV Continuous <Continuous>  dextrose 50% Injectable 25 Gram(s) IV Push once  dextrose 50% Injectable 25 Gram(s) IV Push once  dextrose 50% Injectable 12.5 Gram(s) IV Push once  escitalopram 20 milliGRAM(s) Oral daily  ferrous    sulfate 325 milliGRAM(s) Oral daily  glucagon  Injectable 1 milliGRAM(s) IntraMuscular once  hydrOXYzine hydrochloride 50 milliGRAM(s) Oral every 6 hours  insulin glargine Injectable (LANTUS) 30 Unit(s) SubCutaneous at bedtime  insulin lispro (ADMELOG) corrective regimen sliding scale   SubCutaneous three times a day before meals  insulin lispro Injectable (ADMELOG) 8 Unit(s) SubCutaneous three times a day before meals  melatonin 3 milliGRAM(s) Oral at bedtime  metoprolol tartrate 25 milliGRAM(s) Oral two times a day  pantoprazole    Tablet 40 milliGRAM(s) Oral before breakfast    MEDICATIONS  (PRN):  acetaminophen     Tablet .. 650 milliGRAM(s) Oral every 6 hours PRN Moderate Pain (4 - 6)  acetaminophen     Tablet .. 650 milliGRAM(s) Oral every 6 hours PRN Temp greater or equal to 38C (100.4F), Mild Pain (1 - 3)  albuterol    90 MICROgram(s) HFA Inhaler 1 Puff(s) Inhalation every 6 hours PRN Bronchospasm  aluminum hydroxide/magnesium hydroxide/simethicone Suspension 30 milliLiter(s) Oral every 6 hours PRN Dyspepsia  dextrose Oral Gel 15 Gram(s) Oral once PRN Blood Glucose LESS THAN 70 milliGRAM(s)/deciliter  diphenhydrAMINE Injectable 50 milliGRAM(s) IntraMuscular every 6 hours PRN Agitation  haloperidol    Injectable 5 milliGRAM(s) IntraMuscular every 6 hours PRN Agitation  LORazepam   Injectable 2 milliGRAM(s) IntraMuscular every 6 hours PRN Agitation  ondansetron   Disintegrating Tablet 4 milliGRAM(s) Oral three times a day PRN for nausea      CAPILLARY BLOOD GLUCOSE      POCT Blood Glucose.: 352 mg/dL (16 Apr 2023 12:03)  POCT Blood Glucose.: 261 mg/dL (16 Apr 2023 06:56)  POCT Blood Glucose.: 179 mg/dL (15 Apr 2023 20:17)  POCT Blood Glucose.: 186 mg/dL (15 Apr 2023 17:19)    I&O's Summary      PHYSICAL EXAM:  Vital Signs Last 24 Hrs  T(C): 36.4 (16 Apr 2023 08:38), Max: 36.8 (15 Apr 2023 15:30)  T(F): 97.6 (16 Apr 2023 08:38), Max: 98.2 (15 Apr 2023 15:30)  HR: 101 (16 Apr 2023 08:38) (90 - 101)  BP: 96/51 (16 Apr 2023 08:38) (96/51 - 160/78)  BP(mean): --  RR: 18 (16 Apr 2023 08:38) (18 - 18)  SpO2: 99% (15 Apr 2023 15:30) (99% - 99%)    Parameters below as of 15 Apr 2023 15:30  Patient On (Oxygen Delivery Method): room air        GENERAL: No acute distress, well-developed, appears sad   HEAD:  Atraumatic, Normocephalic  EYES: EOMI, PERRLA, conjunctiva and sclera clear  NECK: Supple, no lymphadenopathy, no JVD  CHEST/LUNG: CTAB; No wheezes, rales, or rhonchi  HEART: Regular rate and rhythm; No murmurs, rubs, or gallops  ABDOMEN: Soft, non-tender, non-distended; normal bowel sounds, no organomegaly  EXTREMITIES:  2+ peripheral pulses b/l, No clubbing, cyanosis, or edema  NEUROLOGY: A&O x 3, no focal deficits  SKIN: No rashes or lesions    LABS:                        13.2   11.62 )-----------( 417      ( 14 Apr 2023 22:00 )             41.4     04-14    135  |  99  |  11  ----------------------------<  213<H>  4.6   |  27  |  0.7    Ca    9.6      14 Apr 2023 22:00      Urinalysis Basic - ( 14 Apr 2023 21:43 )    Color: Yellow / Appearance: Clear / SG: >=1.030 / pH: x  Gluc: x / Ketone: Trace  / Bili: Negative / Urobili: 0.2 mg/dL   Blood: x / Protein: 30 mg/dL / Nitrite: Negative   Leuk Esterase: Negative / RBC: 1-2 /HPF / WBC Negative   Sq Epi: x / Non Sq Epi: x / Bacteria: x

## 2023-04-17 ENCOUNTER — NON-APPOINTMENT (OUTPATIENT)
Age: 38
End: 2023-04-17

## 2023-04-17 DIAGNOSIS — F60.9 PERSONALITY DISORDER, UNSPECIFIED: ICD-10-CM

## 2023-04-17 DIAGNOSIS — Z48.1 ENCOUNTER FOR PLANNED POSTPROCEDURAL WOUND CLOSURE: ICD-10-CM

## 2023-04-17 LAB
GLUCOSE BLDC GLUCOMTR-MCNC: 299 MG/DL — HIGH (ref 70–99)
GLUCOSE BLDC GLUCOMTR-MCNC: 305 MG/DL — HIGH (ref 70–99)
GLUCOSE BLDC GLUCOMTR-MCNC: 318 MG/DL — HIGH (ref 70–99)
GLUCOSE BLDC GLUCOMTR-MCNC: 77 MG/DL — SIGNIFICANT CHANGE UP (ref 70–99)

## 2023-04-17 PROCEDURE — 99231 SBSQ HOSP IP/OBS SF/LOW 25: CPT

## 2023-04-17 RX ORDER — INSULIN LISPRO 100/ML
6 VIAL (ML) SUBCUTANEOUS ONCE
Refills: 0 | Status: COMPLETED | OUTPATIENT
Start: 2023-04-17 | End: 2023-04-17

## 2023-04-17 RX ORDER — PSEUDOEPHEDRINE HCL 30 MG
30 TABLET ORAL ONCE
Refills: 0 | Status: COMPLETED | OUTPATIENT
Start: 2023-04-17 | End: 2023-04-17

## 2023-04-17 RX ADMIN — Medication 25 MILLIGRAM(S): at 06:31

## 2023-04-17 RX ADMIN — Medication 25 MILLIGRAM(S): at 19:22

## 2023-04-17 RX ADMIN — Medication 50 MILLIGRAM(S): at 06:23

## 2023-04-17 RX ADMIN — Medication 8 UNIT(S): at 11:34

## 2023-04-17 RX ADMIN — Medication 8 UNIT(S): at 07:26

## 2023-04-17 RX ADMIN — Medication 650 MILLIGRAM(S): at 21:00

## 2023-04-17 RX ADMIN — Medication 50 MILLIGRAM(S): at 19:22

## 2023-04-17 RX ADMIN — Medication 650 MILLIGRAM(S): at 22:00

## 2023-04-17 RX ADMIN — Medication 75 MILLIGRAM(S): at 11:26

## 2023-04-17 RX ADMIN — Medication 6 UNIT(S): at 20:56

## 2023-04-17 RX ADMIN — Medication 8: at 11:34

## 2023-04-17 RX ADMIN — ESCITALOPRAM OXALATE 20 MILLIGRAM(S): 10 TABLET, FILM COATED ORAL at 11:26

## 2023-04-17 RX ADMIN — INSULIN GLARGINE 30 UNIT(S): 100 INJECTION, SOLUTION SUBCUTANEOUS at 20:16

## 2023-04-17 RX ADMIN — PANTOPRAZOLE SODIUM 40 MILLIGRAM(S): 20 TABLET, DELAYED RELEASE ORAL at 06:23

## 2023-04-17 RX ADMIN — Medication 50 MILLIGRAM(S): at 11:26

## 2023-04-17 RX ADMIN — Medication 325 MILLIGRAM(S): at 11:26

## 2023-04-17 RX ADMIN — Medication 30 MILLIGRAM(S): at 22:04

## 2023-04-17 RX ADMIN — Medication 8: at 07:27

## 2023-04-17 NOTE — BH INPATIENT PSYCHIATRY ASSESSMENT NOTE - NSBHCHARTREVIEWVS_PSY_A_CORE FT
Vital Signs Last 24 Hrs  T(C): 36.1 (04-17-23 @ 08:20), Max: 36.1 (04-17-23 @ 08:20)  T(F): 96.9 (04-17-23 @ 08:20), Max: 96.9 (04-17-23 @ 08:20)  HR: 90 (04-17-23 @ 08:20) (90 - 105)  BP: 118/71 (04-17-23 @ 08:20) (107/55 - 138/71)  BP(mean): --  RR: 18 (04-17-23 @ 08:20) (18 - 18)  SpO2: --
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
## 2023-04-17 NOTE — BH INPATIENT PSYCHIATRY ASSESSMENT NOTE - NSBHASSESSSUMMFT_PSY_ALL_CORE
37F who is domiciled at [facility] with mother, unemployed, single, with PMHx of T1DM, Diabetic neuropathy, HTN, chronic back pain, migraine headaches, PPHx of anxiety/depression, 5 or 6 prior lifetime psychiatric admissions for suicide attempts  (once by taking a capful of acetone, another by drinking hydrogen peroxide, another attempt by taking half a bottle of ibuprofen), recently discharged from [facility], presented for vague suicidal thoughts after romantic rejection.  04/17/23 Patient somewhat subdued but future oriented, eager to learn skills to help with feelings of rejection.     Impression: Cluster B traits vs Anxiety vs Depression    #ARMAAN #Cluster B Traits #Depression  - Continue Lexapro 20 mg  - Consider augmentation with abilify  - Treatment mainly centered on creating a validating environment for patient, and increasing insight into the nature of her anxieties and interpersonal difficulties  - DBT worksheets    #Agitation  -For episodes of acute agitation can offer PO Haldol 5 mg/Ativan 2 mg/Benadryl 50 mg Q6H PRN. If refusing PO and is in acute risk of harm to self/other, can offer IM Haldol 5 mg/Ativan 2 mg/Benadryl 50 mg Q6H PRN. If given, please repeat EKG and hold antipsychotics if QTC>500     #Diabetes   - DC insulin pump while on inpatient unit  - Lantus 30 at bedtime  - Lispro 8 TID  - Sliding scale insulin    #HTN  - Metoprolol 25 mg BID    #Anemia  - Ferrous sulfate    #Nausea  - PRN Odansetron

## 2023-04-17 NOTE — BH INPATIENT PSYCHIATRY ASSESSMENT NOTE - CURRENT MEDICATION
MEDICATIONS  (STANDING):  amitriptyline 75 milliGRAM(s) Oral daily  dextrose 5%. 1000 milliLiter(s) (50 mL/Hr) IV Continuous <Continuous>  dextrose 5%. 1000 milliLiter(s) (100 mL/Hr) IV Continuous <Continuous>  dextrose 50% Injectable 25 Gram(s) IV Push once  dextrose 50% Injectable 12.5 Gram(s) IV Push once  dextrose 50% Injectable 25 Gram(s) IV Push once  escitalopram 20 milliGRAM(s) Oral daily  ferrous    sulfate 325 milliGRAM(s) Oral daily  glucagon  Injectable 1 milliGRAM(s) IntraMuscular once  hydrOXYzine hydrochloride 50 milliGRAM(s) Oral every 6 hours  insulin glargine Injectable (LANTUS) 30 Unit(s) SubCutaneous at bedtime  insulin lispro (ADMELOG) corrective regimen sliding scale   SubCutaneous three times a day before meals  insulin lispro Injectable (ADMELOG) 8 Unit(s) SubCutaneous three times a day before meals  melatonin 3 milliGRAM(s) Oral at bedtime  metoprolol tartrate 25 milliGRAM(s) Oral two times a day  pantoprazole    Tablet 40 milliGRAM(s) Oral before breakfast    MEDICATIONS  (PRN):  acetaminophen     Tablet .. 650 milliGRAM(s) Oral every 6 hours PRN Moderate Pain (4 - 6)  acetaminophen     Tablet .. 650 milliGRAM(s) Oral every 6 hours PRN Temp greater or equal to 38C (100.4F), Mild Pain (1 - 3)  albuterol    90 MICROgram(s) HFA Inhaler 1 Puff(s) Inhalation every 6 hours PRN Bronchospasm  aluminum hydroxide/magnesium hydroxide/simethicone Suspension 30 milliLiter(s) Oral every 6 hours PRN Dyspepsia  dextrose Oral Gel 15 Gram(s) Oral once PRN Blood Glucose LESS THAN 70 milliGRAM(s)/deciliter  diphenhydrAMINE Injectable 50 milliGRAM(s) IntraMuscular every 6 hours PRN Agitation  haloperidol    Injectable 5 milliGRAM(s) IntraMuscular every 6 hours PRN Agitation  LORazepam   Injectable 2 milliGRAM(s) IntraMuscular every 6 hours PRN Agitation  ondansetron   Disintegrating Tablet 4 milliGRAM(s) Oral three times a day PRN for nausea   MEDICATIONS  (STANDING):  amitriptyline 75 milliGRAM(s) Oral daily  dextrose 5%. 1000 milliLiter(s) (50 mL/Hr) IV Continuous <Continuous>  dextrose 5%. 1000 milliLiter(s) (100 mL/Hr) IV Continuous <Continuous>  dextrose 50% Injectable 12.5 Gram(s) IV Push once  dextrose 50% Injectable 25 Gram(s) IV Push once  dextrose 50% Injectable 25 Gram(s) IV Push once  escitalopram 20 milliGRAM(s) Oral daily  ferrous    sulfate 325 milliGRAM(s) Oral daily  glucagon  Injectable 1 milliGRAM(s) IntraMuscular once  hydrOXYzine hydrochloride 50 milliGRAM(s) Oral every 6 hours  insulin glargine Injectable (LANTUS) 30 Unit(s) SubCutaneous at bedtime  insulin lispro (ADMELOG) corrective regimen sliding scale   SubCutaneous three times a day before meals  insulin lispro Injectable (ADMELOG) 8 Unit(s) SubCutaneous three times a day before meals  melatonin 3 milliGRAM(s) Oral at bedtime  metoprolol tartrate 25 milliGRAM(s) Oral two times a day  pantoprazole    Tablet 40 milliGRAM(s) Oral before breakfast    MEDICATIONS  (PRN):  acetaminophen     Tablet .. 650 milliGRAM(s) Oral every 6 hours PRN Moderate Pain (4 - 6)  acetaminophen     Tablet .. 650 milliGRAM(s) Oral every 6 hours PRN Temp greater or equal to 38C (100.4F), Mild Pain (1 - 3)  albuterol    90 MICROgram(s) HFA Inhaler 1 Puff(s) Inhalation every 6 hours PRN Bronchospasm  aluminum hydroxide/magnesium hydroxide/simethicone Suspension 30 milliLiter(s) Oral every 6 hours PRN Dyspepsia  dextrose Oral Gel 15 Gram(s) Oral once PRN Blood Glucose LESS THAN 70 milliGRAM(s)/deciliter  diphenhydrAMINE Injectable 50 milliGRAM(s) IntraMuscular every 6 hours PRN Agitation  haloperidol    Injectable 5 milliGRAM(s) IntraMuscular every 6 hours PRN Agitation  LORazepam   Injectable 2 milliGRAM(s) IntraMuscular every 6 hours PRN Agitation  ondansetron   Disintegrating Tablet 4 milliGRAM(s) Oral three times a day PRN for nausea

## 2023-04-17 NOTE — BH INPATIENT PSYCHIATRY ASSESSMENT NOTE - NSBHMETABOLIC_PSY_ALL_CORE_FT
BMI: BMI (kg/m2): 31.4 (04-15-23 @ 16:42)  HbA1c: A1C with Estimated Average Glucose Result: 9.2 % (04-07-23 @ 08:11)    Glucose: POCT Blood Glucose.: 305 mg/dL (04-17-23 @ 11:01)    BP: 118/71 (04-17-23 @ 08:20) (96/51 - 160/78)  Lipid Panel:

## 2023-04-17 NOTE — BH INPATIENT PSYCHIATRY PROGRESS NOTE - NSBHASSESSSUMMFT_PSY_ALL_CORE
37F who is domiciled at [facility] with mother, unemployed, single, with PMHx of T1DM, Diabetic neuropathy, HTN, chronic back pain, migraine headaches, PPHx of anxiety/depression, 5 or 6 prior lifetime psychiatric admissions for suicide attempts  (once by taking a capful of acetone, another by drinking hydrogen peroxide, another attempt by taking half a bottle of ibuprofen), recenntly discharged from [facility], presented for vague suicidal thoughts after romantic rejection  Impression: Cluster B traits vs Anxiety vs Depression  -

## 2023-04-17 NOTE — ED ADULT NURSE NOTE - ISOLATION TYPE:
-- DO NOT REPLY / DO NOT REPLY ALL --  -- Message is from [facility]agement Center Operations (ECO) --    ONLY TO BE USED WITHIN A REFILL MEDICATION ENCOUNTER    Med Refill  Is the patient currently having any symptoms?: No/Non-Emergent symptoms    Name of medication requested: See pended med    Has patient contacted the pharmacy? Yes    Is this the first request for the medication in the last 48 hours?: Yes   Please send per caller a text when this has been addressed, thanks.      Patient is requesting a medication refill - medication is on active list      Full name of the provider who ordered the medication: Dr Gonzalez    [facility] site name / Account # for provider: Yaw    Preferred Pharmacy: Pharmacy  [facility] - S[facility] Collier - [facility] To Registered [facility] Sites    Patient confirmed the above pharmacy as correct?  Yes      Caller Information       Type Contact Phone/Fax    04/17/2023 01:54 PM CDT Phone (Incoming) Tiffany Gilberto (Self) [phone] (M)          Alternative phone number: NA    Can a detailed message be left?: Yes    Patient is completely out of medication: Verify if patient is currently experiencing symptoms. If patient is symptomatic, proceed with front end triage instead of medication refill. If patient is not symptomatic but is completely out of medication, tushar as High priority when routing. Inform patient: “Please call back with any questions or concerns and if your condition becomes life threatening, you should seek immediate medical assistance by calling 911 or going to the Emergency Department for evaluation.”    Inform all patients: \"If the clinical team needs to contact you regarding this refill, please be aware the return phone call may come from an unidentified or out of state phone number and your refill request will be addressed as soon as the clinical team reviews your message.\"   None

## 2023-04-17 NOTE — UM REPORT PROGRESS NOTE - NSUMRMPROVIDER_GEN_A_CORE FT
Patient: Ania Mei   : 1985   INSURANCE: [email or web address]   ID# [id number]   [phone]     Spoke to Mary Kay, Auth # [id number]. Clinicals have been faxed to [phone]. Awaiting call back from assigned CM.      approved W LCD and review  w Leeanne    Patient: Ania Mei   : 1985   INSURANCE: [employer]   ID# [id number]   [phone]     Spoke to Mary Kay, Auth # [id number]. Clinicals have been faxed to [phone]. Awaiting call back from assigned CM.      approved W LCD and review  w Leeanne    clinical emailed to Leeanne awaiting determination  Patient: Ania Mei   : 1985   INSURANCE: [employer]   ID# [id number]   [phone]     Spoke to Mary Kay, Auth # [id number]. Clinicals have been faxed to [phone]. Awaiting call back from assigned CM.      [facility]    clinical emailed to [facility] awaiting determination    approved LDC and review  w [facility]  Patient: Ania Mei   : 1985   INSURANCE: [employer]   ID# [id number]   [phone]     Spoke to Mary Kay, Auth # [id number]. Clinicals have been faxed to [phone]. Awaiting call back from assigned CM.      approved W LCD and review  w [facility]    clinical emailed to [facility] awaiting determination    approved LDC and review  w [facility]   LDC  w DC

## 2023-04-17 NOTE — BH INPATIENT PSYCHIATRY ASSESSMENT NOTE - NSCOMMENTSUICRISKFACT_PSY_ALL_CORE
Patient's chronic risk factors include her past suicide attempts, her past inpatient hospitalization, her Cluster B traits, and her past diagnosis of anxiety and depression. Her modifiable risk factors include her recent inpatient hospitalization and a recent romantic rejection.

## 2023-04-17 NOTE — BH INPATIENT PSYCHIATRY ASSESSMENT NOTE - RISK ASSESSMENT
Patient's chronic risk factors for suicide include her past suicide attempts, her past inpatient hospitalization, her Cluster B traits, and her past diagnosis of anxiety and depression. Her modifiable risk factors include her recent inpatient hospitalization and a recent romantic rejection. Protective factors include feelings of responsibility to her mother, being future oriented, and being treatment seeking.

## 2023-04-17 NOTE — BH INPATIENT PSYCHIATRY ASSESSMENT NOTE - NSCOMMENTSUICPROTRISKFACT_PSY_ALL_CORE
Protective factors include feelings of responsibility to her mother, being future oriented, and being treatment seeking.

## 2023-04-18 DIAGNOSIS — M48.04 SPINAL STENOSIS, THORACIC REGION: ICD-10-CM

## 2023-04-18 DIAGNOSIS — Z88.0 ALLERGY STATUS TO PENICILLIN: ICD-10-CM

## 2023-04-18 DIAGNOSIS — D64.9 ANEMIA, UNSPECIFIED: ICD-10-CM

## 2023-04-18 DIAGNOSIS — K29.70 GASTRITIS, UNSPECIFIED, WITHOUT BLEEDING: ICD-10-CM

## 2023-04-18 DIAGNOSIS — M51.34 OTHER INTERVERTEBRAL DISC DEGENERATION, THORACIC REGION: ICD-10-CM

## 2023-04-18 DIAGNOSIS — M54.50 LOW BACK PAIN, UNSPECIFIED: ICD-10-CM

## 2023-04-18 DIAGNOSIS — R45.851 SUICIDAL IDEATIONS: ICD-10-CM

## 2023-04-18 DIAGNOSIS — E10.65 TYPE 1 DIABETES MELLITUS WITH HYPERGLYCEMIA: ICD-10-CM

## 2023-04-18 DIAGNOSIS — E10.40 TYPE 1 DIABETES MELLITUS WITH DIABETIC NEUROPATHY, UNSPECIFIED: ICD-10-CM

## 2023-04-18 DIAGNOSIS — G40.909 EPILEPSY, UNSPECIFIED, NOT INTRACTABLE, WITHOUT STATUS EPILEPTICUS: ICD-10-CM

## 2023-04-18 DIAGNOSIS — F31.9 BIPOLAR DISORDER, UNSPECIFIED: ICD-10-CM

## 2023-04-18 DIAGNOSIS — G43.909 MIGRAINE, UNSPECIFIED, NOT INTRACTABLE, WITHOUT STATUS MIGRAINOSUS: ICD-10-CM

## 2023-04-18 DIAGNOSIS — R45.1 RESTLESSNESS AND AGITATION: ICD-10-CM

## 2023-04-18 DIAGNOSIS — R00.0 TACHYCARDIA, UNSPECIFIED: ICD-10-CM

## 2023-04-18 DIAGNOSIS — Z88.1 ALLERGY STATUS TO OTHER ANTIBIOTIC AGENTS STATUS: ICD-10-CM

## 2023-04-18 DIAGNOSIS — F32.5 MAJOR DEPRESSIVE DISORDER, SINGLE EPISODE, IN FULL REMISSION: ICD-10-CM

## 2023-04-18 DIAGNOSIS — R45.87 IMPULSIVENESS: ICD-10-CM

## 2023-04-18 DIAGNOSIS — B37.31 ACUTE CANDIDIASIS OF VULVA AND VAGINA: ICD-10-CM

## 2023-04-18 DIAGNOSIS — F41.1 GENERALIZED ANXIETY DISORDER: ICD-10-CM

## 2023-04-18 DIAGNOSIS — F60.89 OTHER SPECIFIC PERSONALITY DISORDERS: ICD-10-CM

## 2023-04-18 DIAGNOSIS — G89.29 OTHER CHRONIC PAIN: ICD-10-CM

## 2023-04-18 LAB
GLUCOSE BLDC GLUCOMTR-MCNC: 154 MG/DL — HIGH (ref 70–99)
GLUCOSE BLDC GLUCOMTR-MCNC: 157 MG/DL — HIGH (ref 70–99)
GLUCOSE BLDC GLUCOMTR-MCNC: 220 MG/DL — HIGH (ref 70–99)
GLUCOSE BLDC GLUCOMTR-MCNC: 317 MG/DL — HIGH (ref 70–99)

## 2023-04-18 PROCEDURE — 99231 SBSQ HOSP IP/OBS SF/LOW 25: CPT

## 2023-04-18 RX ORDER — LORATADINE 10 MG/1
10 TABLET ORAL DAILY
Refills: 0 | Status: DISCONTINUED | OUTPATIENT
Start: 2023-04-18 | End: 2023-04-26

## 2023-04-18 RX ORDER — HYDROXYZINE HCL 10 MG
50 TABLET ORAL EVERY 6 HOURS
Refills: 0 | Status: DISCONTINUED | OUTPATIENT
Start: 2023-04-18 | End: 2023-04-26

## 2023-04-18 RX ADMIN — Medication 25 MILLIGRAM(S): at 17:10

## 2023-04-18 RX ADMIN — INSULIN GLARGINE 30 UNIT(S): 100 INJECTION, SOLUTION SUBCUTANEOUS at 20:32

## 2023-04-18 RX ADMIN — Medication 2: at 16:13

## 2023-04-18 RX ADMIN — Medication 8 UNIT(S): at 11:16

## 2023-04-18 RX ADMIN — LORATADINE 10 MILLIGRAM(S): 10 TABLET ORAL at 11:20

## 2023-04-18 RX ADMIN — Medication 650 MILLIGRAM(S): at 16:19

## 2023-04-18 RX ADMIN — ESCITALOPRAM OXALATE 20 MILLIGRAM(S): 10 TABLET, FILM COATED ORAL at 11:18

## 2023-04-18 RX ADMIN — Medication 75 MILLIGRAM(S): at 11:18

## 2023-04-18 RX ADMIN — Medication 50 MILLIGRAM(S): at 11:19

## 2023-04-18 RX ADMIN — Medication 8 UNIT(S): at 16:12

## 2023-04-18 RX ADMIN — Medication 4: at 11:17

## 2023-04-18 RX ADMIN — Medication 325 MILLIGRAM(S): at 11:19

## 2023-04-18 NOTE — BH INPATIENT PSYCHIATRY PROGRESS NOTE - NSBHASSESSSUMMFT_PSY_ALL_CORE
37F who is domiciled at [facility] with mother, unemployed, single, with PMHx of T1DM, Diabetic neuropathy, HTN, chronic back pain, migraine headaches, PPHx of anxiety/depression, 5 or 6 prior lifetime psychiatric admissions for suicide attempts  (once by taking a capful of acetone, another by drinking hydrogen peroxide, another attempt by taking half a bottle of ibuprofen), recently discharged from [facility], presented for vague suicidal thoughts after romantic rejection.  04/17/23 Patient somewhat subdued but future oriented, eager to learn skills to help with feelings of rejection.   04/18/23 Patient had two pseudoseizures yesterday, voiced sedation on standing Atarax (will change to PRN), appears interested in trying DBT, will try making pro's and con's list for two of her problematic behaviors for tomorrow    Impression: Cluster B traits vs Anxiety vs Depression    #ARMAAN #Cluster B Traits #Depression  - Continue Lexapro 20 mg  - Consider augmentation with abilify or buproprion  - Treatment mainly centered on creating a validating environment for patient, and increasing insight into the nature of her anxieties and interpersonal difficulties  - DBT worksheets    #Agitation  -For episodes of acute agitation can offer PO Haldol 5 mg/Ativan 2 mg/Benadryl 50 mg Q6H PRN. If refusing PO and is in acute risk of harm to self/other, can offer IM Haldol 5 mg/Ativan 2 mg/Benadryl 50 mg Q6H PRN. If given, please repeat EKG and hold antipsychotics if QTC>500     #Diabetes   - DC insulin pump while on inpatient unit  - Lantus 30 at bedtime  - Lispro 8 TID  - Sliding scale insulin    #HTN  - Metoprolol 25 mg BID    #Anemia  - Ferrous sulfate    #Nausea  - PRN Odansetron

## 2023-04-19 ENCOUNTER — APPOINTMENT (OUTPATIENT)
Dept: PSYCHIATRY | Facility: CLINIC | Age: 38
End: 2023-04-19

## 2023-04-19 LAB
ANION GAP SERPL CALC-SCNC: 11 MMOL/L — SIGNIFICANT CHANGE UP (ref 7–14)
BASOPHILS # BLD AUTO: 0.05 K/UL — SIGNIFICANT CHANGE UP (ref 0–0.2)
BASOPHILS NFR BLD AUTO: 0.7 % — SIGNIFICANT CHANGE UP (ref 0–1)
BUN SERPL-MCNC: 13 MG/DL — SIGNIFICANT CHANGE UP (ref 10–20)
CALCIUM SERPL-MCNC: 9.9 MG/DL — SIGNIFICANT CHANGE UP (ref 8.4–10.5)
CHLORIDE SERPL-SCNC: 97 MMOL/L — LOW (ref 98–110)
CO2 SERPL-SCNC: 26 MMOL/L — SIGNIFICANT CHANGE UP (ref 17–32)
CREAT SERPL-MCNC: 0.6 MG/DL — LOW (ref 0.7–1.5)
EGFR: 118 ML/MIN/1.73M2 — SIGNIFICANT CHANGE UP
EOSINOPHIL # BLD AUTO: 0.31 K/UL — SIGNIFICANT CHANGE UP (ref 0–0.7)
EOSINOPHIL NFR BLD AUTO: 4.3 % — SIGNIFICANT CHANGE UP (ref 0–8)
GLUCOSE BLDC GLUCOMTR-MCNC: 169 MG/DL — HIGH (ref 70–99)
GLUCOSE BLDC GLUCOMTR-MCNC: 317 MG/DL — HIGH (ref 70–99)
GLUCOSE BLDC GLUCOMTR-MCNC: 361 MG/DL — HIGH (ref 70–99)
GLUCOSE BLDC GLUCOMTR-MCNC: 368 MG/DL — HIGH (ref 70–99)
GLUCOSE SERPL-MCNC: 205 MG/DL — HIGH (ref 70–99)
HCG UR QL: NEGATIVE — SIGNIFICANT CHANGE UP
HCT VFR BLD CALC: 45.5 % — SIGNIFICANT CHANGE UP (ref 37–47)
HGB BLD-MCNC: 14.7 G/DL — SIGNIFICANT CHANGE UP (ref 12–16)
IMM GRANULOCYTES NFR BLD AUTO: 0.4 % — HIGH (ref 0.1–0.3)
LYMPHOCYTES # BLD AUTO: 1.93 K/UL — SIGNIFICANT CHANGE UP (ref 1.2–3.4)
LYMPHOCYTES # BLD AUTO: 26.5 % — SIGNIFICANT CHANGE UP (ref 20.5–51.1)
MCHC RBC-ENTMCNC: 26.5 PG — LOW (ref 27–31)
MCHC RBC-ENTMCNC: 32.3 G/DL — SIGNIFICANT CHANGE UP (ref 32–37)
MCV RBC AUTO: 82.1 FL — SIGNIFICANT CHANGE UP (ref 81–99)
MONOCYTES # BLD AUTO: 0.92 K/UL — HIGH (ref 0.1–0.6)
MONOCYTES NFR BLD AUTO: 12.6 % — HIGH (ref 1.7–9.3)
NEUTROPHILS # BLD AUTO: 4.05 K/UL — SIGNIFICANT CHANGE UP (ref 1.4–6.5)
NEUTROPHILS NFR BLD AUTO: 55.5 % — SIGNIFICANT CHANGE UP (ref 42.2–75.2)
NRBC # BLD: 0 /100 WBCS — SIGNIFICANT CHANGE UP (ref 0–0)
PLATELET # BLD AUTO: 373 K/UL — SIGNIFICANT CHANGE UP (ref 130–400)
PMV BLD: 9.9 FL — SIGNIFICANT CHANGE UP (ref 7.4–10.4)
POTASSIUM SERPL-MCNC: 4.2 MMOL/L — SIGNIFICANT CHANGE UP (ref 3.5–5)
POTASSIUM SERPL-SCNC: 4.2 MMOL/L — SIGNIFICANT CHANGE UP (ref 3.5–5)
RBC # BLD: 5.54 M/UL — HIGH (ref 4.2–5.4)
RBC # FLD: 19.5 % — HIGH (ref 11.5–14.5)
SODIUM SERPL-SCNC: 134 MMOL/L — LOW (ref 135–146)
WBC # BLD: 7.29 K/UL — SIGNIFICANT CHANGE UP (ref 4.8–10.8)
WBC # FLD AUTO: 7.29 K/UL — SIGNIFICANT CHANGE UP (ref 4.8–10.8)

## 2023-04-19 PROCEDURE — 99231 SBSQ HOSP IP/OBS SF/LOW 25: CPT

## 2023-04-19 PROCEDURE — 73502 X-RAY EXAM HIP UNI 2-3 VIEWS: CPT | Mod: 26,LT

## 2023-04-19 PROCEDURE — 70450 CT HEAD/BRAIN W/O DYE: CPT | Mod: 26

## 2023-04-19 RX ORDER — INSULIN LISPRO 100/ML
10 VIAL (ML) SUBCUTANEOUS
Refills: 0 | Status: DISCONTINUED | OUTPATIENT
Start: 2023-04-19 | End: 2023-04-26

## 2023-04-19 RX ORDER — INSULIN GLARGINE 100 [IU]/ML
35 INJECTION, SOLUTION SUBCUTANEOUS AT BEDTIME
Refills: 0 | Status: DISCONTINUED | OUTPATIENT
Start: 2023-04-19 | End: 2023-04-26

## 2023-04-19 RX ADMIN — INSULIN GLARGINE 35 UNIT(S): 100 INJECTION, SOLUTION SUBCUTANEOUS at 20:28

## 2023-04-19 RX ADMIN — ESCITALOPRAM OXALATE 20 MILLIGRAM(S): 10 TABLET, FILM COATED ORAL at 08:46

## 2023-04-19 RX ADMIN — Medication 50 MILLIGRAM(S): at 16:40

## 2023-04-19 RX ADMIN — Medication 325 MILLIGRAM(S): at 08:46

## 2023-04-19 RX ADMIN — PANTOPRAZOLE SODIUM 40 MILLIGRAM(S): 20 TABLET, DELAYED RELEASE ORAL at 06:42

## 2023-04-19 RX ADMIN — Medication 8 UNIT(S): at 07:49

## 2023-04-19 RX ADMIN — Medication 8: at 07:49

## 2023-04-19 RX ADMIN — Medication 10 UNIT(S): at 11:49

## 2023-04-19 RX ADMIN — Medication 75 MILLIGRAM(S): at 08:45

## 2023-04-19 RX ADMIN — Medication 25 MILLIGRAM(S): at 06:42

## 2023-04-19 RX ADMIN — Medication 10: at 11:48

## 2023-04-19 NOTE — PROVIDER CONTACT NOTE (FALL NOTIFICATION) - ASSESSMENT
Patient alert, awake, oriented X 3, stated she tried to get up from the bed from felt weakness on both legs and fell, landed on her buttocks

## 2023-04-19 NOTE — PROVIDER CONTACT NOTE (FALL NOTIFICATION) - RECOMMENDATIONS
Seen and evaluated by TORY Lopez, ortho vital signs taken, no visible injuries noted, stated she has pain on left hip

## 2023-04-19 NOTE — BH SOCIAL WORK CONFIRMATION FOLLOW UP NOTE - NSCOMMENTS_PSY_ALL_CORE
Ania was readmitted to [facility] prior to her 4/19 appointment at [facility].  Pt does not demonstrate an ability to turn self in bed without assistance of staff. Patient and family understands importance in prevention of skin breakdown, ulcers, and potential infection. Hourly rounding in effect. RN skin check complete.   Devices in place include: Continuous monitoring equipment, diaper, NG tube.  Skin assessed under devices: Yes.  Confirmed HAPI identified on the following date: N/A   Location of HAPI: N/A.  Wound Care RN following: No.  The following interventions are in place: Pt is repositioned by staff Q2H and as needed, diaper changed frequently to prevent skin breakdown.

## 2023-04-19 NOTE — BH INPATIENT PSYCHIATRY PROGRESS NOTE - NSBHASSESSSUMMFT_PSY_ALL_CORE
37F who is domiciled at [facility] with mother, unemployed, single, with PMHx of T1DM, Diabetic neuropathy, HTN, chronic back pain, migraine headaches, PPHx of anxiety/depression, 5 or 6 prior lifetime psychiatric admissions for suicide attempts  (once by taking a capful of acetone, another by drinking hydrogen peroxide, another attempt by taking half a bottle of ibuprofen), recently discharged from [facility], presented for vague suicidal thoughts after romantic rejection.  04/17/23 Patient somewhat subdued but future oriented, eager to learn skills to help with feelings of rejection.   04/18/23 Patient had two pseudoseizures yesterday, voiced sedation on standing Atarax (will change to PRN), appears interested in trying DBT, will try making pro's and con's list for two of her problematic behaviors for tomorrow   04/19/2023  patient remains with thoughts of self-injury but they seem to be slightly diminished  Impression: Cluster B traits vs Anxiety vs Depression    #ARMAAN #Cluster B Traits #Depression  - Continue Lexapro 20 mg  - Consider augmentation with abilify or buproprion  - Treatment mainly centered on creating a validating environment for patient, and increasing insight into the nature of her anxieties and interpersonal difficulties  - DBT worksheets    #Agitation  -For episodes of acute agitation can offer PO Haldol 5 mg/Ativan 2 mg/Benadryl 50 mg Q6H PRN. If refusing PO and is in acute risk of harm to self/other, can offer IM Haldol 5 mg/Ativan 2 mg/Benadryl 50 mg Q6H PRN. If given, please repeat EKG and hold antipsychotics if QTC>500     #Diabetes   - DC insulin pump while on inpatient unit  - Lantus 30 at bedtime  - Lispro 8 TID  - Sliding scale insulin    #HTN  - Metoprolol 25 mg BID    #Anemia  - Ferrous sulfate    #Nausea  - PRN Odansetron

## 2023-04-19 NOTE — CHART NOTE - NSCHARTNOTEFT_GEN_A_CORE
Spoke with Troy in CT to expedite STAT CT of Head for In-house incidence. VS: 127/74, 112, 97.6, pox 99%, FS: 317.  Pt states she stood up from the bedside, became dizzy and slide down with her back against the bed to her buttocks.   Pt c/o left hip and buttock pain.  Denies striking her head.  PT is NAD post fall, cooperative and Alert and Orientated x3.  Head: No signs of trauma, no bumps, no bruising, no pain.  Upper Extremity:  FROMI, no tenderness or deformity of bilateral upper extremities.  Lower Extremiity:  INcrease pain with ROM with left hip, No deformity or bony prominences of Left hip.     Left lower and Right lower extremities: FROMI, no tenderness, or deformity of bilateral lower extremities.  36 y/o unwitnessed fall.  C/O left hip pain.    - CT of head..  Spoke with Troy in CT to expedite STAT CT of Head for In-house incidence.  - Xray of Left hip/pelvis  - recommend tylenol for pain. VS: 127/74, 112, 97.6, pox 99%, FS: 317.  Pt states she stood up from the bedside, became dizzy and slide down with her back against the bed to her buttocks.   Pt c/o left hip and buttock pain.  Denies striking her head.  PT is NAD post fall, cooperative and Alert and Orientated x3.  Head: No signs of trauma, no bumps, no bruising, no pain.  Upper Extremity:  FROMI, no tenderness or deformity of bilateral upper extremities.  Lower Extremiity:  INcrease pain with ROM with left hip, No deformity or bony prominences of Left hip.     Left lower and Right lower extremities: FROMI, no tenderness, or deformity of bilateral lower extremities.  38 y/o unwitnessed fall.  C/O left hip pain.    - CT of head..  Spoke with Troy in CT to expedite STAT CT of Head for In-house incidence.  - Xray of Left hip/pelvis  - recommend tylenol for pain.    follow up medical PA:  - obtain orthostatic vs  - CBC, BMP 11am  - increase lantus to 35 units, prandial lispro to 10 units tid  d/w Dr Suarez VS: 127/74, 112, 97.6, pox 99%, FS: 317.  Pt states she stood up from the bedside, became dizzy and slide down with her back against the bed to her buttocks.   Pt c/o left hip and buttock pain.  Denies striking her head.  PT is NAD post fall, cooperative and Alert and Orientated x3.  Head: No signs of trauma, no bumps, no bruising, no pain.  Upper Extremity:  FROMI, no tenderness or deformity of bilateral upper extremities.  Lower Extremiity:  INcrease pain with ROM with left hip, No deformity or bony prominences of Left hip.     Left lower and Right lower extremities: FROMI, no tenderness, or deformity of bilateral lower extremities.  38 y/o unwitnessed fall.  C/O left hip pain.    - CT of head..  Spoke with Troy in CT to expedite STAT CT of Head for In-house incidence.  - Xray of Left hip/pelvis  - recommend tylenol for pain.    follow up medical PA:  - CTH neg for acute process  - f/u pelvic/hip xray  - fall risk protocol  - obtain orthostatic vs  - CBC, BMP 11am  - increase lantus to 35 units, prandial lispro to 10 units tid  d/w Dr Suarez

## 2023-04-20 ENCOUNTER — NON-APPOINTMENT (OUTPATIENT)
Age: 38
End: 2023-04-20

## 2023-04-20 LAB
GLUCOSE BLDC GLUCOMTR-MCNC: 190 MG/DL — HIGH (ref 70–99)
GLUCOSE BLDC GLUCOMTR-MCNC: 198 MG/DL — HIGH (ref 70–99)
GLUCOSE BLDC GLUCOMTR-MCNC: 217 MG/DL — HIGH (ref 70–99)
GLUCOSE BLDC GLUCOMTR-MCNC: 297 MG/DL — HIGH (ref 70–99)
RAPID RVP RESULT: DETECTED
RV+EV RNA SPEC QL NAA+PROBE: DETECTED
SARS-COV-2 RNA SPEC QL NAA+PROBE: SIGNIFICANT CHANGE UP

## 2023-04-20 PROCEDURE — 99231 SBSQ HOSP IP/OBS SF/LOW 25: CPT

## 2023-04-20 RX ORDER — LAMOTRIGINE 25 MG/1
25 TABLET, ORALLY DISINTEGRATING ORAL DAILY
Refills: 0 | Status: DISCONTINUED | OUTPATIENT
Start: 2023-04-20 | End: 2023-04-23

## 2023-04-20 RX ADMIN — Medication 650 MILLIGRAM(S): at 06:08

## 2023-04-20 RX ADMIN — Medication 325 MILLIGRAM(S): at 08:09

## 2023-04-20 RX ADMIN — Medication 650 MILLIGRAM(S): at 12:29

## 2023-04-20 RX ADMIN — Medication 650 MILLIGRAM(S): at 07:34

## 2023-04-20 RX ADMIN — Medication 6: at 07:29

## 2023-04-20 RX ADMIN — Medication 10 UNIT(S): at 16:26

## 2023-04-20 RX ADMIN — ESCITALOPRAM OXALATE 20 MILLIGRAM(S): 10 TABLET, FILM COATED ORAL at 08:09

## 2023-04-20 RX ADMIN — Medication 25 MILLIGRAM(S): at 08:08

## 2023-04-20 RX ADMIN — Medication 25 MILLIGRAM(S): at 20:02

## 2023-04-20 RX ADMIN — Medication 10 UNIT(S): at 11:26

## 2023-04-20 RX ADMIN — Medication 2: at 16:26

## 2023-04-20 RX ADMIN — Medication 75 MILLIGRAM(S): at 08:08

## 2023-04-20 RX ADMIN — Medication 2: at 11:26

## 2023-04-20 RX ADMIN — INSULIN GLARGINE 35 UNIT(S): 100 INJECTION, SOLUTION SUBCUTANEOUS at 20:03

## 2023-04-20 RX ADMIN — PANTOPRAZOLE SODIUM 40 MILLIGRAM(S): 20 TABLET, DELAYED RELEASE ORAL at 07:30

## 2023-04-20 RX ADMIN — Medication 650 MILLIGRAM(S): at 13:00

## 2023-04-20 RX ADMIN — Medication 10 UNIT(S): at 07:30

## 2023-04-20 NOTE — BH INPATIENT PSYCHIATRY PROGRESS NOTE - NSBHASSESSSUMMFT_PSY_ALL_CORE
37F who is domiciled at [facility] with mother, unemployed, single, with PMHx of T1DM, Diabetic neuropathy, HTN, chronic back pain, migraine headaches, PPHx of anxiety/depression, 5 or 6 prior lifetime psychiatric admissions for suicide attempts  (once by taking a capful of acetone, another by drinking hydrogen peroxide, another attempt by taking half a bottle of ibuprofen), recently discharged from [facility], presented for vague suicidal thoughts after romantic rejection.  04/17/23 Patient somewhat subdued but future oriented, eager to learn skills to help with feelings of rejection.   04/18/23 Patient had two pseudoseizures yesterday, voiced sedation on standing Atarax (will change to PRN), appears interested in trying DBT, will try making pro's and con's list for two of her problematic behaviors for tomorrow   04/19/2023  patient remains with thoughts of self-injury but they seem to be slightly diminished  04/20/23 patient reports low mood which may be related to feeling physically ill, RVP+, agreeable to start lamictal    Impression: Cluster B traits vs Anxiety vs Depression    #ARMAAN #Cluster B Traits #Depression  - Continue Lexapro 20 mg  - Start lamictal 25 mg (04/20)  - Treatment mainly centered on creating a validating environment for patient, and increasing insight into the nature of her anxieties and interpersonal difficulties  - DBT worksheets    #Agitation  -For episodes of acute agitation can offer PO Haldol 5 mg/Ativan 2 mg/Benadryl 50 mg Q6H PRN. If refusing PO and is in acute risk of harm to self/other, can offer IM Haldol 5 mg/Ativan 2 mg/Benadryl 50 mg Q6H PRN. If given, please repeat EKG and hold antipsychotics if QTC>500     #Diabetes   - DC insulin pump while on inpatient unit  - Lantus 30 at bedtime  - Lispro 8 TID  - Sliding scale insulin    #HTN  - Metoprolol 25 mg BID    #Anemia  - Ferrous sulfate    #Nausea  - PRN Odansetron 37F who is domiciled at [facility] with mother, unemployed, single, with PMHx of T1DM, Diabetic neuropathy, HTN, chronic back pain, migraine headaches, PPHx of anxiety/depression, 5 or 6 prior lifetime psychiatric admissions for suicide attempts  (once by taking a capful of acetone, another by drinking hydrogen peroxide, another attempt by taking half a bottle of ibuprofen), recently discharged from [facility], presented for vague suicidal thoughts after romantic rejection.  04/17/23 Patient somewhat subdued but future oriented, eager to learn skills to help with feelings of rejection.   04/18/23 Patient had two pseudoseizures yesterday, voiced sedation on standing Atarax (will change to PRN), appears interested in trying DBT, will try making pro's and con's list for two of her problematic behaviors for tomorrow   04/19/2023  patient remains with thoughts of self-injury but they seem to be slightly diminished  04/20/23 patient reports low mood which may be related to feeling physically ill, RVP+, agreeable to start lamictal    Impression: Cluster B traits vs Anxiety vs Depression    #ARMAAN #Cluster B Traits #Depression  - Continue Lexapro 20 mg  - Start lamictal 25 mg (04/20)  - Treatment mainly centered on creating a validating environment for patient, and increasing insight into the nature of her anxieties and interpersonal difficulties  - DBT worksheets  - Attempt to put in contact with outpatient DBT; [facility] DBT program virtual over telehealth which is not accessible to [place] patients, but check [facility] partial program    #Agitation  -For episodes of acute agitation can offer PO Haldol 5 mg/Ativan 2 mg/Benadryl 50 mg Q6H PRN. If refusing PO and is in acute risk of harm to self/other, can offer IM Haldol 5 mg/Ativan 2 mg/Benadryl 50 mg Q6H PRN. If given, please repeat EKG and hold antipsychotics if QTC>500     #Diabetes   - DC insulin pump while on inpatient unit  - Lantus 30 at bedtime  - Lispro 8 TID  - Sliding scale insulin    #HTN  - Metoprolol 25 mg BID    #Anemia  - Ferrous sulfate    #Nausea  - PRN Odansetron

## 2023-04-21 LAB
GLUCOSE BLDC GLUCOMTR-MCNC: 187 MG/DL — HIGH (ref 70–99)
GLUCOSE BLDC GLUCOMTR-MCNC: 206 MG/DL — HIGH (ref 70–99)
GLUCOSE BLDC GLUCOMTR-MCNC: 274 MG/DL — HIGH (ref 70–99)
GLUCOSE BLDC GLUCOMTR-MCNC: 91 MG/DL — SIGNIFICANT CHANGE UP (ref 70–99)

## 2023-04-21 PROCEDURE — 99231 SBSQ HOSP IP/OBS SF/LOW 25: CPT

## 2023-04-21 RX ORDER — FLUTICASONE PROPIONATE 50 MCG
1 SPRAY, SUSPENSION NASAL
Refills: 0 | Status: COMPLETED | OUTPATIENT
Start: 2023-04-21 | End: 2023-04-24

## 2023-04-21 RX ORDER — IBUPROFEN 200 MG
400 TABLET ORAL EVERY 6 HOURS
Refills: 0 | Status: DISCONTINUED | OUTPATIENT
Start: 2023-04-21 | End: 2023-04-26

## 2023-04-21 RX ORDER — GUAIFENESIN/DEXTROMETHORPHAN 600MG-30MG
10 TABLET, EXTENDED RELEASE 12 HR ORAL EVERY 6 HOURS
Refills: 0 | Status: DISCONTINUED | OUTPATIENT
Start: 2023-04-21 | End: 2023-04-26

## 2023-04-21 RX ADMIN — Medication 25 MILLIGRAM(S): at 20:20

## 2023-04-21 RX ADMIN — PANTOPRAZOLE SODIUM 40 MILLIGRAM(S): 20 TABLET, DELAYED RELEASE ORAL at 06:29

## 2023-04-21 RX ADMIN — ESCITALOPRAM OXALATE 20 MILLIGRAM(S): 10 TABLET, FILM COATED ORAL at 08:01

## 2023-04-21 RX ADMIN — INSULIN GLARGINE 35 UNIT(S): 100 INJECTION, SOLUTION SUBCUTANEOUS at 20:20

## 2023-04-21 RX ADMIN — Medication 400 MILLIGRAM(S): at 11:59

## 2023-04-21 RX ADMIN — Medication 25 MILLIGRAM(S): at 08:01

## 2023-04-21 RX ADMIN — Medication 10 UNIT(S): at 16:15

## 2023-04-21 RX ADMIN — LAMOTRIGINE 25 MILLIGRAM(S): 25 TABLET, ORALLY DISINTEGRATING ORAL at 08:01

## 2023-04-21 RX ADMIN — Medication 10 UNIT(S): at 07:28

## 2023-04-21 RX ADMIN — Medication 400 MILLIGRAM(S): at 18:27

## 2023-04-21 RX ADMIN — Medication 325 MILLIGRAM(S): at 08:01

## 2023-04-21 RX ADMIN — Medication 400 MILLIGRAM(S): at 12:28

## 2023-04-21 RX ADMIN — Medication 4: at 11:28

## 2023-04-21 RX ADMIN — Medication 75 MILLIGRAM(S): at 08:00

## 2023-04-21 RX ADMIN — Medication 10 UNIT(S): at 11:28

## 2023-04-21 RX ADMIN — Medication 650 MILLIGRAM(S): at 02:00

## 2023-04-21 RX ADMIN — Medication 6: at 07:27

## 2023-04-21 RX ADMIN — Medication 1 SPRAY(S): at 20:21

## 2023-04-21 NOTE — BH INPATIENT PSYCHIATRY PROGRESS NOTE - NSBHASSESSSUMMFT_PSY_ALL_CORE
37F who is domiciled at [facility] with mother, unemployed, single, with PMHx of T1DM, Diabetic neuropathy, HTN, chronic back pain, migraine headaches, PPHx of anxiety/depression, 5 or 6 prior lifetime psychiatric admissions for suicide attempts  (once by taking a capful of acetone, another by drinking hydrogen peroxide, another attempt by taking half a bottle of ibuprofen), recently discharged from [facility], presented for vague suicidal thoughts after romantic rejection.  04/17/23 Patient somewhat subdued but future oriented, eager to learn skills to help with feelings of rejection.   04/18/23 Patient had two pseudoseizures yesterday, voiced sedation on standing Atarax (will change to PRN), appears interested in trying DBT, will try making pro's and con's list for two of her problematic behaviors for tomorrow   04/19/2023  patient remains with thoughts of self-injury but they seem to be slightly diminished  04/20/23 patient reports low mood which may be related to feeling physically ill, RVP+, agreeable to start lamictal  04/21/23 patient has no side effects from lamictal, open to partial hospitalization after discharge and DBT program    Impression: Cluster B traits vs Anxiety vs Depression    #ARMAAN #Cluster B Traits #Depression  - Continue Lexapro 20 mg  - Continue lamictal 25 mg (04/20)  - Treatment mainly centered on creating a validating environment for patient, and increasing insight into the nature of her anxieties and interpersonal difficulties  - DBT worksheets  - Attempt to put in contact with outpatient DBT; [facility] DBT program virtual over telehealth which is not accessible to [facility] patients, but check [facility] partial program    #Agitation  -For episodes of acute agitation can offer PO Haldol 5 mg/Ativan 2 mg/Benadryl 50 mg Q6H PRN. If refusing PO and is in acute risk of harm to self/other, can offer IM Haldol 5 mg/Ativan 2 mg/Benadryl 50 mg Q6H PRN. If given, please repeat EKG and hold antipsychotics if QTC>500     #Diabetes   - DC insulin pump while on inpatient unit  - Lantus 30 at bedtime  - Lispro 8 TID  - Sliding scale insulin    #HTN  - Metoprolol 25 mg BID    #Anemia  - Ferrous sulfate    #Nausea  - PRN Odansetron

## 2023-04-22 LAB
GLUCOSE BLDC GLUCOMTR-MCNC: 229 MG/DL — HIGH (ref 70–99)
GLUCOSE BLDC GLUCOMTR-MCNC: 253 MG/DL — HIGH (ref 70–99)
GLUCOSE BLDC GLUCOMTR-MCNC: 300 MG/DL — HIGH (ref 70–99)

## 2023-04-22 PROCEDURE — 99231 SBSQ HOSP IP/OBS SF/LOW 25: CPT | Mod: GC

## 2023-04-22 RX ORDER — INSULIN LISPRO 100/ML
8 VIAL (ML) SUBCUTANEOUS ONCE
Refills: 0 | Status: COMPLETED | OUTPATIENT
Start: 2023-04-22 | End: 2023-04-22

## 2023-04-22 RX ADMIN — Medication 4: at 17:18

## 2023-04-22 RX ADMIN — Medication 10 MILLILITER(S): at 20:33

## 2023-04-22 RX ADMIN — Medication 75 MILLIGRAM(S): at 08:31

## 2023-04-22 RX ADMIN — Medication 1 SPRAY(S): at 08:32

## 2023-04-22 RX ADMIN — PANTOPRAZOLE SODIUM 40 MILLIGRAM(S): 20 TABLET, DELAYED RELEASE ORAL at 07:45

## 2023-04-22 RX ADMIN — Medication 10 UNIT(S): at 12:13

## 2023-04-22 RX ADMIN — Medication 1 SPRAY(S): at 20:22

## 2023-04-22 RX ADMIN — ESCITALOPRAM OXALATE 20 MILLIGRAM(S): 10 TABLET, FILM COATED ORAL at 08:31

## 2023-04-22 RX ADMIN — LAMOTRIGINE 25 MILLIGRAM(S): 25 TABLET, ORALLY DISINTEGRATING ORAL at 08:31

## 2023-04-22 RX ADMIN — Medication 325 MILLIGRAM(S): at 08:31

## 2023-04-22 RX ADMIN — Medication 8 UNIT(S): at 20:21

## 2023-04-22 RX ADMIN — Medication 25 MILLIGRAM(S): at 20:23

## 2023-04-22 RX ADMIN — Medication 10 UNIT(S): at 07:44

## 2023-04-22 RX ADMIN — Medication 650 MILLIGRAM(S): at 12:31

## 2023-04-22 RX ADMIN — Medication 6: at 12:12

## 2023-04-22 RX ADMIN — Medication 25 MILLIGRAM(S): at 08:31

## 2023-04-22 RX ADMIN — Medication 10 UNIT(S): at 17:19

## 2023-04-22 RX ADMIN — Medication 6: at 07:40

## 2023-04-22 RX ADMIN — Medication 650 MILLIGRAM(S): at 13:10

## 2023-04-22 RX ADMIN — INSULIN GLARGINE 35 UNIT(S): 100 INJECTION, SOLUTION SUBCUTANEOUS at 20:22

## 2023-04-22 NOTE — BH INPATIENT PSYCHIATRY PROGRESS NOTE - NSBHASSESSSUMMFT_PSY_ALL_CORE
37F who is domiciled at [facility] with mother, unemployed, single, with PMHx of T1DM, Diabetic neuropathy, HTN, chronic back pain, migraine headaches, PPHx of anxiety/depression, 5 or 6 prior lifetime psychiatric admissions for suicide attempts  (once by taking a capful of acetone, another by drinking hydrogen peroxide, another attempt by taking half a bottle of ibuprofen), recently discharged from [facility], presented for vague suicidal thoughts after romantic rejection.  04/17/23 Patient somewhat subdued but future oriented, eager to learn skills to help with feelings of rejection.   04/18/23 Patient had two pseudoseizures yesterday, voiced sedation on standing Atarax (will change to PRN), appears interested in trying DBT, will try making pro's and con's list for two of her problematic behaviors for tomorrow   04/19/2023  patient remains with thoughts of self-injury but they seem to be slightly diminished  04/20/23 patient reports low mood which may be related to feeling physically ill, RVP+, agreeable to start lamictal  04/21/23 patient has no side effects from lamictal, open to partial hospitalization after discharge and DBT program  04/22/23 patient has no side effects from lamictal    Impression: Cluster B traits vs Anxiety vs Depression    #ARMAAN #Cluster B Traits #Depression  - Continue Lexapro 20 mg  - Continue lamictal 25 mg (04/20)  - Treatment mainly centered on creating a validating environment for patient, and increasing insight into the nature of her anxieties and interpersonal difficulties  - DBT worksheets  - Attempt to put in contact with outpatient DBT; [facility] DBT program virtual over telehealth which is not accessible to [facility] patients, but check [facility] partial program    #Agitation  -For episodes of acute agitation can offer PO Haldol 5 mg/Ativan 2 mg/Benadryl 50 mg Q6H PRN. If refusing PO and is in acute risk of harm to self/other, can offer IM Haldol 5 mg/Ativan 2 mg/Benadryl 50 mg Q6H PRN. If given, please repeat EKG and hold antipsychotics if QTC>500     #Diabetes   - DC insulin pump while on inpatient unit  - Lantus 30 at bedtime  - Lispro 8 TID  - Sliding scale insulin    #HTN  - Metoprolol 25 mg BID    #Anemia  - Ferrous sulfate    #Nausea  - PRN Odansetron

## 2023-04-23 DIAGNOSIS — F32.A DEPRESSION, UNSPECIFIED: ICD-10-CM

## 2023-04-23 DIAGNOSIS — F41.1 GENERALIZED ANXIETY DISORDER: ICD-10-CM

## 2023-04-23 LAB
GLUCOSE BLDC GLUCOMTR-MCNC: 100 MG/DL — HIGH (ref 70–99)
GLUCOSE BLDC GLUCOMTR-MCNC: 105 MG/DL — HIGH (ref 70–99)
GLUCOSE BLDC GLUCOMTR-MCNC: 306 MG/DL — HIGH (ref 70–99)
GLUCOSE BLDC GLUCOMTR-MCNC: 314 MG/DL — HIGH (ref 70–99)

## 2023-04-23 PROCEDURE — 99231 SBSQ HOSP IP/OBS SF/LOW 25: CPT

## 2023-04-23 RX ORDER — LAMOTRIGINE 25 MG/1
25 TABLET, ORALLY DISINTEGRATING ORAL EVERY 12 HOURS
Refills: 0 | Status: DISCONTINUED | OUTPATIENT
Start: 2023-04-24 | End: 2023-04-24

## 2023-04-23 RX ADMIN — ESCITALOPRAM OXALATE 20 MILLIGRAM(S): 10 TABLET, FILM COATED ORAL at 08:01

## 2023-04-23 RX ADMIN — INSULIN GLARGINE 35 UNIT(S): 100 INJECTION, SOLUTION SUBCUTANEOUS at 20:11

## 2023-04-23 RX ADMIN — Medication 1 SPRAY(S): at 08:02

## 2023-04-23 RX ADMIN — Medication 1 SPRAY(S): at 20:11

## 2023-04-23 RX ADMIN — Medication 650 MILLIGRAM(S): at 13:10

## 2023-04-23 RX ADMIN — Medication 10 UNIT(S): at 17:12

## 2023-04-23 RX ADMIN — LAMOTRIGINE 25 MILLIGRAM(S): 25 TABLET, ORALLY DISINTEGRATING ORAL at 08:01

## 2023-04-23 RX ADMIN — Medication 10 UNIT(S): at 08:35

## 2023-04-23 RX ADMIN — Medication 25 MILLIGRAM(S): at 08:01

## 2023-04-23 RX ADMIN — Medication 8: at 11:50

## 2023-04-23 RX ADMIN — Medication 325 MILLIGRAM(S): at 08:01

## 2023-04-23 RX ADMIN — Medication 8: at 08:35

## 2023-04-23 RX ADMIN — PANTOPRAZOLE SODIUM 40 MILLIGRAM(S): 20 TABLET, DELAYED RELEASE ORAL at 08:01

## 2023-04-23 RX ADMIN — Medication 10 UNIT(S): at 11:50

## 2023-04-23 RX ADMIN — Medication 650 MILLIGRAM(S): at 12:54

## 2023-04-23 RX ADMIN — Medication 75 MILLIGRAM(S): at 08:01

## 2023-04-23 RX ADMIN — Medication 25 MILLIGRAM(S): at 20:12

## 2023-04-23 NOTE — BH INPATIENT PSYCHIATRY PROGRESS NOTE - NSBHASSESSSUMMFT_PSY_ALL_CORE
Ms Mei is a 37 year old woman with a history of Anxiety and Depression , multiple medical problems, uses a walker to walk ,( diabetic Neuropathy and chronic pack kenna) who was admitted to the inpatient psychiatry floor for the management of suicidal ideations.   Patient feels less depressed today , is engaging in groups , and no longer has suicidal thoughts .  She  seems that her suicidal ideations are less intrusive and patient is working on being better able to manage her emotions.   At this time , patient continues to be a danger to herself and continues to need inpatient psychiatric hospitalization. We recommend continuing current psychotropic medications at the current doses however increase Lamictal to 25mg P.o BID since she is tolerating the medication, monitor for rash     Plan   1. Increase to Lamictal 25mg P.o BID   2. Continue current psychotropic medications   - Lexapro 20mg P.o daily   3. Continue 1 to 1 observation

## 2023-04-24 LAB
GLUCOSE BLDC GLUCOMTR-MCNC: 153 MG/DL — HIGH (ref 70–99)
GLUCOSE BLDC GLUCOMTR-MCNC: 241 MG/DL — HIGH (ref 70–99)
GLUCOSE BLDC GLUCOMTR-MCNC: 258 MG/DL — HIGH (ref 70–99)
GLUCOSE BLDC GLUCOMTR-MCNC: 396 MG/DL — HIGH (ref 70–99)
GLUCOSE BLDC GLUCOMTR-MCNC: 66 MG/DL — LOW (ref 70–99)

## 2023-04-24 PROCEDURE — 99231 SBSQ HOSP IP/OBS SF/LOW 25: CPT

## 2023-04-24 RX ORDER — LAMOTRIGINE 25 MG/1
25 TABLET, ORALLY DISINTEGRATING ORAL DAILY
Refills: 0 | Status: DISCONTINUED | OUTPATIENT
Start: 2023-04-24 | End: 2023-04-26

## 2023-04-24 RX ADMIN — Medication 25 MILLIGRAM(S): at 08:15

## 2023-04-24 RX ADMIN — Medication 10 UNIT(S): at 11:22

## 2023-04-24 RX ADMIN — Medication 10: at 11:22

## 2023-04-24 RX ADMIN — ESCITALOPRAM OXALATE 20 MILLIGRAM(S): 10 TABLET, FILM COATED ORAL at 08:14

## 2023-04-24 RX ADMIN — Medication 325 MILLIGRAM(S): at 08:14

## 2023-04-24 RX ADMIN — INSULIN GLARGINE 35 UNIT(S): 100 INJECTION, SOLUTION SUBCUTANEOUS at 20:07

## 2023-04-24 RX ADMIN — Medication 10 UNIT(S): at 16:31

## 2023-04-24 RX ADMIN — Medication 10 UNIT(S): at 07:26

## 2023-04-24 RX ADMIN — Medication 4: at 07:26

## 2023-04-24 RX ADMIN — Medication 25 MILLIGRAM(S): at 20:07

## 2023-04-24 RX ADMIN — Medication 1 SPRAY(S): at 08:15

## 2023-04-24 RX ADMIN — LAMOTRIGINE 25 MILLIGRAM(S): 25 TABLET, ORALLY DISINTEGRATING ORAL at 08:16

## 2023-04-24 RX ADMIN — PANTOPRAZOLE SODIUM 40 MILLIGRAM(S): 20 TABLET, DELAYED RELEASE ORAL at 06:38

## 2023-04-24 RX ADMIN — Medication 75 MILLIGRAM(S): at 08:14

## 2023-04-24 NOTE — ED ADULT NURSE NOTE - IS THE PATIENT ABLE TO BE SCREENED?
Physical Therapy Evaluation    Visit Type: Initial Evaluation  Visit: 1  Referring Provider: Camila Holloway PA-C  Medical Diagnosis (from order): Diagnosis Information    Diagnosis  E888.9 (ICD-9-CM) - W19.XXXA (ICD-10-CM) - Fall, initial encounter       Treatment Diagnosis: cervical - increased pain/symptoms, impaired posture and impaired range of motion.  Onset  - Date of onset: 4/14/2023  Chart reviewed at time of initial evaluation (relevant co-morbidities, allergies, tests and medications listed):   - Diagnostic tests reviewed: MRI studies  No past medical history on file.    Current Outpatient Medications:  acetaminophen (TYLENOL) 325 MG tablet, Take 2 tablets by mouth every 6 hours as needed for Pain.  ibuprofen (MOTRIN) 600 MG tablet, Take 1 tablet by mouth every 6 hours as needed for Pain.  lidocaine (LIDOCARE) 4 % patch, Place 3 patches onto the skin daily for 14 days. Do not start before April 19, 2023.  tiZANidine (ZANAFLEX) 2 MG tablet, Take 1 tablet by mouth every 8 hours as needed for Muscle spasms. May make you drowsy. Do not drive, operate heavy machinery or consume alcohol while taking. This is a muscle relaxant.    No current facility-administered medications for this encounter.        SUBJECTIVE                                                                                                               4/24/2023.  From hospital OR note:  ADMISSION DATE:  4/11/2023  DISCHARGE DATE:  4/18/2023  3:22 PM     ADMITTING DIAGNOSIS:     Sternal fracture  Presternal hematoma with active extravasation  Retrosternal hematoma  Cardiac contusion  Right hemothorax  Left pneumothorax  Right third rib fracture  Right fourth rib fracture  Right fifth rib fracture     SECONDARY DIAGNOSES:    Tachycardia  Hypotension  Leukocytosis  Acute blood loss anemia  Hyponatremia  Hypophosphatemia  Neck pain/paresthesias     CONSULTS:    Cardiology, Dr. Max Mock  Neurosurgery, Dr. Javi Hunter  Pulmonary Medicine, 
Dr. Paolo Orlando      PROCEDURES:    Right tube thoracostomy     HPI:  HPI   45 year old male patient that presented to the ED as a level I trauma s/p tree fell on patient. Per EMS: Patient was trimming a limb off a tree when the tree fell on top of the patient striking his chest. Patient did have LOC reportedly for 10 minutes. Patient was unconscious upon EMS arrival and was posturing. Patient regained consciousness en route. Patient was incontinent of urine. Patient is complaining of chest pain. Patient denies any neck pain, back pain, abdominal pain, numbness/tingling.      HOSPITAL COURSE:    This is a 45-year-old male who presented to [facility] as a level 1 trauma s/p crush injury.  Patient found to have multiple right rib fractures with associated right hemothorax and left pneumothorax thus patient underwent right tube thoracostomy.  Serial chest x-rays obtained as well as chest tube output monitoring.  Chest tube removed when appropriate.  Patient provided with pain control.  Patient noted to have sternal fracture with presternal hematoma and retrosternal hematoma thus CV surgery consulted and they recommended nonoperative management.  Patient noted to have a cardiac contusion thus cardiology consulted and echo was obtained. Vital signs monitored throughout admission.  Patient noted to have neck pain/paresthesias thus MRI obtained and neurosurgery consulted they recommended cervical collar for comfort and to follow-up as needed as an outpatient.  Patient noted to have leukocytosis which did resolve.  Patient found to have acute blood loss anemia which is likely due to injuries and dilution, patient also arrived anemic.  Patient was transfused 2 units PRBC during admission.  Patient's electrolytes were monitored throughout admission.  Patient provided with nutrition during admission.    IMPRESSION:  1. No acute traumatic abnormality.  2.  Mild asymmetric left posterior disc bulge at C3-C6-C7.  No critical  spinal 
canal or neural foraminal stenosis.    The patient presents today with L posterior neck stiffness, that is intermittent.  Symptoms are worse with sedentary activities such as sitting or lying around.  He is not sure what relieve these symptoms other than movement.  He reports tingling in the L hand (maybe not involving the L thumb).  This is also intermittent and started while he was hospitalized.  He started to notice this while hospitalized.  At that time, he was also feeling L LE tingling and numbness located at the lateral aspect of the L thigh, leg and all toes of the foot.  The LE symptoms have improved, but not resolved.  While hospitalized, he was placed in a cervical collar which dramatically improved both the UE and the LE symptoms.  SOC:  Sales    Pain / Symptoms  - Patient denies pain / symptoms.    Function:   Limitations / Exacerbation Factors:   - Patient reports difficulty with function reported below.  - bed mobility, work - limited or modified, house/yard work  Prior Level of Function: no limitation in involved extremity,    Patient Goals: . Eliminate tingling and stiffness in the L UE and improve posture.    Prior treatment  - no therapies  - Discharged from hospital, home health, or skilled nursing facility in last 30 days: yes  Home Environment   - Patient lives with: alone  - Type of home: single level home  - Assistance available: as needed  - Denies 2 or more falls or an unexplained fall with injury in the last year.  - Feel safe at home / work / school: yes      OBJECTIVE                                                                                                                    Posture:  Increased thoracic kyphosis, slight forward head  1/2 baseball diameter hematoma at the anterior chest wall  Bruising through the abdomen      Range of Motion (ROM)   (degrees unless noted; active unless noted; norms in ( ); negative=lacking to 0, positive=beyond 0)  WNL: LUE, RUE  Cervical:    - 
Flexion (45-50): 54°    - Extension (45-60): 38°    - Rotation (60-80):        • Left: 78°        • Right: 80°    - Side Bend (45):        • Left: 44°        • Right: 38°  Comments: Cervical spine extension occurs at mid cervical.  Little to no contribution from upper or lower cervical spine    Strength  (out of 5 unless noted, standard test position unless noted)   Shoulder:     - Elevation:         • Left (C4): 5        • Right (C4): 5    - Abduction:        • Left: 5        • Right: 5  Elbow/Forearm:    - Flexion:        • Left: 5        • Right: 5   Wrist:    - Extension:        • Left: 5        • Right: 5    - Finger Abduction:        • Left (T1): 5        • Right (T1): 5  Hip:    - Flexion:        • Left: 5        • Right: 5  Knee:    - Extension:        • Left: 5        • Right: 5  Ankle:    - Dorsiflexion:        • Left: 5        • Right: 5    - Plantar Flexion:        • Left: 5        • Right: 5    - Inversion:        • Left: 5         • Right: 5    - Eversion:        • Left: 5         • Right: 5           Special Tests  Manual cervical traction did not change UE paresthesia    Deep Tendon Reflexes  - Biceps (C5): • Left: 2+ (normal) • Right: 2+ (normal)  - Brachioradialis (C6): • Left: 2+ (normal) • Right: 2+ (normal)  - Triceps (C7): • Left: 2+ (normal) • Right: 1+ (diminished)    Reflex Testing   Median nerve tension signs were unremarkable R and L UE             Outcome/Assessments  Outcome Measures:   Neck Disability Index (NDI): Neck Disability Index Score: 15  NDI Total Possible Score: 50  NDI Score Calculated: 30 %  (scored 0-100, higher score indicates higher disability) see flowsheet for additional documentation      Treatment     Therapeutic Exercise  Examination results were reviewed with the patient and the role of therapeutic exercise in resolving the function loss and pain was discussed.   See HEP section of this note.  These exercises were instructed and performed.    Home Exercise 
Program  Overhead reach at least 5x/day  Continue walking and increase velocity as able to do with breathing difficulty  Active scapular retraction to open chest wall flexibility  Continue to use heat over the chest hematoma      ASSESSMENT                                                                                                          45 year old patient has reported functional limitations listed above impacted by signs and symptoms consistent with treatment diagnosis below.  Treatment Diagnosis:   - Involved: cervical.  - Symptoms/impairments: increased pain/symptoms, impaired posture and impaired range of motion.    The patient presents to physical therapy with the following deficits:  1.  NDI Score Calculated: 30 %.  2.  Large chest hematoma.  3.  Loss of cervical BB.    As a result of these deficits the patient's function level has also been impaired as manifest by their functional tool score (NDI Score Calculated: 30 %).  The patient will benefit from skilled PT intervention that will focus on patient education, therapeutic exercise, and manual therapy techniques.  Failure to intervene could lead to permanent function loss and could lead to increased chronicity of this disorder.    Statement of medical necessity:  At this time, the patient is not able to look overhead due to motion loss at the cervical spine.      PROBLEM LIST:  1.  NDI Score Calculated: 30 %.    2.  Large chest hematoma.    3.  Loss of cervical BB.    - Extension (45-60): 38°  (all mid cervical motion)          Prognosis: patient will benefit from skilled therapy  Rehabilitative potential is: good.  Predicted patient presentation: Low (stable) - Patient comorbidities and complexities, as defined above, will have little effect on progress for prescribed plan of care.  Education:   - Present and ready to learn: patient    PLAN                                                                                                                 
        The following skilled interventions to be implemented to achieve goals listed below:  Neuromuscular Re-Education (30447)  Therapeutic Activity (85646)  Therapeutic Exercise (59172)  Manual Therapy (80808)    Frequency / Duration  1 times per week tapering as patient progresses for 6 weeks for an estimated total of 4 visits    Patient involved in and agreed to plan of care and goals.    Suggestions for next session as indicated: Progress per plan of care  Assess effects of healing with time and with HEP      Goals  Long Term Goals: to be met by end of plan of care  1. The patient will extend the cervical spine 0-50 degrees R and L to allow for looking up as needed to perform overhead tasks.  2. The patient will be independent with an evolving HEP.  3. The patient will improve their overall function level as manifest by an NDI score of 10% or less.  4. The patient's pain level will reduce to the point of the patient being able to complete all ADL's with manageable difficulty.    Assistant to continue with current plan of care, current goals are appropriate, patient progressing towards set goals  Assistant to modify based on patient progress and response to treatment.        Therapy procedure time and total treatment time can be found documented on the Time Entry flowsheet    
5
Yes

## 2023-04-24 NOTE — BH INPATIENT PSYCHIATRY PROGRESS NOTE - NSBHASSESSSUMMFT_PSY_ALL_CORE
37F who is domiciled at [facility] with mother, unemployed, single, with PMHx of T1DM, Diabetic neuropathy, HTN, chronic back pain, migraine headaches, PPHx of anxiety/depression, 5 or 6 prior lifetime psychiatric admissions for suicide attempts  (once by taking a capful of acetone, another by drinking hydrogen peroxide, another attempt by taking half a bottle of ibuprofen), recently discharged from [facility], presented for vague suicidal thoughts after romantic rejection.  04/17/23 Patient somewhat subdued but future oriented, eager to learn skills to help with feelings of rejection.   04/18/23 Patient had two pseudoseizures yesterday, voiced sedation on standing Atarax (will change to PRN), appears interested in trying DBT, will try making pro's and con's list for two of her problematic behaviors for tomorrow   04/19/2023  patient remains with thoughts of self-injury but they seem to be slightly diminished  04/20/23 patient reports low mood which may be related to feeling physically ill, RVP+, agreeable to start lamictal  04/21/23 patient has no side effects from lamictal, open to partial hospitalization after discharge and DBT program  04/24/23 patient continues to report no side effects from lamictal, still open to partial [facility] hospitalization program, anticipated DC on Wednesday    Impression: Cluster B traits vs Anxiety vs Depression    #ARMAAN #Cluster B Traits #Depression  - Continue Lexapro 20 mg  - Continue lamictal 25 mg (started 04/20), increase to 50 mg on 05/04  - Treatment centered on creating a validating environment for patient, and increasing insight into the nature of her anxieties and interpersonal difficulties  - DBT worksheets  - Referral to DBT  - Referral to [facility] partial hospitalization program after discharge, confirm with [facility] that telehealth is possible    #Agitation  -For episodes of acute agitation can offer PO Haldol 5 mg/Ativan 2 mg/Benadryl 50 mg Q6H PRN. If refusing PO and is in acute risk of harm to self/other, can offer IM Haldol 5 mg/Ativan 2 mg/Benadryl 50 mg Q6H PRN. If given, please repeat EKG and hold antipsychotics if QTC>500     #URI  - RVP+, COVID negative  - Vitals WNL    #Diabetes   - DC insulin pump while on inpatient unit  - Lantus 35 at bedtime  - Lispro 10 TID  - Sliding scale insulin    #HTN  - Metoprolol 25 mg BID    #Anemia  - Ferrous sulfate    #Nausea  - PRN Odansetron

## 2023-04-25 LAB
GLUCOSE BLDC GLUCOMTR-MCNC: 176 MG/DL — HIGH (ref 70–99)
GLUCOSE BLDC GLUCOMTR-MCNC: 215 MG/DL — HIGH (ref 70–99)
GLUCOSE BLDC GLUCOMTR-MCNC: 216 MG/DL — HIGH (ref 70–99)
GLUCOSE BLDC GLUCOMTR-MCNC: 259 MG/DL — HIGH (ref 70–99)
GLUCOSE BLDC GLUCOMTR-MCNC: 271 MG/DL — HIGH (ref 70–99)

## 2023-04-25 PROCEDURE — 99231 SBSQ HOSP IP/OBS SF/LOW 25: CPT

## 2023-04-25 RX ORDER — ESCITALOPRAM OXALATE 10 MG/1
1 TABLET, FILM COATED ORAL
Qty: 14 | Refills: 1
Start: 2023-04-25 | End: 2023-05-22

## 2023-04-25 RX ORDER — LAMOTRIGINE 25 MG/1
1 TABLET, ORALLY DISINTEGRATING ORAL
Qty: 14 | Refills: 0
Start: 2023-04-25 | End: 2023-05-08

## 2023-04-25 RX ORDER — FERROUS SULFATE 325(65) MG
1 TABLET ORAL
Qty: 14 | Refills: 0
Start: 2023-04-25 | End: 2023-05-08

## 2023-04-25 RX ORDER — HYDROXYZINE HCL 10 MG
1 TABLET ORAL
Qty: 56 | Refills: 0
Start: 2023-04-25 | End: 2023-05-08

## 2023-04-25 RX ORDER — IBUPROFEN 200 MG
1 TABLET ORAL
Qty: 0 | Refills: 0 | DISCHARGE
Start: 2023-04-25

## 2023-04-25 RX ORDER — LORATADINE 10 MG/1
1 TABLET ORAL
Qty: 14 | Refills: 0
Start: 2023-04-25 | End: 2023-05-08

## 2023-04-25 RX ORDER — PANTOPRAZOLE SODIUM 20 MG/1
1 TABLET, DELAYED RELEASE ORAL
Qty: 14 | Refills: 0
Start: 2023-04-25 | End: 2023-05-08

## 2023-04-25 RX ORDER — ONDANSETRON 8 MG/1
1 TABLET, FILM COATED ORAL
Qty: 6 | Refills: 0
Start: 2023-04-25 | End: 2023-05-08

## 2023-04-25 RX ORDER — AMITRIPTYLINE HCL 25 MG
1 TABLET ORAL
Qty: 14 | Refills: 0
Start: 2023-04-25 | End: 2023-05-08

## 2023-04-25 RX ORDER — METOPROLOL TARTRATE 50 MG
1 TABLET ORAL
Qty: 28 | Refills: 0
Start: 2023-04-25 | End: 2023-05-08

## 2023-04-25 RX ADMIN — Medication 2: at 16:24

## 2023-04-25 RX ADMIN — Medication 4: at 11:32

## 2023-04-25 RX ADMIN — Medication 10 UNIT(S): at 07:25

## 2023-04-25 RX ADMIN — PANTOPRAZOLE SODIUM 40 MILLIGRAM(S): 20 TABLET, DELAYED RELEASE ORAL at 06:38

## 2023-04-25 RX ADMIN — Medication 10 UNIT(S): at 11:32

## 2023-04-25 RX ADMIN — Medication 6: at 07:25

## 2023-04-25 RX ADMIN — INSULIN GLARGINE 35 UNIT(S): 100 INJECTION, SOLUTION SUBCUTANEOUS at 20:13

## 2023-04-25 RX ADMIN — LAMOTRIGINE 25 MILLIGRAM(S): 25 TABLET, ORALLY DISINTEGRATING ORAL at 08:09

## 2023-04-25 RX ADMIN — Medication 325 MILLIGRAM(S): at 08:09

## 2023-04-25 RX ADMIN — Medication 25 MILLIGRAM(S): at 20:13

## 2023-04-25 RX ADMIN — Medication 650 MILLIGRAM(S): at 21:40

## 2023-04-25 RX ADMIN — ESCITALOPRAM OXALATE 20 MILLIGRAM(S): 10 TABLET, FILM COATED ORAL at 08:09

## 2023-04-25 RX ADMIN — Medication 75 MILLIGRAM(S): at 08:09

## 2023-04-25 RX ADMIN — Medication 650 MILLIGRAM(S): at 21:01

## 2023-04-25 RX ADMIN — Medication 25 MILLIGRAM(S): at 08:09

## 2023-04-25 RX ADMIN — Medication 10 UNIT(S): at 16:25

## 2023-04-25 NOTE — BH INPATIENT PSYCHIATRY PROGRESS NOTE - NSBHMETABOLIC_PSY_ALL_CORE_FT
BMI: BMI (kg/m2): 31.4 (04-15-23 @ 16:42)  HbA1c: A1C with Estimated Average Glucose Result: 9.2 % (04-07-23 @ 08:11)    Glucose: POCT Blood Glucose.: 253 mg/dL (04-22-23 @ 11:21)    BP: 134/69 (04-22-23 @ 07:32) (112/80 - 134/80)  Lipid Panel: 
BMI: BMI (kg/m2): 31.4 (04-15-23 @ 16:42)  HbA1c: A1C with Estimated Average Glucose Result: 9.2 % (04-07-23 @ 08:11)    Glucose: POCT Blood Glucose.: 169 mg/dL (04-19-23 @ 15:56)    BP: 112/80 (04-19-23 @ 15:38) (103/78 - 147/77)  Lipid Panel: 
BMI: BMI (kg/m2): 31.4 (04-15-23 @ 16:42)  HbA1c: A1C with Estimated Average Glucose Result: 9.2 % (04-07-23 @ 08:11)    Glucose: POCT Blood Glucose.: 306 mg/dL (04-23-23 @ 11:00)    BP: 137/73 (04-23-23 @ 08:01) (125/80 - 137/73)  Lipid Panel: 
BMI: BMI (kg/m2): 31.4 (04-15-23 @ 16:42)  HbA1c: A1C with Estimated Average Glucose Result: 9.2 % (04-07-23 @ 08:11)    Glucose: POCT Blood Glucose.: 190 mg/dL (04-20-23 @ 10:50)    BP: 126/70 (04-20-23 @ 09:01) (103/78 - 147/77)  Lipid Panel: 
BMI: BMI (kg/m2): 31.4 (04-15-23 @ 16:42)  HbA1c: A1C with Estimated Average Glucose Result: 9.2 % (04-07-23 @ 08:11)    Glucose: POCT Blood Glucose.: 352 mg/dL (04-16-23 @ 12:03)    BP: 96/51 (04-16-23 @ 08:38) (96/51 - 160/78)  Lipid Panel: 
BMI: BMI (kg/m2): 31.4 (04-15-23 @ 16:42)  HbA1c: A1C with Estimated Average Glucose Result: 9.2 % (04-07-23 @ 08:11)    Glucose: POCT Blood Glucose.: 220 mg/dL (04-18-23 @ 11:02)    BP: 128/78 (04-18-23 @ 09:38) (96/51 - 160/78)  Lipid Panel: 
BMI: BMI (kg/m2): 31.4 (04-15-23 @ 16:42)  HbA1c: A1C with Estimated Average Glucose Result: 9.2 % (04-07-23 @ 08:11)    Glucose: POCT Blood Glucose.: 259 mg/dL (04-25-23 @ 10:30)    BP: 123/71 (04-25-23 @ 08:57) (123/61 - 161/77)  Lipid Panel: 
BMI: BMI (kg/m2): 31.4 (04-15-23 @ 16:42)  HbA1c: A1C with Estimated Average Glucose Result: 9.2 % (04-07-23 @ 08:11)    Glucose: POCT Blood Glucose.: 305 mg/dL (04-17-23 @ 11:01)    BP: 118/71 (04-17-23 @ 08:20) (96/51 - 160/78)  Lipid Panel: 
BMI: BMI (kg/m2): 31.4 (04-15-23 @ 16:42)  HbA1c: A1C with Estimated Average Glucose Result: 9.2 % (04-07-23 @ 08:11)    Glucose: POCT Blood Glucose.: 206 mg/dL (04-21-23 @ 11:06)    BP: 134/80 (04-21-23 @ 08:05) (112/80 - 147/77)  Lipid Panel: 
BMI: BMI (kg/m2): 31.4 (04-15-23 @ 16:42)  HbA1c: A1C with Estimated Average Glucose Result: 9.2 % (04-07-23 @ 08:11)    Glucose: POCT Blood Glucose.: 396 mg/dL (04-24-23 @ 11:15)    BP: 161/77 (04-23-23 @ 15:48) (127/85 - 161/77)  Lipid Panel:

## 2023-04-25 NOTE — BH INPATIENT PSYCHIATRY PROGRESS NOTE - NSBHMSEBODY_PSY_A_CORE
Average build
Overweight
Average build

## 2023-04-25 NOTE — BH INPATIENT PSYCHIATRY PROGRESS NOTE - NSBHMSEMOOD_PSY_A_CORE
Depressed
Normal/Other
Depressed
Normal/Other

## 2023-04-25 NOTE — BH INPATIENT PSYCHIATRY PROGRESS NOTE - NSBHMSEMUSCLE_PSY_A_CORE
Unable to assess
Normal muscle tone/strength
Unable to assess
Unable to assess
Normal muscle tone/strength

## 2023-04-25 NOTE — BH TREATMENT PLAN - NSTXCOPEINTERSW_PSY_ALL_CORE
Sw will provide support contacts education and referrals for healthy coping skills.
Sw will provide support contacts education and referrals for healthy coping skills.

## 2023-04-25 NOTE — BH INPATIENT PSYCHIATRY PROGRESS NOTE - NSBHMSEBEHAV_PSY_A_CORE
Cooperative
Uncooperative
Other
Cooperative

## 2023-04-25 NOTE — BH INPATIENT PSYCHIATRY PROGRESS NOTE - NSBHFUPINTERVALHXFT_PSY_A_CORE
Nursing reports no acute events overnight. Per chart review the has not required any behavioral PRNS since last assessment, has required ones for pain.   Remains on 1:1 for fall risk    Chart reviewed, patient seen and evaluated in AM. Patient appears euthymic, and appeared eager to converse. At first she appeared hesitant to go to [facility] because she knows that Jesus gets therapy there. Once it was explained that she would be able to do the program via telehealth she was more agreeable. The writer spoke with her about avoidant behaviors, and how she can't control other people's reactions, but how she can control her own, and how she would learn more skills through DBT. She stated that she has not noticed any adverse reactions to the lamictal, and explicitly no rash. She voiced understanding that the writer would reduce her lamictal to 25 mg daily again with the plan to gradually increase.    She denies any thoughts of self harm or suicidal ideation.
Nursing reports no acute events overnight. Per chart review the patient has not required any behavioral PRNS since the last assessment.    Chart reviewed, patient seen and evaluated in AM.  Patient reports feeling somewhat tired.  We wonder whether it is depression or medication affect but basically rule those out since she is quite familiar with them.  She is afebrile feels achy.  Psychiatrically  she remains   "shaky" that is  Frequently feeling unsafe with respect to self-harm.  Does seem to be improving however.
Nursing reports no acute events overnight. Per chart review the patient received Atarax 50 mg for anxiety since the last assessment.  Patient RVP +, on droplet isolation. On 1:1 for fall risk    Chart reviewed, patient seen and evaluated in AM. On approach, patient was sitting up in bed, eating a meal. Patient reports feeling "under the weather" in regards to her physical health, which she believes may be affecting her emotional state slightly. She states that she has not yet had a chance to do the DBT assignment we had discussed. She is agreeable to starting Lamictal, and voiced understanding to look out for rash formation.  Patient endorsed OK sleep and appetite.     No SI/HI/AVH elicited. 
Chart reviewed , discussed with staff   and patient evaluated.  no  acute event over night .  reports  feeling tired  and  depress.  She  denies  s/h ideations intent o plan.    denies a/v hallucinations. 
Nursing reports no acute events overnight. Per chart review the has not required any behavioral PRNS since last assessment, has required ones for pain.   Remains on 1:1 for fall risk    Chart reviewed, patient seen and evaluated in AM. On approach, patient was sitting up in bed. She appeared euthymic, and was open to speaking with the writer.  She states that she felt a bit low yesterday because of Jesus, but was able to fall asleep and stop thinking about it eventually. She is agreeable to go to partial hospitalization program at [facility] after discharge, followed by a DBT program. She denies any side effects of the newly started lamictal.    She denies any thoughts of self harm or suicidal ideation.
Nursing reports no acute events overnight. Per chart review the has not required any behavioral PRNS since last assessment.   Remains on 1:1 for fall risk    Chart reviewed, patient seen and evaluated in AM. Patient was lying in bed, sleeping very soundly in no acute distress. She was arousable only to deep pressure, at which point she stated "let me sleep". Fingerstick glucose was taken as a precaution because the patient was difficult to arouse, it was 257. Per PCA the patient had just told her that she did not wish to go to group in order to sleep some more.   
Patient seen and interviewed, 1 to 1 at patient's side .   She reports feeling better , no longer having suicidal thoughts and doing her best to think good thoughts . She reports that she is tolerating the medications and has no side effects at this time. patient was offered an increase in the dose of Lamictal for better control of her depressed mood  and she was agreeable  le .     According to nursing staff, patient is doing well , complaint with medications , is in good behavioral control.   
Nursing reports no acute events overnight. Per chart review the patient has not required any behavioral PRNS since the last assessment.    Chart reviewed, patient seen and evaluated in AM. On approach, patient was sitting on her bed, lost in thought. Though she seemed a little dysthymic then, she quickly became more bright and cheerful once she began speaking. Patient reports feeling "a little better" though still felt rather "down" last night after dinner when thinking about Jesus. She reports having had two pseudoseizures yesterday, though she is unsure what triggered them. The first occurred at 13:00, the second at 13:30 when her mother was visiting her. The patient alerted the writer that she has been receiving Atarax standing while on the unit instead of PRN, which is causing her to feel sleepy. In addition the patient states that she has been suffering from allergies, and the coughing from them makes her muscles feel achey.     The writer introduced DBT to the patient, and went over making pro's and con's lists for behaviors. The patient appears interested and states she will practice making these lists      Denied ideations of self harm or suicide.
Nursing reports no acute events overnight. Per chart review the patient has not required any behavioral PRNS since the last assessment.    Chart reviewed, patient seen and evaluated in AM. On approach, patient appeared mildly subdued, but was easily conversable and brightened up while talking. Patient reports that she felt somewhat suicidal after Doe (the man that she had met while on the inpatient unit) ditched her at [facility] because she started doubting her self worth, wondering why she's "always rejected, always attracting the wrong kind of biju". She stated that these thoughts provoked some vague suicidal thoughts, but she denied any plan. A nurse at [facility] told her that she "didn't look well", and reccomended that she go back to the hospital. THe patient voiced questions about the accuracy of her diagnosis and her medications, and the writer discussed that with her that a large part of her difficulties are her perception of self, her high sensitivity, and the kinds of interpersonal relationships she has, all of which are not generally helped very much by medication but can be helped with by therapy. The patient voiced understanding.    Patient denies suicidal/homicidal ideation, intent, or plan on interview.      
Nursing reports no acute events overnight. Per chart review the has not required any behavioral PRNs since last assessment. Remains on 1:1 for fall risk    Chart reviewed, patient seen and evaluated in AM. On approach, patient laying in bed, stating she has a cold. Appears uncomfortable. Mood is "the same as yesterday," some depression. Continues to deny side effects of the lamictal use. She denies any thoughts of self harm or suicidal ideation.

## 2023-04-25 NOTE — BH TREATMENT PLAN - NSTXPLANTHERAPYSESSIONSFT_PSY_ALL_CORE
04-11-23  Type of therapy: Coping skills,Creative arts therapy,Leisure development,Self esteem,Stress management  Type of session: Group  Level of patient participation: Participated with encouragement  Duration of participation: 45 minutes  Therapy conducted by: Psych rehab  Therapy Summary: Pt is attending RT sessions , social with peers , calm cooperative , mood is improving .  
  04-24-23  Type of therapy: Coping skills,Creative arts therapy,Self esteem,Stress management  Type of session: Individual  Level of patient participation: Participated with encouragement  Duration of participation: 30 minutes  Therapy conducted by: Psych rehab  Therapy Summary: Pt will need encouragement , pt usally enjoys art and table top games . Pt is social with peers .    04-24-23  Type of therapy: Coping skills,Stress management  Type of session: Group  Level of patient participation: Attentive,Engaged,Participates  Duration of participation: 45 minutes  Therapy conducted by: Social work  Therapy Summary: Patient attended the Patient Support Group with  and peers. The topic of “Building Happiness” was explored and exercises were reviewed known to encourage lasting happiness such as positive journaling, exercise, acts of kindness, meditation, gratitude’s and fostering relationships. Patients provided support and feedback while  facilitated the discussion.

## 2023-04-25 NOTE — BH INPATIENT PSYCHIATRY PROGRESS NOTE - PRN MEDS
MEDICATIONS  (PRN):  acetaminophen     Tablet .. 650 milliGRAM(s) Oral every 6 hours PRN Moderate Pain (4 - 6)  acetaminophen     Tablet .. 650 milliGRAM(s) Oral every 6 hours PRN Temp greater or equal to 38C (100.4F), Mild Pain (1 - 3)  albuterol    90 MICROgram(s) HFA Inhaler 1 Puff(s) Inhalation every 6 hours PRN Bronchospasm  aluminum hydroxide/magnesium hydroxide/simethicone Suspension 30 milliLiter(s) Oral every 6 hours PRN Dyspepsia  dextrose Oral Gel 15 Gram(s) Oral once PRN Blood Glucose LESS THAN 70 milliGRAM(s)/deciliter  diphenhydrAMINE Injectable 50 milliGRAM(s) IntraMuscular every 6 hours PRN Agitation  haloperidol    Injectable 5 milliGRAM(s) IntraMuscular every 6 hours PRN Agitation  LORazepam   Injectable 2 milliGRAM(s) IntraMuscular every 6 hours PRN Agitation  ondansetron   Disintegrating Tablet 4 milliGRAM(s) Oral three times a day PRN for nausea  
MEDICATIONS  (PRN):  acetaminophen     Tablet .. 650 milliGRAM(s) Oral every 6 hours PRN Moderate Pain (4 - 6)  acetaminophen     Tablet .. 650 milliGRAM(s) Oral every 6 hours PRN Temp greater or equal to 38C (100.4F), Mild Pain (1 - 3)  albuterol    90 MICROgram(s) HFA Inhaler 1 Puff(s) Inhalation every 6 hours PRN Bronchospasm  aluminum hydroxide/magnesium hydroxide/simethicone Suspension 30 milliLiter(s) Oral every 6 hours PRN Dyspepsia  dextrose Oral Gel 15 Gram(s) Oral once PRN Blood Glucose LESS THAN 70 milliGRAM(s)/deciliter  guaifenesin/dextromethorphan Oral Liquid 10 milliLiter(s) Oral every 6 hours PRN cough  hydrOXYzine hydrochloride 50 milliGRAM(s) Oral every 6 hours PRN anxiety and pseudoseizures  ibuprofen  Tablet. 400 milliGRAM(s) Oral every 6 hours PRN Mild Pain (1 - 3)  loratadine 10 milliGRAM(s) Oral daily PRN seasonal allergy  LORazepam     Tablet 2 milliGRAM(s) Oral every 6 hours PRN Agitaiton  ondansetron   Disintegrating Tablet 4 milliGRAM(s) Oral three times a day PRN for nausea  
MEDICATIONS  (PRN):  acetaminophen     Tablet .. 650 milliGRAM(s) Oral every 6 hours PRN Moderate Pain (4 - 6)  acetaminophen     Tablet .. 650 milliGRAM(s) Oral every 6 hours PRN Temp greater or equal to 38C (100.4F), Mild Pain (1 - 3)  albuterol    90 MICROgram(s) HFA Inhaler 1 Puff(s) Inhalation every 6 hours PRN Bronchospasm  aluminum hydroxide/magnesium hydroxide/simethicone Suspension 30 milliLiter(s) Oral every 6 hours PRN Dyspepsia  dextrose Oral Gel 15 Gram(s) Oral once PRN Blood Glucose LESS THAN 70 milliGRAM(s)/deciliter  diphenhydrAMINE Injectable 50 milliGRAM(s) IntraMuscular every 6 hours PRN Agitation  haloperidol    Injectable 5 milliGRAM(s) IntraMuscular every 6 hours PRN Agitation  hydrOXYzine hydrochloride 50 milliGRAM(s) Oral every 6 hours PRN anxiety and pseudoseizures  loratadine 10 milliGRAM(s) Oral daily PRN seasonal allergy  LORazepam   Injectable 2 milliGRAM(s) IntraMuscular every 6 hours PRN Agitation  ondansetron   Disintegrating Tablet 4 milliGRAM(s) Oral three times a day PRN for nausea  
MEDICATIONS  (PRN):  acetaminophen     Tablet .. 650 milliGRAM(s) Oral every 6 hours PRN Temp greater or equal to 38C (100.4F), Mild Pain (1 - 3)  acetaminophen     Tablet .. 650 milliGRAM(s) Oral every 6 hours PRN Moderate Pain (4 - 6)  albuterol    90 MICROgram(s) HFA Inhaler 1 Puff(s) Inhalation every 6 hours PRN Bronchospasm  aluminum hydroxide/magnesium hydroxide/simethicone Suspension 30 milliLiter(s) Oral every 6 hours PRN Dyspepsia  dextrose Oral Gel 15 Gram(s) Oral once PRN Blood Glucose LESS THAN 70 milliGRAM(s)/deciliter  guaifenesin/dextromethorphan Oral Liquid 10 milliLiter(s) Oral every 6 hours PRN cough  hydrOXYzine hydrochloride 50 milliGRAM(s) Oral every 6 hours PRN anxiety and pseudoseizures  ibuprofen  Tablet. 400 milliGRAM(s) Oral every 6 hours PRN Mild Pain (1 - 3)  loratadine 10 milliGRAM(s) Oral daily PRN seasonal allergy  LORazepam     Tablet 2 milliGRAM(s) Oral every 6 hours PRN Agitaiton  ondansetron   Disintegrating Tablet 4 milliGRAM(s) Oral three times a day PRN for nausea  
MEDICATIONS  (PRN):  acetaminophen     Tablet .. 650 milliGRAM(s) Oral every 6 hours PRN Moderate Pain (4 - 6)  acetaminophen     Tablet .. 650 milliGRAM(s) Oral every 6 hours PRN Temp greater or equal to 38C (100.4F), Mild Pain (1 - 3)  albuterol    90 MICROgram(s) HFA Inhaler 1 Puff(s) Inhalation every 6 hours PRN Bronchospasm  aluminum hydroxide/magnesium hydroxide/simethicone Suspension 30 milliLiter(s) Oral every 6 hours PRN Dyspepsia  dextrose Oral Gel 15 Gram(s) Oral once PRN Blood Glucose LESS THAN 70 milliGRAM(s)/deciliter  diphenhydrAMINE Injectable 50 milliGRAM(s) IntraMuscular every 6 hours PRN Agitation  haloperidol    Injectable 5 milliGRAM(s) IntraMuscular every 6 hours PRN Agitation  hydrOXYzine hydrochloride 50 milliGRAM(s) Oral every 6 hours PRN anxiety and pseudoseizures  loratadine 10 milliGRAM(s) Oral daily PRN seasonal allergy  LORazepam   Injectable 2 milliGRAM(s) IntraMuscular every 6 hours PRN Agitation  ondansetron   Disintegrating Tablet 4 milliGRAM(s) Oral three times a day PRN for nausea  
MEDICATIONS  (PRN):  acetaminophen     Tablet .. 650 milliGRAM(s) Oral every 6 hours PRN Temp greater or equal to 38C (100.4F), Mild Pain (1 - 3)  acetaminophen     Tablet .. 650 milliGRAM(s) Oral every 6 hours PRN Moderate Pain (4 - 6)  albuterol    90 MICROgram(s) HFA Inhaler 1 Puff(s) Inhalation every 6 hours PRN Bronchospasm  aluminum hydroxide/magnesium hydroxide/simethicone Suspension 30 milliLiter(s) Oral every 6 hours PRN Dyspepsia  dextrose Oral Gel 15 Gram(s) Oral once PRN Blood Glucose LESS THAN 70 milliGRAM(s)/deciliter  guaifenesin/dextromethorphan Oral Liquid 10 milliLiter(s) Oral every 6 hours PRN cough  hydrOXYzine hydrochloride 50 milliGRAM(s) Oral every 6 hours PRN anxiety and pseudoseizures  ibuprofen  Tablet. 400 milliGRAM(s) Oral every 6 hours PRN Mild Pain (1 - 3)  loratadine 10 milliGRAM(s) Oral daily PRN seasonal allergy  LORazepam     Tablet 2 milliGRAM(s) Oral every 6 hours PRN Agitaiton  ondansetron   Disintegrating Tablet 4 milliGRAM(s) Oral three times a day PRN for nausea  
MEDICATIONS  (PRN):  acetaminophen     Tablet .. 650 milliGRAM(s) Oral every 6 hours PRN Moderate Pain (4 - 6)  acetaminophen     Tablet .. 650 milliGRAM(s) Oral every 6 hours PRN Temp greater or equal to 38C (100.4F), Mild Pain (1 - 3)  albuterol    90 MICROgram(s) HFA Inhaler 1 Puff(s) Inhalation every 6 hours PRN Bronchospasm  aluminum hydroxide/magnesium hydroxide/simethicone Suspension 30 milliLiter(s) Oral every 6 hours PRN Dyspepsia  dextrose Oral Gel 15 Gram(s) Oral once PRN Blood Glucose LESS THAN 70 milliGRAM(s)/deciliter  guaifenesin/dextromethorphan Oral Liquid 10 milliLiter(s) Oral every 6 hours PRN cough  hydrOXYzine hydrochloride 50 milliGRAM(s) Oral every 6 hours PRN anxiety and pseudoseizures  ibuprofen  Tablet. 400 milliGRAM(s) Oral every 6 hours PRN Mild Pain (1 - 3)  loratadine 10 milliGRAM(s) Oral daily PRN seasonal allergy  LORazepam     Tablet 2 milliGRAM(s) Oral every 6 hours PRN Agitaiton  ondansetron   Disintegrating Tablet 4 milliGRAM(s) Oral three times a day PRN for nausea  
MEDICATIONS  (PRN):  acetaminophen     Tablet .. 650 milliGRAM(s) Oral every 6 hours PRN Moderate Pain (4 - 6)  acetaminophen     Tablet .. 650 milliGRAM(s) Oral every 6 hours PRN Temp greater or equal to 38C (100.4F), Mild Pain (1 - 3)  albuterol    90 MICROgram(s) HFA Inhaler 1 Puff(s) Inhalation every 6 hours PRN Bronchospasm  aluminum hydroxide/magnesium hydroxide/simethicone Suspension 30 milliLiter(s) Oral every 6 hours PRN Dyspepsia  dextrose Oral Gel 15 Gram(s) Oral once PRN Blood Glucose LESS THAN 70 milliGRAM(s)/deciliter  hydrOXYzine hydrochloride 50 milliGRAM(s) Oral every 6 hours PRN anxiety and pseudoseizures  ibuprofen  Tablet. 400 milliGRAM(s) Oral every 6 hours PRN Mild Pain (1 - 3)  loratadine 10 milliGRAM(s) Oral daily PRN seasonal allergy  LORazepam     Tablet 2 milliGRAM(s) Oral every 6 hours PRN Agitaiton  ondansetron   Disintegrating Tablet 4 milliGRAM(s) Oral three times a day PRN for nausea  
MEDICATIONS  (PRN):  acetaminophen     Tablet .. 650 milliGRAM(s) Oral every 6 hours PRN Moderate Pain (4 - 6)  acetaminophen     Tablet .. 650 milliGRAM(s) Oral every 6 hours PRN Temp greater or equal to 38C (100.4F), Mild Pain (1 - 3)  albuterol    90 MICROgram(s) HFA Inhaler 1 Puff(s) Inhalation every 6 hours PRN Bronchospasm  aluminum hydroxide/magnesium hydroxide/simethicone Suspension 30 milliLiter(s) Oral every 6 hours PRN Dyspepsia  dextrose Oral Gel 15 Gram(s) Oral once PRN Blood Glucose LESS THAN 70 milliGRAM(s)/deciliter  diphenhydrAMINE Injectable 50 milliGRAM(s) IntraMuscular every 6 hours PRN Agitation  haloperidol    Injectable 5 milliGRAM(s) IntraMuscular every 6 hours PRN Agitation  loratadine 10 milliGRAM(s) Oral daily PRN seasonal allergy  LORazepam   Injectable 2 milliGRAM(s) IntraMuscular every 6 hours PRN Agitation  ondansetron   Disintegrating Tablet 4 milliGRAM(s) Oral three times a day PRN for nausea  
MEDICATIONS  (PRN):  acetaminophen     Tablet .. 650 milliGRAM(s) Oral every 6 hours PRN Temp greater or equal to 38C (100.4F), Mild Pain (1 - 3)  acetaminophen     Tablet .. 650 milliGRAM(s) Oral every 6 hours PRN Moderate Pain (4 - 6)  albuterol    90 MICROgram(s) HFA Inhaler 1 Puff(s) Inhalation every 6 hours PRN Bronchospasm  aluminum hydroxide/magnesium hydroxide/simethicone Suspension 30 milliLiter(s) Oral every 6 hours PRN Dyspepsia  dextrose Oral Gel 15 Gram(s) Oral once PRN Blood Glucose LESS THAN 70 milliGRAM(s)/deciliter  diphenhydrAMINE Injectable 50 milliGRAM(s) IntraMuscular every 6 hours PRN Agitation  haloperidol    Injectable 5 milliGRAM(s) IntraMuscular every 6 hours PRN Agitation  LORazepam   Injectable 2 milliGRAM(s) IntraMuscular every 6 hours PRN Agitation  ondansetron   Disintegrating Tablet 4 milliGRAM(s) Oral three times a day PRN for nausea

## 2023-04-25 NOTE — BH INPATIENT PSYCHIATRY DISCHARGE NOTE - NSBHSUICIDESTATUS_PSY_ALL_CORE
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 Patient's modifiable risk factors include a recent romantic breakup. DBT performed on unit, spoke with patient about her interpersonal triggers, and patient was started on lamictal for emotional dysregulation. Patient to be discharged to partial hospitalization program for further stabilization.
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
## 2023-04-25 NOTE — BH INPATIENT PSYCHIATRY DISCHARGE NOTE - NSDCMRMEDTOKEN_GEN_ALL_CORE_FT
acetaminophen 500 mg oral tablet: 2 tab(s) orally every 8 hours  albuterol 90 mcg/inh inhalation powder: 1 puff(s) inhaled every 6 hours as needed for  bronchospasm  aluminum hydroxide-magnesium hydroxide 200 mg-200 mg/5 mL oral suspension: 30 milliliter(s) orally every 6 hours as needed for Dyspepsia  amitriptyline 75 mg oral tablet: 1 tab(s) orally once a day (at bedtime)  ferrous sulfate 325 mg (65 mg elemental iron) oral tablet: 1 tab(s) orally once a day  hydrOXYzine hydrochloride 50 mg oral tablet: 1 tab(s) orally every 6 hours as needed for anxiety/psychogenic seizures  ibuprofen 400 mg oral tablet: 1 tab(s) orally every 6 hours As needed Mild Pain (1 - 3)  Lexapro 20 mg oral tablet: 1 tab(s) orally once a day  melatonin 3 mg oral tablet: 1 tab(s) orally once a day (at bedtime)  metoprolol tartrate 25 mg oral tablet: 1 tab(s) orally 2 times a day  ondansetron 4 mg oral tablet, disintegratin tab(s) orally 3 times a day as needed for  nausea  pantoprazole 40 mg oral delayed release tablet: 1 tab(s) orally once a day (before a meal)   acetaminophen 500 mg oral tablet: 2 tab(s) orally every 8 hours  albuterol 90 mcg/inh inhalation powder: 1 puff(s) inhaled every 6 hours as needed for  bronchospasm  aluminum hydroxide-magnesium hydroxide 200 mg-200 mg/5 mL oral suspension: 30 milliliter(s) orally every 6 hours as needed for Dyspepsia  amitriptyline 75 mg oral tablet: 1 tab(s) orally once a day (at bedtime)  ferrous sulfate 325 mg (65 mg elemental iron) oral tablet: 1 tab(s) orally once a day  hydrOXYzine hydrochloride 50 mg oral tablet: 1 tab(s) orally every 6 hours as needed for anxiety and pseudoseizures  ibuprofen 400 mg oral tablet: 1 tab(s) orally every 6 hours As needed Mild Pain (1 - 3)  lamoTRIgine 25 mg oral tablet: 1 tab(s) orally once a day  Lexapro 20 mg oral tablet: 1 tab(s) orally once a day  loratadine 10 mg oral tablet: 1 tab(s) orally once a day as needed for seasonal allergy  metoprolol tartrate 25 mg oral tablet: 1 tab(s) orally 2 times a day  ondansetron 4 mg oral tablet, disintegratin tab(s) orally 3 times a day as needed for  nausea  pantoprazole 40 mg oral delayed release tablet: 1 tab(s) orally once a day (before a meal)

## 2023-04-25 NOTE — BH TREATMENT PLAN - NSPTSTATEDGOAL_PSY_ALL_CORE
to develop better coping skills when interpersonal relationships become difficult
"develop better coping skills"

## 2023-04-25 NOTE — BH INPATIENT PSYCHIATRY PROGRESS NOTE - NSDCCRITERIA_PSY_ALL_CORE
will compliant with medications   will not suicidal    will not have pseudoseizure 
 will compliant with medications   will not suicidal    will not have pseudoseizure 
When patient is able to safety plan

## 2023-04-25 NOTE — BH INPATIENT PSYCHIATRY PROGRESS NOTE - NSTXSUICIDDATEEST_PSY_ALL_CORE
15-Apr-2023

## 2023-04-25 NOTE — BH INPATIENT PSYCHIATRY PROGRESS NOTE - NSICDXBHPRIMARYDX_PSY_ALL_CORE
Generalized anxiety disorder   F41.1  
Depression   F32.A  
Generalized anxiety disorder   F41.1  

## 2023-04-25 NOTE — BH TREATMENT PLAN - NSTXCOPEINTERPR_PSY_ALL_CORE
Rt will encourage pts attendance in groups and offer support as needed .
Rt will offer support and ongoing encouragement .

## 2023-04-25 NOTE — BH INPATIENT PSYCHIATRY DISCHARGE NOTE - NSBHMETABOLIC_PSY_ALL_CORE_FT
BMI: BMI (kg/m2): 31.4 (04-15-23 @ 16:42)  HbA1c: A1C with Estimated Average Glucose Result: 9.2 % (04-07-23 @ 08:11)    Glucose: POCT Blood Glucose.: 216 mg/dL (04-25-23 @ 11:29)    BP: 123/71 (04-25-23 @ 08:57) (123/61 - 161/77)  Lipid Panel:

## 2023-04-25 NOTE — BH INPATIENT PSYCHIATRY PROGRESS NOTE - NSBHASSESSSUMMFT_PSY_ALL_CORE
37F who is domiciled at [facility] with mother, unemployed, single, with PMHx of T1DM, Diabetic neuropathy, HTN, chronic back pain, migraine headaches, PPHx of anxiety/depression, 5 or 6 prior lifetime psychiatric admissions for suicide attempts  (once by taking a capful of acetone, another by drinking hydrogen peroxide, another attempt by taking half a bottle of ibuprofen), recently discharged from [facility], presented for vague suicidal thoughts after romantic rejection.  04/17/23 Patient somewhat subdued but future oriented, eager to learn skills to help with feelings of rejection.   04/18/23 Patient had two pseudoseizures yesterday, voiced sedation on standing Atarax (will change to PRN), appears interested in trying DBT, will try making pro's and con's list for two of her problematic behaviors for tomorrow   04/19/2023  patient remains with thoughts of self-injury but they seem to be slightly diminished  04/20/23 patient reports low mood which may be related to feeling physically ill, RVP+, agreeable to start lamictal  04/21/23 patient has no side effects from lamictal, open to partial hospitalization after discharge and DBT program  04/24/23 patient continues to report no side effects from lamictal, still open to partial [facility] hospitalization program, anticipated DC on Wednesday 04/25/23 patient somnolent in the morning, glucose WNL, will re-evaluate, plan to DC tomorrow    Impression: Cluster B traits vs Anxiety vs Depression    #ARMAAN #Cluster B Traits #Depression  - Continue Lexapro 20 mg  - Continue lamictal 25 mg (started 04/20), increase to 50 mg on 05/04  - Treatment centered on creating a validating environment for patient, and increasing insight into the nature of her anxieties and interpersonal difficulties  - DBT worksheets  - Referral to DBT  - Referral to [facility] partial hospitalization program after discharge, confirm with [facility] that telehealth is possible    #Agitation  -For episodes of acute agitation can offer PO Haldol 5 mg/Ativan 2 mg/Benadryl 50 mg Q6H PRN. If refusing PO and is in acute risk of harm to self/other, can offer IM Haldol 5 mg/Ativan 2 mg/Benadryl 50 mg Q6H PRN. If given, please repeat EKG and hold antipsychotics if QTC>500     #URI  - RVP+, COVID negative  - Vitals WNL    #Diabetes   - DC insulin pump while on inpatient unit  - Lantus 35 at bedtime  - Lispro 10 TID  - Sliding scale insulin    #HTN  - Metoprolol 25 mg BID    #Anemia  - Ferrous sulfate    #Nausea  - PRN Odansetron

## 2023-04-25 NOTE — BH INPATIENT PSYCHIATRY PROGRESS NOTE - NSICDXBHSECONDARYDX_PSY_ALL_CORE
Cluster B personality disorder in adult   F60.9  ARMAAN (generalized anxiety disorder)   F41.1  
Cluster B personality disorder in adult   F60.9  ARMAAN (generalized anxiety disorder)   F41.1  
Cluster B personality disorder in adult   F60.9  
Cluster B personality disorder in adult   F60.9  ARMAAN (generalized anxiety disorder)   F41.1

## 2023-04-25 NOTE — BH INPATIENT PSYCHIATRY PROGRESS NOTE - NSTXSUICIDDATETRGT_PSY_ALL_CORE
20-Apr-2023
08-May-2023
20-Apr-2023
08-May-2023
20-Apr-2023
20-Apr-2023

## 2023-04-25 NOTE — BH INPATIENT PSYCHIATRY PROGRESS NOTE - NSBHMSEPERCEPT_PSY_A_CORE
No abnormalities
Unable to assess
No abnormalities
No abnormalities

## 2023-04-25 NOTE — BH INPATIENT PSYCHIATRY DISCHARGE NOTE - NSDCCPCAREPLAN_GEN_ALL_CORE_FT
Diagnosis: Lung cancer    Regimen: Opdivo  Cycle/Day: C17D1    Dr. Aakash Chan  is supervising clinician today.    ECOG:   ECOG [20 1435]   ECOG Performance Status 0       Review and verified Advanced Directives: No    Verified if patient has state DNR bracelet on: No; Full Code    Nursing Assessment:   A focused nursing assessment addressing the toxicity of chemotherapy was performed and the patient reports the following:  Nausea: NO  Vomiting: NO  Fever: NO  Chills: NO  Other signs of infection: NO  Bleeding: NO  Mucositis: NO  Diarrhea: NO  Constipation: NO  Anorexia: NO  Dysuria: NO  Urinary Bleeding: NO  Cough: NO  Shortness of Breath: NO  Fatigue/Weakness: YES  Numbness/Tingling: NO  Other Neuropathies: NO  Edema: NO  Rash: NO  Hand/Foot Syndrome: NO  Anxiety/Depression/Insomnia: NO  Pain: NO    Patient confirms that he has received a copy of Chemotherapy Consent and verbalizes understanding of treatment plan: Yes.     Pre-Treatment: - Treatment consent signed  - Patient has valid pre-authorization  - VS completed  - Height and weight verified  BSA independently double checked & verified by two practitioners  - Premed orders, including hydration, are verified prior to administration  - Treatment parameters verified in patient protocol  - Lab results checked - MD notified; Ok to treat per MD  - Chemotherapy doses independently doubled checked & verified by two practitioners  - Chemotherapy infusion pump settings are double checked & verified by two practitioners  - Patient is identified by first & last name, Date of birth that has been verified with the patient chairside.  - Patient is identified by first & last name, Date of birth that has been verified by two practitioners with the patient chairside.    Treatment: Refer to LDA and MAR for line assessment and medication administration  Chemotherapy has not ; double checked & verified by two practitioners  Appearance and physical integrity of drugs  meets standard of drug monograph; double checked & verified by two practitioners  Rate set on infusion pump is in alignment with ordered rate; double checked & verified by two practitioners  Blood return confirmed before, during and after treatment administered  Infusion pump used for non-vesicant drugs    Post Treatment: Treatment tolerated well; no adverse reaction    Does the Patient have a central line?  yes:   Device: Port  Central Line Site: Left and Chest  Central Line Site Appearance: clear  Implanted port accessed using a 20 gauge non-coring needle primed with 0.9% sodium chloride. Good blood return obtained.  Blood obtained and sent to lab.  Site Care: Aseptic site care per protocol, Sterile gauze and tape dressing applied and Injection caps changed/applied  Central line flushed with: 20 ml 0.9 normal saline and 100 un/ml- 500 units heparin  Central line removed: no  Alicea Needle: Alicea needle de-accessed      Transfusion: Not needed    Integrative Medicine: No    Oral Chemotherapy: No    Education: Reviewed side effects of Opdivo and sxs hypersensitivity    Next appointment scheduled: 10/5  Patient instructed to call the office with any questions or concerns.    Patient Discharged: patient discharged to home per self, ambulatory   PRINCIPAL DISCHARGE DIAGNOSIS  Diagnosis: Generalized anxiety disorder  Assessment and Plan of Treatment:       SECONDARY DISCHARGE DIAGNOSES  Diagnosis: Cluster B personality disorder in adult  Assessment and Plan of Treatment:

## 2023-04-25 NOTE — BH INPATIENT PSYCHIATRY PROGRESS NOTE - NSBHFUPINTERVALCCFT_PSY_A_CORE
"I don't feel too good"
"I am coming down with something"
"let me sleep"
" Im feeling better " 
"I'm feeling a bit better"
"Still have some sniffles"
"Do you think I have more anxiety or depression?
"still a little anxious"
" I  have pseudo seizure" 
"I have cold."

## 2023-04-25 NOTE — BH INPATIENT PSYCHIATRY PROGRESS NOTE - NSBHROSSYSTEMS_PSY_ALL_CORE
Psychiatric
Musculoskeletal.../Neurological.../Psychiatric
Psychiatric

## 2023-04-25 NOTE — BH INPATIENT PSYCHIATRY DISCHARGE NOTE - HOSPITAL COURSE
37F who is domiciled at [facility] with mother, unemployed, single, with PMHx of T1DM, Diabetic neuropathy, HTN, chronic back pain, migraine headaches, PPHx of anxiety/depression, 5 or 6 prior lifetime psychiatric admissions for suicide attempts  (once by taking a capful of acetone, another by drinking hydrogen peroxide, another attempt by taking half a bottle of ibuprofen), recently discharged from [facility], presented for vague suicidal thoughts after romantic rejection.    Throughout the course of the inpatient hospitalization the patient consistently denied suicidal ideation and thoughts of self harm after the first few days. She was started on Lamictal at 25 mg on 04/20, with the plan for it to be increased next to 50 mg on 05/04, for emotional dysregulation. She had no side effects, and explicitly no rashes. The patient was agreeable to getting discharged to [facility]'s partial hospitalization program, and to continuing DBT therapy via [facility]'s program after discharge.    Patient denies suicidal/homicidal ideation, intent, or plan. Patient denies symptoms of depression, alvina, anxiety, PTSD, or psychosis at this time.   Patient is stable and ready to be discharged.

## 2023-04-25 NOTE — BH INPATIENT PSYCHIATRY PROGRESS NOTE - NSBHATTESTBILLING_PSY_A_CORE
95789-Thlntlkoje OBS or IP - moderate complexity OR 35-49 mins
89985-Uocjqddbta OBS or IP - low complexity OR 25-34 mins
05166-Holnwwkhjz OBS or IP - low complexity OR 25-34 mins
40594-Oxyotztkln OBS or IP - moderate complexity OR 35-49 mins
35034-Rntozoibvp OBS or IP - low complexity OR 25-34 mins
23671-Jlvmzltoyt OBS or IP - low complexity OR 25-34 mins
53203-Gyhuhuqnjb OBS or IP - low complexity OR 25-34 mins
33211-Mkxvlvzfre OBS or IP - low complexity OR 25-34 mins
74313-Gtdcqrruav OBS or IP - low complexity OR 25-34 mins
29088-Vgcysjmmrc OBS or IP - low complexity OR 25-34 mins

## 2023-04-25 NOTE — BH INPATIENT PSYCHIATRY PROGRESS NOTE - NSBHPSYCHOLCOGORIENT_PSY_A_CORE
Oriented to time, place, person, situation
Unable to assess
Oriented to time, place, person, situation

## 2023-04-25 NOTE — BH INPATIENT PSYCHIATRY PROGRESS NOTE - NSBHMSESPEECH_PSY_A_CORE
Normal volume, rate, productivity, spontaneity and articulation
Non-verbal: unable to assess speech further
Normal volume, rate, productivity, spontaneity and articulation

## 2023-04-25 NOTE — BH INPATIENT PSYCHIATRY PROGRESS NOTE - NSBHATTESTCOMMENTATTENDFT_PSY_A_CORE
Clinical aspects of case discussed with resident. I concur with findings and plan.
Discussed clinical aspects of case with resident. I concur with findings and plan.
Interviewed patient with the resident Dr. Guo. As per nurses, patient is with some attention-seeking behavior. Patient seen in her room, reports feeling down and depressed, not much different from yesterday. She denies si, denies side effects of medications. Agree with resident's assessment and plan.
Discussed clinical aspects of case with resident. I concur with findings and plan.

## 2023-04-25 NOTE — BH INPATIENT PSYCHIATRY PROGRESS NOTE - NSBHCHARTREVIEWVS_PSY_A_CORE FT
Vital Signs Last 24 Hrs  T(C): 35.8 (04-23-23 @ 08:01), Max: 35.8 (04-23-23 @ 08:01)  T(F): 96.4 (04-23-23 @ 08:01), Max: 96.4 (04-23-23 @ 08:01)  HR: 108 (04-23-23 @ 08:01) (108 - 108)  BP: 137/73 (04-23-23 @ 08:01) (137/73 - 137/73)  BP(mean): --  RR: 18 (04-23-23 @ 08:01) (18 - 18)  SpO2: --    
Vital Signs Last 24 Hrs  T(C): 35.8 (04-24-23 @ 16:22), Max: 35.8 (04-24-23 @ 16:22)  T(F): 96.4 (04-24-23 @ 16:22), Max: 96.4 (04-24-23 @ 16:22)  HR: 98 (04-25-23 @ 08:57) (94 - 107)  BP: 123/71 (04-25-23 @ 08:57) (123/61 - 141/86)  BP(mean): --  RR: 18 (04-25-23 @ 08:57) (16 - 18)  SpO2: 100% (04-24-23 @ 19:38) (100% - 100%)    
Vital Signs Last 24 Hrs  T(C): 35.7 (04-21-23 @ 08:05), Max: 35.7 (04-21-23 @ 08:05)  T(F): 96.2 (04-21-23 @ 08:05), Max: 96.2 (04-21-23 @ 08:05)  HR: 105 (04-21-23 @ 08:05) (98 - 105)  BP: 134/80 (04-21-23 @ 08:05) (125/80 - 134/80)  BP(mean): --  RR: 18 (04-21-23 @ 08:05) (16 - 18)  SpO2: --    
Vital Signs Last 24 Hrs  T(C): 35.5 (04-17-23 @ 16:08), Max: 35.5 (04-17-23 @ 16:08)  T(F): 95.9 (04-17-23 @ 16:08), Max: 95.9 (04-17-23 @ 16:08)  HR: 93 (04-18-23 @ 09:38) (93 - 105)  BP: 128/78 (04-18-23 @ 09:38) (103/78 - 128/78)  BP(mean): --  RR: 18 (04-18-23 @ 09:38) (16 - 18)  SpO2: --    
Vital Signs Last 24 Hrs  T(C): 36.1 (04-17-23 @ 08:20), Max: 36.1 (04-17-23 @ 08:20)  T(F): 96.9 (04-17-23 @ 08:20), Max: 96.9 (04-17-23 @ 08:20)  HR: 90 (04-17-23 @ 08:20) (90 - 105)  BP: 118/71 (04-17-23 @ 08:20) (107/55 - 138/71)  BP(mean): --  RR: 18 (04-17-23 @ 08:20) (18 - 18)  SpO2: --    
Vital Signs Last 24 Hrs  T(C): 35.8 (04-23-23 @ 15:48), Max: 35.8 (04-23-23 @ 15:48)  T(F): 96.5 (04-23-23 @ 15:48), Max: 96.5 (04-23-23 @ 15:48)  HR: 114 (04-23-23 @ 15:48) (114 - 114)  BP: 161/77 (04-23-23 @ 15:48) (161/77 - 161/77)  BP(mean): --  RR: 16 (04-23-23 @ 15:48) (16 - 16)  SpO2: --    
Vital Signs Last 24 Hrs  T(C): 36.4 (04-16-23 @ 08:38), Max: 36.8 (04-15-23 @ 15:30)  T(F): 97.6 (04-16-23 @ 08:38), Max: 98.2 (04-15-23 @ 15:30)  HR: 101 (04-16-23 @ 08:38) (90 - 101)  BP: 96/51 (04-16-23 @ 08:38) (96/51 - 160/78)  BP(mean): --  RR: 18 (04-16-23 @ 08:38) (18 - 18)  SpO2: 99% (04-15-23 @ 15:30) (99% - 99%)    
Vital Signs Last 24 Hrs  T(C): 36.4 (04-19-23 @ 07:15), Max: 36.4 (04-19-23 @ 07:15)  T(F): 97.6 (04-19-23 @ 07:15), Max: 97.6 (04-19-23 @ 07:15)  HR: 105 (04-19-23 @ 15:38) (105 - 112)  BP: 112/80 (04-19-23 @ 15:38) (112/80 - 147/77)  BP(mean): --  RR: 16 (04-19-23 @ 15:38) (16 - 18)  SpO2: 99% (04-19-23 @ 07:15) (99% - 99%)    
Vital Signs Last 24 Hrs  T(C): 36 (04-20-23 @ 09:01), Max: 36 (04-20-23 @ 09:01)  T(F): 96.8 (04-20-23 @ 09:01), Max: 96.8 (04-20-23 @ 09:01)  HR: 128 (04-20-23 @ 09:01) (105 - 128)  BP: 126/70 (04-20-23 @ 09:01) (112/80 - 126/70)  BP(mean): --  RR: 18 (04-20-23 @ 09:01) (16 - 18)  SpO2: --    
Vital Signs Last 24 Hrs  T(C): 36.4 (04-21-23 @ 15:48), Max: 36.4 (04-21-23 @ 15:48)  T(F): 97.6 (04-21-23 @ 15:48), Max: 97.6 (04-21-23 @ 15:48)  HR: 96 (04-22-23 @ 07:32) (96 - 112)  BP: 134/69 (04-22-23 @ 07:32) (127/85 - 134/69)  BP(mean): --  RR: 16 (04-22-23 @ 07:32) (16 - 16)  SpO2: --

## 2023-04-25 NOTE — BH TREATMENT PLAN - NSDCCRITERIA_PSY_ALL_CORE
will compliant with medications   will not suicidal    will not have pseudoseizure 
When patient is able to safety plan

## 2023-04-25 NOTE — BH INPATIENT PSYCHIATRY PROGRESS NOTE - NSBHMSEGAIT_PSY_A_CORE
Normal gait / station
Other
Unable to assess
Normal gait / station
Other
Normal gait / station
Unable to assess

## 2023-04-25 NOTE — BH INPATIENT PSYCHIATRY PROGRESS NOTE - CURRENT MEDICATION
MEDICATIONS  (STANDING):  amitriptyline 75 milliGRAM(s) Oral daily  dextrose 5%. 1000 milliLiter(s) (100 mL/Hr) IV Continuous <Continuous>  dextrose 5%. 1000 milliLiter(s) (50 mL/Hr) IV Continuous <Continuous>  dextrose 50% Injectable 25 Gram(s) IV Push once  dextrose 50% Injectable 12.5 Gram(s) IV Push once  dextrose 50% Injectable 25 Gram(s) IV Push once  escitalopram 20 milliGRAM(s) Oral daily  ferrous    sulfate 325 milliGRAM(s) Oral daily  fluticasone propionate 50 MICROgram(s)/spray Nasal Spray 1 Spray(s) Both Nostrils two times a day  glucagon  Injectable 1 milliGRAM(s) IntraMuscular once  insulin glargine Injectable (LANTUS) 35 Unit(s) SubCutaneous at bedtime  insulin lispro (ADMELOG) corrective regimen sliding scale   SubCutaneous three times a day before meals  insulin lispro Injectable (ADMELOG) 10 Unit(s) SubCutaneous three times a day before meals  metoprolol tartrate 25 milliGRAM(s) Oral two times a day  pantoprazole    Tablet 40 milliGRAM(s) Oral before breakfast    MEDICATIONS  (PRN):  acetaminophen     Tablet .. 650 milliGRAM(s) Oral every 6 hours PRN Moderate Pain (4 - 6)  acetaminophen     Tablet .. 650 milliGRAM(s) Oral every 6 hours PRN Temp greater or equal to 38C (100.4F), Mild Pain (1 - 3)  albuterol    90 MICROgram(s) HFA Inhaler 1 Puff(s) Inhalation every 6 hours PRN Bronchospasm  aluminum hydroxide/magnesium hydroxide/simethicone Suspension 30 milliLiter(s) Oral every 6 hours PRN Dyspepsia  dextrose Oral Gel 15 Gram(s) Oral once PRN Blood Glucose LESS THAN 70 milliGRAM(s)/deciliter  guaifenesin/dextromethorphan Oral Liquid 10 milliLiter(s) Oral every 6 hours PRN cough  hydrOXYzine hydrochloride 50 milliGRAM(s) Oral every 6 hours PRN anxiety and pseudoseizures  ibuprofen  Tablet. 400 milliGRAM(s) Oral every 6 hours PRN Mild Pain (1 - 3)  loratadine 10 milliGRAM(s) Oral daily PRN seasonal allergy  LORazepam     Tablet 2 milliGRAM(s) Oral every 6 hours PRN Agitaiton  ondansetron   Disintegrating Tablet 4 milliGRAM(s) Oral three times a day PRN for nausea  
MEDICATIONS  (STANDING):  amitriptyline 75 milliGRAM(s) Oral daily  dextrose 5%. 1000 milliLiter(s) (50 mL/Hr) IV Continuous <Continuous>  dextrose 5%. 1000 milliLiter(s) (100 mL/Hr) IV Continuous <Continuous>  dextrose 50% Injectable 25 Gram(s) IV Push once  dextrose 50% Injectable 25 Gram(s) IV Push once  dextrose 50% Injectable 12.5 Gram(s) IV Push once  escitalopram 20 milliGRAM(s) Oral daily  ferrous    sulfate 325 milliGRAM(s) Oral daily  glucagon  Injectable 1 milliGRAM(s) IntraMuscular once  hydrOXYzine hydrochloride 50 milliGRAM(s) Oral every 6 hours  insulin glargine Injectable (LANTUS) 30 Unit(s) SubCutaneous at bedtime  insulin lispro (ADMELOG) corrective regimen sliding scale   SubCutaneous three times a day before meals  insulin lispro Injectable (ADMELOG) 8 Unit(s) SubCutaneous three times a day before meals  melatonin 3 milliGRAM(s) Oral at bedtime  metoprolol tartrate 25 milliGRAM(s) Oral two times a day  pantoprazole    Tablet 40 milliGRAM(s) Oral before breakfast    MEDICATIONS  (PRN):  acetaminophen     Tablet .. 650 milliGRAM(s) Oral every 6 hours PRN Temp greater or equal to 38C (100.4F), Mild Pain (1 - 3)  acetaminophen     Tablet .. 650 milliGRAM(s) Oral every 6 hours PRN Moderate Pain (4 - 6)  albuterol    90 MICROgram(s) HFA Inhaler 1 Puff(s) Inhalation every 6 hours PRN Bronchospasm  aluminum hydroxide/magnesium hydroxide/simethicone Suspension 30 milliLiter(s) Oral every 6 hours PRN Dyspepsia  dextrose Oral Gel 15 Gram(s) Oral once PRN Blood Glucose LESS THAN 70 milliGRAM(s)/deciliter  diphenhydrAMINE Injectable 50 milliGRAM(s) IntraMuscular every 6 hours PRN Agitation  haloperidol    Injectable 5 milliGRAM(s) IntraMuscular every 6 hours PRN Agitation  LORazepam   Injectable 2 milliGRAM(s) IntraMuscular every 6 hours PRN Agitation  ondansetron   Disintegrating Tablet 4 milliGRAM(s) Oral three times a day PRN for nausea  
MEDICATIONS  (STANDING):  amitriptyline 75 milliGRAM(s) Oral daily  dextrose 5%. 1000 milliLiter(s) (50 mL/Hr) IV Continuous <Continuous>  dextrose 5%. 1000 milliLiter(s) (100 mL/Hr) IV Continuous <Continuous>  dextrose 50% Injectable 25 Gram(s) IV Push once  dextrose 50% Injectable 25 Gram(s) IV Push once  dextrose 50% Injectable 12.5 Gram(s) IV Push once  escitalopram 20 milliGRAM(s) Oral daily  ferrous    sulfate 325 milliGRAM(s) Oral daily  glucagon  Injectable 1 milliGRAM(s) IntraMuscular once  insulin glargine Injectable (LANTUS) 35 Unit(s) SubCutaneous at bedtime  insulin lispro (ADMELOG) corrective regimen sliding scale   SubCutaneous three times a day before meals  insulin lispro Injectable (ADMELOG) 10 Unit(s) SubCutaneous three times a day before meals  lamoTRIgine 25 milliGRAM(s) Oral daily  metoprolol tartrate 25 milliGRAM(s) Oral two times a day  pantoprazole    Tablet 40 milliGRAM(s) Oral before breakfast    MEDICATIONS  (PRN):  acetaminophen     Tablet .. 650 milliGRAM(s) Oral every 6 hours PRN Temp greater or equal to 38C (100.4F), Mild Pain (1 - 3)  acetaminophen     Tablet .. 650 milliGRAM(s) Oral every 6 hours PRN Moderate Pain (4 - 6)  albuterol    90 MICROgram(s) HFA Inhaler 1 Puff(s) Inhalation every 6 hours PRN Bronchospasm  aluminum hydroxide/magnesium hydroxide/simethicone Suspension 30 milliLiter(s) Oral every 6 hours PRN Dyspepsia  dextrose Oral Gel 15 Gram(s) Oral once PRN Blood Glucose LESS THAN 70 milliGRAM(s)/deciliter  guaifenesin/dextromethorphan Oral Liquid 10 milliLiter(s) Oral every 6 hours PRN cough  hydrOXYzine hydrochloride 50 milliGRAM(s) Oral every 6 hours PRN anxiety and pseudoseizures  ibuprofen  Tablet. 400 milliGRAM(s) Oral every 6 hours PRN Mild Pain (1 - 3)  loratadine 10 milliGRAM(s) Oral daily PRN seasonal allergy  LORazepam     Tablet 2 milliGRAM(s) Oral every 6 hours PRN Agitaiton  ondansetron   Disintegrating Tablet 4 milliGRAM(s) Oral three times a day PRN for nausea  
MEDICATIONS  (STANDING):  amitriptyline 75 milliGRAM(s) Oral daily  dextrose 5%. 1000 milliLiter(s) (50 mL/Hr) IV Continuous <Continuous>  dextrose 5%. 1000 milliLiter(s) (100 mL/Hr) IV Continuous <Continuous>  dextrose 50% Injectable 25 Gram(s) IV Push once  dextrose 50% Injectable 12.5 Gram(s) IV Push once  dextrose 50% Injectable 25 Gram(s) IV Push once  escitalopram 20 milliGRAM(s) Oral daily  ferrous    sulfate 325 milliGRAM(s) Oral daily  glucagon  Injectable 1 milliGRAM(s) IntraMuscular once  hydrOXYzine hydrochloride 50 milliGRAM(s) Oral every 6 hours  insulin glargine Injectable (LANTUS) 30 Unit(s) SubCutaneous at bedtime  insulin lispro (ADMELOG) corrective regimen sliding scale   SubCutaneous three times a day before meals  insulin lispro Injectable (ADMELOG) 8 Unit(s) SubCutaneous three times a day before meals  metoprolol tartrate 25 milliGRAM(s) Oral two times a day  pantoprazole    Tablet 40 milliGRAM(s) Oral before breakfast    MEDICATIONS  (PRN):  acetaminophen     Tablet .. 650 milliGRAM(s) Oral every 6 hours PRN Moderate Pain (4 - 6)  acetaminophen     Tablet .. 650 milliGRAM(s) Oral every 6 hours PRN Temp greater or equal to 38C (100.4F), Mild Pain (1 - 3)  albuterol    90 MICROgram(s) HFA Inhaler 1 Puff(s) Inhalation every 6 hours PRN Bronchospasm  aluminum hydroxide/magnesium hydroxide/simethicone Suspension 30 milliLiter(s) Oral every 6 hours PRN Dyspepsia  dextrose Oral Gel 15 Gram(s) Oral once PRN Blood Glucose LESS THAN 70 milliGRAM(s)/deciliter  diphenhydrAMINE Injectable 50 milliGRAM(s) IntraMuscular every 6 hours PRN Agitation  haloperidol    Injectable 5 milliGRAM(s) IntraMuscular every 6 hours PRN Agitation  loratadine 10 milliGRAM(s) Oral daily PRN seasonal allergy  LORazepam   Injectable 2 milliGRAM(s) IntraMuscular every 6 hours PRN Agitation  ondansetron   Disintegrating Tablet 4 milliGRAM(s) Oral three times a day PRN for nausea  
MEDICATIONS  (STANDING):  amitriptyline 75 milliGRAM(s) Oral daily  dextrose 5%. 1000 milliLiter(s) (50 mL/Hr) IV Continuous <Continuous>  dextrose 5%. 1000 milliLiter(s) (100 mL/Hr) IV Continuous <Continuous>  dextrose 50% Injectable 25 Gram(s) IV Push once  dextrose 50% Injectable 12.5 Gram(s) IV Push once  dextrose 50% Injectable 25 Gram(s) IV Push once  escitalopram 20 milliGRAM(s) Oral daily  ferrous    sulfate 325 milliGRAM(s) Oral daily  glucagon  Injectable 1 milliGRAM(s) IntraMuscular once  insulin glargine Injectable (LANTUS) 35 Unit(s) SubCutaneous at bedtime  insulin lispro (ADMELOG) corrective regimen sliding scale   SubCutaneous three times a day before meals  insulin lispro Injectable (ADMELOG) 10 Unit(s) SubCutaneous three times a day before meals  lamoTRIgine 25 milliGRAM(s) Oral daily  metoprolol tartrate 25 milliGRAM(s) Oral two times a day  pantoprazole    Tablet 40 milliGRAM(s) Oral before breakfast    MEDICATIONS  (PRN):  acetaminophen     Tablet .. 650 milliGRAM(s) Oral every 6 hours PRN Moderate Pain (4 - 6)  acetaminophen     Tablet .. 650 milliGRAM(s) Oral every 6 hours PRN Temp greater or equal to 38C (100.4F), Mild Pain (1 - 3)  albuterol    90 MICROgram(s) HFA Inhaler 1 Puff(s) Inhalation every 6 hours PRN Bronchospasm  aluminum hydroxide/magnesium hydroxide/simethicone Suspension 30 milliLiter(s) Oral every 6 hours PRN Dyspepsia  dextrose Oral Gel 15 Gram(s) Oral once PRN Blood Glucose LESS THAN 70 milliGRAM(s)/deciliter  hydrOXYzine hydrochloride 50 milliGRAM(s) Oral every 6 hours PRN anxiety and pseudoseizures  ibuprofen  Tablet. 400 milliGRAM(s) Oral every 6 hours PRN Mild Pain (1 - 3)  loratadine 10 milliGRAM(s) Oral daily PRN seasonal allergy  LORazepam     Tablet 2 milliGRAM(s) Oral every 6 hours PRN Agitaiton  ondansetron   Disintegrating Tablet 4 milliGRAM(s) Oral three times a day PRN for nausea  
MEDICATIONS  (STANDING):  amitriptyline 75 milliGRAM(s) Oral daily  dextrose 5%. 1000 milliLiter(s) (50 mL/Hr) IV Continuous <Continuous>  dextrose 5%. 1000 milliLiter(s) (100 mL/Hr) IV Continuous <Continuous>  dextrose 50% Injectable 25 Gram(s) IV Push once  dextrose 50% Injectable 12.5 Gram(s) IV Push once  dextrose 50% Injectable 25 Gram(s) IV Push once  escitalopram 20 milliGRAM(s) Oral daily  ferrous    sulfate 325 milliGRAM(s) Oral daily  glucagon  Injectable 1 milliGRAM(s) IntraMuscular once  insulin glargine Injectable (LANTUS) 35 Unit(s) SubCutaneous at bedtime  insulin lispro (ADMELOG) corrective regimen sliding scale   SubCutaneous three times a day before meals  insulin lispro Injectable (ADMELOG) 10 Unit(s) SubCutaneous three times a day before meals  lamoTRIgine 25 milliGRAM(s) Oral daily  metoprolol tartrate 25 milliGRAM(s) Oral two times a day  pantoprazole    Tablet 40 milliGRAM(s) Oral before breakfast    MEDICATIONS  (PRN):  acetaminophen     Tablet .. 650 milliGRAM(s) Oral every 6 hours PRN Temp greater or equal to 38C (100.4F), Mild Pain (1 - 3)  acetaminophen     Tablet .. 650 milliGRAM(s) Oral every 6 hours PRN Moderate Pain (4 - 6)  albuterol    90 MICROgram(s) HFA Inhaler 1 Puff(s) Inhalation every 6 hours PRN Bronchospasm  aluminum hydroxide/magnesium hydroxide/simethicone Suspension 30 milliLiter(s) Oral every 6 hours PRN Dyspepsia  dextrose Oral Gel 15 Gram(s) Oral once PRN Blood Glucose LESS THAN 70 milliGRAM(s)/deciliter  guaifenesin/dextromethorphan Oral Liquid 10 milliLiter(s) Oral every 6 hours PRN cough  hydrOXYzine hydrochloride 50 milliGRAM(s) Oral every 6 hours PRN anxiety and pseudoseizures  ibuprofen  Tablet. 400 milliGRAM(s) Oral every 6 hours PRN Mild Pain (1 - 3)  loratadine 10 milliGRAM(s) Oral daily PRN seasonal allergy  LORazepam     Tablet 2 milliGRAM(s) Oral every 6 hours PRN Agitaiton  ondansetron   Disintegrating Tablet 4 milliGRAM(s) Oral three times a day PRN for nausea  
MEDICATIONS  (STANDING):  amitriptyline 75 milliGRAM(s) Oral daily  dextrose 5%. 1000 milliLiter(s) (100 mL/Hr) IV Continuous <Continuous>  dextrose 5%. 1000 milliLiter(s) (50 mL/Hr) IV Continuous <Continuous>  dextrose 50% Injectable 25 Gram(s) IV Push once  dextrose 50% Injectable 12.5 Gram(s) IV Push once  dextrose 50% Injectable 25 Gram(s) IV Push once  escitalopram 20 milliGRAM(s) Oral daily  ferrous    sulfate 325 milliGRAM(s) Oral daily  glucagon  Injectable 1 milliGRAM(s) IntraMuscular once  insulin glargine Injectable (LANTUS) 35 Unit(s) SubCutaneous at bedtime  insulin lispro (ADMELOG) corrective regimen sliding scale   SubCutaneous three times a day before meals  insulin lispro Injectable (ADMELOG) 10 Unit(s) SubCutaneous three times a day before meals  metoprolol tartrate 25 milliGRAM(s) Oral two times a day  pantoprazole    Tablet 40 milliGRAM(s) Oral before breakfast    MEDICATIONS  (PRN):  acetaminophen     Tablet .. 650 milliGRAM(s) Oral every 6 hours PRN Moderate Pain (4 - 6)  acetaminophen     Tablet .. 650 milliGRAM(s) Oral every 6 hours PRN Temp greater or equal to 38C (100.4F), Mild Pain (1 - 3)  albuterol    90 MICROgram(s) HFA Inhaler 1 Puff(s) Inhalation every 6 hours PRN Bronchospasm  aluminum hydroxide/magnesium hydroxide/simethicone Suspension 30 milliLiter(s) Oral every 6 hours PRN Dyspepsia  dextrose Oral Gel 15 Gram(s) Oral once PRN Blood Glucose LESS THAN 70 milliGRAM(s)/deciliter  diphenhydrAMINE Injectable 50 milliGRAM(s) IntraMuscular every 6 hours PRN Agitation  haloperidol    Injectable 5 milliGRAM(s) IntraMuscular every 6 hours PRN Agitation  hydrOXYzine hydrochloride 50 milliGRAM(s) Oral every 6 hours PRN anxiety and pseudoseizures  loratadine 10 milliGRAM(s) Oral daily PRN seasonal allergy  LORazepam   Injectable 2 milliGRAM(s) IntraMuscular every 6 hours PRN Agitation  ondansetron   Disintegrating Tablet 4 milliGRAM(s) Oral three times a day PRN for nausea  
MEDICATIONS  (STANDING):  amitriptyline 75 milliGRAM(s) Oral daily  dextrose 5%. 1000 milliLiter(s) (50 mL/Hr) IV Continuous <Continuous>  dextrose 5%. 1000 milliLiter(s) (100 mL/Hr) IV Continuous <Continuous>  dextrose 50% Injectable 12.5 Gram(s) IV Push once  dextrose 50% Injectable 25 Gram(s) IV Push once  dextrose 50% Injectable 25 Gram(s) IV Push once  escitalopram 20 milliGRAM(s) Oral daily  ferrous    sulfate 325 milliGRAM(s) Oral daily  glucagon  Injectable 1 milliGRAM(s) IntraMuscular once  hydrOXYzine hydrochloride 50 milliGRAM(s) Oral every 6 hours  insulin glargine Injectable (LANTUS) 30 Unit(s) SubCutaneous at bedtime  insulin lispro (ADMELOG) corrective regimen sliding scale   SubCutaneous three times a day before meals  insulin lispro Injectable (ADMELOG) 8 Unit(s) SubCutaneous three times a day before meals  melatonin 3 milliGRAM(s) Oral at bedtime  metoprolol tartrate 25 milliGRAM(s) Oral two times a day  pantoprazole    Tablet 40 milliGRAM(s) Oral before breakfast    MEDICATIONS  (PRN):  acetaminophen     Tablet .. 650 milliGRAM(s) Oral every 6 hours PRN Moderate Pain (4 - 6)  acetaminophen     Tablet .. 650 milliGRAM(s) Oral every 6 hours PRN Temp greater or equal to 38C (100.4F), Mild Pain (1 - 3)  albuterol    90 MICROgram(s) HFA Inhaler 1 Puff(s) Inhalation every 6 hours PRN Bronchospasm  aluminum hydroxide/magnesium hydroxide/simethicone Suspension 30 milliLiter(s) Oral every 6 hours PRN Dyspepsia  dextrose Oral Gel 15 Gram(s) Oral once PRN Blood Glucose LESS THAN 70 milliGRAM(s)/deciliter  diphenhydrAMINE Injectable 50 milliGRAM(s) IntraMuscular every 6 hours PRN Agitation  haloperidol    Injectable 5 milliGRAM(s) IntraMuscular every 6 hours PRN Agitation  LORazepam   Injectable 2 milliGRAM(s) IntraMuscular every 6 hours PRN Agitation  ondansetron   Disintegrating Tablet 4 milliGRAM(s) Oral three times a day PRN for nausea  
MEDICATIONS  (STANDING):  amitriptyline 75 milliGRAM(s) Oral daily  dextrose 5%. 1000 milliLiter(s) (100 mL/Hr) IV Continuous <Continuous>  dextrose 5%. 1000 milliLiter(s) (50 mL/Hr) IV Continuous <Continuous>  dextrose 50% Injectable 25 Gram(s) IV Push once  dextrose 50% Injectable 12.5 Gram(s) IV Push once  dextrose 50% Injectable 25 Gram(s) IV Push once  escitalopram 20 milliGRAM(s) Oral daily  ferrous    sulfate 325 milliGRAM(s) Oral daily  fluticasone propionate 50 MICROgram(s)/spray Nasal Spray 1 Spray(s) Both Nostrils two times a day  glucagon  Injectable 1 milliGRAM(s) IntraMuscular once  insulin glargine Injectable (LANTUS) 35 Unit(s) SubCutaneous at bedtime  insulin lispro (ADMELOG) corrective regimen sliding scale   SubCutaneous three times a day before meals  insulin lispro Injectable (ADMELOG) 10 Unit(s) SubCutaneous three times a day before meals  lamoTRIgine 25 milliGRAM(s) Oral daily  metoprolol tartrate 25 milliGRAM(s) Oral two times a day  pantoprazole    Tablet 40 milliGRAM(s) Oral before breakfast    MEDICATIONS  (PRN):  acetaminophen     Tablet .. 650 milliGRAM(s) Oral every 6 hours PRN Moderate Pain (4 - 6)  acetaminophen     Tablet .. 650 milliGRAM(s) Oral every 6 hours PRN Temp greater or equal to 38C (100.4F), Mild Pain (1 - 3)  albuterol    90 MICROgram(s) HFA Inhaler 1 Puff(s) Inhalation every 6 hours PRN Bronchospasm  aluminum hydroxide/magnesium hydroxide/simethicone Suspension 30 milliLiter(s) Oral every 6 hours PRN Dyspepsia  dextrose Oral Gel 15 Gram(s) Oral once PRN Blood Glucose LESS THAN 70 milliGRAM(s)/deciliter  guaifenesin/dextromethorphan Oral Liquid 10 milliLiter(s) Oral every 6 hours PRN cough  hydrOXYzine hydrochloride 50 milliGRAM(s) Oral every 6 hours PRN anxiety and pseudoseizures  ibuprofen  Tablet. 400 milliGRAM(s) Oral every 6 hours PRN Mild Pain (1 - 3)  loratadine 10 milliGRAM(s) Oral daily PRN seasonal allergy  LORazepam     Tablet 2 milliGRAM(s) Oral every 6 hours PRN Agitaiton  ondansetron   Disintegrating Tablet 4 milliGRAM(s) Oral three times a day PRN for nausea  
MEDICATIONS  (STANDING):  amitriptyline 75 milliGRAM(s) Oral daily  dextrose 5%. 1000 milliLiter(s) (50 mL/Hr) IV Continuous <Continuous>  dextrose 5%. 1000 milliLiter(s) (100 mL/Hr) IV Continuous <Continuous>  dextrose 50% Injectable 25 Gram(s) IV Push once  dextrose 50% Injectable 12.5 Gram(s) IV Push once  dextrose 50% Injectable 25 Gram(s) IV Push once  escitalopram 20 milliGRAM(s) Oral daily  ferrous    sulfate 325 milliGRAM(s) Oral daily  glucagon  Injectable 1 milliGRAM(s) IntraMuscular once  insulin glargine Injectable (LANTUS) 35 Unit(s) SubCutaneous at bedtime  insulin lispro (ADMELOG) corrective regimen sliding scale   SubCutaneous three times a day before meals  insulin lispro Injectable (ADMELOG) 10 Unit(s) SubCutaneous three times a day before meals  lamoTRIgine 25 milliGRAM(s) Oral daily  metoprolol tartrate 25 milliGRAM(s) Oral two times a day  pantoprazole    Tablet 40 milliGRAM(s) Oral before breakfast    MEDICATIONS  (PRN):  acetaminophen     Tablet .. 650 milliGRAM(s) Oral every 6 hours PRN Moderate Pain (4 - 6)  acetaminophen     Tablet .. 650 milliGRAM(s) Oral every 6 hours PRN Temp greater or equal to 38C (100.4F), Mild Pain (1 - 3)  albuterol    90 MICROgram(s) HFA Inhaler 1 Puff(s) Inhalation every 6 hours PRN Bronchospasm  aluminum hydroxide/magnesium hydroxide/simethicone Suspension 30 milliLiter(s) Oral every 6 hours PRN Dyspepsia  dextrose Oral Gel 15 Gram(s) Oral once PRN Blood Glucose LESS THAN 70 milliGRAM(s)/deciliter  diphenhydrAMINE Injectable 50 milliGRAM(s) IntraMuscular every 6 hours PRN Agitation  haloperidol    Injectable 5 milliGRAM(s) IntraMuscular every 6 hours PRN Agitation  hydrOXYzine hydrochloride 50 milliGRAM(s) Oral every 6 hours PRN anxiety and pseudoseizures  loratadine 10 milliGRAM(s) Oral daily PRN seasonal allergy  LORazepam   Injectable 2 milliGRAM(s) IntraMuscular every 6 hours PRN Agitation  ondansetron   Disintegrating Tablet 4 milliGRAM(s) Oral three times a day PRN for nausea

## 2023-04-25 NOTE — BH INPATIENT PSYCHIATRY PROGRESS NOTE - NSTXSUICIDINTERMD_PSY_ALL_CORE
DBT, consider augmentation to antidepressant

## 2023-04-25 NOTE — BH INPATIENT PSYCHIATRY DISCHARGE NOTE - NSBHDCMEDICALFT_PSY_A_CORE
#URI  - RVP+, COVID negative  - Vitals WNL    #Diabetes   - DC insulin pump while on inpatient unit  - Lantus 35 at bedtime  - Lispro 10 TID  - Sliding scale insulin    #HTN  - Metoprolol 25 mg BID    #Anemia  - Ferrous sulfate    #Nausea  - PRN Odansetron

## 2023-04-25 NOTE — BH TREATMENT PLAN - NSTXSUICIDINTERRN_PSY_ALL_CORE
pt will attend groups and express feelings
RN to assess patients behavior and be alert for increased signs of impulsivity/agitation.  RN will monitor patient and provide support and comfort and provedie safety on unit.    RN to encourage medication compliance and provide support and education as needed on Dx, coping skills, medication, and safety planning.  RN to Encourage daily ADL's  RN to Encourage group attendance  RN will assess and intervene for any suicidal ideations

## 2023-04-26 ENCOUNTER — NON-APPOINTMENT (OUTPATIENT)
Age: 38
End: 2023-04-26

## 2023-04-26 VITALS — DIASTOLIC BLOOD PRESSURE: 87 MMHG | SYSTOLIC BLOOD PRESSURE: 157 MMHG | RESPIRATION RATE: 16 BRPM | HEART RATE: 98 BPM

## 2023-04-26 LAB
GLUCOSE BLDC GLUCOMTR-MCNC: 205 MG/DL — HIGH (ref 70–99)
GLUCOSE BLDC GLUCOMTR-MCNC: 322 MG/DL — HIGH (ref 70–99)

## 2023-04-26 RX ORDER — ESCITALOPRAM OXALATE 10 MG/1
1 TABLET, FILM COATED ORAL
Qty: 14 | Refills: 1
Start: 2023-04-26 | End: 2023-05-23

## 2023-04-26 RX ORDER — HYDROXYZINE HCL 10 MG
1 TABLET ORAL
Qty: 56 | Refills: 0
Start: 2023-04-26 | End: 2023-05-09

## 2023-04-26 RX ORDER — AMITRIPTYLINE HCL 25 MG
1 TABLET ORAL
Qty: 14 | Refills: 0
Start: 2023-04-26 | End: 2023-05-09

## 2023-04-26 RX ORDER — ONDANSETRON 8 MG/1
1 TABLET, FILM COATED ORAL
Qty: 6 | Refills: 0
Start: 2023-04-26 | End: 2023-05-09

## 2023-04-26 RX ORDER — METOPROLOL TARTRATE 50 MG
1 TABLET ORAL
Qty: 28 | Refills: 0
Start: 2023-04-26 | End: 2023-05-09

## 2023-04-26 RX ORDER — LAMOTRIGINE 25 MG/1
1 TABLET, ORALLY DISINTEGRATING ORAL
Qty: 14 | Refills: 0
Start: 2023-04-26 | End: 2023-05-09

## 2023-04-26 RX ORDER — FERROUS SULFATE 325(65) MG
1 TABLET ORAL
Qty: 14 | Refills: 0
Start: 2023-04-26 | End: 2023-05-09

## 2023-04-26 RX ORDER — IBUPROFEN 200 MG
1 TABLET ORAL
Qty: 56 | Refills: 0
Start: 2023-04-26 | End: 2023-05-09

## 2023-04-26 RX ORDER — LORATADINE 10 MG/1
1 TABLET ORAL
Qty: 14 | Refills: 0
Start: 2023-04-26 | End: 2023-05-09

## 2023-04-26 RX ORDER — PANTOPRAZOLE SODIUM 20 MG/1
1 TABLET, DELAYED RELEASE ORAL
Qty: 14 | Refills: 0
Start: 2023-04-26 | End: 2023-05-09

## 2023-04-26 RX ADMIN — ESCITALOPRAM OXALATE 20 MILLIGRAM(S): 10 TABLET, FILM COATED ORAL at 08:11

## 2023-04-26 RX ADMIN — Medication 25 MILLIGRAM(S): at 08:11

## 2023-04-26 RX ADMIN — Medication 325 MILLIGRAM(S): at 08:11

## 2023-04-26 RX ADMIN — LAMOTRIGINE 25 MILLIGRAM(S): 25 TABLET, ORALLY DISINTEGRATING ORAL at 08:11

## 2023-04-26 RX ADMIN — Medication 75 MILLIGRAM(S): at 08:12

## 2023-04-26 RX ADMIN — PANTOPRAZOLE SODIUM 40 MILLIGRAM(S): 20 TABLET, DELAYED RELEASE ORAL at 06:34

## 2023-04-26 RX ADMIN — Medication 8: at 07:33

## 2023-04-26 RX ADMIN — Medication 10 UNIT(S): at 07:34

## 2023-04-26 NOTE — BH DISCHARGE NOTE NURSING/SOCIAL WORK/PSYCH REHAB - PATIENT PORTAL LINK FT
You can access the FollowMyHealth Patient Portal offered by [facility] by registering at the following website: [email or web address]. By joining [employer]’s FollowMyHealth portal, you will also be able to view your health information using other applications (apps) compatible with our system.

## 2023-04-26 NOTE — BH DISCHARGE NOTE NURSING/SOCIAL WORK/PSYCH REHAB - NSCDUDCCRISIS_PSY_A_CORE
[facility]  1 [phone] Community HealthWELL [phone])  Text "WELL" to [phone]  Website: [email or web address].EDAN/.National Suicide Prevention Lifeline 3 (133) 695-8311/.  [employer]  1 (220) LIFENET (616-5268)/988 Suicide and Crisis Lifeline

## 2023-04-27 ENCOUNTER — NON-APPOINTMENT (OUTPATIENT)
Age: 38
End: 2023-04-27

## 2023-04-28 ENCOUNTER — NON-APPOINTMENT (OUTPATIENT)
Age: 38
End: 2023-04-28

## 2023-04-30 DIAGNOSIS — F41.1 GENERALIZED ANXIETY DISORDER: ICD-10-CM

## 2023-04-30 DIAGNOSIS — F32.A DEPRESSION, UNSPECIFIED: ICD-10-CM

## 2023-04-30 DIAGNOSIS — R45.851 SUICIDAL IDEATIONS: ICD-10-CM

## 2023-04-30 DIAGNOSIS — G89.29 OTHER CHRONIC PAIN: ICD-10-CM

## 2023-04-30 DIAGNOSIS — I10 ESSENTIAL (PRIMARY) HYPERTENSION: ICD-10-CM

## 2023-04-30 DIAGNOSIS — Z88.8 ALLERGY STATUS TO OTHER DRUGS, MEDICAMENTS AND BIOLOGICAL SUBSTANCES: ICD-10-CM

## 2023-04-30 DIAGNOSIS — Z88.0 ALLERGY STATUS TO PENICILLIN: ICD-10-CM

## 2023-04-30 DIAGNOSIS — Z20.822 CONTACT WITH AND (SUSPECTED) EXPOSURE TO COVID-19: ICD-10-CM

## 2023-04-30 DIAGNOSIS — Z88.1 ALLERGY STATUS TO OTHER ANTIBIOTIC AGENTS STATUS: ICD-10-CM

## 2023-04-30 DIAGNOSIS — E10.40 TYPE 1 DIABETES MELLITUS WITH DIABETIC NEUROPATHY, UNSPECIFIED: ICD-10-CM

## 2023-04-30 DIAGNOSIS — M54.9 DORSALGIA, UNSPECIFIED: ICD-10-CM

## 2023-04-30 DIAGNOSIS — J06.9 ACUTE UPPER RESPIRATORY INFECTION, UNSPECIFIED: ICD-10-CM

## 2023-04-30 DIAGNOSIS — F60.9 PERSONALITY DISORDER, UNSPECIFIED: ICD-10-CM

## 2023-04-30 DIAGNOSIS — Z79.4 LONG TERM (CURRENT) USE OF INSULIN: ICD-10-CM

## 2023-04-30 DIAGNOSIS — Z96.41 PRESENCE OF INSULIN PUMP (EXTERNAL) (INTERNAL): ICD-10-CM

## 2023-04-30 DIAGNOSIS — R45.1 RESTLESSNESS AND AGITATION: ICD-10-CM

## 2023-04-30 DIAGNOSIS — D64.9 ANEMIA, UNSPECIFIED: ICD-10-CM

## 2023-05-01 ENCOUNTER — NON-APPOINTMENT (OUTPATIENT)
Age: 38
End: 2023-05-01

## 2023-05-01 RX ORDER — CLONAZEPAM 0.5 MG/1
0.5 TABLET ORAL
Qty: 30 | Refills: 0 | Status: DISCONTINUED | COMMUNITY
Start: 2022-12-05 | End: 2023-05-01

## 2023-05-03 ENCOUNTER — NON-APPOINTMENT (OUTPATIENT)
Age: 38
End: 2023-05-03

## 2023-05-03 ENCOUNTER — APPOINTMENT (OUTPATIENT)
Dept: PSYCHIATRY | Facility: CLINIC | Age: 38
End: 2023-05-03

## 2023-05-03 ENCOUNTER — OUTPATIENT (OUTPATIENT)
Dept: OUTPATIENT SERVICES | Facility: HOSPITAL | Age: 38
LOS: 1 days | End: 2023-05-03
Payer: MEDICAID

## 2023-05-03 DIAGNOSIS — F33.1 MAJOR DEPRESSIVE DISORDER, RECURRENT, MODERATE: ICD-10-CM

## 2023-05-03 DIAGNOSIS — F41.1 GENERALIZED ANXIETY DISORDER: ICD-10-CM

## 2023-05-03 DIAGNOSIS — F60.3 BORDERLINE PERSONALITY DISORDER: ICD-10-CM

## 2023-05-03 PROCEDURE — 90834 PSYTX W PT 45 MINUTES: CPT

## 2023-05-03 PROCEDURE — 99213 OFFICE O/P EST LOW 20 MIN: CPT | Mod: 25

## 2023-05-03 RX ORDER — CLONAZEPAM 0.25 MG/1
0.25 TABLET, ORALLY DISINTEGRATING ORAL EVERY MORNING
Qty: 14 | Refills: 0 | Status: DISCONTINUED | COMMUNITY
Start: 2023-02-14 | End: 2023-05-03

## 2023-05-03 NOTE — PHYSICAL EXAM
[Cooperative] : cooperative [Euthymic] : euthymic [Full] : full [Clear] : clear [Linear/Goal Directed] : linear/goal directed [WNL] : within normal limits [Average] : average [FreeTextEntry2] : uses a walker  [de-identified] : Fair [de-identified] : Fair

## 2023-05-03 NOTE — RISK ASSESSMENT
[No, patient denies ideation or behavior] : No, patient denies ideation or behavior [Low acute suicide risk] : Low acute suicide risk [Yes] : Safety Plan completed/updated (for individuals at risk): Yes

## 2023-05-03 NOTE — PAST MEDICAL HISTORY
[FreeTextEntry1] : fatty liver \par DM - type 1 on insulin pump \par DM neuropathy\par migraine HA

## 2023-05-03 NOTE — REASON FOR VISIT
[Patient with collateral] : Patient with collateral  [Mother] : mother [Post-Hospitalization Visit] : This is a post-hospitalization visit [Follow-Up Visit] : a follow-up visit

## 2023-05-03 NOTE — SOCIAL HISTORY
[Assisted Living Facility] : lives in an assisted living facility [Disabled] : disabled [Never ] : never  [High School] : high school [None] : none [No Known Substance Use] : no known substance use [Yes] : yes [No Known Use] : no known use [FreeTextEntry1] : [facility] for the past 2.5yrs [TextBox_52] : Prescribed

## 2023-05-03 NOTE — DISCUSSION/SUMMARY
[FreeTextEntry1] : Ms. Ania Mei is a 36yo single  female., domiciled at [facility], [facility] DM type 1, DM neuropathy, fatty liver, migraine HA, pseudoseizures, pph ARMAAN, cluster B, 5 prior IPP admissions most recenlty 4/2023, 4 prior SA (drinking capeful of acetone, drinking hydrogen peroxide, taking 1/2 bottle ibuprofen, and drinking pinesol/bleach).   \par Ania most recently  reported severe severe stressors sine beginning of 2020 , including homelessness , changes in living situation, stress over the covid pandemic with losses within the family including his father on 11/28/22.  Ania reported increased of anxiety with episodes of falling, generalized shakiness, head pressure and inability to move, patients EEG and video EEG were reportedly negative for any seizure activity. \par \par Patient presenting after recent hospitalization for suicidal ideation and recently disclosed suicidal gesture  (occurred in november 2022) w/small amount of  bleach and pine sol. Although today pt continues to report chronic SI, she denies intent or plan. She is able to safety plan with provider and remains future oriented. She reports improvement in frequency of suicidal thoughts and has been utilizing coping skills learned on the inpatient unit. She continues to struggle with mood deregulation. Will continue to optimize lamotrigine. Will also lower dose and frequency of hydroxyzine as pt reports feeling overly sedated. Risks, benefits, and alteratives of treatment options reviewed as well as importance of compliance with chosen options. Patient demonstrated an understanding of above and is amenable with plan.\par \par Pt is not an imminent risk to self. She remains at a chronically elevated risks due to history of previous suicidal gestures and attempts, history of hospitalization, mood deregulation, hx of impulsivity. This however is mitigated by future talk, no current SI w/ intent or plan, identifies reasons for living, engaged in outpatient treatment, living in closely monitored facility. Furthermore, pt chronic risk will not be mitigated by inpatient hospitalization. She is not an imminent risk to self/other and outpatient level of care remains appropriate. \par  \par Safety plan discussed at lengths w/ pt/mother. Pt reports that she will call 911 or go to the nearest Emergency room should she become an imminent risk to self/other. Pt will also utilize suicide hotline number provided. \par \par increase lamictal 25mg to 50mg \par Escitalopram 20mg po daily\par lower hydroxyzine 50mg to 25mg po BID\par refer to distress tolerance groups  \par RTC 1 week \par

## 2023-05-03 NOTE — HISTORY OF PRESENT ILLNESS
[No] : no [FreeTextEntry1] : \par  [FreeTextEntry2] : five inpatient admissions, most recently 4/2023 for SI, had also reported SA by drinking bleach/pinsole after father's death in 11/2022; prior to this patient has history of 3 remote SA in her teens (approx. 16/16 yo, reports taking capeful acetone, drinking hydrogen peroxide, and half bottle of ibuprofen)\par multiple failed medication trials \par multiple medical ED visits for pseudoseizures and other medical complaints \par  [FreeTextEntry3] : celexa\par cymbalta (not effective)\par zyprexa\par buspar (not effective)\par gabapentin (hives)\par melatonin  [de-identified] : \par

## 2023-05-04 DIAGNOSIS — F41.1 GENERALIZED ANXIETY DISORDER: ICD-10-CM

## 2023-05-04 DIAGNOSIS — F60.3 BORDERLINE PERSONALITY DISORDER: ICD-10-CM

## 2023-05-04 NOTE — PLAN
[[facility]] : [facility]  [Supportive Therapy] : Supportive Therapy [Recommended Frequency of Visits: ____] : Recommended frequency of visits: [unfilled] [Return in ____ week(s)] : Return in [unfilled] week(s) [FreeTextEntry2] : Goal #1 " I want to feel better, and need help managing my anxiety and stress" \par \par Objective #1  Continued...  Patient will learn assertive communication and be able to set boundaries and limits.  \par Objective #2 Continued... Patient will learn and utilize positive coping, bereavement, stress management, and CBT/DBT methods. \par \par   Goal #2  " I want to improve the quality of my life" \par Obj#1 Revised...  Patient will comply with all medical and psychiatric treatment recommendations" \par Obj#2  Revised ... Patient will participate in DBT skills group, other support groups and/or Programs. \par  \par  [de-identified] : Patient did not want to come to clinic or have her session with therapist or psychiatrist since she was feeling anger and stating that treatment is not helping.  She was arguing with her mother in the waiting room and mother was asked to wait downstairs. Patient was initially refusing to talk but with encouragement from therapist and psychiatrist she agreed to come to conference room fro her session. Patient reports that she started having suicide thoughts after her father's death. She reports that a few months ago she acted on impulse and drank a cap size amount of bleach and [place] Sole after arguing with her mother, but then regretting it after she vomited. She reports that she never told anyone about this. She reports that she still gets passive suicide thoughts that can be fleeting or persistent  but not daily. She denies any intent to act upon them. Spoke about stressors that continue to impact her mood and anxiety such as conflictual relationship with male friend at her residence and multiple medical and physical issues. She reports that she called [facility] on the day she was supposed to have her intake and was told that [facility] was not appropriate for her since she was not still not feeling well mentally and physically, and was told to go to the ED. Revised patient's treatment goals and plan.  Provided education on her mental health diagnosis including cluster personality traits, and recommended treatment, including CBT and DBT methods, and DBT skills group. Patient reports joining groups when she was in IPP and used some methods that were helpful to help her regulate her mood and deal with stress such as the STOP method. She reports other methods that are helpful are taking a shower and certain scents. Encouraged patient consider other supports or creating new positive relationships through psychosocial clubs or  Recreational centers.  Spoke about health insurance Care Coordinator, Cortney Ramos arranging case management services for patient. Patient was agreeable to these services. She complained of medication causing her to feel sedated during the day. Dr. Levin will adjust her medication today.  [FreeTextEntry1] : Patient will focus on positive coping, stress management, CBT, and DBT methods. She will comply with adjusted medication regimen, followup with psychiatrist on 5/9, and followup with therapy on 5/15.

## 2023-05-04 NOTE — DISCUSSION/SUMMARY
[1. Helpful Person/Contact Number: _____] : 1. Helpful Person/Contact Number: [unfilled] [2. Helpful Person/Contact Number: _____] : 2. Helpful Person/Contact Number: [unfilled] [a. Clinician Name/Contact Information: _____] : Clinician Name/Contact Information: [unfilled] [d. Suicide Prevention Lifeline Phone: [phone] ([phone] ] : Suicide Prevention Lifeline Phone: [phone] ([phone]  [h. Additional Resources:] : Additional Resources: [de-identified] : To use relaxation and coping methods such as "STOP" method.  [de-identified] : My mother  [Plan Review] : Plan Review [Adherent to treatment recommendations] : adherent to treatment recommendations [Articulate] : articulate [Cognitively intact] : cognitively intact [Motivated to participate in treatment] : motivated to participate in treatment [Part of a supportive family] : part of a supportive family [Housing stability] : housing stability [English fluency] : English fluency [Connected to healthcare] : connected to healthcare [FreeTextEntry2] : 5/1/24 [FreeTextEntry3] : 3/1/22 [FreeTextEntry7] : Patient has limited family support from only her mother, and limited social support from peers at her Adult Home residence.  [FreeTextEntry8] : None  [FreeTextEntry9] : None  [de-identified] : Mother  [Interpersonal Relationships] : Interpersonal Relationships [Other: ____] : [unfilled] [Continued - Progress made] : Continued - Progress made: [Mental Health] : Mental Health [Physical Health] : Physical Health [Revised - Rationale] : Revised - Rationale: [every ___ months] : every [unfilled] months [every ___ weeks] : every [unfilled] weeks [A change in level of care is needed as evidenced by:] : A change in level of care is needed as evidenced by: [None - Reason others did not participate:] : None - Reason others did not participate:  [Yes] : Yes [Psychiatric Provider/Prescriber] : Psychiatric Provider/Prescriber [Therapist] : Therapist [FreeTextEntry1] : Patient continues to have anxiety symptoms, panic attacks, poor frustration tolerance, poor impulse,  difficulty regulating her emotions, and difficulty       managing stress. She was in the IPP unit due to suicide thoughts which she said started after her father  at the end of last November.  [de-identified] : Patient has been making some efforts to set limits and boundaries.  [de-identified] : Patient has  sometimes practices or uses positive coping methods but still has problems regulating her emotions and dealing with conflict or stress. She reports acting on impulse a few months ago by drinking a cap size of bleach and alcohol triggered by conflict with mother and other  stress.  [FreeTextEntry4] : " I want to improve the quality of my life"  [de-identified] : Patient was in the IPP unit due to suicide ideation. Patient has difficulty regulating her emotions,becomes very somatic, has multiple health issues, and continues to have problems with self-care.  [de-identified] : Patient will comply with all medical and psychiatric treatment recommendations.  [de-identified] : 5/1/24 [de-identified] : Patient will participate in DBT skills group,other support groups and/or Programs.   [de-identified] : 5/1/24 [FreeTextEntry5] : [place] [facility]  [de-identified] : Depending on patients needs  [de-identified] : Patient is unable to preform activities of daily living and/or patient becomes suicidal.  [de-identified] : Clinically not indicated

## 2023-05-04 NOTE — DISCUSSION/SUMMARY
[1. Helpful Person/Contact Number: _____] : 1. Helpful Person/Contact Number: [unfilled] [2. Helpful Person/Contact Number: _____] : 2. Helpful Person/Contact Number: [unfilled] [a. Clinician Name/Contact Information: _____] : Clinician Name/Contact Information: [unfilled] [d. Suicide Prevention Lifeline Phone: [phone] ] : Suicide Prevention Lifeline Phone: [phone] ([phone])  [h. Additional Resources:] : Additional Resources: [de-identified] : To use relaxation and coping methods such as "STOP" method.  [de-identified] : My mother  [Plan Review] : Plan Review [Adherent to treatment recommendations] : adherent to treatment recommendations [Articulate] : articulate [Cognitively intact] : cognitively intact [Motivated to participate in treatment] : motivated to participate in treatment [Part of a supportive family] : part of a supportive family [Housing stability] : housing stability [English fluency] : English fluency [Connected to healthcare] : connected to healthcare [FreeTextEntry2] : 5/1/24 [FreeTextEntry3] : 3/1/22 [FreeTextEntry7] : Patient has limited family support from only her mother, and limited social support from peers at her Adult Home residence.  [FreeTextEntry8] : None  [FreeTextEntry9] : None  [de-identified] : Mother  [Interpersonal Relationships] : Interpersonal Relationships [Other: ____] : [unfilled] [Continued - Progress made] : Continued - Progress made: [Mental Health] : Mental Health [Physical Health] : Physical Health [Revised - Rationale] : Revised - Rationale: [every ___ months] : every [unfilled] months [every ___ weeks] : every [unfilled] weeks [A change in level of care is needed as evidenced by:] : A change in level of care is needed as evidenced by: [None - Reason others did not participate:] : None - Reason others did not participate:  [Yes] : Yes [Psychiatric Provider/Prescriber] : Psychiatric Provider/Prescriber [Therapist] : Therapist [FreeTextEntry1] : Patient continues to have anxiety symptoms, panic attacks, poor frustration tolerance, poor impulse,  difficulty regulating her emotions, and difficulty       managing stress. She was in the IPP unit due to suicide thoughts which she said started after her father  at the end of last November.  [de-identified] : Patient has been making some efforts to set limits and boundaries.  [de-identified] : Patient has  sometimes practices or uses positive coping methods but still has problems regulating her emotions and dealing with conflict or stress. She reports acting on impulse a few months ago by drinking a cap size of bleach and alcohol triggered by conflict with mother and other  stress.  [FreeTextEntry4] : " I want to improve the quality of my life"  [de-identified] : Patient was in the IPP unit due to suicide ideation. Patient has difficulty regulating her emotions,becomes very somatic, has multiple health issues, and continues to have problems with self-care.  [de-identified] : Patient will comply with all medical and psychiatric treatment recommendations.  [de-identified] : 5/1/24 [de-identified] : Patient will participate in DBT skills group,other support groups and/or Programs.   [de-identified] : 5/1/24 [FreeTextEntry5] : [place] [facility] Individual Therapy  [de-identified] : Depending on patients needs  [de-identified] : Patient is unable to preform activities of daily living and/or patient becomes suicidal.  [de-identified] : Clinically not indicated

## 2023-05-04 NOTE — RISK ASSESSMENT
[Yes, patient reports ideation or behavior] : Yes, patient reports ideation or behavior [No, patient denies ideation or behavior] : No, patient denies ideation or behavior [Yes] : Yes [No] : No [(3) 2-5 times per week] : 2 to 5 times per week [(4) 4-8 hours/most of day] : 4 to 8 hours/most of day [(3) Can control thoughts with some difficulty] : Can control thoughts with some difficulty [(1) Deterrents definitely stopped you from attempting suicide] : Deterrents definitely stopped you from attempting suicide [(2) Mostly to get attention, revenge or a reaction from others] : Mostly to get attention, revenge or a reaction from others [Cluster B Personality disorder/traits] : cluster B personality disorder/traits [Severe anxiety, agitation or panic] : severe anxiety, agitation or panic [Recent inpatient discharge] : recent inpatient discharge [Triggering events leading to humiliation, shame, and/or despair] : triggering events leading to humiliation, shame, and/or despair (e.g. loss of relationship, financial or health status) (real or anticipated) [Chronic pain/other acute medical condition] : chronic pain or other acute medical condition [Inadequate social supports] : inadequate social supports [Identifies reasons for living] : identifies reasons for living [Supportive social network of family or friends] : supportive social network of family or friends [Low acute suicide risk] : Low acute suicide risk [Unable to determine] : Unable to determine [FreeTextEntry8] : Patient reports that she has been having thoughts of not wanting to live and passive suicide thoughts about every other day. She reports any intent of acting upon these thoughts.  [TextBox_32] : Patient reports that she drank a cap size amount of bleach and pine sole a few months ago in reaction to an argument  with her mother. She reports that she was not planning to end her life or do this. She reports that she acted on impulse. She reports that she then regretted it.  [FreeTextEntry1] : 12 [FreeTextEntry2] : Patient wants to life a better quality of life. She is aware of psychiatrist on call, suicide hotline, and 911 which she can call if needed. She will also ask for help from her resident staff.

## 2023-05-04 NOTE — REASON FOR VISIT
[Patient] : Patient [Other: _____] : [unfilled] [Post-Hospitalization Visit] : This is a post-hospitalization visit [TextBox_17] : Dr. Jena Levin  [FreeTextEntry1] : Patient is a 37 year old female seen for a followup therapy session, 11:30 AM to 12:15 PM

## 2023-05-04 NOTE — PHYSICAL EXAM
[Cooperative] : cooperative [Anxious] : anxious [Irritable] : irritable [Labile] : labile [Clear] : clear [Linear/Goal Directed] : linear/goal directed [Average] : average [WNL] : within normal limits [FreeTextEntry8] : Patient was tearful before session started

## 2023-05-05 NOTE — ED PROVIDER NOTE - MDM ORDERS SUBMITTED SELECTION
Imiquimod Pregnancy And Lactation Text: This medication is Pregnancy Category C. It is unknown if this medication is excreted in breast milk. Imaging Studies/Medications

## 2023-05-08 ENCOUNTER — OUTPATIENT (OUTPATIENT)
Dept: OUTPATIENT SERVICES | Facility: HOSPITAL | Age: 38
LOS: 1 days | End: 2023-05-08
Payer: MEDICAID

## 2023-05-08 ENCOUNTER — APPOINTMENT (OUTPATIENT)
Dept: PODIATRY | Facility: CLINIC | Age: 38
End: 2023-05-08
Payer: MEDICAID

## 2023-05-08 ENCOUNTER — APPOINTMENT (OUTPATIENT)
Dept: PODIATRY | Facility: CLINIC | Age: 38
End: 2023-05-08

## 2023-05-08 DIAGNOSIS — E11.9 TYPE 2 DIABETES MELLITUS WITHOUT COMPLICATIONS: ICD-10-CM

## 2023-05-08 DIAGNOSIS — B35.1 TINEA UNGUIUM: ICD-10-CM

## 2023-05-08 DIAGNOSIS — M79.671 PAIN IN RIGHT FOOT: ICD-10-CM

## 2023-05-08 DIAGNOSIS — Z00.00 ENCOUNTER FOR GENERAL ADULT MEDICAL EXAMINATION WITHOUT ABNORMAL FINDINGS: ICD-10-CM

## 2023-05-08 DIAGNOSIS — M79.672 PAIN IN LEFT FOOT: ICD-10-CM

## 2023-05-08 PROCEDURE — 99213 OFFICE O/P EST LOW 20 MIN: CPT

## 2023-05-08 NOTE — PHYSICAL EXAM
[Foot Ulcer] : no foot ulcer [FreeTextEntry1] : No abnormality [General Appearance - Alert] : alert [General Appearance - In No Acute Distress] : in no acute distress [Abnormal Walk] : normal gait [Nail Clubbing] : no clubbing  or cyanosis of the fingernails [Involuntary Movements] : no involuntary movements were seen [Motor Tone] : muscle strength and tone were normal [Skin Color & Pigmentation] : normal skin color and pigmentation [] : no rash [Right Foot Was Examined] : The right foot was examined [Left Foot Was Examined] : The left foot was examined [Normal Appearance] : normal in appearance [Delayed in the Right Toes] : normal in the toes [Normal in Appearance] : normal in appearance [Full ROM] : with limited range of motion [Tenderness] : tender [Delayed in the Left Toes] : normal in the toes [1+] : 1+ in the dorsalis pedis [Deep Tendon Reflexes (DTR)] : deep tendon reflexes were 2+ and symmetric [Sensation] : the sensory exam was normal to light touch and pinprick [No Focal Deficits] : no focal deficits [Oriented To Time, Place, And Person] : oriented to person, place, and time [Impaired Insight] : insight and judgment were intact [Affect] : the affect was normal

## 2023-05-08 NOTE — ASSESSMENT
[FreeTextEntry1] : Patient examined, history and chart reviewed\par Patient educated on diabetic foot health and maintenance at length \par Complete Diabetic foot examination performed, control glucose\par Follow up in 6 months or PRN\par \par \par \par

## 2023-05-11 ENCOUNTER — OUTPATIENT (OUTPATIENT)
Dept: OUTPATIENT SERVICES | Facility: HOSPITAL | Age: 38
LOS: 1 days | End: 2023-05-11
Payer: MEDICAID

## 2023-05-11 ENCOUNTER — APPOINTMENT (OUTPATIENT)
Dept: PSYCHIATRY | Facility: CLINIC | Age: 38
End: 2023-05-11

## 2023-05-11 DIAGNOSIS — F33.1 MAJOR DEPRESSIVE DISORDER, RECURRENT, MODERATE: ICD-10-CM

## 2023-05-11 PROCEDURE — 99213 OFFICE O/P EST LOW 20 MIN: CPT

## 2023-05-11 NOTE — PHYSICAL EXAM
[FreeTextEntry2] : uses a walker  [Cooperative] : cooperative [Euthymic] : euthymic [Full] : full [Clear] : clear [Linear/Goal Directed] : linear/goal directed [WNL] : within normal limits [Average] : average [de-identified] : Fair [de-identified] : Fair

## 2023-05-11 NOTE — SOCIAL HISTORY
[Assisted Living Facility] : lives in an assisted living facility [Disabled] : disabled [Never ] : never  [High School] : high school [None] : none [FreeTextEntry1] : [facility] for the past 2.5yrs [No Known Substance Use] : no known substance use [Yes] : yes [No Known Use] : no known use [TextBox_52] : Prescribed

## 2023-05-11 NOTE — HISTORY OF PRESENT ILLNESS
[FreeTextEntry1] : \par  [FreeTextEntry2] : five inpatient admissions, most recently 4/2023 for SI, had also reported SA by drinking bleach/pinsole after father's death in 11/2022; prior to this patient has history of 3 remote SA in her teens (approx. 16/18 yo, reports taking capeful acetone, drinking hydrogen peroxide, and half bottle of ibuprofen)\par multiple failed medication trials \par multiple medical ED visits for pseudoseizures and other medical complaints \par  [FreeTextEntry3] : celexa\par cymbalta (not effective)\par zyprexa\par buspar (not effective)\par gabapentin (hives)\par melatonin  [No] : no [de-identified] : \par

## 2023-05-11 NOTE — DISCUSSION/SUMMARY
[FreeTextEntry1] : Ms. Ania Mei is a 38yo single  female., domiciled at [facility], [facility] DM type 1, DM neuropathy, fatty liver, migraine HA, pseudoseizures, pph ARMAAN, cluster B, 5 prior IPP admissions most recenlty 4/2023, 4 prior SA (drinking capeful of acetone, drinking hydrogen peroxide, taking 1/2 bottle ibuprofen, and drinking pinesol/bleach).   \par Ania most recently  reported severe severe stressors sine beginning of 2020 , including homelessness , changes in living situation, stress over the covid pandemic with losses within the family including his father on 11/28/22.  Ania reported increased of anxiety with episodes of falling, generalized shakiness, head pressure and inability to move, patients EEG and video EEG were reportedly negative for any seizure activity. \par \par On visit today, pt continues to report feeling overly groggy during the day and has been requiring daily naps. Will discontinue hydroxyzine and move lamotrigine to bedtime dosing. Pt reports overall improvement in mood. She denies any SI since last visit. No suicidal or homicidal thoughts. No overt psychosis or alvina on exam. Patient has appropriate protective factors in place. Is engaged in his treatment. No acute safety concerns. Outpatient level of care remains appropriate.\par \par \par Pt is not an imminent risk to self. She remains at a chronically elevated risks due to history of previous suicidal gestures and attempts, history of hospitalization, mood deregulation, hx of impulsivity. This however is mitigated by future talk, no current SI w/ intent or plan, identifies reasons for living, engaged in outpatient treatment, living in closely monitored facility. Furthermore, pt chronic risk will not be mitigated by inpatient hospitalization. She is not an imminent risk to self/other and outpatient level of care remains appropriate. \par  \par Safety plan discussed at lengths w/ pt/mother. Pt reports that she will call 911 or go to the nearest Emergency room should she become an imminent risk to self/other. Pt will also utilize suicide hotline number provided. \par \par continue lamictal 50mg (switch to bedtime dosing) \par Escitalopram 20mg po daily\par d/c hydroxyzine \par refer to distress tolerance groups  \par continue individual therapy w/ Suzan \par RTC 2 week \par

## 2023-05-11 NOTE — ED PROVIDER NOTE - PATIENT PORTAL LINK FT
Head, normocephalic, atraumatic, Face, Face within normal limits, Ears, External ears within normal limits
You can access the FollowMyHealth Patient Portal offered by [facility] by registering at the following website: [email or web address]. By joining [employer]’s FollowMyHealth portal, you will also be able to view your health information using other applications (apps) compatible with our system.

## 2023-05-12 ENCOUNTER — APPOINTMENT (OUTPATIENT)
Dept: OBGYN | Facility: CLINIC | Age: 38
End: 2023-05-12
Payer: MEDICAID

## 2023-05-12 VITALS
SYSTOLIC BLOOD PRESSURE: 118 MMHG | HEIGHT: 64 IN | WEIGHT: 190 LBS | DIASTOLIC BLOOD PRESSURE: 74 MMHG | BODY MASS INDEX: 32.44 KG/M2

## 2023-05-12 DIAGNOSIS — Z01.419 ENCOUNTER FOR GYNECOLOGICAL EXAMINATION (GENERAL) (ROUTINE) W/OUT ABNORMAL FINDINGS: ICD-10-CM

## 2023-05-12 DIAGNOSIS — F33.1 MAJOR DEPRESSIVE DISORDER, RECURRENT, MODERATE: ICD-10-CM

## 2023-05-12 PROCEDURE — 99395 PREV VISIT EST AGE 18-39: CPT

## 2023-05-12 NOTE — HISTORY OF PRESENT ILLNESS
[FreeTextEntry1] : Patient is 38 years old para 0-0-0-0 last menstrual period April 25, 2023.\par She has a history of irregular menstrual periods lasting 5 to 7 days\par She is presently without complaints

## 2023-05-15 ENCOUNTER — APPOINTMENT (OUTPATIENT)
Dept: PSYCHIATRY | Facility: CLINIC | Age: 38
End: 2023-05-15

## 2023-05-15 ENCOUNTER — OUTPATIENT (OUTPATIENT)
Dept: OUTPATIENT SERVICES | Facility: HOSPITAL | Age: 38
LOS: 1 days | End: 2023-05-15

## 2023-05-15 DIAGNOSIS — F41.1 GENERALIZED ANXIETY DISORDER: ICD-10-CM

## 2023-05-15 NOTE — DISCUSSION/SUMMARY
[FreeTextEntry1] : Patient's mother was asked by  and security to wait downstairs since patient was arguing with her about not wanting to come to clinic today for her session.  Spoke with patient who was sitting in her walker in the hallway by the stairs and elevator. She refused to come into the office to have her session, complaining of not feeling well physically due to chest pain and burning in her legs. Her mother was complaining of how she is being blamed for patient's behavior and that patient complains that she is not being helped by anyone.Encouraged patient to come into the office to discuss further to DBT skills group and to teach her how to sing onto Telehealth sessions but she refused. Informed patient and mother that a session can not be done in the hallway. Patient wanted to go home and said that she will call back later to schedule another therapy session.

## 2023-05-16 DIAGNOSIS — F41.1 GENERALIZED ANXIETY DISORDER: ICD-10-CM

## 2023-05-19 ENCOUNTER — NON-APPOINTMENT (OUTPATIENT)
Age: 38
End: 2023-05-19

## 2023-05-23 ENCOUNTER — NON-APPOINTMENT (OUTPATIENT)
Age: 38
End: 2023-05-23

## 2023-05-26 ENCOUNTER — APPOINTMENT (OUTPATIENT)
Dept: PSYCHIATRY | Facility: CLINIC | Age: 38
End: 2023-05-26
Payer: MEDICAID

## 2023-05-26 ENCOUNTER — OUTPATIENT (OUTPATIENT)
Dept: OUTPATIENT SERVICES | Facility: HOSPITAL | Age: 38
LOS: 1 days | End: 2023-05-26
Payer: MEDICAID

## 2023-05-26 DIAGNOSIS — F60.3 BORDERLINE PERSONALITY DISORDER: ICD-10-CM

## 2023-05-26 DIAGNOSIS — F33.1 MAJOR DEPRESSIVE DISORDER, RECURRENT, MODERATE: ICD-10-CM

## 2023-05-26 DIAGNOSIS — F41.1 GENERALIZED ANXIETY DISORDER: ICD-10-CM

## 2023-05-26 PROCEDURE — ZZZZZ: CPT

## 2023-05-26 PROCEDURE — 99213 OFFICE O/P EST LOW 20 MIN: CPT

## 2023-05-26 NOTE — PHYSICAL EXAM
[Cooperative] : cooperative [Euthymic] : euthymic [Full] : full [Clear] : clear [Linear/Goal Directed] : linear/goal directed [WNL] : within normal limits [Average] : average [FreeTextEntry2] : uses a walker  [de-identified] : Fair [de-identified] : Fair

## 2023-05-26 NOTE — HISTORY OF PRESENT ILLNESS
[No] : no [FreeTextEntry1] : \par  [FreeTextEntry2] : five inpatient admissions, most recently 4/2023 for SI, had also reported SA by drinking bleach/pinsole after father's death in 11/2022; prior to this patient has history of 3 remote SA in her teens (approx. 16/18 yo, reports taking capeful acetone, drinking hydrogen peroxide, and half bottle of ibuprofen)\par multiple failed medication trials \par multiple medical ED visits for pseudoseizures and other medical complaints \par  [FreeTextEntry3] : celexa\par cymbalta (not effective)\par zyprexa\par buspar (not effective)\par gabapentin (hives)\par melatonin  [de-identified] : \par

## 2023-05-26 NOTE — SOCIAL HISTORY
[Assisted Living Facility] : lives in an assisted living facility [Disabled] : disabled [Never ] : never  [High School] : high school [None] : none [No Known Substance Use] : no known substance use [Yes] : yes [No Known Use] : no known use [FreeTextEntry1] : [facility] for the past 2.5yrs [TextBox_52] : Prescribed

## 2023-05-27 DIAGNOSIS — F41.1 GENERALIZED ANXIETY DISORDER: ICD-10-CM

## 2023-05-27 DIAGNOSIS — F60.3 BORDERLINE PERSONALITY DISORDER: ICD-10-CM

## 2023-06-05 ENCOUNTER — NON-APPOINTMENT (OUTPATIENT)
Age: 38
End: 2023-06-05

## 2023-06-05 ENCOUNTER — APPOINTMENT (OUTPATIENT)
Dept: PSYCHIATRY | Facility: CLINIC | Age: 38
End: 2023-06-05

## 2023-06-06 ENCOUNTER — EMERGENCY (EMERGENCY)
Facility: HOSPITAL | Age: 38
LOS: 0 days | Discharge: HOME | End: 2023-06-06
Attending: EMERGENCY MEDICINE
Payer: MEDICAID

## 2023-06-06 VITALS
RESPIRATION RATE: 18 BRPM | OXYGEN SATURATION: 99 % | SYSTOLIC BLOOD PRESSURE: 135 MMHG | HEART RATE: 90 BPM | HEIGHT: 64 IN | WEIGHT: 190.04 LBS | TEMPERATURE: 98 F | DIASTOLIC BLOOD PRESSURE: 67 MMHG

## 2023-06-06 DIAGNOSIS — M54.50 LOW BACK PAIN, UNSPECIFIED: ICD-10-CM

## 2023-06-06 DIAGNOSIS — Z88.0 ALLERGY STATUS TO PENICILLIN: ICD-10-CM

## 2023-06-06 DIAGNOSIS — F41.8 OTHER SPECIFIED ANXIETY DISORDERS: ICD-10-CM

## 2023-06-06 DIAGNOSIS — G89.29 OTHER CHRONIC PAIN: ICD-10-CM

## 2023-06-06 DIAGNOSIS — E11.9 TYPE 2 DIABETES MELLITUS WITHOUT COMPLICATIONS: ICD-10-CM

## 2023-06-06 DIAGNOSIS — Z86.69 PERSONAL HISTORY OF OTHER DISEASES OF THE NERVOUS SYSTEM AND SENSE ORGANS: ICD-10-CM

## 2023-06-06 DIAGNOSIS — Z88.1 ALLERGY STATUS TO OTHER ANTIBIOTIC AGENTS STATUS: ICD-10-CM

## 2023-06-06 PROCEDURE — 99284 EMERGENCY DEPT VISIT MOD MDM: CPT | Mod: 25

## 2023-06-06 PROCEDURE — 96374 THER/PROPH/DIAG INJ IV PUSH: CPT

## 2023-06-06 PROCEDURE — 99284 EMERGENCY DEPT VISIT MOD MDM: CPT

## 2023-06-06 RX ORDER — KETOROLAC TROMETHAMINE 30 MG/ML
30 SYRINGE (ML) INJECTION ONCE
Refills: 0 | Status: DISCONTINUED | OUTPATIENT
Start: 2023-06-06 | End: 2023-06-06

## 2023-06-06 RX ORDER — ACETAMINOPHEN 500 MG
2 TABLET ORAL
Qty: 20 | Refills: 0
Start: 2023-06-06 | End: 2023-06-09

## 2023-06-06 RX ORDER — METHOCARBAMOL 500 MG/1
1000 TABLET, FILM COATED ORAL ONCE
Refills: 0 | Status: COMPLETED | OUTPATIENT
Start: 2023-06-06 | End: 2023-06-06

## 2023-06-06 RX ORDER — METHOCARBAMOL 500 MG/1
2 TABLET, FILM COATED ORAL
Qty: 18 | Refills: 0
Start: 2023-06-06 | End: 2023-06-08

## 2023-06-06 RX ADMIN — METHOCARBAMOL 1000 MILLIGRAM(S): 500 TABLET, FILM COATED ORAL at 19:20

## 2023-06-06 RX ADMIN — Medication 30 MILLIGRAM(S): at 19:20

## 2023-06-06 NOTE — ED PROVIDER NOTE - OBJECTIVE STATEMENT
38-year-old female past medical history of anxiety ,depression, pseudoseizures, and diabetes presents to the ED for acute on chronic back pain.  Patient states she is having lower back pain.  She has tried Tylenol and lidocaine patch which have not helped. denies fever,chills, N, V, CP,SOB, cough,  No numbness or tingling.  No bladder or bowel incontinence or retention.

## 2023-06-06 NOTE — ED PROVIDER NOTE - PATIENT PORTAL LINK FT
You can access the FollowMyHealth Patient Portal offered by [facility] by registering at the following website: [email or web address]. By joining [employer]s FollowMyHealth portal, you will also be able to view your health information using other applications (apps) compatible with our system.

## 2023-06-06 NOTE — ED PROVIDER NOTE - PROGRESS NOTE DETAILS
Patient's back pain feels improved.   Patient states she has chronic depression. Talked with Mom and she lives with her and denies any acute psychiatry concerns. Denies suicidal ideations at this time. Pt has good fu with therapy and psychiatry and has an appointment on Friday.  Will DC home

## 2023-06-06 NOTE — ED PROVIDER NOTE - PHYSICAL EXAMINATION
VITAL SIGNS: I have reviewed nursing notes and confirm.  CONSTITUTIONAL:  in no acute distress.  SKIN: Skin exam is warm and dry, no acute rash.  HEAD: Normocephalic; atraumatic.  EYES:  EOM intact; conjunctiva and sclera clear.  ENT: No nasal discharge; airway clear.   CARD: S1, S2 normal; no murmurs, gallops, or rubs. Regular rate and rhythm.  RESP: No wheezes, rales or rhonchi. Speaking in full sentences.   ABD: Normal bowel sounds; soft; non-distended; non-tender; No rebound or guarding. No CVA tenderness. no midline tenderness  EXT: Normal ROM. No clubbing, cyanosis or edema.  NEURO: Alert, oriented. Grossly unremarkable. No focal deficits.   PSYCH: Cooperative, appropriate.

## 2023-06-06 NOTE — ED PROVIDER NOTE - CLINICAL SUMMARY MEDICAL DECISION MAKING FREE TEXT BOX
37 yo F presented to ED for back pain which improved with medications. Pt neurologically intact. Dc home with tylenol and robaxin.

## 2023-06-06 NOTE — ED PROVIDER NOTE - ATTENDING APP SHARED VISIT CONTRIBUTION OF CARE
38-year-old female past medical history of anxiety depression pseudoseizures and diabetes presents the emergency department for acute on chronic back pain.  Patient states she is having lower back pain.  She has tried Tylenol and lidocaine patch which she remove the lidocaine patches as she said it burned.  No numbness or tingling.  No bladder or bowel incontinence or retention.      Const: NAD  Eyes: PERRL, no conjunctival injection  HENT:  Neck supple without meningismus   CV: RRR, Warm, well-perfused extremities  RESP: CTA B/L, no tachypnea   GI: soft, non-tender, non-distended  MSK: No gross deformities appreciated, No C-spine T-spine L-spine tenderness or step-offs.  Patient has right lumbar paraspinal tenderness.  Skin: Warm, dry. No rashes    will give toradol, robaxin and reassess

## 2023-06-06 NOTE — ED ADULT TRIAGE NOTE - TEMPERATURE IN FAHRENHEIT (DEGREES F)
"  Luciana Solis.     3/16/2018   [facility], [place], EMERGENCY DEPARTMENT     Gurinder Ring MD  03/16/18 [id number]     97.6

## 2023-06-07 ENCOUNTER — INPATIENT (INPATIENT)
Facility: HOSPITAL | Age: 38
LOS: 5 days | Discharge: HOME | DRG: 751 | End: 2023-06-13
Attending: PSYCHIATRY & NEUROLOGY | Admitting: PSYCHIATRY & NEUROLOGY
Payer: MEDICAID

## 2023-06-07 ENCOUNTER — NON-APPOINTMENT (OUTPATIENT)
Age: 38
End: 2023-06-07

## 2023-06-07 VITALS
WEIGHT: 190.04 LBS | HEIGHT: 64 IN | RESPIRATION RATE: 20 BRPM | DIASTOLIC BLOOD PRESSURE: 94 MMHG | SYSTOLIC BLOOD PRESSURE: 142 MMHG | OXYGEN SATURATION: 98 % | HEART RATE: 110 BPM | TEMPERATURE: 98 F

## 2023-06-07 DIAGNOSIS — F60.89 OTHER SPECIFIC PERSONALITY DISORDERS: ICD-10-CM

## 2023-06-07 DIAGNOSIS — F33.1 MAJOR DEPRESSIVE DISORDER, RECURRENT, MODERATE: ICD-10-CM

## 2023-06-07 DIAGNOSIS — F32.A DEPRESSION, UNSPECIFIED: ICD-10-CM

## 2023-06-07 LAB
ALBUMIN SERPL ELPH-MCNC: 4.3 G/DL — SIGNIFICANT CHANGE UP (ref 3.5–5.2)
ALP SERPL-CCNC: 162 U/L — HIGH (ref 30–115)
ALT FLD-CCNC: 61 U/L — HIGH (ref 0–41)
ANION GAP SERPL CALC-SCNC: 14 MMOL/L — SIGNIFICANT CHANGE UP (ref 7–14)
APAP SERPL-MCNC: <5 UG/ML — LOW (ref 10–30)
APPEARANCE UR: CLEAR — SIGNIFICANT CHANGE UP
AST SERPL-CCNC: 27 U/L — SIGNIFICANT CHANGE UP (ref 0–41)
BACTERIA # UR AUTO: NEGATIVE /HPF — SIGNIFICANT CHANGE UP
BASOPHILS # BLD AUTO: 0.05 K/UL — SIGNIFICANT CHANGE UP (ref 0–0.2)
BASOPHILS NFR BLD AUTO: 0.4 % — SIGNIFICANT CHANGE UP (ref 0–1)
BILIRUB DIRECT SERPL-MCNC: <0.2 MG/DL — SIGNIFICANT CHANGE UP (ref 0–0.3)
BILIRUB INDIRECT FLD-MCNC: >0.1 MG/DL — LOW (ref 0.2–1.2)
BILIRUB SERPL-MCNC: 0.3 MG/DL — SIGNIFICANT CHANGE UP (ref 0.2–1.2)
BILIRUB UR-MCNC: NEGATIVE — SIGNIFICANT CHANGE UP
BUN SERPL-MCNC: 10 MG/DL — SIGNIFICANT CHANGE UP (ref 10–20)
CALCIUM SERPL-MCNC: 9.1 MG/DL — SIGNIFICANT CHANGE UP (ref 8.4–10.5)
CHLORIDE SERPL-SCNC: 100 MMOL/L — SIGNIFICANT CHANGE UP (ref 98–110)
CO2 SERPL-SCNC: 22 MMOL/L — SIGNIFICANT CHANGE UP (ref 17–32)
COLOR SPEC: YELLOW — SIGNIFICANT CHANGE UP
CREAT SERPL-MCNC: 0.7 MG/DL — SIGNIFICANT CHANGE UP (ref 0.7–1.5)
DIFF PNL FLD: NEGATIVE — SIGNIFICANT CHANGE UP
EGFR: 113 ML/MIN/1.73M2 — SIGNIFICANT CHANGE UP
EOSINOPHIL # BLD AUTO: 0.02 K/UL — SIGNIFICANT CHANGE UP (ref 0–0.7)
EOSINOPHIL NFR BLD AUTO: 0.2 % — SIGNIFICANT CHANGE UP (ref 0–8)
EPI CELLS # UR: ABNORMAL /HPF (ref 0–5)
ETHANOL SERPL-MCNC: <10 MG/DL — SIGNIFICANT CHANGE UP
GLUCOSE BLDC GLUCOMTR-MCNC: 305 MG/DL — HIGH (ref 70–99)
GLUCOSE SERPL-MCNC: 276 MG/DL — HIGH (ref 70–99)
GLUCOSE UR QL: 500 MG/DL
HCG SERPL QL: NEGATIVE — SIGNIFICANT CHANGE UP
HCT VFR BLD CALC: 43.9 % — SIGNIFICANT CHANGE UP (ref 37–47)
HGB BLD-MCNC: 14.4 G/DL — SIGNIFICANT CHANGE UP (ref 12–16)
IMM GRANULOCYTES NFR BLD AUTO: 0.6 % — HIGH (ref 0.1–0.3)
KETONES UR-MCNC: 160
LACTATE SERPL-SCNC: 1.5 MMOL/L — SIGNIFICANT CHANGE UP (ref 0.7–2)
LEUKOCYTE ESTERASE UR-ACNC: NEGATIVE — SIGNIFICANT CHANGE UP
LIDOCAIN IGE QN: 10 U/L — SIGNIFICANT CHANGE UP (ref 7–60)
LYMPHOCYTES # BLD AUTO: 1.86 K/UL — SIGNIFICANT CHANGE UP (ref 1.2–3.4)
LYMPHOCYTES # BLD AUTO: 15.9 % — LOW (ref 20.5–51.1)
MCHC RBC-ENTMCNC: 28.2 PG — SIGNIFICANT CHANGE UP (ref 27–31)
MCHC RBC-ENTMCNC: 32.8 G/DL — SIGNIFICANT CHANGE UP (ref 32–37)
MCV RBC AUTO: 86.1 FL — SIGNIFICANT CHANGE UP (ref 81–99)
MONOCYTES # BLD AUTO: 0.57 K/UL — SIGNIFICANT CHANGE UP (ref 0.1–0.6)
MONOCYTES NFR BLD AUTO: 4.9 % — SIGNIFICANT CHANGE UP (ref 1.7–9.3)
NEUTROPHILS # BLD AUTO: 9.12 K/UL — HIGH (ref 1.4–6.5)
NEUTROPHILS NFR BLD AUTO: 78 % — HIGH (ref 42.2–75.2)
NITRITE UR-MCNC: NEGATIVE — SIGNIFICANT CHANGE UP
NRBC # BLD: 0 /100 WBCS — SIGNIFICANT CHANGE UP (ref 0–0)
PH UR: 5 — SIGNIFICANT CHANGE UP (ref 5–8)
PLATELET # BLD AUTO: 421 K/UL — HIGH (ref 130–400)
PMV BLD: 9.6 FL — SIGNIFICANT CHANGE UP (ref 7.4–10.4)
POTASSIUM SERPL-MCNC: 4.2 MMOL/L — SIGNIFICANT CHANGE UP (ref 3.5–5)
POTASSIUM SERPL-SCNC: 4.2 MMOL/L — SIGNIFICANT CHANGE UP (ref 3.5–5)
PROT SERPL-MCNC: 7.4 G/DL — SIGNIFICANT CHANGE UP (ref 6–8)
PROT UR-MCNC: 30 MG/DL
RBC # BLD: 5.1 M/UL — SIGNIFICANT CHANGE UP (ref 4.2–5.4)
RBC # FLD: 13.9 % — SIGNIFICANT CHANGE UP (ref 11.5–14.5)
RBC CASTS # UR COMP ASSIST: SIGNIFICANT CHANGE UP /HPF (ref 0–4)
SALICYLATES SERPL-MCNC: <0.3 MG/DL — LOW (ref 4–30)
SARS-COV-2 RNA SPEC QL NAA+PROBE: SIGNIFICANT CHANGE UP
SODIUM SERPL-SCNC: 136 MMOL/L — SIGNIFICANT CHANGE UP (ref 135–146)
SP GR SPEC: 1.02 — SIGNIFICANT CHANGE UP (ref 1.01–1.03)
TROPONIN T SERPL-MCNC: <0.01 NG/ML — SIGNIFICANT CHANGE UP
UROBILINOGEN FLD QL: 0.2 MG/DL — SIGNIFICANT CHANGE UP
WBC # BLD: 11.69 K/UL — HIGH (ref 4.8–10.8)
WBC # FLD AUTO: 11.69 K/UL — HIGH (ref 4.8–10.8)
WBC UR QL: SIGNIFICANT CHANGE UP /HPF (ref 0–5)

## 2023-06-07 PROCEDURE — 71045 X-RAY EXAM CHEST 1 VIEW: CPT | Mod: 26

## 2023-06-07 PROCEDURE — 82962 GLUCOSE BLOOD TEST: CPT

## 2023-06-07 PROCEDURE — 76705 ECHO EXAM OF ABDOMEN: CPT | Mod: 26

## 2023-06-07 PROCEDURE — 83036 HEMOGLOBIN GLYCOSYLATED A1C: CPT

## 2023-06-07 PROCEDURE — 99285 EMERGENCY DEPT VISIT HI MDM: CPT

## 2023-06-07 PROCEDURE — 97162 PT EVAL MOD COMPLEX 30 MIN: CPT | Mod: GP

## 2023-06-07 PROCEDURE — 36415 COLL VENOUS BLD VENIPUNCTURE: CPT

## 2023-06-07 RX ORDER — DEXTROSE 50 % IN WATER 50 %
25 SYRINGE (ML) INTRAVENOUS ONCE
Refills: 0 | Status: DISCONTINUED | OUTPATIENT
Start: 2023-06-07 | End: 2023-06-13

## 2023-06-07 RX ORDER — SODIUM CHLORIDE 9 MG/ML
1000 INJECTION, SOLUTION INTRAVENOUS
Refills: 0 | Status: DISCONTINUED | OUTPATIENT
Start: 2023-06-07 | End: 2023-06-13

## 2023-06-07 RX ORDER — DEXTROSE 50 % IN WATER 50 %
15 SYRINGE (ML) INTRAVENOUS ONCE
Refills: 0 | Status: DISCONTINUED | OUTPATIENT
Start: 2023-06-07 | End: 2023-06-13

## 2023-06-07 RX ORDER — DEXTROSE 50 % IN WATER 50 %
12.5 SYRINGE (ML) INTRAVENOUS ONCE
Refills: 0 | Status: DISCONTINUED | OUTPATIENT
Start: 2023-06-07 | End: 2023-06-13

## 2023-06-07 RX ORDER — ESCITALOPRAM OXALATE 10 MG/1
20 TABLET, FILM COATED ORAL DAILY
Refills: 0 | Status: DISCONTINUED | OUTPATIENT
Start: 2023-06-07 | End: 2023-06-13

## 2023-06-07 RX ORDER — KETOROLAC TROMETHAMINE 30 MG/ML
30 SYRINGE (ML) INJECTION ONCE
Refills: 0 | Status: DISCONTINUED | OUTPATIENT
Start: 2023-06-07 | End: 2023-06-07

## 2023-06-07 RX ORDER — PANTOPRAZOLE SODIUM 20 MG/1
40 TABLET, DELAYED RELEASE ORAL
Refills: 0 | Status: DISCONTINUED | OUTPATIENT
Start: 2023-06-07 | End: 2023-06-13

## 2023-06-07 RX ORDER — INSULIN GLARGINE 100 [IU]/ML
30 INJECTION, SOLUTION SUBCUTANEOUS AT BEDTIME
Refills: 0 | Status: DISCONTINUED | OUTPATIENT
Start: 2023-06-07 | End: 2023-06-09

## 2023-06-07 RX ORDER — INSULIN LISPRO 100/ML
VIAL (ML) SUBCUTANEOUS
Refills: 0 | Status: DISCONTINUED | OUTPATIENT
Start: 2023-06-07 | End: 2023-06-13

## 2023-06-07 RX ORDER — AMITRIPTYLINE HCL 25 MG
75 TABLET ORAL AT BEDTIME
Refills: 0 | Status: DISCONTINUED | OUTPATIENT
Start: 2023-06-07 | End: 2023-06-13

## 2023-06-07 RX ORDER — GLUCAGON INJECTION, SOLUTION 0.5 MG/.1ML
1 INJECTION, SOLUTION SUBCUTANEOUS ONCE
Refills: 0 | Status: DISCONTINUED | OUTPATIENT
Start: 2023-06-07 | End: 2023-06-13

## 2023-06-07 RX ORDER — METOPROLOL TARTRATE 50 MG
50 TABLET ORAL DAILY
Refills: 0 | Status: DISCONTINUED | OUTPATIENT
Start: 2023-06-07 | End: 2023-06-08

## 2023-06-07 RX ORDER — ACETAMINOPHEN 500 MG
650 TABLET ORAL EVERY 6 HOURS
Refills: 0 | Status: DISCONTINUED | OUTPATIENT
Start: 2023-06-07 | End: 2023-06-10

## 2023-06-07 RX ORDER — SODIUM CHLORIDE 9 MG/ML
1000 INJECTION INTRAMUSCULAR; INTRAVENOUS; SUBCUTANEOUS ONCE
Refills: 0 | Status: COMPLETED | OUTPATIENT
Start: 2023-06-07 | End: 2023-06-07

## 2023-06-07 RX ORDER — ONDANSETRON 8 MG/1
4 TABLET, FILM COATED ORAL
Refills: 0 | Status: DISCONTINUED | OUTPATIENT
Start: 2023-06-07 | End: 2023-06-13

## 2023-06-07 RX ORDER — ACETAMINOPHEN 500 MG
975 TABLET ORAL ONCE
Refills: 0 | Status: COMPLETED | OUTPATIENT
Start: 2023-06-07 | End: 2023-06-07

## 2023-06-07 RX ORDER — INSULIN LISPRO 100/ML
8 VIAL (ML) SUBCUTANEOUS
Refills: 0 | Status: DISCONTINUED | OUTPATIENT
Start: 2023-06-07 | End: 2023-06-13

## 2023-06-07 RX ORDER — LAMOTRIGINE 25 MG/1
100 TABLET, ORALLY DISINTEGRATING ORAL DAILY
Refills: 0 | Status: DISCONTINUED | OUTPATIENT
Start: 2023-06-07 | End: 2023-06-08

## 2023-06-07 RX ORDER — IBUPROFEN 200 MG
400 TABLET ORAL ONCE
Refills: 0 | Status: COMPLETED | OUTPATIENT
Start: 2023-06-07 | End: 2023-06-07

## 2023-06-07 RX ADMIN — Medication 400 MILLIGRAM(S): at 21:57

## 2023-06-07 RX ADMIN — Medication 400 MILLIGRAM(S): at 22:26

## 2023-06-07 RX ADMIN — ONDANSETRON 4 MILLIGRAM(S): 8 TABLET, FILM COATED ORAL at 20:43

## 2023-06-07 RX ADMIN — Medication 650 MILLIGRAM(S): at 20:39

## 2023-06-07 RX ADMIN — Medication 75 MILLIGRAM(S): at 20:39

## 2023-06-07 RX ADMIN — Medication 975 MILLIGRAM(S): at 11:45

## 2023-06-07 RX ADMIN — INSULIN GLARGINE 30 UNIT(S): 100 INJECTION, SOLUTION SUBCUTANEOUS at 20:38

## 2023-06-07 RX ADMIN — Medication 30 MILLIGRAM(S): at 17:49

## 2023-06-07 RX ADMIN — Medication 975 MILLIGRAM(S): at 15:00

## 2023-06-07 RX ADMIN — SODIUM CHLORIDE 1000 MILLILITER(S): 9 INJECTION INTRAMUSCULAR; INTRAVENOUS; SUBCUTANEOUS at 10:36

## 2023-06-07 RX ADMIN — Medication 650 MILLIGRAM(S): at 22:39

## 2023-06-07 RX ADMIN — Medication 30 MILLIGRAM(S): at 15:12

## 2023-06-07 NOTE — PROVIDER CONTACT NOTE (OTHER) - SITUATION
Patient stated she fell in the bathroom "my foot gave in". Patient found sitting just outside the bathroom. When asked what part of her body hit the floor patient stated "I sat on the floor".

## 2023-06-07 NOTE — ED BEHAVIORAL HEALTH NOTE - BEHAVIORAL HEALTH NOTE
===================   PRE-HOSPITAL COURSE   ==================   SOURCE: ED Provider    DETAILS: Telepsych consult requested for 38-year-old Female with a PPHx of anxiety and depression, BIB EMS from [facility], with c/o lower back, abdominal and chest pain as well as suicidal ideation with thoughts of drinking laundry detergent and nail polish remover. Pt reports to have several previous inpatient psych hospitalizations. Pt denies substance use. Pt reports that she recently cancelled her follow up appointment with her psychiatrist.   ============   ED COURSE   ============   SOURCE: ED RN Astrid  ARRIVAL: [facility]     BELONGINGS: No belongings of note     BEHAVIOR: Per ED RN, Pt is calm and cooperative with anxious affect and congruent mood at bedside. Pt endorses chest discomfort, abdominal pain, chest pain and suicidal ideation with plan. Pt remains on a 1:1 for constant observation. Pt remains in behavioral and impulse control and is not violent or aggressive. Pt appears to be well-kept and maintains fair hygiene.       TREATMENT: Pt received Tylenol 975 and fluids.  VISITORS: Pts Mom is at bedside.    ------------------------------------------------   COVID Exposure Screen- collateral (i.e. third-party, chart review, belongings, etc; include EMS and ED staff)   ---------------------------------------------------   1. Has the patient had a COVID-19 test in the last 90 days? Unknown.   2. Has the patient tested positive for COVID-19 antibodies? Unknown.   3.Has the patient received 2 doses of the COVID-19 vaccine?  Unknown.   4. In the past 10 days, has the patient been around anyone with a positive COVID-19 test?* Unknown.   5.Has the patient been out of [place] within the past 10 days? Unknown.

## 2023-06-07 NOTE — ED BEHAVIORAL HEALTH ASSESSMENT NOTE - SUMMARY
Patient is a 38 year old female, domiciled with mother at [facility], currently applying for disability, PMHx of T1D, diabetic neuropathy, HTN, chronic back pain, migraine headaches, PPHx depression/anxiety and cluster B personality disorder, 7 previous psychiatric admissions (most recently discharged from [facility] on 4/25/23), multiple previous suicide attempts (remote hx of once by taking a capful of acetone, another by drinking hydrogen peroxide, another attempt by taking half a bottle of ibuprofen; most recent SA 4-5mo ago by ingesting Pinesol, no known medical consequences), no legal or violence hx, denies substance use, BIBEMS activated by residence facility after patient expressed suicidal ideation.    On assessment, patient tearful, reporting increasing suicidal thoughts over the past few days with no clear acute stressor. She is unable to safety plan at this time and cannot contract for safety if discharged. Collateral received from mother corroborates hx. While patient has hx of becoming acutely dysregulated and endorsing suicidal ideation, patient's unwillingness to safety plan and access to means does increase her acute risk of harming herself. Patient seeking voluntary admission to inpatient unit and is appropriate for admission for acute safety and stabilization. Recommend inpatient hospitalization when patient medically cleared.    PLAN  - Admit on 9.13 Voluntary Legal status once medically cleared  - Pt requires recommendations re: diabetes as patient cannot have insulin pump on unit  - Continue home medication (med rec as above from nursing home) - Lexapro 20mg daily, Lamictal 100mg QHS + medical medications  - No plans to acutely harm herself in the hospital, routine observation appropriate on IPU  - PRNs for agitation: Ativan 2mg + Benadryl 50mg PO/IM q4h
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
## 2023-06-07 NOTE — ED ADULT NURSE NOTE - NSFALLUNIVINTERV_ED_ALL_ED
Bed/Stretcher in lowest position, wheels locked, appropriate side rails in place/Call bell, personal items and telephone in reach/Instruct patient to call for assistance before getting out of bed/chair/stretcher/Non-slip footwear applied when patient is off stretcher/Rush to call system/Physically safe environment - no spills, clutter or unnecessary equipment/Purposeful proactive rounding/Room/bathroom lighting operational, light cord in reach

## 2023-06-07 NOTE — ED BEHAVIORAL HEALTH ASSESSMENT NOTE - NSBHMSEPERCEPT_PSY_A_CORE
No abnormalities Xolair Pregnancy And Lactation Text: This medication is Pregnancy Category B and is considered safe during pregnancy. This medication is excreted in breast milk.

## 2023-06-07 NOTE — ED BEHAVIORAL HEALTH ASSESSMENT NOTE - ADDITIONAL DETAILS ALL
Patient with active suicidal ideation, with plan to drink laundry detergent. Multiple previous attempts as above.

## 2023-06-07 NOTE — BH PATIENT PROFILE - FALL HARM RISK - HARM RISK INTERVENTIONS

## 2023-06-07 NOTE — ED BEHAVIORAL HEALTH ASSESSMENT NOTE - RISK ASSESSMENT
Chronic risk factors include psychiatric diagnoses, previous psychiatric admissions, previous suicide attempts, chronic medical conditions   Acute risk factors include inability to safety plan, depressed mood, poor frustration tolerance, acute on chronic pain  Protective factors include residential stability, sobriety, willingness to engage in treatment

## 2023-06-07 NOTE — PROVIDER CONTACT NOTE (OTHER) - ACTION/TREATMENT ORDERED:
TORY Deutsch and Dr. Mott assessed patient. One time dose of Motrim 400mg ordered and given at 2140 with good effect. TORY Deutsch and Dr. Mott assessed patient. One time dose of Motrim 400mg ordered and given at 2157 with good effect.

## 2023-06-07 NOTE — ED BEHAVIORAL HEALTH ASSESSMENT NOTE - PSYCHIATRIC ISSUES AND PLAN (INCLUDE STANDING AND PRN MEDICATION)
Continue Lexapro 20mg daily, Lamictal 100mg QHS. PRNs for agitation: Ativan 2mg + Benadryl 50mg q4h PO/IM

## 2023-06-07 NOTE — BH PATIENT PROFILE - HOME MEDICATIONS
acetaminophen 500 mg oral tablet , 2 tab(s) orally prn  methocarbamol 750 mg oral tablet , 2 tab(s) orally 3 times a day  ibuprofen 400 mg oral tablet , 1 tab(s) orally every 6 hours x 14 days as needed for Mild Pain (1 - 3) Continue to take as prescribed until told otherwise by your provider MDD: 4  pantoprazole 40 mg oral delayed release tablet , 1 tab(s) orally once a day (before a meal) x 14 days Continue to take as prescribed until told otherwise by your provider  ferrous sulfate 325 mg (65 mg elemental iron) oral tablet , 1 tab(s) orally once a day x 14 days Continue to take as prescribed until told otherwise by your provider  metoprolol tartrate 25 mg oral tablet , 1 tab(s) orally 2 times a day x 14 days Continue to take as prescribed until told otherwise by your provider  hydrOXYzine hydrochloride 50 mg oral tablet , 1 tab(s) orally every 6 hours x 14 days as needed for anxiety and pseudoseizures Continue to take as prescribed until told otherwise by your provider  loratadine 10 mg oral tablet , 1 tab(s) orally once a day x 14 days as needed for seasonal allergy Continue to take as prescribed until told otherwise by your provider  ondansetron 4 mg oral tablet, disintegrating , 1 tab(s) orally 3 times a day x 14 days as needed for  nausea Continue to take as prescribed until told otherwise by your provider  Lexapro 20 mg oral tablet , 1 tab(s) orally once a day x 14 days Continue to take as prescribed until told otherwise by your provider  amitriptyline 75 mg oral tablet , 1 tab(s) orally once a day (at bedtime) x 14 days Continue to take as prescribed until told otherwise by your provider  lamoTRIgine 25 mg oral tablet , 1 tab(s) orally once a day Continue to take as prescribed until told otherwise by your provider  albuterol 90 mcg/inh inhalation powder , 1 puff(s) inhaled every 6 hours as needed for  bronchospasm  aluminum hydroxide-magnesium hydroxide 200 mg-200 mg/5 mL oral suspension , 30 milliliter(s) orally every 6 hours as needed for Dyspepsia  acetaminophen 500 mg oral tablet , 2 tab(s) orally every 8 hours

## 2023-06-07 NOTE — BH PATIENT PROFILE - FALL HARM RISK - TYPE OF ASSESSMENT
----- Message from Tiffanie Kowalski sent at 1/30/2023  4:25 PM CST -----  Contact: [phone]  Pt is requesting a short refill on her metoprolol succinate (TOPROL-XL) 25 MG 24 hr tablet. Please call pt back at [phone].        [facility] [address] [facility] [id number] [address] [address]  Phone: [phone] Fax: [phone]        Thanks KB     
Admission

## 2023-06-07 NOTE — ED PROVIDER NOTE - OBJECTIVE STATEMENT
Is a 38-year-old female past history of anxiety, depression here for evaluation of 3 days of chest discomfort low back pain abdominal discomfort for 3 days's been constant associated suicidal ideation.  Patient states she is had suicidal thoughts since March but as of late had a plan to drink laundry detergent and nail polish remover 2 days ago.  Patient denies homicidal ideation, illicit drug use, weakness, numbness, shortness of breath, fever, chills, leg swelling

## 2023-06-07 NOTE — ED BEHAVIORAL HEALTH ASSESSMENT NOTE - MEDICAL ISSUES AND PLAN (INCLUDE STANDING AND PRN MEDICATION)
pending recommendations from ED team re: insulin; Continue ferrous sulfate 325mg daily, Protonix 40mg daily, Lopressor 25mg BID, amitriptyline 75mg QHS, Albuterol pump q8h prn, Zofran 4mg q8h prn, Motrin prn

## 2023-06-07 NOTE — ED BEHAVIORAL HEALTH ASSESSMENT NOTE - OTHER PAST PSYCHIATRIC HISTORY (INCLUDE DETAILS REGARDING ONSET, COURSE OF ILLNESS, INPATIENT/OUTPATIENT TREATMENT)
Multiple previous psychiatric admissions (most recent to [facility] in April 2023)  In outpatient treatment with Dr. Levin (psychiatrist), Kylee (therapist)  Currently on Lexapro and Lamictal  Multiple past suicidal gestures

## 2023-06-07 NOTE — CHART NOTE - NSCHARTNOTEFT_GEN_A_CORE
Pt seen at bedside with PCA Geetha BERNARD as chaperone to assess pt s/p fall about 2030.     Pt stated she was in the bathroom and her foot slid forward and ended up on the floor in the sitting position. Denies any head trauma. States she has back pain and b/l LE burning pain which she stated is chronic and was there before the incident. Denies any new pain. Denies any CP, SOB, abd pain, HA, dizziness.     As per multiple staff members, pt has a Hx of putting herself on the floor on purpose in an effort to have herself placed on a 1:1 observation    PE:   BACK: ttp right lower back adjacent to midline, no sign of trauma, good ROM  LE: no trauma appreciated to b/l LE, good ROM    Pt was able to get in and out of bed on her own and was able to ambulate with no complications.     Will Rx motrin and monitor    Case discussed with Dr Mott
Patient is on humalog insulin pump at home. As inpatient, will continue insulin administration with basal/bolus. During last admission, patient was on 30 units lantus QHS and 8 units with meals, later increased to 35 units QHS and 10 units with meals. Will start with more conservative dosing (30/8) with sliding scale coverage and adjust dose as necessary. Formal medicine consult to be completed tomorrow by hospitalist.     Discussed with Dr. Bowie.
Patient seen at bedside with same chaperon used by PA earlier. Patient laying in bed, says she is comfortable, just needs an extra pillow. Agree with assessment and plan as outlined by PA

## 2023-06-07 NOTE — PROVIDER CONTACT NOTE (OTHER) - ASSESSMENT
Patient was in the same amount of back and leg pain as she had before the fall. Patient had Tylenol 650mg minutes before the fall.

## 2023-06-07 NOTE — ED PROVIDER NOTE - CHIEF COMPLAINT
HAND-OFF: 

Report given to Magda/RN, Patient is in stable condition, Endorsed plan of care. The patient is a 38y Female complaining of suicidal thoughts.

## 2023-06-07 NOTE — ED BEHAVIORAL HEALTH ASSESSMENT NOTE - NSBHATTESTCOMMENTATTENDFT_PSY_A_CORE
POC Strep in process. Mother aware of POC and lab wait times.  Mother aware pt needs to remain NPO.   Patient is a 38 year old female, domiciled with mother at [facility], currently applying for disability, PMHx of T1D, diabetic neuropathy, HTN, chronic back pain, migraine headaches, PPHx depression/anxiety and cluster B personality disorder, 7 previous psychiatric admissions (most recently discharged from [facility] on 4/25/23), multiple previous suicide attempts (remote hx of once by taking a capful of acetone, another by drinking hydrogen peroxide, another attempt by taking half a bottle of ibuprofen; most recent SA 4-5mo ago by ingesting Pinesol, no known medical consequences), no legal or violence hx, denies substance use, BIBEMS activated by residence facility after patient expressed suicidal ideation.    Patient reports worsening mood and difficulty coping since her father passed away in November. She reports hospitalization last time was somewhat helpful, and reports she was referred to [facility] but they deemed her "not appropriate." Patient continues to say she feels quite hopeless and is having thoughts about drinking laundry detergent, and is unsure about her ability to control her urges. Patient on exam is tearful, anxious, somewhat labile. She requests voluntary hospitalization, and given her history of self-harm/impulsivity, she would be appropriate for hospitalization to ensure safety and for stabilization.

## 2023-06-07 NOTE — ED PROVIDER NOTE - DIFFERENTIAL DIAGNOSIS
Depression, ACS, UTI, abdominal suction, pancreatitis, biliary colic, anxiety Differential Diagnosis

## 2023-06-07 NOTE — DISCUSSION/SUMMARY
[FreeTextEntry1] : Patient left a message stating that she was in the [facility] yesterday due to back pain, burning in her leg, and chest pain. She reports that she informed ER staff that she was having suicide thoughts relating to drinking nail polish remover and laundry detergent but they did not admit her. She is now going to [facility].

## 2023-06-07 NOTE — ED PROVIDER NOTE - ATTENDING APP SHARED VISIT CONTRIBUTION OF CARE
38-year-old female with history of anxiety depression presents with lower back pain abdominal pain chest pain as well as suicidal ideation.  Patient reports thoughts of drinking long detergent nail polish remover.  Here patient calm cooperative alert oriented S1-S2 clear to auscultation bilaterally soft nontender  Impression  Patient here with suicidal ideation.  Screening medical work-up demonstrates no sig findings including normal bilirubin, alcohol, Tylenol, and salicylate level.  Right upper quadrant ultrasound demonstrates fatty liver patient medically cleared admitted for suicidal ideation.

## 2023-06-07 NOTE — BH PATIENT PROFILE - FALL HARM RISK - CONCLUSION
Wero Zelaya MD  You 8 minutes ago (3:02 PM)       Escitalopram will not help, can double the diazepam     Message text        Discussed MD recommendations with pt. Med rec updated.  Advised to call office if symptoms become worse or no further improvement.    Her  just passed away. Offered my condolences.  Pt verbalized understanding and agreement with plan of care.        Fall with Harm Risk

## 2023-06-07 NOTE — ED BEHAVIORAL HEALTH ASSESSMENT NOTE - CURRENT MEDICATION
Detail Level: Detailed
Ferrous sulfate 325mg daily  Lexapro 20mg daily  Protonix 40mg daily  Lopressor 25mg BID  amitriptyline 75mg QHS  Lamictal 100mg QHS  Albuterol pump q8h prn  Zofran 4mg q8h prn  Motrin prn  Insulin pump

## 2023-06-07 NOTE — ED PROVIDER NOTE - PROGRESS NOTE DETAILS
Patient will be a voluntary admission after psychiatric evaluation, psych recommended right upper quadrant and medicine consult for insulin orders, both of which were performed, right upper quadrant negative, will admit.
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
## 2023-06-07 NOTE — ED PROVIDER NOTE - PHYSICAL EXAMINATION
VITAL SIGNS: I have reviewed nursing notes and confirm.  CONSTITUTIONAL: Well-developed; well-nourished  SKIN:  skin exam is warm and dry, no acute rash.    HEAD: Normocephalic; atraumatic.  EYES: conjunctiva and sclera clear.  ENT: No nasal discharge; airway clear.  CARD: S1, S2 normal; no murmurs, gallops, or rubs. Regular rate and rhythm.   RESP: No wheezes, rales or rhonchi.  ABD: Normal bowel sounds; soft; non-distended; non-tender  NEURO: Alert, oriented, grossly unremarkable

## 2023-06-07 NOTE — ED BEHAVIORAL HEALTH ASSESSMENT NOTE - DESCRIPTION
Patient calm, cooperative in ED. No IM PRN medications necessary in ED. Patient on 1:1 for suicidal ideation.     Vital Signs Last 24 Hrs  T(C): 36.4 (07 Jun 2023 09:54), Max: 36.4 (06 Jun 2023 18:11)  T(F): 97.6 (07 Jun 2023 09:54), Max: 97.6 (06 Jun 2023 18:11)  HR: 110 (07 Jun 2023 09:54) (90 - 110)  BP: 142/94 (07 Jun 2023 09:54) (135/67 - 142/94)  BP(mean): --  RR: 20 (07 Jun 2023 09:54) (18 - 20)  SpO2: 98% (07 Jun 2023 09:54) (98% - 99%)    Parameters below as of 07 Jun 2023 09:54  Patient On (Oxygen Delivery Method): room air T1D, diabetic neuropathy, HTN, chronic back pain, migraine headaches see HPI

## 2023-06-07 NOTE — ED PROVIDER NOTE - CLINICAL SUMMARY MEDICAL DECISION MAKING FREE TEXT BOX
Patient here with suicidal ideation.  Screening medical work-up demonstrates no sig findings including normal bilirubin, alcohol, Tylenol, and salicylate level.  Right upper quadrant ultrasound demonstrates fatty liver patient medically cleared admitted for suicidal ideation.

## 2023-06-08 ENCOUNTER — NON-APPOINTMENT (OUTPATIENT)
Age: 38
End: 2023-06-08

## 2023-06-08 LAB
A1C WITH ESTIMATED AVERAGE GLUCOSE RESULT: 10.7 % — HIGH (ref 4–5.6)
CULTURE RESULTS: SIGNIFICANT CHANGE UP
ESTIMATED AVERAGE GLUCOSE: 260 MG/DL — HIGH (ref 68–114)
GLUCOSE BLDC GLUCOMTR-MCNC: 217 MG/DL — HIGH (ref 70–99)
GLUCOSE BLDC GLUCOMTR-MCNC: 241 MG/DL — HIGH (ref 70–99)
GLUCOSE BLDC GLUCOMTR-MCNC: 271 MG/DL — HIGH (ref 70–99)
GLUCOSE BLDC GLUCOMTR-MCNC: 395 MG/DL — HIGH (ref 70–99)
SPECIMEN SOURCE: SIGNIFICANT CHANGE UP

## 2023-06-08 PROCEDURE — 99253 IP/OBS CNSLTJ NEW/EST LOW 45: CPT

## 2023-06-08 PROCEDURE — 99232 SBSQ HOSP IP/OBS MODERATE 35: CPT

## 2023-06-08 RX ORDER — LAMOTRIGINE 25 MG/1
150 TABLET, ORALLY DISINTEGRATING ORAL AT BEDTIME
Refills: 0 | Status: DISCONTINUED | OUTPATIENT
Start: 2023-06-08 | End: 2023-06-13

## 2023-06-08 RX ORDER — HYDROXYZINE HCL 10 MG
50 TABLET ORAL EVERY 6 HOURS
Refills: 0 | Status: DISCONTINUED | OUTPATIENT
Start: 2023-06-08 | End: 2023-06-13

## 2023-06-08 RX ORDER — METOPROLOL TARTRATE 50 MG
25 TABLET ORAL
Refills: 0 | Status: DISCONTINUED | OUTPATIENT
Start: 2023-06-08 | End: 2023-06-13

## 2023-06-08 RX ORDER — HALOPERIDOL DECANOATE 100 MG/ML
5 INJECTION INTRAMUSCULAR EVERY 8 HOURS
Refills: 0 | Status: DISCONTINUED | OUTPATIENT
Start: 2023-06-08 | End: 2023-06-13

## 2023-06-08 RX ORDER — INSULIN LISPRO 100/ML
5 VIAL (ML) SUBCUTANEOUS ONCE
Refills: 0 | Status: COMPLETED | OUTPATIENT
Start: 2023-06-08 | End: 2023-06-08

## 2023-06-08 RX ADMIN — Medication 8 UNIT(S): at 16:38

## 2023-06-08 RX ADMIN — LAMOTRIGINE 100 MILLIGRAM(S): 25 TABLET, ORALLY DISINTEGRATING ORAL at 11:17

## 2023-06-08 RX ADMIN — PANTOPRAZOLE SODIUM 40 MILLIGRAM(S): 20 TABLET, DELAYED RELEASE ORAL at 06:42

## 2023-06-08 RX ADMIN — INSULIN GLARGINE 30 UNIT(S): 100 INJECTION, SOLUTION SUBCUTANEOUS at 20:40

## 2023-06-08 RX ADMIN — Medication 2: at 16:38

## 2023-06-08 RX ADMIN — LAMOTRIGINE 150 MILLIGRAM(S): 25 TABLET, ORALLY DISINTEGRATING ORAL at 20:36

## 2023-06-08 RX ADMIN — Medication 75 MILLIGRAM(S): at 20:41

## 2023-06-08 RX ADMIN — ONDANSETRON 4 MILLIGRAM(S): 8 TABLET, FILM COATED ORAL at 05:07

## 2023-06-08 RX ADMIN — Medication 650 MILLIGRAM(S): at 06:45

## 2023-06-08 RX ADMIN — Medication 2: at 11:18

## 2023-06-08 RX ADMIN — Medication 8 UNIT(S): at 11:17

## 2023-06-08 RX ADMIN — Medication 25 MILLIGRAM(S): at 20:36

## 2023-06-08 RX ADMIN — Medication 8 UNIT(S): at 07:22

## 2023-06-08 RX ADMIN — ESCITALOPRAM OXALATE 20 MILLIGRAM(S): 10 TABLET, FILM COATED ORAL at 11:17

## 2023-06-08 RX ADMIN — Medication 5 UNIT(S): at 20:56

## 2023-06-08 RX ADMIN — Medication 3: at 07:23

## 2023-06-08 NOTE — BH SOCIAL WORK INITIAL PSYCHOSOCIAL EVALUATION - OTHER PAST PSYCHIATRIC HISTORY (INCLUDE DETAILS REGARDING ONSET, COURSE OF ILLNESS, INPATIENT/OUTPATIENT TREATMENT)
depression/anxiety and cluster B personality disorder, 7 previous psychiatric admissions (most recently discharged from [facility] on 4/25/23 Include Pregnancy/Lactation Warning?: No

## 2023-06-08 NOTE — PHYSICAL THERAPY INITIAL EVALUATION ADULT - PERTINENT HX OF CURRENT PROBLEM, REHAB EVAL
Patient is a 38 year old female, domiciled with mother at [facility], currently applying for disability, PMHx of T1D, diabetic neuropathy, HTN, chronic back pain, migraine headaches, PPHx depression/anxiety and cluster B personality disorder, 7 previous psychiatric admissions (most recently discharged from [facility] on 4/25/23), multiple previous suicide attempts (remote hx of once by taking a capful of acetone, another by drinking hydrogen peroxide, another attempt by taking half a bottle of ibuprofen; most recent SA 4-5mo ago by ingesting Pinesol, no known medical consequences), no legal or violence hx, denies substance use, BIBEMS activated by residence facility after patient expressed suicidal ideation.
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
## 2023-06-08 NOTE — BH SOCIAL WORK INITIAL PSYCHOSOCIAL EVALUATION - NSHIGHRISKBEHFT_PSY_ALL_CORE
multiple previous suicide attempts (remote hx of once by taking a capful of acetone, another by drinking hydrogen peroxide, another attempt by taking half a bottle of ibuprofen; most recent SA 4-5mo ago by ingesting Pinesol, no known medical consequences)
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
## 2023-06-08 NOTE — BH INPATIENT PSYCHIATRY ASSESSMENT NOTE - NSBHMETABOLIC_PSY_ALL_CORE_FT
BMI: BMI (kg/m2): 32.7 (06-07-23 @ 19:06)  HbA1c: A1C with Estimated Average Glucose Result: 10.7 % (06-08-23 @ 07:00)    Glucose: POCT Blood Glucose.: 217 mg/dL (06-08-23 @ 11:08)    BP: 167/96 (06-08-23 @ 08:20) (142/94 - 167/96)  Lipid Panel:  BMI: BMI (kg/m2): 32.7 (06-07-23 @ 19:06)  HbA1c: A1C with Estimated Average Glucose Result: 10.7 % (06-08-23 @ 07:00)    Glucose: POCT Blood Glucose.: 241 mg/dL (06-08-23 @ 16:12)    BP: 110/68 (06-08-23 @ 15:55) (110/68 - 167/96)  Lipid Panel:

## 2023-06-08 NOTE — PHYSICAL THERAPY INITIAL EVALUATION ADULT - GENERAL OBSERVATIONS, REHAB EVAL
PT 15;00-15;20 pt was seen for PT IE at bed side, pt is agreeable, chart thoroughly reviewed, NP Melita is aware. Pt received and left sitting EOB, in no apparent distress

## 2023-06-08 NOTE — BH TREATMENT PLAN - NSTXDEPRESINTERSW_PSY_ALL_CORE
SW will encourage pt to attend and participate in SW groups during the day despite low mood. SW will provide support and referrals as needed.

## 2023-06-08 NOTE — BH INPATIENT PSYCHIATRY ASSESSMENT NOTE - NSBHCHARTREVIEWVS_PSY_A_CORE FT
Vital Signs Last 24 Hrs  T(C): 35.4 (06-08-23 @ 08:20), Max: 36.3 (06-08-23 @ 05:34)  T(F): 95.8 (06-08-23 @ 08:20), Max: 97.3 (06-08-23 @ 05:34)  HR: 122 (06-08-23 @ 08:20) (110 - 122)  BP: 167/96 (06-08-23 @ 08:20) (146/72 - 167/96)  BP(mean): --  RR: 18 (06-08-23 @ 08:20) (18 - 20)  SpO2: 97% (06-07-23 @ 17:49) (97% - 97%)     Vital Signs Last 24 Hrs  T(C): 36.7 (06-08-23 @ 15:55), Max: 36.7 (06-08-23 @ 15:55)  T(F): 98 (06-08-23 @ 15:55), Max: 98 (06-08-23 @ 15:55)  HR: 111 (06-08-23 @ 15:55) (110 - 122)  BP: 110/68 (06-08-23 @ 15:55) (110/68 - 167/96)  BP(mean): --  RR: 18 (06-08-23 @ 15:55) (18 - 20)  SpO2: 97% (06-07-23 @ 17:49) (97% - 97%)

## 2023-06-08 NOTE — BH INPATIENT PSYCHIATRY ASSESSMENT NOTE - NSBHCHARTREVIEWLAB_PSY_A_CORE FT
14.4   11.69 )-----------( 421      ( 07 Jun 2023 10:40 )             43.9   06-07    136  |  100  |  10  ----------------------------<  276<H>  4.2   |  22  |  0.7    Ca    9.1      07 Jun 2023 10:40    TPro  7.4  /  Alb  4.3  /  TBili  0.3  /  DBili  <0.2  /  AST  27  /  ALT  61<H>  /  AlkPhos  162<H>  06-07

## 2023-06-08 NOTE — BH INPATIENT PSYCHIATRY ASSESSMENT NOTE - DESCRIPTION
domiciled with mother at [facility], currently applying for disability

## 2023-06-08 NOTE — BH SOCIAL WORK INITIAL PSYCHOSOCIAL EVALUATION - LEGAL HELP
Pt's birth control is no longer covered under her insurance  Dr Osman Ji sent a new script over for Pt and she is aware  no

## 2023-06-08 NOTE — BH TREATMENT PLAN - NSTXFALLINTERRN_PSY_ALL_CORE
Reassess fall risk frequently and with change in status   Communicate fall injury risk to all healthcare team members   Anticipate needs  Perform regular intentional rounding to assess need for position change, pain assessment, personal needs and placement of necessary items.   Provide reorientation, appropriate sensory stimulation and routines with changes in mental status to decrease risk of fall.   Promote use of personal vision and auditory aids.   Assess assistance level required for safe and effective self-care  Provide support as needed  Define behavior and activity limits to patient and family.   Encourage to call for help when needed  Encourage the use of walker  Provided with non-skid socks  Refer to PT/OT as needed  Educated about medication side effects  Transferred room near nursing station

## 2023-06-08 NOTE — UM REPORT PROGRESS NOTE - NSUMRMPROVIDER_GEN_A_CORE FT
[employer]  ID# [id number]  [phone]    6/8- Spoke with Laura TORRE from [employer] ([phone]). Approved for 5 days from 6/7-6/11. Review on 6/12. Faxed initial clinicals to [phone]. [employer]  ID# [id number]  [phone]    6/8- Spoke with Laura TORRE from [employer] ([phone]). Approved for 5 days from 6/7-6/11. Review on 6/12. Faxed initial clinicals to [phone].  6-12 clinicals faxed to [employer] awaiting determination AUTH # [id number] [employer]  ID# [id number]  [phone]    6/8- Spoke with Laura TORRE from [employer] ([phone]). Approved for 5 days from 6/7-6/11. Review on 6/12. Faxed initial clinicals to [phone].  6-12 clinicals faxed to [employer] awaiting determination AUTH # [id number]  6-12 clincial emailed to [facility] awaiting determination [employer]  ID# [id number]  [phone]    6/8- Spoke with Laura TORRE from [employer] ([phone]). Approved for 5 days from 6/7-6/11. Review on 6/12. Faxed initial clinicals to [phone].  [facility] AUTH # [id number]  [facility] LCD 6-14. DC planned for 6-13 6-13 DC emailed to [facility]

## 2023-06-08 NOTE — BH INPATIENT PSYCHIATRY ASSESSMENT NOTE - NSBHMEDSOTHERFT_PSY_A_CORE
Ferrous sulfate 325mg daily  Lexapro 20mg daily  Protonix 40mg daily  Lopressor 25mg BID  amitriptyline 75mg QHS  Lamictal 100mg QHS  Albuterol pump q8h prn  Zofran 4mg q8h prn  Motrin prn  Insulin pump

## 2023-06-08 NOTE — BH INPATIENT PSYCHIATRY ASSESSMENT NOTE - OTHER PAST PSYCHIATRIC HISTORY (INCLUDE DETAILS REGARDING ONSET, COURSE OF ILLNESS, INPATIENT/OUTPATIENT TREATMENT)
Multiple previous psychiatric admissions (most recent to [facility] in April 2023)  In outpatient treatment with Dr. Levin (psychiatrist), Kylee (therapist)  Currently on Lexapro and Lamictal  Multiple past suicidal gestures

## 2023-06-08 NOTE — BH INPATIENT PSYCHIATRY ASSESSMENT NOTE - CURRENT MEDICATION
MEDICATIONS  (STANDING):  amitriptyline 75 milliGRAM(s) Oral at bedtime  dextrose 5%. 1000 milliLiter(s) (100 mL/Hr) IV Continuous <Continuous>  dextrose 5%. 1000 milliLiter(s) (50 mL/Hr) IV Continuous <Continuous>  dextrose 50% Injectable 25 Gram(s) IV Push once  dextrose 50% Injectable 25 Gram(s) IV Push once  dextrose 50% Injectable 12.5 Gram(s) IV Push once  escitalopram 20 milliGRAM(s) Oral daily  glucagon  Injectable 1 milliGRAM(s) IntraMuscular once  insulin glargine Injectable (LANTUS) 30 Unit(s) SubCutaneous at bedtime  insulin lispro (ADMELOG) corrective regimen sliding scale   SubCutaneous three times a day before meals  insulin lispro Injectable (ADMELOG) 8 Unit(s) SubCutaneous three times a day before meals  lamoTRIgine 100 milliGRAM(s) Oral daily  metoprolol tartrate 25 milliGRAM(s) Oral two times a day  pantoprazole    Tablet 40 milliGRAM(s) Oral before breakfast    MEDICATIONS  (PRN):  acetaminophen     Tablet .. 650 milliGRAM(s) Oral every 6 hours PRN Temp greater or equal to 38C (100.4F), Mild Pain (1 - 3)  dextrose Oral Gel 15 Gram(s) Oral once PRN Blood Glucose LESS THAN 70 milliGRAM(s)/deciliter  ondansetron    Tablet 4 milliGRAM(s) Oral four times a day PRN N/V

## 2023-06-08 NOTE — BH INPATIENT PSYCHIATRY ASSESSMENT NOTE - NSBHASSESSSUMMFT_PSY_ALL_CORE
Ania Mei is a 38 year old  female, domiciled with mother at [facility], currently applying for disability, PMHx of T1D, diabetic neuropathy, HTN, chronic back pain, migraine headaches, pseudoseizures, PPHx depression/anxiety and cluster B personality disorder, 7 previous psychiatric admissions (most recently discharged from [facility] on 4/25/23), multiple previous suicide attempts (remote hx of once by taking a capful of acetone, another by drinking hydrogen peroxide, another attempt by taking half a bottle of ibuprofen; most recent SA 4-5mo ago by ingesting Pinesol, no known medical consequences), no legal or violence hx, denies substance use, BIBEMS activated by residence facility after patient expressed suicidal ideation.    On assessment, patient continues to report increasing suicidal thoughts over the past few days after thinking about the loss of her father. Patient continues to endorse passive suicidal ideation, and endorses depressive symptoms. Lamictal is being titrated up for further mood stabilization given patient's impulsivity and mood lability. Patient would benefit from further inpatient psychiatric hospitalization for further medication management.     #ARMAAN   #Cluster B Traits   #Depression  - c/w Lexapro 20 mg qd  - c/w lamictal 100 mg qd  - c/w amitriptyline 75mg qd     #Type 1 Diabetes   - DC insulin pump while on inpatient unit  - Lantus 30 at bedtime  - Lispro 8 TID  - Sliding scale insulin    #HTN  - Metoprolol 25 mg BID    #Anemia  - Ferrous sulfate    #Nausea  - PRN Odansetron    #Agitation  -For episodes of acute agitation can offer PO Haldol 5 mg/Ativan 2 mg/Benadryl 50 mg Q6H PRN. If refusing PO and is in acute risk of harm to self/other, can offer IM Haldol 5 mg/Ativan 2 mg/Benadryl 50 mg Q6H PRN. If given, please repeat EKG and hold antipsychotics if QTC>500    Ania Mei is a 38 year old  female, domiciled with mother at [facility], currently applying for disability, PMHx of T1D, diabetic neuropathy, HTN, chronic back pain, migraine headaches, pseudoseizures, PPHx depression/anxiety and cluster B personality disorder, 7 previous psychiatric admissions (most recently discharged from [facility] on 4/25/23), multiple previous suicide attempts (remote hx of once by taking a capful of acetone, another by drinking hydrogen peroxide, another attempt by taking half a bottle of ibuprofen; most recent SA 4-5mo ago by ingesting Pinesol, no known medical consequences), no legal or violence hx, denies substance use, BIBEMS activated by residence facility after patient expressed suicidal ideation.    On assessment, patient continues to report increasing suicidal thoughts over the past few days after thinking about the loss of her father. Patient continues to endorse passive suicidal ideation, and endorses depressive symptoms. Lamictal is being titrated up for further mood stabilization given patient's impulsivity and mood lability. Patient would benefit from further inpatient psychiatric hospitalization for further medication management.     #ARMAAN   #Cluster B Traits   #Depression  - c/w Lexapro 20 mg qd  - increase lamictal 100 mg qd to 150mg qhs  - c/w amitriptyline 75mg qd     #Type 1 Diabetes   - DC insulin pump while on inpatient unit  - Lantus 30 at bedtime  - Lispro 8 TID  - Sliding scale insulin    #HTN  - Metoprolol 25 mg BID    #Anemia  - Ferrous sulfate    #Nausea  - PRN Odansetron    #Agitation  -For episodes of acute agitation can offer PO Haldol 5 mg/Ativan 2 mg/Benadryl 50 mg Q6H PRN. If refusing PO and is in acute risk of harm to self/other, can offer IM Haldol 5 mg/Ativan 2 mg/Benadryl 50 mg Q6H PRN. If given, please repeat EKG and hold antipsychotics if QTC>500

## 2023-06-08 NOTE — BH TREATMENT PLAN - NSTXPLANTHERAPYSESSIONSFT_PSY_ALL_CORE
06-08-23  Type of therapy: Coping skills, Creative arts therapy, Self esteem, Stress management  Type of session: Individual  Level of patient participation: Resistance to participation  Duration of participation: Less than 15 minutes  Therapy conducted by: Psych rehab  Therapy Summary: Pt will need increased support and encouragement to attend RT sessions .

## 2023-06-08 NOTE — BH INPATIENT PSYCHIATRY ASSESSMENT NOTE - RISK ASSESSMENT
Protective factors include social support/family connectiveness, Holiness, seeking out treatment/hopefullness.   Modifiable risk factors include SI/I/P, current or past psychiatric illness, impulsivity, hopelessness, recent psychosocial stressors, medical illness/pain, single, unemployment.  Static risk factors include  race, prior SA

## 2023-06-08 NOTE — BH INPATIENT PSYCHIATRY ASSESSMENT NOTE - NSBHCHARTREVIEWINVESTIGATE_PSY_A_CORE FT
Ventricular Rate 116 BPM    Atrial Rate 116 BPM    P-R Interval 130 ms    QRS Duration 88 ms    Q-T Interval 338 ms    QTC Calculation(Bazett) 469 ms    P Axis 37 degrees    R Axis 97 degrees    T Axis 48 degrees    Diagnosis Line Sinus tachycardia  Possible Left atrial enlargement  Rightward axis  Pulmonary disease pattern  Abnormal ECG    Confirmed by ANTONIO HAWTHORNE MD ([id number]) on 6/7/2023 12:05:35 PM

## 2023-06-08 NOTE — PHYSICAL THERAPY INITIAL EVALUATION ADULT - ADDITIONAL COMMENTS
pt lives with her mother in an assisted living facility at [facility] ([place]), no stairs.  Pt amb with RW

## 2023-06-08 NOTE — CONSULT NOTE ADULT - ASSESSMENT
MEDICATIONS  (STANDING):  amitriptyline 75 milliGRAM(s) Oral at bedtime  dextrose 5%. 1000 milliLiter(s) (50 mL/Hr) IV Continuous <Continuous>  dextrose 5%. 1000 milliLiter(s) (100 mL/Hr) IV Continuous <Continuous>  dextrose 50% Injectable 25 Gram(s) IV Push once  dextrose 50% Injectable 25 Gram(s) IV Push once  dextrose 50% Injectable 12.5 Gram(s) IV Push once  escitalopram 20 milliGRAM(s) Oral daily  glucagon  Injectable 1 milliGRAM(s) IntraMuscular once  insulin glargine Injectable (LANTUS) 30 Unit(s) SubCutaneous at bedtime  insulin lispro (ADMELOG) corrective regimen sliding scale   SubCutaneous three times a day before meals  insulin lispro Injectable (ADMELOG) 8 Unit(s) SubCutaneous three times a day before meals  lamoTRIgine 150 milliGRAM(s) Oral at bedtime  metoprolol tartrate 25 milliGRAM(s) Oral two times a day  pantoprazole    Tablet 40 milliGRAM(s) Oral before breakfast    MEDICATIONS  (PRN):  acetaminophen     Tablet .. 650 milliGRAM(s) Oral every 6 hours PRN Temp greater or equal to 38C (100.4F), Mild Pain (1 - 3)  dextrose Oral Gel 15 Gram(s) Oral once PRN Blood Glucose LESS THAN 70 milliGRAM(s)/deciliter  haloperidol     Tablet 5 milliGRAM(s) Oral every 8 hours PRN physical aggression  hydrOXYzine hydrochloride 50 milliGRAM(s) Oral every 6 hours PRN anxiety  LORazepam     Tablet 2 milliGRAM(s) Oral every 8 hours PRN agitation  ondansetron    Tablet 4 milliGRAM(s) Oral four times a day PRN N/V      ASSESSMENT AND PLAN   PRINCIPAL PROBLEM  DMT1 with hyperglycemia  - consider to increase lantus to 35 units HS   - cont lispro premeal bolus insulin 8 units AC  - Patient's insulin pump held     ACTIVE PROBLEMS  Sinus tachycardia likely r/w anxiety   - pls change metoprolol back to 25 mg twice daily     Obesity BMI 32   - wt loss counseling per PCP    GERD with epigastric chest pain  - cont ppi, consider prn TUMs or mylanta   - Chest X-Ray negative     s/p fall  - amubalting without difficulty, no head injury     Recurrent depression with SI  - mgmt per psychiatry   - CBT per psych   - cont elavil, lamictal, and lexapro , prn haldol and ativan      CHRONIC PROBLEMS  DDD-T, spinal stenosis with sciatica.   - stable, consider prn flexeril , prn tylenol     Hx/o Cluster B personality disorder  - mgmt per psychiatry.     Chronic migraines  - cont elavil     DVT/GI px  -ambulatory/ppi     FULL CODE     Will sign off, pls call prn , thank you for allowing us to participate in the care of this patient.

## 2023-06-08 NOTE — BH INPATIENT PSYCHIATRY ASSESSMENT NOTE - NSBHCONSBHPROVDETAILS_PSY_A_CORE  FT
Spoke with Dr. Levin Spoke with Dr. Levin; patient has been going to app, no acute complaints, no suicidal ideations reported. Patient typically has social stressor involving mother or boyfriend leading to suicide attempts or suicidal ideations as in past, however, this admission is unique in that it involves patient's father. Plan outpt was to increase lamictal for further mood stabilization, which can be done on the unit this admission.

## 2023-06-08 NOTE — BH INPATIENT PSYCHIATRY ASSESSMENT NOTE - ADDITIONAL DETAILS ALL
Patiently currently denies any current active suicidal ideation, intent or plan but does endorse passive suicidal ideation. Patient arrived to ED and expressed active suicidal ideation, with plan to drink laundry detergent. Multiple previous attempts as above.

## 2023-06-09 ENCOUNTER — APPOINTMENT (OUTPATIENT)
Dept: PSYCHIATRY | Facility: CLINIC | Age: 38
End: 2023-06-09

## 2023-06-09 LAB
GLUCOSE BLDC GLUCOMTR-MCNC: 199 MG/DL — HIGH (ref 70–99)
GLUCOSE BLDC GLUCOMTR-MCNC: 295 MG/DL — HIGH (ref 70–99)
GLUCOSE BLDC GLUCOMTR-MCNC: 321 MG/DL — HIGH (ref 70–99)
GLUCOSE BLDC GLUCOMTR-MCNC: 339 MG/DL — HIGH (ref 70–99)

## 2023-06-09 RX ORDER — INSULIN GLARGINE 100 [IU]/ML
35 INJECTION, SOLUTION SUBCUTANEOUS AT BEDTIME
Refills: 0 | Status: DISCONTINUED | OUTPATIENT
Start: 2023-06-09 | End: 2023-06-13

## 2023-06-09 RX ADMIN — ESCITALOPRAM OXALATE 20 MILLIGRAM(S): 10 TABLET, FILM COATED ORAL at 11:27

## 2023-06-09 RX ADMIN — INSULIN GLARGINE 35 UNIT(S): 100 INJECTION, SOLUTION SUBCUTANEOUS at 20:15

## 2023-06-09 RX ADMIN — Medication 3: at 07:47

## 2023-06-09 RX ADMIN — Medication 8 UNIT(S): at 16:24

## 2023-06-09 RX ADMIN — Medication 25 MILLIGRAM(S): at 08:27

## 2023-06-09 RX ADMIN — Medication 75 MILLIGRAM(S): at 20:15

## 2023-06-09 RX ADMIN — PANTOPRAZOLE SODIUM 40 MILLIGRAM(S): 20 TABLET, DELAYED RELEASE ORAL at 08:27

## 2023-06-09 RX ADMIN — LAMOTRIGINE 150 MILLIGRAM(S): 25 TABLET, ORALLY DISINTEGRATING ORAL at 20:15

## 2023-06-09 RX ADMIN — Medication 8 UNIT(S): at 11:27

## 2023-06-09 RX ADMIN — Medication 8 UNIT(S): at 07:46

## 2023-06-09 RX ADMIN — Medication 1: at 16:24

## 2023-06-09 RX ADMIN — Medication 25 MILLIGRAM(S): at 20:15

## 2023-06-09 RX ADMIN — Medication 4: at 11:28

## 2023-06-10 LAB
GLUCOSE BLDC GLUCOMTR-MCNC: 161 MG/DL — HIGH (ref 70–99)
GLUCOSE BLDC GLUCOMTR-MCNC: 191 MG/DL — HIGH (ref 70–99)
GLUCOSE BLDC GLUCOMTR-MCNC: 275 MG/DL — HIGH (ref 70–99)
GLUCOSE BLDC GLUCOMTR-MCNC: 303 MG/DL — HIGH (ref 70–99)

## 2023-06-10 PROCEDURE — 99232 SBSQ HOSP IP/OBS MODERATE 35: CPT

## 2023-06-10 RX ORDER — IBUPROFEN 200 MG
600 TABLET ORAL EVERY 8 HOURS
Refills: 0 | Status: DISCONTINUED | OUTPATIENT
Start: 2023-06-10 | End: 2023-06-13

## 2023-06-10 RX ORDER — ACETAMINOPHEN 500 MG
975 TABLET ORAL EVERY 8 HOURS
Refills: 0 | Status: DISCONTINUED | OUTPATIENT
Start: 2023-06-10 | End: 2023-06-13

## 2023-06-10 RX ADMIN — Medication 8 UNIT(S): at 16:13

## 2023-06-10 RX ADMIN — Medication 1: at 16:14

## 2023-06-10 RX ADMIN — PANTOPRAZOLE SODIUM 40 MILLIGRAM(S): 20 TABLET, DELAYED RELEASE ORAL at 07:20

## 2023-06-10 RX ADMIN — Medication 8 UNIT(S): at 12:04

## 2023-06-10 RX ADMIN — Medication 3: at 07:21

## 2023-06-10 RX ADMIN — INSULIN GLARGINE 35 UNIT(S): 100 INJECTION, SOLUTION SUBCUTANEOUS at 20:03

## 2023-06-10 RX ADMIN — LAMOTRIGINE 150 MILLIGRAM(S): 25 TABLET, ORALLY DISINTEGRATING ORAL at 20:03

## 2023-06-10 RX ADMIN — Medication 75 MILLIGRAM(S): at 20:03

## 2023-06-10 RX ADMIN — Medication 4: at 12:05

## 2023-06-10 RX ADMIN — Medication 8 UNIT(S): at 07:21

## 2023-06-10 RX ADMIN — Medication 25 MILLIGRAM(S): at 12:04

## 2023-06-10 RX ADMIN — Medication 650 MILLIGRAM(S): at 16:14

## 2023-06-10 RX ADMIN — ESCITALOPRAM OXALATE 20 MILLIGRAM(S): 10 TABLET, FILM COATED ORAL at 12:04

## 2023-06-10 RX ADMIN — Medication 25 MILLIGRAM(S): at 20:03

## 2023-06-11 LAB
GLUCOSE BLDC GLUCOMTR-MCNC: 222 MG/DL — HIGH (ref 70–99)
GLUCOSE BLDC GLUCOMTR-MCNC: 233 MG/DL — HIGH (ref 70–99)
GLUCOSE BLDC GLUCOMTR-MCNC: 285 MG/DL — HIGH (ref 70–99)
GLUCOSE BLDC GLUCOMTR-MCNC: 397 MG/DL — HIGH (ref 70–99)

## 2023-06-11 RX ADMIN — Medication 8 UNIT(S): at 11:24

## 2023-06-11 RX ADMIN — Medication 3: at 07:21

## 2023-06-11 RX ADMIN — Medication 8 UNIT(S): at 16:40

## 2023-06-11 RX ADMIN — PANTOPRAZOLE SODIUM 40 MILLIGRAM(S): 20 TABLET, DELAYED RELEASE ORAL at 07:23

## 2023-06-11 RX ADMIN — ESCITALOPRAM OXALATE 20 MILLIGRAM(S): 10 TABLET, FILM COATED ORAL at 11:26

## 2023-06-11 RX ADMIN — Medication 25 MILLIGRAM(S): at 07:25

## 2023-06-11 RX ADMIN — LAMOTRIGINE 150 MILLIGRAM(S): 25 TABLET, ORALLY DISINTEGRATING ORAL at 21:09

## 2023-06-11 RX ADMIN — Medication 8 UNIT(S): at 07:20

## 2023-06-11 RX ADMIN — INSULIN GLARGINE 35 UNIT(S): 100 INJECTION, SOLUTION SUBCUTANEOUS at 20:02

## 2023-06-11 RX ADMIN — Medication 975 MILLIGRAM(S): at 11:31

## 2023-06-11 RX ADMIN — Medication 2: at 16:40

## 2023-06-11 RX ADMIN — Medication 5: at 11:25

## 2023-06-11 RX ADMIN — Medication 75 MILLIGRAM(S): at 21:09

## 2023-06-11 RX ADMIN — Medication 25 MILLIGRAM(S): at 21:09

## 2023-06-11 NOTE — ED ADULT TRIAGE NOTE - NS ED TRIAGE AVPU SCALE
2
Alert-The patient is alert, awake and responds to voice. The patient is oriented to time, place, and person. The triage nurse is able to obtain subjective information.

## 2023-06-12 ENCOUNTER — APPOINTMENT (OUTPATIENT)
Dept: PSYCHIATRY | Facility: CLINIC | Age: 38
End: 2023-06-12

## 2023-06-12 ENCOUNTER — NON-APPOINTMENT (OUTPATIENT)
Age: 38
End: 2023-06-12

## 2023-06-12 LAB
GLUCOSE BLDC GLUCOMTR-MCNC: 238 MG/DL — HIGH (ref 70–99)
GLUCOSE BLDC GLUCOMTR-MCNC: 334 MG/DL — HIGH (ref 70–99)
GLUCOSE BLDC GLUCOMTR-MCNC: 421 MG/DL — HIGH (ref 70–99)

## 2023-06-12 RX ORDER — ESCITALOPRAM OXALATE 10 MG/1
1 TABLET, FILM COATED ORAL
Qty: 14 | Refills: 0
Start: 2023-06-12 | End: 2023-06-25

## 2023-06-12 RX ORDER — METOPROLOL TARTRATE 50 MG
1 TABLET ORAL
Qty: 28 | Refills: 0
Start: 2023-06-12 | End: 2023-06-25

## 2023-06-12 RX ORDER — AMITRIPTYLINE HCL 25 MG
1 TABLET ORAL
Qty: 14 | Refills: 0
Start: 2023-06-12 | End: 2023-06-25

## 2023-06-12 RX ORDER — LAMOTRIGINE 25 MG/1
1 TABLET, ORALLY DISINTEGRATING ORAL
Qty: 14 | Refills: 0
Start: 2023-06-12 | End: 2023-06-25

## 2023-06-12 RX ORDER — HYDROXYZINE HCL 10 MG
1 TABLET ORAL
Qty: 56 | Refills: 0
Start: 2023-06-12 | End: 2023-06-25

## 2023-06-12 RX ORDER — ACETAMINOPHEN 500 MG
3 TABLET ORAL
Qty: 0 | Refills: 0 | DISCHARGE
Start: 2023-06-12

## 2023-06-12 RX ORDER — ACETAMINOPHEN 500 MG
1 TABLET ORAL
Qty: 42 | Refills: 0
Start: 2023-06-12 | End: 2023-06-25

## 2023-06-12 RX ORDER — INSULIN LISPRO 100/ML
6 VIAL (ML) SUBCUTANEOUS ONCE
Refills: 0 | Status: COMPLETED | OUTPATIENT
Start: 2023-06-12 | End: 2023-06-12

## 2023-06-12 RX ORDER — PANTOPRAZOLE SODIUM 20 MG/1
1 TABLET, DELAYED RELEASE ORAL
Qty: 0 | Refills: 0 | DISCHARGE
Start: 2023-06-12

## 2023-06-12 RX ADMIN — Medication 25 MILLIGRAM(S): at 08:07

## 2023-06-12 RX ADMIN — Medication 8 UNIT(S): at 11:26

## 2023-06-12 RX ADMIN — Medication 8 UNIT(S): at 07:04

## 2023-06-12 RX ADMIN — Medication 2: at 07:04

## 2023-06-12 RX ADMIN — INSULIN GLARGINE 35 UNIT(S): 100 INJECTION, SOLUTION SUBCUTANEOUS at 20:12

## 2023-06-12 RX ADMIN — Medication 75 MILLIGRAM(S): at 20:14

## 2023-06-12 RX ADMIN — LAMOTRIGINE 150 MILLIGRAM(S): 25 TABLET, ORALLY DISINTEGRATING ORAL at 20:13

## 2023-06-12 RX ADMIN — Medication 2: at 16:23

## 2023-06-12 RX ADMIN — Medication 4: at 11:27

## 2023-06-12 RX ADMIN — Medication 25 MILLIGRAM(S): at 20:13

## 2023-06-12 RX ADMIN — PANTOPRAZOLE SODIUM 40 MILLIGRAM(S): 20 TABLET, DELAYED RELEASE ORAL at 06:12

## 2023-06-12 RX ADMIN — Medication 8 UNIT(S): at 16:23

## 2023-06-12 RX ADMIN — Medication 6 UNIT(S): at 20:13

## 2023-06-12 RX ADMIN — ESCITALOPRAM OXALATE 20 MILLIGRAM(S): 10 TABLET, FILM COATED ORAL at 08:07

## 2023-06-12 NOTE — BH INPATIENT PSYCHIATRY PROGRESS NOTE - NSICDXBHSECONDARYDX_PSY_ALL_CORE
Cluster B personality disorder   F60.89  

## 2023-06-12 NOTE — BH INPATIENT PSYCHIATRY DISCHARGE NOTE - HOSPITAL COURSE
Ania Mei is a 38 year old  female, domiciled with mother at [facility], currently applying for disability, PMHx of T1D, diabetic neuropathy, HTN, chronic back pain, migraine headaches, pseudoseizures, PPHx depression/anxiety and cluster B personality disorder, 7 previous psychiatric admissions (most recently discharged from [facility] on 4/25/23), multiple previous suicide attempts (remote hx of once by taking a capful of acetone, another by drinking hydrogen peroxide, another attempt by taking half a bottle of ibuprofen; most recent SA 4-5mo ago by ingesting Pinesol, no known medical consequences), no legal or violence hx, denies substance use, BIBEMS activated by residence facility after patient expressed suicidal ideation.     Pt was seen, evaluated, chart reviewed.  As per nursing staff, no events overnight. On evaluation, pt reports that she is doing well. Offered no new complaints. Is compliant with medication, denies negative side effects. Endorsed good sleep and appetite. Denied A/V hallucinations. Denied paranoia. Denied suicidal/homicidal ideation, intent or plan. Pt is for discharge today. Agreeable with outpatient follow up.     6-9-23: increased lamictal 100mg to 150mg qhs; increased lantus 30mg qhs to 35mg qhs    #ARMAAN   #Cluster B Traits   #Depression  - c/w Lexapro 20 mg qd  - c/w lamictal 150mg qhs  - c/w amitriptyline 75mg qd        Ania Mei is a 38 year old  female, domiciled with mother at [facility], currently applying for disability, PMHx of T1D, diabetic neuropathy, HTN, chronic back pain, migraine headaches, pseudoseizures, PPHx depression/anxiety and cluster B personality disorder, 7 previous psychiatric admissions (most recently discharged from [facility] on 4/25/23), multiple previous suicide attempts (remote hx of once by taking a capful of acetone, another by drinking hydrogen peroxide, another attempt by taking half a bottle of ibuprofen; most recent SA 4-5mo ago by ingesting Pinesol, no known medical consequences), no legal or violence hx, denies substance use, BIBEMS activated by residence facility after patient expressed suicidal ideation.     Pt was seen, evaluated, chart reviewed.  As per nursing staff, no events overnight. On evaluation, pt reports that she is doing well. Offered no new complaints. Is compliant with medication, denies negative side effects. Endorsed good sleep and appetite. Denied passive or active suicidal ideation, intent or plan. Pt is for discharge today. Agreeable with outpatient follow up.     6-9-23: increased lamictal 100mg to 150mg qhs; increased lantus 30mg qhs to 35mg qhs    #ARMAAN   #Cluster B Traits   #Depression  - c/w Lexapro 20 mg qd  - c/w lamictal 150mg qhs  - c/w amitriptyline 75mg qd

## 2023-06-12 NOTE — BH INPATIENT PSYCHIATRY PROGRESS NOTE - NSBHCHARTREVIEWINVESTIGATE_PSY_A_CORE FT
Ventricular Rate 116 BPM    Atrial Rate 116 BPM    P-R Interval 130 ms    QRS Duration 88 ms    Q-T Interval 338 ms    QTC Calculation(Bazett) 469 ms    P Axis 37 degrees    R Axis 97 degrees    T Axis 48 degrees    Diagnosis Line Sinus tachycardia  Possible Left atrial enlargement  Rightward axis  Pulmonary disease pattern  Abnormal ECG    Confirmed by ANTONIO HAWTHORNE MD ([id number]) on 6/7/2023 12:05:35 PM
Ventricular Rate 116 BPM    Atrial Rate 116 BPM    P-R Interval 130 ms    QRS Duration 88 ms    Q-T Interval 338 ms    QTC Calculation(Bazett) 469 ms    P Axis 37 degrees    R Axis 97 degrees    T Axis 48 degrees    Diagnosis Line Sinus tachycardia  Possible Left atrial enlargement  Rightward axis  Pulmonary disease pattern  Abnormal ECG    Confirmed by ANTONIO HAWTHORNE MD [id number]) on 6/7/2023 12:05:35 PM
Ventricular Rate 116 BPM    Atrial Rate 116 BPM    P-R Interval 130 ms    QRS Duration 88 ms    Q-T Interval 338 ms    QTC Calculation(Bazett) 469 ms    P Axis 37 degrees    R Axis 97 degrees    T Axis 48 degrees    Diagnosis Line Sinus tachycardia  Possible Left atrial enlargement  Rightward axis  Pulmonary disease pattern  Abnormal ECG    Confirmed by ANTONIO HAWTHORNE MD [id number]) on 6/7/2023 12:05:35 PM
Ventricular Rate 116 BPM    Atrial Rate 116 BPM    P-R Interval 130 ms    QRS Duration 88 ms    Q-T Interval 338 ms    QTC Calculation(Bazett) 469 ms    P Axis 37 degrees    R Axis 97 degrees    T Axis 48 degrees    Diagnosis Line Sinus tachycardia  Possible Left atrial enlargement  Rightward axis  Pulmonary disease pattern  Abnormal ECG    Confirmed by ANTONIO HAWTHORNE MD [id number]) on 6/7/2023 12:05:35 PM

## 2023-06-12 NOTE — BH INPATIENT PSYCHIATRY PROGRESS NOTE - NSBHMETABOLIC_PSY_ALL_CORE_FT
BMI: BMI (kg/m2): 32.7 (06-07-23 @ 19:06)  HbA1c: A1C with Estimated Average Glucose Result: 10.7 % (06-08-23 @ 07:00)    Glucose: POCT Blood Glucose.: 339 mg/dL (06-09-23 @ 11:03)    BP: 140/69 (06-09-23 @ 07:43) (110/68 - 167/96)  Lipid Panel: 
BMI: BMI (kg/m2): 32.7 (06-07-23 @ 19:06)  HbA1c: A1C with Estimated Average Glucose Result: 10.7 % (06-08-23 @ 07:00)    Glucose: POCT Blood Glucose.: 397 mg/dL (06-11-23 @ 11:12)    BP: 157/76 (06-11-23 @ 09:07) (110/68 - 157/76)  Lipid Panel: 
BMI: BMI (kg/m2): 32.7 (06-07-23 @ 19:06)  HbA1c: A1C with Estimated Average Glucose Result: 10.7 % (06-08-23 @ 07:00)    Glucose: POCT Blood Glucose.: 334 mg/dL (06-12-23 @ 11:24)    BP: 151/77 (06-12-23 @ 09:10) (118/82 - 157/76)  Lipid Panel: 
BMI: BMI (kg/m2): 32.7 (06-07-23 @ 19:06)  HbA1c: A1C with Estimated Average Glucose Result: 10.7 % (06-08-23 @ 07:00)    Glucose: POCT Blood Glucose.: 161 mg/dL (06-10-23 @ 15:50)    BP: 124/58 (06-10-23 @ 15:31) (110/68 - 167/96)  Lipid Panel:

## 2023-06-12 NOTE — BH INPATIENT PSYCHIATRY PROGRESS NOTE - NSBHFUPINTERVALHXFT_PSY_A_CORE
Met with patient  who was lying in bed facing away from the writer and resident.   She reported her back pain was severe and that it was difficult for her to move around.  We discussed the issue of mobility and writer agreed to verify as needed none narcotic pain meds and her regimen.   Seen later when she was sitting up in bed she was pleasant to the writer and said she was feeling better.
  chart reviewed , discussed with  staff , patient evaluated.  No  cute event overnight.   c/o back pain , ambulatory.  not in any distress.   she is compliant with medications.   mod 'ok "   doing relatively well.      denies s/h ideations intent or plan. denies a/v hallucination .   denies sleep or appetite problem. 
Patient was seen and evaluated this morning. Chart was reviewed and no acute events were noted. No PRN medications were administered overnight. Upon approach, patient is lying in bed comfortably. Patient is alert and oriented and engages with interviewer. Patient is cooperative and answers all questions appropriately. Patient reports that weekend was good, but yesterday was a little rough because she was thinking about her father who passed way 6 months ago. Patient denies any current passive or active suicidal ideation, intent or plan. Feels safe on unit. Denies any side effects from medications. Reports improving symptoms of depression, specifically with sleep, appetite, mood. 
Patient was seen and evaluated this morning. Chart was reviewed and no acute events were noted. No PRN medications were administered overnight. Upon approach, patient is lying in bed comfortably. Patient would not wake up, was breathing, would clench eyes closed when tried to open, would let arm fall to gravity on her side but later got up and went to the phone to call her mother. Patient is alert and oriented and engages with interviewer. Patient is cooperative and answers all questions appropriately. Patient is concerned that she was given celexa today. She is informed it was not celexa and we have kept her medications the same aside from increase to lamictal. Patient reports she was triggered during group today where another patient mentioned liver cancer, which is what her father passed away from. Patient reports she still feels anxious, but does not have any suicidal ideations at this time. Slept well, appetite good.    Spoke with patient and patient's mother. There was concern that patient needs further outpatient services as she has not gone to her therapist appointments in a while. Concerns were alleviated and patient and mother ensured that proper outpatient services would in fact be established

## 2023-06-12 NOTE — BH INPATIENT PSYCHIATRY PROGRESS NOTE - NSBHATTESTCOMMENTATTENDFT_PSY_A_CORE
Discussed clinical issues regarding patient's treatment with resident.  I concur with the above findings and plan.
Clinical aspects of case discussed with resident.  I concur with findings and plan.
Case discussed with resident.  I concur with findings and plan

## 2023-06-12 NOTE — BH INPATIENT PSYCHIATRY PROGRESS NOTE - NSBHMSETHTCONTENT_PSY_A_CORE
Reports feeling suicidal with plan to drink detergent./Hopelessness
Reports feeling suicidal with plan to drink detergent./Hopelessness
Reports feeling suicidal with plan to drink detergent./Hopelessness/Other
Reports feeling suicidal with plan to drink detergent./Hopelessness

## 2023-06-12 NOTE — BH INPATIENT PSYCHIATRY DISCHARGE NOTE - NSBHMETABOLIC_PSY_ALL_CORE_FT
BMI: BMI (kg/m2): 32.7 (06-07-23 @ 19:06)  HbA1c: A1C with Estimated Average Glucose Result: 10.7 % (06-08-23 @ 07:00)    Glucose: POCT Blood Glucose.: 334 mg/dL (06-12-23 @ 11:24)    BP: 151/77 (06-12-23 @ 09:10) (118/82 - 157/76)  Lipid Panel:

## 2023-06-12 NOTE — BH INPATIENT PSYCHIATRY PROGRESS NOTE - NSBHCHARTREVIEWVS_PSY_A_CORE FT
Vital Signs Last 24 Hrs  T(C): 36.2 (06-09-23 @ 07:43), Max: 36.7 (06-08-23 @ 15:55)  T(F): 97.2 (06-09-23 @ 07:43), Max: 98 (06-08-23 @ 15:55)  HR: 111 (06-09-23 @ 07:43) (111 - 111)  BP: 140/69 (06-09-23 @ 07:43) (110/68 - 140/69)  BP(mean): --  RR: 16 (06-09-23 @ 07:43) (16 - 18)  SpO2: --    
Vital Signs Last 24 Hrs  T(C): 36 (06-12-23 @ 09:10), Max: 36 (06-12-23 @ 09:10)  T(F): 96.8 (06-12-23 @ 09:10), Max: 96.8 (06-12-23 @ 09:10)  HR: 111 (06-12-23 @ 09:10) (105 - 111)  BP: 151/77 (06-12-23 @ 09:10) (151/77 - 151/90)  BP(mean): --  RR: 16 (06-12-23 @ 09:10) (16 - 20)  SpO2: --    
Vital Signs Last 24 Hrs  T(C): 35.7 (06-11-23 @ 09:07), Max: 36.1 (06-10-23 @ 15:31)  T(F): 96.2 (06-11-23 @ 09:07), Max: 97 (06-10-23 @ 15:31)  HR: 117 (06-11-23 @ 09:07) (89 - 117)  BP: 157/76 (06-11-23 @ 09:07) (124/58 - 157/76)  BP(mean): --  RR: 16 (06-11-23 @ 09:07) (16 - 16)  SpO2: --    
Vital Signs Last 24 Hrs  T(C): 36.1 (10 Faustino 2023 15:31), Max: 36.1 (10 Faustino 2023 15:31)  T(F): 97 (10 Faustino 2023 15:31), Max: 97 (10 Faustino 2023 15:31)  HR: 89 (10 Faustino 2023 15:31) (89 - 125)  BP: 124/58 (10 Faustino 2023 15:31) (118/82 - 125/91)  BP(mean): --  RR: 16 (10 Faustino 2023 15:31) (16 - 18)  SpO2: --

## 2023-06-12 NOTE — BH INPATIENT PSYCHIATRY DISCHARGE NOTE - NSDCMRMEDTOKEN_GEN_ALL_CORE_FT
acetaminophen 325 mg oral tablet: 1 tab(s) orally every 8 hours as needed for Severe Pain (7 - 10) MDD: 975 mg  albuterol 90 mcg/inh inhalation powder: 1 puff(s) inhaled every 6 hours as needed for  bronchospasm  amitriptyline 75 mg oral tablet: 1 tab(s) orally once a day (at bedtime) MDD: 75 mg  escitalopram 20 mg oral tablet: 1 tab(s) orally once a day MDD: 20 mg  ferrous sulfate 325 mg (65 mg elemental iron) oral tablet: 1 tab(s) orally once a day x 14 days Continue to take as prescribed until told otherwise by your provider  hydrOXYzine hydrochloride 50 mg oral tablet: 1 tab(s) orally every 6 hours as needed for anxiety and pseudoseizures Continue to take as prescribed until told otherwise by your provider MDD: 200 mg  lamoTRIgine 150 mg oral tablet: 1 tab(s) orally once a day (at bedtime) MDD: 150 mg  metoprolol tartrate 25 mg oral tablet: 1 tab(s) orally 2 times a day Continue to take as prescribed until told otherwise by your provider MDD: 50 mg  ondansetron 4 mg oral tablet, disintegratin tab(s) orally 3 times a day x 14 days as needed for  nausea Continue to take as prescribed until told otherwise by your provider  pantoprazole 40 mg oral delayed release tablet: 1 tab(s) orally once a day (before a meal)

## 2023-06-12 NOTE — BH INPATIENT PSYCHIATRY PROGRESS NOTE - CURRENT MEDICATION
MEDICATIONS  (STANDING):  amitriptyline 75 milliGRAM(s) Oral at bedtime  dextrose 5%. 1000 milliLiter(s) (50 mL/Hr) IV Continuous <Continuous>  dextrose 5%. 1000 milliLiter(s) (100 mL/Hr) IV Continuous <Continuous>  dextrose 50% Injectable 25 Gram(s) IV Push once  dextrose 50% Injectable 25 Gram(s) IV Push once  dextrose 50% Injectable 12.5 Gram(s) IV Push once  escitalopram 20 milliGRAM(s) Oral daily  glucagon  Injectable 1 milliGRAM(s) IntraMuscular once  insulin glargine Injectable (LANTUS) 35 Unit(s) SubCutaneous at bedtime  insulin lispro (ADMELOG) corrective regimen sliding scale   SubCutaneous three times a day before meals  insulin lispro Injectable (ADMELOG) 8 Unit(s) SubCutaneous three times a day before meals  lamoTRIgine 150 milliGRAM(s) Oral at bedtime  metoprolol tartrate 25 milliGRAM(s) Oral two times a day  pantoprazole    Tablet 40 milliGRAM(s) Oral before breakfast    MEDICATIONS  (PRN):  acetaminophen     Tablet .. 975 milliGRAM(s) Oral every 8 hours PRN Severe Pain (7 - 10)  dextrose Oral Gel 15 Gram(s) Oral once PRN Blood Glucose LESS THAN 70 milliGRAM(s)/deciliter  haloperidol     Tablet 5 milliGRAM(s) Oral every 8 hours PRN physical aggression  hydrOXYzine hydrochloride 50 milliGRAM(s) Oral every 6 hours PRN anxiety  ibuprofen  Tablet. 600 milliGRAM(s) Oral every 8 hours PRN Severe Pain (7 - 10)  LORazepam     Tablet 2 milliGRAM(s) Oral every 8 hours PRN agitation  ondansetron    Tablet 4 milliGRAM(s) Oral four times a day PRN N/V  
MEDICATIONS  (STANDING):  amitriptyline 75 milliGRAM(s) Oral at bedtime  dextrose 5%. 1000 milliLiter(s) (100 mL/Hr) IV Continuous <Continuous>  dextrose 5%. 1000 milliLiter(s) (50 mL/Hr) IV Continuous <Continuous>  dextrose 50% Injectable 25 Gram(s) IV Push once  dextrose 50% Injectable 12.5 Gram(s) IV Push once  dextrose 50% Injectable 25 Gram(s) IV Push once  escitalopram 20 milliGRAM(s) Oral daily  glucagon  Injectable 1 milliGRAM(s) IntraMuscular once  insulin glargine Injectable (LANTUS) 35 Unit(s) SubCutaneous at bedtime  insulin lispro (ADMELOG) corrective regimen sliding scale   SubCutaneous three times a day before meals  insulin lispro Injectable (ADMELOG) 8 Unit(s) SubCutaneous three times a day before meals  lamoTRIgine 150 milliGRAM(s) Oral at bedtime  metoprolol tartrate 25 milliGRAM(s) Oral two times a day  pantoprazole    Tablet 40 milliGRAM(s) Oral before breakfast    MEDICATIONS  (PRN):  acetaminophen     Tablet .. 975 milliGRAM(s) Oral every 8 hours PRN Severe Pain (7 - 10)  dextrose Oral Gel 15 Gram(s) Oral once PRN Blood Glucose LESS THAN 70 milliGRAM(s)/deciliter  haloperidol     Tablet 5 milliGRAM(s) Oral every 8 hours PRN physical aggression  hydrOXYzine hydrochloride 50 milliGRAM(s) Oral every 6 hours PRN anxiety  ibuprofen  Tablet. 600 milliGRAM(s) Oral every 8 hours PRN Severe Pain (7 - 10)  LORazepam     Tablet 2 milliGRAM(s) Oral every 8 hours PRN agitation  ondansetron    Tablet 4 milliGRAM(s) Oral four times a day PRN N/V  
MEDICATIONS  (STANDING):  amitriptyline 75 milliGRAM(s) Oral at bedtime  dextrose 5%. 1000 milliLiter(s) (50 mL/Hr) IV Continuous <Continuous>  dextrose 5%. 1000 milliLiter(s) (100 mL/Hr) IV Continuous <Continuous>  dextrose 50% Injectable 25 Gram(s) IV Push once  dextrose 50% Injectable 12.5 Gram(s) IV Push once  dextrose 50% Injectable 25 Gram(s) IV Push once  escitalopram 20 milliGRAM(s) Oral daily  glucagon  Injectable 1 milliGRAM(s) IntraMuscular once  insulin glargine Injectable (LANTUS) 35 Unit(s) SubCutaneous at bedtime  insulin lispro (ADMELOG) corrective regimen sliding scale   SubCutaneous three times a day before meals  insulin lispro Injectable (ADMELOG) 8 Unit(s) SubCutaneous three times a day before meals  lamoTRIgine 150 milliGRAM(s) Oral at bedtime  metoprolol tartrate 25 milliGRAM(s) Oral two times a day  pantoprazole    Tablet 40 milliGRAM(s) Oral before breakfast    MEDICATIONS  (PRN):  acetaminophen     Tablet .. 650 milliGRAM(s) Oral every 6 hours PRN Temp greater or equal to 38C (100.4F), Mild Pain (1 - 3)  dextrose Oral Gel 15 Gram(s) Oral once PRN Blood Glucose LESS THAN 70 milliGRAM(s)/deciliter  haloperidol     Tablet 5 milliGRAM(s) Oral every 8 hours PRN physical aggression  hydrOXYzine hydrochloride 50 milliGRAM(s) Oral every 6 hours PRN anxiety  LORazepam     Tablet 2 milliGRAM(s) Oral every 8 hours PRN agitation  ondansetron    Tablet 4 milliGRAM(s) Oral four times a day PRN N/V  
MEDICATIONS  (STANDING):  amitriptyline 75 milliGRAM(s) Oral at bedtime  dextrose 5%. 1000 milliLiter(s) (50 mL/Hr) IV Continuous <Continuous>  dextrose 5%. 1000 milliLiter(s) (100 mL/Hr) IV Continuous <Continuous>  dextrose 50% Injectable 25 Gram(s) IV Push once  dextrose 50% Injectable 12.5 Gram(s) IV Push once  dextrose 50% Injectable 25 Gram(s) IV Push once  escitalopram 20 milliGRAM(s) Oral daily  glucagon  Injectable 1 milliGRAM(s) IntraMuscular once  insulin glargine Injectable (LANTUS) 30 Unit(s) SubCutaneous at bedtime  insulin lispro (ADMELOG) corrective regimen sliding scale   SubCutaneous three times a day before meals  insulin lispro Injectable (ADMELOG) 8 Unit(s) SubCutaneous three times a day before meals  lamoTRIgine 150 milliGRAM(s) Oral at bedtime  metoprolol tartrate 25 milliGRAM(s) Oral two times a day  pantoprazole    Tablet 40 milliGRAM(s) Oral before breakfast    MEDICATIONS  (PRN):  acetaminophen     Tablet .. 650 milliGRAM(s) Oral every 6 hours PRN Temp greater or equal to 38C (100.4F), Mild Pain (1 - 3)  dextrose Oral Gel 15 Gram(s) Oral once PRN Blood Glucose LESS THAN 70 milliGRAM(s)/deciliter  haloperidol     Tablet 5 milliGRAM(s) Oral every 8 hours PRN physical aggression  hydrOXYzine hydrochloride 50 milliGRAM(s) Oral every 6 hours PRN anxiety  LORazepam     Tablet 2 milliGRAM(s) Oral every 8 hours PRN agitation  ondansetron    Tablet 4 milliGRAM(s) Oral four times a day PRN N/V

## 2023-06-12 NOTE — BH INPATIENT PSYCHIATRY PROGRESS NOTE - NSBHCONSBHPROVDETAILS_PSY_A_CORE  FT
Spoke with Dr. Levin; patient has been going to app, no acute complaints, no suicidal ideations reported. Patient typically has social stressor involving mother or boyfriend leading to suicide attempts or suicidal ideations as in past, however, this admission is unique in that it involves patient's father. Plan outpt was to increase lamictal for further mood stabilization, which can be done on the unit this admission. 

## 2023-06-12 NOTE — BH INPATIENT PSYCHIATRY DISCHARGE NOTE - NSBHFUPINTERVALHXFT_PSY_A_CORE
Patient was seen and evaluated this morning. Chart was reviewed and no acute events were noted. No PRN medications were administered overnight. Upon approach, patient is lying in bed comfortably. Patient is alert and oriented and engages with interviewer. Patient is cooperative and answers all questions appropriately.  Patient was seen and evaluated this morning. Chart was reviewed and no acute events were noted. No PRN medications were administered overnight. Upon approach, patient is lying in bed comfortably. Patient is alert and oriented and engages with interviewer. Patient is cooperative and answers all questions appropriately. Patient denies any current passive or active suicidal ideation, intent or plan. Patient reports good sleep and appetite. Reports no issues overnight.     Spoke with patient's mother Argelia on the phone to inform that patient will be discharged back to [facility] today. She reports no acute safety concerns at this time regarding patient.

## 2023-06-12 NOTE — BH INPATIENT PSYCHIATRY DISCHARGE NOTE - NSBHMSEAFFCONG_PSY_A_CORE
[employer] Activation    Thank you for requesting access to [employer]. Please follow the instructions below to securely access and download your online medical record. [employer] allows you to send messages to your doctor, view your test results, renew your prescriptions, schedule appointments, and more. How Do I Sign Up? 1. In your internet browser, go to [email or web address]  2. Click on the First Time User? Click Here link in the Sign In box. You will be redirect to the New Member Sign Up page. 3. Enter your [employer] Access Code exactly as it appears below. You will not need to use this code after youve completed the sign-up process. If you do not sign up before the expiration date, you must request a new code. [employer] Access Code: Activation code not generated  Patient does not meet minimum criteria for [employer] access. (This is the date your [employer] access code will )    4. Enter the last four digits of your Social Security Number (xxxx) and Date of Birth (mm/dd/yyyy) as indicated and click Submit. You will be taken to the next sign-up page. 5. Create a [employer] ID. This will be your [employer] login ID and cannot be changed, so think of one that is secure and easy to remember. 6. Create a [employer] password. You can change your password at any time. 7. Enter your Password Reset Question and Answer. This can be used at a later time if you forget your password. 8. Enter your e-mail address. You will receive e-mail notification when new information is available in [address] 9. Click Sign Up. You can now view and download portions of your medical record. 10. Click the Download Summary menu link to download a portable copy of your medical information. Additional Information    If you have questions, please visit the Frequently Asked Questions section of the [employer] website at [email or web address] Remember, [employer] is NOT to be used for urgent needs.  For medical emergencies, dial 911. [employer]

## 2023-06-12 NOTE — BH INPATIENT PSYCHIATRY DISCHARGE NOTE - NSDCCPCAREPLAN_GEN_ALL_CORE_FT
PRINCIPAL DISCHARGE DIAGNOSIS  Diagnosis: Moderate recurrent major depression  Assessment and Plan of Treatment:

## 2023-06-12 NOTE — BH INPATIENT PSYCHIATRY PROGRESS NOTE - PRN MEDS
MEDICATIONS  (PRN):  acetaminophen     Tablet .. 650 milliGRAM(s) Oral every 6 hours PRN Temp greater or equal to 38C (100.4F), Mild Pain (1 - 3)  dextrose Oral Gel 15 Gram(s) Oral once PRN Blood Glucose LESS THAN 70 milliGRAM(s)/deciliter  haloperidol     Tablet 5 milliGRAM(s) Oral every 8 hours PRN physical aggression  hydrOXYzine hydrochloride 50 milliGRAM(s) Oral every 6 hours PRN anxiety  LORazepam     Tablet 2 milliGRAM(s) Oral every 8 hours PRN agitation  ondansetron    Tablet 4 milliGRAM(s) Oral four times a day PRN N/V  
MEDICATIONS  (PRN):  acetaminophen     Tablet .. 975 milliGRAM(s) Oral every 8 hours PRN Severe Pain (7 - 10)  dextrose Oral Gel 15 Gram(s) Oral once PRN Blood Glucose LESS THAN 70 milliGRAM(s)/deciliter  haloperidol     Tablet 5 milliGRAM(s) Oral every 8 hours PRN physical aggression  hydrOXYzine hydrochloride 50 milliGRAM(s) Oral every 6 hours PRN anxiety  ibuprofen  Tablet. 600 milliGRAM(s) Oral every 8 hours PRN Severe Pain (7 - 10)  LORazepam     Tablet 2 milliGRAM(s) Oral every 8 hours PRN agitation  ondansetron    Tablet 4 milliGRAM(s) Oral four times a day PRN N/V  
MEDICATIONS  (PRN):  acetaminophen     Tablet .. 975 milliGRAM(s) Oral every 8 hours PRN Severe Pain (7 - 10)  dextrose Oral Gel 15 Gram(s) Oral once PRN Blood Glucose LESS THAN 70 milliGRAM(s)/deciliter  haloperidol     Tablet 5 milliGRAM(s) Oral every 8 hours PRN physical aggression  hydrOXYzine hydrochloride 50 milliGRAM(s) Oral every 6 hours PRN anxiety  ibuprofen  Tablet. 600 milliGRAM(s) Oral every 8 hours PRN Severe Pain (7 - 10)  LORazepam     Tablet 2 milliGRAM(s) Oral every 8 hours PRN agitation  ondansetron    Tablet 4 milliGRAM(s) Oral four times a day PRN N/V  
MEDICATIONS  (PRN):  acetaminophen     Tablet .. 650 milliGRAM(s) Oral every 6 hours PRN Temp greater or equal to 38C (100.4F), Mild Pain (1 - 3)  dextrose Oral Gel 15 Gram(s) Oral once PRN Blood Glucose LESS THAN 70 milliGRAM(s)/deciliter  haloperidol     Tablet 5 milliGRAM(s) Oral every 8 hours PRN physical aggression  hydrOXYzine hydrochloride 50 milliGRAM(s) Oral every 6 hours PRN anxiety  LORazepam     Tablet 2 milliGRAM(s) Oral every 8 hours PRN agitation  ondansetron    Tablet 4 milliGRAM(s) Oral four times a day PRN N/V

## 2023-06-12 NOTE — BH INPATIENT PSYCHIATRY DISCHARGE NOTE - NSBHMSEBODY_PSY_A_CORE
Referred by: Mariposa Abrams MD; Medical Diagnosis (from order):    Diagnosis Information      Diagnosis    E007.6 (ICD-9-CM) - Y93.67 (ICD-10-CM) - Injury while playing basketball    847.0 (ICD-9-CM) - S16.1XXA (ICD-10-CM) - Strain of neck muscle, initial encounter    739.1 (ICD-9-CM) - M99.01 (ICD-10-CM) - Cervical (neck) region somatic dysfunction                Physical Therapy -  Daily Treatment Note    Visit:  3     SUBJECTIVE                                                                                                             Patient states he is able to play basketball last night at a game.  He played most of the 1st half the little bit of the 2nd half and just noted a little bit of a headache.  Patient notes he is feeling much better and he notes no neck stiffness or soreness after playing.  Patient is pleased with his progress   Functional Change: Improvement in his ability to play basketball.      Pain / Symptoms:  Pain/symptom is: intermittent  Pain rating (out of 10): Current: 2   Quality / Description: ache, stiff, tight.  Alleviating Factors: change in position, heat.     OBJECTIVE                                                                                                                     Range of Motion (ROM) (norms in parentheses, measurement in degrees unless noted):   Cervical Flexion (45-50): within functional limits  Cervical Extension (45-60): 40°  Cervical Rotation (60-80):Left:  within functional limitsRight:  within functional limits  Cervical Side Bend (45):Left:  within functional limitsRight:  within functional limits     Palpation:     Comments / Details: Palpation reveals significant pitting improvement in the muscle play and muscle tone of the neck musculature particularly the cervical  paraspinals the scalenes and the upper traps.    Comments / Details: Palpation reveals increased tone of the sternocleidomastoid upper traps on the left with an increase in tissue density  decreased joint play of the C3-4 in 5 facets.      TREATMENT                                                                                                                  Manual Therapy:  Patient seen for soft tissue joint mobilization to the cervical upper thoracic spine focusing on lateral glides up glides and down glides of the C3-C4 C5 movement segments.  Continue to focus on recovery of up glides and down glides of the cervical spine.  Ultrasound (80288)    Location: Left-sided neck  Position: sidelying  Duty Cycle: 100%  Frequency: 1 Mhz  Intensity (w/cm2): 1.1  Intensity: patient tolerance  Duration: 8 minutes  Results: decreased pain, improved range of motion and tissue softening    Skilled input: verbal instruction/cues and tactile instruction/cues    Home Exercise Program: (*above indicates provided as part of home exercise program)  Patient continue working on his exercises of cervical isometrics cervical range of motion and functional movements of the neck during basketball.      ASSESSMENT                                                                                                             Patient is progressing very well.  Patient to continue with home exercises and continue to work on his return to basketball.  Patient was told that if he does require any other further service on his recovered a basketball that will discharge in 3 weeks.  He is to contact this office if he wants of further appointment.  Patient agreed to this plan  To date the patient has made gains as expected as reported. Patient will continue to benefit from skilled care as outlined.    Pain/symptoms after session: 0  Patient Education:   Results of above outlined education: Verbalizes understanding and Demonstrates understanding     PLAN                                                                                                                         Updates to plan of care: continue current plan of care    patient  involved in and agreed to plan of care and goals.       Procedures and total treatment time documented Time Entry flowsheet.     Overweight

## 2023-06-12 NOTE — BH INPATIENT PSYCHIATRY DISCHARGE NOTE - DESCRIPTION
domiciled with mother at [facility], currently applying for disability

## 2023-06-12 NOTE — BH INPATIENT PSYCHIATRY DISCHARGE NOTE - OTHER PAST PSYCHIATRIC HISTORY (INCLUDE DETAILS REGARDING ONSET, COURSE OF ILLNESS, INPATIENT/OUTPATIENT TREATMENT)
Multiple previous psychiatric admissions (most recent to [facility] in April 2023)  In outpatient treatment with Dr. Levin (psychiatrist), Kylee (therapist)  Currently on Lexapro and Lamictal  Multiple past suicidal gestures

## 2023-06-12 NOTE — BH INPATIENT PSYCHIATRY DISCHARGE NOTE - NSBHASSESSSUMMFT_PSY_ALL_CORE
Ania Mei is a 38 year old  female, domiciled with mother at [facility], currently applying for disability, PMHx of T1D, diabetic neuropathy, HTN, chronic back pain, migraine headaches, pseudoseizures, PPHx depression/anxiety and cluster B personality disorder, 7 previous psychiatric admissions (most recently discharged from [facility] on 4/25/23), multiple previous suicide attempts (remote hx of once by taking a capful of acetone, another by drinking hydrogen peroxide, another attempt by taking half a bottle of ibuprofen; most recent SA 4-5mo ago by ingesting Pinesol, no known medical consequences), no legal or violence hx, denies substance use, BIBEMS activated by residence facility after patient expressed suicidal ideation.    6-9-23: increased lamictal 100mg to 150mg qhs; increased lantus 30mg qhs to 35mg qhs    #ARMAAN   #Cluster B Traits   #Depression  - c/w Lexapro 20 mg qd  - c/w lamictal 150mg qhs  - c/w amitriptyline 75mg qd     #Type 1 Diabetes   - DC insulin pump while on inpatient unit  - Lantus 35mg at bedtime (increased from 30 mg)  - Lispro 8 TID  - Sliding scale insulin    #HTN  - Metoprolol 25 mg BID    #Anemia  - Ferrous sulfate    #Nausea  - PRN Odansetron

## 2023-06-12 NOTE — BH INPATIENT PSYCHIATRY PROGRESS NOTE - NSTXFALLGOAL_PSY_ALL_CORE
Verbalize understanding of fall prevention interventions

## 2023-06-12 NOTE — BH INPATIENT PSYCHIATRY DISCHARGE NOTE - NSBHDCMEDICALFT_PSY_A_CORE
#Type 1 Diabetes   - DC insulin pump while on inpatient unit  - Lantus 35mg at bedtime (increased from 30 mg)  - Lispro 8 TID  - Sliding scale insulin    #HTN  - Metoprolol 25 mg BID    #Anemia  - Ferrous sulfate    #Nausea  - PRN Odansetron

## 2023-06-12 NOTE — BH INPATIENT PSYCHIATRY DISCHARGE NOTE - NSICDXPASTMEDICALHX_GEN_ALL_CORE_FT
PAST MEDICAL HISTORY:  Degenerative disc disease, thoracic     Gastroesophageal reflux disease, esophagitis presence not specified     Intractable headache, unspecified chronicity pattern, unspecified headache type     Major depressive disorder with single episode, in full remission previously treated with zyprexa, celexa and lexapro    Neuropathy right lower extremity, vaginal    Spinal stenosis     Tachycardia     Tremor of right hand     Type 1 diabetes     Urinary tract infection, recurrent      obese/debilitated

## 2023-06-12 NOTE — BH INPATIENT PSYCHIATRY PROGRESS NOTE - NSTXPROBFALL_PSY_ALL_CORE
What Type Of Note Output Would You Prefer (Optional)?: Standard Output
How Severe Are Your Spot(S)?: mild
Have Your Spot(S) Been Treated In The Past?: has not been treated
Hpi Title: Evaluation of a Skin Lesion
FALL RISK

## 2023-06-12 NOTE — BH INPATIENT PSYCHIATRY DISCHARGE NOTE - LEGAL HISTORY
Telephone Encounter by Rogelio Tolbert MD at 06/12/17 03:10 PM     Author:  Rogelio Tolbert MD Service:  (none) Author Type:  Physician     Filed:  06/12/17 03:10 PM Encounter Date:  6/12/2017 Status:  Signed     :  Rogelio Tolbert MD (Physician)            Schedule EKG is request[MM1.1M]      Revision History        User Key Date/Time User Provider Type Action    > MM1.1 06/12/17 03:10 PM Rogelio Tolbert MD Physician Sign    M - Manual             none

## 2023-06-12 NOTE — BH INPATIENT PSYCHIATRY PROGRESS NOTE - NSBHASSESSSUMMFT_PSY_ALL_CORE
Ania Mei is a 38 year old  female, domiciled with mother at [facility], currently applying for disability, PMHx of T1D, diabetic neuropathy, HTN, chronic back pain, migraine headaches, pseudoseizures, PPHx depression/anxiety and cluster B personality disorder, 7 previous psychiatric admissions (most recently discharged from [facility] on 4/25/23), multiple previous suicide attempts (remote hx of once by taking a capful of acetone, another by drinking hydrogen peroxide, another attempt by taking half a bottle of ibuprofen; most recent SA 4-5mo ago by ingesting Pinesol, no known medical consequences), no legal or violence hx, denies substance use, BIBEMS activated by residence facility after patient expressed suicidal ideation.    On initial assessment, patient reported increasing suicidal thoughts over the past few days after thinking about the loss of her father. Today, patient still has some symptoms of anxiety and continues to endorse grief over the loss of her father, but denies active suicidal ideation, intent or plan. Lamictal is being titrated up for further mood stabilization given patient's impulsivity and mood lability and will be given first increased dose of lamictal overnight and will observe over the weekend. Patient would benefit from further inpatient psychiatric hospitalization for further medication management.     6-9-23: increased lamictal 100mg to 150mg qhs; increased lantus 30mg qhs to 35mg qhs    #ARMAAN   #Cluster B Traits   #Depression  - c/w Lexapro 20 mg qd  - c/w lamictal 150mg qhs  - c/w amitriptyline 75mg qd     #Type 1 Diabetes   - DC insulin pump while on inpatient unit  - Lantus 35mg at bedtime (increased from 30 mg)  - Lispro 8 TID  - Sliding scale insulin    #HTN  - Metoprolol 25 mg BID    #Anemia  - Ferrous sulfate    #Nausea  - PRN Odansetron    #Agitation  -For episodes of acute agitation can offer PO Haldol 5 mg/Ativan 2 mg/Benadryl 50 mg Q6H PRN. If refusing PO and is in acute risk of harm to self/other, can offer IM Haldol 5 mg/Ativan 2 mg/Benadryl 50 mg Q6H PRN. If given, please repeat EKG and hold antipsychotics if QTC>500   
Ania Mei is a 38 year old  female, domiciled with mother at [facility], currently applying for disability, PMHx of T1D, diabetic neuropathy, HTN, chronic back pain, migraine headaches, pseudoseizures, PPHx depression/anxiety and cluster B personality disorder, 7 previous psychiatric admissions (most recently discharged from [facility] on 23), multiple previous suicide attempts (remote hx of once by taking a capful of acetone, another by drinking hydrogen peroxide, another attempt by taking half a bottle of ibuprofen; most recent SA 4-5mo ago by ingesting Pinesol, no known medical consequences), no legal or violence hx, denies substance use, BIBEMS activated by residence facility after patient expressed suicidal ideation.    On initial assessment, patient reported increasing suicidal thoughts over the past few days after thinking about the loss of her father. Today, patient has improved depressive symptoms. Patient reports period of depressive symptoms yesterday while thinking of her  father, but she denies any suicidal ideation or intent currently. Lamictal has been titrated up for further mood stabilization given patient's impulsivity and mood lability and we will continue to monitor for any adverse effects such as Gracia Severino syndrome given patient's extensive allergy history and seeming sensitivity to increases in medications. We also need to complete thorough safety plan with patient prior to safe discharge. Patient would benefit from further inpatient psychiatric hospitalization for further medication management.     23: increased lamictal 100mg to 150mg qhs; increased lantus 30mg qhs to 35mg qhs    #ARMAAN   #Cluster B Traits   #Depression  - c/w Lexapro 20 mg qd  - c/w lamictal 150mg qhs  - c/w amitriptyline 75mg qd     #Type 1 Diabetes   - DC insulin pump while on inpatient unit  - Lantus 35mg at bedtime (increased from 30 mg)  - Lispro 8 TID  - Sliding scale insulin    #HTN  - Metoprolol 25 mg BID    #Anemia  - Ferrous sulfate    #Nausea  - PRN Odansetron    #Agitation  -For episodes of acute agitation can offer PO Haldol 5 mg/Ativan 2 mg/Benadryl 50 mg Q6H PRN. If refusing PO and is in acute risk of harm to self/other, can offer IM Haldol 5 mg/Ativan 2 mg/Benadryl 50 mg Q6H PRN. If given, please repeat EKG and hold antipsychotics if QTC>500   
Ania Mei is a 38 year old  female, domiciled with mother at [facility], currently applying for disability, PMHx of T1D, diabetic neuropathy, HTN, chronic back pain, migraine headaches, pseudoseizures, PPHx depression/anxiety and cluster B personality disorder, 7 previous psychiatric admissions (most recently discharged from [facility] on 4/25/23), multiple previous suicide attempts (remote hx of once by taking a capful of acetone, another by drinking hydrogen peroxide, another attempt by taking half a bottle of ibuprofen; most recent SA 4-5mo ago by ingesting Pinesol, no known medical consequences), no legal or violence hx, denies substance use, BIBEMS activated by residence facility after patient expressed suicidal ideation.    On initial assessment, patient reported increasing suicidal thoughts over the past few days after thinking about the loss of her father. Today, patient still has some symptoms of anxiety and continues to endorse grief over the loss of her father, but denies active suicidal ideation, intent or plan. Lamictal is being titrated up for further mood stabilization given patient's impulsivity and mood lability and will be given first increased dose of lamictal overnight and will observe over the weekend. Patient would benefit from further inpatient psychiatric hospitalization for further medication management.     6-9-23: increased lamictal 100mg to 150mg qhs; increased lantus 30mg qhs to 35mg qhs  6-10-23 Patient remains somatically preoccupied and talks to writer of her medical problems.   She continues to be somewhat despondent and unable to guarantee her safety.    #ARMAAN   #Cluster B Traits   #Depression  - c/w Lexapro 20 mg qd  - c/w lamictal 150mg qhs  - c/w amitriptyline 75mg qd     #Type 1 Diabetes   - DC insulin pump while on inpatient unit  - Lantus 35mg at bedtime (increased from 30 mg)  - Lispro 8 TID  - Sliding scale insulin    #HTN  - Metoprolol 25 mg BID    #Anemia  - Ferrous sulfate    #Nausea  - PRN Odansetron    #Agitation  -For episodes of acute agitation can offer PO Haldol 5 mg/Ativan 2 mg/Benadryl 50 mg Q6H PRN. If refusing PO and is in acute risk of harm to self/other, can offer IM Haldol 5 mg/Ativan 2 mg/Benadryl 50 mg Q6H PRN. If given, please repeat EKG and hold antipsychotics if QTC>500   
Ania Mei is a 38 year old  female, domiciled with mother at [facility], currently applying for disability, PMHx of T1D, diabetic neuropathy, HTN, chronic back pain, migraine headaches, pseudoseizures, PPHx depression/anxiety and cluster B personality disorder, 7 previous psychiatric admissions (most recently discharged from [facility] on 4/25/23), multiple previous suicide attempts (remote hx of once by taking a capful of acetone, another by drinking hydrogen peroxide, another attempt by taking half a bottle of ibuprofen; most recent SA 4-5mo ago by ingesting Pinesol, no known medical consequences), no legal or violence hx, denies substance use, BIBEMS activated by residence facility after patient expressed suicidal ideation.    On initial assessment, patient reported increasing suicidal thoughts over the past few days after thinking about the loss of her father. Today, patient still has some symptoms of anxiety and continues to endorse grief over the loss of her father, but denies active suicidal ideation, intent or plan. Lamictal is being titrated up for further mood stabilization given patient's impulsivity and mood lability and will be given first increased dose of lamictal overnight and will observe over the weekend. Patient would benefit from further inpatient psychiatric hospitalization for further medication management.     6-9-23: increased lamictal 100mg to 150mg qhs; increased lantus 30mg qhs to 35mg qhs  6-10-23 Patient remains somatically preoccupied and talks to writer of her medical problems.   She continues to be somewhat despondent and unable to guarantee her safety.    #ARMAAN   #Cluster B Traits   #Depression  - c/w Lexapro 20 mg qd  - c/w lamictal 150mg qhs  - c/w amitriptyline 75mg qd     #Type 1 Diabetes   - DC insulin pump while on inpatient unit  - Lantus 35mg at bedtime (increased from 30 mg)  - Lispro 8 TID  - Sliding scale insulin    #HTN  - Metoprolol 25 mg BID    #Anemia  - Ferrous sulfate    #Nausea  - PRN Odansetron    #Agitation  -For episodes of acute agitation can offer PO Haldol 5 mg/Ativan 2 mg/Benadryl 50 mg Q6H PRN. If refusing PO and is in acute risk of harm to self/other, can offer IM Haldol 5 mg/Ativan 2 mg/Benadryl 50 mg Q6H PRN. If given, please repeat EKG and hold antipsychotics if QTC>500
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
## 2023-06-12 NOTE — BH INPATIENT PSYCHIATRY PROGRESS NOTE - NSICDXBHPRIMARYDX_PSY_ALL_CORE
Moderate episode of recurrent major depressive disorder   F33.1  

## 2023-06-12 NOTE — BH INPATIENT PSYCHIATRY PROGRESS NOTE - NSBHFUPINTERVALCCFT_PSY_A_CORE
"It hurts, my back hurts"
" I am ok "
"Yesterday was a little rough"
"I couldn't get out of bed this morning"

## 2023-06-13 VITALS — DIASTOLIC BLOOD PRESSURE: 96 MMHG | RESPIRATION RATE: 18 BRPM | HEART RATE: 119 BPM | SYSTOLIC BLOOD PRESSURE: 134 MMHG

## 2023-06-13 LAB
GLUCOSE BLDC GLUCOMTR-MCNC: 166 MG/DL — HIGH (ref 70–99)
GLUCOSE BLDC GLUCOMTR-MCNC: 247 MG/DL — HIGH (ref 70–99)
GLUCOSE BLDC GLUCOMTR-MCNC: 413 MG/DL — HIGH (ref 70–99)

## 2023-06-13 RX ADMIN — Medication 8 UNIT(S): at 07:30

## 2023-06-13 RX ADMIN — PANTOPRAZOLE SODIUM 40 MILLIGRAM(S): 20 TABLET, DELAYED RELEASE ORAL at 06:30

## 2023-06-13 RX ADMIN — ESCITALOPRAM OXALATE 20 MILLIGRAM(S): 10 TABLET, FILM COATED ORAL at 08:06

## 2023-06-13 RX ADMIN — Medication 4: at 11:23

## 2023-06-13 RX ADMIN — Medication 8 UNIT(S): at 11:16

## 2023-06-13 RX ADMIN — Medication 25 MILLIGRAM(S): at 08:06

## 2023-06-13 RX ADMIN — Medication 1: at 07:38

## 2023-06-13 NOTE — BH DISCHARGE NOTE NURSING/SOCIAL WORK/PSYCH REHAB - NSDCCRNUMBER_GEN_ALL_CORE_FT
[phone] / [email or web address]

## 2023-06-13 NOTE — BH DISCHARGE NOTE NURSING/SOCIAL WORK/PSYCH REHAB - NSDCINSTRUCTIONS_PSY_ALL_CORE_FT
- - -
When discharged, take only medications prescribed as instructed by your hospital provider    Do not stop or change any medications until you discuss changes with your own prescriber    Do not take any other medications, including left over medications from before your admission, over the counter medications or herbal supplements, unless you discuss with your own provider

## 2023-06-13 NOTE — BH DISCHARGE NOTE NURSING/SOCIAL WORK/PSYCH REHAB - PATIENT PORTAL LINK FT
You can access the FollowMyHealth Patient Portal offered by [facility] by registering at the following website: [email or web address]. By joining [employer]’s FollowMyHealth portal, you will also be able to view your health information using other applications (apps) compatible with our system.

## 2023-06-13 NOTE — BH DISCHARGE NOTE NURSING/SOCIAL WORK/PSYCH REHAB - NSDCPEFALRISK_GEN_ALL_CORE
For information on Fall & Injury Prevention, visit: [email or web address] OR  [email or web address] OR  https://www.cdc.gov/steadi/patient.html

## 2023-06-13 NOTE — BH DISCHARGE NOTE NURSING/SOCIAL WORK/PSYCH REHAB - NSCDUDCCRISIS_PSY_A_CORE
[facility] Well  1 (013) [facility]-WELL ([phone])  Text "WELL" to 14178  Website: www.[employer]/.[employer] 1 (446) 621-HOPE (0157) Website: www.safehorizon.org/.National Suicide Prevention Lifeline 8 (999) 753-4781/.  [employer]  1 (258) LIFENET (514-1809)/988 Suicide and Crisis Lifeline

## 2023-06-14 ENCOUNTER — NON-APPOINTMENT (OUTPATIENT)
Age: 38
End: 2023-06-14

## 2023-06-14 NOTE — BH INPATIENT PSYCHIATRY PROGRESS NOTE - NSBHCONSULTIPREASON_PSY_A_CORE
danger to self; mental illness expected to respond to inpatient care
Stable
danger to self; mental illness expected to respond to inpatient care

## 2023-06-14 NOTE — DISCUSSION/SUMMARY
[FreeTextEntry1] : Patient reports developing rash over the last few days, she describes rash as spotty, nontender on her chest and back. Pt/mother report rash appearing to be "heat rash." She denies any flu like sxs, sore throat, fever, malaise. She denies any blistering or involvement in the mouth, lips, eyes. Pt was instructed to go to the ED to have rash evaluated, however declined at this time. Stating that she will have provider take a look at it tomorrow during apt. Pt however agreeable to go to the ED if rash worsens.

## 2023-06-15 ENCOUNTER — OUTPATIENT (OUTPATIENT)
Dept: OUTPATIENT SERVICES | Facility: HOSPITAL | Age: 38
LOS: 1 days | End: 2023-06-15
Payer: MEDICAID

## 2023-06-15 ENCOUNTER — APPOINTMENT (OUTPATIENT)
Dept: PSYCHIATRY | Facility: CLINIC | Age: 38
End: 2023-06-15
Payer: MEDICAID

## 2023-06-15 ENCOUNTER — NON-APPOINTMENT (OUTPATIENT)
Age: 38
End: 2023-06-15

## 2023-06-15 DIAGNOSIS — F41.1 GENERALIZED ANXIETY DISORDER: ICD-10-CM

## 2023-06-15 LAB — GLUCOSE BLDC GLUCOMTR-MCNC: 317 MG/DL — HIGH (ref 70–99)

## 2023-06-15 PROCEDURE — 99213 OFFICE O/P EST LOW 20 MIN: CPT

## 2023-06-15 PROCEDURE — ZZZZZ: CPT

## 2023-06-15 RX ORDER — HYDROXYZINE HYDROCHLORIDE 25 MG/1
25 TABLET ORAL
Qty: 60 | Refills: 0 | Status: DISCONTINUED | COMMUNITY
Start: 2023-04-03 | End: 2023-06-15

## 2023-06-15 NOTE — HISTORY OF PRESENT ILLNESS
[No] : no [FreeTextEntry1] : \par  [FreeTextEntry2] : five inpatient admissions, most recently 4/2023 for SI, had also reported SA by drinking bleach/pinsole after father's death in 11/2022; prior to this patient has history of 3 remote SA in her teens (approx. 16/18 yo, reports taking capeful acetone, drinking hydrogen peroxide, and half bottle of ibuprofen)\par multiple failed medication trials \par multiple medical ED visits for pseudoseizures and other medical complaints \par  [FreeTextEntry3] : celexa\par cymbalta (not effective)\par zyprexa\par buspar (not effective)\par gabapentin (hives)\par melatonin  [de-identified] : \par

## 2023-06-15 NOTE — SOCIAL HISTORY
[Assisted Living Facility] : lives in an assisted living facility [Disabled] : disabled [Never ] : never  [High School] : high school [None] : none [No Known Substance Use] : no known substance use [Yes] : yes [No Known Use] : no known use [FreeTextEntry1] : [facility] for the past 2.5yrs [TextBox_52] : Prescribed

## 2023-06-15 NOTE — DISCUSSION/SUMMARY
[FreeTextEntry1] : Ms. Ania Mei is a 38yo single  female., domiciled at [facility], [facility] DM type 1, DM neuropathy, fatty liver, migraine HA, pseudoseizures, pph ARMAAN, cluster B, 6 prior [facility] admissions most recenlty 6/2023, 4/2023, 4 prior SA (drinking capeful of acetone, drinking hydrogen peroxide, taking 1/2 bottle ibuprofen, and drinking pinesol/bleach).   \par Ania most recently  reported severe severe stressors sine beginning of 2020 , including homelessness , changes in living situation, stress over the covid pandemic with losses within the family including his father on 11/28/22.  Ania reported increased of anxiety with episodes of falling, generalized shakiness, head pressure and inability to move, patients EEG and video EEG were reportedly negative for any seizure activity. \par \par On visit today, pt reports that she continues to grief the loss of her father. No SI since d/c from [facility]. Discussed coping skills for SI. Will continue current regimen.  No homicidal thoughts. No overt psychosis or alvina on exam. Patient has appropriate protective factors in place. Is engaged in his treatment. No acute safety concerns. Outpatient level of care remains appropriate.\par \par \par Pt is not an imminent risk to self. She remains at a chronically elevated risks due to history of previous suicidal gestures and attempts, history of hospitalization, mood deregulation, hx of impulsivity. This however is mitigated by future talk, no current SI w/ intent or plan, identifies reasons for living, engaged in outpatient treatment, living in closely monitored facility. Furthermore, pt chronic risk will not be mitigated by inpatient hospitalization. She is not an imminent risk to self/other and outpatient level of care remains appropriate. \par  \par Safety plan discussed at lengths w/ pt/mother. Pt reports that she will call 911 or go to the nearest Emergency room should she become an imminent risk to self/other. Pt will also utilize suicide hotline number provided. \par \par current diagnostic impression: somatic symptom disorder, ARMAAN, cluster B traits r/o dependent personality disorder \par \par continue lamictal 150mg  \par Escitalopram 20mg po daily\par continue individual therapy w/ Suzan \par RTC 2 week \par

## 2023-06-15 NOTE — PHYSICAL EXAM
[Euthymic] : euthymic [Cooperative] : cooperative [Full] : full [Clear] : clear [Linear/Goal Directed] : linear/goal directed [WNL] : within normal limits [Average] : average [FreeTextEntry2] : uses a walker  [FreeTextEntry5] : dispersed red spots noted on chest and back, nontender, not confluent, no blistering  [de-identified] : Fair [de-identified] : Fair

## 2023-06-16 ENCOUNTER — OUTPATIENT (OUTPATIENT)
Dept: OUTPATIENT SERVICES | Facility: HOSPITAL | Age: 38
LOS: 1 days | End: 2023-06-16
Payer: MEDICAID

## 2023-06-16 ENCOUNTER — APPOINTMENT (OUTPATIENT)
Dept: PSYCHIATRY | Facility: CLINIC | Age: 38
End: 2023-06-16

## 2023-06-16 DIAGNOSIS — F60.3 BORDERLINE PERSONALITY DISORDER: ICD-10-CM

## 2023-06-16 DIAGNOSIS — F41.1 GENERALIZED ANXIETY DISORDER: ICD-10-CM

## 2023-06-16 PROCEDURE — 90832 PSYTX W PT 30 MINUTES: CPT

## 2023-06-16 NOTE — REASON FOR VISIT
[Patient] : Patient [Post-Hospitalization Visit] : This is a post-hospitalization visit [FreeTextEntry1] : Patient is a 38 year old female seen for a followup therapy session, 10 AM to 10:35 AM

## 2023-06-16 NOTE — BH SOCIAL WORK CONFIRMATION FOLLOW UP NOTE - NSLINKEDTOLOC_PSY_ALL_CORE
Ania attended her outpatient mental health appointment at [facility] as scheduled, .
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
## 2023-06-16 NOTE — RISK ASSESSMENT
[No, patient denies ideation or behavior] : No, patient denies ideation or behavior [Low acute suicide risk] : Low acute suicide risk [No] : No [Yes] : Safety Plan completed/updated (for individuals at risk): Yes [FreeTextEntry8] : Patient reports that she was back in [facility] due to passive suicide thoughts that were triggered by grief, upcoming first Father's Day without father, and thinking about an argument she had with him before he .

## 2023-06-16 NOTE — PLAN
[[facility]] : [facility]  [Supportive Therapy] : Supportive Therapy [Recommended Frequency of Visits: ____] : Recommended frequency of visits: [unfilled] [Return in ____ week(s)] : Return in [unfilled] week(s) [FreeTextEntry2] : Goal #1 " I want to feel better, and need help managing my anxiety and stress" \par \par Objective #1  Continued...  Patient will learn assertive communication and be able to set boundaries and limits.  \par Objective #2 Continued... Patient will learn and utilize positive coping, bereavement, stress management, and CBT/DBT methods. \par \par   Goal #2  " I want to improve the quality of my life" \par Obj#1 Continued...  Patient will comply with all medical and psychiatric treatment recommendations" \par Obj#2 Continued...  Patient will participate in DBT skills group, other support groups and/or Programs. \par  \par  [de-identified] : Patient reports that her mood has been better with increase of Lamictal dosage to 150 mg. Spoke about triggers or precipitants to being readmitted to [facility] last week. She reports that she went to [place] ER on  due to burning in her legs from her neuropathy that felt worse, chest pains, feeling depressed, and having suicide thoughts. She reports that she was having thoughts of wanting to be with her  father and drinking Lysol but did not act upon these thoughts. She reports that these thoughts were triggered by her grief, upcoming first father's day without her father, and thinking about the argument she had with her father before he . She denies any current suicide ideations and that her physical issues are currently less intense She reports taking Tylenol for her pain. Reinforced patient for following safely plan by going to the ER last week. She reports that skills group in [facility] were helpful. She reports that she would participate in DBT skills group which is virtual but can not, due to poor reception at her residence and time of the groups being the same as her lunch time. Reviewed some of the skills she learned and is using, and assigned some new DBT skills. She reports having a better understanding of why she has multiple diagnosis and about her cluster B or borderline traits. Spoke about need to self-sooth, use relaxation methods, and crisis resolution or management methods in order to not act out, not avoid, and to break dysfunctional patterns. She reports that she reports that coloring, watching movies, using "Stop and TIp" methods are helpful. She reports that she and mother will manage Father's Day by watching a Comedy together and going out shopping. She reports feeling comfort in certain signs she feels are related to her father's spirit telling her that he is OK, such as anything with a hear. She reports that the crumb cake at the [facility] unit has a heart on it. She will consider and explore bereavement groups.  She reports that her relationship with Jesus has been less conflictual since he has been compliant with her mental health treatment, more stable, and not aggressive or threatening. She reports efforts to set limits and boundaries such as not taking calls all the time or silencing her phone. Spoke about mother's involvement in her treatment and to take more ownership such as with her appointments, being the one to call and comminute any problems. She provided her own cell phone number which she says is now working.   [FreeTextEntry1] : Patient will focus on positive coping, stress management, conflict resolution, relaxation, CBT, and DBT methods. She will attend to medical issues, continue medication regimen, and followup with therapy on 6/26.

## 2023-06-16 NOTE — DISCUSSION/SUMMARY
[Plan Review] : Plan Review [Articulate] : articulate [Cognitively intact] : cognitively intact [Motivated to participate in treatment] : motivated to participate in treatment [Housing stability] : housing stability [Connected to healthcare] : connected to healthcare [English fluency] : English fluency [Access to safe outdoor spaces] : access to safe outdoor spaces [FreeTextEntry2] : 6/14/24 [FreeTextEntry3] : 3/1/22 [FreeTextEntry7] : Limited social support from other residents at the adult home. Patient has limited family support, only from her mother but has a codependent and dysfunctional relationship with her.  [FreeTextEntry8] : Patient sometimes does not want to come to her therapy sessions, complaining of somatic symptoms.  [FreeTextEntry9] : None  [de-identified] : Mother and  that the adult home.  [Interpersonal Relationships] : Interpersonal Relationships [Other: ____] : [unfilled] [every ___ months] : every [unfilled] months [Mental Health] : Mental Health [Physical Health] : Physical Health [Continued - Progress made] : Continued - Progress made: [every ___ weeks] : every [unfilled] weeks [___ times a week] : [unfilled] times a week [A change in level of care is needed as evidenced by:] : A change in level of care is needed as evidenced by: [None - Reason others did not participate:] : None - Reason others did not participate:  [Yes] : Yes [Psychiatric Provider/Prescriber] : Psychiatric Provider/Prescriber [Therapist] : Therapist [FreeTextEntry1] : Patient has mulple medical issues, continues to have problems attending to self-care, difficulty regulating her emotions, and managing her stress.  [FreeTextEntry4] : " I want to improve the quality of my life"  [de-identified] : Patient has been compliant with medication which has been adjusted.  [de-identified] : Patient will comply with all medical and psychiatric treatment and recommendations.  [de-identified] : 6/14/24 [de-identified] : Patient has missed some some therapy sessions before she was [facility].  [de-identified] : Patient will participate in DBT, and/or other support groups or programs.  [de-identified] : 6/14/24 [de-identified] : Patient has difficulty with doing telehealth sessions and may not be able to participate in DBT skills groups. She reports skills groups in [facility] were helpful.  [FreeTextEntry5] : [place] [facility]  [de-identified] : According to patient's needs  [de-identified] :  Patient is unable to preform activities of daily living and/or patient becomes suicidal.  [de-identified] : Not clinically indicated

## 2023-06-16 NOTE — PLAN
[[facility] : [facility]  [[facility]ive Therapy] : Supportive Therapy [Recommended Frequency of Visits: ____] : Recommended frequency of visits: [unfilled] [Return in ____ week(s)] : Return in [unfilled] week(s) [FreeTextEntry2] : Goal #1 " I want to feel better, and need help managing my anxiety and stress" \par \par Objective #1  Continued...  Patient will learn assertive communication and be able to set boundaries and limits.  \par Objective #2 Continued... Patient will learn and utilize positive coping, bereavement, stress management, and CBT/DBT methods. \par \par   Goal #2  " I want to improve the quality of my life" \par Obj#1 Continued...  Patient will comply with all medical and psychiatric treatment recommendations" \par Obj#2 Continued...  Patient will participate in DBT skills group, other support groups and/or Programs. \par  \par  [de-identified] : Patient reports that her mood has been better with increase of Lamictal dosage to 150 mg. Spoke about triggers or precipitants to being readmitted to [facility] last week. She reports that she went to [place] ER on  due to burning in her legs from her neuropathy that felt worse, chest pains, feeling depressed, and having suicide thoughts. She reports that she was having thoughts of wanting to be with her  father and drinking Lysol but did not act upon these thoughts. She reports that these thoughts were triggered by her grief, upcoming first father's day without her father, and thinking about the argument she had with her father before he . She denies any current suicide ideations and that her physical issues are currently less intense She reports taking Tylenol for her pain. Reinforced patient for following safely plan by going to the ER last week. She reports that skills group in [facility] were helpful. She reports that she would participate in DBT skills group which is virtual but can not, due to poor reception at her residence and time of the groups being the same as her lunch time. Reviewed some of the skills she learned and is using, and assigned some new DBT skills. She reports having a better understanding of why she has multiple diagnosis and about her cluster B or borderline traits. Spoke about need to self-sooth, use relaxation methods, and crisis resolution or management methods in order to not act out, not avoid, and to break dysfunctional patterns. She reports that she reports that coloring, watching movies, using "Stop and TIp" methods are helpful. She reports that she and mother will manage Father's Day by watching a Comedy together and going out shopping. She reports feeling comfort in certain signs she feels are related to her father's spirit telling her that he is OK, such as anything with a hear. She reports that the crumb cake at the [facility] has a heart on it. She will consider and explore bereavement groups.  She reports that her relationship with Jesus has been less conflictual since he has been compliant with her mental health treatment, more stable, and not aggressive or threatening. She reports efforts to set limits and boundaries such as not taking calls all the time or silencing her phone. Spoke about mother's involvement in her treatment and to take more ownership such as with her appointments, being the one to call and comminute any problems. She provided her own cell phone number which she says is now working.   [FreeTextEntry1] : Patient will focus on positive coping, stress management, conflict resolution, relaxation, CBT, and DBT methods. She will attend to medical issues, continue medication regimen, and followup with therapy on 6/26.

## 2023-06-16 NOTE — PHYSICAL EXAM
[Cooperative] : cooperative [Euthymic] : euthymic [Anxious] : anxious [Full] : full [Clear] : clear [Linear/Goal Directed] : linear/goal directed [Average] : average [WNL] : within normal limits [de-identified] : Fair

## 2023-06-16 NOTE — PHYSICAL EXAM
[Cooperative] : cooperative [Euthymic] : euthymic [Anxious] : anxious [Full] : full [Clear] : clear [Linear/Goal Directed] : linear/goal directed [Average] : average [WNL] : within normal limits [de-identified] : Fair

## 2023-06-17 DIAGNOSIS — K21.9 GASTRO-ESOPHAGEAL REFLUX DISEASE WITHOUT ESOPHAGITIS: ICD-10-CM

## 2023-06-17 DIAGNOSIS — E66.9 OBESITY, UNSPECIFIED: ICD-10-CM

## 2023-06-17 DIAGNOSIS — D64.9 ANEMIA, UNSPECIFIED: ICD-10-CM

## 2023-06-17 DIAGNOSIS — F33.1 MAJOR DEPRESSIVE DISORDER, RECURRENT, MODERATE: ICD-10-CM

## 2023-06-17 DIAGNOSIS — Z88.0 ALLERGY STATUS TO PENICILLIN: ICD-10-CM

## 2023-06-17 DIAGNOSIS — G43.709 CHRONIC MIGRAINE WITHOUT AURA, NOT INTRACTABLE, WITHOUT STATUS MIGRAINOSUS: ICD-10-CM

## 2023-06-17 DIAGNOSIS — F60.89 OTHER SPECIFIC PERSONALITY DISORDERS: ICD-10-CM

## 2023-06-17 DIAGNOSIS — I10 ESSENTIAL (PRIMARY) HYPERTENSION: ICD-10-CM

## 2023-06-17 DIAGNOSIS — Z88.8 ALLERGY STATUS TO OTHER DRUGS, MEDICAMENTS AND BIOLOGICAL SUBSTANCES: ICD-10-CM

## 2023-06-17 DIAGNOSIS — Z88.1 ALLERGY STATUS TO OTHER ANTIBIOTIC AGENTS STATUS: ICD-10-CM

## 2023-06-17 DIAGNOSIS — F41.1 GENERALIZED ANXIETY DISORDER: ICD-10-CM

## 2023-06-17 DIAGNOSIS — E10.65 TYPE 1 DIABETES MELLITUS WITH HYPERGLYCEMIA: ICD-10-CM

## 2023-06-17 DIAGNOSIS — F60.3 BORDERLINE PERSONALITY DISORDER: ICD-10-CM

## 2023-06-17 DIAGNOSIS — M50.30 OTHER CERVICAL DISC DEGENERATION, UNSPECIFIED CERVICAL REGION: ICD-10-CM

## 2023-06-17 DIAGNOSIS — M53.3 SACROCOCCYGEAL DISORDERS, NOT ELSEWHERE CLASSIFIED: ICD-10-CM

## 2023-06-19 NOTE — BH SAFETY PLAN - WARNING SIGN 2
Referring provider: Dr. Barnes    Reason for consultation:  Anemia        HPI:    Patient is a 74-year-old with history of hypertension, GERD, CKD, vitamin-D deficiency who was found to have anemia at his PCP's office and was referred to our clinic for further workup and recommendations.  Patient presented to clinic on 06/19/2023 to establish care.    Patient denies smoking.  Reports daily alcohol use.  Denies any recreational drug use.  Works as a supervisor, worked with asbestos insulation.  He is a [place] vet and was exposed to agent orange.  Denies family history of blood malignancies but does report lung cancer in his mother.  Reports anemia in his daughter.      Interval history:  Today, 06/19/2023, reports fatigue but denies any other acute concerns.  He denies any symptoms of shortness of breath, chest pain, dizziness or palpitations.      Laboratory  04/11/2023:  WBC count 6.7, hemoglobin 10.5, MCV 89, platelet count 190, iron 90,% saturation 32, ferritin 255, TIBC 278.     History reviewed. No pertinent past medical history.  Hypertension, hyperlipidemia,      History reviewed. No pertinent surgical history.  Cholecystectomy    History reviewed. No pertinent family history.    Social History     Socioeconomic History    Marital status:    Tobacco Use    Smoking status: Former     Types: Cigarettes    Smokeless tobacco: Never   Substance and Sexual Activity    Alcohol use: Yes     Comment: social    Drug use: Never       Current Outpatient Medications   Medication Sig Dispense Refill    allopurinoL (ZYLOPRIM) 100 MG tablet allopurinol 100 mg tablet   TAKE ONE TABLET BY MOUTH TWICE A DAY      carvediloL (COREG) 25 MG tablet carvedilol 25 mg tablet   Take 1 tablet twice a day by oral route.      cholecalciferol, vitamin D3, (VITAMIN D3) 25 mcg (1,000 unit) capsule 1 tablet.      ferrous gluconate (FERGON) 324 MG tablet Take by mouth.      lisinopriL 10 MG tablet Take 10 mg by mouth.      loratadine  (CLARITIN) 10 mg tablet 1 tablet.      omeprazole (PRILOSEC) 40 MG capsule Take 40 mg by mouth.      rosuvastatin (CRESTOR) 10 MG tablet rosuvastatin 10 mg tablet   Take 1 tablet every day by oral route.      valsartan-hydrochlorothiazide (DIOVAN-HCT) 320-25 mg per tablet valsartan 320 mg-hydrochlorothiazide 25 mg tablet      valsartan-hydrochlorothiazide (DIOVAN-HCT) 320-25 mg per tablet        No current facility-administered medications for this visit.       Review of patient's allergies indicates:   Allergen Reactions    Adhesive tape-silicones     Amlodipine     Labetalol     Neosporin [neomycin-bacitracin-polymyxin]          Review of systems  CONSTITUTIONAL: no fevers, no chills, no weight loss, no fatigue, no weakness  HEMATOLOGIC: no abnormal bleeding, no abnormal bruising, no drenching night sweats  ONCOLOGIC: no new masses or lumps  HEENT: no vision loss, no tinnitus or hearing loss, no nose bleeding, no dysphagia, no odynophagia  CVS: no chest pain, no palpitations, no dyspnea on exertion  RESP: no shortness of breath, no hemoptysis, no cough  GI: no nausea, no vomiting, no diarrhea, no constipation, no melena, no hematochezia, no hematemesis, no abdominal pain, no increase in abdominal girth  : no dysuria, no hematuria, no hesitancy, no scrotal swelling, no discharge  INTEGUMENT: no rashes, no abnormal bruising, no nail pitting, no hyperpigmentation  NEURO: no falls, no memory loss, no paresthesias or dysesthesias, no urofecal incontinence or retention, no loss of strength on any extremity  MSK: no back pain, no new joint pain, no joint swelling  PSYCH: no suicidal or homicidal ideation, no depression, no insomnia, no anhedonia  ENDOCRINE: no heat or cold intolerance, no polyuria, no polydipsia      Physical Exam:  Vitals:    06/19/23 1450   BP: (!) 170/79   Pulse: 62   Resp: 18   Temp: 99.1 °F (37.3 °C)       ECOG PS 0  GA: AAOx3, NAD  HEENT: NCAT,  moist oral mucous membranes  LYMPH: no cervical,  axillary or supraclavicular adenopathy  CVS: s1s2 RRR, no M/R/G  RESP: CTA b/l, no crackles, no wheezes or rhonchi  ABD: soft, NT, ND, BS+, no hepatosplenomegaly  EXT: no deformities, no pedal edema  SKIN: no rashes, warm and dry  NEURO: normal mentation, strength 5/5 on all 4 extremities, no sensory deficits        Assessment    # Normocytic anemia  Noted findings of anemia at his PCP's office, normocytic in nature, with a hemoglobin of 10.5 in February 2023  Discussed with patient the findings of anemia, etiologies including nutritional deficiency, primary bone marrow disorders and anemia of chronic disease.  Of note patient does carry a diagnosis of CKD according to notes available to us from PCP's office  Discussed the plan to obtain further blood work to narrow down patient's etiology which will dictate further treatment recommendations.    Plan   CBC, CMP, iron panel, vitamin B12, folic acid level, path review of smear, myeloma panel today.  Plan to follow-up in 4 weeks with repeat CBC    A total of  45 minutes were spent in review of records, interpretation of test, coordination of care, discussion and counseling with the patient.        Portions of the record may have been created with voice recognition software. Occasional wrong-word or sound-a-like substitutions may have occurred due to the inherent limitations of voice recognition software. Read the chart carefully and recognize, using context, where substitutions have occurred.         Another warning sign of my stresses would be that I would eat a lot.

## 2023-06-21 ENCOUNTER — APPOINTMENT (OUTPATIENT)
Dept: OPHTHALMOLOGY | Facility: CLINIC | Age: 38
End: 2023-06-21
Payer: MEDICAID

## 2023-06-21 ENCOUNTER — OUTPATIENT (OUTPATIENT)
Dept: OUTPATIENT SERVICES | Facility: HOSPITAL | Age: 38
LOS: 1 days | End: 2023-06-21
Payer: MEDICAID

## 2023-06-21 ENCOUNTER — APPOINTMENT (OUTPATIENT)
Dept: OPHTHALMOLOGY | Facility: CLINIC | Age: 38
End: 2023-06-21

## 2023-06-21 DIAGNOSIS — H53.8 OTHER VISUAL DISTURBANCES: ICD-10-CM

## 2023-06-21 PROCEDURE — 99213 OFFICE O/P EST LOW 20 MIN: CPT

## 2023-06-21 PROCEDURE — 92134 CPTRZ OPH DX IMG PST SGM RTA: CPT

## 2023-06-21 PROCEDURE — 92134 CPTRZ OPH DX IMG PST SGM RTA: CPT | Mod: 26

## 2023-06-21 NOTE — BH INPATIENT PSYCHIATRY PROGRESS NOTE - NSBHASSESSSUMMFT_PSY_ALL_CORE
Detail Level: Detailed Detail Level: Zone Detail Level: Generalized Detail Level: Simple 37F who is domiciled at [facility] with mother, unemployed, single, with PMHx of T1DM, Diabetic neuropathy, HTN, chronic back pain, migraine headaches, PPHx of anxiety/depression, 5 or 6 prior lifetime psychiatric admissions for suicide attempts  (once by taking a capful of acetone, another by drinking hydrogen peroxide, another attempt by taking half a bottle of ibuprofen), and discharged earlier today form inpatient admission presenting for suicidal ideation, intent and plan. Patient does not feel safe to return home at this time. Will need to obtain collateral from inpatient attending.  Anxiety vs Mood disorder NOS

## 2023-06-22 ENCOUNTER — APPOINTMENT (OUTPATIENT)
Dept: ENDOCRINOLOGY | Facility: CLINIC | Age: 38
End: 2023-06-22
Payer: MEDICAID

## 2023-06-22 VITALS
DIASTOLIC BLOOD PRESSURE: 80 MMHG | OXYGEN SATURATION: 98 % | BODY MASS INDEX: 32.95 KG/M2 | HEART RATE: 90 BPM | WEIGHT: 193 LBS | SYSTOLIC BLOOD PRESSURE: 122 MMHG | HEIGHT: 64 IN

## 2023-06-22 PROCEDURE — 99214 OFFICE O/P EST MOD 30 MIN: CPT

## 2023-06-22 RX ORDER — GLUCAGON 1 MG
1 KIT INJECTION
Qty: 1 | Refills: 3 | Status: ACTIVE | COMMUNITY
Start: 2017-05-19 | End: 1900-01-01

## 2023-06-22 RX ORDER — INSULIN LISPRO 100 U/ML
100 INJECTION, SOLUTION INTRAVENOUS; SUBCUTANEOUS
Qty: 5 | Refills: 11 | Status: ACTIVE | COMMUNITY
Start: 2022-04-07 | End: 1900-01-01

## 2023-06-23 NOTE — DATA REVIEWED
[FreeTextEntry1] : previous data and record reviewed \par 1/15/21:  hba1c 8.7% glucose 203  LDL 116crea 0.7  AST 15  ALT 18  hb 13.6 \par 6/1/21: Hba1c 8.9% glucose 283  crea0.62  \par 10/4/21: HBA1c 10.8% glucose 190   TSH 1.310  ft4 1.22 ALT 56 \par \par 9/30/22: HBA1c 9.9%   hb 8.3 crea 0.63    alk phos 138  alb/crea  39  25 vit D 25.2 TSH 1.02\par \par 1/2023:A1c 9.8%  hb 9.6  glucose 355 crea 0.63   AST 29  ALT 52     ACR 24  TSH 0.791 fructosamine 440 b12 715 \par \par 6/2023: A1c 10.7 % glucose 276 crea 0.7 gfr 113 AST 27 ALT 61

## 2023-06-23 NOTE — REVIEW OF SYSTEMS
[Palpitations] : palpitations [Depression] : depression [Anxiety] : anxiety [Stress] : stress [As Noted in HPI] : as noted in HPI [Fatigue] : no fatigue [Decreased Appetite] : appetite not decreased [Recent Weight Gain (___ Lbs)] : no recent weight gain [Recent Weight Loss (___ Lbs)] : no recent weight loss [Visual Field Defect] : no visual field defect [Blurred Vision] : no blurred vision [Dysphagia] : no dysphagia [Dysphonia] : no dysphonia [Shortness Of Breath] : no shortness of breath [Cough] : no cough [Orthopnea] : no orthopnea [PND] : no Paroxysmal Nocturnal Dyspnea [Nausea] : no nausea [Constipation] : no constipation [Vomiting] : no vomiting [Diarrhea] : no diarrhea [Nocturia] : no nocturia [Acanthosis] : no acanthosis  [Acne] : no acne [Dry Skin] : no dry skin [Hirsutism] : no hirsutism [Headaches] : no headaches [Tremors] : no tremors [Suicidal Ideation] : no suicidal ideation [Cold Intolerance] : no cold intolerance [Heat Intolerance] : no heat intolerance [de-identified] : had suicidal ideation for which she was hospitalized and now better

## 2023-06-23 NOTE — ASSESSMENT
[Diabetes Foot Care] : diabetes foot care [Long Term Vascular Complications] : long term vascular complications of diabetes [Carbohydrate Consistent Diet] : carbohydrate consistent diet [Importance of Diet and Exercise] : importance of diet and exercise to improve glycemic control, achieve weight loss and improve cardiovascular health [Exercise/Effect on Glucose] : exercise/effect on glucose [Hypoglycemia Management] : hypoglycemia management [Action and use of Insulin] : action and use of short and long-acting insulin [Self Monitoring of Blood Glucose] : self monitoring of blood glucose [Insulin Self-Administration] : insulin self-administration [Injection Technique, Storage, Sharps Disposal] : injection technique, storage, and sharps disposal [Weight Loss] : weight loss [FreeTextEntry1] : 38  year old patient with type 1 DM on insulin pump presents for  follow up \par \par \par #poorly controlled type 1 DM  on insulin pump Medtronic 630 G \par - log reviewed , pattern of hyperglycemia pre and post meals , attributed  to non compliance with diet , per mother and mario , she was not watching her carbs and eating snack \par \par changed rates and I: C \par \par Start Time: 12 am Basal: 1.9 units/hour \par Start Time: 6 am Basal: 1.8 units/hour...1.9  \par Start Time: 12pm Basal: 2.3 units/hour       \par Carb Ratios \par Start Time: 12am Carb Ratios: 10 grams/unit \par Start Time: 9 am Carb Ratios: 12 grams/unit ...1: 10 g \par Start Time: 2pm Carb Ratios: 9 grams/unit       \par Sensitvity \par Start Time: Sensitivity: 60 mg/dL/U         \par BG Target Ranges \par Start Time: BG Target Ranges: 120 mg/dL  \par \par - uptodate with eye exam\par - periods regular, has hirsutism total test ok following with GYn, might benefit from metformin in the future , prefer not to start \par - LDL ok , will benefit from statin if higher given long standing type 1 DM \par - following with podiatry/ psychiatry \par \par \par \par \par

## 2023-06-23 NOTE — PHYSICAL EXAM
[Alert] : alert [Well Nourished] : well nourished [Healthy Appearance] : healthy appearance [No Acute Distress] : no acute distress [No Proptosis] : no proptosis [No Lid Lag] : no lid lag [No LAD] : no lymphadenopathy [Thyroid Not Enlarged] : the thyroid was not enlarged [No Thyroid Nodules] : no palpable thyroid nodules [No Accessory Muscle Use] : no accessory muscle use [Clear to Auscultation] : lungs were clear to auscultation bilaterally [Normal S1, S2] : normal S1 and S2 [No Murmurs] : no murmurs [Normal Rate] : heart rate was normal [Soft] : abdomen soft [No CVA Tenderness] : no ~M costovertebral angle tenderness [No Stigmata of Cushings Syndrome] : no stigmata of Cushings Syndrome [Normal Reflexes] : deep tendon reflexes were 2+ and symmetric [Oriented x3] : oriented to person, place, and time [Abdominal Striae] : no abdominal striae [Acanthosis Nigricans] : no acanthosis nigricans [Hirsutism] : no hirsutism [de-identified] : [place] hump

## 2023-06-26 ENCOUNTER — NON-APPOINTMENT (OUTPATIENT)
Age: 38
End: 2023-06-26

## 2023-06-26 ENCOUNTER — OUTPATIENT (OUTPATIENT)
Dept: OUTPATIENT SERVICES | Facility: HOSPITAL | Age: 38
LOS: 1 days | End: 2023-06-26
Payer: MEDICAID

## 2023-06-26 ENCOUNTER — APPOINTMENT (OUTPATIENT)
Dept: PSYCHIATRY | Facility: CLINIC | Age: 38
End: 2023-06-26

## 2023-06-26 DIAGNOSIS — F60.3 BORDERLINE PERSONALITY DISORDER: ICD-10-CM

## 2023-06-26 DIAGNOSIS — F41.1 GENERALIZED ANXIETY DISORDER: ICD-10-CM

## 2023-06-26 PROCEDURE — 90832 PSYTX W PT 30 MINUTES: CPT

## 2023-06-26 NOTE — REASON FOR VISIT
[Patient] : Patient [FreeTextEntry1] : Patient is a 38 year old female seen for a followup therapy session, 12:15 PM to 12:50 PM
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
## 2023-06-26 NOTE — PHYSICAL EXAM
[Cooperative] : cooperative [Euthymic] : euthymic [Anxious] : anxious [Full] : full [Clear] : clear [Linear/Goal Directed] : linear/goal directed [Average] : average [WNL] : within normal limits [de-identified] : Fair

## 2023-06-27 ENCOUNTER — OUTPATIENT (OUTPATIENT)
Dept: OUTPATIENT SERVICES | Facility: HOSPITAL | Age: 38
LOS: 1 days | End: 2023-06-27
Payer: MEDICAID

## 2023-06-27 ENCOUNTER — RESULT REVIEW (OUTPATIENT)
Age: 38
End: 2023-06-27

## 2023-06-27 DIAGNOSIS — Z00.8 ENCOUNTER FOR OTHER GENERAL EXAMINATION: ICD-10-CM

## 2023-06-27 DIAGNOSIS — R07.9 CHEST PAIN, UNSPECIFIED: ICD-10-CM

## 2023-06-27 DIAGNOSIS — F60.3 BORDERLINE PERSONALITY DISORDER: ICD-10-CM

## 2023-06-27 DIAGNOSIS — F41.1 GENERALIZED ANXIETY DISORDER: ICD-10-CM

## 2023-06-27 PROCEDURE — 71250 CT THORAX DX C-: CPT

## 2023-06-27 PROCEDURE — 71250 CT THORAX DX C-: CPT | Mod: 26

## 2023-06-28 DIAGNOSIS — R07.9 CHEST PAIN, UNSPECIFIED: ICD-10-CM

## 2023-06-29 ENCOUNTER — OUTPATIENT (OUTPATIENT)
Dept: OUTPATIENT SERVICES | Facility: HOSPITAL | Age: 38
LOS: 1 days | End: 2023-06-29
Payer: MEDICAID

## 2023-06-29 ENCOUNTER — APPOINTMENT (OUTPATIENT)
Dept: PSYCHIATRY | Facility: CLINIC | Age: 38
End: 2023-06-29
Payer: MEDICAID

## 2023-06-29 DIAGNOSIS — F41.1 GENERALIZED ANXIETY DISORDER: ICD-10-CM

## 2023-06-29 PROCEDURE — 99213 OFFICE O/P EST LOW 20 MIN: CPT | Mod: 95

## 2023-06-29 PROCEDURE — ZZZZZ: CPT

## 2023-06-29 NOTE — PHYSICAL EXAM
[Cooperative] : cooperative [Euthymic] : euthymic [Full] : full [Clear] : clear [Linear/Goal Directed] : linear/goal directed [WNL] : within normal limits [Average] : average [FreeTextEntry2] : uses a walker  [FreeTextEntry5] : dispersed red spots noted on chest and back, nontender, not confluent, no blistering  [de-identified] : Fair [de-identified] : Fair

## 2023-06-29 NOTE — DISCUSSION/SUMMARY
[FreeTextEntry1] : Ms. Ania Mei is a 37yo single  female., domiciled at [facility], [facility] DM type 1, DM neuropathy, fatty liver, migraine HA, pseudoseizures, pph ARMAAN, cluster B, 6 prior [facility] admissions most recenlty 6/2023, 4/2023, 4 prior SA (drinking capeful of acetone, drinking hydrogen peroxide, taking 1/2 bottle ibuprofen, and drinking pinesol/bleach).   \par Ania most recently  reported severe severe stressors sine beginning of 2020 , including homelessness , changes in living situation, stress over the covid pandemic with losses within the family including his father on 11/28/22.  Ania reported increased of anxiety with episodes of falling, generalized shakiness, head pressure and inability to move, patients EEG and video EEG were reportedly negative for any seizure activity. \par \par On visit today, pt reports that she continues to grief the loss of her father. No SI since d/c from [facility]. Discussed coping skills for suicidal thoughts should they return. Will continue current regimen which she has been tolerating overall.  No homicidal thoughts. No overt psychosis or alvina on exam. Patient has appropriate protective factors in place. Is engaged in his treatment. No acute safety concerns. Outpatient level of care remains appropriate.\par \par \par Pt is not an imminent risk to self. She remains at a chronically elevated risks due to history of previous suicidal gestures and attempts, history of hospitalization, mood deregulation, hx of impulsivity. This however is mitigated by future talk, no current SI w/ intent or plan, identifies reasons for living, engaged in outpatient treatment, living in closely monitored facility. Furthermore, pt chronic risk will not be mitigated by inpatient hospitalization. She is not an imminent risk to self/other and outpatient level of care remains appropriate. \par  \par Safety plan discussed at lengths w/ pt/mother. Pt reports that she will call 911 or go to the nearest Emergency room should she become an imminent risk to self/other. Pt will also utilize suicide hotline number provided. \par \par current diagnostic impression: somatic symptom disorder, ARMAAN, cluster B traits r/o dependent personality disorder \par \par continue lamictal 150mg  \par Escitalopram 20mg po daily\par continue individual therapy w/ Suzan \par RTC 2-4 weeks \par -TRANSITION OF CARE DISCUSSED \par

## 2023-06-29 NOTE — HISTORY OF PRESENT ILLNESS
[No] : no [FreeTextEntry1] : \par  [FreeTextEntry2] : five inpatient admissions, most recently 4/2023 for SI, had also reported SA by drinking bleach/pinsole after father's death in 11/2022; prior to this patient has history of 3 remote SA in her teens (approx. 16/18 yo, reports taking capeful acetone, drinking hydrogen peroxide, and half bottle of ibuprofen)\par multiple failed medication trials \par multiple medical ED visits for pseudoseizures and other medical complaints \par  [FreeTextEntry3] : celexa\par cymbalta (not effective)\par zyprexa\par buspar (not effective)\par gabapentin (hives)\par melatonin  [de-identified] : \par

## 2023-06-29 NOTE — SOCIAL HISTORY
[Assisted Living Facility] : lives in an assisted living facility [Disabled] : disabled [Never ] : never  [High School] : high school [None] : none [No Known Substance Use] : no known substance use [Yes] : yes [No Known Use] : no known use [FreeTextEntry1] : [facility] for the past 2.5yrs [TextBox_52] : Prescribed

## 2023-06-30 ENCOUNTER — NON-APPOINTMENT (OUTPATIENT)
Age: 38
End: 2023-06-30

## 2023-06-30 DIAGNOSIS — F41.1 GENERALIZED ANXIETY DISORDER: ICD-10-CM

## 2023-06-30 DIAGNOSIS — E11.9 TYPE 2 DIABETES MELLITUS WITHOUT COMPLICATIONS: ICD-10-CM

## 2023-06-30 DIAGNOSIS — H35.373 PUCKERING OF MACULA, BILATERAL: ICD-10-CM

## 2023-07-02 NOTE — ED PROVIDER NOTE - CARE PLAN
Patient/Caregiver provided printed discharge information.
Principal Discharge DX:	Muscle pain, lumbar  Secondary Diagnosis:	Urinary symptom or sign  Secondary Diagnosis:	Fall

## 2023-07-06 ENCOUNTER — NON-APPOINTMENT (OUTPATIENT)
Age: 38
End: 2023-07-06

## 2023-07-10 ENCOUNTER — EMERGENCY (EMERGENCY)
Facility: HOSPITAL | Age: 38
LOS: 0 days | Discharge: HOME | End: 2023-07-10
Attending: EMERGENCY MEDICINE
Payer: MEDICAID

## 2023-07-10 ENCOUNTER — APPOINTMENT (OUTPATIENT)
Dept: PSYCHIATRY | Facility: CLINIC | Age: 38
End: 2023-07-10

## 2023-07-10 VITALS
SYSTOLIC BLOOD PRESSURE: 144 MMHG | HEART RATE: 96 BPM | HEIGHT: 64 IN | DIASTOLIC BLOOD PRESSURE: 89 MMHG | TEMPERATURE: 98 F | WEIGHT: 197.09 LBS | RESPIRATION RATE: 18 BRPM | OXYGEN SATURATION: 97 %

## 2023-07-10 VITALS — HEART RATE: 95 BPM

## 2023-07-10 DIAGNOSIS — F32.A DEPRESSION, UNSPECIFIED: ICD-10-CM

## 2023-07-10 DIAGNOSIS — M79.10 MYALGIA, UNSPECIFIED SITE: ICD-10-CM

## 2023-07-10 DIAGNOSIS — R10.9 UNSPECIFIED ABDOMINAL PAIN: ICD-10-CM

## 2023-07-10 DIAGNOSIS — Z88.0 ALLERGY STATUS TO PENICILLIN: ICD-10-CM

## 2023-07-10 DIAGNOSIS — R19.7 DIARRHEA, UNSPECIFIED: ICD-10-CM

## 2023-07-10 DIAGNOSIS — Z88.1 ALLERGY STATUS TO OTHER ANTIBIOTIC AGENTS STATUS: ICD-10-CM

## 2023-07-10 DIAGNOSIS — R05.9 COUGH, UNSPECIFIED: ICD-10-CM

## 2023-07-10 DIAGNOSIS — K21.9 GASTRO-ESOPHAGEAL REFLUX DISEASE WITHOUT ESOPHAGITIS: ICD-10-CM

## 2023-07-10 DIAGNOSIS — Z88.7 ALLERGY STATUS TO SERUM AND VACCINE: ICD-10-CM

## 2023-07-10 DIAGNOSIS — Z88.8 ALLERGY STATUS TO OTHER DRUGS, MEDICAMENTS AND BIOLOGICAL SUBSTANCES: ICD-10-CM

## 2023-07-10 DIAGNOSIS — E11.9 TYPE 2 DIABETES MELLITUS WITHOUT COMPLICATIONS: ICD-10-CM

## 2023-07-10 DIAGNOSIS — K76.0 FATTY (CHANGE OF) LIVER, NOT ELSEWHERE CLASSIFIED: ICD-10-CM

## 2023-07-10 DIAGNOSIS — Z86.16 PERSONAL HISTORY OF COVID-19: ICD-10-CM

## 2023-07-10 DIAGNOSIS — F41.9 ANXIETY DISORDER, UNSPECIFIED: ICD-10-CM

## 2023-07-10 DIAGNOSIS — Z87.19 PERSONAL HISTORY OF OTHER DISEASES OF THE DIGESTIVE SYSTEM: ICD-10-CM

## 2023-07-10 LAB
ALBUMIN SERPL ELPH-MCNC: 4.2 G/DL — SIGNIFICANT CHANGE UP (ref 3.5–5.2)
ALP SERPL-CCNC: 163 U/L — HIGH (ref 30–115)
ALT FLD-CCNC: 61 U/L — HIGH (ref 0–41)
ANION GAP SERPL CALC-SCNC: 12 MMOL/L — SIGNIFICANT CHANGE UP (ref 7–14)
AST SERPL-CCNC: 40 U/L — SIGNIFICANT CHANGE UP (ref 0–41)
B-OH-BUTYR SERPL-SCNC: <0.2 MMOL/L — SIGNIFICANT CHANGE UP
BASE EXCESS BLDV CALC-SCNC: 4.9 MMOL/L — HIGH (ref -2–3)
BASOPHILS # BLD AUTO: 0.06 K/UL — SIGNIFICANT CHANGE UP (ref 0–0.2)
BASOPHILS NFR BLD AUTO: 0.6 % — SIGNIFICANT CHANGE UP (ref 0–1)
BILIRUB SERPL-MCNC: <0.2 MG/DL — SIGNIFICANT CHANGE UP (ref 0.2–1.2)
BUN SERPL-MCNC: 9 MG/DL — LOW (ref 10–20)
CA-I SERPL-SCNC: 1.19 MMOL/L — SIGNIFICANT CHANGE UP (ref 1.15–1.33)
CALCIUM SERPL-MCNC: 9.2 MG/DL — SIGNIFICANT CHANGE UP (ref 8.4–10.5)
CHLORIDE SERPL-SCNC: 98 MMOL/L — SIGNIFICANT CHANGE UP (ref 98–110)
CO2 SERPL-SCNC: 26 MMOL/L — SIGNIFICANT CHANGE UP (ref 17–32)
CREAT SERPL-MCNC: 0.6 MG/DL — LOW (ref 0.7–1.5)
EGFR: 118 ML/MIN/1.73M2 — SIGNIFICANT CHANGE UP
EOSINOPHIL # BLD AUTO: 0.09 K/UL — SIGNIFICANT CHANGE UP (ref 0–0.7)
EOSINOPHIL NFR BLD AUTO: 0.9 % — SIGNIFICANT CHANGE UP (ref 0–8)
GAS PNL BLDV: 130 MMOL/L — LOW (ref 136–145)
GAS PNL BLDV: SIGNIFICANT CHANGE UP
GLUCOSE SERPL-MCNC: 275 MG/DL — HIGH (ref 70–99)
HCG SERPL QL: NEGATIVE — SIGNIFICANT CHANGE UP
HCO3 BLDV-SCNC: 32 MMOL/L — HIGH (ref 22–29)
HCT VFR BLD CALC: 41.5 % — SIGNIFICANT CHANGE UP (ref 37–47)
HCT VFR BLDA CALC: 42 % — SIGNIFICANT CHANGE UP (ref 39–51)
HGB BLD CALC-MCNC: 14.1 G/DL — SIGNIFICANT CHANGE UP (ref 12.6–17.4)
HGB BLD-MCNC: 13.8 G/DL — SIGNIFICANT CHANGE UP (ref 12–16)
IMM GRANULOCYTES NFR BLD AUTO: 0.7 % — HIGH (ref 0.1–0.3)
LACTATE BLDV-MCNC: 1.5 MMOL/L — SIGNIFICANT CHANGE UP (ref 0.5–2)
LIDOCAIN IGE QN: 12 U/L — SIGNIFICANT CHANGE UP (ref 7–60)
LYMPHOCYTES # BLD AUTO: 1.89 K/UL — SIGNIFICANT CHANGE UP (ref 1.2–3.4)
LYMPHOCYTES # BLD AUTO: 19.2 % — LOW (ref 20.5–51.1)
MCHC RBC-ENTMCNC: 29.6 PG — SIGNIFICANT CHANGE UP (ref 27–31)
MCHC RBC-ENTMCNC: 33.3 G/DL — SIGNIFICANT CHANGE UP (ref 32–37)
MCV RBC AUTO: 89.1 FL — SIGNIFICANT CHANGE UP (ref 81–99)
MONOCYTES # BLD AUTO: 0.61 K/UL — HIGH (ref 0.1–0.6)
MONOCYTES NFR BLD AUTO: 6.2 % — SIGNIFICANT CHANGE UP (ref 1.7–9.3)
NEUTROPHILS # BLD AUTO: 7.13 K/UL — HIGH (ref 1.4–6.5)
NEUTROPHILS NFR BLD AUTO: 72.4 % — SIGNIFICANT CHANGE UP (ref 42.2–75.2)
NRBC # BLD: 0 /100 WBCS — SIGNIFICANT CHANGE UP (ref 0–0)
PCO2 BLDV: 55 MMHG — HIGH (ref 39–42)
PH BLDV: 7.37 — SIGNIFICANT CHANGE UP (ref 7.32–7.43)
PLATELET # BLD AUTO: 400 K/UL — SIGNIFICANT CHANGE UP (ref 130–400)
PMV BLD: 9.7 FL — SIGNIFICANT CHANGE UP (ref 7.4–10.4)
PO2 BLDV: 23 MMHG — SIGNIFICANT CHANGE UP
POTASSIUM BLDV-SCNC: 5.6 MMOL/L — HIGH (ref 3.5–5.1)
POTASSIUM SERPL-MCNC: 5.3 MMOL/L — HIGH (ref 3.5–5)
POTASSIUM SERPL-SCNC: 5.3 MMOL/L — HIGH (ref 3.5–5)
PROT SERPL-MCNC: 7.2 G/DL — SIGNIFICANT CHANGE UP (ref 6–8)
RBC # BLD: 4.66 M/UL — SIGNIFICANT CHANGE UP (ref 4.2–5.4)
RBC # FLD: 14.1 % — SIGNIFICANT CHANGE UP (ref 11.5–14.5)
SAO2 % BLDV: 34.9 % — SIGNIFICANT CHANGE UP
SODIUM SERPL-SCNC: 136 MMOL/L — SIGNIFICANT CHANGE UP (ref 135–146)
WBC # BLD: 9.85 K/UL — SIGNIFICANT CHANGE UP (ref 4.8–10.8)
WBC # FLD AUTO: 9.85 K/UL — SIGNIFICANT CHANGE UP (ref 4.8–10.8)

## 2023-07-10 PROCEDURE — 84132 ASSAY OF SERUM POTASSIUM: CPT

## 2023-07-10 PROCEDURE — 80053 COMPREHEN METABOLIC PANEL: CPT

## 2023-07-10 PROCEDURE — 36415 COLL VENOUS BLD VENIPUNCTURE: CPT

## 2023-07-10 PROCEDURE — 83690 ASSAY OF LIPASE: CPT

## 2023-07-10 PROCEDURE — 82803 BLOOD GASES ANY COMBINATION: CPT

## 2023-07-10 PROCEDURE — 85014 HEMATOCRIT: CPT

## 2023-07-10 PROCEDURE — 84295 ASSAY OF SERUM SODIUM: CPT

## 2023-07-10 PROCEDURE — 82330 ASSAY OF CALCIUM: CPT

## 2023-07-10 PROCEDURE — 99285 EMERGENCY DEPT VISIT HI MDM: CPT

## 2023-07-10 PROCEDURE — 82962 GLUCOSE BLOOD TEST: CPT

## 2023-07-10 PROCEDURE — 83605 ASSAY OF LACTIC ACID: CPT

## 2023-07-10 PROCEDURE — 85025 COMPLETE CBC W/AUTO DIFF WBC: CPT

## 2023-07-10 PROCEDURE — 99284 EMERGENCY DEPT VISIT MOD MDM: CPT | Mod: 25

## 2023-07-10 PROCEDURE — 74177 CT ABD & PELVIS W/CONTRAST: CPT | Mod: MA

## 2023-07-10 PROCEDURE — 85018 HEMOGLOBIN: CPT

## 2023-07-10 PROCEDURE — 74177 CT ABD & PELVIS W/CONTRAST: CPT | Mod: 26,MA

## 2023-07-10 PROCEDURE — 84703 CHORIONIC GONADOTROPIN ASSAY: CPT

## 2023-07-10 PROCEDURE — 82010 KETONE BODYS QUAN: CPT

## 2023-07-10 RX ORDER — METOPROLOL TARTRATE 50 MG
25 TABLET ORAL ONCE
Refills: 0 | Status: COMPLETED | OUTPATIENT
Start: 2023-07-10 | End: 2023-07-10

## 2023-07-10 RX ORDER — LAMOTRIGINE 25 MG/1
150 TABLET, ORALLY DISINTEGRATING ORAL ONCE
Refills: 0 | Status: COMPLETED | OUTPATIENT
Start: 2023-07-10 | End: 2023-07-10

## 2023-07-10 RX ORDER — FAMOTIDINE 10 MG/ML
20 INJECTION INTRAVENOUS ONCE
Refills: 0 | Status: COMPLETED | OUTPATIENT
Start: 2023-07-10 | End: 2023-07-10

## 2023-07-10 RX ORDER — ACETAMINOPHEN 500 MG
650 TABLET ORAL ONCE
Refills: 0 | Status: COMPLETED | OUTPATIENT
Start: 2023-07-10 | End: 2023-07-10

## 2023-07-10 RX ORDER — SODIUM CHLORIDE 9 MG/ML
1000 INJECTION, SOLUTION INTRAVENOUS ONCE
Refills: 0 | Status: DISCONTINUED | OUTPATIENT
Start: 2023-07-10 | End: 2023-07-10

## 2023-07-10 RX ORDER — IBUPROFEN 200 MG
600 TABLET ORAL ONCE
Refills: 0 | Status: COMPLETED | OUTPATIENT
Start: 2023-07-10 | End: 2023-07-10

## 2023-07-10 RX ORDER — AMITRIPTYLINE HCL 25 MG
75 TABLET ORAL ONCE
Refills: 0 | Status: DISCONTINUED | OUTPATIENT
Start: 2023-07-10 | End: 2023-07-10

## 2023-07-10 RX ORDER — SODIUM CHLORIDE 9 MG/ML
1000 INJECTION INTRAMUSCULAR; INTRAVENOUS; SUBCUTANEOUS ONCE
Refills: 0 | Status: COMPLETED | OUTPATIENT
Start: 2023-07-10 | End: 2023-07-10

## 2023-07-10 RX ADMIN — Medication 650 MILLIGRAM(S): at 19:38

## 2023-07-10 RX ADMIN — Medication 25 MILLIGRAM(S): at 22:18

## 2023-07-10 RX ADMIN — FAMOTIDINE 20 MILLIGRAM(S): 10 INJECTION INTRAVENOUS at 19:38

## 2023-07-10 RX ADMIN — LAMOTRIGINE 150 MILLIGRAM(S): 25 TABLET, ORALLY DISINTEGRATING ORAL at 22:18

## 2023-07-10 RX ADMIN — SODIUM CHLORIDE 1000 MILLILITER(S): 9 INJECTION INTRAMUSCULAR; INTRAVENOUS; SUBCUTANEOUS at 20:38

## 2023-07-10 RX ADMIN — Medication 600 MILLIGRAM(S): at 19:38

## 2023-07-10 NOTE — ED ADULT NURSE NOTE - OBJECTIVE STATEMENT
Pt BIBA from [facility] c/o "stomach flu"  states she was having diarrhea all day yesterday and today has abd cramping and body aches.   Denies n/v/fevers.

## 2023-07-10 NOTE — ED PROVIDER NOTE - CARE PLAN
Principal Discharge DX:	Diarrhea  Secondary Diagnosis:	Body aches   1 Principal Discharge DX:	Diarrhea  Secondary Diagnosis:	Body aches  Secondary Diagnosis:	Fatty liver

## 2023-07-10 NOTE — ED PROVIDER NOTE - NSFOLLOWUPINSTRUCTIONS_ED_ALL_ED_FT
You were evaluated in the Emergency Department today, and your visit did not reveal anything immediately life-threatening.    However, it is important that you follow-up with your PRIMARY CARE PHYSICIAN within 3-5 days.  ------------------------------------------------------------------------------------------------------------------------  For pain, you may take the following over-the-counter medication(s):  - Acetaminophen (Tylenol, Midol) 650-1000 mg up to every 4-6 hours, as needed (max 4000mg/24hrs), AND/OR  - Ibuprofen (Motrin, Advil) 400-600 mg up to every 6-8 hours, as needed (max 3200mg/24hrs)  ------------------------------------------------------------------------------------------------------------------------  Diarrhea    Diarrhea is frequent loose or watery bowel movements that has many causes. Diarrhea can make you feel weak and cause you to become dehydrated. Diarrhea typically lasts 2–3 days, but can last longer if it is a sign of something more serious. Drink clear fluids to prevent dehydration. Eat bland, easy-to-digest foods as tolerated.     SEEK IMMEDIATE MEDICAL CARE IF YOU HAVE ANY OF THE FOLLOWING SYMPTOMS: high fevers, lightheadedness/dizziness, chest pain, black or bloody stools, shortness of breath, severe abdominal or back pain, or any signs of dehydration.

## 2023-07-10 NOTE — ED ADULT TRIAGE NOTE - CHIEF COMPLAINT QUOTE
Pt BIBA from [facility] c/o "stomach flu"  states she was having diarrhea all day yesterday and today has abd cramping and body aches.   Denies n/v/fevers.

## 2023-07-10 NOTE — ED PROVIDER NOTE - PATIENT PORTAL LINK FT
You can access the FollowMyHealth Patient Portal offered by [facility] by registering at the following website: [email or web address]. By joining [employer]’s FollowMyHealth portal, you will also be able to view your health information using other applications (apps) compatible with our system.

## 2023-07-10 NOTE — ED PROVIDER NOTE - PHYSICAL EXAMINATION
_  Vital signs reviewed; ABCs intact  GENERAL: Well nourished, comfortable  SKIN: Warm, dry  HEAD & NECK: NCAT, supple neck  EYES: EOMI, PER B/L, no scleral icterus  ENT: MMM  CARD: RRR, S1, S2; no murmurs, no rubs, no gallops  RESP: Normal respiratory effort, CTAB, no rales, no wheezing  ABD: Soft, ND, NT, no rebound, no guarding; no CVAT  NEUROMSK: Grossly intact  PSYCH: Awake, alert, cooperative, appropriate

## 2023-07-10 NOTE — ED ADULT NURSE NOTE - CHIEF COMPLAINT QUOTE
Pt BIBA from [facility] c/o "stomach flu"  states she was having diarrhea all day yesterday and today has abd cramping and body aches.   Denies n/v/fevers.

## 2023-07-10 NOTE — ED PROVIDER NOTE - ATTENDING CONTRIBUTION TO CARE
38-year-old female from [facility] with past medical history of depression, anxiety, diabetes (on insulin pump), GERD, angina, lung nodule, frequent cough s/p COVID, presents to the ER for history of watery/profuse diarrhea since yesterday.  Patient states symptoms began yesterday, not associated with any bad food intake or any sick contacts.  States she has watery stools essentially all day yesterday, and 3 times today.  Denies any blood in the stool at present.  Felt that she might have noticed a little bit of blood yesterday however her mother (who is her roommate at [employer]) looked at the toilet bowl and says she had no blood in there.  Patient states that she has a little nausea associated with this, a lot of cramping all over her body including her abdomen.  Denies any dysuria/hematuria/burning with urination, denies any fever/chills/shortness of breath/chest pain/headache/neck stiffness/leg swelling/rash.  States she has had colitis twice before and this feels very similar.  As per patient and her mother who is at the bedside she has been eating normally and drinking normally for the past 2 days.    On exam patient appears very anxious, but not in any severe distress.  NCAT, PERRL, EOMI.  Lungs: CTA B.  Heart: Regular S1-S2.  Abdomen: Soft, minimal soreness diffusely which she attributes to her cramping, + bowel sounds normal, no mass, no rebound or guarding.  EXT: No pedal edema, no calf tenderness, distal pulses intact    A/P patient has diarrhea that is now improving.  Does still have cramping.  We will check labs, give IV fluids, patient feels she has heartburn symptoms and given Pepcid.  Will reassess after IV hydration as that may improve her symptomology.  Given the patient feels this is very similar to her previous colitis will likely go home with some with Cipro and Flagyl to cover her for clinical colitis.  Will reassess in the ED.    ALL: multiple see EMR  Meds: insulin pump, feosol, lexapro, omeprazole, lopressor, elavil, lamotrigine, proventil mdi, atarax, zofran, ibuporfen, tylenol,   SH no smoking or etoh  PMD Martha Robertson  [facility] (PHARMACY) 38-year-old female from [facility] with past medical history of depression, anxiety, diabetes (on insulin pump), GERD, angina, lung nodule, frequent cough s/p COVID, presents to the ER for history of watery/profuse diarrhea since yesterday.  Patient states symptoms began yesterday, not associated with any bad food intake or any sick contacts.  States she has watery stools essentially all day yesterday, and 3 times today.  Denies any blood in the stool at present.  Felt that she might have noticed a little bit of blood yesterday however her mother (who is her roommate at [employer]) looked at the toilet bowl and says she had no blood in there.  Patient states that she has a little nausea associated with this, a lot of cramping all over her body including her abdomen.  Denies any dysuria/hematuria/burning with urination, denies any fever/chills/shortness of breath/chest pain/headache/neck stiffness/leg swelling/rash.  States she has had colitis twice before and this feels very similar.  As per patient and her mother who is at the bedside she has been eating normally and drinking normally for the past 2 days.    On exam patient appears very anxious, but not in any severe distress.  NCAT, PERRL, EOMI.  Lungs: CTA B.  Heart: Regular S1-S2.  Abdomen: Soft, minimal soreness diffusely which she attributes to her cramping, + bowel sounds normal, no mass, no rebound or guarding.  EXT: No pedal edema, no calf tenderness, distal pulses intact    A/P patient has diarrhea that is now improving.  Does still have cramping.  We will check labs, give IV fluids, patient feels she has heartburn symptoms and given Pepcid.  Will reassess after IV hydration as that may improve her symptomology.  Given the patient feels this is very similar to her previous colitis will likely get a CT scan to r/o diverticulitis/abscess/colitis which may require ABX.  Will reassess in the ED.    ALL: multiple see EMR  Meds: insulin pump, feosol, lexapro, omeprazole, lopressor, elavil, lamotrigine, proventil mdi, atarax, zofran, ibuporfen, tylenol,   SH no smoking or etoh  PMD Martha Robertson  [facility] (PHARMACY)

## 2023-07-10 NOTE — ED PROVIDER NOTE - CARE PROVIDER_API CALL
Leonardo Mejia  Gastroenterology  [address] [place] [id number]  Phone: [phone]  Fax: [phone]  Follow Up Time:    Leonardo Mejia  Gastroenterology  [address] [place] [address]  Phone: [phone]  Fax: [phone]  Follow Up Time:     Martha Robertson  Internal Medicine  [address] [place] [id number]  Phone: [phone]  Fax: [phone]  Follow Up Time: 7-10 Days

## 2023-07-10 NOTE — ED PROVIDER NOTE - PROVIDER TOKENS
PROVIDER:[TOKEN:[[id number]]] PROVIDER:[TOKEN:[[id number]]],PROVIDER:[TOKEN:[[id number]],FOLLOWUP:[7-10 Days]]

## 2023-07-10 NOTE — ED PROVIDER NOTE - OBJECTIVE STATEMENT
Patient is a 38 year old woman, with a history of depression, anxiety, diabetes, GERD, presenting Patient is a 38 year old woman, with a history of depression, anxiety, diabetes, GERD, presenting for abdominal cramping and diarrhea since yesterday morning.  Patient has associated body aches. Patient states that multiple people at the assisted living facility (where she resides) have been having viral gastroenteritis.  No fever, recent travel.  No vomiting.  No urinary symptoms, including dysuria, frequency, urgency, hematuria.  No vaginal discharge.  Diarrhea is nonbloody, too many to count episodes today.

## 2023-07-10 NOTE — ED PROVIDER NOTE - CLINICAL SUMMARY MEDICAL DECISION MAKING FREE TEXT BOX
38-year-old female from [facility] with past medical history of depression, anxiety, diabetes (on insulin pump), GERD, angina, lung nodule, frequent cough s/p COVID, presents to the ER for history of watery/profuse diarrhea since yesterday.  Patient states symptoms began yesterday, not associated with any bad food intake or any sick contacts.  States she has watery stools essentially all day yesterday, and 3 times today.  Denies any blood in the stool at present.  Felt that she might have noticed a little bit of blood yesterday however her mother (who is her roommate at [employer]) looked at the toilet bowl and says she had no blood in there.  Patient states that she has a little nausea associated with this, a lot of cramping all over her body including her abdomen.  Denies any dysuria/hematuria/burning with urination, denies any fever/chills/shortness of breath/chest pain/headache/neck stiffness/leg swelling/rash.  States she has had colitis twice before and this feels very similar.  As per patient and her mother who is at the bedside she has been eating normally and drinking normally for the past 2 days.    On exam patient appears very anxious, but not in any severe distress.  NCAT, PERRL, EOMI.  Lungs: CTA B.  Heart: Regular S1-S2.  Abdomen: Soft, minimal soreness diffusely which she attributes to her cramping, + bowel sounds normal, no mass, no rebound or guarding.  EXT: No pedal edema, no calf tenderness, distal pulses intact    A/P patient has diarrhea that is now improving.  Does still have cramping.  We ordered labs which showed pt not in DKA, no renal failure, no need for any electrolyte replacement. CT ordered which showed no acute pathology, but some fatty liver. Pt given test results and given her symptoms are improving (yesterday had >10 BMs, today had X 3) would recommend symptomatic treatment (ie malaax or pepto bismol prn) and follow up with PMD (has appt set up in 7 days). Return to ER for any worsening of symptoms.

## 2023-07-11 ENCOUNTER — NON-APPOINTMENT (OUTPATIENT)
Age: 38
End: 2023-07-11

## 2023-07-13 ENCOUNTER — OUTPATIENT (OUTPATIENT)
Dept: OUTPATIENT SERVICES | Facility: HOSPITAL | Age: 38
LOS: 1 days | End: 2023-07-13

## 2023-07-13 DIAGNOSIS — F45.0 SOMATIZATION DISORDER: ICD-10-CM

## 2023-07-13 DIAGNOSIS — F33.1 MAJOR DEPRESSIVE DISORDER, RECURRENT, MODERATE: ICD-10-CM

## 2023-07-17 ENCOUNTER — OUTPATIENT (OUTPATIENT)
Dept: OUTPATIENT SERVICES | Facility: HOSPITAL | Age: 38
LOS: 1 days | End: 2023-07-17
Payer: MEDICAID

## 2023-07-17 ENCOUNTER — APPOINTMENT (OUTPATIENT)
Dept: INTERNAL MEDICINE | Facility: CLINIC | Age: 38
End: 2023-07-17

## 2023-07-17 ENCOUNTER — NON-APPOINTMENT (OUTPATIENT)
Age: 38
End: 2023-07-17

## 2023-07-17 VITALS
HEART RATE: 95 BPM | BODY MASS INDEX: 33.29 KG/M2 | SYSTOLIC BLOOD PRESSURE: 119 MMHG | WEIGHT: 195 LBS | HEIGHT: 64 IN | DIASTOLIC BLOOD PRESSURE: 86 MMHG | TEMPERATURE: 97.3 F | OXYGEN SATURATION: 100 %

## 2023-07-17 DIAGNOSIS — Z87.898 PERSONAL HISTORY OF OTHER SPECIFIED CONDITIONS: ICD-10-CM

## 2023-07-17 DIAGNOSIS — D64.9 ANEMIA, UNSPECIFIED: ICD-10-CM

## 2023-07-17 DIAGNOSIS — Z00.00 ENCOUNTER FOR GENERAL ADULT MEDICAL EXAMINATION WITHOUT ABNORMAL FINDINGS: ICD-10-CM

## 2023-07-17 PROCEDURE — 99214 OFFICE O/P EST MOD 30 MIN: CPT

## 2023-07-17 RX ORDER — ESCITALOPRAM OXALATE 20 MG/1
20 TABLET ORAL
Qty: 30 | Refills: 1 | Status: DISCONTINUED | COMMUNITY
Start: 2022-11-10 | End: 2023-07-17

## 2023-07-17 RX ORDER — CLONAZEPAM 0.5 MG
0.5 TABLET,DISINTEGRATING ORAL TWICE DAILY
Refills: 0 | Status: DISCONTINUED | COMMUNITY
End: 2023-07-17

## 2023-07-17 RX ORDER — CLOTRIMAZOLE AND BETAMETHASONE DIPROPIONATE 10; .5 MG/G; MG/G
1-0.05 CREAM TOPICAL TWICE DAILY
Qty: 1 | Refills: 0 | Status: DISCONTINUED | COMMUNITY
Start: 2023-02-09 | End: 2023-07-17

## 2023-07-17 RX ORDER — ACETAMINOPHEN EXTRA STRENGTH 500 MG/1
500 TABLET ORAL
Qty: 80 | Refills: 0 | Status: DISCONTINUED | COMMUNITY
Start: 2022-10-21 | End: 2023-07-17

## 2023-07-17 RX ORDER — METRONIDAZOLE 500 MG/1
500 TABLET ORAL TWICE DAILY
Qty: 14 | Refills: 0 | Status: DISCONTINUED | COMMUNITY
Start: 2023-02-13 | End: 2023-07-17

## 2023-07-17 RX ORDER — ALBUTEROL SULFATE 90 UG/1
108 (90 BASE) INHALANT RESPIRATORY (INHALATION)
Qty: 1 | Refills: 0 | Status: DISCONTINUED | COMMUNITY
Start: 2022-10-04 | End: 2023-07-17

## 2023-07-17 RX ORDER — ALBUTEROL SULFATE 1.25 MG/3ML
1.25 SOLUTION RESPIRATORY (INHALATION)
Qty: 1 | Refills: 2 | Status: DISCONTINUED | COMMUNITY
Start: 2023-01-18 | End: 2023-07-17

## 2023-07-17 RX ORDER — MECLIZINE HYDROCHLORIDE 25 MG/1
25 TABLET ORAL
Qty: 28 | Refills: 0 | Status: DISCONTINUED | COMMUNITY
Start: 2022-06-17 | End: 2023-07-17

## 2023-07-17 NOTE — ASSESSMENT
[FreeTextEntry1] : 38 year old female with a PMH of chronic pelvic pain, Type 1 DM with chronic polyneuropathy of the lower extremities on an insulin pump, recurrent UTIs, PCOS, GERD, anxiety, and migraines presents for follow up.\par \par #Recent admission for abdominal pain - diagnosis of viral gastroenteritis - resolved \par #Recent admission for Leukocytosis, Elevated lactate, abdominal pain - resolved\par #NAFLD\par -CTAP: no GB wall stranding but fatty infiltrates of liver\par - RUQ US: no acute pathology\par - GI and Hepatologist referral\par - GI plans for EGD and colonoscopy once blood sugars are under 200 \par \par # GERD\par - Increased omeprazole from 20mg to 40 mg\par - Advised to take 2 tabs of 20mg rather than one tab\par - Once she runs out, patient can contact us for refill\par \par # pulmonary nodule\par # chronic cough\par - pulm f/u\par - albuterol PRN\par  \par #Chronic Sinus Tachycardia\par -follows Dr. Christian\par -c/w metoprolol\par -c/w ASA\par \par #Depression/Anxiety\par -c/w lexapro\par  \par #Obesity\par -wt loss advised\par \par # iron deficiency anemia\par - recent labs hemoglobin improved to 14.7\par - c/w iron supplement\par \par # type 1 DM:\par - A1c 9.4 on 12/22 --> 10.7% on 6/8/23\par - Follows up with endocrinology \par \par HCM\par - Follows GYN regularly\par - Opthalmology f/u up to date\par RTC in 6 months or PRN\par . \par

## 2023-07-17 NOTE — HISTORY OF PRESENT ILLNESS
[Parent] : parent [FreeTextEntry1] : Follow up [de-identified] : 38 year old female with a PMH of chronic pelvic pain, Type 1 DM with chronic polyneuropathy of the lower extremities on an insulin pump, recurrent UTIs, PCOS, GERD, anxiety, and migraines presents for follow up. She is seeing endocrinology for management of her DM, and also follows up with OBGYN for PCOS and GI for hepatic steatosis. Today she reports that her GERD symptoms are uncontrolled. Additionally, she does not like taking Lamictal prescribed by psychiatry, she states it was prescribed for bipolar disorder and seizures. Patient and mother state that patient has never had a seizure, only pseudoseizures. She does have daily anxiety attacks, causing her to have chest tightness, abdominal pain, and SOB.\par Her DM is uncontrolled, patient and mother live in assisted living and have no control over their meals, and is therefore unable to maintain diet.\par Patient states that she follows with podiatry, ophthalmology and has appointments lined up.

## 2023-07-17 NOTE — REVIEW OF SYSTEMS
[Palpitations] : palpitations [Shortness Of Breath] : shortness of breath [Abdominal Pain] : abdominal pain [Anxiety] : anxiety [Negative] : Heme/Lymph [FreeTextEntry4] : itchiness in left ear

## 2023-07-17 NOTE — COUNSELING
[Benefits of weight loss discussed] : Benefits of weight loss discussed [Encouraged to maintain food diary] : Encouraged to maintain food diary [Encouraged to increase physical activity] : Encouraged to increase physical activity [Good understanding] : Patient has a good understanding of disease, goals and obesity follow-up plan [Other: ____] : [unfilled] [de-identified] : Lives in assisted living, has no control over food

## 2023-07-18 DIAGNOSIS — D64.9 ANEMIA, UNSPECIFIED: ICD-10-CM

## 2023-07-18 DIAGNOSIS — F41.9 ANXIETY DISORDER, UNSPECIFIED: ICD-10-CM

## 2023-07-18 DIAGNOSIS — K21.9 GASTRO-ESOPHAGEAL REFLUX DISEASE WITHOUT ESOPHAGITIS: ICD-10-CM

## 2023-07-18 DIAGNOSIS — E11.9 TYPE 2 DIABETES MELLITUS WITHOUT COMPLICATIONS: ICD-10-CM

## 2023-07-18 DIAGNOSIS — R51.9 HEADACHE, UNSPECIFIED: ICD-10-CM

## 2023-07-19 ENCOUNTER — APPOINTMENT (OUTPATIENT)
Dept: PSYCHIATRY | Facility: CLINIC | Age: 38
End: 2023-07-19

## 2023-07-19 ENCOUNTER — OUTPATIENT (OUTPATIENT)
Dept: OUTPATIENT SERVICES | Facility: HOSPITAL | Age: 38
LOS: 1 days | End: 2023-07-19
Payer: MEDICAID

## 2023-07-19 DIAGNOSIS — F41.1 GENERALIZED ANXIETY DISORDER: ICD-10-CM

## 2023-07-19 DIAGNOSIS — F60.3 BORDERLINE PERSONALITY DISORDER: ICD-10-CM

## 2023-07-19 PROCEDURE — 90832 PSYTX W PT 30 MINUTES: CPT

## 2023-07-19 NOTE — REASON FOR VISIT
[Patient] : Patient [FreeTextEntry1] : Patient is a 38 year old female seen for a followup therapy session, 2::15 PM to 2:45 PM

## 2023-07-19 NOTE — PHYSICAL EXAM
[Cooperative] : cooperative [Euthymic] : euthymic [Anxious] : anxious [Full] : full [Clear] : clear [Linear/Goal Directed] : linear/goal directed [Average] : average [WNL] : within normal limits [de-identified] : Fair
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
## 2023-07-20 DIAGNOSIS — F41.1 GENERALIZED ANXIETY DISORDER: ICD-10-CM

## 2023-07-20 DIAGNOSIS — F60.3 BORDERLINE PERSONALITY DISORDER: ICD-10-CM

## 2023-07-27 ENCOUNTER — APPOINTMENT (OUTPATIENT)
Dept: PSYCHIATRY | Facility: CLINIC | Age: 38
End: 2023-07-27

## 2023-07-27 ENCOUNTER — APPOINTMENT (OUTPATIENT)
Dept: PSYCHIATRY | Facility: CLINIC | Age: 38
End: 2023-07-27
Payer: MEDICAID

## 2023-07-27 ENCOUNTER — OUTPATIENT (OUTPATIENT)
Dept: OUTPATIENT SERVICES | Facility: HOSPITAL | Age: 38
LOS: 1 days | End: 2023-07-27
Payer: MEDICAID

## 2023-07-27 DIAGNOSIS — F45.0 SOMATIZATION DISORDER: ICD-10-CM

## 2023-07-27 DIAGNOSIS — F39 UNSPECIFIED MOOD [AFFECTIVE] DISORDER: ICD-10-CM

## 2023-07-27 PROCEDURE — ZZZZZ: CPT

## 2023-07-27 PROCEDURE — 99213 OFFICE O/P EST LOW 20 MIN: CPT

## 2023-07-27 PROCEDURE — 90832 PSYTX W PT 30 MINUTES: CPT

## 2023-07-27 RX ORDER — LAMOTRIGINE 150 MG/1
150 TABLET ORAL
Qty: 30 | Refills: 1 | Status: DISCONTINUED | COMMUNITY
Start: 2023-05-03 | End: 2023-07-27

## 2023-07-27 NOTE — DISCUSSION/SUMMARY
[FreeTextEntry1] : Ms. Ania Mei is a 37 y/o single  female., domiciled at [facility], [facility] DM type 1, DM neuropathy, fatty liver, migraine HA, pseudoseizures, pph ARMAAN, cluster B, 6 prior [facility] admissions most recenlty 6/2023, 4/2023, 4 prior SA (drinking capeful of acetone, drinking hydrogen peroxide, taking 1/2 bottle ibuprofen, and drinking pinesol/bleach).   \par Ania most recently  reported severe severe stressors sine beginning of 2020 , including homelessness , changes in living situation, stress over the covid pandemic with losses within the family including his father on 11/28/22.  Ania reported increased of anxiety with episodes of falling, generalized shakiness, head pressure and inability to move, patients EEG and video EEG were reportedly negative for any seizure activity. \par \par On visit today, pt reports that she continues to grief the loss of her father. No SI since d/c from [facility]. Discussed coping skills for suicidal thoughts should they return. Will continue current regimen which she has been tolerating overall with decrease from lamictal 150 mg to 125 mg.  No homicidal thoughts. No overt psychosis or alvina on exam. Patient has appropriate protective factors in place. Is engaged in his treatment. No acute safety concerns. Outpatient level of care remains appropriate.\par \par \par Pt is not an imminent risk to self. She remains at a chronically elevated risks due to history of previous suicidal gestures and attempts, history of hospitalization, mood deregulation, hx of impulsivity. This however is mitigated by future talk, no current SI w/ intent or plan, identifies reasons for living, engaged in outpatient treatment, living in closely monitored facility. Furthermore, pt chronic risk will not be mitigated by inpatient hospitalization. She is not an imminent risk to self/other and outpatient level of care remains appropriate. \par  \par Safety plan discussed at lengths w/ pt/mother. Pt reports that she will call 911 or go to the nearest Emergency room should she become an imminent risk to self/other. Pt will also utilize suicide hotline number provided. \par \par current diagnostic impression: somatic symptom disorder, ARMAAN, cluster B traits r/o dependent personality disorder \par \par decrease lamictal to 125 mg  \par Escitalopram 20mg po daily\par continue individual therapy w/ Suzan \par RTC4 weeks\par

## 2023-07-27 NOTE — SOCIAL HISTORY
[FreeTextEntry1] : [facility] Res[facility] for the past 2.5yrs [TextBox_52] : Prescribed

## 2023-07-27 NOTE — ED ADULT NURSE NOTE - NS ED NOTE ABUSE RESPONSE YN
----- Message from Arely Beck MD sent at 7/27/2023  5:39 PM CDT -----  Dear Dr. Pack,   I'm following Mr. Salvador for his AAA. I ordered a CTA, which has incidentally shown a left upper lobe nodule (no prior images for comparison of the same area). Recommendation is for repeat CT chest in 3 months, which I have ordered; would you be able to follow him longitudinally for this? I am planning to see him next in about 6 months with a CT of the abd/pelvis for surveillance of the AAA.   Thank you so much,   Arely Beck    
Yes

## 2023-07-27 NOTE — SOCIAL HISTORY
[Assisted Living Facility] : lives in an assisted living facility [Disabled] : disabled [Never ] : never  [High School] : high school [None] : none [No Known Substance Use] : no known substance use [Yes] : yes [No Known Use] : no known use

## 2023-07-27 NOTE — PHYSICAL EXAM
[FreeTextEntry2] : uses a walker  [FreeTextEntry5] : dispersed red spots noted on chest and back, nontender, not confluent, no blistering  [de-identified] : Fair [de-identified] : Fair

## 2023-07-27 NOTE — HISTORY OF PRESENT ILLNESS
[FreeTextEntry1] : \par  [FreeTextEntry2] : five inpatient admissions, most recently 4/2023 for SI, had also reported SA by drinking bleach/pinsole after father's death in 11/2022; prior to this patient has history of 3 remote SA in her teens (approx. 16/18 yo, reports taking capeful acetone, drinking hydrogen peroxide, and half bottle of ibuprofen)\par multiple failed medication trials \par multiple medical ED visits for pseudoseizures and other medical complaints \par  [FreeTextEntry3] : celexa\par cymbalta (not effective)\par zyprexa\par buspar (not effective)\par gabapentin (hives)\par melatonin  [de-identified] : \par

## 2023-07-28 DIAGNOSIS — F45.0 SOMATIZATION DISORDER: ICD-10-CM

## 2023-08-02 ENCOUNTER — APPOINTMENT (OUTPATIENT)
Dept: PSYCHIATRY | Facility: CLINIC | Age: 38
End: 2023-08-02

## 2023-08-02 ENCOUNTER — OUTPATIENT (OUTPATIENT)
Dept: OUTPATIENT SERVICES | Facility: HOSPITAL | Age: 38
LOS: 1 days | End: 2023-08-02
Payer: MEDICAID

## 2023-08-02 DIAGNOSIS — F39 UNSPECIFIED MOOD [AFFECTIVE] DISORDER: ICD-10-CM

## 2023-08-02 DIAGNOSIS — F41.1 GENERALIZED ANXIETY DISORDER: ICD-10-CM

## 2023-08-02 DIAGNOSIS — F60.3 BORDERLINE PERSONALITY DISORDER: ICD-10-CM

## 2023-08-02 PROCEDURE — 90832 PSYTX W PT 30 MINUTES: CPT

## 2023-08-02 NOTE — PHYSICAL EXAM
[Cooperative] : cooperative [Euthymic] : euthymic [Full] : full [Clear] : clear [Linear/Goal Directed] : linear/goal directed [Average] : average [WNL] : within normal limits [de-identified] : Fair

## 2023-08-02 NOTE — RISK ASSESSMENT
[Yes] : Safety Plan completed/updated (for individuals at risk): Yes [No, patient denies ideation or behavior] : No, patient denies ideation or behavior [FreeTextEntry8] : Patient has recent history of suicide thoughts triggered by life conflict and loss of father. She reports worries that she will have thoughts again being around people at her residence who currently have these thoughts.  [Low acute suicide risk] : Low acute suicide risk [No] : No

## 2023-08-02 NOTE — PLAN
[Medication education provided] : Medication education provided. [Rationale for medication choices, possible risks/precautions, benefits, alternative treatment choices, and consequences of non-treatment discussed] : Rationale for medication choices, possible risks/precautions, benefits, alternative treatment choices, and consequences of non-treatment discussed with patient/family/caregiver  [FreeTextEntry2] : Goal #1 " I want to feel better, and need help managing my anxiety and stress" \par  \par  Objective #1  Patient will learn assertive communication and be able to set boundaries and limits.  \par  Objective #2  Patient will learn and utilize positive coping, bereavement, stress management, and CBT/DBT methods. \par  \par    Goal #2  " I want to improve the quality of my life" \par  Obj#1   Patient will comply with all medical and psychiatric treatment recommendations" \par  Obj#2   Patient will participate in DBT skills group, other support groups and/or Programs. \par   \par   [Chauncey Therapy] : Chauncey Therapy  [Supportive Therapy] : Supportive Therapy [de-identified] : Patient reports that, overall, she is feeling calmer, but the Lamictal is causing her to feel too tired during the day. She reports that she has been needing to go back to sleep after breakfast and has been falling asleep during the day in certain places like the bathroom. She reports meeting with her new psychiatrist, Dr. Coreas, who reduce her Lamictal to 100mg. She spoke ongoing stress related to her boyfriend who is very needing, issues with other residents at the adult home such as some who are dying of cancer and one that expresses thoughts of suicide. She reports that worries this will trigger suicide thoughts which she does not want to have again. She reports that this also worsens thoughts related to unresolved grief, and anxiety about what will happen to her when her mother also dies. Helped patient further express and process unresolved grief. Spoke about positive coping, CBT, and DBT methods. She reports that she has been trying to set limits and boundaries with others such as only spending 2-3 hours a day with boyfriend. She will make efforts to change conversations, or what residents are telling her, if they impact her negatively or walk away from them. She reports that she bought more crafts and has been spending time in the recreation room reengaging in crafts, which is therapeutic for her.  [Recommended Frequency of Visits: ____] : Recommended frequency of visits: [unfilled] [Return in ____ week(s)] : Return in [unfilled] week(s) [FreeTextEntry1] : Patient will focus on positive coping, grieving, CBT, and DBT methods. She will continue adjusted medication regimen, and follow-up with therapy on 8/11.

## 2023-08-03 DIAGNOSIS — F41.1 GENERALIZED ANXIETY DISORDER: ICD-10-CM

## 2023-08-03 DIAGNOSIS — F60.3 BORDERLINE PERSONALITY DISORDER: ICD-10-CM

## 2023-08-03 DIAGNOSIS — F39 UNSPECIFIED MOOD [AFFECTIVE] DISORDER: ICD-10-CM

## 2023-08-04 ENCOUNTER — APPOINTMENT (OUTPATIENT)
Dept: NUTRITION | Facility: CLINIC | Age: 38
End: 2023-08-04

## 2023-08-04 ENCOUNTER — OUTPATIENT (OUTPATIENT)
Dept: OUTPATIENT SERVICES | Facility: HOSPITAL | Age: 38
LOS: 1 days | End: 2023-08-04
Payer: MEDICAID

## 2023-08-04 DIAGNOSIS — E10.65 TYPE 1 DIABETES MELLITUS WITH HYPERGLYCEMIA: ICD-10-CM

## 2023-08-04 PROCEDURE — G0108: CPT

## 2023-08-08 DIAGNOSIS — E10.65 TYPE 1 DIABETES MELLITUS WITH HYPERGLYCEMIA: ICD-10-CM

## 2023-08-10 NOTE — ED PROVIDER NOTE - NS ED MD DISPO DISCHARGE
[facility] A-T Advancement Flap Text: The defect edges were debeveled with a #15 scalpel blade.  Given the location of the defect, shape of the defect and the proximity to free margins an A-T advancement flap was deemed most appropriate.  Using a sterile surgical marker, an appropriate advancement flap was drawn incorporating the defect and placing the expected incisions within the relaxed skin tension lines where possible.    The area thus outlined was incised deep to adipose tissue with a #15 scalpel blade.  The skin margins were undermined to an appropriate distance in all directions utilizing iris scissors.

## 2023-08-11 ENCOUNTER — OUTPATIENT (OUTPATIENT)
Dept: OUTPATIENT SERVICES | Facility: HOSPITAL | Age: 38
LOS: 1 days | End: 2023-08-11
Payer: MEDICAID

## 2023-08-11 ENCOUNTER — APPOINTMENT (OUTPATIENT)
Dept: PSYCHIATRY | Facility: CLINIC | Age: 38
End: 2023-08-11

## 2023-08-11 DIAGNOSIS — F60.3 BORDERLINE PERSONALITY DISORDER: ICD-10-CM

## 2023-08-11 DIAGNOSIS — F41.1 GENERALIZED ANXIETY DISORDER: ICD-10-CM

## 2023-08-11 PROCEDURE — 90832 PSYTX W PT 30 MINUTES: CPT

## 2023-08-11 NOTE — REASON FOR VISIT
[Patient] : Patient [FreeTextEntry1] : Patient is a 38-year-old female seen for a follow-up therapy session, 10:50 AM to 11:20 AM

## 2023-08-11 NOTE — PHYSICAL EXAM
[Cooperative] : cooperative [Euthymic] : euthymic [Full] : full [Clear] : clear [Linear/Goal Directed] : linear/goal directed [Average] : average [WNL] : within normal limits [de-identified] : Fair

## 2023-08-11 NOTE — PLAN
[FreeTextEntry2] : Goal #1 " I want to feel better, and need help managing my anxiety and stress" \par  \par  Objective #1  Patient will learn assertive communication and be able to set boundaries and limits.  \par  Objective #2  Patient will learn and utilize positive coping, bereavement, stress management, and CBT/DBT methods. \par  \par    Goal #2  " I want to improve the quality of my life" \par  Obj#1   Patient will comply with all medical and psychiatric treatment recommendations" \par  Obj#2   Patient will participate in DBT skills group, other support groups and/or Programs. \par   \par   [Speer Therapy] : Speer Therapy  [Supportive Therapy] : Supportive Therapy [de-identified] : Patient reports that, besides her sadness related to loss of her father and taking Lamictal, her mood and anxiety are fairly stable. She, however, reports that she is still feeling drowsy in the morning and during the day despite the Lamictal being decreased from 125mg to 100mg. She will address further with her psychiatrist, Dr. Coreas, on 9/24. She reports efforts to not go back to sleep in the morning. Helped patient express feelings related to loss of father, especially since it would have been his birthday.  Reinforced grieving and coping methods such as sending a helium heart shaped balloon in the lissette. She reports that seeing hearts is comforting sign since her father liked hearts. She expressed frustration and preoccupation related to hearing suicide thoughts from a woman at her residence. She worries that hearing this will trigger her to have suicide thoughts. She denies any suicide thoughts. Redirected patient to set limits, redirect conversation, involve staff to address other's suicide thoughts, and ask for her seat at the dining room to be changed if needed. Spoke about other coping, CBT, and DBT methods such as "TIPP, Wise Mind, and STOP methods. She reports splashing cold water on her face or taking hot showers helps her relax or relive stress. She has been making efforts to think about situations, options, and plan of action before reacting on her emotions.  Patient also reports engaging in arts and crafts. She continues to have problems with her diet and blood sugar level is high. She reports that she has been seeing a Nutritionist as needed, keeping a daily food journal. She reports that if her diet does not change, she will need a higher level of insulin. She will be following up with her Podiatrist and Pulmonologist to check nodule in her lung. She reports that she still feels burning in her legs, although she has not complained about it. She reports using prescribed medication or cream.  [Recommended Frequency of Visits: ____] : Recommended frequency of visits: [unfilled] [Return in ____ week(s)] : Return in [unfilled] week(s) [FreeTextEntry1] : Patient will focus on positive coping, stress management, grieving, and CBT/DBT methods. She will continue medication regimen, follow-up with medical appointment, follow-up with psychiatrist, and follow-up with therapy in two weeks due to her upcoming medical appointments.

## 2023-08-12 DIAGNOSIS — F41.1 GENERALIZED ANXIETY DISORDER: ICD-10-CM

## 2023-08-12 DIAGNOSIS — F60.3 BORDERLINE PERSONALITY DISORDER: ICD-10-CM

## 2023-08-14 ENCOUNTER — APPOINTMENT (OUTPATIENT)
Dept: PODIATRY | Facility: CLINIC | Age: 38
End: 2023-08-14
Payer: MEDICAID

## 2023-08-14 ENCOUNTER — OUTPATIENT (OUTPATIENT)
Dept: OUTPATIENT SERVICES | Facility: HOSPITAL | Age: 38
LOS: 1 days | End: 2023-08-14
Payer: MEDICAID

## 2023-08-14 DIAGNOSIS — E66.9 OBESITY, UNSPECIFIED: ICD-10-CM

## 2023-08-14 DIAGNOSIS — L84 CORNS AND CALLOSITIES: ICD-10-CM

## 2023-08-14 DIAGNOSIS — L60.3 NAIL DYSTROPHY: ICD-10-CM

## 2023-08-14 DIAGNOSIS — Z00.00 ENCOUNTER FOR GENERAL ADULT MEDICAL EXAMINATION WITHOUT ABNORMAL FINDINGS: ICD-10-CM

## 2023-08-14 PROCEDURE — 99213 OFFICE O/P EST LOW 20 MIN: CPT

## 2023-08-14 NOTE — PHYSICAL EXAM
[Ankle Swelling Bilaterally] : bilaterally  [Foot Ulcer] : no foot ulcer [FreeTextEntry1] : No abnormality [Diminished Throughout Right Foot] : normal sensation with monofilament testing throughout right foot [Diminished Throughout Left Foot] : normal sensation with monofilament testing throughout left foot [General Appearance - Alert] : alert [General Appearance - In No Acute Distress] : in no acute distress [Abnormal Walk] : normal gait [Nail Clubbing] : no clubbing  or cyanosis of the fingernails [Involuntary Movements] : no involuntary movements were seen [Motor Tone] : muscle strength and tone were normal [Skin Color & Pigmentation] : normal skin color and pigmentation [] : no rash [Right Foot Was Examined] : The right foot was examined [Left Foot Was Examined] : The left foot was examined [Normal Appearance] : normal in appearance [Delayed in the Right Toes] : normal in the toes [Normal in Appearance] : normal in appearance [Full ROM] : with limited range of motion [Tenderness] : tender [Delayed in the Left Toes] : normal in the toes [1+] : 1+ in the dorsalis pedis [Deep Tendon Reflexes (DTR)] : deep tendon reflexes were 2+ and symmetric [Sensation] : the sensory exam was normal to light touch and pinprick [No Focal Deficits] : no focal deficits [Oriented To Time, Place, And Person] : oriented to person, place, and time [Impaired Insight] : insight and judgment were intact [Affect] : the affect was normal

## 2023-08-14 NOTE — HISTORY OF PRESENT ILLNESS
[Sneakers] : reema [Walker] : a walker [FreeTextEntry1] : CRISTI MONTGOMERY  returns to clinic for a follow-up visit for a routine diabetic foot examination.  Patient denies NVFC and denies SOB. Pt. denies any other pedal complaints at this time. Patient trims her own nails and callus with ped egg  Most recent A1c: 9.6 (12/22) Most recent BS: 270 (this morning)

## 2023-08-23 ENCOUNTER — APPOINTMENT (OUTPATIENT)
Dept: GASTROENTEROLOGY | Facility: CLINIC | Age: 38
End: 2023-08-23

## 2023-08-23 ENCOUNTER — EMERGENCY (EMERGENCY)
Facility: HOSPITAL | Age: 38
LOS: 0 days | Discharge: HOME | End: 2023-08-24
Attending: STUDENT IN AN ORGANIZED HEALTH CARE EDUCATION/TRAINING PROGRAM
Payer: MEDICAID

## 2023-08-23 VITALS
TEMPERATURE: 99 F | RESPIRATION RATE: 20 BRPM | OXYGEN SATURATION: 97 % | HEART RATE: 102 BPM | SYSTOLIC BLOOD PRESSURE: 128 MMHG | HEIGHT: 64 IN | WEIGHT: 190.04 LBS | DIASTOLIC BLOOD PRESSURE: 64 MMHG

## 2023-08-23 DIAGNOSIS — R19.7 DIARRHEA, UNSPECIFIED: ICD-10-CM

## 2023-08-23 DIAGNOSIS — K21.9 GASTRO-ESOPHAGEAL REFLUX DISEASE WITHOUT ESOPHAGITIS: ICD-10-CM

## 2023-08-23 DIAGNOSIS — Z88.7 ALLERGY STATUS TO SERUM AND VACCINE: ICD-10-CM

## 2023-08-23 DIAGNOSIS — K62.89 OTHER SPECIFIED DISEASES OF ANUS AND RECTUM: ICD-10-CM

## 2023-08-23 DIAGNOSIS — Z88.1 ALLERGY STATUS TO OTHER ANTIBIOTIC AGENTS STATUS: ICD-10-CM

## 2023-08-23 DIAGNOSIS — Z88.8 ALLERGY STATUS TO OTHER DRUGS, MEDICAMENTS AND BIOLOGICAL SUBSTANCES: ICD-10-CM

## 2023-08-23 DIAGNOSIS — F41.9 ANXIETY DISORDER, UNSPECIFIED: ICD-10-CM

## 2023-08-23 DIAGNOSIS — Z88.0 ALLERGY STATUS TO PENICILLIN: ICD-10-CM

## 2023-08-23 DIAGNOSIS — E11.9 TYPE 2 DIABETES MELLITUS WITHOUT COMPLICATIONS: ICD-10-CM

## 2023-08-23 DIAGNOSIS — R10.9 UNSPECIFIED ABDOMINAL PAIN: ICD-10-CM

## 2023-08-23 DIAGNOSIS — F32.A DEPRESSION, UNSPECIFIED: ICD-10-CM

## 2023-08-23 PROBLEM — L60.3 BRITTLE NAILS: Status: ACTIVE | Noted: 2017-10-16

## 2023-08-23 PROBLEM — E66.9 OBESITY (BMI 30-39.9): Status: ACTIVE | Noted: 2021-12-09

## 2023-08-23 PROBLEM — L84 CALLUS OF FOOT: Status: ACTIVE | Noted: 2017-10-16

## 2023-08-23 PROCEDURE — 99285 EMERGENCY DEPT VISIT HI MDM: CPT

## 2023-08-23 PROCEDURE — 96375 TX/PRO/DX INJ NEW DRUG ADDON: CPT

## 2023-08-23 PROCEDURE — 84703 CHORIONIC GONADOTROPIN ASSAY: CPT

## 2023-08-23 PROCEDURE — 36415 COLL VENOUS BLD VENIPUNCTURE: CPT

## 2023-08-23 PROCEDURE — 85025 COMPLETE CBC W/AUTO DIFF WBC: CPT

## 2023-08-23 PROCEDURE — 96374 THER/PROPH/DIAG INJ IV PUSH: CPT | Mod: XU

## 2023-08-23 PROCEDURE — 74177 CT ABD & PELVIS W/CONTRAST: CPT | Mod: MA

## 2023-08-23 PROCEDURE — 87086 URINE CULTURE/COLONY COUNT: CPT

## 2023-08-23 PROCEDURE — 81003 URINALYSIS AUTO W/O SCOPE: CPT

## 2023-08-23 PROCEDURE — 80053 COMPREHEN METABOLIC PANEL: CPT

## 2023-08-23 PROCEDURE — 83690 ASSAY OF LIPASE: CPT

## 2023-08-23 PROCEDURE — 99284 EMERGENCY DEPT VISIT MOD MDM: CPT | Mod: 25

## 2023-08-23 RX ORDER — FAMOTIDINE 10 MG/ML
20 INJECTION INTRAVENOUS ONCE
Refills: 0 | Status: COMPLETED | OUTPATIENT
Start: 2023-08-23 | End: 2023-08-23

## 2023-08-23 RX ORDER — SODIUM CHLORIDE 9 MG/ML
2700 INJECTION, SOLUTION INTRAVENOUS ONCE
Refills: 0 | Status: DISCONTINUED | OUTPATIENT
Start: 2023-08-23 | End: 2023-08-23

## 2023-08-23 RX ORDER — ACETAMINOPHEN 500 MG
650 TABLET ORAL ONCE
Refills: 0 | Status: COMPLETED | OUTPATIENT
Start: 2023-08-23 | End: 2023-08-23

## 2023-08-23 RX ORDER — SODIUM CHLORIDE 9 MG/ML
1000 INJECTION, SOLUTION INTRAVENOUS ONCE
Refills: 0 | Status: COMPLETED | OUTPATIENT
Start: 2023-08-23 | End: 2023-08-23

## 2023-08-23 RX ORDER — MORPHINE SULFATE 50 MG/1
4 CAPSULE, EXTENDED RELEASE ORAL ONCE
Refills: 0 | Status: DISCONTINUED | OUTPATIENT
Start: 2023-08-23 | End: 2023-08-23

## 2023-08-23 RX ORDER — OMEPRAZOLE 20 MG/1
20 TABLET, DELAYED RELEASE ORAL
Qty: 30 | Refills: 5 | Status: COMPLETED | COMMUNITY
Start: 2022-10-13 | End: 2023-08-23

## 2023-08-23 NOTE — ED PROVIDER NOTE - PATIENT PORTAL LINK FT
You can access the FollowMyHealth Patient Portal offered by [facility] by registering at the following website: [email or web address]. By joining [employer]’s FollowMyHealth portal, you will also be able to view your health information using other applications (apps) compatible with our system.

## 2023-08-23 NOTE — ASSESSMENT
[FreeTextEntry1] : -Patient seen and evaluated in clinic today with all questions and concerns addressed and answered. -Discussed with patient about importance of tight glycemic control, good pedal hygiene and daily pedal checks.  -Patient to return to clinic in 3 months.

## 2023-08-23 NOTE — ED PROVIDER NOTE - CLINICAL SUMMARY MEDICAL DECISION MAKING FREE TEXT BOX
Labs were ordered and reviewed.  Imaging was ordered and reviewed by me.  Appropriate medications for patient's presenting complaints were ordered and effects were reassessed.  Additional history was obtained from mother at bedside.  Patient feels much better and is comfortable with discharge.  She has established follow up with endocrinology and gastroenterology for pending colonoscopy. Given strict return precautions. Arranged for transport back to [facility].

## 2023-08-23 NOTE — ED PROVIDER NOTE - PHYSICAL EXAMINATION
VITAL SIGNS: I have reviewed nursing notes and confirm.  CONSTITUTIONAL: Well-appearing, non-toxic, in NAD  SKIN: Warm dry, normal skin turgor  HEAD: NCAT  EYES: No conjunctival injection, scleral anicteric  ENT: MMM  NECK: Supple; FROM.   CARD: RRR  RESP: CTAB  ABD: Soft, NDNT with distraction.   EXT: No pedal edema, no calf tenderness  NEURO: Grossly normal examination, CN grossly intact  PSYCH: Cooperative, appropriate VITAL SIGNS: I have reviewed nursing notes and confirm.  CONSTITUTIONAL: Well-appearing, non-toxic, in NAD  SKIN: Warm dry, normal skin turgor  HEAD: NCAT  EYES: No conjunctival injection, scleral anicteric  ENT: MMM  NECK: Supple; FROM.   CARD: RRR  RESP: CTAB  ABD: Soft, NDNT with distraction.   EXT: No pedal edema, no calf tenderness  : no hemorrhoids or blood in rectum on BIN, mild skin irritation loretta-anal, no fissures  NEURO: Grossly normal examination, CN grossly intact  PSYCH: Cooperative, appropriate

## 2023-08-23 NOTE — ED PROVIDER NOTE - CARE PLAN
Principal Discharge DX:	Abdominal pain   1 Principal Discharge DX:	Abdominal pain  Secondary Diagnosis:	Diarrhea

## 2023-08-23 NOTE — ED PROVIDER NOTE - NSFOLLOWUPINSTRUCTIONS_ED_ALL_ED_FT
Attend your upcoming GI appointment for your colonoscopy for further evaluation.    Abdominal Pain    Many things can cause abdominal pain. Many times, abdominal pain is not caused by a disease and will improve without treatment. Your health care provider will do a physical exam to determine if there is a dangerous cause of your pain; blood tests and imaging may help determine the cause of your pain. However, in many cases, no cause may be found and you may need further testing as an outpatient. Monitor your abdominal pain for any changes.     SEEK IMMEDIATE MEDICAL CARE IF YOU HAVE ANY OF THE FOLLOWING SYMPTOMS: worsening abdominal pain, uncontrollable vomiting, profuse diarrhea, inability to have bowel movements or pass gas, black or bloody stools, fever accompanying chest pain or back pain, or fainting. These symptoms may represent a serious problem that is an emergency. Do not wait to see if the symptoms will go away. Get medical help right away. Call 911 and do not drive yourself to the hospital.

## 2023-08-23 NOTE — ED PROVIDER NOTE - ATTENDING CONTRIBUTION TO CARE
38 year old female, PMHx depression, anxiety, diabetes, GERD, presenting for abdominal cramping and watery diarrhea x2 days, no alleviating or aggravating factors. Patient took Pepto Bismol this morning and noticed in the evening she had darker stools. She has seen minimal blood with wiping and complains of rectal pain. Denies fever, recent travel, vomiting, urinary symptoms, including dysuria, frequency, urgency, hematuria.      CONSTITUTIONAL: obese, in no acute distress.   SKIN: warm, dry  HEAD: Normocephalic  ENT: No nasal discharge; airway clear.  NECK: Supple; non tender.  CARD: S1, S2 normal. Regular rate and rhythm.   RESP: No wheezes, rales or rhonchi.  ABD: soft nd, mild left sided tenderness to palpation  EXT: Normal ROM.  No clubbing, cyanosis or edema.   NEURO: Alert, oriented, grossly unremarkable  PSYCH: Cooperative, appropriate.

## 2023-08-23 NOTE — ED ADULT TRIAGE NOTE - NS ED NURSE AMBULANCES
[facility] PRINCIPAL PROCEDURE  Procedure: Insertion, ICD, transvenous  Findings and Treatment: [employer] subcutaneous ICD placed.

## 2023-08-23 NOTE — ED PROVIDER NOTE - PROGRESS NOTE DETAILS
TJY: pt re -assessed, she is sleeping comfortably in NAD. TJY: pt re -assessed, she is sleeping comfortably in NAD.    Pt earlier with episode of diarrhea, non bloody.

## 2023-08-23 NOTE — ED ADULT TRIAGE NOTE - CHIEF COMPLAINT QUOTE
Pt BIBA from [facility] for diarrhea/fatigue/shakiness and rectal pain x2 days  Pt endorses temp 100.2f this afternoon

## 2023-08-23 NOTE — ED PROVIDER NOTE - OBJECTIVE STATEMENT
38 year old female, PMHx depression, anxiety, diabetes, GERD, presenting for abdominal cramping and watery diarrhea x2 days, no alleviating or aggravating factors. Patient took Pepto Bismol this morning and noticed in the evening she had darker stools. She has seen minimal blood with wiping and complains of rectal pain. Denies fever, recent travel, vomiting, urinary symptoms, including dysuria, frequency, urgency, hematuria.

## 2023-08-24 ENCOUNTER — APPOINTMENT (OUTPATIENT)
Dept: PSYCHIATRY | Facility: CLINIC | Age: 38
End: 2023-08-24

## 2023-08-24 VITALS
HEART RATE: 107 BPM | OXYGEN SATURATION: 99 % | SYSTOLIC BLOOD PRESSURE: 137 MMHG | RESPIRATION RATE: 18 BRPM | DIASTOLIC BLOOD PRESSURE: 84 MMHG | TEMPERATURE: 98 F

## 2023-08-24 DIAGNOSIS — L60.3 NAIL DYSTROPHY: ICD-10-CM

## 2023-08-24 DIAGNOSIS — G62.9 POLYNEUROPATHY, UNSPECIFIED: ICD-10-CM

## 2023-08-24 DIAGNOSIS — L85.3 XEROSIS CUTIS: ICD-10-CM

## 2023-08-24 DIAGNOSIS — E66.9 OBESITY, UNSPECIFIED: ICD-10-CM

## 2023-08-24 DIAGNOSIS — E11.8 TYPE 2 DIABETES MELLITUS WITH UNSPECIFIED COMPLICATIONS: ICD-10-CM

## 2023-08-24 DIAGNOSIS — B35.1 TINEA UNGUIUM: ICD-10-CM
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
## 2023-08-24 DIAGNOSIS — L84 CORNS AND CALLOSITIES: ICD-10-CM
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
## 2023-08-24 LAB
ALBUMIN SERPL ELPH-MCNC: 4 G/DL — SIGNIFICANT CHANGE UP (ref 3.5–5.2)
ALP SERPL-CCNC: 147 U/L — HIGH (ref 30–115)
ALT FLD-CCNC: 43 U/L — HIGH (ref 0–41)
ANION GAP SERPL CALC-SCNC: 9 MMOL/L — SIGNIFICANT CHANGE UP (ref 7–14)
APPEARANCE UR: CLEAR — SIGNIFICANT CHANGE UP
AST SERPL-CCNC: 21 U/L — SIGNIFICANT CHANGE UP (ref 0–41)
BASOPHILS # BLD AUTO: 0.03 K/UL — SIGNIFICANT CHANGE UP (ref 0–0.2)
BASOPHILS NFR BLD AUTO: 0.3 % — SIGNIFICANT CHANGE UP (ref 0–1)
BILIRUB SERPL-MCNC: <0.2 MG/DL — SIGNIFICANT CHANGE UP (ref 0.2–1.2)
BILIRUB UR-MCNC: NEGATIVE — SIGNIFICANT CHANGE UP
BUN SERPL-MCNC: 10 MG/DL — SIGNIFICANT CHANGE UP (ref 10–20)
CALCIUM SERPL-MCNC: 8.9 MG/DL — SIGNIFICANT CHANGE UP (ref 8.4–10.5)
CHLORIDE SERPL-SCNC: 97 MMOL/L — LOW (ref 98–110)
CO2 SERPL-SCNC: 26 MMOL/L — SIGNIFICANT CHANGE UP (ref 17–32)
COLOR SPEC: YELLOW — SIGNIFICANT CHANGE UP
CREAT SERPL-MCNC: 0.7 MG/DL — SIGNIFICANT CHANGE UP (ref 0.7–1.5)
DIFF PNL FLD: NEGATIVE — SIGNIFICANT CHANGE UP
EGFR: 113 ML/MIN/1.73M2 — SIGNIFICANT CHANGE UP
EOSINOPHIL # BLD AUTO: 0.02 K/UL — SIGNIFICANT CHANGE UP (ref 0–0.7)
EOSINOPHIL NFR BLD AUTO: 0.2 % — SIGNIFICANT CHANGE UP (ref 0–8)
GLUCOSE SERPL-MCNC: 162 MG/DL — HIGH (ref 70–99)
GLUCOSE UR QL: NEGATIVE MG/DL — SIGNIFICANT CHANGE UP
HCG SERPL QL: NEGATIVE — SIGNIFICANT CHANGE UP
HCT VFR BLD CALC: 44.1 % — SIGNIFICANT CHANGE UP (ref 37–47)
HGB BLD-MCNC: 14.6 G/DL — SIGNIFICANT CHANGE UP (ref 12–16)
IMM GRANULOCYTES NFR BLD AUTO: 0.9 % — HIGH (ref 0.1–0.3)
KETONES UR-MCNC: NEGATIVE MG/DL — SIGNIFICANT CHANGE UP
LEUKOCYTE ESTERASE UR-ACNC: NEGATIVE — SIGNIFICANT CHANGE UP
LIDOCAIN IGE QN: 11 U/L — SIGNIFICANT CHANGE UP (ref 7–60)
LYMPHOCYTES # BLD AUTO: 1.08 K/UL — LOW (ref 1.2–3.4)
LYMPHOCYTES # BLD AUTO: 12.5 % — LOW (ref 20.5–51.1)
MCHC RBC-ENTMCNC: 30 PG — SIGNIFICANT CHANGE UP (ref 27–31)
MCHC RBC-ENTMCNC: 33.1 G/DL — SIGNIFICANT CHANGE UP (ref 32–37)
MCV RBC AUTO: 90.6 FL — SIGNIFICANT CHANGE UP (ref 81–99)
MONOCYTES # BLD AUTO: 0.6 K/UL — SIGNIFICANT CHANGE UP (ref 0.1–0.6)
MONOCYTES NFR BLD AUTO: 6.9 % — SIGNIFICANT CHANGE UP (ref 1.7–9.3)
NEUTROPHILS # BLD AUTO: 6.84 K/UL — HIGH (ref 1.4–6.5)
NEUTROPHILS NFR BLD AUTO: 79.2 % — HIGH (ref 42.2–75.2)
NITRITE UR-MCNC: NEGATIVE — SIGNIFICANT CHANGE UP
NRBC # BLD: 0 /100 WBCS — SIGNIFICANT CHANGE UP (ref 0–0)
PH UR: 6.5 — SIGNIFICANT CHANGE UP (ref 5–8)
PLATELET # BLD AUTO: 343 K/UL — SIGNIFICANT CHANGE UP (ref 130–400)
PMV BLD: 10.1 FL — SIGNIFICANT CHANGE UP (ref 7.4–10.4)
POTASSIUM SERPL-MCNC: 4.6 MMOL/L — SIGNIFICANT CHANGE UP (ref 3.5–5)
POTASSIUM SERPL-SCNC: 4.6 MMOL/L — SIGNIFICANT CHANGE UP (ref 3.5–5)
PROT SERPL-MCNC: 6.6 G/DL — SIGNIFICANT CHANGE UP (ref 6–8)
PROT UR-MCNC: NEGATIVE MG/DL — SIGNIFICANT CHANGE UP
RBC # BLD: 4.87 M/UL — SIGNIFICANT CHANGE UP (ref 4.2–5.4)
RBC # FLD: 13.9 % — SIGNIFICANT CHANGE UP (ref 11.5–14.5)
SODIUM SERPL-SCNC: 132 MMOL/L — LOW (ref 135–146)
SP GR SPEC: 1.02 — SIGNIFICANT CHANGE UP (ref 1–1.03)
UROBILINOGEN FLD QL: 0.2 MG/DL — SIGNIFICANT CHANGE UP (ref 0.2–1)
WBC # BLD: 8.65 K/UL — SIGNIFICANT CHANGE UP (ref 4.8–10.8)
WBC # FLD AUTO: 8.65 K/UL — SIGNIFICANT CHANGE UP (ref 4.8–10.8)

## 2023-08-24 PROCEDURE — 74177 CT ABD & PELVIS W/CONTRAST: CPT | Mod: 26,MA

## 2023-08-24 RX ORDER — PANTOPRAZOLE SODIUM 20 MG/1
40 TABLET, DELAYED RELEASE ORAL
Refills: 0 | Status: DISCONTINUED | OUTPATIENT
Start: 2023-08-24 | End: 2023-08-24

## 2023-08-24 RX ORDER — METOPROLOL TARTRATE 50 MG
25 TABLET ORAL ONCE
Refills: 0 | Status: COMPLETED | OUTPATIENT
Start: 2023-08-24 | End: 2023-08-24

## 2023-08-24 RX ORDER — SODIUM CHLORIDE 9 MG/ML
1000 INJECTION, SOLUTION INTRAVENOUS ONCE
Refills: 0 | Status: COMPLETED | OUTPATIENT
Start: 2023-08-24 | End: 2023-08-24

## 2023-08-24 RX ORDER — ESCITALOPRAM OXALATE 10 MG/1
20 TABLET, FILM COATED ORAL ONCE
Refills: 0 | Status: COMPLETED | OUTPATIENT
Start: 2023-08-24 | End: 2023-08-24

## 2023-08-24 RX ADMIN — FAMOTIDINE 100 MILLIGRAM(S): 10 INJECTION INTRAVENOUS at 00:11

## 2023-08-24 RX ADMIN — SODIUM CHLORIDE 1000 MILLILITER(S): 9 INJECTION, SOLUTION INTRAVENOUS at 06:57

## 2023-08-24 RX ADMIN — Medication 25 MILLIGRAM(S): at 08:23

## 2023-08-24 RX ADMIN — ESCITALOPRAM OXALATE 20 MILLIGRAM(S): 10 TABLET, FILM COATED ORAL at 08:45

## 2023-08-24 RX ADMIN — Medication 650 MILLIGRAM(S): at 00:12

## 2023-08-24 RX ADMIN — PANTOPRAZOLE SODIUM 40 MILLIGRAM(S): 20 TABLET, DELAYED RELEASE ORAL at 08:22

## 2023-08-24 NOTE — ED ADULT NURSE NOTE - HIV OFFER
Pt presents to ED with c/o chest pain, SOB, cough and vomiting. Pt states he has had intermittent CP for the past month and worsening SOB for the past few days. Pt has increased respiratory effort. Wheezing bilaterally. Diaphoretic. Previously Declined (within the last year)

## 2023-08-24 NOTE — ED ADULT NURSE NOTE - CHIEF COMPLAINT QUOTE
Pt BIBA from [facility] for diarrhea/fatigue/shakiness and rectal pain x2 days  Pt endorses temp 100.2f this afternoon

## 2023-08-25 ENCOUNTER — APPOINTMENT (OUTPATIENT)
Dept: PSYCHIATRY | Facility: CLINIC | Age: 38
End: 2023-08-25

## 2023-08-25 ENCOUNTER — APPOINTMENT (OUTPATIENT)
Dept: NUTRITION | Facility: CLINIC | Age: 38
End: 2023-08-25

## 2023-08-25 LAB
CULTURE RESULTS: NO GROWTH — SIGNIFICANT CHANGE UP
SPECIMEN SOURCE: SIGNIFICANT CHANGE UP

## 2023-08-27 ENCOUNTER — EMERGENCY (EMERGENCY)
Facility: HOSPITAL | Age: 38
LOS: 0 days | Discharge: HOME | End: 2023-08-28
Attending: STUDENT IN AN ORGANIZED HEALTH CARE EDUCATION/TRAINING PROGRAM
Payer: MEDICAID

## 2023-08-27 VITALS
DIASTOLIC BLOOD PRESSURE: 85 MMHG | OXYGEN SATURATION: 98 % | SYSTOLIC BLOOD PRESSURE: 139 MMHG | HEIGHT: 64 IN | RESPIRATION RATE: 18 BRPM | TEMPERATURE: 98 F | HEART RATE: 98 BPM

## 2023-08-27 DIAGNOSIS — Z88.1 ALLERGY STATUS TO OTHER ANTIBIOTIC AGENTS STATUS: ICD-10-CM

## 2023-08-27 DIAGNOSIS — H60.92 UNSPECIFIED OTITIS EXTERNA, LEFT EAR: ICD-10-CM

## 2023-08-27 DIAGNOSIS — E10.40 TYPE 1 DIABETES MELLITUS WITH DIABETIC NEUROPATHY, UNSPECIFIED: ICD-10-CM

## 2023-08-27 DIAGNOSIS — Z88.0 ALLERGY STATUS TO PENICILLIN: ICD-10-CM

## 2023-08-27 DIAGNOSIS — Z88.8 ALLERGY STATUS TO OTHER DRUGS, MEDICAMENTS AND BIOLOGICAL SUBSTANCES: ICD-10-CM

## 2023-08-27 DIAGNOSIS — Z87.440 PERSONAL HISTORY OF URINARY (TRACT) INFECTIONS: ICD-10-CM

## 2023-08-27 DIAGNOSIS — K21.9 GASTRO-ESOPHAGEAL REFLUX DISEASE WITHOUT ESOPHAGITIS: ICD-10-CM

## 2023-08-27 DIAGNOSIS — M48.00 SPINAL STENOSIS, SITE UNSPECIFIED: ICD-10-CM

## 2023-08-27 DIAGNOSIS — F32.9 MAJOR DEPRESSIVE DISORDER, SINGLE EPISODE, UNSPECIFIED: ICD-10-CM

## 2023-08-27 DIAGNOSIS — Z88.7 ALLERGY STATUS TO SERUM AND VACCINE: ICD-10-CM

## 2023-08-27 DIAGNOSIS — H92.02 OTALGIA, LEFT EAR: ICD-10-CM

## 2023-08-27 PROCEDURE — 99283 EMERGENCY DEPT VISIT LOW MDM: CPT

## 2023-08-27 PROCEDURE — 99284 EMERGENCY DEPT VISIT MOD MDM: CPT

## 2023-08-27 RX ORDER — OFLOXACIN OTIC SOLUTION 3 MG/ML
10 SOLUTION/ DROPS AURICULAR (OTIC) ONCE
Refills: 0 | Status: COMPLETED | OUTPATIENT
Start: 2023-08-27 | End: 2023-08-27

## 2023-08-27 NOTE — ED PROVIDER NOTE - PATIENT PORTAL LINK FT
You can access the FollowMyHealth Patient Portal offered by [facility] by registering at the following website: [email or web address]. By joining [employer]’s FollowMyHealth portal, you will also be able to view your health information using other applications (apps) compatible with our system.

## 2023-08-27 NOTE — ED PROVIDER NOTE - OBJECTIVE STATEMENT
68-year-old female with past medical history as below presenting for evaluation of chest pain that started 38-year-old female presenting for evaluation of left ear pain over the last 2 to 3 days.  No hearing loss.  No drainage.  No fevers or chills.

## 2023-08-27 NOTE — ED PROVIDER NOTE - CLINICAL SUMMARY MEDICAL DECISION MAKING FREE TEXT BOX
39 yo f with GERD, depression and DM   BIBEMS from [facility] w/ c/o left ear infection x2-3 days. denies any fever chills, sore throat, n/v. pt had redness and tenderness of the left ear canal no signs for otitis external  will send pt with otic cipro and close follow up

## 2023-08-27 NOTE — ED ADULT NURSE NOTE - OBJECTIVE STATEMENT
38y female c/o left ear pain, pt report pain coming from inside the ear with nausea, ear appear red on arrival.

## 2023-08-27 NOTE — ED PROVIDER NOTE - PHYSICAL EXAMINATION
VITAL SIGNS: I have reviewed nursing notes and confirm.  CONSTITUTIONAL: Patient is in no acute distress.  SKIN: Skin exam is warm and dry, no acute rash.  HEAD: Normocephalic; atraumatic.  EYES: PERRL, EOM intact; conjunctiva and sclera clear.  ENT: No nasal discharge; airway clear. TM normal bilaterally. +Erythema to left ear canal. No mastoid tenderness.   NECK: Supple; non tender.  CARD: S1, S2 normal; no murmurs, gallops, or rubs. Regular rate and rhythm.  RESP: Clear to auscultation bilaterally. No wheezes, rales or rhonchi.  ABD: Normal bowel sounds; soft; non-distended; non-tender.   EXT: Normal ROM. No edema.  LYMPH: No acute cervical adenopathy.  NEURO: Alert, oriented. Grossly unremarkable. No focal deficits.  PSYCH: Cooperative, appropriate.

## 2023-08-27 NOTE — ED ADULT TRIAGE NOTE - CHIEF COMPLAINT QUOTE
PT presents to the ED BIBEMS from [facility] w/ c/o left ear infection x2-3 days. Pt states that it feels like its clogged

## 2023-08-27 NOTE — ED PROVIDER NOTE - ATTENDING APP SHARED VISIT CONTRIBUTION OF CARE
I have personally performed a history and physical exam on this patient and personally directed the management of the patient.  37 yo f with GERD, depression and DM   BIBEMS from [facility] w/ c/o left ear infection x2-3 days. denies any fever chills, sore throat, n/v.  CON: appears stated age, pleasant, no acute distress, HENMT: normocephalic, atraumatic, anicteric, no conjunctival injection, redness and tenderness of left external ear canal,  CV: regular rhythm, distal pulses intact, RESP: no acute respiratory distress, no stridor, breathing comfortably on RA , GI:  soft, nontender, no rebound, no guarding, SKIN: no wounds MSK: no deformities, NEURO: no gross motor or sensory deficit Psychiatric: appropriate mood, appropriate affect   will discharge with cipro ear drop no signs for malignant otitis

## 2023-08-28 ENCOUNTER — NON-APPOINTMENT (OUTPATIENT)
Age: 38
End: 2023-08-28

## 2023-08-28 ENCOUNTER — EMERGENCY (EMERGENCY)
Facility: HOSPITAL | Age: 38
LOS: 0 days | Discharge: HOME | End: 2023-08-29
Attending: EMERGENCY MEDICINE
Payer: MEDICAID

## 2023-08-28 VITALS
WEIGHT: 190.04 LBS | SYSTOLIC BLOOD PRESSURE: 142 MMHG | TEMPERATURE: 98 F | RESPIRATION RATE: 18 BRPM | HEART RATE: 107 BPM | OXYGEN SATURATION: 100 % | HEIGHT: 64 IN | DIASTOLIC BLOOD PRESSURE: 80 MMHG

## 2023-08-28 DIAGNOSIS — Z88.1 ALLERGY STATUS TO OTHER ANTIBIOTIC AGENTS STATUS: ICD-10-CM

## 2023-08-28 DIAGNOSIS — R10.13 EPIGASTRIC PAIN: ICD-10-CM

## 2023-08-28 DIAGNOSIS — R11.0 NAUSEA: ICD-10-CM

## 2023-08-28 DIAGNOSIS — R19.7 DIARRHEA, UNSPECIFIED: ICD-10-CM

## 2023-08-28 DIAGNOSIS — E11.9 TYPE 2 DIABETES MELLITUS WITHOUT COMPLICATIONS: ICD-10-CM

## 2023-08-28 DIAGNOSIS — Z88.0 ALLERGY STATUS TO PENICILLIN: ICD-10-CM

## 2023-08-28 DIAGNOSIS — K21.9 GASTRO-ESOPHAGEAL REFLUX DISEASE WITHOUT ESOPHAGITIS: ICD-10-CM

## 2023-08-28 DIAGNOSIS — Z88.8 ALLERGY STATUS TO OTHER DRUGS, MEDICAMENTS AND BIOLOGICAL SUBSTANCES: ICD-10-CM

## 2023-08-28 DIAGNOSIS — F32.A DEPRESSION, UNSPECIFIED: ICD-10-CM

## 2023-08-28 DIAGNOSIS — Z88.7 ALLERGY STATUS TO SERUM AND VACCINE: ICD-10-CM

## 2023-08-28 DIAGNOSIS — K52.9 NONINFECTIVE GASTROENTERITIS AND COLITIS, UNSPECIFIED: ICD-10-CM

## 2023-08-28 DIAGNOSIS — R00.0 TACHYCARDIA, UNSPECIFIED: ICD-10-CM

## 2023-08-28 LAB
ALBUMIN SERPL ELPH-MCNC: 4.2 G/DL — SIGNIFICANT CHANGE UP (ref 3.5–5.2)
ALP SERPL-CCNC: 152 U/L — HIGH (ref 30–115)
ALT FLD-CCNC: 50 U/L — HIGH (ref 0–41)
ANION GAP SERPL CALC-SCNC: 13 MMOL/L — SIGNIFICANT CHANGE UP (ref 7–14)
APPEARANCE UR: CLEAR — SIGNIFICANT CHANGE UP
AST SERPL-CCNC: 18 U/L — SIGNIFICANT CHANGE UP (ref 0–41)
BASOPHILS # BLD AUTO: 0.07 K/UL — SIGNIFICANT CHANGE UP (ref 0–0.2)
BASOPHILS NFR BLD AUTO: 0.6 % — SIGNIFICANT CHANGE UP (ref 0–1)
BILIRUB SERPL-MCNC: <0.2 MG/DL — SIGNIFICANT CHANGE UP (ref 0.2–1.2)
BILIRUB UR-MCNC: NEGATIVE — SIGNIFICANT CHANGE UP
BUN SERPL-MCNC: 7 MG/DL — LOW (ref 10–20)
CALCIUM SERPL-MCNC: 9.4 MG/DL — SIGNIFICANT CHANGE UP (ref 8.4–10.5)
CHLORIDE SERPL-SCNC: 96 MMOL/L — LOW (ref 98–110)
CO2 SERPL-SCNC: 27 MMOL/L — SIGNIFICANT CHANGE UP (ref 17–32)
COLOR SPEC: YELLOW — SIGNIFICANT CHANGE UP
CREAT SERPL-MCNC: 0.5 MG/DL — LOW (ref 0.7–1.5)
DIFF PNL FLD: NEGATIVE — SIGNIFICANT CHANGE UP
EGFR: 123 ML/MIN/1.73M2 — SIGNIFICANT CHANGE UP
EOSINOPHIL # BLD AUTO: 0.12 K/UL — SIGNIFICANT CHANGE UP (ref 0–0.7)
EOSINOPHIL NFR BLD AUTO: 1 % — SIGNIFICANT CHANGE UP (ref 0–8)
GLUCOSE SERPL-MCNC: 150 MG/DL — HIGH (ref 70–99)
GLUCOSE UR QL: 500 MG/DL
HCG SERPL QL: NEGATIVE — SIGNIFICANT CHANGE UP
HCT VFR BLD CALC: 44.5 % — SIGNIFICANT CHANGE UP (ref 37–47)
HGB BLD-MCNC: 14.6 G/DL — SIGNIFICANT CHANGE UP (ref 12–16)
IMM GRANULOCYTES NFR BLD AUTO: 0.8 % — HIGH (ref 0.1–0.3)
KETONES UR-MCNC: NEGATIVE MG/DL — SIGNIFICANT CHANGE UP
LEUKOCYTE ESTERASE UR-ACNC: NEGATIVE — SIGNIFICANT CHANGE UP
LIDOCAIN IGE QN: 12 U/L — SIGNIFICANT CHANGE UP (ref 7–60)
LYMPHOCYTES # BLD AUTO: 2.46 K/UL — SIGNIFICANT CHANGE UP (ref 1.2–3.4)
LYMPHOCYTES # BLD AUTO: 20.5 % — SIGNIFICANT CHANGE UP (ref 20.5–51.1)
MCHC RBC-ENTMCNC: 29.7 PG — SIGNIFICANT CHANGE UP (ref 27–31)
MCHC RBC-ENTMCNC: 32.8 G/DL — SIGNIFICANT CHANGE UP (ref 32–37)
MCV RBC AUTO: 90.6 FL — SIGNIFICANT CHANGE UP (ref 81–99)
MONOCYTES # BLD AUTO: 0.76 K/UL — HIGH (ref 0.1–0.6)
MONOCYTES NFR BLD AUTO: 6.3 % — SIGNIFICANT CHANGE UP (ref 1.7–9.3)
NEUTROPHILS # BLD AUTO: 8.5 K/UL — HIGH (ref 1.4–6.5)
NEUTROPHILS NFR BLD AUTO: 70.8 % — SIGNIFICANT CHANGE UP (ref 42.2–75.2)
NITRITE UR-MCNC: NEGATIVE — SIGNIFICANT CHANGE UP
NRBC # BLD: 0 /100 WBCS — SIGNIFICANT CHANGE UP (ref 0–0)
PH UR: 5.5 — SIGNIFICANT CHANGE UP (ref 5–8)
PLATELET # BLD AUTO: 397 K/UL — SIGNIFICANT CHANGE UP (ref 130–400)
PMV BLD: 9.9 FL — SIGNIFICANT CHANGE UP (ref 7.4–10.4)
POTASSIUM SERPL-MCNC: 4 MMOL/L — SIGNIFICANT CHANGE UP (ref 3.5–5)
POTASSIUM SERPL-SCNC: 4 MMOL/L — SIGNIFICANT CHANGE UP (ref 3.5–5)
PROT SERPL-MCNC: 7 G/DL — SIGNIFICANT CHANGE UP (ref 6–8)
PROT UR-MCNC: SIGNIFICANT CHANGE UP MG/DL
RBC # BLD: 4.91 M/UL — SIGNIFICANT CHANGE UP (ref 4.2–5.4)
RBC # FLD: 13.6 % — SIGNIFICANT CHANGE UP (ref 11.5–14.5)
SODIUM SERPL-SCNC: 136 MMOL/L — SIGNIFICANT CHANGE UP (ref 135–146)
SP GR SPEC: 1.02 — SIGNIFICANT CHANGE UP (ref 1–1.03)
UROBILINOGEN FLD QL: 1 MG/DL — SIGNIFICANT CHANGE UP (ref 0.2–1)
WBC # BLD: 12.01 K/UL — HIGH (ref 4.8–10.8)
WBC # FLD AUTO: 12.01 K/UL — HIGH (ref 4.8–10.8)

## 2023-08-28 PROCEDURE — 99285 EMERGENCY DEPT VISIT HI MDM: CPT

## 2023-08-28 PROCEDURE — 83690 ASSAY OF LIPASE: CPT

## 2023-08-28 PROCEDURE — 85025 COMPLETE CBC W/AUTO DIFF WBC: CPT

## 2023-08-28 PROCEDURE — 80053 COMPREHEN METABOLIC PANEL: CPT

## 2023-08-28 PROCEDURE — 93005 ELECTROCARDIOGRAM TRACING: CPT

## 2023-08-28 PROCEDURE — 99285 EMERGENCY DEPT VISIT HI MDM: CPT | Mod: 25

## 2023-08-28 PROCEDURE — 96375 TX/PRO/DX INJ NEW DRUG ADDON: CPT

## 2023-08-28 PROCEDURE — 82962 GLUCOSE BLOOD TEST: CPT

## 2023-08-28 PROCEDURE — 74177 CT ABD & PELVIS W/CONTRAST: CPT | Mod: MA

## 2023-08-28 PROCEDURE — 84703 CHORIONIC GONADOTROPIN ASSAY: CPT

## 2023-08-28 PROCEDURE — 96374 THER/PROPH/DIAG INJ IV PUSH: CPT | Mod: XU

## 2023-08-28 PROCEDURE — 36415 COLL VENOUS BLD VENIPUNCTURE: CPT

## 2023-08-28 PROCEDURE — 87086 URINE CULTURE/COLONY COUNT: CPT

## 2023-08-28 PROCEDURE — 81003 URINALYSIS AUTO W/O SCOPE: CPT

## 2023-08-28 PROCEDURE — 84484 ASSAY OF TROPONIN QUANT: CPT

## 2023-08-28 RX ORDER — FAMOTIDINE 10 MG/ML
20 INJECTION INTRAVENOUS ONCE
Refills: 0 | Status: COMPLETED | OUTPATIENT
Start: 2023-08-28 | End: 2023-08-28

## 2023-08-28 RX ORDER — SODIUM CHLORIDE 9 MG/ML
1000 INJECTION INTRAMUSCULAR; INTRAVENOUS; SUBCUTANEOUS ONCE
Refills: 0 | Status: COMPLETED | OUTPATIENT
Start: 2023-08-28 | End: 2023-08-28

## 2023-08-28 RX ORDER — KETOROLAC TROMETHAMINE 30 MG/ML
15 SYRINGE (ML) INJECTION ONCE
Refills: 0 | Status: DISCONTINUED | OUTPATIENT
Start: 2023-08-28 | End: 2023-08-28

## 2023-08-28 RX ORDER — LAMOTRIGINE 25 MG/1
25 TABLET ORAL
Qty: 30 | Refills: 0 | Status: DISCONTINUED | COMMUNITY
Start: 2023-07-27 | End: 2023-08-28

## 2023-08-28 RX ORDER — ONDANSETRON 8 MG/1
4 TABLET, FILM COATED ORAL ONCE
Refills: 0 | Status: DISCONTINUED | OUTPATIENT
Start: 2023-08-28 | End: 2023-08-29

## 2023-08-28 RX ADMIN — SODIUM CHLORIDE 1000 MILLILITER(S): 9 INJECTION INTRAMUSCULAR; INTRAVENOUS; SUBCUTANEOUS at 22:20

## 2023-08-28 RX ADMIN — FAMOTIDINE 20 MILLIGRAM(S): 10 INJECTION INTRAVENOUS at 22:21

## 2023-08-28 RX ADMIN — OFLOXACIN OTIC SOLUTION 10 DROP(S): 3 SOLUTION/ DROPS AURICULAR (OTIC) at 00:01

## 2023-08-28 NOTE — ED PROVIDER NOTE - PHYSICAL EXAMINATION
CONST: well appearing for age, NAD  HEAD:  normocephalic, atraumatic  EYES:  conjunctivae without injection, drainage or discharge  ENMT: nasal mucosa moist; mouth moist without ulcerations or lesions; throat moist without erythema, exudate, ulcerations or lesions  NECK:  supple  CARDIAC:  regular rate and rhythm, normal S1 and S2, no murmurs, rubs or gallops  RESP:  respiratory rate and effort appear normal for age; lungs are clear to auscultation bilaterally; no rales or wheezes  ABDOMEN:  soft, nontender, nondistended  MUSCULOSKELETAL/NEURO: awake and alert, normal movement, normal tone  SKIN:  normal skin color for age and race, well-perfused; warm and dry

## 2023-08-28 NOTE — ED PROVIDER NOTE - NSFOLLOWUPINSTRUCTIONS_ED_ALL_ED_FT
Colitis    WHAT YOU NEED TO KNOW:    Colitis is swelling and irritation of your colon. Colitis may be caused by ulcers or a problem with your immune system. Bacteria, a virus, or a parasite may also cause colitis. The cause may not be known. You may have diarrhea, abdominal pain, fever, or blood or mucus in your bowel movement.    DISCHARGE INSTRUCTIONS:    Return to the emergency department if:    You have sudden trouble breathing.    Your bowel movements are black or have blood in them.    You have blood in your vomit.    You have severe abdominal pain or your abdomen is swollen and feels hard.    You have any of the following signs of dehydration:  Dizziness or weakness    Dry mouth, cracked lips, or severe thirst    Fast heartbeat or breathing    Urinating very little or not at all  Call your doctor if:    Your symptoms get worse or do not go away.    You have a fever, chills, cough, or feel weak and achy.    You suddenly lose weight without trying.    You have questions or concerns about your condition or care.  Medicines:    Medicines may be given to decrease inflammation in your colon and treat diarrhea.    Take your medicine as directed. Contact your healthcare provider if you think your medicine is not helping or if you have side effects. Tell your provider if you are allergic to any medicine. Keep a list of the medicines, vitamins, and herbs you take. Include the amounts, and when and why you take them. Bring the list or the pill bottles to follow-up visits. Carry your medicine list with you in case of an emergency.  Manage your symptoms:    Drink liquids as directed to help prevent dehydration. Good liquids to drink include water, juice, and broth. Ask how much liquid to drink each day. You may need to drink an oral rehydration solution (ORS). An ORS contains a balance of water, salt, and sugar to replace body fluids lost during diarrhea.    Eat a variety of healthy foods. Healthy foods include fruits, vegetables, whole-grain breads, beans, low-fat dairy products, lean meats, and fish. You may need to eat several small meals throughout the day instead of large meals. Avoid spicy foods, caffeine, chocolate, and foods high in fat.    Talk to your healthcare provider before you take NSAIDs. NSAIDs can cause worsen your symptoms if ulcers are causing your colitis.    Start to exercise when you feel better. Regular exercise helps your bowels work normally. Ask about the best exercise plan for you.  Prevent the spread of germs:      Wash your hands often. Wash your hands several times each day. Wash after you use the bathroom, change a child's diaper, and before you prepare or eat food. Use soap and water every time. Rub your soapy hands together, lacing your fingers. Wash the front and back of your hands, and in between your fingers. Use the fingers of one hand to scrub under the fingernails of the other hand. Wash for at least 20 seconds. Rinse with warm, running water for several seconds. Then dry your hands with a clean towel or paper towel. Use hand  that contains alcohol if soap and water are not available. Do not touch your eyes, nose, or mouth without washing your hands first.  Handwashing      Cover a sneeze or cough. Use a tissue that covers your mouth and nose. Throw the tissue away in a trash can right away. Use the bend of your arm if a tissue is not available. Wash your hands well with soap and water or use a hand .    Clean surfaces often. Clean doorknobs, countertops, cell phones, and other surfaces that are touched often. Use a disinfecting wipe, a single-use sponge, or a cloth you can wash and reuse. Use disinfecting  if you do not have wipes. You can create a disinfecting  by mixing 1 part bleach with 10 parts water.    Ask about vaccines you may need. Vaccines help prevent disease caused by some viruses and bacteria. Get the influenza (flu) vaccine as soon as recommended each year. The flu vaccine is usually available starting in September or October. Flu viruses change, so it is important to get a flu vaccine every year. Get the pneumonia vaccine if recommended. This vaccine is usually recommended every 5 years. Your provider will tell you when to get this vaccine, if needed. Your healthcare provider can tell you if you should get other vaccines, and when to get them.  Follow up with your doctor as directed: You may need to return for a colonoscopy or other tests. Write down how often you have a bowel movements and what they look like. Bring this to your follow-up visits. Write down your questions so you remember to ask them during your visits.

## 2023-08-28 NOTE — ED PROVIDER NOTE - ATTENDING CONTRIBUTION TO CARE
Pt presenting today with abdominal pain and diarrhea. Patient did not have significant improvement in symptoms. Patient signed out pending CT scan.

## 2023-08-28 NOTE — ED PROVIDER NOTE - OBJECTIVE STATEMENT
Pt is a 39 y/o female with PMH of GERD, depression and DM presenting for nausea today. Pt reports epigastric abdominal pain with nausea and diarrhea. No fever, dysuria or hematuria.

## 2023-08-28 NOTE — ED PROVIDER NOTE - PATIENT PORTAL LINK FT
You can access the FollowMyHealth Patient Portal offered by [facility] by registering at the following website: [email or web address]. By joining [facility]’s FollowMyHealth portal, you will also be able to view your health information using other applications (apps) compatible with our system.

## 2023-08-29 LAB
CULTURE RESULTS: SIGNIFICANT CHANGE UP
SPECIMEN SOURCE: SIGNIFICANT CHANGE UP
TROPONIN T SERPL-MCNC: <0.01 NG/ML — SIGNIFICANT CHANGE UP

## 2023-08-29 PROCEDURE — 93010 ELECTROCARDIOGRAM REPORT: CPT

## 2023-08-29 PROCEDURE — 74177 CT ABD & PELVIS W/CONTRAST: CPT | Mod: 26,MA

## 2023-08-29 RX ORDER — LEVOFLOXACIN 5 MG/ML
1 INJECTION, SOLUTION INTRAVENOUS
Qty: 7 | Refills: 0
Start: 2023-08-29 | End: 2023-09-04

## 2023-08-29 RX ADMIN — Medication 15 MILLIGRAM(S): at 02:10

## 2023-08-29 RX ADMIN — Medication 30 MILLILITER(S): at 02:07

## 2023-08-29 NOTE — ED ADULT NURSE NOTE - NSFALLHARMRISKINTERV_ED_ALL_ED
Assistance OOB with selected safe patient handling equipment if applicable/Assistance with ambulation/Communicate risk of Fall with Harm to all staff, patient, and family/Encourage patient to sit up slowly, dangle for a short time, stand at bedside before walking/Monitor gait and stability/Orthostatic vital signs/Provide visual cue: red socks, yellow wristband, yellow gown, etc/Reinforce activity limits and safety measures with patient and family/Bed in lowest position, wheels locked, appropriate side rails in place/Call bell, personal items and telephone in reach/Instruct patient to call for assistance before getting out of bed/chair/stretcher/Non-slip footwear applied when patient is off stretcher/[place] to call system/Physically safe environment - no spills, clutter or unnecessary equipment/Purposeful Proactive Rounding/Room/bathroom lighting operational, light cord in reach

## 2023-09-03 ENCOUNTER — EMERGENCY (EMERGENCY)
Facility: HOSPITAL | Age: 38
LOS: 0 days | Discharge: HOME | End: 2023-09-04
Attending: EMERGENCY MEDICINE
Payer: MEDICAID

## 2023-09-03 VITALS
HEIGHT: 64 IN | OXYGEN SATURATION: 97 % | RESPIRATION RATE: 17 BRPM | TEMPERATURE: 97 F | WEIGHT: 190.04 LBS | HEART RATE: 111 BPM | DIASTOLIC BLOOD PRESSURE: 88 MMHG | SYSTOLIC BLOOD PRESSURE: 142 MMHG

## 2023-09-03 DIAGNOSIS — R30.0 DYSURIA: ICD-10-CM

## 2023-09-03 DIAGNOSIS — Z88.1 ALLERGY STATUS TO OTHER ANTIBIOTIC AGENTS STATUS: ICD-10-CM

## 2023-09-03 DIAGNOSIS — Z88.0 ALLERGY STATUS TO PENICILLIN: ICD-10-CM

## 2023-09-03 DIAGNOSIS — R10.33 PERIUMBILICAL PAIN: ICD-10-CM

## 2023-09-03 DIAGNOSIS — F32.A DEPRESSION, UNSPECIFIED: ICD-10-CM

## 2023-09-03 DIAGNOSIS — Z88.7 ALLERGY STATUS TO SERUM AND VACCINE: ICD-10-CM

## 2023-09-03 DIAGNOSIS — Z88.8 ALLERGY STATUS TO OTHER DRUGS, MEDICAMENTS AND BIOLOGICAL SUBSTANCES: ICD-10-CM

## 2023-09-03 DIAGNOSIS — K21.9 GASTRO-ESOPHAGEAL REFLUX DISEASE WITHOUT ESOPHAGITIS: ICD-10-CM

## 2023-09-03 DIAGNOSIS — D72.829 ELEVATED WHITE BLOOD CELL COUNT, UNSPECIFIED: ICD-10-CM

## 2023-09-03 LAB
ALBUMIN SERPL ELPH-MCNC: 4.1 G/DL — SIGNIFICANT CHANGE UP (ref 3.5–5.2)
ALP SERPL-CCNC: 141 U/L — HIGH (ref 30–115)
ALT FLD-CCNC: 50 U/L — HIGH (ref 0–41)
ANION GAP SERPL CALC-SCNC: 14 MMOL/L — SIGNIFICANT CHANGE UP (ref 7–14)
AST SERPL-CCNC: 22 U/L — SIGNIFICANT CHANGE UP (ref 0–41)
BASOPHILS # BLD AUTO: 0.1 K/UL — SIGNIFICANT CHANGE UP (ref 0–0.2)
BASOPHILS NFR BLD AUTO: 0.7 % — SIGNIFICANT CHANGE UP (ref 0–1)
BILIRUB SERPL-MCNC: <0.2 MG/DL — SIGNIFICANT CHANGE UP (ref 0.2–1.2)
BUN SERPL-MCNC: 9 MG/DL — LOW (ref 10–20)
CALCIUM SERPL-MCNC: 9.8 MG/DL — SIGNIFICANT CHANGE UP (ref 8.4–10.5)
CHLORIDE SERPL-SCNC: 98 MMOL/L — SIGNIFICANT CHANGE UP (ref 98–110)
CO2 SERPL-SCNC: 22 MMOL/L — SIGNIFICANT CHANGE UP (ref 17–32)
CREAT SERPL-MCNC: 0.5 MG/DL — LOW (ref 0.7–1.5)
EGFR: 123 ML/MIN/1.73M2 — SIGNIFICANT CHANGE UP
EOSINOPHIL # BLD AUTO: 0.09 K/UL — SIGNIFICANT CHANGE UP (ref 0–0.7)
EOSINOPHIL NFR BLD AUTO: 0.6 % — SIGNIFICANT CHANGE UP (ref 0–8)
GLUCOSE SERPL-MCNC: 202 MG/DL — HIGH (ref 70–99)
HCG SERPL QL: NEGATIVE — SIGNIFICANT CHANGE UP
HCT VFR BLD CALC: 44.7 % — SIGNIFICANT CHANGE UP (ref 37–47)
HGB BLD-MCNC: 15 G/DL — SIGNIFICANT CHANGE UP (ref 12–16)
IMM GRANULOCYTES NFR BLD AUTO: 1.8 % — HIGH (ref 0.1–0.3)
LACTATE SERPL-SCNC: 0.9 MMOL/L — SIGNIFICANT CHANGE UP (ref 0.7–2)
LIDOCAIN IGE QN: 12 U/L — SIGNIFICANT CHANGE UP (ref 7–60)
LYMPHOCYTES # BLD AUTO: 15.9 % — LOW (ref 20.5–51.1)
LYMPHOCYTES # BLD AUTO: 2.42 K/UL — SIGNIFICANT CHANGE UP (ref 1.2–3.4)
MAGNESIUM SERPL-MCNC: 1.8 MG/DL — SIGNIFICANT CHANGE UP (ref 1.8–2.4)
MCHC RBC-ENTMCNC: 29.6 PG — SIGNIFICANT CHANGE UP (ref 27–31)
MCHC RBC-ENTMCNC: 33.6 G/DL — SIGNIFICANT CHANGE UP (ref 32–37)
MCV RBC AUTO: 88.2 FL — SIGNIFICANT CHANGE UP (ref 81–99)
MONOCYTES # BLD AUTO: 0.95 K/UL — HIGH (ref 0.1–0.6)
MONOCYTES NFR BLD AUTO: 6.3 % — SIGNIFICANT CHANGE UP (ref 1.7–9.3)
NEUTROPHILS # BLD AUTO: 11.36 K/UL — HIGH (ref 1.4–6.5)
NEUTROPHILS NFR BLD AUTO: 74.7 % — SIGNIFICANT CHANGE UP (ref 42.2–75.2)
NRBC # BLD: 0 /100 WBCS — SIGNIFICANT CHANGE UP (ref 0–0)
PLATELET # BLD AUTO: 413 K/UL — HIGH (ref 130–400)
PMV BLD: 10.2 FL — SIGNIFICANT CHANGE UP (ref 7.4–10.4)
POTASSIUM SERPL-MCNC: 4.5 MMOL/L — SIGNIFICANT CHANGE UP (ref 3.5–5)
POTASSIUM SERPL-SCNC: 4.5 MMOL/L — SIGNIFICANT CHANGE UP (ref 3.5–5)
PROT SERPL-MCNC: 6.9 G/DL — SIGNIFICANT CHANGE UP (ref 6–8)
RBC # BLD: 5.07 M/UL — SIGNIFICANT CHANGE UP (ref 4.2–5.4)
RBC # FLD: 13.7 % — SIGNIFICANT CHANGE UP (ref 11.5–14.5)
SODIUM SERPL-SCNC: 134 MMOL/L — LOW (ref 135–146)
WBC # BLD: 15.19 K/UL — HIGH (ref 4.8–10.8)
WBC # FLD AUTO: 15.19 K/UL — HIGH (ref 4.8–10.8)

## 2023-09-03 PROCEDURE — 96374 THER/PROPH/DIAG INJ IV PUSH: CPT

## 2023-09-03 PROCEDURE — 81003 URINALYSIS AUTO W/O SCOPE: CPT

## 2023-09-03 PROCEDURE — 83690 ASSAY OF LIPASE: CPT

## 2023-09-03 PROCEDURE — 99284 EMERGENCY DEPT VISIT MOD MDM: CPT | Mod: 25

## 2023-09-03 PROCEDURE — 99285 EMERGENCY DEPT VISIT HI MDM: CPT

## 2023-09-03 PROCEDURE — 96375 TX/PRO/DX INJ NEW DRUG ADDON: CPT

## 2023-09-03 PROCEDURE — 36415 COLL VENOUS BLD VENIPUNCTURE: CPT

## 2023-09-03 PROCEDURE — 87086 URINE CULTURE/COLONY COUNT: CPT

## 2023-09-03 PROCEDURE — 85025 COMPLETE CBC W/AUTO DIFF WBC: CPT

## 2023-09-03 PROCEDURE — 83605 ASSAY OF LACTIC ACID: CPT

## 2023-09-03 PROCEDURE — 83735 ASSAY OF MAGNESIUM: CPT

## 2023-09-03 PROCEDURE — 80053 COMPREHEN METABOLIC PANEL: CPT

## 2023-09-03 PROCEDURE — 74177 CT ABD & PELVIS W/CONTRAST: CPT | Mod: MA

## 2023-09-03 PROCEDURE — 84703 CHORIONIC GONADOTROPIN ASSAY: CPT

## 2023-09-03 RX ORDER — KETOROLAC TROMETHAMINE 30 MG/ML
30 SYRINGE (ML) INJECTION ONCE
Refills: 0 | Status: DISCONTINUED | OUTPATIENT
Start: 2023-09-03 | End: 2023-09-03

## 2023-09-03 RX ORDER — SODIUM CHLORIDE 9 MG/ML
1000 INJECTION INTRAMUSCULAR; INTRAVENOUS; SUBCUTANEOUS ONCE
Refills: 0 | Status: COMPLETED | OUTPATIENT
Start: 2023-09-03 | End: 2023-09-03

## 2023-09-03 RX ORDER — ONDANSETRON 8 MG/1
4 TABLET, FILM COATED ORAL ONCE
Refills: 0 | Status: COMPLETED | OUTPATIENT
Start: 2023-09-03 | End: 2023-09-03

## 2023-09-03 RX ADMIN — Medication 30 MILLIGRAM(S): at 22:24

## 2023-09-03 RX ADMIN — ONDANSETRON 4 MILLIGRAM(S): 8 TABLET, FILM COATED ORAL at 21:54

## 2023-09-03 RX ADMIN — Medication 30 MILLIGRAM(S): at 21:54

## 2023-09-03 RX ADMIN — SODIUM CHLORIDE 1000 MILLILITER(S): 9 INJECTION INTRAMUSCULAR; INTRAVENOUS; SUBCUTANEOUS at 21:54

## 2023-09-03 NOTE — ED PROVIDER NOTE - NSFOLLOWUPINSTRUCTIONS_ED_ALL_ED_FT
Patient visible in dayroom. Pleasant on approach and smiles when engaged in conversation. Denies SI/HI/AH. Contracts for safety on unit. Compliant with medication regime. Attends group activities. Will continue to monitor. Abdominal Pain, Adult  Abdominal pain can be caused by many things. Often, abdominal pain is not serious and it gets better with no treatment or by being treated at home. However, sometimes abdominal pain is serious. Your health care provider will do a medical history and a physical exam to try to determine the cause of your abdominal pain.    Follow these instructions at home:  Take over-the-counter and prescription medicines only as told by your health care provider. Do not take a laxative unless told by your health care provider.  ImageDrink enough fluid to keep your urine clear or pale yellow.  Watch your condition for any changes.  Keep all follow-up visits as told by your health care provider. This is important.  Contact a health care provider if:  Your abdominal pain changes or gets worse.  You are not hungry or you lose weight without trying.  You are constipated or have diarrhea for more than 2–3 days.  You have pain when you urinate or have a bowel movement.  Your abdominal pain wakes you up at night.  Your pain gets worse with meals, after eating, or with certain foods.  You are throwing up and cannot keep anything down.  You have a fever.  Get help right away if:  Your pain does not go away as soon as your health care provider told you to expect.  You cannot stop throwing up.  Your pain is only in areas of the abdomen, such as the right side or the left lower portion of the abdomen.  You have bloody or black stools, or stools that look like tar.  You have severe pain, cramping, or bloating in your abdomen.  You have signs of dehydration, such as:    Dark urine, very little urine, or no urine.  Cracked lips.  Dry mouth.  Sunken eyes.  Sleepiness.  Weakness.    This information is not intended to replace advice given to you by your health care provider. Make sure you discuss any questions you have with your health care provider.

## 2023-09-03 NOTE — ED PROVIDER NOTE - OBJECTIVE STATEMENT
38 year old female with a 38 year old female with a history of GERD, depression and DM presents to the ED with abdominal pain. Patient was evaluated for similar pain and diarrhea on 08/29 diagnosed with colitis and has been taking Levaquin. Since she started the Levaquin diarrhea has resolved and pain was improving. Today pain worsened located below her umbilicus. Admits to dysuria but denies hematuria and flank pain. Denies fever, chills, chest pain, shortness of breath, nausea, vomiting, constipation, dysuria, hematuria, lower extremity swelling, rash.

## 2023-09-03 NOTE — ED ADULT TRIAGE NOTE - CHIEF COMPLAINT QUOTE
BIBA from [facility] for abdominal pain x1 week, was diagnosed with colitis and taking antibiotics for it  states pain is not getting better

## 2023-09-03 NOTE — ED PROVIDER NOTE - CARE PROVIDER_API CALL
Leonardo Mejia  Gastroenterology  [address] [place] [id number]  Phone: [phone]  Fax: [phone]  Follow Up Time:

## 2023-09-03 NOTE — ED PROVIDER NOTE - ATTENDING APP SHARED VISIT CONTRIBUTION OF CARE
38-year-old female with past medical history of diabetes currently taking levofloxacin for colitis presents to the emergency department for abdominal pain.  Patient states today the pain got worse.  No nausea no vomiting no diarrhea no constipation.    Const: NAD  Eyes: PERRL, no conjunctival injection  HENT:  Neck supple without meningismus   CV: RRR, Warm, well-perfused extremities  RESP: CTA B/L, no tachypnea   GI: soft, non-tender, non-distended  MSK: No gross deformities appreciated  Skin: Warm, dry. No rashes  Neuro: Alert, CNs II-XII grossly intact. Sensation and motor function of extremities grossly intact.

## 2023-09-03 NOTE — ED PROVIDER NOTE - PHYSICAL EXAMINATION
Physical Exam    Constitutional: No acute distress.   Eyes: Conjunctiva pink, Sclera clear, PERRLA, EOMI.  ENT: No sinus tenderness. No nasal discharge. No oropharyngeal erythema, edema, or exudates. Uvula midline.   Cardiovascular: Regular rate, regular rhythm. No noted murmurs rubs or gallops.  Respiratory: unlabored respiratory effort, clear to auscultation bilaterally no wheezing, rales or rhonchi  Gastrointestinal: Normal bowel sounds. soft, non distended, Diffuse abdominal tenderness   Musculoskeletal: supple neck, no midline tenderness. No joint or bony deformity.   Integumentary: warm, dry, no rash  Neurologic: awake, alert, cranial nerves II-XII grossly intact, extremities’ motor and sensory functions grossly intact

## 2023-09-03 NOTE — ED PROVIDER NOTE - PATIENT PORTAL LINK FT
You can access the FollowMyHealth Patient Portal offered by [facility] by registering at the following website: [email or web address]. By joining [facility]’s FollowMyHealth portal, you will also be able to view your health information using other applications (apps) compatible with our system.

## 2023-09-03 NOTE — ED PROVIDER NOTE - CLINICAL SUMMARY MEDICAL DECISION MAKING FREE TEXT BOX
38-year-old female presents emergency department for abdominal pain context of currently being treated for colitis.  Labs demonstrate a mildly elevated white blood cell count but otherwise unremarkable.  CT scan did not demonstrate any acute pathology.  DC back to [place].

## 2023-09-03 NOTE — ED ADULT TRIAGE NOTE - SPO2 (%)
Identified pt with two pt identifiers(name and ). Reviewed record in preparation for visit and have obtained necessary documentation. Chief Complaint   Patient presents with    Other     Lump on lower right side of back, Discomfort when bending over,lump was pea size for about five years and now it is flat and size of a thumb,         Health Maintenance Due   Topic    Hepatitis C Screening     DTaP/Tdap/Td series (1 - Tdap)    PAP AKA CERVICAL CYTOLOGY     Flu Vaccine (1)       Visit Vitals  /76   Pulse 81   Temp 98.3 °F (36.8 °C) (Temporal)   Resp 18   Ht 5' 4\" (1.626 m)   Wt 171 lb (77.6 kg)   SpO2 100%   BMI 29.35 kg/m²         Coordination of Care Questionnaire:  :   1) Have you been to an emergency room, urgent care, or hospitalized since your last visit? If yes, where when, and reason for visit? NO      2. Have seen or consulted any other health care provider since your last visit? If yes, where when, and reason for visit? NO      Patient is accompanied by SELF  I have received verbal consent from [facility] to discuss any/all medical information while they are present in the room. 97

## 2023-09-04 VITALS
DIASTOLIC BLOOD PRESSURE: 74 MMHG | OXYGEN SATURATION: 98 % | SYSTOLIC BLOOD PRESSURE: 126 MMHG | TEMPERATURE: 98 F | RESPIRATION RATE: 18 BRPM | HEART RATE: 108 BPM

## 2023-09-04 LAB
APPEARANCE UR: CLEAR — SIGNIFICANT CHANGE UP
BILIRUB UR-MCNC: NEGATIVE — SIGNIFICANT CHANGE UP
COLOR SPEC: YELLOW — SIGNIFICANT CHANGE UP
DIFF PNL FLD: NEGATIVE — SIGNIFICANT CHANGE UP
GLUCOSE UR QL: >=1000 MG/DL
KETONES UR-MCNC: 15 MG/DL
LEUKOCYTE ESTERASE UR-ACNC: NEGATIVE — SIGNIFICANT CHANGE UP
NITRITE UR-MCNC: NEGATIVE — SIGNIFICANT CHANGE UP
PH UR: 5.5 — SIGNIFICANT CHANGE UP (ref 5–8)
PROT UR-MCNC: SIGNIFICANT CHANGE UP MG/DL
SP GR SPEC: 1.03 — SIGNIFICANT CHANGE UP (ref 1–1.03)
UROBILINOGEN FLD QL: 0.2 MG/DL — SIGNIFICANT CHANGE UP (ref 0.2–1)

## 2023-09-04 PROCEDURE — 74177 CT ABD & PELVIS W/CONTRAST: CPT | Mod: 26,MA

## 2023-09-04 RX ORDER — SUCRALFATE 1 G
10 TABLET ORAL
Qty: 210 | Refills: 0
Start: 2023-09-04 | End: 2023-09-10

## 2023-09-04 RX ADMIN — Medication 30 MILLILITER(S): at 03:05

## 2023-09-04 NOTE — ED ADULT NURSE NOTE - NSFALLUNIVINTERV_ED_ALL_ED
Bed/Stretcher in lowest position, wheels locked, appropriate side rails in place/Call bell, personal items and telephone in reach/Instruct patient to call for assistance before getting out of bed/chair/stretcher/Non-slip footwear applied when patient is off stretcher/[place] to call system/Physically safe environment - no spills, clutter or unnecessary equipment/Purposeful proactive rounding/Room/bathroom lighting operational, light cord in reach

## 2023-09-04 NOTE — ED ADULT NURSE NOTE - OBJECTIVE STATEMENT
BIBA from [facility] for abdominal pain x1 week, was diagnosed with colitis and taking antibiotics for it  states pain is not getting better

## 2023-09-04 NOTE — ED ADULT NURSE NOTE - CHIEF COMPLAINT QUOTE
BIBA from [facility] for abdominal pain x1 week, was diagnosed with colitis and taking antibiotics for it  states pain is not getting better

## 2023-09-05 LAB
CULTURE RESULTS: SIGNIFICANT CHANGE UP
SPECIMEN SOURCE: SIGNIFICANT CHANGE UP

## 2023-09-07 ENCOUNTER — APPOINTMENT (OUTPATIENT)
Dept: PSYCHIATRY | Facility: CLINIC | Age: 38
End: 2023-09-07
Payer: MEDICAID

## 2023-09-07 ENCOUNTER — NON-APPOINTMENT (OUTPATIENT)
Age: 38
End: 2023-09-07

## 2023-09-07 ENCOUNTER — OUTPATIENT (OUTPATIENT)
Dept: OUTPATIENT SERVICES | Facility: HOSPITAL | Age: 38
LOS: 1 days | End: 2023-09-07
Payer: MEDICAID

## 2023-09-07 DIAGNOSIS — F33.1 MAJOR DEPRESSIVE DISORDER, RECURRENT, MODERATE: ICD-10-CM

## 2023-09-07 PROCEDURE — 99213 OFFICE O/P EST LOW 20 MIN: CPT

## 2023-09-07 PROCEDURE — ZZZZZ: CPT

## 2023-09-07 PROCEDURE — 90832 PSYTX W PT 30 MINUTES: CPT

## 2023-09-07 NOTE — DISCUSSION/SUMMARY
[FreeTextEntry1] : Ms. Ania Mei is a 37 y/o single  female., domiciled at [facility], [facility] DM type 1, DM neuropathy, fatty liver, migraine HA, pseudoseizures, pph ARMAAN, cluster B, 6 prior [facility] admissions most recenlty 6/2023, 4/2023, 4 prior SA (drinking capeful of acetone, drinking hydrogen peroxide, taking 1/2 bottle ibuprofen, and drinking pinesol/bleach).    Ania most recently  reported severe severe stressors sine beginning of 2020 , including homelessness , changes in living situation, stress over the covid pandemic with losses within the family including his father on 11/28/22.  Ania reported increased of anxiety with episodes of falling, generalized shakiness, head pressure and inability to move, patients EEG and video EEG were reportedly negative for any seizure activity.   On visit today, pt reports that she continues to grief the loss of her father. No SI since [facility]c from [facility]. Discussed coping skills for suicidal thoughts should they return. Will continue current regimen which she has been tolerating overall with decrease from lamictal 125 mg to 100 mg.  No homicidal thoughts. No overt psychosis or alvina on exam. Patient has appropriate protective factors in place. Is engaged in his treatment. No acute safety concerns. Outpatient level of care remains appropriate.   Pt is not an imminent risk to self. She remains at a chronically elevated risks due to history of previous suicidal gestures and attempts, history of hospitalization, mood deregulation, hx of impulsivity. This however is mitigated by future talk, no current SI w/ intent or plan, identifies reasons for living, engaged in outpatient treatment, living in closely monitored facility. Furthermore, pt chronic risk will not be mitigated by inpatient hospitalization. She is not an imminent risk to self/other and outpatient level of care remains appropriate.    Safety plan discussed at lengths w/ pt/mother. Pt reports that she will call 911 or go to the nearest Emergency room should she become an imminent risk to self/other. Pt will also utilize suicide hotline number provided.   current diagnostic impression: somatic symptom disorder, ARMAAN, cluster B traits r/o dependent personality disorder   decrease Lamictal to 75 mg   Escitalopram 20mg po daily continue individual therapy w/ Suzan  RTC in 4 weeks

## 2023-09-07 NOTE — PHYSICAL EXAM
[Cooperative] : cooperative [Euthymic] : euthymic [Full] : full [Clear] : clear [Linear/Goal Directed] : linear/goal directed [WNL] : within normal limits [Average] : average [FreeTextEntry2] : uses a walker  [FreeTextEntry5] : dispersed red spots noted on chest and back, nontender, not confluent, no blistering  [FreeTextEntry1] : unable to assess given session being over the phone.  [de-identified] : unable to assess given session being over the phone.  [de-identified] : Fair [de-identified] : Fair

## 2023-09-07 NOTE — REASON FOR VISIT
[Other:___] : due to [unfilled] [Continuing, patient seen in-person within last 12 months] : Telehealth services are continuing as patient has been seen in-person within last 12 months. [Telephone (audio) - Individual/Group] : This telephonic visit was provided via audio only technology. [Medical Office: ([facility])___] : The provider was located at the medical office in [unfilled]. [Home] : The patient, [unfilled], was located at home, [unfilled], at the time of the visit. [Patient] : Patient [Mother] : mother [Follow-Up Visit] : a follow-up visit [FreeTextEntry4] : 10:30 am [FreeTextEntry5] : 11:00 am

## 2023-09-07 NOTE — SOCIAL HISTORY
[Assisted Living Facility] : lives in an assisted living facility [Disabled] : disabled [Never ] : never  [High School] : high school [None] : none [No Known Substance Use] : no known substance use [Yes] : yes [No Known Use] : no known use [FreeTextEntry1] : [facility] for the past 2.5yrs [TextBox_52] : Prescribed

## 2023-09-07 NOTE — HISTORY OF PRESENT ILLNESS
[No] : no [FreeTextEntry1] : \par   [FreeTextEntry2] : five inpatient admissions, most recently 4/2023 for SI, had also reported SA by drinking bleach/pinsole after father's death in 11/2022; prior to this patient has history of 3 remote SA in her teens (approx. 16/18 yo, reports taking capeful acetone, drinking hydrogen peroxide, and half bottle of ibuprofen)\par  multiple failed medication trials \par  multiple medical ED visits for pseudoseizures and other medical complaints \par   [FreeTextEntry3] : celexa\par  cymbalta (not effective)\par  zyprexa\par  buspar (not effective)\par  gabapentin (hives)\par  melatonin  [de-identified] : \par

## 2023-09-07 NOTE — PAST MEDICAL HISTORY
[FreeTextEntry1] : fatty liver \par  DM - type 1 on insulin pump \par  DM neuropathy\par  migraine HA

## 2023-09-08 ENCOUNTER — APPOINTMENT (OUTPATIENT)
Dept: NEUROLOGY | Facility: CLINIC | Age: 38
End: 2023-09-08
Payer: MEDICAID

## 2023-09-08 VITALS
OXYGEN SATURATION: 100 % | HEART RATE: 101 BPM | WEIGHT: 201 LBS | HEIGHT: 64 IN | TEMPERATURE: 97.5 F | DIASTOLIC BLOOD PRESSURE: 87 MMHG | BODY MASS INDEX: 34.31 KG/M2 | SYSTOLIC BLOOD PRESSURE: 135 MMHG

## 2023-09-08 DIAGNOSIS — R25.1 TREMOR, UNSPECIFIED: ICD-10-CM

## 2023-09-08 DIAGNOSIS — F33.1 MAJOR DEPRESSIVE DISORDER, RECURRENT, MODERATE: ICD-10-CM

## 2023-09-08 PROCEDURE — 99214 OFFICE O/P EST MOD 30 MIN: CPT

## 2023-09-08 RX ORDER — LAMOTRIGINE 100 MG/1
100 TABLET ORAL
Qty: 30 | Refills: 0 | Status: DISCONTINUED | COMMUNITY
Start: 2023-07-27 | End: 2023-09-08

## 2023-09-08 NOTE — HISTORY OF PRESENT ILLNESS
[FreeTextEntry1] : CRISTI MONTGOMERY is a 38 year old woman with history of non epileptic seizures, headache and lumbar radiculopathy is here for follow up visits.  Presented to office with her mother. Still has shaking episodes with no LOC.  Headaches are better. Reports more back pain since she has to bend over to use rolling walker. Also has more hand pain and numbness

## 2023-09-08 NOTE — ASSESSMENT
[FreeTextEntry1] : 37 yo female with hx of tremor, DM POLYNEUROPATHY, vulvodynia, clitoral neuropathy and headaches is here for follow up visit. Her headaches are better controlled on Elavil 75 mg qhsShe was diagnosed with non epileptic seizure during her multiple admissions recently to [facility] based on VEEG. She has been following with  psychiatrist. Now planning to taper off Lamictal. getting more back pain and hand numbness 2/2 CTS due to using the rolling backer     # Neuropathy: - Diabetic control  - Prescription to use high rolling walker due to imbalance # Migraine:  - Continue Elavil # Tremor:  Monitor clinically  # Non epileptic seizures:  - Follow up with psychiatrist    RTC in 6 months

## 2023-09-08 NOTE — PHYSICAL EXAM
[FreeTextEntry1] : Mental status: Awake, alert and oriented x3. Recent and remote memory intact. Naming, repetition and comprehension intact. Attention/concentration intact. No dysarthria, no aphasia. Fund of knowledge appropriate. Cranial nerves: Pupils equally round and reactive to light, visual fields full, no nystagmus, extraocular muscles intact, V1 through V3 intact bilaterally and symmetric, face symmetric, hearing intact to finger rub, palate elevation symmetric, tongue was midline.  Motor: MRC grading 5/5 b/l UE/LE.  strength 5/5. Normal tone and bulk. No abnormal movements. O  Sensation: Reduced vibration in the toes.  Coordination: No dysmetria on finger-to-nose and heel-to-shin. No dysdiadokinesia.  Reflexes: 1+ in the knees and absent in the ankles  Gait: Unsteady tandem. No ataxia. Romberg positive.

## 2023-09-11 ENCOUNTER — EMERGENCY (EMERGENCY)
Facility: HOSPITAL | Age: 38
LOS: 0 days | Discharge: HOME | End: 2023-09-11
Attending: EMERGENCY MEDICINE
Payer: MEDICAID

## 2023-09-11 ENCOUNTER — APPOINTMENT (OUTPATIENT)
Dept: INTERNAL MEDICINE | Facility: CLINIC | Age: 38
End: 2023-09-11

## 2023-09-11 VITALS
RESPIRATION RATE: 20 BRPM | SYSTOLIC BLOOD PRESSURE: 128 MMHG | HEART RATE: 92 BPM | OXYGEN SATURATION: 99 % | TEMPERATURE: 98 F | DIASTOLIC BLOOD PRESSURE: 78 MMHG | HEIGHT: 64 IN

## 2023-09-11 DIAGNOSIS — Z88.7 ALLERGY STATUS TO SERUM AND VACCINE: ICD-10-CM

## 2023-09-11 DIAGNOSIS — R10.9 UNSPECIFIED ABDOMINAL PAIN: ICD-10-CM

## 2023-09-11 DIAGNOSIS — F41.9 ANXIETY DISORDER, UNSPECIFIED: ICD-10-CM

## 2023-09-11 DIAGNOSIS — Z88.8 ALLERGY STATUS TO OTHER DRUGS, MEDICAMENTS AND BIOLOGICAL SUBSTANCES: ICD-10-CM

## 2023-09-11 DIAGNOSIS — E11.9 TYPE 2 DIABETES MELLITUS WITHOUT COMPLICATIONS: ICD-10-CM

## 2023-09-11 DIAGNOSIS — K21.9 GASTRO-ESOPHAGEAL REFLUX DISEASE WITHOUT ESOPHAGITIS: ICD-10-CM

## 2023-09-11 DIAGNOSIS — Z88.1 ALLERGY STATUS TO OTHER ANTIBIOTIC AGENTS STATUS: ICD-10-CM

## 2023-09-11 DIAGNOSIS — Z87.19 PERSONAL HISTORY OF OTHER DISEASES OF THE DIGESTIVE SYSTEM: ICD-10-CM

## 2023-09-11 DIAGNOSIS — Z88.0 ALLERGY STATUS TO PENICILLIN: ICD-10-CM

## 2023-09-11 DIAGNOSIS — R06.4 HYPERVENTILATION: ICD-10-CM

## 2023-09-11 LAB
ALBUMIN SERPL ELPH-MCNC: 4.2 G/DL — SIGNIFICANT CHANGE UP (ref 3.5–5.2)
ALP SERPL-CCNC: 139 U/L — HIGH (ref 30–115)
ALT FLD-CCNC: 51 U/L — HIGH (ref 0–41)
ANION GAP SERPL CALC-SCNC: 10 MMOL/L — SIGNIFICANT CHANGE UP (ref 7–14)
APPEARANCE UR: CLEAR — SIGNIFICANT CHANGE UP
AST SERPL-CCNC: 22 U/L — SIGNIFICANT CHANGE UP (ref 0–41)
BASOPHILS # BLD AUTO: 0.08 K/UL — SIGNIFICANT CHANGE UP (ref 0–0.2)
BASOPHILS NFR BLD AUTO: 0.9 % — SIGNIFICANT CHANGE UP (ref 0–1)
BILIRUB SERPL-MCNC: 0.3 MG/DL — SIGNIFICANT CHANGE UP (ref 0.2–1.2)
BILIRUB UR-MCNC: NEGATIVE — SIGNIFICANT CHANGE UP
BUN SERPL-MCNC: 7 MG/DL — LOW (ref 10–20)
CALCIUM SERPL-MCNC: 9.6 MG/DL — SIGNIFICANT CHANGE UP (ref 8.4–10.5)
CHLORIDE SERPL-SCNC: 96 MMOL/L — LOW (ref 98–110)
CO2 SERPL-SCNC: 29 MMOL/L — SIGNIFICANT CHANGE UP (ref 17–32)
COLOR SPEC: YELLOW — SIGNIFICANT CHANGE UP
CREAT SERPL-MCNC: 0.6 MG/DL — LOW (ref 0.7–1.5)
DIFF PNL FLD: NEGATIVE — SIGNIFICANT CHANGE UP
EGFR: 118 ML/MIN/1.73M2 — SIGNIFICANT CHANGE UP
EOSINOPHIL # BLD AUTO: 0.06 K/UL — SIGNIFICANT CHANGE UP (ref 0–0.7)
EOSINOPHIL NFR BLD AUTO: 0.7 % — SIGNIFICANT CHANGE UP (ref 0–8)
GLUCOSE SERPL-MCNC: 268 MG/DL — HIGH (ref 70–99)
GLUCOSE UR QL: >=1000 MG/DL
HCG SERPL QL: NEGATIVE — SIGNIFICANT CHANGE UP
HCT VFR BLD CALC: 43.9 % — SIGNIFICANT CHANGE UP (ref 37–47)
HGB BLD-MCNC: 14.7 G/DL — SIGNIFICANT CHANGE UP (ref 12–16)
IMM GRANULOCYTES NFR BLD AUTO: 0.7 % — HIGH (ref 0.1–0.3)
KETONES UR-MCNC: NEGATIVE MG/DL — SIGNIFICANT CHANGE UP
LEUKOCYTE ESTERASE UR-ACNC: NEGATIVE — SIGNIFICANT CHANGE UP
LIDOCAIN IGE QN: 13 U/L — SIGNIFICANT CHANGE UP (ref 7–60)
LYMPHOCYTES # BLD AUTO: 1.71 K/UL — SIGNIFICANT CHANGE UP (ref 1.2–3.4)
LYMPHOCYTES # BLD AUTO: 18.5 % — LOW (ref 20.5–51.1)
MCHC RBC-ENTMCNC: 30.4 PG — SIGNIFICANT CHANGE UP (ref 27–31)
MCHC RBC-ENTMCNC: 33.5 G/DL — SIGNIFICANT CHANGE UP (ref 32–37)
MCV RBC AUTO: 90.7 FL — SIGNIFICANT CHANGE UP (ref 81–99)
MONOCYTES # BLD AUTO: 0.59 K/UL — SIGNIFICANT CHANGE UP (ref 0.1–0.6)
MONOCYTES NFR BLD AUTO: 6.4 % — SIGNIFICANT CHANGE UP (ref 1.7–9.3)
NEUTROPHILS # BLD AUTO: 6.72 K/UL — HIGH (ref 1.4–6.5)
NEUTROPHILS NFR BLD AUTO: 72.8 % — SIGNIFICANT CHANGE UP (ref 42.2–75.2)
NITRITE UR-MCNC: NEGATIVE — SIGNIFICANT CHANGE UP
NRBC # BLD: 0 /100 WBCS — SIGNIFICANT CHANGE UP (ref 0–0)
PH UR: 6 — SIGNIFICANT CHANGE UP (ref 5–8)
PLATELET # BLD AUTO: 367 K/UL — SIGNIFICANT CHANGE UP (ref 130–400)
PMV BLD: 9.9 FL — SIGNIFICANT CHANGE UP (ref 7.4–10.4)
POTASSIUM SERPL-MCNC: 4.1 MMOL/L — SIGNIFICANT CHANGE UP (ref 3.5–5)
POTASSIUM SERPL-SCNC: 4.1 MMOL/L — SIGNIFICANT CHANGE UP (ref 3.5–5)
PROT SERPL-MCNC: 7.1 G/DL — SIGNIFICANT CHANGE UP (ref 6–8)
PROT UR-MCNC: NEGATIVE MG/DL — SIGNIFICANT CHANGE UP
RBC # BLD: 4.84 M/UL — SIGNIFICANT CHANGE UP (ref 4.2–5.4)
RBC # FLD: 13.8 % — SIGNIFICANT CHANGE UP (ref 11.5–14.5)
SODIUM SERPL-SCNC: 135 MMOL/L — SIGNIFICANT CHANGE UP (ref 135–146)
SP GR SPEC: >1.03 — HIGH (ref 1–1.03)
TROPONIN T SERPL-MCNC: <0.01 NG/ML — SIGNIFICANT CHANGE UP
UROBILINOGEN FLD QL: 0.2 MG/DL — SIGNIFICANT CHANGE UP (ref 0.2–1)
WBC # BLD: 9.22 K/UL — SIGNIFICANT CHANGE UP (ref 4.8–10.8)
WBC # FLD AUTO: 9.22 K/UL — SIGNIFICANT CHANGE UP (ref 4.8–10.8)

## 2023-09-11 PROCEDURE — 99285 EMERGENCY DEPT VISIT HI MDM: CPT

## 2023-09-11 PROCEDURE — 36415 COLL VENOUS BLD VENIPUNCTURE: CPT

## 2023-09-11 PROCEDURE — 81003 URINALYSIS AUTO W/O SCOPE: CPT

## 2023-09-11 PROCEDURE — 83690 ASSAY OF LIPASE: CPT

## 2023-09-11 PROCEDURE — 84703 CHORIONIC GONADOTROPIN ASSAY: CPT

## 2023-09-11 PROCEDURE — 84484 ASSAY OF TROPONIN QUANT: CPT

## 2023-09-11 PROCEDURE — 99284 EMERGENCY DEPT VISIT MOD MDM: CPT | Mod: 25

## 2023-09-11 PROCEDURE — 93010 ELECTROCARDIOGRAM REPORT: CPT

## 2023-09-11 PROCEDURE — 93005 ELECTROCARDIOGRAM TRACING: CPT

## 2023-09-11 PROCEDURE — 87086 URINE CULTURE/COLONY COUNT: CPT

## 2023-09-11 PROCEDURE — 96374 THER/PROPH/DIAG INJ IV PUSH: CPT

## 2023-09-11 PROCEDURE — 80053 COMPREHEN METABOLIC PANEL: CPT

## 2023-09-11 PROCEDURE — 85025 COMPLETE CBC W/AUTO DIFF WBC: CPT

## 2023-09-11 PROCEDURE — 96372 THER/PROPH/DIAG INJ SC/IM: CPT | Mod: XU

## 2023-09-11 RX ORDER — SODIUM CHLORIDE 9 MG/ML
1000 INJECTION, SOLUTION INTRAVENOUS ONCE
Refills: 0 | Status: COMPLETED | OUTPATIENT
Start: 2023-09-11 | End: 2023-09-11

## 2023-09-11 RX ORDER — HYDROXYZINE HCL 10 MG
50 TABLET ORAL ONCE
Refills: 0 | Status: COMPLETED | OUTPATIENT
Start: 2023-09-11 | End: 2023-09-11

## 2023-09-11 RX ORDER — ACETAMINOPHEN 500 MG
650 TABLET ORAL ONCE
Refills: 0 | Status: COMPLETED | OUTPATIENT
Start: 2023-09-11 | End: 2023-09-11

## 2023-09-11 RX ORDER — SUCRALFATE 1 G
1 TABLET ORAL ONCE
Refills: 0 | Status: COMPLETED | OUTPATIENT
Start: 2023-09-11 | End: 2023-09-11

## 2023-09-11 RX ORDER — ESCITALOPRAM OXALATE 10 MG/1
20 TABLET, FILM COATED ORAL ONCE
Refills: 0 | Status: COMPLETED | OUTPATIENT
Start: 2023-09-11 | End: 2023-09-11

## 2023-09-11 RX ORDER — FAMOTIDINE 10 MG/ML
20 INJECTION INTRAVENOUS ONCE
Refills: 0 | Status: COMPLETED | OUTPATIENT
Start: 2023-09-11 | End: 2023-09-11

## 2023-09-11 RX ADMIN — Medication 50 MILLIGRAM(S): at 13:47

## 2023-09-11 RX ADMIN — Medication 30 MILLILITER(S): at 11:56

## 2023-09-11 RX ADMIN — Medication 1 GRAM(S): at 11:56

## 2023-09-11 RX ADMIN — ESCITALOPRAM OXALATE 20 MILLIGRAM(S): 10 TABLET, FILM COATED ORAL at 11:56

## 2023-09-11 RX ADMIN — Medication 650 MILLIGRAM(S): at 11:56

## 2023-09-11 RX ADMIN — FAMOTIDINE 20 MILLIGRAM(S): 10 INJECTION INTRAVENOUS at 11:56

## 2023-09-11 RX ADMIN — SODIUM CHLORIDE 1000 MILLILITER(S): 9 INJECTION, SOLUTION INTRAVENOUS at 13:48

## 2023-09-11 NOTE — ED PROVIDER NOTE - PHYSICAL EXAMINATION
_  Vital signs reviewed; ABCs intact  GENERAL: Well nourished, anxious appearing  SKIN: Warm, dry  HEAD & NECK: NCAT, supple neck  EYES: EOMI, PER B/L, no scleral icterus  ENT: MMM  CARD: RRR, S1, S2; no murmurs, no rubs, no gallops  RESP: Normal respiratory effort, CTAB, no rales, no wheezing  ABD: Soft, ND, NT, no rebound, no guarding; no CVAT  EXT: Pulses palpable distally; no edema; no calf tenderness; symmetrical calf circumferences  NEUROMSK: Grossly intact  PSYCH: Awake, alert, cooperative, appropriate

## 2023-09-11 NOTE — ED ADULT NURSE NOTE - DRUG PRE-SCREENING (DAST -1)
Dermatology referral  Referral for mammogram  Reschedule Medicare wellness appointment after lab appointment  Lab studies ordered Statement Selected

## 2023-09-11 NOTE — ED PROVIDER NOTE - ATTENDING CONTRIBUTION TO CARE
38 y.o. female, PMH of depression, anxiety, diabetes, GERD, colitis, presenting for evaluation after having had an anxiety attack earlier today. Patient states that she was very anxious, had a bout of hyperventilation, with associated sensation of difficulty breathing, which subsequently resolved when she calmed down. Unsure of trigger, but states this happens periodically. No SI, HI, or hallucinations. No further dyspnea. No chest pain. No fever, recent travel.  No vomiting or diarrhea. No urinary symptoms, including dysuria, frequency, urgency, hematuria.  No vaginal discharge. On exam, pt in NAD, AAOx3, head NC/AT, CN II-XII intact, PEERL, EOMi, neck (-) midline tenderness, lungs CTA B/L, CV S1S2 regular, abdomen soft/NT/ND/(+)BS, ext (-) edema, motor 5/5x4, sensation intact, ambulating with steady gait. Labs/UA/EKG reviewed. Pt continues to be asymptomatic. Will d/c.

## 2023-09-11 NOTE — ED PROVIDER NOTE - PROGRESS NOTE DETAILS
Resident HERMANN Garcia: Patient is feeling better. Labs unremarkable. Results reviewed and discussed with patient. All questions answered, patient expressed understanding, and agreed with outpatient follow up. Return precautions given.

## 2023-09-11 NOTE — ED PROVIDER NOTE - OBJECTIVE STATEMENT
Patient is a 38 year old woman, with a history of depression, anxiety, diabetes, GERD, colitis, presenting for evaluation after having had an anxiety attack earlier today. Patient states that she was very anxious, had a bout of hyperventilation, with associated sensation of difficulty breathing, which subsequently resolved when she calmed down. Unsure of trigger, but states this happens periodically. No SI, HI, or hallucinations. No further dyspnea. No chest pain. She also now reports that she has been having some abdominal cramping for the past week or so.  No fever, recent travel.  No vomiting or diarrhea.  No urinary symptoms, including dysuria, frequency, urgency, hematuria.  No vaginal discharge.

## 2023-09-11 NOTE — ED PROVIDER NOTE - NSFOLLOWUPINSTRUCTIONS_ED_ALL_ED_FT
You were evaluated in the Emergency Department today, and your visit did not reveal anything immediately life-threatening.    However, it is important that you follow-up with your PRIMARY CARE PHYSICIAN within 3-5 days.  If you do not have a primary care physician, please call your health insurance to select one.  If you do not have health insurance, please schedule an appointment with the [facility]: [phone], located at [address] [place] [address].    Additionally, it is important that you follow-up with   1) GASTROENTEROLOGY. Our Emergency Department Referral Coordinators will be reaching out to you in the next 24-48 hours (during business hours) from 9:00am to 5:00pm with a follow up appointment(s). Please expect a phone call from the hospital in that time frame. If you do not receive a call or if you have any questions or concerns, you can reach them at [phone].    2) PSYCHIATRY / THERAPY:  [facility] (walk-ins allowed)  [address] [place] [address]  [phone]    [facility]  [address] [place] [address]  Phone: [phone]    [facility]: [phone] ([phone])  At any hour of any day, in almost any language, from phone, tablet or computer, [facility] is your connection to get the help you need: peer support and short-term counseling via telephone, text and web.  ------------------------------------------------------------------------------------------------------------------------  Abdominal Pain    Many things can cause abdominal pain. Many times, abdominal pain is not caused by a disease and will improve without treatment. Your health care provider will do a physical exam to determine if there is a dangerous cause of your pain; blood tests and imaging may help determine the cause of your pain. However, in many cases, no cause may be found and you may need further testing as an outpatient. Monitor your abdominal pain for any changes.     SEEK IMMEDIATE MEDICAL CARE IF YOU HAVE ANY OF THE FOLLOWING SYMPTOMS: worsening abdominal pain, uncontrollable vomiting, profuse diarrhea, inability to have bowel movements or pass gas, black or bloody stools, fever accompanying chest pain or back pain, or fainting. These symptoms may represent a serious problem that is an emergency. Do not wait to see if the symptoms will go away. Get medical help right away. Call 911 and do not drive yourself to the hospital.  ------------------------------------------------------------------------------------------------------------------------  Sometimes after experiencing life stressors, you may have symptoms that include sadness, hopelessness, helplessness, restlessness, fatigue, irritability, and/or difficulty concentrating. This may interfere with life functions, including relationships, work, and school.    If these symptoms become persistent and disruptive to your everyday life, they may be due to depression, anxiety, or another mental illness. These illnesses can be addressed with medications and therapy.    Medications sometimes take a while to start working. Plus, it sometimes takes a few tries to find the right medication or combination of medication If you were started on a medication, make sure to take exactly as prescribed and follow up with a psychiatrist. In addition to medicine, psychotherapy (counseling) can help. This involves meeting with a therapist to talk about your feelings, thoughts, and life. There are different types of psychotherapy. In general, they all focus on helping you learn new ways of thinking and behaving, so you can better cope with your life stressors.    SEEK IMMEDIATE MEDICAL CARE IF YOU HAVE ANY OF THE FOLLOWING SYMPTOMS: thoughts about hurting killing yourself, thoughts about hurting or killing somebody else, hallucinations, or worsening depression.

## 2023-09-11 NOTE — ED PROVIDER NOTE - PATIENT PORTAL LINK FT
You can access the FollowMyHealth Patient Portal offered by [facility] by registering at the following website: [email or web address]. By joining [facility]’s FollowMyHealth portal, you will also be able to view your health information using other applications (apps) compatible with our system.

## 2023-09-11 NOTE — ED ADULT NURSE NOTE - SUICIDE SCREENING QUESTION 2
On and off in the past usually just aura and visual changes occ HA no current meds will try Nurtec prn No

## 2023-09-11 NOTE — ED ADULT TRIAGE NOTE - NS_BH TRG QUESTION2_ED_ALL_ED
Spoke with patient to convey below results. Patient verbalized understanding and had no questions at this time.    Impression negative for DVT in the left common femoral vein status post ablation. The greater saphenous vein has been Ablated with no flow seen beyond 5.2 cm.  
No

## 2023-09-12 LAB
CULTURE RESULTS: SIGNIFICANT CHANGE UP
SPECIMEN SOURCE: SIGNIFICANT CHANGE UP

## 2023-09-13 ENCOUNTER — APPOINTMENT (OUTPATIENT)
Dept: PSYCHIATRY | Facility: CLINIC | Age: 38
End: 2023-09-13

## 2023-09-13 ENCOUNTER — OUTPATIENT (OUTPATIENT)
Dept: OUTPATIENT SERVICES | Facility: HOSPITAL | Age: 38
LOS: 1 days | End: 2023-09-13
Payer: MEDICAID

## 2023-09-13 DIAGNOSIS — F32.A DEPRESSION, UNSPECIFIED: ICD-10-CM

## 2023-09-13 PROCEDURE — 90832 PSYTX W PT 30 MINUTES: CPT | Mod: 95

## 2023-09-14 DIAGNOSIS — F32.A DEPRESSION, UNSPECIFIED: ICD-10-CM

## 2023-09-15 ENCOUNTER — APPOINTMENT (OUTPATIENT)
Dept: NUTRITION | Facility: CLINIC | Age: 38
End: 2023-09-15

## 2023-09-17 ENCOUNTER — NON-APPOINTMENT (OUTPATIENT)
Age: 38
End: 2023-09-17

## 2023-09-26 ENCOUNTER — APPOINTMENT (OUTPATIENT)
Dept: PULMONOLOGY | Facility: CLINIC | Age: 38
End: 2023-09-26
Payer: MEDICAID

## 2023-09-26 VITALS
OXYGEN SATURATION: 99 % | HEIGHT: 64 IN | BODY MASS INDEX: 34.31 KG/M2 | SYSTOLIC BLOOD PRESSURE: 110 MMHG | WEIGHT: 201 LBS | HEART RATE: 94 BPM | DIASTOLIC BLOOD PRESSURE: 70 MMHG

## 2023-09-26 DIAGNOSIS — G47.33 OBSTRUCTIVE SLEEP APNEA (ADULT) (PEDIATRIC): ICD-10-CM

## 2023-09-26 DIAGNOSIS — R91.1 SOLITARY PULMONARY NODULE: ICD-10-CM

## 2023-09-26 PROCEDURE — 99214 OFFICE O/P EST MOD 30 MIN: CPT

## 2023-09-27 ENCOUNTER — OUTPATIENT (OUTPATIENT)
Dept: OUTPATIENT SERVICES | Facility: HOSPITAL | Age: 38
LOS: 1 days | End: 2023-09-27
Payer: MEDICAID

## 2023-09-27 ENCOUNTER — APPOINTMENT (OUTPATIENT)
Dept: INTERNAL MEDICINE | Facility: CLINIC | Age: 38
End: 2023-09-27
Payer: MEDICAID

## 2023-09-27 VITALS
OXYGEN SATURATION: 98 % | SYSTOLIC BLOOD PRESSURE: 131 MMHG | TEMPERATURE: 98.1 F | BODY MASS INDEX: 33.29 KG/M2 | DIASTOLIC BLOOD PRESSURE: 82 MMHG | HEIGHT: 64 IN | WEIGHT: 195 LBS | HEART RATE: 106 BPM

## 2023-09-27 DIAGNOSIS — Z00.00 ENCOUNTER FOR GENERAL ADULT MEDICAL EXAMINATION WITHOUT ABNORMAL FINDINGS: ICD-10-CM

## 2023-09-27 LAB
ALBUMIN SERPL ELPH-MCNC: 4.3 G/DL
ALP BLD-CCNC: 201 U/L
ALT SERPL-CCNC: 70 U/L
ANION GAP SERPL CALC-SCNC: 12 MMOL/L
AST SERPL-CCNC: 28 U/L
BILIRUB SERPL-MCNC: <0.2 MG/DL
BUN SERPL-MCNC: 10 MG/DL
CALCIUM SERPL-MCNC: 9.2 MG/DL
CHLORIDE SERPL-SCNC: 96 MMOL/L
CHOLEST SERPL-MCNC: 192 MG/DL
CO2 SERPL-SCNC: 23 MMOL/L
CREAT SERPL-MCNC: 0.6 MG/DL
EGFR: 118 ML/MIN/1.73M2
ESTIMATED AVERAGE GLUCOSE: 249 MG/DL
GLUCOSE SERPL-MCNC: 370 MG/DL
HBA1C MFR BLD HPLC: 10.3 %
HCT VFR BLD CALC: 45.8 %
HDLC SERPL-MCNC: 49 MG/DL
HGB BLD-MCNC: 14.7 G/DL
LDLC SERPL CALC-MCNC: 105 MG/DL
MCHC RBC-ENTMCNC: 29.6 PG
MCHC RBC-ENTMCNC: 32.1 G/DL
MCV RBC AUTO: 92.3 FL
NONHDLC SERPL-MCNC: 143 MG/DL
PLATELET # BLD AUTO: 368 K/UL
PMV BLD: 10 FL
POTASSIUM SERPL-SCNC: 4.9 MMOL/L
PROT SERPL-MCNC: 7 G/DL
RBC # BLD: 4.96 M/UL
RBC # FLD: 12.9 %
SODIUM SERPL-SCNC: 131 MMOL/L
TRIGL SERPL-MCNC: 191 MG/DL
TSH SERPL-ACNC: 0.83 UIU/ML
WBC # FLD AUTO: 10.65 K/UL

## 2023-09-27 PROCEDURE — 99214 OFFICE O/P EST MOD 30 MIN: CPT | Mod: GC

## 2023-09-27 PROCEDURE — 99214 OFFICE O/P EST MOD 30 MIN: CPT

## 2023-09-27 PROCEDURE — 36415 COLL VENOUS BLD VENIPUNCTURE: CPT

## 2023-09-27 PROCEDURE — 80061 LIPID PANEL: CPT

## 2023-09-27 PROCEDURE — 83036 HEMOGLOBIN GLYCOSYLATED A1C: CPT

## 2023-09-27 PROCEDURE — 85027 COMPLETE CBC AUTOMATED: CPT

## 2023-09-27 PROCEDURE — 80053 COMPREHEN METABOLIC PANEL: CPT

## 2023-09-27 PROCEDURE — 84443 ASSAY THYROID STIM HORMONE: CPT

## 2023-09-27 RX ORDER — FERROUS SULFATE TAB EC 325 MG (65 MG FE EQUIVALENT) 325 (65 FE) MG
325 (65 FE) TABLET DELAYED RESPONSE ORAL DAILY
Qty: 30 | Refills: 5 | Status: COMPLETED | COMMUNITY
Start: 2023-01-18 | End: 2023-09-27

## 2023-09-27 RX ORDER — LEVMETAMFETAMINE 50 MG
INHALER (EA) NASAL
Qty: 1 | Refills: 3 | Status: COMPLETED | COMMUNITY
Start: 2022-06-21 | End: 2023-09-27

## 2023-09-27 RX ORDER — OMEPRAZOLE 20 MG/1
20 TABLET, DELAYED RELEASE ORAL
Qty: 30 | Refills: 5 | Status: DISCONTINUED | COMMUNITY
Start: 2023-08-23 | End: 2023-09-27

## 2023-09-27 RX ORDER — OMEPRAZOLE MAGNESIUM 20 MG/1
20 TABLET, DELAYED RELEASE ORAL DAILY
Qty: 60 | Refills: 2 | Status: DISCONTINUED | COMMUNITY
Start: 2023-08-29 | End: 2023-09-27

## 2023-09-29 ENCOUNTER — APPOINTMENT (OUTPATIENT)
Dept: PSYCHIATRY | Facility: CLINIC | Age: 38
End: 2023-09-29

## 2023-09-29 DIAGNOSIS — Z00.00 ENCOUNTER FOR GENERAL ADULT MEDICAL EXAMINATION WITHOUT ABNORMAL FINDINGS: ICD-10-CM

## 2023-09-29 DIAGNOSIS — E11.9 TYPE 2 DIABETES MELLITUS WITHOUT COMPLICATIONS: ICD-10-CM

## 2023-10-01 ENCOUNTER — NON-APPOINTMENT (OUTPATIENT)
Age: 38
End: 2023-10-01

## 2023-10-01 PROBLEM — R10.2 PELVIC PAIN SYNDROME: Status: RESOLVED | Noted: 2018-09-14 | Resolved: 2023-10-01

## 2023-10-03 ENCOUNTER — OUTPATIENT (OUTPATIENT)
Dept: OUTPATIENT SERVICES | Facility: HOSPITAL | Age: 38
LOS: 1 days | End: 2023-10-03
Payer: MEDICAID

## 2023-10-03 ENCOUNTER — APPOINTMENT (OUTPATIENT)
Dept: PSYCHIATRY | Facility: CLINIC | Age: 38
End: 2023-10-03
Payer: MEDICAID

## 2023-10-03 DIAGNOSIS — F41.1 GENERALIZED ANXIETY DISORDER: ICD-10-CM

## 2023-10-03 DIAGNOSIS — F33.1 MAJOR DEPRESSIVE DISORDER, RECURRENT, MODERATE: ICD-10-CM

## 2023-10-03 PROCEDURE — ZZZZZ: CPT

## 2023-10-03 PROCEDURE — 99213 OFFICE O/P EST LOW 20 MIN: CPT

## 2023-10-04 ENCOUNTER — APPOINTMENT (OUTPATIENT)
Dept: GASTROENTEROLOGY | Facility: CLINIC | Age: 38
End: 2023-10-04
Payer: MEDICAID

## 2023-10-04 VITALS
SYSTOLIC BLOOD PRESSURE: 143 MMHG | DIASTOLIC BLOOD PRESSURE: 98 MMHG | WEIGHT: 197 LBS | BODY MASS INDEX: 33.63 KG/M2 | OXYGEN SATURATION: 99 % | HEIGHT: 64 IN | HEART RATE: 78 BPM

## 2023-10-04 DIAGNOSIS — Z00.00 ENCOUNTER FOR GENERAL ADULT MEDICAL EXAMINATION W/OUT ABNORMAL FINDINGS: ICD-10-CM

## 2023-10-04 DIAGNOSIS — F41.1 GENERALIZED ANXIETY DISORDER: ICD-10-CM

## 2023-10-04 PROCEDURE — 99214 OFFICE O/P EST MOD 30 MIN: CPT

## 2023-10-04 RX ORDER — LANCETS 28 GAUGE
EACH MISCELLANEOUS
Qty: 100 | Refills: 2 | Status: DISCONTINUED | COMMUNITY
Start: 2018-02-10 | End: 2023-10-04

## 2023-10-04 RX ORDER — OMEPRAZOLE MAGNESIUM 20 MG/1
20 TABLET, DELAYED RELEASE ORAL
Qty: 30 | Refills: 6 | Status: DISCONTINUED | COMMUNITY
Start: 2022-02-25 | End: 2023-10-04

## 2023-10-04 RX ORDER — BLOOD SUGAR DIAGNOSTIC
STRIP MISCELLANEOUS
Qty: 300 | Refills: 5 | Status: DISCONTINUED | COMMUNITY
Start: 2017-05-20 | End: 2023-10-04

## 2023-10-04 RX ORDER — SIMETHICONE 125 MG/1
125 TABLET, CHEWABLE ORAL
Qty: 90 | Refills: 3 | Status: DISCONTINUED | COMMUNITY
Start: 2023-02-13 | End: 2023-10-04

## 2023-10-04 RX ORDER — BLOOD SUGAR DIAGNOSTIC
STRIP MISCELLANEOUS
Qty: 3 | Refills: 5 | Status: DISCONTINUED | COMMUNITY
Start: 2022-01-12 | End: 2023-10-04

## 2023-10-06 ENCOUNTER — OUTPATIENT (OUTPATIENT)
Dept: OUTPATIENT SERVICES | Facility: HOSPITAL | Age: 38
LOS: 1 days | End: 2023-10-06
Payer: MEDICAID

## 2023-10-06 ENCOUNTER — APPOINTMENT (OUTPATIENT)
Dept: PSYCHIATRY | Facility: CLINIC | Age: 38
End: 2023-10-06
Payer: MEDICAID

## 2023-10-06 DIAGNOSIS — F41.0 PANIC DISORDER [EPISODIC PAROXYSMAL ANXIETY]: ICD-10-CM

## 2023-10-06 DIAGNOSIS — F41.9 ANXIETY DISORDER, UNSPECIFIED: ICD-10-CM

## 2023-10-06 DIAGNOSIS — F41.1 GENERALIZED ANXIETY DISORDER: ICD-10-CM

## 2023-10-06 DIAGNOSIS — F32.A DEPRESSION, UNSPECIFIED: ICD-10-CM

## 2023-10-06 PROCEDURE — ZZZZZ: CPT

## 2023-10-06 PROCEDURE — 90832 PSYTX W PT 30 MINUTES: CPT

## 2023-10-07 DIAGNOSIS — F41.0 PANIC DISORDER [EPISODIC PAROXYSMAL ANXIETY]: ICD-10-CM

## 2023-10-07 DIAGNOSIS — F41.1 GENERALIZED ANXIETY DISORDER: ICD-10-CM

## 2023-10-12 NOTE — ED PROVIDER NOTE - CROS ED CARDIOVAS ALL NEG
Chief Complaint   Patient presents with    Blood Draw     Labs drawn via PIV by RN in lab.  VS taken       Labs drawn from PIV placed by RN. Line flushed with saline. Vitals taken. Pt checked in for appointment(s).    Julia Lugo RN     - - -

## 2023-10-17 ENCOUNTER — APPOINTMENT (OUTPATIENT)
Dept: ENDOCRINOLOGY | Facility: CLINIC | Age: 38
End: 2023-10-17
Payer: MEDICAID

## 2023-10-17 VITALS
HEIGHT: 64 IN | BODY MASS INDEX: 33.63 KG/M2 | SYSTOLIC BLOOD PRESSURE: 128 MMHG | DIASTOLIC BLOOD PRESSURE: 88 MMHG | OXYGEN SATURATION: 98 % | WEIGHT: 197 LBS | HEART RATE: 80 BPM

## 2023-10-17 PROCEDURE — 99214 OFFICE O/P EST MOD 30 MIN: CPT

## 2023-10-17 RX ORDER — METOPROLOL SUCCINATE 25 MG/1
25 TABLET, EXTENDED RELEASE ORAL
Refills: 0 | Status: ACTIVE | COMMUNITY

## 2023-10-19 ENCOUNTER — NON-APPOINTMENT (OUTPATIENT)
Age: 38
End: 2023-10-19

## 2023-10-20 NOTE — ED ADULT NURSE NOTE - NS ED NURSE DISCH DISPOSITION
Quality 110: Preventive Care And Screening: Influenza Immunization: Influenza Immunization Administered during Influenza season
Quality 111:Pneumonia Vaccination Status For Older Adults: Pneumococcal Vaccination Previously Received
Detail Level: Detailed
Discharged
Quality 226: Preventive Care And Screening: Tobacco Use: Screening And Cessation Intervention: Patient screened for tobacco use and is an ex/non-smoker

## 2023-10-23 ENCOUNTER — APPOINTMENT (OUTPATIENT)
Dept: PSYCHIATRY | Facility: CLINIC | Age: 38
End: 2023-10-23

## 2023-10-24 ENCOUNTER — APPOINTMENT (OUTPATIENT)
Dept: PSYCHIATRY | Facility: CLINIC | Age: 38
End: 2023-10-24

## 2023-11-01 ENCOUNTER — EMERGENCY (EMERGENCY)
Facility: HOSPITAL | Age: 38
LOS: 0 days | Discharge: HOME | End: 2023-11-01
Attending: STUDENT IN AN ORGANIZED HEALTH CARE EDUCATION/TRAINING PROGRAM
Payer: MEDICAID

## 2023-11-01 VITALS
RESPIRATION RATE: 16 BRPM | HEART RATE: 98 BPM | TEMPERATURE: 98 F | HEIGHT: 64 IN | OXYGEN SATURATION: 98 % | WEIGHT: 197.09 LBS | SYSTOLIC BLOOD PRESSURE: 146 MMHG | DIASTOLIC BLOOD PRESSURE: 83 MMHG

## 2023-11-01 VITALS
RESPIRATION RATE: 18 BRPM | TEMPERATURE: 98 F | OXYGEN SATURATION: 99 % | DIASTOLIC BLOOD PRESSURE: 73 MMHG | SYSTOLIC BLOOD PRESSURE: 137 MMHG | HEART RATE: 103 BPM

## 2023-11-01 DIAGNOSIS — F31.9 BIPOLAR DISORDER, UNSPECIFIED: ICD-10-CM

## 2023-11-01 DIAGNOSIS — Z88.7 ALLERGY STATUS TO SERUM AND VACCINE: ICD-10-CM

## 2023-11-01 DIAGNOSIS — R53.81 OTHER MALAISE: ICD-10-CM

## 2023-11-01 DIAGNOSIS — R07.89 OTHER CHEST PAIN: ICD-10-CM

## 2023-11-01 DIAGNOSIS — Z88.0 ALLERGY STATUS TO PENICILLIN: ICD-10-CM

## 2023-11-01 DIAGNOSIS — R53.83 OTHER FATIGUE: ICD-10-CM

## 2023-11-01 DIAGNOSIS — Z88.8 ALLERGY STATUS TO OTHER DRUGS, MEDICAMENTS AND BIOLOGICAL SUBSTANCES: ICD-10-CM

## 2023-11-01 DIAGNOSIS — I10 ESSENTIAL (PRIMARY) HYPERTENSION: ICD-10-CM

## 2023-11-01 DIAGNOSIS — E11.9 TYPE 2 DIABETES MELLITUS WITHOUT COMPLICATIONS: ICD-10-CM

## 2023-11-01 DIAGNOSIS — R51.9 HEADACHE, UNSPECIFIED: ICD-10-CM

## 2023-11-01 DIAGNOSIS — R10.9 UNSPECIFIED ABDOMINAL PAIN: ICD-10-CM

## 2023-11-01 DIAGNOSIS — Z88.1 ALLERGY STATUS TO OTHER ANTIBIOTIC AGENTS STATUS: ICD-10-CM

## 2023-11-01 LAB
ALBUMIN SERPL ELPH-MCNC: 4.2 G/DL — SIGNIFICANT CHANGE UP (ref 3.5–5.2)
ALBUMIN SERPL ELPH-MCNC: 4.2 G/DL — SIGNIFICANT CHANGE UP (ref 3.5–5.2)
ALP SERPL-CCNC: 176 U/L — HIGH (ref 30–115)
ALP SERPL-CCNC: 176 U/L — HIGH (ref 30–115)
ALT FLD-CCNC: 82 U/L — HIGH (ref 0–41)
ALT FLD-CCNC: 82 U/L — HIGH (ref 0–41)
ANION GAP SERPL CALC-SCNC: 12 MMOL/L — SIGNIFICANT CHANGE UP (ref 7–14)
ANION GAP SERPL CALC-SCNC: 12 MMOL/L — SIGNIFICANT CHANGE UP (ref 7–14)
APPEARANCE UR: CLEAR — SIGNIFICANT CHANGE UP
APPEARANCE UR: CLEAR — SIGNIFICANT CHANGE UP
AST SERPL-CCNC: 32 U/L — SIGNIFICANT CHANGE UP (ref 0–41)
AST SERPL-CCNC: 32 U/L — SIGNIFICANT CHANGE UP (ref 0–41)
BASE EXCESS BLDV CALC-SCNC: 5.7 MMOL/L — HIGH (ref -2–3)
BASE EXCESS BLDV CALC-SCNC: 5.7 MMOL/L — HIGH (ref -2–3)
BASOPHILS # BLD AUTO: 0.08 K/UL — SIGNIFICANT CHANGE UP (ref 0–0.2)
BASOPHILS # BLD AUTO: 0.08 K/UL — SIGNIFICANT CHANGE UP (ref 0–0.2)
BASOPHILS NFR BLD AUTO: 0.5 % — SIGNIFICANT CHANGE UP (ref 0–1)
BASOPHILS NFR BLD AUTO: 0.5 % — SIGNIFICANT CHANGE UP (ref 0–1)
BILIRUB DIRECT SERPL-MCNC: <0.2 MG/DL — SIGNIFICANT CHANGE UP (ref 0–0.3)
BILIRUB DIRECT SERPL-MCNC: <0.2 MG/DL — SIGNIFICANT CHANGE UP (ref 0–0.3)
BILIRUB INDIRECT FLD-MCNC: >0.2 MG/DL — SIGNIFICANT CHANGE UP (ref 0.2–1.2)
BILIRUB INDIRECT FLD-MCNC: >0.2 MG/DL — SIGNIFICANT CHANGE UP (ref 0.2–1.2)
BILIRUB SERPL-MCNC: 0.4 MG/DL — SIGNIFICANT CHANGE UP (ref 0.2–1.2)
BILIRUB SERPL-MCNC: 0.4 MG/DL — SIGNIFICANT CHANGE UP (ref 0.2–1.2)
BILIRUB UR-MCNC: NEGATIVE — SIGNIFICANT CHANGE UP
BILIRUB UR-MCNC: NEGATIVE — SIGNIFICANT CHANGE UP
BUN SERPL-MCNC: 10 MG/DL — SIGNIFICANT CHANGE UP (ref 10–20)
BUN SERPL-MCNC: 10 MG/DL — SIGNIFICANT CHANGE UP (ref 10–20)
CA-I SERPL-SCNC: 1.23 MMOL/L — SIGNIFICANT CHANGE UP (ref 1.15–1.33)
CA-I SERPL-SCNC: 1.23 MMOL/L — SIGNIFICANT CHANGE UP (ref 1.15–1.33)
CALCIUM SERPL-MCNC: 9.8 MG/DL — SIGNIFICANT CHANGE UP (ref 8.4–10.5)
CALCIUM SERPL-MCNC: 9.8 MG/DL — SIGNIFICANT CHANGE UP (ref 8.4–10.5)
CHLORIDE SERPL-SCNC: 94 MMOL/L — LOW (ref 98–110)
CHLORIDE SERPL-SCNC: 94 MMOL/L — LOW (ref 98–110)
CO2 SERPL-SCNC: 27 MMOL/L — SIGNIFICANT CHANGE UP (ref 17–32)
CO2 SERPL-SCNC: 27 MMOL/L — SIGNIFICANT CHANGE UP (ref 17–32)
COLOR SPEC: YELLOW — SIGNIFICANT CHANGE UP
COLOR SPEC: YELLOW — SIGNIFICANT CHANGE UP
CREAT SERPL-MCNC: 0.6 MG/DL — LOW (ref 0.7–1.5)
CREAT SERPL-MCNC: 0.6 MG/DL — LOW (ref 0.7–1.5)
DIFF PNL FLD: NEGATIVE — SIGNIFICANT CHANGE UP
DIFF PNL FLD: NEGATIVE — SIGNIFICANT CHANGE UP
EGFR: 118 ML/MIN/1.73M2 — SIGNIFICANT CHANGE UP
EGFR: 118 ML/MIN/1.73M2 — SIGNIFICANT CHANGE UP
EOSINOPHIL # BLD AUTO: 0.02 K/UL — SIGNIFICANT CHANGE UP (ref 0–0.7)
EOSINOPHIL # BLD AUTO: 0.02 K/UL — SIGNIFICANT CHANGE UP (ref 0–0.7)
EOSINOPHIL NFR BLD AUTO: 0.1 % — SIGNIFICANT CHANGE UP (ref 0–8)
EOSINOPHIL NFR BLD AUTO: 0.1 % — SIGNIFICANT CHANGE UP (ref 0–8)
GAS PNL BLDV: 128 MMOL/L — LOW (ref 136–145)
GAS PNL BLDV: 128 MMOL/L — LOW (ref 136–145)
GAS PNL BLDV: SIGNIFICANT CHANGE UP
GAS PNL BLDV: SIGNIFICANT CHANGE UP
GLUCOSE SERPL-MCNC: 285 MG/DL — HIGH (ref 70–99)
GLUCOSE SERPL-MCNC: 285 MG/DL — HIGH (ref 70–99)
GLUCOSE UR QL: >=1000 MG/DL
GLUCOSE UR QL: >=1000 MG/DL
HCG SERPL QL: NEGATIVE — SIGNIFICANT CHANGE UP
HCG SERPL QL: NEGATIVE — SIGNIFICANT CHANGE UP
HCO3 BLDV-SCNC: 32 MMOL/L — HIGH (ref 22–29)
HCO3 BLDV-SCNC: 32 MMOL/L — HIGH (ref 22–29)
HCT VFR BLD CALC: 45.6 % — SIGNIFICANT CHANGE UP (ref 37–47)
HCT VFR BLD CALC: 45.6 % — SIGNIFICANT CHANGE UP (ref 37–47)
HCT VFR BLDA CALC: 40 % — SIGNIFICANT CHANGE UP (ref 39–51)
HCT VFR BLDA CALC: 40 % — SIGNIFICANT CHANGE UP (ref 39–51)
HGB BLD CALC-MCNC: 13.3 G/DL — SIGNIFICANT CHANGE UP (ref 12.6–17.4)
HGB BLD CALC-MCNC: 13.3 G/DL — SIGNIFICANT CHANGE UP (ref 12.6–17.4)
HGB BLD-MCNC: 14.9 G/DL — SIGNIFICANT CHANGE UP (ref 12–16)
HGB BLD-MCNC: 14.9 G/DL — SIGNIFICANT CHANGE UP (ref 12–16)
IMM GRANULOCYTES NFR BLD AUTO: 0.7 % — HIGH (ref 0.1–0.3)
IMM GRANULOCYTES NFR BLD AUTO: 0.7 % — HIGH (ref 0.1–0.3)
KETONES UR-MCNC: NEGATIVE MG/DL — SIGNIFICANT CHANGE UP
KETONES UR-MCNC: NEGATIVE MG/DL — SIGNIFICANT CHANGE UP
LACTATE BLDV-MCNC: 1.9 MMOL/L — SIGNIFICANT CHANGE UP (ref 0.5–2)
LACTATE BLDV-MCNC: 1.9 MMOL/L — SIGNIFICANT CHANGE UP (ref 0.5–2)
LACTATE SERPL-SCNC: 2.1 MMOL/L — HIGH (ref 0.7–2)
LACTATE SERPL-SCNC: 2.1 MMOL/L — HIGH (ref 0.7–2)
LEUKOCYTE ESTERASE UR-ACNC: NEGATIVE — SIGNIFICANT CHANGE UP
LEUKOCYTE ESTERASE UR-ACNC: NEGATIVE — SIGNIFICANT CHANGE UP
LIDOCAIN IGE QN: 11 U/L — SIGNIFICANT CHANGE UP (ref 7–60)
LIDOCAIN IGE QN: 11 U/L — SIGNIFICANT CHANGE UP (ref 7–60)
LYMPHOCYTES # BLD AUTO: 1.89 K/UL — SIGNIFICANT CHANGE UP (ref 1.2–3.4)
LYMPHOCYTES # BLD AUTO: 1.89 K/UL — SIGNIFICANT CHANGE UP (ref 1.2–3.4)
LYMPHOCYTES # BLD AUTO: 12.4 % — LOW (ref 20.5–51.1)
LYMPHOCYTES # BLD AUTO: 12.4 % — LOW (ref 20.5–51.1)
MCHC RBC-ENTMCNC: 30 PG — SIGNIFICANT CHANGE UP (ref 27–31)
MCHC RBC-ENTMCNC: 30 PG — SIGNIFICANT CHANGE UP (ref 27–31)
MCHC RBC-ENTMCNC: 32.7 G/DL — SIGNIFICANT CHANGE UP (ref 32–37)
MCHC RBC-ENTMCNC: 32.7 G/DL — SIGNIFICANT CHANGE UP (ref 32–37)
MCV RBC AUTO: 91.9 FL — SIGNIFICANT CHANGE UP (ref 81–99)
MCV RBC AUTO: 91.9 FL — SIGNIFICANT CHANGE UP (ref 81–99)
MONOCYTES # BLD AUTO: 0.78 K/UL — HIGH (ref 0.1–0.6)
MONOCYTES # BLD AUTO: 0.78 K/UL — HIGH (ref 0.1–0.6)
MONOCYTES NFR BLD AUTO: 5.1 % — SIGNIFICANT CHANGE UP (ref 1.7–9.3)
MONOCYTES NFR BLD AUTO: 5.1 % — SIGNIFICANT CHANGE UP (ref 1.7–9.3)
NEUTROPHILS # BLD AUTO: 12.41 K/UL — HIGH (ref 1.4–6.5)
NEUTROPHILS # BLD AUTO: 12.41 K/UL — HIGH (ref 1.4–6.5)
NEUTROPHILS NFR BLD AUTO: 81.2 % — HIGH (ref 42.2–75.2)
NEUTROPHILS NFR BLD AUTO: 81.2 % — HIGH (ref 42.2–75.2)
NITRITE UR-MCNC: NEGATIVE — SIGNIFICANT CHANGE UP
NITRITE UR-MCNC: NEGATIVE — SIGNIFICANT CHANGE UP
NRBC # BLD: 0 /100 WBCS — SIGNIFICANT CHANGE UP (ref 0–0)
NRBC # BLD: 0 /100 WBCS — SIGNIFICANT CHANGE UP (ref 0–0)
PCO2 BLDV: 53 MMHG — HIGH (ref 39–42)
PCO2 BLDV: 53 MMHG — HIGH (ref 39–42)
PH BLDV: 7.39 — SIGNIFICANT CHANGE UP (ref 7.32–7.43)
PH BLDV: 7.39 — SIGNIFICANT CHANGE UP (ref 7.32–7.43)
PH UR: 5.5 — SIGNIFICANT CHANGE UP (ref 5–8)
PH UR: 5.5 — SIGNIFICANT CHANGE UP (ref 5–8)
PLATELET # BLD AUTO: 413 K/UL — HIGH (ref 130–400)
PLATELET # BLD AUTO: 413 K/UL — HIGH (ref 130–400)
PMV BLD: 10 FL — SIGNIFICANT CHANGE UP (ref 7.4–10.4)
PMV BLD: 10 FL — SIGNIFICANT CHANGE UP (ref 7.4–10.4)
PO2 BLDV: 42 MMHG — SIGNIFICANT CHANGE UP
PO2 BLDV: 42 MMHG — SIGNIFICANT CHANGE UP
POTASSIUM BLDV-SCNC: 5.1 MMOL/L — SIGNIFICANT CHANGE UP (ref 3.5–5.1)
POTASSIUM BLDV-SCNC: 5.1 MMOL/L — SIGNIFICANT CHANGE UP (ref 3.5–5.1)
POTASSIUM SERPL-MCNC: 4.5 MMOL/L — SIGNIFICANT CHANGE UP (ref 3.5–5)
POTASSIUM SERPL-MCNC: 4.5 MMOL/L — SIGNIFICANT CHANGE UP (ref 3.5–5)
POTASSIUM SERPL-SCNC: 4.5 MMOL/L — SIGNIFICANT CHANGE UP (ref 3.5–5)
POTASSIUM SERPL-SCNC: 4.5 MMOL/L — SIGNIFICANT CHANGE UP (ref 3.5–5)
PROT SERPL-MCNC: 7.5 G/DL — SIGNIFICANT CHANGE UP (ref 6–8)
PROT SERPL-MCNC: 7.5 G/DL — SIGNIFICANT CHANGE UP (ref 6–8)
PROT UR-MCNC: NEGATIVE MG/DL — SIGNIFICANT CHANGE UP
PROT UR-MCNC: NEGATIVE MG/DL — SIGNIFICANT CHANGE UP
RBC # BLD: 4.96 M/UL — SIGNIFICANT CHANGE UP (ref 4.2–5.4)
RBC # BLD: 4.96 M/UL — SIGNIFICANT CHANGE UP (ref 4.2–5.4)
RBC # FLD: 12.7 % — SIGNIFICANT CHANGE UP (ref 11.5–14.5)
RBC # FLD: 12.7 % — SIGNIFICANT CHANGE UP (ref 11.5–14.5)
SAO2 % BLDV: 70 % — SIGNIFICANT CHANGE UP
SAO2 % BLDV: 70 % — SIGNIFICANT CHANGE UP
SODIUM SERPL-SCNC: 133 MMOL/L — LOW (ref 135–146)
SODIUM SERPL-SCNC: 133 MMOL/L — LOW (ref 135–146)
SP GR SPEC: >1.03 — HIGH (ref 1–1.03)
SP GR SPEC: >1.03 — HIGH (ref 1–1.03)
TROPONIN T SERPL-MCNC: <0.01 NG/ML — SIGNIFICANT CHANGE UP
TROPONIN T SERPL-MCNC: <0.01 NG/ML — SIGNIFICANT CHANGE UP
UROBILINOGEN FLD QL: 0.2 MG/DL — SIGNIFICANT CHANGE UP (ref 0.2–1)
UROBILINOGEN FLD QL: 0.2 MG/DL — SIGNIFICANT CHANGE UP (ref 0.2–1)
WBC # BLD: 15.29 K/UL — HIGH (ref 4.8–10.8)
WBC # BLD: 15.29 K/UL — HIGH (ref 4.8–10.8)
WBC # FLD AUTO: 15.29 K/UL — HIGH (ref 4.8–10.8)
WBC # FLD AUTO: 15.29 K/UL — HIGH (ref 4.8–10.8)

## 2023-11-01 PROCEDURE — 99285 EMERGENCY DEPT VISIT HI MDM: CPT

## 2023-11-01 PROCEDURE — 99285 EMERGENCY DEPT VISIT HI MDM: CPT | Mod: 25

## 2023-11-01 PROCEDURE — 71045 X-RAY EXAM CHEST 1 VIEW: CPT | Mod: 26

## 2023-11-01 PROCEDURE — 82962 GLUCOSE BLOOD TEST: CPT

## 2023-11-01 PROCEDURE — 71045 X-RAY EXAM CHEST 1 VIEW: CPT

## 2023-11-01 PROCEDURE — 87086 URINE CULTURE/COLONY COUNT: CPT

## 2023-11-01 PROCEDURE — G1004: CPT

## 2023-11-01 PROCEDURE — 84132 ASSAY OF SERUM POTASSIUM: CPT

## 2023-11-01 PROCEDURE — 93010 ELECTROCARDIOGRAM REPORT: CPT

## 2023-11-01 PROCEDURE — 76856 US EXAM PELVIC COMPLETE: CPT | Mod: 26

## 2023-11-01 PROCEDURE — 83605 ASSAY OF LACTIC ACID: CPT

## 2023-11-01 PROCEDURE — 70450 CT HEAD/BRAIN W/O DYE: CPT | Mod: MG

## 2023-11-01 PROCEDURE — 85025 COMPLETE CBC W/AUTO DIFF WBC: CPT

## 2023-11-01 PROCEDURE — 76856 US EXAM PELVIC COMPLETE: CPT

## 2023-11-01 PROCEDURE — 81003 URINALYSIS AUTO W/O SCOPE: CPT

## 2023-11-01 PROCEDURE — 84295 ASSAY OF SERUM SODIUM: CPT

## 2023-11-01 PROCEDURE — 80076 HEPATIC FUNCTION PANEL: CPT

## 2023-11-01 PROCEDURE — 82330 ASSAY OF CALCIUM: CPT

## 2023-11-01 PROCEDURE — 80048 BASIC METABOLIC PNL TOTAL CA: CPT

## 2023-11-01 PROCEDURE — 96374 THER/PROPH/DIAG INJ IV PUSH: CPT | Mod: XU

## 2023-11-01 PROCEDURE — 93005 ELECTROCARDIOGRAM TRACING: CPT

## 2023-11-01 PROCEDURE — 74177 CT ABD & PELVIS W/CONTRAST: CPT | Mod: MG

## 2023-11-01 PROCEDURE — 70450 CT HEAD/BRAIN W/O DYE: CPT | Mod: 26,MG

## 2023-11-01 PROCEDURE — 36415 COLL VENOUS BLD VENIPUNCTURE: CPT

## 2023-11-01 PROCEDURE — 96375 TX/PRO/DX INJ NEW DRUG ADDON: CPT

## 2023-11-01 PROCEDURE — 84484 ASSAY OF TROPONIN QUANT: CPT

## 2023-11-01 PROCEDURE — 84703 CHORIONIC GONADOTROPIN ASSAY: CPT

## 2023-11-01 PROCEDURE — 85014 HEMATOCRIT: CPT

## 2023-11-01 PROCEDURE — 74177 CT ABD & PELVIS W/CONTRAST: CPT | Mod: 26,MG

## 2023-11-01 PROCEDURE — 83690 ASSAY OF LIPASE: CPT

## 2023-11-01 PROCEDURE — 85018 HEMOGLOBIN: CPT

## 2023-11-01 PROCEDURE — 82803 BLOOD GASES ANY COMBINATION: CPT

## 2023-11-01 RX ORDER — ACETAMINOPHEN 500 MG
975 TABLET ORAL ONCE
Refills: 0 | Status: COMPLETED | OUTPATIENT
Start: 2023-11-01 | End: 2023-11-01

## 2023-11-01 RX ORDER — SODIUM CHLORIDE 9 MG/ML
1000 INJECTION INTRAMUSCULAR; INTRAVENOUS; SUBCUTANEOUS ONCE
Refills: 0 | Status: COMPLETED | OUTPATIENT
Start: 2023-11-01 | End: 2023-11-01

## 2023-11-01 RX ORDER — METOCLOPRAMIDE HCL 10 MG
10 TABLET ORAL ONCE
Refills: 0 | Status: COMPLETED | OUTPATIENT
Start: 2023-11-01 | End: 2023-11-01

## 2023-11-01 RX ORDER — FAMOTIDINE 10 MG/ML
20 INJECTION INTRAVENOUS ONCE
Refills: 0 | Status: COMPLETED | OUTPATIENT
Start: 2023-11-01 | End: 2023-11-01

## 2023-11-01 RX ORDER — KETOROLAC TROMETHAMINE 30 MG/ML
30 SYRINGE (ML) INJECTION ONCE
Refills: 0 | Status: DISCONTINUED | OUTPATIENT
Start: 2023-11-01 | End: 2023-11-01

## 2023-11-01 RX ADMIN — FAMOTIDINE 20 MILLIGRAM(S): 10 INJECTION INTRAVENOUS at 14:53

## 2023-11-01 RX ADMIN — SODIUM CHLORIDE 1000 MILLILITER(S): 9 INJECTION INTRAMUSCULAR; INTRAVENOUS; SUBCUTANEOUS at 14:53

## 2023-11-01 RX ADMIN — Medication 975 MILLIGRAM(S): at 11:44

## 2023-11-01 RX ADMIN — Medication 10 MILLIGRAM(S): at 15:00

## 2023-11-01 RX ADMIN — Medication 30 MILLIGRAM(S): at 18:27

## 2023-11-01 NOTE — ED ADULT TRIAGE NOTE - CHIEF COMPLAINT QUOTE
Pt bibems for abdominal pain and chest pain x 1 week. Has LUQ tenderness and cramping. Describes chest pain as midsternal "crushing", radiates to b/l flanks. Also has headache, rash and dizziness

## 2023-11-01 NOTE — ED PROVIDER NOTE - ATTENDING APP SHARED VISIT CONTRIBUTION OF CARE
I personally evaluated the patient. I reviewed the Resident´s or Physician Assistant´s note (as assigned above), and agree with the findings and plan except as documented in my note.    38-year-old female known diabetic presents with mother for evaluation of abdominal pain, general malaise, headache and excessive fatigue.  [facility], mother states, "she is tired all the time, she always has headaches.  "Patient is a limited historian bedside.    The review of systems otherwise unremarkable    GENERAL: female in no distress.   HEENT: EOMI   CHEST: normal work of breathing noted  CV: pulses intact  Abdomen: Soft, bilateral upper quadrant tenderness, no rebound no guarding  EXTR: FROM   NEURO: AAO 3 no focal deficits  SKIN: normal no pallor       Impression: Abdominal pain, headache    Plan: IV labs imaging supportive care and reevaluation

## 2023-11-01 NOTE — ED PROVIDER NOTE - OBJECTIVE STATEMENT
Pt is a 37y/o fermale with a pmhx of HTN, Bipolar dz here for genearalized malaise, HA, Fatigue, chest discomfort and abd pain x 1 week. Pt denies fever, chills, n/v/d, SOB, Cough

## 2023-11-01 NOTE — ED PROVIDER NOTE - CLINICAL SUMMARY MEDICAL DECISION MAKING FREE TEXT BOX
Labs CT ultrasound reviewed pt feeling improved, tolerating PO, abdomen soft, non-tender, non-distended, no rebound, no guarding.   Imaging was ordered and reviewed by me.  Appropriate medications for patient's presenting complaints were ordered and effects were reassessed.  Patient's records (prior hospital, ED visit, and/or nursing home notes if available) were reviewed.  Additional history was obtained from EMS, family, and/or PCP (where available).  Escalation to admission/observation was considered.  However patient feels much better and is comfortable with discharge.  Appropriate follow-up was arranged. Results reviewed and discussed with pt and printed for patient. Anticipatory guidance given including close outpatient followup. Strict return precautions given. Pt verbalizes understanding of and agrees with plan.

## 2023-11-01 NOTE — ED ADULT NURSE NOTE - NSFALLRISKINTERV_ED_ALL_ED

## 2023-11-01 NOTE — ED ADULT NURSE NOTE - OBJECTIVE STATEMENT
Patient reports abdominal pain and tenderness for the past week.  Also reports that she has been falling asleep frequently and unexpectedly, and feels fatigued.  Also has headache and chest discomfort today.

## 2023-11-01 NOTE — ED PROVIDER NOTE - NSPTACCESSSVCSAPPTDETAILS_ED_ALL_ED_FT
abd pain x 1 week, Pelvic US and ct scan abdomen and pelvis are normal, has appt with GI with Dr. Brooks in January, hoping for sooner apt

## 2023-11-01 NOTE — ED PROVIDER NOTE - PATIENT PORTAL LINK FT
You can access the FollowMyHealth Patient Portal offered by [facility] by registering at the following website: [email or web address]. By joining [employer]’s FollowMyHealth portal, you will also be able to view your health information using other applications (apps) compatible with our system.

## 2023-11-01 NOTE — ED PROVIDER NOTE - PROGRESS NOTE DETAILS
Dr. Concepcion - Imaging reveals concern for adnexal fullness, additional history taken bedside patient was last menstrual period was 2 weeks ago, concern for mittelschmerz versus ovarian pathology, will get ultrasound and reevaluate.

## 2023-11-01 NOTE — ED PROVIDER NOTE - NSFOLLOWUPINSTRUCTIONS_ED_ALL_ED_FT
Our Emergency Department Referral Coordinators will be reaching out to you in the next 24-48 hours from 9:00am to 5:00pm with a follow up appointment. Please expect a phone call from the hospital in that time frame. If you do not receive a call or if you have any questions or concerns, you can reach them at   [phone]

## 2023-11-02 ENCOUNTER — APPOINTMENT (OUTPATIENT)
Dept: SLEEP CENTER | Facility: HOSPITAL | Age: 38
End: 2023-11-02

## 2023-11-02 LAB
CULTURE RESULTS: SIGNIFICANT CHANGE UP
CULTURE RESULTS: SIGNIFICANT CHANGE UP
SPECIMEN SOURCE: SIGNIFICANT CHANGE UP
SPECIMEN SOURCE: SIGNIFICANT CHANGE UP

## 2023-11-06 ENCOUNTER — NON-APPOINTMENT (OUTPATIENT)
Age: 38
End: 2023-11-06

## 2023-11-06 ENCOUNTER — APPOINTMENT (OUTPATIENT)
Dept: PSYCHIATRY | Facility: CLINIC | Age: 38
End: 2023-11-06

## 2023-11-10 ENCOUNTER — APPOINTMENT (OUTPATIENT)
Dept: PSYCHIATRY | Facility: CLINIC | Age: 38
End: 2023-11-10

## 2023-11-10 ENCOUNTER — RX RENEWAL (OUTPATIENT)
Age: 38
End: 2023-11-10

## 2023-11-10 ENCOUNTER — OUTPATIENT (OUTPATIENT)
Dept: OUTPATIENT SERVICES | Facility: HOSPITAL | Age: 38
LOS: 1 days | End: 2023-11-10
Payer: MEDICAID

## 2023-11-10 DIAGNOSIS — F41.1 GENERALIZED ANXIETY DISORDER: ICD-10-CM

## 2023-11-10 DIAGNOSIS — F41.0 PANIC DISORDER [EPISODIC PAROXYSMAL ANXIETY]: ICD-10-CM

## 2023-11-10 PROCEDURE — 90832 PSYTX W PT 30 MINUTES: CPT

## 2023-11-11 DIAGNOSIS — F41.0 PANIC DISORDER [EPISODIC PAROXYSMAL ANXIETY]: ICD-10-CM

## 2023-11-11 DIAGNOSIS — F41.1 GENERALIZED ANXIETY DISORDER: ICD-10-CM

## 2023-11-13 ENCOUNTER — TRANSCRIPTION ENCOUNTER (OUTPATIENT)
Age: 38
End: 2023-11-13

## 2023-11-13 ENCOUNTER — OUTPATIENT (OUTPATIENT)
Dept: OUTPATIENT SERVICES | Facility: HOSPITAL | Age: 38
LOS: 1 days | End: 2023-11-13
Payer: MEDICAID

## 2023-11-13 ENCOUNTER — APPOINTMENT (OUTPATIENT)
Dept: PODIATRY | Facility: CLINIC | Age: 38
End: 2023-11-13
Payer: MEDICAID
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
## 2023-11-13 DIAGNOSIS — E11.8 TYPE 2 DIABETES MELLITUS WITH UNSPECIFIED COMPLICATIONS: ICD-10-CM

## 2023-11-13 DIAGNOSIS — G62.9 POLYNEUROPATHY, UNSPECIFIED: ICD-10-CM

## 2023-11-13 DIAGNOSIS — Z00.00 ENCOUNTER FOR GENERAL ADULT MEDICAL EXAMINATION WITHOUT ABNORMAL FINDINGS: ICD-10-CM
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
## 2023-11-13 PROCEDURE — 99213 OFFICE O/P EST LOW 20 MIN: CPT

## 2023-11-16 ENCOUNTER — NON-APPOINTMENT (OUTPATIENT)
Age: 38
End: 2023-11-16

## 2023-11-16 NOTE — DIETITIAN INITIAL EVALUATION ADULT. - DIET TYPE
Received request via: Pharmacy    Was the patient seen in the last year in this department? Yes    Does the patient have an active prescription (recently filled or refills available) for medication(s) requested? No      
lactose restricted/fiber/residue restricted/consistent carbohydrate (evening snack)

## 2023-11-19 ENCOUNTER — EMERGENCY (EMERGENCY)
Facility: HOSPITAL | Age: 38
LOS: 0 days | Discharge: HOME | End: 2023-11-19
Attending: EMERGENCY MEDICINE
Payer: MEDICAID

## 2023-11-19 VITALS
HEART RATE: 98 BPM | HEIGHT: 64 IN | SYSTOLIC BLOOD PRESSURE: 140 MMHG | OXYGEN SATURATION: 99 % | DIASTOLIC BLOOD PRESSURE: 61 MMHG | TEMPERATURE: 98 F | RESPIRATION RATE: 18 BRPM | WEIGHT: 190.04 LBS

## 2023-11-19 DIAGNOSIS — J06.9 ACUTE UPPER RESPIRATORY INFECTION, UNSPECIFIED: ICD-10-CM

## 2023-11-19 DIAGNOSIS — Z88.0 ALLERGY STATUS TO PENICILLIN: ICD-10-CM

## 2023-11-19 DIAGNOSIS — F31.9 BIPOLAR DISORDER, UNSPECIFIED: ICD-10-CM

## 2023-11-19 DIAGNOSIS — I10 ESSENTIAL (PRIMARY) HYPERTENSION: ICD-10-CM

## 2023-11-19 DIAGNOSIS — Z87.39 PERSONAL HISTORY OF OTHER DISEASES OF THE MUSCULOSKELETAL SYSTEM AND CONNECTIVE TISSUE: ICD-10-CM

## 2023-11-19 DIAGNOSIS — Z88.7 ALLERGY STATUS TO SERUM AND VACCINE: ICD-10-CM

## 2023-11-19 DIAGNOSIS — Z87.19 PERSONAL HISTORY OF OTHER DISEASES OF THE DIGESTIVE SYSTEM: ICD-10-CM

## 2023-11-19 DIAGNOSIS — R05.9 COUGH, UNSPECIFIED: ICD-10-CM

## 2023-11-19 DIAGNOSIS — Z86.69 PERSONAL HISTORY OF OTHER DISEASES OF THE NERVOUS SYSTEM AND SENSE ORGANS: ICD-10-CM

## 2023-11-19 DIAGNOSIS — E11.9 TYPE 2 DIABETES MELLITUS WITHOUT COMPLICATIONS: ICD-10-CM

## 2023-11-19 DIAGNOSIS — H92.02 OTALGIA, LEFT EAR: ICD-10-CM

## 2023-11-19 DIAGNOSIS — Z88.1 ALLERGY STATUS TO OTHER ANTIBIOTIC AGENTS STATUS: ICD-10-CM

## 2023-11-19 DIAGNOSIS — H60.502 UNSPECIFIED ACUTE NONINFECTIVE OTITIS EXTERNA, LEFT EAR: ICD-10-CM

## 2023-11-19 PROCEDURE — 99283 EMERGENCY DEPT VISIT LOW MDM: CPT

## 2023-11-19 PROCEDURE — 99284 EMERGENCY DEPT VISIT MOD MDM: CPT

## 2023-11-19 RX ORDER — CIPROFLOXACIN 6 MG/ML
1 SUSPENSION INTRATYMPANIC
Qty: 1 | Refills: 1
Start: 2023-11-19 | End: 2023-12-02

## 2023-11-19 NOTE — ED ADULT NURSE NOTE - TEMPLATE
Pt is a 74 yo female found down at home, was admitted for DKA and R diabetic foot wound, s/p operative debridement by podiatry 3/14/21 and R foot guillotine amputation and L foot I&D by vascular surgery 3/15/21., s/p R BKA closure on 3/19/2021.
EENMT

## 2023-11-19 NOTE — ED PROVIDER NOTE - ATTENDING CONTRIBUTION TO CARE
38-year-old female from [facility] residence PMH GERD, depression, neuropathy, spinal stenosis, tachycardia, diabetes, bipolar disorder presenting with left-sided ear pain times few days.  Accompanied by cough and posttussive chest tightness.  No other symptoms.  Denies F/C, rhinorrhea, sore throat, nausea vomiting, abdominal pain.    PE:  nad  skin warm, dry  ncat  perrl/eomi  L tragal ttp, eac mild cerumen/debris, no edema, TM nml; R eac/tm clear, op clear pharynx nml  neck supple no lad  rrr nl s1s2 no mrg  nml wob, good air entry bl, ctab no wrr  abd soft ntnd no palpable masses no rgr  back non-tender no cvat  ext no cce dpi  neuro aaox3 grossly nf exam

## 2023-11-19 NOTE — ED PROVIDER NOTE - PHYSICAL EXAMINATION
CONSTITUTIONAL: awake, sitting in stretcher in no acute distress  SKIN: Warm, dry  HEAD: Normocephalic; atraumatic  EYES: No conjunctival injection. EOMI. PERRL  ENT: No nasal discharge; oropharynx nonerythematous; airway clear; erythema b/l, no exudate, visualized TM b/l   NECK: Supple; non tender, no lymphadenopathy  CARD:  Regular rate and rhythm; S1, S2 normal; no murmurs, gallops, or rubs  RESP: CTAB; No wheezes, crackles, rales or rhonchi  ABD: Soft, nontender, nondistended, nonrigid, no guarding or rebound tenderness  NEURO: A&O x3, speaking in full clear sentences, no facial asymmetry, moving all extremities

## 2023-11-19 NOTE — ED PROVIDER NOTE - IV ALTEPLASE DOOR HIDDEN
"Kimberley is a 41 year old female 2 weeks S/P diagnostic laparoscopy and cautery of endometriosis, complicated by no problems reported.  She is currently requiring nothing for pain management.  The pathology report showed none tkaen. She reports the pain from surgery has improved.  She now wishes to understand next steps forward with endometrioiss.    Exam: /77 (BP Location: Right arm, Patient Position: Chair, Cuff Size: Adult Regular)   Pulse 81   Temp 97.9  F (36.6  C) (Tympanic)   Resp 16   Ht 1.626 m (5' 4\")   Wt 61.7 kg (136 lb 1.6 oz)   LMP 09/27/2021 (Approximate)   BMI 23.36 kg/m     incisions: intact, no erythema, induration or discharge    Assessment:  Postop  Endometriosis of pelvis    Plan:The nature of endometriosis was discussed with the patient, including potential treatment options,   Medical vs surgical options  1)use of [facility] agonist/antagonist therapy such as Orilissa, up to 2 years  2)continuous BCP (pt had transaminitis with BCP in the past so not advisable  3)conservative surgery: excision of endometrial implants  4)hysterectomy with or without removal of the ovaries (I advised removal of the ovaries for the most definitive surgery, although this would effect permanent menopause and likely need ERT postoperatively)  Removal of the uterus alone, is likely to only temporize her pain (although would relieve her bleeding)  I would not advised endometrial ablation due to likelihood of myometrial implants (adenomyosis) given her heavy periods.    She will consider these options.  Tasha Dewey MD  [facility]    An additional 25 minutes was spent discussing the endometriosis and treatment options, beyond the postop check.    Pt sent [employer] message indicating she has considered all her options and wishes to proceed with LAPAROSCOPIC ASSISTED VAGINAL HYSTERECTOMY (preserve ovaries)  Tasha Dewey MD  [facility]    " show

## 2023-11-19 NOTE — ED PROVIDER NOTE - NSFOLLOWUPINSTRUCTIONS_ED_ALL_ED_FT
Otitis Externa    Otitis externa is a bacterial or fungal infection of the outer ear canal. This is the area from the eardrum to the outside of the ear. Otitis externa is sometimes called "swimmer's ear." Causes include swimming in dirty water, moisture remaining in ear after swimming or bathing, trauma to the ear, objects stuck in the ear, cuts or scrapes in our outside of the ear. Symptoms include itching, pain, swelling, redness in the ear canal, and fluid coming from the ear. To prevent recurrence of otitis media keep your ear dry, avoid scratching or putting objects inside your ear including cotton swabs, and avoid swimming in polluted water or poorly chlorinated pools.    SEEK MEDICAL CARE IF YOU HAVE THE FOLLOWING SYMPTOMS: fever, worsening symptoms, headache, redness/swelling/pain over the bone behind your ear.    Upper Respiratory Infection, Adult    Most upper respiratory infections (URIs) are a viral infection of the air passages leading to the lungs. A URI affects the nose, throat, and upper air passages. The most common type of URI is nasopharyngitis and is typically referred to as "the common cold."    URIs run their course and usually go away on their own. Most of the time, a URI does not require medical attention, but sometimes a bacterial infection in the upper airways can follow a viral infection. This is called a secondary infection. Sinus and middle ear infections are common types of secondary upper respiratory infections.    Bacterial pneumonia can also complicate a URI. A URI can worsen asthma and chronic obstructive pulmonary disease (COPD). Sometimes, these complications can require emergency medical care and may be life threatening.     CAUSES  Almost all URIs are caused by viruses. A virus is a type of germ and can spread from one person to another.     RISKS FACTORS  You may be at risk for a URI if:     You smoke.    You have chronic heart or lung disease.   You have a weakened defense (immune) system.    You are very young or very old.    You have nasal allergies or asthma.  You work in crowded or poorly ventilated areas.  You work in health care facilities or schools.    SIGNS AND SYMPTOMS  Symptoms typically develop 2–3 days after you come in contact with a cold virus. Most viral URIs last 7–10 days. However, viral URIs from the influenza virus (flu virus) can last 14–18 days and are typically more severe. Symptoms may include:     Runny or stuffy (congested) nose.    Sneezing.    Cough.    Sore throat.    Headache.    Fatigue.    Fever.    Loss of appetite.    Pain in your forehead, behind your eyes, and over your cheekbones (sinus pain).   Muscle aches.      DIAGNOSIS  Your health care provider may diagnose a URI by:    Physical exam.  Tests to check that your symptoms are not due to another condition such as:   Strep throat.  Sinusitis.  Pneumonia.  Asthma.     TREATMENT  A URI goes away on its own with time. It cannot be cured with medicines, but medicines may be prescribed or recommended to relieve symptoms. Medicines may help:    Reduce your fever.   Reduce your cough.   Relieve nasal congestion.     HOME CARE INSTRUCTIONS  Take medicines only as directed by your health care provider.    Gargle warm saltwater or take cough drops to comfort your throat as directed by your health care provider.  Use a warm mist humidifier or inhale steam from a shower to increase air moisture. This may make it easier to breathe.  Drink enough fluid to keep your urine clear or pale yellow.    Eat soups and other clear broths and maintain good nutrition.    Rest as needed.    Return to work when your temperature has returned to normal or as your health care provider advises. You may need to stay home longer to avoid infecting others. You can also use a face mask and careful hand washing to prevent spread of the virus.  Increase the usage of your inhaler if you have asthma.    Do not use any tobacco products, including cigarettes, chewing tobacco, or electronic cigarettes. If you need help quitting, ask your health care provider.    PREVENTION  The best way to protect yourself from getting a cold is to practice good hygiene.     Avoid oral or hand contact with people with cold symptoms.    Wash your hands often if contact occurs.      There is no clear evidence that vitamin C, vitamin E, echinacea, or exercise reduces the chance of developing a cold. However, it is always recommended to get plenty of rest, exercise, and practice good nutrition.     SEEK MEDICAL CARE IF:  You are getting worse rather than better.    Your symptoms are not controlled by medicine.    You have chills.  You have worsening shortness of breath.  You have brown or red mucus.  You have yellow or brown nasal discharge.  You have pain in your face, especially when you bend forward.  You have a fever.  You have swollen neck glands.  You have pain while swallowing.  You have white areas in the back of your throat.     SEEK IMMEDIATE MEDICAL CARE IF:  You have severe or persistent:  Headache.  Ear pain.  Sinus pain.  Chest pain.  You have chronic lung disease and any of the following:  Wheezing.  Prolonged cough.  Coughing up blood.  A change in your usual mucus.  You have a stiff neck.  You have changes in your:  Vision.  Hearing.  Thinking.  Mood.     MAKE SURE YOU:  Understand these instructions.  Will watch your condition.  Will get help right away if you are not doing well or get worse.    ADDITIONAL NOTES AND INSTRUCTIONS    Please follow up with your Primary MD in 24-48 hr.  Seek immediate medical care for any new/worsening signs or symptoms.

## 2023-11-19 NOTE — ED PROVIDER NOTE - PATIENT PORTAL LINK FT
You can access the FollowMyHealth Patient Portal offered by [facility] by registering at the following website: [email or web address]. By joining [facility]’s FollowMyHealth portal, you will also be able to view your health information using other applications (apps) compatible with our system.

## 2023-11-19 NOTE — ED ADULT TRIAGE NOTE - MODE OF ARRIVAL
Is This A New Presentation, Or A Follow-Up?: Skin Lesions
What Type Of Note Output Would You Prefer (Optional)?: Standard Output
How Severe Is Your Skin Lesion?: mild
Which Family Member (Optional)?: Daughter
Walk in

## 2023-11-19 NOTE — ED PROVIDER NOTE - CARE PLAN
1 Principal Discharge DX:	URTI (acute upper respiratory infection)  Assessment and plan of treatment:	URTI  Left otitis   Principal Discharge DX:	Acute otitis externa of left ear  Assessment and plan of treatment:	URTI  Left otitis  Secondary Diagnosis:	URI, acute

## 2023-11-19 NOTE — ED PROVIDER NOTE - OBJECTIVE STATEMENT
Pt is a 39y/o fermale with a pmhx of HTN, Bipolar dz from [facility] presenting with mother for left ear pain, nonproductive cough since Nov 17th. Seen at urgent care, prescribed azithromycin. Denies fever/chills, rhinorrhea, sore throat, CP, n/v/d or weakness. Has seen ENT Dr. Monterroso in the past for recurrent ear infections, has had one other ear infection this year.

## 2023-11-19 NOTE — ED PROVIDER NOTE - CLINICAL SUMMARY MEDICAL DECISION MAKING FREE TEXT BOX
Labs and EKG were ordered and reviewed, where indicated.  Imaging was ordered and reviewed by me, where indicated.  Appropriate medications for patient's presenting complaints were ordered and effects were reassessed, where indicated.  Patient's records (prior hospital, ED visit, and/or nursing home note) were reviewed, if available.  Additional history was obtained from EMS, family, and/or PCP (where available).  Escalation to admission/observation was considered.  However patient feels much better and patient/parent is comfortable with discharge.  Appropriate follow-up was arranged.    recent L ear pain, uri sx/cough - L OE, cipro ggt, strict return precautions discussed, rec outpt PCP f/u

## 2023-11-20 ENCOUNTER — APPOINTMENT (OUTPATIENT)
Dept: PSYCHIATRY | Facility: CLINIC | Age: 38
End: 2023-11-20

## 2023-11-22 NOTE — ED POST DISCHARGE NOTE - RESULT SUMMARY
Pharmacy called, patient allergic to Cipro. Will prescribe Cortiosporin otic 4 drops to affected ear TID x 1 week

## 2023-11-27 PROBLEM — G62.9 NEUROPATHY: Status: ACTIVE | Noted: 2021-05-19

## 2023-11-27 PROBLEM — E11.8 DIABETIC FOOT: Status: ACTIVE | Noted: 2017-10-16

## 2023-11-29 ENCOUNTER — APPOINTMENT (OUTPATIENT)
Dept: PSYCHIATRY | Facility: CLINIC | Age: 38
End: 2023-11-29
Payer: MEDICAID

## 2023-11-29 ENCOUNTER — OUTPATIENT (OUTPATIENT)
Dept: OUTPATIENT SERVICES | Facility: HOSPITAL | Age: 38
LOS: 1 days | End: 2023-11-29
Payer: MEDICAID

## 2023-11-29 DIAGNOSIS — F31.81 BIPOLAR II DISORDER: ICD-10-CM

## 2023-11-29 PROCEDURE — 99213 OFFICE O/P EST LOW 20 MIN: CPT

## 2023-11-29 PROCEDURE — ZZZZZ: CPT

## 2023-11-30 DIAGNOSIS — F31.81 BIPOLAR II DISORDER: ICD-10-CM

## 2023-12-04 ENCOUNTER — NON-APPOINTMENT (OUTPATIENT)
Age: 38
End: 2023-12-04

## 2023-12-05 ENCOUNTER — NON-APPOINTMENT (OUTPATIENT)
Age: 38
End: 2023-12-05

## 2023-12-05 DIAGNOSIS — F60.3 BORDERLINE PERSONALITY DISORDER: ICD-10-CM

## 2023-12-06 ENCOUNTER — OUTPATIENT (OUTPATIENT)
Dept: OUTPATIENT SERVICES | Facility: HOSPITAL | Age: 38
LOS: 1 days | End: 2023-12-06
Payer: MEDICAID

## 2023-12-06 ENCOUNTER — APPOINTMENT (OUTPATIENT)
Dept: INTERNAL MEDICINE | Facility: CLINIC | Age: 38
End: 2023-12-06
Payer: MEDICAID

## 2023-12-06 VITALS
HEART RATE: 104 BPM | HEIGHT: 64 IN | TEMPERATURE: 98.3 F | SYSTOLIC BLOOD PRESSURE: 134 MMHG | BODY MASS INDEX: 34.31 KG/M2 | WEIGHT: 201 LBS | DIASTOLIC BLOOD PRESSURE: 80 MMHG

## 2023-12-06 DIAGNOSIS — Z00.00 ENCOUNTER FOR GENERAL ADULT MEDICAL EXAMINATION WITHOUT ABNORMAL FINDINGS: ICD-10-CM

## 2023-12-06 DIAGNOSIS — H66.90 OTITIS MEDIA, UNSPECIFIED, UNSPECIFIED EAR: ICD-10-CM

## 2023-12-06 DIAGNOSIS — R10.9 UNSPECIFIED ABDOMINAL PAIN: ICD-10-CM

## 2023-12-06 DIAGNOSIS — R74.8 ABNORMAL LEVELS OF OTHER SERUM ENZYMES: ICD-10-CM

## 2023-12-06 PROCEDURE — 99214 OFFICE O/P EST MOD 30 MIN: CPT

## 2023-12-06 RX ORDER — OMEPRAZOLE 40 MG/1
40 CAPSULE, DELAYED RELEASE ORAL
Qty: 90 | Refills: 1 | Status: DISCONTINUED | COMMUNITY
End: 2023-12-06

## 2023-12-08 ENCOUNTER — APPOINTMENT (OUTPATIENT)
Dept: PSYCHIATRY | Facility: CLINIC | Age: 38
End: 2023-12-08

## 2023-12-11 DIAGNOSIS — R10.9 UNSPECIFIED ABDOMINAL PAIN: ICD-10-CM

## 2023-12-11 DIAGNOSIS — H66.90 OTITIS MEDIA, UNSPECIFIED, UNSPECIFIED EAR: ICD-10-CM

## 2023-12-11 DIAGNOSIS — R74.8 ABNORMAL LEVELS OF OTHER SERUM ENZYMES: ICD-10-CM

## 2023-12-11 DIAGNOSIS — R05.9 COUGH, UNSPECIFIED: ICD-10-CM

## 2023-12-11 DIAGNOSIS — E11.9 TYPE 2 DIABETES MELLITUS WITHOUT COMPLICATIONS: ICD-10-CM

## 2023-12-12 RX ORDER — AMITRIPTYLINE HCL 75 MG
75 TABLET ORAL
Refills: 0 | Status: DISCONTINUED | COMMUNITY
End: 2023-12-12

## 2023-12-12 NOTE — BH INPATIENT PSYCHIATRY PROGRESS NOTE - NSBHATTESTBILLING_PSY_A_CORE
2
65867-Qivsiwpgrr OBS or IP - low complexity OR 25-34 mins
84803-Jhhrrnrqam OBS or IP - low complexity OR 25-34 mins
51303-Eqnmzbhjpa OBS or IP - low complexity OR 25-34 mins
86949-Pansdywdxl OBS or IP - low complexity OR 25-34 mins

## 2023-12-18 ENCOUNTER — APPOINTMENT (OUTPATIENT)
Dept: PSYCHIATRY | Facility: CLINIC | Age: 38
End: 2023-12-18

## 2023-12-18 DIAGNOSIS — H92.09 OTALGIA, UNSPECIFIED EAR: ICD-10-CM

## 2023-12-20 ENCOUNTER — EMERGENCY (EMERGENCY)
Facility: HOSPITAL | Age: 38
LOS: 0 days | Discharge: HOME | End: 2023-12-20
Attending: STUDENT IN AN ORGANIZED HEALTH CARE EDUCATION/TRAINING PROGRAM
Payer: MEDICAID

## 2023-12-20 VITALS
TEMPERATURE: 98 F | RESPIRATION RATE: 18 BRPM | OXYGEN SATURATION: 99 % | DIASTOLIC BLOOD PRESSURE: 72 MMHG | SYSTOLIC BLOOD PRESSURE: 114 MMHG | HEIGHT: 64 IN | HEART RATE: 92 BPM

## 2023-12-20 DIAGNOSIS — Z88.1 ALLERGY STATUS TO OTHER ANTIBIOTIC AGENTS STATUS: ICD-10-CM

## 2023-12-20 DIAGNOSIS — F31.9 BIPOLAR DISORDER, UNSPECIFIED: ICD-10-CM

## 2023-12-20 DIAGNOSIS — K21.9 GASTRO-ESOPHAGEAL REFLUX DISEASE WITHOUT ESOPHAGITIS: ICD-10-CM

## 2023-12-20 DIAGNOSIS — Z88.7 ALLERGY STATUS TO SERUM AND VACCINE: ICD-10-CM

## 2023-12-20 DIAGNOSIS — R53.1 WEAKNESS: ICD-10-CM

## 2023-12-20 DIAGNOSIS — R07.9 CHEST PAIN, UNSPECIFIED: ICD-10-CM

## 2023-12-20 DIAGNOSIS — H92.02 OTALGIA, LEFT EAR: ICD-10-CM

## 2023-12-20 DIAGNOSIS — M54.2 CERVICALGIA: ICD-10-CM

## 2023-12-20 DIAGNOSIS — Z88.0 ALLERGY STATUS TO PENICILLIN: ICD-10-CM

## 2023-12-20 DIAGNOSIS — I10 ESSENTIAL (PRIMARY) HYPERTENSION: ICD-10-CM

## 2023-12-20 DIAGNOSIS — Z88.8 ALLERGY STATUS TO OTHER DRUGS, MEDICAMENTS AND BIOLOGICAL SUBSTANCES: ICD-10-CM

## 2023-12-20 LAB
ALBUMIN SERPL ELPH-MCNC: 4.1 G/DL — SIGNIFICANT CHANGE UP (ref 3.5–5.2)
ALBUMIN SERPL ELPH-MCNC: 4.1 G/DL — SIGNIFICANT CHANGE UP (ref 3.5–5.2)
ALP SERPL-CCNC: 219 U/L — HIGH (ref 30–115)
ALP SERPL-CCNC: 219 U/L — HIGH (ref 30–115)
ALT FLD-CCNC: 131 U/L — HIGH (ref 0–41)
ALT FLD-CCNC: 131 U/L — HIGH (ref 0–41)
ANION GAP SERPL CALC-SCNC: 11 MMOL/L — SIGNIFICANT CHANGE UP (ref 7–14)
ANION GAP SERPL CALC-SCNC: 11 MMOL/L — SIGNIFICANT CHANGE UP (ref 7–14)
AST SERPL-CCNC: 56 U/L — HIGH (ref 0–41)
AST SERPL-CCNC: 56 U/L — HIGH (ref 0–41)
B-OH-BUTYR SERPL-SCNC: <0.2 MMOL/L — SIGNIFICANT CHANGE UP
B-OH-BUTYR SERPL-SCNC: <0.2 MMOL/L — SIGNIFICANT CHANGE UP
BASE EXCESS BLDV CALC-SCNC: 0.1 MMOL/L — SIGNIFICANT CHANGE UP (ref -2–3)
BASE EXCESS BLDV CALC-SCNC: 0.1 MMOL/L — SIGNIFICANT CHANGE UP (ref -2–3)
BASOPHILS # BLD AUTO: 0.05 K/UL — SIGNIFICANT CHANGE UP (ref 0–0.2)
BASOPHILS # BLD AUTO: 0.05 K/UL — SIGNIFICANT CHANGE UP (ref 0–0.2)
BASOPHILS NFR BLD AUTO: 0.5 % — SIGNIFICANT CHANGE UP (ref 0–1)
BASOPHILS NFR BLD AUTO: 0.5 % — SIGNIFICANT CHANGE UP (ref 0–1)
BILIRUB SERPL-MCNC: 0.2 MG/DL — SIGNIFICANT CHANGE UP (ref 0.2–1.2)
BILIRUB SERPL-MCNC: 0.2 MG/DL — SIGNIFICANT CHANGE UP (ref 0.2–1.2)
BUN SERPL-MCNC: 10 MG/DL — SIGNIFICANT CHANGE UP (ref 10–20)
BUN SERPL-MCNC: 10 MG/DL — SIGNIFICANT CHANGE UP (ref 10–20)
CA-I SERPL-SCNC: 1.19 MMOL/L — SIGNIFICANT CHANGE UP (ref 1.15–1.33)
CA-I SERPL-SCNC: 1.19 MMOL/L — SIGNIFICANT CHANGE UP (ref 1.15–1.33)
CALCIUM SERPL-MCNC: 9.2 MG/DL — SIGNIFICANT CHANGE UP (ref 8.4–10.5)
CALCIUM SERPL-MCNC: 9.2 MG/DL — SIGNIFICANT CHANGE UP (ref 8.4–10.5)
CHLORIDE SERPL-SCNC: 97 MMOL/L — LOW (ref 98–110)
CHLORIDE SERPL-SCNC: 97 MMOL/L — LOW (ref 98–110)
CO2 SERPL-SCNC: 22 MMOL/L — SIGNIFICANT CHANGE UP (ref 17–32)
CO2 SERPL-SCNC: 22 MMOL/L — SIGNIFICANT CHANGE UP (ref 17–32)
CREAT SERPL-MCNC: 0.6 MG/DL — LOW (ref 0.7–1.5)
CREAT SERPL-MCNC: 0.6 MG/DL — LOW (ref 0.7–1.5)
EGFR: 118 ML/MIN/1.73M2 — SIGNIFICANT CHANGE UP
EGFR: 118 ML/MIN/1.73M2 — SIGNIFICANT CHANGE UP
EOSINOPHIL # BLD AUTO: 0.09 K/UL — SIGNIFICANT CHANGE UP (ref 0–0.7)
EOSINOPHIL # BLD AUTO: 0.09 K/UL — SIGNIFICANT CHANGE UP (ref 0–0.7)
EOSINOPHIL NFR BLD AUTO: 0.8 % — SIGNIFICANT CHANGE UP (ref 0–8)
EOSINOPHIL NFR BLD AUTO: 0.8 % — SIGNIFICANT CHANGE UP (ref 0–8)
GAS PNL BLDV: 131 MMOL/L — LOW (ref 136–145)
GAS PNL BLDV: 131 MMOL/L — LOW (ref 136–145)
GAS PNL BLDV: SIGNIFICANT CHANGE UP
GAS PNL BLDV: SIGNIFICANT CHANGE UP
GLUCOSE SERPL-MCNC: 350 MG/DL — HIGH (ref 70–99)
GLUCOSE SERPL-MCNC: 350 MG/DL — HIGH (ref 70–99)
HCG SERPL QL: NEGATIVE — SIGNIFICANT CHANGE UP
HCG SERPL QL: NEGATIVE — SIGNIFICANT CHANGE UP
HCO3 BLDV-SCNC: 26 MMOL/L — SIGNIFICANT CHANGE UP (ref 22–29)
HCO3 BLDV-SCNC: 26 MMOL/L — SIGNIFICANT CHANGE UP (ref 22–29)
HCT VFR BLD CALC: 41.6 % — SIGNIFICANT CHANGE UP (ref 37–47)
HCT VFR BLD CALC: 41.6 % — SIGNIFICANT CHANGE UP (ref 37–47)
HCT VFR BLDA CALC: 41 % — SIGNIFICANT CHANGE UP (ref 34.5–46.5)
HCT VFR BLDA CALC: 41 % — SIGNIFICANT CHANGE UP (ref 34.5–46.5)
HGB BLD CALC-MCNC: 13.7 G/DL — SIGNIFICANT CHANGE UP (ref 11.7–16.1)
HGB BLD CALC-MCNC: 13.7 G/DL — SIGNIFICANT CHANGE UP (ref 11.7–16.1)
HGB BLD-MCNC: 13.6 G/DL — SIGNIFICANT CHANGE UP (ref 12–16)
HGB BLD-MCNC: 13.6 G/DL — SIGNIFICANT CHANGE UP (ref 12–16)
IMM GRANULOCYTES NFR BLD AUTO: 0.8 % — HIGH (ref 0.1–0.3)
IMM GRANULOCYTES NFR BLD AUTO: 0.8 % — HIGH (ref 0.1–0.3)
LACTATE BLDV-MCNC: 1.2 MMOL/L — SIGNIFICANT CHANGE UP (ref 0.5–2)
LACTATE BLDV-MCNC: 1.2 MMOL/L — SIGNIFICANT CHANGE UP (ref 0.5–2)
LYMPHOCYTES # BLD AUTO: 1.52 K/UL — SIGNIFICANT CHANGE UP (ref 1.2–3.4)
LYMPHOCYTES # BLD AUTO: 1.52 K/UL — SIGNIFICANT CHANGE UP (ref 1.2–3.4)
LYMPHOCYTES # BLD AUTO: 14.3 % — LOW (ref 20.5–51.1)
LYMPHOCYTES # BLD AUTO: 14.3 % — LOW (ref 20.5–51.1)
MAGNESIUM SERPL-MCNC: 1.9 MG/DL — SIGNIFICANT CHANGE UP (ref 1.8–2.4)
MAGNESIUM SERPL-MCNC: 1.9 MG/DL — SIGNIFICANT CHANGE UP (ref 1.8–2.4)
MCHC RBC-ENTMCNC: 28.9 PG — SIGNIFICANT CHANGE UP (ref 27–31)
MCHC RBC-ENTMCNC: 28.9 PG — SIGNIFICANT CHANGE UP (ref 27–31)
MCHC RBC-ENTMCNC: 32.7 G/DL — SIGNIFICANT CHANGE UP (ref 32–37)
MCHC RBC-ENTMCNC: 32.7 G/DL — SIGNIFICANT CHANGE UP (ref 32–37)
MCV RBC AUTO: 88.5 FL — SIGNIFICANT CHANGE UP (ref 81–99)
MCV RBC AUTO: 88.5 FL — SIGNIFICANT CHANGE UP (ref 81–99)
MONOCYTES # BLD AUTO: 0.54 K/UL — SIGNIFICANT CHANGE UP (ref 0.1–0.6)
MONOCYTES # BLD AUTO: 0.54 K/UL — SIGNIFICANT CHANGE UP (ref 0.1–0.6)
MONOCYTES NFR BLD AUTO: 5.1 % — SIGNIFICANT CHANGE UP (ref 1.7–9.3)
MONOCYTES NFR BLD AUTO: 5.1 % — SIGNIFICANT CHANGE UP (ref 1.7–9.3)
NEUTROPHILS # BLD AUTO: 8.37 K/UL — HIGH (ref 1.4–6.5)
NEUTROPHILS # BLD AUTO: 8.37 K/UL — HIGH (ref 1.4–6.5)
NEUTROPHILS NFR BLD AUTO: 78.5 % — HIGH (ref 42.2–75.2)
NEUTROPHILS NFR BLD AUTO: 78.5 % — HIGH (ref 42.2–75.2)
NRBC # BLD: 0 /100 WBCS — SIGNIFICANT CHANGE UP (ref 0–0)
NRBC # BLD: 0 /100 WBCS — SIGNIFICANT CHANGE UP (ref 0–0)
PCO2 BLDV: 46 MMHG — HIGH (ref 39–42)
PCO2 BLDV: 46 MMHG — HIGH (ref 39–42)
PH BLDV: 7.36 — SIGNIFICANT CHANGE UP (ref 7.32–7.43)
PH BLDV: 7.36 — SIGNIFICANT CHANGE UP (ref 7.32–7.43)
PLATELET # BLD AUTO: 395 K/UL — SIGNIFICANT CHANGE UP (ref 130–400)
PLATELET # BLD AUTO: 395 K/UL — SIGNIFICANT CHANGE UP (ref 130–400)
PMV BLD: 9.9 FL — SIGNIFICANT CHANGE UP (ref 7.4–10.4)
PMV BLD: 9.9 FL — SIGNIFICANT CHANGE UP (ref 7.4–10.4)
PO2 BLDV: 36 MMHG — SIGNIFICANT CHANGE UP (ref 25–45)
PO2 BLDV: 36 MMHG — SIGNIFICANT CHANGE UP (ref 25–45)
POTASSIUM BLDV-SCNC: 4.3 MMOL/L — SIGNIFICANT CHANGE UP (ref 3.5–5.1)
POTASSIUM BLDV-SCNC: 4.3 MMOL/L — SIGNIFICANT CHANGE UP (ref 3.5–5.1)
POTASSIUM SERPL-MCNC: 5.3 MMOL/L — HIGH (ref 3.5–5)
POTASSIUM SERPL-MCNC: 5.3 MMOL/L — HIGH (ref 3.5–5)
POTASSIUM SERPL-SCNC: 5.3 MMOL/L — HIGH (ref 3.5–5)
POTASSIUM SERPL-SCNC: 5.3 MMOL/L — HIGH (ref 3.5–5)
PROT SERPL-MCNC: 7.4 G/DL — SIGNIFICANT CHANGE UP (ref 6–8)
PROT SERPL-MCNC: 7.4 G/DL — SIGNIFICANT CHANGE UP (ref 6–8)
RBC # BLD: 4.7 M/UL — SIGNIFICANT CHANGE UP (ref 4.2–5.4)
RBC # BLD: 4.7 M/UL — SIGNIFICANT CHANGE UP (ref 4.2–5.4)
RBC # FLD: 12.9 % — SIGNIFICANT CHANGE UP (ref 11.5–14.5)
RBC # FLD: 12.9 % — SIGNIFICANT CHANGE UP (ref 11.5–14.5)
SAO2 % BLDV: 59.3 % — LOW (ref 67–88)
SAO2 % BLDV: 59.3 % — LOW (ref 67–88)
SODIUM SERPL-SCNC: 130 MMOL/L — LOW (ref 135–146)
SODIUM SERPL-SCNC: 130 MMOL/L — LOW (ref 135–146)
TROPONIN T, HIGH SENSITIVITY RESULT: <6 NG/L — SIGNIFICANT CHANGE UP (ref 6–13)
TROPONIN T, HIGH SENSITIVITY RESULT: <6 NG/L — SIGNIFICANT CHANGE UP (ref 6–13)
WBC # BLD: 10.65 K/UL — SIGNIFICANT CHANGE UP (ref 4.8–10.8)
WBC # BLD: 10.65 K/UL — SIGNIFICANT CHANGE UP (ref 4.8–10.8)
WBC # FLD AUTO: 10.65 K/UL — SIGNIFICANT CHANGE UP (ref 4.8–10.8)
WBC # FLD AUTO: 10.65 K/UL — SIGNIFICANT CHANGE UP (ref 4.8–10.8)

## 2023-12-20 PROCEDURE — 82330 ASSAY OF CALCIUM: CPT

## 2023-12-20 PROCEDURE — 99285 EMERGENCY DEPT VISIT HI MDM: CPT

## 2023-12-20 PROCEDURE — 93010 ELECTROCARDIOGRAM REPORT: CPT

## 2023-12-20 PROCEDURE — 80053 COMPREHEN METABOLIC PANEL: CPT

## 2023-12-20 PROCEDURE — 93005 ELECTROCARDIOGRAM TRACING: CPT

## 2023-12-20 PROCEDURE — 84132 ASSAY OF SERUM POTASSIUM: CPT

## 2023-12-20 PROCEDURE — 82010 KETONE BODYS QUAN: CPT

## 2023-12-20 PROCEDURE — 85025 COMPLETE CBC W/AUTO DIFF WBC: CPT

## 2023-12-20 PROCEDURE — 85014 HEMATOCRIT: CPT

## 2023-12-20 PROCEDURE — 84703 CHORIONIC GONADOTROPIN ASSAY: CPT

## 2023-12-20 PROCEDURE — 96374 THER/PROPH/DIAG INJ IV PUSH: CPT

## 2023-12-20 PROCEDURE — 83735 ASSAY OF MAGNESIUM: CPT

## 2023-12-20 PROCEDURE — 84484 ASSAY OF TROPONIN QUANT: CPT

## 2023-12-20 PROCEDURE — 71045 X-RAY EXAM CHEST 1 VIEW: CPT

## 2023-12-20 PROCEDURE — 82803 BLOOD GASES ANY COMBINATION: CPT

## 2023-12-20 PROCEDURE — 36415 COLL VENOUS BLD VENIPUNCTURE: CPT

## 2023-12-20 PROCEDURE — 99285 EMERGENCY DEPT VISIT HI MDM: CPT | Mod: 25

## 2023-12-20 PROCEDURE — 83605 ASSAY OF LACTIC ACID: CPT

## 2023-12-20 PROCEDURE — 85018 HEMOGLOBIN: CPT

## 2023-12-20 PROCEDURE — 71045 X-RAY EXAM CHEST 1 VIEW: CPT | Mod: 26

## 2023-12-20 PROCEDURE — 84295 ASSAY OF SERUM SODIUM: CPT

## 2023-12-20 RX ORDER — KETOROLAC TROMETHAMINE 30 MG/ML
15 SYRINGE (ML) INJECTION ONCE
Refills: 0 | Status: DISCONTINUED | OUTPATIENT
Start: 2023-12-20 | End: 2023-12-20

## 2023-12-20 RX ORDER — ASPIRIN/CALCIUM CARB/MAGNESIUM 324 MG
162 TABLET ORAL ONCE
Refills: 0 | Status: COMPLETED | OUTPATIENT
Start: 2023-12-20 | End: 2023-12-20

## 2023-12-20 RX ADMIN — Medication 15 MILLIGRAM(S): at 16:15

## 2023-12-20 RX ADMIN — Medication 162 MILLIGRAM(S): at 14:18

## 2023-12-20 NOTE — ED ADULT NURSE NOTE - NS ED NURSE DISCH DISPOSITION
----- Message from MANDO Pickett sent at 4/2/2021  8:15 AM CDT -----  Please call patient and scheduled follow up visit for early next week.    
LM for pt to call back and schedule follow up.  
Discharged

## 2023-12-20 NOTE — ED PROVIDER NOTE - OBJECTIVE STATEMENT
37 yo female with a pmh of htn and bipolar disorder presents c/o constant chest pain for 3 days. pt notes the pain to be constant with no radiation and describes it as "crushing" in nature. pt denies any other symptoms including fevers, chill, headache, recent illness/travel, cough, abdominal pain, or SOB. 39 yo female with a pmh of htn and bipolar disorder presents c/o constant chest pain for 3 days. pt notes the pain to be constant with no radiation and describes it as "crushing" in nature. pt denies any other symptoms including fevers, chill, headache, recent illness/travel, cough, abdominal pain, or SOB.

## 2023-12-20 NOTE — ED PROVIDER NOTE - NSFOLLOWUPINSTRUCTIONS_ED_ALL_ED_FT
Please follow up with your primary care physician within 24-72 hours and return immediately if symptoms worsen.    Our Emergency Department Referral Coordinators will be reaching out to you in the next 24-48 hours from 9:00am to 5:00pm with a follow up appointment. Please expect a phone call from the hospital in that time frame. If you do not receive a call or if you have any questions or concerns, you can reach them at   [phone]    Chest Pain    WHAT YOU NEED TO KNOW:    Chest pain can be caused by a range of conditions, from not serious to life-threatening. Chest pain can be a symptom of a digestive problem, such as acid reflux or a stomach ulcer. An anxiety attack or a strong emotion, such as anger, can also cause chest pain. Infection, inflammation, or a fracture in the bones or cartilage in your chest can cause pain or discomfort. Sometimes chest pain or pressure is caused by poor blood flow to your heart (angina). Chest pain may also be caused by life-threatening conditions such as a heart attack or blood clot in your lungs.     DISCHARGE INSTRUCTIONS:    Call 911 if:     You have any of the following signs of a heart attack:   Squeezing, pressure, or pain in your chest       and any of the following:   Discomfort or pain in your back, neck, jaw, stomach, or arm       Shortness of breath      Nausea or vomiting      Lightheadedness or a sudden cold sweat        Return to the emergency department if:     You have chest discomfort that gets worse, even with medicine.      You cough or vomit blood.       Your bowel movements are black or bloody.       You cannot stop vomiting, or it hurts to swallow.     Contact your healthcare provider if:     You have questions or concerns about your condition or care.        Medicines:     Medicines may be given to treat the cause of your chest pain. Examples include pain medicine, anxiety medicine, or medicines to increase blood flow to your heart.       Do not take certain medicines without asking your healthcare provider first. These include NSAIDs, herbal or vitamin supplements, or hormones (estrogen or progestin).       Take your medicine as directed. Contact your healthcare provider if you think your medicine is not helping or if you have side effects. Tell him or her if you are allergic to any medicine. Keep a list of the medicines, vitamins, and herbs you take. Include the amounts, and when and why you take them. Bring the list or the pill bottles to follow-up visits. Carry your medicine list with you in case of an emergency.    Follow up with your healthcare provider within 72 hours, or as directed: You may need to return for more tests to find the cause of your chest pain. You may be referred to a specialist, such as a cardiologist or gastroenterologist. Write down your questions so you remember to ask them during your visits.     Healthy living tips: The following are general healthy guidelines. If your chest pain is caused by a heart problem, your healthcare provider will give you specific guidelines to follow.    Do not smoke. Nicotine and other chemicals in cigarettes and cigars can cause lung and heart damage. Ask your healthcare provider for information if you currently smoke and need help to quit. E-cigarettes or smokeless tobacco still contain nicotine. Talk to your healthcare provider before you use these products.       Eat a variety of healthy, low-fat, low-salt foods. Healthy foods include fruits, vegetables, whole-grain breads, low-fat dairy products, beans, lean meats, and fish. Ask for more information about a heart healthy diet.      Drink plenty of water every day. Your body is made of mostly water. Water helps your body to control your temperature and blood pressure. Ask your healthcare provider how much water you should drink every day.      Ask about activity. Your healthcare provider will tell you which activities to limit or avoid. Ask when you can drive, return to work, and have sex. Ask about the best exercise plan for you.      Maintain a healthy weight. Ask your healthcare provider how much you should weigh. Ask him or her to help you create a weight loss plan if you are overweight.       Get the flu and pneumonia vaccines. All adults should get the influenza (flu) vaccine. Get it every year as soon as it becomes available. The pneumococcal vaccine is given to adults aged 65 years or older. The vaccine is given every 5 years to prevent pneumococcal disease, such as pneumonia.    If you have a stent:     Carry your stent card with you at all times.       Let all healthcare providers know that you have a stent.          © Copyright [employer] 2019 All illustrations and images included in CareNotes are the copyrighted property of [employer] or [employer]. Please follow up with your primary care physician within 24-72 hours and return immediately if symptoms worsen.    Our Emergency Department Referral Coordinators will be reaching out to you in the next 24-48 hours from 9:00am to 5:00pm with a follow up appointment. Please expect a phone call from the hospital in that time frame. If you do not receive a call or if you have any questions or concerns, you can reach them at   [phone]    Chest Pain    WHAT YOU NEED TO KNOW:    Chest pain can be caused by a range of conditions, from not serious to life-threatening. Chest pain can be a symptom of a digestive problem, such as acid reflux or a stomach ulcer. An anxiety attack or a strong emotion, such as anger, can also cause chest pain. Infection, inflammation, or a fracture in the bones or cartilage in your chest can cause pain or discomfort. Sometimes chest pain or pressure is caused by poor blood flow to your heart (angina). Chest pain may also be caused by life-threatening conditions such as a heart attack or blood clot in your lungs.     DISCHARGE INSTRUCTIONS:    Call 911 if:     You have any of the following signs of a heart attack:   Squeezing, pressure, or pain in your chest       and any of the following:   Discomfort or pain in your back, neck, jaw, stomach, or arm       Shortness of breath      Nausea or vomiting      Lightheadedness or a sudden cold sweat        Return to the emergency department if:     You have chest discomfort that gets worse, even with medicine.      You cough or vomit blood.       Your bowel movements are black or bloody.       You cannot stop vomiting, or it hurts to swallow.     Contact your healthcare provider if:     You have questions or concerns about your condition or care.        Medicines:     Medicines may be given to treat the cause of your chest pain. Examples include pain medicine, anxiety medicine, or medicines to increase blood flow to your heart.       Do not take certain medicines without asking your healthcare provider first. These include NSAIDs, herbal or vitamin supplements, or hormones (estrogen or progestin).       Take your medicine as directed. Contact your healthcare provider if you think your medicine is not helping or if you have side effects. Tell him or her if you are allergic to any medicine. Keep a list of the medicines, vitamins, and herbs you take. Include the amounts, and when and why you take them. Bring the list or the pill bottles to follow-up visits. Carry your medicine list with you in case of an emergency.    Follow up with your healthcare provider within 72 hours, or as directed: You may need to return for more tests to find the cause of your chest pain. You may be referred to a specialist, such as a cardiologist or gastroenterologist. Write down your questions so you remember to ask them during your visits.     Healthy living tips: The following are general healthy guidelines. If your chest pain is caused by a heart problem, your healthcare provider will give you specific guidelines to follow.    Do not smoke. Nicotine and other chemicals in cigarettes and cigars can cause lung and heart damage. Ask your healthcare provider for information if you currently smoke and need help to quit. E-cigarettes or smokeless tobacco still contain nicotine. Talk to your healthcare provider before you use these products.       Eat a variety of healthy, low-fat, low-salt foods. Healthy foods include fruits, vegetables, whole-grain breads, low-fat dairy products, beans, lean meats, and fish. Ask for more information about a heart healthy diet.      Drink plenty of water every day. Your body is made of mostly water. Water helps your body to control your temperature and blood pressure. Ask your healthcare provider how much water you should drink every day.      Ask about activity. Your healthcare provider will tell you which activities to limit or avoid. Ask when you can drive, return to work, and have sex. Ask about the best exercise plan for you.      Maintain a healthy weight. Ask your healthcare provider how much you should weigh. Ask him or her to help you create a weight loss plan if you are overweight.       Get the flu and pneumonia vaccines. All adults should get the influenza (flu) vaccine. Get it every year as soon as it becomes available. The pneumococcal vaccine is given to adults aged 65 years or older. The vaccine is given every 5 years to prevent pneumococcal disease, such as pneumonia.    If you have a stent:     Carry your stent card with you at all times.       Let all healthcare providers know that you have a stent.          © Copyright [employer] 2019 All illustrations and images included in CareNotes are the copyrighted property of [employer] or [employer].

## 2023-12-20 NOTE — ED PROVIDER NOTE - PHYSICAL EXAMINATION
Gen: NAD, AOx3  Head: NCAT  HEENT: bilateral TM clearr, PERRL, oral mucosa moist, normal conjunctiva, oropharynx clear without exudate or erythema  Lung: CTAB, no respiratory distress, no wheezing, rales, rhonchi  CV: normal s1/s2, rrr, Normal perfusion, pulses 2+ throughout  Abd: soft, NTND, no CVA tenderness  Genitourinary: no pelvic tenderness  MSK: No edema, no visible deformities, full range of motion in all 4 extremities  Neuro: CN II-XII grossly intact, No focal neurologic deficits, no meningeal signs, sensation intact, strength 5/5 BUE/BLE, steady gait   Skin: No rash   Psych: normal affect

## 2023-12-20 NOTE — ED ADULT TRIAGE NOTE - HEIGHT IN FEET
What Type Of Note Output Would You Prefer (Optional)?: Standard Output
How Severe Are Your Spot(S)?: mild
Have Your Spot(S) Been Treated In The Past?: has not been treated
Hpi Title: Evaluation of Skin Lesions
5

## 2023-12-20 NOTE — ED PROVIDER NOTE - PATIENT PORTAL LINK FT
You can access the FollowMyHealth Patient Portal offered by [facility] by registering at the following website: [email or web address]. By joining [employer]’s FollowMyHealth portal, you will also be able to view your health information using other applications (apps) compatible with our system. You can access the FollowMyHealth Patient Portal offered by [facility] by registering at the following website: [email or web address]. By joining [employer]’s FollowMyHealth portal, you will also be able to view your health information using other applications (apps) compatible with our system.

## 2023-12-20 NOTE — ED PROVIDER NOTE - CLINICAL SUMMARY MEDICAL DECISION MAKING FREE TEXT BOX
39 yo female, PMHx of HTN, DM, Bipolar, GERD, from [place], presenting with 3 days of intermittent chest pain, non-radiating, no alleviating or aggravating factors, associated with generalized weakness. She also notes she has been having several weeks of left ear pain. Per mother, patient sleeps on an uneven mattress and she complains of pain in her left neck that radiates to her ear and worse with movement, likely musculoskeletal. Denies fevers, chills, nausea, vomiting, headache, dizziness, abdominal pain. Labs and EKG were ordered and reviewed.  Imaging was ordered and reviewed by me.  Appropriate medications for patient's presenting complaints were ordered and effects were reassessed.  Additional history was obtained from mother at bedside.  Patient feels much better and is comfortable with discharge.  Appropriate follow-up was arranged. Mother and patient comfortable with plan. 37 yo female, PMHx of HTN, DM, Bipolar, GERD, from [employer], presenting with 3 days of intermittent chest pain, non-radiating, no alleviating or aggravating factors, associated with generalized weakness. She also notes she has been having several weeks of left ear pain. Per mother, patient sleeps on an uneven mattress and she complains of pain in her left neck that radiates to her ear and worse with movement, likely musculoskeletal. Denies fevers, chills, nausea, vomiting, headache, dizziness, abdominal pain. Labs and EKG were ordered and reviewed.  Imaging was ordered and reviewed by me.  Appropriate medications for patient's presenting complaints were ordered and effects were reassessed.  Additional history was obtained from mother at bedside.  Patient feels much better and is comfortable with discharge.  Appropriate follow-up was arranged. Mother and patient comfortable with plan.

## 2023-12-22 ENCOUNTER — APPOINTMENT (OUTPATIENT)
Dept: GASTROENTEROLOGY | Facility: CLINIC | Age: 38
End: 2023-12-22

## 2024-01-02 ENCOUNTER — NON-APPOINTMENT (OUTPATIENT)
Age: 39
End: 2024-01-02

## 2024-01-02 NOTE — DISCUSSION/SUMMARY
[FreeTextEntry1] : Left message to followup with the message patient left last week ,while writer was out on vacation, regarding possibility of patient going to the ER.

## 2024-01-04 ENCOUNTER — APPOINTMENT (OUTPATIENT)
Dept: PSYCHIATRY | Facility: CLINIC | Age: 39
End: 2024-01-04

## 2024-01-05 ENCOUNTER — NON-APPOINTMENT (OUTPATIENT)
Age: 39
End: 2024-01-05

## 2024-01-08 ENCOUNTER — APPOINTMENT (OUTPATIENT)
Dept: INTERNAL MEDICINE | Facility: CLINIC | Age: 39
End: 2024-01-08

## 2024-01-08 ENCOUNTER — APPOINTMENT (OUTPATIENT)
Dept: OBGYN | Facility: CLINIC | Age: 39
End: 2024-01-08
Payer: MEDICAID

## 2024-01-08 ENCOUNTER — RX RENEWAL (OUTPATIENT)
Age: 39
End: 2024-01-08

## 2024-01-08 DIAGNOSIS — N89.8 OTHER SPECIFIED NONINFLAMMATORY DISORDERS OF VAGINA: ICD-10-CM

## 2024-01-08 DIAGNOSIS — B37.31 ACUTE CANDIDIASIS OF VULVA AND VAGINA: ICD-10-CM

## 2024-01-08 DIAGNOSIS — Z86.19 PERSONAL HISTORY OF OTHER INFECTIOUS AND PARASITIC DISEASES: ICD-10-CM

## 2024-01-08 DIAGNOSIS — Z87.42 PERSONAL HISTORY OF OTHER DISEASES OF THE FEMALE GENITAL TRACT: ICD-10-CM

## 2024-01-08 DIAGNOSIS — N90.89 OTHER SPECIFIED NONINFLAMMATORY DISORDERS OF VULVA AND PERINEUM: ICD-10-CM

## 2024-01-08 LAB
BILIRUB UR QL STRIP: NORMAL
CLARITY UR: CLEAR
COLLECTION METHOD: NORMAL
GLUCOSE UR-MCNC: NORMAL
HCG UR QL: 0.2 EU/DL
HGB UR QL STRIP.AUTO: NORMAL
KETONES UR-MCNC: NORMAL
LEUKOCYTE ESTERASE UR QL STRIP: NORMAL
NITRITE UR QL STRIP: NORMAL
PH UR STRIP: 7
PROT UR STRIP-MCNC: NORMAL
SP GR UR STRIP: 1.01

## 2024-01-08 PROCEDURE — 99214 OFFICE O/P EST MOD 30 MIN: CPT

## 2024-01-08 PROCEDURE — 81003 URINALYSIS AUTO W/O SCOPE: CPT | Mod: QW

## 2024-01-08 RX ORDER — CLOTRIMAZOLE AND BETAMETHASONE DIPROPIONATE 10; .5 MG/G; MG/G
1-0.05 CREAM TOPICAL TWICE DAILY
Qty: 1 | Refills: 0 | Status: ACTIVE | COMMUNITY
Start: 2024-01-08 | End: 1900-01-01

## 2024-01-08 NOTE — HISTORY OF PRESENT ILLNESS
[FreeTextEntry1] : Patient is 38 years old para 0-0-0-0 last menstrual period December 24, 2023 She complains of vaginal discharge with vaginal and vulvar irritation/inflammation. Urine dip noted negative for leukocytes, negative for microscopic hematuria, positive for glucose.

## 2024-01-08 NOTE — PHYSICAL EXAM
[Chaperone Present] : A chaperone was present in the examining room during all aspects of the physical examination [Appropriately responsive] : appropriately responsive [Alert] : alert [No Acute Distress] : no acute distress [No Lymphadenopathy] : no lymphadenopathy [Regular Rate Rhythm] : regular rate rhythm [No Murmurs] : no murmurs [Clear to Auscultation B/L] : clear to auscultation bilaterally [Soft] : soft [Non-tender] : non-tender [Non-distended] : non-distended [No HSM] : No HSM [No Lesions] : no lesions [No Mass] : no mass [Oriented x3] : oriented x3 [Examination Of The Breasts] : a normal appearance [No Masses] : no breast masses were palpable [Labia Majora] : normal [Labia Minora] : normal [Normal] : normal [Uterine Adnexae] : normal [FreeTextEntry1] : Mary

## 2024-01-12 ENCOUNTER — APPOINTMENT (OUTPATIENT)
Dept: PSYCHIATRY | Facility: CLINIC | Age: 39
End: 2024-01-12

## 2024-01-12 ENCOUNTER — OUTPATIENT (OUTPATIENT)
Dept: OUTPATIENT SERVICES | Facility: HOSPITAL | Age: 39
LOS: 1 days | End: 2024-01-12
Payer: MEDICAID

## 2024-01-12 DIAGNOSIS — F41.1 GENERALIZED ANXIETY DISORDER: ICD-10-CM

## 2024-01-12 DIAGNOSIS — F41.0 PANIC DISORDER [EPISODIC PAROXYSMAL ANXIETY]: ICD-10-CM

## 2024-01-12 PROCEDURE — 90832 PSYTX W PT 30 MINUTES: CPT

## 2024-01-12 NOTE — PHYSICAL EXAM
[Cooperative] : cooperative [Euthymic] : euthymic [Full] : full [Clear] : clear [Linear/Goal Directed] : linear/goal directed [None] : none [None Reported] : none reported [Average] : average [WNL] : within normal limits [de-identified] : Fair

## 2024-01-12 NOTE — PLAN
[FreeTextEntry2] : Goal #1 " I want to feel better, and need help managing my anxiety and stress" \par  \par  Objective #1  Patient will learn assertive communication and be able to set boundaries and limits.  \par  Objective #2  Patient will learn and utilize positive coping, bereavement, stress management, and CBT/DBT methods. \par  \par    Goal #2  " I want to improve the quality of my life" \par  Obj#1   Patient will comply with all medical and psychiatric treatment recommendations" \par  Obj#2   Patient will participate in DBT skills group, other support groups and/or Programs. \par   \par   [[facility]] : [facility]  [Supportive Therapy] : Supportive Therapy [de-identified] : Dr. Curiel, attending on writer's team, joined session today to address issues that patient has been having, and request to have another psychiatrist. She reports that she does not feel that she and Dr. Coreas are a good fit. Addressed number of phone calls and messages that patient has left, and communication issue related to patient not receiving return phone calls due to poor internet service at her residence. She reports that this is also why she cannot do telehealth sessions. Patient reports that she does not want to see the psychiatrist at her residence which she saw before she came to this clinic. She was informed of that she can change psychiatrist at this clinic only once, but she would have to be transferred to a psychiatrist on another team, therefore also transfer to another therapist who is on her new psychiatrist's team. She reports that, although, she likes to work with , she cannot continue with Dr. Coreas. Addressed therapy goals. She reports that being able to talk about her problems and ways to manage has been helpful. Reinforced some progress made in taking ownership of her treatment by not having mother schedule her appointments and for her to join her session only if needed. Addressed expectations and limits related to phone calls to new psychiatrist and therapist.  She complained of feeling very tired and was feeling depressed during the holidays due to the loss of her father. She reports that her blood sugar continues to be high. She recognizes that her poor diet and not missing some therapy sessions are contributing factors to feeling worse. She also reports that her living environment at the adult home is also contributes to her mood disturbance She reports that she would like to eventually move out but needs to find a place for both her and her mother. She is open to having more services in place such as case management. She reports that her  at her residence has not been helpful or had time to meet with her, and she is still working with a  related to get social security disability.  [Recommended Frequency of Visits: ____] : Recommended frequency of visits: [unfilled] [Return in ____ week(s)] : Return in [unfilled] week(s) [FreeTextEntry1] : Patient will continue therapy session with writer until she is transferred to clinician on another team. She will continue medication regimen, use positive coping methods, and follow-up with therapy on 1/22.

## 2024-01-12 NOTE — REASON FOR VISIT
[Patient] : Patient [Other: _____] : [unfilled] [TextBox_17] : Dr. Curiel  [FreeTextEntry1] : Patient is a 38-year-old female seen for a follow-up therapy session, 2:30 PM to 3:10 PM

## 2024-01-13 DIAGNOSIS — F41.0 PANIC DISORDER [EPISODIC PAROXYSMAL ANXIETY]: ICD-10-CM

## 2024-01-13 DIAGNOSIS — F41.1 GENERALIZED ANXIETY DISORDER: ICD-10-CM

## 2024-01-15 NOTE — ED PROVIDER NOTE - ATTENDING CONTRIBUTION TO CARE
32 yo f with pmh of vulvodynia, iddm, gerd, spinal stenosis, sent in by her pain mgt doctor after pudendal nerve block.  pt had procedure for first time today.  says required multiple needle sticks, but had pain relief immediately after the procedure.  however, after 30 min, pt had increase in vulvar/labial pain.  dr. olson advised pt go to ED to have CT to r/o any post-procedural complication.  exam: nad, ncat, perrl, eomi, mmm, rrr, ctab, abd soft, nt,nd, pelvic exam as per malou villalobos, aox3, imp: pt with c/o vulvar pain s/p pudendal nerve block for the first time, labs and CT
Influenza    Influenza, more commonly known as "the flu," is a viral infection that primarily affects the respiratory tract. The respiratory tract includes organs that help you breathe, such as the lungs, nose, and throat. The flu causes many common cold symptoms, as well as a high fever and body aches.     The flu spreads easily from person to person (is contagious). Getting a flu shot (influenza vaccination) every year is the best way to prevent influenza.    CAUSES  Influenza is caused by a virus. You can catch the virus by:    Breathing in droplets from an infected person's cough or sneeze.  Touching something that was recently contaminated with the virus and then touching your mouth, nose, or eyes.    RISK FACTORS  The following factors may make you more likely to get the flu:    Not cleaning your hands frequently with soap and water or alcohol-based hand .  Having close contact with many people during cold and flu season.  Touching your mouth, eyes, or nose without washing or sanitizing your hands first.  Not drinking enough fluids or not eating a healthy diet.  Not getting enough sleep or exercise.  Being under a high amount of stress.  Not getting a yearly (annual) flu shot.    You may be at a higher risk of complications from the flu, such as a severe lung infection (pneumonia), if you:    Are over the age of 65.  Are pregnant.  Have a weakened disease-fighting system (immune system). You may have a weakened immune system if you:  Have HIV or AIDS.  Are undergoing chemotherapy.  Are taking medicines that reduce the activity of (suppress) the immune system.  Have a long-term (chronic) illness, such as heart disease, kidney disease, diabetes, or lung disease.  Have a liver disorder.  Are obese.  Have anemia.    SYMPTOMS  Symptoms of this condition typically last 4–10 days and may include:    Fever.  Chills.  Headache, body aches, or muscle aches.  Sore throat.  Cough.  Runny or congested nose.  Chest discomfort and cough.  Poor appetite.  Weakness or tiredness (fatigue).  Dizziness.  Nausea or vomiting.    DIAGNOSIS  This condition may be diagnosed based on your medical history and a physical exam. Your health care provider may do a nose or throat swab test to confirm the diagnosis.    TREATMENT  If influenza is detected early, you can be treated with antiviral medicine that can reduce the length of your illness and the severity of your symptoms. This medicine may be given by mouth (orally) or through an IV tube that is inserted in one of your veins.    The goal of treatment is to relieve symptoms by taking care of yourself at home. This may include taking over-the-counter medicines, drinking plenty of fluids, and adding humidity to the air in your home.    In some cases, influenza goes away on its own. Severe influenza or complications from influenza may be treated in a hospital.     HOME CARE INSTRUCTIONS  Take over-the-counter and prescription medicines only as told by your health care provider.  Use a cool mist humidifier to add humidity to the air in your home. This can make breathing easier.  Rest as needed.  Drink enough fluid to keep your urine clear or pale yellow.  Cover your mouth and nose when you cough or sneeze.  Wash your hands with soap and water often, especially after you cough or sneeze. If soap and water are not available, use hand .  Stay home from work or school as told by your health care provider. Unless you are visiting your health care provider, try to avoid leaving home until your fever has been gone for 24 hours without the use of medicine.  Keep all follow-up visits as told by your health care provider. This is important.    PREVENTION  Getting an annual flu shot is the best way to avoid getting the flu. You may get the flu shot in late summer, fall, or winter. Ask your health care provider when you should get your flu shot.  Wash your hands often or use hand  often.  Avoid contact with people who are sick during cold and flu season.  Eat a healthy diet, drink plenty of fluids, get enough sleep, and exercise regularly.    SEEK MEDICAL CARE IF:  You develop new symptoms.  You have:  Chest pain.  Diarrhea.  A fever.  Your cough gets worse.  You produce more mucus.  You feel nauseous or you vomit.    SEEK IMMEDIATE MEDICAL CARE IF:  You develop shortness of breath or difficulty breathing.  Your skin or nails turn a bluish color.  You have severe pain or stiffness in your neck.  You develop a sudden headache or sudden pain in your face or ear.  You cannot stop vomiting.    ADDITIONAL NOTES AND INSTRUCTIONS    Please follow up with your Primary MD in 24-48 hr.  Seek immediate medical care for any new/worsening signs or symptoms.

## 2024-01-17 ENCOUNTER — APPOINTMENT (OUTPATIENT)
Dept: SLEEP CENTER | Facility: HOSPITAL | Age: 39
End: 2024-01-17

## 2024-01-19 ENCOUNTER — NON-APPOINTMENT (OUTPATIENT)
Age: 39
End: 2024-01-19

## 2024-01-19 ENCOUNTER — EMERGENCY (EMERGENCY)
Facility: HOSPITAL | Age: 39
LOS: 0 days | Discharge: HOME | End: 2024-01-19
Attending: EMERGENCY MEDICINE
Payer: MEDICAID

## 2024-01-19 VITALS
RESPIRATION RATE: 18 BRPM | DIASTOLIC BLOOD PRESSURE: 67 MMHG | HEIGHT: 64 IN | OXYGEN SATURATION: 98 % | HEART RATE: 90 BPM | TEMPERATURE: 98 F | SYSTOLIC BLOOD PRESSURE: 126 MMHG

## 2024-01-19 DIAGNOSIS — R52 PAIN, UNSPECIFIED: ICD-10-CM

## 2024-01-19 DIAGNOSIS — U07.1 COVID-19: ICD-10-CM

## 2024-01-19 DIAGNOSIS — Z88.7 ALLERGY STATUS TO SERUM AND VACCINE: ICD-10-CM

## 2024-01-19 DIAGNOSIS — R05.9 COUGH, UNSPECIFIED: ICD-10-CM

## 2024-01-19 DIAGNOSIS — Z88.0 ALLERGY STATUS TO PENICILLIN: ICD-10-CM

## 2024-01-19 DIAGNOSIS — R53.1 WEAKNESS: ICD-10-CM

## 2024-01-19 DIAGNOSIS — Z88.1 ALLERGY STATUS TO OTHER ANTIBIOTIC AGENTS STATUS: ICD-10-CM

## 2024-01-19 LAB
FLUAV AG NPH QL: SIGNIFICANT CHANGE UP
FLUBV AG NPH QL: SIGNIFICANT CHANGE UP
RSV RNA NPH QL NAA+NON-PROBE: SIGNIFICANT CHANGE UP
SARS-COV-2 RNA SPEC QL NAA+PROBE: DETECTED

## 2024-01-19 PROCEDURE — 0241U: CPT

## 2024-01-19 PROCEDURE — 96372 THER/PROPH/DIAG INJ SC/IM: CPT

## 2024-01-19 PROCEDURE — 99284 EMERGENCY DEPT VISIT MOD MDM: CPT

## 2024-01-19 PROCEDURE — 99283 EMERGENCY DEPT VISIT LOW MDM: CPT | Mod: 25

## 2024-01-19 RX ORDER — KETOROLAC TROMETHAMINE 30 MG/ML
30 SYRINGE (ML) INJECTION ONCE
Refills: 0 | Status: DISCONTINUED | OUTPATIENT
Start: 2024-01-19 | End: 2024-01-19

## 2024-01-19 RX ADMIN — Medication 30 MILLIGRAM(S): at 11:28

## 2024-01-19 NOTE — ED PROVIDER NOTE - OBJECTIVE STATEMENT
37 yo female presents c/o cough/weakness/body aches for 3 days. pt denies any other symptoms including fevers, chill, headache, recent illness/travel, abdominal pain, chest pain.

## 2024-01-19 NOTE — ED PROVIDER NOTE - CLINICAL SUMMARY MEDICAL DECISION MAKING FREE TEXT BOX
38-year-old female presenting to ED for a COVID test.  Patient has mild URI-like symptoms.  Her vital signs were reviewed and appear normal.  Patient appears well, exam is reassuring.  Prior records were reviewed, collateral information obtained from the patient's mother.  COVID swab was sent as requested, patient stable for discharge back to her place of residence.  Advised to follow-up with her PCP, strict return precautions given.  Both patient and her mom were given upon discharge questions, verbalized understanding and are amenable to discharge plan.

## 2024-01-19 NOTE — ED PROVIDER NOTE - PHYSICAL EXAMINATION
Gen: NAD, AOx3  Head: NCAT  HEENT: PERRL, oral mucosa moist, normal conjunctiva, oropharynx clear without exudate  Lung: CTAB, no respiratory distress, no wheezing, rales, rhonchi  CV: normal s1/s2, rrr, Normal perfusion, pulses 2+ throughout  Abd: soft, NTND, no CVA tenderness  Genitourinary: no pelvic tenderness  MSK: No edema, no visible deformities, full range of motion in all 4 extremities  Neuro: CN II-XII grossly intact, No focal neurologic deficits  Skin: No rash   Psych: normal affect

## 2024-01-19 NOTE — ED PROVIDER NOTE - NSDCPRINTRESULTS_ED_ALL_ED
Statement Selected
Patient requests all Lab, Cardiology, and Radiology Results on their Discharge Instructions

## 2024-01-19 NOTE — ED PROVIDER NOTE - ATTENDING APP SHARED VISIT CONTRIBUTION OF CARE
38-year-old female past med history of CELINE, spinal stenosis, DM, GERD, neuropathy presented from [facility]'s residence for a COVID test.  Patient states that she developed URI-like symptoms several days ago, did  home COVID test which was positive.  The facility reportedly requires an official test so that patient can be quarantined.  Denies any shortness of breath, hemoptysis, diarrhea, chest pain or any other additional acute complaints. Well-appearing female in no acute distress, PERRL, pink conjunctiva, MMM, supple neck, speaking full sentences, lungs clear to auscultation bilaterally, equal air entry, RRR, well-perfused extremities, abdomen is rotund soft and nontender to palpation, no midline  spine or CVA TTP, patient is awake and alert, normal mood and affect.  Plan: COVID swab, DC back to place of residence.  Strict return precautions given

## 2024-01-19 NOTE — ED PROVIDER NOTE - HIV OFFER
Patient here for Synagis injection #: 5  Patient weighed naked with no diaper. 8.4 kg  No Fever  NKA/allergies verified. Patient given 15mg/kg of Synagis - Dose given: 1.3 ml  Patient tolerated well,   No adverse reactions noted in office.   Patient left o
Previously Declined (within the last year)

## 2024-01-22 ENCOUNTER — APPOINTMENT (OUTPATIENT)
Dept: PSYCHIATRY | Facility: CLINIC | Age: 39
End: 2024-01-22

## 2024-01-24 ENCOUNTER — APPOINTMENT (OUTPATIENT)
Dept: OPHTHALMOLOGY | Facility: CLINIC | Age: 39
End: 2024-01-24

## 2024-01-25 ENCOUNTER — NON-APPOINTMENT (OUTPATIENT)
Age: 39
End: 2024-01-25

## 2024-01-26 ENCOUNTER — RX RENEWAL (OUTPATIENT)
Age: 39
End: 2024-01-26

## 2024-02-01 ENCOUNTER — EMERGENCY (EMERGENCY)
Facility: HOSPITAL | Age: 39
LOS: 0 days | Discharge: HOME | End: 2024-02-01
Attending: EMERGENCY MEDICINE
Payer: MEDICAID

## 2024-02-01 VITALS
OXYGEN SATURATION: 97 % | SYSTOLIC BLOOD PRESSURE: 138 MMHG | HEART RATE: 93 BPM | TEMPERATURE: 98 F | DIASTOLIC BLOOD PRESSURE: 84 MMHG | RESPIRATION RATE: 18 BRPM

## 2024-02-01 VITALS — HEIGHT: 64 IN | DIASTOLIC BLOOD PRESSURE: 84 MMHG | SYSTOLIC BLOOD PRESSURE: 138 MMHG | WEIGHT: 205.03 LBS

## 2024-02-01 DIAGNOSIS — Z88.0 ALLERGY STATUS TO PENICILLIN: ICD-10-CM

## 2024-02-01 DIAGNOSIS — Z88.1 ALLERGY STATUS TO OTHER ANTIBIOTIC AGENTS STATUS: ICD-10-CM

## 2024-02-01 DIAGNOSIS — R05.1 ACUTE COUGH: ICD-10-CM

## 2024-02-01 DIAGNOSIS — R09.81 NASAL CONGESTION: ICD-10-CM

## 2024-02-01 DIAGNOSIS — H92.02 OTALGIA, LEFT EAR: ICD-10-CM

## 2024-02-01 DIAGNOSIS — R79.89 OTHER SPECIFIED ABNORMAL FINDINGS OF BLOOD CHEMISTRY: ICD-10-CM

## 2024-02-01 DIAGNOSIS — Z88.7 ALLERGY STATUS TO SERUM AND VACCINE: ICD-10-CM

## 2024-02-01 DIAGNOSIS — J06.9 ACUTE UPPER RESPIRATORY INFECTION, UNSPECIFIED: ICD-10-CM

## 2024-02-01 DIAGNOSIS — R07.89 OTHER CHEST PAIN: ICD-10-CM

## 2024-02-01 DIAGNOSIS — Z88.8 ALLERGY STATUS TO OTHER DRUGS, MEDICAMENTS AND BIOLOGICAL SUBSTANCES: ICD-10-CM

## 2024-02-01 DIAGNOSIS — K76.0 FATTY (CHANGE OF) LIVER, NOT ELSEWHERE CLASSIFIED: ICD-10-CM

## 2024-02-01 LAB
ALBUMIN SERPL ELPH-MCNC: 3.9 G/DL — SIGNIFICANT CHANGE UP (ref 3.5–5.2)
ALP SERPL-CCNC: 176 U/L — HIGH (ref 30–115)
ALT FLD-CCNC: 96 U/L — HIGH (ref 0–41)
ANION GAP SERPL CALC-SCNC: 7 MMOL/L — SIGNIFICANT CHANGE UP (ref 7–14)
AST SERPL-CCNC: 39 U/L — SIGNIFICANT CHANGE UP (ref 0–41)
BASOPHILS # BLD AUTO: 0.06 K/UL — SIGNIFICANT CHANGE UP (ref 0–0.2)
BASOPHILS NFR BLD AUTO: 0.6 % — SIGNIFICANT CHANGE UP (ref 0–1)
BILIRUB SERPL-MCNC: 0.2 MG/DL — SIGNIFICANT CHANGE UP (ref 0.2–1.2)
BUN SERPL-MCNC: 10 MG/DL — SIGNIFICANT CHANGE UP (ref 10–20)
CALCIUM SERPL-MCNC: 9 MG/DL — SIGNIFICANT CHANGE UP (ref 8.4–10.5)
CHLORIDE SERPL-SCNC: 96 MMOL/L — LOW (ref 98–110)
CO2 SERPL-SCNC: 28 MMOL/L — SIGNIFICANT CHANGE UP (ref 17–32)
CREAT SERPL-MCNC: 0.7 MG/DL — SIGNIFICANT CHANGE UP (ref 0.7–1.5)
EGFR: 113 ML/MIN/1.73M2 — SIGNIFICANT CHANGE UP
EOSINOPHIL # BLD AUTO: 0.12 K/UL — SIGNIFICANT CHANGE UP (ref 0–0.7)
EOSINOPHIL NFR BLD AUTO: 1.2 % — SIGNIFICANT CHANGE UP (ref 0–8)
GLUCOSE SERPL-MCNC: 342 MG/DL — HIGH (ref 70–99)
HCG SERPL QL: NEGATIVE — SIGNIFICANT CHANGE UP
HCT VFR BLD CALC: 40.1 % — SIGNIFICANT CHANGE UP (ref 37–47)
HGB BLD-MCNC: 12.9 G/DL — SIGNIFICANT CHANGE UP (ref 12–16)
IMM GRANULOCYTES NFR BLD AUTO: 0.9 % — HIGH (ref 0.1–0.3)
LYMPHOCYTES # BLD AUTO: 2.06 K/UL — SIGNIFICANT CHANGE UP (ref 1.2–3.4)
LYMPHOCYTES # BLD AUTO: 20.6 % — SIGNIFICANT CHANGE UP (ref 20.5–51.1)
MCHC RBC-ENTMCNC: 27.2 PG — SIGNIFICANT CHANGE UP (ref 27–31)
MCHC RBC-ENTMCNC: 32.2 G/DL — SIGNIFICANT CHANGE UP (ref 32–37)
MCV RBC AUTO: 84.6 FL — SIGNIFICANT CHANGE UP (ref 81–99)
MONOCYTES # BLD AUTO: 0.67 K/UL — HIGH (ref 0.1–0.6)
MONOCYTES NFR BLD AUTO: 6.7 % — SIGNIFICANT CHANGE UP (ref 1.7–9.3)
NEUTROPHILS # BLD AUTO: 6.99 K/UL — HIGH (ref 1.4–6.5)
NEUTROPHILS NFR BLD AUTO: 70 % — SIGNIFICANT CHANGE UP (ref 42.2–75.2)
NRBC # BLD: 0 /100 WBCS — SIGNIFICANT CHANGE UP (ref 0–0)
PLATELET # BLD AUTO: 415 K/UL — HIGH (ref 130–400)
PMV BLD: 10.2 FL — SIGNIFICANT CHANGE UP (ref 7.4–10.4)
POTASSIUM SERPL-MCNC: 5 MMOL/L — SIGNIFICANT CHANGE UP (ref 3.5–5)
POTASSIUM SERPL-SCNC: 5 MMOL/L — SIGNIFICANT CHANGE UP (ref 3.5–5)
PROT SERPL-MCNC: 7 G/DL — SIGNIFICANT CHANGE UP (ref 6–8)
RBC # BLD: 4.74 M/UL — SIGNIFICANT CHANGE UP (ref 4.2–5.4)
RBC # FLD: 13.5 % — SIGNIFICANT CHANGE UP (ref 11.5–14.5)
SODIUM SERPL-SCNC: 131 MMOL/L — LOW (ref 135–146)
WBC # BLD: 9.99 K/UL — SIGNIFICANT CHANGE UP (ref 4.8–10.8)
WBC # FLD AUTO: 9.99 K/UL — SIGNIFICANT CHANGE UP (ref 4.8–10.8)

## 2024-02-01 PROCEDURE — 36415 COLL VENOUS BLD VENIPUNCTURE: CPT

## 2024-02-01 PROCEDURE — 96374 THER/PROPH/DIAG INJ IV PUSH: CPT

## 2024-02-01 PROCEDURE — 93010 ELECTROCARDIOGRAM REPORT: CPT

## 2024-02-01 PROCEDURE — 99285 EMERGENCY DEPT VISIT HI MDM: CPT | Mod: 25

## 2024-02-01 PROCEDURE — 84703 CHORIONIC GONADOTROPIN ASSAY: CPT

## 2024-02-01 PROCEDURE — 99284 EMERGENCY DEPT VISIT MOD MDM: CPT

## 2024-02-01 PROCEDURE — 71046 X-RAY EXAM CHEST 2 VIEWS: CPT

## 2024-02-01 PROCEDURE — 71046 X-RAY EXAM CHEST 2 VIEWS: CPT | Mod: 26

## 2024-02-01 PROCEDURE — 93005 ELECTROCARDIOGRAM TRACING: CPT

## 2024-02-01 PROCEDURE — 85025 COMPLETE CBC W/AUTO DIFF WBC: CPT

## 2024-02-01 PROCEDURE — 80053 COMPREHEN METABOLIC PANEL: CPT

## 2024-02-01 RX ORDER — KETOROLAC TROMETHAMINE 30 MG/ML
15 SYRINGE (ML) INJECTION ONCE
Refills: 0 | Status: DISCONTINUED | OUTPATIENT
Start: 2024-02-01 | End: 2024-02-01

## 2024-02-01 RX ORDER — SODIUM CHLORIDE 0.65 %
1 AEROSOL, SPRAY (ML) NASAL
Qty: 1 | Refills: 0
Start: 2024-02-01

## 2024-02-01 RX ORDER — SODIUM CHLORIDE 9 MG/ML
1000 INJECTION, SOLUTION INTRAVENOUS ONCE
Refills: 0 | Status: COMPLETED | OUTPATIENT
Start: 2024-02-01 | End: 2024-02-01

## 2024-02-01 RX ADMIN — Medication 15 MILLIGRAM(S): at 12:09

## 2024-02-01 RX ADMIN — SODIUM CHLORIDE 1000 MILLILITER(S): 9 INJECTION, SOLUTION INTRAVENOUS at 12:09

## 2024-02-01 NOTE — ED PROVIDER NOTE - OBJECTIVE STATEMENT
37yo female PMHx as listed presenting with congestion and cough x2 weeks in the context of Covid infection that began with subjective fevers and body aches. No f/c in several days, has been having no v/d/ap/us. Cough is non productive, no chest pain. Pt also c/o "aching" to the left ear.

## 2024-02-01 NOTE — ED PROVIDER NOTE - PATIENT PORTAL LINK FT
You can access the FollowMyHealth Patient Portal offered by [facility] by registering at the following website: [email or web address]. By joining [facility]’s FollowMyHealth portal, you will also be able to view your health information using other applications (apps) compatible with our system.

## 2024-02-01 NOTE — ED PROVIDER NOTE - NSFOLLOWUPINSTRUCTIONS_ED_ALL_ED_FT
Follow-up with Dr Robertson later this week or first thing next week for repeat examination.       COVID-19 (Coronavirus Disease 2019)    WHAT YOU NEED TO KNOW:    Coronavirus disease 2019 (COVID-19) is the disease caused by a coronavirus first discovered in 2019. Coronaviruses generally cause upper respiratory (nose, throat, and lung) infections, such as a cold. The new virus spreads quickly and easily. The virus can be spread starting 2 days before symptoms even begin. The virus has also changed into several new forms (called variants) since it was discovered. The variants may be more contagious (easily spread) than the original form. Some may also cause more severe illness than others. It is important to follow local, national, and worldwide measures to protect yourself and others from infection.    DISCHARGE INSTRUCTIONS:    If you think you or someone you know may be infected: Do the following to protect others:   •If emergency care is needed, tell the  about the possible infection, or call ahead and tell the emergency department.      •Call a healthcare provider for instructions if symptoms are mild. Anyone who may be infected should not arrive without calling first. The provider will need to protect staff members and other patients.      •The person who may be infected needs to wear a face covering while getting medical care. This will help lower the risk of infecting others. Coverings are not used for anyone who is younger than 2 years, has breathing problems, or cannot remove it. The provider can give you instructions for anyone who cannot wear a covering.      Call your local emergency number (911 in the [place]) or an emergency department if:   •You have trouble breathing or shortness of breath at rest.      •You have chest pain or pressure that lasts longer than 5 minutes.      •You become confused or hard to wake.      •Your lips or face are blue.      •You have a fever of 104°F (40°C) or higher.      Call your doctor if:   •You do not have symptoms of COVID-19 but had close physical contact within 14 days with someone who tested positive.      •You have questions or concerns about your condition or care.      Medicines: You may need any of the following:   •Decongestants help reduce nasal congestion and help you breathe more easily. If you take decongestant pills, they may make you feel restless or cause problems with your sleep. Do not use decongestant sprays for more than a few days.      •Cough suppressants help reduce coughing. Ask your healthcare provider which type of cough medicine is best for you.      •Acetaminophen decreases pain and fever. It is available without a doctor's order. Ask how much to take and how often to take it. Follow directions. Read the labels of all other medicines you are using to see if they also contain acetaminophen, or ask your doctor or pharmacist. Acetaminophen can cause liver damage if not taken correctly. Do not use more than 4 grams (4,000 milligrams) total of acetaminophen in one day.       •NSAIDs, such as ibuprofen, help decrease swelling, pain, and fever. NSAIDs can cause stomach bleeding or kidney problems in certain people. If you take blood thinner medicine, always ask your healthcare provider if NSAIDs are safe for you. Always read the medicine label and follow directions.      •Take your medicine as directed. Contact your healthcare provider if you think your medicine is not helping or if you have side effects. Tell him or her if you are allergic to any medicine. Keep a list of the medicines, vitamins, and herbs you take. Include the amounts, and when and why you take them. Bring the list or the pill bottles to follow-up visits. Carry your medicine list with you in case of an emergency.      How the 2019 coronavirus spreads: The following are ways the virus is thought to spread, but more information may be coming:   •Droplets are the main way all coronaviruses spread. The virus travels in droplets that form when a person talks, coughs, or sneezes. The droplets can also float in the air for minutes or hours. Infection happens when you breathe in the droplets or get them in your eyes or nose. Close personal contact with an infected person increases your risk for infection. This means being within 6 feet (2 meters) of the person for at least 15 minutes over 24 hours.      •Person-to-person contact can spread the virus. For example, a person with the virus on his or her hands can spread it by shaking hands with someone.      •The virus can stay on objects and surfaces for a short time. You may become infected by touching the object or surface and then touching your eyes or mouth.      •An infected animal may be able to infect a person who touches it. This may happen at live markets or on a farm.      Help lower the risk for COVID-19: The best way to prevent infection is to avoid anyone who is infected, but this can be hard to do. An infected person can spread the virus before signs or symptoms begin, or even if signs or symptoms never develop. The following can help lower the risk for infection:     Prevent COVID-19 Infection     •Wash your hands often throughout the day. Use soap and water. Rub your soapy hands together, lacing your fingers, for at least 20 seconds. Rinse with warm, running water. Dry your hands with a clean towel or paper towel. Use hand  that contains alcohol if soap and water are not available. Teach children how to wash their hands and use hand .  Handwashing           •Cover sneezes and coughs. Turn your face away and cover your mouth and nose with a tissue. Throw the tissue away. Use the bend of your arm if a tissue is not available. Then wash your hands well with soap and water or use hand . Teach children how to cover a cough or sneeze.      •Wear a face covering (mask) around anyone who does not live in your home. Use a cloth covering with at least 2 layers. You can also create layers by putting a cloth covering over a disposable non-medical mask. Cover your mouth and your nose. The covering should fit snugly against the bridge of your nose. Securely fasten it under your chin and on the sides of your face. Do not wear a plastic face shield instead of a covering. Continue social distancing and washing your hands often. A face covering is not a substitute for social distancing safety measures.  How to Wear a Face Covering (Mask)           •Follow worldwide, national, and local social distancing guidelines. Keep at least 6 feet (2 meters) between you and others. Also keep this distance from anyone who comes to your home, such as someone making a delivery. Wear a face covering while you are around others. You will need to wear a covering in restaurants, stores, and other public buildings. You will also need a covering on mass transit, such as a bus, subway, or airplane. Remember to use a covering made from thick material or wear 2 coverings together.      •Make a habit of not touching your face. If you get the virus on your hands, you can transfer it to your eyes, nose, or mouth and become infected. You can also transfer it to objects, surfaces, or people. Do not touch your eyes, nose, or mouth without washing your hands first.      •Clean and disinfect high-touch surfaces and objects often. Use disinfecting wipes, or make a solution of 4 teaspoons of bleach in 1 quart (4 cups) of water. Clean and disinfect even if you think no one living in or coming to your home is infected with the virus.      •Ask about vaccines you may need. The COVID-19 vaccine is a shot given to help prevent infection caused by the novel coronavirus. Your healthcare provider can give you more information about when a vaccine may be available to you. Get the influenza (flu) vaccine as soon as recommended each year, usually starting in September or October. Get the pneumonia vaccine if recommended.      Follow social distancing guidelines: National and local social distancing rules vary. Rules may change over time as restrictions are lifted. Restrictions may return if an outbreak happens where you live. It is important to know and follow all current social distancing rules in your area. The following are general guidelines:  •Stay home if you are sick or think you may have COVID-19. It is important to stay home if you are waiting for a testing appointment or for test results. Even if you do not have symptoms, you can pass the virus to others.      •Limit trips out of your home. Have food, medicines, and other supplies delivered and left at your door or other area, if possible. Plan trips out of your home so you make the fewest stops possible to limit close personal contact. Keep track of places you go. This will help contact tracers notify others if you become infected.      •Avoid close physical contact with anyone who does not live in your home. Do not shake hands with, hug, or kiss a person as a greeting. If you must use public transportation (such as a bus or subway), try to sit or stand away from others. Only allow necessary people into your home. Wear your face covering, and remind others to wear a face covering. Remind them to wash their hands when they arrive and before they leave. Do not let someone into your home or go to someone's home just to visit. Even if you both do not feel sick, the virus can pass from one of you to the other.      •Avoid in-person gatherings and crowds. Gatherings or crowds of 10 or more individuals can cause the virus to spread. Avoid places such as vences, beaches, sporting events, and tourist attractions. For events such as parties, holiday meals, [employer] services, and conferences, attend virtually (on a computer), if possible.      •Ask your healthcare provider for other ways to have appointments. Some providers offer phone, video, or other types of appointments. You may also be able to get prescriptions for a few months of your medicines at a time.      •Stay safe if you must go out to work. Keep physical distance between you and other workers as much as possible. Follow your employer's rules so everyone stays safe.      If you have COVID-19 and are recovering at home, healthcare providers will give you specific instructions to follow. The following are general guidelines to remind you how to keep others safe until you are well:   •Wash your hands often. Use soap and water as much as possible. Use hand  that contains alcohol if soap and water are not available. Dry your hands with a clean towel or paper towel. Do not share towels with anyone. If you use paper towels, throw them away in a lined trash can kept in your room or area. Use a covered trash can, if possible.      •Do not go out of your home unless it is necessary. Ask someone who is not infected to go out for groceries or supplies, or have them delivered. Do not go to your healthcare provider's office without an appointment.      •Only have close physical contact with a person giving direct care, or a baby or child you must care for. Family members and friends should not visit you. If possible, stay in a separate area or room of your home if you live with others. No one should go into the area or room except to give you care. You can visit with others by phone, video chat, e-mail, or similar systems.      •Wear a face covering while others are near you. This can help prevent droplets from spreading the virus when you talk, sneeze, or cough. Put the covering on before anyone comes into your room or area. Remind the person to cover his or her nose and mouth before coming in to provide care for you.      •Do not share items. Do not share dishes, towels, or other items with anyone. Items need to be washed after you use them.      •Protect your baby. Some newborns have tested positive for the virus. It is not known if they became infected before or after birth. The highest risk is when a  has close contact with an infected person. If you are pregnant or breastfeeding, talk to your healthcare provider or obstetrician about any concerns you have. He or she will tell you when to bring your baby in for check-ups and vaccines. He or she will also tell you what to do if you think your baby was infected with the coronavirus. Wash your hands and put on a clean face covering before you breastfeed or care for your baby.      •Do not handle live animals unless it is necessary. Some animals, including pets, have been infected with the new coronavirus. Ask someone who is not infected to take care of your pet until you are well. If you must care for a pet, wear a face covering. Wash your hands before and after you give care. Talk to your healthcare provider about how to keep a service animal safe, if needed.      •Follow directions from your healthcare provider for being around others after you recover. It is not known if or for how long a recovered person can pass the virus to others. Your provider may give you instructions, such as continuing social distancing and wearing a face covering. He or she will tell you when it is okay to be around others again. This may be 10 to 20 days after symptoms started or you had a positive test. Most symptoms will also need to be gone. Your provider will give you more information.      Follow up with your doctor as directed: Write down your questions so you remember to ask them during your visits.    For more information:   •Centers for Disease Control and Prevention  [address] [place] [address]  Phone: 1-864.300.1368  Web Address: http://www.cdc.gov

## 2024-02-01 NOTE — ED ADULT NURSE NOTE - CHIEF COMPLAINT QUOTE
Arrived EMS for [place] c/o intermittent chest pain x 1 week and dizzines . Pt states she had covid on 1/19 and chest pain became worse since. Pt states" I just don't feel well". EKG performed

## 2024-02-01 NOTE — ED PROVIDER NOTE - PROGRESS NOTE DETAILS
MINA: pt feeling improved following meds, return preacutions d/w pt and mother bedside, hoem care also discussed.

## 2024-02-01 NOTE — ED PROVIDER NOTE - PHYSICAL EXAMINATION
VITAL SIGNS: I have reviewed nursing notes and confirm.  CONSTITUTIONAL: Well-appearing, non-toxic, in NAD  SKIN: Warm dry, normal skin turgor  HEAD: NCAT  EYES: No conjunctival injection, scleral anicteric  ENT: Moist mucous membranes, normal pharynx with no erythema or exudates. B/l ears w/o effusion, normal light reflex. Mild erythema of left TM.   NECK: Supple; full ROM. Nontender. No cervical LAD  CARD: RRR, no murmurs, rubs or gallops  RESP: Clear to ausculation bilaterally.  No rales, rhonchi, or wheezing. + occasional dry cough.  ABD: Soft, non-distended, non-tender, no rebound or guarding. No CVA tenderness  EXT: Full ROM, no bony tenderness, no pedal edema, no calf tenderness  NEURO: Normal motor, normal sensory. CN II-XII grossly intact. Normal gait.  PSYCH: Cooperative, appropriate.

## 2024-02-01 NOTE — ED ADULT TRIAGE NOTE - PRO INTERPRETER NEED 2
English
You can access the [facility] Patient Portal, offered by [facility], by registering with the following website: [email or web address]

## 2024-02-01 NOTE — ED PROVIDER NOTE - CLINICAL SUMMARY MEDICAL DECISION MAKING FREE TEXT BOX
38-year-old female here for evaluation of cough congestion.  Patient with diagnosed COVID on January 19 since then having persistent cough congestion.  She has chest pain with coughing but no fever chills currently.  Patient also describes that she generally feels unwell.  CON: ao x 3, HENMT: neck supple,  CV: s1s2 ctab, RESP: cta b/l, GI:  soft, nontender, no rebound, no guarding, SKIN: no rash, MSK: no deformities, NEURO: no gross motor or sensory deficit Psychiatric: appropriate mood, appropriate affect  Impression  Patient here with cough congestion atypical sounding chest pain.  Here exam is unremarkable screening x-ray within normal limits EKG normal sinus rhythm without evidence of ischemia.  Patient stable for discharge.  Of note patient with mild elevated LFTs made aware of this but family states that she has fatty liver and has had previous elevations.    Independent interpretation of the Xray film(s) performed by: Dr. Ashvin Greenfield: JULIANNE

## 2024-02-01 NOTE — ED PROVIDER NOTE - DIFFERENTIAL DIAGNOSIS
ACS, PE, pneumonia, arrhythmia, interstitial lung disease, anemia, congestive heart failure, anaphylaxis, pleural effusion, bronchiectasis, pneumothorax, COPD, reactive airway disease, Asthma,  viral uri, valvular heart disease, pneumonitis, anxiety acute coronary syndrome, Stable angina , Pneumonia Viral pleuritis.  GERD.Costochondritis.Anxiety or panic disorder, Aortic dissection, myocarditis, pericarditis, zoster Differential Diagnosis

## 2024-02-07 ENCOUNTER — APPOINTMENT (OUTPATIENT)
Dept: PSYCHIATRY | Facility: CLINIC | Age: 39
End: 2024-02-07

## 2024-02-12 ENCOUNTER — OUTPATIENT (OUTPATIENT)
Dept: OUTPATIENT SERVICES | Facility: HOSPITAL | Age: 39
LOS: 1 days | End: 2024-02-12
Payer: MEDICAID

## 2024-02-12 ENCOUNTER — APPOINTMENT (OUTPATIENT)
Dept: INTERNAL MEDICINE | Facility: CLINIC | Age: 39
End: 2024-02-12
Payer: MEDICAID

## 2024-02-12 VITALS
BODY MASS INDEX: 34.31 KG/M2 | WEIGHT: 201 LBS | TEMPERATURE: 96.9 F | SYSTOLIC BLOOD PRESSURE: 137 MMHG | DIASTOLIC BLOOD PRESSURE: 76 MMHG | OXYGEN SATURATION: 98 % | HEIGHT: 64 IN | HEART RATE: 97 BPM

## 2024-02-12 DIAGNOSIS — Z00.00 ENCOUNTER FOR GENERAL ADULT MEDICAL EXAMINATION WITHOUT ABNORMAL FINDINGS: ICD-10-CM

## 2024-02-12 DIAGNOSIS — U09.9 CHRONIC COUGH: ICD-10-CM

## 2024-02-12 DIAGNOSIS — R05.3 CHRONIC COUGH: ICD-10-CM

## 2024-02-12 PROCEDURE — 99214 OFFICE O/P EST MOD 30 MIN: CPT

## 2024-02-12 RX ORDER — CYCLOBENZAPRINE HYDROCHLORIDE 5 MG/1
5 TABLET, FILM COATED ORAL 3 TIMES DAILY
Qty: 30 | Refills: 0 | Status: DISCONTINUED | COMMUNITY
Start: 2023-12-06 | End: 2024-02-12

## 2024-02-12 RX ORDER — TERCONAZOLE 4 MG/G
0.4 CREAM VAGINAL
Qty: 1 | Refills: 2 | Status: DISCONTINUED | COMMUNITY
Start: 2024-01-08 | End: 2024-02-12

## 2024-02-12 RX ORDER — NAPROXEN 500 MG/1
500 TABLET ORAL
Qty: 30 | Refills: 0 | Status: DISCONTINUED | COMMUNITY
Start: 2023-12-06 | End: 2024-02-12

## 2024-02-12 RX ORDER — DEXTROMETHORPHAN HYDROBROMIDE AND GUAIFENESIN 10; 200 MG/1; MG/1
10-200 CAPSULE, LIQUID FILLED ORAL
Qty: 180 | Refills: 0 | Status: ACTIVE | COMMUNITY
Start: 2024-02-12 | End: 1900-01-01

## 2024-02-12 RX ORDER — SULFAMETHOXAZOLE AND TRIMETHOPRIM 800; 160 MG/1; MG/1
800-160 TABLET ORAL TWICE DAILY
Qty: 14 | Refills: 0 | Status: DISCONTINUED | COMMUNITY
Start: 2023-12-06 | End: 2024-02-12

## 2024-02-12 RX ORDER — METFORMIN ER 500 MG 500 MG/1
500 TABLET ORAL
Qty: 30 | Refills: 4 | Status: DISCONTINUED | COMMUNITY
Start: 2023-10-17 | End: 2024-02-12

## 2024-02-12 RX ORDER — OMEPRAZOLE MAGNESIUM 20 MG/1
20 TABLET, DELAYED RELEASE ORAL DAILY
Qty: 180 | Refills: 2 | Status: DISCONTINUED | COMMUNITY
Start: 2023-12-06 | End: 2024-02-12

## 2024-02-12 NOTE — ASSESSMENT
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 #Suspected CELINE - pulm ordered sleep study for suspected CELINE, didn't do it yet - Follows Dr. Cirilo Coronado - follows Dr. Christian for cardio  #Depression/Anxiety #Hx of panic attacks -c/w lexapro, amitryptyline, lamictal - follows behavioral health  #Obesity #JOEL new diagnosis -wt loss advised - follows Hepatology here - weight loss and controlling DM encouraged  # iron deficiency anemia, resolved - recent labs hemoglobin improved to 14.7 - d/c iron supplement; wrote form to DC medication at [facility]  # type 1 DM: - A1c: 10.3 - Follows up with endocrinology Dr. Galvez - pt reports poor compliance to insulin; educated and encouraged to take it as instructed - not on metformin (Dr. Galvez stopped it as per patient)  HCM - Follows GYN regularly; - Opthalmology: UpToDate - RTC in 6 months or PRN

## 2024-02-12 NOTE — REVIEW OF SYSTEMS
[Chest Pain] : chest pain [Cough] : cough [Fever] : no fever [Chills] : no chills [Discharge] : no discharge [Pain] : no pain [Earache] : no earache [Shortness Of Breath] : no shortness of breath [Wheezing] : no wheezing [Dysuria] : no dysuria [FreeTextEntry4] : congesting left ear [FreeTextEntry5] : while coughing and sometimes while walking

## 2024-02-12 NOTE — PHYSICAL EXAM
[No Acute Distress] : no acute distress [Well Nourished] : well nourished [Well Developed] : well developed [Normal Sclera/Conjunctiva] : normal sclera/conjunctiva [Normal Outer Ear/Nose] : the outer ears and nose were normal in appearance [Normal Oropharynx] : the oropharynx was normal [No Respiratory Distress] : no respiratory distress  [No Accessory Muscle Use] : no accessory muscle use [Clear to Auscultation] : lungs were clear to auscultation bilaterally [Normal Rate] : normal rate  [Regular Rhythm] : with a regular rhythm [Normal S1, S2] : normal S1 and S2 [No Edema] : there was no peripheral edema [Soft] : abdomen soft [Non Tender] : non-tender [No CVA Tenderness] : no CVA  tenderness [No Spinal Tenderness] : no spinal tenderness [No Joint Swelling] : no joint swelling [No Rash] : no rash

## 2024-02-12 NOTE — HISTORY OF PRESENT ILLNESS
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 She had laryngitis at the time, went to the ED and diagnosed with COVID. She did not require oxygen, was not admitted, and did not get treatment for COVID. No fever. Her symptoms were mainly cough which persisted until now ( one month later). She went back to the ED 12 days ago and was only prescribed Tessalon pearls. She is still having dry cough with left sided chest pain that only comes while coughing and sometimes while walking.  Her mother also says that she barely gets out of bed, is always tired and sleeping, and is blaming the mother for everything going on. Patient is depressed and reports feeling depressed but is seeing a new psychiatrist in 2 days (didn't like her previous one).

## 2024-02-13 DIAGNOSIS — R05.3 CHRONIC COUGH: ICD-10-CM

## 2024-02-14 ENCOUNTER — APPOINTMENT (OUTPATIENT)
Dept: PSYCHIATRY | Facility: CLINIC | Age: 39
End: 2024-02-14

## 2024-02-21 ENCOUNTER — APPOINTMENT (OUTPATIENT)
Dept: NEUROLOGY | Facility: CLINIC | Age: 39
End: 2024-02-21
Payer: MEDICAID

## 2024-02-21 VITALS
DIASTOLIC BLOOD PRESSURE: 84 MMHG | HEIGHT: 64 IN | BODY MASS INDEX: 34.66 KG/M2 | WEIGHT: 203 LBS | HEART RATE: 95 BPM | SYSTOLIC BLOOD PRESSURE: 119 MMHG

## 2024-02-21 DIAGNOSIS — G62.9 POLYNEUROPATHY, UNSPECIFIED: ICD-10-CM

## 2024-02-21 DIAGNOSIS — R25.1 TREMOR, UNSPECIFIED: ICD-10-CM

## 2024-02-21 DIAGNOSIS — M54.50 LOW BACK PAIN, UNSPECIFIED: ICD-10-CM

## 2024-02-21 DIAGNOSIS — R51.9 HEADACHE, UNSPECIFIED: ICD-10-CM

## 2024-02-21 DIAGNOSIS — G56.03 CARPAL TUNNEL SYNDROM,BILATERAL UPPER LIMBS: ICD-10-CM

## 2024-02-21 PROCEDURE — 99215 OFFICE O/P EST HI 40 MIN: CPT

## 2024-02-21 PROCEDURE — G2211 COMPLEX E/M VISIT ADD ON: CPT | Mod: NC,1L

## 2024-02-21 NOTE — PHYSICAL EXAM
[FreeTextEntry1] : Mental status: Awake, alert and oriented x3. Recent and remote memory intact. Naming, repetition and comprehension intact. Attention/concentration intact. No dysarthria, no aphasia.  Cranial nerves: Pupils equally round and reactive to light, visual fields full, no nystagmus, extraocular muscles intact, V1 through V3 intact bilaterally and symmetric, face symmetric, hearing intact to finger rub, palate elevation symmetric, tongue was midline.  Motor: MRC grading 5/5 b/l UE/LE.  strength 5/5. Normal tone and bulk. No abnormal movements. No rigidity.   Sensation: Reduced vibration in the right toe, otherwise all equal to temperature, touch, and vibration.   Coordination: No dysmetria on finger-to-nose.  Reflexes: 1+ in the knees  Gait: Gait is narrow and steady without walker. No ataxia.

## 2024-02-21 NOTE — ASSESSMENT
[FreeTextEntry1] : Ms. MONTGOMERY 38 year old female presents for a follow up visit regarding multiple neurologic complaints.   -Neuropathy: DM uncontrolled, advised to control DM, not painful at this time. Uses a rolling walker.  -Headaches: Controlled on Elavil no worsening -Tremors: Previously worked up with VEEG; advised pseudoseizures, mainly occur in social situations and are likely secondary to psychiatric overlay. She is in the process of getting a new psychiatrist. Will continue to monitor clinically. -CTS: Increase compliance with braces -Ring finger locks, ?muscle cramp/spasm vs. trigger finger. Advised increase hydration (reports poor hydration) and eval if this helps. May need to refer to hand surgeon. -Low back pain controlled  RTC 3-6 months.

## 2024-02-21 NOTE — HISTORY OF PRESENT ILLNESS
[FreeTextEntry1] : Ms. MONTGOMERY 38 year old female presents for a follow up visit. She is a former patient of Dr. Dumont last seen September 2023. PMH non epileptic seizures-follows with psychiatry who is managing her Lamictal, on Elavil for headaches, Carpal Tunnel Syndrome non complaint with carpal tunnel braces, and back pain uses a rolling walker, polyneuropathy likely in the setting of DM- currently uncontrolled.   Today reports:  Getting hand and leg tremors, more often than prior. Per mom they occur occasionally, and she mainly gets them when she's out. They think it may be r/t social anxiety since it mainly occurs in social situations. No LOC, no staring off, no drooling. No correlation to rest vs. movement. Mom reports she had an episode in the waiting room, which was likely r/t anxiety since she felt like people were staring at her.  She is still on Lamictal, psych is managing this. She just got a new psychiatrist.  Neuropathy is stable, no numbness, only gets tingling, she gets burning with prolonged walked. DM uncontrolled. No ulcers, sees podiatry. Was dx with small fiber neuropathy in the past.  Headaches are improved since her last visit on Elavil. Gets headache 3-4 x per month, varies in duration. They're less severe than used to be. Sometimes frontal and back of head, which is the same location as always.  Does not report back pain recently.  Regarding CTS compliant with her wrist brace, her fingers lock. Her ring fingers lock when she wakes up in the morning- she reports significant inadequate hydration. Usually is relieved in about 10 mins.

## 2024-02-26 ENCOUNTER — APPOINTMENT (OUTPATIENT)
Dept: PULMONOLOGY | Facility: CLINIC | Age: 39
End: 2024-02-26

## 2024-02-26 ENCOUNTER — NON-APPOINTMENT (OUTPATIENT)
Age: 39
End: 2024-02-26

## 2024-02-26 NOTE — ED PROVIDER NOTE - PENDING LAB RAD OPT OUT
PRE-SEDATION ASSESSMENT    CONSENT  Consent for procedure and sedation obtained: Yes    PHYSICAL EXAM  Airway Anatomy : Class I  Heart : Normal  Lungs : Normal  LOC/Mental Status : Normal    OTHER FINDINGS  Reviewed current medications and allergies: Yes    SEDATION RISK ASSESSMENT  Risk Status ASA: ASA II  A patient with mild systemic disease    Past anesthetic/sedation complications/reactions: No    If yes, explain: None    
Exclude Pending Lab and Radiology orders from printing on the Patient's Discharge Instructions, due to Privacy Concerns.

## 2024-02-28 ENCOUNTER — APPOINTMENT (OUTPATIENT)
Dept: PSYCHIATRY | Facility: CLINIC | Age: 39
End: 2024-02-28

## 2024-02-28 ENCOUNTER — EMERGENCY (EMERGENCY)
Facility: HOSPITAL | Age: 39
LOS: 0 days | Discharge: HOME | End: 2024-02-28
Attending: EMERGENCY MEDICINE
Payer: MEDICAID

## 2024-02-28 VITALS
OXYGEN SATURATION: 99 % | SYSTOLIC BLOOD PRESSURE: 146 MMHG | HEART RATE: 99 BPM | RESPIRATION RATE: 17 BRPM | DIASTOLIC BLOOD PRESSURE: 80 MMHG

## 2024-02-28 VITALS
HEIGHT: 64 IN | TEMPERATURE: 98 F | RESPIRATION RATE: 18 BRPM | WEIGHT: 199.08 LBS | SYSTOLIC BLOOD PRESSURE: 155 MMHG | OXYGEN SATURATION: 98 % | HEART RATE: 117 BPM | DIASTOLIC BLOOD PRESSURE: 95 MMHG

## 2024-02-28 DIAGNOSIS — R42 DIZZINESS AND GIDDINESS: ICD-10-CM

## 2024-02-28 DIAGNOSIS — Z88.0 ALLERGY STATUS TO PENICILLIN: ICD-10-CM

## 2024-02-28 DIAGNOSIS — Z88.1 ALLERGY STATUS TO OTHER ANTIBIOTIC AGENTS STATUS: ICD-10-CM

## 2024-02-28 DIAGNOSIS — F31.9 BIPOLAR DISORDER, UNSPECIFIED: ICD-10-CM

## 2024-02-28 DIAGNOSIS — E11.65 TYPE 2 DIABETES MELLITUS WITH HYPERGLYCEMIA: ICD-10-CM

## 2024-02-28 DIAGNOSIS — E78.5 HYPERLIPIDEMIA, UNSPECIFIED: ICD-10-CM

## 2024-02-28 DIAGNOSIS — I10 ESSENTIAL (PRIMARY) HYPERTENSION: ICD-10-CM

## 2024-02-28 DIAGNOSIS — R73.9 HYPERGLYCEMIA, UNSPECIFIED: ICD-10-CM

## 2024-02-28 DIAGNOSIS — Z88.7 ALLERGY STATUS TO SERUM AND VACCINE: ICD-10-CM

## 2024-02-28 DIAGNOSIS — Z79.4 LONG TERM (CURRENT) USE OF INSULIN: ICD-10-CM

## 2024-02-28 LAB
ALBUMIN SERPL ELPH-MCNC: 4.1 G/DL — SIGNIFICANT CHANGE UP (ref 3.5–5.2)
ALP SERPL-CCNC: 256 U/L — HIGH (ref 30–115)
ALT FLD-CCNC: 115 U/L — HIGH (ref 0–41)
ANION GAP SERPL CALC-SCNC: 13 MMOL/L — SIGNIFICANT CHANGE UP (ref 7–14)
APPEARANCE UR: CLEAR — SIGNIFICANT CHANGE UP
AST SERPL-CCNC: 73 U/L — HIGH (ref 0–41)
B-OH-BUTYR SERPL-SCNC: <0.2 MMOL/L — SIGNIFICANT CHANGE UP
BASOPHILS # BLD AUTO: 0.05 K/UL — SIGNIFICANT CHANGE UP (ref 0–0.2)
BASOPHILS NFR BLD AUTO: 0.6 % — SIGNIFICANT CHANGE UP (ref 0–1)
BILIRUB SERPL-MCNC: <0.2 MG/DL — SIGNIFICANT CHANGE UP (ref 0.2–1.2)
BILIRUB UR-MCNC: NEGATIVE — SIGNIFICANT CHANGE UP
BUN SERPL-MCNC: 11 MG/DL — SIGNIFICANT CHANGE UP (ref 10–20)
CALCIUM SERPL-MCNC: 9.7 MG/DL — SIGNIFICANT CHANGE UP (ref 8.4–10.5)
CHLORIDE SERPL-SCNC: 96 MMOL/L — LOW (ref 98–110)
CO2 SERPL-SCNC: 24 MMOL/L — SIGNIFICANT CHANGE UP (ref 17–32)
COLOR SPEC: YELLOW — SIGNIFICANT CHANGE UP
CREAT SERPL-MCNC: 0.7 MG/DL — SIGNIFICANT CHANGE UP (ref 0.7–1.5)
DIFF PNL FLD: ABNORMAL
EGFR: 113 ML/MIN/1.73M2 — SIGNIFICANT CHANGE UP
EOSINOPHIL # BLD AUTO: 0.15 K/UL — SIGNIFICANT CHANGE UP (ref 0–0.7)
EOSINOPHIL NFR BLD AUTO: 1.8 % — SIGNIFICANT CHANGE UP (ref 0–8)
GAS PNL BLDV: SIGNIFICANT CHANGE UP
GLUCOSE SERPL-MCNC: 389 MG/DL — HIGH (ref 70–99)
GLUCOSE UR QL: >=1000 MG/DL
HCG SERPL QL: NEGATIVE — SIGNIFICANT CHANGE UP
HCT VFR BLD CALC: 39.3 % — SIGNIFICANT CHANGE UP (ref 37–47)
HGB BLD-MCNC: 12.8 G/DL — SIGNIFICANT CHANGE UP (ref 12–16)
IMM GRANULOCYTES NFR BLD AUTO: 0.6 % — HIGH (ref 0.1–0.3)
KETONES UR-MCNC: NEGATIVE MG/DL — SIGNIFICANT CHANGE UP
LEUKOCYTE ESTERASE UR-ACNC: NEGATIVE — SIGNIFICANT CHANGE UP
LIDOCAIN IGE QN: 13 U/L — SIGNIFICANT CHANGE UP (ref 7–60)
LYMPHOCYTES # BLD AUTO: 1.99 K/UL — SIGNIFICANT CHANGE UP (ref 1.2–3.4)
LYMPHOCYTES # BLD AUTO: 24.4 % — SIGNIFICANT CHANGE UP (ref 20.5–51.1)
MAGNESIUM SERPL-MCNC: 1.9 MG/DL — SIGNIFICANT CHANGE UP (ref 1.8–2.4)
MCHC RBC-ENTMCNC: 27.4 PG — SIGNIFICANT CHANGE UP (ref 27–31)
MCHC RBC-ENTMCNC: 32.6 G/DL — SIGNIFICANT CHANGE UP (ref 32–37)
MCV RBC AUTO: 84.2 FL — SIGNIFICANT CHANGE UP (ref 81–99)
MONOCYTES # BLD AUTO: 0.55 K/UL — SIGNIFICANT CHANGE UP (ref 0.1–0.6)
MONOCYTES NFR BLD AUTO: 6.7 % — SIGNIFICANT CHANGE UP (ref 1.7–9.3)
NEUTROPHILS # BLD AUTO: 5.36 K/UL — SIGNIFICANT CHANGE UP (ref 1.4–6.5)
NEUTROPHILS NFR BLD AUTO: 65.9 % — SIGNIFICANT CHANGE UP (ref 42.2–75.2)
NITRITE UR-MCNC: NEGATIVE — SIGNIFICANT CHANGE UP
NRBC # BLD: 0 /100 WBCS — SIGNIFICANT CHANGE UP (ref 0–0)
OSMOLALITY SERPL: 293 MOS/KG — SIGNIFICANT CHANGE UP (ref 275–300)
PH UR: 6 — SIGNIFICANT CHANGE UP (ref 5–8)
PLATELET # BLD AUTO: 380 K/UL — SIGNIFICANT CHANGE UP (ref 130–400)
PMV BLD: 10.6 FL — HIGH (ref 7.4–10.4)
POTASSIUM SERPL-MCNC: 4.6 MMOL/L — SIGNIFICANT CHANGE UP (ref 3.5–5)
POTASSIUM SERPL-SCNC: 4.6 MMOL/L — SIGNIFICANT CHANGE UP (ref 3.5–5)
PROT SERPL-MCNC: 7.2 G/DL — SIGNIFICANT CHANGE UP (ref 6–8)
PROT UR-MCNC: NEGATIVE MG/DL — SIGNIFICANT CHANGE UP
RBC # BLD: 4.67 M/UL — SIGNIFICANT CHANGE UP (ref 4.2–5.4)
RBC # FLD: 14.7 % — HIGH (ref 11.5–14.5)
SODIUM SERPL-SCNC: 133 MMOL/L — LOW (ref 135–146)
SP GR SPEC: >1.03 — HIGH (ref 1–1.03)
UROBILINOGEN FLD QL: 0.2 MG/DL — SIGNIFICANT CHANGE UP (ref 0.2–1)
WBC # BLD: 8.15 K/UL — SIGNIFICANT CHANGE UP (ref 4.8–10.8)
WBC # FLD AUTO: 8.15 K/UL — SIGNIFICANT CHANGE UP (ref 4.8–10.8)

## 2024-02-28 PROCEDURE — 84132 ASSAY OF SERUM POTASSIUM: CPT

## 2024-02-28 PROCEDURE — 87086 URINE CULTURE/COLONY COUNT: CPT

## 2024-02-28 PROCEDURE — 99285 EMERGENCY DEPT VISIT HI MDM: CPT

## 2024-02-28 PROCEDURE — 82962 GLUCOSE BLOOD TEST: CPT

## 2024-02-28 PROCEDURE — 84703 CHORIONIC GONADOTROPIN ASSAY: CPT

## 2024-02-28 PROCEDURE — 36415 COLL VENOUS BLD VENIPUNCTURE: CPT

## 2024-02-28 PROCEDURE — 83930 ASSAY OF BLOOD OSMOLALITY: CPT

## 2024-02-28 PROCEDURE — 82330 ASSAY OF CALCIUM: CPT

## 2024-02-28 PROCEDURE — 80053 COMPREHEN METABOLIC PANEL: CPT

## 2024-02-28 PROCEDURE — 85025 COMPLETE CBC W/AUTO DIFF WBC: CPT

## 2024-02-28 PROCEDURE — 84295 ASSAY OF SERUM SODIUM: CPT

## 2024-02-28 PROCEDURE — 85018 HEMOGLOBIN: CPT

## 2024-02-28 PROCEDURE — 82803 BLOOD GASES ANY COMBINATION: CPT

## 2024-02-28 PROCEDURE — 81001 URINALYSIS AUTO W/SCOPE: CPT

## 2024-02-28 PROCEDURE — 83735 ASSAY OF MAGNESIUM: CPT

## 2024-02-28 PROCEDURE — 83690 ASSAY OF LIPASE: CPT

## 2024-02-28 PROCEDURE — 82010 KETONE BODYS QUAN: CPT

## 2024-02-28 PROCEDURE — 83605 ASSAY OF LACTIC ACID: CPT

## 2024-02-28 PROCEDURE — 99284 EMERGENCY DEPT VISIT MOD MDM: CPT | Mod: 25

## 2024-02-28 PROCEDURE — 85014 HEMATOCRIT: CPT

## 2024-02-28 RX ORDER — SODIUM CHLORIDE 9 MG/ML
2000 INJECTION INTRAMUSCULAR; INTRAVENOUS; SUBCUTANEOUS ONCE
Refills: 0 | Status: COMPLETED | OUTPATIENT
Start: 2024-02-28 | End: 2024-02-28

## 2024-02-28 RX ADMIN — SODIUM CHLORIDE 4000 MILLILITER(S): 9 INJECTION INTRAMUSCULAR; INTRAVENOUS; SUBCUTANEOUS at 13:20

## 2024-02-28 NOTE — ED ADULT NURSE NOTE - NSFALLUNIVINTERV_ED_ALL_ED
Bed/Stretcher in lowest position, wheels locked, appropriate side rails in place/Call bell, personal items and telephone in reach/Instruct patient to call for assistance before getting out of bed/chair/stretcher/Non-slip footwear applied when patient is off stretcher/[place] to call system/Physically safe environment - no spills, clutter or unnecessary equipment/Purposeful proactive rounding/Room/bathroom lighting operational, light cord in reach

## 2024-02-28 NOTE — ED PROVIDER NOTE - PATIENT PORTAL LINK FT
You can access the FollowMyHealth Patient Portal offered by [facility] by registering at the following website: [email or web address]. By joining [employer]’s FollowMyHealth portal, you will also be able to view your health information using other applications (apps) compatible with our system.

## 2024-02-28 NOTE — ED ADULT TRIAGE NOTE - CHIEF COMPLAINT QUOTE
01-Dec-2022
12-Nov-2022
BIBEMS for hyperglycemia. Pt dizzy, shaky, slight sob. Pt states she has trace ketones.

## 2024-02-28 NOTE — ED PROVIDER NOTE - CLINICAL SUMMARY MEDICAL DECISION MAKING FREE TEXT BOX
pt with IDDM, uncontrolled dm, no signs of dka.  pt is strongly urged to use her sliding scale even if night snacks/meals.  pt to f/u with dr. bustamante.  Any ordered labs and EKG were reviewed, Dr. Renetta Bonilla, attending physician.  Any imaging was ordered and reviewed by me, Dr. Renetta Bonilla, attending physician.  Appropriate medications for patient's presenting complaints were ordered and effects were reassessed.  Patient's records, if available,  (prior hospital, ED visit, and/or nursing home notes if available) were reviewed.  Additional history was obtained from EMS, family, and/or PCP (when available).  Escalation to admission/observation was considered.  1) However patient feels much better and is comfortable with discharge.  Appropriate follow-up was arranged.

## 2024-02-28 NOTE — ED PROVIDER NOTE - OBJECTIVE STATEMENT
38-year-old female with a past med history of hypertension, bipolar, and diabetes who presents to the ED with elevated glucose. Reports that she has been having glucose in the 400s for the past 1 month and started noticing lightheadedness earlier today, so came to the ED for evaluation. Reports that she is been very compliant with her insulin and has not been able to get a hold of her endocrinologist for the elevated glucose level. It is noted in the triage note that patient also has shortness of breath, but patient denies such chief complaint.. Denies fever, shortness breath, chest pain, nausea, vomiting, abdominal pain, urinary symptoms, and change with bowel movement.

## 2024-02-28 NOTE — ED PROVIDER NOTE - ATTENDING APP SHARED VISIT CONTRIBUTION OF CARE
37 yo f with pmh of iddm, htn, hld, presents with c/o uncontrolled dm x 1 month.  pt says has insulin pump.  has been averaging 300-400 daily.  today her glucose was over 500 so came to be evaluated.  pt denies fever, chills, n/v/d, abd pain, cough/uri.  no cp or sob.  pt admits she is supposed to be on sliding scale from pump but she frequently eats after dinner/her last insulin dose, and she does not redose her insulin again during her night snacks/meals.  says having trouble f/u with her endo dr. bustamante.  exam: nad, ncat, perrl, eomi, mmm, rrr, ctab, abd soft, nt, nd, overweight, aox3, imp: pt with IDDM, uncontrolled dm, will r/o dka.  pt is strongly urged to use her sliding scale even if night snacks/meals.

## 2024-02-28 NOTE — ED PROVIDER NOTE - PHYSICAL EXAMINATION
CONSTITUTIONAL:  in no apparent distress.   HEAD: Normocephalic; atraumatic.   EYES: Pupils are round and reactive, extra-ocular muscles are intact. Eyelids are normal in appearance without swelling or lesions.   ENT: Hearing is intact with good acuity to spoken voice.  Patient is speaking clearly, not muffled and airway is intact.   RESPIRATORY: No signs of respiratory distress. Lung sounds are clear in all lobes bilaterally without rales, rhonchi, or wheezes.  CARDIOVASCULAR: Regular rate and rhythm.   GI: Abdomen is soft, non-tender, and without distention. Bowel sounds are present and normoactive in all four quadrants. No masses are noted.   NEURO: A & O x 3. Normal speech. No focal deficit.  PSYCHOLOGICAL: Appropriate mood and affect. Good judgement and insight.

## 2024-02-29 ENCOUNTER — NON-APPOINTMENT (OUTPATIENT)
Age: 39
End: 2024-02-29

## 2024-02-29 LAB
CULTURE RESULTS: SIGNIFICANT CHANGE UP
SPECIMEN SOURCE: SIGNIFICANT CHANGE UP

## 2024-02-29 NOTE — ED BEHAVIORAL HEALTH ASSESSMENT NOTE - NSBHATTESTTYPEVISIT_PSY_A_CORE
worsen.  Patient agreeable.  -     hydrocortisone (ANUSOL-HC) 25 MG suppository; Place 1 suppository rectally 2 times daily as needed for Hemorrhoids, Disp-30 suppository, R-1Normal         Personalized Preventive Plan   Current Health Maintenance Status  Immunization History   Administered Date(s) Administered    Hep B, ENGERIX-B, RECOMBIVAX-HB, (age Birth - 19y), IM, 0.5mL 2001, 02/14/2002, 07/08/2002    Influenza, FLUARIX, FLULAVAL, FLUZONE (age 6 mo+) AND AFLURIA, (age 3 y+), PF, 0.5mL 11/08/2019    MMR, PRIORIX, M-M-R II, (age 12m+), SC, 0.5mL 01/22/2003, 09/01/2006    PPD Test 02/04/2020    TDaP, ADACEL (age 10y-64y), BOOSTRIX (age 10y+), IM, 0.5mL 07/10/2014    Varicella, VARIVAX, (age 12m+), SC, 0.5mL 01/22/2003, 09/01/2006        Health Maintenance   Topic Date Due    COVID-19 Vaccine (1) Never done    Pap smear  Never done    Depression Screen  01/25/2024    Chlamydia/GC screen  05/05/2024    DTaP/Tdap/Td vaccine (7 - Td or Tdap) 07/10/2024    Hepatitis A vaccine  Completed    Hepatitis B vaccine  Completed    Hib vaccine  Completed    HPV vaccine  Completed    Polio vaccine  Completed    Varicella vaccine  Completed    Meningococcal (ACWY) vaccine  Completed    Hepatitis C screen  Completed    HIV screen  Completed    Pneumococcal 0-64 years Vaccine  Aged Out    Measles,Mumps,Rubella (MMR) vaccine  Discontinued     Recommendations for Preventive Services Due: see orders and patient instructions/AVS.    No follow-ups on file.         Attending with Resident/Fellow/Student

## 2024-03-01 ENCOUNTER — APPOINTMENT (OUTPATIENT)
Dept: PSYCHIATRY | Facility: CLINIC | Age: 39
End: 2024-03-01

## 2024-03-03 ENCOUNTER — RX RENEWAL (OUTPATIENT)
Age: 39
End: 2024-03-03

## 2024-03-03 RX ORDER — LANCETS 28 GAUGE
EACH MISCELLANEOUS
Qty: 8 | Refills: 3 | Status: ACTIVE | COMMUNITY
Start: 2020-08-19 | End: 1900-01-01

## 2024-03-06 NOTE — ED PROVIDER NOTE - OBJECTIVE STATEMENT
I refilled the 3, but could not find test strips on his med list. Please find out what type of test strips he uses. Be as specific as possible.  Thanks    36 y.o. F, pmh GERD, DM1, Obesity , MDD here for cp and abdominal pain. CP present x2 months s/p URI. No lewis, syncope, cough ,sob, congestion. Had nuc stress in past, was unable to undergo CCTA 2/2 tachycardia. Abdominal pain constant, exacerbated by meals and wraps around to back. No N/V, diarrhea or constipation. Takes no meds. Has been unable to be seen by GI.

## 2024-03-07 ENCOUNTER — NON-APPOINTMENT (OUTPATIENT)
Age: 39
End: 2024-03-07

## 2024-03-07 NOTE — DISCUSSION/SUMMARY
[FreeTextEntry1] : Patient left message asking to schedule another therapy session. Left message informing patient that she needs to first schedule and be able to keep her followup session with her psychiatrist, Dr. Garcia, since he will not be able to refill her medication again until she is able to keep a session with him.

## 2024-03-08 ENCOUNTER — APPOINTMENT (OUTPATIENT)
Dept: OTOLARYNGOLOGY | Facility: CLINIC | Age: 39
End: 2024-03-08

## 2024-03-13 ENCOUNTER — OUTPATIENT (OUTPATIENT)
Dept: OUTPATIENT SERVICES | Facility: HOSPITAL | Age: 39
LOS: 1 days | End: 2024-03-13
Payer: MEDICAID

## 2024-03-13 ENCOUNTER — APPOINTMENT (OUTPATIENT)
Dept: INTERNAL MEDICINE | Facility: CLINIC | Age: 39
End: 2024-03-13
Payer: MEDICAID

## 2024-03-13 VITALS
HEART RATE: 108 BPM | WEIGHT: 199 LBS | DIASTOLIC BLOOD PRESSURE: 84 MMHG | OXYGEN SATURATION: 98 % | TEMPERATURE: 97.2 F | HEIGHT: 64 IN | SYSTOLIC BLOOD PRESSURE: 137 MMHG | BODY MASS INDEX: 33.97 KG/M2

## 2024-03-13 DIAGNOSIS — J45.909 UNSPECIFIED ASTHMA, UNCOMPLICATED: ICD-10-CM

## 2024-03-13 DIAGNOSIS — R05.9 COUGH, UNSPECIFIED: ICD-10-CM

## 2024-03-13 DIAGNOSIS — Z00.00 ENCOUNTER FOR GENERAL ADULT MEDICAL EXAMINATION WITHOUT ABNORMAL FINDINGS: ICD-10-CM

## 2024-03-13 PROCEDURE — 99214 OFFICE O/P EST MOD 30 MIN: CPT

## 2024-03-13 NOTE — PHYSICAL EXAM
[No Acute Distress] : no acute distress [Well Developed] : well developed [Well Nourished] : well nourished [Normal Sclera/Conjunctiva] : normal sclera/conjunctiva [Normal Outer Ear/Nose] : the outer ears and nose were normal in appearance [Normal Oropharynx] : the oropharynx was normal [No Respiratory Distress] : no respiratory distress  [No Accessory Muscle Use] : no accessory muscle use [Normal Rate] : normal rate  [Clear to Auscultation] : lungs were clear to auscultation bilaterally [Regular Rhythm] : with a regular rhythm [Normal S1, S2] : normal S1 and S2 [No Edema] : there was no peripheral edema [Soft] : abdomen soft [Non Tender] : non-tender [No CVA Tenderness] : no CVA  tenderness [No Spinal Tenderness] : no spinal tenderness [No Joint Swelling] : no joint swelling [No Rash] : no rash

## 2024-03-13 NOTE — HISTORY OF PRESENT ILLNESS
[FreeTextEntry1] : persistent cough [de-identified] : 38 year old female with a PMH of chronic pelvic pain, Type 1 DM with chronic polyneuropathy of the lower extremities on an insulin pump, recurrent UTIs, PCOS, GERD, anxiety, psuedoseizures, LLL Pulmonary nodule?; bipolar disorder, x4 prior suicidal attempts; carpal tunnel, and suspected CELINE presents for follow up after being diagnosed with COVID on Jan 19th. She had laryngitis at the time, went to the ED and diagnosed with COVID. She did not require oxygen, was not admitted, and did not get treatment for COVID. No fever. Her symptoms were mainly cough which persisted until now ( two month later). She was seen in clinic last visit for a similar complaint. She is now having a wet cough with upper torso pain that only comes while coughing and sometimes while walking.  Her mother also says that she barely gets out of bed, is always tired and sleeping, Patient is  depressed and reports feeling depressed but is seeing a new psychiatrist  tomorrow . denies suicidal and homicidal ideations. denies fever, chill,s nause, vomiting, abdominal pain

## 2024-03-13 NOTE — ASSESSMENT
[FreeTextEntry1] : 38 year old female with a PMH of chronic pelvic pain, Type 1 DM with chronic polyneuropathy of the lower extremities on an insulin pump, recurrent UTIs, PCOS, GERD, anxiety, psuedoseizures, LLL Pulmonary nodule; bipolar disorder, x4 prior suicidal attempts; carpal tunnel, and suspected CELINE presents for cough of 1 month duration post COVID   #Cough post COVID #Hx of chronic cough - CXR in ED negative - started Robitussin & albuterol PRN but patient is not taking it as needed - educated the patient ot try to take it, will add on Tessalon pearls   # GERD - c/w omeprazole 40mg qd  #pulmonary nodule? #Suspected CELINE - pulm ordered sleep study for suspected CELINE, didn't do it yet - Follows Dr. Cirilo Coronado - follows Dr. Christian for cardio  #Depression/Anxiety #Hx of panic attacks -c/w lexapro, amitryptyline, lamictal - follows behavioral health  #Obesity #JOEL new diagnosis -wt loss advised - follows Hepatology here - weight loss and controlling DM encouraged  # type 1 DM: - A1c: 10.3 (09/23) - Follows up with endocrinology Dr. Galvez - pt reports poor compliance to insulin; educated and encouraged to take it as instructed - not on metformin (Dr. Galvez stopped it as per patient)  HCM - Follows GYN regularly; - Opthalmology: UpToDate - RTC in 6 months or PRN

## 2024-03-13 NOTE — REVIEW OF SYSTEMS
[Chest Pain] : chest pain [Cough] : cough [Fever] : no fever [Discharge] : no discharge [Chills] : no chills [Pain] : no pain [Earache] : no earache [Shortness Of Breath] : no shortness of breath [Wheezing] : no wheezing [Dysuria] : no dysuria [FreeTextEntry5] : while coughing and sometimes while walking  [FreeTextEntry4] : congesting left ear

## 2024-03-14 ENCOUNTER — EMERGENCY (EMERGENCY)
Facility: HOSPITAL | Age: 39
LOS: 0 days | Discharge: HOME | End: 2024-03-14
Attending: EMERGENCY MEDICINE
Payer: MEDICAID

## 2024-03-14 ENCOUNTER — APPOINTMENT (OUTPATIENT)
Dept: NEUROLOGY | Facility: CLINIC | Age: 39
End: 2024-03-14

## 2024-03-14 ENCOUNTER — APPOINTMENT (OUTPATIENT)
Dept: PSYCHIATRY | Facility: CLINIC | Age: 39
End: 2024-03-14

## 2024-03-14 VITALS
HEIGHT: 64 IN | RESPIRATION RATE: 20 BRPM | WEIGHT: 199.96 LBS | TEMPERATURE: 98 F | HEART RATE: 94 BPM | SYSTOLIC BLOOD PRESSURE: 132 MMHG | DIASTOLIC BLOOD PRESSURE: 77 MMHG | OXYGEN SATURATION: 100 %

## 2024-03-14 DIAGNOSIS — E11.9 TYPE 2 DIABETES MELLITUS WITHOUT COMPLICATIONS: ICD-10-CM

## 2024-03-14 DIAGNOSIS — Z88.8 ALLERGY STATUS TO OTHER DRUGS, MEDICAMENTS AND BIOLOGICAL SUBSTANCES: ICD-10-CM

## 2024-03-14 DIAGNOSIS — F31.9 BIPOLAR DISORDER, UNSPECIFIED: ICD-10-CM

## 2024-03-14 DIAGNOSIS — R42 DIZZINESS AND GIDDINESS: ICD-10-CM

## 2024-03-14 DIAGNOSIS — R05.9 COUGH, UNSPECIFIED: ICD-10-CM

## 2024-03-14 DIAGNOSIS — Z88.7 ALLERGY STATUS TO SERUM AND VACCINE: ICD-10-CM

## 2024-03-14 DIAGNOSIS — Z88.0 ALLERGY STATUS TO PENICILLIN: ICD-10-CM

## 2024-03-14 DIAGNOSIS — Z88.1 ALLERGY STATUS TO OTHER ANTIBIOTIC AGENTS STATUS: ICD-10-CM

## 2024-03-14 DIAGNOSIS — I10 ESSENTIAL (PRIMARY) HYPERTENSION: ICD-10-CM

## 2024-03-14 LAB
ALBUMIN SERPL ELPH-MCNC: 3.9 G/DL — SIGNIFICANT CHANGE UP (ref 3.5–5.2)
ALP SERPL-CCNC: 220 U/L — HIGH (ref 30–115)
ALT FLD-CCNC: 117 U/L — HIGH (ref 0–41)
ANION GAP SERPL CALC-SCNC: 13 MMOL/L — SIGNIFICANT CHANGE UP (ref 7–14)
AST SERPL-CCNC: 84 U/L — HIGH (ref 0–41)
B-OH-BUTYR SERPL-SCNC: <0.2 MMOL/L — SIGNIFICANT CHANGE UP
BASE EXCESS BLDV CALC-SCNC: 3 MMOL/L — SIGNIFICANT CHANGE UP (ref -2–3)
BASOPHILS # BLD AUTO: 0.05 K/UL — SIGNIFICANT CHANGE UP (ref 0–0.2)
BASOPHILS NFR BLD AUTO: 0.6 % — SIGNIFICANT CHANGE UP (ref 0–1)
BILIRUB SERPL-MCNC: <0.2 MG/DL — SIGNIFICANT CHANGE UP (ref 0.2–1.2)
BUN SERPL-MCNC: 12 MG/DL — SIGNIFICANT CHANGE UP (ref 10–20)
CA-I SERPL-SCNC: 1.23 MMOL/L — SIGNIFICANT CHANGE UP (ref 1.15–1.33)
CALCIUM SERPL-MCNC: 8.9 MG/DL — SIGNIFICANT CHANGE UP (ref 8.4–10.5)
CHLORIDE SERPL-SCNC: 99 MMOL/L — SIGNIFICANT CHANGE UP (ref 98–110)
CO2 SERPL-SCNC: 20 MMOL/L — SIGNIFICANT CHANGE UP (ref 17–32)
CREAT SERPL-MCNC: 0.6 MG/DL — LOW (ref 0.7–1.5)
EGFR: 118 ML/MIN/1.73M2 — SIGNIFICANT CHANGE UP
EOSINOPHIL # BLD AUTO: 0.12 K/UL — SIGNIFICANT CHANGE UP (ref 0–0.7)
EOSINOPHIL NFR BLD AUTO: 1.3 % — SIGNIFICANT CHANGE UP (ref 0–8)
FLUAV AG NPH QL: SIGNIFICANT CHANGE UP
FLUBV AG NPH QL: SIGNIFICANT CHANGE UP
GAS PNL BLDV: 130 MMOL/L — LOW (ref 136–145)
GAS PNL BLDV: SIGNIFICANT CHANGE UP
GLUCOSE SERPL-MCNC: 321 MG/DL — HIGH (ref 70–99)
HCO3 BLDV-SCNC: 28 MMOL/L — SIGNIFICANT CHANGE UP (ref 22–29)
HCT VFR BLD CALC: 37.4 % — SIGNIFICANT CHANGE UP (ref 37–47)
HCT VFR BLDA CALC: 38 % — SIGNIFICANT CHANGE UP (ref 34.5–46.5)
HGB BLD CALC-MCNC: 12.5 G/DL — SIGNIFICANT CHANGE UP (ref 11.7–16.1)
HGB BLD-MCNC: 12.3 G/DL — SIGNIFICANT CHANGE UP (ref 12–16)
IMM GRANULOCYTES NFR BLD AUTO: 0.6 % — HIGH (ref 0.1–0.3)
LACTATE BLDV-MCNC: 1.7 MMOL/L — SIGNIFICANT CHANGE UP (ref 0.5–2)
LYMPHOCYTES # BLD AUTO: 1.85 K/UL — SIGNIFICANT CHANGE UP (ref 1.2–3.4)
LYMPHOCYTES # BLD AUTO: 20.6 % — SIGNIFICANT CHANGE UP (ref 20.5–51.1)
MCHC RBC-ENTMCNC: 27.5 PG — SIGNIFICANT CHANGE UP (ref 27–31)
MCHC RBC-ENTMCNC: 32.9 G/DL — SIGNIFICANT CHANGE UP (ref 32–37)
MCV RBC AUTO: 83.5 FL — SIGNIFICANT CHANGE UP (ref 81–99)
MONOCYTES # BLD AUTO: 0.63 K/UL — HIGH (ref 0.1–0.6)
MONOCYTES NFR BLD AUTO: 7 % — SIGNIFICANT CHANGE UP (ref 1.7–9.3)
NEUTROPHILS # BLD AUTO: 6.3 K/UL — SIGNIFICANT CHANGE UP (ref 1.4–6.5)
NEUTROPHILS NFR BLD AUTO: 69.9 % — SIGNIFICANT CHANGE UP (ref 42.2–75.2)
NRBC # BLD: 0 /100 WBCS — SIGNIFICANT CHANGE UP (ref 0–0)
PCO2 BLDV: 43 MMHG — HIGH (ref 39–42)
PH BLDV: 7.42 — SIGNIFICANT CHANGE UP (ref 7.32–7.43)
PLATELET # BLD AUTO: 370 K/UL — SIGNIFICANT CHANGE UP (ref 130–400)
PMV BLD: 10.5 FL — HIGH (ref 7.4–10.4)
PO2 BLDV: 64 MMHG — HIGH (ref 25–45)
POTASSIUM BLDV-SCNC: 4.7 MMOL/L — SIGNIFICANT CHANGE UP (ref 3.5–5.1)
POTASSIUM SERPL-MCNC: SIGNIFICANT CHANGE UP MMOL/L (ref 3.5–5)
POTASSIUM SERPL-SCNC: SIGNIFICANT CHANGE UP MMOL/L (ref 3.5–5)
PROT SERPL-MCNC: 7.4 G/DL — SIGNIFICANT CHANGE UP (ref 6–8)
RBC # BLD: 4.48 M/UL — SIGNIFICANT CHANGE UP (ref 4.2–5.4)
RBC # FLD: 15 % — HIGH (ref 11.5–14.5)
RSV RNA NPH QL NAA+NON-PROBE: SIGNIFICANT CHANGE UP
SAO2 % BLDV: 91.6 % — HIGH (ref 67–88)
SARS-COV-2 RNA SPEC QL NAA+PROBE: SIGNIFICANT CHANGE UP
SODIUM SERPL-SCNC: 132 MMOL/L — LOW (ref 135–146)
WBC # BLD: 9 K/UL — SIGNIFICANT CHANGE UP (ref 4.8–10.8)
WBC # FLD AUTO: 9 K/UL — SIGNIFICANT CHANGE UP (ref 4.8–10.8)

## 2024-03-14 PROCEDURE — 0241U: CPT

## 2024-03-14 PROCEDURE — 84132 ASSAY OF SERUM POTASSIUM: CPT

## 2024-03-14 PROCEDURE — 99285 EMERGENCY DEPT VISIT HI MDM: CPT | Mod: 25

## 2024-03-14 PROCEDURE — 82330 ASSAY OF CALCIUM: CPT

## 2024-03-14 PROCEDURE — 93010 ELECTROCARDIOGRAM REPORT: CPT

## 2024-03-14 PROCEDURE — 80053 COMPREHEN METABOLIC PANEL: CPT

## 2024-03-14 PROCEDURE — 85025 COMPLETE CBC W/AUTO DIFF WBC: CPT

## 2024-03-14 PROCEDURE — 82010 KETONE BODYS QUAN: CPT

## 2024-03-14 PROCEDURE — 85014 HEMATOCRIT: CPT

## 2024-03-14 PROCEDURE — 82803 BLOOD GASES ANY COMBINATION: CPT

## 2024-03-14 PROCEDURE — 71046 X-RAY EXAM CHEST 2 VIEWS: CPT

## 2024-03-14 PROCEDURE — 93005 ELECTROCARDIOGRAM TRACING: CPT

## 2024-03-14 PROCEDURE — 36415 COLL VENOUS BLD VENIPUNCTURE: CPT

## 2024-03-14 PROCEDURE — 83605 ASSAY OF LACTIC ACID: CPT

## 2024-03-14 PROCEDURE — 85018 HEMOGLOBIN: CPT

## 2024-03-14 PROCEDURE — 99285 EMERGENCY DEPT VISIT HI MDM: CPT

## 2024-03-14 PROCEDURE — 71046 X-RAY EXAM CHEST 2 VIEWS: CPT | Mod: 26

## 2024-03-14 PROCEDURE — 84295 ASSAY OF SERUM SODIUM: CPT

## 2024-03-14 NOTE — ED PROVIDER NOTE - CLINICAL SUMMARY MEDICAL DECISION MAKING FREE TEXT BOX
Patient presented with cough and lightheadedness x 2 months as documented. Otherwise HD stable, well appearing, no acute respiratory distress on RA. Lungs clear. No meningeal signs or petechiae/rash, no concern for strep pharyngitis based on centor criteria, neuro intact, TMs clear, abdomen non-tender. Obtained labs which were grossly unremarkable including no significant leukocytosis, anemia, signs of dehydration/CELINA, transaminitis or significant electrolyte abnormalities. CXR with no focal consolidations to suggest PNA and EKG unremarkable. Patient remained stable on RA, and able to ambulate without difficulty or desaturation. Likely viral etiology. Given the above, will discharge home with outpatient follow up. Patient agreeable with plan. Agrees to return to ED immediately for any new or worsening symptoms.

## 2024-03-14 NOTE — ED PROVIDER NOTE - OBJECTIVE STATEMENT
38-year-old female with PMH bipolar disorder, DM, HTN presenting for cough and lightheadedness since January.  Patient reports she had COVID in January and since then the cough has not gone away.  Patient also reports that she is unable to walk long distances without stopping to catch her breath.  Denies fever, chills, body aches.  Patient reports that a roommates of hers at [facility] was diagnosed with pneumonia yesterday and admitted to the hospital so she wants to be checked out. Patient was seen by her PMD yesterday and prescribed Tessalon Perles, cough syrup, albuterol inhaler. Patient also complains of redness and irritation of the genital area; denies dysuria, vaginal discharge, vaginal bleeding.  Not sexually active.

## 2024-03-14 NOTE — ED PROVIDER NOTE - PHYSICAL EXAMINATION
CONSTITUTIONAL: Well-developed; well-nourished; in no acute distress.   SKIN: Warm, dry  HEAD: Normocephalic; atraumatic  EYES: EOMI, normal sclera and conjunctiva. No pallor  ENT: No nasal discharge; airway clear.  CARD:  Regular rate and rhythm. Normal S1, S2. MMM  RESP: No increased WOB. CTA b/l without wheezes, crackles, rhonchi. No wheezing  ABD: Soft, nontender, nondistended.  : (chaperoned by KALLIE Varela) no external erythema or lesions, nontender  EXT: Normal ROM.   NEURO: Alert, oriented, grossly unremarkable  PSYCH: Cooperative, appropriate.

## 2024-03-14 NOTE — ED PROVIDER NOTE - NSFOLLOWUPINSTRUCTIONS_ED_ALL_ED_FT
Follow up with your primary care doctor.    Cough    Coughing is a reflex that clears your throat and your airways. Coughing helps to heal and protect your lungs. It is normal to cough occasionally, but a cough that happens with other symptoms or lasts a long time may be a sign of a condition that needs treatment. Coughing may be caused by infections, asthma or COPD, smoking, postnasal drip, gastroesophageal reflux, as well as other medical conditions. Take medicines only as instructed by your health care provider. Avoid environments or triggers that causes you to cough at work or at home.    SEEK IMMEDIATE MEDICAL CARE IF YOU HAVE ANY OF THE FOLLOWING SYMPTOMS: coughing up blood, shortness of breath, rapid heart rate, chest pain, unexplained weight loss or night sweats.

## 2024-03-14 NOTE — ED PROVIDER NOTE - DIFFERENTIAL DIAGNOSIS
Cough, lightheadedness, likely viral etiology but will r/o pneumonia vs other emergent underlying metabolic derangement including dehydration vs electrolyte abnormality vs anemia Differential Diagnosis

## 2024-03-14 NOTE — ED PROVIDER NOTE - PATIENT PORTAL LINK FT
You can access the FollowMyHealth Patient Portal offered by [facility] by registering at the following website: [email or web address]. By joining [employer]’s FollowMyHealth portal, you will also be able to view your health information using other applications (apps) compatible with our system.

## 2024-03-14 NOTE — ED ADULT NURSE NOTE - NSFALLUNIVINTERV_ED_ALL_ED
Bed/Stretcher in lowest position, wheels locked, appropriate side rails in place/Call bell, personal items and telephone in reach/Instruct patient to call for assistance before getting out of bed/chair/stretcher/Non-slip footwear applied when patient is off stretcher/[place] to call system/Physically safe environment - no spills, clutter or unnecessary equipment/Purposeful proactive rounding/Room/bathroom lighting operational, light cord in reach

## 2024-03-14 NOTE — ED PROVIDER NOTE - EKG/XRAY ADDITIONAL INFORMATION
Chest xray negative for pneumothorax, pneumonia, widened mediastinum, evidence of rib fractures, enlarged cardiac silhouette or any other emergent pathologies.    EKG showed NSR, no significant st-t abnormalities

## 2024-03-15 ENCOUNTER — RX RENEWAL (OUTPATIENT)
Age: 39
End: 2024-03-15

## 2024-03-15 RX ORDER — URINE ACETONE TEST STRIPS
STRIP MISCELLANEOUS
Qty: 50 | Refills: 5 | Status: ACTIVE | COMMUNITY
Start: 2021-10-12 | End: 1900-01-01

## 2024-03-19 DIAGNOSIS — R05.9 COUGH, UNSPECIFIED: ICD-10-CM

## 2024-03-19 DIAGNOSIS — E66.9 OBESITY, UNSPECIFIED: ICD-10-CM

## 2024-03-19 DIAGNOSIS — J45.909 UNSPECIFIED ASTHMA, UNCOMPLICATED: ICD-10-CM

## 2024-03-19 DIAGNOSIS — I10 ESSENTIAL (PRIMARY) HYPERTENSION: ICD-10-CM

## 2024-03-20 ENCOUNTER — APPOINTMENT (OUTPATIENT)
Dept: ENDOCRINOLOGY | Facility: CLINIC | Age: 39
End: 2024-03-20
Payer: MEDICAID

## 2024-03-20 DIAGNOSIS — Z96.41 PRESENCE OF INSULIN PUMP (EXTERNAL) (INTERNAL): ICD-10-CM

## 2024-03-20 PROCEDURE — 99441: CPT

## 2024-03-20 NOTE — REVIEW OF SYSTEMS
[Recent Weight Gain (___ Lbs)] : recent weight gain: [unfilled] lbs [Depression] : depression [Anxiety] : anxiety [Stress] : stress [Fatigue] : no fatigue [Decreased Appetite] : appetite not decreased [Recent Weight Loss (___ Lbs)] : no recent weight loss [Visual Field Defect] : no visual field defect [Blurred Vision] : no blurred vision [Dysphagia] : no dysphagia [Dysphonia] : no dysphonia [Palpitations] : no palpitations [Shortness Of Breath] : shortness of breath [Cough] : no cough [Orthopnea] : no orthopnea [SOB on Exertion] : shortness of breath on exertion [PND] : no Paroxysmal Nocturnal Dyspnea [As Noted in HPI] : as noted in HPI [Nausea] : no nausea [Constipation] : no constipation [Vomiting] : no vomiting [Diarrhea] : no diarrhea [Nocturia] : no nocturia [Acanthosis] : no acanthosis  [Acne] : no acne [Dry Skin] : no dry skin [Hirsutism] : no hirsutism [Headaches] : no headaches [Tremors] : no tremors [Cold Intolerance] : no cold intolerance [Heat Intolerance] : no heat intolerance

## 2024-03-20 NOTE — HISTORY OF PRESENT ILLNESS
[Finger Stick] : Finger Stick: Yes [Continuous Glucose Monitoring] : Continuous Glucose Monitoring: No [Hypoglycemia] : Patient is not hypoglycemic. [FreeTextEntry1] : faxing log

## 2024-03-20 NOTE — REASON FOR VISIT
[Follow - Up] : a follow-up visit [DM Type 1] : DM Type 1 [Home] : at home, [unfilled] , at the time of the visit. [Medical Office: ([facility])___] : at the medical office located in  [Verbal consent obtained from patient] : the patient, [unfilled] [FreeTextEntry3] : patient has covid

## 2024-03-20 NOTE — DATA REVIEWED
[FreeTextEntry1] : previous data and record reviewed  1/15/21:  hba1c 8.7% glucose 203  LDL 116crea 0.7  AST 15  ALT 18  hb 13.6  6/1/21: Hba1c 8.9% glucose 283  crea0.62   10/4/21: HBA1c 10.8% glucose 190   TSH 1.310  ft4 1.22 ALT 56   9/30/22: HBA1c 9.9%   hb 8.3 crea 0.63    alk phos 138  alb/crea  39  25 vit D 25.2 TSH 1.02  1/2023:A1c 9.8%  hb 9.6  glucose 355 crea 0.63   AST 29  ALT 52     ACR 24  TSH 0.791 fructosamine 440 b12 715   6/2023: A1c 10.7 % glucose 276 crea 0.7 gfr 113 AST 27 ALT 61  3/2024: glucose 576 crea 0.77   AST 52   alk [id number]   TSh 2.27

## 2024-03-20 NOTE — ASSESSMENT
[Diabetes Foot Care] : diabetes foot care [Long Term Vascular Complications] : long term vascular complications of diabetes [Carbohydrate Consistent Diet] : carbohydrate consistent diet [Importance of Diet and Exercise] : importance of diet and exercise to improve glycemic control, achieve weight loss and improve cardiovascular health [Exercise/Effect on Glucose] : exercise/effect on glucose [Hypoglycemia Management] : hypoglycemia management [Action and use of Insulin] : action and use of short and long-acting insulin [Self Monitoring of Blood Glucose] : self monitoring of blood glucose [Insulin Self-Administration] : insulin self-administration [Injection Technique, Storage, Sharps Disposal] : injection technique, storage, and sharps disposal [Weight Loss] : weight loss [FreeTextEntry1] : 38 year old patient with type 1 DM on insulin pump presents for follow up, also has evidence of insulin resistance and now with JOEL    #poorly controlled type 1 DM on insulin pump Medtronic 630 G - pattern of hyperglycemia pre and post meals , attributed to noncompliance with diet , per mother and Ania , she was not watching her carbs and eating snack also not always taking boluses with snacks , she has COVID now no steroids use  changed rates and I: C  as above increase settings  - monitor lipids , lft  - following with podiatry/ psychiatry

## 2024-03-20 NOTE — PHYSICAL EXAM
[Alert] : alert [Well Nourished] : well nourished [Healthy Appearance] : healthy appearance [No Acute Distress] : no acute distress [No Proptosis] : no proptosis [No Lid Lag] : no lid lag [No LAD] : no lymphadenopathy [Thyroid Not Enlarged] : the thyroid was not enlarged [No Thyroid Nodules] : no palpable thyroid nodules [No Accessory Muscle Use] : no accessory muscle use [Clear to Auscultation] : lungs were clear to auscultation bilaterally [Normal S1, S2] : normal S1 and S2 [No Murmurs] : no murmurs [Normal Rate] : heart rate was normal [Soft] : abdomen soft [No CVA Tenderness] : no ~M costovertebral angle tenderness [No Stigmata of Cushings Syndrome] : no stigmata of Cushings Syndrome [Abdominal Striae] : no abdominal striae [Acanthosis Nigricans] : no acanthosis nigricans [Hirsutism] : no hirsutism [Oriented x3] : oriented to person, place, and time [Normal Reflexes] : deep tendon reflexes were 2+ and symmetric [de-identified] : [place] hump

## 2024-03-21 ENCOUNTER — APPOINTMENT (OUTPATIENT)
Dept: SLEEP CENTER | Facility: HOSPITAL | Age: 39
End: 2024-03-21

## 2024-03-26 ENCOUNTER — OUTPATIENT (OUTPATIENT)
Dept: OUTPATIENT SERVICES | Facility: HOSPITAL | Age: 39
LOS: 1 days | End: 2024-03-26
Payer: MEDICAID

## 2024-03-26 ENCOUNTER — APPOINTMENT (OUTPATIENT)
Dept: PSYCHIATRY | Facility: CLINIC | Age: 39
End: 2024-03-26
Payer: MEDICAID

## 2024-03-26 ENCOUNTER — NON-APPOINTMENT (OUTPATIENT)
Age: 39
End: 2024-03-26

## 2024-03-26 DIAGNOSIS — F41.9 ANXIETY DISORDER, UNSPECIFIED: ICD-10-CM

## 2024-03-26 DIAGNOSIS — F60.3 BORDERLINE PERSONALITY DISORDER: ICD-10-CM

## 2024-03-26 DIAGNOSIS — F41.1 GENERALIZED ANXIETY DISORDER: ICD-10-CM

## 2024-03-26 PROCEDURE — 99215 OFFICE O/P EST HI 40 MIN: CPT | Mod: 95

## 2024-03-26 NOTE — RISK ASSESSMENT
[Yes, patient reports ideation or behavior] : Yes, patient reports ideation or behavior [No, patient denies ideation or behavior] : No, patient denies ideation or behavior [Low acute suicide risk] : Low acute suicide risk [Yes] : Yes [FreeTextEntry9] : Pt appears safe to continue with treatment at clinic level of care.  She expressed more hopefulness at end of session today. [FreeTextEntry8] : Pt reports chronic suicidal thinking but denies any active intent or plan.

## 2024-03-26 NOTE — PLAN
[Medication education provided] : Medication education provided. [Rationale for medication choices, possible risks/precautions, benefits, alternative treatment choices, and consequences of non-treatment discussed] : Rationale for medication choices, possible risks/precautions, benefits, alternative treatment choices, and consequences of non-treatment discussed with patient/family/caregiver  [FreeTextEntry5] : Continue current meds for today but will likely change out the Lexapro for Zoloft Pt will get Amitriptyline level first Individual therapy RTC 2 weeks [FreeTextEntry4] : Medication management to mitigate active depressive and anxiety sx.

## 2024-03-26 NOTE — PHYSICAL EXAM
[Average] : average [Cooperative] : cooperative [Depressed] : depressed [Anxious] : anxious [Constricted] : constricted [Clear] : clear [Linear/Goal Directed] : linear/goal directed [None] : none [None Reported] : none reported [WNL] : within normal limits [de-identified] : fair [de-identified] : fair

## 2024-03-26 NOTE — SOCIAL HISTORY
[FreeTextEntry1] : Father  in .  Pt lives with mother in assisted living ([facility]). Pt did not finish high school due to severe depression.

## 2024-03-26 NOTE — REASON FOR VISIT
[Other Location: e.g. School (Enter Location, City,State)___] : at [unfilled], at the time of the visit. [Other Location: e.g. Home (Enter Location, City,State)___] : at [unfilled] [Patient] : Patient [Mother] : mother [FreeTextEntry1] : Medication management.
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
## 2024-03-26 NOTE — DISCUSSION/SUMMARY
[FreeTextEntry1] : Pt with chronic depression and anxiety.  I do not find evidence to support a bipolar dx, though there is likely underlying cluster B pathology.  Current medication regimen is not producing significant benefit.  Pt would like to keep the Lamictal, but we will likely change out the Lexapro.  Before making any changes, pt is to have an Amitriptyline level drawn.

## 2024-03-27 DIAGNOSIS — F41.1 GENERALIZED ANXIETY DISORDER: ICD-10-CM

## 2024-03-27 DIAGNOSIS — F60.3 BORDERLINE PERSONALITY DISORDER: ICD-10-CM

## 2024-03-27 DIAGNOSIS — F41.9 ANXIETY DISORDER, UNSPECIFIED: ICD-10-CM

## 2024-03-28 RX ORDER — DEXTROMETHORPHAN HYDROBROMIDE, GUAIFENESIN 20; 200 MG/20ML; MG/20ML
20-200 SOLUTION ORAL EVERY 4 HOURS
Qty: 5 | Refills: 0 | Status: ACTIVE | COMMUNITY
Start: 2024-03-27 | End: 1900-01-01

## 2024-03-29 ENCOUNTER — NON-APPOINTMENT (OUTPATIENT)
Age: 39
End: 2024-03-29

## 2024-03-31 ENCOUNTER — INPATIENT (INPATIENT)
Facility: HOSPITAL | Age: 39
LOS: 11 days | Discharge: HOME | DRG: 756 | End: 2024-04-12
Attending: PSYCHIATRY & NEUROLOGY | Admitting: PSYCHIATRY & NEUROLOGY
Payer: MEDICAID

## 2024-03-31 VITALS
RESPIRATION RATE: 16 BRPM | WEIGHT: 199.96 LBS | SYSTOLIC BLOOD PRESSURE: 135 MMHG | OXYGEN SATURATION: 97 % | HEIGHT: 64 IN | TEMPERATURE: 98 F | HEART RATE: 94 BPM | DIASTOLIC BLOOD PRESSURE: 76 MMHG

## 2024-03-31 DIAGNOSIS — R45.851 SUICIDAL IDEATIONS: ICD-10-CM

## 2024-03-31 LAB
ALBUMIN SERPL ELPH-MCNC: 4 G/DL — SIGNIFICANT CHANGE UP (ref 3.5–5.2)
ALP SERPL-CCNC: 164 U/L — HIGH (ref 30–115)
ALT FLD-CCNC: 74 U/L — HIGH (ref 0–41)
ANION GAP SERPL CALC-SCNC: 11 MMOL/L — SIGNIFICANT CHANGE UP (ref 7–14)
APAP SERPL-MCNC: <5 UG/ML — LOW (ref 10–30)
AST SERPL-CCNC: 30 U/L — SIGNIFICANT CHANGE UP (ref 0–41)
BASOPHILS # BLD AUTO: 0.06 K/UL — SIGNIFICANT CHANGE UP (ref 0–0.2)
BASOPHILS NFR BLD AUTO: 0.5 % — SIGNIFICANT CHANGE UP (ref 0–1)
BILIRUB SERPL-MCNC: <0.2 MG/DL — SIGNIFICANT CHANGE UP (ref 0.2–1.2)
BUN SERPL-MCNC: 11 MG/DL — SIGNIFICANT CHANGE UP (ref 10–20)
CALCIUM SERPL-MCNC: 9.1 MG/DL — SIGNIFICANT CHANGE UP (ref 8.4–10.5)
CHLORIDE SERPL-SCNC: 96 MMOL/L — LOW (ref 98–110)
CO2 SERPL-SCNC: 25 MMOL/L — SIGNIFICANT CHANGE UP (ref 17–32)
CREAT SERPL-MCNC: 0.7 MG/DL — SIGNIFICANT CHANGE UP (ref 0.7–1.5)
EGFR: 113 ML/MIN/1.73M2 — SIGNIFICANT CHANGE UP
EOSINOPHIL # BLD AUTO: 0.1 K/UL — SIGNIFICANT CHANGE UP (ref 0–0.7)
EOSINOPHIL NFR BLD AUTO: 0.9 % — SIGNIFICANT CHANGE UP (ref 0–8)
ETHANOL SERPL-MCNC: <10 MG/DL — SIGNIFICANT CHANGE UP
FLUAV AG NPH QL: SIGNIFICANT CHANGE UP
FLUBV AG NPH QL: SIGNIFICANT CHANGE UP
GLUCOSE BLDC GLUCOMTR-MCNC: 212 MG/DL — HIGH (ref 70–99)
GLUCOSE BLDC GLUCOMTR-MCNC: 246 MG/DL — HIGH (ref 70–99)
GLUCOSE SERPL-MCNC: 353 MG/DL — HIGH (ref 70–99)
HCT VFR BLD CALC: 40.9 % — SIGNIFICANT CHANGE UP (ref 37–47)
HGB BLD-MCNC: 13.2 G/DL — SIGNIFICANT CHANGE UP (ref 12–16)
IMM GRANULOCYTES NFR BLD AUTO: 0.5 % — HIGH (ref 0.1–0.3)
LYMPHOCYTES # BLD AUTO: 17.7 % — LOW (ref 20.5–51.1)
LYMPHOCYTES # BLD AUTO: 2.01 K/UL — SIGNIFICANT CHANGE UP (ref 1.2–3.4)
MCHC RBC-ENTMCNC: 26.6 PG — LOW (ref 27–31)
MCHC RBC-ENTMCNC: 32.3 G/DL — SIGNIFICANT CHANGE UP (ref 32–37)
MCV RBC AUTO: 82.3 FL — SIGNIFICANT CHANGE UP (ref 81–99)
MONOCYTES # BLD AUTO: 0.69 K/UL — HIGH (ref 0.1–0.6)
MONOCYTES NFR BLD AUTO: 6.1 % — SIGNIFICANT CHANGE UP (ref 1.7–9.3)
NEUTROPHILS # BLD AUTO: 8.44 K/UL — HIGH (ref 1.4–6.5)
NEUTROPHILS NFR BLD AUTO: 74.3 % — SIGNIFICANT CHANGE UP (ref 42.2–75.2)
NRBC # BLD: 0 /100 WBCS — SIGNIFICANT CHANGE UP (ref 0–0)
PLATELET # BLD AUTO: 448 K/UL — HIGH (ref 130–400)
PMV BLD: 10.1 FL — SIGNIFICANT CHANGE UP (ref 7.4–10.4)
POTASSIUM SERPL-MCNC: 4 MMOL/L — SIGNIFICANT CHANGE UP (ref 3.5–5)
POTASSIUM SERPL-SCNC: 4 MMOL/L — SIGNIFICANT CHANGE UP (ref 3.5–5)
PROT SERPL-MCNC: 6.9 G/DL — SIGNIFICANT CHANGE UP (ref 6–8)
RBC # BLD: 4.97 M/UL — SIGNIFICANT CHANGE UP (ref 4.2–5.4)
RBC # FLD: 14.9 % — HIGH (ref 11.5–14.5)
RSV RNA NPH QL NAA+NON-PROBE: SIGNIFICANT CHANGE UP
SALICYLATES SERPL-MCNC: <0.3 MG/DL — LOW (ref 4–30)
SARS-COV-2 RNA SPEC QL NAA+PROBE: SIGNIFICANT CHANGE UP
SODIUM SERPL-SCNC: 132 MMOL/L — LOW (ref 135–146)
WBC # BLD: 11.36 K/UL — HIGH (ref 4.8–10.8)
WBC # FLD AUTO: 11.36 K/UL — HIGH (ref 4.8–10.8)

## 2024-03-31 PROCEDURE — 82330 ASSAY OF CALCIUM: CPT

## 2024-03-31 PROCEDURE — 82803 BLOOD GASES ANY COMBINATION: CPT

## 2024-03-31 PROCEDURE — 83036 HEMOGLOBIN GLYCOSYLATED A1C: CPT

## 2024-03-31 PROCEDURE — 85027 COMPLETE CBC AUTOMATED: CPT

## 2024-03-31 PROCEDURE — 93010 ELECTROCARDIOGRAM REPORT: CPT

## 2024-03-31 PROCEDURE — 97162 PT EVAL MOD COMPLEX 30 MIN: CPT | Mod: GP

## 2024-03-31 PROCEDURE — 85025 COMPLETE CBC W/AUTO DIFF WBC: CPT

## 2024-03-31 PROCEDURE — 83605 ASSAY OF LACTIC ACID: CPT

## 2024-03-31 PROCEDURE — 93005 ELECTROCARDIOGRAM TRACING: CPT

## 2024-03-31 PROCEDURE — 80053 COMPREHEN METABOLIC PANEL: CPT

## 2024-03-31 PROCEDURE — 85014 HEMATOCRIT: CPT

## 2024-03-31 PROCEDURE — 84132 ASSAY OF SERUM POTASSIUM: CPT

## 2024-03-31 PROCEDURE — 85018 HEMOGLOBIN: CPT

## 2024-03-31 PROCEDURE — 99285 EMERGENCY DEPT VISIT HI MDM: CPT

## 2024-03-31 PROCEDURE — 84295 ASSAY OF SERUM SODIUM: CPT

## 2024-03-31 PROCEDURE — 82962 GLUCOSE BLOOD TEST: CPT

## 2024-03-31 RX ORDER — ALBUTEROL 90 UG/1
2 AEROSOL, METERED ORAL EVERY 6 HOURS
Refills: 0 | Status: DISCONTINUED | OUTPATIENT
Start: 2024-03-31 | End: 2024-04-12

## 2024-03-31 RX ORDER — SENNA PLUS 8.6 MG/1
2 TABLET ORAL AT BEDTIME
Refills: 0 | Status: DISCONTINUED | OUTPATIENT
Start: 2024-03-31 | End: 2024-04-12

## 2024-03-31 RX ORDER — INSULIN GLARGINE 100 [IU]/ML
20 INJECTION, SOLUTION SUBCUTANEOUS AT BEDTIME
Refills: 0 | Status: DISCONTINUED | OUTPATIENT
Start: 2024-03-31 | End: 2024-04-02

## 2024-03-31 RX ORDER — SODIUM CHLORIDE 9 MG/ML
1000 INJECTION, SOLUTION INTRAVENOUS
Refills: 0 | Status: DISCONTINUED | OUTPATIENT
Start: 2024-03-31 | End: 2024-04-12

## 2024-03-31 RX ORDER — DEXTROSE 50 % IN WATER 50 %
25 SYRINGE (ML) INTRAVENOUS ONCE
Refills: 0 | Status: DISCONTINUED | OUTPATIENT
Start: 2024-03-31 | End: 2024-04-12

## 2024-03-31 RX ORDER — GLUCAGON INJECTION, SOLUTION 0.5 MG/.1ML
1 INJECTION, SOLUTION SUBCUTANEOUS ONCE
Refills: 0 | Status: DISCONTINUED | OUTPATIENT
Start: 2024-03-31 | End: 2024-04-12

## 2024-03-31 RX ORDER — ACETAMINOPHEN 500 MG
650 TABLET ORAL EVERY 6 HOURS
Refills: 0 | Status: DISCONTINUED | OUTPATIENT
Start: 2024-03-31 | End: 2024-04-10

## 2024-03-31 RX ORDER — POLYETHYLENE GLYCOL 3350 17 G/17G
17 POWDER, FOR SOLUTION ORAL AT BEDTIME
Refills: 0 | Status: DISCONTINUED | OUTPATIENT
Start: 2024-03-31 | End: 2024-04-02

## 2024-03-31 RX ORDER — ONDANSETRON 8 MG/1
4 TABLET, FILM COATED ORAL EVERY 8 HOURS
Refills: 0 | Status: DISCONTINUED | OUTPATIENT
Start: 2024-03-31 | End: 2024-04-12

## 2024-03-31 RX ORDER — AMITRIPTYLINE HCL 25 MG
75 TABLET ORAL DAILY
Refills: 0 | Status: DISCONTINUED | OUTPATIENT
Start: 2024-03-31 | End: 2024-04-12

## 2024-03-31 RX ORDER — INSULIN LISPRO 100/ML
VIAL (ML) SUBCUTANEOUS
Refills: 0 | Status: DISCONTINUED | OUTPATIENT
Start: 2024-03-31 | End: 2024-04-12

## 2024-03-31 RX ORDER — DEXTROSE 50 % IN WATER 50 %
15 SYRINGE (ML) INTRAVENOUS ONCE
Refills: 0 | Status: DISCONTINUED | OUTPATIENT
Start: 2024-03-31 | End: 2024-04-12

## 2024-03-31 RX ORDER — METOPROLOL TARTRATE 50 MG
25 TABLET ORAL THREE TIMES A DAY
Refills: 0 | Status: DISCONTINUED | OUTPATIENT
Start: 2024-03-31 | End: 2024-04-12

## 2024-03-31 RX ORDER — LAMOTRIGINE 25 MG/1
50 TABLET, ORALLY DISINTEGRATING ORAL AT BEDTIME
Refills: 0 | Status: DISCONTINUED | OUTPATIENT
Start: 2024-03-31 | End: 2024-04-02

## 2024-03-31 RX ORDER — ESCITALOPRAM OXALATE 10 MG/1
20 TABLET, FILM COATED ORAL DAILY
Refills: 0 | Status: DISCONTINUED | OUTPATIENT
Start: 2024-03-31 | End: 2024-04-04

## 2024-03-31 RX ORDER — LIDOCAINE 4 G/100G
1 CREAM TOPICAL EVERY 6 HOURS
Refills: 0 | Status: DISCONTINUED | OUTPATIENT
Start: 2024-03-31 | End: 2024-04-12

## 2024-03-31 RX ORDER — DEXTROSE 50 % IN WATER 50 %
12.5 SYRINGE (ML) INTRAVENOUS ONCE
Refills: 0 | Status: DISCONTINUED | OUTPATIENT
Start: 2024-03-31 | End: 2024-04-12

## 2024-03-31 RX ADMIN — INSULIN GLARGINE 20 UNIT(S): 100 INJECTION, SOLUTION SUBCUTANEOUS at 20:32

## 2024-03-31 RX ADMIN — Medication 25 MILLIGRAM(S): at 20:24

## 2024-03-31 RX ADMIN — ESCITALOPRAM OXALATE 20 MILLIGRAM(S): 10 TABLET, FILM COATED ORAL at 18:42

## 2024-03-31 RX ADMIN — LAMOTRIGINE 50 MILLIGRAM(S): 25 TABLET, ORALLY DISINTEGRATING ORAL at 20:24

## 2024-03-31 RX ADMIN — Medication 75 MILLIGRAM(S): at 18:41

## 2024-03-31 NOTE — CONSULT NOTE ADULT - ASSESSMENT
38 year old  female, domiciled at [facility],  past medical history of T1D, diabetic neuropathy, HTN, chronic back pain, migraine headaches, pseudoseizures, with past psychiatric history of depression/anxiety and cluster B personality disorder, 8 previous psychiatric admissions (most recently discharged from [facility] on 6/13/23), multiple previous suicide attempts (remote hx of once by taking a capful of acetone, another by drinking hydrogen peroxide, another attempt by taking half a bottle of ibuprofen; most recent SA about a year ago by ingesting Pine-Sol, no known medical consequences), no legal or violence hx, denies substance use, BIBEMS activated by residence facility after patient expressed suicidal ideation.    ASSESSMENT    Suicidal Ideation  Depression / Anxiety  Type 1 DM on insulin pump  HTN  Migraines  Pseudoseizures    RECOMMENDATIONS 38 year old  female, domiciled at [facility],  past medical history of T1D, diabetic neuropathy, HTN, chronic back pain, migraine headaches, pseudoseizures, with past psychiatric history of depression/anxiety and cluster B personality disorder, 8 previous psychiatric admissions (most recently discharged from [facility] on 6/13/23), multiple previous suicide attempts (remote hx of once by taking a capful of acetone, another by drinking hydrogen peroxide, another attempt by taking half a bottle of ibuprofen; most recent SA about a year ago by ingesting Pine-Sol, no known medical consequences), no legal or violence hx, denies substance use, BIBEMS activated by residence facility after patient expressed suicidal ideation.    ASSESSMENT    ·	Suicidal Ideation  ·	Depression / Anxiety  ·	Type 1 DM on insulin pump  ·	HTN  ·	Migraines  ·	Pseudoseizures    RECOMMENDATIONS    - psych meds per primary team  - has been on insulin pump / was told by team as well as family that she can;t have her insulin pump in IPP, upon removal of insulin pump please give lantus 20 q24 hours and start her on sliding scale / patient and family state her glucose gets higher when she eats junk food at night however would likely not be able to here, as BG better at the moment and not having an appetite here will start relatively conservatively with her lantus (past endocrinology notes reviewed and has a hx of hypoglycemia), if consistently high can increase lantus dose  - c/w home lopressor  - for anal fissure pain can add 2% lidocaine gel topical and give miralax bid and senna, if symptoms and mild bleeding while wiping doesn't improve please consult GI  - please repeat CBC and CMP in three days to trend white count and hyponatremia, likely dehydration    Recall PRN   38 year old  female, domiciled at [facility],  past medical history of T1D, diabetic neuropathy, HTN, chronic back pain, migraine headaches, pseudoseizures, with past psychiatric history of depression/anxiety and cluster B personality disorder, 8 previous psychiatric admissions (most recently discharged from [facility] on 6/13/23), multiple previous suicide attempts (remote hx of once by taking a capful of acetone, another by drinking hydrogen peroxide, another attempt by taking half a bottle of ibuprofen; most recent SA about a year ago by ingesting Pine-Sol, no known medical consequences), no legal or violence hx, denies substance use, BIBEMS activated by residence facility after patient expressed suicidal ideation.    ASSESSMENT    ·	Suicidal Ideation  ·	Depression / Anxiety  ·	Type 1 DM on insulin pump  ·	HTN  ·	Migraines  ·	Pseudoseizures    RECOMMENDATIONS    - psych meds per primary team  - has been on insulin pump / was told by team as well as family that she can;t have her insulin pump in IPP, upon removal of insulin pump please give lantus 20 q24 hours and start her on sliding scale / patient and family state her glucose gets higher when she eats junk food at night however would likely not be able to here, as BG better at the moment and not having an appetite here will start relatively conservatively with her lantus (past endocrinology notes reviewed and has a hx of hypoglycemia), if consistently high can increase lantus dose, poc glucose with meals and bedtime  - c/w home lopressor  - for anal fissure pain can add 2% lidocaine gel topical and give miralax bid and senna, if symptoms and mild bleeding while wiping doesn't improve please consult GI  - please repeat CBC and CMP in three days to trend white count and hyponatremia, likely dehydration    Recall PRN

## 2024-03-31 NOTE — ED ADULT TRIAGE NOTE - CHIEF COMPLAINT QUOTE
KRISTIN from [facility] for suicidal ideations, as per pt "I have been having suicidal thoughts for 1 month now, I see a psychiatrist but I've just been increasingly depressed and decided to come in." Pt denies plan to hurt herself, denies homicidal ideations.
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
## 2024-03-31 NOTE — ED ADULT NURSE NOTE - CHIEF COMPLAINT QUOTE
KRISTIN from [facility] for suicidal ideations, as per pt "I have been having suicidal thoughts for 1 month now, I see a psychiatrist but I've just been increasingly depressed and decided to come in." Pt denies plan to hurt herself, denies homicidal ideations.
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
## 2024-03-31 NOTE — CONSULT NOTE ADULT - SUBJECTIVE AND OBJECTIVE BOX
HOSPITALIST CONSULT for IPP History and Physical     CRISTI MONTGOMERY  38y, Female  Allergy: Clindamycin Phosphate (Unknown)  amoxicillin (Hives)  gabapentin (Other)  Influenza Virus Vaccine, H5N1, Inactivated (Other)  Fluad 0.5 ML ODETTE (Unknown)  clindamycin (Hives; Nephrotoxicity)  Cipro (Other)  penicillins (Hives)  Ceclor (Rash)      CHIEF COMPLAINT:     HPI:    HPI:  Cristi Montgomery is a 38 year old  female, domiciled at [facility],  past medical history of T1D, diabetic neuropathy, HTN, chronic back pain, migraine headaches, pseudoseizures, with past psychiatric history of depression/anxiety and cluster B personality disorder, 8 previous psychiatric admissions (most recently discharged from [facility] on 23), multiple previous suicide attempts (remote hx of once by taking a capful of acetone, another by drinking hydrogen peroxide, another attempt by taking half a bottle of ibuprofen; most recent SA about a year ago by ingesting Pine-Sol, no known medical consequences), no legal or violence hx, denies substance use, BIBEMS activated by residence facility after patient expressed suicidal ideation.    On assessment, patient is tearful at times, cooperative. She reports a 2-month history of worsening depressive symptoms after having an argument with another resident at her facility to told her "you're trash, slit your wrists." She reports ruminating on these thoughts and thinking about how her mother had made similar comments about her being trash in the past. She also reports psychosocial stressors of having covid in  which disrupted her treatment in therapy, having arguments with her mother about a partner she sees, missing her father who  ,. She reports that while she still showers and cleans her space, she has been sleeping upwards of 16 hours a day for the past month, binge-eating at times, feeling "no robert" in life. Reports seeing a new psychiatrist Dr. Curiel Tuesday, but has not resumed therapy. Reports adherence to medications, states she feels Lamictal "just knocks me out" and Lexapro is ineffective (per [employer] note, plan was to check amitriptyline level and cross titrate to different SSRI). Denies any substance use, AVH, paranoia, grandiosity, decreased need for sleep, homicidal ideation. Patient ultimately states she is unsure if she would be able to remain safe if discharged and is not willing to safety plan at this time. Requests admission.     Spoke to mother Argelia at bedside --she reports the patient has been sleeping much more, is tearful, does not appear to be caring for herself as much. Reports safety concerns despite means restriction of detergent/soap/astringents. Reports patient expressed suicidal ideation two days ago and last night stating she "wants to trade places" with her  father.     Patient noted to have insulin pump—reports it was adjusted two weeks ago as her glucose was routinely in the 400s, recently in 300s. Pump instructions not included in paperwork sent from residence.    Please see ED Behavorial Health Note for collateral obtained from residence; reviewed by writer. (31 Mar 2024 13:00)    FAMILY HISTORY:    PAST MEDICAL & SURGICAL HISTORY:  Neuropathy  right lower extremity, vaginal      Type 1 diabetes      Degenerative disc disease, thoracic      Urinary tract infection, recurrent      Gastroesophageal reflux disease, esophagitis presence not specified      Intractable headache, unspecified chronicity pattern, unspecified headache type      Major depressive disorder with single episode, in full remission  previously treated with zyprexa, celexa and lexapro      Tremor of right hand      Tachycardia      Spinal stenosis      No significant past surgical history          SOCIAL HISTORY  Social History:  denies tobacco, drugs, alcohol (01 Dec 2022 00:54)      Home Medications:  pantoprazole 40 mg oral delayed release tablet: 1 tab(s) orally once a day (before a meal) (2023 12:05)      ROS  General: Denies fevers, chills, nightsweats, weight loss  HEENT: Denies headache, rhinorrhea, sore throat, eye pain  CV: Denies CP, palpitations  PULM: Denies SOB, cough  GI: Denies abdominal pain, diarrhea  : Denies dysuria, hematuria  MSK: Denies arthralgias  SKIN: Denies rash   NEURO: Denies paresthesias, weakness  PSYCH: Denies depression    VITALS:  T(F): 98.2, Max: 98.2 (24 @ 10:27)  HR: 71  BP: 134/74  RR: 16Vital Signs Last 24 Hrs  T(C): 36.8 (31 Mar 2024 10:27), Max: 36.8 (31 Mar 2024 10:27)  T(F): 98.2 (31 Mar 2024 10:27), Max: 98.2 (31 Mar 2024 10:27)  HR: 71 (31 Mar 2024 14:48) (71 - 94)  BP: 134/74 (31 Mar 2024 14:48) (134/74 - 135/76)  BP(mean): --  RR: 16 (31 Mar 2024 10:27) (16 - 16)  SpO2: 97% (31 Mar 2024 10:27) (97% - 97%)    Parameters below as of 31 Mar 2024 10:27  Patient On (Oxygen Delivery Method): room air        PHYSICAL EXAM:  Gen: NAD, resting in bed  HEENT: Normocephalic, atraumatic  Neck: supple, no lymphadenopathy  CV: Regular rate & regular rhythm  Lungs: CTABL no wheeze  Abdomen: Soft, NTND+ BS present  Ext: Warm, well perfused no CCE  Neuro: non focal, awake, CN II-XII intact   Skin: no rash, no erythema  Psych: no SI, HI, Hallucination     TESTS & MEASUREMENTS:                        13.2   11.36 )-----------( 448      ( 31 Mar 2024 11:10 )             40.9     03-31    132<L>  |  96<L>  |  11  ----------------------------<  353<H>  4.0   |  25  |  0.7    Ca    9.1      31 Mar 2024 11:10    TPro  6.9  /  Alb  4.0  /  TBili  <0.2  /  DBili  x   /  AST  30  /  ALT  74<H>  /  AlkPhos  164<H>  03-31      LIVER FUNCTIONS - ( 31 Mar 2024 11:10 )  Alb: 4.0 g/dL / Pro: 6.9 g/dL / ALK PHOS: 164 U/L / ALT: 74 U/L / AST: 30 U/L / GGT: x           Urinalysis Basic - ( 31 Mar 2024 11:10 )    Color: x / Appearance: x / SG: x / pH: x  Gluc: 353 mg/dL / Ketone: x  / Bili: x / Urobili: x   Blood: x / Protein: x / Nitrite: x   Leuk Esterase: x / RBC: x / WBC x   Sq Epi: x / Non Sq Epi: x / Bacteria: x            QRS axis to [] ° and NSR at a rate of [] BPM. There was no atrial enlargement. There was no ventricular hypertrophy. There were no ST-T changes and all intervals were normal.      INFECTIOUS DISEASES TESTING      RADIOLOGY & ADDITIONAL TESTS:  I have personally reviewed the last Chest xray  CXR      CT      CARDIOLOGY TESTING      MEDICATIONS  (STANDING):    MEDICATIONS  (PRN):      ANTIBIOTICS:      All available historical data has been reviewed    ASSESSMENT  38y F admitted with     PROBLEMS   HOSPITALIST CONSULT for IPP History and Physical     CRISTI MONTGOMERY  38y, Female  Allergy: Clindamycin Phosphate (Unknown)  amoxicillin (Hives)  gabapentin (Other)  Influenza Virus Vaccine, H5N1, Inactivated (Other)  Fluad 0.5 ML ODETTE (Unknown)  clindamycin (Hives; Nephrotoxicity)  Cipro (Other)  penicillins (Hives)  Ceclor (Rash)      CHIEF COMPLAINT:     HPI:    HPI:  Cristi Montgomery is a 38 year old  female, domiciled at [facility],  past medical history of T1D, diabetic neuropathy, HTN, chronic back pain, migraine headaches, pseudoseizures, with past psychiatric history of depression/anxiety and cluster B personality disorder, 8 previous psychiatric admissions (most recently discharged from [facility] on 23), multiple previous suicide attempts (remote hx of once by taking a capful of acetone, another by drinking hydrogen peroxide, another attempt by taking half a bottle of ibuprofen; most recent SA about a year ago by ingesting Pine-Sol, no known medical consequences), no legal or violence hx, denies substance use, BIBEMS activated by residence facility after patient expressed suicidal ideation.    On assessment, patient is tearful at times, cooperative. She reports a 2-month history of worsening depressive symptoms after having an argument with another resident at her facility to told her "you're trash, slit your wrists." She reports ruminating on these thoughts and thinking about how her mother had made similar comments about her being trash in the past. She also reports psychosocial stressors of having covid in  which disrupted her treatment in therapy, having arguments with her mother about a partner she sees, missing her father who  ,. She reports that while she still showers and cleans her space, she has been sleeping upwards of 16 hours a day for the past month, binge-eating at times, feeling "no robert" in life. Reports seeing a new psychiatrist Dr. Curiel Tuesday, but has not resumed therapy. Reports adherence to medications, states she feels Lamictal "just knocks me out" and Lexapro is ineffective (per [employer] note, plan was to check amitriptyline level and cross titrate to different SSRI). Denies any substance use, AVH, paranoia, grandiosity, decreased need for sleep, homicidal ideation. Patient ultimately states she is unsure if she would be able to remain safe if discharged and is not willing to safety plan at this time. Requests admission.     Spoke to mother Argelia at bedside --she reports the patient has been sleeping much more, is tearful, does not appear to be caring for herself as much. Reports safety concerns despite means restriction of detergent/soap/astringents. Reports patient expressed suicidal ideation two days ago and last night stating she "wants to trade places" with her  father.     Patient noted to have insulin pump—reports it was adjusted two weeks ago as her glucose was routinely in the 400s, recently in 300s. Pump instructions not included in paperwork sent from residence.    Please see ED Behavorial Health Note for collateral obtained from residence; reviewed by writer. (31 Mar 2024 13:00)    FAMILY HISTORY:    PAST MEDICAL & SURGICAL HISTORY:  Neuropathy  right lower extremity, vaginal      Type 1 diabetes      Degenerative disc disease, thoracic      Urinary tract infection, recurrent      Gastroesophageal reflux disease, esophagitis presence not specified      Intractable headache, unspecified chronicity pattern, unspecified headache type      Major depressive disorder with single episode, in full remission  previously treated with zyprexa, celexa and lexapro      Tremor of right hand      Tachycardia      Spinal stenosis      No significant past surgical history    states glucose hasn't been well controlled at home from eating junk food, for a couple days has had minor bleeding when wiping after defecating, no other medical complaints      SOCIAL HISTORY  Social History:  denies tobacco, drugs, alcohol (01 Dec 2022 00:54)      Home Medications:  pantoprazole 40 mg oral delayed release tablet: 1 tab(s) orally once a day (before a meal) (2023 12:05)      ROS  General: Denies fevers, chills, nightsweats, weight loss  HEENT: Denies headache, rhinorrhea, sore throat, eye pain  CV: Denies CP, palpitations  PULM: Denies SOB, cough  GI: Denies abdominal pain, diarrhea  : Denies dysuria, hematuria  MSK: Denies arthralgias  SKIN: Denies rash   NEURO: Denies paresthesias, weakness  PSYCH: Denies depression    VITALS:  T(F): 98.2, Max: 98.2 (24 @ 10:27)  HR: 71  BP: 134/74  RR: 16Vital Signs Last 24 Hrs  T(C): 36.8 (31 Mar 2024 10:27), Max: 36.8 (31 Mar 2024 10:27)  T(F): 98.2 (31 Mar 2024 10:27), Max: 98.2 (31 Mar 2024 10:27)  HR: 71 (31 Mar 2024 14:48) (71 - 94)  BP: 134/74 (31 Mar 2024 14:48) (134/74 - 135/76)  BP(mean): --  RR: 16 (31 Mar 2024 10:27) (16 - 16)  SpO2: 97% (31 Mar 2024 10:27) (97% - 97%)    Parameters below as of 31 Mar 2024 10:27  Patient On (Oxygen Delivery Method): room air        PHYSICAL EXAM:  Gen: NAD, resting in bed  HEENT: Normocephalic, atraumatic  Neck: supple, no lymphadenopathy  CV: Regular rate & regular rhythm  Lungs: CTABL no wheeze  Abdomen: Soft, NTND+ BS present  Ext: Warm, well perfused no CCE  Neuro: non focal, awake, CN II-XII intact   Skin: no rash, no erythema  Psych: no SI, HI, Hallucination     TESTS & MEASUREMENTS:                        13.2   11.36 )-----------( 448      ( 31 Mar 2024 11:10 )             40.9     03-    132<L>  |  96<L>  |  11  ----------------------------<  353<H>  4.0   |  25  |  0.7    Ca    9.1      31 Mar 2024 11:10    TPro  6.9  /  Alb  4.0  /  TBili  <0.2  /  DBili  x   /  AST  30  /  ALT  74<H>  /  AlkPhos  164<H>  03-31      LIVER FUNCTIONS - ( 31 Mar 2024 11:10 )  Alb: 4.0 g/dL / Pro: 6.9 g/dL / ALK PHOS: 164 U/L / ALT: 74 U/L / AST: 30 U/L / GGT: x           Urinalysis Basic - ( 31 Mar 2024 11:10 )    Color: x / Appearance: x / SG: x / pH: x  Gluc: 353 mg/dL / Ketone: x  / Bili: x / Urobili: x   Blood: x / Protein: x / Nitrite: x   Leuk Esterase: x / RBC: x / WBC x   Sq Epi: x / Non Sq Epi: x / Bacteria: x            QRS axis to [] ° and NSR at a rate of [] BPM. There was no atrial enlargement. There was no ventricular hypertrophy. There were no ST-T changes and all intervals were normal.      INFECTIOUS DISEASES TESTING      RADIOLOGY & ADDITIONAL TESTS:  I have personally reviewed the last Chest xray  CXR      CT      CARDIOLOGY TESTING      MEDICATIONS  (STANDING):    MEDICATIONS  (PRN):      ANTIBIOTICS:      All available historical data has been reviewed

## 2024-03-31 NOTE — ED BEHAVIORAL HEALTH ASSESSMENT NOTE - NSBHATTESTCOMMENTATTENDFT_PSY_A_CORE
Patient also seen separately by Attending; mother at bedside. Patient linear, goal directed, gives reliable history. Suspecting underlying character pathology which should warrant update on Diagnosis list. Admit 9.13

## 2024-03-31 NOTE — BH PATIENT PROFILE - HOME MEDICATIONS
benzonatate 100 mg oral capsule , 1 cap(s) orally 3 times a day as needed for  cough 8AM, 12PM, and 5PM.  sodium chloride 0.9% nasal spray , 1 spray(s) intranasally every hour as needed for  congestion  ciprofloxacin 0.2% otic solution , 1 each in each affected ear every 12 hours  sucralfate 1 g/10 mL oral suspension , 10 milliliter(s) orally 3 times a day  levoFLOXacin 750 mg oral tablet , 1 tab(s) orally once a day  aluminum hydroxide/magnesium hydroxide/simethicone 400 mg-400 mg-40 mg/5 mL oral suspension , 10 milliliter(s) orally every 8 hours as needed for abdominal pain take after meal  acetaminophen 325 mg oral tablet , 1 tab(s) orally every 8 hours as needed for Severe Pain (7 - 10) MDD: 975 mg  metoprolol tartrate 25 mg oral tablet , 1 tab(s) orally 2 times a day Continue to take as prescribed until told otherwise by your provider MDD: 50 mg  hydrOXYzine hydrochloride 50 mg oral tablet , 1 tab(s) orally every 6 hours as needed for anxiety and pseudoseizures Continue to take as prescribed until told otherwise by your provider MDD: 200 mg  pantoprazole 40 mg oral delayed release tablet , 1 tab(s) orally once a day (before a meal)  escitalopram 20 mg oral tablet , 1 tab(s) orally once a day MDD: 20 mg  amitriptyline 75 mg oral tablet , 1 tab(s) orally once a day (at bedtime) MDD: 75 mg  lamoTRIgine 150 mg oral tablet , 1 tab(s) orally once a day (at bedtime) MDD: 150 mg  ferrous sulfate 325 mg (65 mg elemental iron) oral tablet , 1 tab(s) orally once a day x 14 days Continue to take as prescribed until told otherwise by your provider  ondansetron 4 mg oral tablet, disintegrating , 1 tab(s) orally 3 times a day x 14 days as needed for  nausea Continue to take as prescribed until told otherwise by your provider  albuterol 90 mcg/inh inhalation powder , 1 puff(s) inhaled every 6 hours as needed for  bronchospasm

## 2024-03-31 NOTE — BH PATIENT PROFILE - FALL HARM RISK - UNIVERSAL INTERVENTIONS
Bed in lowest position, wheels locked, appropriate side rails in place/Call bell, personal items and telephone in reach/Instruct patient to call for assistance before getting out of bed or chair/Non-slip footwear when patient is out of bed/[place] to call system/Physically safe environment - no spills, clutter or unnecessary equipment/Purposeful Proactive Rounding/Room/bathroom lighting operational, light cord in reach

## 2024-03-31 NOTE — ED BEHAVIORAL HEALTH ASSESSMENT NOTE - DESCRIPTION
see HPI T1D -- diagnosed at 16 mo, diabetic neuropathy, HTN, chronic back pain, migraine headaches Patient calm, cooperative in triage.     Vital Signs Last 24 Hrs  T(C): 36.8 (31 Mar 2024 10:27), Max: 36.8 (31 Mar 2024 10:27)  T(F): 98.2 (31 Mar 2024 10:27), Max: 98.2 (31 Mar 2024 10:27)  HR: 94 (31 Mar 2024 10:27) (94 - 94)  BP: 135/76 (31 Mar 2024 10:27) (135/76 - 135/76)  BP(mean): --  RR: 16 (31 Mar 2024 10:27) (16 - 16)  SpO2: 97% (31 Mar 2024 10:27) (97% - 97%)    Parameters below as of 31 Mar 2024 10:27  Patient On (Oxygen Delivery Method): room air

## 2024-03-31 NOTE — ED BEHAVIORAL HEALTH ASSESSMENT NOTE - HPI (INCLUDE ILLNESS QUALITY, SEVERITY, DURATION, TIMING, CONTEXT, MODIFYING FACTORS, ASSOCIATED SIGNS AND SYMPTOMS)
Ania Mie is a 38 year old  female, domiciled at [facility],  past medical history of T1D, diabetic neuropathy, HTN, chronic back pain, migraine headaches, pseudoseizures, with past psychiatric history of depression/anxiety and cluster B personality disorder, 8 previous psychiatric admissions (most recently discharged from [facility] on 23), multiple previous suicide attempts (remote hx of once by taking a capful of acetone, another by drinking hydrogen peroxide, another attempt by taking half a bottle of ibuprofen; most recent SA about a year ago by ingesting Pine-Sol, no known medical consequences), no legal or violence hx, denies substance use, BIBEMS activated by residence facility after patient expressed suicidal ideation.    On assessment, patient is tearful at times, cooperative. She reports a 2-month history of worsening depressive symptoms after having an argument with another resident at her facility to told her "you're trash, slit your wrists." She reports ruminating on these thoughts and thinking about how her mother had made similar comments about her being trash in the past. She also reports psychosocial stressors of having covid in  which disrupted her treatment in therapy, having arguments with her mother about a partner she sees, missing her father who  ,. She reports that while she still showers and cleans her space, she has been sleeping upwards of 16 hours a day for the past month, binge-eating at times, feeling "no robert" in life. Reports seeing a new psychiatrist Dr. Curiel Tu, but has not resumed therapy. Reports adherence to medications, states she feels Lamictal "just knocks me out" and Lexapro is ineffective (per [employer] note, plan was to check amitriptyline level and cross titrate to different SSRI). Denies any substance use, AVH, paranoia, grandiosity, decreased need for sleep, homicidal ideation. Patient ultimately states she is unsure if she would be able to remain safe if discharged and is not willing to safety plan at this time. Requests admission.     Spoke to mother Argelia at bedside --she reports the patient has been sleeping much more, is tearful, does not appear to be caring for herself as much. Reports safety concerns despite means restriction of detergent/soap/astringents. Reports patient expressed suicidal ideation two days ago and last night stating she "wants to trade places" with her  father.     Patient noted to have insulin pump—reports it was adjusted two weeks ago as her glucose was routinely in the 400s, recently in 300s. Pump instructions not included in paperwork sent from residence.    Please see ED Behavorial Health Note for collateral obtained from residence; reviewed by writer.

## 2024-03-31 NOTE — ED PROVIDER NOTE - CLINICAL SUMMARY MEDICAL DECISION MAKING FREE TEXT BOX
38-year-old female, living  [facility], hx of depression, unemployed here endorsing SI saying she has thoughts about harming herself. here pt calm cooperative, medically cleared. Voluntary psych admission

## 2024-03-31 NOTE — ED BEHAVIORAL HEALTH ASSESSMENT NOTE - OTHER PAST PSYCHIATRIC HISTORY (INCLUDE DETAILS REGARDING ONSET, COURSE OF ILLNESS, INPATIENT/OUTPATIENT TREATMENT)
Multiple previous psychiatric admissions (most recent to [facility] in June 2023)  In outpatient treatment with Dr. Philippe (psychiatrist), Kylee (therapist)  Currently on Lexapro, Amitriptyline, and Lamictal  Multiple past suicidal gestures

## 2024-03-31 NOTE — ED BEHAVIORAL HEALTH ASSESSMENT NOTE - SUMMARY
Ania Mei is a 38 year old  female, domiciled at [facility],  past medical history of T1D, diabetic neuropathy, HTN, chronic back pain, migraine headaches, pseudoseizures, with past psychiatric history of depression/anxiety and cluster B personality disorder, 8 previous psychiatric admissions (most recently discharged from [facility] on 6/13/23), multiple previous suicide attempts (remote hx of once by taking a capful of acetone, another by drinking hydrogen peroxide, another attempt by taking half a bottle of ibuprofen; most recent SA about a year ago by ingesting Pine-Sol, no known medical consequences), no legal or violence hx, denies substance use, BIBEMS activated by residence facility after patient expressed suicidal ideation. Ania Mei is a 38 year old  female, domiciled at [facility],  past medical history of T1D, diabetic neuropathy, HTN, chronic back pain, migraine headaches, pseudoseizures, with past psychiatric history of depression/anxiety and cluster B personality disorder, 8 previous psychiatric admissions (most recently discharged from [facility] on 6/13/23), multiple previous suicide attempts (remote hx of once by taking a capful of acetone, another by drinking hydrogen peroxide, another attempt by taking half a bottle of ibuprofen; most recent SA about a year ago by ingesting Pine-Sol, no known medical consequences), no legal or violence hx, denies substance use, BIBEMS activated by residence facility after patient expressed suicidal ideation.    On assessment, patient appears with worsening depressive symptoms x2 months, passive suicidal ideation, unable to engage in safety planning, with reported adherence to medication management but with recent disengagement from outpatient care. She denies any AVH, does not appear acutely manic or psychotic, would likely benefit from inpatient psychiatric hospitalization for medication management of symptoms. Telepsychiatry recommends:    PLAN  - Admit on 9.13 Voluntary Legal status once recommendations re: diabetes/ insulin pump   - Continue home medication (med rec as above from nursing home)   - Lexapro 20mg daily, Lamictal 50mg QHS + medical medications  - No plans to acutely harm herself in the hospital, routine observation appropriate   - PRNs for agitation: Ativan 2mg + Benadryl 50mg PO/IM q4h Ania Mei is a 38 year old  female, domiciled at [facility],  past medical history of T1D, diabetic neuropathy, HTN, chronic back pain, migraine headaches, pseudoseizures, with past psychiatric history of depression/anxiety and cluster B personality disorder, 8 previous psychiatric admissions (most recently discharged from [facility] on 6/13/23), multiple previous suicide attempts (remote hx of once by taking a capful of acetone, another by drinking hydrogen peroxide, another attempt by taking half a bottle of ibuprofen; most recent SA about a year ago by ingesting Pine-Sol, no known medical consequences), no legal or violence hx, denies substance use, BIBEMS activated by residence facility after patient expressed suicidal ideation. On assessment, patient appears with worsening depressive symptoms x2 months, passive suicidal ideation, unable to engage in safety planning, with reported adherence to medication management but with recent disengagement from outpatient care. She denies any AVH, does not appear acutely manic or psychotic, would likely benefit from inpatient psychiatric hospitalization for medication management of symptoms. Telepsychiatry recommends:    PLAN  - Admit on 9.13 Voluntary Legal status once recommendations re: diabetes/ insulin pump   - Continue home medication (med rec as above from nursing home)   - Lexapro 20mg daily, Lamictal 50mg QHS + medical medications  - No plans to acutely harm herself in the hospital, routine observation appropriate   - PRNs for agitation: Ativan 2mg + Benadryl 50mg PO/IM q4h

## 2024-03-31 NOTE — ED BEHAVIORAL HEALTH ASSESSMENT NOTE - CURRENT MEDICATION
Ferrous sulfate 325mg daily  Lexapro 20mg daily  Protonix 40mg daily  Lopressor 25mg TID  amitriptyline 75mg QHS  Lamictal 50mg QHS  Albuterol pump q8h prn  Zofran 4mg q8h prn  Motrin prn  Insulin pump

## 2024-03-31 NOTE — ED BEHAVIORAL HEALTH ASSESSMENT NOTE - RISK ASSESSMENT
Chronic risk factors include psychiatric diagnoses, previous psychiatric admissions, previous suicide attempts, chronic medical conditions   Acute risk factors include inability to safety plan, depressed mood, poor frustration tolerance, chronic pain  Protective factors include residential stability, sobriety, willingness to engage in treatment

## 2024-03-31 NOTE — ED BEHAVIORAL HEALTH NOTE - BEHAVIORAL HEALTH NOTE
Patient gives permission to obtain collateral from _____:     (  ) Yes     (X)  No     Rationale for overriding objection     (  ) Lack of capacity. Details: ________     ( X ) Assessing risk of danger to self/others. Details: ________     Rationale for selecting specific collateral source     (  ) Potential to impact risk of danger to self/others and no alternative equivalent. Details:       ========================     FOR EACH COLLATERAL     ========================     NAME: AMNA     NUMBER: [phone]     RELATIONSHIP: Nurse at [employer]      RELIABILITY: Fair. Collateral reported nurses during the week could provide additional information on pt.     COMMENTS:      ========================     PATIENT DEMOGRAPHICS:      ========================     HPI     ========================     BASELINE FUNCTIONING: Pt is a 38-year-old female, domiciled at [facility], hx of depression, unemployed, endorsing SI saying she has thoughts about harming herself, no plan, hx of inpt psych admissions; last one, per collateral, was July of last year (believes pt was admitted to [facility]), no reported HI/AVH, pmhx type 1 diabetes and IBS. At baseline, collateral reported that pt can maintain insulin by herself and is independent.    DATE HPI STARTED: Approx a few days ago     DECOMPENSATION: Per collateral, pt began decompensating approx a few days ago. Collateral reported that pt came in this morning w/ mom hyperventilating and sweating endorsing SI saying she has thoughts of harming herself and feels an impending doom. Pt did not express a plan. Per collateral, pt expressed she has been feeling depressed for a few months and that it has been amplified over the past few days due to verbal altercation that occurred a few days ago w/ another resident. This resident reportedly made a comment about pt’s depression. Collateral stated that pt did not report HI/AVH.      SUICIDALITY: Pt endorsing SI saying she has thoughts of harming herself and feels an impending doom. Pt did not express a plan.     VIOLENCE: ? Information unknown by collateral     SUBSTANCE: ?Information unknown by collateral      ========================     PAST PSYCHIATRIC HISTORY     ========================     DATE PAST PSYCHIATRIC HISTORY STARTED:     MAIN PSYCHIATRIC DIAGNOSIS: Information unknown by collateral     PSYCHIATRIC HOSPITALIZATIONS: Reportedly admitted to [facility] last year. Collateral believes pt has additional hx of previous of admissions but unsure of details.     PRIOR ILLNESS: Pt seen by psychiatrist every 2 weeks     SUICIDALITY: Collateral reports that last time pt endorsed SI threatening to harm herself was approx July of last year, then saw psychiatrist and received alteration to meds (unsure of specifics). Collateral believes pt was then admitted to psych at [facility].      VIOLENCE: ?None reported     SUBSTANCE USE: Information unknown by collateral       ==============     OTHER HISTORY     ==============     CURRENT MEDICATION: Collateral stated that pt is taking a few medications but unsure of details. Faxing [facility] hub med list.     MEDICAL HISTORY: Type 1?diabetes (collateral reported that pt admitted to med occasionally), IBS     ALLERGIES: Collateral stated that pt has a few allergies but unsure of details     LEGAL ISSUES: Information unknown by collateral  ?     FIREARM ACCESS: None      SOCIAL HISTORY: Information unknown by collateral  ?     FAMILY HISTORY: Information unknown by collateral  ?     DEVELOPMENTAL HISTORY: Information unknown by collateral

## 2024-04-01 DIAGNOSIS — F60.9 PERSONALITY DISORDER, UNSPECIFIED: ICD-10-CM

## 2024-04-01 LAB
A1C WITH ESTIMATED AVERAGE GLUCOSE RESULT: 12.1 % — HIGH (ref 4–5.6)
ALBUMIN SERPL ELPH-MCNC: 4 G/DL — SIGNIFICANT CHANGE UP (ref 3.5–5.2)
ALP SERPL-CCNC: 172 U/L — HIGH (ref 30–115)
ALT FLD-CCNC: 74 U/L — HIGH (ref 0–41)
AMITRIP SERPL-MCNC: 115 NG/ML
AMITRIP+NOR SERPL-MCNC: 160 NG/ML
ANION GAP SERPL CALC-SCNC: 17 MMOL/L — HIGH (ref 7–14)
AST SERPL-CCNC: 27 U/L — SIGNIFICANT CHANGE UP (ref 0–41)
BILIRUB SERPL-MCNC: 0.3 MG/DL — SIGNIFICANT CHANGE UP (ref 0.2–1.2)
BUN SERPL-MCNC: 13 MG/DL — SIGNIFICANT CHANGE UP (ref 10–20)
CALCIUM SERPL-MCNC: 9.3 MG/DL — SIGNIFICANT CHANGE UP (ref 8.4–10.5)
CHLORIDE SERPL-SCNC: 96 MMOL/L — LOW (ref 98–110)
CO2 SERPL-SCNC: 21 MMOL/L — SIGNIFICANT CHANGE UP (ref 17–32)
CREAT SERPL-MCNC: 0.8 MG/DL — SIGNIFICANT CHANGE UP (ref 0.7–1.5)
EGFR: 97 ML/MIN/1.73M2 — SIGNIFICANT CHANGE UP
ESTIMATED AVERAGE GLUCOSE: 301 MG/DL — HIGH (ref 68–114)
GAS PNL BLDA: SIGNIFICANT CHANGE UP
GLUCOSE BLDC GLUCOMTR-MCNC: 337 MG/DL — HIGH (ref 70–99)
GLUCOSE BLDC GLUCOMTR-MCNC: 402 MG/DL — HIGH (ref 70–99)
GLUCOSE BLDC GLUCOMTR-MCNC: 422 MG/DL — HIGH (ref 70–99)
GLUCOSE BLDC GLUCOMTR-MCNC: 424 MG/DL — HIGH (ref 70–99)
GLUCOSE SERPL-MCNC: 411 MG/DL — HIGH (ref 70–99)
HCT VFR BLD CALC: 40.9 % — SIGNIFICANT CHANGE UP (ref 37–47)
HGB BLD-MCNC: 13.4 G/DL — SIGNIFICANT CHANGE UP (ref 12–16)
LACTATE SERPL-SCNC: 1.9 MMOL/L — SIGNIFICANT CHANGE UP (ref 0.7–2)
MCHC RBC-ENTMCNC: 27 PG — SIGNIFICANT CHANGE UP (ref 27–31)
MCHC RBC-ENTMCNC: 32.8 G/DL — SIGNIFICANT CHANGE UP (ref 32–37)
MCV RBC AUTO: 82.3 FL — SIGNIFICANT CHANGE UP (ref 81–99)
NORTRIP SERPL-MCNC: 45 NG/ML
NRBC # BLD: 0 /100 WBCS — SIGNIFICANT CHANGE UP (ref 0–0)
PLATELET # BLD AUTO: 428 K/UL — HIGH (ref 130–400)
PMV BLD: 9.9 FL — SIGNIFICANT CHANGE UP (ref 7.4–10.4)
POTASSIUM SERPL-MCNC: 5.1 MMOL/L — HIGH (ref 3.5–5)
POTASSIUM SERPL-SCNC: 5.1 MMOL/L — HIGH (ref 3.5–5)
PROT SERPL-MCNC: 7.1 G/DL — SIGNIFICANT CHANGE UP (ref 6–8)
RBC # BLD: 4.97 M/UL — SIGNIFICANT CHANGE UP (ref 4.2–5.4)
RBC # FLD: 14.9 % — HIGH (ref 11.5–14.5)
SODIUM SERPL-SCNC: 134 MMOL/L — LOW (ref 135–146)
WBC # BLD: 11.9 K/UL — HIGH (ref 4.8–10.8)
WBC # FLD AUTO: 11.9 K/UL — HIGH (ref 4.8–10.8)

## 2024-04-01 PROCEDURE — 93010 ELECTROCARDIOGRAM REPORT: CPT

## 2024-04-01 PROCEDURE — 99234 HOSP IP/OBS SM DT SF/LOW 45: CPT

## 2024-04-01 RX ORDER — PANTOPRAZOLE SODIUM 20 MG/1
40 TABLET, DELAYED RELEASE ORAL
Refills: 0 | Status: DISCONTINUED | OUTPATIENT
Start: 2024-04-01 | End: 2024-04-12

## 2024-04-01 RX ORDER — FERROUS SULFATE 325(65) MG
325 TABLET ORAL DAILY
Refills: 0 | Status: DISCONTINUED | OUTPATIENT
Start: 2024-04-01 | End: 2024-04-02

## 2024-04-01 RX ORDER — DIPHENHYDRAMINE HCL 50 MG
50 CAPSULE ORAL EVERY 6 HOURS
Refills: 0 | Status: DISCONTINUED | OUTPATIENT
Start: 2024-04-01 | End: 2024-04-12

## 2024-04-01 RX ORDER — INSULIN LISPRO 100/ML
8 VIAL (ML) SUBCUTANEOUS ONCE
Refills: 0 | Status: COMPLETED | OUTPATIENT
Start: 2024-04-01 | End: 2024-04-01

## 2024-04-01 RX ADMIN — Medication 25 MILLIGRAM(S): at 20:04

## 2024-04-01 RX ADMIN — Medication 6: at 11:52

## 2024-04-01 RX ADMIN — Medication 4: at 08:37

## 2024-04-01 RX ADMIN — INSULIN GLARGINE 20 UNIT(S): 100 INJECTION, SOLUTION SUBCUTANEOUS at 20:03

## 2024-04-01 RX ADMIN — Medication 6: at 16:40

## 2024-04-01 RX ADMIN — Medication 75 MILLIGRAM(S): at 08:39

## 2024-04-01 RX ADMIN — LAMOTRIGINE 50 MILLIGRAM(S): 25 TABLET, ORALLY DISINTEGRATING ORAL at 20:04

## 2024-04-01 RX ADMIN — POLYETHYLENE GLYCOL 3350 17 GRAM(S): 17 POWDER, FOR SOLUTION ORAL at 20:04

## 2024-04-01 RX ADMIN — ESCITALOPRAM OXALATE 20 MILLIGRAM(S): 10 TABLET, FILM COATED ORAL at 08:40

## 2024-04-01 RX ADMIN — SENNA PLUS 2 TABLET(S): 8.6 TABLET ORAL at 20:04

## 2024-04-01 RX ADMIN — Medication 25 MILLIGRAM(S): at 08:40

## 2024-04-01 RX ADMIN — Medication 25 MILLIGRAM(S): at 14:20

## 2024-04-01 RX ADMIN — Medication 8 UNIT(S): at 19:35

## 2024-04-01 NOTE — CHART NOTE - NSCHARTNOTEFT_GEN_A_CORE
A "Code Blue" was called as a staff member found patient unresponsive and stiff. In fact as rapid response team arrived, we were informed that code blue is cancelled but I was requested to see patient. Amongst her extensive history are following diagnoses, pseudoseizure, depression and personality disorder. On my arrival patient  is laying on her side, not talking and resisting my attempt to be placed on her back. Her eye lids are flittering and when I held her left hand above her face, she kept it in place. When respiratory tech came to draw her blood she physically cooperated though she is not talking. FSBS is 337 , EKG shows sinus tachy at 111 but no ischemic changes. ABG also ok. Will order stat CAT of head and CBC, CMP and LA and ask day team to follow up. Suspect catatonia but await results of above A "Code Blue" was called as a staff member found patient unresponsive and stiff. In fact as rapid response team arrived, we were informed that code blue is cancelled but I was requested to see patient. Amongst her extensive history are following diagnoses, pseudoseizure, depression and personality disorder. On my arrival patient  is laying on her side, not talking and resisting my attempt to be placed on her back. Her eye lids are flittering and when I held her left hand above her face, she kept it in place. When respiratory tech came to draw her blood she physically cooperated though she is not talking. FSBS is 337 , EKG shows sinus tachy at 111 but no ischemic changes. ABG also ok. Will order stat CAT of head and CBC, CMP and LA and ask day team to follow up. Suspect catatonia but await results of above. I called mother's number in computer but it was not in service ([phone]) There was no number to call in her adult home paperwork

## 2024-04-01 NOTE — PHYSICAL THERAPY INITIAL EVALUATION ADULT - GENERAL OBSERVATIONS, REHAB EVAL
11:45 - 12:15 Chart reviewed. Order received.  Patient is ok to be  seen for PT,confirmed with RN. pt encountered   Sitting at [facility]   denies pain, and agrees to participate in session, ,NAD.

## 2024-04-01 NOTE — BH TREATMENT PLAN - NSTXDIABINTERRN_PSY_ALL_CORE
RN will monitor fingerstick readings  RN will adminsiter medications  RN will education on diabetic diet

## 2024-04-01 NOTE — CONSULT NOTE ADULT - ASSESSMENT
38y old Female with a PMH Of type 1 DM who presents with a chief complaint of suicidal ideation. She was diagnosed with type 1 diabetes mellitus at 6 months of age patient is currently on a [employer] insulin pump at home uses [employer]. She states that her blood glucose readings at home are very poorly controlledeven after recent changes to her settings, attributes most of the poor blood glucose control to her diet and snacking    #Type 1 diabetes mellitus  -Under poor control with A1c 12  -Currently getting only 20 units of basal insulin recommend to increase basal insulin to 20 units twice a day  -Currently only on sliding scale insulin blood glucose readings in the 400s would recommend to start prandial insulin with 10 units with each meal continue current sliding scale with the same, will need further titration based on readings.   -On discharge will need basal bolus insulin or insulin pump as long as cleared by psychiatry, may also be a candidate for GLP-1 analog as patient has poor overall eating habits may help decrease overall insulin burden, can discuss outpatient  , follows with Dr. Galvez    Home settings    Basal Rate   Start Time: 12 am - Basal: 2.5 units/hour    Start Time: 12pm Basal: 3.0 units/hour          Carb Ratios   Start Time: 12am Carb Ratios:  10 grams/unit   Start Time: 7 am Carb Ratios: 7 grams/unit   Start Time: 2pm Carb Ratios: 7 grams/unit          Sensitvity   Start Time: Sensitivity:  40 mg/dL/U            BG Target Ranges   Start Time: BG Target Ranges:  120 mg/dL

## 2024-04-01 NOTE — PHYSICAL THERAPY INITIAL EVALUATION ADULT - NSACTIVITYREC_GEN_A_PT
Patient  Independent W/ Bed mobility , transfer and  Supervision W/ ambulation , , Recommended  RW for safety , Pt use [facility] at [facility] . Skill PT not recommended at this time  Am Pac Mobility Score : 22 , reconsult Skill PT in future as appropriated.

## 2024-04-01 NOTE — PHYSICAL THERAPY INITIAL EVALUATION ADULT - THERAPY FREQUENCY, PT EVAL
3-5x/week Skill PT not recommended at  this time , Reconsult Skill PT in future as appropriated/1-2x/week

## 2024-04-01 NOTE — BH INPATIENT PSYCHIATRY ASSESSMENT NOTE - VIOLENCE RISK FACTORS:
set-up required/verbal cues/nonverbal cues (demo/gestures)/1 person assist Impulsivity/Community stressors that increase the risk of destabilization

## 2024-04-01 NOTE — CONSULT NOTE ADULT - SUBJECTIVE AND OBJECTIVE BOX
Patient is a 38y old Female with a PMH Of type 1 DM who presents with a chief complaint of   Primary diagnosis of Suicidal ideation       Today is hospital day 1d. This morning patient was seen and examined at bedside, resting comfortably in bed.    No acute or major events overnight.    PAST MEDICAL & SURGICAL HISTORY  Neuropathy  right lower extremity, vaginal    Type 1 diabetes    Degenerative disc disease, thoracic    Urinary tract infection, recurrent    Gastroesophageal reflux disease, esophagitis presence not specified    Intractable headache, unspecified chronicity pattern, unspecified headache type    Major depressive disorder with single episode, in full remission  previously treated with zyprexa, celexa and lexapro    Tremor of right hand    Tachycardia    Spinal stenosis    No significant past surgical history      SOCIAL HISTORY:  Social History:      ALLERGIES:  Clindamycin Phosphate (Unknown)  amoxicillin (Hives)  Fluad 0.5 ML ODETTE (Unknown)  clindamycin (Hives; Nephrotoxicity)  penicillins (Hives)  Ceclor (Rash)    MEDICATIONS:  STANDING MEDICATIONS  amitriptyline 75 milliGRAM(s) Oral daily  dextrose 5%. 1000 milliLiter(s) IV Continuous <Continuous>  dextrose 5%. 1000 milliLiter(s) IV Continuous <Continuous>  dextrose 50% Injectable 25 Gram(s) IV Push once  dextrose 50% Injectable 25 Gram(s) IV Push once  dextrose 50% Injectable 12.5 Gram(s) IV Push once  escitalopram 20 milliGRAM(s) Oral daily  ferrous    sulfate 325 milliGRAM(s) Oral daily  glucagon  Injectable 1 milliGRAM(s) IntraMuscular once  insulin glargine Injectable (LANTUS) 20 Unit(s) SubCutaneous at bedtime  insulin lispro (ADMELOG) corrective regimen sliding scale   SubCutaneous three times a day before meals  lamoTRIgine 50 milliGRAM(s) Oral at bedtime  metoprolol tartrate 25 milliGRAM(s) Oral three times a day  pantoprazole    Tablet 40 milliGRAM(s) Oral before breakfast  polyethylene glycol 3350 17 Gram(s) Oral at bedtime  senna 2 Tablet(s) Oral at bedtime    PRN MEDICATIONS  acetaminophen     Tablet .. 650 milliGRAM(s) Oral every 6 hours PRN  albuterol    90 MICROgram(s) HFA Inhaler 2 Puff(s) Inhalation every 6 hours PRN  aluminum hydroxide/magnesium hydroxide/simethicone Suspension 30 milliLiter(s) Oral every 8 hours PRN  benzonatate 100 milliGRAM(s) Oral three times a day PRN  dextrose Oral Gel 15 Gram(s) Oral once PRN  diphenhydrAMINE 50 milliGRAM(s) Oral every 6 hours PRN  lidocaine 2% Gel 1 Application(s) Topical every 6 hours PRN  LORazepam     Tablet 2 milliGRAM(s) Oral every 6 hours PRN  ondansetron    Tablet 4 milliGRAM(s) Oral every 8 hours PRN    VITALS:   T(F): 96.2  HR: 105  BP: 124/79  RR: 18  SpO2: --    PHYSICAL EXAM:  GENERAL: NAD, well-groomed, well-developed  HEAD:  Atraumatic, Normocephalic  EYES: EOMI  NECK: Supple  NERVOUS SYSTEM:  Alert & Oriented X3, non focal   CHEST/LUNG: Clear to auscultation bilaterally; No rales, rhonchi, wheezing, or rubs  HEART: Regular rate and rhythm; No murmurs, rubs, or gallops  ABDOMEN: Soft, Nontender, Nondistended; Bowel sounds present  EXTREMITIES:  2+ Peripheral Pulses, No clubbing, cyanosis, or edema  LYMPH: No lymphadenopathy noted  SKIN: No rashes or lesions  LABS:                        13.4   11.90 )-----------( [id number]      ( 01 Apr 2024 07:30 )             40.9     04-01    134<L>  |  96<L>  |  13  ----------------------------<  411<H>  5.1<H>   |  21  |  0.8    Ca    9.3      01 Apr 2024 07:30    TPro  7.1  /  Alb  4.0  /  TBili  0.3  /  DBili  x   /  AST  27  /  ALT  74<H>  /  AlkPhos  172<H>  04-01      Urinalysis Basic - ( 01 Apr 2024 07:30 )    Color: x / Appearance: x / SG: x / pH: x  Gluc: 411 mg/dL / Ketone: x  / Bili: x / Urobili: x   Blood: x / Protein: x / Nitrite: x   Leuk Esterase: x / RBC: x / WBC x   Sq Epi: x / Non Sq Epi: x / Bacteria: x      ABG - ( 01 Apr 2024 07:18 )  pH, Arterial: 7.39  pH, Blood: x     /  pCO2: 33    /  pO2: 115   / HCO3: 20    / Base Excess: -4.1  /  SaO2: 98.7              Lactate, Blood: 1.9 mmol/L (04-01-24 @ 07:30)          RADIOLOGY:               Patient is a 38y old Female with a PMH Of type 1 DM who presents with a chief complaint of suicidal ideation       Today is hospital day 1d. This morning patient was seen and examined at bedside, resting comfortably in bed.    No acute or major events overnight.    PAST MEDICAL & SURGICAL HISTORY  Neuropathy  right lower extremity, vaginal    Type 1 diabetes    Degenerative disc disease, thoracic    Urinary tract infection, recurrent    Gastroesophageal reflux disease, esophagitis presence not specified    Intractable headache, unspecified chronicity pattern, unspecified headache type    Major depressive disorder with single episode, in full remission  previously treated with zyprexa, celexa and lexapro    Tremor of right hand    Tachycardia    Spinal stenosis    No significant past surgical history      SOCIAL HISTORY:  Social History:      ALLERGIES:  Clindamycin Phosphate (Unknown)  amoxicillin (Hives)  Fluad 0.5 ML ODETTE (Unknown)  clindamycin (Hives; Nephrotoxicity)  penicillins (Hives)  Ceclor (Rash)    MEDICATIONS:  STANDING MEDICATIONS  amitriptyline 75 milliGRAM(s) Oral daily  dextrose 5%. 1000 milliLiter(s) IV Continuous <Continuous>  dextrose 5%. 1000 milliLiter(s) IV Continuous <Continuous>  dextrose 50% Injectable 25 Gram(s) IV Push once  dextrose 50% Injectable 25 Gram(s) IV Push once  dextrose 50% Injectable 12.5 Gram(s) IV Push once  escitalopram 20 milliGRAM(s) Oral daily  ferrous    sulfate 325 milliGRAM(s) Oral daily  glucagon  Injectable 1 milliGRAM(s) IntraMuscular once  insulin glargine Injectable (LANTUS) 20 Unit(s) SubCutaneous at bedtime  insulin lispro (ADMELOG) corrective regimen sliding scale   SubCutaneous three times a day before meals  lamoTRIgine 50 milliGRAM(s) Oral at bedtime  metoprolol tartrate 25 milliGRAM(s) Oral three times a day  pantoprazole    Tablet 40 milliGRAM(s) Oral before breakfast  polyethylene glycol 3350 17 Gram(s) Oral at bedtime  senna 2 Tablet(s) Oral at bedtime    PRN MEDICATIONS  acetaminophen     Tablet .. 650 milliGRAM(s) Oral every 6 hours PRN  albuterol    90 MICROgram(s) HFA Inhaler 2 Puff(s) Inhalation every 6 hours PRN  aluminum hydroxide/magnesium hydroxide/simethicone Suspension 30 milliLiter(s) Oral every 8 hours PRN  benzonatate 100 milliGRAM(s) Oral three times a day PRN  dextrose Oral Gel 15 Gram(s) Oral once PRN  diphenhydrAMINE 50 milliGRAM(s) Oral every 6 hours PRN  lidocaine 2% Gel 1 Application(s) Topical every 6 hours PRN  LORazepam     Tablet 2 milliGRAM(s) Oral every 6 hours PRN  ondansetron    Tablet 4 milliGRAM(s) Oral every 8 hours PRN    VITALS:   T(F): 96.2  HR: 105  BP: 124/79  RR: 18  SpO2: --    PHYSICAL EXAM:  GENERAL: NAD, well-groomed, well-developed  HEAD:  Atraumatic, Normocephalic  EYES: EOMI  NECK: Supple  NERVOUS SYSTEM:  Alert & Oriented X3, non focal   CHEST/LUNG: Clear to auscultation bilaterally; No rales, rhonchi, wheezing, or rubs  HEART: Regular rate and rhythm; No murmurs, rubs, or gallops  ABDOMEN: Soft, Nontender, Nondistended; Bowel sounds present  EXTREMITIES:  2+ Peripheral Pulses, No clubbing, cyanosis, or edema  LYMPH: No lymphadenopathy noted  SKIN: No rashes or lesions  LABS:                        13.4   11.90 )-----------( 428      ( 01 Apr 2024 07:30 )             40.9     04-01    134<L>  |  96<L>  |  13  ----------------------------<  411<H>  5.1<H>   |  21  |  0.8    Ca    9.3      01 Apr 2024 07:30    TPro  7.1  /  Alb  4.0  /  TBili  0.3  /  DBili  x   /  AST  27  /  ALT  74<H>  /  AlkPhos  172<H>  04-01      Urinalysis Basic - ( 01 Apr 2024 07:30 )    Color: x / Appearance: x / SG: x / pH: x  Gluc: 411 mg/dL / Ketone: x  / Bili: x / Urobili: x   Blood: x / Protein: x / Nitrite: x   Leuk Esterase: x / RBC: x / WBC x   Sq Epi: x / Non Sq Epi: x / Bacteria: x      ABG - ( 01 Apr 2024 07:18 )  pH, Arterial: 7.39  pH, Blood: x     /  pCO2: 33    /  pO2: 115   / HCO3: 20    / Base Excess: -4.1  /  SaO2: 98.7              Lactate, Blood: 1.9 mmol/L (04-01-24 @ 07:30)          RADIOLOGY:

## 2024-04-01 NOTE — PHYSICAL THERAPY INITIAL EVALUATION ADULT - PERTINENT HX OF CURRENT PROBLEM, REHAB EVAL
38 year old  female, domiciled at [facility],  past medical history of T1D, diabetic neuropathy, HTN, chronic back pain, migraine headaches, pseudoseizures, with past psychiatric history of depression/anxiety and cluster B personality disorder, 8 previous psychiatric admissions (most recently discharged from [facility] on 6/13/23), multiple previous suicide attempts (remote hx of once by taking a capful of acetone, another by drinking hydrogen peroxide, another attempt by taking half a bottle of ibuprofen; most recent SA about a year ago by ingesting Pine-Sol, no known medical consequences), no legal or violence hx, denies substance use, BIBEMS activated by residence facility after patient expressed suicidal ideation.

## 2024-04-01 NOTE — UM REPORT PROGRESS NOTE - NSUMRMPROVIDER_GEN_A_CORE FT
[employer] [facility]  ID# [id number]  [phone]     4/1- Auth requested via [employer] portal. Pending auth # [id number]. Approved for 5 days from 3/31-4/4. Awaiting for CM assignment. [employer] [facility]  ID# [id number]  [phone]     4/1- Auth requested via [employer] portal. Pending auth # [id number]. Approved for 5 days from 3/31-4/4. CM assigned to Leeanne. [employer] [facility]  ID# [id number]  [phone]     4/1- Auth requested via [employer] portal. Pending auth # [id number]. Approved for 5 days from 3/31-4/4. CM assigned to [facility].  4/4/24 Norma - emailed clinicals to [facility] - waiting for determination. [employer] [facility]  ID# [id number]  [phone]     4/1- Auth requested via [employer] portal. Pending auth # [id number]. Approved for 5 days from 3/31-4/4. CM assigned to [facility].  4/4/24 Norma - emailed clinicals to [facility] - waiting for determination.  4/5/24 Norma - patient approved for additional days. LCD and review on 4/9 [employer] [facility]  ID# [id number]  [phone]     4/1- Auth requested via [employer] portal. Pending auth # [id number]. Approved for 5 days from 3/31-4/4. CM assigned to [facility].  4/4/24 Norma - emailed clinicals to [facility] - waiting for determination.  4/5/24 Norma - patient approved for additional days. LCD and review on 4/9 4/9/24 Norma - emailed clinicals to [facility] - waiting for determination. [employer] [facility]  ID# [id number]  [phone]     4/1- Auth requested via [employer] portal. Pending auth # [id number]. Approved for 5 days from 3/31-4/4. CM assigned to [facility].  4/4/24 Norma - emailed clinicals to [facility] - waiting for determination.  4/5/24 Norma - patient approved for additional days. LCD and review on 4/9 4/9/24 Norma - emailed clinicals to [facility] - waiting for determination.  4/9/24 Norma - patient approved for 3 additional days. [facility]

## 2024-04-01 NOTE — CHART NOTE - NSCHARTNOTEFT_GEN_A_CORE
Patient is now wide awake, sitting up in bed and asking about eating. She admits that this is a pseudoseizure. Will cancel CAT of head

## 2024-04-01 NOTE — BH SAFETY PLAN - DISTRACTION PLACE 2
[place] place that is safe and will provide a distraction for me would be the beach. Sarecycline Counseling: Patient advised regarding possible photosensitivity and discoloration of the teeth, skin, lips, tongue and gums.  Patient instructed to avoid sunlight, if possible.  When exposed to sunlight, patients should wear protective clothing, sunglasses, and sunscreen.  The patient was instructed to call the office immediately if the following severe adverse effects occur:  hearing changes, easy bruising/bleeding, severe headache, or vision changes.  The patient verbalized understanding of the proper use and possible adverse effects of sarecycline.  All of the patient's questions and concerns were addressed.

## 2024-04-01 NOTE — BH CHART NOTE - NSEVENTNOTEFT_PSY_ALL_CORE
Spoke with patient's mother, Argelia, who provided more information regarding Ania's baseline and behavior surrounding her recent admission. Per mother, the patient has been feeling very sleepy for sometime now, for which the patient believes is due to her Lamictal prescription, however her sleepiness has worsened for the last 2 months. She states that the patient has been sleeping all day and is difficult to arouse. Per the mother, the patient does not like Lamictal as it makes her feel tired and sleepy. Mother shared that the patient has been wanting to change her medication for sometime now. Additionally, she states that the patient has not been consistent with taking her insulin bolus for the past 2 months. She reports they share a room and she will see the patient get up at night to eat, but will not take her bolus. The mother shared that the patient "gets aggravated and hears what she wants to." Per mother, the patient had an argument with another resident at the facility "who called her trash" and told her to "slit [her] wrists." It was also shared that the mother and patient have been having arguments about the patient's partner (who also lives in the facility).      Spoke with patient's mother, Argelia, who provided more information regarding patient's baseline and behavior surrounding her recent admission. Per mother, the patient has been feeling very sleepy for sometime now, for which the patient believes is due to her Lamictal prescription, however her sleepiness has worsened for the last 2 months. She states that the patient has been sleeping all day and is difficult to arouse. Per the mother, the patient does not like Lamictal as it makes her feel tired and sleepy. Mother shared that the patient has been wanting to change her medication for sometime now. Additionally, she states that the patient has not been consistent with taking her insulin bolus for the past 2 months. She reports they share a room and she will see the patient get up at night to eat, but will not take her bolus. The mother shared that the patient "gets aggravated and hears what she wants to." Per mother, the patient had an argument with another resident at the facility "who called her trash" and told her to "slit [her] wrists." It was also shared that the mother and patient have been having arguments about the patient's partner (who also lives in the facility).

## 2024-04-01 NOTE — RAPID RESPONSE TEAM SUMMARY - NSSITUATIONBACKGROUNDRRT_GEN_ALL_CORE
A Code Blue was called as patient was not responding to staff. On my arrival patient  A Code Blue was called as patient was not responding to staff. On my arrival patient was seen to have shaking right hand (she is known to have this movement), some eye lid fluttering but resisting (on my attempt) being turned from lying on her side to lying on her back. She is not talking

## 2024-04-01 NOTE — BH SOCIAL WORK INITIAL PSYCHOSOCIAL EVALUATION - OTHER PAST PSYCHIATRIC HISTORY (INCLUDE DETAILS REGARDING ONSET, COURSE OF ILLNESS, INPATIENT/OUTPATIENT TREATMENT)
Ania has a past psychiatric history of depression/ anxiety and cluster B personality disorder, 8 previous psychiatric admissions (most recently discharged from [facility] June 2023), multiple previous suicide attempts (most recent a year ago by ingesting pine sol) and a history of pseudoseizures.

## 2024-04-01 NOTE — PHYSICAL THERAPY INITIAL EVALUATION ADULT - ADDITIONAL COMMENTS
Parent(s)
pt lives with her mother in an assisted living facility at [facility], no stairs.  Pt amb with [employer] at  baseline and as per pt  she was independent W/ all functional activity PTA

## 2024-04-01 NOTE — BH INPATIENT PSYCHIATRY ASSESSMENT NOTE - NSBHCHARTREVIEWVS_PSY_A_CORE FT
Vital Signs Last 24 Hrs  T(C): 36.4 (03-31-24 @ 18:14), Max: 36.7 (03-31-24 @ 14:48)  T(F): 97.5 (03-31-24 @ 18:14), Max: 98 (03-31-24 @ 14:48)  HR: 111 (04-01-24 @ 06:55) (71 - 111)  BP: 141/70 (04-01-24 @ 06:55) (132/90 - 141/70)  BP(mean): --  RR: 16 (03-31-24 @ 14:48) (16 - 16)  SpO2: 97% (03-31-24 @ 14:48) (97% - 97%)     Vital Signs Last 24 Hrs  T(C): 35.7 (04-02-24 @ 08:29), Max: 35.8 (04-01-24 @ 11:08)  T(F): 96.3 (04-02-24 @ 08:29), Max: 96.5 (04-01-24 @ 11:08)  HR: 127 (04-02-24 @ 08:29) (105 - 127)  BP: 151/85 (04-02-24 @ 08:29) (124/79 - 151/85)  BP(mean): --  RR: 16 (04-02-24 @ 08:29) (16 - 18)  SpO2: --     Vital Signs Last 24 Hrs  T(C): 37.1 (04-02-24 @ 15:39), Max: 37.1 (04-02-24 @ 15:39)  T(F): 98.7 (04-02-24 @ 15:39), Max: 98.7 (04-02-24 @ 15:39)  HR: 105 (04-02-24 @ 15:39) (105 - 105)  BP: 105/68 (04-02-24 @ 15:39) (105/68 - 105/68)  BP(mean): --  RR: 17 (04-02-24 @ 15:39) (17 - 17)  SpO2: --

## 2024-04-01 NOTE — BH INPATIENT PSYCHIATRY ASSESSMENT NOTE - NSBHASSESSSUMMFT_PSY_ALL_CORE
Ania Mei is a 38 year old  female, domiciled at [facility],  past medical history of T1D, diabetic neuropathy, HTN, chronic back pain, migraine headaches, pseudoseizures, with past psychiatric history of depression/anxiety and cluster B personality disorder, 8 previous psychiatric admissions (most recently discharged from [facility] on 6/13/23), multiple previous suicide attempts (remote hx of once by taking a capful of acetone, another by drinking hydrogen peroxide, another attempt by taking half a bottle of ibuprofen; most recent SA about a year ago by ingesting Pine-Sol, no known medical consequences), no legal or violence hx, denies substance use, BIBEMS activated by residence facility after patient expressed suicidal ideation.   On assessment in the ED, patient presented with worsening depressive symptoms x2 months, passive suicidal ideation, unable to engage in safety planning, with reported adherence to medication management but with recent disengagement from outpatient care. She denied any AVH, did not appear acutely manic or psychotic.  On assessment on the inpatient unit, patient had linear thought process, normal speech and depressed but full affect. Patient presents with depressed mood, anhedonia, low energy, changed appetite, and suicidal ideation for 2 months, meeting criteria for major depressive episode. Patient denied any history of alvina. Given patient's numerous suicide attempts in the past, and current suicidality, in the context of cluster B personality disorder, patient would benefit from continued voluntary inpatient psychiatric care for safety planning and medication management.     Plan:  - Continue home medical medication   - Lexapro 20mg daily, Lamictal 50mg QHS, Amitriptyline 75mg PO daily   - PRNs for agitation: Ativan 2mg + Benadryl 50mg PO/IM q6h  - Endocrine consulted for diabetes medication management since patient no longer has insulin pump

## 2024-04-01 NOTE — BH INPATIENT PSYCHIATRY ASSESSMENT NOTE - RISK ASSESSMENT
Modifiable risk factors include SI/I/P, psychiatric illness, recent psychosocial stressors, medical illness, substance use/intoxication, unemployment.  Static risk factors include  race, prior SA.  Protective factors include social support/family connectiveness, seeking out treatment.

## 2024-04-01 NOTE — BH INPATIENT PSYCHIATRY ASSESSMENT NOTE - ADULT OR CHILD PROTECTIVE SERVICES INVOLVEMENT
Problem: Patient Care Overview  Goal: Plan of Care Review  Outcome: Ongoing (interventions implemented as appropriate)  Afebrile. No distress noted. Pt has been asleep since admit to floor. No emesis. IVF infusing; due to void. POC discussed with mother; verbalized understanding. Will continue to monitor.        No

## 2024-04-01 NOTE — BH INPATIENT PSYCHIATRY ASSESSMENT NOTE - NSBHATTESTCOMMENTATTENDFT_PSY_A_CORE
Pt seen, chart reviewed and case discussed with treatment team. Agree with assessment and plan Pt seen, chart reviewed and case discussed with treatment team. Agree with assessment and plan    Pt admits to having passive suicidal ideations since admission and depressed mood. She has had several suicidal gestures (no hx of serious suicidal attempts requiring hospitalization or medical care). Due to poor impulse control she requires further inpatient stabilization for safety.

## 2024-04-01 NOTE — BH INPATIENT PSYCHIATRY ASSESSMENT NOTE - HPI (INCLUDE ILLNESS QUALITY, SEVERITY, DURATION, TIMING, CONTEXT, MODIFYING FACTORS, ASSOCIATED SIGNS AND SYMPTOMS)
Ania Mei is a 38 year old  female, domiciled at [facility] with mother, no dependents, unemployed, past medical history of T1D, diabetic neuropathy, HTN, chronic back pain, migraine headaches, pseudoseizures, with past psychiatric history of depression/anxiety and cluster B personality disorder, 8 previous psychiatric admissions (most recently discharged from [facility] on 23), multiple previous suicide attempts (remote hx of once by taking a capful of acetone, another by drinking hydrogen peroxide, another attempt by taking half a bottle of ibuprofen; most recent SA about a year ago by ingesting Pine-Sol, no known medical consequences), no legal or violence hx, denies substance use, BIBEMS activated by residence facility after patient expressed suicidal ideation.  Upon approach, patient is lying in bed comfortably. Patient is alert and oriented and engages with interviewer. Patient is cooperative and answers all questions appropriately. She reports that she started feeling depressed 2 months ago when her friend told her she should slit her wrist and that she hopes she chokes. Patient reports feeling hurt that she would say that when she already knows she is suicidal. Patient reports she tried managing the depression but was constantly sick with COVID, etc, and was unable to see her therapist. She reports that she told her mother that she wants to be with her  father which led to her calling EMS. She reports no desire to engage in activities she once found to be entertaining such as shopping. She reports low energy and appetite that "comes and goes" though she usually overeats when not feeling depressed. She confirms current suicidal ideation. Patient denies any persecutory delusions and denies any auditory or visual hallucinations. She denies any history of alvina. She reports that her goal here is to adjust medications to help with mood.

## 2024-04-01 NOTE — BH INPATIENT PSYCHIATRY ASSESSMENT NOTE - OTHER PAST PSYCHIATRIC HISTORY (INCLUDE DETAILS REGARDING ONSET, COURSE OF ILLNESS, INPATIENT/OUTPATIENT TREATMENT)
Multiple previous psychiatric admissions (most recent to [facility] in June 2023)  In outpatient treatment with Dr. Philippe (psychiatrist), Kylee (therapist)  Currently on Lexapro, Amitriptyline, and Lamictal  Multiple past suicidal gestures  First depressive episode after 9/11

## 2024-04-01 NOTE — BH INPATIENT PSYCHIATRY ASSESSMENT NOTE - DETAILS
Patient with passive suicidal ideation. Multiple previous attempts as above. States partner is verbally abusive

## 2024-04-01 NOTE — BH INPATIENT PSYCHIATRY ASSESSMENT NOTE - NSBHCONSBHPROVDETAILS_PSY_A_CORE  FT
Dr. Curiel contacted vis MS teams on 4/2/24 reports patient has been on Lexapro 20mg for a long time and previously on Celexa which neither appear to be helping mood he reports. He reports Lamictal was increased to 100mg in the past and patient complained of severe sedation so the decreased the dose back down to 50mg. He reports that patient reported that her mood was the same on 50mg as it was on 100mg of the Lamictal. He reports Amitriptyline is being prescribed by her neurologist for migraines and she reported that it is effective in treating her migraines. He reports that he checked Amitriptyline serum levels and it was 160 (WNL). He reports that she has frequent panic attacks and the pseudoseizures have been happening for a very long time. He reports that she does not manage her stress well and binge eats at night. He reports that her blood glucose levels range from 300's to 400's on average.

## 2024-04-01 NOTE — BH INPATIENT PSYCHIATRY ASSESSMENT NOTE - DESCRIPTION
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 She has boyfriend who is  and lives in same facility who she reports threatens to kill himself if she tries to leave him.

## 2024-04-01 NOTE — BH SOCIAL WORK INITIAL PSYCHOSOCIAL EVALUATION - NSBHTREATHXNAMEFT_PSY_ALL_CORE
[facility] OPD (Dr. Curiel Suzan) 
<<-----Click here for Discharge Medication Review

## 2024-04-02 LAB
GLUCOSE BLDC GLUCOMTR-MCNC: 305 MG/DL — HIGH (ref 70–99)
GLUCOSE BLDC GLUCOMTR-MCNC: 308 MG/DL — HIGH (ref 70–99)
GLUCOSE BLDC GLUCOMTR-MCNC: 338 MG/DL — HIGH (ref 70–99)
GLUCOSE BLDC GLUCOMTR-MCNC: 346 MG/DL — HIGH (ref 70–99)

## 2024-04-02 RX ORDER — ARIPIPRAZOLE 15 MG/1
2 TABLET ORAL DAILY
Refills: 0 | Status: DISCONTINUED | OUTPATIENT
Start: 2024-04-02 | End: 2024-04-04

## 2024-04-02 RX ORDER — INSULIN LISPRO 100/ML
10 VIAL (ML) SUBCUTANEOUS
Refills: 0 | Status: DISCONTINUED | OUTPATIENT
Start: 2024-04-02 | End: 2024-04-10

## 2024-04-02 RX ORDER — INSULIN GLARGINE 100 [IU]/ML
20 INJECTION, SOLUTION SUBCUTANEOUS
Refills: 0 | Status: DISCONTINUED | OUTPATIENT
Start: 2024-04-02 | End: 2024-04-10

## 2024-04-02 RX ADMIN — Medication 4: at 11:41

## 2024-04-02 RX ADMIN — SENNA PLUS 2 TABLET(S): 8.6 TABLET ORAL at 20:43

## 2024-04-02 RX ADMIN — PANTOPRAZOLE SODIUM 40 MILLIGRAM(S): 20 TABLET, DELAYED RELEASE ORAL at 06:32

## 2024-04-02 RX ADMIN — Medication 650 MILLIGRAM(S): at 13:19

## 2024-04-02 RX ADMIN — Medication 4: at 07:36

## 2024-04-02 RX ADMIN — Medication 25 MILLIGRAM(S): at 08:44

## 2024-04-02 RX ADMIN — ESCITALOPRAM OXALATE 20 MILLIGRAM(S): 10 TABLET, FILM COATED ORAL at 08:44

## 2024-04-02 RX ADMIN — Medication 325 MILLIGRAM(S): at 08:46

## 2024-04-02 RX ADMIN — INSULIN GLARGINE 20 UNIT(S): 100 INJECTION, SOLUTION SUBCUTANEOUS at 20:44

## 2024-04-02 RX ADMIN — ARIPIPRAZOLE 2 MILLIGRAM(S): 15 TABLET ORAL at 13:31

## 2024-04-02 RX ADMIN — Medication 75 MILLIGRAM(S): at 08:44

## 2024-04-02 RX ADMIN — Medication 650 MILLIGRAM(S): at 13:33

## 2024-04-02 RX ADMIN — Medication 10 UNIT(S): at 11:41

## 2024-04-02 RX ADMIN — Medication 25 MILLIGRAM(S): at 20:43

## 2024-04-02 RX ADMIN — Medication 25 MILLIGRAM(S): at 13:20

## 2024-04-02 NOTE — BH INPATIENT PSYCHIATRY PROGRESS NOTE - NSBHMETABOLIC_PSY_ALL_CORE_FT
BMI: BMI (kg/m2): 32.9 (03-31-24 @ 18:14)  HbA1c: A1C with Estimated Average Glucose Result: 12.1 % (04-01-24 @ 07:30)    Glucose: POCT Blood Glucose.: 305 mg/dL (04-02-24 @ 06:42)    BP: 151/85 (04-02-24 @ 08:29) (124/79 - 151/85)Vital Signs Last 24 Hrs  T(C): 35.7 (04-02-24 @ 08:29), Max: 35.8 (04-01-24 @ 11:08)  T(F): 96.3 (04-02-24 @ 08:29), Max: 96.5 (04-01-24 @ 11:08)  HR: 127 (04-02-24 @ 08:29) (105 - 127)  BP: 151/85 (04-02-24 @ 08:29) (124/79 - 151/85)  BP(mean): --  RR: 16 (04-02-24 @ 08:29) (16 - 18)  SpO2: --      Lipid Panel:  BMI: BMI (kg/m2): 32.9 (03-31-24 @ 18:14)  HbA1c: A1C with Estimated Average Glucose Result: 12.1 % (04-01-24 @ 07:30)    Glucose: POCT Blood Glucose.: 346 mg/dL (04-02-24 @ 11:34)    BP: 151/85 (04-02-24 @ 08:29) (124/79 - 151/85)Vital Signs Last 24 Hrs  T(C): 35.7 (04-02-24 @ 08:29), Max: 35.7 (04-01-24 @ 16:24)  T(F): 96.3 (04-02-24 @ 08:29), Max: 96.3 (04-02-24 @ 08:29)  HR: 127 (04-02-24 @ 08:29) (105 - 127)  BP: 151/85 (04-02-24 @ 08:29) (124/79 - 151/85)  BP(mean): --  RR: 16 (04-02-24 @ 08:29) (16 - 18)  SpO2: --      Lipid Panel:

## 2024-04-02 NOTE — BH INPATIENT PSYCHIATRY PROGRESS NOTE - NSBHASSESSSUMMFT_PSY_ALL_CORE
Ania Mei is a 38 year old  female, domiciled at [facility],  past medical history of T1D, diabetic neuropathy, HTN, chronic back pain, migraine headaches, pseudoseizures, with past psychiatric history of depression/anxiety and cluster B personality disorder, 8 previous psychiatric admissions (most recently discharged from [facility] on 6/13/23), multiple previous suicide attempts (remote hx of once by taking a capful of acetone, another by drinking hydrogen peroxide, another attempt by taking half a bottle of ibuprofen; most recent SA about a year ago by ingesting Pine-Sol, no known medical consequences), no legal or violence hx, denies substance use, BIBEMS activated by residence facility after patient expressed suicidal ideation. On assessment in the ED, patient presented with worsening depressive symptoms x2 months, passive suicidal ideation, unable to engage in safety planning, with reported adherence to medication management but with recent disengagement from outpatient care. She denied any AVH, did not appear acutely manic or psychotic.    On assessment on the inpatient unit, patient had linear thought process, normal speech and depressed but full affect. Patient presents with depressed mood, anhedonia, low energy, changed appetite, and suicidal ideation for 2 months, meeting criteria for major depressive episode. Patient denied any history of alvina. Given patient's numerous suicide attempts in the past, and current suicidality, in the context of cluster B personality disorder, patient would benefit from continued voluntary inpatient psychiatric care for safety planning and medication management.     4/02: Patient continues to display linear thought process, depressed affect, and low energy. Although there has been an improvement in suicidal ideation, shifting from persistent to intermittent ideation occurring every few hours, continued inpatient care would be beneficial. Patient agreeable to starting Abilify. Continue medical management of Type 1 DM per endocrine recommendations    Plan:  - Discontinue Lamictal 50mg PO QHS - 4/02  - Start Abilify 2mg PO QD - 4/02  - Lexapro 20mg PO QD   - Amitriptyline 75mg PO daily   - PRNs for agitation: Ativan 2mg + Benadryl 50mg PO/IM q6h  - Continue home medical medication    - Increase Lantus from 20 units QD to 20 units BID - 4/02  - Start Lispro 10 units TID before meals - 4/02  - Continue current insulin sliding scale   Ania Mei is a 38 year old  female, domiciled at [facility],  past medical history of T1D, diabetic neuropathy, HTN, chronic back pain, migraine headaches, pseudoseizures, with past psychiatric history of depression/anxiety and cluster B personality disorder, 8 previous psychiatric admissions (most recently discharged from [facility] on 6/13/23), multiple previous suicide attempts (remote hx of once by taking a capful of acetone, another by drinking hydrogen peroxide, another attempt by taking half a bottle of ibuprofen; most recent SA about a year ago by ingesting Pine-Sol, no known medical consequences), no legal or violence hx, denies substance use, BIBEMS activated by residence facility after patient expressed suicidal ideation. On assessment in the ED, patient presented with worsening depressive symptoms x2 months, passive suicidal ideation, unable to engage in safety planning, with reported adherence to medication management but with recent disengagement from outpatient care. She denied any AVH, did not appear acutely manic or psychotic.    On assessment on the inpatient unit, patient had linear thought process, normal speech and depressed but full affect. Patient presents with depressed mood, anhedonia, low energy, changed appetite, and suicidal ideation for 2 months, meeting criteria for major depressive episode. Patient denied any history of alvina. Given patient's numerous suicide attempts in the past, and current suicidality, in the context of cluster B personality disorder, patient would benefit from continued voluntary inpatient psychiatric care for safety planning and medication management.     4/02: Patient continues to display linear thought process, depressed affect, and low energy. Although there has been an improvement in suicidal ideation, shifting from persistent to intermittent ideation occurring every few hours, continued inpatient care would be beneficial. Patient agreeable to starting Abilify. Continue medical management of Type 1 DM per endocrine recommendations    Plan:  - Start Abilify 2mg PO QD - 4/02  - C/W Lexapro 20mg PO QD   - C/W Amitriptyline 75mg PO daily   - Discontinued Lamictal 50mg PO QHS - 4/02  - PRNs for agitation: Ativan 2mg + Benadryl 50mg PO/IM q6h    - C/W home medical medications    #Type 1 DM  Per endocrine recs:  - Increase Lantus from 20 units QD to 20 units BID - 4/02  - Start Lispro 10 units TID before meals - 4/02  - Continue current insulin sliding scale

## 2024-04-02 NOTE — BH INPATIENT PSYCHIATRY PROGRESS NOTE - NSBHFUPINTERVALHXFT_PSY_A_CORE
Patient was seen and evaluated this morning. Chart was reviewed and per staff, her "fingerstick values are consistently high." No PRN medications were administered overnight. Upon approach, patient is lying in bed comfortably. Patient is alert and oriented and engages with interviewer. Patient is cooperative and answers all questions appropriately. When asked how she's doing, she responds "still tired." She states that she "kept going back to sleep yesterday" reporting that she ate, showered, and then went back to sleep. During the interview, her history of experiencing pseudoseizures was discussed, in which she reported having them for about 4-5 years now. She reports that when she has these episodes her "eyes flicker and arms and legs shake." She states her last pseudoseizure, prior to the one on 4/01 AM, was last Tuesday when she saw her new psychiatrist. She believes the stress of seeing a new psychiatrist triggered her to have a pseudoseizure. When asked if she currently has any suicidal thoughts, she responds "a little, but not as bad." She states that before, her suicidal thoughts were constant but now they are every few hours. During the interview, her medication history (including past medications and side effects) was discussed. Patient agreeable to starting a low dose of Abilify. When asked if she experiences mood swings, she replies "yes, it goes up and down." She reports that when her mood and energy are low, she gets angry. Upon further questioning, she states that when she gets angry she fights and yells. Patient was seen and evaluated this morning. Chart was reviewed and fingerstick values are consistently elevated. No PRN medications were administered overnight. Upon approach, patient is lying in bed comfortably. Patient is alert and oriented and engages with interviewer. Patient is cooperative and answers all questions appropriately. When asked how she's doing, she responds "still tired." She states that she "kept going back to sleep yesterday" reporting that she ate, showered, and then went back to sleep. During the interview, her history of experiencing pseudoseizures was discussed, in which she reported having them for about 4-5 years now. She reports that when she has these episodes her "eyes flicker and arms and legs shake." She states her last pseudoseizure, prior to the one on 4/01 AM, was last Tuesday when she saw her new psychiatrist. She believes the stress of seeing a new psychiatrist triggered her to have a pseudoseizure. When asked if she currently has any suicidal thoughts, she responds "a little, but not as bad." She states that before, her suicidal thoughts were constant but now they are every few hours. During the interview, her medication history (including past medications and side effects) was discussed. Patient agreeable to starting a low dose of Abilify. When asked if she experiences mood swings, she replies "yes, it goes up and down." She reports that when her mood and energy are low, and she gets angry. Upon further questioning, she states that when she gets angry she fights and yells.

## 2024-04-02 NOTE — BH INPATIENT PSYCHIATRY PROGRESS NOTE - PRN MEDS
MEDICATIONS  (PRN):  acetaminophen     Tablet .. 650 milliGRAM(s) Oral every 6 hours PRN Temp greater or equal to 38C (100.4F), Mild Pain (1 - 3)  albuterol    90 MICROgram(s) HFA Inhaler 2 Puff(s) Inhalation every 6 hours PRN Shortness of Breath and/or Wheezing  aluminum hydroxide/magnesium hydroxide/simethicone Suspension 30 milliLiter(s) Oral every 8 hours PRN Dyspepsia  benzonatate 100 milliGRAM(s) Oral three times a day PRN Cough  dextrose Oral Gel 15 Gram(s) Oral once PRN Blood Glucose LESS THAN 70 milliGRAM(s)/deciliter  diphenhydrAMINE 50 milliGRAM(s) Oral every 6 hours PRN Combative behavior  lidocaine 2% Gel 1 Application(s) Topical every 6 hours PRN anal fissure pain  LORazepam     Tablet 2 milliGRAM(s) Oral every 6 hours PRN agitation  ondansetron    Tablet 4 milliGRAM(s) Oral every 8 hours PRN Nausea and/or Vomiting

## 2024-04-02 NOTE — BH INPATIENT PSYCHIATRY PROGRESS NOTE - CURRENT MEDICATION
MEDICATIONS  (STANDING):  amitriptyline 75 milliGRAM(s) Oral daily  dextrose 5%. 1000 milliLiter(s) (100 mL/Hr) IV Continuous <Continuous>  dextrose 5%. 1000 milliLiter(s) (50 mL/Hr) IV Continuous <Continuous>  dextrose 50% Injectable 25 Gram(s) IV Push once  dextrose 50% Injectable 12.5 Gram(s) IV Push once  dextrose 50% Injectable 25 Gram(s) IV Push once  escitalopram 20 milliGRAM(s) Oral daily  ferrous    sulfate 325 milliGRAM(s) Oral daily  glucagon  Injectable 1 milliGRAM(s) IntraMuscular once  insulin glargine Injectable (LANTUS) 20 Unit(s) SubCutaneous two times a day  insulin lispro (ADMELOG) corrective regimen sliding scale   SubCutaneous three times a day before meals  insulin lispro Injectable (ADMELOG) 10 Unit(s) SubCutaneous three times a day before meals  lamoTRIgine 50 milliGRAM(s) Oral at bedtime  metoprolol tartrate 25 milliGRAM(s) Oral three times a day  pantoprazole    Tablet 40 milliGRAM(s) Oral before breakfast  polyethylene glycol 3350 17 Gram(s) Oral at bedtime  senna 2 Tablet(s) Oral at bedtime    MEDICATIONS  (PRN):  acetaminophen     Tablet .. 650 milliGRAM(s) Oral every 6 hours PRN Temp greater or equal to 38C (100.4F), Mild Pain (1 - 3)  albuterol    90 MICROgram(s) HFA Inhaler 2 Puff(s) Inhalation every 6 hours PRN Shortness of Breath and/or Wheezing  aluminum hydroxide/magnesium hydroxide/simethicone Suspension 30 milliLiter(s) Oral every 8 hours PRN Dyspepsia  benzonatate 100 milliGRAM(s) Oral three times a day PRN Cough  dextrose Oral Gel 15 Gram(s) Oral once PRN Blood Glucose LESS THAN 70 milliGRAM(s)/deciliter  diphenhydrAMINE 50 milliGRAM(s) Oral every 6 hours PRN Combative behavior  lidocaine 2% Gel 1 Application(s) Topical every 6 hours PRN anal fissure pain  LORazepam     Tablet 2 milliGRAM(s) Oral every 6 hours PRN agitation  ondansetron    Tablet 4 milliGRAM(s) Oral every 8 hours PRN Nausea and/or Vomiting   MEDICATIONS  (STANDING):  amitriptyline 75 milliGRAM(s) Oral daily  ARIPiprazole 2 milliGRAM(s) Oral daily  dextrose 5%. 1000 milliLiter(s) (50 mL/Hr) IV Continuous <Continuous>  dextrose 5%. 1000 milliLiter(s) (100 mL/Hr) IV Continuous <Continuous>  dextrose 50% Injectable 25 Gram(s) IV Push once  dextrose 50% Injectable 25 Gram(s) IV Push once  dextrose 50% Injectable 12.5 Gram(s) IV Push once  escitalopram 20 milliGRAM(s) Oral daily  ferrous    sulfate 325 milliGRAM(s) Oral daily  glucagon  Injectable 1 milliGRAM(s) IntraMuscular once  insulin glargine Injectable (LANTUS) 20 Unit(s) SubCutaneous two times a day  insulin lispro (ADMELOG) corrective regimen sliding scale   SubCutaneous three times a day before meals  insulin lispro Injectable (ADMELOG) 10 Unit(s) SubCutaneous three times a day before meals  metoprolol tartrate 25 milliGRAM(s) Oral three times a day  pantoprazole    Tablet 40 milliGRAM(s) Oral before breakfast  polyethylene glycol 3350 17 Gram(s) Oral at bedtime  senna 2 Tablet(s) Oral at bedtime    MEDICATIONS  (PRN):  acetaminophen     Tablet .. 650 milliGRAM(s) Oral every 6 hours PRN Temp greater or equal to 38C (100.4F), Mild Pain (1 - 3)  albuterol    90 MICROgram(s) HFA Inhaler 2 Puff(s) Inhalation every 6 hours PRN Shortness of Breath and/or Wheezing  aluminum hydroxide/magnesium hydroxide/simethicone Suspension 30 milliLiter(s) Oral every 8 hours PRN Dyspepsia  benzonatate 100 milliGRAM(s) Oral three times a day PRN Cough  dextrose Oral Gel 15 Gram(s) Oral once PRN Blood Glucose LESS THAN 70 milliGRAM(s)/deciliter  diphenhydrAMINE 50 milliGRAM(s) Oral every 6 hours PRN Combative behavior  lidocaine 2% Gel 1 Application(s) Topical every 6 hours PRN anal fissure pain  LORazepam     Tablet 2 milliGRAM(s) Oral every 6 hours PRN agitation  ondansetron    Tablet 4 milliGRAM(s) Oral every 8 hours PRN Nausea and/or Vomiting

## 2024-04-02 NOTE — BH INPATIENT PSYCHIATRY PROGRESS NOTE - NSBHCHARTREVIEWVS_PSY_A_CORE FT
Vital Signs Last 24 Hrs  T(C): 35.7 (04-02-24 @ 08:29), Max: 35.8 (04-01-24 @ 11:08)  T(F): 96.3 (04-02-24 @ 08:29), Max: 96.5 (04-01-24 @ 11:08)  HR: 127 (04-02-24 @ 08:29) (105 - 127)  BP: 151/85 (04-02-24 @ 08:29) (124/79 - 151/85)  BP(mean): --  RR: 16 (04-02-24 @ 08:29) (16 - 18)  SpO2: --     Vital Signs Last 24 Hrs  T(C): 35.7 (04-02-24 @ 08:29), Max: 35.7 (04-01-24 @ 16:24)  T(F): 96.3 (04-02-24 @ 08:29), Max: 96.3 (04-02-24 @ 08:29)  HR: 127 (04-02-24 @ 08:29) (105 - 127)  BP: 151/85 (04-02-24 @ 08:29) (124/79 - 151/85)  BP(mean): --  RR: 16 (04-02-24 @ 08:29) (16 - 18)  SpO2: --

## 2024-04-03 ENCOUNTER — APPOINTMENT (OUTPATIENT)
Dept: GASTROENTEROLOGY | Facility: CLINIC | Age: 39
End: 2024-04-03

## 2024-04-03 LAB
ALBUMIN SERPL ELPH-MCNC: 4.1 G/DL — SIGNIFICANT CHANGE UP (ref 3.5–5.2)
ALP SERPL-CCNC: 173 U/L — HIGH (ref 30–115)
ALT FLD-CCNC: 59 U/L — HIGH (ref 0–41)
ANION GAP SERPL CALC-SCNC: 17 MMOL/L — HIGH (ref 7–14)
AST SERPL-CCNC: 24 U/L — SIGNIFICANT CHANGE UP (ref 0–41)
BASOPHILS # BLD AUTO: 0.07 K/UL — SIGNIFICANT CHANGE UP (ref 0–0.2)
BASOPHILS NFR BLD AUTO: 0.8 % — SIGNIFICANT CHANGE UP (ref 0–1)
BILIRUB SERPL-MCNC: 0.3 MG/DL — SIGNIFICANT CHANGE UP (ref 0.2–1.2)
BUN SERPL-MCNC: 12 MG/DL — SIGNIFICANT CHANGE UP (ref 10–20)
CALCIUM SERPL-MCNC: 9 MG/DL — SIGNIFICANT CHANGE UP (ref 8.4–10.5)
CHLORIDE SERPL-SCNC: 96 MMOL/L — LOW (ref 98–110)
CO2 SERPL-SCNC: 22 MMOL/L — SIGNIFICANT CHANGE UP (ref 17–32)
CREAT SERPL-MCNC: 0.6 MG/DL — LOW (ref 0.7–1.5)
EGFR: 118 ML/MIN/1.73M2 — SIGNIFICANT CHANGE UP
EOSINOPHIL # BLD AUTO: 0.08 K/UL — SIGNIFICANT CHANGE UP (ref 0–0.7)
EOSINOPHIL NFR BLD AUTO: 0.9 % — SIGNIFICANT CHANGE UP (ref 0–8)
GLUCOSE BLDC GLUCOMTR-MCNC: 265 MG/DL — HIGH (ref 70–99)
GLUCOSE BLDC GLUCOMTR-MCNC: 272 MG/DL — HIGH (ref 70–99)
GLUCOSE BLDC GLUCOMTR-MCNC: 273 MG/DL — HIGH (ref 70–99)
GLUCOSE BLDC GLUCOMTR-MCNC: 308 MG/DL — HIGH (ref 70–99)
GLUCOSE SERPL-MCNC: 349 MG/DL — HIGH (ref 70–99)
HCT VFR BLD CALC: 41.4 % — SIGNIFICANT CHANGE UP (ref 37–47)
HGB BLD-MCNC: 13.4 G/DL — SIGNIFICANT CHANGE UP (ref 12–16)
IMM GRANULOCYTES NFR BLD AUTO: 0.6 % — HIGH (ref 0.1–0.3)
LYMPHOCYTES # BLD AUTO: 2.17 K/UL — SIGNIFICANT CHANGE UP (ref 1.2–3.4)
LYMPHOCYTES # BLD AUTO: 24.8 % — SIGNIFICANT CHANGE UP (ref 20.5–51.1)
MCHC RBC-ENTMCNC: 27 PG — SIGNIFICANT CHANGE UP (ref 27–31)
MCHC RBC-ENTMCNC: 32.4 G/DL — SIGNIFICANT CHANGE UP (ref 32–37)
MCV RBC AUTO: 83.5 FL — SIGNIFICANT CHANGE UP (ref 81–99)
MONOCYTES # BLD AUTO: 0.51 K/UL — SIGNIFICANT CHANGE UP (ref 0.1–0.6)
MONOCYTES NFR BLD AUTO: 5.8 % — SIGNIFICANT CHANGE UP (ref 1.7–9.3)
NEUTROPHILS # BLD AUTO: 5.86 K/UL — SIGNIFICANT CHANGE UP (ref 1.4–6.5)
NEUTROPHILS NFR BLD AUTO: 67.1 % — SIGNIFICANT CHANGE UP (ref 42.2–75.2)
NRBC # BLD: 0 /100 WBCS — SIGNIFICANT CHANGE UP (ref 0–0)
PLATELET # BLD AUTO: 425 K/UL — HIGH (ref 130–400)
PMV BLD: 10.2 FL — SIGNIFICANT CHANGE UP (ref 7.4–10.4)
POTASSIUM SERPL-MCNC: 4.4 MMOL/L — SIGNIFICANT CHANGE UP (ref 3.5–5)
POTASSIUM SERPL-SCNC: 4.4 MMOL/L — SIGNIFICANT CHANGE UP (ref 3.5–5)
PROT SERPL-MCNC: 7 G/DL — SIGNIFICANT CHANGE UP (ref 6–8)
RBC # BLD: 4.96 M/UL — SIGNIFICANT CHANGE UP (ref 4.2–5.4)
RBC # FLD: 14.7 % — HIGH (ref 11.5–14.5)
SODIUM SERPL-SCNC: 135 MMOL/L — SIGNIFICANT CHANGE UP (ref 135–146)
WBC # BLD: 8.74 K/UL — SIGNIFICANT CHANGE UP (ref 4.8–10.8)
WBC # FLD AUTO: 8.74 K/UL — SIGNIFICANT CHANGE UP (ref 4.8–10.8)

## 2024-04-03 PROCEDURE — 99232 SBSQ HOSP IP/OBS MODERATE 35: CPT | Mod: GC

## 2024-04-03 RX ADMIN — INSULIN GLARGINE 20 UNIT(S): 100 INJECTION, SOLUTION SUBCUTANEOUS at 07:48

## 2024-04-03 RX ADMIN — Medication 10 UNIT(S): at 11:56

## 2024-04-03 RX ADMIN — Medication 10 UNIT(S): at 16:42

## 2024-04-03 RX ADMIN — INSULIN GLARGINE 20 UNIT(S): 100 INJECTION, SOLUTION SUBCUTANEOUS at 20:09

## 2024-04-03 RX ADMIN — Medication 25 MILLIGRAM(S): at 20:09

## 2024-04-03 RX ADMIN — Medication 3: at 07:27

## 2024-04-03 RX ADMIN — Medication 10 UNIT(S): at 07:27

## 2024-04-03 RX ADMIN — Medication 3: at 11:55

## 2024-04-03 RX ADMIN — Medication 3: at 16:41

## 2024-04-03 RX ADMIN — SENNA PLUS 2 TABLET(S): 8.6 TABLET ORAL at 20:09

## 2024-04-03 RX ADMIN — ONDANSETRON 4 MILLIGRAM(S): 8 TABLET, FILM COATED ORAL at 20:09

## 2024-04-03 NOTE — BH INPATIENT PSYCHIATRY PROGRESS NOTE - NSBHASSESSSUMMFT_PSY_ALL_CORE
Ania Mei is a 38 year old  female, domiciled at [facility],  past medical history of T1D, diabetic neuropathy, HTN, chronic back pain, migraine headaches, pseudoseizures, with past psychiatric history of depression/anxiety and cluster B personality disorder, 8 previous psychiatric admissions (most recently discharged from [facility] on 6/13/23), multiple previous suicide attempts (remote hx of once by taking a capful of acetone, another by drinking hydrogen peroxide, another attempt by taking half a bottle of ibuprofen; most recent SA about a year ago by ingesting Pine-Sol, no known medical consequences), no legal or violence hx, denies substance use, BIBEMS activated by residence facility after patient expressed suicidal ideation. On assessment in the ED, patient presented with worsening depressive symptoms x2 months, passive suicidal ideation, unable to engage in safety planning, with reported adherence to medication management but with recent disengagement from outpatient care. She denied any AVH, did not appear acutely manic or psychotic.    On assessment on the inpatient unit, patient had linear thought process, normal speech and depressed but full affect. Patient presents with depressed mood, anhedonia, low energy, changed appetite, and suicidal ideation for 2 months, meeting criteria for major depressive episode. Patient denied any history of alvina. Given patient's numerous suicide attempts in the past, and current suicidality, in the context of cluster B personality disorder, patient would benefit from continued voluntary inpatient psychiatric care for safety planning and medication management.     During inpatient admission, patient continued to display linear thought process, depressed affect, and low energy. Although an improvement in suicidal ideation (shifting from persistent to intermittent ideation occurring every few hours) was noted, continued inpatient care remained beneficial. Given patient's PMHx of Type 1 DM and fingerstick values, endocrine was consulted and medical adjustments to regimen were made per their recommendations. Throughout her admission, she had 2 episodes of being uncooperative and nonverbal in which she maintained a passive and unresponsive demeanor throughout the interaction (despite attempts to elicit a response).     Plan:  - Start Abilify 2mg PO QD - 4/02  - C/W Lexapro 20mg PO QD   - C/W Amitriptyline 75mg PO daily   - Discontinued Lamictal 50mg PO QHS - 4/02  - PRNs for agitation: Ativan 2mg + Benadryl 50mg PO/IM q6h    - C/W home medical medications    #Type 1 DM  Per endocrine recs:  - Increase Lantus from 20 units QD to 20 units BID - 4/02  - Start Lispro 10 units TID before meals - 4/02  - Continue current insulin sliding scale

## 2024-04-03 NOTE — BH INPATIENT PSYCHIATRY ASSESSMENT NOTE - NSBHMSESPEECH_PSY_A_CORE
Normal volume, rate, productivity, spontaneity and articulation stair training/bed mobility training/gait training/strengthening/transfer training

## 2024-04-03 NOTE — BH INPATIENT PSYCHIATRY PROGRESS NOTE - PRN MEDS
MEDICATIONS  (PRN):  acetaminophen     Tablet .. 650 milliGRAM(s) Oral every 6 hours PRN Temp greater or equal to 38C (100.4F), Mild Pain (1 - 3)  albuterol    90 MICROgram(s) HFA Inhaler 2 Puff(s) Inhalation every 6 hours PRN Shortness of Breath and/or Wheezing  benzonatate 100 milliGRAM(s) Oral three times a day PRN Cough  dextrose Oral Gel 15 Gram(s) Oral once PRN Blood Glucose LESS THAN 70 milliGRAM(s)/deciliter  diphenhydrAMINE 50 milliGRAM(s) Oral every 6 hours PRN Combative behavior  lidocaine 2% Gel 1 Application(s) Topical every 6 hours PRN anal fissure pain  LORazepam     Tablet 2 milliGRAM(s) Oral every 6 hours PRN agitation  ondansetron    Tablet 4 milliGRAM(s) Oral every 8 hours PRN Nausea and/or Vomiting

## 2024-04-03 NOTE — BH INPATIENT PSYCHIATRY PROGRESS NOTE - NSBHFUPINTERVALHXFT_PSY_A_CORE
Patient was seen and evaluated this morning. Chart was reviewed and no acute events were noted. PRN Tylenol was administered at 13:19 on 4/02. Upon approach, patient was not responding to RN (who was attempting to give the patient her morning medications). Due to the patient's non-cooperation, the RN required assistance to transition the patient from a supine to a seated position. She maintained the seated position with her head facing the ground and her eyes closed. Despite repeated attempts to elicit a response by addressing her directly, she remained unresponsive, exhibiting no engagement with the interviewer.   Patient remains uncooperative throughout the interview and does not answer any questions asked. After the team leaves the patient's room and congregates in the hallway (in front of the patient's room), the patient briefly lifted her head, establishing eye contact with the team, before reverting to back to her previous position (head down, eyes closed). Despite this momentary acknowledgement, she continued to exhibit non-cooperation upon further attempts to elicit a response from her.   Patient was seen and evaluated this morning. Chart was reviewed and no acute events were noted. PRN Tylenol was administered at 13:19 on 4/02. Upon approach, patient was not responding to RN (who was attempting to give the patient her morning medications). Due to the patient's non-cooperation, the RN required assistance to transition the patient from a supine to a seated position. She maintained in the seated position with her head facing the ground and her eyes closed. Despite repeated attempts to elicit a response by addressing her directly, she remained unresponsive, exhibiting no engagement with the interviewer.   Patient remains uncooperative throughout the interview and does not answer any questions asked. After the team leaves the patient's room and congregates in the hallway (in front of the patient's room), the patient briefly lifted her head, establishing eye contact with the team, before reverting to back to her previous position (head down, eyes closed). Despite this momentary acknowledgement, she continued to exhibit non-cooperation upon further attempts to elicit a response from her.

## 2024-04-03 NOTE — BH INPATIENT PSYCHIATRY PROGRESS NOTE - NSBHCHARTREVIEWVS_PSY_A_CORE FT
Vital Signs Last 24 Hrs  T(C): 36.6 (04-03-24 @ 08:00), Max: 37.1 (04-02-24 @ 15:39)  T(F): 97.8 (04-03-24 @ 08:00), Max: 98.7 (04-02-24 @ 15:39)  HR: 67 (04-03-24 @ 08:00) (67 - 105)  BP: 107/72 (04-03-24 @ 08:00) (105/68 - 107/72)  BP(mean): --  RR: 132 (04-03-24 @ 08:00) (17 - 132)  SpO2: --

## 2024-04-03 NOTE — BH INPATIENT PSYCHIATRY PROGRESS NOTE - NSBHMETABOLIC_PSY_ALL_CORE_FT
BMI: BMI (kg/m2): 32.9 (03-31-24 @ 18:14)  HbA1c: A1C with Estimated Average Glucose Result: 12.1 % (04-01-24 @ 07:30)    Glucose: POCT Blood Glucose.: 272 mg/dL (04-03-24 @ 06:58)    BP: 107/72 (04-03-24 @ 08:00) (105/68 - 151/85)Vital Signs Last 24 Hrs  T(C): 36.6 (04-03-24 @ 08:00), Max: 37.1 (04-02-24 @ 15:39)  T(F): 97.8 (04-03-24 @ 08:00), Max: 98.7 (04-02-24 @ 15:39)  HR: 67 (04-03-24 @ 08:00) (67 - 105)  BP: 107/72 (04-03-24 @ 08:00) (105/68 - 107/72)  BP(mean): --  RR: 132 (04-03-24 @ 08:00) (17 - 132)  SpO2: --      Lipid Panel:

## 2024-04-03 NOTE — BH INPATIENT PSYCHIATRY PROGRESS NOTE - CURRENT MEDICATION
MEDICATIONS  (STANDING):  amitriptyline 75 milliGRAM(s) Oral daily  ARIPiprazole 2 milliGRAM(s) Oral daily  dextrose 5%. 1000 milliLiter(s) (100 mL/Hr) IV Continuous <Continuous>  dextrose 5%. 1000 milliLiter(s) (50 mL/Hr) IV Continuous <Continuous>  dextrose 50% Injectable 25 Gram(s) IV Push once  dextrose 50% Injectable 25 Gram(s) IV Push once  dextrose 50% Injectable 12.5 Gram(s) IV Push once  escitalopram 20 milliGRAM(s) Oral daily  glucagon  Injectable 1 milliGRAM(s) IntraMuscular once  insulin glargine Injectable (LANTUS) 20 Unit(s) SubCutaneous two times a day  insulin lispro (ADMELOG) corrective regimen sliding scale   SubCutaneous three times a day before meals  insulin lispro Injectable (ADMELOG) 10 Unit(s) SubCutaneous three times a day before meals  metoprolol tartrate 25 milliGRAM(s) Oral three times a day  pantoprazole    Tablet 40 milliGRAM(s) Oral before breakfast  senna 2 Tablet(s) Oral at bedtime    MEDICATIONS  (PRN):  acetaminophen     Tablet .. 650 milliGRAM(s) Oral every 6 hours PRN Temp greater or equal to 38C (100.4F), Mild Pain (1 - 3)  albuterol    90 MICROgram(s) HFA Inhaler 2 Puff(s) Inhalation every 6 hours PRN Shortness of Breath and/or Wheezing  benzonatate 100 milliGRAM(s) Oral three times a day PRN Cough  dextrose Oral Gel 15 Gram(s) Oral once PRN Blood Glucose LESS THAN 70 milliGRAM(s)/deciliter  diphenhydrAMINE 50 milliGRAM(s) Oral every 6 hours PRN Combative behavior  lidocaine 2% Gel 1 Application(s) Topical every 6 hours PRN anal fissure pain  LORazepam     Tablet 2 milliGRAM(s) Oral every 6 hours PRN agitation  ondansetron    Tablet 4 milliGRAM(s) Oral every 8 hours PRN Nausea and/or Vomiting   MEDICATIONS  (STANDING):  amitriptyline 75 milliGRAM(s) Oral daily  ARIPiprazole 2 milliGRAM(s) Oral daily  dextrose 5%. 1000 milliLiter(s) (100 mL/Hr) IV Continuous <Continuous>  dextrose 5%. 1000 milliLiter(s) (50 mL/Hr) IV Continuous <Continuous>  dextrose 50% Injectable 25 Gram(s) IV Push once  dextrose 50% Injectable 12.5 Gram(s) IV Push once  dextrose 50% Injectable 25 Gram(s) IV Push once  escitalopram 20 milliGRAM(s) Oral daily  glucagon  Injectable 1 milliGRAM(s) IntraMuscular once  insulin glargine Injectable (LANTUS) 20 Unit(s) SubCutaneous two times a day  insulin lispro (ADMELOG) corrective regimen sliding scale   SubCutaneous three times a day before meals  insulin lispro Injectable (ADMELOG) 10 Unit(s) SubCutaneous three times a day before meals  metoprolol tartrate 25 milliGRAM(s) Oral three times a day  pantoprazole    Tablet 40 milliGRAM(s) Oral before breakfast  senna 2 Tablet(s) Oral at bedtime    MEDICATIONS  (PRN):  acetaminophen     Tablet .. 650 milliGRAM(s) Oral every 6 hours PRN Temp greater or equal to 38C (100.4F), Mild Pain (1 - 3)  albuterol    90 MICROgram(s) HFA Inhaler 2 Puff(s) Inhalation every 6 hours PRN Shortness of Breath and/or Wheezing  benzonatate 100 milliGRAM(s) Oral three times a day PRN Cough  dextrose Oral Gel 15 Gram(s) Oral once PRN Blood Glucose LESS THAN 70 milliGRAM(s)/deciliter  diphenhydrAMINE 50 milliGRAM(s) Oral every 6 hours PRN Combative behavior  lidocaine 2% Gel 1 Application(s) Topical every 6 hours PRN anal fissure pain  LORazepam     Tablet 2 milliGRAM(s) Oral every 6 hours PRN agitation  ondansetron    Tablet 4 milliGRAM(s) Oral every 8 hours PRN Nausea and/or Vomiting

## 2024-04-04 LAB
GLUCOSE BLDC GLUCOMTR-MCNC: 232 MG/DL — HIGH (ref 70–99)
GLUCOSE BLDC GLUCOMTR-MCNC: 272 MG/DL — HIGH (ref 70–99)
GLUCOSE BLDC GLUCOMTR-MCNC: 279 MG/DL — HIGH (ref 70–99)
GLUCOSE BLDC GLUCOMTR-MCNC: 309 MG/DL — HIGH (ref 70–99)

## 2024-04-04 RX ORDER — INSULIN LISPRO 100/ML
6 VIAL (ML) SUBCUTANEOUS ONCE
Refills: 0 | Status: COMPLETED | OUTPATIENT
Start: 2024-04-04 | End: 2024-04-04

## 2024-04-04 RX ORDER — FLUOXETINE HCL 10 MG
10 CAPSULE ORAL DAILY
Refills: 0 | Status: DISCONTINUED | OUTPATIENT
Start: 2024-04-04 | End: 2024-04-10

## 2024-04-04 RX ORDER — ESCITALOPRAM OXALATE 10 MG/1
10 TABLET, FILM COATED ORAL AT BEDTIME
Refills: 0 | Status: DISCONTINUED | OUTPATIENT
Start: 2024-04-05 | End: 2024-04-10

## 2024-04-04 RX ADMIN — Medication 2: at 16:54

## 2024-04-04 RX ADMIN — Medication 10 MILLIGRAM(S): at 14:00

## 2024-04-04 RX ADMIN — Medication 25 MILLIGRAM(S): at 14:07

## 2024-04-04 RX ADMIN — Medication 25 MILLIGRAM(S): at 08:45

## 2024-04-04 RX ADMIN — Medication 10 UNIT(S): at 16:55

## 2024-04-04 RX ADMIN — PANTOPRAZOLE SODIUM 40 MILLIGRAM(S): 20 TABLET, DELAYED RELEASE ORAL at 06:33

## 2024-04-04 RX ADMIN — ESCITALOPRAM OXALATE 20 MILLIGRAM(S): 10 TABLET, FILM COATED ORAL at 08:44

## 2024-04-04 RX ADMIN — Medication 75 MILLIGRAM(S): at 08:44

## 2024-04-04 RX ADMIN — Medication 6 UNIT(S): at 20:01

## 2024-04-04 RX ADMIN — Medication 10 UNIT(S): at 08:01

## 2024-04-04 RX ADMIN — Medication 3: at 07:59

## 2024-04-04 RX ADMIN — Medication 10 UNIT(S): at 12:17

## 2024-04-04 RX ADMIN — INSULIN GLARGINE 20 UNIT(S): 100 INJECTION, SOLUTION SUBCUTANEOUS at 09:27

## 2024-04-04 RX ADMIN — Medication 3: at 12:17

## 2024-04-04 RX ADMIN — Medication 25 MILLIGRAM(S): at 20:00

## 2024-04-04 RX ADMIN — SENNA PLUS 2 TABLET(S): 8.6 TABLET ORAL at 20:00

## 2024-04-04 NOTE — BH INPATIENT PSYCHIATRY PROGRESS NOTE - NSBHASSESSSUMMFT_PSY_ALL_CORE
Ania Mei is a 38 year old  female, domiciled at [facility],  past medical history of T1D, diabetic neuropathy, HTN, chronic back pain, migraine headaches, pseudoseizures, with past psychiatric history of depression/anxiety and cluster B personality disorder, 8 previous psychiatric admissions (most recently discharged from [facility] on 6/13/23), multiple previous suicide attempts (remote hx of once by taking a capful of acetone, another by drinking hydrogen peroxide, another attempt by taking half a bottle of ibuprofen; most recent SA about a year ago by ingesting Pine-Sol, no known medical consequences), no legal or violence hx, denies substance use, BIBEMS activated by residence facility after patient expressed suicidal ideation. On assessment in the ED, patient presented with worsening depressive symptoms x2 months, passive suicidal ideation, unable to engage in safety planning, with reported adherence to medication management but with recent disengagement from outpatient care. She denied any AVH, did not appear acutely manic or psychotic.    On assessment on the inpatient unit, patient had linear thought process, normal speech and depressed but full affect. Patient presents with depressed mood, anhedonia, low energy, changed appetite, and suicidal ideation for 2 months, meeting criteria for major depressive episode. Patient denied any history of alvina. Given patient's numerous suicide attempts in the past, and current suicidality, in the context of cluster B personality disorder, patient would benefit from continued voluntary inpatient psychiatric care for safety planning and medication management.     During inpatient admission, patient continued to display linear thought process, depressed affect, and low energy. Given patient's PMHx of Type 1 DM and fingerstick values, endocrine was consulted and medical adjustments to regimen were made per their recommendations. Throughout her admission, she had multiple episodes of being uncooperative and nonverbal in which she maintained a passive and unresponsive demeanor throughout the interaction (despite attempts to elicit a response). Patient complained of adverse effect of Abilify including headache, nausea, vomiting, dizziness, etc. Patient continues to reports low mood, though suicidal ideation has resolved. Due to patient's severity of depressive symptoms, history of suicide attempts and impulsivity, continued hospitalization is required for medication management and safety.     Plan:  - Lexapro 10mg PO qHS - Started on admission as home medication, currently tapering off  - Start Prozac 10mg PO daily - 4/4  - C/W Amitriptyline 75mg PO daily - home med  - Abilify 2mg PO QD - discontinued 4/4 due to reported headache, nausea, vomiting, dizziness  - Discontinued Lamictal 50mg PO QHS - 4/02    - PRNs for agitation: Ativan 2mg + Benadryl 50mg PO/IM q6h    - C/W home medical medications    #Type 1 DM  Per endocrine recs:  - Increase Lantus from 20 units QD to 20 units BID - 4/02  - Start Lispro 10 units TID before meals - 4/02  - Continue current insulin sliding scale

## 2024-04-04 NOTE — BH INPATIENT PSYCHIATRY PROGRESS NOTE - CURRENT MEDICATION
MEDICATIONS  (STANDING):  amitriptyline 75 milliGRAM(s) Oral daily  dextrose 5%. 1000 milliLiter(s) (100 mL/Hr) IV Continuous <Continuous>  dextrose 5%. 1000 milliLiter(s) (50 mL/Hr) IV Continuous <Continuous>  dextrose 50% Injectable 25 Gram(s) IV Push once  dextrose 50% Injectable 12.5 Gram(s) IV Push once  dextrose 50% Injectable 25 Gram(s) IV Push once  FLUoxetine 10 milliGRAM(s) Oral daily  glucagon  Injectable 1 milliGRAM(s) IntraMuscular once  insulin glargine Injectable (LANTUS) 20 Unit(s) SubCutaneous two times a day  insulin lispro (ADMELOG) corrective regimen sliding scale   SubCutaneous three times a day before meals  insulin lispro Injectable (ADMELOG) 10 Unit(s) SubCutaneous three times a day before meals  metoprolol tartrate 25 milliGRAM(s) Oral three times a day  pantoprazole    Tablet 40 milliGRAM(s) Oral before breakfast  senna 2 Tablet(s) Oral at bedtime    MEDICATIONS  (PRN):  acetaminophen     Tablet .. 650 milliGRAM(s) Oral every 6 hours PRN Temp greater or equal to 38C (100.4F), Mild Pain (1 - 3)  albuterol    90 MICROgram(s) HFA Inhaler 2 Puff(s) Inhalation every 6 hours PRN Shortness of Breath and/or Wheezing  benzonatate 100 milliGRAM(s) Oral three times a day PRN Cough  dextrose Oral Gel 15 Gram(s) Oral once PRN Blood Glucose LESS THAN 70 milliGRAM(s)/deciliter  diphenhydrAMINE 50 milliGRAM(s) Oral every 6 hours PRN Combative behavior  lidocaine 2% Gel 1 Application(s) Topical every 6 hours PRN anal fissure pain  LORazepam     Tablet 2 milliGRAM(s) Oral every 6 hours PRN agitation  ondansetron    Tablet 4 milliGRAM(s) Oral every 8 hours PRN Nausea and/or Vomiting

## 2024-04-04 NOTE — BH INPATIENT PSYCHIATRY PROGRESS NOTE - NSBHMETABOLIC_PSY_ALL_CORE_FT
BMI: BMI (kg/m2): 32.9 (03-31-24 @ 18:14)  HbA1c: A1C with Estimated Average Glucose Result: 12.1 % (04-01-24 @ 07:30)    Glucose: POCT Blood Glucose.: 279 mg/dL (04-04-24 @ 06:33)    BP: 131/76 (04-04-24 @ 06:00) (105/68 - 151/85)Vital Signs Last 24 Hrs  T(C): 36.4 (04-04-24 @ 06:00), Max: 36.4 (04-04-24 @ 06:00)  T(F): 97.6 (04-04-24 @ 06:00), Max: 97.6 (04-04-24 @ 06:00)  HR: 115 (04-04-24 @ 06:00) (115 - 125)  BP: 131/76 (04-04-24 @ 06:00) (120/71 - 131/76)  BP(mean): --  RR: 16 (04-04-24 @ 06:00) (16 - 18)  SpO2: --      Lipid Panel:

## 2024-04-04 NOTE — BH INPATIENT PSYCHIATRY PROGRESS NOTE - NSBHCHARTREVIEWVS_PSY_A_CORE FT
Vital Signs Last 24 Hrs  T(C): 36.4 (04-04-24 @ 06:00), Max: 36.4 (04-04-24 @ 06:00)  T(F): 97.6 (04-04-24 @ 06:00), Max: 97.6 (04-04-24 @ 06:00)  HR: 115 (04-04-24 @ 06:00) (115 - 125)  BP: 131/76 (04-04-24 @ 06:00) (120/71 - 131/76)  BP(mean): --  RR: 16 (04-04-24 @ 06:00) (16 - 18)  SpO2: --

## 2024-04-04 NOTE — BH INPATIENT PSYCHIATRY PROGRESS NOTE - NSBHFUPINTERVALHXFT_PSY_A_CORE
Patient was seen and evaluated this morning. Chart was reviewed and staff reports that patient was "playing dead" for most of yesterday. No PRN medications were administered overnight. Upon approach, patient is lying in bed comfortably. Patient is alert and oriented and engages with interviewer. Patient is cooperative and answers all questions appropriately. Patient denies any current passive or active suicidal ideation, intent or plan and denies any homicidal ideation. Patient denies any persecutory delusions and denies any auditory or visual hallucinations. Patient reports good sleep and appetite. Denies side effects from medications.   Patient was seen and evaluated this morning. Chart was reviewed and staff reports that patient was refusing to engage with staff for most of yesterday and that she refused her morning medications while complaining of disliking Abilify. No PRN medications were administered overnight. Upon approach, patient is lying in bed comfortably. Patient is alert and oriented and engages with interviewer. Patient is cooperative and answers all questions appropriately. She reports previous suicidal thoughts consisted of thoughts such as "maybe it's better if I am not here." However, she denies current suicidal ideation. She reports that previous suicide attempts were impulsive. She reports nausea, vomiting, dizziness, shaking, headache, loss of appetite, feeling slow and lethargic and experiencing shortness of breath and feeling like her "heart was pounding" yesterday. She reports that her mood is horrible. Patient agreeable to discontinue Abilify and Lexapro, and to start a trial of Prozac.

## 2024-04-05 LAB
GLUCOSE BLDC GLUCOMTR-MCNC: 192 MG/DL — HIGH (ref 70–99)
GLUCOSE BLDC GLUCOMTR-MCNC: 265 MG/DL — HIGH (ref 70–99)
GLUCOSE BLDC GLUCOMTR-MCNC: 292 MG/DL — HIGH (ref 70–99)
GLUCOSE BLDC GLUCOMTR-MCNC: 342 MG/DL — HIGH (ref 70–99)

## 2024-04-05 RX ORDER — IBUPROFEN 200 MG
200 TABLET ORAL EVERY 6 HOURS
Refills: 0 | Status: DISCONTINUED | OUTPATIENT
Start: 2024-04-05 | End: 2024-04-10

## 2024-04-05 RX ADMIN — Medication 25 MILLIGRAM(S): at 20:34

## 2024-04-05 RX ADMIN — Medication 25 MILLIGRAM(S): at 08:58

## 2024-04-05 RX ADMIN — Medication 75 MILLIGRAM(S): at 08:57

## 2024-04-05 RX ADMIN — ESCITALOPRAM OXALATE 10 MILLIGRAM(S): 10 TABLET, FILM COATED ORAL at 20:35

## 2024-04-05 RX ADMIN — Medication 25 MILLIGRAM(S): at 12:43

## 2024-04-05 RX ADMIN — Medication 10 UNIT(S): at 16:44

## 2024-04-05 RX ADMIN — PANTOPRAZOLE SODIUM 40 MILLIGRAM(S): 20 TABLET, DELAYED RELEASE ORAL at 09:00

## 2024-04-05 RX ADMIN — INSULIN GLARGINE 20 UNIT(S): 100 INJECTION, SOLUTION SUBCUTANEOUS at 20:33

## 2024-04-05 RX ADMIN — Medication 10 UNIT(S): at 07:24

## 2024-04-05 RX ADMIN — INSULIN GLARGINE 20 UNIT(S): 100 INJECTION, SOLUTION SUBCUTANEOUS at 07:23

## 2024-04-05 RX ADMIN — SENNA PLUS 2 TABLET(S): 8.6 TABLET ORAL at 20:34

## 2024-04-05 RX ADMIN — Medication 1: at 16:44

## 2024-04-05 RX ADMIN — Medication 10 UNIT(S): at 11:42

## 2024-04-05 RX ADMIN — Medication 650 MILLIGRAM(S): at 20:39

## 2024-04-05 RX ADMIN — Medication 4: at 11:42

## 2024-04-05 RX ADMIN — Medication 3: at 07:22

## 2024-04-05 RX ADMIN — Medication 10 MILLIGRAM(S): at 10:06

## 2024-04-05 RX ADMIN — Medication 650 MILLIGRAM(S): at 20:40

## 2024-04-05 NOTE — BH INPATIENT PSYCHIATRY PROGRESS NOTE - CURRENT MEDICATION
MEDICATIONS  (STANDING):  amitriptyline 75 milliGRAM(s) Oral daily  dextrose 5%. 1000 milliLiter(s) (100 mL/Hr) IV Continuous <Continuous>  dextrose 5%. 1000 milliLiter(s) (50 mL/Hr) IV Continuous <Continuous>  dextrose 50% Injectable 25 Gram(s) IV Push once  dextrose 50% Injectable 12.5 Gram(s) IV Push once  dextrose 50% Injectable 25 Gram(s) IV Push once  escitalopram 10 milliGRAM(s) Oral at bedtime  FLUoxetine 10 milliGRAM(s) Oral daily  glucagon  Injectable 1 milliGRAM(s) IntraMuscular once  insulin glargine Injectable (LANTUS) 20 Unit(s) SubCutaneous two times a day  insulin lispro (ADMELOG) corrective regimen sliding scale   SubCutaneous three times a day before meals  insulin lispro Injectable (ADMELOG) 10 Unit(s) SubCutaneous three times a day before meals  metoprolol tartrate 25 milliGRAM(s) Oral three times a day  pantoprazole    Tablet 40 milliGRAM(s) Oral before breakfast  senna 2 Tablet(s) Oral at bedtime    MEDICATIONS  (PRN):  acetaminophen     Tablet .. 650 milliGRAM(s) Oral every 6 hours PRN Temp greater or equal to 38C (100.4F), Mild Pain (1 - 3)  albuterol    90 MICROgram(s) HFA Inhaler 2 Puff(s) Inhalation every 6 hours PRN Shortness of Breath and/or Wheezing  benzonatate 100 milliGRAM(s) Oral three times a day PRN Cough  dextrose Oral Gel 15 Gram(s) Oral once PRN Blood Glucose LESS THAN 70 milliGRAM(s)/deciliter  diphenhydrAMINE 50 milliGRAM(s) Oral every 6 hours PRN Combative behavior  lidocaine 2% Gel 1 Application(s) Topical every 6 hours PRN anal fissure pain  LORazepam     Tablet 2 milliGRAM(s) Oral every 6 hours PRN agitation  ondansetron    Tablet 4 milliGRAM(s) Oral every 8 hours PRN Nausea and/or Vomiting

## 2024-04-05 NOTE — BH INPATIENT PSYCHIATRY PROGRESS NOTE - NSBHMETABOLIC_PSY_ALL_CORE_FT
BMI: BMI (kg/m2): 32.9 (03-31-24 @ 18:14)  HbA1c: A1C with Estimated Average Glucose Result: 12.1 % (04-01-24 @ 07:30)    Glucose: POCT Blood Glucose.: 342 mg/dL (04-05-24 @ 11:23)    BP: 120/80 (04-05-24 @ 08:54) (105/68 - 131/78)Vital Signs Last 24 Hrs  T(C): 36.2 (04-05-24 @ 08:54), Max: 36.2 (04-05-24 @ 08:54)  T(F): 97.1 (04-05-24 @ 08:54), Max: 97.1 (04-05-24 @ 08:54)  HR: 113 (04-05-24 @ 08:54) (108 - 113)  BP: 120/80 (04-05-24 @ 08:54) (120/80 - 127/81)  BP(mean): --  RR: 16 (04-05-24 @ 08:54) (16 - 16)  SpO2: --      Lipid Panel:

## 2024-04-05 NOTE — BH INPATIENT PSYCHIATRY PROGRESS NOTE - NSBHFUPINTERVALHXFT_PSY_A_CORE
Patient was seen and evaluated this morning. Chart was reviewed and no acute events were noted. No PRN medications were administered overnight. Upon approach, patient is lying in bed comfortably. Patient is alert and oriented and engages with interviewer. Patient is cooperative and answers all questions appropriately. She reports that she is sleeping on and off, has body aches, and feels dizzy. However, she reports that her mood is a little better. She reports that she is complacent with her current medications. She denies suicidal ideation.

## 2024-04-05 NOTE — BH INPATIENT PSYCHIATRY PROGRESS NOTE - NSBHCHARTREVIEWVS_PSY_A_CORE FT
Vital Signs Last 24 Hrs  T(C): 36.2 (04-05-24 @ 08:54), Max: 36.2 (04-05-24 @ 08:54)  T(F): 97.1 (04-05-24 @ 08:54), Max: 97.1 (04-05-24 @ 08:54)  HR: 113 (04-05-24 @ 08:54) (108 - 113)  BP: 120/80 (04-05-24 @ 08:54) (120/80 - 127/81)  BP(mean): --  RR: 16 (04-05-24 @ 08:54) (16 - 16)  SpO2: --

## 2024-04-05 NOTE — BH INPATIENT PSYCHIATRY PROGRESS NOTE - NSBHASSESSSUMMFT_PSY_ALL_CORE
Ania Mei is a 38 year old  female, domiciled at [facility],  past medical history of T1D, diabetic neuropathy, HTN, chronic back pain, migraine headaches, pseudoseizures, with past psychiatric history of depression/anxiety and cluster B personality disorder, 8 previous psychiatric admissions (most recently discharged from [facility] on 6/13/23), multiple previous suicide attempts (remote hx of once by taking a capful of acetone, another by drinking hydrogen peroxide, another attempt by taking half a bottle of ibuprofen; most recent SA about a year ago by ingesting Pine-Sol, no known medical consequences), no legal or violence hx, denies substance use, BIBEMS activated by residence facility after patient expressed suicidal ideation. On assessment in the ED, patient presented with worsening depressive symptoms x2 months, passive suicidal ideation, unable to engage in safety planning, with reported adherence to medication management but with recent disengagement from outpatient care. She denied any AVH, did not appear acutely manic or psychotic.    On assessment on the inpatient unit, patient had linear thought process, normal speech and depressed but full affect. Patient presents with depressed mood, anhedonia, low energy, changed appetite, and suicidal ideation for 2 months, meeting criteria for major depressive episode. Patient denied any history of alvina. Given patient's numerous suicide attempts in the past, and current suicidality, in the context of cluster B personality disorder, patient would benefit from continued voluntary inpatient psychiatric care for safety planning and medication management.     During inpatient admission, patient continued to display linear thought process, depressed affect, and low energy. Given patient's PMHx of Type 1 DM and fingerstick values, endocrine was consulted and medical adjustments to regimen were made per their recommendations. Throughout her admission, she had multiple episodes of being uncooperative and nonverbal in which she maintained a passive and unresponsive demeanor throughout the interaction (despite attempts to elicit a response). Patient complained of adverse effect of Abilify including headache, nausea, vomiting, dizziness, etc. Patient continues to reports low mood, though suicidal ideation has resolved. Due to patient's severity of depressive symptoms, history of suicide attempts and impulsivity, continued hospitalization is required for medication management and safety.     4/5: Patient continues to report dizziness and now reporting body aches. However, she reports improved mood. No reports of patient being intentionally unresponsive to staff today or yesterday.     Plan:  - Lexapro 10mg PO qHS - Started on admission as home medication, currently tapering off  - Start Prozac 10mg PO daily - 4/4  - C/W Amitriptyline 75mg PO daily - home med  - Abilify 2mg PO QD - discontinued 4/4 due to reported headache, nausea, vomiting, dizziness  - Discontinued Lamictal 50mg PO QHS - 4/02    - PRNs for agitation: Ativan 2mg + Benadryl 50mg PO/IM q6h    - C/W home medical medications    #Type 1 DM  Per endocrine recs:  - Increase Lantus from 20 units QD to 20 units BID - 4/02  - Start Lispro 10 units TID before meals - 4/02  - Continue current insulin sliding scale

## 2024-04-06 LAB
GLUCOSE BLDC GLUCOMTR-MCNC: 258 MG/DL — HIGH (ref 70–99)
GLUCOSE BLDC GLUCOMTR-MCNC: 290 MG/DL — HIGH (ref 70–99)
GLUCOSE BLDC GLUCOMTR-MCNC: 294 MG/DL — HIGH (ref 70–99)
GLUCOSE BLDC GLUCOMTR-MCNC: 352 MG/DL — HIGH (ref 70–99)

## 2024-04-06 RX ADMIN — Medication 10 UNIT(S): at 11:48

## 2024-04-06 RX ADMIN — Medication 3: at 08:08

## 2024-04-06 RX ADMIN — Medication 10 UNIT(S): at 08:10

## 2024-04-06 RX ADMIN — Medication 25 MILLIGRAM(S): at 20:23

## 2024-04-06 RX ADMIN — Medication 10 MILLIGRAM(S): at 08:30

## 2024-04-06 RX ADMIN — INSULIN GLARGINE 20 UNIT(S): 100 INJECTION, SOLUTION SUBCUTANEOUS at 08:28

## 2024-04-06 RX ADMIN — Medication 25 MILLIGRAM(S): at 16:55

## 2024-04-06 RX ADMIN — Medication 75 MILLIGRAM(S): at 08:30

## 2024-04-06 RX ADMIN — Medication 10 UNIT(S): at 16:58

## 2024-04-06 RX ADMIN — ESCITALOPRAM OXALATE 10 MILLIGRAM(S): 10 TABLET, FILM COATED ORAL at 20:23

## 2024-04-06 RX ADMIN — Medication 3: at 11:48

## 2024-04-06 RX ADMIN — Medication 3: at 16:58

## 2024-04-06 RX ADMIN — Medication 25 MILLIGRAM(S): at 08:30

## 2024-04-06 RX ADMIN — PANTOPRAZOLE SODIUM 40 MILLIGRAM(S): 20 TABLET, DELAYED RELEASE ORAL at 06:22

## 2024-04-06 RX ADMIN — SENNA PLUS 2 TABLET(S): 8.6 TABLET ORAL at 20:23

## 2024-04-07 LAB
GLUCOSE BLDC GLUCOMTR-MCNC: 255 MG/DL — HIGH (ref 70–99)
GLUCOSE BLDC GLUCOMTR-MCNC: 280 MG/DL — HIGH (ref 70–99)
GLUCOSE BLDC GLUCOMTR-MCNC: 295 MG/DL — HIGH (ref 70–99)
GLUCOSE BLDC GLUCOMTR-MCNC: 347 MG/DL — HIGH (ref 70–99)

## 2024-04-07 RX ADMIN — Medication 3: at 16:52

## 2024-04-07 RX ADMIN — ESCITALOPRAM OXALATE 10 MILLIGRAM(S): 10 TABLET, FILM COATED ORAL at 20:51

## 2024-04-07 RX ADMIN — ONDANSETRON 4 MILLIGRAM(S): 8 TABLET, FILM COATED ORAL at 12:00

## 2024-04-07 RX ADMIN — Medication 10 MILLIGRAM(S): at 09:26

## 2024-04-07 RX ADMIN — Medication 75 MILLIGRAM(S): at 09:26

## 2024-04-07 RX ADMIN — INSULIN GLARGINE 20 UNIT(S): 100 INJECTION, SOLUTION SUBCUTANEOUS at 21:48

## 2024-04-07 RX ADMIN — SENNA PLUS 2 TABLET(S): 8.6 TABLET ORAL at 20:51

## 2024-04-07 RX ADMIN — PANTOPRAZOLE SODIUM 40 MILLIGRAM(S): 20 TABLET, DELAYED RELEASE ORAL at 09:26

## 2024-04-07 RX ADMIN — INSULIN GLARGINE 20 UNIT(S): 100 INJECTION, SOLUTION SUBCUTANEOUS at 09:19

## 2024-04-07 RX ADMIN — Medication 25 MILLIGRAM(S): at 20:51

## 2024-04-07 RX ADMIN — Medication 4: at 12:02

## 2024-04-07 RX ADMIN — Medication 10 UNIT(S): at 12:01

## 2024-04-07 RX ADMIN — Medication 10 UNIT(S): at 16:52

## 2024-04-07 RX ADMIN — Medication 10 UNIT(S): at 07:39

## 2024-04-07 RX ADMIN — Medication 25 MILLIGRAM(S): at 09:26

## 2024-04-07 RX ADMIN — Medication 3: at 07:38

## 2024-04-07 RX ADMIN — Medication 25 MILLIGRAM(S): at 14:39

## 2024-04-08 ENCOUNTER — APPOINTMENT (OUTPATIENT)
Dept: NUTRITION | Facility: CLINIC | Age: 39
End: 2024-04-08

## 2024-04-08 LAB
GLUCOSE BLDC GLUCOMTR-MCNC: 286 MG/DL — HIGH (ref 70–99)
GLUCOSE BLDC GLUCOMTR-MCNC: 324 MG/DL — HIGH (ref 70–99)
GLUCOSE BLDC GLUCOMTR-MCNC: 338 MG/DL — HIGH (ref 70–99)
GLUCOSE BLDC GLUCOMTR-MCNC: 384 MG/DL — HIGH (ref 70–99)

## 2024-04-08 RX ADMIN — Medication 10 UNIT(S): at 11:45

## 2024-04-08 RX ADMIN — ESCITALOPRAM OXALATE 10 MILLIGRAM(S): 10 TABLET, FILM COATED ORAL at 20:51

## 2024-04-08 RX ADMIN — INSULIN GLARGINE 20 UNIT(S): 100 INJECTION, SOLUTION SUBCUTANEOUS at 19:52

## 2024-04-08 RX ADMIN — Medication 10 UNIT(S): at 07:13

## 2024-04-08 RX ADMIN — Medication 4: at 16:07

## 2024-04-08 RX ADMIN — Medication 10 UNIT(S): at 16:07

## 2024-04-08 RX ADMIN — Medication 4: at 07:13

## 2024-04-08 RX ADMIN — SENNA PLUS 2 TABLET(S): 8.6 TABLET ORAL at 20:51

## 2024-04-08 RX ADMIN — Medication 25 MILLIGRAM(S): at 20:50

## 2024-04-08 RX ADMIN — INSULIN GLARGINE 20 UNIT(S): 100 INJECTION, SOLUTION SUBCUTANEOUS at 08:49

## 2024-04-08 RX ADMIN — Medication 5: at 11:44

## 2024-04-08 NOTE — BH INPATIENT PSYCHIATRY PROGRESS NOTE - NSBHASSESSSUMMFT_PSY_ALL_CORE
Ania Mei is a 38 year old  female, domiciled at [facility], past medical history of T1D, diabetic neuropathy, HTN, chronic back pain, migraine headaches, pseudoseizures, and with past psychiatric history of depression/anxiety and cluster B personality disorder, 8 previous psychiatric admissions (most recently discharged from [facility] on 6/13/23), multiple previous suicide attempts (remote hx of once by taking a capful of acetone, another by drinking hydrogen peroxide, another attempt by taking half a bottle of ibuprofen; most recent SA about a year ago by ingesting Pine-Sol, no known medical consequences), no legal or violence hx, denies substance use, BIBEMS activated by residence facility after patient expressed suicidal ideation. On assessment in the ED, patient presented with worsening depressive symptoms x2 months, passive suicidal ideation, unable to engage in safety planning, with reported adherence to medication management but with recent disengagement from outpatient care. She denied any AVH, did not appear acutely manic or psychotic.    On assessment on the inpatient unit, patient had linear thought process, normal speech and depressed but full affect. Patient presents with depressed mood, anhedonia, low energy, changed appetite, and suicidal ideation for 2 months, meeting criteria for major depressive episode. Patient denied any history of alvina. Given patient's numerous suicide attempts in the past, and current suicidality, in the context of cluster B personality disorder, patient would benefit from continued voluntary inpatient psychiatric care for safety planning and medication management.     During inpatient admission, patient continued to display linear thought process, depressed affect, and low energy. Given patient's PMHx of Type 1 DM and fingerstick values, endocrine was consulted and medical adjustments to regimen were made per their recommendations. Throughout her admission, she had multiple episodes of being uncooperative and nonverbal in which she maintained a passive and unresponsive demeanor throughout the interaction (despite attempts to elicit a response). Patient complained of adverse effect of Abilify including headache, nausea, vomiting, dizziness, etc. Patient continues to reports low mood, though suicidal ideation has resolved. Due to patient's severity of depressive symptoms, history of suicide attempts and impulsivity, continued hospitalization is required for medication management and safety.     4/5: Patient continues to report dizziness and now reporting body aches. However, she reports improved mood. No reports of patient being intentionally unresponsive to staff today or yesterday.     4/8: Patient did not cooperate with interview today or with nursing staff morning medication rounds. Patient was observed to be moving when she is not the focus, and then stops responding when spoken to or looked at.    Plan:  - Lexapro 10mg PO qHS - Started on admission as home medication, currently tapering off  - Start Prozac 10mg PO daily - 4/4  - C/W Amitriptyline 75mg PO daily - home med  - Abilify 2mg PO QD - discontinued 4/4 due to reported headache, nausea, vomiting, dizziness  - Discontinued Lamictal 50mg PO QHS - 4/02    - PRNs for agitation: Ativan 2mg + Benadryl 50mg PO/IM q6h    - C/W home medical medications    #Type 1 DM  Per endocrine recs:  - Increase Lantus from 20 units QD to 20 units BID - 4/02  - Start Lispro 10 units TID before meals - 4/02  - Continue current insulin sliding scale

## 2024-04-08 NOTE — BH INPATIENT PSYCHIATRY PROGRESS NOTE - CURRENT MEDICATION
MEDICATIONS  (STANDING):  amitriptyline 75 milliGRAM(s) Oral daily  dextrose 5%. 1000 milliLiter(s) (50 mL/Hr) IV Continuous <Continuous>  dextrose 5%. 1000 milliLiter(s) (100 mL/Hr) IV Continuous <Continuous>  dextrose 50% Injectable 12.5 Gram(s) IV Push once  dextrose 50% Injectable 25 Gram(s) IV Push once  dextrose 50% Injectable 25 Gram(s) IV Push once  escitalopram 10 milliGRAM(s) Oral at bedtime  FLUoxetine 10 milliGRAM(s) Oral daily  glucagon  Injectable 1 milliGRAM(s) IntraMuscular once  insulin glargine Injectable (LANTUS) 20 Unit(s) SubCutaneous two times a day  insulin lispro (ADMELOG) corrective regimen sliding scale   SubCutaneous three times a day before meals  insulin lispro Injectable (ADMELOG) 10 Unit(s) SubCutaneous three times a day before meals  metoprolol tartrate 25 milliGRAM(s) Oral three times a day  pantoprazole    Tablet 40 milliGRAM(s) Oral before breakfast  senna 2 Tablet(s) Oral at bedtime    MEDICATIONS  (PRN):  acetaminophen     Tablet .. 650 milliGRAM(s) Oral every 6 hours PRN Temp greater or equal to 38C (100.4F), Mild Pain (1 - 3)  albuterol    90 MICROgram(s) HFA Inhaler 2 Puff(s) Inhalation every 6 hours PRN Shortness of Breath and/or Wheezing  benzonatate 100 milliGRAM(s) Oral three times a day PRN Cough  dextrose Oral Gel 15 Gram(s) Oral once PRN Blood Glucose LESS THAN 70 milliGRAM(s)/deciliter  diphenhydrAMINE 50 milliGRAM(s) Oral every 6 hours PRN Combative behavior  ibuprofen  Tablet. 200 milliGRAM(s) Oral every 6 hours PRN Mild Pain (1 - 3), Moderate Pain (4 - 6), Severe Pain (7 - 10)  lidocaine 2% Gel 1 Application(s) Topical every 6 hours PRN anal fissure pain  LORazepam     Tablet 2 milliGRAM(s) Oral every 6 hours PRN agitation  ondansetron    Tablet 4 milliGRAM(s) Oral every 8 hours PRN Nausea and/or Vomiting

## 2024-04-08 NOTE — BH INPATIENT PSYCHIATRY PROGRESS NOTE - PRN MEDS
MEDICATIONS  (PRN):  acetaminophen     Tablet .. 650 milliGRAM(s) Oral every 6 hours PRN Temp greater or equal to 38C (100.4F), Mild Pain (1 - 3)  albuterol    90 MICROgram(s) HFA Inhaler 2 Puff(s) Inhalation every 6 hours PRN Shortness of Breath and/or Wheezing  benzonatate 100 milliGRAM(s) Oral three times a day PRN Cough  dextrose Oral Gel 15 Gram(s) Oral once PRN Blood Glucose LESS THAN 70 milliGRAM(s)/deciliter  diphenhydrAMINE 50 milliGRAM(s) Oral every 6 hours PRN Combative behavior  ibuprofen  Tablet. 200 milliGRAM(s) Oral every 6 hours PRN Mild Pain (1 - 3), Moderate Pain (4 - 6), Severe Pain (7 - 10)  lidocaine 2% Gel 1 Application(s) Topical every 6 hours PRN anal fissure pain  LORazepam     Tablet 2 milliGRAM(s) Oral every 6 hours PRN agitation  ondansetron    Tablet 4 milliGRAM(s) Oral every 8 hours PRN Nausea and/or Vomiting

## 2024-04-08 NOTE — ED ADULT NURSE NOTE - TEMPLATE
Called and spoke to pt . Pt is wanting to know if  will do a 2nd opinion for her mother . Pt will try to obtain records to bring for  to review.    General

## 2024-04-08 NOTE — BH INPATIENT PSYCHIATRY PROGRESS NOTE - NSBHFUPINTERVALHXFT_PSY_A_CORE
Per nursing staff, patient refused medication this morning and did not respond to verbal or tactile stimulation.     On approach, patient was seen to turn over in bed, but when spoke to patient did not respond. Patient had warm extremities, palpable pulse and had notable breathing motion of chest.

## 2024-04-08 NOTE — BH INPATIENT PSYCHIATRY PROGRESS NOTE - NSBHCHARTREVIEWVS_PSY_A_CORE FT
Vital Signs Last 24 Hrs  T(C): 35.8 (04-08-24 @ 08:00), Max: 35.9 (04-07-24 @ 15:43)  T(F): 96.4 (04-08-24 @ 08:00), Max: 96.7 (04-07-24 @ 15:43)  HR: 94 (04-08-24 @ 08:00) (94 - 107)  BP: 123/74 (04-08-24 @ 08:00) (123/60 - 123/74)  BP(mean): --  RR: 17 (04-07-24 @ 15:43) (17 - 17)  SpO2: --

## 2024-04-08 NOTE — ED PROVIDER NOTE - NS ED ROS FT
Constitutional:  see HPI  Head:  no headache, dizziness, loss of consciousness  Eyes:  no visual changes; no eye pain, redness, or discharge  ENMT:  no ear pain or discharge; no hearing problems; no mouth or throat sores or lesions; no throat pain  Cardiac: no chest pain, tachycardia or palpitations  Respiratory: no cough, wheezing, shortness of breath, chest tightness, or trouble breathing  GI: no nausea, vomiting, diarrhea or abdominal pain  :  no dysuria, frequency, or burning with urination; no change in urine output  MS: no myalgias, muscle weakness, joint pain,or  injury; no joint swelling  Neuro: no weakness; no numbness or tingling; no seizure  Skin:  no rashes or color changes; no lacerations or abrasions
decreased po intake

## 2024-04-08 NOTE — BH INPATIENT PSYCHIATRY PROGRESS NOTE - NSBHMETABOLIC_PSY_ALL_CORE_FT
BMI: BMI (kg/m2): 32.9 (03-31-24 @ 18:14)  HbA1c: A1C with Estimated Average Glucose Result: 12.1 % (04-01-24 @ 07:30)    Glucose: POCT Blood Glucose.: 384 mg/dL (04-08-24 @ 11:39)    BP: 123/74 (04-08-24 @ 08:00) (123/60 - 160/71)Vital Signs Last 24 Hrs  T(C): 35.8 (04-08-24 @ 08:00), Max: 35.9 (04-07-24 @ 15:43)  T(F): 96.4 (04-08-24 @ 08:00), Max: 96.7 (04-07-24 @ 15:43)  HR: 94 (04-08-24 @ 08:00) (94 - 107)  BP: 123/74 (04-08-24 @ 08:00) (123/60 - 123/74)  BP(mean): --  RR: 17 (04-07-24 @ 15:43) (17 - 17)  SpO2: --      Lipid Panel:

## 2024-04-09 ENCOUNTER — APPOINTMENT (OUTPATIENT)
Dept: PSYCHIATRY | Facility: CLINIC | Age: 39
End: 2024-04-09

## 2024-04-09 LAB
GLUCOSE BLDC GLUCOMTR-MCNC: 242 MG/DL — HIGH (ref 70–99)
GLUCOSE BLDC GLUCOMTR-MCNC: 263 MG/DL — HIGH (ref 70–99)
GLUCOSE BLDC GLUCOMTR-MCNC: 333 MG/DL — HIGH (ref 70–99)
GLUCOSE BLDC GLUCOMTR-MCNC: 373 MG/DL — HIGH (ref 70–99)

## 2024-04-09 RX ADMIN — Medication 75 MILLIGRAM(S): at 08:39

## 2024-04-09 RX ADMIN — Medication 4: at 07:24

## 2024-04-09 RX ADMIN — Medication 10 MILLIGRAM(S): at 08:40

## 2024-04-09 RX ADMIN — Medication 25 MILLIGRAM(S): at 16:46

## 2024-04-09 RX ADMIN — Medication 5: at 12:09

## 2024-04-09 RX ADMIN — Medication 25 MILLIGRAM(S): at 08:41

## 2024-04-09 RX ADMIN — Medication 10 UNIT(S): at 07:24

## 2024-04-09 RX ADMIN — Medication 650 MILLIGRAM(S): at 12:26

## 2024-04-09 RX ADMIN — PANTOPRAZOLE SODIUM 40 MILLIGRAM(S): 20 TABLET, DELAYED RELEASE ORAL at 08:40

## 2024-04-09 RX ADMIN — Medication 650 MILLIGRAM(S): at 12:10

## 2024-04-09 RX ADMIN — Medication 10 UNIT(S): at 12:09

## 2024-04-09 RX ADMIN — Medication 10 UNIT(S): at 16:41

## 2024-04-09 RX ADMIN — INSULIN GLARGINE 20 UNIT(S): 100 INJECTION, SOLUTION SUBCUTANEOUS at 07:25

## 2024-04-09 RX ADMIN — ESCITALOPRAM OXALATE 10 MILLIGRAM(S): 10 TABLET, FILM COATED ORAL at 20:02

## 2024-04-09 RX ADMIN — Medication 2: at 16:40

## 2024-04-09 RX ADMIN — INSULIN GLARGINE 20 UNIT(S): 100 INJECTION, SOLUTION SUBCUTANEOUS at 20:02

## 2024-04-09 RX ADMIN — Medication 25 MILLIGRAM(S): at 20:02

## 2024-04-09 NOTE — BH INPATIENT PSYCHIATRY PROGRESS NOTE - NSBHCHARTREVIEWVS_PSY_A_CORE FT
Vital Signs Last 24 Hrs  T(C): 36.1 (04-09-24 @ 08:00), Max: 36.8 (04-08-24 @ 15:55)  T(F): 97 (04-09-24 @ 08:00), Max: 98.3 (04-08-24 @ 15:55)  HR: 125 (04-09-24 @ 08:00) (106 - 125)  BP: 124/84 (04-09-24 @ 08:00) (124/84 - 132/70)  BP(mean): --  RR: 18 (04-09-24 @ 08:00) (16 - 18)  SpO2: --

## 2024-04-09 NOTE — BH INPATIENT PSYCHIATRY PROGRESS NOTE - NSBHFUPINTERVALHXFT_PSY_A_CORE
Per nursing staff, patient refused medication this morning and did not respond to verbal or tactile stimulation.     On approach, patient was seen to turn over in bed, but when spoke to patient did not respond verbally. Patient sobbed in the bed and did not answer any questions. Patient had warm extremities, palpable pulse and had notable breathing motion of chest.     Spoke to patient's mother this afternoon who stated patient has been telling her that the medication "makes her feel funny" and that she has been "struggling with back pain", two things that have "prevented" her from speaking to the team.

## 2024-04-09 NOTE — BH TREATMENT PLAN - NSTXDEPRESINTERRN_PSY_ALL_CORE
RN will monitor for depressive symptoms  RN will offer PRN medications
RN to encourage verbalization of feelings.  RN to encourage medication compliance and provide support and education as needed on Dx, coping skills, medication, and safety planning.  RN to encourage daily ADL's  RN to encourage group attendance  RN to assess and intervene for any depressive behaviors

## 2024-04-09 NOTE — BH INPATIENT PSYCHIATRY PROGRESS NOTE - CURRENT MEDICATION
MEDICATIONS  (STANDING):  amitriptyline 75 milliGRAM(s) Oral daily  dextrose 5%. 1000 milliLiter(s) (50 mL/Hr) IV Continuous <Continuous>  dextrose 5%. 1000 milliLiter(s) (100 mL/Hr) IV Continuous <Continuous>  dextrose 50% Injectable 12.5 Gram(s) IV Push once  dextrose 50% Injectable 25 Gram(s) IV Push once  dextrose 50% Injectable 25 Gram(s) IV Push once  escitalopram 10 milliGRAM(s) Oral at bedtime  FLUoxetine 10 milliGRAM(s) Oral daily  glucagon  Injectable 1 milliGRAM(s) IntraMuscular once  insulin glargine Injectable (LANTUS) 20 Unit(s) SubCutaneous two times a day  insulin lispro (ADMELOG) corrective regimen sliding scale   SubCutaneous three times a day before meals  insulin lispro Injectable (ADMELOG) 10 Unit(s) SubCutaneous three times a day before meals  metoprolol tartrate 25 milliGRAM(s) Oral three times a day  pantoprazole    Tablet 40 milliGRAM(s) Oral before breakfast  senna 2 Tablet(s) Oral at bedtime    MEDICATIONS  (PRN):  acetaminophen     Tablet .. 650 milliGRAM(s) Oral every 6 hours PRN Temp greater or equal to 38C (100.4F), Mild Pain (1 - 3)  albuterol    90 MICROgram(s) HFA Inhaler 2 Puff(s) Inhalation every 6 hours PRN Shortness of Breath and/or Wheezing  benzonatate 100 milliGRAM(s) Oral three times a day PRN Cough  dextrose Oral Gel 15 Gram(s) Oral once PRN Blood Glucose LESS THAN 70 milliGRAM(s)/deciliter  diphenhydrAMINE 50 milliGRAM(s) Oral every 6 hours PRN Combative behavior  ibuprofen  Tablet. 200 milliGRAM(s) Oral every 6 hours PRN Mild Pain (1 - 3), Moderate Pain (4 - 6), Severe Pain (7 - 10)  lidocaine 2% Gel 1 Application(s) Topical every 6 hours PRN anal fissure pain  ondansetron    Tablet 4 milliGRAM(s) Oral every 8 hours PRN Nausea and/or Vomiting

## 2024-04-09 NOTE — BH TREATMENT PLAN - NSDCCRITERIA_PSY_ALL_CORE
when patient feels safe to be discharged and is no longer a danger to self.
when patient feels safe to be discharged and is no longer a danger to self.

## 2024-04-09 NOTE — BH INPATIENT PSYCHIATRY PROGRESS NOTE - PRN MEDS
MEDICATIONS  (PRN):  acetaminophen     Tablet .. 650 milliGRAM(s) Oral every 6 hours PRN Temp greater or equal to 38C (100.4F), Mild Pain (1 - 3)  albuterol    90 MICROgram(s) HFA Inhaler 2 Puff(s) Inhalation every 6 hours PRN Shortness of Breath and/or Wheezing  benzonatate 100 milliGRAM(s) Oral three times a day PRN Cough  dextrose Oral Gel 15 Gram(s) Oral once PRN Blood Glucose LESS THAN 70 milliGRAM(s)/deciliter  diphenhydrAMINE 50 milliGRAM(s) Oral every 6 hours PRN Combative behavior  ibuprofen  Tablet. 200 milliGRAM(s) Oral every 6 hours PRN Mild Pain (1 - 3), Moderate Pain (4 - 6), Severe Pain (7 - 10)  lidocaine 2% Gel 1 Application(s) Topical every 6 hours PRN anal fissure pain  ondansetron    Tablet 4 milliGRAM(s) Oral every 8 hours PRN Nausea and/or Vomiting

## 2024-04-09 NOTE — ED ADULT NURSE NOTE - FINAL NURSING ELECTRONIC SIGNATURE
Clinic Care Coordination Contact  [facility]/Voicemail    Clinical Data: Care Coordinator Outreach    Outreach Documentation Number of Outreach Attempt   4/9/2024  12:58 PM 1       Left message on patient's voicemail with call back information and requested return call.    Plan: Care Coordinator will try to reach patient again in 1-2 business days.    LUCAS Barraza  [phone]  [facility]       21-Oct-2022 06:48

## 2024-04-09 NOTE — BH INPATIENT PSYCHIATRY PROGRESS NOTE - NSBHMSEAFFRANGE_PSY_A_CORE
Conjunctiva/sclera: Conjunctivae normal.      Pupils: Pupils are equal, round, and reactive to light. Cardiovascular:      Comments: Warm and well perfused  Pulmonary:      Effort: Pulmonary effort is normal.   Abdominal:      General: Abdomen is flat. Palpations: Abdomen is soft. Tenderness: There is abdominal tenderness in the right lower quadrant, suprapubic area and left lower quadrant. Musculoskeletal:         General: Normal range of motion. Cervical back: Normal range of motion. Skin:     General: Skin is warm and dry. Neurological:      General: No focal deficit present. Mental Status: She is alert and oriented to person, place, and time. Psychiatric:         Mood and Affect: Mood normal.         Behavior: Behavior normal.         Thought Content:  Thought content normal.         Judgment: Judgment normal.         DIAGNOSTIC RESULTS     EKG: All EKG's are interpreted by the Emergency Department Physician who either signs or Co-signs this chart in the absence of a cardiologist.        RADIOLOGY:   Non-plain film images such as CT, Ultrasound and MRI are read by the radiologist. Plain radiographic images are visualized and preliminarily interpreted by the emergency physician with the below findings:        Interpretation per the Radiologist below, if available at the time of this note:    US OB TRANSVAGINAL    (Results Pending)         ED BEDSIDE ULTRASOUND:   Performed by ED Physician - none    LABS:  Labs Reviewed   CBC WITH AUTO DIFFERENTIAL - Abnormal; Notable for the following components:       Result Value    WBC 15.9 (*)     RBC 2.16 (*)     Hemoglobin 7.5 (*)     Hematocrit 20.4 (*)     MCH 34.7 (*)     MCHC 36.8 (*)     RDW 19.0 (*)     Nucleated RBCs 0.5 (*)     nRBC 0.08 (*)     Neutrophils Absolute 9.8 (*)     Lymphocytes Absolute 4.4 (*)     Monocytes Absolute 1.4 (*)     Absolute Immature Granulocyte 0.1 (*)     All other components within normal limits Full

## 2024-04-09 NOTE — BH TREATMENT PLAN - NSTXSUICIDINTERSW_PSY_ALL_CORE
SW will encourage pt to attend at least 1 SW group/day and to identify coping skills to manage SI and protective factors for living.
SW will encourage pt to attend at least 1 SW group/day and to identify coping skills to manage SI and protective factors for living.

## 2024-04-09 NOTE — BH TREATMENT PLAN - NSTXPLANTHERAPYSESSIONSFT_PSY_ALL_CORE
04-04-24  Type of therapy: Coping skills, Creative arts therapy, Inspiration and motiviation, Leisure development, Self esteem, Social skills training, Stress management, Symptom management  Type of session: Group  Level of patient participation: Engaged, Participated with encouragement  Duration of participation: 45 minutes  Therapy conducted by: Psych rehab  Therapy Summary: RT will offer support and encouragement as needed .Pt has been attending RT sessions , pt enjoys creative arts , pt has been calm and cooperative during sessions .    04-08-24  Type of therapy: Coping skills, Creative arts therapy, Inspiration and motiviation, Leisure development, Self esteem, Stress management, Symptom management  Type of session: Group  Level of patient participation: Engaged, Participates  Duration of participation: 45 minutes  Therapy conducted by: Psych rehab  Therapy Summary: Pt is attending most RT sessions , pt enjoys music , creative arts and is social with select peers .

## 2024-04-09 NOTE — BH INPATIENT PSYCHIATRY PROGRESS NOTE - NSBHMETABOLIC_PSY_ALL_CORE_FT
BMI: BMI (kg/m2): 32.9 (03-31-24 @ 18:14)  HbA1c: A1C with Estimated Average Glucose Result: 12.1 % (04-01-24 @ 07:30)    Glucose: POCT Blood Glucose.: 373 mg/dL (04-09-24 @ 12:04)    BP: 124/84 (04-09-24 @ 08:00) (123/60 - 160/71)Vital Signs Last 24 Hrs  T(C): 36.1 (04-09-24 @ 08:00), Max: 36.8 (04-08-24 @ 15:55)  T(F): 97 (04-09-24 @ 08:00), Max: 98.3 (04-08-24 @ 15:55)  HR: 125 (04-09-24 @ 08:00) (106 - 125)  BP: 124/84 (04-09-24 @ 08:00) (124/84 - 132/70)  BP(mean): --  RR: 18 (04-09-24 @ 08:00) (16 - 18)  SpO2: --      Lipid Panel:

## 2024-04-09 NOTE — BH INPATIENT PSYCHIATRY PROGRESS NOTE - NSBHASSESSSUMMFT_PSY_ALL_CORE
Ania Mei is a 38 year old  female, domiciled at [facility], past medical history of T1D, diabetic neuropathy, HTN, chronic back pain, migraine headaches, pseudoseizures, and with past psychiatric history of depression/anxiety and cluster B personality disorder, 8 previous psychiatric admissions (most recently discharged from [facility] on 6/13/23), multiple previous suicide attempts (remote hx of once by taking a capful of acetone, another by drinking hydrogen peroxide, another attempt by taking half a bottle of ibuprofen; most recent SA about a year ago by ingesting Pine-Sol, no known medical consequences), no legal or violence hx, denies substance use, BIBEMS activated by residence facility after patient expressed suicidal ideation. On assessment in the ED, patient presented with worsening depressive symptoms x2 months, passive suicidal ideation, unable to engage in safety planning, with reported adherence to medication management but with recent disengagement from outpatient care. She denied any AVH, did not appear acutely manic or psychotic.    On assessment on the inpatient unit, patient had linear thought process, normal speech and depressed but full affect. Patient presents with depressed mood, anhedonia, low energy, changed appetite, and suicidal ideation for 2 months, meeting criteria for major depressive episode. Patient denied any history of alvina. Given patient's numerous suicide attempts in the past, and current suicidality, in the context of cluster B personality disorder, patient would benefit from continued voluntary inpatient psychiatric care for safety planning and medication management.     During inpatient admission, patient continued to display linear thought process, depressed affect, and low energy. Given patient's PMHx of Type 1 DM and fingerstick values, endocrine was consulted and medical adjustments to regimen were made per their recommendations. Throughout her admission, she had multiple episodes of being uncooperative and nonverbal in which she maintained a passive and unresponsive demeanor throughout the interaction (despite attempts to elicit a response). Patient complained of adverse effect of Abilify including headache, nausea, vomiting, dizziness, etc. Patient continues to reports low mood, though suicidal ideation has resolved. Due to patient's severity of depressive symptoms, history of suicide attempts and impulsivity, continued hospitalization is required for medication management and safety.     4/5: Patient continues to report dizziness and now reporting body aches. However, she reports improved mood. No reports of patient being intentionally unresponsive to staff today or yesterday.     4/8: Patient did not cooperate with interview today or with nursing staff morning medication rounds. Patient was observed to be moving when she is not the focus, and then stops responding when spoken to or looked at.    4/9: Patient did not cooperate with interview today or with nursing staff morning medication rounds. Spoke to patients mother for updates and to gather additional collateral when she visited the unit in person.    Plan:  - Lexapro 10mg PO qHS - Started on admission as home medication, currently tapering off  - Start Prozac 10mg PO daily - 4/4  - C/W Amitriptyline 75mg PO daily - home med  - Abilify 2mg PO QD - discontinued 4/4 due to reported headache, nausea, vomiting, dizziness  - Discontinued Lamictal 50mg PO QHS - 4/02    - PRNs for agitation: Ativan 2mg + Benadryl 50mg PO/IM q6h    - C/W home medical medications    #Type 1 DM  Per endocrine recs:  - Increase Lantus from 20 units QD to 20 units BID - 4/02  - Start Lispro 10 units TID before meals - 4/02  - Continue current insulin sliding scale

## 2024-04-09 NOTE — BH TREATMENT PLAN - NSTXSUICIDINTERRN_PSY_ALL_CORE
RN to assess patients behavior and be alert for increased signs of impulsivity/agitation.  RN will monitor patient and provide support and comfort and provedie safety on unit.    RN to encourage medication compliance and provide support and education as needed on Dx, coping skills, medication, and safety planning.  RN to Encourage daily ADL's  RN to Encourage group attendance  RN will assess and intervene for any suicidal ideations
RN will monitor for sucidality  RN will offer PRN medications

## 2024-04-10 LAB
GLUCOSE BLDC GLUCOMTR-MCNC: 274 MG/DL — HIGH (ref 70–99)
GLUCOSE BLDC GLUCOMTR-MCNC: 319 MG/DL — HIGH (ref 70–99)
GLUCOSE BLDC GLUCOMTR-MCNC: 355 MG/DL — HIGH (ref 70–99)
GLUCOSE BLDC GLUCOMTR-MCNC: 415 MG/DL — HIGH (ref 70–99)

## 2024-04-10 PROCEDURE — 99253 IP/OBS CNSLTJ NEW/EST LOW 45: CPT

## 2024-04-10 RX ORDER — FLUOXETINE HCL 10 MG
10 CAPSULE ORAL ONCE
Refills: 0 | Status: COMPLETED | OUTPATIENT
Start: 2024-04-10 | End: 2024-04-10

## 2024-04-10 RX ORDER — INSULIN GLARGINE 100 [IU]/ML
24 INJECTION, SOLUTION SUBCUTANEOUS
Refills: 0 | Status: DISCONTINUED | OUTPATIENT
Start: 2024-04-10 | End: 2024-04-12

## 2024-04-10 RX ORDER — FLUOXETINE HCL 10 MG
20 CAPSULE ORAL DAILY
Refills: 0 | Status: DISCONTINUED | OUTPATIENT
Start: 2024-04-11 | End: 2024-04-12

## 2024-04-10 RX ORDER — INSULIN LISPRO 100/ML
14 VIAL (ML) SUBCUTANEOUS
Refills: 0 | Status: DISCONTINUED | OUTPATIENT
Start: 2024-04-10 | End: 2024-04-12

## 2024-04-10 RX ORDER — IBUPROFEN 200 MG
800 TABLET ORAL EVERY 6 HOURS
Refills: 0 | Status: DISCONTINUED | OUTPATIENT
Start: 2024-04-10 | End: 2024-04-12

## 2024-04-10 RX ORDER — ACETAMINOPHEN 500 MG
650 TABLET ORAL EVERY 6 HOURS
Refills: 0 | Status: DISCONTINUED | OUTPATIENT
Start: 2024-04-10 | End: 2024-04-12

## 2024-04-10 RX ADMIN — Medication 25 MILLIGRAM(S): at 08:17

## 2024-04-10 RX ADMIN — Medication 75 MILLIGRAM(S): at 08:16

## 2024-04-10 RX ADMIN — Medication 800 MILLIGRAM(S): at 20:54

## 2024-04-10 RX ADMIN — Medication 25 MILLIGRAM(S): at 13:42

## 2024-04-10 RX ADMIN — INSULIN GLARGINE 24 UNIT(S): 100 INJECTION, SOLUTION SUBCUTANEOUS at 20:47

## 2024-04-10 RX ADMIN — PANTOPRAZOLE SODIUM 40 MILLIGRAM(S): 20 TABLET, DELAYED RELEASE ORAL at 06:31

## 2024-04-10 RX ADMIN — Medication 10 UNIT(S): at 11:55

## 2024-04-10 RX ADMIN — Medication 10 UNIT(S): at 16:23

## 2024-04-10 RX ADMIN — Medication 6: at 16:22

## 2024-04-10 RX ADMIN — Medication 10 UNIT(S): at 08:14

## 2024-04-10 RX ADMIN — INSULIN GLARGINE 20 UNIT(S): 100 INJECTION, SOLUTION SUBCUTANEOUS at 08:15

## 2024-04-10 RX ADMIN — Medication 5: at 08:12

## 2024-04-10 RX ADMIN — Medication 3: at 11:54

## 2024-04-10 RX ADMIN — Medication 10 MILLIGRAM(S): at 08:17

## 2024-04-10 RX ADMIN — Medication 800 MILLIGRAM(S): at 12:07

## 2024-04-10 RX ADMIN — Medication 800 MILLIGRAM(S): at 11:56

## 2024-04-10 RX ADMIN — Medication 25 MILLIGRAM(S): at 20:43

## 2024-04-10 RX ADMIN — Medication 10 MILLIGRAM(S): at 11:56

## 2024-04-10 RX ADMIN — SENNA PLUS 2 TABLET(S): 8.6 TABLET ORAL at 20:43

## 2024-04-10 RX ADMIN — Medication 800 MILLIGRAM(S): at 20:55

## 2024-04-10 NOTE — BH DISCHARGE NOTE NURSING/SOCIAL WORK/PSYCH REHAB - NSDCADDINFO2FT_PSY_ALL_CORE
3
Please go to [facility] [address] to use the HUB for appointment with Dr. Curiel

## 2024-04-10 NOTE — CHART NOTE - NSCHARTNOTEFT_GEN_A_CORE
pt  with   before dinner pt to receive 16 units of insulin   pt seen by Endo today per nelli  lantus increased to 24 units BID   lispro increased  to 14 TIDAC   if FS remains elevated consider increasing sliding scale as well

## 2024-04-10 NOTE — CONSULT NOTE ADULT - SUBJECTIVE AND OBJECTIVE BOX
HPI: Patient is a 38y old Female with a PMH Of type 1 DM who presents with a chief complaint of suicidal ideation      PAST MEDICAL & SURGICAL HISTORY  Neuropathy  right lower extremity, vaginal    Type 1 diabetes    Degenerative disc disease, thoracic    Urinary tract infection, recurrent    Gastroesophageal reflux disease, esophagitis presence not specified    Intractable headache, unspecified chronicity pattern, unspecified headache type    Major depressive disorder with single episode, in full remission  previously treated with zyprexa, celexa and lexapro    Tremor of right hand    Tachycardia    Spinal stenosis    No significant past surgical history        FAMILY HISTORY:  FAMILY HISTORY:      SOCIAL HISTORY:  []smoker  []Alcohol  []Drug    ALLERGIES:  Clindamycin Phosphate (Unknown)  amoxicillin (Hives)  Fluad 0.5 ML ODETTE (Unknown)  clindamycin (Hives; Nephrotoxicity)  penicillins (Hives)  Ceclor (Rash)      MEDICATIONS:  MEDICATIONS  (STANDING):  amitriptyline 75 milliGRAM(s) Oral daily  dextrose 5%. 1000 milliLiter(s) (100 mL/Hr) IV Continuous <Continuous>  dextrose 5%. 1000 milliLiter(s) (50 mL/Hr) IV Continuous <Continuous>  dextrose 50% Injectable 25 Gram(s) IV Push once  dextrose 50% Injectable 12.5 Gram(s) IV Push once  dextrose 50% Injectable 25 Gram(s) IV Push once  glucagon  Injectable 1 milliGRAM(s) IntraMuscular once  insulin glargine Injectable (LANTUS) 20 Unit(s) SubCutaneous two times a day  insulin lispro (ADMELOG) corrective regimen sliding scale   SubCutaneous three times a day before meals  insulin lispro Injectable (ADMELOG) 10 Unit(s) SubCutaneous three times a day before meals  metoprolol tartrate 25 milliGRAM(s) Oral three times a day  pantoprazole    Tablet 40 milliGRAM(s) Oral before breakfast  senna 2 Tablet(s) Oral at bedtime    MEDICATIONS  (PRN):  acetaminophen     Tablet .. 650 milliGRAM(s) Oral every 6 hours PRN Temp greater or equal to 38C (100.4F), Mild Pain (1 - 3)  albuterol    90 MICROgram(s) HFA Inhaler 2 Puff(s) Inhalation every 6 hours PRN Shortness of Breath and/or Wheezing  benzonatate 100 milliGRAM(s) Oral three times a day PRN Cough  dextrose Oral Gel 15 Gram(s) Oral once PRN Blood Glucose LESS THAN 70 milliGRAM(s)/deciliter  diphenhydrAMINE 50 milliGRAM(s) Oral every 6 hours PRN Combative behavior  ibuprofen  Tablet. 800 milliGRAM(s) Oral every 6 hours PRN Moderate Pain (4 - 6), Severe Pain (7 - 10)  lidocaine 2% Gel 1 Application(s) Topical every 6 hours PRN anal fissure pain  ondansetron    Tablet 4 milliGRAM(s) Oral every 8 hours PRN Nausea and/or Vomiting      HOME MEDICATIONS:  Home Medications:  pantoprazole 40 mg oral delayed release tablet: 1 tab(s) orally once a day (before a meal) (12 Jun 2023 12:05)      VITALS:   T(F): 96.1 (04-10 @ 07:47), Max: 98.3 (04-08 @ 15:55)  HR: 99 (04-10 @ 07:47) (93 - 125)  BP: 125/55 (04-10 @ 07:47) (123/60 - 160/71)  BP(mean): --  RR: 18 (04-10 @ 07:47) (16 - 20)  SpO2: --    I&O's Summary        LABS:                    POCT Blood Glucose.: 274 mg/dL (04-10-24 @ 10:57)  POCT Blood Glucose.: 355 mg/dL (04-10-24 @ 06:34)  POCT Blood Glucose.: 263 mg/dL (04-09-24 @ 19:52)  POCT Blood Glucose.: 242 mg/dL (04-09-24 @ 16:17)  POCT Blood Glucose.: 373 mg/dL (04-09-24 @ 12:04)  POCT Blood Glucose.: 333 mg/dL (04-09-24 @ 06:32)  POCT Blood Glucose.: 286 mg/dL (04-08-24 @ 19:30)  POCT Blood Glucose.: 338 mg/dL (04-08-24 @ 16:04)  POCT Blood Glucose.: 384 mg/dL (04-08-24 @ 11:39)  POCT Blood Glucose.: 324 mg/dL (04-08-24 @ 06:43)  POCT Blood Glucose.: 255 mg/dL (04-07-24 @ 20:06)  POCT Blood Glucose.: 280 mg/dL (04-07-24 @ 16:19)

## 2024-04-10 NOTE — BH INPATIENT PSYCHIATRY PROGRESS NOTE - PRN MEDS
MEDICATIONS  (PRN):  acetaminophen     Tablet .. 650 milliGRAM(s) Oral every 6 hours PRN Temp greater or equal to 38C (100.4F), Mild Pain (1 - 3)  albuterol    90 MICROgram(s) HFA Inhaler 2 Puff(s) Inhalation every 6 hours PRN Shortness of Breath and/or Wheezing  benzonatate 100 milliGRAM(s) Oral three times a day PRN Cough  dextrose Oral Gel 15 Gram(s) Oral once PRN Blood Glucose LESS THAN 70 milliGRAM(s)/deciliter  diphenhydrAMINE 50 milliGRAM(s) Oral every 6 hours PRN Combative behavior  ibuprofen  Tablet. 200 milliGRAM(s) Oral every 6 hours PRN Mild Pain (1 - 3), Moderate Pain (4 - 6), Severe Pain (7 - 10)  lidocaine 2% Gel 1 Application(s) Topical every 6 hours PRN anal fissure pain  ondansetron    Tablet 4 milliGRAM(s) Oral every 8 hours PRN Nausea and/or Vomiting   MEDICATIONS  (PRN):  acetaminophen     Tablet .. 650 milliGRAM(s) Oral every 6 hours PRN Temp greater or equal to 38C (100.4F), Mild Pain (1 - 3)  albuterol    90 MICROgram(s) HFA Inhaler 2 Puff(s) Inhalation every 6 hours PRN Shortness of Breath and/or Wheezing  benzonatate 100 milliGRAM(s) Oral three times a day PRN Cough  dextrose Oral Gel 15 Gram(s) Oral once PRN Blood Glucose LESS THAN 70 milliGRAM(s)/deciliter  diphenhydrAMINE 50 milliGRAM(s) Oral every 6 hours PRN Combative behavior  ibuprofen  Tablet. 800 milliGRAM(s) Oral every 6 hours PRN Moderate Pain (4 - 6), Severe Pain (7 - 10)  lidocaine 2% Gel 1 Application(s) Topical every 6 hours PRN anal fissure pain  ondansetron    Tablet 4 milliGRAM(s) Oral every 8 hours PRN Nausea and/or Vomiting

## 2024-04-10 NOTE — CONSULT NOTE ADULT - ASSESSMENT
Ania is well known to me from outpatient settings, poorly controlled type 1 DM with insulin resistance , admitted for Major depression, suicidal ideation       # poorly controlled type 2 DM :  -4/1/24 A1c 10.1% at home on insulin pump , non compliant with taking boluses   - now on Lantus 20 units BID and lispro 10 units with meals with sliding scale   -- FS reviewed remain above target   - increase Lantus to 24 units BID   - Increase lispro to 14 units TIDAC   - fow now continue with sliding scale 1: 50 > 150 but if remain uncontrolled can change to moderate scale  Ania is well known to me from outpatient settings, poorly controlled type 1 DM with insulin resistance , admitted for Major depression, suicidal ideation       # poorly controlled type 1 DM :  -4/1/24 A1c 10.1% at home on insulin pump , non compliant with taking boluses   - now on Lantus 20 units BID and lispro 10 units with meals with sliding scale   -- FS reviewed remain above target   - increase Lantus to 24 units BID   - Increase lispro to 14 units TIDAC   - fow now continue with sliding scale 1: 50 > 150 but if remain uncontrolled can change to moderate scale

## 2024-04-10 NOTE — BH INPATIENT PSYCHIATRY PROGRESS NOTE - CURRENT MEDICATION
MEDICATIONS  (STANDING):  amitriptyline 75 milliGRAM(s) Oral daily  dextrose 5%. 1000 milliLiter(s) (100 mL/Hr) IV Continuous <Continuous>  dextrose 5%. 1000 milliLiter(s) (50 mL/Hr) IV Continuous <Continuous>  dextrose 50% Injectable 25 Gram(s) IV Push once  dextrose 50% Injectable 12.5 Gram(s) IV Push once  dextrose 50% Injectable 25 Gram(s) IV Push once  escitalopram 10 milliGRAM(s) Oral at bedtime  FLUoxetine 10 milliGRAM(s) Oral daily  glucagon  Injectable 1 milliGRAM(s) IntraMuscular once  insulin glargine Injectable (LANTUS) 20 Unit(s) SubCutaneous two times a day  insulin lispro (ADMELOG) corrective regimen sliding scale   SubCutaneous three times a day before meals  insulin lispro Injectable (ADMELOG) 10 Unit(s) SubCutaneous three times a day before meals  metoprolol tartrate 25 milliGRAM(s) Oral three times a day  pantoprazole    Tablet 40 milliGRAM(s) Oral before breakfast  senna 2 Tablet(s) Oral at bedtime    MEDICATIONS  (PRN):  acetaminophen     Tablet .. 650 milliGRAM(s) Oral every 6 hours PRN Temp greater or equal to 38C (100.4F), Mild Pain (1 - 3)  albuterol    90 MICROgram(s) HFA Inhaler 2 Puff(s) Inhalation every 6 hours PRN Shortness of Breath and/or Wheezing  benzonatate 100 milliGRAM(s) Oral three times a day PRN Cough  dextrose Oral Gel 15 Gram(s) Oral once PRN Blood Glucose LESS THAN 70 milliGRAM(s)/deciliter  diphenhydrAMINE 50 milliGRAM(s) Oral every 6 hours PRN Combative behavior  ibuprofen  Tablet. 200 milliGRAM(s) Oral every 6 hours PRN Mild Pain (1 - 3), Moderate Pain (4 - 6), Severe Pain (7 - 10)  lidocaine 2% Gel 1 Application(s) Topical every 6 hours PRN anal fissure pain  ondansetron    Tablet 4 milliGRAM(s) Oral every 8 hours PRN Nausea and/or Vomiting   MEDICATIONS  (STANDING):  amitriptyline 75 milliGRAM(s) Oral daily  dextrose 5%. 1000 milliLiter(s) (100 mL/Hr) IV Continuous <Continuous>  dextrose 5%. 1000 milliLiter(s) (50 mL/Hr) IV Continuous <Continuous>  dextrose 50% Injectable 25 Gram(s) IV Push once  dextrose 50% Injectable 12.5 Gram(s) IV Push once  dextrose 50% Injectable 25 Gram(s) IV Push once  glucagon  Injectable 1 milliGRAM(s) IntraMuscular once  insulin glargine Injectable (LANTUS) 20 Unit(s) SubCutaneous two times a day  insulin lispro (ADMELOG) corrective regimen sliding scale   SubCutaneous three times a day before meals  insulin lispro Injectable (ADMELOG) 10 Unit(s) SubCutaneous three times a day before meals  metoprolol tartrate 25 milliGRAM(s) Oral three times a day  pantoprazole    Tablet 40 milliGRAM(s) Oral before breakfast  senna 2 Tablet(s) Oral at bedtime    MEDICATIONS  (PRN):  acetaminophen     Tablet .. 650 milliGRAM(s) Oral every 6 hours PRN Temp greater or equal to 38C (100.4F), Mild Pain (1 - 3)  albuterol    90 MICROgram(s) HFA Inhaler 2 Puff(s) Inhalation every 6 hours PRN Shortness of Breath and/or Wheezing  benzonatate 100 milliGRAM(s) Oral three times a day PRN Cough  dextrose Oral Gel 15 Gram(s) Oral once PRN Blood Glucose LESS THAN 70 milliGRAM(s)/deciliter  diphenhydrAMINE 50 milliGRAM(s) Oral every 6 hours PRN Combative behavior  ibuprofen  Tablet. 800 milliGRAM(s) Oral every 6 hours PRN Moderate Pain (4 - 6), Severe Pain (7 - 10)  lidocaine 2% Gel 1 Application(s) Topical every 6 hours PRN anal fissure pain  ondansetron    Tablet 4 milliGRAM(s) Oral every 8 hours PRN Nausea and/or Vomiting

## 2024-04-10 NOTE — BH DISCHARGE NOTE NURSING/SOCIAL WORK/PSYCH REHAB - PATIENT PORTAL LINK FT
You can access the FollowMyHealth Patient Portal offered by [facility] by registering at the following website: [email or web address]. By joining [employer]’s FollowMyHealth portal, you will also be able to view your health information using other applications (apps) compatible with our system.

## 2024-04-10 NOTE — BH DISCHARGE NOTE NURSING/SOCIAL WORK/PSYCH REHAB - NSCDUDCCRISIS_PSY_A_CORE
[facility] Well  1 [phone] [facility] ([phone])  Text "WELL" to [phone]  Website: [email or web address] Suicide Prevention Lifeline 2 [phone]) 633-4287(631) 319-1942/988 Suicide and Crisis Lifeline

## 2024-04-10 NOTE — BH INPATIENT PSYCHIATRY PROGRESS NOTE - NSBHMETABOLIC_PSY_ALL_CORE_FT
BMI: BMI (kg/m2): 32.9 (03-31-24 @ 18:14)  HbA1c: A1C with Estimated Average Glucose Result: 12.1 % (04-01-24 @ 07:30)    Glucose: POCT Blood Glucose.: 355 mg/dL (04-10-24 @ 06:34)    BP: 125/55 (04-10-24 @ 07:47) (123/60 - 160/71)Vital Signs Last 24 Hrs  T(C): 35.6 (04-10-24 @ 07:47), Max: 36.6 (04-09-24 @ 15:56)  T(F): 96.1 (04-10-24 @ 07:47), Max: 97.8 (04-09-24 @ 15:56)  HR: 99 (04-10-24 @ 07:47) (99 - 125)  BP: 125/55 (04-10-24 @ 07:47) (124/59 - 125/55)  BP(mean): --  RR: 18 (04-10-24 @ 07:47) (18 - 20)  SpO2: --      Lipid Panel:  BMI: BMI (kg/m2): 32.9 (03-31-24 @ 18:14)  HbA1c: A1C with Estimated Average Glucose Result: 12.1 % (04-01-24 @ 07:30)    Glucose: POCT Blood Glucose.: 274 mg/dL (04-10-24 @ 10:57)    BP: 125/55 (04-10-24 @ 07:47) (123/60 - 132/70)Vital Signs Last 24 Hrs  T(C): 35.6 (04-10-24 @ 07:47), Max: 36.6 (04-09-24 @ 15:56)  T(F): 96.1 (04-10-24 @ 07:47), Max: 97.8 (04-09-24 @ 15:56)  HR: 99 (04-10-24 @ 07:47) (99 - 100)  BP: 125/55 (04-10-24 @ 07:47) (124/59 - 125/55)  BP(mean): --  RR: 18 (04-10-24 @ 07:47) (18 - 20)  SpO2: --      Lipid Panel:

## 2024-04-10 NOTE — BH INPATIENT PSYCHIATRY PROGRESS NOTE - NSBHCHARTREVIEWVS_PSY_A_CORE FT
Vital Signs Last 24 Hrs  T(C): 35.6 (10 Apr 2024 07:47), Max: 36.6 (09 Apr 2024 15:56)  T(F): 96.1 (10 Apr 2024 07:47), Max: 97.8 (09 Apr 2024 15:56)  HR: 99 (10 Apr 2024 07:47) (99 - 125)  BP: 125/55 (10 Apr 2024 07:47) (124/59 - 125/55)  BP(mean): --  RR: 18 (10 Apr 2024 07:47) (18 - 20)  SpO2: --       Vital Signs Last 24 Hrs  T(C): 35.6 (04-10-24 @ 07:47), Max: 36.6 (04-09-24 @ 15:56)  T(F): 96.1 (04-10-24 @ 07:47), Max: 97.8 (04-09-24 @ 15:56)  HR: 99 (04-10-24 @ 07:47) (99 - 100)  BP: 125/55 (04-10-24 @ 07:47) (124/59 - 125/55)  BP(mean): --  RR: 18 (04-10-24 @ 07:47) (18 - 20)  SpO2: --

## 2024-04-10 NOTE — BH INPATIENT PSYCHIATRY PROGRESS NOTE - NSBHASSESSSUMMFT_PSY_ALL_CORE
Ania Mei is a 38 year old  female, domiciled at [facility], past medical history of T1D, diabetic neuropathy, HTN, chronic back pain, migraine headaches, pseudoseizures, and with past psychiatric history of depression/anxiety and cluster B personality disorder, 8 previous psychiatric admissions (most recently discharged from [facility] on 6/13/23), multiple previous suicide attempts (remote hx of once by taking a capful of acetone, another by drinking hydrogen peroxide, another attempt by taking half a bottle of ibuprofen; most recent SA about a year ago by ingesting Pine-Sol, no known medical consequences), no legal or violence hx, denies substance use, BIBEMS activated by residence facility after patient expressed suicidal ideation. On assessment in the ED, patient presented with worsening depressive symptoms x2 months, passive suicidal ideation, unable to engage in safety planning, with reported adherence to medication management but with recent disengagement from outpatient care. She denied any AVH, did not appear acutely manic or psychotic.    On assessment on the inpatient unit, patient had linear thought process, normal speech and depressed but full affect. Patient presents with depressed mood, anhedonia, low energy, changed appetite, and suicidal ideation for 2 months, meeting criteria for major depressive episode. Patient denied any history of alvina. Given patient's numerous suicide attempts in the past, and current suicidality, in the context of cluster B personality disorder, patient would benefit from continued voluntary inpatient psychiatric care for safety planning and medication management.     During inpatient admission, patient continued to display linear thought process, depressed affect, and low energy. Given patient's PMHx of Type 1 DM and fingerstick values, endocrine was consulted and medical adjustments to regimen were made per their recommendations. Throughout her admission, she had multiple episodes of being uncooperative and nonverbal in which she maintained a passive and unresponsive demeanor throughout the interaction (despite attempts to elicit a response). Patient complained of adverse effect of Abilify including headache, nausea, vomiting, dizziness, etc. Patient continues to reports low mood, though suicidal ideation has resolved. Due to patient's severity of depressive symptoms, history of suicide attempts and impulsivity, continued hospitalization is required for medication management and safety.     4/5: Patient continues to report dizziness and now reporting body aches. However, she reports improved mood. No reports of patient being intentionally unresponsive to staff today or yesterday.     4/8: Patient did not cooperate with interview today or with nursing staff morning medication rounds. Patient was observed to be moving when she is not the focus, and then stops responding when spoken to or looked at.    4/9: Patient did not cooperate with interview today or with nursing staff morning medication rounds. Spoke to patients mother for updates and to gather additional collateral when she visited the unit in person.    Plan:  - Lexapro 10mg PO qHS - Started on admission as home medication, currently tapering off - discontinued as of 04/10/24  - c/w Prozac 10mg PO daily - started 4/4/24  - C/W Amitriptyline 75mg PO daily - home med  - Abilify 2mg PO QD - discontinued 4/4 due to reported headache, nausea, vomiting, dizziness  - Discontinued Lamictal 50mg PO QHS - 4/02  - PRNs for agitation: Ativan 2mg + Benadryl 50mg PO/IM q6h  - C/W home medical medications    #Type 1 DM  Per endocrine recs:  - Increase Lantus from 20 units QD to 20 units BID - 4/02  - Start Lispro 10 units TID before meals - 4/02  - Continue current insulin sliding scale   Ania Mei is a 38 year old  female, domiciled at [facility], past medical history of T1D, diabetic neuropathy, HTN, chronic back pain, migraine headaches, pseudoseizures, and with past psychiatric history of depression/anxiety and cluster B personality disorder, 8 previous psychiatric admissions (most recently discharged from [facility] on 6/13/23), multiple previous suicide attempts (remote hx of once by taking a capful of acetone, another by drinking hydrogen peroxide, another attempt by taking half a bottle of ibuprofen; most recent SA about a year ago by ingesting Pine-Sol, no known medical consequences), no legal or violence hx, denies substance use, BIBEMS activated by residence facility after patient expressed suicidal ideation. On assessment in the ED, patient presented with worsening depressive symptoms x2 months, passive suicidal ideation, unable to engage in safety planning, with reported adherence to medication management but with recent disengagement from outpatient care. She denied any AVH, did not appear acutely manic or psychotic.    On assessment on the inpatient unit, patient had linear thought process, normal speech and depressed but full affect. Patient presents with depressed mood, anhedonia, low energy, changed appetite, and suicidal ideation for 2 months, meeting criteria for major depressive episode. Patient denied any history of alvina. Given patient's numerous suicide attempts in the past, and current suicidality, in the context of cluster B personality disorder, patient would benefit from continued voluntary inpatient psychiatric care for safety planning and medication management.     During inpatient admission, patient continued to display linear thought process, depressed affect, and low energy. Given patient's PMHx of Type 1 DM and fingerstick values, endocrine was consulted and medical adjustments to regimen were made per their recommendations. Throughout her admission, she had multiple episodes of being uncooperative and nonverbal in which she maintained a passive and unresponsive demeanor throughout the interaction (despite attempts to elicit a response). Patient complained of adverse effect of Abilify including headache, nausea, vomiting, dizziness, etc. Patient continues to reports low mood, though suicidal ideation has resolved. Due to patient's severity of depressive symptoms, history of suicide attempts and impulsivity, continued hospitalization is required for medication management and safety.     4/5: Patient continues to report dizziness and now reporting body aches. However, she reports improved mood. No reports of patient being intentionally unresponsive to staff today or yesterday.     4/8: Patient did not cooperate with interview today or with nursing staff morning medication rounds. Patient was observed to be moving when she is not the focus, and then stops responding when spoken to or looked at.    4/9: Patient did not cooperate with interview today or with nursing staff morning medication rounds. Spoke to patients mother for updates and to gather additional collateral when she visited the unit in person.    Plan:  - Lexapro 10mg PO qHS - Started on admission as home medication, currently tapering off - discontinued as of 04/10/24  - increase Prozac from 10mg to 20mg PO daily - started 4/4/24, increased 4/10/24  - C/W Amitriptyline 75mg PO daily - home med  - Abilify 2mg PO QD - discontinued 4/4 due to reported headache, nausea, vomiting, dizziness  - Discontinued Lamictal 50mg PO QHS - 4/02  - PRNs for agitation: Ativan 2mg + Benadryl 50mg PO/IM q6h  - C/W home medical medications    #Type 1 DM  - reconsult endocrinology for additional hyperglycemia recommendations 4/10/24  - Initially consulted 4/02/24  Per endocrine recs:  - Increase Lantus from 20 units QD to 20 units BID - 4/02  - Start Lispro 10 units TID before meals - 4/02  - Continue current insulin sliding scale   Ania Mei is a 38 year old  female, domiciled at [facility], past medical history of T1D, diabetic neuropathy, HTN, chronic back pain, migraine headaches, pseudoseizures, and with past psychiatric history of depression/anxiety and cluster B personality disorder, 8 previous psychiatric admissions (most recently discharged from [facility] on 6/13/23), multiple previous suicide attempts (remote hx of once by taking a capful of acetone, another by drinking hydrogen peroxide, another attempt by taking half a bottle of ibuprofen; most recent SA about a year ago by ingesting Pine-Sol, no known medical consequences), no legal or violence hx, denies substance use, BIBEMS activated by residence facility after patient expressed suicidal ideation. On assessment in the ED, patient presented with worsening depressive symptoms x2 months, passive suicidal ideation, unable to engage in safety planning, with reported adherence to medication management but with recent disengagement from outpatient care. She denied any AVH, did not appear acutely manic or psychotic.    On assessment on the inpatient unit, patient had linear thought process, normal speech and depressed but full affect. Patient presents with depressed mood, anhedonia, low energy, changed appetite, and suicidal ideation for 2 months, meeting criteria for major depressive episode. Patient denied any history of alvina. Given patient's numerous suicide attempts in the past, and current suicidality, in the context of cluster B personality disorder, patient would benefit from continued voluntary inpatient psychiatric care for safety planning and medication management.     During inpatient admission, patient continued to display linear thought process, depressed affect, and low energy. Given patient's PMHx of Type 1 DM and fingerstick values, endocrine was consulted and medical adjustments to regimen were made per their recommendations. Throughout her admission, she had multiple episodes of being uncooperative and nonverbal in which she maintained a passive and unresponsive demeanor throughout the interaction (despite attempts to elicit a response). Patient complained of adverse effect of Abilify including headache, nausea, vomiting, dizziness, etc. Patient continues to reports low mood, though suicidal ideation has resolved. Due to patient's severity of depressive symptoms, history of suicide attempts and impulsivity, continued hospitalization is required for medication management and safety.         4/9: Patient did not cooperate with interview today or with nursing staff morning medication rounds. Spoke to patients mother for updates and to gather additional collateral when she visited the unit in person.        Plan:  - Lexapro 10mg PO qHS - Started on admission as home medication, currently tapering off - discontinued as of 04/10/24  - increase Prozac from 10mg to 20mg PO daily - started 4/4/24, increased 4/10/24  - C/W Amitriptyline 75mg PO daily - home med  - Abilify 2mg PO QD - discontinued 4/4 due to reported headache, nausea, vomiting, dizziness  - Discontinued Lamictal 50mg PO QHS - 4/02  - PRNs for agitation: Ativan 2mg + Benadryl 50mg PO/IM q6h  - C/W home medical medications    #Type 1 DM  - reconsult endocrinology for additional hyperglycemia recommendations 4/10/24  - Initially consulted 4/02/24  Per endocrine recs:  - Increase Lantus from 20 units QD to 20 units BID - 4/02  - Start Lispro 10 units TID before meals - 4/02  - Continue current insulin sliding scale

## 2024-04-10 NOTE — BH INPATIENT PSYCHIATRY PROGRESS NOTE - NSBHFUPINTERVALHXFT_PSY_A_CORE
Per nursing staff, patient refused medication this morning and did not respond to verbal or tactile stimulation.     On approach, patient was seen to turn over in bed, but when spoke to patient did not respond verbally. Discussed with patient that we had spoken to her mother yesterday and believe that she wants help and discussed partial hospitalization vs IOP as a potential continuation of treatment.    Spoke to patient's mother this afternoon who stated patient has been telling her that the medication "makes her feel funny" and that she has been "struggling with back pain", two things that have "prevented" her from speaking to the team. Patient required no PRNs since the last assessment. Patient tool all medications this morning.    On approach, patient was lying in bed and at first was not responding; but after talking to her about speaking to her mother yesterday, patient rolled over and began to speak to the team. Patient states that "my back hurts and its making me sad" stating that she has been in "so much pain" since she hurt her back in the ambulance while on the gurney. Patient states the back pain has caused her to "cry all day" and "not want to talk". Discussed with patient medications and exercises that may help with her back pain and patient agreed to try. Also discussed talking to the endocrinologist for additional recommendation is controlling her hyperglycemia. Patient is not endorsing SI/AVH at this time. Patient is reporting no side effects from medications. Discussed increasing prozac and that it may take time to reach full effect, patient agreed. Discussed continuation of care in the outpatient setting after return to group home after ensuring tolerance to medication and patient agreed.

## 2024-04-11 LAB
GLUCOSE BLDC GLUCOMTR-MCNC: 192 MG/DL — HIGH (ref 70–99)
GLUCOSE BLDC GLUCOMTR-MCNC: 192 MG/DL — HIGH (ref 70–99)
GLUCOSE BLDC GLUCOMTR-MCNC: 194 MG/DL — HIGH (ref 70–99)
GLUCOSE BLDC GLUCOMTR-MCNC: 233 MG/DL — HIGH (ref 70–99)

## 2024-04-11 RX ORDER — AMITRIPTYLINE HCL 25 MG
1 TABLET ORAL
Qty: 14 | Refills: 0
Start: 2024-04-11 | End: 2024-04-24

## 2024-04-11 RX ORDER — FLUOXETINE HCL 10 MG
1 CAPSULE ORAL
Qty: 14 | Refills: 0
Start: 2024-04-11 | End: 2024-04-24

## 2024-04-11 RX ADMIN — Medication 800 MILLIGRAM(S): at 11:11

## 2024-04-11 RX ADMIN — INSULIN GLARGINE 24 UNIT(S): 100 INJECTION, SOLUTION SUBCUTANEOUS at 20:32

## 2024-04-11 RX ADMIN — Medication 14 UNIT(S): at 16:28

## 2024-04-11 RX ADMIN — Medication 14 UNIT(S): at 11:42

## 2024-04-11 RX ADMIN — Medication 75 MILLIGRAM(S): at 08:52

## 2024-04-11 RX ADMIN — Medication 1: at 16:28

## 2024-04-11 RX ADMIN — Medication 14 UNIT(S): at 07:33

## 2024-04-11 RX ADMIN — Medication 2: at 07:31

## 2024-04-11 RX ADMIN — Medication 25 MILLIGRAM(S): at 20:31

## 2024-04-11 RX ADMIN — Medication 25 MILLIGRAM(S): at 12:46

## 2024-04-11 RX ADMIN — INSULIN GLARGINE 24 UNIT(S): 100 INJECTION, SOLUTION SUBCUTANEOUS at 10:26

## 2024-04-11 RX ADMIN — Medication 25 MILLIGRAM(S): at 08:52

## 2024-04-11 RX ADMIN — PANTOPRAZOLE SODIUM 40 MILLIGRAM(S): 20 TABLET, DELAYED RELEASE ORAL at 08:51

## 2024-04-11 RX ADMIN — Medication 20 MILLIGRAM(S): at 08:51

## 2024-04-11 RX ADMIN — Medication 1: at 11:42

## 2024-04-11 NOTE — BH INPATIENT PSYCHIATRY PROGRESS NOTE - NSBHFUPINTERVALCCFT_PSY_A_CORE
"I'm still shaky."
"my back hurts and its making me sad"
"Still tired"
patient nonverbal
patient was non verbal
"I am in bed all the time."
patient was non verbal
"my back hurts and its making me sad"

## 2024-04-11 NOTE — BH INPATIENT PSYCHIATRY PROGRESS NOTE - PRN MEDS
MEDICATIONS  (PRN):  acetaminophen     Tablet .. 650 milliGRAM(s) Oral every 6 hours PRN Temp greater or equal to 38C (100.4F), Mild Pain (1 - 3)  albuterol    90 MICROgram(s) HFA Inhaler 2 Puff(s) Inhalation every 6 hours PRN Shortness of Breath and/or Wheezing  benzonatate 100 milliGRAM(s) Oral three times a day PRN Cough  dextrose Oral Gel 15 Gram(s) Oral once PRN Blood Glucose LESS THAN 70 milliGRAM(s)/deciliter  diphenhydrAMINE 50 milliGRAM(s) Oral every 6 hours PRN Combative behavior  ibuprofen  Tablet. 800 milliGRAM(s) Oral every 6 hours PRN Moderate Pain (4 - 6), Severe Pain (7 - 10)  lidocaine 2% Gel 1 Application(s) Topical every 6 hours PRN anal fissure pain  ondansetron    Tablet 4 milliGRAM(s) Oral every 8 hours PRN Nausea and/or Vomiting

## 2024-04-11 NOTE — BH INPATIENT PSYCHIATRY PROGRESS NOTE - NSBHPROGMEDSE_PSY_A_CORE FT
Patient states medication 
Patient states medication 
Abilify - headache, somnolence, nausea, vomiting, dizziness

## 2024-04-11 NOTE — BH INPATIENT PSYCHIATRY PROGRESS NOTE - CURRENT MEDICATION
MEDICATIONS  (STANDING):  amitriptyline 75 milliGRAM(s) Oral daily  dextrose 5%. 1000 milliLiter(s) (100 mL/Hr) IV Continuous <Continuous>  dextrose 5%. 1000 milliLiter(s) (50 mL/Hr) IV Continuous <Continuous>  dextrose 50% Injectable 25 Gram(s) IV Push once  dextrose 50% Injectable 12.5 Gram(s) IV Push once  dextrose 50% Injectable 25 Gram(s) IV Push once  FLUoxetine 20 milliGRAM(s) Oral daily  glucagon  Injectable 1 milliGRAM(s) IntraMuscular once  insulin glargine Injectable (LANTUS) 24 Unit(s) SubCutaneous two times a day  insulin lispro (ADMELOG) corrective regimen sliding scale   SubCutaneous three times a day before meals  insulin lispro Injectable (ADMELOG) 14 Unit(s) SubCutaneous three times a day before meals  metoprolol tartrate 25 milliGRAM(s) Oral three times a day  pantoprazole    Tablet 40 milliGRAM(s) Oral before breakfast  senna 2 Tablet(s) Oral at bedtime    MEDICATIONS  (PRN):  acetaminophen     Tablet .. 650 milliGRAM(s) Oral every 6 hours PRN Temp greater or equal to 38C (100.4F), Mild Pain (1 - 3)  albuterol    90 MICROgram(s) HFA Inhaler 2 Puff(s) Inhalation every 6 hours PRN Shortness of Breath and/or Wheezing  benzonatate 100 milliGRAM(s) Oral three times a day PRN Cough  dextrose Oral Gel 15 Gram(s) Oral once PRN Blood Glucose LESS THAN 70 milliGRAM(s)/deciliter  diphenhydrAMINE 50 milliGRAM(s) Oral every 6 hours PRN Combative behavior  ibuprofen  Tablet. 800 milliGRAM(s) Oral every 6 hours PRN Moderate Pain (4 - 6), Severe Pain (7 - 10)  lidocaine 2% Gel 1 Application(s) Topical every 6 hours PRN anal fissure pain  ondansetron    Tablet 4 milliGRAM(s) Oral every 8 hours PRN Nausea and/or Vomiting

## 2024-04-11 NOTE — BH INPATIENT PSYCHIATRY DISCHARGE NOTE - HPI (INCLUDE ILLNESS QUALITY, SEVERITY, DURATION, TIMING, CONTEXT, MODIFYING FACTORS, ASSOCIATED SIGNS AND SYMPTOMS)
Ania Mei is a 38 year old  female, domiciled at [facility] Living with mother, no dependents, unemployed, past medical history of T1D, diabetic neuropathy, HTN, chronic back pain, migraine headaches, pseudoseizures, with past psychiatric history of depression/anxiety and cluster B personality disorder, 8 previous psychiatric admissions (most recently discharged from [facility] on 23), multiple previous suicide attempts (remote hx of once by taking a capful of acetone, another by drinking hydrogen peroxide, another attempt by taking half a bottle of ibuprofen; most recent SA about a year ago by ingesting Pine-Sol, no known medical consequences), no legal or violence hx, denies substance use, BIBEMS activated by residence facility after patient expressed suicidal ideation.  Upon approach, patient is lying in bed comfortably. Patient is alert and oriented and engages with interviewer. Patient is cooperative and answers all questions appropriately. She reports that she started feeling depressed 2 months ago when her friend told her she should slit her wrist and that she hopes she chokes. Patient reports feeling hurt that she would say that when she already knows she is suicidal. Patient reports she tried managing the depression but was constantly sick with COVID, etc, and was unable to see her therapist. She reports that she told her mother that she wants to be with her  father which led to her calling EMS. She reports no desire to engage in activities she once found to be entertaining such as shopping. She reports low energy and appetite that "comes and goes" though she usually overeats when not feeling depressed. She confirms current suicidal ideation. Patient denies any persecutory delusions and denies any auditory or visual hallucinations. She denies any history of alvina. She reports that her goal here is to adjust medications to help with mood.

## 2024-04-11 NOTE — BH INPATIENT PSYCHIATRY PROGRESS NOTE - NSTXDIABGOAL_PSY_ALL_CORE
Will be able to able to describe prescribed diabetic medications and how to properly take these medications

## 2024-04-11 NOTE — BH INPATIENT PSYCHIATRY DISCHARGE NOTE - REASON FOR ADMISSION
You were admitted to the inpatient psychiatry unit because you were isolating, had depressed mood, and expressed suicidal ideation with no plan.

## 2024-04-11 NOTE — BH INPATIENT PSYCHIATRY PROGRESS NOTE - NSICDXBHSECONDARYDX_PSY_ALL_CORE
Cluster B personality disorder in adult   F60.9  

## 2024-04-11 NOTE — BH INPATIENT PSYCHIATRY DISCHARGE NOTE - OTHER PAST PSYCHIATRIC HISTORY (INCLUDE DETAILS REGARDING ONSET, COURSE OF ILLNESS, INPATIENT/OUTPATIENT TREATMENT)
Ania has a past psychiatric history of depression/ anxiety and cluster B personality disorder, 8 previous psychiatric admissions (most recently discharged from [facility] June 2023), multiple previous suicide attempts (most recent a year ago by ingesting pine sol) and a history of pseudoseizures.

## 2024-04-11 NOTE — BH INPATIENT PSYCHIATRY PROGRESS NOTE - NSBHMETABOLIC_PSY_ALL_CORE_FT
BMI: BMI (kg/m2): 32.9 (03-31-24 @ 18:14)  HbA1c: A1C with Estimated Average Glucose Result: 12.1 % (04-01-24 @ 07:30)    Glucose: POCT Blood Glucose.: 192 mg/dL (04-11-24 @ 11:26)    BP: 107/76 (04-11-24 @ 12:45) (107/76 - 162/72)Vital Signs Last 24 Hrs  T(C): 35.7 (04-11-24 @ 08:00), Max: 35.9 (04-10-24 @ 15:49)  T(F): 96.2 (04-11-24 @ 08:00), Max: 96.7 (04-10-24 @ 15:49)  HR: 97 (04-11-24 @ 12:45) (96 - 100)  BP: 107/76 (04-11-24 @ 12:45) (107/76 - 162/72)  BP(mean): --  RR: 16 (04-11-24 @ 08:00) (16 - 17)  SpO2: --      Lipid Panel:

## 2024-04-11 NOTE — BH INPATIENT PSYCHIATRY PROGRESS NOTE - NSBHATTESTTYPEVISIT_PSY_A_CORE
Attending with Resident/Fellow/Student

## 2024-04-11 NOTE — BH INPATIENT PSYCHIATRY DISCHARGE NOTE - HOSPITAL COURSE
During inpatient admission, patient continued to display linear thought process, depressed affect, and low energy. Given patient's PMHx of Type 1 DM and fingerstick values, endocrine was consulted and medical adjustments to regimen were made per their recommendations. Throughout her admission, she had multiple episodes of being uncooperative and nonverbal in which she maintained a passive and unresponsive demeanor throughout the interaction (despite attempts to elicit a response). Patient complained of adverse effect of Abilify including headache, nausea, vomiting, dizziness, etc. Patient continues to reports low mood, though suicidal ideation has resolved. Due to patient's severity of depressive symptoms, history of suicide attempts and impulsivity, continued hospitalization is required for medication management and safety.     On continued assessment, patient has shown improvement in mood, suicidality (no longer presents), behavior, insight, and judgment regarding psychiatric illness. Patient is responding well to medication and is willing and able to safely continue treatment in the outpatient setting. On initial assessment on the inpatient unit, patient had linear thought process, normal speech and depressed but full affect. Patient presents with depressed mood, anhedonia, low energy, changed appetite, and suicidal ideation for 2 months, meeting criteria for major depressive episode. Patient denied any history of alvina. Given patient's numerous suicide attempts in the past, and current suicidality, in the context of cluster B personality disorder, patient would benefit from continued voluntary inpatient psychiatric care for safety planning and medication management.     During inpatient admission, patient continued to display linear thought process, depressed affect, and low energy. Given patient's PMHx of Type 1 DM and fingerstick values, endocrine was consulted and medical adjustments to regimen were made per their recommendations. Throughout her admission, she had multiple episodes of being uncooperative and nonverbal in which she maintained a passive and unresponsive demeanor throughout the interaction (despite attempts to elicit a response). Patient complained of adverse effect of Abilify including headache, nausea, vomiting, dizziness, etc. Patient continues to reports low mood, though suicidal ideation has resolved. Due to patient's severity of depressive symptoms, history of suicide attempts and impulsivity, continued hospitalization is required for medication management and safety.     On continued assessment, patient has shown improvement in mood, suicidality (no longer presents), behavior, insight, and judgment regarding psychiatric illness. Patient is responding well to medication and is willing and able to safely continue treatment in the outpatient setting.

## 2024-04-11 NOTE — BH INPATIENT PSYCHIATRY PROGRESS NOTE - NSBHMSEMUSCLE_PSY_A_CORE
Normal muscle tone/strength
Unable to assess
Normal muscle tone/strength
Unable to assess

## 2024-04-11 NOTE — BH INPATIENT PSYCHIATRY PROGRESS NOTE - NSBHCHARTREVIEWVS_PSY_A_CORE FT
Vital Signs Last 24 Hrs  T(C): 35.7 (04-11-24 @ 08:00), Max: 35.9 (04-10-24 @ 15:49)  T(F): 96.2 (04-11-24 @ 08:00), Max: 96.7 (04-10-24 @ 15:49)  HR: 97 (04-11-24 @ 12:45) (96 - 100)  BP: 107/76 (04-11-24 @ 12:45) (107/76 - 162/72)  BP(mean): --  RR: 16 (04-11-24 @ 08:00) (16 - 17)  SpO2: --

## 2024-04-11 NOTE — BH INPATIENT PSYCHIATRY DISCHARGE NOTE - NSBHASSESSSUMMFT_PSY_ALL_CORE
Ania Mei is a 38 year old  female, domiciled at [facility], past medical history of T1D, diabetic neuropathy, HTN, chronic back pain, migraine headaches, pseudoseizures, and with past psychiatric history of depression/anxiety and cluster B personality disorder, 8 previous psychiatric admissions (most recently discharged from [facility] on 6/13/23), multiple previous suicide attempts (remote hx of once by taking a capful of acetone, another by drinking hydrogen peroxide, another attempt by taking half a bottle of ibuprofen; most recent SA about a year ago by ingesting Pine-Sol, no known medical consequences), no legal or violence hx, denies substance use, BIBEMS activated by residence facility after patient expressed suicidal ideation. On assessment in the ED, patient presented with worsening depressive symptoms x2 months, passive suicidal ideation, unable to engage in safety planning, with reported adherence to medication management but with recent disengagement from outpatient care. She denied any AVH, did not appear acutely manic or psychotic.    On assessment on the inpatient unit, patient had linear thought process, normal speech and depressed but full affect. Patient presents with depressed mood, anhedonia, low energy, changed appetite, and suicidal ideation for 2 months, meeting criteria for major depressive episode. Patient denied any history of alvina. Given patient's numerous suicide attempts in the past, and current suicidality, in the context of cluster B personality disorder, patient would benefit from continued voluntary inpatient psychiatric care for safety planning and medication management.     During inpatient admission, patient continued to display linear thought process, depressed affect, and low energy. Given patient's PMHx of Type 1 DM and fingerstick values, endocrine was consulted and medical adjustments to regimen were made per their recommendations. Throughout her admission, she had multiple episodes of being uncooperative and nonverbal in which she maintained a passive and unresponsive demeanor throughout the interaction (despite attempts to elicit a response). Patient complained of adverse effect of Abilify including headache, nausea, vomiting, dizziness, etc. Patient continues to reports low mood, though suicidal ideation has resolved. Due to patient's severity of depressive symptoms, history of suicide attempts and impulsivity, continued hospitalization is required for medication management and safety.     On continued assessment, patient has shown improvement in mood, suicidality (no longer presents), behavior, insight, and judgment regarding psychiatric illness. Patient is responding well to medication and is willing and able to safely continue treatment in the outpatient setting.    Plan:  - Lexapro 10mg PO qHS - Started on admission as home medication, currently tapering off - discontinued as of 04/10/24  - increase Prozac from 10mg to 20mg PO daily - started 4/4/24, increased 4/10/24  - C/W Amitriptyline 75mg PO daily - home med  - Abilify 2mg PO QD - discontinued 4/4 due to reported headache, nausea, vomiting, dizziness  - Discontinued Lamictal 50mg PO QHS - 4/02  - PRNs for agitation: Ativan 2mg + Benadryl 50mg PO/IM q6h  - C/W home medical medications    #Type 1 DM  - reconsult endocrinology for additional hyperglycemia recommendations 4/10/24, Initially consulted 4/02/24  Per endocrine recs:  lantus increased to 24 units BID   lispro increased  to 14 TIDAC  - Continue current insulin sliding scale   Ania Mei is a 38 year old  female, domiciled at [facility], past medical history of T1D, diabetic neuropathy, HTN, chronic back pain, migraine headaches, pseudoseizures, and with past psychiatric history of depression/anxiety and cluster B personality disorder, 8 previous psychiatric admissions (most recently discharged from [facility] on 6/13/23), multiple previous suicide attempts (remote hx of once by taking a capful of acetone, another by drinking hydrogen peroxide, another attempt by taking half a bottle of ibuprofen; most recent SA about a year ago by ingesting Pine-Sol, no known medical consequences), no legal or violence hx, denies substance use, BIBEMS activated by residence facility after patient expressed suicidal ideation. On assessment in the ED, patient presented with worsening depressive symptoms x2 months, passive suicidal ideation, unable to engage in safety planning, with reported adherence to medication management but with recent disengagement from outpatient care. She denied any AVH, did not appear acutely manic or psychotic.    On assessment on the inpatient unit, patient had linear thought process, normal speech and depressed but full affect. Patient presents with depressed mood, anhedonia, low energy, changed appetite, and suicidal ideation for 2 months, meeting criteria for major depressive episode. Patient denied any history of alvina. Given patient's numerous suicide attempts in the past, and current suicidality, in the context of cluster B personality disorder, patient would benefit from continued voluntary inpatient psychiatric care for safety planning and medication management.     During inpatient admission, patient continued to display linear thought process, depressed affect, and low energy. Given patient's PMHx of Type 1 DM and fingerstick values, endocrine was consulted and medical adjustments to regimen were made per their recommendations. Throughout her admission, she had multiple episodes of being uncooperative and nonverbal in which she maintained a passive and unresponsive demeanor throughout the interaction (despite attempts to elicit a response). Patient complained of adverse effect of Abilify including headache, nausea, vomiting, dizziness, etc. Patient continues to reports low mood, though suicidal ideation has resolved. Due to patient's severity of depressive symptoms, history of suicide attempts and impulsivity, continued hospitalization is required for medication management and safety.     On continued assessment, patient has shown improvement in mood, suicidality (no longer presents), behavior, insight, and judgment regarding psychiatric illness. Patient is responding well to medication and is willing and able to safely continue treatment in the outpatient setting.    Plan:  - Lexapro 10mg PO qHS - Started on admission as home medication, currently tapering off - discontinued as of 04/10/24  - increase Prozac from 10mg to 20mg PO daily - started 4/4/24, increased 4/10/24  - C/W Amitriptyline 75mg PO daily - home med  - Abilify 2mg PO QD - discontinued 4/4 due to reported headache, nausea, vomiting, dizziness  - Discontinued Lamictal 50mg PO QHS - 4/02  - PRNs for agitation: Ativan 2mg + Benadryl 50mg PO/IM q6h  - C/W home medical medications   Ania Mei is a 38 year old  female, domiciled at [facility], past medical history of T1D, diabetic neuropathy, HTN, chronic back pain, migraine headaches, pseudoseizures, and with past psychiatric history of depression/anxiety and cluster B personality disorder, 8 previous psychiatric admissions (most recently discharged from [facility] on 6/13/23), multiple previous suicide attempts (remote hx of once by taking a capful of acetone, another by drinking hydrogen peroxide, another attempt by taking half a bottle of ibuprofen; most recent SA about a year ago by ingesting Pine-Sol, no known medical consequences), no legal or violence hx, denies substance use, BIBEMS activated by residence facility after patient expressed suicidal ideation. On assessment in the ED, patient presented with worsening depressive symptoms x2 months, passive suicidal ideation, unable to engage in safety planning, with reported adherence to medication management but with recent disengagement from outpatient care. She denied any AVH, did not appear acutely manic or psychotic.    On initial assessment on the inpatient unit, patient had linear thought process, normal speech and depressed but full affect. Patient presents with depressed mood, anhedonia, low energy, changed appetite, and suicidal ideation for 2 months, meeting criteria for major depressive episode. Patient denied any history of alvina. Given patient's numerous suicide attempts in the past, and current suicidality, in the context of cluster B personality disorder, patient would benefit from continued voluntary inpatient psychiatric care for safety planning and medication management.     During inpatient admission, patient continued to display linear thought process, depressed affect, and low energy. Given patient's PMHx of Type 1 DM and fingerstick values, endocrine was consulted and medical adjustments to regimen were made per their recommendations. Throughout her admission, she had multiple episodes of being uncooperative and nonverbal in which she maintained a passive and unresponsive demeanor throughout the interaction (despite attempts to elicit a response). Patient complained of adverse effect of Abilify including headache, nausea, vomiting, dizziness, etc. Patient continues to reports low mood, though suicidal ideation has resolved. Due to patient's severity of depressive symptoms, history of suicide attempts and impulsivity, continued hospitalization is required for medication management and safety.     On continued assessment, patient has shown improvement in mood, suicidality (no longer presents), behavior, insight, and judgment regarding psychiatric illness. Patient is responding well to medication and is willing and able to safely continue treatment in the outpatient setting.    Plan:  - Lexapro 10mg PO qHS - Started on admission as home medication, currently tapering off - discontinued as of 04/10/24  - increase Prozac from 10mg to 20mg PO daily - started 4/4/24, increased 4/10/24  - C/W Amitriptyline 75mg PO daily - home med  - Abilify 2mg PO QD - discontinued 4/4 due to reported headache, nausea, vomiting, dizziness  - Discontinued Lamictal 50mg PO QHS - 4/02  - PRNs for agitation: Ativan 2mg + Benadryl 50mg PO/IM q6h  - C/W home medical medications

## 2024-04-11 NOTE — BH INPATIENT PSYCHIATRY PROGRESS NOTE - NSBHCONSBHPROVDETAILS_PSY_A_CORE  FT
Dr. Curiel contacted on 4/02

## 2024-04-11 NOTE — BH INPATIENT PSYCHIATRY PROGRESS NOTE - NSBHATTESTBILLING_PSY_A_CORE
56650-Xljuuzulpz OBS or IP - low complexity OR 25-34 mins
68358-Ncmkwrsevp OBS or IP - low complexity OR 25-34 mins
[id number]-Bstfrzvycz OBS or IP - moderate complexity OR 35-49 mins
19993-Xvwwegxrav OBS or IP - low complexity OR 25-34 mins
07977-Dekkrpjfwu OBS or IP - low complexity OR 25-34 mins
[id number]-Fbbwglpzpx OBS or IP - moderate complexity OR 35-49 mins
52808-Tuiijimwsv OBS or IP - low complexity OR 25-34 mins
51271-Ahufefxqcm OBS or IP - moderate complexity OR 35-49 mins

## 2024-04-11 NOTE — BH INPATIENT PSYCHIATRY DISCHARGE NOTE - NSBHMSERELATED_PSY_A_CORE
Your evaluated in the emergency department for flank pain, after evaluation we believe this flank pain is due to your kidney stones.  Please take the provided medication as prescribed.  Please follow-up with the provided urologist.  Please return the emergency department you have any uncontrollable pain fevers or pain with urination.   Poor Fair

## 2024-04-11 NOTE — BH INPATIENT PSYCHIATRY PROGRESS NOTE - NSTXPROBDIAB_PSY_ALL_CORE
DIABETES MANAGEMENT

## 2024-04-11 NOTE — BH INPATIENT PSYCHIATRY PROGRESS NOTE - NSTXSUICIDINTERMD_PSY_ALL_CORE
Medication management, aftercare planning

## 2024-04-11 NOTE — BH INPATIENT PSYCHIATRY PROGRESS NOTE - NSBHFUPINTERVALHXFT_PSY_A_CORE
Patient required no PRNs since the last assessment. Patient took all medications this morning.    On approach, patient was lying in bed and at first was not responding; but after talking to her about speaking to her mother yesterday, patient rolled over and began to speak to the team. Patient states that "my back hurts and its making me sad" stating that she has been in "so much pain" since she hurt her back in the ambulance while on the gurney. Patient states the back pain has caused her to "cry all day" and "not want to talk". Discussed with patient medications and exercises that may help with her back pain and patient agreed to try. Also discussed talking to the endocrinologist for additional recommendation is controlling her hyperglycemia. Patient is not endorsing SI/AVH at this time. Patient is reporting no side effects from medications. Discussed increasing prozac and that it may take time to reach full effect, patient agreed. Discussed continuation of care in the outpatient setting after return to group home after ensuring tolerance to medication and patient agreed.

## 2024-04-11 NOTE — BH INPATIENT PSYCHIATRY DISCHARGE NOTE - NSBHFUPINTERVALHXFT_PSY_A_CORE
Patient required no PRNs since the last assessment. Patient took all medications this morning.    On approach, patient was lying in bed and at first was not responding; but after talking to her about speaking to her mother yesterday, patient rolled over and began to speak to the team. Patient states that "my back hurts and its making me sad" stating that she has been in "so much pain" since she hurt her back in the ambulance while on the gurney. Patient states the back pain has caused her to "cry all day" and "not want to talk". Discussed with patient medications and exercises that may help with her back pain and patient agreed to try. Also discussed talking to the endocrinologist for additional recommendation is controlling her hyperglycemia. Patient is not endorsing SI/AVH at this time. Patient is reporting no side effects from medications. Discussed increasing prozac and that it may take time to reach full effect, patient agreed. Discussed continuation of care in the outpatient setting after return to group home after ensuring tolerance to medication and patient agreed.  Patient required no PRNs since the last assessment. Patient took all medications this morning.    On approach, patient was lying in bed and at first was not responding; but after talking to her about speaking to her mother yesterday, patient rolled over and began to speak to the team. Patient was reminded she is being discharged today and she expressed concern but that she would "follow up" with her appointments. Discussed with patient continuatin medications and she agreed. Discussed with patient the followup for he DM control per endocrinology consult and patient agreed. Patient is endorsing passive SI that is chronic in nature, and there is no plan or intent. Patient is denying AVH at this time. Patient is reporting no side effects from medications. Discussed continuation of care in the outpatient setting after return to group home after ensuring tolerance to medication and patient agreed.

## 2024-04-11 NOTE — BH INPATIENT PSYCHIATRY PROGRESS NOTE - NSBHATTESTTYPESTAFF_PSY_A_CORE
Resident
Resident/Student
Resident

## 2024-04-11 NOTE — BH INPATIENT PSYCHIATRY PROGRESS NOTE - NSDCCRITERIA_PSY_ALL_CORE
when patient feels safe to be discharged and is no longer a danger to self.

## 2024-04-11 NOTE — BH INPATIENT PSYCHIATRY DISCHARGE NOTE - NSBHDCVIOLRISK_PSY_A_CORE
General


Chief Complaint:  Dizziness/Syncope


Stated Complaint:  CONFUSION,PASSED OUT


Nursing Triage Note:  


PT TO ROOM FS2 WITH C/O COUGHING AT WORK AND "BLACKED OUT" AND HIT HIS HEAD AND 


BACK. PT REPORTS VOMITING X3.


Nursing Sepsis Screen:  No Definite Risk


Source of Information:  Patient





History of Present Illness


Date Seen by Provider:  May 28, 2021


Time Seen by Provider:  22:07


Initial Comments


PT ARRIVES VIA POV FROM WORK


STATES HE HAS HAD NAUSEA AND VOMITING FOR THE LAST 3 WEEKS


STATES HE CAN'T EAT OR DRINK ANYTHING, BECAUSE HE VOMITS


STATES HE HAS "BEEN PUKING ALL DAY" 


PT COUGHS, GAGS, THROWS UP


TONIGHT AT WORK, HE WENT TO THE BATHROOM, AND COUGHED AND "BLACKED OUT" AND HIT 

HIS HEAD AND BACK ON UNKNOWN SURFACES


STATES THIS OCCURRED AT APPROXIMATELY 2100, AND HE "CAME TO" IN THE BATHROOM 

AROUND 2130





NO FEVER/SWEATS/CHILLS


NO CHEST PAIN 


NO PALPITATIONS


NO SHORTNESS OF BREATH


NO ABDOMINAL PAIN 


NO DIARRHEA


NO URINARY SYMPTOMS AND VOIDING A NORMAL AMOUNT





C/O HEADACHE FROM HITTING HIS HEAD


NO NECK PAIN 





STATES HE HAD "1 BEER EARLY THIS MORNING" 


STATES HE "VAPED HIS FRIEND'S VAPE" 





PT HAS HAD BOTH COVID-19 VACCINATIONS, LAST ONE 04/29/21





PCP; [facility], NP [place]





Allergies and Home Medications


Allergies


Coded Allergies:  


     Sulfa (Sulfonamide Antibiotics) (Verified  Allergy, Severe, HIVES, 11/4/20)


     levofloxacin (Verified  Allergy, Unknown, 9/2/20)


     doxycycline (Verified  Adverse Reaction, Mild, Vomiting, 9/2/20)





Home Medications


Acetaminophen 500 Mg Tablet, 1,000 MG PO Q8H PRN for PAIN-MILD (1-4), (Reported)


Benzonatate 100 Mg Capsule, 200 MG PO TID


   Prescribed by: RIDDHI VAUGHN on 5/28/21 2350


Guaifenesin/Dextromethorphan 1 Each Tbmp.12hr, 1 EACH PO BID


   Prescribed by: RIDDHI VAUGHN on 5/28/21 2350


Omeprazole 20 Mg Capsule.dr, 20 MG PO BID


   Prescribed by: SOHAM JAMISON on 12/28/20 1427


Ondansetron 4 Mg Tab.rapdis, 4 MG PO Q8H PRN for nausea


   Prescribed by: JESSICA MCGREGOR on 5/25/21 1215


Ondansetron 8 Mg Tab.rapdis, 8 MG PO Q4H PRN for NAUSEA/VOMITING


   Prescribed by: RIDDHI VAUGHN on 5/28/21 2350


Pantoprazole Sodium 40 Mg Tablet.dr, 40 MG PO DAILY


   Prescribed by: RIDDHI VAUGHN on 5/28/21 2350





Patient Home Medication List


Home Medication List Reviewed:  Yes





Review of Systems


Review of Systems


Constitutional:  see HPI; No chills, No diaphoresis; dizziness; No fever; 

malaise, weakness


EENTM:  no symptoms reported


Respiratory:  see HPI, cough; No phlegm, No short of breath


Cardiovascular:  see HPI; No chest pain, No edema, No palpitations; syncope


Gastrointestinal:  see HPI; No abdominal pain, No diarrhea; loss of appetite, 

nausea, vomiting


Genitourinary:  no symptoms reported; No decreased output


Musculoskeletal:  see HPI, back pain; No neck pain


Skin:  no symptoms reported


Psychiatric/Neurological:  See HPI, Headache


Hematologic/Lymphatic:  No Symptoms Reported


Immunological/Allergic:  no symptoms reported





Past Medical-Social-Family Hx


Patient Social History


Alcohol Use:  Occasionally Uses


Number of Drinks Today:  AA


Alcohol Beverage of Choice:  Beer


Drug of Choice:  DENIES, BUT UDS + FOR THC 05/28/21


Smoking Status:  Current Everyday Smoker (1 PPD)


Type Used:  Cigarettes


2nd Hand Smoke Exposure:  Yes


Recent Infectious Disease Expo:  No


Recent Hopitalizations:  No





Immunizations Up To Date


Date of Influenza Vaccine:  Oct 1, 2020





Past Medical History


Surgeries:  Yes (KIDNEY STONES--BASKET REMOVAL, )


Adenoidectomy, Cardiac, Renal, Tonsillectomy, Vasectomy


Respiratory:  No


Cardiac:  No


Neurological:  Yes (STROKE 2019-LEFT SIDE WEAKNESS, MOSTLY RESOLVED)


Stroke


Reproductive Disorders:  No


Genitourinary:  Yes (EPIDIDYMITIS)


Kidney Stones


Gastrointestinal:  No


Musculoskeletal:  Yes


Chronic Back Pain


Endocrine:  Yes (hypoglycemia)


HEENT:  Yes (TONSILLECTOMY)


Tonsilitis


Cancer:  No


Psychosocial:  Yes


Anxiety, PTSD


Integumentary:  No


Blood Disorders:  No





Family Medical History





SOCIAL HISTORY:


-ETOH--OCCASIONAL USE


-DRUGS--DENIES USE


-SMOKES 1 PPD





Physical Exam


Vital Signs





Vital Signs - First Documented








 5/28/21 5/29/21





 22:10 00:24


 


Temp 36.2 


 


Pulse 88 


 


Resp 18 


 


B/P (MAP) 146/110 (122) 


 


Pulse Ox  96


 


O2 Delivery Room Air 





Capillary Refill : Less Than 3 Seconds


Height, Weight, BMI


Height: 6'"


Weight: 242lbs. oz. [phone]; 30.00 BMI


Method:Stated


General Appearance:  No Apparent Distress, WD/WN


HEENT:  PERRL/EOMI, TMs Normal, Normal ENT Inspection, Pharynx Normal, Moist 

Mucous Membranes, Other (TENDERNESS TO BACK OF HEAD, BUT NO EXTERNAL EVIDENCE OF

TRAUMA)


Neck:  Full Range of Motion, Normal Inspection, Non Tender, Supple


Respiratory:  Chest Non Tender, Normal Breath Sounds, No Accessory Muscle Use, 

No Respiratory Distress


Cardiovascular:  Regular Rate, Rhythm, No Edema, No JVD, No Murmur, Normal 

Peripheral Pulses


Gastrointestinal:  Normal Bowel Sounds, No Organomegaly, No Pulsatile Mass, Non 

Tender, Soft


Back:  Normal Inspection, No CVA Tenderness, No Vertebral Tenderness


Extremity:  Normal Capillary Refill, Normal Inspection, Normal Range of Motion, 

Non Tender, No Calf Tenderness, No Pedal Edema


Neurologic/Psychiatric:  Alert, Oriented x3, No Motor/Sensory Deficits, Normal 

Mood/Affect, CNs II-XII Norm as Tested; No Abnormal Cerebellar Tests


Skin:  Normal Color, Warm/Dry; No Rash





Progress/Results/Core Measures


Suspected Sepsis


Recent Fever Within 48 Hours:  No


Infection Criteria Present:  None


New/Unexplained  Altered Menta:  No


Sepsis Screen:  No Definite Risk


SIRS


Temperature: 


Pulse: 88 


Respiratory Rate: 18


 


Laboratory Tests


5/28/21 22:19: White Blood Count 7.3


Blood Pressure 146 /110 


Mean: 122


 


Laboratory Tests


5/28/21 22:19: 


Creatinine 1.05, Platelet Count 283, Total Bilirubin 0.5








Results/Orders


Lab Results





Laboratory Tests








Test


 5/28/21


22:19 5/28/21


22:31 5/28/21


22:49 Range/Units


 


 


White Blood Count


 7.3 


 


 


 4.3-11.0


10^3/uL


 


Red Blood Count


 5.32 


 


 


 4.30-5.52


10^6/uL


 


Hemoglobin 17.0    13.3-17.7  g/dL


 


Hematocrit 50    40-54  %


 


Mean Corpuscular Volume 94    80-99  fL


 


Mean Corpuscular Hemoglobin 32    25-34  pg


 


Mean Corpuscular Hemoglobin


Concent 34 


 


 


 32-36  g/dL





 


Red Cell Distribution Width 12.6    10.0-14.5  %


 


Platelet Count


 283 


 


 


 130-400


10^3/uL


 


Mean Platelet Volume 8.7 L   9.0-12.2  fL


 


Immature Granulocyte % (Auto) 0     %


 


Neutrophils (%) (Auto) 60    42-75  %


 


Lymphocytes (%) (Auto) 28    12-44  %


 


Monocytes (%) (Auto) 7    0-12  %


 


Eosinophils (%) (Auto) 5    0-10  %


 


Basophils (%) (Auto) 1    0-10  %


 


Neutrophils # (Auto)


 4.4 


 


 


 1.8-7.8


10^3/uL


 


Lymphocytes # (Auto)


 2.0 


 


 


 1.0-4.0


10^3/uL


 


Monocytes # (Auto)


 0.5 


 


 


 0.0-1.0


10^3/uL


 


Eosinophils # (Auto)


 0.3 


 


 


 0.0-0.3


10^3/uL


 


Basophils # (Auto)


 0.1 


 


 


 0.0-0.1


10^3/uL


 


Immature Granulocyte # (Auto)


 0.0 


 


 


 0.0-0.1


10^3/uL


 


Sodium Level 140    135-145  MMOL/L


 


Potassium Level 3.4 L   3.6-5.0  MMOL/L


 


Chloride Level 103      MMOL/L


 


Carbon Dioxide Level 19 L   21-32  MMOL/L


 


Anion Gap 18 H   5-14  MMOL/L


 


Blood Urea Nitrogen 6 L   7-18  MG/DL


 


Creatinine


 1.05 


 


 


 0.60-1.30


MG/DL


 


Estimat Glomerular Filtration


Rate > 60 


 


 


  





 


BUN/Creatinine Ratio 6     


 


Glucose Level 99      MG/DL


 


Calcium Level 9.2    8.5-10.1  MG/DL


 


Corrected Calcium     8.5-10.1  MG/DL


 


Magnesium Level 2.3    1.6-2.4  MG/DL


 


Total Bilirubin 0.5    0.1-1.0  MG/DL


 


Aspartate Amino Transf


(AST/SGOT) 33 


 


 


 5-34  U/L





 


Alanine Aminotransferase


(ALT/SGPT) 40 


 


 


 0-55  U/L





 


Alkaline Phosphatase 98      U/L


 


Total Creatine Kinase 609 H     U/L


 


Creatine Kinase MB 1.8    <6.6  NG/ML


 


Myoglobin


 133.4 H


 


 


 10.0-92.0


NG/ML


 


Troponin I < 0.028    <0.028  NG/ML


 


Total Protein 7.7    6.4-8.2  GM/DL


 


Albumin 4.6 H   3.2-4.5  GM/DL


 


Amylase Level 48      U/L


 


Lipase 22    8-78  U/L


 


Acetaminophen Level < 10 L   10-30  UG/ML


 


Serum Alcohol 40 H   <10  MG/DL


 


Glucometer  106     MG/DL


 


Urine Color   YELLOW   


 


Urine Clarity   CLEAR   


 


Urine pH   5.5  5-9  


 


Urine Specific Gravity   1.025 H 1.016-1.022  


 


Urine Protein   NEGATIVE  NEGATIVE  


 


Urine Glucose (UA)   NEGATIVE  NEGATIVE  


 


Urine Ketones   NEGATIVE  NEGATIVE  


 


Urine Nitrite   NEGATIVE  NEGATIVE  


 


Urine Bilirubin   NEGATIVE  NEGATIVE  


 


Urine Urobilinogen   1.0  < = 1.0  MG/DL


 


Urine Leukocyte Esterase   NEGATIVE  NEGATIVE  


 


Urine RBC (Auto)   NEGATIVE  NEGATIVE  


 


Urine RBC   NONE   /HPF


 


Urine WBC   NONE   /HPF


 


Urine Squamous Epithelial


Cells 


 


 0-2 


  /HPF





 


Urine Crystals   NONE   /LPF


 


Urine Bacteria   NEGATIVE   /HPF


 


Urine Casts   PRESENT   /LPF


 


Urine Hyaline Casts   2-5 H  /LPF


 


Urine Mucus   LARGE H  /LPF


 


Urine Culture Indicated   NO   


 


Urine Opiates Screen   NEGATIVE  NEGATIVE  


 


Urine Oxycodone Screen   NEGATIVE  NEGATIVE  


 


Urine Methadone Screen   NEGATIVE  NEGATIVE  


 


Urine Propoxyphene Screen   NEGATIVE  NEGATIVE  


 


Urine Barbiturates Screen   NEGATIVE  NEGATIVE  


 


Ur Tricyclic Antidepressants


Screen 


 


 NEGATIVE 


 NEGATIVE  





 


Urine Phencyclidine Screen   NEGATIVE  NEGATIVE  


 


Urine Amphetamines Screen   NEGATIVE  NEGATIVE  


 


Urine Methamphetamines Screen   NEGATIVE  NEGATIVE  


 


Urine Benzodiazepines Screen   NEGATIVE  NEGATIVE  


 


Urine Cocaine Screen   NEGATIVE  NEGATIVE  


 


Urine Cannabinoids Screen   POSITIVE H NEGATIVE  








My Orders





Orders - JOHANA,RIDDHI K DO


Accucheck Stat ONCE (5/28/21 22:10)


Ed Iv/Invasive Line Start (5/28/21 22:10)


Ekg Tracing (5/28/21 22:10)


Monitor-Rhythm Ecg Trace Only (5/28/21 22:10)


Ct Head/Cervical Spine Wo (5/28/21 22:10)


Cervical Collar (5/28/21 22:10)


Chest 1 View, Ap/Pa Only (5/28/21 22:10)


Acetaminophen (5/28/21 22:10)


Alcohol (5/28/21 22:10)


Amylase (5/28/21 22:10)


Cbc With Automated Diff (5/28/21 22:10)


Comprehensive Metabolic Panel (5/28/21 22:10)


Creatine Kinase (5/28/21 22:10)


Creatine Kinase Mb (5/28/21 22:10)


Drug Screen Stat (Urine) (5/28/21 22:10)


Lipase (5/28/21 22:10)


Magnesium (5/28/21 22:10)


Ua Culture If Indicated (5/28/21 22:10)


Myoglobin Serum (5/28/21 22:10)


Troponin I (5/28/21 22:10)


Ed Iv/Invasive Line Start (5/28/21 22:10)


Lactated Ringers (Lr 1000 Ml Iv Solution (5/28/21 22:15)


Ondansetron Injection (Zofran Injectio (5/28/21 23:00)


Pantoprazole Injection (Protonix Injecti (5/28/21 23:00)


Ketorolac Injection (Toradol Injection) (5/29/21 00:00)





Medications Given in ED





Vital Signs/I&O











 5/28/21 5/29/21





 22:10 00:24


 


Temp 36.2 36.2


 


Pulse 88 88


 


Resp 18 18


 


B/P (MAP) 146/110 (122) 140/98 (122)


 


Pulse Ox  96


 


O2 Delivery Room Air Room Air





Capillary Refill : Less Than 3 Seconds








Blood Pressure Mean:                    122











Point of Care Testing


Finger Stick Blood Glucose:  106


Blood Glucose Action Taken:  PROVIDER NOTIFIED


Progress Note :  


Progress Note


PLACED IN CERVICAL COLLAR AND LAID FLAT





FREQUENT LOOSE COUGH, WHICH LATER RESOLVED 





UNEVENTFUL ER STAY





ECG


Initial ECG Impression Date:  May 28, 2021


Initial ECG Impression Time:  22:40


Initial ECG Rate:  83


Initial ECG Rhythm:  Normal Sinus





Diagnostic Imaging





Comments


CXR--NO ACUTE PROCESS, PENDING RADIOLOGIST REVIEW





CT HEAD/CERVICAL SPINE--NO ACUTE PROCESS, PER [facility] VIA FAX AT 9210


   Reviewed:  Reviewed by Me





Departure


Impression





   Primary Impression:  


   Vasovagal syncope


   Additional Impressions:  


   Head injury with loss of consciousness


   Nausea and vomiting


   Cough


   Alcohol use


   Marijuana use


Disposition:  01 HOME, SELF-CARE


Condition:  Stable





Departure-Patient Inst.


Decision time for Depature:  23:45


Referrals:  


[facility] (PCP)


Primary Care Physician








HENOK JULIAN (Family)


Primary Care Physician


Patient Instructions:  Syncope (Fainting) (DC), Vasovagal Response, Concussion, 

Adult ED, Nausea and Vomiting, Adult (DC), Cough, Adult ED





Add. Discharge Instructions:  


CLEAR LIQUIDS--WATER, BROTH, JELLO, GATORADE





BRATS DIET--BANANAS, RICE, APPLESAUCE, TOAST, SALTINES





NO DRIVING UNTIL CLEARED BY DR. KENNY DRUGS


NO ALCOHOL





FOLLOW UP WITH [facility] ON TUESDAY FOR FURTHER CARE





FOLLOW UP WITH [facility] IN 2-3 DAYS FOR FURTHER CARE





All discharge instructions reviewed with patient and/or family. Voiced 

understanding.


Scripts


Benzonatate (TESSALON PERLES) 100 Mg Capsule


200 MG PO TID, #30 CAP


   Prov: RIDDHI VAUGHN DO         5/28/21 


Guaifenesin/Dextromethorphan (Mucinex Dm ER 1,200-60 mg Tab) 1 Each Tbmp.12hr


1 EACH PO BID, #20 EA


   Prov: RIDDHI VAUGHN DO         5/28/21 


Pantoprazole Sodium (Protonix) 40 Mg Tablet.dr


40 MG PO DAILY, #15 TAB


   Prov: RIDDHI VAUGHN DO         5/28/21 


Ondansetron (Ondansetron Odt) 8 Mg Tab.rapdis


8 MG PO Q4H PRN for NAUSEA/VOMITING, #10 TAB


   Prov: RIDDHI VAUGHN DO         5/28/21











RIDDHI VAUGHN DO                 May 28, 2021 23:47 no

## 2024-04-11 NOTE — BH INPATIENT PSYCHIATRY PROGRESS NOTE - NSBHASSESSSUMMFT_PSY_ALL_CORE
Ania Mei is a 38 year old  female, domiciled at [facility], past medical history of T1D, diabetic neuropathy, HTN, chronic back pain, migraine headaches, pseudoseizures, and with past psychiatric history of depression/anxiety and cluster B personality disorder, 8 previous psychiatric admissions (most recently discharged from [facility] on 6/13/23), multiple previous suicide attempts (remote hx of once by taking a capful of acetone, another by drinking hydrogen peroxide, another attempt by taking half a bottle of ibuprofen; most recent SA about a year ago by ingesting Pine-Sol, no known medical consequences), no legal or violence hx, denies substance use, BIBEMS activated by residence facility after patient expressed suicidal ideation. On assessment in the ED, patient presented with worsening depressive symptoms x2 months, passive suicidal ideation, unable to engage in safety planning, with reported adherence to medication management but with recent disengagement from outpatient care. She denied any AVH, did not appear acutely manic or psychotic.    On assessment on the inpatient unit, patient had linear thought process, normal speech and depressed but full affect. Patient presents with depressed mood, anhedonia, low energy, changed appetite, and suicidal ideation for 2 months, meeting criteria for major depressive episode. Patient denied any history of alvina. Given patient's numerous suicide attempts in the past, and current suicidality, in the context of cluster B personality disorder, patient would benefit from continued voluntary inpatient psychiatric care for safety planning and medication management.     During inpatient admission, patient continued to display linear thought process, depressed affect, and low energy. Given patient's PMHx of Type 1 DM and fingerstick values, endocrine was consulted and medical adjustments to regimen were made per their recommendations. Throughout her admission, she had multiple episodes of being uncooperative and nonverbal in which she maintained a passive and unresponsive demeanor throughout the interaction (despite attempts to elicit a response). Patient complained of adverse effect of Abilify including headache, nausea, vomiting, dizziness, etc. Patient continues to reports low mood, though suicidal ideation has resolved. Due to patient's severity of depressive symptoms, history of suicide attempts and impulsivity, continued hospitalization is required for medication management and safety.     On continued assessment, patient has shown improvement in mood, suicidality (no longer presents), behavior, insight, and judgment regarding psychiatric illness. Patient is responding well to medication and is willing and able to safely continue treatment in the outpatient setting.    Plan:  - Lexapro 10mg PO qHS - Started on admission as home medication, currently tapering off - discontinued as of 04/10/24  - increase Prozac from 10mg to 20mg PO daily - started 4/4/24, increased 4/10/24  - C/W Amitriptyline 75mg PO daily - home med  - Abilify 2mg PO QD - discontinued 4/4 due to reported headache, nausea, vomiting, dizziness  - Discontinued Lamictal 50mg PO QHS - 4/02  - PRNs for agitation: Ativan 2mg + Benadryl 50mg PO/IM q6h  - C/W home medical medications    #Type 1 DM  - reconsult endocrinology for additional hyperglycemia recommendations 4/10/24, Initially consulted 4/02/24  Per endocrine recs:  lantus increased to 24 units BID   lispro increased  to 14 TIDAC  - Continue current insulin sliding scale

## 2024-04-11 NOTE — BH INPATIENT PSYCHIATRY PROGRESS NOTE - NSBHLOCFT_PSY_A_CORE
not responding to verbal or tactile stimulus but noted to be moving when not the center of attention

## 2024-04-11 NOTE — BH INPATIENT PSYCHIATRY PROGRESS NOTE - NSBHATTESTCOMMENTATTENDFT_PSY_A_CORE
Pt seen, chart reviewed and case discussed with treatment team. Agree with assessment and plan
Pt seen, chart reviewed and case discussed with treatment team. Agree with assessment and plan.
Pt seen , chart reviewed and case discussed with treatment team. Agree with assessment and plan
pt seen, chart reviewed and case discussed. Agree with assessment and plan
Pt seen, chart reviewed and case discussed with treatment team. Agree with assessment and plan
Pt seen, chart reviewed and case discussed with treatment team. Agree with assessment and plan    Pt displays attention seeking behaviors, was not responsive during numerous attempts at interview today despite accidentally showing her awareness after we left the room. Reportedly had made complaints to her OP provider about meds given however did  not participate with our attempt to interview
Pt seen, chart reviewed and case discussed with treatment team. Agree with assessment and plan    Pt more cooperative today, Prozac increased to 20mg as discussed. Discharge planning in the works.  BPD related traits a appear to be a hindrance at times to participation in treatment as well as discharge. 
Pt seen, chart reviewed and case discussed with treatment team.     Pt did not report SI today however depression severe and she did not tolerate Abilify and agreed to cross titration between Lexapro and Prozac. Will consider other mod stabilizing agents if Prozac not tolerated or symptoms not improved. Pt with impulsivity and multiple suicide attempts in context of low mood. Requires further inpatient monitoring and stabilization for safety

## 2024-04-11 NOTE — BH INPATIENT PSYCHIATRY DISCHARGE NOTE - NSBHDCMEDICALFT_PSY_A_CORE
#Type 1 DM  - reconsult endocrinology for additional hyperglycemia recommendations 4/10/24, Initially consulted 4/02/24  Per endocrine recs:  lantus increased to 24 units BID   lispro increased  to 14 TIDAC  - Continue current insulin sliding scale # poorly controlled type 1 DM :  -4/1/24 A1c 10.1% at home on insulin pump , non compliant with taking boluses   - now on Lantus 24 units BID and lispro 14 units with meals with sliding scale   -- FS reviewed remain above target   - increase Lantus to 26 units BID   - Increase lispro to 16 units TIDAC   - fow now continue with sliding scale 1: 50 > 150 TIDAC   - when ready for discharge , Ania should resume use of her insulin pump and she will follow up with me at [address] [place] [address] avenue

## 2024-04-12 VITALS
SYSTOLIC BLOOD PRESSURE: 131 MMHG | HEART RATE: 121 BPM | RESPIRATION RATE: 18 BRPM | DIASTOLIC BLOOD PRESSURE: 72 MMHG | TEMPERATURE: 97 F

## 2024-04-12 LAB — GLUCOSE BLDC GLUCOMTR-MCNC: 273 MG/DL — HIGH (ref 70–99)

## 2024-04-12 PROCEDURE — 99232 SBSQ HOSP IP/OBS MODERATE 35: CPT

## 2024-04-12 RX ADMIN — Medication 20 MILLIGRAM(S): at 08:29

## 2024-04-12 RX ADMIN — Medication 3: at 08:31

## 2024-04-12 RX ADMIN — Medication 25 MILLIGRAM(S): at 08:29

## 2024-04-12 RX ADMIN — INSULIN GLARGINE 24 UNIT(S): 100 INJECTION, SOLUTION SUBCUTANEOUS at 08:34

## 2024-04-12 RX ADMIN — Medication 75 MILLIGRAM(S): at 08:28

## 2024-04-12 RX ADMIN — PANTOPRAZOLE SODIUM 40 MILLIGRAM(S): 20 TABLET, DELAYED RELEASE ORAL at 06:18

## 2024-04-12 RX ADMIN — Medication 14 UNIT(S): at 08:33

## 2024-04-12 NOTE — PROGRESS NOTE ADULT - ASSESSMENT
# poorly controlled type 1 DM :  -4/1/24 A1c 10.1% at home on insulin pump , non compliant with taking boluses   - now on Lantus 24 units BID and lispro 14 units with meals with sliding scale   -- FS reviewed remain above target   - increase Lantus to 26 units BID   - Increase lispro to 16 units TIDAC   - fow now continue with sliding scale 1: 50 > 150 TIDAC   - when ready for discharge , Ania should resume use of her insulin pump and she will follow up with me at [address] [place] [address] avenue

## 2024-04-12 NOTE — PROGRESS NOTE ADULT - SUBJECTIVE AND OBJECTIVE BOX
S: FS reviewed , remain above target but improving from prior   o:Vital Signs Last 24 Hrs  T(C): 36.3 (11 Apr 2024 15:49), Max: 36.3 (11 Apr 2024 15:49)  T(F): 97.4 (11 Apr 2024 15:49), Max: 97.4 (11 Apr 2024 15:49)  HR: 96 (11 Apr 2024 15:49) (96 - 97)  BP: 138/59 (11 Apr 2024 15:49) (107/76 - 138/59)  BP(mean): --  RR: 18 (11 Apr 2024 15:49) (18 - 18)  SpO2: --        MEDICATIONS  (STANDING):  amitriptyline 75 milliGRAM(s) Oral daily  dextrose 5%. 1000 milliLiter(s) (100 mL/Hr) IV Continuous <Continuous>  dextrose 5%. 1000 milliLiter(s) (50 mL/Hr) IV Continuous <Continuous>  dextrose 50% Injectable 25 Gram(s) IV Push once  dextrose 50% Injectable 12.5 Gram(s) IV Push once  dextrose 50% Injectable 25 Gram(s) IV Push once  FLUoxetine 20 milliGRAM(s) Oral daily  glucagon  Injectable 1 milliGRAM(s) IntraMuscular once  insulin glargine Injectable (LANTUS) 24 Unit(s) SubCutaneous two times a day  insulin lispro (ADMELOG) corrective regimen sliding scale   SubCutaneous three times a day before meals  insulin lispro Injectable (ADMELOG) 14 Unit(s) SubCutaneous three times a day before meals  metoprolol tartrate 25 milliGRAM(s) Oral three times a day  pantoprazole    Tablet 40 milliGRAM(s) Oral before breakfast  senna 2 Tablet(s) Oral at bedtime    MEDICATIONS  (PRN):  acetaminophen     Tablet .. 650 milliGRAM(s) Oral every 6 hours PRN Temp greater or equal to 38C (100.4F), Mild Pain (1 - 3)  albuterol    90 MICROgram(s) HFA Inhaler 2 Puff(s) Inhalation every 6 hours PRN Shortness of Breath and/or Wheezing  benzonatate 100 milliGRAM(s) Oral three times a day PRN Cough  dextrose Oral Gel 15 Gram(s) Oral once PRN Blood Glucose LESS THAN 70 milliGRAM(s)/deciliter  diphenhydrAMINE 50 milliGRAM(s) Oral every 6 hours PRN Combative behavior  ibuprofen  Tablet. 800 milliGRAM(s) Oral every 6 hours PRN Moderate Pain (4 - 6), Severe Pain (7 - 10)  lidocaine 2% Gel 1 Application(s) Topical every 6 hours PRN anal fissure pain  ondansetron    Tablet 4 milliGRAM(s) Oral every 8 hours PRN Nausea and/or Vomiting

## 2024-04-15 ENCOUNTER — OUTPATIENT (OUTPATIENT)
Dept: OUTPATIENT SERVICES | Facility: HOSPITAL | Age: 39
LOS: 1 days | End: 2024-04-15
Payer: MEDICAID

## 2024-04-15 ENCOUNTER — APPOINTMENT (OUTPATIENT)
Dept: PSYCHIATRY | Facility: CLINIC | Age: 39
End: 2024-04-15

## 2024-04-15 DIAGNOSIS — F41.1 GENERALIZED ANXIETY DISORDER: ICD-10-CM

## 2024-04-15 DIAGNOSIS — F32.A DEPRESSION, UNSPECIFIED: ICD-10-CM

## 2024-04-15 DIAGNOSIS — F41.0 PANIC DISORDER [EPISODIC PAROXYSMAL ANXIETY]: ICD-10-CM

## 2024-04-15 PROCEDURE — 90832 PSYTX W PT 30 MINUTES: CPT

## 2024-04-15 NOTE — BH SOCIAL WORK CONFIRMATION FOLLOW UP NOTE - NSLINKEDTOLOC_PSY_ALL_CORE
Ania attended her outpatient mental health appointment at [facility] today, . 09-Mar-2022 22:36

## 2024-04-16 ENCOUNTER — OUTPATIENT (OUTPATIENT)
Dept: OUTPATIENT SERVICES | Facility: HOSPITAL | Age: 39
LOS: 1 days | End: 2024-04-16
Payer: MEDICAID

## 2024-04-16 ENCOUNTER — APPOINTMENT (OUTPATIENT)
Dept: PSYCHIATRY | Facility: CLINIC | Age: 39
End: 2024-04-16
Payer: MEDICAID

## 2024-04-16 DIAGNOSIS — F41.9 ANXIETY DISORDER, UNSPECIFIED: ICD-10-CM

## 2024-04-16 DIAGNOSIS — F41.1 GENERALIZED ANXIETY DISORDER: ICD-10-CM

## 2024-04-16 DIAGNOSIS — F32.A DEPRESSION, UNSPECIFIED: ICD-10-CM

## 2024-04-16 DIAGNOSIS — F41.0 PANIC DISORDER [EPISODIC PAROXYSMAL ANXIETY]: ICD-10-CM

## 2024-04-16 PROCEDURE — 99214 OFFICE O/P EST MOD 30 MIN: CPT | Mod: 95

## 2024-04-16 RX ORDER — LAMOTRIGINE 100 MG/1
100 TABLET ORAL
Qty: 15 | Refills: 1 | Status: DISCONTINUED | COMMUNITY
Start: 1900-01-01 | End: 2024-04-16

## 2024-04-16 RX ORDER — ESCITALOPRAM OXALATE 20 MG/1
20 TABLET ORAL DAILY
Qty: 30 | Refills: 0 | Status: DISCONTINUED | COMMUNITY
Start: 2023-09-11 | End: 2024-04-16

## 2024-04-16 NOTE — HISTORY OF PRESENT ILLNESS
[Suicidal Behavior/Ideation] : suicidal behavior/ideation [FreeTextEntry1] : The following was written at the time of pt's initial psychiatric eval with the clinic (3/1/22) by Dr. Buck:  "Ms. Mei reported that she initially diagnosed with bipolar disorder "right after 911", that was when she first experienced suicidal ideations, and had her first IPP admission. She reported being in treatment at [facility] until she turned 18 years old and did not engage in treatment again until she was "31 or 32 years old. She reported that she attended this clinic , had 2 sessions and never returned to treatment. She reports that she has had a great deal of stressors, she and her parents became homeless "just before the pandemic", they lived between hotels and their car, her father became severe medically ill (cancer on his nose, liver issues, kidney issues, gall bladder issues, nodule on his lung), after her father had surgery the [facility] was able to facilitate a place for the family and they have resided in [facility] since 10/2020. With all these stressors in her life she noticed her anxiety started to spike, she also witnessed several residents die, her aunt  from COVID, she has gone to [facility] for anxiety and a few medical issues "for a total of 7 times in the last month". She reports being anxious constantly where she experiences dizziness, light handedness, chest pains, heart palpitations, and stomach pains "and they have told me I'm having anxiety & panic attacks". She reported that she saw the psychiatrist at her residence and she prescribed Buspar & melatonin but she "did not feel right on it" and she stopped taking the medications. Her neurologist was not happy with medications the psychiatrist prescribed and residence staff suggested she see someone outside. She reports that she has been experiencing more anxiety than depression but wants to avoid 'going down the bipolar spiral" and that is why she wants to re-engage in treatment in order to get her anxiety under control with appropriate medication." [FreeTextEntry2] : First treatment for depression and suicidality in late teens Approximately 8 IPP stays since then - the last in April 2024. Chronic suicidal thinking, with hx of past attempts.  Reportedly drank bleach/Pine-sol after father's death in 11/2022; prior to this patient has history of 3 remote attempts in her teens - drinking acetone, drinking hydrogen peroxide, and half bottle of ibuprofen. Pt has hx of ER visit for pseudo-seizures. [FreeTextEntry3] : Celexa, Cymbalta, Buspar, Zyprexa, Gabapentin (hives) - none were effective. Lexapro - ineffective Lamictal - fatigue

## 2024-04-16 NOTE — SOCIAL HISTORY
[FreeTextEntry1] : Father  in .  Pt lives with mother in assisted living ([facility]). Pt did not finish high school due to severe depression.

## 2024-04-16 NOTE — PHYSICAL EXAM
[Average] : average [Cooperative] : cooperative [Clear] : clear [Linear/Goal Directed] : linear/goal directed [None] : none [None Reported] : none reported [WNL] : within normal limits [FreeTextEntry8] : "a little better" [de-identified] : increased range [de-identified] : fair [de-identified] : fair

## 2024-04-16 NOTE — PLAN
[Medication education provided] : Medication education provided. [Rationale for medication choices, possible risks/precautions, benefits, alternative treatment choices, and consequences of non-treatment discussed] : Rationale for medication choices, possible risks/precautions, benefits, alternative treatment choices, and consequences of non-treatment discussed with patient/family/caregiver  [FreeTextEntry4] : Medication management to mitigate active depressive and anxiety sx. [FreeTextEntry5] : Continue Prozac at 20mg daily Individual therapy RTC 3 weeks

## 2024-04-16 NOTE — RISK ASSESSMENT
[Yes, patient reports ideation or behavior] : Yes, patient reports ideation or behavior [No, patient denies ideation or behavior] : No, patient denies ideation or behavior [Low acute suicide risk] : Low acute suicide risk [Yes] : Yes [FreeTextEntry8] : The increase in her chronic suicidal thinking prior to her hospitalization earlier this month has subsided.  Denies having any intent or plan. [FreeTextEntry9] : Pt appears safe for treatment at clinic level of care.  S

## 2024-04-16 NOTE — REASON FOR VISIT
[Other Location: e.g. School (Enter Location, City,State)___] : at [unfilled], at the time of the visit. [Other Location: e.g. Home (Enter Location, City,State)___] : at [unfilled] [Mother] : mother [Patient] : the patient [FreeTextEntry1] : Medication management.

## 2024-04-16 NOTE — DISCUSSION/SUMMARY
[FreeTextEntry1] : Pt with chronic depression and anxiety.  Presents a bit better since hospitalization and med change.  We will need to give more time of Prozac.  Pt will discuss possible change in migraine prophylaxis medication before next meeting.

## 2024-04-17 ENCOUNTER — APPOINTMENT (OUTPATIENT)
Dept: GASTROENTEROLOGY | Facility: CLINIC | Age: 39
End: 2024-04-17
Payer: MEDICAID

## 2024-04-17 VITALS
BODY MASS INDEX: 33.8 KG/M2 | HEIGHT: 64 IN | HEART RATE: 97 BPM | DIASTOLIC BLOOD PRESSURE: 80 MMHG | WEIGHT: 198 LBS | OXYGEN SATURATION: 99 % | SYSTOLIC BLOOD PRESSURE: 130 MMHG

## 2024-04-17 DIAGNOSIS — Z96.41 PRESENCE OF INSULIN PUMP (EXTERNAL) (INTERNAL): ICD-10-CM

## 2024-04-17 DIAGNOSIS — E10.40 TYPE 1 DIABETES MELLITUS WITH DIABETIC NEUROPATHY, UNSPECIFIED: ICD-10-CM

## 2024-04-17 DIAGNOSIS — F32.A DEPRESSION, UNSPECIFIED: ICD-10-CM

## 2024-04-17 DIAGNOSIS — K21.9 GASTRO-ESOPHAGEAL REFLUX DISEASE W/OUT ESOPHAGITIS: ICD-10-CM

## 2024-04-17 DIAGNOSIS — K74.00 HEPATIC FIBROSIS, UNSPECIFIED: ICD-10-CM

## 2024-04-17 DIAGNOSIS — K76.0 FATTY (CHANGE OF) LIVER, NOT ELSEWHERE CLASSIFIED: ICD-10-CM

## 2024-04-17 DIAGNOSIS — Z88.0 ALLERGY STATUS TO PENICILLIN: ICD-10-CM

## 2024-04-17 DIAGNOSIS — F41.1 GENERALIZED ANXIETY DISORDER: ICD-10-CM

## 2024-04-17 DIAGNOSIS — Z88.1 ALLERGY STATUS TO OTHER ANTIBIOTIC AGENTS STATUS: ICD-10-CM

## 2024-04-17 DIAGNOSIS — R45.851 SUICIDAL IDEATIONS: ICD-10-CM

## 2024-04-17 DIAGNOSIS — G43.909 MIGRAINE, UNSPECIFIED, NOT INTRACTABLE, WITHOUT STATUS MIGRAINOSUS: ICD-10-CM

## 2024-04-17 DIAGNOSIS — F32.9 MAJOR DEPRESSIVE DISORDER, SINGLE EPISODE, UNSPECIFIED: ICD-10-CM

## 2024-04-17 DIAGNOSIS — R74.8 ABNORMAL LEVELS OF OTHER SERUM ENZYMES: ICD-10-CM

## 2024-04-17 DIAGNOSIS — I10 ESSENTIAL (PRIMARY) HYPERTENSION: ICD-10-CM

## 2024-04-17 DIAGNOSIS — F41.9 ANXIETY DISORDER, UNSPECIFIED: ICD-10-CM

## 2024-04-17 DIAGNOSIS — F60.9 PERSONALITY DISORDER, UNSPECIFIED: ICD-10-CM

## 2024-04-17 DIAGNOSIS — Z56.0 UNEMPLOYMENT, UNSPECIFIED: ICD-10-CM

## 2024-04-17 PROCEDURE — 99215 OFFICE O/P EST HI 40 MIN: CPT | Mod: 25

## 2024-04-17 PROCEDURE — 91200 LIVER ELASTOGRAPHY: CPT

## 2024-04-17 PROCEDURE — G2211 COMPLEX E/M VISIT ADD ON: CPT | Mod: NC,1L

## 2024-04-17 RX ORDER — IBUPROFEN 600 MG/1
600 TABLET, FILM COATED ORAL
Qty: 40 | Refills: 0 | Status: DISCONTINUED | COMMUNITY
Start: 2022-10-21 | End: 2024-04-17

## 2024-04-17 SDOH — ECONOMIC STABILITY - INCOME SECURITY: UNEMPLOYMENT, UNSPECIFIED: Z56.0

## 2024-04-17 NOTE — PHYSICAL EXAM
[Cooperative] : cooperative [Euthymic] : euthymic [Full] : full [Clear] : clear [Linear/Goal Directed] : linear/goal directed [None] : none [None Reported] : none reported [Average] : average [WNL] : within normal limits [FreeTextEntry8] : " Still feeling some depression"  [de-identified] : Fair

## 2024-04-17 NOTE — DISCUSSION/SUMMARY
[Plan Review] : Plan Review [Articulate] : articulate [Cognitively intact] : cognitively intact [Part of a supportive family] : part of a supportive family [Steady employment] : steady employment [Housing stability] : housing stability [English fluency] : English fluency [Connected to healthcare] : connected to healthcare [Access to safe outdoor spaces] : access to safe outdoor spaces [Social supports] : social supports [Mental Health] : Mental Health [Interpersonal Relationships] : Interpersonal Relationships [Adherent to treatment recommendations] : adherent to treatment recommendations [FreeTextEntry2] : 4/15/25 [FreeTextEntry3] : 3/1/22 [FreeTextEntry7] : Limited social and family support from residents at the adult home and her mother. Patient has a codependent relationship with her mother.  [FreeTextEntry8] : Medical issues have interfered with patient keeping regular appointments.  [FreeTextEntry9] : None  [de-identified] : OPD Team, adult home staff, and mother.  [Revised - Rationale] : Revised - Rationale: [Physical Health] : Physical Health [Continued - Progress made] : Continued - Progress made: [every ___ months] : every [unfilled] months [every ___ weeks] : every [unfilled] weeks [A change in level of care is needed as evidenced by:] : A change in level of care is needed as evidenced by: [Other rationale for transition/discharge:] : Other rationale for transition/discharge: [Yes] : Yes [Psychiatric Provider/Prescriber] : Psychiatric Provider/Prescriber [Therapist] : Therapist [de-identified] : Patient had a recent IPP admission due to depression and suicide thoughts.  [de-identified] : Patient will learn and utilize positive coping, bereavement, stress management, and CBT/DBT methods, including DBT, bereavement or IOP groups. [de-identified] : 4/15/24 [de-identified] : Patient needs to work on learning and utilizing positive coping methods on a daily basis.  IOP, DBT skills group, and bereavement group was recommended.  [FreeTextEntry1] : Patient has multiple medical issues and has not been compliant with diabetic diet.  [FreeTextEntry4] : Manage medical issues. " I want to be healthy."   [de-identified] : Patient's prioritized need and goal to be healthier.   [de-identified] : Patient will learn and utilize positive coping, bereavement, stress management, and CBT/DBT methods, including DBT, bereavement or IOP groups. [de-identified] : 4/15/24 [FreeTextEntry5] : [place] [facility]  [de-identified] : Clinic Telehealth Hub with Dr. Curiel. Frequency may change depending on patient's needs.  [de-identified] : In person sessions with DENIS Ortiz LCSW. Frequency may change depending on patient's needs. [de-identified] : If patient is unable to perform activities of daily living and/or expresses suicidal ideation, a higher level of care will be considered. [de-identified] : When patient no longer requires individual psychotherapy and medication in order to perform at baseline, the patient will be discharged.

## 2024-04-17 NOTE — PLAN
[[facility]] : [facility]  [Supportive Therapy] : Supportive Therapy [Recommended Frequency of Visits: ____] : Recommended frequency of visits: [unfilled] [Return in ____ week(s)] : Return in [unfilled] week(s) [FreeTextEntry2] : Goal #1 Continued...."  I want to feel better and  manage my stress."   Objective #1  Revised.. Patient will keep regular therapy sessions, identify triggers of mood disturbance or emotional dysregulation.   Objective #2 Revised..... Patient will learn and utilize positive coping, bereavement, stress management, and CBT/DBT methods, including DBT, bereavement or IOP groups.     Goal #2 Continued... Manage medical issues. " I want to be healthy."  Obj#1  Continued...  Patient will comply with all medical and psychiatric treatment recommendations.      [de-identified] : Patient reports that she is still feeling tired despite medication changes, or discontinuation of Lamictal and Abilify while she was in IPP. She reports that she is now taking 20 mg of Prozac. She reports that she is still having depressive feelings, crying spells, and intrusive thoughts related to death, such as not wanting to wake up. She reports that these thoughts are related to the loss of her father, and several people at her residents who have  and those who have expressed suicide thoughts. She reports not liking that she has these thoughts and questioned what they told her in IPP, that she may have these thoughts for life, and that she has borderline personality or cluster B traits.  She reports that what really upset her was her friend telling her that she should slit her wrist. She reports that she has not been talking to this woman anymore. She denies any thoughts or harming herself or any desire to end her life. Provided education on borderline personality or cluster B traits, and DBT which would help her manage her manage her emotions, relationships, and stress. Encouraged her to join the DBT skills group or IOP groups which are in person. Spoke about CBT and DBT methods, some which she learned while in IPP, such as ACCEPTS and TIP. She reports that getting out this weekend for a walk and shopping was helpful. She reports buying a mug that has a list of affirmations for her to look at and read daily. Spoke about panic attack she had at her last session with her psychiatrist, . Reinforced her for using methods to manage such as deep breathing and paper bag. Spoke about exposures helping her overcome fears and better manage anxiety or panic attacks, rather than avoiding.  Reviewed treatment goals and treatment plan review. She reports feeling good about continuing medication management with Dr. Curiel, and not having to transfer to another therapist.  [FreeTextEntry1] : Patient will focus on positive coping, CBT, and DBT skills. She will continue medication regimen, follow-up with psychiatrist, and follow-up with therapy on 5/1, after writer returns from vacation.

## 2024-04-17 NOTE — RISK ASSESSMENT
[No, patient denies ideation or behavior] : No, patient denies ideation or behavior [FreeTextEntry8] : Patient reports that she is having intrusive thoughts about death or dying, related to the loss of her father, several people at her residents dying and expressing suicide ideation. She reports having such thoughts like not wanting to wake up but denies any self-harm thoughts or intent to hurt herself.

## 2024-04-17 NOTE — PLAN
[[facility]] : [facility]  [Supportive Therapy] : Supportive Therapy [Recommended Frequency of Visits: ____] : Recommended frequency of visits: [unfilled] [Return in ____ week(s)] : Return in [unfilled] week(s) [FreeTextEntry2] : Goal #1 Continued...."  I want to feel better and  manage my stress."   Objective #1  Revised.. Patient will keep regular therapy sessions, identify triggers of mood disturbance or emotional dysregulation.   Objective #2 Revised..... Patient will learn and utilize positive coping, bereavement, stress management, and CBT/DBT methods, including DBT, bereavement or IOP groups.     Goal #2 Continued... Manage medical issues. " I want to be healthy."  Obj#1  Continued...  Patient will comply with all medical and psychiatric treatment recommendations.      [de-identified] : Patient reports that she is still feeling tired despite medication changes, or discontinuation of Lamictal and Abilify while she was in IPP. She reports that she is now taking 20 mg of Prozac. She reports that she is still having depressive feelings, crying spells, and intrusive thoughts related to death, such as not wanting to wake up. She reports that these thoughts are related to the loss of her father, and several people at her residents who have  and those who have expressed suicide thoughts. She reports not liking that she has these thoughts and questioned what they told her in IPP, that she may have these thoughts for life, and that she has borderline personality or cluster B traits.  She reports that what really upset her was her friend telling her that she should slit her wrist. She reports that she has not been talking to this woman anymore. She denies any thoughts or harming herself or any desire to end her life. Provided education on borderline personality or cluster B traits, and DBT which would help her manage her manage her emotions, relationships, and stress. Encouraged her to join the DBT skills group or IOP groups which are in person. Spoke about CBT and DBT methods, some which she learned while in IPP, such as ACCEPTS and TIP. She reports that getting out this weekend for a walk and shopping was helpful. She reports buying a mug that has a list of affirmations for her to look at and read daily. Spoke about panic attack she had at her last session with her psychiatrist, . Reinforced her for using methods to manage such as deep breathing and paper bag. Spoke about exposures helping her overcome fears and better manage anxiety or panic attacks, rather than avoiding.  Reviewed treatment goals and treatment plan review. She reports feeling good about continuing medication management with Dr. Curiel, and not having to transfer to another therapist.  [FreeTextEntry1] : Patient will focus on positive coping, CBT, and DBT skills. She will continue medication regimen, follow-up with psychiatrist, and follow-up with therapy on 5/1, after writer returns from vacation.

## 2024-04-17 NOTE — DISCUSSION/SUMMARY
[Plan Review] : Plan Review [Articulate] : articulate [Cognitively intact] : cognitively intact [Part of a supportive family] : part of a supportive family [Steady employment] : steady employment [Housing stability] : housing stability [English fluency] : English fluency [Connected to healthcare] : connected to healthcare [Access to safe outdoor spaces] : access to safe outdoor spaces [Social supports] : social supports [Mental Health] : Mental Health [Interpersonal Relationships] : Interpersonal Relationships [Adherent to treatment recommendations] : adherent to treatment recommendations [FreeTextEntry2] : 4/15/25 [FreeTextEntry3] : 3/1/22 [FreeTextEntry7] : Limited social and family support from residents at the adult home and her mother. Patient has a codependent relationship with her mother.  [FreeTextEntry8] : Medical issues have interfered with patient keeping regular appointments.  [FreeTextEntry9] : None  [de-identified] : OPD Team, adult home staff, and mother.  [Revised - Rationale] : Revised - Rationale: [Physical Health] : Physical Health [Continued - Progress made] : Continued - Progress made: [every ___ months] : every [unfilled] months [every ___ weeks] : every [unfilled] weeks [A change in level of care is needed as evidenced by:] : A change in level of care is needed as evidenced by: [Other rationale for transition/discharge:] : Other rationale for transition/discharge: [Yes] : Yes [Psychiatric Provider/Prescriber] : Psychiatric Provider/Prescriber [Therapist] : Therapist [de-identified] : Patient had a recent IPP admission due to depression and suicide thoughts.  [de-identified] : Patient will learn and utilize positive coping, bereavement, stress management, and CBT/DBT methods, including DBT, bereavement or IOP groups. [de-identified] : 4/15/24 [de-identified] : Patient needs to work on learning and utilizing positive coping methods on a daily basis.  IOP, DBT skills group, and bereavement group was recommended.  [FreeTextEntry1] : Patient has multiple medical issues and has not been compliant with diabetic diet.  [FreeTextEntry4] : Manage medical issues. " I want to be healthy."   [de-identified] : Patient's prioritized need and goal to be healthier.   [de-identified] : Patient will learn and utilize positive coping, bereavement, stress management, and CBT/DBT methods, including DBT, bereavement or IOP groups. [de-identified] : 4/15/24 [FreeTextEntry5] : [place] [facility]  [de-identified] : Clinic Telehealth Hub with Dr. Curiel. Frequency may change depending on patient's needs.  [de-identified] : In person sessions with DENIS Ortiz LCSW. Frequency may change depending on patient's needs. [de-identified] : If patient is unable to perform activities of daily living and/or expresses suicidal ideation, a higher level of care will be considered. [de-identified] : When patient no longer requires individual psychotherapy and medication in order to perform at baseline, the patient will be discharged.

## 2024-04-17 NOTE — PHYSICAL EXAM
[Cooperative] : cooperative [Euthymic] : euthymic [Full] : full [Clear] : clear [Linear/Goal Directed] : linear/goal directed [None] : none [None Reported] : none reported [Average] : average [WNL] : within normal limits [FreeTextEntry8] : " Still feeling some depression"  [de-identified] : Fair

## 2024-04-17 NOTE — REASON FOR VISIT
[Patient] : Patient [Post-Hospitalization Visit] : This is a post-hospitalization visit [FreeTextEntry1] : Patient is a 38-year-old female seen for a follow-up therapy session, 12:30 PM to 1:00 PM.

## 2024-04-22 ENCOUNTER — NON-APPOINTMENT (OUTPATIENT)
Age: 39
End: 2024-04-22

## 2024-04-22 ENCOUNTER — RX RENEWAL (OUTPATIENT)
Age: 39
End: 2024-04-22

## 2024-04-22 RX ORDER — ALBUTEROL SULFATE 90 UG/1
108 (90 BASE) INHALANT RESPIRATORY (INHALATION)
Qty: 1 | Refills: 6 | Status: ACTIVE | COMMUNITY
Start: 2024-02-12 | End: 1900-01-01

## 2024-04-24 ENCOUNTER — RX RENEWAL (OUTPATIENT)
Age: 39
End: 2024-04-24

## 2024-04-24 PROBLEM — K21.9 GERD (GASTROESOPHAGEAL REFLUX DISEASE): Status: ACTIVE | Noted: 2021-12-09

## 2024-04-24 PROBLEM — R74.8 ELEVATED LIVER ENZYMES: Status: ACTIVE | Noted: 2024-04-24

## 2024-04-24 PROBLEM — K76.0 HEPATIC STEATOSIS: Status: ACTIVE | Noted: 2023-02-13

## 2024-04-24 PROBLEM — K21.9 ACID REFLUX: Status: ACTIVE | Noted: 2018-10-01

## 2024-04-24 RX ORDER — AMITRIPTYLINE HYDROCHLORIDE 75 MG/1
75 TABLET, FILM COATED ORAL
Qty: 30 | Refills: 3 | Status: DISCONTINUED | COMMUNITY
End: 2024-04-24

## 2024-04-24 RX ORDER — BENZONATATE 200 MG/1
200 CAPSULE ORAL EVERY 4 HOURS
Qty: 180 | Refills: 0 | Status: ACTIVE | COMMUNITY
Start: 2024-03-13 | End: 1900-01-01

## 2024-04-24 NOTE — REVIEW OF SYSTEMS
[Fever] : no fever [Chills] : no chills [Recent Weight Loss (___ Lbs)] : no recent weight loss [Eye Pain] : no eye pain [Chest Pain] : no chest pain [Palpitations] : no palpitations [Cough] : no cough [SOB on Exertion] : no shortness of breath during exertion [As Noted in HPI] : as noted in HPI [Joint Swelling] : no joint swelling [Skin Lesions] : no skin lesions [Confused] : no confusion [Convulsions] : no convulsions [Easy Bleeding] : no tendency for easy bleeding [Easy Bruising] : no tendency for easy bruising [Negative] : ENT

## 2024-04-24 NOTE — PHYSICAL EXAM
[Scleral Icterus] : No Scleral Icterus [Spider Angioma] : No spider angioma(s) were observed [Abdominal  Ascites] : no ascites [Asterixis] : no asterixis observed [Jaundice] : No jaundice [General Appearance - Alert] : alert [FreeTextEntry1] : obese BMI 31 [Sclera] : the sclera and conjunctiva were normal [Outer Ear] : the ears and nose were normal in appearance [Neck Appearance] : the appearance of the neck was normal [Respiration, Rhythm And Depth] : normal respiratory rhythm and effort [Auscultation Breath Sounds / Voice Sounds] : lungs were clear to auscultation bilaterally [Heart Sounds] : normal S1 and S2 [Murmurs] : no murmurs [Abdomen Soft] : soft [Abdomen Tenderness] : non-tender [Cervical Lymph Nodes Enlarged Posterior Bilaterally] : posterior cervical [Cervical Lymph Nodes Enlarged Anterior Bilaterally] : anterior cervical [Supraclavicular Lymph Nodes Enlarged Bilaterally] : supraclavicular [Nail Clubbing] : no clubbing  or cyanosis of the fingernails [] : no rash [No Focal Deficits] : no focal deficits [Oriented To Time, Place, And Person] : oriented to person, place, and time

## 2024-04-24 NOTE — HISTORY OF PRESENT ILLNESS
[de-identified] : 10/06/23 [de-identified] : 37 yo F with elevated liver enzymes, fatty liver. Intermittent elevation of liver enzymes intermittently since 2018. Had significant elevation of liver tests in Dec 2021 with    while admitted for abdominal pain and then returned to baseline. Admitted with chest pain due to COVID in Dec 2022 with elevation of liver tests. Labs in Jan 2023 AST 28 aLT 46 . Fatty liver on imaging since Nov 2022.  No jaundice confusion hematemesis or melena Reports upper abdominal pain for past 2 years. Pain radiates to back and sides. Crampy pain. Initially on and off and but now constant all day. No relation to food or BM or position. No specific increasing or reducing factors. No nausea vomiting or diarrhea or constipation. Reports lots of gas. Has heart burn acid reflux. No dysphagia or odynophagia. Unable to do MRI due to claustrophobia. Hx of T1DM on insulin from first month. Has been having uncontrolled blood sugar.  Had lost 20 lb but regained it.       [FreeTextEntry1] : Awaiting disability. Previously in retail. Lives with mother in assisted living. Adopted No alcohol smoking or drug use.

## 2024-04-24 NOTE — ASSESSMENT
[FreeTextEntry1] : 38 y  o  F with type 1 DM obesity with elevated liver tests MASLD>  MASLD Patient has elevated liver tests for several years. Has Met S. Sep 2023 labs - AST 28 ALT 70  SETH AMA ASMA hepatitis B C negative iron sat normal Ig G A M levels normal A1AT - MM TTG Ig A normal. Fibrosure shows S2-3 steatosis and no fibrosis. Fibroscan Feb 2023 -  S1 steatosis LS 7.3 F0-F1 Repeat Fibroscan S3 steatosis  LS 11 F3 fibrosis. Mar 2024 labs Normal platelets. AST 52 ALt 108  US shows fatty liver Multiple US abdomen CT abdomen and also had MRI abdomen with no lesion reported Patient is on insulin therapy. Does not want GLP1 agonist for wt loss. Sees endocrinology. With DM would avoid Vitamin E as well Advised wt loss of 10 % - diet and if possible, exercise. Limited by neuropathy Repeat Fibroscan in 6 months and if F2 or F3 fibrosis  will consider resmitirom on follow up.  Abdominal pain Chronic nonspecific pain. No alarm symptoms and no concerns on imaging ESR 20 Patient appears to have anxiety. Likely IBS Seeing GI.  Health maintenance Not immune to hepatitis A and B. Advised vaccination.

## 2024-04-27 ENCOUNTER — EMERGENCY (EMERGENCY)
Facility: HOSPITAL | Age: 39
LOS: 0 days | Discharge: HOME | End: 2024-04-27
Attending: EMERGENCY MEDICINE
Payer: MEDICAID

## 2024-04-27 VITALS
HEIGHT: 64 IN | OXYGEN SATURATION: 99 % | WEIGHT: 190.04 LBS | RESPIRATION RATE: 20 BRPM | SYSTOLIC BLOOD PRESSURE: 124 MMHG | DIASTOLIC BLOOD PRESSURE: 80 MMHG | TEMPERATURE: 99 F | HEART RATE: 130 BPM

## 2024-04-27 VITALS — HEART RATE: 117 BPM

## 2024-04-27 DIAGNOSIS — K21.9 GASTRO-ESOPHAGEAL REFLUX DISEASE WITHOUT ESOPHAGITIS: ICD-10-CM

## 2024-04-27 DIAGNOSIS — E11.9 TYPE 2 DIABETES MELLITUS WITHOUT COMPLICATIONS: ICD-10-CM

## 2024-04-27 DIAGNOSIS — R11.2 NAUSEA WITH VOMITING, UNSPECIFIED: ICD-10-CM

## 2024-04-27 DIAGNOSIS — Z96.41 PRESENCE OF INSULIN PUMP (EXTERNAL) (INTERNAL): ICD-10-CM

## 2024-04-27 DIAGNOSIS — Z86.79 PERSONAL HISTORY OF OTHER DISEASES OF THE CIRCULATORY SYSTEM: ICD-10-CM

## 2024-04-27 DIAGNOSIS — M48.00 SPINAL STENOSIS, SITE UNSPECIFIED: ICD-10-CM

## 2024-04-27 DIAGNOSIS — R07.9 CHEST PAIN, UNSPECIFIED: ICD-10-CM

## 2024-04-27 DIAGNOSIS — R11.10 VOMITING, UNSPECIFIED: ICD-10-CM

## 2024-04-27 DIAGNOSIS — R00.0 TACHYCARDIA, UNSPECIFIED: ICD-10-CM

## 2024-04-27 DIAGNOSIS — Z20.822 CONTACT WITH AND (SUSPECTED) EXPOSURE TO COVID-19: ICD-10-CM

## 2024-04-27 DIAGNOSIS — R10.816 EPIGASTRIC ABDOMINAL TENDERNESS: ICD-10-CM

## 2024-04-27 DIAGNOSIS — R10.9 UNSPECIFIED ABDOMINAL PAIN: ICD-10-CM

## 2024-04-27 DIAGNOSIS — Z88.1 ALLERGY STATUS TO OTHER ANTIBIOTIC AGENTS STATUS: ICD-10-CM

## 2024-04-27 DIAGNOSIS — Z88.0 ALLERGY STATUS TO PENICILLIN: ICD-10-CM

## 2024-04-27 LAB
ALBUMIN SERPL ELPH-MCNC: 4.5 G/DL — SIGNIFICANT CHANGE UP (ref 3.5–5.2)
ALP SERPL-CCNC: 230 U/L — HIGH (ref 30–115)
ALT FLD-CCNC: 143 U/L — HIGH (ref 0–41)
ANION GAP SERPL CALC-SCNC: 14 MMOL/L — SIGNIFICANT CHANGE UP (ref 7–14)
APPEARANCE UR: CLEAR — SIGNIFICANT CHANGE UP
AST SERPL-CCNC: 66 U/L — HIGH (ref 0–41)
B-OH-BUTYR SERPL-SCNC: 0.3 MMOL/L — SIGNIFICANT CHANGE UP
BASOPHILS # BLD AUTO: 0.04 K/UL — SIGNIFICANT CHANGE UP (ref 0–0.2)
BASOPHILS NFR BLD AUTO: 0.3 % — SIGNIFICANT CHANGE UP (ref 0–1)
BILIRUB SERPL-MCNC: 0.5 MG/DL — SIGNIFICANT CHANGE UP (ref 0.2–1.2)
BILIRUB UR-MCNC: NEGATIVE — SIGNIFICANT CHANGE UP
BUN SERPL-MCNC: 13 MG/DL — SIGNIFICANT CHANGE UP (ref 10–20)
CALCIUM SERPL-MCNC: 9.4 MG/DL — SIGNIFICANT CHANGE UP (ref 8.4–10.5)
CHLORIDE SERPL-SCNC: 94 MMOL/L — LOW (ref 98–110)
CO2 SERPL-SCNC: 25 MMOL/L — SIGNIFICANT CHANGE UP (ref 17–32)
COLOR SPEC: YELLOW — SIGNIFICANT CHANGE UP
CREAT SERPL-MCNC: 0.6 MG/DL — LOW (ref 0.7–1.5)
DIFF PNL FLD: NEGATIVE — SIGNIFICANT CHANGE UP
EGFR: 118 ML/MIN/1.73M2 — SIGNIFICANT CHANGE UP
EOSINOPHIL # BLD AUTO: 0.04 K/UL — SIGNIFICANT CHANGE UP (ref 0–0.7)
EOSINOPHIL NFR BLD AUTO: 0.3 % — SIGNIFICANT CHANGE UP (ref 0–8)
FLUAV AG NPH QL: SIGNIFICANT CHANGE UP
FLUBV AG NPH QL: SIGNIFICANT CHANGE UP
GAS PNL BLDV: SIGNIFICANT CHANGE UP
GLUCOSE SERPL-MCNC: 285 MG/DL — HIGH (ref 70–99)
GLUCOSE UR QL: >=1000 MG/DL
HCG SERPL QL: NEGATIVE — SIGNIFICANT CHANGE UP
HCT VFR BLD CALC: 43 % — SIGNIFICANT CHANGE UP (ref 37–47)
HGB BLD-MCNC: 14.1 G/DL — SIGNIFICANT CHANGE UP (ref 12–16)
IMM GRANULOCYTES NFR BLD AUTO: 0.9 % — HIGH (ref 0.1–0.3)
KETONES UR-MCNC: 15 MG/DL
LEUKOCYTE ESTERASE UR-ACNC: NEGATIVE — SIGNIFICANT CHANGE UP
LIDOCAIN IGE QN: 11 U/L — SIGNIFICANT CHANGE UP (ref 7–60)
LYMPHOCYTES # BLD AUTO: 0.79 K/UL — LOW (ref 1.2–3.4)
LYMPHOCYTES # BLD AUTO: 6.8 % — LOW (ref 20.5–51.1)
MCHC RBC-ENTMCNC: 27.4 PG — SIGNIFICANT CHANGE UP (ref 27–31)
MCHC RBC-ENTMCNC: 32.8 G/DL — SIGNIFICANT CHANGE UP (ref 32–37)
MCV RBC AUTO: 83.5 FL — SIGNIFICANT CHANGE UP (ref 81–99)
MONOCYTES # BLD AUTO: 0.49 K/UL — SIGNIFICANT CHANGE UP (ref 0.1–0.6)
MONOCYTES NFR BLD AUTO: 4.2 % — SIGNIFICANT CHANGE UP (ref 1.7–9.3)
NEUTROPHILS # BLD AUTO: 10.09 K/UL — HIGH (ref 1.4–6.5)
NEUTROPHILS NFR BLD AUTO: 87.5 % — HIGH (ref 42.2–75.2)
NITRITE UR-MCNC: NEGATIVE — SIGNIFICANT CHANGE UP
NRBC # BLD: 0 /100 WBCS — SIGNIFICANT CHANGE UP (ref 0–0)
PH UR: 6.5 — SIGNIFICANT CHANGE UP (ref 5–8)
PLATELET # BLD AUTO: 426 K/UL — HIGH (ref 130–400)
PMV BLD: 10.1 FL — SIGNIFICANT CHANGE UP (ref 7.4–10.4)
POTASSIUM SERPL-MCNC: 4.9 MMOL/L — SIGNIFICANT CHANGE UP (ref 3.5–5)
POTASSIUM SERPL-SCNC: 4.9 MMOL/L — SIGNIFICANT CHANGE UP (ref 3.5–5)
PROT SERPL-MCNC: 8.2 G/DL — HIGH (ref 6–8)
PROT UR-MCNC: NEGATIVE MG/DL — SIGNIFICANT CHANGE UP
RBC # BLD: 5.15 M/UL — SIGNIFICANT CHANGE UP (ref 4.2–5.4)
RBC # FLD: 15.6 % — HIGH (ref 11.5–14.5)
RSV RNA NPH QL NAA+NON-PROBE: SIGNIFICANT CHANGE UP
SARS-COV-2 RNA SPEC QL NAA+PROBE: SIGNIFICANT CHANGE UP
SODIUM SERPL-SCNC: 133 MMOL/L — LOW (ref 135–146)
SP GR SPEC: >1.03 — HIGH (ref 1–1.03)
UROBILINOGEN FLD QL: 1 MG/DL — SIGNIFICANT CHANGE UP (ref 0.2–1)
WBC # BLD: 11.55 K/UL — HIGH (ref 4.8–10.8)
WBC # FLD AUTO: 11.55 K/UL — HIGH (ref 4.8–10.8)

## 2024-04-27 PROCEDURE — 81003 URINALYSIS AUTO W/O SCOPE: CPT

## 2024-04-27 PROCEDURE — 93010 ELECTROCARDIOGRAM REPORT: CPT

## 2024-04-27 PROCEDURE — 99285 EMERGENCY DEPT VISIT HI MDM: CPT

## 2024-04-27 PROCEDURE — 84295 ASSAY OF SERUM SODIUM: CPT

## 2024-04-27 PROCEDURE — 84132 ASSAY OF SERUM POTASSIUM: CPT

## 2024-04-27 PROCEDURE — 99285 EMERGENCY DEPT VISIT HI MDM: CPT | Mod: 25

## 2024-04-27 PROCEDURE — 82010 KETONE BODYS QUAN: CPT

## 2024-04-27 PROCEDURE — 74177 CT ABD & PELVIS W/CONTRAST: CPT | Mod: 26,MC

## 2024-04-27 PROCEDURE — 96376 TX/PRO/DX INJ SAME DRUG ADON: CPT

## 2024-04-27 PROCEDURE — 0241U: CPT

## 2024-04-27 PROCEDURE — 80053 COMPREHEN METABOLIC PANEL: CPT

## 2024-04-27 PROCEDURE — 85025 COMPLETE CBC W/AUTO DIFF WBC: CPT

## 2024-04-27 PROCEDURE — 93005 ELECTROCARDIOGRAM TRACING: CPT

## 2024-04-27 PROCEDURE — 85018 HEMOGLOBIN: CPT

## 2024-04-27 PROCEDURE — 74177 CT ABD & PELVIS W/CONTRAST: CPT | Mod: MC

## 2024-04-27 PROCEDURE — 83690 ASSAY OF LIPASE: CPT

## 2024-04-27 PROCEDURE — 82330 ASSAY OF CALCIUM: CPT

## 2024-04-27 PROCEDURE — 83605 ASSAY OF LACTIC ACID: CPT

## 2024-04-27 PROCEDURE — 36415 COLL VENOUS BLD VENIPUNCTURE: CPT

## 2024-04-27 PROCEDURE — 85014 HEMATOCRIT: CPT

## 2024-04-27 PROCEDURE — 96374 THER/PROPH/DIAG INJ IV PUSH: CPT | Mod: XU

## 2024-04-27 PROCEDURE — 84703 CHORIONIC GONADOTROPIN ASSAY: CPT

## 2024-04-27 PROCEDURE — 82803 BLOOD GASES ANY COMBINATION: CPT

## 2024-04-27 RX ORDER — SODIUM CHLORIDE 9 MG/ML
1000 INJECTION INTRAMUSCULAR; INTRAVENOUS; SUBCUTANEOUS ONCE
Refills: 0 | Status: COMPLETED | OUTPATIENT
Start: 2024-04-27 | End: 2024-04-27

## 2024-04-27 RX ORDER — ONDANSETRON 8 MG/1
4 TABLET, FILM COATED ORAL ONCE
Refills: 0 | Status: COMPLETED | OUTPATIENT
Start: 2024-04-27 | End: 2024-04-27

## 2024-04-27 RX ORDER — ONDANSETRON 8 MG/1
1 TABLET, FILM COATED ORAL
Qty: 6 | Refills: 0
Start: 2024-04-27 | End: 2024-04-28

## 2024-04-27 RX ORDER — ACETAMINOPHEN 500 MG
975 TABLET ORAL ONCE
Refills: 0 | Status: COMPLETED | OUTPATIENT
Start: 2024-04-27 | End: 2024-04-27

## 2024-04-27 RX ADMIN — SODIUM CHLORIDE 1000 MILLILITER(S): 9 INJECTION INTRAMUSCULAR; INTRAVENOUS; SUBCUTANEOUS at 14:59

## 2024-04-27 RX ADMIN — SODIUM CHLORIDE 1000 MILLILITER(S): 9 INJECTION INTRAMUSCULAR; INTRAVENOUS; SUBCUTANEOUS at 11:00

## 2024-04-27 RX ADMIN — ONDANSETRON 4 MILLIGRAM(S): 8 TABLET, FILM COATED ORAL at 13:00

## 2024-04-27 RX ADMIN — ONDANSETRON 4 MILLIGRAM(S): 8 TABLET, FILM COATED ORAL at 11:00

## 2024-04-27 RX ADMIN — Medication 975 MILLIGRAM(S): at 15:58

## 2024-04-27 NOTE — ED PROVIDER NOTE - ATTENDING APP SHARED VISIT CONTRIBUTION OF CARE
Pt has a history of diabetes on insulin pump. This morning woke up and felt dizzy, chest and abdominal pain. vomited 6 times. Recently started Prozac 20 mg and is on amitriptyline. Pt states she will be tapering dose of Amitriptyline. On exam S1S2 rrr, no murmur, lungs clear, abdomen is soft mild epigastric tenderness.

## 2024-04-27 NOTE — ED PROVIDER NOTE - [id number]_ED_A_ED
Problem: Discharge Planning  Goal: Discharge to home or other facility with appropriate resources  Outcome: Progressing     Problem: Pain  Goal: Verbalizes/displays adequate comfort level or baseline comfort level  Outcome: Progressing     Problem: Safety - Adult  Goal: Free from fall injury  Outcome: Progressing Attending Attestation (For Attendings USE Only)...

## 2024-04-27 NOTE — ED PROVIDER NOTE - CLINICAL SUMMARY MEDICAL DECISION MAKING FREE TEXT BOX
Pt presents after vomiting x 6. Also had abdomen and chest pain. CT scan neg. Pt presents after vomiting x 6. Also had abdomen and chest pain. CT scan neg. Pt felt better after hydration

## 2024-04-27 NOTE — ED PROVIDER NOTE - NSFOLLOWUPINSTRUCTIONS_ED_ALL_ED_FT
Acute Nausea and Vomiting    WHAT YOU NEED TO KNOW:    Acute nausea and vomiting start suddenly, worsen quickly, and last a short time.    DISCHARGE INSTRUCTIONS:    Return to the emergency department if:     You see blood in your vomit or your bowel movements.      You have sudden, severe pain in your chest and upper abdomen after hard vomiting or retching.      You have swelling in your neck and chest.       You are dizzy, cold, and thirsty and your eyes and mouth are dry.      You are urinating very little or not at all.      You have muscle weakness, leg cramps, and trouble breathing.       Your heart is beating much faster than normal.       You continue to vomit for more than 48 hours.     Contact your healthcare provider if:     You have frequent dry heaves (vomiting but nothing comes out).      Your nausea and vomiting does not get better or go away after you use medicine.      You have questions or concerns about your condition or treatment.    Medicines: You may need any of the following:     Medicines may be given to calm your stomach and stop your vomiting. You may also need medicines to help you feel more relaxed or to stop nausea and vomiting caused by motion sickness.      Gastrointestinal stimulants are used to help empty your stomach and bowels. This may help decrease nausea and vomiting.      Take your medicine as directed. Contact your healthcare provider if you think your medicine is not helping or if you have side effects. Tell him or her if you are allergic to any medicine. Keep a list of the medicines, vitamins, and herbs you take. Include the amounts, and when and why you take them. Bring the list or the pill bottles to follow-up visits. Carry your medicine list with you in case of an emergency.    Prevent or manage acute nausea and vomiting:     Do not drink alcohol. Alcohol may upset or irritate your stomach. Too much alcohol can also cause acute nausea and vomiting.      Control stress. Headaches due to stress may cause nausea and vomiting. Find ways to relax and manage your stress. Get more rest and sleep.      Drink more liquids as directed. Vomiting can lead to dehydration. It is important to drink more liquids to help replace lost body fluids. Ask your healthcare provider how much liquid to drink each day and which liquids are best for you. Your provider may recommend that you drink an oral rehydration solution (ORS). ORS contains water, salts, and sugar that are needed to replace the lost body fluids. Ask what kind of ORS to use, how much to drink, and where to get it.      Eat smaller meals, more often. Eat small amounts of food every 2 to 3 hours, even if you are not hungry. Food in your stomach may decrease your nausea.      Talk to your healthcare provider before you take over-the-counter (OTC) medicines. These medicines can cause serious problems if you use certain other medicines, or you have a medical condition. You may have problems if you use too much or use them for longer than the label says. Follow directions on the label carefully.     Follow up with your healthcare provider as directed: Write down your questions so you remember to ask them during your follow-up visits.       © Copyright [employer] 2019 All illustrations and images included in CareNotes are the copyrighted property of [employer] or [employer].

## 2024-04-27 NOTE — ED ADULT NURSE NOTE - PATIENT'S GENDER IDENTITY
Female Advancement Flap (Double) Text: The defect edges were debeveled with a #15 scalpel blade.  Given the location of the defect and the proximity to free margins a double advancement flap was deemed most appropriate.  Using a sterile surgical marker, the appropriate advancement flaps were drawn incorporating the defect and placing the expected incisions within the relaxed skin tension lines where possible.    The area thus outlined was incised deep to adipose tissue with a #15 scalpel blade.  The skin margins were undermined to an appropriate distance in all directions utilizing iris scissors.

## 2024-04-27 NOTE — ED ADULT NURSE NOTE - NSFALLUNIVINTERV_ED_ALL_ED
Bed/Stretcher in lowest position, wheels locked, appropriate side rails in place/Call bell, personal items and telephone in reach/Instruct patient to call for assistance before getting out of bed/chair/stretcher/Non-slip footwear applied when patient is off stretcher/[place] to call system/Physically safe environment - no spills, clutter or unnecessary equipment/Purposeful proactive rounding/Room/bathroom lighting operational, light cord in reach

## 2024-04-27 NOTE — ED PROVIDER NOTE - PROGRESS NOTE DETAILS
pt feeling better, sleeping. tolerated po. tachy improved and c/w pts baseline. d/w mom and agrees with plan to dc home. Risk of early atypical surgical process considered and discussed with patient.  Patient agrees to return if any new or worse abdominal or any other symptoms. Pt advised against spicy/acidic/greasy dishes, wine/alcohol, drinks with high glucose content, caffeine and/or dairy products

## 2024-04-27 NOTE — ED PROVIDER NOTE - PATIENT PORTAL LINK FT
You can access the FollowMyHealth Patient Portal offered by [facility] by registering at the following website: [email or web address]. By joining [employer]s FollowMyHealth portal, you will also be able to view your health information using other applications (apps) compatible with our system.

## 2024-04-27 NOTE — ED ADULT NURSE NOTE - SUICIDE SCREENING QUESTION 2
Goal Outcome Evaluation:   Rapid response called on patient while on PCU, pt transported to ICU and became unresponsive upon transfer to bed. No pulse was found, ACLS protocol performed until wife gave permission to cease attempting to resuscitate. TOD pronounced 0043.               Problem: Adult Inpatient Plan of Care  Goal: Plan of Care Review  Outcome: Unable to Meet, Plan Revised  Goal: Patient-Specific Goal (Individualized)  Outcome: Unable to Meet, Plan Revised  Goal: Absence of Hospital-Acquired Illness or Injury  Outcome: Unable to Meet, Plan Revised  Goal: Optimal Comfort and Wellbeing  Outcome: Unable to Meet, Plan Revised  Goal: Readiness for Transition of Care  Outcome: Unable to Meet, Plan Revised     Problem: Fall Injury Risk  Goal: Absence of Fall and Fall-Related Injury  Outcome: Unable to Meet, Plan Revised     Problem: Asthma Comorbidity  Goal: Maintenance of Asthma Control  Outcome: Unable to Meet, Plan Revised     Problem: COPD (Chronic Obstructive Pulmonary Disease) Comorbidity  Goal: Maintenance of COPD Symptom Control  Outcome: Unable to Meet, Plan Revised     Problem: Heart Failure Comorbidity  Goal: Maintenance of Heart Failure Symptom Control  Outcome: Unable to Meet, Plan Revised      No

## 2024-04-27 NOTE — ED PROVIDER NOTE - OBJECTIVE STATEMENT
pt with pmhx GERD, Spinal stenosis, tachycardia, DM, and pysch disorder presents to ED c/o chest and abdominal pain assoc with nbnb vomiting since this AM. pain is sharp, nonradiating, moderate. denies exacerbating or relieving factors. Denies fever/chill/HA/dizziness/palpitation/sob/d/ black stool/bloody stool/urinary sxs

## 2024-05-01 ENCOUNTER — OUTPATIENT (OUTPATIENT)
Dept: OUTPATIENT SERVICES | Facility: HOSPITAL | Age: 39
LOS: 1 days | End: 2024-05-01
Payer: MEDICAID

## 2024-05-01 ENCOUNTER — APPOINTMENT (OUTPATIENT)
Dept: PSYCHIATRY | Facility: CLINIC | Age: 39
End: 2024-05-01

## 2024-05-01 DIAGNOSIS — F32.A DEPRESSION, UNSPECIFIED: ICD-10-CM

## 2024-05-01 DIAGNOSIS — F41.0 PANIC DISORDER [EPISODIC PAROXYSMAL ANXIETY]: ICD-10-CM

## 2024-05-01 DIAGNOSIS — F41.1 GENERALIZED ANXIETY DISORDER: ICD-10-CM

## 2024-05-01 PROCEDURE — 90832 PSYTX W PT 30 MINUTES: CPT

## 2024-05-01 NOTE — REASON FOR VISIT
[Patient] : Patient [FreeTextEntry1] : Patient is a 38-year-old female seen for a follow-up therapy session, 1:30 PM to 2:00 PM.

## 2024-05-01 NOTE — PLAN
[[facility]] : [facility]  [Supportive Therapy] : Supportive Therapy [Recommended Frequency of Visits: ____] : Recommended frequency of visits: [unfilled] [Return in ____ week(s)] : Return in [unfilled] week(s) [FreeTextEntry2] : Goal #1" I want to feel better and manage my stress."   Objective #1 Patient will keep regular therapy sessions, identify triggers of mood disturbance or emotional dysregulation.   Objective #2 Patient will learn and utilize positive coping, bereavement, stress management, and CBT/DBT methods, including DBT, bereavement or IOP groups.     Goal #2 Manage medical issues. " I want to be healthy."  Obj#1   Patient will comply with all medical and psychiatric treatment recommendations.      [de-identified] : Patient reports that she was in the ER over the weekend for vomiting and dizziness. She thought it was a side effect of the Prozac, but she reports that her psychiatrist, Dr. Curiel, told her that she would have had those symptoms when she first started taking the Prozac, and that her Prozac dosage may need to be increased. She will be following up with Dr. Curiel on 5/6. She reports that her anxiety is not any better, and that her heart rate and blood pressure have been elevated. She denies any self-harm thoughts. She reports that there was another death at her residence, and that being around people who are sick and dying is depressing. She also reports that these two months will be difficult due to the fact that it would have been her parents and grandparents wedding anniversary. Spoke about positive coping and grieving methods. She believes that the cardinal she saw a sign from her father. She reports that listening to music, deep breathing, and coloring also helps her mood and anxiety. She is looking forward to spending her birthday at [facility] for pancakes with her mother. She reports seeing her Neurologist who is weaning her off of Amitriptyline, so he can then start her on Topamax for her migraines. She reports was sleeping a lot, especially over the weekend due to her stomach issues, but now she is able to get out. She reports feeling good about going to the store, even on her own. Spoke about benefits of IOP and DBT skills group. She accepted referral to these groups.  [FreeTextEntry1] : Patient will focus on positive coping, grieving, and CBT methods. She will continue medication regimen, follow-up with psychiatrist on 5/6, and follow-up with therapy on 5/8.

## 2024-05-01 NOTE — PHYSICAL EXAM
[Cooperative] : cooperative [Euthymic] : euthymic [Full] : full [Clear] : clear [Linear/Goal Directed] : linear/goal directed [None] : none [None Reported] : none reported [Average] : average [WNL] : within normal limits [de-identified] : Fair

## 2024-05-01 NOTE — ED ADULT NURSE NOTE - NSICDXFAMILYHX_GEN_ALL_CORE_FT
FAMILY HISTORY:  No pertinent family history in first degree relatives    
Develop coping skills.  For example, strategies and lifestyle changes that reduce negative moods, stress, and exposure to smoking cues.

## 2024-05-02 DIAGNOSIS — F41.0 PANIC DISORDER [EPISODIC PAROXYSMAL ANXIETY]: ICD-10-CM

## 2024-05-02 DIAGNOSIS — F32.A DEPRESSION, UNSPECIFIED: ICD-10-CM

## 2024-05-02 DIAGNOSIS — F41.1 GENERALIZED ANXIETY DISORDER: ICD-10-CM

## 2024-05-03 NOTE — REVIEW OF SYSTEMS
Advanced Practitioner Screening Evaluation    I have evaluated the patient for the purpose of an initial medical screening and to expedite care.     Yvonne Broderick is a 61 year old female who presents to the emergency department today with right lower quadrant pain for 3 days.  No recent travel, started a new job after a year.    Limited screening exam:  Gen: Pt is alert, in no acute distress  Resp: Breathing comfortably, unlabored  Extremity: Swelling noted to the right lower extremity, tender to palpation.    Initial orders have been placed for the patient's diagnosis and treatment.         This note may have been created using voice dictation technology and may include inadvertent transcriptional errors. Any such errors should be contextually interpreted.    Note to patient: The 21st Century Cures Act makes medical notes available to patients in the interest of transparency. Please be advised this note is a medical document. Medical documents are intended to carry relevant information, convey facts as evidence, and include the clinical opinion/interpretation of the practitioner. The medical note is intended as peer to peer communication and may appear blunt or direct. It is written in medical language and may contain abbreviations or verbiage that are unfamiliar.    AMELIA Mirza, MS, PhD, [facility]  Emergency Medicine  [employer]: [phone]     Leodan Lema, CNP  05/03/24 4200     [Nl] : Integumentary

## 2024-05-06 ENCOUNTER — APPOINTMENT (OUTPATIENT)
Dept: PSYCHIATRY | Facility: CLINIC | Age: 39
End: 2024-05-06
Payer: MEDICAID

## 2024-05-06 ENCOUNTER — OUTPATIENT (OUTPATIENT)
Dept: OUTPATIENT SERVICES | Facility: HOSPITAL | Age: 39
LOS: 1 days | End: 2024-05-06
Payer: MEDICAID

## 2024-05-06 DIAGNOSIS — F32.A DEPRESSION, UNSPECIFIED: ICD-10-CM

## 2024-05-06 DIAGNOSIS — F41.9 ANXIETY DISORDER, UNSPECIFIED: ICD-10-CM

## 2024-05-06 DIAGNOSIS — F41.1 GENERALIZED ANXIETY DISORDER: ICD-10-CM

## 2024-05-06 PROCEDURE — 99214 OFFICE O/P EST MOD 30 MIN: CPT | Mod: 95

## 2024-05-06 NOTE — HISTORY OF PRESENT ILLNESS
[Suicidal Behavior/Ideation] : suicidal behavior/ideation [FreeTextEntry1] : Pt seen in follow-up.  She had called last week, saying she had an episode of nausea and vomiting that landed her in the ER.  She was concerned it was due to the Prozac, which seemed very unlikely to me. IN the end, it was some sort of virus going through her residence.  The sx resolved after a day, and she has continued on the Prozac.  She feels it is helping.  Rates her depression now at about a 6 out of 10 - with 10 being how she felt when she was admitted to the hospital a month ago.  Trend is slowly toward better.  Pt is still binge-eating, and this might be helped with a higher dose of Prozac.  We will have to wait, however, until the Amitriptyline issue is resolved.  Pt spoke with her neurologist, and they are slowly tapering off of it.  It will take several more weeks.  Topamax will be started instead, and that may help to curb appetite.  Pt still feels very fatigued.  In the past, she though perhaps this was due to Lamictal or Lexapro, but it continues long after those medications were stopped.  Seems more likely to be a medical issue, though perhaps it will lessen without the Amitriptyline.  We will give more time on Prozac at this dose. [FreeTextEntry2] : First treatment for depression and suicidality in late teens Approximately 8 IPP stays since then - the last in April 2024. Chronic suicidal thinking, with hx of past attempts.  Reportedly drank bleach/Pine-sol after father's death in 11/2022; prior to this patient has history of 3 remote attempts in her teens - drinking acetone, drinking hydrogen peroxide, and half bottle of ibuprofen. Pt has hx of ER visit for pseudo-seizures. [FreeTextEntry3] : Celexa, Cymbalta, Buspar, Zyprexa, Gabapentin (hives) - none were effective. Lexapro - ineffective Lamictal - fatigue

## 2024-05-06 NOTE — PHYSICAL EXAM
[Average] : average [Cooperative] : cooperative [Clear] : clear [Linear/Goal Directed] : linear/goal directed [None] : none [None Reported] : none reported [WNL] : within normal limits [FreeTextEntry8] : "better" [de-identified] : increased range persists [de-identified] : fair [de-identified] : fair

## 2024-05-06 NOTE — SOCIAL HISTORY
[FreeTextEntry1] : Father  in .  Pt lives with mother in assisted living ([facility]). Pt did not finish high school due to severe depression.

## 2024-05-06 NOTE — DISCUSSION/SUMMARY
[FreeTextEntry1] : Pt with chronic depression and anxiety.  Slow improvement continues with Prozac.  Will hold off on any dosage adjustment until the Amitriptyline is tapered away.

## 2024-05-07 DIAGNOSIS — F41.9 ANXIETY DISORDER, UNSPECIFIED: ICD-10-CM

## 2024-05-07 DIAGNOSIS — F32.A DEPRESSION, UNSPECIFIED: ICD-10-CM

## 2024-05-07 DIAGNOSIS — F41.1 GENERALIZED ANXIETY DISORDER: ICD-10-CM

## 2024-05-08 ENCOUNTER — APPOINTMENT (OUTPATIENT)
Dept: PSYCHIATRY | Facility: CLINIC | Age: 39
End: 2024-05-08

## 2024-05-08 ENCOUNTER — OUTPATIENT (OUTPATIENT)
Dept: OUTPATIENT SERVICES | Facility: HOSPITAL | Age: 39
LOS: 1 days | End: 2024-05-08
Payer: MEDICAID

## 2024-05-08 DIAGNOSIS — F41.1 GENERALIZED ANXIETY DISORDER: ICD-10-CM

## 2024-05-08 DIAGNOSIS — F32.A DEPRESSION, UNSPECIFIED: ICD-10-CM

## 2024-05-08 DIAGNOSIS — F41.0 PANIC DISORDER [EPISODIC PAROXYSMAL ANXIETY]: ICD-10-CM

## 2024-05-08 DIAGNOSIS — F41.9 ANXIETY DISORDER, UNSPECIFIED: ICD-10-CM

## 2024-05-08 PROCEDURE — 90832 PSYTX W PT 30 MINUTES: CPT

## 2024-05-08 NOTE — REASON FOR VISIT
[Patient] : Patient [FreeTextEntry1] : Patient is a 39-year-old female seen for a follow-up therapy session, 1:20 PM to 1:55 PM.

## 2024-05-08 NOTE — PHYSICAL EXAM
[Cooperative] : cooperative [Euthymic] : euthymic [Full] : full [Clear] : clear [Linear/Goal Directed] : linear/goal directed [None] : none [None Reported] : none reported [Average] : average [WNL] : within normal limits [de-identified] : Fair
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
## 2024-05-09 DIAGNOSIS — F41.9 ANXIETY DISORDER, UNSPECIFIED: ICD-10-CM

## 2024-05-09 DIAGNOSIS — F32.A DEPRESSION, UNSPECIFIED: ICD-10-CM

## 2024-05-13 ENCOUNTER — NON-APPOINTMENT (OUTPATIENT)
Age: 39
End: 2024-05-13

## 2024-05-13 ENCOUNTER — APPOINTMENT (OUTPATIENT)
Dept: PODIATRY | Facility: CLINIC | Age: 39
End: 2024-05-13
Payer: MEDICAID

## 2024-05-13 ENCOUNTER — OUTPATIENT (OUTPATIENT)
Dept: OUTPATIENT SERVICES | Facility: HOSPITAL | Age: 39
LOS: 1 days | End: 2024-05-13
Payer: MEDICAID

## 2024-05-13 DIAGNOSIS — B35.1 TINEA UNGUIUM: ICD-10-CM
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
## 2024-05-13 DIAGNOSIS — L85.3 XEROSIS CUTIS: ICD-10-CM

## 2024-05-13 DIAGNOSIS — Z00.00 ENCOUNTER FOR GENERAL ADULT MEDICAL EXAMINATION WITHOUT ABNORMAL FINDINGS: ICD-10-CM

## 2024-05-13 DIAGNOSIS — E11.9 TYPE 2 DIABETES MELLITUS WITHOUT COMPLICATIONS: ICD-10-CM

## 2024-05-13 PROCEDURE — 99213 OFFICE O/P EST LOW 20 MIN: CPT

## 2024-05-13 PROCEDURE — G2211 COMPLEX E/M VISIT ADD ON: CPT | Mod: NC,1L

## 2024-05-14 ENCOUNTER — APPOINTMENT (OUTPATIENT)
Dept: ENDOCRINOLOGY | Facility: CLINIC | Age: 39
End: 2024-05-14
Payer: MEDICAID

## 2024-05-14 ENCOUNTER — NON-APPOINTMENT (OUTPATIENT)
Age: 39
End: 2024-05-14

## 2024-05-14 VITALS
BODY MASS INDEX: 33.8 KG/M2 | HEIGHT: 64 IN | WEIGHT: 198 LBS | RESPIRATION RATE: 91 BRPM | OXYGEN SATURATION: 98 % | DIASTOLIC BLOOD PRESSURE: 90 MMHG | SYSTOLIC BLOOD PRESSURE: 140 MMHG

## 2024-05-14 DIAGNOSIS — E10.9 TYPE 1 DIABETES MELLITUS W/OUT COMPLICATIONS: ICD-10-CM

## 2024-05-14 PROCEDURE — 99214 OFFICE O/P EST MOD 30 MIN: CPT

## 2024-05-14 NOTE — REVIEW OF SYSTEMS
[Recent Weight Gain (___ Lbs)] : recent weight gain: [unfilled] lbs [Shortness Of Breath] : shortness of breath [SOB on Exertion] : shortness of breath on exertion [Depression] : depression [Anxiety] : anxiety [Stress] : stress [As Noted in HPI] : as noted in HPI [Fatigue] : no fatigue [Decreased Appetite] : appetite not decreased [Recent Weight Loss (___ Lbs)] : no recent weight loss [Visual Field Defect] : no visual field defect [Blurred Vision] : no blurred vision [Dysphagia] : no dysphagia [Dysphonia] : no dysphonia [Palpitations] : no palpitations [Cough] : no cough [Orthopnea] : no orthopnea [PND] : no Paroxysmal Nocturnal Dyspnea [Nausea] : no nausea [Constipation] : no constipation [Vomiting] : no vomiting [Diarrhea] : no diarrhea [Nocturia] : no nocturia [Acanthosis] : no acanthosis  [Acne] : no acne [Dry Skin] : no dry skin [Hirsutism] : no hirsutism [Headaches] : no headaches [Tremors] : no tremors [Cold Intolerance] : no cold intolerance [Heat Intolerance] : no heat intolerance

## 2024-05-14 NOTE — DATA REVIEWED
[FreeTextEntry1] : previous data and record reviewed  1/15/21:  hba1c 8.7% glucose 203  LDL 116crea 0.7  AST 15  ALT 18  hb 13.6  6/1/21: Hba1c 8.9% glucose 283  crea0.62   10/4/21: HBA1c 10.8% glucose 190   TSH 1.310  ft4 1.22 ALT 56   9/30/22: HBA1c 9.9%   hb 8.3 crea 0.63    alk phos 138  alb/crea  39  25 vit D 25.2 TSH 1.02  1/2023:A1c 9.8%  hb 9.6  glucose 355 crea 0.63   AST 29  ALT 52     ACR 24  TSH 0.791 fructosamine 440 b12 715   6/2023: A1c 10.7 % glucose 276 crea 0.7 gfr 113 AST 27 ALT 61  3/2024: glucose 576 crea 0.77   AST 52   alk viv713   TSh 2.27

## 2024-05-14 NOTE — ASSESSMENT
[Diabetes Foot Care] : diabetes foot care [Long Term Vascular Complications] : long term vascular complications of diabetes [Carbohydrate Consistent Diet] : carbohydrate consistent diet [Importance of Diet and Exercise] : importance of diet and exercise to improve glycemic control, achieve weight loss and improve cardiovascular health [Exercise/Effect on Glucose] : exercise/effect on glucose [Hypoglycemia Management] : hypoglycemia management [Action and use of Insulin] : action and use of short and long-acting insulin [Self Monitoring of Blood Glucose] : self monitoring of blood glucose [Insulin Self-Administration] : insulin self-administration [Injection Technique, Storage, Sharps Disposal] : injection technique, storage, and sharps disposal [Weight Loss] : weight loss [FreeTextEntry1] : 38 year old patient with type 1 DM on insulin pump presents for follow up, also has evidence of insulin resistance and now with JOEL    #poorly controlled type 1 DM on insulin pump Medtronic 630 G - pattern of hyperglycemia pre and post meals , attributed to noncompliance with diet , per mother and Ania , she was not watching her carbs and eating snack also not always taking boluses with snacks ,has been in and out of hospital with changes to her psych meds  changed rates and I: C  as below  - monitor lipids , lft  - discussed weight loss and possible use of GLP1 , she prefers to hold off , will be started on topamax soon  - following with podiatry/ psychiatry   Diabetes Therapy Regimen   Date: 05/14/2024   Basal Rate Start Time: 12 am - Basal: 2.5 units/hour  ... changed to 2.75 Start Time: 12pm Basal: 3.0 units/hour        .... changed to 3.5  Carb Ratios Start Time: 12am Carb Ratios:  10 grams/unit.... 1:8 g  Start Time: 7 am Carb Ratios: 7 grams/unit.... 1: 6 g  Start Time: 2pm Carb Ratios: 7 grams/unit      ....1:6 g  Sensitvity Start Time: Sensitivity:  40 mg/dL/U          ....1:30 BG Target Ranges Start Time: BG Target Ranges:  120 mg/dL

## 2024-05-15 ENCOUNTER — OUTPATIENT (OUTPATIENT)
Dept: OUTPATIENT SERVICES | Facility: HOSPITAL | Age: 39
LOS: 1 days | End: 2024-05-15
Payer: MEDICAID

## 2024-05-15 ENCOUNTER — APPOINTMENT (OUTPATIENT)
Dept: OPHTHALMOLOGY | Facility: CLINIC | Age: 39
End: 2024-05-15
Payer: MEDICAID

## 2024-05-15 DIAGNOSIS — H53.8 OTHER VISUAL DISTURBANCES: ICD-10-CM

## 2024-05-15 DIAGNOSIS — H35.373 PUCKERING OF MACULA, BILATERAL: ICD-10-CM

## 2024-05-15 DIAGNOSIS — E11.9 TYPE 2 DIABETES MELLITUS WITHOUT COMPLICATIONS: ICD-10-CM

## 2024-05-15 DIAGNOSIS — H04.129 DRY EYE SYNDROME OF UNSPECIFIED LACRIMAL GLAND: ICD-10-CM

## 2024-05-15 PROCEDURE — 99213 OFFICE O/P EST LOW 20 MIN: CPT

## 2024-05-15 PROCEDURE — 92134 CPTRZ OPH DX IMG PST SGM RTA: CPT | Mod: 26

## 2024-05-15 PROCEDURE — 92134 CPTRZ OPH DX IMG PST SGM RTA: CPT

## 2024-05-17 ENCOUNTER — APPOINTMENT (OUTPATIENT)
Dept: PSYCHIATRY | Facility: CLINIC | Age: 39
End: 2024-05-17

## 2024-05-17 ENCOUNTER — OUTPATIENT (OUTPATIENT)
Dept: OUTPATIENT SERVICES | Facility: HOSPITAL | Age: 39
LOS: 1 days | End: 2024-05-17
Payer: MEDICAID

## 2024-05-17 DIAGNOSIS — F32.A DEPRESSION, UNSPECIFIED: ICD-10-CM

## 2024-05-17 DIAGNOSIS — F41.9 ANXIETY DISORDER, UNSPECIFIED: ICD-10-CM

## 2024-05-17 DIAGNOSIS — F41.1 GENERALIZED ANXIETY DISORDER: ICD-10-CM

## 2024-05-17 DIAGNOSIS — F41.0 PANIC DISORDER [EPISODIC PAROXYSMAL ANXIETY]: ICD-10-CM

## 2024-05-17 PROCEDURE — 90832 PSYTX W PT 30 MINUTES: CPT

## 2024-05-17 NOTE — PHYSICAL EXAM
[Cooperative] : cooperative [Depressed] : depressed [Anxious] : anxious [Full] : full [Clear] : clear [Linear/Goal Directed] : linear/goal directed [None] : none [None Reported] : none reported [Average] : average [WNL] : within normal limits [de-identified] : Fair

## 2024-05-17 NOTE — REASON FOR VISIT
[Patient] : Patient [FreeTextEntry1] : Patient is a 39-year-old female seen for a follow-up therapy session, 1:30 PM to 2:00 PM.

## 2024-05-17 NOTE — PLAN
[[facility]] : [facility]  [Supportive Therapy] : Supportive Therapy [Recommended Frequency of Visits: ____] : Recommended frequency of visits: [unfilled] [Return in ____ week(s)] : Return in [unfilled] week(s) [FreeTextEntry2] : Goal #1" I want to feel better and manage my stress."   Objective #1 Patient will keep regular therapy sessions, identify triggers of mood disturbance or emotional dysregulation.   Objective #2 Patient will learn and utilize positive coping, bereavement, stress management, and CBT/DBT methods, including DBT, bereavement or IOP groups.     Goal #2 Manage medical issues. " I want to be healthy."  Obj#1   Patient will comply with all medical and psychiatric treatment recommendations.      [de-identified] : Patient reports that her mood and anxiety have been worse. She complained of somatic symptoms such as dizziness, palpitations, and lightheadedness. She reports seeing her Endocrinologist this past Tuesday and had a full-blown panic attack and her blood pressure was high when she got there. Reinforced her efforts to calm herself with deep breathing. She reports that her insulin dosage was increased. She is not aware of any triggers. Helped her recognize thoughts that trigger or impact her anxiety, such as worry thoughts related to not feeling well and constantly trying to figure out is wrong with her or what went wrong. She wanted to know if it's normal for her mood to be "up and down' so frequently, and if she is feeling this way because she was not weaned off of Lexapro while in IPP, even though she was started on Prozac.  She reports that she continues to take 20 mg of Prozac. She has a follow-up appointment with her psychiatrist, Dr. Curiel, on 5/30. She denies any suicide thoughts. She reports that she does not really want to be in IPP again since she will be in bed there all day, like she is in bed now almost all day. She reports that it has been difficult for her to get out of bed in the morning but has been making it to breakfast. She reports that she then needs to nap. Spoke about CBT methods to help her manage her anxiety, mood, and negative or worry thoughts, such as practicing positive self-talk and thought stopping methods. She reports that coloring would be a good distraction. Patient accepts referral to the DBT skills group which has not begun yet. She was informed that her name was put on the DBT skills group referral list. Addressed ongoing conflict with her male friend, Jeferson, limits and boundaries to set. She reports feeling obligated to explain herself or defend her mother. She will try to walk away or not engage.  [FreeTextEntry1] : Patient will focus on positive coping, stress management, and CBT methods. She will continue medication regimen and follow-up with therapy on 5/22.

## 2024-05-18 DIAGNOSIS — F41.0 PANIC DISORDER [EPISODIC PAROXYSMAL ANXIETY]: ICD-10-CM

## 2024-05-18 DIAGNOSIS — F41.1 GENERALIZED ANXIETY DISORDER: ICD-10-CM

## 2024-05-18 DIAGNOSIS — F32.A DEPRESSION, UNSPECIFIED: ICD-10-CM

## 2024-05-20 PROBLEM — B35.1 ONYCHOMYCOSIS: Status: ACTIVE | Noted: 2018-10-22

## 2024-05-20 PROBLEM — L85.3 DRY SKIN: Status: ACTIVE | Noted: 2017-10-16

## 2024-05-20 NOTE — HISTORY OF PRESENT ILLNESS
[Sneakers] : reema [Walker] : a walker [FreeTextEntry1] : Presents for DM foot check, last A1c was 10.3 in 9/23. Complains of occasional peripheral neuropathy symptoms. No other pedal complaints today.

## 2024-05-20 NOTE — ASSESSMENT
[FreeTextEntry1] : -Patient seen and evaluated in clinic today with all questions and concerns addressed and answered. -Discussed with patient about importance of tight glycemic control, good pedal hygiene and daily pedal checks.  -Recommendation for compression sock use -Debrided hyperkeratotic tissue as noted above with #15 blade w/o incident -RTC 6 months  -Patient to return to clinic in 6 months

## 2024-05-20 NOTE — PHYSICAL EXAM
[General Appearance - Alert] : alert [General Appearance - In No Acute Distress] : in no acute distress [Ankle Swelling (On Exam)] : present [Ankle Swelling Bilaterally] : bilaterally  [Ankle Swelling On The Right] : mild [Skin Turgor] : normal skin turgor [Skin Lesions] : no skin lesions [Varicose Veins Of Lower Extremities] : not present [] : not present [Delayed in the Right Toes] : capillary refills normal in right toes [Delayed in the Left Toes] : capillary refills normal in the left toes [FreeTextEntry3] : DP/PT pulses palpable [de-identified] : Varus deformity, rigid left foot Extensus hallux, rigid right foot [Foot Ulcer] : no foot ulcer [Diminished Throughout Right Foot] : normal sensation with monofilament testing throughout right foot [Diminished Throughout Left Foot] : normal sensation with monofilament testing throughout left foot [FreeTextEntry1] : Light and gross touch sensation intact

## 2024-05-21 ENCOUNTER — NON-APPOINTMENT (OUTPATIENT)
Age: 39
End: 2024-05-21

## 2024-05-22 ENCOUNTER — APPOINTMENT (OUTPATIENT)
Dept: PSYCHIATRY | Facility: CLINIC | Age: 39
End: 2024-05-22

## 2024-05-24 ENCOUNTER — NON-APPOINTMENT (OUTPATIENT)
Age: 39
End: 2024-05-24

## 2024-05-29 NOTE — BH PATIENT PROFILE - FALL HARM RISK - CONCLUSION
[facility]   HISTORY AND PHYSICAL    ADMISSION DATE:  5/28/2024    PRIMARY CARE PHYSICIAN:  Muna Nicholson MD    CODE STATUS:  Full Resuscitation    CHIEF COMPLAINT: Seizure      HISTORY OF PRESENT ILLNESS:    Harley Domínguez is a 65 year old male  with hx of  DM, HTN, CAD s/p CABG 2017, AS s/p AVR 2017, HLD, COPD, Alcohol abuse with hx alcohol withdrawal seizure 2020, Afib/fl on Eliquis, HFpEF presenting with seizure activity.  History was obtained from reviewing chart in addition to calling patient's wife over the phone.  Patient today had a witnessed seizure at home.  Upon presented Tatian to the emergency room he had an additional seizure.  Received 2 mg of Ativan and Keppra 1500 mg IV.  He was admitted head CT was negative.  He was admitted to the hospital with neurology on consult.  Patient was advanced evaluated by critical care who did not feel that he needed ICU bed.    Patient was seen and examined this morning.  He was quite somnolent during exam.  He did verbalize he had no complaints.  Was confused.  Patient's wife was contacted additional history was obtained from her.  Per wife he has not been taking his Keppra.  Per wife he also drinks beer occasionally.  Does not drink it daily.  Patient has been evaluated by neurology this morning who is recommending Continue Keppra for now.    PAST MEDICAL HISTORY:    Past Medical History:   Diagnosis Date    COPD (chronic obstructive pulmonary disease)  (CMD)     Coronary artery disease     Essential (primary) hypertension     H/O ETOH abuse     High cholesterol     Unspecified viral hepatitis C without hepatic coma        SURGICAL HISTORY:    Past Surgical History:   Procedure Laterality Date    Cardiac catherization      Cardiac valve replacement      Coronary artery bypass graft  2017       MEDICATIONS PRIOR TO ADMISSION:    Medications Prior to Admission   Medication Sig Dispense Refill    folic acid (FOLATE) 1 MG  tablet Take 1 tablet by mouth daily. Do not start before February 2, 2024. 30 tablet 1    levETIRAcetam (KEPPRA) 750 MG tablet Take 1 tablet by mouth every 12 hours. 60 tablet 5    thiamine (VITAMIN B1) 100 MG tablet Take 1 tablet by mouth daily. Do not start before February 2, 2024. 30 tablet 5    Blood Glucose Monitoring Suppl (OneTouch Verio) w/Device Kit 1 kit  in the morning and 1 kit at noon and 1 kit in the evening. 1 kit 0    blood glucose test strip Test blood sugar three times daily. 1 each 5    OneTouch Delica Lancets 30G Misc 100 each  in the morning and 100 each at noon and 100 each in the evening. 1 each 5    metFORMIN (GLUCOPHAGE) 500 MG tablet Take 1 tablet by mouth in the morning and 1 tablet in the evening. Take with meals. 60 tablet 1    apixaBAN (ELIQUIS) 5 MG Tab Take 1 tablet by mouth every 12 hours. 60 tablet 0    aspirin (ECOTRIN) 81 MG EC tablet Take 1 tablet by mouth daily. Do not start before January 7, 2024.      atorvastatin (LIPITOR) 20 MG tablet Take 1 tablet by mouth nightly. 30 tablet 0    ipratropium-albuterol (DUONEB) 0.5-2.5 (3) MG/3ML nebulizer solution Take 3 mLs by nebulization Every 4 hours as needed for Shortness of Breath or Wheezing. 270 mL 0    umeclidinium-vilanterol (ANORO ELLIPTA) 62.5-25 MCG/ACT inhaler Inhale 1 puff into the lungs daily. Do not start before January 7, 2024. 1 each 1    metoPROLOL tartrate (LOPRESSOR) 50 MG tablet Take 1 tablet by mouth every 12 hours. 60 tablet 0    dilTIAZem (Cardizem LA) 360 MG 24 hr tablet Take 1 tablet by mouth daily. 30 tablet 0    albuterol 108 (90 Base) MCG/ACT inhaler Inhale 2 puffs into the lungs every 4 hours as needed for Shortness of Breath or Wheezing.          ALLERGIES:    ALLERGIES:   Allergen Reactions    Penicillin G Other (See Comments) and RASH     Pt states he turns pink          SOCIAL HISTORY:    Social History     Tobacco Use    Smoking status: Every Day     Current packs/day: 1.00     Types: Cigarettes      Passive exposure: Past    Smokeless tobacco: Never   Vaping Use    Vaping status: Former    Substances: Nicotine, THC   Substance Use Topics    Alcohol use: Not Currently     Comment: 1 bottle of wine/day, some liquor daily (1-2 shots)    Drug use: Yes     Types: Marijuana       FAMILY HISTORY:    Family History   Problem Relation Age of Onset    Depression Father        REVIEW OF SYSTEMS:    See HPI for pertinent positives    PHYSICAL EXAM:    VITAL SIGNS:    Vital Last Value 24 Hour Range   Temperature 97.3 °F (36.3 °C) (05/29/24 1100) Temp  Min: 96.8 °F (36 °C)  Max: 97.6 °F (36.4 °C)   Pulse 86 (05/29/24 1100) Pulse  Min: 86  Max: 146   Respiratory (!) 22 (05/29/24 1100) Resp  Min: 20  Max: 38   Non-Invasive  Blood Pressure (!) 143/89 (05/29/24 1100) BP  Min: 118/98  Max: 173/155   Pulse Oximetry 96 % (05/29/24 1100) SpO2  Min: 94 %  Max: 98 %     Vital Today Admitted   Weight 79.3 kg (174 lb 13.2 oz) (05/29/24 0500) Weight: 79.3 kg (174 lb 13.2 oz) (05/29/24 0500)   Height N/A Height: 5' 10\" (177.8 cm) (05/29/24 0300)   BMI N/A BMI (Calculated): 25.08 (05/29/24 0500)     Physical Exam:   General appearance: somnolent but arrousable  Head: Normocephalic, without obvious abnormality, atraumatic  Eyes: Conjunctivae/sclerae normal.  Neck: supple, symmetrical, trachea midline  Lungs: clear to auscultation bilaterally  Heart: regular rate and rhythm, S1, S2 normal, no murmur, click, rub or gallop  Abdomen: Soft, non-tender; bowel sounds normal; No rebound, Nondistended  Extremities: No edema  Skin: Skin discoloration  Neurologic: 5 out of 5 muscle strength.  Following commands.  Disoriented    LABORATORY DATA:      Labs:  Recent Labs   Lab 05/29/24 0630 05/28/24 2030   WBC 9.1 12.5*   HGB 15.7 17.5*   HCT 48.2 51.9*    187     Recent Labs   Lab 05/29/24 0630 05/28/24 2119 05/28/24  2030   SODIUM 139  --  140   POTASSIUM 3.6  --  4.2   CHLORIDE 104  --  102   CO2 29  --  29   BUN 11  --  12   CREATININE  0.79  --  0.87   ANIONGAP 10  --  13   CALCIUM 8.5  --  8.9   ALBUMIN 3.2*  --  3.8   BILIRUBIN 0.6  --  0.7   ALKPT 72  --  98   AST 19  --  25   GLUCOSE 94  --  99   MG 1.7  --  1.7   LACTA  --  0.9  --    PT  --   --  11.4   PTT  --   --  28   INR  --   --  1.1         MICROBIOLOGY    Specimen Description (no units)   Date Value   11/01/2020 CEREBROSPINAL FLUID CEREBROSPINAL FLUID   11/01/2020 BLOOD lac      CULTURE (no units)   Date Value   11/01/2020 NO GROWTH 2 DAYS.   11/01/2020 NO GROWTH 5 DAYS.       IMAGING STUDIES:    CT HEAD WO CONTRAST   Final Result      No CT evidence of acute intracranial hemorrhage. No abnormal extra-axial   fluid collection. Ventricles and cortical sulci are normal in size and   morphology. No cortical edema to indicate a vascular distribution acute   ischemic cortical infarction. No definite mass on this noncontrast study.   No mass effect or midline shift.       Visualized portions of the sinuses and bilateral mastoid air cells are   clear. No skull fracture evident.      Electronically Signed by: PONCHO DURON M.D.    Signed on: 5/28/2024 9:07 PM    Workstation ID: [id number]              ASSESSMENT/PLAN:    Seizure   Etiology unclear when she is suspect provoked seizures from alcohol withdrawal per neuro  Continue Keppra  Head CT negative  Neurochecks  Seizure precautions  Neuro on consult    History of regular alcohol usage  Monitor  Alcohol cessation recommended    History of CAD status post CABG in 2017  Aortic stenosis status post AVR in 2017  A-fib on Eliquis  Heart failure with preserved EF  History of hypertension  History of diabetes  History of hyperlipidemia  -Continue home medications      ELIJAH: 24 to 48 hours    PCP Muna Nicholson MD      Code Status: Full Resuscitation       Fall with Harm Risk

## 2024-05-30 ENCOUNTER — APPOINTMENT (OUTPATIENT)
Dept: PSYCHIATRY | Facility: CLINIC | Age: 39
End: 2024-05-30

## 2024-05-30 ENCOUNTER — EMERGENCY (EMERGENCY)
Facility: HOSPITAL | Age: 39
LOS: 0 days | Discharge: HOME | End: 2024-05-30
Attending: EMERGENCY MEDICINE
Payer: MEDICAID

## 2024-05-30 VITALS
HEART RATE: 97 BPM | DIASTOLIC BLOOD PRESSURE: 88 MMHG | OXYGEN SATURATION: 99 % | SYSTOLIC BLOOD PRESSURE: 187 MMHG | RESPIRATION RATE: 16 BRPM | WEIGHT: 179.9 LBS | TEMPERATURE: 99 F | HEIGHT: 64 IN

## 2024-05-30 DIAGNOSIS — E11.9 TYPE 2 DIABETES MELLITUS WITHOUT COMPLICATIONS: ICD-10-CM

## 2024-05-30 DIAGNOSIS — B34.9 VIRAL INFECTION, UNSPECIFIED: ICD-10-CM

## 2024-05-30 DIAGNOSIS — Z88.2 ALLERGY STATUS TO SULFONAMIDES: ICD-10-CM

## 2024-05-30 DIAGNOSIS — F32.A DEPRESSION, UNSPECIFIED: ICD-10-CM

## 2024-05-30 DIAGNOSIS — Z88.7 ALLERGY STATUS TO SERUM AND VACCINE: ICD-10-CM

## 2024-05-30 DIAGNOSIS — H92.02 OTALGIA, LEFT EAR: ICD-10-CM

## 2024-05-30 DIAGNOSIS — Z88.8 ALLERGY STATUS TO OTHER DRUGS, MEDICAMENTS AND BIOLOGICAL SUBSTANCES STATUS: ICD-10-CM

## 2024-05-30 DIAGNOSIS — Z88.1 ALLERGY STATUS TO OTHER ANTIBIOTIC AGENTS STATUS: ICD-10-CM

## 2024-05-30 PROCEDURE — 99283 EMERGENCY DEPT VISIT LOW MDM: CPT

## 2024-05-30 RX ORDER — FLUTICASONE PROPIONATE 50 MCG
1 SPRAY, SUSPENSION NASAL
Qty: 1 | Refills: 0
Start: 2024-05-30 | End: 2024-06-13

## 2024-05-30 NOTE — ED ADULT TRIAGE NOTE - CHIEF COMPLAINT QUOTE
Patient arrived from [facility] c/o Left ear ;pain and cough  x 8 days denies fevers chills. Denies SOB CP fevers and chills

## 2024-05-30 NOTE — ED ADULT NURSE NOTE - CHIEF COMPLAINT QUOTE
Patient arrived from [facility] c/o Left ear ;pain and cough  x 8 days denies fevers chills. Denies SOB CP fevers and chills

## 2024-05-30 NOTE — ED ADULT NURSE NOTE - NSFALLUNIVINTERV_ED_ALL_ED
Bed/Stretcher in lowest position, wheels locked, appropriate side rails in place/Call bell, personal items and telephone in reach/Instruct patient to call for assistance before getting out of bed/chair/stretcher/Non-slip footwear applied when patient is off stretcher/[place] to call system/Physically safe environment - no spills, clutter or unnecessary equipment/Purposeful proactive rounding/Room/bathroom lighting operational, light cord in reach

## 2024-05-30 NOTE — ED PROVIDER NOTE - PHYSICAL EXAMINATION
wd/wn  nad  OP wnl  no cervical LAD  R TM clear  L TM + fluid, + light reflex intact, no erythema or swelling  rrr s1s2 wnl  cta b/l  nt/nd + BS  aao X3

## 2024-05-30 NOTE — ED ADULT NURSE NOTE - OBJECTIVE STATEMENT
Patient arrived from [facility] c/o Left ear ;pain and cough  x 8 days denies fevers chills. Denies SOB CP fevers and chills

## 2024-05-30 NOTE — ED PROVIDER NOTE - PATIENT PORTAL LINK FT
You can access the FollowMyHealth Patient Portal offered by [facility] by registering at the following website: [email or web address]. By joining [employer]’s FollowMyHealth portal, you will also be able to view your health information using other applications (apps) compatible with our system.

## 2024-05-30 NOTE — ED PROVIDER NOTE - OBJECTIVE STATEMENT
38 yo woman w/ DM, depression here w/ 1 week of nasal congestion, cough, sore throat and L ear pain  seen in UC 7 days prior w/ negative strep (no abx at that time)  states symptoms continued  no fevers  no n/v/d  no cp, sob, lewis, palpitations  taking tylenol w/ some improvement

## 2024-05-31 ENCOUNTER — APPOINTMENT (OUTPATIENT)
Dept: OTOLARYNGOLOGY | Facility: CLINIC | Age: 39
End: 2024-05-31
Payer: MEDICAID

## 2024-05-31 DIAGNOSIS — H60.502 UNSPECIFIED ACUTE NONINFECTIVE OTITIS EXTERNA, LEFT EAR: ICD-10-CM

## 2024-05-31 PROCEDURE — 99204 OFFICE O/P NEW MOD 45 MIN: CPT

## 2024-05-31 RX ORDER — NEOMYCIN SULFATE, POLYMYXIN B SULFATE, HYDROCORTISONE 3.5; 10000; 1 MG/ML; [USP'U]/ML; MG/ML
1 SOLUTION/ DROPS AURICULAR (OTIC)
Qty: 1 | Refills: 0 | Status: ACTIVE | COMMUNITY
Start: 2024-05-31 | End: 1900-01-01

## 2024-05-31 NOTE — ASSESSMENT
[FreeTextEntry1] : Left ear OE.  Ear culture swab taken.  Prescribe neomycin / polymixin ear drops: 5 drops BID to left ear for 2 weeks.  Follow up with me in 2 weeks.

## 2024-05-31 NOTE — PHYSICAL EXAM
[de-identified] : Left EAC edematous with otorrhea - cultured. right EAC clear [Midline] : trachea located in midline position [Normal] : assessment of respiratory effort is normal

## 2024-05-31 NOTE — HISTORY OF PRESENT ILLNESS
[de-identified] : Patient presents today c/o ear infections, cough and runny nose. States that she is experiencing ear infections. Went to ED yesterday for burning in ear, into neck and the jaw. Noticed left side of face is swollen. Told at ED that she has fluid in ear. Ear is draining. Did not notice any color to it. Also has cough and runny nose. Prescribed Fluticasone.  She has DM.

## 2024-05-31 NOTE — REASON FOR VISIT
[Initial Evaluation] : an initial evaluation for [FreeTextEntry2] : ear infections, cough and runny nose

## 2024-06-03 ENCOUNTER — APPOINTMENT (OUTPATIENT)
Dept: PSYCHIATRY | Facility: CLINIC | Age: 39
End: 2024-06-03

## 2024-06-03 LAB — EAR NOSE AND THROAT CULTURE: ABNORMAL

## 2024-06-06 RX ORDER — AMITRIPTYLINE HYDROCHLORIDE 25 MG/1
25 TABLET, FILM COATED ORAL
Qty: 49 | Refills: 0 | Status: DISCONTINUED | COMMUNITY
Start: 2024-04-24 | End: 2024-06-06

## 2024-06-07 RX ORDER — BLOOD SUGAR DIAGNOSTIC
STRIP MISCELLANEOUS
Qty: 3 | Refills: 6 | Status: ACTIVE | COMMUNITY
Start: 2023-11-15 | End: 1900-01-01

## 2024-06-10 ENCOUNTER — APPOINTMENT (OUTPATIENT)
Dept: INTERNAL MEDICINE | Facility: CLINIC | Age: 39
End: 2024-06-10
Payer: MEDICAID

## 2024-06-10 ENCOUNTER — RX RENEWAL (OUTPATIENT)
Age: 39
End: 2024-06-10

## 2024-06-10 ENCOUNTER — OUTPATIENT (OUTPATIENT)
Dept: OUTPATIENT SERVICES | Facility: HOSPITAL | Age: 39
LOS: 1 days | End: 2024-06-10
Payer: MEDICAID

## 2024-06-10 VITALS
TEMPERATURE: 97.9 F | WEIGHT: 196 LBS | OXYGEN SATURATION: 98 % | SYSTOLIC BLOOD PRESSURE: 156 MMHG | BODY MASS INDEX: 33.46 KG/M2 | HEART RATE: 88 BPM | HEIGHT: 64 IN | DIASTOLIC BLOOD PRESSURE: 95 MMHG

## 2024-06-10 VITALS — DIASTOLIC BLOOD PRESSURE: 81 MMHG | SYSTOLIC BLOOD PRESSURE: 146 MMHG

## 2024-06-10 DIAGNOSIS — E11.9 TYPE 2 DIABETES MELLITUS W/OUT COMPLICATIONS: ICD-10-CM

## 2024-06-10 DIAGNOSIS — H92.02 OTALGIA, LEFT EAR: ICD-10-CM

## 2024-06-10 DIAGNOSIS — Z00.00 ENCOUNTER FOR GENERAL ADULT MEDICAL EXAMINATION W/OUT ABNORMAL FINDINGS: ICD-10-CM

## 2024-06-10 DIAGNOSIS — E66.9 OBESITY, UNSPECIFIED: ICD-10-CM

## 2024-06-10 DIAGNOSIS — I10 ESSENTIAL (PRIMARY) HYPERTENSION: ICD-10-CM

## 2024-06-10 DIAGNOSIS — E28.2 POLYCYSTIC OVARIAN SYNDROME: ICD-10-CM

## 2024-06-10 DIAGNOSIS — Z00.00 ENCOUNTER FOR GENERAL ADULT MEDICAL EXAMINATION WITHOUT ABNORMAL FINDINGS: ICD-10-CM

## 2024-06-10 PROCEDURE — 99214 OFFICE O/P EST MOD 30 MIN: CPT

## 2024-06-10 PROCEDURE — G2211 COMPLEX E/M VISIT ADD ON: CPT | Mod: NC,1L

## 2024-06-10 RX ORDER — AMLODIPINE BESYLATE 5 MG/1
5 TABLET ORAL DAILY
Qty: 30 | Refills: 5 | Status: ACTIVE | COMMUNITY
Start: 2024-06-10 | End: 1900-01-01

## 2024-06-10 RX ORDER — OMEPRAZOLE 40 MG/1
40 CAPSULE, DELAYED RELEASE ORAL
Qty: 30 | Refills: 2 | Status: ACTIVE | COMMUNITY
Start: 2023-07-18 | End: 1900-01-01

## 2024-06-10 NOTE — REVIEW OF SYSTEMS
[Earache] : earache [Chest Pain] : chest pain [Palpitations] : palpitations [Abdominal Pain] : abdominal pain [Fever] : no fever [Chills] : no chills [Vision Problems] : no vision problems [Hearing Loss] : no hearing loss [Nosebleed] : no nosebleeds [Hoarseness] : no hoarseness [Nasal Discharge] : no nasal discharge [Sore Throat] : no sore throat [Postnasal Drip] : no postnasal drip [Shortness Of Breath] : no shortness of breath [Wheezing] : no wheezing [Cough] : no cough [Nausea] : no nausea [Constipation] : no constipation [Diarrhea] : diarrhea [Vomiting] : no vomiting [Dysuria] : no dysuria [Joint Pain] : no joint pain [Muscle Pain] : no muscle pain [Suicidal] : not suicidal [FreeTextEntry2] : Vitals - /81, Temp 97.9, HR 88, RR 98, satting 98% on RA [FreeTextEntry5] : chronic follows w Dr. deleon, ECG and TTE normal [FreeTextEntry7] : chronic

## 2024-06-10 NOTE — HISTORY OF PRESENT ILLNESS
[FreeTextEntry1] : 38 yo female presenting to medicine clinic for follow up [de-identified] : 38 year old female with a PMH of chronic pelvic pain, Type 1 DM with chronic polyneuropathy of the lower extremities on an insulin pump, recurrent UTIs, PCOS, GERD, anxiety, pseudoseizures, LLL Pulmonary nodule?; bipolar disorder, x4 prior suicidal attempts; carpal tunnel, and suspected CELINE presents with ear pain co 2 weeks of ear pain-burning, inner ear and drainage, referred to neck and shoulder  taking neomycin and polymyxin drops ENT took culture and will see ENT on Tuesday has had multiple ear infections in past but hasn't had in 4-5  years, using qtips, no trauma to the ear needs renewal of prilosec, other renewed by other physicians not suicidal anymore now that shes on prozac, still sleeping a lot, no longer cough Taking prilosec 40 daily, lopressor 25 TID- sometimes misses third dose, prozac 20, topiramate 25 mg for migraines will go up after 28 days, insulin pump, flonase 50 mcg 1 spray each nostril 2X daily needs script for labs

## 2024-06-10 NOTE — ASSESSMENT
[FreeTextEntry1] : 38 year old female with a PMH of chronic pelvic pain, Type 1 DM with chronic polyneuropathy of the lower extremities on an insulin pump, recurrent UTIs, PCOS, GERD, anxiety, psuedoseizures, LLL Pulmonary nodule; bipolar disorder, x4 prior suicidal attempts; carpal tunnel, and suspected CELINE presents for ear pain  #Ear pain taking neomycin/polymyxin exam unrevealing, c/w drops and follow with ENT on Tuesday  #HTN -will prescribe amlodipine 5 mg daily  # GERD - c/w omeprazole 40mg qd  #pulmonary nodule? #Suspected CELINE #Chronic chest pain and palpitations - Follows Dr. Cirilo Coronado for pulm- appointment next week - follows Dr. Christian for cardio- had normal ECG and TTE recently per pt  #Depression/Anxiety #Hx of panic attacks - follows with psych, SI improved, still some symptoms of depression, planning to increase prozac  #Obesity #JOEL new diagnosis #suspected IBS per hepatology - diet and exercise - follows GI and Hepatology here - hasn't wanted GLP-1  # type 1 DM: - A1c: 10.3 (09/23) - Follows up with endocrinology Dr. Galvez -on insulin pump -repeat bloodwork  HCM - Follows GYN , optho and podiatry regularly; - labs ordered -prilosec renewed- only med refilled by Dr. huertas, other from other physicans - RTC in 6 months or PRN.

## 2024-06-10 NOTE — PHYSICAL EXAM
[No Acute Distress] : no acute distress [Well Nourished] : well nourished [Normal Sclera/Conjunctiva] : normal sclera/conjunctiva [Normal Outer Ear/Nose] : the outer ears and nose were normal in appearance [Normal Oropharynx] : the oropharynx was normal [Supple] : supple [No Respiratory Distress] : no respiratory distress  [No Accessory Muscle Use] : no accessory muscle use [Clear to Auscultation] : lungs were clear to auscultation bilaterally [Normal Rate] : normal rate  [Regular Rhythm] : with a regular rhythm [Normal S1, S2] : normal S1 and S2 [No Murmur] : no murmur heard [Soft] : abdomen soft [Non Tender] : non-tender [No CVA Tenderness] : no CVA  tenderness [No Joint Swelling] : no joint swelling [Speech Grossly Normal] : speech grossly normal [Memory Grossly Normal] : memory grossly normal [Alert and Oriented x3] : oriented to person, place, and time [de-identified] : trace edema

## 2024-06-11 ENCOUNTER — APPOINTMENT (OUTPATIENT)
Dept: PSYCHIATRY | Facility: CLINIC | Age: 39
End: 2024-06-11
Payer: MEDICAID

## 2024-06-11 ENCOUNTER — OUTPATIENT (OUTPATIENT)
Dept: OUTPATIENT SERVICES | Facility: HOSPITAL | Age: 39
LOS: 1 days | End: 2024-06-11
Payer: MEDICAID

## 2024-06-11 DIAGNOSIS — F41.0 PANIC DISORDER [EPISODIC PAROXYSMAL ANXIETY]: ICD-10-CM

## 2024-06-11 DIAGNOSIS — F60.3 BORDERLINE PERSONALITY DISORDER: ICD-10-CM

## 2024-06-11 DIAGNOSIS — F32.A DEPRESSION, UNSPECIFIED: ICD-10-CM

## 2024-06-11 DIAGNOSIS — F41.1 GENERALIZED ANXIETY DISORDER: ICD-10-CM

## 2024-06-11 DIAGNOSIS — F41.0 GENERALIZED ANXIETY DISORDER: ICD-10-CM

## 2024-06-11 DIAGNOSIS — F41.9 ANXIETY DISORDER, UNSPECIFIED: ICD-10-CM

## 2024-06-11 PROCEDURE — 99214 OFFICE O/P EST MOD 30 MIN: CPT | Mod: 95

## 2024-06-11 NOTE — HISTORY OF PRESENT ILLNESS
[Suicidal Behavior/Ideation] : suicidal behavior/ideation [FreeTextEntry1] : Pt seen in follow-up.  She reports that she finished the Amitriptyline taper a couple of days ago and she started the Topamax yesterday.  She has not seen any uptick in migraine activity.  This now gives us the option of pushing the Prozac dose.  She seems to be tolerating it well.  We will be targeting residual depressive and anxiety sx, fatigue (though that may have a medical etiology) and binge-eating.  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  This could also be helped with higher dose SSRI.  Despite pt's sx complaints, she presents as calm and euthymic.  No indication of any danger or distress.  Diabetes remains in poor control.  Pt's endocrinologist is considering starting and Ozempic of an equivalent.  Pt would likely benefit greatly from the weight loss.  She wants to see how things go with the Topamax first.  For today, we will rase the Prozac to 30mg.  Potential risks and benefits discussed. [FreeTextEntry2] : First treatment for depression and suicidality in late teens Approximately 8 IPP stays since then - the last in April 2024. Chronic suicidal thinking, with hx of past attempts.  Reportedly drank bleach/Pine-sol after father's death in 11/2022; prior to this patient has history of 3 remote attempts in her teens - drinking acetone, drinking hydrogen peroxide, and half bottle of ibuprofen. Pt has hx of ER visit for pseudo-seizures. [FreeTextEntry3] : Celexa, Cymbalta, Buspar, Zyprexa, Gabapentin (hives) - none were effective. Lexapro - ineffective Lamictal - fatigue

## 2024-06-11 NOTE — SOCIAL HISTORY
[FreeTextEntry1] : Father  in .  Pt lives with mother in assisted living ([facility]). Pt did not finish high school due to severe depression.

## 2024-06-11 NOTE — PHYSICAL EXAM
[Average] : average [Cooperative] : cooperative [Clear] : clear [Linear/Goal Directed] : linear/goal directed [None] : none [None Reported] : none reported [WNL] : within normal limits [Full] : full [FreeTextEntry8] : "the same" [de-identified] : fair [de-identified] : fair

## 2024-06-11 NOTE — PLAN
[Medication education provided] : Medication education provided. [Rationale for medication choices, possible risks/precautions, benefits, alternative treatment choices, and consequences of non-treatment discussed] : Rationale for medication choices, possible risks/precautions, benefits, alternative treatment choices, and consequences of non-treatment discussed with patient/family/caregiver  [FreeTextEntry4] : Medication management to mitigate active depressive and anxiety sx. [FreeTextEntry5] : Increase Prozac to 30mg daily Individual therapy RTC 1 month

## 2024-06-11 NOTE — RISK ASSESSMENT
[Low acute suicide risk] : Low acute suicide risk [Yes] : Yes [No, patient denies ideation or behavior] : No, patient denies ideation or behavior [FreeTextEntry9] : Pt appears safe for treatment at clinic level of care.

## 2024-06-11 NOTE — DISCUSSION/SUMMARY
[FreeTextEntry1] : Pt with chronic depression and anxiety.  Still symptomatic.  It is safe now to titrate upward on the Prozac.

## 2024-06-12 ENCOUNTER — EMERGENCY (EMERGENCY)
Facility: HOSPITAL | Age: 39
LOS: 0 days | Discharge: HOME | End: 2024-06-12
Attending: EMERGENCY MEDICINE
Payer: MEDICAID

## 2024-06-12 VITALS
WEIGHT: 197.09 LBS | TEMPERATURE: 98 F | SYSTOLIC BLOOD PRESSURE: 128 MMHG | HEART RATE: 79 BPM | HEIGHT: 64 IN | RESPIRATION RATE: 18 BRPM | DIASTOLIC BLOOD PRESSURE: 84 MMHG

## 2024-06-12 DIAGNOSIS — Z88.1 ALLERGY STATUS TO OTHER ANTIBIOTIC AGENTS STATUS: ICD-10-CM

## 2024-06-12 DIAGNOSIS — Z88.8 ALLERGY STATUS TO OTHER DRUGS, MEDICAMENTS AND BIOLOGICAL SUBSTANCES: ICD-10-CM

## 2024-06-12 DIAGNOSIS — R10.13 EPIGASTRIC PAIN: ICD-10-CM

## 2024-06-12 DIAGNOSIS — Z88.7 ALLERGY STATUS TO SERUM AND VACCINE: ICD-10-CM

## 2024-06-12 DIAGNOSIS — K21.9 GASTRO-ESOPHAGEAL REFLUX DISEASE WITHOUT ESOPHAGITIS: ICD-10-CM

## 2024-06-12 DIAGNOSIS — F41.0 PANIC DISORDER [EPISODIC PAROXYSMAL ANXIETY]: ICD-10-CM

## 2024-06-12 DIAGNOSIS — Z88.0 ALLERGY STATUS TO PENICILLIN: ICD-10-CM

## 2024-06-12 DIAGNOSIS — R11.0 NAUSEA: ICD-10-CM

## 2024-06-12 DIAGNOSIS — F41.9 ANXIETY DISORDER, UNSPECIFIED: ICD-10-CM

## 2024-06-12 DIAGNOSIS — F32.A DEPRESSION, UNSPECIFIED: ICD-10-CM

## 2024-06-12 DIAGNOSIS — E11.9 TYPE 2 DIABETES MELLITUS WITHOUT COMPLICATIONS: ICD-10-CM

## 2024-06-12 DIAGNOSIS — F32.9 MAJOR DEPRESSIVE DISORDER, SINGLE EPISODE, UNSPECIFIED: ICD-10-CM

## 2024-06-12 DIAGNOSIS — R07.9 CHEST PAIN, UNSPECIFIED: ICD-10-CM

## 2024-06-12 LAB
ALBUMIN SERPL ELPH-MCNC: 4.1 G/DL — SIGNIFICANT CHANGE UP (ref 3.5–5.2)
ALP SERPL-CCNC: 175 U/L — HIGH (ref 30–115)
ALT FLD-CCNC: 122 U/L — HIGH (ref 0–41)
ANION GAP SERPL CALC-SCNC: 14 MMOL/L — SIGNIFICANT CHANGE UP (ref 7–14)
AST SERPL-CCNC: 72 U/L — HIGH (ref 0–41)
BASOPHILS # BLD AUTO: 0.08 K/UL — SIGNIFICANT CHANGE UP (ref 0–0.2)
BASOPHILS NFR BLD AUTO: 0.7 % — SIGNIFICANT CHANGE UP (ref 0–1)
BILIRUB SERPL-MCNC: 0.3 MG/DL — SIGNIFICANT CHANGE UP (ref 0.2–1.2)
BUN SERPL-MCNC: 11 MG/DL — SIGNIFICANT CHANGE UP (ref 10–20)
CALCIUM SERPL-MCNC: 9.3 MG/DL — SIGNIFICANT CHANGE UP (ref 8.4–10.5)
CHLORIDE SERPL-SCNC: 97 MMOL/L — LOW (ref 98–110)
CO2 SERPL-SCNC: 21 MMOL/L — SIGNIFICANT CHANGE UP (ref 17–32)
CREAT SERPL-MCNC: 0.6 MG/DL — LOW (ref 0.7–1.5)
EGFR: 117 ML/MIN/1.73M2 — SIGNIFICANT CHANGE UP
EOSINOPHIL # BLD AUTO: 0.09 K/UL — SIGNIFICANT CHANGE UP (ref 0–0.7)
EOSINOPHIL NFR BLD AUTO: 0.8 % — SIGNIFICANT CHANGE UP (ref 0–8)
GLUCOSE SERPL-MCNC: 182 MG/DL — HIGH (ref 70–99)
HCG SERPL QL: NEGATIVE — SIGNIFICANT CHANGE UP
HCT VFR BLD CALC: 41.9 % — SIGNIFICANT CHANGE UP (ref 37–47)
HGB BLD-MCNC: 13.6 G/DL — SIGNIFICANT CHANGE UP (ref 12–16)
IMM GRANULOCYTES NFR BLD AUTO: 0.7 % — HIGH (ref 0.1–0.3)
LIDOCAIN IGE QN: 12 U/L — SIGNIFICANT CHANGE UP (ref 7–60)
LYMPHOCYTES # BLD AUTO: 18.3 % — LOW (ref 20.5–51.1)
LYMPHOCYTES # BLD AUTO: 2.01 K/UL — SIGNIFICANT CHANGE UP (ref 1.2–3.4)
MAGNESIUM SERPL-MCNC: 2 MG/DL — SIGNIFICANT CHANGE UP (ref 1.8–2.4)
MCHC RBC-ENTMCNC: 26.5 PG — LOW (ref 27–31)
MCHC RBC-ENTMCNC: 32.5 G/DL — SIGNIFICANT CHANGE UP (ref 32–37)
MCV RBC AUTO: 81.7 FL — SIGNIFICANT CHANGE UP (ref 81–99)
MONOCYTES # BLD AUTO: 0.7 K/UL — HIGH (ref 0.1–0.6)
MONOCYTES NFR BLD AUTO: 6.4 % — SIGNIFICANT CHANGE UP (ref 1.7–9.3)
NEUTROPHILS # BLD AUTO: 8.01 K/UL — HIGH (ref 1.4–6.5)
NEUTROPHILS NFR BLD AUTO: 73.1 % — SIGNIFICANT CHANGE UP (ref 42.2–75.2)
NRBC # BLD: 0 /100 WBCS — SIGNIFICANT CHANGE UP (ref 0–0)
NT-PROBNP SERPL-SCNC: <36 PG/ML — SIGNIFICANT CHANGE UP (ref 0–300)
PLATELET # BLD AUTO: 506 K/UL — HIGH (ref 130–400)
PMV BLD: 10 FL — SIGNIFICANT CHANGE UP (ref 7.4–10.4)
POTASSIUM SERPL-MCNC: 5.7 MMOL/L — HIGH (ref 3.5–5)
POTASSIUM SERPL-SCNC: 5.7 MMOL/L — HIGH (ref 3.5–5)
PROT SERPL-MCNC: 7.8 G/DL — SIGNIFICANT CHANGE UP (ref 6–8)
RBC # BLD: 5.13 M/UL — SIGNIFICANT CHANGE UP (ref 4.2–5.4)
RBC # FLD: 14.6 % — HIGH (ref 11.5–14.5)
SODIUM SERPL-SCNC: 132 MMOL/L — LOW (ref 135–146)
TROPONIN T, HIGH SENSITIVITY RESULT: <6 NG/L — SIGNIFICANT CHANGE UP (ref 6–13)
WBC # BLD: 10.97 K/UL — HIGH (ref 4.8–10.8)
WBC # FLD AUTO: 10.97 K/UL — HIGH (ref 4.8–10.8)

## 2024-06-12 PROCEDURE — 99285 EMERGENCY DEPT VISIT HI MDM: CPT

## 2024-06-12 PROCEDURE — 36415 COLL VENOUS BLD VENIPUNCTURE: CPT

## 2024-06-12 PROCEDURE — 96375 TX/PRO/DX INJ NEW DRUG ADDON: CPT

## 2024-06-12 PROCEDURE — 93010 ELECTROCARDIOGRAM REPORT: CPT

## 2024-06-12 PROCEDURE — 83735 ASSAY OF MAGNESIUM: CPT

## 2024-06-12 PROCEDURE — 96374 THER/PROPH/DIAG INJ IV PUSH: CPT

## 2024-06-12 PROCEDURE — 84484 ASSAY OF TROPONIN QUANT: CPT

## 2024-06-12 PROCEDURE — 99285 EMERGENCY DEPT VISIT HI MDM: CPT | Mod: 25

## 2024-06-12 PROCEDURE — 93005 ELECTROCARDIOGRAM TRACING: CPT

## 2024-06-12 PROCEDURE — 71045 X-RAY EXAM CHEST 1 VIEW: CPT | Mod: 26

## 2024-06-12 PROCEDURE — 76705 ECHO EXAM OF ABDOMEN: CPT | Mod: 26

## 2024-06-12 PROCEDURE — 83690 ASSAY OF LIPASE: CPT

## 2024-06-12 PROCEDURE — 71045 X-RAY EXAM CHEST 1 VIEW: CPT

## 2024-06-12 PROCEDURE — 84703 CHORIONIC GONADOTROPIN ASSAY: CPT

## 2024-06-12 PROCEDURE — 85025 COMPLETE CBC W/AUTO DIFF WBC: CPT

## 2024-06-12 PROCEDURE — 83880 ASSAY OF NATRIURETIC PEPTIDE: CPT

## 2024-06-12 PROCEDURE — 80053 COMPREHEN METABOLIC PANEL: CPT

## 2024-06-12 PROCEDURE — 76705 ECHO EXAM OF ABDOMEN: CPT

## 2024-06-12 RX ORDER — MORPHINE SULFATE 50 MG/1
4 CAPSULE, EXTENDED RELEASE ORAL ONCE
Refills: 0 | Status: DISCONTINUED | OUTPATIENT
Start: 2024-06-12 | End: 2024-06-12

## 2024-06-12 RX ORDER — FAMOTIDINE 10 MG/ML
20 INJECTION INTRAVENOUS ONCE
Refills: 0 | Status: COMPLETED | OUTPATIENT
Start: 2024-06-12 | End: 2024-06-12

## 2024-06-12 RX ADMIN — MORPHINE SULFATE 4 MILLIGRAM(S): 50 CAPSULE, EXTENDED RELEASE ORAL at 11:15

## 2024-06-12 RX ADMIN — FAMOTIDINE 20 MILLIGRAM(S): 10 INJECTION INTRAVENOUS at 13:08

## 2024-06-12 RX ADMIN — Medication 30 MILLILITER(S): at 13:08

## 2024-06-12 NOTE — ED PROVIDER NOTE - ATTENDING APP SHARED VISIT CONTRIBUTION OF CARE
39-year-old female past medical history of depression, spinal stenosis, tachycardia, diabetes, GERD, presents with complaint of epigastric pain.  Associated with nausea, but no vomiting.  Patient has no chest pain.  Patient has no diarrhea.  Patient has no fever or chills.  Patient admits she feels bloated.  Exam: nad, ncat, perrl, eomi, mmm, rrr, ctab, abd soft, mildly tender in epigastrium and left upper quadrant, nd aox3, no calf tenderness, no pitting edema impression: Patient with epigastric pain, bloating, will check labs, likely GERD/gastritis, symptomatic treatment and reassess

## 2024-06-12 NOTE — ED PROVIDER NOTE - WHICH SHOWED
ED EKG: my independent interpretation - Dr. Renetta Bonilla, attending physician :  79 nsr, qtc 493  , no stemi, no significant heart block

## 2024-06-12 NOTE — ED PROVIDER NOTE - PHYSICAL EXAMINATION
CONSTITUTIONAL: Well-appearing; well-nourished; in no apparent distress.   NECK: Supple; non-tender; no cervical lymphadenopathy.   CARDIOVASCULAR: Normal S1, S2; no murmurs, rubs, or gallops.   RESPIRATORY: Normal chest excursion with respiration; breath sounds clear and equal bilaterally; no wheezes, rhonchi, or rales.  GI/: +RUQ ttp and franks's; Non-distended; otherwise non-tender  MS: No evidence of trauma or deformity. No CVA tenderness. Normal ROM in all four extremities; non-tender to palpation; distal pulses are normal.   SKIN: Normal for age and race; warm; dry; good turgor; no apparent lesions or exudate.   NEURO/PSYCH: A & O x 4; grossly unremarkable. mood and manner are appropriate.

## 2024-06-12 NOTE — ED PROVIDER NOTE - PATIENT PORTAL LINK FT
You can access the FollowMyHealth Patient Portal offered by [facility] by registering at the following website: [email or web address]. By joining [facility]’s FollowMyHealth portal, you will also be able to view your health information using other applications (apps) compatible with our system.

## 2024-06-12 NOTE — ED PROVIDER NOTE - CLINICAL SUMMARY MEDICAL DECISION MAKING FREE TEXT BOX
Patient with epigastric pain and bloating, labs reassuring, GERD/gastritis symptomatic treatment trial in ED with some improvement.  Patient to follow-up with GI outpatient.  Any ordered labs and EKG were reviewed.  Any imaging was ordered and reviewed by me.  Appropriate medications for patient's presenting complaints were ordered and effects were reassessed.  Patient's records (prior hospital, ED visit, and/or nursing home notes if available) were reviewed.  Additional history was obtained from EMS, family, and/or PCP (where available).  Escalation to admission/observation was considered.  1) However patient feels much better and is comfortable with discharge.  Appropriate follow-up was arranged

## 2024-06-12 NOTE — ED PROVIDER NOTE - OBJECTIVE STATEMENT
pt with pmhx GERD, major depressive disorder, Spinal stenosis, Tachycardia, poorly controlled DM, presents to ED c/o epigastric pain radiating to entire abd for the last few days assoc with nausea. pain is sharp, nonradiating, moderate. denies exacerbating or relieving factors. Denies fever/chill/HA/dizziness/chest pain/palpitation/sob/v/d/ black stool/bloody stool/urinary sxs

## 2024-06-13 ENCOUNTER — APPOINTMENT (OUTPATIENT)
Dept: OBGYN | Facility: CLINIC | Age: 39
End: 2024-06-13
Payer: MEDICAID

## 2024-06-13 VITALS
SYSTOLIC BLOOD PRESSURE: 115 MMHG | HEIGHT: 64 IN | DIASTOLIC BLOOD PRESSURE: 73 MMHG | BODY MASS INDEX: 33.46 KG/M2 | WEIGHT: 196 LBS | HEART RATE: 91 BPM

## 2024-06-13 DIAGNOSIS — Z01.419 ENCOUNTER FOR GYNECOLOGICAL EXAMINATION (GENERAL) (ROUTINE) W/OUT ABNORMAL FINDINGS: ICD-10-CM

## 2024-06-13 PROCEDURE — 99395 PREV VISIT EST AGE 18-39: CPT | Mod: 25

## 2024-06-13 PROCEDURE — 99459 PELVIC EXAMINATION: CPT

## 2024-06-13 NOTE — HISTORY OF PRESENT ILLNESS
[FreeTextEntry1] : Patient is 39 years old para 0-0-0-0 last menstrual period June 2, 2024 She has a history of irregular menses.  Patient states that she may be starting Ozempic for weight reduction. She is presently without complaints.  She is adopted and would like to have a bilateral screening mammogram.

## 2024-06-13 NOTE — PHYSICAL EXAM
[Chaperone Present] : A chaperone was present in the examining room during all aspects of the physical examination [[id number]] : A chaperone was present during the pelvic exam. [FreeTextEntry2] : Rosita Ulloa [Appropriately responsive] : appropriately responsive [Alert] : alert [No Acute Distress] : no acute distress [No Lymphadenopathy] : no lymphadenopathy [Regular Rate Rhythm] : regular rate rhythm [No Murmurs] : no murmurs [Clear to Auscultation B/L] : clear to auscultation bilaterally [Soft] : soft [Non-tender] : non-tender [Non-distended] : non-distended [No HSM] : No HSM [No Lesions] : no lesions [No Mass] : no mass [Oriented x3] : oriented x3 [Examination Of The Breasts] : a normal appearance [No Masses] : no breast masses were palpable [Labia Majora] : normal [Labia Minora] : normal [Normal] : normal [Uterine Adnexae] : normal

## 2024-06-17 ENCOUNTER — APPOINTMENT (OUTPATIENT)
Dept: PSYCHIATRY | Facility: CLINIC | Age: 39
End: 2024-06-17

## 2024-06-17 ENCOUNTER — OUTPATIENT (OUTPATIENT)
Dept: OUTPATIENT SERVICES | Facility: HOSPITAL | Age: 39
LOS: 1 days | End: 2024-06-17
Payer: MEDICAID

## 2024-06-17 DIAGNOSIS — F41.1 GENERALIZED ANXIETY DISORDER: ICD-10-CM

## 2024-06-17 DIAGNOSIS — F41.0 PANIC DISORDER [EPISODIC PAROXYSMAL ANXIETY] WITHOUT AGORAPHOBIA: ICD-10-CM

## 2024-06-17 DIAGNOSIS — F32.A DEPRESSION, UNSPECIFIED: ICD-10-CM

## 2024-06-17 PROCEDURE — 90832 PSYTX W PT 30 MINUTES: CPT
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
## 2024-06-17 NOTE — PHYSICAL EXAM
[Cooperative] : cooperative [Euthymic] : euthymic [Full] : full [Clear] : clear [Linear/Goal Directed] : linear/goal directed [None] : none [None Reported] : none reported [Average] : average [WNL] : within normal limits [FreeTextEntry8] : " A little calmer"  [de-identified] : Fair

## 2024-06-17 NOTE — REASON FOR VISIT
[Patient] : Patient [FreeTextEntry1] : Patient is a 39-year-old female seen for a follow-up therapy session, 11:15 AM to 11:45 AM.

## 2024-06-18 DIAGNOSIS — F41.0 PANIC DISORDER [EPISODIC PAROXYSMAL ANXIETY] WITHOUT AGORAPHOBIA: ICD-10-CM

## 2024-06-18 DIAGNOSIS — F41.1 GENERALIZED ANXIETY DISORDER: ICD-10-CM

## 2024-06-18 DIAGNOSIS — F32.A DEPRESSION, UNSPECIFIED: ICD-10-CM

## 2024-06-20 DIAGNOSIS — I10 ESSENTIAL (PRIMARY) HYPERTENSION: ICD-10-CM

## 2024-06-20 DIAGNOSIS — H92.02 OTALGIA, LEFT EAR: ICD-10-CM

## 2024-06-20 DIAGNOSIS — E28.2 POLYCYSTIC OVARIAN SYNDROME: ICD-10-CM

## 2024-06-20 DIAGNOSIS — Z00.00 ENCOUNTER FOR GENERAL ADULT MEDICAL EXAMINATION WITHOUT ABNORMAL FINDINGS: ICD-10-CM

## 2024-06-20 DIAGNOSIS — E66.9 OBESITY, UNSPECIFIED: ICD-10-CM

## 2024-06-20 DIAGNOSIS — E11.9 TYPE 2 DIABETES MELLITUS WITHOUT COMPLICATIONS: ICD-10-CM

## 2024-06-28 ENCOUNTER — OUTPATIENT (OUTPATIENT)
Dept: OUTPATIENT SERVICES | Facility: HOSPITAL | Age: 39
LOS: 1 days | End: 2024-06-28
Payer: MEDICAID

## 2024-06-28 ENCOUNTER — APPOINTMENT (OUTPATIENT)
Dept: PSYCHIATRY | Facility: CLINIC | Age: 39
End: 2024-06-28

## 2024-06-28 DIAGNOSIS — F41.1 GENERALIZED ANXIETY DISORDER: ICD-10-CM

## 2024-06-28 DIAGNOSIS — F41.0 PANIC DISORDER [EPISODIC PAROXYSMAL ANXIETY]: ICD-10-CM

## 2024-06-28 DIAGNOSIS — F41.9 ANXIETY DISORDER, UNSPECIFIED: ICD-10-CM

## 2024-06-28 DIAGNOSIS — F32.A DEPRESSION, UNSPECIFIED: ICD-10-CM

## 2024-06-28 PROCEDURE — 90832 PSYTX W PT 30 MINUTES: CPT

## 2024-06-29 DIAGNOSIS — F41.9 ANXIETY DISORDER, UNSPECIFIED: ICD-10-CM

## 2024-06-29 DIAGNOSIS — F41.0 PANIC DISORDER [EPISODIC PAROXYSMAL ANXIETY] WITHOUT AGORAPHOBIA: ICD-10-CM

## 2024-06-29 DIAGNOSIS — F41.1 GENERALIZED ANXIETY DISORDER: ICD-10-CM

## 2024-07-02 ENCOUNTER — NON-APPOINTMENT (OUTPATIENT)
Age: 39
End: 2024-07-02

## 2024-07-02 ENCOUNTER — RESULT REVIEW (OUTPATIENT)
Age: 39
End: 2024-07-02

## 2024-07-02 ENCOUNTER — OUTPATIENT (OUTPATIENT)
Dept: OUTPATIENT SERVICES | Facility: HOSPITAL | Age: 39
LOS: 1 days | End: 2024-07-02
Payer: MEDICAID

## 2024-07-02 DIAGNOSIS — Z12.31 ENCOUNTER FOR SCREENING MAMMOGRAM FOR MALIGNANT NEOPLASM OF BREAST: ICD-10-CM

## 2024-07-02 PROCEDURE — 77063 BREAST TOMOSYNTHESIS BI: CPT

## 2024-07-02 PROCEDURE — 77063 BREAST TOMOSYNTHESIS BI: CPT | Mod: 26

## 2024-07-02 PROCEDURE — 77067 SCR MAMMO BI INCL CAD: CPT | Mod: 26

## 2024-07-02 PROCEDURE — 77067 SCR MAMMO BI INCL CAD: CPT

## 2024-07-03 DIAGNOSIS — Z12.31 ENCOUNTER FOR SCREENING MAMMOGRAM FOR MALIGNANT NEOPLASM OF BREAST: ICD-10-CM

## 2024-07-04 ENCOUNTER — RX RENEWAL (OUTPATIENT)
Age: 39
End: 2024-07-04

## 2024-07-10 ENCOUNTER — APPOINTMENT (OUTPATIENT)
Dept: OTOLARYNGOLOGY | Facility: CLINIC | Age: 39
End: 2024-07-10
Payer: MEDICAID

## 2024-07-10 DIAGNOSIS — R09.82 POSTNASAL DRIP: ICD-10-CM

## 2024-07-10 PROCEDURE — 99214 OFFICE O/P EST MOD 30 MIN: CPT

## 2024-07-10 PROCEDURE — 92550 TYMPANOMETRY & REFLEX THRESH: CPT | Mod: 52

## 2024-07-10 PROCEDURE — 92557 COMPREHENSIVE HEARING TEST: CPT

## 2024-07-10 RX ORDER — FLUOXETINE HYDROCHLORIDE 40 MG/1
CAPSULE ORAL
Refills: 0 | Status: ACTIVE | COMMUNITY

## 2024-07-10 RX ORDER — AZELASTINE HYDROCHLORIDE 137 UG/1
0.1 SPRAY, METERED NASAL DAILY
Qty: 1 | Refills: 3 | Status: ACTIVE | COMMUNITY
Start: 2024-07-10 | End: 1900-01-01

## 2024-07-15 ENCOUNTER — APPOINTMENT (OUTPATIENT)
Dept: PSYCHIATRY | Facility: CLINIC | Age: 39
End: 2024-07-15

## 2024-07-15 ENCOUNTER — OUTPATIENT (OUTPATIENT)
Dept: OUTPATIENT SERVICES | Facility: HOSPITAL | Age: 39
LOS: 1 days | End: 2024-07-15
Payer: MEDICAID

## 2024-07-15 DIAGNOSIS — F41.9 ANXIETY DISORDER, UNSPECIFIED: ICD-10-CM

## 2024-07-15 DIAGNOSIS — F32.A DEPRESSION, UNSPECIFIED: ICD-10-CM

## 2024-07-15 DIAGNOSIS — F41.0 PANIC DISORDER [EPISODIC PAROXYSMAL ANXIETY]: ICD-10-CM

## 2024-07-15 DIAGNOSIS — F41.1 GENERALIZED ANXIETY DISORDER: ICD-10-CM

## 2024-07-15 PROCEDURE — 90832 PSYTX W PT 30 MINUTES: CPT

## 2024-07-16 DIAGNOSIS — F41.1 GENERALIZED ANXIETY DISORDER: ICD-10-CM

## 2024-07-16 DIAGNOSIS — F41.0 PANIC DISORDER [EPISODIC PAROXYSMAL ANXIETY]: ICD-10-CM

## 2024-07-16 DIAGNOSIS — F32.A DEPRESSION, UNSPECIFIED: ICD-10-CM

## 2024-07-17 ENCOUNTER — APPOINTMENT (OUTPATIENT)
Dept: PSYCHIATRY | Facility: CLINIC | Age: 39
End: 2024-07-17
Payer: MEDICAID

## 2024-07-17 ENCOUNTER — OUTPATIENT (OUTPATIENT)
Dept: OUTPATIENT SERVICES | Facility: HOSPITAL | Age: 39
LOS: 1 days | End: 2024-07-17
Payer: MEDICAID

## 2024-07-17 DIAGNOSIS — F41.1 GENERALIZED ANXIETY DISORDER: ICD-10-CM

## 2024-07-17 DIAGNOSIS — F32.A DEPRESSION, UNSPECIFIED: ICD-10-CM

## 2024-07-17 DIAGNOSIS — F41.0 GENERALIZED ANXIETY DISORDER: ICD-10-CM

## 2024-07-17 DIAGNOSIS — F41.9 ANXIETY DISORDER, UNSPECIFIED: ICD-10-CM

## 2024-07-17 PROCEDURE — 99214 OFFICE O/P EST MOD 30 MIN: CPT | Mod: 95

## 2024-07-18 DIAGNOSIS — F41.9 ANXIETY DISORDER, UNSPECIFIED: ICD-10-CM

## 2024-07-18 DIAGNOSIS — F41.1 GENERALIZED ANXIETY DISORDER: ICD-10-CM

## 2024-07-18 DIAGNOSIS — F32.A DEPRESSION, UNSPECIFIED: ICD-10-CM

## 2024-07-19 ENCOUNTER — APPOINTMENT (OUTPATIENT)
Dept: OBGYN | Facility: CLINIC | Age: 39
End: 2024-07-19
Payer: MEDICAID

## 2024-07-19 VITALS
BODY MASS INDEX: 33.46 KG/M2 | WEIGHT: 196 LBS | DIASTOLIC BLOOD PRESSURE: 72 MMHG | HEIGHT: 64 IN | SYSTOLIC BLOOD PRESSURE: 112 MMHG

## 2024-07-19 DIAGNOSIS — B37.31 ACUTE CANDIDIASIS OF VULVA AND VAGINA: ICD-10-CM

## 2024-07-19 DIAGNOSIS — N89.8 OTHER SPECIFIED NONINFLAMMATORY DISORDERS OF VAGINA: ICD-10-CM

## 2024-07-19 DIAGNOSIS — N90.89 OTHER SPECIFIED NONINFLAMMATORY DISORDERS OF VULVA AND PERINEUM: ICD-10-CM

## 2024-07-19 PROCEDURE — 99214 OFFICE O/P EST MOD 30 MIN: CPT | Mod: 25

## 2024-07-19 PROCEDURE — 99459 PELVIC EXAMINATION: CPT

## 2024-07-22 ENCOUNTER — NON-APPOINTMENT (OUTPATIENT)
Age: 39
End: 2024-07-22

## 2024-07-25 ENCOUNTER — APPOINTMENT (OUTPATIENT)
Dept: NEUROLOGY | Facility: CLINIC | Age: 39
End: 2024-07-25

## 2024-07-26 ENCOUNTER — APPOINTMENT (OUTPATIENT)
Dept: NEUROLOGY | Facility: CLINIC | Age: 39
End: 2024-07-26
Payer: MEDICAID

## 2024-07-26 VITALS
SYSTOLIC BLOOD PRESSURE: 118 MMHG | BODY MASS INDEX: 32.78 KG/M2 | HEART RATE: 78 BPM | DIASTOLIC BLOOD PRESSURE: 78 MMHG | HEIGHT: 64 IN | WEIGHT: 192 LBS

## 2024-07-26 DIAGNOSIS — R51.9 HEADACHE, UNSPECIFIED: ICD-10-CM

## 2024-07-26 DIAGNOSIS — M54.50 LOW BACK PAIN, UNSPECIFIED: ICD-10-CM

## 2024-07-26 DIAGNOSIS — G62.9 POLYNEUROPATHY, UNSPECIFIED: ICD-10-CM

## 2024-07-26 PROCEDURE — 99215 OFFICE O/P EST HI 40 MIN: CPT

## 2024-07-26 PROCEDURE — G2211 COMPLEX E/M VISIT ADD ON: CPT | Mod: NC,1L

## 2024-07-26 RX ORDER — AMLODIPINE BESYLATE 5 MG/1
5 TABLET ORAL
Refills: 0 | Status: ACTIVE | COMMUNITY

## 2024-07-26 RX ORDER — FLUCONAZOLE 150 MG/1
150 TABLET ORAL
Qty: 1 | Refills: 0 | Status: DISCONTINUED | COMMUNITY
Start: 2024-07-19 | End: 2024-07-26

## 2024-07-26 RX ORDER — METOPROLOL TARTRATE 25 MG/1
25 TABLET, FILM COATED ORAL
Refills: 0 | Status: ACTIVE | COMMUNITY

## 2024-07-26 RX ORDER — OMEPRAZOLE MAGNESIUM 40 MG/1
CAPSULE, DELAYED RELEASE ORAL
Refills: 0 | Status: ACTIVE | COMMUNITY

## 2024-07-26 RX ORDER — CLOTRIMAZOLE AND BETAMETHASONE DIPROPIONATE 10; .5 MG/G; MG/G
1-0.05 CREAM TOPICAL TWICE DAILY
Qty: 1 | Refills: 0 | Status: DISCONTINUED | COMMUNITY
Start: 2024-07-19 | End: 2024-07-26

## 2024-07-29 ENCOUNTER — EMERGENCY (EMERGENCY)
Facility: HOSPITAL | Age: 39
LOS: 0 days | Discharge: HOME | End: 2024-07-29
Attending: EMERGENCY MEDICINE
Payer: MEDICAID

## 2024-07-29 VITALS
TEMPERATURE: 98 F | OXYGEN SATURATION: 99 % | HEART RATE: 77 BPM | RESPIRATION RATE: 18 BRPM | DIASTOLIC BLOOD PRESSURE: 72 MMHG | SYSTOLIC BLOOD PRESSURE: 114 MMHG

## 2024-07-29 VITALS
TEMPERATURE: 99 F | SYSTOLIC BLOOD PRESSURE: 120 MMHG | HEART RATE: 75 BPM | WEIGHT: 197.09 LBS | HEIGHT: 64 IN | DIASTOLIC BLOOD PRESSURE: 70 MMHG | OXYGEN SATURATION: 99 % | RESPIRATION RATE: 18 BRPM

## 2024-07-29 DIAGNOSIS — F41.9 ANXIETY DISORDER, UNSPECIFIED: ICD-10-CM

## 2024-07-29 DIAGNOSIS — F32.A DEPRESSION, UNSPECIFIED: ICD-10-CM

## 2024-07-29 DIAGNOSIS — E11.9 TYPE 2 DIABETES MELLITUS WITHOUT COMPLICATIONS: ICD-10-CM

## 2024-07-29 DIAGNOSIS — Z88.1 ALLERGY STATUS TO OTHER ANTIBIOTIC AGENTS: ICD-10-CM

## 2024-07-29 DIAGNOSIS — Z88.8 ALLERGY STATUS TO OTHER DRUGS, MEDICAMENTS AND BIOLOGICAL SUBSTANCES: ICD-10-CM

## 2024-07-29 DIAGNOSIS — M54.50 LOW BACK PAIN, UNSPECIFIED: ICD-10-CM

## 2024-07-29 DIAGNOSIS — R30.0 DYSURIA: ICD-10-CM

## 2024-07-29 DIAGNOSIS — Z88.0 ALLERGY STATUS TO PENICILLIN: ICD-10-CM

## 2024-07-29 LAB
ALBUMIN SERPL ELPH-MCNC: 4.2 G/DL — SIGNIFICANT CHANGE UP (ref 3.5–5.2)
ALP SERPL-CCNC: 181 U/L — HIGH (ref 30–115)
ALT FLD-CCNC: 62 U/L — HIGH (ref 0–41)
ANION GAP SERPL CALC-SCNC: 11 MMOL/L — SIGNIFICANT CHANGE UP (ref 7–14)
APPEARANCE UR: CLEAR — SIGNIFICANT CHANGE UP
AST SERPL-CCNC: 24 U/L — SIGNIFICANT CHANGE UP (ref 0–41)
BASOPHILS # BLD AUTO: 0.07 K/UL — SIGNIFICANT CHANGE UP (ref 0–0.2)
BASOPHILS NFR BLD AUTO: 0.7 % — SIGNIFICANT CHANGE UP (ref 0–1)
BILIRUB SERPL-MCNC: <0.2 MG/DL — SIGNIFICANT CHANGE UP (ref 0.2–1.2)
BILIRUB UR-MCNC: NEGATIVE — SIGNIFICANT CHANGE UP
BUN SERPL-MCNC: 11 MG/DL — SIGNIFICANT CHANGE UP (ref 10–20)
CALCIUM SERPL-MCNC: 8.9 MG/DL — SIGNIFICANT CHANGE UP (ref 8.4–10.5)
CHLORIDE SERPL-SCNC: 101 MMOL/L — SIGNIFICANT CHANGE UP (ref 98–110)
CO2 SERPL-SCNC: 22 MMOL/L — SIGNIFICANT CHANGE UP (ref 17–32)
COLOR SPEC: YELLOW — SIGNIFICANT CHANGE UP
CREAT SERPL-MCNC: 0.7 MG/DL — SIGNIFICANT CHANGE UP (ref 0.7–1.5)
DIFF PNL FLD: NEGATIVE — SIGNIFICANT CHANGE UP
EGFR: 113 ML/MIN/1.73M2 — SIGNIFICANT CHANGE UP
EOSINOPHIL # BLD AUTO: 0.1 K/UL — SIGNIFICANT CHANGE UP (ref 0–0.7)
EOSINOPHIL NFR BLD AUTO: 1.1 % — SIGNIFICANT CHANGE UP (ref 0–8)
GLUCOSE SERPL-MCNC: 282 MG/DL — HIGH (ref 70–99)
GLUCOSE UR QL: >=1000 MG/DL
HCG SERPL QL: NEGATIVE — SIGNIFICANT CHANGE UP
HCT VFR BLD CALC: 38.1 % — SIGNIFICANT CHANGE UP (ref 37–47)
HGB BLD-MCNC: 11.8 G/DL — LOW (ref 12–16)
IMM GRANULOCYTES NFR BLD AUTO: 0.7 % — HIGH (ref 0.1–0.3)
KETONES UR-MCNC: NEGATIVE MG/DL — SIGNIFICANT CHANGE UP
LACTATE SERPL-SCNC: 1.3 MMOL/L — SIGNIFICANT CHANGE UP (ref 0.7–2)
LEUKOCYTE ESTERASE UR-ACNC: NEGATIVE — SIGNIFICANT CHANGE UP
LIDOCAIN IGE QN: 16 U/L — SIGNIFICANT CHANGE UP (ref 7–60)
LYMPHOCYTES # BLD AUTO: 2.1 K/UL — SIGNIFICANT CHANGE UP (ref 1.2–3.4)
LYMPHOCYTES # BLD AUTO: 22.4 % — SIGNIFICANT CHANGE UP (ref 20.5–51.1)
MCHC RBC-ENTMCNC: 25.8 PG — LOW (ref 27–31)
MCHC RBC-ENTMCNC: 31 G/DL — LOW (ref 32–37)
MCV RBC AUTO: 83.2 FL — SIGNIFICANT CHANGE UP (ref 81–99)
MONOCYTES # BLD AUTO: 0.71 K/UL — HIGH (ref 0.1–0.6)
MONOCYTES NFR BLD AUTO: 7.6 % — SIGNIFICANT CHANGE UP (ref 1.7–9.3)
NEUTROPHILS # BLD AUTO: 6.34 K/UL — SIGNIFICANT CHANGE UP (ref 1.4–6.5)
NEUTROPHILS NFR BLD AUTO: 67.5 % — SIGNIFICANT CHANGE UP (ref 42.2–75.2)
NITRITE UR-MCNC: NEGATIVE — SIGNIFICANT CHANGE UP
NRBC # BLD: 0 /100 WBCS — SIGNIFICANT CHANGE UP (ref 0–0)
PH UR: 6.5 — SIGNIFICANT CHANGE UP (ref 5–8)
PLATELET # BLD AUTO: 446 K/UL — HIGH (ref 130–400)
PMV BLD: 10.6 FL — HIGH (ref 7.4–10.4)
POTASSIUM SERPL-MCNC: 4.4 MMOL/L — SIGNIFICANT CHANGE UP (ref 3.5–5)
POTASSIUM SERPL-SCNC: 4.4 MMOL/L — SIGNIFICANT CHANGE UP (ref 3.5–5)
PROT SERPL-MCNC: 6.9 G/DL — SIGNIFICANT CHANGE UP (ref 6–8)
PROT UR-MCNC: NEGATIVE MG/DL — SIGNIFICANT CHANGE UP
RBC # BLD: 4.58 M/UL — SIGNIFICANT CHANGE UP (ref 4.2–5.4)
RBC # FLD: 15.5 % — HIGH (ref 11.5–14.5)
SODIUM SERPL-SCNC: 134 MMOL/L — LOW (ref 135–146)
SP GR SPEC: >1.03 — HIGH (ref 1–1.03)
UROBILINOGEN FLD QL: 1 MG/DL — SIGNIFICANT CHANGE UP (ref 0.2–1)
WBC # BLD: 9.39 K/UL — SIGNIFICANT CHANGE UP (ref 4.8–10.8)
WBC # FLD AUTO: 9.39 K/UL — SIGNIFICANT CHANGE UP (ref 4.8–10.8)

## 2024-07-29 PROCEDURE — 74177 CT ABD & PELVIS W/CONTRAST: CPT | Mod: MC

## 2024-07-29 PROCEDURE — 84703 CHORIONIC GONADOTROPIN ASSAY: CPT

## 2024-07-29 PROCEDURE — 99284 EMERGENCY DEPT VISIT MOD MDM: CPT | Mod: 25

## 2024-07-29 PROCEDURE — 85025 COMPLETE CBC W/AUTO DIFF WBC: CPT

## 2024-07-29 PROCEDURE — 83605 ASSAY OF LACTIC ACID: CPT

## 2024-07-29 PROCEDURE — 83690 ASSAY OF LIPASE: CPT

## 2024-07-29 PROCEDURE — 36415 COLL VENOUS BLD VENIPUNCTURE: CPT

## 2024-07-29 PROCEDURE — 80053 COMPREHEN METABOLIC PANEL: CPT

## 2024-07-29 PROCEDURE — 81003 URINALYSIS AUTO W/O SCOPE: CPT

## 2024-07-29 PROCEDURE — 96374 THER/PROPH/DIAG INJ IV PUSH: CPT | Mod: XU

## 2024-07-29 PROCEDURE — 99285 EMERGENCY DEPT VISIT HI MDM: CPT

## 2024-07-29 PROCEDURE — 74177 CT ABD & PELVIS W/CONTRAST: CPT | Mod: 26,MC

## 2024-07-29 RX ORDER — ACETAMINOPHEN 325 MG
975 TABLET ORAL ONCE
Refills: 0 | Status: DISCONTINUED | OUTPATIENT
Start: 2024-07-29 | End: 2024-07-29

## 2024-07-29 RX ORDER — KETOROLAC TROMETHAMINE 30 MG/ML
15 INJECTION, SOLUTION INTRAMUSCULAR ONCE
Refills: 0 | Status: DISCONTINUED | OUTPATIENT
Start: 2024-07-29 | End: 2024-07-29

## 2024-07-29 RX ORDER — NITROFURANTOIN MACROCRYSTAL 100 MG
1 CAPSULE ORAL
Qty: 14 | Refills: 0
Start: 2024-07-29 | End: 2024-08-04

## 2024-07-29 RX ORDER — SODIUM CHLORIDE 0.9 % (FLUSH) 0.9 %
1000 SYRINGE (ML) INJECTION ONCE
Refills: 0 | Status: COMPLETED | OUTPATIENT
Start: 2024-07-29 | End: 2024-07-29

## 2024-07-29 RX ORDER — ONDANSETRON HYDROCHLORIDE 2 MG/ML
4 INJECTION INTRAMUSCULAR; INTRAVENOUS ONCE
Refills: 0 | Status: DISCONTINUED | OUTPATIENT
Start: 2024-07-29 | End: 2024-07-29

## 2024-07-29 RX ADMIN — KETOROLAC TROMETHAMINE 15 MILLIGRAM(S): 30 INJECTION, SOLUTION INTRAMUSCULAR at 11:14

## 2024-07-29 RX ADMIN — Medication 1000 MILLILITER(S): at 11:15

## 2024-07-29 NOTE — ED PROVIDER NOTE - PHYSICAL EXAMINATION
Gen: NAD, AOx3  Head: NCAT  HEENT: PERRL, oral mucosa moist, normal conjunctiva, oropharynx clear without exudate or erythema  Lung: CTAB, no respiratory distress, no wheezing, rales, rhonchi  CV: normal s1/s2, rrr, Normal perfusion, pulses 2+ throughout  Abd: soft, BLQ tenderness, ND, no CVA tenderness  Genitourinary: no pelvic tenderness  MSK: No edema, no visible deformities, full range of motion in all 4 extremities  Neuro: CN II-XII grossly intact, No focal neurologic deficits  Skin: No rash   Psych: normal affect

## 2024-07-29 NOTE — HISTORY OF PRESENT ILLNESS
[FreeTextEntry1] : Since last visit she has increased amount of migraines and headaches.  She also has her neuropathy which continues to bother her.  Her medications were changed and she is no longer on TCA.   From last visit with NP Disomma 2/21/2024 Ms. MONTGOMERY 38 year old female presents for a follow up visit. She is a former patient of Dr. Dumont last seen September 2023. PMH non epileptic seizures-follows with psychiatry who is managing her Lamictal, on Elavil for headaches, Carpal Tunnel Syndrome non complaint with carpal tunnel braces, and back pain uses a rolling walker, polyneuropathy likely in the setting of DM- currently uncontrolled.   Today reports:  Getting hand and leg tremors, more often than prior. Per mom they occur occasionally, and she mainly gets them when she's out. They think it may be r/t social anxiety since it mainly occurs in social situations. No LOC, no staring off, no drooling. No correlation to rest vs. movement. Mom reports she had an episode in the waiting room, which was likely r/t anxiety since she felt like people were staring at her.  She is still on Lamictal, psych is managing this. She just got a new psychiatrist.  Neuropathy is stable, no numbness, only gets tingling, she gets burning with prolonged walked. DM uncontrolled. No ulcers, sees podiatry. Was dx with small fiber neuropathy in the past.  Headaches are improved since her last visit on Elavil. Gets headache 3-4 x per month, varies in duration. They're less severe than used to be. Sometimes frontal and back of head, which is the same location as always.  Does not report back pain recently.  Regarding CTS compliant with her wrist brace, her fingers lock. Her ring fingers lock when she wakes up in the morning- she reports significant inadequate hydration. Usually is relieved in about 10 mins.

## 2024-07-29 NOTE — ASSESSMENT
[FreeTextEntry1] : Ms. MONTGOMERY 39 year old female presents for a follow up visit regarding multiple neurologic complaints.   -Neuropathy: DM uncontrolled, advised to control DM, not painful at this time. Uses a rolling walker.  -Headaches: worse since stopping the elavil - not improving on the topamax, will start [id number] Qmonthly -Tremors: Previously worked up with VEEG; advised pseudoseizures, mainly occur in social situations and are likely secondary to psychiatric overlay. She is in the process of getting a new psychiatrist. Will continue to monitor clinically. -CTS: Increase compliance with braces -Low back/Sciatica - referral to Physical therapy and continue with pain management -Would stop topamax after starting Aimovig (can taper to 25mg BID then stop in 2 weeks.  RTC 3-6 months with NP Disomma and then me

## 2024-07-29 NOTE — ED PROVIDER NOTE - DIFFERENTIAL DIAGNOSIS
UTI/cystitis/acute appendicitis, diverticulitis, likely pyelonephritis, bowel obstruction, ovarian torsion Differential Diagnosis

## 2024-07-29 NOTE — ED PROVIDER NOTE - OBJECTIVE STATEMENT
38 yo female with a pmh of dm, depression, and anxiety presents c/o lower back pain that radiates to her abdomen. pt states her pain has been constant for 2 weeks and noted the pain to be sharp in nature. pt states to have experienced dysuria that started today. pt denies any other symptoms including fevers, chill, headache, recent illness/travel, cough, chest pain, or SOB.

## 2024-07-29 NOTE — ED PROVIDER NOTE - CLINICAL SUMMARY MEDICAL DECISION MAKING FREE TEXT BOX
39-year-old female with urinary symptoms for 2 days.  Vital signs reviewed and are normal.  Labs and imaging is unremarkable.  Given symptoms of dysuria, prescription for antibiotic was sent to the patient's pharmacy.  She was advised to follow-up with her PCP, Dr Ailyn Thomas, , strict return precautions given.  Collateral information obtained from the patient's mother.

## 2024-07-29 NOTE — ED PROVIDER NOTE - PATIENT PORTAL LINK FT
You can access the FollowMyHealth Patient Portal offered by [facility] by registering at the following website: [email or web address]. By joining [facility]’s FollowMyHealth portal, you will also be able to view your health information using other applications (apps) compatible with our system.

## 2024-07-29 NOTE — HISTORY OF PRESENT ILLNESS
[FreeTextEntry1] : Since last visit she has increased amount of migraines and headaches.  She also has her neuropathy which continues to bother her.  Her medications were changed and she is no longer on TCA.   From last visit with NP Disomma 2/21/2024 Ms. MONTGOMERY 38 year old female presents for a follow up visit. She is a former patient of Dr. Dumont last seen September 2023. PMH non epileptic seizures-follows with psychiatry who is managing her Lamictal, on Elavil for headaches, Carpal Tunnel Syndrome non complaint with carpal tunnel braces, and back pain uses a rolling walker, polyneuropathy likely in the setting of DM- currently uncontrolled.   Today reports:  Getting hand and leg tremors, more often than prior. Per mom they occur occasionally, and she mainly gets them when she's out. They think it may be r/t social anxiety since it mainly occurs in social situations. No LOC, no staring off, no drooling. No correlation to rest vs. movement. Mom reports she had an episode in the waiting room, which was likely r/t anxiety since she felt like people were staring at her.  She is still on Lamictal, psych is managing this. She just got a new psychiatrist.  Neuropathy is stable, no numbness, only gets tingling, she gets burning with prolonged walked. DM uncontrolled. No ulcers, sees podiatry. Was dx with small fiber neuropathy in the past.  Headaches are improved since her last visit on Elavil. Gets headache 3-4 x per month, varies in duration. They're less severe than used to be. Sometimes frontal and back of head, which is the same location as always.  Does not report back pain recently.  Regarding CTS compliant with her wrist brace, her fingers lock. Her ring fingers lock when she wakes up in the morning- she reports significant inadequate hydration. Usually is relieved in about 10 mins.  .

## 2024-07-29 NOTE — ED PROVIDER NOTE - NSFOLLOWUPINSTRUCTIONS_ED_ALL_ED_FT
Please follow up with your primary care physician within 24-72 hours and return immediately if symptoms worsen.    Dysuria  Dysuria is pain or discomfort during urination. The pain or discomfort may be felt in the part of the body that drains urine from the bladder (urethra) or in the surrounding tissue of the genitals. The pain may also be felt in the groin area, lower abdomen, or lower back.    You may have to urinate frequently or have the sudden feeling that you have to urinate (urgency). Dysuria can affect anyone, but it is more common in females. Dysuria can be caused by many different things, including:  Urinary tract infection.  Kidney stones or bladder stones.  Certain STIs (sexually transmitted infections), such as chlamydia.  Dehydration.  Inflammation of the tissues of the vagina.  Use of certain medicines.  Use of certain soaps or scented products that cause irritation.  Follow these instructions at home:  Medicines    Take over-the-counter and prescription medicines only as told by your health care provider.  If you were prescribed an antibiotic medicine, take it as told by your health care provider. Do not stop taking the antibiotic even if you start to feel better.  Eating and drinking      Drink enough fluid to keep your urine pale yellow.  Avoid caffeinated beverages, tea, and alcohol. These beverages can irritate the bladder and make dysuria worse. In males, alcohol may irritate the prostate.  General instructions    Watch your condition for any changes.  Urinate often. Avoid holding urine for long periods of time.  If you are female, you should wipe from front to back after urinating or having a bowel movement. Use each piece of toilet paper only once.  Empty your bladder after sex.  Keep all follow-up visits. This is important.  If you had any tests done to find the cause of dysuria, it is up to you to get your test results. Ask your health care provider, or the department that is doing the test, when your results will be ready.  Contact a health care provider if:  You have a fever.  You develop pain in your back or sides.  You have nausea or vomiting.  You have blood in your urine.  You are not urinating as often as you usually do.  Get help right away if:  Your pain is severe and not relieved with medicines.  You cannot eat or drink without vomiting.  You are confused.  You have a rapid heartbeat while resting.  You have shaking or chills.  You feel extremely weak.  Summary  Dysuria is pain or discomfort while urinating. Many different conditions can lead to dysuria.  If you have dysuria, you may have to urinate frequently or have the sudden feeling that you have to urinate (urgency).  Watch your condition for any changes. Keep all follow-up visits.  Make sure that you urinate often and drink enough fluid to keep your urine pale yellow.  This information is not intended to replace advice given to you by your health care provider. Make sure you discuss any questions you have with your health care provider.    Document Revised: 07/30/2021 Document Reviewed: 07/30/2021  [employer] Patient Education © 2024 [employer]

## 2024-07-29 NOTE — ASSESSMENT
[FreeTextEntry1] : Ms. MONTGOMERY 39 year old female presents for a follow up visit regarding multiple neurologic complaints.   -Neuropathy: DM uncontrolled, advised to control DM, not painful at this time. Uses a rolling walker.  -Headaches: worse since stopping the elavil - not improving on the topamax, will start [id number] Qmonthly -Tremors: Previously worked up with VEEG; advised pseudoseizures, mainly occur in social situations and are likely secondary to psychiatric overlay. She is in the process of getting a new psychiatrist. Will continue to monitor clinically. -CTS: Increase compliance with braces -Low back/Sciatica - referral to Physical therapy and continue with pain management -Would stop topamax after starting Aimovig (can taper to 25mg BID then stop in 2 weeks.  RTC 3-6 months with NP Disomma and then me

## 2024-07-29 NOTE — ED PROVIDER NOTE - ATTENDING APP SHARED VISIT CONTRIBUTION OF CARE
39-year-old female with abdominal and back pain for several days.  Additionally reports some dysuria.  Recently seen by her OB/GYN and prescribed Diflucan for yeast infection.  No fever or chills, no nausea vomiting, no hematuria, diarrhea, rash or any other additional complaints.  Well-appearing well-nourished, NAD, head AT/NC, PERRL, pink conjunctivae,  mmm, , nml work of breathing, lungs CTA b/l, equal air entry, RRR, well-perfused extremities, distal pulses intact, abdomen soft, mild lower abdominal tenderness to palpation without rebound or guarding, ND, BS present in all quadrants, no midline spine or CVA ttp, no leg edema or unilateral calf swelling, A&Ox3, no focal neuro deficits, nml mood and affect.  Plan: Labs, imaging, reassess. Improved.

## 2024-08-02 RX ORDER — ERENUMAB-AOOE 70 MG/ML
70 INJECTION SUBCUTANEOUS
Qty: 3 | Refills: 3 | Status: ACTIVE | COMMUNITY
Start: 2024-08-01 | End: 1900-01-01

## 2024-08-05 ENCOUNTER — APPOINTMENT (OUTPATIENT)
Age: 39
End: 2024-08-05

## 2024-08-05 PROBLEM — R31.29 MICROHEMATURIA: Status: ACTIVE | Noted: 2024-08-05

## 2024-08-05 PROCEDURE — 99214 OFFICE O/P EST MOD 30 MIN: CPT

## 2024-08-05 PROCEDURE — G2211 COMPLEX E/M VISIT ADD ON: CPT | Mod: NC,1L

## 2024-08-05 PROCEDURE — 99204 OFFICE O/P NEW MOD 45 MIN: CPT

## 2024-08-11 NOTE — REASON FOR VISIT
37 wks in labor and hx of c/s
[Initial Visit ___] : [unfilled] is here today for an initial visit  for [unfilled]
[Initial Visit ___] : [unfilled] is here today for an initial visit  for [unfilled]

## 2024-08-11 NOTE — HISTORY OF PRESENT ILLNESS
[Suprapubic] : suprapubic area [Left Flank] : left flank [Right Flank] : right flank [Dull] : dull [Gradual] : gradual [___ Week(s) Ago] : [unfilled] week(s) ago [Constant] : constantly [Worsening] : worsening [None] : No relieving factors are noted [FreeTextEntry1] : 38 yo female with PMH of DM1, fatty liver disease is a new patient referred by her GYN for hematuria.  Patient presented to the ER on 07/29/2024 with complaints of lower back pain radiating to her abdomen.  With dysuria she was discharged home with an antibiotic prescription.  Documents, labs, and imaging were reviewed by me and discussed with the patient.  Labs 07/29/2024: -Creatinine 0.7 -UA: >1000 glucose, negative nitrite, negative leuk, negative blood  CT 07/29/2024: -KIDNEYS/URETERS: Enhance symmetrically without hydroureteronephrosis. -BLADDER: Within normal limits.  Per patient, pain started 3 weeks ago.  She does not take any medication for the pain.  Patient being treated for potential UTI with Macrobid for the past 7 days,  Patient also being treated for vaginal yeast infection by OBGYN. [Urinary Urgency] : no urinary urgency [Urinary Frequency] : no urinary frequency [Dysuria] : no dysuria [Hematuria - Gross] : no gross hematuria

## 2024-08-11 NOTE — ASSESSMENT
[FreeTextEntry1] : 40 yo female with PMH of DM1, fatty liver disease referred by GYN for hematuria. She had a recent presentation of back pain radiating to her abdomen.  ER documents, labs, and imaging reviewed by me and discussed with the patient as noted above. CT scan had IV contrast.  There was no obvious kidney stones seen. Ordered U/A and UCx  Follow up in 3 weeks

## 2024-08-11 NOTE — ASSESSMENT
[FreeTextEntry1] : 38 yo female with PMH of DM1, fatty liver disease referred by GYN for hematuria. She had a recent presentation of back pain radiating to her abdomen.  ER documents, labs, and imaging reviewed by me and discussed with the patient as noted above. CT scan had IV contrast.  There was no obvious kidney stones seen. Ordered U/A and UCx  Follow up in 3 weeks

## 2024-08-12 ENCOUNTER — APPOINTMENT (OUTPATIENT)
Dept: INTERNAL MEDICINE | Facility: CLINIC | Age: 39
End: 2024-08-12
Payer: MEDICAID

## 2024-08-12 VITALS
HEART RATE: 78 BPM | DIASTOLIC BLOOD PRESSURE: 73 MMHG | WEIGHT: 195 LBS | HEIGHT: 64 IN | OXYGEN SATURATION: 99 % | TEMPERATURE: 97.7 F | BODY MASS INDEX: 33.29 KG/M2 | SYSTOLIC BLOOD PRESSURE: 110 MMHG

## 2024-08-12 DIAGNOSIS — K21.9 GASTRO-ESOPHAGEAL REFLUX DISEASE W/OUT ESOPHAGITIS: ICD-10-CM

## 2024-08-12 DIAGNOSIS — M54.16 RADICULOPATHY, LUMBAR REGION: ICD-10-CM

## 2024-08-12 DIAGNOSIS — E11.9 TYPE 2 DIABETES MELLITUS W/OUT COMPLICATIONS: ICD-10-CM

## 2024-08-12 DIAGNOSIS — Z00.00 ENCOUNTER FOR GENERAL ADULT MEDICAL EXAMINATION W/OUT ABNORMAL FINDINGS: ICD-10-CM

## 2024-08-12 DIAGNOSIS — I10 ESSENTIAL (PRIMARY) HYPERTENSION: ICD-10-CM

## 2024-08-12 PROCEDURE — 99214 OFFICE O/P EST MOD 30 MIN: CPT

## 2024-08-12 NOTE — PHYSICAL EXAM
[No Acute Distress] : no acute distress [Well Nourished] : well nourished [Normal Sclera/Conjunctiva] : normal sclera/conjunctiva [Normal Outer Ear/Nose] : the outer ears and nose were normal in appearance [Normal Oropharynx] : the oropharynx was normal [Normal Rate] : normal rate  [Soft] : abdomen soft [Non Tender] : non-tender [Non-distended] : non-distended [No HSM] : no HSM [Normal Bowel Sounds] : normal bowel sounds

## 2024-08-13 ENCOUNTER — APPOINTMENT (OUTPATIENT)
Dept: PSYCHIATRY | Facility: CLINIC | Age: 39
End: 2024-08-13
Payer: MEDICAID

## 2024-08-13 PROCEDURE — 99214 OFFICE O/P EST MOD 30 MIN: CPT | Mod: 95

## 2024-08-13 NOTE — PHYSICAL EXAM
[WNL] : within normal limits [Avoidant] : avoidant [Anxious] : anxious [Flat] : flat [de-identified] : occasional shaking i [de-identified] : mute [de-identified] : poor [de-identified] : poor

## 2024-08-13 NOTE — SOCIAL HISTORY
[FreeTextEntry1] : Father  in .  Pt lives with mother in assisted living ([facility]). Pt did not finish high school due to severe depression.

## 2024-08-13 NOTE — DISCUSSION/SUMMARY
[FreeTextEntry1] : Pt with chronic depression and anxiety.  Pt presents in an anxious regressed state.  Per mother seeking to change meds but offers no logical rationale.  We rescheduled for next week, to see if she will participate in a discussion about the issue.

## 2024-08-13 NOTE — REASON FOR VISIT
[Patient] : Patient [Other Location: e.g. School (Enter Location, City,State)___] : at [unfilled], at the time of the visit. [Other Location: e.g. Home (Enter Location, City,State)___] : at [unfilled] [Mother] : mother [FreeTextEntry1] : Medication management. [FreeTextEntry3] : Pt's mother

## 2024-08-13 NOTE — RISK ASSESSMENT
[Low acute suicide risk] : Low acute suicide risk [Yes] : Yes [FreeTextEntry9] : Pt unwilling to answer questions today but there is no outward evidence of an acute safety issue.

## 2024-08-13 NOTE — HISTORY OF PRESENT ILLNESS
[Suicidal Behavior/Ideation] : suicidal behavior/ideation [FreeTextEntry1] : Pt seen in follow-up.  She came to the hub with her mother.  Pt was in the midst of a panic attack, and mother had to wheel her in on the walker seat.  Pt had been in a similar state when we had our first meeting, but she was able to gather herself and participate appropriately.  Pt was not able to gather herself today.  She sat mute, and occasionally shook her arms.  Per mother, pt was conversing with her just prior to the visit, so there is no reason to suspect there has been any acute medical or neurological event.  Mother said pt was very anxious about meeting me, as she intended to ask me to take her off of the Prozac.  Per mother pt complains it is the cause of her sx today - along with a number of other psychiatric sx, all of which actually predated the start of the Prozac.  At last visit, on the current dose, pt presented as calm and euthymic, and she denied any side effects.  It may well be true that the Prozac is not heling as much as she would like.  I asked that she speak with me so that we could actually discuss all of this, but she sat persistently mute until the end of our allotted meeting time.  We were left with no choice but to simply reschedule for next week.  I instructed mother to take her to the ER if she had any concerns about pt medically or with regard to safety.  She said she did not.  Presentation today appears to be a display of her character pathology. [FreeTextEntry2] : First treatment for depression and suicidality in late teens Approximately 8 IPP stays since then - the last in April 2024. Chronic suicidal thinking, with hx of past attempts.  Reportedly drank bleach/Pine-sol after father's death in 11/2022; prior to this patient has history of 3 remote attempts in her teens - drinking acetone, drinking hydrogen peroxide, and half bottle of ibuprofen. Pt has hx of ER visit for pseudo-seizures. [FreeTextEntry3] : Celexa, Cymbalta, Buspar, Zyprexa, Gabapentin (hives) - none were effective. Lexapro - ineffective Lamictal - fatigue

## 2024-08-13 NOTE — PLAN
[Medication education provided] : Medication education provided. [Rationale for medication choices, possible risks/precautions, benefits, alternative treatment choices, and consequences of non-treatment discussed] : Rationale for medication choices, possible risks/precautions, benefits, alternative treatment choices, and consequences of non-treatment discussed with patient/family/caregiver  [FreeTextEntry4] : Medication management to mitigate active depressive and anxiety sx. [FreeTextEntry5] : Continue Prozac at 40mg daily Individual therapy RTC 1 week

## 2024-08-14 NOTE — HISTORY OF PRESENT ILLNESS
[FreeTextEntry1] : Follow up visit [de-identified] : 38 year old female with a PMH of chronic pelvic pain, Type 1 DM with chronic polyneuropathy of the lower extremities on an insulin pump, recurrent UTIs, PCOS, GERD, anxiety, pseudoseizures, LLL Pulmonary nodule?; bipolar disorder, x4 prior suicidal attempts; carpal tunnel, suspected CELINE, and HTn recently started amlodipine presents for follow up on BP.   She kept a blood pressure log for 2 months: readings range from 100s to 130s/70s to 80s. She is compliant with meds.

## 2024-08-14 NOTE — REVIEW OF SYSTEMS
[Nausea] : nausea [Fever] : no fever [Chills] : no chills [Chest Pain] : no chest pain [Palpitations] : no palpitations [Shortness Of Breath] : no shortness of breath [Wheezing] : no wheezing [Cough] : no cough [Abdominal Pain] : no abdominal pain [Constipation] : no constipation [Diarrhea] : diarrhea [Vomiting] : no vomiting [Heartburn] : no heartburn [Melena] : no melena [Dysuria] : no dysuria [Incontinence] : no incontinence [Frequency] : no frequency [Joint Pain] : no joint pain [Headache] : no headache

## 2024-08-14 NOTE — HISTORY OF PRESENT ILLNESS
[FreeTextEntry1] : Follow up visit [de-identified] : 38 year old female with a PMH of chronic pelvic pain, Type 1 DM with chronic polyneuropathy of the lower extremities on an insulin pump, recurrent UTIs, PCOS, GERD, anxiety, pseudoseizures, LLL Pulmonary nodule?; bipolar disorder, x4 prior suicidal attempts; carpal tunnel, suspected CELINE, and HTn recently started amlodipine presents for follow up on BP.   She kept a blood pressure log for 2 months: readings range from 100s to 130s/70s to 80s. She is compliant with meds.

## 2024-08-14 NOTE — PLAN
[FreeTextEntry1] : 38 year old female with a PMH of chronic pelvic pain, Type 1 DM with chronic polyneuropathy of the lower extremities on an insulin pump, recurrent UTIs, PCOS, GERD, anxiety, pseudo-seizures, LLL Pulmonary nodule; bipolar disorder, x4 prior suicidal attempts; carpal tunnel, and suspected CELINE presents for ear pain  #HTN - Started Amlodipine 5 mg daily at last visit in June - BP log for past 2 months => ranging from 100-120/70-80s - BP in clinic today 110/73 - Continue Amlodipine   #Type 1 DM - A1c: 10.3 (09/23) - Follows up with endocrinology Dr. Galvez - On insulin pump  #Hematuria - CT 07/29/2024: KIDNEYS/URETERS: Enhance symmetrically without hydroureteronephrosis. BLADDER: Within normal limits. - Following with uro last seen in Aug 2024, follow up in 3 weeks with UA and UCx  #Ear pain, resolved - Culture grew Numerous Pseudomonas aeruginosa - Saw ENT 7/10/24: Ear infection resolved. Switch flonase to azelastine (2 sprays each nare once a day) for post-nasal drip   # GERD - c/w omeprazole 40mg qd  #pulmonary nodule? #Suspected CELINE #Chronic chest pain and palpitations - Follows Dr. Qureshi for pulm - follows Dr. Christian for cardio- had normal ECG and TTE recently per pt  #Depression/Anxiety #Hx of panic attacks - Follows with psych, last seen July 2024, continue Prozac 40mg daily   #Obesity #JOEL new diagnosis #suspected IBS per hepatology - diet and exercise - follows with GI and Hepatology here - hasn't wanted GLP-1   HCM - Follows GYN, optho and podiatry regularly - Called [facility] to have labwork faxed over => they will fax it to medical records and clinic will get it from there once it reaches - Routine labs before next visit  - RTC in 3 months and PRN

## 2024-08-16 ENCOUNTER — APPOINTMENT (OUTPATIENT)
Dept: PSYCHIATRY | Facility: CLINIC | Age: 39
End: 2024-08-16

## 2024-08-16 NOTE — PHYSICAL EXAM
[Cooperative] : cooperative [Depressed] : depressed [Full] : full [Clear] : clear [Linear/Goal Directed] : linear/goal directed [None] : none [None Reported] : none reported [Average] : average [WNL] : within normal limits [FreeTextEntry8] : Patient was laughing and using humor towards the end of today's session.  [de-identified] : Fair

## 2024-08-16 NOTE — REASON FOR VISIT
[Patient] : Patient [FreeTextEntry1] : Patient is a 39 year old female seen for a follow-up therapy session, 1:30 PM to 2:00 PM.

## 2024-08-16 NOTE — PLAN
[Irmo Therapy] : Irmo Therapy  [Supportive Therapy] : Supportive Therapy [Recommended Frequency of Visits: ____] : Recommended frequency of visits: [unfilled] [Return in ____ week(s)] : Return in [unfilled] week(s) [FreeTextEntry2] : Goal #1" I want to feel better and manage my stress."   Objective #1 Patient will keep regular therapy sessions, identify triggers of mood disturbance or emotional dysregulation.   Objective #2 Patient will learn and utilize positive coping, bereavement, stress management, and CBT/DBT methods, including DBT, bereavement or IOP groups.     Goal #2 Manage medical issues. " I want to be healthy."  Obj#1   Patient will comply with all medical and psychiatric treatment recommendations.      [de-identified] : Patient complained of feeling depressed and tired. She continues to sleep a lot, sleeps through luch, and wakes up late in the afternoon. She reports that she has gone out to the store only a few times since last session. She reports that she was in the ER on 7/29 due to pain in her abdomen and back, which seemed like kidney stone symptoms, but she had a UTI and a yeast infection.  She was given an antibiotic, which she has finished taking. She reports that she will be following up again with her Urologist and GYN. She reports that she continues to get migraines daily even though she is taking Topamax. She reports that she was told that Topamax can cause kidney stones, therefore, her Neurologist will be weaning her off of it and starting her on Aimovig once a month injection, as well as Nurtec. She expressed worries about her depression getting worse. She denies any self-harm thoughts. She thinks that she is depressed and so tired related to the Prozac 40 mg. She reports that she could not talk at her session with her psychiatrist, this past Tuesday, due to having a severe panic attack. The session was rescheduled to next Tuesday. Helped patient identify triggers of panic attacks and depression, including cognitive ones.  Madhav reports that her home environment is depressing since a lot of the residents are depressed and use illegal substances. She reports that today is the one-year anniversary of her grandfather's death, and in a few days, it would have been her father's birthday. Support provided. Spoke about positive coping methods, such as participation in recreational activities at her residence. and her [employer] and[employer]. She then started to smile and use humor, reporting positive interaction with an older man at her residence, and positive memories of her father and grandfather. Encouraged her to work on readjusting her sleep schedule, such as not going back to sleep after breakfast, and staying awake for lunch. She reports that she will be getting an MRI next Thursday and be evaluated for physical therapy on 8/28.  [FreeTextEntry1] : Patient will focus on positive coping, stress management, relaxation, and CBT methods. She will continue medication regimen, follow-up with psychiatrist on 8/20, and therapy on 8/30.

## 2024-08-17 NOTE — ED ADULT NURSE NOTE - IS THE PATIENT ABLE TO BE SCREENED?
Anatomic Location From Referring Provider: left medial breast
X Size Of Lesion In Cm (Optional): 1
Detail Level: Detailed
Size Of Lesion: 0.6
Yes

## 2024-08-19 ENCOUNTER — APPOINTMENT (OUTPATIENT)
Dept: PODIATRY | Facility: CLINIC | Age: 39
End: 2024-08-19

## 2024-08-19 ENCOUNTER — LABORATORY RESULT (OUTPATIENT)
Age: 39
End: 2024-08-19

## 2024-08-19 ENCOUNTER — OUTPATIENT (OUTPATIENT)
Dept: OUTPATIENT SERVICES | Facility: HOSPITAL | Age: 39
LOS: 1 days | End: 2024-08-19
Payer: MEDICAID

## 2024-08-19 DIAGNOSIS — B35.1 TINEA UNGUIUM: ICD-10-CM

## 2024-08-19 DIAGNOSIS — Z00.00 ENCOUNTER FOR GENERAL ADULT MEDICAL EXAMINATION WITHOUT ABNORMAL FINDINGS: ICD-10-CM

## 2024-08-19 DIAGNOSIS — E11.8 TYPE 2 DIABETES MELLITUS WITH UNSPECIFIED COMPLICATIONS: ICD-10-CM

## 2024-08-19 DIAGNOSIS — G89.29 PAIN IN RIGHT FOOT: ICD-10-CM

## 2024-08-19 DIAGNOSIS — M79.671 PAIN IN RIGHT FOOT: ICD-10-CM

## 2024-08-19 DIAGNOSIS — E10.9 TYPE 1 DIABETES MELLITUS W/OUT COMPLICATIONS: ICD-10-CM

## 2024-08-19 PROCEDURE — 87086 URINE CULTURE/COLONY COUNT: CPT

## 2024-08-19 PROCEDURE — 99214 OFFICE O/P EST MOD 30 MIN: CPT | Mod: 95

## 2024-08-19 PROCEDURE — 99214 OFFICE O/P EST MOD 30 MIN: CPT

## 2024-08-20 ENCOUNTER — APPOINTMENT (OUTPATIENT)
Dept: PSYCHIATRY | Facility: CLINIC | Age: 39
End: 2024-08-20
Payer: MEDICAID

## 2024-08-20 ENCOUNTER — OUTPATIENT (OUTPATIENT)
Dept: OUTPATIENT SERVICES | Facility: HOSPITAL | Age: 39
LOS: 1 days | End: 2024-08-20
Payer: MEDICAID

## 2024-08-20 DIAGNOSIS — F60.3 BORDERLINE PERSONALITY DISORDER: ICD-10-CM

## 2024-08-20 DIAGNOSIS — F41.0 PANIC DISORDER [EPISODIC PAROXYSMAL ANXIETY]: ICD-10-CM

## 2024-08-20 DIAGNOSIS — F41.9 ANXIETY DISORDER, UNSPECIFIED: ICD-10-CM

## 2024-08-20 DIAGNOSIS — F32.A DEPRESSION, UNSPECIFIED: ICD-10-CM

## 2024-08-20 PROCEDURE — 99214 OFFICE O/P EST MOD 30 MIN: CPT | Mod: 95

## 2024-08-21 DIAGNOSIS — F41.0 PANIC DISORDER [EPISODIC PAROXYSMAL ANXIETY]: ICD-10-CM

## 2024-08-21 DIAGNOSIS — F41.9 ANXIETY DISORDER, UNSPECIFIED: ICD-10-CM

## 2024-08-21 DIAGNOSIS — F60.3 BORDERLINE PERSONALITY DISORDER: ICD-10-CM

## 2024-08-21 DIAGNOSIS — F32.A DEPRESSION, UNSPECIFIED: ICD-10-CM

## 2024-08-21 NOTE — PHYSICAL EXAM
[Avoidant] : avoidant [Anxious] : anxious [Flat] : flat [de-identified] : occasional shaking i [de-identified] : mute [Average] : average [Cooperative] : cooperative [Depressed] : depressed [Full] : full [Clear] : clear [Linear/Goal Directed] : linear/goal directed [WNL] : within normal limits [de-identified] : limited [de-identified] : fair

## 2024-08-21 NOTE — HISTORY OF PRESENT ILLNESS
[Suicidal Behavior/Ideation] : suicidal behavior/ideation [FreeTextEntry1] : Pt seen in follow-up.  She presents as her usual self today and without her mother.  Pt cannot really explain her state last time, aside from being extremely anxious.  Pt says she is getting escalation to panic sx 3-4 times a week now.  The Prozac does not appear to have done anything to mitigate her anxiety sx, and they may actually be worse.  She also says she is depressed, though she presents today with full/bright affect - able to laugh and joke. She continues to feel sleepy during the day, so the Prozac has not helped in that regard either.  Still oversleeps on most days and this continues to have a negative impact on her glucose control.  This seems increasingly to be a behavioral issue, but pt would like to see if a change in meds might help with it.  She is especially interested in switching to an SNRI, and this seems reasonable.  She has been on Cymbalta before, but this was at least a decade ago, and it was started for neuropathic pain, not for psychiatric purposes. It did not help with the pain, so it was discontinued.  Pt does not recall any tolerability issues, and she would like to proceed with a new trial. Potential risks and benefit discussed.  Pt also notes that she is tapering off of Topamax due to intolerable side effects.  A new regimen is to be started for migaines. [FreeTextEntry2] : First treatment for depression and suicidality in late teens Approximately 8 IPP stays since then - the last in April 2024. Chronic suicidal thinking, with hx of past attempts.  Reportedly drank bleach/Pine-sol after father's death in 11/2022; prior to this patient has history of 3 remote attempts in her teens - drinking acetone, drinking hydrogen peroxide, and half bottle of ibuprofen. Pt has hx of ER visit for pseudo-seizures. [FreeTextEntry3] : Celexa, Cymbalta, Buspar, Zyprexa, Gabapentin (hives) - none were effective. Lexapro - ineffective Lamictal - fatigue

## 2024-08-21 NOTE — DISCUSSION/SUMMARY
[FreeTextEntry1] : Pt with chronic depression and anxiety.  Pt presents at recent baseline but feels Prozac has been ineffective.  She would like a switch to Cymbalta.

## 2024-08-21 NOTE — RISK ASSESSMENT
[Low acute suicide risk] : Low acute suicide risk [Yes] : Yes [No, patient denies ideation or behavior] : No, patient denies ideation or behavior [FreeTextEntry9] : No indication of any danger to self or others.

## 2024-08-21 NOTE — PLAN
[Medication education provided] : Medication education provided. [Rationale for medication choices, possible risks/precautions, benefits, alternative treatment choices, and consequences of non-treatment discussed] : Rationale for medication choices, possible risks/precautions, benefits, alternative treatment choices, and consequences of non-treatment discussed with patient/family/caregiver  [FreeTextEntry4] : Medication management to mitigate active depressive and anxiety sx. [FreeTextEntry5] : Decrease Prozac to 20mg daily one week, then D/C Start Cymbalta at 20mg daily Individual therapy RTC 1 week

## 2024-08-21 NOTE — SOCIAL HISTORY
[FreeTextEntry1] : Father  in .  Pt lives with mother in assisted living ([facility]). Pt did not finish high school due to severe depression.

## 2024-08-22 ENCOUNTER — RESULT REVIEW (OUTPATIENT)
Age: 39
End: 2024-08-22

## 2024-08-26 ENCOUNTER — OUTPATIENT (OUTPATIENT)
Facility: HOSPITAL | Age: 39
LOS: 1 days | End: 2024-08-26
Payer: MEDICAID

## 2024-08-26 ENCOUNTER — APPOINTMENT (OUTPATIENT)
Age: 39
End: 2024-08-26
Payer: MEDICAID

## 2024-08-26 VITALS
TEMPERATURE: 97.6 F | WEIGHT: 195 LBS | BODY MASS INDEX: 33.29 KG/M2 | HEIGHT: 64 IN | HEART RATE: 81 BPM | SYSTOLIC BLOOD PRESSURE: 119 MMHG | DIASTOLIC BLOOD PRESSURE: 74 MMHG | OXYGEN SATURATION: 98 %

## 2024-08-26 DIAGNOSIS — Z00.00 ENCOUNTER FOR GENERAL ADULT MEDICAL EXAMINATION WITHOUT ABNORMAL FINDINGS: ICD-10-CM

## 2024-08-26 DIAGNOSIS — R31.29 OTHER MICROSCOPIC HEMATURIA: ICD-10-CM

## 2024-08-26 PROCEDURE — 99212 OFFICE O/P EST SF 10 MIN: CPT

## 2024-08-26 RX ORDER — INSULIN LISPRO 100 U/ML
INJECTION, SOLUTION INTRAVENOUS; SUBCUTANEOUS
Refills: 0 | Status: ACTIVE | COMMUNITY

## 2024-08-27 ENCOUNTER — OUTPATIENT (OUTPATIENT)
Dept: OUTPATIENT SERVICES | Facility: HOSPITAL | Age: 39
LOS: 1 days | End: 2024-08-27
Payer: MEDICAID

## 2024-08-27 ENCOUNTER — APPOINTMENT (OUTPATIENT)
Dept: PSYCHIATRY | Facility: CLINIC | Age: 39
End: 2024-08-27
Payer: MEDICAID

## 2024-08-27 DIAGNOSIS — F41.9 ANXIETY DISORDER, UNSPECIFIED: ICD-10-CM

## 2024-08-27 DIAGNOSIS — F41.1 GENERALIZED ANXIETY DISORDER: ICD-10-CM

## 2024-08-27 DIAGNOSIS — F32.A DEPRESSION, UNSPECIFIED: ICD-10-CM

## 2024-08-27 DIAGNOSIS — F41.0 GENERALIZED ANXIETY DISORDER: ICD-10-CM

## 2024-08-27 PROCEDURE — 99214 OFFICE O/P EST MOD 30 MIN: CPT | Mod: 95

## 2024-08-27 RX ORDER — DULOXETINE HYDROCHLORIDE 30 MG/1
30 CAPSULE, DELAYED RELEASE PELLETS ORAL
Qty: 30 | Refills: 0 | Status: ACTIVE | COMMUNITY
Start: 2024-08-20 | End: 1900-01-01

## 2024-08-27 NOTE — PHYSICAL EXAM
[General Appearance - Alert] : alert [General Appearance - In No Acute Distress] : in no acute distress [Ankle Swelling (On Exam)] : present [Ankle Swelling Bilaterally] : bilaterally  [Ankle Swelling On The Right] : mild [Varicose Veins Of Lower Extremities] : not present [Delayed in the Right Toes] : capillary refills normal in right toes [Delayed in the Left Toes] : capillary refills normal in the left toes [FreeTextEntry3] : DP/PT pulses palpable [de-identified] : Varus deformity, rigid left foot Extensus hallux, rigid right foot [Skin Turgor] : normal skin turgor [] : no rash [Skin Lesions] : no skin lesions [Foot Ulcer] : no foot ulcer [Diminished Throughout Right Foot] : normal sensation with monofilament testing throughout right foot [Diminished Throughout Left Foot] : normal sensation with monofilament testing throughout left foot [FreeTextEntry1] : Light and gross touch sensation intact

## 2024-08-28 DIAGNOSIS — F32.A DEPRESSION, UNSPECIFIED: ICD-10-CM

## 2024-08-28 DIAGNOSIS — F41.9 ANXIETY DISORDER, UNSPECIFIED: ICD-10-CM

## 2024-08-28 NOTE — RISK ASSESSMENT
[No, patient denies ideation or behavior] : No, patient denies ideation or behavior [Low acute suicide risk] : Low acute suicide risk [Yes] : Yes [FreeTextEntry9] : No indication of any danger to self or others.

## 2024-08-28 NOTE — PLAN
[Medication education provided] : Medication education provided. [Rationale for medication choices, possible risks/precautions, benefits, alternative treatment choices, and consequences of non-treatment discussed] : Rationale for medication choices, possible risks/precautions, benefits, alternative treatment choices, and consequences of non-treatment discussed with patient/family/caregiver  [FreeTextEntry4] : Medication management to mitigate active depressive and anxiety sx. [FreeTextEntry5] : D/C Prozac Increase Cymbalta to 30mg daily Individual therapy RTC 2 weeks

## 2024-08-28 NOTE — PHYSICAL EXAM
[Average] : average [Cooperative] : cooperative [Depressed] : depressed [Full] : full [Clear] : clear [Linear/Goal Directed] : linear/goal directed [WNL] : within normal limits [Anxious] : anxious [de-identified] : fair [de-identified] : fair

## 2024-08-28 NOTE — HISTORY OF PRESENT ILLNESS
[Suicidal Behavior/Ideation] : suicidal behavior/ideation [FreeTextEntry1] : Pt seen in follow-up.  At last visit, due to apparent ineffectiveness of Prozac trial, we began a cross-titration to Cymbalta.  Pt lowered the Prozac to 20mg and started the Cymbalta at the same dose.  She has not noticed any side effects or new somatic sx over the past week.  She notes that she did have one panic attack, when she was getting an MRI of her back.  She had been getting panic attack, however, before the switch.  We discussed the possibility of a single benzo dose prior to any future studies likely to trigger anxiety.  She continues to complain of depression, though she again presents in very good spirits.  She is able to laugh and joke.  It remains somewhat unclear if pt's expectations for results of an antidepressant trial are actually attainable.  It seems likely that the combination of her living situation, health problems and underlying character pathology may perpetuate her sx regardless of medication. [FreeTextEntry2] : First treatment for depression and suicidality in late teens Approximately 8 IPP stays since then - the last in April 2024. Chronic suicidal thinking, with hx of past attempts.  Reportedly drank bleach/Pine-sol after father's death in 11/2022; prior to this patient has history of 3 remote attempts in her teens - drinking acetone, drinking hydrogen peroxide, and half bottle of ibuprofen. Pt has hx of ER visit for pseudo-seizures. [FreeTextEntry3] : Celexa, Cymbalta, Buspar, Zyprexa, Gabapentin (hives) - none were effective. Lexapro - ineffective Lamictal - fatigue

## 2024-08-28 NOTE — DISCUSSION/SUMMARY
[FreeTextEntry1] : Pt with chronic depression and anxiety.  Pt tolerating switch to Cymbalta thus far.  Will continue titration.

## 2024-08-28 NOTE — SOCIAL HISTORY
[FreeTextEntry1] : Father  in .  Pt lives with mother in assisted living ([facility]). Pt did not finish high school due to severe depression.

## 2024-08-28 NOTE — SOCIAL HISTORY
[FreeTextEntry1] : Father  in .  Pt lives with mother in assisted living ([facility]). Pt did not finish high school due to severe depression.

## 2024-08-28 NOTE — PHYSICAL EXAM
[Average] : average [Cooperative] : cooperative [Depressed] : depressed [Full] : full [Clear] : clear [Linear/Goal Directed] : linear/goal directed [WNL] : within normal limits [Anxious] : anxious [de-identified] : fair [de-identified] : fair

## 2024-08-30 ENCOUNTER — NON-APPOINTMENT (OUTPATIENT)
Age: 39
End: 2024-08-30

## 2024-08-30 ENCOUNTER — APPOINTMENT (OUTPATIENT)
Dept: PSYCHIATRY | Facility: CLINIC | Age: 39
End: 2024-08-30

## 2024-08-30 ENCOUNTER — OUTPATIENT (OUTPATIENT)
Dept: OUTPATIENT SERVICES | Facility: HOSPITAL | Age: 39
LOS: 1 days | End: 2024-08-30
Payer: MEDICAID

## 2024-08-30 DIAGNOSIS — E10.9 TYPE 1 DIABETES MELLITUS WITHOUT COMPLICATIONS: ICD-10-CM

## 2024-08-30 DIAGNOSIS — F41.1 GENERALIZED ANXIETY DISORDER: ICD-10-CM
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
## 2024-08-30 DIAGNOSIS — B35.1 TINEA UNGUIUM: ICD-10-CM

## 2024-08-30 DIAGNOSIS — F41.0 PANIC DISORDER [EPISODIC PAROXYSMAL ANXIETY]: ICD-10-CM

## 2024-08-30 DIAGNOSIS — M79.671 PAIN IN RIGHT FOOT: ICD-10-CM

## 2024-08-30 DIAGNOSIS — E11.8 TYPE 2 DIABETES MELLITUS WITH UNSPECIFIED COMPLICATIONS: ICD-10-CM
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
## 2024-08-30 DIAGNOSIS — F32.A DEPRESSION, UNSPECIFIED: ICD-10-CM

## 2024-08-30 PROCEDURE — 90832 PSYTX W PT 30 MINUTES: CPT

## 2024-08-30 NOTE — HISTORY OF PRESENT ILLNESS
Called wife, was able to sched pat on Wednesday next week   [FreeTextEntry1] : 6 month follow-up [de-identified] : 36yo female with a past medical history of chronic pelvic pain secondary to vulvodynia/spasm s/p pudendal nerve block with no past improvement (recommended spinal stimulator but patient refused it, then estrogen cream was recommended but patient started experiencing breast tenderness and discontinued it), Type 1 DM with chronic polyneuropathy of the lower extremities on an insulin pump, recurrent UTIs, PCOS (elevated testosterone level of 66), GERD, anxiety, and migraines who presents for a follow up visit.  Patient was last admitted to [facility] in March 2022 for DKA and recently treated in the ED for mild hyperglycemia on 5/13/22.  She then went to the ED at [facility] and [facility].  CT chest on 5/15 was negative for acute pathology.  Patient was last seen in Medicine clinic in Nov 2021.\par \par She follows routinely with Endocrine, Ophtho, and Podiatry. Also follows w/ psych for anxiety.\par \par Was previously following with Uro-gyn for vulvodynia, but stopped following due to no therapies or treatment being offered. Was recommend to go to [place] to a vulvodynia specialist, however, cannot go there due to transportation issues because she lives in group home.

## 2024-08-30 NOTE — PHYSICAL EXAM
[Cooperative] : cooperative [Euthymic] : euthymic [Full] : full [Clear] : clear [Linear/Goal Directed] : linear/goal directed [None] : none [None Reported] : none reported [Average] : average [WNL] : within normal limits [FreeTextEntry8] :  "Still feel depressed"  [de-identified] : Fair

## 2024-08-30 NOTE — REASON FOR VISIT
[Patient] : Patient [FreeTextEntry1] : Patient is a 39-year-old female seen for a follow-up therapy session, 1:10 PM to 1:40 PM.

## 2024-08-30 NOTE — PLAN
[FreeTextEntry2] : Goal #1" I want to feel better and manage my stress."   Objective #1 Patient will keep regular therapy sessions, identify triggers of mood disturbance or emotional dysregulation.   Objective #2 Patient will learn and utilize positive coping, bereavement, stress management, and CBT/DBT methods, including DBT, bereavement or IOP groups.     Goal #2 Manage medical issues. " I want to be healthy."  Obj#1   Patient will comply with all medical and psychiatric treatment recommendations.      [[facility]] : [facility]  [Supportive Therapy] : Supportive Therapy [de-identified] : Patient was able to use humor today, but she reports that she still feels depressed related to losses and adult home environment. Patient reports that one of the female residents was expressing her depressive feelings and comments about not wanting to live. Patient reports that she wants to be helpful, and wants the woman to know she is not alone, but hearing about her depression and thoughts of not wanting to live is a trigger for her. She reports that her friends recommended that she walk away. She was also advised to recommend profession help for the woman. She agrees that this would be helpful. She reports that she found a new hobby, [employer], which has been keeping her busy and distracted for many hours. She reports that it is also relaxing, motivating, and she feels a sense of accomplishment after she makes a castellon chain from [employer]. She reports getting to sleep very late doing this but has been making efforts to still wake up for breakfast, and to stay up the rest of the day. Reinforced positive coping method to get through her father's birthday by releasing balloons and eating his favorite cake. She reports that talking to and complementing one of the elderly male residents causes her to feel good, and she smiles or laughs when she thinks of him. She reports that she was approved for social security disability due to being partially disabled. She reports that the menu will be changed at her residence to cater to a diabetic diet since they hired someone from that worked at the hospital dietary dept. She reports that she had her session with her psychiatrist, Dr. Curiel, last week. She reports that he discontinued the Prozac and increased the dosage of the Cymbalta to 30 mg. She reports that she was told by her medical doctor that she does not need to take the blood pressure medication, since her blood pressure has been stable. She reports that she is still feeling back and leg pain. She reports that she was not able to complete her entire MRI for her back due to her anxiety and panic symptoms. She reports that she completed 25 minutes of it. She reports that she will be starting physical therapy.  [Recommended Frequency of Visits: ____] : Recommended frequency of visits: [unfilled] [Return in ____ week(s)] : Return in [unfilled] week(s) [FreeTextEntry1] : Patient will focus on positive coping, stress management, relaxation, and grieving methods. She will continue medication regimen and follow-up with therapy on 9/13.

## 2024-08-31 DIAGNOSIS — F41.0 PANIC DISORDER [EPISODIC PAROXYSMAL ANXIETY]: ICD-10-CM

## 2024-08-31 DIAGNOSIS — F41.1 GENERALIZED ANXIETY DISORDER: ICD-10-CM

## 2024-08-31 DIAGNOSIS — F32.A DEPRESSION, UNSPECIFIED: ICD-10-CM

## 2024-08-31 NOTE — ASSESSMENT
[FreeTextEntry1] : 38 yo female with PMH of DM1, fatty liver disease was referred by her GYN for hematuria.  Reviewed results with patient as noted above. Workup has been negative.  Follow-up as needed.

## 2024-08-31 NOTE — HISTORY OF PRESENT ILLNESS
[FreeTextEntry1] : 40 yo female with PMH of DM1, fatty liver disease was initially referred by her GYN for hematuria.  To review from her initial visit on 08/05/2024: Patient presented to the ER on 07/29/2024 with complaints of lower back pain radiating to her abdomen. With dysuria she was discharged home with an antibiotic prescription. Documents, labs, and imaging were reviewed by me and discussed with the patient.  Labs 07/29/2024: -Creatinine 0.7 -UA: >1000 glucose, negative nitrite, negative leuk, negative blood  CT 07/29/2024: -KIDNEYS/URETERS: Enhance symmetrically without hydroureteronephrosis. -BLADDER: Within normal limits.  Per patient, pain started 3 weeks ago. She does not take any medication for the pain. Patient being treated for potential UTI with Macrobid for the past 7 days, Patient also being treated for vaginal yeast infection by OBGYN.   Patient is currently experiencing no urinary urgency, no urinary frequency, no dysuria and no gross hematuria. Pain is located in the suprapubic area, left flank and right flank. The patient describes the pain as dull. Onset was gradual 3 week(s) ago. The symptoms occur constantly. She describes this as worsening. No relieving factors are noted.   Today's clinic visit on 08/26/2024:  Reviewed labs and discussed with patient.  Labs 08/19/2024: -UA: Negative nitrite, negative leuk, negative blood -Urine culture: Normal  susie  No dysuria or gross hematuria.

## 2024-08-31 NOTE — PHYSICAL EXAM
[Normal Appearance] : normal appearance [Well Groomed] : well groomed [General Appearance - In No Acute Distress] : no acute distress [] : no respiratory distress [Respiration, Rhythm And Depth] : normal respiratory rhythm and effort [Exaggerated Use Of Accessory Muscles For Inspiration] : no accessory muscle use [Abdomen Soft] : soft [Normal Station and Gait] : the gait and station were normal for the patient's age [No Focal Deficits] : no focal deficits [Oriented To Time, Place, And Person] : oriented to person, place, and time [Affect] : the affect was normal [Mood] : the mood was normal

## 2024-08-31 NOTE — HISTORY OF PRESENT ILLNESS
[FreeTextEntry1] : 38 yo female with PMH of DM1, fatty liver disease was initially referred by her GYN for hematuria.  To review from her initial visit on 08/05/2024: Patient presented to the ER on 07/29/2024 with complaints of lower back pain radiating to her abdomen. With dysuria she was discharged home with an antibiotic prescription. Documents, labs, and imaging were reviewed by me and discussed with the patient.  Labs 07/29/2024: -Creatinine 0.7 -UA: >1000 glucose, negative nitrite, negative leuk, negative blood  CT 07/29/2024: -KIDNEYS/URETERS: Enhance symmetrically without hydroureteronephrosis. -BLADDER: Within normal limits.  Per patient, pain started 3 weeks ago. She does not take any medication for the pain. Patient being treated for potential UTI with Macrobid for the past 7 days, Patient also being treated for vaginal yeast infection by OBGYN.   Patient is currently experiencing no urinary urgency, no urinary frequency, no dysuria and no gross hematuria. Pain is located in the suprapubic area, left flank and right flank. The patient describes the pain as dull. Onset was gradual 3 week(s) ago. The symptoms occur constantly. She describes this as worsening. No relieving factors are noted.   Today's clinic visit on 08/26/2024:  Reviewed labs and discussed with patient.  Labs 08/19/2024: -UA: Negative nitrite, negative leuk, negative blood -Urine culture: Normal  susie  No dysuria or gross hematuria.

## 2024-08-31 NOTE — ASSESSMENT
[FreeTextEntry1] : 40 yo female with PMH of DM1, fatty liver disease was referred by her GYN for hematuria.  Reviewed results with patient as noted above. Workup has been negative.  Follow-up as needed.

## 2024-09-03 ENCOUNTER — APPOINTMENT (OUTPATIENT)
Dept: ENDOCRINOLOGY | Facility: CLINIC | Age: 39
End: 2024-09-03
Payer: MEDICAID

## 2024-09-03 VITALS
OXYGEN SATURATION: 98 % | HEIGHT: 64 IN | WEIGHT: 199 LBS | BODY MASS INDEX: 33.97 KG/M2 | SYSTOLIC BLOOD PRESSURE: 120 MMHG | HEART RATE: 78 BPM | DIASTOLIC BLOOD PRESSURE: 70 MMHG

## 2024-09-03 DIAGNOSIS — E11.9 TYPE 2 DIABETES MELLITUS W/OUT COMPLICATIONS: ICD-10-CM

## 2024-09-03 DIAGNOSIS — E66.9 OBESITY, UNSPECIFIED: ICD-10-CM

## 2024-09-03 DIAGNOSIS — E10.9 TYPE 1 DIABETES MELLITUS W/OUT COMPLICATIONS: ICD-10-CM

## 2024-09-03 DIAGNOSIS — R31.29 OTHER MICROSCOPIC HEMATURIA: ICD-10-CM

## 2024-09-03 DIAGNOSIS — Z96.41 PRESENCE OF INSULIN PUMP (EXTERNAL) (INTERNAL): ICD-10-CM

## 2024-09-03 PROCEDURE — 99214 OFFICE O/P EST MOD 30 MIN: CPT

## 2024-09-03 NOTE — HISTORY OF PRESENT ILLNESS
[Finger Stick] : Finger Stick: Yes [FreeTextEntry1] : Ms. CRISTI MONTGOMERY  Is  a 39 year old female who presented for follow up evaluation of type 1 Diabetes:    Diagnosis at the age of 1 year old  Current Regimen: uses Admelog now in the pump (medtronic  670 G )  Previous regimens: used to use Humalog  Compliance: good  SMBG/CGM : check around 8-10 times /day log reviewed , hyperglycemia noted, no hypoglycemia  Hypoglycemia: once Polyuria/polydipsia :no  Weight change/BMI: stable  Diet: breakfast , lunch dinner and bedtime snack ( cookie ) , problem with food at the facility she lives in unable to afford low sugar . HBa1c trend: high in setting of anemia     Prevention   Statin: no  ACE/ARB :no  Eye examination:  + near cataract no retinopathy , went recently  Neuropathy: +, followed by podiatry     patient and mother reports non compliance with diet and snacking a lot during the day and no bolus taken for the snacks , improved since last visit , but still happening    [Continuous Glucose Monitoring] : Continuous Glucose Monitoring: No [Hypoglycemia] : Patient is not hypoglycemic.

## 2024-09-03 NOTE — DATA REVIEWED
[FreeTextEntry1] : previous data and record reviewed  1/15/21:  hba1c 8.7% glucose 203  LDL 116crea 0.7  AST 15  ALT 18  hb 13.6  6/1/21: Hba1c 8.9% glucose 283  crea0.62   10/4/21: HBA1c 10.8% glucose 190   TSH 1.310  ft4 1.22 ALT 56   9/30/22: HBA1c 9.9%   hb 8.3 crea 0.63    alk phos 138  alb/crea  39  25 vit D 25.2 TSH 1.02  1/2023:A1c 9.8%  hb 9.6  glucose 355 crea 0.63   AST 29  ALT 52     ACR 24  TSH 0.791 fructosamine 440 b12 715   6/2023: A1c 10.7 % glucose 276 crea 0.7 gfr 113 AST 27 ALT 61  3/2024: glucose 576 crea 0.77   AST 52   alk [id number]   TSh 2.27  8/2024: A1c 9.6% ft4 1.2  TSH 1.82    Tg 140 glucose 282  crea 0.6    ACR negative

## 2024-09-03 NOTE — PHYSICAL THERAPY INITIAL EVALUATION ADULT - SITTING BALANCE: STATIC
Refill request from [facility]'s pharmacy for sertraline. This was refill 8/28/2024, prescription denied.  
good balance

## 2024-09-03 NOTE — ASSESSMENT
[Diabetes Foot Care] : diabetes foot care [Long Term Vascular Complications] : long term vascular complications of diabetes [Carbohydrate Consistent Diet] : carbohydrate consistent diet [Importance of Diet and Exercise] : importance of diet and exercise to improve glycemic control, achieve weight loss and improve cardiovascular health [Exercise/Effect on Glucose] : exercise/effect on glucose [Hypoglycemia Management] : hypoglycemia management [Action and use of Insulin] : action and use of short and long-acting insulin [Self Monitoring of Blood Glucose] : self monitoring of blood glucose [Insulin Self-Administration] : insulin self-administration [Injection Technique, Storage, Sharps Disposal] : injection technique, storage, and sharps disposal [Weight Loss] : weight loss [FreeTextEntry1] : 39 year old patient with type 1 DM on insulin pump presents for follow up, also has evidence of insulin resistance and now with JOEL    #poorly controlled type 1 DM on insulin pump Medtronic 630 G - pattern of hyperglycemia pre and post meals , attributed to noncompliance with diet , per mother and Ania , she was not watching her carbs and eating snack also not always taking boluses with snacks ,has been in and out of hospital with changes to her psych meds  changed rates and I: C  as below  - monitor lipids , lft  - following with podiatry/ psychiatry   Diabetes Therapy Regimen   Date: 9/3/24 Basal Rate Start Time: 12 am - Basal: 2.75 units/hour  Start Time: 12pm----4 pm  Basal: 3.5u/hr  STart TIme 4 pm- 12am BAsal 3.75 u/hr   Carb Ratios Start Time: 12am Carb Ratios:  8 grams/unit Start Time: 7 am Carb Ratios: 6 grams/unit.. Start Time: 2pm Carb Ratios: 6 grams/unit      ....1:5 g  Sensitvity Start Time: Sensitivity:  30 mg/dL/U   BG Target Ranges Start Time: BG Target Ranges:  120 mg/dL

## 2024-09-09 ENCOUNTER — APPOINTMENT (OUTPATIENT)
Dept: GASTROENTEROLOGY | Facility: CLINIC | Age: 39
End: 2024-09-09
Payer: MEDICAID

## 2024-09-09 VITALS
BODY MASS INDEX: 33.8 KG/M2 | SYSTOLIC BLOOD PRESSURE: 134 MMHG | HEART RATE: 81 BPM | HEIGHT: 64 IN | DIASTOLIC BLOOD PRESSURE: 80 MMHG | WEIGHT: 198 LBS

## 2024-09-09 DIAGNOSIS — D64.9 ANEMIA, UNSPECIFIED: ICD-10-CM

## 2024-09-09 DIAGNOSIS — D50.9 IRON DEFICIENCY ANEMIA, UNSPECIFIED: ICD-10-CM

## 2024-09-09 DIAGNOSIS — R19.7 DIARRHEA, UNSPECIFIED: ICD-10-CM

## 2024-09-09 DIAGNOSIS — K21.9 GASTRO-ESOPHAGEAL REFLUX DISEASE W/OUT ESOPHAGITIS: ICD-10-CM

## 2024-09-09 DIAGNOSIS — R10.9 UNSPECIFIED ABDOMINAL PAIN: ICD-10-CM

## 2024-09-09 DIAGNOSIS — R79.89 OTHER SPECIFIED ABNORMAL FINDINGS OF BLOOD CHEMISTRY: ICD-10-CM

## 2024-09-09 PROCEDURE — 99204 OFFICE O/P NEW MOD 45 MIN: CPT

## 2024-09-09 PROCEDURE — 99214 OFFICE O/P EST MOD 30 MIN: CPT

## 2024-09-09 RX ORDER — OMEPRAZOLE 40 MG/1
40 CAPSULE, DELAYED RELEASE ORAL
Refills: 0 | Status: ACTIVE | COMMUNITY

## 2024-09-10 ENCOUNTER — APPOINTMENT (OUTPATIENT)
Dept: PULMONOLOGY | Facility: CLINIC | Age: 39
End: 2024-09-10
Payer: MEDICAID

## 2024-09-10 VITALS
WEIGHT: 196 LBS | HEIGHT: 64 IN | BODY MASS INDEX: 33.46 KG/M2 | DIASTOLIC BLOOD PRESSURE: 62 MMHG | RESPIRATION RATE: 14 BRPM | OXYGEN SATURATION: 96 % | HEART RATE: 85 BPM | SYSTOLIC BLOOD PRESSURE: 112 MMHG

## 2024-09-10 DIAGNOSIS — G47.33 OBSTRUCTIVE SLEEP APNEA (ADULT) (PEDIATRIC): ICD-10-CM

## 2024-09-10 PROCEDURE — 99213 OFFICE O/P EST LOW 20 MIN: CPT

## 2024-09-10 NOTE — REASON FOR VISIT
[Home] : at home, [unfilled] , at the time of the visit. [Medical Office: ([facility])___] : at the medical office located in  [Verbal consent obtained from patient] : the patient, [unfilled] [FreeTextEntry4] : Rivka U [Follow-up] : a follow-up of an existing diagnosis [Initial Evaluation] : an initial evaluation [FreeTextEntry1] : RPA FORMER DR CROWE PT

## 2024-09-10 NOTE — HISTORY OF PRESENT ILLNESS
[FreeTextEntry1] : 38yo female h/o T1DM, depression, GERD, iron deficiency anemia presents for follow up previously seen by Dr. Mejia.  Pt last seen in Feb 2022 by Dr. Mejia, reporting chronic intermittent abdominal pain and heartburn, pt was advised EGD/colonoscopy pending optimization in glycemic control. No interval follow up. She has been following closely with endocrinology for diabetes mgmt and insulin adjustments. She is noncompliant with diet and reports anxiety/depression has been following with psych for medication adjustments. Pt has had recurrent ED evaluations for abdominal pain with CT imaging showing hepatic steatosis and cyst, otherwise no acute findings. She continues to report intermittent abdominal pain and heartburn, takes omeprazole daily, sometimes has symptoms later in the day, previously took pepcid which helped. Denies dysphagia. Occasional nausea, denies vomiting.  Reports alternating loose stools and constipation with occasional rectal bleeding mostly when strains. Appetite good, denies weight loss. She previously received iron supplementation. She was adopted so her family history is unknown.   Labs reviewed 8/2024 Hb 11.9, mcv 80.8 ast/alt 26/55, alk phos 167, t bili normal TSH 1.82 HBA1C 9.6% [de-identified] : 2/4/2023

## 2024-09-10 NOTE — PHYSICAL EXAM
[Alert] : alert [Normal Voice/Communication] : normal voice/communication [No Acute Distress] : no acute distress [Obese (BMI >= 30)] : obese (BMI >= 30) [Sclera] : the sclera and conjunctiva were normal [None] : no edema [Normal] : normal bowel sounds, non-tender, no masses, soft, no no hepato-splenomegaly [Normal Color / Pigmentation] : normal skin color and pigmentation [Oriented To Time, Place, And Person] : oriented to person, place, and time [de-identified] : Walker

## 2024-09-10 NOTE — HISTORY OF PRESENT ILLNESS
[TextBox_4] : CELINE likely  Daytime sleepiness  Obesity Snores loudly  Witnessed apneas In lab sleep study ordered

## 2024-09-10 NOTE — REASON FOR VISIT
[Home] : at home, [unfilled] , at the time of the visit. [Medical Office: ([facility])___] : at the medical office located in  [Verbal consent obtained from patient] : the patient, [unfilled] [FreeTextEntry4] : Rivka U [Follow-up] : a follow-up of an existing diagnosis [Initial Evaluation] : an initial evaluation [FreeTextEntry1] : RPA FORMER DR CROWE PT

## 2024-09-10 NOTE — PHYSICAL EXAM
[Alert] : alert [Normal Voice/Communication] : normal voice/communication [No Acute Distress] : no acute distress [Obese (BMI >= 30)] : obese (BMI >= 30) [Sclera] : the sclera and conjunctiva were normal [None] : no edema [Normal] : normal bowel sounds, non-tender, no masses, soft, no no hepato-splenomegaly [Normal Color / Pigmentation] : normal skin color and pigmentation [Oriented To Time, Place, And Person] : oriented to person, place, and time [de-identified] : Walker

## 2024-09-10 NOTE — ASSESSMENT
[FreeTextEntry1] : 40yo female h/o poorly controlled T1DM, depression, GERD, iron deficiency anemia presents for follow up previously seen by Dr. Mejia.   #Abdominal pain #GERD #Altered bowel pattern #Iron deficiency anemia Likely multifactorial, symptoms may be exacerbated in setting of poorly controlled diabetes -Recommend EGD and colonoscopy pending further optimization in glycemic control, discussed importance of dietary adherence and glucose control, pt/pts mother agreeable to further optimize diabetes mgmt prior to scheduling, discussed risks/benefits in detail -Check stool studies -Update labs including iron studies/ferritin, b12, folate -Continue omeprazole 40mg daily taken 20-30 minutes before breakfast -Resume pepcid 40mg qhs/prn -Antireflux measures discussed including elevating hob and to avoid lying down for 2-3 hours after meals  -Discussed avoidance of dietary triggers including spicy foods, coffee, etoh, citrus, tomatoes, chocolate, mints -Continue NSAID avoidance  #Hepatic cystic lesion #Hepatic Steatosis -Recommend MR Abd to further evaluate, prior study limited, pt states she wants to think further, may need premedication and wants to discuss with her psychiatrist, she will follow up with Dr. Munoz as scheduled  -Discussed strict glycemic control -Diet and lifestyle modifications -Encouraged to increase physical activity as tolerated  Follow up 1-2 months Pt agreeable with plan, advised to contact us if questions/concerns

## 2024-09-10 NOTE — HISTORY OF PRESENT ILLNESS
[FreeTextEntry1] : 40yo female h/o T1DM, depression, GERD, iron deficiency anemia presents for follow up previously seen by Dr. Mejia.  Pt last seen in Feb 2022 by Dr. Mejia, reporting chronic intermittent abdominal pain and heartburn, pt was advised EGD/colonoscopy pending optimization in glycemic control. No interval follow up. She has been following closely with endocrinology for diabetes mgmt and insulin adjustments. She is noncompliant with diet and reports anxiety/depression has been following with psych for medication adjustments. Pt has had recurrent ED evaluations for abdominal pain with CT imaging showing hepatic steatosis and cyst, otherwise no acute findings. She continues to report intermittent abdominal pain and heartburn, takes omeprazole daily, sometimes has symptoms later in the day, previously took pepcid which helped. Denies dysphagia. Occasional nausea, denies vomiting.  Reports alternating loose stools and constipation with occasional rectal bleeding mostly when strains. Appetite good, denies weight loss. She previously received iron supplementation. She was adopted so her family history is unknown.   Labs reviewed 8/2024 Hb 11.9, mcv 80.8 ast/alt 26/55, alk phos 167, t bili normal TSH 1.82 HBA1C 9.6% [de-identified] : 2/4/2023

## 2024-09-10 NOTE — ASSESSMENT
[FreeTextEntry1] : LLL nodule  Resolved   CELINE  Daytime sleepiness High STOP BANG  Could not do home study  In lab study ordered today   2 months follow up

## 2024-09-10 NOTE — ASSESSMENT
[FreeTextEntry1] : 38yo female h/o poorly controlled T1DM, depression, GERD, iron deficiency anemia presents for follow up previously seen by Dr. Mejia.   #Abdominal pain #GERD #Altered bowel pattern #Iron deficiency anemia Likely multifactorial, symptoms may be exacerbated in setting of poorly controlled diabetes -Recommend EGD and colonoscopy pending further optimization in glycemic control, discussed importance of dietary adherence and glucose control, pt/pts mother agreeable to further optimize diabetes mgmt prior to scheduling, discussed risks/benefits in detail -Check stool studies -Update labs including iron studies/ferritin, b12, folate -Continue omeprazole 40mg daily taken 20-30 minutes before breakfast -Resume pepcid 40mg qhs/prn -Antireflux measures discussed including elevating hob and to avoid lying down for 2-3 hours after meals  -Discussed avoidance of dietary triggers including spicy foods, coffee, etoh, citrus, tomatoes, chocolate, mints -Continue NSAID avoidance  #Hepatic cystic lesion #Hepatic Steatosis -Recommend MR Abd to further evaluate, prior study limited, pt states she wants to think further, may need premedication and wants to discuss with her psychiatrist, she will follow up with Dr. Munoz as scheduled  -Discussed strict glycemic control -Diet and lifestyle modifications -Encouraged to increase physical activity as tolerated  Follow up 1-2 months Pt agreeable with plan, advised to contact us if questions/concerns

## 2024-09-10 NOTE — PHYSICAL EXAM
[Alert] : alert [Normal Voice/Communication] : normal voice/communication [No Acute Distress] : no acute distress [Obese (BMI >= 30)] : obese (BMI >= 30) [Sclera] : the sclera and conjunctiva were normal [None] : no edema [Normal] : normal bowel sounds, non-tender, no masses, soft, no no hepato-splenomegaly [Normal Color / Pigmentation] : normal skin color and pigmentation [Oriented To Time, Place, And Person] : oriented to person, place, and time [de-identified] : Walker

## 2024-09-10 NOTE — PHYSICAL EXAM
[Alert] : alert [Normal Voice/Communication] : normal voice/communication [No Acute Distress] : no acute distress [Obese (BMI >= 30)] : obese (BMI >= 30) [Sclera] : the sclera and conjunctiva were normal [None] : no edema [Normal] : normal bowel sounds, non-tender, no masses, soft, no no hepato-splenomegaly [Normal Color / Pigmentation] : normal skin color and pigmentation [Oriented To Time, Place, And Person] : oriented to person, place, and time [de-identified] : Walker

## 2024-09-10 NOTE — HISTORY OF PRESENT ILLNESS
[FreeTextEntry1] : 40yo female h/o T1DM, depression, GERD, iron deficiency anemia presents for follow up previously seen by Dr. Mejia.  Pt last seen in Feb 2022 by Dr. Mejia, reporting chronic intermittent abdominal pain and heartburn, pt was advised EGD/colonoscopy pending optimization in glycemic control. No interval follow up. She has been following closely with endocrinology for diabetes mgmt and insulin adjustments. She is noncompliant with diet and reports anxiety/depression has been following with psych for medication adjustments. Pt has had recurrent ED evaluations for abdominal pain with CT imaging showing hepatic steatosis and cyst, otherwise no acute findings. She continues to report intermittent abdominal pain and heartburn, takes omeprazole daily, sometimes has symptoms later in the day, previously took pepcid which helped. Denies dysphagia. Occasional nausea, denies vomiting.  Reports alternating loose stools and constipation with occasional rectal bleeding mostly when strains. Appetite good, denies weight loss. She previously received iron supplementation. She was adopted so her family history is unknown.   Labs reviewed 8/2024 Hb 11.9, mcv 80.8 ast/alt 26/55, alk phos 167, t bili normal TSH 1.82 HBA1C 9.6% [de-identified] : 2/4/2023

## 2024-09-10 NOTE — REVIEW OF SYSTEMS
[As Noted in HPI] : as noted in HPI [Negative] : Neurological [Heartburn] : heartburn [Vomiting] : no vomiting [Bleeding] : no bleeding

## 2024-09-10 NOTE — PHYSICAL EXAM
[Alert] : alert [Normal Voice/Communication] : normal voice/communication [No Acute Distress] : no acute distress [Obese (BMI >= 30)] : obese (BMI >= 30) [Sclera] : the sclera and conjunctiva were normal [None] : no edema [Normal] : normal bowel sounds, non-tender, no masses, soft, no no hepato-splenomegaly [Normal Color / Pigmentation] : normal skin color and pigmentation [Oriented To Time, Place, And Person] : oriented to person, place, and time [de-identified] : Walker

## 2024-09-10 NOTE — PHYSICAL EXAM
[Alert] : alert [Normal Voice/Communication] : normal voice/communication [No Acute Distress] : no acute distress [Obese (BMI >= 30)] : obese (BMI >= 30) [Sclera] : the sclera and conjunctiva were normal [None] : no edema [Normal] : normal bowel sounds, non-tender, no masses, soft, no no hepato-splenomegaly [Normal Color / Pigmentation] : normal skin color and pigmentation [Oriented To Time, Place, And Person] : oriented to person, place, and time [de-identified] : Walker

## 2024-09-10 NOTE — REASON FOR VISIT
[Home] : at home, [unfilled] , at the time of the visit. [Medical Office: ([facility])___] : at the medical office located in  [Verbal consent obtained from patient] : the patient, [unfilled] [FreeTextEntry4] : Rivka U [Follow-up] : a follow-up of an existing diagnosis [Initial Evaluation] : an initial evaluation [FreeTextEntry1] : RPA FORMER DR CROWE PT

## 2024-09-10 NOTE — HISTORY OF PRESENT ILLNESS
[FreeTextEntry1] : 38yo female h/o T1DM, depression, GERD, iron deficiency anemia presents for follow up previously seen by Dr. Mejia.  Pt last seen in Feb 2022 by Dr. Mejia, reporting chronic intermittent abdominal pain and heartburn, pt was advised EGD/colonoscopy pending optimization in glycemic control. No interval follow up. She has been following closely with endocrinology for diabetes mgmt and insulin adjustments. She is noncompliant with diet and reports anxiety/depression has been following with psych for medication adjustments. Pt has had recurrent ED evaluations for abdominal pain with CT imaging showing hepatic steatosis and cyst, otherwise no acute findings. She continues to report intermittent abdominal pain and heartburn, takes omeprazole daily, sometimes has symptoms later in the day, previously took pepcid which helped. Denies dysphagia. Occasional nausea, denies vomiting.  Reports alternating loose stools and constipation with occasional rectal bleeding mostly when strains. Appetite good, denies weight loss. She previously received iron supplementation. She was adopted so her family history is unknown.   Labs reviewed 8/2024 Hb 11.9, mcv 80.8 ast/alt 26/55, alk phos 167, t bili normal TSH 1.82 HBA1C 9.6% [de-identified] : 2/4/2023

## 2024-09-10 NOTE — HISTORY OF PRESENT ILLNESS
[FreeTextEntry1] : 38yo female h/o T1DM, depression, GERD, iron deficiency anemia presents for follow up previously seen by Dr. Mejia.  Pt last seen in Feb 2022 by Dr. Mejia, reporting chronic intermittent abdominal pain and heartburn, pt was advised EGD/colonoscopy pending optimization in glycemic control. No interval follow up. She has been following closely with endocrinology for diabetes mgmt and insulin adjustments. She is noncompliant with diet and reports anxiety/depression has been following with psych for medication adjustments. Pt has had recurrent ED evaluations for abdominal pain with CT imaging showing hepatic steatosis and cyst, otherwise no acute findings. She continues to report intermittent abdominal pain and heartburn, takes omeprazole daily, sometimes has symptoms later in the day, previously took pepcid which helped. Denies dysphagia. Occasional nausea, denies vomiting.  Reports alternating loose stools and constipation with occasional rectal bleeding mostly when strains. Appetite good, denies weight loss. She previously received iron supplementation. She was adopted so her family history is unknown.   Labs reviewed 8/2024 Hb 11.9, mcv 80.8 ast/alt 26/55, alk phos 167, t bili normal TSH 1.82 HBA1C 9.6% [de-identified] : 2/4/2023

## 2024-09-10 NOTE — REASON FOR VISIT
[Home] : at home, [unfilled] , at the time of the visit. [Medical Office: ([facility])___] : at the medical office located in  [Verbal consent obtained from patient] : the patient, [unfilled] [FreeTextEntry4] : Rivka U [Follow-up] : a follow-up of an existing diagnosis [Initial Evaluation] : an initial evaluation [FreeTextEntry1] : RPA FORMER DR CROWE PT

## 2024-09-10 NOTE — HISTORY OF PRESENT ILLNESS
[FreeTextEntry1] : 38yo female h/o T1DM, depression, GERD, iron deficiency anemia presents for follow up previously seen by Dr. Mejia.  Pt last seen in Feb 2022 by Dr. Mejia, reporting chronic intermittent abdominal pain and heartburn, pt was advised EGD/colonoscopy pending optimization in glycemic control. No interval follow up. She has been following closely with endocrinology for diabetes mgmt and insulin adjustments. She is noncompliant with diet and reports anxiety/depression has been following with psych for medication adjustments. Pt has had recurrent ED evaluations for abdominal pain with CT imaging showing hepatic steatosis and cyst, otherwise no acute findings. She continues to report intermittent abdominal pain and heartburn, takes omeprazole daily, sometimes has symptoms later in the day, previously took pepcid which helped. Denies dysphagia. Occasional nausea, denies vomiting.  Reports alternating loose stools and constipation with occasional rectal bleeding mostly when strains. Appetite good, denies weight loss. She previously received iron supplementation. She was adopted so her family history is unknown.   Labs reviewed 8/2024 Hb 11.9, mcv 80.8 ast/alt 26/55, alk phos 167, t bili normal TSH 1.82 HBA1C 9.6% [de-identified] : 2/4/2023

## 2024-09-10 NOTE — REASON FOR VISIT
[Home] : at home, [unfilled] , at the time of the visit. [Medical Office: ([facility])___] : at the medical office located in  [Verbal consent obtained from patient] : the patient, [unfilled] [FreeTextEntry4] : Rivka U [Follow-up] : a follow-up of an existing diagnosis [Initial Evaluation] : an initial evaluation [FreeTextEntry1] : RPA FORMER DR CROWE PT

## 2024-09-10 NOTE — PHYSICAL EXAM
[No Acute Distress] : no acute distress [III] : Mallampati Class: III [Normal Appearance] : normal appearance [No Neck Mass] : no neck mass [Normal Rate/Rhythm] : normal rate/rhythm [Normal S1, S2] : normal s1, s2 [No Murmurs] : no murmurs [No Resp Distress] : no resp distress [Clear to Auscultation Bilaterally] : clear to auscultation bilaterally [No Abnormalities] : no abnormalities [Benign] : benign [Normal Gait] : normal gait [No Clubbing] : no clubbing [No Cyanosis] : no cyanosis [No Edema] : no edema [FROM] : FROM [Normal Color/ Pigmentation] : normal color/ pigmentation [No Focal Deficits] : no focal deficits [Oriented x3] : oriented x3 [Normal Affect] : normal affect [TextBox_2] : Please check summary

## 2024-09-10 NOTE — REASON FOR VISIT
[Home] : at home, [unfilled] , at the time of the visit. [Medical Office: ([facility])___] : at the medical office located in  [Verbal consent obtained from patient] : the patient, [unfilled] [FreeTextEntry4] : Rivka U [Follow-up] : a follow-up of an existing diagnosis [Initial Evaluation] : an initial evaluation [FreeTextEntry1] : RPA FORMER DR CROWE PT

## 2024-09-11 ENCOUNTER — NON-APPOINTMENT (OUTPATIENT)
Age: 39
End: 2024-09-11

## 2024-09-11 ENCOUNTER — APPOINTMENT (OUTPATIENT)
Dept: PSYCHIATRY | Facility: CLINIC | Age: 39
End: 2024-09-11

## 2024-09-13 ENCOUNTER — APPOINTMENT (OUTPATIENT)
Dept: PSYCHIATRY | Facility: CLINIC | Age: 39
End: 2024-09-13

## 2024-09-13 ENCOUNTER — OUTPATIENT (OUTPATIENT)
Dept: OUTPATIENT SERVICES | Facility: HOSPITAL | Age: 39
LOS: 1 days | End: 2024-09-13
Payer: MEDICAID

## 2024-09-13 DIAGNOSIS — F32.A DEPRESSION, UNSPECIFIED: ICD-10-CM

## 2024-09-13 DIAGNOSIS — F41.1 GENERALIZED ANXIETY DISORDER: ICD-10-CM

## 2024-09-13 DIAGNOSIS — F41.0 PANIC DISORDER [EPISODIC PAROXYSMAL ANXIETY]: ICD-10-CM

## 2024-09-13 PROCEDURE — 90832 PSYTX W PT 30 MINUTES: CPT

## 2024-09-13 NOTE — PHYSICAL EXAM
[Cooperative] : cooperative [Depressed] : depressed [Anxious] : anxious [Full] : full [Clear] : clear [Linear/Goal Directed] : linear/goal directed [None] : none [None Reported] : none reported [Average] : average [WNL] : within normal limits [FreeTextEntry8] :  "Still feel depressed" Patient able to use humor towards the end of today's session.  [de-identified] : Fair

## 2024-09-13 NOTE — PLAN
[FreeTextEntry2] : Goal #1" I want to feel better and manage my stress."   Objective #1 Patient will keep regular therapy sessions, identify triggers of mood disturbance or emotional dysregulation.   Objective #2 Patient will learn and utilize positive coping, bereavement, stress management, and CBT/DBT methods, including DBT, bereavement or IOP groups.     Goal #2 Manage medical issues. " I want to be healthy."  Obj#1   Patient will comply with all medical and psychiatric treatment recommendations.      [[facility]] : [facility]  [Supportive Therapy] : Supportive Therapy [de-identified] : Patient complained of feeling depressed and preoccupied with medication, feeling that her current medication is not working. She questions whether she is bipolar. Provided some education on bipolar disorder, which she reports not having these symptoms. She questions whether she should go to the hospital and would like to avoid going. She reports that when she is in the hospital, she does the same as home, which is to sleep or stay in bed, She reports that she did not utlize gorup in the hospital when she was there. She denies any suicide ideations. She reports being told by her psychiatrist, Dr. Curiel, that it takes a while for her antidepressant to work. She reports that it has been only two weeks that she is taking a higher dose or 30 mg of Cymbalta. Helped her identify triggers or sources of depression, including cognitive ones. She reports that, at times, she cries for no reason, usually before bedtime, and then goes to sleep. She reports that she started sleeping most of the day again. She reports that she relies on other people to vent to and provide reassurance, but venting to her male freind, Jesus, usually causes her to feel worse due to his reactions and comments. Encouraged her to learn self-soothing methods. Encouraged her to try an IOP group and to practice CBT methods daily, such as guided meditation, watching videos, and giving herself a "pep talk" or positive self-talk. She reports that thinking about and talking to her elderly male friend is helpful for her mood. She also reports that she is still engaging in luis art. She reports that she is considering going to [place] with [employer] to the [employer] with some of the other residents, but she is concerned that she will have a panic attack there if it is crowed. Spoke bout worst case scenario of calling [id number] to pick her up and leave the feast. She reports that she will be having an MRI of her liver, since she has a cyst on her liver. She reports that her Iron level is a little low but was not told by her doctor to take an iron supplement. She reports still waiting to start physical therapy.  [Recommended Frequency of Visits: ____] : Recommended frequency of visits: [unfilled] [Return in ____ week(s)] : Return in [unfilled] week(s) [FreeTextEntry1] : Patient will focus on positive coping and CBT methods. She will continue medication regimen, follow-up with psychiatrist on 9/16, and therapy on 9/27.

## 2024-09-13 NOTE — REASON FOR VISIT
[Patient] : Patient [FreeTextEntry1] : Patient is a 39-year-old female seen for a follow-up therapy session, 1:30 PM to 2:05 PM.

## 2024-09-14 DIAGNOSIS — F41.1 GENERALIZED ANXIETY DISORDER: ICD-10-CM

## 2024-09-14 DIAGNOSIS — F41.0 PANIC DISORDER [EPISODIC PAROXYSMAL ANXIETY]: ICD-10-CM

## 2024-09-14 DIAGNOSIS — F32.A DEPRESSION, UNSPECIFIED: ICD-10-CM

## 2024-09-16 ENCOUNTER — APPOINTMENT (OUTPATIENT)
Dept: PSYCHIATRY | Facility: CLINIC | Age: 39
End: 2024-09-16
Payer: MEDICAID

## 2024-09-16 ENCOUNTER — OUTPATIENT (OUTPATIENT)
Dept: OUTPATIENT SERVICES | Facility: HOSPITAL | Age: 39
LOS: 1 days | End: 2024-09-16
Payer: MEDICAID

## 2024-09-16 DIAGNOSIS — F41.1 GENERALIZED ANXIETY DISORDER: ICD-10-CM

## 2024-09-16 DIAGNOSIS — F32.A DEPRESSION, UNSPECIFIED: ICD-10-CM

## 2024-09-16 DIAGNOSIS — F41.0 GENERALIZED ANXIETY DISORDER: ICD-10-CM

## 2024-09-16 DIAGNOSIS — F41.9 ANXIETY DISORDER, UNSPECIFIED: ICD-10-CM

## 2024-09-16 DIAGNOSIS — F41.0 PANIC DISORDER [EPISODIC PAROXYSMAL ANXIETY]: ICD-10-CM

## 2024-09-16 PROCEDURE — 99214 OFFICE O/P EST MOD 30 MIN: CPT | Mod: 95

## 2024-09-16 RX ORDER — LORAZEPAM 1 MG/1
1 TABLET ORAL
Qty: 3 | Refills: 0 | Status: ACTIVE | COMMUNITY
Start: 2024-09-16 | End: 1900-01-01

## 2024-09-16 NOTE — HISTORY OF PRESENT ILLNESS
[Suicidal Behavior/Ideation] : suicidal behavior/ideation [FreeTextEntry1] : Pt seen in follow-up.  She had called a week ago to complain of feeling more depressed with the switch from Prozac to Cymbalta.  She concluded that the Cymbalta was worsening her depression, when it was far more likely that the Prozac was helping much more than she thought. This led to an extended discussion about what benefit pt might reasonably expect from an antidepressant.  Pt's losses in life, her living circumstances and her health issues all tend to trigger emotional reactions on a regular basis.  I tried to help her understand that sadness was not the same as the illness of depression, and that anxiety could be decreased but not eradicated.  She will need to look for areas of improvement over time and abandon the binary conclusions about a trial "working or not working."  She voiced understanding.  She says that she has felt a bit better since we spoke.  There have been fewer sad days and fewer panic attacks.  She denies any tolerability issues and would like to advance the Cymbalta dose.  Potential risks and benefits were reviewed.  Pt is to have another MRI of her back, and she had a panic attack with the past one.  I will send a few Ativan to take in those situations.   [FreeTextEntry2] : First treatment for depression and suicidality in late teens Approximately 8 IPP stays since then - the last in April 2024. Chronic suicidal thinking, with hx of past attempts.  Reportedly drank bleach/Pine-sol after father's death in 11/2022; prior to this patient has history of 3 remote attempts in her teens - drinking acetone, drinking hydrogen peroxide, and half bottle of ibuprofen. Pt has hx of ER visit for pseudo-seizures. [FreeTextEntry3] : Celexa, Cymbalta, Buspar, Zyprexa, Gabapentin (hives) - none were effective. Lexapro - ineffective Lamictal - fatigue

## 2024-09-16 NOTE — DISCUSSION/SUMMARY
[FreeTextEntry1] : Pt with chronic depression and anxiety.  Tolerating switch to Cymbalta and perhaps some mild improvement.  Will continue titration.

## 2024-09-16 NOTE — PLAN
[Medication education provided] : Medication education provided. [Rationale for medication choices, possible risks/precautions, benefits, alternative treatment choices, and consequences of non-treatment discussed] : Rationale for medication choices, possible risks/precautions, benefits, alternative treatment choices, and consequences of non-treatment discussed with patient/family/caregiver  [FreeTextEntry4] : Medication management to mitigate active depressive and anxiety sx. [FreeTextEntry5] : Increase Cymbalta to 60mg daily Ativan 1mg prn one hour prior to MRI Individual therapy RTC 1 month

## 2024-09-16 NOTE — PHYSICAL EXAM
[Average] : average [Cooperative] : cooperative [Full] : full [Clear] : clear [Linear/Goal Directed] : linear/goal directed [WNL] : within normal limits [FreeTextEntry8] : a little better [de-identified] : fair [de-identified] : fair

## 2024-09-16 NOTE — SOCIAL HISTORY
[FreeTextEntry1] : Father  in .  Pt lives with mother in assisted living ([facility]). Pt did not finish high school due to severe depression.

## 2024-09-17 DIAGNOSIS — F41.9 ANXIETY DISORDER, UNSPECIFIED: ICD-10-CM

## 2024-09-17 DIAGNOSIS — F41.0 PANIC DISORDER [EPISODIC PAROXYSMAL ANXIETY]: ICD-10-CM

## 2024-09-17 DIAGNOSIS — F32.A DEPRESSION, UNSPECIFIED: ICD-10-CM

## 2024-09-24 ENCOUNTER — NON-APPOINTMENT (OUTPATIENT)
Age: 39
End: 2024-09-24

## 2024-09-25 ENCOUNTER — NON-APPOINTMENT (OUTPATIENT)
Age: 39
End: 2024-09-25

## 2024-09-25 ENCOUNTER — EMERGENCY (EMERGENCY)
Facility: HOSPITAL | Age: 39
LOS: 0 days | Discharge: HOME | End: 2024-09-25
Attending: STUDENT IN AN ORGANIZED HEALTH CARE EDUCATION/TRAINING PROGRAM
Payer: MEDICAID

## 2024-09-25 VITALS
HEART RATE: 104 BPM | HEIGHT: 64 IN | DIASTOLIC BLOOD PRESSURE: 70 MMHG | SYSTOLIC BLOOD PRESSURE: 116 MMHG | RESPIRATION RATE: 22 BRPM | OXYGEN SATURATION: 100 % | TEMPERATURE: 99 F

## 2024-09-25 VITALS
DIASTOLIC BLOOD PRESSURE: 73 MMHG | SYSTOLIC BLOOD PRESSURE: 131 MMHG | TEMPERATURE: 99 F | HEART RATE: 112 BPM | OXYGEN SATURATION: 97 % | RESPIRATION RATE: 20 BRPM

## 2024-09-25 LAB
ALBUMIN SERPL ELPH-MCNC: 4.2 G/DL — SIGNIFICANT CHANGE UP (ref 3.5–5.2)
ALP SERPL-CCNC: 152 U/L — HIGH (ref 30–115)
ALT FLD-CCNC: 45 U/L — HIGH (ref 0–41)
ANION GAP SERPL CALC-SCNC: 15 MMOL/L — HIGH (ref 7–14)
AST SERPL-CCNC: 22 U/L — SIGNIFICANT CHANGE UP (ref 0–41)
B-OH-BUTYR SERPL-SCNC: <0.2 MMOL/L — SIGNIFICANT CHANGE UP
BASE EXCESS BLDV CALC-SCNC: -0.1 MMOL/L — SIGNIFICANT CHANGE UP (ref -2–3)
BASOPHILS # BLD AUTO: 0.02 K/UL — SIGNIFICANT CHANGE UP (ref 0–0.2)
BASOPHILS NFR BLD AUTO: 0.2 % — SIGNIFICANT CHANGE UP (ref 0–1)
BILIRUB SERPL-MCNC: <0.2 MG/DL — SIGNIFICANT CHANGE UP (ref 0.2–1.2)
BUN SERPL-MCNC: 9 MG/DL — LOW (ref 10–20)
CA-I SERPL-SCNC: 1.21 MMOL/L — SIGNIFICANT CHANGE UP (ref 1.15–1.33)
CALCIUM SERPL-MCNC: 9.3 MG/DL — SIGNIFICANT CHANGE UP (ref 8.4–10.4)
CHLORIDE SERPL-SCNC: 100 MMOL/L — SIGNIFICANT CHANGE UP (ref 98–110)
CO2 SERPL-SCNC: 24 MMOL/L — SIGNIFICANT CHANGE UP (ref 17–32)
CREAT SERPL-MCNC: 0.7 MG/DL — SIGNIFICANT CHANGE UP (ref 0.7–1.5)
EGFR: 113 ML/MIN/1.73M2 — SIGNIFICANT CHANGE UP
EOSINOPHIL # BLD AUTO: 0 K/UL — SIGNIFICANT CHANGE UP (ref 0–0.7)
EOSINOPHIL NFR BLD AUTO: 0 % — SIGNIFICANT CHANGE UP (ref 0–8)
GAS PNL BLDV: 132 MMOL/L — LOW (ref 136–145)
GAS PNL BLDV: SIGNIFICANT CHANGE UP
GAS PNL BLDV: SIGNIFICANT CHANGE UP
GLUCOSE SERPL-MCNC: 305 MG/DL — HIGH (ref 70–99)
HCO3 BLDV-SCNC: 26 MMOL/L — SIGNIFICANT CHANGE UP (ref 22–29)
HCT VFR BLD CALC: 38 % — SIGNIFICANT CHANGE UP (ref 37–47)
HCT VFR BLDA CALC: 36 % — SIGNIFICANT CHANGE UP (ref 34.5–46.5)
HGB BLD CALC-MCNC: 11.9 G/DL — SIGNIFICANT CHANGE UP (ref 11.7–16.1)
HGB BLD-MCNC: 11.7 G/DL — LOW (ref 12–16)
IMM GRANULOCYTES NFR BLD AUTO: 0.7 % — HIGH (ref 0.1–0.3)
LACTATE BLDV-MCNC: 3 MMOL/L — HIGH (ref 0.5–2)
LYMPHOCYTES # BLD AUTO: 0.88 K/UL — LOW (ref 1.2–3.4)
LYMPHOCYTES # BLD AUTO: 8.5 % — LOW (ref 20.5–51.1)
MCHC RBC-ENTMCNC: 25.1 PG — LOW (ref 27–31)
MCHC RBC-ENTMCNC: 30.8 G/DL — LOW (ref 32–37)
MCV RBC AUTO: 81.4 FL — SIGNIFICANT CHANGE UP (ref 81–99)
MONOCYTES # BLD AUTO: 0.14 K/UL — SIGNIFICANT CHANGE UP (ref 0.1–0.6)
MONOCYTES NFR BLD AUTO: 1.4 % — LOW (ref 1.7–9.3)
NEUTROPHILS # BLD AUTO: 9.24 K/UL — HIGH (ref 1.4–6.5)
NEUTROPHILS NFR BLD AUTO: 89.2 % — HIGH (ref 42.2–75.2)
NRBC # BLD: 0 /100 WBCS — SIGNIFICANT CHANGE UP (ref 0–0)
NT-PROBNP SERPL-SCNC: <36 PG/ML — SIGNIFICANT CHANGE UP (ref 0–300)
PCO2 BLDV: 47 MMHG — HIGH (ref 39–42)
PH BLDV: 7.35 — SIGNIFICANT CHANGE UP (ref 7.32–7.43)
PLATELET # BLD AUTO: 418 K/UL — HIGH (ref 130–400)
PMV BLD: 10.6 FL — HIGH (ref 7.4–10.4)
PO2 BLDV: 30 MMHG — SIGNIFICANT CHANGE UP (ref 25–45)
POTASSIUM BLDV-SCNC: 3.6 MMOL/L — SIGNIFICANT CHANGE UP (ref 3.5–5.1)
POTASSIUM SERPL-MCNC: 3.8 MMOL/L — SIGNIFICANT CHANGE UP (ref 3.5–5)
POTASSIUM SERPL-SCNC: 3.8 MMOL/L — SIGNIFICANT CHANGE UP (ref 3.5–5)
PROT SERPL-MCNC: 7.6 G/DL — SIGNIFICANT CHANGE UP (ref 6–8)
RBC # BLD: 4.67 M/UL — SIGNIFICANT CHANGE UP (ref 4.2–5.4)
RBC # FLD: 14.8 % — HIGH (ref 11.5–14.5)
SAO2 % BLDV: 44 % — LOW (ref 67–88)
SODIUM SERPL-SCNC: 139 MMOL/L — SIGNIFICANT CHANGE UP (ref 135–146)
WBC # BLD: 10.35 K/UL — SIGNIFICANT CHANGE UP (ref 4.8–10.8)
WBC # FLD AUTO: 10.35 K/UL — SIGNIFICANT CHANGE UP (ref 4.8–10.8)

## 2024-09-25 PROCEDURE — 83605 ASSAY OF LACTIC ACID: CPT

## 2024-09-25 PROCEDURE — 36415 COLL VENOUS BLD VENIPUNCTURE: CPT

## 2024-09-25 PROCEDURE — 84295 ASSAY OF SERUM SODIUM: CPT

## 2024-09-25 PROCEDURE — 84132 ASSAY OF SERUM POTASSIUM: CPT

## 2024-09-25 PROCEDURE — 82962 GLUCOSE BLOOD TEST: CPT

## 2024-09-25 PROCEDURE — 99285 EMERGENCY DEPT VISIT HI MDM: CPT

## 2024-09-25 PROCEDURE — 85018 HEMOGLOBIN: CPT

## 2024-09-25 PROCEDURE — 80053 COMPREHEN METABOLIC PANEL: CPT

## 2024-09-25 PROCEDURE — 94640 AIRWAY INHALATION TREATMENT: CPT

## 2024-09-25 PROCEDURE — 96374 THER/PROPH/DIAG INJ IV PUSH: CPT

## 2024-09-25 PROCEDURE — 99284 EMERGENCY DEPT VISIT MOD MDM: CPT | Mod: 25

## 2024-09-25 PROCEDURE — 85014 HEMATOCRIT: CPT

## 2024-09-25 PROCEDURE — 85025 COMPLETE CBC W/AUTO DIFF WBC: CPT

## 2024-09-25 PROCEDURE — 82330 ASSAY OF CALCIUM: CPT

## 2024-09-25 PROCEDURE — 82010 KETONE BODYS QUAN: CPT

## 2024-09-25 PROCEDURE — 71046 X-RAY EXAM CHEST 2 VIEWS: CPT

## 2024-09-25 PROCEDURE — 71046 X-RAY EXAM CHEST 2 VIEWS: CPT | Mod: 26

## 2024-09-25 PROCEDURE — 83880 ASSAY OF NATRIURETIC PEPTIDE: CPT

## 2024-09-25 PROCEDURE — 82803 BLOOD GASES ANY COMBINATION: CPT

## 2024-09-25 RX ORDER — IPRATROPIUM BROMIDE AND ALBUTEROL SULFATE .5; 3 MG/3ML; MG/3ML
3 SOLUTION RESPIRATORY (INHALATION)
Refills: 0 | Status: DISCONTINUED | OUTPATIENT
Start: 2024-09-25 | End: 2024-09-25

## 2024-09-25 RX ORDER — ACETAMINOPHEN 325 MG/1
975 TABLET ORAL ONCE
Refills: 0 | Status: COMPLETED | OUTPATIENT
Start: 2024-09-25 | End: 2024-09-25

## 2024-09-25 RX ADMIN — ACETAMINOPHEN 975 MILLIGRAM(S): 325 TABLET ORAL at 13:12

## 2024-09-25 RX ADMIN — Medication 1000 MILLILITER(S): at 14:40

## 2024-09-25 RX ADMIN — IPRATROPIUM BROMIDE AND ALBUTEROL SULFATE 3 MILLILITER(S): .5; 3 SOLUTION RESPIRATORY (INHALATION) at 13:13

## 2024-09-25 RX ADMIN — IPRATROPIUM BROMIDE AND ALBUTEROL SULFATE 3 MILLILITER(S): .5; 3 SOLUTION RESPIRATORY (INHALATION) at 13:16

## 2024-09-25 RX ADMIN — Medication 150 GRAM(S): at 14:40

## 2024-09-25 RX ADMIN — Medication 2 PUFF(S): at 17:48

## 2024-09-25 NOTE — ED PROVIDER NOTE - NSFOLLOWUPINSTRUCTIONS_ED_ALL_ED_FT
TAKE MEDICATIONS AS PRESCRIBED  RETURN TO ED FOR NEW OR WORSENING SYMPTOMS    Cough    Coughing is a reflex that clears your throat and your airways. Coughing helps to heal and protect your lungs. It is normal to cough occasionally, but a cough that happens with other symptoms or lasts a long time may be a sign of a condition that needs treatment. Coughing may be caused by infections, asthma or COPD, smoking, postnasal drip, gastroesophageal reflux, as well as other medical conditions. Take medicines only as instructed by your health care provider. Avoid environments or triggers that causes you to cough at work or at home.    SEEK IMMEDIATE MEDICAL CARE IF YOU HAVE ANY OF THE FOLLOWING SYMPTOMS: coughing up blood, shortness of breath, rapid heart rate, chest pain, unexplained weight loss or night sweats.

## 2024-09-25 NOTE — ED PROVIDER NOTE - PHYSICAL EXAMINATION
CONST: Well appearing in NAD  EYES: EOMI, Sclera and conjunctiva clear.   ENT: No nasal discharge  NECK: Non-tender, no meningeal signs  CARD: Normal S1 S2; Normal rate and rhythm  RESP: Equal BS B/L, No rhonchi or rales. No distress. b/l wheeze all lung fields  GI: Soft, non-tender, non-distended.  MS: Normal ROM in all extremities.   SKIN: Warm, dry, no acute rashes. Good turgor  NEURO: A&Ox3, No focal deficits. Strength 5/5 with no sensory deficits

## 2024-09-25 NOTE — ED PROVIDER NOTE - OBJECTIVE STATEMENT
patient is a 40yo female PMH DM, htn, depression, anxiety coming to ED for shortness of breath and cough. pt reports nasal congestion and productive cough with clear flem x3 days, has associated SOB. subjective chills at home. went to urgent care today and was diagnosed with bronchitis, given albuterol neb and decadron but pt still reporting SOB, so sent to ED. of note pt reports multiple people she lives with at facility have similar symptoms. denies chest pain, abdominal pain, n,v,d,c, recent travel, urinary symptoms

## 2024-09-25 NOTE — ED PROVIDER NOTE - PROGRESS NOTE DETAILS
pt reevaluated, reports feeling better, wheeze improved. pt given prednisone and albuterol neb at urgent care

## 2024-09-25 NOTE — ED PROVIDER NOTE - PATIENT PORTAL LINK FT
You can access the FollowMyHealth Patient Portal offered by [facility] by registering at the following website: [email or web address]. By joining [employer]’s FollowMyHealth portal, you will also be able to view your health information using other applications (apps) compatible with our system.

## 2024-09-25 NOTE — ED ADULT TRIAGE NOTE - CHIEF COMPLAINT QUOTE
BIBA from urgent care for SOB  was given 3 duonebs prior to arrival & 10mg decadron  h/o type 1 diabetes

## 2024-09-25 NOTE — ED PROVIDER NOTE - CLINICAL SUMMARY MEDICAL DECISION MAKING FREE TEXT BOX
40yo female PMH DM, htn, depression, anxiety coming to ED for shortness of breath and cough. pt reports nasal congestion and productive cough with clear flem x3 days. +sick contacts at the assisted living. on my interview, she denies sob or chest pain. her biggest concern is her cough. on exam, normal work of breathing however wheezing bilaterally. labs reviewed, hyperglycemia however no evidence of DKA. xray chest independently interpreted by me Dr. Bai showing no focal opacities. treated for wheezing with improvement. Patient is a good candidate to attempt outpatient management. Supportive care and home care discussed in detail. Patient aware they may have to return for re-evaluation and possible admission if outpatient treatment fails. Strict return precautions discussed.

## 2024-09-27 ENCOUNTER — NON-APPOINTMENT (OUTPATIENT)
Age: 39
End: 2024-09-27

## 2024-09-27 ENCOUNTER — APPOINTMENT (OUTPATIENT)
Dept: PSYCHIATRY | Facility: CLINIC | Age: 39
End: 2024-09-27

## 2024-10-01 NOTE — REASON FOR VISIT
Encounter Date: 10/1/2024       History     Chief Complaint   Patient presents with    Sinus Congestion     Pt c/o sinus congestion starting today     21-year-old male presents to the emergency department complaining cold symptoms x2 days.  Denies fever/chills/nausea/vomiting/chest pain/shortness breath.    The history is provided by the patient. No  was used.     Review of patient's allergies indicates:   Allergen Reactions    Influenza virus vaccines Swelling     Local reaction only     Past Medical History:   Diagnosis Date    Asthma      History reviewed. No pertinent surgical history.  No family history on file.  Social History     Tobacco Use    Smoking status: Never    Smokeless tobacco: Never   Substance Use Topics    Alcohol use: No    Drug use: Never     Review of Systems   Constitutional:  Negative for chills and fever.   HENT:  Positive for congestion and rhinorrhea.    Respiratory:  Positive for cough. Negative for shortness of breath.    Gastrointestinal:  Negative for nausea and vomiting.   All other systems reviewed and are negative.      Physical Exam     Initial Vitals [10/01/24 0216]   BP Pulse Resp Temp SpO2   (!) 154/92 83 20 97.8 °F (36.6 °C) 97 %      MAP       --         Physical Exam    Nursing note and vitals reviewed.  Constitutional: He appears well-developed and well-nourished. He is not diaphoretic. No distress.   HENT:   Head: Normocephalic and atraumatic.   Nose: Rhinorrhea present. Mouth/Throat: Posterior oropharyngeal erythema present. No oropharyngeal exudate or posterior oropharyngeal edema.   Eyes: EOM are normal. Pupils are equal, round, and reactive to light.   Neck: Neck supple.   Normal range of motion.  Cardiovascular:  Normal rate and regular rhythm.           Pulmonary/Chest: Breath sounds normal. No respiratory distress. He has no wheezes. He has no rales.   Abdominal: Abdomen is soft. Bowel sounds are normal.   Musculoskeletal:         General: Normal  range of motion.      Cervical back: Normal range of motion and neck supple.     Neurological: He is alert and oriented to person, place, and time.   Skin: Skin is warm and dry.   Psychiatric: He has a normal mood and affect.         ED Course   Procedures  Labs Reviewed - No data to display       Imaging Results    None          Medications - No data to display  Medical Decision Making  21-year-old male presents to the emergency department complaining cold symptoms x2 days.  Denies fever/chills/nausea/vomiting/chest pain/shortness breath.  Course of ED stay:   Instructions for viral URI were given as well as prescriptions for Astelin/fluticasone.  Patient was advised to follow-up with his primary care physician within the next week for re-evaluation/return to the emergency department if condition worsens.                                      Clinical Impression:  Final diagnoses:  [J06.9] Acute URI (Primary)          ED Disposition Condition    Discharge Stable          ED Prescriptions       Medication Sig Dispense Start Date End Date Auth. Provider    azelastine (ASTELIN) 137 mcg (0.1 %) nasal spray 2 sprays (274 mcg total) by Nasal route 2 (two) times daily. 30 mL 10/1/2024 11/25/2024 Siva Zacarias MD    fluticasone propionate (FLONASE) 50 mcg/actuation nasal spray 2 sprays (100 mcg total) by Each Nostril route once daily. 15 g 10/1/2024 -- Siva Zacarias MD          Follow-up Information       Follow up With Specialties Details Why Contact Info    Migdalia Sr MD Pediatrics Schedule an appointment as soon as possible for a visit in 1 week For reevaluation [address]  [place] [address]  [phone]               Siva Zacarias MD  10/01/24 0219     [Mother] : mother [Follow-Up Visit] : a follow-up visit [Patient] : Patient [FreeTextEntry1] : Patient is a 38-year-old female seen for a follow-up therapy session, 11:30 AM to 12:00 PM.

## 2024-10-03 ENCOUNTER — NON-APPOINTMENT (OUTPATIENT)
Age: 39
End: 2024-10-03

## 2024-10-03 NOTE — DISCUSSION/SUMMARY
[FreeTextEntry1] : Patient left message reporting that she is feeling better from having Bronchitis and ready to schedule her next appointment. Left message providing an appointment for 10/11 at 1:30 PM. She called and left message to confirm this appointment.

## 2024-10-03 NOTE — DIETITIAN INITIAL EVALUATION ADULT. - REASON INDICATOR FOR ASSESSMENT
Hydrochlorothiazide sent to [facility] ( rather than to [facility])   
RD screen - DKA

## 2024-10-04 ENCOUNTER — NON-APPOINTMENT (OUTPATIENT)
Age: 39
End: 2024-10-04

## 2024-10-08 NOTE — ED ADULT NURSE NOTE - NS ED NURSE LEVEL OF CONSCIOUSNESS ORIENTATION
"5/2024 - "Abnormal central VNG due to abnormal oculomotor function (sinusoidal tracking and saccades) and abnormal caloric fixation suppression for right-beating calorics (right cool, left warm calorics)"    Pt reports episodic vertiginous symptoms that are triggered by neck hyperextension   MRI brain w wo reviewed personally & is negative for central etiology of this as suggested on VNG  Will obtain dedicated vascular imaging for further evaluation -- ? some dynamic compressive component that may be causing vertebrobasilar compromise   "
Oriented - self; Oriented - place; Oriented - time

## 2024-10-11 ENCOUNTER — APPOINTMENT (OUTPATIENT)
Dept: PSYCHIATRY | Facility: CLINIC | Age: 39
End: 2024-10-11

## 2024-10-11 ENCOUNTER — NON-APPOINTMENT (OUTPATIENT)
Age: 39
End: 2024-10-11

## 2024-10-15 ENCOUNTER — OUTPATIENT (OUTPATIENT)
Dept: OUTPATIENT SERVICES | Facility: HOSPITAL | Age: 39
LOS: 1 days | End: 2024-10-15
Payer: MEDICAID

## 2024-10-15 ENCOUNTER — APPOINTMENT (OUTPATIENT)
Dept: PSYCHIATRY | Facility: CLINIC | Age: 39
End: 2024-10-15
Payer: MEDICAID

## 2024-10-15 DIAGNOSIS — F41.0 GENERALIZED ANXIETY DISORDER: ICD-10-CM

## 2024-10-15 DIAGNOSIS — F41.1 GENERALIZED ANXIETY DISORDER: ICD-10-CM

## 2024-10-15 DIAGNOSIS — F32.A DEPRESSION, UNSPECIFIED: ICD-10-CM

## 2024-10-15 PROCEDURE — 99214 OFFICE O/P EST MOD 30 MIN: CPT | Mod: 95

## 2024-10-15 RX ORDER — SERTRALINE 25 MG/1
25 TABLET, FILM COATED ORAL DAILY
Qty: 15 | Refills: 0 | Status: ACTIVE | COMMUNITY
Start: 2024-10-15 | End: 1900-01-01

## 2024-10-16 ENCOUNTER — APPOINTMENT (OUTPATIENT)
Dept: GASTROENTEROLOGY | Facility: CLINIC | Age: 39
End: 2024-10-16
Payer: MEDICAID

## 2024-10-16 VITALS
DIASTOLIC BLOOD PRESSURE: 83 MMHG | SYSTOLIC BLOOD PRESSURE: 129 MMHG | WEIGHT: 200 LBS | HEIGHT: 64 IN | OXYGEN SATURATION: 97 % | BODY MASS INDEX: 34.15 KG/M2 | HEART RATE: 83 BPM

## 2024-10-16 DIAGNOSIS — K76.0 FATTY (CHANGE OF) LIVER, NOT ELSEWHERE CLASSIFIED: ICD-10-CM

## 2024-10-16 DIAGNOSIS — K76.9 LIVER DISEASE, UNSPECIFIED: ICD-10-CM

## 2024-10-16 DIAGNOSIS — F32.A DEPRESSION, UNSPECIFIED: ICD-10-CM

## 2024-10-16 DIAGNOSIS — R74.8 ABNORMAL LEVELS OF OTHER SERUM ENZYMES: ICD-10-CM

## 2024-10-16 DIAGNOSIS — E66.811 OBESITY, CLASS 1: ICD-10-CM

## 2024-10-16 PROCEDURE — 91200 LIVER ELASTOGRAPHY: CPT

## 2024-10-16 PROCEDURE — 99214 OFFICE O/P EST MOD 30 MIN: CPT | Mod: 25

## 2024-10-16 NOTE — ED PROVIDER NOTE - CCCP TRG CHIEF CMPLNT
----- Message from ROXANNA Sutton sent at 10/16/2024  8:20 AM CDT -----  Results discussed with Dr. Rasheed.  Please call Ron and advise serologic studies for Herpes are indeterminate.  It was discussed with provider at time visit, that serologic testing is not ideal screening.  I recommend close monitoring and should Ron develop symptoms, he should return to clinic or close follow up with PCP for further evaluation and treatment.  Continue with condom use with all sexual activity, when ok to return to sexual activity.   chest pain

## 2024-10-18 ENCOUNTER — APPOINTMENT (OUTPATIENT)
Dept: NEUROLOGY | Facility: CLINIC | Age: 39
End: 2024-10-18
Payer: MEDICAID

## 2024-10-18 VITALS
SYSTOLIC BLOOD PRESSURE: 127 MMHG | DIASTOLIC BLOOD PRESSURE: 81 MMHG | BODY MASS INDEX: 34.15 KG/M2 | WEIGHT: 200 LBS | OXYGEN SATURATION: 98 % | HEIGHT: 64 IN | HEART RATE: 85 BPM

## 2024-10-18 DIAGNOSIS — M54.16 RADICULOPATHY, LUMBAR REGION: ICD-10-CM

## 2024-10-18 DIAGNOSIS — G56.03 CARPAL TUNNEL SYNDROM,BILATERAL UPPER LIMBS: ICD-10-CM

## 2024-10-18 DIAGNOSIS — R25.1 TREMOR, UNSPECIFIED: ICD-10-CM

## 2024-10-18 DIAGNOSIS — R51.9 HEADACHE, UNSPECIFIED: ICD-10-CM

## 2024-10-18 DIAGNOSIS — G62.9 POLYNEUROPATHY, UNSPECIFIED: ICD-10-CM

## 2024-10-18 PROCEDURE — 99215 OFFICE O/P EST HI 40 MIN: CPT

## 2024-10-18 PROCEDURE — G2211 COMPLEX E/M VISIT ADD ON: CPT | Mod: NC

## 2024-10-18 RX ORDER — FAMOTIDINE 40 MG/1
40 TABLET, FILM COATED ORAL
Refills: 0 | Status: ACTIVE | COMMUNITY

## 2024-10-18 RX ORDER — OMEPRAZOLE 40 MG/1
40 CAPSULE, DELAYED RELEASE ORAL
Refills: 0 | Status: ACTIVE | COMMUNITY

## 2024-10-20 ENCOUNTER — EMERGENCY (EMERGENCY)
Facility: HOSPITAL | Age: 39
LOS: 0 days | Discharge: HOME | End: 2024-10-20
Attending: STUDENT IN AN ORGANIZED HEALTH CARE EDUCATION/TRAINING PROGRAM
Payer: MEDICAID

## 2024-10-20 VITALS
DIASTOLIC BLOOD PRESSURE: 90 MMHG | HEART RATE: 98 BPM | SYSTOLIC BLOOD PRESSURE: 145 MMHG | RESPIRATION RATE: 18 BRPM | WEIGHT: 195.11 LBS | TEMPERATURE: 99 F | HEIGHT: 64 IN

## 2024-10-20 DIAGNOSIS — I10 ESSENTIAL (PRIMARY) HYPERTENSION: ICD-10-CM

## 2024-10-20 DIAGNOSIS — E11.40 TYPE 2 DIABETES MELLITUS WITH DIABETIC NEUROPATHY, UNSPECIFIED: ICD-10-CM

## 2024-10-20 DIAGNOSIS — Z88.8 ALLERGY STATUS TO OTHER DRUGS, MEDICAMENTS AND BIOLOGICAL SUBSTANCES: ICD-10-CM

## 2024-10-20 DIAGNOSIS — H60.92 UNSPECIFIED OTITIS EXTERNA, LEFT EAR: ICD-10-CM

## 2024-10-20 DIAGNOSIS — Z88.0 ALLERGY STATUS TO PENICILLIN: ICD-10-CM

## 2024-10-20 DIAGNOSIS — Z88.1 ALLERGY STATUS TO OTHER ANTIBIOTIC AGENTS: ICD-10-CM

## 2024-10-20 DIAGNOSIS — H92.02 OTALGIA, LEFT EAR: ICD-10-CM

## 2024-10-20 PROCEDURE — 99283 EMERGENCY DEPT VISIT LOW MDM: CPT

## 2024-10-20 RX ORDER — OFLOXACIN 0.2 MG/ML
2 SOLUTION AURICULAR (OTIC) ONCE
Refills: 0 | Status: COMPLETED | OUTPATIENT
Start: 2024-10-20 | End: 2024-10-20

## 2024-10-20 RX ADMIN — Medication 600 MILLIGRAM(S): at 11:24

## 2024-10-20 RX ADMIN — OFLOXACIN 2 DROP(S): 0.2 SOLUTION AURICULAR (OTIC) at 12:47

## 2024-10-20 NOTE — ED ADULT TRIAGE NOTE - CHIEF COMPLAINT QUOTE
[facility] [email or web address]        Made Melissa aware of MTM referral order placed on 4/28/21 and to expect a call sometime next week for an appointment to be scheduled.    Blanca Bright, RN  Care Coordinator  [facility] DORA [email or web address].[email or web address]. Nurse Line: [phone]  / Personal Line: [phone]  04/29/21 11:34 AM        BIBA from [facility] c.o L ear discharge, pain and low grade fever of 99.6 since yesterday. pt ambulates at baseline.

## 2024-10-20 NOTE — ED PROVIDER NOTE - CLINICAL SUMMARY MEDICAL DECISION MAKING FREE TEXT BOX
.    39-year-old female with left-sided ear pain discharge.  No hearing loss, trauma, fever, mastoid tenderness.  Exam as noted above.  Impression otitis externa.  Patient given otic drops, stable for discharge with continued otic medication use, supportive care and return precautions.  DC home    .

## 2024-10-20 NOTE — ED PROVIDER NOTE - OBJECTIVE STATEMENT
39-year-old female with past medical history of diabetes, hypertension, neuropathy presenting with left ear pain and discharge since yesterday.  No fevers or chills.  No hearing loss.  No trauma.  Has had similar symptoms in the past before.

## 2024-10-20 NOTE — ED PROVIDER NOTE - PATIENT PORTAL LINK FT
You can access the FollowMyHealth Patient Portal offered by [facility] by registering at the following website: [email or web address]. By joining [employer]’s FollowMyHealth portal, you will also be able to view your health information using other applications (apps) compatible with our system.

## 2024-10-20 NOTE — ED ADULT NURSE NOTE - CHIEF COMPLAINT QUOTE
BIBA from [facility] c.o L ear discharge, pain and low grade fever of 99.6 since yesterday. pt ambulates at baseline.

## 2024-10-20 NOTE — ED PROVIDER NOTE - PHYSICAL EXAMINATION
VITAL SIGNS: I have reviewed nursing notes and confirm.  CONSTITUTIONAL: Patient is in no acute distress.  SKIN: Skin exam is warm and dry, no acute rash.  HEAD: Normocephalic; atraumatic.  EYES: PERRL, EOM intact; conjunctiva and sclera clear.  ENT: No nasal discharge; airway clear.  +Pain with pulling of pinna of left ear. +Left outer ear canal with edema and discharge noted. Right TM normal.   NECK: Supple; non tender.  LYMPH: No acute cervical adenopathy.  NEURO: Alert, oriented. Grossly unremarkable. No focal deficits.  PSYCH: Cooperative, appropriate.

## 2024-10-25 ENCOUNTER — APPOINTMENT (OUTPATIENT)
Dept: OTOLARYNGOLOGY | Facility: CLINIC | Age: 39
End: 2024-10-25
Payer: MEDICAID

## 2024-10-25 ENCOUNTER — APPOINTMENT (OUTPATIENT)
Dept: PSYCHIATRY | Facility: CLINIC | Age: 39
End: 2024-10-25

## 2024-10-25 DIAGNOSIS — H92.02 OTALGIA, LEFT EAR: ICD-10-CM

## 2024-10-25 PROCEDURE — 99213 OFFICE O/P EST LOW 20 MIN: CPT

## 2024-10-25 RX ORDER — FLUOCINOLONE ACETONIDE 0.11 MG/ML
0.01 OIL AURICULAR (OTIC)
Qty: 1 | Refills: 3 | Status: ACTIVE | COMMUNITY
Start: 2024-10-25 | End: 1900-01-01

## 2024-10-28 ENCOUNTER — APPOINTMENT (OUTPATIENT)
Dept: PSYCHIATRY | Facility: CLINIC | Age: 39
End: 2024-10-28

## 2024-10-28 RX ORDER — ACETIC ACID 20 MG/ML
2 SOLUTION AURICULAR (OTIC)
Qty: 1 | Refills: 0 | Status: ACTIVE | COMMUNITY
Start: 2024-10-28 | End: 1900-01-01

## 2024-10-30 DIAGNOSIS — R06.2 WHEEZING: ICD-10-CM

## 2024-10-31 LAB — EAR NOSE AND THROAT CULTURE: ABNORMAL

## 2024-11-01 ENCOUNTER — NON-APPOINTMENT (OUTPATIENT)
Age: 39
End: 2024-11-01

## 2024-11-01 RX ORDER — INSULIN ASPART 100 [IU]/ML
100 INJECTION, SOLUTION INTRAVENOUS; SUBCUTANEOUS
Qty: 14 | Refills: 3 | Status: ACTIVE | COMMUNITY
Start: 2024-11-01 | End: 1900-01-01

## 2024-11-02 ENCOUNTER — RX RENEWAL (OUTPATIENT)
Age: 39
End: 2024-11-02

## 2024-11-04 ENCOUNTER — NON-APPOINTMENT (OUTPATIENT)
Age: 39
End: 2024-11-04

## 2024-11-04 ENCOUNTER — OUTPATIENT (OUTPATIENT)
Dept: OUTPATIENT SERVICES | Facility: HOSPITAL | Age: 39
LOS: 1 days | End: 2024-11-04
Payer: MEDICAID

## 2024-11-04 ENCOUNTER — APPOINTMENT (OUTPATIENT)
Dept: INTERNAL MEDICINE | Facility: CLINIC | Age: 39
End: 2024-11-04
Payer: MEDICAID

## 2024-11-04 VITALS
OXYGEN SATURATION: 97 % | HEIGHT: 64 IN | BODY MASS INDEX: 33.12 KG/M2 | WEIGHT: 194 LBS | HEART RATE: 96 BPM | DIASTOLIC BLOOD PRESSURE: 90 MMHG | SYSTOLIC BLOOD PRESSURE: 154 MMHG | TEMPERATURE: 99.2 F

## 2024-11-04 VITALS — SYSTOLIC BLOOD PRESSURE: 155 MMHG | DIASTOLIC BLOOD PRESSURE: 89 MMHG

## 2024-11-04 DIAGNOSIS — K21.9 GASTRO-ESOPHAGEAL REFLUX DISEASE W/OUT ESOPHAGITIS: ICD-10-CM

## 2024-11-04 DIAGNOSIS — F41.9 ANXIETY DISORDER, UNSPECIFIED: ICD-10-CM

## 2024-11-04 DIAGNOSIS — E11.9 TYPE 2 DIABETES MELLITUS W/OUT COMPLICATIONS: ICD-10-CM

## 2024-11-04 DIAGNOSIS — J06.9 ACUTE UPPER RESPIRATORY INFECTION, UNSPECIFIED: ICD-10-CM

## 2024-11-04 DIAGNOSIS — I10 ESSENTIAL (PRIMARY) HYPERTENSION: ICD-10-CM

## 2024-11-04 DIAGNOSIS — F32.A DEPRESSION, UNSPECIFIED: ICD-10-CM

## 2024-11-04 DIAGNOSIS — Z00.00 ENCOUNTER FOR GENERAL ADULT MEDICAL EXAMINATION WITHOUT ABNORMAL FINDINGS: ICD-10-CM

## 2024-11-04 DIAGNOSIS — E10.9 TYPE 1 DIABETES MELLITUS W/OUT COMPLICATIONS: ICD-10-CM

## 2024-11-04 PROCEDURE — 99214 OFFICE O/P EST MOD 30 MIN: CPT

## 2024-11-04 PROCEDURE — G2211 COMPLEX E/M VISIT ADD ON: CPT | Mod: NC

## 2024-11-04 RX ORDER — ALBUTEROL SULFATE 2.5 MG/3ML
(2.5 MG/3ML) SOLUTION RESPIRATORY (INHALATION)
Qty: 90 | Refills: 3 | Status: ACTIVE | COMMUNITY
Start: 2024-10-30 | End: 1900-01-01

## 2024-11-06 ENCOUNTER — APPOINTMENT (OUTPATIENT)
Dept: SLEEP CENTER | Facility: HOSPITAL | Age: 39
End: 2024-11-06

## 2024-11-06 ENCOUNTER — EMERGENCY (EMERGENCY)
Facility: HOSPITAL | Age: 39
LOS: 0 days | Discharge: HOME | End: 2024-11-06
Attending: STUDENT IN AN ORGANIZED HEALTH CARE EDUCATION/TRAINING PROGRAM
Payer: MEDICAID

## 2024-11-06 VITALS
OXYGEN SATURATION: 100 % | SYSTOLIC BLOOD PRESSURE: 119 MMHG | TEMPERATURE: 98 F | HEIGHT: 64 IN | DIASTOLIC BLOOD PRESSURE: 63 MMHG | RESPIRATION RATE: 18 BRPM | HEART RATE: 107 BPM

## 2024-11-06 DIAGNOSIS — K21.9 GASTRO-ESOPHAGEAL REFLUX DISEASE WITHOUT ESOPHAGITIS: ICD-10-CM

## 2024-11-06 DIAGNOSIS — Z96.41 PRESENCE OF INSULIN PUMP (EXTERNAL) (INTERNAL): ICD-10-CM

## 2024-11-06 DIAGNOSIS — J02.9 ACUTE PHARYNGITIS, UNSPECIFIED: ICD-10-CM

## 2024-11-06 DIAGNOSIS — Z88.7 ALLERGY STATUS TO SERUM AND VACCINE: ICD-10-CM

## 2024-11-06 DIAGNOSIS — Z88.0 ALLERGY STATUS TO PENICILLIN: ICD-10-CM

## 2024-11-06 DIAGNOSIS — R05.1 ACUTE COUGH: ICD-10-CM

## 2024-11-06 DIAGNOSIS — R50.9 FEVER, UNSPECIFIED: ICD-10-CM

## 2024-11-06 DIAGNOSIS — M79.10 MYALGIA, UNSPECIFIED SITE: ICD-10-CM

## 2024-11-06 DIAGNOSIS — Z88.1 ALLERGY STATUS TO OTHER ANTIBIOTIC AGENTS: ICD-10-CM

## 2024-11-06 DIAGNOSIS — F32.A DEPRESSION, UNSPECIFIED: ICD-10-CM

## 2024-11-06 DIAGNOSIS — R06.2 WHEEZING: ICD-10-CM

## 2024-11-06 PROCEDURE — 99284 EMERGENCY DEPT VISIT MOD MDM: CPT

## 2024-11-06 PROCEDURE — 99283 EMERGENCY DEPT VISIT LOW MDM: CPT

## 2024-11-06 PROCEDURE — 0241U: CPT

## 2024-11-06 NOTE — ED PROVIDER NOTE - CLINICAL SUMMARY MEDICAL DECISION MAKING FREE TEXT BOX
39-year-old female, past medical history of DM, GERD, depression, presents to the ED from assisted living for continued productive cough for 1 week. asking for covid/flu testing. sent pt did not want to wait for results, discharged with return precautions

## 2024-11-06 NOTE — ED PROVIDER NOTE - PHYSICAL EXAMINATION
GENERAL: Well-developed; well-nourished; in no acute distress.   SKIN: warm, dry  HEAD: Normocephalic; atraumatic.  EYES: 3mm pupils, PERRLA, EOMI, no conjunctival erythema  ENT: No nasal discharge; airway clear. MMM   NECK: Supple; non tender.  CARD: Regular rate and rhythm. S1, S2 normal; no murmurs, gallops, or rubs.   RESP: Wet cough. LCTAB; No wheezes, rales, rhonchi, or stridor. Speaking in full sentences   ABD: soft, nontender, nondistended  EXT: Normal ROM.  No LE TTP or edema bilaterally.  NEURO: A/ox3, grossly unremarkable  PSYCH: Cooperative, appropriate.

## 2024-11-06 NOTE — ED PROVIDER NOTE - ATTENDING CONTRIBUTION TO CARE
39-year-old female, past medical history of DM1 ( on insuline pump), GERD, depression, presents to the ED from assisted living for continued productive cough for 1 week has been seen by pmd was told might have viral URI here for getting covid flu testing  CONSTITUTIONAL: well developed; in no acute distress  HEAD: normocephalic; atraumatic  EYES: no conjunctival injection, no scleral icterus  ENT: no nasal discharge; airway clear.  NECK: supple; non tender.  CARD: warm and well perfused, not tachycardic  RESP: breathing comfortably on RA, speaking in full sentences w/o distress  Abdomen: Soft, None Tender, None Distended   EXT: moving all extremities spontaneously, normal ROM.  SKIN: warm and dry, no lesions noted  NEURO: alert, oriented, motor and sensory grossly intact, speech nonslurred  PSYCH: calm, cooperative  will do flu/covid and reevaluate

## 2024-11-06 NOTE — ED ADULT TRIAGE NOTE - CHIEF COMPLAINT QUOTE
Pt c/o productive cough, sore throat & fever x 1 week. Pt states she also had some wheezing in the last two days. EMS gave 2 duonebs in transit. Pt denies SOB, chest pain

## 2024-11-06 NOTE — ED PROVIDER NOTE - PATIENT PORTAL LINK FT
You can access the FollowMyHealth Patient Portal offered by [facility] by registering at the following website: [email or web address]. By joining [employer]’s FollowMyHealth portal, you will also be able to view your health information using other applications (apps) compatible with our system.

## 2024-11-06 NOTE — ED PROVIDER NOTE - NSFOLLOWUPINSTRUCTIONS_ED_ALL_ED_FT
FOLLOW UP WITH YOUR DOCTOR THIS WEEK FOR REEVALUATION.     RETURN TO ED IMMEDIATELY WITH ANY WORSENING SYMPTOMS, CHEST PAIN, SHORTNESS OF BREATH, ABDOMINAL PAIN, FEVERS, WEAKNESS, DIZZINESS, PERSISTENT OR SEVERE HEADACHE OR ANY OTHER CONCERNS.    Acute Cough    AMBULATORY CARE:    An acute cough can last up to 3 weeks. Common causes of an acute cough include a cold, allergies, or a lung infection.     Seek care immediately if:   -You have trouble breathing or feel short of breath.  -You cough up blood, or you see blood in your mucus.   -You faint or feel weak or dizzy.   -You have chest pain when you cough or take a deep breath.   -You have new wheezing.     Contact your healthcare provider if:   -You have a fever.   -Your cough lasts longer than 4 weeks.   -Your symptoms do not improve with treatment.   -You have questions or concerns about your condition or care.     Treatment: An acute cough usually goes away on its own. Ask your healthcare provider about medicines you can take to decrease your cough. You may need medicine to stop the cough, decrease swelling in your airways, or help open your airways. Medicine may also be given to help you cough up mucus. If you have an infection caused by bacteria, you may need antibiotics.     Manage your symptoms:     Do not smoke and stay away from others who smoke. Nicotine and other chemicals in cigarettes and cigars can cause lung damage and make your cough worse. Ask your healthcare provider for information if you currently smoke and need help to quit. E-cigarettes or smokeless tobacco still contain nicotine. Talk to your healthcare provider before you use these products.       Drink extra liquids as directed. Liquids will help thin and loosen mucus so you can cough it up. Liquids will also help prevent dehydration. Examples of good liquids to drink include water, fruit juice, and broth. Do not drink liquids that contain caffeine. Caffeine can increase your risk for dehydration. Ask your healthcare provider how much liquid to drink each day.       Rest as directed. Do not do activities that make your cough worse, such as exercise.       Use a humidifier or vaporizer. Use a cool mist humidifier or a vaporizer to increase air moisture in your home. This may make it easier for you to breathe and help decrease your cough.       Eat 2 to 5 mL of honey 2 times each day. Honey can help thin mucus and decrease your cough.       Use cough drops or lozenges. These can help decrease throat irritation and your cough.     Follow up with your healthcare provider as directed: Write down your questions so you remember to ask them during your visits.

## 2024-11-06 NOTE — ED PROVIDER NOTE - OBJECTIVE STATEMENT
39-year-old female, past medical history of DM, GERD, depression, presents to the ED from assisted living for continued productive cough for 1 week.  Patient initially had fever but has not had an elevated temperature over the past 4 days.  Also complains of sore throat, wheezing, myalgias.  Given DuoNeb by EMS prior to arrival.  Denies chest pain, SOB, known sick contacts, nausea, vomiting, diarrhea, abdominal pain.  Saw her PMD on Monday who told her she had a virus and to use her albuterol inhaler.

## 2024-11-07 ENCOUNTER — INPATIENT (INPATIENT)
Facility: HOSPITAL | Age: 39
LOS: 5 days | Discharge: HOME | DRG: 813 | End: 2024-11-13
Attending: STUDENT IN AN ORGANIZED HEALTH CARE EDUCATION/TRAINING PROGRAM | Admitting: STUDENT IN AN ORGANIZED HEALTH CARE EDUCATION/TRAINING PROGRAM
Payer: MEDICAID

## 2024-11-07 ENCOUNTER — NON-APPOINTMENT (OUTPATIENT)
Age: 39
End: 2024-11-07

## 2024-11-07 VITALS
HEIGHT: 64 IN | RESPIRATION RATE: 18 BRPM | TEMPERATURE: 98 F | WEIGHT: 192.02 LBS | HEART RATE: 96 BPM | SYSTOLIC BLOOD PRESSURE: 128 MMHG | OXYGEN SATURATION: 98 % | DIASTOLIC BLOOD PRESSURE: 80 MMHG

## 2024-11-07 DIAGNOSIS — R73.9 HYPERGLYCEMIA, UNSPECIFIED: ICD-10-CM

## 2024-11-07 LAB
ALBUMIN SERPL ELPH-MCNC: 4.1 G/DL — SIGNIFICANT CHANGE UP (ref 3.5–5.2)
ALP SERPL-CCNC: 165 U/L — HIGH (ref 30–115)
ALT FLD-CCNC: 50 U/L — HIGH (ref 0–41)
ANION GAP SERPL CALC-SCNC: 13 MMOL/L — SIGNIFICANT CHANGE UP (ref 7–14)
APPEARANCE UR: CLEAR — SIGNIFICANT CHANGE UP
AST SERPL-CCNC: 29 U/L — SIGNIFICANT CHANGE UP (ref 0–41)
B-OH-BUTYR SERPL-SCNC: <0.2 MMOL/L — SIGNIFICANT CHANGE UP
BASE EXCESS BLDV CALC-SCNC: -0.4 MMOL/L — SIGNIFICANT CHANGE UP (ref -2–3)
BASOPHILS # BLD AUTO: 0.06 K/UL — SIGNIFICANT CHANGE UP (ref 0–0.2)
BASOPHILS NFR BLD AUTO: 0.5 % — SIGNIFICANT CHANGE UP (ref 0–1)
BILIRUB SERPL-MCNC: 0.4 MG/DL — SIGNIFICANT CHANGE UP (ref 0.2–1.2)
BILIRUB UR-MCNC: NEGATIVE — SIGNIFICANT CHANGE UP
BUN SERPL-MCNC: 11 MG/DL — SIGNIFICANT CHANGE UP (ref 10–20)
CA-I SERPL-SCNC: 1.18 MMOL/L — SIGNIFICANT CHANGE UP (ref 1.15–1.33)
CA-I SERPL-SCNC: 1.22 MMOL/L — SIGNIFICANT CHANGE UP (ref 1.15–1.33)
CALCIUM SERPL-MCNC: 9.5 MG/DL — SIGNIFICANT CHANGE UP (ref 8.4–10.5)
CHLORIDE SERPL-SCNC: 96 MMOL/L — LOW (ref 98–110)
CO2 SERPL-SCNC: 24 MMOL/L — SIGNIFICANT CHANGE UP (ref 17–32)
COLOR SPEC: YELLOW — SIGNIFICANT CHANGE UP
CREAT SERPL-MCNC: 0.6 MG/DL — LOW (ref 0.7–1.5)
DIFF PNL FLD: NEGATIVE — SIGNIFICANT CHANGE UP
EGFR: 117 ML/MIN/1.73M2 — SIGNIFICANT CHANGE UP
EOSINOPHIL # BLD AUTO: 0.08 K/UL — SIGNIFICANT CHANGE UP (ref 0–0.7)
EOSINOPHIL NFR BLD AUTO: 0.6 % — SIGNIFICANT CHANGE UP (ref 0–8)
GAS PNL BLDV: 128 MMOL/L — LOW (ref 136–145)
GAS PNL BLDV: 129 MMOL/L — LOW (ref 136–145)
GAS PNL BLDV: SIGNIFICANT CHANGE UP
GLUCOSE BLDC GLUCOMTR-MCNC: 474 MG/DL — CRITICAL HIGH (ref 70–99)
GLUCOSE BLDC GLUCOMTR-MCNC: 517 MG/DL — CRITICAL HIGH (ref 70–99)
GLUCOSE BLDC GLUCOMTR-MCNC: >600 MG/DL — CRITICAL HIGH (ref 70–99)
GLUCOSE BLDC GLUCOMTR-MCNC: >600 MG/DL — CRITICAL HIGH (ref 70–99)
GLUCOSE SERPL-MCNC: 488 MG/DL — CRITICAL HIGH (ref 70–99)
GLUCOSE UR QL: >=1000 MG/DL
HCG SERPL QL: NEGATIVE — SIGNIFICANT CHANGE UP
HCO3 BLDV-SCNC: 25 MMOL/L — SIGNIFICANT CHANGE UP (ref 22–29)
HCO3 BLDV-SCNC: 25 MMOL/L — SIGNIFICANT CHANGE UP (ref 22–29)
HCT VFR BLD CALC: 34.9 % — LOW (ref 37–47)
HCT VFR BLDA CALC: 33 % — LOW (ref 34.5–46.5)
HCT VFR BLDA CALC: 35 % — SIGNIFICANT CHANGE UP (ref 34.5–46.5)
HGB BLD CALC-MCNC: 10.9 G/DL — LOW (ref 11.7–16.1)
HGB BLD CALC-MCNC: 11.8 G/DL — SIGNIFICANT CHANGE UP (ref 11.7–16.1)
HGB BLD-MCNC: 10.6 G/DL — LOW (ref 12–16)
IMM GRANULOCYTES NFR BLD AUTO: 0.6 % — HIGH (ref 0.1–0.3)
KETONES UR-MCNC: NEGATIVE MG/DL — SIGNIFICANT CHANGE UP
LACTATE BLDV-MCNC: 1.6 MMOL/L — SIGNIFICANT CHANGE UP (ref 0.5–2)
LACTATE BLDV-MCNC: 2.1 MMOL/L — HIGH (ref 0.5–2)
LEUKOCYTE ESTERASE UR-ACNC: NEGATIVE — SIGNIFICANT CHANGE UP
LIDOCAIN IGE QN: 13 U/L — SIGNIFICANT CHANGE UP (ref 7–60)
LYMPHOCYTES # BLD AUTO: 1.81 K/UL — SIGNIFICANT CHANGE UP (ref 1.2–3.4)
LYMPHOCYTES # BLD AUTO: 13.6 % — LOW (ref 20.5–51.1)
MAGNESIUM SERPL-MCNC: 1.9 MG/DL — SIGNIFICANT CHANGE UP (ref 1.8–2.4)
MCHC RBC-ENTMCNC: 24 PG — LOW (ref 27–31)
MCHC RBC-ENTMCNC: 30.4 G/DL — LOW (ref 32–37)
MCV RBC AUTO: 79 FL — LOW (ref 81–99)
MONOCYTES # BLD AUTO: 0.76 K/UL — HIGH (ref 0.1–0.6)
MONOCYTES NFR BLD AUTO: 5.7 % — SIGNIFICANT CHANGE UP (ref 1.7–9.3)
NEUTROPHILS # BLD AUTO: 10.52 K/UL — HIGH (ref 1.4–6.5)
NEUTROPHILS NFR BLD AUTO: 79 % — HIGH (ref 42.2–75.2)
NITRITE UR-MCNC: NEGATIVE — SIGNIFICANT CHANGE UP
NRBC # BLD: 0 /100 WBCS — SIGNIFICANT CHANGE UP (ref 0–0)
PCO2 BLDV: 40 MMHG — SIGNIFICANT CHANGE UP (ref 39–42)
PCO2 BLDV: 42 MMHG — SIGNIFICANT CHANGE UP (ref 39–42)
PH BLDV: 7.38 — SIGNIFICANT CHANGE UP (ref 7.32–7.43)
PH BLDV: 7.4 — SIGNIFICANT CHANGE UP (ref 7.32–7.43)
PH UR: 6.5 — SIGNIFICANT CHANGE UP (ref 5–8)
PHOSPHATE SERPL-MCNC: 3.1 MG/DL — SIGNIFICANT CHANGE UP (ref 2.1–4.9)
PLATELET # BLD AUTO: 519 K/UL — HIGH (ref 130–400)
PMV BLD: 10.9 FL — HIGH (ref 7.4–10.4)
PO2 BLDV: 52 MMHG — HIGH (ref 25–45)
PO2 BLDV: 54 MMHG — HIGH (ref 25–45)
POTASSIUM BLDV-SCNC: 4.8 MMOL/L — SIGNIFICANT CHANGE UP (ref 3.5–5.1)
POTASSIUM BLDV-SCNC: 8 MMOL/L — CRITICAL HIGH (ref 3.5–5.1)
POTASSIUM SERPL-MCNC: 5.3 MMOL/L — HIGH (ref 3.5–5)
POTASSIUM SERPL-SCNC: 5.3 MMOL/L — HIGH (ref 3.5–5)
PROT SERPL-MCNC: 7.3 G/DL — SIGNIFICANT CHANGE UP (ref 6–8)
PROT UR-MCNC: NEGATIVE MG/DL — SIGNIFICANT CHANGE UP
RBC # BLD: 4.42 M/UL — SIGNIFICANT CHANGE UP (ref 4.2–5.4)
RBC # FLD: 16 % — HIGH (ref 11.5–14.5)
SAO2 % BLDV: 84.7 % — SIGNIFICANT CHANGE UP (ref 67–88)
SAO2 % BLDV: 85.4 % — SIGNIFICANT CHANGE UP (ref 67–88)
SODIUM SERPL-SCNC: 133 MMOL/L — LOW (ref 135–146)
SP GR SPEC: >1.03 — HIGH (ref 1–1.03)
UROBILINOGEN FLD QL: 0.2 MG/DL — SIGNIFICANT CHANGE UP (ref 0.2–1)
WBC # BLD: 13.31 K/UL — HIGH (ref 4.8–10.8)
WBC # FLD AUTO: 13.31 K/UL — HIGH (ref 4.8–10.8)

## 2024-11-07 PROCEDURE — 84145 PROCALCITONIN (PCT): CPT

## 2024-11-07 PROCEDURE — 80053 COMPREHEN METABOLIC PANEL: CPT

## 2024-11-07 PROCEDURE — 87591 N.GONORRHOEAE DNA AMP PROB: CPT

## 2024-11-07 PROCEDURE — 87491 CHLMYD TRACH DNA AMP PROBE: CPT

## 2024-11-07 PROCEDURE — 84100 ASSAY OF PHOSPHORUS: CPT

## 2024-11-07 PROCEDURE — 71045 X-RAY EXAM CHEST 1 VIEW: CPT | Mod: 26

## 2024-11-07 PROCEDURE — 80048 BASIC METABOLIC PNL TOTAL CA: CPT

## 2024-11-07 PROCEDURE — 85025 COMPLETE CBC W/AUTO DIFF WBC: CPT

## 2024-11-07 PROCEDURE — 81001 URINALYSIS AUTO W/SCOPE: CPT

## 2024-11-07 PROCEDURE — 82962 GLUCOSE BLOOD TEST: CPT

## 2024-11-07 PROCEDURE — 99223 1ST HOSP IP/OBS HIGH 75: CPT

## 2024-11-07 PROCEDURE — 99285 EMERGENCY DEPT VISIT HI MDM: CPT

## 2024-11-07 PROCEDURE — 87661 TRICHOMONAS VAGINALIS AMPLIF: CPT

## 2024-11-07 PROCEDURE — 87798 DETECT AGENT NOS DNA AMP: CPT

## 2024-11-07 PROCEDURE — 83036 HEMOGLOBIN GLYCOSYLATED A1C: CPT

## 2024-11-07 PROCEDURE — 94640 AIRWAY INHALATION TREATMENT: CPT

## 2024-11-07 PROCEDURE — 87801 DETECT AGNT MULT DNA AMPLI: CPT

## 2024-11-07 PROCEDURE — 83735 ASSAY OF MAGNESIUM: CPT

## 2024-11-07 PROCEDURE — 83930 ASSAY OF BLOOD OSMOLALITY: CPT

## 2024-11-07 PROCEDURE — 36415 COLL VENOUS BLD VENIPUNCTURE: CPT

## 2024-11-07 RX ORDER — IPRATROPIUM BROMIDE AND ALBUTEROL SULFATE .5; 2.5 MG/3ML; MG/3ML
3 SOLUTION RESPIRATORY (INHALATION) ONCE
Refills: 0 | Status: COMPLETED | OUTPATIENT
Start: 2024-11-07 | End: 2024-11-07

## 2024-11-07 RX ORDER — FLUTICASONE PROPIONATE AND SALMETEROL XINAFOATE 230; 21 UG/1; UG/1
1 AEROSOL, METERED RESPIRATORY (INHALATION)
Refills: 0 | Status: DISCONTINUED | OUTPATIENT
Start: 2024-11-07 | End: 2024-11-07

## 2024-11-07 RX ORDER — INSULIN REG, HUM S-S BUFF 100/ML
10 VIAL (ML) INJECTION ONCE
Refills: 0 | Status: COMPLETED | OUTPATIENT
Start: 2024-11-07 | End: 2024-11-07

## 2024-11-07 RX ORDER — INSULIN GLARGINE,HUM.REC.ANLOG 100/ML
30 VIAL (ML) SUBCUTANEOUS AT BEDTIME
Refills: 0 | Status: DISCONTINUED | OUTPATIENT
Start: 2024-11-07 | End: 2024-11-07

## 2024-11-07 RX ORDER — INSULIN LISPRO 100/ML
15 VIAL (ML) SUBCUTANEOUS ONCE
Refills: 0 | Status: COMPLETED | OUTPATIENT
Start: 2024-11-07 | End: 2024-11-07

## 2024-11-07 RX ORDER — GLUCAGON INJECTION, SOLUTION 1 MG/.2ML
1 INJECTION, SOLUTION SUBCUTANEOUS ONCE
Refills: 0 | Status: DISCONTINUED | OUTPATIENT
Start: 2024-11-07 | End: 2024-11-13

## 2024-11-07 RX ORDER — INSULIN REG, HUM S-S BUFF 100/ML
8 VIAL (ML) INJECTION ONCE
Refills: 0 | Status: COMPLETED | OUTPATIENT
Start: 2024-11-07 | End: 2024-11-07

## 2024-11-07 RX ORDER — AZITHROMYCIN DIHYDRATE 200 MG/5ML
500 POWDER, FOR SUSPENSION ORAL ONCE
Refills: 0 | Status: COMPLETED | OUTPATIENT
Start: 2024-11-07 | End: 2024-11-07

## 2024-11-07 RX ORDER — INSULIN LISPRO 100/ML
VIAL (ML) SUBCUTANEOUS
Refills: 0 | Status: DISCONTINUED | OUTPATIENT
Start: 2024-11-07 | End: 2024-11-07

## 2024-11-07 RX ORDER — AMLODIPINE BESYLATE 10 MG
1 TABLET ORAL
Refills: 0 | DISCHARGE

## 2024-11-07 RX ORDER — METOPROLOL TARTRATE 50 MG
1 TABLET ORAL
Refills: 0 | DISCHARGE

## 2024-11-07 RX ORDER — MAGNESIUM SULFATE IN 0.9% NACL 2 G/50 ML
1 INTRAVENOUS SOLUTION, PIGGYBACK (ML) INTRAVENOUS ONCE
Refills: 0 | Status: COMPLETED | OUTPATIENT
Start: 2024-11-07 | End: 2024-11-07

## 2024-11-07 RX ORDER — SERTRALINE HYDROCHLORIDE 50 MG/1
25 TABLET, FILM COATED ORAL DAILY
Refills: 0 | Status: DISCONTINUED | OUTPATIENT
Start: 2024-11-07 | End: 2024-11-13

## 2024-11-07 RX ORDER — DEXAMETHASONE 1.5 MG 1.5 MG/1
10 TABLET ORAL ONCE
Refills: 0 | Status: COMPLETED | OUTPATIENT
Start: 2024-11-07 | End: 2024-11-07

## 2024-11-07 RX ORDER — FAMOTIDINE 10 MG/ML
1 INJECTION INTRAVENOUS
Refills: 0 | DISCHARGE

## 2024-11-07 RX ORDER — RIMEGEPANT SULFATE 75 MG/75MG
1 TABLET, ORALLY DISINTEGRATING ORAL
Refills: 0 | DISCHARGE

## 2024-11-07 RX ORDER — AZELASTINE HYDROCHLORIDE 137 UG/1
2 SPRAY, METERED NASAL
Refills: 0 | DISCHARGE

## 2024-11-07 RX ORDER — INSULIN GLARGINE,HUM.REC.ANLOG 100/ML
10 VIAL (ML) SUBCUTANEOUS ONCE
Refills: 0 | Status: COMPLETED | OUTPATIENT
Start: 2024-11-07 | End: 2024-11-07

## 2024-11-07 RX ORDER — ENOXAPARIN SODIUM 40MG/0.4ML
40 SYRINGE (ML) SUBCUTANEOUS EVERY 24 HOURS
Refills: 0 | Status: DISCONTINUED | OUTPATIENT
Start: 2024-11-07 | End: 2024-11-13

## 2024-11-07 RX ORDER — AZITHROMYCIN DIHYDRATE 200 MG/5ML
500 POWDER, FOR SUSPENSION ORAL EVERY 24 HOURS
Refills: 0 | Status: COMPLETED | OUTPATIENT
Start: 2024-11-08 | End: 2024-11-09

## 2024-11-07 RX ORDER — INSULIN LISPRO 100/ML
8 VIAL (ML) SUBCUTANEOUS
Refills: 0 | Status: DISCONTINUED | OUTPATIENT
Start: 2024-11-07 | End: 2024-11-07

## 2024-11-07 RX ORDER — IPRATROPIUM BROMIDE AND ALBUTEROL SULFATE .5; 2.5 MG/3ML; MG/3ML
3 SOLUTION RESPIRATORY (INHALATION) EVERY 6 HOURS
Refills: 0 | Status: DISCONTINUED | OUTPATIENT
Start: 2024-11-07 | End: 2024-11-13

## 2024-11-07 RX ORDER — SODIUM CHLORIDE 9 MG/ML
1000 INJECTION, SOLUTION INTRAMUSCULAR; INTRAVENOUS; SUBCUTANEOUS
Refills: 0 | Status: DISCONTINUED | OUTPATIENT
Start: 2024-11-07 | End: 2024-11-08

## 2024-11-07 RX ORDER — METOPROLOL TARTRATE 50 MG
25 TABLET ORAL THREE TIMES A DAY
Refills: 0 | Status: DISCONTINUED | OUTPATIENT
Start: 2024-11-07 | End: 2024-11-13

## 2024-11-07 RX ORDER — AMLODIPINE BESYLATE 10 MG
5 TABLET ORAL DAILY
Refills: 0 | Status: DISCONTINUED | OUTPATIENT
Start: 2024-11-07 | End: 2024-11-13

## 2024-11-07 RX ORDER — INSULIN LISPRO 100/ML
12 VIAL (ML) SUBCUTANEOUS
Refills: 0 | Status: DISCONTINUED | OUTPATIENT
Start: 2024-11-07 | End: 2024-11-09

## 2024-11-07 RX ORDER — INSULIN GLARGINE,HUM.REC.ANLOG 100/ML
41 VIAL (ML) SUBCUTANEOUS AT BEDTIME
Refills: 0 | Status: DISCONTINUED | OUTPATIENT
Start: 2024-11-08 | End: 2024-11-08

## 2024-11-07 RX ORDER — PANTOPRAZOLE SODIUM 40 MG/1
40 TABLET, DELAYED RELEASE ORAL
Refills: 0 | Status: DISCONTINUED | OUTPATIENT
Start: 2024-11-07 | End: 2024-11-13

## 2024-11-07 RX ORDER — FAMOTIDINE 10 MG/ML
40 INJECTION INTRAVENOUS DAILY
Refills: 0 | Status: DISCONTINUED | OUTPATIENT
Start: 2024-11-07 | End: 2024-11-13

## 2024-11-07 RX ORDER — INSULIN LISPRO 100/ML
VIAL (ML) SUBCUTANEOUS
Refills: 0 | Status: DISCONTINUED | OUTPATIENT
Start: 2024-11-07 | End: 2024-11-11

## 2024-11-07 RX ORDER — INSULIN LISPRO 100/ML
VIAL (ML) SUBCUTANEOUS AT BEDTIME
Refills: 0 | Status: DISCONTINUED | OUTPATIENT
Start: 2024-11-07 | End: 2024-11-11

## 2024-11-07 RX ORDER — IPRATROPIUM BROMIDE AND ALBUTEROL SULFATE .5; 2.5 MG/3ML; MG/3ML
3 SOLUTION RESPIRATORY (INHALATION) EVERY 4 HOURS
Refills: 0 | Status: DISCONTINUED | OUTPATIENT
Start: 2024-11-07 | End: 2024-11-13

## 2024-11-07 RX ADMIN — Medication 10 UNIT(S): at 21:03

## 2024-11-07 RX ADMIN — SODIUM CHLORIDE 100 MILLILITER(S): 9 INJECTION, SOLUTION INTRAMUSCULAR; INTRAVENOUS; SUBCUTANEOUS at 22:26

## 2024-11-07 RX ADMIN — Medication 10 UNIT(S): at 22:37

## 2024-11-07 RX ADMIN — Medication 30 UNIT(S): at 21:03

## 2024-11-07 RX ADMIN — Medication 1000 MILLILITER(S): at 15:10

## 2024-11-07 RX ADMIN — IPRATROPIUM BROMIDE AND ALBUTEROL SULFATE 3 MILLILITER(S): .5; 2.5 SOLUTION RESPIRATORY (INHALATION) at 17:00

## 2024-11-07 RX ADMIN — Medication 4: at 22:37

## 2024-11-07 RX ADMIN — Medication 8 UNIT(S): at 14:38

## 2024-11-07 RX ADMIN — IPRATROPIUM BROMIDE AND ALBUTEROL SULFATE 3 MILLILITER(S): .5; 2.5 SOLUTION RESPIRATORY (INHALATION) at 14:45

## 2024-11-07 RX ADMIN — Medication 1000 MILLILITER(S): at 13:08

## 2024-11-07 RX ADMIN — AZITHROMYCIN DIHYDRATE 255 MILLIGRAM(S): 200 POWDER, FOR SUSPENSION ORAL at 23:53

## 2024-11-07 RX ADMIN — AZITHROMYCIN DIHYDRATE 255 MILLIGRAM(S): 200 POWDER, FOR SUSPENSION ORAL at 15:52

## 2024-11-07 RX ADMIN — IPRATROPIUM BROMIDE AND ALBUTEROL SULFATE 3 MILLILITER(S): .5; 2.5 SOLUTION RESPIRATORY (INHALATION) at 14:11

## 2024-11-07 RX ADMIN — Medication 15 UNIT(S): at 21:48

## 2024-11-07 RX ADMIN — DEXAMETHASONE 1.5 MG 102 MILLIGRAM(S): 1.5 TABLET ORAL at 14:10

## 2024-11-07 RX ADMIN — Medication 100 GRAM(S): at 22:26

## 2024-11-07 RX ADMIN — Medication 12: at 19:38

## 2024-11-07 RX ADMIN — Medication 40 MILLIGRAM(S): at 23:53

## 2024-11-07 RX ADMIN — Medication 10 UNIT(S): at 18:18

## 2024-11-07 NOTE — H&P ADULT - HISTORY OF PRESENT ILLNESS
39 year old female with PMH of:  - DM1 (insulin pump)  - depression  - anxiety  - asthma   - GERD   - HTN   - obesity     presenting for hyperglycemia.   Patient reports that today she disconnected the insulin pump to take a shower then was not able to re-connect it. Even the nurse at the assisted living tried to help but was not able to connect it as well.   her fingerstick glucose was >500 so she presented to the ED     Additionally, patient reports that 10 days ago she had a viral illness with fever and cough and was treated symptomatically; but she's still having a productive cough (greenish) without fever or other upper resp symptoms.     Patient denies chest pain, dizziness, nausea, vomiting, abdominal pain, dysuria, or additional concerns.        in the ED   Vitals   · BP Systolic	128 mm Hg  · BP Diastolic	80 mm Hg  · Heart Rate	96 /min  · Respiration Rate (breaths/min)	18 /min  · Temp (C)	36.4 Degrees C  · Temp site	oral  · SpO2 (%)	98 % room air      CXR: No radiographic evidence of acute cardiopulmonary disease.    WBC 13K  Hgb 10.6 (baseline 11.7)    Plt 519  Na 133  K+ 5.3  Bicarb 24   Gap 13   Creat 0.6  lactate 1.6  pH 7.38 (VBG)    Urine: no ketones / gravity >1030 // Glucose > 1000  // negative for infection   COVID/ FLU / RSV: negative    ECG: Nl sinus    Received 2 L LR and IV insulin in the ED  39 year old female with PMH of:  - DM1 (insulin pump)  - depression  - anxiety  - asthma   - GERD   - HTN   - obesity     presenting for hyperglycemia.   Patient reports that today she disconnected the insulin pump to take a shower then was not able to re-connect it. Even the nurse at the assisted living tried to help but was not able to connect it as well.   her fingerstick glucose was >500 so she presented to the ED     Additionally, patient reports that 10 days ago she had a viral illness with fever and cough and was treated symptomatically; but she's still having a productive cough (greenish) without fever or other upper resp symptoms.     Patient denies chest pain, dizziness, nausea, vomiting, abdominal pain, dysuria, or additional concerns.        in the ED   Vitals   · BP Systolic	128 mm Hg  · BP Diastolic	80 mm Hg  · Heart Rate	96 /min  · Respiration Rate (breaths/min)	18 /min  · Temp (C)	36.4 Degrees C  · Temp site	oral  · SpO2 (%)	98 % room air      CXR: No radiographic evidence of acute cardiopulmonary disease.    WBC 13K  Hgb 10.6 (baseline 11.7)    Plt 519  Na 133  K+ 5.3  Bicarb 24   Gap 13   Creat 0.6  lactate 1.6  pH 7.38 (VBG)    Urine: no ketones / gravity >1030 // Glucose > 1000  // negative for infection   COVID/ FLU / RSV: negative    ECG: Nl sinus    Received 2 L LR and IV insulin in the ED

## 2024-11-07 NOTE — H&P ADULT - NSHPPHYSICALEXAM_GEN_ALL_CORE
LOS:     VITALS:   T(C): 36.6 (11-07-24 @ 19:53), Max: 36.7 (11-07-24 @ 16:59)  HR: 86 (11-07-24 @ 19:53) (86 - 106)  BP: 143/80 (11-07-24 @ 19:53) (128/80 - 143/80)  RR: 18 (11-07-24 @ 19:53) (18 - 18)  SpO2: 99% (11-07-24 @ 20:36) (93% - 100%)    GENERAL: NAD, lying in bed comfortably  HEAD:  Atraumatic, Normocephalic  EYES: EOMI, PERRLA, conjunctiva and sclera clear  ENT: Moist mucous membranes  NECK: Supple, No JVD  CHEST/LUNG: no wheezing; rhonchi LLL. Unlabored respirations  HEART: Regular rate and rhythm; No murmurs, rubs, or gallops  ABDOMEN: BSx4; Soft, nontender, nondistended  EXTREMITIES:  2+ Peripheral Pulses, brisk capillary refill. No clubbing, cyanosis, or edema  NERVOUS SYSTEM:  A&Ox3, no focal deficits   SKIN: No rashes or lesions

## 2024-11-07 NOTE — PATIENT PROFILE ADULT - FUNCTIONAL ASSESSMENT - BASIC MOBILITY ASSESSMENT TYPE
[Admission

## 2024-11-07 NOTE — ED ADULT TRIAGE NOTE - CHIEF COMPLAINT QUOTE
Pt. KRISTIN from [facility] for hyperglycemia. FS by . Pt. states she disconnected her insulin pump to take a shower last night and was having difficulty fixing her sugars since.

## 2024-11-07 NOTE — ED ADULT NURSE NOTE - AVIAN FLU SYMPTOMS
Called patient's mom, Cesilia, to schedule pediatric Genetics consult. No answer, left message to return call to clinic to schedule consult appointment.    No

## 2024-11-07 NOTE — ED PROVIDER NOTE - PHYSICAL EXAMINATION
VITAL SIGNS: I have reviewed nursing notes and confirm.  CONSTITUTIONAL: Well-appearing, non-toxic, in NAD  SKIN: Warm dry, normal skin turgor  HEAD: NCAT  EYES: No conjunctival injection, scleral anicteric  ENT: Moist mucous membranes, normal pharynx with no erythema or exudates  NECK: Supple; full ROM. Nontender. No cervical LAD  CARD: RRR, no murmurs, rubs or gallops  RESP: Congestion to ausculation bilaterally.  No stridor or wheezing.  ABD: Soft, distended, non-tender, no rebound or guarding. No CVA tenderness  EXT: Full ROM

## 2024-11-07 NOTE — PATIENT PROFILE ADULT - FALL HARM RISK - HARM RISK INTERVENTIONS

## 2024-11-07 NOTE — H&P ADULT - ATTENDING COMMENTS
Patient seen and examined at bedside independently of the residents. I read the resident's note and agree with the plan with the additions and corrections as noted below. My note supersedes the resident's note.     REVIEW OF SYSTEMS:  Negative except in HPI.    PMH: DM I (on insulin pump), GERD and Anxiety/Depression    FHx: Reviewed. No fhx of asthma/copd, No fhx of liver and pulmonary disease. No fhx of hematological disorder.     Physical Exam:  GEN: No acute distress. Awake, Alert and oriented x 3.   Head: Atraumatic, Normocephalic.   Eye: PEERLA. No sclera icterus. EOMI.   ENT: Normal oropharynx, no thyromegaly, no mass, no lymphadenopathy.   LUNGS: Clear to auscultation bilaterally. No wheeze/rales/crackles.   HEART: Normal. S1/S2 present. RRR. No murmur/gallops.   ABD: Soft, non-tender, non-distended. Bowel sounds present.   EXT: No pitting edema. No erythema. No tenderness.  Integumentary: No rash, No sore, No petechia.   NEURO: CN III-XII intact. Strength: 5/5 b/l ULE. Sensory intact b/l ULE.     Vital Signs Last 24 Hrs  T(C): 36.7 (2024 22:45), Max: 36.7 (2024 16:59)  T(F): 98 (2024 22:45), Max: 98 (2024 16:59)  HR: 105 (2024 22:45) (86 - 106)  BP: 128/69 (2024 22:45) (128/69 - 143/80)  BP(mean): 101 (2024 19:53) (99 - 101)  RR: 18 (2024 22:45) (18 - 18)  SpO2: 99% (2024 22:45) (93% - 100%)    Parameters below as of 2024 22:45  Patient On (Oxygen Delivery Method): room air      Please see the above notes for Labs and radiology.     Assessment and Plan:     38 yo F with hx of DM I (on insulin pump), HTN, GERD and Anxiety/Depression presents to ED for hyperglycemia.    Hyperglycemia 2/2 DM II with malfunction insulin pump  - No evidence of DKA currently.   - c/w Insulin regimen for now.   - Monitor FS closely.   - check HbA1c.   - c/w IVF gentle hydration for now.     Acute bronchitis   - CXR unremarkable.   - s/p decadron 10mg and albuterol nebs given in ED.  - Currently no wheezing on my exam --> hold off steroid.   - c/w albuterol prn   - will give Z-pack     HTN - c/w home med.   GERD - PPI   Anxiety/Depression - c/w home med.      DVT ppx: Lovenox SC  GI ppx: PPI  Diet: CC diet  Activity: as tolerated.      Date seen by the attendin2024  I have spent 75 minutes of time on the encounter which excludes teaching and/or separately reported services.

## 2024-11-07 NOTE — ED PROVIDER NOTE - DIFFERENTIAL DIAGNOSIS
The differential diagnosis for patients clinical presentation includes but is not limited to: DKA, HHS, hyperglycemia, pneumonia, viral uri, covid, flu Differential Diagnosis

## 2024-11-07 NOTE — ED ADULT NURSE REASSESSMENT NOTE - NS ED NURSE REASSESS COMMENT FT1
Pt with elevated FS. pt was medicated as ordered per sliding scale. pt reports eating dinner before receiving insulin. pt has no complaints a this time. attempted to call MAR @ [phone] multiples times with No answer. will call MD again and reassess finger stick.

## 2024-11-07 NOTE — ED PROVIDER NOTE - OBJECTIVE STATEMENT
Patient is a 39 year old female with PMH of DM (insulin pump), depression, anxiety, and GERD presenting for hyperglycemia. Patient reports her insulin pump has not been working since last night and now has elevated blood glucose readings. Additionally, patient reports a non-productive cough, congestion, and fevers x1 week. Patient denies chest pain, dizziness, nausea, vomiting, abdominal pain, dysuria, or additional concerns.

## 2024-11-07 NOTE — ED ADULT NURSE NOTE - CHIEF COMPLAINT QUOTE
Pt. KRISTIN from [facility] for hyperglycemia. FS by . Pt. states she disconnected her insulin pump to take a shower last night and was having difficulty fixing her sugars since.

## 2024-11-07 NOTE — H&P ADULT - ASSESSMENT
39 year old female with PMH of:  - DM1 (insulin pump)  - depression  - anxiety  - GERD     presenting for hyperglycemia.   Patient reports her insulin pump has not been working since last night and now has elevated blood glucose readings. Additionally, patient reports a non-productive cough, congestion, and fevers x1 week.     Patient denies chest pain, dizziness, nausea, vomiting, abdominal pain, dysuria, or additional concerns.        in the ED   Vitals   · BP Systolic	128 mm Hg  · BP Diastolic	80 mm Hg  · Heart Rate	96 /min  · Respiration Rate (breaths/min)	18 /min  · Temp (C)	36.4 Degrees C  · Temp site	oral  · SpO2 (%)	98 % room air      CXR: No radiographic evidence of acute cardiopulmonary disease.    WBC 13K  Hgb 10.6 (baseline 11.7)    Plt 519  Na 133  K+ 5.3  Bicarb 24   Gap 13   Creat 0.6  lactate 1.6  pH 7.38 (VBG)    Urine: no ketones / gravity >1030 // Glucose > 1000  // negative for infection   COVID/ FLU / RSV: negative    ECG: Nl sinus    Received 2 L LR and IV insulin in the ED                       #Poorly controlled type 1 DM presenting for uncontrolled glucose secondary to insulin pump malfunction  #DKA ruled out; less likely HHS   #Diabetic neuropathy  -4/1/24 A1c 121%   - at home on insulin pump   - non compliant with taking boluses     - on presentation glucose: in 400 s then > 600   - received 18 units IV of regular insulin -> glucose still > 600 -> additional 10u IV push given at 9 pm    - f up BMP (for K+)   - f up serum osm     - C/w Lantus .......   - C/w Admelog ....... pre meals   - ISS  - CC diet   - hydration       HTN, chronic back pain, migraine headaches,         #Hx of anxiety/depression  # Pseudoseizures? (noted in prior notes)  - 5 or 6 prior lifetime psychiatric admissions for suicide attempts  (once by taking a capful of acetone, another by drinking hydrogen peroxide, another attempt by taking half a bottle of ibuprofen)      #Bilateral foraminal stenosis  - MRI in 2024 -> L5-S1: Disc bulge, central disc herniation and a left central inferiorly migrating disc extrusion vs sequestration (new since 2018). Prominent epidural fat contributes to overall moderate spinal canal and bilateral foraminal stenosis, increased since 2018.    #Prior findings  - in July 2024: Too small to characterize hypodense lesions in the right hepatic lobe up to 1.5 cm.   - OP f up     #Diet: CC diet  #DVT pro: lovenox  #GI pro: pantoprazole  #Activity: as tolerated  #Dispo: med   #Med rec:   #Code status: full   #Domiciled at [facility]        39 year old female with PMH of:  - DM1 (insulin pump)  - depression  - anxiety  - GERD     presenting for hyperglycemia.   Patient reports her insulin pump has not been working since last night and now has elevated blood glucose readings. Additionally, patient reports a non-productive cough, congestion, and fevers x1 week.     Patient denies chest pain, dizziness, nausea, vomiting, abdominal pain, dysuria, or additional concerns.        in the ED   Vitals   · BP Systolic	128 mm Hg  · BP Diastolic	80 mm Hg  · Heart Rate	96 /min  · Respiration Rate (breaths/min)	18 /min  · Temp (C)	36.4 Degrees C  · Temp site	oral  · SpO2 (%)	98 % room air      CXR: No radiographic evidence of acute cardiopulmonary disease.    WBC 13K  Hgb 10.6 (baseline 11.7)    Plt 519  Na 133  K+ 5.3  Bicarb 24   Gap 13   Creat 0.6  lactate 1.6  pH 7.38 (VBG)    Urine: no ketones / gravity >1030 // Glucose > 1000  // negative for infection   COVID/ FLU / RSV: negative    ECG: Nl sinus    Received 2 L LR and IV insulin in the ED                       #Poorly controlled type 1 DM presenting for uncontrolled glucose secondary to insulin pump malfunction and viral illness   #DKA ruled out; less likely HHS   #Diabetic neuropathy  -4/1/24 A1c 121%   - at home on insulin pump   - non compliant with taking boluses     - pregnancy test negative  - Urine: no ketones / gravity >1030 // Glucose > 1000  // negative for infection   - on presentation glucose: in 400 s received 8u IV insulin but also dexamethasone IV for the wheezing -> glucose became > 600 -> received 10 units IV regular insulin but ate and drank juice -> glucose still > 600 -> additional 10u IV push given at 9 pm + 15 u SQ and lantus 30 u.    - f up BMP (for K+)   - f up serum osm   - C/w Lantus .......   - C/w Admelog ....... pre meals   - ISS  - CC diet   - IV hydration       #Asthma exacerbation likely secondary to viral illness   - wheezing on presentation  - received dexa IV and duoneb in the ED  - lactate 1.6 // pH 7.38 (VBG) // WBC 13K  - COVID/ FLU / RSV: negative  - CXR: No radiographic evidence of acute cardiopulmonary disease.  - afebrile // on room air // no PNA on CXR   - c/w duoneb standing for 24 hours  - c/w duoneb PRN      #Hx of anxiety/depression  #Hx of suicidal attempts (5-6 times)  # Pseudoseizures? (noted in prior notes)  - 5 or 6 prior lifetime psychiatric admissions for suicide attempts  (once by taking a capful of acetone, another by drinking hydrogen peroxide, another attempt by taking half a bottle of ibuprofen)    #chronic back pain  #Bilateral foraminal stenosis  - MRI in 2024 -> L5-S1: Disc bulge, central disc herniation and a left central inferiorly migrating disc extrusion vs sequestration (new since 2018). Prominent epidural fat contributes to overall moderate spinal canal and bilateral foraminal stenosis, increased since 2018.    #Migraine  - stable     #Prior findings  - in July 2024: Too small to characterize hypodense lesions in the right hepatic lobe up to 1.5 cm.   - OP f up       #Diet: CC diet  #DVT pro: lovenox  #GI pro: pantoprazole  #Activity: as tolerated  #Dispo: med   #Med rec:   #Code status: full   #Domiciled at [facility]        39 year old female with PMH of:  - DM1 (insulin pump)  - depression  - anxiety  - asthma   - GERD   - HTN   - obesity     presenting for hyperglycemia.   Patient reports that today she disconnected the insulin pump to take a shower then was not able to re-connect it. Even the nurse at the assisted living tried to help but was not able to connect it as well.   her fingerstick glucose was >500 so she presented to the ED     Additionally, patient reports that 10 days ago she had a viral illness with fever and cough and was treated symptomatically; but she's still having a productive cough (greenish) without fever or other upper resp symptoms.     Patient denies chest pain, dizziness, nausea, vomiting, abdominal pain, dysuria, or additional concerns.        in the ED   Vitals   · BP Systolic	128 mm Hg  · BP Diastolic	80 mm Hg  · Heart Rate	96 /min  · Respiration Rate (breaths/min)	18 /min  · Temp (C)	36.4 Degrees C  · Temp site	oral  · SpO2 (%)	98 % room air      CXR: No radiographic evidence of acute cardiopulmonary disease.    WBC 13K  Hgb 10.6 (baseline 11.7)    Plt 519  Na 133  K+ 5.3  Bicarb 24   Gap 13   Creat 0.6  lactate 1.6  pH 7.38 (VBG)    Urine: no ketones / gravity >1030 // Glucose > 1000  // negative for infection   COVID/ FLU / RSV: negative    ECG: Nl sinus    Received 2 L LR and IV insulin in the ED                       #Poorly controlled type 1 DM (on insulin pump Medtronic 630 G)   #Hyperglycemia 2/2 to insulin pump malfunction   #DKA and HHS ruled out  #Diabetic neuropathy  -4/1/24 A1c 121%   - at home on insulin pump   - non compliant with taking boluses     - pregnancy test negative  - Urine: no ketones / gravity >1030 // Glucose > 1000  // negative for infection   - on presentation glucose: in 400 s received 8u IV insulin but also dexamethasone IV for the wheezing -> glucose became > 600 -> received 10 units IV regular insulin but ate and drank juice -> glucose still > 600 -> additional 10u IV push given at 9 pm + 15 u SQ and lantus 30 u.    - f up BMP (for K+)   - f up serum osm   - f up procal    - pattern of hyperglycemia pre and post meals , attributed to noncompliance with diet , per mother and Ania , she  was not watching her carbs and eating snack also not always taking boluses with snacks ,has been in and out of   hospital with changes to her psych meds   changed rates and I: C as below   - monitor lipids , lft   - following with podiatry    Diabetes Therapy Regimen   Basal Rate  Start Time: 12 am - Basal: 2.75 units/hour   Start Time: 12pm----4 pm Basal: 3.5u/hr   Start TIme 4 pm- 12am BAsal 3.75 u/hr     Carb Ratios  Start Time: 12am Carb Ratios: 8 grams/unit  Start Time: 7 am Carb Ratios: 6 grams/unit..  Start Time: 2pm Carb Ratios: 6 grams/unit....1:5 g   Sensitvity  Start Time: Sensitivity: 30 mg/dL/U   BG Target Ranges  Start Time: BG Target Ranges: 120 mg/dL      On Monday the pump delivered a total of: 105 u   On Tuesday the pump delivered a total of: 115 u   On Wednesday  the pump delivered a total of: 105 u     -> her basal is 77 units  - C/w Lantus 41 units at bedtime + Admelog standing 12 u  pre meals -> to cover the 77 basal   - ISS -> to cover the rest   - CC diet   - IV hydration   - endo consult       #Asthma exacerbation likely secondary to viral illness   - wheezing on presentation  - received dexa IV and duoneb in the ED  - lactate 1.6 // pH 7.38 (VBG) // WBC 13K  - COVID/ FLU / RSV: negative  - CXR: No radiographic evidence of acute cardiopulmonary disease.  - afebrile // on room air // no PNA on CXR   - c/w duoneb standing for 24 hours  - c/w duoneb PRN    #HTN   - c/w norvasc and metoprolol    #Hx of anxiety/depression  #Hx of suicidal attempts (5-6 times)  # Pseudoseizures? (noted in prior notes)  - 5 or 6 prior lifetime psychiatric admissions for suicide attempts  (once by taking a capful of acetone, another by drinking hydrogen peroxide, another attempt by taking half a bottle of ibuprofen)  - following with psychiatry  - no active suicidal or homicidal ideation    #chronic back pain  #Bilateral foraminal stenosis  - MRI in 2024 -> L5-S1: Disc bulge, central disc herniation and a left central inferiorly migrating disc extrusion vs sequestration (new since 2018). Prominent epidural fat contributes to overall moderate spinal canal and bilateral foraminal stenosis, increased since 2018.    #Migraine  - stable     #Prior findings  - in July 2024: Too small to characterize hypodense lesions in the right hepatic lobe up to 1.5 cm.   - OP f up     #Obesity   - OP f up     #Diet: CC diet  #DVT pro: lovenox  #GI pro: pantoprazole  #Activity: as tolerated  #Dispo: med   #Med rec:   #Code status: full   #Domiciled at [facility]                  39 y.o F presenting for hyperglycemia after the insulin pump was disconnected    #Poorly controlled type 1 DM (on insulin pump Medtronic 630 G)   #Hyperglycemia 2/2 to insulin pump malfunction   #DKA and HHS ruled out  #Diabetic neuropathy  -4/1/24 A1c 121%   - at home on insulin pump   - non compliant with taking boluses     - pregnancy test negative  - Urine: no ketones / gravity >1030 // Glucose > 1000  // negative for infection   - on presentation glucose: in 400 s received 8u IV insulin but also dexamethasone IV for the wheezing -> glucose became > 600 -> received 10 units IV regular insulin but ate and drank juice -> glucose still > 600 -> additional 10u IV push given at 9 pm + 15 u SQ and lantus 30 u.    - f up BMP (for K+)   - f up serum osm   - f up procal    Baseline Diabetes Therapy Regimen   Basal Rate  Start Time: 12 am - Basal: 2.75 units/hour   Start Time: 12pm----4 pm Basal: 3.5u/hr   Start TIme 4 pm- 12am Basal 3.75 u/hr     Carb Ratios  Start Time: 12am Carb Ratios: 8 grams/unit  Start Time: 7 am Carb Ratios: 6 grams/unit..  Start Time: 2pm Carb Ratios: 6 grams/unit....1:5 g     Sensitvity  Start Time: Sensitivity: 30 mg/dL/U   BG Target Ranges  Start Time: BG Target Ranges: 120 mg/dL      On Monday the pump delivered a total of: 105 u   On Tuesday the pump delivered a total of: 115 u   On Wednesday  the pump delivered a total of: 105 u     So, her basal is 77 units and usually requires around 30 extra units per day     - C/w Lantus 41 units at bedtime + Admelog standing 12 u  pre meals -> to cover the 77 u basal   - ISS -> to cover the rest   - CC diet   - IV hydration   - endo consult       #Asthma exacerbation likely secondary to viral illness   #Possible bronchitis   - wheezing on presentation; but no wheezing on my exam (received dexa IV and duonebs)  - lactate 1.6 // pH 7.38 (VBG) // WBC 13K  - COVID/ FLU / RSV: negative  - CXR: No radiographic evidence of acute cardiopulmonary disease.  - afebrile // on room air // no PNA on CXR     - c/w duoneb standing for 24 hours  - c/w duoneb PRN  - keep of steroids given the uncontrolled hyperglycemia and no wheezing anymore on exam   - azithro for 3 days (green sputum)  - f up sputum cx and gram stain  - on discharge might benefit of standing therapy       #HTN   - c/w norvasc and metoprolol    #Hx of anxiety/depression  #Hx of suicidal attempts (5-6 times)  # Pseudoseizures? (noted in prior notes)  - 5 or 6 prior lifetime psychiatric admissions for suicide attempts  (once by taking a capful of acetone, another by drinking hydrogen peroxide, another attempt by taking half a bottle of ibuprofen)  - following with psychiatry  - no active suicidal or homicidal ideation    #chronic back pain  #Bilateral foraminal stenosis  - MRI in 2024 -> L5-S1: Disc bulge, central disc herniation and a left central inferiorly migrating disc extrusion vs sequestration (new since 2018). Prominent epidural fat contributes to overall moderate spinal canal and bilateral foraminal stenosis, increased since 2018.    #Migraine  - stable     #Prior findings  - in July 2024: Too small to characterize hypodense lesions in the right hepatic lobe up to 1.5 cm.   - OP f up     #Obesity   - OP f up     #Diet: CC diet  #DVT pro: lovenox  #GI pro: pantoprazole  #Activity: as tolerated  #Dispo: med   #Med rec: done  #Code status: full   #Domiciled at [facility]

## 2024-11-07 NOTE — PATIENT PROFILE ADULT - FUNCTIONAL ASSESSMENT - BASIC MOBILITY 6.
4-calculated by average/Not able to assess (calculate score using [facility] averaging method)

## 2024-11-07 NOTE — ED ADULT TRIAGE NOTE - BEFAST SCREENING
Upon assessment, pt c/o increased chest pain - referred to it as tremendous chest pressure in the epigastric area, front & back - SOB and nausea. Pt experienced chest pain overnight, given sublingual nitro. BP elevated. Pt states her BP meds were being held because of decreasing kidney function. Perfect Serve sent to Dr. Aristides Tovar, new orders received immediately. EKG obtained and given to MD. IV hydralazine given per orders. Cardiology consulted. Pt transferred to ICU, bedside report given to [facility].      Electronically signed by Mick Aguirre RN on 10/28/23 at 6:09 PM EDT Negative

## 2024-11-07 NOTE — ED ADULT NURSE REASSESSMENT NOTE - NS ED NURSE REASSESS COMMENT FT1
Pt was medicated as ordered & seen at bedside by Dr. Min Tracy. Report was given pt left ED in stable condition with no complaints. FS was reassessed and trending down.

## 2024-11-07 NOTE — ED ADULT NURSE NOTE - NSFALLUNIVINTERV_ED_ALL_ED
Bed/Stretcher in lowest position, wheels locked, appropriate side rails in place/Call bell, personal items and telephone in reach/Instruct patient to call for assistance before getting out of bed/chair/stretcher/Non-slip footwear applied when patient is off stretcher/[place] to call system/Physically safe environment - no spills, clutter or unnecessary equipment/Purposeful proactive rounding/Room/bathroom lighting operational, light cord in reach

## 2024-11-07 NOTE — ED ADULT NURSE REASSESSMENT NOTE - NS ED NURSE REASSESS COMMENT FT1
Attempted to call MD @ [phone] multiples times over last hour, Spoke with Dr. Thompson @ [phone]. as per MD will reassess FS & medicate patient as ordered. pt VSS and has no complaints at this time.

## 2024-11-07 NOTE — ED PROVIDER NOTE - CARE PLAN
Principal Discharge DX:	Hyperglycemia   1 Principal Discharge DX:	Hyperglycemia  Assessment and plan of treatment:	Plan - labs cxr ua reassess

## 2024-11-07 NOTE — ED PROVIDER NOTE - CLINICAL SUMMARY MEDICAL DECISION MAKING FREE TEXT BOX
40 yo F presented to ED w/ hyperglycemia and URI sx. Labs and EKG were ordered and reviewed. EKG Non-ischemic. Labs not consistent with DKA.  Imaging was ordered and reviewed by me - no signs of pneumonia, however with one week of sx will treat with abx. Appropriate medications for patient's presenting complaints were ordered and effects were reassessed.  Patient's records (prior hospital, ED visit, and/or nursing home notes if available) were reviewed.  Additional history was obtained from EMS, family, and/or PCP (where available).  Escalation to admission/observation was considered.  Patient requires inpatient hospitalization for further care.

## 2024-11-08 ENCOUNTER — TRANSCRIPTION ENCOUNTER (OUTPATIENT)
Age: 39
End: 2024-11-08

## 2024-11-08 LAB
A1C WITH ESTIMATED AVERAGE GLUCOSE RESULT: 10 % — HIGH (ref 4–5.6)
ALBUMIN SERPL ELPH-MCNC: 4 G/DL — SIGNIFICANT CHANGE UP (ref 3.5–5.2)
ALP SERPL-CCNC: 160 U/L — HIGH (ref 30–115)
ALT FLD-CCNC: 53 U/L — HIGH (ref 0–41)
ANION GAP SERPL CALC-SCNC: 14 MMOL/L — SIGNIFICANT CHANGE UP (ref 7–14)
ANION GAP SERPL CALC-SCNC: 14 MMOL/L — SIGNIFICANT CHANGE UP (ref 7–14)
AST SERPL-CCNC: 21 U/L — SIGNIFICANT CHANGE UP (ref 0–41)
BASOPHILS # BLD AUTO: 0.02 K/UL — SIGNIFICANT CHANGE UP (ref 0–0.2)
BASOPHILS NFR BLD AUTO: 0.1 % — SIGNIFICANT CHANGE UP (ref 0–1)
BILIRUB SERPL-MCNC: 0.3 MG/DL — SIGNIFICANT CHANGE UP (ref 0.2–1.2)
BUN SERPL-MCNC: 12 MG/DL — SIGNIFICANT CHANGE UP (ref 10–20)
BUN SERPL-MCNC: 15 MG/DL — SIGNIFICANT CHANGE UP (ref 10–20)
CALCIUM SERPL-MCNC: 9.4 MG/DL — SIGNIFICANT CHANGE UP (ref 8.4–10.5)
CALCIUM SERPL-MCNC: 9.5 MG/DL — SIGNIFICANT CHANGE UP (ref 8.4–10.5)
CHLORIDE SERPL-SCNC: 102 MMOL/L — SIGNIFICANT CHANGE UP (ref 98–110)
CHLORIDE SERPL-SCNC: 98 MMOL/L — SIGNIFICANT CHANGE UP (ref 98–110)
CO2 SERPL-SCNC: 22 MMOL/L — SIGNIFICANT CHANGE UP (ref 17–32)
CO2 SERPL-SCNC: 24 MMOL/L — SIGNIFICANT CHANGE UP (ref 17–32)
CREAT SERPL-MCNC: 0.5 MG/DL — LOW (ref 0.7–1.5)
CREAT SERPL-MCNC: 0.6 MG/DL — LOW (ref 0.7–1.5)
EGFR: 117 ML/MIN/1.73M2 — SIGNIFICANT CHANGE UP
EGFR: 122 ML/MIN/1.73M2 — SIGNIFICANT CHANGE UP
EOSINOPHIL # BLD AUTO: 0.01 K/UL — SIGNIFICANT CHANGE UP (ref 0–0.7)
EOSINOPHIL NFR BLD AUTO: 0.1 % — SIGNIFICANT CHANGE UP (ref 0–8)
ESTIMATED AVERAGE GLUCOSE: 240 MG/DL — HIGH (ref 68–114)
GLUCOSE BLDC GLUCOMTR-MCNC: 227 MG/DL — HIGH (ref 70–99)
GLUCOSE BLDC GLUCOMTR-MCNC: 240 MG/DL — HIGH (ref 70–99)
GLUCOSE BLDC GLUCOMTR-MCNC: 275 MG/DL — HIGH (ref 70–99)
GLUCOSE BLDC GLUCOMTR-MCNC: 299 MG/DL — HIGH (ref 70–99)
GLUCOSE BLDC GLUCOMTR-MCNC: 359 MG/DL — HIGH (ref 70–99)
GLUCOSE BLDC GLUCOMTR-MCNC: 379 MG/DL — HIGH (ref 70–99)
GLUCOSE SERPL-MCNC: 313 MG/DL — HIGH (ref 70–99)
GLUCOSE SERPL-MCNC: 410 MG/DL — HIGH (ref 70–99)
HCT VFR BLD CALC: 34.6 % — LOW (ref 37–47)
HGB BLD-MCNC: 10.4 G/DL — LOW (ref 12–16)
IMM GRANULOCYTES NFR BLD AUTO: 0.9 % — HIGH (ref 0.1–0.3)
LYMPHOCYTES # BLD AUTO: 13.5 % — LOW (ref 20.5–51.1)
LYMPHOCYTES # BLD AUTO: 2.01 K/UL — SIGNIFICANT CHANGE UP (ref 1.2–3.4)
MAGNESIUM SERPL-MCNC: 2.2 MG/DL — SIGNIFICANT CHANGE UP (ref 1.8–2.4)
MCHC RBC-ENTMCNC: 23.7 PG — LOW (ref 27–31)
MCHC RBC-ENTMCNC: 30.1 G/DL — LOW (ref 32–37)
MCV RBC AUTO: 78.8 FL — LOW (ref 81–99)
MONOCYTES # BLD AUTO: 0.74 K/UL — HIGH (ref 0.1–0.6)
MONOCYTES NFR BLD AUTO: 5 % — SIGNIFICANT CHANGE UP (ref 1.7–9.3)
NEUTROPHILS # BLD AUTO: 12 K/UL — HIGH (ref 1.4–6.5)
NEUTROPHILS NFR BLD AUTO: 80.4 % — HIGH (ref 42.2–75.2)
NRBC # BLD: 0 /100 WBCS — SIGNIFICANT CHANGE UP (ref 0–0)
OSMOLALITY SERPL: 290 MOS/KG — SIGNIFICANT CHANGE UP (ref 275–300)
PHOSPHATE SERPL-MCNC: 4.3 MG/DL — SIGNIFICANT CHANGE UP (ref 2.1–4.9)
PLATELET # BLD AUTO: 567 K/UL — HIGH (ref 130–400)
PMV BLD: 10.6 FL — HIGH (ref 7.4–10.4)
POTASSIUM SERPL-MCNC: 4.9 MMOL/L — SIGNIFICANT CHANGE UP (ref 3.5–5)
POTASSIUM SERPL-MCNC: 5.4 MMOL/L — HIGH (ref 3.5–5)
POTASSIUM SERPL-SCNC: 4.9 MMOL/L — SIGNIFICANT CHANGE UP (ref 3.5–5)
POTASSIUM SERPL-SCNC: 5.4 MMOL/L — HIGH (ref 3.5–5)
PROCALCITONIN SERPL-MCNC: 0.18 NG/ML — HIGH (ref 0.02–0.1)
PROT SERPL-MCNC: 7.2 G/DL — SIGNIFICANT CHANGE UP (ref 6–8)
RBC # BLD: 4.39 M/UL — SIGNIFICANT CHANGE UP (ref 4.2–5.4)
RBC # FLD: 16 % — HIGH (ref 11.5–14.5)
SODIUM SERPL-SCNC: 134 MMOL/L — LOW (ref 135–146)
SODIUM SERPL-SCNC: 140 MMOL/L — SIGNIFICANT CHANGE UP (ref 135–146)
WBC # BLD: 14.91 K/UL — HIGH (ref 4.8–10.8)
WBC # FLD AUTO: 14.91 K/UL — HIGH (ref 4.8–10.8)

## 2024-11-08 PROCEDURE — 99232 SBSQ HOSP IP/OBS MODERATE 35: CPT

## 2024-11-08 RX ORDER — SODIUM ZIRCONIUM CYCLOSILICATE 10 G/10G
5 POWDER, FOR SUSPENSION ORAL ONCE
Refills: 0 | Status: COMPLETED | OUTPATIENT
Start: 2024-11-08 | End: 2024-11-08

## 2024-11-08 RX ORDER — GUAIFENESIN 100 MG/5ML
200 LIQUID ORAL EVERY 6 HOURS
Refills: 0 | Status: DISCONTINUED | OUTPATIENT
Start: 2024-11-08 | End: 2024-11-13

## 2024-11-08 RX ORDER — INSULIN LISPRO 100/ML
10 VIAL (ML) SUBCUTANEOUS ONCE
Refills: 0 | Status: COMPLETED | OUTPATIENT
Start: 2024-11-08 | End: 2024-11-08

## 2024-11-08 RX ORDER — GLUCAGON INJECTION, SOLUTION 1 MG/.2ML
1 INJECTION, SOLUTION SUBCUTANEOUS
Qty: 15 | Refills: 0
Start: 2024-11-08

## 2024-11-08 RX ORDER — INSULIN GLARGINE,HUM.REC.ANLOG 100/ML
40 VIAL (ML) SUBCUTANEOUS ONCE
Refills: 0 | Status: COMPLETED | OUTPATIENT
Start: 2024-11-08 | End: 2024-11-08

## 2024-11-08 RX ORDER — GLUCAGON INJECTION, SOLUTION 1 MG/.2ML
1 INJECTION, SOLUTION SUBCUTANEOUS
Qty: 1 | Refills: 0
Start: 2024-11-08 | End: 2024-11-17

## 2024-11-08 RX ORDER — BENZOCAINE .06; .7 ML/1; ML/1
0 SWAB TOPICAL
Qty: 100 | Refills: 1
Start: 2024-11-08

## 2024-11-08 RX ORDER — OMEPRAZOLE 10 MG
1 CAPSULE,DELAYED RELEASE (ENTERIC COATED) ORAL
Refills: 0 | DISCHARGE

## 2024-11-08 RX ORDER — CHLORHEXIDINE GLUCONATE 40 MG/ML
1 SOLUTION TOPICAL
Refills: 0 | Status: DISCONTINUED | OUTPATIENT
Start: 2024-11-08 | End: 2024-11-13

## 2024-11-08 RX ORDER — INSULIN LISPRO 100/ML
14 VIAL (ML) SUBCUTANEOUS
Qty: 13 | Refills: 0
Start: 2024-11-08 | End: 2024-12-07

## 2024-11-08 RX ORDER — BENZOCAINE .06; .7 ML/1; ML/1
0 SWAB TOPICAL
Qty: 120 | Refills: 1
Start: 2024-11-08

## 2024-11-08 RX ORDER — GLUCAGON INJECTION, SOLUTION 1 MG/.2ML
0 INJECTION, SOLUTION SUBCUTANEOUS
Qty: 1 | Refills: 0
Start: 2024-11-08

## 2024-11-08 RX ORDER — SODIUM CHLORIDE 9 MG/ML
1000 INJECTION, SOLUTION INTRAMUSCULAR; INTRAVENOUS; SUBCUTANEOUS
Refills: 0 | Status: DISCONTINUED | OUTPATIENT
Start: 2024-11-08 | End: 2024-11-13

## 2024-11-08 RX ADMIN — Medication 25 MILLIGRAM(S): at 22:24

## 2024-11-08 RX ADMIN — SODIUM ZIRCONIUM CYCLOSILICATE 5 GRAM(S): 10 POWDER, FOR SUSPENSION ORAL at 17:34

## 2024-11-08 RX ADMIN — PANTOPRAZOLE SODIUM 40 MILLIGRAM(S): 40 TABLET, DELAYED RELEASE ORAL at 06:06

## 2024-11-08 RX ADMIN — Medication 10 UNIT(S): at 06:23

## 2024-11-08 RX ADMIN — SODIUM CHLORIDE 100 MILLILITER(S): 9 INJECTION, SOLUTION INTRAMUSCULAR; INTRAVENOUS; SUBCUTANEOUS at 00:49

## 2024-11-08 RX ADMIN — Medication 5 MILLIGRAM(S): at 06:06

## 2024-11-08 RX ADMIN — FAMOTIDINE 40 MILLIGRAM(S): 10 INJECTION INTRAVENOUS at 12:08

## 2024-11-08 RX ADMIN — Medication 4: at 08:50

## 2024-11-08 RX ADMIN — SERTRALINE HYDROCHLORIDE 25 MILLIGRAM(S): 50 TABLET, FILM COATED ORAL at 12:08

## 2024-11-08 RX ADMIN — Medication 12 UNIT(S): at 12:07

## 2024-11-08 RX ADMIN — Medication 25 MILLIGRAM(S): at 17:42

## 2024-11-08 RX ADMIN — GUAIFENESIN 200 MILLIGRAM(S): 100 LIQUID ORAL at 06:06

## 2024-11-08 RX ADMIN — Medication 10: at 17:33

## 2024-11-08 RX ADMIN — Medication 12 UNIT(S): at 08:49

## 2024-11-08 RX ADMIN — IPRATROPIUM BROMIDE AND ALBUTEROL SULFATE 3 MILLILITER(S): .5; 2.5 SOLUTION RESPIRATORY (INHALATION) at 07:54

## 2024-11-08 RX ADMIN — Medication 12 UNIT(S): at 17:33

## 2024-11-08 RX ADMIN — Medication 40 MILLIGRAM(S): at 23:44

## 2024-11-08 RX ADMIN — Medication 6: at 12:08

## 2024-11-08 RX ADMIN — IPRATROPIUM BROMIDE AND ALBUTEROL SULFATE 3 MILLILITER(S): .5; 2.5 SOLUTION RESPIRATORY (INHALATION) at 19:55

## 2024-11-08 RX ADMIN — Medication 25 MILLIGRAM(S): at 06:06

## 2024-11-08 RX ADMIN — AZITHROMYCIN DIHYDRATE 255 MILLIGRAM(S): 200 POWDER, FOR SUSPENSION ORAL at 23:41

## 2024-11-08 RX ADMIN — Medication 40 UNIT(S): at 17:32

## 2024-11-08 NOTE — DISCHARGE NOTE PROVIDER - NSDCFUADDINST_GEN_ALL_CORE_FT
Please follow up with your endocrinologist Dr. Elizabeth Galvez for management of your insulin pump within 1 week. You can schedule your appointment by calling [phone] and she is office is at [address] [place] [address], [address]. Please follow up your Gynecologist Dr. Vasquez Mueller within one week for your vaginal itching and pain. You can schedule your appointment by calling [phone] and he is located at [address] [place] [address]. Please follow up with your primary care provider for your regular health check up.

## 2024-11-08 NOTE — DISCHARGE NOTE PROVIDER - NSDCCPCAREPLAN_GEN_ALL_CORE_FT
PRINCIPAL DISCHARGE DIAGNOSIS  Diagnosis: Hyperglycemia  Assessment and Plan of Treatment: Please take Insulin Lantus 40 units daily at 5PM, and Insulin lispro 14 units with each meal. Continue with insulin injections until fixing the insulin pump. come back to the hospital at any time for any reason or complaints.  Diabetes is a chronic condition caused by high blood sugar levels. Your blood sugar levels become high because your body does not have enough insulin. Insulin helps move sugar out of the blood so it can be used for energy. Increased sugar in your blood can cause problems in several organs of your body including but not limited to your blood vessels, your kidneys, your brain, and your eyes. The lack of insulin forces your body to use fat instead of sugar for energy. This can be dangerous if not controlled.  Seek Medical Attention If:  You have a seizure.  You begin to breathe fast, or are short of breath.  You become weak and confused.  You are more drowsy than usual.  You have fruity, sweet breath.  You have severe, new stomach pain and are vomiting.  Your blood sugar level is lower or higher than your healthcare provider says it should be.  You have ketones in your blood or urine.  You have a fever or chills.  You are more thirsty than usual.  You are urinating more often than usual.  You have questions or concerns about your condition or care.  Insulin and diabetes medicine decreases the amount of sugar in your blood.  The best way to prevent problems from Diabetes is to control your blood sugars.  Monitor your blood sugar levels closely. Administer Insulin as directed by your physician.   Speak with your doctor and/or a nutritionist about the best way to change your lifestyle and dietary habits to avoid any problems from Diabetes in the future.       PRINCIPAL DISCHARGE DIAGNOSIS  Diagnosis: Hyperglycemia  Assessment and Plan of Treatment: Ms. Mie, you came to the hospital due to high blood sugar as your pump was not working. You were given insulin injections during your admission and your pump was resumed. You were also seen by our gynecology team for your vaginal pain and itching. Please continue to use lotrizole cream daily and take diflucan 1 tablet one dose on 11/13 and one dose on 11/16. You can schedule your appointment by calling [phone] and she is office is at [address] [place] [address], [address]. Please follow up with your endocrinologist Dr. Elizabeth Galvez for management of your insulin pump within 1 week. Please follow up your Gynecologist Dr. Vasquez Mueller within one week for your vaginal itching and pain. You can schedule your appointment by calling [phone] and he is located at [address] [place] [address]. Please follow up with your primary care provider for your regular health check up.     PRINCIPAL DISCHARGE DIAGNOSIS  Diagnosis: Hyperglycemia  Assessment and Plan of Treatment: Ms. Mei, you came to the hospital due to high blood sugar as your pump was not working. You were given insulin injections during your admission and your pump was resumed. Please follow up with your endocrinologist Dr. Elizabeth Galvez for management of your insulin pump within 1 week.  You can schedule your appointment by calling [phone] and she is office is at [address] [place] [address], [address]. You were also seen by our gynecology team for your vaginal pain and itching. Please use terazol applicator 7 for 7 days once a day for your internal genitalia. Please apply lotrisone cream twice daily for external genitalia. Please follow up your Gynecologist Dr. Vasquez Mueller within one week for your vaginal itching and pain. You can schedule your appointment by calling [phone] and he is located at [address] [place] [address]. Please follow up with your primary care provider for your regular health check up.

## 2024-11-08 NOTE — DISCHARGE NOTE PROVIDER - CARE PROVIDER_API CALL
Martha Robertson  Internal Medicine  [address] [place] [address]  Phone: [phone]  Fax: [phone]  Follow Up Time: 1 week   Martha Robertson  Internal Medicine  [address] [place] [address]  Phone: [phone]  Fax: [phone]  Follow Up Time: 1 week    Igor Nunez  Endocrinology/Metab/Diabetes  [address] [place] [address]  Phone: [phone]  Fax: [phone]  Follow Up Time: 1 week   Martha Robertson  Internal Medicine  [address] [place] [address]  Phone: [phone]  Fax: [phone]  Follow Up Time: 1 week    Elizabeth Galvez  Endocrinology/Metab/Diabetes  [address] [place] [address], [address]  [place] [address]  Phone: [phone]  Fax: [phone]  Follow Up Time: 1 week    Vasquez Mueller  Obstetrics and Gynecology  [address] [place] [address]  Phone: [phone]  Fax: [phone]  Follow Up Time: 2 weeks

## 2024-11-08 NOTE — CONSULT NOTE ADULT - ATTENDING COMMENTS
Type 1 DM-on insulin pump-admitted with hyperglycemia after either pump failure or clogged tubing.  Recommend discharge on Basaglar Kwikpen 40 units q24hr and Lispro Kwikpen 14 units tid ac until pump therapy reestablished.

## 2024-11-08 NOTE — DISCHARGE NOTE PROVIDER - NSDCMRMEDTOKEN_GEN_ALL_CORE_FT
alcohol swabs: Apply topically to affected area 4 times a day  azelastine nasal: 2 spray(s) 2 times a day  Dexcom G7 : Use as directed  famotidine 40 mg oral tablet: 1 tab(s) orally once a day  Freestyle Precision Carlos Strips: Test blood sugar four times a day when you see check blood glucose symbol or when symptoms don&#x27;t match Anita reading.  Glucagon Emergency Kit for Low Blood Sugar 1 mg injection: 1 milligram(s) intramuscularly  glucometer (per patient&#x27;s insurance): Test blood sugars four times a day. Dispense #1 glucometer.  insulin glargine 100 units/mL subcutaneous solution: 40 unit(s) subcutaneous once a day (in the evening) at 5pm  insulin lispro 100 units/mL injectable solution: 14 unit(s) injectable 3 times a day (before meals)  Insulin Pen Needles, 4mm: 1 application subcutaneously 4 times a day. ** Use with insulin pen **  lancets: 1 application subcutaneously 4 times a day  metoprolol tartrate 25 mg oral tablet: 1 tab(s) orally 3 times a day  Norvasc 5 mg oral tablet: 1 tab(s) orally once a day  rimegepant 75 mg oral tablet, disintegratin tab(s) orally once a day as needed for migraine  sertraline 25 mg oral tablet: 1 tab(s) orally once a day  test strips (per patient&#x27;s insurance): 1 application subcutaneously 4 times a day. ** Compatible with patient&#x27;s glucometer **   alcohol swabs: Apply topically to affected area 4 times a day  azelastine nasal: 2 spray(s) 2 times a day  clotrimazole 2% vaginal cream with applicator: 1 applicatorful vaginal once a day (at bedtime)  Dexcom G7 : Use as directed.  famotidine 40 mg oral tablet: 1 tab(s) orally once a day  Freestyle Precision Carlos Strips: Test blood sugar four times a day when you see check blood glucose symbol or when symptoms don&#x27;t match Anita reading.  Glucagon Emergency Kit for Low Blood Sugar 1 mg injection: 1 milligram(s) intramuscularly 3 to 4 times a day as needed for  hypoglycemia  glucometer (per patient&#x27;s insurance): Test blood sugars four times a day. Dispense #1 glucometer.  Insulin Pen Needles, 4mm: 1 application subcutaneously 4 times a day. ** Use with insulin pen **  lancets: 1 application subcutaneously 4 times a day  metoprolol tartrate 25 mg oral tablet: 1 tab(s) orally 3 times a day  Norvasc 5 mg oral tablet: 1 tab(s) orally once a day  rimegepant 75 mg oral tablet, disintegratin tab(s) orally once a day as needed for migraine  sertraline 25 mg oral tablet: 1 tab(s) orally once a day  test strips (per patient&#x27;s insurance): 1 application subcutaneously 4 times a day. ** Compatible with patient&#x27;s glucometer **   alcohol swabs: Apply topically to affected area 4 times a day  azelastine nasal: 2 spray(s) 2 times a day  clotrimazole-betamethasone dipropionate 1%-0.05% topical cream: Apply topically to affected area 2 times a day  Dexcom G7 : Use as directed.  famotidine 40 mg oral tablet: 1 tab(s) orally once a day  Freestyle Precision Carlos Strips: Test blood sugar four times a day when you see check blood glucose symbol or when symptoms don&#x27;t match Anita reading.  Glucagon Emergency Kit for Low Blood Sugar 1 mg injection: 1 milligram(s) intramuscularly 3 to 4 times a day as needed for  hypoglycemia  glucometer (per patient&#x27;s insurance): Test blood sugars four times a day. Dispense #1 glucometer.  insulin glargine 100 units/mL subcutaneous solution: 60 unit(s) subcutaneous once a day (in the evening) at 5pm  insulin lispro 100 units/mL injectable solution: 18 unit(s) injectable  Insulin Pen Needles, 4mm: 1 application subcutaneously 4 times a day. ** Use with insulin pen **  lancets: 1 application subcutaneously 4 times a day  metoprolol tartrate 25 mg oral tablet: 1 tab(s) orally 3 times a day  Norvasc 5 mg oral tablet: 1 tab(s) orally once a day  rimegepant 75 mg oral tablet, disintegratin tab(s) orally once a day as needed for migraine  sertraline 25 mg oral tablet: 1 tab(s) orally once a day  terconazole 0.4% vaginal cream: 1 applicatorful intravaginally once a day  test strips (per patient&#x27;s insurance): 1 application subcutaneously 4 times a day. ** Compatible with patient&#x27;s glucometer **   azelastine nasal: 2 spray(s) 2 times a day  Basaglar KwikPen 100 units/mL subcutaneous solution: 60 subcutaneous once a day  clotrimazole-betamethasone dipropionate 1%-0.05% topical cream: Apply topically to affected area 2 times a day  famotidine 40 mg oral tablet: 1 tab(s) orally once a day  insulin lispro 100 units/mL injectable solution: 18 unit(s) injectable 3 times a day (before meals)  metoprolol tartrate 25 mg oral tablet: 1 tab(s) orally 3 times a day  Norvasc 5 mg oral tablet: 1 tab(s) orally once a day  rimegepant 75 mg oral tablet, disintegratin tab(s) orally once a day as needed for migraine  sertraline 25 mg oral tablet: 1 tab(s) orally once a day  terconazole 0.4% vaginal cream: 1 applicatorful intravaginally once a day

## 2024-11-08 NOTE — CONSULT NOTE ADULT - ASSESSMENT
39 y.o F presenting for hyperglycemia after the insulin pump was disconnected and she was unable to reconnect it.   Endocrinology consulted for DM management     At home glucose measures 200s-300s, not well controlled. In hospital on lantus 41U + lispro 12U TID     Basal Rate  Start Time: 12 am - Basal: 2.75 units/hour   Start Time: 12pm----4 pm Basal: 3.5u/hr   Start TIme 4 pm- 12am Basal 3.75 u/hr     Carb Ratios  Start Time: 12am Carb Ratios: 8 grams/unit  Start Time: 7 am Carb Ratios: 6 grams/unit..  Start Time: 2pm Carb Ratios: 6 grams/unit....1:5 g     Sensitvity  Sensitivity: 30 mg/dL/U     #Poorly controlled type 1 DM (on insulin pump Medtronic 630 G)   #Hyperglycemia 2/2 to insulin pump malfunction   - no DKA  - Last Hba1c 4/1/24: 12.1%   -    39 y.o F presenting for hyperglycemia after the insulin pump was disconnected and she was unable to reconnect it.   Endocrinology consulted for DM management     At home glucose measures 200s-300s, not well controlled. In hospital on lantus 41U + lispro 12U TID     Basal Rate  Start Time: 12 am - Basal: 2.75 units/hour   Start Time: 12pm----4 pm Basal: 3.5u/hr   Start TIme 4 pm- 12am Basal 3.75 u/hr     Carb Ratios  Start Time: 12am Carb Ratios: 8 grams/unit  Start Time: 7 am Carb Ratios: 6 grams/unit..  Start Time: 2pm Carb Ratios: 6 grams/unit....1:5 g     Sensitvity  Sensitivity: 30 mg/dL/U     #Poorly controlled type 1 DM (on insulin pump Medtronic 630 G)   #Hyperglycemia 2/2 to insulin pump malfunction   - Last Hba1c 4/1/24: 12.1%   - D/C on insulin pens intermittently until insulin pump machine is fixed, patient will contact the company   - send on basiglar 40U everyday at 5pm and lispro 14U TID premeals   - send glucagon to pharmacy (in case of hypoglycemia  0  - please give tonight's lantus dose before patient leaves

## 2024-11-08 NOTE — PROGRESS NOTE ADULT - SUBJECTIVE AND OBJECTIVE BOX
CRISTI MONTGOMERY  39y, Female  Allergy: amoxicillin (Hives)  Fluad 0.5 ML ODETTE (Unknown)  Ceclor (Rash)  Clindamycin Phosphate (Unknown)  penicillins (Hives)  gabapentin (Other)  Influenza Virus Vaccine, H5N1, Inactivated (Other)  Cipro (Other)  clindamycin (Hives; Nephrotoxicity)    Hospital Day: 1d    Patient seen and examined earlier today.  No acute events overnight.     PMH/PSH:  PAST MEDICAL & SURGICAL HISTORY:  Neuropathy  right lower extremity, vaginal      Type 1 diabetes      Degenerative disc disease, thoracic      Urinary tract infection, recurrent      Gastroesophageal reflux disease, esophagitis presence not specified      Intractable headache, unspecified chronicity pattern, unspecified headache type      Major depressive disorder with single episode, in full remission  previously treated with zyprexa, celexa and lexapro      Tremor of right hand      Tachycardia      Spinal stenosis      No significant past surgical history          LAST 24-Hr EVENTS:    VITALS:  T(F): 98 (11-08-24 @ 12:32), Max: 98 (11-07-24 @ 16:59)  HR: 98 (11-08-24 @ 12:32)  BP: 92/60 (11-08-24 @ 12:32) (92/60 - 143/80)  RR: 18 (11-08-24 @ 12:32)  SpO2: 98% (11-08-24 @ 05:50)          TESTS & MEASUREMENTS:  Weight/BMI  87.6 (11-07-24 @ 22:45)  33.1 (11-07-24 @ 22:45)                          10.4   14.91 )-----------( 567      ( 08 Nov 2024 06:35 )             34.6         11-08    140  |  102  |  12  ----------------------------<  313[H]  5.4[H]   |  24  |  0.5[L]    Ca    9.5      08 Nov 2024 06:35  Phos  4.3     11-08  Mg     2.2     11-08    TPro  7.2  /  Alb  4.0  /  TBili  0.3  /  DBili  x   /  AST  21  /  ALT  53[H]  /  AlkPhos  160[H]  11-08    LIVER FUNCTIONS - ( 08 Nov 2024 06:35 )  Alb: 4.0 g/dL / Pro: 7.2 g/dL / ALK PHOS: 160 U/L / ALT: 53 U/L / AST: 21 U/L / GGT: x                 Urinalysis Basic - ( 08 Nov 2024 06:35 )    Color: x / Appearance: x / SG: x / pH: x  Gluc: 313 mg/dL / Ketone: x  / Bili: x / Urobili: x   Blood: x / Protein: x / Nitrite: x   Leuk Esterase: x / RBC: x / WBC x   Sq Epi: x / Non Sq Epi: x / Bacteria: x                    A1C with Estimated Average Glucose Result: 10.0 % (11-08-24 @ 06:35)  A1C with Estimated Average Glucose Result: 12.1 % (04-01-24 @ 07:30)          RADIOLOGY, ECG, & ADDITIONAL TESTS:  12 Lead ECG:   Ventricular Rate 88 BPM    Atrial Rate 88 BPM    P-R Interval 128 ms    QRS Duration 82 ms    Q-T Interval 396 ms    QTC Calculation(Bazett) 479 ms    P Axis 19 degrees    R Axis 18 degrees    T Axis 36 degrees    Diagnosis Line Normal sinus rhythm  Normal ECG    Confirmed by Umer Robertson ([id number]) on 11/7/2024 3:59:40 PM (11-07-24 @ 12:43)      RECENT DIAGNOSTIC ORDERS:  Phosphorus: AM Sched. Collection: 11-Nov-2024 04:30 (11-08-24 @ 09:41)  Phosphorus: AM Sched. Collection: 10-Nov-2024 04:30 (11-08-24 @ 09:41)  Magnesium: AM Sched. Collection: 11-Nov-2024 04:30 (11-08-24 @ 09:41)  Magnesium: AM Sched. Collection: 10-Nov-2024 04:30 (11-08-24 @ 09:41)  Comprehensive Metabolic Panel: AM Sched. Collection: 11-Nov-2024 04:30 (11-08-24 @ 09:41)  Comprehensive Metabolic Panel: AM Sched. Collection: 10-Nov-2024 04:30 (11-08-24 @ 09:41)  Complete Blood Count + Automated Diff: AM Sched. Collection: 11-Nov-2024 04:30 (11-08-24 @ 09:41)  Complete Blood Count + Automated Diff: AM Sched. Collection: 10-Nov-2024 04:30 (11-08-24 @ 09:41)  Phosphorus: AM Sched. Collection: 09-Nov-2024 04:30 (11-08-24 @ 09:41)  Magnesium: AM Sched. Collection: 09-Nov-2024 04:30 (11-08-24 @ 09:41)  Comprehensive Metabolic Panel: AM Sched. Collection: 09-Nov-2024 04:30 (11-08-24 @ 09:41)  Complete Blood Count + Automated Diff: AM Sched. Collection: 09-Nov-2024 04:30 (11-08-24 @ 09:41)  Gram Stain: Routine  Specimen Source: sputum (11-07-24 @ 22:51)  Culture - Sputum: Routine  Specimen Source: Sputum (11-07-24 @ 22:51)  Diet, DASH/TLC:   Sodium & Cholesterol Restricted  Consistent Carbohydrate No Snacks (CSTCHO) (11-07-24 @ 22:22)  Procalcitonin: 23:30 (11-07-24 @ 22:12)  FluA/FluB/RSV/COVID PCR: STAT (11-07-24 @ 21:04)      MEDICATIONS:  MEDICATIONS  (STANDING):  albuterol/ipratropium for Nebulization 3 milliLiter(s) Nebulizer every 4 hours  amLODIPine   Tablet 5 milliGRAM(s) Oral daily  azithromycin  IVPB 500 milliGRAM(s) IV Intermittent every 24 hours  chlorhexidine 2% Cloths 1 Application(s) Topical <User Schedule>  dextrose 5%. 1000 milliLiter(s) (50 mL/Hr) IV Continuous <Continuous>  dextrose 5%. 1000 milliLiter(s) (100 mL/Hr) IV Continuous <Continuous>  dextrose 50% Injectable 25 Gram(s) IV Push once  enoxaparin Injectable 40 milliGRAM(s) SubCutaneous every 24 hours  famotidine    Tablet 40 milliGRAM(s) Oral daily  glucagon  Injectable 1 milliGRAM(s) IntraMuscular once  insulin glargine Injectable (LANTUS) 40 Unit(s) SubCutaneous once  insulin lispro (ADMELOG) corrective regimen sliding scale   SubCutaneous at bedtime  insulin lispro (ADMELOG) corrective regimen sliding scale   SubCutaneous three times a day before meals  insulin lispro Injectable (ADMELOG) 12 Unit(s) SubCutaneous three times a day before meals  metoprolol tartrate 25 milliGRAM(s) Oral three times a day  pantoprazole    Tablet 40 milliGRAM(s) Oral before breakfast  sertraline 25 milliGRAM(s) Oral daily  sodium chloride 0.9%. 1000 milliLiter(s) (100 mL/Hr) IV Continuous <Continuous>  sodium zirconium cyclosilicate 5 Gram(s) Oral once    MEDICATIONS  (PRN):  albuterol/ipratropium for Nebulization 3 milliLiter(s) Nebulizer every 6 hours PRN Shortness of Breath and/or Wheezing  dextrose Oral Gel 15 Gram(s) Oral once PRN Blood Glucose LESS THAN 70 milliGRAM(s)/deciliter  guaiFENesin Oral Liquid (Sugar-Free) 200 milliGRAM(s) Oral every 6 hours PRN Cough      HOME MEDICATIONS:  alcohol swabs (11-08)  azelastine nasal (11-07)  Dexcom G7  (11-08)  famotidine 40 mg oral tablet (11-07)  Freestyle Precision Carlos Strips (11-08)  Glucagon Emergency Kit for Low Blood Sugar 1 mg injection (11-08)  glucometer (per patient&#x27;s insurance) (11-08)  insulin glargine 100 units/mL subcutaneous solution (11-08)  insulin lispro 100 units/mL injectable solution (11-08)  Insulin Pen Needles, 4mm (11-08)  lancets (11-08)  metoprolol tartrate 25 mg oral tablet (11-07)  Norvasc 5 mg oral tablet (11-07)  rimegepant 75 mg oral tablet, disintegrating (11-07)  sertraline 25 mg oral tablet (11-07)  test strips (per patient&#x27;s insurance) (11-08)      PHYSICAL EXAM:  GENERAL:   CHEST/LUNG:   HEART:   ABDOMEN:   EXTREMITIES:

## 2024-11-08 NOTE — DISCHARGE NOTE PROVIDER - HOSPITAL COURSE
39 year old female with PMH of:  - DM1 (insulin pump)  - depression  - anxiety  - asthma   - GERD   - HTN   - obesity     Presenting for hyperglycemia. Patient reports that today she disconnected the insulin pump to take a shower then was not able to re-connect it. Even the nurse at the assisted living tried to help but was not able to connect it as well. Her fingerstick glucose was >500 so she presented to the ED.    #Poorly controlled type 1 DM (on insulin pump Medtronic 630 G)   #Hyperglycemia 2/2 to insulin pump malfunction   #DKA and HHS ruled out  #Diabetic neuropathy  - A1c 10   - at home on insulin pump   - Will discharge on Lantus 40 daily at 5PM, Lispro 12 with each meal.    #Asthma exacerbation likely secondary to viral illness   #Possible bronchitis   - No wheezing on my exam (received dexa IV and duonebs)  - lactate 1.6 // pH 7.38 (VBG) // WBC 13K  - COVID/ FLU / RSV: negative  - CXR: No radiographic evidence of acute cardiopulmonary disease.  - afebrile // on room air // no PNA on CXR   - c/w duoneb standing for 24 hours  - c/w duoneb PRN  - keep of steroids given the uncontrolled hyperglycemia and no wheezing anymore on exam   - azithro for 3 days (green sputum)  - Stable, ready to dc.    #HTN   - c/w norvasc and metoprolol    #Hx of anxiety/depression  #Hx of suicidal attempts (5-6 times)  # Pseudoseizures? (noted in prior notes)  - 5 or 6 prior lifetime psychiatric admissions for suicide attempts  (once by taking a capful of acetone, another by drinking hydrogen peroxide, another attempt by taking half a bottle of ibuprofen)  - following with psychiatry  - no active suicidal or homicidal ideation    #chronic back pain  #Bilateral foraminal stenosis  - MRI in 2024 -> L5-S1: Disc bulge, central disc herniation and a left central inferiorly migrating disc extrusion vs sequestration (new since 2018). Prominent epidural fat contributes to overall moderate spinal canal and bilateral foraminal stenosis, increased since 2018.    Patient is stable and ready to be discharged back to nursing home with sc insulin, until she gets the pump fixed.   39 year old female with PMH of:  - DM1 (insulin pump)  - depression  - anxiety  - asthma   - GERD   - HTN   - obesity     Presenting for hyperglycemia. Patient reports that today she disconnected the insulin pump to take a shower then was not able to re-connect it. Even the nurse at the assisted living tried to help but was not able to connect it as well. Her fingerstick glucose was >500 so she presented to the ED.    #Poorly controlled type 1 DM (on insulin pump Medtronic 630 G)   #Hyperglycemia 2/2 to insulin pump malfunction   #DKA and HHS ruled out  #Diabetic neuropathy  - A1c 10   - at home on insulin pump   - Subq insulin during this admission  - Restarted pump 11/11    #Asthma exacerbation likely secondary to viral illness   #Possible bronchitis   - No wheezing on my exam (received dexa IV and duonebs)  - lactate 1.6 // pH 7.38 (VBG) // WBC 13K  - COVID/ FLU / RSV: negative  - CXR: No radiographic evidence of acute cardiopulmonary disease.  - afebrile // on room air // no PNA on CXR   - c/w duoneb standing for 24 hours  - c/w duoneb PRN  - keep of steroids given the uncontrolled hyperglycemia and no wheezing anymore on exam   - azithro for 3 days (green sputum)  - Stable, ready to dc.    #HTN   - c/w norvasc and metoprolol    #Hx of anxiety/depression  #Hx of suicidal attempts (5-6 times)  # Pseudoseizures? (noted in prior notes)  - 5 or 6 prior lifetime psychiatric admissions for suicide attempts  (once by taking a capful of acetone, another by drinking hydrogen peroxide, another attempt by taking half a bottle of ibuprofen)  - following with psychiatry  - no active suicidal or homicidal ideation    #chronic back pain  #Bilateral foraminal stenosis  - MRI in 2024 -> L5-S1: Disc bulge, central disc herniation and a left central inferiorly migrating disc extrusion vs sequestration (new since 2018). Prominent epidural fat contributes to overall moderate spinal canal and bilateral foraminal stenosis, increased since 2018.    Patient is stable and ready to be discharged back to nursing home

## 2024-11-08 NOTE — DISCHARGE NOTE PROVIDER - ATTENDING DISCHARGE PHYSICAL EXAMINATION:
T(C): 36.7 (11-13-24 @ 13:14), Max: 37.2 (11-12-24 @ 21:13)  HR: 101 (11-13-24 @ 13:14) (76 - 101)  BP: 117/76 (11-13-24 @ 13:14) (110/75 - 117/76)  RR: 18 (11-13-24 @ 13:14) (16 - 18)  SpO2: --    CONSTITUTIONAL: Well groomed, no apparent distress  EYES: PERRLA and symmetric, EOMI, No conjunctival or scleral injection, non-icteric  ENMT: Oral mucosa with moist membranes. Normal dentition; no pharyngeal injection or exudates             NECK: Supple, symmetric and without tracheal deviation   RESP: No respiratory distress, CTA b/l, no WRR  CV: RRR, +S1S2, no MRG; no JVD; no peripheral edema  GI: Soft, NT, ND, no rebound, no guarding; no palpable masses; no hepatosplenomegaly; no hernia palpated  LYMPH: No cervical LAD or tenderness; no axillary LAD or tenderness; no inguinal LAD or tenderness  MSK: Normal gait; no edema on b/l LE  SKIN: No rashes or ulcers noted; no subcutaneous nodules or induration palpable  NEURO: CN II-XII intact; normal reflexes in upper and lower extremities, sensation intact in upper and lower extremities b/l to light touch   PSYCH: Appropriate insight/judgment; A+O x 3, mood and affect appropriate

## 2024-11-08 NOTE — DISCHARGE NOTE PROVIDER - PROVIDER TOKENS
PROVIDER:[TOKEN:[[id number]],FOLLOWUP:[1 week]] PROVIDER:[TOKEN:[[id number]],FOLLOWUP:[1 week]],PROVIDER:[TOKEN:[[id number]],FOLLOWUP:[1 week]] PROVIDER:[TOKEN:[[id number]],FOLLOWUP:[1 week]],PROVIDER:[TOKEN:[[id number]],FOLLOWUP:[1 week]],PROVIDER:[TOKEN:[[id number]],FOLLOWUP:[2 weeks]]

## 2024-11-08 NOTE — PROGRESS NOTE ADULT - ASSESSMENT
40 yo F with hx of DM I (on insulin pump), HTN, GERD and Anxiety/Depression presents to ED for hyperglycemia.    Hyperglycemia 2/2 DM II with malfunction insulin pump  - No evidence of DKA currently.   - c/w Insulin regimen for now.   - Monitor FS closely.   - HbA1c 10   - c/w IVF gentle hydration for now.   -Endo recs appreciated - will give Lantus and Lispro     Acute bronchitis   - CXR unremarkable.   - s/p decadron 10mg and albuterol nebs given in ED.  - Currently no wheezing on my exam --> hold off steroid.   - c/w albuterol prn   - will give Z-pack     HTN - c/w home med.   GERD - PPI   Anxiety/Depression - c/w home med.      DVT ppx: Lovenox SC    pending: pt unable to return till Monday

## 2024-11-08 NOTE — DISCHARGE NOTE PROVIDER - CARE PROVIDERS DIRECT ADDRESSES
,[email or web address]

## 2024-11-09 LAB
ALBUMIN SERPL ELPH-MCNC: 3.7 G/DL — SIGNIFICANT CHANGE UP (ref 3.5–5.2)
ALP SERPL-CCNC: 137 U/L — HIGH (ref 30–115)
ALT FLD-CCNC: 52 U/L — HIGH (ref 0–41)
ANION GAP SERPL CALC-SCNC: 12 MMOL/L — SIGNIFICANT CHANGE UP (ref 7–14)
AST SERPL-CCNC: 28 U/L — SIGNIFICANT CHANGE UP (ref 0–41)
BASOPHILS # BLD AUTO: 0.08 K/UL — SIGNIFICANT CHANGE UP (ref 0–0.2)
BASOPHILS NFR BLD AUTO: 0.6 % — SIGNIFICANT CHANGE UP (ref 0–1)
BILIRUB SERPL-MCNC: 0.3 MG/DL — SIGNIFICANT CHANGE UP (ref 0.2–1.2)
BUN SERPL-MCNC: 13 MG/DL — SIGNIFICANT CHANGE UP (ref 10–20)
CALCIUM SERPL-MCNC: 8.8 MG/DL — SIGNIFICANT CHANGE UP (ref 8.4–10.5)
CHLORIDE SERPL-SCNC: 100 MMOL/L — SIGNIFICANT CHANGE UP (ref 98–110)
CO2 SERPL-SCNC: 23 MMOL/L — SIGNIFICANT CHANGE UP (ref 17–32)
CREAT SERPL-MCNC: 0.5 MG/DL — LOW (ref 0.7–1.5)
EGFR: 122 ML/MIN/1.73M2 — SIGNIFICANT CHANGE UP
EOSINOPHIL # BLD AUTO: 0.09 K/UL — SIGNIFICANT CHANGE UP (ref 0–0.7)
EOSINOPHIL NFR BLD AUTO: 0.6 % — SIGNIFICANT CHANGE UP (ref 0–8)
GLUCOSE BLDC GLUCOMTR-MCNC: 166 MG/DL — HIGH (ref 70–99)
GLUCOSE BLDC GLUCOMTR-MCNC: 200 MG/DL — HIGH (ref 70–99)
GLUCOSE BLDC GLUCOMTR-MCNC: 237 MG/DL — HIGH (ref 70–99)
GLUCOSE BLDC GLUCOMTR-MCNC: 303 MG/DL — HIGH (ref 70–99)
GLUCOSE SERPL-MCNC: 256 MG/DL — HIGH (ref 70–99)
HCT VFR BLD CALC: 34.4 % — LOW (ref 37–47)
HGB BLD-MCNC: 10.3 G/DL — LOW (ref 12–16)
IMM GRANULOCYTES NFR BLD AUTO: 0.9 % — HIGH (ref 0.1–0.3)
LYMPHOCYTES # BLD AUTO: 27 % — SIGNIFICANT CHANGE UP (ref 20.5–51.1)
LYMPHOCYTES # BLD AUTO: 3.85 K/UL — HIGH (ref 1.2–3.4)
MAGNESIUM SERPL-MCNC: 1.7 MG/DL — LOW (ref 1.8–2.4)
MCHC RBC-ENTMCNC: 23.5 PG — LOW (ref 27–31)
MCHC RBC-ENTMCNC: 29.9 G/DL — LOW (ref 32–37)
MCV RBC AUTO: 78.4 FL — LOW (ref 81–99)
MONOCYTES # BLD AUTO: 1.11 K/UL — HIGH (ref 0.1–0.6)
MONOCYTES NFR BLD AUTO: 7.8 % — SIGNIFICANT CHANGE UP (ref 1.7–9.3)
NEUTROPHILS # BLD AUTO: 9.02 K/UL — HIGH (ref 1.4–6.5)
NEUTROPHILS NFR BLD AUTO: 63.1 % — SIGNIFICANT CHANGE UP (ref 42.2–75.2)
NRBC # BLD: 0 /100 WBCS — SIGNIFICANT CHANGE UP (ref 0–0)
PHOSPHATE SERPL-MCNC: 3.8 MG/DL — SIGNIFICANT CHANGE UP (ref 2.1–4.9)
PLATELET # BLD AUTO: 591 K/UL — HIGH (ref 130–400)
PMV BLD: 10.4 FL — SIGNIFICANT CHANGE UP (ref 7.4–10.4)
POTASSIUM SERPL-MCNC: 4.6 MMOL/L — SIGNIFICANT CHANGE UP (ref 3.5–5)
POTASSIUM SERPL-SCNC: 4.6 MMOL/L — SIGNIFICANT CHANGE UP (ref 3.5–5)
PROT SERPL-MCNC: 6.7 G/DL — SIGNIFICANT CHANGE UP (ref 6–8)
RBC # BLD: 4.39 M/UL — SIGNIFICANT CHANGE UP (ref 4.2–5.4)
RBC # FLD: 16.2 % — HIGH (ref 11.5–14.5)
SODIUM SERPL-SCNC: 135 MMOL/L — SIGNIFICANT CHANGE UP (ref 135–146)
WBC # BLD: 14.28 K/UL — HIGH (ref 4.8–10.8)
WBC # FLD AUTO: 14.28 K/UL — HIGH (ref 4.8–10.8)

## 2024-11-09 PROCEDURE — 99232 SBSQ HOSP IP/OBS MODERATE 35: CPT

## 2024-11-09 RX ORDER — CLOTRIMAZOLE 10 MG/G
1 CREAM TOPICAL AT BEDTIME
Refills: 0 | Status: DISCONTINUED | OUTPATIENT
Start: 2024-11-09 | End: 2024-11-10

## 2024-11-09 RX ORDER — INSULIN GLARGINE,HUM.REC.ANLOG 100/ML
40 VIAL (ML) SUBCUTANEOUS
Refills: 0 | Status: DISCONTINUED | OUTPATIENT
Start: 2024-11-09 | End: 2024-11-10

## 2024-11-09 RX ORDER — INSULIN LISPRO 100/ML
14 VIAL (ML) SUBCUTANEOUS
Refills: 0 | Status: DISCONTINUED | OUTPATIENT
Start: 2024-11-09 | End: 2024-11-10

## 2024-11-09 RX ORDER — INSULIN GLARGINE,HUM.REC.ANLOG 100/ML
40 VIAL (ML) SUBCUTANEOUS
Qty: 12 | Refills: 0
Start: 2024-11-09 | End: 2024-12-08

## 2024-11-09 RX ORDER — MAGNESIUM SULFATE IN 0.9% NACL 2 G/50 ML
2 INTRAVENOUS SOLUTION, PIGGYBACK (ML) INTRAVENOUS ONCE
Refills: 0 | Status: COMPLETED | OUTPATIENT
Start: 2024-11-09 | End: 2024-11-09

## 2024-11-09 RX ADMIN — Medication 25 MILLIGRAM(S): at 11:10

## 2024-11-09 RX ADMIN — Medication 25 MILLIGRAM(S): at 05:51

## 2024-11-09 RX ADMIN — Medication 40 UNIT(S): at 17:05

## 2024-11-09 RX ADMIN — FAMOTIDINE 40 MILLIGRAM(S): 10 INJECTION INTRAVENOUS at 11:10

## 2024-11-09 RX ADMIN — Medication 2: at 08:40

## 2024-11-09 RX ADMIN — PANTOPRAZOLE SODIUM 40 MILLIGRAM(S): 40 TABLET, DELAYED RELEASE ORAL at 05:53

## 2024-11-09 RX ADMIN — Medication 25 MILLIGRAM(S): at 22:00

## 2024-11-09 RX ADMIN — GUAIFENESIN 200 MILLIGRAM(S): 100 LIQUID ORAL at 17:05

## 2024-11-09 RX ADMIN — Medication 5 MILLIGRAM(S): at 05:51

## 2024-11-09 RX ADMIN — SERTRALINE HYDROCHLORIDE 25 MILLIGRAM(S): 50 TABLET, FILM COATED ORAL at 11:10

## 2024-11-09 RX ADMIN — Medication 14 UNIT(S): at 11:11

## 2024-11-09 RX ADMIN — CHLORHEXIDINE GLUCONATE 1 APPLICATION(S): 40 SOLUTION TOPICAL at 05:52

## 2024-11-09 RX ADMIN — Medication 14 UNIT(S): at 17:07

## 2024-11-09 RX ADMIN — Medication 25 GRAM(S): at 17:05

## 2024-11-09 RX ADMIN — Medication 4: at 17:07

## 2024-11-09 RX ADMIN — Medication 8: at 11:11

## 2024-11-09 NOTE — PROGRESS NOTE ADULT - ASSESSMENT
39 y.o F presenting for hyperglycemia after the insulin pump was disconnected    #Poorly controlled type 1 DM (on insulin pump Medtronic 630 G)   #Hyperglycemia 2/2 to insulin pump malfunction   #DKA and HHS ruled out  #Diabetic neuropathy  - A1C 10  - Endo consulted, glargine 40 units at 5 PM every day and lispro 14 units before meals       #Asthma exacerbation likely secondary to viral illness   #Possible bronchitis   - wheezing on presentation; but no wheezing on my exam (received dexa IV and duonebs)  - lactate 1.6 // pH 7.38 (VBG) // WBC 13K  - COVID/ FLU / RSV: negative  - CXR: No radiographic evidence of acute cardiopulmonary disease.  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 Prominent epidural fat contributes to overall moderate spinal canal and bilateral foraminal stenosis, increased since 2018.    #Migraine  - stable     #Prior findings  - in July 2024: Too small to characterize hypodense lesions in the right hepatic lobe up to 1.5 cm.   - OP f up     #Obesity   - OP f up     #Diet: CC diet  #DVT pro: lovenox  #GI pro: pantoprazole  #Activity: as tolerated  #Dispo: med   #Med rec: done  #Code status: full   #Domiciled at [facility]

## 2024-11-09 NOTE — PROGRESS NOTE ADULT - SUBJECTIVE AND OBJECTIVE BOX
INTERVAL HPI/OVERNIGHT EVENTS:  Patient was seen and examined at bedside. As per nurse and patient, no o/n events, patient resting comfortably. No complaints at this time. Patient denies: fever, chills, dizziness, weakness, HA, Changes in vision, CP, palpitations, SOB, cough, N/V/D/C, dysuria, changes in bowel movements, LE edema. ROS otherwise negative.    VITAL SIGNS:  T(F): 98 (11-08-24 @ 22:02)  HR: 77 (11-09-24 @ 05:47)  BP: 166/74 (11-09-24 @ 05:47)  RR: 18 (11-08-24 @ 22:02)  SpO2: --  Wt(kg): --    PHYSICAL EXAM:    Constitutional: WDWN, NAD  HEENT: PERRL, EOMI, sclera non-icteric, neck supple, trachea midline, no masses, no JVD, MMM, good dentition  Respiratory: CTA b/l, good air entry b/l, no wheezing, no rhonchi, no rales, without accessory muscle use and no intercostal retractions  Cardiovascular: RRR, normal S1S2, no M/R/G  Gastrointestinal: soft, NTND, no masses palpable, BS normal  Extremities: Warm, well perfused, pulses equal bilateral upper and lower extremities, no edema, no clubbing  Neurological: AAOx3, CN Grossly intact  Skin: Normal temperature, warm, dry    MEDICATIONS  (STANDING):  albuterol/ipratropium for Nebulization 3 milliLiter(s) Nebulizer every 4 hours  amLODIPine   Tablet 5 milliGRAM(s) Oral daily  azithromycin  IVPB 500 milliGRAM(s) IV Intermittent every 24 hours  chlorhexidine 2% Cloths 1 Application(s) Topical <User Schedule>  dextrose 5%. 1000 milliLiter(s) (50 mL/Hr) IV Continuous <Continuous>  dextrose 5%. 1000 milliLiter(s) (100 mL/Hr) IV Continuous <Continuous>  dextrose 50% Injectable 25 Gram(s) IV Push once  enoxaparin Injectable 40 milliGRAM(s) SubCutaneous every 24 hours  famotidine    Tablet 40 milliGRAM(s) Oral daily  glucagon  Injectable 1 milliGRAM(s) IntraMuscular once  insulin glargine Injectable (LANTUS) 40 Unit(s) SubCutaneous <User Schedule>  insulin lispro (ADMELOG) corrective regimen sliding scale   SubCutaneous at bedtime  insulin lispro (ADMELOG) corrective regimen sliding scale   SubCutaneous three times a day before meals  insulin lispro Injectable (ADMELOG) 14 Unit(s) SubCutaneous three times a day before meals  metoprolol tartrate 25 milliGRAM(s) Oral three times a day  pantoprazole    Tablet 40 milliGRAM(s) Oral before breakfast  sertraline 25 milliGRAM(s) Oral daily  sodium chloride 0.9%. 1000 milliLiter(s) (100 mL/Hr) IV Continuous <Continuous>    MEDICATIONS  (PRN):  albuterol/ipratropium for Nebulization 3 milliLiter(s) Nebulizer every 6 hours PRN Shortness of Breath and/or Wheezing  dextrose Oral Gel 15 Gram(s) Oral once PRN Blood Glucose LESS THAN 70 milliGRAM(s)/deciliter  guaiFENesin Oral Liquid (Sugar-Free) 200 milliGRAM(s) Oral every 6 hours PRN Cough      Allergies    amoxicillin (Hives)  Fluad 0.5 ML ODETTE (Unknown)  Ceclor (Rash)  Clindamycin Phosphate (Unknown)  penicillins (Hives)  clindamycin (Hives; Nephrotoxicity)    Intolerances    gabapentin (Other)  Influenza Virus Vaccine, H5N1, Inactivated (Other)  Cipro (Other)      LABS:                        10.4   14.91 )-----------( 567      ( 08 Nov 2024 06:35 )             34.6     11-08    140  |  102  |  12  ----------------------------<  313[H]  5.4[H]   |  24  |  0.5[L]    Ca    9.5      08 Nov 2024 06:35  Phos  4.3     11-08  Mg     2.2     11-08    TPro  7.2  /  Alb  4.0  /  TBili  0.3  /  DBili  x   /  AST  21  /  ALT  53[H]  /  AlkPhos  160[H]  11-08      Urinalysis Basic - ( 08 Nov 2024 06:35 )    Color: x / Appearance: x / SG: x / pH: x  Gluc: 313 mg/dL / Ketone: x  / Bili: x / Urobili: x   Blood: x / Protein: x / Nitrite: x   Leuk Esterase: x / RBC: x / WBC x   Sq Epi: x / Non Sq Epi: x / Bacteria: x        RADIOLOGY & ADDITIONAL TESTS:  Reviewed

## 2024-11-10 LAB
ALBUMIN SERPL ELPH-MCNC: 3.8 G/DL — SIGNIFICANT CHANGE UP (ref 3.5–5.2)
ALP SERPL-CCNC: 142 U/L — HIGH (ref 30–115)
ALT FLD-CCNC: 50 U/L — HIGH (ref 0–41)
ANION GAP SERPL CALC-SCNC: 11 MMOL/L — SIGNIFICANT CHANGE UP (ref 7–14)
APPEARANCE UR: ABNORMAL
AST SERPL-CCNC: 26 U/L — SIGNIFICANT CHANGE UP (ref 0–41)
BACTERIA # UR AUTO: ABNORMAL /HPF
BASOPHILS # BLD AUTO: 0.06 K/UL — SIGNIFICANT CHANGE UP (ref 0–0.2)
BASOPHILS NFR BLD AUTO: 0.6 % — SIGNIFICANT CHANGE UP (ref 0–1)
BILIRUB SERPL-MCNC: 0.3 MG/DL — SIGNIFICANT CHANGE UP (ref 0.2–1.2)
BILIRUB UR-MCNC: NEGATIVE — SIGNIFICANT CHANGE UP
BUN SERPL-MCNC: 9 MG/DL — LOW (ref 10–20)
CALCIUM SERPL-MCNC: 9.3 MG/DL — SIGNIFICANT CHANGE UP (ref 8.4–10.5)
CAST: 1 /LPF — SIGNIFICANT CHANGE UP (ref 0–4)
CHLORIDE SERPL-SCNC: 100 MMOL/L — SIGNIFICANT CHANGE UP (ref 98–110)
CO2 SERPL-SCNC: 24 MMOL/L — SIGNIFICANT CHANGE UP (ref 17–32)
COLOR SPEC: YELLOW — SIGNIFICANT CHANGE UP
CREAT SERPL-MCNC: 0.5 MG/DL — LOW (ref 0.7–1.5)
DIFF PNL FLD: ABNORMAL
EGFR: 122 ML/MIN/1.73M2 — SIGNIFICANT CHANGE UP
EOSINOPHIL # BLD AUTO: 0.1 K/UL — SIGNIFICANT CHANGE UP (ref 0–0.7)
EOSINOPHIL NFR BLD AUTO: 1.1 % — SIGNIFICANT CHANGE UP (ref 0–8)
GLUCOSE BLDC GLUCOMTR-MCNC: 156 MG/DL — HIGH (ref 70–99)
GLUCOSE BLDC GLUCOMTR-MCNC: 157 MG/DL — HIGH (ref 70–99)
GLUCOSE BLDC GLUCOMTR-MCNC: 214 MG/DL — HIGH (ref 70–99)
GLUCOSE BLDC GLUCOMTR-MCNC: 257 MG/DL — HIGH (ref 70–99)
GLUCOSE BLDC GLUCOMTR-MCNC: 267 MG/DL — HIGH (ref 70–99)
GLUCOSE SERPL-MCNC: 294 MG/DL — HIGH (ref 70–99)
GLUCOSE UR QL: >=1000 MG/DL
HCT VFR BLD CALC: 36.5 % — LOW (ref 37–47)
HGB BLD-MCNC: 10.9 G/DL — LOW (ref 12–16)
IMM GRANULOCYTES NFR BLD AUTO: 1.5 % — HIGH (ref 0.1–0.3)
KETONES UR-MCNC: ABNORMAL MG/DL
LEUKOCYTE ESTERASE UR-ACNC: ABNORMAL
LYMPHOCYTES # BLD AUTO: 2.73 K/UL — SIGNIFICANT CHANGE UP (ref 1.2–3.4)
LYMPHOCYTES # BLD AUTO: 29.5 % — SIGNIFICANT CHANGE UP (ref 20.5–51.1)
MAGNESIUM SERPL-MCNC: 1.9 MG/DL — SIGNIFICANT CHANGE UP (ref 1.8–2.4)
MCHC RBC-ENTMCNC: 23.5 PG — LOW (ref 27–31)
MCHC RBC-ENTMCNC: 29.9 G/DL — LOW (ref 32–37)
MCV RBC AUTO: 78.8 FL — LOW (ref 81–99)
MONOCYTES # BLD AUTO: 0.73 K/UL — HIGH (ref 0.1–0.6)
MONOCYTES NFR BLD AUTO: 7.9 % — SIGNIFICANT CHANGE UP (ref 1.7–9.3)
NEUTROPHILS # BLD AUTO: 5.49 K/UL — SIGNIFICANT CHANGE UP (ref 1.4–6.5)
NEUTROPHILS NFR BLD AUTO: 59.4 % — SIGNIFICANT CHANGE UP (ref 42.2–75.2)
NITRITE UR-MCNC: NEGATIVE — SIGNIFICANT CHANGE UP
NRBC # BLD: 0 /100 WBCS — SIGNIFICANT CHANGE UP (ref 0–0)
PH UR: 7 — SIGNIFICANT CHANGE UP (ref 5–8)
PHOSPHATE SERPL-MCNC: 3.7 MG/DL — SIGNIFICANT CHANGE UP (ref 2.1–4.9)
PLATELET # BLD AUTO: 531 K/UL — HIGH (ref 130–400)
PMV BLD: 10.3 FL — SIGNIFICANT CHANGE UP (ref 7.4–10.4)
POTASSIUM SERPL-MCNC: 4.6 MMOL/L — SIGNIFICANT CHANGE UP (ref 3.5–5)
POTASSIUM SERPL-SCNC: 4.6 MMOL/L — SIGNIFICANT CHANGE UP (ref 3.5–5)
PROT SERPL-MCNC: 6.9 G/DL — SIGNIFICANT CHANGE UP (ref 6–8)
PROT UR-MCNC: SIGNIFICANT CHANGE UP MG/DL
RBC # BLD: 4.63 M/UL — SIGNIFICANT CHANGE UP (ref 4.2–5.4)
RBC # FLD: 16.3 % — HIGH (ref 11.5–14.5)
RBC CASTS # UR COMP ASSIST: 2 /HPF — SIGNIFICANT CHANGE UP (ref 0–4)
SODIUM SERPL-SCNC: 135 MMOL/L — SIGNIFICANT CHANGE UP (ref 135–146)
SP GR SPEC: 1.03 — SIGNIFICANT CHANGE UP (ref 1–1.03)
SQUAMOUS # UR AUTO: 9 /HPF — HIGH (ref 0–5)
UROBILINOGEN FLD QL: 1 MG/DL — SIGNIFICANT CHANGE UP (ref 0.2–1)
WBC # BLD: 9.25 K/UL — SIGNIFICANT CHANGE UP (ref 4.8–10.8)
WBC # FLD AUTO: 9.25 K/UL — SIGNIFICANT CHANGE UP (ref 4.8–10.8)
WBC UR QL: 12 /HPF — HIGH (ref 0–5)
YEAST-LIKE CELLS: PRESENT

## 2024-11-10 PROCEDURE — 99232 SBSQ HOSP IP/OBS MODERATE 35: CPT

## 2024-11-10 RX ORDER — INSULIN GLARGINE,HUM.REC.ANLOG 100/ML
50 VIAL (ML) SUBCUTANEOUS AT BEDTIME
Refills: 0 | Status: DISCONTINUED | OUTPATIENT
Start: 2024-11-10 | End: 2024-11-11

## 2024-11-10 RX ORDER — CLOTRIMAZOLE 10 MG/G
1 CREAM TOPICAL AT BEDTIME
Refills: 0 | Status: DISCONTINUED | OUTPATIENT
Start: 2024-11-10 | End: 2024-11-12

## 2024-11-10 RX ORDER — INSULIN LISPRO 100/ML
16 VIAL (ML) SUBCUTANEOUS
Refills: 0 | Status: DISCONTINUED | OUTPATIENT
Start: 2024-11-10 | End: 2024-11-11

## 2024-11-10 RX ORDER — FLUCONAZOLE, SODIUM CHLORIDE 2 MG/ML
150 INJECTION INTRAVENOUS ONCE
Refills: 0 | Status: COMPLETED | OUTPATIENT
Start: 2024-11-10 | End: 2024-11-10

## 2024-11-10 RX ADMIN — CLOTRIMAZOLE 1 APPLICATORFUL: 10 CREAM TOPICAL at 23:04

## 2024-11-10 RX ADMIN — Medication 16 UNIT(S): at 17:18

## 2024-11-10 RX ADMIN — Medication 1: at 23:23

## 2024-11-10 RX ADMIN — Medication 25 MILLIGRAM(S): at 23:06

## 2024-11-10 RX ADMIN — Medication 40 MILLIGRAM(S): at 00:52

## 2024-11-10 RX ADMIN — Medication 40 MILLIGRAM(S): at 23:05

## 2024-11-10 RX ADMIN — Medication 14 UNIT(S): at 08:39

## 2024-11-10 RX ADMIN — PANTOPRAZOLE SODIUM 40 MILLIGRAM(S): 40 TABLET, DELAYED RELEASE ORAL at 05:25

## 2024-11-10 RX ADMIN — Medication 2: at 17:17

## 2024-11-10 RX ADMIN — AZITHROMYCIN DIHYDRATE 255 MILLIGRAM(S): 200 POWDER, FOR SUSPENSION ORAL at 00:52

## 2024-11-10 RX ADMIN — Medication 4: at 11:39

## 2024-11-10 RX ADMIN — FAMOTIDINE 40 MILLIGRAM(S): 10 INJECTION INTRAVENOUS at 11:40

## 2024-11-10 RX ADMIN — Medication 14 UNIT(S): at 11:39

## 2024-11-10 RX ADMIN — SERTRALINE HYDROCHLORIDE 25 MILLIGRAM(S): 50 TABLET, FILM COATED ORAL at 11:41

## 2024-11-10 RX ADMIN — Medication 50 UNIT(S): at 23:21

## 2024-11-10 RX ADMIN — Medication 5 MILLIGRAM(S): at 05:27

## 2024-11-10 RX ADMIN — CHLORHEXIDINE GLUCONATE 1 APPLICATION(S): 40 SOLUTION TOPICAL at 05:25

## 2024-11-10 RX ADMIN — Medication 25 MILLIGRAM(S): at 05:24

## 2024-11-10 RX ADMIN — Medication 25 MILLIGRAM(S): at 11:41

## 2024-11-10 RX ADMIN — Medication 6: at 08:38

## 2024-11-10 RX ADMIN — FLUCONAZOLE, SODIUM CHLORIDE 150 MILLIGRAM(S): 2 INJECTION INTRAVENOUS at 17:18

## 2024-11-10 NOTE — PROGRESS NOTE ADULT - ASSESSMENT
38 yo F with hx of DM I (on insulin pump), HTN, GERD and Anxiety/Depression presents to ED for hyperglycemia.    Hyperglycemia 2/2 DM II with malfunction insulin pump  - No evidence of DKA currently.   - c/w Insulin regimen for now.   - Monitor FS closely.   - HbA1c 10   - c/w IVF gentle hydration for now.   -Endo recs appreciated - Recommend discharge on Basaglar Kwikpen 40 units q24hr and Lispro Kwikpen 14 units tid ac until pump therapy reestablished.     Acute bronchitis   - CXR unremarkable.   - s/p decadron 10mg and albuterol nebs given in ED.  - Currently no wheezing on my exam --> hold off steroid.   - c/w albuterol prn   - will give Z-pack     HTN - c/w home med.   GERD - PPI   Anxiety/Depression - c/w home med.      DVT ppx: Lovenox SC    pending: pt unable to return till Monday, Monitor FSG and adjust insulin .   40 yo F with hx of DM I (on insulin pump), HTN, GERD and Anxiety/Depression presents to ED for hyperglycemia.    Hyperglycemia 2/2 DM II with malfunction insulin pump  - No evidence of DKA currently.   - c/w Insulin regimen for now.   - Monitor FS closely.   - HbA1c 10   - c/w IVF gentle hydration for now.   -Endo recs appreciated - increase Lantus to 50 units q5pm and Lispro to 16 units TIDAC    Acute bronchitis   - CXR unremarkable.   - s/p decadron 10mg and albuterol nebs given in ED.  - Currently no wheezing on my exam --> hold off steroid.   - c/w albuterol prn   - will give Z-pack     HTN - c/w home med.   GERD - PPI   Anxiety/Depression - c/w home med.      DVT ppx: Lovenox SC    pending: pt unable to return till Monday, Monitor FSG and adjust insulin    38 yo F with hx of DM I (on insulin pump), HTN, GERD and Anxiety/Depression presents to ED for hyperglycemia.    Hyperglycemia 2/2 DM II with malfunction insulin pump  - No evidence of DKA currently.   - c/w Insulin regimen for now.   - Monitor FS closely.   - HbA1c 10   - c/w IVF gentle hydration for now.   -Endo recs appreciated - increase Lantus to 50 units q5pm and Lispro to 16 units TIDAC    Vaginal itching and pain  Fluconazole 150 x 1   miconazole 2% cream  UA       Acute bronchitis   - CXR unremarkable.   - s/p decadron 10mg and albuterol nebs given in ED.  - Currently no wheezing on my exam --> hold off steroid.   - c/w albuterol prn   - will give Z-pack     HTN - c/w home med.   GERD - PPI   Anxiety/Depression - c/w home med.      DVT ppx: Lovenox SC    pending: pt unable to return till Monday, Monitor FSG and adjust insulin

## 2024-11-10 NOTE — PROGRESS NOTE ADULT - SUBJECTIVE AND OBJECTIVE BOX
CRISTI MONTGOMERY  39y, Female  Allergy: amoxicillin (Hives)  Fluad 0.5 ML ODETTE (Unknown)  Ceclor (Rash)  Clindamycin Phosphate (Unknown)  penicillins (Hives)  gabapentin (Other)  Influenza Virus Vaccine, H5N1, Inactivated (Other)  Cipro (Other)  clindamycin (Hives; Nephrotoxicity)    Hospital Day: 3d    Patient seen and examined earlier today.  No acute events overnight.     PMH/PSH:  PAST MEDICAL & SURGICAL HISTORY:  Neuropathy  right lower extremity, vaginal      Type 1 diabetes      Degenerative disc disease, thoracic      Urinary tract infection, recurrent      Gastroesophageal reflux disease, esophagitis presence not specified      Intractable headache, unspecified chronicity pattern, unspecified headache type      Major depressive disorder with single episode, in full remission  previously treated with zyprexa, celexa and lexapro      Tremor of right hand      Tachycardia      Spinal stenosis      No significant past surgical history          LAST 24-Hr EVENTS:    VITALS:  T(F): 97.8 (11-10-24 @ 05:40), Max: 98.3 (11-09-24 @ 20:30)  HR: 97 (11-10-24 @ 05:40)  BP: 119/75 (11-10-24 @ 05:40) (119/75 - 133/78)  RR: 18 (11-10-24 @ 05:40)  SpO2: --          TESTS & MEASUREMENTS:  Weight/BMI  87.6 (11-07-24 @ 22:45)  33.1 (11-07-24 @ 22:45)    11-08-24 @ 07:01  -  11-09-24 @ 07:00  --------------------------------------------------------  IN: 600 mL / OUT: 0 mL / NET: 600 mL                            10.9   9.25  )-----------( 531      ( 10 Nov 2024 09:10 )             36.5         11-10    135  |  100  |  9[L]  ----------------------------<  294[H]  4.6   |  24  |  0.5[L]    Ca    9.3      10 Nov 2024 09:10  Phos  3.7     11-10  Mg     1.9     11-10    TPro  6.9  /  Alb  3.8  /  TBili  0.3  /  DBili  x   /  AST  26  /  ALT  50[H]  /  AlkPhos  142[H]  11-10    LIVER FUNCTIONS - ( 10 Nov 2024 09:10 )  Alb: 3.8 g/dL / Pro: 6.9 g/dL / ALK PHOS: 142 U/L / ALT: 50 U/L / AST: 26 U/L / GGT: x                 Urinalysis Basic - ( 10 Nov 2024 09:10 )    Color: x / Appearance: x / SG: x / pH: x  Gluc: 294 mg/dL / Ketone: x  / Bili: x / Urobili: x   Blood: x / Protein: x / Nitrite: x   Leuk Esterase: x / RBC: x / WBC x   Sq Epi: x / Non Sq Epi: x / Bacteria: x      Procalcitonin: 0.18 ng/mL (11-08-24 @ 00:44)                A1C with Estimated Average Glucose Result: 10.0 % (11-08-24 @ 06:35)  A1C with Estimated Average Glucose Result: 12.1 % (04-01-24 @ 07:30)          RADIOLOGY, ECG, & ADDITIONAL TESTS:  12 Lead ECG:   Ventricular Rate 88 BPM    Atrial Rate 88 BPM    P-R Interval 128 ms    QRS Duration 82 ms    Q-T Interval 396 ms    QTC Calculation(Bazett) 479 ms    P Axis 19 degrees    R Axis 18 degrees    T Axis 36 degrees    Diagnosis Line Normal sinus rhythm  Normal ECG    Confirmed by Umer Robertson ([id number]) on 11/7/2024 3:59:40 PM (11-07-24 @ 12:43)      RECENT DIAGNOSTIC ORDERS:      MEDICATIONS:  MEDICATIONS  (STANDING):  albuterol/ipratropium for Nebulization 3 milliLiter(s) Nebulizer every 4 hours  amLODIPine   Tablet 5 milliGRAM(s) Oral daily  chlorhexidine 2% Cloths 1 Application(s) Topical <User Schedule>  clotrimazole 2% Vaginal Cream 1 Applicatorful Vaginal at bedtime  dextrose 5%. 1000 milliLiter(s) (50 mL/Hr) IV Continuous <Continuous>  dextrose 5%. 1000 milliLiter(s) (100 mL/Hr) IV Continuous <Continuous>  dextrose 50% Injectable 25 Gram(s) IV Push once  enoxaparin Injectable 40 milliGRAM(s) SubCutaneous every 24 hours  famotidine    Tablet 40 milliGRAM(s) Oral daily  glucagon  Injectable 1 milliGRAM(s) IntraMuscular once  insulin glargine Injectable (LANTUS) 40 Unit(s) SubCutaneous <User Schedule>  insulin lispro (ADMELOG) corrective regimen sliding scale   SubCutaneous at bedtime  insulin lispro (ADMELOG) corrective regimen sliding scale   SubCutaneous three times a day before meals  insulin lispro Injectable (ADMELOG) 14 Unit(s) SubCutaneous three times a day before meals  metoprolol tartrate 25 milliGRAM(s) Oral three times a day  pantoprazole    Tablet 40 milliGRAM(s) Oral before breakfast  sertraline 25 milliGRAM(s) Oral daily  sodium chloride 0.9%. 1000 milliLiter(s) (100 mL/Hr) IV Continuous <Continuous>    MEDICATIONS  (PRN):  albuterol/ipratropium for Nebulization 3 milliLiter(s) Nebulizer every 6 hours PRN Shortness of Breath and/or Wheezing  dextrose Oral Gel 15 Gram(s) Oral once PRN Blood Glucose LESS THAN 70 milliGRAM(s)/deciliter  guaiFENesin Oral Liquid (Sugar-Free) 200 milliGRAM(s) Oral every 6 hours PRN Cough      HOME MEDICATIONS:  alcohol swabs (11-08)  azelastine nasal (11-07)  Dexcom G7  (11-08)  famotidine 40 mg oral tablet (11-07)  Freestyle Precision Carlos Strips (11-08)  Glucagon Emergency Kit for Low Blood Sugar 1 mg injection (11-08)  glucometer (per patient&#x27;s insurance) (11-08)  insulin glargine 100 units/mL subcutaneous solution (11-08)  insulin lispro 100 units/mL injectable solution (11-08)  Insulin Pen Needles, 4mm (11-08)  lancets (11-08)  metoprolol tartrate 25 mg oral tablet (11-07)  Norvasc 5 mg oral tablet (11-07)  rimegepant 75 mg oral tablet, disintegrating (11-07)  sertraline 25 mg oral tablet (11-07)  test strips (per patient&#x27;s insurance) (11-08)      PHYSICAL EXAM:  Constitutional: WDWN, NAD  HEENT: sclera non-icteric, neck supple  Respiratory: Non labored respirations  Cardiovascular: RRR, normal S1S2, no M/R/G  Gastrointestinal: Soft Nondistended  Extremities: no edema, no clubbing  Neurological: AAOx3, CN Grossly intact  Skin: Normal temperature, warm, dry

## 2024-11-11 ENCOUNTER — APPOINTMENT (OUTPATIENT)
Dept: PSYCHIATRY | Facility: CLINIC | Age: 39
End: 2024-11-11

## 2024-11-11 ENCOUNTER — NON-APPOINTMENT (OUTPATIENT)
Age: 39
End: 2024-11-11

## 2024-11-11 LAB
ALBUMIN SERPL ELPH-MCNC: 3.7 G/DL — SIGNIFICANT CHANGE UP (ref 3.5–5.2)
ALP SERPL-CCNC: 153 U/L — HIGH (ref 30–115)
ALT FLD-CCNC: 46 U/L — HIGH (ref 0–41)
ANION GAP SERPL CALC-SCNC: 12 MMOL/L — SIGNIFICANT CHANGE UP (ref 7–14)
APPEARANCE UR: ABNORMAL
AST SERPL-CCNC: 22 U/L — SIGNIFICANT CHANGE UP (ref 0–41)
BACTERIA # UR AUTO: ABNORMAL /HPF
BASOPHILS # BLD AUTO: 0.1 K/UL — SIGNIFICANT CHANGE UP (ref 0–0.2)
BASOPHILS NFR BLD AUTO: 0.9 % — SIGNIFICANT CHANGE UP (ref 0–1)
BILIRUB SERPL-MCNC: <0.2 MG/DL — SIGNIFICANT CHANGE UP (ref 0.2–1.2)
BILIRUB UR-MCNC: NEGATIVE — SIGNIFICANT CHANGE UP
BUN SERPL-MCNC: 13 MG/DL — SIGNIFICANT CHANGE UP (ref 10–20)
CALCIUM SERPL-MCNC: 9.3 MG/DL — SIGNIFICANT CHANGE UP (ref 8.4–10.5)
CAST: 1 /LPF — SIGNIFICANT CHANGE UP (ref 0–4)
CHLORIDE SERPL-SCNC: 98 MMOL/L — SIGNIFICANT CHANGE UP (ref 98–110)
CO2 SERPL-SCNC: 23 MMOL/L — SIGNIFICANT CHANGE UP (ref 17–32)
COLOR SPEC: YELLOW — SIGNIFICANT CHANGE UP
CREAT SERPL-MCNC: 0.6 MG/DL — LOW (ref 0.7–1.5)
DIFF PNL FLD: ABNORMAL
EGFR: 117 ML/MIN/1.73M2 — SIGNIFICANT CHANGE UP
EOSINOPHIL # BLD AUTO: 0.22 K/UL — SIGNIFICANT CHANGE UP (ref 0–0.7)
EOSINOPHIL NFR BLD AUTO: 1.9 % — SIGNIFICANT CHANGE UP (ref 0–8)
GLUCOSE BLDC GLUCOMTR-MCNC: 254 MG/DL — HIGH (ref 70–99)
GLUCOSE BLDC GLUCOMTR-MCNC: 288 MG/DL — HIGH (ref 70–99)
GLUCOSE BLDC GLUCOMTR-MCNC: 297 MG/DL — HIGH (ref 70–99)
GLUCOSE BLDC GLUCOMTR-MCNC: 306 MG/DL — HIGH (ref 70–99)
GLUCOSE BLDC GLUCOMTR-MCNC: 345 MG/DL — HIGH (ref 70–99)
GLUCOSE BLDC GLUCOMTR-MCNC: 89 MG/DL — SIGNIFICANT CHANGE UP (ref 70–99)
GLUCOSE SERPL-MCNC: 328 MG/DL — HIGH (ref 70–99)
GLUCOSE UR QL: >=1000 MG/DL
HCT VFR BLD CALC: 36.6 % — LOW (ref 37–47)
HGB BLD-MCNC: 11 G/DL — LOW (ref 12–16)
IMM GRANULOCYTES NFR BLD AUTO: 1.5 % — HIGH (ref 0.1–0.3)
KETONES UR-MCNC: 40 MG/DL
LEUKOCYTE ESTERASE UR-ACNC: ABNORMAL
LYMPHOCYTES # BLD AUTO: 29.8 % — SIGNIFICANT CHANGE UP (ref 20.5–51.1)
LYMPHOCYTES # BLD AUTO: 3.37 K/UL — SIGNIFICANT CHANGE UP (ref 1.2–3.4)
MAGNESIUM SERPL-MCNC: 2 MG/DL — SIGNIFICANT CHANGE UP (ref 1.8–2.4)
MCHC RBC-ENTMCNC: 23.5 PG — LOW (ref 27–31)
MCHC RBC-ENTMCNC: 30.1 G/DL — LOW (ref 32–37)
MCV RBC AUTO: 78 FL — LOW (ref 81–99)
MONOCYTES # BLD AUTO: 0.81 K/UL — HIGH (ref 0.1–0.6)
MONOCYTES NFR BLD AUTO: 7.2 % — SIGNIFICANT CHANGE UP (ref 1.7–9.3)
NEUTROPHILS # BLD AUTO: 6.63 K/UL — HIGH (ref 1.4–6.5)
NEUTROPHILS NFR BLD AUTO: 58.7 % — SIGNIFICANT CHANGE UP (ref 42.2–75.2)
NITRITE UR-MCNC: NEGATIVE — SIGNIFICANT CHANGE UP
NRBC # BLD: 0 /100 WBCS — SIGNIFICANT CHANGE UP (ref 0–0)
PH UR: 5.5 — SIGNIFICANT CHANGE UP (ref 5–8)
PHOSPHATE SERPL-MCNC: 3.8 MG/DL — SIGNIFICANT CHANGE UP (ref 2.1–4.9)
PLATELET # BLD AUTO: 557 K/UL — HIGH (ref 130–400)
PMV BLD: 10.1 FL — SIGNIFICANT CHANGE UP (ref 7.4–10.4)
POTASSIUM SERPL-MCNC: 5.3 MMOL/L — HIGH (ref 3.5–5)
POTASSIUM SERPL-SCNC: 5.3 MMOL/L — HIGH (ref 3.5–5)
PROT SERPL-MCNC: 7 G/DL — SIGNIFICANT CHANGE UP (ref 6–8)
PROT UR-MCNC: NEGATIVE MG/DL — SIGNIFICANT CHANGE UP
RBC # BLD: 4.69 M/UL — SIGNIFICANT CHANGE UP (ref 4.2–5.4)
RBC # FLD: 16.3 % — HIGH (ref 11.5–14.5)
RBC CASTS # UR COMP ASSIST: 1 /HPF — SIGNIFICANT CHANGE UP (ref 0–4)
SODIUM SERPL-SCNC: 133 MMOL/L — LOW (ref 135–146)
SP GR SPEC: >1.03 — HIGH (ref 1–1.03)
SQUAMOUS # UR AUTO: 11 /HPF — HIGH (ref 0–5)
UROBILINOGEN FLD QL: 0.2 MG/DL — SIGNIFICANT CHANGE UP (ref 0.2–1)
WBC # BLD: 11.3 K/UL — HIGH (ref 4.8–10.8)
WBC # FLD AUTO: 11.3 K/UL — HIGH (ref 4.8–10.8)
WBC UR QL: 32 /HPF — HIGH (ref 0–5)

## 2024-11-11 PROCEDURE — 99232 SBSQ HOSP IP/OBS MODERATE 35: CPT

## 2024-11-11 RX ORDER — INSULIN LISPRO 100/ML
16 VIAL (ML) SUBCUTANEOUS
Qty: 16 | Refills: 0
Start: 2024-11-11 | End: 2024-12-10

## 2024-11-11 RX ORDER — BENZOCAINE .06; .7 ML/1; ML/1
0 SWAB TOPICAL
Qty: 120 | Refills: 1
Start: 2024-11-11

## 2024-11-11 RX ORDER — GLUCAGON INJECTION, SOLUTION 1 MG/.2ML
1 INJECTION, SOLUTION SUBCUTANEOUS
Qty: 15 | Refills: 0
Start: 2024-11-11

## 2024-11-11 RX ORDER — INSULIN GLARGINE,HUM.REC.ANLOG 100/ML
60 VIAL (ML) SUBCUTANEOUS
Qty: 12 | Refills: 0
Start: 2024-11-11 | End: 2024-12-10

## 2024-11-11 RX ORDER — CLOTRIMAZOLE 10 MG/G
1 CREAM TOPICAL
Qty: 1 | Refills: 0
Start: 2024-11-11

## 2024-11-11 RX ORDER — INSULIN LISPRO 100/ML
300 VIAL (ML) SUBCUTANEOUS ONCE
Refills: 0 | Status: DISCONTINUED | OUTPATIENT
Start: 2024-11-11 | End: 2024-11-13

## 2024-11-11 RX ORDER — INSULIN LISPRO 100/ML
18 VIAL (ML) SUBCUTANEOUS
Qty: 16 | Refills: 0
Start: 2024-11-11 | End: 2024-12-10

## 2024-11-11 RX ORDER — INSULIN LISPRO 100/ML
300 VIAL (ML) SUBCUTANEOUS ONCE
Refills: 0 | Status: DISCONTINUED | OUTPATIENT
Start: 2024-11-11 | End: 2024-11-11

## 2024-11-11 RX ADMIN — Medication 25 MILLIGRAM(S): at 06:07

## 2024-11-11 RX ADMIN — PANTOPRAZOLE SODIUM 40 MILLIGRAM(S): 40 TABLET, DELAYED RELEASE ORAL at 06:07

## 2024-11-11 RX ADMIN — FAMOTIDINE 40 MILLIGRAM(S): 10 INJECTION INTRAVENOUS at 14:08

## 2024-11-11 RX ADMIN — GUAIFENESIN 200 MILLIGRAM(S): 100 LIQUID ORAL at 10:35

## 2024-11-11 RX ADMIN — IPRATROPIUM BROMIDE AND ALBUTEROL SULFATE 3 MILLILITER(S): .5; 2.5 SOLUTION RESPIRATORY (INHALATION) at 10:36

## 2024-11-11 RX ADMIN — Medication 40 MILLIGRAM(S): at 22:06

## 2024-11-11 RX ADMIN — Medication 16 UNIT(S): at 08:31

## 2024-11-11 RX ADMIN — Medication 6: at 08:30

## 2024-11-11 RX ADMIN — Medication 25 MILLIGRAM(S): at 21:21

## 2024-11-11 RX ADMIN — Medication 5 MILLIGRAM(S): at 06:07

## 2024-11-11 RX ADMIN — CHLORHEXIDINE GLUCONATE 1 APPLICATION(S): 40 SOLUTION TOPICAL at 06:07

## 2024-11-11 RX ADMIN — Medication 25 MILLIGRAM(S): at 14:09

## 2024-11-11 RX ADMIN — CLOTRIMAZOLE 1 APPLICATORFUL: 10 CREAM TOPICAL at 21:21

## 2024-11-11 RX ADMIN — SERTRALINE HYDROCHLORIDE 25 MILLIGRAM(S): 50 TABLET, FILM COATED ORAL at 14:09

## 2024-11-11 NOTE — PROGRESS NOTE ADULT - ASSESSMENT
40 yo F with hx of DM I (previously on insulin pump), HTN, GERD and Anxiety/Depression presented to ED for hyperglycemia after her insulin pump was displaced.    #Hyperglycemia 2/2 DM II with malfunction insulin pump  - No evidence of DKA currently.   - c/w Insulin regimen for now.   - Monitor FS closely.   - HbA1c 10   - c/w IVF gentle hydration for now.   -Endo recs appreciated - increase Lantus to 50 units q5pm and Lispro to 16 units TIDAC.  - FSG today was ***[phone], endocrine consulted to determine whether her insulin regimen needs to be adjusted.    #Vaginal itching and pain  Fluconazole 150 x 1   miconazole 2% cream  UA       #Acute bronchitis   - CXR unremarkable.   - s/p decadron 10mg and albuterol nebs given in ED.  - Currently no wheezing on my exam --> hold off steroid.   - c/w albuterol prn   - will give Z-pack     #HTN - c/w home med.   #GERD - PPI   #Anxiety/Depression - c/w home med.      #DVT ppx: Lovenox SC    #pending: pt unable to return to her adult home until Tuesday 11/12. Monitor FSG and adjust insulin    38 yo F with hx of DM I (previously on insulin pump), HTN, GERD and Anxiety/Depression presented to ED for hyperglycemia after her insulin pump was displaced.    #Hyperglycemia 2/2 DM II with malfunction insulin pump  - No evidence of DKA currently.   - c/w Insulin regimen for now.   - Monitor FS closely.   - HbA1c 10   - c/w IVF gentle hydration for now.   -Endo recs appreciated - increase Lantus to 50 units q5pm and Lispro to 16 units TIDAC.  - FSG this AM were 297, 288, and 306. Endocrine consulted to determine whether her insulin regimen needs to be adjusted.    #Vaginal itching and pain  Fluconazole 150 x 1   miconazole 2% cream  UA       #Acute bronchitis   - CXR unremarkable.   - s/p decadron 10mg and albuterol nebs given in ED.  - Currently no wheezing on my exam --> hold off steroid.   - c/w albuterol prn   - will give Z-pack     #HTN - c/w home med.   #GERD - PPI   #Anxiety/Depression - c/w home med.      #DVT ppx: Lovenox SC    #pending: pt unable to return to her adult home until Tuesday 11/12. Monitor FSG and adjust insulin    38 yo F with hx of DM I (previously on insulin pump), HTN, GERD and Anxiety/Depression presented to ED for hyperglycemia after her insulin pump was displaced.    #Hyperglycemia 2/2 DM II with malfunction insulin pump  - No evidence of DKA currently.   - c/w Insulin regimen for now.   - Monitor FS closely.   - HbA1c 10   - c/w IVF gentle hydration for now.   -Endo recs from 11/10 appreciated - Lantus increased to 50 units q5pm and Lispro increased to 16 units TIDAC.  - FSG this AM were 297, 288, and 306. Endocrine consulted to determine whether her insulin regimen needs to be adjusted.    #Vaginal itching and pain  Fluconazole 150 x 1   miconazole 2% cream  UA       #Acute bronchitis   - CXR unremarkable.   - s/p decadron 10mg and albuterol nebs given in ED.  - 2 days IV Z-pack completed on 11/9  - c/w albuterol prn     #HTN - c/w home med.   #GERD - PPI   #Anxiety/Depression - c/w home med.      #DVT ppx: Lovenox SC    #pending: pt unable to return to her adult home until Tuesday 11/12. Monitor FSG and adjust insulin    38 yo F with hx of DM I (previously on insulin pump), HTN, GERD and Anxiety/Depression presented to ED for hyperglycemia after her insulin pump was disconnected.    #Hyperglycemia 2/2 DM II with malfunction insulin pump  - No evidence of DKA currently.   - c/w Insulin regimen for now.   - Monitor FS closely.   - HbA1c 10   - c/w IVF gentle hydration for now.   - Endo recs from 11/10 appreciated - Lantus increased to 50 units q5pm and Lispro increased to 16 units TIDAC.  - FSG this AM were 297, 288, and 306. Endocrine consulted to determine whether her insulin regimen needs to be adjusted.    #Vaginal itching and pain  Fluconazole 150 x 1   miconazole 2% cream  UA       #Acute bronchitis   - CXR unremarkable.   - s/p decadron 10mg and albuterol nebs given in ED.  - 2 days IV Z-pack completed on 11/9  - c/w albuterol prn     #HTN - c/w home med.   #GERD - PPI   #Anxiety/Depression - c/w home med.      #DVT ppx: Lovenox SC    #pending: pt unable to return to her adult home until Tuesday 11/12. Monitor FSG and adjust insulin    38 yo F with hx of DM I (previously on insulin pump), HTN, GERD and Anxiety/Depression presented to ED for hyperglycemia after her insulin pump was disconnected.    #Hyperglycemia 2/2 DM II with malfunction insulin pump  - No evidence of DKA currently.   - c/w Insulin regimen for now.   - Monitor FS closely.   - HbA1c 10   - c/w IVF gentle hydration for now.   - Endo recs from 11/10 appreciated - Lantus increased to 50 units q5pm and Lispro increased to 16 units TIDAC.  - FSG today have continued to increase 297 --> 345. Insulin pump restarted 2PM 11/11    #Vaginal itching and pain  - Fluconazole 150 x 1   - miconazole 2% cream  - UA     #Acute bronchitis   - CXR unremarkable.   - s/p decadron 10mg and albuterol nebs given in ED.  - 2 days IV Z-pack completed on 11/9  - c/w albuterol prn     #HTN - c/w home med.   #GERD - PPI   #Anxiety/Depression - c/w home med.      #DVT ppx: Lovenox SC    #pending: pt unable to return to her adult home until Tuesday 11/12. Monitor FSG and adjust insulin

## 2024-11-11 NOTE — PROGRESS NOTE ADULT - ASSESSMENT
40 yo F with hx of DM I (on insulin pump), HTN, GERD and Anxiety/Depression presents to ED for hyperglycemia.    Hyperglycemia 2/2 DM II with malfunction insulin pump  - No evidence of DKA currently.   - c/w Insulin regimen for now.   - Monitor FS closely.   - HbA1c 10   - c/w IVF gentle hydration for now.   -Endo recs appreciated - after discussion with Dr. Galvez - pump restarted - monitor FSG     Vaginal itching and pain  Fluconazole 150 x 1   miconazole 2% cream  UA   f/u Gyn c/s       Acute bronchitis   - CXR unremarkable.   - s/p decadron 10mg and albuterol nebs given in ED.  - Currently no wheezing on my exam --> hold off steroid.   - c/w albuterol prn   - Z-pack completed     HTN - c/w home med.   GERD - PPI   Anxiety/Depression - c/w home med.      DVT ppx: Lovenox SC    Housestaff d/w Endo    pending: Gyn c/s, UA

## 2024-11-11 NOTE — PROGRESS NOTE ADULT - SUBJECTIVE AND OBJECTIVE BOX
SUBJECTIVE/OVERNIGHT EVENTS  Today is hospital day 4d. This morning patient was seen and examined at bedside, resting in bed. No acute or major events overnight. Pt seemed upset and would not answer questions on today's evaluation.    MEDICATIONS  STANDING MEDICATIONS  albuterol/ipratropium for Nebulization 3 milliLiter(s) Nebulizer every 4 hours  amLODIPine   Tablet 5 milliGRAM(s) Oral daily  chlorhexidine 2% Cloths 1 Application(s) Topical <User Schedule>  clotrimazole 2% Vaginal Cream 1 Applicatorful Vaginal at bedtime  dextrose 5%. 1000 milliLiter(s) IV Continuous <Continuous>  dextrose 5%. 1000 milliLiter(s) IV Continuous <Continuous>  dextrose 50% Injectable 25 Gram(s) IV Push once  enoxaparin Injectable 40 milliGRAM(s) SubCutaneous every 24 hours  famotidine    Tablet 40 milliGRAM(s) Oral daily  glucagon  Injectable 1 milliGRAM(s) IntraMuscular once  insulin glargine Injectable (LANTUS) 50 Unit(s) SubCutaneous at bedtime  insulin lispro (ADMELOG) corrective regimen sliding scale   SubCutaneous at bedtime  insulin lispro (ADMELOG) corrective regimen sliding scale   SubCutaneous three times a day before meals  insulin lispro Injectable (ADMELOG) 16 Unit(s) SubCutaneous three times a day before meals  metoprolol tartrate 25 milliGRAM(s) Oral three times a day  pantoprazole    Tablet 40 milliGRAM(s) Oral before breakfast  sertraline 25 milliGRAM(s) Oral daily  sodium chloride 0.9%. 1000 milliLiter(s) IV Continuous <Continuous>    PRN MEDICATIONS  albuterol/ipratropium for Nebulization 3 milliLiter(s) Nebulizer every 6 hours PRN  dextrose Oral Gel 15 Gram(s) Oral once PRN  guaiFENesin Oral Liquid (Sugar-Free) 200 milliGRAM(s) Oral every 6 hours PRN    VITALS  T(F): 97.7 (11-11-24 @ 06:00), Max: 98.3 (11-10-24 @ 20:04)  HR: 81 (11-11-24 @ 06:00) (81 - 91)  BP: 129/82 (11-11-24 @ 06:00) (114/73 - 129/82)  RR: 18 (11-11-24 @ 06:00) (18 - 18)  SpO2: --  POCT Blood Glucose.: 306 mg/dL (11-11-24 @ 11:55)  POCT Blood Glucose.: 288 mg/dL (11-11-24 @ 08:27)  POCT Blood Glucose.: 297 mg/dL (11-11-24 @ 07:41)  POCT Blood Glucose.: 257 mg/dL (11-10-24 @ 23:18)  POCT Blood Glucose.: 157 mg/dL (11-10-24 @ 21:45)  POCT Blood Glucose.: 156 mg/dL (11-10-24 @ 16:54)    PHYSICAL EXAM  GENERAL  ( X ) NAD, lying in bed comfortably     (  ) obtunded     (  ) lethargic     (  ) somnolent    HEAD  ( X ) Atraumatic     (  ) hematoma     (  ) laceration (specify location:       )     NECK  ( X ) Supple     (  ) neck stiffness     (  ) nuchal rigidity     (  )  no JVD     (  ) JVD present ( -- cm)    HEART  Rate -->  (  ) normal rate    (  ) bradycardic    (  ) tachycardic  Rhythm -->  (  ) regular    (  ) regularly irregular    (  ) irregularly irregular  Murmurs -->  (  ) normal s1/s2    (  ) systolic murmur    (  ) diastolic murmur    (  ) continuous murmur     (  ) S3 present    (  ) S4 present    LUNGS  (  )Unlabored respirations     (  ) tachypnea  (  ) B/L air entry     (  ) decreased breath sounds in:  (location     )    (  ) no adventitious sound     (  ) crackles     (  ) wheezing      (  ) rhonchi      (specify location:       )  (  ) chest wall tenderness (specify location:       )    ABDOMEN  (  ) Soft     (  ) tense   |   (  ) nondistended     (  ) distended   |   (  ) +BS     (  ) hypoactive bowel sounds     (  ) hyperactive bowel sounds  (  ) nontender     (  ) RUQ tenderness     (  ) RLQ tenderness     (  ) LLQ tenderness     (  ) epigastric tenderness     (  ) diffuse tenderness  (  ) Splenomegaly      (  ) Hepatomegaly      (  ) Jaundice     (  ) ecchymosis     EXTREMITIES  ( X ) Normal     (  ) Rash     (  ) ecchymosis     (  ) varicose veins      (  ) pitting edema     (  ) non-pitting edema   (  ) ulceration     (  ) gangrene:     (location:     )    NERVOUS SYSTEM  (  ) A&Ox3     (  ) confused     (  ) lethargic  CN II-XII:     (  ) Intact     (  ) focal deficits  (Specify:     )   Upper extremities:     (  ) strength X/5     (  ) focal deficit (specify:    )  Lower extremities:     (  ) strength  X/5    (  ) focal deficit (specify:    )    SKIN  ( X ) No rashes or lesions on visible skin     (  ) maculopapular rash     (  ) pustules     (  ) vesicles     (  ) ulcer     (  ) ecchymosis     (specify location:     )        LABS             11.0   11.30 )-----------( 557      ( 11-11-24 @ 06:56 )             36.6     133  |  98  |  13  -------------------------<  328   11-11-24 @ 06:56  5.3  |  23  |  0.6    Ca      9.3     11-11-24 @ 06:56  Phos   3.8     11-11-24 @ 06:56  Mg     2.0     11-11-24 @ 06:56    TPro  7.0  /  Alb  3.7  /  TBili  <0.2  /  DBili  x   /  AST  22  /  ALT  46  /  AlkPhos  153  /  GGT  x     11-11-24 @ 06:56        Urinalysis Basic - ( 11 Nov 2024 06:56 )    Color: x / Appearance: x / SG: x / pH: x  Gluc: 328 mg/dL / Ketone: x  / Bili: x / Urobili: x   Blood: x / Protein: x / Nitrite: x   Leuk Esterase: x / RBC: x / WBC x   Sq Epi: x / Non Sq Epi: x / Bacteria: x   SUBJECTIVE/OVERNIGHT EVENTS  Today is hospital day 4d. This morning patient was seen and examined at bedside, resting in bed. No acute or major events overnight.    MEDICATIONS  STANDING MEDICATIONS  albuterol/ipratropium for Nebulization 3 milliLiter(s) Nebulizer every 4 hours  amLODIPine   Tablet 5 milliGRAM(s) Oral daily  chlorhexidine 2% Cloths 1 Application(s) Topical <User Schedule>  clotrimazole 2% Vaginal Cream 1 Applicatorful Vaginal at bedtime  dextrose 5%. 1000 milliLiter(s) IV Continuous <Continuous>  dextrose 5%. 1000 milliLiter(s) IV Continuous <Continuous>  dextrose 50% Injectable 25 Gram(s) IV Push once  enoxaparin Injectable 40 milliGRAM(s) SubCutaneous every 24 hours  famotidine    Tablet 40 milliGRAM(s) Oral daily  glucagon  Injectable 1 milliGRAM(s) IntraMuscular once  insulin glargine Injectable (LANTUS) 50 Unit(s) SubCutaneous at bedtime  insulin lispro (ADMELOG) corrective regimen sliding scale   SubCutaneous at bedtime  insulin lispro (ADMELOG) corrective regimen sliding scale   SubCutaneous three times a day before meals  insulin lispro Injectable (ADMELOG) 16 Unit(s) SubCutaneous three times a day before meals  metoprolol tartrate 25 milliGRAM(s) Oral three times a day  pantoprazole    Tablet 40 milliGRAM(s) Oral before breakfast  sertraline 25 milliGRAM(s) Oral daily  sodium chloride 0.9%. 1000 milliLiter(s) IV Continuous <Continuous>    PRN MEDICATIONS  albuterol/ipratropium for Nebulization 3 milliLiter(s) Nebulizer every 6 hours PRN  dextrose Oral Gel 15 Gram(s) Oral once PRN  guaiFENesin Oral Liquid (Sugar-Free) 200 milliGRAM(s) Oral every 6 hours PRN    VITALS  T(F): 97.7 (11-11-24 @ 06:00), Max: 98.3 (11-10-24 @ 20:04)  HR: 81 (11-11-24 @ 06:00) (81 - 91)  BP: 129/82 (11-11-24 @ 06:00) (114/73 - 129/82)  RR: 18 (11-11-24 @ 06:00) (18 - 18)  SpO2: --  POCT Blood Glucose.: 306 mg/dL (11-11-24 @ 11:55)  POCT Blood Glucose.: 288 mg/dL (11-11-24 @ 08:27)  POCT Blood Glucose.: 297 mg/dL (11-11-24 @ 07:41)  POCT Blood Glucose.: 257 mg/dL (11-10-24 @ 23:18)  POCT Blood Glucose.: 157 mg/dL (11-10-24 @ 21:45)  POCT Blood Glucose.: 156 mg/dL (11-10-24 @ 16:54)    PHYSICAL EXAM  GENERAL  ( X ) NAD, lying in bed comfortably     (  ) obtunded     (  ) lethargic     (  ) somnolent    HEAD  ( X ) Atraumatic     (  ) hematoma     (  ) laceration (specify location:       )     NECK  ( X ) Supple     (  ) neck stiffness     (  ) nuchal rigidity     (  )  no JVD     (  ) JVD present ( -- cm)    HEART  Rate -->  (  ) normal rate    (  ) bradycardic    (  ) tachycardic  Rhythm -->  (  ) regular    (  ) regularly irregular    (  ) irregularly irregular  Murmurs -->  (  ) normal s1/s2    (  ) systolic murmur    (  ) diastolic murmur    (  ) continuous murmur     (  ) S3 present    (  ) S4 present    LUNGS  ( X )Unlabored respirations     (  ) tachypnea  (  ) B/L air entry     (  ) decreased breath sounds in:  (location     )    (  ) no adventitious sound     (  ) crackles     (  ) wheezing      (  ) rhonchi      (specify location:       )  (  ) chest wall tenderness (specify location:       )    ABDOMEN  (  ) Soft     (  ) tense   |   (  ) nondistended     (  ) distended   |   (  ) +BS     (  ) hypoactive bowel sounds     (  ) hyperactive bowel sounds  (  ) nontender     (  ) RUQ tenderness     (  ) RLQ tenderness     (  ) LLQ tenderness     (  ) epigastric tenderness     (  ) diffuse tenderness  (  ) Splenomegaly      (  ) Hepatomegaly      (  ) Jaundice     (  ) ecchymosis     EXTREMITIES  ( X ) Normal     (  ) Rash     (  ) ecchymosis     (  ) varicose veins      (  ) pitting edema     (  ) non-pitting edema   (  ) ulceration     (  ) gangrene:     (location:     )    NERVOUS SYSTEM  ( X ) A&Ox3     (  ) confused     (  ) lethargic  CN II-XII:     (  ) Intact     (  ) focal deficits  (Specify:     )   Upper extremities:     (  ) strength X/5     (  ) focal deficit (specify:    )  Lower extremities:     (  ) strength  X/5    (  ) focal deficit (specify:    )    SKIN  ( X ) No rashes or lesions on visible skin     (  ) maculopapular rash     (  ) pustules     (  ) vesicles     (  ) ulcer     (  ) ecchymosis     (specify location:     )        LABS             11.0   11.30 )-----------( 557      ( 11-11-24 @ 06:56 )             36.6     133  |  98  |  13  -------------------------<  328   11-11-24 @ 06:56  5.3  |  23  |  0.6    Ca      9.3     11-11-24 @ 06:56  Phos   3.8     11-11-24 @ 06:56  Mg     2.0     11-11-24 @ 06:56    TPro  7.0  /  Alb  3.7  /  TBili  <0.2  /  DBili  x   /  AST  22  /  ALT  46  /  AlkPhos  153  /  GGT  x     11-11-24 @ 06:56        Urinalysis Basic - ( 11 Nov 2024 06:56 )    Color: x / Appearance: x / SG: x / pH: x  Gluc: 328 mg/dL / Ketone: x  / Bili: x / Urobili: x   Blood: x / Protein: x / Nitrite: x   Leuk Esterase: x / RBC: x / WBC x   Sq Epi: x / Non Sq Epi: x / Bacteria: x

## 2024-11-11 NOTE — PROGRESS NOTE ADULT - SUBJECTIVE AND OBJECTIVE BOX
CRISTI MONTGOMERY  39y, Female  Allergy: amoxicillin (Hives)  Fluad 0.5 ML ODETTE (Unknown)  Ceclor (Rash)  Clindamycin Phosphate (Unknown)  penicillins (Hives)  gabapentin (Other)  Influenza Virus Vaccine, H5N1, Inactivated (Other)  Cipro (Other)  clindamycin (Hives; Nephrotoxicity)    Hospital Day: 4d    Patient seen and examined earlier today.  No acute events overnight.     PMH/PSH:  PAST MEDICAL & SURGICAL HISTORY:  Neuropathy  right lower extremity, vaginal      Type 1 diabetes      Degenerative disc disease, thoracic      Urinary tract infection, recurrent      Gastroesophageal reflux disease, esophagitis presence not specified      Intractable headache, unspecified chronicity pattern, unspecified headache type      Major depressive disorder with single episode, in full remission  previously treated with zyprexa, celexa and lexapro      Tremor of right hand      Tachycardia      Spinal stenosis      No significant past surgical history          LAST 24-Hr EVENTS:    VITALS:  T(F): 98.1 (11-11-24 @ 12:20), Max: 98.3 (11-10-24 @ 20:04)  HR: 105 (11-11-24 @ 14:05)  BP: 143/85 (11-11-24 @ 14:05) (114/73 - 143/85)  RR: 18 (11-11-24 @ 12:20)  SpO2: --          TESTS & MEASUREMENTS:  Weight/BMI  87.6 (11-07-24 @ 22:45)  33.1 (11-07-24 @ 22:45)                          11.0   11.30 )-----------( 557      ( 11 Nov 2024 06:56 )             36.6         11-11    133[L]  |  98  |  13  ----------------------------<  328[H]  5.3[H]   |  23  |  0.6[L]    Ca    9.3      11 Nov 2024 06:56  Phos  3.8     11-11  Mg     2.0     11-11    TPro  7.0  /  Alb  3.7  /  TBili  <0.2  /  DBili  x   /  AST  22  /  ALT  46[H]  /  AlkPhos  153[H]  11-11    LIVER FUNCTIONS - ( 11 Nov 2024 06:56 )  Alb: 3.7 g/dL / Pro: 7.0 g/dL / ALK PHOS: 153 U/L / ALT: 46 U/L / AST: 22 U/L / GGT: x                 Urinalysis Basic - ( 11 Nov 2024 06:56 )    Color: x / Appearance: x / SG: x / pH: x  Gluc: 328 mg/dL / Ketone: x  / Bili: x / Urobili: x   Blood: x / Protein: x / Nitrite: x   Leuk Esterase: x / RBC: x / WBC x   Sq Epi: x / Non Sq Epi: x / Bacteria: x      Procalcitonin: 0.18 ng/mL (11-08-24 @ 00:44)                A1C with Estimated Average Glucose Result: 10.0 % (11-08-24 @ 06:35)  A1C with Estimated Average Glucose Result: 12.1 % (04-01-24 @ 07:30)          RADIOLOGY, ECG, & ADDITIONAL TESTS:  12 Lead ECG:   Ventricular Rate 88 BPM    Atrial Rate 88 BPM    P-R Interval 128 ms    QRS Duration 82 ms    Q-T Interval 396 ms    QTC Calculation(Bazett) 479 ms    P Axis 19 degrees    R Axis 18 degrees    T Axis 36 degrees    Diagnosis Line Normal sinus rhythm  Normal ECG    Confirmed by Umer Robertson ([id number]) on 11/7/2024 3:59:40 PM (11-07-24 @ 12:43)      RECENT DIAGNOSTIC ORDERS:      MEDICATIONS:  MEDICATIONS  (STANDING):  albuterol/ipratropium for Nebulization 3 milliLiter(s) Nebulizer every 4 hours  amLODIPine   Tablet 5 milliGRAM(s) Oral daily  chlorhexidine 2% Cloths 1 Application(s) Topical <User Schedule>  clotrimazole 2% Vaginal Cream 1 Applicatorful Vaginal at bedtime  dextrose 5%. 1000 milliLiter(s) (50 mL/Hr) IV Continuous <Continuous>  dextrose 5%. 1000 milliLiter(s) (100 mL/Hr) IV Continuous <Continuous>  dextrose 50% Injectable 25 Gram(s) IV Push once  enoxaparin Injectable 40 milliGRAM(s) SubCutaneous every 24 hours  famotidine    Tablet 40 milliGRAM(s) Oral daily  glucagon  Injectable 1 milliGRAM(s) IntraMuscular once  insulin lispro Injectable (ADMELOG). 300 Unit(s) SubCutaneous once  metoprolol tartrate 25 milliGRAM(s) Oral three times a day  pantoprazole    Tablet 40 milliGRAM(s) Oral before breakfast  sertraline 25 milliGRAM(s) Oral daily  sodium chloride 0.9%. 1000 milliLiter(s) (100 mL/Hr) IV Continuous <Continuous>    MEDICATIONS  (PRN):  albuterol/ipratropium for Nebulization 3 milliLiter(s) Nebulizer every 6 hours PRN Shortness of Breath and/or Wheezing  dextrose Oral Gel 15 Gram(s) Oral once PRN Blood Glucose LESS THAN 70 milliGRAM(s)/deciliter  guaiFENesin Oral Liquid (Sugar-Free) 200 milliGRAM(s) Oral every 6 hours PRN Cough      HOME MEDICATIONS:  alcohol swabs (11-11)  azelastine nasal (11-07)  clotrimazole 2% vaginal cream with applicator (11-11)  Dexcom G7  (11-11)  famotidine 40 mg oral tablet (11-07)  Freestyle Precision Carlos Strips (11-11)  Glucagon Emergency Kit for Low Blood Sugar 1 mg injection (11-11)  glucometer (per patient&#x27;s insurance) (11-11)  Insulin Pen Needles, 4mm (11-11)  lancets (11-11)  metoprolol tartrate 25 mg oral tablet (11-07)  Norvasc 5 mg oral tablet (11-07)  rimegepant 75 mg oral tablet, disintegrating (11-07)  sertraline 25 mg oral tablet (11-07)  test strips (per patient&#x27;s insurance) (11-11)      PHYSICAL EXAM:  Constitutional: WDWN, NAD  HEENT: sclera non-icteric, neck supple  Respiratory: Non labored respirations  Cardiovascular: RRR, normal S1S2, no M/R/G  Gastrointestinal: Soft Nondistended  Extremities: no edema, no clubbing  Neurological: AAOx3, CN Grossly intact  Skin: Normal temperature, warm, dry

## 2024-11-12 DIAGNOSIS — E10.9 TYPE 1 DIABETES MELLITUS WITHOUT COMPLICATIONS: ICD-10-CM

## 2024-11-12 DIAGNOSIS — K21.9 GASTRO-ESOPHAGEAL REFLUX DISEASE WITHOUT ESOPHAGITIS: ICD-10-CM

## 2024-11-12 DIAGNOSIS — I10 ESSENTIAL (PRIMARY) HYPERTENSION: ICD-10-CM

## 2024-11-12 DIAGNOSIS — J06.9 ACUTE UPPER RESPIRATORY INFECTION, UNSPECIFIED: ICD-10-CM

## 2024-11-12 DIAGNOSIS — F32.A DEPRESSION, UNSPECIFIED: ICD-10-CM

## 2024-11-12 DIAGNOSIS — E11.9 TYPE 2 DIABETES MELLITUS WITHOUT COMPLICATIONS: ICD-10-CM

## 2024-11-12 LAB
ALBUMIN SERPL ELPH-MCNC: 4 G/DL — SIGNIFICANT CHANGE UP (ref 3.5–5.2)
ALP SERPL-CCNC: 140 U/L — HIGH (ref 30–115)
ALT FLD-CCNC: 45 U/L — HIGH (ref 0–41)
ANION GAP SERPL CALC-SCNC: 12 MMOL/L — SIGNIFICANT CHANGE UP (ref 7–14)
AST SERPL-CCNC: 23 U/L — SIGNIFICANT CHANGE UP (ref 0–41)
BASOPHILS # BLD AUTO: 0.07 K/UL — SIGNIFICANT CHANGE UP (ref 0–0.2)
BASOPHILS NFR BLD AUTO: 0.6 % — SIGNIFICANT CHANGE UP (ref 0–1)
BILIRUB SERPL-MCNC: <0.2 MG/DL — SIGNIFICANT CHANGE UP (ref 0.2–1.2)
BUN SERPL-MCNC: 12 MG/DL — SIGNIFICANT CHANGE UP (ref 10–20)
CALCIUM SERPL-MCNC: 9.4 MG/DL — SIGNIFICANT CHANGE UP (ref 8.4–10.4)
CHLORIDE SERPL-SCNC: 99 MMOL/L — SIGNIFICANT CHANGE UP (ref 98–110)
CO2 SERPL-SCNC: 25 MMOL/L — SIGNIFICANT CHANGE UP (ref 17–32)
CREAT SERPL-MCNC: 0.6 MG/DL — LOW (ref 0.7–1.5)
EGFR: 117 ML/MIN/1.73M2 — SIGNIFICANT CHANGE UP
EOSINOPHIL # BLD AUTO: 0.23 K/UL — SIGNIFICANT CHANGE UP (ref 0–0.7)
EOSINOPHIL NFR BLD AUTO: 2.1 % — SIGNIFICANT CHANGE UP (ref 0–8)
GLUCOSE BLDC GLUCOMTR-MCNC: 147 MG/DL — HIGH (ref 70–99)
GLUCOSE BLDC GLUCOMTR-MCNC: 174 MG/DL — HIGH (ref 70–99)
GLUCOSE BLDC GLUCOMTR-MCNC: 209 MG/DL — HIGH (ref 70–99)
GLUCOSE BLDC GLUCOMTR-MCNC: 234 MG/DL — HIGH (ref 70–99)
GLUCOSE BLDC GLUCOMTR-MCNC: 61 MG/DL — LOW (ref 70–99)
GLUCOSE BLDC GLUCOMTR-MCNC: 83 MG/DL — SIGNIFICANT CHANGE UP (ref 70–99)
GLUCOSE SERPL-MCNC: 50 MG/DL — CRITICAL LOW (ref 70–99)
HCT VFR BLD CALC: 38.1 % — SIGNIFICANT CHANGE UP (ref 37–47)
HGB BLD-MCNC: 11.4 G/DL — LOW (ref 12–16)
IMM GRANULOCYTES NFR BLD AUTO: 1.5 % — HIGH (ref 0.1–0.3)
LYMPHOCYTES # BLD AUTO: 3.31 K/UL — SIGNIFICANT CHANGE UP (ref 1.2–3.4)
LYMPHOCYTES # BLD AUTO: 30.5 % — SIGNIFICANT CHANGE UP (ref 20.5–51.1)
MAGNESIUM SERPL-MCNC: 2 MG/DL — SIGNIFICANT CHANGE UP (ref 1.8–2.4)
MCHC RBC-ENTMCNC: 23.4 PG — LOW (ref 27–31)
MCHC RBC-ENTMCNC: 29.9 G/DL — LOW (ref 32–37)
MCV RBC AUTO: 78.1 FL — LOW (ref 81–99)
MONOCYTES # BLD AUTO: 0.68 K/UL — HIGH (ref 0.1–0.6)
MONOCYTES NFR BLD AUTO: 6.3 % — SIGNIFICANT CHANGE UP (ref 1.7–9.3)
NEUTROPHILS # BLD AUTO: 6.39 K/UL — SIGNIFICANT CHANGE UP (ref 1.4–6.5)
NEUTROPHILS NFR BLD AUTO: 59 % — SIGNIFICANT CHANGE UP (ref 42.2–75.2)
NRBC # BLD: 0 /100 WBCS — SIGNIFICANT CHANGE UP (ref 0–0)
PLATELET # BLD AUTO: 586 K/UL — HIGH (ref 130–400)
PMV BLD: 10.2 FL — SIGNIFICANT CHANGE UP (ref 7.4–10.4)
POTASSIUM SERPL-MCNC: 4.4 MMOL/L — SIGNIFICANT CHANGE UP (ref 3.5–5)
POTASSIUM SERPL-SCNC: 4.4 MMOL/L — SIGNIFICANT CHANGE UP (ref 3.5–5)
PROT SERPL-MCNC: 7.4 G/DL — SIGNIFICANT CHANGE UP (ref 6–8)
RBC # BLD: 4.88 M/UL — SIGNIFICANT CHANGE UP (ref 4.2–5.4)
RBC # FLD: 16.2 % — HIGH (ref 11.5–14.5)
SODIUM SERPL-SCNC: 136 MMOL/L — SIGNIFICANT CHANGE UP (ref 135–146)
WBC # BLD: 10.84 K/UL — HIGH (ref 4.8–10.8)
WBC # FLD AUTO: 10.84 K/UL — HIGH (ref 4.8–10.8)

## 2024-11-12 PROCEDURE — 99232 SBSQ HOSP IP/OBS MODERATE 35: CPT

## 2024-11-12 RX ORDER — TERCONAZOLE 80 MG/1
1 SUPPOSITORY VAGINAL
Qty: 1 | Refills: 1
Start: 2024-11-12 | End: 2024-11-25

## 2024-11-12 RX ORDER — CLOTRIMAZOLE AND BETAMETHASONE DIPROPIONATE 10; .5 MG/G; MG/G
1 CREAM TOPICAL
Refills: 0 | Status: DISCONTINUED | OUTPATIENT
Start: 2024-11-12 | End: 2024-11-12

## 2024-11-12 RX ORDER — BETAMETHASONE DIPROPIONATE 0.5 MG/G
1 OINTMENT TOPICAL EVERY 12 HOURS
Refills: 0 | Status: DISCONTINUED | OUTPATIENT
Start: 2024-11-12 | End: 2024-11-13

## 2024-11-12 RX ORDER — CLOTRIMAZOLE 10 MG/G
1 CREAM TOPICAL EVERY 12 HOURS
Refills: 0 | Status: DISCONTINUED | OUTPATIENT
Start: 2024-11-12 | End: 2024-11-13

## 2024-11-12 RX ORDER — CLOTRIMAZOLE 10 MG/G
1 CREAM TOPICAL DAILY
Refills: 0 | Status: DISCONTINUED | OUTPATIENT
Start: 2024-11-12 | End: 2024-11-12

## 2024-11-12 RX ORDER — INSULIN LISPRO 100/ML
18 VIAL (ML) SUBCUTANEOUS
Qty: 16 | Refills: 0
Start: 2024-11-12

## 2024-11-12 RX ORDER — INSULIN GLARGINE,HUM.REC.ANLOG 100/ML
60 VIAL (ML) SUBCUTANEOUS
Qty: 12 | Refills: 0
Start: 2024-11-12 | End: 2024-11-21

## 2024-11-12 RX ORDER — INSULIN GLARGINE,HUM.REC.ANLOG 100/ML
60 VIAL (ML) SUBCUTANEOUS
Qty: 2 | Refills: 1
Start: 2024-11-12 | End: 2024-12-01

## 2024-11-12 RX ORDER — BENZOCAINE .06; .7 ML/1; ML/1
0 SWAB TOPICAL
Qty: 120 | Refills: 1
Start: 2024-11-12

## 2024-11-12 RX ORDER — GLUCAGON INJECTION, SOLUTION 1 MG/.2ML
1 INJECTION, SOLUTION SUBCUTANEOUS
Qty: 15 | Refills: 0
Start: 2024-11-12

## 2024-11-12 RX ORDER — INSULIN LISPRO 100/ML
18 VIAL (ML) SUBCUTANEOUS
Qty: 16 | Refills: 0
Start: 2024-11-12 | End: 2024-11-18

## 2024-11-12 RX ORDER — CLOTRIMAZOLE AND BETAMETHASONE DIPROPIONATE 10; .5 MG/G; MG/G
1 CREAM TOPICAL
Qty: 1 | Refills: 1
Start: 2024-11-12 | End: 2024-12-01

## 2024-11-12 RX ADMIN — Medication 25 MILLIGRAM(S): at 22:19

## 2024-11-12 RX ADMIN — SERTRALINE HYDROCHLORIDE 25 MILLIGRAM(S): 50 TABLET, FILM COATED ORAL at 11:34

## 2024-11-12 RX ADMIN — BETAMETHASONE DIPROPIONATE 1 APPLICATION(S): 0.5 OINTMENT TOPICAL at 17:12

## 2024-11-12 RX ADMIN — PANTOPRAZOLE SODIUM 40 MILLIGRAM(S): 40 TABLET, DELAYED RELEASE ORAL at 06:53

## 2024-11-12 RX ADMIN — CLOTRIMAZOLE 1 APPLICATORFUL: 10 CREAM TOPICAL at 17:12

## 2024-11-12 RX ADMIN — Medication 40 MILLIGRAM(S): at 22:32

## 2024-11-12 RX ADMIN — Medication 25 MILLIGRAM(S): at 13:05

## 2024-11-12 RX ADMIN — CHLORHEXIDINE GLUCONATE 1 APPLICATION(S): 40 SOLUTION TOPICAL at 05:58

## 2024-11-12 RX ADMIN — FAMOTIDINE 40 MILLIGRAM(S): 10 INJECTION INTRAVENOUS at 11:34

## 2024-11-12 RX ADMIN — Medication 5 MILLIGRAM(S): at 05:51

## 2024-11-12 NOTE — PROGRESS NOTE ADULT - ASSESSMENT
38 yo F with hx of DM I (on insulin pump), HTN, GERD and Anxiety/Depression presents to ED for hyperglycemia.    Hyperglycemia 2/2 DM II with malfunction insulin pump  - No evidence of DKA currently.   - c/w Insulin regimen for now.   - Monitor FS closely.   - HbA1c 10   - c/w IVF gentle hydration for now.   -Endo recs appreciated - after discussion with Dr. Galvez - pump restarted - monitor FSG     Hematochezia  reported by patient  per nurse -stool was loose but no blood - monitor   Hb stable   HDS     Vaginal itching and pain  Fluconazole 150 x 1   miconazole 2% cream  UA   Gyn c/s:  Recommend terazol 7 for 7 days QD, internal application  - Recommend lotrisone BID for external genitalia  - Patient to follow up with Dr. Mueller in 2 weeks outpatient  - vaginitis performed, will f/u results        Acute bronchitis   - CXR unremarkable.   - s/p decadron 10mg and albuterol nebs given in ED.  - Currently no wheezing on my exam --> hold off steroid.   - c/w albuterol prn   - Z-pack completed     HTN - c/w home med.   GERD - PPI   Anxiety/Depression - c/w home med.      DVT ppx: Lovenox SC    d/w Endo    pending: monitor FSG, monitor stool

## 2024-11-12 NOTE — CONSULT NOTE ADULT - SUBJECTIVE AND OBJECTIVE BOX
Chief Complaint:     HPI: 40yo G0 PMH DM1 (on insulin) admitted for hyperglycemia w/ complaints of vaginal itching and burning s/p 1 dose fluconazole 150mg on 11/10 and daily clotrimazole vaginal application. GYN consulted for further recommendations. Patient reports continued itching and burning that is bothersome, unrelieved after treatment. She denies vaginal discharge, abdominal pain, heavy periods. Patient follows Dr. Mueller outpatient and per chart review, she has been treated for candidal infections regularly, last time was in July. Recurrent candidal infections likely secondary to poor glycemic control.     Ob/Gyn History:  G0            Denies history of ovarian cysts, uterine fibroids, abnormal paps, or STIs  Last Pap Smear - Aug 2018, NILM  Last Mammogram - 2024, BIRAD 1. Patient performing yearly mammograms as she is unsure of her family history as she is adoped    Denies the following: constitutional symptoms, visual symptoms, cardiovascular symptoms, respiratory symptoms, GI symptoms, musculoskeletal symptoms, skin symptoms, neurologic symptoms, hematologic symptoms, allergic symptoms, psychiatric symptoms  Except any pertinent positives listed.     Home Medications:  azelastine nasal: 2 spray(s) 2 times a day (2024 22:02)  famotidine 40 mg oral tablet: 1 tab(s) orally once a day (2024 22:02)  metoprolol tartrate 25 mg oral tablet: 1 tab(s) orally 3 times a day (2024 22:03)  Norvasc 5 mg oral tablet: 1 tab(s) orally once a day (2024 22:03)  rimegepant 75 mg oral tablet, disintegratin tab(s) orally once a day as needed for migraine (2024 22:03)  sertraline 25 mg oral tablet: 1 tab(s) orally once a day (2024 22:03)      Allergies  amoxicillin (Hives)  Fluad 0.5 ML ODETTE (Unknown)  Ceclor (Rash)  Clindamycin Phosphate (Unknown)  penicillins (Hives)  clindamycin (Hives; Nephrotoxicity)    Intolerances  gabapentin (Other)  Influenza Virus Vaccine, H5N1, Inactivated (Other)  Cipro (Other)    PAST MEDICAL & SURGICAL HISTORY:  Neuropathy  right lower extremity, vaginal  Type 1 diabetes  Degenerative disc disease, thoracic  Urinary tract infection, recurrent  Gastroesophageal reflux disease, esophagitis presence not specified  Intractable headache, unspecified chronicity pattern, unspecified headache type  Major depressive disorder with single episode, in full remission  previously treated with zyprexa, celexa and lexapro  Tremor of right hand  Tachycardia  Spinal stenosis  No significant past surgical history    FAMILY HISTORY: Unsure, pt is adopted.     SOCIAL HISTORY: Denies cigarette use, alcohol use, or illicit drug use. Lives with mother, feels safe at home.     Vital Signs Last 24 Hrs  T(F): 97.9 (2024 21:28), Max: 98.1 (2024 12:20)  HR: 99 (2024 21:28) (98 - 105)  BP: 117/74 (2024 21:28) (117/74 - 143/85)  RR: 18 (2024 21:28) (18 - 18)      General Appearance - AAOx3, NAD  Abdomen - Soft, nontender, nondistended, no rebound, no rigidity, no guarding, bowel sounds present  GYN/Pelvis: Limited due to patient discomfort    Spec: normal vaginal mucosa, white discharge present in the vaginal vault. Cervix visualized, no lesions. Vaginitis performed.   Bimanual exam limited due to patient's discomfort. Nontender uterus, normal size.     Meds:   albuterol/ipratropium for Nebulization. 3 milliLiter(s) Nebulizer once  albuterol/ipratropium for Nebulization. 3 milliLiter(s) Nebulizer once  albuterol/ipratropium for Nebulization. 3 milliLiter(s) Nebulizer once  azithromycin  IVPB 500 milliGRAM(s) IV Intermittent once  azithromycin  IVPB 500 milliGRAM(s) IV Intermittent every 24 hours  dexAMETHasone  IVPB 10 milliGRAM(s) IV Intermittent Once  fluconAZOLE   Tablet 150 milliGRAM(s) Oral once  insulin glargine Injectable (LANTUS) 40 Unit(s) SubCutaneous once  insulin glargine Injectable (LANTUS) 10 Unit(s) SubCutaneous once  insulin lispro Injectable (ADMELOG). 15 Unit(s) SubCutaneous once  insulin lispro Injectable (ADMELOG). 10 Unit(s) SubCutaneous once  insulin regular  human recombinant 8 Unit(s) IV Push Once  insulin regular  human recombinant. 10 Unit(s) SubCutaneous once  insulin regular  human recombinant. 10 Unit(s) IV Push once  lactated ringers Bolus 1000 milliLiter(s) IV Bolus once  lactated ringers Bolus 1000 milliLiter(s) IV Bolus once  magnesium sulfate  IVPB 2 Gram(s) IV Intermittent once  magnesium sulfate  IVPB 1 Gram(s) IV Intermittent once  sodium zirconium cyclosilicate 5 Gram(s) Oral once        LABS:                        11.0    )-----------( 557      ( 2024 06:56 )             36.6             133[L]  |  98  |  13  ----------------------------<  328[H]  5.3[H]   |  23  |  0.6[L]    Ca    9.3      2024 06:56  Phos  3.8       Mg     2.0         TPro  7.0  /  Alb  3.7  /  TBili  <0.2  /  DBili  x   /  AST  22  /  ALT  46[H]  /  AlkPhos  153[H]        Urinalysis Basic - ( 2024 17:30 )    Color: Yellow / Appearance: Cloudy / SG: >1.030 / pH: x  Gluc: x / Ketone: 40 mg/dL  / Bili: Negative / Urobili: 0.2 mg/dL   Blood: x / Protein: Negative mg/dL / Nitrite: Negative   Leuk Esterase: Trace / RBC: 1 /HPF / WBC 32 /HPF   Sq Epi: x / Non Sq Epi: 11 /HPF / Bacteria: Few /HPF          RADIOLOGY & ADDITIONAL STUDIES: None  
ENDOCRINE INITIAL CONSULT -     HPI:  39 year old female with PMH of:  - DM1 (insulin pump)  - depression  - anxiety  - asthma   - GERD   - HTN   - obesity     presenting for hyperglycemia.   Patient reports that today she disconnected the insulin pump to take a shower then was not able to re-connect it. Even the nurse at the assisted living tried to help but was not able to connect it as well.   her fingerstick glucose was >500 so she presented to the ED     Additionally, patient reports that 10 days ago she had a viral illness with fever and cough and was treated symptomatically; but she's still having a productive cough (greenish) without fever or other upper resp symptoms.     Patient denies chest pain, dizziness, nausea, vomiting, abdominal pain, dysuria, or additional concerns.      CXR: No radiographic evidence of acute cardiopulmonary disease.    WBC 13K  Hgb 10.6 (baseline 11.7)    Plt 519  Na 133  K+ 5.3  Bicarb 24   Gap 13   Creat 0.6  lactate 1.6  pH 7.38 (VBG)    Urine: no ketones / gravity >1030 // Glucose > 1000  // negative for infection   COVID/ FLU / RSV: negative    ECG: Nl sinus    Received 2 L LR and IV insulin in the ED  (2024 21:02)      ENDOCRINE HISTORY     Endocrinologist:  Social History:  Family History:  Surgical History:  Insurance:  Resides In:    PAST MEDICAL & SURGICAL HISTORY:  Neuropathy  right lower extremity, vaginal      Type 1 diabetes      Degenerative disc disease, thoracic      Urinary tract infection, recurrent      Gastroesophageal reflux disease, esophagitis presence not specified      Intractable headache, unspecified chronicity pattern, unspecified headache type      Major depressive disorder with single episode, in full remission  previously treated with zyprexa, celexa and lexapro      Tremor of right hand      Tachycardia      Spinal stenosis      No significant past surgical history          FAMILY HISTORY:  No pertinent family history in first degree relatives        Social History:      Home Medications:  azelastine nasal: 2 spray(s) 2 times a day (2024 22:02)  famotidine 40 mg oral tablet: 1 tab(s) orally once a day (2024 22:02)  metoprolol tartrate 25 mg oral tablet: 1 tab(s) orally 3 times a day (2024 22:03)  Norvasc 5 mg oral tablet: 1 tab(s) orally once a day (2024 22:03)  omeprazole 40 mg oral delayed release capsule: 1 cap(s) orally once a day (2024 22:03)  rimegepant 75 mg oral tablet, disintegratin tab(s) orally once a day as needed for migraine (2024 22:03)  sertraline 25 mg oral tablet: 1 tab(s) orally once a day (2024 22:03)      MEDICATIONS  (STANDING):  albuterol/ipratropium for Nebulization 3 milliLiter(s) Nebulizer every 4 hours  amLODIPine   Tablet 5 milliGRAM(s) Oral daily  azithromycin  IVPB 500 milliGRAM(s) IV Intermittent every 24 hours  chlorhexidine 2% Cloths 1 Application(s) Topical <User Schedule>  dextrose 5%. 1000 milliLiter(s) (50 mL/Hr) IV Continuous <Continuous>  dextrose 5%. 1000 milliLiter(s) (100 mL/Hr) IV Continuous <Continuous>  dextrose 50% Injectable 25 Gram(s) IV Push once  enoxaparin Injectable 40 milliGRAM(s) SubCutaneous every 24 hours  famotidine    Tablet 40 milliGRAM(s) Oral daily  glucagon  Injectable 1 milliGRAM(s) IntraMuscular once  insulin glargine Injectable (LANTUS) 41 Unit(s) SubCutaneous at bedtime  insulin lispro (ADMELOG) corrective regimen sliding scale   SubCutaneous at bedtime  insulin lispro (ADMELOG) corrective regimen sliding scale   SubCutaneous three times a day before meals  insulin lispro Injectable (ADMELOG) 12 Unit(s) SubCutaneous three times a day before meals  metoprolol tartrate 25 milliGRAM(s) Oral three times a day  pantoprazole    Tablet 40 milliGRAM(s) Oral before breakfast  sertraline 25 milliGRAM(s) Oral daily  sodium chloride 0.9%. 1000 milliLiter(s) (100 mL/Hr) IV Continuous <Continuous>    MEDICATIONS  (PRN):  albuterol/ipratropium for Nebulization 3 milliLiter(s) Nebulizer every 6 hours PRN Shortness of Breath and/or Wheezing  dextrose Oral Gel 15 Gram(s) Oral once PRN Blood Glucose LESS THAN 70 milliGRAM(s)/deciliter  guaiFENesin Oral Liquid (Sugar-Free) 200 milliGRAM(s) Oral every 6 hours PRN Cough      Allergies    amoxicillin (Hives)  Fluad 0.5 ML ODETTE (Unknown)  Ceclor (Rash)  Clindamycin Phosphate (Unknown)  penicillins (Hives)  clindamycin (Hives; Nephrotoxicity)    Intolerances    gabapentin (Other)  Influenza Virus Vaccine, H5N1, Inactivated (Other)  Cipro (Other)      REVIEW OF SYSTEMS  Constitutional: No fever  Eyes: No blurry vision  Neuro: No tremors  HEENT: No pain  Cardiovascular: No chest pain, palpitations  Respiratory: No SOB  GI: No nausea, vomiting, abdominal pain  Endocrine: no polyuria, polydipsia        PHYSICAL EXAM   Vital Signs Last 24 Hrs  T(C): 36.3 (2024 05:50), Max: 36.7 (2024 16:59)  T(F): 97.3 (2024 05:50), Max: 98 (2024 16:59)  HR: 94 (2024 05:50) (86 - 106)  BP: 111/70 (2024 05:50) (111/70 - 143/80)  BP(mean): 101 (2024 19:53) (99 - 101)  RR: 18 (2024 05:50) (18 - 18)  SpO2: 98% (2024 05:50) (93% - 100%)    Parameters below as of 2024 05:50  Patient On (Oxygen Delivery Method): room air      GENERAL: NAD, well-groomed, well-developed  RESPIRATORY: GBAE  CARDIOVASCULAR: Regular rate and rhythm  GI: Soft, nontender, non distended, normal bowel sounds      CAPILLARY BLOOD GLUCOSE  275 (2024 05:50)      POCT Blood Glucose.: 299 mg/dL (2024 11:25)  POCT Blood Glucose.: 240 mg/dL (2024 08:13)  POCT Blood Glucose.: 275 mg/dL (2024 06:04)  POCT Blood Glucose.: 379 mg/dL (2024 00:06)  POCT Blood Glucose.: 474 mg/dL (2024 22:20)  POCT Blood Glucose.: >600 mg/dL (2024 20:57)  POCT Blood Glucose.: >600 mg/dL (2024 20:54)  POCT Blood Glucose.: 517 mg/dL (2024 19:18)  POCT Blood Glucose.: 423 mg/dL (2024 18:02)  POCT Blood Glucose.: 327 mg/dL (2024 15:47)  POCT Blood Glucose.: 405 mg/dL (2024 14:09)      A1C with Estimated Average Glucose Result: 10.0 % (24 @ 06:35)                            10.4   14.91 )-----------( 567      ( 2024 06:35 )             34.6           140  |  102  |  12  ----------------------------<  313[H]  5.4[H]   |  24  |  0.5[L]    Ca    9.5      2024 06:35  Phos  4.3       Mg     2.2         TPro  7.2  /  Alb  4.0  /  TBili  0.3  /  DBili  x   /  AST  21  /  ALT  53[H]  /  AlkPhos  160[H]            LIPIDS    RADIOLOGY

## 2024-11-12 NOTE — CONSULT NOTE ADULT - ASSESSMENT
THIS NOTE IS INCOMPLETE 40yo G0 PMH DM1 (on insulin) admitted for hyperglycemia with vaginal itching and burning, likely yeast infection    - Recommend terazol 7 for 7 days QD, internal application  - Recommend lotrisone BID for external genitalia  - Patient to follow up with Dr. Mueller in 2 weeks outpatient  - vaginitis performed, will f/u results  - Management per PMD    D/w Dr. Mueller and Dr. Rodriguez

## 2024-11-12 NOTE — PROGRESS NOTE ADULT - SUBJECTIVE AND OBJECTIVE BOX
CRISTI MONTGOMERY  39y, Female  Allergy: amoxicillin (Hives)  Fluad 0.5 ML ODETTE (Unknown)  Ceclor (Rash)  Clindamycin Phosphate (Unknown)  penicillins (Hives)  gabapentin (Other)  Influenza Virus Vaccine, H5N1, Inactivated (Other)  Cipro (Other)  clindamycin (Hives; Nephrotoxicity)    Hospital Day: 5d    Patient seen and examined earlier today. pt reports having blood in stool this AM.     PMH/PSH:  PAST MEDICAL & SURGICAL HISTORY:  Neuropathy  right lower extremity, vaginal      Type 1 diabetes      Degenerative disc disease, thoracic      Urinary tract infection, recurrent      Gastroesophageal reflux disease, esophagitis presence not specified      Intractable headache, unspecified chronicity pattern, unspecified headache type      Major depressive disorder with single episode, in full remission  previously treated with zyprexa, celexa and lexapro      Tremor of right hand      Tachycardia      Spinal stenosis      No significant past surgical history          LAST 24-Hr EVENTS:    VITALS:  T(F): 98 (11-12-24 @ 13:21), Max: 98 (11-12-24 @ 13:21)  HR: 112 (11-12-24 @ 12:53)  BP: 162/81 (11-12-24 @ 12:53) (117/74 - 162/81)  RR: 18 (11-11-24 @ 21:28)  SpO2: 98% (11-11-24 @ 21:28)          TESTS & MEASUREMENTS:  Weight/BMI  87.6 (11-07-24 @ 22:45)  33.1 (11-07-24 @ 22:45)                          11.4   10.84 )-----------( 586      ( 12 Nov 2024 06:56 )             38.1         11-12    136  |  99  |  12  ----------------------------<  50[LL]  4.4   |  25  |  0.6[L]    Ca    9.4      12 Nov 2024 06:56  Phos  3.8     11-11  Mg     2.0     11-12    TPro  7.4  /  Alb  4.0  /  TBili  <0.2  /  DBili  x   /  AST  23  /  ALT  45[H]  /  AlkPhos  140[H]  11-12    LIVER FUNCTIONS - ( 12 Nov 2024 06:56 )  Alb: 4.0 g/dL / Pro: 7.4 g/dL / ALK PHOS: 140 U/L / ALT: 45 U/L / AST: 23 U/L / GGT: x                 Urinalysis Basic - ( 12 Nov 2024 06:56 )    Color: x / Appearance: x / SG: x / pH: x  Gluc: 50 mg/dL / Ketone: x  / Bili: x / Urobili: x   Blood: x / Protein: x / Nitrite: x   Leuk Esterase: x / RBC: x / WBC x   Sq Epi: x / Non Sq Epi: x / Bacteria: x      Procalcitonin: 0.18 ng/mL (11-08-24 @ 00:44)                A1C with Estimated Average Glucose Result: 10.0 % (11-08-24 @ 06:35)  A1C with Estimated Average Glucose Result: 12.1 % (04-01-24 @ 07:30)          RADIOLOGY, ECG, & ADDITIONAL TESTS:  12 Lead ECG:   Ventricular Rate 88 BPM    Atrial Rate 88 BPM    P-R Interval 128 ms    QRS Duration 82 ms    Q-T Interval 396 ms    QTC Calculation(Bazett) 479 ms    P Axis 19 degrees    R Axis 18 degrees    T Axis 36 degrees    Diagnosis Line Normal sinus rhythm  Normal ECG    Confirmed by Umer Robertson ([id number]) on 11/7/2024 3:59:40 PM (11-07-24 @ 12:43)      RECENT DIAGNOSTIC ORDERS:  Magnesium: AM Sched. Collection: 13-Nov-2024 04:30 (11-12-24 @ 15:38)  Comprehensive Metabolic Panel: AM Sched. Collection: 13-Nov-2024 04:30 (11-12-24 @ 15:38)  Complete Blood Count + Automated Diff: AM Sched. Collection: 13-Nov-2024 04:30 (11-12-24 @ 15:38)  Vaginitis Plus with Candida: Routine  Specimen Source: Vaginal (11-12-24 @ 07:41)      MEDICATIONS:  MEDICATIONS  (STANDING):  albuterol/ipratropium for Nebulization 3 milliLiter(s) Nebulizer every 4 hours  amLODIPine   Tablet 5 milliGRAM(s) Oral daily  chlorhexidine 2% Cloths 1 Application(s) Topical <User Schedule>  clotrimazole 2% Vaginal Cream 1 Applicatorful Vaginal at bedtime  dextrose 5%. 1000 milliLiter(s) (50 mL/Hr) IV Continuous <Continuous>  dextrose 5%. 1000 milliLiter(s) (100 mL/Hr) IV Continuous <Continuous>  dextrose 50% Injectable 25 Gram(s) IV Push once  enoxaparin Injectable 40 milliGRAM(s) SubCutaneous every 24 hours  famotidine    Tablet 40 milliGRAM(s) Oral daily  glucagon  Injectable 1 milliGRAM(s) IntraMuscular once  insulin lispro Injectable (ADMELOG). 300 Unit(s) SubCutaneous once  metoprolol tartrate 25 milliGRAM(s) Oral three times a day  pantoprazole    Tablet 40 milliGRAM(s) Oral before breakfast  sertraline 25 milliGRAM(s) Oral daily  sodium chloride 0.9%. 1000 milliLiter(s) (100 mL/Hr) IV Continuous <Continuous>    MEDICATIONS  (PRN):  albuterol/ipratropium for Nebulization 3 milliLiter(s) Nebulizer every 6 hours PRN Shortness of Breath and/or Wheezing  dextrose Oral Gel 15 Gram(s) Oral once PRN Blood Glucose LESS THAN 70 milliGRAM(s)/deciliter  guaiFENesin Oral Liquid (Sugar-Free) 200 milliGRAM(s) Oral every 6 hours PRN Cough      HOME MEDICATIONS:  alcohol swabs (11-12)  azelastine nasal (11-07)  clotrimazole-betamethasone dipropionate 1%-0.05% topical cream (11-12)  Dexcom G7  (11-12)  famotidine 40 mg oral tablet (11-07)  Freestyle Precision Carlos Strips (11-12)  Glucagon Emergency Kit for Low Blood Sugar 1 mg injection (11-12)  glucometer (per patient&#x27;s insurance) (11-12)  insulin glargine 100 units/mL subcutaneous solution (11-12)  insulin lispro 100 units/mL injectable solution (11-12)  Insulin Pen Needles, 4mm (11-12)  lancets (11-12)  metoprolol tartrate 25 mg oral tablet (11-07)  Norvasc 5 mg oral tablet (11-07)  rimegepant 75 mg oral tablet, disintegrating (11-07)  sertraline 25 mg oral tablet (11-07)  terconazole 0.4% vaginal cream (11-12)  test strips (per patient&#x27;s insurance) (11-12)      PHYSICAL EXAM:  Constitutional: WDWN, NAD  HEENT: sclera non-icteric, neck supple  Respiratory: Non labored respirations  Cardiovascular: RRR, normal S1S2, no M/R/G  Gastrointestinal: Soft Nondistended  Extremities: no edema, no clubbing  Neurological: AAOx3, CN Grossly intact  Skin: Normal temperature, warm, dry

## 2024-11-13 ENCOUNTER — TRANSCRIPTION ENCOUNTER (OUTPATIENT)
Age: 39
End: 2024-11-13

## 2024-11-13 VITALS
TEMPERATURE: 98 F | RESPIRATION RATE: 18 BRPM | SYSTOLIC BLOOD PRESSURE: 117 MMHG | DIASTOLIC BLOOD PRESSURE: 76 MMHG | HEART RATE: 101 BPM

## 2024-11-13 LAB
ALBUMIN SERPL ELPH-MCNC: 3.7 G/DL — SIGNIFICANT CHANGE UP (ref 3.5–5.2)
ALP SERPL-CCNC: 130 U/L — HIGH (ref 30–115)
ALT FLD-CCNC: 55 U/L — HIGH (ref 0–41)
ANION GAP SERPL CALC-SCNC: 11 MMOL/L — SIGNIFICANT CHANGE UP (ref 7–14)
AST SERPL-CCNC: 43 U/L — HIGH (ref 0–41)
BASOPHILS # BLD AUTO: 0.07 K/UL — SIGNIFICANT CHANGE UP (ref 0–0.2)
BASOPHILS NFR BLD AUTO: 0.8 % — SIGNIFICANT CHANGE UP (ref 0–1)
BILIRUB SERPL-MCNC: <0.2 MG/DL — SIGNIFICANT CHANGE UP (ref 0.2–1.2)
BUN SERPL-MCNC: 11 MG/DL — SIGNIFICANT CHANGE UP (ref 10–20)
CALCIUM SERPL-MCNC: 8.8 MG/DL — SIGNIFICANT CHANGE UP (ref 8.4–10.5)
CHLORIDE SERPL-SCNC: 102 MMOL/L — SIGNIFICANT CHANGE UP (ref 98–110)
CO2 SERPL-SCNC: 23 MMOL/L — SIGNIFICANT CHANGE UP (ref 17–32)
CREAT SERPL-MCNC: 0.5 MG/DL — LOW (ref 0.7–1.5)
EGFR: 122 ML/MIN/1.73M2 — SIGNIFICANT CHANGE UP
EOSINOPHIL # BLD AUTO: 0.28 K/UL — SIGNIFICANT CHANGE UP (ref 0–0.7)
EOSINOPHIL NFR BLD AUTO: 3.3 % — SIGNIFICANT CHANGE UP (ref 0–8)
GLUCOSE BLDC GLUCOMTR-MCNC: 85 MG/DL — SIGNIFICANT CHANGE UP (ref 70–99)
GLUCOSE SERPL-MCNC: 85 MG/DL — SIGNIFICANT CHANGE UP (ref 70–99)
HCT VFR BLD CALC: 36.4 % — LOW (ref 37–47)
HGB BLD-MCNC: 10.9 G/DL — LOW (ref 12–16)
IMM GRANULOCYTES NFR BLD AUTO: 1.8 % — HIGH (ref 0.1–0.3)
LYMPHOCYTES # BLD AUTO: 3.04 K/UL — SIGNIFICANT CHANGE UP (ref 1.2–3.4)
LYMPHOCYTES # BLD AUTO: 35.6 % — SIGNIFICANT CHANGE UP (ref 20.5–51.1)
MAGNESIUM SERPL-MCNC: 2 MG/DL — SIGNIFICANT CHANGE UP (ref 1.8–2.4)
MCHC RBC-ENTMCNC: 23.4 PG — LOW (ref 27–31)
MCHC RBC-ENTMCNC: 29.9 G/DL — LOW (ref 32–37)
MCV RBC AUTO: 78.3 FL — LOW (ref 81–99)
MONOCYTES # BLD AUTO: 0.64 K/UL — HIGH (ref 0.1–0.6)
MONOCYTES NFR BLD AUTO: 7.5 % — SIGNIFICANT CHANGE UP (ref 1.7–9.3)
NEUTROPHILS # BLD AUTO: 4.37 K/UL — SIGNIFICANT CHANGE UP (ref 1.4–6.5)
NEUTROPHILS NFR BLD AUTO: 51 % — SIGNIFICANT CHANGE UP (ref 42.2–75.2)
NRBC # BLD: 0 /100 WBCS — SIGNIFICANT CHANGE UP (ref 0–0)
PLATELET # BLD AUTO: 530 K/UL — HIGH (ref 130–400)
PMV BLD: 10.3 FL — SIGNIFICANT CHANGE UP (ref 7.4–10.4)
POTASSIUM SERPL-MCNC: 4.3 MMOL/L — SIGNIFICANT CHANGE UP (ref 3.5–5)
POTASSIUM SERPL-SCNC: 4.3 MMOL/L — SIGNIFICANT CHANGE UP (ref 3.5–5)
PROT SERPL-MCNC: 6.8 G/DL — SIGNIFICANT CHANGE UP (ref 6–8)
RBC # BLD: 4.65 M/UL — SIGNIFICANT CHANGE UP (ref 4.2–5.4)
RBC # FLD: 16.2 % — HIGH (ref 11.5–14.5)
SODIUM SERPL-SCNC: 136 MMOL/L — SIGNIFICANT CHANGE UP (ref 135–146)
WBC # BLD: 8.55 K/UL — SIGNIFICANT CHANGE UP (ref 4.8–10.8)
WBC # FLD AUTO: 8.55 K/UL — SIGNIFICANT CHANGE UP (ref 4.8–10.8)

## 2024-11-13 PROCEDURE — 99232 SBSQ HOSP IP/OBS MODERATE 35: CPT

## 2024-11-13 RX ORDER — BETAMETHASONE DIPROPIONATE 0.5 MG/G
1 OINTMENT TOPICAL EVERY 12 HOURS
Refills: 0 | Status: DISCONTINUED | OUTPATIENT
Start: 2024-11-13 | End: 2024-11-13

## 2024-11-13 RX ORDER — OMEPRAZOLE 40 MG/1
40 CAPSULE, DELAYED RELEASE ORAL
Qty: 30 | Refills: 6 | Status: ACTIVE | COMMUNITY
Start: 2024-11-13 | End: 1900-01-01

## 2024-11-13 RX ORDER — INSULIN LISPRO 100/ML
300 VIAL (ML) SUBCUTANEOUS ONCE
Refills: 0 | Status: COMPLETED | OUTPATIENT
Start: 2024-11-13 | End: 2024-11-13

## 2024-11-13 RX ADMIN — CHLORHEXIDINE GLUCONATE 1 APPLICATION(S): 40 SOLUTION TOPICAL at 06:35

## 2024-11-13 RX ADMIN — FAMOTIDINE 40 MILLIGRAM(S): 10 INJECTION INTRAVENOUS at 11:18

## 2024-11-13 RX ADMIN — Medication 25 MILLIGRAM(S): at 05:34

## 2024-11-13 RX ADMIN — CLOTRIMAZOLE 1 APPLICATORFUL: 10 CREAM TOPICAL at 06:36

## 2024-11-13 RX ADMIN — Medication 300 UNIT(S): at 14:44

## 2024-11-13 RX ADMIN — PANTOPRAZOLE SODIUM 40 MILLIGRAM(S): 40 TABLET, DELAYED RELEASE ORAL at 05:34

## 2024-11-13 RX ADMIN — Medication 5 MILLIGRAM(S): at 05:34

## 2024-11-13 RX ADMIN — SERTRALINE HYDROCHLORIDE 25 MILLIGRAM(S): 50 TABLET, FILM COATED ORAL at 11:18

## 2024-11-13 RX ADMIN — Medication 25 MILLIGRAM(S): at 14:26

## 2024-11-13 NOTE — PROGRESS NOTE ADULT - ATTENDING COMMENTS
38 yo F with hx of DM I (on insulin pump), HTN, GERD and Anxiety/Depression presents to ED for hyperglycemia.    Hyperglycemia 2/2 DM II with malfunction insulin pump  - No evidence of DKA currently.   - c/w Insulin regimen for now.   - Monitor FS closely.   - HbA1c 10   - c/w IVF gentle hydration for now.   -Endo recs appreciated - Recommend discharge on Basaglar Kwikpen 40 units q24hr and Lispro Kwikpen 14 units tid ac until pump therapy reestablished.     Acute bronchitis   - CXR unremarkable.   - s/p decadron 10mg and albuterol nebs given in ED.  - Currently no wheezing on my exam --> hold off steroid.   - c/w albuterol prn   - will give Z-pack     HTN - c/w home med.   GERD - PPI   Anxiety/Depression - c/w home med.      DVT ppx: Lovenox SC    pending: pt unable to return till Monday, Monitor FSG and adjust insulin
40 yo female with h/o type 1 DM was admitted to hospital due to insulin pump disconnection. Insulin pump was replaced and functioning well. Okay to discharge with endocrine follow up outpatient.
Type 1 DM on insulin pump therapy with recent hyperglycemia secondary to either pump malfunction or clogged tubing.  Will need to go home on basal/bolus injections until pump issue resolved.  Suggest for home Basaglar Kwikpen 40 units q24 hrs and Lispro Kwikpen 14 units tid ac.  Sees Dr Galvez as outpatient.

## 2024-11-13 NOTE — PROGRESS NOTE ADULT - SUBJECTIVE AND OBJECTIVE BOX
SUBJECTIVE/OVERNIGHT EVENTS  Today is hospital day 6d. This morning patient was seen and examined at bedside, resting comfortably in bed. Pt reports one episode of light vaginal bleeding she noticed in AM when she went to the restroom. No hematochezia reported overnight. No acute or major events overnight.      MEDICATIONS  STANDING MEDICATIONS  albuterol/ipratropium for Nebulization 3 milliLiter(s) Nebulizer every 4 hours  amLODIPine   Tablet 5 milliGRAM(s) Oral daily  betamethasone valerate 0.1% Cream 1 Application(s) Topical every 12 hours  chlorhexidine 2% Cloths 1 Application(s) Topical <User Schedule>  clotrimazole 2% Vaginal Cream 1 Applicatorful Vaginal every 12 hours  dextrose 5%. 1000 milliLiter(s) IV Continuous <Continuous>  dextrose 5%. 1000 milliLiter(s) IV Continuous <Continuous>  dextrose 50% Injectable 25 Gram(s) IV Push once  enoxaparin Injectable 40 milliGRAM(s) SubCutaneous every 24 hours  famotidine    Tablet 40 milliGRAM(s) Oral daily  glucagon  Injectable 1 milliGRAM(s) IntraMuscular once  insulin lispro Injectable (ADMELOG). 300 Unit(s) SubCutaneous once  metoprolol tartrate 25 milliGRAM(s) Oral three times a day  pantoprazole    Tablet 40 milliGRAM(s) Oral before breakfast  sertraline 25 milliGRAM(s) Oral daily  sodium chloride 0.9%. 1000 milliLiter(s) IV Continuous <Continuous>    PRN MEDICATIONS  albuterol/ipratropium for Nebulization 3 milliLiter(s) Nebulizer every 6 hours PRN  dextrose Oral Gel 15 Gram(s) Oral once PRN  guaiFENesin Oral Liquid (Sugar-Free) 200 milliGRAM(s) Oral every 6 hours PRN    VITALS  T(F): 98.1 (11-13-24 @ 06:20), Max: 99 (11-12-24 @ 21:13)  HR: 84 (11-13-24 @ 06:20) (76 - 112)  BP: 112/73 (11-13-24 @ 06:20) (110/75 - 162/81)  RR: 16 (11-13-24 @ 06:20) (16 - 18)  SpO2: --  POCT Blood Glucose.: 85 mg/dL (11-13-24 @ 07:24)  POCT Blood Glucose.: 147 mg/dL (11-12-24 @ 20:31)  POCT Blood Glucose.: 174 mg/dL (11-12-24 @ 16:48)  POCT Blood Glucose.: 234 mg/dL (11-12-24 @ 12:44)    PHYSICAL EXAM  GENERAL  ( X ) NAD, lying in bed comfortably     (  ) obtunded     (  ) lethargic     (  ) somnolent    HEAD  ( X ) Atraumatic     (  ) hematoma     (  ) laceration (specify location:       )     NECK  ( X ) Supple     (  ) neck stiffness     (  ) nuchal rigidity     (  )  no JVD     (  ) JVD present ( -- cm)    HEART  Rate -->  ( X ) normal rate    (  ) bradycardic    (  ) tachycardic  Rhythm -->  ( X ) regular    (  ) regularly irregular    (  ) irregularly irregular  Murmurs -->  (  ) normal s1/s2    (  ) systolic murmur    (  ) diastolic murmur    (  ) continuous murmur     (  ) S3 present    (  ) S4 present    LUNGS  ( X )Unlabored respirations     (  ) tachypnea  (  ) B/L air entry     (  ) decreased breath sounds in:  (location     )    (  ) no adventitious sound     (  ) crackles     (  ) wheezing      (  ) rhonchi      (specify location:       )  (  ) chest wall tenderness (specify location:       )    ABDOMEN  ( X ) Soft     (  ) tense   |   (  ) nondistended     (  ) distended   |   (  ) +BS     (  ) hypoactive bowel sounds     (  ) hyperactive bowel sounds  (  ) nontender     (  ) RUQ tenderness     (  ) RLQ tenderness     (  ) LLQ tenderness     (  ) epigastric tenderness     (  ) diffuse tenderness  (  ) Splenomegaly      (  ) Hepatomegaly      (  ) Jaundice     (  ) ecchymosis     EXTREMITIES  ( X ) Normal     (  ) Rash     (  ) ecchymosis     (  ) varicose veins      (  ) pitting edema     (  ) non-pitting edema   (  ) ulceration     (  ) gangrene:     (location:     )    NERVOUS SYSTEM  ( X ) A&Ox3     (  ) confused     (  ) lethargic  CN II-XII:     (  ) Intact     (  ) focal deficits  (Specify:     )   Upper extremities:     (  ) strength X/5     (  ) focal deficit (specify:    )  Lower extremities:     (  ) strength  X/5    (  ) focal deficit (specify:    )        LABS             10.9   8.55  )-----------( 530      ( 11-13-24 @ 06:58 )             36.4     136  |  102  |  11  -------------------------<  85   11-13-24 @ 06:58  4.3  |  23  |  0.5    Ca      8.8     11-13-24 @ 06:58  Mg     2.0     11-13-24 @ 06:58    TPro  6.8  /  Alb  3.7  /  TBili  <0.2  /  DBili  x   /  AST  43  /  ALT  55  /  AlkPhos  130  /  GGT  x     11-13-24 @ 06:58        Urinalysis Basic - ( 13 Nov 2024 06:58 )    Color: x / Appearance: x / SG: x / pH: x  Gluc: 85 mg/dL / Ketone: x  / Bili: x / Urobili: x   Blood: x / Protein: x / Nitrite: x   Leuk Esterase: x / RBC: x / WBC x   Sq Epi: x / Non Sq Epi: x / Bacteria: x

## 2024-11-13 NOTE — PROGRESS NOTE ADULT - ASSESSMENT
38 yo F with hx of DM I (on insulin pump), HTN, GERD and Anxiety/Depression presents to ED for hyperglycemia.    Hyperglycemia 2/2 DM II with malfunction insulin pump  - No evidence of DKA currently.   - c/w Insulin regimen for now.   - Monitor FS closely.   - HbA1c 10   - c/w IVF gentle hydration for now.   - Endo recs appreciated - after discussion with Dr. Galvez - pump restarted 11/12 at 2PM, continue close monitoring of FSG     Hematochezia  -reported by patient  -per nurse -stool was loose but no blood - monitor   -Hb stable   -HDS     Vaginal itching and pain  -Fluconazole 150 x 1   -miconazole 2% cream  -UA   Gyn c/s:  Recommend terazol 7 for 7 days QD, internal application  - Recommend lotrisone BID for external genitalia  - Patient to follow up with Dr. Mueller in 2 weeks outpatient  - vaginitis performed, will f/u results  - light vaginal bleeding reported by pt. Last MP 10/28. Explained that this may be from pelvic exam yesterday. Continue to monitor and f/u with Dr. Mueller in 2 weeks.      Acute bronchitis   - CXR unremarkable.   - s/p decadron 10mg and albuterol nebs given in ED.  - Currently no wheezing on my exam --> hold off steroid.   - c/w albuterol prn   - Z-pack completed     HTN - c/w home med.   GERD - PPI   Anxiety/Depression - c/w home med.      DVT ppx: Lovenox SC    d/w Endo    pending: monitor FSG, monitor stool

## 2024-11-13 NOTE — DISCHARGE NOTE NURSING/CASE MANAGEMENT/SOCIAL WORK - PATIENT PORTAL LINK FT
You can access the FollowMyHealth Patient Portal offered by [facility] by registering at the following website: [email or web address]. By joining [facility]’s FollowMyHealth portal, you will also be able to view your health information using other applications (apps) compatible with our system.

## 2024-11-13 NOTE — DISCHARGE NOTE NURSING/CASE MANAGEMENT/SOCIAL WORK - FINANCIAL ASSISTANCE
[facility] provides services at a reduced cost to those who are determined to be eligible through [facility]’s financial assistance program. Information regarding [facility]’s financial assistance program can be found by going to [email or web address] or by calling [phone].

## 2024-11-13 NOTE — DISCHARGE NOTE NURSING/CASE MANAGEMENT/SOCIAL WORK - NSDCPEFALRISK_GEN_ALL_CORE
For information on Fall & Injury Prevention, visit: [email or web address] OR  [email or web address] OR  https://www.cdc.gov/steadi/patient.html

## 2024-11-13 NOTE — PROGRESS NOTE ADULT - PROVIDER SPECIALTY LIST ADULT
Internal Medicine
Endocrinology
Internal Medicine

## 2024-11-13 NOTE — PROGRESS NOTE ADULT - REASON FOR ADMISSION
Hyperglycemia
Patient is a 39y old  Female who presents with a chief complaint of Hyperglycemia (08 Nov 2024 15:35)
Patient is a 39y old  Female who presents with a chief complaint of Patient is a 39y old  Female who presents with a chief complaint of hyperglycemia (11 Nov 2024 12:08) (11 Nov 2024 14:21)
Patient is a 39y old  Female who presents with a chief complaint of Patient is a 39y old  Female who presents with a chief complaint of Hyperglycemia (08 Nov 2024 15:35) (08 Nov 2024 16:38)
hyperglycemia
Patient is a 39y old  Female who presents with a chief complaint of hyperglycemia (11 Nov 2024 12:08)
hyperglycemia

## 2024-11-14 ENCOUNTER — APPOINTMENT (OUTPATIENT)
Dept: PSYCHIATRY | Facility: CLINIC | Age: 39
End: 2024-11-14

## 2024-11-16 ENCOUNTER — EMERGENCY (EMERGENCY)
Facility: HOSPITAL | Age: 39
LOS: 0 days | Discharge: HOME | End: 2024-11-17
Attending: EMERGENCY MEDICINE
Payer: MEDICAID

## 2024-11-16 VITALS
WEIGHT: 190.04 LBS | HEART RATE: 94 BPM | OXYGEN SATURATION: 99 % | DIASTOLIC BLOOD PRESSURE: 82 MMHG | SYSTOLIC BLOOD PRESSURE: 143 MMHG | HEIGHT: 64 IN | RESPIRATION RATE: 17 BRPM | TEMPERATURE: 98 F

## 2024-11-16 DIAGNOSIS — K21.9 GASTRO-ESOPHAGEAL REFLUX DISEASE WITHOUT ESOPHAGITIS: ICD-10-CM

## 2024-11-16 DIAGNOSIS — R11.0 NAUSEA: ICD-10-CM

## 2024-11-16 DIAGNOSIS — R19.7 DIARRHEA, UNSPECIFIED: ICD-10-CM

## 2024-11-16 DIAGNOSIS — R10.13 EPIGASTRIC PAIN: ICD-10-CM

## 2024-11-16 DIAGNOSIS — Z88.8 ALLERGY STATUS TO OTHER DRUGS, MEDICAMENTS AND BIOLOGICAL SUBSTANCES: ICD-10-CM

## 2024-11-16 DIAGNOSIS — J40 BRONCHITIS, NOT SPECIFIED AS ACUTE OR CHRONIC: ICD-10-CM

## 2024-11-16 DIAGNOSIS — M54.50 LOW BACK PAIN, UNSPECIFIED: ICD-10-CM

## 2024-11-16 DIAGNOSIS — E11.40 TYPE 2 DIABETES MELLITUS WITH DIABETIC NEUROPATHY, UNSPECIFIED: ICD-10-CM

## 2024-11-16 DIAGNOSIS — K64.9 UNSPECIFIED HEMORRHOIDS: ICD-10-CM

## 2024-11-16 DIAGNOSIS — F32.A DEPRESSION, UNSPECIFIED: ICD-10-CM

## 2024-11-16 DIAGNOSIS — K92.1 MELENA: ICD-10-CM

## 2024-11-16 DIAGNOSIS — R10.30 LOWER ABDOMINAL PAIN, UNSPECIFIED: ICD-10-CM

## 2024-11-16 DIAGNOSIS — R35.0 FREQUENCY OF MICTURITION: ICD-10-CM

## 2024-11-16 DIAGNOSIS — Z88.7 ALLERGY STATUS TO SERUM AND VACCINE: ICD-10-CM

## 2024-11-16 DIAGNOSIS — Z88.0 ALLERGY STATUS TO PENICILLIN: ICD-10-CM

## 2024-11-16 DIAGNOSIS — Z88.1 ALLERGY STATUS TO OTHER ANTIBIOTIC AGENTS: ICD-10-CM

## 2024-11-16 LAB
A VAGINAE DNA VAG QL NAA+PROBE: SIGNIFICANT CHANGE UP
ALBUMIN SERPL ELPH-MCNC: 4 G/DL — SIGNIFICANT CHANGE UP (ref 3.5–5.2)
ALP SERPL-CCNC: 150 U/L — HIGH (ref 30–115)
ALT FLD-CCNC: 74 U/L — HIGH (ref 0–41)
ANION GAP SERPL CALC-SCNC: 12 MMOL/L — SIGNIFICANT CHANGE UP (ref 7–14)
APPEARANCE UR: CLEAR — SIGNIFICANT CHANGE UP
AST SERPL-CCNC: 48 U/L — HIGH (ref 0–41)
BASOPHILS # BLD AUTO: 0.06 K/UL — SIGNIFICANT CHANGE UP (ref 0–0.2)
BASOPHILS NFR BLD AUTO: 0.6 % — SIGNIFICANT CHANGE UP (ref 0–1)
BILIRUB SERPL-MCNC: <0.2 MG/DL — SIGNIFICANT CHANGE UP (ref 0.2–1.2)
BILIRUB UR-MCNC: NEGATIVE — SIGNIFICANT CHANGE UP
BUN SERPL-MCNC: 12 MG/DL — SIGNIFICANT CHANGE UP (ref 10–20)
BVAB2 DNA VAG QL NAA+PROBE: SIGNIFICANT CHANGE UP
C ALBICANS DNA VAG QL NAA+PROBE: POSITIVE
C GLABRATA DNA VAG QL NAA+PROBE: NEGATIVE — SIGNIFICANT CHANGE UP
C KRUSEI DNA VAG QL NAA+PROBE: NEGATIVE — SIGNIFICANT CHANGE UP
C LUSITANIAE DNA VAG QL NAA+PROBE: NEGATIVE — SIGNIFICANT CHANGE UP
C TRACH RRNA SPEC QL NAA+PROBE: NEGATIVE — SIGNIFICANT CHANGE UP
CALCIUM SERPL-MCNC: 9.4 MG/DL — SIGNIFICANT CHANGE UP (ref 8.4–10.5)
CANDIDA DNA VAG QL NAA+PROBE: NEGATIVE — SIGNIFICANT CHANGE UP
CHLORIDE SERPL-SCNC: 104 MMOL/L — SIGNIFICANT CHANGE UP (ref 98–110)
CO2 SERPL-SCNC: 23 MMOL/L — SIGNIFICANT CHANGE UP (ref 17–32)
COLOR SPEC: YELLOW — SIGNIFICANT CHANGE UP
CREAT SERPL-MCNC: 0.5 MG/DL — LOW (ref 0.7–1.5)
DIFF PNL FLD: NEGATIVE — SIGNIFICANT CHANGE UP
EGFR: 122 ML/MIN/1.73M2 — SIGNIFICANT CHANGE UP
EGFR: 122 ML/MIN/1.73M2 — SIGNIFICANT CHANGE UP
EOSINOPHIL # BLD AUTO: 0.28 K/UL — SIGNIFICANT CHANGE UP (ref 0–0.7)
EOSINOPHIL NFR BLD AUTO: 2.8 % — SIGNIFICANT CHANGE UP (ref 0–8)
GLUCOSE SERPL-MCNC: 76 MG/DL — SIGNIFICANT CHANGE UP (ref 70–99)
GLUCOSE UR QL: NEGATIVE MG/DL — SIGNIFICANT CHANGE UP
HCT VFR BLD CALC: 35.3 % — LOW (ref 37–47)
HGB BLD-MCNC: 10.7 G/DL — LOW (ref 12–16)
IMM GRANULOCYTES NFR BLD AUTO: 0.5 % — HIGH (ref 0.1–0.3)
KETONES UR-MCNC: ABNORMAL MG/DL
LACTATE SERPL-SCNC: 1.1 MMOL/L — SIGNIFICANT CHANGE UP (ref 0.7–2)
LEUKOCYTE ESTERASE UR-ACNC: NEGATIVE — SIGNIFICANT CHANGE UP
LIDOCAIN IGE QN: 17 U/L — SIGNIFICANT CHANGE UP (ref 7–60)
LYMPHOCYTES # BLD AUTO: 3.37 K/UL — SIGNIFICANT CHANGE UP (ref 1.2–3.4)
LYMPHOCYTES # BLD AUTO: 34 % — SIGNIFICANT CHANGE UP (ref 20.5–51.1)
MCHC RBC-ENTMCNC: 23.5 PG — LOW (ref 27–31)
MCHC RBC-ENTMCNC: 30.3 G/DL — LOW (ref 32–37)
MCV RBC AUTO: 77.6 FL — LOW (ref 81–99)
MEGA1 DNA VAG QL NAA+PROBE: SIGNIFICANT CHANGE UP
MONOCYTES # BLD AUTO: 0.82 K/UL — HIGH (ref 0.1–0.6)
MONOCYTES NFR BLD AUTO: 8.3 % — SIGNIFICANT CHANGE UP (ref 1.7–9.3)
N GONORRHOEA RRNA SPEC QL NAA+PROBE: NEGATIVE — SIGNIFICANT CHANGE UP
NEUTROPHILS # BLD AUTO: 5.34 K/UL — SIGNIFICANT CHANGE UP (ref 1.4–6.5)
NEUTROPHILS NFR BLD AUTO: 53.8 % — SIGNIFICANT CHANGE UP (ref 42.2–75.2)
NITRITE UR-MCNC: NEGATIVE — SIGNIFICANT CHANGE UP
NRBC # BLD: 0 /100 WBCS — SIGNIFICANT CHANGE UP (ref 0–0)
NRBC BLD-RTO: 0 /100 WBCS — SIGNIFICANT CHANGE UP (ref 0–0)
PH UR: 5.5 — SIGNIFICANT CHANGE UP (ref 5–8)
PLATELET # BLD AUTO: 533 K/UL — HIGH (ref 130–400)
PMV BLD: 10.2 FL — SIGNIFICANT CHANGE UP (ref 7.4–10.4)
POTASSIUM SERPL-MCNC: 4.2 MMOL/L — SIGNIFICANT CHANGE UP (ref 3.5–5)
POTASSIUM SERPL-SCNC: 4.2 MMOL/L — SIGNIFICANT CHANGE UP (ref 3.5–5)
PROT SERPL-MCNC: 7.2 G/DL — SIGNIFICANT CHANGE UP (ref 6–8)
PROT UR-MCNC: SIGNIFICANT CHANGE UP MG/DL
RBC # BLD: 4.55 M/UL — SIGNIFICANT CHANGE UP (ref 4.2–5.4)
RBC # FLD: 16.2 % — HIGH (ref 11.5–14.5)
SODIUM SERPL-SCNC: 139 MMOL/L — SIGNIFICANT CHANGE UP (ref 135–146)
SP GR SPEC: 1.02 — SIGNIFICANT CHANGE UP (ref 1–1.03)
T VAGINALIS RRNA SPEC QL NAA+PROBE: NEGATIVE — SIGNIFICANT CHANGE UP
UROBILINOGEN FLD QL: 0.2 MG/DL — SIGNIFICANT CHANGE UP (ref 0.2–1)
WBC # BLD: 9.92 K/UL — SIGNIFICANT CHANGE UP (ref 4.8–10.8)
WBC # FLD AUTO: 9.92 K/UL — SIGNIFICANT CHANGE UP (ref 4.8–10.8)

## 2024-11-16 PROCEDURE — 99284 EMERGENCY DEPT VISIT MOD MDM: CPT | Mod: 25

## 2024-11-16 PROCEDURE — 99285 EMERGENCY DEPT VISIT HI MDM: CPT

## 2024-11-16 PROCEDURE — 87507 IADNA-DNA/RNA PROBE TQ 12-25: CPT

## 2024-11-16 PROCEDURE — 74177 CT ABD & PELVIS W/CONTRAST: CPT | Mod: MC

## 2024-11-16 PROCEDURE — 83605 ASSAY OF LACTIC ACID: CPT

## 2024-11-16 PROCEDURE — 36415 COLL VENOUS BLD VENIPUNCTURE: CPT

## 2024-11-16 PROCEDURE — 87449 NOS EACH ORGANISM AG IA: CPT

## 2024-11-16 PROCEDURE — 87324 CLOSTRIDIUM AG IA: CPT

## 2024-11-16 PROCEDURE — 80053 COMPREHEN METABOLIC PANEL: CPT

## 2024-11-16 PROCEDURE — 81025 URINE PREGNANCY TEST: CPT

## 2024-11-16 PROCEDURE — 81003 URINALYSIS AUTO W/O SCOPE: CPT

## 2024-11-16 PROCEDURE — 83690 ASSAY OF LIPASE: CPT

## 2024-11-16 PROCEDURE — 82962 GLUCOSE BLOOD TEST: CPT

## 2024-11-16 PROCEDURE — 85025 COMPLETE CBC W/AUTO DIFF WBC: CPT

## 2024-11-16 RX ORDER — SODIUM CHLORIDE 9 G/1000ML
2000 INJECTION, SOLUTION INTRAVENOUS ONCE
Refills: 0 | Status: COMPLETED | OUTPATIENT
Start: 2024-11-16 | End: 2024-11-16

## 2024-11-16 RX ADMIN — SODIUM CHLORIDE 2000 MILLILITER(S): 9 INJECTION, SOLUTION INTRAVENOUS at 22:03

## 2024-11-17 LAB
C DIFF GDH STL QL: NEGATIVE — SIGNIFICANT CHANGE UP
C DIFF GDH STL QL: SIGNIFICANT CHANGE UP
CAMPYLOBACTER DNA SPEC NAA+PROBE: DETECTED
GI PCR PANEL: DETECTED

## 2024-11-17 PROCEDURE — 74177 CT ABD & PELVIS W/CONTRAST: CPT | Mod: 26,MC

## 2024-11-17 RX ORDER — LIDOCAINE HYDROCHLORIDE 20 MG/ML
1 JELLY TOPICAL ONCE
Refills: 0 | Status: COMPLETED | OUTPATIENT
Start: 2024-11-17 | End: 2024-11-17

## 2024-11-17 RX ADMIN — LIDOCAINE HYDROCHLORIDE 1 PATCH: 20 JELLY TOPICAL at 03:08

## 2024-11-18 ENCOUNTER — NON-APPOINTMENT (OUTPATIENT)
Age: 39
End: 2024-11-18

## 2024-11-18 ENCOUNTER — APPOINTMENT (OUTPATIENT)
Dept: INTERNAL MEDICINE | Facility: CLINIC | Age: 39
End: 2024-11-18

## 2024-11-19 ENCOUNTER — APPOINTMENT (OUTPATIENT)
Dept: OBGYN | Facility: CLINIC | Age: 39
End: 2024-11-19

## 2024-11-20 ENCOUNTER — EMERGENCY (EMERGENCY)
Facility: HOSPITAL | Age: 39
LOS: 0 days | Discharge: HOME | End: 2024-11-21
Attending: STUDENT IN AN ORGANIZED HEALTH CARE EDUCATION/TRAINING PROGRAM
Payer: MEDICAID

## 2024-11-20 VITALS
RESPIRATION RATE: 18 BRPM | HEART RATE: 88 BPM | OXYGEN SATURATION: 100 % | HEIGHT: 64 IN | SYSTOLIC BLOOD PRESSURE: 123 MMHG | TEMPERATURE: 99 F | DIASTOLIC BLOOD PRESSURE: 81 MMHG

## 2024-11-20 DIAGNOSIS — A04.5 CAMPYLOBACTER ENTERITIS: ICD-10-CM

## 2024-11-20 LAB
ALBUMIN SERPL ELPH-MCNC: 4.2 G/DL — SIGNIFICANT CHANGE UP (ref 3.5–5.2)
ALP SERPL-CCNC: 142 U/L — HIGH (ref 30–115)
ALT FLD-CCNC: 53 U/L — HIGH (ref 0–41)
ANION GAP SERPL CALC-SCNC: 10 MMOL/L — SIGNIFICANT CHANGE UP (ref 7–14)
APPEARANCE UR: CLEAR — SIGNIFICANT CHANGE UP
AST SERPL-CCNC: 25 U/L — SIGNIFICANT CHANGE UP (ref 0–41)
BASOPHILS # BLD AUTO: 0.06 K/UL — SIGNIFICANT CHANGE UP (ref 0–0.2)
BASOPHILS NFR BLD AUTO: 0.6 % — SIGNIFICANT CHANGE UP (ref 0–1)
BILIRUB SERPL-MCNC: <0.2 MG/DL — SIGNIFICANT CHANGE UP (ref 0.2–1.2)
BILIRUB UR-MCNC: NEGATIVE — SIGNIFICANT CHANGE UP
BUN SERPL-MCNC: 10 MG/DL — SIGNIFICANT CHANGE UP (ref 10–20)
CALCIUM SERPL-MCNC: 9.1 MG/DL — SIGNIFICANT CHANGE UP (ref 8.4–10.5)
CHLORIDE SERPL-SCNC: 102 MMOL/L — SIGNIFICANT CHANGE UP (ref 98–110)
CO2 SERPL-SCNC: 26 MMOL/L — SIGNIFICANT CHANGE UP (ref 17–32)
COLOR SPEC: YELLOW — SIGNIFICANT CHANGE UP
CREAT SERPL-MCNC: 0.6 MG/DL — LOW (ref 0.7–1.5)
DIFF PNL FLD: NEGATIVE — SIGNIFICANT CHANGE UP
EGFR: 117 ML/MIN/1.73M2 — SIGNIFICANT CHANGE UP
EOSINOPHIL # BLD AUTO: 0.11 K/UL — SIGNIFICANT CHANGE UP (ref 0–0.7)
EOSINOPHIL NFR BLD AUTO: 1 % — SIGNIFICANT CHANGE UP (ref 0–8)
GLUCOSE SERPL-MCNC: 103 MG/DL — HIGH (ref 70–99)
GLUCOSE UR QL: NEGATIVE MG/DL — SIGNIFICANT CHANGE UP
HCG SERPL QL: NEGATIVE — SIGNIFICANT CHANGE UP
HCT VFR BLD CALC: 35 % — LOW (ref 37–47)
HGB BLD-MCNC: 10.4 G/DL — LOW (ref 12–16)
IMM GRANULOCYTES NFR BLD AUTO: 0.4 % — HIGH (ref 0.1–0.3)
KETONES UR-MCNC: ABNORMAL MG/DL
LACTATE SERPL-SCNC: 1.4 MMOL/L — SIGNIFICANT CHANGE UP (ref 0.7–2)
LEUKOCYTE ESTERASE UR-ACNC: NEGATIVE — SIGNIFICANT CHANGE UP
LIDOCAIN IGE QN: 14 U/L — SIGNIFICANT CHANGE UP (ref 7–60)
LYMPHOCYTES # BLD AUTO: 2.51 K/UL — SIGNIFICANT CHANGE UP (ref 1.2–3.4)
LYMPHOCYTES # BLD AUTO: 23.8 % — SIGNIFICANT CHANGE UP (ref 20.5–51.1)
MCHC RBC-ENTMCNC: 23.2 PG — LOW (ref 27–31)
MCHC RBC-ENTMCNC: 29.7 G/DL — LOW (ref 32–37)
MCV RBC AUTO: 78 FL — LOW (ref 81–99)
MONOCYTES # BLD AUTO: 0.69 K/UL — HIGH (ref 0.1–0.6)
MONOCYTES NFR BLD AUTO: 6.5 % — SIGNIFICANT CHANGE UP (ref 1.7–9.3)
NEUTROPHILS # BLD AUTO: 7.13 K/UL — HIGH (ref 1.4–6.5)
NEUTROPHILS NFR BLD AUTO: 67.7 % — SIGNIFICANT CHANGE UP (ref 42.2–75.2)
NITRITE UR-MCNC: NEGATIVE — SIGNIFICANT CHANGE UP
NRBC # BLD: 0 /100 WBCS — SIGNIFICANT CHANGE UP (ref 0–0)
PH UR: 6 — SIGNIFICANT CHANGE UP (ref 5–8)
PLATELET # BLD AUTO: 548 K/UL — HIGH (ref 130–400)
PMV BLD: 10.3 FL — SIGNIFICANT CHANGE UP (ref 7.4–10.4)
POTASSIUM SERPL-MCNC: 4.1 MMOL/L — SIGNIFICANT CHANGE UP (ref 3.5–5)
POTASSIUM SERPL-SCNC: 4.1 MMOL/L — SIGNIFICANT CHANGE UP (ref 3.5–5)
PROT SERPL-MCNC: 7.2 G/DL — SIGNIFICANT CHANGE UP (ref 6–8)
PROT UR-MCNC: SIGNIFICANT CHANGE UP MG/DL
RBC # BLD: 4.49 M/UL — SIGNIFICANT CHANGE UP (ref 4.2–5.4)
RBC # FLD: 16.3 % — HIGH (ref 11.5–14.5)
SODIUM SERPL-SCNC: 138 MMOL/L — SIGNIFICANT CHANGE UP (ref 135–146)
SP GR SPEC: 1.03 — SIGNIFICANT CHANGE UP (ref 1–1.03)
UROBILINOGEN FLD QL: 1 MG/DL — SIGNIFICANT CHANGE UP (ref 0.2–1)
WBC # BLD: 10.54 K/UL — SIGNIFICANT CHANGE UP (ref 4.8–10.8)
WBC # FLD AUTO: 10.54 K/UL — SIGNIFICANT CHANGE UP (ref 4.8–10.8)

## 2024-11-20 PROCEDURE — 71046 X-RAY EXAM CHEST 2 VIEWS: CPT

## 2024-11-20 PROCEDURE — 85025 COMPLETE CBC W/AUTO DIFF WBC: CPT

## 2024-11-20 PROCEDURE — 96376 TX/PRO/DX INJ SAME DRUG ADON: CPT | Mod: XU

## 2024-11-20 PROCEDURE — 74177 CT ABD & PELVIS W/CONTRAST: CPT | Mod: MC

## 2024-11-20 PROCEDURE — 99285 EMERGENCY DEPT VISIT HI MDM: CPT

## 2024-11-20 PROCEDURE — 36415 COLL VENOUS BLD VENIPUNCTURE: CPT

## 2024-11-20 PROCEDURE — 93005 ELECTROCARDIOGRAM TRACING: CPT

## 2024-11-20 PROCEDURE — 93308 TTE F-UP OR LMTD: CPT

## 2024-11-20 PROCEDURE — 96374 THER/PROPH/DIAG INJ IV PUSH: CPT | Mod: XU

## 2024-11-20 PROCEDURE — 71046 X-RAY EXAM CHEST 2 VIEWS: CPT | Mod: 26

## 2024-11-20 PROCEDURE — 83690 ASSAY OF LIPASE: CPT

## 2024-11-20 PROCEDURE — 96375 TX/PRO/DX INJ NEW DRUG ADDON: CPT | Mod: XU

## 2024-11-20 PROCEDURE — 84703 CHORIONIC GONADOTROPIN ASSAY: CPT

## 2024-11-20 PROCEDURE — 74177 CT ABD & PELVIS W/CONTRAST: CPT | Mod: 26,MC

## 2024-11-20 PROCEDURE — 80053 COMPREHEN METABOLIC PANEL: CPT

## 2024-11-20 PROCEDURE — 93010 ELECTROCARDIOGRAM REPORT: CPT | Mod: 76

## 2024-11-20 PROCEDURE — 99285 EMERGENCY DEPT VISIT HI MDM: CPT | Mod: 25

## 2024-11-20 PROCEDURE — 83605 ASSAY OF LACTIC ACID: CPT

## 2024-11-20 PROCEDURE — 81003 URINALYSIS AUTO W/O SCOPE: CPT

## 2024-11-20 PROCEDURE — 93308 TTE F-UP OR LMTD: CPT | Mod: 26

## 2024-11-20 RX ORDER — FAMOTIDINE 10 MG/ML
20 INJECTION INTRAVENOUS ONCE
Refills: 0 | Status: COMPLETED | OUTPATIENT
Start: 2024-11-20 | End: 2024-11-20

## 2024-11-20 RX ORDER — ONDANSETRON HYDROCHLORIDE 2 MG/ML
4 INJECTION, SOLUTION INTRAMUSCULAR; INTRAVENOUS ONCE
Refills: 0 | Status: COMPLETED | OUTPATIENT
Start: 2024-11-20 | End: 2024-11-20

## 2024-11-20 RX ORDER — KETOROLAC TROMETHAMINE 30 MG/ML
15 INJECTION INTRAMUSCULAR; INTRAVENOUS ONCE
Refills: 0 | Status: DISCONTINUED | OUTPATIENT
Start: 2024-11-20 | End: 2024-11-20

## 2024-11-20 RX ORDER — SODIUM CHLORIDE 9 MG/ML
1000 INJECTION, SOLUTION INTRAMUSCULAR; INTRAVENOUS; SUBCUTANEOUS ONCE
Refills: 0 | Status: COMPLETED | OUTPATIENT
Start: 2024-11-20 | End: 2024-11-20

## 2024-11-20 RX ORDER — AZITHROMYCIN 500 MG/1
500 TABLET, FILM COATED ORAL
Qty: 1 | Refills: 0 | Status: ACTIVE | COMMUNITY
Start: 2024-11-20 | End: 1900-01-01

## 2024-11-20 RX ORDER — DICYCLOMINE HYDROCHLORIDE 20 MG/1
20 TABLET ORAL ONCE
Refills: 0 | Status: COMPLETED | OUTPATIENT
Start: 2024-11-20 | End: 2024-11-20

## 2024-11-20 RX ADMIN — KETOROLAC TROMETHAMINE 15 MILLIGRAM(S): 30 INJECTION INTRAMUSCULAR; INTRAVENOUS at 20:09

## 2024-11-20 RX ADMIN — FAMOTIDINE 20 MILLIGRAM(S): 10 INJECTION INTRAVENOUS at 20:09

## 2024-11-20 RX ADMIN — ONDANSETRON HYDROCHLORIDE 4 MILLIGRAM(S): 2 INJECTION, SOLUTION INTRAMUSCULAR; INTRAVENOUS at 20:09

## 2024-11-20 RX ADMIN — ONDANSETRON HYDROCHLORIDE 4 MILLIGRAM(S): 2 INJECTION, SOLUTION INTRAMUSCULAR; INTRAVENOUS at 23:22

## 2024-11-20 RX ADMIN — SODIUM CHLORIDE 1000 MILLILITER(S): 9 INJECTION, SOLUTION INTRAMUSCULAR; INTRAVENOUS; SUBCUTANEOUS at 20:09

## 2024-11-20 NOTE — ED PROVIDER NOTE - PATIENT PORTAL LINK FT
You can access the FollowMyHealth Patient Portal offered by [facility] by registering at the following website: [email or web address]. By joining [facility]’s FollowMyHealth portal, you will also be able to view your health information using other applications (apps) compatible with our system.

## 2024-11-20 NOTE — ED PROVIDER NOTE - PHYSICAL EXAMINATION
Vital Signs: I have reviewed the initial vital signs.  CONSTITUTIONAL: Pt in no acute distress laying on stretcher.  SKIN: Skin exam is warm and dry, no acute rash.  HEAD: Normocephalic; atraumatic.  EYES: PERRL, EOM intact; conjunctiva and sclera clear.  ENT: No nasal discharge; airway clear.   NECK: Supple;   CARD: S1, S2 normal; no murmurs, gallops, or rubs. Regular rate and rhythm.  RESP: CTAB. No wheezes, rales or rhonchi.  ABD:+LLQ tenderness. soft; non-distended;  no hepatosplenomegaly.  MSK: Normal ROM. No clubbing, cyanosis or edema.

## 2024-11-20 NOTE — ED PROVIDER NOTE - CARE PLAN
1 Principal Discharge DX:	Abdominal pain  Secondary Diagnosis:	Diarrhea  Secondary Diagnosis:	Chest pain

## 2024-11-20 NOTE — ED PROVIDER NOTE - OBJECTIVE STATEMENT
39-year-old female past medical history diabetes, GERD, anxiety, presents with left lower quadrant pain and several episodes of nonbloody diarrhea X 3 days.  Patient states she was seen in ED 3 days ago for similar symptoms and discharged after negative workup.  Additionally patient states she was admitted to hospital earlier this month and while admitted was diagnosed with Campylobacter because she was having diarrhea at that time.  Patient states symptoms are the same consistency as time of diagnosis.  Additionally patient reports episode of chest pain today, feeling similar to when she feels anxious.  Denies fever, headache, shortness of breath, vomiting, urinary symptoms.

## 2024-11-20 NOTE — ED PROVIDER NOTE - PROGRESS NOTE DETAILS
KY: CT scan unchanged from prior, patient feeling improved.  Patient states she has follow-up call with GI doctor Dr. Barreto for tomorrow and has yet to start antibiotics prescribed by Dr. Barreto.  States she will  medications at pharmacy and follow-up. Pt already aware of incidental findings on CT scan.

## 2024-11-20 NOTE — ED PROVIDER NOTE - NSFOLLOWUPINSTRUCTIONS_ED_ALL_ED_FT
Follow-up with your GI doctor Dr. Barreto as scheduled.    Abdominal Pain    Many things can cause abdominal pain. Many times, abdominal pain is not caused by a disease and will improve without treatment. Your health care provider will do a physical exam to determine if there is a dangerous cause of your pain; blood tests and imaging may help determine the cause of your pain. However, in many cases, no cause may be found and you may need further testing as an outpatient. Monitor your abdominal pain for any changes.     SEEK IMMEDIATE MEDICAL CARE IF YOU HAVE ANY OF THE FOLLOWING SYMPTOMS: worsening abdominal pain, uncontrollable vomiting, profuse diarrhea, inability to have bowel movements or pass gas, black or bloody stools, fever accompanying chest pain or back pain, or fainting. These symptoms may represent a serious problem that is an emergency. Do not wait to see if the symptoms will go away. Get medical help right away. Call 911 and do not drive yourself to the hospital.     Diarrhea    Diarrhea is frequent loose or watery bowel movements that has many causes. Diarrhea can make you feel weak and cause you to become dehydrated. Diarrhea typically lasts 2–3 days, but can last longer if it is a sign of something more serious. Drink clear fluids to prevent dehydration. Eat bland, easy-to-digest foods as tolerated.     SEEK IMMEDIATE MEDICAL CARE IF YOU HAVE ANY OF THE FOLLOWING SYMPTOMS: high fevers, lightheadedness/dizziness, chest pain, black or bloody stools, shortness of breath, severe abdominal or back pain, or any signs of dehydration.     Campylobacter Gastroenteritis  Campylobacter gastroenteritis is a common infection of the stomach and intestines that can cause diarrhea and other symptoms. It is caused by Campylobacter bacteria. These bacteria often infect animals, and the condition spreads easily to other animals and humans through food and water that contain the bacteria (are contaminated). Campylobacter gastroenteritis is also known as traveler's diarrhea, and it is more common in countries where food and water are contaminated.    In very rare cases, a campylobacter infection may lead to another condition called Guillain–Barré syndrome (GBS). This condition can occur when the body's disease-fighting system (immune system) overreacts after an infection. Signs of GBS include a progressive weakness or inability to move (paralysis) that is usually symmetrical, or found on both sides of the body. Weakness may last for weeks or even years.    What are the causes?  This condition is caused by Campylobacter bacteria. You may get this infection if you:  Drink water that is contaminated by stool (feces) of an infected animal or person.  Eat raw or undercooked meat from an infected animal.  Eat raw or undercooked fruits or vegetables that have come in contact with the stool or meat of an infected animal.  Drink milk that has not been heated enough to kill bacteria (not pasteurized).  Touch anything that is contaminated, then touch your mouth before you have washed your hands.  What increases the risk?  People at higher risk of infection include:  Young children.  Aging adults.  People with a weak immune system, such as people who have AIDS (acquired immunodeficiency syndrome), cancer, or other long-term (chronic) diseases.  Pregnant women.  What are the signs or symptoms?  Symptoms of this condition include:  Watery diarrhea, which may be bloody.  Pain in the abdomen.  Cramps.  Fever.  Nausea and vomiting.  Muscle aches or headache.  Symptoms usually start 2–5 days after infection occurs. Symptoms may be more severe in people who have a weak immune system.    Healthy people may have the infection without showing any symptoms.    How is this diagnosed?  This condition may be diagnosed based on:  Your symptoms.  Recent history of travel to a place where water contamination is common.  A physical exam.  Testing a sample of stool or body fluid to check for bacteria.  How is this treated?  In most cases, this condition goes away without treatment within 7 days. Your health care provider may:  Recommend an oral rehydration solution (ORS). This is a drink that helps you replace fluids and electrolytes. It is found at pharmacies and retail stores.  Prescribe medicines to relieve nausea, vomiting, or diarrhea.  Prescribe antibiotic medicine, if you have a weak immune system or severe symptoms.  Give you IV fluids at the hospital to prevent dehydration, if you cannot drink enough to replace fluids that you lost because of severe vomiting or diarrhea.  Follow these instructions at home:  Medicines    Take over-the-counter and prescription medicines only as told by your health care provider.  Do not take medicines to help with diarrhea. These medicines can make the infection worse.  If you were prescribed an antibiotic, take it as told by your health care provider. Do not stop taking the antibiotic even if you start to feel better.  Eating and drinking    A comparison of three sample cups showing dark yellow, yellow, and pale yellow urine.  Drink clear fluids as you are able. Clear fluids include water, ice chips, diluted fruit juice, and low-calorie sports drinks.  Eat small meals throughout the day that include healthy foods such as whole grains, lean meats, and fruits and vegetables.  Drink enough fluid to keep your urine pale yellow. This is especially important if you are vomiting or have diarrhea.  Avoid fluids that contain a lot of sugar or caffeine, such as energy drinks, soda, and some sports drinks.  Avoid alcohol.  General instructions    Rest at home until your symptoms go away. Return to your normal activities as told by your health care provider.  Wash your hands often with soap and hot water for at least 20 seconds. If soap and water are not available, use hand .  Keep all follow-up visits. This is important.  How is this prevented?  Washing hands with soap and water.  Use a meat thermometer to make sure you cook meat to the recommended temperature.  Always wash fruits and vegetables before eating or preparing them.  Do not drink unpasteurized (raw) milk.  Do not let food come in contact with raw meat or any juice from raw meat.  After you prepare raw meat, wash your hands, countertops, cutting boards, and all utensils with hot water and soap.  If you use a cloth dishtowel, do not use it again until you have washed it.  Wash your hands with hot water and soap after going to the bathroom, changing a diaper, or coming in contact with pet feces.  When traveling in a country where contamination is common:  Do not eat any uncooked foods.  Drink only bottled water.  Contact a health care provider if:  Your symptoms last more than 7 days.  Your symptoms get worse.  You have a fever.  Get help right away if:  You cannot drink fluids without vomiting.  You have trouble breathing.  You develop weakness or paralysis.  You have symptoms of dehydration, such as:  Dry skin.  Thirst.  Decreased or dark urine.  Tiredness or confusion.  These symptoms may represent a serious problem that is an emergency. Do not wait to see if the symptoms will go away. Get medical help right away. Call your local emergency services (911 in the [place] Do not drive yourself to the hospital.    Summary  Campylobacter gastroenteritis is a common infection that can cause diarrhea and other symptoms. It is caused by bacteria.  Campylobacter gastroenteritis is also known as traveler's diarrhea, and it is more common in countries where food and water are contaminated.  In most cases, this condition goes away without treatment within 7 days.  Get help right away if you cannot drink fluids without vomiting, you have trouble breathing, you have symptoms of dehydration, or you develop weakness or paralysis.  This information is not intended to replace advice given to you by your health care provider. Make sure you discuss any questions you have with your health care provider.

## 2024-11-20 NOTE — ED PROVIDER NOTE - CLINICAL SUMMARY MEDICAL DECISION MAKING FREE TEXT BOX
Patient with chest pain recent diagnosis of Campylobacter blood in stool has since resolved abdomen soft    Pt feeling improved, tolerating PO, abdomen soft, non-tender, non-distended, no rebound, no guarding.    Patient already on azithromycin which she has not started taking    Independently interpretted by Maged Salmeron DO:  XR interpretation: No cardiopulmonary disease,   EKG interpretation: no CRISELDA, NSR    Appropriate medications for patient's presenting complaints were ordered and effects were reassessed.   Patient's external records were reviewed.     Escalation to admission and/or observation was considered.  Patient feels much better and is comfortable with discharge.  Appropriate follow-up was arranged.

## 2024-11-21 ENCOUNTER — NON-APPOINTMENT (OUTPATIENT)
Age: 39
End: 2024-11-21

## 2024-11-21 ENCOUNTER — APPOINTMENT (OUTPATIENT)
Dept: PSYCHIATRY | Facility: CLINIC | Age: 39
End: 2024-11-21

## 2024-11-21 DIAGNOSIS — R25.1 TREMOR, UNSPECIFIED: ICD-10-CM

## 2024-11-21 DIAGNOSIS — Z88.0 ALLERGY STATUS TO PENICILLIN: ICD-10-CM

## 2024-11-21 DIAGNOSIS — F32.A DEPRESSION, UNSPECIFIED: ICD-10-CM

## 2024-11-21 DIAGNOSIS — B34.9 VIRAL INFECTION, UNSPECIFIED: ICD-10-CM

## 2024-11-21 DIAGNOSIS — J45.909 UNSPECIFIED ASTHMA, UNCOMPLICATED: ICD-10-CM

## 2024-11-21 DIAGNOSIS — Z91.51 PERSONAL HISTORY OF SUICIDAL BEHAVIOR: ICD-10-CM

## 2024-11-21 DIAGNOSIS — Z88.1 ALLERGY STATUS TO OTHER ANTIBIOTIC AGENTS: ICD-10-CM

## 2024-11-21 DIAGNOSIS — J20.9 ACUTE BRONCHITIS, UNSPECIFIED: ICD-10-CM

## 2024-11-21 DIAGNOSIS — M51.34 OTHER INTERVERTEBRAL DISC DEGENERATION, THORACIC REGION: ICD-10-CM
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
## 2024-11-21 DIAGNOSIS — N93.8 OTHER SPECIFIED ABNORMAL UTERINE AND VAGINAL BLEEDING: ICD-10-CM

## 2024-11-21 DIAGNOSIS — F41.8 OTHER SPECIFIED ANXIETY DISORDERS: ICD-10-CM
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
## 2024-11-21 DIAGNOSIS — J45.901 UNSPECIFIED ASTHMA WITH (ACUTE) EXACERBATION: ICD-10-CM

## 2024-11-21 DIAGNOSIS — E11.40 TYPE 2 DIABETES MELLITUS WITH DIABETIC NEUROPATHY, UNSPECIFIED: ICD-10-CM

## 2024-11-21 DIAGNOSIS — E11.65 TYPE 2 DIABETES MELLITUS WITH HYPERGLYCEMIA: ICD-10-CM

## 2024-11-21 DIAGNOSIS — T85.614A BREAKDOWN (MECHANICAL) OF INSULIN PUMP, INITIAL ENCOUNTER: ICD-10-CM

## 2024-11-21 DIAGNOSIS — B37.31 ACUTE CANDIDIASIS OF VULVA AND VAGINA: ICD-10-CM

## 2024-11-21 DIAGNOSIS — E66.9 OBESITY, UNSPECIFIED: ICD-10-CM

## 2024-11-21 DIAGNOSIS — K21.9 GASTRO-ESOPHAGEAL REFLUX DISEASE WITHOUT ESOPHAGITIS: ICD-10-CM

## 2024-11-21 DIAGNOSIS — N76.0 ACUTE VAGINITIS: ICD-10-CM

## 2024-11-21 DIAGNOSIS — I10 ESSENTIAL (PRIMARY) HYPERTENSION: ICD-10-CM

## 2024-11-21 DIAGNOSIS — Z96.41 PRESENCE OF INSULIN PUMP (EXTERNAL) (INTERNAL): ICD-10-CM

## 2024-11-21 RX ADMIN — DICYCLOMINE HYDROCHLORIDE 20 MILLIGRAM(S): 20 TABLET ORAL at 00:05

## 2024-11-21 NOTE — ED ADULT NURSE REASSESSMENT NOTE - NS ED NURSE REASSESS COMMENT FT1
RN reached out ot [facility], confirmed with Remee that patient can take medicaid cab to return to the resident instead of ambulance/ ambulette. Charge Nurse notified.

## 2024-11-21 NOTE — ED ADULT NURSE NOTE - NSFALLASSESSNEED_ED_ALL_ED
Patient was in ER and has catheter.  Wife said he  was supposed to be seen as soon as possible.  When should I schedule him?    no

## 2024-11-22 ENCOUNTER — APPOINTMENT (OUTPATIENT)
Dept: OTOLARYNGOLOGY | Facility: CLINIC | Age: 39
End: 2024-11-22

## 2024-11-25 ENCOUNTER — APPOINTMENT (OUTPATIENT)
Dept: PULMONOLOGY | Facility: CLINIC | Age: 39
End: 2024-11-25

## 2024-11-26 ENCOUNTER — APPOINTMENT (OUTPATIENT)
Dept: ENDOCRINOLOGY | Facility: CLINIC | Age: 39
End: 2024-11-26
Payer: MEDICAID

## 2024-11-26 ENCOUNTER — APPOINTMENT (OUTPATIENT)
Dept: ENDOCRINOLOGY | Facility: CLINIC | Age: 39
End: 2024-11-26

## 2024-11-26 PROCEDURE — 99441: CPT

## 2024-11-27 ENCOUNTER — APPOINTMENT (OUTPATIENT)
Dept: PSYCHIATRY | Facility: CLINIC | Age: 39
End: 2024-11-27

## 2024-11-27 ENCOUNTER — EMERGENCY (EMERGENCY)
Facility: HOSPITAL | Age: 39
LOS: 0 days | Discharge: HOME | End: 2024-11-27
Attending: EMERGENCY MEDICINE
Payer: MEDICAID

## 2024-11-27 VITALS
HEIGHT: 64 IN | SYSTOLIC BLOOD PRESSURE: 124 MMHG | OXYGEN SATURATION: 99 % | RESPIRATION RATE: 18 BRPM | DIASTOLIC BLOOD PRESSURE: 75 MMHG | HEART RATE: 72 BPM | TEMPERATURE: 97 F | WEIGHT: 190.04 LBS

## 2024-11-27 DIAGNOSIS — M54.50 LOW BACK PAIN, UNSPECIFIED: ICD-10-CM

## 2024-11-27 DIAGNOSIS — Z88.1 ALLERGY STATUS TO OTHER ANTIBIOTIC AGENTS: ICD-10-CM

## 2024-11-27 DIAGNOSIS — K21.9 GASTRO-ESOPHAGEAL REFLUX DISEASE WITHOUT ESOPHAGITIS: ICD-10-CM

## 2024-11-27 DIAGNOSIS — Z88.0 ALLERGY STATUS TO PENICILLIN: ICD-10-CM

## 2024-11-27 DIAGNOSIS — E11.40 TYPE 2 DIABETES MELLITUS WITH DIABETIC NEUROPATHY, UNSPECIFIED: ICD-10-CM

## 2024-11-27 DIAGNOSIS — Z88.8 ALLERGY STATUS TO OTHER DRUGS, MEDICAMENTS AND BIOLOGICAL SUBSTANCES: ICD-10-CM

## 2024-11-27 DIAGNOSIS — G89.29 OTHER CHRONIC PAIN: ICD-10-CM

## 2024-11-27 DIAGNOSIS — Z88.7 ALLERGY STATUS TO SERUM AND VACCINE: ICD-10-CM

## 2024-11-27 LAB
APPEARANCE UR: CLEAR — SIGNIFICANT CHANGE UP
BILIRUB UR-MCNC: NEGATIVE — SIGNIFICANT CHANGE UP
COLOR SPEC: YELLOW — SIGNIFICANT CHANGE UP
DIFF PNL FLD: NEGATIVE — SIGNIFICANT CHANGE UP
GLUCOSE UR QL: >=1000 MG/DL
KETONES UR-MCNC: NEGATIVE MG/DL — SIGNIFICANT CHANGE UP
LEUKOCYTE ESTERASE UR-ACNC: NEGATIVE — SIGNIFICANT CHANGE UP
NITRITE UR-MCNC: NEGATIVE — SIGNIFICANT CHANGE UP
PH UR: 5.5 — SIGNIFICANT CHANGE UP (ref 5–8)
PROT UR-MCNC: SIGNIFICANT CHANGE UP MG/DL
SP GR SPEC: >1.03 — HIGH (ref 1–1.03)
UROBILINOGEN FLD QL: 1 MG/DL — SIGNIFICANT CHANGE UP (ref 0.2–1)

## 2024-11-27 PROCEDURE — 81025 URINE PREGNANCY TEST: CPT

## 2024-11-27 PROCEDURE — 96372 THER/PROPH/DIAG INJ SC/IM: CPT

## 2024-11-27 PROCEDURE — 81003 URINALYSIS AUTO W/O SCOPE: CPT

## 2024-11-27 PROCEDURE — 99284 EMERGENCY DEPT VISIT MOD MDM: CPT

## 2024-11-27 PROCEDURE — 99283 EMERGENCY DEPT VISIT LOW MDM: CPT | Mod: 25

## 2024-11-27 RX ORDER — METHOCARBAMOL 500 MG/1
1500 TABLET ORAL ONCE
Refills: 0 | Status: COMPLETED | OUTPATIENT
Start: 2024-11-27 | End: 2024-11-27

## 2024-11-27 RX ORDER — METHOCARBAMOL 500 MG/1
2 TABLET ORAL
Qty: 30 | Refills: 0
Start: 2024-11-27 | End: 2024-12-01

## 2024-11-27 RX ORDER — DEXAMETHASONE 1.5 MG 1.5 MG/1
10 TABLET ORAL ONCE
Refills: 0 | Status: COMPLETED | OUTPATIENT
Start: 2024-11-27 | End: 2024-11-27

## 2024-11-27 RX ORDER — KETOROLAC TROMETHAMINE 30 MG/ML
30 INJECTION INTRAMUSCULAR; INTRAVENOUS ONCE
Refills: 0 | Status: DISCONTINUED | OUTPATIENT
Start: 2024-11-27 | End: 2024-11-27

## 2024-11-27 RX ORDER — LIDOCAINE HYDROCHLORIDE 40 MG/ML
1 SOLUTION TOPICAL
Qty: 1 | Refills: 0
Start: 2024-11-27 | End: 2024-12-01

## 2024-11-27 RX ORDER — LIDOCAINE HYDROCHLORIDE 40 MG/ML
1 SOLUTION TOPICAL ONCE
Refills: 0 | Status: COMPLETED | OUTPATIENT
Start: 2024-11-27 | End: 2024-11-27

## 2024-11-27 RX ADMIN — KETOROLAC TROMETHAMINE 30 MILLIGRAM(S): 30 INJECTION INTRAMUSCULAR; INTRAVENOUS at 12:44

## 2024-11-27 RX ADMIN — DEXAMETHASONE 1.5 MG 10 MILLIGRAM(S): 1.5 TABLET ORAL at 12:47

## 2024-11-27 RX ADMIN — METHOCARBAMOL 1500 MILLIGRAM(S): 500 TABLET ORAL at 12:43

## 2024-11-27 RX ADMIN — LIDOCAINE HYDROCHLORIDE 1 PATCH: 40 SOLUTION TOPICAL at 12:44

## 2024-11-27 NOTE — ED ADULT NURSE NOTE - NSFALLHARMRISKINTERV_ED_ALL_ED

## 2024-11-27 NOTE — ED ADULT NURSE NOTE - OBJECTIVE STATEMENT
Aox4 pt c/o back pain for approx 1 month and states her ability to ambulate has worsened. With family at bedside. Cup given for urine collection

## 2024-11-27 NOTE — ED PROVIDER NOTE - OBJECTIVE STATEMENT
Patient is a 39-year-old female PMH DM, GERD, anxiety, chronic lower back pain with neuropathy, sciatica, presents ED with complaints of low back pain for the past 2 weeks, exacerbated with movement.  Has not been taking anything for the pain.  Additionally patient's mom states that patient sleeps weird positions which she believes exacerbates the pain.  Denies fever, chills, CP, SOB, nausea, vomiting, abdominal pain, urinary symptoms, urinary fecal incontinence, paresthesias, focal weakness.

## 2024-11-27 NOTE — ED ADULT NURSE NOTE - COVID-19  TEST TYPE
Procedure has been cancelled due to COVID-19  LMOM for pt to call office to confirm receipt of message ans to get rescheduled for early Alysha  MOLECULAR PCR

## 2024-11-27 NOTE — ED ADULT NURSE REASSESSMENT NOTE - NS ED NURSE REASSESS COMMENT FT1
pt given urine cup, as per provider, awaiting urine collection and upreg prior to RN giving ordered meds

## 2024-11-27 NOTE — ED PROVIDER NOTE - ATTENDING APP SHARED VISIT CONTRIBUTION OF CARE
39-year-old female PMH as noted in chart presenting for evaluation of nontraumatic  lower back pain about 3 weeks.  Additional information obtained from the patient's mother who is at the bedside.  Mom states that her daughter developed pain while admitted in the hospital but "doctors did nothing".  Upon discharge back to her facility she has not taken anything for pain and decided to come to ED today.  No associated dysuria or hematuria, no fever or chills, no abdominal pain, nausea/ vomiting or any other additional acute complaints.  She denies any bowel bladder dysfunction. Well-nourished well-appearing female in no distress, bilateral lumbar paraspinal muscle tenderness to palpation, no midline spine tenderness or overlying skin changes, no unilateral leg swelling, no abdominal tenderness, no perineal anesthesia.  Plan: Analgesia, UA, reassess.

## 2024-11-27 NOTE — ED ADULT TRIAGE NOTE - PATIENT'S PREFERRED PRONOUN
Prasanth Flores    Age 54 y.o.    female    1965    MRN [id number]    11/25/2020  Arrival Time_____________  OR Time____________[address] [employer]     Procedure(s):  VIDEO ARTHROSCOPY RIGHT SHOULDER, ROTATOR CUFF REPAIR, BICEPS TENODESIS -BLOCK-                      General    Surgeon(s):  Amador Lau, MD       Phone [phone] ([place])     [facility][note 3] [facility]  Cell Work  _________________________________________________________________  _________________________________________________________________  _________________________________________________________________  _________________________________________________________________  _________________________________________________________________      PCP _____________________________ Phone_________________       H&P__________________Bringing    Chart            Epic  DOS     Called_______  EKG__________________Bringing    Chart            Epic  DOS     Called_______  LAB__________________ Bringing    Chart            Epic  DOS     Called_______  Cardiac Clearance_______Bringing    Chart            Epic      DOS       Called_______    Cardiologist________________________ Phone___________________________      ? Shinto concerns / Waiver on Chart            PAT Communications_____________  ? Pre-op Instructions Given [place]          ______________________________  ? Directions to Surgery Center                          ______________________________  ? Transportation Home_______________      _______________________________  ?  Crutches/Walker__________________        _______________________________      ________Pre-op Orders   _______Transcribed    _______Wt.  ________Pharmacy          _______SCD  ______VTE     ______B[employer]ocker  ________Consent             ________TED Akira Belch Her/She

## 2024-11-27 NOTE — ED PROVIDER NOTE - CLINICAL SUMMARY MEDICAL DECISION MAKING FREE TEXT BOX
39-year-old female with low back pain for several weeks.  No associated bowel /bladder dysfunction, no abdominal pain, no new focal weakness or paresthesias.  Patient has not taken anything for pain over the past several weeks.  Vital signs and prior records reviewed.  Collateral information obtained from the patient's mother.  Pain meds given in ED, patient reported improvement in pain, already has a follow-up with physical therapy.  Prescription for medication was sent to her pharmacy, she was advised to keep her appointment, strict return precautions given.  Both mother and patient were given opportunity to ask questions, verbalized understanding and are amenable with the DC plan.

## 2024-11-27 NOTE — ED PROVIDER NOTE - NSFOLLOWUPINSTRUCTIONS_ED_ALL_ED_FT
Follow up with your PMD this week.    Take your medications as prescribed. Take Motrin and Tylenol as needed.     Our Emergency Department Referral Coordinators will be reaching out to you in the next 24-48 hours from 9:00am to 5:00pm with a follow up appointment. Please expect a phone call from the hospital in that time frame. If you do not receive a call or if you have any questions or concerns, you can reach them at   [phone].     Back Pain    Back pain is very common in adults. The cause of back pain is rarely dangerous and the pain often gets better over time. The cause of your back pain may not be known and may include strain of muscles or ligaments, degeneration of the spinal disks, or arthritis. Occasionally the pain may radiate down your leg(s). Over-the-counter medicines to reduce pain and inflammation are often the most helpful. Stretching and remaining active frequently helps the healing process.     SEEK IMMEDIATE MEDICAL CARE IF YOU HAVE THE FOLLOWING SYMPTOMS: bowel or bladder control problems, unusual weakness or numbness in your arms or legs, nausea or vomiting, abdominal pain, fever, dizziness/lightheadedness.

## 2024-11-27 NOTE — ED PROVIDER NOTE - PROGRESS NOTE DETAILS
CR: Patient reassessed, reporting improvement of symptoms.  Prescription sent to pharmacy.  Patient to follow-up with PMD.  Return precautions given.

## 2024-11-27 NOTE — ED ADULT NURSE NOTE - NS TRANSFER PATIENT BELONGINGS
[FreeTextEntry1] : Headaches:\par Worse with stress, avoids meds\par Sleep hygeine measures discussed. \par \par Obesity:\par Regular physical activity strongly encouraged\par \par Stress/anxiety:\par Reports lifelong issue.  Has learned to manage it better with age.  Worse currently d/t struggle with job search and upcoming move.  \par Denies panic attacks. \par Therapy discussed, declines for now.  Will f/u with worsening.  \par Meditation, planning, job search strategies discussed. \par \par HM:\par Dental - UTD \par tdap - 2018\par Flu shot - would like to do today\par COVID vaccine - not yet, considering, encouraged\par check labs\par  Clothing

## 2024-11-27 NOTE — ED PROVIDER NOTE - PATIENT PORTAL LINK FT
You can access the FollowMyHealth Patient Portal offered by [facility] by registering at the following website: [email or web address]. By joining [email or web address]’s FollowMyHealth portal, you will also be able to view your health information using other applications (apps) compatible with our system.

## 2024-11-27 NOTE — ED ADULT TRIAGE NOTE - CHIEF COMPLAINT QUOTE
Pt c/o generalized back pain x1 month. Pt hospitalized x1 month ago, was bedrest for extended period of time and now c/o worsening back pain and inability to ambulate.

## 2024-11-27 NOTE — ED PROVIDER NOTE - PHYSICAL EXAMINATION
VITAL SIGNS: I have reviewed nursing notes and confirm.  CONSTITUTIONAL: In no acute distress.  SKIN: Skin exam is warm and dry.  HEAD: Normocephalic; atraumatic.  EYES: PERRL, EOM intact; conjunctiva and sclera clear.  NECK: Supple; non tender.  CARD: S1, S2; Regular rate and rhythm.  RESP: CTAB. Speaking in full sentences.   ABD: Normal bowel sounds; soft; non-distended; non-tender; No rebound or guarding. No CVA tenderness.  BACK: BL lumbar paraspinal TTP, no overlying skin changes. No midline TTP. +straight leg test BL.   EXT: Normal ROM. No LE edema.   NEURO: Alert, oriented. Grossly unremarkable. No focal deficits.

## 2024-11-27 NOTE — ED ADULT NURSE NOTE - NS ED NOTE  TALK SOMEONE YN
well developed, well nourished , in no acute distress , ambulating without difficulty , normal communication ability  No

## 2024-12-02 ENCOUNTER — APPOINTMENT (OUTPATIENT)
Dept: PODIATRY | Facility: CLINIC | Age: 39
End: 2024-12-02

## 2024-12-02 ENCOUNTER — APPOINTMENT (OUTPATIENT)
Dept: GASTROENTEROLOGY | Facility: CLINIC | Age: 39
End: 2024-12-02
Payer: MEDICAID

## 2024-12-02 VITALS
BODY MASS INDEX: 33.29 KG/M2 | DIASTOLIC BLOOD PRESSURE: 72 MMHG | HEIGHT: 64 IN | WEIGHT: 195 LBS | SYSTOLIC BLOOD PRESSURE: 132 MMHG | HEART RATE: 85 BPM

## 2024-12-02 DIAGNOSIS — K21.9 GASTRO-ESOPHAGEAL REFLUX DISEASE W/OUT ESOPHAGITIS: ICD-10-CM

## 2024-12-02 DIAGNOSIS — R10.9 UNSPECIFIED ABDOMINAL PAIN: ICD-10-CM

## 2024-12-02 DIAGNOSIS — R79.89 OTHER SPECIFIED ABNORMAL FINDINGS OF BLOOD CHEMISTRY: ICD-10-CM

## 2024-12-02 PROCEDURE — 99214 OFFICE O/P EST MOD 30 MIN: CPT

## 2024-12-02 RX ORDER — METHOCARBAMOL 500 MG/1
500 TABLET, FILM COATED ORAL
Refills: 0 | Status: ACTIVE | COMMUNITY

## 2024-12-02 NOTE — BH SAFETY PLAN - SUICIDE PREVENTION LIFELINE PHONES
Outreach attempts to coordinate scheduling on the patient's Service to Pain order have been conducted. I have attempted the three times to get in contact with patient but was unsuccessful. Closing order as \"unable to contact. Order will remain active for one year.    Please contact Jazmin if further coordination is needed.   
Suicide Prevention Lifeline Phone: [phone])

## 2024-12-03 ENCOUNTER — INPATIENT (INPATIENT)
Facility: HOSPITAL | Age: 39
LOS: 6 days | Discharge: HOME | DRG: 754 | End: 2024-12-10
Attending: PSYCHIATRY & NEUROLOGY | Admitting: PSYCHIATRY & NEUROLOGY
Payer: MEDICAID

## 2024-12-03 ENCOUNTER — APPOINTMENT (OUTPATIENT)
Dept: PSYCHIATRY | Facility: CLINIC | Age: 39
End: 2024-12-03

## 2024-12-03 VITALS
OXYGEN SATURATION: 98 % | DIASTOLIC BLOOD PRESSURE: 74 MMHG | HEART RATE: 83 BPM | WEIGHT: 190.04 LBS | RESPIRATION RATE: 16 BRPM | HEIGHT: 64 IN | TEMPERATURE: 98 F | SYSTOLIC BLOOD PRESSURE: 124 MMHG

## 2024-12-03 DIAGNOSIS — F33.2 MAJOR DEPRESSIVE DISORDER, RECURRENT SEVERE WITHOUT PSYCHOTIC FEATURES: ICD-10-CM

## 2024-12-03 DIAGNOSIS — F32.A DEPRESSION, UNSPECIFIED: ICD-10-CM

## 2024-12-03 LAB
ALBUMIN SERPL ELPH-MCNC: 4.1 G/DL — SIGNIFICANT CHANGE UP (ref 3.5–5.2)
ALP SERPL-CCNC: 159 U/L — HIGH (ref 30–115)
ALT FLD-CCNC: 38 U/L — SIGNIFICANT CHANGE UP (ref 0–41)
ANION GAP SERPL CALC-SCNC: 11 MMOL/L — SIGNIFICANT CHANGE UP (ref 7–14)
APAP SERPL-MCNC: <5 UG/ML — LOW (ref 10–30)
AST SERPL-CCNC: 20 U/L — SIGNIFICANT CHANGE UP (ref 0–41)
BASOPHILS # BLD AUTO: 0.07 K/UL — SIGNIFICANT CHANGE UP (ref 0–0.2)
BASOPHILS NFR BLD AUTO: 0.6 % — SIGNIFICANT CHANGE UP (ref 0–1)
BILIRUB SERPL-MCNC: <0.2 MG/DL — SIGNIFICANT CHANGE UP (ref 0.2–1.2)
BUN SERPL-MCNC: 8 MG/DL — LOW (ref 10–20)
CALCIUM SERPL-MCNC: 9 MG/DL — SIGNIFICANT CHANGE UP (ref 8.4–10.5)
CHLORIDE SERPL-SCNC: 100 MMOL/L — SIGNIFICANT CHANGE UP (ref 98–110)
CO2 SERPL-SCNC: 23 MMOL/L — SIGNIFICANT CHANGE UP (ref 17–32)
CREAT SERPL-MCNC: 0.6 MG/DL — LOW (ref 0.7–1.5)
EGFR: 117 ML/MIN/1.73M2 — SIGNIFICANT CHANGE UP
EOSINOPHIL # BLD AUTO: 0.1 K/UL — SIGNIFICANT CHANGE UP (ref 0–0.7)
EOSINOPHIL NFR BLD AUTO: 0.9 % — SIGNIFICANT CHANGE UP (ref 0–8)
ETHANOL SERPL-MCNC: <10 MG/DL — SIGNIFICANT CHANGE UP
FLUAV AG NPH QL: SIGNIFICANT CHANGE UP
FLUBV AG NPH QL: SIGNIFICANT CHANGE UP
GLUCOSE BLDC GLUCOMTR-MCNC: 197 MG/DL — HIGH (ref 70–99)
GLUCOSE BLDC GLUCOMTR-MCNC: 244 MG/DL — HIGH (ref 70–99)
GLUCOSE SERPL-MCNC: 308 MG/DL — HIGH (ref 70–99)
HCG SERPL QL: NEGATIVE — SIGNIFICANT CHANGE UP
HCT VFR BLD CALC: 35 % — LOW (ref 37–47)
HGB BLD-MCNC: 10.2 G/DL — LOW (ref 12–16)
IMM GRANULOCYTES NFR BLD AUTO: 0.5 % — HIGH (ref 0.1–0.3)
LYMPHOCYTES # BLD AUTO: 19.3 % — LOW (ref 20.5–51.1)
LYMPHOCYTES # BLD AUTO: 2.12 K/UL — SIGNIFICANT CHANGE UP (ref 1.2–3.4)
MCHC RBC-ENTMCNC: 22.4 PG — LOW (ref 27–31)
MCHC RBC-ENTMCNC: 29.1 G/DL — LOW (ref 32–37)
MCV RBC AUTO: 76.9 FL — LOW (ref 81–99)
MONOCYTES # BLD AUTO: 0.73 K/UL — HIGH (ref 0.1–0.6)
MONOCYTES NFR BLD AUTO: 6.6 % — SIGNIFICANT CHANGE UP (ref 1.7–9.3)
NEUTROPHILS # BLD AUTO: 7.91 K/UL — HIGH (ref 1.4–6.5)
NEUTROPHILS NFR BLD AUTO: 72.1 % — SIGNIFICANT CHANGE UP (ref 42.2–75.2)
NRBC # BLD: 0 /100 WBCS — SIGNIFICANT CHANGE UP (ref 0–0)
PLATELET # BLD AUTO: 516 K/UL — HIGH (ref 130–400)
PMV BLD: 9.8 FL — SIGNIFICANT CHANGE UP (ref 7.4–10.4)
POTASSIUM SERPL-MCNC: 3.9 MMOL/L — SIGNIFICANT CHANGE UP (ref 3.5–5)
POTASSIUM SERPL-SCNC: 3.9 MMOL/L — SIGNIFICANT CHANGE UP (ref 3.5–5)
PROT SERPL-MCNC: 7.5 G/DL — SIGNIFICANT CHANGE UP (ref 6–8)
RBC # BLD: 4.55 M/UL — SIGNIFICANT CHANGE UP (ref 4.2–5.4)
RBC # FLD: 16.7 % — HIGH (ref 11.5–14.5)
RSV RNA NPH QL NAA+NON-PROBE: SIGNIFICANT CHANGE UP
SALICYLATES SERPL-MCNC: <0.3 MG/DL — LOW (ref 4–30)
SARS-COV-2 RNA SPEC QL NAA+PROBE: SIGNIFICANT CHANGE UP
SODIUM SERPL-SCNC: 134 MMOL/L — LOW (ref 135–146)
WBC # BLD: 10.99 K/UL — HIGH (ref 4.8–10.8)
WBC # FLD AUTO: 10.99 K/UL — HIGH (ref 4.8–10.8)

## 2024-12-03 PROCEDURE — 97162 PT EVAL MOD COMPLEX 30 MIN: CPT | Mod: GP

## 2024-12-03 PROCEDURE — 99285 EMERGENCY DEPT VISIT HI MDM: CPT

## 2024-12-03 PROCEDURE — 82962 GLUCOSE BLOOD TEST: CPT

## 2024-12-03 RX ORDER — METOPROLOL TARTRATE 100 MG/1
25 TABLET, FILM COATED ORAL THREE TIMES A DAY
Refills: 0 | Status: DISCONTINUED | OUTPATIENT
Start: 2024-12-03 | End: 2024-12-10

## 2024-12-03 RX ORDER — CLONAZEPAM 0.12 MG/1
0.5 TABLET, ORALLY DISINTEGRATING ORAL
Refills: 0 | Status: DISCONTINUED | OUTPATIENT
Start: 2024-12-03 | End: 2024-12-04

## 2024-12-03 RX ORDER — 0.9 % SODIUM CHLORIDE 0.9 %
1000 INTRAVENOUS SOLUTION INTRAVENOUS
Refills: 0 | Status: DISCONTINUED | OUTPATIENT
Start: 2024-12-03 | End: 2024-12-10

## 2024-12-03 RX ORDER — AMLODIPINE BESYLATE 10 MG/1
5 TABLET ORAL DAILY
Refills: 0 | Status: DISCONTINUED | OUTPATIENT
Start: 2024-12-03 | End: 2024-12-10

## 2024-12-03 RX ORDER — IBUPROFEN 200 MG
400 TABLET ORAL EVERY 6 HOURS
Refills: 0 | Status: DISCONTINUED | OUTPATIENT
Start: 2024-12-03 | End: 2024-12-06

## 2024-12-03 RX ORDER — SERTRALINE HYDROCHLORIDE 100 MG/1
25 TABLET, FILM COATED ORAL DAILY
Refills: 0 | Status: DISCONTINUED | OUTPATIENT
Start: 2024-12-03 | End: 2024-12-03

## 2024-12-03 RX ORDER — INSULIN GLARGINE 100 [IU]/ML
15 INJECTION, SOLUTION SUBCUTANEOUS AT BEDTIME
Refills: 0 | Status: DISCONTINUED | OUTPATIENT
Start: 2024-12-03 | End: 2024-12-04

## 2024-12-03 RX ORDER — PANTOPRAZOLE SODIUM 40 MG/1
40 TABLET, DELAYED RELEASE ORAL
Refills: 0 | Status: DISCONTINUED | OUTPATIENT
Start: 2024-12-03 | End: 2024-12-10

## 2024-12-03 RX ORDER — SERTRALINE HYDROCHLORIDE 100 MG/1
75 TABLET, FILM COATED ORAL DAILY
Refills: 0 | Status: DISCONTINUED | OUTPATIENT
Start: 2024-12-03 | End: 2024-12-07

## 2024-12-03 RX ORDER — GLUCAGON INJECTION, SOLUTION 0.5 MG/.1ML
1 INJECTION, SOLUTION SUBCUTANEOUS ONCE
Refills: 0 | Status: DISCONTINUED | OUTPATIENT
Start: 2024-12-03 | End: 2024-12-10

## 2024-12-03 RX ORDER — ACETAMINOPHEN 500MG 500 MG/1
650 TABLET, COATED ORAL EVERY 6 HOURS
Refills: 0 | Status: DISCONTINUED | OUTPATIENT
Start: 2024-12-03 | End: 2024-12-06

## 2024-12-03 RX ADMIN — INSULIN GLARGINE 15 UNIT(S): 100 INJECTION, SOLUTION SUBCUTANEOUS at 20:54

## 2024-12-03 RX ADMIN — AMLODIPINE BESYLATE 5 MILLIGRAM(S): 10 TABLET ORAL at 20:53

## 2024-12-03 RX ADMIN — METOPROLOL TARTRATE 25 MILLIGRAM(S): 100 TABLET, FILM COATED ORAL at 20:52

## 2024-12-03 NOTE — ED BEHAVIORAL HEALTH ASSESSMENT NOTE - MEDICAL ISSUES AND PLAN (INCLUDE STANDING AND PRN MEDICATION)
awaiting medicine/endo recs for diabetic management while inpatient. Continue metoprolol 25 TID, amlodipine 5 daily.

## 2024-12-03 NOTE — ED ADULT TRIAGE NOTE - PATIENT'S GENDER IDENTITY
PAST MEDICAL HISTORY:  Anxiety     Carotid stenosis, left     Deviated septum     Essential hypertension     Fatty liver disease, nonalcoholic     Gall stones gall bladder removed    H/O carotid stenosis     Heart murmur     Hiatal hernia with GERD     Hyperlipidemia, unspecified hyperlipidemia type     Hypothyroidism, unspecified type     Irritable bowel syndrome with both constipation and diarrhea     Joint pain of lower limb bilateral    Lymphedema     Mitral valve insufficiency, unspecified etiology     Morbid obesity due to excess calories     Peripheral edema bilateral    Urinary tract infection with hematuria, site unspecified frequent.  Presently on antibiotics     Female monthly

## 2024-12-03 NOTE — BH PATIENT PROFILE - HOME MEDICATIONS
Lidoderm 5% topical film , Apply topically to affected area once a day as needed for back pain You may apply 1 patch to your back ONCE in 24 hours. You may wear this patch for up to 12 hours, followed by a 12 hour period WITHOUT a patch.  methocarbamol 500 mg oral tablet , 2 tab(s) orally every 8 hours as needed for back pain Do not drive or operate any heavy machinery on this medication, as it can cause drowsiness.  insulin lispro 100 units/mL injectable solution , 18 unit(s) injectable 3 times a day (before meals)  Basaglar KwikPen 100 units/mL subcutaneous solution , 60 subcutaneous once a day  clotrimazole-betamethasone dipropionate 1%-0.05% topical cream , Apply topically to affected area 2 times a day  terconazole 0.4% vaginal cream , 1 applicatorful intravaginally once a day  rimegepant 75 mg oral tablet, disintegrating , 1 tab(s) orally once a day as needed for migraine  sertraline 25 mg oral tablet , 1 tab(s) orally once a day  Norvasc 5 mg oral tablet , 1 tab(s) orally once a day  metoprolol tartrate 25 mg oral tablet , 1 tab(s) orally 3 times a day  famotidine 40 mg oral tablet , 1 tab(s) orally once a day  azelastine nasal , 2 spray(s) 2 times a day

## 2024-12-03 NOTE — ED BEHAVIORAL HEALTH ASSESSMENT NOTE - DESCRIPTION
see HPI Patient calm, cooperative in triage.     Vital Signs Last 24 Hrs  T(C): 36.8 (31 Mar 2024 10:27), Max: 36.8 (31 Mar 2024 10:27)  T(F): 98.2 (31 Mar 2024 10:27), Max: 98.2 (31 Mar 2024 10:27)  HR: 94 (31 Mar 2024 10:27) (94 - 94)  BP: 135/76 (31 Mar 2024 10:27) (135/76 - 135/76)  BP(mean): --  RR: 16 (31 Mar 2024 10:27) (16 - 16)  SpO2: 97% (31 Mar 2024 10:27) (97% - 97%)    Parameters below as of 31 Mar 2024 10:27  Patient On (Oxygen Delivery Method): room air T1D -- diagnosed at 16 mo, diabetic neuropathy, HTN, chronic back pain, migraine headaches calm, cooperative, nad  glucose elevated

## 2024-12-03 NOTE — ED PROVIDER NOTE - OBJECTIVE STATEMENT
9 year old  female, domiciled at [facility],  past medical history of T1D, diabetic neuropathy, HTN, chronic back pain, migraine headaches, pseudoseizures, with past psychiatric history of depression/anxiety and cluster B personality disorder, 8 previous psychiatric admissions, multiple previous suicide attempts (remote hx of once by taking a capful of acetone, another by drinking hydrogen peroxide, another attempt by taking half a bottle of ibuprofen; most recent SA about a year ago by ingesting Pine-Sol, no known medical consequences), no legal or violence hx, denies substance use, comes in for worsening depression. presents ED with complaints sadness, frequent crying, depression.  Patient states that she recently was increased in her dose of Zoloft to 50 mg at the end of October.  Patient states he has not felt a change in mood since increased dose.  She denies any suicidal ideations or plan at this time.  No homicidal ideations.  No drug or substance use.  No alcohol use.  No auditory or visual hallucinations.

## 2024-12-03 NOTE — ED ADULT TRIAGE NOTE - PATIENT'S PREFERRED PRONOUN
Her/She How Severe Are Your Spot(S)?: moderate What Is The Reason For Today's Visit?: Full Body Skin Examination What Is The Reason For Today's Visit? (Being Monitored For X): concerning skin lesions on an annual basis

## 2024-12-03 NOTE — BH PATIENT PROFILE - NSVRISKFUTURESTRESS_PSY_ALL_CORE
[facility] MEDICINE DISCHARGE SUMMARY NOTE    Patient: Edwige Bishop Discharge Date: 7/10/2023   YOB: 1956 Admission Date: 7/9/2023 11:27 AM   MRN: [id number] Admission Length: 1 day(s)     PCP: Connor Salinas MD    Admitting Physician: Mario Monae MD Discharge Physician: Mario Monae MD       Admission Information     Admission Diagnoses:   Ureteral stent displacement, initial encounter (CMD) [T83.122A]  Urinary tract infection with hematuria, site unspecified [N39.0, R31.9]    Admission Condition: fair  Condition at Discharge:  fair     Primary Discharge Diagnoses:   Ureteral stent displacement, initial encounter (CMD)  (primary encounter diagnosis)  Urinary tract infection with hematuria, site unspecified    Secondary Discharge Diagnoses:   Patient Active Problem List   Diagnosis   • Hyperlipidemia   • Acquired hypothyroidism   • Nephrolithiasis   • Hypercalciuria   • Mixed hearing loss, unilateral   • History of left mastoidectomy   • Ureteral stent displacement, initial encounter (CMD)       Code status/Goals of care discussion:  Full Resuscitation         TCM Follow up      Radiology tests requiring followup: None     Other tests/treatment requiring follow up : None       Studies pending at time of discharge: None   Unresulted Labs (From admission, onward)     Start     Ordered    07/10/23 0600  Basic Metabolic Panel  AM DRAW,   Routine       07/09/23 1627    07/10/23 0600  CBC with Automated Differential  AM DRAW,   Routine       07/09/23 1627              Unresulted Procedure (From admission, onward)    None            Needs further Goals of care discussion No     Discharge Medications:      Summary of your Discharge Medications      Take these Medications      Details   albuterol 108 (90 Base) MCG/ACT inhaler   Inhale 2 puffs into the lungs every 4 hours as needed for Shortness of Breath or Wheezing.     atorvastatin 40 MG tablet  Commonly known as: LIPITOR   TAKE 1 TABLET  EVERY DAY     benzonatate 200 MG capsule  Commonly known as: TESSALON PERLES   Take 1 capsule by mouth 3 times daily as needed for Cough.     * estradiol 0.1 MG/GM vaginal cream  Commonly known as: ESTRACE   every Tues,Thurs.     * estradiol 10 MCG vaginal tablet  Commonly known as: VAGIFEM   Place 1 tablet vaginally daily for 2 weeks then 1 twice a week     levothyroxine 150 MCG tablet  Commonly known as: Levo-T   Take 1 tablet by mouth daily.  Comment: 175->150mcg daily 7/2/23     potassium chloride 20 MEQ ER tablet  Commonly known as: K-TAB   Take 1 tablet by mouth daily for low potassium     sulfamethoxazole-trimethoprim 800-160 MG per tablet  Commonly known as: Bactrim DS   Take 1 tablet by mouth in the morning and 1 tablet in the evening. Do all this for 5 days.     tamsulosin 0.4 MG Cap  Commonly known as: FLOMAX   Take 0.4 mg by mouth daily.         * This list has 2 medication(s) that are the same as other medications prescribed for you. Read the directions carefully, and ask your doctor or other care provider to review them with you.                    Home Health referral:  No                Follow-up      Urology at [place] in 1-2 weeks    Future Appointments   Date Time Provider Department Center   8/1/2023  9:20 AM B  [facility] 2 [id number]   9/6/2023  9:30 AM Umm Roberts MD [facility]         Discharge Instructions     Diet: resume prior diet Activity:  activity as tolerated   Wound Care: none needed Disposition: Home     Other instructions:     None       Hospital Course   Admission Narrative:    Edwige Bishop is a 67 year old female with hypothyroidism, HL, presents with fever and R flank pain. Pt had nonobstructing renal stones, and underwent R ureteroscopy with laser lithotripsy 2 days prior to admission in [place]. Pt with culture with pansensitive Klebsiella at that time (to augmentin), placed on amoxicillin postop. Pt had stent placement, and discharged. Has since had R flank pain,  and fevers. Workup revealing of proximal portion of the right double ureteral catheter is low in position. Right hydroureteronephrosis. Bilateral renal calculi. Urology consulted, underwent cystourethroscopy and R ureteral stent placement. Given empiric ceftriaxone, IVF. Urine culture with < 10,000 GNR. Pt to be discharged on 5 more days of bactrim. Pt discharged on 7/10/2023. At time of discharge, pt with pain well controlled, voiding, stooling, ambulating, tolerating diet, and with resolution of presenting symptoms. Pt will follow up with her urologist next week in [place].      Consultants:  urology        Discharge Physical Exam     Vital Signs:   Visit Vitals  /71 (BP Location: LUE - Left upper extremity)   Pulse (!) 56   Temp 97.2 °F (36.2 °C) (Oral)   Resp 16   Ht 5' 6\" (1.676 m)   Wt 97.4 kg (214 lb 11.7 oz)   SpO2 94%   BMI 34.66 kg/m²     General Appearance: Alert and oriented x3 cooperative, no distress, appears stated age  Head: Normocephalic, without obvious abnormality, atraumatic  Eyes: PERRL, conjunctiva/corneas clear, EOM's intact  Throat: Lips, mucosa, and tongue normal; Mucus membranes dry   Neck: Supple, symmetrical, trachea midline, no adenopathy;  Back: Symmetric, no curvature, ROM normal  Lungs: Clear to auscultation bilaterally, respirations unlabored  Chest Wall: No tenderness or deformity  Heart: Regular rate and rhythm, S1 and S2 normal, no murmur, rub or gallop  Abdomen: Soft, nontender; no masses, no hepato- or splenomegaly  Extremities: Extremities normal, atraumatic, no cyanosis or edema  Skin: Skin color, texture, turgor normal, no rashes or lesions    Wt Readings from Last 3 Encounters:   07/09/23 97.4 kg (214 lb 11.7 oz)   07/09/23 100.8 kg (222 lb 3.2 oz)   06/30/23 97.7 kg (215 lb 6.4 oz)           Time taken to coordinate discharge care:  More than 30 minutes.    Discharge instructions, medications and followup appointment were discussed with patient/family and after visit  summary was provided.     Mario Monae MD  7/10/2023  1:04 PM    CC:   Connor Salinas MD         Maybe/Moderate Yes

## 2024-12-03 NOTE — BH PATIENT PROFILE - NSPROPOAPRESSUREINJURY_GEN_A_NUR
Dear Chiki Horta,    We are sorry you are not feeling well. Based on the responses you provided, it is recommended that you be seen in-person in urgent care or the emergency department so we can better evaluate your symptoms. Please click here to find the nearest urgent care location to you.   You will not be charged for this Visit. Thank you for trusting us with your care.    CECILE AGUIRRE MD    
no

## 2024-12-03 NOTE — ED BEHAVIORAL HEALTH NOTE - BEHAVIORAL HEALTH NOTE
===================     PRE-HOSPITAL COURSE     ==================   SOURCE: ED notes: Armando Romero    DETAILS: Patient presenting with worsening depression.    ============   ED COURSE  ============   SOURCE: ED notes / Armando Romero.    ARRIVAL: BIB self for worsening depression/anxiety   BELONGINGS: All belongings appropriately confiscated. Pt is not on a 1:1   BEHAVIOR: Patient presenting calm and cooperative in the ED. Reportedly participating with all labs and request. Rn reported patient to be from a residence home, no SI or Hi reported. No hallucinations noted. Patient reportedly has good hygiene. Aox4.    TREATMENT: None   VISITORS: Reported family to be bedside.

## 2024-12-03 NOTE — BH PATIENT PROFILE - NSNUTRITIONASSESS_GEN_ALL_CORE
Patient does not exhibit any risk factors

## 2024-12-03 NOTE — ED PROVIDER NOTE - CLINICAL SUMMARY MEDICAL DECISION MAKING FREE TEXT BOX
39 year old  female, domiciled at [facility],  past medical history of T1D, diabetic neuropathy, HTN, chronic back pain, migraine headaches, pseudoseizures, with past psychiatric history of depression/anxiety and cluster B personality disorder, 8 previous psychiatric admissions, multiple previous suicide attempts (remote hx of once by taking a capful of acetone, another by drinking hydrogen peroxide, another attempt by taking half a bottle of ibuprofen; most recent SA about a year ago by ingesting Pine-Sol, no known medical consequences), no legal or violence hx, denies substance use, comes in for worsening depression. No SI/HI/AH/VH. No CP/SOB/abdominal pain. No n/v/c/d. On exam, pt in NAD, AAOx3, head NC/AT, CN II-XII intact, PEERL, EOMi, neck (-) midline tenderness, lungs CTA B/L, CV S1S2 regular, abdomen soft/NT/ND/(+)BS, ext (-) edema, motor 5/5x4, sensation intact, ambulating with steady gait. Labs done. Psyc evaluated pt and recommend voluntary admission. Medicine consulted as per psyc request for DM management.

## 2024-12-03 NOTE — ED BEHAVIORAL HEALTH ASSESSMENT NOTE - SUMMARY
38 yo woman, domiciled at [facility] Living with mother as roommate, PMH type 1 diabetes, HTN, chronic back pain, pseudoseizures per chart, and psychiatric history of Borderline Personality traits, MDD, numerous prior psychiatric hospitalizations (most recent April 2024), previous suicide attempts by ingesting substances like acetone or hydrogen peroxide, currently followed by psychiatrist Dr. Curiel, who self-presents seeking psychiatric admission for refractory symptoms.    Even though patient currently does not have any active suicidal ideation and appears to be very motivated to seek care, when discussing the possibility of returning to outpatient follow-up, the patient is very anxious about this potential plan and does not feel outpatient psychotropic management and therapy have been sufficient. She is currently dissatisfied with her care, describes progressively worsening depressive state and relative severe degree of mood sx, thoughts of self-harm several weeks ago (with a history of prior similar behaviors and suicide attempts). For these reasons she presents an elevated risk and would benefit from psychiatric hospitalization.

## 2024-12-03 NOTE — ED BEHAVIORAL HEALTH ASSESSMENT NOTE - OTHER PAST PSYCHIATRIC HISTORY (INCLUDE DETAILS REGARDING ONSET, COURSE OF ILLNESS, INPATIENT/OUTPATIENT TREATMENT)
Multiple previous psychiatric admissions (most recent to [facility] in June 2023)  In outpatient treatment with Dr. Philippe (psychiatrist), Kylee (therapist)  Currently on Lexapro, Amitriptyline, and Lamictal  Multiple past suicidal gestures per hpi

## 2024-12-03 NOTE — BH PATIENT PROFILE - FUNCTIONAL ASSESSMENT - BASIC MOBILITY 6.
4-calculated by average/Not able to assess (calculate score using [facility] averaging method)
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
## 2024-12-03 NOTE — ED PROVIDER NOTE - PHYSICAL EXAMINATION
VITAL SIGNS: I have reviewed nursing notes and confirm.  CONSTITUTIONAL: well-appearing, non-toxic, NAD  SKIN: Warm dry, normal skin turgor  HEAD: NCAT  EYES: EOMI, PERRLA, no scleral icterus  ENT: Moist mucous membranes, normal pharynx with no erythema or exudates  NECK: Supple; non tender. Full ROM. No cervical LAD  CARD: RRR, no murmurs, rubs or gallops  RESP: clear to ausculation b/l.  No rales, rhonchi, or wheezing.  ABD: soft, non-tender, non-distended, no rebound or guarding. No CVA tenderness  EXT: Full ROM, no bony tenderness, no pedal edema, no calf tenderness  NEURO: normal motor. normal sensory. CN II-XII intact. Cerebellar testing normal. Normal gait.  PSYCH: Cooperative, appropriate.

## 2024-12-03 NOTE — ED BEHAVIORAL HEALTH ASSESSMENT NOTE - NSTXRELFACTOR_PSY_ALL_CORE
Recent inpatient discharge/Hopeless about or dissatisfied with provider or treatment Hopeless about or dissatisfied with provider or treatment

## 2024-12-03 NOTE — ED PROVIDER NOTE - ATTENDING CONTRIBUTION TO CARE
Ania Mei is a 39 year old  female, domiciled at [facility],  past medical history of T1D, diabetic neuropathy, HTN, chronic back pain, migraine headaches, pseudoseizures, with past psychiatric history of depression/anxiety and cluster B personality disorder, 8 previous psychiatric admissions, multiple previous suicide attempts (remote hx of once by taking a capful of acetone, another by drinking hydrogen peroxide, another attempt by taking half a bottle of ibuprofen; most recent SA about a year ago by ingesting Pine-Sol, no known medical consequences), no legal or violence hx, denies substance use, comes in for worsening depression. No SI/HI/AH/VH. No CP/SOB/abdominal pain. No n/v/c/d. On exam, pt in NAD, AAOx3, head NC/AT, CN II-XII intact, PEERL, EOMi, neck (-) midline tenderness, lungs CTA B/L, CV S1S2 regular, abdomen soft/NT/ND/(+)BS, ext (-) edema, motor 5/5x4, sensation intact, ambulating with steady gait. Labs done. WIll get psyc consult and reevaluate pt.

## 2024-12-03 NOTE — ED BEHAVIORAL HEALTH ASSESSMENT NOTE - NSACTIVEVENT_PSY_ALL_CORE
Chronic pain or other acute medical condition Triggering events leading to humiliation, shame, and/or despair (e.g., Loss of relationship, financial or health status) (real or anticipated)/Chronic pain or other acute medical condition

## 2024-12-03 NOTE — ED BEHAVIORAL HEALTH ASSESSMENT NOTE - CURRENT MEDICATION
Ferrous sulfate 325mg daily  Lexapro 20mg daily  Protonix 40mg daily  Lopressor 25mg TID  amitriptyline 75mg QHS  Lamictal 50mg QHS  Albuterol pump q8h prn  Zofran 4mg q8h prn  Motrin prn  Insulin pump zoloft 50, metoprolol 25 TID, amlodipine 5, insulin pump?, Robaxin

## 2024-12-03 NOTE — ED BEHAVIORAL HEALTH ASSESSMENT NOTE - NSSUICPROTFACT_PSY_ALL_CORE
None known Responsibility to children, family, or others/Fear of death or the actual act of killing self/Cultural, spiritual and/or moral attitudes against suicide

## 2024-12-03 NOTE — ED PROVIDER NOTE - PROGRESS NOTE DETAILS
MS–spoke with medicine hospitalist who recommended starting the patient on 15 units of Lantus daily, and 5 units of lispro with meals (3 times per day).  He recommended adding a sliding scale.  Also recommended consult to medicine for further evaluation.  Psychiatry attending aware and will follow-up.

## 2024-12-03 NOTE — ED BEHAVIORAL HEALTH ASSESSMENT NOTE - NSPRESENTSXS_PSY_ALL_CORE
Depressed mood/Anhedonia/Hopelessness or despair Depressed mood/Anhedonia/Hopelessness or despair/Severe anxiety, agitation or panic

## 2024-12-03 NOTE — ED BEHAVIORAL HEALTH ASSESSMENT NOTE - ADDITIONAL DETAILS ALL
Patient with passive suicidal ideation. Multiple previous attempts as above. prior suicide attempts per HPI, hx of ingesting household chemicals

## 2024-12-04 ENCOUNTER — NON-APPOINTMENT (OUTPATIENT)
Age: 39
End: 2024-12-04

## 2024-12-04 DIAGNOSIS — F60.3 BORDERLINE PERSONALITY DISORDER: ICD-10-CM

## 2024-12-04 LAB
GLUCOSE BLDC GLUCOMTR-MCNC: 124 MG/DL — HIGH (ref 70–99)
GLUCOSE BLDC GLUCOMTR-MCNC: 277 MG/DL — HIGH (ref 70–99)
GLUCOSE BLDC GLUCOMTR-MCNC: 306 MG/DL — HIGH (ref 70–99)
GLUCOSE BLDC GLUCOMTR-MCNC: 362 MG/DL — HIGH (ref 70–99)
GLUCOSE BLDC GLUCOMTR-MCNC: 453 MG/DL — CRITICAL HIGH (ref 70–99)

## 2024-12-04 PROCEDURE — 99232 SBSQ HOSP IP/OBS MODERATE 35: CPT

## 2024-12-04 RX ORDER — INSULIN GLARGINE 100 [IU]/ML
45 INJECTION, SOLUTION SUBCUTANEOUS AT BEDTIME
Refills: 0 | Status: DISCONTINUED | OUTPATIENT
Start: 2024-12-04 | End: 2024-12-05

## 2024-12-04 RX ORDER — HYDROXYZINE HYDROCHLORIDE 25 MG/1
25 TABLET, FILM COATED ORAL EVERY 6 HOURS
Refills: 0 | Status: DISCONTINUED | OUTPATIENT
Start: 2024-12-04 | End: 2024-12-10

## 2024-12-04 RX ORDER — HALOPERIDOL 2 MG
5 TABLET ORAL EVERY 6 HOURS
Refills: 0 | Status: DISCONTINUED | OUTPATIENT
Start: 2024-12-04 | End: 2024-12-10

## 2024-12-04 RX ORDER — SERTRALINE HYDROCHLORIDE 50 MG/1
2 TABLET, FILM COATED ORAL
Refills: 0 | DISCHARGE

## 2024-12-04 RX ORDER — FAMOTIDINE 20 MG/1
40 TABLET, FILM COATED ORAL AT BEDTIME
Refills: 0 | Status: DISCONTINUED | OUTPATIENT
Start: 2024-12-04 | End: 2024-12-10

## 2024-12-04 RX ORDER — OMEPRAZOLE 20 MG/1
1 CAPSULE, DELAYED RELEASE ORAL
Refills: 0 | DISCHARGE

## 2024-12-04 RX ADMIN — INSULIN GLARGINE 45 UNIT(S): 100 INJECTION, SOLUTION SUBCUTANEOUS at 20:26

## 2024-12-04 RX ADMIN — AMLODIPINE BESYLATE 5 MILLIGRAM(S): 10 TABLET ORAL at 08:29

## 2024-12-04 RX ADMIN — METOPROLOL TARTRATE 25 MILLIGRAM(S): 100 TABLET, FILM COATED ORAL at 09:01

## 2024-12-04 RX ADMIN — METOPROLOL TARTRATE 25 MILLIGRAM(S): 100 TABLET, FILM COATED ORAL at 14:31

## 2024-12-04 RX ADMIN — Medication 15 UNIT(S): at 16:22

## 2024-12-04 RX ADMIN — Medication 6: at 11:37

## 2024-12-04 RX ADMIN — Medication 12 UNIT(S): at 12:34

## 2024-12-04 RX ADMIN — Medication 5 UNIT(S): at 11:37

## 2024-12-04 RX ADMIN — Medication 5: at 07:27

## 2024-12-04 RX ADMIN — SERTRALINE HYDROCHLORIDE 75 MILLIGRAM(S): 100 TABLET, FILM COATED ORAL at 08:26

## 2024-12-04 RX ADMIN — METOPROLOL TARTRATE 25 MILLIGRAM(S): 100 TABLET, FILM COATED ORAL at 20:23

## 2024-12-04 RX ADMIN — Medication 5 UNIT(S): at 07:28

## 2024-12-04 RX ADMIN — Medication 6: at 16:22

## 2024-12-04 RX ADMIN — FAMOTIDINE 40 MILLIGRAM(S): 20 TABLET, FILM COATED ORAL at 20:27

## 2024-12-04 RX ADMIN — PANTOPRAZOLE SODIUM 40 MILLIGRAM(S): 40 TABLET, DELAYED RELEASE ORAL at 08:26

## 2024-12-04 NOTE — UM REPORT PROGRESS NOTE - NSUMRMPROVIDER_GEN_A_CORE FT
[facility]  ID# [id number]  [phone]     12/4- Auth requested via [employer]. Approved 5 days from 12/3-12/7. Auth# [id number] [facility]  ID# [id number]  [phone]     12/4- Auth requested via Availity. Approved 5 days from 12/3-12/7. Auth# [id number]    12/6- Received a vm from [facility] approving 30 days from 12/3-1/1. [facility]  ID# [id number]  [phone]     12/4- Auth requested via Availity. Approved 5 days from 12/3-12/7. Auth# [id number]    12/6- Received a vm from [facility] approving 30 days from 12/3-1/1.  12/10- Norma - emailed discharge clinicals to [facility]

## 2024-12-04 NOTE — BH INPATIENT PSYCHIATRY ASSESSMENT NOTE - NSTXDEPRESDATEEST_PSY_ALL_CORE
Pulmonary Note  Name: Neisha Guardado     : 1946  Hospital: [facility]    Date: 2024 9:19 AM Date of Admission: 2024       Impression:   Plan:   -- Acute exacerbation bronchiectasis, CT multifocal airspace disease  -- Hx MDR organisms  -- Hx asthma, BATSHEVA  -- DM, HTN, GERD -- O2 if needed, currently on RA  -- Continue azithro, ceftazidime. Can stop vanc.  -- Mobilize as able  -- Flutter  -- Sputum culture if able  -- Consider bronch is she fails to improve as expected   -- Prednisone taper  -- Brovana/Pulmicort/Duonebs  -- Hypertonic saline nebs per her usual  -- Chest PT  -- DVT ppx  --  Recommended she resume her percussive vest at home         CC:   Chief Complaint   Patient presents with    Cough      Interval History:    Afebrile  BP stable  Sats 95% on RA  Creat 1.05 - stable  WBC 10.5  RVP neg  MRSA cx neg    ROS: Feeling a little better today.  Cough less productive at this time.  Denies fever or chills.  Tells me she had right sided chest discomfort prior to admission.    Initial HPI:   -  Known patient of Dr. Parr, bronchiectasis on HFCWO device at home - stopped using one month ago as she didn't think it was doing anything.  Presented with 3 days of dyspnea/cough/congestion/sputum, no fevers or chest pain, refractory to 2 days levaquin that had been given to her by the ED.  A couple days ago had some blood in the sputum but none so farm today.  She is on plavix.  She does report feeling a bit better today.  Denies s/sx aspiration.    Exam Findings:  General: Awake, alert, no acute distress   HEENT: NC/AT, EOMI, moist mucous membranes   Neck: Supple, no meningismus, no masses or swelling   HEART: RRR, no murmurs/rubs/gallops   Lungs: No deformities, normal chest rise and fall, no increased work of breathing   Lung auscultation: Good air movement bilaterally, occasional ronchi, basilar rales  Abdomen: Soft/NT  Extremities: No cyanosis/clubbing, normal peripheral  Take 1 tablet by mouth daily  Patient not taking: Reported on 2024    Provider, MD Valentine   omeprazole (PRILOSEC OTC) 20 MG tablet Take 1 tablet by mouth daily    Automatic Reconciliation, Ar      Allergies   Allergen Reactions    Aspirin Cough, Other (See Comments) and Shortness Of Breath    Beta Adrenergic Blockers Other (See Comments)     Was told by her PCP to report that she has a mutation- she does not recall a reaction    Codeine Rash      Social History     Tobacco Use    Smoking status: Former     Current packs/day: 0.00     Types: Cigarettes     Quit date: 1964     Years since quittin.6    Smokeless tobacco: Never    Tobacco comments:     Quit smoking: smoked x 1 year   Substance Use Topics    Alcohol use: No      Family History   Problem Relation Age of Onset    Hypertension Father     Heart Disease Father 53        MI    Heart Disease Mother         afib    Cancer Mother         uterine        Vitals:   BP (!) 146/71   Pulse 95   Temp 97.5 °F (36.4 °C) (Oral)   Resp 17   Ht 1.626 m (5' 4\")   Wt 71.7 kg (158 lb)   LMP  (LMP Unknown)   SpO2 95%   BMI 27.12 kg/m²      Current Medications / Inpatient Orders:   Active Orders   Microbiology    Culture, Respiratory     Frequency: 1 Time     Number of Occurrences: 1 Occurrences   Lab    Basic Metabolic Panel     Frequency: Daily     Number of Occurrences: 3 Days     Order Comments: Per P&T approved vancomycin dosing/monitoring protocol.       Diet    ADULT DIET; Regular     Frequency: Effective Now     Number of Occurrences: Until Specified   Nursing    HYPOGLYCEMIA TREATMENT: blood glucose LESS THAN 70 mg/dL and patient ALERT and TOLERATING PO     Frequency: PRN     Number of Occurrences: Until Specified     Order Comments: Give 4 ounces juice or regular soda or 4 glucose chew tablets. Repeat blood glucose in 15 minutes. If blood glucose is LESS THAN 70 mg/dL, repeat treatment and recheck blood glucose in 15 minutes x 2. If blood  03-Dec-2024

## 2024-12-04 NOTE — BH INPATIENT PSYCHIATRY ASSESSMENT NOTE - NSBHCHARTREVIEWVS_PSY_A_CORE FT
Vital Signs Last 24 Hrs  T(C): 35.9 (12-04-24 @ 08:44), Max: 36.8 (12-03-24 @ 11:06)  T(F): 96.6 (12-04-24 @ 08:44), Max: 98.2 (12-03-24 @ 11:06)  HR: 93 (12-04-24 @ 08:44) (81 - 93)  BP: 129/67 (12-04-24 @ 08:44) (124/74 - 147/78)  BP(mean): --  RR: 16 (12-03-24 @ 16:31) (16 - 16)  SpO2: 98% (12-03-24 @ 16:31) (98% - 98%)     Vital Signs Last 24 Hrs  T(C): 35.9 (12-04-24 @ 08:44), Max: 36.7 (12-03-24 @ 16:31)  T(F): 96.6 (12-04-24 @ 08:44), Max: 98 (12-03-24 @ 16:31)  HR: 93 (12-04-24 @ 08:44) (81 - 93)  BP: 129/67 (12-04-24 @ 08:44) (124/84 - 147/78)  BP(mean): --  RR: 16 (12-03-24 @ 16:31) (16 - 16)  SpO2: 98% (12-03-24 @ 16:31) (98% - 98%)     Vital Signs Last 24 Hrs  T(C): 35.9 (12-04-24 @ 08:44), Max: 36.7 (12-03-24 @ 16:31)  T(F): 96.6 (12-04-24 @ 08:44), Max: 98 (12-03-24 @ 16:31)  HR: 107 (12-04-24 @ 14:33) (81 - 107)  BP: 137/87 (12-04-24 @ 14:33) (124/84 - 147/78)  BP(mean): --  RR: 16 (12-03-24 @ 16:31) (16 - 16)  SpO2: 98% (12-03-24 @ 16:31) (98% - 98%)     Vital Signs Last 24 Hrs  T(C): 36.4 (12-05-24 @ 07:47), Max: 36.9 (12-04-24 @ 16:05)  T(F): 97.5 (12-05-24 @ 07:47), Max: 98.5 (12-04-24 @ 16:05)  HR: 92 (12-05-24 @ 07:47) (80 - 107)  BP: 122/75 (12-05-24 @ 07:47) (112/69 - 137/87)  BP(mean): --  RR: --  SpO2: --

## 2024-12-04 NOTE — BH INPATIENT PSYCHIATRY ASSESSMENT NOTE - NSBHCHARTREVIEWINVESTIGATE_PSY_A_CORE FT
< from: 12 Lead ECG (12.03.24 @ 11:37) >      Ventricular Rate 76 BPM    Atrial Rate 76 BPM    P-R Interval 124 ms    QRS Duration 84 ms    Q-T Interval 420 ms    QTC Calculation(Bazett) 472 ms    P Axis 31 degrees    R Axis 85 degrees    T Axis 34 degrees    Diagnosis Line Normal sinus rhythm  Normal ECG    < end of copied text >

## 2024-12-04 NOTE — BH SOCIAL WORK INITIAL PSYCHOSOCIAL EVALUATION - OTHER PAST PSYCHIATRIC HISTORY (INCLUDE DETAILS REGARDING ONSET, COURSE OF ILLNESS, INPATIENT/OUTPATIENT TREATMENT)
psychiatric history of Borderline Personality traits, MDD, numerous prior psychiatric hospitalizations (most recent April 2024), previous suicide attempts by ingesting substances like acetone or hydrogen peroxide, currently followed by psychiatrist Dr. Curiel,

## 2024-12-04 NOTE — PHYSICAL THERAPY INITIAL EVALUATION ADULT - GENERAL OBSERVATIONS, REHAB EVAL
13:30 - 13:50 Chart reviewed. Order received.  Patient is ok to be  seen for PT,confirmed with RN. pt encountered  Supine in  bed, complains of low back pain, and agrees to participate in session ,NAD.

## 2024-12-04 NOTE — BH INPATIENT PSYCHIATRY ASSESSMENT NOTE - NSBHMSETHTPROC_PSY_A_CORE
CAD S/P percutaneous coronary angioplasty    Cerebrovascular accident (CVA)  November 2020, June 2020, May 2020  HTN (hypertension)    Polio    Rheumatic fever    TIA (transient ischemic attack)     Linear

## 2024-12-04 NOTE — BH INPATIENT PSYCHIATRY ASSESSMENT NOTE - NSBHATTESTAPPBILLTIME_PSY_A_CORE
I attest my time as JABARI is greater than 50% of the total combined time spent on qualifying patient care activities. I have reviewed and verified the documentation.

## 2024-12-04 NOTE — BH INPATIENT PSYCHIATRY ASSESSMENT NOTE - CURRENT MEDICATION
MEDICATIONS  (STANDING):  amLODIPine   Tablet 5 milliGRAM(s) Oral daily  dextrose 5%. 1000 milliLiter(s) (100 mL/Hr) IV Continuous <Continuous>  dextrose 5%. 1000 milliLiter(s) (50 mL/Hr) IV Continuous <Continuous>  dextrose 50% Injectable 25 Gram(s) IV Push once  dextrose 50% Injectable 12.5 Gram(s) IV Push once  dextrose 50% Injectable 25 Gram(s) IV Push once  famotidine    Tablet 40 milliGRAM(s) Oral at bedtime  glucagon  Injectable 1 milliGRAM(s) IntraMuscular once  insulin glargine Injectable (LANTUS) 15 Unit(s) SubCutaneous at bedtime  insulin lispro (ADMELOG) corrective regimen sliding scale   SubCutaneous three times a day before meals  insulin lispro Injectable (ADMELOG) 5 Unit(s) SubCutaneous three times a day before meals  metoprolol tartrate 25 milliGRAM(s) Oral three times a day  pantoprazole    Tablet 40 milliGRAM(s) Oral before breakfast  sertraline 75 milliGRAM(s) Oral daily    MEDICATIONS  (PRN):  acetaminophen     Tablet .. 650 milliGRAM(s) Oral every 6 hours PRN Mild Pain (1 - 3), Moderate Pain (4 - 6)  clonazePAM  Tablet 0.5 milliGRAM(s) Oral two times a day PRN Anxiety  dextrose Oral Gel 15 Gram(s) Oral once PRN Blood Glucose LESS THAN 70 milliGRAM(s)/deciliter  ibuprofen  Tablet. 400 milliGRAM(s) Oral every 6 hours PRN Mild Pain (1 - 3), Moderate Pain (4 - 6)   MEDICATIONS  (STANDING):  amLODIPine   Tablet 5 milliGRAM(s) Oral daily  dextrose 5%. 1000 milliLiter(s) (100 mL/Hr) IV Continuous <Continuous>  dextrose 5%. 1000 milliLiter(s) (50 mL/Hr) IV Continuous <Continuous>  dextrose 50% Injectable 25 Gram(s) IV Push once  dextrose 50% Injectable 12.5 Gram(s) IV Push once  dextrose 50% Injectable 25 Gram(s) IV Push once  famotidine    Tablet 40 milliGRAM(s) Oral at bedtime  glucagon  Injectable 1 milliGRAM(s) IntraMuscular once  insulin glargine Injectable (LANTUS) 45 Unit(s) SubCutaneous at bedtime  insulin lispro (ADMELOG) corrective regimen sliding scale   SubCutaneous three times a day before meals  insulin lispro Injectable (ADMELOG) 15 Unit(s) SubCutaneous three times a day with meals  metoprolol tartrate 25 milliGRAM(s) Oral three times a day  pantoprazole    Tablet 40 milliGRAM(s) Oral before breakfast  sertraline 75 milliGRAM(s) Oral daily    MEDICATIONS  (PRN):  acetaminophen     Tablet .. 650 milliGRAM(s) Oral every 6 hours PRN Mild Pain (1 - 3), Moderate Pain (4 - 6)  clonazePAM  Tablet 0.5 milliGRAM(s) Oral two times a day PRN Anxiety  dextrose Oral Gel 15 Gram(s) Oral once PRN Blood Glucose LESS THAN 70 milliGRAM(s)/deciliter  ibuprofen  Tablet. 400 milliGRAM(s) Oral every 6 hours PRN Mild Pain (1 - 3), Moderate Pain (4 - 6)   MEDICATIONS  (STANDING):  amLODIPine   Tablet 5 milliGRAM(s) Oral daily  dextrose 5%. 1000 milliLiter(s) (100 mL/Hr) IV Continuous <Continuous>  dextrose 5%. 1000 milliLiter(s) (50 mL/Hr) IV Continuous <Continuous>  dextrose 50% Injectable 25 Gram(s) IV Push once  dextrose 50% Injectable 12.5 Gram(s) IV Push once  dextrose 50% Injectable 25 Gram(s) IV Push once  famotidine    Tablet 40 milliGRAM(s) Oral at bedtime  glucagon  Injectable 1 milliGRAM(s) IntraMuscular once  insulin glargine Injectable (LANTUS) 45 Unit(s) SubCutaneous at bedtime  insulin lispro (ADMELOG) corrective regimen sliding scale   SubCutaneous three times a day before meals  insulin lispro Injectable (ADMELOG) 15 Unit(s) SubCutaneous three times a day with meals  metoprolol tartrate 25 milliGRAM(s) Oral three times a day  pantoprazole    Tablet 40 milliGRAM(s) Oral before breakfast  sertraline 75 milliGRAM(s) Oral daily    MEDICATIONS  (PRN):  acetaminophen     Tablet .. 650 milliGRAM(s) Oral every 6 hours PRN Mild Pain (1 - 3), Moderate Pain (4 - 6)  dextrose Oral Gel 15 Gram(s) Oral once PRN Blood Glucose LESS THAN 70 milliGRAM(s)/deciliter  hydrOXYzine hydrochloride 25 milliGRAM(s) Oral every 6 hours PRN anxiety/insomnia  ibuprofen  Tablet. 400 milliGRAM(s) Oral every 6 hours PRN Mild Pain (1 - 3), Moderate Pain (4 - 6)   MEDICATIONS  (STANDING):  amLODIPine   Tablet 5 milliGRAM(s) Oral daily  dextrose 5%. 1000 milliLiter(s) (100 mL/Hr) IV Continuous <Continuous>  dextrose 5%. 1000 milliLiter(s) (50 mL/Hr) IV Continuous <Continuous>  dextrose 50% Injectable 25 Gram(s) IV Push once  dextrose 50% Injectable 12.5 Gram(s) IV Push once  dextrose 50% Injectable 25 Gram(s) IV Push once  famotidine    Tablet 40 milliGRAM(s) Oral at bedtime  glucagon  Injectable 1 milliGRAM(s) IntraMuscular once  insulin glargine Injectable (LANTUS) 50 Unit(s) SubCutaneous at bedtime  insulin lispro (ADMELOG) corrective regimen sliding scale   SubCutaneous three times a day before meals  insulin lispro Injectable (ADMELOG) 15 Unit(s) SubCutaneous three times a day with meals  metoprolol tartrate 25 milliGRAM(s) Oral three times a day  pantoprazole    Tablet 40 milliGRAM(s) Oral before breakfast  sertraline 75 milliGRAM(s) Oral daily    MEDICATIONS  (PRN):  acetaminophen     Tablet .. 650 milliGRAM(s) Oral every 6 hours PRN Mild Pain (1 - 3), Moderate Pain (4 - 6)  dextrose Oral Gel 15 Gram(s) Oral once PRN Blood Glucose LESS THAN 70 milliGRAM(s)/deciliter  haloperidol     Tablet 5 milliGRAM(s) Oral every 6 hours PRN agitation  hydrOXYzine hydrochloride 25 milliGRAM(s) Oral every 6 hours PRN anxiety/insomnia  ibuprofen  Tablet. 400 milliGRAM(s) Oral every 6 hours PRN Mild Pain (1 - 3), Moderate Pain (4 - 6)  lidocaine   4% Patch 1 Patch Transdermal daily PRN pain
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
## 2024-12-04 NOTE — BH INPATIENT PSYCHIATRY ASSESSMENT NOTE - ATTENDING COMMENTS
Interviewed patient with the NP Feli. Patient observed resting in bed, smiling, calm and cooperative. Reports feeling depressed, denies SI. Agree with the assessment and plan.

## 2024-12-04 NOTE — ED PROVIDER NOTE - MDM ORDERS SUBMITTED SELECTION
Patient tolerated injection well. Patient advised to wait 20 minutes in the office following the injection. No signs/symptoms of reaction noted after 20 minutes.  Administrations This Visit       cyanocobalamin injection 1,000 mcg       Admin Date  12/04/2024  16:20 Action  Given Dose  1,000 mcg Route  IntraMUSCular Site  Deltoid Left Documented By  Jeanette Nolasco RN    NDC: 52898-924-46    : [employer] [facility]    Patient Supplied?: No                    Labs/Imaging Studies/Medications

## 2024-12-04 NOTE — BH INPATIENT PSYCHIATRY ASSESSMENT NOTE - NSSUICPROTFACT_PSY_ALL_CORE
Responsibility to children, family, or others/Fear of death or the actual act of killing self/Cultural, spiritual and/or moral attitudes against suicide

## 2024-12-04 NOTE — CONSULT NOTE ADULT - ASSESSMENT
40yo woman pmhx DM1 (insulin pump), depression, anxiety, asthma, GERD, HTN, obesity;     # DM  - last admission was well controlled on 50 lantus qHS; 15 lispro pre prandial; +2 (moderate) ISS; would resume this admission and titrate as needed  - give stat lispro sq now for FS > 400  - cc diet  - f/u endo OP on d/c  - would monitor closely, has pump at home, prone to hyperglycemia    # HCM  - c/w home medications  - keep outpatient appointments, specifically hepatology, endo, GI;    Rest of care per primary team    Recall medicine as needed

## 2024-12-04 NOTE — PHYSICAL THERAPY INITIAL EVALUATION ADULT - PERTINENT HX OF CURRENT PROBLEM, REHAB EVAL
39 year old  female, domiciled at [facility],  past medical history of T1D, diabetic neuropathy, HTN, chronic back pain, migraine headaches, pseudoseizures, with past psychiatric history of depression/anxiety and cluster B personality disorder, 8 previous psychiatric admissions, multiple previous suicide attempts (remote hx of once by taking a capful of acetone, another by drinking hydrogen peroxide, another attempt by taking half a bottle of ibuprofen; most recent SA about a year ago by ingesting Pine-Sol, no known medical consequences), no legal or violence hx, denies substance use, comes in for worsening depression. presents ED with complaints sadness, frequent crying, depression.  Patient states that she recently was increased in her dose of Zoloft to 50 mg at the end of October.  Patient states he has not felt a change in mood since increased dose.  She denies any suicidal ideations or plan at this time.  No homicidal ideations.  No drug or substance use.  No alcohol use.  No auditory or visual hallucinations.

## 2024-12-04 NOTE — PHYSICAL THERAPY INITIAL EVALUATION ADULT - NSACTIVITYREC_GEN_A_PT
Patient presents with independent bed mobility, and gait and transfers with supervision with rolling walker. Am Pac Mobility Score :22. Skilled PT is not recommended at this time, reconsult at a later time if necessary.

## 2024-12-04 NOTE — CONSULT NOTE ADULT - SUBJECTIVE AND OBJECTIVE BOX
CHIEF COMPLAINT:    Patient is a 39y old  Female admitted to P    OVERNIGHT EVENTS:    - no overnight events  - pt examined at bedside, [facility] - has been recovering from recent viral gastritis and PNA;   - tolerating PO, having BMs, making urine without urinary sxs    FOLLOW UP:    - glucose control  - f/u hepatology, GI, endo, and all other home doctors after discharge    HPI:    39 year old female with PMHx of DM1 (insulin pump), depression, anxiety, asthma, GERD, HTN, obesity being evaluated by  for MDD, currently admitted to [facility];     ALLERGIES:    Clindamycin Phosphate (Unknown)  amoxicillin (Hives)  clindamycin (Hives; Nephrotoxicity)  penicillins (Hives)  Fluad 0.5 ML ODETTE (Unknown)  Ceclor (Rash)    Intolerances  Cipro (Other)  Influenza Virus Vaccine, H5N1, Inactivated (Other)  gabapentin (Other)      PMHx:    Neuropathy  right lower extremity, vaginal  Type 1 diabetes  Degenerative disc disease, thoracic  Urinary tract infection, recurrent  Gastroesophageal reflux disease, esophagitis presence not specified  Intractable headache, unspecified chronicity pattern, unspecified headache type  Major depressive disorder with single episode, in full remission  previously treated with zyprexa, celexa and lexapro  Tremor of right hand  Tachycardia  Spinal stenosis  No significant past surgical history    SOCIAL Hx:    non smoker non drinker no recreational drug use  lives in assisted living; has walker;  no pets no recent travel    MEDICATIONS:  STANDING MEDICATIONS  amLODIPine   Tablet 5 milliGRAM(s) Oral daily  dextrose 5%. 1000 milliLiter(s) IV Continuous <Continuous>  dextrose 5%. 1000 milliLiter(s) IV Continuous <Continuous>  dextrose 50% Injectable 25 Gram(s) IV Push once  dextrose 50% Injectable 12.5 Gram(s) IV Push once  dextrose 50% Injectable 25 Gram(s) IV Push once  famotidine    Tablet 40 milliGRAM(s) Oral at bedtime  glucagon  Injectable 1 milliGRAM(s) IntraMuscular once  insulin lispro (ADMELOG) corrective regimen sliding scale   SubCutaneous three times a day before meals  insulin lispro Injectable (ADMELOG) 5 Unit(s) SubCutaneous three times a day before meals  insulin lispro Injectable (ADMELOG). 12 Unit(s) SubCutaneous once  metoprolol tartrate 25 milliGRAM(s) Oral three times a day  pantoprazole    Tablet 40 milliGRAM(s) Oral before breakfast  sertraline 75 milliGRAM(s) Oral daily    PRN MEDICATIONS  acetaminophen     Tablet .. 650 milliGRAM(s) Oral every 6 hours PRN  clonazePAM  Tablet 0.5 milliGRAM(s) Oral two times a day PRN  dextrose Oral Gel 15 Gram(s) Oral once PRN  ibuprofen  Tablet. 400 milliGRAM(s) Oral every 6 hours PRN      LABS:                        10.2   10.99 )-----------( 516      ( 03 Dec 2024 12:01 )             35.0     12-03    134[L]  |  100  |  8[L]  ----------------------------<  308[H]  3.9   |  23  |  0.6[L]    Ca    9.0      03 Dec 2024 12:01    TPro  7.5  /  Alb  4.1  /  TBili  <0.2  /  DBili  x   /  AST  20  /  ALT  38  /  AlkPhos  159[H]  12-03      Urinalysis Basic - ( 03 Dec 2024 12:01 )    Color: x / Appearance: x / SG: x / pH: x  Gluc: 308 mg/dL / Ketone: x  / Bili: x / Urobili: x   Blood: x / Protein: x / Nitrite: x   Leuk Esterase: x / RBC: x / WBC x   Sq Epi: x / Non Sq Epi: x / Bacteria: x      VITALS:   T(F): 96.6  HR: 93  BP: 129/67  RR: 16  SpO2: 98%    PHYSICAL EXAM:    Gen: NAD, resting in bed; obese   HEENT: Normocephalic, atraumatic; facial hair  Neck: supple, no lymphadenopathy  CV: Regular rate & regular rhythm  Lungs: decreased BS at bases, no fremitus; comfortable on room air  Abdomen: Soft, BS present; prominent   Ext: Warm, well perfused; +1 pitting BL  Neuro: moves all extremities against resistance, no droop, awake  Skin: no rash, no erythema

## 2024-12-04 NOTE — PHYSICAL THERAPY INITIAL EVALUATION ADULT - ADDITIONAL COMMENTS
pt is 40 y/o F , lives with mother in assisted living facility , information obtain from the patient , pt was independent using Rollator with bed mobility , transfer , ambulation ,stair negotiation and basic ADLS.

## 2024-12-04 NOTE — BH INPATIENT PSYCHIATRY ASSESSMENT NOTE - NSBHMEDSOTHERFT_PSY_A_CORE
Paperwork from [facility]-  Azelastine hcl 0.01% 2 sprays each nostril daily  famotidine 40 mg qhs  lopressor 25 mg tid  norvasc 5 mg daily  omeprazole 40 mg daily  sertraline 50 mg daily  ibuprofen 600 mg q8h prn  methocarbamol 1000 mg q8h  naproxen 500 mg q12h prn  nurtec 75 mg daily prn for migraine  zofran 4 mg q8h prn for nausea

## 2024-12-04 NOTE — BH INPATIENT PSYCHIATRY ASSESSMENT NOTE - NSBHCHARTREVIEWLAB_PSY_A_CORE FT
Pregnancy Test Routine, Serum (12.03.24 @ 12:01) Serum Pregnancy: Negative  Comprehensive Metabolic Panel (12.03.24 @ 12:01)   Sodium: 134 mmol/L  Potassium: 3.9 mmol/L  Chloride: 100 mmol/L  Carbon Dioxide: 23 mmol/L  Anion Gap: 11 mmol/L  Blood Urea Nitrogen: 8 mg/dL  Creatinine: 0.6 mg/dL  Glucose: 308 mg/dL  Calcium: 9.0 mg/dL  Protein Total: 7.5 g/dL  Albumin: 4.1 g/dL  Bilirubin Total: <0.2 mg/dL  Alkaline Phosphatase: 159 U/L  Aspartate Aminotransferase (AST/SGOT): 20 U/L  Alanine Aminotransferase (ALT/SGPT): 38 U/L  eGFR: 117:  Complete Blood Count + Automated Diff (12.03.24 @ 12:01)   WBC Count: 10.99 K/uL  RBC Count: 4.55 M/uL  Hemoglobin: 10.2 g/dL  Hematocrit: 35.0 %  Mean Cell Volume: 76.9 fL  Mean Cell Hemoglobin: 22.4 pg  Mean Cell Hemoglobin Conc: 29.1 g/dL  Red Cell Distrib Width: 16.7 %  Platelet Count - Automated: 516 K/uL  MPV: 9.8 fL  Auto Neutrophil #: 7.91 K/uL  Auto Lymphocyte #: 2.12 K/uL  Auto Monocyte #: 0.73 K/uL  Auto Eosinophil #: 0.10 K/uL  Auto Basophil #: 0.07 K/uL  Auto Neutrophil %: 72.1%  Auto Lymphocyte %: 19.3 %  Auto Monocyte %: 6.6 %  Auto Eosinophil %: 0.9 %  Auto Basophil %: 0.6 %  Auto Immature Granulocyte %: 0.5 %  Nucleated RBC: 0 /100 WBCs

## 2024-12-04 NOTE — PHYSICAL THERAPY INITIAL EVALUATION ADULT - BALANCE DISTURBANCE, SYSTEM IMPAIRMENT CONTRIBUTE, REHAB EVAL
Clinic Care Coordination Contact  [facility]    Clinical Data:  Outreach- SW following up at this time to check-in with family. SW aware per review of chart notes that rehab therapies have been cancelled due to the coronavirus concerns. Dad indicated in his call with OT last week [place] so declined rescheduling future rehab therapies at that time. Pt's ENT and Audiology visits were also cancelled for next week.     Outreach attempted x 1. SW unable to reach Dad and unable to LM as the VM box is full.    Plan: SW will try again to reach Dad in 1-2 weeks time.     SUSHMA Nobles, [facility]  , Care Coordination   [facility]   [phone]  [facility] [email or web address]     
musculoskeletal

## 2024-12-05 DIAGNOSIS — F60.9 PERSONALITY DISORDER, UNSPECIFIED: ICD-10-CM

## 2024-12-05 LAB
GLUCOSE BLDC GLUCOMTR-MCNC: 186 MG/DL — HIGH (ref 70–99)
GLUCOSE BLDC GLUCOMTR-MCNC: 189 MG/DL — HIGH (ref 70–99)
GLUCOSE BLDC GLUCOMTR-MCNC: 215 MG/DL — HIGH (ref 70–99)
GLUCOSE BLDC GLUCOMTR-MCNC: 337 MG/DL — HIGH (ref 70–99)

## 2024-12-05 PROCEDURE — 99232 SBSQ HOSP IP/OBS MODERATE 35: CPT

## 2024-12-05 RX ORDER — MAGNESIUM, ALUMINUM HYDROXIDE 200-225/5
30 SUSPENSION, ORAL (FINAL DOSE FORM) ORAL EVERY 6 HOURS
Refills: 0 | Status: DISCONTINUED | OUTPATIENT
Start: 2024-12-05 | End: 2024-12-10

## 2024-12-05 RX ORDER — INSULIN GLARGINE 100 [IU]/ML
50 INJECTION, SOLUTION SUBCUTANEOUS AT BEDTIME
Refills: 0 | Status: DISCONTINUED | OUTPATIENT
Start: 2024-12-05 | End: 2024-12-10

## 2024-12-05 RX ORDER — LIDOCAINE 40 MG/G
1 CREAM TOPICAL DAILY
Refills: 0 | Status: DISCONTINUED | OUTPATIENT
Start: 2024-12-05 | End: 2024-12-06

## 2024-12-05 RX ADMIN — ACETAMINOPHEN 500MG 650 MILLIGRAM(S): 500 TABLET, COATED ORAL at 12:05

## 2024-12-05 RX ADMIN — AMLODIPINE BESYLATE 5 MILLIGRAM(S): 10 TABLET ORAL at 08:32

## 2024-12-05 RX ADMIN — Medication 30 MILLILITER(S): at 21:32

## 2024-12-05 RX ADMIN — FAMOTIDINE 40 MILLIGRAM(S): 20 TABLET, FILM COATED ORAL at 20:17

## 2024-12-05 RX ADMIN — Medication 2: at 16:41

## 2024-12-05 RX ADMIN — Medication 15 UNIT(S): at 16:42

## 2024-12-05 RX ADMIN — METOPROLOL TARTRATE 25 MILLIGRAM(S): 100 TABLET, FILM COATED ORAL at 12:29

## 2024-12-05 RX ADMIN — Medication 8: at 07:43

## 2024-12-05 RX ADMIN — Medication 15 UNIT(S): at 11:31

## 2024-12-05 RX ADMIN — PANTOPRAZOLE SODIUM 40 MILLIGRAM(S): 40 TABLET, DELAYED RELEASE ORAL at 08:33

## 2024-12-05 RX ADMIN — SERTRALINE HYDROCHLORIDE 75 MILLIGRAM(S): 100 TABLET, FILM COATED ORAL at 08:33

## 2024-12-05 RX ADMIN — INSULIN GLARGINE 50 UNIT(S): 100 INJECTION, SOLUTION SUBCUTANEOUS at 20:17

## 2024-12-05 RX ADMIN — Medication 15 UNIT(S): at 07:43

## 2024-12-05 RX ADMIN — METOPROLOL TARTRATE 25 MILLIGRAM(S): 100 TABLET, FILM COATED ORAL at 08:32

## 2024-12-05 RX ADMIN — Medication 4: at 11:30

## 2024-12-05 RX ADMIN — ACETAMINOPHEN 500MG 650 MILLIGRAM(S): 500 TABLET, COATED ORAL at 11:30

## 2024-12-05 RX ADMIN — METOPROLOL TARTRATE 25 MILLIGRAM(S): 100 TABLET, FILM COATED ORAL at 20:17

## 2024-12-05 NOTE — BH INPATIENT PSYCHIATRY PROGRESS NOTE - NSBHFUPINTERVALHXFT_PSY_A_CORE
Pt seen and evaluated, chart reviewed. As per nursing reports, no acute events overnight. On evaluation, pt presents in bed, initially reports stomach pain then clarifies she is with back pains that may be from the bed. She reports not feeling well d/t pain, requesting for treatment team to come back later. Later in day, pt presents  Pt seen and evaluated, chart reviewed. As per nursing reports, no acute events overnight. On evaluation, pt presents in bed, initially reports stomach pain then clarifies she is with back pains that may be from the bed. She reports not feeling well d/t pain, requesting for treatment team to come back later. Later in day, pt presents in hallway on talking with a smile on the phone, agrees to interview privately in room. She remains preoccupied with somatic complaints, now reports lower abdominal pain which she attributes to upcoming period. Otherwise she reports she continues to feel sad regarding upcoming death anniversaries, states she found group therapy beneficial yesterday. She reports fair sleep and appetite. She denies AVH, paranoia. She denies SI/HI. She denies negative side effects of medications. Pt has been visible on unit and in groups/DBT, not a behavioral concern.

## 2024-12-05 NOTE — BH INPATIENT PSYCHIATRY PROGRESS NOTE - CURRENT MEDICATION
MEDICATIONS  (STANDING):  amLODIPine   Tablet 5 milliGRAM(s) Oral daily  dextrose 5%. 1000 milliLiter(s) (100 mL/Hr) IV Continuous <Continuous>  dextrose 5%. 1000 milliLiter(s) (50 mL/Hr) IV Continuous <Continuous>  dextrose 50% Injectable 25 Gram(s) IV Push once  dextrose 50% Injectable 12.5 Gram(s) IV Push once  dextrose 50% Injectable 25 Gram(s) IV Push once  famotidine    Tablet 40 milliGRAM(s) Oral at bedtime  glucagon  Injectable 1 milliGRAM(s) IntraMuscular once  insulin glargine Injectable (LANTUS) 45 Unit(s) SubCutaneous at bedtime  insulin lispro (ADMELOG) corrective regimen sliding scale   SubCutaneous three times a day before meals  insulin lispro Injectable (ADMELOG) 15 Unit(s) SubCutaneous three times a day with meals  metoprolol tartrate 25 milliGRAM(s) Oral three times a day  pantoprazole    Tablet 40 milliGRAM(s) Oral before breakfast  sertraline 75 milliGRAM(s) Oral daily    MEDICATIONS  (PRN):  acetaminophen     Tablet .. 650 milliGRAM(s) Oral every 6 hours PRN Mild Pain (1 - 3), Moderate Pain (4 - 6)  dextrose Oral Gel 15 Gram(s) Oral once PRN Blood Glucose LESS THAN 70 milliGRAM(s)/deciliter  haloperidol     Tablet 5 milliGRAM(s) Oral every 6 hours PRN agitation  hydrOXYzine hydrochloride 25 milliGRAM(s) Oral every 6 hours PRN anxiety/insomnia  ibuprofen  Tablet. 400 milliGRAM(s) Oral every 6 hours PRN Mild Pain (1 - 3), Moderate Pain (4 - 6)  lidocaine   4% Patch 1 Patch Transdermal daily PRN pain   MEDICATIONS  (STANDING):  amLODIPine   Tablet 5 milliGRAM(s) Oral daily  dextrose 5%. 1000 milliLiter(s) (100 mL/Hr) IV Continuous <Continuous>  dextrose 5%. 1000 milliLiter(s) (50 mL/Hr) IV Continuous <Continuous>  dextrose 50% Injectable 25 Gram(s) IV Push once  dextrose 50% Injectable 12.5 Gram(s) IV Push once  dextrose 50% Injectable 25 Gram(s) IV Push once  famotidine    Tablet 40 milliGRAM(s) Oral at bedtime  glucagon  Injectable 1 milliGRAM(s) IntraMuscular once  insulin glargine Injectable (LANTUS) 50 Unit(s) SubCutaneous at bedtime  insulin lispro (ADMELOG) corrective regimen sliding scale   SubCutaneous three times a day before meals  insulin lispro Injectable (ADMELOG) 15 Unit(s) SubCutaneous three times a day with meals  metoprolol tartrate 25 milliGRAM(s) Oral three times a day  pantoprazole    Tablet 40 milliGRAM(s) Oral before breakfast  sertraline 75 milliGRAM(s) Oral daily    MEDICATIONS  (PRN):  acetaminophen     Tablet .. 650 milliGRAM(s) Oral every 6 hours PRN Mild Pain (1 - 3), Moderate Pain (4 - 6)  dextrose Oral Gel 15 Gram(s) Oral once PRN Blood Glucose LESS THAN 70 milliGRAM(s)/deciliter  haloperidol     Tablet 5 milliGRAM(s) Oral every 6 hours PRN agitation  hydrOXYzine hydrochloride 25 milliGRAM(s) Oral every 6 hours PRN anxiety/insomnia  ibuprofen  Tablet. 400 milliGRAM(s) Oral every 6 hours PRN Mild Pain (1 - 3), Moderate Pain (4 - 6)  lidocaine   4% Patch 1 Patch Transdermal daily PRN pain
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
## 2024-12-05 NOTE — BH INPATIENT PSYCHIATRY PROGRESS NOTE - NSBHCHARTREVIEWVS_PSY_A_CORE FT
Vital Signs Last 24 Hrs  T(C): 36.4 (12-05-24 @ 07:47), Max: 36.9 (12-04-24 @ 16:05)  T(F): 97.5 (12-05-24 @ 07:47), Max: 98.5 (12-04-24 @ 16:05)  HR: 92 (12-05-24 @ 07:47) (80 - 107)  BP: 122/75 (12-05-24 @ 07:47) (112/69 - 137/87)  BP(mean): --  RR: --  SpO2: --

## 2024-12-05 NOTE — BH INPATIENT PSYCHIATRY PROGRESS NOTE - NSBHMETABOLIC_PSY_ALL_CORE_FT
BMI: BMI (kg/m2): 32.6 (12-03-24 @ 11:06)  HbA1c: A1C with Estimated Average Glucose Result: 10.0 % (11-08-24 @ 06:35)    Glucose: POCT Blood Glucose.: 337 mg/dL (12-05-24 @ 07:03)    BP: 122/75 (12-05-24 @ 07:47) (112/69 - 147/78)Vital Signs Last 24 Hrs  T(C): 36.4 (12-05-24 @ 07:47), Max: 36.9 (12-04-24 @ 16:05)  T(F): 97.5 (12-05-24 @ 07:47), Max: 98.5 (12-04-24 @ 16:05)  HR: 92 (12-05-24 @ 07:47) (80 - 107)  BP: 122/75 (12-05-24 @ 07:47) (112/69 - 137/87)  BP(mean): --  RR: --  SpO2: --      Lipid Panel:  BMI: BMI (kg/m2): 32.6 (12-03-24 @ 11:06)  HbA1c: A1C with Estimated Average Glucose Result: 10.0 % (11-08-24 @ 06:35)    Glucose: POCT Blood Glucose.: 215 mg/dL (12-05-24 @ 11:15)    BP: 122/75 (12-05-24 @ 07:47) (112/69 - 147/78)Vital Signs Last 24 Hrs  T(C): 36.4 (12-05-24 @ 07:47), Max: 36.9 (12-04-24 @ 16:05)  T(F): 97.5 (12-05-24 @ 07:47), Max: 98.5 (12-04-24 @ 16:05)  HR: 92 (12-05-24 @ 07:47) (80 - 107)  BP: 122/75 (12-05-24 @ 07:47) (112/69 - 137/87)  BP(mean): --  RR: --  SpO2: --      Lipid Panel:

## 2024-12-05 NOTE — BH INPATIENT PSYCHIATRY PROGRESS NOTE - PRN MEDS
MEDICATIONS  (PRN):  acetaminophen     Tablet .. 650 milliGRAM(s) Oral every 6 hours PRN Mild Pain (1 - 3), Moderate Pain (4 - 6)  dextrose Oral Gel 15 Gram(s) Oral once PRN Blood Glucose LESS THAN 70 milliGRAM(s)/deciliter  haloperidol     Tablet 5 milliGRAM(s) Oral every 6 hours PRN agitation  hydrOXYzine hydrochloride 25 milliGRAM(s) Oral every 6 hours PRN anxiety/insomnia  ibuprofen  Tablet. 400 milliGRAM(s) Oral every 6 hours PRN Mild Pain (1 - 3), Moderate Pain (4 - 6)  lidocaine   4% Patch 1 Patch Transdermal daily PRN pain

## 2024-12-06 LAB
GLUCOSE BLDC GLUCOMTR-MCNC: 196 MG/DL — HIGH (ref 70–99)
GLUCOSE BLDC GLUCOMTR-MCNC: 202 MG/DL — HIGH (ref 70–99)
GLUCOSE BLDC GLUCOMTR-MCNC: 246 MG/DL — HIGH (ref 70–99)
GLUCOSE BLDC GLUCOMTR-MCNC: 264 MG/DL — HIGH (ref 70–99)
GLUCOSE BLDC GLUCOMTR-MCNC: 421 MG/DL — HIGH (ref 70–99)

## 2024-12-06 PROCEDURE — 99232 SBSQ HOSP IP/OBS MODERATE 35: CPT

## 2024-12-06 RX ORDER — METHOCARBAMOL 500 MG/1
1000 TABLET, FILM COATED ORAL EVERY 8 HOURS
Refills: 0 | Status: DISCONTINUED | OUTPATIENT
Start: 2024-12-06 | End: 2024-12-10

## 2024-12-06 RX ORDER — LIDOCAINE 40 MG/G
1 CREAM TOPICAL DAILY
Refills: 0 | Status: DISCONTINUED | OUTPATIENT
Start: 2024-12-06 | End: 2024-12-10

## 2024-12-06 RX ORDER — NAPROXEN SODIUM 275 MG
500 TABLET ORAL EVERY 12 HOURS
Refills: 0 | Status: DISCONTINUED | OUTPATIENT
Start: 2024-12-06 | End: 2024-12-10

## 2024-12-06 RX ADMIN — SERTRALINE HYDROCHLORIDE 75 MILLIGRAM(S): 100 TABLET, FILM COATED ORAL at 08:21

## 2024-12-06 RX ADMIN — LIDOCAINE 1 PATCH: 40 CREAM TOPICAL at 18:16

## 2024-12-06 RX ADMIN — Medication 4: at 16:00

## 2024-12-06 RX ADMIN — Medication 15 UNIT(S): at 16:00

## 2024-12-06 RX ADMIN — AMLODIPINE BESYLATE 5 MILLIGRAM(S): 10 TABLET ORAL at 08:21

## 2024-12-06 RX ADMIN — Medication 30 MILLILITER(S): at 08:21

## 2024-12-06 RX ADMIN — METOPROLOL TARTRATE 25 MILLIGRAM(S): 100 TABLET, FILM COATED ORAL at 20:18

## 2024-12-06 RX ADMIN — METOPROLOL TARTRATE 25 MILLIGRAM(S): 100 TABLET, FILM COATED ORAL at 08:21

## 2024-12-06 RX ADMIN — Medication 30 MILLILITER(S): at 15:51

## 2024-12-06 RX ADMIN — Medication 4: at 11:45

## 2024-12-06 RX ADMIN — Medication 15 UNIT(S): at 07:10

## 2024-12-06 RX ADMIN — FAMOTIDINE 40 MILLIGRAM(S): 20 TABLET, FILM COATED ORAL at 20:18

## 2024-12-06 RX ADMIN — Medication 12: at 07:11

## 2024-12-06 RX ADMIN — METOPROLOL TARTRATE 25 MILLIGRAM(S): 100 TABLET, FILM COATED ORAL at 13:30

## 2024-12-06 RX ADMIN — LIDOCAINE 1 PATCH: 40 CREAM TOPICAL at 11:43

## 2024-12-06 RX ADMIN — PANTOPRAZOLE SODIUM 40 MILLIGRAM(S): 40 TABLET, DELAYED RELEASE ORAL at 06:02

## 2024-12-06 RX ADMIN — INSULIN GLARGINE 50 UNIT(S): 100 INJECTION, SOLUTION SUBCUTANEOUS at 20:18

## 2024-12-06 RX ADMIN — Medication 15 UNIT(S): at 11:45

## 2024-12-06 NOTE — BH INPATIENT PSYCHIATRY PROGRESS NOTE - NSBHCHARTREVIEWVS_PSY_A_CORE FT
Vital Signs Last 24 Hrs  T(C): 36.4 (12-06-24 @ 08:34), Max: 36.4 (12-06-24 @ 08:34)  T(F): 97.6 (12-06-24 @ 08:34), Max: 97.6 (12-06-24 @ 08:34)  HR: 96 (12-06-24 @ 08:34) (80 - 96)  BP: 104/68 (12-06-24 @ 08:34) (104/68 - 120/70)  BP(mean): --  RR: --  SpO2: --
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
## 2024-12-06 NOTE — BH INPATIENT PSYCHIATRY PROGRESS NOTE - PRN MEDS
MEDICATIONS  (PRN):  acetaminophen     Tablet .. 650 milliGRAM(s) Oral every 6 hours PRN Mild Pain (1 - 3), Moderate Pain (4 - 6)  aluminum hydroxide/magnesium hydroxide/simethicone Suspension 30 milliLiter(s) Oral every 6 hours PRN Dyspepsia  dextrose Oral Gel 15 Gram(s) Oral once PRN Blood Glucose LESS THAN 70 milliGRAM(s)/deciliter  haloperidol     Tablet 5 milliGRAM(s) Oral every 6 hours PRN agitation  hydrOXYzine hydrochloride 25 milliGRAM(s) Oral every 6 hours PRN anxiety/insomnia  ibuprofen  Tablet. 400 milliGRAM(s) Oral every 6 hours PRN Mild Pain (1 - 3), Moderate Pain (4 - 6)  lidocaine   4% Patch 1 Patch Transdermal daily PRN pain   MEDICATIONS  (PRN):  aluminum hydroxide/magnesium hydroxide/simethicone Suspension 30 milliLiter(s) Oral every 6 hours PRN Dyspepsia  dextrose Oral Gel 15 Gram(s) Oral once PRN Blood Glucose LESS THAN 70 milliGRAM(s)/deciliter  haloperidol     Tablet 5 milliGRAM(s) Oral every 6 hours PRN agitation  hydrOXYzine hydrochloride 25 milliGRAM(s) Oral every 6 hours PRN anxiety/insomnia  methocarbamol 1000 milliGRAM(s) Oral every 8 hours PRN back pain  naproxen 500 milliGRAM(s) Oral every 12 hours PRN pain

## 2024-12-06 NOTE — BH INPATIENT PSYCHIATRY PROGRESS NOTE - CURRENT MEDICATION
MEDICATIONS  (STANDING):  amLODIPine   Tablet 5 milliGRAM(s) Oral daily  dextrose 5%. 1000 milliLiter(s) (100 mL/Hr) IV Continuous <Continuous>  dextrose 5%. 1000 milliLiter(s) (50 mL/Hr) IV Continuous <Continuous>  dextrose 50% Injectable 25 Gram(s) IV Push once  dextrose 50% Injectable 12.5 Gram(s) IV Push once  dextrose 50% Injectable 25 Gram(s) IV Push once  famotidine    Tablet 40 milliGRAM(s) Oral at bedtime  glucagon  Injectable 1 milliGRAM(s) IntraMuscular once  insulin glargine Injectable (LANTUS) 50 Unit(s) SubCutaneous at bedtime  insulin lispro (ADMELOG) corrective regimen sliding scale   SubCutaneous three times a day before meals  insulin lispro Injectable (ADMELOG) 15 Unit(s) SubCutaneous three times a day with meals  metoprolol tartrate 25 milliGRAM(s) Oral three times a day  pantoprazole    Tablet 40 milliGRAM(s) Oral before breakfast  sertraline 75 milliGRAM(s) Oral daily    MEDICATIONS  (PRN):  acetaminophen     Tablet .. 650 milliGRAM(s) Oral every 6 hours PRN Mild Pain (1 - 3), Moderate Pain (4 - 6)  aluminum hydroxide/magnesium hydroxide/simethicone Suspension 30 milliLiter(s) Oral every 6 hours PRN Dyspepsia  dextrose Oral Gel 15 Gram(s) Oral once PRN Blood Glucose LESS THAN 70 milliGRAM(s)/deciliter  haloperidol     Tablet 5 milliGRAM(s) Oral every 6 hours PRN agitation  hydrOXYzine hydrochloride 25 milliGRAM(s) Oral every 6 hours PRN anxiety/insomnia  ibuprofen  Tablet. 400 milliGRAM(s) Oral every 6 hours PRN Mild Pain (1 - 3), Moderate Pain (4 - 6)  lidocaine   4% Patch 1 Patch Transdermal daily PRN pain   MEDICATIONS  (STANDING):  amLODIPine   Tablet 5 milliGRAM(s) Oral daily  dextrose 5%. 1000 milliLiter(s) (100 mL/Hr) IV Continuous <Continuous>  dextrose 5%. 1000 milliLiter(s) (50 mL/Hr) IV Continuous <Continuous>  dextrose 50% Injectable 25 Gram(s) IV Push once  dextrose 50% Injectable 12.5 Gram(s) IV Push once  dextrose 50% Injectable 25 Gram(s) IV Push once  famotidine    Tablet 40 milliGRAM(s) Oral at bedtime  glucagon  Injectable 1 milliGRAM(s) IntraMuscular once  insulin glargine Injectable (LANTUS) 50 Unit(s) SubCutaneous at bedtime  insulin lispro (ADMELOG) corrective regimen sliding scale   SubCutaneous three times a day before meals  insulin lispro Injectable (ADMELOG) 15 Unit(s) SubCutaneous three times a day with meals  lidocaine   4% Patch 1 Patch Transdermal daily  metoprolol tartrate 25 milliGRAM(s) Oral three times a day  pantoprazole    Tablet 40 milliGRAM(s) Oral before breakfast  sertraline 75 milliGRAM(s) Oral daily    MEDICATIONS  (PRN):  aluminum hydroxide/magnesium hydroxide/simethicone Suspension 30 milliLiter(s) Oral every 6 hours PRN Dyspepsia  dextrose Oral Gel 15 Gram(s) Oral once PRN Blood Glucose LESS THAN 70 milliGRAM(s)/deciliter  haloperidol     Tablet 5 milliGRAM(s) Oral every 6 hours PRN agitation  hydrOXYzine hydrochloride 25 milliGRAM(s) Oral every 6 hours PRN anxiety/insomnia  methocarbamol 1000 milliGRAM(s) Oral every 8 hours PRN back pain  naproxen 500 milliGRAM(s) Oral every 12 hours PRN pain

## 2024-12-06 NOTE — BH DISCHARGE NOTE NURSING/SOCIAL WORK/PSYCH REHAB - NSCDUDCCRISIS_PSY_A_CORE
On license of [facility]  1 [phone] [facility] [phone])  Text "WELL" to [phone]  Website: [email or web address]/.National Suicide Prevention Lifeline 4 [phone] [phone] 958-8837/988 Suicide and Crisis Lifeline

## 2024-12-06 NOTE — BH TREATMENT PLAN - NSCMSPTSTRENGTHS_PSY_ALL_CORE
Compliance to treatment/Expressive of emotions/Future/goal oriented/Highly motivated for treatment/Motivated/Supportive family

## 2024-12-06 NOTE — BH DISCHARGE NOTE NURSING/SOCIAL WORK/PSYCH REHAB - NSDCSUICIDEPREP_PSY_ALL_CORE
Left message with help of  asking for a call back so we can reschedule the hearing aid check appointment on 6/27 as I need to be out of the office that day. Provided call back number.    Izabella Tao, CCC-A  [place] Licensed Audiologist #[id number]     No

## 2024-12-06 NOTE — BH INPATIENT PSYCHIATRY PROGRESS NOTE - NSBHFUPINTERVALHXFT_PSY_A_CORE
Pt seen and evaluated, chart reviewed. As per nursing reports, no acute events overnight. On evaluation, pt presents in bed with somatic complaints, reports back/stomach pains which she attributes to upcoming menstruation/diet. Otherwise she reports feeling "mentally better". She reports improving mood, states she is sleeping and eating well. She denies AVH, paranoia. Denies SI/HI. She reports benefits of group therapy with goal to attend more today. Pt has been visible on unit and in groups/DBT, not a behavioral concern.   Spoke with pt's mother- updated on POC Pt seen and evaluated, chart reviewed. As per nursing reports, no acute events overnight. On evaluation, pt presents in bed with somatic complaints, reports chronic back pain and lower abdominal pain which she attributes to upcoming menstruation/diet. Otherwise she reports feeling "mentally better". She reports improving mood, states she is sleeping and eating well. She denies AVH, paranoia. Denies SI/HI. She reports benefits of group therapy with goal to attend more today. Pt has been visible on unit and in groups/DBT, not a behavioral concern.   Spoke with pt's mother- updated on POC

## 2024-12-06 NOTE — BH TREATMENT PLAN - NSTXPLANTHERAPYSESSIONSFT_PSY_ALL_CORE
12-04-24  Type of therapy: Coping skills, Creative arts therapy, Inspiration and motiviation, Leisure development, Music therapy, Self esteem, Social skills training, Stress management, Symptom management  Type of session: Individual  Level of patient participation: Participated with encouragement  Duration of participation: 15 minutes  Therapy conducted by: Psych rehab  Therapy Summary: Pt is familiar with unit and RT sessions . Pt usally attends groups when mood improves . Pt enjoys music creative arts and is social with peers .    12-04-24  Type of therapy: Addiction education  Type of session: Group  Level of patient participation: Participates  Duration of participation: 45 minutes  Therapy conducted by: Social work  Therapy Summary: Patient attended the [facility] Group with  and peers. The “Stages of Change” worksheet was reviewed. Patients were asked to attempt to recognize which stage of change they currently identify with.  facilitated the discussion and patients provided feedback and support.

## 2024-12-06 NOTE — BH INPATIENT PSYCHIATRY PROGRESS NOTE - NSBHMETABOLIC_PSY_ALL_CORE_FT
BMI: BMI (kg/m2): 32.6 (12-03-24 @ 11:06)  HbA1c: A1C with Estimated Average Glucose Result: 10.0 % (11-08-24 @ 06:35)    Glucose: POCT Blood Glucose.: 264 mg/dL (12-06-24 @ 09:43)    BP: 104/68 (12-06-24 @ 08:34) (104/68 - 147/78)Vital Signs Last 24 Hrs  T(C): 36.4 (12-06-24 @ 08:34), Max: 36.4 (12-06-24 @ 08:34)  T(F): 97.6 (12-06-24 @ 08:34), Max: 97.6 (12-06-24 @ 08:34)  HR: 96 (12-06-24 @ 08:34) (80 - 96)  BP: 104/68 (12-06-24 @ 08:34) (104/68 - 120/70)  BP(mean): --  RR: --  SpO2: --      Lipid Panel:  BMI: BMI (kg/m2): 32.6 (12-03-24 @ 11:06)  HbA1c: A1C with Estimated Average Glucose Result: 10.0 % (11-08-24 @ 06:35)    Glucose: POCT Blood Glucose.: 202 mg/dL (12-06-24 @ 11:38)    BP: 104/68 (12-06-24 @ 08:34) (104/68 - 147/78)Vital Signs Last 24 Hrs  T(C): 36.4 (12-06-24 @ 08:34), Max: 36.4 (12-06-24 @ 08:34)  T(F): 97.6 (12-06-24 @ 08:34), Max: 97.6 (12-06-24 @ 08:34)  HR: 96 (12-06-24 @ 08:34) (80 - 96)  BP: 104/68 (12-06-24 @ 08:34) (104/68 - 120/70)  BP(mean): --  RR: --  SpO2: --      Lipid Panel:

## 2024-12-06 NOTE — BH DISCHARGE NOTE NURSING/SOCIAL WORK/PSYCH REHAB - PATIENT PORTAL LINK FT
You can access the FollowMyHealth Patient Portal offered by [facility] by registering at the following website: [email or web address]. By joining [employer]’s FollowMyHealth portal, you will also be able to view your health information using other applications (apps) compatible with our system.

## 2024-12-07 LAB
GLUCOSE BLDC GLUCOMTR-MCNC: 125 MG/DL — HIGH (ref 70–99)
GLUCOSE BLDC GLUCOMTR-MCNC: 149 MG/DL — HIGH (ref 70–99)
GLUCOSE BLDC GLUCOMTR-MCNC: 173 MG/DL — HIGH (ref 70–99)
GLUCOSE BLDC GLUCOMTR-MCNC: 202 MG/DL — HIGH (ref 70–99)

## 2024-12-07 PROCEDURE — 99232 SBSQ HOSP IP/OBS MODERATE 35: CPT

## 2024-12-07 RX ORDER — SERTRALINE HYDROCHLORIDE 100 MG/1
75 TABLET, FILM COATED ORAL AT BEDTIME
Refills: 0 | Status: DISCONTINUED | OUTPATIENT
Start: 2024-12-08 | End: 2024-12-10

## 2024-12-07 RX ADMIN — FAMOTIDINE 40 MILLIGRAM(S): 20 TABLET, FILM COATED ORAL at 20:04

## 2024-12-07 RX ADMIN — LIDOCAINE 1 PATCH: 40 CREAM TOPICAL at 09:14

## 2024-12-07 RX ADMIN — AMLODIPINE BESYLATE 5 MILLIGRAM(S): 10 TABLET ORAL at 09:14

## 2024-12-07 RX ADMIN — SERTRALINE HYDROCHLORIDE 75 MILLIGRAM(S): 100 TABLET, FILM COATED ORAL at 09:14

## 2024-12-07 RX ADMIN — METOPROLOL TARTRATE 25 MILLIGRAM(S): 100 TABLET, FILM COATED ORAL at 09:14

## 2024-12-07 RX ADMIN — Medication 15 UNIT(S): at 07:35

## 2024-12-07 RX ADMIN — METOPROLOL TARTRATE 25 MILLIGRAM(S): 100 TABLET, FILM COATED ORAL at 13:54

## 2024-12-07 RX ADMIN — Medication 15 UNIT(S): at 16:35

## 2024-12-07 RX ADMIN — METOPROLOL TARTRATE 25 MILLIGRAM(S): 100 TABLET, FILM COATED ORAL at 20:04

## 2024-12-07 RX ADMIN — LIDOCAINE 1 PATCH: 40 CREAM TOPICAL at 19:51

## 2024-12-07 RX ADMIN — Medication 15 UNIT(S): at 11:58

## 2024-12-07 RX ADMIN — LIDOCAINE 1 PATCH: 40 CREAM TOPICAL at 21:53

## 2024-12-07 RX ADMIN — INSULIN GLARGINE 50 UNIT(S): 100 INJECTION, SOLUTION SUBCUTANEOUS at 20:04

## 2024-12-07 RX ADMIN — Medication 2: at 11:57

## 2024-12-07 RX ADMIN — PANTOPRAZOLE SODIUM 40 MILLIGRAM(S): 40 TABLET, DELAYED RELEASE ORAL at 06:35

## 2024-12-07 NOTE — BH INPATIENT PSYCHIATRY PROGRESS NOTE - NSBHMETABOLIC_PSY_ALL_CORE_FT
BMI: BMI (kg/m2): 32.6 (12-03-24 @ 11:06)  HbA1c: A1C with Estimated Average Glucose Result: 10.0 % (11-08-24 @ 06:35)    Glucose: POCT Blood Glucose.: 173 mg/dL (12-07-24 @ 11:24)    BP: 119/82 (12-07-24 @ 08:33) (104/68 - 122/75)Vital Signs Last 24 Hrs  T(C): 36.2 (12-07-24 @ 08:33), Max: 36.7 (12-06-24 @ 15:41)  T(F): 97.2 (12-07-24 @ 08:33), Max: 98 (12-06-24 @ 15:41)  HR: 80 (12-07-24 @ 08:33) (80 - 85)  BP: 119/82 (12-07-24 @ 08:33) (109/71 - 119/82)  BP(mean): --  RR: --  SpO2: --      Lipid Panel:
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
## 2024-12-07 NOTE — BH INPATIENT PSYCHIATRY PROGRESS NOTE - NSBHFUPINTERVALHXFT_PSY_A_CORE
Pt seen and evaluated, chart reviewed. As per nursing reports, no acute events overnight. Ania has been staying in her room, isolative, adherent to treatment. On evaluation, pt presents in bed, smiling appropriately. She reports: "I've been laying down I felt a little dizzy this morning". She reports generally OK mood, although today "is a little hard because it is the anniversary of my father's passing", so she had some anxiety. She reports OK sleep and appetite, denies SI/HI, denies AH.

## 2024-12-07 NOTE — BH INPATIENT PSYCHIATRY PROGRESS NOTE - CURRENT MEDICATION
MEDICATIONS  (STANDING):  amLODIPine   Tablet 5 milliGRAM(s) Oral daily  dextrose 5%. 1000 milliLiter(s) (100 mL/Hr) IV Continuous <Continuous>  dextrose 5%. 1000 milliLiter(s) (50 mL/Hr) IV Continuous <Continuous>  dextrose 50% Injectable 25 Gram(s) IV Push once  dextrose 50% Injectable 12.5 Gram(s) IV Push once  dextrose 50% Injectable 25 Gram(s) IV Push once  famotidine    Tablet 40 milliGRAM(s) Oral at bedtime  glucagon  Injectable 1 milliGRAM(s) IntraMuscular once  insulin glargine Injectable (LANTUS) 50 Unit(s) SubCutaneous at bedtime  insulin lispro (ADMELOG) corrective regimen sliding scale   SubCutaneous three times a day before meals  insulin lispro Injectable (ADMELOG) 15 Unit(s) SubCutaneous three times a day with meals  lidocaine   4% Patch 1 Patch Transdermal daily  metoprolol tartrate 25 milliGRAM(s) Oral three times a day  pantoprazole    Tablet 40 milliGRAM(s) Oral before breakfast  sertraline 75 milliGRAM(s) Oral daily    MEDICATIONS  (PRN):  aluminum hydroxide/magnesium hydroxide/simethicone Suspension 30 milliLiter(s) Oral every 6 hours PRN Dyspepsia  dextrose Oral Gel 15 Gram(s) Oral once PRN Blood Glucose LESS THAN 70 milliGRAM(s)/deciliter  haloperidol     Tablet 5 milliGRAM(s) Oral every 6 hours PRN agitation  hydrOXYzine hydrochloride 25 milliGRAM(s) Oral every 6 hours PRN anxiety/insomnia  methocarbamol 1000 milliGRAM(s) Oral every 8 hours PRN back pain  naproxen 500 milliGRAM(s) Oral every 12 hours PRN pain

## 2024-12-07 NOTE — BH INPATIENT PSYCHIATRY PROGRESS NOTE - PRN MEDS
MEDICATIONS  (PRN):  aluminum hydroxide/magnesium hydroxide/simethicone Suspension 30 milliLiter(s) Oral every 6 hours PRN Dyspepsia  dextrose Oral Gel 15 Gram(s) Oral once PRN Blood Glucose LESS THAN 70 milliGRAM(s)/deciliter  haloperidol     Tablet 5 milliGRAM(s) Oral every 6 hours PRN agitation  hydrOXYzine hydrochloride 25 milliGRAM(s) Oral every 6 hours PRN anxiety/insomnia  methocarbamol 1000 milliGRAM(s) Oral every 8 hours PRN back pain  naproxen 500 milliGRAM(s) Oral every 12 hours PRN pain

## 2024-12-07 NOTE — BH INPATIENT PSYCHIATRY PROGRESS NOTE - NSBHCHARTREVIEWVS_PSY_A_CORE FT
Vital Signs Last 24 Hrs  T(C): 36.2 (12-07-24 @ 08:33), Max: 36.7 (12-06-24 @ 15:41)  T(F): 97.2 (12-07-24 @ 08:33), Max: 98 (12-06-24 @ 15:41)  HR: 80 (12-07-24 @ 08:33) (80 - 85)  BP: 119/82 (12-07-24 @ 08:33) (109/71 - 119/82)  BP(mean): --  RR: --  SpO2: --

## 2024-12-07 NOTE — BH PATIENT PROFILE - NSSBIRTOFTENALCOHOL_GEN_A_CORE
As discussed please return to the emergency room with worsening symptoms. Use lasix as indicated by your doctor. As well as colace indicated. Please follow up with your primary care doctor.    Never

## 2024-12-08 LAB
GLUCOSE BLDC GLUCOMTR-MCNC: 121 MG/DL — HIGH (ref 70–99)
GLUCOSE BLDC GLUCOMTR-MCNC: 233 MG/DL — HIGH (ref 70–99)
GLUCOSE BLDC GLUCOMTR-MCNC: 240 MG/DL — HIGH (ref 70–99)
GLUCOSE BLDC GLUCOMTR-MCNC: 248 MG/DL — HIGH (ref 70–99)

## 2024-12-08 RX ADMIN — METOPROLOL TARTRATE 25 MILLIGRAM(S): 100 TABLET, FILM COATED ORAL at 11:21

## 2024-12-08 RX ADMIN — Medication 4: at 16:57

## 2024-12-08 RX ADMIN — PANTOPRAZOLE SODIUM 40 MILLIGRAM(S): 40 TABLET, DELAYED RELEASE ORAL at 06:40

## 2024-12-08 RX ADMIN — Medication 15 UNIT(S): at 07:53

## 2024-12-08 RX ADMIN — INSULIN GLARGINE 50 UNIT(S): 100 INJECTION, SOLUTION SUBCUTANEOUS at 20:12

## 2024-12-08 RX ADMIN — AMLODIPINE BESYLATE 5 MILLIGRAM(S): 10 TABLET ORAL at 09:40

## 2024-12-08 RX ADMIN — FAMOTIDINE 40 MILLIGRAM(S): 20 TABLET, FILM COATED ORAL at 20:12

## 2024-12-08 RX ADMIN — LIDOCAINE 1 PATCH: 40 CREAM TOPICAL at 19:01

## 2024-12-08 RX ADMIN — Medication 15 UNIT(S): at 16:57

## 2024-12-08 RX ADMIN — METOPROLOL TARTRATE 25 MILLIGRAM(S): 100 TABLET, FILM COATED ORAL at 20:13

## 2024-12-08 RX ADMIN — LIDOCAINE 1 PATCH: 40 CREAM TOPICAL at 09:41

## 2024-12-08 RX ADMIN — SERTRALINE HYDROCHLORIDE 75 MILLIGRAM(S): 100 TABLET, FILM COATED ORAL at 20:13

## 2024-12-08 RX ADMIN — Medication 500 MILLIGRAM(S): at 20:16

## 2024-12-08 RX ADMIN — METOPROLOL TARTRATE 25 MILLIGRAM(S): 100 TABLET, FILM COATED ORAL at 17:36

## 2024-12-08 RX ADMIN — Medication 4: at 11:50

## 2024-12-08 RX ADMIN — Medication 15 UNIT(S): at 11:51

## 2024-12-08 RX ADMIN — Medication 4: at 07:51

## 2024-12-08 NOTE — BH INPATIENT PSYCHIATRY PROGRESS NOTE - NSBHFUPINTERVALHXFT_PSY_A_CORE
Pt seen and evaluated, chart reviewed. As per nursing reports, no acute events overnight. Ania has been staying in her room, isolative, adherent to treatment. On evaluation, pt presents in bed, smiling appropriately. She reports: She reports generally OK mood, although today "is a little hard because it is the anniversary of my father's passing", so she had some anxiety. She reports OK sleep and appetite, denies SI/HI, denies AH.

## 2024-12-08 NOTE — BH INPATIENT PSYCHIATRY PROGRESS NOTE - NSBHMETABOLIC_PSY_ALL_CORE_FT
BMI: BMI (kg/m2): 32.6 (12-03-24 @ 11:06)  HbA1c: A1C with Estimated Average Glucose Result: 10.0 % (11-08-24 @ 06:35)    Glucose: POCT Blood Glucose.: 233 mg/dL (12-08-24 @ 11:37)    BP: 112/55 (12-08-24 @ 08:05) (104/68 - 122/77)Vital Signs Last 24 Hrs  T(C): 36.2 (12-08-24 @ 08:05), Max: 37.3 (12-07-24 @ 15:51)  T(F): 97.2 (12-08-24 @ 08:05), Max: 99.1 (12-07-24 @ 15:51)  HR: 84 (12-08-24 @ 08:05) (81 - 101)  BP: 112/55 (12-08-24 @ 08:05) (110/45 - 122/77)  BP(mean): --  RR: --  SpO2: --      Lipid Panel:

## 2024-12-08 NOTE — BH INPATIENT PSYCHIATRY PROGRESS NOTE - CURRENT MEDICATION
MEDICATIONS  (STANDING):  amLODIPine   Tablet 5 milliGRAM(s) Oral daily  dextrose 5%. 1000 milliLiter(s) (100 mL/Hr) IV Continuous <Continuous>  dextrose 5%. 1000 milliLiter(s) (50 mL/Hr) IV Continuous <Continuous>  dextrose 50% Injectable 25 Gram(s) IV Push once  dextrose 50% Injectable 12.5 Gram(s) IV Push once  dextrose 50% Injectable 25 Gram(s) IV Push once  famotidine    Tablet 40 milliGRAM(s) Oral at bedtime  glucagon  Injectable 1 milliGRAM(s) IntraMuscular once  insulin glargine Injectable (LANTUS) 50 Unit(s) SubCutaneous at bedtime  insulin lispro (ADMELOG) corrective regimen sliding scale   SubCutaneous three times a day before meals  insulin lispro Injectable (ADMELOG) 15 Unit(s) SubCutaneous three times a day with meals  lidocaine   4% Patch 1 Patch Transdermal daily  metoprolol tartrate 25 milliGRAM(s) Oral three times a day  pantoprazole    Tablet 40 milliGRAM(s) Oral before breakfast  sertraline 75 milliGRAM(s) Oral at bedtime    MEDICATIONS  (PRN):  aluminum hydroxide/magnesium hydroxide/simethicone Suspension 30 milliLiter(s) Oral every 6 hours PRN Dyspepsia  dextrose Oral Gel 15 Gram(s) Oral once PRN Blood Glucose LESS THAN 70 milliGRAM(s)/deciliter  haloperidol     Tablet 5 milliGRAM(s) Oral every 6 hours PRN agitation  hydrOXYzine hydrochloride 25 milliGRAM(s) Oral every 6 hours PRN anxiety/insomnia  methocarbamol 1000 milliGRAM(s) Oral every 8 hours PRN back pain  naproxen 500 milliGRAM(s) Oral every 12 hours PRN pain

## 2024-12-08 NOTE — BH INPATIENT PSYCHIATRY PROGRESS NOTE - NSBHCHARTREVIEWVS_PSY_A_CORE FT
Vital Signs Last 24 Hrs  T(C): 36.2 (12-08-24 @ 08:05), Max: 37.3 (12-07-24 @ 15:51)  T(F): 97.2 (12-08-24 @ 08:05), Max: 99.1 (12-07-24 @ 15:51)  HR: 84 (12-08-24 @ 08:05) (81 - 101)  BP: 112/55 (12-08-24 @ 08:05) (110/45 - 122/77)  BP(mean): --  RR: --  SpO2: --
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
## 2024-12-09 ENCOUNTER — APPOINTMENT (OUTPATIENT)
Dept: PODIATRY | Facility: CLINIC | Age: 39
End: 2024-12-09

## 2024-12-09 LAB
GLUCOSE BLDC GLUCOMTR-MCNC: 204 MG/DL — HIGH (ref 70–99)
GLUCOSE BLDC GLUCOMTR-MCNC: 234 MG/DL — HIGH (ref 70–99)
GLUCOSE BLDC GLUCOMTR-MCNC: 262 MG/DL — HIGH (ref 70–99)
GLUCOSE BLDC GLUCOMTR-MCNC: 281 MG/DL — HIGH (ref 70–99)

## 2024-12-09 PROCEDURE — 99232 SBSQ HOSP IP/OBS MODERATE 35: CPT

## 2024-12-09 RX ADMIN — Medication 15 UNIT(S): at 17:14

## 2024-12-09 RX ADMIN — Medication 4: at 11:51

## 2024-12-09 RX ADMIN — METHOCARBAMOL 1000 MILLIGRAM(S): 500 TABLET, FILM COATED ORAL at 11:15

## 2024-12-09 RX ADMIN — METHOCARBAMOL 1000 MILLIGRAM(S): 500 TABLET, FILM COATED ORAL at 20:00

## 2024-12-09 RX ADMIN — INSULIN GLARGINE 50 UNIT(S): 100 INJECTION, SOLUTION SUBCUTANEOUS at 20:57

## 2024-12-09 RX ADMIN — METOPROLOL TARTRATE 25 MILLIGRAM(S): 100 TABLET, FILM COATED ORAL at 20:58

## 2024-12-09 RX ADMIN — LIDOCAINE 1 PATCH: 40 CREAM TOPICAL at 10:22

## 2024-12-09 RX ADMIN — PANTOPRAZOLE SODIUM 40 MILLIGRAM(S): 40 TABLET, DELAYED RELEASE ORAL at 06:54

## 2024-12-09 RX ADMIN — LIDOCAINE 1 PATCH: 40 CREAM TOPICAL at 23:15

## 2024-12-09 RX ADMIN — HYDROXYZINE HYDROCHLORIDE 25 MILLIGRAM(S): 25 TABLET, FILM COATED ORAL at 20:58

## 2024-12-09 RX ADMIN — FAMOTIDINE 40 MILLIGRAM(S): 20 TABLET, FILM COATED ORAL at 20:59

## 2024-12-09 RX ADMIN — METOPROLOL TARTRATE 25 MILLIGRAM(S): 100 TABLET, FILM COATED ORAL at 10:22

## 2024-12-09 RX ADMIN — LIDOCAINE 1 PATCH: 40 CREAM TOPICAL at 23:16

## 2024-12-09 RX ADMIN — SERTRALINE HYDROCHLORIDE 75 MILLIGRAM(S): 100 TABLET, FILM COATED ORAL at 20:58

## 2024-12-09 RX ADMIN — Medication 15 UNIT(S): at 07:35

## 2024-12-09 RX ADMIN — Medication 6: at 06:50

## 2024-12-09 RX ADMIN — Medication 6: at 17:14

## 2024-12-09 RX ADMIN — AMLODIPINE BESYLATE 5 MILLIGRAM(S): 10 TABLET ORAL at 08:21

## 2024-12-09 NOTE — BH INPATIENT PSYCHIATRY DISCHARGE NOTE - NSDCCPCAREPLAN_GEN_ALL_CORE_FT
PRINCIPAL DISCHARGE DIAGNOSIS  Diagnosis: Depression, unspecified  Assessment and Plan of Treatment: Continue current medication regimen and follow up with aftercare appointments      SECONDARY DISCHARGE DIAGNOSES  Diagnosis: Cluster B personality disorder in adult  Assessment and Plan of Treatment: Continue current medication regimen and follow up with aftercare appointments

## 2024-12-09 NOTE — BH INPATIENT PSYCHIATRY DISCHARGE NOTE - NSBHMETABOLIC_PSY_ALL_CORE_FT
BMI: BMI (kg/m2): 32.6 (12-03-24 @ 11:06)  HbA1c: A1C with Estimated Average Glucose Result: 10.0 % (11-08-24 @ 06:35)    Glucose: POCT Blood Glucose.: 281 mg/dL (12-09-24 @ 06:38)    BP: 150/81 (12-09-24 @ 07:45) (109/71 - 150/81)Vital Signs Last 24 Hrs  T(C): 36.5 (12-09-24 @ 07:45), Max: 37.1 (12-08-24 @ 16:01)  T(F): 97.7 (12-09-24 @ 07:45), Max: 98.8 (12-08-24 @ 16:01)  HR: 85 (12-09-24 @ 07:45) (85 - 92)  BP: 150/81 (12-09-24 @ 07:45) (119/73 - 150/81)  BP(mean): --  RR: --  SpO2: --      Lipid Panel:

## 2024-12-09 NOTE — BH INPATIENT PSYCHIATRY DISCHARGE NOTE - NSDCMRMEDTOKEN_GEN_ALL_CORE_FT
azelastine nasal: 2 spray(s) 2 times a day  Basaglar KwikPen 100 units/mL subcutaneous solution: 60 subcutaneous once a day  famotidine 40 mg oral tablet: 1 tab(s) orally once a day  ibuprofen 600 mg oral tablet: 1 tab(s) orally every 8 hours as needed for  pain  insulin lispro 100 units/mL injectable solution: 18 unit(s) injectable 3 times a day (before meals)  methocarbamol 500 mg oral tablet: 2 tab(s) orally every 8 hours as needed for back pain Do not drive or operate any heavy machinery on this medication, as it can cause drowsiness.  metoprolol tartrate 25 mg oral tablet: 1 tab(s) orally 3 times a day  naproxen 500 mg oral tablet: 1 tab(s) orally every 12 hours  Norvasc 5 mg oral tablet: 1 tab(s) orally once a day  omeprazole 40 mg oral delayed release capsule: 1 cap(s) orally once a day  rimegepant 75 mg oral tablet, disintegratin tab(s) orally once a day as needed for migraine  sertraline 25 mg oral tablet: 2 tab(s) orally once a day  Zofran 4 mg oral tablet: 1 tab(s) orally every 8 hours as needed for  nausea   famotidine 40 mg oral tablet: 1 tab(s) orally once a day (at bedtime)  insulin glargine 100 units/mL subcutaneous solution: 50 unit(s) subcutaneous once a day (at bedtime)  insulin lispro 100 units/mL injectable solution: 15 unit(s) subcutaneous 3 times a day (with meals)  methocarbamol 500 mg oral tablet: 2 tab(s) orally every 8 hours As needed back pain  metoprolol tartrate 25 mg oral tablet: 1 tab(s) orally 3 times a day  naproxen 500 mg oral tablet: 1 tab(s) orally every 12 hours As needed pain  Norvasc 5 mg oral tablet: 1 tab(s) orally once a day  omeprazole 40 mg oral delayed release capsule: 1 cap(s) orally once a day  sertraline 25 mg oral tablet: 3 tab(s) orally once a day (at bedtime) x 14 days Continue to take as prescribed until told otherwise by your provider

## 2024-12-09 NOTE — BH INPATIENT PSYCHIATRY DISCHARGE NOTE - ATTENDING DISCHARGE PHYSICAL EXAMINATION:
I interviewed the patient and discussed with NP Feli. Ania presents with full euthymic affect, reports feeling better, denies SI/HI and is future oriented. Agree with the assessment and plan.

## 2024-12-09 NOTE — BH INPATIENT PSYCHIATRY DISCHARGE NOTE - NSDCRECOMMENDMEDICALFT_PSY_ALL_CORE
Continue to monitor EKG, weight, BP, CBC, CMP, Hemoglobin A1c, fasting glucose and Lipid profile. Follow up with your PCP and endocrinologist within 1-2 weeks

## 2024-12-09 NOTE — BH INPATIENT PSYCHIATRY DISCHARGE NOTE - NSBHDCMEDICALFT_PSY_A_CORE
# DM  - last admission was well controlled on 50 lantus qHS; 15 lispro pre prandial; +2 (moderate) ISS; would resume this admission and titrate as needed  - give stat lispro sq now for FS > 400  - cc diet  - f/u endo OP on d/c  - would monitor closely, has pump at home, prone to hyperglycemia    # HCM  - c/w home medications  - keep outpatient appointments, specifically hepatology, endo, GI;

## 2024-12-09 NOTE — BH INPATIENT PSYCHIATRY PROGRESS NOTE - NSBHMETABOLIC_PSY_ALL_CORE_FT
BMI: BMI (kg/m2): 32.6 (12-03-24 @ 11:06)  HbA1c: A1C with Estimated Average Glucose Result: 10.0 % (11-08-24 @ 06:35)    Glucose: POCT Blood Glucose.: 281 mg/dL (12-09-24 @ 06:38)    BP: 150/81 (12-09-24 @ 07:45) (109/71 - 150/81)Vital Signs Last 24 Hrs  T(C): 36.5 (12-09-24 @ 07:45), Max: 37.1 (12-08-24 @ 16:01)  T(F): 97.7 (12-09-24 @ 07:45), Max: 98.8 (12-08-24 @ 16:01)  HR: 85 (12-09-24 @ 07:45) (85 - 92)  BP: 150/81 (12-09-24 @ 07:45) (119/73 - 150/81)  BP(mean): --  RR: --  SpO2: --      Lipid Panel:  BMI: BMI (kg/m2): 32.6 (12-03-24 @ 11:06)  HbA1c: A1C with Estimated Average Glucose Result: 10.0 % (11-08-24 @ 06:35)    Glucose: POCT Blood Glucose.: 204 mg/dL (12-09-24 @ 11:26)    BP: 150/81 (12-09-24 @ 07:45) (109/71 - 150/81)Vital Signs Last 24 Hrs  T(C): 36.5 (12-09-24 @ 07:45), Max: 37.1 (12-08-24 @ 16:01)  T(F): 97.7 (12-09-24 @ 07:45), Max: 98.8 (12-08-24 @ 16:01)  HR: 85 (12-09-24 @ 07:45) (85 - 92)  BP: 150/81 (12-09-24 @ 07:45) (119/73 - 150/81)  BP(mean): --  RR: --  SpO2: --      Lipid Panel:  BMI: BMI (kg/m2): 32.6 (12-03-24 @ 11:06)  HbA1c: A1C with Estimated Average Glucose Result: 10.0 % (11-08-24 @ 06:35)    Glucose: POCT Blood Glucose.: 204 mg/dL (12-09-24 @ 11:26)    BP: 141/80 (12-09-24 @ 15:43) (110/45 - 150/81)Vital Signs Last 24 Hrs  T(C): 36.7 (12-09-24 @ 15:43), Max: 37.1 (12-08-24 @ 16:01)  T(F): 98.1 (12-09-24 @ 15:43), Max: 98.8 (12-08-24 @ 16:01)  HR: 95 (12-09-24 @ 15:43) (85 - 95)  BP: 141/80 (12-09-24 @ 15:43) (119/73 - 150/81)  BP(mean): --  RR: --  SpO2: --      Lipid Panel:

## 2024-12-09 NOTE — BH INPATIENT PSYCHIATRY PROGRESS NOTE - CURRENT MEDICATION
MEDICATIONS  (STANDING):  amLODIPine   Tablet 5 milliGRAM(s) Oral daily  dextrose 5%. 1000 milliLiter(s) (100 mL/Hr) IV Continuous <Continuous>  dextrose 5%. 1000 milliLiter(s) (50 mL/Hr) IV Continuous <Continuous>  dextrose 50% Injectable 25 Gram(s) IV Push once  dextrose 50% Injectable 12.5 Gram(s) IV Push once  dextrose 50% Injectable 25 Gram(s) IV Push once  famotidine    Tablet 40 milliGRAM(s) Oral at bedtime  glucagon  Injectable 1 milliGRAM(s) IntraMuscular once  insulin glargine Injectable (LANTUS) 50 Unit(s) SubCutaneous at bedtime  insulin lispro (ADMELOG) corrective regimen sliding scale   SubCutaneous three times a day before meals  insulin lispro Injectable (ADMELOG) 15 Unit(s) SubCutaneous three times a day with meals  lidocaine   4% Patch 1 Patch Transdermal daily  metoprolol tartrate 25 milliGRAM(s) Oral three times a day  pantoprazole    Tablet 40 milliGRAM(s) Oral before breakfast  sertraline 75 milliGRAM(s) Oral at bedtime    MEDICATIONS  (PRN):  aluminum hydroxide/magnesium hydroxide/simethicone Suspension 30 milliLiter(s) Oral every 6 hours PRN Dyspepsia  dextrose Oral Gel 15 Gram(s) Oral once PRN Blood Glucose LESS THAN 70 milliGRAM(s)/deciliter  haloperidol     Tablet 5 milliGRAM(s) Oral every 6 hours PRN agitation  hydrOXYzine hydrochloride 25 milliGRAM(s) Oral every 6 hours PRN anxiety/insomnia  methocarbamol 1000 milliGRAM(s) Oral every 8 hours PRN back pain  naproxen 500 milliGRAM(s) Oral every 12 hours PRN pain   MEDICATIONS  (STANDING):  amLODIPine   Tablet 5 milliGRAM(s) Oral daily  dextrose 5%. 1000 milliLiter(s) (50 mL/Hr) IV Continuous <Continuous>  dextrose 5%. 1000 milliLiter(s) (100 mL/Hr) IV Continuous <Continuous>  dextrose 50% Injectable 25 Gram(s) IV Push once  dextrose 50% Injectable 12.5 Gram(s) IV Push once  dextrose 50% Injectable 25 Gram(s) IV Push once  famotidine    Tablet 40 milliGRAM(s) Oral at bedtime  glucagon  Injectable 1 milliGRAM(s) IntraMuscular once  insulin glargine Injectable (LANTUS) 50 Unit(s) SubCutaneous at bedtime  insulin lispro (ADMELOG) corrective regimen sliding scale   SubCutaneous three times a day before meals  insulin lispro Injectable (ADMELOG) 15 Unit(s) SubCutaneous three times a day with meals  lidocaine   4% Patch 1 Patch Transdermal daily  metoprolol tartrate 25 milliGRAM(s) Oral three times a day  pantoprazole    Tablet 40 milliGRAM(s) Oral before breakfast  sertraline 75 milliGRAM(s) Oral at bedtime    MEDICATIONS  (PRN):  aluminum hydroxide/magnesium hydroxide/simethicone Suspension 30 milliLiter(s) Oral every 6 hours PRN Dyspepsia  dextrose Oral Gel 15 Gram(s) Oral once PRN Blood Glucose LESS THAN 70 milliGRAM(s)/deciliter  haloperidol     Tablet 5 milliGRAM(s) Oral every 6 hours PRN agitation  hydrOXYzine hydrochloride 25 milliGRAM(s) Oral every 6 hours PRN anxiety/insomnia  methocarbamol 1000 milliGRAM(s) Oral every 8 hours PRN back pain  naproxen 500 milliGRAM(s) Oral every 12 hours PRN pain
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
## 2024-12-09 NOTE — BH INPATIENT PSYCHIATRY DISCHARGE NOTE - NSBHDCSIGEVENTSFT_PSY_A_CORE
None Pt had been doing very well during admission, initially presented euthymic with bright affect, noncongruent with endorsed depression and anxiety leading to admission. Pt continued to do well until start of discharge planning, pt began endorsing worsening mood and passive SI, stating she did not want to be discharged. Pt reported not wanting to return to her OP providers and being upset that she was told she couldn't switch providers. Pt admitted coming to the hospital as a respite from her residence. Pt had been doing very well during admission, initially presented euthymic with bright affect, noncongruent with endorsed depression and anxiety leading to admission. Pt continued to do well until start of discharge planning, pt began endorsing worsening mood and passive SI with maladaptive behaviors, stating she did not want to be discharged. Pt reported not wanting to return to her OP providers and being upset that she was told she couldn't switch providers. Pt admitted coming to the hospital as a respite from her residence.

## 2024-12-09 NOTE — BH INPATIENT PSYCHIATRY PROGRESS NOTE - NSBHFUPINTERVALHXFT_PSY_A_CORE
Pt seen and evaluated, chart reviewed. As per nursing reports, no acute events overnight.  Pt seen and evaluated, chart reviewed. As per nursing report, no acute events overnight. On evaluation, pt presents in bed, awakes easily to voice. She endorses anxious ruminations on next steps, reports numerous medical appointments and need to start PT. She reports feeling "sad" yesterday d/t death anniversary of her father. She admits to passive SI at the time, reports effective coping skills include distraction/sleep. She denies current active SI, reports motivated to f/u OP and continue to get better. She reports fair sleep and appetite. Denies AVH, paranoia. She denies negative side effects of medications. Discharge planning started. Pt seen and evaluated, chart reviewed. As per nursing report, no acute events overnight. On evaluation, pt presents in bed, awakes easily to voice. She endorses anxious ruminations on next steps, reports numerous medical appointments and need to start PT. She reports feeling "sad" and having a panic attack yesterday d/t death anniversary of her father. She admits to passive SI at the time, reports coping skill include distraction/sleep. She denies current active SI, reports motivated to continue to get better. She reports fair sleep and appetite. Denies AVH, paranoia. She denies negative side effects of medications. Discharge planning started.  Of note, later in day, RN called reporting pt refused to eat lunch and stated she will "starve herself" because she does not want to get discharged tomorrow. Spoke with pt's mother who reports pt had called her yesterday saying she was anxious and suicidal because she did not want to f/u with prior OP providers 2/2 dissatisfaction with treatment, states pt wanted anxiety medication xanax and was not being given medication. When attempting to speak with pt, pt presents in bed with eyes closed and tearful, refusing to engage in interview despite several attempts.  Pt seen and evaluated, chart reviewed. As per nursing report, no acute events overnight. On evaluation, pt presents in bed, awakes easily to voice. She endorses anxious ruminations on next steps, reports numerous medical appointments and need to start PT. She reports feeling "sad" and having a panic attack yesterday d/t death anniversary of her father. She admits to passive SI at the time, reports coping skill include distraction/sleep. She denies current active SI, reports motivated to continue to get better. She reports fair sleep and appetite. Denies AVH, paranoia. She denies negative side effects of medications. Discharge planning started.  Of note, later in day, RN called reporting pt refused to eat lunch and stated she will "starve herself" because she does not want to get discharged tomorrow. Spoke with pt's mother who reports pt had called her yesterday saying she was anxious and suicidal because she did not want to f/u with prior OP providers 2/2 dissatisfaction with treatment, states pt also wanted anxiety medication xanax and was not being given medication. When attempting to speak with pt, pt presents in bed with eyes closed and tearful, refusing to engage in interview despite several attempts.
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
## 2024-12-09 NOTE — BH INPATIENT PSYCHIATRY DISCHARGE NOTE - HOSPITAL COURSE
Pt self-presented requesting for admission reporting worsening depression and anxiety in setting of upcoming death anniversaries and interpersonal conflicts, later also reported dissatisfaction with OP providers and being upset that she was told she could not switch providers. Pt presented on unit with euthymic affect, noncongruent with endorsed depression and anxiety, with fair ADLs. Pt endorsed low mood, energy and low motivation, otherwise denied changes in sleep/appetite/interests, denied SI. Pt reported hospitalization as a respite to residence. Pt was resumed on zoloft, titrated zoloft 75 mg qhs; pt requested for "anxiety medication", psychoeducation provided on SSRIs and PRNs, pt declined offered PRN hydroxyzine. Pt has been future-oriented and goal-directed since admission, endorsing need to f/u medical appointments and 3x/week PT, declining IOP/PHP 2/2 medical appts. Pt observed less isolative on unit and in groups, seen socializing appropriately with group milieu and utilizing phone in good spirits. Pt endorsed overall improved mood and anxiety since admission. Denied active suicidal or homicidal ideations. Pt able to safety plan. Denied any auditory or visual hallucinations. Pt with good ADLs. Pt able to vocalize and utilize prosocial behaviors to address needs, and engage appropriately with staff and group milieu. Abnormalities in mental status improved sufficiently to permit outgoing care in the outpatient setting. Pt was evaluated by treatment team, pt is stable for discharge and shows no imminent danger to self, others or property at this time. She understands and agrees with treatment plan and following up with outpatient. Psychoeducation provided regarding diagnosis, medications, treatment and follow up. Risks, benefits, alternatives discussed, all questions and concerns addressed and answered. Reviewed crisis intervention with verbalized understanding. Pt self-presented requesting for admission reporting worsening depression and anxiety in setting of upcoming death anniversaries and interpersonal conflicts, later also reported dissatisfaction with OP providers and being upset that she was told she could not switch providers, as well as respite from residence. Pt presented on unit with euthymic affect, noncongruent with endorsed depression and anxiety, with fair ADLs which is consistent as per collateral. Pt endorsed low mood, energy and low motivation, otherwise denied changes in sleep/appetite/interests, denied SI. Pt was resumed on zoloft, titrated zoloft 75 mg qhs; pt requested for "anxiety medication", psychoeducation provided on SSRIs and PRNs, pt declined offered PRN hydroxyzine. Pt has been future-oriented and goal-directed since admission, endorsing need to f/u medical appointments and 3x/week PT, declining IOP/PHP 2/2 medical appts. Pt observed less isolative on unit and in groups, seen socializing appropriately with group milieu and utilizing phone in good spirits. Pt endorsed overall improved mood and anxiety. Denied suicidal or homicidal ideations. Pt able to safety plan. Denied any auditory or visual hallucinations. Pt with good ADLs. Pt able to vocalize and utilize prosocial behaviors to address needs, and engage appropriately with staff and group milieu. Abnormalities in mental status improved sufficiently to permit outgoing care in the outpatient setting. Pt was evaluated by treatment team, pt is stable for discharge and shows no imminent danger to self, others or property at this time. She understands and agrees with treatment plan and following up with outpatient. Psychoeducation provided regarding diagnosis, medications, treatment and follow up. Risks, benefits, alternatives discussed, all questions and concerns addressed and answered. Reviewed crisis intervention with verbalized understanding.

## 2024-12-09 NOTE — BH INPATIENT PSYCHIATRY DISCHARGE NOTE - NSDCRECOMMENDLABFT_PSY_ALL_CORE
Consistent carbs diet Continue to monitor EKG, weight, BP, CBC, CMP, Hemoglobin A1c, fasting glucose and Lipid profile.

## 2024-12-09 NOTE — BH INPATIENT PSYCHIATRY DISCHARGE NOTE - NSBHFUPINTERVALHXFT_PSY_A_CORE
Pt seen and evaluated, chart reviewed. As per nursing report, no acute events overnight. On evaluation, pt presents with bright affect, greets treatment pleasantly. She reports she is "good" and denies any new complaints. She presents future-oriented and goal-directed, endorses goal to f/u with medical appointments and start PT. She reports fair mood, sleep and appetite. She reports improved anxiety, endorses effective coping skills include distraction and deep breathing. She denies AVH, paranoia. She denies SI/HI, intent and plan. She denies negative side effects of medications. Discharge today. Pt and pt's mother verbalizes understanding and agrees to discharge instructions.  Spoke with pt's mother- denies safety concerns on discharge.

## 2024-12-09 NOTE — BH INPATIENT PSYCHIATRY PROGRESS NOTE - NSBHCHARTREVIEWVS_PSY_A_CORE FT
Vital Signs Last 24 Hrs  T(C): 36.5 (12-09-24 @ 07:45), Max: 37.1 (12-08-24 @ 16:01)  T(F): 97.7 (12-09-24 @ 07:45), Max: 98.8 (12-08-24 @ 16:01)  HR: 85 (12-09-24 @ 07:45) (85 - 92)  BP: 150/81 (12-09-24 @ 07:45) (119/73 - 150/81)  BP(mean): --  RR: --  SpO2: --     Vital Signs Last 24 Hrs  T(C): 36.7 (12-09-24 @ 15:43), Max: 37.1 (12-08-24 @ 16:01)  T(F): 98.1 (12-09-24 @ 15:43), Max: 98.8 (12-08-24 @ 16:01)  HR: 95 (12-09-24 @ 15:43) (85 - 95)  BP: 141/80 (12-09-24 @ 15:43) (119/73 - 150/81)  BP(mean): --  RR: --  SpO2: --

## 2024-12-10 VITALS — TEMPERATURE: 98 F | HEART RATE: 84 BPM | SYSTOLIC BLOOD PRESSURE: 117 MMHG | DIASTOLIC BLOOD PRESSURE: 72 MMHG

## 2024-12-10 LAB — GLUCOSE BLDC GLUCOMTR-MCNC: 271 MG/DL — HIGH (ref 70–99)

## 2024-12-10 PROCEDURE — 99238 HOSP IP/OBS DSCHRG MGMT 30/<: CPT

## 2024-12-10 RX ORDER — SERTRALINE HYDROCHLORIDE 100 MG/1
3 TABLET, FILM COATED ORAL
Qty: 42 | Refills: 0
Start: 2024-12-10 | End: 2024-12-23

## 2024-12-10 RX ORDER — METHOCARBAMOL 500 MG/1
2 TABLET, FILM COATED ORAL
Qty: 0 | Refills: 0 | DISCHARGE
Start: 2024-12-10

## 2024-12-10 RX ORDER — FAMOTIDINE 20 MG/1
1 TABLET, FILM COATED ORAL
Qty: 0 | Refills: 0 | DISCHARGE
Start: 2024-12-10

## 2024-12-10 RX ORDER — INSULIN GLARGINE 100 [IU]/ML
50 INJECTION, SOLUTION SUBCUTANEOUS
Qty: 0 | Refills: 0 | DISCHARGE
Start: 2024-12-10

## 2024-12-10 RX ORDER — IBUPROFEN 200 MG
1 TABLET ORAL
Refills: 0 | DISCHARGE

## 2024-12-10 RX ORDER — METOPROLOL TARTRATE 100 MG/1
1 TABLET, FILM COATED ORAL
Qty: 0 | Refills: 0 | DISCHARGE
Start: 2024-12-10

## 2024-12-10 RX ORDER — NAPROXEN SODIUM 275 MG
1 TABLET ORAL
Qty: 0 | Refills: 0 | DISCHARGE
Start: 2024-12-10

## 2024-12-10 RX ORDER — NAPROXEN SODIUM 275 MG
1 TABLET ORAL
Refills: 0 | DISCHARGE

## 2024-12-10 RX ORDER — ONDANSETRON HYDROCHLORIDE 4 MG/1
1 TABLET, FILM COATED ORAL
Refills: 0 | DISCHARGE

## 2024-12-10 RX ADMIN — LIDOCAINE 1 PATCH: 40 CREAM TOPICAL at 08:56

## 2024-12-10 RX ADMIN — METOPROLOL TARTRATE 25 MILLIGRAM(S): 100 TABLET, FILM COATED ORAL at 08:56

## 2024-12-10 RX ADMIN — Medication 15 UNIT(S): at 07:28

## 2024-12-10 RX ADMIN — Medication 6: at 07:28

## 2024-12-10 RX ADMIN — AMLODIPINE BESYLATE 5 MILLIGRAM(S): 10 TABLET ORAL at 08:55

## 2024-12-10 RX ADMIN — PANTOPRAZOLE SODIUM 40 MILLIGRAM(S): 40 TABLET, DELAYED RELEASE ORAL at 07:30

## 2024-12-10 NOTE — BH CHART NOTE - DETAILS
Pt denied SI until start of discharge planning, endorsed passive SI with maladaptive behaviors, stating she did not want to be discharged on planned date, reported admission as respite from residence. Pt reported not wanting to return to her OP providers and being upset that she was told she couldn't switch providers.

## 2024-12-10 NOTE — BH INPATIENT PSYCHIATRY PROGRESS NOTE - NSTXDEPRESINTERMD_PSY_ALL_CORE
Medications management, encourage groups/DBT

## 2024-12-10 NOTE — BH INPATIENT PSYCHIATRY PROGRESS NOTE - NSTXDIABGOAL_PSY_ALL_CORE
Will verbalize 2 signs and symptoms of hypo and hyperglycemia

## 2024-12-10 NOTE — BH INPATIENT PSYCHIATRY PROGRESS NOTE - NSTXCOPEINTERMD_PSY_ALL_CORE
Encourage groups/DBT

## 2024-12-10 NOTE — BH INPATIENT PSYCHIATRY PROGRESS NOTE - NSBHCHARTREVIEWVS_PSY_A_CORE FT
Vital Signs Last 24 Hrs  T(C): 36.4 (12-10-24 @ 08:53), Max: 36.7 (12-09-24 @ 15:43)  T(F): 97.6 (12-10-24 @ 08:53), Max: 98.1 (12-09-24 @ 15:43)  HR: 84 (12-10-24 @ 08:53) (84 - 95)  BP: 117/72 (12-10-24 @ 08:53) (117/72 - 141/80)  BP(mean): --  RR: --  SpO2: --

## 2024-12-10 NOTE — BH INPATIENT PSYCHIATRY PROGRESS NOTE - NSTXDIABINTERMD_PSY_ALL_CORE
Medicine consult

## 2024-12-10 NOTE — BH INPATIENT PSYCHIATRY PROGRESS NOTE - NSBHATTESTTYPEVISIT_PSY_A_CORE
On-site Attending supervising JABARI (99XXX codes)
Attending Only
On-site Attending supervising JABARI (99XXX codes)
On-site Attending supervising JABARI (99XXX codes)
Attending Only
On-site Attending supervising JABARI (99XXX codes)

## 2024-12-10 NOTE — BH CHART NOTE - RISK ASSESSMENT
Suicide and risk assessment performed prior to discharge. The patient with low acute risk and elevated chronic risk. Protective factors include help-seeking bx, denying SI/HI, future orientation, goal-directed, no SIB, able to safety plan, verbalize reasons for living, improved coping skills. Risk factors include presenting/hx illness. Immediate risk was minimized by inpatient admission to a safe environment with appropriate supervision and limited access to lethal means. Future risk was minimized before discharge by treatment of acute episode, maximizing outpatient support, providing relevant patient education, discussing emergency procedures, and ensuring close follow-up. The patient remains at a low risk of self-harm, and such risk cannot be further ameliorated by continued inpatient treatment and the patient is therefore appropriate for discharge.

## 2024-12-10 NOTE — BH INPATIENT PSYCHIATRY PROGRESS NOTE - NSBHMETABOLIC_PSY_ALL_CORE_FT
BMI: BMI (kg/m2): 32.6 (12-03-24 @ 11:06)  HbA1c: A1C with Estimated Average Glucose Result: 10.0 % (11-08-24 @ 06:35)    Glucose: POCT Blood Glucose.: 271 mg/dL (12-10-24 @ 06:36)    BP: 117/72 (12-10-24 @ 08:53) (110/45 - 150/81)Vital Signs Last 24 Hrs  T(C): 36.4 (12-10-24 @ 08:53), Max: 36.7 (12-09-24 @ 15:43)  T(F): 97.6 (12-10-24 @ 08:53), Max: 98.1 (12-09-24 @ 15:43)  HR: 84 (12-10-24 @ 08:53) (84 - 95)  BP: 117/72 (12-10-24 @ 08:53) (117/72 - 141/80)  BP(mean): --  RR: --  SpO2: --      Lipid Panel:

## 2024-12-10 NOTE — BH INPATIENT PSYCHIATRY PROGRESS NOTE - NSTXDIABDATEEST_PSY_ALL_CORE
04-Dec-2024
04-Dec-2024
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 Discussed underlying risk of malignancy, progression to atypia. Discussed risks of procedure including but not limited to VTE, SSI, damage to bowel, bladder, fluid overload, uterine perforation. Discussed risks of IUD migration, perforation. All questions answered, tasked to be scheduled\par \par Felicita Martinez MD  
04-Dec-2024
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
## 2024-12-10 NOTE — BH INPATIENT PSYCHIATRY PROGRESS NOTE - ATTENDING COMMENTS
Interviewed patient with the NP Feli. Patient presents with somatic complaints but reports feeling better emotionally. Affect is full. No SI/HI. Agree with the assessment and plan. 
I interviewed the patient and discussed with NP Feli. Ania presents with full euthymic affect, reports feeling better, denies SI/HI and is future oriented. Agree with the assessment and plan.

## 2024-12-10 NOTE — BH INPATIENT PSYCHIATRY PROGRESS NOTE - NSDCCRITERIA_PSY_ALL_CORE
When pt is no longer an acute or imminent risk of harm to self or/and others, and is able to care for self safely, pt may then be discharged

## 2024-12-10 NOTE — BH INPATIENT PSYCHIATRY PROGRESS NOTE - TELEPSYCHIATRY?
No
Hydroxychloroquine Counseling:  I discussed with the patient that a baseline ophthalmologic exam is needed at the start of therapy and every year thereafter while on therapy. A CBC may also be warranted for monitoring.  The side effects of this medication were discussed with the patient, including but not limited to agranulocytosis, aplastic anemia, seizures, rashes, retinopathy, and liver toxicity. Patient instructed to call the office should any adverse effect occur.  The patient verbalized understanding of the proper use and possible adverse effects of Plaquenil.  All the patient's questions and concerns were addressed.

## 2024-12-10 NOTE — BH INPATIENT PSYCHIATRY PROGRESS NOTE - NSBHATTESTBILLING_PSY_A_CORE
73461-Sxmiawgibk OBS or IP - moderate complexity OR 35-49 mins
26229-Sdusoqhkod OBS or IP - moderate complexity OR 35-49 mins
15418-Wgptsmjaio OBS or IP - moderate complexity OR 35-49 mins
46272-Fjlskmwsvf OBS or IP - moderate complexity OR 35-49 mins
97029-Qpbmscwfey OBS or IP - moderate complexity OR 35-49 mins
76001-Ojlrpwkqqz OBS or IP - moderate complexity OR 35-49 mins

## 2024-12-10 NOTE — BH INPATIENT PSYCHIATRY PROGRESS NOTE - NSBHCONSBHPROVDETAILS_PSY_A_CORE  FT
Dr Jc Curiel psychiatrist  or [phone]
Dr Curiel ([phone])- reports pt primary dx more likely personality d/o. Reports pt has been on multiple medication trials as pt often attributes sx to medications (including but not limited to lamictal and Lexapro (c/o fatigued), prozac and cymbalta (initially did well but then pt reported feeling worse), most recently started on zoloft.

## 2024-12-11 ENCOUNTER — APPOINTMENT (OUTPATIENT)
Dept: PSYCHIATRY | Facility: CLINIC | Age: 39
End: 2024-12-11

## 2024-12-11 ENCOUNTER — APPOINTMENT (OUTPATIENT)
Dept: OPHTHALMOLOGY | Facility: CLINIC | Age: 39
End: 2024-12-11

## 2024-12-11 ENCOUNTER — EMERGENCY (EMERGENCY)
Facility: HOSPITAL | Age: 39
LOS: 0 days | Discharge: HOME | End: 2024-12-11
Attending: STUDENT IN AN ORGANIZED HEALTH CARE EDUCATION/TRAINING PROGRAM
Payer: MEDICAID

## 2024-12-11 ENCOUNTER — NON-APPOINTMENT (OUTPATIENT)
Age: 39
End: 2024-12-11

## 2024-12-11 VITALS
HEART RATE: 84 BPM | OXYGEN SATURATION: 97 % | RESPIRATION RATE: 15 BRPM | HEIGHT: 64 IN | TEMPERATURE: 97 F | DIASTOLIC BLOOD PRESSURE: 84 MMHG | SYSTOLIC BLOOD PRESSURE: 152 MMHG

## 2024-12-11 DIAGNOSIS — E10.40 TYPE 1 DIABETES MELLITUS WITH DIABETIC NEUROPATHY, UNSPECIFIED: ICD-10-CM

## 2024-12-11 DIAGNOSIS — R11.0 NAUSEA: ICD-10-CM

## 2024-12-11 DIAGNOSIS — K64.8 OTHER HEMORRHOIDS: ICD-10-CM

## 2024-12-11 DIAGNOSIS — R19.7 DIARRHEA, UNSPECIFIED: ICD-10-CM

## 2024-12-11 DIAGNOSIS — Z88.7 ALLERGY STATUS TO SERUM AND VACCINE: ICD-10-CM

## 2024-12-11 DIAGNOSIS — Z88.0 ALLERGY STATUS TO PENICILLIN: ICD-10-CM

## 2024-12-11 DIAGNOSIS — F60.3 BORDERLINE PERSONALITY DISORDER: ICD-10-CM

## 2024-12-11 DIAGNOSIS — Z88.1 ALLERGY STATUS TO OTHER ANTIBIOTIC AGENTS: ICD-10-CM

## 2024-12-11 DIAGNOSIS — F41.9 ANXIETY DISORDER, UNSPECIFIED: ICD-10-CM

## 2024-12-11 DIAGNOSIS — K21.9 GASTRO-ESOPHAGEAL REFLUX DISEASE WITHOUT ESOPHAGITIS: ICD-10-CM

## 2024-12-11 LAB
ALBUMIN SERPL ELPH-MCNC: 4 G/DL — SIGNIFICANT CHANGE UP (ref 3.5–5.2)
ALP SERPL-CCNC: 148 U/L — HIGH (ref 30–115)
ALT FLD-CCNC: 36 U/L — SIGNIFICANT CHANGE UP (ref 0–41)
ANION GAP SERPL CALC-SCNC: 8 MMOL/L — SIGNIFICANT CHANGE UP (ref 7–14)
AST SERPL-CCNC: 26 U/L — SIGNIFICANT CHANGE UP (ref 0–41)
BASOPHILS # BLD AUTO: 0.05 K/UL — SIGNIFICANT CHANGE UP (ref 0–0.2)
BASOPHILS NFR BLD AUTO: 0.6 % — SIGNIFICANT CHANGE UP (ref 0–1)
BILIRUB SERPL-MCNC: <0.2 MG/DL — SIGNIFICANT CHANGE UP (ref 0.2–1.2)
BUN SERPL-MCNC: 14 MG/DL — SIGNIFICANT CHANGE UP (ref 10–20)
CALCIUM SERPL-MCNC: 9.8 MG/DL — SIGNIFICANT CHANGE UP (ref 8.4–10.5)
CHLORIDE SERPL-SCNC: 101 MMOL/L — SIGNIFICANT CHANGE UP (ref 98–110)
CO2 SERPL-SCNC: 26 MMOL/L — SIGNIFICANT CHANGE UP (ref 17–32)
CREAT SERPL-MCNC: 0.6 MG/DL — LOW (ref 0.7–1.5)
EGFR: 117 ML/MIN/1.73M2 — SIGNIFICANT CHANGE UP
EOSINOPHIL # BLD AUTO: 0.08 K/UL — SIGNIFICANT CHANGE UP (ref 0–0.7)
EOSINOPHIL NFR BLD AUTO: 0.9 % — SIGNIFICANT CHANGE UP (ref 0–8)
GLUCOSE SERPL-MCNC: 184 MG/DL — HIGH (ref 70–99)
HCT VFR BLD CALC: 35.2 % — LOW (ref 37–47)
HGB BLD-MCNC: 10.4 G/DL — LOW (ref 12–16)
IMM GRANULOCYTES NFR BLD AUTO: 0.7 % — HIGH (ref 0.1–0.3)
LYMPHOCYTES # BLD AUTO: 1.91 K/UL — SIGNIFICANT CHANGE UP (ref 1.2–3.4)
LYMPHOCYTES # BLD AUTO: 21.7 % — SIGNIFICANT CHANGE UP (ref 20.5–51.1)
MCHC RBC-ENTMCNC: 22.7 PG — LOW (ref 27–31)
MCHC RBC-ENTMCNC: 29.5 G/DL — LOW (ref 32–37)
MCV RBC AUTO: 76.7 FL — LOW (ref 81–99)
MONOCYTES # BLD AUTO: 0.65 K/UL — HIGH (ref 0.1–0.6)
MONOCYTES NFR BLD AUTO: 7.4 % — SIGNIFICANT CHANGE UP (ref 1.7–9.3)
NEUTROPHILS # BLD AUTO: 6.04 K/UL — SIGNIFICANT CHANGE UP (ref 1.4–6.5)
NEUTROPHILS NFR BLD AUTO: 68.7 % — SIGNIFICANT CHANGE UP (ref 42.2–75.2)
NRBC # BLD: 0 /100 WBCS — SIGNIFICANT CHANGE UP (ref 0–0)
PLATELET # BLD AUTO: 496 K/UL — HIGH (ref 130–400)
PMV BLD: 10.5 FL — HIGH (ref 7.4–10.4)
POTASSIUM SERPL-MCNC: 4.6 MMOL/L — SIGNIFICANT CHANGE UP (ref 3.5–5)
POTASSIUM SERPL-SCNC: 4.6 MMOL/L — SIGNIFICANT CHANGE UP (ref 3.5–5)
PROT SERPL-MCNC: 7.2 G/DL — SIGNIFICANT CHANGE UP (ref 6–8)
RBC # BLD: 4.59 M/UL — SIGNIFICANT CHANGE UP (ref 4.2–5.4)
RBC # FLD: 17.2 % — HIGH (ref 11.5–14.5)
SODIUM SERPL-SCNC: 135 MMOL/L — SIGNIFICANT CHANGE UP (ref 135–146)
WBC # BLD: 8.79 K/UL — SIGNIFICANT CHANGE UP (ref 4.8–10.8)
WBC # FLD AUTO: 8.79 K/UL — SIGNIFICANT CHANGE UP (ref 4.8–10.8)

## 2024-12-11 PROCEDURE — 36000 PLACE NEEDLE IN VEIN: CPT

## 2024-12-11 PROCEDURE — 36415 COLL VENOUS BLD VENIPUNCTURE: CPT

## 2024-12-11 PROCEDURE — 85025 COMPLETE CBC W/AUTO DIFF WBC: CPT

## 2024-12-11 PROCEDURE — 99283 EMERGENCY DEPT VISIT LOW MDM: CPT | Mod: 25

## 2024-12-11 PROCEDURE — 86901 BLOOD TYPING SEROLOGIC RH(D): CPT

## 2024-12-11 PROCEDURE — 86900 BLOOD TYPING SEROLOGIC ABO: CPT

## 2024-12-11 PROCEDURE — 80053 COMPREHEN METABOLIC PANEL: CPT

## 2024-12-11 PROCEDURE — 86850 RBC ANTIBODY SCREEN: CPT

## 2024-12-11 PROCEDURE — 87507 IADNA-DNA/RNA PROBE TQ 12-25: CPT

## 2024-12-11 PROCEDURE — 99285 EMERGENCY DEPT VISIT HI MDM: CPT

## 2024-12-11 RX ORDER — 0.9 % SODIUM CHLORIDE 0.9 %
1000 INTRAVENOUS SOLUTION INTRAVENOUS ONCE
Refills: 0 | Status: COMPLETED | OUTPATIENT
Start: 2024-12-11 | End: 2024-12-11

## 2024-12-11 RX ADMIN — Medication 1000 MILLILITER(S): at 12:39

## 2024-12-11 NOTE — ED PROVIDER NOTE - PATIENT PORTAL LINK FT
You can access the FollowMyHealth Patient Portal offered by [facility] by registering at the following website: [email or web address]. By joining [facility]’s FollowMyHealth portal, you will also be able to view your health information using other applications (apps) compatible with our system.

## 2024-12-11 NOTE — ED PROVIDER NOTE - PHYSICAL EXAMINATION
Gen: NAD, AOx3, non-toxic appearing, able to ambulate without assistance  HEENT: normal conjunctiva, tongue midline, oral mucosa moist  Lung: CTAB, no respiratory distress, no wheezes/rhonchi/rales B/L, speaking in full sentences  CV: RRR, no murmurs, rubs or gallops  Abd: soft, diffuse mild TTP, ND, no guarding, no rigidity, no rebound tenderness  : BIN(+) minimal bright red blood, no clots, +internal hemorrhoid, no external hemorrhoids visualized. Exam chaperoned by AKLLIE Guajardo  Extremities: cap refill <2s  Skin: Warm, well perfused  Psych: normal affect

## 2024-12-11 NOTE — ED PROVIDER NOTE - CARE PROVIDER_API CALL
Martha Robertson  Internal Medicine  [address] [place] [id number]  Phone: [phone]  Fax: [phone]  Follow Up Time: 1-3 Days   Martha Robertson  Internal Medicine  [address] [place] [address]  Phone: [phone]  Fax: [phone]  Follow Up Time: 1-3 Days    Nely Martinez  Gastroenterology  [address] [place] [address]  Phone: [phone]  Fax: [phone]  Follow Up Time: 1-3 Days

## 2024-12-11 NOTE — ED ADULT TRIAGE NOTE - HEIGHT IN INCHES
Patient has had back pain/muscle spasms in the past that Dimas Rocha has prescribed muscle relaxers for - now for the past few days the pain/spasms have returned. Does he need to be seen for evaluation or can meds be prescribed for him? Please c/b on home #.
Spoke with pt he reports Since Thursday pain and muscle spasms in lower back. Per [facility] Medrol Dose Сергей as directed and Flexeril 10mg BID PRN #20, f/u in office if symptoms worsen or fail to improve.      D/w pt above treatment plan he expressed und
4

## 2024-12-11 NOTE — ED PROVIDER NOTE - NS ED ROS FT
CONSTITUTIONAL: No fevers, no chills, no dizziness  HEENT: no nasal congestion, no sore throat  CV: No chest pain, no palpitations  RESP: No SOB, no cough  GI: +abdominal pain, +nausea, no vomiting, +diarrhea, no constipation, +hematochezia  : No dysuria, no hematuria, no flank pain  MSK: no swelling of extremities, no back pain, no extremity pain  SKIN: No rashes  NEURO: No headache, no focal weakness, no decreased sensation/paresthesias  PSYCH: denies SI/HI

## 2024-12-11 NOTE — ED PROVIDER NOTE - PROVIDER TOKENS
PROVIDER:[TOKEN:[[id number]],FOLLOWUP:[1-3 Days]] PROVIDER:[TOKEN:[[id number]],FOLLOWUP:[1-3 Days]],PROVIDER:[TOKEN:[[id number]],FOLLOWUP:[1-3 Days]]

## 2024-12-11 NOTE — ED PROVIDER NOTE - NSFOLLOWUPINSTRUCTIONS_ED_ALL_ED_FT
You came to the ER due to Rectal Bleeding. We performed tests which showed stable blood counts. Your exam showed rectal bleeding and a possible hemorrhoid. The GI team evaluated you and recommend outpatient follow up.     Rectal bleeding is when blood passes out of the opening of the butt (anus). If you have rectal bleeding, you may see bright red blood in your underwear or in the toilet after you poop. You may also have blood mixed with your poop (stool) or dark red or black poop.    Causes of rectal bleeding include:  Diverticulosis. This is when sacs or pockets form in the large intestine (colon).  Hemorrhoids. These are blood vessels around the anus or inside the rectum that are larger than normal.  Anal fissures. This is a tear in the anus.  Proctitis and colitis. These are conditions that happen when the rectum, colon, or anus becomes inflamed.  Polyps. These are growths. They may be cancer (malignant) or not cancer (benign).  Infections of the intestines.  Fistulas. These are abnormal openings in the rectum and anus.  Rectal prolapse. This is when a part of the rectum sticks out from the anus.    Follow these instructions at home:    Take over-the-counter and prescription medicines only as told by your health care provider.  Drink enough fluid to keep your pee (urine) pale yellow.  Eat foods that are high in fiber, such as beans, whole grains, and fresh fruits and vegetables. Ask your provider if you need a fiber supplement.  Limit foods that are high in fat and processed sugars, such as fried or sweet foods.  Try not to strain when you poop.  Take a warm bath. This may help to soothe pain in your rectum.  Watch for any changes in your symptoms.    SEEK IMMEDIATE CARE (Call 911 or contact your healthcare provider) OR RETURN TO THE EMERGENCY DEPARTMENT IF: You have fever, chills, increasing amounts of rectal bleeding, you have severe pain or tenderness in your abdomen, you feel faint, you have severe pain in your rectum, you feel weak or nauseous, you cannot poop or pass gas (fart), you have new or more rectal bleeding, you have black or dark red poop, if you vomit blood or something that looks like coffee grounds in it  Do not wait to see if the symptoms will go away. Do not drive yourself to the hospital. You came to the ER due to Rectal Bleeding. We performed tests which showed stable blood counts. Your exam showed rectal bleeding and a possible hemorrhoid. The GI team evaluated you and recommend outpatient follow up.     Our Emergency Department Referral Coordinators will be reaching out to you in the next 24-48 hours from 9:00am to 5:00pm with a follow up appointment. Please expect a phone call from the hospital in that time frame. If you do not receive a call or if you have any questions or concerns, you can reach them at   [phone]    Rectal bleeding is when blood passes out of the opening of the butt (anus). If you have rectal bleeding, you may see bright red blood in your underwear or in the toilet after you poop. You may also have blood mixed with your poop (stool) or dark red or black poop.    Causes of rectal bleeding include:  Diverticulosis. This is when sacs or pockets form in the large intestine (colon).  Hemorrhoids. These are blood vessels around the anus or inside the rectum that are larger than normal.  Anal fissures. This is a tear in the anus.  Proctitis and colitis. These are conditions that happen when the rectum, colon, or anus becomes inflamed.  Polyps. These are growths. They may be cancer (malignant) or not cancer (benign).  Infections of the intestines.  Fistulas. These are abnormal openings in the rectum and anus.  Rectal prolapse. This is when a part of the rectum sticks out from the anus.    Follow these instructions at home:    Take over-the-counter and prescription medicines only as told by your health care provider.  Drink enough fluid to keep your pee (urine) pale yellow.  Eat foods that are high in fiber, such as beans, whole grains, and fresh fruits and vegetables. Ask your provider if you need a fiber supplement.  Limit foods that are high in fat and processed sugars, such as fried or sweet foods.  Try not to strain when you poop.  Take a warm bath. This may help to soothe pain in your rectum.  Watch for any changes in your symptoms.    SEEK IMMEDIATE CARE (Call 911 or contact your healthcare provider) OR RETURN TO THE EMERGENCY DEPARTMENT IF: You have fever, chills, increasing amounts of rectal bleeding, you have severe pain or tenderness in your abdomen, you feel faint, you have severe pain in your rectum, you feel weak or nauseous, you cannot poop or pass gas (fart), you have new or more rectal bleeding, you have black or dark red poop, if you vomit blood or something that looks like coffee grounds in it  Do not wait to see if the symptoms will go away. Do not drive yourself to the hospital.

## 2024-12-11 NOTE — ED PROVIDER NOTE - SECONDARY DIAGNOSIS.
Sarecycline Pregnancy And Lactation Text: This medication is Pregnancy Category D and not consider safe during pregnancy. It is also excreted in breast milk. Hemorrhoid

## 2024-12-11 NOTE — ED PROVIDER NOTE - NSTIMEPROVIDERCAREINITIATE_GEN_ER
11-Dec-2024 11:47 This was a shared visit with the [facility]. I reviewed and verified the documentation.

## 2024-12-11 NOTE — ED PROVIDER NOTE - CLINICAL SUMMARY MEDICAL DECISION MAKING FREE TEXT BOX
39-year-old female presents to the ED for concern for bloody stools.  Seen in the ED by outpatient GI with recommendations for stool sample sent.  In the emergency department had screening exam, labs and medications with reevaluation, improvement in status during ED stay.  Patient did not provide stool for sampling, will discharge with outpatient management and return precautions.

## 2024-12-11 NOTE — ED PROVIDER NOTE - OBJECTIVE STATEMENT
38 yo F PMHx DM1, GERD, chronic LBP w/ neuropathy and sciatica, anxiety, cluster B personality disorder w/ extensive psychiatric hx presenting with 1 day of blood stools with associated abdominal cramping and nausea. Patient noticed "a lot" of bright red blood in toilet and on toilet paper, no clots, this morning while having a BM. BM was not painful, was "watery and loose brown." Has not eaten new foods or tried new restaurants recently. This has never happened before per patient. Was supposed to get colonoscopy but has not been optimized due to uncontrolled glucose levels. Last menses 6 weeks ago, noted some brown vaginal discharge the past few days when wiping, denies active sexual activity.   Noted recent +Campylobacter infection 11/17 s/p abx. Denies fevers, chills, chest pain, sob, dizziness, urinary symptoms.

## 2024-12-11 NOTE — ED PROVIDER NOTE - CARE PROVIDERS DIRECT ADDRESSES
,[email or web address]

## 2024-12-11 NOTE — ED ADULT NURSE NOTE - NSFALLUNIVINTERV_ED_ALL_ED
19-Oct-2022 09:53
Bed/Stretcher in lowest position, wheels locked, appropriate side rails in place/Call bell, personal items and telephone in reach/Instruct patient to call for assistance before getting out of bed/chair/stretcher/Non-slip footwear applied when patient is off stretcher/[place] to call system/Physically safe environment - no spills, clutter or unnecessary equipment/Purposeful proactive rounding/Room/bathroom lighting operational, light cord in reach

## 2024-12-11 NOTE — ED PROVIDER NOTE - ATTENDING CONTRIBUTION TO CARE
I personally evaluated the patient. I reviewed the Resident's or Physician Assistant's note (as assigned above), and agree with the findings and plan except as documented in my note.    39-year-old female presents to the emergency department for evaluation for concern of rectal bleeding.  Patient is known to GI, GI attending met her in the ED for bedside evaluation.  No trauma injury or constitutional symptoms.    Review of systems otherwise unremarkable  GENERAL: female in no distress.   HEENT: EOMI nonicteric  CHEST: normal work of breathing noted  CV: pulses intact   EXTR: FROM  Abdomen: Soft not tender nonrigid no rebound.  Internal hemorrhoids noted on rectal exam as per resident note  NEURO: AAO 3 no focal deficits  SKIN: normal no pallor     Impression: Rectal bleeding    Plan: IV labs supportive care.  Reevaluation

## 2024-12-12 LAB — GI PCR PANEL: SIGNIFICANT CHANGE UP

## 2024-12-12 NOTE — CHART NOTE - NSCHARTNOTEFT_GEN_A_CORE
[facility] MRN [id number] / Pt already has an upcoming appt on 1/6/2025.     SPECIALTY: gastroenterology

## 2024-12-13 RX ORDER — FAMOTIDINE 40 MG/1
40 TABLET, FILM COATED ORAL
Qty: 1 | Refills: 6 | Status: ACTIVE | COMMUNITY
Start: 1900-01-01 | End: 1900-01-01

## 2024-12-13 NOTE — BH SOCIAL WORK CONFIRMATION FOLLOW UP NOTE - NSCOMMENTS_PSY_ALL_CORE
Caring contact call: Exclusion. Ania rescheduled her appointment below to 12/18:    [facility] Outpatient Mental Health Department  11-Dec-2024 13:00  [address] [address]  [phone]  Mental Health Treatment  Virtual appointment at the clinic via the hub with Dr. Veena solitariom    According to , Ania did not attend her rescheduled appointment on 12/18. SUKUMAR spoke to patient's mother who reports patient is "having stomach issues" and will call to reschedule her appointment.     Caring contact call: Eric. Ania rescheduled her appointment below to 12/18:    [facility] Outpatient Mental Health Department  11-Dec-2024 13:00  [address] [address]  [phone]  Mental Health Treatment  Virtual appointment at the clinic via the hub with Dr. Veena solitariom    According to , Ania did not attend her rescheduled appointment on 12/18. SUKUMAR spoke to patient's mother who reports patient is "having stomach issues" and will call to reschedule her appointment.   *Patient readmitted to [facility] 12/22*    Caring contact call: Exclusion.

## 2024-12-16 ENCOUNTER — APPOINTMENT (OUTPATIENT)
Dept: NUTRITION | Facility: CLINIC | Age: 39
End: 2024-12-16

## 2024-12-16 DIAGNOSIS — F32.A DEPRESSION, UNSPECIFIED: ICD-10-CM

## 2024-12-16 DIAGNOSIS — G89.29 OTHER CHRONIC PAIN: ICD-10-CM

## 2024-12-16 DIAGNOSIS — I10 ESSENTIAL (PRIMARY) HYPERTENSION: ICD-10-CM

## 2024-12-16 DIAGNOSIS — K21.9 GASTRO-ESOPHAGEAL REFLUX DISEASE WITHOUT ESOPHAGITIS: ICD-10-CM

## 2024-12-16 DIAGNOSIS — Z79.4 LONG TERM (CURRENT) USE OF INSULIN: ICD-10-CM

## 2024-12-16 DIAGNOSIS — Z88.0 ALLERGY STATUS TO PENICILLIN: ICD-10-CM

## 2024-12-16 DIAGNOSIS — E66.9 OBESITY, UNSPECIFIED: ICD-10-CM

## 2024-12-16 DIAGNOSIS — E10.40 TYPE 1 DIABETES MELLITUS WITH DIABETIC NEUROPATHY, UNSPECIFIED: ICD-10-CM

## 2024-12-16 DIAGNOSIS — F60.9 PERSONALITY DISORDER, UNSPECIFIED: ICD-10-CM

## 2024-12-16 DIAGNOSIS — M51.34 OTHER INTERVERTEBRAL DISC DEGENERATION, THORACIC REGION: ICD-10-CM

## 2024-12-16 DIAGNOSIS — Z91.51 PERSONAL HISTORY OF SUICIDAL BEHAVIOR: ICD-10-CM

## 2024-12-18 ENCOUNTER — EMERGENCY (EMERGENCY)
Facility: HOSPITAL | Age: 39
LOS: 0 days | Discharge: HOME | End: 2024-12-18
Attending: STUDENT IN AN ORGANIZED HEALTH CARE EDUCATION/TRAINING PROGRAM
Payer: MEDICAID

## 2024-12-18 ENCOUNTER — APPOINTMENT (OUTPATIENT)
Dept: PSYCHIATRY | Facility: CLINIC | Age: 39
End: 2024-12-18

## 2024-12-18 VITALS
HEIGHT: 64 IN | TEMPERATURE: 98 F | SYSTOLIC BLOOD PRESSURE: 125 MMHG | DIASTOLIC BLOOD PRESSURE: 73 MMHG | RESPIRATION RATE: 18 BRPM | HEART RATE: 90 BPM | WEIGHT: 199.96 LBS | OXYGEN SATURATION: 99 %

## 2024-12-18 DIAGNOSIS — Z88.1 ALLERGY STATUS TO OTHER ANTIBIOTIC AGENTS: ICD-10-CM

## 2024-12-18 DIAGNOSIS — Z88.0 ALLERGY STATUS TO PENICILLIN: ICD-10-CM

## 2024-12-18 DIAGNOSIS — R53.1 WEAKNESS: ICD-10-CM

## 2024-12-18 DIAGNOSIS — R19.7 DIARRHEA, UNSPECIFIED: ICD-10-CM

## 2024-12-18 DIAGNOSIS — E11.40 TYPE 2 DIABETES MELLITUS WITH DIABETIC NEUROPATHY, UNSPECIFIED: ICD-10-CM

## 2024-12-18 DIAGNOSIS — Z88.7 ALLERGY STATUS TO SERUM AND VACCINE: ICD-10-CM

## 2024-12-18 LAB
ALBUMIN SERPL ELPH-MCNC: 4.3 G/DL — SIGNIFICANT CHANGE UP (ref 3.5–5.2)
ALP SERPL-CCNC: 131 U/L — HIGH (ref 30–115)
ALT FLD-CCNC: 38 U/L — SIGNIFICANT CHANGE UP (ref 0–41)
ANION GAP SERPL CALC-SCNC: 13 MMOL/L — SIGNIFICANT CHANGE UP (ref 7–14)
APPEARANCE UR: CLEAR — SIGNIFICANT CHANGE UP
AST SERPL-CCNC: 23 U/L — SIGNIFICANT CHANGE UP (ref 0–41)
BASOPHILS # BLD AUTO: 0.06 K/UL — SIGNIFICANT CHANGE UP (ref 0–0.2)
BASOPHILS NFR BLD AUTO: 0.6 % — SIGNIFICANT CHANGE UP (ref 0–1)
BILIRUB SERPL-MCNC: <0.2 MG/DL — SIGNIFICANT CHANGE UP (ref 0.2–1.2)
BILIRUB UR-MCNC: NEGATIVE — SIGNIFICANT CHANGE UP
BUN SERPL-MCNC: 7 MG/DL — LOW (ref 10–20)
CALCIUM SERPL-MCNC: 9.6 MG/DL — SIGNIFICANT CHANGE UP (ref 8.4–10.5)
CHLORIDE SERPL-SCNC: 102 MMOL/L — SIGNIFICANT CHANGE UP (ref 98–110)
CO2 SERPL-SCNC: 24 MMOL/L — SIGNIFICANT CHANGE UP (ref 17–32)
COLOR SPEC: YELLOW — SIGNIFICANT CHANGE UP
CREAT SERPL-MCNC: 0.5 MG/DL — LOW (ref 0.7–1.5)
DIFF PNL FLD: NEGATIVE — SIGNIFICANT CHANGE UP
EGFR: 122 ML/MIN/1.73M2 — SIGNIFICANT CHANGE UP
EOSINOPHIL # BLD AUTO: 0.21 K/UL — SIGNIFICANT CHANGE UP (ref 0–0.7)
EOSINOPHIL NFR BLD AUTO: 2.1 % — SIGNIFICANT CHANGE UP (ref 0–8)
GLUCOSE SERPL-MCNC: 97 MG/DL — SIGNIFICANT CHANGE UP (ref 70–99)
GLUCOSE UR QL: NEGATIVE MG/DL — SIGNIFICANT CHANGE UP
HCG SERPL QL: NEGATIVE — SIGNIFICANT CHANGE UP
HCT VFR BLD CALC: 35.4 % — LOW (ref 37–47)
HGB BLD-MCNC: 10.6 G/DL — LOW (ref 12–16)
IMM GRANULOCYTES NFR BLD AUTO: 0.4 % — HIGH (ref 0.1–0.3)
KETONES UR-MCNC: ABNORMAL MG/DL
LEUKOCYTE ESTERASE UR-ACNC: NEGATIVE — SIGNIFICANT CHANGE UP
LIDOCAIN IGE QN: 13 U/L — SIGNIFICANT CHANGE UP (ref 7–60)
LYMPHOCYTES # BLD AUTO: 2.89 K/UL — SIGNIFICANT CHANGE UP (ref 1.2–3.4)
LYMPHOCYTES # BLD AUTO: 28.4 % — SIGNIFICANT CHANGE UP (ref 20.5–51.1)
MAGNESIUM SERPL-MCNC: 1.7 MG/DL — LOW (ref 1.8–2.4)
MCHC RBC-ENTMCNC: 22.7 PG — LOW (ref 27–31)
MCHC RBC-ENTMCNC: 29.9 G/DL — LOW (ref 32–37)
MCV RBC AUTO: 76 FL — LOW (ref 81–99)
MONOCYTES # BLD AUTO: 0.8 K/UL — HIGH (ref 0.1–0.6)
MONOCYTES NFR BLD AUTO: 7.9 % — SIGNIFICANT CHANGE UP (ref 1.7–9.3)
NEUTROPHILS # BLD AUTO: 6.19 K/UL — SIGNIFICANT CHANGE UP (ref 1.4–6.5)
NEUTROPHILS NFR BLD AUTO: 60.6 % — SIGNIFICANT CHANGE UP (ref 42.2–75.2)
NITRITE UR-MCNC: NEGATIVE — SIGNIFICANT CHANGE UP
NRBC # BLD: 0 /100 WBCS — SIGNIFICANT CHANGE UP (ref 0–0)
PH UR: 5.5 — SIGNIFICANT CHANGE UP (ref 5–8)
PLATELET # BLD AUTO: 572 K/UL — HIGH (ref 130–400)
PMV BLD: 10.1 FL — SIGNIFICANT CHANGE UP (ref 7.4–10.4)
POTASSIUM SERPL-MCNC: 3.8 MMOL/L — SIGNIFICANT CHANGE UP (ref 3.5–5)
POTASSIUM SERPL-SCNC: 3.8 MMOL/L — SIGNIFICANT CHANGE UP (ref 3.5–5)
PROT SERPL-MCNC: 7.3 G/DL — SIGNIFICANT CHANGE UP (ref 6–8)
PROT UR-MCNC: SIGNIFICANT CHANGE UP MG/DL
RBC # BLD: 4.66 M/UL — SIGNIFICANT CHANGE UP (ref 4.2–5.4)
RBC # FLD: 17.3 % — HIGH (ref 11.5–14.5)
SODIUM SERPL-SCNC: 139 MMOL/L — SIGNIFICANT CHANGE UP (ref 135–146)
SP GR SPEC: 1.02 — SIGNIFICANT CHANGE UP (ref 1–1.03)
UROBILINOGEN FLD QL: 0.2 MG/DL — SIGNIFICANT CHANGE UP (ref 0.2–1)
WBC # BLD: 10.19 K/UL — SIGNIFICANT CHANGE UP (ref 4.8–10.8)
WBC # FLD AUTO: 10.19 K/UL — SIGNIFICANT CHANGE UP (ref 4.8–10.8)

## 2024-12-18 PROCEDURE — 71045 X-RAY EXAM CHEST 1 VIEW: CPT | Mod: 26

## 2024-12-18 PROCEDURE — 84703 CHORIONIC GONADOTROPIN ASSAY: CPT

## 2024-12-18 PROCEDURE — 36415 COLL VENOUS BLD VENIPUNCTURE: CPT

## 2024-12-18 PROCEDURE — 85025 COMPLETE CBC W/AUTO DIFF WBC: CPT

## 2024-12-18 PROCEDURE — 81003 URINALYSIS AUTO W/O SCOPE: CPT

## 2024-12-18 PROCEDURE — 83735 ASSAY OF MAGNESIUM: CPT

## 2024-12-18 PROCEDURE — 99285 EMERGENCY DEPT VISIT HI MDM: CPT | Mod: 25

## 2024-12-18 PROCEDURE — 83690 ASSAY OF LIPASE: CPT

## 2024-12-18 PROCEDURE — 36000 PLACE NEEDLE IN VEIN: CPT

## 2024-12-18 PROCEDURE — 93005 ELECTROCARDIOGRAM TRACING: CPT

## 2024-12-18 PROCEDURE — 93010 ELECTROCARDIOGRAM REPORT: CPT

## 2024-12-18 PROCEDURE — 71045 X-RAY EXAM CHEST 1 VIEW: CPT

## 2024-12-18 PROCEDURE — 80053 COMPREHEN METABOLIC PANEL: CPT

## 2024-12-18 PROCEDURE — 99284 EMERGENCY DEPT VISIT MOD MDM: CPT

## 2024-12-18 RX ORDER — SODIUM CHLORIDE 9 MG/ML
1000 INJECTION, SOLUTION INTRAMUSCULAR; INTRAVENOUS; SUBCUTANEOUS ONCE
Refills: 0 | Status: COMPLETED | OUTPATIENT
Start: 2024-12-18 | End: 2024-12-18

## 2024-12-18 RX ADMIN — SODIUM CHLORIDE 1000 MILLILITER(S): 9 INJECTION, SOLUTION INTRAMUSCULAR; INTRAVENOUS; SUBCUTANEOUS at 21:15

## 2024-12-18 RX ADMIN — Medication 400 MILLIGRAM(S): at 22:11

## 2024-12-18 NOTE — ED PROVIDER NOTE - NSFOLLOWUPINSTRUCTIONS_ED_ALL_ED_FT
Follow up with PMD and GI.    Abdominal Pain    Many things can cause abdominal pain. Usually, abdominal pain is not caused by a disease and will improve without treatment. Your health care provider will do a physical exam and possibly order blood tests and imaging to help determine the seriousness of your pain. However, in many cases, no cause may be found and you may need further testing as an outpatient. Monitor your abdominal pain for any changes.     SEEK IMMEDIATE MEDICAL CARE IF YOU HAVE THE FOLLOWING SYMPTOMS: worsening abdominal pain, vomiting, diarrhea, inability to have bowel movements or pass gas, black or bloody stool, fever accompanying chest pain or back pain, or dizziness/lightheadedness.

## 2024-12-18 NOTE — ED PROVIDER NOTE - CLINICAL SUMMARY MEDICAL DECISION MAKING FREE TEXT BOX
Patient presented today for evaluation of diarrhea.  Patient noted to have no significant acute abnormalities except for hypomagnesemia.  Patient was treated.  EKG as interpreted by myself, Dr. Guzmán noted to have normal sinus rhythm with no ST segment changes.  Patient was discharged to close follow-up outpatient with PMD.  Return precautions explained to patient.

## 2024-12-18 NOTE — ED PROVIDER NOTE - PATIENT PORTAL LINK FT
You can access the FollowMyHealth Patient Portal offered by [facility] by registering at the following website: [email or web address]. By joining [employer]’s FollowMyHealth portal, you will also be able to view your health information using other applications (apps) compatible with our system.

## 2024-12-18 NOTE — ED ADULT NURSE NOTE - CHIEF COMPLAINT QUOTE
BIBA from home c/o abdominal pain associated with diarrhea and generalized weakness  and mid sternal chest pain non radiating x1

## 2024-12-18 NOTE — ED ADULT TRIAGE NOTE - CHIEF COMPLAINT QUOTE
BIBA from home c/o diarrhea BIBA from home c/o abdominal pain associated with diarrhea and generalized weakness  and mid sternal chest pain non radiating x1

## 2024-12-18 NOTE — ED PROVIDER NOTE - PHYSICAL EXAMINATION
CONST: Well appearing in NAD  EYES: Sclera and conjunctiva clear.   ENT: No nasal discharge. Oropharynx normal appearing  NECK: Non-tender, no meningeal signs. normal ROM. supple   CARD: S1 S2; No mrg  RESP: Equal BS B/L, No wheezes, rhonchi or rales. No distress  GI: Soft, non-tender, non-distended. no cva tenderness. normal BS  MS: Normal ROM in all extremities. pulses 2 +. no calf tenderness or swelling  SKIN: Warm, dry, no acute rashes. Good turgor

## 2024-12-18 NOTE — ED PROVIDER NOTE - OBJECTIVE STATEMENT
39-year-old female past medical history of diabetes, GERD, neuropathy, sciatica, anxiety presents for evaluation.  Patient endorses multiple episodes of diarrhea over the past 2 days, no inciting or relieving factors.  Associated cough and generalized weakness.  Denies fever, headache, chest pain, dysuria, hematuria, vomiting.

## 2024-12-19 ENCOUNTER — EMERGENCY (EMERGENCY)
Facility: HOSPITAL | Age: 39
LOS: 0 days | Discharge: HOME | End: 2024-12-19
Attending: EMERGENCY MEDICINE
Payer: MEDICAID

## 2024-12-19 VITALS
DIASTOLIC BLOOD PRESSURE: 77 MMHG | RESPIRATION RATE: 16 BRPM | OXYGEN SATURATION: 100 % | HEIGHT: 64 IN | HEART RATE: 87 BPM | TEMPERATURE: 98 F | SYSTOLIC BLOOD PRESSURE: 124 MMHG

## 2024-12-19 DIAGNOSIS — Z88.7 ALLERGY STATUS TO SERUM AND VACCINE: ICD-10-CM

## 2024-12-19 DIAGNOSIS — R51.9 HEADACHE, UNSPECIFIED: ICD-10-CM

## 2024-12-19 DIAGNOSIS — Z88.8 ALLERGY STATUS TO OTHER DRUGS, MEDICAMENTS AND BIOLOGICAL SUBSTANCES: ICD-10-CM

## 2024-12-19 DIAGNOSIS — Z88.1 ALLERGY STATUS TO OTHER ANTIBIOTIC AGENTS: ICD-10-CM

## 2024-12-19 DIAGNOSIS — E11.9 TYPE 2 DIABETES MELLITUS WITHOUT COMPLICATIONS: ICD-10-CM

## 2024-12-19 DIAGNOSIS — K21.9 GASTRO-ESOPHAGEAL REFLUX DISEASE WITHOUT ESOPHAGITIS: ICD-10-CM

## 2024-12-19 DIAGNOSIS — Z86.69 PERSONAL HISTORY OF OTHER DISEASES OF THE NERVOUS SYSTEM AND SENSE ORGANS: ICD-10-CM

## 2024-12-19 DIAGNOSIS — G44.89 OTHER HEADACHE SYNDROME: ICD-10-CM

## 2024-12-19 DIAGNOSIS — Z88.0 ALLERGY STATUS TO PENICILLIN: ICD-10-CM

## 2024-12-19 DIAGNOSIS — F32.A DEPRESSION, UNSPECIFIED: ICD-10-CM

## 2024-12-19 LAB
ALBUMIN SERPL ELPH-MCNC: 4.4 G/DL — SIGNIFICANT CHANGE UP (ref 3.5–5.2)
ALP SERPL-CCNC: 133 U/L — HIGH (ref 30–115)
ALT FLD-CCNC: 37 U/L — SIGNIFICANT CHANGE UP (ref 0–41)
ANION GAP SERPL CALC-SCNC: 10 MMOL/L — SIGNIFICANT CHANGE UP (ref 7–14)
APPEARANCE UR: CLEAR — SIGNIFICANT CHANGE UP
AST SERPL-CCNC: 23 U/L — SIGNIFICANT CHANGE UP (ref 0–41)
BASOPHILS # BLD AUTO: 0.05 K/UL — SIGNIFICANT CHANGE UP (ref 0–0.2)
BASOPHILS NFR BLD AUTO: 0.6 % — SIGNIFICANT CHANGE UP (ref 0–1)
BILIRUB SERPL-MCNC: <0.2 MG/DL — SIGNIFICANT CHANGE UP (ref 0.2–1.2)
BILIRUB UR-MCNC: NEGATIVE — SIGNIFICANT CHANGE UP
BUN SERPL-MCNC: 8 MG/DL — LOW (ref 10–20)
CALCIUM SERPL-MCNC: 9.3 MG/DL — SIGNIFICANT CHANGE UP (ref 8.4–10.5)
CHLORIDE SERPL-SCNC: 101 MMOL/L — SIGNIFICANT CHANGE UP (ref 98–110)
CO2 SERPL-SCNC: 25 MMOL/L — SIGNIFICANT CHANGE UP (ref 17–32)
COLOR SPEC: YELLOW — SIGNIFICANT CHANGE UP
CREAT SERPL-MCNC: 0.6 MG/DL — LOW (ref 0.7–1.5)
DIFF PNL FLD: NEGATIVE — SIGNIFICANT CHANGE UP
EGFR: 117 ML/MIN/1.73M2 — SIGNIFICANT CHANGE UP
EOSINOPHIL # BLD AUTO: 0.12 K/UL — SIGNIFICANT CHANGE UP (ref 0–0.7)
EOSINOPHIL NFR BLD AUTO: 1.4 % — SIGNIFICANT CHANGE UP (ref 0–8)
GLUCOSE SERPL-MCNC: 192 MG/DL — HIGH (ref 70–99)
GLUCOSE UR QL: >=1000 MG/DL
HCT VFR BLD CALC: 34.2 % — LOW (ref 37–47)
HGB BLD-MCNC: 10 G/DL — LOW (ref 12–16)
IMM GRANULOCYTES NFR BLD AUTO: 0.5 % — HIGH (ref 0.1–0.3)
KETONES UR-MCNC: NEGATIVE MG/DL — SIGNIFICANT CHANGE UP
LEUKOCYTE ESTERASE UR-ACNC: NEGATIVE — SIGNIFICANT CHANGE UP
LYMPHOCYTES # BLD AUTO: 1.9 K/UL — SIGNIFICANT CHANGE UP (ref 1.2–3.4)
LYMPHOCYTES # BLD AUTO: 22 % — SIGNIFICANT CHANGE UP (ref 20.5–51.1)
MAGNESIUM SERPL-MCNC: 1.9 MG/DL — SIGNIFICANT CHANGE UP (ref 1.8–2.4)
MCHC RBC-ENTMCNC: 22.5 PG — LOW (ref 27–31)
MCHC RBC-ENTMCNC: 29.2 G/DL — LOW (ref 32–37)
MCV RBC AUTO: 76.9 FL — LOW (ref 81–99)
MONOCYTES # BLD AUTO: 0.66 K/UL — HIGH (ref 0.1–0.6)
MONOCYTES NFR BLD AUTO: 7.6 % — SIGNIFICANT CHANGE UP (ref 1.7–9.3)
NEUTROPHILS # BLD AUTO: 5.86 K/UL — SIGNIFICANT CHANGE UP (ref 1.4–6.5)
NEUTROPHILS NFR BLD AUTO: 67.9 % — SIGNIFICANT CHANGE UP (ref 42.2–75.2)
NITRITE UR-MCNC: NEGATIVE — SIGNIFICANT CHANGE UP
NRBC # BLD: 0 /100 WBCS — SIGNIFICANT CHANGE UP (ref 0–0)
PH UR: 5.5 — SIGNIFICANT CHANGE UP (ref 5–8)
PHOSPHATE SERPL-MCNC: 3.3 MG/DL — SIGNIFICANT CHANGE UP (ref 2.1–4.9)
PLATELET # BLD AUTO: 565 K/UL — HIGH (ref 130–400)
PMV BLD: 10.4 FL — SIGNIFICANT CHANGE UP (ref 7.4–10.4)
POTASSIUM SERPL-MCNC: 4.2 MMOL/L — SIGNIFICANT CHANGE UP (ref 3.5–5)
POTASSIUM SERPL-SCNC: 4.2 MMOL/L — SIGNIFICANT CHANGE UP (ref 3.5–5)
PROT SERPL-MCNC: 7 G/DL — SIGNIFICANT CHANGE UP (ref 6–8)
PROT UR-MCNC: SIGNIFICANT CHANGE UP MG/DL
RBC # BLD: 4.45 M/UL — SIGNIFICANT CHANGE UP (ref 4.2–5.4)
RBC # FLD: 17.2 % — HIGH (ref 11.5–14.5)
SODIUM SERPL-SCNC: 136 MMOL/L — SIGNIFICANT CHANGE UP (ref 135–146)
SP GR SPEC: >1.03 — HIGH (ref 1–1.03)
UROBILINOGEN FLD QL: 0.2 MG/DL — SIGNIFICANT CHANGE UP (ref 0.2–1)
WBC # BLD: 8.63 K/UL — SIGNIFICANT CHANGE UP (ref 4.8–10.8)
WBC # FLD AUTO: 8.63 K/UL — SIGNIFICANT CHANGE UP (ref 4.8–10.8)

## 2024-12-19 PROCEDURE — 93005 ELECTROCARDIOGRAM TRACING: CPT

## 2024-12-19 PROCEDURE — 36000 PLACE NEEDLE IN VEIN: CPT

## 2024-12-19 PROCEDURE — 36415 COLL VENOUS BLD VENIPUNCTURE: CPT

## 2024-12-19 PROCEDURE — 93010 ELECTROCARDIOGRAM REPORT: CPT

## 2024-12-19 PROCEDURE — 70450 CT HEAD/BRAIN W/O DYE: CPT | Mod: 26,MC

## 2024-12-19 PROCEDURE — 85025 COMPLETE CBC W/AUTO DIFF WBC: CPT

## 2024-12-19 PROCEDURE — 83735 ASSAY OF MAGNESIUM: CPT

## 2024-12-19 PROCEDURE — 81003 URINALYSIS AUTO W/O SCOPE: CPT

## 2024-12-19 PROCEDURE — 99285 EMERGENCY DEPT VISIT HI MDM: CPT | Mod: 25

## 2024-12-19 PROCEDURE — 99285 EMERGENCY DEPT VISIT HI MDM: CPT

## 2024-12-19 PROCEDURE — 70450 CT HEAD/BRAIN W/O DYE: CPT | Mod: MC

## 2024-12-19 PROCEDURE — 84100 ASSAY OF PHOSPHORUS: CPT

## 2024-12-19 PROCEDURE — 80053 COMPREHEN METABOLIC PANEL: CPT

## 2024-12-19 RX ORDER — 0.9 % SODIUM CHLORIDE 0.9 %
1000 INTRAVENOUS SOLUTION INTRAVENOUS ONCE
Refills: 0 | Status: DISCONTINUED | OUTPATIENT
Start: 2024-12-19 | End: 2024-12-19

## 2024-12-19 RX ORDER — METOCLOPRAMIDE HYDROCHLORIDE 10 MG/1
10 TABLET ORAL ONCE
Refills: 0 | Status: DISCONTINUED | OUTPATIENT
Start: 2024-12-19 | End: 2024-12-19

## 2024-12-19 RX ORDER — ACETAMINOPHEN 500MG 500 MG/1
975 TABLET, COATED ORAL ONCE
Refills: 0 | Status: DISCONTINUED | OUTPATIENT
Start: 2024-12-19 | End: 2024-12-19

## 2024-12-19 NOTE — ED PROVIDER NOTE - PATIENT PORTAL LINK FT
You can access the FollowMyHealth Patient Portal offered by [facility] by registering at the following website: [email or web address]. By joining [employer]s FollowMyHealth portal, you will also be able to view your health information using other applications (apps) compatible with our system.

## 2024-12-19 NOTE — CHART NOTE - NSCHARTNOTEFT_GEN_A_CORE
[facility] MRN [id number] / Pt already has an upcoming gastro appt on 1/6/2025 at [address] [facility] 12/19 - JL    SPECIALTY: gastroenterology

## 2024-12-19 NOTE — ED PROVIDER NOTE - NSFOLLOWUPINSTRUCTIONS_ED_ALL_ED_FT
1. Continue your medications as prescribed  2. Return to the ED for persistent or worsening symptoms  3. Follow up with your primary physician within the next week or so for reassessment    Headache    A headache is pain or discomfort felt around the head or neck area. The specific cause of a headache may not be found as there are many types including tension headaches, migraine headaches, and cluster headaches. Watch your condition for any changes. Things you can do to manage your pain include taking over the counter and prescription medications as instructed by your health care provider, lying down in a dark quiet room, limiting stress, getting regular sleep, and refraining from alcohol and tobacco products.    SEEK IMMEDIATE MEDICAL CARE IF YOU HAVE ANY OF THE FOLLOWING SYMPTOMS: fever, vomiting, stiff neck, loss of vision, problems with speech, muscle weakness, loss of balance, trouble walking, passing out, or confusion.

## 2024-12-19 NOTE — ED PROVIDER NOTE - OBJECTIVE STATEMENT
Patient is a 39 year old female with PMH of chronic migraines, GERD, DDD, Depression, and DM presenting for a headache. Patient endorses a dull, tension-like headache x3 days. Patient states her headache feels similar to prior episodes of migraines. Patient states she was recently taken off two migraine medications (Nurtec and Aimovig) and believes that is why her headache reoccurred. Patient is pending a prescription refill to be restarted on the medications. Patient states the reason she is presenting to the ER today is because the staff at [facility] noted her blood pressure and heart rate to be elevated (normal in the ER). Patient denies any acute complaints; denies dizziness, chest pain, shortness of breath, nausea, vomiting, abdominal pain, urinary symptoms, cough, congestion, or fevers. LMP: ended last week.

## 2024-12-19 NOTE — ED PROVIDER NOTE - PHYSICAL EXAMINATION
VITAL SIGNS: I have reviewed nursing notes and confirm.  CONSTITUTIONAL: Well-appearing, non-toxic, in NAD  SKIN: Warm dry, normal skin turgor  HEAD: NCAT  EYES: No conjunctival injection, scleral anicteric; EOMI, PERRLA; no nystagmus noted   ENT: Moist mucous membranes, normal pharynx with no erythema or exudates  NECK: Supple; full ROM. Nontender. No cervical LAD  CARD: RRR, no murmurs, rubs or gallops  RESP: Clear to ausculation bilaterally.  No rales, rhonchi, or wheezing.  ABD: Soft, non-distended, non-tender, no rebound or guarding. No CVA tenderness  EXT: Full ROM, no bony tenderness, no pedal edema, no calf tenderness  NEURO: Normal motor, normal sensory. CN II-XII grossly intact. Cerebellar testing normal. Normal gait.

## 2024-12-19 NOTE — ED PROVIDER NOTE - CLINICAL SUMMARY MEDICAL DECISION MAKING FREE TEXT BOX
38yo woman multiple medical problems, migraine HA presents with persistent frontal HA after her medication for her migraines was changed. She has been on a preventive and an abortive medication for some time, but it was d/cd after a recent hospitalization so she has not had it for the last few days. She did call her neurologist yesterday and meds were restarted. She was sent to the ED because the assisted living facility where she lives took her bp and hr earlier and found them "high." Pt says 140s/89s and . On exam she is nontoxic appearing. AYAD, EOMI on my exam. Lungs CTA, CVS1S2 RRR abd soft, neuro intact. Head CT and labs reassuring; says she wouldn't have come just for the HA but the nurse at the facility insisted. Pt just with mild HA now, comfortable with discharge.

## 2024-12-19 NOTE — ED PROVIDER NOTE - CHIEF COMPLAINT
[facility] 
 chronic vega Data:  
Labs: 
Recent Labs 19 
[id number] 19 
0459 19 
0075 
8365 19 
0459 19 
[id number]  Bianca Bhatia MD 
 
 The patient is a 39y Female complaining of headache.

## 2024-12-20 ENCOUNTER — OUTPATIENT (OUTPATIENT)
Dept: OUTPATIENT SERVICES | Facility: HOSPITAL | Age: 39
LOS: 1 days | End: 2024-12-20
Payer: MEDICAID

## 2024-12-20 ENCOUNTER — APPOINTMENT (OUTPATIENT)
Dept: PSYCHIATRY | Facility: CLINIC | Age: 39
End: 2024-12-20
Payer: MEDICAID

## 2024-12-20 DIAGNOSIS — F32.A DEPRESSION, UNSPECIFIED: ICD-10-CM

## 2024-12-20 DIAGNOSIS — F41.1 GENERALIZED ANXIETY DISORDER: ICD-10-CM

## 2024-12-20 DIAGNOSIS — F41.0 GENERALIZED ANXIETY DISORDER: ICD-10-CM

## 2024-12-20 DIAGNOSIS — F60.3 BORDERLINE PERSONALITY DISORDER: ICD-10-CM

## 2024-12-20 DIAGNOSIS — F41.0 PANIC DISORDER [EPISODIC PAROXYSMAL ANXIETY]: ICD-10-CM

## 2024-12-20 PROCEDURE — 99214 OFFICE O/P EST MOD 30 MIN: CPT | Mod: 95

## 2024-12-20 PROCEDURE — 90832 PSYTX W PT 30 MINUTES: CPT

## 2024-12-21 ENCOUNTER — INPATIENT (INPATIENT)
Facility: HOSPITAL | Age: 39
LOS: 9 days | Discharge: HOME | DRG: 751 | End: 2024-12-31
Attending: PSYCHIATRY & NEUROLOGY | Admitting: PSYCHIATRY & NEUROLOGY
Payer: MEDICAID

## 2024-12-21 VITALS
OXYGEN SATURATION: 99 % | RESPIRATION RATE: 16 BRPM | SYSTOLIC BLOOD PRESSURE: 145 MMHG | HEART RATE: 95 BPM | TEMPERATURE: 98 F | WEIGHT: 190.04 LBS | DIASTOLIC BLOOD PRESSURE: 86 MMHG | HEIGHT: 64 IN

## 2024-12-21 DIAGNOSIS — F32.A DEPRESSION, UNSPECIFIED: ICD-10-CM

## 2024-12-21 DIAGNOSIS — F60.3 BORDERLINE PERSONALITY DISORDER: ICD-10-CM

## 2024-12-21 DIAGNOSIS — F41.1 GENERALIZED ANXIETY DISORDER: ICD-10-CM

## 2024-12-21 DIAGNOSIS — F41.0 PANIC DISORDER [EPISODIC PAROXYSMAL ANXIETY]: ICD-10-CM

## 2024-12-21 LAB
ALBUMIN SERPL ELPH-MCNC: 4.2 G/DL — SIGNIFICANT CHANGE UP (ref 3.5–5.2)
ALP SERPL-CCNC: 145 U/L — HIGH (ref 30–115)
ALT FLD-CCNC: 48 U/L — HIGH (ref 0–41)
ANION GAP SERPL CALC-SCNC: 11 MMOL/L — SIGNIFICANT CHANGE UP (ref 7–14)
APAP SERPL-MCNC: <5 UG/ML — LOW (ref 10–30)
AST SERPL-CCNC: 33 U/L — SIGNIFICANT CHANGE UP (ref 0–41)
BASOPHILS # BLD AUTO: 0.06 K/UL — SIGNIFICANT CHANGE UP (ref 0–0.2)
BASOPHILS NFR BLD AUTO: 0.6 % — SIGNIFICANT CHANGE UP (ref 0–1)
BILIRUB SERPL-MCNC: <0.2 MG/DL — SIGNIFICANT CHANGE UP (ref 0.2–1.2)
BUN SERPL-MCNC: 8 MG/DL — LOW (ref 10–20)
CALCIUM SERPL-MCNC: 9.8 MG/DL — SIGNIFICANT CHANGE UP (ref 8.4–10.5)
CHLORIDE SERPL-SCNC: 104 MMOL/L — SIGNIFICANT CHANGE UP (ref 98–110)
CO2 SERPL-SCNC: 26 MMOL/L — SIGNIFICANT CHANGE UP (ref 17–32)
CREAT SERPL-MCNC: 0.5 MG/DL — LOW (ref 0.7–1.5)
EGFR: 122 ML/MIN/1.73M2 — SIGNIFICANT CHANGE UP
EOSINOPHIL # BLD AUTO: 0.16 K/UL — SIGNIFICANT CHANGE UP (ref 0–0.7)
EOSINOPHIL NFR BLD AUTO: 1.5 % — SIGNIFICANT CHANGE UP (ref 0–8)
ETHANOL SERPL-MCNC: <10 MG/DL — SIGNIFICANT CHANGE UP
GLUCOSE SERPL-MCNC: 52 MG/DL — CRITICAL LOW (ref 70–99)
HCG SERPL QL: NEGATIVE — SIGNIFICANT CHANGE UP
HCT VFR BLD CALC: 32.7 % — LOW (ref 37–47)
HGB BLD-MCNC: 9.8 G/DL — LOW (ref 12–16)
IMM GRANULOCYTES NFR BLD AUTO: 0.5 % — HIGH (ref 0.1–0.3)
LIDOCAIN IGE QN: 13 U/L — SIGNIFICANT CHANGE UP (ref 7–60)
LYMPHOCYTES # BLD AUTO: 3.37 K/UL — SIGNIFICANT CHANGE UP (ref 1.2–3.4)
LYMPHOCYTES # BLD AUTO: 30.9 % — SIGNIFICANT CHANGE UP (ref 20.5–51.1)
MCHC RBC-ENTMCNC: 22.5 PG — LOW (ref 27–31)
MCHC RBC-ENTMCNC: 30 G/DL — LOW (ref 32–37)
MCV RBC AUTO: 75 FL — LOW (ref 81–99)
MONOCYTES # BLD AUTO: 0.84 K/UL — HIGH (ref 0.1–0.6)
MONOCYTES NFR BLD AUTO: 7.7 % — SIGNIFICANT CHANGE UP (ref 1.7–9.3)
NEUTROPHILS # BLD AUTO: 6.41 K/UL — SIGNIFICANT CHANGE UP (ref 1.4–6.5)
NEUTROPHILS NFR BLD AUTO: 58.8 % — SIGNIFICANT CHANGE UP (ref 42.2–75.2)
NRBC # BLD: 0 /100 WBCS — SIGNIFICANT CHANGE UP (ref 0–0)
PLATELET # BLD AUTO: 599 K/UL — HIGH (ref 130–400)
PMV BLD: 9.7 FL — SIGNIFICANT CHANGE UP (ref 7.4–10.4)
POTASSIUM SERPL-MCNC: 3.8 MMOL/L — SIGNIFICANT CHANGE UP (ref 3.5–5)
POTASSIUM SERPL-SCNC: 3.8 MMOL/L — SIGNIFICANT CHANGE UP (ref 3.5–5)
PROT SERPL-MCNC: 7.5 G/DL — SIGNIFICANT CHANGE UP (ref 6–8)
RBC # BLD: 4.36 M/UL — SIGNIFICANT CHANGE UP (ref 4.2–5.4)
RBC # FLD: 17 % — HIGH (ref 11.5–14.5)
SALICYLATES SERPL-MCNC: <0.3 MG/DL — LOW (ref 4–30)
SARS-COV-2 RNA SPEC QL NAA+PROBE: SIGNIFICANT CHANGE UP
SODIUM SERPL-SCNC: 141 MMOL/L — SIGNIFICANT CHANGE UP (ref 135–146)
WBC # BLD: 10.89 K/UL — HIGH (ref 4.8–10.8)
WBC # FLD AUTO: 10.89 K/UL — HIGH (ref 4.8–10.8)

## 2024-12-21 PROCEDURE — 71045 X-RAY EXAM CHEST 1 VIEW: CPT | Mod: 26

## 2024-12-21 PROCEDURE — 99285 EMERGENCY DEPT VISIT HI MDM: CPT

## 2024-12-21 RX ORDER — ONDANSETRON 4 MG/1
4 TABLET ORAL ONCE
Refills: 0 | Status: COMPLETED | OUTPATIENT
Start: 2024-12-21 | End: 2024-12-21

## 2024-12-21 RX ORDER — DEXTROSE MONOHYDRATE 25 G/50ML
50 INJECTION, SOLUTION INTRAVENOUS ONCE
Refills: 0 | Status: COMPLETED | OUTPATIENT
Start: 2024-12-21 | End: 2024-12-21

## 2024-12-21 RX ORDER — SODIUM CHLORIDE 9 MG/ML
1000 INJECTION, SOLUTION INTRAVENOUS ONCE
Refills: 0 | Status: COMPLETED | OUTPATIENT
Start: 2024-12-21 | End: 2024-12-21

## 2024-12-21 RX ADMIN — ONDANSETRON 4 MILLIGRAM(S): 4 TABLET ORAL at 20:59

## 2024-12-21 RX ADMIN — SODIUM CHLORIDE 1000 MILLILITER(S): 9 INJECTION, SOLUTION INTRAVENOUS at 20:59

## 2024-12-21 RX ADMIN — DEXTROSE MONOHYDRATE 50 MILLILITER(S): 25 INJECTION, SOLUTION INTRAVENOUS at 22:01

## 2024-12-21 NOTE — ED PROVIDER NOTE - CLINICAL SUMMARY MEDICAL DECISION MAKING FREE TEXT BOX
38 yo F presented to ED with suicidal ideation. Also reporting vomiting, abdominal pain and chest pain. Labs and EKG were ordered and reviewed. EKG nonischemic. Troponin stable.  Imaging was ordered and reviewed by me. No signs of acute intra-abdominal pathology. Appropriate medications for patient's presenting complaints were ordered and effects were reassessed.  Patient's records (prior hospital, ED visit, and/or nursing home notes if available) were reviewed.  Additional history was obtained from EMS, family, and/or PCP (where available).  Escalation to admission/observation was considered. Patient requires inpatient hospitalization - involuntary psychiatric admission.

## 2024-12-21 NOTE — ED PROVIDER NOTE - CARE PLAN
Assessment and plan of treatment:	Plan - labs covid ekg ct cxr psych reassess   Principal Discharge DX:	Suicidal ideation  Assessment and plan of treatment:	Plan - labs covid ekg ct cxr psych reassess   1

## 2024-12-21 NOTE — ED PROVIDER NOTE - OBJECTIVE STATEMENT
39-year-old F with PMH borderline personality disorder, depression, DM, chronic migraines, GERD, presents to the ED with suicidal thoughts which she has had for the last couple days as well as multiple episodes of emesis earlier today.  Patient states that she vomited multiple times today, did not take any substances.  Denies diarrhea, last bowel movement was earlier today.  Also endorsing centralized chest pain that is been intermittent for the last few days.  Denies radiation to either arm or to the jaw.  Denies fever, chills.  Denies shortness of breath.    States that she has been intermittently having suicidal ideation as an feelings of not wanting to be here anymore.  Denies plan, denies homicidal thoughts.  States that she got in an argument today with her mom after her mom and her boyfriend began to argue in front of her.  States that she has had a really difficult time getting over the death of her father which happened 2 years ago.

## 2024-12-21 NOTE — ED ADULT NURSE NOTE - OBJECTIVE STATEMENT
Pt presents to the ED complaining of nausea and suicidal thoughts. Pt denies any plan, denies homicidal ideation.

## 2024-12-21 NOTE — ED PROVIDER NOTE - DIFFERENTIAL DIAGNOSIS
Differential Diagnosis The differential diagnosis for patients clinical presentation includes but is not limited to: anxiety, depression, suicidal ideation, pancreatitis, appendicitis, colitis, IBS

## 2024-12-21 NOTE — ED PROVIDER NOTE - ATTENDING CONTRIBUTION TO CARE
38 yo F pmhx borderline personality disorder, depression, DM, migraines, GERD, presents to ED for eval of suicidal thoughts worsening over past few days, no active plan or attempt. Pt also endorsing nausea and nbnb vomiting today with abdominal pain. Pt also reporting intermittent substernal CP for the past few days. No fevers or chills. No AV hallucinations no HI. No substance use.    CONSTITUTIONAL: NAD.   SKIN: warm, dry  HEAD: Normocephalic; atraumatic.  ENT: MMM.   NECK: Supple.  CARD: RRR.   RESP: No wheezes, rales or rhonchi.  ABD: soft nondistended, mild diffuse tenderness no CVAT.   EXT: Normal ROM.  No lower extremity edema.   NEURO: Alert, oriented, grossly unremarkable.  PSYCH: Cooperative, appropriate.

## 2024-12-22 DIAGNOSIS — F41.9 ANXIETY DISORDER, UNSPECIFIED: ICD-10-CM

## 2024-12-22 DIAGNOSIS — F60.3 BORDERLINE PERSONALITY DISORDER: ICD-10-CM

## 2024-12-22 DIAGNOSIS — Z63.4 DISAPPEARANCE AND DEATH OF FAMILY MEMBER: ICD-10-CM

## 2024-12-22 DIAGNOSIS — R45.851 SUICIDAL IDEATIONS: ICD-10-CM

## 2024-12-22 DIAGNOSIS — F33.2 MAJOR DEPRESSIVE DISORDER, RECURRENT SEVERE WITHOUT PSYCHOTIC FEATURES: ICD-10-CM

## 2024-12-22 LAB
GLUCOSE BLDC GLUCOMTR-MCNC: 330 MG/DL — HIGH (ref 70–99)
GLUCOSE BLDC GLUCOMTR-MCNC: 344 MG/DL — HIGH (ref 70–99)
GLUCOSE BLDC GLUCOMTR-MCNC: 359 MG/DL — HIGH (ref 70–99)
GLUCOSE BLDC GLUCOMTR-MCNC: 444 MG/DL — HIGH (ref 70–99)
GLUCOSE BLDC GLUCOMTR-MCNC: 510 MG/DL — CRITICAL HIGH (ref 70–99)
TROPONIN T, HIGH SENSITIVITY RESULT: 9 NG/L — SIGNIFICANT CHANGE UP (ref 6–13)
TROPONIN T, HIGH SENSITIVITY RESULT: <6 NG/L — SIGNIFICANT CHANGE UP (ref 6–13)

## 2024-12-22 PROCEDURE — 83036 HEMOGLOBIN GLYCOSYLATED A1C: CPT

## 2024-12-22 PROCEDURE — 36415 COLL VENOUS BLD VENIPUNCTURE: CPT

## 2024-12-22 PROCEDURE — 85025 COMPLETE CBC W/AUTO DIFF WBC: CPT

## 2024-12-22 PROCEDURE — 84443 ASSAY THYROID STIM HORMONE: CPT

## 2024-12-22 PROCEDURE — 80053 COMPREHEN METABOLIC PANEL: CPT

## 2024-12-22 PROCEDURE — 80061 LIPID PANEL: CPT

## 2024-12-22 PROCEDURE — 90792 PSYCH DIAG EVAL W/MED SRVCS: CPT | Mod: 95

## 2024-12-22 PROCEDURE — 0225U NFCT DS DNA&RNA 21 SARSCOV2: CPT

## 2024-12-22 PROCEDURE — 82962 GLUCOSE BLOOD TEST: CPT

## 2024-12-22 PROCEDURE — 81001 URINALYSIS AUTO W/SCOPE: CPT

## 2024-12-22 PROCEDURE — 82010 KETONE BODYS QUAN: CPT

## 2024-12-22 PROCEDURE — 71045 X-RAY EXAM CHEST 1 VIEW: CPT

## 2024-12-22 PROCEDURE — 74177 CT ABD & PELVIS W/CONTRAST: CPT | Mod: 26,MC

## 2024-12-22 RX ORDER — SERTRALINE HYDROCHLORIDE 25 MG/1
100 TABLET ORAL DAILY
Refills: 0 | Status: DISCONTINUED | OUTPATIENT
Start: 2024-12-22 | End: 2024-12-31

## 2024-12-22 RX ORDER — INSULIN LISPRO 100/ML
10 VIAL (ML) SUBCUTANEOUS ONCE
Refills: 0 | Status: COMPLETED | OUTPATIENT
Start: 2024-12-22 | End: 2024-12-22

## 2024-12-22 RX ORDER — TRAZODONE HYDROCHLORIDE 150 MG/1
50 TABLET ORAL AT BEDTIME
Refills: 0 | Status: DISCONTINUED | OUTPATIENT
Start: 2024-12-22 | End: 2024-12-31

## 2024-12-22 RX ORDER — DEXTROSE MONOHYDRATE 25 G/50ML
50 INJECTION, SOLUTION INTRAVENOUS ONCE
Refills: 0 | Status: COMPLETED | OUTPATIENT
Start: 2024-12-22 | End: 2024-12-22

## 2024-12-22 RX ORDER — NAPROXEN 500 MG
500 TABLET, DELAYED RELEASE (ENTERIC COATED) ORAL
Refills: 0 | Status: DISCONTINUED | OUTPATIENT
Start: 2024-12-22 | End: 2024-12-31

## 2024-12-22 RX ORDER — METHOCARBAMOL 500 MG
500 TABLET ORAL EVERY 8 HOURS
Refills: 0 | Status: DISCONTINUED | OUTPATIENT
Start: 2024-12-22 | End: 2024-12-31

## 2024-12-22 RX ORDER — BENZONATATE 100 MG
100 CAPSULE ORAL THREE TIMES A DAY
Refills: 0 | Status: DISCONTINUED | OUTPATIENT
Start: 2024-12-22 | End: 2024-12-31

## 2024-12-22 RX ORDER — LORAZEPAM 1 MG/1
2 TABLET ORAL EVERY 6 HOURS
Refills: 0 | Status: DISCONTINUED | OUTPATIENT
Start: 2024-12-22 | End: 2024-12-22

## 2024-12-22 RX ORDER — ARIPIPRAZOLE 10 MG/1
2 TABLET ORAL DAILY
Refills: 0 | Status: DISCONTINUED | OUTPATIENT
Start: 2024-12-22 | End: 2024-12-24

## 2024-12-22 RX ORDER — FAMOTIDINE 20 MG/1
20 TABLET, FILM COATED ORAL AT BEDTIME
Refills: 0 | Status: DISCONTINUED | OUTPATIENT
Start: 2024-12-22 | End: 2024-12-23

## 2024-12-22 RX ORDER — INSULIN GLARGINE-YFGN 100 [IU]/ML
50 INJECTION, SOLUTION SUBCUTANEOUS AT BEDTIME
Refills: 0 | Status: DISCONTINUED | OUTPATIENT
Start: 2024-12-22 | End: 2024-12-24

## 2024-12-22 RX ORDER — HALOPERIDOL DECANOATE 50 MG/ML
5 INJECTION INTRAMUSCULAR EVERY 6 HOURS
Refills: 0 | Status: DISCONTINUED | OUTPATIENT
Start: 2024-12-22 | End: 2024-12-31

## 2024-12-22 RX ADMIN — Medication 10 UNIT(S): at 20:09

## 2024-12-22 RX ADMIN — SERTRALINE HYDROCHLORIDE 100 MILLIGRAM(S): 25 TABLET ORAL at 15:43

## 2024-12-22 RX ADMIN — FAMOTIDINE 20 MILLIGRAM(S): 20 TABLET, FILM COATED ORAL at 21:15

## 2024-12-22 RX ADMIN — Medication 5 MILLIGRAM(S): at 15:44

## 2024-12-22 RX ADMIN — Medication 100 MILLIGRAM(S): at 21:16

## 2024-12-22 RX ADMIN — INSULIN GLARGINE-YFGN 50 UNIT(S): 100 INJECTION, SOLUTION SUBCUTANEOUS at 21:11

## 2024-12-22 RX ADMIN — ARIPIPRAZOLE 2 MILLIGRAM(S): 10 TABLET ORAL at 16:44

## 2024-12-22 RX ADMIN — DEXTROSE MONOHYDRATE 50 MILLILITER(S): 25 INJECTION, SOLUTION INTRAVENOUS at 04:08

## 2024-12-22 SDOH — SOCIAL STABILITY - SOCIAL INSECURITY: DISSAPEARANCE AND DEATH OF FAMILY MEMBER: Z63.4

## 2024-12-22 NOTE — ED BEHAVIORAL HEALTH ASSESSMENT NOTE - DESCRIPTION
Patient reports that she is adopted , and grew up on [place], completed high school but was in special education Patient is reported to be tearful, not agiated As per chart Degenerative disc disease, thoracic ,Gastroesophageal reflux disease, Migraines, Spinal stenosis ,Tremor of right hand ,Type 1 diabetes ,Urinary tract infection, recurrent. Patient is reported to be tearful, not agitated

## 2024-12-22 NOTE — ED BEHAVIORAL HEALTH ASSESSMENT NOTE - UNABLE TO CARE FOR SELF DETAILS
Patient reports that she is not functioning because of her depression and has continued suicidal ideations

## 2024-12-22 NOTE — BH INPATIENT PSYCHIATRY ASSESSMENT NOTE - RISK ASSESSMENT
Risk factors for suicide in this patient are her psychiatric diagnoses, past suicide attempts, recent discharge from the psychiatry floor, current suicidal ideations, and depressed mood, impulsivity, however she has good social support, is in psychiatric treatment and is compliant with medications.

## 2024-12-22 NOTE — BH INPATIENT PSYCHIATRY ASSESSMENT NOTE - HPI (INCLUDE ILLNESS QUALITY, SEVERITY, DURATION, TIMING, CONTEXT, MODIFYING FACTORS, ASSOCIATED SIGNS AND SYMPTOMS)
Patient is a 39 year old  woman, single, has no children, resides with her mother at [facility] and her boyfriend lives at same residence, disabled, on SSI, PMHx of thoracic degenerative disc disease, GERD, headaches, spinal stenosis, tachycardia, right hand tremor, Type 1 diabetes, recurrent UTI, PPHx of Major Depression, Anxiety and Borderline personality Disorder who presented to the ED for the evaluation of worsening depression and suicidal ideations.    In the ED, patient is observed to be sitting in the examination chair, appears anxious, tearful at times, and well oriented to time, place and person. Patient denies  acute symptoms of psychosis or alvina. She denies current use of illicit drugs or alcohol. Patient reports that she was discharged from the psychiatry floor on 12/10/2024 after being admitted there for about a week for depression and suicidal thoughts. Patient reports that she was still having these symptoms on the day she was discharged but that the staff there informed her that she needed to go home. She states "they said I was saying this because I did not want to go home." Patient reports that she continues to have depressed mood, sleeps all the time, has difficulty getting out of bed, has no energy or desire to do anything, and has no pleasure. She reports that she feels hopeless and helpless and is having suicidal thoughts and intent but has no plan at this time. Patient reports that she has been feelings depressed for a while now but the 2 year anniversary of her father's death was on Thanksgiving day and  was her grandfathers birthday and he  3 years ago. She reports that the holidays are very difficult for her and she has not been able to cope with the death of both her father and grandfather.     Patient reports that her psychiatrist is Dr. Curiel at [facility] and he treats her depression with Zoloft 75mg daily. Patient reports that she saw him yesterday but he was reluctant to increase the Zoloft to 100mg because she had complained of having diarrhea. She however reports that she no longer has diarrhea and has no other side effects of Zoloft. Patient reports that she also receives psychotherapy from [facility].    Collateral information was obtained from patient's mother Argelia Mei ([phone]). She reports that patient informed her that she was still depressed and was having suicidal thoughts even on the day that she was discharged from the psychiatry floor. She reports that patient saw both her therapist and psychiatrist yesterday and doesn't see them until  and  respectively. She reports that she is very concerned because it appears that patient's medication is not addressing her depression and she is afraid that patient will try to kill herself if she takes her home again. Patient's mother reports that patient's therapist and psychiatrist are going back and forth as to whether or not she has borderline personality disorder and whether or not she needs DBT or IOP. She reports that patient has multiple medical problems that she is having difficulty coping with in addition to the fact that her father passed away. She states " I'm afraid to take her home because she said that she can't take it anymore and wants to join her father and she may go home and drink bleach or detergent ".    According to the ED team, patient complained of abdominal pain, a CT Scan was ordered . No abnormality was observed.    During today's psychiatry interview on [facility], patient reiterates much of the above same history. She reiterates that she does not feel that she was ready for discharge when she was last sent home from [facility]. She states she has been feeling depressed and suicidal ever since her discharge and this was exacerbated yesterday when her mom and her boyfriend got into a heated argument. Patient states she is feeling sad and depressed, has lost interest in normal activities, has decreased energy/concentration, appetite is good, but states she is sleeping "too much." She "constantly feels like crying." Patient states overall she is "not feeling good" for all of the above mentioned reasons particularly difficultly coping with her father and grandfather's deaths and due to the holiday season. Patient states that she is currently experiencing suicidal ideation without plan. Patient denies AVH. No symptoms of alvina.

## 2024-12-22 NOTE — ED BEHAVIORAL HEALTH ASSESSMENT NOTE - DETAILS
As per chart , patient has reported that Lexapro made her fatigue and Cymbalta made her feel worse [facility] staff informed of the plan Patient was discharged from [facility] 12/10/2024 Mother med of the plan -

## 2024-12-22 NOTE — BH INPATIENT PSYCHIATRY ASSESSMENT NOTE - NSBHASSESSSUMMFT_PSY_ALL_CORE
Patient is a 39 year old woman with a history of Major Depression, Anxiety and Borderline Personality Disorder who presented to the ED for the evaluation of worsening depression and suicidal ideations.   Patient continues to have significant depression, hopelessness, helplessness, anhedonia and continued suicidal ideations. It seems that her feelings of depression are also confounded with her feelings of dysphoria in the context of bereavement and lack of skills to cope with the loss of her father. Patient seems to have significant poor frustration tolerance and very poor impulse control.   At this time, patient is considered a danger to herself and needs inpatient psychiatric hospitalization for medication management and symptom stabilization.    #Major Depressive Disorder  -Increase Zoloft to 100mg po in AM  -Augment with Abilify 2mg po in AM    #Sleep  -Trazodone 50mg po qhs PRN for insomnia    #PRNs  -Haldol 5mg po q6h PRN for agitation  -Ativan 2mg po q6h PRN for agitation, severe anxiety  -Benadryl 50mg po q6h PRN for agitation, insomnia, anxiety   Patient is a 39 year old woman with a history of Major Depression, Anxiety and Borderline Personality Disorder who presented to the ED for the evaluation of worsening depression and suicidal ideations.   Patient continues to have significant depression, hopelessness, helplessness, anhedonia and continued suicidal ideations. It seems that her feelings of depression are also confounded with her feelings of dysphoria in the context of bereavement and lack of skills to cope with the loss of her father. Patient seems to have significant poor frustration tolerance and very poor impulse control.   At this time, patient is considered a danger to herself and needs inpatient psychiatric hospitalization for medication management and symptom stabilization.    #Major Depressive Disorder  -Increase Zoloft to 100mg po od  -Augment with Abilify 2mg po od    #Sleep  -Trazodone 50mg po qhs PRN for insomnia    #PRNs  -Haldol 5mg po q6h PRN for agitation  -Ativan 2mg po q6h PRN for agitation, severe anxiety  -Benadryl 50mg po q6h PRN for agitation, insomnia, anxiety   Patient is a 39 year old woman with a history of Major Depression, Anxiety and Borderline Personality Disorder who presented to the ED for the evaluation of worsening depression and suicidal ideations.   Patient continues to have significant depression, hopelessness, helplessness, anhedonia and continued suicidal ideations. It seems that her feelings of depression are also confounded with her feelings of dysphoria in the context of bereavement and lack of skills to cope with the loss of her father. Patient seems to have significant poor frustration tolerance and very poor impulse control.   At this time, patient is considered a danger to herself and needs inpatient psychiatric hospitalization for medication management and symptom stabilization.    #Major Depressive Disorder  -Increase Zoloft to 100mg po od  -Augment with Abilify 2mg po od    #Sleep  -Trazodone 50mg po qhs PRN for insomnia    #PRNs  -Haldol 5mg po q6h PRN for agitation  -Ativan 2mg po q6h PRN for agitation, severe anxiety  -Benadryl 50mg po q6h PRN for agitation, insomnia, anxiety    #HTN #GERD  #DM  amLODIPine   Tablet 5 milliGRAM(s) Oral daily  famotidine    Tablet 20 milliGRAM(s) Oral at bedtime  insulin glargine Injectable (LANTUS) 50 Unit(s) SubCutaneous at bedtime  - carb consistent diet   - hospitalist consult for glucose management

## 2024-12-22 NOTE — BH PATIENT PROFILE - NSDASAIMPULSIVITY_PSY_ALL_CORE
Rx Authorization:    Requested Medication/ Dose pramipexole (MIRAPEX) 0.5 MG tablet    Date last refill ordered: 10/5/21    Quantity ordered: 90 tablets    # refills: 11    Date of last clinic visit with ordering provider: 10/5/21    Date of next clinic visit with ordering provider:     All pertinent protocol data (lab date/result):     Include pertinent information from patients message:     
No

## 2024-12-22 NOTE — ED BEHAVIORAL HEALTH ASSESSMENT NOTE - PSYCHIATRIC ISSUES AND PLAN (INCLUDE STANDING AND PRN MEDICATION)
1. We recommend increasing to Zoloft 100mg p.o Bedtime 2. We recommend Haldol 5 mg P.O Q 6 hrs ,Benadryl 50mg P.O Q 6 hours and Ativan 2mg P.O Q 6 hrs PRN in combination PRN for agitation H. Please note that this medication combination can be given via the intramuscular route if patient is severely agitated and is considered a danger to herself or others. Please ensure that QTC is < 500

## 2024-12-22 NOTE — BH INPATIENT PSYCHIATRY ASSESSMENT NOTE - CURRENT MEDICATION
MEDICATIONS  (STANDING):    MEDICATIONS  (PRN):   MEDICATIONS  (STANDING):  amLODIPine   Tablet 5 milliGRAM(s) Oral daily  ARIPiprazole 2 milliGRAM(s) Oral daily  famotidine    Tablet 20 milliGRAM(s) Oral at bedtime  insulin glargine Injectable (LANTUS) 50 Unit(s) SubCutaneous at bedtime  sertraline 100 milliGRAM(s) Oral daily    MEDICATIONS  (PRN):  haloperidol     Tablet 5 milliGRAM(s) Oral every 6 hours PRN agiation  hydrOXYzine hydrochloride 50 milliGRAM(s) Oral every 6 hours PRN anxiety and insomnia  LORazepam     Tablet 2 milliGRAM(s) Oral every 6 hours PRN Agitation  methocarbamol 500 milliGRAM(s) Oral every 8 hours PRN Muscle Spasm  naproxen 500 milliGRAM(s) Oral two times a day PRN Moderate Pain (4 - 6)  traZODone 50 milliGRAM(s) Oral at bedtime PRN insomnia

## 2024-12-22 NOTE — BH INPATIENT PSYCHIATRY ASSESSMENT NOTE - NSICDXPASTMEDICALHX_GEN_ALL_CORE_FT
PAST MEDICAL HISTORY:  Degenerative disc disease, thoracic     Gastroesophageal reflux disease, esophagitis presence not specified     Intractable headache, unspecified chronicity pattern, unspecified headache type     Neuropathy right lower extremity, vaginal    Spinal stenosis     Tachycardia     Tremor of right hand     Type 1 diabetes     Urinary tract infection, recurrent

## 2024-12-22 NOTE — ED BEHAVIORAL HEALTH ASSESSMENT NOTE - MODE OF ARRIVAL
allergic rx'n rash, itching on medrol and atarax, no tongue swelling  on Bactrim for sinus infection
Walk in / drive in

## 2024-12-22 NOTE — ED BEHAVIORAL HEALTH ASSESSMENT NOTE - SUMMARY
Ms Mei is a 39 year old woman with a history of Major Depression , Anxiety and Borderline personality Disorder who presented to the ED for the evaluation of worsening depression and suicidal ideations.   Patient continues to have significant depression, hopelessness , helplessness , anhedonia and continued suicidal ideations. It seems  that her feelings of depressions are also confounded with her feelings of dysphoria in the context of bereavement and lack of  skills to cope with the loss of her father . Patient seems to have significant poor frustration tolerance and very, poor impulse control.   At this time, patient is considered a danger to herself  and needs inpatient psychiatric hospitalization for medication management and symptoms stabilization.

## 2024-12-22 NOTE — ED BEHAVIORAL HEALTH ASSESSMENT NOTE - RISK ASSESSMENT
Risk factors for suicide in this patient are her psychiatric diagnoses , past suicide attempts , recent discharge from the psychiatry floor, current suicidal ideations , and depressed mood, impulsivity , however she has good social support , is in psychiatric treatment and is complaint with medications

## 2024-12-22 NOTE — BH INPATIENT PSYCHIATRY ASSESSMENT NOTE - NSBHMETABOLIC_PSY_ALL_CORE_FT
BMI: BMI (kg/m2): 32.1 (12-22-24 @ 13:23)  HbA1c: A1C with Estimated Average Glucose Result: 10.0 % (11-08-24 @ 06:35)    Glucose: POCT Blood Glucose.: 109 mg/dL (12-22-24 @ 08:53)    BP: 111/73 (12-22-24 @ 13:23) (109/63 - 145/86)Vital Signs Last 24 Hrs  T(C): 36.8 (12-22-24 @ 13:23), Max: 36.9 (12-21-24 @ 19:46)  T(F): 98.2 (12-22-24 @ 13:23), Max: 98.4 (12-21-24 @ 19:46)  HR: 103 (12-22-24 @ 13:23) (89 - 103)  BP: 111/73 (12-22-24 @ 13:23) (109/63 - 145/86)  BP(mean): 79 (12-22-24 @ 04:00) (79 - 79)  RR: 17 (12-22-24 @ 13:23) (16 - 18)  SpO2: 99% (12-22-24 @ 13:23) (98% - 99%)      Lipid Panel:  BMI: BMI (kg/m2): 32.1 (12-22-24 @ 13:23)  HbA1c: A1C with Estimated Average Glucose Result: 10.0 % (11-08-24 @ 06:35)    Glucose: POCT Blood Glucose.: 359 mg/dL (12-22-24 @ 16:05)    BP: 111/73 (12-22-24 @ 13:23) (109/63 - 145/86)Vital Signs Last 24 Hrs  T(C): 36.8 (12-22-24 @ 13:23), Max: 36.9 (12-21-24 @ 19:46)  T(F): 98.2 (12-22-24 @ 13:23), Max: 98.4 (12-21-24 @ 19:46)  HR: 103 (12-22-24 @ 13:23) (89 - 103)  BP: 111/73 (12-22-24 @ 13:23) (109/63 - 145/86)  BP(mean): 79 (12-22-24 @ 04:00) (79 - 79)  RR: 17 (12-22-24 @ 13:23) (16 - 18)  SpO2: 99% (12-22-24 @ 13:23) (98% - 99%)      Lipid Panel:

## 2024-12-22 NOTE — ED BEHAVIORAL HEALTH ASSESSMENT NOTE - HPI (INCLUDE ILLNESS QUALITY, SEVERITY, DURATION, TIMING, CONTEXT, MODIFYING FACTORS, ASSOCIATED SIGNS AND SYMPTOMS)
Ms Mei is a 39 year old  woman, single, has no children, resides with hr mother at [facility], disabled , on SSI  with a history of Major Depression , Anxiety and Borderline personality Disorder who presented to the ED for the evaluation of worsening depression and suicidal ideations. Ms Mei is a 39 year old  woman, single, has no children, resides with hr mother at [facility], disabled , on SSI  with a history of Major Depression , Anxiety and Borderline personality Disorder who presented to the ED for the evaluation of worsening depression and suicidal ideations.  Patient seen and interviewed. 1 to 1 at bedside.      Upon approach, patient is observed to be sitting in the examination chair , appears anxious, tearful at times  well oriented to time, place and person.   Patient denies  acute symptoms of psychosis or alvina . She denies current use of illicit drugs or alcohol.   Patient reports that she was discharged from the psychiatry floor on 12/10/2024 after being admitted there for about a week for depression and suicidal thoughts . patient reports that she was still having these symptoms on the day she was discharged but that the staff there informed her that she needed to go home. She states " they said I was saying this because I did not want to go home ". Patient reports that she continues to have depressed mood, sleeps all the time, has difficulty getting out of bed , has no energy or desire to do anything , and has no pleasure . She reports that she feels hopeless and helpless and is having suicidal thoughts and intent but has no plan at this time. Patient reports that she has been feelings depressed for a while now but the 2 year anniversary of her father's death was on  Thanksgiving day and the  was her grandfathers birthday and he  3 years ago. She reports that the holidays are very difficult for her an she has not been able to cope wit the death of both her father and grandfather .   Patient reports that her psychiatrist is Dr Sutton at [facility] and he treats her depression with Zoloft 75mg daily. Patient reports that she saw him yesterday but he was reluctant to increase the Zoloft to 100mg because she had complained of having diarrhea . She however reports that she no longer has diarrhea and has not other side effects of Zoloft . Patient reports that she also receives psychotherapy from [facility] .     Collateral information was obtained from patient's mother Argelia Mei ( [phone] ). She reports that patient informed her that she was still depressed and was having suicidal thoughts s even on the day that she was discharged from the psychiatry floor . She reports that patient saw both her therapist and psychiatrist yesterday  and doesn't see them until  and  respectively. She reports that she is very concerned because it appears that patient's medication is not addressing her depression and she is afraid that patient will try to kill herself if she take her home again. Patient's mother reports that patient's therapist and psychiatrist are going back and forth as to whether or not she has borderline personality disorder and whether oe not she needs DBT or IOP. She reports that patient has multiple medical problems that she is having difficulty coping with in addition to the fact that her father passed away . She states " im afraid to take her home because she said that she cant take it anymore and wants to join her father  and she may go home and drink bleach or detergent ". Ms Mei is a 39 year old  woman, single, has no children, resides with hr mother at [facility], disabled , on SSI  with a history of Major Depression , Anxiety and Borderline personality Disorder who presented to the ED for the evaluation of worsening depression and suicidal ideations.  Patient seen and interviewed. 1 to 1 at bedside.      Upon approach, patient is observed to be sitting in the examination chair , appears anxious, tearful at times  well oriented to time, place and person.   Patient denies  acute symptoms of psychosis or alvina . She denies current use of illicit drugs or alcohol.   Patient reports that she was discharged from the psychiatry floor on 12/10/2024 after being admitted there for about a week for depression and suicidal thoughts . patient reports that she was still having these symptoms on the day she was discharged but that the staff there informed her that she needed to go home. She states " they said I was saying this because I did not want to go home ". Patient reports that she continues to have depressed mood, sleeps all the time, has difficulty getting out of bed , has no energy or desire to do anything , and has no pleasure . She reports that she feels hopeless and helpless and is having suicidal thoughts and intent but has no plan at this time. Patient reports that she has been feelings depressed for a while now but the 2 year anniversary of her father's death was on  Thanksgiving day and the  was her grandfathers birthday and he  3 years ago. She reports that the holidays are very difficult for her an she has not been able to cope wit the death of both her father and grandfather .   Patient reports that her psychiatrist is Dr Sutton at [facility] and he treats her depression with Zoloft 75mg daily. Patient reports that she saw him yesterday but he was reluctant to increase the Zoloft to 100mg because she had complained of having diarrhea . She however reports that she no longer has diarrhea and has not other side effects of Zoloft . Patient [facility]     Collateral information was obtained from patient's mother Argelia Mei ( [phone] ). She reports that patient informed her that she was still depressed and was having suicidal thoughts s even on the day that she was discharged from the psychiatry floor . She reports that patient saw both her therapist and psychiatrist yesterday  and doesn't see them until  and  respectively. She reports that she is very concerned because it appears that patient's medication is not addressing her depression and she is afraid that patient will try to kill herself if she take her home again. Patient's mother reports that patient's therapist and psychiatrist are going back and forth as to whether or not she has borderline personality disorder and whether oe not she needs DBT or IOP. She reports that patient has multiple medical problems that she is having difficulty coping with in addition to the fact that her father passed away . She states " im afraid to take her home because she said that she cant take it anymore and wants to join her father  and she may go home and drink bleach or detergent ".    According to the ED team, patient complained of abdominal pain, a CT Scan was ordered . No abnormality was observed .

## 2024-12-22 NOTE — BH INPATIENT PSYCHIATRY ASSESSMENT NOTE - NSBHCHARTREVIEWVS_PSY_A_CORE FT
Vital Signs Last 24 Hrs  T(C): 36.8 (12-22-24 @ 13:23), Max: 36.9 (12-21-24 @ 19:46)  T(F): 98.2 (12-22-24 @ 13:23), Max: 98.4 (12-21-24 @ 19:46)  HR: 103 (12-22-24 @ 13:23) (89 - 103)  BP: 111/73 (12-22-24 @ 13:23) (109/63 - 145/86)  BP(mean): 79 (12-22-24 @ 04:00) (79 - 79)  RR: 17 (12-22-24 @ 13:23) (16 - 18)  SpO2: 99% (12-22-24 @ 13:23) (98% - 99%)

## 2024-12-22 NOTE — BH INPATIENT PSYCHIATRY ASSESSMENT NOTE - DESCRIPTION
Patient reports that she is adopted, and grew up on [place], completed high school but was in special education.

## 2024-12-22 NOTE — ED BEHAVIORAL HEALTH ASSESSMENT NOTE - AXIS III
Degenerative disc disease, thoracic ,Gastroesophageal reflux disease, Migraines, Spinal stenosis ,Tremor of right hand ,Type 1 diabetes ,Urinary tract infection, recurrent.

## 2024-12-22 NOTE — BH INPATIENT PSYCHIATRY ASSESSMENT NOTE - DETAILS
As per chart, patient has reported that Lexapro made her fatigue and Cymbalta made her feel worse. As per HPI.

## 2024-12-22 NOTE — ED BEHAVIORAL HEALTH ASSESSMENT NOTE - OTHER PAST PSYCHIATRIC HISTORY (INCLUDE DETAILS REGARDING ONSET, COURSE OF ILLNESS, INPATIENT/OUTPATIENT TREATMENT)
Patient reports that she has been admitted to the psychiatry floor over 10 times and has tried to kill herself 6 times , last time was by drinking bleach

## 2024-12-22 NOTE — ED BEHAVIORAL HEALTH ASSESSMENT NOTE - NSBHMSEIMPULSE_PSY_A_CORE
Last Visit: 6/13/24    Next Visit: none    Name of Provider:  Dr. Stallings    Assessment:   LOV with Dr. Stallings on 6/13/24 had lumbar XR prior for One year post op follow-up.      He is s/p L3-4 extension TLIF on 5/10/2023.    Low back pain that radiates to right leg reports this is new pain since LOV with Dr. Stallings. Started about a month ago . Does note some discomfort with walking long distances but notes its helpful for him to get up and move/walk.     Rates pain 10/10 in AM and 5/10 during the day pain a little better since he's up moving and able to take OTC pain relievers.     Using ice , stretching exercises, ibuprofen, tylenol.      Reviewed red flag symptoms. Patient denies new numbness, tingling, weakness, foot drag/drop, saddle anesthesia, incontinence, intractable pain, or shortness of breath. Informed patient to seek emergency care should these symptoms arise.    Recommendation given:   -routed to care team for review    Action needed from provider:  Please review and provide recs        Normal

## 2024-12-22 NOTE — BH PATIENT PROFILE - NSSBIRTDRGACTION/INTER_GEN_A_CORE
"Oncology Rooming Note    December 23, 2019 5:30 PM   Daniel Sandhu is a 38 year old male who presents for:    Chief Complaint   Patient presents with     Oncology Clinic Visit     Return; Esophageal Ca     Initial Vitals: /83   Pulse 71   Temp 97.9  F (36.6  C) (Oral)   Resp 13   Ht 1.71 m (5' 7.32\")   Wt 67 kg (147 lb 11.2 oz)   SpO2 99%   BMI 22.91 kg/m   Estimated body mass index is 22.91 kg/m  as calculated from the following:    Height as of this encounter: 1.71 m (5' 7.32\").    Weight as of this encounter: 67 kg (147 lb 11.2 oz). Body surface area is 1.78 meters squared.  No Pain (0) Comment: Data Unavailable   No LMP for male patient.  Allergies reviewed: Yes   Medications reviewed: Yes    Medications: MEDICATION REFILLS NEEDED TODAY. Provider was notified.  Pharmacy name entered into EPIC:    [facility] - [place], [place] - [address] [facility] - [place], [place] - [address] [facility] [place], [place] - [address] [facility] [phone]    Clinical concerns: Needs refill on Bacttiffaniean       Keri Littlejohn CMA              " None Positive reinforcement

## 2024-12-22 NOTE — BH PATIENT PROFILE - HOME MEDICATIONS
famotidine 40 mg oral tablet , 1 tab(s) orally once a day (at bedtime)  methocarbamol 500 mg oral tablet , 2 tab(s) orally every 8 hours As needed back pain  metoprolol tartrate 25 mg oral tablet , 1 tab(s) orally 3 times a day  insulin lispro 100 units/mL injectable solution , 15 unit(s) subcutaneous 3 times a day (with meals)  insulin glargine 100 units/mL subcutaneous solution , 50 unit(s) subcutaneous once a day (at bedtime)  sertraline 25 mg oral tablet , 3 tab(s) orally once a day (at bedtime) x 14 days Continue to take as prescribed until told otherwise by your provider  naproxen 500 mg oral tablet , 1 tab(s) orally every 12 hours As needed pain  omeprazole 40 mg oral delayed release capsule , 1 cap(s) orally once a day  Norvasc 5 mg oral tablet , 1 tab(s) orally once a day

## 2024-12-22 NOTE — BH INPATIENT PSYCHIATRY ASSESSMENT NOTE - OTHER PAST PSYCHIATRIC HISTORY (INCLUDE DETAILS REGARDING ONSET, COURSE OF ILLNESS, INPATIENT/OUTPATIENT TREATMENT)
Patient reports that she has been admitted to the psychiatry floor over 10 times and has tried to kill herself 6 times, last time was by drinking bleach.  Remainder as per HPI.

## 2024-12-23 LAB
A1C WITH ESTIMATED AVERAGE GLUCOSE RESULT: 9.5 % — HIGH (ref 4–5.6)
ALBUMIN SERPL ELPH-MCNC: 3.9 G/DL — SIGNIFICANT CHANGE UP (ref 3.5–5.2)
ALP SERPL-CCNC: 130 U/L — HIGH (ref 30–115)
ALT FLD-CCNC: 54 U/L — HIGH (ref 0–41)
ANION GAP SERPL CALC-SCNC: 15 MMOL/L — HIGH (ref 7–14)
AST SERPL-CCNC: 34 U/L — SIGNIFICANT CHANGE UP (ref 0–41)
BASOPHILS # BLD AUTO: 0.05 K/UL — SIGNIFICANT CHANGE UP (ref 0–0.2)
BASOPHILS NFR BLD AUTO: 0.4 % — SIGNIFICANT CHANGE UP (ref 0–1)
BILIRUB SERPL-MCNC: <0.2 MG/DL — SIGNIFICANT CHANGE UP (ref 0.2–1.2)
BUN SERPL-MCNC: 10 MG/DL — SIGNIFICANT CHANGE UP (ref 10–20)
CALCIUM SERPL-MCNC: 8.7 MG/DL — SIGNIFICANT CHANGE UP (ref 8.4–10.5)
CHLORIDE SERPL-SCNC: 98 MMOL/L — SIGNIFICANT CHANGE UP (ref 98–110)
CHOLEST SERPL-MCNC: 140 MG/DL — SIGNIFICANT CHANGE UP
CO2 SERPL-SCNC: 19 MMOL/L — SIGNIFICANT CHANGE UP (ref 17–32)
CREAT SERPL-MCNC: 0.7 MG/DL — SIGNIFICANT CHANGE UP (ref 0.7–1.5)
EGFR: 113 ML/MIN/1.73M2 — SIGNIFICANT CHANGE UP
EOSINOPHIL # BLD AUTO: 0 K/UL — SIGNIFICANT CHANGE UP (ref 0–0.7)
EOSINOPHIL NFR BLD AUTO: 0 % — SIGNIFICANT CHANGE UP (ref 0–8)
ESTIMATED AVERAGE GLUCOSE: 226 MG/DL — HIGH (ref 68–114)
GLUCOSE BLDC GLUCOMTR-MCNC: 210 MG/DL — HIGH (ref 70–99)
GLUCOSE BLDC GLUCOMTR-MCNC: 216 MG/DL — HIGH (ref 70–99)
GLUCOSE BLDC GLUCOMTR-MCNC: 229 MG/DL — HIGH (ref 70–99)
GLUCOSE BLDC GLUCOMTR-MCNC: 261 MG/DL — HIGH (ref 70–99)
GLUCOSE BLDC GLUCOMTR-MCNC: 287 MG/DL — HIGH (ref 70–99)
GLUCOSE BLDC GLUCOMTR-MCNC: 310 MG/DL — HIGH (ref 70–99)
GLUCOSE BLDC GLUCOMTR-MCNC: 365 MG/DL — HIGH (ref 70–99)
GLUCOSE BLDC GLUCOMTR-MCNC: 463 MG/DL — CRITICAL HIGH (ref 70–99)
GLUCOSE BLDC GLUCOMTR-MCNC: 478 MG/DL — CRITICAL HIGH (ref 70–99)
GLUCOSE SERPL-MCNC: 375 MG/DL — HIGH (ref 70–99)
HCT VFR BLD CALC: 29.9 % — LOW (ref 37–47)
HDLC SERPL-MCNC: 41 MG/DL — LOW
HGB BLD-MCNC: 9.2 G/DL — LOW (ref 12–16)
IMM GRANULOCYTES NFR BLD AUTO: 0.7 % — HIGH (ref 0.1–0.3)
LIPID PNL WITH DIRECT LDL SERPL: 79 MG/DL — SIGNIFICANT CHANGE UP
LYMPHOCYTES # BLD AUTO: 0.53 K/UL — LOW (ref 1.2–3.4)
LYMPHOCYTES # BLD AUTO: 4.4 % — LOW (ref 20.5–51.1)
MCHC RBC-ENTMCNC: 22.8 PG — LOW (ref 27–31)
MCHC RBC-ENTMCNC: 30.8 G/DL — LOW (ref 32–37)
MCV RBC AUTO: 74.2 FL — LOW (ref 81–99)
MONOCYTES # BLD AUTO: 1.01 K/UL — HIGH (ref 0.1–0.6)
MONOCYTES NFR BLD AUTO: 8.4 % — SIGNIFICANT CHANGE UP (ref 1.7–9.3)
NEUTROPHILS # BLD AUTO: 10.35 K/UL — HIGH (ref 1.4–6.5)
NEUTROPHILS NFR BLD AUTO: 86.1 % — HIGH (ref 42.2–75.2)
NON HDL CHOLESTEROL: 99 MG/DL — SIGNIFICANT CHANGE UP
NRBC # BLD: 0 /100 WBCS — SIGNIFICANT CHANGE UP (ref 0–0)
PLATELET # BLD AUTO: 509 K/UL — HIGH (ref 130–400)
PMV BLD: 9.7 FL — SIGNIFICANT CHANGE UP (ref 7.4–10.4)
POTASSIUM SERPL-MCNC: 4.8 MMOL/L — SIGNIFICANT CHANGE UP (ref 3.5–5)
POTASSIUM SERPL-SCNC: 4.8 MMOL/L — SIGNIFICANT CHANGE UP (ref 3.5–5)
PROT SERPL-MCNC: 6.4 G/DL — SIGNIFICANT CHANGE UP (ref 6–8)
RBC # BLD: 4.03 M/UL — LOW (ref 4.2–5.4)
RBC # FLD: 17.3 % — HIGH (ref 11.5–14.5)
SODIUM SERPL-SCNC: 132 MMOL/L — LOW (ref 135–146)
TRIGL SERPL-MCNC: 99 MG/DL — SIGNIFICANT CHANGE UP
TSH SERPL-MCNC: 0.66 UIU/ML — SIGNIFICANT CHANGE UP (ref 0.27–4.2)
WBC # BLD: 12.03 K/UL — HIGH (ref 4.8–10.8)
WBC # FLD AUTO: 12.03 K/UL — HIGH (ref 4.8–10.8)

## 2024-12-23 PROCEDURE — 99232 SBSQ HOSP IP/OBS MODERATE 35: CPT

## 2024-12-23 PROCEDURE — 71045 X-RAY EXAM CHEST 1 VIEW: CPT | Mod: 26

## 2024-12-23 RX ORDER — FAMOTIDINE 20 MG/1
40 TABLET, FILM COATED ORAL AT BEDTIME
Refills: 0 | Status: DISCONTINUED | OUTPATIENT
Start: 2024-12-23 | End: 2024-12-31

## 2024-12-23 RX ORDER — GUAIFENESIN/DEXTROMETHORPHAN 200-10MG/5
10 LIQUID (ML) ORAL EVERY 6 HOURS
Refills: 0 | Status: DISCONTINUED | OUTPATIENT
Start: 2024-12-23 | End: 2024-12-31

## 2024-12-23 RX ORDER — ACETAMINOPHEN 80 MG/.8ML
650 SOLUTION/ DROPS ORAL EVERY 6 HOURS
Refills: 0 | Status: DISCONTINUED | OUTPATIENT
Start: 2024-12-23 | End: 2024-12-31

## 2024-12-23 RX ORDER — INSULIN LISPRO 100/ML
6 VIAL (ML) SUBCUTANEOUS ONCE
Refills: 0 | Status: COMPLETED | OUTPATIENT
Start: 2024-12-23 | End: 2024-12-23

## 2024-12-23 RX ORDER — INSULIN LISPRO 100/ML
15 VIAL (ML) SUBCUTANEOUS
Refills: 0 | Status: DISCONTINUED | OUTPATIENT
Start: 2024-12-23 | End: 2024-12-24

## 2024-12-23 RX ORDER — ONDANSETRON 4 MG/1
4 TABLET ORAL EVERY 6 HOURS
Refills: 0 | Status: DISCONTINUED | OUTPATIENT
Start: 2024-12-23 | End: 2024-12-31

## 2024-12-23 RX ORDER — POLYETHYLENE GLYCOL 3350 17 G/DOSE
17 POWDER (GRAM) ORAL EVERY 24 HOURS
Refills: 0 | Status: DISCONTINUED | OUTPATIENT
Start: 2024-12-23 | End: 2024-12-31

## 2024-12-23 RX ORDER — INSULIN LISPRO 100/ML
10 VIAL (ML) SUBCUTANEOUS ONCE
Refills: 0 | Status: COMPLETED | OUTPATIENT
Start: 2024-12-23 | End: 2024-12-23

## 2024-12-23 RX ORDER — INSULIN LISPRO 100/ML
VIAL (ML) SUBCUTANEOUS
Refills: 0 | Status: DISCONTINUED | OUTPATIENT
Start: 2024-12-23 | End: 2024-12-31

## 2024-12-23 RX ORDER — METOPROLOL TARTRATE 50 MG
25 TABLET ORAL THREE TIMES A DAY
Refills: 0 | Status: DISCONTINUED | OUTPATIENT
Start: 2024-12-23 | End: 2024-12-31

## 2024-12-23 RX ORDER — PANTOPRAZOLE 40 MG/1
40 TABLET, DELAYED RELEASE ORAL
Refills: 0 | Status: DISCONTINUED | OUTPATIENT
Start: 2024-12-24 | End: 2024-12-31

## 2024-12-23 RX ADMIN — Medication 15 UNIT(S): at 08:46

## 2024-12-23 RX ADMIN — INSULIN GLARGINE-YFGN 50 UNIT(S): 100 INJECTION, SOLUTION SUBCUTANEOUS at 20:19

## 2024-12-23 RX ADMIN — Medication 10 UNIT(S): at 01:19

## 2024-12-23 RX ADMIN — ARIPIPRAZOLE 2 MILLIGRAM(S): 10 TABLET ORAL at 08:49

## 2024-12-23 RX ADMIN — FAMOTIDINE 40 MILLIGRAM(S): 20 TABLET, FILM COATED ORAL at 20:20

## 2024-12-23 RX ADMIN — Medication 100 MILLIGRAM(S): at 11:28

## 2024-12-23 RX ADMIN — ACETAMINOPHEN 650 MILLIGRAM(S): 80 SOLUTION/ DROPS ORAL at 16:39

## 2024-12-23 RX ADMIN — Medication 12: at 16:35

## 2024-12-23 RX ADMIN — SERTRALINE HYDROCHLORIDE 100 MILLIGRAM(S): 25 TABLET ORAL at 08:46

## 2024-12-23 RX ADMIN — Medication 15 UNIT(S): at 16:36

## 2024-12-23 RX ADMIN — Medication 10 MILLILITER(S): at 11:28

## 2024-12-23 RX ADMIN — ONDANSETRON 4 MILLIGRAM(S): 4 TABLET ORAL at 11:28

## 2024-12-23 RX ADMIN — Medication 25 MILLIGRAM(S): at 14:12

## 2024-12-23 RX ADMIN — Medication 15 UNIT(S): at 14:08

## 2024-12-23 RX ADMIN — Medication 5 MILLIGRAM(S): at 08:46

## 2024-12-23 RX ADMIN — Medication 6 UNIT(S): at 20:19

## 2024-12-23 RX ADMIN — Medication 6: at 11:39

## 2024-12-23 RX ADMIN — Medication 25 MILLIGRAM(S): at 20:20

## 2024-12-23 RX ADMIN — Medication 6 UNIT(S): at 03:26

## 2024-12-23 RX ADMIN — Medication 25 MILLIGRAM(S): at 08:51

## 2024-12-23 NOTE — BH INPATIENT PSYCHIATRY PROGRESS NOTE - NSBHFUPINTERVALHXFT_PSY_A_CORE
Patient seen and evaluated 12/23/24. On approach patient calm and cooperative. No agitation or aggression noted. Patient stated she has been depressed since she was discharged. States she did not feel ready to leave. Although patient does no present as depressed. Was out of her room, on the phone, engaging with peers. Zoloft increased this weekend and Abilify added to augment Zoloft. Denies any side effects/adverse reactions. No side effects/adverse reactions noted. Will continue to monitor and titrate accordingly. Patient denies any suicidal/homicidal ideations. Patient denies any A/V hallucinations. No evidence of psychosis noted. Patient encouraged to attend groups.     Per Dr. Curiel, through TEAMS: "She was calm and euthymic when we met on Friday.  We discussed personality disorder and DBT work.  She seemed quite interested." Patient seen and evaluated 12/23/24. On approach patient calm and cooperative. No agitation or aggression noted. Patient stated she has been depressed since she was discharged. States she did not feel ready to leave. Although patient does no present as depressed. Was out of her room, on the phone, engaging with peers. Zoloft increased this weekend and Abilify added to augment Zoloft. Denies any side effects/adverse reactions. No side effects/adverse reactions noted. Will continue to monitor and titrate accordingly. Patient denies any suicidal/homicidal ideations. Patient denies any A/V hallucinations. No evidence of psychosis noted. Patient encouraged to attend groups.     Per Dr. Curiel, through TEAMS: "She was calm and euthymic when we met on Friday.  We discussed personality disorder and DBT work.  She seemed quite interested."    Per Dr. Florence, on call this weekend:"The PA from [place] (who saw the pt as the pt before she arrived on IPP but didn't get a chance to tell the pt due to the transfer) informed me that the patient has a hepatic hypodensity on CT imaging and should get an outpatient MRI after discharge. The PA said there is nothing acute to do for the liver hypodensity while on IPP. Please make the pt aware that she should follow up for outpatient liver MRI after discharge. "

## 2024-12-23 NOTE — BH INPATIENT PSYCHIATRY PROGRESS NOTE - CURRENT MEDICATION
MEDICATIONS  (STANDING):  amLODIPine   Tablet 5 milliGRAM(s) Oral daily  ARIPiprazole 2 milliGRAM(s) Oral daily  famotidine    Tablet 40 milliGRAM(s) Oral at bedtime  insulin glargine Injectable (LANTUS) 50 Unit(s) SubCutaneous at bedtime  insulin lispro (ADMELOG) corrective regimen sliding scale   SubCutaneous three times a day before meals  insulin lispro Injectable (ADMELOG) 15 Unit(s) SubCutaneous three times a day with meals  metoprolol tartrate 25 milliGRAM(s) Oral three times a day  sertraline 100 milliGRAM(s) Oral daily    MEDICATIONS  (PRN):  benzonatate 100 milliGRAM(s) Oral three times a day PRN Cough  guaifenesin/dextromethorphan Oral Liquid 10 milliLiter(s) Oral every 6 hours PRN cough  haloperidol     Tablet 5 milliGRAM(s) Oral every 6 hours PRN agiation  hydrOXYzine hydrochloride 50 milliGRAM(s) Oral every 6 hours PRN anxiety and insomnia  LORazepam     Tablet 2 milliGRAM(s) Oral every 6 hours PRN Agitation  methocarbamol 500 milliGRAM(s) Oral every 8 hours PRN Muscle Spasm  naproxen 500 milliGRAM(s) Oral two times a day PRN Moderate Pain (4 - 6)  ondansetron   Disintegrating Tablet 4 milliGRAM(s) Oral every 6 hours PRN Nausea  traZODone 50 milliGRAM(s) Oral at bedtime PRN insomnia   MEDICATIONS  (STANDING):  amLODIPine   Tablet 5 milliGRAM(s) Oral daily  dextrose 5%. 1000 milliLiter(s) (50 mL/Hr) IV Continuous <Continuous>  dextrose 5%. 1000 milliLiter(s) (100 mL/Hr) IV Continuous <Continuous>  dextrose 50% Injectable 25 Gram(s) IV Push once  dextrose 50% Injectable 12.5 Gram(s) IV Push once  dextrose 50% Injectable 25 Gram(s) IV Push once  famotidine    Tablet 40 milliGRAM(s) Oral at bedtime  glucagon  Injectable 1 milliGRAM(s) IntraMuscular once  insulin glargine Injectable (LANTUS) 54 Unit(s) SubCutaneous at bedtime  insulin glargine Injectable (LANTUS) 20 Unit(s) SubCutaneous every morning  insulin lispro (ADMELOG) corrective regimen sliding scale   SubCutaneous three times a day before meals  insulin lispro Injectable (ADMELOG) 18 Unit(s) SubCutaneous three times a day with meals  metoprolol tartrate 25 milliGRAM(s) Oral three times a day  pantoprazole    Tablet 40 milliGRAM(s) Oral before breakfast  sertraline 100 milliGRAM(s) Oral daily    MEDICATIONS  (PRN):  acetaminophen     Tablet .. 650 milliGRAM(s) Oral every 6 hours PRN Temp greater or equal to 38C (100.4F), Mild Pain (1 - 3), Moderate Pain (4 - 6)  benzonatate 100 milliGRAM(s) Oral three times a day PRN Cough  dextrose Oral Gel 15 Gram(s) Oral once PRN Blood Glucose LESS THAN 70 milliGRAM(s)/deciliter  guaifenesin/dextromethorphan Oral Liquid 10 milliLiter(s) Oral every 6 hours PRN cough  haloperidol     Tablet 5 milliGRAM(s) Oral every 6 hours PRN agiation  LORazepam     Tablet 2 milliGRAM(s) Oral every 6 hours PRN Agitation  methocarbamol 500 milliGRAM(s) Oral every 8 hours PRN Muscle Spasm  naproxen 500 milliGRAM(s) Oral two times a day PRN Moderate Pain (4 - 6)  ondansetron   Disintegrating Tablet 4 milliGRAM(s) Oral every 6 hours PRN Nausea  polyethylene glycol 3350 17 Gram(s) Oral every 24 hours PRN constipation  traZODone 50 milliGRAM(s) Oral at bedtime PRN insomnia

## 2024-12-23 NOTE — UM REPORT PROGRESS NOTE - NSUMRMPROVIDER_GEN_A_CORE FT
[id number]  CRISTI MONTGOMERY  4/30/1966     [facility] ID  [id number]  REF# [id number]  AUTH STRT DATE 12/22  DID NOT PROVIDE INSURANCE CM INFO   FAXED CLINCALS [phone]  VALENTINA CRISTI  4/30/1966     [facility] ID  [id number]  REF# [id number]  AUTH STRT DATE 12/22  DID NOT PROVIDE INSURANCE CM INFO   FAXED CLINCALS -[phone]  12/23 Norma - received call from Leeanne - patient approved 4 days. 12/22 to 12/26. LCD and review on 12/26 with Leeanne.  [phone]  CRISTI MONTGOMERY  4/30/1966     [facility] ID  [id number]  REF# [id number]  AUTH STRT DATE 12/22  DID NOT PROVIDE INSURANCE CM INFO   FAXED CLINCALS -[phone]    12/23 Norma - received call from [facility] - patient approved 4 days. 12/22 to 12/26. [facility]     12/26- Emailed updated clinicals to [facility]. Awaiting for determination. [phone]  CRISTI MONTGOMERY  4/30/1966     [facility] ID  [id number]  REF# [id number]  AUTH STRT DATE 12/22  DID NOT PROVIDE INSURANCE CM INFO   FAXED CLINCALS -[phone]    12/23 Norma - received call from [facility] - patient approved 4 days. 12/22 to 12/26. LCD and review on 12/26 with [facility].     12/26- Emailed updated clinicals to [facility]. Awaiting for determination.    12/27- Approved 4 additional days. LCD 12/30. Peer to Peer may happen if pt does not DC by 12/30. [phone]  CRISTI MONTGOMERY  4/30/1966     [facility] ID  [id number]  REF# [id number]  AUTH STRT DATE 12/22  DID NOT PROVIDE INSURANCE CM INFO   FAXED CLINCALS -[phone]    12/23 Norma - received call from [facility] - patient approved 4 days. 12/22 to 12/26. LCD [facility]     12/26- Emailed updated clinicals to [facility]. Awaiting for determination.    12/27- Approved 4 additional days. LCD 12/30. Peer to Peer may happen if pt does not DC by 12/30.  12/31 [facility]

## 2024-12-23 NOTE — BH INPATIENT PSYCHIATRY PROGRESS NOTE - NSBHASSESSSUMMFT_PSY_ALL_CORE
Patient is a 39 year old woman with a history of Major Depression, Anxiety and Borderline Personality Disorder who presented to the ED for the evaluation of worsening depression and suicidal ideations.   Patient continues to have significant depression, hopelessness, helplessness, anhedonia and continued suicidal ideations. It seems that her feelings of depression are also confounded with her feelings of dysphoria in the context of bereavement and lack of skills to cope with the loss of her father. Patient seems to have significant poor frustration tolerance and very poor impulse control.   At this time, patient is considered a danger to herself and needs inpatient psychiatric hospitalization for medication management and symptom stabilization.    Patient seen and evaluated 12/23/24. Patient stated she has been depressed since she was discharged. States she did not feel ready to leave. Although patient does no present as depressed. Was out of her room, on the phone, engaging with peers. Zoloft increased this weekend and Abilify added to augment Zoloft. Patient denies any suicidal/homicidal ideations. Patient denies any A/V hallucinations. No evidence of psychosis noted. Patient encouraged to attend groups.     Per Dr. Curiel, through TEAMS: "She was calm and euthymic when we met on Friday.  We discussed personality disorder and DBT work.  She seemed quite interested."  #Major Depressive Disorder  -Increase Zoloft to 100mg po od  -Augment with Abilify 2mg po od    #Sleep  -Trazodone 50mg po qhs PRN for insomnia    #HTN #GERD  #DM  amLODIPine   Tablet 5 milliGRAM(s) Oral daily  Matoprolol Tartrate 25mg TID  famotidine    Tablet 20 milliGRAM(s) Oral at bedtime  Pantoprazole 40mg daily  insulin glargine Injectable (LANTUS) 50 Unit(s) SubCutaneous at bedtime  Isulin lispro 15units TID with meals  - carb consistent diet   - hospitalist consult for glucose management     #PRNs  -Haldol 5mg po q6h PRN for agitation  -Ativan 2mg po q6h PRN for agitation, severe anxiety  -Benadryl 50mg po q6h PRN for agitation, insomnia, anxiety  -Robaxin PRN for pain  -Robitussin PRN and Tessalon Pearls PRN for cough       Patient is a 39 year old woman with a history of Major Depression, Anxiety and Borderline Personality Disorder who presented to the ED for the evaluation of worsening depression and suicidal ideations.   Patient continues to have significant depression, hopelessness, helplessness, anhedonia and continued suicidal ideations. It seems that her feelings of depression are also confounded with her feelings of dysphoria in the context of bereavement and lack of skills to cope with the loss of her father. Patient seems to have significant poor frustration tolerance and very poor impulse control.   At this time, patient is considered a danger to herself and needs inpatient psychiatric hospitalization for medication management and symptom stabilization.    Patient seen and evaluated 12/23/24. Patient stated she has been depressed since she was discharged. States she did not feel ready to leave. Although patient does no present as depressed. Was out of her room, on the phone, engaging with peers. Zoloft increased this weekend and Abilify added to augment Zoloft. Patient denies any suicidal/homicidal ideations. Patient denies any A/V hallucinations. No evidence of psychosis noted. Patient encouraged to attend groups.     Per Dr. Curiel, through TEAMS: "She was calm and euthymic when we met on Friday.  We discussed personality disorder and DBT work.  She seemed quite interested."    Per Dr. Florence, on call this weekend:"The PA from [place] (who saw the pt as the pt before she arrived on IPP but didn't get a chance to tell the pt due to the transfer) informed me that the patient has a hepatic hypodensity on CT imaging and should get an outpatient MRI after discharge. The PA said there is nothing acute to do for the liver hypodensity while on IPP. Please make the pt aware that she should follow up for outpatient liver MRI after discharge."     #Major Depressive Disorder  -Increase Zoloft to 100mg po od  -Augment with Abilify 2mg po od    #Sleep  -Trazodone 50mg po qhs PRN for insomnia    #HTN #GERD  #DM  amLODIPine   Tablet 5 milliGRAM(s) Oral daily  Matoprolol Tartrate 25mg TID  famotidine    Tablet 20 milliGRAM(s) Oral at bedtime  Pantoprazole 40mg daily  insulin glargine Injectable (LANTUS) 50 Unit(s) SubCutaneous at bedtime  Isulin lispro 15units TID with meals  - carb consistent diet   - hospitalist consult for glucose management     #PRNs  -Haldol 5mg po q6h PRN for agitation  -Ativan 2mg po q6h PRN for agitation, severe anxiety  -Benadryl 50mg po q6h PRN for agitation, insomnia, anxiety  -Robaxin PRN for pain  -Robitussin PRN and Tessalon Pearls PRN for cough

## 2024-12-23 NOTE — CONSULT NOTE ADULT - ASSESSMENT
#DM, uncontrolled  -FS trend noted, pt endorses not being compliant with her insulin regimen, which is now restarted  -In light of FS still uncontrolled and minor anion gap on BMP, repeat BMP and check beta-hydroxy level, contact ICU if beta-hydroxy elevated, may need insulin gtt   -A1C 9.5  -Get endocrine eval   -Morning FS appear to be more stable and rise later in day, ensure pt is not snacking during the day   -Increase Lantus 54units qhs  -Increase Lispro 18units TID w/ meals with high ISS regimen     #Leukocytosis  #cough  -repeat CBC to check WBC  -Check rapid RVP (Flu/covid/rsv)  -Check CXR  -Check vitals qshift, monitor temps    #2.4cm hepatic lobe hypodensity incidental on CT abdomen  -outpatient MRI abdomen     Management per primary psych team

## 2024-12-23 NOTE — BH INPATIENT PSYCHIATRY PROGRESS NOTE - NSBHCHARTREVIEWVS_PSY_A_CORE FT
Vital Signs Last 24 Hrs  T(C): 36.6 (12-23-24 @ 10:12), Max: 36.8 (12-22-24 @ 13:23)  T(F): 97.9 (12-23-24 @ 10:12), Max: 98.2 (12-22-24 @ 13:23)  HR: 116 (12-23-24 @ 10:12) (103 - 116)  BP: 122/64 (12-23-24 @ 10:12) (111/73 - 122/64)  BP(mean): --  RR: 17 (12-22-24 @ 13:23) (17 - 17)  SpO2: 99% (12-22-24 @ 13:23) (99% - 99%)     Vital Signs Last 24 Hrs  T(C): 36.6 (12-23-24 @ 10:12), Max: 36.6 (12-23-24 @ 10:12)  T(F): 97.9 (12-23-24 @ 10:12), Max: 97.9 (12-23-24 @ 10:12)  HR: 116 (12-23-24 @ 10:12) (116 - 116)  BP: 122/64 (12-23-24 @ 10:12) (122/64 - 122/64)  BP(mean): --  RR: --  SpO2: --     Vital Signs Last 24 Hrs  T(C): 37 (12-24-24 @ 07:40), Max: 39.4 (12-23-24 @ 16:23)  T(F): 98.6 (12-24-24 @ 07:40), Max: 102.9 (12-23-24 @ 16:23)  HR: 108 (12-24-24 @ 07:40) (99 - 112)  BP: 107/65 (12-24-24 @ 07:40) (96/57 - 117/68)  BP(mean): --  RR: --  SpO2: 94% (12-23-24 @ 20:30) (94% - 96%)

## 2024-12-23 NOTE — BH INPATIENT PSYCHIATRY PROGRESS NOTE - NSBHMETABOLIC_PSY_ALL_CORE_FT
BMI: BMI (kg/m2): 32.1 (12-22-24 @ 13:23)  HbA1c: A1C with Estimated Average Glucose Result: 10.0 % (11-08-24 @ 06:35)    Glucose: POCT Blood Glucose.: 287 mg/dL (12-23-24 @ 11:36)    BP: 122/64 (12-23-24 @ 10:12) (109/63 - 145/86)Vital Signs Last 24 Hrs  T(C): 36.6 (12-23-24 @ 10:12), Max: 36.8 (12-22-24 @ 13:23)  T(F): 97.9 (12-23-24 @ 10:12), Max: 98.2 (12-22-24 @ 13:23)  HR: 116 (12-23-24 @ 10:12) (103 - 116)  BP: 122/64 (12-23-24 @ 10:12) (111/73 - 122/64)  BP(mean): --  RR: 17 (12-22-24 @ 13:23) (17 - 17)  SpO2: 99% (12-22-24 @ 13:23) (99% - 99%)      Lipid Panel: Date/Time: 12-23-24 @ 08:53  Cholesterol, Serum: 140  LDL Cholesterol Calculated: 79  HDL Cholesterol, Serum: 41  Total Cholesterol/HDL Ration Measurement: --  Triglycerides, Serum: 99   BMI: BMI (kg/m2): 32.1 (12-22-24 @ 13:23)  HbA1c: A1C with Estimated Average Glucose Result: 9.5 % (12-23-24 @ 08:53)    Glucose: POCT Blood Glucose.: 287 mg/dL (12-23-24 @ 11:36)    BP: 122/64 (12-23-24 @ 10:12) (109/63 - 145/86)Vital Signs Last 24 Hrs  T(C): 36.6 (12-23-24 @ 10:12), Max: 36.6 (12-23-24 @ 10:12)  T(F): 97.9 (12-23-24 @ 10:12), Max: 97.9 (12-23-24 @ 10:12)  HR: 116 (12-23-24 @ 10:12) (116 - 116)  BP: 122/64 (12-23-24 @ 10:12) (122/64 - 122/64)  BP(mean): --  RR: --  SpO2: --      Lipid Panel: Date/Time: 12-23-24 @ 08:53  Cholesterol, Serum: 140  LDL Cholesterol Calculated: 79  HDL Cholesterol, Serum: 41  Total Cholesterol/HDL Ration Measurement: --  Triglycerides, Serum: 99   BMI: BMI (kg/m2): 32.1 (12-22-24 @ 13:23)  HbA1c: A1C with Estimated Average Glucose Result: 9.5 % (12-23-24 @ 08:53)    Glucose: POCT Blood Glucose.: 322 mg/dL (12-24-24 @ 11:12)    BP: 107/65 (12-24-24 @ 07:40) (96/57 - 145/86)Vital Signs Last 24 Hrs  T(C): 37 (12-24-24 @ 07:40), Max: 39.4 (12-23-24 @ 16:23)  T(F): 98.6 (12-24-24 @ 07:40), Max: 102.9 (12-23-24 @ 16:23)  HR: 108 (12-24-24 @ 07:40) (99 - 112)  BP: 107/65 (12-24-24 @ 07:40) (96/57 - 117/68)  BP(mean): --  RR: --  SpO2: 94% (12-23-24 @ 20:30) (94% - 96%)      Lipid Panel: Date/Time: 12-23-24 @ 08:53  Cholesterol, Serum: 140  LDL Cholesterol Calculated: 79  HDL Cholesterol, Serum: 41  Total Cholesterol/HDL Ration Measurement: --  Triglycerides, Serum: 99

## 2024-12-23 NOTE — BH SOCIAL WORK INITIAL PSYCHOSOCIAL EVALUATION - NSBHTREATHX_PSY_ALL_CORE
Problem: Knowledge Deficit - NICU  Goal: Family/caregivers will demonstrate understanding of plan of care, disease process/condition, diagnostic tests, medications and unit policies and procedures  Note: MOB and grandma visited.     Problem: Oxygenation / Respiratory Function  Goal: Mechanical ventilation will promote improved gas exchange and respiratory status  Note: Baby on conv.vent with 18/6,rate of 30 with frequent desaturations most of the time self resolved.     Problem: Pain / Discomfort  Goal: Patient displays alleviation or reduction in pain  Note: Baby with precedex  cont. Infusion and PRN morphine Q 2 hrs for agitation.     Problem: Fluid and Electrolyte Imbalance  Goal: Fluid volume balance will be maintained  Note: PICC in place and infusing well with IV fluids as ordered.     Problem: Nutrition / Feeding  Goal: Patient will maintain balanced nutritional intake  Note: Feeds given on pump over 30 min.for frequent desaturations.   The patient is Unstable - High likelihood or risk of patient condition declining or worsening    Shift Goals  Clinical Goals: Infant will maintain stable vital signs on conventional ventilator, wean FiO2  Patient Goals: n/a  Family Goals: MOB will remain updated on plan of care    Progress made toward(s) clinical / shift goals:  No desaturations.    Patient is not progressing towards the following goals:       Yes...

## 2024-12-23 NOTE — BH INPATIENT PSYCHIATRY PROGRESS NOTE - PRN MEDS
MEDICATIONS  (PRN):  benzonatate 100 milliGRAM(s) Oral three times a day PRN Cough  guaifenesin/dextromethorphan Oral Liquid 10 milliLiter(s) Oral every 6 hours PRN cough  haloperidol     Tablet 5 milliGRAM(s) Oral every 6 hours PRN agiation  hydrOXYzine hydrochloride 50 milliGRAM(s) Oral every 6 hours PRN anxiety and insomnia  LORazepam     Tablet 2 milliGRAM(s) Oral every 6 hours PRN Agitation  methocarbamol 500 milliGRAM(s) Oral every 8 hours PRN Muscle Spasm  naproxen 500 milliGRAM(s) Oral two times a day PRN Moderate Pain (4 - 6)  ondansetron   Disintegrating Tablet 4 milliGRAM(s) Oral every 6 hours PRN Nausea  traZODone 50 milliGRAM(s) Oral at bedtime PRN insomnia   MEDICATIONS  (PRN):  acetaminophen     Tablet .. 650 milliGRAM(s) Oral every 6 hours PRN Temp greater or equal to 38C (100.4F), Mild Pain (1 - 3), Moderate Pain (4 - 6)  benzonatate 100 milliGRAM(s) Oral three times a day PRN Cough  dextrose Oral Gel 15 Gram(s) Oral once PRN Blood Glucose LESS THAN 70 milliGRAM(s)/deciliter  guaifenesin/dextromethorphan Oral Liquid 10 milliLiter(s) Oral every 6 hours PRN cough  haloperidol     Tablet 5 milliGRAM(s) Oral every 6 hours PRN agiation  LORazepam     Tablet 2 milliGRAM(s) Oral every 6 hours PRN Agitation  methocarbamol 500 milliGRAM(s) Oral every 8 hours PRN Muscle Spasm  naproxen 500 milliGRAM(s) Oral two times a day PRN Moderate Pain (4 - 6)  ondansetron   Disintegrating Tablet 4 milliGRAM(s) Oral every 6 hours PRN Nausea  polyethylene glycol 3350 17 Gram(s) Oral every 24 hours PRN constipation  traZODone 50 milliGRAM(s) Oral at bedtime PRN insomnia

## 2024-12-23 NOTE — BH SOCIAL WORK INITIAL PSYCHOSOCIAL EVALUATION - OTHER PAST PSYCHIATRIC HISTORY (INCLUDE DETAILS REGARDING ONSET, COURSE OF ILLNESS, INPATIENT/OUTPATIENT TREATMENT)
Psych history of Borderline Personality traits, MDD. numerous prior psychiatric hospitalizations (most recent last month), previous suicide attempts by ingesting substances like acetone or hydrogen peroxide, currently followed by Dr. Curiel.

## 2024-12-24 LAB
B-OH-BUTYR SERPL-SCNC: 2.6 MMOL/L — HIGH
FLUAV H3 RNA SPEC QL NAA+PROBE: DETECTED
GLUCOSE BLDC GLUCOMTR-MCNC: 190 MG/DL — HIGH (ref 70–99)
GLUCOSE BLDC GLUCOMTR-MCNC: 197 MG/DL — HIGH (ref 70–99)
GLUCOSE BLDC GLUCOMTR-MCNC: 322 MG/DL — HIGH (ref 70–99)
GLUCOSE BLDC GLUCOMTR-MCNC: 357 MG/DL — HIGH (ref 70–99)
RAPID RVP RESULT: DETECTED
SARS-COV-2 RNA SPEC QL NAA+PROBE: SIGNIFICANT CHANGE UP

## 2024-12-24 PROCEDURE — 99231 SBSQ HOSP IP/OBS SF/LOW 25: CPT

## 2024-12-24 RX ORDER — DEXTROSE MONOHYDRATE 25 G/50ML
25 INJECTION, SOLUTION INTRAVENOUS ONCE
Refills: 0 | Status: DISCONTINUED | OUTPATIENT
Start: 2024-12-24 | End: 2024-12-31

## 2024-12-24 RX ORDER — SODIUM CHLORIDE 9 MG/ML
1000 INJECTION, SOLUTION INTRAVENOUS
Refills: 0 | Status: DISCONTINUED | OUTPATIENT
Start: 2024-12-24 | End: 2024-12-31

## 2024-12-24 RX ORDER — GLUCAGON INJECTION, SOLUTION 0.5 MG/.1ML
1 INJECTION, SOLUTION SUBCUTANEOUS ONCE
Refills: 0 | Status: DISCONTINUED | OUTPATIENT
Start: 2024-12-24 | End: 2024-12-31

## 2024-12-24 RX ORDER — DEXTROSE MONOHYDRATE 25 G/50ML
15 INJECTION, SOLUTION INTRAVENOUS ONCE
Refills: 0 | Status: DISCONTINUED | OUTPATIENT
Start: 2024-12-24 | End: 2024-12-31

## 2024-12-24 RX ORDER — INSULIN LISPRO 100/ML
18 VIAL (ML) SUBCUTANEOUS
Refills: 0 | Status: DISCONTINUED | OUTPATIENT
Start: 2024-12-24 | End: 2024-12-26

## 2024-12-24 RX ORDER — INSULIN GLARGINE-YFGN 100 [IU]/ML
54 INJECTION, SOLUTION SUBCUTANEOUS AT BEDTIME
Refills: 0 | Status: DISCONTINUED | OUTPATIENT
Start: 2024-12-24 | End: 2024-12-31

## 2024-12-24 RX ORDER — INSULIN GLARGINE-YFGN 100 [IU]/ML
20 INJECTION, SOLUTION SUBCUTANEOUS EVERY MORNING
Refills: 0 | Status: DISCONTINUED | OUTPATIENT
Start: 2024-12-24 | End: 2024-12-31

## 2024-12-24 RX ORDER — INSULIN GLARGINE-YFGN 100 [IU]/ML
20 INJECTION, SOLUTION SUBCUTANEOUS
Refills: 0 | Status: DISCONTINUED | OUTPATIENT
Start: 2024-12-24 | End: 2024-12-24

## 2024-12-24 RX ORDER — DEXTROSE MONOHYDRATE 25 G/50ML
12.5 INJECTION, SOLUTION INTRAVENOUS ONCE
Refills: 0 | Status: DISCONTINUED | OUTPATIENT
Start: 2024-12-24 | End: 2024-12-31

## 2024-12-24 RX ADMIN — Medication 5 MILLIGRAM(S): at 08:36

## 2024-12-24 RX ADMIN — Medication 25 MILLIGRAM(S): at 13:02

## 2024-12-24 RX ADMIN — Medication 8: at 11:51

## 2024-12-24 RX ADMIN — INSULIN GLARGINE-YFGN 20 UNIT(S): 100 INJECTION, SOLUTION SUBCUTANEOUS at 12:24

## 2024-12-24 RX ADMIN — Medication 25 MILLIGRAM(S): at 08:29

## 2024-12-24 RX ADMIN — Medication 18 UNIT(S): at 16:31

## 2024-12-24 RX ADMIN — Medication 10: at 08:13

## 2024-12-24 RX ADMIN — Medication 17 GRAM(S): at 06:42

## 2024-12-24 RX ADMIN — Medication 15 UNIT(S): at 08:09

## 2024-12-24 RX ADMIN — PANTOPRAZOLE 40 MILLIGRAM(S): 40 TABLET, DELAYED RELEASE ORAL at 06:41

## 2024-12-24 RX ADMIN — Medication 2: at 16:32

## 2024-12-24 RX ADMIN — FAMOTIDINE 40 MILLIGRAM(S): 20 TABLET, FILM COATED ORAL at 20:04

## 2024-12-24 RX ADMIN — SERTRALINE HYDROCHLORIDE 100 MILLIGRAM(S): 25 TABLET ORAL at 08:32

## 2024-12-24 RX ADMIN — ARIPIPRAZOLE 2 MILLIGRAM(S): 10 TABLET ORAL at 08:33

## 2024-12-24 RX ADMIN — Medication 18 UNIT(S): at 11:51

## 2024-12-24 RX ADMIN — INSULIN GLARGINE-YFGN 54 UNIT(S): 100 INJECTION, SOLUTION SUBCUTANEOUS at 20:04

## 2024-12-24 RX ADMIN — Medication 100 MILLIGRAM(S): at 17:03

## 2024-12-24 RX ADMIN — Medication 25 MILLIGRAM(S): at 20:01

## 2024-12-24 NOTE — BH INPATIENT PSYCHIATRY PROGRESS NOTE - NSBHFUPINTERVALHXFT_PSY_A_CORE
Patient seen and evaluated 12/23/24. As per nursing report elevated blood sugar last night, SOB, cough. Seen by PA and hospitalist. Placed on isolation precautions. On approach patient calm and cooperative. No agitation or aggression noted. Patient expresses a better mood today. Only complaint is the cough.  Zoloft recently increased and Abilify recently added to augment Zoloft. Denies any side effects/adverse reactions. No side effects/adverse reactions noted. Will continue to monitor and titrate accordingly. Patient denies any suicidal/homicidal ideations. Patient denies any A/V hallucinations. No evidence of psychosis noted.     · Assessment	  #DM, uncontrolled  -FS trend noted, pt endorses not being compliant with her insulin regimen, which is now restarted  -In light of FS still uncontrolled and minor anion gap on BMP, repeat BMP and check beta-hydroxy level, contact ICU if beta-hydroxy elevated, may need insulin gtt   -A1C 9.5  -Get endocrine eval   -Morning FS appear to be more stable and rise later in day, ensure pt is not snacking during the day   -Increase Lantus 54units qhs  -Increase Lispro 18units TID w/ meals with high ISS regimen     #Leukocytosis  #cough  -repeat CBC to check WBC  -Check rapid RVP (Flu/covid/rsv)  -Check CXR  -Check vitals qshift, monitor temps    #2.4cm hepatic lobe hypodensity incidental on CT abdomen  -outpatient MRI abdomen   Patient seen and evaluated 12/23/24. As per nursing report elevated blood sugar last night, SOB, cough. Seen by PA and hospitalist. Placed on isolation precautions. On approach patient calm and cooperative. No agitation or aggression noted. Patient expresses a better mood today. Only complaint is the cough.  Zoloft recently increased and Abilify recently added to augment Zoloft. Denies any side effects/adverse reactions. No side effects/adverse reactions noted. Will continue to monitor and titrate accordingly. Patient denies any suicidal/homicidal ideations. Patient denies any A/V hallucinations. No evidence of psychosis noted.     Per endocrine: obesity, type 1 diabetes, uses a medtronic insulin pump at home, but glucose control has always been very poor, very poor insight to her condition, and non compliance/lack of motivation, does not bolus with meals. weight gain and hunger due to multiple psych meds that promote weight gain, and insulin resistance, try and stop hydroxyzine if clinically feasible, as well as minimize psych meds that promote weight gain, and insulin resistance. in the meantime, stop all fruit juices, 1000 kcal diet, try and promote 0.5 kg. weight loss per week, add glargine 20 units in AM, continue  evening glargine, at discharge will follow up with dr mcbride.

## 2024-12-24 NOTE — CONSULT NOTE ADULT - SUBJECTIVE AND OBJECTIVE BOX
CRISTI MONTGOMERY 39y Female  MRN#: [id number]     Hospital Day: 1d      SUBJECTIVE  Pt complaining of mild cough.                                          ----------------------------------------------------------  OBJECTIVE  PAST MEDICAL & SURGICAL HISTORY  Neuropathy  right lower extremity, vaginal    Type 1 diabetes    Degenerative disc disease, thoracic    Urinary tract infection, recurrent    Gastroesophageal reflux disease, esophagitis presence not specified    Intractable headache, unspecified chronicity pattern, unspecified headache type    Tremor of right hand    Tachycardia    Spinal stenosis    No significant past surgical history                                              -----------------------------------------------------------  ALLERGIES:  Fluad 0.5 ML ODETTE (Unknown)  penicillins (Hives)  Ceclor (Rash)  amoxicillin (Hives)  clindamycin (Hives; Nephrotoxicity)  Clindamycin Phosphate (Unknown)                                            ------------------------------------------------------------    HOME MEDICATIONS  Home Medications:  famotidine 40 mg oral tablet: 1 tab(s) orally once a day (at bedtime) (10 Dec 2024 09:54)  insulin glargine 100 units/mL subcutaneous solution: 50 unit(s) subcutaneous once a day (at bedtime) (10 Dec 2024 09:54)  insulin lispro 100 units/mL injectable solution: 15 unit(s) subcutaneous 3 times a day (with meals) (10 Dec 2024 09:54)  methocarbamol 500 mg oral tablet: 2 tab(s) orally every 8 hours As needed back pain (10 Dec 2024 09:54)  metoprolol tartrate 25 mg oral tablet: 1 tab(s) orally 3 times a day (10 Dec 2024 09:54)  naproxen 500 mg oral tablet: 1 tab(s) orally every 12 hours As needed pain (10 Dec 2024 09:54)  Norvasc 5 mg oral tablet: 1 tab(s) orally once a day (07 Nov 2024 22:03)  omeprazole 40 mg oral delayed release capsule: 1 cap(s) orally once a day (04 Dec 2024 09:08)                           MEDICATIONS:  STANDING MEDICATIONS  amLODIPine   Tablet 5 milliGRAM(s) Oral daily  ARIPiprazole 2 milliGRAM(s) Oral daily  famotidine    Tablet 40 milliGRAM(s) Oral at bedtime  insulin glargine Injectable (LANTUS) 50 Unit(s) SubCutaneous at bedtime  insulin lispro (ADMELOG) corrective regimen sliding scale   SubCutaneous three times a day before meals  insulin lispro Injectable (ADMELOG) 15 Unit(s) SubCutaneous three times a day with meals  metoprolol tartrate 25 milliGRAM(s) Oral three times a day  sertraline 100 milliGRAM(s) Oral daily    PRN MEDICATIONS  acetaminophen     Tablet .. 650 milliGRAM(s) Oral every 6 hours PRN  benzonatate 100 milliGRAM(s) Oral three times a day PRN  guaifenesin/dextromethorphan Oral Liquid 10 milliLiter(s) Oral every 6 hours PRN  haloperidol     Tablet 5 milliGRAM(s) Oral every 6 hours PRN  hydrOXYzine hydrochloride 50 milliGRAM(s) Oral every 6 hours PRN  LORazepam     Tablet 2 milliGRAM(s) Oral every 6 hours PRN  methocarbamol 500 milliGRAM(s) Oral every 8 hours PRN  naproxen 500 milliGRAM(s) Oral two times a day PRN  ondansetron   Disintegrating Tablet 4 milliGRAM(s) Oral every 6 hours PRN  traZODone 50 milliGRAM(s) Oral at bedtime PRN                                            ------------------------------------------------------------  VITAL SIGNS: Last 24 Hours  T(C): 37.9 (23 Dec 2024 18:06), Max: 39.4 (23 Dec 2024 16:23)  T(F): 100.2 (23 Dec 2024 18:06), Max: 102.9 (23 Dec 2024 16:23)  HR: 112 (23 Dec 2024 16:23) (112 - 116)  BP: 96/57 (23 Dec 2024 16:23) (96/57 - 122/64)  BP(mean): --  RR: --  SpO2: 96% (23 Dec 2024 17:00) (96% - 96%)                                             --------------------------------------------------------------  LABS:                        9.2    12.03 )-----------( 509      ( 23 Dec 2024 08:53 )             29.9     12-23    132[L]  |  98  |  10  ----------------------------<  375[H]  4.8   |  19  |  0.7    Ca    8.7      23 Dec 2024 08:53    TPro  6.4  /  Alb  3.9  /  TBili  <0.2  /  DBili  x   /  AST  34  /  ALT  54[H]  /  AlkPhos  130[H]  12-23      Urinalysis Basic - ( 23 Dec 2024 08:53 )    Color: x / Appearance: x / SG: x / pH: x  Gluc: 375 mg/dL / Ketone: x  / Bili: x / Urobili: x   Blood: x / Protein: x / Nitrite: x   Leuk Esterase: x / RBC: x / WBC x   Sq Epi: x / Non Sq Epi: x / Bacteria: x                                                            -------------------------------------------------------------  RADIOLOGY:  CXR  Xray Chest 1 View- PORTABLE-Urgent:   ACC: [id number] EXAM:  XR CHEST PORTABLE URGENT 1V   ORDERED BY: JANUSZ KHAN     PROCEDURE DATE:  12/21/2024          INTERPRETATION:  Clinical History / Reason for exam: Chest pain    Comparison : Chest radiograph: 11/7/2024    Technique/Positioning: Frontal view of the chest    Findings:    Support devices: None.    Cardiac/mediastinum/hilum: The cardiac silhouette is normal in size.    Skeleton/soft tissues: No evidence of acute osseous abnormality.    Lung parenchyma/Pleura: There is noevidence of focal consolidation,   pleural effusion or pneumothorax.    Impression:  No evidence of focal consolidation, pleural effusion or pneumothorax.            --- End of Report ---            JOANNA VIERA MD; Attending Radiologist  This document has been electronically signed. Dec 22 2024 12:03AM (12-21-24 @ 21:13)      CT  CT Abdomen and Pelvis w/ IV Cont:   ACC: [id number] EXAM:  CT ABDOMEN AND PELVIS IC   ORDERED BY: JANUSZ KHAN     PROCEDURE DATE:  12/22/2024          INTERPRETATION:  CLINICAL INFORMATION: Diffuse abdominal pain    COMPARISON: CT abdomen pelvis 11/20/2024.    CONTRAST/COMPLICATIONS:  IV Contrast: Omnipaque 350  100 cc administered   0 cc discarded  Oral Contrast: NONE  .    PROCEDURE:  CT of the Abdomen and Pelvis was performed.  Sagittal and coronal reformats were performed.    FINDINGS:  LOWER CHEST: Within normal limits.    LIVER: Diffuse hepatic steatosis. Stable indeterminate 2.4 cm left   hepatic lobe hypodensity.  BILE DUCTS: Normal caliber.  GALLBLADDER: Within normal limits.  SPLEEN: Within normal limits.  PANCREAS: Within normal limits.  ADRENALS: Within normallimits.  KIDNEYS/URETERS: Within normal limits.    BLADDER: Within normal limits.  REPRODUCTIVE ORGANS: Unremarkable.    BOWEL: No bowel obstruction. Appendix is normal.  PERITONEUM/RETROPERITONEUM: Within normal limits.  VESSELS: Within normal limits.  LYMPH NODES: No lymphadenopathy.  ABDOMINAL WALL: Small fat-containing umbilical hernia.  BONES: Mild degenerative changes.    IMPRESSION:  No CT evidence of acute abnormality.    Stable indeterminate 2.4 cm left hepatic lobe hypodensity. This is not   distinctly identified on the CT from April 2024. Outpatient MRI suggested   for further evaluation is recommended.    --- End of Report ---          JAX VIZCAINO MD; Resident Radiologist  This document has been electronically signed.  JAYE GRANADOS MD; Attending Radiologist  This document has been electronically signed. Dec 22 2024  6:20AM (12-22-24 @ 03:35)                                            --------------------------------------------------------------    PHYSICAL EXAM:  GENERAL: NAD, lying in bed comfortably  CHEST/LUNG: Clear to auscultation bilaterally; No rales, rhonchi, wheezing, or rubs. Unlabored respirations  HEART: Regular rate and rhythm; No murmurs, rubs, or gallops  ABDOMEN: Soft, nontender, nondistended  EXTREMITIES:  No clubbing, cyanosis, or edema  NERVOUS SYSTEM:  A&Ox3                                               --------------------------------------------------------------      
Reason for Endocrinology Consult: Diabetes    HPI: 39y Female    Diabetes Type:    PAST MEDICAL & SURGICAL HISTORY:  Neuropathy  right lower extremity, vaginal      Type 1 diabetes      Degenerative disc disease, thoracic      Urinary tract infection, recurrent      Gastroesophageal reflux disease, esophagitis presence not specified      Intractable headache, unspecified chronicity pattern, unspecified headache type      Tremor of right hand      Tachycardia      Spinal stenosis      No significant past surgical history        FAMILY HISTORY:      SH:  Smoking  Etoh:  Recreational Drugs:    Home Medications:  famotidine 40 mg oral tablet: 1 tab(s) orally once a day (at bedtime) (10 Dec 2024 09:54)  insulin glargine 100 units/mL subcutaneous solution: 50 unit(s) subcutaneous once a day (at bedtime) (10 Dec 2024 09:54)  insulin lispro 100 units/mL injectable solution: 15 unit(s) subcutaneous 3 times a day (with meals) (10 Dec 2024 09:54)  methocarbamol 500 mg oral tablet: 2 tab(s) orally every 8 hours As needed back pain (10 Dec 2024 09:54)  metoprolol tartrate 25 mg oral tablet: 1 tab(s) orally 3 times a day (10 Dec 2024 09:54)  naproxen 500 mg oral tablet: 1 tab(s) orally every 12 hours As needed pain (10 Dec 2024 09:54)  Norvasc 5 mg oral tablet: 1 tab(s) orally once a day (07 Nov 2024 22:03)  omeprazole 40 mg oral delayed release capsule: 1 cap(s) orally once a day (04 Dec 2024 09:08)      Current (Non-Endocrine) Meds:  acetaminophen     Tablet .. 650 milliGRAM(s) Oral every 6 hours PRN  amLODIPine   Tablet 5 milliGRAM(s) Oral daily  ARIPiprazole 2 milliGRAM(s) Oral daily  benzonatate 100 milliGRAM(s) Oral three times a day PRN  famotidine    Tablet 40 milliGRAM(s) Oral at bedtime  guaifenesin/dextromethorphan Oral Liquid 10 milliLiter(s) Oral every 6 hours PRN  haloperidol     Tablet 5 milliGRAM(s) Oral every 6 hours PRN  hydrOXYzine hydrochloride 50 milliGRAM(s) Oral every 6 hours PRN  LORazepam     Tablet 2 milliGRAM(s) Oral every 6 hours PRN  methocarbamol 500 milliGRAM(s) Oral every 8 hours PRN  metoprolol tartrate 25 milliGRAM(s) Oral three times a day  naproxen 500 milliGRAM(s) Oral two times a day PRN  ondansetron   Disintegrating Tablet 4 milliGRAM(s) Oral every 6 hours PRN  pantoprazole    Tablet 40 milliGRAM(s) Oral before breakfast  polyethylene glycol 3350 17 Gram(s) Oral every 24 hours PRN  sertraline 100 milliGRAM(s) Oral daily  traZODone 50 milliGRAM(s) Oral at bedtime PRN      Current Endocrine Meds:   insulin glargine Injectable (LANTUS) 54 Unit(s) SubCutaneous at bedtime  insulin lispro (ADMELOG) corrective regimen sliding scale   SubCutaneous three times a day before meals  insulin lispro Injectable (ADMELOG) 18 Unit(s) SubCutaneous three times a day with meals      Allergies:  Fluad 0.5 ML ODETTE (Unknown)  Influenza Virus Vaccine, H5N1, Inactivated (Other)  penicillins (Hives)  Ceclor (Rash)  Cipro (Other)  amoxicillin (Hives)  clindamycin (Hives; Nephrotoxicity)  Clindamycin Phosphate (Unknown)  gabapentin (Other)      ROS:  Denies the following except as indicated.    General: weight loss/weight gain, decreased appetite, fatigue, fever  Eyes: blurry vision, double vision  ENT: neck swelling, dysphagia, voice changes   CV: palpitations, SOB, chest pain, cough  GI: nausea, vomiting, diarrhea, constipation, abdominal pain  : nocturia,  polyuria, dysuria  Endo: decreased libido, heat/cold intolerance, jitteriness  MSK: arthralgias, myalgias  Skin: rash, dryness, diaphoresis  Neuro: pedal numbness,pedal paresthesias, pedal pain    Height (cm): 162.6 (12-22 @ 13:23), 162.6 (12-19 @ 11:48), 162.6 (12-18 @ 19:26)  Weight (kg): 85 (12-22 @ 13:23), 90.7 (12-18 @ 19:32)  BMI (kg/m2): 32.1 (12-22 @ 13:23), 34.3 (12-19 @ 11:48), 34.3 (12-18 @ 19:32)    Vital Signs Last 24 Hrs  T(C): 37.6 (23 Dec 2024 20:30), Max: 39.4 (23 Dec 2024 16:23)  T(F): 99.6 (23 Dec 2024 20:30), Max: 102.9 (23 Dec 2024 16:23)  HR: 99 (23 Dec 2024 20:30) (99 - 116)  BP: 117/68 (23 Dec 2024 20:30) (96/57 - 122/64)  BP(mean): --  RR: --  SpO2: 94% (23 Dec 2024 20:30) (94% - 96%)    Parameters below as of 23 Dec 2024 20:30  Patient On (Oxygen Delivery Method): room air      Constitutional: WN/WD in NAD.   Neck: no thyromegaly or palpable thyroid nodules   Respiratory: lungs CTAB.  Cardiovascular: regular rate and rhythm, normal S1 and S2, no audible murmurs  GI: soft, NT/ND, no masses/HSM appreciated.  Ext: no edema, no ulcers, pedal pulses palpable bilaterally  Neurology: no tremor, monofilament sensation intact in feet  Psychiatric: A&O x 3, normal affect/mood.        LABS:                        9.2    12.03 )-----------( 509      ( 23 Dec 2024 08:53 )             29.9     12-23    132[L]  |  98  |  10  ----------------------------<  375[H]  4.8   |  19  |  0.7    Ca    8.7      23 Dec 2024 08:53    TPro  6.4  /  Alb  3.9  /  TBili  <0.2  /  DBili  x   /  AST  34  /  ALT  54[H]  /  AlkPhos  130[H]  12-23      Urinalysis Basic - ( 23 Dec 2024 08:53 )    Color: x / Appearance: x / SG: x / pH: x  Gluc: 375 mg/dL / Ketone: x  / Bili: x / Urobili: x   Blood: x / Protein: x / Nitrite: x   Leuk Esterase: x / RBC: x / WBC x   Sq Epi: x / Non Sq Epi: x / Bacteria: x                             Thyroid Stimulating Hormone, Serum: 0.66 (12-23 @ 08:53)          RADIOLOGY & ADDITIONAL STUDIES:    A/P:39yFemale    1
patient noted to have firm, distended bowel on exam today  -possible constipation 2/2 urinary retention, FC placed  -will obtain CT ab/pelvis with PO and IV contrast

## 2024-12-24 NOTE — BH INPATIENT PSYCHIATRY PROGRESS NOTE - NSBHASSESSSUMMFT_PSY_ALL_CORE
Patient is a 39 year old woman with a history of Major Depression, Anxiety and Borderline Personality Disorder who presented to the ED for the evaluation of worsening depression and suicidal ideations.   Patient continues to have significant depression, hopelessness, helplessness, anhedonia and continued suicidal ideations. It seems that her feelings of depression are also confounded with her feelings of dysphoria in the context of bereavement and lack of skills to cope with the loss of her father. Patient seems to have significant poor frustration tolerance and very poor impulse control.   At this time, patient is considered a danger to herself and needs inpatient psychiatric hospitalization for medication management and symptom stabilization.      Patient seen and evaluated 12/23/24. As per nursing report elevated blood sugar last night, SOB, cough. Seen by PA and hospitalist. Placed on isolation precautions. On approach patient calm and cooperative. No agitation or aggression noted. Patient expresses a better mood today. Only complaint is the cough.  Zoloft recently increased and Abilify recently added to augment Zoloft. Denies any side effects/adverse reactions. No side effects/adverse reactions noted. Will continue to monitor and titrate accordingly. Patient denies any suicidal/homicidal ideations. Patient denies any A/V hallucinations. No evidence of psychosis noted.     · Assessment	  #DM, uncontrolled  -FS trend noted, pt endorses not being compliant with her insulin regimen, which is now restarted  -In light of FS still uncontrolled and minor anion gap on BMP, repeat BMP and check beta-hydroxy level, contact ICU if beta-hydroxy elevated, may need insulin gtt   -A1C 9.5  -Get endocrine eval   -Morning FS appear to be more stable and rise later in day, ensure pt is not snacking during the day   -Increase Lantus 54units qhs  -Increase Lispro 18units TID w/ meals with high ISS regimen     #Leukocytosis  #cough  -repeat CBC to check WBC  -Check rapid RVP (Flu/covid/rsv)  -Check CXR  -Check vitals qshift, monitor temps    #2.4cm hepatic lobe hypodensity incidental on CT abdomen  -outpatient MRI abdomen    #Major Depressive Disorder  -Increase Zoloft to 100mg po od  -Augment with Abilify 2mg po od    #Sleep  -Trazodone 50mg po qhs PRN for insomnia    #HTN #GERD  #DM  amLODIPine   Tablet 5 milliGRAM(s) Oral daily  Matoprolol Tartrate 25mg TID  famotidine    Tablet 20 milliGRAM(s) Oral at bedtime  Pantoprazole 40mg daily  insulin glargine Injectable (LANTUS) 54 Unit(s) SubCutaneous at bedtime  Isulin lispro 18units TID with meals  - carb consistent diet   - hospitalist consult for glucose management     #PRNs  -Haldol 5mg po q6h PRN for agitation  -Ativan 2mg po q6h PRN for agitation, severe anxiety  -Benadryl 50mg po q6h PRN for agitation, insomnia, anxiety  -Robaxin PRN for pain  -Robitussin PRN and Tessalon Pearls PRN for cough       Patient is a 39 year old woman with a history of Major Depression, Anxiety and Borderline Personality Disorder who presented to the ED for the evaluation of worsening depression and suicidal ideations.   Patient continues to have significant depression, hopelessness, helplessness, anhedonia and continued suicidal ideations. It seems that her feelings of depression are also confounded with her feelings of dysphoria in the context of bereavement and lack of skills to cope with the loss of her father. Patient seems to have significant poor frustration tolerance and very poor impulse control.   At this time, patient is considered a danger to herself and needs inpatient psychiatric hospitalization for medication management and symptom stabilization.      Patient seen and evaluated 12/23/24. As per nursing report elevated blood sugar last night, SOB, cough. Seen by PA and hospitalist. Placed on isolation precautions. On approach patient calm and cooperative. No agitation or aggression noted. Patient expresses a better mood today. Only complaint is the cough.  Zoloft recently increased and Abilify recently added to augment Zoloft. Denies any side effects/adverse reactions. No side effects/adverse reactions noted. Will continue to monitor and titrate accordingly. Patient denies any suicidal/homicidal ideations. Patient denies any A/V hallucinations. No evidence of psychosis noted.     Per endocrine: obesity, type 1 diabetes, uses a medtronic insulin pump at home, but glucose control has always been very poor, very poor insight to her condition, and non compliance/lack of motivation, does not bolus with meals. weight gain and hunger due to multiple psych meds that promote weight gain, and insulin resistance, try and stop hydroxyzine if clinically feasible, as well as minimize psych meds that promote weight gain, and insulin resistance. in the meantime, stop all fruit juices, 1000 kcal diet, try and promote 0.5 kg. weight loss per week, add glargine 20 units in AM, continue  evening glargine, at discharge will follow up with dr mcbride.    #Major Depressive Disorder  -Increase Zoloft to 100mg po od  -Augment with Abilify 2mg po od    #Sleep  -Trazodone 50mg po qhs PRN for insomnia    #HTN #GERD  #DM  amLODIPine   Tablet 5 milliGRAM(s) Oral daily  Matoprolol Tartrate 25mg TID  famotidine    Tablet 20 milliGRAM(s) Oral at bedtime  Pantoprazole 40mg daily  insulin glargine Injectable (LANTUS) 54 Unit(s) SubCutaneous at bedtime  Isulin lispro 18units TID with meals  -glargine 20 units in AM  - carb consistent diet   - hospitalist consult for glucose management     #PRNs  -Haldol 5mg po q6h PRN for agitation  -Ativan 2mg po q6h PRN for agitation, severe anxiety  -Benadryl 50mg po q6h PRN for agitation, insomnia, anxiety  -Robaxin PRN for pain  -Robitussin PRN and Tessalon Pearls PRN for cough       Patient is a 39 year old woman with a history of Major Depression, Anxiety and Borderline Personality Disorder who presented to the ED for the evaluation of worsening depression and suicidal ideations.   Patient continues to have significant depression, hopelessness, helplessness, anhedonia and continued suicidal ideations. It seems that her feelings of depression are also confounded with her feelings of dysphoria in the context of bereavement and lack of skills to cope with the loss of her father. Patient seems to have significant poor frustration tolerance and very poor impulse control.   At this time, patient is considered a danger to herself and needs inpatient psychiatric hospitalization for medication management and symptom stabilization.      Patient seen and evaluated 12/23/24. As per nursing report elevated blood sugar last night, SOB, cough. Seen by PA and hospitalist. Placed on isolation precautions. On approach patient calm and cooperative. No agitation or aggression noted. Patient expresses a better mood today. Only complaint is the cough.  Zoloft recently increased and Abilify recently added to augment Zoloft. Denies any side effects/adverse reactions. No side effects/adverse reactions noted. Will continue to monitor and titrate accordingly. Patient denies any suicidal/homicidal ideations. Patient denies any A/V hallucinations. No evidence of psychosis noted.     Per endocrine: obesity, type 1 diabetes, uses a medtronic insulin pump at home, but glucose control has always been very poor, very poor insight to her condition, and non compliance/lack of motivation, does not bolus with meals. weight gain and hunger due to multiple psych meds that promote weight gain, and insulin resistance, try and stop hydroxyzine if clinically feasible, as well as minimize psych meds that promote weight gain, and insulin resistance. in the meantime, stop all fruit juices, 1000 kcal diet, try and promote 0.5 kg. weight loss per week, add glargine 20 units in AM, continue  evening glargine, at discharge will follow up with dr mcbride.    #Major Depressive Disorder  -Increase Zoloft to 100mg po od  -Augment with Abilify 2mg po od d/c 12/24 per endocrine    #Sleep  -Trazodone 50mg po qhs PRN for insomnia    #HTN #GERD  #DM  amLODIPine   Tablet 5 milliGRAM(s) Oral daily  Matoprolol Tartrate 25mg TID  famotidine    Tablet 20 milliGRAM(s) Oral at bedtime  Pantoprazole 40mg daily  insulin glargine Injectable (LANTUS) 54 Unit(s) SubCutaneous at bedtime  Isulin lispro 18units TID with meals  -glargine 20 units in AM  - carb consistent diet   - hospitalist consult for glucose management     #PRNs  -Haldol 5mg po q6h PRN for agitation  -Ativan 2mg po q6h PRN for agitation, severe anxiety  -Benadryl 50mg po q6h PRN for agitation, insomnia, anxiety  -Robaxin PRN for pain  -Robitussin PRN and Tessalon Pearls PRN for cough

## 2024-12-24 NOTE — CONSULT NOTE ADULT - ASSESSMENT
obesity, type 1 diabetes, uses a medtronic insulin pump at home, but glucose control has always been very poor, very poor insight to her condition, and non compliance/lack of motivation, does not bolus with meals. weight gain and hunger due to multiple psych meds that promote weight gain, and insulin resistance, try and stop hydroxyzine if clinically feasible, as well as minimize psych meds that promote weight gain, and insulin resistance. in the meantime, stop all fruit juices, 1000 kcal diet, try and promote 0.5 kg. weight loss per week, add glargine 20 units in AM, continue  evening glargine, at discharge will follow up with dr mcbride.

## 2024-12-24 NOTE — BH INPATIENT PSYCHIATRY PROGRESS NOTE - NSBHMETABOLIC_PSY_ALL_CORE_FT
BMI: BMI (kg/m2): 32.1 (12-22-24 @ 13:23)  HbA1c: A1C with Estimated Average Glucose Result: 9.5 % (12-23-24 @ 08:53)    Glucose: POCT Blood Glucose.: 357 mg/dL (12-24-24 @ 06:43)    BP: 117/68 (12-23-24 @ 20:30) (96/57 - 145/86)Vital Signs Last 24 Hrs  T(C): 37.6 (12-23-24 @ 20:30), Max: 39.4 (12-23-24 @ 16:23)  T(F): 99.6 (12-23-24 @ 20:30), Max: 102.9 (12-23-24 @ 16:23)  HR: 99 (12-23-24 @ 20:30) (99 - 116)  BP: 117/68 (12-23-24 @ 20:30) (96/57 - 122/64)  BP(mean): --  RR: --  SpO2: 94% (12-23-24 @ 20:30) (94% - 96%)      Lipid Panel: Date/Time: 12-23-24 @ 08:53  Cholesterol, Serum: 140  LDL Cholesterol Calculated: 79  HDL Cholesterol, Serum: 41  Total Cholesterol/HDL Ration Measurement: --  Triglycerides, Serum: 99   BMI: BMI (kg/m2): 32.1 (12-22-24 @ 13:23)  HbA1c: A1C with Estimated Average Glucose Result: 9.5 % (12-23-24 @ 08:53)    Glucose: POCT Blood Glucose.: 322 mg/dL (12-24-24 @ 11:12)    BP: 107/65 (12-24-24 @ 07:40) (96/57 - 145/86)Vital Signs Last 24 Hrs  T(C): 37 (12-24-24 @ 07:40), Max: 39.4 (12-23-24 @ 16:23)  T(F): 98.6 (12-24-24 @ 07:40), Max: 102.9 (12-23-24 @ 16:23)  HR: 108 (12-24-24 @ 07:40) (99 - 112)  BP: 107/65 (12-24-24 @ 07:40) (96/57 - 117/68)  BP(mean): --  RR: --  SpO2: 94% (12-23-24 @ 20:30) (94% - 96%)      Lipid Panel: Date/Time: 12-23-24 @ 08:53  Cholesterol, Serum: 140  LDL Cholesterol Calculated: 79  HDL Cholesterol, Serum: 41  Total Cholesterol/HDL Ration Measurement: --  Triglycerides, Serum: 99

## 2024-12-24 NOTE — BH INPATIENT PSYCHIATRY PROGRESS NOTE - PRN MEDS
MEDICATIONS  (PRN):  acetaminophen     Tablet .. 650 milliGRAM(s) Oral every 6 hours PRN Temp greater or equal to 38C (100.4F), Mild Pain (1 - 3), Moderate Pain (4 - 6)  benzonatate 100 milliGRAM(s) Oral three times a day PRN Cough  guaifenesin/dextromethorphan Oral Liquid 10 milliLiter(s) Oral every 6 hours PRN cough  haloperidol     Tablet 5 milliGRAM(s) Oral every 6 hours PRN agiation  hydrOXYzine hydrochloride 50 milliGRAM(s) Oral every 6 hours PRN anxiety and insomnia  LORazepam     Tablet 2 milliGRAM(s) Oral every 6 hours PRN Agitation  methocarbamol 500 milliGRAM(s) Oral every 8 hours PRN Muscle Spasm  naproxen 500 milliGRAM(s) Oral two times a day PRN Moderate Pain (4 - 6)  ondansetron   Disintegrating Tablet 4 milliGRAM(s) Oral every 6 hours PRN Nausea  polyethylene glycol 3350 17 Gram(s) Oral every 24 hours PRN constipation  traZODone 50 milliGRAM(s) Oral at bedtime PRN insomnia   MEDICATIONS  (PRN):  acetaminophen     Tablet .. 650 milliGRAM(s) Oral every 6 hours PRN Temp greater or equal to 38C (100.4F), Mild Pain (1 - 3), Moderate Pain (4 - 6)  benzonatate 100 milliGRAM(s) Oral three times a day PRN Cough  dextrose Oral Gel 15 Gram(s) Oral once PRN Blood Glucose LESS THAN 70 milliGRAM(s)/deciliter  guaifenesin/dextromethorphan Oral Liquid 10 milliLiter(s) Oral every 6 hours PRN cough  haloperidol     Tablet 5 milliGRAM(s) Oral every 6 hours PRN agiation  LORazepam     Tablet 2 milliGRAM(s) Oral every 6 hours PRN Agitation  methocarbamol 500 milliGRAM(s) Oral every 8 hours PRN Muscle Spasm  naproxen 500 milliGRAM(s) Oral two times a day PRN Moderate Pain (4 - 6)  ondansetron   Disintegrating Tablet 4 milliGRAM(s) Oral every 6 hours PRN Nausea  polyethylene glycol 3350 17 Gram(s) Oral every 24 hours PRN constipation  traZODone 50 milliGRAM(s) Oral at bedtime PRN insomnia

## 2024-12-24 NOTE — BH INPATIENT PSYCHIATRY PROGRESS NOTE - ATTENDING COMMENTS
Interviewed patient with NOLA Hua. Patient reports depressed mood around holidays, currently denies SI. Reprots sleeping well last night. Agree with the assessment and plan.

## 2024-12-24 NOTE — BH TREATMENT PLAN - NSTXPLANTHERAPYSESSIONSFT_PSY_ALL_CORE
12-23-24  Type of therapy: Coping skills, Creative arts therapy, Inspiration and motiviation, Leisure development, Self esteem, Social skills training, Stress management, Symptom management  Type of session: Group  Level of patient participation: Participates  Duration of participation: 30 minutes  Therapy conducted by: Psych rehab  Therapy Summary: Pt is familiar with unit and RT sessions , pt usally enjoys music and creative arts , pt is now visable on the unit and using the phone .    12-23-24  Type of therapy: Stress management  Type of session: Group  --  --  Therapy conducted by: Social work  Therapy Summary:  encouraged Ania to attend the social work group today with peers (stress management skills were reviewed). The benefits of attending groups were discussed. Patient declined group attendance. She is encouraged to attend future sessions.

## 2024-12-24 NOTE — BH INPATIENT PSYCHIATRY PROGRESS NOTE - CURRENT MEDICATION
MEDICATIONS  (STANDING):  amLODIPine   Tablet 5 milliGRAM(s) Oral daily  ARIPiprazole 2 milliGRAM(s) Oral daily  famotidine    Tablet 40 milliGRAM(s) Oral at bedtime  insulin glargine Injectable (LANTUS) 54 Unit(s) SubCutaneous at bedtime  insulin lispro (ADMELOG) corrective regimen sliding scale   SubCutaneous three times a day before meals  insulin lispro Injectable (ADMELOG) 18 Unit(s) SubCutaneous three times a day with meals  metoprolol tartrate 25 milliGRAM(s) Oral three times a day  pantoprazole    Tablet 40 milliGRAM(s) Oral before breakfast  sertraline 100 milliGRAM(s) Oral daily    MEDICATIONS  (PRN):  acetaminophen     Tablet .. 650 milliGRAM(s) Oral every 6 hours PRN Temp greater or equal to 38C (100.4F), Mild Pain (1 - 3), Moderate Pain (4 - 6)  benzonatate 100 milliGRAM(s) Oral three times a day PRN Cough  guaifenesin/dextromethorphan Oral Liquid 10 milliLiter(s) Oral every 6 hours PRN cough  haloperidol     Tablet 5 milliGRAM(s) Oral every 6 hours PRN agiation  hydrOXYzine hydrochloride 50 milliGRAM(s) Oral every 6 hours PRN anxiety and insomnia  LORazepam     Tablet 2 milliGRAM(s) Oral every 6 hours PRN Agitation  methocarbamol 500 milliGRAM(s) Oral every 8 hours PRN Muscle Spasm  naproxen 500 milliGRAM(s) Oral two times a day PRN Moderate Pain (4 - 6)  ondansetron   Disintegrating Tablet 4 milliGRAM(s) Oral every 6 hours PRN Nausea  polyethylene glycol 3350 17 Gram(s) Oral every 24 hours PRN constipation  traZODone 50 milliGRAM(s) Oral at bedtime PRN insomnia   MEDICATIONS  (STANDING):  amLODIPine   Tablet 5 milliGRAM(s) Oral daily  dextrose 5%. 1000 milliLiter(s) (50 mL/Hr) IV Continuous <Continuous>  dextrose 5%. 1000 milliLiter(s) (100 mL/Hr) IV Continuous <Continuous>  dextrose 50% Injectable 25 Gram(s) IV Push once  dextrose 50% Injectable 12.5 Gram(s) IV Push once  dextrose 50% Injectable 25 Gram(s) IV Push once  famotidine    Tablet 40 milliGRAM(s) Oral at bedtime  glucagon  Injectable 1 milliGRAM(s) IntraMuscular once  insulin glargine Injectable (LANTUS) 54 Unit(s) SubCutaneous at bedtime  insulin glargine Injectable (LANTUS) 20 Unit(s) SubCutaneous every morning  insulin lispro (ADMELOG) corrective regimen sliding scale   SubCutaneous three times a day before meals  insulin lispro Injectable (ADMELOG) 18 Unit(s) SubCutaneous three times a day with meals  metoprolol tartrate 25 milliGRAM(s) Oral three times a day  pantoprazole    Tablet 40 milliGRAM(s) Oral before breakfast  sertraline 100 milliGRAM(s) Oral daily    MEDICATIONS  (PRN):  acetaminophen     Tablet .. 650 milliGRAM(s) Oral every 6 hours PRN Temp greater or equal to 38C (100.4F), Mild Pain (1 - 3), Moderate Pain (4 - 6)  benzonatate 100 milliGRAM(s) Oral three times a day PRN Cough  dextrose Oral Gel 15 Gram(s) Oral once PRN Blood Glucose LESS THAN 70 milliGRAM(s)/deciliter  guaifenesin/dextromethorphan Oral Liquid 10 milliLiter(s) Oral every 6 hours PRN cough  haloperidol     Tablet 5 milliGRAM(s) Oral every 6 hours PRN agiation  LORazepam     Tablet 2 milliGRAM(s) Oral every 6 hours PRN Agitation  methocarbamol 500 milliGRAM(s) Oral every 8 hours PRN Muscle Spasm  naproxen 500 milliGRAM(s) Oral two times a day PRN Moderate Pain (4 - 6)  ondansetron   Disintegrating Tablet 4 milliGRAM(s) Oral every 6 hours PRN Nausea  polyethylene glycol 3350 17 Gram(s) Oral every 24 hours PRN constipation  traZODone 50 milliGRAM(s) Oral at bedtime PRN insomnia

## 2024-12-25 LAB
GLUCOSE BLDC GLUCOMTR-MCNC: 119 MG/DL — HIGH (ref 70–99)
GLUCOSE BLDC GLUCOMTR-MCNC: 155 MG/DL — HIGH (ref 70–99)
GLUCOSE BLDC GLUCOMTR-MCNC: 212 MG/DL — HIGH (ref 70–99)
GLUCOSE BLDC GLUCOMTR-MCNC: 304 MG/DL — HIGH (ref 70–99)
GLUCOSE BLDC GLUCOMTR-MCNC: 43 MG/DL — CRITICAL LOW (ref 70–99)

## 2024-12-25 PROCEDURE — 99231 SBSQ HOSP IP/OBS SF/LOW 25: CPT

## 2024-12-25 RX ORDER — DEXTROSE MONOHYDRATE 25 G/50ML
15 INJECTION, SOLUTION INTRAVENOUS ONCE
Refills: 0 | Status: DISCONTINUED | OUTPATIENT
Start: 2024-12-25 | End: 2024-12-31

## 2024-12-25 RX ADMIN — Medication 18 UNIT(S): at 11:45

## 2024-12-25 RX ADMIN — PANTOPRAZOLE 40 MILLIGRAM(S): 40 TABLET, DELAYED RELEASE ORAL at 06:42

## 2024-12-25 RX ADMIN — INSULIN GLARGINE-YFGN 20 UNIT(S): 100 INJECTION, SOLUTION SUBCUTANEOUS at 07:48

## 2024-12-25 RX ADMIN — Medication 2: at 11:45

## 2024-12-25 RX ADMIN — INSULIN GLARGINE-YFGN 54 UNIT(S): 100 INJECTION, SOLUTION SUBCUTANEOUS at 20:14

## 2024-12-25 RX ADMIN — Medication 18 UNIT(S): at 07:17

## 2024-12-25 RX ADMIN — Medication 5 MILLIGRAM(S): at 08:34

## 2024-12-25 RX ADMIN — Medication 10 MILLILITER(S): at 08:33

## 2024-12-25 RX ADMIN — Medication 25 MILLIGRAM(S): at 13:07

## 2024-12-25 RX ADMIN — SERTRALINE HYDROCHLORIDE 100 MILLIGRAM(S): 25 TABLET ORAL at 08:33

## 2024-12-25 RX ADMIN — FAMOTIDINE 40 MILLIGRAM(S): 20 TABLET, FILM COATED ORAL at 20:14

## 2024-12-25 RX ADMIN — Medication 25 MILLIGRAM(S): at 20:14

## 2024-12-25 RX ADMIN — Medication 10 MILLILITER(S): at 17:27

## 2024-12-25 RX ADMIN — Medication 4: at 07:16

## 2024-12-25 RX ADMIN — Medication 25 MILLIGRAM(S): at 08:35

## 2024-12-25 NOTE — BH INPATIENT PSYCHIATRY PROGRESS NOTE - NSBHMETABOLIC_PSY_ALL_CORE_FT
BMI: BMI (kg/m2): 32.1 (12-22-24 @ 13:23)  HbA1c: A1C with Estimated Average Glucose Result: 9.5 % (12-23-24 @ 08:53)    Glucose: POCT Blood Glucose.: 212 mg/dL (12-25-24 @ 06:37)    BP: 134/80 (12-25-24 @ 08:27) (96/57 - 134/80)Vital Signs Last 24 Hrs  T(C): 36.2 (12-25-24 @ 08:27), Max: 36.3 (12-24-24 @ 17:08)  T(F): 97.1 (12-25-24 @ 08:27), Max: 97.4 (12-24-24 @ 17:08)  HR: 78 (12-25-24 @ 08:27) (78 - 87)  BP: 134/80 (12-25-24 @ 08:27) (120/71 - 134/80)  BP(mean): --  RR: --  SpO2: --      Lipid Panel: Date/Time: 12-23-24 @ 08:53  Cholesterol, Serum: 140  LDL Cholesterol Calculated: 79  HDL Cholesterol, Serum: 41  Total Cholesterol/HDL Ration Measurement: --  Triglycerides, Serum: 99

## 2024-12-25 NOTE — BH INPATIENT PSYCHIATRY PROGRESS NOTE - CURRENT MEDICATION
MEDICATIONS  (STANDING):  amLODIPine   Tablet 5 milliGRAM(s) Oral daily  dextrose 5%. 1000 milliLiter(s) (50 mL/Hr) IV Continuous <Continuous>  dextrose 5%. 1000 milliLiter(s) (100 mL/Hr) IV Continuous <Continuous>  dextrose 50% Injectable 25 Gram(s) IV Push once  dextrose 50% Injectable 12.5 Gram(s) IV Push once  dextrose 50% Injectable 25 Gram(s) IV Push once  famotidine    Tablet 40 milliGRAM(s) Oral at bedtime  glucagon  Injectable 1 milliGRAM(s) IntraMuscular once  insulin glargine Injectable (LANTUS) 54 Unit(s) SubCutaneous at bedtime  insulin glargine Injectable (LANTUS) 20 Unit(s) SubCutaneous every morning  insulin lispro (ADMELOG) corrective regimen sliding scale   SubCutaneous three times a day before meals  insulin lispro Injectable (ADMELOG) 18 Unit(s) SubCutaneous three times a day with meals  metoprolol tartrate 25 milliGRAM(s) Oral three times a day  pantoprazole    Tablet 40 milliGRAM(s) Oral before breakfast  sertraline 100 milliGRAM(s) Oral daily    MEDICATIONS  (PRN):  acetaminophen     Tablet .. 650 milliGRAM(s) Oral every 6 hours PRN Temp greater or equal to 38C (100.4F), Mild Pain (1 - 3), Moderate Pain (4 - 6)  benzonatate 100 milliGRAM(s) Oral three times a day PRN Cough  dextrose Oral Gel 15 Gram(s) Oral once PRN Blood Glucose LESS THAN 70 milliGRAM(s)/deciliter  guaifenesin/dextromethorphan Oral Liquid 10 milliLiter(s) Oral every 6 hours PRN cough  haloperidol     Tablet 5 milliGRAM(s) Oral every 6 hours PRN agiation  LORazepam     Tablet 2 milliGRAM(s) Oral every 6 hours PRN Agitation  methocarbamol 500 milliGRAM(s) Oral every 8 hours PRN Muscle Spasm  naproxen 500 milliGRAM(s) Oral two times a day PRN Moderate Pain (4 - 6)  ondansetron   Disintegrating Tablet 4 milliGRAM(s) Oral every 6 hours PRN Nausea  polyethylene glycol 3350 17 Gram(s) Oral every 24 hours PRN constipation  traZODone 50 milliGRAM(s) Oral at bedtime PRN insomnia

## 2024-12-25 NOTE — BH INPATIENT PSYCHIATRY PROGRESS NOTE - PRN MEDS
MEDICATIONS  (PRN):  acetaminophen     Tablet .. 650 milliGRAM(s) Oral every 6 hours PRN Temp greater or equal to 38C (100.4F), Mild Pain (1 - 3), Moderate Pain (4 - 6)  benzonatate 100 milliGRAM(s) Oral three times a day PRN Cough  dextrose Oral Gel 15 Gram(s) Oral once PRN Blood Glucose LESS THAN 70 milliGRAM(s)/deciliter  guaifenesin/dextromethorphan Oral Liquid 10 milliLiter(s) Oral every 6 hours PRN cough  haloperidol     Tablet 5 milliGRAM(s) Oral every 6 hours PRN agiation  LORazepam     Tablet 2 milliGRAM(s) Oral every 6 hours PRN Agitation  methocarbamol 500 milliGRAM(s) Oral every 8 hours PRN Muscle Spasm  naproxen 500 milliGRAM(s) Oral two times a day PRN Moderate Pain (4 - 6)  ondansetron   Disintegrating Tablet 4 milliGRAM(s) Oral every 6 hours PRN Nausea  polyethylene glycol 3350 17 Gram(s) Oral every 24 hours PRN constipation  traZODone 50 milliGRAM(s) Oral at bedtime PRN insomnia

## 2024-12-25 NOTE — BH INPATIENT PSYCHIATRY PROGRESS NOTE - NSBHCHARTREVIEWVS_PSY_A_CORE FT
Vital Signs Last 24 Hrs  T(C): 36.2 (12-25-24 @ 08:27), Max: 36.3 (12-24-24 @ 17:08)  T(F): 97.1 (12-25-24 @ 08:27), Max: 97.4 (12-24-24 @ 17:08)  HR: 78 (12-25-24 @ 08:27) (78 - 87)  BP: 134/80 (12-25-24 @ 08:27) (120/71 - 134/80)  BP(mean): --  RR: --  SpO2: --

## 2024-12-25 NOTE — BH INPATIENT PSYCHIATRY PROGRESS NOTE - NSBHFUPINTERVALHXFT_PSY_A_CORE
Patient seen and evaluated 12/25/24. pt is on isolation for flu. Upon approach patient calm and cooperative. No agitation or aggression noted. Patient expresses a better mood today. Only complaint is the cough. She is missing family on this vivien day. sleeps and eats well.  compliant with medication and tolerats increased dose. Denies any side effects/adverse reactions.  Will continue to monitor and titrate accordingly. Patient denies any suicidal/homicidal ideations. Patient denies any A/V hallucinations. No evidence of psychosis noted.     Per endocrine: obesity, type 1 diabetes, uses a medtronic insulin pump at home, but glucose control has always been very poor, very poor insight to her condition, and non compliance/lack of motivation, does not bolus with meals. weight gain and hunger due to multiple psych meds that promote weight gain, and insulin resistance, try and stop hydroxyzine if clinically feasible, as well as minimize psych meds that promote weight gain, and insulin resistance. in the meantime, stop all fruit juices, 1000 kcal diet, try and promote 0.5 kg. weight loss per week, add glargine 20 units in AM, continue  evening glargine, at discharge will follow up with dr mcbride.

## 2024-12-25 NOTE — BH INPATIENT PSYCHIATRY PROGRESS NOTE - NSBHASSESSSUMMFT_PSY_ALL_CORE
Patient is a 39 year old woman with a history of Major Depression, Anxiety and Borderline Personality Disorder who presented to the ED for the evaluation of worsening depression and suicidal ideations.   Patient continues to have significant depression, hopelessness, helplessness, anhedonia and continued suicidal ideations. It seems that her feelings of depression are also confounded with her feelings of dysphoria in the context of bereavement and lack of skills to cope with the loss of her father. Patient seems to have significant poor frustration tolerance and very poor impulse control.   At this time, patient is considered a danger to herself and needs inpatient psychiatric hospitalization for medication management and symptom stabilization.      Patient seen and evaluated 12/23/24. As per nursing report elevated blood sugar last night, SOB, cough. Seen by PA and hospitalist. Placed on isolation precautions. On approach patient calm and cooperative. No agitation or aggression noted. Patient expresses a better mood today. Only complaint is the cough.  Zoloft recently increased and Abilify recently added to augment Zoloft. Denies any side effects/adverse reactions. No side effects/adverse reactions noted. Will continue to monitor and titrate accordingly. Patient denies any suicidal/homicidal ideations. Patient denies any A/V hallucinations. No evidence of psychosis noted.     Per endocrine: obesity, type 1 diabetes, uses a medtronic insulin pump at home, but glucose control has always been very poor, very poor insight to her condition, and non compliance/lack of motivation, does not bolus with meals. weight gain and hunger due to multiple psych meds that promote weight gain, and insulin resistance, try and stop hydroxyzine if clinically feasible, as well as minimize psych meds that promote weight gain, and insulin resistance. in the meantime, stop all fruit juices, 1000 kcal diet, try and promote 0.5 kg. weight loss per week, add glargine 20 units in AM, continue  evening glargine, at discharge will follow up with dr mcbride.    #Major Depressive Disorder  -Increase Zoloft to 100mg po od  -Augment with Abilify 2mg po od d/c 12/24 per endocrine    #Sleep  -Trazodone 50mg po qhs PRN for insomnia    #HTN #GERD  #DM  amLODIPine   Tablet 5 milliGRAM(s) Oral daily  Matoprolol Tartrate 25mg TID  famotidine    Tablet 20 milliGRAM(s) Oral at bedtime  Pantoprazole 40mg daily  insulin glargine Injectable (LANTUS) 54 Unit(s) SubCutaneous at bedtime  Isulin lispro 18units TID with meals  -glargine 20 units in AM  - carb consistent diet   - hospitalist consult for glucose management     #PRNs  -Haldol 5mg po q6h PRN for agitation  -Ativan 2mg po q6h PRN for agitation, severe anxiety  -Benadryl 50mg po q6h PRN for agitation, insomnia, anxiety  -Robaxin PRN for pain  -Robitussin PRN and Tessalon Pearls PRN for cough

## 2024-12-26 DIAGNOSIS — F60.89 OTHER SPECIFIC PERSONALITY DISORDERS: ICD-10-CM

## 2024-12-26 LAB
GLUCOSE BLDC GLUCOMTR-MCNC: 148 MG/DL — HIGH (ref 70–99)
GLUCOSE BLDC GLUCOMTR-MCNC: 181 MG/DL — HIGH (ref 70–99)
GLUCOSE BLDC GLUCOMTR-MCNC: 231 MG/DL — HIGH (ref 70–99)
GLUCOSE BLDC GLUCOMTR-MCNC: 272 MG/DL — HIGH (ref 70–99)
GLUCOSE BLDC GLUCOMTR-MCNC: 80 MG/DL — SIGNIFICANT CHANGE UP (ref 70–99)

## 2024-12-26 PROCEDURE — 99232 SBSQ HOSP IP/OBS MODERATE 35: CPT

## 2024-12-26 RX ORDER — INSULIN LISPRO 100/ML
14 VIAL (ML) SUBCUTANEOUS
Refills: 0 | Status: DISCONTINUED | OUTPATIENT
Start: 2024-12-26 | End: 2024-12-31

## 2024-12-26 RX ORDER — BENZOCAINE/MENTH/CETYLPYRD CL 15 MG-4 MG
1 LOZENGE MUCOUS MEMBRANE ONCE
Refills: 0 | Status: COMPLETED | OUTPATIENT
Start: 2024-12-26 | End: 2024-12-27

## 2024-12-26 RX ADMIN — Medication 10 MILLILITER(S): at 06:50

## 2024-12-26 RX ADMIN — PANTOPRAZOLE 40 MILLIGRAM(S): 40 TABLET, DELAYED RELEASE ORAL at 06:36

## 2024-12-26 RX ADMIN — SERTRALINE HYDROCHLORIDE 100 MILLIGRAM(S): 25 TABLET ORAL at 08:31

## 2024-12-26 RX ADMIN — ACETAMINOPHEN 650 MILLIGRAM(S): 80 SOLUTION/ DROPS ORAL at 15:10

## 2024-12-26 RX ADMIN — FAMOTIDINE 40 MILLIGRAM(S): 20 TABLET, FILM COATED ORAL at 20:25

## 2024-12-26 RX ADMIN — Medication 18 UNIT(S): at 07:35

## 2024-12-26 RX ADMIN — Medication 25 MILLIGRAM(S): at 08:31

## 2024-12-26 RX ADMIN — TRAZODONE HYDROCHLORIDE 50 MILLIGRAM(S): 150 TABLET ORAL at 20:25

## 2024-12-26 RX ADMIN — Medication 10 MILLILITER(S): at 14:35

## 2024-12-26 RX ADMIN — ACETAMINOPHEN 650 MILLIGRAM(S): 80 SOLUTION/ DROPS ORAL at 14:35

## 2024-12-26 RX ADMIN — Medication 25 MILLIGRAM(S): at 20:25

## 2024-12-26 RX ADMIN — Medication 25 MILLIGRAM(S): at 14:04

## 2024-12-26 RX ADMIN — Medication 5 MILLIGRAM(S): at 08:31

## 2024-12-26 RX ADMIN — INSULIN GLARGINE-YFGN 20 UNIT(S): 100 INJECTION, SOLUTION SUBCUTANEOUS at 08:29

## 2024-12-26 RX ADMIN — ACETAMINOPHEN 650 MILLIGRAM(S): 80 SOLUTION/ DROPS ORAL at 01:20

## 2024-12-26 RX ADMIN — Medication 2: at 07:35

## 2024-12-26 RX ADMIN — INSULIN GLARGINE-YFGN 54 UNIT(S): 100 INJECTION, SOLUTION SUBCUTANEOUS at 20:26

## 2024-12-26 RX ADMIN — Medication 14 UNIT(S): at 17:26

## 2024-12-26 RX ADMIN — ACETAMINOPHEN 650 MILLIGRAM(S): 80 SOLUTION/ DROPS ORAL at 00:53

## 2024-12-26 NOTE — BH INPATIENT PSYCHIATRY PROGRESS NOTE - NSBHFUPINTERVALHXFT_PSY_A_CORE
Pt seen and evaluated, chart reviewed. As per nursing report, pt +flu and received PRNs, otherwise no other acute events. On evaluation, pt presents calm and cooperative, greets known writer with a smile. She reports she is physically unwell, otherwise mentally "ok". She states after last IPP discharge, she returned home and interpersonal conflicts resumed. She states she "needed to get away" and came to the hospital for respite. She reports she continues with low mood and anxiety d/t interpersonal conflicts, endorses motivation to strengthen coping skills and consider DBT/IOP on discharge. She denies AVH and paranoia. She denies current active SI/HI. She denies negative side effects of medications. Has not been a behavioral concern.  Spoke with pt's mother, Argelia ([phone]) - updated on POC, she reports pt was without SI/SIB prior to admission.

## 2024-12-26 NOTE — BH INPATIENT PSYCHIATRY PROGRESS NOTE - NSBHASSESSSUMMFT_PSY_ALL_CORE
Patient is a 39 year old woman with a history of Major Depression, Anxiety and Borderline Personality Disorder who presented to the ED for the evaluation of worsening depression and suicidal ideations. Patient continued to have significant depression, hopelessness, helplessness, anhedonia and continued suicidal ideations. It seems that her feelings of depression are also confounded with her feelings of dysphoria in the context of bereavement and lack of skills to cope with the loss of her father. Patient seems to have significant poor frustration tolerance and very poor impulse control. At this time, patient is considered a danger to herself and needs inpatient psychiatric hospitalization for medication management and symptom stabilization.    On evaluation, pt presents pleasant and cooperative, greets known writer with bright affect. She reports physically feeling unwell d/t flu, otherwise reports feeling mentally better since admission. She reports she continued with interpersonal conflicts when she returned home with limited coping skills, denied SI/SIB, self-presented for admission "to get away". She reports motivation to strengthen coping skills and consider DBT/IOP on discharge.    #Depression unspecified; r/o MDD, r/o adjustment d/o  #Personality d/o  -c/w zoloft to 100 mg daily  -encourage groups/DBT  -c/w trazodone 50 mg po qhs prn for insomnia    #Agitation  -for agitation not amenable to verbal redirection, may give haldol 5 mg q6h prn and ativan 2 mg 6h prn with escalation to IM if pt is refusing PO and is an acute danger to self or/and others with repeat EKG to ensure QTc <500 ms

## 2024-12-26 NOTE — BH INPATIENT PSYCHIATRY PROGRESS NOTE - NSBHMETABOLIC_PSY_ALL_CORE_FT
BMI: BMI (kg/m2): 32.1 (12-22-24 @ 13:23)  HbA1c: A1C with Estimated Average Glucose Result: 9.5 % (12-23-24 @ 08:53)    Glucose: POCT Blood Glucose.: 80 mg/dL (12-26-24 @ 11:30)    BP: 106/64 (12-26-24 @ 08:45) (96/57 - 134/80)Vital Signs Last 24 Hrs  T(C): 37.3 (12-26-24 @ 08:45), Max: 37.6 (12-25-24 @ 16:14)  T(F): 99.1 (12-26-24 @ 08:45), Max: 99.6 (12-25-24 @ 16:14)  HR: 85 (12-26-24 @ 08:45) (85 - 93)  BP: 106/64 (12-26-24 @ 08:45) (104/68 - 106/64)  BP(mean): --  RR: --  SpO2: --      Lipid Panel: Date/Time: 12-23-24 @ 08:53  Cholesterol, Serum: 140  LDL Cholesterol Calculated: 79  HDL Cholesterol, Serum: 41  Total Cholesterol/HDL Ration Measurement: --  Triglycerides, Serum: 99

## 2024-12-26 NOTE — BH INPATIENT PSYCHIATRY PROGRESS NOTE - PRN MEDS
MEDICATIONS  (PRN):  acetaminophen     Tablet .. 650 milliGRAM(s) Oral every 6 hours PRN Temp greater or equal to 38C (100.4F), Mild Pain (1 - 3), Moderate Pain (4 - 6)  benzonatate 100 milliGRAM(s) Oral three times a day PRN Cough  dextrose Oral Gel 15 Gram(s) Oral once PRN Blood Glucose LESS THAN 70 milliGRAM(s)/deciliter  dextrose Oral Gel 15 Gram(s) Oral once PRN Blood Glucose LESS THAN 70 milliGRAM(s)/deciliter  dextrose Oral Gel 15 Gram(s) Oral once PRN Blood Glucose LESS THAN 70 milliGRAM(s)/deciliter  guaifenesin/dextromethorphan Oral Liquid 10 milliLiter(s) Oral every 6 hours PRN cough  haloperidol     Tablet 5 milliGRAM(s) Oral every 6 hours PRN agiation  LORazepam     Tablet 2 milliGRAM(s) Oral every 6 hours PRN Agitation  methocarbamol 500 milliGRAM(s) Oral every 8 hours PRN Muscle Spasm  naproxen 500 milliGRAM(s) Oral two times a day PRN Moderate Pain (4 - 6)  ondansetron   Disintegrating Tablet 4 milliGRAM(s) Oral every 6 hours PRN Nausea  polyethylene glycol 3350 17 Gram(s) Oral every 24 hours PRN constipation  traZODone 50 milliGRAM(s) Oral at bedtime PRN insomnia

## 2024-12-26 NOTE — BH INPATIENT PSYCHIATRY PROGRESS NOTE - CURRENT MEDICATION
MEDICATIONS  (STANDING):  amLODIPine   Tablet 5 milliGRAM(s) Oral daily  benzocaine 20% Spray 1 Spray(s) Topical once  dextrose 5%. 1000 milliLiter(s) (50 mL/Hr) IV Continuous <Continuous>  dextrose 5%. 1000 milliLiter(s) (100 mL/Hr) IV Continuous <Continuous>  dextrose 50% Injectable 25 Gram(s) IV Push once  dextrose 50% Injectable 12.5 Gram(s) IV Push once  dextrose 50% Injectable 25 Gram(s) IV Push once  famotidine    Tablet 40 milliGRAM(s) Oral at bedtime  glucagon  Injectable 1 milliGRAM(s) IntraMuscular once  insulin glargine Injectable (LANTUS) 54 Unit(s) SubCutaneous at bedtime  insulin glargine Injectable (LANTUS) 20 Unit(s) SubCutaneous every morning  insulin lispro (ADMELOG) corrective regimen sliding scale   SubCutaneous three times a day before meals  insulin lispro Injectable (ADMELOG) 18 Unit(s) SubCutaneous three times a day with meals  metoprolol tartrate 25 milliGRAM(s) Oral three times a day  pantoprazole    Tablet 40 milliGRAM(s) Oral before breakfast  sertraline 100 milliGRAM(s) Oral daily    MEDICATIONS  (PRN):  acetaminophen     Tablet .. 650 milliGRAM(s) Oral every 6 hours PRN Temp greater or equal to 38C (100.4F), Mild Pain (1 - 3), Moderate Pain (4 - 6)  benzonatate 100 milliGRAM(s) Oral three times a day PRN Cough  dextrose Oral Gel 15 Gram(s) Oral once PRN Blood Glucose LESS THAN 70 milliGRAM(s)/deciliter  dextrose Oral Gel 15 Gram(s) Oral once PRN Blood Glucose LESS THAN 70 milliGRAM(s)/deciliter  dextrose Oral Gel 15 Gram(s) Oral once PRN Blood Glucose LESS THAN 70 milliGRAM(s)/deciliter  guaifenesin/dextromethorphan Oral Liquid 10 milliLiter(s) Oral every 6 hours PRN cough  haloperidol     Tablet 5 milliGRAM(s) Oral every 6 hours PRN agiation  LORazepam     Tablet 2 milliGRAM(s) Oral every 6 hours PRN Agitation  methocarbamol 500 milliGRAM(s) Oral every 8 hours PRN Muscle Spasm  naproxen 500 milliGRAM(s) Oral two times a day PRN Moderate Pain (4 - 6)  ondansetron   Disintegrating Tablet 4 milliGRAM(s) Oral every 6 hours PRN Nausea  polyethylene glycol 3350 17 Gram(s) Oral every 24 hours PRN constipation  traZODone 50 milliGRAM(s) Oral at bedtime PRN insomnia

## 2024-12-26 NOTE — BH INPATIENT PSYCHIATRY PROGRESS NOTE - NSBHCHARTREVIEWVS_PSY_A_CORE FT
Vital Signs Last 24 Hrs  T(C): 37.3 (12-26-24 @ 08:45), Max: 37.6 (12-25-24 @ 16:14)  T(F): 99.1 (12-26-24 @ 08:45), Max: 99.6 (12-25-24 @ 16:14)  HR: 85 (12-26-24 @ 08:45) (85 - 93)  BP: 106/64 (12-26-24 @ 08:45) (104/68 - 106/64)  BP(mean): --  RR: --  SpO2: --

## 2024-12-27 LAB
GLUCOSE BLDC GLUCOMTR-MCNC: 103 MG/DL — HIGH (ref 70–99)
GLUCOSE BLDC GLUCOMTR-MCNC: 110 MG/DL — HIGH (ref 70–99)
GLUCOSE BLDC GLUCOMTR-MCNC: 116 MG/DL — HIGH (ref 70–99)
GLUCOSE BLDC GLUCOMTR-MCNC: 204 MG/DL — HIGH (ref 70–99)

## 2024-12-27 PROCEDURE — 99232 SBSQ HOSP IP/OBS MODERATE 35: CPT

## 2024-12-27 RX ADMIN — Medication 25 MILLIGRAM(S): at 01:34

## 2024-12-27 RX ADMIN — Medication 10 MILLILITER(S): at 22:09

## 2024-12-27 RX ADMIN — Medication 14 UNIT(S): at 07:33

## 2024-12-27 RX ADMIN — SERTRALINE HYDROCHLORIDE 100 MILLIGRAM(S): 25 TABLET ORAL at 08:51

## 2024-12-27 RX ADMIN — Medication 25 MILLIGRAM(S): at 21:25

## 2024-12-27 RX ADMIN — FAMOTIDINE 40 MILLIGRAM(S): 20 TABLET, FILM COATED ORAL at 21:25

## 2024-12-27 RX ADMIN — PANTOPRAZOLE 40 MILLIGRAM(S): 40 TABLET, DELAYED RELEASE ORAL at 08:51

## 2024-12-27 RX ADMIN — Medication 25 MILLIGRAM(S): at 08:51

## 2024-12-27 RX ADMIN — Medication 5 MILLIGRAM(S): at 08:51

## 2024-12-27 RX ADMIN — Medication 4: at 07:33

## 2024-12-27 RX ADMIN — INSULIN GLARGINE-YFGN 54 UNIT(S): 100 INJECTION, SOLUTION SUBCUTANEOUS at 20:50

## 2024-12-27 RX ADMIN — Medication 1 SPRAY(S): at 09:09

## 2024-12-27 RX ADMIN — INSULIN GLARGINE-YFGN 20 UNIT(S): 100 INJECTION, SOLUTION SUBCUTANEOUS at 10:09

## 2024-12-27 RX ADMIN — Medication 14 UNIT(S): at 11:42

## 2024-12-27 RX ADMIN — Medication 14 UNIT(S): at 16:34

## 2024-12-27 NOTE — BH DISCHARGE NOTE NURSING/SOCIAL WORK/PSYCH REHAB - PATIENT PORTAL LINK FT
You can access the FollowMyHealth Patient Portal offered by [facility] by registering at the following website: [email or web address]. By joining [facility]’s FollowMyHealth portal, you will also be able to view your health information using other applications (apps) compatible with our system.

## 2024-12-27 NOTE — BH INPATIENT PSYCHIATRY PROGRESS NOTE - NSBHFUPINTERVALHXFT_PSY_A_CORE
Pt seen and evaluated, chart reviewed. As per nursing report, pt +flu and received PRNs, otherwise no other acute events. On evaluation, pt reports somatic complaints 2/2 flu. She reports difficulty sleeping last night d/t coughing, reports feeling tired this morning. She reports low mood and anxiety d/t interpersonal conflicts and ruminations of her losses, endorses motivation to strengthen coping skills and consider DBT/IOP on discharge. She denies AVH and paranoia. She denies current active SI/HI. She denies negative side effects of medications. Has not been a behavioral concern.

## 2024-12-27 NOTE — BH DISCHARGE NOTE NURSING/SOCIAL WORK/PSYCH REHAB - NSCDUDCCRISIS_PSY_A_CORE
[facility] Well  [phone] [facility] ([phone])  Text "WELL" to [phone]  Website: [email or web address] Suicide Prevention Lifeline 5 [phone]) 758-0175/.  [employer]  1 (271) LIFENET ([phone])/988 Suicide and Crisis Lifeline

## 2024-12-27 NOTE — BH INPATIENT PSYCHIATRY PROGRESS NOTE - NSBHMETABOLIC_PSY_ALL_CORE_FT
BMI: BMI (kg/m2): 32.1 (12-22-24 @ 13:23)  HbA1c: A1C with Estimated Average Glucose Result: 9.5 % (12-23-24 @ 08:53)    Glucose: POCT Blood Glucose.: 204 mg/dL (12-27-24 @ 06:58)    BP: 141/76 (12-27-24 @ 08:42) (104/68 - 141/76)Vital Signs Last 24 Hrs  T(C): 37.1 (12-27-24 @ 08:42), Max: 37.9 (12-26-24 @ 16:07)  T(F): 98.8 (12-27-24 @ 08:42), Max: 100.3 (12-26-24 @ 16:07)  HR: 85 (12-27-24 @ 08:42) (84 - 90)  BP: 141/76 (12-27-24 @ 08:42) (115/62 - 141/76)  BP(mean): --  RR: 18 (12-26-24 @ 14:03) (18 - 18)  SpO2: 97% (12-26-24 @ 19:51) (96% - 97%)      Lipid Panel: Date/Time: 12-23-24 @ 08:53  Cholesterol, Serum: 140  LDL Cholesterol Calculated: 79  HDL Cholesterol, Serum: 41  Total Cholesterol/HDL Ration Measurement: --  Triglycerides, Serum: 99

## 2024-12-27 NOTE — BH INPATIENT PSYCHIATRY PROGRESS NOTE - NSBHASSESSSUMMFT_PSY_ALL_CORE
Patient is a 39 year old woman with a history of Major Depression, Anxiety and Borderline Personality Disorder who presented to the ED for the evaluation of worsening depression and suicidal ideations. Patient continued to have significant depression, hopelessness, helplessness, anhedonia and continued suicidal ideations. It seems that her feelings of depression are also confounded with her feelings of dysphoria in the context of bereavement and lack of skills to cope with the loss of her father. Patient seems to have significant poor frustration tolerance and very poor impulse control. At this time, patient is considered a danger to herself and needs inpatient psychiatric hospitalization for medication management and symptom stabilization.    On evaluation, pt reports somatic complaints from +flu with disrupted sleep and anergia. She reports feeling low with ruminations on current circumstances with limited coping skills. She reports motivation to strengthen coping skills and consider DBT/IOP on discharge. She denies SI, is help-seeking. Has been with no self injurious or hostile behaviors.    #Depression unspecified; r/o MDD, r/o adjustment d/o  #Personality d/o  -c/w zoloft to 100 mg daily  -encourage groups/DBT  -c/w trazodone 50 mg po qhs prn for insomnia    #Agitation  -for agitation not amenable to verbal redirection, may give haldol 5 mg q6h prn and ativan 2 mg 6h prn with escalation to IM if pt is refusing PO and is an acute danger to self or/and others with repeat EKG to ensure QTc <500 ms

## 2024-12-27 NOTE — BH INPATIENT PSYCHIATRY PROGRESS NOTE - CURRENT MEDICATION
MEDICATIONS  (STANDING):  amLODIPine   Tablet 5 milliGRAM(s) Oral daily  dextrose 5%. 1000 milliLiter(s) (50 mL/Hr) IV Continuous <Continuous>  dextrose 5%. 1000 milliLiter(s) (100 mL/Hr) IV Continuous <Continuous>  dextrose 50% Injectable 25 Gram(s) IV Push once  dextrose 50% Injectable 12.5 Gram(s) IV Push once  dextrose 50% Injectable 25 Gram(s) IV Push once  famotidine    Tablet 40 milliGRAM(s) Oral at bedtime  glucagon  Injectable 1 milliGRAM(s) IntraMuscular once  insulin glargine Injectable (LANTUS) 54 Unit(s) SubCutaneous at bedtime  insulin glargine Injectable (LANTUS) 20 Unit(s) SubCutaneous every morning  insulin lispro (ADMELOG) corrective regimen sliding scale   SubCutaneous three times a day before meals  insulin lispro Injectable (ADMELOG) 14 Unit(s) SubCutaneous three times a day with meals  metoprolol tartrate 25 milliGRAM(s) Oral three times a day  pantoprazole    Tablet 40 milliGRAM(s) Oral before breakfast  sertraline 100 milliGRAM(s) Oral daily    MEDICATIONS  (PRN):  acetaminophen     Tablet .. 650 milliGRAM(s) Oral every 6 hours PRN Temp greater or equal to 38C (100.4F), Mild Pain (1 - 3), Moderate Pain (4 - 6)  benzonatate 100 milliGRAM(s) Oral three times a day PRN Cough  dextrose Oral Gel 15 Gram(s) Oral once PRN Blood Glucose LESS THAN 70 milliGRAM(s)/deciliter  dextrose Oral Gel 15 Gram(s) Oral once PRN Blood Glucose LESS THAN 70 milliGRAM(s)/deciliter  dextrose Oral Gel 15 Gram(s) Oral once PRN Blood Glucose LESS THAN 70 milliGRAM(s)/deciliter  guaifenesin/dextromethorphan Oral Liquid 10 milliLiter(s) Oral every 6 hours PRN cough  haloperidol     Tablet 5 milliGRAM(s) Oral every 6 hours PRN agiation  LORazepam     Tablet 2 milliGRAM(s) Oral every 6 hours PRN Agitation  methocarbamol 500 milliGRAM(s) Oral every 8 hours PRN Muscle Spasm  naproxen 500 milliGRAM(s) Oral two times a day PRN Moderate Pain (4 - 6)  ondansetron   Disintegrating Tablet 4 milliGRAM(s) Oral every 6 hours PRN Nausea  polyethylene glycol 3350 17 Gram(s) Oral every 24 hours PRN constipation  traZODone 50 milliGRAM(s) Oral at bedtime PRN insomnia

## 2024-12-27 NOTE — BH INPATIENT PSYCHIATRY PROGRESS NOTE - NSBHCHARTREVIEWVS_PSY_A_CORE FT
Vital Signs Last 24 Hrs  T(C): 37.1 (12-27-24 @ 08:42), Max: 37.9 (12-26-24 @ 16:07)  T(F): 98.8 (12-27-24 @ 08:42), Max: 100.3 (12-26-24 @ 16:07)  HR: 85 (12-27-24 @ 08:42) (84 - 90)  BP: 141/76 (12-27-24 @ 08:42) (115/62 - 141/76)  BP(mean): --  RR: 18 (12-26-24 @ 14:03) (18 - 18)  SpO2: 97% (12-26-24 @ 19:51) (96% - 97%)

## 2024-12-28 LAB
GLUCOSE BLDC GLUCOMTR-MCNC: 214 MG/DL — HIGH (ref 70–99)
GLUCOSE BLDC GLUCOMTR-MCNC: 271 MG/DL — HIGH (ref 70–99)
GLUCOSE BLDC GLUCOMTR-MCNC: 282 MG/DL — HIGH (ref 70–99)
GLUCOSE BLDC GLUCOMTR-MCNC: 90 MG/DL — SIGNIFICANT CHANGE UP (ref 70–99)

## 2024-12-28 RX ADMIN — Medication 100 MILLIGRAM(S): at 20:57

## 2024-12-28 RX ADMIN — Medication 10 MILLILITER(S): at 13:50

## 2024-12-28 RX ADMIN — Medication 10 MILLILITER(S): at 07:03

## 2024-12-28 RX ADMIN — Medication 4: at 11:38

## 2024-12-28 RX ADMIN — Medication 14 UNIT(S): at 07:27

## 2024-12-28 RX ADMIN — Medication 5 MILLIGRAM(S): at 08:37

## 2024-12-28 RX ADMIN — Medication 10 MILLILITER(S): at 20:57

## 2024-12-28 RX ADMIN — Medication 25 MILLIGRAM(S): at 21:03

## 2024-12-28 RX ADMIN — Medication 25 MILLIGRAM(S): at 13:50

## 2024-12-28 RX ADMIN — Medication 14 UNIT(S): at 11:38

## 2024-12-28 RX ADMIN — Medication 6: at 07:28

## 2024-12-28 RX ADMIN — ACETAMINOPHEN 650 MILLIGRAM(S): 80 SOLUTION/ DROPS ORAL at 11:53

## 2024-12-28 RX ADMIN — PANTOPRAZOLE 40 MILLIGRAM(S): 40 TABLET, DELAYED RELEASE ORAL at 06:46

## 2024-12-28 RX ADMIN — Medication 25 MILLIGRAM(S): at 08:37

## 2024-12-28 RX ADMIN — FAMOTIDINE 40 MILLIGRAM(S): 20 TABLET, FILM COATED ORAL at 20:57

## 2024-12-28 RX ADMIN — SERTRALINE HYDROCHLORIDE 100 MILLIGRAM(S): 25 TABLET ORAL at 08:37

## 2024-12-28 RX ADMIN — INSULIN GLARGINE-YFGN 54 UNIT(S): 100 INJECTION, SOLUTION SUBCUTANEOUS at 20:57

## 2024-12-28 RX ADMIN — TRAZODONE HYDROCHLORIDE 50 MILLIGRAM(S): 150 TABLET ORAL at 20:57

## 2024-12-28 RX ADMIN — ACETAMINOPHEN 650 MILLIGRAM(S): 80 SOLUTION/ DROPS ORAL at 12:30

## 2024-12-28 RX ADMIN — INSULIN GLARGINE-YFGN 20 UNIT(S): 100 INJECTION, SOLUTION SUBCUTANEOUS at 08:37

## 2024-12-28 NOTE — PROGRESS NOTE ADULT - SUBJECTIVE AND OBJECTIVE BOX
Reason for Endocrinology Consult: Diabetes    HPI: 39y Female    D  PAST MEDICAL & SURGICAL HISTORY:  Neuropathy  right lower extremity, vaginal      Type 1 diabetes      Degenerative disc disease, thoracic      Urinary tract infection, recurrent      Gastroesophageal reflux disease, esophagitis presence not specified      Intractable headache, unspecified chronicity pattern, unspecified headache type      Tremor of right hand      Tachycardia      Spinal stenosis      No significant past surgical history        FAMILY HISTORY:      SH:  Smoking  Etoh:  Recreational Drugs:    Home Medications:  famotidine 40 mg oral tablet: 1 tab(s) orally once a day (at bedtime) (10 Dec 2024 09:54)  insulin glargine 100 units/mL subcutaneous solution: 50 unit(s) subcutaneous once a day (at bedtime) (10 Dec 2024 09:54)  insulin lispro 100 units/mL injectable solution: 15 unit(s) subcutaneous 3 times a day (with meals) (10 Dec 2024 09:54)  methocarbamol 500 mg oral tablet: 2 tab(s) orally every 8 hours As needed back pain (10 Dec 2024 09:54)  metoprolol tartrate 25 mg oral tablet: 1 tab(s) orally 3 times a day (10 Dec 2024 09:54)  naproxen 500 mg oral tablet: 1 tab(s) orally every 12 hours As needed pain (10 Dec 2024 09:54)  Norvasc 5 mg oral tablet: 1 tab(s) orally once a day (07 Nov 2024 22:03)  omeprazole 40 mg oral delayed release capsule: 1 cap(s) orally once a day (04 Dec 2024 09:08)      Current (Non-Endocrine) Meds:  acetaminophen     Tablet .. 650 milliGRAM(s) Oral every 6 hours PRN  amLODIPine   Tablet 5 milliGRAM(s) Oral daily  benzonatate 100 milliGRAM(s) Oral three times a day PRN  dextrose 5%. 1000 milliLiter(s) IV Continuous <Continuous>  dextrose 5%. 1000 milliLiter(s) IV Continuous <Continuous>  famotidine    Tablet 40 milliGRAM(s) Oral at bedtime  guaifenesin/dextromethorphan Oral Liquid 10 milliLiter(s) Oral every 6 hours PRN  haloperidol     Tablet 5 milliGRAM(s) Oral every 6 hours PRN  LORazepam     Tablet 2 milliGRAM(s) Oral every 6 hours PRN  methocarbamol 500 milliGRAM(s) Oral every 8 hours PRN  metoprolol tartrate 25 milliGRAM(s) Oral three times a day  naproxen 500 milliGRAM(s) Oral two times a day PRN  ondansetron   Disintegrating Tablet 4 milliGRAM(s) Oral every 6 hours PRN  pantoprazole    Tablet 40 milliGRAM(s) Oral before breakfast  polyethylene glycol 3350 17 Gram(s) Oral every 24 hours PRN  sertraline 100 milliGRAM(s) Oral daily  traZODone 50 milliGRAM(s) Oral at bedtime PRN      Current Endocrine Meds:   dextrose 50% Injectable 25 Gram(s) IV Push once  dextrose 50% Injectable 12.5 Gram(s) IV Push once  dextrose 50% Injectable 25 Gram(s) IV Push once  dextrose Oral Gel 15 Gram(s) Oral once PRN  dextrose Oral Gel 15 Gram(s) Oral once PRN  dextrose Oral Gel 15 Gram(s) Oral once PRN  glucagon  Injectable 1 milliGRAM(s) IntraMuscular once  insulin glargine Injectable (LANTUS) 54 Unit(s) SubCutaneous at bedtime  insulin glargine Injectable (LANTUS) 20 Unit(s) SubCutaneous every morning  insulin lispro (ADMELOG) corrective regimen sliding scale   SubCutaneous three times a day before meals  insulin lispro Injectable (ADMELOG) 14 Unit(s) SubCutaneous three times a day with meals      Allergies:  Fluad 0.5 ML ODETTE (Unknown)  Influenza Virus Vaccine, H5N1, Inactivated (Other)  penicillins (Hives)  Ceclor (Rash)  Cipro (Other)  amoxicillin (Hives)  clindamycin (Hives; Nephrotoxicity)  Clindamycin Phosphate (Unknown)  gabapentin (Other)      ROS:  Denies the following except as indicated.    General: weight loss/weight gain, decreased appetite, fatigue, fever  Eyes: blurry vision, double vision  ENT: neck swelling, dysphagia, voice changes   CV: palpitations, SOB, chest pain, cough  GI: nausea, vomiting, diarrhea, constipation, abdominal pain  : nocturia,  polyuria, dysuria  Endo: decreased libido, heat/cold intolerance, jitteriness  MSK: arthralgias, myalgias  Skin: rash, dryness, diaphoresis  Neuro: pedal numbness,pedal paresthesias, pedal pain    Height (cm): 162.6 (12-22 @ 13:23)  Weight (kg): 85 (12-22 @ 13:23)  BMI (kg/m2): 32.1 (12-22 @ 13:23)    Vital Signs Last 24 Hrs  T(C): 36.2 (28 Dec 2024 10:19), Max: 37.2 (27 Dec 2024 15:57)  T(F): 97.1 (28 Dec 2024 10:19), Max: 99 (27 Dec 2024 15:57)  HR: 83 (28 Dec 2024 10:19) (83 - 105)  BP: 107/70 (28 Dec 2024 10:19) (107/70 - 109/66)  BP(mean): --  RR: 20 (28 Dec 2024 10:19) (20 - 20)  SpO2: --      Constitutional: WN/WD in NAD.   Neck: no thyromegaly or palpable thyroid nodules   Respiratory: lungs CTAB.  Cardiovascular: regular rate and rhythm, normal S1 and S2, no audible murmurs  GI: soft, NT/ND, no masses/HSM appreciated.  Ext: no edema, no ulcers, pedal pulses palpable bilaterally  Neurology: no tremor, monofilament sensation intact in feet  Psychiatric: A&O x 3, normal affect/mood.        LABS:                                     Thyroid Stimulating Hormone, Serum: 0.66 (12-23 @ 08:53)          RADIOLOGY & ADDITIONAL STUDIES:    A/P:39yFemale    1.  DM  For now:  Glargine-    units at bedtime  Lispro-    units three times a day before meals   Will continue to monitor     At discharge, recommend:      Pt can follow up at discharge with:    Above discussed with resident.
Reason for Endocrinology Consult: Diabetes    HPI: 39y Female    PAST MEDICAL & SURGICAL HISTORY:  Neuropathy  right lower extremity, vaginal      Type 1 diabetes      Degenerative disc disease, thoracic      Urinary tract infection, recurrent      Gastroesophageal reflux disease, esophagitis presence not specified      Intractable headache, unspecified chronicity pattern, unspecified headache type      Tremor of right hand      Tachycardia      Spinal stenosis      No significant past surgical history        FAMILY HISTORY:      SH:  Smoking  Etoh:  Recreational Drugs:    Home Medications:  famotidine 40 mg oral tablet: 1 tab(s) orally once a day (at bedtime) (10 Dec 2024 09:54)  insulin glargine 100 units/mL subcutaneous solution: 50 unit(s) subcutaneous once a day (at bedtime) (10 Dec 2024 09:54)  insulin lispro 100 units/mL injectable solution: 15 unit(s) subcutaneous 3 times a day (with meals) (10 Dec 2024 09:54)  methocarbamol 500 mg oral tablet: 2 tab(s) orally every 8 hours As needed back pain (10 Dec 2024 09:54)  metoprolol tartrate 25 mg oral tablet: 1 tab(s) orally 3 times a day (10 Dec 2024 09:54)  naproxen 500 mg oral tablet: 1 tab(s) orally every 12 hours As needed pain (10 Dec 2024 09:54)  Norvasc 5 mg oral tablet: 1 tab(s) orally once a day (07 Nov 2024 22:03)  omeprazole 40 mg oral delayed release capsule: 1 cap(s) orally once a day (04 Dec 2024 09:08)      Current (Non-Endocrine) Meds:  acetaminophen     Tablet .. 650 milliGRAM(s) Oral every 6 hours PRN  amLODIPine   Tablet 5 milliGRAM(s) Oral daily  benzocaine 20% Spray 1 Spray(s) Topical once  benzonatate 100 milliGRAM(s) Oral three times a day PRN  dextrose 5%. 1000 milliLiter(s) IV Continuous <Continuous>  dextrose 5%. 1000 milliLiter(s) IV Continuous <Continuous>  famotidine    Tablet 40 milliGRAM(s) Oral at bedtime  guaifenesin/dextromethorphan Oral Liquid 10 milliLiter(s) Oral every 6 hours PRN  haloperidol     Tablet 5 milliGRAM(s) Oral every 6 hours PRN  LORazepam     Tablet 2 milliGRAM(s) Oral every 6 hours PRN  methocarbamol 500 milliGRAM(s) Oral every 8 hours PRN  metoprolol tartrate 25 milliGRAM(s) Oral three times a day  naproxen 500 milliGRAM(s) Oral two times a day PRN  ondansetron   Disintegrating Tablet 4 milliGRAM(s) Oral every 6 hours PRN  pantoprazole    Tablet 40 milliGRAM(s) Oral before breakfast  polyethylene glycol 3350 17 Gram(s) Oral every 24 hours PRN  sertraline 100 milliGRAM(s) Oral daily  traZODone 50 milliGRAM(s) Oral at bedtime PRN      Current Endocrine Meds:   dextrose 50% Injectable 25 Gram(s) IV Push once  dextrose 50% Injectable 12.5 Gram(s) IV Push once  dextrose 50% Injectable 25 Gram(s) IV Push once  dextrose Oral Gel 15 Gram(s) Oral once PRN  dextrose Oral Gel 15 Gram(s) Oral once PRN  dextrose Oral Gel 15 Gram(s) Oral once PRN  glucagon  Injectable 1 milliGRAM(s) IntraMuscular once  insulin glargine Injectable (LANTUS) 54 Unit(s) SubCutaneous at bedtime  insulin glargine Injectable (LANTUS) 20 Unit(s) SubCutaneous every morning  insulin lispro (ADMELOG) corrective regimen sliding scale   SubCutaneous three times a day before meals  insulin lispro Injectable (ADMELOG) 18 Unit(s) SubCutaneous three times a day with meals      Allergies:  Fluad 0.5 ML ODETTE (Unknown)  Influenza Virus Vaccine, H5N1, Inactivated (Other)  penicillins (Hives)  Ceclor (Rash)  Cipro (Other)  amoxicillin (Hives)  clindamycin (Hives; Nephrotoxicity)  Clindamycin Phosphate (Unknown)  gabapentin (Other)      ROS:  Denies the following except as indicated.    General: weight loss/weight gain, decreased appetite, fatigue, fever  Eyes: blurry vision, double vision  ENT: neck swelling, dysphagia, voice changes   CV: palpitations, SOB, chest pain, cough  GI: nausea, vomiting, diarrhea, constipation, abdominal pain  : nocturia,  polyuria, dysuria  Endo: decreased libido, heat/cold intolerance, jitteriness  MSK: arthralgias, myalgias  Skin: rash, dryness, diaphoresis  Neuro: pedal numbness,pedal paresthesias, pedal pain    Height (cm): 162.6 (12-22 @ 13:23)  Weight (kg): 85 (12-22 @ 13:23)  BMI (kg/m2): 32.1 (12-22 @ 13:23)    Vital Signs Last 24 Hrs  T(C): 37.3 (26 Dec 2024 08:45), Max: 37.6 (25 Dec 2024 16:14)  T(F): 99.1 (26 Dec 2024 08:45), Max: 99.6 (25 Dec 2024 16:14)  HR: 85 (26 Dec 2024 08:45) (85 - 93)  BP: 106/64 (26 Dec 2024 08:45) (104/68 - 106/64)  BP(mean): --  RR: --  SpO2: --      Constitutional: WN/WD in NAD.   Neck: no thyromegaly or palpable thyroid nodules   Respiratory: lungs CTAB.  Cardiovascular: regular rate and rhythm, normal S1 and S2, no audible murmurs  GI: soft, NT/ND, no masses/HSM appreciated.  Ext: no edema, no ulcers, pedal pulses palpable bilaterally  Neurology: no tremor, monofilament sensation intact in feet  Psychiatric: A&O x 3, normal affect/mood.        LABS:                                     Thyroid Stimulating Hormone, Serum: 0.66 (12-23 @ 08:53)          RADIOLOGY & ADDITIONAL STUDIES:    A/P:39yFemale    1.  DM  For now:  Glargine-    units at bedtime  Lispro-    units three times a day before meals   Will continue to monitor     At discharge, recommend:      Pt can follow up at discharge with:    Above discussed with resident.

## 2024-12-28 NOTE — PROGRESS NOTE ADULT - ASSESSMENT
reasonable dm control, except high fasting sugars, try changing dinnertime lispro to 16 units, no other changes
please change lispro before meals to 14 units

## 2024-12-29 LAB
APPEARANCE UR: CLEAR — SIGNIFICANT CHANGE UP
BACTERIA # UR AUTO: ABNORMAL /HPF
BILIRUB UR-MCNC: NEGATIVE — SIGNIFICANT CHANGE UP
COLOR SPEC: YELLOW — SIGNIFICANT CHANGE UP
DIFF PNL FLD: NEGATIVE — SIGNIFICANT CHANGE UP
EPI CELLS # UR: PRESENT
GLUCOSE BLDC GLUCOMTR-MCNC: 130 MG/DL — HIGH (ref 70–99)
GLUCOSE BLDC GLUCOMTR-MCNC: 158 MG/DL — HIGH (ref 70–99)
GLUCOSE BLDC GLUCOMTR-MCNC: 191 MG/DL — HIGH (ref 70–99)
GLUCOSE BLDC GLUCOMTR-MCNC: 322 MG/DL — HIGH (ref 70–99)
GLUCOSE UR QL: NEGATIVE MG/DL — SIGNIFICANT CHANGE UP
KETONES UR-MCNC: NEGATIVE MG/DL — SIGNIFICANT CHANGE UP
LEUKOCYTE ESTERASE UR-ACNC: ABNORMAL
NITRITE UR-MCNC: NEGATIVE — SIGNIFICANT CHANGE UP
PH UR: 7 — SIGNIFICANT CHANGE UP (ref 5–8)
PROT UR-MCNC: NEGATIVE MG/DL — SIGNIFICANT CHANGE UP
RBC CASTS # UR COMP ASSIST: 0 /HPF — SIGNIFICANT CHANGE UP (ref 0–4)
SP GR SPEC: 1 — SIGNIFICANT CHANGE UP (ref 1–1.03)
SQUAMOUS # UR AUTO: 4 /HPF — SIGNIFICANT CHANGE UP (ref 0–5)
UROBILINOGEN FLD QL: 0.2 MG/DL — SIGNIFICANT CHANGE UP (ref 0.2–1)
WBC UR QL: 6 /HPF — HIGH (ref 0–5)

## 2024-12-29 RX ORDER — FLUCONAZOLE 200 MG/1
150 TABLET ORAL ONCE
Refills: 0 | Status: COMPLETED | OUTPATIENT
Start: 2024-12-29 | End: 2024-12-29

## 2024-12-29 RX ORDER — INSULIN LISPRO 100/ML
7 VIAL (ML) SUBCUTANEOUS ONCE
Refills: 0 | Status: COMPLETED | OUTPATIENT
Start: 2024-12-29 | End: 2024-12-29

## 2024-12-29 RX ADMIN — FLUCONAZOLE 150 MILLIGRAM(S): 200 TABLET ORAL at 14:11

## 2024-12-29 RX ADMIN — Medication 14 UNIT(S): at 16:21

## 2024-12-29 RX ADMIN — PANTOPRAZOLE 40 MILLIGRAM(S): 40 TABLET, DELAYED RELEASE ORAL at 08:32

## 2024-12-29 RX ADMIN — Medication 7 UNIT(S): at 12:28

## 2024-12-29 RX ADMIN — Medication 2: at 16:21

## 2024-12-29 RX ADMIN — Medication 14 UNIT(S): at 07:11

## 2024-12-29 RX ADMIN — Medication 25 MILLIGRAM(S): at 20:54

## 2024-12-29 RX ADMIN — ACETAMINOPHEN 650 MILLIGRAM(S): 80 SOLUTION/ DROPS ORAL at 12:14

## 2024-12-29 RX ADMIN — ACETAMINOPHEN 650 MILLIGRAM(S): 80 SOLUTION/ DROPS ORAL at 21:54

## 2024-12-29 RX ADMIN — FAMOTIDINE 40 MILLIGRAM(S): 20 TABLET, FILM COATED ORAL at 20:55

## 2024-12-29 RX ADMIN — ACETAMINOPHEN 650 MILLIGRAM(S): 80 SOLUTION/ DROPS ORAL at 20:54

## 2024-12-29 RX ADMIN — Medication 25 MILLIGRAM(S): at 08:32

## 2024-12-29 RX ADMIN — Medication 5 MILLIGRAM(S): at 08:32

## 2024-12-29 RX ADMIN — INSULIN GLARGINE-YFGN 20 UNIT(S): 100 INJECTION, SOLUTION SUBCUTANEOUS at 08:32

## 2024-12-29 RX ADMIN — ACETAMINOPHEN 650 MILLIGRAM(S): 80 SOLUTION/ DROPS ORAL at 13:02

## 2024-12-29 RX ADMIN — Medication 10 MILLILITER(S): at 20:54

## 2024-12-29 RX ADMIN — Medication 8: at 07:11

## 2024-12-29 RX ADMIN — Medication 10 MILLILITER(S): at 12:14

## 2024-12-29 RX ADMIN — Medication 25 MILLIGRAM(S): at 14:11

## 2024-12-29 RX ADMIN — SERTRALINE HYDROCHLORIDE 100 MILLIGRAM(S): 25 TABLET ORAL at 08:32

## 2024-12-29 RX ADMIN — INSULIN GLARGINE-YFGN 54 UNIT(S): 100 INJECTION, SOLUTION SUBCUTANEOUS at 20:54

## 2024-12-29 NOTE — CHART NOTE - NSCHARTNOTEFT_GEN_A_CORE
Was notified by RN for evaluation of possible yeast infection. Patient was seen and examined at bedside. States her labia is red and inflamed with mild dysuria and itching. Denies yellow discharge, frequency, urgency, hematuria. Reports she's had a UTI and yeast infection in the past and it feels similar to both. Will order a UA and start Diflucan.
Called by RN about elevated blood glucose level of 300.  Patient was given 8 units of Lispro earlier prior to dinner and FS still elevated.   Will give one dose of Lispro 6 units now for FS of 300.   Patient has an evening dose of Lantus prior to bedtime ordered as well.  RN to check FS prior to bedtime dose of Lantus.

## 2024-12-30 LAB
APPEARANCE UR: ABNORMAL
BACTERIA # UR AUTO: ABNORMAL /HPF
BILIRUB UR-MCNC: NEGATIVE — SIGNIFICANT CHANGE UP
COLOR SPEC: YELLOW — SIGNIFICANT CHANGE UP
DIFF PNL FLD: NEGATIVE — SIGNIFICANT CHANGE UP
EPI CELLS # UR: PRESENT
GLUCOSE BLDC GLUCOMTR-MCNC: 112 MG/DL — HIGH (ref 70–99)
GLUCOSE BLDC GLUCOMTR-MCNC: 134 MG/DL — HIGH (ref 70–99)
GLUCOSE BLDC GLUCOMTR-MCNC: 277 MG/DL — HIGH (ref 70–99)
GLUCOSE BLDC GLUCOMTR-MCNC: 309 MG/DL — HIGH (ref 70–99)
GLUCOSE BLDC GLUCOMTR-MCNC: 53 MG/DL — CRITICAL LOW (ref 70–99)
GLUCOSE BLDC GLUCOMTR-MCNC: 65 MG/DL — LOW (ref 70–99)
GLUCOSE UR QL: NEGATIVE MG/DL — SIGNIFICANT CHANGE UP
KETONES UR-MCNC: NEGATIVE MG/DL — SIGNIFICANT CHANGE UP
LEUKOCYTE ESTERASE UR-ACNC: ABNORMAL
NITRITE UR-MCNC: NEGATIVE — SIGNIFICANT CHANGE UP
PH UR: 7 — SIGNIFICANT CHANGE UP (ref 5–8)
PROT UR-MCNC: NEGATIVE MG/DL — SIGNIFICANT CHANGE UP
RBC CASTS # UR COMP ASSIST: 2 /HPF — SIGNIFICANT CHANGE UP (ref 0–4)
SP GR SPEC: 1.01 — SIGNIFICANT CHANGE UP (ref 1–1.03)
SQUAMOUS # UR AUTO: >36 /HPF — HIGH (ref 0–5)
UROBILINOGEN FLD QL: 0.2 MG/DL — SIGNIFICANT CHANGE UP (ref 0.2–1)
WBC UR QL: 7 /HPF — HIGH (ref 0–5)

## 2024-12-30 PROCEDURE — 99232 SBSQ HOSP IP/OBS MODERATE 35: CPT

## 2024-12-30 RX ORDER — INSULIN GLARGINE-YFGN 100 [IU]/ML
54 INJECTION, SOLUTION SUBCUTANEOUS
Qty: 0 | Refills: 0 | DISCHARGE
Start: 2024-12-30

## 2024-12-30 RX ORDER — INSULIN GLARGINE-YFGN 100 [IU]/ML
20 INJECTION, SOLUTION SUBCUTANEOUS
Qty: 0 | Refills: 0 | DISCHARGE
Start: 2024-12-30

## 2024-12-30 RX ORDER — METHOCARBAMOL 500 MG
1 TABLET ORAL
Qty: 0 | Refills: 0 | DISCHARGE
Start: 2024-12-30

## 2024-12-30 RX ORDER — INSULIN LISPRO 100/ML
14 VIAL (ML) SUBCUTANEOUS
Qty: 0 | Refills: 0 | DISCHARGE
Start: 2024-12-30

## 2024-12-30 RX ADMIN — Medication 5 MILLIGRAM(S): at 08:53

## 2024-12-30 RX ADMIN — Medication 14 UNIT(S): at 11:49

## 2024-12-30 RX ADMIN — Medication 8: at 07:28

## 2024-12-30 RX ADMIN — FAMOTIDINE 40 MILLIGRAM(S): 20 TABLET, FILM COATED ORAL at 20:13

## 2024-12-30 RX ADMIN — Medication 25 MILLIGRAM(S): at 08:50

## 2024-12-30 RX ADMIN — Medication 14 UNIT(S): at 07:27

## 2024-12-30 RX ADMIN — ACETAMINOPHEN 650 MILLIGRAM(S): 80 SOLUTION/ DROPS ORAL at 09:56

## 2024-12-30 RX ADMIN — Medication 10 MILLILITER(S): at 13:23

## 2024-12-30 RX ADMIN — PANTOPRAZOLE 40 MILLIGRAM(S): 40 TABLET, DELAYED RELEASE ORAL at 08:51

## 2024-12-30 RX ADMIN — INSULIN GLARGINE-YFGN 20 UNIT(S): 100 INJECTION, SOLUTION SUBCUTANEOUS at 08:48

## 2024-12-30 RX ADMIN — Medication 6: at 11:50

## 2024-12-30 RX ADMIN — Medication 25 MILLIGRAM(S): at 20:13

## 2024-12-30 RX ADMIN — ACETAMINOPHEN 650 MILLIGRAM(S): 80 SOLUTION/ DROPS ORAL at 08:50

## 2024-12-30 RX ADMIN — Medication 25 MILLIGRAM(S): at 13:22

## 2024-12-30 RX ADMIN — Medication 100 MILLIGRAM(S): at 20:13

## 2024-12-30 RX ADMIN — SERTRALINE HYDROCHLORIDE 100 MILLIGRAM(S): 25 TABLET ORAL at 08:53

## 2024-12-30 RX ADMIN — Medication 14 UNIT(S): at 17:10

## 2024-12-30 NOTE — BH INPATIENT PSYCHIATRY DISCHARGE NOTE - DESCRIPTION
Patient reports that she is adopted, and grew up on [place], completed high school but was in special education.

## 2024-12-30 NOTE — DIETITIAN INITIAL EVALUATION ADULT - ORAL INTAKE PTA/DIET HISTORY
as per pt consumes a diabetic diet with good po intake. NKFA or intolerances. pt denies any recent weight changes.         presently on a CHO consistent diet with glucerna shakes 240 ml q 12 hrs tolerating well pt consumed 50-75% of meals today and >75% of glucerna shakes. pt states appetite has begun to improve

## 2024-12-30 NOTE — BH INPATIENT PSYCHIATRY PROGRESS NOTE - NSBHFUPINTERVALHXFT_PSY_A_CORE
Pt seen and evaluated, chart reviewed. As per nursing report, pt +flu and received PRNs, otherwise no other acute events. On evaluation, pt presents with bright affect, greets treatment team with a smile, well-groomed. She reports she is with improved mood and anxiety since admission, states she feels ready to go home and requesting for discharge before the new year. She reports fair appetite and sleep. She denies AVH, paranoia. She denies passive and active SI/HI. She presents future-oriented, reports motivation to attend IOP and strengthen coping skills. She reports loose stools which may be 2/2 flu sx, denies distress, denies other possible negative side effects of medications. Has not been a behavioral concern. Anticipated discharge for tomorrow.  Spoke with pt's mother, Argelia- updated on POC and denies safety concerns on discharge.

## 2024-12-30 NOTE — BH INPATIENT PSYCHIATRY PROGRESS NOTE - NSBHCONSBHPROVCNTCTNOFT_PSY_A_CORE
Please contact during day tomorrow

## 2024-12-30 NOTE — BH INPATIENT PSYCHIATRY DISCHARGE NOTE - HPI (INCLUDE ILLNESS QUALITY, SEVERITY, DURATION, TIMING, CONTEXT, MODIFYING FACTORS, ASSOCIATED SIGNS AND SYMPTOMS)
Patient is a 39 year old  woman, single, has no children, resides with her mother at [facility] and her boyfriend lives at same residence, disabled, on SSI, PMHx of thoracic degenerative disc disease, GERD, headaches, spinal stenosis, tachycardia, right hand tremor, Type 1 diabetes, recurrent UTI, PPHx of Major Depression, Anxiety and Borderline personality Disorder who presented to the ED for the evaluation of worsening depression and suicidal ideations.    In the ED, patient is observed to be sitting in the examination chair, appears anxious, tearful at times, and well oriented to time, place and person. Patient denies  acute symptoms of psychosis or alvina. She denies current use of illicit drugs or alcohol. Patient reports that she was discharged from the psychiatry floor on 12/10/2024 after being admitted there for about a week for depression and suicidal thoughts. Patient reports that she was still having these symptoms on the day she was discharged but that the staff there informed her that she needed to go home. She states "they said I was saying this because I did not want to go home." Patient reports that she continues to have depressed mood, sleeps all the time, has difficulty getting out of bed, has no energy or desire to do anything, and has no pleasure. She reports that she feels hopeless and helpless and is having suicidal thoughts and intent but has no plan at this time. Patient reports that she has been feelings depressed for a while now but the 2 year anniversary of her father's death was on Thanksgiving day and  was her grandfathers birthday and he  3 years ago. She reports that the holidays are very difficult for her and she has not been able to cope with the death of both her father and grandfather.     Patient reports that her psychiatrist is Dr. Curiel at [facility] and he treats her depression with Zoloft 75mg daily. Patient reports that she saw him yesterday but he was reluctant to increase the Zoloft to 100mg because she had complained of having diarrhea. She however reports that she no longer has diarrhea and has no other side effects of Zoloft. Patient reports that she also receives psychotherapy from [facility].    Collateral information was obtained from patient's mother Argelia Mei ([phone]). She reports that patient informed her that she was still depressed and was having suicidal thoughts even on the day that she was discharged from the psychiatry floor. She reports that patient saw both her therapist and psychiatrist yesterday and doesn't see them until  and  respectively. She reports that she is very concerned because it appears that patient's medication is not addressing her depression and she is afraid that patient will try to kill herself if she takes her home again. Patient's mother reports that patient's therapist and psychiatrist are going back and forth as to whether or not she has borderline personality disorder and whether or not she needs DBT or IOP. She reports that patient has multiple medical problems that she is having difficulty coping with in addition to the fact that her father passed away. She states " I'm afraid to take her home because she said that she can't take it anymore and wants to join her father and she may go home and drink bleach or detergent ".    According to the ED team, patient complained of abdominal pain, a CT Scan was ordered . No abnormality was observed.    During today's psychiatry interview on [facility] 2-S, patient reiterates much of the above same history. She reiterates that she does not feel that she was ready for discharge when she was last sent home from [facility]. She states she has been feeling depressed and suicidal ever since her discharge and this was exacerbated yesterday when her mom and her boyfriend got into a heated argument. Patient states she is feeling sad and depressed, has lost interest in normal activities, has decreased energy/concentration, appetite is good, but states she is sleeping "too much." She "constantly feels like crying." Patient states overall she is "not feeling good" for all of the above mentioned reasons particularly difficultly coping with her father and grandfather's deaths and due to the holiday season. Patient states that she is currently experiencing suicidal ideation without plan. Patient denies AVH. No symptoms of alvina.

## 2024-12-30 NOTE — DIETITIAN INITIAL EVALUATION ADULT - PERTINENT MEDS FT
MEDICATIONS  (STANDING):  amLODIPine   Tablet 5 milliGRAM(s) Oral daily  famotidine    Tablet 40 milliGRAM(s) Oral at bedtime  insulin glargine Injectable (LANTUS) 54 Unit(s) SubCutaneous at bedtime  insulin glargine Injectable (LANTUS) 20 Unit(s) SubCutaneous every morning  insulin lispro (ADMELOG) corrective regimen sliding scale   SubCutaneous three times a day before meals  insulin lispro Injectable (ADMELOG) 14 Unit(s) SubCutaneous three times a day with meals  metoprolol tartrate 25 milliGRAM(s) Oral three times a day  pantoprazole    Tablet 40 milliGRAM(s) Oral before breakfast  sertraline 100 milliGRAM(s) Oral daily    MEDICATIONS  (PRN):  acetaminophen     Tablet .. 650 milliGRAM(s) Oral every 6 hours PRN Temp greater or equal to 38C (100.4F), Mild Pain (1 - 3), Moderate Pain (4 - 6)  benzonatate 100 milliGRAM(s) Oral three times a day PRN Cough  guaifenesin/dextromethorphan Oral Liquid 10 milliLiter(s) Oral every 6 hours PRN cough  haloperidol     Tablet 5 milliGRAM(s) Oral every 6 hours PRN agiation  methocarbamol 500 milliGRAM(s) Oral every 8 hours PRN Muscle Spasm  naproxen 500 milliGRAM(s) Oral two times a day PRN Moderate Pain (4 - 6)  ondansetron   Disintegrating Tablet 4 milliGRAM(s) Oral every 6 hours PRN Nausea  polyethylene glycol 3350 17 Gram(s) Oral every 24 hours PRN constipation  traZODone 50 milliGRAM(s) Oral at bedtime PRN insomnia

## 2024-12-30 NOTE — BH INPATIENT PSYCHIATRY DISCHARGE NOTE - ATTENDING DISCHARGE PHYSICAL EXAMINATION:
Interviewed the patient with the NP Feli. Patient reports good euthymic mood, presents with bright affect, denies SI, makes appropriate future plans - to celebrate [place] with her BF. Agree with the assessment and plan, patient is stable for discharge.

## 2024-12-30 NOTE — BH INPATIENT PSYCHIATRY DISCHARGE NOTE - NSBHDCMEDICALFT_PSY_A_CORE
#DM, uncontrolled  -A1C 9.5  -endocrinology consult appreciated    #Leukocytosis  #cough  -RVP +    #2.4cm hepatic lobe hypodensity incidental on CT abdomen  -outpatient MRI abdomen  Pt c/o possible yeast infection ("labia is red and inflamed with mild dysuria and itching. Denies yellow discharge, frequency, urgency, hematuria"), given diflucan. Pt reported continued dysuria, UA noted and pending urine culture, bactrim x5 days started.    #DM, uncontrolled  -A1C 9.5  -endocrinology consult appreciated    #Leukocytosis  #cough  -RVP +    #2.4cm hepatic lobe hypodensity incidental on CT abdomen  -outpatient MRI abdomen

## 2024-12-30 NOTE — BH INPATIENT PSYCHIATRY PROGRESS NOTE - NSBHMETABOLIC_PSY_ALL_CORE_FT
BMI: BMI (kg/m2): 32.1 (12-22-24 @ 13:23)  HbA1c: A1C with Estimated Average Glucose Result: 9.5 % (12-23-24 @ 08:53)    Glucose: POCT Blood Glucose.: 277 mg/dL (12-30-24 @ 11:39)    BP: 100/65 (12-29-24 @ 16:02) (85/52 - 114/73)Vital Signs Last 24 Hrs  T(C): 36.7 (12-29-24 @ 16:02), Max: 36.7 (12-29-24 @ 16:02)  T(F): 98.1 (12-29-24 @ 16:02), Max: 98.1 (12-29-24 @ 16:02)  HR: 79 (12-29-24 @ 16:02) (79 - 79)  BP: 100/65 (12-29-24 @ 16:02) (100/65 - 100/65)  BP(mean): --  RR: 18 (12-29-24 @ 16:02) (18 - 18)  SpO2: --      Lipid Panel: Date/Time: 12-23-24 @ 08:53  Cholesterol, Serum: 140  LDL Cholesterol Calculated: 79  HDL Cholesterol, Serum: 41  Total Cholesterol/HDL Ration Measurement: --  Triglycerides, Serum: 99

## 2024-12-30 NOTE — DIETITIAN INITIAL EVALUATION ADULT - NSPROEDALEARNPREF_GEN_A_NUR
pt refuses diet education at this time, pt states she has been following a diabetic diet for her whole life and does not have any nutritional concerns at this time

## 2024-12-30 NOTE — BH INPATIENT PSYCHIATRY DISCHARGE NOTE - HOSPITAL COURSE
Pt self-presented requesting for admission reporting worsening depression and SI in setting of upcoming death anniversaries and interpersonal conflicts, later also reported dissatisfaction with OP providers and being upset that she was not given requested medication change (increase zoloft), collateral and pt denied SI, as well as respite from residence. Pt was resumed on zoloft and zoloft titrated to requested 100 mg daily; trialed on abilify augmentation, however discontinued after endocrinology recs to avoid medications that may affect sugars. Pt tolerated medications well, reported loose stools otherwise denied other negative side effects. Pt has been future-oriented and goal-directed since admission, endorsing need to f/u medical appointments and 3x/week PT, accepted IOP referral to strengthen coping skills. Pt endorsed overall improved mood and anxiety. Denied suicidal or homicidal ideations. Pt able to safety plan. Denied any auditory or visual hallucinations. Pt with good ADLs. Pt able to vocalize and utilize prosocial behaviors to address needs, and engage appropriately with staff and group milieu. Abnormalities in mental status improved sufficiently to permit outgoing care in the outpatient setting. Pt requested to be discharge prior to the new year. Pt was evaluated by treatment team, pt is stable for discharge and shows no imminent danger to self, others or property at this time. She understands and agrees with treatment plan and following up with outpatient. Psychoeducation provided regarding diagnosis, medications, treatment and follow up. Risks, benefits, alternatives discussed, all questions and concerns addressed and answered. Reviewed crisis intervention with verbalized understanding. Pt self-presented requesting for admission reporting worsening depression and SI in setting of upcoming death anniversaries and interpersonal conflicts, later also reported dissatisfaction with OP providers and being upset that she was not given requested medication change (increase zoloft), collateral and pt denied SI, as well as respite from residence. Pt was resumed on zoloft and zoloft titrated to requested 100 mg daily; trialed on abilify augmentation, however discontinued after endocrinology recs to avoid medications that may affect sugars. Pt tolerated medications well, reported loose stools prior to discharge otherwise denied other negative side effects. Pt has been future-oriented and goal-directed since admission, endorsing need to f/u medical appointments and 3x/week PT, accepted IOP referral to strengthen coping skills. Pt endorsed overall improved mood and anxiety. Denied suicidal or homicidal ideations. Pt able to safety plan. Denied any auditory or visual hallucinations. Pt with good ADLs. Pt able to vocalize and utilize prosocial behaviors to address needs, and engage appropriately with staff and group milieu. Abnormalities in mental status improved sufficiently to permit outgoing care in the outpatient setting. Pt requested to be discharge prior to the new year. Pt was evaluated by treatment team, pt is stable for discharge and shows no imminent danger to self, others or property at this time. She understands and agrees with treatment plan and following up with outpatient. Psychoeducation provided regarding diagnosis, medications, treatment and follow up. Risks, benefits, alternatives discussed, all questions and concerns addressed and answered. Reviewed crisis intervention with verbalized understanding.

## 2024-12-30 NOTE — BH INPATIENT PSYCHIATRY DISCHARGE NOTE - NSDCMRMEDTOKEN_GEN_ALL_CORE_FT
famotidine 40 mg oral tablet: 1 tab(s) orally once a day (at bedtime)  insulin glargine 100 units/mL subcutaneous solution: 50 unit(s) subcutaneous once a day (at bedtime)  insulin lispro 100 units/mL injectable solution: 15 unit(s) subcutaneous 3 times a day (with meals)  methocarbamol 500 mg oral tablet: 2 tab(s) orally every 8 hours As needed back pain  metoprolol tartrate 25 mg oral tablet: 1 tab(s) orally 3 times a day  naproxen 500 mg oral tablet: 1 tab(s) orally every 12 hours As needed pain  Norvasc 5 mg oral tablet: 1 tab(s) orally once a day  omeprazole 40 mg oral delayed release capsule: 1 cap(s) orally once a day  sertraline 25 mg oral tablet: 3 tab(s) orally once a day (at bedtime) x 14 days Continue to take as prescribed until told otherwise by your provider   benzonatate 100 mg oral capsule: 1 cap(s) orally 3 times a day x 5 days as needed for Cough  famotidine 40 mg oral tablet: 1 tab(s) orally once a day (at bedtime)  insulin glargine 100 units/mL subcutaneous solution: 54 unit(s) subcutaneous once a day (at bedtime)  insulin glargine 100 units/mL subcutaneous solution: 20 unit(s) subcutaneous once a day  insulin lispro 100 units/mL injectable solution: 14 unit(s) subcutaneous 3 times a day (with meals)  methocarbamol 500 mg oral tablet: 1 tab(s) orally every 8 hours As needed Muscle Spasm  metoprolol tartrate 25 mg oral tablet: 1 tab(s) orally 3 times a day  naproxen 500 mg oral tablet: 1 tab(s) orally every 12 hours As needed pain  Norvasc 5 mg oral tablet: 1 tab(s) orally once a day  omeprazole 40 mg oral delayed release capsule: 1 cap(s) orally once a day  sertraline 100 mg oral tablet: 1 tab(s) orally once a day x 14 days Continue to take as prescribed until told otherwise by your provider  sulfamethoxazole-trimethoprim 800 mg-160 mg oral tablet: 1 tab(s) orally 2 times a day x 5 days Continue to take as prescribed until told otherwise by your provider

## 2024-12-30 NOTE — BH INPATIENT PSYCHIATRY PROGRESS NOTE - NSBHCHARTREVIEWVS_PSY_A_CORE FT
Vital Signs Last 24 Hrs  T(C): 36.7 (12-29-24 @ 16:02), Max: 36.7 (12-29-24 @ 16:02)  T(F): 98.1 (12-29-24 @ 16:02), Max: 98.1 (12-29-24 @ 16:02)  HR: 79 (12-29-24 @ 16:02) (79 - 79)  BP: 100/65 (12-29-24 @ 16:02) (100/65 - 100/65)  BP(mean): --  RR: 18 (12-29-24 @ 16:02) (18 - 18)  SpO2: --

## 2024-12-30 NOTE — BH INPATIENT PSYCHIATRY DISCHARGE NOTE - NSDCCPCAREPLAN_GEN_ALL_CORE_FT
PRINCIPAL DISCHARGE DIAGNOSIS  Diagnosis: Depression, unspecified depression type  Assessment and Plan of Treatment: Continue current medication regimen and follow up with aftercare appointments      SECONDARY DISCHARGE DIAGNOSES  Diagnosis: Cluster B personality disorder  Assessment and Plan of Treatment: Continue current medication regimen and follow up with aftercare appointments

## 2024-12-30 NOTE — BH INPATIENT PSYCHIATRY PROGRESS NOTE - CURRENT MEDICATION
MEDICATIONS  (STANDING):  amLODIPine   Tablet 5 milliGRAM(s) Oral daily  dextrose 5%. 1000 milliLiter(s) (50 mL/Hr) IV Continuous <Continuous>  dextrose 5%. 1000 milliLiter(s) (100 mL/Hr) IV Continuous <Continuous>  dextrose 50% Injectable 25 Gram(s) IV Push once  dextrose 50% Injectable 12.5 Gram(s) IV Push once  dextrose 50% Injectable 25 Gram(s) IV Push once  famotidine    Tablet 40 milliGRAM(s) Oral at bedtime  glucagon  Injectable 1 milliGRAM(s) IntraMuscular once  insulin glargine Injectable (LANTUS) 54 Unit(s) SubCutaneous at bedtime  insulin glargine Injectable (LANTUS) 20 Unit(s) SubCutaneous every morning  insulin lispro (ADMELOG) corrective regimen sliding scale   SubCutaneous three times a day before meals  insulin lispro Injectable (ADMELOG) 14 Unit(s) SubCutaneous three times a day with meals  metoprolol tartrate 25 milliGRAM(s) Oral three times a day  pantoprazole    Tablet 40 milliGRAM(s) Oral before breakfast  sertraline 100 milliGRAM(s) Oral daily    MEDICATIONS  (PRN):  acetaminophen     Tablet .. 650 milliGRAM(s) Oral every 6 hours PRN Temp greater or equal to 38C (100.4F), Mild Pain (1 - 3), Moderate Pain (4 - 6)  benzonatate 100 milliGRAM(s) Oral three times a day PRN Cough  dextrose Oral Gel 15 Gram(s) Oral once PRN Blood Glucose LESS THAN 70 milliGRAM(s)/deciliter  dextrose Oral Gel 15 Gram(s) Oral once PRN Blood Glucose LESS THAN 70 milliGRAM(s)/deciliter  dextrose Oral Gel 15 Gram(s) Oral once PRN Blood Glucose LESS THAN 70 milliGRAM(s)/deciliter  guaifenesin/dextromethorphan Oral Liquid 10 milliLiter(s) Oral every 6 hours PRN cough  haloperidol     Tablet 5 milliGRAM(s) Oral every 6 hours PRN agiation  methocarbamol 500 milliGRAM(s) Oral every 8 hours PRN Muscle Spasm  naproxen 500 milliGRAM(s) Oral two times a day PRN Moderate Pain (4 - 6)  ondansetron   Disintegrating Tablet 4 milliGRAM(s) Oral every 6 hours PRN Nausea  polyethylene glycol 3350 17 Gram(s) Oral every 24 hours PRN constipation  traZODone 50 milliGRAM(s) Oral at bedtime PRN insomnia

## 2024-12-30 NOTE — BH INPATIENT PSYCHIATRY PROGRESS NOTE - PRN MEDS
MEDICATIONS  (PRN):  acetaminophen     Tablet .. 650 milliGRAM(s) Oral every 6 hours PRN Temp greater or equal to 38C (100.4F), Mild Pain (1 - 3), Moderate Pain (4 - 6)  benzonatate 100 milliGRAM(s) Oral three times a day PRN Cough  dextrose Oral Gel 15 Gram(s) Oral once PRN Blood Glucose LESS THAN 70 milliGRAM(s)/deciliter  dextrose Oral Gel 15 Gram(s) Oral once PRN Blood Glucose LESS THAN 70 milliGRAM(s)/deciliter  dextrose Oral Gel 15 Gram(s) Oral once PRN Blood Glucose LESS THAN 70 milliGRAM(s)/deciliter  guaifenesin/dextromethorphan Oral Liquid 10 milliLiter(s) Oral every 6 hours PRN cough  haloperidol     Tablet 5 milliGRAM(s) Oral every 6 hours PRN agiation  methocarbamol 500 milliGRAM(s) Oral every 8 hours PRN Muscle Spasm  naproxen 500 milliGRAM(s) Oral two times a day PRN Moderate Pain (4 - 6)  ondansetron   Disintegrating Tablet 4 milliGRAM(s) Oral every 6 hours PRN Nausea  polyethylene glycol 3350 17 Gram(s) Oral every 24 hours PRN constipation  traZODone 50 milliGRAM(s) Oral at bedtime PRN insomnia

## 2024-12-30 NOTE — DIETITIAN INITIAL EVALUATION ADULT - OTHER INFO
pt is 39 year old female with hx of major depression, anxiety, borderline personality disorder, type 1 DM since 2 years of age p/w worsening depression and SI. pt found to be flu+ on 12/23.

## 2024-12-30 NOTE — BH INPATIENT PSYCHIATRY PROGRESS NOTE - NSBHASSESSSUMMFT_PSY_ALL_CORE
Patient is a 39 year old woman with a history of Major Depression, Anxiety and Borderline Personality Disorder who presented to the ED for the evaluation of worsening depression and suicidal ideations. Patient continued to have significant depression, hopelessness, helplessness, anhedonia and continued suicidal ideations. It seems that her feelings of depression are also confounded with her feelings of dysphoria in the context of bereavement and lack of skills to cope with the loss of her father. Patient seems to have significant poor frustration tolerance and very poor impulse control. At this time, patient is considered a danger to herself and needs inpatient psychiatric hospitalization for medication management and symptom stabilization.    On evaluation, pt presents with bright affect and well-groomed, reports she is feeling better and is requesting for discharge before the new year. She reports overall improved mood and anxiety, reports sleeping and eating well, denies SI/HI. She presents future-oriented, reports motivation to strengthen coping skills and f/u IOP. Has been with no self injurious or hostile behaviors. Anticipated discharge for tomorrow.    #Depression unspecified; r/o MDD, r/o adjustment d/o  #Personality d/o  -c/w zoloft to 100 mg daily  -encourage groups/DBT  -c/w trazodone 50 mg po qhs prn for insomnia    #Agitation  -for agitation not amenable to verbal redirection, may give haldol 5 mg q6h prn and ativan 2 mg 6h prn with escalation to IM if pt is refusing PO and is an acute danger to self or/and others with repeat EKG to ensure QTc <500 ms

## 2024-12-30 NOTE — DIETITIAN INITIAL EVALUATION ADULT - PERTINENT LABORATORY DATA
POCT Blood Glucose.: 134 mg/dL (12-30-24 @ 16:20)  A1C with Estimated Average Glucose Result: 9.5 % (12-23-24 @ 08:53)  A1C with Estimated Average Glucose Result: 10.0 % (11-08-24 @ 06:35)  A1C with Estimated Average Glucose Result: 12.1 % (04-01-24 @ 07:30)

## 2024-12-30 NOTE — DIETITIAN INITIAL EVALUATION ADULT - NS FNS DIET ORDER
Diet, Consistent Carbohydrate w/Evening Snack:   Supplement Feeding Modality:  Oral  Glucerna Shake Cans or Servings Per Day:  1       Frequency:  Two Times a day (12-26-24 @ 17:08) [Active]

## 2024-12-31 ENCOUNTER — APPOINTMENT (OUTPATIENT)
Dept: PSYCHIATRY | Facility: CLINIC | Age: 39
End: 2024-12-31

## 2024-12-31 ENCOUNTER — OUTPATIENT (OUTPATIENT)
Dept: OUTPATIENT SERVICES | Facility: HOSPITAL | Age: 39
LOS: 1 days | End: 2024-12-31
Payer: MEDICAID

## 2024-12-31 VITALS — TEMPERATURE: 98 F | HEART RATE: 99 BPM | DIASTOLIC BLOOD PRESSURE: 68 MMHG | SYSTOLIC BLOOD PRESSURE: 107 MMHG

## 2024-12-31 DIAGNOSIS — F60.3 BORDERLINE PERSONALITY DISORDER: ICD-10-CM

## 2024-12-31 LAB — GLUCOSE BLDC GLUCOMTR-MCNC: 280 MG/DL — HIGH (ref 70–99)

## 2024-12-31 PROCEDURE — 99238 HOSP IP/OBS DSCHRG MGMT 30/<: CPT

## 2024-12-31 PROCEDURE — 90791 PSYCH DIAGNOSTIC EVALUATION: CPT

## 2024-12-31 RX ORDER — BENZONATATE 100 MG
1 CAPSULE ORAL
Qty: 15 | Refills: 0
Start: 2024-12-31 | End: 2025-01-04

## 2024-12-31 RX ORDER — SULFAMETHOXAZOLE/TRIMETHOPRIM 800-160 MG
1 TABLET ORAL
Qty: 10 | Refills: 0
Start: 2024-12-31 | End: 2025-01-04

## 2024-12-31 RX ORDER — SERTRALINE HYDROCHLORIDE 25 MG/1
1 TABLET ORAL
Qty: 14 | Refills: 0
Start: 2024-12-31 | End: 2025-01-13

## 2024-12-31 RX ORDER — SULFAMETHOXAZOLE/TRIMETHOPRIM 800-160 MG
1 TABLET ORAL
Refills: 0 | Status: DISCONTINUED | OUTPATIENT
Start: 2024-12-31 | End: 2024-12-31

## 2024-12-31 RX ADMIN — PANTOPRAZOLE 40 MILLIGRAM(S): 40 TABLET, DELAYED RELEASE ORAL at 06:32

## 2024-12-31 RX ADMIN — INSULIN GLARGINE-YFGN 20 UNIT(S): 100 INJECTION, SOLUTION SUBCUTANEOUS at 08:08

## 2024-12-31 RX ADMIN — Medication 25 MILLIGRAM(S): at 08:09

## 2024-12-31 RX ADMIN — SERTRALINE HYDROCHLORIDE 100 MILLIGRAM(S): 25 TABLET ORAL at 08:09

## 2024-12-31 RX ADMIN — Medication 5 MILLIGRAM(S): at 08:09

## 2024-12-31 NOTE — BH INPATIENT PSYCHIATRY PROGRESS NOTE - NSICDXBHTERTIARYDX_PSY_ALL_CORE
R/O Adjustment disorder   F43.20  R/O MDD (major depressive disorder)   F32.9  

## 2024-12-31 NOTE — BH INPATIENT PSYCHIATRY PROGRESS NOTE - NSBHMSESPEECH_PSY_A_CORE
Abnormal as indicated, otherwise normal...
Normal volume, rate, productivity, spontaneity and articulation
Normal volume, rate, productivity, spontaneity and articulation
Abnormal as indicated, otherwise normal...
Normal volume, rate, productivity, spontaneity and articulation
Normal volume, rate, productivity, spontaneity and articulation
Abnormal as indicated, otherwise normal...

## 2024-12-31 NOTE — BH INPATIENT PSYCHIATRY PROGRESS NOTE - NSTREATMENTCERTY_PSY_ALL_CORE

## 2024-12-31 NOTE — BH INPATIENT PSYCHIATRY PROGRESS NOTE - NSBHASSESSSUMMFT_PSY_ALL_CORE
Patient is a 39 year old woman with a history of Major Depression, Anxiety and Borderline Personality Disorder who presented to the ED for the evaluation of worsening depression and suicidal ideations. Patient continued to have significant depression, hopelessness, helplessness, anhedonia and continued suicidal ideations. It seems that her feelings of depression are also confounded with her feelings of dysphoria in the context of bereavement and lack of skills to cope with the loss of her father. Patient seems to have significant poor frustration tolerance and very poor impulse control. At this time, patient is considered a danger to herself and needs inpatient psychiatric hospitalization for medication management and symptom stabilization.    On evaluation, pt presents with bright affect and well-groomed, reports she is feeling better and is requesting for discharge before the new year. Pt with several somatic complaints, +URI and UTI, consulted with medical PA and recs ordered, pt agreed to f/u OP. She reports overall improved mood and anxiety, reports sleeping and eating well, denies SI/HI. She presents future-oriented, reports motivation to strengthen coping skills and f/u IOP. Has been with no self injurious or hostile behaviors. Discharge today.    #Depression unspecified; r/o MDD, r/o adjustment d/o  #Personality d/o  -c/w zoloft to 100 mg daily  -encourage groups/DBT  -c/w trazodone 50 mg po qhs prn for insomnia    #Agitation  -for agitation not amenable to verbal redirection, may give haldol 5 mg q6h prn and ativan 2 mg 6h prn with escalation to IM if pt is refusing PO and is an acute danger to self or/and others with repeat EKG to ensure QTc <500 ms

## 2024-12-31 NOTE — BH INPATIENT PSYCHIATRY PROGRESS NOTE - NSBHATTESTBILLING_PSY_A_CORE
07513-Bvvyhteuxk OBS or IP - low complexity OR 25-34 mins
69351-Qvxrvxbrsx OBS or IP - low complexity OR 25-34 mins
90966-Cflrgnfuzc OBS or IP - low complexity OR 25-34 mins
43606-Ipzhvzsdbf OBS or IP - low complexity OR 25-34 mins
64289-Xypmrskdav OBS or IP - low complexity OR 25-34 mins
24907-Xoxbnqevln OBS or IP - low complexity OR 25-34 mins

## 2024-12-31 NOTE — BH INPATIENT PSYCHIATRY PROGRESS NOTE - NSBHCONSULTIPREASON_PSY_A_CORE
Render In Strict Bullet Format?: No
Continue Regimen: doxycycline hyclate 20 mg tablet Take one tab PO QD for rosacea maintenance\\n\\nmetronidazole 0.75 % topical gel Apply to affected areas on face QHS then apply sunscreen
Detail Level: Zone
danger to self; mental illness expected to respond to inpatient care

## 2024-12-31 NOTE — BH INPATIENT PSYCHIATRY PROGRESS NOTE - NSBHROSSYSTEMS_PSY_ALL_CORE
Gastrointestinal.../Genitourinary.../Psychiatric
Psychiatric
Psychiatric
Genitourinary.../Psychiatric
Respiratory.../Psychiatric
Respiratory.../Psychiatric
Psychiatric

## 2024-12-31 NOTE — BH INPATIENT PSYCHIATRY PROGRESS NOTE - NSTXDIABGOAL_PSY_ALL_CORE
Will verbalize 2 signs and symptoms of hypo and hyperglycemia

## 2024-12-31 NOTE — BH INPATIENT PSYCHIATRY PROGRESS NOTE - NSICDXBHPRIMARYDX_PSY_ALL_CORE
Depression, unspecified depression type   F32.A  
Severe episode of recurrent major depressive disorder, without psychotic features   F33.2  
Severe episode of recurrent major depressive disorder, without psychotic features   F33.2  
Depression, unspecified depression type   F32.A  
Severe episode of recurrent major depressive disorder, without psychotic features   F33.2

## 2024-12-31 NOTE — BH INPATIENT PSYCHIATRY PROGRESS NOTE - NSICDXBHSECONDARYDX_PSY_ALL_CORE
Bereavement   Z63.4  Anxiety   F41.9  Borderline personality disorder   F60.3  
Bereavement   Z63.4  Anxiety   F41.9  Borderline personality disorder   F60.3  
Cluster B personality disorder   F60.89  
Bereavement   Z63.4  Anxiety   F41.9  Borderline personality disorder   F60.3  
Cluster B personality disorder   F60.89

## 2024-12-31 NOTE — BH INPATIENT PSYCHIATRY PROGRESS NOTE - NSBHATTESTTYPEVISIT_PSY_A_CORE
On-site Attending supervising JABARI (99XXX codes)
On-site Attending supervising JABARI (99XXX codes)
JABARI without on-site Attending supervision
On-site Attending supervising JABARI (99XXX codes)
On-site Attending supervising JABARI (99XXX codes)
Attending Only
On-site Attending supervising JABARI (99XXX codes)

## 2024-12-31 NOTE — BH INPATIENT PSYCHIATRY PROGRESS NOTE - NSTXDIABPROGRES_PSY_ALL_CORE
Called pt, stated he doesn't need an EKG it was already discussed with  [facility].    
Improving
Met - goal discontinued
No Change
Improving
No Change

## 2024-12-31 NOTE — BH INPATIENT PSYCHIATRY PROGRESS NOTE - CURRENT MEDICATION
MEDICATIONS  (STANDING):  amLODIPine   Tablet 5 milliGRAM(s) Oral daily  dextrose 5%. 1000 milliLiter(s) (50 mL/Hr) IV Continuous <Continuous>  dextrose 5%. 1000 milliLiter(s) (100 mL/Hr) IV Continuous <Continuous>  dextrose 50% Injectable 25 Gram(s) IV Push once  dextrose 50% Injectable 12.5 Gram(s) IV Push once  dextrose 50% Injectable 25 Gram(s) IV Push once  famotidine    Tablet 40 milliGRAM(s) Oral at bedtime  glucagon  Injectable 1 milliGRAM(s) IntraMuscular once  insulin glargine Injectable (LANTUS) 20 Unit(s) SubCutaneous every morning  insulin glargine Injectable (LANTUS) 54 Unit(s) SubCutaneous at bedtime  insulin lispro (ADMELOG) corrective regimen sliding scale   SubCutaneous three times a day before meals  insulin lispro Injectable (ADMELOG) 14 Unit(s) SubCutaneous three times a day with meals  metoprolol tartrate 25 milliGRAM(s) Oral three times a day  pantoprazole    Tablet 40 milliGRAM(s) Oral before breakfast  sertraline 100 milliGRAM(s) Oral daily  trimethoprim  160 mG/sulfamethoxazole 800 mG 1 Tablet(s) Oral two times a day    MEDICATIONS  (PRN):  acetaminophen     Tablet .. 650 milliGRAM(s) Oral every 6 hours PRN Temp greater or equal to 38C (100.4F), Mild Pain (1 - 3), Moderate Pain (4 - 6)  benzonatate 100 milliGRAM(s) Oral three times a day PRN Cough  dextrose Oral Gel 15 Gram(s) Oral once PRN Blood Glucose LESS THAN 70 milliGRAM(s)/deciliter  dextrose Oral Gel 15 Gram(s) Oral once PRN Blood Glucose LESS THAN 70 milliGRAM(s)/deciliter  dextrose Oral Gel 15 Gram(s) Oral once PRN Blood Glucose LESS THAN 70 milliGRAM(s)/deciliter  guaifenesin/dextromethorphan Oral Liquid 10 milliLiter(s) Oral every 6 hours PRN cough  haloperidol     Tablet 5 milliGRAM(s) Oral every 6 hours PRN agiation  methocarbamol 500 milliGRAM(s) Oral every 8 hours PRN Muscle Spasm  naproxen 500 milliGRAM(s) Oral two times a day PRN Moderate Pain (4 - 6)  ondansetron   Disintegrating Tablet 4 milliGRAM(s) Oral every 6 hours PRN Nausea  polyethylene glycol 3350 17 Gram(s) Oral every 24 hours PRN constipation  traZODone 50 milliGRAM(s) Oral at bedtime PRN insomnia

## 2024-12-31 NOTE — BH INPATIENT PSYCHIATRY PROGRESS NOTE - NSTXDIABINTERMD_PSY_ALL_CORE
Diabetic meds ordered

## 2024-12-31 NOTE — BH INPATIENT PSYCHIATRY PROGRESS NOTE - NSBHCHARTREVIEWVS_PSY_A_CORE FT
Vital Signs Last 24 Hrs  T(C): 36.8 (12-31-24 @ 08:31), Max: 36.8 (12-31-24 @ 08:31)  T(F): 98.3 (12-31-24 @ 08:31), Max: 98.3 (12-31-24 @ 08:31)  HR: 99 (12-31-24 @ 08:31) (94 - 99)  BP: 107/68 (12-31-24 @ 08:31) (107/68 - 141/77)  BP(mean): --  RR: --  SpO2: --

## 2024-12-31 NOTE — BH INPATIENT PSYCHIATRY PROGRESS NOTE - NSTXDEPRESGOAL_PSY_ALL_CORE
Will identify 2 coping skills that assist in improving mood
Exhibit improvements in self-grooming, hygiene, sleep and appetite
Will identify 2 coping skills that assist in improving mood
Exhibit improvements in self-grooming, hygiene, sleep and appetite
Will identify 2 coping skills that assist in improving mood

## 2024-12-31 NOTE — BH INPATIENT PSYCHIATRY PROGRESS NOTE - NSBHMETABOLIC_PSY_ALL_CORE_FT
BMI: BMI (kg/m2): 32.1 (12-22-24 @ 13:23)  HbA1c: A1C with Estimated Average Glucose Result: 9.5 % (12-23-24 @ 08:53)    Glucose: POCT Blood Glucose.: 280 mg/dL (12-31-24 @ 06:37)    BP: 107/68 (12-31-24 @ 08:31) (85/52 - 141/77)Vital Signs Last 24 Hrs  T(C): 36.8 (12-31-24 @ 08:31), Max: 36.8 (12-31-24 @ 08:31)  T(F): 98.3 (12-31-24 @ 08:31), Max: 98.3 (12-31-24 @ 08:31)  HR: 99 (12-31-24 @ 08:31) (94 - 99)  BP: 107/68 (12-31-24 @ 08:31) (107/68 - 141/77)  BP(mean): --  RR: --  SpO2: --      Lipid Panel: Date/Time: 12-23-24 @ 08:53  Cholesterol, Serum: 140  LDL Cholesterol Calculated: 79  HDL Cholesterol, Serum: 41  Total Cholesterol/HDL Ration Measurement: --  Triglycerides, Serum: 99

## 2024-12-31 NOTE — BH INPATIENT PSYCHIATRY PROGRESS NOTE - NSBHFUPINTERVALHXFT_PSY_A_CORE
Pt seen and evaluated, chart reviewed. As per nursing report, pt with somatic complaints and received PRNs, otherwise no other acute events. On evaluation, pt presents with bright affect, greets treatment team with a smile, well-groomed. She reports she is with improved mood and anxiety since admission, states she feels ready to go home and requesting for discharge before the new year. She reports fair appetite and sleep. She denies AVH, paranoia. She denies passive and active SI/HI. She presents future-oriented, reports motivation to attend IOP and strengthen coping skills. Has not been a behavioral concern. Anticipated discharge for tomorrow.  Spoke with pt's mother, Argelia- updated on POC and denies safety concerns on discharge.

## 2024-12-31 NOTE — BH INPATIENT PSYCHIATRY PROGRESS NOTE - NSBHFUPINTERVALCCFT_PSY_A_CORE
"Feel a bit low but ok"
"I'm ok besides the cough"
"I'm good" "ready"
"I want to be home before the New Year"
"I been depressed"
"I'm ok besides the cough"
"Ok"

## 2025-01-01 ENCOUNTER — INPATIENT (INPATIENT)
Facility: HOSPITAL | Age: 40
LOS: 4 days | Discharge: HOME | DRG: 113 | End: 2025-01-06
Attending: HOSPITALIST | Admitting: STUDENT IN AN ORGANIZED HEALTH CARE EDUCATION/TRAINING PROGRAM
Payer: MEDICAID

## 2025-01-01 VITALS
WEIGHT: 184.97 LBS | RESPIRATION RATE: 18 BRPM | TEMPERATURE: 98 F | SYSTOLIC BLOOD PRESSURE: 131 MMHG | OXYGEN SATURATION: 100 % | HEART RATE: 98 BPM | DIASTOLIC BLOOD PRESSURE: 83 MMHG | HEIGHT: 64 IN

## 2025-01-01 DIAGNOSIS — F60.3 BORDERLINE PERSONALITY DISORDER: ICD-10-CM

## 2025-01-01 LAB
ALBUMIN SERPL ELPH-MCNC: 4.5 G/DL — SIGNIFICANT CHANGE UP (ref 3.5–5.2)
ALP SERPL-CCNC: 143 U/L — HIGH (ref 30–115)
ALT FLD-CCNC: 61 U/L — HIGH (ref 0–41)
ANION GAP SERPL CALC-SCNC: 14 MMOL/L — SIGNIFICANT CHANGE UP (ref 7–14)
AST SERPL-CCNC: 48 U/L — HIGH (ref 0–41)
B-OH-BUTYR SERPL-SCNC: <0.2 MMOL/L — SIGNIFICANT CHANGE UP
BASE EXCESS BLDV CALC-SCNC: -0.9 MMOL/L — SIGNIFICANT CHANGE UP (ref -2–3)
BASOPHILS # BLD AUTO: 0 K/UL — SIGNIFICANT CHANGE UP (ref 0–0.2)
BASOPHILS NFR BLD AUTO: 0 % — SIGNIFICANT CHANGE UP (ref 0–1)
BILIRUB SERPL-MCNC: <0.2 MG/DL — SIGNIFICANT CHANGE UP (ref 0.2–1.2)
BUN SERPL-MCNC: 8 MG/DL — LOW (ref 10–20)
CA-I SERPL-SCNC: 1.22 MMOL/L — SIGNIFICANT CHANGE UP (ref 1.15–1.33)
CALCIUM SERPL-MCNC: 9.3 MG/DL — SIGNIFICANT CHANGE UP (ref 8.4–10.5)
CHLORIDE SERPL-SCNC: 98 MMOL/L — SIGNIFICANT CHANGE UP (ref 98–110)
CO2 SERPL-SCNC: 22 MMOL/L — SIGNIFICANT CHANGE UP (ref 17–32)
CREAT SERPL-MCNC: 0.6 MG/DL — LOW (ref 0.7–1.5)
EGFR: 117 ML/MIN/1.73M2 — SIGNIFICANT CHANGE UP
EOSINOPHIL # BLD AUTO: 0.15 K/UL — SIGNIFICANT CHANGE UP (ref 0–0.7)
EOSINOPHIL NFR BLD AUTO: 1.8 % — SIGNIFICANT CHANGE UP (ref 0–8)
GAS PNL BLDV: 135 MMOL/L — LOW (ref 136–145)
GAS PNL BLDV: SIGNIFICANT CHANGE UP
GAS PNL BLDV: SIGNIFICANT CHANGE UP
GIANT PLATELETS BLD QL SMEAR: PRESENT — SIGNIFICANT CHANGE UP
GLUCOSE SERPL-MCNC: 123 MG/DL — HIGH (ref 70–99)
HCO3 BLDV-SCNC: 24 MMOL/L — SIGNIFICANT CHANGE UP (ref 22–29)
HCT VFR BLD CALC: 35.1 % — LOW (ref 37–47)
HCT VFR BLDA CALC: 34 % — LOW (ref 34.5–46.5)
HGB BLD CALC-MCNC: 11.2 G/DL — LOW (ref 11.7–16.1)
HGB BLD-MCNC: 10.5 G/DL — LOW (ref 12–16)
LACTATE BLDV-MCNC: 2.6 MMOL/L — HIGH (ref 0.5–2)
LIDOCAIN IGE QN: 15 U/L — SIGNIFICANT CHANGE UP (ref 7–60)
LYMPHOCYTES # BLD AUTO: 1.61 K/UL — SIGNIFICANT CHANGE UP (ref 1.2–3.4)
LYMPHOCYTES # BLD AUTO: 19.8 % — LOW (ref 20.5–51.1)
MANUAL SMEAR VERIFICATION: SIGNIFICANT CHANGE UP
MCHC RBC-ENTMCNC: 22.3 PG — LOW (ref 27–31)
MCHC RBC-ENTMCNC: 29.9 G/DL — LOW (ref 32–37)
MCV RBC AUTO: 74.7 FL — LOW (ref 81–99)
MONOCYTES # BLD AUTO: 0.51 K/UL — SIGNIFICANT CHANGE UP (ref 0.1–0.6)
MONOCYTES NFR BLD AUTO: 6.3 % — SIGNIFICANT CHANGE UP (ref 1.7–9.3)
MYELOCYTES NFR BLD: 0.9 % — HIGH (ref 0–0)
NEUTROPHILS # BLD AUTO: 5.34 K/UL — SIGNIFICANT CHANGE UP (ref 1.4–6.5)
NEUTROPHILS NFR BLD AUTO: 65.8 % — SIGNIFICANT CHANGE UP (ref 42.2–75.2)
OVALOCYTES BLD QL SMEAR: SLIGHT — SIGNIFICANT CHANGE UP
PCO2 BLDV: 37 MMHG — LOW (ref 39–42)
PH BLDV: 7.41 — SIGNIFICANT CHANGE UP (ref 7.32–7.43)
PLAT MORPH BLD: NORMAL — SIGNIFICANT CHANGE UP
PLATELET # BLD AUTO: 519 K/UL — HIGH (ref 130–400)
PMV BLD: 10.7 FL — HIGH (ref 7.4–10.4)
PO2 BLDV: 34 MMHG — SIGNIFICANT CHANGE UP (ref 25–45)
POIKILOCYTOSIS BLD QL AUTO: SLIGHT — SIGNIFICANT CHANGE UP
POTASSIUM BLDV-SCNC: 3.7 MMOL/L — SIGNIFICANT CHANGE UP (ref 3.5–5.1)
POTASSIUM SERPL-MCNC: 4.1 MMOL/L — SIGNIFICANT CHANGE UP (ref 3.5–5)
POTASSIUM SERPL-SCNC: 4.1 MMOL/L — SIGNIFICANT CHANGE UP (ref 3.5–5)
PROT SERPL-MCNC: 7.3 G/DL — SIGNIFICANT CHANGE UP (ref 6–8)
RBC # BLD: 4.7 M/UL — SIGNIFICANT CHANGE UP (ref 4.2–5.4)
RBC # FLD: 17.5 % — HIGH (ref 11.5–14.5)
RBC BLD AUTO: ABNORMAL
SAO2 % BLDV: 53.2 % — LOW (ref 67–88)
SODIUM SERPL-SCNC: 134 MMOL/L — LOW (ref 135–146)
STOMATOCYTES BLD QL SMEAR: SLIGHT — SIGNIFICANT CHANGE UP
VARIANT LYMPHS # BLD: 5.4 % — HIGH (ref 0–5)
WBC # BLD: 8.12 K/UL — SIGNIFICANT CHANGE UP (ref 4.8–10.8)
WBC # FLD AUTO: 8.12 K/UL — SIGNIFICANT CHANGE UP (ref 4.8–10.8)

## 2025-01-01 PROCEDURE — 99285 EMERGENCY DEPT VISIT HI MDM: CPT

## 2025-01-01 PROCEDURE — 71045 X-RAY EXAM CHEST 1 VIEW: CPT | Mod: 26

## 2025-01-01 PROCEDURE — 93010 ELECTROCARDIOGRAM REPORT: CPT

## 2025-01-01 RX ORDER — ONDANSETRON 4 MG/1
4 TABLET ORAL ONCE
Refills: 0 | Status: COMPLETED | OUTPATIENT
Start: 2025-01-01 | End: 2025-01-01

## 2025-01-01 RX ORDER — SODIUM CHLORIDE 9 MG/ML
2000 INJECTION, SOLUTION INTRAVENOUS ONCE
Refills: 0 | Status: COMPLETED | OUTPATIENT
Start: 2025-01-01 | End: 2025-01-01

## 2025-01-01 RX ORDER — KETOROLAC TROMETHAMINE 30 MG/ML
30 INJECTION INTRAMUSCULAR; INTRAVENOUS ONCE
Refills: 0 | Status: DISCONTINUED | OUTPATIENT
Start: 2025-01-01 | End: 2025-01-01

## 2025-01-01 RX ADMIN — ONDANSETRON 4 MILLIGRAM(S): 4 TABLET ORAL at 20:20

## 2025-01-01 RX ADMIN — ONDANSETRON 4 MILLIGRAM(S): 4 TABLET ORAL at 23:41

## 2025-01-01 RX ADMIN — SODIUM CHLORIDE 2000 MILLILITER(S): 9 INJECTION, SOLUTION INTRAVENOUS at 20:21

## 2025-01-01 RX ADMIN — KETOROLAC TROMETHAMINE 30 MILLIGRAM(S): 30 INJECTION INTRAMUSCULAR; INTRAVENOUS at 20:21

## 2025-01-01 NOTE — ED PROVIDER NOTE - OBJECTIVE STATEMENT
39 yold female to ED Pmhx Bipolar disoreder, depression, Dm, migranes, gerd; pt presents to Ed c/o n/v, coughing, diarreha - unable to keep anything down; pt was recently admitted to hospital for [facility]; tested flu +; pt given cough meds without improvement; pt also c/o mild sore throat, chills, difficulty breathing; denies fever, abdominal pain, chest pain; David Claudio(Attending)

## 2025-01-01 NOTE — ED ADULT TRIAGE NOTE - CHIEF COMPLAINT QUOTE
BIBEMS for flu like symptoms cough, weak, nausea and phlegm, pt dx with flu 12/23 with no improvement with OTC and rx  medication.

## 2025-01-01 NOTE — ED ADULT TRIAGE NOTE - GLASGOW COMA SCALE: BEST VERBAL RESPONSE, MLM
Cardiology Clinic Progress Note  oTm Summers MRN# [id number]   YOB: 1937 Age: 87 year old      Primary Cardiologist:   Dr. Ashraf for 1 month follow-up          History of Presenting Illness:      Nonobstructive CAD   Moderate disease on angio 4/2024 50% proximal to mid LAD, 50% D1 with negative IFR  Nonischemic cardiomyopathy  EF 50 to 55% on echo 4/2024  LVEF 68%, RVEF 52% on CMR 5/2024  Etiology: Likely hypertensive  Hypertension  Hyperlipidemia  He is a retired .      Patient was seen by Dr. Ashraf in consultation in April 2024 for progressively worsening dyspnea on exertion and fatigue. Echocardiogram June 2023 showed normal biventricular function with mild to moderate LVH and ELANA. Flores scan 2019 was negative for ischemia (reporting dyspnea on exertion and atypical chest discomfort at that time). He was needing to stop and rest while walking to the mailbox and climbing stairs. Blood pressures were elevated and he was started on losartan. Further testing recommended. BNP was WNL and negative chest x-ray. Echo 4/17/2024 showing LVEF 52%, wall motion not well-visualized and no significant valvular disease.  Nonobstructive CAD on coronary angiogram.  Dyspnea on exertion thought to be related to uncontrolled hypertension. Stress cardiac MRI May 2024 was negative for ischemia, LVEF 68%, no MI, scar, infiltrative disease, RVEF 52%, ELANA. Zio patch showed sinus rhythm with frequent PACs 10% and occasional PVCs.  Referral to pulmonology was recommended.    Patient was seen by me in June 2024.  He had misunderstood and when his medications prescribed by the hospital ran out he did not get these refilled so was not taking cardiac medications.  Blood pressures were elevated.  He did not have any heart failure symptoms and weights were stable.  His dyspnea on exertion had reportedly improved back to his baseline.    Patient was seen by pulmonology in June 2024.  Chest imaging and PFTs were normal and he  was referred to pulmonary rehab.  They felt the shortness of breath was multifactorial involving age, deconditioning, hemoglobin, cardiac disease.  Note reviewed today.      Most recent lipid profile, BMP, ALT reviewed today.    Restarted medications? Yes   Weight? Stable   Heart failure symptoms? None   BP? Elevated   Angina? None   LERMA  stable     Patient reports no chest pain, PND, orthopnea, presyncope, syncope, edema, heart racing, or palpitations.                    Assessment and Plan:     Plan  Patient Instructions   Medication Changes:  Increase losartan to 100 mg daily for HTN    Recommendations:  Check daily weights and call the clinic if your weight has increased more than 2 lbs in one day or 5 lbs in one week; if you feel more short of breath or have worsening swelling in your legs or abdomen.   Check blood pressure at least 1 hour after medications. Call the clinic if your blood pressure is consistently greater than 130/80.    Call if blood pressure is less than 90 on top or less than 100 with lightheadedness.       Follow-up:   Nonfasting lab in 1-2 weeks (BMP)  Cardiology follow up at [facility]: jesús in 2 months to reassess HTN    Cardiology Scheduling~[phone]  Cardiology Clinic RN~[phone] (Claire RN, Mayelin RN; Melva RN)          Problem List as of 8/1/2024            Noted       Active Problems    1. Hyperlipidemia LDL goal <70 4/19/2024     Relevant Orders     Follow-Up with Cardiology    2. Nonobstructive atherosclerosis of coronary artery 8/1/2024     Relevant Orders     Follow-Up with Cardiology    3. History of cardiomyopathy 8/1/2024     Relevant Orders     Follow-Up with Cardiology    4. Benign essential hypertension 8/1/2024     Relevant Medications     losartan (COZAAR) 100 MG tablet     Other Relevant Orders     Basic metabolic panel     Follow-Up with Cardiology      Nonobstructive atherosclerosis of coronary artery  No angina  Continue GDMT     History of  cardiomyopathy  EF normalized  Continue ARB, beta-blocker     Benign essential hypertension  Not well Controlled  Continue current medications     Hyperlipidemia  LDL at goal  Continue statin    LERMA   Neg pulmonary work-up  Stable   No HF symptoms   Work on routine exercise       Respiratory:  clear to auscultation; normal symmetry        Cardiac: regular rate and rhythm     GI:  abdomen nondistended     Extremities and Muscular Skeletal:   no edema                Thank you for allowing me to participate in this delightful patient's care.   This note was completed in part using Dragon voice recognition software. Although reviewed after completion, some word and grammatical errors may occur.    FARHAD Page CNP               (V5) oriented

## 2025-01-01 NOTE — ED PROVIDER NOTE - PHYSICAL EXAMINATION
Constitutional: Well developed, well nourished. NAD  Head: Normocephalic, atraumatic.  Eyes: PERRL, EOMI.  ENT: No nasal discharge. Mucous membranes dry.  Neck: Supple. Painless ROM.  Cardiovascular:  Regular rate and rhythm.  .  Pulmonary:  Lungs clear to auscultation bilaterally.    Abdominal: Soft. Nondistended. No rebound, guarding, rigidity.  Extremities. Pelvis stable. No lower extremity edema, symmetric calves.  Skin: No rashes, cyanosis.  Neuro: AAOx3. No focal neurological deficits.  Psych: Normal mood. Normal affect.

## 2025-01-01 NOTE — ED PROVIDER NOTE - ATTENDING APP SHARED VISIT CONTRIBUTION OF CARE
39-year-old female history of portal depression disorder, depression, DM, chronic migraines, GERD, HTN presents for evaluation of over 1 week of URI symptoms.  Patient reports coughing and associated pleuritic chest pain, body aches, vomiting and being unable to tolerate fluids, loose bowel movements.  Patient was diagnosed with flu and RSV on December 23 and was getting prescribed benzonatate, naproxen and Robaxin.  VSS, non toxic appearing, NAD, Head NCAT, MMM, pharynx without any erythema, edema exudates, bilateral TM with full with clear effusions, no erythema dullness, neck supple, normal ROM, normal s1s2, lungs ctab, abd s/nt/nd, no guarding or rebound, extremities wnl, AAO x 3, GCS 15, neuro grossly normal. No acute skin lesions. Plan is IV, labs, meds, imaging and reassess.

## 2025-01-01 NOTE — ED PROVIDER NOTE - CLINICAL SUMMARY MEDICAL DECISION MAKING FREE TEXT BOX
PT with recent diagnosis of flu and rsv presents for evaluation of persistent conughing. Also reports diarrhea and weakness. Required labs, imaging and meds. Will admit for further evaluation and management,

## 2025-01-02 DIAGNOSIS — J11.1 INFLUENZA DUE TO UNIDENTIFIED INFLUENZA VIRUS WITH OTHER RESPIRATORY MANIFESTATIONS: ICD-10-CM

## 2025-01-02 LAB
FLUAV AG NPH QL: SIGNIFICANT CHANGE UP
FLUBV AG NPH QL: SIGNIFICANT CHANGE UP
GLUCOSE BLDC GLUCOMTR-MCNC: 104 MG/DL — HIGH (ref 70–99)
GLUCOSE BLDC GLUCOMTR-MCNC: 49 MG/DL — CRITICAL LOW (ref 70–99)
GLUCOSE BLDC GLUCOMTR-MCNC: 91 MG/DL — SIGNIFICANT CHANGE UP (ref 70–99)
GLUCOSE BLDC GLUCOMTR-MCNC: 92 MG/DL — SIGNIFICANT CHANGE UP (ref 70–99)
RSV RNA NPH QL NAA+NON-PROBE: SIGNIFICANT CHANGE UP
SARS-COV-2 RNA SPEC QL NAA+PROBE: SIGNIFICANT CHANGE UP

## 2025-01-02 PROCEDURE — 36415 COLL VENOUS BLD VENIPUNCTURE: CPT

## 2025-01-02 PROCEDURE — 83735 ASSAY OF MAGNESIUM: CPT

## 2025-01-02 PROCEDURE — 83540 ASSAY OF IRON: CPT

## 2025-01-02 PROCEDURE — 80048 BASIC METABOLIC PNL TOTAL CA: CPT

## 2025-01-02 PROCEDURE — 80053 COMPREHEN METABOLIC PANEL: CPT

## 2025-01-02 PROCEDURE — 83550 IRON BINDING TEST: CPT

## 2025-01-02 PROCEDURE — 93010 ELECTROCARDIOGRAM REPORT: CPT

## 2025-01-02 PROCEDURE — 82728 ASSAY OF FERRITIN: CPT

## 2025-01-02 PROCEDURE — 87086 URINE CULTURE/COLONY COUNT: CPT

## 2025-01-02 PROCEDURE — 99222 1ST HOSP IP/OBS MODERATE 55: CPT

## 2025-01-02 PROCEDURE — 81001 URINALYSIS AUTO W/SCOPE: CPT

## 2025-01-02 PROCEDURE — 99253 IP/OBS CNSLTJ NEW/EST LOW 45: CPT | Mod: GC

## 2025-01-02 PROCEDURE — 87507 IADNA-DNA/RNA PROBE TQ 12-25: CPT

## 2025-01-02 PROCEDURE — 85025 COMPLETE CBC W/AUTO DIFF WBC: CPT

## 2025-01-02 PROCEDURE — 93005 ELECTROCARDIOGRAM TRACING: CPT

## 2025-01-02 PROCEDURE — 85027 COMPLETE CBC AUTOMATED: CPT

## 2025-01-02 PROCEDURE — 82962 GLUCOSE BLOOD TEST: CPT

## 2025-01-02 PROCEDURE — 0241U: CPT

## 2025-01-02 RX ORDER — SULFAMETHOXAZOLE/TRIMETHOPRIM 800-160 MG
1 TABLET ORAL EVERY 12 HOURS
Refills: 0 | Status: DISCONTINUED | OUTPATIENT
Start: 2025-01-02 | End: 2025-01-03

## 2025-01-02 RX ORDER — SERTRALINE HYDROCHLORIDE 25 MG/1
100 TABLET ORAL DAILY
Refills: 0 | Status: DISCONTINUED | OUTPATIENT
Start: 2025-01-02 | End: 2025-01-06

## 2025-01-02 RX ORDER — ENOXAPARIN SODIUM 60 MG/.6ML
40 INJECTION INTRAVENOUS; SUBCUTANEOUS ONCE
Refills: 0 | Status: COMPLETED | OUTPATIENT
Start: 2025-01-02 | End: 2025-01-02

## 2025-01-02 RX ORDER — GINKGO BILOBA 40 MG
3 CAPSULE ORAL AT BEDTIME
Refills: 0 | Status: DISCONTINUED | OUTPATIENT
Start: 2025-01-02 | End: 2025-01-06

## 2025-01-02 RX ORDER — BENZONATATE 100 MG
100 CAPSULE ORAL THREE TIMES A DAY
Refills: 0 | Status: DISCONTINUED | OUTPATIENT
Start: 2025-01-02 | End: 2025-01-06

## 2025-01-02 RX ORDER — PANTOPRAZOLE 40 MG/1
40 TABLET, DELAYED RELEASE ORAL
Refills: 0 | Status: DISCONTINUED | OUTPATIENT
Start: 2025-01-02 | End: 2025-01-06

## 2025-01-02 RX ORDER — METOPROLOL TARTRATE 50 MG
25 TABLET ORAL THREE TIMES A DAY
Refills: 0 | Status: DISCONTINUED | OUTPATIENT
Start: 2025-01-02 | End: 2025-01-06

## 2025-01-02 RX ORDER — GUAIFENESIN 100 MG/5ML
600 SYRUP ORAL EVERY 12 HOURS
Refills: 0 | Status: DISCONTINUED | OUTPATIENT
Start: 2025-01-02 | End: 2025-01-05

## 2025-01-02 RX ORDER — SODIUM CHLORIDE 9 MG/ML
1000 INJECTION, SOLUTION INTRAVENOUS
Refills: 0 | Status: DISCONTINUED | OUTPATIENT
Start: 2025-01-02 | End: 2025-01-03

## 2025-01-02 RX ORDER — ACETAMINOPHEN 80 MG/.8ML
650 SOLUTION/ DROPS ORAL EVERY 6 HOURS
Refills: 0 | Status: DISCONTINUED | OUTPATIENT
Start: 2025-01-02 | End: 2025-01-06

## 2025-01-02 RX ORDER — SODIUM CHLORIDE 9 MG/ML
1000 INJECTION, SOLUTION INTRAVENOUS
Refills: 0 | Status: DISCONTINUED | OUTPATIENT
Start: 2025-01-02 | End: 2025-01-02

## 2025-01-02 RX ORDER — ONDANSETRON 4 MG/1
4 TABLET ORAL EVERY 8 HOURS
Refills: 0 | Status: DISCONTINUED | OUTPATIENT
Start: 2025-01-02 | End: 2025-01-06

## 2025-01-02 RX ADMIN — Medication 600 MILLIGRAM(S): at 11:21

## 2025-01-02 RX ADMIN — ACETAMINOPHEN 650 MILLIGRAM(S): 80 SOLUTION/ DROPS ORAL at 10:03

## 2025-01-02 RX ADMIN — SODIUM CHLORIDE 75 MILLILITER(S): 9 INJECTION, SOLUTION INTRAVENOUS at 17:51

## 2025-01-02 RX ADMIN — SODIUM CHLORIDE 75 MILLILITER(S): 9 INJECTION, SOLUTION INTRAVENOUS at 05:42

## 2025-01-02 RX ADMIN — Medication 100 MILLIGRAM(S): at 11:20

## 2025-01-02 RX ADMIN — SERTRALINE HYDROCHLORIDE 100 MILLIGRAM(S): 25 TABLET ORAL at 15:27

## 2025-01-02 RX ADMIN — ENOXAPARIN SODIUM 40 MILLIGRAM(S): 60 INJECTION INTRAVENOUS; SUBCUTANEOUS at 17:50

## 2025-01-02 RX ADMIN — Medication 25 MILLIGRAM(S): at 21:47

## 2025-01-02 RX ADMIN — Medication 25 MILLIGRAM(S): at 05:43

## 2025-01-02 RX ADMIN — PANTOPRAZOLE 40 MILLIGRAM(S): 40 TABLET, DELAYED RELEASE ORAL at 08:30

## 2025-01-02 RX ADMIN — SODIUM CHLORIDE 75 MILLILITER(S): 9 INJECTION, SOLUTION INTRAVENOUS at 21:47

## 2025-01-02 RX ADMIN — Medication 5 MILLIGRAM(S): at 05:42

## 2025-01-02 RX ADMIN — Medication 1 TABLET(S): at 17:50

## 2025-01-02 RX ADMIN — Medication 25 MILLIGRAM(S): at 14:17

## 2025-01-02 NOTE — H&P ADULT - NSHPREVIEWOFSYSTEMS_GEN_ALL_CORE
REVIEW OF SYSTEMS:    CONSTITUTIONAL:  No weakness, fevers or chills  EYES/ENT:  No visual changes;  No vertigo or throat pain   NECK:  No pain or stiffness  RESPIRATORY:  +cough, congestion   CARDIOVASCULAR:  No chest pain or palpitations  GASTROINTESTINAL:  No abdominal or epigastric pain. +NVD   GENITOURINARY:  No dysuria, frequency or hematuria  NEUROLOGICAL:  No numbness or weakness  SKIN:  No itching, rashes

## 2025-01-02 NOTE — H&P ADULT - ATTENDING COMMENTS
39 year old female with PMH of DM1 (insulin pump),depression, anxiety ,asthma, GERD, HTN and obesity who presents to the hospital for a one week history of nausea, vomiting and diarrhea    #Nausea/Vomiting/Loose stool  #Flu A  suspect due to viral gastroenteritis  Possibly side effect of zoloft  Dx with flu 12/23 when pt was admitted under psych. She was discharged on 12/31, but could not tolerate any food/meds at home  - Cont zoloft for now  - IVF  - Zofran PRN  - Monitor oral intake. If nausea sxs improved by tomorrow, likely DC  - If n/v persists, recall psych tomorrow to assess decreasing dose of zoloft    #DM1  -patient has insulin pump  - Endo on board for pump management    #HTN  -c/w amlodipine   -c/w metoprolol (patient says she was prescribed it by Dr. Hopper for sinus tachycardia)     #UTI  -Patient reports having dysuria during recent admission to psych and sent home with bactrim  - Complete bactrim DS BID for 5d as prescribed    DVT, Lovenox    #Progress Note Handoff  Pending (specify): Monitor oral intake  Family discussion: dw pt regarding plan of care  Disposition: GMF, from home

## 2025-01-02 NOTE — H&P ADULT - NSHPLABSRESULTS_GEN_ALL_CORE
10.5   8.12  )-----------( 519      ( 01 Jan 2025 20:24 )             35.1       01-01    134[L]  |  98  |  8[L]  ----------------------------<  123[H]  4.1   |  22  |  0.6[L]    Ca    9.3      01 Jan 2025 20:24    TPro  7.3  /  Alb  4.5  /  TBili  <0.2  /  DBili  x   /  AST  48[H]  /  ALT  61[H]  /  AlkPhos  143[H]  01-01              Urinalysis Basic - ( 01 Jan 2025 20:24 )    Color: x / Appearance: x / SG: x / pH: x  Gluc: 123 mg/dL / Ketone: x  / Bili: x / Urobili: x   Blood: x / Protein: x / Nitrite: x   Leuk Esterase: x / RBC: x / WBC x   Sq Epi: x / Non Sq Epi: x / Bacteria: x            Lactate Trend            CAPILLARY BLOOD GLUCOSE      POCT Blood Glucose.: 280 mg/dL (31 Dec 2024 06:37)                        >>Radiology<<    CXR  Xray Chest 1 View- PORTABLE-Urgent:   ACC: [id number] EXAM:  XR CHEST PORTABLE URGENT 1V   ORDERED BY: PETRA DANIELS     PROCEDURE DATE:  01/01/2025          INTERPRETATION:  Clinical History / Reason for exam: cough    Comparison : Chest radiograph: December 23 2024    Technique/Positioning: Frontal view of the chest    Findings/  impression:    Support devices: None.    Cardiac/mediastinum/hilum: No significant change    Skeleton/soft tissues: No significant change.    Lung parenchyma/Pleura: There is no evidence of focal consolidation,   pleural effusion or pneumothorax.    --- End of Report ---            JOANNA VIERA MD; Attending Radiologist  This document has been electronically signed. Jan 2 2025 12:50AM (01-01-25 @ 20:38)      CT

## 2025-01-02 NOTE — ED ADULT NURSE NOTE - SUICIDE SCREENING QUESTION 3
----- Message from Staci Wu sent at 8/1/2022 11:54 AM CDT -----  Contact: [phone] Patient  Pt is requesting a call back about r/s her 08/16 appt and about labs.     
[facility] orders [facility] for [facility].   Thanks,  roni   
Pt would like labs schedule through [facility] for upcoming appointmnet  
No

## 2025-01-02 NOTE — CONSULT NOTE ADULT - ASSESSMENT
39 year old female with PMH of DM1 (insulin pump),depression, anxiety ,asthma, GERD, HTN and obesity who presents to the hospital for a one week history of nausea, vomiting and diarrhea. Patient was recently admitted to [facility] hospital for suicidal ideation (12/21-12/29). During that hospitalization patient started to experience loose stools and decreased appetite and was found to have influenza. This admission, pt was found to have hypoglycemia on her insulin pump. Endocrine consulted to adjust the pump.      #DM t1 on insulin pump      - Pump settings basal rate             1 - 12am to 12pm 2.75U/hr             2 - 12pm to 4pm 3.5U/hr   39 year old female with PMH of DM1 (insulin pump),depression, anxiety ,asthma, GERD, HTN and obesity who presents to the hospital for a one week history of nausea, vomiting and diarrhea. Patient was recently admitted to [facility] hospital for suicidal ideation (12/21-12/29). During that hospitalization patient started to experience loose stools and decreased appetite and was found to have influenza. This admission, pt was found to have hypoglycemia on her insulin pump. Endocrine consulted to adjust the pump.    #Hypoglcemia episodes on insulin pump   #DM t1 on insulin pump      - A1c - 9.5 (12/23/24)      - Pump settings basal rate             1 - 12am to 12pm 2.75U/hr             2 - 12pm to 4pm 3.5U/hr              3 - 4pm to 12am 3.75U/hr       - Bolus doses during meals and snacks       RECOMMENDATIONS  - Insulin settings changed by endo team to                1 - 12am to 12pm 2.65U/hr             2 - 12pm to 4pm 3.4U/hr              3 - 4pm to 12am 3.65U/hr                - c/w Bolus doses during meals and snacks   - Diet as tolerated   - f/u Endocrine office after discharge.      39 year old female with PMH of DM1 (insulin pump),depression, anxiety ,asthma, GERD, HTN and obesity who presents to the hospital for a one week history of nausea, vomiting and diarrhea. Patient was recently admitted to [facility] hospital for suicidal ideation (12/21-12/29). During that hospitalization patient started to experience loose stools and decreased appetite and was found to have influenza. This admission, pt was found to have hypoglycemia on her insulin pump. Endocrine consulted to adjust the pump.    Hypoglycemia episodes on insulin pump   #DM t1 on insulin pump      - A1c - 9.5 (12/23/24)      - Pump settings basal rate             1 - 12am to 12pm 2.75U/hr             2 - 12pm to 4pm 3.5U/hr              3 - 4pm to 12am 3.75U/hr       - Bolus doses during meals and snacks       RECOMMENDATIONS  - Insulin settings changed by endo team to                1 - 12am to 12pm 2.65U/hr             2 - 12pm to 4pm 3.4U/hr              3 - 4pm to 12am 3.65U/hr                - c/w Bolus doses during meals and snacks   - Diet as tolerated   - f/u Endocrine office after discharge.

## 2025-01-02 NOTE — ED ADULT NURSE NOTE - NSFALLUNIVINTERV_ED_ALL_ED
Bed/Stretcher in lowest position, wheels locked, appropriate side rails in place/Call bell, personal items and telephone in reach/Instruct patient to call for assistance before getting out of bed/chair/stretcher/Non-slip footwear applied when patient is off stretcher/[place] to call system/Physically safe environment - no spills, clutter or unnecessary equipment/Purposeful proactive rounding/Room/bathroom lighting operational, light cord in reach

## 2025-01-02 NOTE — H&P ADULT - HISTORY OF PRESENT ILLNESS
39 year old female with PMH of DM1 (insulin pump),depression, anxiety ,asthma, GERD, HTN and obesity who presents to the hospital for a one week history of nausea, vomiting and diarrhea. Patient was recently admitted to [facility] hospital for suicidal ideation (12/21-12/29). During that hospitalization patient started to experience loose stools and decreased appetite and was found to have influenza. Of note patient is on zoloft and had her dose increased from 50mg daily to 75mg and eventually 100mg daily which also coincides with the onset of her symptoms. Patient denies any fever, chills, shortness of breath or abdominal pain. Patient does endorse cough    ED Course     Vitals:    Interventions; Zofran 4mgx2 and LR 2L Bolus      39 year old female with PMH of DM1 (insulin pump),depression, anxiety ,asthma, GERD, HTN and obesity who presents to the hospital for a one week history of nausea, vomiting and diarrhea. Patient was recently admitted to [facility] hospital for suicidal ideation (12/21-12/29). During that hospitalization patient started to experience loose stools and decreased appetite and was found to have influenza. Of note patient is on zoloft and had her dose increased from 50mg daily to 75mg and eventually 100mg daily which also coincides with the onset of her symptoms. Patient denies any fever, chills, shortness of breath or abdominal pain. Patient does endorse cough    ED Course     Vitals: Tmax:97.9, , /71    Interventions; Zofran 4mgx2 and LR 2L Bolus

## 2025-01-02 NOTE — ED ADULT NURSE REASSESSMENT NOTE - NS ED NURSE REASSESS COMMENT FT1
Report received from RN. Pt assessed. Pt A&Ox4. Pt has no complaints of pain or discomfort at this time. Pt updated on plan of care. Will update as needed.

## 2025-01-02 NOTE — CONSULT NOTE ADULT - ATTENDING COMMENTS
39 year old female with PMH of DM1 (insulin pump),depression, anxiety ,asthma, GERD, HTN and obesity who presents to the hospital for a one week history of nausea, vomiting and diarrhea. Patient was recently admitted to [facility] hospital for suicidal ideation (12/21-12/29). During that hospitalization patient started to experience loose stools and decreased appetite and was found to have influenza. This admission, pt was found to have hypoglycemia on her insulin pump. Endocrine consulted to adjust the pump.    # Hypoglycemia / type 1 DM   - A1c - 9.5 (12/23/24) on medtronic insulin pump with following settings       - Pump settings basal rate             1 - 12am to 12pm 2.75U/hr             2 - 12pm to 4pm 3.5U/hr              3 - 4pm to 12am 3.75U/hr   -now as eating less having  hypoglycemic episodes, basal rates changed as follow               1 - 12am to 12pm 2.65U/hr             2 - 12pm to 4pm 3.4U/hr              3 - 4pm to 12am 3.65U/hr                - c/w Bolus doses during meals and snacks , discussed if not having carbs no boluses and also waiting to make sure she tolerates food and possible bolusing post meals   - can keep her insulin pump for now

## 2025-01-02 NOTE — PATIENT PROFILE ADULT - FALL HARM RISK - HARM RISK INTERVENTIONS

## 2025-01-02 NOTE — ED ADULT NURSE NOTE - COVID-19 ORDERING FACILITY
[id number]     Javier Canales MRN: [id number]  AGE: 80 year old  ADMIT DATE: 9/2/2023    CODE STATUS: Full Resuscitation  ISOLATION STATUS: No active isolations   DIET: Regular Diet    ALLERGIES:  Contrast media and Wound dressing adhesive     Active Hospital Problems    *Hypoxemia        Att: Higinio Scruggs MD  PCP: Higinio Scruggs MD  IP Consult Orders (From admission, onward)     Start     Ordered    09/03/23 1118  Inpatient consult to Internal Medicine  ONE TIME        Provider:  Higinio Scruggs MD    09/03/23 1118    09/03/23 0913  Inpatient consult to Oncology  ONE TIME        Provider:  Dee Bauman MD    09/03/23 0917    09/02/23 1354  Inpatient consult to Pulmonology  ONE TIME        Provider:  Darshan Kessler MD    09/02/23 1353                    BP: 108/68  Temp: 97.9 °F (36.6 °C)  Temp src: Oral  Heart Rate: 86  Resp: 18  SpO2: 92 %  Height: 5' 7\" (170.2 cm)  Weight: 70.3 kg (155 lb)   Weight change:      No results available in last 24 hours     Creatinine (mg/dL)   Date Value   09/03/2023 1.03   09/02/2023 1.29 (H)     PTT (sec)   Date Value   11/01/2022 22     INR (no units)   Date Value   11/01/2022 1.0     WBC (K/mcL)   Date Value   09/03/2023 12.3 (H)   09/02/2023 11.9 (H)        No intake/output data recorded.                         IMPORTANT EVENTS THIS SHIFT: Patient alert and oriented. On 5 L oxygen via nasal cannula. Oxygen saturations dropped to   77% when patient up to bathroom or when he removes cannula from nares. Patient requesting probiotic. Dr Scruggs notified. Received orders to start medication in AM. Patient updated. No acute events this shift   IMPORTANT EVENTS COMING UP/GOALS (PLEASE INCLUDE WHITE BOARD AND DISCHARGE BOARD UPDATES):       PATIENT SPECIAL NEEDS/ACCOMMODATIONS:                                [facility]

## 2025-01-02 NOTE — H&P ADULT - ASSESSMENT
39 year old female with PMH of DM1 (insulin pump),depression, anxiety ,asthma, GERD, HTN and obesity who presents to the hospital for a one week history of nausea, vomiting and diarrhea      #SSRI adverse reaction vs URTI   -NVD onset coincides with both her influenza ddx and medication increase  -Behavioral health consult for SSRI adjustment   -Outside the window for tamiflu  -c/w supportive care  -gentle hydration with LR @75CC   - 39 year old female with PMH of DM1 (insulin pump),depression, anxiety ,asthma, GERD, HTN and obesity who presents to the hospital for a one week history of nausea, vomiting and diarrhea      #SSRI adverse reaction vs URTI   -NVD onset coincides with both her influenza ddx and medication increase  -Behavioral health consult for SSRI adjustment   -Outside the window for tamiflu  -c/w supportive care  -gentle hydration with LR @75CC   -Zofran for antiemetics monitor QTC       #DM1  -patient has their own insulin pump  -mother will bring refills in AM 39 year old female with PMH of DM1 (insulin pump),depression, anxiety ,asthma, GERD, HTN and obesity who presents to the hospital for a one week history of nausea, vomiting and diarrhea      #SSRI adverse reaction vs URTI   -NVD onset coincides with both her influenza ddx and medication increase  -Behavioral health consult for SSRI adjustment   -Outside the window for tamiflu  -c/w supportive care  -gentle hydration with LR @75CC   -Zofran for antiemetics monitor QTC       #DM1  -patient has their own insulin pump  -mother will bring refills in AM    #HTN  -c/w amlodipine   -c/w metoprolol (patient says she was prescribed it by Dr. Hopper for sinus tachycardia)  -will get EKG to confirm no underlying arrythmia       #UTI?  -Patient reports having dysuria during recent admission  -U/A from hospitalization dirty  -will resend U/A  39 year old female with PMH of DM1 (insulin pump),depression, anxiety ,asthma, GERD, HTN and obesity who presents to the hospital for a one week history of nausea, vomiting and diarrhea      #SSRI adverse reaction vs URTI   -NVD onset coincides with both her influenza ddx and medication increase  -Behavioral health consult for SSRI adjustment   -Outside the window for tamiflu  -c/w supportive care  -gentle hydration with LR @75CC   -Zofran for antiemetics monitor QTC       #DM1  -patient has their own insulin pump  -mother will bring refills in AM    #HTN  -c/w amlodipine   -c/w metoprolol (patient says she was prescribed it by Dr. Hopper for sinus tachycardia)  -will get EKG to confirm no underlying arrythmia       #UTI?  -Patient reports having dysuria during recent admission  -U/A from hospitalization dirty  -will resend U/A       MISC  DVT: Lovenox  GI: Pantoprazole 40 qd  Diet: Carb Consistent   Activity: IAT

## 2025-01-02 NOTE — CONSULT NOTE ADULT - SUBJECTIVE AND OBJECTIVE BOX
ENDOCRINE INITIAL CONSULT -     HPI:  39 year old female with PMH of DM1 (insulin pump),depression, anxiety ,asthma, GERD, HTN and obesity who presents to the hospital for a one week history of nausea, vomiting and diarrhea. Patient was recently admitted to [facility] hospital for suicidal ideation (12/21-12/29). During that hospitalization patient started to experience loose stools and decreased appetite and was found to have influenza. Of note patient is on zoloft and had her dose increased from 50mg daily to 75mg and eventually 100mg daily which also coincides with the onset of her symptoms. Patient denies any fever, chills, shortness of breath or abdominal pain. Patient does endorse cough      Vitals: Tmax:97.9, , /71    Interventions; Zofran 4mgx2 and LR 2L Bolus      (02 Jan 2025 02:37)      ENDOCRINE HISTORY   Pt admitted for nausea, vomiting and diarrhea. Was found to have hypoglycemia on her insulin pump. Endocrine consulted to adjust the pump.    PAST MEDICAL & SURGICAL HISTORY:  Neuropathy  right lower extremity, vaginal      Type 1 diabetes      Degenerative disc disease, thoracic  Urinary tract infection, recurrent  Gastroesophageal reflux disease, esophagitis presence not specified  Intractable headache, unspecified chronicity pattern, unspecified headache type  Tremor of right hand  Tachycardia  Spinal stenosis    No significant past surgical history      Home Medications:  famotidine 40 mg oral tablet: 1 tab(s) orally once a day (at bedtime) (10 Dec 2024 09:54)  methocarbamol 500 mg oral tablet: 1 tab(s) orally every 8 hours As needed Muscle Spasm (30 Dec 2024 16:08)  metoprolol tartrate 25 mg oral tablet: 1 tab(s) orally 3 times a day (10 Dec 2024 09:54)  naproxen 500 mg oral tablet: 1 tab(s) orally every 12 hours As needed pain (10 Dec 2024 09:54)  Norvasc 5 mg oral tablet: 1 tab(s) orally once a day (07 Nov 2024 22:03)  omeprazole 40 mg oral delayed release capsule: 1 cap(s) orally once a day (04 Dec 2024 09:08)      MEDICATIONS  (STANDING):  amLODIPine   Tablet 5 milliGRAM(s) Oral daily  enoxaparin Injectable 40 milliGRAM(s) SubCutaneous once  lactated ringers. 1000 milliLiter(s) (75 mL/Hr) IV Continuous <Continuous>  metoprolol tartrate 25 milliGRAM(s) Oral three times a day  pantoprazole    Tablet 40 milliGRAM(s) Oral before breakfast  trimethoprim  160 mG/sulfamethoxazole 800 mG 1 Tablet(s) Oral every 12 hours    MEDICATIONS  (PRN):  acetaminophen     Tablet .. 650 milliGRAM(s) Oral every 6 hours PRN Mild Pain (1 - 3)  benzonatate 100 milliGRAM(s) Oral three times a day PRN Cough  guaiFENesin  milliGRAM(s) Oral every 12 hours PRN Cough  melatonin 3 milliGRAM(s) Oral at bedtime PRN Insomnia  ondansetron Injectable 4 milliGRAM(s) IV Push every 8 hours PRN Nausea and/or Vomiting      Allergies    penicillins (Hives)  Ceclor (Rash)  Fluad 0.5 ML ODETTE (Unknown)  amoxicillin (Hives)  clindamycin (Hives; Nephrotoxicity)  Clindamycin Phosphate (Unknown)    Intolerances    Influenza Virus Vaccine, H5N1, Inactivated (Other)  gabapentin (Other)  Cipro (Other)      REVIEW OF SYSTEMS  Constitutional: No fever  Eyes: No blurry vision  Neuro: No tremors  HEENT: No pain  Cardiovascular: No chest pain, palpitations  Respiratory: No SOB, no cough  GI: No nausea, vomiting, abdominal pain  : No dysuria  Skin: no rash  Psych: no depression  Endocrine: no polyuria, polydipsia  Hem/lymph: no swelling  Osteoporosis: no fractures    PHYSICAL EXAM   Vital Signs Last 24 Hrs  T(C): 36.6 (01 Jan 2025 21:15), Max: 36.7 (01 Jan 2025 19:31)  T(F): 97.9 (01 Jan 2025 21:15), Max: 98.1 (01 Jan 2025 19:31)  HR: 91 (02 Jan 2025 05:44) (91 - 100)  BP: 147/81 (02 Jan 2025 05:44) (131/83 - 147/81)  BP(mean): --  RR: 18 (02 Jan 2025 05:44) (18 - 20)  SpO2: 98% (02 Jan 2025 05:44) (98% - 100%)    Parameters below as of 02 Jan 2025 05:44  Patient On (Oxygen Delivery Method): room air      GENERAL: NAD, well-groomed, well-developed  EYES: No proptosis, no lid lag, anicteric  HEENT:  Atraumatic, Normocephalic, moist mucous membranes  THYROID: Normal size, no palpable nodules  RESPIRATORY: Clear to auscultation bilaterally; No rales, rhonchi, wheezing  CARDIOVASCULAR: Regular rate and rhythm; No murmurs  GI: Soft, nontender, non distended, normal bowel sounds  SKIN: Dry, intact  MUSCULOSKELETAL: Moving extremities spontaneously, no peripheral edema  NEURO: sensation intact, extraocular movements intact, no tremor  PSYCH: Answering questions appropriately, normal affect, normal mood  CUSHING'S SIGNS: no striae, no acanthosis, no dorsocervical fatpad    CAPILLARY BLOOD GLUCOSE      POCT Blood Glucose.: 91 mg/dL (02 Jan 2025 08:12)  POCT Blood Glucose.: 49 mg/dL (02 Jan 2025 07:34)      A1C with Estimated Average Glucose Result: 9.5 % (12-23-24 @ 08:53)  A1C with Estimated Average Glucose Result: 10.0 % (11-08-24 @ 06:35)                            10.5   8.12  )-----------( 519      ( 01 Jan 2025 20:24 )             35.1       01-01    134[L]  |  98  |  8[L]  ----------------------------<  123[H]  4.1   |  22  |  0.6[L]    Ca    9.3      01 Jan 2025 20:24    TPro  7.3  /  Alb  4.5  /  TBili  <0.2  /  DBili  x   /  AST  48[H]  /  ALT  61[H]  /  AlkPhos  143[H]  01-01      Thyroid Stimulating Hormone, Serum: 0.66 uIU/mL (12-23-24 @ 08:53)     ENDOCRINE INITIAL CONSULT -     HPI:  39 year old female with PMH of DM1 (insulin pump),depression, anxiety ,asthma, GERD, HTN and obesity who presents to the hospital for a one week history of nausea, vomiting and diarrhea. Patient was recently admitted to [facility] hospital for suicidal ideation (12/21-12/29). During that hospitalization patient started to experience loose stools and decreased appetite and was found to have influenza. Of note patient is on zoloft and had her dose increased from 50mg daily to 75mg and eventually 100mg daily which also coincides with the onset of her symptoms. Patient denies any fever, chills, shortness of breath or abdominal pain. Patient does endorse cough      Vitals: Tmax:97.9, , /71    Interventions; Zofran 4mgx2 and LR 2L Bolus      (02 Jan 2025 02:37)      ENDOCRINE HISTORY   Pt admitted for nausea, vomiting and diarrhea. Was found to have hypoglycemia on her insulin pump. Endocrine consulted to adjust the pump.    PAST MEDICAL & SURGICAL HISTORY:  Neuropathy  right lower extremity, vaginal      Type 1 diabetes      Degenerative disc disease, thoracic  Urinary tract infection, recurrent  Gastroesophageal reflux disease, esophagitis presence not specified  Intractable headache, unspecified chronicity pattern, unspecified headache type  Tremor of right hand  Tachycardia  Spinal stenosis    No significant past surgical history      Home Medications:  famotidine 40 mg oral tablet: 1 tab(s) orally once a day (at bedtime) (10 Dec 2024 09:54)  methocarbamol 500 mg oral tablet: 1 tab(s) orally every 8 hours As needed Muscle Spasm (30 Dec 2024 16:08)  metoprolol tartrate 25 mg oral tablet: 1 tab(s) orally 3 times a day (10 Dec 2024 09:54)  naproxen 500 mg oral tablet: 1 tab(s) orally every 12 hours As needed pain (10 Dec 2024 09:54)  Norvasc 5 mg oral tablet: 1 tab(s) orally once a day (07 Nov 2024 22:03)  omeprazole 40 mg oral delayed release capsule: 1 cap(s) orally once a day (04 Dec 2024 09:08)      MEDICATIONS  (STANDING):  amLODIPine   Tablet 5 milliGRAM(s) Oral daily  enoxaparin Injectable 40 milliGRAM(s) SubCutaneous once  lactated ringers. 1000 milliLiter(s) (75 mL/Hr) IV Continuous <Continuous>  metoprolol tartrate 25 milliGRAM(s) Oral three times a day  pantoprazole    Tablet 40 milliGRAM(s) Oral before breakfast  trimethoprim  160 mG/sulfamethoxazole 800 mG 1 Tablet(s) Oral every 12 hours    MEDICATIONS  (PRN):  acetaminophen     Tablet .. 650 milliGRAM(s) Oral every 6 hours PRN Mild Pain (1 - 3)  benzonatate 100 milliGRAM(s) Oral three times a day PRN Cough  guaiFENesin  milliGRAM(s) Oral every 12 hours PRN Cough  melatonin 3 milliGRAM(s) Oral at bedtime PRN Insomnia  ondansetron Injectable 4 milliGRAM(s) IV Push every 8 hours PRN Nausea and/or Vomiting      Allergies    penicillins (Hives)  Ceclor (Rash)  Fluad 0.5 ML ODETTE (Unknown)  amoxicillin (Hives)  clindamycin (Hives; Nephrotoxicity)  Clindamycin Phosphate (Unknown)    Intolerances    Influenza Virus Vaccine, H5N1, Inactivated (Other)  gabapentin (Other)  Cipro (Other)      REVIEW OF SYSTEMS  Constitutional: No fever  Eyes: No blurry vision  Neuro: No tremors  HEENT: No pain  Cardiovascular: No chest pain, palpitations  Respiratory: No SOB, no cough  GI: No nausea, vomiting, abdominal pain  : No dysuria  Skin: no rash  Psych: no depression  Endocrine: no polyuria, polydipsia  Hem/lymph: no swelling  Osteoporosis: no fractures    PHYSICAL EXAM   Vital Signs Last 24 Hrs  T(C): 36.6 (01 Jan 2025 21:15), Max: 36.7 (01 Jan 2025 19:31)  T(F): 97.9 (01 Jan 2025 21:15), Max: 98.1 (01 Jan 2025 19:31)  HR: 91 (02 Jan 2025 05:44) (91 - 100)  BP: 147/81 (02 Jan 2025 05:44) (131/83 - 147/81)  BP(mean): --  RR: 18 (02 Jan 2025 05:44) (18 - 20)  SpO2: 98% (02 Jan 2025 05:44) (98% - 100%)    Parameters below as of 02 Jan 2025 05:44  Patient On (Oxygen Delivery Method): room air          RESPIRATORY: Clear to auscultation bilaterally;   CARDIOVASCULAR: Regular rate and rhythm; No murmurs  GI: Soft, nontender, non distended, normal bowel sounds  SKIN: Dry, intact  MUSCULOSKELETAL: Moving extremities spontaneously, no peripheral edema  NEURO: sensation intact, extraocular movements intact, no tremor  PSYCH: Answering questions appropriately, normal affect, normal mood      CAPILLARY BLOOD GLUCOSE      POCT Blood Glucose.: 91 mg/dL (02 Jan 2025 08:12)  POCT Blood Glucose.: 49 mg/dL (02 Jan 2025 07:34)      A1C with Estimated Average Glucose Result: 9.5 % (12-23-24 @ 08:53)  A1C with Estimated Average Glucose Result: 10.0 % (11-08-24 @ 06:35)                            10.5   8.12  )-----------( 519      ( 01 Jan 2025 20:24 )             35.1       01-01    134[L]  |  98  |  8[L]  ----------------------------<  123[H]  4.1   |  22  |  0.6[L]    Ca    9.3      01 Jan 2025 20:24    TPro  7.3  /  Alb  4.5  /  TBili  <0.2  /  DBili  x   /  AST  48[H]  /  ALT  61[H]  /  AlkPhos  143[H]  01-01      Thyroid Stimulating Hormone, Serum: 0.66 uIU/mL (12-23-24 @ 08:53)

## 2025-01-03 LAB
ALBUMIN SERPL ELPH-MCNC: 3.9 G/DL — SIGNIFICANT CHANGE UP (ref 3.5–5.2)
ALP SERPL-CCNC: 133 U/L — HIGH (ref 30–115)
ALT FLD-CCNC: 88 U/L — HIGH (ref 0–41)
ANION GAP SERPL CALC-SCNC: 11 MMOL/L — SIGNIFICANT CHANGE UP (ref 7–14)
APPEARANCE UR: CLEAR — SIGNIFICANT CHANGE UP
AST SERPL-CCNC: 79 U/L — HIGH (ref 0–41)
BACTERIA # UR AUTO: NEGATIVE /HPF — SIGNIFICANT CHANGE UP
BASOPHILS # BLD AUTO: 0.03 K/UL — SIGNIFICANT CHANGE UP (ref 0–0.2)
BASOPHILS NFR BLD AUTO: 0.4 % — SIGNIFICANT CHANGE UP (ref 0–1)
BILIRUB SERPL-MCNC: <0.2 MG/DL — SIGNIFICANT CHANGE UP (ref 0.2–1.2)
BILIRUB UR-MCNC: NEGATIVE — SIGNIFICANT CHANGE UP
BUN SERPL-MCNC: 5 MG/DL — LOW (ref 10–20)
CALCIUM SERPL-MCNC: 8.5 MG/DL — SIGNIFICANT CHANGE UP (ref 8.4–10.4)
CAST: 0 /LPF — SIGNIFICANT CHANGE UP (ref 0–4)
CHLORIDE SERPL-SCNC: 105 MMOL/L — SIGNIFICANT CHANGE UP (ref 98–110)
CO2 SERPL-SCNC: 22 MMOL/L — SIGNIFICANT CHANGE UP (ref 17–32)
COLOR SPEC: YELLOW — SIGNIFICANT CHANGE UP
CREAT SERPL-MCNC: 0.5 MG/DL — LOW (ref 0.7–1.5)
DIFF PNL FLD: NEGATIVE — SIGNIFICANT CHANGE UP
EGFR: 122 ML/MIN/1.73M2 — SIGNIFICANT CHANGE UP
EOSINOPHIL # BLD AUTO: 0.07 K/UL — SIGNIFICANT CHANGE UP (ref 0–0.7)
EOSINOPHIL NFR BLD AUTO: 1 % — SIGNIFICANT CHANGE UP (ref 0–8)
GLUCOSE BLDC GLUCOMTR-MCNC: 137 MG/DL — HIGH (ref 70–99)
GLUCOSE BLDC GLUCOMTR-MCNC: 192 MG/DL — HIGH (ref 70–99)
GLUCOSE BLDC GLUCOMTR-MCNC: 228 MG/DL — HIGH (ref 70–99)
GLUCOSE BLDC GLUCOMTR-MCNC: 67 MG/DL — LOW (ref 70–99)
GLUCOSE SERPL-MCNC: 59 MG/DL — LOW (ref 70–99)
GLUCOSE UR QL: NEGATIVE MG/DL — SIGNIFICANT CHANGE UP
HCT VFR BLD CALC: 34.6 % — LOW (ref 37–47)
HGB BLD-MCNC: 10.1 G/DL — LOW (ref 12–16)
IMM GRANULOCYTES NFR BLD AUTO: 1.1 % — HIGH (ref 0.1–0.3)
IRON SATN MFR SERPL: 22 UG/DL — LOW (ref 35–150)
IRON SATN MFR SERPL: 7 % — LOW (ref 15–50)
KETONES UR-MCNC: NEGATIVE MG/DL — SIGNIFICANT CHANGE UP
LEUKOCYTE ESTERASE UR-ACNC: ABNORMAL
LYMPHOCYTES # BLD AUTO: 1.99 K/UL — SIGNIFICANT CHANGE UP (ref 1.2–3.4)
LYMPHOCYTES # BLD AUTO: 27.8 % — SIGNIFICANT CHANGE UP (ref 20.5–51.1)
MAGNESIUM SERPL-MCNC: 2 MG/DL — SIGNIFICANT CHANGE UP (ref 1.8–2.4)
MCHC RBC-ENTMCNC: 22.1 PG — LOW (ref 27–31)
MCHC RBC-ENTMCNC: 29.2 G/DL — LOW (ref 32–37)
MCV RBC AUTO: 75.5 FL — LOW (ref 81–99)
MONOCYTES # BLD AUTO: 0.84 K/UL — HIGH (ref 0.1–0.6)
MONOCYTES NFR BLD AUTO: 11.7 % — HIGH (ref 1.7–9.3)
NEUTROPHILS # BLD AUTO: 4.15 K/UL — SIGNIFICANT CHANGE UP (ref 1.4–6.5)
NEUTROPHILS NFR BLD AUTO: 58 % — SIGNIFICANT CHANGE UP (ref 42.2–75.2)
NITRITE UR-MCNC: NEGATIVE — SIGNIFICANT CHANGE UP
NRBC # BLD: 0 /100 WBCS — SIGNIFICANT CHANGE UP (ref 0–0)
PH UR: 7 — SIGNIFICANT CHANGE UP (ref 5–8)
PLATELET # BLD AUTO: 511 K/UL — HIGH (ref 130–400)
PMV BLD: 10.7 FL — HIGH (ref 7.4–10.4)
POTASSIUM SERPL-MCNC: 4.7 MMOL/L — SIGNIFICANT CHANGE UP (ref 3.5–5)
POTASSIUM SERPL-SCNC: 4.7 MMOL/L — SIGNIFICANT CHANGE UP (ref 3.5–5)
PROT SERPL-MCNC: 6.6 G/DL — SIGNIFICANT CHANGE UP (ref 6–8)
PROT UR-MCNC: NEGATIVE MG/DL — SIGNIFICANT CHANGE UP
RBC # BLD: 4.58 M/UL — SIGNIFICANT CHANGE UP (ref 4.2–5.4)
RBC # FLD: 17.6 % — HIGH (ref 11.5–14.5)
RBC CASTS # UR COMP ASSIST: 0 /HPF — SIGNIFICANT CHANGE UP (ref 0–4)
SODIUM SERPL-SCNC: 138 MMOL/L — SIGNIFICANT CHANGE UP (ref 135–146)
SP GR SPEC: 1.01 — SIGNIFICANT CHANGE UP (ref 1–1.03)
SQUAMOUS # UR AUTO: 9 /HPF — HIGH (ref 0–5)
TIBC SERPL-MCNC: 310 UG/DL — SIGNIFICANT CHANGE UP (ref 220–430)
UIBC SERPL-MCNC: 288 UG/DL — SIGNIFICANT CHANGE UP (ref 110–370)
UROBILINOGEN FLD QL: 0.2 MG/DL — SIGNIFICANT CHANGE UP (ref 0.2–1)
WBC # BLD: 7.16 K/UL — SIGNIFICANT CHANGE UP (ref 4.8–10.8)
WBC # FLD AUTO: 7.16 K/UL — SIGNIFICANT CHANGE UP (ref 4.8–10.8)
WBC UR QL: 6 /HPF — HIGH (ref 0–5)

## 2025-01-03 PROCEDURE — 99232 SBSQ HOSP IP/OBS MODERATE 35: CPT

## 2025-01-03 RX ORDER — DEXTROSE MONOHYDRATE 25 G/50ML
25 INJECTION, SOLUTION INTRAVENOUS ONCE
Refills: 0 | Status: DISCONTINUED | OUTPATIENT
Start: 2025-01-03 | End: 2025-01-06

## 2025-01-03 RX ORDER — GLUCAGON INJECTION, SOLUTION 0.5 MG/.1ML
1 INJECTION, SOLUTION SUBCUTANEOUS ONCE
Refills: 0 | Status: DISCONTINUED | OUTPATIENT
Start: 2025-01-03 | End: 2025-01-06

## 2025-01-03 RX ORDER — DEXTROSE MONOHYDRATE 25 G/50ML
12.5 INJECTION, SOLUTION INTRAVENOUS ONCE
Refills: 0 | Status: DISCONTINUED | OUTPATIENT
Start: 2025-01-03 | End: 2025-01-06

## 2025-01-03 RX ORDER — SODIUM CHLORIDE 9 MG/ML
1000 INJECTION, SOLUTION INTRAVENOUS
Refills: 0 | Status: DISCONTINUED | OUTPATIENT
Start: 2025-01-03 | End: 2025-01-06

## 2025-01-03 RX ORDER — DEXTROSE MONOHYDRATE 25 G/50ML
15 INJECTION, SOLUTION INTRAVENOUS ONCE
Refills: 0 | Status: DISCONTINUED | OUTPATIENT
Start: 2025-01-03 | End: 2025-01-06

## 2025-01-03 RX ORDER — FERROUS SULFATE 325(65) MG
325 TABLET ORAL
Refills: 0 | Status: DISCONTINUED | OUTPATIENT
Start: 2025-01-03 | End: 2025-01-06

## 2025-01-03 RX ADMIN — Medication 25 MILLIGRAM(S): at 06:26

## 2025-01-03 RX ADMIN — Medication 5 MILLIGRAM(S): at 06:26

## 2025-01-03 RX ADMIN — Medication 25 MILLIGRAM(S): at 21:34

## 2025-01-03 RX ADMIN — Medication 325 MILLIGRAM(S): at 17:51

## 2025-01-03 RX ADMIN — PANTOPRAZOLE 40 MILLIGRAM(S): 40 TABLET, DELAYED RELEASE ORAL at 06:27

## 2025-01-03 RX ADMIN — Medication 25 MILLIGRAM(S): at 14:19

## 2025-01-03 RX ADMIN — Medication 1 TABLET(S): at 06:26

## 2025-01-03 RX ADMIN — SERTRALINE HYDROCHLORIDE 100 MILLIGRAM(S): 25 TABLET ORAL at 12:13

## 2025-01-03 NOTE — PROGRESS NOTE ADULT - ASSESSMENT
39 year old female with PMH of DM1 (insulin pump),depression, anxiety ,asthma, GERD, HTN and obesity who presents to the hospital for a one week history of nausea, vomiting and diarrhea    #SSRI adverse reaction vs URTI   -NVD onset coincides with both her influenza ddx and medication increase  -Behavioral health consult for SSRI adjustment   -Outside the window for tamiflu  -c/w supportive care  -gentle hydration with LR @75CC   -Zofran for antiemetics monitor QTC.  on 1/02    #DM1  -patient has their own insulin pump  -mother will bring refills in AM  - pump settings adjusted by endo    #HTN  -c/w amlodipine   -c/w metoprolol (patient says she was prescribed it by Dr. Hopper for sinus tachycardia)  -will get EKG to confirm no underlying arrythmia     #UTI?  -Patient reports having dysuria during recent admission  -U/A from hospitalization dirty  -will resend U/A with culture    MISC  DVT: Lovenox  GI: Pantoprazole 40 qd  Diet: Carb Consistent   Activity: IAT 39 year old female with PMH of DM1 (insulin pump),depression, anxiety ,asthma, GERD, HTN and obesity who presents to the hospital for a one week history of nausea, vomiting and diarrhea    #SSRI adverse reaction vs URTI   -NVD onset coincides with both her influenza ddx and medication increase  -Behavioral health consult for SSRI adjustment   -Outside the window for tamiflu  -c/w supportive care  -gentle hydration with LR @75CC   -Zofran for antiemetics monitor QTC.  on 1/02    #DM1  -patient has their own insulin pump  -mother will bring refills in AM  - pump settings adjusted by endo    #Microcytic anemia  - mcv 75.5  - total iron 22, TIBC 310, percent sat 7  - supplement with iron    #HTN  -c/w amlodipine   -c/w metoprolol (patient says she was prescribed it by Dr. Hopper for sinus tachycardia)  -will get EKG to confirm no underlying arrythmia     #UTI?  -Patient reports having dysuria during recent admission  -U/A from hospitalization dirty  -will resend U/A with culture    MISC  DVT: Lovenox  GI: Pantoprazole 40 qd  Diet: Carb Consistent   Activity: IAT

## 2025-01-03 NOTE — PROGRESS NOTE ADULT - ATTENDING COMMENTS
Patient is a 39 year old female with PMH of DM1 (insulin pump),depression, anxiety ,asthma, GERD, HTN and obesity who presents to the hospital for a one week history of nausea, vomiting, recently tx. for URI.    #Nausea/Vomiting- resolved  #Recently tx. Flu A  suspect due to viral gastroenteritis  Dx with flu 12/23 when pt was admitted under psych. She was discharged on 12/31, but could not tolerate any food/meds at home  - advanced diet as tolerated       #DM1  -patient has insulin pump  - Endo on board for pump management    #HTN  -c/w amlodipine   -c/w metoprolol (patient says she was prescribed it by Dr. Hopper for sinus tachycardia)     # h/o UTI- repeated u/a neg, d/c abx.     # Iron def. anemia - start on daily iron tx.       DVT, Lovenox    #Progress Note Handoff: start d/c home planning if no further N/V. monitor bsfs, pt. adjusting insulin dose with ins. pump.   Family discussion: current medical plan of tx. d/w pt. by bedside Disposition: Home__ tomorrow    Total time spent to complete patient's bedside assessment, review medical chart, discuss medical plan of care with covering medical team was more than 35 minutes with >50% of time spent face to face with patient, discussion with patient/family and/or coordination of care

## 2025-01-04 ENCOUNTER — TRANSCRIPTION ENCOUNTER (OUTPATIENT)
Age: 40
End: 2025-01-04

## 2025-01-04 LAB
CULTURE RESULTS: SIGNIFICANT CHANGE UP
FERRITIN SERPL-MCNC: 18 NG/ML — SIGNIFICANT CHANGE UP (ref 15–150)
GLUCOSE BLDC GLUCOMTR-MCNC: 102 MG/DL — HIGH (ref 70–99)
GLUCOSE BLDC GLUCOMTR-MCNC: 148 MG/DL — HIGH (ref 70–99)
GLUCOSE BLDC GLUCOMTR-MCNC: 210 MG/DL — HIGH (ref 70–99)
GLUCOSE BLDC GLUCOMTR-MCNC: 246 MG/DL — HIGH (ref 70–99)
GLUCOSE BLDC GLUCOMTR-MCNC: 61 MG/DL — LOW (ref 70–99)
GLUCOSE BLDC GLUCOMTR-MCNC: 70 MG/DL — SIGNIFICANT CHANGE UP (ref 70–99)
SPECIMEN SOURCE: SIGNIFICANT CHANGE UP

## 2025-01-04 PROCEDURE — 99232 SBSQ HOSP IP/OBS MODERATE 35: CPT

## 2025-01-04 RX ORDER — FERROUS SULFATE 325(65) MG
1 TABLET ORAL
Qty: 30 | Refills: 2
Start: 2025-01-04 | End: 2025-04-03

## 2025-01-04 RX ADMIN — Medication 5 MILLIGRAM(S): at 05:27

## 2025-01-04 RX ADMIN — Medication 25 MILLIGRAM(S): at 13:53

## 2025-01-04 RX ADMIN — Medication 25 MILLIGRAM(S): at 21:10

## 2025-01-04 RX ADMIN — Medication 325 MILLIGRAM(S): at 05:27

## 2025-01-04 RX ADMIN — SERTRALINE HYDROCHLORIDE 100 MILLIGRAM(S): 25 TABLET ORAL at 11:43

## 2025-01-04 RX ADMIN — Medication 25 MILLIGRAM(S): at 05:27

## 2025-01-04 RX ADMIN — Medication 325 MILLIGRAM(S): at 17:41

## 2025-01-04 RX ADMIN — PANTOPRAZOLE 40 MILLIGRAM(S): 40 TABLET, DELAYED RELEASE ORAL at 05:27

## 2025-01-04 NOTE — DISCHARGE NOTE PROVIDER - HOSPITAL COURSE
39 year old female with PMH of DM1 (insulin pump),depression, anxiety ,asthma, GERD, HTN and obesity who presents to the hospital for a one week history of nausea, vomiting and diarrhea. Patient was recently admitted to [facility] hospital for suicidal ideation (12/21-12/29). During that hospitalization patient started to experience loose stools and decreased appetite and was found to have influenza. Of note patient is on zoloft and had her dose increased from 50mg daily to 75mg and eventually 100mg daily which also coincides with the onset of her symptoms. Patient denies any fever, chills, shortness of breath or abdominal pain. Patient does endorse cough    ED Course   Vitals: Tmax:97.9, , /71  Interventions; Zofran 4mgx2 and LR 2L Bolus       #Nausea/Vomiting- resolved  #Recently tx. Flu A  suspect due to viral gastroenteritis  Dx with flu 12/23 when pt was admitted under psych. She was discharged on 12/31, but could not tolerate any food/meds at home  - advanced diet as tolerated       #DM1  -patient has insulin pump  - Endo on board for pump management    #HTN  -c/w amlodipine   -c/w metoprolol (patient says she was prescribed it by Dr. Hopper for sinus tachycardia)     # h/o UTI- repeated u/a neg, d/c abx.     # Iron def. anemia - start on daily iron tx.       Admission course  - Pt's symptoms resolved. She is able to tolerate oral diet. Her insulin pump basal doses are adjusted per Endocrine recommendations. Pt is educated on bolus dosing. Pt is discharged in a stable condition with recommendations and follow up plan.     Final Insulin recs:   - basal rates changed as follow               1 - 12am to 12pm 2.45U/hr             2 - 12pm to 4pm 3.2U/hr              3 - 4pm to 12am 3.45U/hr                - c/w Bolus doses during meals and snacks , discussed if not having carbs no boluses and also waiting to make sure she tolerates food and possible bolusing post meals                  39 year old female with PMH of DM1 (insulin pump),depression, anxiety ,asthma, GERD, HTN and obesity who presents to the hospital for a one week history of nausea, vomiting and diarrhea. Patient was recently admitted to [facility] hospital for suicidal ideation (12/21-12/29). During that hospitalization patient started to experience loose stools and decreased appetite and was found to have influenza. Of note patient is on zoloft and had her dose increased from 50mg daily to 75mg and eventually 100mg daily which also coincides with the onset of her symptoms. Patient denies any fever, chills, shortness of breath or abdominal pain. Patient does endorse cough    ED Course   Vitals: Tmax:97.9, , /71  Interventions; Zofran 4mgx2 and LR 2L Bolus       #Nausea/Vomiting- resolved  #Recently tx. Flu A  suspect due to viral gastroenteritis  Dx with flu 12/23 when pt was admitted under psych. She was discharged on 12/31, but could not tolerate any food/meds at home  - advanced diet as tolerated , patient was started on iron supplement due to iron deficiency and c/o mild abdominal cramping and distention, prescribed Simethicone prn. tx.       #DM1  -patient has insulin pump  - Endo on board for pump management    #HTN  -c/w amlodipine   -c/w metoprolol (patient says she was prescribed it by Dr. Hopper for sinus tachycardia)     # h/o UTI- repeated u/a neg, d/c abx.     # Iron def. anemia - started on daily iron tx.       Admission course  - Pt's symptoms resolved. She is able to tolerate oral diet. Her insulin pump basal doses are adjusted per Endocrine recommendations. Pt is educated on bolus dosing. Pt is discharged in a stable condition with recommendations and follow up plan.     Final Insulin recs:   - basal rates changed as follow               1 - 12am to 12pm 2.45U/hr             2 - 12pm to 4pm 3.2U/hr              3 - 4pm to 12am 3.45U/hr                - c/w Bolus doses during meals and snacks , discussed if not having carbs no boluses and also waiting to make sure she tolerates food and possible bolusing post meals

## 2025-01-04 NOTE — DISCHARGE NOTE PROVIDER - DISCHARGE DATE
Airway    Performed by: Mario Rosas CRNA  Authorized by: Mario Rosas CRNA    Final Airway Type:  Endotracheal airway  Final Endotracheal Airway*:  ETT  ETT Size (mm)*:  7.0  Cuff*:  Regular (MOP)  Technique Used for Successful ETT Placement:  Direct laryngoscopy  Devices/Methods Used in Placement*:  Mask and Oral ETT  Intubation Procedure*:  Preoxygenation, ETCO2, Atraumatic, Dentition Unchanged and Pharynx Clear  Insertion Site:  Oral  Blade Type*:  Video Laryngoscope (Grade 3 view with Mac 3 blade. Unable to visualize any part of glottis.)  Blade Size*:  3  Cuff Volume (mL):  5  Measured from*:  Lips  Secured at (cm)*:  20  Placement Verified by: auscultation and capnometry    Glottic View*:  1 - full view of glottis  Attempts*:  1   Patient Identified, Procedure confirmed, Emergency equipment available and Safety protocols followed  Location:  OR  Urgency:  Elective  Difficult Airway: No    Indications for Airway Management:  Anesthesia  Spontaneous Ventilation: absent    Sedation Level:  Anesthetized  Mask Difficulty Assessment:  1 - vent by mask  Start Time: 11/25/2024 8:56 AM       04-Jan-2025 06-Jan-2025

## 2025-01-04 NOTE — DISCHARGE NOTE PROVIDER - NSDCCPCAREPLAN_GEN_ALL_CORE_FT
PRINCIPAL DISCHARGE DIAGNOSIS  Diagnosis: Flu syndrome  Assessment and Plan of Treatment: You         SECONDARY DISCHARGE DIAGNOSES  Diagnosis: Nausea & vomiting  Assessment and Plan of Treatment: Discharge Instructions:  Continue monitoring blood glucose frequently (every 2-4 hours). Flu-like illnesses can significantly impact blood sugar levels.  Check for ketones in urine or blood when blood glucose is consistently above 240 mg/dL.  Do not discontinue insulin during illness. Adjust basal and bolus doses as needed based on blood glucose levels and guidance from your endocrinologist/diabetes educator.  Maintain hydration. Sip sugar-free fluids frequently to prevent dehydration. If vomiting, try small sips of clear liquids like broth or electrolyte solutions.  Contact your healthcare provider if vomiting persists for more than 4 hours, blood glucose remains persistently high or low despite adjustments, or if you experience severe weakness or confusion.   Hypoglycemia Management:  Recognize symptoms of hypoglycemia: shakiness, sweating, dizziness, confusion, irritability, hunger, headache.  Treat hypoglycemia immediately: Consume 15-20 grams of fast-acting carbohydrates (e.g., 4 glucose tablets, 4 ounces of juice, regular soda). Recheck blood glucose in 15 minutes and repeat treatment if necessary. Follow with a small snack containing protein and carbohydrates if the next meal is more than an hour away.  Carry a glucagon emergency kit and ensure family/friends know how to administer it.  Insulin Pump Management:  Resume your usual insulin pump settings as directed by your healthcare provider.  Ensure proper pump function: Check for any alarms, kinks in the tubing, and adequate insulin supply.  Follow up with your endocrinologist/diabetes educator as scheduled to review pump settings and overall diabetes management.  When to Seek Immediate Medical Attention:  Loss of consciousness  Severe or persistent vomiting  Difficulty breathing  High fever  Severe abdominal pain  Inability to keep down fluids     PRINCIPAL DISCHARGE DIAGNOSIS  Diagnosis: Flu syndrome  Assessment and Plan of Treatment: You         SECONDARY DISCHARGE DIAGNOSES  Diagnosis: Nausea & vomiting  Assessment and Plan of Treatment: Discharge Instructions:  Continue monitoring blood glucose frequently (every 2-4 hours). Flu-like illnesses can significantly impact blood sugar levels.  Check for ketones in urine or blood when blood glucose is consistently above 240 mg/dL.  Do not discontinue insulin during illness. Adjust basal and bolus doses as needed based on blood glucose levels and guidance from your endocrinologist/diabetes educator.  Maintain hydration. Sip sugar-free fluids frequently to prevent dehydration. If vomiting, try small sips of clear liquids like broth or electrolyte solutions.  Contact your healthcare provider if vomiting persists for more than 4 hours, blood glucose remains persistently high or low despite adjustments, or if you experience severe weakness or confusion.   Hypoglycemia Management:  Recognize symptoms of hypoglycemia: shakiness, sweating, dizziness, confusion, irritability, hunger, headache.  Treat hypoglycemia immediately: Consume 15-20 grams of fast-acting carbohydrates (e.g., 4 glucose tablets, 4 ounces of juice, regular soda). Recheck blood glucose in 15 minutes and repeat treatment if necessary. Follow with a small snack containing protein and carbohydrates if the next meal is more than an hour away.  Carry a glucagon emergency kit and ensure family/friends know how to administer it.  When to Seek Immediate Medical Attention:  Loss of consciousness  Severe or persistent vomiting  Difficulty breathing  High fever  Severe abdominal pain  Inability to keep down fluids     PRINCIPAL DISCHARGE DIAGNOSIS  Diagnosis: Flu syndrome  Assessment and Plan of Treatment: You have been evaluated for mild Nausea and Vomiting, most likely due to viral gastroenteritis, all symptoms improved during inpatient stay, your stool was checked for acute bacterial infection and it was negative. You have iron deficiency anemia for which you will need to take daily iron supplement, which can have side effects of abdominal cramping, constipation, flatulence. If you unable to tolerate iron supplement therapy, please follow up with your Primary care physician for further outpatient management and therapy of iron deficiency anemia.   Take medications as prescribed. If you develop Nausea, Vomiting, Diarrhea, Fever please seek medical attention immediately.         SECONDARY DISCHARGE DIAGNOSES  Diagnosis: Nausea & vomiting  Assessment and Plan of Treatment: Discharge Instructions:  Continue monitoring blood glucose frequently (every 2-4 hours). Flu-like illnesses can significantly impact blood sugar levels.  Check for ketones in urine or blood when blood glucose is consistently above 240 mg/dL.  Do not discontinue insulin during illness. Adjust basal and bolus doses as needed based on blood glucose levels and guidance from your endocrinologist/diabetes educator.  Maintain hydration. Sip sugar-free fluids frequently to prevent dehydration. If vomiting, try small sips of clear liquids like broth or electrolyte solutions.  Contact your healthcare provider if vomiting persists for more than 4 hours, blood glucose remains persistently high or low despite adjustments, or if you experience severe weakness or confusion.   Hypoglycemia Management:  Recognize symptoms of hypoglycemia: shakiness, sweating, dizziness, confusion, irritability, hunger, headache.  Treat hypoglycemia immediately: Consume 15-20 grams of fast-acting carbohydrates (e.g., 4 glucose tablets, 4 ounces of juice, regular soda). Recheck blood glucose in 15 minutes and repeat treatment if necessary. Follow with a small snack containing protein and carbohydrates if the next meal is more than an hour away.  Carry a glucagon emergency kit and ensure family/friends know how to administer it.  When to Seek Immediate Medical Attention:  Loss of consciousness  Severe or persistent vomiting  Difficulty breathing  High fever  Severe abdominal pain  Inability to keep down fluids

## 2025-01-04 NOTE — DISCHARGE NOTE PROVIDER - NSDCMRMEDTOKEN_GEN_ALL_CORE_FT
benzonatate 100 mg oral capsule: 1 cap(s) orally 3 times a day x 5 days as needed for Cough  famotidine 40 mg oral tablet: 1 tab(s) orally once a day (at bedtime)  ferrous sulfate 325 mg (65 mg elemental iron) oral tablet: 1 tab(s) orally once a day  insulin regular: As directed use for continous insulin pump  methocarbamol 500 mg oral tablet: 1 tab(s) orally every 8 hours As needed Muscle Spasm  metoprolol tartrate 25 mg oral tablet: 1 tab(s) orally 3 times a day  naproxen 500 mg oral tablet: 1 tab(s) orally every 12 hours As needed pain  Norvasc 5 mg oral tablet: 1 tab(s) orally once a day  omeprazole 40 mg oral delayed release capsule: 1 cap(s) orally once a day  sertraline 100 mg oral tablet: 1 tab(s) orally once a day x 14 days Continue to take as prescribed until told otherwise by your provider   ferrous sulfate 325 mg (65 mg elemental iron) oral tablet: 1 tab(s) orally once a day  insulin regular: 2.45 unit(s) subcutaneous every hour As directed use for continous insulin pump  lactobacillus acidophilus oral capsule: 1 tab(s) orally 2 times a day  methocarbamol 500 mg oral tablet: 1 tab(s) orally every 8 hours As needed Muscle Spasm  metoprolol tartrate 25 mg oral tablet: 1 tab(s) orally 3 times a day  naproxen 500 mg oral tablet: 1 tab(s) orally every 12 hours As needed pain  Norvasc 5 mg oral tablet: 1 tab(s) orally once a day  omeprazole 40 mg oral delayed release capsule: 1 cap(s) orally once a day  ondansetron 4 mg oral tablet: 1 tab(s) orally every 8 hours as needed for  nausea  sertraline 100 mg oral tablet: 1 tab(s) orally once a day x 14 days Continue to take as prescribed until told otherwise by your provider  simethicone 80 mg oral tablet, chewable: 1 tab(s) orally every 6 hours as needed for Gas

## 2025-01-04 NOTE — DISCHARGE NOTE PROVIDER - NSDCFUADDINST_GEN_ALL_CORE_FT
Please follow up with your Family Doctor in next 7 days after discharge home   Continue close blood sugar level monitoring with insulin pump therapy adjustment, outpatient Endocrinology specialist follow up

## 2025-01-04 NOTE — DISCHARGE NOTE PROVIDER - PROVIDER TOKENS
PROVIDER:[TOKEN:[[id number]],FOLLOWUP:[2 weeks]] PROVIDER:[TOKEN:[[id number]],FOLLOWUP:[1 week]]

## 2025-01-04 NOTE — DISCHARGE NOTE PROVIDER - CARE PROVIDER_API CALL
Elizabeth Galvez  Endocrinology/Metab/Diabetes  [address] [place] [address], [address]  [place] [address]  Phone: [phone]  Fax: [phone]  Follow Up Time: 2 weeks   Elizabeth Galvez  Endocrinology/Metab/Diabetes  [address] [place] [address], [address]  [place] [address]  Phone: [phone]  Fax: [phone]  Follow Up Time: 1 week

## 2025-01-05 LAB
GI PCR PANEL: SIGNIFICANT CHANGE UP
GLUCOSE BLDC GLUCOMTR-MCNC: 116 MG/DL — HIGH (ref 70–99)
GLUCOSE BLDC GLUCOMTR-MCNC: 130 MG/DL — HIGH (ref 70–99)
GLUCOSE BLDC GLUCOMTR-MCNC: 136 MG/DL — HIGH (ref 70–99)
GLUCOSE BLDC GLUCOMTR-MCNC: 98 MG/DL — SIGNIFICANT CHANGE UP (ref 70–99)

## 2025-01-05 PROCEDURE — 99232 SBSQ HOSP IP/OBS MODERATE 35: CPT

## 2025-01-05 RX ORDER — LACTOBACILLUS ACIDOPHILUS/PECT 75 MM-100
1 CAPSULE ORAL
Refills: 0 | Status: DISCONTINUED | OUTPATIENT
Start: 2025-01-05 | End: 2025-01-06

## 2025-01-05 RX ADMIN — SERTRALINE HYDROCHLORIDE 100 MILLIGRAM(S): 25 TABLET ORAL at 11:25

## 2025-01-05 RX ADMIN — Medication 5 MILLIGRAM(S): at 05:24

## 2025-01-05 RX ADMIN — Medication 1 TABLET(S): at 17:55

## 2025-01-05 RX ADMIN — PANTOPRAZOLE 40 MILLIGRAM(S): 40 TABLET, DELAYED RELEASE ORAL at 05:23

## 2025-01-05 RX ADMIN — Medication 25 MILLIGRAM(S): at 21:38

## 2025-01-05 RX ADMIN — Medication 1 TABLET(S): at 16:14

## 2025-01-05 RX ADMIN — Medication 325 MILLIGRAM(S): at 05:24

## 2025-01-05 RX ADMIN — Medication 325 MILLIGRAM(S): at 17:55

## 2025-01-05 RX ADMIN — Medication 25 MILLIGRAM(S): at 05:24

## 2025-01-05 RX ADMIN — Medication 25 MILLIGRAM(S): at 13:34

## 2025-01-06 ENCOUNTER — TRANSCRIPTION ENCOUNTER (OUTPATIENT)
Age: 40
End: 2025-01-06

## 2025-01-06 ENCOUNTER — APPOINTMENT (OUTPATIENT)
Dept: GASTROENTEROLOGY | Facility: CLINIC | Age: 40
End: 2025-01-06

## 2025-01-06 VITALS — DIASTOLIC BLOOD PRESSURE: 77 MMHG | HEART RATE: 108 BPM | SYSTOLIC BLOOD PRESSURE: 114 MMHG

## 2025-01-06 DIAGNOSIS — F33.2 MAJOR DEPRESSIVE DISORDER, RECURRENT SEVERE WITHOUT PSYCHOTIC FEATURES: ICD-10-CM

## 2025-01-06 DIAGNOSIS — Z88.7 ALLERGY STATUS TO SERUM AND VACCINE: ICD-10-CM

## 2025-01-06 DIAGNOSIS — Z88.0 ALLERGY STATUS TO PENICILLIN: ICD-10-CM

## 2025-01-06 DIAGNOSIS — E10.9 TYPE 1 DIABETES MELLITUS WITHOUT COMPLICATIONS: ICD-10-CM

## 2025-01-06 DIAGNOSIS — I10 ESSENTIAL (PRIMARY) HYPERTENSION: ICD-10-CM

## 2025-01-06 DIAGNOSIS — K21.9 GASTRO-ESOPHAGEAL REFLUX DISEASE WITHOUT ESOPHAGITIS: ICD-10-CM

## 2025-01-06 DIAGNOSIS — Z63.4 DISAPPEARANCE AND DEATH OF FAMILY MEMBER: ICD-10-CM

## 2025-01-06 DIAGNOSIS — Z11.52 ENCOUNTER FOR SCREENING FOR COVID-19: ICD-10-CM

## 2025-01-06 DIAGNOSIS — Z88.1 ALLERGY STATUS TO OTHER ANTIBIOTIC AGENTS: ICD-10-CM

## 2025-01-06 DIAGNOSIS — F60.3 BORDERLINE PERSONALITY DISORDER: ICD-10-CM

## 2025-01-06 DIAGNOSIS — F41.9 ANXIETY DISORDER, UNSPECIFIED: ICD-10-CM

## 2025-01-06 DIAGNOSIS — Z79.4 LONG TERM (CURRENT) USE OF INSULIN: ICD-10-CM

## 2025-01-06 DIAGNOSIS — Z96.41 PRESENCE OF INSULIN PUMP (EXTERNAL) (INTERNAL): ICD-10-CM

## 2025-01-06 LAB
ANION GAP SERPL CALC-SCNC: 12 MMOL/L — SIGNIFICANT CHANGE UP (ref 7–14)
BUN SERPL-MCNC: 8 MG/DL — LOW (ref 10–20)
CALCIUM SERPL-MCNC: 9.5 MG/DL — SIGNIFICANT CHANGE UP (ref 8.4–10.5)
CHLORIDE SERPL-SCNC: 105 MMOL/L — SIGNIFICANT CHANGE UP (ref 98–110)
CO2 SERPL-SCNC: 24 MMOL/L — SIGNIFICANT CHANGE UP (ref 17–32)
CREAT SERPL-MCNC: 0.6 MG/DL — LOW (ref 0.7–1.5)
EGFR: 117 ML/MIN/1.73M2 — SIGNIFICANT CHANGE UP
GLUCOSE BLDC GLUCOMTR-MCNC: 92 MG/DL — SIGNIFICANT CHANGE UP (ref 70–99)
GLUCOSE BLDC GLUCOMTR-MCNC: 96 MG/DL — SIGNIFICANT CHANGE UP (ref 70–99)
GLUCOSE SERPL-MCNC: 100 MG/DL — HIGH (ref 70–99)
HCT VFR BLD CALC: 35 % — LOW (ref 37–47)
HGB BLD-MCNC: 10.2 G/DL — LOW (ref 12–16)
MCHC RBC-ENTMCNC: 22.2 PG — LOW (ref 27–31)
MCHC RBC-ENTMCNC: 29.1 G/DL — LOW (ref 32–37)
MCV RBC AUTO: 76.3 FL — LOW (ref 81–99)
NRBC # BLD: 0 /100 WBCS — SIGNIFICANT CHANGE UP (ref 0–0)
PLATELET # BLD AUTO: 574 K/UL — HIGH (ref 130–400)
PMV BLD: 10.6 FL — HIGH (ref 7.4–10.4)
POTASSIUM SERPL-MCNC: 4.8 MMOL/L — SIGNIFICANT CHANGE UP (ref 3.5–5)
POTASSIUM SERPL-SCNC: 4.8 MMOL/L — SIGNIFICANT CHANGE UP (ref 3.5–5)
RBC # BLD: 4.59 M/UL — SIGNIFICANT CHANGE UP (ref 4.2–5.4)
RBC # FLD: 18 % — HIGH (ref 11.5–14.5)
SODIUM SERPL-SCNC: 141 MMOL/L — SIGNIFICANT CHANGE UP (ref 135–146)
WBC # BLD: 7.59 K/UL — SIGNIFICANT CHANGE UP (ref 4.8–10.8)
WBC # FLD AUTO: 7.59 K/UL — SIGNIFICANT CHANGE UP (ref 4.8–10.8)

## 2025-01-06 PROCEDURE — 99239 HOSP IP/OBS DSCHRG MGMT >30: CPT

## 2025-01-06 RX ORDER — LACTOBACILLUS ACIDOPHILUS/PECT 75 MM-100
1 CAPSULE ORAL
Qty: 14 | Refills: 0
Start: 2025-01-06 | End: 2025-01-12

## 2025-01-06 RX ORDER — ONDANSETRON 4 MG/1
4 TABLET ORAL ONCE
Refills: 0 | Status: COMPLETED | OUTPATIENT
Start: 2025-01-06 | End: 2025-01-06

## 2025-01-06 RX ORDER — SIMETHICONE 125 MG/1
80 CAPSULE, LIQUID FILLED ORAL EVERY 6 HOURS
Refills: 0 | Status: DISCONTINUED | OUTPATIENT
Start: 2025-01-06 | End: 2025-01-06

## 2025-01-06 RX ORDER — SIMETHICONE 125 MG/1
1 CAPSULE, LIQUID FILLED ORAL
Qty: 28 | Refills: 0
Start: 2025-01-06 | End: 2025-01-12

## 2025-01-06 RX ORDER — CHLORHEXIDINE GLUCONATE 1.2 MG/ML
1 RINSE ORAL
Refills: 0 | Status: DISCONTINUED | OUTPATIENT
Start: 2025-01-06 | End: 2025-01-06

## 2025-01-06 RX ORDER — ONDANSETRON 4 MG/1
1 TABLET ORAL
Qty: 9 | Refills: 0
Start: 2025-01-06 | End: 2025-01-08

## 2025-01-06 RX ADMIN — PANTOPRAZOLE 40 MILLIGRAM(S): 40 TABLET, DELAYED RELEASE ORAL at 06:29

## 2025-01-06 RX ADMIN — CHLORHEXIDINE GLUCONATE 1 APPLICATION(S): 1.2 RINSE ORAL at 14:55

## 2025-01-06 RX ADMIN — ONDANSETRON 4 MILLIGRAM(S): 4 TABLET ORAL at 12:03

## 2025-01-06 RX ADMIN — Medication 1 TABLET(S): at 09:01

## 2025-01-06 RX ADMIN — Medication 25 MILLIGRAM(S): at 14:54

## 2025-01-06 RX ADMIN — Medication 325 MILLIGRAM(S): at 06:30

## 2025-01-06 RX ADMIN — Medication 25 MILLIGRAM(S): at 06:29

## 2025-01-06 RX ADMIN — SERTRALINE HYDROCHLORIDE 100 MILLIGRAM(S): 25 TABLET ORAL at 12:03

## 2025-01-06 RX ADMIN — Medication 5 MILLIGRAM(S): at 06:29

## 2025-01-06 SDOH — SOCIAL STABILITY - SOCIAL INSECURITY: DISSAPEARANCE AND DEATH OF FAMILY MEMBER: Z63.4

## 2025-01-06 NOTE — DISCHARGE NOTE NURSING/CASE MANAGEMENT/SOCIAL WORK - PATIENT PORTAL LINK FT
You can access the FollowMyHealth Patient Portal offered by [facility] by registering at the following website: [email or web address]. By joining [facility]’s FollowMyHealth portal, you will also be able to view your health information using other applications (apps) compatible with our system.

## 2025-01-06 NOTE — BH SOCIAL WORK CONFIRMATION FOLLOW UP NOTE - NSLINKEDTOLOC_PSY_ALL_CORE
[facility] IOP, [address] [id number], [phone] (intake with Darrell).

## 2025-01-06 NOTE — DISCHARGE NOTE NURSING/CASE MANAGEMENT/SOCIAL WORK - FINANCIAL ASSISTANCE
[facility] provides services at a reduced cost to those who are determined to be eligible through [facility]’s financial assistance program. Information regarding [facility]’s financial assistance program can be found by going to [email or web address] or by calling [phone].

## 2025-01-06 NOTE — DISCHARGE NOTE NURSING/CASE MANAGEMENT/SOCIAL WORK - NSDCPEFALRISK_GEN_ALL_CORE
For information on Fall & Injury Prevention, visit: [email or web address] OR  [email or web address] OR  https://www.cdc.gov/steadi/patient.html

## 2025-01-06 NOTE — DISCHARGE NOTE NURSING/CASE MANAGEMENT/SOCIAL WORK - FLU SEASON?
Patient called the office wondering why he cannot have his glipizide refilled.  I do see that on 6- there was a request for metformin and glipizide. The metformin was refilled but the glipizide was denied. I am not sure why the glipizide was denied.  His nursing staff will discuss this with Dr Smith when he is back in the office 6-.  Message routed to [facility].  
Prescription sent and problem list updated  
I have personally seen and examined this patient.  I have fully participated in the care of this patient. I have reviewed all pertinent clinical information, including history, physical exam, plan and the Resident’s note and agree except as noted.
Yes...

## 2025-01-06 NOTE — PROGRESS NOTE ADULT - SUBJECTIVE AND OBJECTIVE BOX
CRISTI MONTGOMERY  [facility] 3A 025 A ([facility])      Patient was evaluated and examined  by bedside, c/o 3 loose bm's in am , abdominal cramping, no nausea, no vomiting, no fever, no BM's yesterday       REVIEW OF SYSTEMS:  please see pertinent positives mentioned above, all other 12 ROS negative      T(C): , Max: 36.5 (01-04-25 @ 05:20)  HR: 99 (01-04-25 @ 05:20)  BP: 118/69 (01-04-25 @ 05:20)  RR: 18 (01-04-25 @ 05:20)  SpO2: --  CAPILLARY BLOOD GLUCOSE      POCT Blood Glucose.: 246 mg/dL (04 Jan 2025 12:07)  POCT Blood Glucose.: 70 mg/dL (04 Jan 2025 07:51)  POCT Blood Glucose.: 102 mg/dL (04 Jan 2025 05:34)  POCT Blood Glucose.: 61 mg/dL (04 Jan 2025 01:48)  POCT Blood Glucose.: 192 mg/dL (03 Jan 2025 20:45)  POCT Blood Glucose.: 137 mg/dL (03 Jan 2025 18:54)      PHYSICAL EXAM:  General: NAD, AAOX3, patient is laying comfortably in bed  HEENT: AT, NC, Supple, NO JVD, NO CB  Lungs: CTA B/L, no wheezing, no rhonchi  CVS: normal S1, S2, RRR, NO M/G/R  Abdomen: soft, bowel sounds present, non-tender, non-distended  Extremities: no edema, no clubbing, no cyanosis, positive peripheral pulses b/l  Neuro: no acute focal neurological deficits  Skin: no rash, no ecchymosis      LAB  CBC  Date: 01-03-25 @ 07:01  Mean cell Klvepcotum61.1  Mean cell Hemoglobin Conc29.2  Mean cell Volum 75.5  Platelet count-Automate 511  RBC Count 4.58  Red Cell Distrib Width17.6  WBC Count7.16  % Albumin, Urine--  Hematocrit 34.6  Hemoglobin 10.1  CBC  Date: 01-01-25 @ 20:24  Mean cell Mmbkvynodp63.3  Mean cell Hemoglobin Conc29.9  Mean cell Volum 74.7  Platelet count-Automate 519  RBC Count 4.70  Red Cell Distrib Width17.5  WBC Count8.12  % Albumin, Urine--  Hematocrit 35.1  Hemoglobin 10.5    [facility]  01-03-25 @ 07:01  Blood Gas Arterial-Calcium,Ionized--  Blood Urea Nitrogen, Serum 5 mg/dL[L] [10 - 20]  Carbon Dioxide, Serum22 mmol/L [17 - 32]  Chloride, Ceooe191 mmol/L [98 - 110]  Creatinie, Serum0.5 mg/dL[L] [0.7 - 1.5]  Glucose, Serum59 mg/dL[L] [70 - 99]  Potassium, Serum4.7 mmol/L [3.5 - 5.0]  Sodium, Serum 138 mmol/L [135 - 146]  [facility]  01-01-25 @ 20:24  Blood Gas Arterial-Calcium,Ionized--  Blood Urea Nitrogen, Serum 8 mg/dL[L] [10 - 20]  Carbon Dioxide, Serum22 mmol/L [17 - 32]  Chloride, Serum98 mmol/L [98 - 110]  Creatinie, Serum0.6 mg/dL[L] [0.7 - 1.5]  Glucose, Ymnwb152 mg/dL[H] [70 - 99]  Potassium, Serum4.1 mmol/L [3.5 - 5.0]  Sodium, Serum 134 mmol/L[L] [135 - 146]              Microbiology:    Urinalysis with Rflx Culture (collected 12-19-24 @ 13:53)    Urinalysis with Rflx Culture (collected 12-18-24 @ 22:08)      Medications:  acetaminophen     Tablet .. 650 milliGRAM(s) Oral every 6 hours PRN  amLODIPine   Tablet 5 milliGRAM(s) Oral daily  benzonatate 100 milliGRAM(s) Oral three times a day PRN  dextrose 5%. 1000 milliLiter(s) IV Continuous <Continuous>  dextrose 5%. 1000 milliLiter(s) IV Continuous <Continuous>  dextrose 50% Injectable 25 Gram(s) IV Push once  dextrose 50% Injectable 12.5 Gram(s) IV Push once  dextrose 50% Injectable 25 Gram(s) IV Push once  dextrose Oral Gel 15 Gram(s) Oral once  ferrous    sulfate 325 milliGRAM(s) Oral two times a day  glucagon  Injectable 1 milliGRAM(s) IntraMuscular once  guaiFENesin  milliGRAM(s) Oral every 12 hours PRN  melatonin 3 milliGRAM(s) Oral at bedtime PRN  metoprolol tartrate 25 milliGRAM(s) Oral three times a day  ondansetron Injectable 4 milliGRAM(s) IV Push every 8 hours PRN  pantoprazole    Tablet 40 milliGRAM(s) Oral before breakfast  sertraline 100 milliGRAM(s) Oral daily        Assessment and Plan:  Patient is a 39 year old female with PMH of DM1 (insulin pump),depression, anxiety ,asthma, GERD, HTN and obesity who presents to the hospital for a one week history of nausea, vomiting, recently tx. for URI.    # Loose bm's on 1/4/25- pending GI PCR    #Nausea/Vomiting- resolved  #Recently tx. Flu A  suspect due to viral gastroenteritis  Dx with flu 12/23 when pt was admitted under psych. She was discharged on 12/31, but could not tolerate any food/meds at home  - advanced diet as tolerated       #DM1 -patient has insulin pump  - Endo on board for pump management, decreased settings to avoid hypoglycemia     #HTN  -c/w amlodipine   -c/w metoprolol (patient says she was prescribed it by Dr. Hopper for sinus tachycardia)     # h/o UTI- repeated u/a neg, d/c abx.     # Iron def. anemia - started  on daily iron tx.       DVT, Lovenox    #Progress Note Handoff: obtain GI pcr, monitor for bm's.  pt. adjusting insulin dose with ins. pump. to avoid hypoglycemia  Family discussion: current medical plan of tx. d/w pt. by bedside Disposition: Home__ in 24 to 48 hrs    Total time spent to complete patient's bedside assessment, review medical chart, discuss medical plan of care with covering medical team was more than 35 minutes with >50% of time spent face to face with patient, discussion with patient/family and/or coordination of care .  
  CRISTI MONTGOMERY  [facility] 3A 025 A ([facility])      Patient was evaluated and examined  by bedside, c/o mild abdominal cramping, 1 loose bm in am , no nausea, no dysuria       REVIEW OF SYSTEMS:  please see pertinent positives mentioned above, all other 12 ROS negative      T(C): , Max: 36.7 (01-04-25 @ 19:58)  HR: 84 (01-05-25 @ 12:25)  BP: 118/62 (01-05-25 @ 12:25)  RR: 18 (01-05-25 @ 12:25)  SpO2: 100% (01-05-25 @ 12:25)  CAPILLARY BLOOD GLUCOSE      POCT Blood Glucose.: 116 mg/dL (05 Jan 2025 12:13)  POCT Blood Glucose.: 136 mg/dL (05 Jan 2025 07:35)  POCT Blood Glucose.: 210 mg/dL (04 Jan 2025 20:50)  POCT Blood Glucose.: 148 mg/dL (04 Jan 2025 16:51)      PHYSICAL EXAM:  General: NAD, AAOX3, patient is laying comfortably in bed  HEENT: AT, NC, Supple, NO JVD, NO CB  Lungs: CTA B/L, no wheezing, no rhonchi  CVS: normal S1, S2, RRR, NO M/G/R  Abdomen: soft, bowel sounds present, non-tender, non-distended  Extremities: no edema, no clubbing, no cyanosis, positive peripheral pulses b/l  Neuro: no acute focal neurological deficits  Skin: no rush, no ecchymosis      LAB  CBC  Date: 01-03-25 @ 07:01  Mean cell Crwivwduhx96.1  Mean cell Hemoglobin Conc29.2  Mean cell Volum 75.5  Platelet count-Automate 511  RBC Count 4.58  Red Cell Distrib Width17.6  WBC Count7.16  % Albumin, Urine--  Hematocrit 34.6  Hemoglobin 10.1  CBC  Date: 01-01-25 @ 20:24  Mean cell Dmibcwwsnw82.3  Mean cell Hemoglobin Conc29.9  Mean cell Volum 74.7  Platelet count-Automate 519  RBC Count 4.70  Red Cell Distrib Width17.5  WBC Count8.12  % Albumin, Urine--  Hematocrit 35.1  Hemoglobin 10.5    BMP  01-03-25 @ 07:01  Blood Gas Arterial-Calcium,Ionized--  Blood Urea Nitrogen, Serum 5 mg/dL[L] [10 - 20]  Carbon Dioxide, Serum22 mmol/L [17 - 32]  Chloride, Ueyws771 mmol/L [98 - 110]  Creatinie, Serum0.5 mg/dL[L] [0.7 - 1.5]  Glucose, Serum59 mg/dL[L] [70 - 99]  Potassium, Serum4.7 mmol/L [3.5 - 5.0]  Sodium, Serum 138 mmol/L [135 - 146]  BMP  01-01-25 @ 20:24  Blood Gas Arterial-Calcium,Ionized--  Blood Urea Nitrogen, Serum 8 mg/dL[L] [10 - 20]  Carbon Dioxide, Serum22 mmol/L [17 - 32]  Chloride, Serum98 mmol/L [98 - 110]  Creatinie, Serum0.6 mg/dL[L] [0.7 - 1.5]  Glucose, Yfjfx971 mg/dL[H] [70 - 99]  Potassium, Serum4.1 mmol/L [3.5 - 5.0]  Sodium, Serum 134 mmol/L[L] [135 - 146]              Microbiology:    Culture - Urine (collected 01-03-25 @ 11:50)  Source: Clean Catch Clean Catch (Midstream)  Final Report (01-04-25 @ 20:29):    Culture grew 3 or more types of organisms which indicate    collection contamination; consider recollection only if clinically    indicated.    Urinalysis with Rflx Culture (collected 12-19-24 @ 13:53)    Urinalysis with Rflx Culture (collected 12-18-24 @ 22:08)        Medications:  acetaminophen     Tablet .. 650 milliGRAM(s) Oral every 6 hours PRN  amLODIPine   Tablet 5 milliGRAM(s) Oral daily  benzonatate 100 milliGRAM(s) Oral three times a day PRN  dextrose 5%. 1000 milliLiter(s) IV Continuous <Continuous>  dextrose 5%. 1000 milliLiter(s) IV Continuous <Continuous>  dextrose 50% Injectable 25 Gram(s) IV Push once  dextrose 50% Injectable 12.5 Gram(s) IV Push once  dextrose 50% Injectable 25 Gram(s) IV Push once  dextrose Oral Gel 15 Gram(s) Oral once  ferrous    sulfate 325 milliGRAM(s) Oral two times a day  glucagon  Injectable 1 milliGRAM(s) IntraMuscular once  guaiFENesin  milliGRAM(s) Oral every 12 hours PRN  melatonin 3 milliGRAM(s) Oral at bedtime PRN  metoprolol tartrate 25 milliGRAM(s) Oral three times a day  ondansetron Injectable 4 milliGRAM(s) IV Push every 8 hours PRN  pantoprazole    Tablet 40 milliGRAM(s) Oral before breakfast  sertraline 100 milliGRAM(s) Oral daily        Assessment and Plan:  Patient is a 39 year old female with PMH of DM1 (insulin pump),depression, anxiety ,asthma, GERD, HTN and obesity who presents to the hospital for a one week history of nausea, vomiting, recently tx. for URI.    # Loose bm's on 1/4/25-  GI PCR- neg   - added on Probiotics tx.    #Nausea/Vomiting- resolved  #Recently tx. Flu A  suspect due to viral gastroenteritis  Dx with flu 12/23 when pt was admitted under psych. She was discharged on 12/31, but could not tolerate any food/meds at home  - advanced diet as tolerated       #DM1 -patient has insulin pump  - Endo on board for pump management, decreased settings to avoid hypoglycemia     #HTN  -c/w amlodipine   -c/w metoprolol (patient says she was prescribed it by Dr. Hopper for sinus tachycardia)     # h/o UTI- repeated u/a neg, d/c abx.     # Iron def. anemia - started  on daily iron tx.       DVT, Lovenox    #Progress Note Handoff: patient was medically optimized, stable for d/c to adult home tomorrow( [facility] does not accept pts on sunday)  Family discussion: current medical plan of tx. d/w pt. by bedside Disposition: Adult home tomorrow    Total time spent to complete patient's bedside assessment, review medical chart, discuss medical plan of care with covering medical team was more than 35 minutes with >50% of time spent face to face with patient, discussion with patient/family and/or coordination of care .      
  CRISTI MONTGOMERY  [facility] 3A 025 A ([facility])      Patient was evaluated and examined  by bedside, tolerating diet well, c/o mild nausea, 1 loose bm in am, no diarrhea at night, no fever, mild abdominal cramping post iron po tx.      REVIEW OF SYSTEMS:  please see pertinent positives mentioned above, all other 12 ROS negative      T(C): , Max: 36.8 (01-05-25 @ 19:53)  HR: 85 (01-06-25 @ 05:00)  BP: 121/69 (01-06-25 @ 05:00)  RR: 18 (01-06-25 @ 05:00)  SpO2: 97% (01-06-25 @ 05:00)  CAPILLARY BLOOD GLUCOSE      POCT Blood Glucose.: 96 mg/dL (06 Jan 2025 07:39)  POCT Blood Glucose.: 130 mg/dL (05 Jan 2025 21:46)  POCT Blood Glucose.: 98 mg/dL (05 Jan 2025 17:16)  POCT Blood Glucose.: 116 mg/dL (05 Jan 2025 12:13)      PHYSICAL EXAM:  General: NAD, AAOX3, patient is laying comfortably in bed  HEENT: AT, NC, Supple, NO JVD, NO CB  Lungs: CTA B/L, no wheezing, no rhonchi  CVS: normal S1, S2, RRR, NO M/G/R  Abdomen: soft, bowel sounds present, non-tender, non-distended  Extremities: no edema, no clubbing, no cyanosis, positive peripheral pulses b/l  Neuro: no acute focal neurological deficits  Skin: no rash, no ecchymosis      LAB  CBC  Date: 01-06-25 @ 07:30  Mean cell Lmkkxntung75.2  Mean cell Hemoglobin Conc29.1  Mean cell Volum 76.3  Platelet count-Automate 574  RBC Count 4.59  Red Cell Distrib Width18.0  WBC Count7.59  % Albumin, Urine--  Hematocrit 35.0  Hemoglobin 10.2  CBC  Date: 01-03-25 @ 07:01  Mean cell Wlopgetmwk69.1  Mean cell Hemoglobin Conc29.2  Mean cell Volum 75.5  Platelet count-Automate 511  RBC Count 4.58  Red Cell Distrib Width17.6  WBC Count7.16  % Albumin, Urine--  Hematocrit 34.6  Hemoglobin 10.1  CBC  Date: 01-01-25 @ 20:24  Mean cell Nbgwrxpukp95.3  Mean cell Hemoglobin Conc29.9  Mean cell Volum 74.7  Platelet count-Automate 519  RBC Count 4.70  Red Cell Distrib Width17.5  WBC Count8.12  % Albumin, Urine--  Hematocrit 35.1  Hemoglobin 10.5    [facility]  01-06-25 @ 07:30  Blood Gas Arterial-Calcium,Ionized--  Blood Urea Nitrogen, Serum 8 mg/dL[L] [10 - 20]  Carbon Dioxide, Serum24 mmol/L [17 - 32]  Chloride, Ubkon408 mmol/L [98 - 110]  Creatinie, Serum0.6 mg/dL[L] [0.7 - 1.5]  Glucose, Zdgjw153 mg/dL[H] [70 - 99]  Potassium, Serum4.8 mmol/L [3.5 - 5.0]  Sodium, Serum 141 mmol/L [135 - 146]  [facility]  01-03-25 @ 07:01  Blood Gas Arterial-Calcium,Ionized--  Blood Urea Nitrogen, Serum 5 mg/dL[L] [10 - 20]  Carbon Dioxide, Serum22 mmol/L [17 - 32]  Chloride, Mgygs907 mmol/L [98 - 110]  Creatinie, Serum0.5 mg/dL[L] [0.7 - 1.5]  Glucose, Serum59 mg/dL[L] [70 - 99]  Potassium, Serum4.7 mmol/L [3.5 - 5.0]  Sodium, Serum 138 mmol/L [135 - 146]  [facility]  01-01-25 @ 20:24  Blood Gas Arterial-Calcium,Ionized--  Blood Urea Nitrogen, Serum 8 mg/dL[L] [10 - 20]  Carbon Dioxide, Serum22 mmol/L [17 - 32]  Chloride, Serum98 mmol/L [98 - 110]  Creatinie, Serum0.6 mg/dL[L] [0.7 - 1.5]  Glucose, Oqsve256 mg/dL[H] [70 - 99]  Potassium, Serum4.1 mmol/L [3.5 - 5.0]  Sodium, Serum 134 mmol/L[L] [135 - 146]      Microbiology:    Culture - Urine (collected 01-03-25 @ 11:50)  Source: Clean Catch Clean Catch (Midstream)  Final Report (01-04-25 @ 20:29):    Culture grew 3 or more types of organisms which indicate    collection contamination; consider recollection only if clinically    indicated.    Urinalysis with Rflx Culture (collected 12-19-24 @ 13:53)    Urinalysis with Rflx Culture (collected 12-18-24 @ 22:08)      Medications:  acetaminophen     Tablet .. 650 milliGRAM(s) Oral every 6 hours PRN  amLODIPine   Tablet 5 milliGRAM(s) Oral daily  benzonatate 100 milliGRAM(s) Oral three times a day PRN  dextrose 5%. 1000 milliLiter(s) IV Continuous <Continuous>  dextrose 5%. 1000 milliLiter(s) IV Continuous <Continuous>  dextrose 50% Injectable 25 Gram(s) IV Push once  dextrose 50% Injectable 12.5 Gram(s) IV Push once  dextrose 50% Injectable 25 Gram(s) IV Push once  dextrose Oral Gel 15 Gram(s) Oral once  ferrous    sulfate 325 milliGRAM(s) Oral two times a day  glucagon  Injectable 1 milliGRAM(s) IntraMuscular once  lactobacillus acidophilus 1 Tablet(s) Oral two times a day with meals  melatonin 3 milliGRAM(s) Oral at bedtime PRN  metoprolol tartrate 25 milliGRAM(s) Oral three times a day  ondansetron    Tablet 4 milliGRAM(s) Oral once  ondansetron Injectable 4 milliGRAM(s) IV Push every 8 hours PRN  pantoprazole    Tablet 40 milliGRAM(s) Oral before breakfast  sertraline 100 milliGRAM(s) Oral daily  simethicone 80 milliGRAM(s) Chew every 6 hours PRN        Assessment and Plan:  Patient is a 39 year old female with PMH of DM1 (insulin pump),depression, anxiety ,asthma, GERD, HTN and obesity who presents to the hospital for a one week history of nausea, vomiting, recently tx. for URI.    # Loose bm's on 1/4/25-  GI PCR- neg   - added on Probiotics tx. x 1 week    #Nausea/Vomiting- resolved  #Recently tx. Flu A  suspect due to viral gastroenteritis  Dx with flu 12/23 when pt was admitted under psych. She was discharged on 12/31, but could not tolerate any food/meds at home  - GI PCR stool neg   - patient tolerating diet will with minimal nausea after iron tx. , prescribed zofran prn. tx. for next 3 days post d/c home     #DM1 -patient has insulin pump  - Endo on board for pump management, decreased settings to avoid hypoglycemia   - Patient was instructed to f/up with outpatient Endocrinology specialist for further insulin pump dose adjustment     #HTN  -c/w amlodipine   -c/w metoprolol (patient says she was prescribed it by Dr. Hopper for sinus tachycardia)     # h/o UTI- repeated u/a neg, d/c abx.     # Iron def. anemia - started  on daily iron tx. - due to mild abdominal cramping/flatulence side effect, patient was prescribed Gas-X prn. tx.   - outpatient PCP f/up for further outpatient management for Iron def. anemia     # Chronic Anxiety /Depression- stable, continue home regimen tx, outpatient Psychiatry specialist f/up       DVT, Lovenox    #Progress Note Handoff: patient is medically stable for d/c to adult home   Family discussion: Patient verbalized good understanding of discharge instructions and agreed with discharge plan.   Disposition: Adult home today     Total time spent to complete patient's bedside assessment, review medical chart, discuss medical plan of care with covering medical team was more than 35 minutes with >50% of time spent face to face with patient, discussion with patient/family and/or coordination of care .              
SUBJECTIVE/OVERNIGHT EVENTS  Today is hospital day 1d. This morning patient was seen and examined at bedside, resting comfortably in bed. No acute or major events overnight.    MEDICATIONS  STANDING MEDICATIONS  amLODIPine   Tablet 5 milliGRAM(s) Oral daily  dextrose 5%. 1000 milliLiter(s) IV Continuous <Continuous>  dextrose 5%. 1000 milliLiter(s) IV Continuous <Continuous>  dextrose 50% Injectable 25 Gram(s) IV Push once  dextrose 50% Injectable 12.5 Gram(s) IV Push once  dextrose 50% Injectable 25 Gram(s) IV Push once  dextrose Oral Gel 15 Gram(s) Oral once  glucagon  Injectable 1 milliGRAM(s) IntraMuscular once  lactated ringers. 1000 milliLiter(s) IV Continuous <Continuous>  metoprolol tartrate 25 milliGRAM(s) Oral three times a day  pantoprazole    Tablet 40 milliGRAM(s) Oral before breakfast  sertraline 100 milliGRAM(s) Oral daily  trimethoprim  160 mG/sulfamethoxazole 800 mG 1 Tablet(s) Oral every 12 hours    PRN MEDICATIONS  acetaminophen     Tablet .. 650 milliGRAM(s) Oral every 6 hours PRN  benzonatate 100 milliGRAM(s) Oral three times a day PRN  guaiFENesin  milliGRAM(s) Oral every 12 hours PRN  melatonin 3 milliGRAM(s) Oral at bedtime PRN  ondansetron Injectable 4 milliGRAM(s) IV Push every 8 hours PRN    VITALS  T(F): 97.7 (01-03-25 @ 05:00), Max: 97.9 (01-02-25 @ 17:15)  HR: 81 (01-03-25 @ 05:00) (81 - 85)  BP: 124/71 (01-03-25 @ 05:00) (116/77 - 135/79)  RR: 18 (01-03-25 @ 07:53) (18 - 18)  SpO2: 96% (01-03-25 @ 07:53) (96% - 98%)  POCT Blood Glucose.: 67 mg/dL (01-03-25 @ 08:19)  POCT Blood Glucose.: 92 mg/dL (01-02-25 @ 21:37)  POCT Blood Glucose.: 104 mg/dL (01-02-25 @ 17:04)    PHYSICAL EXAM  GENERAL  NAD, lying in bed comfortably    HEAD  Atraumatic     NECK  Supple     HEART  Regular rate and rhythm no murmurs rubs or gallops    LUNGS  Clear to auscultation bilaterally, no wheezing rales or rhonchi    ABDOMEN  soft, nontender, nondistended, +BS    EXTREMITIES  no edema    NERVOUS SYSTEM  A&Ox3     SKIN  No rashes or lesions      LABS             10.1   7.16  )-----------( 511      ( 01-03-25 @ 07:01 )             34.6     138  |  105  |  5   -------------------------<  59   01-03-25 @ 07:01  4.7  |  22  |  0.5    Ca      8.5     01-03-25 @ 07:01  Mg     2.0     01-03-25 @ 07:01    TPro  6.6  /  Alb  3.9  /  TBili  <0.2  /  DBili  x   /  AST  79  /  ALT  88  /  AlkPhos  133  /  GGT  x     01-03-25 @ 07:01        Urinalysis Basic - ( 03 Jan 2025 07:01 )    Color: x / Appearance: x / SG: x / pH: x  Gluc: 59 mg/dL / Ketone: x  / Bili: x / Urobili: x   Blood: x / Protein: x / Nitrite: x   Leuk Esterase: x / RBC: x / WBC x   Sq Epi: x / Non Sq Epi: x / Bacteria: x          IMAGING

## 2025-01-08 ENCOUNTER — APPOINTMENT (OUTPATIENT)
Dept: OTOLARYNGOLOGY | Facility: CLINIC | Age: 40
End: 2025-01-08
Payer: MEDICAID

## 2025-01-08 VITALS — BODY MASS INDEX: 31.67 KG/M2 | HEIGHT: 64 IN | WEIGHT: 185.5 LBS

## 2025-01-08 DIAGNOSIS — H93.8X3 OTHER SPECIFIED DISORDERS OF EAR, BILATERAL: ICD-10-CM

## 2025-01-08 DIAGNOSIS — H92.02 OTALGIA, LEFT EAR: ICD-10-CM

## 2025-01-08 PROCEDURE — 99213 OFFICE O/P EST LOW 20 MIN: CPT

## 2025-01-09 NOTE — BH DISCHARGE NOTE NURSING/SOCIAL WORK/PSYCH REHAB - CAREGIVER ADDRESS
Continue home dose Amlodipine and Metoprolol   Losartan remains held at dc  Resumed torsemide    [facility]

## 2025-01-12 DIAGNOSIS — Z79.899 OTHER LONG TERM (CURRENT) DRUG THERAPY: ICD-10-CM

## 2025-01-12 DIAGNOSIS — Z96.41 PRESENCE OF INSULIN PUMP (EXTERNAL) (INTERNAL): ICD-10-CM

## 2025-01-12 DIAGNOSIS — J10.1 INFLUENZA DUE TO OTHER IDENTIFIED INFLUENZA VIRUS WITH OTHER RESPIRATORY MANIFESTATIONS: ICD-10-CM

## 2025-01-12 DIAGNOSIS — E66.9 OBESITY, UNSPECIFIED: ICD-10-CM

## 2025-01-12 DIAGNOSIS — E10.649 TYPE 1 DIABETES MELLITUS WITH HYPOGLYCEMIA WITHOUT COMA: ICD-10-CM

## 2025-01-12 DIAGNOSIS — D50.9 IRON DEFICIENCY ANEMIA, UNSPECIFIED: ICD-10-CM

## 2025-01-12 DIAGNOSIS — A08.4 VIRAL INTESTINAL INFECTION, UNSPECIFIED: ICD-10-CM

## 2025-01-12 DIAGNOSIS — K21.9 GASTRO-ESOPHAGEAL REFLUX DISEASE WITHOUT ESOPHAGITIS: ICD-10-CM

## 2025-01-12 DIAGNOSIS — Z88.1 ALLERGY STATUS TO OTHER ANTIBIOTIC AGENTS: ICD-10-CM

## 2025-01-12 DIAGNOSIS — G43.909 MIGRAINE, UNSPECIFIED, NOT INTRACTABLE, WITHOUT STATUS MIGRAINOSUS: ICD-10-CM

## 2025-01-12 DIAGNOSIS — J45.909 UNSPECIFIED ASTHMA, UNCOMPLICATED: ICD-10-CM

## 2025-01-12 DIAGNOSIS — R00.0 TACHYCARDIA, UNSPECIFIED: ICD-10-CM

## 2025-01-12 DIAGNOSIS — E10.40 TYPE 1 DIABETES MELLITUS WITH DIABETIC NEUROPATHY, UNSPECIFIED: ICD-10-CM

## 2025-01-12 DIAGNOSIS — Z79.4 LONG TERM (CURRENT) USE OF INSULIN: ICD-10-CM

## 2025-01-12 DIAGNOSIS — Z88.0 ALLERGY STATUS TO PENICILLIN: ICD-10-CM

## 2025-01-12 DIAGNOSIS — R25.1 TREMOR, UNSPECIFIED: ICD-10-CM

## 2025-01-12 DIAGNOSIS — F41.9 ANXIETY DISORDER, UNSPECIFIED: ICD-10-CM

## 2025-01-12 DIAGNOSIS — M51.34 OTHER INTERVERTEBRAL DISC DEGENERATION, THORACIC REGION: ICD-10-CM

## 2025-01-12 DIAGNOSIS — Z87.440 PERSONAL HISTORY OF URINARY (TRACT) INFECTIONS: ICD-10-CM

## 2025-01-12 DIAGNOSIS — I10 ESSENTIAL (PRIMARY) HYPERTENSION: ICD-10-CM

## 2025-01-13 ENCOUNTER — APPOINTMENT (OUTPATIENT)
Dept: PSYCHIATRY | Facility: CLINIC | Age: 40
End: 2025-01-13
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
## 2025-01-16 ENCOUNTER — APPOINTMENT (OUTPATIENT)
Dept: ENDOCRINOLOGY | Facility: CLINIC | Age: 40
End: 2025-01-16
Payer: MEDICAID

## 2025-01-16 VITALS
SYSTOLIC BLOOD PRESSURE: 124 MMHG | WEIGHT: 188 LBS | OXYGEN SATURATION: 97 % | DIASTOLIC BLOOD PRESSURE: 82 MMHG | HEIGHT: 64 IN | BODY MASS INDEX: 32.1 KG/M2 | HEART RATE: 62 BPM

## 2025-01-16 DIAGNOSIS — E66.811 OBESITY, CLASS 1: ICD-10-CM

## 2025-01-16 DIAGNOSIS — E10.9 TYPE 1 DIABETES MELLITUS W/OUT COMPLICATIONS: ICD-10-CM

## 2025-01-16 DIAGNOSIS — Z96.41 PRESENCE OF INSULIN PUMP (EXTERNAL) (INTERNAL): ICD-10-CM

## 2025-01-16 PROCEDURE — 99214 OFFICE O/P EST MOD 30 MIN: CPT

## 2025-01-16 RX ORDER — CHLORHEXIDINE GLUCONATE 4 %
325 (65 FE) LIQUID (ML) TOPICAL
Refills: 0 | Status: ACTIVE | COMMUNITY

## 2025-01-22 ENCOUNTER — NON-APPOINTMENT (OUTPATIENT)
Age: 40
End: 2025-01-22

## 2025-01-22 ENCOUNTER — INPATIENT (INPATIENT)
Facility: HOSPITAL | Age: 40
LOS: 1 days | Discharge: HOME | DRG: 204 | End: 2025-01-24
Attending: INTERNAL MEDICINE | Admitting: FAMILY MEDICINE
Payer: MEDICAID

## 2025-01-22 VITALS
HEART RATE: 108 BPM | WEIGHT: 190.04 LBS | OXYGEN SATURATION: 97 % | TEMPERATURE: 97 F | RESPIRATION RATE: 16 BRPM | DIASTOLIC BLOOD PRESSURE: 101 MMHG | SYSTOLIC BLOOD PRESSURE: 130 MMHG | HEIGHT: 64 IN

## 2025-01-22 DIAGNOSIS — R55 SYNCOPE AND COLLAPSE: ICD-10-CM

## 2025-01-22 LAB
ALBUMIN SERPL ELPH-MCNC: 4.1 G/DL — SIGNIFICANT CHANGE UP (ref 3.5–5.2)
ALP SERPL-CCNC: 132 U/L — HIGH (ref 30–115)
ALT FLD-CCNC: 46 U/L — HIGH (ref 0–41)
ANION GAP SERPL CALC-SCNC: 12 MMOL/L — SIGNIFICANT CHANGE UP (ref 7–14)
APTT BLD: 31.2 SEC — SIGNIFICANT CHANGE UP (ref 27–39.2)
AST SERPL-CCNC: 43 U/L — HIGH (ref 0–41)
BASOPHILS # BLD AUTO: 0.06 K/UL — SIGNIFICANT CHANGE UP (ref 0–0.2)
BASOPHILS NFR BLD AUTO: 0.3 % — SIGNIFICANT CHANGE UP (ref 0–1)
BILIRUB SERPL-MCNC: <0.2 MG/DL — SIGNIFICANT CHANGE UP (ref 0.2–1.2)
BUN SERPL-MCNC: 10 MG/DL — SIGNIFICANT CHANGE UP (ref 10–20)
CALCIUM SERPL-MCNC: 9.1 MG/DL — SIGNIFICANT CHANGE UP (ref 8.4–10.5)
CHLORIDE SERPL-SCNC: 99 MMOL/L — SIGNIFICANT CHANGE UP (ref 98–110)
CK SERPL-CCNC: 54 U/L — SIGNIFICANT CHANGE UP (ref 0–225)
CO2 SERPL-SCNC: 25 MMOL/L — SIGNIFICANT CHANGE UP (ref 17–32)
CREAT SERPL-MCNC: 0.6 MG/DL — LOW (ref 0.7–1.5)
EGFR: 117 ML/MIN/1.73M2 — SIGNIFICANT CHANGE UP
EOSINOPHIL # BLD AUTO: 0.13 K/UL — SIGNIFICANT CHANGE UP (ref 0–0.7)
EOSINOPHIL NFR BLD AUTO: 0.7 % — SIGNIFICANT CHANGE UP (ref 0–8)
FLUAV AG NPH QL: SIGNIFICANT CHANGE UP
FLUBV AG NPH QL: SIGNIFICANT CHANGE UP
GLUCOSE BLDC GLUCOMTR-MCNC: 132 MG/DL — HIGH (ref 70–99)
GLUCOSE SERPL-MCNC: 57 MG/DL — LOW (ref 70–99)
HCG SERPL QL: NEGATIVE — SIGNIFICANT CHANGE UP
HCT VFR BLD CALC: 38.9 % — SIGNIFICANT CHANGE UP (ref 37–47)
HGB BLD-MCNC: 12.1 G/DL — SIGNIFICANT CHANGE UP (ref 12–16)
IMM GRANULOCYTES NFR BLD AUTO: 1.2 % — HIGH (ref 0.1–0.3)
INR BLD: 1.09 RATIO — SIGNIFICANT CHANGE UP (ref 0.65–1.3)
LACTATE SERPL-SCNC: 1.7 MMOL/L — SIGNIFICANT CHANGE UP (ref 0.7–2)
LACTATE SERPL-SCNC: 2.3 MMOL/L — HIGH (ref 0.7–2)
LYMPHOCYTES # BLD AUTO: 1.9 K/UL — SIGNIFICANT CHANGE UP (ref 1.2–3.4)
LYMPHOCYTES # BLD AUTO: 10.4 % — LOW (ref 20.5–51.1)
MCHC RBC-ENTMCNC: 24.2 PG — LOW (ref 27–31)
MCHC RBC-ENTMCNC: 31.1 G/DL — LOW (ref 32–37)
MCV RBC AUTO: 77.8 FL — LOW (ref 81–99)
MONOCYTES # BLD AUTO: 0.89 K/UL — HIGH (ref 0.1–0.6)
MONOCYTES NFR BLD AUTO: 4.9 % — SIGNIFICANT CHANGE UP (ref 1.7–9.3)
NEUTROPHILS # BLD AUTO: 14.99 K/UL — HIGH (ref 1.4–6.5)
NEUTROPHILS NFR BLD AUTO: 82.5 % — HIGH (ref 42.2–75.2)
NRBC # BLD: 0 /100 WBCS — SIGNIFICANT CHANGE UP (ref 0–0)
NRBC BLD-RTO: 0 /100 WBCS — SIGNIFICANT CHANGE UP (ref 0–0)
NT-PROBNP SERPL-SCNC: 88 PG/ML — SIGNIFICANT CHANGE UP (ref 0–300)
PLATELET # BLD AUTO: 361 K/UL — SIGNIFICANT CHANGE UP (ref 130–400)
PMV BLD: 9.6 FL — SIGNIFICANT CHANGE UP (ref 7.4–10.4)
POTASSIUM SERPL-MCNC: 3.8 MMOL/L — SIGNIFICANT CHANGE UP (ref 3.5–5)
POTASSIUM SERPL-SCNC: 3.8 MMOL/L — SIGNIFICANT CHANGE UP (ref 3.5–5)
PROT SERPL-MCNC: 7.9 G/DL — SIGNIFICANT CHANGE UP (ref 6–8)
PROTHROM AB SERPL-ACNC: 12.9 SEC — HIGH (ref 9.95–12.87)
RBC # BLD: 5 M/UL — SIGNIFICANT CHANGE UP (ref 4.2–5.4)
RBC # FLD: 23.9 % — HIGH (ref 11.5–14.5)
RSV RNA NPH QL NAA+NON-PROBE: SIGNIFICANT CHANGE UP
SARS-COV-2 RNA SPEC QL NAA+PROBE: SIGNIFICANT CHANGE UP
SODIUM SERPL-SCNC: 136 MMOL/L — SIGNIFICANT CHANGE UP (ref 135–146)
TROPONIN T, HIGH SENSITIVITY RESULT: 131 NG/L — CRITICAL HIGH (ref 6–13)
TROPONIN T, HIGH SENSITIVITY RESULT: 66 NG/L — CRITICAL HIGH (ref 6–13)
WBC # BLD: 18.19 K/UL — HIGH (ref 4.8–10.8)
WBC # FLD AUTO: 18.19 K/UL — HIGH (ref 4.8–10.8)

## 2025-01-22 PROCEDURE — 87899 AGENT NOS ASSAY W/OPTIC: CPT

## 2025-01-22 PROCEDURE — 99222 1ST HOSP IP/OBS MODERATE 55: CPT

## 2025-01-22 PROCEDURE — 71045 X-RAY EXAM CHEST 1 VIEW: CPT | Mod: 26

## 2025-01-22 PROCEDURE — 84100 ASSAY OF PHOSPHORUS: CPT

## 2025-01-22 PROCEDURE — 71045 X-RAY EXAM CHEST 1 VIEW: CPT

## 2025-01-22 PROCEDURE — 84145 PROCALCITONIN (PCT): CPT

## 2025-01-22 PROCEDURE — 70496 CT ANGIOGRAPHY HEAD: CPT | Mod: MC

## 2025-01-22 PROCEDURE — 82550 ASSAY OF CK (CPK): CPT

## 2025-01-22 PROCEDURE — 99285 EMERGENCY DEPT VISIT HI MDM: CPT

## 2025-01-22 PROCEDURE — 70498 CT ANGIOGRAPHY NECK: CPT | Mod: MC

## 2025-01-22 PROCEDURE — 83605 ASSAY OF LACTIC ACID: CPT

## 2025-01-22 PROCEDURE — 92610 EVALUATE SWALLOWING FUNCTION: CPT | Mod: GN

## 2025-01-22 PROCEDURE — 87449 NOS EACH ORGANISM AG IA: CPT

## 2025-01-22 PROCEDURE — 93307 TTE W/O DOPPLER COMPLETE: CPT

## 2025-01-22 PROCEDURE — 71275 CT ANGIOGRAPHY CHEST: CPT | Mod: 26

## 2025-01-22 PROCEDURE — 95819 EEG AWAKE AND ASLEEP: CPT

## 2025-01-22 PROCEDURE — 85730 THROMBOPLASTIN TIME PARTIAL: CPT

## 2025-01-22 PROCEDURE — 85610 PROTHROMBIN TIME: CPT

## 2025-01-22 PROCEDURE — 0225U NFCT DS DNA&RNA 21 SARSCOV2: CPT

## 2025-01-22 PROCEDURE — 0042T: CPT | Mod: MC

## 2025-01-22 PROCEDURE — 97162 PT EVAL MOD COMPLEX 30 MIN: CPT | Mod: GP

## 2025-01-22 PROCEDURE — 93010 ELECTROCARDIOGRAM REPORT: CPT | Mod: 76

## 2025-01-22 PROCEDURE — 82553 CREATINE MB FRACTION: CPT

## 2025-01-22 PROCEDURE — 74018 RADEX ABDOMEN 1 VIEW: CPT

## 2025-01-22 PROCEDURE — 82962 GLUCOSE BLOOD TEST: CPT

## 2025-01-22 PROCEDURE — 85027 COMPLETE CBC AUTOMATED: CPT

## 2025-01-22 PROCEDURE — 70450 CT HEAD/BRAIN W/O DYE: CPT | Mod: MC

## 2025-01-22 PROCEDURE — 84484 ASSAY OF TROPONIN QUANT: CPT

## 2025-01-22 PROCEDURE — 85025 COMPLETE CBC W/AUTO DIFF WBC: CPT

## 2025-01-22 PROCEDURE — 97165 OT EVAL LOW COMPLEX 30 MIN: CPT | Mod: GO

## 2025-01-22 PROCEDURE — 87641 MR-STAPH DNA AMP PROBE: CPT

## 2025-01-22 PROCEDURE — 83735 ASSAY OF MAGNESIUM: CPT

## 2025-01-22 PROCEDURE — 93005 ELECTROCARDIOGRAM TRACING: CPT

## 2025-01-22 PROCEDURE — 80053 COMPREHEN METABOLIC PANEL: CPT

## 2025-01-22 PROCEDURE — 36415 COLL VENOUS BLD VENIPUNCTURE: CPT

## 2025-01-22 PROCEDURE — 87640 STAPH A DNA AMP PROBE: CPT

## 2025-01-22 RX ORDER — AMLODIPINE BESYLATE 5 MG
5 TABLET ORAL DAILY
Refills: 0 | Status: DISCONTINUED | OUTPATIENT
Start: 2025-01-22 | End: 2025-01-24

## 2025-01-22 RX ORDER — AZTREONAM 500 MG
2000 VIAL (EA) INJECTION ONCE
Refills: 0 | Status: COMPLETED | OUTPATIENT
Start: 2025-01-22 | End: 2025-01-22

## 2025-01-22 RX ORDER — CEFPODOXIME PROXETIL 200 MG
100 TABLET ORAL ONCE
Refills: 0 | Status: DISCONTINUED | OUTPATIENT
Start: 2025-01-22 | End: 2025-01-22

## 2025-01-22 RX ORDER — BACTERIOSTATIC SODIUM CHLORIDE 0.9 %
1000 VIAL (ML) INJECTION ONCE
Refills: 0 | Status: COMPLETED | OUTPATIENT
Start: 2025-01-22 | End: 2025-01-22

## 2025-01-22 RX ORDER — DOXYCYCLINE HYCLATE 100 MG/1
100 CAPSULE ORAL ONCE
Refills: 0 | Status: DISCONTINUED | OUTPATIENT
Start: 2025-01-22 | End: 2025-01-22

## 2025-01-22 RX ORDER — METOPROLOL SUCCINATE 25 MG
25 TABLET, EXTENDED RELEASE 24 HR ORAL THREE TIMES A DAY
Refills: 0 | Status: DISCONTINUED | OUTPATIENT
Start: 2025-01-22 | End: 2025-01-24

## 2025-01-22 RX ORDER — DOXYCYCLINE HYCLATE 100 MG/1
100 CAPSULE ORAL ONCE
Refills: 0 | Status: COMPLETED | OUTPATIENT
Start: 2025-01-22 | End: 2025-01-22

## 2025-01-22 RX ORDER — SERTRALINE HCL 100 MG
100 TABLET ORAL DAILY
Refills: 0 | Status: DISCONTINUED | OUTPATIENT
Start: 2025-01-22 | End: 2025-01-24

## 2025-01-22 RX ADMIN — DOXYCYCLINE HYCLATE 100 MILLIGRAM(S): 100 CAPSULE ORAL at 19:45

## 2025-01-22 RX ADMIN — Medication 1000 MILLILITER(S): at 18:07

## 2025-01-22 RX ADMIN — Medication 50 MILLIGRAM(S): at 19:45

## 2025-01-22 NOTE — H&P ADULT - HISTORY OF PRESENT ILLNESS
40 yo female presents to ed for evaluation of syncope. patient states she went to urgent care and was diagnosed with bronchitis. patient then went foot shopping and had a syncopal episode. patient does not remember what happened.  patient denies shortness of breath or chest pain. patient states she has body aches. Of note patient was recently ill with RSV and norovirus now resolved,  In the ED /101 hr 108 rr 16 afebrile saturating 97% on ra   Sig[employer]abs WBC 18 HST 66 repeat 131 ast 43 alt short rvp negative  Urinalysis Basic - ( 22 Jan 2025 17:53 )    Color: x / Appearance: x / SG: x / pH: x  Gluc: 57 mg/dL / Ketone: x  / Bili: x / Urobili: x   Blood: x / Protein: x / Nitrite: x     Leuk Esterase: x / RBC: x / WBC x   Sq Epi: x / Non Sq Epi: x / Bacteria: x      CT angio chest PE protocol negative for pe or acute thoracic pathology.

## 2025-01-22 NOTE — ED ADULT NURSE NOTE - NSFALLUNIVINTERV_ED_ALL_ED
Bed/Stretcher in lowest position, wheels locked, appropriate side rails in place/Call bell, personal items and telephone in reach/Instruct patient to call for assistance before getting out of bed/chair/stretcher/Non-slip footwear applied when patient is off stretcher/[place] to call system/Physically safe environment - no spills, clutter or unnecessary equipment/Purposeful proactive rounding/Room/bathroom lighting operational, light cord in reach

## 2025-01-22 NOTE — ED PROVIDER NOTE - CLINICAL SUMMARY MEDICAL DECISION MAKING FREE TEXT BOX
39-year-old female insulin-dependent diabetic well-known to me and endorsed to me at 1900 was brought in by EMS after syncopal episode with no prodrome, patient does have a history of syncope, initially denied chest pain though admitted to some chest and left shoulder pain, no headache, no shortness of breath, no leg pain or swelling, exam unremarkable, labs and studies appreciated, was treated for possible pneumonia on x-ray though none on CT, repeat troponin significantly elevated, repeat EKG unremarkable, POCUS with normal EF and trace pericardial effusion, discussed with intensivist, will admit stepdown

## 2025-01-22 NOTE — ED PROVIDER NOTE - OBJECTIVE STATEMENT
this is a 40 yo female presents to ed for evaluation of syncope. patient states she went to urgent care and was diagnosed with bronchitis. patient then went foot shopping and had a syncopal episode. patient does not remember what happened.  patient denies shortness of breath or chest pain. patient states she has body aches

## 2025-01-22 NOTE — H&P ADULT - NSVTERISKREFERASSESS_GEN_ALL_CORE
PT DAILY TREATMENT NOTE - [facility] 3-16    Patient Name: Ara Ochoa. Date:10/18/2017  : 1934  [x]  Patient  Verified  Payor: Lorraine Samano / Plan: [place] MED[place] & B / Product Type: Medicare /    In time:9:00  Out time:9:30  Total Treatment Time (min): 30  Total Timed Codes (min): 30  1:1 Treatment Time ( only): 30   Visit #: 7 of     Treatment Area: Pain in left hip [M25.552]  Bursitis of left hip [M70.72]    SUBJECTIVE  Pain Level (0-10 scale): 0  Any medication changes, allergies to medications, adverse drug reactions, diagnosis change, or new procedure performed?: [x] No    [] Yes (see summary sheet for update)  Subjective functional status/changes:   [] No changes reported  I still get pain from time to time, for no apparent reason. I woke up Tuesday morning and could barely walk. Took Tylenol and eventually a pain a pill. By around 4:00 I felt better. But I don't know why it happened. OBJECTIVE    30 min Therapeutic Exercise:  [] See flow sheet :   Rationale: increase ROM and increase strength to improve the patients ability to walk, perform daily tasks          With   [] TE   [] TA   [] neuro   [] other: Patient Education: [x] Review HEP    [] Progressed/Changed HEP based on:   [] positioning   [] body mechanics   [] transfers   [] heat/ice application    [] other:      Other Objective/Functional Measures:   Changed SL abd to 2 # weight from BTB.  Pt reports that ex as being the most difficult and onset of pain after PT     Pain Level (0-10 scale) post treatment: 0    ASSESSMENT/Changes in Function: overall function has improved, but stil has episodes of pain effeting activity tolerance    Patient will continue to benefit from skilled PT services to modify and progress therapeutic interventions, address functional mobility deficits, address ROM deficits, address strength deficits, analyze and address soft tissue restrictions, analyze and cue movement patterns, analyze and modify body mechanics/ergonomics, assess and modify postural abnormalities, address imbalance/dizziness and instruct in home and community integration to attain remaining goals. []  See Plan of Care  []  See progress note/recertification  []  See Discharge Summary         Progress towards goals / Updated goals:  Goal: Patient will report compliance with HEP at least 1x/day to aid in rehabilitation program.  Status at last note/certification: Established  Current status: MET. Pt reports compliance. Goal: Patient will report decrease in pain of less than 5/10 to aid in completion of ADLs  Status at last note/certification: 0/76  Current status:    Long Term Goals: To be accomplished in 4 weeks:  Goal: Patient will increase strength to 5/5 throughout B LEs to aid in completion of ADLs. Status at last note/certification: 4/5   Current status:    Goal: Patient will report pain less than 1-2/10 average to aid in completion of ADLs. Status at last note/certification: Best 8/98, Worst 8/10  Current status:  worst: 5-6/10, Best:  1-2/10 an \"awareness\" not pain  Progressing  Goal: Patient ambulation for 10mins on level surface with no AD and pain free. Status at last note/certification: Painful, Left LE collapses   Current status:  can walk to [place] with no difficulty, a mile.  But can walk up to 3 miles  Goal: Patient will improve FOTO to TBD points overall to demonstrate improvement in functional ability.    Status at last note/certification: 37; Goal 59  Current status:  FOTO 62, goal Met    PLAN  [x]  Upgrade activities as tolerated     []  Continue plan of care  []  Update interventions per flow sheet       []  Discharge due to:_  []  Other:_      Yari Cisse PTA 10/18/2017  9:12 AM Refer to the Assessment tab to view/cancel completed assessment.

## 2025-01-22 NOTE — H&P ADULT - NSHPREVIEWOFSYSTEMS_GEN_ALL_CORE
REVIEW OF SYSTEMS:    CONSTITUTIONAL: weakness  EYES/ENT: No visual changes;  No vertigo or throat pain   NECK: No pain or stiffness  RESPIRATORY: No cough, wheezing, hemoptysis; No shortness of breath  CARDIOVASCULAR: No chest pain or palpitations  GASTROINTESTINAL: No abdominal or epigastric pain. No nausea, vomiting, or hematemesis; No diarrhea or constipation. No melena or hematochezia.  GENITOURINARY: No dysuria, frequency or hematuria  NEUROLOGICAL: No numbness or weakness  MSK: myalgias+++++  SKIN: No itching, rashes

## 2025-01-22 NOTE — H&P ADULT - ASSESSMENT
IMPRESSION:  #NSTEMI  #Ho DM1  #Ho HTN      PLAN:    CNS: Avoid Sedatives. Continue home psych meds check qTC.    HEENT: Oral care    PULMONARY:  CT chest noted. HOB @ 45 degrees. O2 Sat > 92%. Nebs prn.    CARDIOVASCULAR: Repeat ECG now. Trend troponin. TTE MAP > 65. Continue home antihypertensives. Cardio eval     GI: GI prophylaxis.  Feeding.  Bowel regimen     RENAL:  Follow up lytes.  Correct as needed    INFECTIOUS DISEASE: Follow up cultures. Procal. Monitor off antibiotics.     HEMATOLOGICAL:  DVT prophylaxis.    ENDOCRINE:  Patient with insulin pump will run out of insulin tomorrow. Endocrine. If runs out sooner initiate basal/bolus.    MUSCULOSKELETAL: Increase as Tolerated    Dispo; SDU   IMPRESSION:  #NSTEMI  #Ho DM1  #Ho HTN      PLAN:    CNS: Avoid Sedatives. Continue home psych meds check qTC.    HEENT: Oral care    PULMONARY:  CT chest noted. HOB @ 45 degrees. O2 Sat > 92%. Nebs prn.    CARDIOVASCULAR: Repeat ECG now. Trend troponin. TTE MAP > 65. Continue home antihypertensives. Cardio eval     GI: GI prophylaxis.  Feeding.  Bowel regimen     RENAL:  Follow up lytes.  Correct as needed    INFECTIOUS DISEASE: Follow up cultures. Procal. Urine strep/legionella. s/p doxy azactam in ed. Continue doxycycline f/u nasal mrsa.     HEMATOLOGICAL:  DVT prophylaxis.    ENDOCRINE:  Patient with insulin pump will run out of insulin tomorrow. Endocrine. If runs out sooner initiate basal/bolus.    MUSCULOSKELETAL: Increase as Tolerated    Dispo; SDU   IMPRESSION:  #NSTEMI  #URI doubt CAP   #Ho DM1  #Ho HTN      PLAN:    CNS: Avoid Sedatives. Continue home psych meds check qTC.    HEENT: Oral care    PULMONARY:  CT chest noted. HOB @ 45 degrees. O2 Sat > 92%. Nebs prn.    CARDIOVASCULAR: Repeat ECG now. Trend troponin. TTE MAP > 65. Continue home antihypertensives. Cardio eval     GI: GI prophylaxis.  Feeding.  Bowel regimen     RENAL:  Follow up lytes.  Correct as needed    INFECTIOUS DISEASE: Follow up cultures. Procal. Urine strep/legionella. s/p doxy azactam in ed. Continue doxycycline f/u nasal mrsa.     HEMATOLOGICAL:  DVT prophylaxis.    ENDOCRINE:  Patient with insulin pump will run out of insulin tomorrow. Endocrine. If runs out sooner initiate basal/bolus.    MUSCULOSKELETAL: Increase as Tolerated    Dispo; SDU

## 2025-01-22 NOTE — H&P ADULT - ATTENDING COMMENTS
Troponin elevated   No EKG changes   No chest pain   However diabetic   Echo TTE   Cardiology eval   Tele  Dispo per cardiology

## 2025-01-22 NOTE — ED PROVIDER NOTE - CARE PLAN
1 Principal Discharge DX:	Syncope  Secondary Diagnosis:	NSTEMI (non-ST elevation myocardial infarction)

## 2025-01-22 NOTE — H&P ADULT - NSHPLABSRESULTS_GEN_ALL_CORE
.  LABS:                         12.1   18.19 )-----------( 361      ( 22 Jan 2025 17:53 )             38.9     01-22    136  |  99  |  10  ----------------------------<  57[L]  3.8   |  25  |  0.6[L]    Ca    9.1      22 Jan 2025 17:53    TPro  7.9  /  Alb  4.1  /  TBili  <0.2  /  DBili  x   /  AST  43[H]  /  ALT  46[H]  /  AlkPhos  132[H]  01-22    PT/INR - ( 22 Jan 2025 17:53 )   PT: 12.90 sec;   INR: 1.09 ratio         PTT - ( 22 Jan 2025 17:53 )  PTT:31.2 sec  Urinalysis Basic - ( 22 Jan 2025 17:53 )    Color: x / Appearance: x / SG: x / pH: x  Gluc: 57 mg/dL / Ketone: x  / Bili: x / Urobili: x   Blood: x / Protein: x / Nitrite: x   Leuk Esterase: x / RBC: x / WBC x   Sq Epi: x / Non Sq Epi: x / Bacteria: x            Lactate, Blood: 1.7 mmol/L (01-22 @ 19:48)  Lactate, Blood: 2.3 mmol/L (01-22 @ 17:53)      RADIOLOGY, EKG & ADDITIONAL TESTS: Reviewed.

## 2025-01-22 NOTE — ED PROVIDER NOTE - ATTENDING APP SHARED VISIT CONTRIBUTION OF CARE
39 year old female with PMH of DM1 (insulin pump),depression, anxiety ,asthma, GERD, HTN and obesity BIBEMS due to syncope. pt states was at pharmacy to  her medications ( was diagnosed with bronchitis) and suddenly loss consciousness. denies sob chest pain, denies loss of urine or tongue biting. pt states has history of "pseudoseizure"  states unsure hit her head, no external sign and currently no FND   CONSTITUTIONAL: Well developed; in no acute distress  HEAD: Normocephalic; atraumatic  EYES: No conjunctival injection, no scleral icterus  ENT:No nasal discharge; airway clear.  NECK: Supple; non tender.  CARD: Warm and well perfused, not tachycardic  RESP: Breathing comfortably on RA, speaking in full sentences w/o distress  Abdomen: Soft, non-tender, non-distended   EXT: No gross deformities, normal ROM.  SKIN: Warm and dry  NEURO: Alert, oriented, motor and sensory grossly intact  PSYCH: Calm, cooperative  labs ecg cxr and reevaluate

## 2025-01-22 NOTE — H&P ADULT - NSHPPHYSICALEXAM_GEN_ALL_CORE
LOS:     VITALS:   T(C): 36.1 (01-22-25 @ 17:08), Max: 36.1 (01-22-25 @ 17:08)  HR: 77 (01-22-25 @ 19:49) (77 - 108)  BP: 121/77 (01-22-25 @ 19:49) (121/77 - 130/101)  RR: 19 (01-22-25 @ 19:49) (16 - 19)  SpO2: 97% (01-22-25 @ 19:49) (97% - 97%)    GENERAL: NAD, lying in bed comfortably  HEAD:  Atraumatic, Normocephalic  EYES: EOMI, PERRLA, conjunctiva and sclera clear  ENT: Moist mucous membranes  NECK: Supple, No JVD  CHEST/LUNG: Clear to auscultation bilaterally; No rales, rhonchi, wheezing, or rubs. Unlabored respirations  HEART: Regular rate and rhythm; No murmurs, rubs, or gallops  ABDOMEN: BSx4; Soft, nontender, nondistended  EXTREMITIES:  2+ Peripheral Pulses, brisk capillary refill. No clubbing, cyanosis, or edema  NERVOUS SYSTEM:  A&Ox3, no focal deficits   SKIN: No rashes or lesions

## 2025-01-23 ENCOUNTER — RESULT REVIEW (OUTPATIENT)
Age: 40
End: 2025-01-23

## 2025-01-23 LAB
ALBUMIN SERPL ELPH-MCNC: 3.8 G/DL — SIGNIFICANT CHANGE UP (ref 3.5–5.2)
ALBUMIN SERPL ELPH-MCNC: 3.9 G/DL — SIGNIFICANT CHANGE UP (ref 3.5–5.2)
ALP SERPL-CCNC: 109 U/L — SIGNIFICANT CHANGE UP (ref 30–115)
ALP SERPL-CCNC: 117 U/L — HIGH (ref 30–115)
ALT FLD-CCNC: 34 U/L — SIGNIFICANT CHANGE UP (ref 0–41)
ALT FLD-CCNC: 39 U/L — SIGNIFICANT CHANGE UP (ref 0–41)
ANION GAP SERPL CALC-SCNC: 13 MMOL/L — SIGNIFICANT CHANGE UP (ref 7–14)
ANION GAP SERPL CALC-SCNC: 14 MMOL/L — SIGNIFICANT CHANGE UP (ref 7–14)
APTT BLD: 30.8 SEC — SIGNIFICANT CHANGE UP (ref 27–39.2)
AST SERPL-CCNC: 19 U/L — SIGNIFICANT CHANGE UP (ref 0–41)
AST SERPL-CCNC: 29 U/L — SIGNIFICANT CHANGE UP (ref 0–41)
BASOPHILS # BLD AUTO: 0.02 K/UL — SIGNIFICANT CHANGE UP (ref 0–0.2)
BASOPHILS # BLD AUTO: 0.03 K/UL — SIGNIFICANT CHANGE UP (ref 0–0.2)
BASOPHILS NFR BLD AUTO: 0.2 % — SIGNIFICANT CHANGE UP (ref 0–1)
BASOPHILS NFR BLD AUTO: 0.3 % — SIGNIFICANT CHANGE UP (ref 0–1)
BILIRUB SERPL-MCNC: 0.2 MG/DL — SIGNIFICANT CHANGE UP (ref 0.2–1.2)
BILIRUB SERPL-MCNC: 0.3 MG/DL — SIGNIFICANT CHANGE UP (ref 0.2–1.2)
BUN SERPL-MCNC: 12 MG/DL — SIGNIFICANT CHANGE UP (ref 10–20)
BUN SERPL-MCNC: 6 MG/DL — LOW (ref 10–20)
CALCIUM SERPL-MCNC: 8.2 MG/DL — LOW (ref 8.4–10.5)
CALCIUM SERPL-MCNC: 8.7 MG/DL — SIGNIFICANT CHANGE UP (ref 8.4–10.5)
CHLORIDE SERPL-SCNC: 100 MMOL/L — SIGNIFICANT CHANGE UP (ref 98–110)
CHLORIDE SERPL-SCNC: 96 MMOL/L — LOW (ref 98–110)
CK MB CFR SERPL CALC: 1.4 NG/ML — SIGNIFICANT CHANGE UP (ref 0.6–6.3)
CK MB CFR SERPL CALC: 3.1 NG/ML — SIGNIFICANT CHANGE UP (ref 0.6–6.3)
CK SERPL-CCNC: 51 U/L — SIGNIFICANT CHANGE UP (ref 0–225)
CK SERPL-CCNC: 90 U/L — SIGNIFICANT CHANGE UP (ref 0–225)
CO2 SERPL-SCNC: 22 MMOL/L — SIGNIFICANT CHANGE UP (ref 17–32)
CO2 SERPL-SCNC: 24 MMOL/L — SIGNIFICANT CHANGE UP (ref 17–32)
CREAT SERPL-MCNC: 0.5 MG/DL — LOW (ref 0.7–1.5)
CREAT SERPL-MCNC: 0.6 MG/DL — LOW (ref 0.7–1.5)
EGFR: 117 ML/MIN/1.73M2 — SIGNIFICANT CHANGE UP
EGFR: 122 ML/MIN/1.73M2 — SIGNIFICANT CHANGE UP
EOSINOPHIL # BLD AUTO: 0.02 K/UL — SIGNIFICANT CHANGE UP (ref 0–0.7)
EOSINOPHIL # BLD AUTO: 0.04 K/UL — SIGNIFICANT CHANGE UP (ref 0–0.7)
EOSINOPHIL NFR BLD AUTO: 0.2 % — SIGNIFICANT CHANGE UP (ref 0–8)
EOSINOPHIL NFR BLD AUTO: 0.5 % — SIGNIFICANT CHANGE UP (ref 0–8)
GLUCOSE BLDC GLUCOMTR-MCNC: 159 MG/DL — HIGH (ref 70–99)
GLUCOSE BLDC GLUCOMTR-MCNC: 266 MG/DL — HIGH (ref 70–99)
GLUCOSE BLDC GLUCOMTR-MCNC: 275 MG/DL — HIGH (ref 70–99)
GLUCOSE BLDC GLUCOMTR-MCNC: 280 MG/DL — HIGH (ref 70–99)
GLUCOSE BLDC GLUCOMTR-MCNC: 312 MG/DL — HIGH (ref 70–99)
GLUCOSE BLDC GLUCOMTR-MCNC: 326 MG/DL — HIGH (ref 70–99)
GLUCOSE BLDC GLUCOMTR-MCNC: 329 MG/DL — HIGH (ref 70–99)
GLUCOSE BLDC GLUCOMTR-MCNC: 43 MG/DL — CRITICAL LOW (ref 70–99)
GLUCOSE SERPL-MCNC: 196 MG/DL — HIGH (ref 70–99)
GLUCOSE SERPL-MCNC: 262 MG/DL — HIGH (ref 70–99)
HCT VFR BLD CALC: 37 % — SIGNIFICANT CHANGE UP (ref 37–47)
HCT VFR BLD CALC: 37 % — SIGNIFICANT CHANGE UP (ref 37–47)
HGB BLD-MCNC: 11.1 G/DL — LOW (ref 12–16)
HGB BLD-MCNC: 11.2 G/DL — LOW (ref 12–16)
IMM GRANULOCYTES NFR BLD AUTO: 0.5 % — HIGH (ref 0.1–0.3)
IMM GRANULOCYTES NFR BLD AUTO: 0.6 % — HIGH (ref 0.1–0.3)
INR BLD: 1.13 RATIO — SIGNIFICANT CHANGE UP (ref 0.65–1.3)
INR BLD: 1.22 RATIO — SIGNIFICANT CHANGE UP (ref 0.65–1.3)
LACTATE SERPL-SCNC: 1.4 MMOL/L — SIGNIFICANT CHANGE UP (ref 0.7–2)
LYMPHOCYTES # BLD AUTO: 0.77 K/UL — LOW (ref 1.2–3.4)
LYMPHOCYTES # BLD AUTO: 1.66 K/UL — SIGNIFICANT CHANGE UP (ref 1.2–3.4)
LYMPHOCYTES # BLD AUTO: 18.7 % — LOW (ref 20.5–51.1)
LYMPHOCYTES # BLD AUTO: 8.5 % — LOW (ref 20.5–51.1)
MAGNESIUM SERPL-MCNC: 2 MG/DL — SIGNIFICANT CHANGE UP (ref 1.8–2.4)
MCHC RBC-ENTMCNC: 23.8 PG — LOW (ref 27–31)
MCHC RBC-ENTMCNC: 23.8 PG — LOW (ref 27–31)
MCHC RBC-ENTMCNC: 30 G/DL — LOW (ref 32–37)
MCHC RBC-ENTMCNC: 30.3 G/DL — LOW (ref 32–37)
MCV RBC AUTO: 78.6 FL — LOW (ref 81–99)
MCV RBC AUTO: 79.2 FL — LOW (ref 81–99)
MONOCYTES # BLD AUTO: 0.38 K/UL — SIGNIFICANT CHANGE UP (ref 0.1–0.6)
MONOCYTES # BLD AUTO: 0.68 K/UL — HIGH (ref 0.1–0.6)
MONOCYTES NFR BLD AUTO: 4.2 % — SIGNIFICANT CHANGE UP (ref 1.7–9.3)
MONOCYTES NFR BLD AUTO: 7.7 % — SIGNIFICANT CHANGE UP (ref 1.7–9.3)
MRSA PCR RESULT.: NEGATIVE — SIGNIFICANT CHANGE UP
NEUTROPHILS # BLD AUTO: 6.42 K/UL — SIGNIFICANT CHANGE UP (ref 1.4–6.5)
NEUTROPHILS # BLD AUTO: 7.82 K/UL — HIGH (ref 1.4–6.5)
NEUTROPHILS NFR BLD AUTO: 72.3 % — SIGNIFICANT CHANGE UP (ref 42.2–75.2)
NEUTROPHILS NFR BLD AUTO: 86.3 % — HIGH (ref 42.2–75.2)
NRBC # BLD: 0 /100 WBCS — SIGNIFICANT CHANGE UP (ref 0–0)
NRBC # BLD: 0 /100 WBCS — SIGNIFICANT CHANGE UP (ref 0–0)
NRBC BLD-RTO: 0 /100 WBCS — SIGNIFICANT CHANGE UP (ref 0–0)
NRBC BLD-RTO: 0 /100 WBCS — SIGNIFICANT CHANGE UP (ref 0–0)
PHOSPHATE SERPL-MCNC: 3.6 MG/DL — SIGNIFICANT CHANGE UP (ref 2.1–4.9)
PLATELET # BLD AUTO: 302 K/UL — SIGNIFICANT CHANGE UP (ref 130–400)
PLATELET # BLD AUTO: 318 K/UL — SIGNIFICANT CHANGE UP (ref 130–400)
PMV BLD: 10.3 FL — SIGNIFICANT CHANGE UP (ref 7.4–10.4)
PMV BLD: 10.5 FL — HIGH (ref 7.4–10.4)
POTASSIUM SERPL-MCNC: 3.7 MMOL/L — SIGNIFICANT CHANGE UP (ref 3.5–5)
POTASSIUM SERPL-MCNC: 4 MMOL/L — SIGNIFICANT CHANGE UP (ref 3.5–5)
POTASSIUM SERPL-SCNC: 3.7 MMOL/L — SIGNIFICANT CHANGE UP (ref 3.5–5)
POTASSIUM SERPL-SCNC: 4 MMOL/L — SIGNIFICANT CHANGE UP (ref 3.5–5)
PROCALCITONIN SERPL-MCNC: 0.32 NG/ML — HIGH (ref 0.02–0.1)
PROT SERPL-MCNC: 6.4 G/DL — SIGNIFICANT CHANGE UP (ref 6–8)
PROT SERPL-MCNC: 7.2 G/DL — SIGNIFICANT CHANGE UP (ref 6–8)
PROTHROM AB SERPL-ACNC: 13.4 SEC — HIGH (ref 9.95–12.87)
PROTHROM AB SERPL-ACNC: 14.5 SEC — HIGH (ref 9.95–12.87)
RBC # BLD: 4.67 M/UL — SIGNIFICANT CHANGE UP (ref 4.2–5.4)
RBC # BLD: 4.71 M/UL — SIGNIFICANT CHANGE UP (ref 4.2–5.4)
RBC # FLD: 24.3 % — HIGH (ref 11.5–14.5)
RBC # FLD: 24.5 % — HIGH (ref 11.5–14.5)
SODIUM SERPL-SCNC: 131 MMOL/L — LOW (ref 135–146)
SODIUM SERPL-SCNC: 138 MMOL/L — SIGNIFICANT CHANGE UP (ref 135–146)
TROPONIN T, HIGH SENSITIVITY RESULT: 43 NG/L — HIGH (ref 6–13)
TROPONIN T, HIGH SENSITIVITY RESULT: 45 NG/L — HIGH (ref 6–13)
TROPONIN T, HIGH SENSITIVITY RESULT: 54 NG/L — CRITICAL HIGH (ref 6–13)
TROPONIN T, HIGH SENSITIVITY RESULT: 85 NG/L — CRITICAL HIGH (ref 6–13)
WBC # BLD: 8.87 K/UL — SIGNIFICANT CHANGE UP (ref 4.8–10.8)
WBC # BLD: 9.06 K/UL — SIGNIFICANT CHANGE UP (ref 4.8–10.8)
WBC # FLD AUTO: 8.87 K/UL — SIGNIFICANT CHANGE UP (ref 4.8–10.8)
WBC # FLD AUTO: 9.06 K/UL — SIGNIFICANT CHANGE UP (ref 4.8–10.8)

## 2025-01-23 PROCEDURE — 70450 CT HEAD/BRAIN W/O DYE: CPT | Mod: 26,59

## 2025-01-23 PROCEDURE — 99253 IP/OBS CNSLTJ NEW/EST LOW 45: CPT

## 2025-01-23 PROCEDURE — 93010 ELECTROCARDIOGRAM REPORT: CPT | Mod: 76

## 2025-01-23 PROCEDURE — 71045 X-RAY EXAM CHEST 1 VIEW: CPT | Mod: 26

## 2025-01-23 PROCEDURE — 93307 TTE W/O DOPPLER COMPLETE: CPT | Mod: 26

## 2025-01-23 PROCEDURE — 70496 CT ANGIOGRAPHY HEAD: CPT | Mod: 26

## 2025-01-23 PROCEDURE — 70498 CT ANGIOGRAPHY NECK: CPT | Mod: 26

## 2025-01-23 PROCEDURE — 0042T: CPT

## 2025-01-23 PROCEDURE — 74018 RADEX ABDOMEN 1 VIEW: CPT | Mod: 26

## 2025-01-23 RX ORDER — DOXYCYCLINE HYCLATE 100 MG/1
100 CAPSULE ORAL EVERY 12 HOURS
Refills: 0 | Status: DISCONTINUED | OUTPATIENT
Start: 2025-01-23 | End: 2025-01-23

## 2025-01-23 RX ORDER — INSULIN LISPRO 100/ML
6 VIAL (ML) SUBCUTANEOUS ONCE
Refills: 0 | Status: COMPLETED | OUTPATIENT
Start: 2025-01-23 | End: 2025-01-23

## 2025-01-23 RX ORDER — DM/PSEUDOEPHED/ACETAMINOPHEN 10-30-250
50 CAPSULE ORAL ONCE
Refills: 0 | Status: DISCONTINUED | OUTPATIENT
Start: 2025-01-23 | End: 2025-01-23

## 2025-01-23 RX ORDER — GLUCAGON 3 MG/1
1 POWDER NASAL ONCE
Refills: 0 | Status: DISCONTINUED | OUTPATIENT
Start: 2025-01-23 | End: 2025-01-24

## 2025-01-23 RX ORDER — DM/PSEUDOEPHED/ACETAMINOPHEN 10-30-250
25 CAPSULE ORAL ONCE
Refills: 0 | Status: DISCONTINUED | OUTPATIENT
Start: 2025-01-23 | End: 2025-01-24

## 2025-01-23 RX ORDER — INSULIN LISPRO 100/ML
12 VIAL (ML) SUBCUTANEOUS
Refills: 0 | Status: DISCONTINUED | OUTPATIENT
Start: 2025-01-23 | End: 2025-01-24

## 2025-01-23 RX ORDER — INSULIN GLARGINE-YFGN 100 [IU]/ML
20 INJECTION, SOLUTION SUBCUTANEOUS EVERY MORNING
Refills: 0 | Status: DISCONTINUED | OUTPATIENT
Start: 2025-01-23 | End: 2025-01-24

## 2025-01-23 RX ORDER — INSULIN GLARGINE-YFGN 100 [IU]/ML
10 INJECTION, SOLUTION SUBCUTANEOUS ONCE
Refills: 0 | Status: COMPLETED | OUTPATIENT
Start: 2025-01-23 | End: 2025-01-23

## 2025-01-23 RX ORDER — SODIUM CHLORIDE 9 G/ML
1000 INJECTION, SOLUTION INTRAVENOUS
Refills: 0 | Status: DISCONTINUED | OUTPATIENT
Start: 2025-01-23 | End: 2025-01-24

## 2025-01-23 RX ORDER — ONDANSETRON 4 MG/1
4 TABLET, ORALLY DISINTEGRATING ORAL THREE TIMES A DAY
Refills: 0 | Status: DISCONTINUED | OUTPATIENT
Start: 2025-01-23 | End: 2025-01-24

## 2025-01-23 RX ORDER — ACETAMINOPHEN 160 MG/5ML
650 SUSPENSION ORAL EVERY 6 HOURS
Refills: 0 | Status: DISCONTINUED | OUTPATIENT
Start: 2025-01-23 | End: 2025-01-23

## 2025-01-23 RX ORDER — INSULIN GLARGINE-YFGN 100 [IU]/ML
10 INJECTION, SOLUTION SUBCUTANEOUS AT BEDTIME
Refills: 0 | Status: DISCONTINUED | OUTPATIENT
Start: 2025-01-23 | End: 2025-01-24

## 2025-01-23 RX ORDER — SACUBITRIL AND VALSARTAN 49; 51 MG/1; MG/1
1 TABLET, FILM COATED ORAL
Refills: 0 | Status: DISCONTINUED | OUTPATIENT
Start: 2025-01-23 | End: 2025-01-23

## 2025-01-23 RX ORDER — ACETAMINOPHEN 160 MG/5ML
650 SUSPENSION ORAL EVERY 6 HOURS
Refills: 0 | Status: DISCONTINUED | OUTPATIENT
Start: 2025-01-23 | End: 2025-01-24

## 2025-01-23 RX ORDER — ANTISEPTIC SURGICAL SCRUB 0.04 MG/ML
1 SOLUTION TOPICAL DAILY
Refills: 0 | Status: DISCONTINUED | OUTPATIENT
Start: 2025-01-23 | End: 2025-01-24

## 2025-01-23 RX ORDER — ASPIRIN 81 MG/1
81 TABLET, COATED ORAL DAILY
Refills: 0 | Status: DISCONTINUED | OUTPATIENT
Start: 2025-01-23 | End: 2025-01-24

## 2025-01-23 RX ORDER — INSULIN LISPRO 100/ML
VIAL (ML) SUBCUTANEOUS
Refills: 0 | Status: DISCONTINUED | OUTPATIENT
Start: 2025-01-23 | End: 2025-01-24

## 2025-01-23 RX ORDER — DM/PSEUDOEPHED/ACETAMINOPHEN 10-30-250
12.5 CAPSULE ORAL ONCE
Refills: 0 | Status: DISCONTINUED | OUTPATIENT
Start: 2025-01-23 | End: 2025-01-24

## 2025-01-23 RX ORDER — DM/PSEUDOEPHED/ACETAMINOPHEN 10-30-250
15 CAPSULE ORAL ONCE
Refills: 0 | Status: DISCONTINUED | OUTPATIENT
Start: 2025-01-23 | End: 2025-01-24

## 2025-01-23 RX ORDER — ATORVASTATIN CALCIUM 80 MG/1
40 TABLET, FILM COATED ORAL AT BEDTIME
Refills: 0 | Status: DISCONTINUED | OUTPATIENT
Start: 2025-01-23 | End: 2025-01-24

## 2025-01-23 RX ADMIN — ATORVASTATIN CALCIUM 40 MILLIGRAM(S): 80 TABLET, FILM COATED ORAL at 23:28

## 2025-01-23 RX ADMIN — Medication 8: at 07:48

## 2025-01-23 RX ADMIN — Medication 12.5 MILLIGRAM(S): at 13:23

## 2025-01-23 RX ADMIN — INSULIN GLARGINE-YFGN 10 UNIT(S): 100 INJECTION, SOLUTION SUBCUTANEOUS at 15:58

## 2025-01-23 RX ADMIN — ANTISEPTIC SURGICAL SCRUB 1 APPLICATION(S): 0.04 SOLUTION TOPICAL at 13:26

## 2025-01-23 RX ADMIN — Medication 100 MILLIGRAM(S): at 11:22

## 2025-01-23 RX ADMIN — Medication 25 MILLIGRAM(S): at 13:24

## 2025-01-23 RX ADMIN — ACETAMINOPHEN 650 MILLIGRAM(S): 160 SUSPENSION ORAL at 12:20

## 2025-01-23 RX ADMIN — Medication 25 MILLIGRAM(S): at 06:33

## 2025-01-23 RX ADMIN — Medication 25 MILLIGRAM(S): at 23:28

## 2025-01-23 RX ADMIN — DOXYCYCLINE HYCLATE 100 MILLIGRAM(S): 100 CAPSULE ORAL at 07:46

## 2025-01-23 RX ADMIN — Medication 6: at 16:47

## 2025-01-23 RX ADMIN — Medication 6 UNIT(S): at 23:29

## 2025-01-23 RX ADMIN — INSULIN GLARGINE-YFGN 10 UNIT(S): 100 INJECTION, SOLUTION SUBCUTANEOUS at 23:29

## 2025-01-23 RX ADMIN — ONDANSETRON 4 MILLIGRAM(S): 4 TABLET, ORALLY DISINTEGRATING ORAL at 14:35

## 2025-01-23 RX ADMIN — ONDANSETRON 4 MILLIGRAM(S): 4 TABLET, ORALLY DISINTEGRATING ORAL at 08:04

## 2025-01-23 RX ADMIN — Medication 6: at 11:21

## 2025-01-23 RX ADMIN — ACETAMINOPHEN 650 MILLIGRAM(S): 160 SUSPENSION ORAL at 11:37

## 2025-01-23 RX ADMIN — Medication 5 MILLIGRAM(S): at 06:33

## 2025-01-23 NOTE — PROGRESS NOTE ADULT - ASSESSMENT
38 yo female admitted for evaluation of syncope    #Syncope   - HD stable, +LOC, no memory of events   - Hx of similar in past. Told sec to anxiety   - Echo wnl, No EKG changes   - Pending cardiology eval - Follows with Tamburrino   - f/u orthostatics     #Troponemia  - Trop downtrending, No Chest pain   - No EKG changes     #Diabetes type 1  - Isulin glargine 20 units stat now, then 20 units every AM,  INSULIN GLARGINE 10 UNITS AT BEDTIME, LISPRO 12 UNITS BEFORE 3 MEALS.       .   40 yo female admitted for evaluation of syncope    #Syncope   - HD stable, +LOC, no memory of events   - Hx of similar in past. Told sec to anxiety   - Echo wnl, No EKG changes   - Pending cardiology eval - Follows with Tamburrino   - f/u orthostatics, EEG     #Troponemia  - Trop downtrending, No Chest pain   - No EKG changes     #Diabetes type 1  - Seen by endocrine  - Insulin glargine 20 Units at bedtime, 10 Units in AM, Lispro 12 Units before meals + ISS              .

## 2025-01-23 NOTE — CHART NOTE - NSCHARTNOTEFT_GEN_A_CORE
Patient was seen over televideo on 1/23/2025 at 8:16 PM   HISTORY OF PRESENT ILLNESS:  Patient is a 40 y/o F with a PMHx of pseudoseizures, type 1 diabetes, who presented to the ED yesterday due to an episode of syncope and was admitted for workup. Today, patient was at her baseline at around 6-6:30 pm. She had a sudden onset of AMS and a stroke code was called at 7 pm. Patient had an initial NIHSS of 30.     PAST MEDICAL & SURGICAL HISTORY:  Neuropathy  right lower extremity, vaginal      Type 1 diabetes      Degenerative disc disease, thoracic      Urinary tract infection, recurrent      Gastroesophageal reflux disease, esophagitis presence not specified      Intractable headache, unspecified chronicity pattern, unspecified headache type      Tremor of right hand      Tachycardia      Spinal stenosis      No significant past surgical history      OUTSIDE HOSPITAL COURSE:      HOME MEDICATIONS:  Home Medications:  insulin regular: 2.45 unit(s) subcutaneous every hour As directed use for continous insulin pump (06 Jan 2025 10:52)  methocarbamol 500 mg oral tablet: 1 tab(s) orally every 8 hours As needed Muscle Spasm (30 Dec 2024 16:08)  metoprolol tartrate 25 mg oral tablet: 1 tab(s) orally 3 times a day (10 Dec 2024 09:54)  naproxen 500 mg oral tablet: 1 tab(s) orally every 12 hours As needed pain (10 Dec 2024 09:54)  Norvasc 5 mg oral tablet: 1 tab(s) orally once a day (07 Nov 2024 22:03)  omeprazole 40 mg oral delayed release capsule: 1 cap(s) orally once a day (04 Dec 2024 09:08)        SMOKING/ETOH/RECREATIONAL DRUGS:      VITALS/DATA/ORDERS: [x] Reviewed    CT Head:  No acute territorial infarct, intracranial hemorrhage, or midline shift.   Further evaluation with MRI can be considered if the symptoms persist (if   no contraindication).      CT PERFUSION:    No perfusion deficits to suggest areas of completed infarction or at risk   territory.    The cerebral blood volume and time to peak images demonstrate no   significant evidence of fixed or mismatched focal perfusion deficit or   surrounding ischemic penumbra to suggest acute cerebral ischemia or   infarct.      CTA HEAD/NECK:    The examination is degraded by mild motion artifact.    AORTIC ARCH: Normal.    RIGHT ANTERIOR CIRCULATION:  Common carotid artery: No stenosis.  External carotid artery: No stenosis.  Internal carotid artery:  -Extracranial: No stenosis.  -Intracranial: No stenosis.    Anterior cerebral artery: No stenosis.  Middle cerebral artery: No stenosis.      LEFT ANTERIOR CIRCULATION:  Common carotid artery: No stenosis.  External carotid artery: No stenosis.  Internal carotid artery:  -Extracranial: No stenosis.  -Intracranial: No stenosis.    Anterior cerebral artery: No stenosis.  Middle cerebral artery: No stenosis.      POSTERIOR CIRCULATION:  Right vertebral artery: No stenosis.  Left vertebral artery: No stenosis.  Basilar artery: No stenosis.  Proximal cerebellar arteries: No stenosis.    Posterior cerebral arteries: No stenosis.      Dural venous sinuses or deep cerebral veins:  No evidence of dural sinus thrombosis.    NONVASCULAR FINDINGS:  Please see separately dictated CT head for the intracranial findings.  Enlarged adenoid tissue.  Questionable mild enhancing prominence left retropharyngeal lymph node   measuring approximately 0.9 cm.  Several mildly enlarged left level 2B lymph nodes measuring up to 1.3 cm.      IMPRESSION:    CT PERFUSION:  No perfusion deficits to suggest areas of completed infarction or at risk   territory.    CTA HEAD/NECK:  No large vessel occlusion, stenosis, aneurysm, or vascular malformation.    Incidentally noted enlarged adenoid with mildly enlarged left   retropharyngeal lymph node and left level IIb lymph nodes. Recommend   follow-up with ENT for direct visual inspection.    Labs:  CAPILLARY BLOOD GLUCOSE  329 (23 Jan 2025 19:25)      POCT Blood Glucose.: 329 mg/dL (23 Jan 2025 18:16)    Platelet Count - Automated: 302 K/uL (01-23-25 @ 20:05)  Platelet Count - Automated: 318 K/uL (01-23-25 @ 05:20)    PT/INR - ( 23 Jan 2025 20:05 )   PT: 14.50 sec;   INR: 1.22 ratio         PTT - ( 23 Jan 2025 20:05 )  PTT:30.8 sec    EXAMINATION: Assisted by TORY Mccray    NIH Stroke Scale    1A: Level of consciousness —> 2 = Requires repeated stimulation to arouse  1B: Ask month and age —> 0 = Both questions right  1C: 'Blink eyes' & 'squeeze hands' —> 2 = Performs 0 tasks  2: Horizontal extraocular movements —> 0 = Normal  3: Visual fields —> 0 = No visual loss  4: Facial palsy —> 0 = Normal symmetry  5A: Left arm motor drift —> 0 = No drift for 10 seconds  5B: Right arm motor drift —> 0 = No drift for 10 seconds  6A: Left leg motor drift —> 0 = No drift for 5 seconds  6B: Right leg motor drift —> 0 = No drift for 5 seconds  7: Limb Ataxia —> 0 = No ataxia  8: Sensation —> 0 = Normal; no sensory loss  9: Language/aphasia —> 3 = Mute/global aphasia: no usable speech/auditory comprehension  10: Dysarthria —> 2 = Mute/anarthric  11: Extinction/inattention —> 0 = No abnormality    NIHSS of 9      NEUROLOGIC EXAMINATION   Patient keeps eyes closed throughout the exam. No verbal output. Does not follow any commands. Initially appeared to have a left gaze deviation when eyelids were lifted, however upon repeat testing, patient is able to look fully in both directions.  Blinks to threat b/l. No facial weakness. Able to hold both arms up above head when they are lifted for her. No drift. Able to hold both legs antigravity when they are lifted for her.     IV Alteplase CONTRAINDICATIONS  [] None    ABSOLUTE EXCLUSION CRITERIA:  [] Patient outside of the appropriate time window for IV alteplase  [] Evidence of intracranial hemorrhage on pretreatment CT scan (CT must be read by an attending radiologist or attending neurologist)  [] Head CT findings suggesting an established acute cerebral infarction as evidenced by dorys hypodensity, regardless of size.  [] Clinical presentation consistent with subarachnoid hemorrhage, even with normal CT scan  [] Active internal bleeding  [] Known bleeding diathesis (including but not limited to platelet count < 100,000, use of oral anticoagulants with INR>1.7, use of full dose low molecular       weight heparin within the last 24 hours, use of unfractionated heparin AND a prolonged PTT (> 40sec), use of direct thrombin inhibitor (e.g. dabigatran) or oral direct Factor Xa inhibitor (e.g. rivaroxiban, apixaban) within 48 hours)  [] Systolic pressure >= 185 mmHg or diastolic pressure 110 mmHg on repeated measurements at the time treatment is to begin  [] Care team unable to determine eligibility for IV alteplase  [] Patient, family, or surrogate declines and understands risks and benefits of treatment.  [] For wake-up Protocol patients: DW-MRI lesions larger than one-third of the MCA territory      RELATIVE EXCLUSION CRITERIA    [] Isolated neurological deficits (except for aphasia or hemianopsia)  [x] stroke severity too mild (non-disabling)  [] Seizure at onset with post-ictal residual neurological impairment  [] Gastrointestinal, genitourinary or respiratory hemorrhage within 21 days   [] Major surgery within 14 days   [] Blood glucose level < 50 or > 400 mG/dL  [] CPR with chest compressions or minor surgery (including liver and kidney biopsy, thoracocentesis, lumbar puncture) within 10 days   [] Arterial puncture at non-compressible site within 7 days   [] Evidence of acute trauma (fracture)   [] Diabetic hemorrhagic retinopathy or ophthalmic bleeding  [] Pregnancy or peripartum; no nursing post- treatment  [] Post-myocardial infarction pericarditis  [] Peritoneal dialysis or hemodialysis or sever hepatic disease   [] Known bacterial endocarditis   [] Life expectancy less than 6 months or sever co-morbid illness   [] Known cerebral vascular malformation, untreated intracranial aneurysm or brain tumor (may consider IV alteplase in patients with CNS lesions that have a very low likelihood of hemorrhage, such as small un-ruptured aneurysms or benign tumors with low vascularity.  [] Social/Anabaptism reason  [] Other ____________________      ASSESSMENT  Patient is a 40 y/o F with a PMHx of pseudoseizures, type 1 diabetes, who presented to the ED yesterday due to an episode of syncope and was admitted for workup. Today, patient was at her baseline at around 6-6:30 pm, when she suddenly had a sudden onset of AMS. Patient had an initial NIHSS of 30. On exam over televideo, patient has an NIHSS of 9. She is mute and keeps her eyes closed throughout the exam. She is able to hold both arms and legs antigravity when they are lifted for her and does not have a gaze deviation. CTH read no acute infract. CTA read no LVO. CTP read no perfusion deficits. Patient is not a current TNK candidate as her symptoms are not consistent with a stroke.  Etiology of symptoms may be 2/2 to seizures or possible catatonia. Patient is not a current neuro IR thrombectomy candidate as there is no LVO on vessel imaging.        RECOMMENDATIONS:  [] consider seizure workup      Plan discussed with Telestroke attending Dr. Chano Carmona        Consultation provided via live, two-way video streaming. Consultation provided in patient's preferred language.

## 2025-01-23 NOTE — CONSULT NOTE ADULT - SUBJECTIVE AND OBJECTIVE BOX
CARDIOLOGY CONSULT NOTE     CHIEF COMPLAINT/REASON FOR CONSULT:    HPI:  38 yo female presents to ed for evaluation of syncope. patient states she went to urgent care and was diagnosed with bronchitis. patient then went foot shopping and had a syncopal episode. patient does not remember what happened.  patient denies shortness of breath or chest pain. patient states she has body aches. Of note patient was recently ill with RSV and norovirus now resolved,  In the ED /101 hr 108 rr 16 afebrile saturating 97% on ra   [facility] WBC 18 HST 66 repeat 131 ast 43 alt short rvp negative  Urinalysis Basic - ( 22 Jan 2025 17:53 )    Color: x / Appearance: x / SG: x / pH: x  Gluc: 57 mg/dL / Ketone: x  / Bili: x / Urobili: x   Blood: x / Protein: x / Nitrite: x     Leuk Esterase: x / RBC: x / WBC x   Sq Epi: x / Non Sq Epi: x / Bacteria: x      CT angio chest PE protocol negative for pe or acute thoracic pathology.       (22 Jan 2025 22:34)      PAST MEDICAL & SURGICAL HISTORY:  Neuropathy  right lower extremity, vaginal      Type 1 diabetes      Degenerative disc disease, thoracic      Urinary tract infection, recurrent      Gastroesophageal reflux disease, esophagitis presence not specified      Intractable headache, unspecified chronicity pattern, unspecified headache type      Tremor of right hand      Tachycardia      Spinal stenosis      No significant past surgical history          Cardiac Risks:   [x ]HTN, [x ] DM, [ ] Smoking, [ ] FH,  [ ] Lipids        MEDICATIONS:  MEDICATIONS  (STANDING):  amLODIPine   Tablet 5 milliGRAM(s) Oral daily  chlorhexidine 2% Cloths 1 Application(s) Topical daily  dextrose 5%. 1000 milliLiter(s) (100 mL/Hr) IV Continuous <Continuous>  dextrose 5%. 1000 milliLiter(s) (50 mL/Hr) IV Continuous <Continuous>  dextrose 50% Injectable 25 Gram(s) IV Push once  dextrose 50% Injectable 12.5 Gram(s) IV Push once  dextrose 50% Injectable 25 Gram(s) IV Push once  doxycycline IVPB 100 milliGRAM(s) IV Intermittent every 12 hours  glucagon  Injectable 1 milliGRAM(s) IntraMuscular once  insulin lispro (ADMELOG) corrective regimen sliding scale   SubCutaneous three times a day before meals  metoprolol tartrate 25 milliGRAM(s) Oral three times a day  sertraline 100 milliGRAM(s) Oral daily      FAMILY HISTORY:      SOCIAL HISTORY:        Allergies    Clindamycin Phosphate (Unknown)  amoxicillin (Hives)  penicillins (Hives)  clindamycin (Hives; Nephrotoxicity)  Ceclor (Rash)  Fluad 0.5 ML ODETTE (Unknown)    Intolerances    gabapentin (Other)  Cipro (Other)  Influenza Virus Vaccine, H5N1, Inactivated (Other)  	    REVIEW OF SYSTEMS:  CONSTITUTIONAL: No fever, weight loss, or fatigue  EYES: No eye pain, visual disturbances, or discharge  ENMT:  No difficulty hearing, tinnitus, vertigo; No sinus or throat pain  NECK: No pain or stiffness  RESPIRATORY: No cough, wheezing, chills or hemoptysis; No Shortness of Breath  CARDIOVASCULAR: See above  GASTROINTESTINAL: No abdominal or epigastric pain. No nausea, vomiting, or hematemesis; No diarrhea or constipation. No melena or hematochezia.  GENITOURINARY: No dysuria, frequency, hematuria, or incontinence  NEUROLOGICAL: No headaches, memory loss, loss of strength, numbness, or tremors  SKIN: No itching, burning, rashes, or lesions   	        PHYSICAL EXAM:  T(C): 36.6 (01-23-25 @ 07:01), Max: 37 (01-23-25 @ 00:32)  HR: 102 (01-23-25 @ 06:32) (77 - 108)  BP: 131/60 (01-23-25 @ 06:32) (121/77 - 143/72)  RR: 18 (01-23-25 @ 07:01) (16 - 29)  SpO2: 97% (01-23-25 @ 06:32) (94% - 99%)  Wt(kg): --  I&O's Summary    22 Jan 2025 07:01  -  23 Jan 2025 07:00  --------------------------------------------------------  IN: 0 mL / OUT: 700 mL / NET: -700 mL        Appearance: Normal	  Psychiatry: A & O x 3, Mood & affect appropriate  HEENT:   Normal oral mucosa, PERRL, EOMI	  Lymphatic: No lymphadenopathy  Cardiovascular: Normal S1 S2,RRR, No JVD, No murmurs  Respiratory: Lungs clear to auscultation	  Gastrointestinal:  Soft, Non-tender, + BS	  Skin: No rashes, No ecchymoses, No cyanosis	  Neurologic: Non-focal  Extremities: Normal range of motion, No clubbing, cyanosis or edema  Vascular: Peripheral pulses palpable 2+ bilaterally      ECG:  	< from: 12 Lead ECG (01.22.25 @ 21:18) >    Diagnosis Line Normal sinus rhythm  Rightward axis        Confirmed by ROSARIO TRISTAN MD ([id number]) on 1/23/2025 6:58:25 AM    < end of copied text >      	  LABS:	 	    CARDIAC MARKERS:                                    11.2   8.87  )-----------( 318      ( 23 Jan 2025 05:20 )             37.0     01-23    138  |  100  |  6[L]  ----------------------------<  196[H]  3.7   |  24  |  0.5[L]    Ca    8.7      23 Jan 2025 05:20  Phos  3.6     01-23  Mg     2.0     01-23    TPro  7.2  /  Alb  3.9  /  TBili  0.2  /  DBili  x   /  AST  29  /  ALT  39  /  AlkPhos  117[H]  01-23    PT/INR - ( 23 Jan 2025 05:20 )   PT: 13.40 sec;   INR: 1.13 ratio         PTT - ( 22 Jan 2025 17:53 )  PTT:31.2 sec

## 2025-01-23 NOTE — PROGRESS NOTE ADULT - SUBJECTIVE AND OBJECTIVE BOX
O/N Events:    Subjective/ROS: Patient seen and examined at bedside.     Denies Fever/Chills, HA, CP, SOB, n/v, changes in bowel/urinary habits.  12pt ROS otherwise negative.    VITALS  Vital Signs Last 24 Hrs  T(C): 36.2 (23 Jan 2025 11:00), Max: 37 (23 Jan 2025 00:32)  T(F): 97.2 (23 Jan 2025 11:00), Max: 98.6 (23 Jan 2025 00:32)  HR: 102 (23 Jan 2025 06:32) (77 - 108)  BP: 131/60 (23 Jan 2025 06:32) (121/77 - 143/72)  BP(mean): 87 (23 Jan 2025 06:32) (87 - 101)  RR: 18 (23 Jan 2025 11:00) (16 - 29)  SpO2: 98% (23 Jan 2025 08:00) (94% - 99%)    Parameters below as of 23 Jan 2025 08:00  Patient On (Oxygen Delivery Method): room air      CAPILLARY BLOOD GLUCOSE      POCT Blood Glucose.: 266 mg/dL (23 Jan 2025 11:20)  POCT Blood Glucose.: 280 mg/dL (23 Jan 2025 10:34)  POCT Blood Glucose.: 326 mg/dL (23 Jan 2025 07:39)  POCT Blood Glucose.: 159 mg/dL (23 Jan 2025 04:32)  POCT Blood Glucose.: 43 mg/dL (23 Jan 2025 03:08)  POCT Blood Glucose.: 132 mg/dL (22 Jan 2025 23:21)  POCT Blood Glucose.: 95 mg/dL (22 Jan 2025 19:43)  POCT Blood Glucose.: 96 mg/dL (22 Jan 2025 18:51)  POCT Blood Glucose.: 55 mg/dL (22 Jan 2025 18:19)      PHYSICAL EXAM  General: NAD  Head: NC/AT; MMM; PERRL; EOMI;  Neck: Supple; no JVD  Respiratory: CTAB; no wheezes/rales/rhonchi  Cardiovascular: Regular rhythm/rate; S1/S2+, no murmurs, rubs gallops   Gastrointestinal: Soft; NTND; bowel sounds normal and present  Extremities: WWP; no edema/cyanosis  Neurological: A&Ox3, CNII-XII grossly intact; no obvious focal deficits    MEDICATIONS  (STANDING):  amLODIPine   Tablet 5 milliGRAM(s) Oral daily  chlorhexidine 2% Cloths 1 Application(s) Topical daily  dextrose 5%. 1000 milliLiter(s) (100 mL/Hr) IV Continuous <Continuous>  dextrose 5%. 1000 milliLiter(s) (50 mL/Hr) IV Continuous <Continuous>  dextrose 50% Injectable 25 Gram(s) IV Push once  dextrose 50% Injectable 12.5 Gram(s) IV Push once  dextrose 50% Injectable 25 Gram(s) IV Push once  glucagon  Injectable 1 milliGRAM(s) IntraMuscular once  insulin lispro (ADMELOG) corrective regimen sliding scale   SubCutaneous three times a day before meals  metoprolol tartrate 25 milliGRAM(s) Oral three times a day  sacubitril 24 mG/valsartan 26 mG 1 Tablet(s) Oral two times a day  sertraline 100 milliGRAM(s) Oral daily  spironolactone 12.5 milliGRAM(s) Oral daily    MEDICATIONS  (PRN):  acetaminophen     Tablet .. 650 milliGRAM(s) Oral every 6 hours PRN Moderate Pain (4 - 6)  dextrose Oral Gel 15 Gram(s) Oral once PRN Blood Glucose LESS THAN 70 milliGRAM(s)/deciliter  ondansetron Injectable 4 milliGRAM(s) IV Push three times a day PRN Nausea and/or Vomiting      Clindamycin Phosphate (Unknown)  amoxicillin (Hives)  penicillins (Hives)  gabapentin (Other)  clindamycin (Hives; Nephrotoxicity)  Ceclor (Rash)  Cipro (Other)  Influenza Virus Vaccine, H5N1, Inactivated (Other)  Fluad 0.5 ML ODETTE (Unknown)      LABS                        11.2   8.87  )-----------( 318      ( 23 Jan 2025 05:20 )             37.0     01-23    138  |  100  |  6[L]  ----------------------------<  196[H]  3.7   |  24  |  0.5[L]    Ca    8.7      23 Jan 2025 05:20  Phos  3.6     01-23  Mg     2.0     01-23    TPro  7.2  /  Alb  3.9  /  TBili  0.2  /  DBili  x   /  AST  29  /  ALT  39  /  AlkPhos  117[H]  01-23    PT/INR - ( 23 Jan 2025 05:20 )   PT: 13.40 sec;   INR: 1.13 ratio         PTT - ( 22 Jan 2025 17:53 )  PTT:31.2 sec  Urinalysis Basic - ( 23 Jan 2025 05:20 )    Color: x / Appearance: x / SG: x / pH: x  Gluc: 196 mg/dL / Ketone: x  / Bili: x / Urobili: x   Blood: x / Protein: x / Nitrite: x   Leuk Esterase: x / RBC: x / WBC x   Sq Epi: x / Non Sq Epi: x / Bacteria: x      CARDIAC MARKERS ( 23 Jan 2025 11:52 )  x     / x     / x     / x     / 3.1 ng/mL          IMAGING/EKG/ETC

## 2025-01-23 NOTE — CONSULT NOTE ADULT - ASSESSMENT
40 yo female presents to ed for evaluation of syncope. patient states she went to urgent care and was diagnosed with bronchitis. patient then went foot shopping and had a syncopal episode. patient does not remember what happened.. Patient has NDM since age 6mos. Syncope in past after NM stress. Chest wall now tender. No PE. Check ortho bp. Check cpk ,echo. Told in past she needs cardiac cath. Will discuss with Dr Dhillon possible cardiac cath. 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 Out patient sleep study

## 2025-01-23 NOTE — PATIENT PROFILE ADULT - FALL HARM RISK - HARM RISK INTERVENTIONS

## 2025-01-23 NOTE — CONSULT NOTE ADULT - ASSESSMENT
type 1 diabetes, insulin glargine 20 units stat now, then 20 units every AM, INSULIN GLARGINE 10 UNITS AT BEDTIME, LISPRO 12 UNITS BEFORE 3 MEALS.

## 2025-01-23 NOTE — CONSULT NOTE ADULT - SUBJECTIVE AND OBJECTIVE BOX
Reason for Endocrinology Consult: Diabetes    HPI: 39y Female      Neuroglycopenia within past year?    Outpatient Endocrinologist?    PAST MEDICAL & SURGICAL HISTORY:  Neuropathy  right lower extremity, vaginal      Type 1 diabetes      Degenerative disc disease, thoracic      Urinary tract infection, recurrent      Gastroesophageal reflux disease, esophagitis presence not specified      Intractable headache, unspecified chronicity pattern, unspecified headache type      Tremor of right hand      Tachycardia      Spinal stenosis      No significant past surgical history        FAMILY HISTORY:      SH:  Smoking  Etoh:  Recreational Drugs:    Home Medications:  insulin regular: 2.45 unit(s) subcutaneous every hour As directed use for continous insulin pump (06 Jan 2025 10:52)  methocarbamol 500 mg oral tablet: 1 tab(s) orally every 8 hours As needed Muscle Spasm (30 Dec 2024 16:08)  metoprolol tartrate 25 mg oral tablet: 1 tab(s) orally 3 times a day (10 Dec 2024 09:54)  naproxen 500 mg oral tablet: 1 tab(s) orally every 12 hours As needed pain (10 Dec 2024 09:54)  Norvasc 5 mg oral tablet: 1 tab(s) orally once a day (07 Nov 2024 22:03)  omeprazole 40 mg oral delayed release capsule: 1 cap(s) orally once a day (04 Dec 2024 09:08)      Current (Non-Endocrine) Meds:  acetaminophen     Tablet .. 650 milliGRAM(s) Oral every 6 hours PRN  amLODIPine   Tablet 5 milliGRAM(s) Oral daily  chlorhexidine 2% Cloths 1 Application(s) Topical daily  dextrose 5%. 1000 milliLiter(s) IV Continuous <Continuous>  dextrose 5%. 1000 milliLiter(s) IV Continuous <Continuous>  metoprolol tartrate 25 milliGRAM(s) Oral three times a day  ondansetron Injectable 4 milliGRAM(s) IV Push three times a day PRN  sacubitril 24 mG/valsartan 26 mG 1 Tablet(s) Oral two times a day  sertraline 100 milliGRAM(s) Oral daily  spironolactone 12.5 milliGRAM(s) Oral daily      Current Endocrine Meds:   dextrose 50% Injectable 25 Gram(s) IV Push once  dextrose 50% Injectable 12.5 Gram(s) IV Push once  dextrose 50% Injectable 25 Gram(s) IV Push once  dextrose Oral Gel 15 Gram(s) Oral once PRN  glucagon  Injectable 1 milliGRAM(s) IntraMuscular once  insulin lispro (ADMELOG) corrective regimen sliding scale   SubCutaneous three times a day before meals      Allergies:  Clindamycin Phosphate (Unknown)  amoxicillin (Hives)  penicillins (Hives)  gabapentin (Other)  clindamycin (Hives; Nephrotoxicity)  Ceclor (Rash)  Cipro (Other)  Influenza Virus Vaccine, H5N1, Inactivated (Other)  Fluad 0.5 ML ODETTE (Unknown)      ROS:  Denies the following except as indicated.    General: weight loss/weight gain, decreased appetite, fatigue, fever  Eyes: blurry vision, double vision  ENT: neck swelling, dysphagia, voice changes   CV: palpitations, SOB, chest pain, cough  GI: nausea, vomiting, diarrhea, constipation, abdominal pain  : nocturia,  polyuria, dysuria  Endo: decreased libido, heat/cold intolerance, jitteriness  MSK: arthralgias, myalgias  Skin: rash, dryness, diaphoresis  Neuro: pedal numbness,pedal paresthesias, pedal pain    Height (cm): 162.6 (01-23 @ 00:32)  Weight (kg): 84.6 (01-23 @ 00:32)  BMI (kg/m2): 32 (01-23 @ 00:32)    Vital Signs Last 24 Hrs  T(C): 36.2 (23 Jan 2025 11:00), Max: 37 (23 Jan 2025 00:32)  T(F): 97.2 (23 Jan 2025 11:00), Max: 98.6 (23 Jan 2025 00:32)  HR: 102 (23 Jan 2025 06:32) (77 - 108)  BP: 131/60 (23 Jan 2025 06:32) (121/77 - 143/72)  BP(mean): 87 (23 Jan 2025 06:32) (87 - 101)  RR: 18 (23 Jan 2025 11:00) (16 - 29)  SpO2: 98% (23 Jan 2025 08:00) (94% - 99%)    Parameters below as of 23 Jan 2025 08:00  Patient On (Oxygen Delivery Method): room air      Constitutional: WN/WD in NAD.   Neck: no thyromegaly or palpable thyroid nodules   Respiratory: lungs CTAB.  Cardiovascular: regular rate and rhythm, normal S1 and S2, no audible murmurs  GI: soft, NT/ND, no masses/HSM appreciated.  Ext: no edema, no ulcers, pedal pulses palpable bilaterally  Neurology: no tremor, monofilament sensation intact in feet  Psychiatric: A&O x 3, normal affect/mood.        LABS:                        11.2   8.87  )-----------( 318      ( 23 Jan 2025 05:20 )             37.0     01-23    138  |  100  |  6[L]  ----------------------------<  196[H]  3.7   |  24  |  0.5[L]    Ca    8.7      23 Jan 2025 05:20  Phos  3.6     01-23  Mg     2.0     01-23    TPro  7.2  /  Alb  3.9  /  TBili  0.2  /  DBili  x   /  AST  29  /  ALT  39  /  AlkPhos  117[H]  01-23    PT/INR - ( 23 Jan 2025 05:20 )   PT: 13.40 sec;   INR: 1.13 ratio         PTT - ( 22 Jan 2025 17:53 )  PTT:31.2 sec  Urinalysis Basic - ( 23 Jan 2025 05:20 )    Color: x / Appearance: x / SG: x / pH: x  Gluc: 196 mg/dL / Ketone: x  / Bili: x / Urobili: x   Blood: x / Protein: x / Nitrite: x   Leuk Esterase: x / RBC: x / WBC x   Sq Epi: x / Non Sq Epi: x / Bacteria: x                             Thyroid Stimulating Hormone, Serum: 0.66 (12-23 @ 08:53)          RADIOLOGY & ADDITIONAL STUDIES:    A/P:39yFemale    Above discussed with resident.

## 2025-01-23 NOTE — PHARMACOTHERAPY INTERVENTION NOTE - COMMENTS
Patient on doxycycline 100mg q12h- Recommend to consider discontinuation in setting of no confirmed infection 
Spoke with TORY Villafuerte regarding order for Levofloxacin IVPB in regards to patient's intolerance to Ciprofloxacin. PA aware and approves use. Additionally, alerted PA that levofloxacin IVPB is on shortage, and recommended to change to PO is possible. PA aware, wished to continue IVPB.

## 2025-01-24 ENCOUNTER — TRANSCRIPTION ENCOUNTER (OUTPATIENT)
Age: 40
End: 2025-01-24

## 2025-01-24 ENCOUNTER — APPOINTMENT (OUTPATIENT)
Dept: PSYCHIATRY | Facility: CLINIC | Age: 40
End: 2025-01-24

## 2025-01-24 VITALS — DIASTOLIC BLOOD PRESSURE: 68 MMHG | SYSTOLIC BLOOD PRESSURE: 136 MMHG | OXYGEN SATURATION: 99 % | HEART RATE: 92 BPM

## 2025-01-24 LAB
ALBUMIN SERPL ELPH-MCNC: 3.7 G/DL — SIGNIFICANT CHANGE UP (ref 3.5–5.2)
ALP SERPL-CCNC: 108 U/L — SIGNIFICANT CHANGE UP (ref 30–115)
ALT FLD-CCNC: 31 U/L — SIGNIFICANT CHANGE UP (ref 0–41)
ANION GAP SERPL CALC-SCNC: 16 MMOL/L — HIGH (ref 7–14)
AST SERPL-CCNC: 16 U/L — SIGNIFICANT CHANGE UP (ref 0–41)
B PERT DNA SPEC QL NAA+PROBE: DETECTED
BILIRUB SERPL-MCNC: 0.3 MG/DL — SIGNIFICANT CHANGE UP (ref 0.2–1.2)
BUN SERPL-MCNC: 10 MG/DL — SIGNIFICANT CHANGE UP (ref 10–20)
CALCIUM SERPL-MCNC: 8.3 MG/DL — LOW (ref 8.4–10.5)
CHLORIDE SERPL-SCNC: 97 MMOL/L — LOW (ref 98–110)
CO2 SERPL-SCNC: 21 MMOL/L — SIGNIFICANT CHANGE UP (ref 17–32)
CREAT SERPL-MCNC: 0.6 MG/DL — LOW (ref 0.7–1.5)
EGFR: 117 ML/MIN/1.73M2 — SIGNIFICANT CHANGE UP
GLUCOSE BLDC GLUCOMTR-MCNC: 152 MG/DL — HIGH (ref 70–99)
GLUCOSE BLDC GLUCOMTR-MCNC: 231 MG/DL — HIGH (ref 70–99)
GLUCOSE BLDC GLUCOMTR-MCNC: 365 MG/DL — HIGH (ref 70–99)
GLUCOSE SERPL-MCNC: 330 MG/DL — HIGH (ref 70–99)
HCT VFR BLD CALC: 38.7 % — SIGNIFICANT CHANGE UP (ref 37–47)
HGB BLD-MCNC: 11.5 G/DL — LOW (ref 12–16)
LACTATE SERPL-SCNC: 1 MMOL/L — SIGNIFICANT CHANGE UP (ref 0.7–2)
LEGIONELLA AG UR QL: NEGATIVE — SIGNIFICANT CHANGE UP
MCHC RBC-ENTMCNC: 23.8 PG — LOW (ref 27–31)
MCHC RBC-ENTMCNC: 29.7 G/DL — LOW (ref 32–37)
MCV RBC AUTO: 80.1 FL — LOW (ref 81–99)
NRBC # BLD: 0 /100 WBCS — SIGNIFICANT CHANGE UP (ref 0–0)
NRBC BLD-RTO: 0 /100 WBCS — SIGNIFICANT CHANGE UP (ref 0–0)
PLATELET # BLD AUTO: 317 K/UL — SIGNIFICANT CHANGE UP (ref 130–400)
PMV BLD: 10.6 FL — HIGH (ref 7.4–10.4)
POTASSIUM SERPL-MCNC: 4.5 MMOL/L — SIGNIFICANT CHANGE UP (ref 3.5–5)
POTASSIUM SERPL-SCNC: 4.5 MMOL/L — SIGNIFICANT CHANGE UP (ref 3.5–5)
PROT SERPL-MCNC: 6.7 G/DL — SIGNIFICANT CHANGE UP (ref 6–8)
RAPID RVP RESULT: DETECTED
RBC # BLD: 4.83 M/UL — SIGNIFICANT CHANGE UP (ref 4.2–5.4)
RBC # FLD: 24.6 % — HIGH (ref 11.5–14.5)
S PNEUM AG UR QL: NEGATIVE — SIGNIFICANT CHANGE UP
SARS-COV-2 RNA SPEC QL NAA+PROBE: SIGNIFICANT CHANGE UP
SODIUM SERPL-SCNC: 134 MMOL/L — LOW (ref 135–146)
WBC # BLD: 7.49 K/UL — SIGNIFICANT CHANGE UP (ref 4.8–10.8)
WBC # FLD AUTO: 7.49 K/UL — SIGNIFICANT CHANGE UP (ref 4.8–10.8)

## 2025-01-24 PROCEDURE — 95819 EEG AWAKE AND ASLEEP: CPT | Mod: 26

## 2025-01-24 PROCEDURE — ZZZZZ: CPT

## 2025-01-24 PROCEDURE — 99252 IP/OBS CONSLTJ NEW/EST SF 35: CPT

## 2025-01-24 PROCEDURE — 99239 HOSP IP/OBS DSCHRG MGMT >30: CPT

## 2025-01-24 RX ORDER — ATORVASTATIN CALCIUM 80 MG/1
1 TABLET, FILM COATED ORAL
Qty: 30 | Refills: 2
Start: 2025-01-24

## 2025-01-24 RX ORDER — AZITHROMYCIN DIHYDRATE 500 MG/1
1 TABLET, FILM COATED ORAL
Qty: 5 | Refills: 0
Start: 2025-01-24 | End: 2025-01-28

## 2025-01-24 RX ORDER — ASPIRIN 81 MG/1
1 TABLET, COATED ORAL
Qty: 30 | Refills: 2
Start: 2025-01-24

## 2025-01-24 RX ADMIN — Medication 100 MILLIGRAM(S): at 11:59

## 2025-01-24 RX ADMIN — Medication 25 MILLIGRAM(S): at 06:48

## 2025-01-24 RX ADMIN — Medication 12 UNIT(S): at 07:58

## 2025-01-24 RX ADMIN — Medication 4: at 12:00

## 2025-01-24 RX ADMIN — ANTISEPTIC SURGICAL SCRUB 1 APPLICATION(S): 0.04 SOLUTION TOPICAL at 12:02

## 2025-01-24 RX ADMIN — Medication 25 MILLIGRAM(S): at 14:37

## 2025-01-24 RX ADMIN — Medication 12 UNIT(S): at 12:00

## 2025-01-24 RX ADMIN — INSULIN GLARGINE-YFGN 20 UNIT(S): 100 INJECTION, SOLUTION SUBCUTANEOUS at 07:59

## 2025-01-24 RX ADMIN — Medication 12 UNIT(S): at 16:31

## 2025-01-24 RX ADMIN — Medication 2: at 16:30

## 2025-01-24 RX ADMIN — Medication 10: at 07:58

## 2025-01-24 RX ADMIN — ASPIRIN 81 MILLIGRAM(S): 81 TABLET, COATED ORAL at 11:59

## 2025-01-24 NOTE — OCCUPATIONAL THERAPY INITIAL EVALUATION ADULT - NSACTIVITYREC_GEN_A_OT
Pt is able to safely perform ADLs and transfers with supervision with AD as needed. AM-PAC Daily Activity: 21. No skilled OT services required at this time. Please re-consult with change in status.

## 2025-01-24 NOTE — SWALLOW BEDSIDE ASSESSMENT ADULT - SLP PERTINENT HISTORY OF CURRENT PROBLEM
40 yo female with a medical history of  DM1, anxiety, depression, suicidal ideation,  ,asthma, GERD, HTN and obesity admitted to SDU on 1/22  for evaluation of syncope. patient states she went to urgent care and was diagnosed with bronchitis. patient then went food shopping and had a syncopal episode. While in hospital pt was evaluated by cardiology for elevated troponin level and by neurology for transient unresponsiveness. syncope / elevated troponin / transient episode of unresponsiveness. stroke code called 1/23 for AMS with initial NIH 30, later NIH 9. Etiology of symptoms possibly seizure related. Patient currently back to baseline. NIHSS=0, no focal deficits.

## 2025-01-24 NOTE — CONSULT NOTE ADULT - ASSESSMENT
38yo RHF PMH pseudoseizures, migraines, syncope, lumbar radiculopathy, insulin dependent diabetes with neuropathy presented to the ED for syncopal episode. Overnight stroke code was called when patient was unresponsive. Patient currently back to baseline. NIHSS=0, no focal deficits. Etiology of symptoms possibly seizure related.  38yo RHF PMH pseudoseizures, migraines, syncope, lumbar radiculopathy, insulin dependent diabetes with neuropathy presented to the ED for syncopal episode. Overnight stroke code was called when patient was unresponsive. Patient currently back to baseline. NIHSS=0, no focal deficits. rEEG normal. R/o medical causes     Recommendations  - No further inpatient neuro workup at this time  - F/u with Dr Bejarano, neurologist, outpatient   - Medical management per primary team    Discussed with Dr Vides attending

## 2025-01-24 NOTE — OCCUPATIONAL THERAPY INITIAL EVALUATION ADULT - PERTINENT HX OF CURRENT PROBLEM, REHAB EVAL
40 yo female presents to ed for evaluation of syncope. patient states she went to urgent care and was diagnosed with bronchitis. patient then went foot shopping and had a syncopal episode. patient does not remember what happened.  patient denies shortness of breath or chest pain. patient states she has body aches. Of note patient was recently ill with RSV and norovirus now resolved. Pt s/p stroke code.    CT brain 1/23: No acute territorial infarct, intracranial hemorrhage, or midline shift.

## 2025-01-24 NOTE — CONSULT NOTE ADULT - SUBJECTIVE AND OBJECTIVE BOX
NEUROLOGY CONSULT    HPI: 40yo RHF PMH pseudoseizures, migraines, syncope, lumbar radiculopathy, insulin dependent diabetes with neuropathy presented to the ED for syncopal episode. She notes that 2-3 weeks ago she was hospitalized for RSV and the stomach flu. Over the last few days, she has been feeling unwell again with productive cough and upset stomach. She went to Urgent Care yesterday, was diagnosed with bronchitis. While at the store to  prescriptions, she suddenly lost consciousness. The next thing she remembers is waking up in the ambulance. No tongue biting/urinary incontinence. Patient admitted for syncope and NSTEMI.     Overnight, patient was unresponsive and stroke code was called. Patient does not remember the episode. Neuro consulted.     To note, patient sees neurologist Dr Bejarano outpatient for migraines, lumbar radiculopathy, neuropathy. Last visit was October 2024. She has history of pseudo seizures, consisting of either full body shaking without LOC, or shaking of R hand usually occurring in social situations. She lives in an assisted living and utilizes rollator at baseline.     Baseline mRS= 4      MEDICATIONS  Home Medications:  insulin regular: 2.45 unit(s) subcutaneous every hour As directed use for continous insulin pump (06 Jan 2025 10:52)  methocarbamol 500 mg oral tablet: 1 tab(s) orally every 8 hours As needed Muscle Spasm (30 Dec 2024 16:08)  metoprolol tartrate 25 mg oral tablet: 1 tab(s) orally 3 times a day (10 Dec 2024 09:54)  naproxen 500 mg oral tablet: 1 tab(s) orally every 12 hours As needed pain (10 Dec 2024 09:54)  Norvasc 5 mg oral tablet: 1 tab(s) orally once a day (07 Nov 2024 22:03)  omeprazole 40 mg oral delayed release capsule: 1 cap(s) orally once a day (04 Dec 2024 09:08)    MEDICATIONS  (STANDING):  amLODIPine   Tablet 5 milliGRAM(s) Oral daily  aspirin  chewable 81 milliGRAM(s) Oral daily  atorvastatin 40 milliGRAM(s) Oral at bedtime  chlorhexidine 2% Cloths 1 Application(s) Topical daily  dextrose 5%. 1000 milliLiter(s) (100 mL/Hr) IV Continuous <Continuous>  dextrose 5%. 1000 milliLiter(s) (50 mL/Hr) IV Continuous <Continuous>  dextrose 50% Injectable 25 Gram(s) IV Push once  dextrose 50% Injectable 12.5 Gram(s) IV Push once  dextrose 50% Injectable 25 Gram(s) IV Push once  glucagon  Injectable 1 milliGRAM(s) IntraMuscular once  insulin glargine Injectable (LANTUS) 20 Unit(s) SubCutaneous every morning  insulin glargine Injectable (LANTUS) 10 Unit(s) SubCutaneous at bedtime  insulin lispro (ADMELOG) corrective regimen sliding scale   SubCutaneous three times a day before meals  insulin lispro Injectable (ADMELOG) 12 Unit(s) SubCutaneous three times a day before meals  metoprolol tartrate 25 milliGRAM(s) Oral three times a day  sertraline 100 milliGRAM(s) Oral daily    MEDICATIONS  (PRN):  acetaminophen     Tablet .. 650 milliGRAM(s) Oral every 6 hours PRN Temp greater or equal to 38C (100.4F), Moderate Pain (4 - 6)  dextrose Oral Gel 15 Gram(s) Oral once PRN Blood Glucose LESS THAN 70 milliGRAM(s)/deciliter  ondansetron Injectable 4 milliGRAM(s) IV Push three times a day PRN Nausea and/or Vomiting      SOCIAL HISTORY: negative for tobacco, alcohol, or illicit drug use.    Allergies  Clindamycin Phosphate (Unknown)  amoxicillin (Hives)  penicillins (Hives)  clindamycin (Hives; Nephrotoxicity)  Ceclor (Rash)  Fluad 0.5 ML ODETTE (Unknown)    Intolerances  gabapentin (Other)  Cipro (Other)  Influenza Virus Vaccine, H5N1, Inactivated (Other)      GEN: NAD, pleasant, cooperative    NEURO:   MENTAL STATUS: AAOx3. Able to recall president. Able to follow 3 step commands. Able to do simple math. Some difficulty with serial 7s   LANG/SPEECH: Fluent, intact naming, repetition & comprehension  CRANIAL NERVES:  II: Pupils equal round and reactive, no RAPD, normal visual fields  III, IV, VI: EOM intact, no gaze preference or deviation  V: normal  VII: no facial asymmetry  VIII: normal hearing to speech  MOTOR: 5/5 in both upper and lower extremities  REFLEXES: absent at achilles/patella. Downgoing toes   SENSORY: reduced sensation stocking distribution b/l LEs   COORD: Normal finger to nose and heel to shin, no tremor, no dysmetria    NIHSS: 0    LABS:                        11.5   7.49  )-----------( 317      ( 24 Jan 2025 05:21 )             38.7     01-24    134[L]  |  97[L]  |  10  ----------------------------<  330[H]  4.5   |  21  |  0.6[L]    Ca    8.3[L]      24 Jan 2025 05:21  Phos  3.6     01-23  Mg     2.0     01-23    TPro  6.7  /  Alb  3.7  /  TBili  0.3  /  DBili  x   /  AST  16  /  ALT  31  /  AlkPhos  108  01-24    Hemoglobin A1C:   Vitamin B12   PT/INR - ( 23 Jan 2025 20:05 )   PT: 14.50 sec;   INR: 1.22 ratio         PTT - ( 23 Jan 2025 20:05 )  PTT:30.8 sec  CAPILLARY BLOOD GLUCOSE  329 (23 Jan 2025 19:25)      POCT Blood Glucose.: 365 mg/dL (24 Jan 2025 07:54)      Urinalysis Basic - ( 24 Jan 2025 05:21 )    Color: x / Appearance: x / SG: x / pH: x  Gluc: 330 mg/dL / Ketone: x  / Bili: x / Urobili: x   Blood: x / Protein: x / Nitrite: x   Leuk Esterase: x / RBC: x / WBC x   Sq Epi: x / Non Sq Epi: x / Bacteria: x            Microbiology:    Culture - Blood (collected 22 Jan 2025 19:48)  Source: .Blood BLOOD  Preliminary Report (24 Jan 2025 02:02):    No growth at 24 hours    Culture - Blood (collected 22 Jan 2025 19:48)  Source: .Blood BLOOD  Preliminary Report (24 Jan 2025 02:02):    No growth at 24 hours        RADIOLOGY, EKG AND ADDITIONAL TESTS: Reviewed.           NEUROLOGY CONSULT    HPI: 40yo RHF PMH pseudoseizures, migraines, syncope, lumbar radiculopathy, insulin dependent diabetes with neuropathy presented to the ED for syncopal episode. She notes that 2-3 weeks ago she was hospitalized for RSV and the stomach flu. Over the last few days, she has been feeling unwell again with productive cough and upset stomach. She went to Urgent Care yesterday, was diagnosed with bronchitis. While at the store to  prescriptions, she suddenly lost consciousness. The next thing she remembers is waking up in the ambulance. No tongue biting/urinary incontinence. Patient admitted for syncope and NSTEMI.     Overnight, patient was unresponsive and stroke code was called. Patient does not remember the episode. Neuro consulted.     To note, patient sees neurologist Dr Bejarano outpatient for migraines, lumbar radiculopathy, neuropathy. Last visit was October 2024. She has history of pseudo seizures, consisting of either full body shaking without LOC, or shaking of R hand usually occurring in social situations. She lives in an assisted living and utilizes rollator at baseline.     Baseline mRS= 4      MEDICATIONS  Home Medications:  insulin regular: 2.45 unit(s) subcutaneous every hour As directed use for continous insulin pump (06 Jan 2025 10:52)  methocarbamol 500 mg oral tablet: 1 tab(s) orally every 8 hours As needed Muscle Spasm (30 Dec 2024 16:08)  metoprolol tartrate 25 mg oral tablet: 1 tab(s) orally 3 times a day (10 Dec 2024 09:54)  naproxen 500 mg oral tablet: 1 tab(s) orally every 12 hours As needed pain (10 Dec 2024 09:54)  Norvasc 5 mg oral tablet: 1 tab(s) orally once a day (07 Nov 2024 22:03)  omeprazole 40 mg oral delayed release capsule: 1 cap(s) orally once a day (04 Dec 2024 09:08)    MEDICATIONS  (STANDING):  amLODIPine   Tablet 5 milliGRAM(s) Oral daily  aspirin  chewable 81 milliGRAM(s) Oral daily  atorvastatin 40 milliGRAM(s) Oral at bedtime  chlorhexidine 2% Cloths 1 Application(s) Topical daily  dextrose 5%. 1000 milliLiter(s) (100 mL/Hr) IV Continuous <Continuous>  dextrose 5%. 1000 milliLiter(s) (50 mL/Hr) IV Continuous <Continuous>  dextrose 50% Injectable 25 Gram(s) IV Push once  dextrose 50% Injectable 12.5 Gram(s) IV Push once  dextrose 50% Injectable 25 Gram(s) IV Push once  glucagon  Injectable 1 milliGRAM(s) IntraMuscular once  insulin glargine Injectable (LANTUS) 20 Unit(s) SubCutaneous every morning  insulin glargine Injectable (LANTUS) 10 Unit(s) SubCutaneous at bedtime  insulin lispro (ADMELOG) corrective regimen sliding scale   SubCutaneous three times a day before meals  insulin lispro Injectable (ADMELOG) 12 Unit(s) SubCutaneous three times a day before meals  metoprolol tartrate 25 milliGRAM(s) Oral three times a day  sertraline 100 milliGRAM(s) Oral daily    MEDICATIONS  (PRN):  acetaminophen     Tablet .. 650 milliGRAM(s) Oral every 6 hours PRN Temp greater or equal to 38C (100.4F), Moderate Pain (4 - 6)  dextrose Oral Gel 15 Gram(s) Oral once PRN Blood Glucose LESS THAN 70 milliGRAM(s)/deciliter  ondansetron Injectable 4 milliGRAM(s) IV Push three times a day PRN Nausea and/or Vomiting      SOCIAL HISTORY: negative for tobacco, alcohol, or illicit drug use.    Allergies  Clindamycin Phosphate (Unknown)  amoxicillin (Hives)  penicillins (Hives)  clindamycin (Hives; Nephrotoxicity)  Ceclor (Rash)  Fluad 0.5 ML ODETTE (Unknown)    Intolerances  gabapentin (Other)  Cipro (Other)  Influenza Virus Vaccine, H5N1, Inactivated (Other)      GEN: NAD, pleasant, cooperative    NEURO:   MENTAL STATUS: AAOx3. Able to recall president. Able to follow 3 step commands. Able to do simple math. Some difficulty with serial 7s   LANG/SPEECH: Fluent, intact naming, repetition & comprehension  CRANIAL NERVES:  II: Pupils equal round and reactive, no RAPD, normal visual fields  III, IV, VI: EOM intact, no gaze preference or deviation  V: normal  VII: no facial asymmetry  VIII: normal hearing to speech  MOTOR: 5/5 in both upper and lower extremities  REFLEXES: absent at achilles/patella. Downgoing toes   SENSORY: reduced sensation stocking distribution b/l LEs. No neglect.   COORD: Normal finger to nose and heel to shin, no tremor, no dysmetria    NIHSS: 0    LABS:                        11.5   7.49  )-----------( 317      ( 24 Jan 2025 05:21 )             38.7     01-24    134[L]  |  97[L]  |  10  ----------------------------<  330[H]  4.5   |  21  |  0.6[L]    Ca    8.3[L]      24 Jan 2025 05:21  Phos  3.6     01-23  Mg     2.0     01-23    TPro  6.7  /  Alb  3.7  /  TBili  0.3  /  DBili  x   /  AST  16  /  ALT  31  /  AlkPhos  108  01-24    Hemoglobin A1C:   Vitamin B12   PT/INR - ( 23 Jan 2025 20:05 )   PT: 14.50 sec;   INR: 1.22 ratio         PTT - ( 23 Jan 2025 20:05 )  PTT:30.8 sec  CAPILLARY BLOOD GLUCOSE  329 (23 Jan 2025 19:25)      POCT Blood Glucose.: 365 mg/dL (24 Jan 2025 07:54)      Urinalysis Basic - ( 24 Jan 2025 05:21 )    Color: x / Appearance: x / SG: x / pH: x  Gluc: 330 mg/dL / Ketone: x  / Bili: x / Urobili: x   Blood: x / Protein: x / Nitrite: x   Leuk Esterase: x / RBC: x / WBC x   Sq Epi: x / Non Sq Epi: x / Bacteria: x            Microbiology:    Culture - Blood (collected 22 Jan 2025 19:48)  Source: .Blood BLOOD  Preliminary Report (24 Jan 2025 02:02):    No growth at 24 hours    Culture - Blood (collected 22 Jan 2025 19:48)  Source: .Blood BLOOD  Preliminary Report (24 Jan 2025 02:02):    No growth at 24 hours        RADIOLOGY      < from: CT Brain Stroke Protocol (01.23.25 @ 19:07) >  IMPRESSION:    No acute territorial infarct, intracranial hemorrhage, or midline shift.   Further evaluation with MRI can be considered if the symptoms persist (if   no contraindication).    Dr. Crow Gar discussed preliminary findings with [facility] on   1/23/2025 7:28 PM with readback.    Attending note:  I personally reviewed this examination and agree with the interpretation.    --- End of Report ---    < end of copied text >      < from: CT Angio Brain Stroke Protocol  w/ IV Cont (01.23.25 @ 19:09) >    IMPRESSION:    CT PERFUSION:  No perfusion deficits to suggest areas of completed infarction or at risk   territory.    CTA HEAD/NECK:  No largevessel occlusion, stenosis, aneurysm, or vascular malformation.    Incidentally noted enlarged adenoid with mildly enlarged left   retropharyngeal lymph node and left level IIb lymph nodes. Recommend   follow-up with ENT for direct visual inspection.    --- End of Report ---    < end of copied text >

## 2025-01-24 NOTE — DIETITIAN INITIAL EVALUATION ADULT - COLLABORATION WITH OTHER PROVIDERS
Intervention: meals and snacks, oral nutrition supplements, coordination of care;RN  Monitoring/Evaluation: diet order, energy intake, weights, labs, GI s/s, BG, skin status, NFPE

## 2025-01-24 NOTE — DIETITIAN INITIAL EVALUATION ADULT - PERTINENT LABORATORY DATA
01-24    134[L]  |  97[L]  |  10  ----------------------------<  330[H]  4.5   |  21  |  0.6[L]    Ca    8.3[L]      24 Jan 2025 05:21  Phos  3.6     01-23  Mg     2.0     01-23    TPro  6.7  /  Alb  3.7  /  TBili  0.3  /  DBili  x   /  AST  16  /  ALT  31  /  AlkPhos  108  01-24  POCT Blood Glucose.: 365 mg/dL (01-24-25 @ 07:54)  A1C with Estimated Average Glucose Result: 9.5 % (12-23-24 @ 08:53)  A1C with Estimated Average Glucose Result: 10.0 % (11-08-24 @ 06:35)  A1C with Estimated Average Glucose Result: 12.1 % (04-01-24 @ 07:30)

## 2025-01-24 NOTE — DIETITIAN INITIAL EVALUATION ADULT - ENERGY INTAKE
Pt reports poor po intake on admission, states she is only able to take a few bites of meals.  Reports some pain when swallowing due to recent bronchitis.

## 2025-01-24 NOTE — DIETITIAN INITIAL EVALUATION ADULT - PERTINENT MEDS FT
MEDICATIONS  (STANDING):  amLODIPine   Tablet 5 milliGRAM(s) Oral daily  aspirin  chewable 81 milliGRAM(s) Oral daily  atorvastatin 40 milliGRAM(s) Oral at bedtime  chlorhexidine 2% Cloths 1 Application(s) Topical daily  dextrose 5%. 1000 milliLiter(s) (100 mL/Hr) IV Continuous <Continuous>  dextrose 5%. 1000 milliLiter(s) (50 mL/Hr) IV Continuous <Continuous>  dextrose 50% Injectable 25 Gram(s) IV Push once  dextrose 50% Injectable 12.5 Gram(s) IV Push once  dextrose 50% Injectable 25 Gram(s) IV Push once  glucagon  Injectable 1 milliGRAM(s) IntraMuscular once  insulin glargine Injectable (LANTUS) 20 Unit(s) SubCutaneous every morning  insulin glargine Injectable (LANTUS) 10 Unit(s) SubCutaneous at bedtime  insulin lispro (ADMELOG) corrective regimen sliding scale   SubCutaneous three times a day before meals  insulin lispro Injectable (ADMELOG) 12 Unit(s) SubCutaneous three times a day before meals  metoprolol tartrate 25 milliGRAM(s) Oral three times a day  sertraline 100 milliGRAM(s) Oral daily    MEDICATIONS  (PRN):  acetaminophen     Tablet .. 650 milliGRAM(s) Oral every 6 hours PRN Temp greater or equal to 38C (100.4F), Moderate Pain (4 - 6)  dextrose Oral Gel 15 Gram(s) Oral once PRN Blood Glucose LESS THAN 70 milliGRAM(s)/deciliter  ondansetron Injectable 4 milliGRAM(s) IV Push three times a day PRN Nausea and/or Vomiting

## 2025-01-24 NOTE — SWALLOW BEDSIDE ASSESSMENT ADULT - SLP GENERAL OBSERVATIONS
Pt received awake OOBTC alert O2 via room air, AOx4, nondysarthric, expressive and receptive language intact. Denies dysphagia or cognitive linguistic changes
Statement Selected

## 2025-01-24 NOTE — PHYSICAL THERAPY INITIAL EVALUATION ADULT - PERTINENT HX OF CURRENT PROBLEM, REHAB EVAL
40 yo female presents to ed for evaluation of syncope. patient states she went to urgent care and was diagnosed with bronchitis. patient then went foot shopping and had a syncopal episode. patient does not remember what happened.  patient denies shortness of breath or chest pain. patient states she has body aches. Of note patient was recently ill with RSV and norovirus now resolved,

## 2025-01-24 NOTE — DIETITIAN INITIAL EVALUATION ADULT - ADD RECOMMEND
1. Continue with current diet order  2. Add Ensure Clear 2x/day (240 kcal, 8 gm protein per serving) - does not come in sugar free however pt with low PO intake on admission   3. Encourage PO prn     Pt at high risk

## 2025-01-24 NOTE — CONSULT NOTE ADULT - CONSULT REASON
stroke code, PMH pseudoseizure admitted for syncope stroke code called for unresponsiveness
syncope sp fall
abnormal trop
uncontrolled type 1 diabetes

## 2025-01-24 NOTE — EEG REPORT - NS EEG TEXT BOX
Brief Clinical History:  CRISTI MONTGOMERY is a 39 year old Female; study performed to investigate for seizures or markers of epilepsy.   Diagnosis Code: R55 syncope and collapse  CPT:  31167 (awake/drowsy)     Patient Medication:  LOPRESSOR    NORVASC    ZOFRAN    ZOLOFT      Acquisition Details:  Electroencephalography was acquired using a minimum of 21 channels on an [employer] system v 9.3.1 with electrode placement according to the standard International 10-20 system following ACNS (American Clinical Neurophysiology Society) guidelines.  Anterior temporal T1 and T2 electrodes were utilized whenever possible.   The [employer] automated spike & seizure detections were all reviewed in detail, in addition to the entire raw EEG.    Findings:  Background:  continuous  Voltage:  Normal (20uV)  Organization:  Appropriate anterior-posterior gradient  Posterior Dominant Rhythm:  9 Hz symmetric, well-organized, and well-modulated  Variability:  Yes	  Reactivity:  Yes  Sleep:  Absent.  Focal abnormalities:  No persistent asymmetries of voltage or frequency.  Interictal Activity:  None  Location:    Focal Slowing:  None  Generalized Slowing:  No  Events:  1)	No electrographic seizures or significant clinical events.  Provocations:  1)	Hyperventilation: was not performed.  2)	Photic stimulation: was not performed.    Impression:  normal    Clinical Correlation:  Normal study does not exclude diagnosis of seizure disorder    Domo Dior MD  Attending Neurologist, Division of Epilepsy

## 2025-01-24 NOTE — DISCHARGE NOTE PROVIDER - PROVIDER TOKENS
PROVIDER:[TOKEN:[[id number]],ESTABLISHEDPATIENT:[T]],PROVIDER:[TOKEN:[[id number]],ESTABLISHEDPATIENT:[T]],PROVIDER:[TOKEN:[[id number]]]

## 2025-01-24 NOTE — DISCHARGE NOTE NURSING/CASE MANAGEMENT/SOCIAL WORK - PATIENT PORTAL LINK FT
You can access the FollowMyHealth Patient Portal offered by [facility] by registering at the following website: [email or web address]. By joining [facility]’s FollowMyHealth portal, you will also be able to view your health information using other applications (apps) compatible with our system.

## 2025-01-24 NOTE — PROGRESS NOTE ADULT - SUBJECTIVE AND OBJECTIVE BOX
Patient seen and evaluated this am, feels good, no complaints, wants to return to [facility] adult home today      T(F): 97.7 (01-24-25 @ 07:01), Max: 99.7 (01-23-25 @ 15:01)  HR: 88 (01-24-25 @ 07:25)  BP: 119/61 (01-24-25 @ 07:25)  RR: 15 (01-24-25 @ 07:25)  SpO2: 97% (01-24-25 @ 07:25) (91% - 98%)    PHYSICAL EXAM:  GENERAL: NAD  HEAD:  Atraumatic, Normocephalic  EYES: EOMI, PERRLA, conjunctiva and sclera clear  NERVOUS SYSTEM:  Alert & Oriented X3, no focal deficits   CHEST/LUNG: Clear to percussion bilaterally; No rales, rhonchi, wheezing, or rubs  HEART: Regular rate and rhythm; No murmurs, rubs, or gallops  ABDOMEN: Soft, Nontender, Nondistended; Bowel sounds present  EXTREMITIES:  2+ Peripheral Pulses, No clubbing, cyanosis, or edema    LABS  01-24    134[L]  |  97[L]  |  10  ----------------------------<  330[H]  4.5   |  21  |  0.6[L]    Ca    8.3[L]      24 Jan 2025 05:21  Phos  3.6     01-23  Mg     2.0     01-23    TPro  6.7  /  Alb  3.7  /  TBili  0.3  /  DBili  x   /  AST  16  /  ALT  31  /  AlkPhos  108  01-24                          11.5   7.49  )-----------( 317      ( 24 Jan 2025 05:21 )             38.7     PT/INR - ( 23 Jan 2025 20:05 )   PT: 14.50 sec;   INR: 1.22 ratio         PTT - ( 23 Jan 2025 20:05 )  PTT:30.8 sec    CARDIAC ENZYMES    CKMB Units: 1.4 (01-23 @ 20:05)  CKMB Units: 3.1 (01-23 @ 11:52)    < from: 12 Lead ECG (01.23.25 @ 10:47) >    Diagnosis Line Sinus tachycardia  Possible Left atrial enlargement  Confirmed by ANTONIO HAWTHORNE MD ([id number]) on 1/23/2025 11:29:19 AM    < end of copied text >      Culture Results:   No growth at 24 hours (01-22-25)  Culture Results:   No growth at 24 hours (01-22-25)  Culture Results:   Culture grew 3 or more types of organisms which indicate  collection contamination; consider recollection only if clinically  indicated. (01-03-25)    RADIOLOGY  < from: CT Angio Neck Stroke Protocol w/ IV Cont (01.23.25 @ 19:09) >    IMPRESSION:    CT PERFUSION:  No perfusion deficits to suggest areas of completed infarction or at risk   territory.    CTA HEAD/NECK:  No largevessel occlusion, stenosis, aneurysm, or vascular malformation.    Incidentally noted enlarged adenoid with mildly enlarged left   retropharyngeal lymph node and left level IIb lymph nodes. Recommend   follow-up with ENT for direct visual inspection.    < end of copied text >    MEDICATIONS  (STANDING):  amLODIPine   Tablet 5 milliGRAM(s) Oral daily  aspirin  chewable 81 milliGRAM(s) Oral daily  atorvastatin 40 milliGRAM(s) Oral at bedtime  chlorhexidine 2% Cloths 1 Application(s) Topical daily  insulin glargine Injectable (LANTUS) 20 Unit(s) SubCutaneous every morning  insulin glargine Injectable (LANTUS) 10 Unit(s) SubCutaneous at bedtime  insulin lispro (ADMELOG) corrective regimen sliding scale   SubCutaneous three times a day before meals  insulin lispro Injectable (ADMELOG) 12 Unit(s) SubCutaneous three times a day before meals  metoprolol tartrate 25 milliGRAM(s) Oral three times a day  sertraline 100 milliGRAM(s) Oral daily    MEDICATIONS  (PRN):  acetaminophen     Tablet .. 650 milliGRAM(s) Oral every 6 hours PRN Temp greater or equal to 38C (100.4F), Moderate Pain (4 - 6)  dextrose Oral Gel 15 Gram(s) Oral once PRN Blood Glucose LESS THAN 70 milliGRAM(s)/deciliter  ondansetron Injectable 4 milliGRAM(s) IV Push three times a day PRN Nausea and/or Vomiting      Patient seen and evaluated this am, feels good, no complaints, wants to return to [facility] adult home today      T(F): 97.7 (01-24-25 @ 07:01), Max: 99.7 (01-23-25 @ 15:01)  HR: 88 (01-24-25 @ 07:25)  BP: 119/61 (01-24-25 @ 07:25)  RR: 15 (01-24-25 @ 07:25)  SpO2: 97% (01-24-25 @ 07:25) (91% - 98%)    PHYSICAL EXAM:  GENERAL: NAD  HEAD:  Atraumatic, Normocephalic  EYES: EOMI, PERRLA, conjunctiva and sclera clear  NERVOUS SYSTEM:  Alert & Oriented X3, no focal deficits   CHEST/LUNG: Clear to percussion bilaterally; No rales, rhonchi, wheezing, or rubs  HEART: Regular rate and rhythm; No murmurs, rubs, or gallops  ABDOMEN: Soft, Nontender, Nondistended; Bowel sounds present  EXTREMITIES:  2+ Peripheral Pulses, No clubbing, cyanosis, or edema    LABS  01-24    134[L]  |  97[L]  |  10  ----------------------------<  330[H]  4.5   |  21  |  0.6[L]    Ca    8.3[L]      24 Jan 2025 05:21  Phos  3.6     01-23  Mg     2.0     01-23    TPro  6.7  /  Alb  3.7  /  TBili  0.3  /  DBili  x   /  AST  16  /  ALT  31  /  AlkPhos  108  01-24                          11.5   7.49  )-----------( 317      ( 24 Jan 2025 05:21 )             38.7     PT/INR - ( 23 Jan 2025 20:05 )   PT: 14.50 sec;   INR: 1.22 ratio         PTT - ( 23 Jan 2025 20:05 )  PTT:30.8 sec    CARDIAC ENZYMES    CKMB Units: 1.4 (01-23 @ 20:05)  CKMB Units: 3.1 (01-23 @ 11:52)    < from: 12 Lead ECG (01.23.25 @ 10:47) >    Diagnosis Line Sinus tachycardia  Possible Left atrial enlargement  Confirmed by ANTONIO HAWTHORNE MD ([id number]) on 1/23/2025 11:29:19 AM    < end of copied text >      Culture Results:   No growth at 24 hours (01-22-25)  Culture Results:   No growth at 24 hours (01-22-25)  Culture Results:   Culture grew 3 or more types of organisms which indicate  collection contamination; consider recollection only if clinically  indicated. (01-03-25)    RADIOLOGY  < from: CT Angio Neck Stroke Protocol w/ IV Cont (01.23.25 @ 19:09) >    IMPRESSION:    CT PERFUSION:  No perfusion deficits to suggest areas of completed infarction or at risk   territory.    CTA HEAD/NECK:  No largevessel occlusion, stenosis, aneurysm, or vascular malformation.    Incidentally noted enlarged adenoid with mildly enlarged left   retropharyngeal lymph node and left level IIb lymph nodes. Recommend   follow-up with ENT for direct visual inspection.    < end of copied text >    EEG:    Impression:  normal    MEDICATIONS  (STANDING):  amLODIPine   Tablet 5 milliGRAM(s) Oral daily  aspirin  chewable 81 milliGRAM(s) Oral daily  atorvastatin 40 milliGRAM(s) Oral at bedtime  chlorhexidine 2% Cloths 1 Application(s) Topical daily  insulin glargine Injectable (LANTUS) 20 Unit(s) SubCutaneous every morning  insulin glargine Injectable (LANTUS) 10 Unit(s) SubCutaneous at bedtime  insulin lispro (ADMELOG) corrective regimen sliding scale   SubCutaneous three times a day before meals  insulin lispro Injectable (ADMELOG) 12 Unit(s) SubCutaneous three times a day before meals  metoprolol tartrate 25 milliGRAM(s) Oral three times a day  sertraline 100 milliGRAM(s) Oral daily    MEDICATIONS  (PRN):  acetaminophen     Tablet .. 650 milliGRAM(s) Oral every 6 hours PRN Temp greater or equal to 38C (100.4F), Moderate Pain (4 - 6)  dextrose Oral Gel 15 Gram(s) Oral once PRN Blood Glucose LESS THAN 70 milliGRAM(s)/deciliter  ondansetron Injectable 4 milliGRAM(s) IV Push three times a day PRN Nausea and/or Vomiting

## 2025-01-24 NOTE — DISCHARGE NOTE PROVIDER - NSDCCPCAREPLAN_GEN_ALL_CORE_FT
PRINCIPAL DISCHARGE DIAGNOSIS  Diagnosis: Syncope  Assessment and Plan of Treatment: your workup for your fainting episode was all normal. Please avoid stressful situations and take all your medicines as prescribed      SECONDARY DISCHARGE DIAGNOSES  Diagnosis: Mycoplasma pneumonia  Assessment and Plan of Treatment: you were found to have a mild infection. Please take your antibiotic for 5 days. return to the ER if your condition worsens

## 2025-01-24 NOTE — DISCHARGE NOTE PROVIDER - CARE PROVIDER_API CALL
Martha Robertson  Internal Medicine  [address] [place] [address]  Phone: [phone]  Fax: [phone]  Established Patient  Follow Up Time:     Casey Christian  Interventional Cardiology  [address], [address]  [place] [address]  Phone: [phone]  Fax: [phone]  Established Patient  Follow Up Time:     Boris Bejarano  Neurology  [address] [place] [address], [address]  [place] [address]  Phone: [phone]  Fax: [phone]  Follow Up Time:

## 2025-01-24 NOTE — PROGRESS NOTE ADULT - ASSESSMENT
38 yo female with a medical history of  DM1, anxiety, depression, suicidal ideation,  ,asthma, GERD, HTN and obesity admitted to SDU on 1/22  for evaluation of syncope. patient states she went to urgent care and was diagnosed with bronchitis. patient then went food shopping and had a syncopal episode. While in hospital pt was evaluated by cardiology for elevated troponin level and by neurology for transient unresponsiveness.     40 yo female with a medical history of  DM1, anxiety, depression, suicidal ideation,  ,asthma, GERD, HTN and obesity admitted to SDU on 1/22  for evaluation of syncope. patient states she went to urgent care and was diagnosed with bronchitis. patient then went food shopping and had a syncopal episode. While in hospital pt was evaluated by cardiology for elevated troponin level and by neurology for transient unresponsiveness.    syncope / elevated troponin / transient episode of unresponsiveness     - I discussed plan with cardiology - may DC home and f/u with Dr Agosto   - CTA head and neck, CT perfusion, EEG - all negative   - pt feels good and wants to go home - [facility]   - continue aspirin, Lipitor and metoprolol   - may DC home today if OK with neurology team   - 32min spent on pt care today

## 2025-01-24 NOTE — DISCHARGE NOTE PROVIDER - HOSPITAL COURSE
38 yo female with a medical history of  DM1, anxiety, depression, suicidal ideation,  ,asthma, GERD, HTN and obesity admitted to SDU on 1/22  for evaluation of syncope. patient states she went to urgent care and was diagnosed with bronchitis. patient then went food shopping and had a syncopal episode. While in hospital pt was evaluated by cardiology for elevated troponin level and by neurology for transient unresponsiveness.    syncope / elevated troponin / transient episode of unresponsiveness     - I discussed plan with cardiology - may DC home with medical management and f/u with Dr Agosto   - CTA head and neck, CT perfusion, EEG - all negative   - Found to be positive for mycoplasma - will dc with 5 day course of azithromycin    - pt feels good and wants to go home - [facility]   - continue aspirin, Lipitor and metoprolol   - may DC home today, OK with neurology team

## 2025-01-24 NOTE — CONSULT NOTE ADULT - ASSESSMENT
IMPRESSION: Rehab of 39 y.o f rehab  for  syncope  /polyneuropathy     PRECAUTIONS: [  ] Cardiac  [  ] Respiratory  [  ] Seizures [  ] Contact Isolation  [  ] Droplet Isolation  [ FALL ] Other    Weight Bearing Status:     RECOMMENDATION:    Out of Bed to Chair     DVT/Decubiti Prophylaxis    REHAB PLAN:     [ x  ] Bedside P/T 3-5 times a week   [  x ]   Bedside O/T  2-3 times a week             [   ] No Rehab Therapy Indicated                   [   ]  Speech Therapy   Conditioning/ROM                                    ADL  Bed Mobility                                               Conditioning/ROM  Transfers                                                     Bed Mobility  Sitting /Standing Balance                         Transfers                                        Gait Training                                               Sitting/Standing Balance  Stair Training [   ]Applicable                    Home equipment Eval                                                                        Splinting  [   ] Only      GOALS:   ADL   [  x ]   Independent                    Transfers  [ x  ] Independent                          Ambulation  [ x  ] Independent     [    ] With device                            [  x ]  CG                                                         [  x ]  CG                                                                  [ x  ] CG                            [    ] Min A                                                   [   ] Min A                                                              [   ] Min  A          DISCHARGE PLAN:   [   ]  Good candidate for Intensive Rehabilitation/Hospital based-[facility]                                             Will tolerate 3hrs Intensive Rehab Daily                                       [   xx ]  Short Term Rehab in Skilled Nursing Facility d/w  ptn rehab  plan  at snf for  STR pt /ot  ptn agree                                       [    ]  Home with Outpatient or VN services                                         [    ]  Possible Candidate for Intensive Hospital based Rehab

## 2025-01-24 NOTE — DIETITIAN INITIAL EVALUATION ADULT - OTHER INFO
38 yo female with a medical history of  DM1, anxiety, depression, suicidal ideation ,asthma, GERD, HTN and obesity admitted to SDU on 1/22  for evaluation of syncope. patient states she went to urgent care and was diagnosed with bronchitis. patient then went food shopping and had a syncopal episode. While in hospital pt was evaluated by cardiology for elevated troponin level and by neurology for transient unresponsiveness.    syncope / elevated troponin / transient episode of unresponsiveness

## 2025-01-24 NOTE — DISCHARGE NOTE PROVIDER - CARE PROVIDERS DIRECT ADDRESSES
[email or web address]

## 2025-01-24 NOTE — PHYSICAL THERAPY INITIAL EVALUATION ADULT - CRITERIA FOR SKILLED THERAPEUTIC INTERVENTIONS
Based on the findings pt is at base line functional level for amb w/ Rollator and transfers, demonstrated good balance with ADLs and will be d/c from PT

## 2025-01-24 NOTE — DISCHARGE NOTE PROVIDER - NSDCFUSCHEDAPPT_GEN_ALL_CORE_FT
Amadeo Palafox  [facility] PreAdmits  Scheduled Appointment: 01/28/2025    Amadeo Palafox  [facility]  OPHTHALM  [address]  Scheduled Appointment: 01/28/2025    Martha Robertson  [facility] PreAdmits  Scheduled Appointment: 02/03/2025    Martha Robertson  [facility]  INTMED  [address]  Scheduled Appointment: 02/03/2025    Nely Martinez  [facility]  GASTRO Doc Off [id number]  Scheduled Appointment: 02/10/2025    Jeyson Da Silva  [facility]  NEUROLOGY [address]  Scheduled Appointment: 03/24/2025    José Miguel Troy  [facility]  OTOLARYNG [address]  Scheduled Appointment: 04/11/2025

## 2025-01-24 NOTE — DISCHARGE NOTE PROVIDER - NSDCMRMEDTOKEN_GEN_ALL_CORE_FT
aspirin 81 mg oral tablet, chewable: 1 tab(s) orally once a day  atorvastatin 40 mg oral tablet: 1 tab(s) orally once a day (at bedtime)  azithromycin 500 mg oral tablet: 1 tab(s) orally once a day  ferrous sulfate 325 mg (65 mg elemental iron) oral tablet: 1 tab(s) orally once a day  insulin regular: 2.45 unit(s) subcutaneous every hour As directed use for continous insulin pump  lactobacillus acidophilus oral capsule: 1 tab(s) orally 2 times a day  methocarbamol 500 mg oral tablet: 1 tab(s) orally every 8 hours As needed Muscle Spasm  metoprolol tartrate 25 mg oral tablet: 1 tab(s) orally 3 times a day  naproxen 500 mg oral tablet: 1 tab(s) orally every 12 hours As needed pain  Norvasc 5 mg oral tablet: 1 tab(s) orally once a day  omeprazole 40 mg oral delayed release capsule: 1 cap(s) orally once a day  ondansetron 4 mg oral tablet: 1 tab(s) orally every 8 hours as needed for  nausea  sertraline 100 mg oral tablet: 1 tab(s) orally once a day x 14 days Continue to take as prescribed until told otherwise by your provider  simethicone 80 mg oral tablet, chewable: 1 tab(s) orally every 6 hours as needed for Gas

## 2025-01-24 NOTE — PHYSICAL THERAPY INITIAL EVALUATION ADULT - ADDITIONAL COMMENTS
pt lives alone in assisted facility. pt was independent with ADLs with rollator. facility has no stairs.

## 2025-01-24 NOTE — OCCUPATIONAL THERAPY INITIAL EVALUATION ADULT - LIVES WITH, PROFILE
mother in [facility] 0 steps to enter +tub +grab bars +raised toilet seat with rails/parents

## 2025-01-24 NOTE — OCCUPATIONAL THERAPY INITIAL EVALUATION ADULT - GENERAL OBSERVATIONS, REHAB EVAL
8:50-9:13; chart reviewed, ok to treat by Occupational Therapist as confirmed by RN Sunshine, Pt received seated in ASU recliner +tele +BP cuff +pulse ox +LUE heplock in NAD. Pt denied pain at rest and in agreement with OT IE.

## 2025-01-24 NOTE — OCCUPATIONAL THERAPY INITIAL EVALUATION ADULT - SITTING BALANCE: STATIC
good balance Otezla Counseling: The side effects of Otezla were discussed with the patient, including but not limited to worsening or new depression, weight loss, diarrhea, nausea, upper respiratory tract infection, and headache. Patient instructed to call the office should any adverse effect occur.  The patient verbalized understanding of the proper use and possible adverse effects of Otezla.  All the patient's questions and concerns were addressed.

## 2025-01-24 NOTE — CONSULT NOTE ADULT - SUBJECTIVE AND OBJECTIVE BOX
HPI: 40 y/o female presents to ed for evaluation of syncope. patient states she went to urgent care and was diagnosed with bronchitis. patient then went foot shopping and had a syncopal episode. patient does not remember what happened.  patient denies shortness of breath or chest pain. patient states she has body aches. Of note patient was recently ill with RSV and norovirus now resolved,  In the ED /101 hr 108 rr 16 afebrile saturating 97% on ra   SigLabs WBC 18 HST 66 repeat 131 ast 43 alt short rvp negative   ptn seen and exam  at  bed  side aox3  no new  c.o  ptn  labs  , imaging and  medical  notes are appreciated  and  reviewed      CT angio chest PE protocol negative for pe or acute thoracic pathology.  PTN  REFERRED TO ACUTE  REHAB  FOR  EVAL AND  TX   PAST MEDICAL & SURGICAL HISTORY:  Neuropathy  right lower extremity, vaginal      Type 1 diabetes      Degenerative disc disease, thoracic      Urinary tract infection, recurrent      Gastroesophageal reflux disease, esophagitis presence not specified      Intractable headache, unspecified chronicity pattern, unspecified headache type      Tremor of right hand      Tachycardia      Spinal stenosis      No significant past surgical history          Hospital Course:    TODAY'S SUBJECTIVE & REVIEW OF SYMPTOMS:     Constitutional WNL   Cardio WNL   Resp WNL   GI WNL  Heme WNL  Endo WNL  Skin WNL  MSK WNL  Neuro WNL  Cognitive WNL  Psych WNL      MEDICATIONS  (STANDING):  amLODIPine   Tablet 5 milliGRAM(s) Oral daily  aspirin  chewable 81 milliGRAM(s) Oral daily  atorvastatin 40 milliGRAM(s) Oral at bedtime  chlorhexidine 2% Cloths 1 Application(s) Topical daily  dextrose 5%. 1000 milliLiter(s) (100 mL/Hr) IV Continuous <Continuous>  dextrose 5%. 1000 milliLiter(s) (50 mL/Hr) IV Continuous <Continuous>  dextrose 50% Injectable 25 Gram(s) IV Push once  dextrose 50% Injectable 12.5 Gram(s) IV Push once  dextrose 50% Injectable 25 Gram(s) IV Push once  glucagon  Injectable 1 milliGRAM(s) IntraMuscular once  insulin glargine Injectable (LANTUS) 20 Unit(s) SubCutaneous every morning  insulin glargine Injectable (LANTUS) 10 Unit(s) SubCutaneous at bedtime  insulin lispro (ADMELOG) corrective regimen sliding scale   SubCutaneous three times a day before meals  insulin lispro Injectable (ADMELOG) 12 Unit(s) SubCutaneous three times a day before meals  metoprolol tartrate 25 milliGRAM(s) Oral three times a day  sertraline 100 milliGRAM(s) Oral daily    MEDICATIONS  (PRN):  acetaminophen     Tablet .. 650 milliGRAM(s) Oral every 6 hours PRN Temp greater or equal to 38C (100.4F), Moderate Pain (4 - 6)  dextrose Oral Gel 15 Gram(s) Oral once PRN Blood Glucose LESS THAN 70 milliGRAM(s)/deciliter  ondansetron Injectable 4 milliGRAM(s) IV Push three times a day PRN Nausea and/or Vomiting      FAMILY HISTORY:      Allergies    Clindamycin Phosphate (Unknown)  amoxicillin (Hives)  penicillins (Hives)  clindamycin (Hives; Nephrotoxicity)  Ceclor (Rash)  Fluad 0.5 ML ODETTE (Unknown)    Intolerances    gabapentin (Other)  Cipro (Other)  Influenza Virus Vaccine, H5N1, Inactivated (Other)      SOCIAL HISTORY:    [  ] Etoh  [  ] Smoking  [  ] Substance abuse     Home Environment:  [  ] Home Alone  [  ] Lives with Family  [  ] Home Health Aid    Dwelling:  [  ] Apartment  [  ] Private House  [  ] Adult Home  [ x] Skilled Nursing Facility [facility]      [  ] Short Term  [  ] Long Term  [  ] Stairs       Elevator [  ]    FUNCTIONAL STATUS PTA: (Check all that apply)  Ambulation: [ x  ]Independent    [  ] Dependent     [  ] Non-Ambulatory  Assistive Device: [  ] SA Cane  [  ]  Q Cane  [ x ] Walker rollator   [  ]  Wheelchair  ADL : [ x ] Independent  [  ]  Dependent       Vital Signs Last 24 Hrs  T(C): 36.5 (24 Jan 2025 07:01), Max: 37.6 (23 Jan 2025 15:01)  T(F): 97.7 (24 Jan 2025 07:01), Max: 99.7 (23 Jan 2025 15:01)  HR: 88 (24 Jan 2025 07:25) (82 - 103)  BP: 119/61 (24 Jan 2025 07:25) (99/53 - 144/74)  BP(mean): 84 (24 Jan 2025 07:25) (74 - 102)  RR: 15 (24 Jan 2025 07:25) (11 - 26)  SpO2: 97% (24 Jan 2025 07:25) (91% - 98%)    Parameters below as of 24 Jan 2025 07:25  Patient On (Oxygen Delivery Method): room air          PHYSICAL EXAM: Alert & Oriented X3  GENERAL: NAD, well-groomed, well-developed  HEAD:  Atraumatic, Normocephalic  EYES: EOMI, PERRLA, conjunctiva and sclera clear  NECK: Supple, No JVD, Normal thyroid  CHEST/LUNG: Clear to percussion bilaterally; No rales, rhonchi, wheezing, or rubs  HEART: Regular rate and rhythm; No murmurs, rubs, or gallops  ABDOMEN: Soft, Nontender, Nondistended; Bowel sounds present  EXTREMITIES:  2+ Peripheral Pulses, No clubbing, cyanosis, or edema    NERVOUS SYSTEM:  Cranial Nerves 2-12 intact [ x ] Abnormal  [  ]  ROM: WFL all extremities [x  ]  Abnormal [  ]  Motor Strength: WFL all extremities  [p  ]  Abnormal [  ]  Sensation: intact to light touch [  ] Abnormal [x  ]  Reflexes: Symmetric [  ]  Abnormal [x  ]    FUNCTIONAL STATUS:  Bed Mobility: Independent [  ]  Supervision [  ]  Needs Assistance [ x ]  N/A [  ]  Transfers: Independent [  ]  Supervision [  ]  Needs Assistance [ x ]  N/A [  ]   Ambulation: Independent [  ]  Supervision [  ]  Needs Assistance [x  ]  N/A [  ]  ADL: Independent [  ] Requires Assistance [  ] N/A [ x ]  SEE PT/OT IE NOTES    LABS:                        11.5   7.49  )-----------( 317      ( 24 Jan 2025 05:21 )             38.7     01-24    134[L]  |  97[L]  |  10  ----------------------------<  330[H]  4.5   |  21  |  0.6[L]    Ca    8.3[L]      24 Jan 2025 05:21  Phos  3.6     01-23  Mg     2.0     01-23    TPro  6.7  /  Alb  3.7  /  TBili  0.3  /  DBili  x   /  AST  16  /  ALT  31  /  AlkPhos  108  01-24    PT/INR - ( 23 Jan 2025 20:05 )   PT: 14.50 sec;   INR: 1.22 ratio         PTT - ( 23 Jan 2025 20:05 )  PTT:30.8 sec  Urinalysis Basic - ( 24 Jan 2025 05:21 )    Color: x / Appearance: x / SG: x / pH: x  Gluc: 330 mg/dL / Ketone: x  / Bili: x / Urobili: x   Blood: x / Protein: x / Nitrite: x   Leuk Esterase: x / RBC: x / WBC x   Sq Epi: x / Non Sq Epi: x / Bacteria: x        RADIOLOGY & ADDITIONAL STUDIES:< from: CT Brain Perfusion Maps Stroke (01.23.25 @ 19:08) >    CT PERFUSION:  No perfusion deficits to suggest areas of completed infarction or at risk   territory.    CTA HEAD/NECK:  No largevessel occlusion, stenosis, aneurysm, or vascular malformation.    Incidentally noted enlarged adenoid with mildly enlarged left   retropharyngeal lymph node and left level IIb lymph nodes. Recommend   follow-up with ENT for direct visual inspection.    < end of copied text >      Assesment:

## 2025-01-24 NOTE — SWALLOW BEDSIDE ASSESSMENT ADULT - SWALLOW EVAL: DIAGNOSIS
+toleration observed without overt s/s of aspiration/penetration for regular consistency and thin liquids

## 2025-01-24 NOTE — PHYSICAL THERAPY INITIAL EVALUATION ADULT - GENERAL OBSERVATIONS, REHAB EVAL
11:00-11:25 pt encountered in bed in NAD. + Heplock, + BP cuff and +pulse ox, +telemonitoring. KALLIE Guajardo made aware.

## 2025-01-24 NOTE — DISCHARGE NOTE NURSING/CASE MANAGEMENT/SOCIAL WORK - FINANCIAL ASSISTANCE
[facility] provides services at a reduced cost to those who are determined to be eligible through [facility]’s financial assistance program. Information regarding [facility]’s financial assistance program can be found by going to [email or web address] or by calling [phone].

## 2025-01-24 NOTE — OCCUPATIONAL THERAPY INITIAL EVALUATION ADULT - ADDITIONAL COMMENTS
PLOF obtained from pt. Pt owns rollator, upright RW, grab bar, raised toilet seat with rails.  (-) driving

## 2025-01-24 NOTE — DIETITIAN INITIAL EVALUATION ADULT - ORAL INTAKE PTA/DIET HISTORY
Pt reports good PO and appetite at baseline. NKFA. No diet restrictions/cultural diets. Pt takes iron at home. Reports  lb, 10-15 lb wt loss in past 1 month.    Pt reported UBW vs CBW on admission: 3.5 lb, 1.8% wt loss in 1 month. Pt does not meet malnutrition wt criteria at this time.

## 2025-01-25 ENCOUNTER — INPATIENT (INPATIENT)
Facility: HOSPITAL | Age: 40
LOS: 2 days | Discharge: HOME | DRG: 207 | End: 2025-01-28
Attending: HOSPITALIST | Admitting: STUDENT IN AN ORGANIZED HEALTH CARE EDUCATION/TRAINING PROGRAM
Payer: MEDICAID

## 2025-01-25 VITALS
RESPIRATION RATE: 20 BRPM | SYSTOLIC BLOOD PRESSURE: 133 MMHG | WEIGHT: 197.98 LBS | DIASTOLIC BLOOD PRESSURE: 79 MMHG | HEIGHT: 64 IN | TEMPERATURE: 98 F | HEART RATE: 98 BPM

## 2025-01-25 LAB
ALBUMIN SERPL ELPH-MCNC: 4 G/DL — SIGNIFICANT CHANGE UP (ref 3.5–5.2)
ALP SERPL-CCNC: 114 U/L — SIGNIFICANT CHANGE UP (ref 30–115)
ALT FLD-CCNC: 37 U/L — SIGNIFICANT CHANGE UP (ref 0–41)
ANION GAP SERPL CALC-SCNC: 13 MMOL/L — SIGNIFICANT CHANGE UP (ref 7–14)
AST SERPL-CCNC: 27 U/L — SIGNIFICANT CHANGE UP (ref 0–41)
BASOPHILS # BLD AUTO: 0.04 K/UL — SIGNIFICANT CHANGE UP (ref 0–0.2)
BASOPHILS NFR BLD AUTO: 0.4 % — SIGNIFICANT CHANGE UP (ref 0–1)
BILIRUB SERPL-MCNC: <0.2 MG/DL — SIGNIFICANT CHANGE UP (ref 0.2–1.2)
BUN SERPL-MCNC: 7 MG/DL — LOW (ref 10–20)
CALCIUM SERPL-MCNC: 9.1 MG/DL — SIGNIFICANT CHANGE UP (ref 8.4–10.4)
CHLORIDE SERPL-SCNC: 103 MMOL/L — SIGNIFICANT CHANGE UP (ref 98–110)
CO2 SERPL-SCNC: 23 MMOL/L — SIGNIFICANT CHANGE UP (ref 17–32)
CREAT SERPL-MCNC: 0.5 MG/DL — LOW (ref 0.7–1.5)
EGFR: 122 ML/MIN/1.73M2 — SIGNIFICANT CHANGE UP
EOSINOPHIL # BLD AUTO: 0.18 K/UL — SIGNIFICANT CHANGE UP (ref 0–0.7)
EOSINOPHIL NFR BLD AUTO: 1.6 % — SIGNIFICANT CHANGE UP (ref 0–8)
GLUCOSE SERPL-MCNC: 69 MG/DL — LOW (ref 70–99)
HCG SERPL QL: NEGATIVE — SIGNIFICANT CHANGE UP
HCT VFR BLD CALC: 39.7 % — SIGNIFICANT CHANGE UP (ref 37–47)
HGB BLD-MCNC: 12.2 G/DL — SIGNIFICANT CHANGE UP (ref 12–16)
IMM GRANULOCYTES NFR BLD AUTO: 0.8 % — HIGH (ref 0.1–0.3)
LIDOCAIN IGE QN: 14 U/L — SIGNIFICANT CHANGE UP (ref 7–60)
LYMPHOCYTES # BLD AUTO: 19.5 % — LOW (ref 20.5–51.1)
LYMPHOCYTES # BLD AUTO: 2.19 K/UL — SIGNIFICANT CHANGE UP (ref 1.2–3.4)
MCHC RBC-ENTMCNC: 24.7 PG — LOW (ref 27–31)
MCHC RBC-ENTMCNC: 30.7 G/DL — LOW (ref 32–37)
MCV RBC AUTO: 80.4 FL — LOW (ref 81–99)
MONOCYTES # BLD AUTO: 0.66 K/UL — HIGH (ref 0.1–0.6)
MONOCYTES NFR BLD AUTO: 5.9 % — SIGNIFICANT CHANGE UP (ref 1.7–9.3)
NEUTROPHILS # BLD AUTO: 8.06 K/UL — HIGH (ref 1.4–6.5)
NEUTROPHILS NFR BLD AUTO: 71.8 % — SIGNIFICANT CHANGE UP (ref 42.2–75.2)
NRBC # BLD: 0 /100 WBCS — SIGNIFICANT CHANGE UP (ref 0–0)
NRBC BLD-RTO: 0 /100 WBCS — SIGNIFICANT CHANGE UP (ref 0–0)
PLATELET # BLD AUTO: 371 K/UL — SIGNIFICANT CHANGE UP (ref 130–400)
PMV BLD: 10.1 FL — SIGNIFICANT CHANGE UP (ref 7.4–10.4)
POTASSIUM SERPL-MCNC: 3.3 MMOL/L — LOW (ref 3.5–5)
POTASSIUM SERPL-SCNC: 3.3 MMOL/L — LOW (ref 3.5–5)
PROT SERPL-MCNC: 6.9 G/DL — SIGNIFICANT CHANGE UP (ref 6–8)
RBC # BLD: 4.94 M/UL — SIGNIFICANT CHANGE UP (ref 4.2–5.4)
RBC # FLD: 24.9 % — HIGH (ref 11.5–14.5)
SODIUM SERPL-SCNC: 139 MMOL/L — SIGNIFICANT CHANGE UP (ref 135–146)
WBC # BLD: 11.22 K/UL — HIGH (ref 4.8–10.8)
WBC # FLD AUTO: 11.22 K/UL — HIGH (ref 4.8–10.8)

## 2025-01-25 PROCEDURE — 93010 ELECTROCARDIOGRAM REPORT: CPT

## 2025-01-25 PROCEDURE — 99285 EMERGENCY DEPT VISIT HI MDM: CPT

## 2025-01-25 RX ORDER — ONDANSETRON 4 MG/1
4 TABLET, ORALLY DISINTEGRATING ORAL ONCE
Refills: 0 | Status: COMPLETED | OUTPATIENT
Start: 2025-01-25 | End: 2025-01-25

## 2025-01-25 RX ORDER — SODIUM CHLORIDE 9 G/ML
1000 INJECTION, SOLUTION INTRAVENOUS ONCE
Refills: 0 | Status: COMPLETED | OUTPATIENT
Start: 2025-01-25 | End: 2025-01-25

## 2025-01-25 RX ORDER — DM/PSEUDOEPHED/ACETAMINOPHEN 10-30-250
50 CAPSULE ORAL ONCE
Refills: 0 | Status: COMPLETED | OUTPATIENT
Start: 2025-01-25 | End: 2025-01-25

## 2025-01-25 RX ORDER — POTASSIUM CHLORIDE 750 MG/1
40 TABLET, EXTENDED RELEASE ORAL ONCE
Refills: 0 | Status: COMPLETED | OUTPATIENT
Start: 2025-01-25 | End: 2025-01-26

## 2025-01-25 RX ORDER — FAMOTIDINE 10 MG/ML
20 INJECTION INTRAVENOUS ONCE
Refills: 0 | Status: COMPLETED | OUTPATIENT
Start: 2025-01-25 | End: 2025-01-25

## 2025-01-25 RX ADMIN — SODIUM CHLORIDE 1000 MILLILITER(S): 9 INJECTION, SOLUTION INTRAVENOUS at 21:16

## 2025-01-25 RX ADMIN — ONDANSETRON 4 MILLIGRAM(S): 4 TABLET, ORALLY DISINTEGRATING ORAL at 21:17

## 2025-01-25 RX ADMIN — Medication 50 MILLILITER(S): at 21:16

## 2025-01-25 RX ADMIN — FAMOTIDINE 20 MILLIGRAM(S): 10 INJECTION INTRAVENOUS at 21:17

## 2025-01-25 NOTE — ED PROVIDER NOTE - OBJECTIVE STATEMENT
Patient is a 39-year-old female PMH DM, anxiety, depression, asthma, GERD, HTN, who presents ED with complaints of multiple episodes of emesis with abdominal cramping for the past day.  Patient was recently admitted for syncope/NSTEMI and found to be mycoplasma positive and discharged on azithromycin yesterday.  Patient endorses an unchanged cough.  Patient also endorses decreased p.o. intake.  Patient lives at assisted living facility with her mom who had gastroenteritis symptoms a few days ago.  Denies fever, chills, CP, SOB, diarrhea, urinary symptoms.

## 2025-01-25 NOTE — ED PROVIDER NOTE - PROGRESS NOTE DETAILS
CR: Patient refusing EKG x2, awake and alert, explained risks of not obtaining EKG and patient still refuses. CR: Patient finally agreeable to EKG once her mother arrived in the ED.  QTc prolonged.  Despite 2 rounds of mag, QTc persistently prolonged.  Patient also endorsing inability to tolerate p.o. at this time.  Will admit to med telemetry for monitoring.

## 2025-01-25 NOTE — ED PROVIDER NOTE - ATTENDING APP SHARED VISIT CONTRIBUTION OF CARE
38 yo female with a medical history of  DM1, anxiety, depression, suicidal ideation,  ,asthma, GERD, HTN and obesity, recent admission for syncope, nstemi, developed sudden episode of ams- neuro believed more likely sz vs catatonia, dx w/ mycoplasma, dc on azithromycin yesterday  pt here for eval of n/v/abd cramping. + cough unchanged.  pt w/ decreased PO intake. pt went back to assisted living facility and states many people there including her mother have stomach bug and prior to development of sx.  no syncope/trauma.      vss  gen- NAD, aaox3  card-rrr  lungs-ctab, no wheezing or rhonchi  abd-sntnd, no guarding or rebound  neuro- full str/sensation, cn ii-xii grossly intact, normal coordination

## 2025-01-26 DIAGNOSIS — R94.31 ABNORMAL ELECTROCARDIOGRAM [ECG] [EKG]: ICD-10-CM

## 2025-01-26 LAB
ALBUMIN SERPL ELPH-MCNC: 3.9 G/DL — SIGNIFICANT CHANGE UP (ref 3.5–5.2)
ALP SERPL-CCNC: 114 U/L — SIGNIFICANT CHANGE UP (ref 30–115)
ALT FLD-CCNC: 33 U/L — SIGNIFICANT CHANGE UP (ref 0–41)
ANION GAP SERPL CALC-SCNC: 12 MMOL/L — SIGNIFICANT CHANGE UP (ref 7–14)
AST SERPL-CCNC: 25 U/L — SIGNIFICANT CHANGE UP (ref 0–41)
BASOPHILS # BLD AUTO: 0.04 K/UL — SIGNIFICANT CHANGE UP (ref 0–0.2)
BASOPHILS NFR BLD AUTO: 0.4 % — SIGNIFICANT CHANGE UP (ref 0–1)
BILIRUB SERPL-MCNC: <0.2 MG/DL — SIGNIFICANT CHANGE UP (ref 0.2–1.2)
BUN SERPL-MCNC: 4 MG/DL — LOW (ref 10–20)
CALCIUM SERPL-MCNC: 8.5 MG/DL — SIGNIFICANT CHANGE UP (ref 8.4–10.5)
CHLORIDE SERPL-SCNC: 100 MMOL/L — SIGNIFICANT CHANGE UP (ref 98–110)
CO2 SERPL-SCNC: 24 MMOL/L — SIGNIFICANT CHANGE UP (ref 17–32)
CREAT SERPL-MCNC: 0.5 MG/DL — LOW (ref 0.7–1.5)
EGFR: 122 ML/MIN/1.73M2 — SIGNIFICANT CHANGE UP
EOSINOPHIL # BLD AUTO: 0.32 K/UL — SIGNIFICANT CHANGE UP (ref 0–0.7)
EOSINOPHIL NFR BLD AUTO: 3.6 % — SIGNIFICANT CHANGE UP (ref 0–8)
GLUCOSE SERPL-MCNC: 140 MG/DL — HIGH (ref 70–99)
HCT VFR BLD CALC: 40.1 % — SIGNIFICANT CHANGE UP (ref 37–47)
HGB BLD-MCNC: 12.2 G/DL — SIGNIFICANT CHANGE UP (ref 12–16)
IMM GRANULOCYTES NFR BLD AUTO: 0.9 % — HIGH (ref 0.1–0.3)
LYMPHOCYTES # BLD AUTO: 2 K/UL — SIGNIFICANT CHANGE UP (ref 1.2–3.4)
LYMPHOCYTES # BLD AUTO: 22.2 % — SIGNIFICANT CHANGE UP (ref 20.5–51.1)
MAGNESIUM SERPL-MCNC: 2.2 MG/DL — SIGNIFICANT CHANGE UP (ref 1.8–2.4)
MCHC RBC-ENTMCNC: 24.5 PG — LOW (ref 27–31)
MCHC RBC-ENTMCNC: 30.4 G/DL — LOW (ref 32–37)
MCV RBC AUTO: 80.5 FL — LOW (ref 81–99)
MONOCYTES # BLD AUTO: 0.68 K/UL — HIGH (ref 0.1–0.6)
MONOCYTES NFR BLD AUTO: 7.6 % — SIGNIFICANT CHANGE UP (ref 1.7–9.3)
NEUTROPHILS # BLD AUTO: 5.88 K/UL — SIGNIFICANT CHANGE UP (ref 1.4–6.5)
NEUTROPHILS NFR BLD AUTO: 65.3 % — SIGNIFICANT CHANGE UP (ref 42.2–75.2)
NRBC # BLD: 0 /100 WBCS — SIGNIFICANT CHANGE UP (ref 0–0)
NRBC BLD-RTO: 0 /100 WBCS — SIGNIFICANT CHANGE UP (ref 0–0)
PLATELET # BLD AUTO: 382 K/UL — SIGNIFICANT CHANGE UP (ref 130–400)
PMV BLD: 10.5 FL — HIGH (ref 7.4–10.4)
POTASSIUM SERPL-MCNC: 4.3 MMOL/L — SIGNIFICANT CHANGE UP (ref 3.5–5)
POTASSIUM SERPL-SCNC: 4.3 MMOL/L — SIGNIFICANT CHANGE UP (ref 3.5–5)
PROT SERPL-MCNC: 6.5 G/DL — SIGNIFICANT CHANGE UP (ref 6–8)
RBC # BLD: 4.98 M/UL — SIGNIFICANT CHANGE UP (ref 4.2–5.4)
RBC # FLD: 24.8 % — HIGH (ref 11.5–14.5)
SODIUM SERPL-SCNC: 136 MMOL/L — SIGNIFICANT CHANGE UP (ref 135–146)
WBC # BLD: 9 K/UL — SIGNIFICANT CHANGE UP (ref 4.8–10.8)
WBC # FLD AUTO: 9 K/UL — SIGNIFICANT CHANGE UP (ref 4.8–10.8)

## 2025-01-26 PROCEDURE — 33285 INSJ SUBQ CAR RHYTHM MNTR: CPT

## 2025-01-26 PROCEDURE — 93010 ELECTROCARDIOGRAM REPORT: CPT | Mod: 76,77

## 2025-01-26 PROCEDURE — 85025 COMPLETE CBC W/AUTO DIFF WBC: CPT

## 2025-01-26 PROCEDURE — C1764: CPT

## 2025-01-26 PROCEDURE — 80053 COMPREHEN METABOLIC PANEL: CPT

## 2025-01-26 PROCEDURE — 82962 GLUCOSE BLOOD TEST: CPT

## 2025-01-26 PROCEDURE — 93005 ELECTROCARDIOGRAM TRACING: CPT

## 2025-01-26 PROCEDURE — 93010 ELECTROCARDIOGRAM REPORT: CPT

## 2025-01-26 PROCEDURE — 99232 SBSQ HOSP IP/OBS MODERATE 35: CPT

## 2025-01-26 PROCEDURE — 36415 COLL VENOUS BLD VENIPUNCTURE: CPT

## 2025-01-26 PROCEDURE — 80048 BASIC METABOLIC PNL TOTAL CA: CPT

## 2025-01-26 PROCEDURE — 83735 ASSAY OF MAGNESIUM: CPT

## 2025-01-26 RX ORDER — POTASSIUM CHLORIDE 750 MG/1
40 TABLET, EXTENDED RELEASE ORAL EVERY 4 HOURS
Refills: 0 | Status: COMPLETED | OUTPATIENT
Start: 2025-01-26 | End: 2025-01-26

## 2025-01-26 RX ORDER — SODIUM CHLORIDE 9 G/ML
1000 INJECTION, SOLUTION INTRAVENOUS
Refills: 0 | Status: DISCONTINUED | OUTPATIENT
Start: 2025-01-26 | End: 2025-01-28

## 2025-01-26 RX ORDER — INSULIN LISPRO 100/ML
VIAL (ML) SUBCUTANEOUS
Refills: 0 | Status: DISCONTINUED | OUTPATIENT
Start: 2025-01-26 | End: 2025-01-28

## 2025-01-26 RX ORDER — AMLODIPINE BESYLATE 5 MG
5 TABLET ORAL DAILY
Refills: 0 | Status: DISCONTINUED | OUTPATIENT
Start: 2025-01-26 | End: 2025-01-28

## 2025-01-26 RX ORDER — METOPROLOL SUCCINATE 25 MG
25 TABLET, EXTENDED RELEASE 24 HR ORAL THREE TIMES A DAY
Refills: 0 | Status: DISCONTINUED | OUTPATIENT
Start: 2025-01-26 | End: 2025-01-28

## 2025-01-26 RX ORDER — AZITHROMYCIN DIHYDRATE 500 MG/1
500 TABLET, FILM COATED ORAL EVERY 24 HOURS
Refills: 0 | Status: DISCONTINUED | OUTPATIENT
Start: 2025-01-27 | End: 2025-01-27

## 2025-01-26 RX ORDER — AZITHROMYCIN DIHYDRATE 500 MG/1
500 TABLET, FILM COATED ORAL ONCE
Refills: 0 | Status: COMPLETED | OUTPATIENT
Start: 2025-01-26 | End: 2025-01-26

## 2025-01-26 RX ORDER — AZITHROMYCIN DIHYDRATE 500 MG/1
TABLET, FILM COATED ORAL
Refills: 0 | Status: DISCONTINUED | OUTPATIENT
Start: 2025-01-26 | End: 2025-01-27

## 2025-01-26 RX ORDER — DM/PSEUDOEPHED/ACETAMINOPHEN 10-30-250
25 CAPSULE ORAL ONCE
Refills: 0 | Status: DISCONTINUED | OUTPATIENT
Start: 2025-01-26 | End: 2025-01-28

## 2025-01-26 RX ORDER — MAGNESIUM SULFATE 0.8 MEQ/ML
2 AMPUL (ML) INJECTION ONCE
Refills: 0 | Status: COMPLETED | OUTPATIENT
Start: 2025-01-26 | End: 2025-01-26

## 2025-01-26 RX ORDER — METOCLOPRAMIDE 10 MG/1
10 TABLET ORAL ONCE
Refills: 0 | Status: COMPLETED | OUTPATIENT
Start: 2025-01-26 | End: 2025-01-26

## 2025-01-26 RX ORDER — DM/PSEUDOEPHED/ACETAMINOPHEN 10-30-250
15 CAPSULE ORAL ONCE
Refills: 0 | Status: DISCONTINUED | OUTPATIENT
Start: 2025-01-26 | End: 2025-01-28

## 2025-01-26 RX ORDER — ASPIRIN 81 MG/1
81 TABLET, COATED ORAL DAILY
Refills: 0 | Status: DISCONTINUED | OUTPATIENT
Start: 2025-01-26 | End: 2025-01-28

## 2025-01-26 RX ORDER — DM/PSEUDOEPHED/ACETAMINOPHEN 10-30-250
12.5 CAPSULE ORAL ONCE
Refills: 0 | Status: DISCONTINUED | OUTPATIENT
Start: 2025-01-26 | End: 2025-01-28

## 2025-01-26 RX ORDER — INSULIN HUMAN 100 [IU]/ML
2.45 INJECTION, SOLUTION SUBCUTANEOUS
Qty: 0 | Refills: 0 | DISCHARGE

## 2025-01-26 RX ORDER — SERTRALINE HCL 100 MG
100 TABLET ORAL DAILY
Refills: 0 | Status: DISCONTINUED | OUTPATIENT
Start: 2025-01-26 | End: 2025-01-28

## 2025-01-26 RX ORDER — ATORVASTATIN CALCIUM 80 MG/1
40 TABLET, FILM COATED ORAL AT BEDTIME
Refills: 0 | Status: DISCONTINUED | OUTPATIENT
Start: 2025-01-26 | End: 2025-01-28

## 2025-01-26 RX ORDER — PANTOPRAZOLE 20 MG/1
40 TABLET, DELAYED RELEASE ORAL
Refills: 0 | Status: DISCONTINUED | OUTPATIENT
Start: 2025-01-26 | End: 2025-01-27

## 2025-01-26 RX ADMIN — SODIUM CHLORIDE 50 MILLILITER(S): 9 INJECTION, SOLUTION INTRAVENOUS at 23:39

## 2025-01-26 RX ADMIN — Medication 100 MILLIGRAM(S): at 13:06

## 2025-01-26 RX ADMIN — POTASSIUM CHLORIDE 40 MILLIEQUIVALENT(S): 750 TABLET, EXTENDED RELEASE ORAL at 00:17

## 2025-01-26 RX ADMIN — Medication 150 GRAM(S): at 00:40

## 2025-01-26 RX ADMIN — Medication 25 MILLIGRAM(S): at 13:06

## 2025-01-26 RX ADMIN — POTASSIUM CHLORIDE 40 MILLIEQUIVALENT(S): 750 TABLET, EXTENDED RELEASE ORAL at 17:31

## 2025-01-26 RX ADMIN — ATORVASTATIN CALCIUM 40 MILLIGRAM(S): 80 TABLET, FILM COATED ORAL at 21:27

## 2025-01-26 RX ADMIN — Medication 25 MILLIGRAM(S): at 21:27

## 2025-01-26 RX ADMIN — Medication 5 MILLIGRAM(S): at 17:31

## 2025-01-26 RX ADMIN — Medication 150 GRAM(S): at 02:35

## 2025-01-26 RX ADMIN — AZITHROMYCIN DIHYDRATE 500 MILLIGRAM(S): 500 TABLET, FILM COATED ORAL at 09:47

## 2025-01-26 RX ADMIN — PANTOPRAZOLE 40 MILLIGRAM(S): 20 TABLET, DELAYED RELEASE ORAL at 17:31

## 2025-01-26 RX ADMIN — POTASSIUM CHLORIDE 40 MILLIEQUIVALENT(S): 750 TABLET, EXTENDED RELEASE ORAL at 13:05

## 2025-01-26 RX ADMIN — ASPIRIN 81 MILLIGRAM(S): 81 TABLET, COATED ORAL at 13:05

## 2025-01-26 RX ADMIN — METOCLOPRAMIDE 10 MILLIGRAM(S): 10 TABLET ORAL at 02:35

## 2025-01-26 NOTE — ED ADULT NURSE REASSESSMENT NOTE - NS ED NURSE REASSESS COMMENT FT1
received handoff report from overnight RN. patient currently resting comfortably. no s/s of distress. side rails up, bed alarm in place, bed in lowest position. will continue pt care and safety.

## 2025-01-26 NOTE — H&P ADULT - ASSESSMENT
Patient is a 39-year-old female PMH of DM1, anxiety, depression, asthma, GERD, HTN who presents ED with complaints of multiple episodes of emesis with abdominal cramping for the past day.  Patient was recently admitted for syncope/NSTEMI and found to be mycoplasma positive and discharged on azithromycin.  Patient endorses an unchanged cough. Patient also endorses decreased p.o. intake. She lives at assisted living facility with her mom who had gastroenteritis symptoms a few days ago. Found to have prolonged qtc on ECG. Patient admitted for prolonged qtc likely secondary to electrolyte imbalanced caused by dehydration from vomiting/diarrhea.      # Prolonged qtc  # Hypokalemia  # Leukocytosis  # Suspected Gastroenteritis (Norovirus?)  - WBC 11.22  - EKG NSR with qtc of 605  - patient endorses diarrhea and vomiting with decreased PO intake  - K 3.3  - trend CBC and BMP  - replete electrolytes  - serieal EKGs  - tele monitoring    # DM1 on insulin pump  - seen by Dr. Galvez back in early january. Keep pump settings at: 12am to 12pm 2.65U/hr, 12pm to 4pm 3.4U/hr, 4pm to 12am 3.65U/hr   - can keep her insulin pump for now  - f/u endo consult  - ISS for additional coverage  - encouraged PO intake  - clear liquid diet    # Anxiety  # Depression  - c/w home sertraline    # Asthma  - No documented home inhalers on med list from assisted living    # GERD  # HTN  - c/w home meds      # DVT prophylaxis: Not indicated  # GI prophylaxis: PPI  # Diet: Clear liquids  # Activity: as tolerated  # Code status: Full Code  # Disposition: tele    Pending: clinical improvement, improvement of qtc,  Patient is a 39-year-old female PMH of DM1, anxiety, depression, asthma, GERD, HTN who presents ED with complaints of multiple episodes of emesis with abdominal cramping for the past day.  Patient was recently admitted for syncope and found to be mycoplasma positive and discharged on azithromycin.  Patient endorses an unchanged cough. Patient also endorses decreased p.o. intake. She lives at assisted living facility with her mom who had gastroenteritis symptoms a few days ago. Found to have prolonged qtc on ECG. Patient admitted for prolonged qtc likely secondary to electrolyte imbalanced caused by dehydration from vomiting/diarrhea.      # Prolonged qtc  # Hypokalemia  # Leukocytosis  # Suspected Gastroenteritis (Norovirus?)  - WBC 11.22  - EKG NSR with qtc of 605  - patient endorses diarrhea and vomiting with decreased PO intake  - K 3.3  - trend CBC and BMP  - replete electrolytes  - serieal EKGs  - tele monitoring    # DM1 on insulin pump  - seen by Dr. Galvez back in early january. Keep pump settings at: 12am to 12pm 2.65U/hr, 12pm to 4pm 3.4U/hr, 4pm to 12am 3.65U/hr   - can keep her insulin pump for now  - f/u endo consult  - ISS for additional coverage  - encouraged PO intake  - clear liquid diet    # Anxiety  # Depression  - c/w home sertraline    # Asthma  - No documented home inhalers on med list from assisted living    # GERD  # HTN  - c/w home meds      # DVT prophylaxis: Not indicated  # GI prophylaxis: PPI  # Diet: Clear liquids  # Activity: as tolerated  # Code status: Full Code  # Disposition: tele    Pending: clinical improvement, improvement of qtc,

## 2025-01-26 NOTE — H&P ADULT - HISTORY OF PRESENT ILLNESS
Patient is a 39-year-old female PMH of DM1, anxiety, depression, asthma, GERD, HTN who presents ED with complaints of multiple episodes of emesis with abdominal cramping for the past day.  Patient was recently admitted for syncope/NSTEMI and found to be mycoplasma positive and discharged on azithromycin.  Patient endorses an unchanged cough. Patient also endorses decreased p.o. intake. She lives at assisted living facility with her mom who had gastroenteritis symptoms a few days ago. Denies fever, chills, CP, SOB, diarrhea, urinary symptoms.      in the ED  /79, HR 98, Temp 97.9, SpO2 97% on RA  labs significant for WBC 11.22, K 3.3  EKG NSR with qtc of 605      Patient admitted for prolonged qtc likely secondary to electrolyte imbalanced caused by dehydration from vomiting/diarrhea. Patient is a 39-year-old female PMH of DM1, anxiety, depression, asthma, GERD, HTN who presents ED with complaints of multiple episodes of emesis with abdominal cramping for the past day.  Patient was recently admitted for syncope and found to be mycoplasma positive and discharged on azithromycin.  Patient endorses an unchanged cough. Patient also endorses decreased p.o. intake. She lives at assisted living facility with her mom who had gastroenteritis symptoms a few days ago. Denies fever, chills, CP, SOB, diarrhea, urinary symptoms.      in the ED  /79, HR 98, Temp 97.9, SpO2 97% on RA  labs significant for WBC 11.22, K 3.3  EKG NSR with qtc of 605      Patient admitted for prolonged qtc likely secondary to electrolyte imbalanced caused by dehydration from vomiting/diarrhea.

## 2025-01-26 NOTE — H&P ADULT - NSHPLABSRESULTS_GEN_ALL_CORE
Spoke with mom and informed the appts have been made for 3/22 to see us along with Endocrinology as a combined visit. Mom agreed.   
12.2   11.22 )-----------( 371      ( 25 Jan 2025 21:09 )             39.7       01-25    139  |  103  |  7[L]  ----------------------------<  69[L]  3.3[L]   |  23  |  0.5[L]    Ca    9.1      25 Jan 2025 21:09    TPro  6.9  /  Alb  4.0  /  TBili  <0.2  /  DBili  x   /  AST  27  /  ALT  37  /  AlkPhos  114  01-25              Urinalysis Basic - ( 25 Jan 2025 21:09 )    Color: x / Appearance: x / SG: x / pH: x  Gluc: 69 mg/dL / Ketone: x  / Bili: x / Urobili: x   Blood: x / Protein: x / Nitrite: x   Leuk Esterase: x / RBC: x / WBC x   Sq Epi: x / Non Sq Epi: x / Bacteria: x            Lactate Trend  01-24 @ 05:21 Lactate:1.0   01-23 @ 20:05 Lactate:1.4   01-22 @ 19:48 Lactate:1.7   01-22 @ 17:53 Lactate:2.3             CAPILLARY BLOOD GLUCOSE      POCT Blood Glucose.: 142 mg/dL (26 Jan 2025 09:46)        Culture Results:   No growth at 72 Hours (01-22 @ 19:48)  Culture Results:   No growth at 72 Hours (01-22 @ 19:48)  Culture Results:   Culture grew 3 or more types of organisms which indicate  collection contamination; consider recollection only if clinically  indicated. (01-03 @ 11:50)

## 2025-01-26 NOTE — H&P ADULT - NSHPPHYSICALEXAM_GEN_ALL_CORE
LOS:     VITALS:   T(C): 36.7 (01-26-25 @ 08:12), Max: 36.7 (01-26-25 @ 00:11)  HR: 88 (01-26-25 @ 08:12) (87 - 98)  BP: 131/76 (01-26-25 @ 08:12) (122/72 - 133/79)  RR: 18 (01-26-25 @ 08:12) (18 - 20)  SpO2: 97% (01-26-25 @ 08:12) (97% - 98%)    GENERAL: NAD, lying in bed comfortably  HEAD:  Atraumatic, Normocephalic  EYES: EOMI, PERRLA, conjunctiva and sclera clear  ENT: Moist mucous membranes  NECK: Supple, No JVD  CHEST/LUNG: Clear to auscultation bilaterally; No rales, rhonchi, wheezing, or rubs. Unlabored respirations  HEART: Regular rate and rhythm; No murmurs, rubs, or gallops  ABDOMEN: BSx4; Soft, nontender, nondistended  EXTREMITIES:  2+ Peripheral Pulses, brisk capillary refill. No clubbing, cyanosis, or edema  NERVOUS SYSTEM:  A&Ox3, no focal deficits   SKIN: No rashes or lesions

## 2025-01-26 NOTE — H&P ADULT - NSHPREVIEWOFSYSTEMS_GEN_ALL_CORE
REVIEW OF SYSTEMS:    CONSTITUTIONAL: weakness. No fevers or chills  EYES/ENT: No visual changes;  No vertigo or throat pain   NECK: No pain or stiffness  RESPIRATORY: No cough, wheezing, hemoptysis; No shortness of breath  CARDIOVASCULAR: No chest pain or palpitations  GASTROINTESTINAL: Vomiting and diarrhea  GENITOURINARY: No dysuria, frequency or hematuria  NEUROLOGICAL: No numbness or weakness  SKIN: No itching, rashes

## 2025-01-26 NOTE — H&P ADULT - ATTENDING COMMENTS
40 yo female with h/o DM, anxiety/depression recent hospitalization in [facility] after a syncopal episode. Neurology and cardiology workup negative. Then discharged from hospital with plan of outpatient follow up. Later pt presented to [facility] with episodes of  nausea and vomiting, diarrhea.     N/V  Diarrhea  -possible gastroenteritis  -obtain GI PCR if further diarrhea episode    DM  -on insulin pump    To complete azithromycin course for mycoplasma infection  Serial EKG for prolonged QTc    Handoff: pending clinical improvement

## 2025-01-27 ENCOUNTER — TRANSCRIPTION ENCOUNTER (OUTPATIENT)
Age: 40
End: 2025-01-27

## 2025-01-27 LAB
ALBUMIN SERPL ELPH-MCNC: 3.8 G/DL — SIGNIFICANT CHANGE UP (ref 3.5–5.2)
ALP SERPL-CCNC: 101 U/L — SIGNIFICANT CHANGE UP (ref 30–115)
ALT FLD-CCNC: 34 U/L — SIGNIFICANT CHANGE UP (ref 0–41)
ANION GAP SERPL CALC-SCNC: 10 MMOL/L — SIGNIFICANT CHANGE UP (ref 7–14)
ANION GAP SERPL CALC-SCNC: 12 MMOL/L — SIGNIFICANT CHANGE UP (ref 7–14)
AST SERPL-CCNC: 36 U/L — SIGNIFICANT CHANGE UP (ref 0–41)
BASOPHILS # BLD AUTO: 0.03 K/UL — SIGNIFICANT CHANGE UP (ref 0–0.2)
BASOPHILS NFR BLD AUTO: 0.4 % — SIGNIFICANT CHANGE UP (ref 0–1)
BILIRUB SERPL-MCNC: 0.2 MG/DL — SIGNIFICANT CHANGE UP (ref 0.2–1.2)
BUN SERPL-MCNC: <3 MG/DL — LOW (ref 10–20)
BUN SERPL-MCNC: <3 MG/DL — LOW (ref 10–20)
CALCIUM SERPL-MCNC: 8.7 MG/DL — SIGNIFICANT CHANGE UP (ref 8.4–10.5)
CALCIUM SERPL-MCNC: 9.2 MG/DL — SIGNIFICANT CHANGE UP (ref 8.4–10.5)
CHLORIDE SERPL-SCNC: 103 MMOL/L — SIGNIFICANT CHANGE UP (ref 98–110)
CHLORIDE SERPL-SCNC: 104 MMOL/L — SIGNIFICANT CHANGE UP (ref 98–110)
CO2 SERPL-SCNC: 25 MMOL/L — SIGNIFICANT CHANGE UP (ref 17–32)
CO2 SERPL-SCNC: 25 MMOL/L — SIGNIFICANT CHANGE UP (ref 17–32)
CREAT SERPL-MCNC: 0.5 MG/DL — LOW (ref 0.7–1.5)
CREAT SERPL-MCNC: <0.5 MG/DL — LOW (ref 0.7–1.5)
EGFR: 122 ML/MIN/1.73M2 — SIGNIFICANT CHANGE UP
EGFR: 129 ML/MIN/1.73M2 — SIGNIFICANT CHANGE UP
EOSINOPHIL # BLD AUTO: 0.22 K/UL — SIGNIFICANT CHANGE UP (ref 0–0.7)
EOSINOPHIL NFR BLD AUTO: 2.6 % — SIGNIFICANT CHANGE UP (ref 0–8)
GLUCOSE BLDC GLUCOMTR-MCNC: 160 MG/DL — HIGH (ref 70–99)
GLUCOSE BLDC GLUCOMTR-MCNC: 188 MG/DL — HIGH (ref 70–99)
GLUCOSE BLDC GLUCOMTR-MCNC: 43 MG/DL — CRITICAL LOW (ref 70–99)
GLUCOSE BLDC GLUCOMTR-MCNC: 69 MG/DL — LOW (ref 70–99)
GLUCOSE BLDC GLUCOMTR-MCNC: 88 MG/DL — SIGNIFICANT CHANGE UP (ref 70–99)
GLUCOSE SERPL-MCNC: 104 MG/DL — HIGH (ref 70–99)
GLUCOSE SERPL-MCNC: 186 MG/DL — HIGH (ref 70–99)
HCT VFR BLD CALC: 39.8 % — SIGNIFICANT CHANGE UP (ref 37–47)
HGB BLD-MCNC: 12.1 G/DL — SIGNIFICANT CHANGE UP (ref 12–16)
IMM GRANULOCYTES NFR BLD AUTO: 1.1 % — HIGH (ref 0.1–0.3)
LYMPHOCYTES # BLD AUTO: 2.11 K/UL — SIGNIFICANT CHANGE UP (ref 1.2–3.4)
LYMPHOCYTES # BLD AUTO: 24.9 % — SIGNIFICANT CHANGE UP (ref 20.5–51.1)
MAGNESIUM SERPL-MCNC: 2 MG/DL — SIGNIFICANT CHANGE UP (ref 1.8–2.4)
MCHC RBC-ENTMCNC: 24.7 PG — LOW (ref 27–31)
MCHC RBC-ENTMCNC: 30.4 G/DL — LOW (ref 32–37)
MCV RBC AUTO: 81.4 FL — SIGNIFICANT CHANGE UP (ref 81–99)
MONOCYTES # BLD AUTO: 0.6 K/UL — SIGNIFICANT CHANGE UP (ref 0.1–0.6)
MONOCYTES NFR BLD AUTO: 7.1 % — SIGNIFICANT CHANGE UP (ref 1.7–9.3)
NEUTROPHILS # BLD AUTO: 5.41 K/UL — SIGNIFICANT CHANGE UP (ref 1.4–6.5)
NEUTROPHILS NFR BLD AUTO: 63.9 % — SIGNIFICANT CHANGE UP (ref 42.2–75.2)
NRBC # BLD: 0 /100 WBCS — SIGNIFICANT CHANGE UP (ref 0–0)
NRBC BLD-RTO: 0 /100 WBCS — SIGNIFICANT CHANGE UP (ref 0–0)
PLATELET # BLD AUTO: 398 K/UL — SIGNIFICANT CHANGE UP (ref 130–400)
PMV BLD: 10.1 FL — SIGNIFICANT CHANGE UP (ref 7.4–10.4)
POTASSIUM SERPL-MCNC: 5.3 MMOL/L — HIGH (ref 3.5–5)
POTASSIUM SERPL-MCNC: 5.4 MMOL/L — HIGH (ref 3.5–5)
POTASSIUM SERPL-SCNC: 5.3 MMOL/L — HIGH (ref 3.5–5)
POTASSIUM SERPL-SCNC: 5.4 MMOL/L — HIGH (ref 3.5–5)
PROT SERPL-MCNC: 6.5 G/DL — SIGNIFICANT CHANGE UP (ref 6–8)
RBC # BLD: 4.89 M/UL — SIGNIFICANT CHANGE UP (ref 4.2–5.4)
RBC # FLD: 24.8 % — HIGH (ref 11.5–14.5)
SODIUM SERPL-SCNC: 139 MMOL/L — SIGNIFICANT CHANGE UP (ref 135–146)
SODIUM SERPL-SCNC: 140 MMOL/L — SIGNIFICANT CHANGE UP (ref 135–146)
WBC # BLD: 8.46 K/UL — SIGNIFICANT CHANGE UP (ref 4.8–10.8)
WBC # FLD AUTO: 8.46 K/UL — SIGNIFICANT CHANGE UP (ref 4.8–10.8)

## 2025-01-27 PROCEDURE — 99233 SBSQ HOSP IP/OBS HIGH 50: CPT

## 2025-01-27 PROCEDURE — 93010 ELECTROCARDIOGRAM REPORT: CPT

## 2025-01-27 PROCEDURE — 93010 ELECTROCARDIOGRAM REPORT: CPT | Mod: 77

## 2025-01-27 PROCEDURE — 99222 1ST HOSP IP/OBS MODERATE 55: CPT

## 2025-01-27 RX ORDER — ATORVASTATIN CALCIUM 80 MG/1
1 TABLET, FILM COATED ORAL
Qty: 0 | Refills: 0 | DISCHARGE
Start: 2025-01-27

## 2025-01-27 RX ORDER — DM/PSEUDOEPHED/ACETAMINOPHEN 10-30-250
12.5 CAPSULE ORAL ONCE
Refills: 0 | Status: COMPLETED | OUTPATIENT
Start: 2025-01-27 | End: 2025-01-27

## 2025-01-27 RX ORDER — MAGNESIUM SULFATE 0.8 MEQ/ML
2 AMPUL (ML) INJECTION ONCE
Refills: 0 | Status: COMPLETED | OUTPATIENT
Start: 2025-01-27 | End: 2025-01-27

## 2025-01-27 RX ORDER — SODIUM ZIRCONIUM CYCLOSILICATE 5 G/5G
5 POWDER, FOR SUSPENSION ORAL ONCE
Refills: 0 | Status: COMPLETED | OUTPATIENT
Start: 2025-01-27 | End: 2025-01-27

## 2025-01-27 RX ORDER — ACETAMINOPHEN 160 MG/5ML
650 SUSPENSION ORAL EVERY 6 HOURS
Refills: 0 | Status: DISCONTINUED | OUTPATIENT
Start: 2025-01-27 | End: 2025-01-28

## 2025-01-27 RX ORDER — DOXYCYCLINE HYCLATE 100 MG/1
1 CAPSULE ORAL
Qty: 10 | Refills: 0
Start: 2025-01-27 | End: 2025-01-31

## 2025-01-27 RX ORDER — DOXYCYCLINE HYCLATE 100 MG/1
100 CAPSULE ORAL EVERY 12 HOURS
Refills: 0 | Status: DISCONTINUED | OUTPATIENT
Start: 2025-01-27 | End: 2025-01-28

## 2025-01-27 RX ORDER — FAMOTIDINE 10 MG/ML
20 INJECTION INTRAVENOUS DAILY
Refills: 0 | Status: DISCONTINUED | OUTPATIENT
Start: 2025-01-27 | End: 2025-01-28

## 2025-01-27 RX ORDER — SODIUM CHLORIDE 9 G/ML
1000 INJECTION, SOLUTION INTRAVENOUS
Refills: 0 | Status: DISCONTINUED | OUTPATIENT
Start: 2025-01-27 | End: 2025-01-28

## 2025-01-27 RX ADMIN — SODIUM CHLORIDE 75 MILLILITER(S): 9 INJECTION, SOLUTION INTRAVENOUS at 09:10

## 2025-01-27 RX ADMIN — Medication 100 MILLIGRAM(S): at 11:53

## 2025-01-27 RX ADMIN — SODIUM ZIRCONIUM CYCLOSILICATE 5 GRAM(S): 5 POWDER, FOR SUSPENSION ORAL at 18:17

## 2025-01-27 RX ADMIN — FAMOTIDINE 20 MILLIGRAM(S): 10 INJECTION INTRAVENOUS at 11:52

## 2025-01-27 RX ADMIN — Medication 12.5 GRAM(S): at 23:56

## 2025-01-27 RX ADMIN — Medication 25 GRAM(S): at 13:01

## 2025-01-27 RX ADMIN — Medication 25 MILLIGRAM(S): at 05:54

## 2025-01-27 RX ADMIN — Medication 5 MILLIGRAM(S): at 05:54

## 2025-01-27 RX ADMIN — DOXYCYCLINE HYCLATE 100 MILLIGRAM(S): 100 CAPSULE ORAL at 09:10

## 2025-01-27 RX ADMIN — ASPIRIN 81 MILLIGRAM(S): 81 TABLET, COATED ORAL at 11:52

## 2025-01-27 RX ADMIN — Medication 25 MILLIGRAM(S): at 21:21

## 2025-01-27 RX ADMIN — Medication 25 MILLIGRAM(S): at 16:15

## 2025-01-27 RX ADMIN — ATORVASTATIN CALCIUM 40 MILLIGRAM(S): 80 TABLET, FILM COATED ORAL at 21:21

## 2025-01-27 RX ADMIN — DOXYCYCLINE HYCLATE 100 MILLIGRAM(S): 100 CAPSULE ORAL at 18:15

## 2025-01-27 RX ADMIN — ACETAMINOPHEN 650 MILLIGRAM(S): 160 SUSPENSION ORAL at 18:24

## 2025-01-27 NOTE — DISCHARGE NOTE PROVIDER - NSDCFUADDAPPT_GEN_ALL_CORE_FT
APPTS ARE READY TO BE MADE: [x ] YES    Best Family or Patient Contact (if needed):    Additional Information about above appointments (if needed):    1: IM: Post hospital follow up    Other comments or requests:

## 2025-01-27 NOTE — DISCHARGE NOTE PROVIDER - ATTENDING DISCHARGE PHYSICAL EXAMINATION:
Attending attestation  Attending DC note  Pt seen and examined at bedside. No cp or sob.   vitals, labs, exam stable  Hospital course as above.  Plan dw pt and agreed to plan  Medically cleared for DC. Med recc completed.  [facility] resident. Spent 32 mins on case

## 2025-01-27 NOTE — CONSULT NOTE ADULT - ASSESSMENT
type 1 diabetes and obesity, continue on her default medtronic insulin pump settings, keep well hydrated.
Patient is a 39-year-old female PMH of DM1, anxiety, depression, asthma, GERD, HTN who presents ED with complaints of multiple episodes of emesis with abdominal cramping for the past day.  Patient was recently admitted for syncope and found to be mycoplasma positive and discharged on azithromycin.  She lives at assisted living facility with her mom who had gastroenteritis symptoms a few days ago. Denies fever, chills, CP, SOB, diarrhea, urinary symptoms.  Of note, patient was seen in 2022 by EP for evaluation of 2 syncopal episodes.  EP was consulted for a recent syncopal episode in the setting of QTc prolongation on EKG.    - prolong QTc, baseline -490. Today QTc 505.   - anxiety/depression  - HTN  - gastroenteritis    Continue telemetry monitoring, no episodes on telemetry  Please keep electrolytes K>4, Mg>2  Please obtain outpatient records from Cardiology Dr. Hopper  Daily EKGs  Please avoid QTc prolonging medications  Patient may benefit from cardiac monitoring device  Please recall once records obtained

## 2025-01-27 NOTE — DISCHARGE NOTE PROVIDER - NSDCMRMEDTOKEN_GEN_ALL_CORE_FT
aspirin 81 mg oral tablet, chewable: 1 tab(s) orally once a day  atorvastatin 40 mg oral tablet: 1 tab(s) orally once a day (at bedtime)  doxycycline hyclate 100 mg oral tablet: 1 tab(s) orally 2 times a day  famotidine 40 mg oral tablet: 1 tab(s) orally once a day  ferrous sulfate 325 mg (65 mg elemental iron) oral tablet: 1 tab(s) orally once a day  insulin regular: 2.45 unit(s) subcutaneous every hour As directed use for continous insulin pump  metoprolol tartrate 25 mg oral tablet: 1 tab(s) orally 3 times a day  Norvasc 5 mg oral tablet: 1 tab(s) orally once a day  Nurtec ODT 75 mg oral tablet, disintegratin tab(s) orally once a day as needed for  headache  omeprazole 40 mg oral delayed release capsule: 1 cap(s) orally once a day  sertraline 100 mg oral tablet: 1 tab(s) orally once a day x 14 days Continue to take as prescribed until told otherwise by your provider   aspirin 81 mg oral tablet, chewable: 1 tab(s) orally once a day  atorvastatin 40 mg oral tablet: 1 tab(s) orally once a day (at bedtime)  doxycycline hyclate 100 mg oral tablet: 1 tab(s) orally 2 times a day  famotidine 40 mg oral tablet: 1 tab(s) orally once a day  insulin regular: 2.45 unit(s) subcutaneous every hour As directed use for continous insulin pump  metoprolol tartrate 25 mg oral tablet: 1 tab(s) orally 3 times a day  Norvasc 5 mg oral tablet: 1 tab(s) orally once a day  Nurtec ODT 75 mg oral tablet, disintegratin tab(s) orally once a day as needed for  headache  omeprazole 40 mg oral delayed release capsule: 1 cap(s) orally once a day  sertraline 100 mg oral tablet: 1 tab(s) orally once a day x 14 days Continue to take as prescribed until told otherwise by your provider

## 2025-01-27 NOTE — CONSULT NOTE ADULT - SUBJECTIVE AND OBJECTIVE BOX
Reason for Endocrinology Consult: Diabetes    HPI: 39y Female    Diabetes Type:  Duration of Diabetes:  Diabetes Complications:  History of DKA?  Eye doctor within past year?  Current Therapy:      H  PAST MEDICAL & SURGICAL HISTORY:  Neuropathy  right lower extremity, vaginal      Type 1 diabetes      Degenerative disc disease, thoracic      Urinary tract infection, recurrent      Gastroesophageal reflux disease, esophagitis presence not specified      Intractable headache, unspecified chronicity pattern, unspecified headache type      Tremor of right hand      Tachycardia      Spinal stenosis      No significant past surgical history        FAMILY HISTORY:      SH:  Smoking  Etoh:  Recreational Drugs:    Home Medications:  famotidine 40 mg oral tablet: 1 tab(s) orally once a day (2025 11:34)  insulin regular: 2.45 unit(s) subcutaneous every hour As directed use for continous insulin pump (2025 11:35)  metoprolol tartrate 25 mg oral tablet: 1 tab(s) orally 3 times a day (2025 11:35)  Norvasc 5 mg oral tablet: 1 tab(s) orally once a day (2025 11:35)  Nurtec ODT 75 mg oral tablet, disintegratin tab(s) orally once a day as needed for  headache (2025 11:34)  omeprazole 40 mg oral delayed release capsule: 1 cap(s) orally once a day (2025 11:35)      Current (Non-Endocrine) Meds:  amLODIPine   Tablet 5 milliGRAM(s) Oral daily  aspirin  chewable 81 milliGRAM(s) Oral daily  azithromycin  IVPB      dextrose 5%. 1000 milliLiter(s) IV Continuous <Continuous>  metoprolol tartrate 25 milliGRAM(s) Oral three times a day  pantoprazole    Tablet 40 milliGRAM(s) Oral before breakfast  potassium chloride   Powder 40 milliEquivalent(s) Oral every 4 hours  sertraline 100 milliGRAM(s) Oral daily      Current Endocrine Meds:   atorvastatin 40 milliGRAM(s) Oral at bedtime  dextrose 50% Injectable 25 Gram(s) IV Push once  dextrose 50% Injectable 12.5 Gram(s) IV Push once  dextrose 50% Injectable 25 Gram(s) IV Push once  dextrose Oral Gel 15 Gram(s) Oral once PRN  insulin lispro (ADMELOG) corrective regimen sliding scale   SubCutaneous three times a day before meals      Allergies:  Influenza Virus Vaccine, H5N1, Inactivated (Other)  Cipro (Other)  penicillins (Hives)  Ceclor (Rash)  clindamycin (Hives; Nephrotoxicity)  amoxicillin (Hives)  gabapentin (Other)  Fluad 0.5 ML ODETTE (Unknown)  Clindamycin Phosphate (Unknown)      ROS:  Denies the following except as indicated.    General: weight loss/weight gain, decreased appetite, fatigue, fever  Eyes: blurry vision, double vision  ENT: neck swelling, dysphagia, voice changes   CV: palpitations, SOB, chest pain, cough  GI: nausea, vomiting, diarrhea, constipation, abdominal pain  : nocturia,  polyuria, dysuria  Endo: decreased libido, heat/cold intolerance, jitteriness  MSK: arthralgias, myalgias  Skin: rash, dryness, diaphoresis  Neuro: pedal numbness,pedal paresthesias, pedal pain    Height (cm): 162.6 ( @ 20:06), 162.6 ( @ :32)  Weight (kg): 89.8 ( @ 20:06), 84.6 ( @ :32)  BMI (kg/m2): 34 ( @ 20:06), 32 ( @ :32)    Vital Signs Last 24 Hrs  T(C): 36.7 (2025 08:12), Max: 36.7 (2025 00:11)  T(F): 98 (2025 08:12), Max: 98.1 (2025 00:11)  HR: 88 (2025 08:12) (87 - 98)  BP: 131/76 (2025 08:12) (122/72 - 133/79)  BP(mean): --  RR: 18 (2025 08:12) (18 - 20)  SpO2: 97% (2025 08:12) (97% - 98%)    Parameters below as of 2025 08:12  Patient On (Oxygen Delivery Method): room air      Constitutional: WN/WD in NAD.   Neck: no thyromegaly or palpable thyroid nodules   Respiratory: lungs CTAB.  Cardiovascular: regular rate and rhythm, normal S1 and S2, no audible murmurs  GI: soft, NT/ND, no masses/HSM appreciated.  Ext: no edema, no ulcers, pedal pulses palpable bilaterally  Neurology: no tremor, monofilament sensation intact in feet  Psychiatric: A&O x 3, normal affect/mood.        LABS:                        12.2   9.00  )-----------( 382      ( 2025 11:47 )             40.1         136  |  100  |  4[L]  ----------------------------<  140[H]  4.3   |  24  |  0.5[L]    Ca    8.5      2025 11:47  Mg     2.2         TPro  6.5  /  Alb  3.9  /  TBili  <0.2  /  DBili  x   /  AST  25  /  ALT  33  /  AlkPhos  114        Urinalysis Basic - ( 2025 11:47 )    Color: x / Appearance: x / SG: x / pH: x  Gluc: 140 mg/dL / Ketone: x  / Bili: x / Urobili: x   Blood: x / Protein: x / Nitrite: x   Leuk Esterase: x / RBC: x / WBC x   Sq Epi: x / Non Sq Epi: x / Bacteria: x                             Thyroid Stimulating Hormone, Serum: 0.66 ( @ 08:53)          RADIOLOGY & ADDITIONAL STUDIES:    A/P:39yFemale        Pt can follow up at discharge with:    Above discussed with resident.
Patient is a 39y old  Female who presents with a chief complaint of prolonged qtc (2025 15:31)        HPI:  Patient is a 39-year-old female PMH of DM1, anxiety, depression, asthma, GERD, HTN who presents ED with complaints of multiple episodes of emesis with abdominal cramping for the past day.  Patient was recently admitted for syncope and found to be mycoplasma positive and discharged on azithromycin.  She lives at assisted living facility with her mom who had gastroenteritis symptoms a few days ago. Denies fever, chills, CP, SOB, diarrhea, urinary symptoms.  Of note, patient was seen in  by EP for evaluation of 2 syncopal episodes.  EP was consulted for a recent syncopal episode in the setting of QTc prolongation on EKG.      REVIEW OF SYSTEMS  [x] A ten-point review of systems was otherwise negative except as noted.  [ ] Due to altered mental status/intubation, subjective information were not able to be obtained from the patient. History was obtained, to the extent possible, from review of the chart and collateral sources of information.      PAST MEDICAL & SURGICAL HISTORY:  Neuropathy  right lower extremity, vaginal  Type 1 diabetes  Degenerative disc disease, thoracic  Urinary tract infection, recurrent  Gastroesophageal reflux disease, esophagitis presence not specified  Intractable headache, unspecified chronicity pattern, unspecified headache type  Tremor of right hand  Tachycardia  Spinal stenosis  No significant past surgical history      Home Medications:  atorvastatin 40 mg oral tablet: 1 tab(s) orally once a day (at bedtime) (2025 12:13)  famotidine 40 mg oral tablet: 1 tab(s) orally once a day (2025 11:34)  insulin regular: 2.45 unit(s) subcutaneous every hour As directed use for continous insulin pump (2025 11:35)  metoprolol tartrate 25 mg oral tablet: 1 tab(s) orally 3 times a day (2025 11:35)  Norvasc 5 mg oral tablet: 1 tab(s) orally once a day (2025 11:35)  Nurtec ODT 75 mg oral tablet, disintegratin tab(s) orally once a day as needed for  headache (2025 11:34)  omeprazole 40 mg oral delayed release capsule: 1 cap(s) orally once a day (2025 11:35)      Allergies:  Influenza Virus Vaccine, H5N1, Inactivated (Other)  Cipro (Other)  penicillins (Hives)  Ceclor (Rash)  clindamycin (Hives; Nephrotoxicity)  amoxicillin (Hives)  gabapentin (Other)  Fluad 0.5 ML ODETTE (Unknown)  Clindamycin Phosphate (Unknown)      FAMILY HISTORY:      SOCIAL HISTORY:    CIGARETTES:  ALCOHOL:  MARIJUANA:  ILLICIT DRUGS:        PREVIOUS DIAGNOSTIC TESTING:      ECHO  FINDINGS:  Summary:   1. Technically limited study.   2. LV Ejection Fraction by Leahy's Method with a biplane EF of 61 %.   3. Normal left atrial size.   4. Mild mitral annular calcification.   5. Endocardial visualization was enhanced with intravenous echo contrast.      STRESS  FINDINGS:    CATHETERIZATION  FINDINGS:    ELECTROPHYSIOLOGY STUDY  FINDINGS:    CAROTID ULTRASOUND:  FINDINGS    VENOUS DUPLEX SCAN:  FINDINGS:    CHEST CT PULMONARY ANGIO with IV Contrast:  FINDINGS:      MEDICATIONS  (STANDING):  amLODIPine   Tablet 5 milliGRAM(s) Oral daily  aspirin  chewable 81 milliGRAM(s) Oral daily  atorvastatin 40 milliGRAM(s) Oral at bedtime  dextrose 5%. 1000 milliLiter(s) (50 mL/Hr) IV Continuous <Continuous>  dextrose 5%. 1000 milliLiter(s) (50 mL/Hr) IV Continuous <Continuous>  dextrose 50% Injectable 25 Gram(s) IV Push once  dextrose 50% Injectable 12.5 Gram(s) IV Push once  dextrose 50% Injectable 25 Gram(s) IV Push once  doxycycline IVPB 100 milliGRAM(s) IV Intermittent every 12 hours  famotidine    Tablet 20 milliGRAM(s) Oral daily  insulin lispro (ADMELOG) corrective regimen sliding scale   SubCutaneous three times a day before meals  lactated ringers. 1000 milliLiter(s) (75 mL/Hr) IV Continuous <Continuous>  metoprolol tartrate 25 milliGRAM(s) Oral three times a day  sertraline 100 milliGRAM(s) Oral daily  sodium zirconium cyclosilicate 5 Gram(s) Oral once    MEDICATIONS  (PRN):  dextrose Oral Gel 15 Gram(s) Oral once PRN Blood Glucose LESS THAN 70 milliGRAM(s)/deciliter      Vital Signs Last 24 Hrs  T(C): 36.7 (2025 07:27), Max: 36.7 (2025 07:27)  T(F): 98 (2025 07:27), Max: 98 (2025 07:27)  HR: 78 (2025 07:27) (69 - 86)  BP: 125/70 (2025 07:27) (113/46 - 125/70)  BP(mean): --  RR: 18 (2025 07:27) (18 - 18)  SpO2: 98% (2025 07:27) (98% - 99%)    Parameters below as of 2025 07:27  Patient On (Oxygen Delivery Method): room air        PHYSICAL EXAM:    GENERAL: In no apparent distress, well nourished, and hydrated.  HEAD:  Atraumatic, Normocephalic  EYES: EOMI, PERRLA, conjunctiva and sclera clear  NECK: Supple and normal thyroid.  No JVD or carotid bruit.  Carotid pulse is 2+ bilaterally.  HEART: Regular rate and rhythm; No murmurs, rubs, or gallops.  PULMONARY: Clear to auscultation and perfusion.  No rales, wheezing, or rhonchi bilaterally.  ABDOMEN: Soft, Nontender, Nondistended; Bowel sounds present  EXTREMITIES:  2+ Peripheral Pulses, No clubbing, cyanosis, or edema  NEUROLOGICAL: Grossly nonfocal    I&O's Detail    Daily     Daily     INTERPRETATION OF TELEMETRY:    EC Lead ECG:   Ventricular Rate 90 BPM    Atrial Rate 90 BPM    P-R Interval 128 ms    QRS Duration 70 ms    Q-T Interval 410 ms    QTC Calculation(Bazett) 501 ms    P Axis 37 degrees    R Axis 104 degrees    T Axis 77 degrees    Diagnosis Line Normal sinus rhythm  Rightward axis  Prolonged QT  Abnormal ECG    Confirmed by José Miguel Bradford ([id number]) on 2025 1:41:10 PM (25 @ 12:29)  12 Lead ECG:   Ventricular Rate 85 BPM    Atrial Rate 85 BPM    P-R Interval 128 ms    QRS Duration 80 ms    Q-T Interval 430 ms    QTC Calculation(Bazett) 511 ms    P Axis 28 degrees    R Axis 16 degrees    T Axis 68 degrees    Diagnosis Line Normal sinus rhythm  Nonspecific T wave abnormality  Prolonged QT  Abnormal ECG    Confirmed by Laura Pinzon ([id number]) on 2025 3:28:46 PM (25 @ 10:29)  12 Lead ECG:   Ventricular Rate 79 BPM    Atrial Rate 79 BPM    P-R Interval 116 ms    QRS Duration 80 ms    Q-T Interval 528 ms    QTC Calculation(Bazett) 605 ms    P Axis 46 degrees    R Axis 117 degrees    T Axis 93 degrees    Diagnosis Line Normal sinus rhythm  Right axis deviation  Prolonged QT  Abnormal ECG    Confirmed by ANTONIO HAWTHORNE MD ([id number]) on 2025 7:09:25 AM (25 @ 04:43)  12 Lead ECG:   Ventricular Rate 83 BPM    Atrial Rate 83 BPM    P-R Interval 112 ms    QRS Duration 74 ms    Q-T Interval 434 ms    QTC Calculation(Bazett) 509 ms    P Axis 38 degrees    R Axis 116 degrees    T Axis 99 degrees    Diagnosis Line Normal sinus rhythm  Right axis deviation  Nonspecific ST abnormality  Prolonged QT  Abnormal ECG    Confirmed by Laura Pinzon ([id number]) on 2025 10:48:48 AM (25 @ 01:50)  12 Lead ECG:   Ventricular Rate 92 BPM    Atrial Rate 92 BPM    P-R Interval 114 ms    QRS Duration 74 ms    Q-T Interval 434 ms    QTC Calculation(Bazett) 536 ms    P Axis 27 degrees    R Axis 149 degrees    T Axis 83 degrees    Diagnosis Line Normal sinus rhythm  Right axis deviation  Nonspecific ST abnormality  Prolonged QT  Abnormal ECG    Confirmed by Laura Pinzon ([id number]) on 2025 10:47:57 AM (25 @ 23:58)        LABS:                        12.1   8.46  )-----------( 398      ( 2025 07:15 )             39.8         140  |  103  |  <3[L]  ----------------------------<  186[H]  5.3[H]   |  25  |  0.5[L]    Ca    9.2      2025 11:33  Mg     2.0         TPro  6.5  /  Alb  3.8  /  TBili  0.2  /  DBili  x   /  AST  36  /  ALT  34  /  AlkPhos  101            Urinalysis Basic - ( 2025 11:33 )    Color: x / Appearance: x / SG: x / pH: x  Gluc: 186 mg/dL / Ketone: x  / Bili: x / Urobili: x   Blood: x / Protein: x / Nitrite: x   Leuk Esterase: x / RBC: x / WBC x   Sq Epi: x / Non Sq Epi: x / Bacteria: x      BNP            RADIOLOGY & ADDITIONAL STUDIES:

## 2025-01-27 NOTE — DISCHARGE NOTE PROVIDER - HOSPITAL COURSE
Patient is a 39-year-old female PMH of DM1, anxiety, depression, asthma, GERD, HTN who presents ED with complaints of multiple episodes of emesis with abdominal cramping for the past day.  Patient was recently admitted for syncope and found to be mycoplasma positive and discharged on azithromycin.  Patient endorses an unchanged cough. Patient also endorses decreased p.o. intake. She lives at assisted living facility with her mom who had gastroenteritis symptoms a few days ago. Found to have prolonged qtc on ECG. Patient admitted for prolonged qtc likely secondary to electrolyte imbalanced caused by dehydration from vomiting/diarrhea.      # Prolonged qtc  # Hypokalemia  # Leukocytosis  # Suspected Gastroenteritis (Norovirus?)  - WBC 11.22  - EKG NSR with qtc of 605  - patient endorses diarrhea and vomiting with decreased PO intake  - replete electrolytes  - Repeat EKG  - tele monitoring, unremarkable    # DM1 on insulin pump  - seen by Dr. Galvez back in early january. Keep pump settings at: 12am to 12pm 2.65U/hr, 12pm to 4pm 3.4U/hr, 4pm to 12am 3.65U/hr   - can keep her insulin pump for now  - f/u endo consult  - ISS for additional coverage  - encouraged PO intake  - clear liquid diet    # Anxiety  # Depression  - c/w home sertraline    # Asthma  - No documented home inhalers on med list from assisted living    # GERD  # HTN  - D/C pantoprazole  - Started pepcid   Patient is a 39-year-old female PMH of DM1, anxiety, depression, asthma, GERD, HTN who presents ED with complaints of multiple episodes of emesis with abdominal cramping for the past day.  Patient was recently admitted for syncope and found to be mycoplasma positive and discharged on azithromycin.  Patient endorses an unchanged cough. Patient also endorses decreased p.o. intake. She lives at assisted living facility with her mom who had gastroenteritis symptoms a few days ago. Found to have prolonged qtc on ECG. Patient admitted for prolonged qtc likely secondary to electrolyte imbalanced caused by dehydration from vomiting/diarrhea.      # Prolonged qtc  # Hypokalemia  # Leukocytosis  # Suspected Gastroenteritis (Norovirus?)  - WBC 11.22  - EKG NSR with qtc of 605  - patient endorses diarrhea and vomiting with decreased PO intake  - replete electrolytes  - Repeat EKG  - tele monitoring, unremarkable    # DM1 on insulin pump  - seen by Dr. Galvez back in early january. Keep pump settings at: 12am to 12pm 2.65U/hr, 12pm to 4pm 3.4U/hr, 4pm to 12am 3.65U/hr   - can keep her insulin pump for now  - f/u endo consult  - ISS for additional coverage  - encouraged PO intake  - clear liquid diet    # Anxiety  # Depression  - c/w home sertraline    # Asthma  - No documented home inhalers on med list from assisted living    # GERD  # HTN  - c/w home meds     Patient is a 39-year-old female PMH of DM1, anxiety, depression, asthma, GERD, HTN who presents ED with complaints of multiple episodes of emesis with abdominal cramping for the past day.  Patient was recently admitted for syncope and found to be mycoplasma positive and discharged on azithromycin.  Patient endorses an unchanged cough. Patient also endorses decreased p.o. intake. She lives at assisted living facility with her mom who had gastroenteritis symptoms a few days ago. Found to have prolonged qtc on ECG. Patient admitted for prolonged qtc likely secondary to electrolyte imbalanced caused by dehydration from vomiting/diarrhea.      # Prolonged qtc  # Hypokalemia  # Leukocytosis  # Suspected Gastroenteritis (Norovirus?)  # Laryngitis  - EKG NSR with qtc of 605 >> Repeat  ms  - patient endorses diarrhea and vomiting with decreased PO intake.  - Now tolerating diet  - Developed laryngitis on last day of hospitalization. Likely viral, no need for further w/u.  - repleted electrolytes  - tele monitoring, unremarkable  - EP recs appreciated: Loop recorder placement prior to D/C to monitor for arrhythmias     # DM1 on insulin pump  - seen by Dr. Galvez back in early january. Keep pump settings at: 12am to 12pm 2.65U/hr, 12pm to 4pm 3.4U/hr, 4pm to 12am 3.65U/hr   - can keep her insulin pump for now  - f/u endo consult  - ISS for additional coverage  - encouraged PO intake  - clear liquid diet    # Anxiety  # Depression  - c/w home sertraline    # Asthma  - No documented home inhalers on med list from assisted living    # GERD  # HTN  - c/w home meds     Patient is a 39-year-old female PMH of DM1, anxiety, depression, asthma, GERD, HTN who presents ED with complaints of multiple episodes of emesis with abdominal cramping for the past day.  Patient was recently admitted for syncope and found to be mycoplasma positive and discharged on azithromycin.  Patient endorses an unchanged cough. Patient also endorses decreased p.o. intake. She lives at assisted living facility with her mom who had gastroenteritis symptoms a few days ago. Found to have prolonged qtc on ECG. Patient admitted for prolonged qtc likely secondary to electrolyte imbalanced caused by dehydration from vomiting/diarrhea.      # Prolonged QTC  # Hypokalemia  # Leukocytosis  # Suspected Gastroenteritis (Norovirus?)  # Laryngitis  - EKG NSR with qtc of 605 >> Repeat  ms  - patient endorses diarrhea and vomiting with decreased PO intake.  - Now tolerating diet  - Developed laryngitis on last day of hospitalization. Likely viral, no need for further w/u.  - repleted electrolytes  - tele monitoring, unremarkable  - EP recs appreciated: Loop recorder placement prior to D/C to monitor for arrhythmias     # DM1 on insulin pump  - seen by Dr. Galvez back in early january. Keep pump settings at: 12am to 12pm 2.65U/hr, 12pm to 4pm 3.4U/hr, 4pm to 12am 3.65U/hr   - can keep her insulin pump for now  - f/u endo consult  - ISS for additional coverage  - encouraged PO intake  - clear liquid diet    # Anxiety  # Depression  - c/w home sertraline    # Asthma  - No documented home inhalers on med list from assisted living    # GERD  # HTN  - c/w home meds

## 2025-01-27 NOTE — DISCHARGE NOTE PROVIDER - NSDCCPCAREPLAN_GEN_ALL_CORE_FT
PRINCIPAL DISCHARGE DIAGNOSIS  Diagnosis: Prolonged QT interval  Assessment and Plan of Treatment: You came to the hospital with complaints of abdominal pain and vomiting. This was likely caused by a gastrointestinal infeciton. You were also found to have an elevated QTc on EKG. This finding can predispose you to cardiac arrhythmia. We have made adjustments to your medications that may have contributed to this.      SECONDARY DISCHARGE DIAGNOSES  Diagnosis: Nausea and vomiting  Assessment and Plan of Treatment:      PRINCIPAL DISCHARGE DIAGNOSIS  Diagnosis: Prolonged QT interval  Assessment and Plan of Treatment: You came to the hospital with complaints of abdominal pain and vomiting. This was likely caused by a gastrointestinal infeciton. You were also found to have an elevated QTc on EKG. This finding can predispose you to cardiac arrhythmia. We have made adjustments to your medications that may have contributed to this.  You had a loop recorder placed to monitor your heart rhythm. This will help us identify any arrhythmias that may occur.      SECONDARY DISCHARGE DIAGNOSES  Diagnosis: Nausea and vomiting  Assessment and Plan of Treatment:

## 2025-01-27 NOTE — DISCHARGE NOTE PROVIDER - CARE PROVIDER_API CALL
Martha Robertson  Internal Medicine  [address] [place] [id number]  Phone: [phone]  Fax: [phone]  Established Patient  Follow Up Time: 1 week

## 2025-01-27 NOTE — DISCHARGE NOTE PROVIDER - NSDCFUSCHEDAPPT_GEN_ALL_CORE_FT
Amadeo Palafox  [facility] PreAdmits  Scheduled Appointment: 01/28/2025    Amadeo Palafox  [facility]  OPHTHALM  [address]  Scheduled Appointment: 01/28/2025    Martha Robertson  [facility] PreAdmits  Scheduled Appointment: 02/03/2025    Martha Robertson  [facility]  INTMED  [address]  Scheduled Appointment: 02/03/2025    Nely Martinez  [facility]  GASTRO Doc Off [id number]  Scheduled Appointment: 02/10/2025    Jeyson Da Silva  [facility]  NEUROLOGY [address]  Scheduled Appointment: 03/24/2025    José Miguel Troy  [facility]  OTOLARYNG [address]  Scheduled Appointment: 04/11/2025     Martha Robertson  [facility] PreAdmits  Scheduled Appointment: 02/03/2025    Martha Robertson  [facility]  INTMED  [address]  Scheduled Appointment: 02/03/2025    Nely Martinez  [facility]  GASTRO Doc Off [id number]  Scheduled Appointment: 02/10/2025    Jeyson Da Sliva  [facility]  NEUROLOGY [address]  Scheduled Appointment: 03/24/2025    José Miguel Troy  [facility]  [address]ARYNG [address]  Scheduled Appointment: 04/11/2025

## 2025-01-27 NOTE — CONSULT NOTE ADULT - NS ATTEND AMEND GEN_ALL_CORE FT
Prolonged QT    Electrolyte replacement  Avoid QTc prolonging meds  Daily EKG to assess QTc  Syncope appears to be unrelated to Qtc. WIll benefit from long term monitoring ILR/MCOT +ILR  OP f-up

## 2025-01-28 ENCOUNTER — TRANSCRIPTION ENCOUNTER (OUTPATIENT)
Age: 40
End: 2025-01-28

## 2025-01-28 ENCOUNTER — APPOINTMENT (OUTPATIENT)
Dept: OPHTHALMOLOGY | Facility: CLINIC | Age: 40
End: 2025-01-28

## 2025-01-28 VITALS
DIASTOLIC BLOOD PRESSURE: 78 MMHG | SYSTOLIC BLOOD PRESSURE: 117 MMHG | RESPIRATION RATE: 18 BRPM | TEMPERATURE: 98 F | HEART RATE: 73 BPM | OXYGEN SATURATION: 98 %

## 2025-01-28 LAB
ALBUMIN SERPL ELPH-MCNC: 3.7 G/DL — SIGNIFICANT CHANGE UP (ref 3.5–5.2)
ALP SERPL-CCNC: 114 U/L — SIGNIFICANT CHANGE UP (ref 30–115)
ALT FLD-CCNC: 43 U/L — HIGH (ref 0–41)
ANION GAP SERPL CALC-SCNC: 11 MMOL/L — SIGNIFICANT CHANGE UP (ref 7–14)
AST SERPL-CCNC: 35 U/L — SIGNIFICANT CHANGE UP (ref 0–41)
BASOPHILS # BLD AUTO: 0.05 K/UL — SIGNIFICANT CHANGE UP (ref 0–0.2)
BASOPHILS NFR BLD AUTO: 0.6 % — SIGNIFICANT CHANGE UP (ref 0–1)
BILIRUB SERPL-MCNC: 0.2 MG/DL — SIGNIFICANT CHANGE UP (ref 0.2–1.2)
BUN SERPL-MCNC: 5 MG/DL — LOW (ref 10–20)
CALCIUM SERPL-MCNC: 8.9 MG/DL — SIGNIFICANT CHANGE UP (ref 8.4–10.4)
CHLORIDE SERPL-SCNC: 103 MMOL/L — SIGNIFICANT CHANGE UP (ref 98–110)
CO2 SERPL-SCNC: 25 MMOL/L — SIGNIFICANT CHANGE UP (ref 17–32)
CREAT SERPL-MCNC: 0.5 MG/DL — LOW (ref 0.7–1.5)
CULTURE RESULTS: SIGNIFICANT CHANGE UP
CULTURE RESULTS: SIGNIFICANT CHANGE UP
EGFR: 122 ML/MIN/1.73M2 — SIGNIFICANT CHANGE UP
EOSINOPHIL # BLD AUTO: 0.43 K/UL — SIGNIFICANT CHANGE UP (ref 0–0.7)
EOSINOPHIL NFR BLD AUTO: 5.5 % — SIGNIFICANT CHANGE UP (ref 0–8)
GLUCOSE BLDC GLUCOMTR-MCNC: 109 MG/DL — HIGH (ref 70–99)
GLUCOSE BLDC GLUCOMTR-MCNC: 154 MG/DL — HIGH (ref 70–99)
GLUCOSE BLDC GLUCOMTR-MCNC: 165 MG/DL — HIGH (ref 70–99)
GLUCOSE BLDC GLUCOMTR-MCNC: 172 MG/DL — HIGH (ref 70–99)
GLUCOSE BLDC GLUCOMTR-MCNC: 72 MG/DL — SIGNIFICANT CHANGE UP (ref 70–99)
GLUCOSE BLDC GLUCOMTR-MCNC: 83 MG/DL — SIGNIFICANT CHANGE UP (ref 70–99)
GLUCOSE SERPL-MCNC: 97 MG/DL — SIGNIFICANT CHANGE UP (ref 70–99)
HCT VFR BLD CALC: 38.9 % — SIGNIFICANT CHANGE UP (ref 37–47)
HGB BLD-MCNC: 11.7 G/DL — LOW (ref 12–16)
IMM GRANULOCYTES NFR BLD AUTO: 1.3 % — HIGH (ref 0.1–0.3)
LYMPHOCYTES # BLD AUTO: 2.24 K/UL — SIGNIFICANT CHANGE UP (ref 1.2–3.4)
LYMPHOCYTES # BLD AUTO: 28.8 % — SIGNIFICANT CHANGE UP (ref 20.5–51.1)
MAGNESIUM SERPL-MCNC: 2 MG/DL — SIGNIFICANT CHANGE UP (ref 1.8–2.4)
MCHC RBC-ENTMCNC: 24.6 PG — LOW (ref 27–31)
MCHC RBC-ENTMCNC: 30.1 G/DL — LOW (ref 32–37)
MCV RBC AUTO: 81.9 FL — SIGNIFICANT CHANGE UP (ref 81–99)
MONOCYTES # BLD AUTO: 0.61 K/UL — HIGH (ref 0.1–0.6)
MONOCYTES NFR BLD AUTO: 7.9 % — SIGNIFICANT CHANGE UP (ref 1.7–9.3)
NEUTROPHILS # BLD AUTO: 4.34 K/UL — SIGNIFICANT CHANGE UP (ref 1.4–6.5)
NEUTROPHILS NFR BLD AUTO: 55.9 % — SIGNIFICANT CHANGE UP (ref 42.2–75.2)
NRBC # BLD: 0 /100 WBCS — SIGNIFICANT CHANGE UP (ref 0–0)
NRBC BLD-RTO: 0 /100 WBCS — SIGNIFICANT CHANGE UP (ref 0–0)
PLATELET # BLD AUTO: 426 K/UL — HIGH (ref 130–400)
PMV BLD: 10.6 FL — HIGH (ref 7.4–10.4)
POTASSIUM SERPL-MCNC: 4.5 MMOL/L — SIGNIFICANT CHANGE UP (ref 3.5–5)
POTASSIUM SERPL-SCNC: 4.5 MMOL/L — SIGNIFICANT CHANGE UP (ref 3.5–5)
PROT SERPL-MCNC: 6.4 G/DL — SIGNIFICANT CHANGE UP (ref 6–8)
RBC # BLD: 4.75 M/UL — SIGNIFICANT CHANGE UP (ref 4.2–5.4)
RBC # FLD: 24.9 % — HIGH (ref 11.5–14.5)
SODIUM SERPL-SCNC: 139 MMOL/L — SIGNIFICANT CHANGE UP (ref 135–146)
SPECIMEN SOURCE: SIGNIFICANT CHANGE UP
SPECIMEN SOURCE: SIGNIFICANT CHANGE UP
WBC # BLD: 7.77 K/UL — SIGNIFICANT CHANGE UP (ref 4.8–10.8)
WBC # FLD AUTO: 7.77 K/UL — SIGNIFICANT CHANGE UP (ref 4.8–10.8)

## 2025-01-28 PROCEDURE — 93010 ELECTROCARDIOGRAM REPORT: CPT

## 2025-01-28 PROCEDURE — 33285 INSJ SUBQ CAR RHYTHM MNTR: CPT

## 2025-01-28 PROCEDURE — 93010 ELECTROCARDIOGRAM REPORT: CPT | Mod: 77

## 2025-01-28 PROCEDURE — 99239 HOSP IP/OBS DSCHRG MGMT >30: CPT

## 2025-01-28 RX ORDER — SCOPOLAMINE 1 MG/3D
1 PATCH, EXTENDED RELEASE TRANSDERMAL ONCE
Refills: 0 | Status: COMPLETED | OUTPATIENT
Start: 2025-01-28 | End: 2025-01-28

## 2025-01-28 RX ORDER — SULFAMETHOXAZOLE AND TRIMETHOPRIM 400; 80 MG/1; MG/1
1 TABLET ORAL ONCE
Refills: 0 | Status: COMPLETED | OUTPATIENT
Start: 2025-01-28 | End: 2025-01-28

## 2025-01-28 RX ADMIN — ASPIRIN 81 MILLIGRAM(S): 81 TABLET, COATED ORAL at 12:02

## 2025-01-28 RX ADMIN — DOXYCYCLINE HYCLATE 100 MILLIGRAM(S): 100 CAPSULE ORAL at 05:42

## 2025-01-28 RX ADMIN — SULFAMETHOXAZOLE AND TRIMETHOPRIM 1 TABLET(S): 400; 80 TABLET ORAL at 14:18

## 2025-01-28 RX ADMIN — FAMOTIDINE 20 MILLIGRAM(S): 10 INJECTION INTRAVENOUS at 12:02

## 2025-01-28 RX ADMIN — Medication 100 MILLIGRAM(S): at 12:02

## 2025-01-28 RX ADMIN — Medication 25 MILLIGRAM(S): at 15:05

## 2025-01-28 RX ADMIN — ACETAMINOPHEN 650 MILLIGRAM(S): 160 SUSPENSION ORAL at 18:42

## 2025-01-28 RX ADMIN — DOXYCYCLINE HYCLATE 100 MILLIGRAM(S): 100 CAPSULE ORAL at 18:24

## 2025-01-28 RX ADMIN — SCOPOLAMINE 1 PATCH: 1 PATCH, EXTENDED RELEASE TRANSDERMAL at 15:47

## 2025-01-28 RX ADMIN — Medication 25 MILLIGRAM(S): at 05:40

## 2025-01-28 RX ADMIN — Medication 5 MILLIGRAM(S): at 05:41

## 2025-01-28 NOTE — PROGRESS NOTE ADULT - ASSESSMENT
39-year-old female PMH of DM1, anxiety, depression, asthma, GERD, HTN who presents ED with complaints of multiple episodes of emesis with abdominal cramping for the past day.  Patient was recently admitted for syncope and found to be mycoplasma positive and discharged on azithromycin.  Patient endorses an unchanged cough. Patient also endorses decreased p.o. intake. She lives at assisted living facility with her mom who had gastroenteritis symptoms a few days ago. Found to have prolonged qtc on ECG. Patient admitted for prolonged qtc likely secondary to electrolyte imbalanced caused by dehydration from vomiting/diarrhea.      # Prolonged qtc with recent Syncopal episode  # Hypokalemia resolved, now hyperkalemia   # Leukocytosis  # Suspected Gastroenteritis (Norovirus?)  - WBC 11.22  - EKG NSR with qtc of 605  - patient endorses diarrhea and vomiting with decreased PO intake  - K 3.3-->5.3--> lokelma 5 mg   - trend CBC and BMP  - replete electrolytes  - serieal EKGs  - tele monitoring  -  [facility]  - EP consult   - cardiac telemonitoring   - ekg reviewed, still qtc 515, given 2 grams magnesium     #Mycoplasma   - pt was on azithromycin  - Azithro DCed, started on doxy 100 mg bid     # DM1 on insulin pump  - seen by Dr. Galvez back in early january. Keep pump settings at: 12am to 12pm 2.65U/hr, 12pm to 4pm 3.4U/hr, 4pm to 12am 3.65U/hr   - can keep her insulin pump for now  - f/u endo consult  - ISS for additional coverage  - encouraged PO intake  - advance diet     # Anxiety  # Depression  - c/w home sertraline    # Asthma  - No documented home inhalers on med list from assisted living    # GERD  # HTN  - c/w home meds    #Progress Note Handoff  Pending (specify):  follow up EP, cardiac telemonitoring   Family discussion: house staff updated pt family  Disposition: cardiac telemonitoring   Decision to admit the pt is based on acuity as above 
39-year-old female PMH of DM1, anxiety, depression, asthma, GERD, HTN who presents ED with complaints of multiple episodes of emesis with abdominal cramping for the past day.  Patient was recently admitted for syncope and found to be mycoplasma positive and discharged on azithromycin.  Patient endorses an unchanged cough. Patient also endorses decreased p.o. intake. She lives at assisted living facility with her mom who had gastroenteritis symptoms a few days ago. Found to have prolonged qtc on ECG. Patient admitted for prolonged qtc likely secondary to electrolyte imbalanced caused by dehydration from vomiting/diarrhea.      # Prolonged qtc with recent Syncopal episode  # Hypokalemia resolved, now hyperkalemia   # Leukocytosis  # Suspected Gastroenteritis (Norovirus?)  - WBC 11.22  - EKG NSR with qtc of 605  - patient endorses diarrhea and vomiting with decreased PO intake  - K 3.3-->5.3--> lokelma 5 mg   - trend CBC and BMP  - replete electrolytes  - serieal EKGs  - tele monitoring  [facility]  - EP consult   - cardiac telemonitoring   - ekg reviewed, still qtc 515, given 2 grams magnesium     #Mycoplasma   - pt was on azithromycin  - Azithro DCed, started on doxy 100 mg bid     # DM1 on insulin pump  - seen by Dr. Galvez back in early january. Keep pump settings at: 12am to 12pm 2.65U/hr, 12pm to 4pm 3.4U/hr, 4pm to 12am 3.65U/hr   - can keep her insulin pump for now  - f/u endo consult  - ISS for additional coverage  - encouraged PO intake  - advance diet     # Anxiety  # Depression  - c/w home sertraline    # Asthma  - No documented home inhalers on med list from assisted living    # GERD  # HTN  - c/w home meds    #Progress Note Handoff  Pending (specify):  follow up EP, cardiac telemonitoring   Family discussion: house staff updated pt family  Disposition: cardiac telemonitoring   Decision to admit the pt is based on acuity as above

## 2025-01-28 NOTE — PROGRESS NOTE ADULT - SUBJECTIVE AND OBJECTIVE BOX
SUBJECTIVE/OVERNIGHT EVENTS  Today is hospital day 2d. This morning patient was seen and examined at bedside, resting comfortably in bed.  AOx4, vitals wnl on RA. No catheters or lines inserted.  No acute or major events overnight.    HPI:  Patient is a 39-year-old female PMH of DM1, anxiety, depression, asthma, GERD, HTN who presents ED with complaints of multiple episodes of emesis with abdominal cramping for the past day.  Patient was recently admitted for syncope and found to be mycoplasma positive and discharged on azithromycin.  Patient endorses an unchanged cough. Patient also endorses decreased p.o. intake. She lives at assisted living facility with her mom who had gastroenteritis symptoms a few days ago. Denies fever, chills, CP, SOB, diarrhea, urinary symptoms.      in the ED  /79, HR 98, Temp 97.9, SpO2 97% on RA  labs significant for WBC 11.22, K 3.3  EKG NSR with qtc of 605      Patient admitted for prolonged qtc likely secondary to electrolyte imbalanced caused by dehydration from vomiting/diarrhea. (26 Jan 2025 11:45)      VITALS  T(F): 98 (01-28-25 @ 07:58), Max: 98.1 (01-27-25 @ 17:03)  HR: 73 (01-28-25 @ 07:58) (73 - 99)  BP: 117/78 (01-28-25 @ 07:58) (110/69 - 117/78)  RR: 18 (01-28-25 @ 07:58) (18 - 18)  SpO2: 98% (01-28-25 @ 07:58) (98% - 99%)  POCT Blood Glucose.: 72 mg/dL (01-28-25 @ 11:29)  POCT Blood Glucose.: 83 mg/dL (01-28-25 @ 08:22)  POCT Blood Glucose.: 109 mg/dL (01-28-25 @ 01:55)  POCT Blood Glucose.: 69 mg/dL (01-27-25 @ 23:58)  POCT Blood Glucose.: 43 mg/dL (01-27-25 @ 23:14)  POCT Blood Glucose.: 160 mg/dL (01-27-25 @ 17:55)          PHYSICAL EXAM      GENERAL: No acute distress, well-developed  HEAD:  Atraumatic, Normocephalic  ENT: PERRL, conjunctiva and sclera clear, neck supple, no JVD, moist mucosa, posterior oropharynx clear  CHEST/LUNG: Clear to auscultation bilaterally; No wheeze, equal breath sounds bilaterally, respirations nonlabored  HEART: Regular rate and rhythm; No murmurs, rubs, or gallops  ABDOMEN: Soft, nontender, nondistended; Bowel sounds present, no organomegaly  BACK: no spinal tenderness, no CVA tenderness  EXTREMITIES:  No clubbing, cyanosis, or edema  PSYCH: Nl behavior, nl affect  NEUROLOGY: AAOx3, non-focal, moves all extremities spontaneously  SKIN: Normal color, No rashes or lesions        MEDICATIONS  STANDING MEDICATIONS  amLODIPine   Tablet 5 milliGRAM(s) Oral daily  aspirin  chewable 81 milliGRAM(s) Oral daily  atorvastatin 40 milliGRAM(s) Oral at bedtime  dextrose 5%. 1000 milliLiter(s) IV Continuous <Continuous>  dextrose 5%. 1000 milliLiter(s) IV Continuous <Continuous>  dextrose 50% Injectable 25 Gram(s) IV Push once  dextrose 50% Injectable 12.5 Gram(s) IV Push once  dextrose 50% Injectable 25 Gram(s) IV Push once  doxycycline IVPB 100 milliGRAM(s) IV Intermittent every 12 hours  famotidine    Tablet 20 milliGRAM(s) Oral daily  insulin lispro (ADMELOG) corrective regimen sliding scale   SubCutaneous three times a day before meals  lactated ringers. 1000 milliLiter(s) IV Continuous <Continuous>  metoprolol tartrate 25 milliGRAM(s) Oral three times a day  sertraline 100 milliGRAM(s) Oral daily    PRN MEDICATIONS  acetaminophen     Tablet .. 650 milliGRAM(s) Oral every 6 hours PRN  dextrose Oral Gel 15 Gram(s) Oral once PRN        PAST MEDICAL & SURGICAL HISTORY:  Neuropathy  right lower extremity, vaginal      Type 1 diabetes      Degenerative disc disease, thoracic      Urinary tract infection, recurrent      Gastroesophageal reflux disease, esophagitis presence not specified      Intractable headache, unspecified chronicity pattern, unspecified headache type      Tremor of right hand      Tachycardia      Spinal stenosis      No significant past surgical history          LABS             11.7   7.77  )-----------( 426      ( 01-28-25 @ 06:32 )             38.9     139  |  103  |  5   -------------------------<  97   01-28-25 @ 06:32  4.5  |  25  |  0.5    Ca      8.9     01-28-25 @ 06:32  Mg     2.0     01-28-25 @ 06:32    TPro  6.4  /  Alb  3.7  /  TBili  0.2  /  DBili  x   /  AST  35  /  ALT  43  /  AlkPhos  114  /  GGT  x     01-28-25 @ 06:32        Urinalysis Basic - ( 28 Jan 2025 06:32 )    Color: x / Appearance: x / SG: x / pH: x  Gluc: 97 mg/dL / Ketone: x  / Bili: x / Urobili: x   Blood: x / Protein: x / Nitrite: x   Leuk Esterase: x / RBC: x / WBC x   Sq Epi: x / Non Sq Epi: x / Bacteria: x          IMAGING
Patient is a 39y old  Female who presents with a chief complaint of prolonged qtc (01-27-25)      Pt seen and examined at bedside. No CP or SOB.          PAST MEDICAL & SURGICAL HISTORY:  Neuropathy  right lower extremity, vaginal    Type 1 diabetes    Degenerative disc disease, thoracic    Urinary tract infection, recurrent    Gastroesophageal reflux disease, esophagitis presence not specified    Intractable headache, unspecified chronicity pattern, unspecified headache type    Tremor of right hand    Tachycardia    Spinal stenosis    No significant past surgical history        VITAL SIGNS (Last 24 hrs):  T(C): 36.7 (01-27-25 @ 07:27), Max: 36.7 (01-27-25 @ 07:27)  HR: 78 (01-27-25 @ 07:27) (69 - 86)  BP: 125/70 (01-27-25 @ 07:27) (113/46 - 125/70)  RR: 18 (01-27-25 @ 07:27) (18 - 18)  SpO2: 98% (01-27-25 @ 07:27) (98% - 99%)  Wt(kg): --  Daily     Daily     I&O's Summary      PHYSICAL EXAM:  GENERAL: NAD, well-developed  HEAD:  Atraumatic, Normocephalic  EYES: EOMI, PERRLA, conjunctiva and sclera clear  NECK: Supple, No JVD  CHEST/LUNG: Clear to auscultation bilaterally; No wheeze  HEART: Regular rate and rhythm; No murmurs, rubs, or gallops  ABDOMEN: Soft, Nontender, Nondistended; Bowel sounds present  EXTREMITIES:  2+ Peripheral Pulses, No clubbing, cyanosis, or edema  PSYCH: AAOx3  NEUROLOGY: non-focal  SKIN: No rashes or lesions    Labs Reviewed  Spoke to patient in regards to abnormal labs.    CBC Full  -  ( 27 Jan 2025 07:15 )  WBC Count : 8.46 K/uL  Hemoglobin : 12.1 g/dL  Hematocrit : 39.8 %  Platelet Count - Automated : 398 K/uL  Mean Cell Volume : 81.4 fL  Mean Cell Hemoglobin : 24.7 pg  Mean Cell Hemoglobin Concentration : 30.4 g/dL  Auto Neutrophil # : 5.41 K/uL  Auto Lymphocyte # : 2.11 K/uL  Auto Monocyte # : 0.60 K/uL  Auto Eosinophil # : 0.22 K/uL  Auto Basophil # : 0.03 K/uL  Auto Neutrophil % : 63.9 %  Auto Lymphocyte % : 24.9 %  Auto Monocyte % : 7.1 %  Auto Eosinophil % : 2.6 %  Auto Basophil % : 0.4 %    BMP:    01-27 @ 11:33    Blood Urea Nitrogen - <3  Calcium - 9.2  Carbond Dioxide - 25  Chloride - 103  Creatinine - 0.5  Glucose - 186  Potassium - 5.3  Sodium - 140      Hemoglobin A1c -   PT/INR - ( 23 Jan 2025 20:05 )   PT: 14.50 sec;   INR: 1.22 ratio         PTT - ( 23 Jan 2025 20:05 )  PTT:30.8 sec  Urine Culture:  01-22 @ 19:48 Urine culture: --    Culture Results:   No growth at 4 days  Method Type: --  Organism: --  Organism Identification: --  Specimen Source: .Blood BLOOD        COVID Labs  CRP:    Procalcitonin: 0.32 ng/mL (01-23-25 @ 05:20)    D-Dimer:      Imaging reviewed independently and reviewed read        < from: 12 Lead ECG (01.27.25 @ 12:29) >    Diagnosis Line Normal sinus rhythm  Rightward axis  Prolonged QT  Abnormal ECG    < end of copied text >      MEDICATIONS  (STANDING):  amLODIPine   Tablet 5 milliGRAM(s) Oral daily  aspirin  chewable 81 milliGRAM(s) Oral daily  atorvastatin 40 milliGRAM(s) Oral at bedtime  dextrose 5%. 1000 milliLiter(s) (50 mL/Hr) IV Continuous <Continuous>  dextrose 5%. 1000 milliLiter(s) (50 mL/Hr) IV Continuous <Continuous>  dextrose 50% Injectable 25 Gram(s) IV Push once  dextrose 50% Injectable 12.5 Gram(s) IV Push once  dextrose 50% Injectable 25 Gram(s) IV Push once  doxycycline IVPB 100 milliGRAM(s) IV Intermittent every 12 hours  famotidine    Tablet 20 milliGRAM(s) Oral daily  insulin lispro (ADMELOG) corrective regimen sliding scale   SubCutaneous three times a day before meals  lactated ringers. 1000 milliLiter(s) (75 mL/Hr) IV Continuous <Continuous>  metoprolol tartrate 25 milliGRAM(s) Oral three times a day  sertraline 100 milliGRAM(s) Oral daily    MEDICATIONS  (PRN):  dextrose Oral Gel 15 Gram(s) Oral once PRN Blood Glucose LESS THAN 70 milliGRAM(s)/deciliter

## 2025-01-28 NOTE — DISCHARGE NOTE NURSING/CASE MANAGEMENT/SOCIAL WORK - PATIENT PORTAL LINK FT
You can access the FollowMyHealth Patient Portal offered by [facility] by registering at the following website: [email or web address]. By joining [employer]’s FollowMyHealth portal, you will also be able to view your health information using other applications (apps) compatible with our system.

## 2025-01-28 NOTE — ASU PATIENT PROFILE, ADULT - FALL HARM RISK - UNIVERSAL INTERVENTIONS
Bed in lowest position, wheels locked, appropriate side rails in place/Call bell, personal items and telephone in reach/Instruct patient to call for assistance before getting out of bed or chair/Non-slip footwear when patient is out of bed/[place] to call system/Physically safe environment - no spills, clutter or unnecessary equipment/Purposeful Proactive Rounding/Room/bathroom lighting operational, light cord in reach

## 2025-01-28 NOTE — DISCHARGE NOTE NURSING/CASE MANAGEMENT/SOCIAL WORK - FINANCIAL ASSISTANCE
[facility] provides services at a reduced cost to those who are determined to be eligible through [facility]’s financial assistance program. Information regarding [facility]’s financial assistance program can be found by going to [email or web address] or by calling [phone].

## 2025-01-28 NOTE — DISCHARGE NOTE NURSING/CASE MANAGEMENT/SOCIAL WORK - NSDCPEFALRISK_GEN_ALL_CORE
For information on Fall & Injury Prevention, visit: [email or web address] OR  [email or web address] OR  https://www.cdc.gov/steadi/patient.html

## 2025-01-28 NOTE — CHART NOTE - NSCHARTNOTEFT_GEN_A_CORE
Electrophysiology Brief Post-Op Note    I have personally seen and examined the patient.  I agree with the history and physical which I have reviewed and noted any changes below.  01-28-25 @ 14:24    PRE-OP DIAGNOSIS: Palpitations / AF/ Syncope / Cryptogenic CVA    POST-OP DIAGNOSIS:  Syncope    PROCEDURE: Loop Implant    Physician: Dr. Wise  Assistant: TORY Akhtar    ESTIMATED BLOOD LOSS:  2    mL    ANESTHESIA TYPE:  [  ]General Anesthesia  [  ] Sedation  [X  ] Local/Regional    CONDITION  [  ] Critical  [  ] Serious  [  ]Fair  [ X ]Good    SPECIMENS REMOVED (IF APPLICABLE):  none    IMPLANTS (IF APPLICABLE)  Loop Recorder ([employer])    FINDINGS  PLAN OF CARE  - F/U 3-4 weeks  - May remove bandaid tomorrow  - May shower in 48 hours Electrophysiology Brief Post-Op Note    I have personally seen and examined the patient.  I agree with the history and physical which I have reviewed and noted any changes below.  01-28-25 @ 14:24    PRE-OP DIAGNOSIS: Syncope     POST-OP DIAGNOSIS:  Syncope    PROCEDURE: Loop Implant    Physician: Dr. Wise  Assistant: TORY Akhtar    ESTIMATED BLOOD LOSS:  2    mL    ANESTHESIA TYPE:  [  ]General Anesthesia  [  ] Sedation  [X  ] Local/Regional    CONDITION  [  ] Critical  [  ] Serious  [  ]Fair  [ X ]Good    SPECIMENS REMOVED (IF APPLICABLE):  none    IMPLANTS (IF APPLICABLE)  Loop Recorder ([employer])    FINDINGS  PLAN OF CARE  - F/U 3-4 weeks  - May remove bandaid tomorrow  - May shower in 48 hours
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
## 2025-01-30 ENCOUNTER — EMERGENCY (EMERGENCY)
Facility: HOSPITAL | Age: 40
LOS: 0 days | Discharge: HOME | End: 2025-01-30
Attending: EMERGENCY MEDICINE
Payer: MEDICAID

## 2025-01-30 VITALS
SYSTOLIC BLOOD PRESSURE: 127 MMHG | DIASTOLIC BLOOD PRESSURE: 79 MMHG | OXYGEN SATURATION: 98 % | RESPIRATION RATE: 16 BRPM | TEMPERATURE: 98 F | HEIGHT: 64 IN | HEART RATE: 97 BPM

## 2025-01-30 DIAGNOSIS — J15.7 PNEUMONIA DUE TO MYCOPLASMA PNEUMONIAE: ICD-10-CM

## 2025-01-30 DIAGNOSIS — F32.A DEPRESSION, UNSPECIFIED: ICD-10-CM

## 2025-01-30 DIAGNOSIS — R79.89 OTHER SPECIFIED ABNORMAL FINDINGS OF BLOOD CHEMISTRY: ICD-10-CM

## 2025-01-30 DIAGNOSIS — Z88.1 ALLERGY STATUS TO OTHER ANTIBIOTIC AGENTS: ICD-10-CM

## 2025-01-30 DIAGNOSIS — Z88.0 ALLERGY STATUS TO PENICILLIN: ICD-10-CM

## 2025-01-30 DIAGNOSIS — M54.16 RADICULOPATHY, LUMBAR REGION: ICD-10-CM

## 2025-01-30 DIAGNOSIS — G43.909 MIGRAINE, UNSPECIFIED, NOT INTRACTABLE, WITHOUT STATUS MIGRAINOSUS: ICD-10-CM

## 2025-01-30 DIAGNOSIS — Z11.52 ENCOUNTER FOR SCREENING FOR COVID-19: ICD-10-CM

## 2025-01-30 DIAGNOSIS — R05.9 COUGH, UNSPECIFIED: ICD-10-CM

## 2025-01-30 DIAGNOSIS — R09.89 OTHER SPECIFIED SYMPTOMS AND SIGNS INVOLVING THE CIRCULATORY AND RESPIRATORY SYSTEMS: ICD-10-CM

## 2025-01-30 DIAGNOSIS — Z87.440 PERSONAL HISTORY OF URINARY (TRACT) INFECTIONS: ICD-10-CM

## 2025-01-30 DIAGNOSIS — E66.9 OBESITY, UNSPECIFIED: ICD-10-CM

## 2025-01-30 DIAGNOSIS — I10 ESSENTIAL (PRIMARY) HYPERTENSION: ICD-10-CM

## 2025-01-30 DIAGNOSIS — J45.909 UNSPECIFIED ASTHMA, UNCOMPLICATED: ICD-10-CM

## 2025-01-30 DIAGNOSIS — J06.9 ACUTE UPPER RESPIRATORY INFECTION, UNSPECIFIED: ICD-10-CM

## 2025-01-30 DIAGNOSIS — R05.3 CHRONIC COUGH: ICD-10-CM

## 2025-01-30 DIAGNOSIS — E10.40 TYPE 1 DIABETES MELLITUS WITH DIABETIC NEUROPATHY, UNSPECIFIED: ICD-10-CM

## 2025-01-30 DIAGNOSIS — K21.9 GASTRO-ESOPHAGEAL REFLUX DISEASE WITHOUT ESOPHAGITIS: ICD-10-CM

## 2025-01-30 DIAGNOSIS — Z79.4 LONG TERM (CURRENT) USE OF INSULIN: ICD-10-CM

## 2025-01-30 DIAGNOSIS — R55 SYNCOPE AND COLLAPSE: ICD-10-CM

## 2025-01-30 DIAGNOSIS — R41.82 ALTERED MENTAL STATUS, UNSPECIFIED: ICD-10-CM

## 2025-01-30 DIAGNOSIS — E11.9 TYPE 2 DIABETES MELLITUS WITHOUT COMPLICATIONS: ICD-10-CM

## 2025-01-30 DIAGNOSIS — Z88.8 ALLERGY STATUS TO OTHER DRUGS, MEDICAMENTS AND BIOLOGICAL SUBSTANCES: ICD-10-CM

## 2025-01-30 DIAGNOSIS — R09.81 NASAL CONGESTION: ICD-10-CM

## 2025-01-30 DIAGNOSIS — Z87.01 PERSONAL HISTORY OF PNEUMONIA (RECURRENT): ICD-10-CM

## 2025-01-30 DIAGNOSIS — B97.89 OTHER VIRAL AGENTS AS THE CAUSE OF DISEASES CLASSIFIED ELSEWHERE: ICD-10-CM

## 2025-01-30 PROCEDURE — 99284 EMERGENCY DEPT VISIT MOD MDM: CPT

## 2025-01-30 PROCEDURE — 0241U: CPT

## 2025-01-30 PROCEDURE — 99283 EMERGENCY DEPT VISIT LOW MDM: CPT

## 2025-01-30 RX ORDER — BENZONATATE 100 MG
1 CAPSULE ORAL
Qty: 9 | Refills: 0
Start: 2025-01-30 | End: 2025-02-01

## 2025-01-30 NOTE — ED PROVIDER NOTE - ATTENDING APP SHARED VISIT CONTRIBUTION OF CARE
39-year-old female presenting for evaluation of cough and runny nose.  Runny nose started yesterday, patient was recently diagnosed with pneumonia and is taking doxycycline.  No associated fever chills, nausea vomiting, no chest pain or shortness of breath, no change in exercise tolerance or any other additional complaints.  Well-appearing female no acute distress, PERRL, pink conjunctiva, MMM, normal oropharynx, + nasal congestion, speaking full sentences, normal work of breathing, equal air entry, lungs CTA bilaterally, left upper chest wound status post implantable device appears clean and free of infection, patient is awake and alert.  Collateral information obtained from  the patient's mother.  Patient was swabbed, comfortable going home, already has nebulizer treatments for bronchospasm as needed.  Advised to follow-up with her PCP as scheduled, return to emergency room if any worsening/concerning symptoms.  Patient and mom verbalized understanding and are amenable with the plan.

## 2025-01-30 NOTE — ED PROVIDER NOTE - OBJECTIVE STATEMENT
39-year-old female with past medical history  DM, anxiety, depression, asthma, GERD, HTN presenting with persistent cough over the past month.  Was told that she has bronchitis and has been taking antibiotics.  No difficulty breathing.  No fevers or chills.

## 2025-01-30 NOTE — ED PROVIDER NOTE - PATIENT PORTAL LINK FT
You can access the FollowMyHealth Patient Portal offered by [facility] by registering at the following website: [email or web address]. By joining [employer]’s FollowMyHealth portal, you will also be able to view your health information using other applications (apps) compatible with our system.

## 2025-02-03 ENCOUNTER — NON-APPOINTMENT (OUTPATIENT)
Age: 40
End: 2025-02-03

## 2025-02-03 ENCOUNTER — APPOINTMENT (OUTPATIENT)
Dept: INTERNAL MEDICINE | Facility: CLINIC | Age: 40
End: 2025-02-03
Payer: MEDICAID

## 2025-02-03 ENCOUNTER — OUTPATIENT (OUTPATIENT)
Dept: OUTPATIENT SERVICES | Facility: HOSPITAL | Age: 40
LOS: 1 days | End: 2025-02-03
Payer: MEDICAID

## 2025-02-03 VITALS
SYSTOLIC BLOOD PRESSURE: 129 MMHG | OXYGEN SATURATION: 99 % | HEART RATE: 96 BPM | WEIGHT: 185 LBS | TEMPERATURE: 97.6 F | HEIGHT: 64 IN | DIASTOLIC BLOOD PRESSURE: 80 MMHG | BODY MASS INDEX: 31.58 KG/M2

## 2025-02-03 DIAGNOSIS — R74.8 ABNORMAL LEVELS OF OTHER SERUM ENZYMES: ICD-10-CM

## 2025-02-03 DIAGNOSIS — R06.2 WHEEZING: ICD-10-CM

## 2025-02-03 DIAGNOSIS — E55.9 VITAMIN D DEFICIENCY, UNSPECIFIED: ICD-10-CM

## 2025-02-03 DIAGNOSIS — G56.03 CARPAL TUNNEL SYNDROM,BILATERAL UPPER LIMBS: ICD-10-CM

## 2025-02-03 DIAGNOSIS — J45.909 UNSPECIFIED ASTHMA, UNCOMPLICATED: ICD-10-CM

## 2025-02-03 DIAGNOSIS — I10 ESSENTIAL (PRIMARY) HYPERTENSION: ICD-10-CM

## 2025-02-03 DIAGNOSIS — D64.9 ANEMIA, UNSPECIFIED: ICD-10-CM

## 2025-02-03 DIAGNOSIS — R55 SYNCOPE AND COLLAPSE: ICD-10-CM

## 2025-02-03 DIAGNOSIS — E11.9 TYPE 2 DIABETES MELLITUS W/OUT COMPLICATIONS: ICD-10-CM

## 2025-02-03 DIAGNOSIS — Z00.00 ENCOUNTER FOR GENERAL ADULT MEDICAL EXAMINATION WITHOUT ABNORMAL FINDINGS: ICD-10-CM

## 2025-02-03 PROCEDURE — 99214 OFFICE O/P EST MOD 30 MIN: CPT

## 2025-02-03 PROCEDURE — G2211 COMPLEX E/M VISIT ADD ON: CPT | Mod: NC

## 2025-02-03 RX ORDER — ALBUTEROL SULFATE 90 UG/1
108 (90 BASE) INHALANT RESPIRATORY (INHALATION)
Qty: 8.5 | Refills: 1 | Status: ACTIVE | COMMUNITY
Start: 2025-02-03 | End: 1900-01-01

## 2025-02-03 RX ORDER — L. ACIDOPHILUS/L.BULGARICUS 1MM CELL
0.05-0.05 TABLET ORAL
Qty: 30 | Refills: 0 | Status: ACTIVE | COMMUNITY
Start: 2025-02-03 | End: 1900-01-01

## 2025-02-04 ENCOUNTER — NON-APPOINTMENT (OUTPATIENT)
Age: 40
End: 2025-02-04

## 2025-02-05 ENCOUNTER — INPATIENT (INPATIENT)
Facility: HOSPITAL | Age: 40
LOS: 4 days | Discharge: HOME | DRG: 141 | End: 2025-02-10
Attending: HOSPITALIST | Admitting: STUDENT IN AN ORGANIZED HEALTH CARE EDUCATION/TRAINING PROGRAM
Payer: MEDICAID

## 2025-02-05 VITALS
SYSTOLIC BLOOD PRESSURE: 141 MMHG | TEMPERATURE: 98 F | HEART RATE: 94 BPM | OXYGEN SATURATION: 97 % | RESPIRATION RATE: 16 BRPM | HEIGHT: 64 IN | DIASTOLIC BLOOD PRESSURE: 86 MMHG

## 2025-02-05 DIAGNOSIS — E87.5 HYPERKALEMIA: ICD-10-CM

## 2025-02-05 DIAGNOSIS — K21.9 GASTRO-ESOPHAGEAL REFLUX DISEASE WITHOUT ESOPHAGITIS: ICD-10-CM

## 2025-02-05 DIAGNOSIS — Z88.8 ALLERGY STATUS TO OTHER DRUGS, MEDICAMENTS AND BIOLOGICAL SUBSTANCES: ICD-10-CM

## 2025-02-05 DIAGNOSIS — E86.0 DEHYDRATION: ICD-10-CM

## 2025-02-05 DIAGNOSIS — J45.909 UNSPECIFIED ASTHMA, UNCOMPLICATED: ICD-10-CM

## 2025-02-05 DIAGNOSIS — J45.901 UNSPECIFIED ASTHMA WITH (ACUTE) EXACERBATION: ICD-10-CM

## 2025-02-05 DIAGNOSIS — A08.11 ACUTE GASTROENTEROPATHY DUE TO NORWALK AGENT: ICD-10-CM

## 2025-02-05 DIAGNOSIS — E87.6 HYPOKALEMIA: ICD-10-CM

## 2025-02-05 DIAGNOSIS — B96.0 MYCOPLASMA PNEUMONIAE [M. PNEUMONIAE] AS THE CAUSE OF DISEASES CLASSIFIED ELSEWHERE: ICD-10-CM

## 2025-02-05 DIAGNOSIS — R11.2 NAUSEA WITH VOMITING, UNSPECIFIED: ICD-10-CM

## 2025-02-05 DIAGNOSIS — F32.A DEPRESSION, UNSPECIFIED: ICD-10-CM

## 2025-02-05 DIAGNOSIS — J04.0 ACUTE LARYNGITIS: ICD-10-CM

## 2025-02-05 DIAGNOSIS — D72.829 ELEVATED WHITE BLOOD CELL COUNT, UNSPECIFIED: ICD-10-CM

## 2025-02-05 DIAGNOSIS — E66.9 OBESITY, UNSPECIFIED: ICD-10-CM

## 2025-02-05 DIAGNOSIS — Z88.1 ALLERGY STATUS TO OTHER ANTIBIOTIC AGENTS: ICD-10-CM

## 2025-02-05 DIAGNOSIS — I10 ESSENTIAL (PRIMARY) HYPERTENSION: ICD-10-CM

## 2025-02-05 DIAGNOSIS — Z79.82 LONG TERM (CURRENT) USE OF ASPIRIN: ICD-10-CM

## 2025-02-05 DIAGNOSIS — E10.40 TYPE 1 DIABETES MELLITUS WITH DIABETIC NEUROPATHY, UNSPECIFIED: ICD-10-CM

## 2025-02-05 DIAGNOSIS — Z88.0 ALLERGY STATUS TO PENICILLIN: ICD-10-CM

## 2025-02-05 DIAGNOSIS — R94.31 ABNORMAL ELECTROCARDIOGRAM [ECG] [EKG]: ICD-10-CM

## 2025-02-05 LAB
ALBUMIN SERPL ELPH-MCNC: 4.1 G/DL — SIGNIFICANT CHANGE UP (ref 3.5–5.2)
ALP SERPL-CCNC: 156 U/L — HIGH (ref 30–115)
ALT FLD-CCNC: 42 U/L — HIGH (ref 0–41)
ANION GAP SERPL CALC-SCNC: 12 MMOL/L — SIGNIFICANT CHANGE UP (ref 7–14)
AST SERPL-CCNC: 27 U/L — SIGNIFICANT CHANGE UP (ref 0–41)
BASE EXCESS BLDV CALC-SCNC: 5.2 MMOL/L — HIGH (ref -2–3)
BASOPHILS # BLD AUTO: 0.07 K/UL — SIGNIFICANT CHANGE UP (ref 0–0.2)
BASOPHILS NFR BLD AUTO: 0.7 % — SIGNIFICANT CHANGE UP (ref 0–1)
BILIRUB SERPL-MCNC: <0.2 MG/DL — SIGNIFICANT CHANGE UP (ref 0.2–1.2)
BUN SERPL-MCNC: 9 MG/DL — LOW (ref 10–20)
CA-I SERPL-SCNC: 1.31 MMOL/L — SIGNIFICANT CHANGE UP (ref 1.15–1.33)
CALCIUM SERPL-MCNC: 9.6 MG/DL — SIGNIFICANT CHANGE UP (ref 8.4–10.4)
CHLORIDE SERPL-SCNC: 101 MMOL/L — SIGNIFICANT CHANGE UP (ref 98–110)
CO2 SERPL-SCNC: 24 MMOL/L — SIGNIFICANT CHANGE UP (ref 17–32)
CREAT SERPL-MCNC: 0.5 MG/DL — LOW (ref 0.7–1.5)
EGFR: 122 ML/MIN/1.73M2 — SIGNIFICANT CHANGE UP
EOSINOPHIL # BLD AUTO: 0.18 K/UL — SIGNIFICANT CHANGE UP (ref 0–0.7)
EOSINOPHIL NFR BLD AUTO: 1.7 % — SIGNIFICANT CHANGE UP (ref 0–8)
GAS PNL BLDV: 133 MMOL/L — LOW (ref 136–145)
GAS PNL BLDV: SIGNIFICANT CHANGE UP
GAS PNL BLDV: SIGNIFICANT CHANGE UP
GLUCOSE SERPL-MCNC: 102 MG/DL — HIGH (ref 70–99)
HCO3 BLDV-SCNC: 30 MMOL/L — HIGH (ref 22–29)
HCT VFR BLD CALC: 39.7 % — SIGNIFICANT CHANGE UP (ref 37–47)
HCT VFR BLDA CALC: 39 % — SIGNIFICANT CHANGE UP (ref 34.5–46.5)
HGB BLD CALC-MCNC: 13.1 G/DL — SIGNIFICANT CHANGE UP (ref 11.7–16.1)
HGB BLD-MCNC: 12.3 G/DL — SIGNIFICANT CHANGE UP (ref 12–16)
IMM GRANULOCYTES NFR BLD AUTO: 0.7 % — HIGH (ref 0.1–0.3)
LACTATE BLDV-MCNC: 1.4 MMOL/L — SIGNIFICANT CHANGE UP (ref 0.5–2)
LYMPHOCYTES # BLD AUTO: 2.4 K/UL — SIGNIFICANT CHANGE UP (ref 1.2–3.4)
LYMPHOCYTES # BLD AUTO: 22.4 % — SIGNIFICANT CHANGE UP (ref 20.5–51.1)
MCHC RBC-ENTMCNC: 25.1 PG — LOW (ref 27–31)
MCHC RBC-ENTMCNC: 31 G/DL — LOW (ref 32–37)
MCV RBC AUTO: 80.9 FL — LOW (ref 81–99)
MONOCYTES # BLD AUTO: 0.69 K/UL — HIGH (ref 0.1–0.6)
MONOCYTES NFR BLD AUTO: 6.4 % — SIGNIFICANT CHANGE UP (ref 1.7–9.3)
NEUTROPHILS # BLD AUTO: 7.3 K/UL — HIGH (ref 1.4–6.5)
NEUTROPHILS NFR BLD AUTO: 68.1 % — SIGNIFICANT CHANGE UP (ref 42.2–75.2)
NRBC # BLD: 0 /100 WBCS — SIGNIFICANT CHANGE UP (ref 0–0)
NRBC BLD-RTO: 0 /100 WBCS — SIGNIFICANT CHANGE UP (ref 0–0)
PCO2 BLDV: 43 MMHG — HIGH (ref 39–42)
PH BLDV: 7.45 — HIGH (ref 7.32–7.43)
PLATELET # BLD AUTO: 575 K/UL — HIGH (ref 130–400)
PMV BLD: 10.1 FL — SIGNIFICANT CHANGE UP (ref 7.4–10.4)
PO2 BLDV: 44 MMHG — SIGNIFICANT CHANGE UP (ref 25–45)
POTASSIUM BLDV-SCNC: 3.8 MMOL/L — SIGNIFICANT CHANGE UP (ref 3.5–5.1)
POTASSIUM SERPL-MCNC: 4.1 MMOL/L — SIGNIFICANT CHANGE UP (ref 3.5–5)
POTASSIUM SERPL-SCNC: 4.1 MMOL/L — SIGNIFICANT CHANGE UP (ref 3.5–5)
PROT SERPL-MCNC: 7.1 G/DL — SIGNIFICANT CHANGE UP (ref 6–8)
RBC # BLD: 4.91 M/UL — SIGNIFICANT CHANGE UP (ref 4.2–5.4)
RBC # FLD: 23.9 % — HIGH (ref 11.5–14.5)
SAO2 % BLDV: 72.7 % — SIGNIFICANT CHANGE UP (ref 67–88)
SODIUM SERPL-SCNC: 137 MMOL/L — SIGNIFICANT CHANGE UP (ref 135–146)
WBC # BLD: 10.71 K/UL — SIGNIFICANT CHANGE UP (ref 4.8–10.8)
WBC # FLD AUTO: 10.71 K/UL — SIGNIFICANT CHANGE UP (ref 4.8–10.8)

## 2025-02-05 PROCEDURE — 99285 EMERGENCY DEPT VISIT HI MDM: CPT

## 2025-02-05 PROCEDURE — 83036 HEMOGLOBIN GLYCOSYLATED A1C: CPT

## 2025-02-05 PROCEDURE — 93005 ELECTROCARDIOGRAM TRACING: CPT

## 2025-02-05 PROCEDURE — 83735 ASSAY OF MAGNESIUM: CPT

## 2025-02-05 PROCEDURE — 84295 ASSAY OF SERUM SODIUM: CPT

## 2025-02-05 PROCEDURE — 84132 ASSAY OF SERUM POTASSIUM: CPT

## 2025-02-05 PROCEDURE — 85014 HEMATOCRIT: CPT

## 2025-02-05 PROCEDURE — 82803 BLOOD GASES ANY COMBINATION: CPT

## 2025-02-05 PROCEDURE — 94660 CPAP INITIATION&MGMT: CPT

## 2025-02-05 PROCEDURE — 87449 NOS EACH ORGANISM AG IA: CPT

## 2025-02-05 PROCEDURE — 36415 COLL VENOUS BLD VENIPUNCTURE: CPT

## 2025-02-05 PROCEDURE — 80307 DRUG TEST PRSMV CHEM ANLYZR: CPT

## 2025-02-05 PROCEDURE — 84145 PROCALCITONIN (PCT): CPT

## 2025-02-05 PROCEDURE — 83605 ASSAY OF LACTIC ACID: CPT

## 2025-02-05 PROCEDURE — 94640 AIRWAY INHALATION TREATMENT: CPT

## 2025-02-05 PROCEDURE — 99223 1ST HOSP IP/OBS HIGH 75: CPT

## 2025-02-05 PROCEDURE — 80354 DRUG SCREENING FENTANYL: CPT

## 2025-02-05 PROCEDURE — 84443 ASSAY THYROID STIM HORMONE: CPT

## 2025-02-05 PROCEDURE — 82010 KETONE BODYS QUAN: CPT

## 2025-02-05 PROCEDURE — 82962 GLUCOSE BLOOD TEST: CPT

## 2025-02-05 PROCEDURE — 80053 COMPREHEN METABOLIC PANEL: CPT

## 2025-02-05 PROCEDURE — 80061 LIPID PANEL: CPT

## 2025-02-05 PROCEDURE — 85027 COMPLETE CBC AUTOMATED: CPT

## 2025-02-05 PROCEDURE — 87899 AGENT NOS ASSAY W/OPTIC: CPT

## 2025-02-05 PROCEDURE — 95819 EEG AWAKE AND ASLEEP: CPT

## 2025-02-05 PROCEDURE — 71046 X-RAY EXAM CHEST 2 VIEWS: CPT | Mod: 26

## 2025-02-05 PROCEDURE — 0225U NFCT DS DNA&RNA 21 SARSCOV2: CPT

## 2025-02-05 PROCEDURE — 93010 ELECTROCARDIOGRAM REPORT: CPT

## 2025-02-05 PROCEDURE — 85025 COMPLETE CBC W/AUTO DIFF WBC: CPT

## 2025-02-05 PROCEDURE — 82330 ASSAY OF CALCIUM: CPT

## 2025-02-05 PROCEDURE — 93290 INTERROG DEV EVAL ICPMS IP: CPT

## 2025-02-05 PROCEDURE — 93010 ELECTROCARDIOGRAM REPORT: CPT | Mod: 76,77

## 2025-02-05 PROCEDURE — 71045 X-RAY EXAM CHEST 1 VIEW: CPT

## 2025-02-05 PROCEDURE — 80048 BASIC METABOLIC PNL TOTAL CA: CPT

## 2025-02-05 PROCEDURE — 85018 HEMOGLOBIN: CPT

## 2025-02-05 PROCEDURE — 80346 BENZODIAZEPINES1-12: CPT

## 2025-02-05 RX ORDER — PREDNISONE 5 MG/1
60 TABLET ORAL ONCE
Refills: 0 | Status: COMPLETED | OUTPATIENT
Start: 2025-02-05 | End: 2025-02-05

## 2025-02-05 RX ORDER — IPRATROPIUM BROMIDE AND ALBUTEROL SULFATE .5; 2.5 MG/3ML; MG/3ML
3 SOLUTION RESPIRATORY (INHALATION) ONCE
Refills: 0 | Status: COMPLETED | OUTPATIENT
Start: 2025-02-05 | End: 2025-02-05

## 2025-02-05 RX ORDER — MAGNESIUM SULFATE 0.8 MEQ/ML
2 AMPUL (ML) INJECTION ONCE
Refills: 0 | Status: COMPLETED | OUTPATIENT
Start: 2025-02-05 | End: 2025-02-05

## 2025-02-05 RX ADMIN — PREDNISONE 60 MILLIGRAM(S): 5 TABLET ORAL at 20:05

## 2025-02-05 RX ADMIN — IPRATROPIUM BROMIDE AND ALBUTEROL SULFATE 3 MILLILITER(S): .5; 2.5 SOLUTION RESPIRATORY (INHALATION) at 20:15

## 2025-02-05 RX ADMIN — Medication 150 GRAM(S): at 21:49

## 2025-02-05 RX ADMIN — IPRATROPIUM BROMIDE AND ALBUTEROL SULFATE 3 MILLILITER(S): .5; 2.5 SOLUTION RESPIRATORY (INHALATION) at 20:48

## 2025-02-05 RX ADMIN — IPRATROPIUM BROMIDE AND ALBUTEROL SULFATE 3 MILLILITER(S): .5; 2.5 SOLUTION RESPIRATORY (INHALATION) at 20:05

## 2025-02-05 NOTE — ED PROVIDER NOTE - CLINICAL SUMMARY MEDICAL DECISION MAKING FREE TEXT BOX
Patient presents with worsening cough.  Positive rales and wheezing noted on exam.  Labs EKG x-ray time.  Prolonged QTc.  Mag given.  Labs and steroids given.  Patient remains symptomatic.  Wheezing has not improved.  Admitted for further management. Labs and EKG were ordered and reviewed.  Imaging was ordered and reviewed by me.  Appropriate medications for patient's presenting complaints were ordered and effects were reassessed.  Patient's records (prior hospital, ED visit, and/or nursing home notes if available) were reviewed.  Additional history was obtained from EMS, family, and/or PCP (where available).  Escalation to admission/observation was considered.  Patient requires inpatient hospitalization - monitored setting.

## 2025-02-05 NOTE — ED PROVIDER NOTE - ATTENDING APP SHARED VISIT CONTRIBUTION OF CARE
39 year old female, past medical history htn, dm, asthma, gerd, depression Presents with worsening cough.  Patient was recently admitted to the hospital for pneumonia.  Was found to have a prolonged QTc at that time and a loop recorder was placed on 28 January.  Patient reports that since discharge he still remains symptomatic.  Reports episodes of nausea vomiting worse after coughing.  Reports a chest tightness sensation and heart racing.  No fevers or chills.  Went to urgent care and was prescribed doxycycline which she took.  symptoms are not going away so came in for evaluation.    CONSTITUTIONAL: Well-developed; well-nourished; in no acute distress.   SKIN: warm, dry  HEAD: Normocephalic; atraumatic.  EYES: PERRL, EOMI, no conjunctival erythema  ENT: No nasal discharge; airway clear.  NECK: Supple; non tender.  CARD: S1, S2 normal;  Regular rate and rhythm.   RESP:+ wheezing and rales bilaterally. no respiratory distress.   ABD: soft non tender, non distended, no rebound or guarding  EXT: Normal ROM.  5/5 strength in all 4 extremities   LYMPH: No acute cervical adenopathy.  NEURO: Alert, oriented, grossly unremarkable. neurovascularly intact  PSYCH: Cooperative, appropriate.

## 2025-02-05 NOTE — ED PROVIDER NOTE - OBJECTIVE STATEMENT
39 year old female, past medical history htn, dm, asthma, gerd, depression, who presents with shortness of breath and cough. patient with cough and shortness of breath that worsened x1 week. patient reports associated post-tussive emesis. denies f/c, chest pain, hemoptysis, diarrhea, leg pain/swelling,

## 2025-02-05 NOTE — ED ADULT TRIAGE NOTE - CHIEF COMPLAINT QUOTE
BIBA from [facility]  had loop recorder placed 1/28 due to prolonged qtc   has had cough with chest pain & recent dx of bronchitis & vomiting since

## 2025-02-05 NOTE — ED PROVIDER NOTE - PHYSICAL EXAMINATION
CONSTITUTIONAL: Well-developed; well-nourished; in no acute distress, nontoxic appearing  SKIN: skin exam is warm and dry  EYES: PERRL, conjunctiva and sclera clear.  ENT: MMM   CARD: S1, S2 normal, no murmur  RESP: wheezing and rhonchi throughout lung fields, no increased wob, speaking full sentences  ABD: soft; non-distended; non-tender.    EXT: Normal ROM.    NEURO: awake, alert, following commands, oriented, grossly unremarkable. No Focal deficits. GCS 15.   PSYCH: Cooperative, appropriate.

## 2025-02-06 LAB
A1C WITH ESTIMATED AVERAGE GLUCOSE RESULT: 7.3 % — HIGH (ref 4–5.6)
ALBUMIN SERPL ELPH-MCNC: 4.3 G/DL — SIGNIFICANT CHANGE UP (ref 3.5–5.2)
ALP SERPL-CCNC: 156 U/L — HIGH (ref 30–115)
ALT FLD-CCNC: 40 U/L — SIGNIFICANT CHANGE UP (ref 0–41)
ANION GAP SERPL CALC-SCNC: 14 MMOL/L — SIGNIFICANT CHANGE UP (ref 7–14)
ANION GAP SERPL CALC-SCNC: 14 MMOL/L — SIGNIFICANT CHANGE UP (ref 7–14)
AST SERPL-CCNC: 23 U/L — SIGNIFICANT CHANGE UP (ref 0–41)
B-OH-BUTYR SERPL-SCNC: 0.3 MMOL/L — SIGNIFICANT CHANGE UP
BASOPHILS # BLD AUTO: 0.02 K/UL — SIGNIFICANT CHANGE UP (ref 0–0.2)
BASOPHILS NFR BLD AUTO: 0.2 % — SIGNIFICANT CHANGE UP (ref 0–1)
BILIRUB SERPL-MCNC: 0.3 MG/DL — SIGNIFICANT CHANGE UP (ref 0.2–1.2)
BUN SERPL-MCNC: 10 MG/DL — SIGNIFICANT CHANGE UP (ref 10–20)
BUN SERPL-MCNC: 12 MG/DL — SIGNIFICANT CHANGE UP (ref 10–20)
CALCIUM SERPL-MCNC: 9 MG/DL — SIGNIFICANT CHANGE UP (ref 8.4–10.5)
CALCIUM SERPL-MCNC: 9.1 MG/DL — SIGNIFICANT CHANGE UP (ref 8.4–10.5)
CHLORIDE SERPL-SCNC: 101 MMOL/L — SIGNIFICANT CHANGE UP (ref 98–110)
CHLORIDE SERPL-SCNC: 102 MMOL/L — SIGNIFICANT CHANGE UP (ref 98–110)
CHOLEST SERPL-MCNC: 126 MG/DL — SIGNIFICANT CHANGE UP
CO2 SERPL-SCNC: 19 MMOL/L — SIGNIFICANT CHANGE UP (ref 17–32)
CO2 SERPL-SCNC: 19 MMOL/L — SIGNIFICANT CHANGE UP (ref 17–32)
CREAT SERPL-MCNC: 0.5 MG/DL — LOW (ref 0.7–1.5)
CREAT SERPL-MCNC: 0.5 MG/DL — LOW (ref 0.7–1.5)
EGFR: 122 ML/MIN/1.73M2 — SIGNIFICANT CHANGE UP
EGFR: 122 ML/MIN/1.73M2 — SIGNIFICANT CHANGE UP
EOSINOPHIL # BLD AUTO: 0.01 K/UL — SIGNIFICANT CHANGE UP (ref 0–0.7)
EOSINOPHIL NFR BLD AUTO: 0.1 % — SIGNIFICANT CHANGE UP (ref 0–8)
ESTIMATED AVERAGE GLUCOSE: 163 MG/DL — HIGH (ref 68–114)
GAS PNL BLDA: SIGNIFICANT CHANGE UP
GAS PNL BLDA: SIGNIFICANT CHANGE UP
GLUCOSE BLDC GLUCOMTR-MCNC: 257 MG/DL — HIGH (ref 70–99)
GLUCOSE BLDC GLUCOMTR-MCNC: 279 MG/DL — HIGH (ref 70–99)
GLUCOSE BLDC GLUCOMTR-MCNC: 295 MG/DL — HIGH (ref 70–99)
GLUCOSE BLDC GLUCOMTR-MCNC: 335 MG/DL — HIGH (ref 70–99)
GLUCOSE BLDC GLUCOMTR-MCNC: 352 MG/DL — HIGH (ref 70–99)
GLUCOSE BLDC GLUCOMTR-MCNC: 361 MG/DL — HIGH (ref 70–99)
GLUCOSE BLDC GLUCOMTR-MCNC: 369 MG/DL — HIGH (ref 70–99)
GLUCOSE SERPL-MCNC: 311 MG/DL — HIGH (ref 70–99)
GLUCOSE SERPL-MCNC: 396 MG/DL — HIGH (ref 70–99)
HCT VFR BLD CALC: 40.4 % — SIGNIFICANT CHANGE UP (ref 37–47)
HDLC SERPL-MCNC: 49 MG/DL — LOW
HGB BLD-MCNC: 12.6 G/DL — SIGNIFICANT CHANGE UP (ref 12–16)
IMM GRANULOCYTES NFR BLD AUTO: 0.8 % — HIGH (ref 0.1–0.3)
LIPID PNL WITH DIRECT LDL SERPL: 67 MG/DL — SIGNIFICANT CHANGE UP
LYMPHOCYTES # BLD AUTO: 0.91 K/UL — LOW (ref 1.2–3.4)
LYMPHOCYTES # BLD AUTO: 6.9 % — LOW (ref 20.5–51.1)
MAGNESIUM SERPL-MCNC: 2.2 MG/DL — SIGNIFICANT CHANGE UP (ref 1.8–2.4)
MCHC RBC-ENTMCNC: 25.2 PG — LOW (ref 27–31)
MCHC RBC-ENTMCNC: 31.2 G/DL — LOW (ref 32–37)
MCV RBC AUTO: 80.8 FL — LOW (ref 81–99)
MONOCYTES # BLD AUTO: 0.07 K/UL — LOW (ref 0.1–0.6)
MONOCYTES NFR BLD AUTO: 0.5 % — LOW (ref 1.7–9.3)
NEUTROPHILS # BLD AUTO: 12.02 K/UL — HIGH (ref 1.4–6.5)
NEUTROPHILS NFR BLD AUTO: 91.5 % — HIGH (ref 42.2–75.2)
NON HDL CHOLESTEROL: 77 MG/DL — SIGNIFICANT CHANGE UP
NRBC # BLD: 0 /100 WBCS — SIGNIFICANT CHANGE UP (ref 0–0)
NRBC BLD-RTO: 0 /100 WBCS — SIGNIFICANT CHANGE UP (ref 0–0)
PLATELET # BLD AUTO: 599 K/UL — HIGH (ref 130–400)
PMV BLD: 10.6 FL — HIGH (ref 7.4–10.4)
POTASSIUM SERPL-MCNC: 4.9 MMOL/L — SIGNIFICANT CHANGE UP (ref 3.5–5)
POTASSIUM SERPL-MCNC: 5.4 MMOL/L — HIGH (ref 3.5–5)
POTASSIUM SERPL-SCNC: 4.9 MMOL/L — SIGNIFICANT CHANGE UP (ref 3.5–5)
POTASSIUM SERPL-SCNC: 5.4 MMOL/L — HIGH (ref 3.5–5)
PROCALCITONIN SERPL-MCNC: <0.02 NG/ML — SIGNIFICANT CHANGE UP (ref 0.02–0.1)
PROT SERPL-MCNC: 7.2 G/DL — SIGNIFICANT CHANGE UP (ref 6–8)
RBC # BLD: 5 M/UL — SIGNIFICANT CHANGE UP (ref 4.2–5.4)
RBC # FLD: 23.9 % — HIGH (ref 11.5–14.5)
SODIUM SERPL-SCNC: 134 MMOL/L — LOW (ref 135–146)
SODIUM SERPL-SCNC: 135 MMOL/L — SIGNIFICANT CHANGE UP (ref 135–146)
TRIGL SERPL-MCNC: 43 MG/DL — SIGNIFICANT CHANGE UP
TSH SERPL-MCNC: 0.39 UIU/ML — SIGNIFICANT CHANGE UP (ref 0.27–4.2)
WBC # BLD: 13.13 K/UL — HIGH (ref 4.8–10.8)
WBC # FLD AUTO: 13.13 K/UL — HIGH (ref 4.8–10.8)

## 2025-02-06 PROCEDURE — 99233 SBSQ HOSP IP/OBS HIGH 50: CPT

## 2025-02-06 PROCEDURE — 71045 X-RAY EXAM CHEST 1 VIEW: CPT | Mod: 26

## 2025-02-06 PROCEDURE — 99291 CRITICAL CARE FIRST HOUR: CPT | Mod: 25

## 2025-02-06 PROCEDURE — 93010 ELECTROCARDIOGRAM REPORT: CPT

## 2025-02-06 PROCEDURE — 93291 INTERROG DEV EVAL SCRMS IP: CPT | Mod: 26

## 2025-02-06 PROCEDURE — 99252 IP/OBS CONSLTJ NEW/EST SF 35: CPT

## 2025-02-06 RX ORDER — RIMEGEPANT SULFATE 75 MG/75MG
1 TABLET, ORALLY DISINTEGRATING ORAL
Refills: 0 | DISCHARGE

## 2025-02-06 RX ORDER — METHYLPREDNISOLONE ACETATE 40 MG/ML
40 VIAL (ML) INJECTION EVERY 12 HOURS
Refills: 0 | Status: DISCONTINUED | OUTPATIENT
Start: 2025-02-06 | End: 2025-02-06

## 2025-02-06 RX ORDER — ALBUTEROL 90 MCG
2 AEROSOL REFILL (GRAM) INHALATION EVERY 6 HOURS
Refills: 0 | Status: DISCONTINUED | OUTPATIENT
Start: 2025-02-06 | End: 2025-02-10

## 2025-02-06 RX ORDER — FAMOTIDINE 10 MG/ML
1 INJECTION INTRAVENOUS
Refills: 0 | DISCHARGE

## 2025-02-06 RX ORDER — SODIUM CHLORIDE 9 G/ML
1000 INJECTION, SOLUTION INTRAVENOUS
Refills: 0 | Status: DISCONTINUED | OUTPATIENT
Start: 2025-02-06 | End: 2025-02-10

## 2025-02-06 RX ORDER — DM/PSEUDOEPHED/ACETAMINOPHEN 10-30-250
12.5 CAPSULE ORAL ONCE
Refills: 0 | Status: DISCONTINUED | OUTPATIENT
Start: 2025-02-06 | End: 2025-02-07

## 2025-02-06 RX ORDER — DM/PSEUDOEPHED/ACETAMINOPHEN 10-30-250
25 CAPSULE ORAL ONCE
Refills: 0 | Status: DISCONTINUED | OUTPATIENT
Start: 2025-02-06 | End: 2025-02-07

## 2025-02-06 RX ORDER — INSULIN GLARGINE-YFGN 100 [IU]/ML
30 INJECTION, SOLUTION SUBCUTANEOUS ONCE
Refills: 0 | Status: COMPLETED | OUTPATIENT
Start: 2025-02-06 | End: 2025-02-06

## 2025-02-06 RX ORDER — METOPROLOL SUCCINATE 25 MG
25 TABLET, EXTENDED RELEASE 24 HR ORAL THREE TIMES A DAY
Refills: 0 | Status: DISCONTINUED | OUTPATIENT
Start: 2025-02-06 | End: 2025-02-10

## 2025-02-06 RX ORDER — GLUCAGON 3 MG/1
1 POWDER NASAL ONCE
Refills: 0 | Status: DISCONTINUED | OUTPATIENT
Start: 2025-02-06 | End: 2025-02-10

## 2025-02-06 RX ORDER — IPRATROPIUM BROMIDE AND ALBUTEROL SULFATE .5; 2.5 MG/3ML; MG/3ML
3 SOLUTION RESPIRATORY (INHALATION) EVERY 6 HOURS
Refills: 0 | Status: DISCONTINUED | OUTPATIENT
Start: 2025-02-06 | End: 2025-02-10

## 2025-02-06 RX ORDER — AMLODIPINE BESYLATE 5 MG
5 TABLET ORAL DAILY
Refills: 0 | Status: DISCONTINUED | OUTPATIENT
Start: 2025-02-06 | End: 2025-02-10

## 2025-02-06 RX ORDER — INSULIN GLARGINE-YFGN 100 [IU]/ML
20 INJECTION, SOLUTION SUBCUTANEOUS AT BEDTIME
Refills: 0 | Status: DISCONTINUED | OUTPATIENT
Start: 2025-02-06 | End: 2025-02-07

## 2025-02-06 RX ORDER — ATORVASTATIN CALCIUM 80 MG/1
1 TABLET, FILM COATED ORAL
Refills: 0 | DISCHARGE

## 2025-02-06 RX ORDER — PANTOPRAZOLE 20 MG/1
40 TABLET, DELAYED RELEASE ORAL
Refills: 0 | Status: DISCONTINUED | OUTPATIENT
Start: 2025-02-06 | End: 2025-02-10

## 2025-02-06 RX ORDER — PREDNISONE 5 MG/1
40 TABLET ORAL DAILY
Refills: 0 | Status: DISCONTINUED | OUTPATIENT
Start: 2025-02-07 | End: 2025-02-08

## 2025-02-06 RX ORDER — ATORVASTATIN CALCIUM 80 MG/1
40 TABLET, FILM COATED ORAL AT BEDTIME
Refills: 0 | Status: DISCONTINUED | OUTPATIENT
Start: 2025-02-06 | End: 2025-02-10

## 2025-02-06 RX ORDER — METHYLPREDNISOLONE ACETATE 40 MG/ML
40 VIAL (ML) INJECTION EVERY 8 HOURS
Refills: 0 | Status: DISCONTINUED | OUTPATIENT
Start: 2025-02-06 | End: 2025-02-06

## 2025-02-06 RX ORDER — INSULIN LISPRO 100/ML
10 VIAL (ML) SUBCUTANEOUS
Refills: 0 | Status: DISCONTINUED | OUTPATIENT
Start: 2025-02-06 | End: 2025-02-07

## 2025-02-06 RX ORDER — ASPIRIN 81 MG/1
81 TABLET, COATED ORAL DAILY
Refills: 0 | Status: DISCONTINUED | OUTPATIENT
Start: 2025-02-06 | End: 2025-02-10

## 2025-02-06 RX ORDER — INSULIN GLARGINE-YFGN 100 [IU]/ML
10 INJECTION, SOLUTION SUBCUTANEOUS EVERY MORNING
Refills: 0 | Status: DISCONTINUED | OUTPATIENT
Start: 2025-02-07 | End: 2025-02-07

## 2025-02-06 RX ORDER — FERROUS SULFATE 325(65) MG
1 TABLET ORAL
Refills: 0 | DISCHARGE

## 2025-02-06 RX ORDER — DOXYCYCLINE HYCLATE 100 MG/1
100 CAPSULE ORAL EVERY 12 HOURS
Refills: 0 | Status: DISCONTINUED | OUTPATIENT
Start: 2025-02-06 | End: 2025-02-06

## 2025-02-06 RX ORDER — INSULIN LISPRO 100/ML
VIAL (ML) SUBCUTANEOUS
Refills: 0 | Status: DISCONTINUED | OUTPATIENT
Start: 2025-02-06 | End: 2025-02-07

## 2025-02-06 RX ORDER — SERTRALINE HCL 100 MG
100 TABLET ORAL DAILY
Refills: 0 | Status: DISCONTINUED | OUTPATIENT
Start: 2025-02-06 | End: 2025-02-10

## 2025-02-06 RX ORDER — DM/PSEUDOEPHED/ACETAMINOPHEN 10-30-250
15 CAPSULE ORAL ONCE
Refills: 0 | Status: DISCONTINUED | OUTPATIENT
Start: 2025-02-06 | End: 2025-02-07

## 2025-02-06 RX ADMIN — INSULIN GLARGINE-YFGN 20 UNIT(S): 100 INJECTION, SOLUTION SUBCUTANEOUS at 22:06

## 2025-02-06 RX ADMIN — Medication 3: at 17:13

## 2025-02-06 RX ADMIN — Medication 8 UNIT(S): at 12:00

## 2025-02-06 RX ADMIN — Medication 2 MILLIGRAM(S): at 10:23

## 2025-02-06 RX ADMIN — Medication 25 MILLIGRAM(S): at 22:06

## 2025-02-06 RX ADMIN — Medication 10 UNIT(S): at 17:13

## 2025-02-06 RX ADMIN — INSULIN GLARGINE-YFGN 30 UNIT(S): 100 INJECTION, SOLUTION SUBCUTANEOUS at 17:15

## 2025-02-06 RX ADMIN — Medication 40 MILLIGRAM(S): at 06:24

## 2025-02-06 RX ADMIN — Medication 5 MILLIGRAM(S): at 08:12

## 2025-02-06 RX ADMIN — DOXYCYCLINE HYCLATE 100 MILLIGRAM(S): 100 CAPSULE ORAL at 06:21

## 2025-02-06 RX ADMIN — ATORVASTATIN CALCIUM 40 MILLIGRAM(S): 80 TABLET, FILM COATED ORAL at 22:06

## 2025-02-06 RX ADMIN — IPRATROPIUM BROMIDE AND ALBUTEROL SULFATE 3 MILLILITER(S): .5; 2.5 SOLUTION RESPIRATORY (INHALATION) at 08:12

## 2025-02-06 NOTE — H&P ADULT - ASSESSMENT
39 year old female with PMH HTN, DM, GERD, Asthma, MDD presenting for shortness of breath and cough that worsened over the last week.   Denies fevers/chills, abdominal pain, diarrhea/constipation or urinary sx.     #Asthma exacerbation   * Recent RSV/Flu, possible post-viral bronchitis?  - Vitals in the ED: /86, HR 94, T 98.5, RR 16, SpO2 97% on 15L non rebreather => 96% on room air  - Labs: WBC 10, Hgb 12.3, electrolytes within normal, alk phos 156, ALT 42  - VBG: pH 7.45, pCO2 43, pO2 44, HCO3 30  - s/p Duoneb, Mg 2g and prednisone 60mg PO once in the ED   - Duoneb q6 and PRN  - Follow up RVP, procal, urine legionella, urine strep   - Continue Solumedrol 40mg q8h   - Start Doxy       #Prolonged QTc   - EKG: sinus tachy 110, QTc 514  - Avoid QT prolonging meds  - Repeat EKG in AM    #HTN  - continue home meds     #DM  - seen by Dr. Galvez back in early january. Keep pump settings at: 12am to 12pm 2.65U/hr, 12pm to 4pm 3.4U/hr, 4pm to 12am 3.65U/hr   - can keep her insulin pump for now  - Add ISS for coverage, target -180  - Monitor FS and adjust accordingly   - Check A1c, lipid panel    #MDD  #Anxiety  - c/w home sertraline        #DVT ppx: not indicated  #GI ppx: pantoprazole  #Diet: DASH  #Activity: IAT  #Code: Full  #Dispo: Telemetry   #Medrec         39 year old female with PMH HTN, DM, GERD, Asthma, MDD presenting for shortness of breath and cough that worsened over the last week.   Denies fevers/chills, abdominal pain, diarrhea/constipation or urinary sx.     #Asthma exacerbation   * Recent RSV/Flu, possible post-viral bronchitis?  - Vitals in the ED: /86, HR 94, T 98.5, RR 16, SpO2 97% on 15L non rebreather => 96% on room air  - Labs: WBC 10, Hgb 12.3, electrolytes within normal, alk phos 156, ALT 42  - VBG: pH 7.45, pCO2 43, pO2 44, HCO3 30  - s/p Duoneb, Mg 2g and prednisone 60mg PO once in the ED   - Duoneb q6 and PRN  - Follow up RVP, procal, urine legionella, urine strep   - Continue Solumedrol 40mg q8h   - Start Doxy     #Prolonged QTc   - EKG: sinus tachy 110, QTc 514  - Avoid QT prolonging meds  - Repeat EKG in AM    #HTN  - continue home meds     #DM  - seen by Dr. Galvez back in early january. Keep pump settings at: 12am to 12pm 2.65U/hr, 12pm to 4pm 3.4U/hr, 4pm to 12am 3.65U/hr   - can keep her insulin pump for now  - Add ISS for coverage, target -180  - Monitor FS and adjust accordingly   - Check A1c, lipid panel    #MDD  #Anxiety  - c/w home sertraline        #DVT ppx: not indicated  #GI ppx: pantoprazole  #Diet: DASH  #Activity: IAT  #Code: Full  #Dispo: Telemetry   #Medrec confirmed with nursing home

## 2025-02-06 NOTE — CONSULT NOTE ADULT - ASSESSMENT
for now, stop insulin pump, try insullin glargine 34 units twice daily, and lispro 10 units before 3 meals, needs 1/2 n/saline at 75 ml/hour for iv hydration while hyperglycemic.
39 year old female with PMH pseudoseizures, migraines, syncope, lumbar radiculopathy, HTN, DM, GERD, Asthma, MDD presenting for shortness of breath and cough that worsened over the last week. Currently admitted to medicine for asthma exacerbation and bronchitis. Neurology consulted for seizure like activity leading to RRT being called this morning. On exam, patient was drowsy with no clinical signs of seizure (s/p ativan). Patient well known to neurology service, has had multiple EEGs, most recently last month with no electrographic correlate despite an event being captured. Unclear nature of episode this morning, but given forced eye closure during the event and with previous EEGs being negative, patient likely had a psychogenic seizure.      Recommendations  - can get rEEG  - No need for ASM for now  - Seizure precaution  - Keep Mg >2

## 2025-02-06 NOTE — RAPID RESPONSE TEAM SUMMARY - NSSITUATIONBACKGROUNDRRT_GEN_ALL_CORE
Pt satting 88% on 6L and tachy to 118. RR called by RN. Recent ABG showed po2 49 however sample was mixed and not accurate. CXR this am was negative. Pt not opening her eyes to sternal rub. FS in 300s (pt was on steroids). /62. Pt placed on high flow and O2 improved to 100%, pt woke up. Temp rectal 99.2. Pt sitting up in bed awake, alert. Pt given home metoprolol dose and insulin.  Pt satting 88% on 6L and tachy to 118. RR called by RN. Recent ABG showed po2 49 however sample was mixed and not accurate. CXR this am was negative. Pt not opening her eyes to sternal rub. FS in 300s (pt was on steroids). /62. Pt placed on high flow and O2 improved to 100%, pt woke up. Temp rectal 99.2. Pt sitting up in bed awake, alert. Pt given home metoprolol dose and insulin. CTH ordered. Suspecting second psychogenic episode today.

## 2025-02-06 NOTE — H&P ADULT - HISTORY OF PRESENT ILLNESS
39 year old female with PMH HTN, DM, GERD, Asthma, MDD presenting for shortness of breath and cough that worsened over the last week. She recently had RSV/flu.  Denies fevers/chills, abdominal pain, diarrhea/constipation or urinary sx.     Vitals in the ED: /86, HR 94, T 98.5, RR 16, SpO2 97% on 15L non rebreather => 96% on room air  Labs: WBC 10, Hgb 12.3, electrolytes within normal, alk phos 156, ALT 42  VBG: pH 7.45, pCO2 43, pO2 44, HCO3 30  CXR:     In the ED, patient got Duoneb, Mg 2g and prednisone 60mg PO once.        39 year old female with PMH HTN, DM, GERD, Asthma, MDD presenting for shortness of breath and cough that worsened over the last week. She recently had RSV/flu last week, went to urgent care but patient is unsure of diagnosis given.   Denies fevers/chills, abdominal pain, diarrhea/constipation or urinary sx.     Vitals in the ED: /86, HR 94, T 98.5, RR 16, SpO2 97% on 15L non rebreather => 96% on room air  Labs: WBC 10, Hgb 12.3, electrolytes within normal, alk phos 156, ALT 42  VBG: pH 7.45, pCO2 43, pO2 44, HCO3 30    In the ED, patient got Duoneb, Mg 2g and prednisone 60mg PO once.

## 2025-02-06 NOTE — H&P ADULT - NSHPLABSRESULTS_GEN_ALL_CORE
LABS:                        12.3   10.71 )-----------( 575      ( 05 Feb 2025 20:10 )             39.7     02-05    137  |  101  |  9[L]  ----------------------------<  102[H]  4.1   |  24  |  0.5[L]    Ca    9.6      05 Feb 2025 20:10    TPro  7.1  /  Alb  4.1  /  TBili  <0.2  /  DBili  x   /  AST  27  /  ALT  42[H]  /  AlkPhos  156[H]  02-05

## 2025-02-06 NOTE — CONSULT NOTE ADULT - ATTENDING COMMENTS
Patient seen and examined and agree with above except as noted.  Patients history, notes, labs, imaging, vitals and meds reviewed personally.  Sedated following ativan   Withdraws extremities to noxious stimuli  Plantars down b/l  Prior EEG's and consults reviewed    Plan as above

## 2025-02-06 NOTE — PROGRESS NOTE ADULT - ASSESSMENT
39 year old female with PMH HTN, DM, GERD, Asthma, MDD presenting for shortness of breath and cough that worsened over the last week.   Denies fevers/chills, abdominal pain, diarrhea/constipation or urinary sx.     #Asthma exacerbation   * Recent RSV/Flu, possible post-viral bronchitis?  - Vitals in the ED: /86, HR 94, T 98.5, RR 16, SpO2 97% on 15L non rebreather => 96% on room air  - Labs: WBC 10, Hgb 12.3, electrolytes within normal, alk phos 156, ALT 42  - VBG: pH 7.45, pCO2 43, pO2 44, HCO3 30  - s/p Duoneb, Mg 2g and prednisone 60mg PO once in the ED   - Duoneb q6 and PRN  - Follow up RVP, procal, urine legionella, urine strep   - dec solumedrol to   - DC doxy, no pneumonia     #Acute hypoxic resp failure 2/2 post ictal vs 2/2 ativan vs CELINE   - was placed on NRB , resolved  - ABG done first was mixed and repeat was stable   - pt woke up spontanouisly  - cxr stable     #Prolonged QTc   #sinus tach  - EKG: sinus tachy 110, QTc 514  - Avoid QT prolonging meds  - Repeat EKG in AM  - continue metoprol.   - pt tachy as did not get her metorpolol this am      #Seizure  #pseudoseizure   - had Rapid response in am   - was actively seizing , had ativan 2 mg intravenous x1   - pt seizure  resolved  - was post ictal after ativan   - Neuro consulted, veeg   - another rapid response happened as pt was still post ictal  and desated, during exam pt was refusing to open eyes and resisting,  but after multiple sternal rubs and telling the pt she would be intubated the pt spontaneously woke up and started having her lunch   - jesús pseudoseizure     - lactae stable   - mag 2.2  - cc consult was called but cancelled after waking up spontaneously     #HTN  - continue home meds     #DM hyperglycemia 2/2 solumedrol   - seen by Dr. Galvez back in early january. Keep pump settings at: 12am to 12pm 2.65U/hr, 12pm to 4pm 3.4U/hr, 4pm to 12am 3.65U/hr   - can keep her insulin pump for now  - Add ISS for coverage, target -180  - Monitor FS and adjust accordingly   - Check A1c, lipid panel  - will adjust insulin, dec solumedrol    #MDD  #Anxiety  - c/w home sertraline    #Progress Note Handoff  Pending (specify):  clinicla improvement  Family discussion: house staff updated pt family  Disposition: piyush  Decision to admit the pt is based on acuity as above    39 year old female with PMH HTN, DM, GERD, Asthma, MDD presenting for shortness of breath and cough that worsened over the last week.   Denies fevers/chills, abdominal pain, diarrhea/constipation or urinary sx.     #Asthma exacerbation   * Recent RSV/Flu, possible post-viral bronchitis?  - Vitals in the ED: /86, HR 94, T 98.5, RR 16, SpO2 97% on 15L non rebreather => 96% on room air  - Labs: WBC 10, Hgb 12.3, electrolytes within normal, alk phos 156, ALT 42  - VBG: pH 7.45, pCO2 43, pO2 44, HCO3 30  - s/p Duoneb, Mg 2g and prednisone 60mg PO once in the ED   - Duoneb q6 and PRN  - Follow up RVP, procal, urine legionella, urine strep   - dec solumedrol to   - DC doxy, no pneumonia   - no further wheezing on exam     #Acute hypoxic resp failure 2/2 post ictal vs 2/2 ativan vs CELINE   #acute asthma exacerbation, no wheezing   - was placed on NRB , resolved  - ABG done first was mixed and repeat was stable   - pt woke up spontanouisly  - cxr stable     #Prolonged QTc   #sinus tach  - EKG: sinus tachy 110, QTc 514  - Avoid QT prolonging meds  - Repeat EKG in AM  - continue metoprol.   - pt tachy as did not get her metorpolol this am      #Seizure  #pseudoseizure   - had Rapid response in am   - was actively seizing , had ativan 2 mg intravenous x1   - pt seizure  resolved  - was post ictal after ativan   - Neuro consulted, veeg   - another rapid response happened as pt was still post ictal  and desated, during exam pt was refusing to open eyes and resisting,  but after multiple sternal rubs and telling the pt she would be intubated the pt spontaneously woke up and started having her lunch   - jesús pseudoseizure     - lactae stable   - mag 2.2  - cc consult was called but cancelled after waking up spontaneously  - neuro consult appreciated, follow up reeg     #HTN  - continue home meds     #DM hyperglycemia 2/2 solumedrol   - seen by Dr. Galvez back in early january. Keep pump settings at: 12am to 12pm 2.65U/hr, 12pm to 4pm 3.4U/hr, 4pm to 12am 3.65U/hr   - can keep her insulin pump for now  - Add ISS for coverage, target -180  - Monitor FS and adjust accordingly   - Check A1c, lipid panel  - will adjust insulin, dec solumedrol  - pt did not bolus herself today as she was given ativan during seizure  -will follow up above settings from Dr. Galvez and have endo consulted  - trend bmp to r/o dka, ag closed, team to adjust insulin pump and pt give ss, BHB ordered   - if gap starts to open and BHB elevated, get CC consult for upgrade    #MDD  #Anxiety  - c/w home sertraline    #Progress Note Handoff  Pending (specify):  clinical  improvement, follow up BMPs   Family discussion: house staff updated pt family  Disposition: piyush  Decision to admit the pt is based on acuity as above    39 year old female with PMH HTN, DM, GERD, Asthma, MDD presenting for shortness of breath and cough that worsened over the last week.   Denies fevers/chills, abdominal pain, diarrhea/constipation or urinary sx.     #Asthma exacerbation   * Recent RSV/Flu, possible post-viral bronchitis?  - Vitals in the ED: /86, HR 94, T 98.5, RR 16, SpO2 97% on 15L non rebreather => 96% on room air  - Labs: WBC 10, Hgb 12.3, electrolytes within normal, alk phos 156, ALT 42  - VBG: pH 7.45, pCO2 43, pO2 44, HCO3 30  - s/p Duoneb, Mg 2g and prednisone 60mg PO once in the ED   - Duoneb q6 and PRN  - Follow up RVP, procal, urine legionella, urine strep   - dec solumedrol to   - DC doxy, no pneumonia   - no further wheezing on exam     #Acute hypoxic resp failure 2/2 post ictal vs 2/2 ativan vs CELINE   #acute asthma exacerbation, no wheezing   - was placed on NRB , resolved  - ABG done first was mixed and repeat was stable   - pt woke up spontanouisly  - cxr stable     #Prolonged QTc   #sinus tach  - EKG: sinus tachy 110, QTc 514  - Avoid QT prolonging meds  - Repeat EKG in AM  - continue metoprol.   - pt tachy as did not get her metorpolol this am      #Seizure  #pseudoseizure   - had Rapid response in am   - was actively seizing , had ativan 2 mg intravenous x1   - pt seizure  resolved  - was post ictal after ativan   - Neuro consulted, veeg   - another rapid response happened as pt was still post ictal  and desated, during exam pt was refusing to open eyes and resisting,  but after multiple sternal rubs and telling the pt she would be intubated the pt spontaneously woke up and started having her lunch   - jesús pseudoseizure     - lactae stable   - mag 2.2  - cc consult was called but cancelled after waking up spontaneously  - neuro consult appreciated, follow up reeg     #HTN  - continue home meds     #DM hyperglycemia 2/2 solumedrol   - seen by Dr. Galvez back in early january. Keep pump settings at: 12am to 12pm 2.65U/hr, 12pm to 4pm 3.4U/hr, 4pm to 12am 3.65U/hr   - can keep her insulin pump for now  - Add ISS for coverage, target -180  - Monitor FS and adjust accordingly   - Check A1c, lipid panel  - will adjust insulin, dec solumedrol  - pt did not bolus herself today as she was given ativan during seizure  - given pt current mental status, will dc pump and start insulin regimen, dw endo they will review regimen   - trend bmp to r/o dka, ag closed, team to adjust insulin pump and pt give ss, BHB ordered   - if gap starts to open and BHB elevated, get CC consult for upgrade    #MDD  #Anxiety  - c/w home sertraline    #Progress Note Handoff  Pending (specify):  clinical  improvement, follow up BMPs   Family discussion: house staff updated pt family  Disposition: piyush  Decision to admit the pt is based on acuity as above    No respiratory distress. No stridor, Lungs sounds clear with good aeration bilaterally.

## 2025-02-06 NOTE — CONSULT NOTE ADULT - CONSULT REQUESTED DATE/TIME
06-Feb-2025 09:14
HPI


Chief Complaint:  Oral / Dental Pain or Problem


Time Seen by Provider:  16:50


Travel History


International Travel<30 days:  No


Contact w/Intl Traveler<30days:  No


Traveled to known affect area:  No





History of Present Illness


HPI


33-year-old male states that he went to the [facility] and they placed him on a 

penicillin antibiotic but as he was throwing up but not feeling better he went 

to an urgent care.  He states they placed him on a nausea medication and 

clindamycin but he threw that up.  He states he last tried to take it this 

morning.  He states that he still having pain to his right lower tooth.  He 

states the dentist plans to pull that tooth after a week of antibiotics.  He 

denies other concurrent complaints.  Quality is sharp.  Severity is moderate.  

Pain is worse with movement of his jaw.  He denies other specific modifying 

factors.





PFSH


Past Medical History


Medical History:  Denies Significant Hx





Past Surgical History


Surgical History:  No Previous Surgery





Social History


Alcohol Use:  Yes (OCC)


Tobacco Use:  No


Substance Use:  No





Allergies-Medications


(Allergen,Severity, Reaction):  


Coded Allergies:  


     No Known Allergies (Unverified , 3/22/17)


Reported Meds & Prescriptions





Reported Meds & Active Scripts


Active


Phenergan (Promethazine HCl) 25 Mg Tab 25 Mg PO Q6H PRN


Reported


Hydrocodone-Acetaminophen  mg Tab 1 Tab PO Q6H PRN


Clindamycin (Clindamycin HCl) 300 Mg Cap 300 Mg PO Q6H








Review of Systems


Except as stated in HPI:  all other systems reviewed are Neg





Physical Exam


Narrative


GENERAL: Well-nourished, well-developed patient.  Patient has pain with opening 

mouth which limits visualization


SKIN: Warm and dry.


HEAD: Normocephalic and atraumatic.


EYES: No injection or drainage. 


ENT: No nasal drainage noted.  Poor dentition noted to left lower molar with 

tenderness near this area without drainable periapical abscess, cervical 

lymphadenopathy noted on left


NECK: Supple, trachea midline.  No meningeal signs


CARDIOVASCULAR: Regular rate and rhythm 


RESPIRATORY: Breath sounds equal bilaterally. No accessory muscle use.


GASTROINTESTINAL: Abdomen soft, non-tender, nondistended. 


NEUROLOGICAL: Awake and alert. Motor and sensory grossly within normal limits. 

Normal speech.





Data


Data


Last Documented VS





Vital Signs








  Date Time  Temp Pulse Resp B/P Pulse Ox O2 Delivery O2 Flow Rate FiO2


 


3/22/17 15:55 99.7 87 16 129/81 98   








Orders





 Complete Blood Count With Diff (3/22/17 16:53)


Basic Metabolic Panel (Bmp) (3/22/17 16:53)


Iv Access Insert/Monitor (3/22/17 16:53)


Ecg Monitoring (3/22/17 16:53)


Oximetry (3/22/17 16:53)


Ondansetron Inj (Zofran Inj) (3/22/17 17:00)


Sodium Chlor 0.9% 1000 Ml Inj (Ns 1000 M (3/22/17 17:00)


Clindamycin Inj (Cleocin Inj) (3/22/17 17:00)


Ketorolac Inj (Toradol Inj) (3/22/17 17:30)


Oral Rehydration (3/22/17 17:24)





Labs





 Laboratory Tests








Test 3/22/17





 17:00


 


White Blood Count 12.0 TH/MM3


 


Red Blood Count 4.71 MIL/MM3


 


Hemoglobin 13.6 GM/DL


 


Hematocrit 40.4 %


 


Mean Corpuscular Volume 85.9 FL


 


Mean Corpuscular Hemoglobin 28.8 PG


 


Mean Corpuscular Hemoglobin 33.5 %





Concent 


 


Red Cell Distribution Width 11.8 %


 


Platelet Count 232 TH/MM3


 


Mean Platelet Volume 8.3 FL


 


Neutrophils (%) (Auto) 76.8 %


 


Lymphocytes (%) (Auto) 10.3 %


 


Monocytes (%) (Auto) 8.9 %


 


Eosinophils (%) (Auto) 0.2 %


 


Basophils (%) (Auto) 3.8 %


 


Neutrophils # (Auto) 9.2 TH/MM3


 


Lymphocytes # (Auto) 1.2 TH/MM3


 


Monocytes # (Auto) 1.1 TH/MM3


 


Eosinophils # (Auto) 0.0 TH/MM3


 


Basophils # (Auto) 0.5 TH/MM3


 


CBC Comment DIFF FINAL 


 


Differential Comment  


 


Sodium Level 137 MEQ/L


 


Potassium Level 3.7 MEQ/L


 


Chloride Level 97 MEQ/L


 


Carbon Dioxide Level 29.8 MEQ/L


 


Anion Gap 10 MEQ/L


 


Blood Urea Nitrogen 17 MG/DL


 


Creatinine 0.92 MG/DL


 


Estimat Glomerular Filtration 95 ML/MIN





Rate 


 


Random Glucose 126 MG/DL


 


Calcium Level 9.8 MG/DL











MDM


Medical Decision Making


Medical Screen Exam Complete:  Yes


Emergency Medical Condition:  Yes


Medical Record Reviewed:  Yes (past history confirmed)


Interpretation(s)


CBC & BMP Diagram


3/22/17 17:00








Differential Diagnosis


Dental abscess, dental Caries, parotitis


Narrative Course


Will check blood work and dose with Toradol, Zofran, IV fluids, clindamycin and 

reevaluate





On recheck patient is feeling better.  no emesis here, Able to open his mouth 

with better visualization but still notes discomfort, no facial swelling other 

than mild left-sided cervical lymphadenopathy.  Patient agrees to hold on CT 

imaging and to return if worsens, all questions answered. Patient knows that 

follow up is incumbent on them and to return to the emergency room immediately 

if new or worsening symptoms develop. Patient given strict return precautions, 

vitals reviewed and are normal, agrees to further workup as an outpatient.





Diagnosis





 Primary Impression:  


 Dental infection


Patient Instructions:  General Instructions





***Additional Instructions:


return as needed-fever, persistent vomiting..., keep hydrated, zofran and 

phenergan as needed for nausea, take antibiotic course completely, follow with 

dentist tommorrow for recheck


***Med/Other Pt SpecificInfo:  Prescription(s) given


Scripts


Promethazine (Phenergan)25 Mg Tab25 Mg PO Q6H PRN (NAUSEA OR VOMITING) #15 TAB


   Prov:Risa Eubanks MD         3/22/17


Disposition:  01 DISCHARGE HOME


Condition:  Stable








Risa Eubanks MD Mar 22, 2017 17:54
06-Feb-2025 17:09

## 2025-02-06 NOTE — CONSULT NOTE ADULT - SUBJECTIVE AND OBJECTIVE BOX
Neurology Consult    Patient is a 39y old  Female who presents with a chief complaint of SOB. Neurology consulted for seizure like activity.     HPI:  39 year old female with PMH HTN, DM, GERD, Asthma, MDD presenting for shortness of breath and cough that worsened over the last week. She recently had RSV/flu last week, went to urgent care but patient is unsure of diagnosis given. Currently admitted for COPD exacerbation.         PAST MEDICAL & SURGICAL HISTORY:  Neuropathy  right lower extremity, vaginal      Type 1 diabetes      Degenerative disc disease, thoracic      Urinary tract infection, recurrent      Gastroesophageal reflux disease, esophagitis presence not specified      Intractable headache, unspecified chronicity pattern, unspecified headache type      Tremor of right hand      Tachycardia      Spinal stenosis      No significant past surgical history          FAMILY HISTORY:      Social History: (-) x 3    Allergies    clindamycin (Hives; Nephrotoxicity)  amoxicillin (Hives)  Clindamycin Phosphate (Unknown)  Fluad 0.5 ML ODETTE (Unknown)  penicillins (Hives)  Ceclor (Rash)    Intolerances    gabapentin (Other)  Influenza Virus Vaccine, H5N1, Inactivated (Other)  Cipro (Other)      MEDICATIONS  (STANDING):  albuterol    90 MICROgram(s) HFA Inhaler 2 Puff(s) Inhalation every 6 hours  albuterol/ipratropium for Nebulization 3 milliLiter(s) Nebulizer every 6 hours  amLODIPine   Tablet 5 milliGRAM(s) Oral daily  aspirin  chewable 81 milliGRAM(s) Oral daily  atorvastatin 40 milliGRAM(s) Oral at bedtime  dextrose 5%. 1000 milliLiter(s) (100 mL/Hr) IV Continuous <Continuous>  dextrose 5%. 1000 milliLiter(s) (50 mL/Hr) IV Continuous <Continuous>  dextrose 50% Injectable 25 Gram(s) IV Push once  dextrose 50% Injectable 12.5 Gram(s) IV Push once  dextrose 50% Injectable 25 Gram(s) IV Push once  doxycycline IVPB 100 milliGRAM(s) IV Intermittent every 12 hours  glucagon  Injectable 1 milliGRAM(s) IntraMuscular once  insulin lispro (ADMELOG) corrective regimen sliding scale   SubCutaneous three times a day before meals  LORazepam   Injectable 2 milliGRAM(s) IV Push once  methylPREDNISolone sodium succinate Injectable 40 milliGRAM(s) IV Push every 8 hours  metoprolol tartrate 25 milliGRAM(s) Oral three times a day  pantoprazole    Tablet 40 milliGRAM(s) Oral before breakfast  sertraline 100 milliGRAM(s) Oral daily    MEDICATIONS  (PRN):  dextrose Oral Gel 15 Gram(s) Oral once PRN Blood Glucose LESS THAN 70 milliGRAM(s)/deciliter      Review of systems:    Unable to asses     Vital Signs Last 24 Hrs  T(C): 36.4 (06 Feb 2025 07:59), Max: 36.9 (05 Feb 2025 19:12)  T(F): 97.6 (06 Feb 2025 07:59), Max: 98.5 (05 Feb 2025 19:12)  HR: 99 (06 Feb 2025 07:59) (94 - 107)  BP: 141/78 (06 Feb 2025 07:59) (116/58 - 141/86)  BP(mean): 77 (06 Feb 2025 05:55) (77 - 89)  RR: 18 (06 Feb 2025 07:59) (16 - 18)  SpO2: 96% (06 Feb 2025 07:59) (93% - 97%)    Parameters below as of 06 Feb 2025 06:02  Patient On (Oxygen Delivery Method): room air        Examination:  General:  Appearance is consistent with chronologic age  Cognitive/Language:  The patient is oriented to person, place, time and date.  Recent and remote memory intact.  Fund of knowledge is intact and normal.  Language with normal repetition, comprehension and naming.  Nondysarthric.    Eyes: intact VA, VFF.  EOMI w/o nystagmus, skew or reported double vision.  PERRL.  No ptosis/weakness of eyelid closure.    Face:  Facial sensation normal V1 - 3, no facial asymmetry.    Ears/Nose/Throat:  Hearing grossly intact b/l.  Palate elevates midline.  Tongue and uvula midline.   Motor examination:   Normal tone, bulk and range of motion.  No tenderness, twitching, tremors or involuntary movements.  Formal Muscle Strength Testing: (MRC grade R/L) 5/5 UE; 5/5 LE.  No observable drift.  Reflexes:   2+ b/l pectoralis, biceps, triceps, brachioradialis, patella and Achilles.  Plantar response downgoing b/l.  Jaw jerk, Amada, clonus absent.  Sensory examination:   Intact to light touch and pinprick, pain, temperature and proprioception and vibration in all extremities.  Cerebellum:   FTN/HKS intact with normal MARIN in all limbs.  No dysmetria or dysdiadokinesia.  Gait narrow based and normal.    Labs:   CBC Full  -  ( 06 Feb 2025 07:37 )  WBC Count : 13.13 K/uL  RBC Count : 5.00 M/uL  Hemoglobin : 12.6 g/dL  Hematocrit : 40.4 %  Platelet Count - Automated : 599 K/uL  Mean Cell Volume : 80.8 fL  Mean Cell Hemoglobin : 25.2 pg  Mean Cell Hemoglobin Concentration : 31.2 g/dL  Auto Neutrophil # : 12.02 K/uL  Auto Lymphocyte # : 0.91 K/uL  Auto Monocyte # : 0.07 K/uL  Auto Eosinophil # : 0.01 K/uL  Auto Basophil # : 0.02 K/uL  Auto Neutrophil % : 91.5 %  Auto Lymphocyte % : 6.9 %  Auto Monocyte % : 0.5 %  Auto Eosinophil % : 0.1 %  Auto Basophil % : 0.2 %    02-05    137  |  101  |  9[L]  ----------------------------<  102[H]  4.1   |  24  |  0.5[L]    Ca    9.6      05 Feb 2025 20:10    TPro  7.1  /  Alb  4.1  /  TBili  <0.2  /  DBili  x   /  AST  27  /  ALT  42[H]  /  AlkPhos  156[H]  02-05    LIVER FUNCTIONS - ( 05 Feb 2025 20:10 )  Alb: 4.1 g/dL / Pro: 7.1 g/dL / ALK PHOS: 156 U/L / ALT: 42 U/L / AST: 27 U/L / GGT: x             Urinalysis Basic - ( 05 Feb 2025 20:10 )    Color: x / Appearance: x / SG: x / pH: x  Gluc: 102 mg/dL / Ketone: x  / Bili: x / Urobili: x   Blood: x / Protein: x / Nitrite: x   Leuk Esterase: x / RBC: x / WBC x   Sq Epi: x / Non Sq Epi: x / Bacteria: x      [facility]  [facility]:     02-06-25 @ 09:14       Neurology Consult    Patient is a 39y old  Female who presents with a chief complaint of SOB. Neurology consulted for seizure like activity.     HPI:  39 year old female with PMH pseudoseizures, migraines, syncope, lumbar radiculopathy, HTN, DM, GERD, Asthma, MDD presenting for shortness of breath and cough that worsened over the last week. She recently had RSV/flu last week, went to urgent care but patient is unsure of diagnosis given. Currently admitted for asthma exacerbation and bronchitis. Neurology consulted for seizure like activity.     Rapid response was called on patient for seizure like activity. During which, she was given ativan and noted to have forced eye closure. As per chart review patient has had multiple EEGs, including vEEGs during which some event was captured with no electrographic correlate. Currently unable to obtain ROS and detailed history as patient is s/p ativan.         PAST MEDICAL & SURGICAL HISTORY:  Neuropathy  right lower extremity, vaginal      Type 1 diabetes      Degenerative disc disease, thoracic      Urinary tract infection, recurrent      Gastroesophageal reflux disease, esophagitis presence not specified      Intractable headache, unspecified chronicity pattern, unspecified headache type      Tremor of right hand      Tachycardia      Spinal stenosis      No significant past surgical history          FAMILY HISTORY:      Social History: (-) x 3    Allergies    clindamycin (Hives; Nephrotoxicity)  amoxicillin (Hives)  Clindamycin Phosphate (Unknown)  Fluad 0.5 ML ODETTE (Unknown)  penicillins (Hives)  Ceclor (Rash)    Intolerances    gabapentin (Other)  Influenza Virus Vaccine, H5N1, Inactivated (Other)  Cipro (Other)      MEDICATIONS  (STANDING):  albuterol    90 MICROgram(s) HFA Inhaler 2 Puff(s) Inhalation every 6 hours  albuterol/ipratropium for Nebulization 3 milliLiter(s) Nebulizer every 6 hours  amLODIPine   Tablet 5 milliGRAM(s) Oral daily  aspirin  chewable 81 milliGRAM(s) Oral daily  atorvastatin 40 milliGRAM(s) Oral at bedtime  dextrose 5%. 1000 milliLiter(s) (100 mL/Hr) IV Continuous <Continuous>  dextrose 5%. 1000 milliLiter(s) (50 mL/Hr) IV Continuous <Continuous>  dextrose 50% Injectable 25 Gram(s) IV Push once  dextrose 50% Injectable 12.5 Gram(s) IV Push once  dextrose 50% Injectable 25 Gram(s) IV Push once  doxycycline IVPB 100 milliGRAM(s) IV Intermittent every 12 hours  glucagon  Injectable 1 milliGRAM(s) IntraMuscular once  insulin lispro (ADMELOG) corrective regimen sliding scale   SubCutaneous three times a day before meals  LORazepam   Injectable 2 milliGRAM(s) IV Push once  methylPREDNISolone sodium succinate Injectable 40 milliGRAM(s) IV Push every 8 hours  metoprolol tartrate 25 milliGRAM(s) Oral three times a day  pantoprazole    Tablet 40 milliGRAM(s) Oral before breakfast  sertraline 100 milliGRAM(s) Oral daily    MEDICATIONS  (PRN):  dextrose Oral Gel 15 Gram(s) Oral once PRN Blood Glucose LESS THAN 70 milliGRAM(s)/deciliter      Review of systems:    Unable to asses     Vital Signs Last 24 Hrs  T(C): 36.4 (06 Feb 2025 07:59), Max: 36.9 (05 Feb 2025 19:12)  T(F): 97.6 (06 Feb 2025 07:59), Max: 98.5 (05 Feb 2025 19:12)  HR: 99 (06 Feb 2025 07:59) (94 - 107)  BP: 141/78 (06 Feb 2025 07:59) (116/58 - 141/86)  BP(mean): 77 (06 Feb 2025 05:55) (77 - 89)  RR: 18 (06 Feb 2025 07:59) (16 - 18)  SpO2: 96% (06 Feb 2025 07:59) (93% - 97%)    Parameters below as of 06 Feb 2025 06:02  Patient On (Oxygen Delivery Method): room air        Examination:  General:  Appearance is consistent with chronologic age,  Cognitive/Language: Drowsy, non able to follow any commands   Eyes: reactive to light, midline, no gaze deviation  Face:  no facial asymmetry.    Motor examination: No tenderness, twitching, tremors or involuntary movements.  Formal Muscle Strength Testing: unable to to asses given mental status but moves all extremities  Sensory examination: withdraws to pain   Cerebellum: Unable to asses     Labs:   CBC Full  -  ( 06 Feb 2025 07:37 )  WBC Count : 13.13 K/uL  RBC Count : 5.00 M/uL  Hemoglobin : 12.6 g/dL  Hematocrit : 40.4 %  Platelet Count - Automated : 599 K/uL  Mean Cell Volume : 80.8 fL  Mean Cell Hemoglobin : 25.2 pg  Mean Cell Hemoglobin Concentration : 31.2 g/dL  Auto Neutrophil # : 12.02 K/uL  Auto Lymphocyte # : 0.91 K/uL  Auto Monocyte # : 0.07 K/uL  Auto Eosinophil # : 0.01 K/uL  Auto Basophil # : 0.02 K/uL  Auto Neutrophil % : 91.5 %  Auto Lymphocyte % : 6.9 %  Auto Monocyte % : 0.5 %  Auto Eosinophil % : 0.1 %  Auto Basophil % : 0.2 %    02-05    137  |  101  |  9[L]  ----------------------------<  102[H]  4.1   |  24  |  0.5[L]    Ca    9.6      05 Feb 2025 20:10    TPro  7.1  /  Alb  4.1  /  TBili  <0.2  /  DBili  x   /  AST  27  /  ALT  42[H]  /  AlkPhos  156[H]  02-05    LIVER FUNCTIONS - ( 05 Feb 2025 20:10 )  Alb: 4.1 g/dL / Pro: 7.1 g/dL / ALK PHOS: 156 U/L / ALT: 42 U/L / AST: 27 U/L / GGT: x             Urinalysis Basic - ( 05 Feb 2025 20:10 )    Color: x / Appearance: x / SG: x / pH: x  Gluc: 102 mg/dL / Ketone: x  / Bili: x / Urobili: x   Blood: x / Protein: x / Nitrite: x   Leuk Esterase: x / RBC: x / WBC x   Sq Epi: x / Non Sq Epi: x / Bacteria: x      Neuroimaging:  [facility]:     02-06-25 @ 09:14

## 2025-02-06 NOTE — CONSULT NOTE ADULT - SUBJECTIVE AND OBJECTIVE BOX
Reason for Endocrinology Consult: Diabetes    HPI: 39y Female    PAST MEDICAL & SURGICAL HISTORY:  Neuropathy  right lower extremity, vaginal      Type 1 diabetes      Degenerative disc disease, thoracic      Urinary tract infection, recurrent      Gastroesophageal reflux disease, esophagitis presence not specified      Intractable headache, unspecified chronicity pattern, unspecified headache type      Tremor of right hand      Tachycardia      Spinal stenosis      No significant past surgical history        FAMILY HISTORY:      SH:  Smoking  Etoh:  Recreational Drugs:    Home Medications:  atorvastatin 40 mg oral tablet: 1 tab(s) orally once a day (at bedtime) (2025 03:29)  famotidine 40 mg oral tablet: 1 tab(s) orally once a day (2025 03:29)  ferrous sulfate 324 mg (65 mg elemental iron) oral tablet: 1 tab(s) orally once a day (2025 03:28)  insulin regular: 2.45 unit(s) subcutaneous every hour As directed use for continous insulin pump (2025 03:29)  metoprolol tartrate 25 mg oral tablet: 1 tab(s) orally 3 times a day (2025 03:29)  Norvasc 5 mg oral tablet: 1 tab(s) orally once a day (2025 03:29)  Nurtec ODT 75 mg oral tablet, disintegratin tab(s) orally once a day as needed for  headache (2025 03:29)  omeprazole 40 mg oral delayed release capsule: 1 cap(s) orally once a day (2025 03:29)      Current (Non-Endocrine) Meds:  albuterol    90 MICROgram(s) HFA Inhaler 2 Puff(s) Inhalation every 6 hours  albuterol/ipratropium for Nebulization 3 milliLiter(s) Nebulizer every 6 hours  amLODIPine   Tablet 5 milliGRAM(s) Oral daily  aspirin  chewable 81 milliGRAM(s) Oral daily  dextrose 5%. 1000 milliLiter(s) IV Continuous <Continuous>  dextrose 5%. 1000 milliLiter(s) IV Continuous <Continuous>  metoprolol tartrate 25 milliGRAM(s) Oral three times a day  pantoprazole    Tablet 40 milliGRAM(s) Oral before breakfast  sertraline 100 milliGRAM(s) Oral daily      Current Endocrine Meds:   atorvastatin 40 milliGRAM(s) Oral at bedtime  dextrose 50% Injectable 25 Gram(s) IV Push once  dextrose 50% Injectable 12.5 Gram(s) IV Push once  dextrose 50% Injectable 25 Gram(s) IV Push once  dextrose Oral Gel 15 Gram(s) Oral once PRN  glucagon  Injectable 1 milliGRAM(s) IntraMuscular once  insulin glargine Injectable (LANTUS) 20 Unit(s) SubCutaneous at bedtime  insulin glargine Injectable (LANTUS) 30 Unit(s) SubCutaneous once  insulin lispro (ADMELOG) corrective regimen sliding scale   SubCutaneous three times a day before meals  insulin lispro Injectable (ADMELOG) 10 Unit(s) SubCutaneous three times a day before meals  insulin regular  human recombinant. 5 Unit(s) SubCutaneous once      Allergies:  clindamycin (Hives; Nephrotoxicity)  gabapentin (Other)  amoxicillin (Hives)  Clindamycin Phosphate (Unknown)  Fluad 0.5 ML ODETTE (Unknown)  penicillins (Hives)  Influenza Virus Vaccine, H5N1, Inactivated (Other)  Cipro (Other)  Ceclor (Rash)      ROS:  Denies the following except as indicated.    General: weight loss/weight gain, decreased appetite, fatigue, fever  Eyes: blurry vision, double vision  ENT: neck swelling, dysphagia, voice changes   CV: palpitations, SOB, chest pain, cough  GI: nausea, vomiting, diarrhea, constipation, abdominal pain  : nocturia,  polyuria, dysuria  Endo: decreased libido, heat/cold intolerance, jitteriness  MSK: arthralgias, myalgias  Skin: rash, dryness, diaphoresis  Neuro: pedal numbness,pedal paresthesias, pedal pain    Height (cm): 162.6 ( @ 19:12)    Vital Signs Last 24 Hrs  T(C): 36.5 (2025 15:54), Max: 36.9 (2025 19:12)  T(F): 97.7 (2025 15:54), Max: 98.5 (2025 19:12)  HR: 91 (2025 15:54) (91 - 117)  BP: 152/70 (2025 15:54) (116/58 - 152/70)  BP(mean): 77 (2025 05:55) (77 - 89)  RR: 18 (2025 15:54) (16 - 18)  SpO2: 94% (2025 15:54) (87% - 97%)    Parameters below as of 2025 15:54  Patient On (Oxygen Delivery Method): nasal cannula      Constitutional: WN/WD in NAD.   Neck: no thyromegaly or palpable thyroid nodules   Respiratory: lungs CTAB.  Cardiovascular: regular rate and rhythm, normal S1 and S2, no audible murmurs  GI: soft, NT/ND, no masses/HSM appreciated.  Ext: no edema, no ulcers, pedal pulses palpable bilaterally  Neurology: no tremor, monofilament sensation intact in feet  Psychiatric: A&O x 3, normal affect/mood.        LABS:                        12.6   13.13 )-----------( 599      ( 2025 07:37 )             40.4     02-    135  |  102  |  10  ----------------------------<  396[H]  5.4[H]   |  19  |  0.5[L]    Ca    9.0      2025 07:37  Mg     2.2     02-06    TPro  7.2  /  Alb  4.3  /  TBili  0.3  /  DBili  x   /  AST  23  /  ALT  40  /  AlkPhos  156[H]  02-06      Urinalysis Basic - ( 2025 07:37 )    Color: x / Appearance: x / SG: x / pH: x  Gluc: 396 mg/dL / Ketone: x  / Bili: x / Urobili: x   Blood: x / Protein: x / Nitrite: x   Leuk Esterase: x / RBC: x / WBC x   Sq Epi: x / Non Sq Epi: x / Bacteria: x                             Thyroid Stimulating Hormone, Serum: 0.39 (- @ 07:37)  Thyroid Stimulating Hormone, Serum: 0.66 ( @ 08:53)          RADIOLOGY & ADDITIONAL STUDIES:    A/P:39yFemale

## 2025-02-06 NOTE — H&P ADULT - ATTENDING COMMENTS
38 Y/O Female, PMH HTN, DM, Asthma, GERD, Depression, who presents with shortness of breath and cough. patient with cough and shortness of breath that worsened x1 week. patient reports associated post-tussive emesis. denies f/c, chest pain, hemoptysis, diarrhea, leg pain/swelling,    Agree  with assessment  except for changes below.   Vital Signs Last 24 Hrs  T(C): 36.8 (06 Feb 2025 00:56), Max: 36.9 (05 Feb 2025 19:12)  T(F): 98.2 (06 Feb 2025 00:56), Max: 98.5 (05 Feb 2025 19:12)  HR: 101 (06 Feb 2025 00:56) (94 - 101)  BP: 130/69 (06 Feb 2025 00:56) (130/69 - 141/86)  BP(mean): 89 (06 Feb 2025 00:56) (89 - 89)  RR: 18 (06 Feb 2025 00:56) (16 - 18)  SpO2: 96% (06 Feb 2025 00:56) (96% - 97%)    Parameters below as of 06 Feb 2025 00:56  Patient On (Oxygen Delivery Method): room air      IMPRESSION   Acute  Respiratory  Distress Secondary to Asthma Exacerbation Bronchitis   Likely Complicated by  Bronchitis   Wheezing on PE  Recent Hx  of  RSV and Flu with Post Viral Bronchitis    Titrate supplemental O2 to maintain SpO2 92-96%; avoid hyperoxia and wean off O2 gradually. Symbicort 160-4.5mcg 2 puffs BID; Spiriva 2.5 2 puffss daily. Nebulizer treatment Q4H standing. Continue methylprednisolone 40mg IV Q8H.  F/u , MRSA, Urine strep and legionella, Procal , RVP  Start  on Doxy  Prn  Antitussive     Hx DM   Hold oral meds;  check fs  Start insulin  regimen if  serum Glucose  FS >180,   Adjust insulin  Lantus/Lispro,  for  Goal 140-180  Hold for Hypoglycemia    Send A1c, TSH , Lipid  Panel     Hx Tachycardia  and Prolongs QT  Avoid QT  prolonging medication   Repeat ECG     Hx  Depression  - continue PTA   Hx  GERD - c/w PTA   Hx  DM - c/w PTA    Seen on 02/05 38 Y/O Female, PMH HTN, DM, Asthma, GERD, Depression, who presents with shortness of breath and cough. patient with cough and shortness of breath that worsened x1 week. patient reports associated post-tussive emesis. denies f/c, chest pain, hemoptysis, diarrhea, leg pain/swelling,    Agree  with assessment  except for changes below.   Vital Signs Last 24 Hrs  T(C): 36.8 (06 Feb 2025 00:56), Max: 36.9 (05 Feb 2025 19:12)  T(F): 98.2 (06 Feb 2025 00:56), Max: 98.5 (05 Feb 2025 19:12)  HR: 101 (06 Feb 2025 00:56) (94 - 101)  BP: 130/69 (06 Feb 2025 00:56) (130/69 - 141/86)  BP(mean): 89 (06 Feb 2025 00:56) (89 - 89)  RR: 18 (06 Feb 2025 00:56) (16 - 18)  SpO2: 96% (06 Feb 2025 00:56) (96% - 97%)    Parameters below as of 06 Feb 2025 00:56  Patient On (Oxygen Delivery Method): room air      IMPRESSION   Acute  Respiratory  Distress Secondary to Asthma Exacerbation Bronchitis   Likely Complicated by  Bronchitis   Wheezing on PE  Recent Hx  of  RSV and Flu with Post Viral Bronchitis    Titrate supplemental O2 to maintain SpO2 92-96%; avoid hyperoxia and wean off O2 gradually. Symbicort 160-4.5mcg 2 puffs BID; Spiriva 2.5 2 puffss daily. Nebulizer treatment Q4H standing. Continue methylprednisolone 40mg IV Q8H.  F/u , MRSA, Urine strep and legionella, Procal , RVP  Start  on Doxy  Prn  Antitussive     Social Issues   Lives in Assisted living with Parents  On disability   Unemployed    Hx DM   Hold oral meds;  check fs  Start insulin  regimen if  serum Glucose  FS >180,   Adjust insulin  Lantus/Lispro,  for  Goal 140-180  Hold for Hypoglycemia    Send A1c, TSH , Lipid  Panel     Hx Tachycardia  and Prolongs QT  Avoid QT  prolonging medication   Repeat ECG     Hx  Depression  - continue PTA   Hx  GERD - c/w PTA   Hx  DM - c/w PTA    Seen on 02/05

## 2025-02-06 NOTE — RAPID RESPONSE TEAM SUMMARY - NSSITUATIONBACKGROUNDRRT_GEN_ALL_CORE
Pt had acute change in mentation, became unresponsive, and was rigid in UE with seizure like activity. Pt was tachy to 108 but otherwise vitals wnl, /82 ,O2 99%. 2mg IV push ativan given. Pt was resisting opening eyes. Hx of similar episode last month with negative workup. Suctioned pt. Rigidity improved. ABG and CXR ordered. Neuro consulted and EEG ordered to r/o seizure.

## 2025-02-06 NOTE — H&P ADULT - NSHPPHYSICALEXAM_GEN_ALL_CORE
T(C): 36.8 (02-06-25 @ 00:56), Max: 36.9 (02-05-25 @ 19:12)  HR: 101 (02-06-25 @ 00:56) (94 - 101)  BP: 130/69 (02-06-25 @ 00:56) (130/69 - 141/86)  RR: 18 (02-06-25 @ 00:56) (16 - 18)  SpO2: 96% (02-06-25 @ 00:56) (96% - 97%)    CONSTITUTIONAL: Well groomed, no apparent distress  RESP:   CV: RRR, +S1S2, no MRG; no JVD; no peripheral edema  GI: Soft, NT, ND, no rebound, no guarding; no palpable masses; no hepatosplenomegaly  SKIN: No rashes or ulcers noted; no subcutaneous nodules or induration palpable  NEURO: Appropriate insight/judgment; A+O x 3 T(C): 36.8 (02-06-25 @ 00:56), Max: 36.9 (02-05-25 @ 19:12)  HR: 101 (02-06-25 @ 00:56) (94 - 101)  BP: 130/69 (02-06-25 @ 00:56) (130/69 - 141/86)  RR: 18 (02-06-25 @ 00:56) (16 - 18)  SpO2: 96% (02-06-25 @ 00:56) (96% - 97%)    CONSTITUTIONAL: Well groomed, no apparent distress  RESP: bilateral wheezing  CV: RRR, +S1S2, no MRG; no JVD; no peripheral edema  GI: Soft, NT, ND, no rebound, no guarding; no palpable masses; no hepatosplenomegaly  SKIN: No rashes or ulcers noted; no subcutaneous nodules or induration palpable  NEURO: Appropriate insight/judgment; A+O x 3

## 2025-02-07 ENCOUNTER — TRANSCRIPTION ENCOUNTER (OUTPATIENT)
Age: 40
End: 2025-02-07

## 2025-02-07 ENCOUNTER — APPOINTMENT (OUTPATIENT)
Dept: PSYCHIATRY | Facility: CLINIC | Age: 40
End: 2025-02-07

## 2025-02-07 ENCOUNTER — NON-APPOINTMENT (OUTPATIENT)
Age: 40
End: 2025-02-07

## 2025-02-07 LAB
ALBUMIN SERPL ELPH-MCNC: 4.1 G/DL — SIGNIFICANT CHANGE UP (ref 3.5–5.2)
ALP SERPL-CCNC: 138 U/L — HIGH (ref 30–115)
ALT FLD-CCNC: 46 U/L — HIGH (ref 0–41)
ANION GAP SERPL CALC-SCNC: 11 MMOL/L — SIGNIFICANT CHANGE UP (ref 7–14)
ANION GAP SERPL CALC-SCNC: 12 MMOL/L — SIGNIFICANT CHANGE UP (ref 7–14)
AST SERPL-CCNC: 29 U/L — SIGNIFICANT CHANGE UP (ref 0–41)
BILIRUB SERPL-MCNC: 0.3 MG/DL — SIGNIFICANT CHANGE UP (ref 0.2–1.2)
BUN SERPL-MCNC: 15 MG/DL — SIGNIFICANT CHANGE UP (ref 10–20)
BUN SERPL-MCNC: 15 MG/DL — SIGNIFICANT CHANGE UP (ref 10–20)
CALCIUM SERPL-MCNC: 8.9 MG/DL — SIGNIFICANT CHANGE UP (ref 8.4–10.5)
CALCIUM SERPL-MCNC: 9.2 MG/DL — SIGNIFICANT CHANGE UP (ref 8.4–10.5)
CHLORIDE SERPL-SCNC: 100 MMOL/L — SIGNIFICANT CHANGE UP (ref 98–110)
CHLORIDE SERPL-SCNC: 99 MMOL/L — SIGNIFICANT CHANGE UP (ref 98–110)
CO2 SERPL-SCNC: 22 MMOL/L — SIGNIFICANT CHANGE UP (ref 17–32)
CO2 SERPL-SCNC: 23 MMOL/L — SIGNIFICANT CHANGE UP (ref 17–32)
CREAT SERPL-MCNC: 0.5 MG/DL — LOW (ref 0.7–1.5)
CREAT SERPL-MCNC: 0.7 MG/DL — SIGNIFICANT CHANGE UP (ref 0.7–1.5)
EGFR: 113 ML/MIN/1.73M2 — SIGNIFICANT CHANGE UP
EGFR: 122 ML/MIN/1.73M2 — SIGNIFICANT CHANGE UP
GLUCOSE BLDC GLUCOMTR-MCNC: 307 MG/DL — HIGH (ref 70–99)
GLUCOSE BLDC GLUCOMTR-MCNC: 361 MG/DL — HIGH (ref 70–99)
GLUCOSE BLDC GLUCOMTR-MCNC: 371 MG/DL — HIGH (ref 70–99)
GLUCOSE BLDC GLUCOMTR-MCNC: 375 MG/DL — HIGH (ref 70–99)
GLUCOSE BLDC GLUCOMTR-MCNC: 404 MG/DL — HIGH (ref 70–99)
GLUCOSE SERPL-MCNC: 336 MG/DL — HIGH (ref 70–99)
GLUCOSE SERPL-MCNC: 434 MG/DL — HIGH (ref 70–99)
HCT VFR BLD CALC: 38.3 % — SIGNIFICANT CHANGE UP (ref 37–47)
HGB BLD-MCNC: 11.6 G/DL — LOW (ref 12–16)
LEGIONELLA AG UR QL: NEGATIVE — SIGNIFICANT CHANGE UP
MAGNESIUM SERPL-MCNC: 2 MG/DL — SIGNIFICANT CHANGE UP (ref 1.8–2.4)
MCHC RBC-ENTMCNC: 25.1 PG — LOW (ref 27–31)
MCHC RBC-ENTMCNC: 30.3 G/DL — LOW (ref 32–37)
MCV RBC AUTO: 82.7 FL — SIGNIFICANT CHANGE UP (ref 81–99)
NRBC # BLD: 0 /100 WBCS — SIGNIFICANT CHANGE UP (ref 0–0)
NRBC BLD-RTO: 0 /100 WBCS — SIGNIFICANT CHANGE UP (ref 0–0)
PCP SPEC-MCNC: SIGNIFICANT CHANGE UP
PLATELET # BLD AUTO: 564 K/UL — HIGH (ref 130–400)
PMV BLD: 10.9 FL — HIGH (ref 7.4–10.4)
POTASSIUM SERPL-MCNC: 4.7 MMOL/L — SIGNIFICANT CHANGE UP (ref 3.5–5)
POTASSIUM SERPL-MCNC: 5.1 MMOL/L — HIGH (ref 3.5–5)
POTASSIUM SERPL-SCNC: 4.7 MMOL/L — SIGNIFICANT CHANGE UP (ref 3.5–5)
POTASSIUM SERPL-SCNC: 5.1 MMOL/L — HIGH (ref 3.5–5)
PROT SERPL-MCNC: 7 G/DL — SIGNIFICANT CHANGE UP (ref 6–8)
RAPID RVP RESULT: SIGNIFICANT CHANGE UP
RBC # BLD: 4.63 M/UL — SIGNIFICANT CHANGE UP (ref 4.2–5.4)
RBC # FLD: 24.8 % — HIGH (ref 11.5–14.5)
SARS-COV-2 RNA SPEC QL NAA+PROBE: SIGNIFICANT CHANGE UP
SODIUM SERPL-SCNC: 133 MMOL/L — LOW (ref 135–146)
SODIUM SERPL-SCNC: 134 MMOL/L — LOW (ref 135–146)
WBC # BLD: 13.93 K/UL — HIGH (ref 4.8–10.8)
WBC # FLD AUTO: 13.93 K/UL — HIGH (ref 4.8–10.8)

## 2025-02-07 PROCEDURE — 99232 SBSQ HOSP IP/OBS MODERATE 35: CPT

## 2025-02-07 PROCEDURE — 95819 EEG AWAKE AND ASLEEP: CPT | Mod: 26

## 2025-02-07 RX ORDER — INSULIN GLARGINE-YFGN 100 [IU]/ML
35 INJECTION, SOLUTION SUBCUTANEOUS EVERY MORNING
Refills: 0 | Status: DISCONTINUED | OUTPATIENT
Start: 2025-02-07 | End: 2025-02-10

## 2025-02-07 RX ORDER — INSULIN GLARGINE-YFGN 100 [IU]/ML
35 INJECTION, SOLUTION SUBCUTANEOUS AT BEDTIME
Refills: 0 | Status: DISCONTINUED | OUTPATIENT
Start: 2025-02-07 | End: 2025-02-10

## 2025-02-07 RX ORDER — DM/PSEUDOEPHED/ACETAMINOPHEN 10-30-250
25 CAPSULE ORAL ONCE
Refills: 0 | Status: DISCONTINUED | OUTPATIENT
Start: 2025-02-07 | End: 2025-02-10

## 2025-02-07 RX ORDER — DM/PSEUDOEPHED/ACETAMINOPHEN 10-30-250
12.5 CAPSULE ORAL ONCE
Refills: 0 | Status: DISCONTINUED | OUTPATIENT
Start: 2025-02-07 | End: 2025-02-10

## 2025-02-07 RX ORDER — FLUCONAZOLE 100 MG/1
200 TABLET ORAL ONCE
Refills: 0 | Status: COMPLETED | OUTPATIENT
Start: 2025-02-07 | End: 2025-02-07

## 2025-02-07 RX ORDER — DM/PSEUDOEPHED/ACETAMINOPHEN 10-30-250
15 CAPSULE ORAL ONCE
Refills: 0 | Status: DISCONTINUED | OUTPATIENT
Start: 2025-02-07 | End: 2025-02-10

## 2025-02-07 RX ORDER — INSULIN LISPRO 100/ML
VIAL (ML) SUBCUTANEOUS
Refills: 0 | Status: DISCONTINUED | OUTPATIENT
Start: 2025-02-07 | End: 2025-02-10

## 2025-02-07 RX ORDER — INSULIN LISPRO 100/ML
16 VIAL (ML) SUBCUTANEOUS
Refills: 0 | Status: DISCONTINUED | OUTPATIENT
Start: 2025-02-07 | End: 2025-02-10

## 2025-02-07 RX ADMIN — Medication 25 MILLIGRAM(S): at 21:11

## 2025-02-07 RX ADMIN — Medication 5: at 17:04

## 2025-02-07 RX ADMIN — Medication 10 UNIT(S): at 08:50

## 2025-02-07 RX ADMIN — IPRATROPIUM BROMIDE AND ALBUTEROL SULFATE 3 MILLILITER(S): .5; 2.5 SOLUTION RESPIRATORY (INHALATION) at 21:11

## 2025-02-07 RX ADMIN — Medication 100 MILLIGRAM(S): at 12:04

## 2025-02-07 RX ADMIN — IPRATROPIUM BROMIDE AND ALBUTEROL SULFATE 3 MILLILITER(S): .5; 2.5 SOLUTION RESPIRATORY (INHALATION) at 08:50

## 2025-02-07 RX ADMIN — Medication 25 MILLIGRAM(S): at 06:37

## 2025-02-07 RX ADMIN — Medication 10 UNIT(S): at 17:06

## 2025-02-07 RX ADMIN — INSULIN GLARGINE-YFGN 10 UNIT(S): 100 INJECTION, SOLUTION SUBCUTANEOUS at 08:49

## 2025-02-07 RX ADMIN — IPRATROPIUM BROMIDE AND ALBUTEROL SULFATE 3 MILLILITER(S): .5; 2.5 SOLUTION RESPIRATORY (INHALATION) at 15:54

## 2025-02-07 RX ADMIN — INSULIN GLARGINE-YFGN 35 UNIT(S): 100 INJECTION, SOLUTION SUBCUTANEOUS at 21:15

## 2025-02-07 RX ADMIN — Medication 5: at 12:02

## 2025-02-07 RX ADMIN — Medication 8: at 21:13

## 2025-02-07 RX ADMIN — ATORVASTATIN CALCIUM 40 MILLIGRAM(S): 80 TABLET, FILM COATED ORAL at 21:11

## 2025-02-07 RX ADMIN — Medication 25 MILLIGRAM(S): at 15:55

## 2025-02-07 RX ADMIN — Medication 10 UNIT(S): at 12:03

## 2025-02-07 RX ADMIN — Medication 5 MILLIGRAM(S): at 06:08

## 2025-02-07 RX ADMIN — ASPIRIN 81 MILLIGRAM(S): 81 TABLET, COATED ORAL at 12:04

## 2025-02-07 RX ADMIN — FLUCONAZOLE 200 MILLIGRAM(S): 100 TABLET ORAL at 12:04

## 2025-02-07 RX ADMIN — PANTOPRAZOLE 40 MILLIGRAM(S): 20 TABLET, DELAYED RELEASE ORAL at 08:50

## 2025-02-07 RX ADMIN — Medication 6: at 08:49

## 2025-02-07 RX ADMIN — PREDNISONE 40 MILLIGRAM(S): 5 TABLET ORAL at 06:08

## 2025-02-07 NOTE — DISCHARGE NOTE PROVIDER - HOSPITAL COURSE
39 year old female with PMH HTN, DM, GERD, Asthma, MDD presenting for shortness of breath and cough that worsened over the last week. She recently had RSV/flu last week, went to urgent care but patient is unsure of diagnosis given.   Denies fevers/chills, abdominal pain, diarrhea/constipation or urinary sx.     Vitals in the ED: /86, HR 94, T 98.5, RR 16, SpO2 97% on 15L non rebreather => 96% on room air  Labs: WBC 10, Hgb 12.3, electrolytes within normal, alk phos 156, ALT 42  VBG: pH 7.45, pCO2 43, pO2 44, HCO3 30    In the ED, patient got Duoneb, Mg 2g and prednisone 60mg PO once.     Pt was admitted to medicine for further management. While admitted, pt had 2 RR called, one for seizure like activity and another for unresponsiveness with hypoxia. Suspecting psychogenic cause. Loop interrogation showed no events. CXR negative. Neuro followed and recommended EEG which showed no events. Pt awake, alert, eating and back to her baseline. Pt's glucose was elevated during her hospital stay, her insulin pump was disconnected and her DM regimen was optimized. Pt is stable and ready for discharge.    Discussion of discharge plan of care, including discharge diagnoses, medication reconciliation, and follow-ups was conducted with Dr. Patel on 2/8/25 and discharge was approved.       39 year old female with PMH HTN, DM, GERD, Asthma, MDD presenting for shortness of breath and cough that worsened over the last week. She recently had RSV/flu last week, went to urgent care but patient is unsure of diagnosis given.   Denies fevers/chills, abdominal pain, diarrhea/constipation or urinary sx.     Vitals in the ED: /86, HR 94, T 98.5, RR 16, SpO2 97% on 15L non rebreather => 96% on room air  Labs: WBC 10, Hgb 12.3, electrolytes within normal, alk phos 156, ALT 42  VBG: pH 7.45, pCO2 43, pO2 44, HCO3 30    In the ED, patient got Duoneb, Mg 2g and prednisone 60mg PO once.     Pt was admitted to medicine for further management. While admitted, pt had 2 RR called, one for seizure like activity and another for unresponsiveness with hypoxia. Suspecting psychogenic cause. Loop interrogation showed no events. CXR negative. Neuro followed and recommended EEG which showed no events. Pt awake, alert, eating and back to her baseline. Pt's glucose was elevated during her hospital stay, her insulin pump was disconnected and her DM regimen was optimized. Pt is stable and ready for discharge.    Discussion of discharge plan of care, including discharge diagnoses, medication reconciliation, and follow-ups was conducted with Dr. Ross on 2/10/25 and discharge was approved.       39 year old female with PMH HTN, DM, GERD, Asthma, MDD presenting for shortness of breath and cough that worsened over the last week. She recently had RSV/flu last week, went to urgent care but patient is unsure of diagnosis given.   Denies fevers/chills, abdominal pain, diarrhea/constipation or urinary sx.     Vitals in the ED: /86, HR 94, T 98.5, RR 16, SpO2 97% on 15L non rebreather => 96% on room air  Labs: WBC 10, Hgb 12.3, electrolytes within normal, alk phos 156, ALT 42  VBG: pH 7.45, pCO2 43, pO2 44, HCO3 30    In the ED, patient got Duoneb, Mg 2g and prednisone 60mg PO once.     Pt was admitted to medicine for further management. While admitted, pt had 2 RR called, one for seizure like activity and another for unresponsiveness with hypoxia. Suspecting psychogenic cause per neuro . Loop interrogation showed no events. CXR negative. Neuro followed and recommended EEG which showed no events. Pt awake, alert, eating and back to her baseline. Pt's glucose was elevated during her hospital stay, her insulin pump was disconnected and her DM regimen was optimized. Pt is stable and ready for discharge.    Discussion of discharge plan of care, including discharge diagnoses, medication reconciliation, and follow-ups was conducted with Dr. Ross on 2/10/25 and discharge was approved.

## 2025-02-07 NOTE — DISCHARGE NOTE PROVIDER - ATTENDING DISCHARGE PHYSICAL EXAMINATION:
GENERAL: NAD, lying in bed comfortably  HEAD:  Atraumatic, Normocephalic  EYES: conjunctiva and sclera clear  ENT: Moist mucous membranes  NECK: Supple, No JVD  CHEST/LUNG: minor wheeze,  Unlabored respirations  HEART: Regular rate and rhythm; No murmurs, rubs, or gallops  ABDOMEN: Soft, nontender, nondistended  EXTREMITIES:  No clubbing, cyanosis, or edema  NERVOUS SYSTEM:  A&Ox3

## 2025-02-07 NOTE — DISCHARGE NOTE PROVIDER - NSDCFUSCHEDAPPT_GEN_ALL_CORE_FT
Nely Martinez  [facility] Physician [facility]  GASTRO Doc Off [id number]  Scheduled Appointment: 02/10/2025    [facility]  Scheduled Appointment: 02/12/2025    Jc Curiel  [facility] Physician [facility]  PSYCHIATRY  [address]  Scheduled Appointment: 02/12/2025    Lo Magdaleno  [facility] Physician [facility]  ELECTROPH [address]  Scheduled Appointment: 02/21/2025    [facility]  CARDIOLOGY [address]  Scheduled Appointment: 03/24/2025    Jeyson Da Silva  [facility]  NEUROLOGY [address]  Scheduled Appointment: 03/24/2025    José Miguel Troy  [facility]  OTOLARYNG [address]  Scheduled Appointment: 04/11/2025     [facility] PreAdmits  Scheduled Appointment: 02/12/2025    Jc Curiel  [facility]  PSYCHIATRY  [place]  Scheduled Appointment: 02/12/2025    Lo Magdaleno  [facility]  ELECTROPH [address]  Scheduled Appointment: 02/21/2025    [facility]  CARDIOLOGY [address]  Scheduled Appointment: 03/24/2025    Jeyson Da Silva  [facility]  NEUROLOGY [address]  Scheduled Appointment: 03/24/2025    José Miguel Troy  [facility]  OTOLARYNG [address]  Scheduled Appointment: 04/11/2025

## 2025-02-07 NOTE — DISCHARGE NOTE PROVIDER - PROVIDER TOKENS
PROVIDER:[TOKEN:[[id number]],FOLLOWUP:[1 week]] PROVIDER:[TOKEN:[[id number]],FOLLOWUP:[1 week]],PROVIDER:[TOKEN:[[id number]],FOLLOWUP:[2 weeks],ESTABLISHEDPATIENT:[T]]

## 2025-02-07 NOTE — DISCHARGE NOTE PROVIDER - NSDCMRMEDTOKEN_GEN_ALL_CORE_FT
aspirin 81 mg oral tablet, chewable: 1 tab(s) orally once a day  atorvastatin 40 mg oral tablet: 1 tab(s) orally once a day (at bedtime)  benzonatate 200 mg oral capsule: 1 cap(s) orally every 8 hours as needed for  cough  famotidine 40 mg oral tablet: 1 tab(s) orally once a day  ferrous sulfate 324 mg (65 mg elemental iron) oral tablet: 1 tab(s) orally once a day  insulin regular: 2.45 unit(s) subcutaneous every hour As directed use for continous insulin pump  metoprolol tartrate 25 mg oral tablet: 1 tab(s) orally 3 times a day  Norvasc 5 mg oral tablet: 1 tab(s) orally once a day  Nurtec ODT 75 mg oral tablet, disintegratin tab(s) orally once a day as needed for  headache  omeprazole 40 mg oral delayed release capsule: 1 cap(s) orally once a day  sertraline 100 mg oral tablet: 1 tab(s) orally once a day x 14 days Continue to take as prescribed until told otherwise by your provider   albuterol 90 mcg/inh inhalation aerosol: 2 puff(s) inhaled every 6 hours as needed for  bronchospasm  aspirin 81 mg oral tablet, chewable: 1 tab(s) orally once a day  atorvastatin 40 mg oral tablet: 1 tab(s) orally once a day (at bedtime)  benzonatate 200 mg oral capsule: 1 cap(s) orally every 8 hours as needed for  cough  famotidine 40 mg oral tablet: 1 tab(s) orally once a day  ferrous sulfate 324 mg (65 mg elemental iron) oral tablet: 1 tab(s) orally once a day  insulin regular: 2.45 unit(s) subcutaneous every hour As directed use for continous insulin pump  metoprolol tartrate 25 mg oral tablet: 1 tab(s) orally 3 times a day  Norvasc 5 mg oral tablet: 1 tab(s) orally once a day  Nurtec ODT 75 mg oral tablet, disintegratin tab(s) orally once a day as needed for  headache  omeprazole 40 mg oral delayed release capsule: 1 cap(s) orally once a day  predniSONE 10 mg oral tablet: 1 tab(s) orally once a day from  till  then stop  predniSONE 20 mg oral tablet: 1 tab(s) orally once a day from  till 2/15  sertraline 100 mg oral tablet: 1 tab(s) orally once a day x 14 days Continue to take as prescribed until told otherwise by your provider  Symbicort 80 mcg-4.5 mcg/inh inhalation aerosol: 1 puff(s) inhaled 2 times a day

## 2025-02-07 NOTE — DISCHARGE NOTE PROVIDER - NSDCCPCAREPLAN_GEN_ALL_CORE_FT
PRINCIPAL DISCHARGE DIAGNOSIS  Diagnosis: Asthma exacerbation  Assessment and Plan of Treatment:       SECONDARY DISCHARGE DIAGNOSES  Diagnosis: Prolonged QT interval  Assessment and Plan of Treatment:      PRINCIPAL DISCHARGE DIAGNOSIS  Diagnosis: Asthma exacerbation  Assessment and Plan of Treatment: Asthma is a condition in which the airways tighten and narrow, making it difficult to breath. Asthma episodes, also called asthma attacks, range from minor to life-threatening. Symptoms include wheezing, coughing, chest tightness, or shortness of breath. The diagnosis of asthma is made by a review of your medical history and a physical exam, but may involve additional testing. Asthma cannot be cured, but medicines and lifestyle changes can help control it. Avoid triggers of asthma which may include animal dander, pollen, mold, smoke, air pollutants, etc.   SEEK IMMEDIATE MEDICAL CARE IF YOU HAVE ANY OF THE FOLLOWING SYMPTOMS: worsening of symptoms, shortness of breath at rest, chest pain, bluish discoloration to lips or fingertips, lightheadedness/dizziness, or fever.        SECONDARY DISCHARGE DIAGNOSES  Diagnosis: Prolonged QT interval  Assessment and Plan of Treatment:      PRINCIPAL DISCHARGE DIAGNOSIS  Diagnosis: Asthma exacerbation  Assessment and Plan of Treatment: Vic taking prednisone as prescribed, we also added a symbicort inhaler use as prescribed, follow up with your pcp and pulmonologist .   Asthma is a condition in which the airways tighten and narrow, making it difficult to breath. Asthma episodes, also called asthma attacks, range from minor to life-threatening. Symptoms include wheezing, coughing, chest tightness, or shortness of breath. The diagnosis of asthma is made by a review of your medical history and a physical exam, but may involve additional testing. Asthma cannot be cured, but medicines and lifestyle changes can help control it. Avoid triggers of asthma which may include animal dander, pollen, mold, smoke, air pollutants, etc.   SEEK IMMEDIATE MEDICAL CARE IF YOU HAVE ANY OF THE FOLLOWING SYMPTOMS: worsening of symptoms, shortness of breath at rest, chest pain, bluish discoloration to lips or fingertips, lightheadedness/dizziness, or fever.        SECONDARY DISCHARGE DIAGNOSES  Diagnosis: Prolonged QT interval  Assessment and Plan of Treatment:

## 2025-02-07 NOTE — PROGRESS NOTE ADULT - ASSESSMENT
39 year old female with PMH HTN, DM, GERD, Asthma, MDD presenting for shortness of breath and cough that worsened over the last week.   Denies fevers/chills, abdominal pain, diarrhea/constipation or urinary sx.     #Asthma exacerbation   * Recent RSV/Flu, possible post-viral bronchitis?  - Vitals in the ED: /86, HR 94, T 98.5, RR 16, SpO2 97% on 15L non rebreather => 96% on room air  - Labs: WBC 10, Hgb 12.3, electrolytes within normal, alk phos 156, ALT 42  - VBG: pH 7.45, pCO2 43, pO2 44, HCO3 30  - s/p Duoneb, Mg 2g and prednisone 60mg PO once in the ED   - Duoneb q6 and PRN  - Follow up RVP, procal, urine legionella, urine strep   - dec solumedrol to prednisone   - DC doxy, no pneumonia   - mild wheeze today    #Acute hypoxic resp failure 2/2 post ictal vs 2/2 ativan vs CELINE - resolving   #acute asthma exacerbation, no wheezing   - was placed on NRB , resolved  - ABG done first was mixed and repeat was stable   - pt woke up spontanouisly  - cxr stable     #Prolonged QTc   #sinus tach  - EKG: sinus tachy 110, QTc 514  - Avoid QT prolonging meds  - Repeat EKG in AM  - continue metoprol.   - pt tachy as did not get her metorpolol this am      #Seizure  #pseudoseizure   - had Rapid response in am   - was actively seizing , had ativan 2 mg intravenous x1   - pt seizure  resolved  - was post ictal after ativan   - Neuro consulted, veeg   - another rapid response happened as pt was still post ictal  and desated, during exam pt was refusing to open eyes and resisting,  but after multiple sternal rubs and telling the pt she would be intubated the pt spontaneously woke up and started having her lunch   - jesús pseudoseizure     - lactae stable   - mag 2.2  - cc consult was called but cancelled after waking up spontaneously  - neuro consult appreciated  - eeg neg     #HTN  - continue home meds     #DM hyperglycemia 2/2 solumedrol   - seen by Dr. Galvez back in early january. Keep pump settings at: 12am to 12pm 2.65U/hr, 12pm to 4pm 3.4U/hr, 4pm to 12am 3.65U/hr   - can keep her insulin pump for now  - Add ISS for coverage, target -180  - Monitor FS and adjust accordingly   - Check A1c, lipid panel  - will adjust insulin, dec solumedrol  - pt did not bolus herself today as she was given ativan during seizure  - given pt current mental status, will dc pump and start insulin regimen, dw endo they will review regimen   - trend bmp to r/o dka, ag closed, team to adjust insulin pump and pt give ss, BHB ordered   - BHB neg, adjusted insulin today, follow up endo today, still elevated    #MDD  #Anxiety  - c/w home sertraline    #Progress Note Handoff  Pending (specify):  clinical  improvement, hyperglycemia   Family discussion: house staff updated pt family  Disposition: piyush  Decision to admit the pt is based on acuity as above

## 2025-02-07 NOTE — EEG REPORT - NS EEG TEXT BOX
Brief Clinical History:  CRISTI MONTGOMERY is a 39 year old -; study performed to investigate for seizures or markers of epilepsy.   Diagnosis Code: R40.4 Transient alteration of awareness  CPT:  53452 (awake/sleep)     Patient Medication:  ATIVAN    NORVASC    ADMELOG    DUONEB      Acquisition Details:  Electroencephalography was acquired using a minimum of 21 channels on an [employer] system v 9.3.1 with electrode placement according to the standard International 10-20 system following ACNS (American Clinical Neurophysiology Society) guidelines.  Anterior temporal T1 and T2 electrodes were utilized whenever possible.   The [employer] automated spike & seizure detections were all reviewed in detail, in addition to the entire raw EEG.    Findings:  Background:  continuous.   Voltage:  Normal (20uV)  Organization:  organized sleep recording  Posterior Dominant Rhythm:  symmetrical sleep  Variability:  Yes	  Reactivity:  Yes  Sleep:  Asymmetric spindles and K complexes.  Focal abnormalities:  No persistent asymmetries of voltage or frequency.  Interictal Activity:  None  Location:    Focal Slowing:  None  Generalized Slowing:  No  Events:  1)	No electrographic seizures or significant clinical events.  Provocations:  1)	Hyperventilation: was not performed.  2)	Photic stimulation: was not performed.    Impression:  Normal     Clinical Correlation:  Normal study does not exclude diagnosis of seizure disorder    Domo Dior MD  Attending Neurologist, Division of Epilepsy

## 2025-02-07 NOTE — DISCHARGE NOTE PROVIDER - CARE PROVIDERS DIRECT ADDRESSES
,[email or web address]

## 2025-02-07 NOTE — DISCHARGE NOTE PROVIDER - CARE PROVIDER_API CALL
Martha Robertson  Internal Medicine  [address] [place] [address]  Phone: [phone]  Fax: [phone]  Follow Up Time: 1 week   Martha Robertson  Internal Medicine  [address] [place] [address]  Phone: [phone]  Fax: [phone]  Follow Up Time: 1 week    Wendy Funk  Pulmonary Disease  [address] [place] [address], [facility] [address]  [place] [address]  Phone: [phone]  Fax: [phone]  Established Patient  Follow Up Time: 2 weeks

## 2025-02-07 NOTE — PATIENT PROFILE ADULT - FUNCTIONAL ASSESSMENT - BASIC MOBILITY 6.
[facility]  [address] [place] [address]  Phone: [phone]  Fax: [phone] [facility]  [address], DO        April 27, 2020     Patient: Hawa Ford   YOB: 1967   Date of Visit: 4/27/2020       To Whom it May Concern:    Hawa Ford was seen in my clinic on 4/27/2020. She may return to work on 04/28/20. If you have any questions or concerns, please don't hesitate to call.     Sincerely,         Eliza Hook, DO 4-calculated by average/Not able to assess (calculate score using [facility] averaging method)

## 2025-02-07 NOTE — PATIENT PROFILE ADULT - FALL HARM RISK - HARM RISK INTERVENTIONS
Assistance with ambulation/Assistance OOB with selected safe patient handling equipment/Communicate Risk of Fall with Harm to all staff/Discuss with provider need for PT consult/Monitor gait and stability/Reinforce activity limits and safety measures with patient and family/Tailored Fall Risk Interventions/Visual Cue: Yellow wristband and red socks/Bed in lowest position, wheels locked, appropriate side rails in place/Call bell, personal items and telephone in reach/Instruct patient to call for assistance before getting out of bed or chair/Non-slip footwear when patient is out of bed/[place] to call system/Physically safe environment - no spills, clutter or unnecessary equipment/Purposeful Proactive Rounding/Room/bathroom lighting operational, light cord in reach

## 2025-02-07 NOTE — PROGRESS NOTE ADULT - ASSESSMENT
# type 1 DM   - FS reviewed , above target also on prednisone 40 mg daily   - received Lantus 50 units last night and extra 10 units this am   - increase Lantus 35 units BID   - increase lispro to 16 units TIDAC   - continue sliding scale 2: 50 > 150   - consider lowering steroids   - upon discharge can resume her pump

## 2025-02-08 LAB
ANION GAP SERPL CALC-SCNC: 10 MMOL/L — SIGNIFICANT CHANGE UP (ref 7–14)
BUN SERPL-MCNC: 13 MG/DL — SIGNIFICANT CHANGE UP (ref 10–20)
CALCIUM SERPL-MCNC: 9.6 MG/DL — SIGNIFICANT CHANGE UP (ref 8.4–10.5)
CHLORIDE SERPL-SCNC: 100 MMOL/L — SIGNIFICANT CHANGE UP (ref 98–110)
CO2 SERPL-SCNC: 25 MMOL/L — SIGNIFICANT CHANGE UP (ref 17–32)
CREAT SERPL-MCNC: 0.6 MG/DL — LOW (ref 0.7–1.5)
EGFR: 117 ML/MIN/1.73M2 — SIGNIFICANT CHANGE UP
GLUCOSE BLDC GLUCOMTR-MCNC: 271 MG/DL — HIGH (ref 70–99)
GLUCOSE BLDC GLUCOMTR-MCNC: 273 MG/DL — HIGH (ref 70–99)
GLUCOSE BLDC GLUCOMTR-MCNC: 297 MG/DL — HIGH (ref 70–99)
GLUCOSE BLDC GLUCOMTR-MCNC: 305 MG/DL — HIGH (ref 70–99)
GLUCOSE SERPL-MCNC: 295 MG/DL — HIGH (ref 70–99)
HCT VFR BLD CALC: 40.6 % — SIGNIFICANT CHANGE UP (ref 37–47)
HGB BLD-MCNC: 12.5 G/DL — SIGNIFICANT CHANGE UP (ref 12–16)
MCHC RBC-ENTMCNC: 25.3 PG — LOW (ref 27–31)
MCHC RBC-ENTMCNC: 30.8 G/DL — LOW (ref 32–37)
MCV RBC AUTO: 82 FL — SIGNIFICANT CHANGE UP (ref 81–99)
NRBC # BLD: 0 /100 WBCS — SIGNIFICANT CHANGE UP (ref 0–0)
NRBC BLD-RTO: 0 /100 WBCS — SIGNIFICANT CHANGE UP (ref 0–0)
PLATELET # BLD AUTO: 603 K/UL — HIGH (ref 130–400)
PMV BLD: 10.3 FL — SIGNIFICANT CHANGE UP (ref 7.4–10.4)
POTASSIUM SERPL-MCNC: 5 MMOL/L — SIGNIFICANT CHANGE UP (ref 3.5–5)
POTASSIUM SERPL-SCNC: 5 MMOL/L — SIGNIFICANT CHANGE UP (ref 3.5–5)
RBC # BLD: 4.95 M/UL — SIGNIFICANT CHANGE UP (ref 4.2–5.4)
RBC # FLD: 24 % — HIGH (ref 11.5–14.5)
SODIUM SERPL-SCNC: 135 MMOL/L — SIGNIFICANT CHANGE UP (ref 135–146)
WBC # BLD: 14.13 K/UL — HIGH (ref 4.8–10.8)
WBC # FLD AUTO: 14.13 K/UL — HIGH (ref 4.8–10.8)

## 2025-02-08 PROCEDURE — 99232 SBSQ HOSP IP/OBS MODERATE 35: CPT

## 2025-02-08 RX ORDER — PREDNISONE 5 MG/1
20 TABLET ORAL DAILY
Refills: 0 | Status: DISCONTINUED | OUTPATIENT
Start: 2025-02-09 | End: 2025-02-10

## 2025-02-08 RX ADMIN — Medication 5 MILLIGRAM(S): at 05:41

## 2025-02-08 RX ADMIN — Medication 16 UNIT(S): at 17:23

## 2025-02-08 RX ADMIN — INSULIN GLARGINE-YFGN 35 UNIT(S): 100 INJECTION, SOLUTION SUBCUTANEOUS at 21:26

## 2025-02-08 RX ADMIN — ASPIRIN 81 MILLIGRAM(S): 81 TABLET, COATED ORAL at 12:56

## 2025-02-08 RX ADMIN — INSULIN GLARGINE-YFGN 35 UNIT(S): 100 INJECTION, SOLUTION SUBCUTANEOUS at 09:55

## 2025-02-08 RX ADMIN — Medication 6: at 08:42

## 2025-02-08 RX ADMIN — IPRATROPIUM BROMIDE AND ALBUTEROL SULFATE 3 MILLILITER(S): .5; 2.5 SOLUTION RESPIRATORY (INHALATION) at 09:56

## 2025-02-08 RX ADMIN — Medication 6: at 17:24

## 2025-02-08 RX ADMIN — ATORVASTATIN CALCIUM 40 MILLIGRAM(S): 80 TABLET, FILM COATED ORAL at 21:26

## 2025-02-08 RX ADMIN — Medication 25 MILLIGRAM(S): at 14:00

## 2025-02-08 RX ADMIN — Medication 25 MILLIGRAM(S): at 05:42

## 2025-02-08 RX ADMIN — PREDNISONE 40 MILLIGRAM(S): 5 TABLET ORAL at 05:41

## 2025-02-08 RX ADMIN — Medication 25 MILLIGRAM(S): at 21:25

## 2025-02-08 RX ADMIN — Medication 100 MILLIGRAM(S): at 12:55

## 2025-02-08 RX ADMIN — Medication 6: at 12:09

## 2025-02-08 RX ADMIN — Medication 16 UNIT(S): at 08:42

## 2025-02-08 RX ADMIN — Medication 16 UNIT(S): at 12:10

## 2025-02-08 RX ADMIN — IPRATROPIUM BROMIDE AND ALBUTEROL SULFATE 3 MILLILITER(S): .5; 2.5 SOLUTION RESPIRATORY (INHALATION) at 19:21

## 2025-02-08 RX ADMIN — PANTOPRAZOLE 40 MILLIGRAM(S): 20 TABLET, DELAYED RELEASE ORAL at 05:41

## 2025-02-08 RX ADMIN — IPRATROPIUM BROMIDE AND ALBUTEROL SULFATE 3 MILLILITER(S): .5; 2.5 SOLUTION RESPIRATORY (INHALATION) at 14:28

## 2025-02-08 NOTE — PROGRESS NOTE ADULT - ASSESSMENT
39 year old female with PMH HTN, DM, GERD, Asthma, MDD presenting for shortness of breath and cough that worsened over the last week.   Denies fevers/chills, abdominal pain, diarrhea/constipation or urinary sx.     #Asthma exacerbation   * Recent RSV/Flu, possible post-viral bronchitis?  - Vitals in the ED: /86, HR 94, T 98.5, RR 16, SpO2 97% on 15L non rebreather => 96% on room air  - Labs: WBC 10, Hgb 12.3, electrolytes within normal, alk phos 156, ALT 42  - VBG: pH 7.45, pCO2 43, pO2 44, HCO3 30  - s/p Duoneb, Mg 2g and prednisone 60mg PO once in the ED   - Duoneb q6 and PRN  - Follow up RVP, procal, urine legionella, urine strep   - dec solumedrol to prednisone 40 mg, will decrease 20 mg po now, now further wheeze   - DC doxy, no pneumonia   - improving     #Acute hypoxic resp failure 2/2 post ictal vs 2/2 ativan vs CELINE - resolving   #acute asthma exacerbation, no wheezing   - was placed on NRB , resolved  - ABG done first was mixed and repeat was stable   - pt woke up spontaneously  - cxr stable     #Prolonged QTc   #sinus tach  - EKG: sinus tachy 110, QTc 514  - Avoid QT prolonging meds  - Repeat EKG in AM  - continue metoprol.   - pt tachy as did not get her metorpolol this am      #Seizure  #pseudoseizure   - had Rapid response in am   - was actively seizing , had ativan 2 mg intravenous x1   - pt seizure  resolved  - was post ictal after ativan   - Neuro consulted, veeg   - another rapid response happened as pt was still post ictal  and desated, during exam pt was refusing to open eyes and resisting,  but after multiple sternal rubs and telling the pt she would be intubated the pt spontaneously woke up and started having her lunch   - jesús pseudoseizure     - lactae stable   - mag 2.2  - cc consult was called but cancelled after waking up spontaneously  - neuro consult appreciated  - eeg neg     #HTN  - continue home meds     #DM hyperglycemia 2/2 solumedrol   - seen by Dr. Galvez back in early january. Keep pump settings at: 12am to 12pm 2.65U/hr, 12pm to 4pm 3.4U/hr, 4pm to 12am 3.65U/hr   - can keep her insulin pump for now  - Add ISS for coverage, target -180  - Monitor FS and adjust accordingly   - Check A1c, lipid panel  - will adjust insulin, dec solumedrol  - pt did not bolus herself today as she was given ativan during seizure  - given pt current mental status, will dc pump and start insulin regimen, dw endo they will review regimen   - trend bmp to r/o dka, ag closed, team to adjust insulin pump and pt give ss, BHB ordered   - BHB neg, adjusted insulin today, follow up endo today, still elevated  - Updated endo 2/7  - FS reviewed , above target also on prednisone 40 mg daily   - received Lantus 50 units last night and extra 10 units this am   - increase Lantus 35 units BID   - increase lispro to 16 units TIDAC   - continue sliding scale 2: 50 > 150   - upon discharge can resume her pump     #MDD  #Anxiety  - c/w home sertraline    #Progress Note Handoff  Pending (specify):  clinical  improvement, hyperglycemia   Family discussion: house staff updated pt family  Disposition: piyush   Decision to admit the pt is based on acuity as above

## 2025-02-09 LAB
GLUCOSE BLDC GLUCOMTR-MCNC: 155 MG/DL — HIGH (ref 70–99)
GLUCOSE BLDC GLUCOMTR-MCNC: 162 MG/DL — HIGH (ref 70–99)
GLUCOSE BLDC GLUCOMTR-MCNC: 183 MG/DL — HIGH (ref 70–99)
GLUCOSE BLDC GLUCOMTR-MCNC: 246 MG/DL — HIGH (ref 70–99)

## 2025-02-09 PROCEDURE — 99232 SBSQ HOSP IP/OBS MODERATE 35: CPT

## 2025-02-09 RX ADMIN — Medication 25 MILLIGRAM(S): at 21:09

## 2025-02-09 RX ADMIN — IPRATROPIUM BROMIDE AND ALBUTEROL SULFATE 3 MILLILITER(S): .5; 2.5 SOLUTION RESPIRATORY (INHALATION) at 19:20

## 2025-02-09 RX ADMIN — Medication 25 MILLIGRAM(S): at 05:47

## 2025-02-09 RX ADMIN — Medication 5 MILLIGRAM(S): at 05:46

## 2025-02-09 RX ADMIN — Medication 16 UNIT(S): at 08:05

## 2025-02-09 RX ADMIN — Medication 2: at 12:18

## 2025-02-09 RX ADMIN — PREDNISONE 20 MILLIGRAM(S): 5 TABLET ORAL at 05:46

## 2025-02-09 RX ADMIN — PANTOPRAZOLE 40 MILLIGRAM(S): 20 TABLET, DELAYED RELEASE ORAL at 05:47

## 2025-02-09 RX ADMIN — IPRATROPIUM BROMIDE AND ALBUTEROL SULFATE 3 MILLILITER(S): .5; 2.5 SOLUTION RESPIRATORY (INHALATION) at 14:52

## 2025-02-09 RX ADMIN — ASPIRIN 81 MILLIGRAM(S): 81 TABLET, COATED ORAL at 12:17

## 2025-02-09 RX ADMIN — IPRATROPIUM BROMIDE AND ALBUTEROL SULFATE 3 MILLILITER(S): .5; 2.5 SOLUTION RESPIRATORY (INHALATION) at 08:27

## 2025-02-09 RX ADMIN — Medication 100 MILLIGRAM(S): at 12:17

## 2025-02-09 RX ADMIN — Medication 16 UNIT(S): at 12:17

## 2025-02-09 RX ADMIN — Medication 25 MILLIGRAM(S): at 13:34

## 2025-02-09 RX ADMIN — Medication 4: at 17:01

## 2025-02-09 RX ADMIN — INSULIN GLARGINE-YFGN 35 UNIT(S): 100 INJECTION, SOLUTION SUBCUTANEOUS at 21:12

## 2025-02-09 RX ADMIN — IPRATROPIUM BROMIDE AND ALBUTEROL SULFATE 3 MILLILITER(S): .5; 2.5 SOLUTION RESPIRATORY (INHALATION) at 01:54

## 2025-02-09 RX ADMIN — INSULIN GLARGINE-YFGN 35 UNIT(S): 100 INJECTION, SOLUTION SUBCUTANEOUS at 12:19

## 2025-02-09 RX ADMIN — Medication 2: at 08:05

## 2025-02-09 RX ADMIN — Medication 16 UNIT(S): at 17:01

## 2025-02-09 RX ADMIN — ATORVASTATIN CALCIUM 40 MILLIGRAM(S): 80 TABLET, FILM COATED ORAL at 21:09

## 2025-02-09 NOTE — PROGRESS NOTE ADULT - SUBJECTIVE AND OBJECTIVE BOX
Patient is a 39y old  Female who presents with a chief complaint of     Pt seen and examined at bedside. No CP or SOB.      ABG - ( 2025 12:22 )  pH: 7.40  /  pCO2: 37    /  pO2: 65    / HCO3: 23    / Base Excess: -1.6  /  SaO2: 93.0                PAST MEDICAL & SURGICAL HISTORY:  Neuropathy  right lower extremity, vaginal    Type 1 diabetes    Degenerative disc disease, thoracic    Urinary tract infection, recurrent    Gastroesophageal reflux disease, esophagitis presence not specified    Intractable headache, unspecified chronicity pattern, unspecified headache type    Tremor of right hand    Tachycardia    Spinal stenosis    No significant past surgical history        VITAL SIGNS (Last 24 hrs):  T(C): 36.4 (25 @ 07:59), Max: 36.9 (25 @ 19:12)  HR: 117 (25 @ 12:12) (94 - 117)  BP: 126/64 (25 @ 12:12) (116/58 - 141/86)  RR: 18 (25 @ 12:12) (16 - 18)  SpO2: 87% (25 @ 12:12) (87% - 97%)  Wt(kg): --  Daily Height in cm: 162.56 (2025 19:12)    Daily     I&O's Summary      PHYSICAL EXAM:  GENERAL: NAD, well-developed  HEAD:  Atraumatic, Normocephalic  EYES: EOMI, PERRLA, conjunctiva and sclera clear  NECK: Supple, No JVD  CHEST/LUNG: Clear to auscultation bilaterally; No wheeze  HEART: Regular rate and rhythm; No murmurs, rubs, or gallops  ABDOMEN: Soft, Nontender, Nondistended; Bowel sounds present  EXTREMITIES:  2+ Peripheral Pulses, No clubbing, cyanosis, or edema  PSYCH: AAOx3  NEUROLOGY: non-focal  SKIN: No rashes or lesions    Labs Reviewed  Spoke to patient in regards to abnormal labs.    CBC Full  -  ( 2025 07:37 )  WBC Count : 13.13 K/uL  Hemoglobin : 12.6 g/dL  Hematocrit : 40.4 %  Platelet Count - Automated : 599 K/uL  Mean Cell Volume : 80.8 fL  Mean Cell Hemoglobin : 25.2 pg  Mean Cell Hemoglobin Concentration : 31.2 g/dL  Auto Neutrophil # : 12.02 K/uL  Auto Lymphocyte # : 0.91 K/uL  Auto Monocyte # : 0.07 K/uL  Auto Eosinophil # : 0.01 K/uL  Auto Basophil # : 0.02 K/uL  Auto Neutrophil % : 91.5 %  Auto Lymphocyte % : 6.9 %  Auto Monocyte % : 0.5 %  Auto Eosinophil % : 0.1 %  Auto Basophil % : 0.2 %    BMP:     @ 07:37    Blood Urea Nitrogen - 10  Calcium - 9.0  Carbond Dioxide - 19  Chloride - 102  Creatinine - 0.5  Glucose - 396  Potassium - 5.4  Sodium - 135      Hemoglobin A1c -     Urine Culture:        COVID Labs  CRP:      D-Dimer:            Imaging reviewed independently and reviewed read    < from: Xray Chest 1 View-PORTABLE IMMEDIATE (Xray Chest 1 View-PORTABLE IMMEDIATE .) (25 @ 10:37) >    IMPRESSION:    No radiographic evidence of acute cardiopulmonary disease.    < end of copied text >        MEDICATIONS  (STANDING):  albuterol    90 MICROgram(s) HFA Inhaler 2 Puff(s) Inhalation every 6 hours  albuterol/ipratropium for Nebulization 3 milliLiter(s) Nebulizer every 6 hours  amLODIPine   Tablet 5 milliGRAM(s) Oral daily  aspirin  chewable 81 milliGRAM(s) Oral daily  atorvastatin 40 milliGRAM(s) Oral at bedtime  dextrose 5%. 1000 milliLiter(s) (100 mL/Hr) IV Continuous <Continuous>  dextrose 5%. 1000 milliLiter(s) (50 mL/Hr) IV Continuous <Continuous>  dextrose 50% Injectable 25 Gram(s) IV Push once  dextrose 50% Injectable 12.5 Gram(s) IV Push once  dextrose 50% Injectable 25 Gram(s) IV Push once  glucagon  Injectable 1 milliGRAM(s) IntraMuscular once  insulin lispro (ADMELOG) corrective regimen sliding scale   SubCutaneous three times a day before meals  methylPREDNISolone sodium succinate Injectable 40 milliGRAM(s) IV Push every 12 hours  metoprolol tartrate 25 milliGRAM(s) Oral three times a day  pantoprazole    Tablet 40 milliGRAM(s) Oral before breakfast  sertraline 100 milliGRAM(s) Oral daily    MEDICATIONS  (PRN):  dextrose Oral Gel 15 Gram(s) Oral once PRN Blood Glucose LESS THAN 70 milliGRAM(s)/deciliter      Home Medications:  atorvastatin 40 mg oral tablet: 1 tab(s) orally once a day (at bedtime) (2025 03:29)  famotidine 40 mg oral tablet: 1 tab(s) orally once a day (2025 03:29)  ferrous sulfate 324 mg (65 mg elemental iron) oral tablet: 1 tab(s) orally once a day (2025 03:28)  insulin regular: 2.45 unit(s) subcutaneous every hour As directed use for continous insulin pump (2025 03:29)  metoprolol tartrate 25 mg oral tablet: 1 tab(s) orally 3 times a day (2025 03:29)  Norvasc 5 mg oral tablet: 1 tab(s) orally once a day (2025 03:29)  Nurtec ODT 75 mg oral tablet, disintegratin tab(s) orally once a day as needed for  headache (2025 03:29)  omeprazole 40 mg oral delayed release capsule: 1 cap(s) orally once a day (2025 03:29)      
S: FS reviewed hyperglycemia noted, she reports eating better   O:Vital Signs Last 24 Hrs  T(C): 36.9 (07 Feb 2025 15:34), Max: 36.9 (07 Feb 2025 15:34)  T(F): 98.4 (07 Feb 2025 15:34), Max: 98.4 (07 Feb 2025 15:34)  HR: 97 (07 Feb 2025 15:34) (79 - 108)  BP: 123/66 (07 Feb 2025 15:34) (105/54 - 139/62)  BP(mean): 77 (06 Feb 2025 23:59) (77 - 89)  RR: 18 (07 Feb 2025 15:34) (18 - 18)  SpO2: 97% (07 Feb 2025 15:34) (97% - 99%)    Parameters below as of 07 Feb 2025 15:34  Patient On (Oxygen Delivery Method): room air        CAPILLARY BLOOD GLUCOSE      POCT Blood Glucose.: 371 mg/dL (07 Feb 2025 11:08)  POCT Blood Glucose.: 404 mg/dL (07 Feb 2025 08:44)  POCT Blood Glucose.: 257 mg/dL (06 Feb 2025 21:25)  POCT Blood Glucose.: 279 mg/dL (06 Feb 2025 17:51)  POCT Blood Glucose.: 295 mg/dL (06 Feb 2025 16:43)      MEDICATIONS  (STANDING):  albuterol    90 MICROgram(s) HFA Inhaler 2 Puff(s) Inhalation every 6 hours  albuterol/ipratropium for Nebulization 3 milliLiter(s) Nebulizer every 6 hours  amLODIPine   Tablet 5 milliGRAM(s) Oral daily  aspirin  chewable 81 milliGRAM(s) Oral daily  atorvastatin 40 milliGRAM(s) Oral at bedtime  dextrose 5%. 1000 milliLiter(s) (100 mL/Hr) IV Continuous <Continuous>  dextrose 5%. 1000 milliLiter(s) (50 mL/Hr) IV Continuous <Continuous>  dextrose 50% Injectable 25 Gram(s) IV Push once  dextrose 50% Injectable 12.5 Gram(s) IV Push once  dextrose 50% Injectable 25 Gram(s) IV Push once  glucagon  Injectable 1 milliGRAM(s) IntraMuscular once  insulin glargine Injectable (LANTUS) 10 Unit(s) SubCutaneous every morning  insulin glargine Injectable (LANTUS) 20 Unit(s) SubCutaneous at bedtime  insulin lispro (ADMELOG) corrective regimen sliding scale   SubCutaneous three times a day before meals  insulin lispro Injectable (ADMELOG) 10 Unit(s) SubCutaneous three times a day before meals  metoprolol tartrate 25 milliGRAM(s) Oral three times a day  pantoprazole    Tablet 40 milliGRAM(s) Oral before breakfast  predniSONE   Tablet 40 milliGRAM(s) Oral daily  sertraline 100 milliGRAM(s) Oral daily    MEDICATIONS  (PRN):  dextrose Oral Gel 15 Gram(s) Oral once PRN Blood Glucose LESS THAN 70 milliGRAM(s)/deciliter  
Patient is a 39y old  Female who presents with a chief complaint of     Pt seen and examined at bedside. No CP or SOB.      ABG - ( 06 Feb 2025 12:22 )  pH: 7.40  /  pCO2: 37    /  pO2: 65    / HCO3: 23    / Base Excess: -1.6  /  SaO2: 93.0            PAST MEDICAL & SURGICAL HISTORY:  Neuropathy  right lower extremity, vaginal    Type 1 diabetes    Degenerative disc disease, thoracic    Urinary tract infection, recurrent    Gastroesophageal reflux disease, esophagitis presence not specified    Intractable headache, unspecified chronicity pattern, unspecified headache type    Tremor of right hand    Tachycardia    Spinal stenosis    No significant past surgical history        VITAL SIGNS (Last 24 hrs):  T(C): 36.7 (02-06-25 @ 23:59), Max: 36.7 (02-06-25 @ 23:59)  HR: 91 (02-07-25 @ 06:00) (79 - 108)  BP: 118/58 (02-07-25 @ 06:00) (105/54 - 152/70)  RR: 18 (02-06-25 @ 15:54) (18 - 18)  SpO2: 99% (02-06-25 @ 23:59) (94% - 99%)  Wt(kg): --  Daily     Daily     I&O's Summary      PHYSICAL EXAM:  GENERAL: NAD, well-developed  HEAD:  Atraumatic, Normocephalic  EYES: EOMI, PERRLA, conjunctiva and sclera clear  NECK: Supple, No JVD  CHEST/LUNG: mild wheeze   HEART: Regular rate and rhythm; No murmurs, rubs, or gallops  ABDOMEN: Soft, Nontender, Nondistended; Bowel sounds present  EXTREMITIES:  2+ Peripheral Pulses, No clubbing, cyanosis, or edema  PSYCH: AAOx3  NEUROLOGY: non-focal  SKIN: No rashes or lesions    Labs Reviewed  Spoke to patient in regards to abnormal labs.    CBC Full  -  ( 07 Feb 2025 07:04 )  WBC Count : 13.93 K/uL  Hemoglobin : 11.6 g/dL  Hematocrit : 38.3 %  Platelet Count - Automated : 564 K/uL  Mean Cell Volume : 82.7 fL  Mean Cell Hemoglobin : 25.1 pg  Mean Cell Hemoglobin Concentration : 30.3 g/dL  Auto Neutrophil # : x  Auto Lymphocyte # : x  Auto Monocyte # : x  Auto Eosinophil # : x  Auto Basophil # : x  Auto Neutrophil % : x  Auto Lymphocyte % : x  Auto Monocyte % : x  Auto Eosinophil % : x  Auto Basophil % : x    BMP:    02-07 @ 07:04    Blood Urea Nitrogen - 15  Calcium - 9.2  Carbond Dioxide - 23  Chloride - 99  Creatinine - 0.7  Glucose - 434  Potassium - 5.1  Sodium - 134      Hemoglobin A1c -     Urine Culture:        COVID Labs  CRP:    Procalcitonin: <0.02 ng/mL (02-06-25 @ 07:37)    D-Dimer:      Imaging reviewed independently and reviewed read    < from: Xray Chest 1 View-PORTABLE IMMEDIATE (Xray Chest 1 View-PORTABLE IMMEDIATE .) (02.06.25 @ 10:37) >  IMPRESSION:    No radiographic evidence of acute cardiopulmonary disease.    < end of copied text >      MEDICATIONS  (STANDING):  albuterol    90 MICROgram(s) HFA Inhaler 2 Puff(s) Inhalation every 6 hours  albuterol/ipratropium for Nebulization 3 milliLiter(s) Nebulizer every 6 hours  amLODIPine   Tablet 5 milliGRAM(s) Oral daily  aspirin  chewable 81 milliGRAM(s) Oral daily  atorvastatin 40 milliGRAM(s) Oral at bedtime  dextrose 5%. 1000 milliLiter(s) (100 mL/Hr) IV Continuous <Continuous>  dextrose 5%. 1000 milliLiter(s) (50 mL/Hr) IV Continuous <Continuous>  dextrose 50% Injectable 25 Gram(s) IV Push once  dextrose 50% Injectable 12.5 Gram(s) IV Push once  dextrose 50% Injectable 25 Gram(s) IV Push once  glucagon  Injectable 1 milliGRAM(s) IntraMuscular once  insulin glargine Injectable (LANTUS) 10 Unit(s) SubCutaneous every morning  insulin glargine Injectable (LANTUS) 20 Unit(s) SubCutaneous at bedtime  insulin lispro (ADMELOG) corrective regimen sliding scale   SubCutaneous three times a day before meals  insulin lispro Injectable (ADMELOG) 10 Unit(s) SubCutaneous three times a day before meals  metoprolol tartrate 25 milliGRAM(s) Oral three times a day  pantoprazole    Tablet 40 milliGRAM(s) Oral before breakfast  predniSONE   Tablet 40 milliGRAM(s) Oral daily  sertraline 100 milliGRAM(s) Oral daily    MEDICATIONS  (PRN):  dextrose Oral Gel 15 Gram(s) Oral once PRN Blood Glucose LESS THAN 70 milliGRAM(s)/deciliter      
Patient is a 39y old  Female who presents with a chief complaint of     Pt seen and examined at bedside. No CP or SOB.          PAST MEDICAL & SURGICAL HISTORY:  Neuropathy  right lower extremity, vaginal    Type 1 diabetes    Degenerative disc disease, thoracic    Urinary tract infection, recurrent    Gastroesophageal reflux disease, esophagitis presence not specified    Intractable headache, unspecified chronicity pattern, unspecified headache type    Tremor of right hand    Tachycardia    Spinal stenosis    No significant past surgical history        VITAL SIGNS (Last 24 hrs):  T(C): 36.4 (02-08-25 @ 07:48), Max: 36.9 (02-07-25 @ 15:34)  HR: 77 (02-08-25 @ 07:48) (77 - 97)  BP: 117/70 (02-08-25 @ 07:48) (108/65 - 129/77)  RR: 18 (02-08-25 @ 07:48) (18 - 18)  SpO2: 98% (02-08-25 @ 07:48) (97% - 99%)  Wt(kg): --  Daily     Daily     I&O's Summary      PHYSICAL EXAM:  GENERAL: NAD, well-developed  HEAD:  Atraumatic, Normocephalic  EYES: EOMI, PERRLA, conjunctiva and sclera clear  NECK: Supple, No JVD  CHEST/LUNG: Clear to auscultation bilaterally; No wheeze  HEART: Regular rate and rhythm; No murmurs, rubs, or gallops  ABDOMEN: Soft, Nontender, Nondistended; Bowel sounds present  EXTREMITIES:  2+ Peripheral Pulses, No clubbing, cyanosis, or edema  PSYCH: AAOx3  NEUROLOGY: non-focal  SKIN: No rashes or lesions    Labs Reviewed  Spoke to patient in regards to abnormal labs.    CBC Full  -  ( 08 Feb 2025 11:19 )  WBC Count : 14.13 K/uL  Hemoglobin : 12.5 g/dL  Hematocrit : 40.6 %  Platelet Count - Automated : 603 K/uL  Mean Cell Volume : 82.0 fL  Mean Cell Hemoglobin : 25.3 pg  Mean Cell Hemoglobin Concentration : 30.8 g/dL  Auto Neutrophil # : x  Auto Lymphocyte # : x  Auto Monocyte # : x  Auto Eosinophil # : x  Auto Basophil # : x  Auto Neutrophil % : x  Auto Lymphocyte % : x  Auto Monocyte % : x  Auto Eosinophil % : x  Auto Basophil % : x    BMP:    02-08 @ 11:19    Blood Urea Nitrogen - 13  Calcium - 9.6  Carbond Dioxide - 25  Chloride - 100  Creatinine - 0.6  Glucose - 295  Potassium - 5.0  Sodium - 135      Hemoglobin A1c -     Urine Culture:        COVID Labs  CRP:    Procalcitonin: <0.02 ng/mL (02-06-25 @ 07:37)    D-Dimer:            Imaging reviewed independently and reviewed read        MEDICATIONS  (STANDING):  albuterol    90 MICROgram(s) HFA Inhaler 2 Puff(s) Inhalation every 6 hours  albuterol/ipratropium for Nebulization 3 milliLiter(s) Nebulizer every 6 hours  amLODIPine   Tablet 5 milliGRAM(s) Oral daily  aspirin  chewable 81 milliGRAM(s) Oral daily  atorvastatin 40 milliGRAM(s) Oral at bedtime  dextrose 5%. 1000 milliLiter(s) (100 mL/Hr) IV Continuous <Continuous>  dextrose 5%. 1000 milliLiter(s) (50 mL/Hr) IV Continuous <Continuous>  dextrose 50% Injectable 25 Gram(s) IV Push once  dextrose 50% Injectable 12.5 Gram(s) IV Push once  dextrose 50% Injectable 25 Gram(s) IV Push once  glucagon  Injectable 1 milliGRAM(s) IntraMuscular once  insulin glargine Injectable (LANTUS) 35 Unit(s) SubCutaneous every morning  insulin glargine Injectable (LANTUS) 35 Unit(s) SubCutaneous at bedtime  insulin lispro (ADMELOG) corrective regimen sliding scale   SubCutaneous three times a day before meals  insulin lispro Injectable (ADMELOG) 16 Unit(s) SubCutaneous three times a day before meals  metoprolol tartrate 25 milliGRAM(s) Oral three times a day  pantoprazole    Tablet 40 milliGRAM(s) Oral before breakfast  predniSONE   Tablet 40 milliGRAM(s) Oral daily  sertraline 100 milliGRAM(s) Oral daily    MEDICATIONS  (PRN):  dextrose Oral Gel 15 Gram(s) Oral once PRN Blood Glucose LESS THAN 70 milliGRAM(s)/deciliter      
Patient is a 39y old  Female who presents with a chief complaint of     Pt seen and examined at bedside. No CP or SOB.          PAST MEDICAL & SURGICAL HISTORY:  Neuropathy  right lower extremity, vaginal    Type 1 diabetes    Degenerative disc disease, thoracic    Urinary tract infection, recurrent    Gastroesophageal reflux disease, esophagitis presence not specified    Intractable headache, unspecified chronicity pattern, unspecified headache type    Tremor of right hand    Tachycardia    Spinal stenosis    No significant past surgical history        VITAL SIGNS (Last 24 hrs):  T(C): 37 (02-09-25 @ 13:05), Max: 37 (02-09-25 @ 13:05)  HR: 97 (02-09-25 @ 13:05) (77 - 99)  BP: 120/73 (02-09-25 @ 13:05) (107/66 - 130/73)  RR: 18 (02-09-25 @ 13:05) (18 - 18)  SpO2: 94% (02-09-25 @ 13:05) (94% - 99%)  Wt(kg): --  Daily Height in cm: 162.56 (08 Feb 2025 17:01)    Daily     I&O's Summary    09 Feb 2025 07:01  -  09 Feb 2025 14:13  --------------------------------------------------------  IN: 222 mL / OUT: 0 mL / NET: 222 mL        PHYSICAL EXAM:  GENERAL: NAD, well-developed  HEAD:  Atraumatic, Normocephalic  EYES: EOMI, PERRLA, conjunctiva and sclera clear  NECK: Supple, No JVD  CHEST/LUNG: Clear to auscultation bilaterally; No wheeze  HEART: Regular rate and rhythm; No murmurs, rubs, or gallops  ABDOMEN: Soft, Nontender, Nondistended; Bowel sounds present  EXTREMITIES:  2+ Peripheral Pulses, No clubbing, cyanosis, or edema  PSYCH: AAOx3  NEUROLOGY: non-focal  SKIN: No rashes or lesions    Labs Reviewed  Spoke to patient in regards to abnormal labs.    CBC Full  -  ( 08 Feb 2025 11:19 )  WBC Count : 14.13 K/uL  Hemoglobin : 12.5 g/dL  Hematocrit : 40.6 %  Platelet Count - Automated : 603 K/uL  Mean Cell Volume : 82.0 fL  Mean Cell Hemoglobin : 25.3 pg  Mean Cell Hemoglobin Concentration : 30.8 g/dL  Auto Neutrophil # : x  Auto Lymphocyte # : x  Auto Monocyte # : x  Auto Eosinophil # : x  Auto Basophil # : x  Auto Neutrophil % : x  Auto Lymphocyte % : x  Auto Monocyte % : x  Auto Eosinophil % : x  Auto Basophil % : x    BMP:    02-08 @ 11:19    Blood Urea Nitrogen - 13  Calcium - 9.6  Carbond Dioxide - 25  Chloride - 100  Creatinine - 0.6  Glucose - 295  Potassium - 5.0  Sodium - 135      Hemoglobin A1c -     Urine Culture:        COVID Labs  CRP:    Procalcitonin: <0.02 ng/mL (02-06-25 @ 07:37)    D-Dimer:            Imaging reviewed independently and reviewed read        MEDICATIONS  (STANDING):  albuterol    90 MICROgram(s) HFA Inhaler 2 Puff(s) Inhalation every 6 hours  albuterol/ipratropium for Nebulization 3 milliLiter(s) Nebulizer every 6 hours  amLODIPine   Tablet 5 milliGRAM(s) Oral daily  aspirin  chewable 81 milliGRAM(s) Oral daily  atorvastatin 40 milliGRAM(s) Oral at bedtime  dextrose 5%. 1000 milliLiter(s) (100 mL/Hr) IV Continuous <Continuous>  dextrose 5%. 1000 milliLiter(s) (50 mL/Hr) IV Continuous <Continuous>  dextrose 50% Injectable 25 Gram(s) IV Push once  dextrose 50% Injectable 12.5 Gram(s) IV Push once  dextrose 50% Injectable 25 Gram(s) IV Push once  glucagon  Injectable 1 milliGRAM(s) IntraMuscular once  insulin glargine Injectable (LANTUS) 35 Unit(s) SubCutaneous every morning  insulin glargine Injectable (LANTUS) 35 Unit(s) SubCutaneous at bedtime  insulin lispro (ADMELOG) corrective regimen sliding scale   SubCutaneous three times a day before meals  insulin lispro Injectable (ADMELOG) 16 Unit(s) SubCutaneous three times a day before meals  metoprolol tartrate 25 milliGRAM(s) Oral three times a day  pantoprazole    Tablet 40 milliGRAM(s) Oral before breakfast  predniSONE   Tablet 20 milliGRAM(s) Oral daily  sertraline 100 milliGRAM(s) Oral daily    MEDICATIONS  (PRN):  dextrose Oral Gel 15 Gram(s) Oral once PRN Blood Glucose LESS THAN 70 milliGRAM(s)/deciliter

## 2025-02-09 NOTE — PROGRESS NOTE ADULT - ASSESSMENT
39 year old female with PMH HTN, DM, GERD, Asthma, MDD presenting for shortness of breath and cough that worsened over the last week.   Denies fevers/chills, abdominal pain, diarrhea/constipation or urinary sx.     #Asthma exacerbation   * Recent RSV/Flu, possible post-viral bronchitis?  - Vitals in the ED: /86, HR 94, T 98.5, RR 16, SpO2 97% on 15L non rebreather => 96% on room air  - Labs: WBC 10, Hgb 12.3, electrolytes within normal, alk phos 156, ALT 42  - VBG: pH 7.45, pCO2 43, pO2 44, HCO3 30  - s/p Duoneb, Mg 2g and prednisone 60mg PO once in the ED   - Duoneb q6 and PRN  - Follow up RVP, procal, urine legionella, urine strep   - dec solumedrol to prednisone 40 mg, will decrease 20 mg po now, now further wheeze   - DC doxy, no pneumonia   - improving     #Acute hypoxic resp failure 2/2 post ictal vs 2/2 ativan vs CELINE - resolving   #acute asthma exacerbation, no wheezing   - was placed on NRB , resolved  - ABG done first was mixed and repeat was stable   - pt woke up spontaneously  - cxr stable     #Prolonged QTc   #sinus tach  - EKG: sinus tachy 110, QTc 514  - Avoid QT prolonging meds  - Repeat EKG in AM  - continue metoprol.   - pt tachy as did not get her metorpolol this am      #Seizure  #pseudoseizure   - had Rapid response in am   - was actively seizing , had ativan 2 mg intravenous x1   - pt seizure  resolved  - was post ictal after ativan   - Neuro consulted, veeg   - another rapid response happened as pt was still post ictal  and desated, during exam pt was refusing to open eyes and resisting,  but after multiple sternal rubs and telling the pt she would be intubated the pt spontaneously woke up and started having her lunch   - jesús pseudoseizure     - lactae stable   - mag 2.2  - cc consult was called but cancelled after waking up spontaneously  - neuro consult appreciated  - eeg neg     #HTN  - continue home meds     #DM hyperglycemia 2/2 solumedrol   - seen by Dr. Galvez back in early january. Keep pump settings at: 12am to 12pm 2.65U/hr, 12pm to 4pm 3.4U/hr, 4pm to 12am 3.65U/hr   - can keep her insulin pump for now  - Add ISS for coverage, target -180  - Monitor FS and adjust accordingly   - Check A1c, lipid panel  - will adjust insulin, dec solumedrol  - pt did not bolus herself today as she was given ativan during seizure  - given pt current mental status, will dc pump and start insulin regimen, dw endo they will review regimen   - trend bmp to r/o dka, ag closed, team to adjust insulin pump and pt give ss, BHB ordered   - BHB neg, adjusted insulin today, follow up endo today, still elevated  - Updated endo 2/7  - FS reviewed , above target also on prednisone 40 mg daily   - received Lantus 50 units last night and extra 10 units this am   - increase Lantus 35 units BID   - increase lispro to 16 units TIDAC   - continue sliding scale 2: 50 > 150   - upon discharge can resume her pump     #MDD  #Anxiety  - c/w home sertraline    #Progress Note Handoff  Pending (specify): mainers in am   Family discussion: house staff updated pt family  Disposition: [facility]   Decision to admit the pt is based on acuity as above

## 2025-02-10 ENCOUNTER — APPOINTMENT (OUTPATIENT)
Dept: GASTROENTEROLOGY | Facility: CLINIC | Age: 40
End: 2025-02-10

## 2025-02-10 ENCOUNTER — TRANSCRIPTION ENCOUNTER (OUTPATIENT)
Age: 40
End: 2025-02-10

## 2025-02-10 VITALS
DIASTOLIC BLOOD PRESSURE: 66 MMHG | TEMPERATURE: 98 F | HEART RATE: 93 BPM | SYSTOLIC BLOOD PRESSURE: 113 MMHG | RESPIRATION RATE: 18 BRPM

## 2025-02-10 LAB — GLUCOSE BLDC GLUCOMTR-MCNC: 93 MG/DL — SIGNIFICANT CHANGE UP (ref 70–99)

## 2025-02-10 PROCEDURE — 99239 HOSP IP/OBS DSCHRG MGMT >30: CPT

## 2025-02-10 RX ORDER — ALBUTEROL 90 MCG
2 AEROSOL REFILL (GRAM) INHALATION
Qty: 1 | Refills: 0
Start: 2025-02-10 | End: 2025-03-11

## 2025-02-10 RX ORDER — BUDESONIDE AND FORMOTEROL FUMARATE DIHYDRATE 80; 4.5 UG/1; UG/1
1 AEROSOL RESPIRATORY (INHALATION)
Qty: 1 | Refills: 0
Start: 2025-02-10 | End: 2025-03-11

## 2025-02-10 RX ORDER — ANTISEPTIC SURGICAL SCRUB 0.04 MG/ML
1 SOLUTION TOPICAL
Refills: 0 | Status: DISCONTINUED | OUTPATIENT
Start: 2025-02-10 | End: 2025-02-10

## 2025-02-10 RX ORDER — PREDNISONE 5 MG/1
1 TABLET ORAL
Qty: 5 | Refills: 0
Start: 2025-02-10 | End: 2025-02-14

## 2025-02-10 RX ADMIN — INSULIN GLARGINE-YFGN 35 UNIT(S): 100 INJECTION, SOLUTION SUBCUTANEOUS at 11:32

## 2025-02-10 RX ADMIN — Medication 25 MILLIGRAM(S): at 15:04

## 2025-02-10 RX ADMIN — ANTISEPTIC SURGICAL SCRUB 1 APPLICATION(S): 0.04 SOLUTION TOPICAL at 11:36

## 2025-02-10 RX ADMIN — Medication 25 MILLIGRAM(S): at 05:30

## 2025-02-10 RX ADMIN — PANTOPRAZOLE 40 MILLIGRAM(S): 20 TABLET, DELAYED RELEASE ORAL at 05:31

## 2025-02-10 RX ADMIN — PREDNISONE 20 MILLIGRAM(S): 5 TABLET ORAL at 05:30

## 2025-02-10 RX ADMIN — IPRATROPIUM BROMIDE AND ALBUTEROL SULFATE 3 MILLILITER(S): .5; 2.5 SOLUTION RESPIRATORY (INHALATION) at 08:53

## 2025-02-10 RX ADMIN — Medication 16 UNIT(S): at 11:32

## 2025-02-10 RX ADMIN — Medication 5 MILLIGRAM(S): at 05:30

## 2025-02-10 RX ADMIN — Medication 6: at 11:32

## 2025-02-10 RX ADMIN — Medication 16 UNIT(S): at 08:07

## 2025-02-10 RX ADMIN — ASPIRIN 81 MILLIGRAM(S): 81 TABLET, COATED ORAL at 11:31

## 2025-02-10 RX ADMIN — IPRATROPIUM BROMIDE AND ALBUTEROL SULFATE 3 MILLILITER(S): .5; 2.5 SOLUTION RESPIRATORY (INHALATION) at 13:45

## 2025-02-10 RX ADMIN — Medication 100 MILLIGRAM(S): at 11:31

## 2025-02-10 NOTE — DISCHARGE NOTE NURSING/CASE MANAGEMENT/SOCIAL WORK - NSDCPEFALRISK_GEN_ALL_CORE
For information on Fall & Injury Prevention, visit: [email or web address] OR  [email or web address] OR  https://www.cdc.gov/steadi/patient.html

## 2025-02-10 NOTE — CHART NOTE - NSCHARTNOTEFT_GEN_A_CORE
DISCHARGE NOTE>>>     CRISTI MONTGOMERY  MRN-[id number]    VITAL SIGNS:  T(F): 97.3 (02-10-25 @ 04:03), Max: 98.6 (02-09-25 @ 13:05)  HR: 64 (02-10-25 @ 04:03)  BP: 124/77 (02-10-25 @ 04:03)  SpO2: 97% (02-10-25 @ 09:42)      PHYSICAL EXAMINATION:  GENERAL: NAD, lying in bed comfortably  HEAD:  Atraumatic, Normocephalic  EYES: conjunctiva and sclera clear  ENT: Moist mucous membranes  NECK: Supple, No JVD  CHEST/LUNG: minor wheeze. Unlabored respirations  HEART: Regular rate and rhythm; No murmurs, rubs, or gallops  ABDOMEN: Soft, nontender, nondistended  EXTREMITIES:  No clubbing, cyanosis, or edema  NERVOUS SYSTEM:  A&Ox3    No events overnight, pt seen and examined, wants to go home, knows to f/u with pulmonary (Dr. Headley) for outpt sleep study and outpt asthma management, given iunstructions on prednisone taper, agreeable, stable for dc.
Electrophysiology    Pts device ___LINQ 2 Mdetronic ___ was interrogated on 02-06-25  Device working properly  Events: no events    Underlying rhythm sinus tach 115  Battery good   R-wave 0.26mv      Contact EP ACP with any questions [id number]
Aaron reviewed by neurology team. No evidence of seizures or epileptiform activity. 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 No further neurological workup needed at this time. Recall as needed.     Discussed with Dr. Bejarano

## 2025-02-10 NOTE — DISCHARGE NOTE NURSING/CASE MANAGEMENT/SOCIAL WORK - PATIENT PORTAL LINK FT
You can access the FollowMyHealth Patient Portal offered by [facility] by registering at the following website: [email or web address]. By joining [facility] [employer] [facility]’s FollowMyHealth portal, you will also be able to view your health information using other applications (apps) compatible with our system.

## 2025-02-10 NOTE — DISCHARGE NOTE NURSING/CASE MANAGEMENT/SOCIAL WORK - FINANCIAL ASSISTANCE
[facility] provides services at a reduced cost to those who are determined to be eligible through [facility]’s financial assistance program. Information regarding [facility]’s financial assistance program can be found by going to [email or web address] or by calling [phone].

## 2025-02-12 ENCOUNTER — OUTPATIENT (OUTPATIENT)
Dept: OUTPATIENT SERVICES | Facility: HOSPITAL | Age: 40
LOS: 1 days | End: 2025-02-12
Payer: MEDICAID

## 2025-02-12 ENCOUNTER — NON-APPOINTMENT (OUTPATIENT)
Age: 40
End: 2025-02-12

## 2025-02-12 ENCOUNTER — APPOINTMENT (OUTPATIENT)
Dept: PSYCHIATRY | Facility: CLINIC | Age: 40
End: 2025-02-12
Payer: MEDICAID

## 2025-02-12 DIAGNOSIS — F32.A DEPRESSION, UNSPECIFIED: ICD-10-CM

## 2025-02-12 DIAGNOSIS — F41.0 PANIC DISORDER [EPISODIC PAROXYSMAL ANXIETY]: ICD-10-CM

## 2025-02-12 DIAGNOSIS — F41.1 GENERALIZED ANXIETY DISORDER: ICD-10-CM

## 2025-02-12 DIAGNOSIS — F41.0 GENERALIZED ANXIETY DISORDER: ICD-10-CM

## 2025-02-12 DIAGNOSIS — F41.9 ANXIETY DISORDER, UNSPECIFIED: ICD-10-CM

## 2025-02-12 DIAGNOSIS — F60.3 BORDERLINE PERSONALITY DISORDER: ICD-10-CM

## 2025-02-12 PROCEDURE — 99214 OFFICE O/P EST MOD 30 MIN: CPT | Mod: 95

## 2025-02-12 PROCEDURE — 98007 SYNCH AUDIO-VIDEO EST HI 40: CPT

## 2025-02-13 DIAGNOSIS — F41.9 ANXIETY DISORDER, UNSPECIFIED: ICD-10-CM

## 2025-02-13 DIAGNOSIS — F32.A DEPRESSION, UNSPECIFIED: ICD-10-CM

## 2025-02-13 DIAGNOSIS — F41.0 PANIC DISORDER [EPISODIC PAROXYSMAL ANXIETY]: ICD-10-CM

## 2025-02-13 DIAGNOSIS — F60.3 BORDERLINE PERSONALITY DISORDER: ICD-10-CM

## 2025-02-13 LAB — S PNEUM AG UR QL: NEGATIVE — SIGNIFICANT CHANGE UP

## 2025-02-14 DIAGNOSIS — J45.901 UNSPECIFIED ASTHMA WITH (ACUTE) EXACERBATION: ICD-10-CM

## 2025-02-14 DIAGNOSIS — E10.65 TYPE 1 DIABETES MELLITUS WITH HYPERGLYCEMIA: ICD-10-CM

## 2025-02-14 DIAGNOSIS — Z87.440 PERSONAL HISTORY OF URINARY (TRACT) INFECTIONS: ICD-10-CM

## 2025-02-14 DIAGNOSIS — Z11.52 ENCOUNTER FOR SCREENING FOR COVID-19: ICD-10-CM

## 2025-02-14 DIAGNOSIS — M51.16 INTERVERTEBRAL DISC DISORDERS WITH RADICULOPATHY, LUMBAR REGION: ICD-10-CM

## 2025-02-14 DIAGNOSIS — Z95.818 PRESENCE OF OTHER CARDIAC IMPLANTS AND GRAFTS: ICD-10-CM

## 2025-02-14 DIAGNOSIS — J96.01 ACUTE RESPIRATORY FAILURE WITH HYPOXIA: ICD-10-CM

## 2025-02-14 DIAGNOSIS — K21.9 GASTRO-ESOPHAGEAL REFLUX DISEASE WITHOUT ESOPHAGITIS: ICD-10-CM

## 2025-02-14 DIAGNOSIS — Z96.41 PRESENCE OF INSULIN PUMP (EXTERNAL) (INTERNAL): ICD-10-CM

## 2025-02-14 DIAGNOSIS — E10.41 TYPE 1 DIABETES MELLITUS WITH DIABETIC MONONEUROPATHY: ICD-10-CM

## 2025-02-14 DIAGNOSIS — I10 ESSENTIAL (PRIMARY) HYPERTENSION: ICD-10-CM

## 2025-02-14 DIAGNOSIS — Z88.0 ALLERGY STATUS TO PENICILLIN: ICD-10-CM

## 2025-02-14 DIAGNOSIS — T38.0X5A ADVERSE EFFECT OF GLUCOCORTICOIDS AND SYNTHETIC ANALOGUES, INITIAL ENCOUNTER: ICD-10-CM

## 2025-02-14 DIAGNOSIS — R94.31 ABNORMAL ELECTROCARDIOGRAM [ECG] [EKG]: ICD-10-CM

## 2025-02-14 DIAGNOSIS — Z79.82 LONG TERM (CURRENT) USE OF ASPIRIN: ICD-10-CM

## 2025-02-14 DIAGNOSIS — Z56.0 UNEMPLOYMENT, UNSPECIFIED: ICD-10-CM

## 2025-02-14 DIAGNOSIS — F32.9 MAJOR DEPRESSIVE DISORDER, SINGLE EPISODE, UNSPECIFIED: ICD-10-CM

## 2025-02-14 DIAGNOSIS — Z88.1 ALLERGY STATUS TO OTHER ANTIBIOTIC AGENTS: ICD-10-CM

## 2025-02-14 SDOH — ECONOMIC STABILITY - INCOME SECURITY: UNEMPLOYMENT, UNSPECIFIED: Z56.0

## 2025-02-18 DIAGNOSIS — R74.8 ABNORMAL LEVELS OF OTHER SERUM ENZYMES: ICD-10-CM

## 2025-02-18 DIAGNOSIS — E55.9 VITAMIN D DEFICIENCY, UNSPECIFIED: ICD-10-CM

## 2025-02-18 DIAGNOSIS — R06.2 WHEEZING: ICD-10-CM

## 2025-02-18 DIAGNOSIS — R55 SYNCOPE AND COLLAPSE: ICD-10-CM

## 2025-02-18 DIAGNOSIS — I10 ESSENTIAL (PRIMARY) HYPERTENSION: ICD-10-CM

## 2025-02-18 DIAGNOSIS — J45.909 UNSPECIFIED ASTHMA, UNCOMPLICATED: ICD-10-CM

## 2025-02-18 DIAGNOSIS — D64.9 ANEMIA, UNSPECIFIED: ICD-10-CM

## 2025-02-18 DIAGNOSIS — G56.03 CARPAL TUNNEL SYNDROME, BILATERAL UPPER LIMBS: ICD-10-CM

## 2025-02-18 DIAGNOSIS — E11.9 TYPE 2 DIABETES MELLITUS WITHOUT COMPLICATIONS: ICD-10-CM

## 2025-02-19 ENCOUNTER — NON-APPOINTMENT (OUTPATIENT)
Age: 40
End: 2025-02-19

## 2025-02-19 ENCOUNTER — APPOINTMENT (OUTPATIENT)
Dept: PSYCHIATRY | Facility: CLINIC | Age: 40
End: 2025-02-19

## 2025-02-20 ENCOUNTER — APPOINTMENT (OUTPATIENT)
Dept: ELECTROPHYSIOLOGY | Facility: CLINIC | Age: 40
End: 2025-02-20

## 2025-02-20 ENCOUNTER — NON-APPOINTMENT (OUTPATIENT)
Age: 40
End: 2025-02-20

## 2025-02-20 VITALS
HEIGHT: 64 IN | SYSTOLIC BLOOD PRESSURE: 135 MMHG | BODY MASS INDEX: 32.44 KG/M2 | WEIGHT: 190 LBS | HEART RATE: 76 BPM | DIASTOLIC BLOOD PRESSURE: 70 MMHG

## 2025-02-20 PROCEDURE — 93291 INTERROG DEV EVAL SCRMS IP: CPT

## 2025-02-20 PROCEDURE — 99214 OFFICE O/P EST MOD 30 MIN: CPT

## 2025-02-20 RX ORDER — ASPIRIN 81 MG/1
81 TABLET ORAL DAILY
Refills: 0 | Status: ACTIVE | COMMUNITY

## 2025-02-20 RX ORDER — ATORVASTATIN CALCIUM 40 MG/1
40 TABLET, FILM COATED ORAL AT BEDTIME
Refills: 0 | Status: ACTIVE | COMMUNITY

## 2025-02-26 ENCOUNTER — NON-APPOINTMENT (OUTPATIENT)
Age: 40
End: 2025-02-26

## 2025-03-03 ENCOUNTER — LABORATORY RESULT (OUTPATIENT)
Age: 40
End: 2025-03-03

## 2025-03-03 ENCOUNTER — APPOINTMENT (OUTPATIENT)
Dept: PULMONOLOGY | Facility: CLINIC | Age: 40
End: 2025-03-03
Payer: MEDICAID

## 2025-03-03 VITALS
RESPIRATION RATE: 14 BRPM | HEART RATE: 78 BPM | HEIGHT: 64 IN | SYSTOLIC BLOOD PRESSURE: 124 MMHG | WEIGHT: 186 LBS | DIASTOLIC BLOOD PRESSURE: 62 MMHG | BODY MASS INDEX: 31.76 KG/M2 | OXYGEN SATURATION: 98 %

## 2025-03-03 DIAGNOSIS — G47.33 OBSTRUCTIVE SLEEP APNEA (ADULT) (PEDIATRIC): ICD-10-CM

## 2025-03-03 DIAGNOSIS — J45.909 UNSPECIFIED ASTHMA, UNCOMPLICATED: ICD-10-CM

## 2025-03-03 LAB
BASOPHILS # BLD AUTO: 0.04 K/UL
BASOPHILS NFR BLD AUTO: 0.4 %
EOSINOPHIL # BLD AUTO: 0.33 K/UL
EOSINOPHIL NFR BLD AUTO: 3.2 %
HCT VFR BLD CALC: 38.6 %
HGB BLD-MCNC: 12.3 G/DL
IMM GRANULOCYTES NFR BLD AUTO: 0.5 %
LYMPHOCYTES # BLD AUTO: 2.5 K/UL
LYMPHOCYTES NFR BLD AUTO: 24.3 %
MAN DIFF?: NORMAL
MCHC RBC-ENTMCNC: 26.2 PG
MCHC RBC-ENTMCNC: 31.9 G/DL
MCV RBC AUTO: 82.3 FL
MONOCYTES # BLD AUTO: 0.84 K/UL
MONOCYTES NFR BLD AUTO: 8.2 %
NEUTROPHILS # BLD AUTO: 6.54 K/UL
NEUTROPHILS NFR BLD AUTO: 63.4 %
PLATELET # BLD AUTO: 443 K/UL
PMV BLD AUTO: 0 /100 WBCS
PMV BLD: 10.4 FL
RBC # BLD: 4.69 M/UL
RBC # FLD: 21.4 %
WBC # FLD AUTO: 10.3 K/UL

## 2025-03-03 PROCEDURE — G2211 COMPLEX E/M VISIT ADD ON: CPT | Mod: NC

## 2025-03-03 PROCEDURE — 99214 OFFICE O/P EST MOD 30 MIN: CPT

## 2025-03-03 RX ORDER — BUDESONIDE AND FORMOTEROL FUMARATE DIHYDRATE 160; 4.5 UG/1; UG/1
160-4.5 AEROSOL RESPIRATORY (INHALATION) TWICE DAILY
Qty: 1 | Refills: 3 | Status: ACTIVE | COMMUNITY
Start: 2025-03-03 | End: 1900-01-01

## 2025-03-05 LAB — TOTAL IGE SMQN RAST: 14 KU/L

## 2025-03-06 PROBLEM — Z48.89 ENCOUNTER FOR POSTOPERATIVE WOUND CHECK: Status: ACTIVE | Noted: 2025-03-06

## 2025-03-06 PROBLEM — R94.31 LONG QT INTERVAL: Status: ACTIVE | Noted: 2025-03-06

## 2025-03-06 PROBLEM — Z45.09 ENCOUNTER FOR LOOP RECORDER CHECK: Status: ACTIVE | Noted: 2025-03-06

## 2025-03-07 ENCOUNTER — APPOINTMENT (OUTPATIENT)
Dept: PSYCHIATRY | Facility: CLINIC | Age: 40
End: 2025-03-07

## 2025-03-10 LAB
A ALTERNATA IGE QN: <0.1 KUA/L
A FUMIGATUS IGE QN: <0.1 KUA/L
C ALBICANS IGE QN: 0.17 KUA/L
C HERBARUM IGE QN: <0.1 KUA/L
CAT DANDER IGE QN: <0.1 KUA/L
COMMON RAGWEED IGE QN: <0.1 KUA/L
D FARINAE IGE QN: 0.71 KUA/L
D PTERONYSS IGE QN: 0.29 KUA/L
DEPRECATED A ALTERNATA IGE RAST QL: 0
DEPRECATED A FUMIGATUS IGE RAST QL: 0
DEPRECATED C ALBICANS IGE RAST QL: NORMAL
DEPRECATED C HERBARUM IGE RAST QL: 0
DEPRECATED CAT DANDER IGE RAST QL: 0
DEPRECATED COMMON RAGWEED IGE RAST QL: 0
DEPRECATED D FARINAE IGE RAST QL: 2
DEPRECATED D PTERONYSS IGE RAST QL: NORMAL
DEPRECATED DOG DANDER IGE RAST QL: 0
DEPRECATED M RACEMOSUS IGE RAST QL: 0
DEPRECATED ROACH IGE RAST QL: 0
DEPRECATED TIMOTHY IGE RAST QL: 0
DEPRECATED WHITE OAK IGE RAST QL: 0
DOG DANDER IGE QN: <0.1 KUA/L
M RACEMOSUS IGE QN: <0.1 KUA/L
ROACH IGE QN: <0.1 KUA/L
TIMOTHY IGE QN: <0.1 KUA/L
WHITE OAK IGE QN: <0.1 KUA/L

## 2025-03-13 ENCOUNTER — APPOINTMENT (OUTPATIENT)
Dept: PSYCHIATRY | Facility: CLINIC | Age: 40
End: 2025-03-13

## 2025-03-19 ENCOUNTER — APPOINTMENT (OUTPATIENT)
Dept: OPHTHALMOLOGY | Facility: CLINIC | Age: 40
End: 2025-03-19

## 2025-03-20 ENCOUNTER — NON-APPOINTMENT (OUTPATIENT)
Age: 40
End: 2025-03-20

## 2025-03-23 NOTE — ED ADULT TRIAGE NOTE - MEANS OF ARRIVAL
Patient with known CAD s/p stent placement, which is controlled Will continue ASA and Statin and monitor for S/Sx of angina/ACS. Continue to monitor on telemetry.    stretcher

## 2025-03-24 ENCOUNTER — APPOINTMENT (OUTPATIENT)
Dept: NEUROLOGY | Facility: CLINIC | Age: 40
End: 2025-03-24

## 2025-03-24 ENCOUNTER — APPOINTMENT (OUTPATIENT)
Dept: CARDIOLOGY | Facility: CLINIC | Age: 40
End: 2025-03-24
Payer: MEDICAID

## 2025-03-24 ENCOUNTER — NON-APPOINTMENT (OUTPATIENT)
Age: 40
End: 2025-03-24

## 2025-03-24 PROCEDURE — 93298 REM INTERROG DEV EVAL SCRMS: CPT

## 2025-03-26 ENCOUNTER — EMERGENCY (EMERGENCY)
Facility: HOSPITAL | Age: 40
LOS: 0 days | Discharge: HOME | End: 2025-03-26
Attending: EMERGENCY MEDICINE
Payer: MEDICAID

## 2025-03-26 VITALS
OXYGEN SATURATION: 98 % | TEMPERATURE: 98 F | SYSTOLIC BLOOD PRESSURE: 106 MMHG | HEART RATE: 96 BPM | RESPIRATION RATE: 18 BRPM | DIASTOLIC BLOOD PRESSURE: 68 MMHG | HEIGHT: 64 IN | WEIGHT: 147.05 LBS

## 2025-03-26 DIAGNOSIS — Z88.1 ALLERGY STATUS TO OTHER ANTIBIOTIC AGENTS: ICD-10-CM

## 2025-03-26 DIAGNOSIS — R05.8 OTHER SPECIFIED COUGH: ICD-10-CM

## 2025-03-26 DIAGNOSIS — F32.A DEPRESSION, UNSPECIFIED: ICD-10-CM

## 2025-03-26 DIAGNOSIS — Z88.0 ALLERGY STATUS TO PENICILLIN: ICD-10-CM

## 2025-03-26 DIAGNOSIS — E11.9 TYPE 2 DIABETES MELLITUS WITHOUT COMPLICATIONS: ICD-10-CM

## 2025-03-26 DIAGNOSIS — Z88.7 ALLERGY STATUS TO SERUM AND VACCINE: ICD-10-CM

## 2025-03-26 DIAGNOSIS — I10 ESSENTIAL (PRIMARY) HYPERTENSION: ICD-10-CM

## 2025-03-26 DIAGNOSIS — K21.9 GASTRO-ESOPHAGEAL REFLUX DISEASE WITHOUT ESOPHAGITIS: ICD-10-CM
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
## 2025-03-26 DIAGNOSIS — S29.012A STRAIN OF MUSCLE AND TENDON OF BACK WALL OF THORAX, INITIAL ENCOUNTER: ICD-10-CM
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
## 2025-03-26 DIAGNOSIS — M54.2 CERVICALGIA: ICD-10-CM

## 2025-03-26 PROCEDURE — 94640 AIRWAY INHALATION TREATMENT: CPT

## 2025-03-26 PROCEDURE — 71046 X-RAY EXAM CHEST 2 VIEWS: CPT | Mod: 26

## 2025-03-26 PROCEDURE — 71046 X-RAY EXAM CHEST 2 VIEWS: CPT

## 2025-03-26 PROCEDURE — 96372 THER/PROPH/DIAG INJ SC/IM: CPT

## 2025-03-26 PROCEDURE — 93010 ELECTROCARDIOGRAM REPORT: CPT

## 2025-03-26 PROCEDURE — 99284 EMERGENCY DEPT VISIT MOD MDM: CPT

## 2025-03-26 PROCEDURE — 93005 ELECTROCARDIOGRAM TRACING: CPT

## 2025-03-26 PROCEDURE — 99283 EMERGENCY DEPT VISIT LOW MDM: CPT | Mod: 25

## 2025-03-26 RX ORDER — METHOCARBAMOL 500 MG/1
1 TABLET, FILM COATED ORAL
Qty: 15 | Refills: 0
Start: 2025-03-26 | End: 2025-03-30

## 2025-03-26 RX ORDER — KETOROLAC TROMETHAMINE 30 MG/ML
15 INJECTION, SOLUTION INTRAMUSCULAR; INTRAVENOUS ONCE
Refills: 0 | Status: DISCONTINUED | OUTPATIENT
Start: 2025-03-26 | End: 2025-03-26

## 2025-03-26 RX ORDER — ALBUTEROL SULFATE 2.5 MG/3ML
1 VIAL, NEBULIZER (ML) INHALATION
Qty: 1 | Refills: 0
Start: 2025-03-26 | End: 2025-04-24

## 2025-03-26 RX ORDER — ALBUTEROL SULFATE 2.5 MG/3ML
2.5 VIAL, NEBULIZER (ML) INHALATION ONCE
Refills: 0 | Status: COMPLETED | OUTPATIENT
Start: 2025-03-26 | End: 2025-03-26

## 2025-03-26 RX ORDER — METHOCARBAMOL 500 MG/1
750 TABLET, FILM COATED ORAL ONCE
Refills: 0 | Status: COMPLETED | OUTPATIENT
Start: 2025-03-26 | End: 2025-03-26

## 2025-03-26 RX ADMIN — METHOCARBAMOL 750 MILLIGRAM(S): 500 TABLET, FILM COATED ORAL at 23:24

## 2025-03-26 RX ADMIN — KETOROLAC TROMETHAMINE 15 MILLIGRAM(S): 30 INJECTION, SOLUTION INTRAMUSCULAR; INTRAVENOUS at 22:17

## 2025-03-26 RX ADMIN — Medication 2.5 MILLIGRAM(S): at 22:17

## 2025-03-26 NOTE — ED PROVIDER NOTE - NSFOLLOWUPINSTRUCTIONS_ED_ALL_ED_FT
Rest and drink plenty of fluids  Take the robaxin (muscle relaxer) every 8 hours as needed for pain  Take ibuprofen in combination with the robaxin as needed for pain  Use the albuterol inhaler to help with your cough  Follow up with your PCP and orthopedics  For any worsening or concerning symptoms please return to the ED for re-evaluation and treatment      Our Emergency Department Referral Coordinators will be reaching out to you in the next 24-48 hours from 9:00am to 5:00pm to schedule a follow up appointment. Please expect a phone call from the hospital in that time frame. If you do not receive a call or if you have any questions or concerns, you can reach them at   [phone].        Back Pain    Back pain is very common in adults. The cause of back pain is rarely dangerous and the pain often gets better over time. The cause of your back pain may not be known and may include strain of muscles or ligaments, degeneration of the spinal disks, or arthritis. Occasionally the pain may radiate down your leg(s). Over-the-counter medicines to reduce pain and inflammation are often the most helpful. Stretching and remaining active frequently helps the healing process.     SEEK IMMEDIATE MEDICAL CARE IF YOU HAVE ANY OF THE FOLLOWING SYMPTOMS: bowel or bladder control problems, unusual weakness or numbness in your arms or legs, nausea or vomiting, abdominal pain, fever, dizziness/lightheadedness.

## 2025-03-26 NOTE — ED PROVIDER NOTE - OBJECTIVE STATEMENT
38yo f c/o upper back and anterior shoulder pain worse with movement and better when resting x approx 1 week. Mother at bedside states pt has a habit of sleeping in weird twisted positions and she has been doing a lot of "luis art" where she is sitting hunched over a desk for extended periods of time. Pt denies palpitations, sob, nausea, vomiting, abd pain, dizziness. Pt took tylenol/ibuprofen with minimal relief. Admits to starting to develop a dry cough today, no fever

## 2025-03-26 NOTE — ED PROVIDER NOTE - PHYSICAL EXAMINATION
CONST: NAD, WDWN  EYES: PERRLA, no photophobia, EOMI without pain, conjunctiva pink   ENT: Moist mucous membranes, no nasal discharge.   NECK: Supple, non-tender, no resistance  to flexion  CARD: Regular rate and rhythm  RESP: No resp distress, CTA b/l, no w/r/r  GI: (+) BS x 4, soft, non-tender, non-distended, no guarding/rebound  MS: FROM x4. No CTL spine tenderness, (+) muscle tension to upper back worse with certain movements. B/l shoulders without deformity, nontender, FROM without pain  SKIN: Warm, dry, no acute rashes. Good turgor  NEURO: A&Ox3, No focal deficits. Strength 5/5 with no sensory deficits

## 2025-03-26 NOTE — ED PROVIDER NOTE - NSFOLLOWUPCLINICS_GEN_ALL_ED_FT
[facility]  Orthpedic  [address], [place]   Phone: [phone]  Fax:

## 2025-03-26 NOTE — ED PROVIDER NOTE - CLINICAL SUMMARY MEDICAL DECISION MAKING FREE TEXT BOX
38 yo F, hx of HTN, DM II, anxiety, depression, GERD, intermittent/chronic back and neck pain, non convulsive seizures here for assessment of chest and back pain -- pain worse with movement, deep breath, worse after leaning over doing arts and crafts. No palpitations, dyspnea, diaphoresis, dizziness. Has had recent, extensive cardiac work up.    VS normal, well appearing on exam, has clear lungs, RRR, soft, NT, ND abdomen, no LE edema, no midline CTL spine ttp or step off, non focal neuro exam.     Today developed mild, dry cough, no fever, chills.     EKG without acute ischemic changes. CXR read as possible LLL infiltrate however has no fever cough is non productive and CXR is not significantly changed from previous when patient was treated for mycoplasma.    Suspect MSK pain -- received nsaids and robaxin, pain improved.    Advised on rest, stretching, sx monitoring, return precautions, follow up.

## 2025-03-26 NOTE — ED PROVIDER NOTE - PATIENT PORTAL LINK FT
You can access the FollowMyHealth Patient Portal offered by [facility] by registering at the following website: [email or web address]. By joining [employer]’s FollowMyHealth portal, you will also be able to view your health information using other applications (apps) compatible with our system.

## 2025-03-27 ENCOUNTER — APPOINTMENT (OUTPATIENT)
Dept: PSYCHIATRY | Facility: CLINIC | Age: 40
End: 2025-03-27

## 2025-03-31 ENCOUNTER — APPOINTMENT (OUTPATIENT)
Dept: PSYCHIATRY | Facility: CLINIC | Age: 40
End: 2025-03-31
Payer: MEDICAID

## 2025-03-31 ENCOUNTER — OUTPATIENT (OUTPATIENT)
Dept: OUTPATIENT SERVICES | Facility: HOSPITAL | Age: 40
LOS: 1 days | End: 2025-03-31
Payer: MEDICAID

## 2025-03-31 DIAGNOSIS — F41.0 GENERALIZED ANXIETY DISORDER: ICD-10-CM

## 2025-03-31 DIAGNOSIS — F41.9 ANXIETY DISORDER, UNSPECIFIED: ICD-10-CM

## 2025-03-31 DIAGNOSIS — F41.1 GENERALIZED ANXIETY DISORDER: ICD-10-CM

## 2025-03-31 DIAGNOSIS — F32.A DEPRESSION, UNSPECIFIED: ICD-10-CM

## 2025-03-31 PROCEDURE — 99214 OFFICE O/P EST MOD 30 MIN: CPT | Mod: 95

## 2025-03-31 PROCEDURE — 99214 OFFICE O/P EST MOD 30 MIN: CPT

## 2025-04-01 DIAGNOSIS — F32.A DEPRESSION, UNSPECIFIED: ICD-10-CM

## 2025-04-01 DIAGNOSIS — F41.1 GENERALIZED ANXIETY DISORDER: ICD-10-CM

## 2025-04-01 DIAGNOSIS — F41.9 ANXIETY DISORDER, UNSPECIFIED: ICD-10-CM

## 2025-04-03 ENCOUNTER — NON-APPOINTMENT (OUTPATIENT)
Age: 40
End: 2025-04-03

## 2025-04-03 NOTE — ED ADULT TRIAGE NOTE - CHIEF COMPLAINT QUOTE
I'm sorry- I don't see a recent echo and I would like him to get one. The order is in- can you call him to schedule?    Eber  
KRISTIN from [facility] for suicidal ideation

## 2025-04-06 ENCOUNTER — EMERGENCY (EMERGENCY)
Facility: HOSPITAL | Age: 40
LOS: 0 days | Discharge: HOME | End: 2025-04-07
Attending: EMERGENCY MEDICINE
Payer: MEDICAID

## 2025-04-06 VITALS
RESPIRATION RATE: 18 BRPM | OXYGEN SATURATION: 98 % | HEART RATE: 84 BPM | TEMPERATURE: 98 F | SYSTOLIC BLOOD PRESSURE: 114 MMHG | DIASTOLIC BLOOD PRESSURE: 70 MMHG

## 2025-04-06 VITALS
OXYGEN SATURATION: 98 % | DIASTOLIC BLOOD PRESSURE: 80 MMHG | SYSTOLIC BLOOD PRESSURE: 139 MMHG | HEART RATE: 76 BPM | TEMPERATURE: 98 F | HEIGHT: 64 IN | RESPIRATION RATE: 17 BRPM

## 2025-04-06 DIAGNOSIS — J45.909 UNSPECIFIED ASTHMA, UNCOMPLICATED: ICD-10-CM

## 2025-04-06 DIAGNOSIS — F32.A DEPRESSION, UNSPECIFIED: ICD-10-CM

## 2025-04-06 DIAGNOSIS — R07.89 OTHER CHEST PAIN: ICD-10-CM

## 2025-04-06 DIAGNOSIS — M54.6 PAIN IN THORACIC SPINE: ICD-10-CM

## 2025-04-06 DIAGNOSIS — M54.40 LUMBAGO WITH SCIATICA, UNSPECIFIED SIDE: ICD-10-CM

## 2025-04-06 DIAGNOSIS — M25.511 PAIN IN RIGHT SHOULDER: ICD-10-CM

## 2025-04-06 DIAGNOSIS — Z88.0 ALLERGY STATUS TO PENICILLIN: ICD-10-CM

## 2025-04-06 DIAGNOSIS — K21.9 GASTRO-ESOPHAGEAL REFLUX DISEASE WITHOUT ESOPHAGITIS: ICD-10-CM

## 2025-04-06 DIAGNOSIS — Z79.4 LONG TERM (CURRENT) USE OF INSULIN: ICD-10-CM

## 2025-04-06 DIAGNOSIS — F41.9 ANXIETY DISORDER, UNSPECIFIED: ICD-10-CM

## 2025-04-06 DIAGNOSIS — M54.50 LOW BACK PAIN, UNSPECIFIED: ICD-10-CM

## 2025-04-06 DIAGNOSIS — I10 ESSENTIAL (PRIMARY) HYPERTENSION: ICD-10-CM

## 2025-04-06 DIAGNOSIS — Z88.8 ALLERGY STATUS TO OTHER DRUGS, MEDICAMENTS AND BIOLOGICAL SUBSTANCES: ICD-10-CM

## 2025-04-06 DIAGNOSIS — M51.369 OTHER INTERVERTEBRAL DISC DEGENERATION, LUMBAR REGION WITHOUT MENTION OF LUMBAR BACK PAIN OR LOWER EXTREMITY PAIN: ICD-10-CM

## 2025-04-06 DIAGNOSIS — E11.9 TYPE 2 DIABETES MELLITUS WITHOUT COMPLICATIONS: ICD-10-CM

## 2025-04-06 DIAGNOSIS — Z88.1 ALLERGY STATUS TO OTHER ANTIBIOTIC AGENTS: ICD-10-CM

## 2025-04-06 LAB
APPEARANCE UR: CLEAR — SIGNIFICANT CHANGE UP
BASOPHILS # BLD AUTO: 0.07 K/UL — SIGNIFICANT CHANGE UP (ref 0–0.2)
BASOPHILS NFR BLD AUTO: 0.6 % — SIGNIFICANT CHANGE UP (ref 0–1)
BILIRUB UR-MCNC: NEGATIVE — SIGNIFICANT CHANGE UP
COLOR SPEC: YELLOW — SIGNIFICANT CHANGE UP
DIFF PNL FLD: NEGATIVE — SIGNIFICANT CHANGE UP
EOSINOPHIL # BLD AUTO: 0.33 K/UL — SIGNIFICANT CHANGE UP (ref 0–0.7)
EOSINOPHIL NFR BLD AUTO: 2.7 % — SIGNIFICANT CHANGE UP (ref 0–8)
GLUCOSE UR QL: >=1000 MG/DL
HCT VFR BLD CALC: 38.1 % — SIGNIFICANT CHANGE UP (ref 37–47)
HGB BLD-MCNC: 12.8 G/DL — SIGNIFICANT CHANGE UP (ref 12–16)
IMM GRANULOCYTES NFR BLD AUTO: 0.7 % — HIGH (ref 0.1–0.3)
KETONES UR-MCNC: ABNORMAL MG/DL
LEUKOCYTE ESTERASE UR-ACNC: NEGATIVE — SIGNIFICANT CHANGE UP
LYMPHOCYTES # BLD AUTO: 2.58 K/UL — SIGNIFICANT CHANGE UP (ref 1.2–3.4)
LYMPHOCYTES # BLD AUTO: 21 % — SIGNIFICANT CHANGE UP (ref 20.5–51.1)
MCHC RBC-ENTMCNC: 28.4 PG — SIGNIFICANT CHANGE UP (ref 27–31)
MCHC RBC-ENTMCNC: 33.6 G/DL — SIGNIFICANT CHANGE UP (ref 32–37)
MCV RBC AUTO: 84.5 FL — SIGNIFICANT CHANGE UP (ref 81–99)
MONOCYTES # BLD AUTO: 0.72 K/UL — HIGH (ref 0.1–0.6)
MONOCYTES NFR BLD AUTO: 5.9 % — SIGNIFICANT CHANGE UP (ref 1.7–9.3)
NEUTROPHILS # BLD AUTO: 8.5 K/UL — HIGH (ref 1.4–6.5)
NEUTROPHILS NFR BLD AUTO: 69.1 % — SIGNIFICANT CHANGE UP (ref 42.2–75.2)
NITRITE UR-MCNC: NEGATIVE — SIGNIFICANT CHANGE UP
NRBC BLD AUTO-RTO: 0 /100 WBCS — SIGNIFICANT CHANGE UP (ref 0–0)
PH UR: 5.5 — SIGNIFICANT CHANGE UP (ref 5–8)
PLATELET # BLD AUTO: 347 K/UL — SIGNIFICANT CHANGE UP (ref 130–400)
PMV BLD: 10 FL — SIGNIFICANT CHANGE UP (ref 7.4–10.4)
PROT UR-MCNC: NEGATIVE MG/DL — SIGNIFICANT CHANGE UP
RBC # BLD: 4.51 M/UL — SIGNIFICANT CHANGE UP (ref 4.2–5.4)
RBC # FLD: 16.5 % — HIGH (ref 11.5–14.5)
SP GR SPEC: >1.03 — HIGH (ref 1–1.03)
UROBILINOGEN FLD QL: 0.2 MG/DL — SIGNIFICANT CHANGE UP (ref 0.2–1)
WBC # BLD: 12.28 K/UL — HIGH (ref 4.8–10.8)
WBC # FLD AUTO: 12.28 K/UL — HIGH (ref 4.8–10.8)

## 2025-04-06 PROCEDURE — 74019 RADEX ABDOMEN 2 VIEWS: CPT | Mod: 26

## 2025-04-06 PROCEDURE — 85025 COMPLETE CBC W/AUTO DIFF WBC: CPT

## 2025-04-06 PROCEDURE — 99284 EMERGENCY DEPT VISIT MOD MDM: CPT

## 2025-04-06 PROCEDURE — 82962 GLUCOSE BLOOD TEST: CPT

## 2025-04-06 PROCEDURE — 80053 COMPREHEN METABOLIC PANEL: CPT

## 2025-04-06 PROCEDURE — 81003 URINALYSIS AUTO W/O SCOPE: CPT

## 2025-04-06 PROCEDURE — 99283 EMERGENCY DEPT VISIT LOW MDM: CPT | Mod: 25

## 2025-04-06 PROCEDURE — 82010 KETONE BODYS QUAN: CPT

## 2025-04-06 PROCEDURE — 74019 RADEX ABDOMEN 2 VIEWS: CPT

## 2025-04-06 PROCEDURE — 36415 COLL VENOUS BLD VENIPUNCTURE: CPT

## 2025-04-06 PROCEDURE — 96372 THER/PROPH/DIAG INJ SC/IM: CPT

## 2025-04-06 RX ORDER — KETOROLAC TROMETHAMINE 30 MG/ML
30 INJECTION, SOLUTION INTRAMUSCULAR; INTRAVENOUS ONCE
Refills: 0 | Status: DISCONTINUED | OUTPATIENT
Start: 2025-04-06 | End: 2025-04-06

## 2025-04-06 RX ORDER — ACETAMINOPHEN 500 MG/5ML
650 LIQUID (ML) ORAL ONCE
Refills: 0 | Status: COMPLETED | OUTPATIENT
Start: 2025-04-06 | End: 2025-04-06

## 2025-04-06 RX ORDER — METHOCARBAMOL 500 MG/1
1500 TABLET, FILM COATED ORAL ONCE
Refills: 0 | Status: COMPLETED | OUTPATIENT
Start: 2025-04-06 | End: 2025-04-06

## 2025-04-06 RX ORDER — LIDOCAINE HYDROCHLORIDE 20 MG/ML
1 JELLY TOPICAL ONCE
Refills: 0 | Status: COMPLETED | OUTPATIENT
Start: 2025-04-06 | End: 2025-04-06

## 2025-04-06 RX ADMIN — Medication 650 MILLIGRAM(S): at 22:21

## 2025-04-06 RX ADMIN — LIDOCAINE HYDROCHLORIDE 1 PATCH: 20 JELLY TOPICAL at 22:27

## 2025-04-06 RX ADMIN — METHOCARBAMOL 1500 MILLIGRAM(S): 500 TABLET, FILM COATED ORAL at 22:21

## 2025-04-06 RX ADMIN — KETOROLAC TROMETHAMINE 30 MILLIGRAM(S): 30 INJECTION, SOLUTION INTRAMUSCULAR; INTRAVENOUS at 22:21

## 2025-04-06 NOTE — ED PROVIDER NOTE - NSFOLLOWUPINSTRUCTIONS_ED_ALL_ED_FT
Our Emergency Department Referral Coordinators will be reaching out to you in the next 24-48 hours from 9:00am to 5:00pm to schedule a follow up appointment. Please expect a phone call from the hospital in that time frame. If you do not receive a call or if you have any questions or concerns, you can reach them at   [phone]    Schedule follow up with Neurology, Pain management, and physical therapy    Neuropathic Pain  Neuropathic pain is pain caused by damage to the nerves that are responsible for certain sensations in your body (sensory nerves).    Neuropathic pain can make you more sensitive to pain. Even a minor sensation can feel very painful. This is usually a long-term (chronic) condition that can be difficult to treat. The type of pain differs from person to person. It may:  Start suddenly (acute), or it may develop slowly and become chronic.  Come and go as damaged nerves heal, or it may stay at the same level for years.  Cause emotional distress, loss of sleep, and a lower quality of life.  What are the causes?  The most common cause of this condition is diabetes. Many other diseases and conditions can also cause neuropathic pain. Causes of neuropathic pain can be classified as:  Toxic. This is caused by medicines and chemicals. The most common causes of toxic neuropathic pain is damage from medicines that kill cancer cells (chemotherapy) or alcohol abuse.  Metabolic. This can be caused by:  Diabetes.  Lack of vitamins like B12.  Traumatic. Any injury that cuts, crushes, or stretches a nerve can cause damage and pain.  Compression-related. If a sensory nerve gets trapped or compressed for a long period of time, the blood supply to the nerve can be cut off.  Vascular. Many blood vessel diseases can cause neuropathic pain by decreasing blood supply and oxygen to nerves.  Autoimmune. This type of pain results from diseases in which the body's defense system (immune system) mistakenly attacks sensory nerves. Examples of autoimmune diseases that can cause neuropathic pain include lupus and multiple sclerosis.  Infectious. Many types of viral infections can damage sensory nerves and cause pain. Shingles infection is a common cause of this type of pain.  Inherited. Neuropathic pain can be a symptom of many diseases that are passed down through families (genetic).  What increases the risk?  You are more likely to develop this condition if:  You have diabetes.  You smoke.  You drink too much alcohol.  You are taking certain medicines, including chemotherapy or medicines that treat immune system disorders.  What are the signs or symptoms?  The main symptom is pain. Neuropathic pain is often described as:  Burning.  Shock-like.  Stinging.  Hot or cold.  Itching.  How is this diagnosed?  No single test can diagnose neuropathic pain. It is diagnosed based on:  A physical exam and your symptoms. Your health care provider will ask you about your pain. You may be asked to use a pain scale to describe how bad your pain is.  Tests. These may be done to see if you have a cause and location of any nerve damage. They include:  Nerve conduction studies and electromyography to test how well nerve signals travel through your nerves and muscles (electrodiagnostic testing).  Skin biopsy to evaluate for small fiber neuropathy.  Imaging studies, such as:  X-rays.  CT scan.  MRI.  How is this treated?  Treatment for neuropathic pain may change over time. You may need to try different treatment options or a combination of treatments. Some options include:  Treating the underlying cause of the neuropathy, such as diabetes, kidney disease, or vitamin deficiencies.  Stopping medicines that can cause neuropathy, such as chemotherapy.  Medicine to relieve pain. Medicines may include:  Prescription or over-the-counter pain medicine.  Anti-seizure medicine.  Antidepressant medicines.  Pain-relieving patches or creams that are applied to painful areas of skin.  A medicine to numb the area (local anesthetic), which can be injected as a nerve block.  Transcutaneous nerve stimulation. This uses electrical currents to block painful nerve signals. The treatment is painless.  Alternative treatments, such as:  Acupuncture.  Meditation.  Massage.  Occupational or physical therapy.  Pain management programs.  Counseling.  Follow these instructions at home:  Medicines    A prescription pill bottle with an example of a pill.  Take over-the-counter and prescription medicines only as told by your health care provider.  Ask your health care provider if the medicine prescribed to you:  Requires you to avoid driving or using machinery.  Can cause constipation. You may need to take these actions to prevent or treat constipation:  Drink enough fluid to keep your urine pale yellow.  Take over-the-counter or prescription medicines.  Eat foods that are high in fiber, such as beans, whole grains, and fresh fruits and vegetables.  Limit foods that are high in fat and processed sugars, such as fried or sweet foods.  Lifestyle    Two people talking with a counselor.  Have a good support system at home.  Consider joining a chronic pain support group.  Do not use any products that contain nicotine or tobacco. These products include cigarettes, chewing tobacco, and vaping devices, such as e-cigarettes. If you need help quitting, ask your health care provider.  Do not drink alcohol.  General instructions    Learn as much as you can about your condition.  Work closely with all your health care providers to find the treatment plan that works best for you.  Ask your health care provider what activities are safe for you.  Keep all follow-up visits. This is important.  Contact a health care provider if:  Your pain treatments are not working.  You are having side effects from your medicines.  You are struggling with tiredness (fatigue), mood changes, depression, or anxiety.  Get help right away if:  You have thoughts of hurting yourself.  Get help right away if you feel like you may hurt yourself or others, or have thoughts about taking your own life. Go to your nearest emergency room or:  Call 911.  Call the National Suicide Prevention Lifeline at 1-801.654.7417 or 945. This is open 24 hours a day.  Text the Crisis Text Line at 507729.  Summary  Neuropathic pain is pain caused by damage to the nerves that are responsible for certain sensations in your body (sensory nerves).  Neuropathic pain may come and go as damaged nerves heal, or it may stay at the same level for years.  Neuropathic pain is usually a long-term condition that can be difficult to treat. Consider joining a chronic pain support group.  This information is not intended to replace advice given to you by your health care provider. Make sure you discuss any questions you have with your health care provider.

## 2025-04-06 NOTE — ED PROVIDER NOTE - NSPTACCESSSVCSAPPTDETAILS_ED_ALL_ED_FT
Neuro - nerve pain 2 weeks   pain mgmt - 2 week pain cant take gabapentin  PT - eval treat back pain with DDD L3-L4 and spinal stenosis thoracic kyphosis

## 2025-04-06 NOTE — ED PROVIDER NOTE - PATIENT PORTAL LINK FT
You can access the FollowMyHealth Patient Portal offered by [facility] by registering at the following website: [email or web address]. By joining [employer]’s FollowMyHealth portal, you will also be able to view your health information using other applications (apps) compatible with our system.

## 2025-04-06 NOTE — ED PROVIDER NOTE - OBJECTIVE STATEMENT
Patient is a 39-year-old female with past medical history of diabetes on insulin, right-sided sciatica, spinal stenosis, degenerative disc disease L3-L4, presenting today with generalized pain.  Patient states that she has pain from left buttocks to right shoulder and down arm, worse with flexion, worse with extension, consistent for 2 weeks, worse within the last 2 days.  Patient states that she has been working on art projects that have her flexing her back while working for long hours for the last 2 months, and for the last year she has had abnormal sleep position where her body is assisted between 2 beds at different heights.  Patient denies any chest pain, abdominal pain, LOC, palpitation, nausea, vomiting, diarrhea, dysuria.

## 2025-04-06 NOTE — ED PROVIDER NOTE - PHYSICAL EXAMINATION
VITAL SIGNS: I have reviewed nursing notes and confirm.  CONSTITUTIONAL: Well-developed; well-nourished; in no acute distress.  SKIN: Skin exam is warm and dry, no acute rash.  HEAD: Normocephalic; atraumatic.  EYES: conjunctiva and sclera clear.  CARD: S1, S2 normal; no murmurs, gallops, or rubs. Regular rate and rhythm.  RESP: Normal respiratory effort, no tachypnea or distress. Lungs CTAB, no wheezes, rales or rhonchi.  ABD: soft, NT/ND.  EXT: Normal ROM. No clubbing, cyanosis or edema. SLR + on R.   NEURO: Alert, oriented. Grossly unremarkable. No focal deficits.  PSYCH: Cooperative, appropriate.

## 2025-04-06 NOTE — ED PROVIDER NOTE - PATIENT'S PREFERRED PRONOUN
Her/She Patient presented with narrow complex tachycardia of 200bpm at [facility], no response to adenosine, responded to amiodarone drip, briefly in AF RVR as below, now in NSR on most recent EKG and telemetry on arrival.  - Continue telemetry  - Echocardiogram in AM to assess for structural heart disease  - Continue amiodarone drip which converted patient in [facility].   - Etiology includes weight loss drug abuse as below, levothyroxine in absence of hypothyroidism which may have induced thyrotoxicosis, dehydration from diuretic use.

## 2025-04-06 NOTE — ED PROVIDER NOTE - CLINICAL SUMMARY MEDICAL DECISION MAKING FREE TEXT BOX
38 yo woman w/ DM, anxiety, depression, asthma, GERD, HTN here w/ back and chest pain X 2 weeks. States pain is constant, sharp w/ increase w/ bending/twisting. states sometimes feels pain going into legs. no exertional symptoms  no n/v/d  no cough, fever  no sudden/max/severe onset  was seen ~ 2 weeks ago for same and treated w/ robaxin but has run out  unable to get appt w/ PCP  no acute change in symptoms but hasn't improved leading to visit today    wd/wn  + mild pain distress  nc/at  eomi  mmm  rrr s1s2 wnl  cta b/l  nt/nd + BS  + diffuse ttp b/l thoracic and lumbar paraspinal regions  no midline ttp  + anterior chest wall ttp diffusely  from x 4  pulses intact b/l  gait stable  aao x 3    No weakness/numbness  No trauma  No CA history  No immunocompromise or immunomodulator use  No fevers  No change in bowel/bladder function  No IVDA      38 yo woman w/ diffuse chest and back pain.no high-risk features. Also states FS have been elevated at home  Will check CXR (abnormality on prior), FS, UA for ketones, analgesics and reassess

## 2025-04-07 LAB
ALBUMIN SERPL ELPH-MCNC: 3.8 G/DL — SIGNIFICANT CHANGE UP (ref 3.5–5.2)
ALP SERPL-CCNC: 158 U/L — HIGH (ref 30–115)
ALT FLD-CCNC: 48 U/L — HIGH (ref 0–41)
ANION GAP SERPL CALC-SCNC: 11 MMOL/L — SIGNIFICANT CHANGE UP (ref 7–14)
AST SERPL-CCNC: 21 U/L — SIGNIFICANT CHANGE UP (ref 0–41)
B-OH-BUTYR SERPL-SCNC: <0.2 MMOL/L — SIGNIFICANT CHANGE UP
BILIRUB SERPL-MCNC: <0.2 MG/DL — SIGNIFICANT CHANGE UP (ref 0.2–1.2)
BUN SERPL-MCNC: 11 MG/DL — SIGNIFICANT CHANGE UP (ref 10–20)
CALCIUM SERPL-MCNC: 9.2 MG/DL — SIGNIFICANT CHANGE UP (ref 8.4–10.5)
CHLORIDE SERPL-SCNC: 99 MMOL/L — SIGNIFICANT CHANGE UP (ref 98–110)
CO2 SERPL-SCNC: 23 MMOL/L — SIGNIFICANT CHANGE UP (ref 17–32)
CREAT SERPL-MCNC: 0.6 MG/DL — LOW (ref 0.7–1.5)
EGFR: 117 ML/MIN/1.73M2 — SIGNIFICANT CHANGE UP
EGFR: 117 ML/MIN/1.73M2 — SIGNIFICANT CHANGE UP
GLUCOSE SERPL-MCNC: 318 MG/DL — HIGH (ref 70–99)
POTASSIUM SERPL-MCNC: 4 MMOL/L — SIGNIFICANT CHANGE UP (ref 3.5–5)
POTASSIUM SERPL-SCNC: 4 MMOL/L — SIGNIFICANT CHANGE UP (ref 3.5–5)
PROT SERPL-MCNC: 6.5 G/DL — SIGNIFICANT CHANGE UP (ref 6–8)
SODIUM SERPL-SCNC: 133 MMOL/L — LOW (ref 135–146)

## 2025-04-07 RX ORDER — IBUPROFEN 200 MG
1 TABLET ORAL
Qty: 30 | Refills: 0
Start: 2025-04-07 | End: 2025-04-16

## 2025-04-07 RX ORDER — ACETAMINOPHEN 500 MG/5ML
2 LIQUID (ML) ORAL
Qty: 60 | Refills: 0
Start: 2025-04-07 | End: 2025-04-16

## 2025-04-08 ENCOUNTER — OUTPATIENT (OUTPATIENT)
Dept: OUTPATIENT SERVICES | Facility: HOSPITAL | Age: 40
LOS: 1 days | Discharge: HOME | End: 2025-04-08
Payer: MEDICAID

## 2025-04-08 ENCOUNTER — APPOINTMENT (OUTPATIENT)
Dept: SLEEP CENTER | Facility: HOSPITAL | Age: 40
End: 2025-04-08
Payer: MEDICAID

## 2025-04-08 DIAGNOSIS — G47.33 OBSTRUCTIVE SLEEP APNEA (ADULT) (PEDIATRIC): ICD-10-CM

## 2025-04-08 PROCEDURE — 95810 POLYSOM 6/> YRS 4/> PARAM: CPT | Mod: 26

## 2025-04-08 PROCEDURE — 95810 POLYSOM 6/> YRS 4/> PARAM: CPT

## 2025-04-10 DIAGNOSIS — G47.33 OBSTRUCTIVE SLEEP APNEA (ADULT) (PEDIATRIC): ICD-10-CM

## 2025-04-14 NOTE — BH DISCHARGE NOTE NURSING/SOCIAL WORK/PSYCH REHAB - NSDCPETBCESMAN_GEN_ALL_CORE
be maintained consistent with HIPPA regulations.    Sleep facility staff will be contact the patient to schedule the HSAT  and setup date and time.    Prior to patient arrival, HSAT devices will be inspected and prepared for application, ensuring all equipment is properly cleaned, free of damage, and any private health information from previous patient is deleted.   HSAT setup:   Gather Embletta MPR unit and supplies; carrying case should include the following:   Embletta MPR pouch   Thoracic effort belt and Xactrace sensor with blue connector    Embletta MPR recorder   Abdominal effort belt and Xactrace sensor with yellow connector      3 EKG wires and EKG patches   Cannula    XPOD Cord and disposable oximeter   Paper Tape o Gather paperwork:   Pre-post questionnaire   Study diary/comment form o Open [employer] program   Connect HSAT unit with USB cable   Click on “patient information” and add demographics, click OK.    You will see a checkmark by the Patient Information on the Observation tab.   Click on “device control” and follow the Recording Wizard. ? Select “start” and “stop” time for recording   Click on “Ok” on the Ambulatory Recording Summary   There will be a check parth by the device control on the Observation tab   Disconnect the MPR from the PC   Insert two new AA batteries   The device is now ready for the patient to take home.   Patient Instruct:   A consent form should be completed prior to setup   Verbally and physically demonstrate placement of all sensors, and how to remove them when study is completed   Title: Adult Polysomnography  Page: 2 of 3    Review pre and post questionnaire with patient o Review sleep log with patient    Record lights out and lights on time    Record any time patient is out of bed i.e. restroom o Inform patient of after hours contact number should any questions arise during the study. # [phone]   All calls during HSAT testing will be documented and logged to  If you are a smoker, it is important for your health to stop smoking. Please be aware that second hand smoke is also harmful.

## 2025-04-15 NOTE — ED CDU PROVIDER DISPOSITION NOTE - INPATIENT RESIDENT/ACP NOTIFIED DATE
13-Jan-2022 18:05
Bed/Stretcher in lowest position, wheels locked, appropriate side rails in place/Call bell, personal items and telephone in reach/Instruct patient to call for assistance before getting out of bed/chair/stretcher/Non-slip footwear applied when patient is off stretcher/[place] to call system/Physically safe environment - no spills, clutter or unnecessary equipment/Purposeful proactive rounding/Room/bathroom lighting operational, light cord in reach

## 2025-04-16 ENCOUNTER — APPOINTMENT (OUTPATIENT)
Dept: PSYCHIATRY | Facility: CLINIC | Age: 40
End: 2025-04-16
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
## 2025-04-17 ENCOUNTER — APPOINTMENT (OUTPATIENT)
Dept: ELECTROPHYSIOLOGY | Facility: CLINIC | Age: 40
End: 2025-04-17
Payer: MEDICAID

## 2025-04-17 VITALS
DIASTOLIC BLOOD PRESSURE: 84 MMHG | SYSTOLIC BLOOD PRESSURE: 126 MMHG | HEART RATE: 76 BPM | BODY MASS INDEX: 33.46 KG/M2 | HEIGHT: 64 IN | WEIGHT: 196 LBS

## 2025-04-17 DIAGNOSIS — R94.31 ABNORMAL ELECTROCARDIOGRAM [ECG] [EKG]: ICD-10-CM

## 2025-04-17 DIAGNOSIS — R55 SYNCOPE AND COLLAPSE: ICD-10-CM

## 2025-04-17 PROCEDURE — G2211 COMPLEX E/M VISIT ADD ON: CPT | Mod: NC

## 2025-04-17 PROCEDURE — 93000 ELECTROCARDIOGRAM COMPLETE: CPT

## 2025-04-17 PROCEDURE — 99213 OFFICE O/P EST LOW 20 MIN: CPT

## 2025-04-17 RX ORDER — AZELASTINE HYDROCHLORIDE 137 UG/1
0.1 SPRAY, METERED NASAL
Refills: 0 | Status: ACTIVE | COMMUNITY

## 2025-04-17 NOTE — ED ADULT TRIAGE NOTE - PATIENT'S SEXUAL ORIENTATION
[facility]: [phone]    Refer to psychiatry for ADHD evaluation.    Return in three months or sooner if needed.       Heterosexual

## 2025-04-17 NOTE — BH CHART NOTE - NSEVENTNOTEFT_PSY_ALL_CORE
Received call that patient had a temp of 103F. BP, HR, O2 sat wnl. Pt has had a non-productive cough since admission. COVID screening on admission was negative. Placed patient on respiratory precautions, ordered CXR and respiratory panel, acetaminophen ordered for fever. Received call that patient had a temp of 103F. BP, HR, O2 sat wnl, BG in 400s. Pt has had a non-productive cough since admission. Staff report one episode of emesis since admission. COVID screening on admission was negative. On examination, pt endorses cough, chest pain, nausea and SOB. Decreased breath sounds in RLL. Placed patient on respiratory precautions, ordered CXR and respiratory panel, acetaminophen ordered for fever. Pt also received scheduled and sliding scale insulin for elevated BG.  Spontaneous, unlabored and symmetrical

## 2025-04-30 ENCOUNTER — APPOINTMENT (OUTPATIENT)
Dept: PULMONOLOGY | Facility: CLINIC | Age: 40
End: 2025-04-30
Payer: MEDICAID

## 2025-04-30 ENCOUNTER — APPOINTMENT (OUTPATIENT)
Dept: PULMONOLOGY | Facility: CLINIC | Age: 40
End: 2025-04-30

## 2025-04-30 VITALS
HEART RATE: 75 BPM | DIASTOLIC BLOOD PRESSURE: 62 MMHG | RESPIRATION RATE: 14 BRPM | SYSTOLIC BLOOD PRESSURE: 118 MMHG | BODY MASS INDEX: 33.82 KG/M2 | WEIGHT: 197 LBS | OXYGEN SATURATION: 98 %

## 2025-04-30 DIAGNOSIS — G47.33 OBSTRUCTIVE SLEEP APNEA (ADULT) (PEDIATRIC): ICD-10-CM

## 2025-04-30 DIAGNOSIS — T78.40XA ALLERGY, UNSPECIFIED, INITIAL ENCOUNTER: ICD-10-CM

## 2025-04-30 DIAGNOSIS — J45.909 UNSPECIFIED ASTHMA, UNCOMPLICATED: ICD-10-CM

## 2025-04-30 PROCEDURE — 99214 OFFICE O/P EST MOD 30 MIN: CPT | Mod: 25

## 2025-04-30 PROCEDURE — 94727 GAS DIL/WSHOT DETER LNG VOL: CPT

## 2025-04-30 PROCEDURE — 94729 DIFFUSING CAPACITY: CPT

## 2025-04-30 PROCEDURE — 94010 BREATHING CAPACITY TEST: CPT

## 2025-05-05 DIAGNOSIS — E78.49 OTHER HYPERLIPIDEMIA: ICD-10-CM

## 2025-05-05 RX ORDER — ATORVASTATIN CALCIUM 40 MG/1
40 TABLET, FILM COATED ORAL
Qty: 30 | Refills: 3 | Status: ACTIVE | COMMUNITY
Start: 2025-05-05 | End: 1900-01-01

## 2025-05-06 NOTE — ED ADULT TRIAGE NOTE - CCCP TRG CHIEF CMPLNT
Take antibiotics until gone  Consider a probiotic or yogurt while on antibotics try to prevent diarrhea  Mucinex plain or Max 2 times a day for 5-7 days for congestion  Consider saline nasal spray to moisturize sinus  Drink plenty of fluids.   Tylenol/Iduprofen/naproxen for fever and pain  If Chest pain go to ED    
body aches/diarrhea

## 2025-05-07 ENCOUNTER — NON-APPOINTMENT (OUTPATIENT)
Age: 40
End: 2025-05-07

## 2025-05-09 ENCOUNTER — APPOINTMENT (OUTPATIENT)
Dept: NEUROLOGY | Facility: CLINIC | Age: 40
End: 2025-05-09
Payer: MEDICAID

## 2025-05-09 PROCEDURE — 95912 NRV CNDJ TEST 11-12 STUDIES: CPT

## 2025-05-09 PROCEDURE — 95886 MUSC TEST DONE W/N TEST COMP: CPT

## 2025-05-09 NOTE — BH INPATIENT PSYCHIATRY PROGRESS NOTE - NSTXDEPRESGOAL_PSY_ALL_CORE
Will identify 2 coping skills that assist in improving mood
Will identify 2 coping skills that assist in improving mood
None
Will identify 2 coping skills that assist in improving mood

## 2025-05-09 NOTE — BH PATIENT PROFILE - NSSBIRTDRGPOSREINDET_GEN_A_CORE
Writer placed call to relay the following message:    Please let patient know Reclast has been approved. [facility] or [facility] will most likely be her preferred options.     Spoke to patient's daughter and provided [facility] scheduling number.  
maintain abstinence

## 2025-05-12 ENCOUNTER — APPOINTMENT (OUTPATIENT)
Dept: GASTROENTEROLOGY | Facility: CLINIC | Age: 40
End: 2025-05-12
Payer: MEDICAID

## 2025-05-12 VITALS
SYSTOLIC BLOOD PRESSURE: 132 MMHG | BODY MASS INDEX: 33.29 KG/M2 | WEIGHT: 195 LBS | HEIGHT: 64 IN | HEART RATE: 79 BPM | DIASTOLIC BLOOD PRESSURE: 78 MMHG

## 2025-05-12 DIAGNOSIS — G47.30 SLEEP APNEA, UNSPECIFIED: ICD-10-CM

## 2025-05-12 DIAGNOSIS — R79.89 OTHER SPECIFIED ABNORMAL FINDINGS OF BLOOD CHEMISTRY: ICD-10-CM

## 2025-05-12 DIAGNOSIS — Z95.818 PRESENCE OF OTHER CARDIAC IMPLANTS AND GRAFTS: ICD-10-CM

## 2025-05-12 DIAGNOSIS — D64.9 ANEMIA, UNSPECIFIED: ICD-10-CM

## 2025-05-12 PROCEDURE — 99214 OFFICE O/P EST MOD 30 MIN: CPT

## 2025-05-12 RX ORDER — AZELASTINE HYDROCHLORIDE 137 UG/1
0.1 SPRAY, METERED NASAL
Refills: 0 | Status: ACTIVE | COMMUNITY

## 2025-05-12 RX ORDER — KRILL/OM-3/DHA/EPA/PHOSPHO/AST 1000-230MG
81 CAPSULE ORAL
Refills: 0 | Status: ACTIVE | COMMUNITY

## 2025-05-13 ENCOUNTER — RX RENEWAL (OUTPATIENT)
Age: 40
End: 2025-05-13

## 2025-05-14 ENCOUNTER — OUTPATIENT (OUTPATIENT)
Dept: OUTPATIENT SERVICES | Facility: HOSPITAL | Age: 40
LOS: 1 days | End: 2025-05-14
Payer: MEDICAID

## 2025-05-14 ENCOUNTER — APPOINTMENT (OUTPATIENT)
Dept: PSYCHIATRY | Facility: CLINIC | Age: 40
End: 2025-05-14

## 2025-05-14 DIAGNOSIS — F41.9 ANXIETY DISORDER, UNSPECIFIED: ICD-10-CM

## 2025-05-14 DIAGNOSIS — F32.A DEPRESSION, UNSPECIFIED: ICD-10-CM

## 2025-05-14 DIAGNOSIS — F41.0 PANIC DISORDER [EPISODIC PAROXYSMAL ANXIETY]: ICD-10-CM

## 2025-05-14 DIAGNOSIS — F41.1 GENERALIZED ANXIETY DISORDER: ICD-10-CM

## 2025-05-14 PROCEDURE — 90832 PSYTX W PT 30 MINUTES: CPT

## 2025-05-15 ENCOUNTER — APPOINTMENT (OUTPATIENT)
Dept: PSYCHIATRY | Facility: CLINIC | Age: 40
End: 2025-05-15

## 2025-05-15 DIAGNOSIS — F32.A DEPRESSION, UNSPECIFIED: ICD-10-CM

## 2025-05-15 DIAGNOSIS — F41.9 ANXIETY DISORDER, UNSPECIFIED: ICD-10-CM

## 2025-05-15 DIAGNOSIS — F41.0 PANIC DISORDER [EPISODIC PAROXYSMAL ANXIETY]: ICD-10-CM

## 2025-05-18 ENCOUNTER — EMERGENCY (EMERGENCY)
Facility: HOSPITAL | Age: 40
LOS: 0 days | Discharge: HOME | End: 2025-05-18
Attending: EMERGENCY MEDICINE
Payer: MEDICAID

## 2025-05-18 VITALS
HEART RATE: 88 BPM | DIASTOLIC BLOOD PRESSURE: 82 MMHG | SYSTOLIC BLOOD PRESSURE: 149 MMHG | RESPIRATION RATE: 19 BRPM | OXYGEN SATURATION: 99 % | HEIGHT: 64 IN | TEMPERATURE: 99 F

## 2025-05-18 DIAGNOSIS — G43.909 MIGRAINE, UNSPECIFIED, NOT INTRACTABLE, WITHOUT STATUS MIGRAINOSUS: ICD-10-CM

## 2025-05-18 DIAGNOSIS — R07.89 OTHER CHEST PAIN: ICD-10-CM

## 2025-05-18 DIAGNOSIS — Z88.1 ALLERGY STATUS TO OTHER ANTIBIOTIC AGENTS: ICD-10-CM

## 2025-05-18 DIAGNOSIS — Z88.0 ALLERGY STATUS TO PENICILLIN: ICD-10-CM

## 2025-05-18 DIAGNOSIS — R06.02 SHORTNESS OF BREATH: ICD-10-CM

## 2025-05-18 DIAGNOSIS — E11.9 TYPE 2 DIABETES MELLITUS WITHOUT COMPLICATIONS: ICD-10-CM

## 2025-05-18 DIAGNOSIS — T50.991A POISONING BY OTHER DRUGS, MEDICAMENTS AND BIOLOGICAL SUBSTANCES, ACCIDENTAL (UNINTENTIONAL), INITIAL ENCOUNTER: ICD-10-CM

## 2025-05-18 DIAGNOSIS — Z88.7 ALLERGY STATUS TO SERUM AND VACCINE: ICD-10-CM

## 2025-05-18 LAB
ALBUMIN SERPL ELPH-MCNC: 4.1 G/DL — SIGNIFICANT CHANGE UP (ref 3.5–5.2)
ALP SERPL-CCNC: 188 U/L — HIGH (ref 30–115)
ALT FLD-CCNC: 40 U/L — SIGNIFICANT CHANGE UP (ref 0–41)
ANION GAP SERPL CALC-SCNC: 12 MMOL/L — SIGNIFICANT CHANGE UP (ref 7–14)
AST SERPL-CCNC: 24 U/L — SIGNIFICANT CHANGE UP (ref 0–41)
BASOPHILS # BLD AUTO: 0.05 K/UL — SIGNIFICANT CHANGE UP (ref 0–0.2)
BASOPHILS NFR BLD AUTO: 0.5 % — SIGNIFICANT CHANGE UP (ref 0–1)
BILIRUB SERPL-MCNC: <0.2 MG/DL — SIGNIFICANT CHANGE UP (ref 0.2–1.2)
BUN SERPL-MCNC: 9 MG/DL — LOW (ref 10–20)
CALCIUM SERPL-MCNC: 9 MG/DL — SIGNIFICANT CHANGE UP (ref 8.4–10.5)
CHLORIDE SERPL-SCNC: 101 MMOL/L — SIGNIFICANT CHANGE UP (ref 98–110)
CO2 SERPL-SCNC: 23 MMOL/L — SIGNIFICANT CHANGE UP (ref 17–32)
CREAT SERPL-MCNC: 0.5 MG/DL — LOW (ref 0.7–1.5)
EGFR: 122 ML/MIN/1.73M2 — SIGNIFICANT CHANGE UP
EGFR: 122 ML/MIN/1.73M2 — SIGNIFICANT CHANGE UP
EOSINOPHIL # BLD AUTO: 0.31 K/UL — SIGNIFICANT CHANGE UP (ref 0–0.7)
EOSINOPHIL NFR BLD AUTO: 3.2 % — SIGNIFICANT CHANGE UP (ref 0–8)
GLUCOSE SERPL-MCNC: 208 MG/DL — HIGH (ref 70–99)
HCG SERPL QL: NEGATIVE — SIGNIFICANT CHANGE UP
HCT VFR BLD CALC: 38.6 % — SIGNIFICANT CHANGE UP (ref 37–47)
HGB BLD-MCNC: 12.9 G/DL — SIGNIFICANT CHANGE UP (ref 12–16)
IMM GRANULOCYTES NFR BLD AUTO: 0.6 % — HIGH (ref 0.1–0.3)
LYMPHOCYTES # BLD AUTO: 2.25 K/UL — SIGNIFICANT CHANGE UP (ref 1.2–3.4)
LYMPHOCYTES # BLD AUTO: 22.9 % — SIGNIFICANT CHANGE UP (ref 20.5–51.1)
MCHC RBC-ENTMCNC: 28.9 PG — SIGNIFICANT CHANGE UP (ref 27–31)
MCHC RBC-ENTMCNC: 33.4 G/DL — SIGNIFICANT CHANGE UP (ref 32–37)
MCV RBC AUTO: 86.4 FL — SIGNIFICANT CHANGE UP (ref 81–99)
MONOCYTES # BLD AUTO: 0.72 K/UL — HIGH (ref 0.1–0.6)
MONOCYTES NFR BLD AUTO: 7.3 % — SIGNIFICANT CHANGE UP (ref 1.7–9.3)
NEUTROPHILS # BLD AUTO: 6.43 K/UL — SIGNIFICANT CHANGE UP (ref 1.4–6.5)
NEUTROPHILS NFR BLD AUTO: 65.5 % — SIGNIFICANT CHANGE UP (ref 42.2–75.2)
NRBC BLD AUTO-RTO: 0 /100 WBCS — SIGNIFICANT CHANGE UP (ref 0–0)
NT-PROBNP SERPL-SCNC: 47 PG/ML — SIGNIFICANT CHANGE UP (ref 0–300)
PLATELET # BLD AUTO: 367 K/UL — SIGNIFICANT CHANGE UP (ref 130–400)
PMV BLD: 10 FL — SIGNIFICANT CHANGE UP (ref 7.4–10.4)
POTASSIUM SERPL-MCNC: 4.4 MMOL/L — SIGNIFICANT CHANGE UP (ref 3.5–5)
POTASSIUM SERPL-SCNC: 4.4 MMOL/L — SIGNIFICANT CHANGE UP (ref 3.5–5)
PROT SERPL-MCNC: 7.4 G/DL — SIGNIFICANT CHANGE UP (ref 6–8)
RBC # BLD: 4.47 M/UL — SIGNIFICANT CHANGE UP (ref 4.2–5.4)
RBC # FLD: 13.2 % — SIGNIFICANT CHANGE UP (ref 11.5–14.5)
SODIUM SERPL-SCNC: 136 MMOL/L — SIGNIFICANT CHANGE UP (ref 135–146)
TROPONIN T, HIGH SENSITIVITY RESULT: <6 NG/L — SIGNIFICANT CHANGE UP (ref 6–13)
WBC # BLD: 9.82 K/UL — SIGNIFICANT CHANGE UP (ref 4.8–10.8)
WBC # FLD AUTO: 9.82 K/UL — SIGNIFICANT CHANGE UP (ref 4.8–10.8)

## 2025-05-18 PROCEDURE — 71046 X-RAY EXAM CHEST 2 VIEWS: CPT | Mod: 26

## 2025-05-18 PROCEDURE — 80053 COMPREHEN METABOLIC PANEL: CPT

## 2025-05-18 PROCEDURE — 85025 COMPLETE CBC W/AUTO DIFF WBC: CPT

## 2025-05-18 PROCEDURE — 71046 X-RAY EXAM CHEST 2 VIEWS: CPT

## 2025-05-18 PROCEDURE — 84703 CHORIONIC GONADOTROPIN ASSAY: CPT

## 2025-05-18 PROCEDURE — 99284 EMERGENCY DEPT VISIT MOD MDM: CPT

## 2025-05-18 PROCEDURE — 36415 COLL VENOUS BLD VENIPUNCTURE: CPT

## 2025-05-18 PROCEDURE — 84484 ASSAY OF TROPONIN QUANT: CPT

## 2025-05-18 PROCEDURE — 99285 EMERGENCY DEPT VISIT HI MDM: CPT | Mod: 25

## 2025-05-18 PROCEDURE — 93005 ELECTROCARDIOGRAM TRACING: CPT

## 2025-05-18 PROCEDURE — 93010 ELECTROCARDIOGRAM REPORT: CPT

## 2025-05-18 PROCEDURE — 83880 ASSAY OF NATRIURETIC PEPTIDE: CPT

## 2025-05-18 PROCEDURE — 96374 THER/PROPH/DIAG INJ IV PUSH: CPT

## 2025-05-18 RX ORDER — KETOROLAC TROMETHAMINE 30 MG/ML
15 INJECTION, SOLUTION INTRAMUSCULAR; INTRAVENOUS ONCE
Refills: 0 | Status: DISCONTINUED | OUTPATIENT
Start: 2025-05-18 | End: 2025-05-18

## 2025-05-18 RX ADMIN — KETOROLAC TROMETHAMINE 15 MILLIGRAM(S): 30 INJECTION, SOLUTION INTRAMUSCULAR; INTRAVENOUS at 14:24

## 2025-05-18 NOTE — ED ADULT NURSE NOTE - OBJECTIVE STATEMENT
gabi from [facility] pt was given an extra dose of her migraine medicine aimovig pt was suppose to get 70 mg received 140 mg instead ; pt only complains of pain @ injection site

## 2025-05-18 NOTE — ED PROVIDER NOTE - CLINICAL SUMMARY MEDICAL DECISION MAKING FREE TEXT BOX
Patient is a 40-year-old female with a past medical history of diabetes, migraines and sciatica.  She receives a monthly injection of 70mg of Aimovig for her migraines.  At the facility where she resides, she was accidentally given another patient's dose of the Aimovig and she received double the dose (received 140mg of Aimovig).  The patient presents to the emergency department because she received double dose and since she received that injection she is complaining of pain to her arm and shortness of breath.  She is also complaining of chest pain but she has been having that off and on for a couple of weeks.  On our exam, patient is alert and oriented x 3 no acute distress.  Heart regular lungs clear to auscultation.  Patient observed  Labs obtained  Consulted toxicology

## 2025-05-18 NOTE — ED PROVIDER NOTE - NSFOLLOWUPINSTRUCTIONS_ED_ALL_ED_FT
Please follow up with your primary care physician within 24-72 hours and return immediately if symptoms worsen.     Shortness of Breath    WHAT YOU NEED TO KNOW:    Shortness of breath is a feeling that you cannot get enough air when you breathe in. You may have this feeling only during activity, or all the time. Your symptoms can range from mild to severe. Shortness of breath may be a sign of a serious health condition that needs immediate care.    DISCHARGE INSTRUCTIONS:    Return to the emergency department if:     Your signs and symptoms are the same or worse within 24 hours of treatment.       The skin over your ribs or on your neck sinks in when you breathe.       You feel confused or dizzy.    Contact your healthcare provider if:     You have new or worsening symptoms.      You have questions or concerns about your condition or care.    Medicines:     Medicines may be used to treat the cause of your symptoms. You may need medicine to treat a bacterial infection or reduce anxiety. Other medicines may be used to open your airway, reduce swelling, or remove extra fluid. If you have a heart condition, you may need medicine to help your heart beat more strongly or regularly.      Take your medicine as directed. Contact your healthcare provider if you think your medicine is not helping or if you have side effects. Tell him or her if you are allergic to any medicine. Keep a list of the medicines, vitamins, and herbs you take. Include the amounts, and when and why you take them. Bring the list or the pill bottles to follow-up visits. Carry your medicine list with you in case of an emergency.    Manage shortness of breath:     Create an action plan. You and your healthcare provider can work together to create a plan for how to handle shortness of breath. The plan can include daily activities, treatment changes, and what to do if you have severe breathing problems.      Lean forward on your elbows when you sit. This helps your lungs expand and may make it easier to breathe.      Use pursed-lip breathing any time you feel short of breath. Breathe in through your nose and then slowly breathe out through your mouth with your lips slightly puckered. It should take you twice as long to breathe out as it did to breathe in.Breathe in Breathe out           Do not smoke. Nicotine and other chemicals in cigarettes and cigars can cause lung damage and make shortness of breath worse. Ask your healthcare provider for information if you currently smoke and need help to quit. E-cigarettes or smokeless tobacco still contain nicotine. Talk to your healthcare provider before you use these products.      Reach or maintain a healthy weight. Your healthcare provider can help you create a safe weight loss plan if you are overweight.      Exercise as directed. Exercise can help your lungs work more easily. Exercise can also help you lose weight if needed. Try to get at least 30 minutes of exercise most days of the week.    Follow up with your healthcare provider or specialist as directed: Write down your questions so you remember to ask them during your visits.       © Copyright [employer] 2019 All illustrations and images included in CareNotes are the copyrighted property of [employer] or [employer].

## 2025-05-18 NOTE — ED PROVIDER NOTE - NSDCPRINTRESULTS_ED_ALL_ED
Patient requests all Lab, Cardiology, and Radiology Results on their Discharge Instructions Adbry Pregnancy And Lactation Text: It is unknown if this medication will adversely affect pregnancy or breast feeding.

## 2025-05-18 NOTE — ED PROVIDER NOTE - PATIENT PORTAL LINK FT
You can access the FollowMyHealth Patient Portal offered by [facility] by registering at the following website: [email or web address]. By joining [facility] FollowMyHealth portal, you will also be able to view your health information using other applications (apps) compatible with our system.

## 2025-05-18 NOTE — ED PROVIDER NOTE - OBJECTIVE STATEMENT
40-year-old female with a past medical history of diabetes, migraines, sciatica presents due to receiving a double dose of Aimovig today. Patient usually gets a monthly dose Aimovig 70 Mg and today was given 140 Mg.  Patient states to have pain to the injection site/shortness of breath/chest pain. pt denies any other symptoms including fevers, chill, headache, recent illness/travel, cough, abdominal pain.

## 2025-05-18 NOTE — ED PROVIDER NOTE - NS ED ATTENDING STATEMENT MOD
Quality 226: Preventive Care And Screening: Tobacco Use: Screening And Cessation Intervention: Patient screened for tobacco use and is an ex/non-smoker Detail Level: Detailed This was a shared visit with the JABARI. I reviewed and verified the documentation.

## 2025-05-18 NOTE — ED ADULT TRIAGE NOTE - CHIEF COMPLAINT QUOTE
gabi from [facility] pt was given an extra dose of her migraine medicine aimovig pt was suppose to get 70 mg received 140 mg instead ; pt only complains of pain @ injection site

## 2025-05-18 NOTE — ED ADULT NURSE NOTE - CHIEF COMPLAINT QUOTE
gabi from [facility] pt was given an extra dose of her migraine medicine aimovig pt was suppose to get 70 mg received 140 mg instead ; pt only complains of pain @ injection site

## 2025-05-18 NOTE — CONSULT NOTE ADULT - ASSESSMENT
Based on limited overdose data, it is anticipated the overdose effects will be similar to adverse effects reported: injection site reactions (pain, erythema, pruritis), and constipation. Supportive care and local wound care as needed. Further medical and/or psychiatric care as per primary team.    Thank you for involving us in the care of this patient. Assessment and plan discussed with toxicology attending Dr. Min Mayes. Please do not hesitate to reach out to the toxicology team for any further questions or concerns.    The On-Call Toxicology Fellow can be reached 24/7 via Pager #[phone]  Please send a 10 digit call back # as [place] cover multiple hospitals    Marco Mendez MD  Toxicology Fellow  PGY-5

## 2025-05-18 NOTE — CONSULT NOTE ADULT - SUBJECTIVE AND OBJECTIVE BOX
MEDICAL TOXICOLOGY CONSULT    HPI:  40 year old female brought in by ambulance from [facility] s/p overdose. As per report, the patient was given an extra dose of her migraine medicine Aimovig pt was suppose to get 70 mg received 140 mg instead ; pt only complains of pain at the injection site. No other complaints at this time.    PAST MEDICAL & SURGICAL HISTORY:  Neuropathy  right lower extremity, vaginal      Type 1 diabetes      Degenerative disc disease, thoracic      Urinary tract infection, recurrent      Gastroesophageal reflux disease, esophagitis presence not specified      Intractable headache, unspecified chronicity pattern, unspecified headache type      Tremor of right hand      Tachycardia      Spinal stenosis      No significant past surgical history          MEDICATION HISTORY:      FAMILY HISTORY:      REVIEW OF SYSTEMS:   As per ED provider      Vital Signs Last 24 Hrs  T(C): 37.1 (18 May 2025 10:57), Max: 37.1 (18 May 2025 10:57)  T(F): 98.7 (18 May 2025 10:57), Max: 98.7 (18 May 2025 10:57)  HR: 88 (18 May 2025 10:57) (88 - 88)  BP: 149/82 (18 May 2025 10:57) (149/82 - 149/82)  BP(mean): --  RR: 19 (18 May 2025 10:57) (19 - 19)  SpO2: 99% (18 May 2025 10:57) (99% - 99%)    Parameters below as of 18 May 2025 10:57  Patient On (Oxygen Delivery Method): room air        SIGNIFICANT LABORATORY STUDIES:

## 2025-05-19 ENCOUNTER — NON-APPOINTMENT (OUTPATIENT)
Age: 40
End: 2025-05-19

## 2025-05-19 ENCOUNTER — APPOINTMENT (OUTPATIENT)
Dept: CARDIOLOGY | Facility: CLINIC | Age: 40
End: 2025-05-19
Payer: MEDICAID

## 2025-05-19 ENCOUNTER — RX RENEWAL (OUTPATIENT)
Age: 40
End: 2025-05-19

## 2025-05-19 PROCEDURE — 93298 REM INTERROG DEV EVAL SCRMS: CPT

## 2025-05-19 RX ORDER — BUDESONIDE AND FORMOTEROL FUMARATE DIHYDRATE 160; 4.5 UG/1; UG/1
160-4.5 AEROSOL RESPIRATORY (INHALATION) TWICE DAILY
Qty: 1 | Refills: 1 | Status: ACTIVE | COMMUNITY
Start: 1900-01-01 | End: 1900-01-01

## 2025-05-22 NOTE — ED ADULT NURSE NOTE - TEMPLATE
"                                                                                                             05/22/2025  Mary Jane Cross is a 6 y.o., female.    Pre-operative evaluation for Procedure(s) (LRB):  STRABISMUS SURGERY, 1 MUSCLE EACH EYE (Bilateral)    Mary Jane Cross is a 6 y.o. female       Prev airway: none on record      2D Echo: none on record      EKG: none on record        Problem List[1]    Review of patient's allergies indicates:  No Known Allergies    Past Surgical History:   Procedure Laterality Date    NO PAST SURGERIES           Vital Signs:         CBC: No results for input(s): "WBC", "RBC", "HGB", "HCT", "PLT", "MCV", "MCH", "MCHC" in the last 72 hours.    CMP: No results for input(s): "NA", "K", "CL", "CO2", "BUN", "CREATININE", "GLU", "MG", "PHOS", "CALCIUM", "ALBUMIN", "PROT", "ALKPHOS", "ALT", "AST", "BILITOT" in the last 72 hours.    INR  No results for input(s): "PT", "INR", "PROTIME", "APTT" in the last 72 hours.              Pre-op Assessment    I have reviewed the Patient Summary Reports.     I have reviewed the Nursing Notes. I have reviewed the NPO Status.   I have reviewed the Medications.     Review of Systems  Anesthesia Hx:  No previous Anesthesia             Denies Family Hx of Anesthesia complications.    Denies Personal Hx of Anesthesia complications.                    Hematology/Oncology:  Hematology Normal   Oncology Normal                                   EENT/Dental:  EENT/Dental Normal           Cardiovascular:  Cardiovascular Normal                                              Pulmonary:  Pulmonary Normal                       Hepatic/GI:  Hepatic/GI Normal                    Neurological:  Neurology Normal                                      Endocrine:  Endocrine Normal                Physical Exam  General: Well nourished, Cooperative and Alert    Airway:  Mallampati: I / I  Mouth Opening: Normal  TM Distance: Normal  Tongue: Normal  Neck ROM: Normal " ROM    Dental:  Loose teeth        Anesthesia Plan  Type of Anesthesia, risks & benefits discussed:    Anesthesia Type: Gen Supraglottic Airway, Gen ETT  Intra-op Monitoring Plan: Standard ASA Monitors  Post Op Pain Control Plan: multimodal analgesia and IV/PO Opioids PRN  Induction:  Inhalation  Airway Plan: , Post-Induction  ASA Score: 1  Day of Surgery Review of History & Physical: H&P Update referred to the surgeon/provider.    Ready For Surgery From Anesthesia Perspective.     .           [1]   Patient Active Problem List  Diagnosis    Infantile eczema    Exotropia of right eye      General

## 2025-05-29 ENCOUNTER — APPOINTMENT (OUTPATIENT)
Dept: PSYCHIATRY | Facility: CLINIC | Age: 40
End: 2025-05-29
Payer: MEDICAID

## 2025-05-29 ENCOUNTER — OUTPATIENT (OUTPATIENT)
Dept: OUTPATIENT SERVICES | Facility: HOSPITAL | Age: 40
LOS: 1 days | End: 2025-05-29
Payer: MEDICAID

## 2025-05-29 DIAGNOSIS — F32.A DEPRESSION, UNSPECIFIED: ICD-10-CM

## 2025-05-29 DIAGNOSIS — F41.1 GENERALIZED ANXIETY DISORDER: ICD-10-CM

## 2025-05-29 DIAGNOSIS — F60.3 BORDERLINE PERSONALITY DISORDER: ICD-10-CM

## 2025-05-29 DIAGNOSIS — F41.0 GENERALIZED ANXIETY DISORDER: ICD-10-CM

## 2025-05-29 PROCEDURE — 98007 SYNCH AUDIO-VIDEO EST HI 40: CPT

## 2025-05-29 PROCEDURE — 99214 OFFICE O/P EST MOD 30 MIN: CPT | Mod: 93

## 2025-05-30 DIAGNOSIS — F41.1 GENERALIZED ANXIETY DISORDER: ICD-10-CM

## 2025-06-02 NOTE — PATIENT PROFILE ADULT - FUNCTIONAL ASSESSMENT - BASIC MOBILITY 1.
[FreeTextEntry1] : Right X-Ray Examination of the KNEE (4 views): there are no fractures, subluxations or dislocations.   - We discussed their diagnosis and treatment options at length including the risks and benefits of both surgical and non-surgical options. Surgical risks include but are not limited to pain, infection, bleeding, vascular injury, numbness, tingling, nerve damage. - Due to the risk of surgery, they will continue conservative treatment with icing, anti-inflammatory medication, and PT - The patient was provided with a prescription to work on hip ER/abductors strengthening along with quad/hamstring stretches and strengthening - diclofenac rx - Patient was given a prescription for an anti-inflammatory medication.  They will take it for the next week and then on an as needed basis, as long as there are no medical contra-indications.  Patient is counseled on possible GI, renal, and cardiovascular side effects. - The patient was advised to let pain guide the gradual advancement of activities. - Follow up as needed in 6 weeks to re-evaluate. 4 = No assist / stand by assistance

## 2025-06-04 ENCOUNTER — APPOINTMENT (OUTPATIENT)
Dept: PSYCHIATRY | Facility: CLINIC | Age: 40
End: 2025-06-04

## 2025-06-09 ENCOUNTER — RX RENEWAL (OUTPATIENT)
Age: 40
End: 2025-06-09

## 2025-06-09 NOTE — ED ADULT TRIAGE NOTE - RESPIRATORY RATE (BREATHS/MIN)
[facility] Rose MD [facility] GRAYProvidence Portland Medical CenterREGINA Deedee Vang MD at Premier Health Miami Valley 

## 2025-06-11 ENCOUNTER — APPOINTMENT (OUTPATIENT)
Dept: PSYCHIATRY | Facility: CLINIC | Age: 40
End: 2025-06-11

## 2025-06-16 ENCOUNTER — APPOINTMENT (OUTPATIENT)
Dept: NEUROLOGY | Facility: CLINIC | Age: 40
End: 2025-06-16

## 2025-06-18 ENCOUNTER — APPOINTMENT (OUTPATIENT)
Dept: ENDOCRINOLOGY | Facility: CLINIC | Age: 40
End: 2025-06-18
Payer: MEDICAID

## 2025-06-18 PROCEDURE — 99212 OFFICE O/P EST SF 10 MIN: CPT | Mod: 93

## 2025-06-20 NOTE — ED ADULT NURSE NOTE - COVID-19  TEST TYPE
Speech Language Pathology Treatment    Patient Name:  Laila Hanna   MRN:  [id number]  Admitting Diagnosis: Acute respiratory failure with hypoxia and hypercarbia    Recommendations:                 General Recommendations:  Ongoing assessment of swallow; monitor need for objective assessment   Diet recommendations:  NPO, Liquid Diet Level: NPO   Aspiration Precautions: Feed only when awake/alert, Frequent oral care, HOB to 90 degrees, Ice chips sparingly, Meds crushed in puree, and Strict aspiration precautions   General Precautions: Standard, aspiration, NPO  Communication strategies:  go to room if call light pushed  Discharge recommendations:  Moderate Intensity Therapy   Barriers to Discharge:  Level of Skilled Assistance Needed      Assessment:     Laila Hanna is a 89 y.o. female with an SLP diagnosis of Dysphagia.  She presents with increased levels of alertness and participation this date compared to clinical evaluation in previous session. Pt demonstrated overt signs of aspiration across low level PO. Pt may benefit from objective assessment of swallow function pending pt ability to sustain adequate levels of alertness. SLP to follow for ongoing assessment.    Subjective     Pt awake/alert with RN performing oral care upon SLP arrival. RN messaged SLP via secure chat indicating pt with increased levels of alertness this AM.     Pain/Comfort:  Pain Rating 1: 0/10    Respiratory Status: Nasal cannula, flow 2 L/min    Objective:     Has the patient been evaluated by SLP for swallowing?   Yes  Keep patient NPO? Yes     Pt seen for ongoing assessment of swallow function this date. Pt with significant increase in levels of rousal/alertness compared to previous session per RN. Pt able to participate in simple conversation and follow simple commands this date. Pt with good recall of current admit and personal biographical information. Pt endorsed an unrestricted diet at NH prior to current admission. HOB raised  prior to PO trials. Ice chip trials administered x3 resulting in pt demonstrating multiple spontaneous swallows (2-3) per bolus with immediate throat clearing across trials. Pt additionally demonstrated 5 large belches following second ice chip trial. Thin liquids via tsp x2 resulted in delayed wet coughing accompanied by wet vocal quality. Spontaneous coughing was successful in clearing vocal quality. Puree trials via tsp x4 administered yielded similar results as tsp trials of thin liquids in 2/4 trials. Pt additionally required frequent cueing to attend to bolus and swallow prior to speaking. PO trials discontinued 2/2 concern for airway protection. SLP educated pt re: SLP services, NPO rec, ice chips sparingly for comfort, meds crushed in puree, anatomy/physiology of oropharyngeal swallow, aspiration risks and precautions, overt signs of aspiration, possible objective assessment, and poc to which she verbalized understanding though will benefit from reinforcement. Pt may benefit from objective assessment of swallow function as able given fluctuating mentation and overt signs of aspiration across consistencies. SLP to follow for ongoing assessment.     Goals:   Multidisciplinary Problems       SLP Goals          Problem: SLP    Goal Priority Disciplines Outcome   SLP Goal     SLP Progressing   Description: Speech Pathology Goals  To be met by 7/3/25    1. Pt will participate in ongoing diagnostic dysphagia therapy                              Plan:     Patient to be seen:  4 x/week   Plan of Care expires:  07/19/25  Plan of Care reviewed with:  patient   SLP Follow-Up:  Yes       Time Tracking:     SLP Treatment Date:   06/20/25  Speech Start Time:  0903  Speech Stop Time:  0932     Speech Total Time (min):  29 min    Billable Minutes: Treatment Swallowing Dysfunction 6 and Self Care/Home Management Training 23 06/20/2025     MOLECULAR PCR

## 2025-06-23 ENCOUNTER — APPOINTMENT (OUTPATIENT)
Dept: CARDIOLOGY | Facility: CLINIC | Age: 40
End: 2025-06-23

## 2025-06-23 ENCOUNTER — NON-APPOINTMENT (OUTPATIENT)
Age: 40
End: 2025-06-23

## 2025-06-23 PROCEDURE — 93298 REM INTERROG DEV EVAL SCRMS: CPT

## 2025-06-30 ENCOUNTER — RX RENEWAL (OUTPATIENT)
Age: 40
End: 2025-06-30

## 2025-07-02 ENCOUNTER — APPOINTMENT (OUTPATIENT)
Dept: NEUROLOGY | Facility: CLINIC | Age: 40
End: 2025-07-02
Payer: MEDICAID

## 2025-07-02 PROCEDURE — 99213 OFFICE O/P EST LOW 20 MIN: CPT | Mod: 95

## 2025-07-03 ENCOUNTER — NON-APPOINTMENT (OUTPATIENT)
Age: 40
End: 2025-07-03

## 2025-07-08 ENCOUNTER — RESULT REVIEW (OUTPATIENT)
Age: 40
End: 2025-07-08

## 2025-07-08 ENCOUNTER — OUTPATIENT (OUTPATIENT)
Dept: OUTPATIENT SERVICES | Facility: HOSPITAL | Age: 40
LOS: 1 days | End: 2025-07-08
Payer: MEDICAID

## 2025-07-08 DIAGNOSIS — R79.89 OTHER SPECIFIED ABNORMAL FINDINGS OF BLOOD CHEMISTRY: ICD-10-CM

## 2025-07-08 DIAGNOSIS — Z00.8 ENCOUNTER FOR OTHER GENERAL EXAMINATION: ICD-10-CM

## 2025-07-08 PROCEDURE — 76705 ECHO EXAM OF ABDOMEN: CPT

## 2025-07-08 PROCEDURE — 76705 ECHO EXAM OF ABDOMEN: CPT | Mod: 26

## 2025-07-09 ENCOUNTER — APPOINTMENT (OUTPATIENT)
Dept: OPHTHALMOLOGY | Facility: CLINIC | Age: 40
End: 2025-07-09
Payer: MEDICAID

## 2025-07-09 ENCOUNTER — OUTPATIENT (OUTPATIENT)
Dept: OUTPATIENT SERVICES | Facility: HOSPITAL | Age: 40
LOS: 1 days | End: 2025-07-09
Payer: MEDICAID

## 2025-07-09 ENCOUNTER — RX RENEWAL (OUTPATIENT)
Age: 40
End: 2025-07-09

## 2025-07-09 DIAGNOSIS — H04.129 DRY EYE SYNDROME OF UNSPECIFIED LACRIMAL GLAND: ICD-10-CM

## 2025-07-09 DIAGNOSIS — E11.9 TYPE 2 DIABETES MELLITUS WITHOUT COMPLICATIONS: ICD-10-CM

## 2025-07-09 DIAGNOSIS — R79.89 OTHER SPECIFIED ABNORMAL FINDINGS OF BLOOD CHEMISTRY: ICD-10-CM

## 2025-07-09 DIAGNOSIS — H52.209 UNSPECIFIED ASTIGMATISM, UNSPECIFIED EYE: ICD-10-CM

## 2025-07-09 DIAGNOSIS — H53.8 OTHER VISUAL DISTURBANCES: ICD-10-CM

## 2025-07-09 PROCEDURE — 92014 COMPRE OPH EXAM EST PT 1/>: CPT

## 2025-07-09 PROCEDURE — 92134 CPTRZ OPH DX IMG PST SGM RTA: CPT

## 2025-07-09 PROCEDURE — 92134 CPTRZ OPH DX IMG PST SGM RTA: CPT | Mod: 26

## 2025-07-11 ENCOUNTER — APPOINTMENT (OUTPATIENT)
Dept: OTOLARYNGOLOGY | Facility: CLINIC | Age: 40
End: 2025-07-11

## 2025-07-11 NOTE — ED ADULT NURSE NOTE - NS_SISCREENINGSR_GEN_ALL_ED
[Right] : right hand dominant [FreeTextEntry1] : He returns today with pain in his right wrist, which radiates up his forearm. His symptoms are unchanged from his last visit, and sometimes worsen due to his work. He rates his pain a 4/10, and denies any numbness/tingling.  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  He was treated with splinting and Celebrex 200 mg a day.   He has a medical history of prediabetes.    Negative

## 2025-07-14 ENCOUNTER — OUTPATIENT (OUTPATIENT)
Dept: OUTPATIENT SERVICES | Facility: HOSPITAL | Age: 40
LOS: 1 days | End: 2025-07-14
Payer: MEDICAID

## 2025-07-14 ENCOUNTER — APPOINTMENT (OUTPATIENT)
Dept: INTERNAL MEDICINE | Facility: CLINIC | Age: 40
End: 2025-07-14
Payer: MEDICAID

## 2025-07-14 VITALS
OXYGEN SATURATION: 96 % | HEART RATE: 86 BPM | WEIGHT: 207 LBS | HEIGHT: 64 IN | TEMPERATURE: 98.1 F | DIASTOLIC BLOOD PRESSURE: 82 MMHG | BODY MASS INDEX: 35.34 KG/M2 | SYSTOLIC BLOOD PRESSURE: 133 MMHG

## 2025-07-14 DIAGNOSIS — Z00.00 ENCOUNTER FOR GENERAL ADULT MEDICAL EXAMINATION WITHOUT ABNORMAL FINDINGS: ICD-10-CM

## 2025-07-14 PROBLEM — E78.5 HYPERLIPIDEMIA: Status: ACTIVE | Noted: 2025-07-14

## 2025-07-14 PROCEDURE — 99214 OFFICE O/P EST MOD 30 MIN: CPT

## 2025-07-14 PROCEDURE — G2211 COMPLEX E/M VISIT ADD ON: CPT | Mod: NC

## 2025-07-16 RX ORDER — ATORVASTATIN CALCIUM 40 MG/1
40 TABLET, FILM COATED ORAL
Qty: 30 | Refills: 5 | Status: ACTIVE | COMMUNITY
Start: 2025-07-14 | End: 1900-01-01

## 2025-07-17 ENCOUNTER — NON-APPOINTMENT (OUTPATIENT)
Age: 40
End: 2025-07-17

## 2025-07-17 DIAGNOSIS — R93.2 ABNORMAL FINDINGS ON DIAGNOSTIC IMAGING OF LIVER AND BILIARY TRACT: ICD-10-CM

## 2025-07-17 DIAGNOSIS — E11.9 TYPE 2 DIABETES MELLITUS WITHOUT COMPLICATIONS: ICD-10-CM

## 2025-07-17 DIAGNOSIS — R74.8 ABNORMAL LEVELS OF OTHER SERUM ENZYMES: ICD-10-CM

## 2025-07-17 DIAGNOSIS — E78.5 HYPERLIPIDEMIA, UNSPECIFIED: ICD-10-CM

## 2025-07-21 ENCOUNTER — EMERGENCY (EMERGENCY)
Facility: HOSPITAL | Age: 40
LOS: 0 days | Discharge: HOME | End: 2025-07-21
Attending: EMERGENCY MEDICINE
Payer: MEDICAID

## 2025-07-21 ENCOUNTER — APPOINTMENT (OUTPATIENT)
Dept: OBGYN | Facility: CLINIC | Age: 40
End: 2025-07-21

## 2025-07-21 VITALS
TEMPERATURE: 98 F | RESPIRATION RATE: 18 BRPM | HEART RATE: 84 BPM | DIASTOLIC BLOOD PRESSURE: 74 MMHG | WEIGHT: 207.9 LBS | HEIGHT: 64 IN | OXYGEN SATURATION: 95 % | SYSTOLIC BLOOD PRESSURE: 118 MMHG

## 2025-07-21 DIAGNOSIS — K76.0 FATTY (CHANGE OF) LIVER, NOT ELSEWHERE CLASSIFIED: ICD-10-CM

## 2025-07-21 DIAGNOSIS — E11.9 TYPE 2 DIABETES MELLITUS WITHOUT COMPLICATIONS: ICD-10-CM

## 2025-07-21 DIAGNOSIS — R10.13 EPIGASTRIC PAIN: ICD-10-CM

## 2025-07-21 DIAGNOSIS — N89.8 OTHER SPECIFIED NONINFLAMMATORY DISORDERS OF VAGINA: ICD-10-CM

## 2025-07-21 DIAGNOSIS — Z88.7 ALLERGY STATUS TO SERUM AND VACCINE: ICD-10-CM

## 2025-07-21 DIAGNOSIS — R07.9 CHEST PAIN, UNSPECIFIED: ICD-10-CM

## 2025-07-21 DIAGNOSIS — Z88.0 ALLERGY STATUS TO PENICILLIN: ICD-10-CM

## 2025-07-21 DIAGNOSIS — Z88.1 ALLERGY STATUS TO OTHER ANTIBIOTIC AGENTS: ICD-10-CM

## 2025-07-21 LAB
ALBUMIN SERPL ELPH-MCNC: 4.2 G/DL — SIGNIFICANT CHANGE UP (ref 3.5–5.2)
ALP SERPL-CCNC: 184 U/L — HIGH (ref 30–115)
ALT FLD-CCNC: 48 U/L — HIGH (ref 0–41)
APPEARANCE UR: CLEAR — SIGNIFICANT CHANGE UP
AST SERPL-CCNC: 29 U/L — SIGNIFICANT CHANGE UP (ref 0–41)
BASOPHILS # BLD AUTO: 0.05 K/UL — SIGNIFICANT CHANGE UP (ref 0–0.2)
BASOPHILS NFR BLD AUTO: 0.5 % — SIGNIFICANT CHANGE UP (ref 0–1)
BILIRUB SERPL-MCNC: 0.2 MG/DL — SIGNIFICANT CHANGE UP (ref 0.2–1.2)
BILIRUB UR-MCNC: NEGATIVE — SIGNIFICANT CHANGE UP
BUN SERPL-MCNC: 10 MG/DL — SIGNIFICANT CHANGE UP (ref 10–20)
CALCIUM SERPL-MCNC: 9 MG/DL — SIGNIFICANT CHANGE UP (ref 8.4–10.5)
CHLORIDE SERPL-SCNC: 102 MMOL/L — SIGNIFICANT CHANGE UP (ref 98–110)
CO2 SERPL-SCNC: 22 MMOL/L — SIGNIFICANT CHANGE UP (ref 17–32)
COLOR SPEC: YELLOW — SIGNIFICANT CHANGE UP
CREAT SERPL-MCNC: 0.5 MG/DL — LOW (ref 0.7–1.5)
DIFF PNL FLD: NEGATIVE — SIGNIFICANT CHANGE UP
EGFR: 122 ML/MIN/1.73M2 — SIGNIFICANT CHANGE UP
EGFR: 122 ML/MIN/1.73M2 — SIGNIFICANT CHANGE UP
EOSINOPHIL # BLD AUTO: 0.22 K/UL — SIGNIFICANT CHANGE UP (ref 0–0.7)
EOSINOPHIL NFR BLD AUTO: 2.1 % — SIGNIFICANT CHANGE UP (ref 0–8)
GLUCOSE SERPL-MCNC: 288 MG/DL — HIGH (ref 70–99)
GLUCOSE UR QL: >=1000 MG/DL
HCT VFR BLD CALC: 39.3 % — SIGNIFICANT CHANGE UP (ref 37–47)
HGB BLD-MCNC: 12.1 G/DL — SIGNIFICANT CHANGE UP (ref 12–16)
IMM GRANULOCYTES NFR BLD AUTO: 0.6 % — HIGH (ref 0.1–0.3)
KETONES UR QL: ABNORMAL MG/DL
LEUKOCYTE ESTERASE UR-ACNC: NEGATIVE — SIGNIFICANT CHANGE UP
LIDOCAIN IGE QN: 16 U/L — SIGNIFICANT CHANGE UP (ref 7–60)
LYMPHOCYTES # BLD AUTO: 1.91 K/UL — SIGNIFICANT CHANGE UP (ref 1.2–3.4)
LYMPHOCYTES # BLD AUTO: 17.9 % — LOW (ref 20.5–51.1)
MCHC RBC-ENTMCNC: 25.9 PG — LOW (ref 27–31)
MCHC RBC-ENTMCNC: 30.8 G/DL — LOW (ref 32–37)
MCV RBC AUTO: 84 FL — SIGNIFICANT CHANGE UP (ref 81–99)
MONOCYTES # BLD AUTO: 0.81 K/UL — HIGH (ref 0.1–0.6)
MONOCYTES NFR BLD AUTO: 7.6 % — SIGNIFICANT CHANGE UP (ref 1.7–9.3)
NEUTROPHILS # BLD AUTO: 7.65 K/UL — HIGH (ref 1.4–6.5)
NEUTROPHILS NFR BLD AUTO: 71.3 % — SIGNIFICANT CHANGE UP (ref 42.2–75.2)
NITRITE UR-MCNC: NEGATIVE — SIGNIFICANT CHANGE UP
NRBC BLD AUTO-RTO: 0 /100 WBCS — SIGNIFICANT CHANGE UP (ref 0–0)
PH UR: 5.5 — SIGNIFICANT CHANGE UP (ref 5–8)
PLATELET # BLD AUTO: 404 K/UL — HIGH (ref 130–400)
PMV BLD: 11 FL — HIGH (ref 7.4–10.4)
POTASSIUM SERPL-MCNC: 4.9 MMOL/L — SIGNIFICANT CHANGE UP (ref 3.5–5)
POTASSIUM SERPL-SCNC: 4.9 MMOL/L — SIGNIFICANT CHANGE UP (ref 3.5–5)
PROT SERPL-MCNC: 7.5 G/DL — SIGNIFICANT CHANGE UP (ref 6–8)
PROT UR-MCNC: NEGATIVE MG/DL — SIGNIFICANT CHANGE UP
RBC # BLD: 4.68 M/UL — SIGNIFICANT CHANGE UP (ref 4.2–5.4)
RBC # FLD: 13.6 % — SIGNIFICANT CHANGE UP (ref 11.5–14.5)
SODIUM SERPL-SCNC: 136 MMOL/L — SIGNIFICANT CHANGE UP (ref 135–146)
SP GR SPEC: >1.03 — HIGH (ref 1–1.03)
TROPONIN T, HIGH SENSITIVITY RESULT: <6 NG/L — SIGNIFICANT CHANGE UP (ref 6–13)
UROBILINOGEN FLD QL: 0.2 MG/DL — SIGNIFICANT CHANGE UP (ref 0.2–1)
WBC # BLD: 10.7 K/UL — SIGNIFICANT CHANGE UP (ref 4.8–10.8)
WBC # FLD AUTO: 10.7 K/UL — SIGNIFICANT CHANGE UP (ref 4.8–10.8)

## 2025-07-21 PROCEDURE — 99285 EMERGENCY DEPT VISIT HI MDM: CPT

## 2025-07-21 PROCEDURE — 99285 EMERGENCY DEPT VISIT HI MDM: CPT | Mod: 25

## 2025-07-21 PROCEDURE — 93005 ELECTROCARDIOGRAM TRACING: CPT

## 2025-07-21 PROCEDURE — 36415 COLL VENOUS BLD VENIPUNCTURE: CPT

## 2025-07-21 PROCEDURE — 81003 URINALYSIS AUTO W/O SCOPE: CPT

## 2025-07-21 PROCEDURE — 71046 X-RAY EXAM CHEST 2 VIEWS: CPT | Mod: 26

## 2025-07-21 PROCEDURE — 80053 COMPREHEN METABOLIC PANEL: CPT

## 2025-07-21 PROCEDURE — 81025 URINE PREGNANCY TEST: CPT

## 2025-07-21 PROCEDURE — 74177 CT ABD & PELVIS W/CONTRAST: CPT

## 2025-07-21 PROCEDURE — 83690 ASSAY OF LIPASE: CPT

## 2025-07-21 PROCEDURE — 84484 ASSAY OF TROPONIN QUANT: CPT

## 2025-07-21 PROCEDURE — 93010 ELECTROCARDIOGRAM REPORT: CPT

## 2025-07-21 PROCEDURE — 85025 COMPLETE CBC W/AUTO DIFF WBC: CPT

## 2025-07-21 PROCEDURE — 71046 X-RAY EXAM CHEST 2 VIEWS: CPT

## 2025-07-21 PROCEDURE — 74177 CT ABD & PELVIS W/CONTRAST: CPT | Mod: 26

## 2025-07-21 RX ORDER — ACETAMINOPHEN 500 MG/5ML
2 LIQUID (ML) ORAL
Qty: 42 | Refills: 0
Start: 2025-07-21 | End: 2025-07-27

## 2025-07-21 RX ORDER — FLUCONAZOLE 150 MG
150 TABLET ORAL ONCE
Refills: 0 | Status: COMPLETED | OUTPATIENT
Start: 2025-07-21 | End: 2025-07-21

## 2025-07-21 RX ADMIN — Medication 150 MILLIGRAM(S): at 15:28

## 2025-07-21 NOTE — ED PROVIDER NOTE - PATIENT PORTAL LINK FT
You can access the FollowMyHealth Patient Portal offered by [facility] by registering at the following website: [email or web address]. By joining [employer] FollowMyHealth portal, you will also be able to view your health information using other applications (apps) compatible with our system.

## 2025-07-21 NOTE — ED PROVIDER NOTE - PHYSICAL EXAMINATION
VITAL SIGNS: I have reviewed nursing notes and confirm.  CONSTITUTIONAL: well-appearing, non-toxic, NAD  SKIN: Warm dry, normal skin turgor  HEAD: NCAT  EYES: EOMI, PERRLA, no scleral icterus  ENT: Moist mucous membranes, normal pharynx with no erythema or exudates  NECK: Supple; non tender. Full ROM. No cervical LAD  CARD: RRR,  RESP: clear to ausculation b/l.   ABD: +L. CVA tenderness soft, + BS, non-tender, non-distended,   EXT: Full ROM, no bony tenderness, no pedal edema,  NEURO: normal motor. normal sensory. CN II-XII intact.   PSYCH: Cooperative, appropriate.

## 2025-07-21 NOTE — ED PROVIDER NOTE - CLINICAL SUMMARY MEDICAL DECISION MAKING FREE TEXT BOX
40-year-old female, past medical history of diabetes, sciatica, migraines, fatty liver, loop recorder for tachycardia, comes in complaining of bilateral side pain which gets worse on exertion and chest discomfort for 7 days.  No fever/chills, shortness of breath, nausea/vomiting/diarrhea, urinary symptoms.  Patient reports vaginal itching.  No vaginal discharge.  No leg pain or swelling.  Pt also reported vaginal itching. On exam, pt in NAD, AAOx3, head NC/AT, CN II-XII intact, lungs CTA B/L, CV S1S2 regular, abdomen soft/(+) periumbilical discomfort/ND/(+)BS, GYN (+) redness to labia, ext (-) edema, motor 5/5x4, sensation intact.  Work up reviewed. Pt is asymptomatic at this time. Will d/c with cardiology follow up.

## 2025-07-21 NOTE — ED PROVIDER NOTE - CONSIDERATION OF ADMISSION OBSERVATION
Consideration of Admission/Observation Considered, however, patient felt better and through shared decision making, patient preferred to follow up outpatient and trial going home with continued care.

## 2025-07-21 NOTE — ED ADULT NURSE NOTE - NSFALLUNIVINTERV_ED_ALL_ED
Bed/Stretcher in lowest position, wheels locked, appropriate side rails in place/Call bell, personal items and telephone in reach/Instruct patient to call for assistance before getting out of bed/chair/stretcher/Non-slip footwear applied when patient is off stretcher/[place] to call system/Physically safe environment - no spills, clutter or unnecessary equipment/Purposeful proactive rounding/Room/bathroom lighting operational, light cord in reach

## 2025-07-21 NOTE — ED PROVIDER NOTE - ATTENDING CONTRIBUTION TO CARE
40-year-old female, past medical history of diabetes, sciatica, migraines, fatty liver, loop recorder for tachycardia, comes in complaining of bilateral side pain which gets worse on exertion and chest discomfort for 7 days.  No fever/chills, shortness of breath, nausea/vomiting/diarrhea, urinary symptoms.  Patient reports vaginal itching.  No vaginal discharge.  No leg pain or swelling.  On exam, pt in NAD, AAOx3, head NC/AT, CN II-XII intact, lungs CTA B/L, CV S1S2 regular, abdomen soft/(+) periumbilical discomfort/ND/(+)BS, ext (-) edema, motor 5/5x4, sensation intact.  Will do labs, CT, UA, and reevaluate. 40-year-old female, past medical history of diabetes, sciatica, migraines, fatty liver, loop recorder for tachycardia, comes in complaining of bilateral side pain which gets worse on exertion and chest discomfort for 7 days.  No fever/chills, shortness of breath, nausea/vomiting/diarrhea, urinary symptoms.  Patient reports vaginal itching.  No vaginal discharge.  No leg pain or swelling.  Pt also reported vaginal itching. On exam, pt in NAD, AAOx3, head NC/AT, CN II-XII intact, lungs CTA B/L, CV S1S2 regular, abdomen soft/(+) periumbilical discomfort/ND/(+)BS, GYN (+) redness to labia, ext (-) edema, motor 5/5x4, sensation intact.  Will do labs, CT, UA, and reevaluate.

## 2025-07-21 NOTE — ED PROVIDER NOTE - OBJECTIVE STATEMENT
40-year-old female with PMHx of DM, sciatica, migraines, fatty liver, loop recorder for tachycardia presents today due to sharp, constant chest pain X 7 days.  Patient also endorses epigastric abdominal pain and left-sided back pain. patient states she took 81 mg of aspirin this morning and was given 2 aspirin by EMS PTA.  Patient denies fevers, chills, nausea, vomiting, diarrhea, falls, injury, trauma.

## 2025-07-22 ENCOUNTER — APPOINTMENT (OUTPATIENT)
Dept: OTOLARYNGOLOGY | Facility: CLINIC | Age: 40
End: 2025-07-22

## 2025-07-24 ENCOUNTER — APPOINTMENT (OUTPATIENT)
Dept: PSYCHIATRY | Facility: CLINIC | Age: 40
End: 2025-07-24

## 2025-07-26 NOTE — ED ADULT TRIAGE NOTE - BP NONINVASIVE SYSTOLIC (MM HG)
6xltp Gary replaced without complication per MD order. Will continue to monitor.   89 Stevan-9 years to 21 years...

## 2025-07-28 ENCOUNTER — NON-APPOINTMENT (OUTPATIENT)
Age: 40
End: 2025-07-28

## 2025-07-28 ENCOUNTER — APPOINTMENT (OUTPATIENT)
Dept: CARDIOLOGY | Facility: CLINIC | Age: 40
End: 2025-07-28
Payer: MEDICAID

## 2025-07-28 PROCEDURE — 93298 REM INTERROG DEV EVAL SCRMS: CPT

## 2025-07-31 ENCOUNTER — APPOINTMENT (OUTPATIENT)
Dept: PSYCHIATRY | Facility: CLINIC | Age: 40
End: 2025-07-31

## 2025-08-04 ENCOUNTER — APPOINTMENT (OUTPATIENT)
Dept: INTERNAL MEDICINE | Facility: CLINIC | Age: 40
End: 2025-08-04

## 2025-08-05 ENCOUNTER — APPOINTMENT (OUTPATIENT)
Dept: PULMONOLOGY | Facility: CLINIC | Age: 40
End: 2025-08-05
Payer: MEDICAID

## 2025-08-05 VITALS
BODY MASS INDEX: 35.87 KG/M2 | WEIGHT: 209 LBS | SYSTOLIC BLOOD PRESSURE: 132 MMHG | DIASTOLIC BLOOD PRESSURE: 84 MMHG | HEART RATE: 95 BPM | OXYGEN SATURATION: 98 % | RESPIRATION RATE: 14 BRPM

## 2025-08-05 DIAGNOSIS — J45.909 UNSPECIFIED ASTHMA, UNCOMPLICATED: ICD-10-CM

## 2025-08-05 DIAGNOSIS — G47.33 OBSTRUCTIVE SLEEP APNEA (ADULT) (PEDIATRIC): ICD-10-CM

## 2025-08-05 PROCEDURE — 99214 OFFICE O/P EST MOD 30 MIN: CPT

## 2025-08-05 PROCEDURE — G2211 COMPLEX E/M VISIT ADD ON: CPT | Mod: NC

## 2025-08-05 RX ORDER — BUDESONIDE AND FORMOTEROL FUMARATE DIHYDRATE 80; 4.5 UG/1; UG/1
80-4.5 AEROSOL RESPIRATORY (INHALATION) TWICE DAILY
Qty: 1 | Refills: 3 | Status: ACTIVE | COMMUNITY
Start: 2025-08-05 | End: 1900-01-01

## 2025-08-06 ENCOUNTER — APPOINTMENT (OUTPATIENT)
Dept: PSYCHIATRY | Facility: CLINIC | Age: 40
End: 2025-08-06

## 2025-08-07 ENCOUNTER — OUTPATIENT (OUTPATIENT)
Dept: OUTPATIENT SERVICES | Facility: HOSPITAL | Age: 40
LOS: 1 days | End: 2025-08-07
Payer: MEDICAID

## 2025-08-07 ENCOUNTER — APPOINTMENT (OUTPATIENT)
Dept: PSYCHIATRY | Facility: CLINIC | Age: 40
End: 2025-08-07
Payer: MEDICAID

## 2025-08-07 DIAGNOSIS — F41.1 GENERALIZED ANXIETY DISORDER: ICD-10-CM

## 2025-08-07 DIAGNOSIS — F32.A DEPRESSION, UNSPECIFIED: ICD-10-CM

## 2025-08-07 DIAGNOSIS — F60.3 BORDERLINE PERSONALITY DISORDER: ICD-10-CM

## 2025-08-07 DIAGNOSIS — F41.0 GENERALIZED ANXIETY DISORDER: ICD-10-CM

## 2025-08-07 PROCEDURE — 99214 OFFICE O/P EST MOD 30 MIN: CPT | Mod: 95

## 2025-08-07 PROCEDURE — 98007 SYNCH AUDIO-VIDEO EST HI 40: CPT

## 2025-08-08 DIAGNOSIS — F32.A DEPRESSION, UNSPECIFIED: ICD-10-CM

## 2025-08-08 DIAGNOSIS — F60.3 BORDERLINE PERSONALITY DISORDER: ICD-10-CM

## 2025-08-08 DIAGNOSIS — F41.1 GENERALIZED ANXIETY DISORDER: ICD-10-CM

## 2025-08-13 ENCOUNTER — APPOINTMENT (OUTPATIENT)
Dept: PSYCHIATRY | Facility: CLINIC | Age: 40
End: 2025-08-13

## 2025-08-20 ENCOUNTER — NON-APPOINTMENT (OUTPATIENT)
Age: 40
End: 2025-08-20

## 2025-08-25 ENCOUNTER — NON-APPOINTMENT (OUTPATIENT)
Age: 40
End: 2025-08-25

## 2025-08-25 ENCOUNTER — APPOINTMENT (OUTPATIENT)
Dept: GASTROENTEROLOGY | Facility: CLINIC | Age: 40
End: 2025-08-25

## 2025-08-25 ENCOUNTER — EMERGENCY (EMERGENCY)
Facility: HOSPITAL | Age: 40
LOS: 0 days | Discharge: HOME | End: 2025-08-25
Attending: EMERGENCY MEDICINE
Payer: MEDICAID

## 2025-08-25 VITALS
HEART RATE: 112 BPM | DIASTOLIC BLOOD PRESSURE: 98 MMHG | TEMPERATURE: 97 F | WEIGHT: 199.96 LBS | SYSTOLIC BLOOD PRESSURE: 166 MMHG | RESPIRATION RATE: 20 BRPM | OXYGEN SATURATION: 98 % | HEIGHT: 64 IN

## 2025-08-25 VITALS
OXYGEN SATURATION: 100 % | SYSTOLIC BLOOD PRESSURE: 133 MMHG | RESPIRATION RATE: 18 BRPM | TEMPERATURE: 98 F | DIASTOLIC BLOOD PRESSURE: 80 MMHG | HEART RATE: 74 BPM

## 2025-08-25 DIAGNOSIS — B97.89 OTHER VIRAL AGENTS AS THE CAUSE OF DISEASES CLASSIFIED ELSEWHERE: ICD-10-CM

## 2025-08-25 DIAGNOSIS — J06.9 ACUTE UPPER RESPIRATORY INFECTION, UNSPECIFIED: ICD-10-CM

## 2025-08-25 DIAGNOSIS — D64.9 ANEMIA, UNSPECIFIED: ICD-10-CM

## 2025-08-25 DIAGNOSIS — Z88.1 ALLERGY STATUS TO OTHER ANTIBIOTIC AGENTS: ICD-10-CM

## 2025-08-25 DIAGNOSIS — Z88.0 ALLERGY STATUS TO PENICILLIN: ICD-10-CM

## 2025-08-25 DIAGNOSIS — M79.10 MYALGIA, UNSPECIFIED SITE: ICD-10-CM

## 2025-08-25 DIAGNOSIS — Z88.8 ALLERGY STATUS TO OTHER DRUGS, MEDICAMENTS AND BIOLOGICAL SUBSTANCES: ICD-10-CM

## 2025-08-25 LAB
ALBUMIN SERPL ELPH-MCNC: 4.3 G/DL — SIGNIFICANT CHANGE UP (ref 3.5–5.2)
ALP SERPL-CCNC: 176 U/L — HIGH (ref 30–115)
ALT FLD-CCNC: 35 U/L — SIGNIFICANT CHANGE UP (ref 0–41)
ANION GAP SERPL CALC-SCNC: 11 MMOL/L — SIGNIFICANT CHANGE UP (ref 7–14)
APPEARANCE UR: CLEAR — SIGNIFICANT CHANGE UP
AST SERPL-CCNC: 20 U/L — SIGNIFICANT CHANGE UP (ref 0–41)
BASOPHILS # BLD AUTO: 0.04 K/UL — SIGNIFICANT CHANGE UP (ref 0–0.2)
BASOPHILS NFR BLD AUTO: 0.5 % — SIGNIFICANT CHANGE UP (ref 0–1)
BILIRUB SERPL-MCNC: <0.2 MG/DL — SIGNIFICANT CHANGE UP (ref 0.2–1.2)
BILIRUB UR-MCNC: NEGATIVE — SIGNIFICANT CHANGE UP
BUN SERPL-MCNC: 9 MG/DL — LOW (ref 10–20)
CALCIUM SERPL-MCNC: 9.2 MG/DL — SIGNIFICANT CHANGE UP (ref 8.4–10.5)
CHLORIDE SERPL-SCNC: 99 MMOL/L — SIGNIFICANT CHANGE UP (ref 98–110)
CO2 SERPL-SCNC: 24 MMOL/L — SIGNIFICANT CHANGE UP (ref 17–32)
COLOR SPEC: YELLOW — SIGNIFICANT CHANGE UP
CREAT SERPL-MCNC: 0.6 MG/DL — LOW (ref 0.7–1.5)
D DIMER BLD IA.RAPID-MCNC: <150 NG/ML DDU — SIGNIFICANT CHANGE UP
DIFF PNL FLD: NEGATIVE — SIGNIFICANT CHANGE UP
EGFR: 116 ML/MIN/1.73M2 — SIGNIFICANT CHANGE UP
EGFR: 116 ML/MIN/1.73M2 — SIGNIFICANT CHANGE UP
EOSINOPHIL # BLD AUTO: 0.22 K/UL — SIGNIFICANT CHANGE UP (ref 0–0.7)
EOSINOPHIL NFR BLD AUTO: 2.7 % — SIGNIFICANT CHANGE UP (ref 0–8)
FLUAV AG NPH QL: SIGNIFICANT CHANGE UP
FLUBV AG NPH QL: SIGNIFICANT CHANGE UP
GAS PNL BLDV: SIGNIFICANT CHANGE UP
GLUCOSE SERPL-MCNC: 323 MG/DL — HIGH (ref 70–99)
GLUCOSE UR QL: >=1000 MG/DL
HCT VFR BLD CALC: 36.3 % — LOW (ref 37–47)
HGB BLD-MCNC: 11.3 G/DL — LOW (ref 12–16)
IMM GRANULOCYTES NFR BLD AUTO: 1 % — HIGH (ref 0.1–0.3)
KETONES UR QL: NEGATIVE MG/DL — SIGNIFICANT CHANGE UP
LEUKOCYTE ESTERASE UR-ACNC: NEGATIVE — SIGNIFICANT CHANGE UP
LYMPHOCYTES # BLD AUTO: 1.4 K/UL — SIGNIFICANT CHANGE UP (ref 1.2–3.4)
LYMPHOCYTES # BLD AUTO: 17.1 % — LOW (ref 20.5–51.1)
MCHC RBC-ENTMCNC: 23.9 PG — LOW (ref 27–31)
MCHC RBC-ENTMCNC: 31.1 G/DL — LOW (ref 32–37)
MCV RBC AUTO: 76.9 FL — LOW (ref 81–99)
MONOCYTES # BLD AUTO: 0.8 K/UL — HIGH (ref 0.1–0.6)
MONOCYTES NFR BLD AUTO: 9.7 % — HIGH (ref 1.7–9.3)
NEUTROPHILS # BLD AUTO: 5.67 K/UL — SIGNIFICANT CHANGE UP (ref 1.4–6.5)
NEUTROPHILS NFR BLD AUTO: 69 % — SIGNIFICANT CHANGE UP (ref 42.2–75.2)
NITRITE UR-MCNC: NEGATIVE — SIGNIFICANT CHANGE UP
NRBC BLD AUTO-RTO: 0 /100 WBCS — SIGNIFICANT CHANGE UP (ref 0–0)
PH UR: 7 — SIGNIFICANT CHANGE UP (ref 5–8)
PLATELET # BLD AUTO: 437 K/UL — HIGH (ref 130–400)
PMV BLD: 10.2 FL — SIGNIFICANT CHANGE UP (ref 7.4–10.4)
POTASSIUM SERPL-MCNC: 4.6 MMOL/L — SIGNIFICANT CHANGE UP (ref 3.5–5)
POTASSIUM SERPL-SCNC: 4.6 MMOL/L — SIGNIFICANT CHANGE UP (ref 3.5–5)
PROT SERPL-MCNC: 7.3 G/DL — SIGNIFICANT CHANGE UP (ref 6–8)
PROT UR-MCNC: NEGATIVE MG/DL — SIGNIFICANT CHANGE UP
RBC # BLD: 4.72 M/UL — SIGNIFICANT CHANGE UP (ref 4.2–5.4)
RBC # FLD: 15.4 % — HIGH (ref 11.5–14.5)
RSV RNA NPH QL NAA+NON-PROBE: SIGNIFICANT CHANGE UP
SARS-COV-2 RNA SPEC QL NAA+PROBE: SIGNIFICANT CHANGE UP
SODIUM SERPL-SCNC: 134 MMOL/L — LOW (ref 135–146)
SOURCE RESPIRATORY: SIGNIFICANT CHANGE UP
SP GR SPEC: >1.03 — HIGH (ref 1–1.03)
UROBILINOGEN FLD QL: 0.2 MG/DL — SIGNIFICANT CHANGE UP (ref 0.2–1)
WBC # BLD: 8.21 K/UL — SIGNIFICANT CHANGE UP (ref 4.8–10.8)
WBC # FLD AUTO: 8.21 K/UL — SIGNIFICANT CHANGE UP (ref 4.8–10.8)

## 2025-08-25 PROCEDURE — 93005 ELECTROCARDIOGRAM TRACING: CPT

## 2025-08-25 PROCEDURE — 99285 EMERGENCY DEPT VISIT HI MDM: CPT | Mod: 25

## 2025-08-25 PROCEDURE — 87637 SARSCOV2&INF A&B&RSV AMP PRB: CPT

## 2025-08-25 PROCEDURE — 85379 FIBRIN DEGRADATION QUANT: CPT

## 2025-08-25 PROCEDURE — 71045 X-RAY EXAM CHEST 1 VIEW: CPT

## 2025-08-25 PROCEDURE — 85014 HEMATOCRIT: CPT

## 2025-08-25 PROCEDURE — 36415 COLL VENOUS BLD VENIPUNCTURE: CPT

## 2025-08-25 PROCEDURE — 83605 ASSAY OF LACTIC ACID: CPT

## 2025-08-25 PROCEDURE — 99285 EMERGENCY DEPT VISIT HI MDM: CPT

## 2025-08-25 PROCEDURE — 71045 X-RAY EXAM CHEST 1 VIEW: CPT | Mod: 26

## 2025-08-25 PROCEDURE — 93010 ELECTROCARDIOGRAM REPORT: CPT

## 2025-08-25 PROCEDURE — 80053 COMPREHEN METABOLIC PANEL: CPT

## 2025-08-25 PROCEDURE — 82803 BLOOD GASES ANY COMBINATION: CPT

## 2025-08-25 PROCEDURE — 82330 ASSAY OF CALCIUM: CPT

## 2025-08-25 PROCEDURE — 81003 URINALYSIS AUTO W/O SCOPE: CPT

## 2025-08-25 PROCEDURE — 85018 HEMOGLOBIN: CPT

## 2025-08-25 PROCEDURE — 84295 ASSAY OF SERUM SODIUM: CPT

## 2025-08-25 PROCEDURE — 36000 PLACE NEEDLE IN VEIN: CPT

## 2025-08-25 PROCEDURE — 85025 COMPLETE CBC W/AUTO DIFF WBC: CPT

## 2025-08-25 PROCEDURE — 84132 ASSAY OF SERUM POTASSIUM: CPT

## 2025-08-25 RX ORDER — BENZONATATE 100 MG
1 CAPSULE ORAL
Qty: 21 | Refills: 0
Start: 2025-08-25 | End: 2025-08-31

## 2025-08-25 RX ORDER — MAGNESIUM SULFATE 500 MG/ML
2 SYRINGE (ML) INJECTION ONCE
Refills: 0 | Status: DISCONTINUED | OUTPATIENT
Start: 2025-08-25 | End: 2025-08-25

## 2025-08-25 RX ORDER — ALBUTEROL SULFATE 2.5 MG/3ML
3 VIAL, NEBULIZER (ML) INHALATION
Qty: 9 | Refills: 0
Start: 2025-08-25 | End: 2025-08-31

## 2025-08-25 RX ORDER — BENZONATATE 100 MG
1 CAPSULE ORAL
Refills: 0
Start: 2025-08-25

## 2025-08-25 RX ORDER — IPRATROPIUM BROMIDE AND ALBUTEROL SULFATE .5; 2.5 MG/3ML; MG/3ML
3 SOLUTION RESPIRATORY (INHALATION)
Refills: 0 | Status: DISCONTINUED | OUTPATIENT
Start: 2025-08-25 | End: 2025-08-25

## 2025-08-25 RX ADMIN — Medication 2000 MILLILITER(S): at 12:18

## 2025-08-27 ENCOUNTER — EMERGENCY (EMERGENCY)
Facility: HOSPITAL | Age: 40
LOS: 0 days | Discharge: HOME | End: 2025-08-27
Attending: EMERGENCY MEDICINE
Payer: MEDICAID

## 2025-08-27 VITALS
HEIGHT: 64 IN | RESPIRATION RATE: 18 BRPM | SYSTOLIC BLOOD PRESSURE: 125 MMHG | TEMPERATURE: 98 F | DIASTOLIC BLOOD PRESSURE: 81 MMHG | HEART RATE: 110 BPM | OXYGEN SATURATION: 100 % | WEIGHT: 207.9 LBS

## 2025-08-27 VITALS
DIASTOLIC BLOOD PRESSURE: 55 MMHG | OXYGEN SATURATION: 97 % | RESPIRATION RATE: 17 BRPM | HEART RATE: 110 BPM | SYSTOLIC BLOOD PRESSURE: 128 MMHG | TEMPERATURE: 99 F

## 2025-08-27 DIAGNOSIS — R06.02 SHORTNESS OF BREATH: ICD-10-CM

## 2025-08-27 DIAGNOSIS — Z88.0 ALLERGY STATUS TO PENICILLIN: ICD-10-CM

## 2025-08-27 DIAGNOSIS — Z88.7 ALLERGY STATUS TO SERUM AND VACCINE: ICD-10-CM

## 2025-08-27 DIAGNOSIS — Z88.1 ALLERGY STATUS TO OTHER ANTIBIOTIC AGENTS: ICD-10-CM

## 2025-08-27 DIAGNOSIS — Z88.8 ALLERGY STATUS TO OTHER DRUGS, MEDICAMENTS AND BIOLOGICAL SUBSTANCES: ICD-10-CM

## 2025-08-27 DIAGNOSIS — R05.9 COUGH, UNSPECIFIED: ICD-10-CM

## 2025-08-27 DIAGNOSIS — J40 BRONCHITIS, NOT SPECIFIED AS ACUTE OR CHRONIC: ICD-10-CM

## 2025-08-27 LAB
ALBUMIN SERPL ELPH-MCNC: 4.2 G/DL — SIGNIFICANT CHANGE UP (ref 3.5–5.2)
ALP SERPL-CCNC: 154 U/L — HIGH (ref 30–115)
ALT FLD-CCNC: 32 U/L — SIGNIFICANT CHANGE UP (ref 0–41)
ANION GAP SERPL CALC-SCNC: 13 MMOL/L — SIGNIFICANT CHANGE UP (ref 7–14)
AST SERPL-CCNC: 19 U/L — SIGNIFICANT CHANGE UP (ref 0–41)
BASE EXCESS BLDV CALC-SCNC: -0.2 MMOL/L — SIGNIFICANT CHANGE UP (ref -2–3)
BASOPHILS # BLD AUTO: 0.05 K/UL — SIGNIFICANT CHANGE UP (ref 0–0.2)
BASOPHILS NFR BLD AUTO: 0.5 % — SIGNIFICANT CHANGE UP (ref 0–1)
BILIRUB SERPL-MCNC: <0.2 MG/DL — SIGNIFICANT CHANGE UP (ref 0.2–1.2)
BUN SERPL-MCNC: 11 MG/DL — SIGNIFICANT CHANGE UP (ref 10–20)
CA-I SERPL-SCNC: 1.23 MMOL/L — SIGNIFICANT CHANGE UP (ref 1.15–1.33)
CALCIUM SERPL-MCNC: 9.2 MG/DL — SIGNIFICANT CHANGE UP (ref 8.4–10.5)
CHLORIDE SERPL-SCNC: 100 MMOL/L — SIGNIFICANT CHANGE UP (ref 98–110)
CO2 SERPL-SCNC: 22 MMOL/L — SIGNIFICANT CHANGE UP (ref 17–32)
CREAT SERPL-MCNC: 0.6 MG/DL — LOW (ref 0.7–1.5)
EGFR: 116 ML/MIN/1.73M2 — SIGNIFICANT CHANGE UP
EGFR: 116 ML/MIN/1.73M2 — SIGNIFICANT CHANGE UP
EOSINOPHIL # BLD AUTO: 0.22 K/UL — SIGNIFICANT CHANGE UP (ref 0–0.7)
EOSINOPHIL NFR BLD AUTO: 2.4 % — SIGNIFICANT CHANGE UP (ref 0–8)
GAS PNL BLDV: 132 MMOL/L — LOW (ref 136–145)
GAS PNL BLDV: SIGNIFICANT CHANGE UP
GAS PNL BLDV: SIGNIFICANT CHANGE UP
GLUCOSE SERPL-MCNC: 348 MG/DL — HIGH (ref 70–99)
HCG SERPL QL: NEGATIVE — SIGNIFICANT CHANGE UP
HCO3 BLDV-SCNC: 25 MMOL/L — SIGNIFICANT CHANGE UP (ref 22–29)
HCT VFR BLD CALC: 35.6 % — LOW (ref 37–47)
HGB BLD-MCNC: 10.8 G/DL — LOW (ref 12–16)
IMM GRANULOCYTES NFR BLD AUTO: 0.4 % — HIGH (ref 0.1–0.3)
LACTATE BLDV-MCNC: 2 MMOL/L — SIGNIFICANT CHANGE UP (ref 0.5–2)
LYMPHOCYTES # BLD AUTO: 1.44 K/UL — SIGNIFICANT CHANGE UP (ref 1.2–3.4)
LYMPHOCYTES # BLD AUTO: 15.5 % — LOW (ref 20.5–51.1)
MAGNESIUM SERPL-MCNC: 1.6 MG/DL — LOW (ref 1.8–2.4)
MCHC RBC-ENTMCNC: 23.6 PG — LOW (ref 27–31)
MCHC RBC-ENTMCNC: 30.3 G/DL — LOW (ref 32–37)
MCV RBC AUTO: 77.9 FL — LOW (ref 81–99)
MONOCYTES # BLD AUTO: 0.82 K/UL — HIGH (ref 0.1–0.6)
MONOCYTES NFR BLD AUTO: 8.8 % — SIGNIFICANT CHANGE UP (ref 1.7–9.3)
NEUTROPHILS # BLD AUTO: 6.7 K/UL — HIGH (ref 1.4–6.5)
NEUTROPHILS NFR BLD AUTO: 72.4 % — SIGNIFICANT CHANGE UP (ref 42.2–75.2)
NRBC BLD AUTO-RTO: 0 /100 WBCS — SIGNIFICANT CHANGE UP (ref 0–0)
PCO2 BLDV: 41 MMHG — SIGNIFICANT CHANGE UP (ref 39–42)
PH BLDV: 7.39 — SIGNIFICANT CHANGE UP (ref 7.32–7.43)
PLATELET # BLD AUTO: 424 K/UL — HIGH (ref 130–400)
PMV BLD: 10 FL — SIGNIFICANT CHANGE UP (ref 7.4–10.4)
PO2 BLDV: 51 MMHG — HIGH (ref 25–45)
POTASSIUM BLDV-SCNC: 3.9 MMOL/L — SIGNIFICANT CHANGE UP (ref 3.5–5.1)
POTASSIUM SERPL-MCNC: 4.3 MMOL/L — SIGNIFICANT CHANGE UP (ref 3.5–5)
POTASSIUM SERPL-SCNC: 4.3 MMOL/L — SIGNIFICANT CHANGE UP (ref 3.5–5)
PROT SERPL-MCNC: 7.2 G/DL — SIGNIFICANT CHANGE UP (ref 6–8)
RBC # BLD: 4.57 M/UL — SIGNIFICANT CHANGE UP (ref 4.2–5.4)
RBC # FLD: 15.5 % — HIGH (ref 11.5–14.5)
SODIUM SERPL-SCNC: 135 MMOL/L — SIGNIFICANT CHANGE UP (ref 135–146)
WBC # BLD: 9.27 K/UL — SIGNIFICANT CHANGE UP (ref 4.8–10.8)
WBC # FLD AUTO: 9.27 K/UL — SIGNIFICANT CHANGE UP (ref 4.8–10.8)

## 2025-08-27 PROCEDURE — 93010 ELECTROCARDIOGRAM REPORT: CPT

## 2025-08-27 PROCEDURE — 80053 COMPREHEN METABOLIC PANEL: CPT

## 2025-08-27 PROCEDURE — 84132 ASSAY OF SERUM POTASSIUM: CPT

## 2025-08-27 PROCEDURE — 84295 ASSAY OF SERUM SODIUM: CPT

## 2025-08-27 PROCEDURE — 94640 AIRWAY INHALATION TREATMENT: CPT

## 2025-08-27 PROCEDURE — 71046 X-RAY EXAM CHEST 2 VIEWS: CPT | Mod: 26

## 2025-08-27 PROCEDURE — 83735 ASSAY OF MAGNESIUM: CPT

## 2025-08-27 PROCEDURE — 96375 TX/PRO/DX INJ NEW DRUG ADDON: CPT

## 2025-08-27 PROCEDURE — 93005 ELECTROCARDIOGRAM TRACING: CPT

## 2025-08-27 PROCEDURE — 82330 ASSAY OF CALCIUM: CPT

## 2025-08-27 PROCEDURE — 84703 CHORIONIC GONADOTROPIN ASSAY: CPT

## 2025-08-27 PROCEDURE — 71046 X-RAY EXAM CHEST 2 VIEWS: CPT

## 2025-08-27 PROCEDURE — 99285 EMERGENCY DEPT VISIT HI MDM: CPT

## 2025-08-27 PROCEDURE — 36415 COLL VENOUS BLD VENIPUNCTURE: CPT

## 2025-08-27 PROCEDURE — 96374 THER/PROPH/DIAG INJ IV PUSH: CPT

## 2025-08-27 PROCEDURE — 83605 ASSAY OF LACTIC ACID: CPT

## 2025-08-27 PROCEDURE — 85025 COMPLETE CBC W/AUTO DIFF WBC: CPT

## 2025-08-27 PROCEDURE — 99285 EMERGENCY DEPT VISIT HI MDM: CPT | Mod: 25

## 2025-08-27 RX ORDER — DOXYCYCLINE HYCLATE 100 MG
1 TABLET ORAL
Qty: 10 | Refills: 0
Start: 2025-08-27 | End: 2025-08-31

## 2025-08-27 RX ORDER — ONDANSETRON HCL/PF 4 MG/2 ML
4 VIAL (ML) INJECTION ONCE
Refills: 0 | Status: COMPLETED | OUTPATIENT
Start: 2025-08-27 | End: 2025-08-27

## 2025-08-27 RX ORDER — METHYLPREDNISOLONE ACETATE 80 MG/ML
125 INJECTION, SUSPENSION INTRA-ARTICULAR; INTRALESIONAL; INTRAMUSCULAR; SOFT TISSUE ONCE
Refills: 0 | Status: COMPLETED | OUTPATIENT
Start: 2025-08-27 | End: 2025-08-27

## 2025-08-27 RX ORDER — ALBUTEROL SULFATE 2.5 MG/3ML
1 VIAL, NEBULIZER (ML) INHALATION ONCE
Refills: 0 | Status: COMPLETED | OUTPATIENT
Start: 2025-08-27 | End: 2025-08-27

## 2025-08-27 RX ORDER — IPRATROPIUM BROMIDE AND ALBUTEROL SULFATE .5; 2.5 MG/3ML; MG/3ML
3 SOLUTION RESPIRATORY (INHALATION)
Refills: 0 | Status: DISCONTINUED | OUTPATIENT
Start: 2025-08-27 | End: 2025-08-27

## 2025-08-27 RX ORDER — MAGNESIUM SULFATE 500 MG/ML
2 SYRINGE (ML) INJECTION ONCE
Refills: 0 | Status: COMPLETED | OUTPATIENT
Start: 2025-08-27 | End: 2025-08-27

## 2025-08-27 RX ORDER — DOXYCYCLINE HYCLATE 100 MG
100 TABLET ORAL ONCE
Refills: 0 | Status: COMPLETED | OUTPATIENT
Start: 2025-08-27 | End: 2025-08-27

## 2025-08-27 RX ORDER — KETOROLAC TROMETHAMINE 30 MG/ML
30 INJECTION, SOLUTION INTRAMUSCULAR; INTRAVENOUS ONCE
Refills: 0 | Status: DISCONTINUED | OUTPATIENT
Start: 2025-08-27 | End: 2025-08-27

## 2025-08-27 RX ORDER — PREDNISONE 20 MG/1
2 TABLET ORAL
Qty: 8 | Refills: 0
Start: 2025-08-27 | End: 2025-08-30

## 2025-08-27 RX ADMIN — Medication 1000 MILLILITER(S): at 11:55

## 2025-08-27 RX ADMIN — KETOROLAC TROMETHAMINE 30 MILLIGRAM(S): 30 INJECTION, SOLUTION INTRAMUSCULAR; INTRAVENOUS at 15:47

## 2025-08-27 RX ADMIN — Medication 100 MILLIGRAM(S): at 15:47

## 2025-08-27 RX ADMIN — IPRATROPIUM BROMIDE AND ALBUTEROL SULFATE 3 MILLILITER(S): .5; 2.5 SOLUTION RESPIRATORY (INHALATION) at 11:55

## 2025-08-27 RX ADMIN — Medication 1 PUFF(S): at 15:47

## 2025-08-27 RX ADMIN — Medication 25 GRAM(S): at 12:31

## 2025-08-27 RX ADMIN — Medication 4 MILLIGRAM(S): at 15:47

## 2025-08-27 RX ADMIN — METHYLPREDNISOLONE ACETATE 125 MILLIGRAM(S): 80 INJECTION, SUSPENSION INTRA-ARTICULAR; INTRALESIONAL; INTRAMUSCULAR; SOFT TISSUE at 11:55

## 2025-08-29 ENCOUNTER — NON-APPOINTMENT (OUTPATIENT)
Age: 40
End: 2025-08-29

## 2025-08-29 ENCOUNTER — APPOINTMENT (OUTPATIENT)
Dept: CARDIOLOGY | Facility: CLINIC | Age: 40
End: 2025-08-29

## 2025-08-29 PROCEDURE — 93298 REM INTERROG DEV EVAL SCRMS: CPT

## 2025-09-10 ENCOUNTER — APPOINTMENT (OUTPATIENT)
Dept: PSYCHIATRY | Facility: CLINIC | Age: 40
End: 2025-09-10

## 2025-09-10 ENCOUNTER — EMERGENCY (EMERGENCY)
Facility: HOSPITAL | Age: 40
LOS: 0 days | Discharge: HOME | End: 2025-09-10
Attending: STUDENT IN AN ORGANIZED HEALTH CARE EDUCATION/TRAINING PROGRAM
Payer: MEDICAID

## 2025-09-10 VITALS
OXYGEN SATURATION: 99 % | HEIGHT: 64 IN | TEMPERATURE: 98 F | WEIGHT: 209 LBS | HEART RATE: 98 BPM | SYSTOLIC BLOOD PRESSURE: 117 MMHG | RESPIRATION RATE: 20 BRPM | DIASTOLIC BLOOD PRESSURE: 73 MMHG

## 2025-09-10 PROCEDURE — 71046 X-RAY EXAM CHEST 2 VIEWS: CPT

## 2025-09-10 PROCEDURE — 94640 AIRWAY INHALATION TREATMENT: CPT

## 2025-09-10 PROCEDURE — 99285 EMERGENCY DEPT VISIT HI MDM: CPT | Mod: 25

## 2025-09-10 PROCEDURE — 71046 X-RAY EXAM CHEST 2 VIEWS: CPT | Mod: 26

## 2025-09-10 PROCEDURE — 99284 EMERGENCY DEPT VISIT MOD MDM: CPT

## 2025-09-10 RX ORDER — IPRATROPIUM BROMIDE AND ALBUTEROL SULFATE .5; 2.5 MG/3ML; MG/3ML
3 SOLUTION RESPIRATORY (INHALATION)
Refills: 0 | Status: COMPLETED | OUTPATIENT
Start: 2025-09-10 | End: 2025-09-10

## 2025-09-10 RX ADMIN — IPRATROPIUM BROMIDE AND ALBUTEROL SULFATE 3 MILLILITER(S): .5; 2.5 SOLUTION RESPIRATORY (INHALATION) at 11:32

## 2025-09-10 RX ADMIN — IPRATROPIUM BROMIDE AND ALBUTEROL SULFATE 3 MILLILITER(S): .5; 2.5 SOLUTION RESPIRATORY (INHALATION) at 11:07

## 2025-09-10 RX ADMIN — IPRATROPIUM BROMIDE AND ALBUTEROL SULFATE 3 MILLILITER(S): .5; 2.5 SOLUTION RESPIRATORY (INHALATION) at 10:50

## 2025-09-11 DIAGNOSIS — Z79.4 LONG TERM (CURRENT) USE OF INSULIN: ICD-10-CM

## 2025-09-11 DIAGNOSIS — Z88.8 ALLERGY STATUS TO OTHER DRUGS, MEDICAMENTS AND BIOLOGICAL SUBSTANCES: ICD-10-CM

## 2025-09-11 DIAGNOSIS — Z88.1 ALLERGY STATUS TO OTHER ANTIBIOTIC AGENTS: ICD-10-CM

## 2025-09-11 DIAGNOSIS — R05.1 ACUTE COUGH: ICD-10-CM

## 2025-09-11 DIAGNOSIS — Z88.0 ALLERGY STATUS TO PENICILLIN: ICD-10-CM

## 2025-09-11 DIAGNOSIS — E11.9 TYPE 2 DIABETES MELLITUS WITHOUT COMPLICATIONS: ICD-10-CM

## 2025-09-11 DIAGNOSIS — J45.901 UNSPECIFIED ASTHMA WITH (ACUTE) EXACERBATION: ICD-10-CM

## 2025-09-16 ENCOUNTER — EMERGENCY (EMERGENCY)
Facility: HOSPITAL | Age: 40
LOS: 0 days | Discharge: HOME | End: 2025-09-16
Attending: EMERGENCY MEDICINE
Payer: MEDICAID

## 2025-09-16 VITALS
DIASTOLIC BLOOD PRESSURE: 76 MMHG | RESPIRATION RATE: 18 BRPM | OXYGEN SATURATION: 99 % | TEMPERATURE: 98 F | WEIGHT: 209 LBS | HEART RATE: 86 BPM | SYSTOLIC BLOOD PRESSURE: 120 MMHG | HEIGHT: 64 IN

## 2025-09-16 VITALS
SYSTOLIC BLOOD PRESSURE: 118 MMHG | OXYGEN SATURATION: 100 % | DIASTOLIC BLOOD PRESSURE: 72 MMHG | HEART RATE: 92 BPM | RESPIRATION RATE: 18 BRPM | TEMPERATURE: 98 F

## 2025-09-16 LAB
ALBUMIN SERPL ELPH-MCNC: 4.5 G/DL — SIGNIFICANT CHANGE UP (ref 3.5–5.2)
ALP SERPL-CCNC: 164 U/L — HIGH (ref 30–115)
ALT FLD-CCNC: 37 U/L — SIGNIFICANT CHANGE UP (ref 0–41)
ANION GAP SERPL CALC-SCNC: 14 MMOL/L — SIGNIFICANT CHANGE UP (ref 7–14)
AST SERPL-CCNC: 24 U/L — SIGNIFICANT CHANGE UP (ref 0–41)
BASE EXCESS BLDV CALC-SCNC: -1.6 MMOL/L — SIGNIFICANT CHANGE UP (ref -2–3)
BASOPHILS # BLD AUTO: 0.06 K/UL — SIGNIFICANT CHANGE UP (ref 0–0.2)
BASOPHILS NFR BLD AUTO: 0.6 % — SIGNIFICANT CHANGE UP (ref 0–1)
BILIRUB SERPL-MCNC: <0.2 MG/DL — SIGNIFICANT CHANGE UP (ref 0.2–1.2)
BUN SERPL-MCNC: 12 MG/DL — SIGNIFICANT CHANGE UP (ref 10–20)
CA-I SERPL-SCNC: 1.16 MMOL/L — SIGNIFICANT CHANGE UP (ref 1.15–1.33)
CALCIUM SERPL-MCNC: 9 MG/DL — SIGNIFICANT CHANGE UP (ref 8.4–10.5)
CHLORIDE SERPL-SCNC: 102 MMOL/L — SIGNIFICANT CHANGE UP (ref 98–110)
CO2 SERPL-SCNC: 20 MMOL/L — SIGNIFICANT CHANGE UP (ref 17–32)
CREAT SERPL-MCNC: 0.7 MG/DL — SIGNIFICANT CHANGE UP (ref 0.7–1.5)
EGFR: 112 ML/MIN/1.73M2 — SIGNIFICANT CHANGE UP
EGFR: 112 ML/MIN/1.73M2 — SIGNIFICANT CHANGE UP
EOSINOPHIL # BLD AUTO: 0.32 K/UL — SIGNIFICANT CHANGE UP (ref 0–0.7)
EOSINOPHIL NFR BLD AUTO: 3.1 % — SIGNIFICANT CHANGE UP (ref 0–8)
GAS PNL BLDV: 130 MMOL/L — LOW (ref 136–145)
GAS PNL BLDV: SIGNIFICANT CHANGE UP
GAS PNL BLDV: SIGNIFICANT CHANGE UP
GLUCOSE SERPL-MCNC: 278 MG/DL — HIGH (ref 70–99)
HCG SERPL QL: NEGATIVE — SIGNIFICANT CHANGE UP
HCO3 BLDV-SCNC: 23 MMOL/L — SIGNIFICANT CHANGE UP (ref 22–29)
HCT VFR BLD CALC: 34.5 % — LOW (ref 37–47)
HCT VFR BLDA CALC: 34 % — LOW (ref 34.5–46.5)
HGB BLD CALC-MCNC: 11.3 G/DL — LOW (ref 11.7–16.1)
HGB BLD-MCNC: 10.5 G/DL — LOW (ref 12–16)
IMM GRANULOCYTES NFR BLD AUTO: 0.4 % — HIGH (ref 0.1–0.3)
LACTATE BLDV-MCNC: 2.8 MMOL/L — HIGH (ref 0.5–2)
LYMPHOCYTES # BLD AUTO: 2.62 K/UL — SIGNIFICANT CHANGE UP (ref 1.2–3.4)
LYMPHOCYTES # BLD AUTO: 25.6 % — SIGNIFICANT CHANGE UP (ref 20.5–51.1)
MCHC RBC-ENTMCNC: 23 PG — LOW (ref 27–31)
MCHC RBC-ENTMCNC: 30.4 G/DL — LOW (ref 32–37)
MCV RBC AUTO: 75.5 FL — LOW (ref 81–99)
MONOCYTES # BLD AUTO: 0.85 K/UL — HIGH (ref 0.1–0.6)
MONOCYTES NFR BLD AUTO: 8.3 % — SIGNIFICANT CHANGE UP (ref 1.7–9.3)
NEUTROPHILS # BLD AUTO: 6.34 K/UL — SIGNIFICANT CHANGE UP (ref 1.4–6.5)
NEUTROPHILS NFR BLD AUTO: 62 % — SIGNIFICANT CHANGE UP (ref 42.2–75.2)
NRBC BLD AUTO-RTO: 0 /100 WBCS — SIGNIFICANT CHANGE UP (ref 0–0)
PCO2 BLDV: 37 MMHG — LOW (ref 39–42)
PH BLDV: 7.4 — SIGNIFICANT CHANGE UP (ref 7.32–7.43)
PLATELET # BLD AUTO: 482 K/UL — HIGH (ref 130–400)
PMV BLD: 10 FL — SIGNIFICANT CHANGE UP (ref 7.4–10.4)
PO2 BLDV: 68 MMHG — HIGH (ref 25–45)
POTASSIUM BLDV-SCNC: 4.6 MMOL/L — SIGNIFICANT CHANGE UP (ref 3.5–5.1)
POTASSIUM SERPL-MCNC: 4.7 MMOL/L — SIGNIFICANT CHANGE UP (ref 3.5–5)
POTASSIUM SERPL-SCNC: 4.7 MMOL/L — SIGNIFICANT CHANGE UP (ref 3.5–5)
PROT SERPL-MCNC: 7.2 G/DL — SIGNIFICANT CHANGE UP (ref 6–8)
RBC # BLD: 4.57 M/UL — SIGNIFICANT CHANGE UP (ref 4.2–5.4)
RBC # FLD: 16.2 % — HIGH (ref 11.5–14.5)
SAO2 % BLDV: 94.4 % — HIGH (ref 67–88)
SODIUM SERPL-SCNC: 136 MMOL/L — SIGNIFICANT CHANGE UP (ref 135–146)
WBC # BLD: 10.23 K/UL — SIGNIFICANT CHANGE UP (ref 4.8–10.8)
WBC # FLD AUTO: 10.23 K/UL — SIGNIFICANT CHANGE UP (ref 4.8–10.8)

## 2025-09-16 PROCEDURE — 84132 ASSAY OF SERUM POTASSIUM: CPT

## 2025-09-16 PROCEDURE — 71046 X-RAY EXAM CHEST 2 VIEWS: CPT

## 2025-09-16 PROCEDURE — 71275 CT ANGIOGRAPHY CHEST: CPT

## 2025-09-16 PROCEDURE — 85014 HEMATOCRIT: CPT

## 2025-09-16 PROCEDURE — 82330 ASSAY OF CALCIUM: CPT

## 2025-09-16 PROCEDURE — 85025 COMPLETE CBC W/AUTO DIFF WBC: CPT

## 2025-09-16 PROCEDURE — 36415 COLL VENOUS BLD VENIPUNCTURE: CPT

## 2025-09-16 PROCEDURE — 71275 CT ANGIOGRAPHY CHEST: CPT | Mod: 26

## 2025-09-16 PROCEDURE — 85018 HEMOGLOBIN: CPT

## 2025-09-16 PROCEDURE — 93010 ELECTROCARDIOGRAM REPORT: CPT

## 2025-09-16 PROCEDURE — 84703 CHORIONIC GONADOTROPIN ASSAY: CPT

## 2025-09-16 PROCEDURE — 71046 X-RAY EXAM CHEST 2 VIEWS: CPT | Mod: 26

## 2025-09-16 PROCEDURE — 80053 COMPREHEN METABOLIC PANEL: CPT

## 2025-09-16 PROCEDURE — 93005 ELECTROCARDIOGRAM TRACING: CPT

## 2025-09-16 PROCEDURE — 83605 ASSAY OF LACTIC ACID: CPT

## 2025-09-16 PROCEDURE — 84295 ASSAY OF SERUM SODIUM: CPT

## 2025-09-16 PROCEDURE — 94640 AIRWAY INHALATION TREATMENT: CPT

## 2025-09-16 PROCEDURE — 82803 BLOOD GASES ANY COMBINATION: CPT

## 2025-09-16 RX ORDER — DEXAMETHASONE 0.5 MG/1
10 TABLET ORAL ONCE
Refills: 0 | Status: COMPLETED | OUTPATIENT
Start: 2025-09-16 | End: 2025-09-16

## 2025-09-16 RX ORDER — DIPHENHYDRAMINE HCL 12.5MG/5ML
50 ELIXIR ORAL ONCE
Refills: 0 | Status: COMPLETED | OUTPATIENT
Start: 2025-09-16 | End: 2025-09-16

## 2025-09-16 RX ORDER — IPRATROPIUM BROMIDE AND ALBUTEROL SULFATE .5; 2.5 MG/3ML; MG/3ML
3 SOLUTION RESPIRATORY (INHALATION) ONCE
Refills: 0 | Status: COMPLETED | OUTPATIENT
Start: 2025-09-16 | End: 2025-09-16

## 2025-09-16 RX ORDER — MAGNESIUM SULFATE 500 MG/ML
2 SYRINGE (ML) INJECTION ONCE
Refills: 0 | Status: COMPLETED | OUTPATIENT
Start: 2025-09-16 | End: 2025-09-16

## 2025-09-16 RX ORDER — SODIUM CHLORIDE 9 G/1000ML
1000 INJECTION, SOLUTION INTRAVENOUS ONCE
Refills: 0 | Status: COMPLETED | OUTPATIENT
Start: 2025-09-16 | End: 2025-09-16

## 2025-09-16 RX ORDER — IPRATROPIUM BROMIDE AND ALBUTEROL SULFATE .5; 2.5 MG/3ML; MG/3ML
3 SOLUTION RESPIRATORY (INHALATION)
Refills: 0 | Status: DISCONTINUED | OUTPATIENT
Start: 2025-09-16 | End: 2025-09-16

## 2025-09-16 RX ADMIN — Medication 50 MILLIGRAM(S): at 15:02

## 2025-09-16 RX ADMIN — DEXAMETHASONE 102 MILLIGRAM(S): 0.5 TABLET ORAL at 15:01

## 2025-09-16 RX ADMIN — Medication 20 MILLIGRAM(S): at 15:02

## 2025-09-16 RX ADMIN — SODIUM CHLORIDE 1000 MILLILITER(S): 9 INJECTION, SOLUTION INTRAVENOUS at 13:41

## 2025-09-16 RX ADMIN — Medication 150 GRAM(S): at 11:49

## 2025-09-16 RX ADMIN — IPRATROPIUM BROMIDE AND ALBUTEROL SULFATE 3 MILLILITER(S): .5; 2.5 SOLUTION RESPIRATORY (INHALATION) at 11:49

## 2025-09-17 ENCOUNTER — LABORATORY RESULT (OUTPATIENT)
Age: 40
End: 2025-09-17

## 2025-09-17 ENCOUNTER — APPOINTMENT (OUTPATIENT)
Dept: OBGYN | Facility: CLINIC | Age: 40
End: 2025-09-17

## 2025-09-18 ENCOUNTER — NON-APPOINTMENT (OUTPATIENT)
Age: 40
End: 2025-09-18

## 2025-09-19 ENCOUNTER — APPOINTMENT (OUTPATIENT)
Dept: OTOLARYNGOLOGY | Facility: CLINIC | Age: 40
End: 2025-09-19

## 2025-09-19 ENCOUNTER — EMERGENCY (EMERGENCY)
Facility: HOSPITAL | Age: 40
LOS: 0 days | Discharge: HOME | End: 2025-09-19
Attending: EMERGENCY MEDICINE
Payer: MEDICAID

## 2025-09-19 VITALS
DIASTOLIC BLOOD PRESSURE: 88 MMHG | OXYGEN SATURATION: 98 % | RESPIRATION RATE: 20 BRPM | HEIGHT: 64 IN | SYSTOLIC BLOOD PRESSURE: 150 MMHG | TEMPERATURE: 98 F | HEART RATE: 88 BPM | WEIGHT: 209 LBS

## 2025-09-19 VITALS
SYSTOLIC BLOOD PRESSURE: 144 MMHG | OXYGEN SATURATION: 98 % | RESPIRATION RATE: 18 BRPM | DIASTOLIC BLOOD PRESSURE: 66 MMHG | HEART RATE: 76 BPM

## 2025-09-19 DIAGNOSIS — Z88.1 ALLERGY STATUS TO OTHER ANTIBIOTIC AGENTS: ICD-10-CM

## 2025-09-19 DIAGNOSIS — J45.901 UNSPECIFIED ASTHMA WITH (ACUTE) EXACERBATION: ICD-10-CM

## 2025-09-19 DIAGNOSIS — Z88.7 ALLERGY STATUS TO SERUM AND VACCINE: ICD-10-CM

## 2025-09-19 DIAGNOSIS — Z88.0 ALLERGY STATUS TO PENICILLIN: ICD-10-CM

## 2025-09-19 DIAGNOSIS — J45.909 UNSPECIFIED ASTHMA, UNCOMPLICATED: ICD-10-CM

## 2025-09-19 PROCEDURE — 99285 EMERGENCY DEPT VISIT HI MDM: CPT

## 2025-09-19 PROCEDURE — 93005 ELECTROCARDIOGRAM TRACING: CPT

## 2025-09-19 PROCEDURE — 71046 X-RAY EXAM CHEST 2 VIEWS: CPT | Mod: 26

## 2025-09-19 PROCEDURE — 71046 X-RAY EXAM CHEST 2 VIEWS: CPT

## 2025-09-19 PROCEDURE — 93010 ELECTROCARDIOGRAM REPORT: CPT

## 2025-09-19 PROCEDURE — 94640 AIRWAY INHALATION TREATMENT: CPT

## 2025-09-19 PROCEDURE — 99283 EMERGENCY DEPT VISIT LOW MDM: CPT | Mod: 25

## 2025-09-19 RX ORDER — HYDROXYZINE HYDROCHLORIDE 25 MG/1
25 TABLET, FILM COATED ORAL ONCE
Refills: 0 | Status: COMPLETED | OUTPATIENT
Start: 2025-09-19 | End: 2025-09-19

## 2025-09-19 RX ORDER — HYDROXYZINE HYDROCHLORIDE 25 MG/1
1 TABLET, FILM COATED ORAL
Qty: 14 | Refills: 0
Start: 2025-09-19 | End: 2025-09-25

## 2025-09-19 RX ORDER — PREDNISONE 20 MG/1
2 TABLET ORAL
Qty: 10 | Refills: 0
Start: 2025-09-19 | End: 2025-09-23

## 2025-09-19 RX ORDER — IPRATROPIUM BROMIDE AND ALBUTEROL SULFATE .5; 2.5 MG/3ML; MG/3ML
3 SOLUTION RESPIRATORY (INHALATION) ONCE
Refills: 0 | Status: COMPLETED | OUTPATIENT
Start: 2025-09-19 | End: 2025-09-19

## 2025-09-19 RX ADMIN — HYDROXYZINE HYDROCHLORIDE 25 MILLIGRAM(S): 25 TABLET, FILM COATED ORAL at 10:09

## 2025-09-19 RX ADMIN — IPRATROPIUM BROMIDE AND ALBUTEROL SULFATE 3 MILLILITER(S): .5; 2.5 SOLUTION RESPIRATORY (INHALATION) at 10:10
